# Patient Record
Sex: FEMALE | Race: WHITE | NOT HISPANIC OR LATINO | Employment: UNEMPLOYED | URBAN - METROPOLITAN AREA
[De-identification: names, ages, dates, MRNs, and addresses within clinical notes are randomized per-mention and may not be internally consistent; named-entity substitution may affect disease eponyms.]

---

## 2017-01-05 ENCOUNTER — MYC MEDICAL ADVICE (OUTPATIENT)
Dept: FAMILY MEDICINE | Facility: CLINIC | Age: 57
End: 2017-01-05

## 2017-01-05 DIAGNOSIS — R07.89 CHEST WALL PAIN: ICD-10-CM

## 2017-01-05 DIAGNOSIS — E89.0 S/P THYROIDECTOMY: ICD-10-CM

## 2017-01-05 DIAGNOSIS — Z85.3 PERSONAL HISTORY OF MALIGNANT NEOPLASM OF BREAST: ICD-10-CM

## 2017-01-05 DIAGNOSIS — E89.2 POSTSURGICAL HYPOPARATHYROIDISM (H): ICD-10-CM

## 2017-01-05 DIAGNOSIS — Z98.84 STATUS POST BARIATRIC SURGERY: ICD-10-CM

## 2017-01-05 DIAGNOSIS — Z80.49 FAMILY HISTORY OF UTERINE CANCER: ICD-10-CM

## 2017-01-05 DIAGNOSIS — D89.42 IDIOPATHIC MAST CELL ACTIVATION SYNDROME (H): Primary | Chronic | ICD-10-CM

## 2017-01-06 ENCOUNTER — OFFICE VISIT (OUTPATIENT)
Dept: PALLIATIVE CARE | Facility: CLINIC | Age: 57
End: 2017-01-06
Attending: INTERNAL MEDICINE
Payer: COMMERCIAL

## 2017-01-06 VITALS
HEART RATE: 122 BPM | OXYGEN SATURATION: 100 % | DIASTOLIC BLOOD PRESSURE: 85 MMHG | SYSTOLIC BLOOD PRESSURE: 166 MMHG | RESPIRATION RATE: 18 BRPM | TEMPERATURE: 97 F

## 2017-01-06 DIAGNOSIS — M62.838 MUSCLE SPASM: ICD-10-CM

## 2017-01-06 DIAGNOSIS — R07.89 CHEST WALL PAIN: Primary | ICD-10-CM

## 2017-01-06 DIAGNOSIS — G43.909 MIGRAINE WITHOUT STATUS MIGRAINOSUS, NOT INTRACTABLE, UNSPECIFIED MIGRAINE TYPE: Primary | ICD-10-CM

## 2017-01-06 PROCEDURE — 99214 OFFICE O/P EST MOD 30 MIN: CPT | Mod: ZP | Performed by: INTERNAL MEDICINE

## 2017-01-06 PROCEDURE — 99212 OFFICE O/P EST SF 10 MIN: CPT | Mod: ZF

## 2017-01-06 RX ORDER — MORPHINE SULFATE 15 MG/1
TABLET, FILM COATED, EXTENDED RELEASE ORAL
Qty: 30 TABLET | Refills: 0 | Status: SHIPPED | OUTPATIENT
Start: 2017-01-14 | End: 2017-02-13

## 2017-01-06 RX ORDER — BACLOFEN 10 MG/1
5 TABLET ORAL 3 TIMES DAILY
Qty: 90 TABLET | Refills: 3 | Status: SHIPPED | OUTPATIENT
Start: 2017-01-06 | End: 2017-01-12

## 2017-01-06 RX ORDER — MORPHINE SULFATE 30 MG/1
TABLET, FILM COATED, EXTENDED RELEASE ORAL
Qty: 60 TABLET | Refills: 0 | Status: SHIPPED | OUTPATIENT
Start: 2017-01-14 | End: 2017-02-13

## 2017-01-06 ASSESSMENT — PAIN SCALES - GENERAL: PAINLEVEL: EXTREME PAIN (9)

## 2017-01-06 NOTE — PROGRESS NOTES
"Palliative Staff/Outpatient Clinic    CC:  Patient ID: chronic complex myofascial chest wall pain after breast cancer surgery and reconstruction   Saw Dr England (at my insistence) earlier in 2016 for a 2nd opinion   Mood disturbance in this context   Follows closely also with acupuncture, massage, psychotherapy.   Is diagnosed also with MCAS and follows with Dr Avendaño  Thyroid cancer s/p resection, ablations  Interim Hx:  Ms. Arias says overall her pain continues to be severe.  She may be running out of her colchicine because of insurance changes, but she is still working on that.  She continues to be involved with, as frequent as she can arrange it, acupuncture and myofacial massage for her chest which help, although usually only for a couple of days at the most.  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  She says it feels like there are \"2 meat hooks\" digging into her anterior chest wall.  Dr. England had recommended she talk with aplastic surgeon around this and she has not made that appointment yet and forgot the name of the plastic surgeon so I sent a note to Dr. England to ask who he had in mind because Tisha had thought that there was someone in particular that Dr. England had in mind.       She continues on the 30 mg of morphine in the morning and 45 long acting morphine at night, celecoxib, Robaxin, diazepam for spasms, colchicine, and compounded  gel.   The Robaxin does not do anything as far as she can tell.  She continues to express unhappiness with her pain relief and wondered what other options there are.  I talked about changing to another muscle relaxant and she said the one that actually worked better than anything was baclofen.  Baclofen is on her allergy list and I review the chart with her around that.  Around 2013 she was on baclofen, sertraline and high dose Topamax, she tells " me.  She was seen by Dr. Chao and I review those notes.  She was having some twitching.  She tapered off all those medicines and it got better.  She has typically been very sensitive to serotonergic agents in the past.  It is unclear to me from reading the chart whether the twitching etc was related to the other meds vs to the baclofen was implicated in this - certainly many of the side effects she was describing to me would be more likely from Topamax than baclofen, although it cannot be certain.  She certainly did not have an allergy to baclofen.  She would be willing to try it again.      SOCIAL HISTORY:   She is now working as a  for ???? and is driving more than she thought.  She finds driving very painful because of the use of her anterior chest wall muscles.  Otherwise unchanged.       REVIEW OF SYSTEMS:   Stooling okay.  Comprehensive review of systems is otherwise negative.       PHYSICAL EXAMINATION:   VITAL SIGNS:   Noted.   GENERAL:   Alert, pleasant, healthy-appearing middle-aged woman in no acute distress.    AFFECT:   Full, appropriate, clear sensorium.  Fluent speech.  Normal memory and thought processes.      IMPRESSION/RECOMMENDATIONS:   56-year-old woman with chronic myofacial and neuropathic chest wall pain after mastectomy and reconstruction for breast cancer.  We are going to try baclofen 5 t.i.d., increase to 10 t.i.d.  Talked about side effects and what to watch out for.  Advised her if she ever wanted to stop the baclofen that she cannot do it cold turkey and would need to taper, and she very much understands that.  Continue the other medicines except the Robaxin.  Followup in a couple of months.  She should call me in a couple of weeks with how she is doing on the baclofen.       Edu Benitez MD       Chart data reviewed today:    Family History   Problem Relation Age of Onset     Allergies Mother      Arthritis Mother      CANCER Mother      uterine/uterine      Hypertension Mother      on HCTZ     Hyperlipidemia Mother      CEREBROVASCULAR DISEASE Mother      s/p brain surgery     Breast Cancer Mother      Depression Mother      Anxiety Disorder Mother      Anesthesia Reaction Mother      Asthma Mother      Skin Cancer Mother      HEART DISEASE Father      DIABETES Father      Coronary Artery Disease Father      Hypertension Father      Hyperlipidemia Father      CEREBROVASCULAR DISEASE Father      Multiple strokes     Obesity Father      DIABETES Brother      Depression Brother      Hypertension Brother      CANCER Brother 57     pancreatic cancer     CANCER Maternal Grandfather      pancreatic cancer/stomach cancer     Prostate Cancer Maternal Grandfather      Prostrate and Bladder     Other Cancer Maternal Grandfather      Pancreatic 1988, Bladder 1977     CANCER Maternal Grandmother      uterine     Breast Cancer Maternal Grandmother      Skin Cancer Maternal Grandmother      DIABETES Brother      Breast Cancer Cousin      Grand mothers sister     Other Cancer Brother      Stage 3 Pancreatic 2-2015     Depression Brother      Asthma Brother      Obesity Brother      Anesthesia Reaction Other      H/a, itching, drug interactions     Asthma Other      Thyroid Disease No family hx of      CANCER Brother      Pancreatic      Past Medical History   Diagnosis Date     Seasonal allergic rhinitis 4/12/11 skin tests pos. for: dog/M/T/G/W--NEGATIVE FOOD TESTS FOR: shrimp, crab, lobster, coconut     Seasonal allergic conjunctivitis      Allergic rhinitis due to animal dander      Rhinitis, allergic to other allergen      Food intolerance NOT food allergic--oral allergy syndrome with pollens and raw/fresh fruit/vegetables. No real need for Epipen for this alone.     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      section       Mastectomy, reconstruct breast, combined  2012     Procedure:COMBINED MASTECTOMY, RECONSTRUCT BREAST; Bilateral Mastectomies, Right Axillary [place] Node Biopsy, Bilateral Breast Reconstruction with Tissue Expanders, Reconstruction of inframammary fold, bilateral pain management systems; Surgeon:ANUPAM CAMPBELL; [facility]     Irrigation and debridement breast  3/1/2012     Procedure:IRRIGATION AND DEBRIDEMENT BREAST; Irrigation and Debridement, Wound Closure Right Breast; Surgeon:JUNG GUILLEN; Location[facility]     Reconstruct breast  2012     Procedure: RECONSTRUCT BREAST;  Bilateral 2nd stage breast reconstruction, revision,       Hysterectomy, pap no longer indicated       DeVinci assister lap hyst with BSO     Davinci hysterectomy total, bilateral salpingo-oophorectomy, combined  2012     Procedure: COMBINED DAVINCI HYSTERECTOMY TOTAL, SALPINGO-OOPHORECTOMY;  Davinci Total Laparoscopic Hysterectomy, Bilateral Salpingo Oophorectomy, Pelvic Washings, Cystoscopy;  Surgeon: Evie Sheikh MD;  Location: [facility] OR     Thyroidectomy  7/10/2013     Procedure: THYROIDECTOMY;   Total Thyroidectomy with central neck dissection;  Surgeon: Indiana Sneed MD;  Location: [facility] OR     Cosmetic surgery  8-     Final Stage of Mastectomy     Gi surgery  11-     Rachael-en Y w cholecystectomy     Soft tissue surgery  1-30-12     Mastectomy w severe myofascial pain syndrome     Back surgery  2000,     Bulging disc w nerve root impigment     Allergies   Allergen Reactions     No Clinical Screening - See Comments Shortness Of Breath, Palpitations, Anaphylaxis, Itching, Swelling, Difficulty breathing and Rash     Sukhjinder wipes- oral allergy -  July 2015: throat tightness from a Chinese herbal medicine Wilmer Tran     Amitriptyline Hcl Swelling     Baclofen      Birch Trees      Potatoes, carrots, cherries, celery, apple, pears, plums, peaches, parsnip, kiwi, hazelnuts, and apricots,      Cymbalta Other (See Comments)     Flushing, tremor/muscle twitching and edema     Gabapentin      edema     Grass      Mugwort [Artemisia Vulgaris]      Various spices     Nortriptyline Itching, Visual Disturbance, Swelling and GI Disturbance     Ragweeds      Melons, bananas, cucumbers, zucchini.     Topamax      Serotonin Anxiety and Swelling            Medications:   I have reviewed this patient's medication profile.    MNPMP: reviewed      Data reviewed:  I reviewed recent labs and imaging, comments on pertinent findings:  Labs ok/thyroid    Thank you for involving us in the patient's care. 25' face time over half counseling.   Edu Benitez MD / Palliative Medicine / Pager [phone] / [facility] Inpatient Team Consult Pager [phone] (answered 8am-430pm M-F) - ok to text page via [employer] / After-Hours Answering Service [phone] / Palliative Clinic in the [facility] at the [facility] [place] [facility] - [phone] (scheduling); [phone] (triage).

## 2017-01-06 NOTE — TELEPHONE ENCOUNTER
RN: please call patient. I need a specific diagnosis for each referral I am giving. I need to warn her that just because I am giving a referral, doesn't mean her new insurance will automatically cover her going to those doctors. Can you jimmie up each referral for me and associate a diagnosis to each one? Thank you.   NAS HARMAN M.D.

## 2017-01-06 NOTE — NURSING NOTE
"Tisha Arias is a 56 year old female who presents for:  Chief Complaint   Patient presents with     Oncology Clinic Visit     Return        Initial Vitals:  /85 mmHg  Pulse 122  Temp(Src) 97  F (36.1  C) (Oral)  Resp 18  Wt   SpO2 100% Estimated body mass index is 33.55 kg/(m^2) as calculated from the following:    Height as of 12/20/16: 1.651 m (5' 5\").    Weight as of 7/12/16: 91.445 kg (201 lb 9.6 oz).. There is no height or weight on file to calculate BSA. BP completed using cuff size: regular  Extreme Pain (9) No LMP recorded. Patient is postmenopausal. Allergies and medications reviewed.     Medications: MEDICATION REFILLS NEEDED TODAY.  Pharmacy name entered into [facility]:    [facility] [address] [place] [address] [address] [facility] [address] [place] [address] PHARMACY SERVICES [place], [place] - [facility] [address], [address]    Comments: Diazepam and Morphine and Compound cream.  Pt declined to review meds. She stated she has stopped taking her cromolyn solution, because she said it gave her migraines.  Pt also informed us that she still has colcrys on hand, but her insurance is currently not paying for a refill.    7 minutes for nursing intake (face to face time)   Naima Saavedra LPN          "

## 2017-01-06 NOTE — Clinical Note
"1/6/2017       RE: Tisha Arias  [address] [place] [address]     Dear Colleague,    Thank you for referring your patient, Tisha Arias, to the [facility] at [facility]. Please see a copy of my visit note below.    Palliative Staff/Outpatient Clinic    CC:  Patient ID: chronic complex myofascial chest wall pain after breast cancer surgery and reconstruction   Saw Dr England (at my insistence) earlier in 2016 for a 2nd opinion   Mood disturbance in this context   Follows closely also with acupuncture, massage, psychotherapy.   Is diagnosed also with MCAS and follows with Dr Avendaño  Thyroid cancer s/p resection, ablations  Interim Hx:  Ms. Arias says overall her pain continues to be severe.  She may be running out of her colchicine because of insurance changes, but she is still working on that.  She continues to be involved with, as frequent as she can arrange it, acupuncture and myofacial massage for her chest which help, although usually only for a couple of days at the most.  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  She says it feels like there are \"2 meat hooks\" digging into her anterior chest wall.  Dr. England had recommended she talk with aplastic surgeon around this and she has not made that appointment yet and forgot the name of the plastic surgeon so I sent a note to Dr. England to ask who he had in mind because Tisha had thought that there was someone in particular that Dr. England had in mind.       She continues on the 30 mg of morphine in the morning and 45 long acting morphine at night, celecoxib, Robaxin, diazepam for spasms, colchicine, and compounded  gel.   The Robaxin does not do anything as far as she can tell.  She continues to express unhappiness with her pain relief and wondered what other options " there are.  I talked about changing to another muscle relaxant and she said the one that actually worked better than anything was baclofen.  Baclofen is on her allergy list and I review the chart with her around that.  Around 2013 she was on baclofen, sertraline and high dose Topamax, she tells me.  She was seen by Dr. Chao and I review those notes.  She was having some twitching.  She tapered off all those medicines and it got better.  She has typically been very sensitive to serotonergic agents in the past.  It is unclear to me from reading the chart whether the twitching etc was related to the other meds vs to the baclofen was implicated in this - certainly many of the side effects she was describing to me would be more likely from Topamax than baclofen, although it cannot be certain.  She certainly did not have an allergy to baclofen.  She would be willing to try it again.      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  She finds driving very painful because of the use of her anterior chest wall muscles.  Otherwise unchanged.       REVIEW OF SYSTEMS:   Stooling okay.  Comprehensive review of systems is otherwise negative.       PHYSICAL EXAMINATION:   VITAL SIGNS:   Noted.   GENERAL:   Alert, pleasant, healthy-appearing middle-aged woman in no acute distress.    AFFECT:   Full, appropriate, clear sensorium.  Fluent speech.  Normal memory and thought processes.      IMPRESSION/RECOMMENDATIONS:   56-year-old woman with chronic myofacial and neuropathic chest wall pain after mastectomy and reconstruction for breast cancer.  We are going to try baclofen 5 t.i.d., increase to 10 t.i.d.  Talked about side effects and what to watch out for.  Advised her if she ever wanted to stop the baclofen that she cannot do it cold turkey and would need to taper, and she very much understands that.  Continue the other medicines except the Robaxin.  Followup in  a couple of months.  She should call me in a couple of weeks with how she is doing on the baclofen.       Edu Benitez MD       Chart data reviewed today:    Family History   Problem Relation Age of Onset     Allergies Mother      Arthritis Mother      CANCER Mother      uterine/uterine     Hypertension Mother      on HCTZ     Hyperlipidemia Mother      CEREBROVASCULAR DISEASE Mother      s/p brain surgery     Breast Cancer Mother      Depression Mother      Anxiety Disorder Mother      Anesthesia Reaction Mother      Asthma Mother      Skin Cancer Mother      HEART DISEASE Father      DIABETES Father      Coronary Artery Disease Father      Hypertension Father      Hyperlipidemia Father      CEREBROVASCULAR DISEASE Father      Multiple strokes     Obesity Father      DIABETES Brother      Depression Brother      Hypertension Brother      CANCER Brother 57     pancreatic cancer     CANCER Maternal Grandfather      pancreatic cancer/stomach cancer     Prostate Cancer Maternal Grandfather      Prostrate and Bladder     Other Cancer Maternal Grandfather      Pancreatic 1988, Bladder 1977     CANCER Maternal Grandmother      uterine     Breast Cancer Maternal Grandmother      Skin Cancer Maternal Grandmother      DIABETES Brother      Breast Cancer Cousin      Grand mothers sister     Other Cancer Brother      Stage 3 Pancreatic 2-2015     Depression Brother      Asthma Brother      Obesity Brother      Anesthesia Reaction Other      H/a, itching, drug interactions     Asthma Other      Thyroid Disease No family hx of      CANCER Brother      Pancreatic      Past Medical History   Diagnosis Date     Seasonal allergic rhinitis 4/12/11 skin tests pos. for: dog/M/T/G/W--NEGATIVE FOOD TESTS FOR: shrimp, crab, lobster, coconut     Seasonal allergic conjunctivitis      Allergic rhinitis due to animal dander      Rhinitis, allergic to other allergen      Food intolerance NOT food allergic--oral allergy syndrome with  pollens and raw/fresh fruit/vegetables. No real need for Epipen for this alone.     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      section       Mastectomy, reconstruct breast, combined  2012     Procedure:COMBINED MASTECTOMY, RECONSTRUCT BREAST; Bilateral Mastectomies, Right Axillary [place] Node Biopsy, Bilateral Breast Reconstruction with Tissue Expanders, Reconstruction of inframammary fold, bilateral pain management systems; Surgeon:ANUPAM CAMPBELL; Location[facility]     Irrigation and debridement breast  3/1/2012     Procedure:IRRIGATION AND DEBRIDEMENT BREAST; Irrigation and Debridement, Wound Closure Right Breast; Surgeon:JUNG GUILLEN; Location:[facility]     Reconstruct breast  2012     Procedure: RECONSTRUCT BREAST;  Bilateral 2nd stage breast reconstruction, revision,       Hysterectomy, pap no longer indicated       DeVinci assister lap hyst with BSO     Davinci hysterectomy total, bilateral salpingo-oophorectomy, combined   12/12/2012     Procedure: COMBINED DAVINCI HYSTERECTOMY TOTAL, SALPINGO-OOPHORECTOMY;  Davinci Total Laparoscopic Hysterectomy, Bilateral Salpingo Oophorectomy, Pelvic Washings, Cystoscopy;  Surgeon: Evie Sheikh MD;  Location: [facility]     Thyroidectomy  7/10/2013     Procedure: THYROIDECTOMY;  Total Thyroidectomy with central neck dissection;  Surgeon: Indiana Sneed MD;  Location: [facility] OR     Cosmetic surgery  8-     Final Stage of Mastectomy     Gi surgery  11-     Rachael-en Y w cholecystectomy     Soft tissue surgery  1-30-12     Mastectomy w severe myofascial pain syndrome     Back surgery  2000,     Bulging disc w nerve root impigment     Allergies   Allergen Reactions     No Clinical Screening - See Comments Shortness Of Breath, Palpitations, Anaphylaxis, Itching, Swelling, Difficulty breathing and Rash     Sukhjinder wipes- oral allergy -  July 2015: throat tightness from a Chinese herbal medicine Wilmer Tran     Amitriptyline Hcl Swelling     Baclofen      Birch Trees      Potatoes, carrots, cherries, celery, apple, pears, plums, peaches, parsnip, kiwi, hazelnuts, and apricots,      Cymbalta Other (See Comments)     Flushing, tremor/muscle twitching and edema     Gabapentin      edema     Grass      Mugwort [Artemisia Vulgaris]      Various spices     Nortriptyline Itching, Visual Disturbance, Swelling and GI Disturbance     Ragweeds      Melons, bananas, cucumbers, zucchini.     Topamax      Serotonin Anxiety and Swelling            Medications:   I have reviewed this patient's medication profile.    MNPMP: reviewed      Data reviewed:  I reviewed recent labs and imaging, comments on pertinent findings:  Labs ok/thyroid    Thank you for involving us in the patient's care. 25' face time over half counseling.   Edu Benitez MD / Palliative Medicine / Pager [phone] / [facility] Inpatient Team Consult Pager [phone] (answered 8am-430pm M-F) - ok to text page via [employer] /  After-Hours Answering Service [phone] / Palliative Clinic in the [facility] at the [facility] - [phone] (scheduling); [phone] (triage).

## 2017-01-06 NOTE — TELEPHONE ENCOUNTER
Imitrex      Last Written Prescription Date: 08/21/2015  Last Fill Quantity: 20, # refills: 4  Last Office Visit with [facility], [facility] or [facility] prescribing provider: 01/06/2017       BP Readings from Last 3 Encounters:   01/06/17 166/85   12/20/16 149/85   11/02/16 130/75

## 2017-01-06 NOTE — MR AVS SNAPSHOT
After Visit Summary   1/6/2017    Tisha Arias    MRN: [id number]           Patient Information     Date Of Birth          1960        Visit Information        Provider Department      1/6/2017 8:00 AM Edu Benitez MD [facility]        Today's Diagnoses     Muscle spasm    -  1     Chest wall pain         Chest-wall pain            Follow-ups after your visit        Your next 10 appointments already scheduled     Jan 13, 2017  3:30 PM   (Arrive by 3:15 PM)   Return Visit with Shahla Crawley MD   [facility]    [address] [place] [id number]   [phone]            Mar 27, 2017  9:30 AM   (Arrive by 9:15 AM)   Return Visit with Jian Avendaño MD   [facility]    [address] [place] [address]   [phone]            Apr 07, 2017  8:00 AM   (Arrive by 7:45 AM)   Return Visit with Edu Benitez MD   [facility]    [address] [place] [id number] [phone]              Who to contact     If you have questions or need follow up information about today's clinic visit or your schedule please contact [facility] directly at [phone].  Normal or non-critical lab and imaging results will be communicated to you by MyChart, letter or phone within 4 business days after the clinic has received the results. If you do not hear from us within 7 days, please contact the clinic through MyChart or phone. If you have a critical or abnormal lab result, we will notify you by phone as soon as possible.  Submit refill requests through imedo or call your pharmacy and they will forward the refill request to us. Please allow 3 business days for your refill to be completed.           Additional Information About Your Visit        [employer] Information     [employer] gives you secure access to your electronic health record. If you see a primary care provider, you can also send messages to your care team and make appointments. If you have questions, please call your primary care clinic.  If you do not have a primary care provider, please call [phone] and they will assist you.        Care EveryWhere ID     This is your Care EveryWhere ID. This could be used by other organizations to access your [facility] medical records  [id number]        Your Vitals Were     Pulse Temperature Respirations Pulse Oximetry          122 97  F (36.1  C) (Oral) 18 100%         Blood Pressure from Last 3 Encounters:   01/06/17 166/85   12/20/16 149/85   11/02/16 130/75    Weight from Last 3 Encounters:   07/12/16 91.445 kg (201 lb 9.6 oz)   06/17/16 90.266 kg (199 lb)   06/17/16 90.266 kg (199 lb)              Today, you had the following     No orders found for display         Today's Medication Changes          These changes are accurate as of: 1/6/17  8:52 AM.  If you have any questions, ask your nurse or doctor.               Start taking these medicines.        Dose/Directions    baclofen 10 MG tablet   Commonly known as:  LIORESAL   Used for:  Muscle spasm, Chest wall pain   Started by:  Edu Benitez MD        Dose:  5 mg   Take 0.5 tablets (5 mg) by mouth 3 times daily . After 4 days ok to increase to 10mg 3 times a day   Quantity:  90 tablet   Refills:  3         These medicines have changed or have updated prescriptions.        Dose/Directions    * morphine 15 MG 12 hr tablet   Commonly known as:  MS CONTIN   This may have changed:  Another medication with the same name was removed. Continue taking this medication, and follow the directions you see here.   Used for:  Chest wall pain   Changed by:  Edu Benitez MD        Start taking on:  1/14/2017   1 tab (15mg) at bedtime.   Quantity:  30  tablet   Refills:  0       * morphine 30 MG 12 hr tablet   Commonly known as:  MS CONTIN   This may have changed:  Another medication with the same name was removed. Continue taking this medication, and follow the directions you see here.   Used for:  Chest wall pain   Changed by:  Edu Benitez MD        Start taking on:  1/14/2017   1 tab (30mg) in the morning and in the afternoon + 15mg at bedtime   Quantity:  60 tablet   Refills:  0       * Notice:  This list has 2 medication(s) that are the same as other medications prescribed for you. Read the directions carefully, and ask your doctor or other care provider to review them with you.         Where to get your medicines      These medications were sent to [facility] [address] [place] [address] [phone]  [address] [place] [address] [phone] [address]    Hours:  TRANSPLANT PHONE NUMBER [phone] Phone:  [phone]    - COMPOUND - PHARMACY TO MIX COMPOUNDED MEDICATION      These medications were sent to [facility] [address] [place] [address] [address] [facility] [address]    Hours:  Test fax successful 12/11/02   Phone:  [phone]    - baclofen 10 MG tablet      Some of these will need a paper prescription and others can be bought over the counter.  Ask your nurse if you have questions.     Bring a paper prescription for each of these medications    - morphine 15 MG 12 hr tablet  - morphine 30 MG 12 hr tablet             Primary Care Provider Office Phone # Fax #    Chandni Orona [phone]       [address] [address]        Thank you!     Thank you for choosing [facility]  for your care. Our goal is always to provide you with excellent care. Hearing back from our patients is one way we can continue to improve our services. Please take a few minutes  to complete the written survey that you may receive in the mail after your visit with us. Thank you!             Your Updated Medication List - Protect others around you: Learn how to safely use, store and throw away your medicines at www.disposemymeds.org.          This list is accurate as of: 1/6/17  8:52 AM.  Always use your most recent med list.                   Brand Name Dispense Instructions for use    ACAI PO      Take 1,000 mg by mouth 2 times daily       albuterol 108 (90 BASE) MCG/ACT Inhaler    VENTOLIN HFA    18 g    Inhale 2 puffs into the lungs every 4 hours as needed for shortness of breath / dyspnea or wheezing       aluminum chloride 20 % external solution    DRYSOL    60 mL    Apply topically At Bedtime       AZMACORT IN      Inhale 2 puffs into the lungs as needed       baclofen 10 MG tablet    LIORESAL    90 tablet    Take 0.5 tablets (5 mg) by mouth 3 times daily . After 4 days ok to increase to 10mg 3 times a day       BENADRYL PO      Take 50 mg by mouth as needed       BETAINE HCL PO      Take 2 tablets by mouth as needed Betaine HCL and Pepsin: Take 1-7 tabs by mouth daily, if burning take one tablet less once daily. Betaine HCL 1.04 g Pepsin 40 mg       calcitRIOL 0.25 MCG capsule    ROCALTROL    270 capsule    2 tabs in am and 1 tab qhs       CALCIUM CITRATE +D 315-250 MG-UNIT Tabs per tablet   Generic drug:  calcium citrate-vitamin D     120 tablet    Take 2 tablets by mouth 3 times daily       celecoxib 200 MG capsule    celeBREX    180 capsule    Take 1 capsule (200 mg) by mouth 2 times daily       COLCRYS 0.6 MG tablet   Generic drug:  colchicine     270 tablet    One tab up to three daily for costochondritis (chest) discomfort. Scale back use to prn with loose stools or bloating.       COMPOUND - PHARMACY TO MIX COMPOUNDED MEDICATION    CMPD RX    120 g    Apply small amount to affected area two times daily.Ketamine 6% + ketoprofen 10% + lidocaine 10% in Lipo.       cromolyn 100  MG/5ML (HIGH CONC) solution    GASTROCROM    1440 mL    Take 10 mLs (200 mg) by mouth 4 times daily       cromolyn 4 % ophthalmic solution    OPTICROM    10 mL    INSTILL 1 DROP IN BOTH EYES FOUR TIMES DAILY       cromolyn sodium 5.2 MG/ACT Aers Inhaler    [facility] CROMOLYN SODIUM    1 Bottle    Spray 1 spray (1 mL) in nostril daily       cyclobenzaprine 10 MG tablet    FLEXERIL     Take 10 mg by mouth 3 times daily as needed On hold Dr Monsalve       diazepam 5 MG tablet    VALIUM    90 tablet    Take 1 tablet (5 mg) by mouth 3 times daily as needed For muscle spasms       diclofenac 1 % Gel topical gel    VOLTAREN    100 g    Apply affected area two times daily PRN using enclosed dosing card.       docusate sodium 100 MG tablet    COLACE    60 tablet    Take 100 mg by mouth daily       EPINEPHrine 0.3 MG/0.3ML injection    EPIPEN 2-CARIDAD    2 each    Inject 0.3 mLs (0.3 mg) into the muscle once as needed for anaphylaxis       hydrochlorothiazide 12.5 MG capsule    MICROZIDE    90 capsule    Take 1 capsule (12.5 mg) by mouth daily       levothyroxine 112 MCG tablet    SYNTHROID/LEVOTHROID    189 tablet    Monday-Saturday: (2 tablet/day);  Sunday: (2.5 tablet)       lidocaine 5 % ointment    XYLOCAINE     SANJANA TOPICALLY QID PRN       MAGNESIUM CITRATE PO      Take 400 mg by mouth daily       methocarbamol 750 MG tablet    ROBAXIN    120 tablet    Take 1 tablet (750 mg) by mouth 4 times daily as needed       mometasone 110 MCG/INH inhaler    ASMANEX 30 METERED DOSES    3 Inhaler    Inhale 1 puff into the lungs daily       montelukast 10 MG tablet    SINGULAIR    120 tablet    TAKE 2 TABLETS BY MOUTH TWICE DAILY       * morphine 15 MG 12 hr tablet   Start taking on:  1/14/2017    MS CONTIN    30 tablet    1 tab (15mg) at bedtime.       * morphine 30 MG 12 hr tablet   Start taking on:  1/14/2017    MS CONTIN    60 tablet    1 tab (30mg) in the morning and in the afternoon + 15mg at bedtime       OMEGA 3 PO      Take 5 mLs by  mouth       ondansetron 4 MG ODT tab    ZOFRAN ODT    60 tablet    Take 1-2 tablets (4-8 mg) by mouth every 8 hours as needed for nausea or vomiting       order for [facility]     2 Units    Equipment being ordered: compression stockings. 20-30 mm or 30 - 40 mm as patient can tolerate. Physical therapy to determine if they should be above or below the knee.       polyethylene glycol powder    MIRALAX    510 g    Take 17 g by mouth daily       potassium chloride SA 20 MEQ CR tablet    potassium chloride    90 tablet    Take 1 tablet (20 mEq) by mouth daily       PROBIOTIC DAILY PO      Take 1 capsule by mouth daily Lacto acid bifidobacterium       ranitidine 150 MG tablet    ZANTAC    45 tablet    TAKE 1/2 TABLET BY MOUTH THREE TIMES DAILY       * SUMAtriptan 50 MG tablet    IMITREX    20 tablet    TAKE 1 TABLET BY MOUTH AT ONSET OF HEADACHE. MAY REPEAT IN 2 HOURS IF NEEDED. MAX OF 2 TABLETS DAILY       * SUMAtriptan 50 MG tablet    IMITREX    5 tablet    TAKE ONE TABLET BY MOUTH AT ONSET OF HEADACHE. MAY REPEAT IN TWO HOURS AS NEEDED. MAX OF TWO TABLETS BY MOUTH DAILY       tacrolimus 0.1 % ointment    PROTOPIC    60 g    Apply topically 2 times daily       tamoxifen 20 MG tablet    NOLVADEX    90 tablet    Take 1 tablet (20 mg) by mouth daily       triamcinolone 0.025 % ointment    KENALOG    80 g    Apply topically 2 times daily Mix with 1 lb jar of vaseline or aquaphor       TUMS 500 MG chewable tablet   Generic drug:  calcium carbonate     180 chew tab    Take 2 tablets (1,000 mg) by mouth nightly as needed for other (cramps)       * UNABLE TO FIND      1 tablet 3 times daily MEDICATION NAME: calcium D-Glucarate       * UNABLE TO FIND      2 tablets 3 times daily MEDICATION NAME: Natural D-Hist       * UNABLE TO FIND      MEDICATION NAME: Tumeric 3 capsules daily       * UNABLE TO FIND      1 tablet 3 times daily MEDICATION NAME: Digestzymes       * UNABLE TO FIND      1 tablet daily MEDICATION NAME: Pure  Encapsulations       vitamin D3 2000 UNITS Caps     60 capsule    Take 6,000 Units by mouth daily       ZYRTEC ALLERGY 10 MG tablet   Generic drug:  cetirizine     90 tablet    Take 1 tablet (10 mg) by mouth 3 times daily On hold for lab test.       * Notice:  This list has 9 medication(s) that are the same as other medications prescribed for you. Read the directions carefully, and ask your doctor or other care provider to review them with you.

## 2017-01-06 NOTE — TELEPHONE ENCOUNTER
Voice mail left for patient to call RN hotline at [phone].   See provider message, Is patient going to continue to see all the providers listed below?

## 2017-01-08 NOTE — TELEPHONE ENCOUNTER
Routing refill request to provider for review/approval because:  Patient needs to be seen because it has been more than 1 year since last office visit.

## 2017-01-09 RX ORDER — SUMATRIPTAN 50 MG/1
TABLET, FILM COATED ORAL
Qty: 5 TABLET | Refills: 0 | Status: SHIPPED | OUTPATIENT
Start: 2017-01-09 | End: 2017-01-19

## 2017-01-10 ENCOUNTER — TELEPHONE (OUTPATIENT)
Dept: PALLIATIVE CARE | Facility: CLINIC | Age: 57
End: 2017-01-10

## 2017-01-10 DIAGNOSIS — R07.89 CHEST WALL PAIN: ICD-10-CM

## 2017-01-10 DIAGNOSIS — M62.838 MUSCLE SPASM: Primary | ICD-10-CM

## 2017-01-10 NOTE — TELEPHONE ENCOUNTER
"Received 2 VMs from patient with updates for MD.     -She reports that when she left her appointment last week she had a migraine and had to use 2 out of her 5 Imitrex tabs. She states that she is going through the Imitrex quicker then she thought she would.   -She also reports that it has been week since she ran out of her colcyrs (due to insurance) and she is having severe chest wall pain. She states the pain is in her bones/ribs and it is painful to breath.   -Patient asks if MD would increase her baclofen to 15mg TID instead of 10mg. She states that she is still experiencing \"a lot of tightness\" and thinks an increase in baclofen may help.  -She requests a letter be written for [place] at her new job for frequent MD appointments    Will route updates to MD for review.       "

## 2017-01-10 NOTE — Clinical Note
January 11, 2017       RE: Tisha Arias  [address]  [place] [address]         To whom it may concern    Ms Arias is a patient long under my care. She has several painful, chronic medical conditions which require frequent medical attention, including physician visits, and therapy visits. These need to continue, are necessary for her health, occur more often than weekly, and will continue long-term      Sincerely,      Edu Benitez MD

## 2017-01-11 NOTE — TELEPHONE ENCOUNTER
My Chart message sent to patient.     Harini Ziegler RN   [facility]- Float Nurse/Katie

## 2017-01-12 RX ORDER — BACLOFEN 10 MG/1
15 TABLET ORAL 3 TIMES DAILY
Qty: 90 TABLET | Refills: 3 | COMMUNITY
Start: 2017-01-13 | End: 2017-04-26

## 2017-01-12 NOTE — TELEPHONE ENCOUNTER
Called [facility] to request they run the colcrys script through the new [employer] insurance and send me the denial so I can request new PA (verified in records original denial was from [facility] in 2016 so on the old insurance plan). Jessica unable to locate patient's new insurance information - she will call patient for that insurance information and run script through that and send me new denial information. Advised Jessica of fax # and phone #. Will wait for denial information.

## 2017-01-12 NOTE — TELEPHONE ENCOUNTER
Called pharmacy for update as no new PA information was received yet - was informed they had not reached patient to get the new insurance information. They tell me they will try to reach the patient again. Will wait to hear from pharmacy.

## 2017-01-12 NOTE — TELEPHONE ENCOUNTER
Discussed with MD and called patient back with information.     - Advised patient to let us/her other MD know when she needs a refill of imitrex  - Patient reports that she thinks the colcrys script was run through her old insure and she has new insurace as of 1/2017, she asks if we can confirm which insurance this was run through and if not run through new insurance ([employer]), please try PA/appeal through them. If unable to go through [employer] patient will try to get coverage through a different insurance company. Advised I would look into this and update her.   -MD okay with baclofen increase to 15mg TID on Friday, patient should remain on 10mg TID for one week prior to dose increase, she verbalized understanding with this. Changed script in epic to reflect new orders.   -FMLA letter written and dropped in lock box on first floor [employer] for patient  tomorrow when she is in the [employer] for another appt. Patient informed.

## 2017-01-13 ENCOUNTER — ONCOLOGY VISIT (OUTPATIENT)
Dept: ONCOLOGY | Facility: CLINIC | Age: 57
End: 2017-01-13
Attending: INTERNAL MEDICINE
Payer: COMMERCIAL

## 2017-01-13 ENCOUNTER — CARE COORDINATION (OUTPATIENT)
Dept: ONCOLOGY | Facility: CLINIC | Age: 57
End: 2017-01-13

## 2017-01-13 VITALS
DIASTOLIC BLOOD PRESSURE: 85 MMHG | TEMPERATURE: 98.1 F | HEART RATE: 93 BPM | RESPIRATION RATE: 12 BRPM | SYSTOLIC BLOOD PRESSURE: 149 MMHG | OXYGEN SATURATION: 98 %

## 2017-01-13 DIAGNOSIS — C50.919 MALIGNANT NEOPLASM OF FEMALE BREAST, UNSPECIFIED LATERALITY, UNSPECIFIED SITE OF BREAST: ICD-10-CM

## 2017-01-13 DIAGNOSIS — C77.0 METASTASIS TO CERVICAL LYMPH NODE (H): Primary | ICD-10-CM

## 2017-01-13 DIAGNOSIS — C73 PAPILLARY CARCINOMA, FOLLICULAR VARIANT (H): ICD-10-CM

## 2017-01-13 PROCEDURE — 99214 OFFICE O/P EST MOD 30 MIN: CPT | Mod: ZP | Performed by: INTERNAL MEDICINE

## 2017-01-13 PROCEDURE — 99212 OFFICE O/P EST SF 10 MIN: CPT | Mod: ZF

## 2017-01-13 RX ORDER — LIDOCAINE HCL 100 %
POWDER (GRAM) MISCELLANEOUS
Status: ON HOLD | COMMUNITY
Start: 2017-01-06 | End: 2017-10-27

## 2017-01-13 ASSESSMENT — PAIN SCALES - GENERAL: PAINLEVEL: EXTREME PAIN (8)

## 2017-01-13 NOTE — MR AVS SNAPSHOT
After Visit Summary   1/13/2017    Tisha Arias    MRN: [id number]           Patient Information     Date Of Birth          1960        Visit Information        Provider Department      1/13/2017 3:30 PM Shahla Crawley MD [facility]         Follow-ups after your visit        Your next 10 appointments already scheduled     Mar 28, 2017  4:30 PM   (Arrive by 4:15 PM)   Return Visit with Jian Avendaño MD   [facility] ([facility])    [address] [place] [id number] [phone]            Apr 07, 2017  8:00 AM   (Arrive by 7:45 AM)   Return Visit with Edu Benitez MD   [facility] ([facility])    [address] [place] [id number] [phone]            Jul 14, 2017  3:30 PM   (Arrive by 3:15 PM)   Return Visit with Shahla Crawley MD   [facility])    [address] [place] [id number] [phone]              Who to contact     If you have questions or need follow up information about today's clinic visit or your schedule please contact [facility] directly at [phone].  Normal or non-critical lab and imaging results will be communicated to you by MyChart, letter or phone within 4 business days after the clinic has received the results. If you do not hear from us within 7 days, please contact the clinic through MyChart or phone. If you have a critical or abnormal lab result, we will notify you by phone as soon as possible.  Submit refill requests through [employer] or call your pharmacy and they will forward the refill request to us. Please allow 3 business days for your refill to be completed.          Additional Information About Your Visit        [employer] Information     [employer] gives you secure access to  your electronic health record. If you see a primary care provider, you can also send messages to your care team and make appointments. If you have questions, please call your primary care clinic.  If you do not have a primary care provider, please call [phone] and they will assist you.        Care EveryWhere ID     This is your Care EveryWhere ID. This could be used by other organizations to access your [place] medical records  [id number]        Your Vitals Were     Pulse Temperature Respirations Pulse Oximetry          93 98.1  F (36.7  C) (Oral) 12 98%         Blood Pressure from Last 3 Encounters:   01/13/17 149/85   01/06/17 166/85   12/20/16 149/85    Weight from Last 3 Encounters:   07/12/16 91.445 kg (201 lb 9.6 oz)   06/17/16 90.266 kg (199 lb)   06/17/16 90.266 kg (199 lb)              Today, you had the following     No orders found for display       Primary Care Provider Office Phone # Fax #    Chandni Orona [phone]       [address] [address]        Thank you!     Thank you for choosing [facility]  for your care. Our goal is always to provide you with excellent care. Hearing back from our patients is one way we can continue to improve our services. Please take a few minutes to complete the written survey that you may receive in the mail after your visit with us. Thank you!             Your Updated Medication List - Protect others around you: Learn how to safely use, store and throw away your medicines at www.disposemymeds.org.          This list is accurate as of: 1/13/17  4:30 PM.  Always use your most recent med list.                   Brand Name Dispense Instructions for use    ACAI PO      Take 1,000 mg by mouth 2 times daily       albuterol 108 (90 BASE) MCG/ACT Inhaler    VENTOLIN HFA    18 g    Inhale 2 puffs into the lungs every 4 hours as needed for shortness of breath / dyspnea or wheezing        aluminum chloride 20 % external solution    DRYSOL    60 mL    Apply topically At Bedtime       AZMACORT IN      Inhale 2 puffs into the lungs as needed       baclofen 10 MG tablet    LIORESAL    90 tablet    Take 1.5 tablets (15 mg) by mouth 3 times daily       BENADRYL PO      Take 50 mg by mouth as needed       BETAINE HCL PO      Take 2 tablets by mouth as needed Betaine HCL and Pepsin: Take 1-7 tabs by mouth daily, if burning take one tablet less once daily. Betaine HCL 1.04 g Pepsin 40 mg       calcitRIOL 0.25 MCG capsule    ROCALTROL    270 capsule    2 tabs in am and 1 tab qhs       CALCIUM CITRATE +D 315-250 MG-UNIT Tabs per tablet   Generic drug:  calcium citrate-vitamin D     120 tablet    Take 2 tablets by mouth 3 times daily       celecoxib 200 MG capsule    celeBREX    180 capsule    Take 1 capsule (200 mg) by mouth 2 times daily       COLCRYS 0.6 MG tablet   Generic drug:  colchicine     270 tablet    One tab up to three daily for costochondritis (chest) discomfort. Scale back use to prn with loose stools or bloating.       COMPOUND - PHARMACY TO MIX COMPOUNDED MEDICATION    CMPD RX    120 g    Apply small amount to affected area two times daily.Ketamine 6% + ketoprofen 10% + lidocaine 10% in Lipo.       cromolyn 100 MG/5ML (HIGH CONC) solution    GASTROCROM    1440 mL    Take 10 mLs (200 mg) by mouth 4 times daily       cromolyn 4 % ophthalmic solution    OPTICROM    10 mL    INSTILL 1 DROP IN BOTH EYES FOUR TIMES DAILY       cromolyn sodium 5.2 MG/ACT Aers Inhaler    [facility] CROMOLYN SODIUM    1 Bottle    Spray 1 spray (1 mL) in nostril daily       cyclobenzaprine 10 MG tablet    FLEXERIL     Take 10 mg by mouth 3 times daily as needed On hold Dr Monsalve       diazepam 5 MG tablet    VALIUM    90 tablet    Take 1 tablet (5 mg) by mouth 3 times daily as needed For muscle spasms       diclofenac 1 % Gel topical gel    VOLTAREN    100 g    Apply affected area two times daily PRN using enclosed dosing  card.       docusate sodium 100 MG tablet    COLACE    60 tablet    Take 100 mg by mouth daily       EPINEPHrine 0.3 MG/0.3ML injection    EPIPEN 2-CARIDAD    2 each    Inject 0.3 mLs (0.3 mg) into the muscle once as needed for anaphylaxis       hydrochlorothiazide 12.5 MG capsule    MICROZIDE    90 capsule    Take 1 capsule (12.5 mg) by mouth daily       levothyroxine 112 MCG tablet    SYNTHROID/LEVOTHROID    189 tablet    Monday-Saturday: (2 tablet/day);  Sunday: (2.5 tablet)       lidocaine 5 % ointment    XYLOCAINE     SANJANA TOPICALLY QID PRN       Lidocaine HCl Monohydrate Powd          MAGNESIUM CITRATE PO      Take 400 mg by mouth daily       methocarbamol 750 MG tablet    ROBAXIN    120 tablet    Take 1 tablet (750 mg) by mouth 4 times daily as needed       mometasone 110 MCG/INH inhaler    ASMANEX 30 METERED DOSES    3 Inhaler    Inhale 1 puff into the lungs daily       montelukast 10 MG tablet    SINGULAIR    120 tablet    TAKE 2 TABLETS BY MOUTH TWICE DAILY       * morphine 15 MG 12 hr tablet   Start taking on:  1/14/2017    MS CONTIN    30 tablet    1 tab (15mg) at bedtime.       * morphine 30 MG 12 hr tablet   Start taking on:  1/14/2017    MS CONTIN    60 tablet    1 tab (30mg) in the morning and in the afternoon + 15mg at bedtime       OMEGA 3 PO      Take 5 mLs by mouth       ondansetron 4 MG ODT tab    ZOFRAN ODT    60 tablet    Take 1-2 tablets (4-8 mg) by mouth every 8 hours as needed for nausea or vomiting       order for DME     2 Units    Equipment being ordered: compression stockings. 20-30 mm or 30 - 40 mm as patient can tolerate. Physical therapy to determine if they should be above or below the knee.       polyethylene glycol powder    MIRALAX    510 g    Take 17 g by mouth daily       potassium chloride SA 20 MEQ CR tablet    potassium chloride    90 tablet    Take 1 tablet (20 mEq) by mouth daily       PROBIOTIC DAILY PO      Take 1 capsule by mouth daily Lacto acid bifidobacterium        ranitidine 150 MG tablet    ZANTAC    45 tablet    TAKE 1/2 TABLET BY MOUTH THREE TIMES DAILY       * SUMAtriptan 50 MG tablet    IMITREX    20 tablet    TAKE 1 TABLET BY MOUTH AT ONSET OF HEADACHE. MAY REPEAT IN 2 HOURS IF NEEDED. MAX OF 2 TABLETS DAILY       * SUMAtriptan 50 MG tablet    IMITREX    5 tablet    TAKE ONE TABLET BY MOUTH AT ONSET OF HEADACHE. MAY REPEAT IN TWO HOURS AS NEEDED. MAX OF TWO TABLETS BY MOUTH DAILY       tacrolimus 0.1 % ointment    PROTOPIC    60 g    Apply topically 2 times daily       tamoxifen 20 MG tablet    NOLVADEX    90 tablet    Take 1 tablet (20 mg) by mouth daily       triamcinolone 0.025 % ointment    KENALOG    80 g    Apply topically 2 times daily Mix with 1 lb jar of vaseline or aquaphor       TUMS 500 MG chewable tablet   Generic drug:  calcium carbonate     180 chew tab    Take 2 tablets (1,000 mg) by mouth nightly as needed for other (cramps)       * UNABLE TO FIND      1 tablet 3 times daily MEDICATION NAME: calcium D-Glucarate       * UNABLE TO FIND      2 tablets 3 times daily MEDICATION NAME: Natural D-Hist       * UNABLE TO FIND      MEDICATION NAME: Tumeric 3 capsules daily       * UNABLE TO FIND      1 tablet 3 times daily MEDICATION NAME: Digestzymes       * UNABLE TO FIND      1 tablet daily MEDICATION NAME: Pure Encapsulations       vitamin D3 2000 UNITS Caps     60 capsule    Take 6,000 Units by mouth daily       ZYRTEC ALLERGY 10 MG tablet   Generic drug:  cetirizine     90 tablet    Take 1 tablet (10 mg) by mouth 3 times daily On hold for lab test.       * Notice:  This list has 9 medication(s) that are the same as other medications prescribed for you. Read the directions carefully, and ask your doctor or other care provider to review them with you.

## 2017-01-13 NOTE — PROGRESS NOTES
Met with patient and gave business card and how to reach the [facility]. Gave brochure for [employer] for ongoing support.  Answered all patient's questions and verbalized understanding. Seema Chadwick RN, BSN.

## 2017-01-13 NOTE — Clinical Note
1/13/2017       RE: Tisha Arias  [address] [place] [address]     Dear Colleague,    Thank you for referring your patient, Tisha Arias, to the [facility]. Please see a copy of my visit note below.    January 18, 2017    REASON FOR VISIT:  followup for stage I breast cancer.       HISTORY OF PRESENT ILLNESS:  The patient is a 56-year-old female, postmenopausal, with breast cancer with a history of T1c N0 M0, infiltrating ductal carcinoma of the right breast with a strong family history of breast cancer, BRCA1 and 2 gene testing negative.  She was diagnosed with breast cancer in 12/2011.  She underwent bilateral mastectomy with immediate reconstruction on 01/30/2012 by Dr. Daugherty.  Her final pathology revealed 1.6-cm, infiltrating ductal carcinoma, grade 1/3, no angiolymphatic space invasion, no nipple or skin invasion.  There was associated DCIS, and the margins were free of tumor superiorly, anterior margin by 0.1 cm, and widely free at remaining margins.  This was ER/MS-positive, HER2-negative in the vast majority of cells and non-amplified with a ratio of 1.1.  She had an Oncotype test performed, which revealed a low-risk score of 11.  That put her overall risk of recurrence at about 7% after 5 years of tamoxifen.  She had significant complications postoperatively and eventually in 02/2012, she was started on Arimidex.  Also in 12/2012, she had prophylactic hysterectomy and oophorectomy, the pathology of which was benign.  Her course has been complicated by extreme chest wall pain, myofascial pain, and neuropathic pain.  She has gone through several different clinics and was put on several different medications, which were not doing her any good.  Eventually she was weaned off all the medications and is currently doing only PT with myofascial pain maneuvers with symptomatic improvement.      Incidentally, given that she was having so many symptoms, she underwent  a PET scan in 4/2013, which revealed a thyroid nodule which was biopsied and was consistent with papillary carcinoma.  She has undergone resection as well as radioiodine treatment since and has done relatively well from the surgery.  She follows up with Dr. Castro for the same.  She unfortunately required etoh ablation therapy over the thyroid lymph nodes.   She has not had all of the nodes treated; she remains under the care of Dr Avelina Castro.      Due to severe myofascial pain, arthralgia, she was switched to tamoxifen in 2/2014.    She had a diagnosis of mast cell activation syndrome in 2015; she sees Dr Brian Avendaño for this.    INTERVAL HISTORY:Elvin returns today for followup.     Elvin returns today for followup.  She remains on tamoxifen.  She has occasional hot flashes.  She feels otherwise that she is tolerating the medication reasonably well.  She continues to have difficulty with myofascial pain for which she is seeing Dr Edu Benitez.  SHe remains down about this, but she is able to keep working and is managing.        She has no chest pain or shortness of breath.  She reports occasional nausea and mild emesis.  She uses Zofran as needed.  Her 10-point review of systems is otherwise negative.          PAST MEDICAL HISTORY:     Past Medical History   Diagnosis Date     Seasonal allergic rhinitis 4/12/11 skin tests pos. for: dog/M/T/G/W--NEGATIVE FOOD TESTS FOR: shrimp, crab, lobster, coconut     Seasonal allergic conjunctivitis      Allergic rhinitis due to animal dander      Rhinitis, allergic to other allergen      Food intolerance NOT food allergic--oral allergy syndrome with pollens and raw/fresh fruit/vegetables. No real need for Epipen for this alone.     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      section       Mastectomy, reconstruct breast, combined  2012     Procedure:COMBINED MASTECTOMY, RECONSTRUCT BREAST; Bilateral Mastectomies, Right Axillary Paoli Node Biopsy, Bilateral Breast Reconstruction with Tissue Expanders, Reconstruction of inframammary fold, bilateral pain management systems; Surgeon:ANUPAM CAMPBELL; Location:[facility] OR     Irrigation and debridement breast  3/1/2012     Procedure:IRRIGATION AND DEBRIDEMENT BREAST; Irrigation and Debridement, Wound Closure Right Breast; Surgeon:JUNG GUILLEN; Location:[facility] OR     Reconstruct breast  2012     Procedure: RECONSTRUCT BREAST;  Bilateral 2nd stage breast reconstruction, revision,       Hysterectomy, pap no longer indicated       DeVinci assister lap hyst with BSO     Davinci hysterectomy total, bilateral salpingo-oophorectomy, combined  2012     Procedure: COMBINED DAVINCI HYSTERECTOMY TOTAL, SALPINGO-OOPHORECTOMY;  Davinci Total Laparoscopic Hysterectomy, Bilateral Salpingo Oophorectomy, Pelvic Washings, Cystoscopy;  Surgeon: Evie Sheikh MD;  Location: [facility] OR     Thyroidectomy  7/10/2013     Procedure:  THYROIDECTOMY;  Total Thyroidectomy with central neck dissection;  Surgeon: Indiana Sneed MD;  Location: [facility] OR     Cosmetic surgery  8-     Final Stage of Mastectomy     Gi surgery  11-     Rachael-en Y w cholecystectomy     Soft tissue surgery  1-30-12     Mastectomy w severe myofascial pain syndrome     Back surgery  2000,     Bulging disc w nerve root impigment       MEDICATIONS:        Current Outpatient Prescriptions   Medication Sig Dispense Refill     Lidocaine HCl Monohydrate POWD        baclofen (LIORESAL) 10 MG tablet Take 1.5 tablets (15 mg) by mouth 3 times daily 90 tablet 3     SUMAtriptan (IMITREX) 50 MG tablet TAKE ONE TABLET BY MOUTH AT ONSET OF HEADACHE. MAY REPEAT IN TWO HOURS AS NEEDED. MAX OF TWO TABLETS BY MOUTH DAILY 5 tablet 0     morphine (MS CONTIN) 15 MG 12 hr tablet 1 tab (15mg) at bedtime. 30 tablet 0     morphine (MS CONTIN) 30 MG 12 hr tablet 1 tab (30mg) in the morning and in the afternoon + 15mg at bedtime 60 tablet 0     COMPOUND (CMPD RX) - PHARMACY TO MIX COMPOUNDED MEDICATION Apply small amount to affected area two times daily.Ketamine 6% + ketoprofen 10% + lidocaine 10% in Lipo. 120 g 6     celecoxib (CELEBREX) 200 MG capsule Take 1 capsule (200 mg) by mouth 2 times daily 180 capsule 2     cromolyn (OPTICROM) 4 % ophthalmic solution INSTILL 1 DROP IN BOTH EYES FOUR TIMES DAILY 10 mL 3     methocarbamol (ROBAXIN) 750 MG tablet Take 1 tablet (750 mg) by mouth 4 times daily as needed 120 tablet 2     diazepam (VALIUM) 5 MG tablet Take 1 tablet (5 mg) by mouth 3 times daily as needed For muscle spasms 90 tablet 2     calcitRIOL (ROCALTROL) 0.25 MCG capsule 2 tabs in am and 1 tab qhs 270 capsule 3     hydrochlorothiazide (MICROZIDE) 12.5 MG capsule Take 1 capsule (12.5 mg) by mouth daily 90 capsule 3     levothyroxine (SYNTHROID, LEVOTHROID) 112 MCG tablet Monday-Saturday: (2 tablet/day);   Sunday: (2.5 tablet) 189 tablet 3     Cholecalciferol (VITAMIN D3) 2000  UNITS CAPS Take 6,000 Units by mouth daily 60 capsule 4     aluminum chloride (DRYSOL) 20 % external solution Apply topically At Bedtime 60 mL 3     tacrolimus (PROTOPIC) 0.1 % ointment Apply topically 2 times daily 60 g 3     triamcinolone (KENALOG) 0.025 % ointment Apply topically 2 times daily Mix with 1 lb jar of vaseline or aquaphor 80 g 3     ondansetron (ZOFRAN ODT) 4 MG disintegrating tablet Take 1-2 tablets (4-8 mg) by mouth every 8 hours as needed for nausea or vomiting 60 tablet 6     potassium chloride SA (POTASSIUM CHLORIDE) 20 MEQ tablet Take 1 tablet (20 mEq) by mouth daily 90 tablet 3     lidocaine (XYLOCAINE) 5 % ointment SANJANA TOPICALLY QID PRN  3     EPINEPHrine (EPIPEN 2-CARIDAD) 0.3 MG/0.3ML injection Inject 0.3 mLs (0.3 mg) into the muscle once as needed for anaphylaxis 2 each 4     ranitidine (ZANTAC) 150 MG tablet TAKE 1/2 TABLET BY MOUTH THREE TIMES DAILY 45 tablet 3     Cromolyn Sodium ([facility] CROMOLYN SODIUM) 5.2 MG/ACT AERS Spray 1 spray (1 mL) in nostril daily 1 Bottle 6     tamoxifen (NOLVADEX) 20 MG tablet Take 1 tablet (20 mg) by mouth daily 90 tablet 3     albuterol (VENTOLIN HFA) 108 (90 BASE) MCG/ACT inhaler Inhale 2 puffs into the lungs every 4 hours as needed for shortness of breath / dyspnea or wheezing 18 g 5     UNABLE TO FIND MEDICATION NAME: Tumeric 3 capsules daily       cetirizine (ZYRTEC ALLERGY) 10 MG tablet Take 1 tablet (10 mg) by mouth 3 times daily On hold for lab test. 90 tablet 6     diclofenac (VOLTAREN) 1 % GEL Apply affected area two times daily PRN using enclosed dosing card. 100 g 1     mometasone (ASMANEX 30 METERED DOSES) 110 MCG/INH inhaler Inhale 1 puff into the lungs daily 3 Inhaler 3     SUMAtriptan (IMITREX) 50 MG tablet TAKE 1 TABLET BY MOUTH AT ONSET OF HEADACHE. MAY REPEAT IN 2 HOURS IF NEEDED. MAX OF 2 TABLETS DAILY 20 tablet 4     UNABLE TO FIND 2 tablets 3 times daily MEDICATION NAME: Natural D-Hist       MAGNESIUM CITRATE PO Take 400 mg by mouth daily        DiphenhydrAMINE HCl (BENADRYL PO) Take 50 mg by mouth as needed       calcium citrate-vitamin D (CALCIUM CITRATE +D) 315-250 MG-UNIT TABS Take 2 tablets by mouth 3 times daily 120 tablet      calcium carbonate (TUMS) 500 MG chewable tablet Take 2 tablets (1,000 mg) by mouth nightly as needed for other (cramps) 180 chew tab 3     UNABLE TO FIND 1 tablet 3 times daily MEDICATION NAME: calcium D-Glucarate       Digestive Enzymes (BETAINE HCL PO) Take 2 tablets by mouth as needed Betaine HCL and Pepsin: Take 1-7 tabs by mouth daily, if burning take one tablet less once daily.  Betaine HCL 1.04 g  Pepsin 40 mg       Triamcinolone Acetonide (AZMACORT IN) Inhale 2 puffs into the lungs as needed       UNABLE TO FIND 1 tablet 3 times daily MEDICATION NAME: Digestzymes       UNABLE TO FIND 1 tablet daily MEDICATION NAME: Pure Encapsulations       cyclobenzaprine (FLEXERIL) 10 MG tablet Take 10 mg by mouth 3 times daily as needed On hold Dr Gricel Lin     Omega-3 Fatty Acids (OMEGA 3 PO) Take 5 mLs by mouth       ACAI PO Take 1,000 mg by mouth 2 times daily       Probiotic Product (PROBIOTIC DAILY PO) Take 1 capsule by mouth daily Lacto acid bifidobacterium       polyethylene glycol (MIRALAX) powder Take 17 g by mouth daily 510 g 1     docusate sodium 100 MG tablet Take 100 mg by mouth daily 60 tablet 1     ORDER FOR DME Equipment being ordered: compression stockings. 20-30 mm or 30 - 40 mm as patient can tolerate. Physical therapy to determine if they should be above or below the knee. 2 Units 4     montelukast (SINGULAIR) 10 MG tablet TAKE TWO TABLETS BY MOUTH TWICE DAILY 120 tablet 5     COLCRYS 0.6 MG tablet One tab up to three daily for costochondritis (chest) discomfort. Scale back use to prn with loose stools or bloating. 270 tablet 1     cromolyn (GASTROCROM) 100 MG/5ML solution Take 10 mLs (200 mg) by mouth 4 times daily 1440 mL 3        PHYSICAL EXAMINATION:    VITAL SIGNS:/85 mmHg  Pulse 93  Temp(Src)  98.1  F (36.7  C) (Oral)  Resp 12  Ht   Wt   SpO2 98%    GENERAL:  She is well-appearing, in no apparent distress.    HEENT:  Exam reveals no icterus or pallor.  Oropharynx is clear with no ulcers or lesions.    NECK:  Supple.  No supraclavicular or cervical lymphadenopathy.  She has a scar from thyroid surgery.     HEART:  Regular rate and rhythm.  S1, S2 clear.  No murmur, gallop or rub.    LUNGS:  Clear to auscultation bilaterally.    ABDOMEN:  Soft, nontender, nondistended, no organomegaly.    BREAST EXAM:  Very sensitive to even light touch. She is status post bilateral mastectomy with immediate reconstruction.    EXTREMITIES:  She has no lower extremity edema.    SKIN: no rash.     LABORATORY:  No lab draws were done before the visit for today.          ASSESSMENT AND PLAN:  The patient is a 56-year-old female with a history of stage I, ER/TX-positive, HER2-negative breast cancer with Oncotype recurrence score of 11 with a diagnosis of stage III papillary thyroid cancer which has been resected and has also undergone radioiodine treatment.      1. Breast cancer - She has remained on Arimidex since 02/2012.  In February 2014, switched to tamoxifen given her ongoing arthralgias.    She is overall doing better.   She has minimal side effects from the tamoxifen, and is tolerating it okay.  We will plan on 10 years of endocrine therapy  As per the ATLAS and ATTOM trials.  She should have an annual GYN exam while on tamoxifen.  Discussed I'd like her to see her PCP for this.     We will continue to see her every six months.      2. Papillary thyroid cancer, it is being followed by Dr. Avelina Castro.  She underwent etoh ablation x3 so far.    She follows Dr Castro.    3.  Synchronous thyroid cancer and breast cancer.  She is brca 1,2 negative.  Additional testing is negative.       4. She continues with myofascial PT.  She is seeing the [facility], the [facility], Dr Miracle Morin at [facility]  [place].  She is now also seeing the palliative care here    5.Mast cell activation syndrome - she is seeing Dr Avendaño.         Shahla Crawley M.D.    Hematology and Oncology  [facility]  [phone]

## 2017-01-13 NOTE — NURSING NOTE
"Tisha Arias is a 56 year old female who presents for:  Chief Complaint   Patient presents with     Oncology Clinic Visit     breast ca        Initial Vitals:  /85 mmHg  Pulse 93  Temp(Src) 98.1  F (36.7  C) (Oral)  Resp 12  Ht   Wt   SpO2 98% Estimated body mass index is 33.55 kg/(m^2) as calculated from the following:    Height as of 12/20/16: 1.651 m (5' 5\").    Weight as of 7/12/16: 91.445 kg (201 lb 9.6 oz).. There is no height or weight on file to calculate BSA. BP completed using cuff size: regular  Data Unavailable No LMP recorded. Patient has had a hysterectomy. Allergies and medications reviewed.     Medications: Medication refills not needed today.  Pharmacy name entered into [facility]:    [facility] [address] [place] [address] [address] AT SEC OF [address] [facility] - [place], [place] - [facility] [address], [address]    Comments: pt feeling pain on chert, head at level 8    7 minutes for nursing intake (face to face time)   Britney Espitia CMA          "

## 2017-01-16 DIAGNOSIS — D89.42 IDIOPATHIC MAST CELL ACTIVATION SYNDROME (H): Primary | Chronic | ICD-10-CM

## 2017-01-16 RX ORDER — MONTELUKAST SODIUM 10 MG/1
TABLET ORAL
Qty: 120 TABLET | Refills: 5 | Status: SHIPPED | OUTPATIENT
Start: 2017-01-16 | End: 2017-06-30

## 2017-01-17 NOTE — TELEPHONE ENCOUNTER
Routing to provider, patient has not called back and has not read [employer] message.    Sera Mtz RN

## 2017-01-18 NOTE — PROGRESS NOTES
January 18, 2017    REASON FOR VISIT:  followup for stage I breast cancer.       HISTORY OF PRESENT ILLNESS:  The patient is a 56-year-old female, postmenopausal, with breast cancer with a history of T1c N0 M0, infiltrating ductal carcinoma of the right breast with a strong family history of breast cancer, BRCA1 and 2 gene testing negative.  She was diagnosed with breast cancer in 12/2011.  She underwent bilateral mastectomy with immediate reconstruction on 01/30/2012 by Dr. Daugherty.  Her final pathology revealed 1.6-cm, infiltrating ductal carcinoma, grade 1/3, no angiolymphatic space invasion, no nipple or skin invasion.  There was associated DCIS, and the margins were free of tumor superiorly, anterior margin by 0.1 cm, and widely free at remaining margins.  This was ER/WI-positive, HER2-negative in the vast majority of cells and non-amplified with a ratio of 1.1.  She had an Oncotype test performed, which revealed a low-risk score of 11.  That put her overall risk of recurrence at about 7% after 5 years of tamoxifen.  She had significant complications postoperatively and eventually in 02/2012, she was started on Arimidex.  Also in 12/2012, she had prophylactic hysterectomy and oophorectomy, the pathology of which was benign.  Her course has been complicated by extreme chest wall pain, myofascial pain, and neuropathic pain.  She has gone through several different clinics and was put on several different medications, which were not doing her any good.  Eventually she was weaned off all the medications and is currently doing only PT with myofascial pain maneuvers with symptomatic improvement.      Incidentally, given that she was having so many symptoms, she underwent a PET scan in 4/2013, which revealed a thyroid nodule which was biopsied and was consistent with papillary carcinoma.  She has undergone resection as well as radioiodine treatment since and has done relatively well from the surgery.  She follows up  with Dr. Castro for the same.  She unfortunately required etoh ablation therapy over the thyroid lymph nodes.   She has not had all of the nodes treated; she remains under the care of Dr Avelina Castro.      Due to severe myofascial pain, arthralgia, she was switched to tamoxifen in 2/2014.    She had a diagnosis of mast cell activation syndrome in 2015; she sees Dr Brian Avendaño for this.    INTERVAL HISTORY:Elvin returns today for followup.     Elvin returns today for followup.  She remains on tamoxifen.  She has occasional hot flashes.  She feels otherwise that she is tolerating the medication reasonably well.  She continues to have difficulty with myofascial pain for which she is seeing Dr Edu Benitez.  SHe remains down about this, but she is able to keep working and is managing.        She has no chest pain or shortness of breath.  She reports occasional nausea and mild emesis.  She uses Zofran as needed.  Her 10-point review of systems is otherwise negative.          PAST MEDICAL HISTORY:     Past Medical History   Diagnosis Date     Seasonal allergic rhinitis 4/12/11 skin tests pos. for: dog/M/T/G/W--NEGATIVE FOOD TESTS FOR: shrimp, crab, lobster, coconut     Seasonal allergic conjunctivitis      Allergic rhinitis due to animal dander      Rhinitis, allergic to other allergen      Food intolerance NOT food allergic--oral allergy syndrome with pollens and raw/fresh fruit/vegetables. No real need for Epipen for this alone.     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     present since January 2012     Gestational hypertension      GDM  (gestational diabetes mellitus)      w first pregnancy only     Post-surgical hypothyroidism           PAST SURGICAL HISTORY:        Past Surgical History   Procedure Laterality Date     Gyn surgery  89,1/10/92     2 c-sections     Bypass gastric, cholecystectomy, combined       Biopsy breast       right     Biopsy breast  11     right, core and sterotactic     Colonoscopy       normal     Ent surgery       Tonsillectomy       childhood     C laparoscopic gastric restrictive px, w/gastric bypass/ gregory-en-y, < 150cm       Gregory en Y?      section       Mastectomy, reconstruct breast, combined  2012     Procedure:COMBINED MASTECTOMY, RECONSTRUCT BREAST; Bilateral Mastectomies, Right Axillary Medfield Node Biopsy, Bilateral Breast Reconstruction with Tissue Expanders, Reconstruction of inframammary fold, bilateral pain management systems; Surgeon:ANUPAM CAMPBELL; Location:[facility] OR     Irrigation and debridement breast  3/1/2012     Procedure:IRRIGATION AND DEBRIDEMENT BREAST; Irrigation and Debridement, Wound Closure Right Breast; Surgeon:JUNG GUILLEN; Location:[facility] OR     Reconstruct breast  2012     Procedure: RECONSTRUCT BREAST;  Bilateral 2nd stage breast reconstruction, revision,       Hysterectomy, pap no longer indicated       DeVinci assister lap hyst with BSO     Davinci hysterectomy total, bilateral salpingo-oophorectomy, combined  2012     Procedure: COMBINED DAVINCI HYSTERECTOMY TOTAL, SALPINGO-OOPHORECTOMY;  Davinci Total Laparoscopic Hysterectomy, Bilateral Salpingo Oophorectomy, Pelvic Washings, Cystoscopy;  Surgeon: Evie Sheikh MD;  Location: [facility] OR     Thyroidectomy  7/10/2013     Procedure: THYROIDECTOMY;  Total Thyroidectomy with central neck dissection;  Surgeon: Indiana Sneed MD;  Location: [facility] OR     Cosmetic surgery  2012     Final Stage of Mastectomy     Gi surgery  2007     Gregory-en Y w cholecystectomy     Soft  tissue surgery  1-30-12     Mastectomy w severe myofascial pain syndrome     Back surgery  2000,     Bulging disc w nerve root impigment       MEDICATIONS:        Current Outpatient Prescriptions   Medication Sig Dispense Refill     Lidocaine HCl Monohydrate POWD        baclofen (LIORESAL) 10 MG tablet Take 1.5 tablets (15 mg) by mouth 3 times daily 90 tablet 3     SUMAtriptan (IMITREX) 50 MG tablet TAKE ONE TABLET BY MOUTH AT ONSET OF HEADACHE. MAY REPEAT IN TWO HOURS AS NEEDED. MAX OF TWO TABLETS BY MOUTH DAILY 5 tablet 0     morphine (MS CONTIN) 15 MG 12 hr tablet 1 tab (15mg) at bedtime. 30 tablet 0     morphine (MS CONTIN) 30 MG 12 hr tablet 1 tab (30mg) in the morning and in the afternoon + 15mg at bedtime 60 tablet 0     COMPOUND (CMPD RX) - PHARMACY TO MIX COMPOUNDED MEDICATION Apply small amount to affected area two times daily.Ketamine 6% + ketoprofen 10% + lidocaine 10% in Lipo. 120 g 6     celecoxib (CELEBREX) 200 MG capsule Take 1 capsule (200 mg) by mouth 2 times daily 180 capsule 2     cromolyn (OPTICROM) 4 % ophthalmic solution INSTILL 1 DROP IN BOTH EYES FOUR TIMES DAILY 10 mL 3     methocarbamol (ROBAXIN) 750 MG tablet Take 1 tablet (750 mg) by mouth 4 times daily as needed 120 tablet 2     diazepam (VALIUM) 5 MG tablet Take 1 tablet (5 mg) by mouth 3 times daily as needed For muscle spasms 90 tablet 2     calcitRIOL (ROCALTROL) 0.25 MCG capsule 2 tabs in am and 1 tab qhs 270 capsule 3     hydrochlorothiazide (MICROZIDE) 12.5 MG capsule Take 1 capsule (12.5 mg) by mouth daily 90 capsule 3     levothyroxine (SYNTHROID, LEVOTHROID) 112 MCG tablet Monday-Saturday: (2 tablet/day);   Sunday: (2.5 tablet) 189 tablet 3     Cholecalciferol (VITAMIN D3) 2000 UNITS CAPS Take 6,000 Units by mouth daily 60 capsule 4     aluminum chloride (DRYSOL) 20 % external solution Apply topically At Bedtime 60 mL 3     tacrolimus (PROTOPIC) 0.1 % ointment Apply topically 2 times daily 60 g 3     triamcinolone (KENALOG)  0.025 % ointment Apply topically 2 times daily Mix with 1 lb jar of vaseline or aquaphor 80 g 3     ondansetron (ZOFRAN ODT) 4 MG disintegrating tablet Take 1-2 tablets (4-8 mg) by mouth every 8 hours as needed for nausea or vomiting 60 tablet 6     potassium chloride SA (POTASSIUM CHLORIDE) 20 MEQ tablet Take 1 tablet (20 mEq) by mouth daily 90 tablet 3     lidocaine (XYLOCAINE) 5 % ointment SANJANA TOPICALLY QID PRN  3     EPINEPHrine (EPIPEN 2-CARIDAD) 0.3 MG/0.3ML injection Inject 0.3 mLs (0.3 mg) into the muscle once as needed for anaphylaxis 2 each 4     ranitidine (ZANTAC) 150 MG tablet TAKE 1/2 TABLET BY MOUTH THREE TIMES DAILY 45 tablet 3     Cromolyn Sodium ([employer] CROMOLYN SODIUM) 5.2 MG/ACT AERS Spray 1 spray (1 mL) in nostril daily 1 Bottle 6     tamoxifen (NOLVADEX) 20 MG tablet Take 1 tablet (20 mg) by mouth daily 90 tablet 3     albuterol (VENTOLIN HFA) 108 (90 BASE) MCG/ACT inhaler Inhale 2 puffs into the lungs every 4 hours as needed for shortness of breath / dyspnea or wheezing 18 g 5     UNABLE TO FIND MEDICATION NAME: Tumeric 3 capsules daily       cetirizine (ZYRTEC ALLERGY) 10 MG tablet Take 1 tablet (10 mg) by mouth 3 times daily On hold for lab test. 90 tablet 6     diclofenac (VOLTAREN) 1 % GEL Apply affected area two times daily PRN using enclosed dosing card. 100 g 1     mometasone (ASMANEX 30 METERED DOSES) 110 MCG/INH inhaler Inhale 1 puff into the lungs daily 3 Inhaler 3     SUMAtriptan (IMITREX) 50 MG tablet TAKE 1 TABLET BY MOUTH AT ONSET OF HEADACHE. MAY REPEAT IN 2 HOURS IF NEEDED. MAX OF 2 TABLETS DAILY 20 tablet 4     UNABLE TO FIND 2 tablets 3 times daily MEDICATION NAME: Natural D-Hist       MAGNESIUM CITRATE PO Take 400 mg by mouth daily       DiphenhydrAMINE HCl (BENADRYL PO) Take 50 mg by mouth as needed       calcium citrate-vitamin D (CALCIUM CITRATE +D) 315-250 MG-UNIT TABS Take 2 tablets by mouth 3 times daily 120 tablet      calcium carbonate (TUMS) 500 MG chewable tablet Take 2  tablets (1,000 mg) by mouth nightly as needed for other (cramps) 180 chew tab 3     UNABLE TO FIND 1 tablet 3 times daily MEDICATION NAME: calcium D-Glucarate       Digestive Enzymes (BETAINE HCL PO) Take 2 tablets by mouth as needed Betaine HCL and Pepsin: Take 1-7 tabs by mouth daily, if burning take one tablet less once daily.  Betaine HCL 1.04 g  Pepsin 40 mg       Triamcinolone Acetonide (AZMACORT IN) Inhale 2 puffs into the lungs as needed       UNABLE TO FIND 1 tablet 3 times daily MEDICATION NAME: Digestzymes       UNABLE TO FIND 1 tablet daily MEDICATION NAME: Pure Encapsulations       cyclobenzaprine (FLEXERIL) 10 MG tablet Take 10 mg by mouth 3 times daily as needed On hold Dr Gricel Lin     Omega-3 Fatty Acids (OMEGA 3 PO) Take 5 mLs by mouth       ACAI PO Take 1,000 mg by mouth 2 times daily       Probiotic Product (PROBIOTIC DAILY PO) Take 1 capsule by mouth daily Lacto acid bifidobacterium       polyethylene glycol (MIRALAX) powder Take 17 g by mouth daily 510 g 1     docusate sodium 100 MG tablet Take 100 mg by mouth daily 60 tablet 1     ORDER FOR DME Equipment being ordered: compression stockings. 20-30 mm or 30 - 40 mm as patient can tolerate. Physical therapy to determine if they should be above or below the knee. 2 Units 4     montelukast (SINGULAIR) 10 MG tablet TAKE TWO TABLETS BY MOUTH TWICE DAILY 120 tablet 5     COLCRYS 0.6 MG tablet One tab up to three daily for costochondritis (chest) discomfort. Scale back use to prn with loose stools or bloating. 270 tablet 1     cromolyn (GASTROCROM) 100 MG/5ML solution Take 10 mLs (200 mg) by mouth 4 times daily 1440 mL 3        PHYSICAL EXAMINATION:    VITAL SIGNS:/85 mmHg  Pulse 93  Temp(Src) 98.1  F (36.7  C) (Oral)  Resp 12  Ht   Wt   SpO2 98%    GENERAL:  She is well-appearing, in no apparent distress.    HEENT:  Exam reveals no icterus or pallor.  Oropharynx is clear with no ulcers or lesions.    NECK:  Supple.  No supraclavicular or  cervical lymphadenopathy.  She has a scar from thyroid surgery.     HEART:  Regular rate and rhythm.  S1, S2 clear.  No murmur, gallop or rub.    LUNGS:  Clear to auscultation bilaterally.    ABDOMEN:  Soft, nontender, nondistended, no organomegaly.    BREAST EXAM:  Very sensitive to even light touch. She is status post bilateral mastectomy with immediate reconstruction.    EXTREMITIES:  She has no lower extremity edema.    SKIN: no rash.     LABORATORY:  No lab draws were done before the visit for today.          ASSESSMENT AND PLAN:  The patient is a 56-year-old female with a history of stage I, ER/NV-positive, HER2-negative breast cancer with Oncotype recurrence score of 11 with a diagnosis of stage III papillary thyroid cancer which has been resected and has also undergone radioiodine treatment.      1. Breast cancer - She has remained on Arimidex since 02/2012.  In February 2014, switched to tamoxifen given her ongoing arthralgias.    She is overall doing better.   She has minimal side effects from the tamoxifen, and is tolerating it okay.  We will plan on 10 years of endocrine therapy  As per the ATLAS and ATTOM trials.  She should have an annual GYN exam while on tamoxifen.  Discussed I'd like her to see her PCP for this.     We will continue to see her every six months.      2. Papillary thyroid cancer, it is being followed by Dr. Avelina Castro.  She underwent etoh ablation x3 so far.    She follows Dr Castro.    3.  Synchronous thyroid cancer and breast cancer.  She is brca 1,2 negative.  Additional testing is negative.       4. She continues with myofascial PT.  She is seeing the [facility], the [facility], Dr Miracle Morin at [facility].  She is now also seeing the palliative care here    5.Mast cell activation syndrome - she is seeing Dr Avendaño.         Shahla Crawley M.D.    Hematology and Oncology  [facility]  [phone]

## 2017-01-19 ENCOUNTER — MYC MEDICAL ADVICE (OUTPATIENT)
Dept: PALLIATIVE CARE | Facility: CLINIC | Age: 57
End: 2017-01-19

## 2017-01-19 DIAGNOSIS — G43.909 MIGRAINE WITHOUT STATUS MIGRAINOSUS, NOT INTRACTABLE, UNSPECIFIED MIGRAINE TYPE: Primary | ICD-10-CM

## 2017-01-20 ENCOUNTER — MYC MEDICAL ADVICE (OUTPATIENT)
Dept: PALLIATIVE CARE | Facility: CLINIC | Age: 57
End: 2017-01-20

## 2017-01-20 NOTE — TELEPHONE ENCOUNTER
SUMAtriptan (IMITREX) 50 MG tablet      Last Written Prescription Date: 1/9/2017  Last Fill Quantity: 5, # refills: 0  Last Office Visit with [facility], [facility] or [facility] prescribing provider: 12/29/2016       BP Readings from Last 3 Encounters:   01/13/17 149/85   01/06/17 166/85   12/20/16 149/85

## 2017-01-23 PROBLEM — Z85.3 PERSONAL HISTORY OF MALIGNANT NEOPLASM OF BREAST: Status: ACTIVE | Noted: 2017-01-23

## 2017-01-24 RX ORDER — SUMATRIPTAN 50 MG/1
TABLET, FILM COATED ORAL
Qty: 5 TABLET | Refills: 3 | Status: SHIPPED | OUTPATIENT
Start: 2017-01-24 | End: 2017-03-25

## 2017-01-24 NOTE — TELEPHONE ENCOUNTER
Routing refill request to provider for review/approval because:  Has not been seen by Dr. Orona in over 1 year.  Lucia Stein RN

## 2017-02-01 DIAGNOSIS — M94.0 COSTAL CHONDRITIS: Primary | ICD-10-CM

## 2017-02-01 RX ORDER — COLCHICINE 0.6 MG/1
0.6 CAPSULE ORAL 3 TIMES DAILY
Qty: 270 CAPSULE | Refills: 1 | Status: SHIPPED | OUTPATIENT
Start: 2017-02-01 | End: 2017-03-29

## 2017-02-03 ENCOUNTER — TELEPHONE (OUTPATIENT)
Dept: ONCOLOGY | Facility: CLINIC | Age: 57
End: 2017-02-03

## 2017-02-03 NOTE — TELEPHONE ENCOUNTER
Called Elvin and left her a voicemail re: Cr. Requested her to call me back, but in case she doesn't not have a chance to informed her that a generic form of the medication she is requesting is at her preferred pharmacy. Informed her it is the same dose and instructions, just in capsule form instead of tab, as this is what her insurance is agreeable to. Left call back number.    Emma Aquino RN

## 2017-02-06 ENCOUNTER — TELEPHONE (OUTPATIENT)
Dept: FAMILY MEDICINE | Facility: CLINIC | Age: 57
End: 2017-02-06

## 2017-02-06 NOTE — TELEPHONE ENCOUNTER
Called [facility] let them know Dr. Orona will not be doing the Appeal because she did not prescribe the medication nor do the PA in the first  Place so this would need to go to that provider. Dr. Farrell at the [facility]. They told to shred the papers.  Dionne Powell  Team Layla,

## 2017-02-06 NOTE — TELEPHONE ENCOUNTER
Reason for Call:  Other prescription    Detailed comments: Insurance company calling on behalf of patient. Patient would like PCP to appeal the prior auth  Denial of [employer]. Phillip will be faxing over the paperwork. Please advise    Phone Number Patient can be reached at: Other phone number:  [phone]      Best Time: any time    Can we leave a detailed message on this number? NO    Call taken on 2/6/2017 at 1:08 PM by Dot Ferrer

## 2017-02-09 ENCOUNTER — MYC MEDICAL ADVICE (OUTPATIENT)
Dept: ONCOLOGY | Facility: CLINIC | Age: 57
End: 2017-02-09

## 2017-02-13 DIAGNOSIS — R07.89 CHEST WALL PAIN: ICD-10-CM

## 2017-02-13 RX ORDER — MORPHINE SULFATE 30 MG/1
TABLET, FILM COATED, EXTENDED RELEASE ORAL
Qty: 60 TABLET | Refills: 0 | Status: SHIPPED | OUTPATIENT
Start: 2017-02-13 | End: 2017-03-01

## 2017-02-13 RX ORDER — DIAZEPAM 5 MG
5 TABLET ORAL 3 TIMES DAILY PRN
Qty: 90 TABLET | Refills: 2 | Status: SHIPPED | OUTPATIENT
Start: 2017-02-13 | End: 2017-04-26

## 2017-02-13 RX ORDER — MORPHINE SULFATE 15 MG/1
TABLET, FILM COATED, EXTENDED RELEASE ORAL
Qty: 30 TABLET | Refills: 0 | Status: SHIPPED | OUTPATIENT
Start: 2017-02-13 | End: 2017-03-01

## 2017-02-14 ENCOUNTER — TELEPHONE (OUTPATIENT)
Dept: ONCOLOGY | Facility: CLINIC | Age: 57
End: 2017-02-14

## 2017-02-14 DIAGNOSIS — M79.18 MYOFASCIAL PAIN: ICD-10-CM

## 2017-02-14 DIAGNOSIS — C50.919 MALIGNANT NEOPLASM OF FEMALE BREAST, UNSPECIFIED LATERALITY, UNSPECIFIED SITE OF BREAST: ICD-10-CM

## 2017-02-14 RX ORDER — TAMOXIFEN CITRATE 20 MG/1
20 TABLET ORAL DAILY
Qty: 90 TABLET | Refills: 3 | Status: ON HOLD | OUTPATIENT
Start: 2017-02-14 | End: 2017-10-31

## 2017-02-14 NOTE — TELEPHONE ENCOUNTER
Tamoxifen 20 mg tablet       Last Written Prescription Date: 2/17/16  Last Fill Quantity: 90,  # refills: 3   Last Office Visit with G, P or [facility] prescribing provider: 1/13/17    Refill routed to Dr. Crawley.

## 2017-02-14 NOTE — TELEPHONE ENCOUNTER
Received fax from [facility] requesting refill of methocarbamol.     Last refill: 12/7/2016  Last office visit: 1/6/2017  Scheduled for follow up 4/7/2017     Will route request to MD for review.     Reviewed [place]  Report.

## 2017-02-14 NOTE — TELEPHONE ENCOUNTER
Called Elvin to follow up on a voicemail she had left re: medication refill frustration. Elvin states that she had called two weeks ago to request a refill. Writer apologized for the miscommunication, and agreed with Elvin that MyChart is a great way to communicate.    Elvin stated that she picked her colchicine up, but that it is blue and she might react to it. Requested that she keep us informed on how she is feeling on it and if she is reacting.    Emma Aquino RN

## 2017-02-15 RX ORDER — METHOCARBAMOL 750 MG/1
750 TABLET, FILM COATED ORAL 4 TIMES DAILY PRN
Qty: 120 TABLET | Refills: 2 | Status: SHIPPED | OUTPATIENT
Start: 2017-02-15 | End: 2017-03-01

## 2017-03-01 ENCOUNTER — MYC REFILL (OUTPATIENT)
Dept: PALLIATIVE CARE | Facility: CLINIC | Age: 57
End: 2017-03-01

## 2017-03-01 ENCOUNTER — MYC REFILL (OUTPATIENT)
Dept: ONCOLOGY | Facility: CLINIC | Age: 57
End: 2017-03-01

## 2017-03-01 DIAGNOSIS — R07.89 CHEST WALL PAIN: ICD-10-CM

## 2017-03-01 DIAGNOSIS — M79.18 MYOFASCIAL PAIN: ICD-10-CM

## 2017-03-01 DIAGNOSIS — D47.02 MAST CELL DISEASE, SYSTEMIC: Chronic | ICD-10-CM

## 2017-03-01 DIAGNOSIS — D89.42 IDIOPATHIC MAST CELL ACTIVATION SYNDROME (H): ICD-10-CM

## 2017-03-01 RX ORDER — CROMOLYN SODIUM 40 MG/ML
SOLUTION/ DROPS OPHTHALMIC
Qty: 10 ML | Refills: 0 | Status: SHIPPED | OUTPATIENT
Start: 2017-03-01 | End: 2017-03-27

## 2017-03-01 NOTE — TELEPHONE ENCOUNTER
Cromolyn (Opticrom) 4% ophthalmic solution      Last Written Prescription Date: 12/8/2016  Last Fill Quantity: 10 mL,  # refills: 3   Last Office Visit with G, P or [facility] prescribing provider: 12/20/2016 with Dr. Avendaño  Next Appt: 3/28/2017 with Dr. Avendaño

## 2017-03-01 NOTE — LETTER
3/3/2017      RE: Tisha Arias  [address]  [place] [address]            To whom it may concern     Ms Arias is a patient long under my care. She has several painful, chronic medical conditions which require frequent medical attention, including physician visits, and therapy visits. These need to continue, are necessary for her health, occur more often than weekly, and will continue long-term        Sincerely,        Edu Benitez MD

## 2017-03-02 NOTE — TELEPHONE ENCOUNTER
Message from "[employer]:  Original authorizing provider: FABIO Dacosta TAMIR Arias would like a refill of the following medications:  ranitidine (ZANTAC) 150 MG tablet [Constance Govea PA-C]    Preferred pharmacy: [facility] [address] [place] [address] [address] AT SEC OF [place] [facility] 10    Comment:  Can I get a 3 month supply of the Robaxin? Also I am changing jobs and I need another letter regarding indicating the need for frequent medical appt (same letter that you provided before). Also can you mail me the prescriptions - Dr Crawley wrote the last order February 13th due to some confusions where my prescription wasn't refilled after placing a phone call for refill.    Medication renewals requested in this message routed to other providers:  morphine (MS CONTIN) 15 MG 12 hr tablet [Shahla Crawley MD]  morphine (MS CONTIN) 30 MG 12 hr tablet [Shahla Crawley MD]  methocarbamol (ROBAXIN) 750 MG tablet [Edu Benitez MD]

## 2017-03-02 NOTE — TELEPHONE ENCOUNTER
Message from [employer]:  Original authorizing provider: Edu Benitez MD    Evlin Arias would like a refill of the following medications:  methocarbamol (ROBAXIN) 750 MG tablet [Edu Benitez MD]    Preferred pharmacy: [facility] [address] [place] [address] [address] AT SEC OF [place] & [facility] 10    Comment:  Can I get a 3 month supply of the Robaxin? Also I am changing jobs and I need another letter regarding indicating the need for frequent medical appt (same letter that you provided before). Also can you mail me the prescriptions - Dr Crawley wrote the last order February 13th due to some confusions where my prescription wasn't refilled after placing a phone call for refill.    Medication renewals requested in this message routed to other providers:  morphine (MS CONTIN) 15 MG 12 hr tablet [Shahla Crawley MD]  morphine (MS CONTIN) 30 MG 12 hr tablet [Shahla Crawley MD]  ranitidine (ZANTAC) 150 MG tablet [Constance Govea PA-C]

## 2017-03-03 RX ORDER — MORPHINE SULFATE 30 MG/1
TABLET, FILM COATED, EXTENDED RELEASE ORAL
Qty: 60 TABLET | Refills: 0 | Status: SHIPPED | OUTPATIENT
Start: 2017-03-13 | End: 2017-03-25

## 2017-03-03 RX ORDER — METHOCARBAMOL 750 MG/1
750 TABLET, FILM COATED ORAL 4 TIMES DAILY PRN
Qty: 360 TABLET | Refills: 1 | Status: SHIPPED | OUTPATIENT
Start: 2017-03-03 | End: 2017-04-26

## 2017-03-03 RX ORDER — MORPHINE SULFATE 15 MG/1
TABLET, FILM COATED, EXTENDED RELEASE ORAL
Qty: 30 TABLET | Refills: 0 | Status: SHIPPED | OUTPATIENT
Start: 2017-03-13 | End: 2017-03-25

## 2017-03-03 NOTE — TELEPHONE ENCOUNTER
Message from [employer]:  Sobeida Clark LPN Thu Mar 2, 2017 4:42 PM    I believe you are following All her pain meds, correct?  ----- Message -----   From: Tisha Arias   Sent: 3/1/2017 2:24 PM   To: Onc Adult Triage-  Subject: Medication Renewal Request     Original authorizing provider: MD Elvin Carrillo would like a refill of the following medications:  morphine (MS CONTIN) 15 MG 12 hr tablet [Shahla Crawley MD]  morphine (MS CONTIN) 30 MG 12 hr tablet [Shahla Crawley MD]    Preferred pharmacy: [facility] [address] [place] [address] [address] AT [facility] OF [place] [address]    Comment:  Can I get a 3 month supply of the Robaxin? Also I am changing jobs and I need another letter regarding indicating the need for frequent medical appt (same letter that you provided before).    Also can you mail me the prescriptions - Dr Crawley wrote the last order February 13th due to some confusions where my prescription wasn't refilled after placing a phone call for refill.    Medication renewals requested in this message routed to other providers:  methocarbamol (ROBAXIN) 750 MG tablet [Edu Benitez MD]  ranitidine (ZANTAC) 150 MG tablet [Constance Govea PA-C]

## 2017-03-07 DIAGNOSIS — R21 RASH: ICD-10-CM

## 2017-03-07 DIAGNOSIS — L30.4 INTERTRIGO: ICD-10-CM

## 2017-03-08 RX ORDER — TRIAMCINOLONE ACETONIDE 0.25 MG/G
OINTMENT TOPICAL
Qty: 80 G | Refills: 0 | OUTPATIENT
Start: 2017-03-08

## 2017-03-13 NOTE — TELEPHONE ENCOUNTER
Called Elvin to follow up on message, unable to reach her at this time. Will call her again tomorrow.    Emma Aquino RN

## 2017-03-14 ENCOUNTER — TELEPHONE (OUTPATIENT)
Dept: ONCOLOGY | Facility: CLINIC | Age: 57
End: 2017-03-14

## 2017-03-14 NOTE — TELEPHONE ENCOUNTER
PA Initiation  Insurance Company:  [employer] Filling the Rx:  [facility]  Filling Pharmacy Phone:  [phone]  Filling Pharmacy Fax:  [phone]    Tisha Arias (Huang: [id number])   [id number]  RaNITidine HCl 75MG tablets  Status: Sent to Plan via cmm  Author RONNIE  Sent: March 14th, 2017  Form: [employer] Exception to Coverage Request Form  [phone] phone  [phone]  Original Claim Info:  70 Product/Service Not Covered Plan/Benefit Exclusion OTC drugs not covered        Prior  Auth  DENIED

## 2017-03-15 ENCOUNTER — TELEPHONE (OUTPATIENT)
Dept: OTHER | Facility: CLINIC | Age: 57
End: 2017-03-15

## 2017-03-25 ENCOUNTER — MYC REFILL (OUTPATIENT)
Dept: PALLIATIVE CARE | Facility: CLINIC | Age: 57
End: 2017-03-25

## 2017-03-25 ENCOUNTER — MYC REFILL (OUTPATIENT)
Dept: FAMILY MEDICINE | Facility: CLINIC | Age: 57
End: 2017-03-25

## 2017-03-25 DIAGNOSIS — R07.89 CHEST WALL PAIN: ICD-10-CM

## 2017-03-25 DIAGNOSIS — M94.0 COSTAL CHONDRITIS: ICD-10-CM

## 2017-03-25 DIAGNOSIS — Z85.3 PERSONAL HISTORY OF MALIGNANT NEOPLASM OF BREAST: ICD-10-CM

## 2017-03-25 DIAGNOSIS — G89.29 CHRONIC MYOFASCIAL PAIN: Primary | ICD-10-CM

## 2017-03-25 DIAGNOSIS — J45.20 MILD INTERMITTENT ASTHMA WITHOUT COMPLICATION: Primary | ICD-10-CM

## 2017-03-25 DIAGNOSIS — G43.909 MIGRAINE WITHOUT STATUS MIGRAINOSUS, NOT INTRACTABLE, UNSPECIFIED MIGRAINE TYPE: ICD-10-CM

## 2017-03-25 DIAGNOSIS — I87.8 VENOUS STASIS: ICD-10-CM

## 2017-03-25 DIAGNOSIS — C50.911 MALIGNANT NEOPLASM OF RIGHT FEMALE BREAST, UNSPECIFIED SITE OF BREAST: ICD-10-CM

## 2017-03-25 DIAGNOSIS — M79.18 CHRONIC MYOFASCIAL PAIN: Primary | ICD-10-CM

## 2017-03-27 DIAGNOSIS — D89.42 IDIOPATHIC MAST CELL ACTIVATION SYNDROME (H): ICD-10-CM

## 2017-03-27 RX ORDER — ALBUTEROL SULFATE 90 UG/1
2 AEROSOL, METERED RESPIRATORY (INHALATION) EVERY 4 HOURS PRN
Qty: 18 G | Refills: 5 | Status: SHIPPED | OUTPATIENT
Start: 2017-03-27 | End: 2017-06-21

## 2017-03-27 RX ORDER — SUMATRIPTAN 50 MG/1
50 TABLET, FILM COATED ORAL
Qty: 5 TABLET | Refills: 3 | Status: SHIPPED | OUTPATIENT
Start: 2017-03-27 | End: 2017-04-27

## 2017-03-27 NOTE — TELEPHONE ENCOUNTER
Patient was last seen by PCP on 8/21/15, was last seen in primary care on 4/4/16, orders teed up please advise   Sera Mtz RN

## 2017-03-27 NOTE — TELEPHONE ENCOUNTER
Message from Mine:  Dionne Powell Mon Mar 27, 2017 7:15 AM        ----- Message -----   From: Tisha Arias   Sent: 3/25/2017 6:48 PM   To: Fz Team Purple  Subject: Medication Renewal Request     Original authorizing provider: MD Elvin DOMINGUEZ would like a refill of the following medications:  ORDER FOR DME [NAS HARMAN MD]  albuterol (VENTOLIN HFA) 108 (90 BASE) MCG/ACT inhaler [NAS HARMAN MD]  SUMAtriptan (IMITREX) 50 MG tablet [NAS HARMAN MD]    Preferred pharmacy: [facility] [address] [place] [address] [address] [facility]    Comment:  need an order for Acupuncture sent to [facility] (same order as last one) and send me a copy of the order need an order for the medical massage - mailed to me My 2 weeks in advance request for my MSO4 and Diazapem (Please mail me the scripts) Also left a voice mail message Colchicine 0.6 MG Caps - unable to take the generic because of the blue dye - message left 30 days ago increased itching and rash Need a Renewal order for the DME Compression bras and stockings    Medication renewals requested in this message routed to other providers:  diazepam (VALIUM) 5 MG tablet [Shahla Crawley MD]  COMPOUND (CMPD RX) - PHARMACY TO MIX COMPOUNDED MEDICATION [Edu Benitez MD]  morphine (MS CONTIN) 15 MG 12 hr tablet [Edu Benitez MD]  morphine (MS CONTIN) 30 MG 12 hr tablet [Edu Benitez MD]

## 2017-03-29 RX ORDER — LIDOCAINE 50 MG/G
OINTMENT TOPICAL
Qty: 35.44 G | Refills: 3 | Status: SHIPPED | OUTPATIENT
Start: 2017-03-29 | End: 2017-11-01 | Stop reason: DRUGHIGH

## 2017-03-29 RX ORDER — CROMOLYN SODIUM 40 MG/ML
SOLUTION/ DROPS OPHTHALMIC
Qty: 10 ML | Refills: 0 | Status: SHIPPED | OUTPATIENT
Start: 2017-03-29 | End: 2017-04-19

## 2017-03-29 RX ORDER — COLCHICINE 0.6 MG/1
0.6 CAPSULE ORAL 3 TIMES DAILY
Qty: 270 CAPSULE | Refills: 1 | Status: SHIPPED | OUTPATIENT
Start: 2017-03-29 | End: 2017-07-12

## 2017-03-29 RX ORDER — MORPHINE SULFATE 15 MG/1
TABLET, FILM COATED, EXTENDED RELEASE ORAL
Qty: 30 TABLET | Refills: 0 | Status: SHIPPED | OUTPATIENT
Start: 2017-04-11 | End: 2017-04-26

## 2017-03-29 RX ORDER — MORPHINE SULFATE 30 MG/1
TABLET, FILM COATED, EXTENDED RELEASE ORAL
Qty: 60 TABLET | Refills: 0 | Status: SHIPPED | OUTPATIENT
Start: 2017-04-11 | End: 2017-04-26

## 2017-03-29 NOTE — TELEPHONE ENCOUNTER
Message from [email or web address]:  Original authorizing provider: Edu Benitez MD    Elvin GARNETTJonatan Frazierlizabethjohn would like a refill of the following medications:  COMPOUND (CMPD RX) - PHARMACY TO MIX COMPOUNDED MEDICATION [Edu Benitez MD]  morphine (MS CONTIN) 15 MG 12 hr tablet [Edu Benitez MD]  morphine (MS CONTIN) 30 MG 12 hr tablet [Edu Benitez MD]    Preferred pharmacy: [facility] [address] [place] [address] 10 [address] AT SEC OF [facility] 10    Comment:  need an order for Acupuncture sent to [facility] (same order as last one) and send me a copy of the order need an order for the medical massage - mailed to me My 2 weeks in advance request for my MSO4 and Diazapem (Please mail me the scripts) Also left a voice mail message Colchicine 0.6 MG Caps - unable to take the generic because of the blue dye - message left 30 days ago increased itching and rash Need a Renewal order for the DME Compression bras and stockings    Medication renewals requested in this message routed to other providers:  diazepam (VALIUM) 5 MG tablet [Shahla Crawley MD]  ORDER FOR DME [NAS HARMAN MD]  albuterol (VENTOLIN HFA) 108 (90 BASE) MCG/ACT inhaler [NAS HARMAN MD]  SUMAtriptan (IMITREX) 50 MG tablet [NAS HARMAN MD]

## 2017-03-29 NOTE — TELEPHONE ENCOUNTER
Love      Last Written Prescription Date: 3/1/17  Last Fill Quantity: 10ml,  # refills: 0   Last Office Visit with G, P or [facility] prescribing provider: 12/20/16 with Dr. Avendaño

## 2017-03-29 NOTE — TELEPHONE ENCOUNTER
Called patient LM to call the TC line in regards to DME orders if want them mailed or .  Dionne Rich,

## 2017-03-29 NOTE — TELEPHONE ENCOUNTER
Received My [employer]bo[employer] message & VM from patient requesting refills of MS Contin & her cmpd cream. She requested several other medication refills, orders, and referrals -- diazepam script has refills on file and PCP placed referrals for massage and acupuncture, unclear if she needs additional order from Dr. Benitez. DME orders also placed by PCP.     Last refills MS Contin: 3/13/2017  Last refill cmpd cream: 1/6/2017  Last office visit: 3/1/2017  Patient noted in her VM that she is starting another new job and will not be able to schedule follow up in April due to job training. She will call back to reschedule but will require the earliest AM slot available due to work.     Will route request to MD for review. Patient would like approved scripts/orders mailed to her home address.     Reviewed [place]  Report. NOTE -- pt had small supply of hydrocodone-acetaminophen dispensed 3/24/2017 - this is from a dentist.

## 2017-03-29 NOTE — TELEPHONE ENCOUNTER
Received additional fax request from [facility] for refill of Lidocaine ointment.     Last refill: 6/6/2016

## 2017-04-04 NOTE — TELEPHONE ENCOUNTER
Called patient left a message that we did not receive a response by telephone or [employer] so going to mail DME orders to patients home address.  Dionne Rich,

## 2017-04-07 ENCOUNTER — MYC MEDICAL ADVICE (OUTPATIENT)
Dept: PALLIATIVE CARE | Facility: CLINIC | Age: 57
End: 2017-04-07

## 2017-04-19 DIAGNOSIS — D89.42 IDIOPATHIC MAST CELL ACTIVATION SYNDROME (H): ICD-10-CM

## 2017-04-19 RX ORDER — CROMOLYN SODIUM 40 MG/ML
SOLUTION/ DROPS OPHTHALMIC
Qty: 10 ML | Refills: 0 | Status: SHIPPED | OUTPATIENT
Start: 2017-04-19 | End: 2017-05-08

## 2017-04-19 NOTE — TELEPHONE ENCOUNTER
Cromolyn 100 mg/5 ml      Last Written Prescription Date: 10/25/16  Last Fill Quantity: 1440 ml,  # refills: 3   Last Office Visit with [facility], [facility] or [facility] prescribing provider: 12/20/16

## 2017-04-25 DIAGNOSIS — R11.2 NAUSEA WITH VOMITING: ICD-10-CM

## 2017-04-25 NOTE — TELEPHONE ENCOUNTER
Received fax from pharmacy requesting refill of ondansetron.     Last refill: 8/3/2016  Last office visit: 3/1/2017  Scheduled for follow up 4/26/2017     Will route request to MD for review.

## 2017-04-26 ENCOUNTER — OFFICE VISIT (OUTPATIENT)
Dept: PALLIATIVE CARE | Facility: CLINIC | Age: 57
End: 2017-04-26
Attending: INTERNAL MEDICINE
Payer: COMMERCIAL

## 2017-04-26 VITALS
TEMPERATURE: 97 F | HEART RATE: 103 BPM | RESPIRATION RATE: 18 BRPM | DIASTOLIC BLOOD PRESSURE: 87 MMHG | SYSTOLIC BLOOD PRESSURE: 156 MMHG | OXYGEN SATURATION: 99 % | HEIGHT: 65 IN

## 2017-04-26 DIAGNOSIS — E89.2 POSTSURGICAL HYPOPARATHYROIDISM (H): ICD-10-CM

## 2017-04-26 DIAGNOSIS — G43.719 INTRACTABLE CHRONIC MIGRAINE WITHOUT AURA AND WITHOUT STATUS MIGRAINOSUS: ICD-10-CM

## 2017-04-26 DIAGNOSIS — C73 PAPILLARY CARCINOMA, FOLLICULAR VARIANT (H): ICD-10-CM

## 2017-04-26 DIAGNOSIS — E89.2 POSTSURGICAL HYPOPARATHYROIDISM (H): Primary | ICD-10-CM

## 2017-04-26 DIAGNOSIS — G43.909 MIGRAINE WITHOUT STATUS MIGRAINOSUS, NOT INTRACTABLE, UNSPECIFIED MIGRAINE TYPE: ICD-10-CM

## 2017-04-26 DIAGNOSIS — R07.89 CHEST WALL PAIN: Primary | ICD-10-CM

## 2017-04-26 DIAGNOSIS — M79.18 MYOFASCIAL PAIN: ICD-10-CM

## 2017-04-26 DIAGNOSIS — E89.0 POSTSURGICAL HYPOTHYROIDISM: ICD-10-CM

## 2017-04-26 LAB
CALCIUM SERPL-MCNC: 8.3 MG/DL (ref 8.5–10.1)
CREAT SERPL-MCNC: 0.83 MG/DL (ref 0.52–1.04)
GFR SERPL CREATININE-BSD FRML MDRD: 71 ML/MIN/1.7M2
PHOSPHATE SERPL-MCNC: 4.1 MG/DL (ref 2.5–4.5)
T4 FREE SERPL-MCNC: 1.08 NG/DL (ref 0.76–1.46)
TSH SERPL DL<=0.05 MIU/L-ACNC: 0.5 MU/L (ref 0.4–4)

## 2017-04-26 PROCEDURE — 99212 OFFICE O/P EST SF 10 MIN: CPT | Mod: ZF

## 2017-04-26 PROCEDURE — 99214 OFFICE O/P EST MOD 30 MIN: CPT | Mod: ZP | Performed by: INTERNAL MEDICINE

## 2017-04-26 RX ORDER — ONDANSETRON 4 MG/1
4-8 TABLET, ORALLY DISINTEGRATING ORAL EVERY 8 HOURS PRN
Qty: 60 TABLET | Refills: 6 | Status: SHIPPED | OUTPATIENT
Start: 2017-04-26 | End: 2017-10-04

## 2017-04-26 RX ORDER — DIAZEPAM 5 MG
5 TABLET ORAL 3 TIMES DAILY PRN
Qty: 90 TABLET | Refills: 2 | Status: SHIPPED | OUTPATIENT
Start: 2017-04-26 | End: 2017-07-12

## 2017-04-26 RX ORDER — MORPHINE SULFATE 30 MG/1
TABLET, FILM COATED, EXTENDED RELEASE ORAL
Qty: 60 TABLET | Refills: 0 | Status: SHIPPED | OUTPATIENT
Start: 2017-04-26 | End: 2017-05-18

## 2017-04-26 RX ORDER — METHOCARBAMOL 750 MG/1
750 TABLET, FILM COATED ORAL 4 TIMES DAILY PRN
Qty: 360 TABLET | Refills: 1 | COMMUNITY
Start: 2017-04-26 | End: 2017-07-12

## 2017-04-26 RX ORDER — MORPHINE SULFATE 15 MG/1
TABLET, FILM COATED, EXTENDED RELEASE ORAL
Qty: 30 TABLET | Refills: 0 | Status: SHIPPED | OUTPATIENT
Start: 2017-04-26 | End: 2017-05-18

## 2017-04-26 ASSESSMENT — PAIN SCALES - GENERAL: PAINLEVEL: NO PAIN (0)

## 2017-04-26 NOTE — NURSING NOTE
"Tisha Arias is a 56 year old female who presents for:  Chief Complaint   Patient presents with     Oncology Clinic Visit     routine follow up for palliative care         Initial Vitals:  /87 (BP Location: Left arm, Patient Position: Chair, Cuff Size: Adult Regular)  Pulse 103  Temp 97  F (36.1  C) (Oral)  Resp 18  Ht 1.651 m (5' 5\")  SpO2 99% Estimated body mass index is 33.55 kg/(m^2) as calculated from the following:    Height as of 7/12/16: 1.651 m (5' 5\").    Weight as of 7/12/16: 91.4 kg (201 lb 9.6 oz).. There is no height or weight on file to calculate BSA. BP completed using cuff size: regular  No Pain (0) No LMP recorded. Patient is postmenopausal. Allergies and medications reviewed.     Medications: MEDICATION REFILLS NEEDED TODAY.  Pharmacy name entered into [facility]:    [facility] [address] [place] [address] - [address] [place] [address] [address] [facility] [address] [place] [address] PHARMACY SERVICES - [place], [place] - [facility] [address], [address]    Comments: Zofran, lorazepam,morphine and Imatrex.   Pt declined weight     6 minutes for nursing intake (face to face time)   Nicci Grover CMA        "

## 2017-04-26 NOTE — PROGRESS NOTES
Palliative Staff/Outpatient Clinic    CC:  Patient ID: chronic complex myofascial chest wall pain after breast cancer surgery and reconstruction   Saw Dr England (at my insistence) earlier in 2016 for a 2nd opinion   Mood disturbance in this context   Follows closely also with acupuncture, massage, psychotherapy.   Is diagnosed also with MCAS and follows with Dr Avendaño  Thyroid cancer s/p resection, ablations  Interim Hx:  Overall, things are stable for Ms. Arias.  The last time I saw her we tried baclofen but it gave her very severe headaches and she stopped it.  She continues on Celebrex, diclofenac, MS Contin, Robaxin, colchicine, topical meds, massage, acupuncture and psychotherapy.  She found out the colchicine had blue dye in it and she is concerned that is causing her headaches and in retrospect maybe that is why her headaches have been so bad the last year.  She is taking Imitrex several times a day and then runs out and does not really know what to do.  She had an episode which may have been status migranosis in January and I told her to go to the emergency room and she did not.  She has a new massage therapist and is doing scar release with her breast surgery site.  The worst of her pain is the tightness/constriction feeling she has in her chest, which has been longstanding.  She describes feeling sick of it and wanting it to go away.      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  She feels her performance is excellent there and she is at the moment quite satisfied with how that is going.  Her mood and affect seem brighter today and she relates this to her job.      SOCIAL HISTORY:   Reviewed and unchanged.      REVIEW OF SYSTEMS:   Fatigue and tired.      PHYSICAL EXAMINATION:   VITAL SIGNS:   Noted.   Alert, pleasant woman in no acute distress.  Well groomed.  Clear sensorium,  fluent speech, normal memory and thought processes.  Chest wall is very tender and tight and she guards it.  Neuropsych exam is normal.       IMPRESSION AND RECOMMENDATIONS:   56-year-old woman with complex chronic chest wall pain after mastectomy.  I encouraged her to continue her current regimen.  Continue scar release massage.  She is having intractable headaches and I recommended she see a specialist about that - I certainly do not know what to do.  She is working at [employer] now and I think seeing the [facility] would be a great idea for her and I put in a referral today.  On her really bad days, it is okay for her to take 5 Robaxin.  Talked about medication safety and side effects.  Followup in 3 months.      Edu Benitez MD       Chart data reviewed today:    Family History   Problem Relation Age of Onset     Allergies Mother      Arthritis Mother      CANCER Mother      uterine/uterine     Hypertension Mother      on HCTZ     Hyperlipidemia Mother      CEREBROVASCULAR DISEASE Mother      s/p brain surgery     Breast Cancer Mother      Depression Mother      Anxiety Disorder Mother      Anesthesia Reaction Mother      Asthma Mother      Skin Cancer Mother      HEART DISEASE Father      DIABETES Father      Coronary Artery Disease Father      Hypertension Father      Hyperlipidemia Father      CEREBROVASCULAR DISEASE Father      Multiple strokes     Obesity Father      DIABETES Brother      Depression Brother      Hypertension Brother      CANCER Brother 57     pancreatic cancer     CANCER Maternal Grandfather      pancreatic cancer/stomach cancer     Prostate Cancer Maternal Grandfather      Prostrate and Bladder     Other Cancer Maternal Grandfather      Pancreatic 1988, Bladder 1977     CANCER Maternal Grandmother      uterine     Breast Cancer Maternal Grandmother      Skin Cancer Maternal Grandmother      DIABETES Brother      Breast Cancer Cousin      Grand mothers sister      Other Cancer Brother      Stage 3 Pancreatic 2-     Depression Brother      Asthma Brother      Obesity Brother      Anesthesia Reaction Other      H/a, itching, drug interactions     Asthma Other      Thyroid Disease No family hx of      CANCER Brother      Pancreatic      Past Medical History:   Diagnosis Date     Allergic rhinitis due to animal dander      Asthma      Breast cancer (H) 12    right     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 No real need for Epipen for this alone.     GDM (gestational diabetes mellitus)     w first pregnancy only     Gestational hypertension      Lymphedema     jackson arms, chest     Migraine      Neuropathy associated with cancer (H)      Papillary carcinoma, follicular variant (H) 2013    pT3, N1, Mx, thyroid     Post-surgical hypothyroidism      Renal disease     kidney stones     Rhinitis, allergic to other allergen      Right Breast mass and microcalcifications 2011     Seasonal allergic conjunctivitis      Seasonal allergic rhinitis 11 skin tests pos. for: dog/M/T/G/W--NEGATIVE FOOD TESTS FOR: shrimp, crab, lobster, coconut     Past Surgical History:   Procedure Laterality Date     BACK SURGERY  ,    Bulging disc w nerve root impigment     BIOPSY BREAST      right     BIOPSY BREAST  11    right, core and sterotactic     BYPASS GASTRIC, CHOLECYSTECTOMY, COMBINED       C LAPAROSCOPIC GASTRIC RESTRICTIVE PX, W/GASTRIC BYPASS/ GAMALIEL-EN-Y, < 150CM      Gamaliel en Y?      SECTION       COLONOSCOPY      normal     COSMETIC SURGERY  2012    Final Stage of Mastectomy     DAVINCI HYSTERECTOMY TOTAL, BILATERAL SALPINGO-OOPHORECTOMY, COMBINED  2012    Procedure: COMBINED DAVINCI HYSTERECTOMY TOTAL, SALPINGO-OOPHORECTOMY;  Davinci Total  Laparoscopic Hysterectomy, Bilateral Salpingo Oophorectomy, Pelvic Washings, Cystoscopy;  Surgeon: Evie Sheikh MD;  Location: [facility] OR     ENT SURGERY  1982     GI SURGERY  11-    Rachael-en Y w cholecystectomy     GYN SURGERY  1/6/89,1/10/92    2 c-sections     HYSTERECTOMY, PAP NO LONGER INDICATED  12/12    DeVinci assister lap hyst with BSO     IRRIGATION AND DEBRIDEMENT BREAST  3/1/2012    Procedure:IRRIGATION AND DEBRIDEMENT BREAST; Irrigation and Debridement, Wound Closure Right Breast; Surgeon:JUNG GUILLEN; Location:[facility] OR     MASTECTOMY, RECONSTRUCT BREAST, COMBINED  1/30/2012    Procedure:COMBINED MASTECTOMY, RECONSTRUCT BREAST; Bilateral Mastectomies, Right Axillary [place] Node Biopsy, Bilateral Breast Reconstruction with Tissue Expanders, Reconstruction of inframammary fold, bilateral pain management systems; Surgeon:ANUPAM CAMPBELL; Location:[facility] OR     RECONSTRUCT BREAST  8/31/2012    Procedure: RECONSTRUCT BREAST;  Bilateral 2nd stage breast reconstruction, revision,       SOFT TISSUE SURGERY  1-30-12    Mastectomy w severe myofascial pain syndrome     THYROIDECTOMY  7/10/2013    Procedure: THYROIDECTOMY;  Total Thyroidectomy with central neck dissection;  Surgeon: Indiana Sneed MD;  Location: [facility] OR     TONSILLECTOMY      childhood     Allergies   Allergen Reactions     No Clinical Screening - See Comments Shortness Of Breath, Palpitations, Anaphylaxis, Itching, Swelling, Difficulty breathing and Rash     Sukhjinder wipes- oral allergy -  July 2015: throat tightness from a Chinese herbal medicine Wilmer Tran     Amitriptyline Hcl Swelling     Baclofen      Birch Trees      Potatoes, carrots, cherries, celery, apple, pears, plums, peaches, parsnip, kiwi, hazelnuts, and apricots,      Cymbalta Other (See Comments)     Flushing, tremor/muscle twitching and edema     Gabapentin      edema     Grass      Mugwort [Artemisia Vulgaris]      Various spices     Nortriptyline Itching,  Visual Disturbance, Swelling and GI Disturbance     Ragweeds      Melons, bananas, cucumbers, zucchini.     Topamax      Serotonin Anxiety and Swelling          Medications:   I have reviewed this patient's medication profile.  MNPMP: reviewed      Data reviewed:  I reviewed recent labs and imaging, comments on pertinent findings:  TSH 1.2  Neck u/s recently-no signs of recurrence    Thank you for involving us in the patient's care. 25' face time over half counseling  Edu Benitez MD / Palliative Medicine / Pager [phone] / [facility] Inpatient Team Consult Pager [phone] (answered 8am-430pm M-F) - ok to text page via [employer] / After-Hours Answering Service [phone] / Palliative Clinic in the [facility] at the [facility] - [phone] (scheduling); [phone] (triage).

## 2017-04-26 NOTE — LETTER
4/26/2017       RE: Tisha Arias  [address] [place] [address]     Dear Colleague,    Thank you for referring your patient, Tisha Arias, to the [facility] at [facility]. Please see a copy of my visit note below.    Palliative Staff/Outpatient Clinic    CC:  Patient ID: chronic complex myofascial chest wall pain after breast cancer surgery and reconstruction   Saw Dr England (at my insistence) earlier in 2016 for a 2nd opinion   Mood disturbance in this context   Follows closely also with acupuncture, massage, psychotherapy.   Is diagnosed also with MCAS and follows with Dr Avendaño  Thyroid cancer s/p resection, ablations  Interim Hx:  Overall, things are stable for Ms. Arias.  The last time I saw her we tried baclofen but it gave her very severe headaches and she stopped it.  She continues on Celebrex, diclofenac, MS Contin, Robaxin, colchicine, topical meds, massage, acupuncture and psychotherapy.  She found out the colchicine had blue dye in it and she is concerned that is causing her headaches and in retrospect maybe that is why her headaches have been so bad the last year.  She is taking Imitrex several times a day and then runs out and does not really know what to do.  She had an episode which may have been status migranosis in January and I told her to go to the emergency room and she did not.  She has a new massage therapist and is doing scar release with her breast surgery site.  The worst of her pain is the tightness/constriction feeling she has in her chest, which has been longstanding.  She describes feeling sick of it and wanting it to go away.      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  She feels her performance is excellent there and she is at the moment quite satisfied with how that is going.   Her mood and affect seem brighter today and she relates this to her job.      SOCIAL HISTORY:   Reviewed and unchanged.      REVIEW OF SYSTEMS:   Fatigue and tired.      PHYSICAL EXAMINATION:   VITAL SIGNS:   Noted.   Alert, pleasant woman in no acute distress.  Well groomed.  Clear sensorium, fluent speech, normal memory and thought processes.  Chest wall is very tender and tight and she guards it.  Neuropsych exam is normal.       IMPRESSION AND RECOMMENDATIONS:   56-year-old woman with complex chronic chest wall pain after mastectomy.  I encouraged her to continue her current regimen.  Continue scar release massage.  She is having intractable headaches and I recommended she see a specialist about that - I certainly do not know what to do.  She is working at [employer] now and I think seeing the [facility] would be a great idea for her and I put in a referral today.  On her really bad days, it is okay for her to take 5 Robaxin.  Talked about medication safety and side effects.  Followup in 3 months.      Edu Benitez MD       Chart data reviewed today:    Family History   Problem Relation Age of Onset     Allergies Mother      Arthritis Mother      CANCER Mother      uterine/uterine     Hypertension Mother      on HCTZ     Hyperlipidemia Mother      CEREBROVASCULAR DISEASE Mother      s/p brain surgery     Breast Cancer Mother      Depression Mother      Anxiety Disorder Mother      Anesthesia Reaction Mother      Asthma Mother      Skin Cancer Mother      HEART DISEASE Father      DIABETES Father      Coronary Artery Disease Father      Hypertension Father      Hyperlipidemia Father      CEREBROVASCULAR DISEASE Father      Multiple strokes     Obesity Father      DIABETES Brother      Depression Brother      Hypertension Brother      CANCER Brother 57     pancreatic cancer     CANCER Maternal Grandfather      pancreatic cancer/stomach cancer     Prostate Cancer Maternal Grandfather       Prostrate and Bladder     Other Cancer Maternal Grandfather      Pancreatic 1988, Bladder 1977     CANCER Maternal Grandmother      uterine     Breast Cancer Maternal Grandmother      Skin Cancer Maternal Grandmother      DIABETES Brother      Breast Cancer Cousin      Grand mothers sister     Other Cancer Brother      Stage 3 Pancreatic 2-2015     Depression Brother      Asthma Brother      Obesity Brother      Anesthesia Reaction Other      H/a, itching, drug interactions     Asthma Other      Thyroid Disease No family hx of      CANCER Brother      Pancreatic      Past Medical History:   Diagnosis Date     Allergic rhinitis due to animal dander      Asthma      Breast cancer (H) 1/30/12    right     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 No real need for Epipen for this alone.     GDM (gestational diabetes mellitus)     w first pregnancy only     Gestational hypertension      Lymphedema     jackson arms, chest     Migraine      Neuropathy associated with cancer (H)      Papillary carcinoma, follicular variant (H) 6/28/2013    pT3, N1, Mx, thyroid     Post-surgical hypothyroidism      Renal disease     kidney stones     Rhinitis, allergic to other allergen      Right Breast mass and microcalcifications 12/13/2011     Seasonal allergic conjunctivitis      Seasonal allergic rhinitis 4/12/11 skin tests pos. for: dog/M/T/G/W--NEGATIVE FOOD TESTS FOR: shrimp, crab, lobster, coconut     Past Surgical History:   Procedure Laterality Date     BACK SURGERY  2000,    Bulging disc w nerve root impigment     BIOPSY BREAST  1989    right     BIOPSY BREAST  12/13/11    right, core and sterotactic     BYPASS GASTRIC, CHOLECYSTECTOMY, COMBINED  2007     C LAPAROSCOPIC GASTRIC RESTRICTIVE PX, W/GASTRIC BYPASS/ GAMALIEL-EN-Y, < 150CM  2007    Gamaliel en Y?       SECTION       COLONOSCOPY      normal     COSMETIC SURGERY  2012    Final Stage of Mastectomy     DAVINCI HYSTERECTOMY TOTAL, BILATERAL SALPINGO-OOPHORECTOMY, COMBINED  2012    Procedure: COMBINED DAVINCI HYSTERECTOMY TOTAL, SALPINGO-OOPHORECTOMY;  Davinci Total Laparoscopic Hysterectomy, Bilateral Salpingo Oophorectomy, Pelvic Washings, Cystoscopy;  Surgeon: Evie Sheikh MD;  Location: [facility] OR     ENT SURGERY  1982     GI SURGERY  2007    Rachael-en Y w cholecystectomy     GYN SURGERY  89,1/10/92    2 c-sections     HYSTERECTOMY, PAP NO LONGER INDICATED      DeVinci assister lap hyst with BSO     IRRIGATION AND DEBRIDEMENT BREAST  3/1/2012    Procedure:IRRIGATION AND DEBRIDEMENT BREAST; Irrigation and Debridement, Wound Closure Right Breast; Surgeon:JUNG GUILLEN; Location:[facility] OR     MASTECTOMY, RECONSTRUCT BREAST, COMBINED  2012    Procedure:COMBINED MASTECTOMY, RECONSTRUCT BREAST; Bilateral Mastectomies, Right Axillary [place] Node Biopsy, Bilateral Breast Reconstruction with Tissue Expanders, Reconstruction of inframammary fold, bilateral pain management systems; Surgeon:ANUPAM CAMPBELL; Location:[facility] OR     RECONSTRUCT BREAST  2012    Procedure: RECONSTRUCT BREAST;  Bilateral 2nd stage breast reconstruction, revision,       SOFT TISSUE SURGERY  12    Mastectomy w severe myofascial pain syndrome     THYROIDECTOMY  7/10/2013    Procedure: THYROIDECTOMY;  Total Thyroidectomy with central neck dissection;  Surgeon: Indiana Sneed MD;  Location: [facility] OR     TONSILLECTOMY      childhood     Allergies   Allergen Reactions     No Clinical Screening - See Comments Shortness Of Breath, Palpitations, Anaphylaxis, Itching, Swelling, Difficulty breathing and Rash     Sukhjinder wipes- oral allergy -  2015: throat tightness from a Chinese herbal medicine Wilmer Tran     Amitriptyline Hcl Swelling     Baclofen      Birch Trees      Potatoes, carrots,  cherries, celery, apple, pears, plums, peaches, parsnip, kiwi, hazelnuts, and apricots,      Cymbalta Other (See Comments)     Flushing, tremor/muscle twitching and edema     Gabapentin      edema     Grass      Mugwort [Artemisia Vulgaris]      Various spices     Nortriptyline Itching, Visual Disturbance, Swelling and GI Disturbance     Ragweeds      Melons, bananas, cucumbers, zucchini.     Topamax      Serotonin Anxiety and Swelling          Medications:   I have reviewed this patient's medication profile.  MNPMP: reviewed      Data reviewed:  I reviewed recent labs and imaging, comments on pertinent findings:  TSH 1.2  Neck u/s recently-no signs of recurrence    Thank you for involving us in the patient's care. 25' face time over half counseling  Edu Benitez MD / Palliative Medicine / Pager [phone] / [facility] Inpatient Team Consult Pager [phone] (answered 8am-430pm M-F) - ok to text page via [employer] / After-Hours Answering Service [phone] / Palliative Clinic in the [facility] at the [facility] - [phone] (scheduling); [phone] (triage).

## 2017-04-26 NOTE — MR AVS SNAPSHOT
After Visit Summary   4/26/2017    Tisha Arias    MRN: [id number]           Patient Information     Date Of Birth          1960        Visit Information        Provider Department      4/26/2017 7:00 AM Edu Benitez MD [facility]        Today's Diagnoses     Chest wall pain    -  1    Intractable chronic migraine without aura and without status migrainosus        Myofascial pain           Follow-ups after your visit        Additional Services     NEUROLOGY ADULT REFERRAL       Your provider has referred you to: N: [facility] - Autumn Swenson    Reason for Referral: Consult    Please be aware that coverage of these services is subject to the terms and limitations of your health insurance plan.  Call member services at your health plan with any benefit or coverage questions.      Please bring the following with you to your appointment:    (1) Any X-Rays, CTs or MRIs which have been performed.  Contact the facility where they were done to arrange for  prior to your scheduled appointment.    (2) List of current medications  (3) This referral request   (4) Any documents/labs given to you for this referral                  Follow-up notes from your care team     Return in about 3 months (around 7/26/2017).      Your next 10 appointments already scheduled     May 02, 2017  3:30 PM CDT   LAB with  LAB   [facility] ([facility])    [address] [place] [id number] [phone]           Patient must bring picture ID.  Patient should be prepared to give a urine specimen  Please do not eat 10-12 hours before your appointment if you are coming in fasting for labs on lipids, cholesterol, or glucose (sugar).  Pregnant women should follow their Care Team instructions. Water with medications is okay. Do not drink coffee or other fluids.   If you have concerns about taking  your medications,  please ask at office or if scheduling via [employer], send a message by clicking on Secure Messaging, Message Your Care Team.            May 02, 2017  4:00 PM CDT   (Arrive by 3:45 PM)   RETURN ENDOCRINE with Avelina Castro MD   [facility] ([facility])    [address] [place] [address]  3rd [facility] [address]   [phone]            Jul 12, 2017  7:00 AM CDT   (Arrive by 6:45 AM)   Return Visit with Edu Benitez MD   [facility] ([facility])    [address] [place] [id number]   [phone]            Jul 24, 2017  4:30 PM CDT   (Arrive by 4:15 PM)   Return Visit with Shahla Crawley MD   [facility] ([facility])    [address] [place] [id number] [phone]              Who to contact     If you have questions or need follow up information about today's clinic visit or your schedule please contact [facility] directly at [phone].  Normal or non-critical lab and imaging results will be communicated to you by MyChart, letter or phone within 4 business days after the clinic has received the results. If you do not hear from us within 7 days, please contact the clinic through [employer] or phone. If you have a critical or abnormal lab result, we will notify you by phone as soon as possible.  Submit refill requests through [employer] or call your pharmacy and they will forward the refill request to us. Please allow 3 business days for your refill to be completed.          Additional Information About Your Visit        MediaCore Information     [employer] gives you secure access to your electronic health record. If you see a primary care provider, you can also send messages to your care team and make appointments. If you have questions, please call your primary care clinic.  If you do not have a primary care  "provider, please call [phone] and they will assist you.        Care EveryWhere ID     This is your Care EveryWhere ID. This could be used by other organizations to access your [facility] medical records  [id number]        Your Vitals Were     Pulse Temperature Respirations Height Pulse Oximetry       103 97  F (36.1  C) (Oral) 18 1.651 m (5' 5\") 99%        Blood Pressure from Last 3 Encounters:   04/26/17 156/87   01/13/17 149/85   01/06/17 166/85    Weight from Last 3 Encounters:   07/12/16 91.4 kg (201 lb 9.6 oz)   06/17/16 90.3 kg (199 lb)   06/17/16 90.3 kg (199 lb)              We Performed the Following     NEUROLOGY ADULT REFERRAL          Today's Medication Changes          These changes are accurate as of: 4/26/17  7:51 AM.  If you have any questions, ask your nurse or doctor.               These medicines have changed or have updated prescriptions.        Dose/Directions    methocarbamol 750 MG tablet   Commonly known as:  ROBAXIN   This may have changed:  additional instructions   Used for:  Myofascial pain        Dose:  750 mg   Take 1 tablet (750 mg) by mouth 4 times daily as needed . Ok to take a 5th Robaxin on very severe days.   Quantity:  360 tablet   Refills:  1         Stop taking these medicines if you haven't already. Please contact your care team if you have questions.     baclofen 10 MG tablet   Commonly known as:  LIORESAL           cromolyn 100 MG/5ML (HIGH CONC) solution   Commonly known as:  GASTROCROM                Where to get your medicines      Some of these will need a paper prescription and others can be bought over the counter.  Ask your nurse if you have questions.     Bring a paper prescription for each of these medications     diazepam 5 MG tablet    morphine 15 MG 12 hr tablet    morphine 30 MG 12 hr tablet                Primary Care Provider Office Phone # Fax #    Chandni Orona [phone]       [address] " [place] [address]        Thank you!     Thank you for choosing [facility]  for your care. Our goal is always to provide you with excellent care. Hearing back from our patients is one way we can continue to improve our services. Please take a few minutes to complete the written survey that you may receive in the mail after your visit with us. Thank you!             Your Updated Medication List - Protect others around you: Learn how to safely use, store and throw away your medicines at www.disposemymeds.org.          This list is accurate as of: 4/26/17  7:51 AM.  Always use your most recent med list.                   Brand Name Dispense Instructions for use    ACAI PO      Take 1,000 mg by mouth 2 times daily       albuterol 108 (90 BASE) MCG/ACT Inhaler    VENTOLIN HFA    18 g    Inhale 2 puffs into the lungs every 4 hours as needed for shortness of breath / dyspnea or wheezing       aluminum chloride 20 % external solution    DRYSOL    60 mL    Apply topically At Bedtime       AZMACORT IN      Inhale 2 puffs into the lungs as needed       BENADRYL PO      Take 50 mg by mouth as needed       BETAINE HCL PO      Take 2 tablets by mouth as needed Betaine HCL and Pepsin: Take 1-7 tabs by mouth daily, if burning take one tablet less once daily. Betaine HCL 1.04 g Pepsin 40 mg       calcitRIOL 0.25 MCG capsule    ROCALTROL    270 capsule    2 tabs in am and 1 tab qhs       CALCIUM CITRATE +D 315-250 MG-UNIT Tabs per tablet   Generic drug:  calcium citrate-vitamin D     120 tablet    Take 2 tablets by mouth 3 times daily       celecoxib 200 MG capsule    celeBREX    180 capsule    Take 1 capsule (200 mg) by mouth 2 times daily       Colchicine 0.6 MG Caps     270 capsule    Take 0.6 mg by mouth 3 times daily for costochondritis (chest) discomfort. Scale back use to prn with loose stools or bloating. Dispense Colcrys. Patient cannot use generic due to allergy to dye.       COMPOUND - PHARMACY  TO MIX COMPOUNDED MEDICATION    CMPD RX    120 g    Apply small amount to affected area two times daily.Ketamine 6% + ketoprofen 10% + lidocaine 10% in Lipo.       cromolyn 4 % ophthalmic solution    OPTICROM    10 mL    INSTILL ONE DROP INTO BOTH EYES FOUR TIMES DAILY       cromolyn sodium 5.2 MG/ACT Aers Inhaler    [facility] CROMOLYN SODIUM    1 Bottle    Spray 1 spray (1 mL) in nostril daily       cyclobenzaprine 10 MG tablet    FLEXERIL     Take 10 mg by mouth 3 times daily as needed On hold Dr Monsalve       diazepam 5 MG tablet    VALIUM    90 tablet    Take 1 tablet (5 mg) by mouth 3 times daily as needed For muscle spasms       diclofenac 1 % Gel topical gel    VOLTAREN    100 g    Apply affected area two times daily PRN using enclosed dosing card.       docusate sodium 100 MG tablet    COLACE    60 tablet    Take 100 mg by mouth daily       EPINEPHrine 0.3 MG/0.3ML injection    EPIPEN 2-CARIDAD    2 each    Inject 0.3 mLs (0.3 mg) into the muscle once as needed for anaphylaxis       hydrochlorothiazide 12.5 MG capsule    MICROZIDE    90 capsule    Take 1 capsule (12.5 mg) by mouth daily       levothyroxine 112 MCG tablet    SYNTHROID/LEVOTHROID    189 tablet    Monday-Saturday: (2 tablet/day);  Sunday: (2.5 tablet)       lidocaine 5 % ointment    XYLOCAINE    35.44 g    SANJANA TOPICALLY QID PRN       Lidocaine HCl Monohydrate Powd          MAGNESIUM CITRATE PO      Take 400 mg by mouth daily       methocarbamol 750 MG tablet    ROBAXIN    360 tablet    Take 1 tablet (750 mg) by mouth 4 times daily as needed . Ok to take a 5th Robaxin on very severe days.       mometasone 110 MCG/INH inhaler    ASMANEX 30 METERED DOSES    3 Inhaler    Inhale 1 puff into the lungs daily       montelukast 10 MG tablet    SINGULAIR    120 tablet    TAKE TWO TABLETS BY MOUTH TWICE DAILY       * morphine 15 MG 12 hr tablet    MS CONTIN    30 tablet    1 tab (15mg) at bedtime.       * morphine 30 MG 12 hr tablet    MS CONTIN    60 tablet    1  tab (30mg) in the morning and in the afternoon + 15mg at bedtime       OMEGA 3 PO      Take 5 mLs by mouth       ondansetron 4 MG ODT tab    ZOFRAN ODT    60 tablet    Take 1-2 tablets (4-8 mg) by mouth every 8 hours as needed for nausea or vomiting       * order for DME     2 Units    Equipment being ordered: compression stockings. 20-30 mm or 30 - 40 mm as patient can tolerate. Physical therapy to determine if they should be above or below the knee.       * order for DME     2 Device    Equipment being ordered: compression bra       polyethylene glycol powder    MIRALAX    510 g    Take 17 g by mouth daily       potassium chloride SA 20 MEQ CR tablet    potassium chloride    90 tablet    Take 1 tablet (20 mEq) by mouth daily       PROBIOTIC DAILY PO      Take 1 capsule by mouth daily Lacto acid bifidobacterium       ranitidine 75 MG tablet    ZANTAC    270 tablet    Take 1 tablet (75 mg) by mouth 3 times daily       SUMAtriptan 50 MG tablet    IMITREX    5 tablet    Take 1 tablet (50 mg) by mouth at onset of headache for migraine (may repeat in 2 hours as needed, max 2 tablets daily)       tacrolimus 0.1 % ointment    PROTOPIC    60 g    Apply topically 2 times daily       tamoxifen 20 MG tablet    NOLVADEX    90 tablet    Take 1 tablet (20 mg) by mouth daily       triamcinolone 0.025 % ointment    KENALOG    80 g    Apply topically 2 times daily Mix with 1 lb jar of vaseline or aquaphor       TUMS 500 MG chewable tablet   Generic drug:  calcium carbonate     180 chew tab    Take 2 tablets (1,000 mg) by mouth nightly as needed for other (cramps)       * UNABLE TO FIND      1 tablet 3 times daily MEDICATION NAME: calcium D-Glucarate       * UNABLE TO FIND      2 tablets 3 times daily MEDICATION NAME: Natural D-Hist       * UNABLE TO FIND      MEDICATION NAME: Tumeric 3 capsules daily       * UNABLE TO FIND      1 tablet 3 times daily MEDICATION NAME: Digestzymes       * UNABLE TO FIND      1 tablet daily MEDICATION  NAME: Pure Encapsulations       vitamin D3 2000 UNITS Caps     60 capsule    Take 6,000 Units by mouth daily       ZYRTEC ALLERGY 10 MG tablet   Generic drug:  cetirizine     90 tablet    Take 1 tablet (10 mg) by mouth 3 times daily On hold for lab test.       * Notice:  This list has 9 medication(s) that are the same as other medications prescribed for you. Read the directions carefully, and ask your doctor or other care provider to review them with you.

## 2017-04-26 NOTE — TELEPHONE ENCOUNTER
Reason for Call:  Medication or medication refill:    Do you use a [facility]?  No    Name of the pharmacy and phone number for the current request: [facility] [address] [place] [address] [address] AT SEC OF [facility] [address]     Name of the medication requested: SUMAtriptan    Other request: No    Can we leave a detailed message on this number? YES    Phone number patient can be reached at: Cell number on file:    Telephone Information:   Mobile [phone]       Best Time: anytime    Call taken on 4/26/2017 at 5:19 PM by Mary De Luna

## 2017-04-27 RX ORDER — SUMATRIPTAN 50 MG/1
50 TABLET, FILM COATED ORAL
Qty: 5 TABLET | Refills: 3 | Status: SHIPPED | OUTPATIENT
Start: 2017-04-27 | End: 2017-07-26

## 2017-04-27 NOTE — TELEPHONE ENCOUNTER
Prescription approved per [facility] Refill Protocol.  Mary Wilkerson, RN - BC

## 2017-04-27 NOTE — TELEPHONE ENCOUNTER
SUMAtriptan (IMITREX) 50 MG tablet      Last Written Prescription Date: 03/27/2017  Last Fill Quantity: 5, # refills: 3  Last Office Visit with MARIVEL, AVANI or [facility] prescribing provider: 04/04/2016       BP Readings from Last 3 Encounters:   04/26/17 156/87   01/13/17 149/85   01/06/17 166/85     Daniella Tello MA

## 2017-05-02 ENCOUNTER — OFFICE VISIT (OUTPATIENT)
Dept: ENDOCRINOLOGY | Facility: CLINIC | Age: 57
End: 2017-05-02

## 2017-05-02 VITALS — SYSTOLIC BLOOD PRESSURE: 103 MMHG | HEART RATE: 85 BPM | DIASTOLIC BLOOD PRESSURE: 70 MMHG | HEIGHT: 65 IN

## 2017-05-02 DIAGNOSIS — E89.2 POSTSURGICAL HYPOPARATHYROIDISM (H): ICD-10-CM

## 2017-05-02 DIAGNOSIS — C73 PAPILLARY CARCINOMA, FOLLICULAR VARIANT (H): ICD-10-CM

## 2017-05-02 DIAGNOSIS — E89.0 POSTSURGICAL HYPOTHYROIDISM: ICD-10-CM

## 2017-05-02 DIAGNOSIS — C73 THYROID CANCER (H): ICD-10-CM

## 2017-05-02 LAB — LAB SCANNED RESULT: NORMAL

## 2017-05-02 RX ORDER — ACETAMINOPHEN 160 MG
5000 TABLET,DISINTEGRATING ORAL DAILY
Qty: 60 CAPSULE | Refills: 4 | Status: ON HOLD | COMMUNITY
Start: 2017-05-02 | End: 2018-01-22

## 2017-05-02 RX ORDER — LEVOTHYROXINE SODIUM 112 UG/1
TABLET ORAL
Qty: 189 TABLET | Refills: 3 | Status: SHIPPED | OUTPATIENT
Start: 2017-05-02 | End: 2017-09-07

## 2017-05-02 NOTE — LETTER
5/2/2017       RE: Tisha Arias  [address] [place] [address]     Dear Colleague,    Thank you for referring your patient, Tisha Arias, to the [facility] ENDOCRINOLOGY at [facility] acute [facility]. Please see a copy of my visit note below.    Endocrinology Consult Note    Attending ASSESSMENT/PLAN:     1.  Papillary thyroid carcinoma, follicular variant, + ETE (minimal), bilateral, MF, node +.  MACIS is < 6 but TNM is stage III, ANSELMO recurrence risk Intermediate.  She has been treated with total Tx and CND and RRA. She now s/p ETOH treatment to 2  left neck LNs (left level 6 and  left level 3)..  Left level 6 LN is gone.      Persistent thyroglobulinemia indicates we still have thyroid tissue.  Repeat level again today.    2.  Cervical adenopathy   A) biopsy proven metastatic thyroid cancer  Left level 3  Cervical LN  (0.5 cm, round) positive for papillary thyroid cancer cytology. This was ETOH treated 8/19/14  And 10/24/14.  I believe the treatment has resolved  this node.    Left level 6 has very high Tg consistent with metastatic thyroid cancer. This was treated with ETOh on 8/19/14 and 10/24/14, and I think also on 12/30 and 12/31/14.  (it had benign cytology on repeat FNAB  12/30/14).  This is gone on 11/15  , 5/16 and 11/16 US  Left level 3 medial to left level 6 - benign on FNAB; I do not think this was treated. This persists and appear benign     3.  Post surgical hypothyroidism. Treat to TSH < 0.4 because of # 1.  She is on  LT4  224 x 7.25/week  , divided (232 mcg/day average).  Change the levothyroxine to the following, using the 112 mcg tablets:  Monday-Saturday: (2 tablet/day);   Sunday: (3 tablets)  This averages 240 mcg/day over the course of a week.    4.   Hypocalcemia with normal PTH.  I think the problem is more one of calcium malabsorption (related to past Rachael en Y) than hypoparathyroidisim, though perhaps she also has subclinical  hypoparathyroidism which makes it harder to compensate.     History of gregory en Y gastric bypass is potentially going to affect absorption of the calcium/ vitamin D/ L-T4. I believe this is an important history relative to the hypoparathyoridsim    Avelina Castro MD        Cc/  HISTORY OF PRESENT ILLNESS Tisha presents today for follow up of thyroid cancer and post surgical hypothyroidism. She was lst seen by me 12/15.  She had a neck US just before this appt today.  She is currently on 224 *7.5/week, divided.     Thyroid cancer was discovered in 2013 in the course of getting PET followup for new diagnosis of breast cancer.    Her course can be summarized as follows:  7/10/13  total Thyroidectomy and CND  removing bilateral, multifocal papillary thyroid carcinoma, left 1.3 cm (Follicular variant) with minimal extrathyoridal extension and right 0.25 cm (microcarcinoma variant).  2/3 left level 6 LNs were + for PCT (MACIS 5.55, TNM stage III).    9/5/13: Thyrogen stimulated 105 mCi 131I .  Post treatment TBS showed intense neck uptake.      6/11/14 FNAB of left  level 3 node with ? microcalcifications cytology positive for malignancy - papillary Ca, needle wash Tg 6.4 ng/ml.    left level 6 LN  Cytology  benign butneedle wash Tg 1213 ng/ml.   8/18/14 and on 10/24/14 ETOH injection into these  2 lymph nodes.      12/30/14  repeat FNAB of medial to level 6 left level 3 and left level 6 LN .  FNAB of the left level 3 LN and left level 6 LN on 12/30 was read as benign.  Needle wash Tg was < 0.3 (left level 3) and 0.6 (left level 6).    12/30 and 12/31/14  ETOH ablation of left level 6 LN (per the images), though the report states this is left level 3 LN that was ablated.     We have the following tumor marker data  5/2/13: Tg 45, ANSELMO < 0.4, TSH   SURGERY  9/5/13: Tg 12.6 ng/ml, ANSELMO < 0.4 ,   I131 105 mCi  10/22/13: Tg 3.7, Anselmo < 0.4 U/ml, TSH 0.27  3/7/14: Tg 5.4, ANSELMO < 0.4, TSH   6/3/14: Tg 1.3, ANSELMO <  0.4, TSH  Not done  7/18/14 Tg 13.6, ANSELMO < 0.4, TSH 16.9 Thyrogen stimulated  123I TBS negative. We did not see uptake in the cervical LNs we know are biopsy proven PCT (as is often the case).   ETOH treatments 8/14 and 10/14  11/25/14 Tg 3.5, ANSELMO < 0.4, TSH 4.95  12/30/14 ETOH treatment  2/17/15: Tg 1.4, ANSELMO < 0.4, TSH 0.41  5/18/15: Tg 1.3, ANSELMO < 0.4, TSH 0.42--  12/7/15: Tg 1.3, ANSELMO < 0.4, TSH 0.12  5/2/16 Tg 1.1 ng/ml , ANSELMO  < 0.4 U/ml.   11/2/16: Tg 1.4, ANSELMO < 0.4, TSH 0.45.  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 No real need for Epipen for this alone.     GDM (gestational diabetes mellitus)     w first pregnancy only     Gestational hypertension      Lymphedema     jackson arms, chest     Migraine      Neuropathy associated with cancer (H)      Papillary carcinoma, follicular variant (H) 6/28/2013    pT3, N1, Mx, thyroid     Post-surgical hypothyroidism      Renal disease     kidney stones     Rhinitis, allergic to other allergen      Right Breast mass and microcalcifications 12/13/2011     Seasonal allergic conjunctivitis      Seasonal allergic rhinitis 4/12/11 skin tests pos. for: dog/M/T/G/W--NEGATIVE FOOD TESTS FOR: shrimp, crab, lobster, coconut     Past Surgical History:    Procedure Laterality Date     BACK SURGERY  ,    Bulging disc w nerve root impigment     BIOPSY BREAST      right     BIOPSY BREAST  11    right, core and sterotactic     BYPASS GASTRIC, CHOLECYSTECTOMY, COMBINED       C LAPAROSCOPIC GASTRIC RESTRICTIVE PX, W/GASTRIC BYPASS/ GAMALIEL-EN-Y, < 150CM      Gamaliel en Y?      SECTION       COLONOSCOPY      normal     COSMETIC SURGERY  2012    Final Stage of Mastectomy     DAVINCI HYSTERECTOMY TOTAL, BILATERAL SALPINGO-OOPHORECTOMY, COMBINED  2012    Procedure: COMBINED DAVINCI HYSTERECTOMY TOTAL, SALPINGO-OOPHORECTOMY;  Davinci Total Laparoscopic Hysterectomy, Bilateral Salpingo Oophorectomy, Pelvic Washings, Cystoscopy;  Surgeon: Evie Sheikh MD;  Location: [facility] OR     ENT SURGERY       GI SURGERY  2007    Gamaliel-en Y w cholecystectomy     GYN SURGERY  89,1/10/92    2 c-sections     HYSTERECTOMY, PAP NO LONGER INDICATED      DeVinci assister lap hyst with BSO     IRRIGATION AND DEBRIDEMENT BREAST  3/1/2012    Procedure:IRRIGATION AND DEBRIDEMENT BREAST; Irrigation and Debridement, Wound Closure Right Breast; Surgeon:JUNG GUILLEN; Location:[facility] OR     MASTECTOMY, RECONSTRUCT BREAST, COMBINED  2012    Procedure:COMBINED MASTECTOMY, RECONSTRUCT BREAST; Bilateral Mastectomies, Right Axillary [place] Node Biopsy, Bilateral Breast Reconstruction with Tissue Expanders, Reconstruction of inframammary fold, bilateral pain management systems; Surgeon:ANUPAM CAMPBELL; Location:[facility] OR     RECONSTRUCT BREAST  2012    Procedure: RECONSTRUCT BREAST;  Bilateral 2nd stage breast reconstruction, revision,       SOFT TISSUE SURGERY  12    Mastectomy w severe myofascial pain syndrome     THYROIDECTOMY  7/10/2013    Procedure: THYROIDECTOMY;  Total Thyroidectomy with central neck dissection;  Surgeon: Indiana Sneed MD;  Location: [facility] OR     TONSILLECTOMY      childhood       Medications  Current  Outpatient Prescriptions   Medication Sig Dispense Refill     Cholecalciferol (VITAMIN D3) 2000 UNITS CAPS Take 10,000 Units by mouth daily 60 capsule 4     SUMAtriptan (IMITREX) 50 MG tablet Take 1 tablet (50 mg) by mouth at onset of headache for migraine (may repeat in 2 hours as needed, max 2 tablets daily) 5 tablet 3     ondansetron (ZOFRAN ODT) 4 MG ODT tab Take 1-2 tablets (4-8 mg) by mouth every 8 hours as needed for nausea or vomiting 60 tablet 6     morphine (MS CONTIN) 15 MG 12 hr tablet 1 tab (15mg) at bedtime. 30 tablet 0     morphine (MS CONTIN) 30 MG 12 hr tablet 1 tab (30mg) in the morning and in the afternoon + 15mg at bedtime 60 tablet 0     diazepam (VALIUM) 5 MG tablet Take 1 tablet (5 mg) by mouth 3 times daily as needed For muscle spasms 90 tablet 2     methocarbamol (ROBAXIN) 750 MG tablet Take 1 tablet (750 mg) by mouth 4 times daily as needed . Ok to take a 5th Robaxin on very severe days. 360 tablet 1     cromolyn (OPTICROM) 4 % ophthalmic solution INSTILL ONE DROP INTO BOTH EYES FOUR TIMES DAILY 10 mL 0     COMPOUND (CMPD RX) - PHARMACY TO MIX COMPOUNDED MEDICATION Apply small amount to affected area two times daily.Ketamine 6% + ketoprofen 10% + lidocaine 10% in Lipo. 120 g 6     lidocaine (XYLOCAINE) 5 % ointment SANJANA TOPICALLY QID PRN 35.44 g 3     Colchicine 0.6 MG CAPS Take 0.6 mg by mouth 3 times daily for costochondritis (chest) discomfort. Scale back use to prn with loose stools or bloating. Dispense Colcrys. Patient cannot use generic due to allergy to dye. 270 capsule 1     order for DME Equipment being ordered: compression stockings. 20-30 mm or 30 - 40 mm as patient can tolerate. Physical therapy to determine if they should be above or below the knee. 2 Units 4     albuterol (VENTOLIN HFA) 108 (90 BASE) MCG/ACT Inhaler Inhale 2 puffs into the lungs every 4 hours as needed for shortness of breath / dyspnea or wheezing 18 g 5     order for DME Equipment being ordered: compression  bra 2 Device 1     ranitidine (ZANTAC) 75 MG tablet Take 1 tablet (75 mg) by mouth 3 times daily 270 tablet 3     tamoxifen (NOLVADEX) 20 MG tablet Take 1 tablet (20 mg) by mouth daily 90 tablet 3     montelukast (SINGULAIR) 10 MG tablet TAKE TWO TABLETS BY MOUTH TWICE DAILY 120 tablet 5     Lidocaine HCl Monohydrate POWD        celecoxib (CELEBREX) 200 MG capsule Take 1 capsule (200 mg) by mouth 2 times daily 180 capsule 2     calcitRIOL (ROCALTROL) 0.25 MCG capsule 2 tabs in am and 1 tab qhs 270 capsule 3     hydrochlorothiazide (MICROZIDE) 12.5 MG capsule Take 1 capsule (12.5 mg) by mouth daily 90 capsule 3     levothyroxine (SYNTHROID, LEVOTHROID) 112 MCG tablet Monday-Saturday: (2 tablet/day);   Sunday: (2.5 tablet) 189 tablet 3     aluminum chloride (DRYSOL) 20 % external solution Apply topically At Bedtime 60 mL 3     tacrolimus (PROTOPIC) 0.1 % ointment Apply topically 2 times daily 60 g 3     triamcinolone (KENALOG) 0.025 % ointment Apply topically 2 times daily Mix with 1 lb jar of vaseline or aquaphor 80 g 3     potassium chloride SA (POTASSIUM CHLORIDE) 20 MEQ tablet Take 1 tablet (20 mEq) by mouth daily 90 tablet 3     EPINEPHrine (EPIPEN 2-CARIDAD) 0.3 MG/0.3ML injection Inject 0.3 mLs (0.3 mg) into the muscle once as needed for anaphylaxis 2 each 4     Cromolyn Sodium ([employer] CROMOLYN SODIUM) 5.2 MG/ACT AERS Spray 1 spray (1 mL) in nostril daily 1 Bottle 6     UNABLE TO FIND MEDICATION NAME: Tumeric 3 capsules daily       cetirizine (ZYRTEC ALLERGY) 10 MG tablet Take 1 tablet (10 mg) by mouth 3 times daily On hold for lab test. 90 tablet 6     diclofenac (VOLTAREN) 1 % GEL Apply affected area two times daily PRN using enclosed dosing card. 100 g 1     mometasone (ASMANEX 30 METERED DOSES) 110 MCG/INH inhaler Inhale 1 puff into the lungs daily 3 Inhaler 3     UNABLE TO FIND 2 tablets 3 times daily MEDICATION NAME: Natural D-Hist       MAGNESIUM CITRATE PO Take 400 mg by mouth daily       DiphenhydrAMINE HCl  (BENADRYL PO) Take 50 mg by mouth as needed       calcium citrate-vitamin D (CALCIUM CITRATE +D) 315-250 MG-UNIT TABS Take 2 tablets by mouth 3 times daily 120 tablet      calcium carbonate (TUMS) 500 MG chewable tablet Take 2 tablets (1,000 mg) by mouth nightly as needed for other (cramps) 180 chew tab 3     UNABLE TO FIND 1 tablet 3 times daily MEDICATION NAME: calcium D-Glucarate       Digestive Enzymes (BETAINE HCL PO) Take 2 tablets by mouth as needed Betaine HCL and Pepsin: Take 1-7 tabs by mouth daily, if burning take one tablet less once daily.  Betaine HCL 1.04 g  Pepsin 40 mg       Triamcinolone Acetonide (AZMACORT IN) Inhale 2 puffs into the lungs as needed       UNABLE TO FIND 1 tablet 3 times daily MEDICATION NAME: Digestzymes       UNABLE TO FIND 1 tablet daily MEDICATION NAME: Pure Encapsulations       cyclobenzaprine (FLEXERIL) 10 MG tablet Take 10 mg by mouth 3 times daily as needed On hold Dr Gricel Lin     Omega-3 Fatty Acids (OMEGA 3 PO) Take 5 mLs by mouth       ACAI PO Take 1,000 mg by mouth 2 times daily       Probiotic Product (PROBIOTIC DAILY PO) Take 1 capsule by mouth daily Lacto acid bifidobacterium       polyethylene glycol (MIRALAX) powder Take 17 g by mouth daily 510 g 1     docusate sodium 100 MG tablet Take 100 mg by mouth daily 60 tablet 1       Allergies  Allergies   Allergen Reactions     No Clinical Screening - See Comments Shortness Of Breath, Palpitations, Anaphylaxis, Itching, Swelling, Difficulty breathing and Rash     Sukhjinder wipes- oral allergy -  July 2015: throat tightness from a Chinese herbal medicine Wilmer Tran     Amitriptyline Hcl Swelling     Baclofen      Birch Trees      Potatoes, carrots, cherries, celery, apple, pears, plums, peaches, parsnip, kiwi, hazelnuts, and apricots,      Blue Dyes      Headaches       Cymbalta Other (See Comments)     Flushing, tremor/muscle twitching and edema     Gabapentin      edema     Grass      Mugwort [Artemisia Vulgaris]       Various spices     Nortriptyline Itching, Visual Disturbance, Swelling and GI Disturbance     Ragweeds      Melons, bananas, cucumbers, zucchini.     Topamax      Serotonin Anxiety and Swelling       Family History  No thyroid  Family History   Problem Relation Age of Onset     Allergies Mother      Arthritis Mother      CANCER Mother      uterine/uterine     Hypertension Mother      on HCTZ     Hyperlipidemia Mother      CEREBROVASCULAR DISEASE Mother      s/p brain surgery     Breast Cancer Mother      Depression Mother      Anxiety Disorder Mother      Anesthesia Reaction Mother      Asthma Mother      Skin Cancer Mother      HEART DISEASE Father      DIABETES Father      Coronary Artery Disease Father      Hypertension Father      Hyperlipidemia Father      CEREBROVASCULAR DISEASE Father      Multiple strokes     Obesity Father      DIABETES Brother      Depression Brother      Hypertension Brother      CANCER Brother 57     pancreatic cancer     CANCER Maternal Grandfather      pancreatic cancer/stomach cancer     Prostate Cancer Maternal Grandfather      Prostrate and Bladder     Other Cancer Maternal Grandfather      Pancreatic 1988, Bladder 1977     CANCER Maternal Grandmother      uterine     Breast Cancer Maternal Grandmother      Skin Cancer Maternal Grandmother      DIABETES Brother      Breast Cancer Cousin      Grand mothers sister     Other Cancer Brother      Stage 3 Pancreatic 2-2015     Depression Brother      Asthma Brother      Obesity Brother      Anesthesia Reaction Other      H/a, itching, drug interactions     Asthma Other      Thyroid Disease No family hx of      CANCER Brother      Pancreatic        Social History  Social History   Substance Use Topics     Smoking status: Never Smoker     Smokeless tobacco: Never Used      Comment: no 2nd hand smoke exposure     Alcohol use Yes      Comment: rare     RN.  ; new job    ROS:   Hoarseness is better  GI: constipation  Jair horses  "every day - takes extra TUMS for this.   Last one in calf bruised the leg - she has tried her TENS unit on the muscle cramps x 1 , with success.  SHANNON has had painful arm spasm/ cramp associated with movement of RUE  Pain is the same- hasn't been to 2nd plastic surgeon yet.       Physical Exam  /70 (BP Location: Left arm, Patient Position: Chair, Cuff Size: Adult Large)  Pulse 85  Ht 1.651 m (5' 5\")  There is no height or weight on file to calculate BMI.  GENERAL :   middle aged woman.    SKIN: Normal color,  Pink cheeks  EYES: PER,No scleral icterus  NECK no masses  LUNGS: clear bilaterally  CARDIAC: RRR S1 S2  NEURO: awake, alert, responds appropriately to questions.  Moves all extremities;    DATA REVIEW    Results for IMMANUEL LOVETT (MRN [id number]) as of 5/2/2017 10:56   Ref. Range 4/26/2017 17:14   Creatinine Latest Ref Range: 0.52 - 1.04 mg/dL 0.83   GFR Estimate Latest Ref Range: >60 mL/min/1.7m2 71   GFR Estimate If Black Latest Ref Range: >60 mL/min/1.7m2 86   Calcium Latest Ref Range: 8.5 - 10.1 mg/dL 8.3 (L)   Phosphorus Latest Ref Range: 2.5 - 4.5 mg/dL 4.1   T4 Free Latest Ref Range: 0.76 - 1.46 ng/dL 1.08   Thyroglobulin and Antibodies Unknown Scanned Laborator...   TSH Latest Ref Range: 0.40 - 4.00 mU/L 0.50         EXAMINATION: Ultrasound head neck soft tissue, 4/26/2017 6:10 PM      COMPARISON: 11/2/2016     HISTORY: Postprocedural hypoparathyroidism, malignant neoplasm of  thyroid gland, postprocedural hypothyroidism     FINDINGS:     Lymph nodes are measured bilaterally with measurements given in  craniocaudal, transverse and AP dimensions as follows:     Right:  Level 1: None  Level 2:   #1: 1.3 x 0.6 x 1.8 cm, prior 1.1 x 0.5 x 2.0 cm, fatty hilum.  #2: 0.9 x 0.5 x 1.3 cm, prior 0.9 x 0.6 x 1.1 cm, fatty hilum  Level 3: None  Level 4: 0.7 x 0.5 x 0.6 cm, although not previously documented, on  exam 11/3/2015 cine images right trans neck level 2,3, 4 image 134,  there is a 4 mm " lymph node which is likely the same lymph node and has  not changed.  Level 5: None  Level 6: None     Left:  Level 1: None  Level 2: First lymph node measures 1.1 x 0.5 x 1.3 cm, (prior 1.1 x  0.6 x 1.4 cm), fatty hilum   second lymph node measures 1.0 x 0.4 x 0.9 cm (prior 0.9 x 0.6 x 1.1  cm), fatty hilum  Level 3: 0.5 x 0.6 x 1.2 cm (prior 0.7 x 0.5 x 1.3 cm), fatty hilum  Level 4: None  Level 5: None  Level 6: None         IMPRESSION:  1. Soft tissue neck ultrasound with lymph node measurements as  described above. Overall unchanged or slightly decreased in size.      I have personally reviewed the examination and initial interpretation  and I agree with the findings.     GINNA MARION MD    Again, thank you for allowing me to participate in the care of your patient.      Sincerely,    Avelina Castro MD

## 2017-05-02 NOTE — NURSING NOTE
"Chief Complaint   Patient presents with     RECHECK     POSTSURGICAL HYPOTHYROIDISM F/U       Initial /70 (BP Location: Left arm, Patient Position: Chair, Cuff Size: Adult Large)  Pulse 85  Ht 1.651 m (5' 5\") Estimated body mass index is 33.55 kg/(m^2) as calculated from the following:    Height as of 7/12/16: 1.651 m (5' 5\").    Weight as of 7/12/16: 91.4 kg (201 lb 9.6 oz).  Medication Reconciliation: complete         Suki House, CMA     "

## 2017-05-02 NOTE — PATIENT INSTRUCTIONS
Change the levothyroxine to the following, using the 112 mcg tablets:  Monday-Saturday: (2 tablet/day);   Sunday: (3 tablets)  This averages 240 mcg/day over the course of a week.    You should have labs to follow up on the change in about 2 months.  I am leaving orders for follow up labs on the medical record.  Please call [phone]  to schedule lab follow up testing as directed.  Alternatively, it can be drawn in any [facility] lab.        To expedite your medication refill(s), please contact your pharmacy and have them fax a refill request to: [phone].  *Please allow 3 business days for routine medication refills.  *Please allow 5 business days for controlled substance medication refills.  --------------------  For scheduling appointments (including lab work), please request an appointment through [employer], or call: [phone].    For questions for your provider or the endocrine nurse, please send a [employer] message.  For after-hours urgent issues, please dial [phone], and ask to speak with the Endocrinologist On-Call.  --------------------  Please Note: If you are active on [employer], all future test results will be sent by [employer] message only and will no longer be sent by mail. You may also receive communication directly from your physician.

## 2017-05-02 NOTE — PROGRESS NOTES
Endocrinology Consult Note    Attending ASSESSMENT/PLAN:     1.  Papillary thyroid carcinoma, follicular variant, + ETE (minimal), bilateral, MF, node +.  MACIS is < 6 but TNM is stage III, ANSELMO recurrence risk Intermediate.  She has been treated with total Tx and CND and RRA. She now s/p ETOH treatment to 2  left neck LNs (left level 6 and  left level 3)..  Left level 6 LN is gone.      Persistent thyroglobulinemia indicates we still have thyroid tissue.  Repeat level again today.    2.  Cervical adenopathy   A) biopsy proven metastatic thyroid cancer  Left level 3  Cervical LN  (0.5 cm, round) positive for papillary thyroid cancer cytology. This was ETOH treated 8/19/14  And 10/24/14.  I believe the treatment has resolved  this node.    Left level 6 has very high Tg consistent with metastatic thyroid cancer. This was treated with ETOh on 8/19/14 and 10/24/14, and I think also on 12/30 and 12/31/14.  (it had benign cytology on repeat FNAB  12/30/14).  This is gone on 11/15 US , 5/16 and 11/16 US  Left level 3 medial to left level 6 - benign on FNAB; I do not think this was treated. This persists and appear benign     3.  Post surgical hypothyroidism. Treat to TSH < 0.4 because of # 1.  She is on  LT4  224 x 7.25/week  , divided (232 mcg/day average).  Change the levothyroxine to the following, using the 112 mcg tablets:  Monday-Saturday: (2 tablet/day);   Sunday: (3 tablets)  This averages 240 mcg/day over the course of a week.    4.   Hypocalcemia with normal PTH.  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     History of gregory en Y gastric bypass is potentially going to affect absorption of the calcium/ vitamin D/ L-T4. I believe this is an important history relative to the hypoparathyoridsim    Avelina Castro MD        Cc/  HISTORY OF PRESENT  CLARITA Giles presents today for follow up of thyroid cancer and post surgical hypothyroidism. She was lst seen by me 12/15.  She had a neck US just before this appt today.  She is currently on 224 *7.5/week, divided.     Thyroid cancer was discovered in 2013 in the course of getting PET followup for new diagnosis of breast cancer.    Her course can be summarized as follows:  7/10/13  total Thyroidectomy and CND  removing bilateral, multifocal papillary thyroid carcinoma, left 1.3 cm (Follicular variant) with minimal extrathyoridal extension and right 0.25 cm (microcarcinoma variant).  2/3 left level 6 LNs were + for PCT (MACIS 5.55, TNM stage III).    9/5/13: Thyrogen stimulated 105 mCi 131I .  Post treatment TBS showed intense neck uptake.      6/11/14 FNAB of left  level 3 node with ? microcalcifications cytology positive for malignancy - papillary Ca, needle wash Tg 6.4 ng/ml.    left level 6 LN  Cytology  benign butneedle wash Tg 1213 ng/ml.   8/18/14 and on 10/24/14 ETOH injection into these  2 lymph nodes.      12/30/14  repeat FNAB of medial to level 6 left level 3 and left level 6 LN .  FNAB of the left level 3 LN and left level 6 LN on 12/30 was read as benign.  Needle wash Tg was < 0.3 (left level 3) and 0.6 (left level 6).    12/30 and 12/31/14  ETOH ablation of left level 6 LN (per the images), though the report states this is left level 3 LN that was ablated.     We have the following tumor marker data  5/2/13: Tg 45, ANSELMO < 0.4, TSH   SURGERY  9/5/13: Tg 12.6 ng/ml, ANSELMO < 0.4 ,   I131 105 mCi  10/22/13: Tg 3.7, Anselmo < 0.4 U/ml, TSH 0.27  3/7/14: Tg 5.4, ANSELMO < 0.4, TSH   6/3/14: Tg 1.3, ANSELMO < 0.4, TSH  Not done  7/18/14 Tg 13.6, ANSELMO < 0.4, TSH 16.9 Thyrogen stimulated  123I TBS negative. We did not see uptake in the cervical LNs we know are biopsy proven PCT (as is often the case).   ETOH treatments 8/14 and 10/14  11/25/14 Tg 3.5, ANSELMO < 0.4, TSH 4.95  12/30/14 ETOH treatment  2/17/15: Tg  1.4, ANSELMO < 0.4, TSH 0.41  5/18/15: Tg 1.3, ANSELMO < 0.4, TSH 0.42--  12/7/15: Tg 1.3, ANSELMO < 0.4, TSH 0.12  5/2/16 Tg 1.1 ng/ml , ANSELMO  < 0.4 U/ml.   11/2/16: Tg 1.4, ANSELMO < 0.4, TSH 0.45.  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 No real need for Epipen for this alone.     GDM (gestational diabetes mellitus)     w first pregnancy only     Gestational hypertension      Lymphedema     jackson arms, chest     Migraine      Neuropathy associated with cancer (H)      Papillary carcinoma, follicular variant (H) 6/28/2013    pT3, N1, Mx, thyroid     Post-surgical hypothyroidism      Renal disease     kidney stones     Rhinitis, allergic to other allergen      Right Breast mass and microcalcifications 12/13/2011     Seasonal allergic conjunctivitis      Seasonal allergic rhinitis 4/12/11 skin tests pos. for: dog/M/T/G/W--NEGATIVE FOOD TESTS FOR: shrimp, crab, lobster, coconut     Past Surgical History:   Procedure Laterality Date     BACK SURGERY  2000,    Bulging disc w nerve root impigment     BIOPSY BREAST  1989    right     BIOPSY BREAST  12/13/11    right, core and sterotactic     BYPASS GASTRIC, CHOLECYSTECTOMY, COMBINED  2007     C LAPAROSCOPIC GASTRIC RESTRICTIVE PX, W/GASTRIC BYPASS/  GAMALIEL-EN-Y, < 150CM      Gamaliel en Y?      SECTION       COLONOSCOPY      normal     COSMETIC SURGERY  2012    Final Stage of Mastectomy     DAVINCI HYSTERECTOMY TOTAL, BILATERAL SALPINGO-OOPHORECTOMY, COMBINED  2012    Procedure: COMBINED DAVINCI HYSTERECTOMY TOTAL, SALPINGO-OOPHORECTOMY;  Davinci Total Laparoscopic Hysterectomy, Bilateral Salpingo Oophorectomy, Pelvic Washings, Cystoscopy;  Surgeon: Evie Sheikh MD;  Location: [facility] OR     ENT SURGERY       GI SURGERY  2007    Gamaliel-en Y w cholecystectomy     GYN SURGERY  89,1/10/92    2 c-sections     HYSTERECTOMY, PAP NO LONGER INDICATED      DeVinci assister lap hyst with BSO     IRRIGATION AND DEBRIDEMENT BREAST  3/1/2012    Procedure:IRRIGATION AND DEBRIDEMENT BREAST; Irrigation and Debridement, Wound Closure Right Breast; Surgeon:JUNG GUILLEN; Location:[facility] OR     MASTECTOMY, RECONSTRUCT BREAST, COMBINED  2012    Procedure:COMBINED MASTECTOMY, RECONSTRUCT BREAST; Bilateral Mastectomies, Right Axillary [place] Node Biopsy, Bilateral Breast Reconstruction with Tissue Expanders, Reconstruction of inframammary fold, bilateral pain management systems; Surgeon:ANUPAM CAMPBELL; Location:[facility] OR     RECONSTRUCT BREAST  2012    Procedure: RECONSTRUCT BREAST;  Bilateral 2nd stage breast reconstruction, revision,       SOFT TISSUE SURGERY  12    Mastectomy w severe myofascial pain syndrome     THYROIDECTOMY  7/10/2013    Procedure: THYROIDECTOMY;  Total Thyroidectomy with central neck dissection;  Surgeon: Indiana Sneed MD;  Location: [facility] OR     TONSILLECTOMY      childhood       Medications  Current Outpatient Prescriptions   Medication Sig Dispense Refill     Cholecalciferol (VITAMIN D3) 2000 UNITS CAPS Take 10,000 Units by mouth daily 60 capsule 4     SUMAtriptan (IMITREX) 50 MG tablet Take 1 tablet (50 mg) by mouth at onset of headache for migraine (may repeat in 2 hours as needed, max 2 tablets  daily) 5 tablet 3     ondansetron (ZOFRAN ODT) 4 MG ODT tab Take 1-2 tablets (4-8 mg) by mouth every 8 hours as needed for nausea or vomiting 60 tablet 6     morphine (MS CONTIN) 15 MG 12 hr tablet 1 tab (15mg) at bedtime. 30 tablet 0     morphine (MS CONTIN) 30 MG 12 hr tablet 1 tab (30mg) in the morning and in the afternoon + 15mg at bedtime 60 tablet 0     diazepam (VALIUM) 5 MG tablet Take 1 tablet (5 mg) by mouth 3 times daily as needed For muscle spasms 90 tablet 2     methocarbamol (ROBAXIN) 750 MG tablet Take 1 tablet (750 mg) by mouth 4 times daily as needed . Ok to take a 5th Robaxin on very severe days. 360 tablet 1     cromolyn (OPTICROM) 4 % ophthalmic solution INSTILL ONE DROP INTO BOTH EYES FOUR TIMES DAILY 10 mL 0     COMPOUND (CMPD RX) - PHARMACY TO MIX COMPOUNDED MEDICATION Apply small amount to affected area two times daily.Ketamine 6% + ketoprofen 10% + lidocaine 10% in Lipo. 120 g 6     lidocaine (XYLOCAINE) 5 % ointment SANJANA TOPICALLY QID PRN 35.44 g 3     Colchicine 0.6 MG CAPS Take 0.6 mg by mouth 3 times daily for costochondritis (chest) discomfort. Scale back use to prn with loose stools or bloating. Dispense Colcrys. Patient cannot use generic due to allergy to dye. 270 capsule 1     order for DME Equipment being ordered: compression stockings. 20-30 mm or 30 - 40 mm as patient can tolerate. Physical therapy to determine if they should be above or below the knee. 2 Units 4     albuterol (VENTOLIN HFA) 108 (90 BASE) MCG/ACT Inhaler Inhale 2 puffs into the lungs every 4 hours as needed for shortness of breath / dyspnea or wheezing 18 g 5     order for DME Equipment being ordered: compression bra 2 Device 1     ranitidine (ZANTAC) 75 MG tablet Take 1 tablet (75 mg) by mouth 3 times daily 270 tablet 3     tamoxifen (NOLVADEX) 20 MG tablet Take 1 tablet (20 mg) by mouth daily 90 tablet 3     montelukast (SINGULAIR) 10 MG tablet TAKE TWO TABLETS BY MOUTH TWICE DAILY 120 tablet 5     Lidocaine HCl  Monohydrate POWD        celecoxib (CELEBREX) 200 MG capsule Take 1 capsule (200 mg) by mouth 2 times daily 180 capsule 2     calcitRIOL (ROCALTROL) 0.25 MCG capsule 2 tabs in am and 1 tab qhs 270 capsule 3     hydrochlorothiazide (MICROZIDE) 12.5 MG capsule Take 1 capsule (12.5 mg) by mouth daily 90 capsule 3     levothyroxine (SYNTHROID, LEVOTHROID) 112 MCG tablet Monday-Saturday: (2 tablet/day);   Sunday: (2.5 tablet) 189 tablet 3     aluminum chloride (DRYSOL) 20 % external solution Apply topically At Bedtime 60 mL 3     tacrolimus (PROTOPIC) 0.1 % ointment Apply topically 2 times daily 60 g 3     triamcinolone (KENALOG) 0.025 % ointment Apply topically 2 times daily Mix with 1 lb jar of vaseline or aquaphor 80 g 3     potassium chloride SA (POTASSIUM CHLORIDE) 20 MEQ tablet Take 1 tablet (20 mEq) by mouth daily 90 tablet 3     EPINEPHrine (EPIPEN 2-CARIDAD) 0.3 MG/0.3ML injection Inject 0.3 mLs (0.3 mg) into the muscle once as needed for anaphylaxis 2 each 4     Cromolyn Sodium ([employer] CROMOLYN SODIUM) 5.2 MG/ACT AERS Spray 1 spray (1 mL) in nostril daily 1 Bottle 6     UNABLE TO FIND MEDICATION NAME: Tumeric 3 capsules daily       cetirizine (ZYRTEC ALLERGY) 10 MG tablet Take 1 tablet (10 mg) by mouth 3 times daily On hold for lab test. 90 tablet 6     diclofenac (VOLTAREN) 1 % GEL Apply affected area two times daily PRN using enclosed dosing card. 100 g 1     mometasone (ASMANEX 30 METERED DOSES) 110 MCG/INH inhaler Inhale 1 puff into the lungs daily 3 Inhaler 3     UNABLE TO FIND 2 tablets 3 times daily MEDICATION NAME: Natural D-Hist       MAGNESIUM CITRATE PO Take 400 mg by mouth daily       DiphenhydrAMINE HCl (BENADRYL PO) Take 50 mg by mouth as needed       calcium citrate-vitamin D (CALCIUM CITRATE +D) 315-250 MG-UNIT TABS Take 2 tablets by mouth 3 times daily 120 tablet      calcium carbonate (TUMS) 500 MG chewable tablet Take 2 tablets (1,000 mg) by mouth nightly as needed for other (cramps) 180 chew tab 3      UNABLE TO FIND 1 tablet 3 times daily MEDICATION NAME: calcium D-Glucarate       Digestive Enzymes (BETAINE HCL PO) Take 2 tablets by mouth as needed Betaine HCL and Pepsin: Take 1-7 tabs by mouth daily, if burning take one tablet less once daily.  Betaine HCL 1.04 g  Pepsin 40 mg       Triamcinolone Acetonide (AZMACORT IN) Inhale 2 puffs into the lungs as needed       UNABLE TO FIND 1 tablet 3 times daily MEDICATION NAME: Digestzymes       UNABLE TO FIND 1 tablet daily MEDICATION NAME: Pure Encapsulations       cyclobenzaprine (FLEXERIL) 10 MG tablet Take 10 mg by mouth 3 times daily as needed On hold Dr Gricel Lin     Omega-3 Fatty Acids (OMEGA 3 PO) Take 5 mLs by mouth       ACAI PO Take 1,000 mg by mouth 2 times daily       Probiotic Product (PROBIOTIC DAILY PO) Take 1 capsule by mouth daily Lacto acid bifidobacterium       polyethylene glycol (MIRALAX) powder Take 17 g by mouth daily 510 g 1     docusate sodium 100 MG tablet Take 100 mg by mouth daily 60 tablet 1       Allergies  Allergies   Allergen Reactions     No Clinical Screening - See Comments Shortness Of Breath, Palpitations, Anaphylaxis, Itching, Swelling, Difficulty breathing and Rash     Sukhjinder wipes- oral allergy -  July 2015: throat tightness from a Chinese herbal medicine Wilmer Tran     Amitriptyline Hcl Swelling     Baclofen      Birch Trees      Potatoes, carrots, cherries, celery, apple, pears, plums, peaches, parsnip, kiwi, hazelnuts, and apricots,      Blue Dyes      Headaches       Cymbalta Other (See Comments)     Flushing, tremor/muscle twitching and edema     Gabapentin      edema     Grass      Mugwort [Artemisia Vulgaris]      Various spices     Nortriptyline Itching, Visual Disturbance, Swelling and GI Disturbance     Ragweeds      Melons, bananas, cucumbers, zucchini.     Topamax      Serotonin Anxiety and Swelling       Family History  No thyroid  Family History   Problem Relation Age of Onset     Allergies Mother       Arthritis Mother      CANCER Mother      uterine/uterine     Hypertension Mother      on HCTZ     Hyperlipidemia Mother      CEREBROVASCULAR DISEASE Mother      s/p brain surgery     Breast Cancer Mother      Depression Mother      Anxiety Disorder Mother      Anesthesia Reaction Mother      Asthma Mother      Skin Cancer Mother      HEART DISEASE Father      DIABETES Father      Coronary Artery Disease Father      Hypertension Father      Hyperlipidemia Father      CEREBROVASCULAR DISEASE Father      Multiple strokes     Obesity Father      DIABETES Brother      Depression Brother      Hypertension Brother      CANCER Brother 57     pancreatic cancer     CANCER Maternal Grandfather      pancreatic cancer/stomach cancer     Prostate Cancer Maternal Grandfather      Prostrate and Bladder     Other Cancer Maternal Grandfather      Pancreatic 1988, Bladder 1977     CANCER Maternal Grandmother      uterine     Breast Cancer Maternal Grandmother      Skin Cancer Maternal Grandmother      DIABETES Brother      Breast Cancer Cousin      Grand mothers sister     Other Cancer Brother      Stage 3 Pancreatic 2-2015     Depression Brother      Asthma Brother      Obesity Brother      Anesthesia Reaction Other      H/a, itching, drug interactions     Asthma Other      Thyroid Disease No family hx of      CANCER Brother      Pancreatic        Social History  Social History   Substance Use Topics     Smoking status: Never Smoker     Smokeless tobacco: Never Used      Comment: no 2nd hand smoke exposure     Alcohol use Yes      Comment: rare     RN.  ; new job    ROS:   Hoarseness is better  GI: constipation  Jair horses every day - takes extra TUMS for this.   Last one in calf bruised the leg - she has tried her TENS unit on the muscle cramps x 1 , with success.  RUE has had painful arm spasm/ cramp associated with movement of RUE  Pain is the same- hasn't been to 2nd plastic surgeon yet.       Physical Exam  /70  "(BP Location: Left arm, Patient Position: Chair, Cuff Size: Adult Large)  Pulse 85  Ht 1.651 m (5' 5\")  There is no height or weight on file to calculate BMI.  GENERAL :   middle aged woman.    SKIN: Normal color,  Pink cheeks  EYES: PER,No scleral icterus  NECK no masses  LUNGS: clear bilaterally  CARDIAC: RRR S1 S2  NEURO: awake, alert, responds appropriately to questions.  Moves all extremities;    DATA REVIEW    Results for IMMANUEL LOVETT (MRN [id number]) as of 5/2/2017 10:56   Ref. Range 4/26/2017 17:14   Creatinine Latest Ref Range: 0.52 - 1.04 mg/dL 0.83   GFR Estimate Latest Ref Range: >60 mL/min/1.7m2 71   GFR Estimate If Black Latest Ref Range: >60 mL/min/1.7m2 86   Calcium Latest Ref Range: 8.5 - 10.1 mg/dL 8.3 (L)   Phosphorus Latest Ref Range: 2.5 - 4.5 mg/dL 4.1   T4 Free Latest Ref Range: 0.76 - 1.46 ng/dL 1.08   Thyroglobulin and Antibodies Unknown Scanned Laborator...   TSH Latest Ref Range: 0.40 - 4.00 mU/L 0.50         EXAMINATION: Ultrasound head neck soft tissue, 4/26/2017 6:10 PM      COMPARISON: 11/2/2016     HISTORY: Postprocedural hypoparathyroidism, malignant neoplasm of  thyroid gland, postprocedural hypothyroidism     FINDINGS:     Lymph nodes are measured bilaterally with measurements given in  craniocaudal, transverse and AP dimensions as follows:     Right:  Level 1: None  Level 2:   #1: 1.3 x 0.6 x 1.8 cm, prior 1.1 x 0.5 x 2.0 cm, fatty hilum.  #2: 0.9 x 0.5 x 1.3 cm, prior 0.9 x 0.6 x 1.1 cm, fatty hilum  Level 3: None  Level 4: 0.7 x 0.5 x 0.6 cm, although not previously documented, on  exam 11/3/2015 cine images right trans neck level 2,3, 4 image 134,  there is a 4 mm lymph node which is likely the same lymph node and has  not changed.  Level 5: None  Level 6: None     Left:  Level 1: None  Level 2: First lymph node measures 1.1 x 0.5 x 1.3 cm, (prior 1.1 x  0.6 x 1.4 cm), fatty hilum   second lymph node measures 1.0 x 0.4 x 0.9 cm (prior 0.9 x 0.6 x 1.1  cm), fatty " hilum  Level 3: 0.5 x 0.6 x 1.2 cm (prior 0.7 x 0.5 x 1.3 cm), fatty hilum  Level 4: None  Level 5: None  Level 6: None         IMPRESSION:  1. Soft tissue neck ultrasound with lymph node measurements as  described above. Overall unchanged or slightly decreased in size.      I have personally reviewed the examination and initial interpretation  and I agree with the findings.     GINNA MARION MD

## 2017-05-02 NOTE — MR AVS SNAPSHOT
After Visit Summary   5/2/2017    Tisha Arias    MRN: [id number]           Patient Information     Date Of Birth          1960        Visit Information        Provider Department      5/2/2017 4:00 PM Avelina Castro MD [facility]        Today's Diagnoses     Thyroid cancer (H)        Postsurgical hypothyroidism        Papillary carcinoma, follicular variant (H)        Postsurgical hypoparathyroidism          Care Instructions    Change the levothyroxine to the following, using the 112 mcg tablets:  Monday-Saturday: (2 tablet/day);   Sunday: (3 tablets)  This averages 240 mcg/day over the course of a week.    You should have labs to follow up on the change in about 2 months.  I am leaving orders for follow up labs on the medical record.  Please call [phone]  to schedule lab follow up testing as directed.  Alternatively, it can be drawn in any [facility] lab.        To expedite your medication refill(s), please contact your pharmacy and have them fax a refill request to: [phone].  *Please allow 3 business days for routine medication refills.  *Please allow 5 business days for controlled substance medication refills.  --------------------  For scheduling appointments (including lab work), please request an appointment through Wishdates, or call: [phone].    For questions for your provider or the endocrine nurse, please send a Wishdates message.  For after-hours urgent issues, please dial [phone], and ask to speak with the Endocrinologist On-Call.  --------------------  Please Note: If you are active on Wishdates, all future test results will be sent by Wishdates message only and will no longer be sent by mail. You may also receive communication directly from your physician.          Follow-ups after your visit        Your next 10 appointments already scheduled     Jul 12, 2017  7:00 AM CDT   (Arrive by 6:45 AM)   Return Visit with Edu Benitez MD   [facility]  [facility] ([facility])    [address] [place] [id number]   [phone]            Jul 12, 2017  7:30 AM CDT   LAB with  LAB   [facility] ([facility])    [address] [place] [id number] [phone]           Patient must bring picture ID.  Patient should be prepared to give a urine specimen  Please do not eat 10-12 hours before your appointment if you are coming in fasting for labs on lipids, cholesterol, or glucose (sugar).  Pregnant women should follow their Care Team instructions. Water with medications is okay. Do not drink coffee or other fluids.   If you have concerns about taking  your medications, please ask at office or if scheduling via [employer], send a message by clicking on Secure Messaging, Message Your Care Team.            Jul 24, 2017  4:30 PM CDT   (Arrive by 4:15 PM)   Return Visit with Shahla Crawley MD   [facility] ([facility])    [address] [place] [id number]   [phone]            Nov 02, 2017  5:00 PM CDT   US HEAD NECK SOFT TISSUE with UCUS3   [facility] ([facility])    [address] [place] [id number] [phone]           Please bring a list of your medicines (including vitamins, minerals and over-the-counter drugs). Also, tell your doctor about any allergies you may have. Wear comfortable clothes and leave your valuables at home.  You do not need to do anything special to prepare for your exam.  Please call the Imaging Department at your exam site with any questions.            Nov 02, 2017  6:00 PM CDT   LAB with  LAB   [facility] ([facility])    [address] [place] [id number] [phone]           Patient must bring picture ID.  Patient should be  prepared to give a urine specimen  Please do not eat 10-12 hours before your appointment if you are coming in fasting for labs on lipids, cholesterol, or glucose (sugar).  Pregnant women should follow their Care Team instructions. Water with medications is okay. Do not drink coffee or other fluids.   If you have concerns about taking  your medications, please ask at office or if scheduling via [employer], send a message by clicking on Secure Messaging, Message Your Care Team.            Nov 14, 2017  4:30 PM CST   (Arrive by 4:15 PM)   RETURN ENDOCRINE with Avelina Castro MD   [facility] ([facility])    [address] [address] [phone]              Future tests that were ordered for you today     Open Future Orders        Priority Expected Expires Ordered    US head neck soft tissue Routine 11/2/2017 11/28/2018 5/2/2017    TSH Routine 11/2/2017 5/2/2018 5/2/2017    T4 free Routine 11/2/2017 5/2/2018 5/2/2017    Thyroglobulin (Total, antibody & recovery %) Routine 11/2/2017 5/2/2018 5/2/2017    Calcium Routine 11/2/2017 5/2/2018 5/2/2017    Phosphorus Routine 11/2/2017 5/2/2018 5/2/2017    Creatinine Routine 11/2/2017 5/2/2018 5/2/2017    25 Hydroxyvitamin D2 and D3 Routine 11/2/2017 5/2/2018 5/2/2017    TSH Routine 7/2/2017 5/2/2018 5/2/2017    T4 free Routine 7/2/2017 5/2/2018 5/2/2017            Who to contact     Please call your clinic at [phone] to:    Ask questions about your health    Make or cancel appointments    Discuss your medicines    Learn about your test results    Speak to your doctor   If you have compliments or concerns about an experience at your clinic, or if you wish to file a complaint, please contact [facility] Patient Relations at [phone] or email us at [email or web address]         Additional Information About Your Visit        [employer] Information     [employer] gives  "you secure access to your electronic health record. If you see a primary care provider, you can also send messages to your care team and make appointments. If you have questions, please call your primary care clinic.  If you do not have a primary care provider, please call [phone] and they will assist you.      [employer] is an electronic gateway that provides easy, online access to your medical records. With [employer], you can request a clinic appointment, read your test results, renew a prescription or communicate with your care team.     To access your existing account, please contact your [facility] or call [phone] for assistance.        Care EveryWhere ID     This is your Care EveryWhere ID. This could be used by other organizations to access your [facility] medical records  [id number]        Your Vitals Were     Pulse Height                85 1.651 m (5' 5\")           Blood Pressure from Last 3 Encounters:   05/02/17 103/70   04/26/17 156/87   01/13/17 149/85    Weight from Last 3 Encounters:   07/12/16 91.4 kg (201 lb 9.6 oz)   06/17/16 90.3 kg (199 lb)   06/17/16 90.3 kg (199 lb)                 Today's Medication Changes          These changes are accurate as of: 5/2/17  4:40 PM.  If you have any questions, ask your nurse or doctor.               These medicines have changed or have updated prescriptions.        Dose/Directions    levothyroxine 112 MCG tablet   Commonly known as:  SYNTHROID/LEVOTHROID   This may have changed:  additional instructions   Used for:  Postsurgical hypothyroidism, Papillary carcinoma, follicular variant (H), Postsurgical hypoparathyroidism (H), Thyroid cancer (H)        Monday-Saturday: (2 tablet/day);  Sunday: (3 tablet)   Quantity:  189 tablet   Refills:  3       vitamin D3 2000 UNITS Caps   This may have changed:  how much to take   Used for:  Thyroid cancer (H), Postsurgical hypothyroidism, Papillary carcinoma, follicular variant (H), " Postsurgical hypoparathyroidism (H)        Dose:  49938 Units   Take 10,000 Units by mouth daily   Quantity:  60 capsule   Refills:  4            Where to get your medicines      Some of these will need a paper prescription and others can be bought over the counter.  Ask your nurse if you have questions.     Bring a paper prescription for each of these medications     levothyroxine 112 MCG tablet                Primary Care Provider Office Phone # Fax #    Chandni Orona [phone]       [address] [address]        Thank you!     Thank you for choosing [facility]  for your care. Our goal is always to provide you with excellent care. Hearing back from our patients is one way we can continue to improve our services. Please take a few minutes to complete the written survey that you may receive in the mail after your visit with us. Thank you!             Your Updated Medication List - Protect others around you: Learn how to safely use, store and throw away your medicines at www.disposemymeds.org.          This list is accurate as of: 5/2/17  4:40 PM.  Always use your most recent med list.                   Brand Name Dispense Instructions for use    ACAI PO      Take 1,000 mg by mouth 2 times daily       albuterol 108 (90 BASE) MCG/ACT Inhaler    VENTOLIN HFA    18 g    Inhale 2 puffs into the lungs every 4 hours as needed for shortness of breath / dyspnea or wheezing       aluminum chloride 20 % external solution    DRYSOL    60 mL    Apply topically At Bedtime       AZMACORT IN      Inhale 2 puffs into the lungs as needed       BENADRYL PO      Take 50 mg by mouth as needed       BETAINE HCL PO      Take 2 tablets by mouth as needed Betaine HCL and Pepsin: Take 1-7 tabs by mouth daily, if burning take one tablet less once daily. Betaine HCL 1.04 g Pepsin 40 mg       calcitRIOL 0.25 MCG capsule    ROCALTROL    270 capsule    2 tabs  in am and 1 tab qhs       CALCIUM CITRATE +D 315-250 MG-UNIT Tabs per tablet   Generic drug:  calcium citrate-vitamin D     120 tablet    Take 2 tablets by mouth 3 times daily       celecoxib 200 MG capsule    celeBREX    180 capsule    Take 1 capsule (200 mg) by mouth 2 times daily       Colchicine 0.6 MG Caps     270 capsule    Take 0.6 mg by mouth 3 times daily for costochondritis (chest) discomfort. Scale back use to prn with loose stools or bloating. Dispense Colcrys. Patient cannot use generic due to allergy to dye.       COMPOUND - PHARMACY TO MIX COMPOUNDED MEDICATION    CMPD RX    120 g    Apply small amount to affected area two times daily.Ketamine 6% + ketoprofen 10% + lidocaine 10% in Lipo.       cromolyn 4 % ophthalmic solution    OPTICROM    10 mL    INSTILL ONE DROP INTO BOTH EYES FOUR TIMES DAILY       cromolyn sodium 5.2 MG/ACT Aers Inhaler    [facility] CROMOLYN SODIUM    1 Bottle    Spray 1 spray (1 mL) in nostril daily       cyclobenzaprine 10 MG tablet    FLEXERIL     Take 10 mg by mouth 3 times daily as needed On hold Dr Monsalve       diazepam 5 MG tablet    VALIUM    90 tablet    Take 1 tablet (5 mg) by mouth 3 times daily as needed For muscle spasms       diclofenac 1 % Gel topical gel    VOLTAREN    100 g    Apply affected area two times daily PRN using enclosed dosing card.       docusate sodium 100 MG tablet    COLACE    60 tablet    Take 100 mg by mouth daily       EPINEPHrine 0.3 MG/0.3ML injection    EPIPEN 2-CARIDAD    2 each    Inject 0.3 mLs (0.3 mg) into the muscle once as needed for anaphylaxis       hydrochlorothiazide 12.5 MG capsule    MICROZIDE    90 capsule    Take 1 capsule (12.5 mg) by mouth daily       levothyroxine 112 MCG tablet    SYNTHROID/LEVOTHROID    189 tablet    Monday-Saturday: (2 tablet/day);  Sunday: (3 tablet)       lidocaine 5 % ointment    XYLOCAINE    35.44 g    SANJANA TOPICALLY QID PRN       Lidocaine HCl Monohydrate Powd          MAGNESIUM CITRATE PO      Take 400 mg by  mouth daily       methocarbamol 750 MG tablet    ROBAXIN    360 tablet    Take 1 tablet (750 mg) by mouth 4 times daily as needed . Ok to take a 5th Robaxin on very severe days.       mometasone 110 MCG/INH inhaler    ASMANEX 30 METERED DOSES    3 Inhaler    Inhale 1 puff into the lungs daily       montelukast 10 MG tablet    SINGULAIR    120 tablet    TAKE TWO TABLETS BY MOUTH TWICE DAILY       * morphine 15 MG 12 hr tablet    MS CONTIN    30 tablet    1 tab (15mg) at bedtime.       * morphine 30 MG 12 hr tablet    MS CONTIN    60 tablet    1 tab (30mg) in the morning and in the afternoon + 15mg at bedtime       OMEGA 3 PO      Take 5 mLs by mouth       ondansetron 4 MG ODT tab    ZOFRAN ODT    60 tablet    Take 1-2 tablets (4-8 mg) by mouth every 8 hours as needed for nausea or vomiting       * order for DME     2 Units    Equipment being ordered: compression stockings. 20-30 mm or 30 - 40 mm as patient can tolerate. Physical therapy to determine if they should be above or below the knee.       * order for DME     2 Device    Equipment being ordered: compression bra       polyethylene glycol powder    MIRALAX    510 g    Take 17 g by mouth daily       potassium chloride SA 20 MEQ CR tablet    potassium chloride    90 tablet    Take 1 tablet (20 mEq) by mouth daily       PROBIOTIC DAILY PO      Take 1 capsule by mouth daily Lacto acid bifidobacterium       ranitidine 75 MG tablet    ZANTAC    270 tablet    Take 1 tablet (75 mg) by mouth 3 times daily       SUMAtriptan 50 MG tablet    IMITREX    5 tablet    Take 1 tablet (50 mg) by mouth at onset of headache for migraine (may repeat in 2 hours as needed, max 2 tablets daily)       tacrolimus 0.1 % ointment    PROTOPIC    60 g    Apply topically 2 times daily       tamoxifen 20 MG tablet    NOLVADEX    90 tablet    Take 1 tablet (20 mg) by mouth daily       triamcinolone 0.025 % ointment    KENALOG    80 g    Apply topically 2 times daily Mix with 1 lb jar of  vaseline or aquaphor       TUMS 500 MG chewable tablet   Generic drug:  calcium carbonate     180 chew tab    Take 2 tablets (1,000 mg) by mouth nightly as needed for other (cramps)       * UNABLE TO FIND      1 tablet 3 times daily MEDICATION NAME: calcium D-Glucarate       * UNABLE TO FIND      2 tablets 3 times daily MEDICATION NAME: Natural D-Hist       * UNABLE TO FIND      MEDICATION NAME: Tumeric 3 capsules daily       * UNABLE TO FIND      1 tablet 3 times daily MEDICATION NAME: Digestzymes       * UNABLE TO FIND      1 tablet daily MEDICATION NAME: Pure Encapsulations       vitamin D3 2000 UNITS Caps     60 capsule    Take 10,000 Units by mouth daily       ZYRTEC ALLERGY 10 MG tablet   Generic drug:  cetirizine     90 tablet    Take 1 tablet (10 mg) by mouth 3 times daily On hold for lab test.       * Notice:  This list has 9 medication(s) that are the same as other medications prescribed for you. Read the directions carefully, and ask your doctor or other care provider to review them with you.

## 2017-05-08 DIAGNOSIS — D89.42 IDIOPATHIC MAST CELL ACTIVATION SYNDROME (H): ICD-10-CM

## 2017-05-08 NOTE — TELEPHONE ENCOUNTER
Cromolyn 4% ophthalmic solution      Last Written Prescription Date: 4/19/17  Last Fill Quantity: 10 ml,  # refills: 0   Last Office Visit with [facility], [facility] or [facility] prescribing provider: 12/20/16

## 2017-05-09 ENCOUNTER — TELEPHONE (OUTPATIENT)
Dept: PALLIATIVE CARE | Facility: CLINIC | Age: 57
End: 2017-05-09

## 2017-05-09 RX ORDER — CROMOLYN SODIUM 40 MG/ML
SOLUTION/ DROPS OPHTHALMIC
Qty: 10 ML | Refills: 0 | Status: SHIPPED | OUTPATIENT
Start: 2017-05-09 | End: 2017-05-30

## 2017-05-09 NOTE — TELEPHONE ENCOUNTER
----- Message from Elvira Davis RN sent at 5/5/2017  3:15 PM CDT -----  [facility] in [place] wanted you to be aware pt [facility] a dentist [address]. She got them one time previously as well.

## 2017-05-18 DIAGNOSIS — R07.89 CHEST WALL PAIN: ICD-10-CM

## 2017-05-18 NOTE — TELEPHONE ENCOUNTER
Received VM from patient requesting refills if her medications as she is having to change pharmacies due to an insurance change. Called her back to discuss and reviewed her meds. She is trying to be proactive due to not being sure which pharmacy she will use and only needs MS Contin scripts at this time. We discussed that these scripts will be forward dated, which she verbalized she is okay with and understands.     Last refill: 4/226/2017 per epic -- 5/8/2017 dispense date per [place]   Last office visit: 4/26/207  Scheduled for follow up 7/12/2017     Will route request to MD for review. Approved scripts should be locked in lock box on first floor.     Reviewed [place]  Report.

## 2017-05-19 RX ORDER — MORPHINE SULFATE 30 MG/1
TABLET, FILM COATED, EXTENDED RELEASE ORAL
Qty: 60 TABLET | Refills: 0 | Status: SHIPPED | OUTPATIENT
Start: 2017-06-05 | End: 2017-06-22

## 2017-05-19 RX ORDER — MORPHINE SULFATE 15 MG/1
TABLET, FILM COATED, EXTENDED RELEASE ORAL
Qty: 30 TABLET | Refills: 0 | Status: SHIPPED | OUTPATIENT
Start: 2017-06-05 | End: 2017-06-22

## 2017-05-19 NOTE — TELEPHONE ENCOUNTER
Received VM from patient stating her bottles of MS Contin say they were last filled on the 24th. Reverified with [place]  report and called [facility] to confirm when MS Contin was last picked up. Pablo at pharmacy advised patient last picked up MS Contin scripts at on 5/8/2017 - which matches  report.     Unable to reach patient to discuss after several attempts -- work phone # busy and cell phone not answered. Will try again later.

## 2017-05-23 ENCOUNTER — MYC MEDICAL ADVICE (OUTPATIENT)
Dept: PALLIATIVE CARE | Facility: CLINIC | Age: 57
End: 2017-05-23

## 2017-05-23 DIAGNOSIS — M79.18 MYOFASCIAL PAIN: ICD-10-CM

## 2017-05-23 DIAGNOSIS — R07.89 CHEST WALL PAIN: Primary | ICD-10-CM

## 2017-05-30 DIAGNOSIS — D89.42 IDIOPATHIC MAST CELL ACTIVATION SYNDROME (H): ICD-10-CM

## 2017-05-30 RX ORDER — CROMOLYN SODIUM 40 MG/ML
SOLUTION/ DROPS OPHTHALMIC
Qty: 10 ML | Refills: 0 | Status: SHIPPED | OUTPATIENT
Start: 2017-05-30 | End: 2017-06-22

## 2017-05-30 NOTE — TELEPHONE ENCOUNTER
Cromolyn 4% ophthalmic solution      Last Written Prescription Date: 5/9/17  Last Fill Quantity: 10 ml,  # refills: 0   Last Office Visit with [facility] or [facility] prescribing provider: 12/20/16 with Dr. Avendaño  Next office visit: 7/24/17 with Dr. Crawley

## 2017-06-03 ENCOUNTER — TELEPHONE (OUTPATIENT)
Dept: PALLIATIVE CARE | Facility: CLINIC | Age: 57
End: 2017-06-03

## 2017-06-03 ENCOUNTER — NURSE TRIAGE (OUTPATIENT)
Dept: NURSING | Facility: CLINIC | Age: 57
End: 2017-06-03

## 2017-06-03 NOTE — TELEPHONE ENCOUNTER
"  Reason for Disposition    Pharmacy calling with prescription questions and triager unable to answer question     Pharmacist Natalie from [facility] pharmacy stating pt has presented 2 postdated morphine prescriptions with date of 6/5/2017, calling for permission to fill early.    Additional Information    Negative: Drug overdose and nurse unable to answer question    Negative: Caller requesting information not related to medicine    Negative: Caller requesting a prescription for Strep throat and has a positive culture result    Negative: Rash while taking a medication or within 3 days of stopping it    Negative: Immunization reaction suspected    Negative: [1] Asthma and [2] having symptoms of asthma (cough, wheezing, etc)    Negative: MORE THAN A DOUBLE DOSE of a prescription or over-the-counter (OTC) drug    Negative: [1] DOUBLE DOSE (an extra dose or lesser amount) of over-the-counter (OTC) drug AND [2] any symptoms (e.g., dizziness, nausea, pain, sleepiness)    Negative: [1] DOUBLE DOSE (an extra dose or lesser amount) of prescription drug AND [2] any symptoms (e.g., dizziness, nausea, pain, sleepiness)    Negative: Took another person's prescription drug    Negative: [1] DOUBLE DOSE (an extra dose or lesser amount) of prescription drug AND [2] NO symptoms (Exception: a double dose of antibiotics)    Negative: Diabetes drug error or overdose (e.g., insulin or extra dose)    Negative: [1] Request for URGENT new prescription or refill of \"essential\" medication (i.e., likelihood of harm to patient if not taken) AND [2] triager unable to fill per unit policy    Negative: [1] Prescription not at pharmacy AND [2] was prescribed today by PCP    Protocols used: MEDICATION QUESTION CALL-ADULT-    "

## 2017-06-04 NOTE — TELEPHONE ENCOUNTER
Pharmacy called me  She wanted to fill morphine Sat 6/3  Last filled 4/8  Rx for 6/5     I said No

## 2017-06-16 DIAGNOSIS — D47.02 MAST CELL DISEASE, SYSTEMIC: Chronic | ICD-10-CM

## 2017-06-16 RX ORDER — CROMOLYN SODIUM 5.2 MG
AEROSOL, SPRAY WITH PUMP (ML) NASAL
Qty: 1 BOTTLE | Refills: 6 | Status: SHIPPED | OUTPATIENT
Start: 2017-06-16 | End: 2017-12-13

## 2017-06-16 NOTE — TELEPHONE ENCOUNTER
Love cardenas      Last Written Prescription Date: 4/15/16  Last Fill Quantity: 1 bottle,  # refills: 6   Last Office Visit with G, P or [facility] prescribing provider: 12/20/16 with Dr. Avendaño    Pt cancelled 3/27/17 appointment, no future appts scheduled with provider

## 2017-06-21 DIAGNOSIS — J45.20 MILD INTERMITTENT ASTHMA WITHOUT COMPLICATION: ICD-10-CM

## 2017-06-22 DIAGNOSIS — D89.42 IDIOPATHIC MAST CELL ACTIVATION SYNDROME (H): ICD-10-CM

## 2017-06-22 DIAGNOSIS — R07.89 CHEST WALL PAIN: ICD-10-CM

## 2017-06-22 DIAGNOSIS — E87.6 HYPOKALEMIA: ICD-10-CM

## 2017-06-22 RX ORDER — ALBUTEROL SULFATE 90 UG/1
AEROSOL, METERED RESPIRATORY (INHALATION)
Qty: 18 G | Refills: 3 | Status: SHIPPED | OUTPATIENT
Start: 2017-06-22 | End: 2019-05-14

## 2017-06-22 RX ORDER — CROMOLYN SODIUM 40 MG/ML
1 SOLUTION/ DROPS OPHTHALMIC 4 TIMES DAILY
Qty: 10 ML | Refills: 5 | Status: ON HOLD | OUTPATIENT
Start: 2017-06-22 | End: 2018-01-22

## 2017-06-22 NOTE — TELEPHONE ENCOUNTER
[facility]     Last Written Prescription Date:  7/1/16  Last Fill Quantity: 90,   # refills: 3  Last Office Visit : 5/2/17  Future Office visit:  11/13/17    Routing refill request to provider for review/approval because:  Drug not on the [facility], [facility] or [facility] refill protocol or controlled substance

## 2017-06-22 NOTE — TELEPHONE ENCOUNTER
Cromolyn (Opticrom) 4% ophthalmic solution      Last Written Prescription Date: 5/30/2017  Last Fill Quantity: 1 bottle,  # refills: 0   Last Office Visit with [facility], [facility] or [facility] prescribing provider: 12/20/2016 with Dr. Avendaño

## 2017-06-22 NOTE — TELEPHONE ENCOUNTER
Routing refill request to provider for review/approval because:  Patient needs to be seen because it has been more than 1 year since last office visit.  ACT out of date  AAP out of date      Mary Wilkerson RN - BC

## 2017-06-22 NOTE — TELEPHONE ENCOUNTER
albuterol (VENTOLIN HFA) 108 (90 BASE) MCG/ACT Inhaler       Last Written Prescription Date: 03/27/17  Last Fill Quantity: 18g, # refills: 5    Last Office Visit with G, P or [facility] prescribing provider:  04/04/16   Future Office Visit:       Date of Last Asthma Action Plan Letter:   Asthma Action Plan Q1 Year    Topic Date Due     Asthma Action Plan - yearly  02/26/2014      Asthma Control Test:   ACT Total Scores 8/15/2013   ACT TOTAL SCORE 24   ASTHMA ER VISITS 0 = None   ASTHMA HOSPITALIZATIONS 0 = None       Date of Last Spirometry Test:   No results found for this or any previous visit.        Leilani Duque, CMA

## 2017-06-22 NOTE — PATIENT INSTRUCTIONS
albuterol (VENTOLIN HFA) 108 (90 BASE) MCG/ACT Inhaler       Last Written Prescription Date: 3/27/2017  Last Fill Quantity: 18 g, # refills: 5    Last Office Visit with G, P or [facility] prescribing provider:  4/4/2016   Future Office Visit:       Date of Last Asthma Action Plan Letter:   Asthma Action Plan Q1 Year    Topic Date Due     Asthma Action Plan - yearly  02/26/2014      Asthma Control Test:   ACT Total Scores 8/15/2013   ACT TOTAL SCORE 24   ASTHMA ER VISITS 0 = None   ASTHMA HOSPITALIZATIONS 0 = None       Date of Last Spirometry Test:   No results found for this or any previous visit.

## 2017-06-22 NOTE — TELEPHONE ENCOUNTER
Received call from patient with several requests. She states she called [facility] and was informed her cmpd cream script was out of refills when per her records she did have refills. She also notes she will soon be changing insurance companies to [facility] through [employer] and will have to change pharmacies at that point, she tells me that she was unsure if she would need all new paper scripts for her meds then or not -- she will let me know. Advised her pharmacy should be able to transfer what is on her profile when they are ready.     Called compounding pharmacy and verified refill was on file, they will fill and send to patient.    Per chart review, refill of diazepam on file at pharmacy, confirmed this with pharmacy -- patient should call to  when closer to out.    Last refill morphine: 6/5/2017  Last office visit: 4/26/2017  Scheduled for follow up 7/12/2017    Reviewed [place]  Report.    Patient also notes that her pain has been increased lately -- more spasms in her pec and back muscles. She states it have been this was for the past 1.5-2 months. She wonders if it is weather related and asks if Dr. Benitez has any further recommendations for her.

## 2017-06-23 RX ORDER — MORPHINE SULFATE 15 MG/1
TABLET, FILM COATED, EXTENDED RELEASE ORAL
Qty: 30 TABLET | Refills: 0 | Status: SHIPPED | OUTPATIENT
Start: 2017-07-03 | End: 2017-07-12

## 2017-06-23 RX ORDER — MORPHINE SULFATE 30 MG/1
TABLET, FILM COATED, EXTENDED RELEASE ORAL
Qty: 60 TABLET | Refills: 0 | Status: SHIPPED | OUTPATIENT
Start: 2017-07-03 | End: 2017-07-12

## 2017-06-26 DIAGNOSIS — E89.2 POSTSURGICAL HYPOPARATHYROIDISM (H): Primary | ICD-10-CM

## 2017-06-26 RX ORDER — POTASSIUM CHLORIDE 1500 MG/1
20 TABLET, EXTENDED RELEASE ORAL DAILY
Qty: 90 TABLET | Refills: 3 | Status: SHIPPED | OUTPATIENT
Start: 2017-06-26 | End: 2018-01-09

## 2017-06-30 DIAGNOSIS — D89.42 IDIOPATHIC MAST CELL ACTIVATION SYNDROME (H): Chronic | ICD-10-CM

## 2017-06-30 RX ORDER — MONTELUKAST SODIUM 10 MG/1
TABLET ORAL
Qty: 120 TABLET | Refills: 0 | Status: SHIPPED | OUTPATIENT
Start: 2017-06-30 | End: 2017-07-15

## 2017-06-30 NOTE — TELEPHONE ENCOUNTER
montelukast      Last Written Prescription Date: 1/16/17  Last Fill Quantity: 120,  # refills: 5   Last Office Visit with G, P or [facility] prescribing provider: 12/20/16 with Dr. Avendaño

## 2017-07-12 ENCOUNTER — OFFICE VISIT (OUTPATIENT)
Dept: PALLIATIVE CARE | Facility: CLINIC | Age: 57
End: 2017-07-12
Attending: INTERNAL MEDICINE
Payer: COMMERCIAL

## 2017-07-12 VITALS
BODY MASS INDEX: 31.99 KG/M2 | HEIGHT: 65 IN | HEART RATE: 78 BPM | WEIGHT: 192 LBS | DIASTOLIC BLOOD PRESSURE: 79 MMHG | TEMPERATURE: 97.7 F | RESPIRATION RATE: 16 BRPM | SYSTOLIC BLOOD PRESSURE: 130 MMHG | OXYGEN SATURATION: 100 %

## 2017-07-12 DIAGNOSIS — C73 THYROID CANCER (H): ICD-10-CM

## 2017-07-12 DIAGNOSIS — R07.89 CHEST WALL PAIN: Primary | ICD-10-CM

## 2017-07-12 DIAGNOSIS — C73 PAPILLARY CARCINOMA, FOLLICULAR VARIANT (H): ICD-10-CM

## 2017-07-12 DIAGNOSIS — E89.2 POSTSURGICAL HYPOPARATHYROIDISM (H): ICD-10-CM

## 2017-07-12 DIAGNOSIS — M94.0 COSTAL CHONDRITIS: ICD-10-CM

## 2017-07-12 DIAGNOSIS — M79.18 MYOFASCIAL PAIN: ICD-10-CM

## 2017-07-12 DIAGNOSIS — E89.0 POSTSURGICAL HYPOTHYROIDISM: ICD-10-CM

## 2017-07-12 LAB
T4 FREE SERPL-MCNC: 1.54 NG/DL (ref 0.76–1.46)
TSH SERPL DL<=0.05 MIU/L-ACNC: 0.02 MU/L (ref 0.4–4)

## 2017-07-12 PROCEDURE — 99212 OFFICE O/P EST SF 10 MIN: CPT | Mod: ZF

## 2017-07-12 PROCEDURE — 99214 OFFICE O/P EST MOD 30 MIN: CPT | Mod: ZP | Performed by: INTERNAL MEDICINE

## 2017-07-12 RX ORDER — DIAZEPAM 5 MG
5 TABLET ORAL 3 TIMES DAILY PRN
Qty: 90 TABLET | Refills: 2 | Status: SHIPPED | OUTPATIENT
Start: 2017-07-12 | End: 2017-09-08

## 2017-07-12 RX ORDER — MORPHINE SULFATE 30 MG/1
TABLET, FILM COATED, EXTENDED RELEASE ORAL
Qty: 60 TABLET | Refills: 0 | Status: SHIPPED | OUTPATIENT
Start: 2017-08-01 | End: 2017-08-17

## 2017-07-12 RX ORDER — METHOCARBAMOL 750 MG/1
750 TABLET, FILM COATED ORAL 4 TIMES DAILY PRN
Qty: 360 TABLET | Refills: 1 | Status: ON HOLD | OUTPATIENT
Start: 2017-07-12 | End: 2017-10-11

## 2017-07-12 RX ORDER — EPINEPHRINE 0.3 MG/.3ML
0.3 INJECTION SUBCUTANEOUS
Qty: 0.6 ML | Refills: 3 | Status: SHIPPED | OUTPATIENT
Start: 2017-07-12 | End: 2018-07-10

## 2017-07-12 RX ORDER — MORPHINE SULFATE 15 MG/1
TABLET, FILM COATED, EXTENDED RELEASE ORAL
Qty: 30 TABLET | Refills: 0 | Status: SHIPPED | OUTPATIENT
Start: 2017-07-22 | End: 2017-07-12

## 2017-07-12 RX ORDER — MORPHINE SULFATE 30 MG/1
TABLET, FILM COATED, EXTENDED RELEASE ORAL
Qty: 60 TABLET | Refills: 0 | Status: SHIPPED | OUTPATIENT
Start: 2017-07-12 | End: 2017-07-12

## 2017-07-12 RX ORDER — COLCHICINE 0.6 MG/1
0.6 CAPSULE ORAL 3 TIMES DAILY
Qty: 270 CAPSULE | Refills: 1 | Status: ON HOLD | OUTPATIENT
Start: 2017-07-12 | End: 2017-10-11

## 2017-07-12 RX ORDER — CHLORHEXIDINE GLUCONATE ORAL RINSE 1.2 MG/ML
SOLUTION DENTAL
Refills: 0 | Status: ON HOLD | COMMUNITY
Start: 2017-05-15 | End: 2017-10-30

## 2017-07-12 RX ORDER — HYDROCODONE BITARTRATE AND ACETAMINOPHEN 5; 325 MG/1; MG/1
TABLET ORAL
Refills: 0 | COMMUNITY
Start: 2017-05-17 | End: 2017-09-20

## 2017-07-12 RX ORDER — MORPHINE SULFATE 15 MG/1
TABLET, FILM COATED, EXTENDED RELEASE ORAL
Qty: 30 TABLET | Refills: 0 | Status: SHIPPED | OUTPATIENT
Start: 2017-08-01 | End: 2017-08-17

## 2017-07-12 ASSESSMENT — PAIN SCALES - GENERAL: PAINLEVEL: EXTREME PAIN (8)

## 2017-07-12 NOTE — LETTER
7/12/2017       RE: Tisha Arias  [address] [place] [address]     Dear Colleague,    Thank you for referring your patient, Tisha Arias, to the [facility] at [facility]. Please see a copy of my visit note below.    Palliative Staff/Outpatient Clinic    CC/Patient ID:  Patient ID: chronic complex myofascial chest wall pain after breast cancer surgery and reconstruction   Saw Dr England (at my insistence) earlier in 2016 for a 2nd opinion  - he recommended modest opioid increases which we did and it only seemed to improve her situation temporarily  Mood disturbance in this context   Follows closely also with acupuncture, massage, psychotherapy.   Is diagnosed also with MCAS and follows with Dr Avendaño  Thyroid cancer s/p resection, ablations    History:  Referral about the patient's situation is stable. She needs to report moderate and at times severe tightening chest wall pain that feels like two meat hooks into her chest. She continues on her stable medication regimen of MS Contin, Robaxin, colchicine, celecoxib, ketamine and lidocaine-based chills, Voltaren gel. She continues with her acupuncture and myofascial massage of the right now she is in between massage therapists. She continues with her psychotherapy.    Her pain remained severe but she is managing her life. 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 She comes ome at night and relaxes and then sleeps and that is pretty much her life. She is exhausted on the weekends. She wonders if this is the best her pain is going to be and what other options there are. I reviewed everything we've tried and discussed in the past. I offered another pain opinion. She is going to think about that.    She denies significant side effects and feels like she is cognitively intact. She intimately has bowel problems which she relates to her allergies and on her  "meds-diarrhea.    She's frustrated and demoralized by this but denies being depressed.    SH:   Reviewed and unchanged    ROS:  Comprehensive   review of systems is negative except as above     PE: There were no vitals taken for this visit.  /79 (BP Location: Right arm, Patient Position: Chair, Cuff Size: Adult Large)  Pulse 78  Temp 97.7  F (36.5  C) (Oral)  Resp 16  Ht 1.651 m (5' 5\")  Wt 87.1 kg (192 lb)  SpO2 100%  BMI 31.95 kg/m2  Alert pleasant NAD  Well groomed; affect full, clear sensorium, normal memory and thought processes    Impression & Recommendations:  56-year-old woman with a complex chest wall pain after breast cancer surgery and reconstruction. I did not change her pain medication today. I offered her another second opinion. I reflected with her that basically no one can work 12 hours days 5 days a week and not feel exhausted and achy and I'm not sure that that's pathological and related to her pain that she feels that way. Follow-up in 3 months     -The above was transcribed using voice recognition software. While I review and edit the transcription, I may miss errors. Please let me know of any serious mis-transcriptions and I will addend this note.    Chart data reviewed today:    Family History   Problem Relation Age of Onset     Allergies Mother      Arthritis Mother      CANCER Mother      uterine/uterine     Hypertension Mother      on HCTZ     Hyperlipidemia Mother      CEREBROVASCULAR DISEASE Mother      s/p brain surgery     Breast Cancer Mother      Depression Mother      Anxiety Disorder Mother      Anesthesia Reaction Mother      Asthma Mother      Skin Cancer Mother      HEART DISEASE Father      DIABETES Father      Coronary Artery Disease Father      Hypertension Father      Hyperlipidemia Father      CEREBROVASCULAR DISEASE Father      Multiple strokes     Obesity Father      DIABETES Brother      Depression Brother      Hypertension Brother      CANCER Brother 57     " pancreatic cancer     CANCER Maternal Grandfather      pancreatic cancer/stomach cancer     Prostate Cancer Maternal Grandfather      Prostrate and Bladder     Other Cancer Maternal Grandfather      Pancreatic 1988, Bladder 1977     CANCER Maternal Grandmother      uterine     Breast Cancer Maternal Grandmother      Skin Cancer Maternal Grandmother      DIABETES Brother      Breast Cancer Cousin      Grand mothers sister     Other Cancer Brother      Stage 3 Pancreatic 2-2015     Depression Brother      Asthma Brother      Obesity Brother      Anesthesia Reaction Other      H/a, itching, drug interactions     Asthma Other      Thyroid Disease No family hx of      CANCER Brother      Pancreatic      Past Medical History:   Diagnosis Date     Allergic rhinitis due to animal dander      Asthma      Breast cancer (H) 1/30/12    right     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 No real need for Epipen for this alone.     GDM (gestational diabetes mellitus)     w first pregnancy only     Gestational hypertension      Lymphedema     jackson arms, chest     Migraine      Neuropathy associated with cancer (H)      Papillary carcinoma, follicular variant (H) 6/28/2013    pT3, N1, Mx, thyroid     Post-surgical hypothyroidism      Renal disease     kidney stones     Rhinitis, allergic to other allergen      Right Breast mass and microcalcifications 12/13/2011     Seasonal allergic conjunctivitis      Seasonal allergic rhinitis 4/12/11 skin tests pos. for: dog/M/T/G/W--NEGATIVE FOOD TESTS FOR: shrimp, crab, lobster, coconut     Past Surgical History:   Procedure Laterality Date     BACK SURGERY  2000,    Bulging disc w nerve root impigment     BIOPSY BREAST  1989    right     BIOPSY BREAST  12/13/11    right, core and sterotactic     BYPASS GASTRIC,  CHOLECYSTECTOMY, COMBINED       C LAPAROSCOPIC GASTRIC RESTRICTIVE PX, W/GASTRIC BYPASS/ GAMALIEL-EN-Y, < 150CM      Gamaliel en Y?      SECTION       COLONOSCOPY      normal     COSMETIC SURGERY  2012    Final Stage of Mastectomy     DAVINCI HYSTERECTOMY TOTAL, BILATERAL SALPINGO-OOPHORECTOMY, COMBINED  2012    Procedure: COMBINED DAVINCI HYSTERECTOMY TOTAL, SALPINGO-OOPHORECTOMY;  Davinci Total Laparoscopic Hysterectomy, Bilateral Salpingo Oophorectomy, Pelvic Washings, Cystoscopy;  Surgeon: Evie Sheikh MD;  Location: [facility] OR     ENT SURGERY  1982     GI SURGERY  2007    Gamaliel-en Y w cholecystectomy     GYN SURGERY  89,1/10/92    2 c-sections     HYSTERECTOMY, PAP NO LONGER INDICATED      DeVinci assister lap hyst with BSO     IRRIGATION AND DEBRIDEMENT BREAST  3/1/2012    Procedure:IRRIGATION AND DEBRIDEMENT BREAST; Irrigation and Debridement, Wound Closure Right Breast; Surgeon:JUNG GUILLEN; Location:[facility] OR     MASTECTOMY, RECONSTRUCT BREAST, COMBINED  2012    Procedure:COMBINED MASTECTOMY, RECONSTRUCT BREAST; Bilateral Mastectomies, Right Axillary [place] Node Biopsy, Bilateral Breast Reconstruction with Tissue Expanders, Reconstruction of inframammary fold, bilateral pain management systems; Surgeon:ANUPAM CAMPBELL; Location:[facility] OR     RECONSTRUCT BREAST  2012    Procedure: RECONSTRUCT BREAST;  Bilateral 2nd stage breast reconstruction, revision,       SOFT TISSUE SURGERY  12    Mastectomy w severe myofascial pain syndrome     THYROIDECTOMY  7/10/2013    Procedure: THYROIDECTOMY;  Total Thyroidectomy with central neck dissection;  Surgeon: Indiana Sneed MD;  Location: [facility] OR     TONSILLECTOMY      childhood     Allergies   Allergen Reactions     No Clinical Screening - See Comments Shortness Of Breath, Palpitations, Anaphylaxis, Itching, Swelling, Difficulty breathing and Rash     Sukhjinder wipes- oral allergy -  2015: throat tightness from  a Chinese herbal medicine Wilmer Tran     Amitriptyline Hcl Swelling     Baclofen      Birch Trees      Potatoes, carrots, cherries, celery, apple, pears, plums, peaches, parsnip, kiwi, hazelnuts, and apricots,      Blue Dyes      Headaches       Cymbalta Other (See Comments)     Flushing, tremor/muscle twitching and edema     Gabapentin      edema     Grass      Mugwort [Artemisia Vulgaris]      Various spices     Nortriptyline Itching, Visual Disturbance, Swelling and GI Disturbance     Ragweeds      Melons, bananas, cucumbers, zucchini.     Topamax      Serotonin Anxiety and Swelling          Medications:   I have reviewed this patient's medication profile.  MNPMP: reviewed      Data reviewed:  I reviewed recent labs and imaging, comments on pertinent findings:  Cr 0.8    Thank you for involving us in the patient's care.   Edu Benitez MD / Palliative Medicine / Pager [phone] / [facility] Inpatient Team Consult Pager [phone] (answered 8am-430pm M-F) - ok to text page via [employer] / After-Hours Answering Service [phone] / Palliative Clinic in the [facility] [place] [facility] [place] [phone] (scheduling); [phone] (triage).

## 2017-07-12 NOTE — PROGRESS NOTES
Palliative Staff/Outpatient Clinic    CC/Patient ID:  Patient ID: chronic complex myofascial chest wall pain after breast cancer surgery and reconstruction   Saw Dr England (at my insistence) earlier in 2016 for a 2nd opinion - he recommended modest opioid increases which we did and it only seemed to improve her situation temporarily  Mood disturbance in this context   Follows closely also with acupuncture, massage, psychotherapy.   Is diagnosed also with MCAS and follows with Dr Avendaño  Thyroid cancer s/p resection, ablations    History:  Referral about the patient's situation is stable. She needs to report moderate and at times severe tightening chest wall pain that feels like two meat hooks into her chest. She continues on her stable medication regimen of MS Contin, Robaxin, colchicine, celecoxib, ketamine and lidocaine-based chills, Voltaren gel. She continues with her acupuncture and myofascial massage of the right now she is in between massage therapists. She continues with her psychotherapy.    Her pain remained severe but she is managing her life. 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 She comes ome at night and relaxes and then sleeps and that is pretty much her life. She is exhausted on the weekends. She wonders if this is the best her pain is going to be and what other options there are. I reviewed everything we've tried and discussed in the past. I offered another pain opinion. She is going to think about that.    She denies significant side effects and feels like she is cognitively intact. She intimately has bowel problems which she relates to her allergies and on her meds-diarrhea.    She's frustrated and demoralized by this but denies being depressed.    SH:   Reviewed and unchanged    ROS:  Comprehensive   review of systems is negative except as above     PE: There were no vitals taken for this visit.  /79 (BP Location: Right arm, Patient Position: Chair,  "Cuff Size: Adult Large)  Pulse 78  Temp 97.7  F (36.5  C) (Oral)  Resp 16  Ht 1.651 m (5' 5\")  Wt 87.1 kg (192 lb)  SpO2 100%  BMI 31.95 kg/m2  Alert pleasant NAD  Well groomed; affect full, clear sensorium, normal memory and thought processes    Impression & Recommendations:  56-year-old woman with a complex chest wall pain after breast cancer surgery and reconstruction. I did not change her pain medication today. I offered her another second opinion. I reflected with her that basically no one can work 12 hours days 5 days a week and not feel exhausted and achy and I'm not sure that that's pathological and related to her pain that she feels that way. Follow-up in 3 months     -The above was transcribed using voice recognition software. While I review and edit the transcription, I may miss errors. Please let me know of any serious mis-transcriptions and I will addend this note.    Chart data reviewed today:    Family History   Problem Relation Age of Onset     Allergies Mother      Arthritis Mother      CANCER Mother      uterine/uterine     Hypertension Mother      on HCTZ     Hyperlipidemia Mother      CEREBROVASCULAR DISEASE Mother      s/p brain surgery     Breast Cancer Mother      Depression Mother      Anxiety Disorder Mother      Anesthesia Reaction Mother      Asthma Mother      Skin Cancer Mother      HEART DISEASE Father      DIABETES Father      Coronary Artery Disease Father      Hypertension Father      Hyperlipidemia Father      CEREBROVASCULAR DISEASE Father      Multiple strokes     Obesity Father      DIABETES Brother      Depression Brother      Hypertension Brother      CANCER Brother 57     pancreatic cancer     CANCER Maternal Grandfather      pancreatic cancer/stomach cancer     Prostate Cancer Maternal Grandfather      Prostrate and Bladder     Other Cancer Maternal Grandfather      Pancreatic 1988, Bladder 1977     CANCER Maternal Grandmother      uterine     Breast Cancer Maternal " Grandmother      Skin Cancer Maternal Grandmother      DIABETES Brother      Breast Cancer Cousin      Grand mothers sister     Other Cancer Brother      Stage 3 Pancreatic 2-     Depression Brother      Asthma Brother      Obesity Brother      Anesthesia Reaction Other      H/a, itching, drug interactions     Asthma Other      Thyroid Disease No family hx of      CANCER Brother      Pancreatic      Past Medical History:   Diagnosis Date     Allergic rhinitis due to animal dander      Asthma      Breast cancer (H) 12    right     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 No real need for Epipen for this alone.     GDM (gestational diabetes mellitus)     w first pregnancy only     Gestational hypertension      Lymphedema     jackson arms, chest     Migraine      Neuropathy associated with cancer (H)      Papillary carcinoma, follicular variant (H) 2013    pT3, N1, Mx, thyroid     Post-surgical hypothyroidism      Renal disease     kidney stones     Rhinitis, allergic to other allergen      Right Breast mass and microcalcifications 2011     Seasonal allergic conjunctivitis      Seasonal allergic rhinitis 11 skin tests pos. for: dog/M/T/G/W--NEGATIVE FOOD TESTS FOR: shrimp, crab, lobster, coconut     Past Surgical History:   Procedure Laterality Date     BACK SURGERY  ,    Bulging disc w nerve root impigment     BIOPSY BREAST      right     BIOPSY BREAST  11    right, core and sterotactic     BYPASS GASTRIC, CHOLECYSTECTOMY, COMBINED       C LAPAROSCOPIC GASTRIC RESTRICTIVE PX, W/GASTRIC BYPASS/ GAMALIEL-EN-Y, < 150CM      Gamaliel en Y?      SECTION       COLONOSCOPY      normal     COSMETIC SURGERY  2012    Final Stage of Mastectomy     DAVINCI HYSTERECTOMY TOTAL, BILATERAL  SALPINGO-OOPHORECTOMY, COMBINED  12/12/2012    Procedure: COMBINED DAVINCI HYSTERECTOMY TOTAL, SALPINGO-OOPHORECTOMY;  Davinci Total Laparoscopic Hysterectomy, Bilateral Salpingo Oophorectomy, Pelvic Washings, Cystoscopy;  Surgeon: Evie Sheikh MD;  Location: [facility] OR     ENT SURGERY  1982     GI SURGERY  11-    Rachael-en Y w cholecystectomy     GYN SURGERY  1/6/89,1/10/92    2 c-sections     HYSTERECTOMY, PAP NO LONGER INDICATED  12/12    DeVinci assister lap hyst with BSO     IRRIGATION AND DEBRIDEMENT BREAST  3/1/2012    Procedure:IRRIGATION AND DEBRIDEMENT BREAST; Irrigation and Debridement, Wound Closure Right Breast; Surgeon:JUNG GUILLEN; Location:[facility] OR     MASTECTOMY, RECONSTRUCT BREAST, COMBINED  1/30/2012    Procedure:COMBINED MASTECTOMY, RECONSTRUCT BREAST; Bilateral Mastectomies, Right Axillary [place] Node Biopsy, Bilateral Breast Reconstruction with Tissue Expanders, Reconstruction of inframammary fold, bilateral pain management systems; Surgeon:ANUPAM CAMPBELL; Location:[facility] OR     RECONSTRUCT BREAST  8/31/2012    Procedure: RECONSTRUCT BREAST;  Bilateral 2nd stage breast reconstruction, revision,       SOFT TISSUE SURGERY  1-30-12    Mastectomy w severe myofascial pain syndrome     THYROIDECTOMY  7/10/2013    Procedure: THYROIDECTOMY;  Total Thyroidectomy with central neck dissection;  Surgeon: Indiana Sneed MD;  Location: [facility] OR     TONSILLECTOMY      childhood     Allergies   Allergen Reactions     No Clinical Screening - See Comments Shortness Of Breath, Palpitations, Anaphylaxis, Itching, Swelling, Difficulty breathing and Rash     Sukhjinder wipes- oral allergy -  July 2015: throat tightness from a Chinese herbal medicine Wilmer Tran     Amitriptyline Hcl Swelling     Baclofen      Birch Trees      Potatoes, carrots, cherries, celery, apple, pears, plums, peaches, parsnip, kiwi, hazelnuts, and apricots,      Blue Dyes      Headaches       Cymbalta Other (See Comments)      Flushing, tremor/muscle twitching and edema     Gabapentin      edema     Grass      Mugwort [Artemisia Vulgaris]      Various spices     Nortriptyline Itching, Visual Disturbance, Swelling and GI Disturbance     Ragweeds      Melons, bananas, cucumbers, zucchini.     Topamax      Serotonin Anxiety and Swelling          Medications:   I have reviewed this patient's medication profile.  MNPMP: reviewed      Data reviewed:  I reviewed recent labs and imaging, comments on pertinent findings:  Cr 0.8    Thank you for involving us in the patient's care.   Edu Benitez MD / Palliative Medicine / Pager [phone] / [facility] Inpatient Team Consult Pager [phone] (answered 8am-430pm M-F) - ok to text page via [employer] / After-Hours Answering Service [phone] / Palliative Clinic in the [facility] at the [facility] - [phone] (scheduling); [phone] (triage).

## 2017-07-12 NOTE — NURSING NOTE
"Oncology Rooming Note    July 12, 2017 7:14 AM   Tisha Arias is a 56 year old female who presents for:    Chief Complaint   Patient presents with     Oncology Clinic Visit     Return- 3 month f/u     Initial Vitals: /79 (BP Location: Right arm, Patient Position: Chair, Cuff Size: Adult Large)  Pulse 78  Temp 97.7  F (36.5  C) (Oral)  Resp 16  Ht 1.651 m (5' 5\")  Wt 87.1 kg (192 lb)  SpO2 100%  BMI 31.95 kg/m2 Estimated body mass index is 31.95 kg/(m^2) as calculated from the following:    Height as of this encounter: 1.651 m (5' 5\").    Weight as of this encounter: 87.1 kg (192 lb). Body surface area is 2 meters squared.  Extreme Pain (8) Comment: chest, head, back   No LMP recorded. Patient is postmenopausal.  Allergies reviewed: Yes  Medications reviewed: Yes    Medications: MEDICATION REFILLS NEEDED TODAY. Provider was notified.  Pharmacy name entered into [facility]:    [facility] [address] [place] [address] - [address] [place] [address] [address] AT SEC OF [facility] [address] [place] [address] PHARMACY SERVICES - [place], [place] - [facility] [address], [address]    Clinical concerns: Patient needs refills on Zofran, Nasalcrom, Robaxin, epi pen, singulair    7 minutes for nursing intake (face to face time)     Madeleine Dowling LPN            "

## 2017-07-12 NOTE — MR AVS SNAPSHOT
After Visit Summary   7/12/2017    Tisha Arias    MRN: [id number]           Patient Information     Date Of Birth          1960        Visit Information        Provider Department      7/12/2017 7:00 AM Edu Benitez MD [facility]        Today's Diagnoses     Chest wall pain    -  1    Costal chondritis        Myofascial pain           Follow-ups after your visit        Your next 10 appointments already scheduled     Jul 24, 2017  4:30 PM CDT   (Arrive by 4:15 PM)   Return Visit with Shahla Crawley MD   [facility] ([facility])    [address] [place] [address] [id number] [phone]            Nov 02, 2017  5:00 PM CDT   US HEAD NECK SOFT TISSUE with UCUS3   [facility] ([facility])    [address] [place] [address] [id number] [phone]           Please bring a list of your medicines (including vitamins, minerals and over-the-counter drugs). Also, tell your doctor about any allergies you may have. Wear comfortable clothes and leave your valuables at home.  You do not need to do anything special to prepare for your exam.  Please call the Imaging Department at your exam site with any questions.            Nov 02, 2017  6:00 PM CDT   LAB with  LAB   [facility] ([facility])    [address] [place] [address] [id number] [phone]           Patient must bring picture ID.  Patient should be prepared to give a urine specimen  Please do not eat 10-12 hours before your appointment if you are coming in fasting for labs on lipids, cholesterol, or glucose (sugar).  Pregnant women should follow their Care Team instructions. Water with medications is okay. Do not drink coffee or other fluids.   If you have concerns about taking  your medications, please ask at office or if scheduling via [email or web address],  "send a message by clicking on Secure Messaging, Message Your Care Team.            Nov 13, 2017  4:30 PM CST   (Arrive by 4:15 PM)   RETURN ENDOCRINE with Avelina Castro MD   [facility] ([facility])    [address] [id number] [phone]              Who to contact     If you have questions or need follow up information about today's clinic visit or your schedule please contact [facility] directly at [phone].  Normal or non-critical lab and imaging results will be communicated to you by [employer], letter or phone within 4 business days after the clinic has received the results. If you do not hear from us within 7 days, please contact the clinic through [employer] or phone. If you have a critical or abnormal lab result, we will notify you by phone as soon as possible.  Submit refill requests through [employer] or call your pharmacy and they will forward the refill request to us. Please allow 3 business days for your refill to be completed.          Additional Information About Your Visit        [employer] Information     [employer] gives you secure access to your electronic health record. If you see a primary care provider, you can also send messages to your care team and make appointments. If you have questions, please call your primary care clinic.  If you do not have a primary care provider, please call [phone] and they will assist you.        Care EveryWhere ID     This is your Care EveryWhere ID. This could be used by other organizations to access your [facility] medical records  [id number]        Your Vitals Were     Pulse Temperature Respirations Height Pulse Oximetry BMI (Body Mass Index)    78 97.7  F (36.5  C) (Oral) 16 1.651 m (5' 5\") 100% 31.95 kg/m2       Blood Pressure from Last 3 Encounters:   07/12/17 130/79   05/02/17 103/70   04/26/17 156/87    Weight from Last 3 Encounters:   07/12/17 87.1 kg (192 lb) "   07/12/16 91.4 kg (201 lb 9.6 oz)   06/17/16 90.3 kg (199 lb)              Today, you had the following     No orders found for display         Today's Medication Changes          These changes are accurate as of: 7/12/17  7:52 AM.  If you have any questions, ask your nurse or doctor.               Start taking these medicines.        Dose/Directions    * morphine 30 MG 12 hr tablet   Commonly known as:  MS CONTIN   Used for:  Chest wall pain   Started by:  Edu Benitez MD        Start taking on:  8/1/2017   1 tab (30mg) in the morning and in the afternoon + 15mg at bedtime   Quantity:  60 tablet   Refills:  0       * morphine 15 MG 12 hr tablet   Commonly known as:  MS CONTIN   Used for:  Chest wall pain   Started by:  Edu Benitez MD        Start taking on:  8/1/2017   1 tab (15mg) at bedtime.   Quantity:  30 tablet   Refills:  0       * Notice:  This list has 2 medication(s) that are the same as other medications prescribed for you. Read the directions carefully, and ask your doctor or other care provider to review them with you.      These medicines have changed or have updated prescriptions.        Dose/Directions    Colchicine 0.6 MG Caps   This may have changed:  additional instructions   Used for:  Costal chondritis   Changed by:  Edu Benitez MD        Dose:  0.6 mg   Take 0.6 mg by mouth 3 times daily for costochondritis (chest) discomfort. Scale back use to prn with loose stools or bloating.   Quantity:  270 capsule   Refills:  1            Where to get your medicines      These medications were sent to [id number] - [place] - [address] [facility]   [address], [place] [address]    Hours:  Test fax successful 12/11/02   Phone:  [phone]     Colchicine 0.6 MG Caps    EPINEPHrine 0.3 MG/0.3ML injection    methocarbamol 750 MG tablet         Some of these will need a paper prescription and others can be bought  over the counter.  Ask your nurse if you have questions.     Bring a paper prescription for each of these medications     diazepam 5 MG tablet    morphine 15 MG 12 hr tablet    morphine 30 MG 12 hr tablet                Primary Care Provider Office Phone # Fax #    Chandni Orona [phone]       [address] [address]        Equal Access to Services     RODRIGO ZAMORA : Hadii aad ku hadasho Soomaali, waaxda luqadaha, qaybta kaalmada adeegyada, waxay idiin hayaan adeeg kharash la'aan ah. So [facility] [phone].    ATENCIÓN: Si lance mccollum, tiene a stiles disposición servicios gratuitos de asistencia lingüística. Llame al [phone].    We comply with applicable federal civil rights laws and [place] laws. We do not discriminate on the basis of race, color, national origin, age, disability sex, sexual orientation or gender identity.            Thank you!     Thank you for choosing [facility]  for your care. Our goal is always to provide you with excellent care. Hearing back from our patients is one way we can continue to improve our services. Please take a few minutes to complete the written survey that you may receive in the mail after your visit with us. Thank you!             Your Updated Medication List - Protect others around you: Learn how to safely use, store and throw away your medicines at www.disposemymeds.org.          This list is accurate as of: 7/12/17  7:52 AM.  Always use your most recent med list.                   Brand Name Dispense Instructions for use Diagnosis    ACAI PO      Take 1,000 mg by mouth 2 times daily    Breast cancer, unspecified laterality, Thyroid cancer (H), Chronic arthralgias of knees and hips, unspecified laterality       aluminum chloride 20 % external solution    DRYSOL    60 mL    Apply topically At Bedtime    Rash, Intertrigo       AZMACORT IN      Inhale 2 puffs into the lungs as  needed        BENADRYL PO      Take 50 mg by mouth as needed        BETAINE HCL PO      Take 2 tablets by mouth as needed Betaine HCL and Pepsin: Take 1-7 tabs by mouth daily, if burning take one tablet less once daily. Betaine HCL 1.04 g Pepsin 40 mg        calcitRIOL 0.25 MCG capsule    ROCALTROL    270 capsule    2 tabs in am and 1 tab qhs    Postsurgical hypothyroidism, Papillary carcinoma, follicular variant (H), Metastasis to cervical lymph node (H)       CALCIUM CITRATE +D 315-250 MG-UNIT Tabs per tablet   Generic drug:  calcium citrate-vitamin D     120 tablet    Take 2 tablets by mouth 3 times daily        celecoxib 200 MG capsule    celeBREX    180 capsule    Take 1 capsule (200 mg) by mouth 2 times daily    Chest wall pain       chlorhexidine 0.12 % solution    PERIDEX          Colchicine 0.6 MG Caps     270 capsule    Take 0.6 mg by mouth 3 times daily for costochondritis (chest) discomfort. Scale back use to prn with loose stools or bloating.    Costal chondritis       COMPOUND - PHARMACY TO MIX COMPOUNDED MEDICATION    CMPD RX    120 g    Apply small amount to affected area two times daily.Ketamine 6% + ketoprofen 10% + lidocaine 10% in Lipo.    Chest wall pain       cromolyn 4 % ophthalmic solution    OPTICROM    10 mL    Place 1 drop into both eyes 4 times daily    Idiopathic mast cell activation syndrome (H)       cyclobenzaprine 10 MG tablet    FLEXERIL     Take 10 mg by mouth 3 times daily as needed On hold Dr Monsalve        diazepam 5 MG tablet    VALIUM    90 tablet    Take 1 tablet (5 mg) by mouth 3 times daily as needed For muscle spasms    Chest wall pain       diclofenac 1 % Gel topical gel    VOLTAREN    100 g    Apply affected area two times daily PRN using enclosed dosing card.    Myofascial pain       docusate sodium 100 MG tablet    COLACE    60 tablet    Take 100 mg by mouth daily    Acute constipation       EPINEPHrine 0.3 MG/0.3ML injection    EPIPEN 2-CARIDAD    0.6 mL    Inject 0.3 mLs  (0.3 mg) into the muscle once as needed for anaphylaxis        hydrochlorothiazide 12.5 MG capsule    MICROZIDE    90 capsule    Take 1 capsule (12.5 mg) by mouth daily    Hypocalcemia       HYDROcodone-acetaminophen 5-325 MG per tablet    NORCO     TK 1 T PO Q 4 TO 6 H PRN P        levothyroxine 112 MCG tablet    SYNTHROID/LEVOTHROID    189 tablet    Monday-Saturday: (2 tablet/day);  Sunday: (3 tablet)    Postsurgical hypothyroidism, Papillary carcinoma, follicular variant (H), Postsurgical hypoparathyroidism (H), Thyroid cancer (H)       lidocaine 5 % ointment    XYLOCAINE    35.44 g    SANJANA TOPICALLY QID PRN    Chest wall pain       Lidocaine HCl Monohydrate Powd           MAGNESIUM CITRATE PO      Take 400 mg by mouth daily        methocarbamol 750 MG tablet    ROBAXIN    360 tablet    Take 1 tablet (750 mg) by mouth 4 times daily as needed . Ok to take a 5th Robaxin on very severe days.    Myofascial pain       mometasone 110 MCG/INH inhaler    ASMANEX 30 METERED DOSES    3 Inhaler    Inhale 1 puff into the lungs daily    Intermittent asthma, uncomplicated       montelukast 10 MG tablet    SINGULAIR    120 tablet    TAKE TWO TABLETS BY MOUTH TWICE DAILY    Idiopathic mast cell activation syndrome (H)       * morphine 30 MG 12 hr tablet   Start taking on:  8/1/2017    MS CONTIN    60 tablet    1 tab (30mg) in the morning and in the afternoon + 15mg at bedtime    Chest wall pain       * morphine 15 MG 12 hr tablet   Start taking on:  8/1/2017    MS CONTIN    30 tablet    1 tab (15mg) at bedtime.    Chest wall pain       NASALCROM 5.2 MG/ACT Aers Inhaler   Generic drug:  cromolyn sodium     1 Bottle    SPRAY ONE SPRAY( 1 ML) IN NOSTRIL DAILY    Mast cell disease, systemic (H)       OMEGA 3 PO      Take 5 mLs by mouth        ondansetron 4 MG ODT tab    ZOFRAN ODT    60 tablet    Take 1-2 tablets (4-8 mg) by mouth every 8 hours as needed for nausea or vomiting    Nausea with vomiting       * order for DME     2 Units     Equipment being ordered: compression stockings. 20-30 mm or 30 - 40 mm as patient can tolerate. Physical therapy to determine if they should be above or below the knee.    Venous stasis       * order for DME     2 Device    Equipment being ordered: compression bra    Malignant neoplasm of right female breast, unspecified site of breast       polyethylene glycol powder    MIRALAX    510 g    Take 17 g by mouth daily    Acute constipation       potassium chloride SA 20 MEQ CR tablet    potassium chloride    90 tablet    Take 1 tablet (20 mEq) by mouth daily    Hypokalemia       PROBIOTIC DAILY PO      Take 1 capsule by mouth daily Lacto acid bifidobacterium    Breast cancer, unspecified laterality, Thyroid cancer (H), Chronic arthralgias of knees and hips, unspecified laterality       ranitidine 75 MG tablet    ZANTAC    270 tablet    Take 1 tablet (75 mg) by mouth 3 times daily    Mast cell disease, systemic (H)       SUMAtriptan 50 MG tablet    IMITREX    5 tablet    Take 1 tablet (50 mg) by mouth at onset of headache for migraine (may repeat in 2 hours as needed, max 2 tablets daily)    Migraine without status migrainosus, not intractable, unspecified migraine type       tacrolimus 0.1 % ointment    PROTOPIC    60 g    Apply topically 2 times daily    Rash, Intertrigo       tamoxifen 20 MG tablet    NOLVADEX    90 tablet    Take 1 tablet (20 mg) by mouth daily    Malignant neoplasm of female breast, unspecified laterality, unspecified site of breast       triamcinolone 0.025 % ointment    KENALOG    80 g    Apply topically 2 times daily Mix with 1 lb jar of vaseline or aquaphor    Intertrigo, Rash       TUMS 500 MG chewable tablet   Generic drug:  calcium carbonate     180 chew tab    Take 2 tablets (1,000 mg) by mouth nightly as needed for other (cramps)        * UNABLE TO FIND      1 tablet 3 times daily MEDICATION NAME: calcium D-Glucarate        * UNABLE TO FIND      2 tablets 3 times daily MEDICATION NAME:  Natural D-Hist    Breast cancer, unspecified laterality, Papillary carcinoma, follicular variant (H), Chronic musculoskeletal pain       * UNABLE TO FIND      MEDICATION NAME: Tumeric 3 capsules daily        * UNABLE TO FIND      1 tablet 3 times daily MEDICATION NAME: Digestzymes    Thyroid cancer (H), Postsurgical hypothyroidism, Postsurgical hypoparathyroidism (H)       * UNABLE TO FIND      1 tablet daily MEDICATION NAME: Pure Encapsulations    Thyroid cancer (H), Postsurgical hypothyroidism, Postsurgical hypoparathyroidism (H)       VENTOLIN  (90 BASE) MCG/ACT Inhaler   Generic drug:  albuterol     18 g    INHALE 2 PUFFS INTO THE LUNGS EVERY 4 HOURS AS NEEDED FOR SHORTNESS OF BREATH OR DIFFICULT BREATHING OR WHEEZING    Mild intermittent asthma without complication       vitamin D3 2000 UNITS Caps     60 capsule    Take 10,000 Units by mouth daily    Thyroid cancer (H), Postsurgical hypothyroidism, Papillary carcinoma, follicular variant (H), Postsurgical hypoparathyroidism (H)       ZYRTEC ALLERGY 10 MG tablet   Generic drug:  cetirizine     90 tablet    Take 1 tablet (10 mg) by mouth 3 times daily On hold for lab test.        * Notice:  This list has 9 medication(s) that are the same as other medications prescribed for you. Read the directions carefully, and ask your doctor or other care provider to review them with you.

## 2017-07-15 DIAGNOSIS — D89.42 IDIOPATHIC MAST CELL ACTIVATION SYNDROME (H): Chronic | ICD-10-CM

## 2017-07-17 RX ORDER — MONTELUKAST SODIUM 10 MG/1
TABLET ORAL
Qty: 120 TABLET | Refills: 11 | Status: ON HOLD | OUTPATIENT
Start: 2017-07-17 | End: 2017-11-20

## 2017-07-17 NOTE — TELEPHONE ENCOUNTER
montelukast      Last Written Prescription Date: 6/30/17  Last Fill Quantity: 120,  # refills: 0   Last Office Visit with [facility], [facility] or [facility] prescribing provider: 12/20/16 with Dr. Avendaño

## 2017-07-21 DIAGNOSIS — R07.89 CHEST WALL PAIN: ICD-10-CM

## 2017-07-21 RX ORDER — CELECOXIB 200 MG/1
200 CAPSULE ORAL 2 TIMES DAILY
Qty: 180 CAPSULE | Refills: 0 | Status: SHIPPED | OUTPATIENT
Start: 2017-07-21 | End: 2017-10-17

## 2017-07-24 ENCOUNTER — ONCOLOGY VISIT (OUTPATIENT)
Dept: ONCOLOGY | Facility: CLINIC | Age: 57
End: 2017-07-24
Attending: INTERNAL MEDICINE
Payer: COMMERCIAL

## 2017-07-24 DIAGNOSIS — Z85.3 PERSONAL HISTORY OF MALIGNANT NEOPLASM OF BREAST: Primary | ICD-10-CM

## 2017-07-24 DIAGNOSIS — G89.28 OTHER CHRONIC POSTPROCEDURAL PAIN: ICD-10-CM

## 2017-07-24 DIAGNOSIS — E89.0 S/P THYROIDECTOMY: ICD-10-CM

## 2017-07-24 DIAGNOSIS — C73 PAPILLARY CARCINOMA, FOLLICULAR VARIANT (H): ICD-10-CM

## 2017-07-24 PROCEDURE — 99212 OFFICE O/P EST SF 10 MIN: CPT | Mod: ZF

## 2017-07-24 PROCEDURE — 99214 OFFICE O/P EST MOD 30 MIN: CPT | Mod: ZP | Performed by: INTERNAL MEDICINE

## 2017-07-24 RX ORDER — COLCHICINE 0.6 MG/1
1.6 TABLET, FILM COATED ORAL 3 TIMES DAILY
Refills: 1 | COMMUNITY
Start: 2017-07-14 | End: 2017-10-03

## 2017-07-24 ASSESSMENT — PAIN SCALES - GENERAL: PAINLEVEL: EXTREME PAIN (8)

## 2017-07-24 NOTE — LETTER
7/24/2017       RE: Tisha Arias  [address] [place] [address]     Dear Colleague,    Thank you for referring your patient, Tisha Arias, to the [facility]. Please see a copy of my visit note below.    July 27, 2017    REASON FOR VISIT:  Six month followup for stage I breast cancer.       HISTORY OF PRESENT ILLNESS:  Tisha Arias is a 56-year-old woman, postmenopausal, with a history of T1c N0 M0, infiltrating ductal carcinoma of the right breast with a strong family history of breast cancer. BRCA1 and 2 gene testing negative.  She was diagnosed with breast cancer in 12/2011.  She underwent bilateral mastectomy with immediate reconstruction on 01/30/2012 by Dr. Daugherty.  Her final pathology revealed 1.6-cm, infiltrating ductal carcinoma, grade 1/3, no angiolymphatic space invasion, no nipple or skin invasion.  There was associated DCIS, and the margins were free of tumor superiorly, anterior margin by 0.1 cm, and widely free at remaining margins.  This was ER/OK-positive, HER2-negative in the vast majority of cells and non-amplified with a ratio of 1.1.  She had an Oncotype test performed, which revealed a low-risk score of 11.  That put her overall risk of recurrence at about 7% after 5 years of tamoxifen.  She had significant complications postoperatively and eventually in 02/2012, she was started on Arimidex.  Also in 12/2012, she had prophylactic hysterectomy and oophorectomy, the pathology of which was benign.  Her course has been complicated by extreme chest wall pain, myofascial pain, and neuropathic pain.  She has gone through several different clinics and was put on several different medications, which were not doing her any good.  Eventually she was weaned off all the medications and is currently doing only PT with myofascial pain maneuvers with symptomatic improvement.      Incidentally, given that she was having so many symptoms, she underwent a PET  scan in 4/2013, which revealed a thyroid nodule which was biopsied and was consistent with papillary carcinoma.  She has undergone resection as well as radioiodine treatment since and has done relatively well from the surgery.  She follows up with Dr. Castro for the same.  She unfortunately required etoh ablation therapy over the thyroid lymph nodes.   She has not had all of the nodes treated; she remains under the care of Dr Avelina Castro.      Due to severe myofascial pain, arthralgia, she was switched to tamoxifen in 2/2014.    She had a diagnosis of mast cell activation syndrome in 2015; she sees Dr Brian Avendaño for this.    INTERVAL HISTORY:  In returning today, Elvin is doing better.  She is gathering that she will never be completely pain free; this has been a challenge for her to hear but she is starting to understand it.  She continues to see Dr Benitez in palliative care and Dr Avendaño.      She has no hot flashes, no vaginal discharge, or vaginal bleeding.  She is not currently sexually active.  No shortness of breath.  She is eating and drinking well.        Her 10-point review of systems is otherwise negative.        PAST MEDICAL HISTORY:  reviewed       MEDICATIONS:  reviewed     PHYSICAL EXAMINATION:    VITAL SIGNS:There were no vitals taken for this visit.  Vitals in chart and reviewed  GENERAL:  She is well-appearing, in no apparent distress.    HEENT:  Exam reveals no icterus or pallor.  Oropharynx is clear with no ulcers or lesions.    NECK:  Supple.  No supraclavicular or cervical lymphadenopathy.  She has a scar from thyroid surgery.     HEART:  Regular rate and rhythm.  S1, S2 clear.  No murmur, gallop or rub.    LUNGS:  Clear to auscultation bilaterally.    ABDOMEN:  Soft, nontender, nondistended, no organomegaly.    BREAST EXAM:  Very sensitive to even light touch though improved. She is status post bilateral mastectomy with immediate reconstruction.  No nodules or masses appreciated, no  axillary adenopathy  EXTREMITIES:  She has no lower extremity edema.    SKIN: no rash.     LABORATORY:  No lab draws were done before the visit for today.          ASSESSMENT AND PLAN:  The patient is a 56-year-old female with a history of stage I, ER/MI-positive, HER2-negative breast cancer dx in 2011. Oncotype recurrence score of 11. S/p bilateral mastectomies and BSO. On endocrine therapy with tamoxifen (anastrazole discontinued b/c of arthralgias).     1. Breast cancer - She was on Arimidex from Feb 2012 to Feb 2014, then switched to tamoxifen for ongoing arthralgias.    She is overall doing better.   She has minimal side effects from the tamoxifen, and is tolerating it okay.  We will plan on 10 years of endocrine therapy as per the ATLAS and ATTOM trials.  She should have an annual GYN exam while on tamoxifen.       We will continue to see her every six months.      2. Papillary thyroid cancer, it is being followed by Dr. Avelina Catsro.  She underwent etoh ablation x3 so far.        3.  Synchronous thyroid cancer and breast cancer.  She is brca 1 and 2 negative.  Additional testing is negative.       4. She continues with myofascial PT.  She has seen the [facility], the [facility], Dr Miracle Morin at [facility].  She is now also seeing the palliative care here and has found this most helpful; overall this is much improved from prior visits.    5.Mast cell activation syndrome - she is seeing Dr Avendaño.  We discussed that he is leaving our institution.      Shahla Crawley MD

## 2017-07-24 NOTE — PROGRESS NOTES
July 27, 2017    REASON FOR VISIT:  Six month followup for stage I breast cancer.       HISTORY OF PRESENT ILLNESS:  Tisha Arias is a 56-year-old woman, postmenopausal, with a history of T1c N0 M0, infiltrating ductal carcinoma of the right breast with a strong family history of breast cancer. BRCA1 and 2 gene testing negative.  She was diagnosed with breast cancer in 12/2011.  She underwent bilateral mastectomy with immediate reconstruction on 01/30/2012 by Dr. Daugherty.  Her final pathology revealed 1.6-cm, infiltrating ductal carcinoma, grade 1/3, no angiolymphatic space invasion, no nipple or skin invasion.  There was associated DCIS, and the margins were free of tumor superiorly, anterior margin by 0.1 cm, and widely free at remaining margins.  This was ER/PA-positive, HER2-negative in the vast majority of cells and non-amplified with a ratio of 1.1.  She had an Oncotype test performed, which revealed a low-risk score of 11.  That put her overall risk of recurrence at about 7% after 5 years of tamoxifen.  She had significant complications postoperatively and eventually in 02/2012, she was started on Arimidex.  Also in 12/2012, she had prophylactic hysterectomy and oophorectomy, the pathology of which was benign.  Her course has been complicated by extreme chest wall pain, myofascial pain, and neuropathic pain.  She has gone through several different clinics and was put on several different medications, which were not doing her any good.  Eventually she was weaned off all the medications and is currently doing only PT with myofascial pain maneuvers with symptomatic improvement.      Incidentally, given that she was having so many symptoms, she underwent a PET scan in 4/2013, which revealed a thyroid nodule which was biopsied and was consistent with papillary carcinoma.  She has undergone resection as well as radioiodine treatment since and has done relatively well from the surgery.  She follows up with  Dr. Castro for the same.  She unfortunately required etoh ablation therapy over the thyroid lymph nodes.   She has not had all of the nodes treated; she remains under the care of Dr Avelina Castro.      Due to severe myofascial pain, arthralgia, she was switched to tamoxifen in 2/2014.    She had a diagnosis of mast cell activation syndrome in 2015; she sees Dr Brian Avendaño for this.    INTERVAL HISTORY:  In returning today, Elvin is doing better.  She is gathering that she will never be completely pain free; this has been a challenge for her to hear but she is starting to understand it.  She continues to see Dr Benitez in palliative care and Dr Avendaño.      She has no hot flashes, no vaginal discharge, or vaginal bleeding.  She is not currently sexually active.  No shortness of breath.  She is eating and drinking well.        Her 10-point review of systems is otherwise negative.        PAST MEDICAL HISTORY:  reviewed       MEDICATIONS:  reviewed     PHYSICAL EXAMINATION:    VITAL SIGNS:There were no vitals taken for this visit.  Vitals in chart and reviewed  GENERAL:  She is well-appearing, in no apparent distress.    HEENT:  Exam reveals no icterus or pallor.  Oropharynx is clear with no ulcers or lesions.    NECK:  Supple.  No supraclavicular or cervical lymphadenopathy.  She has a scar from thyroid surgery.     HEART:  Regular rate and rhythm.  S1, S2 clear.  No murmur, gallop or rub.    LUNGS:  Clear to auscultation bilaterally.    ABDOMEN:  Soft, nontender, nondistended, no organomegaly.    BREAST EXAM:  Very sensitive to even light touch though improved. She is status post bilateral mastectomy with immediate reconstruction.  No nodules or masses appreciated, no axillary adenopathy  EXTREMITIES:  She has no lower extremity edema.    SKIN: no rash.     LABORATORY:  No lab draws were done before the visit for today.          ASSESSMENT AND PLAN:  The patient is a 56-year-old female with a history of stage I,  ER/[place]-positive, HER2-negative breast cancer dx in 2011. Oncotype recurrence score of 11. S/p bilateral mastectomies and BSO. On endocrine therapy with tamoxifen (anastrazole discontinued b/c of arthralgias).     1. Breast cancer - She was on Arimidex from Feb 2012 to Feb 2014, then switched to tamoxifen for ongoing arthralgias.    She is overall doing better.   She has minimal side effects from the tamoxifen, and is tolerating it okay.  We will plan on 10 years of endocrine therapy as per the ATLAS and ATTOM trials.  She should have an annual GYN exam while on tamoxifen.       We will continue to see her every six months.      2. Papillary thyroid cancer, it is being followed by Dr. Aveilna Castro.  She underwent etoh ablation x3 so far.        3.  Synchronous thyroid cancer and breast cancer.  She is brca 1 and 2 negative.  Additional testing is negative.       4. She continues with myofascial PT.  She has seen the [facility], the [facility], Dr Miracle Morin at [facility].  She is now also seeing the palliative care here and has found this most helpful; overall this is much improved from prior visits.    5.Mast cell activation syndrome - she is seeing Dr Avendaño.  We discussed that he is leaving our institution.      Shahla Crawley MD

## 2017-07-24 NOTE — MR AVS SNAPSHOT
After Visit Summary   7/24/2017    Tisha Arias    MRN: [id number]           Patient Information     Date Of Birth          1960        Visit Information        Provider Department      7/24/2017 4:30 PM Shahla Crawley MD [facility]        Today's Diagnoses     Personal history of malignant neoplasm of breast    -  1    S/P thyroidectomy        Other chronic postprocedural pain        Papillary carcinoma, follicular variant (H)           Follow-ups after your visit        Your next 10 appointments already scheduled     Nov 02, 2017  5:00 PM CDT   US HEAD NECK SOFT TISSUE with UCUS3   [facility] ([facility])    [address] [place] [id number] [phone]           Please bring a list of your medicines (including vitamins, minerals and over-the-counter drugs). Also, tell your doctor about any allergies you may have. Wear comfortable clothes and leave your valuables at home.  You do not need to do anything special to prepare for your exam.  Please call the Imaging Department at your exam site with any questions.            Nov 02, 2017  6:00 PM CDT   LAB with  LAB   [facility] ([facility])    [address] [place] [address] [id number] [phone]           Patient must bring picture ID. Patient should be prepared to give a urine specimen  Please do not eat 10-12 hours before your appointment if you are coming in fasting for labs on lipids, cholesterol, or glucose (sugar). Pregnant women should follow their Care Team instructions. Water with medications is okay. Do not drink coffee or other fluids. If you have concerns about taking  your medications, please ask at office or if scheduling via [employer], send a message by clicking on Secure Messaging, Message Your Care Team.            Nov 13, 2017  4:30 PM CST   (Arrive by 4:15 PM)   RETURN ENDOCRINE with  Avelina Castro MD   [facility] ([facility])    [address]  3[address]  [facility] [address] [phone]              Who to contact     If you have questions or need follow up information about today's clinic visit or your schedule please contact [facility] directly at [phone].  Normal or non-critical lab and imaging results will be communicated to you by MyChart, letter or phone within 4 business days after the clinic has received the results. If you do not hear from us within 7 days, please contact the clinic through [employer] or phone. If you have a critical or abnormal lab result, we will notify you by phone as soon as possible.  Submit refill requests through [employer] or call your pharmacy and they will forward the refill request to us. Please allow 3 business days for your refill to be completed.          Additional Information About Your Visit        [employer] Information     [employer] gives you secure access to your electronic health record. If you see a primary care provider, you can also send messages to your care team and make appointments. If you have questions, please call your primary care clinic.  If you do not have a primary care provider, please call [phone] and they will assist you.        Care EveryWhere ID     This is your Care EveryWhere ID. This could be used by other organizations to access your [facility] medical records  [id number]         Blood Pressure from Last 3 Encounters:   07/12/17 130/79   05/02/17 103/70   04/26/17 156/87    Weight from Last 3 Encounters:   07/12/17 87.1 kg (192 lb)   07/12/16 91.4 kg (201 lb 9.6 oz)   06/17/16 90.3 kg (199 lb)              Today, you had the following     No orders found for display       Primary Care Provider Office Phone # Fax #    Chandni Orona [phone]       [address] [address]         Equal Access to Services     [facility] : Hadii sacha marley alexjennifer Theo, wacastilloda luqadaha, qaybta kamounaranjan brown. So [facility] [phone].    ATENCIÓN: Si habla veda, tiene a stiles disposición servicios gratuitos de asistencia lingüística. Llame al [phone].    We comply with applicable federal civil rights laws and [place] laws. We do not discriminate on the basis of race, color, national origin, age, disability sex, sexual orientation or gender identity.            Thank you!     Thank you for choosing [facility]  for your care. Our goal is always to provide you with excellent care. Hearing back from our patients is one way we can continue to improve our services. Please take a few minutes to complete the written survey that you may receive in the mail after your visit with us. Thank you!             Your Updated Medication List - Protect others around you: Learn how to safely use, store and throw away your medicines at www.disposemymeds.org.          This list is accurate as of: 7/24/17 11:59 PM.  Always use your most recent med list.                   Brand Name Dispense Instructions for use Diagnosis    ACAI PO      Take 1,000 mg by mouth 2 times daily    Breast cancer, unspecified laterality, Thyroid cancer (H), Chronic arthralgias of knees and hips, unspecified laterality       aluminum chloride 20 % external solution    DRYSOL    60 mL    Apply topically At Bedtime    Rash, Intertrigo       AZMACORT IN      Inhale 2 puffs into the lungs as needed        BENADRYL PO      Take 50 mg by mouth as needed        BETAINE HCL PO      Take 2 tablets by mouth as needed Betaine HCL and Pepsin: Take 1-7 tabs by mouth daily, if burning take one tablet less once daily. Betaine HCL 1.04 g Pepsin 40 mg        calcitRIOL 0.25 MCG capsule    ROCALTROL    270 capsule    2 tabs in am and 1 tab qhs    Postsurgical hypothyroidism, Papillary carcinoma,  follicular variant (H), Metastasis to cervical lymph node (H)       CALCIUM CITRATE +D 315-250 MG-UNIT Tabs per tablet   Generic drug:  calcium citrate-vitamin D     120 tablet    Take 2 tablets by mouth 3 times daily        celecoxib 200 MG capsule    celeBREX    180 capsule    Take 1 capsule (200 mg) by mouth 2 times daily    Chest wall pain       chlorhexidine 0.12 % solution    PERIDEX          * Colchicine 0.6 MG Caps     270 capsule    Take 0.6 mg by mouth 3 times daily for costochondritis (chest) discomfort. Scale back use to prn with loose stools or bloating.    Costal chondritis       * COLCRYS 0.6 MG tablet   Generic drug:  colchicine      Take 1.6 mg by mouth 3 times daily        COMPOUNDED NON-CONTROLLED SUBSTANCE - PHARMACY TO MIX COMPOUNDED MEDICATION    CMPD RX    120 g    Apply small amount to affected area two times daily.Ketamine 6% + ketoprofen 10% + lidocaine 10% in Lipo.    Chest wall pain       cromolyn 4 % ophthalmic solution    OPTICROM    10 mL    Place 1 drop into both eyes 4 times daily    Idiopathic mast cell activation syndrome (H)       cyclobenzaprine 10 MG tablet    FLEXERIL     Take 10 mg by mouth 3 times daily as needed On hold Dr Monsalve        diazepam 5 MG tablet    VALIUM    90 tablet    Take 1 tablet (5 mg) by mouth 3 times daily as needed For muscle spasms    Chest wall pain       diclofenac 1 % Gel topical gel    VOLTAREN    100 g    Apply affected area two times daily PRN using enclosed dosing card.    Myofascial pain       docusate sodium 100 MG tablet    COLACE    60 tablet    Take 100 mg by mouth daily    Acute constipation       EPINEPHrine 0.3 MG/0.3ML injection 2-pack    EPIPEN 2-CARIDAD    0.6 mL    Inject 0.3 mLs (0.3 mg) into the muscle once as needed for anaphylaxis        hydrochlorothiazide 12.5 MG capsule    MICROZIDE    90 capsule    Take 1 capsule (12.5 mg) by mouth daily    Hypocalcemia       HYDROcodone-acetaminophen 5-325 MG per tablet    NORCO     TK 1 T PO Q 4  TO 6 H PRN P        levothyroxine 112 MCG tablet    SYNTHROID/LEVOTHROID    189 tablet    Monday-Saturday: (2 tablet/day);  Sunday: (3 tablet)    Postsurgical hypothyroidism, Papillary carcinoma, follicular variant (H), Postsurgical hypoparathyroidism (H), Thyroid cancer (H)       lidocaine 5 % ointment    XYLOCAINE    35.44 g    SANJANA TOPICALLY QID PRN    Chest wall pain       Lidocaine HCl Monohydrate Powd           MAGNESIUM CITRATE PO      Take 400 mg by mouth daily        methocarbamol 750 MG tablet    ROBAXIN    360 tablet    Take 1 tablet (750 mg) by mouth 4 times daily as needed . Ok to take a 5th Robaxin on very severe days.    Myofascial pain       mometasone 110 MCG/INH inhaler    ASMANEX 30 METERED DOSES    3 Inhaler    Inhale 1 puff into the lungs daily    Intermittent asthma, uncomplicated       montelukast 10 MG tablet    SINGULAIR    120 tablet    TAKE TWO TABLETS BY MOUTH TWICE DAILY    Idiopathic mast cell activation syndrome (H)       * morphine 30 MG 12 hr tablet   Start taking on:  8/1/2017    MS CONTIN    60 tablet    1 tab (30mg) in the morning and in the afternoon + 15mg at bedtime    Chest wall pain       * morphine 15 MG 12 hr tablet   Start taking on:  8/1/2017    MS CONTIN    30 tablet    1 tab (15mg) at bedtime.    Chest wall pain       NASALCROM 5.2 MG/ACT Aers Inhaler   Generic drug:  cromolyn sodium     1 Bottle    SPRAY ONE SPRAY( 1 ML) IN NOSTRIL DAILY    Mast cell disease, systemic (H)       OMEGA 3 PO      Take 5 mLs by mouth        ondansetron 4 MG ODT tab    ZOFRAN ODT    60 tablet    Take 1-2 tablets (4-8 mg) by mouth every 8 hours as needed for nausea or vomiting    Nausea with vomiting       * order for DME     2 Units    Equipment being ordered: compression stockings. 20-30 mm or 30 - 40 mm as patient can tolerate. Physical therapy to determine if they should be above or below the knee.    Venous stasis       * order for DME     2 Device    Equipment being ordered: compression  bra    Malignant neoplasm of right female breast, unspecified site of breast       polyethylene glycol powder    MIRALAX    510 g    Take 17 g by mouth daily    Acute constipation       potassium chloride SA 20 MEQ CR tablet    potassium chloride    90 tablet    Take 1 tablet (20 mEq) by mouth daily    Hypokalemia       PROBIOTIC DAILY PO      Take 1 capsule by mouth daily Lacto acid bifidobacterium    Breast cancer, unspecified laterality, Thyroid cancer (H), Chronic arthralgias of knees and hips, unspecified laterality       ranitidine 75 MG tablet    ZANTAC    270 tablet    Take 1 tablet (75 mg) by mouth 3 times daily    Mast cell disease, systemic (H)       tacrolimus 0.1 % ointment    PROTOPIC    60 g    Apply topically 2 times daily    Rash, Intertrigo       tamoxifen 20 MG tablet    NOLVADEX    90 tablet    Take 1 tablet (20 mg) by mouth daily    Malignant neoplasm of female breast, unspecified laterality, unspecified site of breast       triamcinolone 0.025 % ointment    KENALOG    80 g    Apply topically 2 times daily Mix with 1 lb jar of vaseline or aquaphor    Intertrigo, Rash       TUMS 500 MG chewable tablet   Generic drug:  calcium carbonate     180 chew tab    Take 2 tablets (1,000 mg) by mouth nightly as needed for other (cramps)        * UNABLE TO FIND      1 tablet 3 times daily MEDICATION NAME: calcium D-Glucarate        * UNABLE TO FIND      2 tablets 3 times daily MEDICATION NAME: Natural D-Hist    Breast cancer, unspecified laterality, Papillary carcinoma, follicular variant (H), Chronic musculoskeletal pain       * UNABLE TO FIND      MEDICATION NAME: Tumeric 3 capsules daily        * UNABLE TO FIND      1 tablet 3 times daily MEDICATION NAME: Digestzymes    Thyroid cancer (H), Postsurgical hypothyroidism, Postsurgical hypoparathyroidism (H)       * UNABLE TO FIND      1 tablet daily MEDICATION NAME: Pure Encapsulations    Thyroid cancer (H), Postsurgical hypothyroidism, Postsurgical  hypoparathyroidism (H)       VENTOLIN  (90 BASE) MCG/ACT Inhaler   Generic drug:  albuterol     18 g    INHALE 2 PUFFS INTO THE LUNGS EVERY 4 HOURS AS NEEDED FOR SHORTNESS OF BREATH OR DIFFICULT BREATHING OR WHEEZING    Mild intermittent asthma without complication       vitamin D3 2000 UNITS Caps     60 capsule    Take 10,000 Units by mouth daily    Thyroid cancer (H), Postsurgical hypothyroidism, Papillary carcinoma, follicular variant (H), Postsurgical hypoparathyroidism (H)       ZYRTEC ALLERGY 10 MG tablet   Generic drug:  cetirizine     90 tablet    Take 1 tablet (10 mg) by mouth 3 times daily On hold for lab test.        * Notice:  This list has 11 medication(s) that are the same as other medications prescribed for you. Read the directions carefully, and ask your doctor or other care provider to review them with you.

## 2017-07-24 NOTE — LETTER
7/24/2017      RE: Tisha Arias  [address] [place] [address]       July 27, 2017    REASON FOR VISIT:  Six month followup for stage I breast cancer.       HISTORY OF PRESENT ILLNESS:  Tisha Arias is a 56-year-old woman, postmenopausal, with a history of T1c N0 M0, infiltrating ductal carcinoma of the right breast with a strong family history of breast cancer. BRCA1 and 2 gene testing negative.  She was diagnosed with breast cancer in 12/2011.  She underwent bilateral mastectomy with immediate reconstruction on 01/30/2012 by Dr. Daugherty.  Her final pathology revealed 1.6-cm, infiltrating ductal carcinoma, grade 1/3, no angiolymphatic space invasion, no nipple or skin invasion.  There was associated DCIS, and the margins were free of tumor superiorly, anterior margin by 0.1 cm, and widely free at remaining margins.  This was ER/[place]-positive, HER2-negative in the vast majority of cells and non-amplified with a ratio of 1.1.  She had an Oncotype test performed, which revealed a low-risk score of 11.  That put her overall risk of recurrence at about 7% after 5 years of tamoxifen.  She had significant complications postoperatively and eventually in 02/2012, she was started on Arimidex.  Also in 12/2012, she had prophylactic hysterectomy and oophorectomy, the pathology of which was benign.  Her course has been complicated by extreme chest wall pain, myofascial pain, and neuropathic pain.  She has gone through several different clinics and was put on several different medications, which were not doing her any good.  Eventually she was weaned off all the medications and is currently doing only PT with myofascial pain maneuvers with symptomatic improvement.      Incidentally, given that she was having so many symptoms, she underwent a PET scan in 4/2013, which revealed a thyroid nodule which was biopsied and was consistent with papillary carcinoma.  She has undergone resection as well as  radioiodine treatment since and has done relatively well from the surgery.  She follows up with Dr. Castro for the same.  She unfortunately required etoh ablation therapy over the thyroid lymph nodes.   She has not had all of the nodes treated; she remains under the care of Dr Avelina Castro.      Due to severe myofascial pain, arthralgia, she was switched to tamoxifen in 2/2014.    She had a diagnosis of mast cell activation syndrome in 2015; she sees Dr Brian Avendaño for this.    INTERVAL HISTORY:  In returning today, Elvin is doing better.  She is gathering that she will never be completely pain free; this has been a challenge for her to hear but she is starting to understand it.  She continues to see Dr Benitez in palliative care and Dr Avendaño.      She has no hot flashes, no vaginal discharge, or vaginal bleeding.  She is not currently sexually active.  No shortness of breath.  She is eating and drinking well.        Her 10-point review of systems is otherwise negative.        PAST MEDICAL HISTORY:  reviewed       MEDICATIONS:  reviewed     PHYSICAL EXAMINATION:    VITAL SIGNS:There were no vitals taken for this visit.  Vitals in chart and reviewed  GENERAL:  She is well-appearing, in no apparent distress.    HEENT:  Exam reveals no icterus or pallor.  Oropharynx is clear with no ulcers or lesions.    NECK:  Supple.  No supraclavicular or cervical lymphadenopathy.  She has a scar from thyroid surgery.     HEART:  Regular rate and rhythm.  S1, S2 clear.  No murmur, gallop or rub.    LUNGS:  Clear to auscultation bilaterally.    ABDOMEN:  Soft, nontender, nondistended, no organomegaly.    BREAST EXAM:  Very sensitive to even light touch though improved. She is status post bilateral mastectomy with immediate reconstruction.  No nodules or masses appreciated, no axillary adenopathy  EXTREMITIES:  She has no lower extremity edema.    SKIN: no rash.     LABORATORY:  No lab draws were done before the visit for  today.          ASSESSMENT AND PLAN:  The patient is a 56-year-old female with a history of stage I, ER/[place]-positive, HER2-negative breast cancer dx in 2011. Oncotype recurrence score of 11. S/p bilateral mastectomies and BSO. On endocrine therapy with tamoxifen (anastrazole discontinued b/c of arthralgias).     1. Breast cancer - She was on Arimidex from Feb 2012 to Feb 2014, then switched to tamoxifen for ongoing arthralgias.    She is overall doing better.   She has minimal side effects from the tamoxifen, and is tolerating it okay.  We will plan on 10 years of endocrine therapy as per the ATLAS and ATTOM trials.  She should have an annual GYN exam while on tamoxifen.       We will continue to see her every six months.      2. Papillary thyroid cancer, it is being followed by Dr. Avelina Castro.  She underwent etoh ablation x3 so far.        3.  Synchronous thyroid cancer and breast cancer.  She is brca 1 and 2 negative.  Additional testing is negative.       4. She continues with myofascial PT.  She has seen the [facility], the [facility], Dr Mircale Morin at [facility].  She is now also seeing the palliative care here and has found this most helpful; overall this is much improved from prior visits.    5.Mast cell activation syndrome - she is seeing Dr Avendaño.  We discussed that he is leaving our institution.      Shahla Crawley MD

## 2017-07-24 NOTE — NURSING NOTE
"Oncology Rooming Note    July 24, 2017 5:14 PM   Tisha Arias is a 56 year old female who presents for:    Chief Complaint   Patient presents with     Oncology Clinic Visit     Breast Ca, 6 Mo F/U     Initial Vitals: There were no vitals taken for this visit. Estimated body mass index is 31.95 kg/(m^2) as calculated from the following:    Height as of 7/12/17: 1.651 m (5' 5\").    Weight as of 7/12/17: 87.1 kg (192 lb). There is no height or weight on file to calculate BSA.  Extreme Pain (8) Comment: Chest and Mid to Upper Back   No LMP recorded. Patient is postmenopausal.  Allergies reviewed: Yes  Medications reviewed: Yes    Medications: Medication refills not needed today.  Pharmacy name entered into [facility]: [id number] - [place], [place] - [address] [address] [place]    Clinical concerns: None Dr Crawley was NOT notified.    7 minutes for nursing intake (face to face time)     Naima Saavedra LPN              "

## 2017-07-26 DIAGNOSIS — G43.909 MIGRAINE WITHOUT STATUS MIGRAINOSUS, NOT INTRACTABLE, UNSPECIFIED MIGRAINE TYPE: ICD-10-CM

## 2017-07-26 RX ORDER — SUMATRIPTAN 50 MG/1
50 TABLET, FILM COATED ORAL
Qty: 5 TABLET | Refills: 3 | Status: SHIPPED | OUTPATIENT
Start: 2017-07-26 | End: 2017-10-23

## 2017-07-26 NOTE — TELEPHONE ENCOUNTER
Imitrex       Last Written Prescription Date: 04/27/2017  Last Fill Quantity: 5,  # refills: 3   Last Office Visit with [facility], [facility] or [facility] prescribing provider: 01/06/2017

## 2017-07-26 NOTE — TELEPHONE ENCOUNTER
Prescription approved per [facility] Refill Protocol.  Mary Wilkerson, RN - BC

## 2017-07-28 ENCOUNTER — TELEPHONE (OUTPATIENT)
Dept: FAMILY MEDICINE | Facility: CLINIC | Age: 57
End: 2017-07-28

## 2017-07-28 NOTE — TELEPHONE ENCOUNTER
Reason for Call:  Other prescription    Detailed comments: [facility] called wondering where the authorization for prescription was. Patient has new pharmacy [facility] [id number]. Please resend autho.    Phone Number Patient can be reached at: Other phone number:  [phone]    Best Time: Any time    Can we leave a detailed message on this number? Not Applicable    Call taken on 7/28/2017 at 10:18 AM by Gayla Bae

## 2017-07-31 NOTE — TELEPHONE ENCOUNTER
Called and spoke to pharmacy at [facility]. Informed pharmacy that Imitrex Rx was sent to [facility]. [facility] pharmacy will contact [facility].    Daniella Tello MA

## 2017-08-17 DIAGNOSIS — R07.89 CHEST WALL PAIN: ICD-10-CM

## 2017-08-17 NOTE — TELEPHONE ENCOUNTER
Received VM from patient requesting refill of morphine.     Last refills: 8/1/2017  Last office visit: 1/12/2017  Needs follow up appointment in 10/2017.     Will route request to MD for review. Left VM for patient to call and leave me a VM/call triage and let them know how she would like to get approved scripts.     Reviewed [place]  Report.

## 2017-08-18 RX ORDER — MORPHINE SULFATE 15 MG/1
TABLET, FILM COATED, EXTENDED RELEASE ORAL
Qty: 30 TABLET | Refills: 0 | Status: SHIPPED | OUTPATIENT
Start: 2017-08-27 | End: 2017-09-08

## 2017-08-18 RX ORDER — MORPHINE SULFATE 30 MG/1
TABLET, FILM COATED, EXTENDED RELEASE ORAL
Qty: 60 TABLET | Refills: 0 | Status: SHIPPED | OUTPATIENT
Start: 2017-08-27 | End: 2017-09-08

## 2017-08-31 ENCOUNTER — TELEPHONE (OUTPATIENT)
Dept: ONCOLOGY | Facility: CLINIC | Age: 57
End: 2017-08-31

## 2017-08-31 NOTE — TELEPHONE ENCOUNTER
Received message from after hours triage RN that patient call the triage number at [phone] and reported she was experiencing chest pain when she takes a deep breath. RN called patient to further assess symptoms. Patient reports she started having mild pain with deep breaths on Tuesday but the pain increased yesterday and woke her up at 0300. She describes the pain as bilateral chest pain that goes down the sternum. She says it is constantly there but is worse with deep breaths and feels sharp when she takes a deep breath. She denies shortness of breath, cough, or fever. She took her vitals at home and reports /80, Pulse 68. Patient states she is refusing to go to the ED.     Dr. Crawley notified of symptoms. Dr. Crawley does recommended that patient be seen in the ED related to symptoms of chest pain that is worse with deep breaths.     Patient did say she refused the ER in our first conversation. Patient was called back and a message was left notifying her that Dr. Crawley does recommend that she be seen in the ER and to call if she has any further questions or concerns.     CASSIDY Blunt

## 2017-09-01 ENCOUNTER — HOSPITAL ENCOUNTER (EMERGENCY)
Facility: CLINIC | Age: 57
Discharge: HOME | End: 2017-09-01
Attending: FAMILY MEDICINE | Admitting: FAMILY MEDICINE
Payer: COMMERCIAL

## 2017-09-01 ENCOUNTER — TELEPHONE (OUTPATIENT)
Dept: PALLIATIVE CARE | Facility: CLINIC | Age: 57
End: 2017-09-01

## 2017-09-01 ENCOUNTER — APPOINTMENT (OUTPATIENT)
Dept: GENERAL RADIOLOGY | Facility: CLINIC | Age: 57
End: 2017-09-01
Attending: FAMILY MEDICINE
Payer: COMMERCIAL

## 2017-09-01 ENCOUNTER — APPOINTMENT (OUTPATIENT)
Dept: CT IMAGING | Facility: CLINIC | Age: 57
End: 2017-09-01
Attending: FAMILY MEDICINE
Payer: COMMERCIAL

## 2017-09-01 VITALS
HEIGHT: 65 IN | RESPIRATION RATE: 14 BRPM | BODY MASS INDEX: 32.69 KG/M2 | TEMPERATURE: 98.9 F | SYSTOLIC BLOOD PRESSURE: 133 MMHG | OXYGEN SATURATION: 99 % | HEART RATE: 129 BPM | DIASTOLIC BLOOD PRESSURE: 81 MMHG | WEIGHT: 196.2 LBS

## 2017-09-01 DIAGNOSIS — M89.9 RIB LESION: ICD-10-CM

## 2017-09-01 DIAGNOSIS — R07.89 CHEST WALL PAIN: ICD-10-CM

## 2017-09-01 LAB
ALBUMIN SERPL-MCNC: 3.5 G/DL (ref 3.4–5)
ALP SERPL-CCNC: 74 U/L (ref 40–150)
ALT SERPL W P-5'-P-CCNC: 26 U/L (ref 0–50)
ANION GAP SERPL CALCULATED.3IONS-SCNC: 7 MMOL/L (ref 3–14)
APTT PPP: 26 SEC (ref 22–37)
AST SERPL W P-5'-P-CCNC: 23 U/L (ref 0–45)
BASOPHILS # BLD AUTO: 0 10E9/L (ref 0–0.2)
BASOPHILS NFR BLD AUTO: 0.4 %
BILIRUB SERPL-MCNC: 0.4 MG/DL (ref 0.2–1.3)
BUN SERPL-MCNC: 12 MG/DL (ref 7–30)
CALCIUM SERPL-MCNC: 8.6 MG/DL (ref 8.5–10.1)
CHLORIDE SERPL-SCNC: 108 MMOL/L (ref 94–109)
CO2 SERPL-SCNC: 29 MMOL/L (ref 20–32)
CREAT SERPL-MCNC: 0.82 MG/DL (ref 0.52–1.04)
D DIMER PPP FEU-MCNC: 0.5 UG/ML FEU (ref 0–0.5)
DIFFERENTIAL METHOD BLD: ABNORMAL
EOSINOPHIL # BLD AUTO: 0.1 10E9/L (ref 0–0.7)
EOSINOPHIL NFR BLD AUTO: 1.2 %
ERYTHROCYTE [DISTWIDTH] IN BLOOD BY AUTOMATED COUNT: 15.3 % (ref 10–15)
GFR SERPL CREATININE-BSD FRML MDRD: 72 ML/MIN/1.7M2
GLUCOSE SERPL-MCNC: 117 MG/DL (ref 70–99)
HCT VFR BLD AUTO: 35.9 % (ref 35–47)
HGB BLD-MCNC: 11 G/DL (ref 11.7–15.7)
IMM GRANULOCYTES # BLD: 0 10E9/L (ref 0–0.4)
IMM GRANULOCYTES NFR BLD: 0.2 %
INR PPP: 1 (ref 0.86–1.14)
LYMPHOCYTES # BLD AUTO: 1.4 10E9/L (ref 0.8–5.3)
LYMPHOCYTES NFR BLD AUTO: 26.3 %
MCH RBC QN AUTO: 24.7 PG (ref 26.5–33)
MCHC RBC AUTO-ENTMCNC: 30.6 G/DL (ref 31.5–36.5)
MCV RBC AUTO: 81 FL (ref 78–100)
MONOCYTES # BLD AUTO: 0.4 10E9/L (ref 0–1.3)
MONOCYTES NFR BLD AUTO: 8.3 %
NEUTROPHILS # BLD AUTO: 3.3 10E9/L (ref 1.6–8.3)
NEUTROPHILS NFR BLD AUTO: 63.6 %
NRBC # BLD AUTO: 0 10*3/UL
NRBC BLD AUTO-RTO: 0 /100
NT-PROBNP SERPL-MCNC: 59 PG/ML (ref 0–900)
PLATELET # BLD AUTO: 225 10E9/L (ref 150–450)
POTASSIUM SERPL-SCNC: 3.6 MMOL/L (ref 3.4–5.3)
PROT SERPL-MCNC: 7.6 G/DL (ref 6.8–8.8)
RADIOLOGIST FLAGS: ABNORMAL
RBC # BLD AUTO: 4.45 10E12/L (ref 3.8–5.2)
SODIUM SERPL-SCNC: 143 MMOL/L (ref 133–144)
TROPONIN I SERPL-MCNC: <0.015 UG/L (ref 0–0.04)
WBC # BLD AUTO: 5.2 10E9/L (ref 4–11)

## 2017-09-01 PROCEDURE — 80053 COMPREHEN METABOLIC PANEL: CPT | Performed by: FAMILY MEDICINE

## 2017-09-01 PROCEDURE — 93010 ELECTROCARDIOGRAM REPORT: CPT | Mod: Z6 | Performed by: FAMILY MEDICINE

## 2017-09-01 PROCEDURE — 71260 CT THORAX DX C+: CPT

## 2017-09-01 PROCEDURE — 83880 ASSAY OF NATRIURETIC PEPTIDE: CPT | Performed by: FAMILY MEDICINE

## 2017-09-01 PROCEDURE — 96374 THER/PROPH/DIAG INJ IV PUSH: CPT | Performed by: FAMILY MEDICINE

## 2017-09-01 PROCEDURE — 71010 XR CHEST PORT 1 VW: CPT

## 2017-09-01 PROCEDURE — 84484 ASSAY OF TROPONIN QUANT: CPT | Performed by: FAMILY MEDICINE

## 2017-09-01 PROCEDURE — 96361 HYDRATE IV INFUSION ADD-ON: CPT | Performed by: FAMILY MEDICINE

## 2017-09-01 PROCEDURE — 25000125 ZZHC RX 250: Performed by: FAMILY MEDICINE

## 2017-09-01 PROCEDURE — 85379 FIBRIN DEGRADATION QUANT: CPT | Performed by: FAMILY MEDICINE

## 2017-09-01 PROCEDURE — 85730 THROMBOPLASTIN TIME PARTIAL: CPT | Performed by: FAMILY MEDICINE

## 2017-09-01 PROCEDURE — 99285 EMERGENCY DEPT VISIT HI MDM: CPT | Mod: 25 | Performed by: FAMILY MEDICINE

## 2017-09-01 PROCEDURE — 25000128 H RX IP 250 OP 636: Performed by: RADIOLOGY

## 2017-09-01 PROCEDURE — 25000132 ZZH RX MED GY IP 250 OP 250 PS 637: Performed by: FAMILY MEDICINE

## 2017-09-01 PROCEDURE — 85610 PROTHROMBIN TIME: CPT | Performed by: FAMILY MEDICINE

## 2017-09-01 PROCEDURE — 85025 COMPLETE CBC W/AUTO DIFF WBC: CPT | Performed by: FAMILY MEDICINE

## 2017-09-01 PROCEDURE — 25000128 H RX IP 250 OP 636: Performed by: FAMILY MEDICINE

## 2017-09-01 RX ORDER — OXYCODONE HYDROCHLORIDE 5 MG/1
10 TABLET ORAL ONCE
Status: COMPLETED | OUTPATIENT
Start: 2017-09-01 | End: 2017-09-01

## 2017-09-01 RX ORDER — MORPHINE SULFATE 30 MG/1
30 TABLET, FILM COATED, EXTENDED RELEASE ORAL ONCE
Status: COMPLETED | OUTPATIENT
Start: 2017-09-01 | End: 2017-09-01

## 2017-09-01 RX ORDER — OXYCODONE HYDROCHLORIDE 5 MG/1
5-10 TABLET ORAL EVERY 6 HOURS PRN
Qty: 30 TABLET | Refills: 0 | Status: SHIPPED | OUTPATIENT
Start: 2017-09-01 | End: 2017-09-06

## 2017-09-01 RX ORDER — HYDROMORPHONE HYDROCHLORIDE 1 MG/ML
0.5 INJECTION, SOLUTION INTRAMUSCULAR; INTRAVENOUS; SUBCUTANEOUS ONCE
Status: COMPLETED | OUTPATIENT
Start: 2017-09-01 | End: 2017-09-01

## 2017-09-01 RX ORDER — IOPAMIDOL 755 MG/ML
66 INJECTION, SOLUTION INTRAVASCULAR ONCE
Status: COMPLETED | OUTPATIENT
Start: 2017-09-01 | End: 2017-09-01

## 2017-09-01 RX ORDER — ASPIRIN 81 MG/1
324 TABLET, CHEWABLE ORAL ONCE
Status: COMPLETED | OUTPATIENT
Start: 2017-09-01 | End: 2017-09-01

## 2017-09-01 RX ORDER — LIDOCAINE 40 MG/G
CREAM TOPICAL
Status: DISCONTINUED | OUTPATIENT
Start: 2017-09-01 | End: 2017-09-01 | Stop reason: HOSPADM

## 2017-09-01 RX ADMIN — IOPAMIDOL 66 ML: 755 INJECTION, SOLUTION INTRAVENOUS at 18:35

## 2017-09-01 RX ADMIN — OXYCODONE HYDROCHLORIDE 10 MG: 5 TABLET ORAL at 19:51

## 2017-09-01 RX ADMIN — ASPIRIN 81 MG CHEWABLE TABLET 324 MG: 81 TABLET CHEWABLE at 16:17

## 2017-09-01 RX ADMIN — SODIUM CHLORIDE 1000 ML: 9 INJECTION, SOLUTION INTRAVENOUS at 17:57

## 2017-09-01 RX ADMIN — MORPHINE SULFATE 30 MG: 30 TABLET, EXTENDED RELEASE ORAL at 18:11

## 2017-09-01 RX ADMIN — HYDROMORPHONE HYDROCHLORIDE 0.5 MG: 1 INJECTION, SOLUTION INTRAMUSCULAR; INTRAVENOUS; SUBCUTANEOUS at 17:57

## 2017-09-01 RX ADMIN — LIDOCAINE HYDROCHLORIDE 30 ML: 20 SOLUTION ORAL; TOPICAL at 16:17

## 2017-09-01 ASSESSMENT — ENCOUNTER SYMPTOMS
ABDOMINAL PAIN: 0
CHILLS: 0
NAUSEA: 0
COLOR CHANGE: 0
DIFFICULTY URINATING: 0
DIAPHORESIS: 0
SHORTNESS OF BREATH: 1
PALPITATIONS: 0
COUGH: 0
EYE REDNESS: 0
FEVER: 0
HEADACHES: 0
ARTHRALGIAS: 0
NECK STIFFNESS: 0
VOMITING: 0
CONFUSION: 0

## 2017-09-01 NOTE — TELEPHONE ENCOUNTER
"Called patient to check in at request of Dr. Crawley following a call to nurse triage yesterday (see telephone note). She tells me that it still \"really hurts.\" She states no one called her back, when I advised that a message was left she states she never received it. Advised that Dr. Crawley had recommended she be evaluated in the ED for the new pain, she tells me that she hates the ED and prefers not to.     When I ask about the pain she tells me it is in the center of her chest, in her intercostal muscle area. She describes it as in the base of the intercostal space and states it is \"sharp,\" \"stabbing,\" \"tearing,\" and \"knifelike.\" She tells me this is unlike her \"usual pain\" and has been going on since Tuesday at 3AM (when she states she called the first time) and is not any better. She states that this pain is worse with any breathing, especially deep breathing. She notes that she just walked about a 1/2 block and is \"miserable\" and in a lot of pain.     Reiterated Dr. Crawley' recommendation of evaluation in the ED - she tells me that she will speak to her coworkers and try to get off work to go. Updated Dr. Crawley and Dr. Benitez.    "

## 2017-09-01 NOTE — ED PROVIDER NOTES
History     Chief Complaint   Patient presents with     Chest Pain     HPI  Tisha Arias is a 56 year old female with a history of asthma, breast cancer s/p mastectomy, s/p Davinci hysterectomy, migraines, kidney stones, and gastric bypass w/cholecystectomy who presents for evaluation of chest pain. Patient complains of sudden onset chest pain that began on Tuesday, three days ago, with associated shortness of breath on deep inhalation. Her chest pain worsens with activity and laying flat. She denies fever, chills, diaphoresis, palpitations, leg pain or swelling. No cough or recent URI symptoms. She has not taken any medications for her pain. No history of similar symptoms.   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  Patient does note some pain in the back also wrapping around chest.  No nausea vomiting.  No rash noted no coughing fevers or chills.  No leg pain leg swelling no history of clots.  Patient been trying home medications without relief.    I have reviewed the Medications, Allergies, Past Medical and Surgical History, and Social History in the [facility] system.  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 No real need for Epipen for this alone.     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 for: dog/M/T/G/W--NEGATIVE FOOD TESTS FOR: shrimp, crab, lobster, coconut       Past Surgical History:   Procedure Laterality Date     BACK SURGERY  ,    Bulging disc w nerve root impigment     BIOPSY BREAST      right     BIOPSY BREAST  11    right, core and sterotactic     BYPASS GASTRIC, CHOLECYSTECTOMY, COMBINED       C LAPAROSCOPIC GASTRIC RESTRICTIVE PX, W/GASTRIC BYPASS/ GAMALIEL-EN-Y, < 150CM      Gamaliel en Y?      SECTION       COLONOSCOPY      normal     COSMETIC SURGERY  2012    Final Stage of Mastectomy     DAVINCI HYSTERECTOMY TOTAL, BILATERAL SALPINGO-OOPHORECTOMY, COMBINED  2012    Procedure: COMBINED DAVINCI HYSTERECTOMY TOTAL, SALPINGO-OOPHORECTOMY;  Davinci Total Laparoscopic Hysterectomy, Bilateral Salpingo Oophorectomy, Pelvic Washings, Cystoscopy;  Surgeon: Evie Sheikh MD;  Location: [facility] OR     ENT SURGERY       GI SURGERY  2007    Gamaliel-en Y w cholecystectomy     GYN SURGERY  89,1/10/92    2 c-sections     HYSTERECTOMY, PAP NO LONGER INDICATED      DeVinci assister lap hyst with BSO     IRRIGATION AND DEBRIDEMENT BREAST  3/1/2012    Procedure:IRRIGATION AND DEBRIDEMENT BREAST; Irrigation and Debridement, Wound Closure Right Breast; Surgeon:JUNG GUILLEN; Location:[facility] OR     MASTECTOMY, RECONSTRUCT BREAST, COMBINED  2012    Procedure:COMBINED MASTECTOMY, RECONSTRUCT BREAST; Bilateral Mastectomies, Right Axillary Troy Node Biopsy, Bilateral Breast Reconstruction with Tissue Expanders, Reconstruction of inframammary fold, bilateral pain management systems; Surgeon:ANUPAM CAMPBELL; Location:[facility] OR     RECONSTRUCT BREAST  2012    Procedure: RECONSTRUCT BREAST;  Bilateral 2nd stage breast reconstruction,  revision,       SOFT TISSUE SURGERY  1-30-12    Mastectomy w severe myofascial pain syndrome     THYROIDECTOMY  7/10/2013    Procedure: THYROIDECTOMY;  Total Thyroidectomy with central neck dissection;  Surgeon: Indiana Sneed MD;  Location: [facility] OR     TONSILLECTOMY      childhood       Family History   Problem Relation Age of Onset     Allergies Mother      Arthritis Mother      CANCER Mother      uterine/uterine     Hypertension Mother      on HCTZ     Hyperlipidemia Mother      CEREBROVASCULAR DISEASE Mother      s/p brain surgery     Breast Cancer Mother      Depression Mother      Anxiety Disorder Mother      Anesthesia Reaction Mother      Asthma Mother      Skin Cancer Mother      HEART DISEASE Father      DIABETES Father      Coronary Artery Disease Father      Hypertension Father      Hyperlipidemia Father      CEREBROVASCULAR DISEASE Father      Multiple strokes     Obesity Father      DIABETES Brother      Depression Brother      Hypertension Brother      CANCER Brother 57     pancreatic cancer     CANCER Maternal Grandfather      pancreatic cancer/stomach cancer     Prostate Cancer Maternal Grandfather      Prostrate and Bladder     Other Cancer Maternal Grandfather      Pancreatic 1988, Bladder 1977     CANCER Maternal Grandmother      uterine     Breast Cancer Maternal Grandmother      Skin Cancer Maternal Grandmother      DIABETES Brother      Breast Cancer Cousin      Grand mothers sister     Other Cancer Brother      Stage 3 Pancreatic 2-2015     Depression Brother      Asthma Brother      Obesity Brother      Anesthesia Reaction Other      H/a, itching, drug interactions     Asthma Other      Thyroid Disease No family hx of      CANCER Brother      Pancreatic        Social History   Substance Use Topics     Smoking status: Never Smoker     Smokeless tobacco: Never Used      Comment: no 2nd hand smoke exposure     Alcohol use Yes      Comment: rare       No current facility-administered  medications for this encounter.      Current Outpatient Prescriptions   Medication     oxyCODONE (ROXICODONE) 5 MG IR tablet     morphine (MS CONTIN) 30 MG 12 hr tablet     morphine (MS CONTIN) 15 MG 12 hr tablet     SUMAtriptan (IMITREX) 50 MG tablet     COLCRYS 0.6 MG tablet     celecoxib (CELEBREX) 200 MG capsule     montelukast (SINGULAIR) 10 MG tablet     chlorhexidine (PERIDEX) 0.12 % solution     HYDROcodone-acetaminophen (NORCO) 5-325 MG per tablet     Colchicine 0.6 MG CAPS     EPINEPHrine (EPIPEN 2-CARIDAD) 0.3 MG/0.3ML injection     methocarbamol (ROBAXIN) 750 MG tablet     diazepam (VALIUM) 5 MG tablet     potassium chloride SA (POTASSIUM CHLORIDE) 20 MEQ CR tablet     VENTOLIN  (90 BASE) MCG/ACT Inhaler     cromolyn (OPTICROM) 4 % ophthalmic solution     NASALCROM 5.2 MG/ACT AERS Inhaler     Cholecalciferol (VITAMIN D3) 2000 UNITS CAPS     levothyroxine (SYNTHROID/LEVOTHROID) 112 MCG tablet     ondansetron (ZOFRAN ODT) 4 MG ODT tab     COMPOUND (CMPD RX) - PHARMACY TO MIX COMPOUNDED MEDICATION     lidocaine (XYLOCAINE) 5 % ointment     order for DME     order for DME     ranitidine (ZANTAC) 75 MG tablet     tamoxifen (NOLVADEX) 20 MG tablet     Lidocaine HCl Monohydrate POWD     calcitRIOL (ROCALTROL) 0.25 MCG capsule     hydrochlorothiazide (MICROZIDE) 12.5 MG capsule     aluminum chloride (DRYSOL) 20 % external solution     tacrolimus (PROTOPIC) 0.1 % ointment     triamcinolone (KENALOG) 0.025 % ointment     UNABLE TO FIND     cetirizine (ZYRTEC ALLERGY) 10 MG tablet     diclofenac (VOLTAREN) 1 % GEL     mometasone (ASMANEX 30 METERED DOSES) 110 MCG/INH inhaler     UNABLE TO FIND     MAGNESIUM CITRATE PO     DiphenhydrAMINE HCl (BENADRYL PO)     calcium citrate-vitamin D (CALCIUM CITRATE +D) 315-250 MG-UNIT TABS     calcium carbonate (TUMS) 500 MG chewable tablet     UNABLE TO FIND     Digestive Enzymes (BETAINE HCL PO)     Triamcinolone Acetonide (AZMACORT IN)     UNABLE TO FIND     UNABLE TO FIND      cyclobenzaprine (FLEXERIL) 10 MG tablet     Omega-3 Fatty Acids (OMEGA 3 PO)     ACAI PO     Probiotic Product (PROBIOTIC DAILY PO)     polyethylene glycol (MIRALAX) powder     docusate sodium 100 MG tablet        Allergies   Allergen Reactions     No Clinical Screening - See Comments Shortness Of Breath, Palpitations, Anaphylaxis, Itching, Swelling, Difficulty breathing and Rash     Sukhjinder wipes- oral allergy -  July 2015: throat tightness from a Chinese herbal medicine Wilmer Tran     Amitriptyline Hcl Swelling     Baclofen      Birch Trees      Potatoes, carrots, cherries, celery, apple, pears, plums, peaches, parsnip, kiwi, hazelnuts, and apricots,      Blue Dyes Itching     Headaches       Cymbalta Other (See Comments)     Flushing, tremor/muscle twitching and edema     Gabapentin      edema     Grass      Mugwort [Artemisia Vulgaris]      Various spices     Nortriptyline Itching, Visual Disturbance, Swelling and GI Disturbance     Ragweeds      Melons, bananas, cucumbers, zucchini.     Topamax      Serotonin Anxiety and Swelling       Review of Systems   Constitutional: Positive for activity change (limited with movement precipitating symptoms). Negative for appetite change, chills, diaphoresis and fever.   HENT: Negative for congestion, trouble swallowing and voice change.    Eyes: Negative for redness.   Respiratory: Positive for shortness of breath. Negative for cough.    Cardiovascular: Positive for chest pain (x3 days). Negative for palpitations and leg swelling.   Gastrointestinal: Negative for abdominal pain, nausea and vomiting.   Genitourinary: Negative for difficulty urinating.   Musculoskeletal: Positive for back pain (pain in the back lower chest area also) and myalgias. Negative for arthralgias, joint swelling, neck pain and neck stiffness.   Skin: Negative for color change and rash.   Allergic/Immunologic: Negative for immunocompromised state.   Neurological: Negative for weakness,  "numbness and headaches.   Psychiatric/Behavioral: Positive for decreased concentration, dysphoric mood and sleep disturbance (secondary to pain). Negative for confusion.   All other systems reviewed and are negative.      Physical Exam   BP: (!) 177/93  Pulse: 129  Temp: 98.9  F (37.2  C)  Resp: 18  Height: 165.1 cm (5' 5\")  Weight: 89 kg (196 lb 3.2 oz)  SpO2: 98 %  Physical Exam   Constitutional: She is oriented to person, place, and time. She appears distressed.   Patient is pleasant here in the ER is in moderate distress at this point because of pain   HENT:   Head: Atraumatic.   Mouth/Throat: Oropharynx is clear and moist. No oropharyngeal exudate.   Eyes: Pupils are equal, round, and reactive to light. No scleral icterus.   Cardiovascular: Regular rhythm, normal heart sounds and intact distal pulses.    Pulmonary/Chest: Breath sounds normal. No stridor. No respiratory distress. She exhibits tenderness (patient does have some costochondral tendernes with some posterior lower rib tenderness).   Abdominal: Soft. Bowel sounds are normal. There is no tenderness. There is no rebound and no guarding.   Musculoskeletal: She exhibits no edema, tenderness or deformity.   Negative Homans   Neurological: She is alert and oriented to person, place, and time. She has normal reflexes.   Skin: Skin is warm. No rash noted. She is not diaphoretic.   Psychiatric:   Patient appropriate here just slightly uncomfortable because of pain   Nursing note and vitals reviewed.      ED Course     ED Course     Procedures       2:40 PM  The patient was seen and examined by Dr. Pressley in Room [facility].   Evaluation chest x-rays done revealing low lung volumes with some atelectasis seen no effusions pneumothorax or other abnormality seen.  EKG sinus tachycardia.  No hyperacute ischemic changes.  Labs are evaluated BNP 59 troponin negative d-dimer 0.5 creatinine 0.82 glucose 117 white count 5.2 hemoglobin 11 platelets 225  Patient given 324 mg " chewable aspirin in the ER patient given a GI cocktail subtle changes 1 L normal saline bolus given patient given Dilaudid 0.5 mill grams IV with some improvement.  Patient also given her 30 mg of MS Contin orally which she missed earlier today.    Discussed with patient regarding findings etc. we did do a CT scan which findings reveals no PE but left axillary reactive lymph nodes noted also concerning lesion at the right posterior rib #10.  This is where she is tender.    Discussed with oncology also recommendations for early follow-up next week patient was given oxycodone 10 mg orally in the ER I did write a prescription for the oxycodone 5 mg which she can also take for breakthrough pain she's tried Lidoderm patches without relief in the past.  She does have some other topical agents she can use. Patient will call her oncologist Tuesday they will also contact her.  If any problems she should return.  Patient agrees with plan and comfortable being discharged.             EKG Interpretation:      Interpreted by Sahil Pressley MD  Time reviewed: 13:59  Symptoms at time of EKG: chest pain   Rhythm: sinus tachycardia  Rate: 116 bpm  Axis: Normal  Ectopy: none  Conduction: normal  ST Segments/ T Waves: Non-specific ST abnormality  Q Waves: none  Comparison to prior: Unchanged from 12/07/15    Clinical Impression: Sinus tachycardia      Critical Care time:  none           Labs Ordered and Resulted from Time of ED Arrival Up to the Time of Departure from the ED   CBC WITH PLATELETS DIFFERENTIAL - Abnormal; Notable for the following:        Result Value    Hemoglobin 11.0 (*)     MCH 24.7 (*)     MCHC 30.6 (*)     RDW 15.3 (*)     All other components within normal limits   COMPREHENSIVE METABOLIC PANEL - Abnormal; Notable for the following:     Glucose 117 (*)     All other components within normal limits   INR   PARTIAL THROMBOPLASTIN TIME   D DIMER QUANTITATIVE   TROPONIN I   NT PROBNP INPATIENT   PULSE OXIMETRY NURSING    CARDIAC CONTINUOUS MONITORING   PERIPHERAL IV CATHETER     Results for orders placed or performed during the hospital encounter of 09/01/17   XR Chest Port 1 View    Narrative    XR CHEST PORT 1 VW  9/1/2017 3:10 PM      HISTORY: cp    COMPARISON: CT chest 5/28/2013    FINDINGS: Single portable AP view of the chest upright. The  cardiomediastinal silhouette and pulmonary vasculature are stable and  within normal limits. Slightly low lung volumes. Streaky linear  opacities in the right base and left mid lung field. There is no focal  airspace consolidation, pleural effusion, or pneumothorax. The  visualized upper abdomen, osseous structures, and soft tissues are  unremarkable.      Impression    IMPRESSION: Low lung volumes and streaky linear opacities most  consistent with atelectasis.    I have personally reviewed the examination and initial interpretation  and I agree with the findings.    JORDAN POST MD   Chest CT, IV contrast only - PE protocol   Result Value Ref Range    Radiologist flags New destructive right 10th rib lesion (Urgent)     Narrative    Exam: Chest CT Pulmonary Angiogram, 9/1/2017 6:57 PM..    History: chest pain with hx of cancer  eval for PE.    Comparison: 5/28/2013.    Technique: Axial thin slice images from the lung apices to the  diaphragm were obtained following the injection of intravenous  contrast media in the pulmonary arterial phase. Three-dimensional (3D)  post-processed angiographic images were reconstructed, archived to  PACS and used in the interpretation of this study.    Total DLP: 237 mGy*cm.    Findings: There is adequate contrast bolus in the study with good  opacification of the pulmonary arteries to the segmental level. No  evidence of PE or right heart strain. Normal caliber of the aorta and  pulmonary artery. No aortic dissection.    Prominent subcarinal soft tissue which measures 6 x 2.6 cm, previously  3 x 1.8 cm with extension along the bronchi and pulmonary  veins and  nodular thickening along the aorta, series 4 image 44  Right  pretracheal lymph node measuring 10 mm, previously 7 mm. There is a  slightly increased left pectoral lymph node (series 9, image 64, and  several prominent but not enlarged lymph nodes in the left axilla. No  significant axillary lymphadenopathy on the right. Enlarged left  internal mammary lymph nodes, series 9 image 74. Mild diffuse  esophageal wall thickening.    Lungs: Bilateral anterior linear atelectasis. Diffuse geographic  mosaic attenuation and scattered areas of atelectasis most prominently  in the lingula and right middle lobe. No focal consolidation. No  pneumothorax or pleural effusion.    Upper abdomen: Postoperative changes of gastric bypass. Limited  evaluation of the upper abdomen is otherwise grossly unremarkable.    Bones: There is a fairly prominent destructive soft tissue mass  involving the medial aspect of the right 10th rib, extending to the  neural foramina and likely invading the right lateral aspect of the  T10 vertebral body pedicle and transverse process. This measures 2.2 x  3.2 cm in maximum axial dimension and 2.9 cm craniocaudally. There is  a stable lesion in the lateral aspect of T10 which is stable and most  compatible with a hemangioma.      Impression    Impression:  1. No pulmonary embolism present.  2. New 3.2 x 2.3 destructive lesion in the medial right 10th rib  extending to the right T9/T10 neural foramen with likely adjacent  pedicular and vertebral body invasion.    3. Increased subcarinal soft tissue, likely conglomerate  lymphadenopathy, concerning for malignancy.  4. Several prominent but not technically enlarged lymph nodes in the  left axilla with enlargement of left internal mammary lymph nodes.  5. Diffuse mosaic attenuation as may be seen with small vessel and/or  small airway disease.    [Urgent Result: New destructive right 10th rib lesion]    Finding was identified on 9/1/2017 6:54 PM.      Dr. Pressley was contacted by Dr. Arnold at 9/1/2017 7:15 PM and  verbalized understanding of the urgent finding.     I have personally reviewed the examination and initial interpretation  and I agree with the findings.    WENCESLAO YOO MD   CBC with platelets differential   Result Value Ref Range    WBC 5.2 4.0 - 11.0 10e9/L    RBC Count 4.45 3.8 - 5.2 10e12/L    Hemoglobin 11.0 (L) 11.7 - 15.7 g/dL    Hematocrit 35.9 35.0 - 47.0 %    MCV 81 78 - 100 fl    MCH 24.7 (L) 26.5 - 33.0 pg    MCHC 30.6 (L) 31.5 - 36.5 g/dL    RDW 15.3 (H) 10.0 - 15.0 %    Platelet Count 225 150 - 450 10e9/L    Diff Method Automated Method     % Neutrophils 63.6 %    % Lymphocytes 26.3 %    % Monocytes 8.3 %    % Eosinophils 1.2 %    % Basophils 0.4 %    % Immature Granulocytes 0.2 %    Nucleated RBCs 0 0 /100    Absolute Neutrophil 3.3 1.6 - 8.3 10e9/L    Absolute Lymphocytes 1.4 0.8 - 5.3 10e9/L    Absolute Monocytes 0.4 0.0 - 1.3 10e9/L    Absolute Eosinophils 0.1 0.0 - 0.7 10e9/L    Absolute Basophils 0.0 0.0 - 0.2 10e9/L    Abs Immature Granulocytes 0.0 0 - 0.4 10e9/L    Absolute Nucleated RBC 0.0    INR   Result Value Ref Range    INR 1.00 0.86 - 1.14   Partial thromboplastin time   Result Value Ref Range    PTT 26 22 - 37 sec   D dimer quantitative   Result Value Ref Range    D Dimer 0.5 0.0 - 0.50 ug/ml FEU   Comprehensive metabolic panel   Result Value Ref Range    Sodium 143 133 - 144 mmol/L    Potassium 3.6 3.4 - 5.3 mmol/L    Chloride 108 94 - 109 mmol/L    Carbon Dioxide 29 20 - 32 mmol/L    Anion Gap 7 3 - 14 mmol/L    Glucose 117 (H) 70 - 99 mg/dL    Urea Nitrogen 12 7 - 30 mg/dL    Creatinine 0.82 0.52 - 1.04 mg/dL    GFR Estimate 72 >60 mL/min/1.7m2    GFR Estimate If Black 87 >60 mL/min/1.7m2    Calcium 8.6 8.5 - 10.1 mg/dL    Bilirubin Total 0.4 0.2 - 1.3 mg/dL    Albumin 3.5 3.4 - 5.0 g/dL    Protein Total 7.6 6.8 - 8.8 g/dL    Alkaline Phosphatase 74 40 - 150 U/L    ALT 26 0 - 50 U/L    AST 23 0 - 45 U/L   Troponin  I   Result Value Ref Range    Troponin I ES <0.015 0.000 - 0.045 ug/L   Nt probnp inpatient (BNP)   Result Value Ref Range    N-Terminal Pro BNP Inpatient 59 0 - 900 pg/mL   EKG 12 lead   Result Value Ref Range    Interpretation ECG Click View Image link to view waveform and result      *Note: Due to a large number of results and/or encounters for the requested time period, some results have not been displayed. A complete set of results can be found in Results Review.            Assessments & Plan (with Medical Decision Making)  56-year-old female history of breast and thyroid cancer presents with chest pain for last 4 days with shortness of breath.  She has chest wall tenderness no history of cardiac or PEs.  Patient I believe is 5 years out from any significant cancer treatment.  EKG sinus tachycardia chest x-ray atelectasis labs troponin negative BNP negative d-dimer was 0.5.  No change with GI cocktail patient given some Dilaudid IV IV fluids CT scan of the chest shows no PE but reactive lymph nodes left axilla along with this a lesion posterior rib #10 on the right where she is tender concerning for possible metastatic in etiology patient informed of the findings discussed with oncology patient to follow-up early next week for further evaluation patient was given oxycodone for breakthrough pain and she return if any problems patient agrees plan.  She describes it in her belt like fashion this rib lesion can be certainly causing her symptoms at this point and will need further workup per oncology.           I have reviewed the nursing notes.    I have reviewed the findings, diagnosis, plan and need for follow up with the patient.    Discharge Medication List as of 9/1/2017  7:47 PM      START taking these medications    Details   oxyCODONE (ROXICODONE) 5 MG IR tablet Take 1-2 tablets (5-10 mg) by mouth every 6 hours as needed for pain, Disp-30 tablet, R-0, Local Print             Final diagnoses:   Chest wall  pain   Rib lesion   I, Sarah Rick, am serving as a trained medical scribe to document services personally performed by Sahil Pressley MD, based on the provider's statements to me.   I, Sahil Pressley MD, was physically present and have reviewed and verified the accuracy of this note documented by Sarah Rick.      9/1/2017   [facility], [place], EMERGENCY DEPARTMENT    This note was created at least in part by the use of dragon voice dictation system. Inadvertent typographical errors may still exist.  Sahil Pressley MD.         Sahil Pressley MD  09/03/17 0815

## 2017-09-01 NOTE — ED AVS SNAPSHOT
[facility], Emergency Department    [address] [address]    Phone:  [phone]                                       Tisha Arias   MRN: [id number]    Department:  [facility], Emergency Department   Date of Visit:  9/1/2017           Patient Information     Date Of Birth          1960        Your diagnoses for this visit were:     Chest wall pain     Rib lesion        You were seen by Sahil Pressley MD.      Follow-up Information     Follow up with Sahhla Crawley MD. Call in 3 days.    Specialty:  Hematology    Contact information:    [address]  [facility] [address]  [phone]          Discharge Instructions       Home.  No sign of PE but some nonspecific left axillary nodes noted and also concern for right posterior #10 rib changes needs further evaluation.  Take the oxycodone for pain also.  Call Oncology Clinic for follow up. They should call you also.  Return if any concerns.    Results for orders placed or performed during the hospital encounter of 09/01/17   XR Chest Port 1 View    Narrative    XR CHEST PORT 1 VW  9/1/2017 3:10 PM      HISTORY: cp    COMPARISON: CT chest 5/28/2013    FINDINGS: Single portable AP view of the chest upright. The  cardiomediastinal silhouette and pulmonary vasculature are stable and  within normal limits. Slightly low lung volumes. Streaky linear  opacities in the right base and left mid lung field. There is no focal  airspace consolidation, pleural effusion, or pneumothorax. The  visualized upper abdomen, osseous structures, and soft tissues are  unremarkable.      Impression    IMPRESSION: Low lung volumes and streaky linear opacities most  consistent with atelectasis.    I have personally reviewed the examination and initial interpretation  and I agree with the findings.    JORDAN POST MD              CBC with platelets differential   Result Value Ref Range    WBC 5.2 4.0 - 11.0 10e9/L    RBC Count 4.45  3.8 - 5.2 10e12/L    Hemoglobin 11.0 (L) 11.7 - 15.7 g/dL    Hematocrit 35.9 35.0 - 47.0 %    MCV 81 78 - 100 fl    MCH 24.7 (L) 26.5 - 33.0 pg    MCHC 30.6 (L) 31.5 - 36.5 g/dL    RDW 15.3 (H) 10.0 - 15.0 %    Platelet Count 225 150 - 450 10e9/L    Diff Method Automated Method     % Neutrophils 63.6 %    % Lymphocytes 26.3 %    % Monocytes 8.3 %    % Eosinophils 1.2 %    % Basophils 0.4 %    % Immature Granulocytes 0.2 %    Nucleated RBCs 0 0 /100    Absolute Neutrophil 3.3 1.6 - 8.3 10e9/L    Absolute Lymphocytes 1.4 0.8 - 5.3 10e9/L    Absolute Monocytes 0.4 0.0 - 1.3 10e9/L    Absolute Eosinophils 0.1 0.0 - 0.7 10e9/L    Absolute Basophils 0.0 0.0 - 0.2 10e9/L    Abs Immature Granulocytes 0.0 0 - 0.4 10e9/L    Absolute Nucleated RBC 0.0    INR   Result Value Ref Range    INR 1.00 0.86 - 1.14   Partial thromboplastin time   Result Value Ref Range    PTT 26 22 - 37 sec   D dimer quantitative   Result Value Ref Range    D Dimer 0.5 0.0 - 0.50 ug/ml FEU   Comprehensive metabolic panel   Result Value Ref Range    Sodium 143 133 - 144 mmol/L    Potassium 3.6 3.4 - 5.3 mmol/L    Chloride 108 94 - 109 mmol/L    Carbon Dioxide 29 20 - 32 mmol/L    Anion Gap 7 3 - 14 mmol/L    Glucose 117 (H) 70 - 99 mg/dL    Urea Nitrogen 12 7 - 30 mg/dL    Creatinine 0.82 0.52 - 1.04 mg/dL    GFR Estimate 72 >60 mL/min/1.7m2    GFR Estimate If Black 87 >60 mL/min/1.7m2    Calcium 8.6 8.5 - 10.1 mg/dL    Bilirubin Total 0.4 0.2 - 1.3 mg/dL    Albumin 3.5 3.4 - 5.0 g/dL    Protein Total 7.6 6.8 - 8.8 g/dL    Alkaline Phosphatase 74 40 - 150 U/L    ALT 26 0 - 50 U/L    AST 23 0 - 45 U/L   Troponin I   Result Value Ref Range    Troponin I ES <0.015 0.000 - 0.045 ug/L   Nt probnp inpatient (BNP)   Result Value Ref Range    N-Terminal Pro BNP Inpatient 59 0 - 900 pg/mL   EKG 12 lead   Result Value Ref Range    Interpretation ECG Click View Image link to view waveform and result      *Note: Due to a large number of results and/or encounters  for the requested time period, some results have not been displayed. A complete set of results can be found in Results Review.           Future Appointments        Provider Department Dept Phone Center    11/2/2017 5:00 PM [facility] IMAGING CENTER ULTRASOUND ROOM 3 [facility]  [phone] [facility]    11/2/2017 6:00 PM Lab [facility] [phone] [facility]    11/13/2017 4:30 PM Avelina Castro MD [facility] [phone] [facility]    11/27/2017 4:00 PM Shahla Crawley MD [facility] [phone] [facility]      24 Hour Appointment Hotline       To make an appointment at any [facility], call [phone]). If you don't have a family doctor or clinic, we will help you find one. [facility] clinics are conveniently located to serve the needs of you and your family.             Review of your medicines      START taking        Dose / Directions Last dose taken    oxyCODONE 5 MG IR tablet   Commonly known as:  ROXICODONE   Dose:  5-10 mg   Quantity:  30 tablet        Take 1-2 tablets (5-10 mg) by mouth every 6 hours as needed for pain   Refills:  0          Our records show that you are taking the medicines listed below. If these are incorrect, please call your family doctor or clinic.        Dose / Directions Last dose taken    ACAI PO   Dose:  1000 mg        Take 1,000 mg by mouth 2 times daily   Refills:  0        aluminum chloride 20 % external solution   Commonly known as:  DRYSOL   Quantity:  60 mL        Apply topically At Bedtime   Refills:  3        AZMACORT IN   Dose:  2 puff        Inhale 2 puffs into the lungs as needed   Refills:  0        BENADRYL PO   Dose:  50 mg        Take 50 mg by mouth as needed   Refills:  0        BETAINE HCL PO   Dose:  2 tablet        Take 2 tablets by mouth as needed Betaine HCL and Pepsin: Take 1-7 tabs by mouth daily, if burning take one tablet less once daily. Betaine HCL 1.04 g Pepsin 40 mg   Refills:  0         calcitRIOL 0.25 MCG capsule   Commonly known as:  ROCALTROL   Quantity:  270 capsule        2 tabs in am and 1 tab qhs   Refills:  3        CALCIUM CITRATE +D 315-250 MG-UNIT Tabs per tablet   Dose:  2 tablet   Quantity:  120 tablet   Generic drug:  calcium citrate-vitamin D        Take 2 tablets by mouth 3 times daily   Refills:  0        celecoxib 200 MG capsule   Commonly known as:  celeBREX   Dose:  200 mg   Quantity:  180 capsule        Take 1 capsule (200 mg) by mouth 2 times daily   Refills:  0        chlorhexidine 0.12 % solution   Commonly known as:  PERIDEX        Refills:  0        * Colchicine 0.6 MG Caps   Dose:  0.6 mg   Quantity:  270 capsule        Take 0.6 mg by mouth 3 times daily for costochondritis (chest) discomfort. Scale back use to prn with loose stools or bloating.   Refills:  1        * COLCRYS 0.6 MG tablet   Dose:  1.6 mg   Generic drug:  colchicine        Take 1.6 mg by mouth 3 times daily   Refills:  1        COMPOUNDED NON-CONTROLLED SUBSTANCE - PHARMACY TO MIX COMPOUNDED MEDICATION   Commonly known as:  CMPD RX   Quantity:  120 g        Apply small amount to affected area two times daily.Ketamine 6% + ketoprofen 10% + lidocaine 10% in Lipo.   Refills:  6        cromolyn 4 % ophthalmic solution   Commonly known as:  OPTICROM   Dose:  1 drop   Quantity:  10 mL        Place 1 drop into both eyes 4 times daily   Refills:  5        cyclobenzaprine 10 MG tablet   Commonly known as:  FLEXERIL   Dose:  10 mg        Take 10 mg by mouth 3 times daily as needed On hold Dr Monsalve   Refills:  5        diazepam 5 MG tablet   Commonly known as:  VALIUM   Dose:  5 mg   Quantity:  90 tablet        Take 1 tablet (5 mg) by mouth 3 times daily as needed For muscle spasms   Refills:  2        diclofenac 1 % Gel topical gel   Commonly known as:  VOLTAREN   Quantity:  100 g        Apply affected area two times daily PRN using enclosed dosing card.   Refills:  1        docusate sodium 100 MG tablet    Commonly known as:  COLACE   Dose:  100 mg   Quantity:  60 tablet        Take 100 mg by mouth daily   Refills:  1        EPINEPHrine 0.3 MG/0.3ML injection 2-pack   Commonly known as:  EPIPEN 2-CARIDAD   Dose:  0.3 mg   Quantity:  0.6 mL        Inject 0.3 mLs (0.3 mg) into the muscle once as needed for anaphylaxis   Refills:  3        hydrochlorothiazide 12.5 MG capsule   Commonly known as:  MICROZIDE   Dose:  12.5 mg   Quantity:  90 capsule        Take 1 capsule (12.5 mg) by mouth daily   Refills:  3        HYDROcodone-acetaminophen 5-325 MG per tablet   Commonly known as:  NORCO        TK 1 T PO Q 4 TO 6 H PRN P   Refills:  0        levothyroxine 112 MCG tablet   Commonly known as:  SYNTHROID/LEVOTHROID   Quantity:  189 tablet        Monday-Saturday: (2 tablet/day);  Sunday: (3 tablet)   Refills:  3        lidocaine 5 % ointment   Commonly known as:  XYLOCAINE   Quantity:  35.44 g        SANJANA TOPICALLY QID PRN   Refills:  3        Lidocaine HCl Monohydrate Powd        Refills:  0        MAGNESIUM CITRATE PO   Dose:  400 mg   Indication:  up to 3 times daily        Take 400 mg by mouth daily   Refills:  0        methocarbamol 750 MG tablet   Commonly known as:  ROBAXIN   Dose:  750 mg   Quantity:  360 tablet        Take 1 tablet (750 mg) by mouth 4 times daily as needed . Ok to take a 5th Robaxin on very severe days.   Refills:  1        mometasone 110 MCG/INH inhaler   Commonly known as:  ASMANEX 30 METERED DOSES   Dose:  1 puff   Quantity:  3 Inhaler        Inhale 1 puff into the lungs daily   Refills:  3        montelukast 10 MG tablet   Commonly known as:  SINGULAIR   Quantity:  120 tablet        TAKE TWO TABLETS BY MOUTH TWICE DAILY   Refills:  11        * morphine 30 MG 12 hr tablet   Commonly known as:  MS CONTIN   Quantity:  60 tablet        1 tab (30mg) in the morning and in the afternoon + 15mg at bedtime   Refills:  0        * morphine 15 MG 12 hr tablet   Commonly known as:  MS CONTIN   Quantity:  30  tablet        1 tab (15mg) at bedtime.   Refills:  0        NASALCROM 5.2 MG/ACT Aers Inhaler   Quantity:  1 Bottle   Generic drug:  cromolyn sodium        SPRAY ONE SPRAY( 1 ML) IN NOSTRIL DAILY   Refills:  6        OMEGA 3 PO   Dose:  5 mL        Take 5 mLs by mouth   Refills:  0        ondansetron 4 MG ODT tab   Commonly known as:  ZOFRAN ODT   Dose:  4-8 mg   Quantity:  60 tablet        Take 1-2 tablets (4-8 mg) by mouth every 8 hours as needed for nausea or vomiting   Refills:  6        * order for DME   Quantity:  2 Units        Equipment being ordered: compression stockings. 20-30 mm or 30 - 40 mm as patient can tolerate. Physical therapy to determine if they should be above or below the knee.   Refills:  4        * order for DME   Quantity:  2 Device        Equipment being ordered: compression bra   Refills:  1        polyethylene glycol powder   Commonly known as:  MIRALAX   Dose:  1 capful   Quantity:  510 g        Take 17 g by mouth daily   Refills:  1        potassium chloride SA 20 MEQ CR tablet   Commonly known as:  potassium chloride   Dose:  20 mEq   Quantity:  90 tablet        Take 1 tablet (20 mEq) by mouth daily   Refills:  3        PROBIOTIC DAILY PO   Dose:  1 capsule        Take 1 capsule by mouth daily Lacto acid bifidobacterium   Refills:  0        ranitidine 75 MG tablet   Commonly known as:  ZANTAC   Dose:  75 mg   Quantity:  270 tablet        Take 1 tablet (75 mg) by mouth 3 times daily   Refills:  3        SUMAtriptan 50 MG tablet   Commonly known as:  IMITREX   Dose:  50 mg   Quantity:  5 tablet        Take 1 tablet (50 mg) by mouth at onset of headache for migraine (may repeat in 2 hours as needed, max 2 tablets daily)   Refills:  3        tacrolimus 0.1 % ointment   Commonly known as:  PROTOPIC   Quantity:  60 g        Apply topically 2 times daily   Refills:  3        tamoxifen 20 MG tablet   Commonly known as:  NOLVADEX   Dose:  20 mg   Quantity:  90 tablet        Take 1 tablet (20  mg) by mouth daily   Refills:  3        triamcinolone 0.025 % ointment   Commonly known as:  KENALOG   Quantity:  80 g        Apply topically 2 times daily Mix with 1 lb jar of vaseline or aquaphor   Refills:  3        TUMS 500 MG chewable tablet   Dose:  1.5 chew tab   Quantity:  180 chew tab   Generic drug:  calcium carbonate        Take 2 tablets (1,000 mg) by mouth nightly as needed for other (cramps)   Refills:  3        * UNABLE TO FIND   Dose:  1 tablet        1 tablet 3 times daily MEDICATION NAME: calcium D-Glucarate   Refills:  0        * UNABLE TO FIND   Dose:  2 tablet   Indication:  On hold for drug testing        2 tablets 3 times daily MEDICATION NAME: Natural D-Hist   Refills:  0        * UNABLE TO FIND        MEDICATION NAME: Tumeric 3 capsules daily   Refills:  0        * UNABLE TO FIND   Dose:  1 tablet        1 tablet 3 times daily MEDICATION NAME: Digestzymes   Refills:  0        * UNABLE TO FIND   Dose:  1 tablet   Indication:  P5P50        1 tablet daily MEDICATION NAME: Pure Encapsulations   Refills:  0        VENTOLIN  (90 BASE) MCG/ACT Inhaler   Quantity:  18 g   Generic drug:  albuterol        INHALE 2 PUFFS INTO THE LUNGS EVERY 4 HOURS AS NEEDED FOR SHORTNESS OF BREATH OR DIFFICULT BREATHING OR WHEEZING   Refills:  3        vitamin D3 2000 UNITS Caps   Dose:  82075 Units   Quantity:  60 capsule        Take 10,000 Units by mouth daily   Refills:  4        ZYRTEC ALLERGY 10 MG tablet   Dose:  10 mg   Quantity:  90 tablet   Generic drug:  cetirizine        Take 1 tablet (10 mg) by mouth 3 times daily On hold for lab test.   Refills:  6        * Notice:  This list has 11 medication(s) that are the same as other medications prescribed for you. Read the directions carefully, and ask your doctor or other care provider to review them with you.            Prescriptions were sent or printed at these locations (1 Prescription)                   Other Prescriptions                Printed at  Department/Unit printer (1 of 1)         oxyCODONE (ROXICODONE) 5 MG IR tablet                Procedures and tests performed during your visit     CBC with platelets differential    Cardiac Continuous Monitoring    Chest CT, IV contrast only - PE protocol    Comprehensive metabolic panel    D dimer quantitative    EKG 12 lead    INR    Nt probnp inpatient (BNP)    Partial thromboplastin time    Peripheral IV catheter    Pulse oximetry nursing    Troponin I    XR Chest Port 1 View      Orders Needing Specimen Collection     None      Pending Results     Date and Time Order Name Status Description    9/1/2017 1732 Chest CT, IV contrast only - PE protocol Preliminary     9/1/2017 1354 EKG 12 lead Preliminary             Pending Culture Results     No orders found from 8/30/2017 to 9/2/2017.            Pending Results Instructions     If you had any lab results that were not finalized at the time of your Discharge, you can call the ED Lab Result RN at [phone]. You will be contacted by this team for any positive Lab results or changes in treatment. The nurses are available 7 days a week from 10A to 6:30P.  You can leave a message 24 hours per day and they will return your call.        Thank you for choosing [facility]       Thank you for choosing [facility] for your care. Our goal is always to provide you with excellent care. Hearing back from our patients is one way we can continue to improve our services. Please take a few minutes to complete the written survey that you may receive in the mail after you visit with us. Thank you!        [facility] Information     [facility] gives you secure access to your electronic health record. If you see a primary care provider, you can also send messages to your care team and make appointments. If you have questions, please call your primary care clinic.  If you do not have a primary care provider, please call [phone] and they will assist you.        Care EveryWhere ID     This is  your Care EveryWhere ID. This could be used by other organizations to access your [facility] medical records  [id number]        Equal Access to Services     RODRIGO ZAMORA : Kevin Venegas, girish ge, timmy lama, ranjan martin. So [facility] [phone].    ATENCIÓN: Si habla español, tiene a stiles disposición servicios gratuitos de asistencia lingüística. Llame al [phone].    We comply with applicable federal civil rights laws and [place] laws. We do not discriminate on the basis of race, color, national origin, age, disability sex, sexual orientation or gender identity.            After Visit Summary       This is your record. Keep this with you and show to your community pharmacist(s) and doctor(s) at your next visit.

## 2017-09-01 NOTE — ED AVS SNAPSHOT
[facility], [place], Emergency Department    [address] [place] [address]    Phone:  [phone]                                       Tisha Arias   MRN: [id number]    Department:  [facility], Emergency Department   Date of Visit:  9/1/2017           After Visit Summary Signature Page     I have received my discharge instructions, and my questions have been answered. I have discussed any challenges I see with this plan with the nurse or doctor.    ..........................................................................................................................................  Patient/Patient Representative Signature      ..........................................................................................................................................  Patient Representative Print Name and Relationship to Patient    ..................................................               ................................................  Date                                            Time    ..........................................................................................................................................  Reviewed by Signature/Title    ...................................................              ..............................................  Date                                                            Time

## 2017-09-02 LAB — INTERPRETATION ECG - MUSE: NORMAL

## 2017-09-02 NOTE — DISCHARGE INSTRUCTIONS
Home.  No sign of PE but some nonspecific left axillary nodes noted and also concern for right posterior #10 rib changes needs further evaluation.  Take the oxycodone for pain also.  Call Oncology Clinic for follow up. They should call you also.  Return if any concerns.    Results for orders placed or performed during the hospital encounter of 09/01/17   XR Chest Port 1 View    Narrative    XR CHEST PORT 1 VW  9/1/2017 3:10 PM      HISTORY: cp    COMPARISON: CT chest 5/28/2013    FINDINGS: Single portable AP view of the chest upright. The  cardiomediastinal silhouette and pulmonary vasculature are stable and  within normal limits. Slightly low lung volumes. Streaky linear  opacities in the right base and left mid lung field. There is no focal  airspace consolidation, pleural effusion, or pneumothorax. The  visualized upper abdomen, osseous structures, and soft tissues are  unremarkable.      Impression    IMPRESSION: Low lung volumes and streaky linear opacities most  consistent with atelectasis.    I have personally reviewed the examination and initial interpretation  and I agree with the findings.    JORDAN POST MD              CBC with platelets differential   Result Value Ref Range    WBC 5.2 4.0 - 11.0 10e9/L    RBC Count 4.45 3.8 - 5.2 10e12/L    Hemoglobin 11.0 (L) 11.7 - 15.7 g/dL    Hematocrit 35.9 35.0 - 47.0 %    MCV 81 78 - 100 fl    MCH 24.7 (L) 26.5 - 33.0 pg    MCHC 30.6 (L) 31.5 - 36.5 g/dL    RDW 15.3 (H) 10.0 - 15.0 %    Platelet Count 225 150 - 450 10e9/L    Diff Method Automated Method     % Neutrophils 63.6 %    % Lymphocytes 26.3 %    % Monocytes 8.3 %    % Eosinophils 1.2 %    % Basophils 0.4 %    % Immature Granulocytes 0.2 %    Nucleated RBCs 0 0 /100    Absolute Neutrophil 3.3 1.6 - 8.3 10e9/L    Absolute Lymphocytes 1.4 0.8 - 5.3 10e9/L    Absolute Monocytes 0.4 0.0 - 1.3 10e9/L    Absolute Eosinophils 0.1 0.0 - 0.7 10e9/L    Absolute Basophils 0.0 0.0 - 0.2 10e9/L    Abs Immature  Granulocytes 0.0 0 - 0.4 10e9/L    Absolute Nucleated RBC 0.0    INR   Result Value Ref Range    INR 1.00 0.86 - 1.14   Partial thromboplastin time   Result Value Ref Range    PTT 26 22 - 37 sec   D dimer quantitative   Result Value Ref Range    D Dimer 0.5 0.0 - 0.50 ug/ml FEU   Comprehensive metabolic panel   Result Value Ref Range    Sodium 143 133 - 144 mmol/L    Potassium 3.6 3.4 - 5.3 mmol/L    Chloride 108 94 - 109 mmol/L    Carbon Dioxide 29 20 - 32 mmol/L    Anion Gap 7 3 - 14 mmol/L    Glucose 117 (H) 70 - 99 mg/dL    Urea Nitrogen 12 7 - 30 mg/dL    Creatinine 0.82 0.52 - 1.04 mg/dL    GFR Estimate 72 >60 mL/min/1.7m2    GFR Estimate If Black 87 >60 mL/min/1.7m2    Calcium 8.6 8.5 - 10.1 mg/dL    Bilirubin Total 0.4 0.2 - 1.3 mg/dL    Albumin 3.5 3.4 - 5.0 g/dL    Protein Total 7.6 6.8 - 8.8 g/dL    Alkaline Phosphatase 74 40 - 150 U/L    ALT 26 0 - 50 U/L    AST 23 0 - 45 U/L   Troponin I   Result Value Ref Range    Troponin I ES <0.015 0.000 - 0.045 ug/L   Nt probnp inpatient (BNP)   Result Value Ref Range    N-Terminal Pro BNP Inpatient 59 0 - 900 pg/mL   EKG 12 lead   Result Value Ref Range    Interpretation ECG Click View Image link to view waveform and result      *Note: Due to a large number of results and/or encounters for the requested time period, some results have not been displayed. A complete set of results can be found in Results Review.

## 2017-09-03 ASSESSMENT — ENCOUNTER SYMPTOMS
JOINT SWELLING: 0
ACTIVITY CHANGE: 1
BACK PAIN: 1
VOICE CHANGE: 0
DYSPHORIC MOOD: 1
MYALGIAS: 1
APPETITE CHANGE: 0
DECREASED CONCENTRATION: 1
SLEEP DISTURBANCE: 1
WEAKNESS: 0
NECK PAIN: 0
TROUBLE SWALLOWING: 0
NUMBNESS: 0

## 2017-09-04 ENCOUNTER — NURSE TRIAGE (OUTPATIENT)
Dept: NURSING | Facility: CLINIC | Age: 57
End: 2017-09-04

## 2017-09-04 NOTE — TELEPHONE ENCOUNTER
Calling to schedule an MRI.  Transferred to schedulers.  Paulette Pierce RN BSN  [facility]

## 2017-09-05 ENCOUNTER — TELEPHONE (OUTPATIENT)
Dept: PALLIATIVE CARE | Facility: CLINIC | Age: 57
End: 2017-09-05

## 2017-09-05 ENCOUNTER — TELEPHONE (OUTPATIENT)
Dept: PHARMACY | Facility: CLINIC | Age: 57
End: 2017-09-05

## 2017-09-05 ENCOUNTER — CARE COORDINATION (OUTPATIENT)
Dept: ONCOLOGY | Facility: CLINIC | Age: 57
End: 2017-09-05

## 2017-09-05 DIAGNOSIS — C50.911 MALIGNANT NEOPLASM OF RIGHT BREAST (H): Primary | ICD-10-CM

## 2017-09-05 DIAGNOSIS — E83.51 HYPOCALCEMIA: ICD-10-CM

## 2017-09-05 NOTE — TELEPHONE ENCOUNTER
hydrochlorothiazide (MICROZIDE) 12.5 MG capsule      Last Written Prescription Date:  9/7/2016  Last Fill Quantity: 90,   # refills: 3  Last Office Visit : 5/2/2017  Future Office visit:  11/13/2017    Routing refill request to provider for review/approval because:  Provider request.  Pended Rx for 90 days + 2

## 2017-09-05 NOTE — PROGRESS NOTES
Spoke to patient post ED visit after discussing with Dr Crawley with her recommendations to do a PET CT whole body and will see patient this week after imaging completed. PET CT scheduled September 6, 17 at 2:15 PM. Will discuss follow-up appointment with Dr Crawley and contact patient with the appointment details and have given PET CT scheduling with patient. Seema Chadwick RN, BSN.    Spoke to patient with appointment and patient is able to make the appointment for lab work at 10:00 and see Dr Crawley at 10:30 to discuss PET CT whole body results. Message sent to scheduling to arrange appointments.  Answered all patient's questions and verbalized understanding. Seema Chadwick RN, BSN.

## 2017-09-05 NOTE — TELEPHONE ENCOUNTER
A prior authorization is needed for the following medication prescribed.  Please complete a prior authorization with the information included below.    Medication:FV-Ketamine 6%, Lidocaine 10% ketoprofen 10 % in PLO  Ndc's: [address]-45 (ketamine), [address]-45(lidocaine), [address]-87 (ketoprofen), [address]-84 (salt stable lo)  RX #:[id number]  Reason for Rejection:prior authorization required     Pharmacy Insurance plan: PAID/[employer]  BIN #:[id number]  ID #:[id number]  Phone #:[phone]      Pharmacy NPI:[id number]      Please advise the pharmacy when the prior authorization is approved or denied.     Thank you for your time.    [facility]  [phone]

## 2017-09-05 NOTE — TELEPHONE ENCOUNTER
"Received VM from patient to follow up from ED visit Friday. She reports she was there for a total of 9 hours and had a positive CT scan. She states that she was told she needed to follow up with Dr. Crawley and get a PET scan within 3 days - she tells me she tried to schedule this over the weekend, but schedulers advised her they could not schedule over the weekend. She asks if she can get in today \"the sooner the better\" as there are enough nurses at work to cover her.     She also reports that the ED MD sent her home with PRN pain medication - oxycodone 5-10mg Q6H PRN. She tells me that this is the best pain control she has had in years. She reports using 10mg every 6 hours in addition to the morphine. She notes that since last Tuesday she has been using 30mg MS Contin BID (instead of 30mg AM and 15mg PM) and will be out early.     Advised I would update Dr. Crawley and Dr. Benitez and work on getting her scheduled with Dr. Crawley' team. She repeats again \"the sooner the better.\"     "

## 2017-09-06 ENCOUNTER — HOSPITAL ENCOUNTER (OUTPATIENT)
Dept: PET IMAGING | Facility: CLINIC | Age: 57
Discharge: HOME | End: 2017-09-06
Attending: INTERNAL MEDICINE | Admitting: INTERNAL MEDICINE
Payer: COMMERCIAL

## 2017-09-06 ENCOUNTER — CARE COORDINATION (OUTPATIENT)
Dept: ONCOLOGY | Facility: CLINIC | Age: 57
End: 2017-09-06

## 2017-09-06 ENCOUNTER — TELEPHONE (OUTPATIENT)
Dept: PALLIATIVE CARE | Facility: CLINIC | Age: 57
End: 2017-09-06

## 2017-09-06 DIAGNOSIS — C50.911 MALIGNANT NEOPLASM OF RIGHT BREAST (H): ICD-10-CM

## 2017-09-06 DIAGNOSIS — G89.29 CHRONIC PAIN: Primary | ICD-10-CM

## 2017-09-06 LAB — GLUCOSE BLDC GLUCOMTR-MCNC: 108 MG/DL (ref 70–99)

## 2017-09-06 PROCEDURE — 34300033 ZZH RX 343: Performed by: INTERNAL MEDICINE

## 2017-09-06 PROCEDURE — 25000128 H RX IP 250 OP 636: Performed by: INTERNAL MEDICINE

## 2017-09-06 PROCEDURE — 82962 GLUCOSE BLOOD TEST: CPT

## 2017-09-06 PROCEDURE — 74177 CT ABD & PELVIS W/CONTRAST: CPT

## 2017-09-06 PROCEDURE — A9552 F18 FDG: HCPCS | Performed by: INTERNAL MEDICINE

## 2017-09-06 PROCEDURE — 71260 CT THORAX DX C+: CPT

## 2017-09-06 RX ORDER — IOPAMIDOL 755 MG/ML
45-135 INJECTION, SOLUTION INTRAVASCULAR ONCE
Status: COMPLETED | OUTPATIENT
Start: 2017-09-06 | End: 2017-09-06

## 2017-09-06 RX ORDER — OXYCODONE HYDROCHLORIDE 10 MG/1
5-10 TABLET ORAL EVERY 6 HOURS PRN
Qty: 12 TABLET | Refills: 0 | Status: SHIPPED | OUTPATIENT
Start: 2017-09-06 | End: 2017-09-08

## 2017-09-06 RX ORDER — HYDROCHLOROTHIAZIDE 12.5 MG/1
12.5 CAPSULE ORAL DAILY
Qty: 90 CAPSULE | Refills: 2 | Status: ON HOLD | OUTPATIENT
Start: 2017-09-06 | End: 2018-01-22

## 2017-09-06 RX ADMIN — FLUDEOXYGLUCOSE F-18 12.55 MCI.: 500 INJECTION, SOLUTION INTRAVENOUS at 14:00

## 2017-09-06 RX ADMIN — IOPAMIDOL 120 ML: 755 INJECTION, SOLUTION INTRAVENOUS at 15:12

## 2017-09-06 NOTE — TELEPHONE ENCOUNTER
Spoke with RNCC Seema Chadwick who advised patient asked for increase valium prior to scan today. Spoke with Dr. Benitez who advised okay to take 10-15mg valium prior to scan, no driving for 5 hours after this dosage and patient can use her current supply.     Called patient and advised of this. She verbalized understanding and confirmed no driving for 5 hours following higher valium dose before the scan. Patient was scheduled for RTN appt with Dr. Benitez for this Friday, 9/8/2017 at 4PM. Small supply of oxycodone 10mg Q6H PRN script (3 days) approved and dropped at [facility] for patient  today when here for scan. Patient informed of all of this and in agreement with the plan.    Last refill oxycodone: 9/1/2017 (#30)  Last office visit: 7/12/2017  Scheduled for follow up 9/8/2047    [place]  Report reviewed.

## 2017-09-06 NOTE — PROGRESS NOTES
Patient called requesting to take 10 to 15 mg of Diazepam prior to PET CT for anxiety with imaging. Current dose of Diazepam from [facility] is 5 mg TID and would use the current prescription, but would run out earlier with the increased dose patient is requesting. Instructed patient to contact Radha nurse with Palliative Care to discuss Diazepam dose prior to imaging. Patient verbalized would call and discuss with that department. Seema Chadwick RN, BSN

## 2017-09-07 ENCOUNTER — ONCOLOGY VISIT (OUTPATIENT)
Dept: ONCOLOGY | Facility: CLINIC | Age: 57
End: 2017-09-07
Attending: INTERNAL MEDICINE
Payer: COMMERCIAL

## 2017-09-07 ENCOUNTER — APPOINTMENT (OUTPATIENT)
Dept: LAB | Facility: CLINIC | Age: 57
End: 2017-09-07
Attending: INTERNAL MEDICINE
Payer: COMMERCIAL

## 2017-09-07 VITALS
RESPIRATION RATE: 16 BRPM | DIASTOLIC BLOOD PRESSURE: 82 MMHG | HEIGHT: 65 IN | TEMPERATURE: 98.8 F | BODY MASS INDEX: 32.65 KG/M2 | WEIGHT: 196 LBS | OXYGEN SATURATION: 94 % | SYSTOLIC BLOOD PRESSURE: 148 MMHG | HEART RATE: 94 BPM

## 2017-09-07 DIAGNOSIS — E89.2 POSTSURGICAL HYPOPARATHYROIDISM (H): ICD-10-CM

## 2017-09-07 DIAGNOSIS — C50.911 MALIGNANT NEOPLASM OF RIGHT BREAST (H): ICD-10-CM

## 2017-09-07 DIAGNOSIS — C73 PAPILLARY CARCINOMA, FOLLICULAR VARIANT (H): ICD-10-CM

## 2017-09-07 DIAGNOSIS — C73 THYROID CANCER (H): ICD-10-CM

## 2017-09-07 DIAGNOSIS — E89.0 POSTSURGICAL HYPOTHYROIDISM: ICD-10-CM

## 2017-09-07 DIAGNOSIS — R59.0 HILAR ADENOPATHY: ICD-10-CM

## 2017-09-07 DIAGNOSIS — C79.51 SPINE METASTASIS: Primary | ICD-10-CM

## 2017-09-07 LAB
ALBUMIN SERPL-MCNC: 3.3 G/DL (ref 3.4–5)
ALP SERPL-CCNC: 70 U/L (ref 40–150)
ALT SERPL W P-5'-P-CCNC: 22 U/L (ref 0–50)
ANION GAP SERPL CALCULATED.3IONS-SCNC: 7 MMOL/L (ref 3–14)
AST SERPL W P-5'-P-CCNC: 18 U/L (ref 0–45)
BASOPHILS # BLD AUTO: 0 10E9/L (ref 0–0.2)
BASOPHILS NFR BLD AUTO: 0.5 %
BILIRUB SERPL-MCNC: 0.5 MG/DL (ref 0.2–1.3)
BUN SERPL-MCNC: 13 MG/DL (ref 7–30)
CALCIUM SERPL-MCNC: 8.6 MG/DL (ref 8.5–10.1)
CHLORIDE SERPL-SCNC: 104 MMOL/L (ref 94–109)
CO2 SERPL-SCNC: 28 MMOL/L (ref 20–32)
CREAT SERPL-MCNC: 0.85 MG/DL (ref 0.52–1.04)
DIFFERENTIAL METHOD BLD: ABNORMAL
EOSINOPHIL # BLD AUTO: 0.1 10E9/L (ref 0–0.7)
EOSINOPHIL NFR BLD AUTO: 2.2 %
ERYTHROCYTE [DISTWIDTH] IN BLOOD BY AUTOMATED COUNT: 15.2 % (ref 10–15)
FERRITIN SERPL-MCNC: 7 NG/ML (ref 8–252)
GFR SERPL CREATININE-BSD FRML MDRD: 69 ML/MIN/1.7M2
GLUCOSE SERPL-MCNC: 111 MG/DL (ref 70–99)
HCT VFR BLD AUTO: 34 % (ref 35–47)
HGB BLD-MCNC: 10.5 G/DL (ref 11.7–15.7)
IMM GRANULOCYTES # BLD: 0 10E9/L (ref 0–0.4)
IMM GRANULOCYTES NFR BLD: 0.3 %
IRON SATN MFR SERPL: 4 % (ref 15–46)
IRON SERPL-MCNC: 19 UG/DL (ref 35–180)
LYMPHOCYTES # BLD AUTO: 1.3 10E9/L (ref 0.8–5.3)
LYMPHOCYTES NFR BLD AUTO: 34.5 %
MCH RBC QN AUTO: 24.5 PG (ref 26.5–33)
MCHC RBC AUTO-ENTMCNC: 30.9 G/DL (ref 31.5–36.5)
MCV RBC AUTO: 79 FL (ref 78–100)
MONOCYTES # BLD AUTO: 0.4 10E9/L (ref 0–1.3)
MONOCYTES NFR BLD AUTO: 10.5 %
NEUTROPHILS # BLD AUTO: 1.9 10E9/L (ref 1.6–8.3)
NEUTROPHILS NFR BLD AUTO: 52 %
NRBC # BLD AUTO: 0 10*3/UL
NRBC BLD AUTO-RTO: 0 /100
PLATELET # BLD AUTO: 258 10E9/L (ref 150–450)
POTASSIUM SERPL-SCNC: 3.9 MMOL/L (ref 3.4–5.3)
PROT SERPL-MCNC: 7.3 G/DL (ref 6.8–8.8)
RBC # BLD AUTO: 4.28 10E12/L (ref 3.8–5.2)
SODIUM SERPL-SCNC: 139 MMOL/L (ref 133–144)
TIBC SERPL-MCNC: 506 UG/DL (ref 240–430)
WBC # BLD AUTO: 3.7 10E9/L (ref 4–11)

## 2017-09-07 PROCEDURE — 83540 ASSAY OF IRON: CPT | Performed by: INTERNAL MEDICINE

## 2017-09-07 PROCEDURE — 99213 OFFICE O/P EST LOW 20 MIN: CPT | Mod: ZF

## 2017-09-07 PROCEDURE — 36415 COLL VENOUS BLD VENIPUNCTURE: CPT

## 2017-09-07 PROCEDURE — 80053 COMPREHEN METABOLIC PANEL: CPT | Performed by: INTERNAL MEDICINE

## 2017-09-07 PROCEDURE — 83550 IRON BINDING TEST: CPT | Performed by: INTERNAL MEDICINE

## 2017-09-07 PROCEDURE — 99214 OFFICE O/P EST MOD 30 MIN: CPT | Mod: ZP | Performed by: INTERNAL MEDICINE

## 2017-09-07 PROCEDURE — 82728 ASSAY OF FERRITIN: CPT | Performed by: INTERNAL MEDICINE

## 2017-09-07 PROCEDURE — 85025 COMPLETE CBC W/AUTO DIFF WBC: CPT | Performed by: INTERNAL MEDICINE

## 2017-09-07 RX ORDER — DEXAMETHASONE 4 MG/1
4 TABLET ORAL 2 TIMES DAILY WITH MEALS
Qty: 10 TABLET | Refills: 0 | Status: SHIPPED | OUTPATIENT
Start: 2017-09-07 | End: 2017-09-12

## 2017-09-07 RX ORDER — LEVOTHYROXINE SODIUM 112 UG/1
TABLET ORAL
Qty: 189 TABLET | Refills: 3 | Status: SHIPPED | OUTPATIENT
Start: 2017-09-07 | End: 2018-08-05

## 2017-09-07 ASSESSMENT — PAIN SCALES - GENERAL: PAINLEVEL: EXTREME PAIN (9)

## 2017-09-07 NOTE — PROGRESS NOTES
September 7, 2017    REASON FOR VISIT:  Add on today follow up from ER     HISTORY OF PRESENT ILLNESS:  Tisha Arias is a 56-year-old woman, postmenopausal, with a history of T1c N0 M0, infiltrating ductal carcinoma of the right breast with a strong family history of breast cancer. BRCA1 and 2 gene testing negative.      Cancer Treatment History for her breast cancer:  -She was diagnosed with breast cancer in 12/2011.    -She underwent bilateral mastectomy with immediate reconstruction on 01/30/2012 by Dr. Daugherty.  Her final pathology revealed 1.6-cm, infiltrating ductal carcinoma, grade 1/3, no angiolymphatic space invasion, no nipple or skin invasion.  There was associated DCIS, and the margins were free of tumor superiorly, anterior margin by 0.1 cm, and widely free at remaining margins.  This was ER/[place]-positive, HER2-negative in the vast majority of cells and non-amplified with a ratio of 1.1.  She had an Oncotype test performed, which revealed a low-risk score of 11.  That put her overall risk of recurrence at about 7% after 5 years of tamoxifen.    - in 02/2012, she was started on Arimidex.    - 12/2012, she had prophylactic hysterectomy and oophorectomy, the pathology of which was benign.    - Her course has been complicated by extreme chest wall pain, myofascial pain, and neuropathic pain.  She has gone through several different clinics and was put on several different medications, which were not doing her any good.  Eventually she was weaned off all the medications and is currently doing only PT with myofascial pain maneuvers with symptomatic improvement.   - 2/2014 switched from arimidex to tamoxifen    Thyroid cancer history: Incidentally, given that she was having so many symptoms, she underwent a PET scan in 4/2013, which revealed a thyroid nodule which was biopsied and was consistent with papillary carcinoma.  She has undergone resection as well as radioiodine treatment since and has done  "relatively well from the surgery.  She follows up with Dr. Castro for the same.  She unfortunately required etoh ablation therapy over the thyroid lymph nodes.   She has not had all of the nodes treated; she remains under the care of Dr Avelina Castro.       INTERVAL HISTORY:  Elvin was last seen in July with no evidence of recurrence.  She called into triage this week with chest pain and was advised to go to the ER.  She had a CT chest performed revealing no PE.  It however, revealed a large paraspinal mass in the chest adjacent to the T10 vertebral body.  It also revealed hilar adenopathy.  She returns today to discuss next steps.      She is extremely overwhelmed with concern of a new mass.      She has chronic chest wall pain.  This recently has been more severe.  She was given oxycodone in the ER and felt like this significantly helped her pain.    She has no hot flashes, no vaginal discharge, or vaginal bleeding.  She is not currently sexually active.  No shortness of breath.  She is eating and drinking well.        Her 10-point review of systems is otherwise negative.        PAST MEDICAL HISTORY: unchanged other than what is written in the HPI.    MEDICATIONS:  reviewed     PHYSICAL EXAMINATION:    VITAL SIGNS:/82 (BP Location: Left arm, Patient Position: Sitting, Cuff Size: Adult Regular)  Pulse 94  Temp 98.8  F (37.1  C) (Oral)  Resp 16  Ht 1.651 m (5' 5\")  Wt 88.9 kg (196 lb)  SpO2 94%  BMI 32.62 kg/m2  Vitals in chart and reviewed  GENERAL:  She is well-appearing, in no apparent distress.    HEENT:  Exam reveals no icterus or pallor.  Oropharynx is clear with no ulcers or lesions.    NECK:  Supple.  No supraclavicular or cervical lymphadenopathy.  She has a scar from thyroid surgery.     HEART:  Regular rate and rhythm.  S1, S2 clear.  No murmur, gallop or rub.    LUNGS:  Clear to auscultation bilaterally.    ABDOMEN:  Soft, nontender, nondistended, no organomegaly.    BREAST EXAM:  Not " performed today.  NO supraclavicular or axillary adenopathy appreciated.  MUSC: tenderness in midspine with associated paraspinal tenderness, no other reproducible tenderness  EXTREMITIES:  She has no lower extremity edema. , ambulating without difficulty, normal 5/5 strength distant LE.  1+ DTRs in knees bilaterally   SKIN: no rash.     LABORATORY:    Lab Results   Component Value Date    WBC 5.2 09/01/2017     Lab Results   Component Value Date    RBC 4.45 09/01/2017     Lab Results   Component Value Date    HGB 11.0 09/01/2017     Lab Results   Component Value Date    HCT 35.9 09/01/2017     No components found for: MCT  Lab Results   Component Value Date    MCV 81 09/01/2017     Lab Results   Component Value Date    MCH 24.7 09/01/2017     Lab Results   Component Value Date    MCHC 30.6 09/01/2017     Lab Results   Component Value Date    RDW 15.3 09/01/2017     Lab Results   Component Value Date     09/01/2017       Recent Labs   Lab Test  09/01/17   1623  04/26/17   1714   05/02/16   1557   02/04/14   1645   NA  143   --    --    --    --   142   POTASSIUM  3.6   --    --   3.8   < >  4.1   CHLORIDE  108   --    --    --    --   101   CO2  29   --    --    --    --   29   ANIONGAP  7   --    --    --    --   12   GLC  117*   --    --    --    --   101*   BUN  12   --    --    --    --   21   CR  0.82  0.83   --   1.02   < >  0.89   ELMO  8.6  8.3*   < >  8.0*   < >  9.0    < > = values in this interval not displayed.     Liver Function Studies -   Recent Labs   Lab Test  09/01/17   1623   PROTTOTAL  7.6   ALBUMIN  3.5   BILITOTAL  0.4   ALKPHOS  74   AST  23   ALT  26        ASSESSMENT AND PLAN:  The patient is a 56-year-old female with a history of stage I, ER/[place]-positive, HER2-negative breast cancer dx in 2011. Oncotype recurrence score of 11. S/p bilateral mastectomies and BSO. On endocrine therapy with tamoxifen (anastrazole discontinued b/c of arthralgias). She now was found to have hilar adenopathy  and T10 paraspinal lesion.    1. Breast cancer - She was on Arimidex from Feb 2012 to Feb 2014, then switched to tamoxifen for ongoing arthralgias.        Today, we reviewed her scans.  I am concerned she has new metastatic disease.  With her cancer history of both breast cancer and thyroid cancer, she needs to undergo biopsy.  Will plan for IR biopsy of paraspinal mass.  I would also like her to have the hilar adenopathy biopsied given her h/o of two cancers.    I will see her back with these results.      2. Papillary thyroid cancer, it is being followed by Dr. Avelina Castro.  She underwent etoh ablation x3 so far.        3.  Synchronous thyroid cancer and breast cancer.  She is brca 1 and 2 negative.  Additional testing is negative.       4. Pain - likely exacerbated by compression of T10 nerve root compression.  She is using oxycodone prn in addition to her usual regimen.  She is scheduled to see our palliative specialists tomorrow.  She has enough oxycodone to get to this appt.    I did ask her to do a trial of decadron 8 mg daily x 5 days and see if this helps with pain.    5. Anemia - ? Inflammation.  Will add on iron studies to today's blood work to determine the etiology.      Supportive cares provided.       Shahla Crawley MD

## 2017-09-07 NOTE — NURSING NOTE
Chief Complaint   Patient presents with     Blood Draw     venipuncture left hand, vitals taken      Nicci Grover CMA

## 2017-09-07 NOTE — LETTER
9/7/2017       RE: Tisha Arias  [address] [place] [address]     Dear Colleague,    Thank you for referring your patient, Tisha Arias, to the [facility]. Please see a copy of my visit note below.    September 7, 2017    REASON FOR VISIT:  Add on today follow up from ER     HISTORY OF PRESENT ILLNESS:  Tisha Arias is a 56-year-old woman, postmenopausal, with a history of T1c N0 M0, infiltrating ductal carcinoma of the right breast with a strong family history of breast cancer. BRCA1 and 2 gene testing negative.      Cancer Treatment History for her breast cancer:  -She was diagnosed with breast cancer in 12/2011.    -She underwent bilateral mastectomy with immediate reconstruction on 01/30/2012 by Dr. Daugherty.  Her final pathology revealed 1.6-cm, infiltrating ductal carcinoma, grade 1/3, no angiolymphatic space invasion, no nipple or skin invasion.  There was associated DCIS, and the margins were free of tumor superiorly, anterior margin by 0.1 cm, and widely free at remaining margins.  This was ER/[place]-positive, HER2-negative in the vast majority of cells and non-amplified with a ratio of 1.1.  She had an Oncotype test performed, which revealed a low-risk score of 11.  That put her overall risk of recurrence at about 7% after 5 years of tamoxifen.    - in 02/2012, she was started on Arimidex.    - 12/2012, she had prophylactic hysterectomy and oophorectomy, the pathology of which was benign.    - Her course has been complicated by extreme chest wall pain, myofascial pain, and neuropathic pain.  She has gone through several different clinics and was put on several different medications, which were not doing her any good.  Eventually she was weaned off all the medications and is currently doing only PT with myofascial pain maneuvers with symptomatic improvement.   - 2/2014 switched from arimidex to tamoxifen    Thyroid cancer history: Incidentally, given that  "she was having so many symptoms, she underwent a PET scan in 4/2013, which revealed a thyroid nodule which was biopsied and was consistent with papillary carcinoma.  She has undergone resection as well as radioiodine treatment since and has done relatively well from the surgery.  She follows up with Dr. Castro for the same.  She unfortunately required etoh ablation therapy over the thyroid lymph nodes.   She has not had all of the nodes treated; she remains under the care of Dr Avelina Castro.       INTERVAL HISTORY:  Elvin was last seen in July with no evidence of recurrence.  She called into triage this week with chest pain and was advised to go to the ER.  She had a CT chest performed revealing no PE.  It however, revealed a large paraspinal mass in the chest adjacent to the T10 vertebral body.  It also revealed hilar adenopathy.  She returns today to discuss next steps.      She is extremely overwhelmed with concern of a new mass.      She has chronic chest wall pain.  This recently has been more severe.  She was given oxycodone in the ER and felt like this significantly helped her pain.    She has no hot flashes, no vaginal discharge, or vaginal bleeding.  She is not currently sexually active.  No shortness of breath.  She is eating and drinking well.        Her 10-point review of systems is otherwise negative.        PAST MEDICAL HISTORY: unchanged other than what is written in the HPI.    MEDICATIONS:  reviewed     PHYSICAL EXAMINATION:    VITAL SIGNS:/82 (BP Location: Left arm, Patient Position: Sitting, Cuff Size: Adult Regular)  Pulse 94  Temp 98.8  F (37.1  C) (Oral)  Resp 16  Ht 1.651 m (5' 5\")  Wt 88.9 kg (196 lb)  SpO2 94%  BMI 32.62 kg/m2  Vitals in chart and reviewed  GENERAL:  She is well-appearing, in no apparent distress.    HEENT:  Exam reveals no icterus or pallor.  Oropharynx is clear with no ulcers or lesions.    NECK:  Supple.  No supraclavicular or cervical lymphadenopathy. "  She has a scar from thyroid surgery.     HEART:  Regular rate and rhythm.  S1, S2 clear.  No murmur, gallop or rub.    LUNGS:  Clear to auscultation bilaterally.    ABDOMEN:  Soft, nontender, nondistended, no organomegaly.    BREAST EXAM:  Not performed today.  NO supraclavicular or axillary adenopathy appreciated.  MUSC: tenderness in midspine with associated paraspinal tenderness, no other reproducible tenderness  EXTREMITIES:  She has no lower extremity edema. , ambulating without difficulty, normal 5/5 strength distant LE.  1+ DTRs in knees bilaterally   SKIN: no rash.     LABORATORY:    Lab Results   Component Value Date    WBC 5.2 09/01/2017     Lab Results   Component Value Date    RBC 4.45 09/01/2017     Lab Results   Component Value Date    HGB 11.0 09/01/2017     Lab Results   Component Value Date    HCT 35.9 09/01/2017     No components found for: MCT  Lab Results   Component Value Date    MCV 81 09/01/2017     Lab Results   Component Value Date    MCH 24.7 09/01/2017     Lab Results   Component Value Date    MCHC 30.6 09/01/2017     Lab Results   Component Value Date    RDW 15.3 09/01/2017     Lab Results   Component Value Date     09/01/2017       Recent Labs   Lab Test  09/01/17   1623  04/26/17   1714   05/02/16   1557   02/04/14   1645   NA  143   --    --    --    --   142   POTASSIUM  3.6   --    --   3.8   < >  4.1   CHLORIDE  108   --    --    --    --   101   CO2  29   --    --    --    --   29   ANIONGAP  7   --    --    --    --   12   GLC  117*   --    --    --    --   101*   BUN  12   --    --    --    --   21   CR  0.82  0.83   --   1.02   < >  0.89   ELMO  8.6  8.3*   < >  8.0*   < >  9.0    < > = values in this interval not displayed.     Liver Function Studies -   Recent Labs   Lab Test  09/01/17   1623   PROTTOTAL  7.6   ALBUMIN  3.5   BILITOTAL  0.4   ALKPHOS  74   AST  23   ALT  26        ASSESSMENT AND PLAN:  The patient is a 56-year-old female with a history of stage I,  ER/[place]-positive, HER2-negative breast cancer dx in 2011. Oncotype recurrence score of 11. S/p bilateral mastectomies and BSO. On endocrine therapy with tamoxifen (anastrazole discontinued b/c of arthralgias). She now was found to have hilar adenopathy and T10 paraspinal lesion.    1. Breast cancer - She was on Arimidex from Feb 2012 to Feb 2014, then switched to tamoxifen for ongoing arthralgias.        Today, we reviewed her scans.  I am concerned she has new metastatic disease.  With her cancer history of both breast cancer and thyroid cancer, she needs to undergo biopsy.  Will plan for IR biopsy of paraspinal mass.  I would also like her to have the hilar adenopathy biopsied given her h/o of two cancers.    I will see her back with these results.      2. Papillary thyroid cancer, it is being followed by Dr. Avelina Castro.  She underwent etoh ablation x3 so far.        3.  Synchronous thyroid cancer and breast cancer.  She is brca 1 and 2 negative.  Additional testing is negative.       4. Pain - likely exacerbated by compression of T10 nerve root compression.  She is using oxycodone prn in addition to her usual regimen.  She is scheduled to see our palliative specialists tomorrow.  She has enough oxycodone to get to this appt.    I did ask her to do a trial of decadron 8 mg daily x 5 days and see if this helps with pain.    5. Anemia - ? Inflammation.  Will add on iron studies to today's blood work to determine the etiology.      Supportive cares provided.       Shahla Crawley MD

## 2017-09-07 NOTE — NURSING NOTE
"Oncology Rooming Note    September 7, 2017 10:32 AM   Tisha Arias is a 56 year old female who presents for:    Chief Complaint   Patient presents with     Blood Draw     venipuncture left hand, vitals taken      Oncology Clinic Visit     Return visit related to Breast Cancer     Initial Vitals: /82 (BP Location: Left arm, Patient Position: Sitting, Cuff Size: Adult Regular)  Pulse 94  Temp 98.8  F (37.1  C) (Oral)  Resp 16  Ht 1.651 m (5' 5\")  Wt 88.9 kg (196 lb)  SpO2 94%  BMI 32.62 kg/m2 Estimated body mass index is 32.62 kg/(m^2) as calculated from the following:    Height as of this encounter: 1.651 m (5' 5\").    Weight as of this encounter: 88.9 kg (196 lb). Body surface area is 2.02 meters squared.  Extreme Pain (9) Comment: Back   No LMP recorded. Patient has had a hysterectomy.  Allergies reviewed: Yes  Medications reviewed: Yes    Medications: Medication refills not needed today.  Pharmacy name entered into [facility]: [id number] - [place], [place] - [address] [address] [place]    Clinical concerns: Patient did not want to go though her outside medication list or allergies as she is anxious about today's visit.. Provider was NOT notified.    10 minutes for nursing intake (face to face time)     Shahla Allison LPN            "

## 2017-09-07 NOTE — TELEPHONE ENCOUNTER
"levothyroxine  Last Written Prescription Date:  5/2/17 \"local print\"   Last Office Visit : 5/2/17  Future Office visit:11/13/17    Routing refill request to provider for review/approval because:  Last refill was not rec'd by pharmacy, directions revised to esprescribe      "

## 2017-09-07 NOTE — MR AVS SNAPSHOT
After Visit Summary   9/7/2017    Tisha Arias    MRN: [id number]           Patient Information     Date Of Birth          1960        Visit Information        Provider Department      9/7/2017 10:30 AM Shahla Crawley MD [facility]        Today's Diagnoses     Spine metastasis (H)    -  1    Malignant neoplasm of right breast (H)        Hilar adenopathy           Follow-ups after your visit        Additional Services     PULMONARY MEDICINE REFERRAL       Hilar lymph node - history of thyroid and breast cancer      Your provider has referred you to: [facility]: Lung Disease and Pulmonary Clinic [facility] [phone]   [email or web address]    Please be aware that coverage of these services is subject to the terms and limitations of your health insurance plan.  Call member services at your health plan with any benefit or coverage questions.      Please bring the following with you to your appointment:    (1) Any X-Rays, CTs or MRIs which have been performed.  Contact the facility where they were done to arrange for  prior to your scheduled appointment.    (2) List of current medications   (3) This referral request   (4) Any documents/labs given to you for this referral                  Your next 10 appointments already scheduled     Sep 08, 2017  4:00 PM CDT   (Arrive by 3:45 PM)   Return Visit with Edu Benitez MD   [facility] ([facility])    [address]  2nd Floor  [facility] [address] [phone]            Sep 11, 2017  9:00 AM CDT   (Arrive by 8:45 AM)   New Patient Visit with DENZEL Zuniga [facility] Interventional Radiology ([facility])    [address]  1st Floor  [facility] [address] [phone]            Sep 13, 2017  8:20 AM CDT   (Arrive by 8:05 AM)   NEW LUNG NODULE with  Brady Dobbs MD   [facility] ([facility])    [address] [place] [address]  2nd [facility] [id number] [phone]            Nov 02, 2017  5:00 PM CDT   US HEAD NECK SOFT TISSUE with UCUS3   [facility] US ([facility])    [address] [place] [id number] [phone]           Please bring a list of your medicines (including vitamins, minerals and over-the-counter drugs). Also, tell your doctor about any allergies you may have. Wear comfortable clothes and leave your valuables at home.  You do not need to do anything special to prepare for your exam.  Please call the Imaging Department at your exam site with any questions.            Nov 02, 2017  6:00 PM CDT   LAB with  LAB   [facility] ([facility])    [address] [place] [id number] [phone]           Patient must bring picture ID. Patient should be prepared to give a urine specimen  Please do not eat 10-12 hours before your appointment if you are coming in fasting for labs on lipids, cholesterol, or glucose (sugar). Pregnant women should follow their Care Team instructions. Water with medications is okay. Do not drink coffee or other fluids. If you have concerns about taking  your medications, please ask at office or if scheduling via TrackIFhart, send a message by clicking on Secure Messaging, Message Your Care Team.            Nov 13, 2017  4:30 PM CST   (Arrive by 4:15 PM)   RETURN ENDOCRINE with Avelina Castro MD   [facility] ([facility])    [address] [place] [address]  3rd [facility] [id number] [phone]            Nov 27, 2017  4:00 PM CST   (Arrive by 3:45 PM)   Return Visit with Shahla Crawley MD   [facility] ([facility])    [address] [place] [address]  "[id number] [phone]              Future tests that were ordered for you today     Open Future Orders        Priority Expected Expires Ordered    CT Lung biopsy Routine  9/7/2018 9/7/2017            Who to contact     If you have questions or need follow up information about today's clinic visit or your schedule please contact [facility] directly at [phone].  Normal or non-critical lab and imaging results will be communicated to you by [employer], letter or phone within 4 business days after the clinic has received the results. If you do not hear from us within 7 days, please contact the clinic through ApplyKitt or phone. If you have a critical or abnormal lab result, we will notify you by phone as soon as possible.  Submit refill requests through [employer] or call your pharmacy and they will forward the refill request to us. Please allow 3 business days for your refill to be completed.          Additional Information About Your Visit        [employer] Information     [employer] gives you secure access to your electronic health record. If you see a primary care provider, you can also send messages to your care team and make appointments. If you have questions, please call your primary care clinic.  If you do not have a primary care provider, please call [phone] and they will assist you.        Care EveryWhere ID     This is your Care EveryWhere ID. This could be used by other organizations to access your [place] medical records  [id number]        Your Vitals Were     Pulse Temperature Respirations Height Pulse Oximetry BMI (Body Mass Index)    94 98.8  F (37.1  C) (Oral) 16 1.651 m (5' 5\") 94% 32.62 kg/m2       Blood Pressure from Last 3 Encounters:   09/07/17 148/82   09/01/17 133/81   07/12/17 130/79    Weight from Last 3 Encounters:   09/07/17 88.9 kg (196 lb)   09/01/17 89 kg (196 lb 3.2 oz)   07/12/17 87.1 kg (192 lb)              We Performed the Following     CBC with platelets " differential     Comprehensive metabolic panel     PULMONARY MEDICINE REFERRAL          Today's Medication Changes          These changes are accurate as of: 9/7/17 11:21 AM.  If you have any questions, ask your nurse or doctor.               Start taking these medicines.        Dose/Directions    dexamethasone 4 MG tablet   Commonly known as:  DECADRON   Used for:  Spine metastasis (H), Malignant neoplasm of right breast (H)   Started by:  Shahla Crawley MD        Dose:  4 mg   Take 1 tablet (4 mg) by mouth 2 times daily (with meals)   Quantity:  10 tablet   Refills:  0         These medicines have changed or have updated prescriptions.        Dose/Directions    levothyroxine 112 MCG tablet   Commonly known as:  SYNTHROID/LEVOTHROID   This may have changed:  additional instructions   Used for:  Postsurgical hypothyroidism, Papillary carcinoma, follicular variant (H), Postsurgical hypoparathyroidism (H), Thyroid cancer (H)   Changed by:  Avelina Castro MD        Mon-Sat: (2 tabs daily) Sunday: 3 tabs   Quantity:  189 tablet   Refills:  3            Where to get your medicines      These medications were sent to [id number] - [place] - [address] [address] [place]  [address] [facility] [address]     Phone:  [phone]     levothyroxine 112 MCG tablet         These medications were sent to [facility] [address] [address]  [address] [address]    Hours:  TRANSPLANT PHONE NUMBER [phone] Phone:  [phone]     dexamethasone 4 MG tablet                Primary Care Provider Office Phone # Fax #    Shahla Crawley [phone]       [address] [place] [address]  [facility] [address]        Equal Access to Services     JENAE ZAMORA : Kevin Venegas, girish ge, ranjan south. So [facility] [phone].    ATENCIÓN: Sandy lucas  español, tiene a stiles disposición servicios gratuitos de asistencia lingüística. Oneil prince [phone].    We comply with applicable federal civil rights laws and [place] laws. We do not discriminate on the basis of race, color, national origin, age, disability sex, sexual orientation or gender identity.            Thank you!     Thank you for choosing [facility]  for your care. Our goal is always to provide you with excellent care. Hearing back from our patients is one way we can continue to improve our services. Please take a few minutes to complete the written survey that you may receive in the mail after your visit with us. Thank you!             Your Updated Medication List - Protect others around you: Learn how to safely use, store and throw away your medicines at www.disposemymeds.org.          This list is accurate as of: 9/7/17 11:21 AM.  Always use your most recent med list.                   Brand Name Dispense Instructions for use Diagnosis    ACAI PO      Take 1,000 mg by mouth 2 times daily    Breast cancer, unspecified laterality, Thyroid cancer (H), Chronic arthralgias of knees and hips, unspecified laterality       aluminum chloride 20 % external solution    DRYSOL    60 mL    Apply topically At Bedtime    Rash, Intertrigo       AZMACORT IN      Inhale 2 puffs into the lungs as needed        BENADRYL PO      Take 50 mg by mouth as needed        BETAINE HCL PO      Take 2 tablets by mouth as needed Betaine HCL and Pepsin: Take 1-7 tabs by mouth daily, if burning take one tablet less once daily. Betaine HCL 1.04 g Pepsin 40 mg        calcitRIOL 0.25 MCG capsule    ROCALTROL    270 capsule    2 tabs in am and 1 tab qhs    Postsurgical hypothyroidism, Papillary carcinoma, follicular variant (H), Metastasis to cervical lymph node (H)       CALCIUM CITRATE +D 315-250 MG-UNIT Tabs per tablet   Generic drug:  calcium citrate-vitamin D     120 tablet    Take 2 tablets by mouth 3 times  daily        celecoxib 200 MG capsule    celeBREX    180 capsule    Take 1 capsule (200 mg) by mouth 2 times daily    Chest wall pain       chlorhexidine 0.12 % solution    PERIDEX          * Colchicine 0.6 MG Caps     270 capsule    Take 0.6 mg by mouth 3 times daily for costochondritis (chest) discomfort. Scale back use to prn with loose stools or bloating.    Costal chondritis       * COLCRYS 0.6 MG tablet   Generic drug:  colchicine      Take 1.6 mg by mouth 3 times daily        COMPOUNDED NON-CONTROLLED SUBSTANCE - PHARMACY TO MIX COMPOUNDED MEDICATION    CMPD RX    120 g    Apply small amount to affected area two times daily.Ketamine 6% + ketoprofen 10% + lidocaine 10% in Lipo.    Chest wall pain       cromolyn 4 % ophthalmic solution    OPTICROM    10 mL    Place 1 drop into both eyes 4 times daily    Idiopathic mast cell activation syndrome (H)       cyclobenzaprine 10 MG tablet    FLEXERIL     Take 10 mg by mouth 3 times daily as needed On hold Dr Monsalve        dexamethasone 4 MG tablet    DECADRON    10 tablet    Take 1 tablet (4 mg) by mouth 2 times daily (with meals)    Spine metastasis (H), Malignant neoplasm of right breast (H)       diazepam 5 MG tablet    VALIUM    90 tablet    Take 1 tablet (5 mg) by mouth 3 times daily as needed For muscle spasms    Chest wall pain       diclofenac 1 % Gel topical gel    VOLTAREN    100 g    Apply affected area two times daily PRN using enclosed dosing card.    Myofascial pain       docusate sodium 100 MG tablet    COLACE    60 tablet    Take 100 mg by mouth daily    Acute constipation       EPINEPHrine 0.3 MG/0.3ML injection 2-pack    EPIPEN 2-CARIDAD    0.6 mL    Inject 0.3 mLs (0.3 mg) into the muscle once as needed for anaphylaxis        hydrochlorothiazide 12.5 MG capsule    MICROZIDE    90 capsule    Take 1 capsule (12.5 mg) by mouth daily    Hypocalcemia       HYDROcodone-acetaminophen 5-325 MG per tablet    NORCO     TK 1 T PO Q 4 TO 6 H PRN P         levothyroxine 112 MCG tablet    SYNTHROID/LEVOTHROID    189 tablet    Mon-Sat: (2 tabs daily) Sunday: 3 tabs    Postsurgical hypothyroidism, Papillary carcinoma, follicular variant (H), Postsurgical hypoparathyroidism (H), Thyroid cancer (H)       lidocaine 5 % ointment    XYLOCAINE    35.44 g    SANJANA TOPICALLY QID PRN    Chest wall pain       Lidocaine HCl Monohydrate Powd           MAGNESIUM CITRATE PO      Take 400 mg by mouth daily        methocarbamol 750 MG tablet    ROBAXIN    360 tablet    Take 1 tablet (750 mg) by mouth 4 times daily as needed . Ok to take a 5th Robaxin on very severe days.    Myofascial pain       mometasone 110 MCG/INH inhaler    ASMANEX 30 METERED DOSES    3 Inhaler    Inhale 1 puff into the lungs daily    Intermittent asthma, uncomplicated       montelukast 10 MG tablet    SINGULAIR    120 tablet    TAKE TWO TABLETS BY MOUTH TWICE DAILY    Idiopathic mast cell activation syndrome (H)       * morphine 30 MG 12 hr tablet    MS CONTIN    60 tablet    1 tab (30mg) in the morning and in the afternoon + 15mg at bedtime    Chest wall pain       * morphine 15 MG 12 hr tablet    MS CONTIN    30 tablet    1 tab (15mg) at bedtime.    Chest wall pain       NASALCROM 5.2 MG/ACT Aers Inhaler   Generic drug:  cromolyn sodium     1 Bottle    SPRAY ONE SPRAY( 1 ML) IN NOSTRIL DAILY    Mast cell disease, systemic (H)       OMEGA 3 PO      Take 5 mLs by mouth        ondansetron 4 MG ODT tab    ZOFRAN ODT    60 tablet    Take 1-2 tablets (4-8 mg) by mouth every 8 hours as needed for nausea or vomiting    Nausea with vomiting       * order for DME     2 Units    Equipment being ordered: compression stockings. 20-30 mm or 30 - 40 mm as patient can tolerate. Physical therapy to determine if they should be above or below the knee.    Venous stasis       * order for DME     2 Device    Equipment being ordered: compression bra    Malignant neoplasm of right female breast, unspecified site of breast        oxyCODONE 10 MG IR tablet    ROXICODONE    12 tablet    Take 0.5-1 tablets (5-10 mg) by mouth every 6 hours as needed for pain    Chronic pain       polyethylene glycol powder    MIRALAX    510 g    Take 17 g by mouth daily    Acute constipation       potassium chloride SA 20 MEQ CR tablet    potassium chloride    90 tablet    Take 1 tablet (20 mEq) by mouth daily    Hypokalemia       PROBIOTIC DAILY PO      Take 1 capsule by mouth daily Lacto acid bifidobacterium    Breast cancer, unspecified laterality, Thyroid cancer (H), Chronic arthralgias of knees and hips, unspecified laterality       ranitidine 75 MG tablet    ZANTAC    270 tablet    Take 1 tablet (75 mg) by mouth 3 times daily    Mast cell disease, systemic (H)       SUMAtriptan 50 MG tablet    IMITREX    5 tablet    Take 1 tablet (50 mg) by mouth at onset of headache for migraine (may repeat in 2 hours as needed, max 2 tablets daily)    Migraine without status migrainosus, not intractable, unspecified migraine type       tacrolimus 0.1 % ointment    PROTOPIC    60 g    Apply topically 2 times daily    Rash, Intertrigo       tamoxifen 20 MG tablet    NOLVADEX    90 tablet    Take 1 tablet (20 mg) by mouth daily    Malignant neoplasm of female breast, unspecified laterality, unspecified site of breast       triamcinolone 0.025 % ointment    KENALOG    80 g    Apply topically 2 times daily Mix with 1 lb jar of vaseline or aquaphor    Intertrigo, Rash       TUMS 500 MG chewable tablet   Generic drug:  calcium carbonate     180 chew tab    Take 2 tablets (1,000 mg) by mouth nightly as needed for other (cramps)        * UNABLE TO FIND      1 tablet 3 times daily MEDICATION NAME: calcium D-Glucarate        * UNABLE TO FIND      2 tablets 3 times daily MEDICATION NAME: Natural D-Hist    Breast cancer, unspecified laterality, Papillary carcinoma, follicular variant (H), Chronic musculoskeletal pain       * UNABLE TO FIND      MEDICATION NAME: Tumeric 3 capsules  daily        * UNABLE TO FIND      1 tablet 3 times daily MEDICATION NAME: Digestzymes    Thyroid cancer (H), Postsurgical hypothyroidism, Postsurgical hypoparathyroidism (H)       * UNABLE TO FIND      1 tablet daily MEDICATION NAME: Pure Encapsulations    Thyroid cancer (H), Postsurgical hypothyroidism, Postsurgical hypoparathyroidism (H)       VENTOLIN  (90 BASE) MCG/ACT Inhaler   Generic drug:  albuterol     18 g    INHALE 2 PUFFS INTO THE LUNGS EVERY 4 HOURS AS NEEDED FOR SHORTNESS OF BREATH OR DIFFICULT BREATHING OR WHEEZING    Mild intermittent asthma without complication       vitamin D3 2000 UNITS Caps     60 capsule    Take 10,000 Units by mouth daily    Thyroid cancer (H), Postsurgical hypothyroidism, Papillary carcinoma, follicular variant (H), Postsurgical hypoparathyroidism (H)       ZYRTEC ALLERGY 10 MG tablet   Generic drug:  cetirizine     90 tablet    Take 1 tablet (10 mg) by mouth 3 times daily On hold for lab test.        * Notice:  This list has 11 medication(s) that are the same as other medications prescribed for you. Read the directions carefully, and ask your doctor or other care provider to review them with you.

## 2017-09-08 ENCOUNTER — TELEPHONE (OUTPATIENT)
Dept: ONCOLOGY | Facility: CLINIC | Age: 57
End: 2017-09-08

## 2017-09-08 ENCOUNTER — OFFICE VISIT (OUTPATIENT)
Dept: PALLIATIVE CARE | Facility: CLINIC | Age: 57
End: 2017-09-08
Attending: INTERNAL MEDICINE
Payer: COMMERCIAL

## 2017-09-08 VITALS
RESPIRATION RATE: 16 BRPM | HEIGHT: 65 IN | WEIGHT: 196 LBS | SYSTOLIC BLOOD PRESSURE: 148 MMHG | TEMPERATURE: 98.2 F | DIASTOLIC BLOOD PRESSURE: 80 MMHG | HEART RATE: 88 BPM | BODY MASS INDEX: 32.65 KG/M2 | OXYGEN SATURATION: 97 %

## 2017-09-08 DIAGNOSIS — G89.3 NEOPLASM RELATED PAIN: Primary | ICD-10-CM

## 2017-09-08 DIAGNOSIS — R07.89 CHEST WALL PAIN: ICD-10-CM

## 2017-09-08 DIAGNOSIS — D50.8 OTHER IRON DEFICIENCY ANEMIA: Primary | ICD-10-CM

## 2017-09-08 PROCEDURE — 99212 OFFICE O/P EST SF 10 MIN: CPT

## 2017-09-08 PROCEDURE — 99214 OFFICE O/P EST MOD 30 MIN: CPT | Mod: ZP | Performed by: INTERNAL MEDICINE

## 2017-09-08 RX ORDER — MORPHINE SULFATE 30 MG/1
30 TABLET, FILM COATED, EXTENDED RELEASE ORAL EVERY 8 HOURS
Qty: 90 TABLET | Refills: 0 | Status: SHIPPED | OUTPATIENT
Start: 2017-09-13 | End: 2017-09-27

## 2017-09-08 RX ORDER — DIAZEPAM 5 MG
5 TABLET ORAL 3 TIMES DAILY PRN
Qty: 90 TABLET | Refills: 2 | Status: ON HOLD | OUTPATIENT
Start: 2017-09-08 | End: 2018-01-01

## 2017-09-08 RX ORDER — MORPHINE SULFATE 30 MG/1
30 TABLET, FILM COATED, EXTENDED RELEASE ORAL EVERY 8 HOURS
Qty: 90 TABLET | Refills: 0 | Status: SHIPPED | OUTPATIENT
Start: 2017-09-08 | End: 2017-09-08

## 2017-09-08 RX ORDER — OXYCODONE HYDROCHLORIDE 10 MG/1
15-20 TABLET ORAL EVERY 4 HOURS PRN
Qty: 180 TABLET | Refills: 0 | Status: SHIPPED | OUTPATIENT
Start: 2017-09-08 | End: 2017-09-27

## 2017-09-08 RX ORDER — MORPHINE SULFATE 30 MG/1
30 TABLET, FILM COATED, EXTENDED RELEASE ORAL EVERY 12 HOURS
Qty: 90 TABLET | Refills: 0 | Status: SHIPPED | OUTPATIENT
Start: 2017-09-13 | End: 2017-09-08

## 2017-09-08 ASSESSMENT — PAIN SCALES - GENERAL: PAINLEVEL: NO PAIN (0)

## 2017-09-08 NOTE — TELEPHONE ENCOUNTER
Oncology Nutrition:  Reason for Contact:  Patient was contacted by phone due to requesting to speak with a dietitian on the Oncology Distress Screening tool.   Called Elvin today.  She was with a client when she received this call.  She requested RD contact information and will call back when she is free.     Cindy Ziegler RD, LD  Oncology Dietitian  [phone]  Pager: [phone]

## 2017-09-08 NOTE — NURSING NOTE
"Oncology Rooming Note    September 8, 2017 3:53 PM   Tisha Arias is a 52 year old female who presents for: Oncology Clinic Visit    Initial Vitals: Pulse 88  Temp 98.2  F (36.8  C) (Oral)  Resp 16  Ht 1.651 m (5' 5\")  Wt 88.9 kg (196 lb)  SpO2 97%  BMI 32.62 kg/m2 Estimated body mass index is 32.62 kg/(m^2) as calculated from the following:    Height as of this encounter: 1.651 m (5' 5\").    Weight as of this encounter: 88.9 kg (196 lb). Body surface area is 2.02 meters squared.  No Pain (0) Comment: Data Unavailable   No LMP recorded. Patient has had a hysterectomy.  Allergies reviewed: Yes  Medications reviewed: Yes    Medications: MEDICATION REFILLS NEEDED TODAY. Provider was NOT notified.  Pharmacy name entered into Talent[employer]: [id number] - [place], [place] - [address] [address] [place]    Clinical concerns:morphine, valium refills      6 minutes for nursing intake (face to face time)     Nicci Grover CMA                                                      "

## 2017-09-08 NOTE — PROGRESS NOTES
Palliative Staff/Outpatient Clinic    (This note was transcribed using voice recognition software. While I review and edit the transcription, I may miss errors. Please let me know of any serious mis-transcriptions and I will addend this note.)    CC/Patient ID:  Patient ID: chronic complex myofascial chest wall pain after breast cancer surgery and reconstruction   Saw Dr England (at my insistence) earlier in 2016 for a 2nd opinion - he recommended modest opioid increases which we did and it only seemed to improve her situation temporarily  Mood disturbance in this context   Follows closely also with acupuncture, massage, psychotherapy.   Is diagnosed also with MCAS and follows with Dr Avendaño  Thyroid cancer s/p resection, ablations  Sept 2017 - she developed escalating sternal and back pain and had a CT in ED, then PET showing R 10th rib-->10 mass, mediastinal and hilar lymphadenopathy    History:  Ms Archer is seen today with her therapist Darcy.  We reviewed her recent history. Her chest and back pain escalated and changed in character and intensity a couple weeks ago. Eventually she was seen in the emergency room and had a CT than PET scan and has presented presumably metastatic disease in her bone (10th rib) and mediastinal lymph nodes. She saw Dr. Crawley yesterday and biopsies are planned. Understandably she is very upset about this.    The emergency medicine doctor gave her some oxycodone 5 mg tablets. She told us these were doing a great job for her pain. Today she tells me that they have essentially done minimal for her pain. She only had a few doses of the 5 mg tablets I gave her some 10 mg several days ago pending this visit today. She says she is taking 10 mg of oxycodone about every 4-5 hours during the day. Again she reports minimal relief. She denies any side effects including operative changes and feels like her work performance is not impaired by the pain medicine. In fact is impaired by the pain.    The  "pain is substernal as well as she has a sensation of a knife shooting posterior from her xiphoid into her back. She remains on her other medications. The pain is also ripping and pleuritic. Dr. Crawley give her Decadron-she's only had a couple doses so far but it has not helped so far.    SH:   Reviewed and unchanged. 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     ROS:   stooling and urinating okay. No tingling or numbness down the legs. Swallowing okay.     PE: /80  Pulse 88  Temp 98.2  F (36.8  C) (Oral)  Resp 16  Ht 1.651 m (5' 5\")  Wt 88.9 kg (196 lb)  SpO2 97%  BMI 32.62 kg/m2  Alert distraught F   Eyes anicteric no injection  Neck s/p thyroidectomy, no masses  Lungs unlab, ctab  Chest wall - diffusely very tender to light palpation bilat, post, anterior. No clear masses.  CV tachy regular s1s2  No le edema  Affect flat, sad, tearful, congruent.     Impression & Recommendations:  56-year-old woman with chronic chest wall pain after breast cancer surgery, now with metastatic disease-biopsies pending in the etiology of this is not yet confirmed. Regardless she has clear tumor and this is escalated her pain. I recommended increasing the oxycodone to 15 mg q.4 hours for couple days, if that's not enough then increase it to 20 mg q.4 hours. Increase MS Contin to 30 mg t.i.d. Discussed cognitive effects from the opioids including driving safety and work performance. She is always being very careful and full. As in the past and I'm not particularly worried but we did talk about it. Expressed my sorrow that her cancer has returned. She asks me questions about what the surgeries are like for this and I tell her I don't really know the answer to that. Follow-up in a month.    I discussed her situation and symptoms with Dr Crawley this morning in clinic. "     -The above was transcribed using voice recognition software. While I review and edit the transcription, I may miss errors. Please let me know of any serious mis-transcriptions and I will addend this note.    Chart data reviewed today:    Family History   Problem Relation Age of Onset     Allergies Mother      Arthritis Mother      CANCER Mother      uterine/uterine     Hypertension Mother      on HCTZ     Hyperlipidemia Mother      CEREBROVASCULAR DISEASE Mother      s/p brain surgery     Breast Cancer Mother      Depression Mother      Anxiety Disorder Mother      Anesthesia Reaction Mother      Asthma Mother      Skin Cancer Mother      HEART DISEASE Father      DIABETES Father      Coronary Artery Disease Father      Hypertension Father      Hyperlipidemia Father      CEREBROVASCULAR DISEASE Father      Multiple strokes     Obesity Father      DIABETES Brother      Depression Brother      Hypertension Brother      CANCER Brother 57     pancreatic cancer     CANCER Maternal Grandfather      pancreatic cancer/stomach cancer     Prostate Cancer Maternal Grandfather      Prostrate and Bladder     Other Cancer Maternal Grandfather      Pancreatic 1988, Bladder 1977     CANCER Maternal Grandmother      uterine     Breast Cancer Maternal Grandmother      Skin Cancer Maternal Grandmother      DIABETES Brother      Breast Cancer Cousin      Grand mothers sister     Other Cancer Brother      Stage 3 Pancreatic 2-2015     Depression Brother      Asthma Brother      Obesity Brother      Anesthesia Reaction Other      H/a, itching, drug interactions     Asthma Other      Thyroid Disease No family hx of      CANCER Brother      Pancreatic      Past Medical History:   Diagnosis Date     Allergic rhinitis due to animal dander      Asthma      Breast cancer (H) 1/30/12    right     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 No real need for Epipen for this alone.     GDM (gestational diabetes mellitus)     w first pregnancy only     Gestational hypertension      Lymphedema     jackson arms, chest     Migraine      Neuropathy associated with cancer (H)      Papillary carcinoma, follicular variant (H) 2013    pT3, N1, Mx, thyroid     Post-surgical hypothyroidism      Renal disease     kidney stones     Rhinitis, allergic to other allergen      Right Breast mass and microcalcifications 2011     Seasonal allergic conjunctivitis      Seasonal allergic rhinitis 11 skin tests pos. for: dog/M/T/G/W--NEGATIVE FOOD TESTS FOR: shrimp, crab, lobster, coconut     Past Surgical History:   Procedure Laterality Date     BACK SURGERY  ,    Bulging disc w nerve root impigment     BIOPSY BREAST      right     BIOPSY BREAST  11    right, core and sterotactic     BYPASS GASTRIC, CHOLECYSTECTOMY, COMBINED       C LAPAROSCOPIC GASTRIC RESTRICTIVE PX, W/GASTRIC BYPASS/ GAMALIEL-EN-Y, < 150CM      Gamaliel en Y?      SECTION       COLONOSCOPY      normal     COSMETIC SURGERY  2012    Final Stage of Mastectomy     DAVINCI HYSTERECTOMY TOTAL, BILATERAL SALPINGO-OOPHORECTOMY, COMBINED  2012    Procedure: COMBINED DAVINCI HYSTERECTOMY TOTAL, SALPINGO-OOPHORECTOMY;  Davinci Total Laparoscopic Hysterectomy, Bilateral Salpingo Oophorectomy, Pelvic Washings, Cystoscopy;  Surgeon: Evie Sheikh MD;  Location: [facility] OR     ENT SURGERY       GI SURGERY  2007    Gamaliel-en Y w cholecystectomy     GYN SURGERY  89,1/10/92    2 c-sections     HYSTERECTOMY, PAP NO LONGER INDICATED      DeVinci assister lap hyst with BSO     IRRIGATION AND DEBRIDEMENT BREAST  3/1/2012    Procedure:IRRIGATION AND DEBRIDEMENT BREAST; Irrigation and Debridement, Wound Closure Right Breast; Surgeon:JUNG GUILLEN; Location:[facility] OR     MASTECTOMY,  RECONSTRUCT BREAST, COMBINED  1/30/2012    Procedure:COMBINED MASTECTOMY, RECONSTRUCT BREAST; Bilateral Mastectomies, Right Axillary [place] Node Biopsy, Bilateral Breast Reconstruction with Tissue Expanders, Reconstruction of inframammary fold, bilateral pain management systems; Surgeon:ANUPAM CAMPBELL; Location: OR     RECONSTRUCT BREAST  8/31/2012    Procedure: RECONSTRUCT BREAST;  Bilateral 2nd stage breast reconstruction, revision,       SOFT TISSUE SURGERY  1-30-12    Mastectomy w severe myofascial pain syndrome     THYROIDECTOMY  7/10/2013    Procedure: THYROIDECTOMY;  Total Thyroidectomy with central neck dissection;  Surgeon: Indiana Sneed MD;  Location:  OR     TONSILLECTOMY      childhood     Allergies   Allergen Reactions     No Clinical Screening - See Comments Shortness Of Breath, Palpitations, Anaphylaxis, Itching, Swelling, Difficulty breathing and Rash     Sukhjinder wipes- oral allergy -  July 2015: throat tightness from a Chinese herbal medicine Wilmer Tran     Amitriptyline Hcl Swelling     Baclofen Other (See Comments) and Unknown     Other reaction(s): Edema     Birch Trees      Potatoes, carrots, cherries, celery, apple, pears, plums, peaches, parsnip, kiwi, hazelnuts, and apricots,      Blue Dyes Itching     Headaches       Cymbalta Other (See Comments)     Flushing, tremor/muscle twitching and edema     Gabapentin Other (See Comments)     edema  Systemic edema, weaned off from Feb to March per Dr. Dowd.    edema     Grass      Mugwort [Artemisia Vulgaris]      Various spices     Ragweeds      Melons, bananas, cucumbers, zucchini.     Topamax      Nortriptyline Itching, Visual Disturbance, Swelling, GI Disturbance, Anxiety, Other (See Comments) and Nausea     Other reaction(s): Swelling     Serotonin Anxiety, Swelling and Other (See Comments)          Medications:   I have reviewed this patient's medication profile.  MNPMP: reviewed      Data reviewed:  I reviewed recent  labs and imaging, comments on pertinent findings:  PET  IMPRESSION: In a patient with history of breast cancer and thyroid  cancer:  1. There is a new 3.2 cm hypermetabolic soft tissue mass that involves  the right 10th rib with extension into the neural foramen and minimal  bony erosion of the adjacent T10 vertebral body. Additionally, there  are new enlarged, FDG avid mediastinal and hilar lymph nodes. Findings  are concerning for malignancy.  2. 1 mm, nonobstructing nephrolithiasis in the inferior calyx of the  right kidney.       Thank you for involving us in the patient's care.   Edu Benitez MD / Palliative Medicine / Pager [phone] / [facility] Inpatient Team Consult Pager [phone] (answered 8am-430pm M-F) - ok to text page via [employer] / After-Hours Answering Service [phone] / Palliative Clinic in the [facility] at the [facility] - [phone] (scheduling); [phone] (triage).

## 2017-09-08 NOTE — TELEPHONE ENCOUNTER
Pt has an iron deficiency anemia.  I advised EGD.  Will schedule.    Talked to patient over phone.    Shahla Crawley MD

## 2017-09-08 NOTE — MR AVS SNAPSHOT
After Visit Summary   9/8/2017    Tisha Arias    MRN: [id number]           Patient Information     Date Of Birth          1960        Visit Information        Provider Department      9/8/2017 4:00 PM Edu Benitez MD [facility]        Today's Diagnoses     Neoplasm related pain    -  1    Chest wall pain          Care Instructions    Try [place] socks for your charbetty horse.              Follow-ups after your visit        Your next 10 appointments already scheduled     Sep 11, 2017  9:00 AM CDT   (Arrive by 8:45 AM)   New Patient Visit with DENZEL Zuniga [facility] ([facility])    [address] [place] [address] [id number] [phone]            Sep 13, 2017  8:20 AM CDT   (Arrive by 8:05 AM)   NEW LUNG NODULE with Brady Dobbs MD   [facility] ([facility])    [address] [place] [address] [id number] [phone]            Nov 02, 2017  5:00 PM CDT   US HEAD NECK SOFT TISSUE with UCUS3   [facility] ([facility])    [address] [place] [address] [id number] [phone]           Please bring a list of your medicines (including vitamins, minerals and over-the-counter drugs). Also, tell your doctor about any allergies you may have. Wear comfortable clothes and leave your valuables at home.  You do not need to do anything special to prepare for your exam.  Please call the Imaging Department at your exam site with any questions.            Nov 02, 2017  6:00 PM CDT   LAB with  LAB   [facility] ([facility])    [address] [place] [address] [id number] [phone]           Patient must bring picture ID. Patient should be prepared to give a urine specimen  Please do not eat 10-12 hours before your  appointment if you are coming in fasting for labs on lipids, cholesterol, or glucose (sugar). Pregnant women should follow their Care Team instructions. Water with medications is okay. Do not drink coffee or other fluids. If you have concerns about taking  your medications, please ask at office or if scheduling via [employer], send a message by clicking on Secure Messaging, Message Your Care Team.            Nov 13, 2017  4:30 PM CST   (Arrive by 4:15 PM)   RETURN ENDOCRINE with Avelina Castro MD   [facility] ([facility])    [address] [place] [address]  3rd [facility] [id number] [phone]            Nov 27, 2017  4:00 PM CST   (Arrive by 3:45 PM)   Return Visit with Shahla Crawley MD   [facility] ([facility])    [address] [place] [id number] [phone]              Future tests that were ordered for you today     Open Future Orders        Priority Expected Expires Ordered    CT Lung biopsy Routine  9/7/2018 9/7/2017            Who to contact     If you have questions or need follow up information about today's clinic visit or your schedule please contact [facility] directly at [phone].  Normal or non-critical lab and imaging results will be communicated to you by MyChart, letter or phone within 4 business days after the clinic has received the results. If you do not hear from us within 7 days, please contact the clinic through [employer] or phone. If you have a critical or abnormal lab result, we will notify you by phone as soon as possible.  Submit refill requests through [employer] or call your pharmacy and they will forward the refill request to us. Please allow 3 business days for your refill to be completed.          Additional Information About Your Visit        [employer] Information     [employer] gives you secure access to your electronic health record. If you see a  "primary care provider, you can also send messages to your care team and make appointments. If you have questions, please call your primary care clinic.  If you do not have a primary care provider, please call [phone] and they will assist you.        Care EveryWhere ID     This is your Care EveryWhere ID. This could be used by other organizations to access your [facility] medical records  [id number]        Your Vitals Were     Pulse Temperature Respirations Height Pulse Oximetry BMI (Body Mass Index)    88 98.2  F (36.8  C) (Oral) 16 1.651 m (5' 5\") 97% 32.62 kg/m2       Blood Pressure from Last 3 Encounters:   09/08/17 148/80   09/07/17 148/82   09/01/17 133/81    Weight from Last 3 Encounters:   09/08/17 88.9 kg (196 lb)   09/07/17 88.9 kg (196 lb)   09/01/17 89 kg (196 lb 3.2 oz)              Today, you had the following     No orders found for display         Today's Medication Changes          These changes are accurate as of: 9/8/17  5:49 PM.  If you have any questions, ask your nurse or doctor.               These medicines have changed or have updated prescriptions.        Dose/Directions    morphine 30 MG 12 hr tablet   Commonly known as:  MS CONTIN   This may have changed:    - medication strength  - how much to take  - how to take this  - when to take this  - additional instructions   Used for:  Chest wall pain   Changed by:  Edu Benitez MD        Dose:  30 mg   Start taking on:  9/13/2017   Take 1 tablet (30 mg) by mouth every 8 hours   Quantity:  90 tablet   Refills:  0       oxyCODONE 10 MG IR tablet   Commonly known as:  ROXICODONE   This may have changed:    - how much to take  - when to take this  - reasons to take this   Used for:  Neoplasm related pain   Changed by:  Edu Benitez MD        Dose:  15-20 mg   Take 1.5-2 tablets (15-20 mg) by mouth every 4 hours as needed   Quantity:  180 tablet   Refills:  0            Where to get your medicines      Some of these will need " a paper prescription and others can be bought over the counter.  Ask your nurse if you have questions.     Bring a paper prescription for each of these medications     diazepam 5 MG tablet    morphine 30 MG 12 hr tablet    oxyCODONE 10 MG IR tablet                Primary Care Provider Office Phone # Fax #    Shahla Raul Crawley [phone]       [address] [facility] [address]  [facility] [address]        Equal Access to Services     RODRIGO ZAMORA : Hadii aad ku hadasho Soomaali, waaxda luqadaha, qaybta kaalmada adeegyada, waxay idiin hayaan adeeg kharash la'daljitn ah. So [facility] [phone].    ATENCIÓN: Si lance mccollum, tiene a stiles disposición servicios gratuitos de asistencia lingüística. Llame al [phone].    We comply with applicable federal civil rights laws and [place] laws. We do not discriminate on the basis of race, color, national origin, age, disability sex, sexual orientation or gender identity.            Thank you!     Thank you for choosing [facility]  for your care. Our goal is always to provide you with excellent care. Hearing back from our patients is one way we can continue to improve our services. Please take a few minutes to complete the written survey that you may receive in the mail after your visit with us. Thank you!             Your Updated Medication List - Protect others around you: Learn how to safely use, store and throw away your medicines at www.disposemymeds.org.          This list is accurate as of: 9/8/17  5:49 PM.  Always use your most recent med list.                   Brand Name Dispense Instructions for use Diagnosis    ACAI PO      Take 1,000 mg by mouth 2 times daily    Breast cancer, unspecified laterality, Thyroid cancer (H), Chronic arthralgias of knees and hips, unspecified laterality       aluminum chloride 20 % external solution    DRYSOL    60 mL    Apply topically At Bedtime    Rash, Intertrigo       AZMACORT IN      Inhale 2 puffs into the  lungs as needed        BENADRYL PO      Take 50 mg by mouth as needed        BETAINE HCL PO      Take 2 tablets by mouth as needed Betaine HCL and Pepsin: Take 1-7 tabs by mouth daily, if burning take one tablet less once daily. Betaine HCL 1.04 g Pepsin 40 mg        calcitRIOL 0.25 MCG capsule    ROCALTROL    270 capsule    2 tabs in am and 1 tab qhs    Postsurgical hypothyroidism, Papillary carcinoma, follicular variant (H), Metastasis to cervical lymph node (H)       CALCIUM CITRATE +D 315-250 MG-UNIT Tabs per tablet   Generic drug:  calcium citrate-vitamin D     120 tablet    Take 2 tablets by mouth 3 times daily        celecoxib 200 MG capsule    celeBREX    180 capsule    Take 1 capsule (200 mg) by mouth 2 times daily    Chest wall pain       chlorhexidine 0.12 % solution    PERIDEX          * Colchicine 0.6 MG Caps     270 capsule    Take 0.6 mg by mouth 3 times daily for costochondritis (chest) discomfort. Scale back use to prn with loose stools or bloating.    Costal chondritis       * COLCRYS 0.6 MG tablet   Generic drug:  colchicine      Take 1.6 mg by mouth 3 times daily        COMPOUNDED NON-CONTROLLED SUBSTANCE - PHARMACY TO MIX COMPOUNDED MEDICATION    CMPD RX    120 g    Apply small amount to affected area two times daily.Ketamine 6% + ketoprofen 10% + lidocaine 10% in Lipo.    Chest wall pain       cromolyn 4 % ophthalmic solution    OPTICROM    10 mL    Place 1 drop into both eyes 4 times daily    Idiopathic mast cell activation syndrome (H)       cyclobenzaprine 10 MG tablet    FLEXERIL     Take 10 mg by mouth 3 times daily as needed On hold Dr Monsalve        dexamethasone 4 MG tablet    DECADRON    10 tablet    Take 1 tablet (4 mg) by mouth 2 times daily (with meals)    Spine metastasis (H), Malignant neoplasm of right breast (H), Hilar adenopathy       diazepam 5 MG tablet    VALIUM    90 tablet    Take 1 tablet (5 mg) by mouth 3 times daily as needed For muscle spasms    Chest wall pain        diclofenac 1 % Gel topical gel    VOLTAREN    100 g    Apply affected area two times daily PRN using enclosed dosing card.    Myofascial pain       docusate sodium 100 MG tablet    COLACE    60 tablet    Take 100 mg by mouth daily    Acute constipation       EPINEPHrine 0.3 MG/0.3ML injection 2-pack    EPIPEN 2-CARIDAD    0.6 mL    Inject 0.3 mLs (0.3 mg) into the muscle once as needed for anaphylaxis        hydrochlorothiazide 12.5 MG capsule    MICROZIDE    90 capsule    Take 1 capsule (12.5 mg) by mouth daily    Hypocalcemia       HYDROcodone-acetaminophen 5-325 MG per tablet    NORCO     TK 1 T PO Q 4 TO 6 H PRN P        levothyroxine 112 MCG tablet    SYNTHROID/LEVOTHROID    189 tablet    Mon-Sat: (2 tabs daily) Sunday: 3 tabs    Postsurgical hypothyroidism, Papillary carcinoma, follicular variant (H), Postsurgical hypoparathyroidism (H), Thyroid cancer (H)       lidocaine 5 % ointment    XYLOCAINE    35.44 g    SANJANA TOPICALLY QID PRN    Chest wall pain       Lidocaine HCl Monohydrate Powd           MAGNESIUM CITRATE PO      Take 400 mg by mouth daily        methocarbamol 750 MG tablet    ROBAXIN    360 tablet    Take 1 tablet (750 mg) by mouth 4 times daily as needed . Ok to take a 5th Robaxin on very severe days.    Myofascial pain       mometasone 110 MCG/INH inhaler    ASMANEX 30 METERED DOSES    3 Inhaler    Inhale 1 puff into the lungs daily    Intermittent asthma, uncomplicated       montelukast 10 MG tablet    SINGULAIR    120 tablet    TAKE TWO TABLETS BY MOUTH TWICE DAILY    Idiopathic mast cell activation syndrome (H)       morphine 30 MG 12 hr tablet   Start taking on:  9/13/2017    MS CONTIN    90 tablet    Take 1 tablet (30 mg) by mouth every 8 hours    Chest wall pain       NASALCROM 5.2 MG/ACT Aers Inhaler   Generic drug:  cromolyn sodium     1 Bottle    SPRAY ONE SPRAY( 1 ML) IN NOSTRIL DAILY    Mast cell disease, systemic (H)       OMEGA 3 PO      Take 5 mLs by mouth        ondansetron 4 MG ODT tab     ZOFRAN ODT    60 tablet    Take 1-2 tablets (4-8 mg) by mouth every 8 hours as needed for nausea or vomiting    Nausea with vomiting       * order for DME     2 Units    Equipment being ordered: compression stockings. 20-30 mm or 30 - 40 mm as patient can tolerate. Physical therapy to determine if they should be above or below the knee.    Venous stasis       * order for DME     2 Device    Equipment being ordered: compression bra    Malignant neoplasm of right female breast, unspecified site of breast       oxyCODONE 10 MG IR tablet    ROXICODONE    180 tablet    Take 1.5-2 tablets (15-20 mg) by mouth every 4 hours as needed    Neoplasm related pain       polyethylene glycol powder    MIRALAX    510 g    Take 17 g by mouth daily    Acute constipation       potassium chloride SA 20 MEQ CR tablet    potassium chloride    90 tablet    Take 1 tablet (20 mEq) by mouth daily    Hypokalemia       PROBIOTIC DAILY PO      Take 1 capsule by mouth daily Lacto acid bifidobacterium    Breast cancer, unspecified laterality, Thyroid cancer (H), Chronic arthralgias of knees and hips, unspecified laterality       ranitidine 75 MG tablet    ZANTAC    270 tablet    Take 1 tablet (75 mg) by mouth 3 times daily    Mast cell disease, systemic (H)       SUMAtriptan 50 MG tablet    IMITREX    5 tablet    Take 1 tablet (50 mg) by mouth at onset of headache for migraine (may repeat in 2 hours as needed, max 2 tablets daily)    Migraine without status migrainosus, not intractable, unspecified migraine type       tacrolimus 0.1 % ointment    PROTOPIC    60 g    Apply topically 2 times daily    Rash, Intertrigo       tamoxifen 20 MG tablet    NOLVADEX    90 tablet    Take 1 tablet (20 mg) by mouth daily    Malignant neoplasm of female breast, unspecified laterality, unspecified site of breast       triamcinolone 0.025 % ointment    KENALOG    80 g    Apply topically 2 times daily Mix with 1 lb jar of vaseline or aquaphor    Intertrigo, Rash        TUMS 500 MG chewable tablet   Generic drug:  calcium carbonate     180 chew tab    Take 2 tablets (1,000 mg) by mouth nightly as needed for other (cramps)        * UNABLE TO FIND      1 tablet 3 times daily MEDICATION NAME: calcium D-Glucarate        * UNABLE TO FIND      2 tablets 3 times daily MEDICATION NAME: Natural D-Hist    Breast cancer, unspecified laterality, Papillary carcinoma, follicular variant (H), Chronic musculoskeletal pain       * UNABLE TO FIND      MEDICATION NAME: Tumeric 3 capsules daily        * UNABLE TO FIND      1 tablet 3 times daily MEDICATION NAME: Digestzymes    Thyroid cancer (H), Postsurgical hypothyroidism, Postsurgical hypoparathyroidism (H)       * UNABLE TO FIND      1 tablet daily MEDICATION NAME: Pure Encapsulations    Thyroid cancer (H), Postsurgical hypothyroidism, Postsurgical hypoparathyroidism (H)       VENTOLIN  (90 BASE) MCG/ACT Inhaler   Generic drug:  albuterol     18 g    INHALE 2 PUFFS INTO THE LUNGS EVERY 4 HOURS AS NEEDED FOR SHORTNESS OF BREATH OR DIFFICULT BREATHING OR WHEEZING    Mild intermittent asthma without complication       vitamin D3 2000 UNITS Caps     60 capsule    Take 10,000 Units by mouth daily    Thyroid cancer (H), Postsurgical hypothyroidism, Papillary carcinoma, follicular variant (H), Postsurgical hypoparathyroidism (H)       ZYRTEC ALLERGY 10 MG tablet   Generic drug:  cetirizine     90 tablet    Take 1 tablet (10 mg) by mouth 3 times daily On hold for lab test.        * Notice:  This list has 9 medication(s) that are the same as other medications prescribed for you. Read the directions carefully, and ask your doctor or other care provider to review them with you.

## 2017-09-08 NOTE — LETTER
9/8/2017       RE: Tisha Archer  [address] [place] [address]     Dear Colleague,    Thank you for referring your patient, Tisha Archer, to the [facility] at [facility]. Please see a copy of my visit note below.    Palliative Staff/Outpatient Clinic    (This note was transcribed using voice recognition software. While I review and edit the transcription, I may miss errors. Please let me know of any serious mis-transcriptions and I will addend this note.)    CC/Patient ID:  Patient ID: chronic complex myofascial chest wall pain after breast cancer surgery and reconstruction   Saw Dr England (at my insistence) earlier in 2016 for a 2nd opinion - he recommended modest opioid increases which we did and it only seemed to improve her situation temporarily  Mood disturbance in this context   Follows closely also with acupuncture, massage, psychotherapy.   Is diagnosed also with MCAS and follows with Dr Avendaño  Thyroid cancer s/p resection, ablations  Sept 2017 - she developed escalating sternal and back pain and had a CT in ED, then PET showing R 10th rib-->10 mass, mediastinal and hilar lymphadenopathy    History:  Ms Archer is seen today with her therapist Darcy.  We reviewed her recent history. Her chest and back pain escalated and changed in character and intensity a couple weeks ago. Eventually she was seen in the emergency room and had a CT than PET scan and has presented presumably metastatic disease in her bone (10th rib) and mediastinal lymph nodes. She saw Dr. Crawley yesterday and biopsies are planned. Understandably she is very upset about this.    The emergency medicine doctor gave her some oxycodone 5 mg tablets. She told us these were doing a great job for her pain. Today she tells me that they have essentially done minimal for her pain. She only had a few doses of the 5 mg tablets I gave her some 10 mg several days ago pending this  "visit today. She says she is taking 10 mg of oxycodone about every 4-5 hours during the day. Again she reports minimal relief. She denies any side effects including operative changes and feels like her work performance is not impaired by the pain medicine. In fact is impaired by the pain.    The pain is substernal as well as she has a sensation of a knife shooting posterior from her xiphoid into her back. She remains on her other medications. The pain is also ripping and pleuritic. Dr. Crawley give her Decadron-she's only had a couple doses so far but it has not helped so far.    SH:   Reviewed and unchanged. 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     ROS:   stooling and urinating okay. No tingling or numbness down the legs. Swallowing okay.     PE: /80  Pulse 88  Temp 98.2  F (36.8  C) (Oral)  Resp 16  Ht 1.651 m (5' 5\")  Wt 88.9 kg (196 lb)  SpO2 97%  BMI 32.62 kg/m2  Alert distraught F   Eyes anicteric no injection  Neck s/p thyroidectomy, no masses  Lungs unlab, ctab  Chest wall - diffusely very tender to light palpation bilat, post, anterior. No clear masses.  CV tachy regular s1s2  No le edema  Affect flat, sad, tearful, congruent.     Impression & Recommendations:  56-year-old woman with chronic chest wall pain after breast cancer surgery, now with metastatic disease-biopsies pending in the etiology of this is not yet confirmed. Regardless she has clear tumor and this is escalated her pain. I recommended increasing the oxycodone to 15 mg q.4 hours for couple days, if that's not enough then increase it to 20 mg q.4 hours. Increase MS Contin to 30 mg t.i.d. Discussed cognitive effects from the opioids including driving safety and work performance. She is always being very careful and full. As in the past and I'm not particularly worried but we " did talk about it. Expressed my sorrow that her cancer has returned. She asks me questions about what the surgeries are like for this and I tell her I don't really know the answer to that. Follow-up in a month.    I discussed her situation and symptoms with Dr Crawley this morning in clinic.     -The above was transcribed using voice recognition software. While I review and edit the transcription, I may miss errors. Please let me know of any serious mis-transcriptions and I will addend this note.    Chart data reviewed today:    Family History   Problem Relation Age of Onset     Allergies Mother      Arthritis Mother      CANCER Mother      uterine/uterine     Hypertension Mother      on HCTZ     Hyperlipidemia Mother      CEREBROVASCULAR DISEASE Mother      s/p brain surgery     Breast Cancer Mother      Depression Mother      Anxiety Disorder Mother      Anesthesia Reaction Mother      Asthma Mother      Skin Cancer Mother      HEART DISEASE Father      DIABETES Father      Coronary Artery Disease Father      Hypertension Father      Hyperlipidemia Father      CEREBROVASCULAR DISEASE Father      Multiple strokes     Obesity Father      DIABETES Brother      Depression Brother      Hypertension Brother      CANCER Brother 57     pancreatic cancer     CANCER Maternal Grandfather      pancreatic cancer/stomach cancer     Prostate Cancer Maternal Grandfather      Prostrate and Bladder     Other Cancer Maternal Grandfather      Pancreatic 1988, Bladder 1977     CANCER Maternal Grandmother      uterine     Breast Cancer Maternal Grandmother      Skin Cancer Maternal Grandmother      DIABETES Brother      Breast Cancer Cousin      Grand mothers sister     Other Cancer Brother      Stage 3 Pancreatic 2-2015     Depression Brother      Asthma Brother      Obesity Brother      Anesthesia Reaction Other      H/a, itching, drug interactions     Asthma Other      Thyroid Disease No family hx of      CANCER Brother       Pancreatic      Past Medical History:   Diagnosis Date     Allergic rhinitis due to animal dander      Asthma      Breast cancer (H) 12    right     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 No real need for Epipen for this alone.     GDM (gestational diabetes mellitus)     w first pregnancy only     Gestational hypertension      Lymphedema     jackson arms, chest     Migraine      Neuropathy associated with cancer (H)      Papillary carcinoma, follicular variant (H) 2013    pT3, N1, Mx, thyroid     Post-surgical hypothyroidism      Renal disease     kidney stones     Rhinitis, allergic to other allergen      Right Breast mass and microcalcifications 2011     Seasonal allergic conjunctivitis      Seasonal allergic rhinitis 11 skin tests pos. for: dog/M/T/G/W--NEGATIVE FOOD TESTS FOR: shrimp, crab, lobster, coconut     Past Surgical History:   Procedure Laterality Date     BACK SURGERY  ,    Bulging disc w nerve root impigment     BIOPSY BREAST      right     BIOPSY BREAST  11    right, core and sterotactic     BYPASS GASTRIC, CHOLECYSTECTOMY, COMBINED       C LAPAROSCOPIC GASTRIC RESTRICTIVE PX, W/GASTRIC BYPASS/ GAMALIEL-EN-Y, < 150CM      Gamaliel en Y?      SECTION       COLONOSCOPY      normal     COSMETIC SURGERY  2012    Final Stage of Mastectomy     DAVINCI HYSTERECTOMY TOTAL, BILATERAL SALPINGO-OOPHORECTOMY, COMBINED  2012    Procedure: COMBINED DAVINCI HYSTERECTOMY TOTAL, SALPINGO-OOPHORECTOMY;  Davinci Total Laparoscopic Hysterectomy, Bilateral Salpingo Oophorectomy, Pelvic Washings, Cystoscopy;  Surgeon: Evie Sheikh MD;  Location: [facility] OR     ENT SURGERY       GI SURGERY  2007    Gamaliel-en Y w cholecystectomy     GYN SURGERY  89,1/10/92    2  c-sections     HYSTERECTOMY, PAP NO LONGER INDICATED  12/12    DeVinci assister lap hyst with BSO     IRRIGATION AND DEBRIDEMENT BREAST  3/1/2012    Procedure:IRRIGATION AND DEBRIDEMENT BREAST; Irrigation and Debridement, Wound Closure Right Breast; Surgeon:JUNG GUILLEN; Location:[facility] OR     MASTECTOMY, RECONSTRUCT BREAST, COMBINED  1/30/2012    Procedure:COMBINED MASTECTOMY, RECONSTRUCT BREAST; Bilateral Mastectomies, Right Axillary Colt Node Biopsy, Bilateral Breast Reconstruction with Tissue Expanders, Reconstruction of inframammary fold, bilateral pain management systems; Surgeon:ANUPAM CAMPBELL; Location:[facility] OR     RECONSTRUCT BREAST  8/31/2012    Procedure: RECONSTRUCT BREAST;  Bilateral 2nd stage breast reconstruction, revision,       SOFT TISSUE SURGERY  1-30-12    Mastectomy w severe myofascial pain syndrome     THYROIDECTOMY  7/10/2013    Procedure: THYROIDECTOMY;  Total Thyroidectomy with central neck dissection;  Surgeon: Indiana Sneed MD;  Location: [facility] OR     TONSILLECTOMY      childhood     Allergies   Allergen Reactions     No Clinical Screening - See Comments Shortness Of Breath, Palpitations, Anaphylaxis, Itching, Swelling, Difficulty breathing and Rash     Sukhjinder wipes- oral allergy -  July 2015: throat tightness from a Chinese herbal medicine Wilmer Tran     Amitriptyline Hcl Swelling     Baclofen Other (See Comments) and Unknown     Other reaction(s): Edema     Birch Trees      Potatoes, carrots, cherries, celery, apple, pears, plums, peaches, parsnip, kiwi, hazelnuts, and apricots,      Blue Dyes Itching     Headaches       Cymbalta Other (See Comments)     Flushing, tremor/muscle twitching and edema     Gabapentin Other (See Comments)     edema  Systemic edema, weaned off from Feb to March per Dr. Dowd.    edema     Grass      Mugwort [Artemisia Vulgaris]      Various spices     Ragweeds      Melons, bananas, cucumbers, zucchini.     Topamax      Nortriptyline Itching,  Visual Disturbance, Swelling, GI Disturbance, Anxiety, Other (See Comments) and Nausea     Other reaction(s): Swelling     Serotonin Anxiety, Swelling and Other (See Comments)          Medications:   I have reviewed this patient's medication profile.  MNPMP: reviewed      Data reviewed:  I reviewed recent labs and imaging, comments on pertinent findings:  PET  IMPRESSION: In a patient with history of breast cancer and thyroid  cancer:  1. There is a new 3.2 cm hypermetabolic soft tissue mass that involves  the right 10th rib with extension into the neural foramen and minimal  bony erosion of the adjacent T10 vertebral body. Additionally, there  are new enlarged, FDG avid mediastinal and hilar lymph nodes. Findings  are concerning for malignancy.  2. 1 mm, nonobstructing nephrolithiasis in the inferior calyx of the  right kidney.       Thank you for involving us in the patient's care.   Edu Benitez MD / Palliative Medicine / Pager [phone] / [facility] Inpatient Team Consult Pager [phone] (answered 8am-430pm M-F) - ok to text page via [employer] / After-Hours Answering Service [phone] / Palliative Clinic in the [facility] at the [facility] - [phone] (scheduling); [phone] (triage).

## 2017-09-11 ENCOUNTER — OFFICE VISIT (OUTPATIENT)
Dept: INTERVENTIONAL RADIOLOGY/VASCULAR | Facility: CLINIC | Age: 57
End: 2017-09-11

## 2017-09-11 ENCOUNTER — TELEPHONE (OUTPATIENT)
Dept: GASTROENTEROLOGY | Facility: CLINIC | Age: 57
End: 2017-09-11

## 2017-09-11 VITALS
OXYGEN SATURATION: 98 % | HEART RATE: 87 BPM | BODY MASS INDEX: 32.12 KG/M2 | WEIGHT: 193 LBS | DIASTOLIC BLOOD PRESSURE: 87 MMHG | SYSTOLIC BLOOD PRESSURE: 137 MMHG

## 2017-09-11 DIAGNOSIS — M89.9 BONE LESION: Primary | ICD-10-CM

## 2017-09-11 ASSESSMENT — ENCOUNTER SYMPTOMS
HEARTBURN: 0
HYPERTENSION: 0
SNORES LOUDLY: 0
VOMITING: 1
WEIGHT GAIN: 0
HEMOPTYSIS: 0
COUGH: 0
DYSPNEA ON EXERTION: 1
BRUISES/BLEEDS EASILY: 1
RECTAL BLEEDING: 0
ABDOMINAL PAIN: 1
CHILLS: 0
SYNCOPE: 0
SLEEP DISTURBANCES DUE TO BREATHING: 1
WHEEZING: 1
ARTHRALGIAS: 1
EYE IRRITATION: 1
NERVOUS/ANXIOUS: 1
HALLUCINATIONS: 0
SPEECH CHANGE: 0
EXERCISE INTOLERANCE: 1
TREMORS: 0
NECK MASS: 0
MYALGIAS: 1
DYSURIA: 0
SPUTUM PRODUCTION: 0
TASTE DISTURBANCE: 1
TROUBLE SWALLOWING: 0
WEIGHT LOSS: 0
BREAST PAIN: 1
FATIGUE: 1
RECTAL PAIN: 0
HEMATURIA: 0
FLANK PAIN: 1
BACK PAIN: 1
FEVER: 0
STIFFNESS: 1
WEAKNESS: 0
LEG SWELLING: 1
CONSTIPATION: 1
JOINT SWELLING: 0
HYPOTENSION: 0
ALTERED TEMPERATURE REGULATION: 1
DECREASED CONCENTRATION: 0
BLOATING: 0
MUSCLE CRAMPS: 1
INSOMNIA: 1
SWOLLEN GLANDS: 1
POSTURAL DYSPNEA: 1
SORE THROAT: 0
HOARSE VOICE: 0
NIGHT SWEATS: 1
MEMORY LOSS: 0
POLYPHAGIA: 0
DISTURBANCES IN COORDINATION: 0
SINUS CONGESTION: 0
POOR WOUND HEALING: 0
RESPIRATORY PAIN: 1
SKIN CHANGES: 0
SINUS PAIN: 0
PALPITATIONS: 0
DIFFICULTY URINATING: 1
SEIZURES: 0
BLOOD IN STOOL: 0
ORTHOPNEA: 1
SMELL DISTURBANCE: 0
LOSS OF CONSCIOUSNESS: 0
MUSCLE WEAKNESS: 0
DIZZINESS: 0
EYE WATERING: 0
JAUNDICE: 0
POLYDIPSIA: 0
DEPRESSION: 0
BOWEL INCONTINENCE: 0
NAUSEA: 1
CLAUDICATION: 1
NAIL CHANGES: 1
LEG PAIN: 0
LIGHT-HEADEDNESS: 1
TACHYCARDIA: 0
HEADACHES: 1
DIARRHEA: 0
DOUBLE VISION: 0
INCREASED ENERGY: 1
EYE PAIN: 0
PARALYSIS: 0
PANIC: 0
HOT FLASHES: 1
SHORTNESS OF BREATH: 1
BREAST MASS: 1
COUGH DISTURBING SLEEP: 0
DECREASED LIBIDO: 1
NUMBNESS: 0
DECREASED APPETITE: 1
NECK PAIN: 1
TINGLING: 0
EYE REDNESS: 1

## 2017-09-11 NOTE — MR AVS SNAPSHOT
After Visit Summary   9/11/2017    Tisha Arias    MRN: [id number]           Patient Information     Date Of Birth          1960        Visit Information        Provider Department      9/11/2017 9:00 AM María Adrian APRN [facility] Interventional Radiology        Today's Diagnoses     Bone lesion    -  1      Care Instructions    You are scheduled for your biopsy on Friday, September 15, 2017   Report to the [facility] Waiting room at 8:00 AM  The [facility] Waiting Room is located on the 2nd floor (street level) of the [address]  Your procedure is scheduled to start at approximately 9:30 AM    No solid foods or milk products for 6 hours prior on the day of the procedure.3:30 AM  You may have clear liquids until 2 hours prior on the day of the procedure.(water, apple juice, broth, coffee or jimmie without milk or sugar, jell-o, white grape juice) 7:30 AM    You will need a     If you have any questions you may call the Radiology Nurse Line [phone]          Follow-ups after your visit        Your next 10 appointments already scheduled     Sep 13, 2017  8:20 AM CDT   (Arrive by 8:05 AM)   NEW LUNG NODULE with Brady Dobbs MD   [facility] ([facility])    [address] [place] [id number] [phone]            Nov 02, 2017  5:00 PM CDT   US HEAD NECK SOFT TISSUE with UCUS3   [facility] ([facility])    [address] [place] [id number] [phone]           Please bring a list of your medicines (including vitamins, minerals and over-the-counter drugs). Also, tell your doctor about any allergies you may have. Wear comfortable clothes and leave your valuables at home.  You do not need to do anything special to prepare for your exam.  Please call the Imaging Department at your exam site with any questions.             Nov 02, 2017  6:00 PM CDT   LAB with  LAB   [facility] ([facility])    [address] [place] [id number] [phone]           Patient must bring picture ID. Patient should be prepared to give a urine specimen  Please do not eat 10-12 hours before your appointment if you are coming in fasting for labs on lipids, cholesterol, or glucose (sugar). Pregnant women should follow their Care Team instructions. Water with medications is okay. Do not drink coffee or other fluids. If you have concerns about taking  your medications, please ask at office or if scheduling via [employer], send a message by clicking on Secure Messaging, Message Your Care Team.            Nov 13, 2017  4:30 PM CST   (Arrive by 4:15 PM)   RETURN ENDOCRINE with Avelina Castro MD   [facility] ([facility])    [address] [place] [id number] [phone]            Nov 27, 2017  4:00 PM CST   (Arrive by 3:45 PM)   Return Visit with Shahla Crawley MD   [facility] ([facility])    [address] [place] [id number] [phone]              Future tests that were ordered for you today     Open Future Orders        Priority Expected Expires Ordered    CT Bone Biopsy Deep Routine  9/12/2018 9/11/2017            Who to contact     Please call your clinic at [phone] to:    Ask questions about your health    Make or cancel appointments    Discuss your medicines    Learn about your test results    Speak to your doctor   If you have compliments or concerns about an experience at your clinic, or if you wish to file a complaint, please contact [facility] Patient Relations at [phone] or email us at [email or web address]         Additional Information About Your Visit        [employer] Information     [employer] gives you secure  access to your electronic health record. If you see a primary care provider, you can also send messages to your care team and make appointments. If you have questions, please call your primary care clinic.  If you do not have a primary care provider, please call [phone] and they will assist you.      [employer] is an electronic gateway that provides easy, online access to your medical records. With [employer], you can request a clinic appointment, read your test results, renew a prescription or communicate with your care team.     To access your existing account, please contact your [facility] or call [phone] for assistance.        Care EveryWhere ID     This is your Care EveryWhere ID. This could be used by other organizations to access your [facility] medical records  [id number]        Your Vitals Were     Pulse Pulse Oximetry BMI (Body Mass Index)             87 98% 32.12 kg/m2          Blood Pressure from Last 3 Encounters:   09/11/17 137/87   09/08/17 148/80   09/07/17 148/82    Weight from Last 3 Encounters:   09/11/17 87.5 kg (193 lb)   09/08/17 88.9 kg (196 lb)   09/07/17 88.9 kg (196 lb)               Primary Care Provider Office Phone # Fax #    Shahla Crawley [phone]       [address] [place] [id number]        Equal Access to Services     RODRIGO ZAMORA : Hadii aad ku hadasho Soomaali, waaxda luqadaha, qaybta kaalmada adeegyada, ranjan martin. So [facility] [phone].    ATENCIÓN: Si habla español, tiene a stiles disposición servicios gratuitos de asistencia lingüística. Llnoel al [phone].    We comply with applicable federal civil rights laws and [place] laws. We do not discriminate on the basis of race, color, national origin, age, disability sex, sexual orientation or gender identity.            Thank you!     Thank you for choosing [facility] INTERVENTIONAL RADIOLOGY  for your care. Our goal is always to  provide you with excellent care. Hearing back from our patients is one way we can continue to improve our services. Please take a few minutes to complete the written survey that you may receive in the mail after your visit with us. Thank you!             Your Updated Medication List - Protect others around you: Learn how to safely use, store and throw away your medicines at www.disposemymeds.org.          This list is accurate as of: 9/11/17 10:01 AM.  Always use your most recent med list.                   Brand Name Dispense Instructions for use Diagnosis    ACAI PO      Take 1,000 mg by mouth 2 times daily    Breast cancer, unspecified laterality, Thyroid cancer (H), Chronic arthralgias of knees and hips, unspecified laterality       aluminum chloride 20 % external solution    DRYSOL    60 mL    Apply topically At Bedtime    Rash, Intertrigo       AZMACORT IN      Inhale 2 puffs into the lungs as needed        BENADRYL PO      Take 50 mg by mouth as needed        BETAINE HCL PO      Take 2 tablets by mouth as needed Betaine HCL and Pepsin: Take 1-7 tabs by mouth daily, if burning take one tablet less once daily. Betaine HCL 1.04 g Pepsin 40 mg        calcitRIOL 0.25 MCG capsule    ROCALTROL    270 capsule    2 tabs in am and 1 tab qhs    Postsurgical hypothyroidism, Papillary carcinoma, follicular variant (H), Metastasis to cervical lymph node (H)       CALCIUM CITRATE +D 315-250 MG-UNIT Tabs per tablet   Generic drug:  calcium citrate-vitamin D     120 tablet    Take 2 tablets by mouth 3 times daily        celecoxib 200 MG capsule    celeBREX    180 capsule    Take 1 capsule (200 mg) by mouth 2 times daily    Chest wall pain       chlorhexidine 0.12 % solution    PERIDEX          * Colchicine 0.6 MG Caps     270 capsule    Take 0.6 mg by mouth 3 times daily for costochondritis (chest) discomfort. Scale back use to prn with loose stools or bloating.    Costal chondritis       * COLCRYS 0.6 MG tablet   Generic  drug:  colchicine      Take 1.6 mg by mouth 3 times daily        COMPOUNDED NON-CONTROLLED SUBSTANCE - PHARMACY TO MIX COMPOUNDED MEDICATION    CMPD RX    120 g    Apply small amount to affected area two times daily.Ketamine 6% + ketoprofen 10% + lidocaine 10% in Lipo.    Chest wall pain       cromolyn 4 % ophthalmic solution    OPTICROM    10 mL    Place 1 drop into both eyes 4 times daily    Idiopathic mast cell activation syndrome (H)       cyclobenzaprine 10 MG tablet    FLEXERIL     Take 10 mg by mouth 3 times daily as needed On hold Dr Monsalve        dexamethasone 4 MG tablet    DECADRON    10 tablet    Take 1 tablet (4 mg) by mouth 2 times daily (with meals)    Spine metastasis (H), Malignant neoplasm of right breast (H), Hilar adenopathy       diazepam 5 MG tablet    VALIUM    90 tablet    Take 1 tablet (5 mg) by mouth 3 times daily as needed For muscle spasms    Chest wall pain       diclofenac 1 % Gel topical gel    VOLTAREN    100 g    Apply affected area two times daily PRN using enclosed dosing card.    Myofascial pain       docusate sodium 100 MG tablet    COLACE    60 tablet    Take 100 mg by mouth daily    Acute constipation       EPINEPHrine 0.3 MG/0.3ML injection 2-pack    EPIPEN 2-CARIDAD    0.6 mL    Inject 0.3 mLs (0.3 mg) into the muscle once as needed for anaphylaxis        hydrochlorothiazide 12.5 MG capsule    MICROZIDE    90 capsule    Take 1 capsule (12.5 mg) by mouth daily    Hypocalcemia       HYDROcodone-acetaminophen 5-325 MG per tablet    NORCO     TK 1 T PO Q 4 TO 6 H PRN P        levothyroxine 112 MCG tablet    SYNTHROID/LEVOTHROID    189 tablet    Mon-Sat: (2 tabs daily) Sunday: 3 tabs    Postsurgical hypothyroidism, Papillary carcinoma, follicular variant (H), Postsurgical hypoparathyroidism (H), Thyroid cancer (H)       lidocaine 5 % ointment    XYLOCAINE    35.44 g    SANJANA TOPICALLY QID PRN    Chest wall pain       Lidocaine HCl Monohydrate Powd           MAGNESIUM CITRATE PO       Take 400 mg by mouth daily        methocarbamol 750 MG tablet    ROBAXIN    360 tablet    Take 1 tablet (750 mg) by mouth 4 times daily as needed . Ok to take a 5th Robaxin on very severe days.    Myofascial pain       mometasone 110 MCG/INH inhaler    ASMANEX 30 METERED DOSES    3 Inhaler    Inhale 1 puff into the lungs daily    Intermittent asthma, uncomplicated       montelukast 10 MG tablet    SINGULAIR    120 tablet    TAKE TWO TABLETS BY MOUTH TWICE DAILY    Idiopathic mast cell activation syndrome (H)       morphine 30 MG 12 hr tablet   Start taking on:  9/13/2017    MS CONTIN    90 tablet    Take 1 tablet (30 mg) by mouth every 8 hours    Chest wall pain       NASALCROM 5.2 MG/ACT Aers Inhaler   Generic drug:  cromolyn sodium     1 Bottle    SPRAY ONE SPRAY( 1 ML) IN NOSTRIL DAILY    Mast cell disease, systemic (H)       OMEGA 3 PO      Take 5 mLs by mouth        ondansetron 4 MG ODT tab    ZOFRAN ODT    60 tablet    Take 1-2 tablets (4-8 mg) by mouth every 8 hours as needed for nausea or vomiting    Nausea with vomiting       * order for DME     2 Units    Equipment being ordered: compression stockings. 20-30 mm or 30 - 40 mm as patient can tolerate. Physical therapy to determine if they should be above or below the knee.    Venous stasis       * order for DME     2 Device    Equipment being ordered: compression bra    Malignant neoplasm of right female breast, unspecified site of breast       oxyCODONE 10 MG IR tablet    ROXICODONE    180 tablet    Take 1.5-2 tablets (15-20 mg) by mouth every 4 hours as needed    Neoplasm related pain       polyethylene glycol powder    MIRALAX    510 g    Take 17 g by mouth daily    Acute constipation       potassium chloride SA 20 MEQ CR tablet    potassium chloride    90 tablet    Take 1 tablet (20 mEq) by mouth daily    Hypokalemia       PROBIOTIC DAILY PO      Take 1 capsule by mouth daily Lacto acid bifidobacterium    Breast cancer, unspecified laterality, Thyroid  cancer (H), Chronic arthralgias of knees and hips, unspecified laterality       ranitidine 75 MG tablet    ZANTAC    270 tablet    Take 1 tablet (75 mg) by mouth 3 times daily    Mast cell disease, systemic (H)       SUMAtriptan 50 MG tablet    IMITREX    5 tablet    Take 1 tablet (50 mg) by mouth at onset of headache for migraine (may repeat in 2 hours as needed, max 2 tablets daily)    Migraine without status migrainosus, not intractable, unspecified migraine type       tacrolimus 0.1 % ointment    PROTOPIC    60 g    Apply topically 2 times daily    Rash, Intertrigo       tamoxifen 20 MG tablet    NOLVADEX    90 tablet    Take 1 tablet (20 mg) by mouth daily    Malignant neoplasm of female breast, unspecified laterality, unspecified site of breast       triamcinolone 0.025 % ointment    KENALOG    80 g    Apply topically 2 times daily Mix with 1 lb jar of vaseline or aquaphor    Intertrigo, Rash       TUMS 500 MG chewable tablet   Generic drug:  calcium carbonate     180 chew tab    Take 2 tablets (1,000 mg) by mouth nightly as needed for other (cramps)        * UNABLE TO FIND      1 tablet 3 times daily MEDICATION NAME: calcium D-Glucarate        * UNABLE TO FIND      2 tablets 3 times daily MEDICATION NAME: Natural D-Hist    Breast cancer, unspecified laterality, Papillary carcinoma, follicular variant (H), Chronic musculoskeletal pain       * UNABLE TO FIND      MEDICATION NAME: Tumeric 3 capsules daily        * UNABLE TO FIND      1 tablet 3 times daily MEDICATION NAME: Digestzymes    Thyroid cancer (H), Postsurgical hypothyroidism, Postsurgical hypoparathyroidism (H)       * UNABLE TO FIND      1 tablet daily MEDICATION NAME: Pure Encapsulations    Thyroid cancer (H), Postsurgical hypothyroidism, Postsurgical hypoparathyroidism (H)       VENTOLIN  (90 BASE) MCG/ACT Inhaler   Generic drug:  albuterol     18 g    INHALE 2 PUFFS INTO THE LUNGS EVERY 4 HOURS AS NEEDED FOR SHORTNESS OF BREATH OR DIFFICULT  BREATHING OR WHEEZING    Mild intermittent asthma without complication       vitamin D3 2000 UNITS Caps     60 capsule    Take 10,000 Units by mouth daily    Thyroid cancer (H), Postsurgical hypothyroidism, Papillary carcinoma, follicular variant (H), Postsurgical hypoparathyroidism (H)       ZYRTEC ALLERGY 10 MG tablet   Generic drug:  cetirizine     90 tablet    Take 1 tablet (10 mg) by mouth 3 times daily On hold for lab test.        * Notice:  This list has 9 medication(s) that are the same as other medications prescribed for you. Read the directions carefully, and ask your doctor or other care provider to review them with you.

## 2017-09-11 NOTE — PROGRESS NOTES
First Name: Tisha   Age: 56 year old   Referring Physician: Dr. Crawley   REASON FOR REFERRAL: Consult for soft tissue mass that invades the right 10th rib and abuts the T10 vertebra.    Assessment:  Elvin is a 55 yo with a hx of infiltrating ductal carcinoma, which his ER/AL + and HER2 negative.  She is post surgery and is on tamoxifen.  She also has a hx of papillary carcinoma and is post resection and has had radioiodine tx.    She has mast cell activation syndrome.  PET CT scan shows a 3.2 cm soft tissue mass that invades the 10th rib on the right side and abuts T10 and has an SUV of 52.  Biopsy is requested.    Plan:  Image guided biopsy of soft tissue mass that invades the 10th rib and abuts the T10 vertebra.  In addition to pathology send for HER2.  Patient has up to date blood work.    HPI: This is a pt with a history of T1c N0 M0, infiltrating ductal carcinoma of the right breast with a strong family history of breast cancer. BRCA1 and 2 gene testing negative.  She was diagnosed with breast cancer in 12/2011.  She underwent bilateral mastectomy with immediate reconstruction on 01/30/2012 by Dr. Daugherty.  Her final pathology revealed 1.6-cm, infiltrating ductal carcinoma, grade 1/3, no angiolymphatic space invasion, no nipple or skin invasion.  There was associated DCIS, and the margins were free of tumor superiorly, anterior margin by 0.1 cm, and widely free at remaining margins.  This was ER/AL-positive, HER2-negative in the vast majority of cells and non-amplified with a ratio of 1.1.  She had an Oncotype test performed, which revealed a low-risk score of 11.  That put her overall risk of recurrence at about 7% after 5 years of tamoxifen.  She had significant complications postoperatively and eventually in 02/2012, she was started on Arimidex.  Also in 12/2012, she had prophylactic hysterectomy and oophorectomy, the pathology of which was benign.  Her course has been complicated by extreme chest  "wall pain, myofascial pain, and neuropathic pain.  She has gone through several different clinics and was put on several different medications, which were not doing her any good.  Eventually she was weaned off all the medications and is currently doing only PT with myofascial pain maneuvers with symptomatic improvement.       Incidentally, given that she was having so many symptoms, she underwent a PET scan in 4/2013, which revealed a thyroid nodule which was biopsied and was consistent with papillary carcinoma.  She has undergone resection as well as radioiodine treatment since and has done relatively well from the surgery.  She follows up with Dr. Castro for the same.  She unfortunately required etoh ablation therapy over the thyroid lymph nodes.   She has not had all of the nodes treated; she remains under the care of Dr Avelina Castro.       Due to severe myofascial pain, arthralgia, she was switched to tamoxifen in 2/2014.  She had a diagnosis of mast cell activation syndrome in 2015; she sees Dr Brian Avendaño for this.  September 1 she called into the oncology nurse triage line c/o chest pain and was referred tot he ER.  There she had a CT of the chest and was found to have a right 10th rib mass that abuts the T10 vertebral body.  She says she has c/o of pain in that area for years.  There is an MRI from 2014 which does not show any abnormalities.  She had a PET CT scan performed after the ER visit, the 3.2 cm soft tissue mass has a SUV max of 52.      Elvin is quite discouraged because there are also lymph nodes in her chest that are PET avid.  She is to see pulmonary on Wednesday.  She wants \"it all cut out\".  I told her that, that might not be feasible.  She wanted to make she the caregivers knew that it takes higher amounts of lidocaine to numb her.       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 No real need for Epipen for this alone.     GDM (gestational diabetes mellitus)     w first pregnancy only     Gestational hypertension      Lymphedema     jackson arms, chest     Migraine      Neuropathy associated with cancer (H)      Papillary carcinoma, follicular variant (H) 2013    pT3, N1, Mx, thyroid     Post-surgical hypothyroidism      Renal disease     kidney stones     Rhinitis, allergic to other allergen      Right Breast mass and microcalcifications 2011     Seasonal allergic conjunctivitis      Seasonal allergic rhinitis 11 skin tests pos. for: dog/M/T/G/W--NEGATIVE FOOD TESTS FOR: shrimp, crab, lobster, coconut     PAST SURGICAL HISTORY:   Past Surgical History:   Procedure Laterality Date     BACK SURGERY  ,    Bulging disc w nerve root impigment     BIOPSY BREAST      right     BIOPSY BREAST  11    right, core and sterotactic     BYPASS GASTRIC, CHOLECYSTECTOMY, COMBINED       C LAPAROSCOPIC GASTRIC RESTRICTIVE PX, W/GASTRIC BYPASS/ GAMALIEL-EN-Y, < 150CM      Gamaliel en Y?      SECTION       COLONOSCOPY      normal     COSMETIC SURGERY  2012    Final Stage of Mastectomy     DAVINCI HYSTERECTOMY TOTAL, BILATERAL SALPINGO-OOPHORECTOMY, COMBINED  2012    Procedure: COMBINED DAVINCI HYSTERECTOMY TOTAL, SALPINGO-OOPHORECTOMY;  Davinci Total Laparoscopic Hysterectomy, Bilateral Salpingo Oophorectomy, Pelvic Washings, Cystoscopy;  Surgeon: Evie Sheikh MD;  Location: [facility] OR     ENT SURGERY       GI SURGERY  2007    Gamaliel-en Y w cholecystectomy     GYN SURGERY  89,1/10/92    2 c-sections     HYSTERECTOMY, PAP NO LONGER INDICATED      DeVinci assister lap hyst with BSO     IRRIGATION AND  DEBRIDEMENT BREAST  3/1/2012    Procedure:IRRIGATION AND DEBRIDEMENT BREAST; Irrigation and Debridement, Wound Closure Right Breast; Surgeon:JUNG GUILLEN; Location:[facility] OR     MASTECTOMY, RECONSTRUCT BREAST, COMBINED  1/30/2012    Procedure:COMBINED MASTECTOMY, RECONSTRUCT BREAST; Bilateral Mastectomies, Right Axillary [place] Node Biopsy, Bilateral Breast Reconstruction with Tissue Expanders, Reconstruction of inframammary fold, bilateral pain management systems; Surgeon:ANUPAM CAMPBELL; Location:[facility] OR     RECONSTRUCT BREAST  8/31/2012    Procedure: RECONSTRUCT BREAST;  Bilateral 2nd stage breast reconstruction, revision,       SOFT TISSUE SURGERY  1-30-12    Mastectomy w severe myofascial pain syndrome     THYROIDECTOMY  7/10/2013    Procedure: THYROIDECTOMY;  Total Thyroidectomy with central neck dissection;  Surgeon: Indiana Sneed MD;  Location: [facility] OR     TONSILLECTOMY      childhood     FAMILY HISTORY:   Family History   Problem Relation Age of Onset     Allergies Mother      Arthritis Mother      CANCER Mother      uterine/uterine     Hypertension Mother      on HCTZ     Hyperlipidemia Mother      CEREBROVASCULAR DISEASE Mother      s/p brain surgery     Breast Cancer Mother      Depression Mother      Anxiety Disorder Mother      Anesthesia Reaction Mother      Asthma Mother      Skin Cancer Mother      HEART DISEASE Father      DIABETES Father      Coronary Artery Disease Father      Hypertension Father      Hyperlipidemia Father      CEREBROVASCULAR DISEASE Father      Multiple strokes     Obesity Father      DIABETES Brother      Depression Brother      Hypertension Brother      CANCER Maternal Grandfather      pancreatic cancer/stomach cancer     Prostate Cancer Maternal Grandfather      Prostrate and Bladder     Other Cancer Maternal Grandfather      Pancreatic 1988, Bladder 1977     CANCER Maternal Grandmother      uterine     Breast Cancer Maternal Grandmother      Skin Cancer Maternal  Grandmother      CANCER Brother 57     pancreatic cancer     DIABETES Brother      Breast Cancer Cousin      Grand mothers sister     Other Cancer Brother      Stage 3 Pancreatic 2-2015     Depression Brother      Asthma Brother      Obesity Brother      Anesthesia Reaction Other      H/a, itching, drug interactions     Asthma Other      CANCER Brother      Pancreatic      Thyroid Disease No family hx of      SOCIAL HISTORY:   Social History   Substance Use Topics     Smoking status: Never Smoker     Smokeless tobacco: Never Used      Comment: no 2nd hand smoke exposure     Alcohol use Yes      Comment: rare     PROBLEM LIST:   Patient Active Problem List    Diagnosis Date Noted     Personal history of malignant neoplasm of breast 01/23/2017     Priority: Medium     Status post bariatric surgery 11/02/2016     Priority: Medium     Hypocalcemia 11/02/2016     Priority: Medium     Migraine without status migrainosus, not intractable, unspecified migraine type 10/03/2016     Priority: Medium     Rash 08/07/2016     Priority: Medium     Intertrigo 08/03/2016     Priority: Medium     Chest wall pain 04/20/2016     Priority: Medium     Mild intermittent asthma without complication 12/21/2015     Priority: Medium     Idiopathic mast cell activation syndrome (H) 11/03/2015     Priority: Medium     Vitamin D deficiency 08/21/2015     Priority: Medium     Chronic pain, post bilateral mastectomies and breast reconstruction  08/21/2015     Priority: Medium     Patient is followed by Palliative care for ongoing prescription of pain medication.  All refills should be approved by this provider, or covering partner.    Medication(s): morphine BID  Maximum quantity per month: Per palliative care   Clinic visit frequency required:       Controlled substance agreement on file: No    Pain Clinic evaluation in the past: Yes       Location(s):  [facility] Total Score(s):  No flowsheet data found.    Last [facility] website  verification:  none   [email or web address]         Metastasis to cervical lymph node (H) 09/26/2014     Priority: Medium     S/P breast reconstruction 05/28/2014     Priority: Medium     S/P thyroidectomy 07/10/2013     Priority: Medium     Postsurgical hypoparathyroidism (H) 07/10/2013     Priority: Medium     Postsurgical hypothyroidism 07/10/2013     Priority: Medium     Papillary carcinoma, follicular variant (H) 06/28/2013     Priority: Medium     Medication side effects 06/28/2013     Priority: Medium     Baclofen and topamax       Neuropathic pain of chest 05/30/2013     Priority: Medium     Atrophic vaginitis 05/06/2013     Priority: Medium     Lymphedema of breast 03/06/2013     Priority: Medium     Family history of uterine cancer 07/26/2012     Priority: Medium     Infection 03/06/2012     Priority: Medium     Cellulitis 03/06/2012     Priority: Medium     Breast cancer (H) 02/09/2012     Priority: Medium     DCIS, bilateral mastectomies, 1/2012       DCIS (ductal carcinoma in situ) of right breast 12/29/2011     Priority: Medium     CARDIOVASCULAR SCREENING; LDL GOAL LESS THAN 160 06/09/2011     Priority: Medium     Seasonal allergic rhinitis      Priority: Medium     Allergic rhinitis due to animal dander      Priority: Medium     Food intolerance      Priority: Medium     MEDICATIONS:   Prescription Medications as of 9/11/2017             oxyCODONE (ROXICODONE) 10 MG IR tablet Take 1.5-2 tablets (15-20 mg) by mouth every 4 hours as needed    morphine (MS CONTIN) 30 MG 12 hr tablet Starting on 9/13/2017. Take 1 tablet (30 mg) by mouth every 8 hours    diazepam (VALIUM) 5 MG tablet Take 1 tablet (5 mg) by mouth 3 times daily as needed For muscle spasms    levothyroxine (SYNTHROID/LEVOTHROID) 112 MCG tablet Mon-Sat: (2 tabs daily) Sunday: 3 tabs    dexamethasone (DECADRON) 4 MG tablet Take 1 tablet (4 mg) by mouth 2 times daily (with meals)    hydrochlorothiazide (MICROZIDE) 12.5 MG capsule Take 1  capsule (12.5 mg) by mouth daily    SUMAtriptan (IMITREX) 50 MG tablet Take 1 tablet (50 mg) by mouth at onset of headache for migraine (may repeat in 2 hours as needed, max 2 tablets daily)    COLCRYS 0.6 MG tablet Take 1.6 mg by mouth 3 times daily    celecoxib (CELEBREX) 200 MG capsule Take 1 capsule (200 mg) by mouth 2 times daily    montelukast (SINGULAIR) 10 MG tablet TAKE TWO TABLETS BY MOUTH TWICE DAILY    chlorhexidine (PERIDEX) 0.12 % solution     HYDROcodone-acetaminophen (NORCO) 5-325 MG per tablet TK 1 T PO Q 4 TO 6 H PRN P    Colchicine 0.6 MG CAPS Take 0.6 mg by mouth 3 times daily for costochondritis (chest) discomfort. Scale back use to prn with loose stools or bloating.    EPINEPHrine (EPIPEN 2-CARIDAD) 0.3 MG/0.3ML injection Inject 0.3 mLs (0.3 mg) into the muscle once as needed for anaphylaxis    methocarbamol (ROBAXIN) 750 MG tablet Take 1 tablet (750 mg) by mouth 4 times daily as needed . Ok to take a 5th Robaxin on very severe days.    potassium chloride SA (POTASSIUM CHLORIDE) 20 MEQ CR tablet Take 1 tablet (20 mEq) by mouth daily    VENTOLIN  (90 BASE) MCG/ACT Inhaler INHALE 2 PUFFS INTO THE LUNGS EVERY 4 HOURS AS NEEDED FOR SHORTNESS OF BREATH OR DIFFICULT BREATHING OR WHEEZING    cromolyn (OPTICROM) 4 % ophthalmic solution Place 1 drop into both eyes 4 times daily    NASALCROM 5.2 MG/ACT AERS Inhaler SPRAY ONE SPRAY( 1 ML) IN NOSTRIL DAILY    Cholecalciferol (VITAMIN D3) 2000 UNITS CAPS Take 10,000 Units by mouth daily    ondansetron (ZOFRAN ODT) 4 MG ODT tab Take 1-2 tablets (4-8 mg) by mouth every 8 hours as needed for nausea or vomiting    COMPOUND (CMPD RX) - PHARMACY TO MIX COMPOUNDED MEDICATION Apply small amount to affected area two times daily.Ketamine 6% + ketoprofen 10% + lidocaine 10% in Lipo.    lidocaine (XYLOCAINE) 5 % ointment SANJANA TOPICALLY QID PRN    order for DME Equipment being ordered: compression stockings. 20-30 mm or 30 - 40 mm as patient can tolerate. Physical  therapy to determine if they should be above or below the knee.    order for DME Equipment being ordered: compression bra    ranitidine (ZANTAC) 75 MG tablet Take 1 tablet (75 mg) by mouth 3 times daily    tamoxifen (NOLVADEX) 20 MG tablet Take 1 tablet (20 mg) by mouth daily    Lidocaine HCl Monohydrate POWD     calcitRIOL (ROCALTROL) 0.25 MCG capsule 2 tabs in am and 1 tab qhs    aluminum chloride (DRYSOL) 20 % external solution Apply topically At Bedtime    tacrolimus (PROTOPIC) 0.1 % ointment Apply topically 2 times daily    triamcinolone (KENALOG) 0.025 % ointment Apply topically 2 times daily Mix with 1 lb jar of vaseline or aquaphor    UNABLE TO FIND MEDICATION NAME: Tumeric 3 capsules daily    diclofenac (VOLTAREN) 1 % GEL Apply affected area two times daily PRN using enclosed dosing card.    mometasone (ASMANEX 30 METERED DOSES) 110 MCG/INH inhaler Inhale 1 puff into the lungs daily    UNABLE TO FIND 2 tablets 3 times daily MEDICATION NAME: Natural D-Hist    MAGNESIUM CITRATE PO Take 400 mg by mouth daily    DiphenhydrAMINE HCl (BENADRYL PO) Take 50 mg by mouth as needed    calcium citrate-vitamin D (CALCIUM CITRATE +D) 315-250 MG-UNIT TABS Take 2 tablets by mouth 3 times daily    calcium carbonate (TUMS) 500 MG chewable tablet Take 2 tablets (1,000 mg) by mouth nightly as needed for other (cramps)    UNABLE TO FIND 1 tablet 3 times daily MEDICATION NAME: calcium D-Glucarate    Digestive Enzymes (BETAINE HCL PO) Take 2 tablets by mouth as needed Betaine HCL and Pepsin: Take 1-7 tabs by mouth daily, if burning take one tablet less once daily.  Betaine HCL 1.04 g  Pepsin 40 mg    Triamcinolone Acetonide (AZMACORT IN) Inhale 2 puffs into the lungs as needed    UNABLE TO FIND 1 tablet 3 times daily MEDICATION NAME: Digestzymes    UNABLE TO FIND 1 tablet daily MEDICATION NAME: Pure Encapsulations    cyclobenzaprine (FLEXERIL) 10 MG tablet Take 10 mg by mouth 3 times daily as needed On hold Dr Gricel Juárez  Product (PROBIOTIC DAILY PO) Take 1 capsule by mouth daily Lacto acid bifidobacterium    polyethylene glycol (MIRALAX) powder Take 17 g by mouth daily    docusate sodium 100 MG tablet Take 100 mg by mouth daily    cetirizine (ZYRTEC ALLERGY) 10 MG tablet Take 1 tablet (10 mg) by mouth 3 times daily On hold for lab test.    Omega-3 Fatty Acids (OMEGA 3 PO) Take 5 mLs by mouth    ACAI PO Take 1,000 mg by mouth 2 times daily        ALLERGIES: Baclofen; No clinical screening - see comments; Serotonin; Amitriptyline hcl; Birch trees; Blue dyes; Cymbalta; Gabapentin; Grass; Mugwort [artemisia vulgaris]; Ragweeds; Topamax; and Nortriptyline  VITALS: /87  Pulse 87  Wt 87.5 kg (193 lb)  SpO2 98%  BMI 32.12 kg/m2    ROS:  Answers for HPI/ROS submitted by the patient on 9/11/2017   General Symptoms: Yes  Skin Symptoms: Yes  HENT Symptoms: Yes  EYE SYMPTOMS: Yes  HEART SYMPTOMS: Yes  LUNG SYMPTOMS: Yes  INTESTINAL SYMPTOMS: Yes  URINARY SYMPTOMS: Yes  GYNECOLOGIC SYMPTOMS: Yes  BREAST SYMPTOMS: Yes  SKELETAL SYMPTOMS: Yes  BLOOD SYMPTOMS: Yes  NERVOUS SYSTEM SYMPTOMS: Yes  MENTAL HEALTH SYMPTOMS: Yes  Fever: No  Loss of appetite: Yes  Weight loss: No  Weight gain: No  Fatigue: Yes  Night sweats: Yes  Chills: No  Increased stress: Yes  Excessive hunger: No  Excessive thirst: No  Feeling hot or cold when others believe the temperature is normal: Yes  Loss of height: No  Post-operative complications: Yes  Surgical site pain: Yes  Hallucinations: No  Change in or Loss of Energy: Yes  Hyperactivity: No  Confusion: No  Changes in hair: Yes  Changes in moles/birth marks: No  Itching: Yes  Rashes: No  Changes in nails: Yes  Acne: No  Hair in places you don't want it: No  Change in facial hair: No  Warts: No  Non-healing sores: No  Scarring: No  Flaking of skin: Yes  Color changes of hands/feet in cold : No  Sun sensitivity: No  Skin thickening: No  Ear pain: No  Ear discharge: No  Hearing loss: No  Tinnitus: No  Nosebleeds:  No  Congestion: No  Sinus pain: No  Trouble swallowing: No   Voice hoarseness: No  Mouth sores: No  Sore throat: No  Tooth pain: No  Gum tenderness: No  Bleeding gums: Yes  Change in taste: Yes  Change in sense of smell: No  Dry mouth: Yes  Hearing aid used: No  Neck lump: No  Eye pain: No  Vision loss: No  Dry eyes: Yes  Watery eyes: No  Eye bulging: No  Double vision: No  Flashing of lights: No  Spots: No  Floaters: No  Redness: Yes  Crossed eyes: No  Tunnel Vision: No  Yellowing of eyes: No  Eye irritation: Yes  Cough: No  Sputum or phlegm: No  Coughing up blood: No  Difficulty breating or shortness of breath: Yes  Snoring: No  Wheezing: Yes  Difficulty breathing on exertion: Yes  Respiratory pain: Yes  Nighttime Cough: No  Difficulty breathing when lying flat: Yes  Chest pain or pressure: Yes  Fast or irregular heartbeat: No  Pain in legs with walking: No  Swelling in feet or ankles: Yes  Trouble breathing while lying down: Yes  Fingers or Toes appear blue: No  High blood pressure: No  Low blood pressure: No  Fainting: No  Murmurs: No  Chest pain on exertion: Yes  Chest pain at rest: Yes  Cramping pain in leg during exercise: Yes  Pacemaker: No  Varicose veins: No  Edema or swelling: Yes  Fast heart beat: No  Wake up at night with shortness of breath: Yes  Heart flutters: No  Light-headedness: Yes  Exercise intolerance: Yes  Heart burn or indigestion: No  Nausea: Yes  Vomiting: Yes  Abdominal pain: Yes  Bloating: No  Constipation: Yes  Diarrhea: No  Blood in stool: No  Black stools: No  Rectal or Anal pain: No  Fecal incontinence: No  Rectal bleeding: No  Yellowing of skin or eyes: No  Vomit with blood: No  Change in stools: No  Hemorrhoids: No  Trouble holding urine or incontinence: Yes  Pain or burning: No  Trouble starting or stopping: Yes  Increased frequency of urination: No  Blood in urine: No  Decreased frequency of urination: No  Frequent nighttime urination: Yes  Flank pain: Yes  Difficulty emptying  bladder: Yes  Back pain: Yes  Muscle aches: Yes  Neck pain: Yes  Swollen joints: No  Joint pain: Yes  Bone pain: Yes  Muscle cramps: Yes  Muscle weakness: No  Joint stiffness: Yes  Bone fracture: No  Anemia: Yes  Swollen glands: Yes  Easy bleeding or bruising: Yes  Trouble with coordination: No  Dizziness or trouble with balance: No  Fainting or black-out spells: No  Memory loss: No  Headache: Yes  Seizures: No  Speech problems: No  Tingling: No  Tremor: No  Weakness: No  Difficulty walking: No  Paralysis: No  Numbness: No  Bleeding or spotting between periods: No  Heavy or painful periods: No  Irregular periods: No  Vaginal discharge: No  Hot flashes: Yes  Vaginal dryness: Yes  Genital ulcers: No  Reduced libido: Yes  Painful intercourse: No  Difficulty with sexual arousal: Yes  Post-menopausal bleeding: No  Discharge: No  Lumps: Yes  Pain: Yes  Nipple retraction: No  Nervous or Anxious: Yes  Depression: No  Trouble sleeping: Yes  Trouble thinking or concentrating: No  Mood changes: No  Panic attacks: No    Physical Examination: Vital signs are reviewed and they are stable  Constitutional:  Pleasant, middle aged woman, in no acute physical distress, came alone to her appt  Cardiovascular: negative, RRR  Respiratory: negative findings: normal respiratory rate and rhythm, lungs clear to auscultation  Musculoskeletal: extremities normal- no gross deformities noted, gait normal and normal muscle tone  Skin: no suspicious lesions or rashes  Neurologic: negative  Psychiatric: anxious, crying, worried and mentation appears normal.    BMP RESULTS:  Lab Results   Component Value Date     09/07/2017    POTASSIUM 3.9 09/07/2017    CHLORIDE 104 09/07/2017    CO2 28 09/07/2017    ANIONGAP 7 09/07/2017     (H) 09/07/2017    BUN 13 09/07/2017    CR 0.85 09/07/2017    GFRESTIMATED 69 09/07/2017    GFRESTBLACK 84 09/07/2017    ELMO 8.6 09/07/2017        CBC RESULTS:  Lab Results   Component Value Date    WBC 3.7 (L)  09/07/2017    RBC 4.28 09/07/2017    HGB 10.5 (L) 09/07/2017    HCT 34.0 (L) 09/07/2017    MCV 79 09/07/2017    MCH 24.5 (L) 09/07/2017    MCHC 30.9 (L) 09/07/2017    RDW 15.2 (H) 09/07/2017     09/07/2017       INR/PTT:  Lab Results   Component Value Date    INR 1.00 09/01/2017    PTT 26 09/01/2017       Diagnostic studies:see recent PEt CT scan    PROVIDER NOTE:  I explained the procedure to Elvin.  This included:  Preparing for the procedure, the actual procedure and recovery.  I explained the risk of the bone biopsy:  Bleeding, infection, pain, hitting an unintended organ (blood vessel or nerve).  I explained that usually the results return after four to seven business days.    I explained that he/she would be contacted by Dr. Carrion's  office following this to determine a future plan.  Thank you for involving us in the care of your patient.    45 minutes was spent with Elvin.  35 minutes was spent in counseling.  María Adrian MS, APRN, CNS  Clinical Nurse Specialist  Interventional Radiology  [phone] (voice mail)  [phone] (pager)    CC  Patient Care Team:  Shahla Carrion MD as PCP - General (Hematology)  Shahla Carrion MD as MD (Oncology)  Edu Benitez MD as MD (Palliative)  Dane Delgadillo (Surgery)  Avelina Castro MD as MD (INTERNAL MEDICINE - ENDOCRINOLOGY, DIABETES & METABOLISM)  Harvey England MD as Anesthesiologist (Anesthesiology)  Avinash Jordan MD as MD (Dermapathology)  Josue Stein MD as Resident (Student in organized health care education/training program)  Blaire Araujo (Family Practice)  Radha Gonzalez, RN as Registered Nurse (Palliative)  Seema Chadwick, RN as Nurse Coordinator (Breast Oncology)  SHAHLA CARRION

## 2017-09-11 NOTE — LETTER
9/11/2017       RE: Tisha Arias  [address] [place] [address]     Dear Colleague,    Thank you for referring your patient, Tisha Arias, to the [facility] INTERVENTIONAL RADIOLOGY at [facility]. Please see a copy of my visit note below.    First Name: Tisha   Age: 56 year old   Referring Physician: Dr. Crawley   REASON FOR REFERRAL: Consult for soft tissue mass that invades the right 10th rib and abuts the T10 vertebra.    Assessment:  Elvin is a 57 yo with a hx of infiltrating ductal carcinoma, which his ER/NJ + and HER2 negative.  She is post surgery and is on tamoxifen.  She also has a hx of papillary carcinoma and is post resection and has had radioiodine tx.    She has mast cell activation syndrome.  PET CT scan shows a 3.2 cm soft tissue mass that invades the 10th rib on the right side and abuts T10 and has an SUV of 52.  Biopsy is requested.    Plan:  Image guided biopsy of soft tissue mass that invades the 10th rib and abuts the T10 vertebra.  In addition to pathology send for HER2.  Patient has up to date blood work.    HPI: This is a pt with a history of T1c N0 M0, infiltrating ductal carcinoma of the right breast with a strong family history of breast cancer. BRCA1 and 2 gene testing negative.  She was diagnosed with breast cancer in 12/2011.  She underwent bilateral mastectomy with immediate reconstruction on 01/30/2012 by Dr. Daugherty.  Her final pathology revealed 1.6-cm, infiltrating ductal carcinoma, grade 1/3, no angiolymphatic space invasion, no nipple or skin invasion.  There was associated DCIS, and the margins were free of tumor superiorly, anterior margin by 0.1 cm, and widely free at remaining margins.  This was ER/NJ-positive, HER2-negative in the vast majority of cells and non-amplified with a ratio of 1.1.  She had an Oncotype test performed, which revealed a low-risk score of 11.  That put her overall risk of recurrence at  "about 7% after 5 years of tamoxifen.  She had significant complications postoperatively and eventually in 02/2012, she was started on Arimidex.  Also in 12/2012, she had prophylactic hysterectomy and oophorectomy, the pathology of which was benign.  Her course has been complicated by extreme chest wall pain, myofascial pain, and neuropathic pain.  She has gone through several different clinics and was put on several different medications, which were not doing her any good.  Eventually she was weaned off all the medications and is currently doing only PT with myofascial pain maneuvers with symptomatic improvement.       Incidentally, given that she was having so many symptoms, she underwent a PET scan in 4/2013, which revealed a thyroid nodule which was biopsied and was consistent with papillary carcinoma.  She has undergone resection as well as radioiodine treatment since and has done relatively well from the surgery.  She follows up with Dr. Castro for the same.  She unfortunately required etoh ablation therapy over the thyroid lymph nodes.   She has not had all of the nodes treated; she remains under the care of Dr Avelina Castro.       Due to severe myofascial pain, arthralgia, she was switched to tamoxifen in 2/2014.  She had a diagnosis of mast cell activation syndrome in 2015; she sees Dr Brian Avendaño for this.  September 1 she called into the oncology nurse triage line c/o chest pain and was referred tot he ER.  There she had a CT of the chest and was found to have a right 10th rib mass that abuts the T10 vertebral body.  She says she has c/o of pain in that area for years.  There is an MRI from 2014 which does not show any abnormalities.  She had a PET CT scan performed after the ER visit, the 3.2 cm soft tissue mass has a SUV max of 52.      Elvin is quite discouraged because there are also lymph nodes in her chest that are PET avid.  She is to see pulmonary on Wednesday.  She wants \"it all cut out\".  I " told her that, that might not be feasible.  She wanted to make she the caregivers knew that it takes higher amounts of lidocaine to numb her.       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 No real need for Epipen for this alone.     GDM (gestational diabetes mellitus)     w first pregnancy only     Gestational hypertension      Lymphedema     jackson arms, chest     Migraine      Neuropathy associated with cancer (H)      Papillary carcinoma, follicular variant (H) 2013    pT3, N1, Mx, thyroid     Post-surgical hypothyroidism      Renal disease     kidney stones     Rhinitis, allergic to other allergen      Right Breast mass and microcalcifications 2011     Seasonal allergic conjunctivitis      Seasonal allergic rhinitis 11 skin tests pos. for: dog/M/T/G/W--NEGATIVE FOOD TESTS FOR: shrimp, crab, lobster, coconut     PAST SURGICAL HISTORY:   Past Surgical History:   Procedure Laterality Date     BACK SURGERY  ,    Bulging disc w nerve root impigment     BIOPSY BREAST      right     BIOPSY BREAST  11    right, core and sterotactic     BYPASS GASTRIC, CHOLECYSTECTOMY, COMBINED       C LAPAROSCOPIC GASTRIC RESTRICTIVE PX, W/GASTRIC BYPASS/ GAMALIEL-EN-Y, < 150CM  2007    Gamaliel en Y?      SECTION       COLONOSCOPY      normal     COSMETIC SURGERY  2012    Final Stage of Mastectomy     DAVINCI HYSTERECTOMY TOTAL, BILATERAL SALPINGO-OOPHORECTOMY, COMBINED  2012    Procedure: COMBINED DAVINCI HYSTERECTOMY TOTAL, SALPINGO-OOPHORECTOMY;  Davinci Total Laparoscopic Hysterectomy, Bilateral Salpingo Oophorectomy, Pelvic Washings,  Cystoscopy;  Surgeon: Evie Sheikh MD;  Location: [facility] OR     ENT SURGERY  1982     GI SURGERY  11-    Rachael-en Y w cholecystectomy     GYN SURGERY  1/6/89,1/10/92    2 c-sections     HYSTERECTOMY, PAP NO LONGER INDICATED  12/12    DeVinci assister lap hyst with BSO     IRRIGATION AND DEBRIDEMENT BREAST  3/1/2012    Procedure:IRRIGATION AND DEBRIDEMENT BREAST; Irrigation and Debridement, Wound Closure Right Breast; Surgeon:JUNG GUILLEN; Location:[facility] OR     MASTECTOMY, RECONSTRUCT BREAST, COMBINED  1/30/2012    Procedure:COMBINED MASTECTOMY, RECONSTRUCT BREAST; Bilateral Mastectomies, Right Axillary Raymond Node Biopsy, Bilateral Breast Reconstruction with Tissue Expanders, Reconstruction of inframammary fold, bilateral pain management systems; Surgeon:ANUPAM CAMPBELL; Location:[facility] OR     RECONSTRUCT BREAST  8/31/2012    Procedure: RECONSTRUCT BREAST;  Bilateral 2nd stage breast reconstruction, revision,       SOFT TISSUE SURGERY  1-30-12    Mastectomy w severe myofascial pain syndrome     THYROIDECTOMY  7/10/2013    Procedure: THYROIDECTOMY;  Total Thyroidectomy with central neck dissection;  Surgeon: Indiana Sneed MD;  Location: [facility] OR     TONSILLECTOMY      childhood     FAMILY HISTORY:   Family History   Problem Relation Age of Onset     Allergies Mother      Arthritis Mother      CANCER Mother      uterine/uterine     Hypertension Mother      on HCTZ     Hyperlipidemia Mother      CEREBROVASCULAR DISEASE Mother      s/p brain surgery     Breast Cancer Mother      Depression Mother      Anxiety Disorder Mother      Anesthesia Reaction Mother      Asthma Mother      Skin Cancer Mother      HEART DISEASE Father      DIABETES Father      Coronary Artery Disease Father      Hypertension Father      Hyperlipidemia Father      CEREBROVASCULAR DISEASE Father      Multiple strokes     Obesity Father      DIABETES Brother      Depression Brother      Hypertension Brother      CANCER Maternal  Grandfather      pancreatic cancer/stomach cancer     Prostate Cancer Maternal Grandfather      Prostrate and Bladder     Other Cancer Maternal Grandfather      Pancreatic 1988, Bladder 1977     CANCER Maternal Grandmother      uterine     Breast Cancer Maternal Grandmother      Skin Cancer Maternal Grandmother      CANCER Brother 57     pancreatic cancer     DIABETES Brother      Breast Cancer Cousin      Grand mothers sister     Other Cancer Brother      Stage 3 Pancreatic 2-2015     Depression Brother      Asthma Brother      Obesity Brother      Anesthesia Reaction Other      H/a, itching, drug interactions     Asthma Other      CANCER Brother      Pancreatic      Thyroid Disease No family hx of      SOCIAL HISTORY:   Social History   Substance Use Topics     Smoking status: Never Smoker     Smokeless tobacco: Never Used      Comment: no 2nd hand smoke exposure     Alcohol use Yes      Comment: rare     PROBLEM LIST:   Patient Active Problem List    Diagnosis Date Noted     Personal history of malignant neoplasm of breast 01/23/2017     Priority: Medium     Status post bariatric surgery 11/02/2016     Priority: Medium     Hypocalcemia 11/02/2016     Priority: Medium     Migraine without status migrainosus, not intractable, unspecified migraine type 10/03/2016     Priority: Medium     Rash 08/07/2016     Priority: Medium     Intertrigo 08/03/2016     Priority: Medium     Chest wall pain 04/20/2016     Priority: Medium     Mild intermittent asthma without complication 12/21/2015     Priority: Medium     Idiopathic mast cell activation syndrome (H) 11/03/2015     Priority: Medium     Vitamin D deficiency 08/21/2015     Priority: Medium     Chronic pain, post bilateral mastectomies and breast reconstruction  08/21/2015     Priority: Medium     Patient is followed by Palliative care for ongoing prescription of pain medication.  All refills should be approved by this provider, or covering partner.    Medication(s):  morphine BID  Maximum quantity per month: Per palliative care   Clinic visit frequency required:       Controlled substance agreement on file: No    Pain Clinic evaluation in the past: Yes       Location(s):  [facility] Total Score(s):  No flowsheet data found.    Last [facility] website verification:  none   [email or web address]         Metastasis to cervical lymph node (H) 09/26/2014     Priority: Medium     S/P breast reconstruction 05/28/2014     Priority: Medium     S/P thyroidectomy 07/10/2013     Priority: Medium     Postsurgical hypoparathyroidism (H) 07/10/2013     Priority: Medium     Postsurgical hypothyroidism 07/10/2013     Priority: Medium     Papillary carcinoma, follicular variant (H) 06/28/2013     Priority: Medium     Medication side effects 06/28/2013     Priority: Medium     Baclofen and topamax       Neuropathic pain of chest 05/30/2013     Priority: Medium     Atrophic vaginitis 05/06/2013     Priority: Medium     Lymphedema of breast 03/06/2013     Priority: Medium     Family history of uterine cancer 07/26/2012     Priority: Medium     Infection 03/06/2012     Priority: Medium     Cellulitis 03/06/2012     Priority: Medium     Breast cancer (H) 02/09/2012     Priority: Medium     DCIS, bilateral mastectomies, 1/2012       DCIS (ductal carcinoma in situ) of right breast 12/29/2011     Priority: Medium     CARDIOVASCULAR SCREENING; LDL GOAL LESS THAN 160 06/09/2011     Priority: Medium     Seasonal allergic rhinitis      Priority: Medium     Allergic rhinitis due to animal dander      Priority: Medium     Food intolerance      Priority: Medium     MEDICATIONS:   Prescription Medications as of 9/11/2017             oxyCODONE (ROXICODONE) 10 MG IR tablet Take 1.5-2 tablets (15-20 mg) by mouth every 4 hours as needed    morphine (MS CONTIN) 30 MG 12 hr tablet Starting on 9/13/2017. Take 1 tablet (30 mg) by mouth every 8 hours    diazepam (VALIUM) 5 MG tablet Take 1 tablet (5 mg) by mouth  3 times daily as needed For muscle spasms    levothyroxine (SYNTHROID/LEVOTHROID) 112 MCG tablet Mon-Sat: (2 tabs daily) Sunday: 3 tabs    dexamethasone (DECADRON) 4 MG tablet Take 1 tablet (4 mg) by mouth 2 times daily (with meals)    hydrochlorothiazide (MICROZIDE) 12.5 MG capsule Take 1 capsule (12.5 mg) by mouth daily    SUMAtriptan (IMITREX) 50 MG tablet Take 1 tablet (50 mg) by mouth at onset of headache for migraine (may repeat in 2 hours as needed, max 2 tablets daily)    COLCRYS 0.6 MG tablet Take 1.6 mg by mouth 3 times daily    celecoxib (CELEBREX) 200 MG capsule Take 1 capsule (200 mg) by mouth 2 times daily    montelukast (SINGULAIR) 10 MG tablet TAKE TWO TABLETS BY MOUTH TWICE DAILY    chlorhexidine (PERIDEX) 0.12 % solution     HYDROcodone-acetaminophen (NORCO) 5-325 MG per tablet TK 1 T PO Q 4 TO 6 H PRN P    Colchicine 0.6 MG CAPS Take 0.6 mg by mouth 3 times daily for costochondritis (chest) discomfort. Scale back use to prn with loose stools or bloating.    EPINEPHrine (EPIPEN 2-CARIDAD) 0.3 MG/0.3ML injection Inject 0.3 mLs (0.3 mg) into the muscle once as needed for anaphylaxis    methocarbamol (ROBAXIN) 750 MG tablet Take 1 tablet (750 mg) by mouth 4 times daily as needed . Ok to take a 5th Robaxin on very severe days.    potassium chloride SA (POTASSIUM CHLORIDE) 20 MEQ CR tablet Take 1 tablet (20 mEq) by mouth daily    VENTOLIN  (90 BASE) MCG/ACT Inhaler INHALE 2 PUFFS INTO THE LUNGS EVERY 4 HOURS AS NEEDED FOR SHORTNESS OF BREATH OR DIFFICULT BREATHING OR WHEEZING    cromolyn (OPTICROM) 4 % ophthalmic solution Place 1 drop into both eyes 4 times daily    NASALCROM 5.2 MG/ACT AERS Inhaler SPRAY ONE SPRAY( 1 ML) IN NOSTRIL DAILY    Cholecalciferol (VITAMIN D3) 2000 UNITS CAPS Take 10,000 Units by mouth daily    ondansetron (ZOFRAN ODT) 4 MG ODT tab Take 1-2 tablets (4-8 mg) by mouth every 8 hours as needed for nausea or vomiting    COMPOUND (CMPD RX) - PHARMACY TO MIX COMPOUNDED MEDICATION  Apply small amount to affected area two times daily.Ketamine 6% + ketoprofen 10% + lidocaine 10% in Lipo.    lidocaine (XYLOCAINE) 5 % ointment SANJANA TOPICALLY QID PRN    order for DME Equipment being ordered: compression stockings. 20-30 mm or 30 - 40 mm as patient can tolerate. Physical therapy to determine if they should be above or below the knee.    order for DME Equipment being ordered: compression bra    ranitidine (ZANTAC) 75 MG tablet Take 1 tablet (75 mg) by mouth 3 times daily    tamoxifen (NOLVADEX) 20 MG tablet Take 1 tablet (20 mg) by mouth daily    Lidocaine HCl Monohydrate POWD     calcitRIOL (ROCALTROL) 0.25 MCG capsule 2 tabs in am and 1 tab qhs    aluminum chloride (DRYSOL) 20 % external solution Apply topically At Bedtime    tacrolimus (PROTOPIC) 0.1 % ointment Apply topically 2 times daily    triamcinolone (KENALOG) 0.025 % ointment Apply topically 2 times daily Mix with 1 lb jar of vaseline or aquaphor    UNABLE TO FIND MEDICATION NAME: Tumeric 3 capsules daily    diclofenac (VOLTAREN) 1 % GEL Apply affected area two times daily PRN using enclosed dosing card.    mometasone (ASMANEX 30 METERED DOSES) 110 MCG/INH inhaler Inhale 1 puff into the lungs daily    UNABLE TO FIND 2 tablets 3 times daily MEDICATION NAME: Natural D-Hist    MAGNESIUM CITRATE PO Take 400 mg by mouth daily    DiphenhydrAMINE HCl (BENADRYL PO) Take 50 mg by mouth as needed    calcium citrate-vitamin D (CALCIUM CITRATE +D) 315-250 MG-UNIT TABS Take 2 tablets by mouth 3 times daily    calcium carbonate (TUMS) 500 MG chewable tablet Take 2 tablets (1,000 mg) by mouth nightly as needed for other (cramps)    UNABLE TO FIND 1 tablet 3 times daily MEDICATION NAME: calcium D-Glucarate    Digestive Enzymes (BETAINE HCL PO) Take 2 tablets by mouth as needed Betaine HCL and Pepsin: Take 1-7 tabs by mouth daily, if burning take one tablet less once daily.  Betaine HCL 1.04 g  Pepsin 40 mg    Triamcinolone Acetonide (AZMACORT IN) Inhale 2  puffs into the lungs as needed    UNABLE TO FIND 1 tablet 3 times daily MEDICATION NAME: Digestzymes    UNABLE TO FIND 1 tablet daily MEDICATION NAME: Pure Encapsulations    cyclobenzaprine (FLEXERIL) 10 MG tablet Take 10 mg by mouth 3 times daily as needed On hold Dr Monsalve    Probiotic Product (PROBIOTIC DAILY PO) Take 1 capsule by mouth daily Lacto acid bifidobacterium    polyethylene glycol (MIRALAX) powder Take 17 g by mouth daily    docusate sodium 100 MG tablet Take 100 mg by mouth daily    cetirizine (ZYRTEC ALLERGY) 10 MG tablet Take 1 tablet (10 mg) by mouth 3 times daily On hold for lab test.    Omega-3 Fatty Acids (OMEGA 3 PO) Take 5 mLs by mouth    ACAI PO Take 1,000 mg by mouth 2 times daily        ALLERGIES: Baclofen; No clinical screening - see comments; Serotonin; Amitriptyline hcl; Birch trees; Blue dyes; Cymbalta; Gabapentin; Grass; Mugwort [artemisia vulgaris]; Ragweeds; Topamax; and Nortriptyline  VITALS: /87  Pulse 87  Wt 87.5 kg (193 lb)  SpO2 98%  BMI 32.12 kg/m2    ROS:  Answers for HPI/ROS submitted by the patient on 9/11/2017   General Symptoms: Yes  Skin Symptoms: Yes  HENT Symptoms: Yes  EYE SYMPTOMS: Yes  HEART SYMPTOMS: Yes  LUNG SYMPTOMS: Yes  INTESTINAL SYMPTOMS: Yes  URINARY SYMPTOMS: Yes  GYNECOLOGIC SYMPTOMS: Yes  BREAST SYMPTOMS: Yes  SKELETAL SYMPTOMS: Yes  BLOOD SYMPTOMS: Yes  NERVOUS SYSTEM SYMPTOMS: Yes  MENTAL HEALTH SYMPTOMS: Yes  Fever: No  Loss of appetite: Yes  Weight loss: No  Weight gain: No  Fatigue: Yes  Night sweats: Yes  Chills: No  Increased stress: Yes  Excessive hunger: No  Excessive thirst: No  Feeling hot or cold when others believe the temperature is normal: Yes  Loss of height: No  Post-operative complications: Yes  Surgical site pain: Yes  Hallucinations: No  Change in or Loss of Energy: Yes  Hyperactivity: No  Confusion: No  Changes in hair: Yes  Changes in moles/birth marks: No  Itching: Yes  Rashes: No  Changes in nails: Yes  Acne: No  Hair in  places you don't want it: No  Change in facial hair: No  Warts: No  Non-healing sores: No  Scarring: No  Flaking of skin: Yes  Color changes of hands/feet in cold : No  Sun sensitivity: No  Skin thickening: No  Ear pain: No  Ear discharge: No  Hearing loss: No  Tinnitus: No  Nosebleeds: No  Congestion: No  Sinus pain: No  Trouble swallowing: No   Voice hoarseness: No  Mouth sores: No  Sore throat: No  Tooth pain: No  Gum tenderness: No  Bleeding gums: Yes  Change in taste: Yes  Change in sense of smell: No  Dry mouth: Yes  Hearing aid used: No  Neck lump: No  Eye pain: No  Vision loss: No  Dry eyes: Yes  Watery eyes: No  Eye bulging: No  Double vision: No  Flashing of lights: No  Spots: No  Floaters: No  Redness: Yes  Crossed eyes: No  Tunnel Vision: No  Yellowing of eyes: No  Eye irritation: Yes  Cough: No  Sputum or phlegm: No  Coughing up blood: No  Difficulty breating or shortness of breath: Yes  Snoring: No  Wheezing: Yes  Difficulty breathing on exertion: Yes  Respiratory pain: Yes  Nighttime Cough: No  Difficulty breathing when lying flat: Yes  Chest pain or pressure: Yes  Fast or irregular heartbeat: No  Pain in legs with walking: No  Swelling in feet or ankles: Yes  Trouble breathing while lying down: Yes  Fingers or Toes appear blue: No  High blood pressure: No  Low blood pressure: No  Fainting: No  Murmurs: No  Chest pain on exertion: Yes  Chest pain at rest: Yes  Cramping pain in leg during exercise: Yes  Pacemaker: No  Varicose veins: No  Edema or swelling: Yes  Fast heart beat: No  Wake up at night with shortness of breath: Yes  Heart flutters: No  Light-headedness: Yes  Exercise intolerance: Yes  Heart burn or indigestion: No  Nausea: Yes  Vomiting: Yes  Abdominal pain: Yes  Bloating: No  Constipation: Yes  Diarrhea: No  Blood in stool: No  Black stools: No  Rectal or Anal pain: No  Fecal incontinence: No  Rectal bleeding: No  Yellowing of skin or eyes: No  Vomit with blood: No  Change in stools:  No  Hemorrhoids: No  Trouble holding urine or incontinence: Yes  Pain or burning: No  Trouble starting or stopping: Yes  Increased frequency of urination: No  Blood in urine: No  Decreased frequency of urination: No  Frequent nighttime urination: Yes  Flank pain: Yes  Difficulty emptying bladder: Yes  Back pain: Yes  Muscle aches: Yes  Neck pain: Yes  Swollen joints: No  Joint pain: Yes  Bone pain: Yes  Muscle cramps: Yes  Muscle weakness: No  Joint stiffness: Yes  Bone fracture: No  Anemia: Yes  Swollen glands: Yes  Easy bleeding or bruising: Yes  Trouble with coordination: No  Dizziness or trouble with balance: No  Fainting or black-out spells: No  Memory loss: No  Headache: Yes  Seizures: No  Speech problems: No  Tingling: No  Tremor: No  Weakness: No  Difficulty walking: No  Paralysis: No  Numbness: No  Bleeding or spotting between periods: No  Heavy or painful periods: No  Irregular periods: No  Vaginal discharge: No  Hot flashes: Yes  Vaginal dryness: Yes  Genital ulcers: No  Reduced libido: Yes  Painful intercourse: No  Difficulty with sexual arousal: Yes  Post-menopausal bleeding: No  Discharge: No  Lumps: Yes  Pain: Yes  Nipple retraction: No  Nervous or Anxious: Yes  Depression: No  Trouble sleeping: Yes  Trouble thinking or concentrating: No  Mood changes: No  Panic attacks: No    Physical Examination: Vital signs are reviewed and they are stable  Constitutional:  Pleasant, middle aged woman, in no acute physical distress, came alone to her appt  Cardiovascular: negative, RRR  Respiratory: negative findings: normal respiratory rate and rhythm, lungs clear to auscultation  Musculoskeletal: extremities normal- no gross deformities noted, gait normal and normal muscle tone  Skin: no suspicious lesions or rashes  Neurologic: negative  Psychiatric: anxious, crying, worried and mentation appears normal.    BMP RESULTS:  Lab Results   Component Value Date     09/07/2017    POTASSIUM 3.9 09/07/2017     CHLORIDE 104 09/07/2017    CO2 28 09/07/2017    ANIONGAP 7 09/07/2017     (H) 09/07/2017    BUN 13 09/07/2017    CR 0.85 09/07/2017    GFRESTIMATED 69 09/07/2017    GFRESTBLACK 84 09/07/2017    ELMO 8.6 09/07/2017        CBC RESULTS:  Lab Results   Component Value Date    WBC 3.7 (L) 09/07/2017    RBC 4.28 09/07/2017    HGB 10.5 (L) 09/07/2017    HCT 34.0 (L) 09/07/2017    MCV 79 09/07/2017    MCH 24.5 (L) 09/07/2017    MCHC 30.9 (L) 09/07/2017    RDW 15.2 (H) 09/07/2017     09/07/2017       INR/PTT:  Lab Results   Component Value Date    INR 1.00 09/01/2017    PTT 26 09/01/2017       Diagnostic studies:see recent PEt CT scan    PROVIDER NOTE:  I explained the procedure to Elvin.  This included:  Preparing for the procedure, the actual procedure and recovery.  I explained the risk of the bone biopsy:  Bleeding, infection, pain, hitting an unintended organ (blood vessel or nerve).  I explained that usually the results return after four to seven business days.    I explained that he/she would be contacted by Dr. Crawley's  office following this to determine a future plan.  Thank you for involving us in the care of your patient.    45 minutes was spent with Elvin.  35 minutes was spent in counseling.  María Adrian MS, APRN, CNS  Clinical Nurse Specialist  Interventional Radiology  [phone] (voice mail)  [phone] (pager)    CC  Patient Care Team:  Shahla Crawley MD as PCP - General (Hematology)  Edu Benitez MD as MD (Palliative)  Dane Delgadillo (Surgery)  Avelina Castro MD as MD (INTERNAL MEDICINE - ENDOCRINOLOGY, DIABETES & METABOLISM)  Harvey England MD as Anesthesiologist (Anesthesiology)  Avinash Jordan MD as MD (Dermapathology)  Josue Stein MD as Resident (Student in organized health care education/training program)  Blaire Araujo (Family Practice)  Radha Gonzalez, RN as Registered Nurse (Palliative)  Seema Chadwick RN as Nurse Coordinator (Breast  Oncology)  STEFAN CARRION

## 2017-09-11 NOTE — PATIENT INSTRUCTIONS
You are scheduled for your biopsy on Friday, September 15, 2017   Report to the [facility] Waiting room at 8:00 AM  The [facility] Waiting Room is located on the 2nd floor (street level) of the [facility], [address] [place] [address].  Your procedure is scheduled to start at approximately 9:30 AM    No solid foods or milk products for 6 hours prior on the day of the procedure.3:30 AM  You may have clear liquids until 2 hours prior on the day of the procedure.(water, apple juice, broth, coffee or jimmie without milk or sugar, jell-o, white grape juice) 7:30 AM    You will need a     If you have any questions you may call the Radiology Nurse Line [phone]

## 2017-09-12 ENCOUNTER — TELEPHONE (OUTPATIENT)
Dept: ONCOLOGY | Facility: CLINIC | Age: 57
End: 2017-09-12

## 2017-09-12 ENCOUNTER — CARE COORDINATION (OUTPATIENT)
Dept: ONCOLOGY | Facility: CLINIC | Age: 57
End: 2017-09-12

## 2017-09-12 ENCOUNTER — TELEPHONE (OUTPATIENT)
Dept: PALLIATIVE CARE | Facility: CLINIC | Age: 57
End: 2017-09-12

## 2017-09-12 DIAGNOSIS — R59.0 HILAR ADENOPATHY: ICD-10-CM

## 2017-09-12 DIAGNOSIS — C79.51 SPINE METASTASIS: ICD-10-CM

## 2017-09-12 RX ORDER — DEXAMETHASONE 4 MG/1
4 TABLET ORAL 2 TIMES DAILY WITH MEALS
Qty: 10 TABLET | Refills: 0 | Status: SHIPPED | OUTPATIENT
Start: 2017-09-12 | End: 2017-09-22

## 2017-09-12 NOTE — PROGRESS NOTES
Returned patient's call requesting to continue decadron for back pain as has completed the decadron as Dr Crawley recommended. Will send a message to Dr Crawley if wants to continue decadron and has seen Palliative care and is following their recommendations for pain control. Left message on patient's work phone will return call when Dr Crawley responds to message sent for more decadron. Seema Chadwick RN, BSN  Dr Crawley recommended refilling decadron as currently prescribed. Spoke to patient and requested prescription to be sent to the [facility] in [place]. Prescription sent as requested.  Answered all patient's questions and verbalized understanding. Seema Chadwick RN, BSN.

## 2017-09-12 NOTE — TELEPHONE ENCOUNTER
Date of Contact: 9/12/17 - per oncology distress screen tool - pt request to speak with RD  Type of contact: Phone  Contact with : left message x 2 (9/8 & 9/12).  Follow-up action:  Advised pt to return call to RD if she desires to speak with RD via phone or face-to face.  Contact number provided.

## 2017-09-12 NOTE — TELEPHONE ENCOUNTER
Received call from patient with request for call back to answer a few questions.    1. Patient asked about continuing steroids -- this was directed to ONC team, who answered and refilled steroid.     2. Patient concerned about miscommunication following discussion last we re: how much diazepam she took prior to scan - she clarifies she took 3, 5mg tabs (total 15mg). She states this was discussed with Dr. Benitez at her appt.     3. Patient reports that she is having trouble sleeping due to pain. She reports she is taking oxycodone 1 tab and if no relief she will take another tab in 2 hours. She states she wakes up 1-2 times a night in pain and will take oxycodone at that time.

## 2017-09-13 ENCOUNTER — CARE COORDINATION (OUTPATIENT)
Dept: GASTROENTEROLOGY | Facility: CLINIC | Age: 57
End: 2017-09-13

## 2017-09-13 ENCOUNTER — OFFICE VISIT (OUTPATIENT)
Dept: PULMONOLOGY | Facility: CLINIC | Age: 57
End: 2017-09-13
Attending: INTERNAL MEDICINE
Payer: COMMERCIAL

## 2017-09-13 ENCOUNTER — TELEPHONE (OUTPATIENT)
Dept: GASTROENTEROLOGY | Facility: CLINIC | Age: 57
End: 2017-09-13

## 2017-09-13 ENCOUNTER — ANESTHESIA EVENT (OUTPATIENT)
Dept: SURGERY | Facility: CLINIC | Age: 57
End: 2017-09-13
Payer: COMMERCIAL

## 2017-09-13 VITALS
RESPIRATION RATE: 18 BRPM | SYSTOLIC BLOOD PRESSURE: 151 MMHG | HEART RATE: 92 BPM | OXYGEN SATURATION: 99 % | TEMPERATURE: 97 F | DIASTOLIC BLOOD PRESSURE: 78 MMHG

## 2017-09-13 DIAGNOSIS — C79.51 SPINE METASTASIS: ICD-10-CM

## 2017-09-13 DIAGNOSIS — C50.919 BREAST CANCER (H): Primary | ICD-10-CM

## 2017-09-13 DIAGNOSIS — C50.911 MALIGNANT NEOPLASM OF RIGHT BREAST (H): ICD-10-CM

## 2017-09-13 DIAGNOSIS — R59.0 HILAR ADENOPATHY: ICD-10-CM

## 2017-09-13 PROCEDURE — 99212 OFFICE O/P EST SF 10 MIN: CPT | Mod: ZF

## 2017-09-13 RX ORDER — MORPHINE SULFATE 15 MG/1
30 TABLET, FILM COATED, EXTENDED RELEASE ORAL AT BEDTIME
COMMUNITY
Start: 2017-08-18 | End: 2017-09-20

## 2017-09-13 ASSESSMENT — PAIN SCALES - GENERAL: PAINLEVEL: EXTREME PAIN (8)

## 2017-09-13 NOTE — PROGRESS NOTES
Care Coordination Telephone Call  GI Service and Surgical Oncology    Called patient to discuss plan of care for EUS.  She was very upset that this was not done on Friday but due to sedation and OR time was not able to be done that way. I have confirmed EUS time of arrival and prep.    I have asked the patient to call with any additional questions or concerns and have provided my contact information.    Plan:  EUS tomorrow    Anne PERKINSN, HNBC, STAR-T  RN Care Coordinator  Surgical Oncology and GI service  Ph: [phone]  FAX: [phone]

## 2017-09-13 NOTE — MR AVS SNAPSHOT
After Visit Summary   9/13/2017    Tisha Arias    MRN: [id number]           Patient Information     Date Of Birth          1960        Visit Information        Provider Department      9/13/2017 8:20 AM Brady Dobbs MD [facility]        Today's Diagnoses     Malignant neoplasm of right breast (H)        Spine metastasis (H)        Hilar adenopathy           Follow-ups after your visit        Your next 10 appointments already scheduled     Oct 04, 2017  7:00 AM CDT   (Arrive by 6:45 AM)   Return Visit with Edu Benitez MD   [facility] ([facility])    [address] [place] [id number] [phone]            Nov 02, 2017  5:00 PM CDT   US HEAD NECK SOFT TISSUE with UCUS3   [facility] ([facility])    [address] [place] [id number] [phone]           Please bring a list of your medicines (including vitamins, minerals and over-the-counter drugs). Also, tell your doctor about any allergies you may have. Wear comfortable clothes and leave your valuables at home.  You do not need to do anything special to prepare for your exam.  Please call the Imaging Department at your exam site with any questions.            Nov 02, 2017  6:00 PM CDT   LAB with  LAB   [facility] ([facility])    [address] [place] [id number] [phone]           Patient must bring picture ID. Patient should be prepared to give a urine specimen  Please do not eat 10-12 hours before your appointment if you are coming in fasting for labs on lipids, cholesterol, or glucose (sugar). Pregnant women should follow their Care Team instructions. Water with medications is okay. Do not drink coffee or other fluids. If you have concerns about taking  your medications, please ask at office or if  scheduling via Trunk Archive, send a message by clicking on Secure Messaging, Message Your Care Team.            Nov 13, 2017  4:30 PM CST   (Arrive by 4:15 PM)   RETURN ENDOCRINE with Avelina Castro MD   [facility] ([facility])    [address] [place] [address]  3rd [facility] [id number] [phone]            Nov 27, 2017  4:00 PM CST   (Arrive by 3:45 PM)   Return Visit with Shahla Crawley MD   [facility] ([facility])    [address] [place] [id number] [phone]              Who to contact     If you have questions or need follow up information about today's clinic visit or your schedule please contact [facility] directly at [phone].  Normal or non-critical lab and imaging results will be communicated to you by Dibsiehart, letter or phone within 4 business days after the clinic has received the results. If you do not hear from us within 7 days, please contact the clinic through Dibsiehart or phone. If you have a critical or abnormal lab result, we will notify you by phone as soon as possible.  Submit refill requests through Trunk Archive or call your pharmacy and they will forward the refill request to us. Please allow 3 business days for your refill to be completed.          Additional Information About Your Visit        Dibsiehart Information     Trunk Archive gives you secure access to your electronic health record. If you see a primary care provider, you can also send messages to your care team and make appointments. If you have questions, please call your primary care clinic.  If you do not have a primary care provider, please call [phone] and they will assist you.        Care EveryWhere ID     This is your Care EveryWhere ID. This could be used by other organizations to access your [facility] medical records  [id number]        Your Vitals Were     Pulse Temperature Respirations  Pulse Oximetry          92 97  F (36.1  C) (Oral) 18 99%         Blood Pressure from Last 3 Encounters:   09/15/17 116/66   09/15/17 142/86   09/14/17 115/71    Weight from Last 3 Encounters:   09/15/17 87.9 kg (193 lb 12.6 oz)   09/14/17 87.9 kg (193 lb 12.6 oz)   09/11/17 87.5 kg (193 lb)              Today, you had the following     No orders found for display       Primary Care Provider Office Phone # Fax #    Shahla Crawley [phone]       [address] [facility] [address]  [place] [address]        Equal Access to Services     RODRIGO ZAMORA : Kevin Venegas, girish ge, timmy khanalmada daina, ranjan martin. So [facility] [phone].    ATENCIÓN: Si habla español, tiene a stiles disposición servicios gratuitos de asistencia lingüística. Llame al [phone].    We comply with applicable federal civil rights laws and [place] laws. We do not discriminate on the basis of race, color, national origin, age, disability sex, sexual orientation or gender identity.            Thank you!     Thank you for choosing [facility]  for your care. Our goal is always to provide you with excellent care. Hearing back from our patients is one way we can continue to improve our services. Please take a few minutes to complete the written survey that you may receive in the mail after your visit with us. Thank you!             Your Updated Medication List - Protect others around you: Learn how to safely use, store and throw away your medicines at www.disposemymeds.org.          This list is accurate as of: 9/13/17 11:59 PM.  Always use your most recent med list.                   Brand Name Dispense Instructions for use Diagnosis    ACAI PO      Take 1,000 mg by mouth 2 times daily    Breast cancer, unspecified laterality, Thyroid cancer (H), Chronic arthralgias of knees and hips, unspecified laterality       aluminum chloride 20 % external solution     DRYSOL    60 mL    Apply topically At Bedtime    Rash, Intertrigo       AZMACORT IN      Inhale 2 puffs into the lungs as needed        BENADRYL PO      Take 50 mg by mouth as needed        BETAINE HCL PO      Take 2 tablets by mouth as needed Betaine HCL and Pepsin: Take 1-7 tabs by mouth daily, if burning take one tablet less once daily. Betaine HCL 1.04 g Pepsin 40 mg        calcitRIOL 0.25 MCG capsule    ROCALTROL    270 capsule    2 tabs in am and 1 tab qhs    Postsurgical hypothyroidism, Papillary carcinoma, follicular variant (H), Metastasis to cervical lymph node (H)       CALCIUM CITRATE +D 315-250 MG-UNIT Tabs per tablet   Generic drug:  calcium citrate-vitamin D     120 tablet    Take 2 tablets by mouth 3 times daily        celecoxib 200 MG capsule    celeBREX    180 capsule    Take 1 capsule (200 mg) by mouth 2 times daily    Chest wall pain       chlorhexidine 0.12 % solution    PERIDEX          * Colchicine 0.6 MG Caps     270 capsule    Take 0.6 mg by mouth 3 times daily for costochondritis (chest) discomfort. Scale back use to prn with loose stools or bloating.    Costal chondritis       * COLCRYS 0.6 MG tablet   Generic drug:  colchicine      Take 1.6 mg by mouth 3 times daily        COMPOUNDED NON-CONTROLLED SUBSTANCE - PHARMACY TO MIX COMPOUNDED MEDICATION    CMPD RX    120 g    Apply small amount to affected area two times daily.Ketamine 6% + ketoprofen 10% + lidocaine 10% in Lipo.    Chest wall pain       cromolyn 4 % ophthalmic solution    OPTICROM    10 mL    Place 1 drop into both eyes 4 times daily    Idiopathic mast cell activation syndrome (H)       cyclobenzaprine 10 MG tablet    FLEXERIL     Take 10 mg by mouth 3 times daily as needed On hold Dr Monsalve        dexamethasone 4 MG tablet    DECADRON    10 tablet    Take 1 tablet (4 mg) by mouth 2 times daily (with meals)    Spine metastasis (H), Hilar adenopathy       diazepam 5 MG tablet    VALIUM    90 tablet    Take 1 tablet (5 mg) by  mouth 3 times daily as needed For muscle spasms    Chest wall pain       diclofenac 1 % Gel topical gel    VOLTAREN    100 g    Apply affected area two times daily PRN using enclosed dosing card.    Myofascial pain       docusate sodium 100 MG tablet    COLACE    60 tablet    Take 100 mg by mouth daily    Acute constipation       EPINEPHrine 0.3 MG/0.3ML injection 2-pack    EPIPEN 2-CARIDAD    0.6 mL    Inject 0.3 mLs (0.3 mg) into the muscle once as needed for anaphylaxis        hydrochlorothiazide 12.5 MG capsule    MICROZIDE    90 capsule    Take 1 capsule (12.5 mg) by mouth daily    Hypocalcemia       HYDROcodone-acetaminophen 5-325 MG per tablet    NORCO     TK 1 T PO Q 4 TO 6 H PRN P        levothyroxine 112 MCG tablet    SYNTHROID/LEVOTHROID    189 tablet    Mon-Sat: (2 tabs daily) Sunday: 3 tabs    Postsurgical hypothyroidism, Papillary carcinoma, follicular variant (H), Postsurgical hypoparathyroidism (H), Thyroid cancer (H)       lidocaine 5 % ointment    XYLOCAINE    35.44 g    SANJANA TOPICALLY QID PRN    Chest wall pain       Lidocaine HCl Monohydrate Powd           MAGNESIUM CITRATE PO      Take 400 mg by mouth daily        methocarbamol 750 MG tablet    ROBAXIN    360 tablet    Take 1 tablet (750 mg) by mouth 4 times daily as needed . Ok to take a 5th Robaxin on very severe days.    Myofascial pain       mometasone 110 MCG/INH inhaler    ASMANEX 30 METERED DOSES    3 Inhaler    Inhale 1 puff into the lungs daily    Intermittent asthma, uncomplicated       montelukast 10 MG tablet    SINGULAIR    120 tablet    TAKE TWO TABLETS BY MOUTH TWICE DAILY    Idiopathic mast cell activation syndrome (H)       * morphine 15 MG 12 hr tablet    MS CONTIN     30 mg At Bedtime        * morphine 30 MG 12 hr tablet    MS CONTIN    90 tablet    Take 1 tablet (30 mg) by mouth every 8 hours    Chest wall pain       NASALCROM 5.2 MG/ACT Aers Inhaler   Generic drug:  cromolyn sodium     1 Bottle    SPRAY ONE SPRAY( 1 ML) IN  NOSTRIL DAILY    Mast cell disease, systemic (H)       OMEGA 3 PO      Take 5 mLs by mouth        ondansetron 4 MG ODT tab    ZOFRAN ODT    60 tablet    Take 1-2 tablets (4-8 mg) by mouth every 8 hours as needed for nausea or vomiting    Nausea with vomiting       * order for DME     2 Units    Equipment being ordered: compression stockings. 20-30 mm or 30 - 40 mm as patient can tolerate. Physical therapy to determine if they should be above or below the knee.    Venous stasis       * order for DME     2 Device    Equipment being ordered: compression bra    Malignant neoplasm of right female breast, unspecified site of breast       oxyCODONE 10 MG IR tablet    ROXICODONE    180 tablet    Take 1.5-2 tablets (15-20 mg) by mouth every 4 hours as needed    Neoplasm related pain       polyethylene glycol powder    MIRALAX    510 g    Take 17 g by mouth daily    Acute constipation       potassium chloride SA 20 MEQ CR tablet    potassium chloride    90 tablet    Take 1 tablet (20 mEq) by mouth daily    Hypokalemia       PROBIOTIC DAILY PO      Take 1 capsule by mouth daily Lacto acid bifidobacterium    Breast cancer, unspecified laterality, Thyroid cancer (H), Chronic arthralgias of knees and hips, unspecified laterality       ranitidine 75 MG tablet    ZANTAC    270 tablet    Take 1 tablet (75 mg) by mouth 3 times daily    Mast cell disease, systemic (H)       SUMAtriptan 50 MG tablet    IMITREX    5 tablet    Take 1 tablet (50 mg) by mouth at onset of headache for migraine (may repeat in 2 hours as needed, max 2 tablets daily)    Migraine without status migrainosus, not intractable, unspecified migraine type       tacrolimus 0.1 % ointment    PROTOPIC    60 g    Apply topically 2 times daily    Rash, Intertrigo       tamoxifen 20 MG tablet    NOLVADEX    90 tablet    Take 1 tablet (20 mg) by mouth daily    Malignant neoplasm of female breast, unspecified laterality, unspecified site of breast       triamcinolone 0.025 %  ointment    KENALOG    80 g    Apply topically 2 times daily Mix with 1 lb jar of vaseline or aquaphor    Intertrigo, Rash       TUMS 500 MG chewable tablet   Generic drug:  calcium carbonate     180 chew tab    Take 2 tablets (1,000 mg) by mouth nightly as needed for other (cramps)        * UNABLE TO FIND      1 tablet 3 times daily MEDICATION NAME: calcium D-Glucarate        * UNABLE TO FIND      2 tablets 3 times daily MEDICATION NAME: Natural D-Hist    Breast cancer, unspecified laterality, Papillary carcinoma, follicular variant (H), Chronic musculoskeletal pain       * UNABLE TO FIND      MEDICATION NAME: Tumeric 3 capsules daily        * UNABLE TO FIND      1 tablet 3 times daily MEDICATION NAME: Digestzymes    Thyroid cancer (H), Postsurgical hypothyroidism, Postsurgical hypoparathyroidism (H)       * UNABLE TO FIND      1 tablet daily MEDICATION NAME: Pure Encapsulations    Thyroid cancer (H), Postsurgical hypothyroidism, Postsurgical hypoparathyroidism (H)       VENTOLIN  (90 BASE) MCG/ACT Inhaler   Generic drug:  albuterol     18 g    INHALE 2 PUFFS INTO THE LUNGS EVERY 4 HOURS AS NEEDED FOR SHORTNESS OF BREATH OR DIFFICULT BREATHING OR WHEEZING    Mild intermittent asthma without complication       vitamin D3 2000 UNITS Caps     60 capsule    Take 10,000 Units by mouth daily    Thyroid cancer (H), Postsurgical hypothyroidism, Papillary carcinoma, follicular variant (H), Postsurgical hypoparathyroidism (H)       ZYRTEC ALLERGY 10 MG tablet   Generic drug:  cetirizine     90 tablet    Take 1 tablet (10 mg) by mouth 3 times daily On hold for lab test.        * Notice:  This list has 11 medication(s) that are the same as other medications prescribed for you. Read the directions carefully, and ask your doctor or other care provider to review them with you.

## 2017-09-13 NOTE — TELEPHONE ENCOUNTER
Received additional VM from patient today following a conversation with pharmacy that she is frustrated about her script for MS Contin. She states this was dated to be filled today and the pharmacy will not release it at this time. They will release Friday.    Spoke with [facility] pharmacist earlier this AM regarding this issue. Last refill of MS Contin 60mg tabs was 8/28/2017, #60 tabs. Per chart review, patient started taking 60mg TID on 8/29.   3 tabs/day x 15 days = 45 tabs.   60 total tabs - 45 tabs = 15 tabs left = 5 day supply.   Pharmacy agreed to fill script on Friday based on this math. Patient scheduled for biopsy Friday and based on this she states she cannot  the script that day.

## 2017-09-13 NOTE — TELEPHONE ENCOUNTER
[facility] Prior Authorization Team   Phone: [phone]  Fax: [phone]    PA Initiation    Medication: FV-Ketamine 6%, Lidocaine 10% ketoprofen 10 % in PLO  Insurance Company: [employer] - Phone [phone] Fax [phone]  Pharmacy Filling the Rx: [facility] - [place], [place] - [address]  Filling Pharmacy Phone: [phone]  Filling Pharmacy Fax: [phone]  Start Date: 9/13/2017

## 2017-09-13 NOTE — TELEPHONE ENCOUNTER
Referring provider: Dr. Crawley - [facility]  on[facility]    Clinic Contact: Same   Phone: [facility]    Requesting:   Procedure - EUS     Evaluation for: Biopsy of paraesophageal mass       Is patient aware of referral and ok to be contacted to schedule? Yes     Additional information: Pt also having IR guided biopsy on Friday. EUS needs ASAP.  Both IR and EUS will need done regardless of biopsy results.  Ideally in OR with MAC and option of general due to chronic narcotic and anxiolytic use.    MARQUISE Robles MD  Associate Professor of Medicine  Division of Gastroenterology, Hepatology and Nutrition  [facility]

## 2017-09-13 NOTE — TELEPHONE ENCOUNTER
Spoke with patient and explained issue with pharmacy. She verbalized understanding, but expressed frustration regarding being told several different  times of her medication. I sympathized with her on that and apologized for the confusion. I clarified with her why this happened and explained this can be picked up Friday. She requested this be sent to the [facility] so she can pick it up there following her biopsy. I spoke with both pharmacies ([facility] and [facility]) and relayed this plan and received confirmation the ms contin script is at the [facility], pending until Friday to be filled. Patient has my contact info for further arising questions/concerns.

## 2017-09-13 NOTE — PROGRESS NOTES
Care Coordination Telephone Call  GI Service and Surgical Oncology    Called patient to discuss tomorrow's EUS procedure.  There was no answer at her cell # and I have left message at home number with regard to NPO status for 6 hours prior to EUS and also need for  home and someone to be with her for 24 after the procedure.    I have asked the patient to call with any additional questions or concerns and have provided my contact information.    Plan:  EUS for tomorrow    Anne PERKINSN, HNBC, STAR-T  RN Care Coordinator  Surgical Oncology and GI service  Ph: [phone]  FAX: [phone]

## 2017-09-13 NOTE — TELEPHONE ENCOUNTER
Referring provider: Dr. Crawley - [facility] oncology    Clinic Contact: [facility] oncology clinic   Phone: [facility] oncology    Requesting:   Procedure - EUS    Evaluation for: Periesophageal mass, history of breast and thyroid cancer.       Is patient aware of referral and ok to be contacted to schedule? Yes     Additional information: Will also be having IR biopsy or paraspinous mass this Friday. The periesophageal lesion will require biopsy regardless.  Needs to be done ASAP.    Ideally in OR with MAC and option for general (due to chronic narcotics and anxiolytics. Could do in GI lab with MAC if more readily available.    MARQUISE Robles MD  Associate Professor of Medicine  Division of Gastroenterology, Hepatology and Nutrition  [facility]

## 2017-09-13 NOTE — NURSING NOTE
"Oncology Rooming Note    September 13, 2017 8:30 AM   Tisha Arias is a 56 year old female who presents for:    Chief Complaint   Patient presents with     Oncology Clinic Visit     New for Hilar Adenopathy     Initial Vitals: /78  Pulse 92  Temp 97  F (36.1  C) (Oral)  Resp 18  SpO2 99% Estimated body mass index is 32.12 kg/(m^2) as calculated from the following:    Height as of 9/8/17: 1.651 m (5' 5\").    Weight as of 9/11/17: 87.5 kg (193 lb). There is no height or weight on file to calculate BSA.  Extreme Pain (8) Comment: Data Unavailable   No LMP recorded. Patient has had a hysterectomy.  Allergies reviewed: Yes  Medications reviewed: Yes    Medications: Medication refills not needed today.  Pharmacy name entered into Allihub: [id number] - [place], [place] - [address]    Clinical concerns: nervous about procedure  [facility] was notified.  7  minutes for nursing intake (face to face time)     Sravanthi Strong MA              "

## 2017-09-13 NOTE — PROGRESS NOTES
[facility]  Lung Nodule Clinic Note  September 13, 2017    Chief complaint:  Tisha Arias is a 56 year old female seen in the Pulmonary Clinic  for   Chief Complaint   Patient presents with     Oncology Clinic Visit     New for Hilar Adenopathy       Assessment and Plan:  #1 known breast cancer on tamoxifen since 2014. Recent PET scan revealed 2 lesions one on the right 10th rib close to vertebral body that will be biopsied by interventional radiology this Friday under CT guidance. Also another lesion found in the left infrahilar area with PET positivity. We discussed possible diagnostic approaches such as EUS being the most accurate in diagnostic yield. Her oncologist will talk to GI to arrange EUS at the same time this Friday. The patient is in agreement with the plan.  #2 mosaic attenuation in both lungs based on pet and chest CT scans. This finding can be seen in small airway disease such as asthma or vascular disease such as pulmonary hypertension. Her asthma is well-controlled with rescue inhaler only. A dedicated CT scan of the chest can be Done to determine air-trapping and also an echocardiogram can be done to rule out pulmonary hypertension. I will defer these to her primary care provider after the above mentioned biopsies are done since they are more pressing issues at the moment.    I spent more than 45 minutes face to face and greater than 50% of time was for counseling and coordination of care about abnormal ct    History of Present Illness:   56 years old woman with history of breast cancer has been followed by oncology. Recently found to have 2 pet-positive areas as above. She is here today to discuss the biopsy options of left infrahilar lesion.     Exposure history: Asbestos;  No , TB;  No , Radiation;   No     Allergies   Allergen Reactions     Baclofen Other (See Comments) and Unknown     Other reaction(s): Edema, chest pain, seizures.      No Clinical Screening - See Comments Shortness Of  Breath, Palpitations, Anaphylaxis, Itching, Swelling, Difficulty breathing and Rash     Sukhjinder wipes- oral allergy -  July 2015: throat tightness from a Chinese herbal medicine Guillen Raciel Blank Barry Tran     Serotonin Anxiety, Other (See Comments) and Swelling     Seizures      Amitriptyline Hcl Swelling     Birch Trees      Potatoes, carrots, cherries, celery, apple, pears, plums, peaches, parsnip, kiwi, hazelnuts, and apricots,      Blue Dyes Itching     Headaches       Cymbalta Other (See Comments)     Flushing, tremor/muscle twitching and edema     Gabapentin Other (See Comments)     edema  Systemic edema, weaned off from Feb to March per Dr. Dowd.    edema     Grass      Mugwort [Artemisia Vulgaris]      Various spices     Ragweeds      Melons, bananas, cucumbers, zucchini.     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 No real need for Epipen for this alone.     GDM (gestational diabetes mellitus)     w first pregnancy only     Gestational hypertension      Lymphedema     jackson arms, chest     Migraine      Neuropathy associated with cancer (H)      Papillary carcinoma, follicular variant (H) 6/28/2013    pT3, N1, Mx, thyroid     Post-surgical hypothyroidism      Renal disease     kidney stones     Rhinitis, allergic to other allergen      Right Breast mass and microcalcifications 12/13/2011     Seasonal allergic conjunctivitis      Seasonal allergic rhinitis 4/12/11 skin tests pos. for: dog/M/T/G/W--NEGATIVE FOOD TESTS FOR: shrimp, crab,  lobster, coconut        Past Surgical History:   Procedure Laterality Date     BACK SURGERY  ,    Bulging disc w nerve root impigment     BIOPSY BREAST      right     BIOPSY BREAST  11    right, core and sterotactic     BYPASS GASTRIC, CHOLECYSTECTOMY, COMBINED       C LAPAROSCOPIC GASTRIC RESTRICTIVE PX, W/GASTRIC BYPASS/ GAMALIEL-EN-Y, < 150CM      Gamaliel en Y?      SECTION       COLONOSCOPY      normal     COSMETIC SURGERY  2012    Final Stage of Mastectomy     DAVINCI HYSTERECTOMY TOTAL, BILATERAL SALPINGO-OOPHORECTOMY, COMBINED  2012    Procedure: COMBINED DAVINCI HYSTERECTOMY TOTAL, SALPINGO-OOPHORECTOMY;  Davinci Total Laparoscopic Hysterectomy, Bilateral Salpingo Oophorectomy, Pelvic Washings, Cystoscopy;  Surgeon: Evie Sheikh MD;  Location: [facility] OR     ENT SURGERY       GI SURGERY  2007    Gamaliel-en Y w cholecystectomy     GYN SURGERY  89,1/10/92    2 c-sections     HYSTERECTOMY, PAP NO LONGER INDICATED      DeVinci assister lap hyst with BSO     IRRIGATION AND DEBRIDEMENT BREAST  3/1/2012    Procedure:IRRIGATION AND DEBRIDEMENT BREAST; Irrigation and Debridement, Wound Closure Right Breast; Surgeon:JUNG GUILLEN; Location:[facility] OR     MASTECTOMY, RECONSTRUCT BREAST, COMBINED  2012    Procedure:COMBINED MASTECTOMY, RECONSTRUCT BREAST; Bilateral Mastectomies, Right Axillary [place] Node Biopsy, Bilateral Breast Reconstruction with Tissue Expanders, Reconstruction of inframammary fold, bilateral pain management systems; Surgeon:ANUPAM CAMPBELL; Location:[facility] OR     RECONSTRUCT BREAST  2012    Procedure: RECONSTRUCT BREAST;  Bilateral 2nd stage breast reconstruction, revision,       SOFT TISSUE SURGERY  12    Mastectomy w severe myofascial pain syndrome     THYROIDECTOMY  7/10/2013    Procedure: THYROIDECTOMY;  Total Thyroidectomy with central neck dissection;  Surgeon: Indiana Sneed MD;  Location: [facility] OR     TONSILLECTOMY       childhood        Social History     Social History     Marital status:      Spouse name: N/A     Number of children: N/A     Years of education: N/A     Occupational History     Not on file.     Social History Main Topics     Smoking status: Never Smoker     Smokeless tobacco: Never Used      Comment: no 2nd hand smoke exposure     Alcohol use Yes      Comment: rare     Drug use: No     Sexual activity: Not Currently     Partners: Male     Birth control/ protection: Surgical      Comment: Tubal Ligation then hysterectomy     Other Topics Concern     Parent/Sibling W/ Cabg, Mi Or Angioplasty Before 65f 55m? Yes     Adrian Martinez     Social History Narrative        Family History   Problem Relation Age of Onset     Allergies Mother      Arthritis Mother      CANCER Mother      uterine/uterine     Hypertension Mother      on HCTZ     Hyperlipidemia Mother      CEREBROVASCULAR DISEASE Mother      s/p brain surgery     Breast Cancer Mother      Depression Mother      Anxiety Disorder Mother      Anesthesia Reaction Mother      Asthma Mother      Skin Cancer Mother      HEART DISEASE Father      DIABETES Father      Coronary Artery Disease Father      Hypertension Father      Hyperlipidemia Father      CEREBROVASCULAR DISEASE Father      Multiple strokes     Obesity Father      DIABETES Brother      Depression Brother      Hypertension Brother      CANCER Maternal Grandfather      pancreatic cancer/stomach cancer     Prostate Cancer Maternal Grandfather      Prostrate and Bladder     Other Cancer Maternal Grandfather      Pancreatic 1988, Bladder 1977     CANCER Maternal Grandmother      uterine     Breast Cancer Maternal Grandmother      Skin Cancer Maternal Grandmother      CANCER Brother 57     pancreatic cancer     DIABETES Brother      Breast Cancer Cousin      Grand mothers sister     Other Cancer Brother      Stage 3 Pancreatic 2-2015     Depression Brother      Asthma Brother      Obesity Brother       Anesthesia Reaction Other      H/a, itching, drug interactions     Asthma Other      CANCER Brother      Pancreatic      Thyroid Disease No family hx of         Immunization History   Administered Date(s) Administered     DTAP/HEPB/POLIO, INACTIVATED <7Y (PEDIARIX) 04/08/1997     HepB 03/25/1993, 04/22/1993, 08/26/1993     Influenza (IIV3) 10/20/2011, 10/12/2012, 10/03/2013, 10/16/2014     Influenza Vaccine IM 3yrs+ 4 Valent IIV4 09/12/2016     TD (ADULT, 7+) 04/08/1997     TDAP Vaccine (Adacel) 03/01/2007       Current Outpatient Prescriptions   Medication Sig     dexamethasone (DECADRON) 4 MG tablet Take 1 tablet (4 mg) by mouth 2 times daily (with meals)     oxyCODONE (ROXICODONE) 10 MG IR tablet Take 1.5-2 tablets (15-20 mg) by mouth every 4 hours as needed     morphine (MS CONTIN) 30 MG 12 hr tablet Take 1 tablet (30 mg) by mouth every 8 hours     diazepam (VALIUM) 5 MG tablet Take 1 tablet (5 mg) by mouth 3 times daily as needed For muscle spasms     levothyroxine (SYNTHROID/LEVOTHROID) 112 MCG tablet Mon-Sat: (2 tabs daily) Sunday: 3 tabs     hydrochlorothiazide (MICROZIDE) 12.5 MG capsule Take 1 capsule (12.5 mg) by mouth daily     SUMAtriptan (IMITREX) 50 MG tablet Take 1 tablet (50 mg) by mouth at onset of headache for migraine (may repeat in 2 hours as needed, max 2 tablets daily)     COLCRYS 0.6 MG tablet Take 1.6 mg by mouth 3 times daily     celecoxib (CELEBREX) 200 MG capsule Take 1 capsule (200 mg) by mouth 2 times daily     montelukast (SINGULAIR) 10 MG tablet TAKE TWO TABLETS BY MOUTH TWICE DAILY     chlorhexidine (PERIDEX) 0.12 % solution      HYDROcodone-acetaminophen (NORCO) 5-325 MG per tablet TK 1 T PO Q 4 TO 6 H PRN P     Colchicine 0.6 MG CAPS Take 0.6 mg by mouth 3 times daily for costochondritis (chest) discomfort. Scale back use to prn with loose stools or bloating.     EPINEPHrine (EPIPEN 2-CARIDAD) 0.3 MG/0.3ML injection Inject 0.3 mLs (0.3 mg) into the muscle once as needed for anaphylaxis      methocarbamol (ROBAXIN) 750 MG tablet Take 1 tablet (750 mg) by mouth 4 times daily as needed . Ok to take a 5th Robaxin on very severe days.     potassium chloride SA (POTASSIUM CHLORIDE) 20 MEQ CR tablet Take 1 tablet (20 mEq) by mouth daily     VENTOLIN  (90 BASE) MCG/ACT Inhaler INHALE 2 PUFFS INTO THE LUNGS EVERY 4 HOURS AS NEEDED FOR SHORTNESS OF BREATH OR DIFFICULT BREATHING OR WHEEZING     cromolyn (OPTICROM) 4 % ophthalmic solution Place 1 drop into both eyes 4 times daily     NASALCROM 5.2 MG/ACT AERS Inhaler SPRAY ONE SPRAY( 1 ML) IN NOSTRIL DAILY     Cholecalciferol (VITAMIN D3) 2000 UNITS CAPS Take 10,000 Units by mouth daily     ondansetron (ZOFRAN ODT) 4 MG ODT tab Take 1-2 tablets (4-8 mg) by mouth every 8 hours as needed for nausea or vomiting     COMPOUND (CMPD RX) - PHARMACY TO MIX COMPOUNDED MEDICATION Apply small amount to affected area two times daily.Ketamine 6% + ketoprofen 10% + lidocaine 10% in Lipo.     lidocaine (XYLOCAINE) 5 % ointment SANJANA TOPICALLY QID PRN     order for DME Equipment being ordered: compression stockings. 20-30 mm or 30 - 40 mm as patient can tolerate. Physical therapy to determine if they should be above or below the knee.     order for DME Equipment being ordered: compression bra     ranitidine (ZANTAC) 75 MG tablet Take 1 tablet (75 mg) by mouth 3 times daily     tamoxifen (NOLVADEX) 20 MG tablet Take 1 tablet (20 mg) by mouth daily     Lidocaine HCl Monohydrate POWD      calcitRIOL (ROCALTROL) 0.25 MCG capsule 2 tabs in am and 1 tab qhs     aluminum chloride (DRYSOL) 20 % external solution Apply topically At Bedtime     tacrolimus (PROTOPIC) 0.1 % ointment Apply topically 2 times daily     triamcinolone (KENALOG) 0.025 % ointment Apply topically 2 times daily Mix with 1 lb jar of vaseline or aquaphor     UNABLE TO FIND MEDICATION NAME: Tumeric 3 capsules daily     cetirizine (ZYRTEC ALLERGY) 10 MG tablet Take 1 tablet (10 mg) by mouth 3 times daily On hold  for lab test.     diclofenac (VOLTAREN) 1 % GEL Apply affected area two times daily PRN using enclosed dosing card.     mometasone (ASMANEX 30 METERED DOSES) 110 MCG/INH inhaler Inhale 1 puff into the lungs daily     UNABLE TO FIND 2 tablets 3 times daily MEDICATION NAME: Natural D-Hist     MAGNESIUM CITRATE PO Take 400 mg by mouth daily     DiphenhydrAMINE HCl (BENADRYL PO) Take 50 mg by mouth as needed     calcium citrate-vitamin D (CALCIUM CITRATE +D) 315-250 MG-UNIT TABS Take 2 tablets by mouth 3 times daily     calcium carbonate (TUMS) 500 MG chewable tablet Take 2 tablets (1,000 mg) by mouth nightly as needed for other (cramps)     UNABLE TO FIND 1 tablet 3 times daily MEDICATION NAME: calcium D-Glucarate     Digestive Enzymes (BETAINE HCL PO) Take 2 tablets by mouth as needed Betaine HCL and Pepsin: Take 1-7 tabs by mouth daily, if burning take one tablet less once daily.  Betaine HCL 1.04 g  Pepsin 40 mg     Triamcinolone Acetonide (AZMACORT IN) Inhale 2 puffs into the lungs as needed     UNABLE TO FIND 1 tablet 3 times daily MEDICATION NAME: Digestzymes     UNABLE TO FIND 1 tablet daily MEDICATION NAME: Pure Encapsulations     cyclobenzaprine (FLEXERIL) 10 MG tablet Take 10 mg by mouth 3 times daily as needed On hold Dr Monsalve     Omega-3 Fatty Acids (OMEGA 3 PO) Take 5 mLs by mouth     ACAI PO Take 1,000 mg by mouth 2 times daily     Probiotic Product (PROBIOTIC DAILY PO) Take 1 capsule by mouth daily Lacto acid bifidobacterium     polyethylene glycol (MIRALAX) powder Take 17 g by mouth daily     docusate sodium 100 MG tablet Take 100 mg by mouth daily     morphine (MS CONTIN) 15 MG 12 hr tablet      No current facility-administered medications for this visit.         Review of Systems:  I have done 10 points of review systems and pertinent findings are negative.    Physical examination  Constitutional: Alert, oriented, not in distress  Vitals: B/P: 151/78, T: 97, P: 92, R: 18  Eyes: No icterus, nystagmus,  pupils isocoric  Musculoskeletal: Normal muscle mass, no dephormity  Integumentary:  No rash, normal texture and turgor, no edema  Neurological: Alert, orientedx3, no motor deficits, cranial nerves grossly normal  Psychiatric:  Mood and affect are appropriate with insight into his/her medical condition  Hematologic/Immunologic/Lymphatic: No bruise, no lymph node enargement     Data:  Lab Results   Component Value Date    WBC 3.7 09/07/2017     Lab Results   Component Value Date    RBC 4.28 09/07/2017     Lab Results   Component Value Date    HGB 10.5 09/07/2017     Lab Results   Component Value Date    HCT 34.0 09/07/2017     Lab Results   Component Value Date    MCV 79 09/07/2017     Lab Results   Component Value Date    MCH 24.5 09/07/2017     Lab Results   Component Value Date    MCHC 30.9 09/07/2017     Lab Results   Component Value Date    RDW 15.2 09/07/2017     Lab Results   Component Value Date     09/07/2017       Lab Results   Component Value Date     09/07/2017      Lab Results   Component Value Date    POTASSIUM 3.9 09/07/2017     Lab Results   Component Value Date    CHLORIDE 104 09/07/2017     Lab Results   Component Value Date    ELMO 8.6 09/07/2017     Lab Results   Component Value Date    CO2 28 09/07/2017     Lab Results   Component Value Date    BUN 13 09/07/2017     Lab Results   Component Value Date    CR 0.85 09/07/2017     Lab Results   Component Value Date     09/07/2017       Thank you for allowing me participate in the care of Tisha Arias.    ZEB Dobbs MD

## 2017-09-13 NOTE — PROGRESS NOTES
Care Coordination New Patient Referral  Surgical Oncology and GI    NP referral date: 09/13/17  Advanced endoscopy provider to provider referral note:    Referring provider: Dr. Crawley - [facility]     Clinic Contact: Same                                            Phone: [facility]     Requesting:   Procedure - EUS                        Evaluation for: Biopsy of paraesophageal mass         Is patient aware of referral and ok to be contacted to schedule? Yes      Additional information: Pt also having IR guided biopsy on Friday. EUS needs ASAP.  Both IR and EUS will need done regardless of biopsy results.  Ideally in OR with MAC and option of general due to chronic narcotic and anxiolytic use.     MARQUISE Robles MD  Associate Professor of Medicine  Division of Gastroenterology, Hepatology and Nutrition  [facility]    Information above reviewed.  We will plan for EUS in OR tomorrow at the request of Dr. Robles and our  will contact the patient.  There is history and physical done by Dr. Crawley on 9/7/17.  Orders have been placed for procedure.    Anne PERKINSN, HNBC  RN Care Coordinator  Surgical Oncology  Ph: [phone]  Email: [email or web address]

## 2017-09-13 NOTE — ANESTHESIA PREPROCEDURE EVALUATION
Anesthesia Evaluation     .             ROS/MED HX    ENT/Pulmonary:     (+)asthma , . .    Neurologic:     (+)neuropathy     Cardiovascular:         METS/Exercise Tolerance:     Hematologic:         Musculoskeletal: Comment: Chronic chest pain   Lymphedema           GI/Hepatic: Comment: Gastric Bypass 2007        Renal/Genitourinary:         Endo: Comment: S/p thyroidectomy     (+) thyroid problem hypothyroidism, Chronic steroid usage for .      Psychiatric:         Infectious Disease:         Malignancy:   (+) Malignancy History of Breast  Breast CA Active status post Chemo and Surgery.         Other:    (+) No chance of pregnancy H/O Chronic Pain,H/O chronic opiod use ,                  Procedure: Procedure(s):  Esophagogastroduodenoscopy, Endoscopic Ultrasound  - Wound Class:    - Wound Class:     HPI: Tisha Arias is a 56 year old female who presents for the above procedure with Dr. Robles.    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 No real need for Epipen for this alone.     GDM (gestational diabetes mellitus)     w first pregnancy only     Gestational hypertension      Lymphedema     jackson arms, chest     Migraine      Neuropathy associated with cancer (H)      Papillary carcinoma, follicular variant (H) 6/28/2013    pT3, N1, Mx, thyroid     Post-surgical hypothyroidism      Renal disease     kidney stones     Rhinitis, allergic to other allergen      Right Breast mass and microcalcifications 12/13/2011     Seasonal allergic conjunctivitis      Seasonal allergic rhinitis 4/12/11 skin tests pos. for: dog/M/T/G/W--NEGATIVE FOOD TESTS FOR: shrimp, crab, lobster, coconut        Past Surgical History:   Procedure Laterality Date     BACK SURGERY  ,    Bulging disc w nerve root impigment     BIOPSY BREAST      right     BIOPSY BREAST  11    right, core and sterotactic     BYPASS GASTRIC, CHOLECYSTECTOMY, COMBINED       C LAPAROSCOPIC GASTRIC RESTRICTIVE PX, W/GASTRIC BYPASS/ GAMALIEL-EN-Y, < 150CM      Gamaliel en Y?      SECTION       COLONOSCOPY      normal     COSMETIC SURGERY  2012    Final Stage of Mastectomy     DAVINCI HYSTERECTOMY TOTAL, BILATERAL SALPINGO-OOPHORECTOMY, COMBINED  2012    Procedure: COMBINED DAVINCI HYSTERECTOMY TOTAL, SALPINGO-OOPHORECTOMY;  Davinci Total Laparoscopic Hysterectomy, Bilateral Salpingo Oophorectomy, Pelvic Washings, Cystoscopy;  Surgeon: Evie Sheikh MD;  Location: [facility] OR     ENT SURGERY       GI SURGERY  2007    Gamaliel-en Y w cholecystectomy     GYN SURGERY  89,1/10/92    2 c-sections     HYSTERECTOMY, PAP NO LONGER INDICATED      DeVinci assister lap hyst with BSO     IRRIGATION AND DEBRIDEMENT BREAST  3/1/2012    Procedure:IRRIGATION AND DEBRIDEMENT BREAST; Irrigation and Debridement, Wound Closure Right Breast; Surgeon:JUNG GUILLEN; Location:[facility] OR     MASTECTOMY, RECONSTRUCT BREAST, COMBINED  2012    Procedure:COMBINED MASTECTOMY, RECONSTRUCT BREAST; Bilateral Mastectomies, Right Axillary [place] Node Biopsy, Bilateral Breast Reconstruction with Tissue Expanders, Reconstruction of inframammary fold, bilateral pain management systems; Surgeon:ANUPAM CAMPBELL; Location:[facility] OR     RECONSTRUCT BREAST  2012    Procedure: RECONSTRUCT BREAST;  Bilateral 2nd stage breast reconstruction, revision,       SOFT TISSUE SURGERY  12    Mastectomy w severe myofascial pain syndrome     THYROIDECTOMY  7/10/2013    Procedure: THYROIDECTOMY;  Total Thyroidectomy with central neck dissection;  Surgeon: Indiana Sneed MD;  Location: [facility] OR     TONSILLECTOMY      childhood          No current facility-administered medications on file prior to encounter.   Current Outpatient Prescriptions on File Prior to Encounter:  morphine (MS CONTIN) 15 MG 12 hr tablet    dexamethasone (DECADRON) 4 MG tablet Take 1 tablet (4 mg) by mouth 2 times daily (with meals)   oxyCODONE (ROXICODONE) 10 MG IR tablet Take 1.5-2 tablets (15-20 mg) by mouth every 4 hours as needed   morphine (MS CONTIN) 30 MG 12 hr tablet Take 1 tablet (30 mg) by mouth every 8 hours   diazepam (VALIUM) 5 MG tablet Take 1 tablet (5 mg) by mouth 3 times daily as needed For muscle spasms   levothyroxine (SYNTHROID/LEVOTHROID) 112 MCG tablet Mon-Sat: (2 tabs daily) Sunday: 3 tabs   hydrochlorothiazide (MICROZIDE) 12.5 MG capsule Take 1 capsule (12.5 mg) by mouth daily   SUMAtriptan (IMITREX) 50 MG tablet Take 1 tablet (50 mg) by mouth at onset of headache for migraine (may repeat in 2 hours as needed, max 2 tablets daily)   COLCRYS 0.6 MG tablet Take 1.6 mg by mouth 3 times daily   celecoxib (CELEBREX) 200 MG capsule Take 1 capsule (200 mg) by mouth 2 times daily   montelukast (SINGULAIR) 10 MG tablet TAKE TWO TABLETS BY MOUTH TWICE DAILY   chlorhexidine (PERIDEX) 0.12 % solution    HYDROcodone-acetaminophen (NORCO) 5-325 MG per tablet TK 1 T PO Q 4 TO 6 H PRN P   Colchicine 0.6 MG CAPS Take 0.6 mg by mouth 3 times daily for costochondritis (chest) discomfort. Scale back use to prn with loose stools or bloating.   EPINEPHrine (EPIPEN 2-CARIDAD) 0.3 MG/0.3ML injection Inject 0.3 mLs (0.3 mg) into the muscle once as needed for anaphylaxis   methocarbamol (ROBAXIN) 750 MG tablet Take 1 tablet (750 mg) by mouth 4 times daily as needed . Ok to take a 5th Robaxin on very severe days.   potassium chloride SA (POTASSIUM CHLORIDE) 20 MEQ CR tablet Take 1 tablet (20 mEq) by mouth daily   VENTOLIN  (90 BASE) MCG/ACT Inhaler INHALE 2 PUFFS INTO THE LUNGS EVERY 4 HOURS AS NEEDED FOR SHORTNESS OF BREATH OR DIFFICULT BREATHING OR WHEEZING    cromolyn (OPTICROM) 4 % ophthalmic solution Place 1 drop into both eyes 4 times daily   NASALCROM 5.2 MG/ACT AERS Inhaler SPRAY ONE SPRAY( 1 ML) IN NOSTRIL DAILY   Cholecalciferol (VITAMIN D3) 2000 UNITS CAPS Take 10,000 Units by mouth daily   ondansetron (ZOFRAN ODT) 4 MG ODT tab Take 1-2 tablets (4-8 mg) by mouth every 8 hours as needed for nausea or vomiting   COMPOUND (CMPD RX) - PHARMACY TO MIX COMPOUNDED MEDICATION Apply small amount to affected area two times daily.Ketamine 6% + ketoprofen 10% + lidocaine 10% in Lipo.   lidocaine (XYLOCAINE) 5 % ointment SANJANA TOPICALLY QID PRN   order for DME Equipment being ordered: compression stockings. 20-30 mm or 30 - 40 mm as patient can tolerate. Physical therapy to determine if they should be above or below the knee.   order for DME Equipment being ordered: compression bra   ranitidine (ZANTAC) 75 MG tablet Take 1 tablet (75 mg) by mouth 3 times daily   tamoxifen (NOLVADEX) 20 MG tablet Take 1 tablet (20 mg) by mouth daily   Lidocaine HCl Monohydrate POWD    calcitRIOL (ROCALTROL) 0.25 MCG capsule 2 tabs in am and 1 tab qhs   aluminum chloride (DRYSOL) 20 % external solution Apply topically At Bedtime   tacrolimus (PROTOPIC) 0.1 % ointment Apply topically 2 times daily   triamcinolone (KENALOG) 0.025 % ointment Apply topically 2 times daily Mix with 1 lb jar of vaseline or aquaphor   UNABLE TO FIND MEDICATION NAME: Tumeric 3 capsules daily   cetirizine (ZYRTEC ALLERGY) 10 MG tablet Take 1 tablet (10 mg) by mouth 3 times daily On hold for lab test.   diclofenac (VOLTAREN) 1 % GEL Apply affected area two times daily PRN using enclosed dosing card.   mometasone (ASMANEX 30 METERED DOSES) 110 MCG/INH inhaler Inhale 1 puff into the lungs daily   UNABLE TO FIND 2 tablets 3 times daily MEDICATION NAME: Natural D-Hist   MAGNESIUM CITRATE PO Take 400 mg by mouth daily   DiphenhydrAMINE HCl (BENADRYL PO) Take 50 mg by mouth as needed   calcium citrate-vitamin D (CALCIUM  CITRATE +D) 315-250 MG-UNIT TABS Take 2 tablets by mouth 3 times daily   calcium carbonate (TUMS) 500 MG chewable tablet Take 2 tablets (1,000 mg) by mouth nightly as needed for other (cramps)   UNABLE TO FIND 1 tablet 3 times daily MEDICATION NAME: calcium D-Glucarate   Digestive Enzymes (BETAINE HCL PO) Take 2 tablets by mouth as needed Betaine HCL and Pepsin: Take 1-7 tabs by mouth daily, if burning take one tablet less once daily.Betaine HCL 1.04 gPepsin 40 mg   Triamcinolone Acetonide (AZMACORT IN) Inhale 2 puffs into the lungs as needed   UNABLE TO FIND 1 tablet 3 times daily MEDICATION NAME: Digestzymes   UNABLE TO FIND 1 tablet daily MEDICATION NAME: Pure Encapsulations   cyclobenzaprine (FLEXERIL) 10 MG tablet Take 10 mg by mouth 3 times daily as needed On hold Dr Monsalve   Omega-3 Fatty Acids (OMEGA 3 PO) Take 5 mLs by mouth   ACAI PO Take 1,000 mg by mouth 2 times daily   Probiotic Product (PROBIOTIC DAILY PO) Take 1 capsule by mouth daily Lacto acid bifidobacterium   polyethylene glycol (MIRALAX) powder Take 17 g by mouth daily   docusate sodium 100 MG tablet Take 100 mg by mouth daily       Social Hx:   Social History   Substance Use Topics     Smoking status: Never Smoker     Smokeless tobacco: Never Used      Comment: no 2nd hand smoke exposure     Alcohol use Yes      Comment: rare       Allergies:   Allergies   Allergen Reactions     Baclofen Other (See Comments) and Unknown     Other reaction(s): Edema, chest pain, seizures.      No Clinical Screening - See Comments Shortness Of Breath, Palpitations, Anaphylaxis, Itching, Swelling, Difficulty breathing and Rash     Sukhjinder wipes- oral allergy -  July 2015: throat tightness from a Chinese herbal medicine Wilmer Tran     Serotonin Anxiety, Other (See Comments) and Swelling     Seizures      Amitriptyline Hcl Swelling     Birch Trees      Potatoes, carrots, cherries, celery, apple, pears, plums, peaches, parsnip, kiwi, hazelnuts, and apricots,       Blue Dyes Itching     Headaches       Cymbalta Other (See Comments)     Flushing, tremor/muscle twitching and edema     Gabapentin Other (See Comments)     edema  Systemic edema, weaned off from Feb to March per Dr. Dowd.    edema     Grass      Mugwort [Artemisia Vulgaris]      Various spices     Ragweeds      Melons, bananas, cucumbers, zucchini.     Topamax      Nortriptyline Itching, Visual Disturbance, Swelling, GI Disturbance, Anxiety, Other (See Comments) and Nausea     Other reaction(s): Swelling         NPO Status: Per ASA Guidelines    Labs:    Blood Bank:  Lab Results   Component Value Date    ABO O 12/12/2012    RH  Pos 12/12/2012    AS Neg 12/12/2012     BMP:  Recent Labs   Lab Test  09/07/17   1021   NA  139   POTASSIUM  3.9   CHLORIDE  104   CO2  28   BUN  13   CR  0.85   GLC  111*   ELMO  8.6     CBC:   Recent Labs   Lab Test  09/07/17   1021   WBC  3.7*   RBC  4.28   HGB  10.5*   HCT  34.0*   MCV  79   MCH  24.5*   MCHC  30.9*   RDW  15.2*   PLT  258     Coags:  Recent Labs   Lab Test  09/01/17   1623   INR  1.00   PTT  26         Physical Exam  Normal systems: cardiovascular, pulmonary and dental    Airway   Mallampati: I  TM distance: >3 FB  Neck ROM: full    Dental     Cardiovascular   Rhythm and rate: regular and normal      Pulmonary    breath sounds clear to auscultation                    Anesthesia Plan      History & Physical Review      ASA Status:  3 .        Plan for MAC          Postoperative Care      Consents        - ASA 3  - GETA with standard ASA monitors, IV induction, balanced anesthetic  - PIV  - Antibiotics per surgery  - PONV prophylaxis  - Pain management with Fentanyl/dilaudid boluses    Ame Rocha MD CA-1                      .

## 2017-09-14 ENCOUNTER — SURGERY (OUTPATIENT)
Age: 57
End: 2017-09-14

## 2017-09-14 ENCOUNTER — ANESTHESIA (OUTPATIENT)
Dept: SURGERY | Facility: CLINIC | Age: 57
End: 2017-09-14
Payer: COMMERCIAL

## 2017-09-14 ENCOUNTER — HOSPITAL ENCOUNTER (OUTPATIENT)
Facility: CLINIC | Age: 57
Discharge: HOME | End: 2017-09-14
Attending: INTERNAL MEDICINE | Admitting: INTERNAL MEDICINE
Payer: COMMERCIAL

## 2017-09-14 ENCOUNTER — TELEPHONE (OUTPATIENT)
Dept: ONCOLOGY | Facility: CLINIC | Age: 57
End: 2017-09-14

## 2017-09-14 VITALS
DIASTOLIC BLOOD PRESSURE: 71 MMHG | SYSTOLIC BLOOD PRESSURE: 115 MMHG | HEIGHT: 65 IN | BODY MASS INDEX: 32.29 KG/M2 | RESPIRATION RATE: 16 BRPM | TEMPERATURE: 98.6 F | WEIGHT: 193.78 LBS | OXYGEN SATURATION: 96 %

## 2017-09-14 LAB
BUN SERPL-MCNC: 15 MG/DL (ref 7–30)
ERYTHROCYTE [DISTWIDTH] IN BLOOD BY AUTOMATED COUNT: 15.1 % (ref 10–15)
HCT VFR BLD AUTO: 36.2 % (ref 35–47)
HGB BLD-MCNC: 11 G/DL (ref 11.7–15.7)
INR PPP: 1.02 (ref 0.86–1.14)
MCH RBC QN AUTO: 24.8 PG (ref 26.5–33)
MCHC RBC AUTO-ENTMCNC: 30.4 G/DL (ref 31.5–36.5)
MCV RBC AUTO: 82 FL (ref 78–100)
PLATELET # BLD AUTO: 319 10E9/L (ref 150–450)
RBC # BLD AUTO: 4.44 10E12/L (ref 3.8–5.2)
WBC # BLD AUTO: 7.1 10E9/L (ref 4–11)

## 2017-09-14 PROCEDURE — 85027 COMPLETE CBC AUTOMATED: CPT | Performed by: INTERNAL MEDICINE

## 2017-09-14 PROCEDURE — 84520 ASSAY OF UREA NITROGEN: CPT | Performed by: INTERNAL MEDICINE

## 2017-09-14 PROCEDURE — 36000057 ZZH SURGERY LEVEL 3 1ST 30 MIN - UMMC: Performed by: INTERNAL MEDICINE

## 2017-09-14 PROCEDURE — 37000009 ZZH ANESTHESIA TECHNICAL FEE, EACH ADDTL 15 MIN: Performed by: INTERNAL MEDICINE

## 2017-09-14 PROCEDURE — 36415 COLL VENOUS BLD VENIPUNCTURE: CPT | Performed by: INTERNAL MEDICINE

## 2017-09-14 PROCEDURE — C9399 UNCLASSIFIED DRUGS OR BIOLOG: HCPCS | Performed by: NURSE ANESTHETIST, CERTIFIED REGISTERED

## 2017-09-14 PROCEDURE — 36000059 ZZH SURGERY LEVEL 3 EA 15 ADDTL MIN UMMC: Performed by: INTERNAL MEDICINE

## 2017-09-14 PROCEDURE — 71000027 ZZH RECOVERY PHASE 2 EACH 15 MINS: Performed by: INTERNAL MEDICINE

## 2017-09-14 PROCEDURE — 25000132 ZZH RX MED GY IP 250 OP 250 PS 637: Performed by: ANESTHESIOLOGY

## 2017-09-14 PROCEDURE — 25000128 H RX IP 250 OP 636: Performed by: NURSE ANESTHETIST, CERTIFIED REGISTERED

## 2017-09-14 PROCEDURE — 88172 CYTP DX EVAL FNA 1ST EA SITE: CPT | Performed by: INTERNAL MEDICINE

## 2017-09-14 PROCEDURE — 27211024 ZZHC OR SUPPLY OTHER OPNP: Performed by: INTERNAL MEDICINE

## 2017-09-14 PROCEDURE — 40000170 ZZH STATISTIC PRE-PROCEDURE ASSESSMENT II: Performed by: INTERNAL MEDICINE

## 2017-09-14 PROCEDURE — 25000128 H RX IP 250 OP 636: Performed by: STUDENT IN AN ORGANIZED HEALTH CARE EDUCATION/TRAINING PROGRAM

## 2017-09-14 PROCEDURE — 27210794 ZZH OR GENERAL SUPPLY STERILE: Performed by: INTERNAL MEDICINE

## 2017-09-14 PROCEDURE — 00000155 ZZHCL STATISTIC H-CELL BLOCK W/STAIN: Performed by: INTERNAL MEDICINE

## 2017-09-14 PROCEDURE — 88173 CYTOPATH EVAL FNA REPORT: CPT | Performed by: INTERNAL MEDICINE

## 2017-09-14 PROCEDURE — 37000008 ZZH ANESTHESIA TECHNICAL FEE, 1ST 30 MIN: Performed by: INTERNAL MEDICINE

## 2017-09-14 PROCEDURE — 25000125 ZZHC RX 250: Performed by: INTERNAL MEDICINE

## 2017-09-14 PROCEDURE — 85610 PROTHROMBIN TIME: CPT | Performed by: INTERNAL MEDICINE

## 2017-09-14 PROCEDURE — 88305 TISSUE EXAM BY PATHOLOGIST: CPT | Performed by: INTERNAL MEDICINE

## 2017-09-14 PROCEDURE — 25000565 ZZH ISOFLURANE, EA 15 MIN: Performed by: INTERNAL MEDICINE

## 2017-09-14 PROCEDURE — 25000125 ZZHC RX 250: Performed by: STUDENT IN AN ORGANIZED HEALTH CARE EDUCATION/TRAINING PROGRAM

## 2017-09-14 PROCEDURE — 25000566 ZZH SEVOFLURANE, EA 15 MIN: Performed by: INTERNAL MEDICINE

## 2017-09-14 PROCEDURE — 25000128 H RX IP 250 OP 636: Performed by: ANESTHESIOLOGY

## 2017-09-14 PROCEDURE — 71000016 ZZH RECOVERY PHASE 1 LEVEL 3 FIRST HR: Performed by: INTERNAL MEDICINE

## 2017-09-14 RX ORDER — SODIUM CHLORIDE, SODIUM LACTATE, POTASSIUM CHLORIDE, CALCIUM CHLORIDE 600; 310; 30; 20 MG/100ML; MG/100ML; MG/100ML; MG/100ML
INJECTION, SOLUTION INTRAVENOUS CONTINUOUS PRN
Status: DISCONTINUED | OUTPATIENT
Start: 2017-09-14 | End: 2017-09-14

## 2017-09-14 RX ORDER — ONDANSETRON 2 MG/ML
INJECTION INTRAMUSCULAR; INTRAVENOUS PRN
Status: DISCONTINUED | OUTPATIENT
Start: 2017-09-14 | End: 2017-09-14

## 2017-09-14 RX ORDER — FENTANYL CITRATE 50 UG/ML
INJECTION, SOLUTION INTRAMUSCULAR; INTRAVENOUS PRN
Status: DISCONTINUED | OUTPATIENT
Start: 2017-09-14 | End: 2017-09-14

## 2017-09-14 RX ORDER — METHOCARBAMOL 750 MG/1
750 TABLET, FILM COATED ORAL ONCE
Status: COMPLETED | OUTPATIENT
Start: 2017-09-14 | End: 2017-09-14

## 2017-09-14 RX ORDER — FLUMAZENIL 0.1 MG/ML
0.2 INJECTION, SOLUTION INTRAVENOUS
Status: DISCONTINUED | OUTPATIENT
Start: 2017-09-14 | End: 2017-09-14 | Stop reason: HOSPADM

## 2017-09-14 RX ORDER — NALOXONE HYDROCHLORIDE 0.4 MG/ML
.1-.4 INJECTION, SOLUTION INTRAMUSCULAR; INTRAVENOUS; SUBCUTANEOUS
Status: DISCONTINUED | OUTPATIENT
Start: 2017-09-14 | End: 2017-09-14 | Stop reason: HOSPADM

## 2017-09-14 RX ORDER — DIAZEPAM 5 MG
5 TABLET ORAL ONCE
Status: COMPLETED | OUTPATIENT
Start: 2017-09-14 | End: 2017-09-14

## 2017-09-14 RX ORDER — ONDANSETRON 4 MG/1
4 TABLET, ORALLY DISINTEGRATING ORAL EVERY 30 MIN PRN
Status: DISCONTINUED | OUTPATIENT
Start: 2017-09-14 | End: 2017-09-14 | Stop reason: HOSPADM

## 2017-09-14 RX ORDER — LIDOCAINE HYDROCHLORIDE 20 MG/ML
INJECTION, SOLUTION INFILTRATION; PERINEURAL PRN
Status: DISCONTINUED | OUTPATIENT
Start: 2017-09-14 | End: 2017-09-14

## 2017-09-14 RX ORDER — METHOCARBAMOL 750 MG/1
750 TABLET, FILM COATED ORAL ONCE
Status: DISCONTINUED | OUTPATIENT
Start: 2017-09-14 | End: 2017-09-14 | Stop reason: HOSPADM

## 2017-09-14 RX ORDER — DEXAMETHASONE SODIUM PHOSPHATE 4 MG/ML
INJECTION, SOLUTION INTRA-ARTICULAR; INTRALESIONAL; INTRAMUSCULAR; INTRAVENOUS; SOFT TISSUE PRN
Status: DISCONTINUED | OUTPATIENT
Start: 2017-09-14 | End: 2017-09-14

## 2017-09-14 RX ORDER — FENTANYL CITRATE 50 UG/ML
25-50 INJECTION, SOLUTION INTRAMUSCULAR; INTRAVENOUS EVERY 5 MIN PRN
Status: DISCONTINUED | OUTPATIENT
Start: 2017-09-14 | End: 2017-09-14 | Stop reason: HOSPADM

## 2017-09-14 RX ORDER — HYDROMORPHONE HYDROCHLORIDE 1 MG/ML
.3-.5 INJECTION, SOLUTION INTRAMUSCULAR; INTRAVENOUS; SUBCUTANEOUS
Status: DISCONTINUED | OUTPATIENT
Start: 2017-09-14 | End: 2017-09-14

## 2017-09-14 RX ORDER — HYDROMORPHONE HYDROCHLORIDE 1 MG/ML
.3-.5 INJECTION, SOLUTION INTRAMUSCULAR; INTRAVENOUS; SUBCUTANEOUS EVERY 10 MIN PRN
Status: DISCONTINUED | OUTPATIENT
Start: 2017-09-14 | End: 2017-09-14 | Stop reason: HOSPADM

## 2017-09-14 RX ORDER — ONDANSETRON 2 MG/ML
4 INJECTION INTRAMUSCULAR; INTRAVENOUS ONCE
Status: COMPLETED | OUTPATIENT
Start: 2017-09-14 | End: 2017-09-14

## 2017-09-14 RX ORDER — ONDANSETRON 2 MG/ML
4 INJECTION INTRAMUSCULAR; INTRAVENOUS EVERY 30 MIN PRN
Status: DISCONTINUED | OUTPATIENT
Start: 2017-09-14 | End: 2017-09-14 | Stop reason: HOSPADM

## 2017-09-14 RX ORDER — SODIUM CHLORIDE, SODIUM LACTATE, POTASSIUM CHLORIDE, CALCIUM CHLORIDE 600; 310; 30; 20 MG/100ML; MG/100ML; MG/100ML; MG/100ML
INJECTION, SOLUTION INTRAVENOUS CONTINUOUS
Status: DISCONTINUED | OUTPATIENT
Start: 2017-09-14 | End: 2017-09-14 | Stop reason: HOSPADM

## 2017-09-14 RX ORDER — HYDRALAZINE HYDROCHLORIDE 20 MG/ML
2.5-5 INJECTION INTRAMUSCULAR; INTRAVENOUS EVERY 10 MIN PRN
Status: DISCONTINUED | OUTPATIENT
Start: 2017-09-14 | End: 2017-09-14 | Stop reason: HOSPADM

## 2017-09-14 RX ORDER — DIAZEPAM 5 MG
5 TABLET ORAL ONCE
Status: DISCONTINUED | OUTPATIENT
Start: 2017-09-14 | End: 2017-09-14 | Stop reason: HOSPADM

## 2017-09-14 RX ORDER — PROPOFOL 10 MG/ML
INJECTION, EMULSION INTRAVENOUS PRN
Status: DISCONTINUED | OUTPATIENT
Start: 2017-09-14 | End: 2017-09-14

## 2017-09-14 RX ORDER — LABETALOL HYDROCHLORIDE 5 MG/ML
5 INJECTION, SOLUTION INTRAVENOUS
Status: DISCONTINUED | OUTPATIENT
Start: 2017-09-14 | End: 2017-09-14 | Stop reason: HOSPADM

## 2017-09-14 RX ORDER — LIDOCAINE 40 MG/G
CREAM TOPICAL
Status: DISCONTINUED | OUTPATIENT
Start: 2017-09-14 | End: 2017-09-14 | Stop reason: HOSPADM

## 2017-09-14 RX ADMIN — ONDANSETRON 4 MG: 2 INJECTION INTRAMUSCULAR; INTRAVENOUS at 15:47

## 2017-09-14 RX ADMIN — SIMETHICONE 133 MG: 63.3; 3.7 SOLUTION/ DROPS ORAL at 15:08

## 2017-09-14 RX ADMIN — LIDOCAINE HYDROCHLORIDE 100 MG: 20 INJECTION, SOLUTION INFILTRATION; PERINEURAL at 14:35

## 2017-09-14 RX ADMIN — FENTANYL CITRATE 50 MCG: 50 INJECTION, SOLUTION INTRAMUSCULAR; INTRAVENOUS at 16:15

## 2017-09-14 RX ADMIN — FENTANYL CITRATE 50 MCG: 50 INJECTION, SOLUTION INTRAMUSCULAR; INTRAVENOUS at 15:29

## 2017-09-14 RX ADMIN — HYDROMORPHONE HYDROCHLORIDE 0.5 MG: 1 INJECTION, SOLUTION INTRAMUSCULAR; INTRAVENOUS; SUBCUTANEOUS at 16:30

## 2017-09-14 RX ADMIN — SUGAMMADEX 150 MG: 100 INJECTION, SOLUTION INTRAVENOUS at 15:37

## 2017-09-14 RX ADMIN — FENTANYL CITRATE 100 MCG: 50 INJECTION, SOLUTION INTRAMUSCULAR; INTRAVENOUS at 14:35

## 2017-09-14 RX ADMIN — METHOCARBAMOL 750 MG: 750 TABLET ORAL at 17:38

## 2017-09-14 RX ADMIN — HYDROMORPHONE HYDROCHLORIDE 0.5 MG: 1 INJECTION, SOLUTION INTRAMUSCULAR; INTRAVENOUS; SUBCUTANEOUS at 16:40

## 2017-09-14 RX ADMIN — SODIUM CHLORIDE, POTASSIUM CHLORIDE, SODIUM LACTATE AND CALCIUM CHLORIDE: 600; 310; 30; 20 INJECTION, SOLUTION INTRAVENOUS at 14:25

## 2017-09-14 RX ADMIN — DIAZEPAM 5 MG: 5 TABLET ORAL at 17:39

## 2017-09-14 RX ADMIN — FENTANYL CITRATE 50 MCG: 50 INJECTION, SOLUTION INTRAMUSCULAR; INTRAVENOUS at 16:18

## 2017-09-14 RX ADMIN — DEXAMETHASONE SODIUM PHOSPHATE 4 MG: 4 INJECTION, SOLUTION INTRA-ARTICULAR; INTRALESIONAL; INTRAMUSCULAR; INTRAVENOUS; SOFT TISSUE at 14:57

## 2017-09-14 RX ADMIN — FENTANYL CITRATE 50 MCG: 50 INJECTION, SOLUTION INTRAMUSCULAR; INTRAVENOUS at 16:10

## 2017-09-14 RX ADMIN — ONDANSETRON 4 MG: 2 INJECTION INTRAMUSCULAR; INTRAVENOUS at 17:38

## 2017-09-14 RX ADMIN — FENTANYL CITRATE 50 MCG: 50 INJECTION, SOLUTION INTRAMUSCULAR; INTRAVENOUS at 16:26

## 2017-09-14 RX ADMIN — PROPOFOL 180 MG: 10 INJECTION, EMULSION INTRAVENOUS at 14:35

## 2017-09-14 RX ADMIN — ROCURONIUM BROMIDE 50 MG: 10 INJECTION INTRAVENOUS at 14:35

## 2017-09-14 NOTE — BRIEF OP NOTE
[facility], [place]  Gastroenterology Brief Operative Note    Pre-operative diagnosis: Hilar and paraspinal mass   Post-operative diagnosis Same   Procedure: EGD, EUS   Surgeon: Matt Robles MD   Assistants(s): Yo Goins MD   Anesthesia: General endotracheal anesthesia   Estimated blood loss: Minimal    Total IV fluids: (See anesthesia record)   Blood transfusion: No transfusion was given during surgery   Total urine output: (See anesthesia record)   Drains: None   Specimens: Station 7 lymph node FNB   Implants: None   Findings: EGD: Changes consistent with gastric bypass. GE junction at 39 cm. GJ anastomosis at 45. Endoscopically normal otherwise.  EUS: Enlarged lymph node at station 7 posterior to the left atrium. FNB performed. Second enlarged lymph node at station 8.  Paravertebral mass seen on PET scan could not be visualized during EUS today.   Complications: None   Condition: Stable   Comments:      Recommendations:         See dictated procedure report for full details (found in chart review under 'Procedures')    - Observe in Same Day for possible discharge  - Await pathology results  - Follow up with Dr. Crawley in Oncology     Yo Goins MD  [phone]

## 2017-09-14 NOTE — IP AVS SNAPSHOT
MRN:[id number]                      After Visit Summary   9/14/2017    Tisha Arias    MRN: [id number]           Thank you!     Thank you for choosing [facility] for your care. Our goal is always to provide you with excellent care. Hearing back from our patients is one way we can continue to improve our services. Please take a few minutes to complete the written survey that you may receive in the mail after you visit with us. Thank you!        Patient Information     Date Of Birth          1960        About your hospital stay     You were admitted on:  September 14, 2017 You last received care in the:  Post Anesthesia Care Unit [facility]    You were discharged on:  September 14, 2017       Who to Call     For medical emergencies, please call 911.  For non-urgent questions about your medical care, please call your primary care provider or clinic, [phone]  For questions related to your surgery, please call your surgery clinic        Attending Provider     Provider Patrick Maradiaga MD Gastroenterology       Primary Care Provider Office Phone # Fax #    Shahla Crawley [phone]      After Care Instructions     Discharge Instructions       No driving or operating machinery until the day after procedure.            Discharge Instructions       Recommend that a responsible adult remain with the patient at home for 24 hours post discharge.            Discharge Instructions       Start with clear liquids, sips of water 1 hour after procedure. If no abdominal pain and gag reflex has returned, advance as tolerated to pre-procedure diet.              Discharge Instructions       Restart home medications.            Discharge Instructions       Check with your Provider when to start anticoagulant medication.            Discharge Instructions       No ALCOHOL 24 hours post procedure.                  Your next 10 appointments already scheduled     Sep  15, 2017  8:00 AM CDT   Procedure 4.5 hour with U2A ROOM 3   Unit 2A [facility] [place] ([facility])    [address] [address]               Sep 15, 2017  9:30 AM CDT   CT BONE BIOPSY DEEP with UUCT2   [facility] [place] ([facility])    [address] [address]   [phone]           Plan for an adult to drive you home and stay with you until morning.  Tell your doctor in advance:   If you have any allergies.   If you are breastfeeding or there s any chance you are pregnant.  Please bring any scans or X-rays taken at other hospitals, if they may be helpful. Also bring a list of your medicines, including vitamins, minerals and over-the-counter drugs. It is safest to leave valuables at home.  If you take blood thinners, you may need to stop taking them a few days before treatment. Talk to your doctor before stopping these medicines. You will need a blood test the morning of your exam.   Stop taking Coumadin (warfarin) 5 days before treatment. Restart the day after treatment.   If you take aspirin, you may need to stop taking it 3 days before treatment.   If you take Plavix, Ticlid, Pletal or Persantine, you may need to stop taking them 5 days before your scan.  If you have diabetes:   If you take insulin, call your diabetes care team. Ask if you should adjust your insulin before this test.   If your kidney function is normal, continue taking your metformin (Avandamet, Glucophage, Glucovance, Metaglip) on the day of your exam.   If your kidney function is abnormal, wait 48 hours before restarting this medicine.  If you have any questions, please call the imaging department where you will have your exam.  The day before your exam: Drink extra fluids at least six 8-ounce glasses (unless your doctor tells you to restrict fluids). The day of your exam: No eating or drinking for 4 hours before your  test. You may take medicine with small sips of water.            Oct 04, 2017  7:00 AM CDT   (Arrive by 6:45 AM)   Return Visit with Edu Benitez MD   [facility] ([facility])    [address] [place] [id number] [phone]            Nov 02, 2017  5:00 PM CDT   US HEAD NECK SOFT TISSUE with UCUS3   [facility] ([facility])    [address] [place] [address] [id number] [phone]           Please bring a list of your medicines (including vitamins, minerals and over-the-counter drugs). Also, tell your doctor about any allergies you may have. Wear comfortable clothes and leave your valuables at home.  You do not need to do anything special to prepare for your exam.  Please call the Imaging Department at your exam site with any questions.            Nov 02, 2017  6:00 PM CDT   LAB with  LAB   [facility] ([facility])    [address] [place] [address] [id number] [phone]           Patient must bring picture ID. Patient should be prepared to give a urine specimen  Please do not eat 10-12 hours before your appointment if you are coming in fasting for labs on lipids, cholesterol, or glucose (sugar). Pregnant women should follow their Care Team instructions. Water with medications is okay. Do not drink coffee or other fluids. If you have concerns about taking  your medications, please ask at office or if scheduling via [facility], send a message by clicking on Secure Messaging, Message Your Care Team.            Nov 13, 2017  4:30 PM CST   (Arrive by 4:15 PM)   RETURN ENDOCRINE with Avelina Castro MD   [facility] ([facility])    [address] [place] [address]  3rd [facility] [id number] [phone]            Nov 27, 2017  4:00 PM CST   (Arrive by 3:45 PM)   Return Visit with Shahla Crawley,  MD   [facility] ([facility])    [address]  2nd Floor  [facility] [address] [phone]              Further instructions from your care team       [facility] [place]  Same-Day Surgery   Adult Discharge Orders & Instructions     For 24 hours after surgery    1. Get plenty of rest.  A responsible adult must stay with you for at least 24 hours after you leave the hospital.   2. Do not drive or use heavy equipment.  If you have weakness or tingling, don't drive or use heavy equipment until this feeling goes away.  3. Do not drink alcohol.  4. Avoid strenuous or risky activities.  Ask for help when climbing stairs.   5. You may feel lightheaded.  IF so, sit for a few minutes before standing.  Have someone help you get up.   6. If you have nausea (feel sick to your stomach): Drink only clear liquids such as apple juice, ginger ale, broth or 7-Up.  Rest may also help.  Be sure to drink enough fluids.  Move to a regular diet as you feel able.  7. You may have a slight fever. Call the doctor if your fever is over 100 F (37.7 C) (taken under the tongue) or lasts longer than 24 hours.  8. You may have a dry mouth, a sore throat, muscle aches or trouble sleeping.  These should go away after 24 hours.  9. Do not make important or legal decisions.   Call your doctor for any of the followin.  Signs of infection (fever, growing tenderness at the surgery site, a large amount of drainage or bleeding, severe pain, foul-smelling drainage, redness, swelling).    2. It has been over 8 to 10 hours since surgery and you are still not able to urinate (pass water).    3.  Headache for over 24 hours.    4.  Numbness, tingling or weakness the day after surgery (if you had spinal anesthesia).  To contact a doctor, call ___Dr Robles's office at [phone]--Gastroenterology   or:     [phone] and ask for the resident on call for   GI  "(answered 24 hours a day)      Emergency Department:    [facility]: [phone]       (TTY for hearing impaired: [phone])            Additional Information     If you use hormonal birth control (such as the pill, patch, ring or implants): You'll need a second form of birth control for 7 days (condoms, a diaphragm or contraceptive foam). While in the hospital, you received a medicine called Bridion. Your normal birth control will not work as well for a week after taking this medicine.          Pending Results     No orders found from 9/12/2017 to 9/15/2017.            Admission Information     Date & Time Provider Department Dept. Phone    9/14/2017 Patrick Robles MD Post Anesthesia Care Unit [facility] [phone]      Your Vitals Were     Blood Pressure Temperature Respirations Height Weight Pulse Oximetry    132/97 99.4  F (37.4  C) (Oral) 16 1.651 m (5' 5\") 87.9 kg (193 lb 12.6 oz) 95%    BMI (Body Mass Index)                   32.25 kg/m2           ConferharDittit Information     [employer] gives you secure access to your electronic health record. If you see a primary care provider, you can also send messages to your care team and make appointments. If you have questions, please call your primary care clinic.  If you do not have a primary care provider, please call [phone] and they will assist you.        Care EveryWhere ID     This is your Care EveryWhere ID. This could be used by other organizations to access your [facility] medical records  [id number]        Equal Access to Services     RODRIGO ZAMORA : Hadii sacha johnsono Soesteban, waaxda luqadaha, qaybta kaalmada adeegyada, wax sulma yoder . So [facility] [phone].    ATENCIÓN: Si habla español, tiene a stiles disposición servicios gratuitos de asistencia lingüística. Llame al [phone].    We comply with applicable federal civil rights laws and [place] laws. We do not discriminate on the basis of race, color, " national origin, age, disability sex, sexual orientation or gender identity.               Review of your medicines      CONTINUE these medicines which have NOT CHANGED        Dose / Directions    ACAI PO   Used for:  Breast cancer, unspecified laterality, Thyroid cancer (H), Chronic arthralgias of knees and hips, unspecified laterality        Dose:  1000 mg   Take 1,000 mg by mouth 2 times daily   Refills:  0       aluminum chloride 20 % external solution   Commonly known as:  DRYSOL   Used for:  Rash, Intertrigo        Apply topically At Bedtime   Quantity:  60 mL   Refills:  3       AZMACORT IN        Dose:  2 puff   Inhale 2 puffs into the lungs as needed   Refills:  0       BENADRYL PO        Dose:  50 mg   Take 50 mg by mouth as needed   Refills:  0       BETAINE HCL PO        Dose:  2 tablet   Take 2 tablets by mouth as needed Betaine HCL and Pepsin: Take 1-7 tabs by mouth daily, if burning take one tablet less once daily. Betaine HCL 1.04 g Pepsin 40 mg   Refills:  0       calcitRIOL 0.25 MCG capsule   Commonly known as:  ROCALTROL   Used for:  Postsurgical hypothyroidism, Papillary carcinoma, follicular variant (H), Metastasis to cervical lymph node (H)        2 tabs in am and 1 tab qhs   Quantity:  270 capsule   Refills:  3       CALCIUM CITRATE +D 315-250 MG-UNIT Tabs per tablet   Generic drug:  calcium citrate-vitamin D        Dose:  2 tablet   Take 2 tablets by mouth 3 times daily   Quantity:  120 tablet   Refills:  0       celecoxib 200 MG capsule   Commonly known as:  celeBREX   Used for:  Chest wall pain        Dose:  200 mg   Take 1 capsule (200 mg) by mouth 2 times daily   Quantity:  180 capsule   Refills:  0       chlorhexidine 0.12 % solution   Commonly known as:  PERIDEX        Refills:  0       * Colchicine 0.6 MG Caps   Used for:  Costal chondritis        Dose:  0.6 mg   Take 0.6 mg by mouth 3 times daily for costochondritis (chest) discomfort. Scale back use to prn with loose stools or  bloating.   Quantity:  270 capsule   Refills:  1       * COLCRYS 0.6 MG tablet   Generic drug:  colchicine        Dose:  1.6 mg   Take 1.6 mg by mouth 3 times daily   Refills:  1       COMPOUNDED NON-CONTROLLED SUBSTANCE - PHARMACY TO MIX COMPOUNDED MEDICATION   Commonly known as:  CMPD RX   Used for:  Chest wall pain        Apply small amount to affected area two times daily.Ketamine 6% + ketoprofen 10% + lidocaine 10% in Lipo.   Quantity:  120 g   Refills:  6       cromolyn 4 % ophthalmic solution   Commonly known as:  OPTICROM   Used for:  Idiopathic mast cell activation syndrome (H)        Dose:  1 drop   Place 1 drop into both eyes 4 times daily   Quantity:  10 mL   Refills:  5       cyclobenzaprine 10 MG tablet   Commonly known as:  FLEXERIL        Dose:  10 mg   Take 10 mg by mouth 3 times daily as needed On hold Dr Monsalve   Refills:  5       dexamethasone 4 MG tablet   Commonly known as:  DECADRON   Used for:  Spine metastasis (H), Hilar adenopathy        Dose:  4 mg   Take 1 tablet (4 mg) by mouth 2 times daily (with meals)   Quantity:  10 tablet   Refills:  0       diazepam 5 MG tablet   Commonly known as:  VALIUM   Used for:  Chest wall pain        Dose:  5 mg   Take 1 tablet (5 mg) by mouth 3 times daily as needed For muscle spasms   Quantity:  90 tablet   Refills:  2       diclofenac 1 % Gel topical gel   Commonly known as:  VOLTAREN   Used for:  Myofascial pain        Apply affected area two times daily PRN using enclosed dosing card.   Quantity:  100 g   Refills:  1       docusate sodium 100 MG tablet   Commonly known as:  COLACE   Used for:  Acute constipation        Dose:  100 mg   Take 100 mg by mouth daily   Quantity:  60 tablet   Refills:  1       EPINEPHrine 0.3 MG/0.3ML injection 2-pack   Commonly known as:  EPIPEN 2-CARIDAD        Dose:  0.3 mg   Inject 0.3 mLs (0.3 mg) into the muscle once as needed for anaphylaxis   Quantity:  0.6 mL   Refills:  3       hydrochlorothiazide 12.5 MG capsule    Commonly known as:  MICROZIDE   Used for:  Hypocalcemia        Dose:  12.5 mg   Take 1 capsule (12.5 mg) by mouth daily   Quantity:  90 capsule   Refills:  2       HYDROcodone-acetaminophen 5-325 MG per tablet   Commonly known as:  NORCO        TK 1 T PO Q 4 TO 6 H PRN P   Refills:  0       levothyroxine 112 MCG tablet   Commonly known as:  SYNTHROID/LEVOTHROID   Used for:  Postsurgical hypothyroidism, Papillary carcinoma, follicular variant (H), Postsurgical hypoparathyroidism (H), Thyroid cancer (H)        Mon-Sat: (2 tabs daily) Sunday: 3 tabs   Quantity:  189 tablet   Refills:  3       lidocaine 5 % ointment   Commonly known as:  XYLOCAINE   Used for:  Chest wall pain        SANJANA TOPICALLY QID PRN   Quantity:  35.44 g   Refills:  3       Lidocaine HCl Monohydrate Powd        Refills:  0       MAGNESIUM CITRATE PO   Indication:  up to 3 times daily        Dose:  400 mg   Take 400 mg by mouth daily   Refills:  0       methocarbamol 750 MG tablet   Commonly known as:  ROBAXIN   Used for:  Myofascial pain        Dose:  750 mg   Take 1 tablet (750 mg) by mouth 4 times daily as needed . Ok to take a 5th Robaxin on very severe days.   Quantity:  360 tablet   Refills:  1       mometasone 110 MCG/INH inhaler   Commonly known as:  ASMANEX 30 METERED DOSES   Used for:  Intermittent asthma, uncomplicated        Dose:  1 puff   Inhale 1 puff into the lungs daily   Quantity:  3 Inhaler   Refills:  3       montelukast 10 MG tablet   Commonly known as:  SINGULAIR   Used for:  Idiopathic mast cell activation syndrome (H)        TAKE TWO TABLETS BY MOUTH TWICE DAILY   Quantity:  120 tablet   Refills:  11       * morphine 15 MG 12 hr tablet   Commonly known as:  MS CONTIN        Dose:  30 mg   30 mg At Bedtime   Refills:  0       * morphine 30 MG 12 hr tablet   Commonly known as:  MS CONTIN   Used for:  Chest wall pain        Dose:  30 mg   Take 1 tablet (30 mg) by mouth every 8 hours   Quantity:  90 tablet   Refills:  0        NASALCROM 5.2 MG/ACT Aers Inhaler   Used for:  Mast cell disease, systemic (H)   Generic drug:  cromolyn sodium        SPRAY ONE SPRAY( 1 ML) IN NOSTRIL DAILY   Quantity:  1 Bottle   Refills:  6       OMEGA 3 PO        Dose:  5 mL   Take 5 mLs by mouth   Refills:  0       ondansetron 4 MG ODT tab   Commonly known as:  ZOFRAN ODT   Used for:  Nausea with vomiting        Dose:  4-8 mg   Take 1-2 tablets (4-8 mg) by mouth every 8 hours as needed for nausea or vomiting   Quantity:  60 tablet   Refills:  6       * order for DME   Used for:  Venous stasis        Equipment being ordered: compression stockings. 20-30 mm or 30 - 40 mm as patient can tolerate. Physical therapy to determine if they should be above or below the knee.   Quantity:  2 Units   Refills:  4       * order for DME   Used for:  Malignant neoplasm of right female breast, unspecified site of breast        Equipment being ordered: compression bra   Quantity:  2 Device   Refills:  1       oxyCODONE 10 MG IR tablet   Commonly known as:  ROXICODONE   Used for:  Neoplasm related pain        Dose:  15-20 mg   Take 1.5-2 tablets (15-20 mg) by mouth every 4 hours as needed   Quantity:  180 tablet   Refills:  0       polyethylene glycol powder   Commonly known as:  MIRALAX   Used for:  Acute constipation        Dose:  1 capful   Take 17 g by mouth daily   Quantity:  510 g   Refills:  1       potassium chloride SA 20 MEQ CR tablet   Commonly known as:  potassium chloride   Used for:  Hypokalemia        Dose:  20 mEq   Take 1 tablet (20 mEq) by mouth daily   Quantity:  90 tablet   Refills:  3       PROBIOTIC DAILY PO   Used for:  Breast cancer, unspecified laterality, Thyroid cancer (H), Chronic arthralgias of knees and hips, unspecified laterality        Dose:  1 capsule   Take 1 capsule by mouth daily Lacto acid bifidobacterium   Refills:  0       ranitidine 75 MG tablet   Commonly known as:  ZANTAC   Used for:  Mast cell disease, systemic (H)        Dose:  75 mg    Take 1 tablet (75 mg) by mouth 3 times daily   Quantity:  270 tablet   Refills:  3       SUMAtriptan 50 MG tablet   Commonly known as:  IMITREX   Used for:  Migraine without status migrainosus, not intractable, unspecified migraine type        Dose:  50 mg   Take 1 tablet (50 mg) by mouth at onset of headache for migraine (may repeat in 2 hours as needed, max 2 tablets daily)   Quantity:  5 tablet   Refills:  3       tacrolimus 0.1 % ointment   Commonly known as:  PROTOPIC   Used for:  Rash, Intertrigo        Apply topically 2 times daily   Quantity:  60 g   Refills:  3       tamoxifen 20 MG tablet   Commonly known as:  NOLVADEX   Used for:  Malignant neoplasm of female breast, unspecified laterality, unspecified site of breast        Dose:  20 mg   Take 1 tablet (20 mg) by mouth daily   Quantity:  90 tablet   Refills:  3       triamcinolone 0.025 % ointment   Commonly known as:  KENALOG   Used for:  Intertrigo, Rash        Apply topically 2 times daily Mix with 1 lb jar of vaseline or aquaphor   Quantity:  80 g   Refills:  3       TUMS 500 MG chewable tablet   Generic drug:  calcium carbonate        Dose:  1.5 chew tab   Take 2 tablets (1,000 mg) by mouth nightly as needed for other (cramps)   Quantity:  180 chew tab   Refills:  3       * UNABLE TO FIND        Dose:  1 tablet   1 tablet 3 times daily MEDICATION NAME: calcium D-Glucarate   Refills:  0       * UNABLE TO FIND   Indication:  On hold for drug testing   Used for:  Breast cancer, unspecified laterality, Papillary carcinoma, follicular variant (H), Chronic musculoskeletal pain        Dose:  2 tablet   2 tablets 3 times daily MEDICATION NAME: Natural D-Hist   Refills:  0       * UNABLE TO FIND        MEDICATION NAME: Tumeric 3 capsules daily   Refills:  0       * UNABLE TO FIND   Used for:  Thyroid cancer (H), Postsurgical hypothyroidism, Postsurgical hypoparathyroidism (H)        Dose:  1 tablet   1 tablet 3 times daily MEDICATION NAME: Digestzymes    Refills:  0       * UNABLE TO FIND   Indication:  P5P50   Used for:  Thyroid cancer (H), Postsurgical hypothyroidism, Postsurgical hypoparathyroidism (H)        Dose:  1 tablet   1 tablet daily MEDICATION NAME: Pure Encapsulations   Refills:  0       VENTOLIN  (90 BASE) MCG/ACT Inhaler   Used for:  Mild intermittent asthma without complication   Generic drug:  albuterol        INHALE 2 PUFFS INTO THE LUNGS EVERY 4 HOURS AS NEEDED FOR SHORTNESS OF BREATH OR DIFFICULT BREATHING OR WHEEZING   Quantity:  18 g   Refills:  3       vitamin D3 2000 UNITS Caps   Used for:  Thyroid cancer (H), Postsurgical hypothyroidism, Papillary carcinoma, follicular variant (H), Postsurgical hypoparathyroidism (H)        Dose:  86515 Units   Take 10,000 Units by mouth daily   Quantity:  60 capsule   Refills:  4       ZYRTEC ALLERGY 10 MG tablet   Generic drug:  cetirizine        Dose:  10 mg   Take 1 tablet (10 mg) by mouth 3 times daily On hold for lab test.   Quantity:  90 tablet   Refills:  6       * Notice:  This list has 11 medication(s) that are the same as other medications prescribed for you. Read the directions carefully, and ask your doctor or other care provider to review them with you.             Protect others around you: Learn how to safely use, store and throw away your medicines at www.disposemymeds.org.             Medication List: This is a list of all your medications and when to take them. Check marks below indicate your daily home schedule. Keep this list as a reference.      Medications           Morning Afternoon Evening Bedtime As Needed    ACAI PO   Take 1,000 mg by mouth 2 times daily                                aluminum chloride 20 % external solution   Commonly known as:  DRYSOL   Apply topically At Bedtime                                AZMACORT IN   Inhale 2 puffs into the lungs as needed                                BENADRYL PO   Take 50 mg by mouth as needed                                 BETAINE HCL PO   Take 2 tablets by mouth as needed Betaine HCL and Pepsin: Take 1-7 tabs by mouth daily, if burning take one tablet less once daily. Betaine HCL 1.04 g Pepsin 40 mg                                calcitRIOL 0.25 MCG capsule   Commonly known as:  ROCALTROL   2 tabs in am and 1 tab qhs                                CALCIUM CITRATE +D 315-250 MG-UNIT Tabs per tablet   Take 2 tablets by mouth 3 times daily   Generic drug:  calcium citrate-vitamin D                                celecoxib 200 MG capsule   Commonly known as:  celeBREX   Take 1 capsule (200 mg) by mouth 2 times daily                                chlorhexidine 0.12 % solution   Commonly known as:  PERIDEX                                * Colchicine 0.6 MG Caps   Take 0.6 mg by mouth 3 times daily for costochondritis (chest) discomfort. Scale back use to prn with loose stools or bloating.                                * COLCRYS 0.6 MG tablet   Take 1.6 mg by mouth 3 times daily   Generic drug:  colchicine                                COMPOUNDED NON-CONTROLLED SUBSTANCE - PHARMACY TO MIX COMPOUNDED MEDICATION   Commonly known as:  CMPD RX   Apply small amount to affected area two times daily.Ketamine 6% + ketoprofen 10% + lidocaine 10% in Lipo.                                cromolyn 4 % ophthalmic solution   Commonly known as:  OPTICROM   Place 1 drop into both eyes 4 times daily                                cyclobenzaprine 10 MG tablet   Commonly known as:  FLEXERIL   Take 10 mg by mouth 3 times daily as needed On hold Dr Monsalve                                dexamethasone 4 MG tablet   Commonly known as:  DECADRON   Take 1 tablet (4 mg) by mouth 2 times daily (with meals)                                diazepam 5 MG tablet   Commonly known as:  VALIUM   Take 1 tablet (5 mg) by mouth 3 times daily as needed For muscle spasms                                diclofenac 1 % Gel topical gel   Commonly known as:  VOLTAREN   Apply affected  area two times daily PRN using enclosed dosing card.                                docusate sodium 100 MG tablet   Commonly known as:  COLACE   Take 100 mg by mouth daily                                EPINEPHrine 0.3 MG/0.3ML injection 2-pack   Commonly known as:  EPIPEN 2-CARIDAD   Inject 0.3 mLs (0.3 mg) into the muscle once as needed for anaphylaxis                                hydrochlorothiazide 12.5 MG capsule   Commonly known as:  MICROZIDE   Take 1 capsule (12.5 mg) by mouth daily                                HYDROcodone-acetaminophen 5-325 MG per tablet   Commonly known as:  NORCO   TK 1 T PO Q 4 TO 6 H PRN P                                levothyroxine 112 MCG tablet   Commonly known as:  SYNTHROID/LEVOTHROID   Mon-Sat: (2 tabs daily) Sunday: 3 tabs                                lidocaine 5 % ointment   Commonly known as:  XYLOCAINE   SANJANA TOPICALLY QID PRN                                Lidocaine HCl Monohydrate Powd                                MAGNESIUM CITRATE PO   Take 400 mg by mouth daily                                methocarbamol 750 MG tablet   Commonly known as:  ROBAXIN   Take 1 tablet (750 mg) by mouth 4 times daily as needed . Ok to take a 5th Robaxin on very severe days.                                mometasone 110 MCG/INH inhaler   Commonly known as:  ASMANEX 30 METERED DOSES   Inhale 1 puff into the lungs daily                                montelukast 10 MG tablet   Commonly known as:  SINGULAIR   TAKE TWO TABLETS BY MOUTH TWICE DAILY                                * morphine 15 MG 12 hr tablet   Commonly known as:  MS CONTIN   30 mg At Bedtime                                * morphine 30 MG 12 hr tablet   Commonly known as:  MS CONTIN   Take 1 tablet (30 mg) by mouth every 8 hours                                NASALCROM 5.2 MG/ACT Aers Inhaler   SPRAY ONE SPRAY( 1 ML) IN NOSTRIL DAILY   Generic drug:  cromolyn sodium                                OMEGA 3 PO   Take 5 mLs by mouth                                 ondansetron 4 MG ODT tab   Commonly known as:  ZOFRAN ODT   Take 1-2 tablets (4-8 mg) by mouth every 8 hours as needed for nausea or vomiting                                * order for DME   Equipment being ordered: compression stockings. 20-30 mm or 30 - 40 mm as patient can tolerate. Physical therapy to determine if they should be above or below the knee.                                * order for DME   Equipment being ordered: compression bra                                oxyCODONE 10 MG IR tablet   Commonly known as:  ROXICODONE   Take 1.5-2 tablets (15-20 mg) by mouth every 4 hours as needed                                polyethylene glycol powder   Commonly known as:  MIRALAX   Take 17 g by mouth daily                                potassium chloride SA 20 MEQ CR tablet   Commonly known as:  potassium chloride   Take 1 tablet (20 mEq) by mouth daily                                PROBIOTIC DAILY PO   Take 1 capsule by mouth daily Lacto acid bifidobacterium                                ranitidine 75 MG tablet   Commonly known as:  ZANTAC   Take 1 tablet (75 mg) by mouth 3 times daily                                SUMAtriptan 50 MG tablet   Commonly known as:  IMITREX   Take 1 tablet (50 mg) by mouth at onset of headache for migraine (may repeat in 2 hours as needed, max 2 tablets daily)                                tacrolimus 0.1 % ointment   Commonly known as:  PROTOPIC   Apply topically 2 times daily                                tamoxifen 20 MG tablet   Commonly known as:  NOLVADEX   Take 1 tablet (20 mg) by mouth daily                                triamcinolone 0.025 % ointment   Commonly known as:  KENALOG   Apply topically 2 times daily Mix with 1 lb jar of vaseline or aquaphor                                TUMS 500 MG chewable tablet   Take 2 tablets (1,000 mg) by mouth nightly as needed for other (cramps)   Generic drug:  calcium carbonate                                 * UNABLE TO FIND   1 tablet 3 times daily MEDICATION NAME: calcium D-Glucarate                                * UNABLE TO FIND   2 tablets 3 times daily MEDICATION NAME: Natural D-Hist                                * UNABLE TO FIND   MEDICATION NAME: Tumeric 3 capsules daily                                * UNABLE TO FIND   1 tablet 3 times daily MEDICATION NAME: Digestzymes                                * UNABLE TO FIND   1 tablet daily MEDICATION NAME: Pure Encapsulations                                VENTOLIN  (90 BASE) MCG/ACT Inhaler   INHALE 2 PUFFS INTO THE LUNGS EVERY 4 HOURS AS NEEDED FOR SHORTNESS OF BREATH OR DIFFICULT BREATHING OR WHEEZING   Generic drug:  albuterol                                vitamin D3 2000 UNITS Caps   Take 10,000 Units by mouth daily                                ZYRTEC ALLERGY 10 MG tablet   Take 1 tablet (10 mg) by mouth 3 times daily On hold for lab test.   Generic drug:  cetirizine                                * Notice:  This list has 11 medication(s) that are the same as other medications prescribed for you. Read the directions carefully, and ask your doctor or other care provider to review them with you.

## 2017-09-14 NOTE — OR NURSING
Notified Dr. Mcduffie that pt takes a lot of pain medication at home and asked if IV pain meds could be given more frequently then initially ordered. MD said it is ok to change dilaudid order to q10 min instead of q15.

## 2017-09-14 NOTE — TELEPHONE ENCOUNTER
PRIOR AUTHORIZATION DENIED    Medication: FV-Ketamine 6%, Lidocaine 10% ketoprofen 10 % in PLO- Denied    Denial Date: 9/14/2017    Denial Rational: Ketoprofen Powder is not covered, it is a plan exclusion:         Appeal Information:     [facility] Phone: [phone], fax: [phone]

## 2017-09-14 NOTE — IP AVS SNAPSHOT
Post Anesthesia Care Unit [address] [address]    Phone:  [phone]                                       After Visit Summary   9/14/2017    Tisha Arias    MRN: [id number]           After Visit Summary Signature Page     I have received my discharge instructions, and my questions have been answered. I have discussed any challenges I see with this plan with the nurse or doctor.    ..........................................................................................................................................  Patient/Patient Representative Signature      ..........................................................................................................................................  Patient Representative Print Name and Relationship to Patient    ..................................................               ................................................  Date                                            Time    ..........................................................................................................................................  Reviewed by Signature/Title    ...................................................              ..............................................  Date                                                            Time

## 2017-09-14 NOTE — TELEPHONE ENCOUNTER
"Oncology Distress Screening Follow-up  Clinical Social Work  [facility]    Identified Concern and Score From Distress Screening: Patient triggered oncology distress screening for concerns regarding anxiety (10/10), work and home life issues affected by your cancer (10/10) and knowing what resources are available to help you (10/10)    Date of Distress Screenin2017    Data: Patient is a 56 year old female with breast cancer    Intervention: Social work contacted patient by phone regarding result of oncology distress screening.  When given education about social work services and purpose of call, patient indicated \"Why are you calling me? I said I didn't want to be contacted by a .\" Per review of screening results, patient requested follow up referral from oncology dietitian.  Social work explained automatic outreach of services occurs on questions triggered by a score of 6 or above and offered assistance with needs. Patient stated \"I don't know what you can help me with.\" Social work offered contact information for any other needs, questions or concerns which patient accepted.    Education Provided: Education regarding social work role.    Follow-up Required: Social work is available to assist with any identified needs.      Soo Yeon Han LICSW  2017  Pager: [phone]  Phone Number: [phone]    "

## 2017-09-14 NOTE — ANESTHESIA POSTPROCEDURE EVALUATION
Patient: Tisha Arias    Procedure(s):  Esophagogastroduodenoscopy, Endoscopic Ultrasound, with fine needle biopsy aspirate - Wound Class: II-Clean Contaminated    Diagnosis:Breast Cancer  Diagnosis Additional Information: No value filed.    Anesthesia Type:  MAC    Note:  Anesthesia Post Evaluation    Patient location during evaluation: PACU  Patient participation: Able to fully participate in evaluation  Level of consciousness: awake and alert  Pain management: satisfactory to patient  Airway patency: patent  Cardiovascular status: acceptable and stable  Respiratory status: acceptable and spontaneous ventilation  Hydration status: euvolemic  PONV: controlled     Anesthetic complications: None          Last vitals:  Vitals:    09/14/17 1600 09/14/17 1615 09/14/17 1630   BP: 169/84 137/79 135/77   Resp: 16 16 16   Temp:  36.8  C (98.2  F)    SpO2: 100% 99% 100%         Electronically Signed By: Dane Mcduffie MD  September 14, 2017  4:41 PM

## 2017-09-14 NOTE — DISCHARGE INSTRUCTIONS
[facility]  Same-Day Surgery   Adult Discharge Orders & Instructions     For 24 hours after surgery    1. Get plenty of rest.  A responsible adult must stay with you for at least 24 hours after you leave the hospital.   2. Do not drive or use heavy equipment.  If you have weakness or tingling, don't drive or use heavy equipment until this feeling goes away.  3. Do not drink alcohol.  4. Avoid strenuous or risky activities.  Ask for help when climbing stairs.   5. You may feel lightheaded.  IF so, sit for a few minutes before standing.  Have someone help you get up.   6. If you have nausea (feel sick to your stomach): Drink only clear liquids such as apple juice, ginger ale, broth or 7-Up.  Rest may also help.  Be sure to drink enough fluids.  Move to a regular diet as you feel able.  7. You may have a slight fever. Call the doctor if your fever is over 100 F (37.7 C) (taken under the tongue) or lasts longer than 24 hours.  8. You may have a dry mouth, a sore throat, muscle aches or trouble sleeping.  These should go away after 24 hours.  9. Do not make important or legal decisions.   Call your doctor for any of the followin.  Signs of infection (fever, growing tenderness at the surgery site, a large amount of drainage or bleeding, severe pain, foul-smelling drainage, redness, swelling).    2. It has been over 8 to 10 hours since surgery and you are still not able to urinate (pass water).    3.  Headache for over 24 hours.    4.  Numbness, tingling or weakness the day after surgery (if you had spinal anesthesia).  To contact a doctor, call ___Dr Robles's office at [phone]--Gastroenterology   or:     [phone] and ask for the resident on call for   GI (answered 24 hours a day)      Emergency Department:    [facility]: [phone]       (TTY for hearing impaired: [phone])

## 2017-09-14 NOTE — ANESTHESIA CARE TRANSFER NOTE
Patient: Tisha Arias    Procedure(s):  Esophagogastroduodenoscopy, Endoscopic Ultrasound, with fine needle biopsy aspirate - Wound Class: II-Clean Contaminated    Diagnosis: Breast Cancer  Diagnosis Additional Information: No value filed.    Anesthesia Type:   MAC     Note:  Airway :Nasal Cannula  Patient transferred to:PACU        Vitals: (Last set prior to Anesthesia Care Transfer)    CRNA VITALS  9/14/2017 1514 - 9/14/2017 1558      9/14/2017             Pulse: 112    Ht Rate: 112    SpO2: 96 %    Resp Rate (observed): 14                Electronically Signed By: DENZEL Lange CRNA  September 14, 2017  3:58 PM

## 2017-09-15 ENCOUNTER — TELEPHONE (OUTPATIENT)
Dept: PALLIATIVE CARE | Facility: CLINIC | Age: 57
End: 2017-09-15

## 2017-09-15 ENCOUNTER — HOSPITAL ENCOUNTER (OUTPATIENT)
Facility: CLINIC | Age: 57
Discharge: HOME | End: 2017-09-15
Attending: INTERNAL MEDICINE | Admitting: INTERNAL MEDICINE
Payer: COMMERCIAL

## 2017-09-15 ENCOUNTER — APPOINTMENT (OUTPATIENT)
Dept: MEDSURG UNIT | Facility: CLINIC | Age: 57
End: 2017-09-15
Attending: INTERNAL MEDICINE
Payer: COMMERCIAL

## 2017-09-15 ENCOUNTER — HOSPITAL ENCOUNTER (OUTPATIENT)
Dept: CT IMAGING | Facility: CLINIC | Age: 57
End: 2017-09-15
Attending: CLINICAL NURSE SPECIALIST | Admitting: INTERNAL MEDICINE
Payer: COMMERCIAL

## 2017-09-15 VITALS
OXYGEN SATURATION: 95 % | BODY MASS INDEX: 32.29 KG/M2 | RESPIRATION RATE: 16 BRPM | WEIGHT: 193.78 LBS | DIASTOLIC BLOOD PRESSURE: 70 MMHG | HEART RATE: 70 BPM | SYSTOLIC BLOOD PRESSURE: 107 MMHG | HEIGHT: 65 IN | TEMPERATURE: 98.6 F

## 2017-09-15 VITALS
RESPIRATION RATE: 12 BRPM | OXYGEN SATURATION: 100 % | DIASTOLIC BLOOD PRESSURE: 86 MMHG | SYSTOLIC BLOOD PRESSURE: 142 MMHG

## 2017-09-15 DIAGNOSIS — M89.9 BONE LESION: ICD-10-CM

## 2017-09-15 LAB — UPPER EUS: NORMAL

## 2017-09-15 PROCEDURE — 77012 CT SCAN FOR NEEDLE BIOPSY: CPT

## 2017-09-15 PROCEDURE — 99153 MOD SED SAME PHYS/QHP EA: CPT

## 2017-09-15 PROCEDURE — 25000128 H RX IP 250 OP 636: Performed by: RADIOLOGY

## 2017-09-15 PROCEDURE — 25000128 H RX IP 250 OP 636: Performed by: PHYSICIAN ASSISTANT

## 2017-09-15 PROCEDURE — 88271 CYTOGENETICS DNA PROBE: CPT | Performed by: INTERNAL MEDICINE

## 2017-09-15 PROCEDURE — 40000166 ZZH STATISTIC PP CARE STAGE 1

## 2017-09-15 PROCEDURE — 88275 CYTOGENETICS 100-300: CPT | Mod: 91 | Performed by: INTERNAL MEDICINE

## 2017-09-15 PROCEDURE — 88342 IMHCHEM/IMCYTCHM 1ST ANTB: CPT | Performed by: INTERNAL MEDICINE

## 2017-09-15 PROCEDURE — 27210791

## 2017-09-15 PROCEDURE — 25000128 H RX IP 250 OP 636

## 2017-09-15 PROCEDURE — 88333 PATH CONSLTJ SURG CYTO XM 1: CPT | Performed by: INTERNAL MEDICINE

## 2017-09-15 PROCEDURE — 27210909 ZZH NEEDLE CR5

## 2017-09-15 PROCEDURE — 27210903 CT BONE BIOPSY DEEP

## 2017-09-15 PROCEDURE — 00000159 ZZHCL STATISTIC H-SEND OUTS PREP: Performed by: INTERNAL MEDICINE

## 2017-09-15 PROCEDURE — 88305 TISSUE EXAM BY PATHOLOGIST: CPT | Performed by: INTERNAL MEDICINE

## 2017-09-15 PROCEDURE — 88365 INSITU HYBRIDIZATION (FISH): CPT | Performed by: INTERNAL MEDICINE

## 2017-09-15 PROCEDURE — 99152 MOD SED SAME PHYS/QHP 5/>YRS: CPT

## 2017-09-15 PROCEDURE — 88341 IMHCHEM/IMCYTCHM EA ADD ANTB: CPT | Performed by: INTERNAL MEDICINE

## 2017-09-15 RX ORDER — LIDOCAINE 40 MG/G
CREAM TOPICAL
Status: DISCONTINUED | OUTPATIENT
Start: 2017-09-15 | End: 2017-09-15 | Stop reason: HOSPADM

## 2017-09-15 RX ORDER — FLUMAZENIL 0.1 MG/ML
0.2 INJECTION, SOLUTION INTRAVENOUS
Status: DISCONTINUED | OUTPATIENT
Start: 2017-09-15 | End: 2017-09-15 | Stop reason: HOSPADM

## 2017-09-15 RX ORDER — SODIUM CHLORIDE 9 MG/ML
INJECTION, SOLUTION INTRAVENOUS CONTINUOUS
Status: DISCONTINUED | OUTPATIENT
Start: 2017-09-15 | End: 2017-09-15 | Stop reason: HOSPADM

## 2017-09-15 RX ORDER — NALOXONE HYDROCHLORIDE 0.4 MG/ML
.1-.4 INJECTION, SOLUTION INTRAMUSCULAR; INTRAVENOUS; SUBCUTANEOUS
Status: DISCONTINUED | OUTPATIENT
Start: 2017-09-15 | End: 2017-09-15 | Stop reason: HOSPADM

## 2017-09-15 RX ORDER — ONDANSETRON 2 MG/ML
4 INJECTION INTRAMUSCULAR; INTRAVENOUS ONCE
Status: COMPLETED | OUTPATIENT
Start: 2017-09-15 | End: 2017-09-15

## 2017-09-15 RX ORDER — FENTANYL CITRATE 50 UG/ML
25-50 INJECTION, SOLUTION INTRAMUSCULAR; INTRAVENOUS EVERY 5 MIN PRN
Status: DISCONTINUED | OUTPATIENT
Start: 2017-09-15 | End: 2017-09-15 | Stop reason: HOSPADM

## 2017-09-15 RX ADMIN — SODIUM CHLORIDE: 9 INJECTION, SOLUTION INTRAVENOUS at 08:37

## 2017-09-15 RX ADMIN — FENTANYL CITRATE 200 MCG: 50 INJECTION, SOLUTION INTRAMUSCULAR; INTRAVENOUS at 10:53

## 2017-09-15 RX ADMIN — ONDANSETRON 4 MG: 2 INJECTION INTRAMUSCULAR; INTRAVENOUS at 10:50

## 2017-09-15 RX ADMIN — MIDAZOLAM 4 MG: 1 INJECTION INTRAMUSCULAR; INTRAVENOUS at 10:53

## 2017-09-15 NOTE — PROGRESS NOTES
Pt arrives to 2a, with spouse, for bone biopsy. Pre procedure assessment completed. H&P is up to date. Consent has been signed. PIV placed, labs are up to date.

## 2017-09-15 NOTE — TELEPHONE ENCOUNTER
Received VM from patient yesterday evening late. She was requesting a refill of her methocarbamol. She states she is totally out of it right now and will pay out of pocket if she has to. She requests this be sent to [facility].    Reviewed Epic. Last script was dated 7/22/2017, #360 tabs/90 day supply + 1 refill.    Called [facility]. The pharmacist confirms they filled and she picked up #360 tabs on 7/22/2017. Following this fill patient transferred her script to [facility] (due to insurance reasons).    Called [facility]. Was informed on 7/25/2017 they filled and patient picked up addition #120 tabs for a 28 day supply. They confirm they have the remaining #240 tabs on file at this time. Per pharmacy tech, patient's insurance would pay for a refill in a few days.     Patient received total of #480 tabs end of July - this should have been a 4 month supply based on script instructions.     Patient unreachable today as in hospital for biopsy procedure.     Will send to MD for review.

## 2017-09-15 NOTE — DISCHARGE INSTRUCTIONS
[facility]    Interventional Radiology  Patient Instructions Following Biopsy    AFTER YOU GO HOME  ? If you were given sedation DO NOT drive or operate machinery at home or at work for at least 24 hours  ? DO relax and take it easy for 48 hours, no strenuous activity for 24 hours  ? DO drink plenty of fluids  ? DO resume your regular diet, unless otherwise instructed by your Primary Physician  ? Keep the dressing dry and in place for 24 hours.  ? DO NOT SMOKE FOR AT LEAST 24 HOURS, if you have been given any medications that were to help you relax or sedate you during your procedure  ? DO NOT drink alcoholic beverages the day of your procedure  ? DO NOT do any strenuous exercise or lifting (> 10 lbs) for at least 3 days following your procedure  ? DO NOT take a bath or shower for at least 12 hours following your procedure  ? Remove dressing after shower the next day. Replace with Band aid for 2 days.  Never leave a wet dressing in place.  ? DO NOT make any important or legal decisions for 24 hours following your procedure  ? There should be minimum drainage from the biopsy site    CALL THE PHYSICIAN IF:  ? You start bleeding from the procedure site.  If you do start to bleed from that site, lie down flat and hold pressure on the site for a minimum of 10 minutes.  Your physician will tell you if you need to return to the hospital  ? You develop nausea or vomiting  ? You have excessive swelling, redness, or tenderness at the site  ? You have drainage that looks like it is infected.  ? You experience severe pain  ? You develop hives or a rash or unexplained itching  ? You develop shortness of breath  ? You develop a temperature of 101 degrees F or greater    ADDITIONAL INSTRUCTIONS  If you are taking Coumadin, restart tonight.  Follow up with your Coumadin Clinic or Primary Care MD to have your INR rechecked.    [facility] INTERVENTIONAL RADIOLOGY DEPARTMENT  Procedure Physician: Dr Blevins                                        Date of procedure: September 15, 2017  Telephone Numbers: [phone] Monday-Friday 8:00 am to 4:30 pm  [phone] After 4:30 pm Monday-Friday, Weekends & Holidays. Ask for the Interventional Radiologist on call.  Someone is on call 24 hrs/day  [facility] toll free number: [phone] Monday-Friday 8:00 am to 4:30 pm  [facility] Emergency Dept: [phone]

## 2017-09-15 NOTE — TELEPHONE ENCOUNTER
No early refill. Advised patient of this. Advised patient she is welcome to discuss with pharmacy/insurance but MD will not authorize early fill. She verbalized understanding.

## 2017-09-15 NOTE — PROGRESS NOTES
Pt has returned to unit 2a s/p bone biopsy. Mid back site CDI. Pt reports incision site is sore, pt lying comfortably on her left side. VSS. Pt tolerating PO. Spouse at bedside.

## 2017-09-15 NOTE — IP AVS SNAPSHOT
MRN:[id number]                      After Visit Summary   9/15/2017    Tisha Arias    MRN: [id number]           Visit Information        Department      9/15/2017  7:53 AM [address] [facility] [place]          Review of your medicines      UNREVIEWED medicines. Ask your doctor about these medicines        Dose / Directions    ACAI PO   Used for:  Breast cancer, unspecified laterality, Thyroid cancer (H), Chronic arthralgias of knees and hips, unspecified laterality        Dose:  1000 mg   Take 1,000 mg by mouth 2 times daily   Refills:  0       aluminum chloride 20 % external solution   Commonly known as:  DRYSOL   Used for:  Rash, Intertrigo        Apply topically At Bedtime   Quantity:  60 mL   Refills:  3       AZMACORT IN        Dose:  2 puff   Inhale 2 puffs into the lungs as needed   Refills:  0       BENADRYL PO        Dose:  50 mg   Take 50 mg by mouth as needed   Refills:  0       BETAINE HCL PO        Dose:  2 tablet   Take 2 tablets by mouth as needed Betaine HCL and Pepsin: Take 1-7 tabs by mouth daily, if burning take one tablet less once daily. Betaine HCL 1.04 g Pepsin 40 mg   Refills:  0       calcitRIOL 0.25 MCG capsule   Commonly known as:  ROCALTROL   Used for:  Postsurgical hypothyroidism, Papillary carcinoma, follicular variant (H), Metastasis to cervical lymph node (H)        2 tabs in am and 1 tab qhs   Quantity:  270 capsule   Refills:  3       CALCIUM CITRATE +D 315-250 MG-UNIT Tabs per tablet   Generic drug:  calcium citrate-vitamin D        Dose:  2 tablet   Take 2 tablets by mouth 3 times daily   Quantity:  120 tablet   Refills:  0       celecoxib 200 MG capsule   Commonly known as:  celeBREX   Used for:  Chest wall pain        Dose:  200 mg   Take 1 capsule (200 mg) by mouth 2 times daily   Quantity:  180 capsule   Refills:  0       chlorhexidine 0.12 % solution   Commonly known as:  PERIDEX        Refills:  0       * Colchicine 0.6 MG Caps   Used for:  Costal  chondritis        Dose:  0.6 mg   Take 0.6 mg by mouth 3 times daily for costochondritis (chest) discomfort. Scale back use to prn with loose stools or bloating.   Quantity:  270 capsule   Refills:  1       * COLCRYS 0.6 MG tablet   Generic drug:  colchicine        Dose:  1.6 mg   Take 1.6 mg by mouth 3 times daily   Refills:  1       COMPOUNDED NON-CONTROLLED SUBSTANCE - PHARMACY TO MIX COMPOUNDED MEDICATION   Commonly known as:  CMPD RX   Used for:  Chest wall pain        Apply small amount to affected area two times daily.Ketamine 6% + ketoprofen 10% + lidocaine 10% in Lipo.   Quantity:  120 g   Refills:  6       cromolyn 4 % ophthalmic solution   Commonly known as:  OPTICROM   Used for:  Idiopathic mast cell activation syndrome (H)        Dose:  1 drop   Place 1 drop into both eyes 4 times daily   Quantity:  10 mL   Refills:  5       cyclobenzaprine 10 MG tablet   Commonly known as:  FLEXERIL        Dose:  10 mg   Take 10 mg by mouth 3 times daily as needed On hold Dr Gricel   Refills:  5       dexamethasone 4 MG tablet   Commonly known as:  DECADRON   Used for:  Spine metastasis (H), Hilar adenopathy        Dose:  4 mg   Take 1 tablet (4 mg) by mouth 2 times daily (with meals)   Quantity:  10 tablet   Refills:  0       diazepam 5 MG tablet   Commonly known as:  VALIUM   Used for:  Chest wall pain        Dose:  5 mg   Take 1 tablet (5 mg) by mouth 3 times daily as needed For muscle spasms   Quantity:  90 tablet   Refills:  2       diclofenac 1 % Gel topical gel   Commonly known as:  VOLTAREN   Used for:  Myofascial pain        Apply affected area two times daily PRN using enclosed dosing card.   Quantity:  100 g   Refills:  1       docusate sodium 100 MG tablet   Commonly known as:  COLACE   Used for:  Acute constipation        Dose:  100 mg   Take 100 mg by mouth daily   Quantity:  60 tablet   Refills:  1       EPINEPHrine 0.3 MG/0.3ML injection 2-pack   Commonly known as:  EPIPEN 2-CARIDAD        Dose:  0.3 mg    Inject 0.3 mLs (0.3 mg) into the muscle once as needed for anaphylaxis   Quantity:  0.6 mL   Refills:  3       hydrochlorothiazide 12.5 MG capsule   Commonly known as:  MICROZIDE   Used for:  Hypocalcemia        Dose:  12.5 mg   Take 1 capsule (12.5 mg) by mouth daily   Quantity:  90 capsule   Refills:  2       HYDROcodone-acetaminophen 5-325 MG per tablet   Commonly known as:  NORCO        TK 1 T PO Q 4 TO 6 H PRN P   Refills:  0       levothyroxine 112 MCG tablet   Commonly known as:  SYNTHROID/LEVOTHROID   Used for:  Postsurgical hypothyroidism, Papillary carcinoma, follicular variant (H), Postsurgical hypoparathyroidism (H), Thyroid cancer (H)        Mon-Sat: (2 tabs daily) Sunday: 3 tabs   Quantity:  189 tablet   Refills:  3       lidocaine 5 % ointment   Commonly known as:  XYLOCAINE   Used for:  Chest wall pain        SANJANA TOPICALLY QID PRN   Quantity:  35.44 g   Refills:  3       Lidocaine HCl Monohydrate Powd        Refills:  0       MAGNESIUM CITRATE PO   Indication:  up to 3 times daily        Dose:  400 mg   Take 400 mg by mouth daily   Refills:  0       methocarbamol 750 MG tablet   Commonly known as:  ROBAXIN   Used for:  Myofascial pain        Dose:  750 mg   Take 1 tablet (750 mg) by mouth 4 times daily as needed . Ok to take a 5th Robaxin on very severe days.   Quantity:  360 tablet   Refills:  1       mometasone 110 MCG/INH inhaler   Commonly known as:  ASMANEX 30 METERED DOSES   Used for:  Intermittent asthma, uncomplicated        Dose:  1 puff   Inhale 1 puff into the lungs daily   Quantity:  3 Inhaler   Refills:  3       montelukast 10 MG tablet   Commonly known as:  SINGULAIR   Used for:  Idiopathic mast cell activation syndrome (H)        TAKE TWO TABLETS BY MOUTH TWICE DAILY   Quantity:  120 tablet   Refills:  11       * morphine 15 MG 12 hr tablet   Commonly known as:  MS CONTIN        Dose:  30 mg   30 mg At Bedtime   Refills:  0       * morphine 30 MG 12 hr tablet   Commonly known as:  MS  CONTIN   Used for:  Chest wall pain        Dose:  30 mg   Take 1 tablet (30 mg) by mouth every 8 hours   Quantity:  90 tablet   Refills:  0       NASALCROM 5.2 MG/ACT Aers Inhaler   Used for:  Mast cell disease, systemic (H)   Generic drug:  cromolyn sodium        SPRAY ONE SPRAY( 1 ML) IN NOSTRIL DAILY   Quantity:  1 Bottle   Refills:  6       OMEGA 3 PO        Dose:  5 mL   Take 5 mLs by mouth   Refills:  0       ondansetron 4 MG ODT tab   Commonly known as:  ZOFRAN ODT   Used for:  Nausea with vomiting        Dose:  4-8 mg   Take 1-2 tablets (4-8 mg) by mouth every 8 hours as needed for nausea or vomiting   Quantity:  60 tablet   Refills:  6       oxyCODONE 10 MG IR tablet   Commonly known as:  ROXICODONE   Used for:  Neoplasm related pain        Dose:  15-20 mg   Take 1.5-2 tablets (15-20 mg) by mouth every 4 hours as needed   Quantity:  180 tablet   Refills:  0       polyethylene glycol powder   Commonly known as:  MIRALAX   Used for:  Acute constipation        Dose:  1 capful   Take 17 g by mouth daily   Quantity:  510 g   Refills:  1       potassium chloride SA 20 MEQ CR tablet   Commonly known as:  potassium chloride   Used for:  Hypokalemia        Dose:  20 mEq   Take 1 tablet (20 mEq) by mouth daily   Quantity:  90 tablet   Refills:  3       PROBIOTIC DAILY PO   Used for:  Breast cancer, unspecified laterality, Thyroid cancer (H), Chronic arthralgias of knees and hips, unspecified laterality        Dose:  1 capsule   Take 1 capsule by mouth daily Lacto acid bifidobacterium   Refills:  0       ranitidine 75 MG tablet   Commonly known as:  ZANTAC   Used for:  Mast cell disease, systemic (H)        Dose:  75 mg   Take 1 tablet (75 mg) by mouth 3 times daily   Quantity:  270 tablet   Refills:  3       SUMAtriptan 50 MG tablet   Commonly known as:  IMITREX   Used for:  Migraine without status migrainosus, not intractable, unspecified migraine type        Dose:  50 mg   Take 1 tablet (50 mg) by mouth at onset of  headache for migraine (may repeat in 2 hours as needed, max 2 tablets daily)   Quantity:  5 tablet   Refills:  3       tacrolimus 0.1 % ointment   Commonly known as:  PROTOPIC   Used for:  Rash, Intertrigo        Apply topically 2 times daily   Quantity:  60 g   Refills:  3       tamoxifen 20 MG tablet   Commonly known as:  NOLVADEX   Used for:  Malignant neoplasm of female breast, unspecified laterality, unspecified site of breast        Dose:  20 mg   Take 1 tablet (20 mg) by mouth daily   Quantity:  90 tablet   Refills:  3       triamcinolone 0.025 % ointment   Commonly known as:  KENALOG   Used for:  Intertrigo, Rash        Apply topically 2 times daily Mix with 1 lb jar of vaseline or aquaphor   Quantity:  80 g   Refills:  3       TUMS 500 MG chewable tablet   Generic drug:  calcium carbonate        Dose:  1.5 chew tab   Take 2 tablets (1,000 mg) by mouth nightly as needed for other (cramps)   Quantity:  180 chew tab   Refills:  3       * UNABLE TO FIND        Dose:  1 tablet   1 tablet 3 times daily MEDICATION NAME: calcium D-Glucarate   Refills:  0       * UNABLE TO FIND   Indication:  On hold for drug testing   Used for:  Breast cancer, unspecified laterality, Papillary carcinoma, follicular variant (H), Chronic musculoskeletal pain        Dose:  2 tablet   2 tablets 3 times daily MEDICATION NAME: Natural D-Hist   Refills:  0       * UNABLE TO FIND        MEDICATION NAME: Tumeric 3 capsules daily   Refills:  0       * UNABLE TO FIND   Used for:  Thyroid cancer (H), Postsurgical hypothyroidism, Postsurgical hypoparathyroidism (H)        Dose:  1 tablet   1 tablet 3 times daily MEDICATION NAME: Digestzymes   Refills:  0       * UNABLE TO FIND   Indication:  P5P50   Used for:  Thyroid cancer (H), Postsurgical hypothyroidism, Postsurgical hypoparathyroidism (H)        Dose:  1 tablet   1 tablet daily MEDICATION NAME: Pure Encapsulations   Refills:  0       VENTOLIN  (90 BASE) MCG/ACT Inhaler   Used for:   Mild intermittent asthma without complication   Generic drug:  albuterol        INHALE 2 PUFFS INTO THE LUNGS EVERY 4 HOURS AS NEEDED FOR SHORTNESS OF BREATH OR DIFFICULT BREATHING OR WHEEZING   Quantity:  18 g   Refills:  3       vitamin D3 2000 UNITS Caps   Used for:  Thyroid cancer (H), Postsurgical hypothyroidism, Papillary carcinoma, follicular variant (H), Postsurgical hypoparathyroidism (H)        Dose:  37661 Units   Take 10,000 Units by mouth daily   Quantity:  60 capsule   Refills:  4       ZYRTEC ALLERGY 10 MG tablet   Generic drug:  cetirizine        Dose:  10 mg   Take 1 tablet (10 mg) by mouth 3 times daily On hold for lab test.   Quantity:  90 tablet   Refills:  6       * Notice:  This list has 9 medication(s) that are the same as other medications prescribed for you. Read the directions carefully, and ask your doctor or other care provider to review them with you.      CONTINUE these medicines which have NOT CHANGED        Dose / Directions    * order for DME   Used for:  Venous stasis        Equipment being ordered: compression stockings. 20-30 mm or 30 - 40 mm as patient can tolerate. Physical therapy to determine if they should be above or below the knee.   Quantity:  2 Units   Refills:  4       * order for DME   Used for:  Malignant neoplasm of right female breast, unspecified site of breast        Equipment being ordered: compression bra   Quantity:  2 Device   Refills:  1       * Notice:  This list has 2 medication(s) that are the same as other medications prescribed for you. Read the directions carefully, and ask your doctor or other care provider to review them with you.             Protect others around you: Learn how to safely use, store and throw away your medicines at www.disposemymeds.org.         Follow-ups after your visit        Your next 10 appointments already scheduled     Oct 04, 2017  7:00 AM CDT   (Arrive by 6:45 AM)   Return Visit with Edu Benitez MD   [facility]  [facility] ([facility])    [address] [place] [address]  [address] [facility] [id number] [phone]            Nov 02, 2017  5:00 PM CDT   US HEAD NECK SOFT TISSUE with UCUS3   [facility] ([facility])    [address] [place] [id number] [phone]           Please bring a list of your medicines (including vitamins, minerals and over-the-counter drugs). Also, tell your doctor about any allergies you may have. Wear comfortable clothes and leave your valuables at home.  You do not need to do anything special to prepare for your exam.  Please call the Imaging Department at your exam site with any questions.            Nov 02, 2017  6:00 PM CDT   LAB with  LAB   [facility])    [address] [place] [address] [id number] [phone]           Patient must bring picture ID. Patient should be prepared to give a urine specimen  Please do not eat 10-12 hours before your appointment if you are coming in fasting for labs on lipids, cholesterol, or glucose (sugar). Pregnant women should follow their Care Team instructions. Water with medications is okay. Do not drink coffee or other fluids. If you have concerns about taking  your medications, please ask at office or if scheduling via [employer], send a message by clicking on Secure Messaging, Message Your Care Team.            Nov 13, 2017  4:30 PM CST   (Arrive by 4:15 PM)   RETURN ENDOCRINE with Avelina Castro MD   [facility] ([facility])    [address] [place] [address]  [address] [facility] [id number] [phone]            Nov 27, 2017  4:00 PM CST   (Arrive by 3:45 PM)   Return Visit with Shahla Crawley MD   [facility] ([facility])    [address] [place] [address] [id number] [phone]                Care Instructions        Further instructions from your care team       [facility]    Interventional Radiology  Patient Instructions Following Biopsy    AFTER YOU GO HOME  ? If you were given sedation DO NOT drive or operate machinery at home or at work for at least 24 hours  ? DO relax and take it easy for 48 hours, no strenuous activity for 24 hours  ? DO drink plenty of fluids  ? DO resume your regular diet, unless otherwise instructed by your Primary Physician  ? Keep the dressing dry and in place for 24 hours.  ? DO NOT SMOKE FOR AT LEAST 24 HOURS, if you have been given any medications that were to help you relax or sedate you during your procedure  ? DO NOT drink alcoholic beverages the day of your procedure  ? DO NOT do any strenuous exercise or lifting (> 10 lbs) for at least 3 days following your procedure  ? DO NOT take a bath or shower for at least 12 hours following your procedure  ? Remove dressing after shower the next day. Replace with Band aid for 2 days.  Never leave a wet dressing in place.  ? DO NOT make any important or legal decisions for 24 hours following your procedure  ? There should be minimum drainage from the biopsy site    CALL THE PHYSICIAN IF:  ? You start bleeding from the procedure site.  If you do start to bleed from that site, lie down flat and hold pressure on the site for a minimum of 10 minutes.  Your physician will tell you if you need to return to the hospital  ? You develop nausea or vomiting  ? You have excessive swelling, redness, or tenderness at the site  ? You have drainage that looks like it is infected.  ? You experience severe pain  ? You develop hives or a rash or unexplained itching  ? You develop shortness of breath  ? You develop a temperature of 101 degrees F or greater    ADDITIONAL INSTRUCTIONS  If you are taking Coumadin, restart tonight.  Follow up with your Coumadin Clinic or Primary Care MD to have your INR rechecked.    [facility]  "INTERVENTIONAL RADIOLOGY DEPARTMENT  Procedure Physician: Dr Blevins                                       Date of procedure: September 15, 2017  Telephone Numbers: [phone] Monday-Friday 8:00 am to 4:30 pm  [phone] After 4:30 pm Monday-Friday, Weekends & Holidays. Ask for the Interventional Radiologist on call.  Someone is on call 24 hrs/day  [facility] toll free number: [phone] Monday-Friday 8:00 am to 4:30 pm  [facility] Emergency Dept: [phone]           Additional Information About Your Visit        MyChart Information     [employer] gives you secure access to your electronic health record. If you see a primary care provider, you can also send messages to your care team and make appointments. If you have questions, please call your primary care clinic.  If you do not have a primary care provider, please call [phone] and they will assist you.        Care EveryWhere ID     This is your Care EveryWhere ID. This could be used by other organizations to access your [facility] medical records  [id number]        Your Vitals Were     Blood Pressure Temperature Respirations Height Weight Pulse Oximetry    108/66 (BP Location: Left arm) 98.6  F (37  C) (Oral) 16 1.651 m (5' 5\") 87.9 kg (193 lb 12.6 oz) 96%    BMI (Body Mass Index)                   32.25 kg/m2            Primary Care Provider Office Phone # Fax #    Shahla Crawley [phone]      Equal Access to Services     [facility]: Hadii sacha ku hadasho Soomaali, waaxda luqadaha, qaybta kaalmada sowmyaegtiffani, waxay sulma yoder . So [facility] [phone].    ATENCIÓN: Si habla español, tiene a stiles disposición servicios gratuitos de asistencia lingüística. Llame al [phone].    We comply with applicable federal civil rights laws and [place] laws. We do not discriminate on the basis of race, color, national origin, age, disability sex, sexual orientation or gender identity.            Thank you!     Thank you for " choosing [facility] for your care. Our goal is always to provide you with excellent care. Hearing back from our patients is one way we can continue to improve our services. Please take a few minutes to complete the written survey that you may receive in the mail after you visit with us. Thank you!             Medication List: This is a list of all your medications and when to take them. Check marks below indicate your daily home schedule. Keep this list as a reference.      Medications           Morning Afternoon Evening Bedtime As Needed    ACAI PO   Take 1,000 mg by mouth 2 times daily                                aluminum chloride 20 % external solution   Commonly known as:  DRYSOL   Apply topically At Bedtime                                AZMACORT IN   Inhale 2 puffs into the lungs as needed                                BENADRYL PO   Take 50 mg by mouth as needed                                BETAINE HCL PO   Take 2 tablets by mouth as needed Betaine HCL and Pepsin: Take 1-7 tabs by mouth daily, if burning take one tablet less once daily. Betaine HCL 1.04 g Pepsin 40 mg                                calcitRIOL 0.25 MCG capsule   Commonly known as:  ROCALTROL   2 tabs in am and 1 tab qhs                                CALCIUM CITRATE +D 315-250 MG-UNIT Tabs per tablet   Take 2 tablets by mouth 3 times daily   Generic drug:  calcium citrate-vitamin D                                celecoxib 200 MG capsule   Commonly known as:  celeBREX   Take 1 capsule (200 mg) by mouth 2 times daily                                chlorhexidine 0.12 % solution   Commonly known as:  PERIDEX                                * Colchicine 0.6 MG Caps   Take 0.6 mg by mouth 3 times daily for costochondritis (chest) discomfort. Scale back use to prn with loose stools or bloating.                                * COLCRYS 0.6 MG tablet   Take 1.6 mg by mouth 3 times daily   Generic drug:  colchicine                                 COMPOUNDED NON-CONTROLLED SUBSTANCE - PHARMACY TO MIX COMPOUNDED MEDICATION   Commonly known as:  CMPD RX   Apply small amount to affected area two times daily.Ketamine 6% + ketoprofen 10% + lidocaine 10% in Lipo.                                cromolyn 4 % ophthalmic solution   Commonly known as:  OPTICROM   Place 1 drop into both eyes 4 times daily                                cyclobenzaprine 10 MG tablet   Commonly known as:  FLEXERIL   Take 10 mg by mouth 3 times daily as needed On hold Dr Monsalve                                dexamethasone 4 MG tablet   Commonly known as:  DECADRON   Take 1 tablet (4 mg) by mouth 2 times daily (with meals)                                diazepam 5 MG tablet   Commonly known as:  VALIUM   Take 1 tablet (5 mg) by mouth 3 times daily as needed For muscle spasms                                diclofenac 1 % Gel topical gel   Commonly known as:  VOLTAREN   Apply affected area two times daily PRN using enclosed dosing card.                                docusate sodium 100 MG tablet   Commonly known as:  COLACE   Take 100 mg by mouth daily                                EPINEPHrine 0.3 MG/0.3ML injection 2-pack   Commonly known as:  EPIPEN 2-CARIDAD   Inject 0.3 mLs (0.3 mg) into the muscle once as needed for anaphylaxis                                hydrochlorothiazide 12.5 MG capsule   Commonly known as:  MICROZIDE   Take 1 capsule (12.5 mg) by mouth daily                                HYDROcodone-acetaminophen 5-325 MG per tablet   Commonly known as:  NORCO   TK 1 T PO Q 4 TO 6 H PRN P                                levothyroxine 112 MCG tablet   Commonly known as:  SYNTHROID/LEVOTHROID   Mon-Sat: (2 tabs daily) Sunday: 3 tabs                                lidocaine 5 % ointment   Commonly known as:  XYLOCAINE   SANJANA TOPICALLY QID PRN                                Lidocaine HCl Monohydrate Powd                                MAGNESIUM CITRATE PO   Take 400 mg by mouth daily                                 methocarbamol 750 MG tablet   Commonly known as:  ROBAXIN   Take 1 tablet (750 mg) by mouth 4 times daily as needed . Ok to take a 5th Robaxin on very severe days.                                mometasone 110 MCG/INH inhaler   Commonly known as:  ASMANEX 30 METERED DOSES   Inhale 1 puff into the lungs daily                                montelukast 10 MG tablet   Commonly known as:  SINGULAIR   TAKE TWO TABLETS BY MOUTH TWICE DAILY                                * morphine 15 MG 12 hr tablet   Commonly known as:  MS CONTIN   30 mg At Bedtime                                * morphine 30 MG 12 hr tablet   Commonly known as:  MS CONTIN   Take 1 tablet (30 mg) by mouth every 8 hours                                NASALCROM 5.2 MG/ACT Aers Inhaler   SPRAY ONE SPRAY( 1 ML) IN NOSTRIL DAILY   Generic drug:  cromolyn sodium                                OMEGA 3 PO   Take 5 mLs by mouth                                ondansetron 4 MG ODT tab   Commonly known as:  ZOFRAN ODT   Take 1-2 tablets (4-8 mg) by mouth every 8 hours as needed for nausea or vomiting                                * order for DME   Equipment being ordered: compression stockings. 20-30 mm or 30 - 40 mm as patient can tolerate. Physical therapy to determine if they should be above or below the knee.                                * order for DME   Equipment being ordered: compression bra                                oxyCODONE 10 MG IR tablet   Commonly known as:  ROXICODONE   Take 1.5-2 tablets (15-20 mg) by mouth every 4 hours as needed                                polyethylene glycol powder   Commonly known as:  MIRALAX   Take 17 g by mouth daily                                potassium chloride SA 20 MEQ CR tablet   Commonly known as:  potassium chloride   Take 1 tablet (20 mEq) by mouth daily                                PROBIOTIC DAILY PO   Take 1 capsule by mouth daily Lacto acid bifidobacterium                                 ranitidine 75 MG tablet   Commonly known as:  ZANTAC   Take 1 tablet (75 mg) by mouth 3 times daily                                SUMAtriptan 50 MG tablet   Commonly known as:  IMITREX   Take 1 tablet (50 mg) by mouth at onset of headache for migraine (may repeat in 2 hours as needed, max 2 tablets daily)                                tacrolimus 0.1 % ointment   Commonly known as:  PROTOPIC   Apply topically 2 times daily                                tamoxifen 20 MG tablet   Commonly known as:  NOLVADEX   Take 1 tablet (20 mg) by mouth daily                                triamcinolone 0.025 % ointment   Commonly known as:  KENALOG   Apply topically 2 times daily Mix with 1 lb jar of vaseline or aquaphor                                TUMS 500 MG chewable tablet   Take 2 tablets (1,000 mg) by mouth nightly as needed for other (cramps)   Generic drug:  calcium carbonate                                * UNABLE TO FIND   1 tablet 3 times daily MEDICATION NAME: calcium D-Glucarate                                * UNABLE TO FIND   2 tablets 3 times daily MEDICATION NAME: Natural D-Hist                                * UNABLE TO FIND   MEDICATION NAME: Tumeric 3 capsules daily                                * UNABLE TO FIND   1 tablet 3 times daily MEDICATION NAME: Digestzymes                                * UNABLE TO FIND   1 tablet daily MEDICATION NAME: Pure Encapsulations                                VENTOLIN  (90 BASE) MCG/ACT Inhaler   INHALE 2 PUFFS INTO THE LUNGS EVERY 4 HOURS AS NEEDED FOR SHORTNESS OF BREATH OR DIFFICULT BREATHING OR WHEEZING   Generic drug:  albuterol                                vitamin D3 2000 UNITS Caps   Take 10,000 Units by mouth daily                                ZYRTEC ALLERGY 10 MG tablet   Take 1 tablet (10 mg) by mouth 3 times daily On hold for lab test.   Generic drug:  cetirizine                                * Notice:  This list has 11 medication(s)  that are the same as other medications prescribed for you. Read the directions carefully, and ask your doctor or other care provider to review them with you.

## 2017-09-15 NOTE — PROGRESS NOTES
Interventional Radiology Intra-procedural Nursing Note    Patient Name: Tisha Arias  Medical Record Number: [id number]  Today's Date: 09/15/17    Start Time: 1020  End of procedure time: 1040  Procedure: CT bone biopsy  Report given to: 2A RN  Time pt departs:  1050    Patient arrived in CT 2 from 2A.  Positioned prone and draped.  Biopsies obtained by Dr. Blevins.  Patient tolerated procedure well.  VSS.    Fentanyl given: 200 mcg  Versed Given 4 mg  Sedation time: 20min    Rob Rea RN

## 2017-09-15 NOTE — IP AVS SNAPSHOT
[address] [address]                                       After Visit Summary   9/15/2017    Tisha Arias    MRN: [id number]           After Visit Summary Signature Page     I have received my discharge instructions, and my questions have been answered. I have discussed any challenges I see with this plan with the nurse or doctor.    ..........................................................................................................................................  Patient/Patient Representative Signature      ..........................................................................................................................................  Patient Representative Print Name and Relationship to Patient    ..................................................               ................................................  Date                                            Time    ..........................................................................................................................................  Reviewed by Signature/Title    ...................................................              ..............................................  Date                                                            Time

## 2017-09-15 NOTE — PROGRESS NOTES
Patient tolerated recovery stage well. VSS, mid back site clean/dry/intact, no hematoma.  Has chronic sharp pain (states from tumors) under sternum frontal area, soreness at puncture site mid back.   Patient tolerated PO food and fluids. Teaching was done and discharge instructions were given. Patient ambulated, voided, and PIV was removed. Patient discharged from the hospital via ambulatory accompanied by nursing personnel to home with her family.

## 2017-09-15 NOTE — LETTER
9/15/2017       RE: Tisha Arias  [address]  [place] [address]     To whom it may concern    Mr. Arias has complex chronic pain related to recurrent breast cancer of the chest. She uses a compounded gel which include 6% ketamine, 10% lidocaine, 10% ketoprofen, in PLO. This gel is necessary for her to receive adequate pain control so she can remain active and unemployed. Please provide insurance coverage of this necessary medicine.    Sincerely,        Edu Benitez MD

## 2017-09-15 NOTE — PROGRESS NOTES
Interventional Radiology Pre-Procedure Sedation Assessment   Time of Assessment: 9:32 AM    Expected Level: Moderate Sedation    Indication: Sedation is required for the following type of Procedure: Biopsy    Sedation and procedural consent: Risks, benefits and alternatives were discussed with Patient    PO Intake: Appropriately NPO for procedure    ASA Class: Class 2 - MILD SYSTEMIC DISEASE, NO ACUTE PROBLEMS, NO FUNCTIONAL LIMITATIONS.    Mallampati: Grade 2:  Soft palate, base of uvula, tonsillar pillars, and portion of posterior pharyngeal wall visible    Lungs: Lungs Clear with good breath sounds bilaterally    Heart: Normal heart sounds and rate    History and physical reviewed and no updates needed. I have reviewed the lab findings, diagnostic data, medications, and the plan for sedation. I have determined this patient to be an appropriate candidate for the planned sedation and procedure and have reassessed the patient IMMEDIATELY PRIOR to sedation and procedure.    Marcelo Carmona MD

## 2017-09-18 NOTE — TELEPHONE ENCOUNTER
Medication Appeal Initiation    We have initiated an appeal for the requested medication:  Medication: FV-Ketamine 6%, Lidocaine 10% ketoprofen 10 % in PLO- Denied; Appeal pending  Appeal Start Date:  9/18/2017  Insurance Company: [employer] - Phone [phone] Fax [phone]  Comments:  Manually faxed urgent appeal with letter of medical necessity to [employer]: [phone]

## 2017-09-19 ENCOUNTER — TELEPHONE (OUTPATIENT)
Dept: GASTROENTEROLOGY | Facility: CLINIC | Age: 57
End: 2017-09-19

## 2017-09-19 NOTE — TELEPHONE ENCOUNTER
"Final biopsy results from recent EUS with core biopsy of level 7 node returned and we not definitive. Specimen read a \"adequate\" but unable to definitive determine presence of malignancy or primary site due to marked necrosis. Numerous passes were made into multiple portions of the lesion and I do not believe that repeat EUS would be of high yield.    Unable to reach pt at listed number. VM left for her to call and letter sent.    MARQUISE Robles MD  Associate Professor of Medicine  Division of Gastroenterology, Hepatology and Nutrition  [facility]    "

## 2017-09-20 ENCOUNTER — TELEPHONE (OUTPATIENT)
Dept: GASTROENTEROLOGY | Facility: CLINIC | Age: 57
End: 2017-09-20

## 2017-09-20 ENCOUNTER — CARE COORDINATION (OUTPATIENT)
Dept: ONCOLOGY | Facility: CLINIC | Age: 57
End: 2017-09-20

## 2017-09-20 ENCOUNTER — TELEPHONE (OUTPATIENT)
Dept: PALLIATIVE CARE | Facility: CLINIC | Age: 57
End: 2017-09-20

## 2017-09-20 DIAGNOSIS — G89.3 NEOPLASM RELATED PAIN: Primary | ICD-10-CM

## 2017-09-20 RX ORDER — MORPHINE SULFATE 15 MG/1
15 TABLET, FILM COATED, EXTENDED RELEASE ORAL AT BEDTIME
Qty: 30 TABLET | Refills: 0 | Status: SHIPPED | OUTPATIENT
Start: 2017-09-20 | End: 2017-09-27

## 2017-09-20 NOTE — PROGRESS NOTES
Called patient's work number and left a voice mail to return call with Pathology not completed at this time and will be contacted when results of biopsy finalized per Evelia Johns. Dr Crawley offered an appointment on 9/24/17 at 4PM. Instructed patient to return call to discuss above information. Seema Chadwick RN, BSN

## 2017-09-20 NOTE — TELEPHONE ENCOUNTER
Sternal pain severe  Hard to take breath in  Not clearly worse than 2 weeks ago; not clearly better    Oxycodone: take one, then a couple hours later another    Not sleeping due to pain    Working well, denies cognitive side effects or sedation at all    We will try 45mg MS Contin at night instead of 30mg

## 2017-09-20 NOTE — TELEPHONE ENCOUNTER
Pt returned my call and I called her back.    Discussed EUS-guided biopsy results. Non-diagnostic due to necrosis. I do not recommend repeat needle biopsy. Recommended awaiting biopsy results from bone lesion and regrouping with Dr. Crawley.    MARQUISE Robles MD  Associate Professor of Medicine  Division of Gastroenterology, Hepatology and Nutrition  [facility]

## 2017-09-21 ENCOUNTER — TELEPHONE (OUTPATIENT)
Dept: ONCOLOGY | Facility: CLINIC | Age: 57
End: 2017-09-21

## 2017-09-21 LAB — COPATH REPORT: NORMAL

## 2017-09-21 NOTE — TELEPHONE ENCOUNTER
September 21, 2017    Spoke with Elvin over the phone to discuss biopsy results.    We discussed that the CT-guided biopsy of the soft tissue mass is showing a B-cell lymphoma, and that we are awaiting final repots to determine what type of Lymphoma. We reviewed that there are many types of lymphoma and the treatment course is dictated by the type of lymphoma, so we will need to await that result to know what the next steps will be.    We also reviewed that the EUS lymph node biopsy was inconclusive, but suspicious for malignancy. After speaking with the patient, I received an update that Dr. Crawley has requested that the biopsy be routed to heme path now that we know the mass is lymphoma.    I told Elvin I would call her today or tomorrow with any updates. We will have her see Dr. Sauceda next Wednesday.    Evelia Johns PA-C

## 2017-09-21 NOTE — TELEPHONE ENCOUNTER
Called [employer] and was informed to call [phone] for this appeal and was informed to send it to fax [phone]. Faxed appeal with letter of medical necessity to that number given.

## 2017-09-22 ENCOUNTER — TELEPHONE (OUTPATIENT)
Dept: ONCOLOGY | Facility: CLINIC | Age: 57
End: 2017-09-22

## 2017-09-22 DIAGNOSIS — C79.51 SPINE METASTASIS: ICD-10-CM

## 2017-09-22 DIAGNOSIS — R59.0 HILAR ADENOPATHY: ICD-10-CM

## 2017-09-22 RX ORDER — DEXAMETHASONE 4 MG/1
4 TABLET ORAL 2 TIMES DAILY WITH MEALS
Qty: 10 TABLET | Refills: 0 | Status: SHIPPED | OUTPATIENT
Start: 2017-09-22 | End: 2017-09-28

## 2017-09-22 NOTE — TELEPHONE ENCOUNTER
"September 22, 2017    Spoke to Elvin on the phone regarding the final pathology report reading \"high grade lymphoma\".    We spent some time reviewing how we classify lymphomas, and that \"high grade\" tends to mean rapidly growing lymphomas. We discussed that quite often, patients with high grade lymphoma present with symptoms related to the location of the mass. We reviewed that there are cytogenetic tests being performed on the tissue which can further classify the type of high-grade B-cell lymphoma that she has, and that the results of this testing may help guide treatment. She asked about potential treatment. We discussed that although Dr. Sauceda will review this in detail with her, I would anticipate systemic chemotherapy in the form of R-CHOP or R-EPOCH, but that Dr. Sauceda will be making that decision once she has been fully staged.    I am going to refill the dexamethasone that was prescribed for the swelling in the back. She has been out of this medication and has found that the swelling worsens off of it.    Evelia Johns PA-C      "

## 2017-09-25 ENCOUNTER — ONCOLOGY VISIT (OUTPATIENT)
Dept: ONCOLOGY | Facility: CLINIC | Age: 57
End: 2017-09-25
Attending: INTERNAL MEDICINE
Payer: COMMERCIAL

## 2017-09-25 VITALS
RESPIRATION RATE: 16 BRPM | TEMPERATURE: 99.2 F | DIASTOLIC BLOOD PRESSURE: 90 MMHG | WEIGHT: 193.2 LBS | HEART RATE: 80 BPM | SYSTOLIC BLOOD PRESSURE: 132 MMHG | HEIGHT: 65 IN | OXYGEN SATURATION: 96 % | BODY MASS INDEX: 32.19 KG/M2

## 2017-09-25 DIAGNOSIS — C85.16 B-CELL LYMPHOMA OF INTRAPELVIC LYMPH NODES, UNSPECIFIED B-CELL LYMPHOMA TYPE (H): Primary | ICD-10-CM

## 2017-09-25 PROCEDURE — 99212 OFFICE O/P EST SF 10 MIN: CPT | Mod: ZF

## 2017-09-25 PROCEDURE — 99215 OFFICE O/P EST HI 40 MIN: CPT | Mod: ZP | Performed by: INTERNAL MEDICINE

## 2017-09-25 ASSESSMENT — PAIN SCALES - GENERAL: PAINLEVEL: WORST PAIN (10)

## 2017-09-25 NOTE — TELEPHONE ENCOUNTER
Called [employer] Appeal dept at [phone] and confirmed they have received the appeal and are currently working on this case.

## 2017-09-25 NOTE — PROGRESS NOTES
September 25, 2017    REASON FOR VISIT:  F/u breast cancer and thyroid cancer     HISTORY OF PRESENT ILLNESS:  Tisha Arias is a 57-year-old woman, postmenopausal, with a history of T1c N0 M0, infiltrating ductal carcinoma of the right breast with a strong family history of breast cancer. BRCA1 and 2 gene testing negative who is here for follow up.    Cancer Treatment History for her breast cancer:  -She was diagnosed with breast cancer in 12/2011.    -She underwent bilateral mastectomy with immediate reconstruction on 01/30/2012 by Dr. Daugherty.  Her final pathology revealed 1.6-cm, infiltrating ductal carcinoma, grade 1/3, no angiolymphatic space invasion, no nipple or skin invasion.  There was associated DCIS, and the margins were free of tumor superiorly, anterior margin by 0.1 cm, and widely free at remaining margins.  This was ER/VA-positive, HER2-negative in the vast majority of cells and non-amplified with a ratio of 1.1.  She had an Oncotype test performed, which revealed a low-risk score of 11.  That put her overall risk of recurrence at about 7% after 5 years of tamoxifen.    - in 02/2012, she was started on Arimidex.    - 12/2012, she had prophylactic hysterectomy and oophorectomy, the pathology of which was benign.    - Her course has been complicated by extreme chest wall pain, myofascial pain, and neuropathic pain.  She has gone through several different clinics and was put on several different medications, which were not doing her any good.  Eventually she was weaned off all the medications and is currently doing only PT with myofascial pain maneuvers with symptomatic improvement.   - 2/2014 switched from arimidex to tamoxifen  - presented to the ER on 9/1/17 for chest pain. CT-PA was negative for PE but showed right 3cm paraspinal mass and subcarinal adenopathy. PET on 9/6/17 showed the paraspinal mass to be hypermetabolic along with hilar and mediastinal nodes.   - biopsy of T10 vertebral  "body on 9/15/17 showed high-grade B cell lymphoma, with cytogenetics pending to evaluate for double/triple hit    - [facility] EUS of subcarinal LN on 9/14 showed mostly necrotic tissue with suspicion for malignancy (pathology has been requested to see if this may be high grade B cell lymphoma; this is pending)    Thyroid cancer history: Incidentally, given that she was having so many symptoms, she underwent a PET scan in 4/2013, which revealed a thyroid nodule which was biopsied and was consistent with papillary carcinoma.  She has undergone resection as well as radioiodine treatment since and has done relatively well from the surgery.  She follows up with Dr. Castro for the same.  She unfortunately required etoh ablation therapy over the thyroid lymph nodes.   She remains under the care of Dr Avelina Castro.       INTERVAL HISTORY: Elvin is here to f/u on biopsy results, after being found to have a paravertebral mass on CT-PA earlier this month.     She reports severe pain.  10/10.  She believes her pain is worse if off of steroids.  She remains on decadron 4 mg bid.  She is taking oxycodone up to 20 mg q4 hours and MS contin 45 mg at bedtime and 30 mg in am and afternoon.  Pain is managed under the care of Dr Edu Benitez.    She is anxious about her diagnosis.  She is angry and frustrated that this was not picked up sooner.  She is very concerned that her pain is not being managed well.    She also reports mild nause, and intermittent emesis.       Her 10-point review of systems is otherwise negative.        PAST MEDICAL HISTORY: unchanged other than what is written in the HPI.    MEDICATIONS:  reviewed     PHYSICAL EXAMINATION:    VITAL SIGNS:/90  Pulse 80  Temp 99.2  F (37.3  C) (Oral)  Resp 16  Ht 1.651 m (5' 5\")  Wt 87.6 kg (193 lb 3.2 oz)  SpO2 96%  BMI 32.15 kg/m2  Vitals in chart and reviewed  GENERAL:  She is well-appearing, in no apparent distress.    HEENT:  Exam reveals no icterus or " pallor.  Oropharynx is clear with no ulcers or lesions.    NECK:  Supple.  No supraclavicular or cervical lymphadenopathy.  She has a scar from thyroid surgery.     HEART:  Regular rate and rhythm.  S1, S2 clear.  No murmur, gallop or rub.    LUNGS:  Clear to auscultation bilaterally.    ABDOMEN:  Soft, nontender, nondistended, no organomegaly.    BREAST EXAM:  Not performed today.  NO supraclavicular or axillary adenopathy appreciated.  MUSC: tenderness in midspine with associated paraspinal tenderness, no other reproducible tenderness  EXTREMITIES:  She has no lower extremity edema. , ambulating without difficulty, normal 5/5 strength distant LE.  1+ DTRs in knees bilaterally   SKIN: no rash.     LABORATORY:    Lab Results   Component Value Date    WBC 7.1 09/14/2017     Lab Results   Component Value Date    RBC 4.44 09/14/2017     Lab Results   Component Value Date    HGB 11.0 09/14/2017     Lab Results   Component Value Date    HCT 36.2 09/14/2017     No components found for: MCT  Lab Results   Component Value Date    MCV 82 09/14/2017     Lab Results   Component Value Date    MCH 24.8 09/14/2017     Lab Results   Component Value Date    MCHC 30.4 09/14/2017     Lab Results   Component Value Date    RDW 15.1 09/14/2017     Lab Results   Component Value Date     09/14/2017       Recent Labs   Lab Test  09/14/17   1145  09/07/17   1021  09/01/17   1623   NA   --   139  143   POTASSIUM   --   3.9  3.6   CHLORIDE   --   104  108   CO2   --   28  29   ANIONGAP   --   7  7   GLC   --   111*  117*   BUN  15  13  12   CR   --   0.85  0.82   ELMO   --   8.6  8.6     Liver Function Studies -   Recent Labs   Lab Test  09/07/17   1021   PROTTOTAL  7.3   ALBUMIN  3.3*   BILITOTAL  0.5   ALKPHOS  70   AST  18   ALT  22          ASSESSMENT AND PLAN:      Newly diagnosed high-grade B cell lymphoma: She has a right paraspinal mass invading the T10 vertebral body, necrotic subcarinal nodes, and a hypermetabolic left infra  hilar node on PET scan. This was initially concerning for recurrent breast cancer but biopsy of T10 shows lymphoma.     She needs to complete staging with bone marrow biopsy and likely an LP given the paraspinal location of the primary mass. TTE as anthracyclines will be part of her therapy. Additional labs needed are LDH, uric acid, SPEP, Hep B/C, and HIV.     Cytogenetics are pending to determine if this is double or triple hit, which would change the treatment.    I have discussed with her that I would like her to see my partner Dr Sauceda, a lymphoma expert.  We will arrange for staging evaluation, and to have her see Sohail; we discussed briefly that pt will need echo, port, and bone marrow.  I discussed that she will need systemic chemotherapy; the exact type will depend on the cytogenetics and Dr Sauceda's decision.      Stage I, ER/MS-positive, HER2-negative breast cancer: dx in 2011. Oncotype recurrence score of 11 (7% recurrence risk after 5 years of tamoxifen). S/p bilateral mastectomies and BENJIE/BSO. For endocrine therapy she was on arimidex from 9038-9118, then changed to tamoxifen b/c of arthralgias on Arimidex.  I discussed that as she starts systemic chemotherapy; she should hold her tamoxifen.     Papillary thyroid cancer, it is being followed by Dr. Avelina Castro.  She underwent etoh ablation x3 so far.        Synchronous thyroid cancer and breast cancer.  She is brca 1 and 2 negative.  Additional testing is negative.       Pain - likely exacerbated by compression of T10 nerve root compression.  She is using oxycodone up to 20 mg q 4 hours prn in addition to her usual regimen with ms contin and decadron 4 mg bid.  She is scheduled to see our palliative specialists tomorrow.  She has enough oxycodone to get to this appt.    I did discuss that she could be admitted for pain control and worked up as an inpatient.  She declined admission.    Iron deficiency anemia: she's had a gastric bypass  and has a G-J anastomosis so this may be malabsorption. EGD negative for ulcers;She has had a colonoscopy recently.  We discussed that she may benefit from IV iron.      Discussed emotional support.  Her daughter and a friend were with her today.  She is the primary breadwinner and insurance owner, and likely will need to talk to  at some point.      Shahla Crawley MD

## 2017-09-25 NOTE — NURSING NOTE
Patient left before receiving flu shot, order cancelled.  Celena Xavier CMA ([facility])

## 2017-09-25 NOTE — NURSING NOTE
"Oncology Rooming Note    September 25, 2017 4:02 PM   Tisha Arias is a 57 year old female who presents for:    Chief Complaint   Patient presents with     Oncology Clinic Visit     Breast Ca- F/U     Initial Vitals: Pulse 80  Temp 99.2  F (37.3  C) (Oral)  Resp 16  Ht 1.651 m (5' 5\")  Wt 87.6 kg (193 lb 3.2 oz)  SpO2 96%  BMI 32.15 kg/m2 Estimated body mass index is 32.15 kg/(m^2) as calculated from the following:    Height as of this encounter: 1.651 m (5' 5\").    Weight as of this encounter: 87.6 kg (193 lb 3.2 oz). Body surface area is 2 meters squared.  Worst Pain (10) Comment: back and chest   No LMP recorded. Patient has had a hysterectomy.  Allergies reviewed: Yes  Medications reviewed: Yes    Medications: Medication refills not needed today.  Pharmacy name entered into [employer]: [id number] - [place], [place] - [address] [facility]    Clinical concerns: no clinical concerns  provider was notified.    7 minutes for nursing intake (face to face time)     Carlyn Smiley CMA              "

## 2017-09-25 NOTE — LETTER
9/25/2017       RE: Tisha Arias  [address] [place] [address]     Dear Colleague,    Thank you for referring your patient, Tisha Arias, to the [facility]. Please see a copy of my visit note below.      September 25, 2017    REASON FOR VISIT:  F/u breast cancer and thyroid cancer     HISTORY OF PRESENT ILLNESS:  Tisha Arias is a 57-year-old woman, postmenopausal, with a history of T1c N0 M0, infiltrating ductal carcinoma of the right breast with a strong family history of breast cancer. BRCA1 and 2 gene testing negative who is here for follow up.    Cancer Treatment History for her breast cancer:  -She was diagnosed with breast cancer in 12/2011.    -She underwent bilateral mastectomy with immediate reconstruction on 01/30/2012 by Dr. Daugherty.  Her final pathology revealed 1.6-cm, infiltrating ductal carcinoma, grade 1/3, no angiolymphatic space invasion, no nipple or skin invasion.  There was associated DCIS, and the margins were free of tumor superiorly, anterior margin by 0.1 cm, and widely free at remaining margins.  This was ER/CT-positive, HER2-negative in the vast majority of cells and non-amplified with a ratio of 1.1.  She had an Oncotype test performed, which revealed a low-risk score of 11.  That put her overall risk of recurrence at about 7% after 5 years of tamoxifen.    - in 02/2012, she was started on Arimidex.    - 12/2012, she had prophylactic hysterectomy and oophorectomy, the pathology of which was benign.    - Her course has been complicated by extreme chest wall pain, myofascial pain, and neuropathic pain.  She has gone through several different clinics and was put on several different medications, which were not doing her any good.  Eventually she was weaned off all the medications and is currently doing only PT with myofascial pain maneuvers with symptomatic improvement.   - 2/2014 switched from arimidex to tamoxifen  - presented to the  ER on 9/1/17 for chest pain. CT-PA was negative for PE but showed right 3cm paraspinal mass and subcarinal adenopathy. PET on 9/6/17 showed the paraspinal mass to be hypermetabolic along with hilar and mediastinal nodes.   - biopsy of T10 vertebral body on 9/15/17 showed high-grade B cell lymphoma, with cytogenetics pending to evaluate for double/triple hit    - FNA EUS of subcarinal LN on 9/14 showed mostly necrotic tissue with suspicion for malignancy (pathology has been requested to see if this may be high grade B cell lymphoma; this is pending)    Thyroid cancer history: Incidentally, given that she was having so many symptoms, she underwent a PET scan in 4/2013, which revealed a thyroid nodule which was biopsied and was consistent with papillary carcinoma.  She has undergone resection as well as radioiodine treatment since and has done relatively well from the surgery.  She follows up with Dr. Castro for the same.  She unfortunately required etoh ablation therapy over the thyroid lymph nodes.   She remains under the care of Dr Avelina Castro.       INTERVAL HISTORY: Elvin is here to f/u on biopsy results, after being found to have a paravertebral mass on CT-PA earlier this month.     She reports severe pain.  10/10.  She believes her pain is worse if off of steroids.  She remains on decadron 4 mg bid.  She is taking oxycodone up to 20 mg q4 hours and MS contin 45 mg at bedtime and 30 mg in am and afternoon.  Pain is managed under the care of Dr Edu Benitez.    She is anxious about her diagnosis.  She is angry and frustrated that this was not picked up sooner.  She is very concerned that her pain is not being managed well.    She also reports mild nause, and intermittent emesis.       Her 10-point review of systems is otherwise negative.        PAST MEDICAL HISTORY: unchanged other than what is written in the HPI.    MEDICATIONS:  reviewed     PHYSICAL EXAMINATION:    VITAL SIGNS:/90  Pulse 80   "Temp 99.2  F (37.3  C) (Oral)  Resp 16  Ht 1.651 m (5' 5\")  Wt 87.6 kg (193 lb 3.2 oz)  SpO2 96%  BMI 32.15 kg/m2  Vitals in chart and reviewed  GENERAL:  She is well-appearing, in no apparent distress.    HEENT:  Exam reveals no icterus or pallor.  Oropharynx is clear with no ulcers or lesions.    NECK:  Supple.  No supraclavicular or cervical lymphadenopathy.  She has a scar from thyroid surgery.     HEART:  Regular rate and rhythm.  S1, S2 clear.  No murmur, gallop or rub.    LUNGS:  Clear to auscultation bilaterally.    ABDOMEN:  Soft, nontender, nondistended, no organomegaly.    BREAST EXAM:  Not performed today.  NO supraclavicular or axillary adenopathy appreciated.  MUSC: tenderness in midspine with associated paraspinal tenderness, no other reproducible tenderness  EXTREMITIES:  She has no lower extremity edema. , ambulating without difficulty, normal 5/5 strength distant LE.  1+ DTRs in knees bilaterally   SKIN: no rash.     LABORATORY:    Lab Results   Component Value Date    WBC 7.1 09/14/2017     Lab Results   Component Value Date    RBC 4.44 09/14/2017     Lab Results   Component Value Date    HGB 11.0 09/14/2017     Lab Results   Component Value Date    HCT 36.2 09/14/2017     No components found for: MCT  Lab Results   Component Value Date    MCV 82 09/14/2017     Lab Results   Component Value Date    MCH 24.8 09/14/2017     Lab Results   Component Value Date    MCHC 30.4 09/14/2017     Lab Results   Component Value Date    RDW 15.1 09/14/2017     Lab Results   Component Value Date     09/14/2017       Recent Labs   Lab Test  09/14/17   1145  09/07/17   1021  09/01/17   1623   NA   --   139  143   POTASSIUM   --   3.9  3.6   CHLORIDE   --   104  108   CO2   --   28  29   ANIONGAP   --   7  7   GLC   --   111*  117*   BUN  15  13  12   CR   --   0.85  0.82   ELMO   --   8.6  8.6     Liver Function Studies -   Recent Labs   Lab Test  09/07/17   1021   PROTTOTAL  7.3   ALBUMIN  3.3* "   BILITOTAL  0.5   ALKPHOS  70   AST  18   ALT  22          ASSESSMENT AND PLAN:      Newly diagnosed high-grade B cell lymphoma: She has a right paraspinal mass invading the T10 vertebral body, necrotic subcarinal nodes, and a hypermetabolic left infra hilar node on PET scan. This was initially concerning for recurrent breast cancer but biopsy of T10 shows lymphoma.     She needs to complete staging with bone marrow biopsy and likely an LP given the paraspinal location of the primary mass. TTE as anthracyclines will be part of her therapy. Additional labs needed are LDH, uric acid, SPEP, Hep B/C, and HIV.     Cytogenetics are pending to determine if this is double or triple hit, which would change the treatment.    I have discussed with her that I would like her to see my partner Dr Sauceda, a lymphoma expert.  We will arrange for staging evaluation, and to have her see Sohail; we discussed briefly that pt will need echo, port, and bone marrow.  I discussed that she will need systemic chemotherapy; the exact type will depend on the cytogenetics and Dr Sauceda's decision.      Stage I, ER/OR-positive, HER2-negative breast cancer: dx in 2011. Oncotype recurrence score of 11 (7% recurrence risk after 5 years of tamoxifen). S/p bilateral mastectomies and BENJIE/BSO. For endocrine therapy she was on arimidex from 9807-2532, then changed to tamoxifen b/c of arthralgias on Arimidex.  I discussed that as she starts systemic chemotherapy; she should hold her tamoxifen.     Papillary thyroid cancer, it is being followed by Dr. Avelina Castro.  She underwent etoh ablation x3 so far.        Synchronous thyroid cancer and breast cancer.  She is brca 1 and 2 negative.  Additional testing is negative.       Pain - likely exacerbated by compression of T10 nerve root compression.  She is using oxycodone up to 20 mg q 4 hours prn in addition to her usual regimen with ms contin and decadron 4 mg bid.  She is scheduled to see our  palliative specialists tomorrow.  She has enough oxycodone to get to this appt.    I did discuss that she could be admitted for pain control and worked up as an inpatient.  She declined admission.    Iron deficiency anemia: she's had a gastric bypass and has a G-J anastomosis so this may be malabsorption. EGD negative for ulcers;She has had a colonoscopy recently.  We discussed that she may benefit from IV iron.      Discussed emotional support.  Her daughter and a friend were with her today.  She is the primary breadwinner and insurance owner, and likely will need to talk to  at some point.      Shahla Crawley MD

## 2017-09-25 NOTE — MR AVS SNAPSHOT
After Visit Summary   9/25/2017    Tisha Arias    MRN: [id number]           Patient Information     Date Of Birth          1960        Visit Information        Provider Department      9/25/2017 4:00 PM Shahla Crawley MD [facility]        Today's Diagnoses     B-cell lymphoma of intrapelvic lymph nodes, unspecified B-cell lymphoma type (H)    -  1       Follow-ups after your visit        Your next 10 appointments already scheduled     Sep 27, 2017  8:15 AM CDT   NEW ONC with Garima Sauceda MD   [facility] ([facility])    [address] [place] [id number]   [phone]            Sep 27, 2017  9:45 AM CDT   [facility] Lab Draw with  [employer] LAB DRAW   [facility] Lab Draw ([facility])    [address] [place] [address]   [phone]            Oct 04, 2017  7:00 AM CDT   (Arrive by 6:45 AM)   Return Visit with Edu Benitez MD   [facility] ([facility])    [address] [place] [address]   [phone]            Nov 02, 2017  5:00 PM CDT   US HEAD NECK SOFT TISSUE with UCUS3   [facility] US ([facility])    [address] [place] [address]   [phone]           Please bring a list of your medicines (including vitamins, minerals and over-the-counter drugs). Also, tell your doctor about any allergies you may have. Wear comfortable clothes and leave your valuables at home.  You do not need to do anything special to prepare for your exam.  Please call the Imaging Department at your exam site with any questions.            Nov 02, 2017  6:00 PM CDT   LAB with  LAB   [facility])    [address] [place] [address]    [phone]           Patient must bring picture ID. Patient should be prepared to give a urine specimen  Please do not eat 10-12 hours before your appointment if you are coming in fasting for labs on lipids, cholesterol, or glucose (sugar). Pregnant women should follow their Care Team instructions. Water with medications is okay. Do not drink coffee or other fluids. If you have concerns about taking  your medications, please ask at office or if scheduling via [email or web address], send a message by clicking on Secure Messaging, Message Your Care Team.            Nov 13, 2017  4:30 PM CST   (Arrive by 4:15 PM)   RETURN ENDOCRINE with Avelina Castro MD   [facility] ([facility])    [address] [place] [address]  3rd [facility] [id number] [phone]            Nov 27, 2017  4:00 PM CST   (Arrive by 3:45 PM)   Return Visit with Shahla Crawley MD   [facility] ([facility])    [address] [place] [address]  [address] [facility] [address] [phone]              Future tests that were ordered for you today     Open Future Orders        Priority Expected Expires Ordered    HIV Antigen Antibody Combo Routine 9/29/2017 9/25/2018 9/25/2017    Hepatitis B Surface Antibody Routine 9/29/2017 9/25/2018 9/25/2017    Hepatitis B surface antigen Routine 9/29/2017 9/25/2018 9/25/2017    Hepatitis C antibody Routine 9/29/2017 9/25/2018 9/25/2017    Echocardiogram Complete Routine 9/27/2017 9/25/2018 9/25/2017    IR Chest Port Placement > 5 Yrs of Age Routine 9/27/2017 9/25/2018 9/25/2017    Comprehensive metabolic panel Routine 9/27/2017 9/25/2018 9/25/2017    IgM Routine 9/27/2017 9/25/2018 9/25/2017    IgG Routine 9/27/2017 9/25/2018 9/25/2017    IgA Routine 9/27/2017 9/25/2018 9/25/2017    Lactate Dehydrogenase Routine 9/27/2017 9/25/2018 9/25/2017    Uric acid Routine 9/27/2017 9/25/2018 9/25/2017    Protein electrophoresis Routine 9/27/2017  "9/25/2018 9/25/2017    *CBC with platelets differential Routine 9/27/2017 9/25/2018 9/25/2017    Bone marrow biopsy Routine  3/24/2018 9/25/2017    Leukemia Lymphoma Evaluation (Flow Cytometry) Routine  3/24/2018 9/25/2017    CHROMOSOME BONE MARROW With Professional Interpretation Routine  3/24/2018 9/25/2017    Process and hold DNA - Bone Marrow Routine  3/24/2018 9/25/2017            Who to contact     If you have questions or need follow up information about today's clinic visit or your schedule please contact [facility] directly at [phone].  Normal or non-critical lab and imaging results will be communicated to you by Haileohart, letter or phone within 4 business days after the clinic has received the results. If you do not hear from us within 7 days, please contact the clinic through in2nitet or phone. If you have a critical or abnormal lab result, we will notify you by phone as soon as possible.  Submit refill requests through Syncbak or call your pharmacy and they will forward the refill request to us. Please allow 3 business days for your refill to be completed.          Additional Information About Your Visit        Syncbak Information     Syncbak gives you secure access to your electronic health record. If you see a primary care provider, you can also send messages to your care team and make appointments. If you have questions, please call your primary care clinic.  If you do not have a primary care provider, please call [phone] and they will assist you.        Care EveryWhere ID     This is your Care EveryWhere ID. This could be used by other organizations to access your [place] medical records  [id number]        Your Vitals Were     Pulse Temperature Respirations Height Pulse Oximetry BMI (Body Mass Index)    80 99.2  F (37.3  C) (Oral) 16 1.651 m (5' 5\") 96% 32.15 kg/m2       Blood Pressure from Last 3 Encounters:   09/25/17 132/90   09/15/17 107/70   09/15/17 142/86    " Weight from Last 3 Encounters:   09/25/17 87.6 kg (193 lb 3.2 oz)   09/15/17 87.9 kg (193 lb 12.6 oz)   09/14/17 87.9 kg (193 lb 12.6 oz)               Primary Care Provider Office Phone # Fax #    Shahla Raul Crawley [phone]       [address] [place] [address]        Equal Access to Services     RODRIGO ZAMORA : Hadii aad ku hadasho Soomaali, waaxda luqadaha, qaybta kaalmada adeegyada, waxay idiin hayaan adeeg kharachance latrey ah. So [facility] [place] [phone].    ATENCIÓN: Si lance mccollum, tiene a stiles disposición servicios gratuitos de asistencia lingüística. [facility] [phone].    We comply with applicable federal civil rights laws and [place] laws. We do not discriminate on the basis of race, color, national origin, age, disability sex, sexual orientation or gender identity.            Thank you!     Thank you for choosing [facility]  for your care. Our goal is always to provide you with excellent care. Hearing back from our patients is one way we can continue to improve our services. Please take a few minutes to complete the written survey that you may receive in the mail after your visit with us. Thank you!             Your Updated Medication List - Protect others around you: Learn how to safely use, store and throw away your medicines at www.disposemymeds.org.          This list is accurate as of: 9/25/17 10:18 PM.  Always use your most recent med list.                   Brand Name Dispense Instructions for use Diagnosis    ACAI PO      Take 1,000 mg by mouth 2 times daily    Breast cancer, unspecified laterality, Thyroid cancer (H), Chronic arthralgias of knees and hips, unspecified laterality       aluminum chloride 20 % external solution    DRYSOL    60 mL    Apply topically At Bedtime    Rash, Intertrigo       AZMACORT IN      Inhale 2 puffs into the lungs as needed        BENADRYL PO      Take 50 mg by mouth as needed        BETAINE HCL PO      Take 2  tablets by mouth as needed Betaine HCL and Pepsin: Take 1-7 tabs by mouth daily, if burning take one tablet less once daily. Betaine HCL 1.04 g Pepsin 40 mg        calcitRIOL 0.25 MCG capsule    ROCALTROL    270 capsule    2 tabs in am and 1 tab qhs    Postsurgical hypothyroidism, Papillary carcinoma, follicular variant (H), Metastasis to cervical lymph node (H)       CALCIUM CITRATE +D 315-250 MG-UNIT Tabs per tablet   Generic drug:  calcium citrate-vitamin D     120 tablet    Take 2 tablets by mouth 3 times daily        celecoxib 200 MG capsule    celeBREX    180 capsule    Take 1 capsule (200 mg) by mouth 2 times daily    Chest wall pain       chlorhexidine 0.12 % solution    PERIDEX          * Colchicine 0.6 MG Caps     270 capsule    Take 0.6 mg by mouth 3 times daily for costochondritis (chest) discomfort. Scale back use to prn with loose stools or bloating.    Costal chondritis       * COLCRYS 0.6 MG tablet   Generic drug:  colchicine      Take 1.6 mg by mouth 3 times daily        COMPOUNDED NON-CONTROLLED SUBSTANCE - PHARMACY TO MIX COMPOUNDED MEDICATION    CMPD RX    120 g    Apply small amount to affected area two times daily.Ketamine 6% + ketoprofen 10% + lidocaine 10% in Lipo.    Chest wall pain       cromolyn 4 % ophthalmic solution    OPTICROM    10 mL    Place 1 drop into both eyes 4 times daily    Idiopathic mast cell activation syndrome (H)       cyclobenzaprine 10 MG tablet    FLEXERIL     Take 10 mg by mouth 3 times daily as needed On hold Dr Monsalve        dexamethasone 4 MG tablet    DECADRON    10 tablet    Take 1 tablet (4 mg) by mouth 2 times daily (with meals)    Spine metastasis (H), Hilar adenopathy       diazepam 5 MG tablet    VALIUM    90 tablet    Take 1 tablet (5 mg) by mouth 3 times daily as needed For muscle spasms    Chest wall pain       diclofenac 1 % Gel topical gel    VOLTAREN    100 g    Apply affected area two times daily PRN using enclosed dosing card.    Myofascial pain        docusate sodium 100 MG tablet    COLACE    60 tablet    Take 100 mg by mouth daily    Acute constipation       EPINEPHrine 0.3 MG/0.3ML injection 2-pack    EPIPEN 2-CARIDAD    0.6 mL    Inject 0.3 mLs (0.3 mg) into the muscle once as needed for anaphylaxis        hydrochlorothiazide 12.5 MG capsule    MICROZIDE    90 capsule    Take 1 capsule (12.5 mg) by mouth daily    Hypocalcemia       levothyroxine 112 MCG tablet    SYNTHROID/LEVOTHROID    189 tablet    Mon-Sat: (2 tabs daily) Sunday: 3 tabs    Postsurgical hypothyroidism, Papillary carcinoma, follicular variant (H), Postsurgical hypoparathyroidism (H), Thyroid cancer (H)       lidocaine 5 % ointment    XYLOCAINE    35.44 g    SANJANA TOPICALLY QID PRN    Chest wall pain       Lidocaine HCl Monohydrate Powd           MAGNESIUM CITRATE PO      Take 400 mg by mouth daily        methocarbamol 750 MG tablet    ROBAXIN    360 tablet    Take 1 tablet (750 mg) by mouth 4 times daily as needed . Ok to take a 5th Robaxin on very severe days.    Myofascial pain       mometasone 110 MCG/INH inhaler    ASMANEX 30 METERED DOSES    3 Inhaler    Inhale 1 puff into the lungs daily    Intermittent asthma, uncomplicated       montelukast 10 MG tablet    SINGULAIR    120 tablet    TAKE TWO TABLETS BY MOUTH TWICE DAILY    Idiopathic mast cell activation syndrome (H)       * morphine 30 MG 12 hr tablet    MS CONTIN    90 tablet    Take 1 tablet (30 mg) by mouth every 8 hours    Chest wall pain       * morphine 15 MG 12 hr tablet    MS CONTIN    30 tablet    Take 1 tablet (15 mg) by mouth At Bedtime . Take with 30 mg tab for a total of 45 mg of MS Contin at bedtime.    Neoplasm related pain       NASALCROM 5.2 MG/ACT Aers Inhaler   Generic drug:  cromolyn sodium     1 Bottle    SPRAY ONE SPRAY( 1 ML) IN NOSTRIL DAILY    Mast cell disease, systemic (H)       OMEGA 3 PO      Take 5 mLs by mouth        ondansetron 4 MG ODT tab    ZOFRAN ODT    60 tablet    Take 1-2 tablets (4-8 mg) by mouth  every 8 hours as needed for nausea or vomiting    Nausea with vomiting       * order for DME     2 Units    Equipment being ordered: compression stockings. 20-30 mm or 30 - 40 mm as patient can tolerate. Physical therapy to determine if they should be above or below the knee.    Venous stasis       * order for DME     2 Device    Equipment being ordered: compression bra    Malignant neoplasm of right female breast, unspecified site of breast       oxyCODONE 10 MG IR tablet    ROXICODONE    180 tablet    Take 1.5-2 tablets (15-20 mg) by mouth every 4 hours as needed    Neoplasm related pain       polyethylene glycol powder    MIRALAX    510 g    Take 17 g by mouth daily    Acute constipation       potassium chloride SA 20 MEQ CR tablet    KLOR-CON    90 tablet    Take 1 tablet (20 mEq) by mouth daily    Hypokalemia       PROBIOTIC DAILY PO      Take 1 capsule by mouth daily Lacto acid bifidobacterium    Breast cancer, unspecified laterality, Thyroid cancer (H), Chronic arthralgias of knees and hips, unspecified laterality       ranitidine 75 MG tablet    ZANTAC    270 tablet    Take 1 tablet (75 mg) by mouth 3 times daily    Mast cell disease, systemic (H)       SUMAtriptan 50 MG tablet    IMITREX    5 tablet    Take 1 tablet (50 mg) by mouth at onset of headache for migraine (may repeat in 2 hours as needed, max 2 tablets daily)    Migraine without status migrainosus, not intractable, unspecified migraine type       tacrolimus 0.1 % ointment    PROTOPIC    60 g    Apply topically 2 times daily    Rash, Intertrigo       tamoxifen 20 MG tablet    NOLVADEX    90 tablet    Take 1 tablet (20 mg) by mouth daily    Malignant neoplasm of female breast, unspecified laterality, unspecified site of breast       triamcinolone 0.025 % ointment    KENALOG    80 g    Apply topically 2 times daily Mix with 1 lb jar of vaseline or aquaphor    Intertrigo, Rash       TUMS 500 MG chewable tablet   Generic drug:  calcium carbonate      180 chew tab    Take 2 tablets (1,000 mg) by mouth nightly as needed for other (cramps)        * UNABLE TO FIND      1 tablet 3 times daily MEDICATION NAME: calcium D-Glucarate        * UNABLE TO FIND      2 tablets 3 times daily MEDICATION NAME: Natural D-Hist    Breast cancer, unspecified laterality, Papillary carcinoma, follicular variant (H), Chronic musculoskeletal pain       * UNABLE TO FIND      MEDICATION NAME: Tumeric 3 capsules daily        * UNABLE TO FIND      1 tablet 3 times daily MEDICATION NAME: Digestzymes    Thyroid cancer (H), Postsurgical hypothyroidism, Postsurgical hypoparathyroidism (H)       * UNABLE TO FIND      1 tablet daily MEDICATION NAME: Pure Encapsulations    Thyroid cancer (H), Postsurgical hypothyroidism, Postsurgical hypoparathyroidism (H)       VENTOLIN  (90 BASE) MCG/ACT Inhaler   Generic drug:  albuterol     18 g    INHALE 2 PUFFS INTO THE LUNGS EVERY 4 HOURS AS NEEDED FOR SHORTNESS OF BREATH OR DIFFICULT BREATHING OR WHEEZING    Mild intermittent asthma without complication       vitamin D3 2000 UNITS Caps     60 capsule    Take 10,000 Units by mouth daily    Thyroid cancer (H), Postsurgical hypothyroidism, Papillary carcinoma, follicular variant (H), Postsurgical hypoparathyroidism (H)       ZYRTEC ALLERGY 10 MG tablet   Generic drug:  cetirizine     90 tablet    Take 1 tablet (10 mg) by mouth 3 times daily On hold for lab test.        * Notice:  This list has 11 medication(s) that are the same as other medications prescribed for you. Read the directions carefully, and ask your doctor or other care provider to review them with you.

## 2017-09-26 ENCOUNTER — CARE COORDINATION (OUTPATIENT)
Dept: ONCOLOGY | Facility: CLINIC | Age: 57
End: 2017-09-26

## 2017-09-26 DIAGNOSIS — C85.10 HIGH GRADE B-CELL LYMPHOMA (H): Primary | ICD-10-CM

## 2017-09-26 NOTE — PROGRESS NOTES
Received request from Dr. Crawley to help with ordering of BMBX with sedation for scheduling dept. Pt seeing Dr. Sauceda tomorrow for new dx of lymphoma. Maya-op worksheet order entered.

## 2017-09-27 ENCOUNTER — OFFICE VISIT (OUTPATIENT)
Dept: TRANSPLANT | Facility: CLINIC | Age: 57
End: 2017-09-27
Payer: COMMERCIAL

## 2017-09-27 ENCOUNTER — CARE COORDINATION (OUTPATIENT)
Dept: ONCOLOGY | Facility: CLINIC | Age: 57
End: 2017-09-27

## 2017-09-27 ENCOUNTER — NURSE TRIAGE (OUTPATIENT)
Dept: NURSING | Facility: CLINIC | Age: 57
End: 2017-09-27

## 2017-09-27 ENCOUNTER — TELEPHONE (OUTPATIENT)
Dept: PALLIATIVE CARE | Facility: CLINIC | Age: 57
End: 2017-09-27

## 2017-09-27 ENCOUNTER — OFFICE VISIT (OUTPATIENT)
Dept: PALLIATIVE CARE | Facility: CLINIC | Age: 57
End: 2017-09-27
Attending: INTERNAL MEDICINE
Payer: COMMERCIAL

## 2017-09-27 ENCOUNTER — RADIANT APPOINTMENT (OUTPATIENT)
Dept: CARDIOLOGY | Facility: CLINIC | Age: 57
End: 2017-09-27
Attending: INTERNAL MEDICINE

## 2017-09-27 DIAGNOSIS — R07.89 CHEST WALL PAIN: ICD-10-CM

## 2017-09-27 DIAGNOSIS — C85.16 B-CELL LYMPHOMA OF INTRAPELVIC LYMPH NODES, UNSPECIFIED B-CELL LYMPHOMA TYPE (H): ICD-10-CM

## 2017-09-27 DIAGNOSIS — G89.3 NEOPLASM RELATED PAIN: Primary | ICD-10-CM

## 2017-09-27 DIAGNOSIS — C83.32 DIFFUSE LARGE B-CELL LYMPHOMA OF INTRATHORACIC LYMPH NODES (H): Primary | ICD-10-CM

## 2017-09-27 DIAGNOSIS — C85.10 HIGH GRADE B-CELL LYMPHOMA (H): Primary | ICD-10-CM

## 2017-09-27 LAB
ALBUMIN SERPL-MCNC: 3.7 G/DL (ref 3.4–5)
ALP SERPL-CCNC: 66 U/L (ref 40–150)
ALT SERPL W P-5'-P-CCNC: 26 U/L (ref 0–50)
ANION GAP SERPL CALCULATED.3IONS-SCNC: 6 MMOL/L (ref 3–14)
AST SERPL W P-5'-P-CCNC: 12 U/L (ref 0–45)
BASOPHILS # BLD AUTO: 0 10E9/L (ref 0–0.2)
BASOPHILS NFR BLD AUTO: 0.1 %
BILIRUB SERPL-MCNC: 0.6 MG/DL (ref 0.2–1.3)
BUN SERPL-MCNC: 15 MG/DL (ref 7–30)
CALCIUM SERPL-MCNC: 8.3 MG/DL (ref 8.5–10.1)
CHLORIDE SERPL-SCNC: 104 MMOL/L (ref 94–109)
CO2 SERPL-SCNC: 30 MMOL/L (ref 20–32)
COPATH REPORT: NORMAL
CREAT SERPL-MCNC: 0.86 MG/DL (ref 0.52–1.04)
DIFFERENTIAL METHOD BLD: ABNORMAL
EOSINOPHIL # BLD AUTO: 0 10E9/L (ref 0–0.7)
EOSINOPHIL NFR BLD AUTO: 0.4 %
ERYTHROCYTE [DISTWIDTH] IN BLOOD BY AUTOMATED COUNT: 14.5 % (ref 10–15)
GFR SERPL CREATININE-BSD FRML MDRD: 68 ML/MIN/1.7M2
GLUCOSE SERPL-MCNC: 91 MG/DL (ref 70–99)
HBV SURFACE AB SERPL IA-ACNC: 1.57 M[IU]/ML
HBV SURFACE AG SERPL QL IA: NONREACTIVE
HCT VFR BLD AUTO: 37.9 % (ref 35–47)
HCV AB SERPL QL IA: NONREACTIVE
HGB BLD-MCNC: 11.3 G/DL (ref 11.7–15.7)
HIV 1+2 AB+HIV1 P24 AG SERPL QL IA: NONREACTIVE
HIV 1+2 AB+HIV1 P24 AG SERPL QL IA: NONREACTIVE
IGA SERPL-MCNC: 186 MG/DL (ref 70–380)
IGG SERPL-MCNC: 755 MG/DL (ref 695–1620)
IGM SERPL-MCNC: 35 MG/DL (ref 60–265)
IMM GRANULOCYTES # BLD: 0.1 10E9/L (ref 0–0.4)
IMM GRANULOCYTES NFR BLD: 0.5 %
INR PPP: 1.02 (ref 0.86–1.14)
LDH SERPL L TO P-CCNC: 206 U/L (ref 81–234)
LYMPHOCYTES # BLD AUTO: 2.5 10E9/L (ref 0.8–5.3)
LYMPHOCYTES NFR BLD AUTO: 25.2 %
MCH RBC QN AUTO: 24 PG (ref 26.5–33)
MCHC RBC AUTO-ENTMCNC: 29.8 G/DL (ref 31.5–36.5)
MCV RBC AUTO: 81 FL (ref 78–100)
MONOCYTES # BLD AUTO: 0.6 10E9/L (ref 0–1.3)
MONOCYTES NFR BLD AUTO: 6.4 %
NEUTROPHILS # BLD AUTO: 6.7 10E9/L (ref 1.6–8.3)
NEUTROPHILS NFR BLD AUTO: 67.4 %
NRBC # BLD AUTO: 0 10*3/UL
NRBC BLD AUTO-RTO: 0 /100
PLATELET # BLD AUTO: 232 10E9/L (ref 150–450)
POTASSIUM SERPL-SCNC: 3.4 MMOL/L (ref 3.4–5.3)
PROT SERPL-MCNC: 7.2 G/DL (ref 6.8–8.8)
RBC # BLD AUTO: 4.7 10E12/L (ref 3.8–5.2)
SODIUM SERPL-SCNC: 140 MMOL/L (ref 133–144)
URATE SERPL-MCNC: 5 MG/DL (ref 2.6–6)
WBC # BLD AUTO: 9.9 10E9/L (ref 4–11)

## 2017-09-27 PROCEDURE — 85025 COMPLETE CBC W/AUTO DIFF WBC: CPT | Performed by: INTERNAL MEDICINE

## 2017-09-27 PROCEDURE — 85610 PROTHROMBIN TIME: CPT

## 2017-09-27 PROCEDURE — 99214 OFFICE O/P EST MOD 30 MIN: CPT | Mod: ZP | Performed by: INTERNAL MEDICINE

## 2017-09-27 PROCEDURE — 93005 ELECTROCARDIOGRAM TRACING: CPT

## 2017-09-27 PROCEDURE — 86803 HEPATITIS C AB TEST: CPT | Performed by: INTERNAL MEDICINE

## 2017-09-27 PROCEDURE — 87799 DETECT AGENT NOS DNA QUANT: CPT

## 2017-09-27 PROCEDURE — 84550 ASSAY OF BLOOD/URIC ACID: CPT | Performed by: INTERNAL MEDICINE

## 2017-09-27 PROCEDURE — 86706 HEP B SURFACE ANTIBODY: CPT | Performed by: INTERNAL MEDICINE

## 2017-09-27 PROCEDURE — 82784 ASSAY IGA/IGD/IGG/IGM EACH: CPT | Performed by: INTERNAL MEDICINE

## 2017-09-27 PROCEDURE — 87340 HEPATITIS B SURFACE AG IA: CPT | Performed by: INTERNAL MEDICINE

## 2017-09-27 PROCEDURE — 36415 COLL VENOUS BLD VENIPUNCTURE: CPT

## 2017-09-27 PROCEDURE — 93010 ELECTROCARDIOGRAM REPORT: CPT | Performed by: INTERNAL MEDICINE

## 2017-09-27 PROCEDURE — 80053 COMPREHEN METABOLIC PANEL: CPT | Performed by: INTERNAL MEDICINE

## 2017-09-27 PROCEDURE — 99212 OFFICE O/P EST SF 10 MIN: CPT | Mod: ZF

## 2017-09-27 PROCEDURE — 84165 PROTEIN E-PHORESIS SERUM: CPT | Performed by: INTERNAL MEDICINE

## 2017-09-27 PROCEDURE — 83615 LACTATE (LD) (LDH) ENZYME: CPT | Performed by: INTERNAL MEDICINE

## 2017-09-27 PROCEDURE — 87389 HIV-1 AG W/HIV-1&-2 AB AG IA: CPT

## 2017-09-27 PROCEDURE — 99211 OFF/OP EST MAY X REQ PHY/QHP: CPT | Mod: 27

## 2017-09-27 PROCEDURE — 00000402 ZZHCL STATISTIC TOTAL PROTEIN: Performed by: INTERNAL MEDICINE

## 2017-09-27 PROCEDURE — 87389 HIV-1 AG W/HIV-1&-2 AB AG IA: CPT | Performed by: INTERNAL MEDICINE

## 2017-09-27 RX ORDER — MORPHINE SULFATE 30 MG/1
30 TABLET, FILM COATED, EXTENDED RELEASE ORAL EVERY 8 HOURS
Qty: 120 TABLET | Refills: 0 | Status: ON HOLD | OUTPATIENT
Start: 2017-09-27 | End: 2017-10-26

## 2017-09-27 RX ORDER — LIDOCAINE/PRILOCAINE 2.5 %-2.5%
CREAM (GRAM) TOPICAL PRN
Qty: 30 G | Refills: 1 | Status: ON HOLD | OUTPATIENT
Start: 2017-09-27 | End: 2017-10-27

## 2017-09-27 RX ORDER — OXYCODONE HYDROCHLORIDE 10 MG/1
15-20 TABLET ORAL EVERY 4 HOURS PRN
Qty: 180 TABLET | Refills: 0 | Status: ON HOLD | OUTPATIENT
Start: 2017-09-27 | End: 2017-10-11

## 2017-09-27 RX ADMIN — Medication 3 ML: at 12:30

## 2017-09-27 NOTE — NURSING NOTE
"Oncology Rooming Note    September 27, 2017 7:53 AM   Tisha Arias is a 57 year old female who presents for:    Chief Complaint   Patient presents with     Oncology Clinic Visit     Patient is here relating to Pain     Initial Vitals: There were no vitals taken for this visit. Estimated body mass index is 32.15 kg/(m^2) as calculated from the following:    Height as of 9/25/17: 1.651 m (5' 5\").    Weight as of 9/25/17: 87.6 kg (193 lb 3.2 oz). There is no height or weight on file to calculate BSA.  Data Unavailable Comment: Data Unavailable   No LMP recorded. Patient has had a hysterectomy.  Allergies reviewed: Yes  Medications reviewed: No    Medications: Medication refills not needed today.  Pharmacy name entered into Sign[employer]: [id number] - [place], [place] - [address] [facility] [facility] [place]    Clinical concerns: RN told me not to do vitals for patient. Patient refused rooming process. RN and provider are aware.    3 minutes for nursing intake (face to face time)     Verito Pelletier LPN            "

## 2017-09-27 NOTE — NURSING NOTE
Chief Complaint   Patient presents with     Blood Draw     labs drawn     Labs drawn by venipuncture. Patient tolerated well.     SAL Elliott RN, BSN

## 2017-09-27 NOTE — MR AVS SNAPSHOT
After Visit Summary   9/27/2017    Tisha Arias    MRN: [id number]           Patient Information     Date Of Birth          1960        Visit Information        Provider Department      9/27/2017 8:15 AM Garima Sauceda MD [facility]        Today's Diagnoses     Diffuse large B-cell lymphoma of intrathoracic lymph nodes (H)    -  1          Cl[facility] Center ([facility])  [address] [place] [address]  Phone: [phone]  Clinic Hours:   Monday-Thursday:7am to 7pm   Friday: 7am to 5pm   Weekends and holidays:    8am to noon (in general)  If your fever is 100.5  or greater,   call the clinic.  After hours call the   hospital at [phone] or   [phone]. Ask for the BMT   fellow on-call            Follow-ups after your visit        Your next 10 appointments already scheduled     Oct 04, 2017   Procedure with Jc Goodman PA-C   [facility] ([facility])    [address] [place] [address] [phone]           Located in the [facility] at [address] [place] [address].   parking is very convenient and highly recommended.  is a $6 flat rate fee.  Both  and self parkers should enter the main arrival plaza from [facility]; parking attendants will direct you based on your parking preference.            Oct 04, 2017  8:00 AM CDT   (Arrive by 6:30 AM)   IR CHEST PORT PLACEMENT > 5 YRS OF AGE with [id number]   [facility] ([facility])    [address] [place] [id number]              1. Your doctor will need to do a history and physical within 7 days before this procedure. 2. Your doctor will which medications should not be taken the morning of the exam. 3. Laboratory tests are to be obtained by your doctor prior to the exam (Basic Metabolic Panel,  CBCP, PTT and INR) (No labs needed if you are having a tunneled catheter exchange or removal) 4. If you have allergies to x-ray contrast or iodine, contact your doctor or a Radiology nurse prior to the exam day for instructions. 5. Someone will need to drive you to and from the hospital. 6. If you are or may be pregnant, contact your doctor or a Radiology nurse prior to the day of the exam. 7. If you have diabetes, check with your doctor or a Radiology nurse to see if your insulin needs to be adjusted for the exam. 8. If you are taking a medication called Glucophage or Glucovance; these medications need to be held the day of the exam and for approximately 48 hours following. A blood sample must be drawn so your creatinine level can be checked before resuming this medication. 9. If you are taking Coumadin (to thin you blood) please contact your doctor or a Radiology nurse at least 3 days before the exam for special instructions. 10. You should not have received contrast within 48 hours of this exam. 11. The day before your exam you may eat your regular diet and are encouraged to drink at least 2 quarts of clear liquids. Drink no alcoholic beverages for 24 hours prior to the exam. 12. If you have a colostomy you will need to irrigate it with tap water at 8PM the evening before and again at 6AM the morning of the exam. 13. Do not smoke for 24 hours prior to the procedure. 14. Birth to 4 years: - Breast feeding must be stopped 4 hours prior to exam - Solid food or formula must be stopped 6 hours prior to exam - Tube feedings must be stopped 6 hours prior to exam 15. 4-10 years old: - Nothing to eat or drink 6 hours prior to exam 16. 10+ years old: - Nothing to eat or drink 8 hours prior to exam 17. The morning of the exam you may brush your teeth and take medications as directed with a sip of water. 18. When discharged, you cannot drive until morning, and an adult must be with you until then. You should stay in the [facility] overnight. 19. Bring a list of all drugs you are taking; include supplements and over-the-counter medications. Wear comfortable clothes and leave your valuables at home.            Oct 04, 2017  1:30 PM CDT   MR MYOCARDIUM W CONTRAST with [id number]   [facility] [place] [place] ([facility] [place])    [address] [facility] [address]   [phone]           Take your medicines as usual, unless your doctor tells you not to. Bring a list of your current medicines to your exam (including vitamins, minerals and over-the-counter drugs).  You will be given intravenous contrast for this exam. To prepare:   The day before your exam, drink extra fluids at least six 8-ounce glasses (unless your doctor tells you to restrict your fluids).   Have a blood test (creatinine test) within 30 days of your exam. Go to your clinic or Diagnostic Imaging Department for this test.  The MRI machine uses a strong magnet. Please wear clothes without metal (snaps, zippers). A sweatsuit works well, or we may give you a hospital gown.  Please remove any body piercings and hair extensions before you arrive. You will also remove watches, jewelry, hairpins, wallets, dentures, partial dental plates and hearing aids. You may wear contact lenses, and you may be able to wear your rings. We have a safe place to keep your personal items, but it is safer to leave them at home.   **IMPORTANT** THE INSTRUCTIONS BELOW ARE ONLY FOR THOSE PATIENTS WHO HAVE BEEN TOLD THEY WILL RECEIVE SEDATION OR GENERAL ANESTHESIA DURING THEIR MRI PROCEDURE:  IF YOU WILL RECEIVE SEDATION (take medicine to help you relax during your exam):   You must get the medicine from your doctor before you arrive. Bring the medicine to the exam. Do not take it at home.   Arrive one hour early. Bring someone who can take you home after the test. Your medicine will make you sleepy. After the exam, you may not drive, take a bus or  take a taxi by yourself.   No eating 8 hours before your exam. You may have clear liquids up until 4 hours before your exam. (Clear liquids include water, clear tea, black coffee and fruit juice without pulp.)  IF YOU WILL RECEIVE ANESTHESIA (be asleep for your exam):   Arrive 1 1/2 hours early. Bring someone who can take you home after the test. You may not drive, take a bus or take a taxi by yourself.   No eating 8 hours before your exam. You may have clear liquids up until 4 hours before your exam. (Clear liquids include water, clear tea, black coffee and fruit juice without pulp.)  Please call the Imaging Department at your exam site with any questions.            Nov 02, 2017  5:00 PM CDT   US HEAD NECK SOFT TISSUE with UCUS3   [facility] ([facility])    [address] [place] [id number] [phone]           Please bring a list of your medicines (including vitamins, minerals and over-the-counter drugs). Also, tell your doctor about any allergies you may have. Wear comfortable clothes and leave your valuables at home.  You do not need to do anything special to prepare for your exam.  Please call the Imaging Department at your exam site with any questions.            Nov 02, 2017  6:00 PM CDT   LAB with  LAB   [facility] ([facility])    [address] [place] [id number] [phone]           Patient must bring picture ID. Patient should be prepared to give a urine specimen  Please do not eat 10-12 hours before your appointment if you are coming in fasting for labs on lipids, cholesterol, or glucose (sugar). Pregnant women should follow their Care Team instructions. Water with medications is okay. Do not drink coffee or other fluids. If you have concerns about taking  your medications, please ask at office or if scheduling via [employer], send a message by clicking on Secure Messaging,  Message Your Care Team.            Nov 13, 2017  4:30 PM CST   (Arrive by 4:15 PM)   RETURN ENDOCRINE with Avelina Castro MD   [facility] ([facility])    [address]  3rd Floor  [facility] [address] [phone]            Nov 27, 2017  4:00 PM CST   (Arrive by 3:45 PM)   Return Visit with Shahla Crawley MD   [facility] ([facility])    [address]  2nd Floor  [facility] [address] [phone]              Who to contact     If you have questions or need follow up information about today's clinic visit or your schedule please contact [facility] directly at [phone].  Normal or non-critical lab and imaging results will be communicated to you by [employer], letter or phone within 4 business days after the clinic has received the results. If you do not hear from us within 7 days, please contact the clinic through [employer] or phone. If you have a critical or abnormal lab result, we will notify you by phone as soon as possible.  Submit refill requests through [employer] or call your pharmacy and they will forward the refill request to us. Please allow 3 business days for your refill to be completed.          Additional Information About Your Visit        [employer] Information     [employer] gives you secure access to your electronic health record. If you see a primary care provider, you can also send messages to your care team and make appointments. If you have questions, please call your primary care clinic.  If you do not have a primary care provider, please call [phone] and they will assist you.        Care EveryWhere ID     This is your Care EveryWhere ID. This could be used by other organizations to access your [facility] medical records  [id number]         Blood Pressure from Last 3 Encounters:   10/03/17 125/72   10/02/17 115/77   09/25/17 132/90    Weight from Last 3  Encounters:   10/03/17 87.7 kg (193 lb 4.8 oz)   09/25/17 87.6 kg (193 lb 3.2 oz)   09/15/17 87.9 kg (193 lb 12.6 oz)              We Performed the Following     EBV DNA PCR Quantitative Whole Blood     EKG 12-lead complete w/read - Clinics - NOW     HIV Antigen Antibody Combo     INR     Leukemia Lymphoma Evaluation (Flow Cytometry)          Today's Medication Changes          These changes are accurate as of: 9/27/17 11:59 PM.  If you have any questions, ask your nurse or doctor.               Start taking these medicines.        Dose/Directions    lidocaine-prilocaine cream   Commonly known as:  EMLA   Used for:  Neoplasm related pain, Chest wall pain   Started by:  Edu Benitez MD        Apply topically as needed (port access pain.)   Quantity:  30 g   Refills:  1         These medicines have changed or have updated prescriptions.        Dose/Directions    morphine 30 MG 12 hr tablet   Commonly known as:  MS CONTIN   This may have changed:    - additional instructions  - Another medication with the same name was removed. Continue taking this medication, and follow the directions you see here.   Used for:  Neoplasm related pain   Changed by:  Edu Benitez MD        Dose:  30 mg   Take 1 tablet (30 mg) by mouth every 8 hours . Take an addition pill at night such that your evening dose is 60mg   Quantity:  120 tablet   Refills:  0            Where to get your medicines      These medications were sent to [place], [place] - [address] [place] [id number]    Hours:  TRANSPLANT PHONE NUMBER [phone] Phone:  [phone]     lidocaine-prilocaine cream         Some of these will need a paper prescription and others can be bought over the counter.  Ask your nurse if you have questions.     Bring a paper prescription for each of these medications     COMPOUNDED NON-CONTROLLED SUBSTANCE - PHARMACY TO MIX COMPOUNDED  MEDICATION    morphine 30 MG 12 hr tablet    oxyCODONE 10 MG IR tablet                Recent Review Flowsheet Data     BMT Recent Results Latest Ref Rng & Units 4/26/2017 9/1/2017 9/6/2017 9/7/2017 9/14/2017 9/27/2017 10/2/2017    WBC 4.0 - 11.0 10e9/L - 5.2 - 3.7(L) 7.1 9.9 7.7    Hemoglobin 11.7 - 15.7 g/dL - 11.0(L) - 10.5(L) 11.0(L) 11.3(L) 10.7(L)    Platelet Count 150 - 450 10e9/L - 225 - 258 319 232 214    Neutrophils (Absolute) 1.6 - 8.3 10e9/L - 3.3 - 1.9 - 6.7 -    INR 0.86 - 1.14 - 1.00 - - 1.02 1.02 1.14    Sodium 133 - 144 mmol/L - 143 - 139 - 140 -    Potassium 3.4 - 5.3 mmol/L - 3.6 - 3.9 - 3.4 3.6    Chloride 94 - 109 mmol/L - 108 - 104 - 104 -    Glucose 70 - 99 mg/dL - 117(H) 108(H) 111(H) - 91 -    Urea Nitrogen 7 - 30 mg/dL - 12 - 13 15 15 -    Creatinine 0.52 - 1.04 mg/dL 0.83 0.82 - 0.85 - 0.86 -    Calcium (Total) 8.5 - 10.1 mg/dL 8.3(L) 8.6 - 8.6 - 8.3(L) -    Protein (Total) 6.8 - 8.8 g/dL - 7.6 - 7.3 - 7.2 -    Albumin 3.4 - 5.0 g/dL - 3.5 - 3.3(L) - 3.7 -    Alkaline Phosphatase 40 - 150 U/L - 74 - 70 - 66 -    AST 0 - 45 U/L - 23 - 18 - 12 -    ALT 0 - 50 U/L - 26 - 22 - 26 -    MCV 78 - 100 fl - 81 - 79 82 81 80               Primary Care Provider Office Phone # Fax #    Shahla Crawley [phone]       [address] [place] [address]  [facility] [address]        Equal Access to Services     RODRIGO ZAMORA : Kevin Venegas, girish ge, timmy lama, ranjan martin. So [facility] [phone].    ATENCIÓN: Si habla español, tiene a stiles disposición servicios gratuitos de asistencia lingüística. Llame al [phone].    We comply with applicable federal civil rights laws and [place] laws. We do not discriminate on the basis of race, color, national origin, age, disability, sex, sexual orientation, or gender identity.            Thank you!     Thank you for choosing [facility]  for your care. Our  goal is always to provide you with excellent care. Hearing back from our patients is one way we can continue to improve our services. Please take a few minutes to complete the written survey that you may receive in the mail after your visit with us. Thank you!             Your Updated Medication List - Protect others around you: Learn how to safely use, store and throw away your medicines at www.disposemymeds.org.          This list is accurate as of: 9/27/17 11:59 PM.  Always use your most recent med list.                   Brand Name Dispense Instructions for use Diagnosis    ACAI PO      Take 1,000 mg by mouth 2 times daily    Breast cancer, unspecified laterality, Thyroid cancer (H), Chronic arthralgias of knees and hips, unspecified laterality       aluminum chloride 20 % external solution    DRYSOL    60 mL    Apply topically At Bedtime    Rash, Intertrigo       AZMACORT IN      Inhale 2 puffs into the lungs as needed        BENADRYL PO      Take 50 mg by mouth as needed        BETAINE HCL PO      Take 2 tablets by mouth as needed Betaine HCL and Pepsin: Take 1-7 tabs by mouth daily, if burning take one tablet less once daily. Betaine HCL 1.04 g Pepsin 40 mg        calcitRIOL 0.25 MCG capsule    ROCALTROL    270 capsule    2 tabs in am and 1 tab qhs    Postsurgical hypothyroidism, Papillary carcinoma, follicular variant (H), Metastasis to cervical lymph node (H)       CALCIUM CITRATE +D 315-250 MG-UNIT Tabs per tablet   Generic drug:  calcium citrate-vitamin D     120 tablet    Take 2 tablets by mouth 3 times daily        celecoxib 200 MG capsule    celeBREX    180 capsule    Take 1 capsule (200 mg) by mouth 2 times daily    Chest wall pain       chlorhexidine 0.12 % solution    PERIDEX          Colchicine 0.6 MG Caps     270 capsule    Take 0.6 mg by mouth 3 times daily for costochondritis (chest) discomfort. Scale back use to prn with loose stools or bloating.    Costal chondritis       COMPOUNDED  NON-CONTROLLED SUBSTANCE - PHARMACY TO MIX COMPOUNDED MEDICATION    CMPD RX    120 g    Apply small amount to affected area two times daily.Ketamine 6% + ketoprofen 10% + lidocaine 10% in Lipo.    Neoplasm related pain       cromolyn 4 % ophthalmic solution    OPTICROM    10 mL    Place 1 drop into both eyes 4 times daily    Idiopathic mast cell activation syndrome (H)       cyclobenzaprine 10 MG tablet    FLEXERIL     Take 10 mg by mouth 3 times daily as needed On hold Dr Monsalve        diazepam 5 MG tablet    VALIUM    90 tablet    Take 1 tablet (5 mg) by mouth 3 times daily as needed For muscle spasms    Chest wall pain       diclofenac 1 % Gel topical gel    VOLTAREN    100 g    Apply affected area two times daily PRN using enclosed dosing card.    Myofascial pain       docusate sodium 100 MG tablet    COLACE    60 tablet    Take 100 mg by mouth daily    Acute constipation       EPINEPHrine 0.3 MG/0.3ML injection 2-pack    EPIPEN 2-CARIDAD    0.6 mL    Inject 0.3 mLs (0.3 mg) into the muscle once as needed for anaphylaxis        hydrochlorothiazide 12.5 MG capsule    MICROZIDE    90 capsule    Take 1 capsule (12.5 mg) by mouth daily    Hypocalcemia       levothyroxine 112 MCG tablet    SYNTHROID/LEVOTHROID    189 tablet    Mon-Sat: (2 tabs daily) Sunday: 3 tabs    Postsurgical hypothyroidism, Papillary carcinoma, follicular variant (H), Postsurgical hypoparathyroidism (H), Thyroid cancer (H)       lidocaine 5 % ointment    XYLOCAINE    35.44 g    SANJANA TOPICALLY QID PRN    Chest wall pain       Lidocaine HCl Monohydrate Powd           lidocaine-prilocaine cream    EMLA    30 g    Apply topically as needed (port access pain.)    Neoplasm related pain, Chest wall pain       MAGNESIUM CITRATE PO      Take 400 mg by mouth daily        methocarbamol 750 MG tablet    ROBAXIN    360 tablet    Take 1 tablet (750 mg) by mouth 4 times daily as needed . Ok to take a 5th Robaxin on very severe days.    Myofascial pain        mometasone 110 MCG/INH inhaler    ASMANEX 30 METERED DOSES    3 Inhaler    Inhale 1 puff into the lungs daily    Intermittent asthma, uncomplicated       montelukast 10 MG tablet    SINGULAIR    120 tablet    TAKE TWO TABLETS BY MOUTH TWICE DAILY    Idiopathic mast cell activation syndrome (H)       morphine 30 MG 12 hr tablet    MS CONTIN    120 tablet    Take 1 tablet (30 mg) by mouth every 8 hours . Take an addition pill at night such that your evening dose is 60mg    Neoplasm related pain       NASALCROM 5.2 MG/ACT Aers Inhaler   Generic drug:  cromolyn sodium     1 Bottle    SPRAY ONE SPRAY( 1 ML) IN NOSTRIL DAILY    Mast cell disease, systemic       OMEGA 3 PO      Take 5 mLs by mouth        ondansetron 4 MG ODT tab    ZOFRAN ODT    60 tablet    Take 1-2 tablets (4-8 mg) by mouth every 8 hours as needed for nausea or vomiting    Nausea with vomiting       * order for DME     2 Units    Equipment being ordered: compression stockings. 20-30 mm or 30 - 40 mm as patient can tolerate. Physical therapy to determine if they should be above or below the knee.    Venous stasis       * order for DME     2 Device    Equipment being ordered: compression bra    Malignant neoplasm of right female breast, unspecified site of breast       oxyCODONE 10 MG IR tablet    ROXICODONE    180 tablet    Take 1.5-2 tablets (15-20 mg) by mouth every 4 hours as needed    Neoplasm related pain       polyethylene glycol powder    MIRALAX    510 g    Take 17 g by mouth daily    Acute constipation       potassium chloride SA 20 MEQ CR tablet    KLOR-CON    90 tablet    Take 1 tablet (20 mEq) by mouth daily    Hypokalemia       PROBIOTIC DAILY PO      Take 1 capsule by mouth daily Lacto acid bifidobacterium    Breast cancer, unspecified laterality, Thyroid cancer (H), Chronic arthralgias of knees and hips, unspecified laterality       ranitidine 75 MG tablet    ZANTAC    270 tablet    Take 1 tablet (75 mg) by mouth 3 times daily    Mast cell  disease, systemic       SUMAtriptan 50 MG tablet    IMITREX    5 tablet    Take 1 tablet (50 mg) by mouth at onset of headache for migraine (may repeat in 2 hours as needed, max 2 tablets daily)    Migraine without status migrainosus, not intractable, unspecified migraine type       tacrolimus 0.1 % ointment    PROTOPIC    60 g    Apply topically 2 times daily    Rash, Intertrigo       tamoxifen 20 MG tablet    NOLVADEX    90 tablet    Take 1 tablet (20 mg) by mouth daily    Malignant neoplasm of female breast, unspecified laterality, unspecified site of breast       triamcinolone 0.025 % ointment    KENALOG    80 g    Apply topically 2 times daily Mix with 1 lb jar of vaseline or aquaphor    Intertrigo, Rash       TUMS 500 MG chewable tablet   Generic drug:  calcium carbonate     180 chew tab    Take 2 tablets (1,000 mg) by mouth nightly as needed for other (cramps)        * UNABLE TO FIND      1 tablet 3 times daily MEDICATION NAME: calcium D-Glucarate        * UNABLE TO FIND      2 tablets 3 times daily MEDICATION NAME: Natural D-Hist    Breast cancer, unspecified laterality, Papillary carcinoma, follicular variant (H), Chronic musculoskeletal pain       * UNABLE TO FIND      MEDICATION NAME: Tumeric 3 capsules daily        * UNABLE TO FIND      1 tablet 3 times daily MEDICATION NAME: Digestzymes    Thyroid cancer (H), Postsurgical hypothyroidism, Postsurgical hypoparathyroidism (H)       * UNABLE TO FIND      1 tablet daily MEDICATION NAME: Pure Encapsulations    Thyroid cancer (H), Postsurgical hypothyroidism, Postsurgical hypoparathyroidism (H)       VENTOLIN  (90 BASE) MCG/ACT Inhaler   Generic drug:  albuterol     18 g    INHALE 2 PUFFS INTO THE LUNGS EVERY 4 HOURS AS NEEDED FOR SHORTNESS OF BREATH OR DIFFICULT BREATHING OR WHEEZING    Mild intermittent asthma without complication       vitamin D3 2000 UNITS Caps     60 capsule    Take 10,000 Units by mouth daily    Thyroid cancer (H), Postsurgical  hypothyroidism, Papillary carcinoma, follicular variant (H), Postsurgical hypoparathyroidism (H)       ZYRTEC ALLERGY 10 MG tablet   Generic drug:  cetirizine     90 tablet    Take 1 tablet (10 mg) by mouth 3 times daily On hold for lab test.        * Notice:  This list has 7 medication(s) that are the same as other medications prescribed for you. Read the directions carefully, and ask your doctor or other care provider to review them with you.

## 2017-09-27 NOTE — LETTER
"9/27/2017       RE: Tisha Arias  [address] [place] [address]     Dear Colleague,    Thank you for referring your patient, Tisha Arias, to the [facility] at [facility]. Please see a copy of my visit note below.    Palliative Staff/Outpatient Clinic    (This note was transcribed using voice recognition software. While I review and edit the transcription, I may miss errors. Please let me know of any serious mis-transcriptions and I will addend this note.)    CC/Patient ID:  Patient ID: she has long-standing chronic complex myofascial chest wall pain after breast cancer surgery and reconstruction   Saw Dr England (at my insistence) earlier in 2016 for a 2nd opinion - he recommended modest opioid increases which we did and it only seemed to improve her situation temporarily  Mood disturbance in this context   Follows closely also with acupuncture, massage, & psychotherapy.   Is diagnosed also with MCAS and followed with Dr Avendaño  Thyroid cancer s/p resection, ablations.  Sept 2017 - she developed escalating sternal and back pain and had a CT in ED, then PET showing R 10th rib,T10 mass, mediastinal and hilar lymphadenopathy -->  High grade B cell lymphoma  _____________________  History:  She is with her  and psychotherapist today. I'm seeing her urgently because her pain continues to be poor. Notably she also describes new \"L5, S1\" pain that's been there about a week. Nonradiating. Strength in her legs, ambulation, ADLs are normal.    She's taking MS Contin 30 mg in the morning, 30 in the afternoon, and now 40 5 at night. Increase to 45 at night was not clearly effective. She takes 10-20 mg of oxycodone several times during the day. She takes 10 mg at a time at work, mostly, out of concerns for feeling tired at work and having poor performance. She does not however that she had a severe episode yesterday afternoon at work and took " 20 mg of oxycodone and felt like her performance was enhanced by this, insofar as her pain was much better and she had no side effects from the oxycodone.     However her biggest problem is pain at night with sleeping and she feels like she needs to rash in the oxycodone due to only getting 180 a months and and so she's been limiting the amount she is taking at night. Dr. Humphreys told her to take more. Stopping dexamethasone seemed to worsen her pain but she's been back on it for a couple days. She continues on the celecoxib and colchicine and diclofenac gel and lidocaine gel. She is worried about pain with port accessing especially given her long-standing myofascial and neurologic chest wall pain. She is continuing to have trouble getting insurance to cover her compounded cream-I wrote a letter recently and there is a appeal that is pending.    SH:   Reviewed and unchanged. Working in employee health at [employer]    ROS:  Mildly constipated.    PE: There were no vitals taken for this visit.  Alert NAD  Affect flat, but good eye contact, clear sensorium, memory and thought processes wnl    Impression & Recommendations:  57-year-old woman with long-standing complex myofascial chest wall pain after mastectomy who now has back pain related to a poorly differentiated B-cell lymphoma that's still in the process of being worked up, treatment plan pending.    Increase MS Contin to 60 mg at night. I think she is using the oxycodone safely with minimal side effects, were performance is adequate on them, and I'm okay with her liberalizing her pill number during the day. As long as she is not having sedating side effects she can take up to 20 mg 4 times a day if asked what she needs. She's been using them safely, responsibly. She should continue her other medications as described above. We are watching her closely given her high risk medication plan with opioids and benzodiazepines, and she is doing well from that standpoint. I got  her some EMLA cream for port accessing. Follow-up next week.    -The above was transcribed using voice recognition software. While I review and edit the transcription, I may miss errors. Please let me know of any serious mis-transcriptions and I will addend this note.    Chart data reviewed today:    Family History   Problem Relation Age of Onset     Allergies Mother      Arthritis Mother      CANCER Mother      uterine/uterine     Hypertension Mother      on HCTZ     Hyperlipidemia Mother      CEREBROVASCULAR DISEASE Mother      s/p brain surgery     Breast Cancer Mother      Depression Mother      Anxiety Disorder Mother      Anesthesia Reaction Mother      Asthma Mother      Skin Cancer Mother      HEART DISEASE Father      DIABETES Father      Coronary Artery Disease Father      Hypertension Father      Hyperlipidemia Father      CEREBROVASCULAR DISEASE Father      Multiple strokes     Obesity Father      DIABETES Brother      Depression Brother      Hypertension Brother      CANCER Maternal Grandfather      pancreatic cancer/stomach cancer     Prostate Cancer Maternal Grandfather      Prostrate and Bladder     Other Cancer Maternal Grandfather      Pancreatic 1988, Bladder 1977     CANCER Maternal Grandmother      uterine     Breast Cancer Maternal Grandmother      Skin Cancer Maternal Grandmother      CANCER Brother 57     pancreatic cancer     DIABETES Brother      Breast Cancer Cousin      Grand mothers sister     Other Cancer Brother      Stage 3 Pancreatic 2-2015     Depression Brother      Asthma Brother      Obesity Brother      Anesthesia Reaction Other      H/a, itching, drug interactions     Asthma Other      CANCER Brother      Pancreatic      Thyroid Disease No family hx of      Past Medical History:   Diagnosis Date     Allergic rhinitis due to animal dander      Asthma      Breast cancer (H) 1/30/12    right     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 No real need for Epipen for this alone.     GDM (gestational diabetes mellitus)     w first pregnancy only     Gestational hypertension      Lymphedema     jackson arms, chest     Migraine      Neuropathy associated with cancer (H)      Papillary carcinoma, follicular variant (H) 2013    pT3, N1, Mx, thyroid     Post-surgical hypothyroidism      Renal disease     kidney stones     Rhinitis, allergic to other allergen      Right Breast mass and microcalcifications 2011     Seasonal allergic conjunctivitis      Seasonal allergic rhinitis 11 skin tests pos. for: dog/M/T/G/W--NEGATIVE FOOD TESTS FOR: shrimp, crab, lobster, coconut     Past Surgical History:   Procedure Laterality Date     BACK SURGERY  ,    Bulging disc w nerve root impigment     BIOPSY BREAST      right     BIOPSY BREAST  11    right, core and sterotactic     BYPASS GASTRIC, CHOLECYSTECTOMY, COMBINED       C LAPAROSCOPIC GASTRIC RESTRICTIVE PX, W/GASTRIC BYPASS/ GAMALIEL-EN-Y, < 150CM      Gamaliel en Y?      SECTION       COLONOSCOPY      normal     COSMETIC SURGERY  2012    Final Stage of Mastectomy     DAVINCI HYSTERECTOMY TOTAL, BILATERAL SALPINGO-OOPHORECTOMY, COMBINED  2012    Procedure: COMBINED DAVINCI HYSTERECTOMY TOTAL, SALPINGO-OOPHORECTOMY;  Davinci Total Laparoscopic Hysterectomy, Bilateral Salpingo Oophorectomy, Pelvic Washings, Cystoscopy;  Surgeon: Evie Sheikh MD;  Location: [facility] OR     ENT SURGERY       ESOPHAGOSCOPY, GASTROSCOPY, DUODENOSCOPY (EGD), COMBINED N/A 2017    Procedure: COMBINED ENDOSCOPIC ULTRASOUND, ESOPHAGOSCOPY, GASTROSCOPY, DUODENOSCOPY (EGD), FINE NEEDLE ASPIRATE/BIOPSY;  Esophagogastroduodenoscopy, Endoscopic Ultrasound, with fine needle biopsy aspirate;  Surgeon: Patrick Robles MD;  Location: [facility] OR     GI  SURGERY  11-    Rachael-en Y w cholecystectomy     GYN SURGERY  1/6/89,1/10/92    2 c-sections     HYSTERECTOMY, PAP NO LONGER INDICATED  12/12    DeVinci assister lap hyst with BSO     IRRIGATION AND DEBRIDEMENT BREAST  3/1/2012    Procedure:IRRIGATION AND DEBRIDEMENT BREAST; Irrigation and Debridement, Wound Closure Right Breast; Surgeon:JUNG GUILLEN; Location:[facility] OR     MASTECTOMY, RECONSTRUCT BREAST, COMBINED  1/30/2012    Procedure:COMBINED MASTECTOMY, RECONSTRUCT BREAST; Bilateral Mastectomies, Right Axillary [place] Node Biopsy, Bilateral Breast Reconstruction with Tissue Expanders, Reconstruction of inframammary fold, bilateral pain management systems; Surgeon:ANUPAM CAMPBELL; Location:[facility] OR     RECONSTRUCT BREAST  8/31/2012    Procedure: RECONSTRUCT BREAST;  Bilateral 2nd stage breast reconstruction, revision,       SOFT TISSUE SURGERY  1-30-12    Mastectomy w severe myofascial pain syndrome     THYROIDECTOMY  7/10/2013    Procedure: THYROIDECTOMY;  Total Thyroidectomy with central neck dissection;  Surgeon: Indiana Sneed MD;  Location: [facility] OR     TONSILLECTOMY      childhood     Allergies   Allergen Reactions     Baclofen Other (See Comments) and Unknown     Other reaction(s): Edema, chest pain, seizures.      No Clinical Screening - See Comments Shortness Of Breath, Palpitations, Anaphylaxis, Itching, Swelling, Difficulty breathing and Rash     Sukhjinder wipes- oral allergy -  July 2015: throat tightness from a Chinese herbal medicine Wilmer Tran     Serotonin Anxiety, Other (See Comments) and Swelling     Seizures      Amitriptyline Hcl Swelling     Birch Trees      Potatoes, carrots, cherries, celery, apple, pears, plums, peaches, parsnip, kiwi, hazelnuts, and apricots,      Blue Dyes Itching     Headaches       Cymbalta Other (See Comments)     Flushing, tremor/muscle twitching and edema     Gabapentin Other (See Comments)     edema  Systemic edema, weaned off from Feb to March per  Dr. Dowd.    edema     Grass      Mugwort [Artemisia Vulgaris]      Various spices     Ragweeds      Melons, bananas, cucumbers, zucchini.     Topamax      Nortriptyline Itching, Visual Disturbance, Swelling, GI Disturbance, Anxiety, Other (See Comments) and Nausea     Other reaction(s): Swelling          Medications:   I have reviewed this patient's medication profile.  MNPMP: reviewed      Data reviewed:  I reviewed recent labs and imaging, comments on pertinent findings:  Cr 0.85  QTc 480ms    Thank you for involving us in the patient's care.   Edu Benitez MD / Palliative Medicine / Pager [phone] / [facility] Inpatient Team Consult Pager [phone] (answered 8am-430pm M-F) - ok to text page via [employer] / After-Hours Answering Service [phone] / Palliative Clinic in the [facility] at the [facility] [place] - [phone] (scheduling); [phone] (triage).        Again, thank you for allowing me to participate in the care of your patient.      Sincerely,    Edu Benitez MD

## 2017-09-27 NOTE — NURSING NOTE
"Oncology Rooming Note    September 27, 2017 8:29 AM   Tisha Arias is a 57 year old female who presents for:    Chief Complaint   Patient presents with     Oncology Clinic Visit     Pt is here as New Eval for B-Cell Lymphoma--     Initial Vitals: There were no vitals taken for this visit. Estimated body mass index is 32.15 kg/(m^2) as calculated from the following:    Height as of 9/25/17: 1.651 m (5' 5\").    Weight as of 9/25/17: 87.6 kg (193 lb 3.2 oz). There is no height or weight on file to calculate BSA.  Data Unavailable Comment: Data Unavailable   No LMP recorded. Patient has had a hysterectomy.  Allergies reviewed: No  Medications reviewed: No    Medications: Medication refills not needed today.  Pharmacy name entered into [employer]: [id number] - [place], [place] - [address] [address]. [place]    Clinical concerns: Pt refused vitals and going over her medications     3 minutes for nursing intake (face to face time)     Gloria Negrete MA              "

## 2017-09-27 NOTE — PATIENT INSTRUCTIONS
Vascular Access Port Implantation   Port implantation is surgery to place (implant) a port under the skin. For vascular access, it is placed into a vein. The port allows medicines or nutrition to be sent right into your bloodstream. Blood can also be taken or given through the port. During the procedure, a long, thin tube called a catheter is threaded into one of your large veins. The tube is then attached to the port. This usually sits under the skin of your chest and causes a small bump. To use the port, a special needle is passed through your skin and into the port. The needle can stay in your skin for up to 7 days, if needed. A port can stay in place for weeks or months or longer.    Why is a vascular access port needed?  A vascular access port may allow healthcare providers to give you:    Chemotherapy or other cancer-fighting drugs    IV treatments, such as antibiotics or nutrition    Hemodialysis (for kidney failure)  The port may also be used to draw blood.  Before the procedure  Follow any instructions you are given on how to prepare.  Tell your provider about any medicines you are taking. This includes:    All prescription medicines    Over-the-counter medicines such as aspirin or ibuprofen    Herbs, vitamins, and other supplements  Also be sure your provider knows:    If you are pregnant or think you may be pregnant    If you are allergic to any medicines or substances, especially local anesthetics or iodine    Your full medical history, including why you will need the port    If you plan on doing any contact sports  During the procedure    Before the procedure, an IV may be put into a vein in your arm or hand. This gives you fluids and medicines. You may be given medicine through the IV to help you relax during the procedure. This is called sedation. But some surgeons place ports using general anesthesia.    The chest is used most often for the port. In some cases, your belly (abdomen) or arm will be  used instead.    The skin over the insertion area is numbed with local anesthetic.    Ultrasound or X-rays are used to help the healthcare provider guide the catheter into the proper location during the procedure.    A cut (incision) is made in the skin where the port will be placed. A small pocket for the port is formed under the skin.    A second small incision is made in the skin near the first incision. A tunnel under the skin is created. The catheter is put through the tunnel and into the blood vessel.    The skin is closed over the port. It is held shut with stitches (sutures) or surgical glue or tape. The second small incision is also closed.    A chest X-ray may be done to make sure the port is placed properly.  After the procedure  You may be taken to a recovery room where you ll recover from the sedation. Nurses will check on you as you rest. If you have pain, nurses can give you medicine. If you are not staying in the hospital overnight, you will be sent home a few hours after the procedure is done. A healthcare provider will tell you when you can go home. An adult family member or friend will need to drive you home.  Recovering at home    Take pain medicine as directed by your healthcare provider.    Take it easy for 24 hours after the procedure. Avoid physical activity and heavy lifting until your healthcare provider says it s OK.    Keep the port clean and dry. Ask when you can shower again. You will need to keep the port dry by covering it when you shower.    Care for the insertion site as you are directed.    Don t swim, bathe, or do other activities that cause water to cover the insertion site.    To keep the port from getting blocked with blood clots, flush it as often as directed. You should be shown the proper way to flush the port before you go home. It is important to follow these directions.     Risks and possible complications of implantation    Bleeding    Infection of the insertion site     "Damage to a blood vessel    Nerve injury or irritation    Collapsed lung (for chest port placements)    Skin breakdown over the port  Risks and possible complications of having a port    Blocked  port or catheter    Leakage or breakage of the port or catheter    The port moves out of position    Blood clot    Skin or bloodstream infection    Skin breakdown over the port      When to seek medical care  Call your healthcare provider right away if you have any of the following:    A fever of 100.4 F (38.0 C) or higher    You can't access or use the port properly    You can't flush the port or get a blood return    The skin near the port is red, warm, swollen, or broken    You have shoulder pain on the side where the port is located    You feel a heart flutter or racing heart     Swollen arm, if the port is placed in your arm   Date Last Reviewed: 7/1/2016 2000-2017 [employer]. [address] [place] [address]. All rights reserved. This information is not intended as a substitute for professional medical care. Always follow your healthcare professional's instructions.      Getting Ready for a Lumbar Puncture (Spinal Tap)  What is a spinal tap?  A lumbar puncture, or spinal tap, is a way to collect a sample of spinal fluid for testing.  We will insert a small needle between the vertebrae (bones) in your lower back. The needle allows fluid to drain from the sac that surrounds your spinal cord.  For some patients, chemotherapy is injected through the same needle into the spinal fluid. We will discuss this with you if it is necessary. This is called \"intrathecal chemotherapy.\"     How do I get ready for a spinal tap?  Follow your care team's guidelines for eating and drinking.  You may need to stop taking certain medicines before this exam. If so, we will tell you in advance.  Tell your care team:    If you have any allergies.    If there's a chance you may be pregnant.  What happens during a " spinal tap?  We may ask you to put on a hospital gown. Then, when it's time for the test:    You may lie on your stomach or on your side (with your knees to your chest and head tucked down). Sometimes the test is done while in a seated position.    We will cleanse your lower back and inject medicine to numb the area. You will feel some burning as the area gets numb.    We will then insert a needle into the spinal sac. Spinal fluid will drain through this needle into several tubes. Because the needle is small, it may take about 30 to 45 minutes to collect the fluid.    Once we have enough fluid, we will remove the needle.  What happens afterward?  Try to drink 2 to 3 quarts of fluid throughout the rest of the day.  You should rest for the entire day. You may return to your normal activities tomorrow.  Avoid any heavy lifting for 24 hours.  How will I feel?  The muscles in your lower back may feel sore. A heating pad or hot water bottle may help relieve your pain.  If you have a headache, it should go away over the next couple of days. Headaches are often more intense when you change position. Keep drinking extra fluids, including caffeine, and lie flat to rest. Tylenol may also help. Don't use aspirin unless your care team tells you it is okay to do so.  Please tell us if your headache lasts more than a couple of days. This can happen if the hole in your spine is slow to heal. To treat the problem, we will take a sample of your blood and inject it into your spine to plug the hole.  You may notice some bruising where we inserted the needle. This is not serious and should heal like any other bruise.  When should I call my care team?  Call your care team if you have:    Pain or discomfort that won't go away or that suddenly gets worse.    A fever over 100.5 F (38 C), taken under the tongue.    A stiff neck.    Severe nausea (feeling sick to your stomach) or vomiting (throwing up).  After business hours, call  [phone].    Neurology patients: Ask to page the neurology resident on call.    BMT patients: Ask to page the BMT fellow on call.    Other: __________________________________.  What are the risks?  There is a small chance of bleeding or infection. Your doctor will talk to you about these and other risks in advance.  When will I get my results?  Your care team will give you the results at your next visit. You or your insurer will then receive two bills: one from the hospital and one from the doctor who did your spinal tap.  Who should I call if I have questions?  Please call your doctor or  __________________________________________.  For informational purposes only. Not to replace the advice of your health care provider.  Copyright   1987, 2012 [facility]. All rights reserved. [employer] 576833   REV 01/16.    Bone Marrow Aspiration and Biopsy  Does this test have other names?  Bone marrow exam  What is this test?  This is a two-part test that looks at the blood cells in a sample of bone marrow, the spongy tissue within certain bones. This test may help your healthcare provider diagnose or monitor a blood disease or health condition affecting your marrow.  Your bone marrow has a liquid part and a solid part. Aspiration uses a needle to remove a sample of the liquid part of bone marrow. Biopsy uses a larger needle to remove a small amount of bone with its marrow.  Part of the job of bone marrow is to make blood cells. This test can find out how well your bone marrow is working. This test is also done to find some types of cancer.  Why do I need this test?  You might have this test if your healthcare provider wants to find out the health of your bone marrow or to check on how well your marrow is making blood cells.  You may have an aspiration to check for:    The health of your bone marrow for a transplant    Acute leukemia    Multiple myeloma  In some cases, bone marrow aspiration is used to confirm  chromosome disorders in newborns.  You may have an aspiration followed by a biopsy if you could have:    Bacterial, fungal, or parasitic infection    Unexplained anemia, leucopenia, or thrombocytopenia    Metastatic cancer or many other diseases  What other tests might I have along with this test?  Your healthcare provider may also order these tests:    Complete blood count, or CBC    Reticulocyte count to find out your red blood cell survival rate  What do my test results mean?  Many things may affect your lab test results. These include the method each lab uses to do the test. Even if your test results are different from the normal value, you may not have a problem. To learn what the results mean for you, talk with your healthcare provider.  The lab will look at different aspects of your bone marrow to help find certain diseases or conditions. These aspects include:    Type and number of blood cells    Any abnormalities in the size, shape, or look of cells    Level of iron in the bone marrow    Abnormal amount of young white blood cells, called blasts    Any chromosomal abnormalities  Depending on what is seen, your results may mean you have an infection, a blood disease, leukemia, or cancer that has spread to the bone marrow from another site.  Your healthcare provider will take your results and combine this information with information from your physical exam, health history, and other types of tests to make a diagnosis.   If your results are negative, your provider may order other tests to diagnose your condition.   How is this test done?  These tests require a sample of bone marrow. A number of sites on your body can be used for marrow aspiration, but the hip bone is a common spot. You will likely lie on your side or stomach on an exam table. Your healthcare provider will numb the area of the test. You may feel a slight prick from the needle that the provider uses to give the numbing agent.  Does this test  pose any risks?  It's not possible to numb the bone, so you may feel slight pain during the procedure. But you shouldn't feel any pain afterward. Risks from a bone marrow test are rare, but you could have bleeding or an infection.  What might affect my test results?  Other factors aren't likely to affect your results.  How do I get ready for this test?  Tell your healthcare provider if you take aspirin or have any allergies. Also tell your provider if you are pregnant, take any blood-thinner medicines, or have a history of bleeding problems.  Be sure your provider knows about all other medicines, herbs, vitamins, and supplements you are taking. This includes medicines that don't need a prescription and any illicit drugs you may use.       4150-5739 [employer]. [address] [place] [address], [place] [address]. All rights reserved. This information is not intended as a substitute for professional medical care. Always follow your healthcare professional's instructions.

## 2017-09-27 NOTE — PROGRESS NOTES
"Palliative Staff/Outpatient Clinic    (This note was transcribed using voice recognition software. While I review and edit the transcription, I may miss errors. Please let me know of any serious mis-transcriptions and I will addend this note.)    CC/Patient ID:  Patient ID: she has long-standing chronic complex myofascial chest wall pain after breast cancer surgery and reconstruction   Saw Dr England (at my insistence) earlier in 2016 for a 2nd opinion - he recommended modest opioid increases which we did and it only seemed to improve her situation temporarily  Mood disturbance in this context   Follows closely also with acupuncture, massage, & psychotherapy.   Is diagnosed also with MCAS and followed with Dr Avendaño  Thyroid cancer s/p resection, ablations.  Sept 2017 - she developed escalating sternal and back pain and had a CT in ED, then PET showing R 10th rib,T10 mass, mediastinal and hilar lymphadenopathy -->  High grade B cell lymphoma  _____________________  History:  She is with her  and psychotherapist today. I'm seeing her urgently because her pain continues to be poor. Notably she also describes new \"L5, S1\" pain that's been there about a week. Nonradiating. Strength in her legs, ambulation, ADLs are normal.    She's taking MS Contin 30 mg in the morning, 30 in the afternoon, and now 40 5 at night. Increase to 45 at night was not clearly effective. She takes 10-20 mg of oxycodone several times during the day. She takes 10 mg at a time at work, mostly, out of concerns for feeling tired at work and having poor performance. She does not however that she had a severe episode yesterday afternoon at work and took 20 mg of oxycodone and felt like her performance was enhanced by this, insofar as her pain was much better and she had no side effects from the oxycodone.     However her biggest problem is pain at night with sleeping and she feels like she needs to rash in the oxycodone due to only getting 180 a " months and and so she's been limiting the amount she is taking at night. Dr. Humphreys told her to take more. Stopping dexamethasone seemed to worsen her pain but she's been back on it for a couple days. She continues on the celecoxib and colchicine and diclofenac gel and lidocaine gel. She is worried about pain with port accessing especially given her long-standing myofascial and neurologic chest wall pain. She is continuing to have trouble getting insurance to cover her compounded cream-I wrote a letter recently and there is a appeal that is pending.    SH:   Reviewed and unchanged. Working in [employer]    ROS:  Mildly constipated.    PE: There were no vitals taken for this visit.  Alert NAD  Affect flat, but good eye contact, clear sensorium, memory and thought processes wnl    Impression & Recommendations:  57-year-old woman with long-standing complex myofascial chest wall pain after mastectomy who now has back pain related to a poorly differentiated B-cell lymphoma that's still in the process of being worked up, treatment plan pending.    Increase MS Contin to 60 mg at night. I think she is using the oxycodone safely with minimal side effects, were performance is adequate on them, and I'm okay with her liberalizing her pill number during the day. As long as she is not having sedating side effects she can take up to 20 mg 4 times a day if asked what she needs. She's been using them safely, responsibly. She should continue her other medications as described above. We are watching her closely given her high risk medication plan with opioids and benzodiazepines, and she is doing well from that standpoint. I got her some EMLA cream for port accessing. Follow-up next week.    -The above was transcribed using voice recognition software. While I review and edit the transcription, I may miss errors. Please let me know of any serious mis-transcriptions and I will addend this note.    Chart data reviewed  today:    Family History   Problem Relation Age of Onset     Allergies Mother      Arthritis Mother      CANCER Mother      uterine/uterine     Hypertension Mother      on HCTZ     Hyperlipidemia Mother      CEREBROVASCULAR DISEASE Mother      s/p brain surgery     Breast Cancer Mother      Depression Mother      Anxiety Disorder Mother      Anesthesia Reaction Mother      Asthma Mother      Skin Cancer Mother      HEART DISEASE Father      DIABETES Father      Coronary Artery Disease Father      Hypertension Father      Hyperlipidemia Father      CEREBROVASCULAR DISEASE Father      Multiple strokes     Obesity Father      DIABETES Brother      Depression Brother      Hypertension Brother      CANCER Maternal Grandfather      pancreatic cancer/stomach cancer     Prostate Cancer Maternal Grandfather      Prostrate and Bladder     Other Cancer Maternal Grandfather      Pancreatic 1988, Bladder 1977     CANCER Maternal Grandmother      uterine     Breast Cancer Maternal Grandmother      Skin Cancer Maternal Grandmother      CANCER Brother 57     pancreatic cancer     DIABETES Brother      Breast Cancer Cousin      Grand mothers sister     Other Cancer Brother      Stage 3 Pancreatic 2-2015     Depression Brother      Asthma Brother      Obesity Brother      Anesthesia Reaction Other      H/a, itching, drug interactions     Asthma Other      CANCER Brother      Pancreatic      Thyroid Disease No family hx of      Past Medical History:   Diagnosis Date     Allergic rhinitis due to animal dander      Asthma      Breast cancer (H) 1/30/12    right     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 No real need for Epipen for this alone.     GDM (gestational diabetes mellitus)     w first pregnancy only     Gestational hypertension      Lymphedema      jackson arms, chest     Migraine      Neuropathy associated with cancer (H)      Papillary carcinoma, follicular variant (H) 2013    pT3, N1, Mx, thyroid     Post-surgical hypothyroidism      Renal disease     kidney stones     Rhinitis, allergic to other allergen      Right Breast mass and microcalcifications 2011     Seasonal allergic conjunctivitis      Seasonal allergic rhinitis 11 skin tests pos. for: dog/M/T/G/W--NEGATIVE FOOD TESTS FOR: shrimp, crab, lobster, coconut     Past Surgical History:   Procedure Laterality Date     BACK SURGERY  ,    Bulging disc w nerve root impigment     BIOPSY BREAST      right     BIOPSY BREAST  11    right, core and sterotactic     BYPASS GASTRIC, CHOLECYSTECTOMY, COMBINED       C LAPAROSCOPIC GASTRIC RESTRICTIVE PX, W/GASTRIC BYPASS/ GAMALIEL-EN-Y, < 150CM      Gamaliel en Y?      SECTION       COLONOSCOPY      normal     COSMETIC SURGERY  2012    Final Stage of Mastectomy     DAVINCI HYSTERECTOMY TOTAL, BILATERAL SALPINGO-OOPHORECTOMY, COMBINED  2012    Procedure: COMBINED DAVINCI HYSTERECTOMY TOTAL, SALPINGO-OOPHORECTOMY;  Davinci Total Laparoscopic Hysterectomy, Bilateral Salpingo Oophorectomy, Pelvic Washings, Cystoscopy;  Surgeon: Evie Sheikh MD;  Location: [facility] OR     ENT SURGERY       ESOPHAGOSCOPY, GASTROSCOPY, DUODENOSCOPY (EGD), COMBINED N/A 2017    Procedure: COMBINED ENDOSCOPIC ULTRASOUND, ESOPHAGOSCOPY, GASTROSCOPY, DUODENOSCOPY (EGD), FINE NEEDLE ASPIRATE/BIOPSY;  Esophagogastroduodenoscopy, Endoscopic Ultrasound, with fine needle biopsy aspirate;  Surgeon: Patrick Robles MD;  Location: [facility] OR     GI SURGERY  2007    Gamaliel-en Y w cholecystectomy     GYN SURGERY  89,1/10/92    2 c-sections     HYSTERECTOMY, PAP NO LONGER INDICATED      DeVinci assister lap hyst with BSO     IRRIGATION AND DEBRIDEMENT BREAST  3/1/2012    Procedure:IRRIGATION AND DEBRIDEMENT BREAST; Irrigation  and Debridement, Wound Closure Right Breast; Surgeon:JUNG GUILLEN; Location:[facility] OR     MASTECTOMY, RECONSTRUCT BREAST, COMBINED  1/30/2012    Procedure:COMBINED MASTECTOMY, RECONSTRUCT BREAST; Bilateral Mastectomies, Right Axillary [place] Node Biopsy, Bilateral Breast Reconstruction with Tissue Expanders, Reconstruction of inframammary fold, bilateral pain management systems; Surgeon:ANUPAM CAMPBELL; Location:[facility] OR     RECONSTRUCT BREAST  8/31/2012    Procedure: RECONSTRUCT BREAST;  Bilateral 2nd stage breast reconstruction, revision,       SOFT TISSUE SURGERY  1-30-12    Mastectomy w severe myofascial pain syndrome     THYROIDECTOMY  7/10/2013    Procedure: THYROIDECTOMY;  Total Thyroidectomy with central neck dissection;  Surgeon: Indiana Sneed MD;  Location: [facility] OR     TONSILLECTOMY      childhood     Allergies   Allergen Reactions     Baclofen Other (See Comments) and Unknown     Other reaction(s): Edema, chest pain, seizures.      No Clinical Screening - See Comments Shortness Of Breath, Palpitations, Anaphylaxis, Itching, Swelling, Difficulty breathing and Rash     Sukhjinder wipes- oral allergy -  July 2015: throat tightness from a Chinese herbal medicine Wilmer Barry Tran     Serotonin Anxiety, Other (See Comments) and Swelling     Seizures      Amitriptyline Hcl Swelling     Birch Trees      Potatoes, carrots, cherries, celery, apple, pears, plums, peaches, parsnip, kiwi, hazelnuts, and apricots,      Blue Dyes Itching     Headaches       Cymbalta Other (See Comments)     Flushing, tremor/muscle twitching and edema     Gabapentin Other (See Comments)     edema  Systemic edema, weaned off from Feb to March per Dr. Dowd.    edema     Grass      Mugwort [Artemisia Vulgaris]      Various spices     Ragweeds      Melons, bananas, cucumbers, zucchini.     Topamax      Nortriptyline Itching, Visual Disturbance, Swelling, GI Disturbance, Anxiety, Other (See Comments) and Nausea     Other reaction(s):  Swelling          Medications:   I have reviewed this patient's medication profile.  MNPMP: reviewed      Data reviewed:  I reviewed recent labs and imaging, comments on pertinent findings:  Cr 0.85  QTc 480ms    Thank you for involving us in the patient's care.   Edu Benitez MD / Palliative Medicine / Pager [phone] / [facility] Inpatient Team Consult Pager [phone] (answered 8am-430pm M-F) - ok to text page via [employer] / After-Hours Answering Service [phone] / Palliative Clinic in the [facility] at the [facility] - [phone] (scheduling); [phone] (triage).

## 2017-09-27 NOTE — LETTER
9/27/2017      RE: Tisha Archer  [address] [place] [address]       HEMATOLOGIC CLINIC NOTE      HISTORY OF PRESENT ILLNESS:    Mrs. Archer is a 57-year-old patient with a diagnosis of breast cancer who has been care for by Dr. Shahla Crawley.  The patient was recently diagnosed with non-Hodgkin's lymphoma and is transferring her care to me.      The patient is a pleasant female who had an early-stage breast cancer diagnosed 2011, she underwent bilateral mastectomy with reconstruction and has been on tamoxifen. She did not have chemotherapy or radiation. She has suffered chronically from a pain syndrome involving the chest wall and back and [facility] with Dr. Benitez.  In august 2017, she presented to ER with dramatic worsening chest pain and back pain. Her workup was negative for cardiac and pulmonary issues.  However, the CT scan performed on 09/01/2017 demonstrated a new destructive lesion in the medial right tenth rib extending to the right T9-T10 neural foramen with vertebral body invasion.  There was associated conglomerate of lymphadenopathy around the mid T-spine. On 09/15 she underwent CT-guided biopsy of a T10 paraspinal mass on the right. The pathology report of the T8 vertebral body invading mass showed B-cell high-grade lymphoma.  The morphology shows a population of large cells, diffuse lymphoid infiltrate.  The cells are atypical with abundant mitotic and apoptotic activity and single cell necrosis.  Tumor is CD45 and CD79a positive and focally weakly CD30 positive.  The other stains revealed positivity for CD20, BCL6, BCL2 and MUM1, and CD10 and CD21 negative.  The CD5 and CD3 highlighted background T-cells.  MYC expression was equivocal with approximately 40% nuclei staining.  Ki-67 proliferative index is greater than 90-95%.  The immunohistochemistry is suggestive of non-GCB immunophenotype of diffuse large B-cell lymphoma.  The cytogenetics did not  show rearrangement of the BCL2 or c-MYC.  There is the presence of BCL6 rearrangement in 78% of cells and gains of MYC and BCL2, but no amplifications.       The patient has healed well from the biopsy.  She is continuing to work full-time.  She has ongoing chest wall pain and back pain and some improvement with dexamethasone 4 mg twice a day, which she took for the last week.       REVIEW OF SYSTEMS:  The review of systems revealed she is not well, tired, but no night sweats or fevers.  She had lost about 7 pounds but gained them back recently.  She had no visual changes or headaches.  She had no shortness of breath, dyspnea, orthopnea or palpitations.  She had chronic chest pain syndrome, but no weakness in her arms or legs.  She denies numbness or tingling in her extremities, either. The back-pain in throbbing at times.       PAST MEDICAL HISTORY:  T1c N0 M0 infiltrating ductal carcinoma of the right breast with a strong family history of breast cancer, BRCA1 and BRCA2 negative.  This was in 12/2011.  She is status post bilateral mastectomy with immediate reconstruction in 01/2012.  ER and WI were positive and HER2 was negative.  Other medical history includes papillary thyroid cancer, follicular variant, stage III, ANSELMO recurrence risk intermediate, treated with total thyroidectomy and CND and RRA.  She is currently treated postsurgically hypothyroid, on LP4 and levothyroxine.  She has hypocalcemia with a normal PTH possibly related to calcium malabsorption.  She has history of a past Rachael-en-Y.  She has a history of malabsorption of calcium, vitamin D and L-thyroxine.  The thyroid cancer was discovered in 2013.  The patient had a total thyroidectomy.  She had C-sections and a hysterectomy.  She has costochondritis, a history of gestational diabetes mellitus, and a history of gestational hypertension.  She has a history of lymphedema in both arms and chest.  She has a history of migraine headaches.  She has a  history of neuropathy associated with the cancer.  She has a history of kidney stones.  She has a history of allergic  rhinitis.      MEDICATIONS:   2.  Imitrex.    3.  Zofran.    4.  MS Contin 30 mg twice a day with oxycodone added as needed.    5.  Valium 5 mg 3 times a day.   6.  Robaxin p.r.n.    7.  Cromolyn   8.  Tamoxifen 20 mg a day.   9.  Singulair.   10.  Hydrochlorothiazide 12.5 mg capsule once a day.   11.  Levothyroxine 112 mcg a day.    12.  Digestive enzymes.    13.  Azmacort.    14.  MiraLax.    15.  Probiotics.       ALLERGIES:  She has multiple allergies or intolerances including amitriptyline, baclofen, birch trees, blue dye, Cymbalta, gabapentin causing edema, grass, nortriptyline, Topamax, and sertraline which causes anxiety and swelling.       FAMILY HISTORY:  Negative for blood cancer or lymphoma.  There is a strong family history of breast cancer.       PHYSICAL EXAMINATION:   GENERAL:  The patient is a middle-aged woman who appears her stated age.  She is oriented and appears anxious.    VITAL SIGNS:  Stable with a blood pressure of 132/90, pulse of 80, respirations of 16, and temperature of 98.2.   HEENT:  She has normal hair growth.  Anicteric sclerae.  Oropharynx is clear.  Good dentition.  Tongue is midline and non-coated.  No mucosal lesions or buccal lesions.    NECK:  Supple.  I do not feel any enlarged lymphadenopathy in the neck area.  No supraclavicular or infraclavicular lymph nodes.  No axillary lymph nodes.   CHEST:  Clear to auscultation bilaterally.   HEART:  Heart tones are regular, S1, S2.  No gallops, murmurs or rubs.   ABDOMEN:  Soft and nontender.  There is a scar post-hysterectomy.     CHEST WALL:  The chest wall is tender to palpation.  There is no bruising or ecchymosis.   EXTREMITIES:  Without swelling.  No inguinal lymph nodes.   NEURO exam without focal deficit.      LABORATORY:  I reviewed the laboratory results which showed mild anemia of 11.3 g/dL, white cell  count is normal, and platelet count is 232,000.  She has normal electrolytes.  Her creatinine is also at a normal level of 0.83 and liver function tests are all acceptable.        PATHOLOGY:  I reviewed her pathology report which was performed at [facility].       IMAGING:  PET scan was obtained on 09/06/2017.  This is concluded as a new 3.2-cm, hypermetabolic, soft tissue mass involving the right tenth rib with extension into the neural foramen and minimal bony erosion to the T10 adjacent body.  There are 3 new, enlarged, FDG-avid, mediastinal and hilar lymph nodes and 1-mm non-obstructive nephrolithiasis.  There is no disease present in the neck.  The mass measures 3.2 x 3.4 x 2.4 cm.      ASSESSMENT AND PLAN:  To summarize, Mrs. Archer is a 57-year-old female with a complex medical history, now presenting with a newly diagnosed, high-grade B-cell lymphoma, non-GCB with no evidence of    c-MYC or BCL6 rearrangement, but with overexpression of c-MYC by immunohistochemistry and mitotic index over 95%.  The patient had disease localized above the diaphragm in thoracic and paravertebral soft tissue and mediastinal lymphadenopathy.      Today I spent 60 minutes in consultation reviewing the diagnosis of non-Hodgkin lymphoma with aggressive features and treatment options.  I recommended to proceed with staging including a bone marrow biopsy and spinal tap to assess the CSF involvement.  The patient will also undergo an echocardiogram and EKG today in anticipation of anthracycline chemotherapy.             The treatment includes systemic chemotherapy; currently no indication for radiation therapy. My recommendation is to proceed with dose-adjusted R-EPOCH with rituximab with each cycle, and intrathecal chemotherapy given bone and paraspinal axial skeleton disease. The patient has about 50% probability to be cured with chemotherapy.  We discussed the chemotherapeutic agents including the expected toxicities and side effects  such as low counts, mucositis and fatigue.      I recommend she follow up with the Palliative Care team and the Pain team to continue to control her pain.  In the meantime, she will continue dexamethasone 4 mg b.i.d. for her back pain, as this can have antiedematous property.  The patient is a good candidate for chemotherapy. I am going to see her back with the results of the staging studies and we will start the chemotherapy within the next 7-10 days.  She will follow up with the endocrinologist as well.  The patient is a good candidate for the cardioprotective study and will be receiving an MRI of her myocardium this coming week.       Tisha and her  asked very good questions, and I hope she feels better soon. Thank you for referring this pleasant lady to me.       Garima Sauceda MD  Associate Professor of Medicine  [phone]          Garima Sauceda MD

## 2017-09-27 NOTE — PROGRESS NOTES
"Met with pt, , and friend/counselor after clinic visit/consultation appt with Dr. Sauceda.  Pt  verbalizes understanding of Dr. Sauceda's plan of care. Clarifying questions answered.  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   Introduced self and role of RN Care Coordinator at [facility].  Provided my contact information, [facility] Nurse Line (available 24/7) and [facility] Scheduling Line.    Reviewed sections of [facility] Cancer Care Guidebook, including \"Good to Know Numbers\" and \"When to Contact Your Provider.\"  Provided [facility]'s Easy Reading: If You Have NHL Cancer booklet  Provided overview of supportive services at [facility] including SW, oncology dietitian, oncology psychologist.   Provided our fax # for her employer HR dept to fax us any FMLA/STDisability forms she may need completed by our office.    Auth to Discuss PHI form NOTcompleted by pt - she preferred to bring it back to me at a later date    Learning assessment COMPLETED  Pt voiced understanding and appreciation of above information and denies any further questions, and he/she understands that I will follow and provide coordination as needed.    Discussed R-EPOCH (dose escalated) treatment plan for diagnosis: lymphoma    -Reviewed treatment schedule including:   admission to [facility] for 3-4 days each cycle, lab/SANJANA visits between cycles, Neulasta support (pt wants on-body Neulasta if possible to avoid an extra clinic visit), cycle length, lab monitoring and take home medications for nausea    Written information provided: (NOT REVIEWED in detail TODAY per RN and pt preference)    [facility] Drug Information R-EPOCH  -Provided literature on : rituximab, etoposide, cyclophosphamide, prednisone, vincristine, adriamycin, ARe Used For, How they are Given, Side Effects, When to Contact Your Doctor of Health Care Provider and Self Care Tips sections.     [facility] " literature: Getting Ready for Chemotherapy: What to Expect Before, During and After Your Treatment     Port teaching DONE with [facility] literature and port model book. Also sent in a [email or web address] message with LP and BMBX [facility] literature    Plan: Labs today. Scheduling working on scheduling BMBX and port placement same day in IR if possible. Also LP per today's recommendations from Dr. Sohail Sauceda also would like to refer to oncology dietitian for consultation due to hx of gastric bypass.  -Collaborated with scheduling team to answer questions re: scheduling of ordered tests, to include priority LP and BMBX, then port if appts cannot be combined.  -Message to Dr. Sauceda to clarify if EKG is needed, pt was under the impression she wanted this. Order located and notified  to pls add to appts 10/2.  -I will send message to oncology dietitian Cindy Snowden RD for consult request after above appts are scheduled.    Addendum: Pt returned to [facility] after ECHO appt in different dept and was upset that appts were not yet scheduled and about her cell phone service being off, requests we schedule all tests for this week. Explained to pt that our clinic coordinators are working to schedule the ordered tests as soon as possible based on availability of the various depts, and they will call her spouse (until her phone is working again) updates re: appts per her request. She also verified with me verbally that she will have access to check [employer] messages tonight and tomorrow.

## 2017-09-27 NOTE — TELEPHONE ENCOUNTER
"[facility]ub pharmacist calling requesting refill of dexamethasone.   Pharmacist is not sure if Patient is completely out of the medication, will give the Patient one tablet if the Patient is out of the med.   Per OV note \"She should continue her other medications as described above.\"   Will send a message to the clinic to address this refill tomorrow (9/28)      Reason for Disposition    [1] Prescription not at pharmacy AND [2] was prescribed today by PCP    Protocols used: MEDICATION QUESTION CALL-ADULT-    "

## 2017-09-27 NOTE — TELEPHONE ENCOUNTER
Received call from patient with questions regarding when new MS Contin script can be filled.     Last received #90 tabs of 30mg on 9/15/2017 + #30 tabs of 15mg on 9/21/2017    At 3 tabs daily [of 30mg MS Contin] x 13 days = patient has used 39 tabs   90 total tabs - 39 tabs = 51 tabs of 30mg MS Contin left  51 tabs / 4 tabs daily [dose increase] = 12.75 days left of 30mg tabs at new dosage (NOT including 15mg tabs)     Spoke with pharmacy. They agreed to release medication to patient next Wednesday, October 4th, per my request as patient is planning to be in clinic that day.     Called and discussed this with patient. Explained despite MD writing a new script, the Board of Pharmacy has regulations on when medication can be released - this is especially tightened due to opiate crisis. She verbalized agreement. We discussed that if patient is not going to be here next Wednesday I would be happy to help arrange for meds to be delivered to her if this helps (either via [employer] or ).

## 2017-09-27 NOTE — MR AVS SNAPSHOT
After Visit Summary   9/27/2017    Tisha Arias    MRN: [id number]           Patient Information     Date Of Birth          1960        Visit Information        Provider Department      9/27/2017 7:30 AM Edu Benitez MD [facility]        Today's Diagnoses     Neoplasm related pain    -  1    Chest wall pain           Follow-ups after your visit        Your next 10 appointments already scheduled     Sep 27, 2017  9:45 AM CDT   [facility] Lab Draw with [facility] LAB DRAW   [facility] Lab Draw ([facility])    [address]  [address] [facility] [id number] [phone]            Sep 27, 2017 11:30 AM CDT   Ech Complete with ECHCR1   [facility] Echo ([facility])    [address]  [address] [facility] [id number] [phone]           1. Please bring or wear a comfortable two-piece outfit. 2. You may eat, drink and take your normal medicines. 3. For any questions that cannot be answered, please contact the ordering physician            Oct 04, 2017  7:00 AM CDT   (Arrive by 6:45 AM)   Return Visit with Edu Benitez MD   [facility] ([facility])    [address]  [address] [facility] [id number] [phone]            Nov 02, 2017  5:00 PM CDT   US HEAD NECK SOFT TISSUE with [address] [place] [address] Imaging Center US ([facility])    [address] [place] [address]  [address]st [facility] [id number] [phone]           Please bring a list of your medicines (including vitamins, minerals and over-the-counter drugs). Also, tell your doctor about any allergies you may have. Wear comfortable clothes and leave your valuables at home.  You do not need to do anything special to prepare for your exam.  Please call the Imaging Department at your exam site with any questions.            Nov 02, 2017  6:00 PM CDT   LAB  with UC LAB   [facility] Lab ([facility])    [address] [place] [id number] [phone]           Patient must bring picture ID. Patient should be prepared to give a urine specimen  Please do not eat 10-12 hours before your appointment if you are coming in fasting for labs on lipids, cholesterol, or glucose (sugar). Pregnant women should follow their Care Team instructions. Water with medications is okay. Do not drink coffee or other fluids. If you have concerns about taking  your medications, please ask at office or if scheduling via Jalbum, send a message by clicking on Secure Messaging, Message Your Care Team.            Nov 13, 2017  4:30 PM CST   (Arrive by 4:15 PM)   RETURN ENDOCRINE with Avelina Castro MD   [facility] Endocrinology ([facility])    [address] [place] [id number] [phone]            Nov 27, 2017  4:00 PM CST   (Arrive by 3:45 PM)   Return Visit with Shahla Crawley MD   [facility] ([facility])    [address] [place] [id number] [phone]              Future tests that were ordered for you today     Open Future Orders        Priority Expected Expires Ordered    [facility]-Operative Worksheet Routine  9/26/2018 9/26/2017            Who to contact     If you have questions or need follow up information about today's clinic visit or your schedule please contact [facility] directly at [phone].  Normal or non-critical lab and imaging results will be communicated to you by Stribehart, letter or phone within 4 business days after the clinic has received the results. If you do not hear from us within 7 days, please contact the clinic through Stribehart or phone. If you have a critical or abnormal lab result, we will notify you by phone as soon as possible.  Submit refill requests through [employer] or  call your pharmacy and they will forward the refill request to us. Please allow 3 business days for your refill to be completed.          Additional Information About Your Visit        [employer] Information     [employer] gives you secure access to your electronic health record. If you see a primary care provider, you can also send messages to your care team and make appointments. If you have questions, please call your primary care clinic.  If you do not have a primary care provider, please call [phone] and they will assist you.        Care EveryWhere ID     This is your Care EveryWhere ID. This could be used by other organizations to access your [facility] medical records  [id number]         Blood Pressure from Last 3 Encounters:   09/25/17 132/90   09/15/17 107/70   09/15/17 142/86    Weight from Last 3 Encounters:   09/25/17 87.6 kg (193 lb 3.2 oz)   09/15/17 87.9 kg (193 lb 12.6 oz)   09/14/17 87.9 kg (193 lb 12.6 oz)              Today, you had the following     No orders found for display         Today's Medication Changes          These changes are accurate as of: 9/27/17  9:21 AM.  If you have any questions, ask your nurse or doctor.               Start taking these medicines.        Dose/Directions    lidocaine-prilocaine cream   Commonly known as:  EMLA   Used for:  Neoplasm related pain, Chest wall pain   Started by:  Edu Benitez MD        Apply topically as needed (port access pain.)   Quantity:  30 g   Refills:  1         These medicines have changed or have updated prescriptions.        Dose/Directions    morphine 30 MG 12 hr tablet   Commonly known as:  MS CONTIN   This may have changed:    - additional instructions  - Another medication with the same name was removed. Continue taking this medication, and follow the directions you see here.   Used for:  Neoplasm related pain   Changed by:  Edu Benitez MD        Dose:  30 mg   Take 1 tablet (30 mg) by mouth every 8 hours . Take  an addition pill at night such that your evening dose is 60mg   Quantity:  120 tablet   Refills:  0            Where to get your medicines      These medications were sent to [facility] [place], [place] - [address]  [address], [facility] [address]    Hours:  TRANSPLANT PHONE NUMBER [phone] Phone:  [phone]     lidocaine-prilocaine cream         Some of these will need a paper prescription and others can be bought over the counter.  Ask your nurse if you have questions.     Bring a paper prescription for each of these medications     COMPOUNDED NON-CONTROLLED SUBSTANCE - PHARMACY TO MIX COMPOUNDED MEDICATION    morphine 30 MG 12 hr tablet    oxyCODONE 10 MG IR tablet                Primary Care Provider Office Phone # Fax #    Shahla Crawley [phone]       [address] [place] [address]  [facility] [address]        Equal Access to Services     RODRIGO ZAMORA : Hadii sacha marley hadasho Soomaali, waaxda luqadaha, qaybta kaalmada adeegyada, waxay caridadin haydaljitn chanell yoder . So [facility] [phone].    ATENCIÓN: Si habla español, tiene a stiles disposición servicios gratuitos de asistencia lingüística. Oneil al [phone].    We comply with applicable federal civil rights laws and [place] laws. We do not discriminate on the basis of race, color, national origin, age, disability sex, sexual orientation or gender identity.            Thank you!     Thank you for choosing [facility]  for your care. Our goal is always to provide you with excellent care. Hearing back from our patients is one way we can continue to improve our services. Please take a few minutes to complete the written survey that you may receive in the mail after your visit with us. Thank you!             Your Updated Medication List - Protect others around you: Learn how to safely use, store and throw away your medicines at www.disposemymeds.org.           This list is accurate as of: 9/27/17  9:21 AM.  Always use your most recent med list.                   Brand Name Dispense Instructions for use Diagnosis    ACAI PO      Take 1,000 mg by mouth 2 times daily    Breast cancer, unspecified laterality, Thyroid cancer (H), Chronic arthralgias of knees and hips, unspecified laterality       aluminum chloride 20 % external solution    DRYSOL    60 mL    Apply topically At Bedtime    Rash, Intertrigo       AZMACORT IN      Inhale 2 puffs into the lungs as needed        BENADRYL PO      Take 50 mg by mouth as needed        BETAINE HCL PO      Take 2 tablets by mouth as needed Betaine HCL and Pepsin: Take 1-7 tabs by mouth daily, if burning take one tablet less once daily. Betaine HCL 1.04 g Pepsin 40 mg        calcitRIOL 0.25 MCG capsule    ROCALTROL    270 capsule    2 tabs in am and 1 tab qhs    Postsurgical hypothyroidism, Papillary carcinoma, follicular variant (H), Metastasis to cervical lymph node (H)       CALCIUM CITRATE +D 315-250 MG-UNIT Tabs per tablet   Generic drug:  calcium citrate-vitamin D     120 tablet    Take 2 tablets by mouth 3 times daily        celecoxib 200 MG capsule    celeBREX    180 capsule    Take 1 capsule (200 mg) by mouth 2 times daily    Chest wall pain       chlorhexidine 0.12 % solution    PERIDEX          * Colchicine 0.6 MG Caps     270 capsule    Take 0.6 mg by mouth 3 times daily for costochondritis (chest) discomfort. Scale back use to prn with loose stools or bloating.    Costal chondritis       * COLCRYS 0.6 MG tablet   Generic drug:  colchicine      Take 1.6 mg by mouth 3 times daily        COMPOUNDED NON-CONTROLLED SUBSTANCE - PHARMACY TO MIX COMPOUNDED MEDICATION    CMPD RX    120 g    Apply small amount to affected area two times daily.Ketamine 6% + ketoprofen 10% + lidocaine 10% in Lipo.    Neoplasm related pain       cromolyn 4 % ophthalmic solution    OPTICROM    10 mL    Place 1 drop into both eyes 4 times daily     Idiopathic mast cell activation syndrome (H)       cyclobenzaprine 10 MG tablet    FLEXERIL     Take 10 mg by mouth 3 times daily as needed On hold Dr Monsalve        dexamethasone 4 MG tablet    DECADRON    10 tablet    Take 1 tablet (4 mg) by mouth 2 times daily (with meals)    Spine metastasis (H), Hilar adenopathy       diazepam 5 MG tablet    VALIUM    90 tablet    Take 1 tablet (5 mg) by mouth 3 times daily as needed For muscle spasms    Chest wall pain       diclofenac 1 % Gel topical gel    VOLTAREN    100 g    Apply affected area two times daily PRN using enclosed dosing card.    Myofascial pain       docusate sodium 100 MG tablet    COLACE    60 tablet    Take 100 mg by mouth daily    Acute constipation       EPINEPHrine 0.3 MG/0.3ML injection 2-pack    EPIPEN 2-CARIDAD    0.6 mL    Inject 0.3 mLs (0.3 mg) into the muscle once as needed for anaphylaxis        hydrochlorothiazide 12.5 MG capsule    MICROZIDE    90 capsule    Take 1 capsule (12.5 mg) by mouth daily    Hypocalcemia       levothyroxine 112 MCG tablet    SYNTHROID/LEVOTHROID    189 tablet    Mon-Sat: (2 tabs daily) Sunday: 3 tabs    Postsurgical hypothyroidism, Papillary carcinoma, follicular variant (H), Postsurgical hypoparathyroidism (H), Thyroid cancer (H)       lidocaine 5 % ointment    XYLOCAINE    35.44 g    SANJANA TOPICALLY QID PRN    Chest wall pain       Lidocaine HCl Monohydrate Powd           lidocaine-prilocaine cream    EMLA    30 g    Apply topically as needed (port access pain.)    Neoplasm related pain, Chest wall pain       MAGNESIUM CITRATE PO      Take 400 mg by mouth daily        methocarbamol 750 MG tablet    ROBAXIN    360 tablet    Take 1 tablet (750 mg) by mouth 4 times daily as needed . Ok to take a 5th Robaxin on very severe days.    Myofascial pain       mometasone 110 MCG/INH inhaler    ASMANEX 30 METERED DOSES    3 Inhaler    Inhale 1 puff into the lungs daily    Intermittent asthma, uncomplicated       montelukast 10 MG  tablet    SINGULAIR    120 tablet    TAKE TWO TABLETS BY MOUTH TWICE DAILY    Idiopathic mast cell activation syndrome (H)       morphine 30 MG 12 hr tablet    MS CONTIN    120 tablet    Take 1 tablet (30 mg) by mouth every 8 hours . Take an addition pill at night such that your evening dose is 60mg    Neoplasm related pain       NASALCROM 5.2 MG/ACT Aers Inhaler   Generic drug:  cromolyn sodium     1 Bottle    SPRAY ONE SPRAY( 1 ML) IN NOSTRIL DAILY    Mast cell disease, systemic (H)       OMEGA 3 PO      Take 5 mLs by mouth        ondansetron 4 MG ODT tab    ZOFRAN ODT    60 tablet    Take 1-2 tablets (4-8 mg) by mouth every 8 hours as needed for nausea or vomiting    Nausea with vomiting       * order for DME     2 Units    Equipment being ordered: compression stockings. 20-30 mm or 30 - 40 mm as patient can tolerate. Physical therapy to determine if they should be above or below the knee.    Venous stasis       * order for DME     2 Device    Equipment being ordered: compression bra    Malignant neoplasm of right female breast, unspecified site of breast       oxyCODONE 10 MG IR tablet    ROXICODONE    180 tablet    Take 1.5-2 tablets (15-20 mg) by mouth every 4 hours as needed    Neoplasm related pain       polyethylene glycol powder    MIRALAX    510 g    Take 17 g by mouth daily    Acute constipation       potassium chloride SA 20 MEQ CR tablet    KLOR-CON    90 tablet    Take 1 tablet (20 mEq) by mouth daily    Hypokalemia       PROBIOTIC DAILY PO      Take 1 capsule by mouth daily Lacto acid bifidobacterium    Breast cancer, unspecified laterality, Thyroid cancer (H), Chronic arthralgias of knees and hips, unspecified laterality       ranitidine 75 MG tablet    ZANTAC    270 tablet    Take 1 tablet (75 mg) by mouth 3 times daily    Mast cell disease, systemic (H)       SUMAtriptan 50 MG tablet    IMITREX    5 tablet    Take 1 tablet (50 mg) by mouth at onset of headache for migraine (may repeat in 2 hours  as needed, max 2 tablets daily)    Migraine without status migrainosus, not intractable, unspecified migraine type       tacrolimus 0.1 % ointment    PROTOPIC    60 g    Apply topically 2 times daily    Rash, Intertrigo       tamoxifen 20 MG tablet    NOLVADEX    90 tablet    Take 1 tablet (20 mg) by mouth daily    Malignant neoplasm of female breast, unspecified laterality, unspecified site of breast       triamcinolone 0.025 % ointment    KENALOG    80 g    Apply topically 2 times daily Mix with 1 lb jar of vaseline or aquaphor    Intertrigo, Rash       TUMS 500 MG chewable tablet   Generic drug:  calcium carbonate     180 chew tab    Take 2 tablets (1,000 mg) by mouth nightly as needed for other (cramps)        * UNABLE TO FIND      1 tablet 3 times daily MEDICATION NAME: calcium D-Glucarate        * UNABLE TO FIND      2 tablets 3 times daily MEDICATION NAME: Natural D-Hist    Breast cancer, unspecified laterality, Papillary carcinoma, follicular variant (H), Chronic musculoskeletal pain       * UNABLE TO FIND      MEDICATION NAME: Tumeric 3 capsules daily        * UNABLE TO FIND      1 tablet 3 times daily MEDICATION NAME: Digestzymes    Thyroid cancer (H), Postsurgical hypothyroidism, Postsurgical hypoparathyroidism (H)       * UNABLE TO FIND      1 tablet daily MEDICATION NAME: Pure Encapsulations    Thyroid cancer (H), Postsurgical hypothyroidism, Postsurgical hypoparathyroidism (H)       VENTOLIN  (90 BASE) MCG/ACT Inhaler   Generic drug:  albuterol     18 g    INHALE 2 PUFFS INTO THE LUNGS EVERY 4 HOURS AS NEEDED FOR SHORTNESS OF BREATH OR DIFFICULT BREATHING OR WHEEZING    Mild intermittent asthma without complication       vitamin D3 2000 UNITS Caps     60 capsule    Take 10,000 Units by mouth daily    Thyroid cancer (H), Postsurgical hypothyroidism, Papillary carcinoma, follicular variant (H), Postsurgical hypoparathyroidism (H)       ZYRTEC ALLERGY 10 MG tablet   Generic drug:  cetirizine     90  tablet    Take 1 tablet (10 mg) by mouth 3 times daily On hold for lab test.        * Notice:  This list has 9 medication(s) that are the same as other medications prescribed for you. Read the directions carefully, and ask your doctor or other care provider to review them with you.

## 2017-09-28 ENCOUNTER — CARE COORDINATION (OUTPATIENT)
Dept: ONCOLOGY | Facility: CLINIC | Age: 57
End: 2017-09-28

## 2017-09-28 DIAGNOSIS — C85.10 HIGH GRADE B-CELL LYMPHOMA (H): Primary | ICD-10-CM

## 2017-09-28 LAB
ALBUMIN SERPL ELPH-MCNC: 4 G/DL (ref 3.7–5.1)
ALPHA1 GLOB SERPL ELPH-MCNC: 0.3 G/DL (ref 0.2–0.4)
ALPHA2 GLOB SERPL ELPH-MCNC: 0.7 G/DL (ref 0.5–0.9)
B-GLOBULIN SERPL ELPH-MCNC: 0.9 G/DL (ref 0.6–1)
GAMMA GLOB SERPL ELPH-MCNC: 0.8 G/DL (ref 0.7–1.6)
M PROTEIN SERPL ELPH-MCNC: 0 G/DL
PROT PATTERN SERPL ELPH-IMP: NORMAL

## 2017-09-28 RX ORDER — DEXAMETHASONE 4 MG/1
4 TABLET ORAL 2 TIMES DAILY WITH MEALS
Qty: 60 TABLET | Refills: 0 | Status: SHIPPED | OUTPATIENT
Start: 2017-09-28 | End: 2017-09-28

## 2017-09-28 RX ORDER — DEXAMETHASONE 4 MG/1
4 TABLET ORAL 2 TIMES DAILY WITH MEALS
Qty: 60 TABLET | Refills: 0 | Status: ON HOLD | OUTPATIENT
Start: 2017-09-28 | End: 2017-10-11

## 2017-09-28 NOTE — PROGRESS NOTES
Reason for Outgoing Call:   [facility] reply recd from pt today: (not viewable in general chart view)  Pal Barrett, received the information however I have a couple of questions and clinical symptoms. Who am I meeting on Monday 10-2-17 at 710am in Oncology. They took 10 tubes of blood yesterday -did they miss labs? I see the bone biopsy is at 11am on 10-3-2017 Tuesday. Who is Amelia Watkins PA-C and when am I suppose to be meeting this person on 10/3. Port placement is the next day 10-4-2017 at 945am. (You have me there every day of the week-thought we were going to put these appointments together.) What at about the Dexamethasone - I thought I heard I was staying on that but don't have a RX for it - it does help.   Clinical symptoms - I am having constant chest pressure/pain in the center of the chest entire sternum painful, can't take a deep breath, it goes straight through to the back and into the abdomen with pain/pressure. Squeezing in my meat hook area's( I am not coming into ER unless it really really bad to the point is scarring me)   Would like to have the chemo start on Thursday evening if possible and go through the weekend so I don't miss a lot of work time (like this next week) Elvin my cell phone is working now   Nursing Action/Patient Instruction:  Reviewed med list  -phone call to pt re: sx above: pt endorses same sx now. Scheduling questions answered earlier by our scheduling team  - discussed sx with Dr. Sohail DUQUE:  Garima Sauceda MD / Laila Barrett, RN:  Continue dex 4 mg bid - 30 day supply sent to pt's preferred pharmacy  If sx worsen advise eval in ED  - Notified pt of above recs from Dr. Sauceda  Patient Response/Evaluation:   Pt voiced understanding of above instructions and information and denied further questions

## 2017-09-29 ENCOUNTER — MYC MEDICAL ADVICE (OUTPATIENT)
Dept: ONCOLOGY | Facility: CLINIC | Age: 57
End: 2017-09-29

## 2017-09-29 DIAGNOSIS — C85.10 HIGH GRADE B-CELL LYMPHOMA (H): Primary | ICD-10-CM

## 2017-09-29 DIAGNOSIS — Z00.6 EXAMINATION OF PARTICIPANT IN CLINICAL TRIAL: Primary | ICD-10-CM

## 2017-09-29 LAB
EBV DNA # SPEC NAA+PROBE: NORMAL {COPIES}/ML
EBV DNA SPEC NAA+PROBE-LOG#: NORMAL {LOG_COPIES}/ML

## 2017-09-29 NOTE — PROGRESS NOTES
HEMATOLOGIC CLINIC NOTE      HISTORY OF PRESENT ILLNESS:    Mrs. Archer is a 57-year-old patient with a diagnosis of breast cancer who has been care for by Dr. Shahla Crawley.  The patient was recently diagnosed with non-Hodgkin's lymphoma and is transferring her care to me.      The patient is a pleasant female who had an early-stage breast cancer diagnosed 2011, she underwent bilateral mastectomy with reconstruction and has been on tamoxifen. She did not have chemotherapy or radiation. She has suffered chronically from a pain syndrome involving the chest wall and back and has [facility] with Dr. Benitez.  In august 2017, she presented to ER with dramatic worsening chest pain and back pain. Her workup was negative for cardiac and pulmonary issues.  However, the CT scan performed on 09/01/2017 demonstrated a new destructive lesion in the medial right tenth rib extending to the right T9-T10 neural foramen with vertebral body invasion.  There was associated conglomerate of lymphadenopathy around the mid T-spine. On 09/15 she underwent CT-guided biopsy of a T10 paraspinal mass on the right. The pathology report of the T8 vertebral body invading mass showed B-cell high-grade lymphoma.  The morphology shows a population of large cells, diffuse lymphoid infiltrate.  The cells are atypical with abundant mitotic and apoptotic activity and single cell necrosis.  Tumor is CD45 and CD79a positive and focally weakly CD30 positive.  The other stains revealed positivity for CD20, BCL6, BCL2 and MUM1, and CD10 and CD21 negative.  The CD5 and CD3 highlighted background T-cells.  MYC expression was equivocal with approximately 40% nuclei staining.  Ki-67 proliferative index is greater than 90-95%.  The immunohistochemistry is suggestive of non-GCB immunophenotype of diffuse large B-cell lymphoma.  The cytogenetics did not show rearrangement of the BCL2 or c-MYC.  There is the presence of BCL6 rearrangement in  78% of cells and gains of MYC and BCL2, but no amplifications.       The patient has healed well from the biopsy.  She is continuing to work full-time.  She has ongoing chest wall pain and back pain and some improvement with dexamethasone 4 mg twice a day, which she took for the last week.       REVIEW OF SYSTEMS:  The review of systems revealed she is not well, tired, but no night sweats or fevers.  She had lost about 7 pounds but gained them back recently.  She had no visual changes or headaches.  She had no shortness of breath, dyspnea, orthopnea or palpitations.  She had chronic chest pain syndrome, but no weakness in her arms or legs.  She denies numbness or tingling in her extremities, either. The back-pain in throbbing at times.       PAST MEDICAL HISTORY:  T1c N0 M0 infiltrating ductal carcinoma of the right breast with a strong family history of breast cancer, BRCA1 and BRCA2 negative.  This was in 12/2011.  She is status post bilateral mastectomy with immediate reconstruction in 01/2012.  ER and HI were positive and HER2 was negative.  Other medical history includes papillary thyroid cancer, follicular variant, stage III, ANSELMO recurrence risk intermediate, treated with total thyroidectomy and CND and RRA.  She is currently treated postsurgically hypothyroid, on LP4 and levothyroxine.  She has hypocalcemia with a normal PTH possibly related to calcium malabsorption.  She has history of a past Rachael-en-Y.  She has a history of malabsorption of calcium, vitamin D and L-thyroxine.  The thyroid cancer was discovered in 2013.  The patient had a total thyroidectomy.  She had C-sections and a hysterectomy.  She has costochondritis, a history of gestational diabetes mellitus, and a history of gestational hypertension.  She has a history of lymphedema in both arms and chest.  She has a history of migraine headaches.  She has a history of neuropathy associated with the cancer.  She has a history of kidney stones.  She  has a history of allergic  rhinitis.      MEDICATIONS:   2.  Imitrex.    3.  Zofran.    4.  MS Contin 30 mg twice a day with oxycodone added as needed.    5.  Valium 5 mg 3 times a day.   6.  Robaxin p.r.n.    7.  Cromolyn   8.  Tamoxifen 20 mg a day.   9.  Singulair.   10.  Hydrochlorothiazide 12.5 mg capsule once a day.   11.  Levothyroxine 112 mcg a day.    12.  Digestive enzymes.    13.  Azmacort.    14.  MiraLax.    15.  Probiotics.       ALLERGIES:  She has multiple allergies or intolerances including amitriptyline, baclofen, birch trees, blue dye, Cymbalta, gabapentin causing edema, grass, nortriptyline, Topamax, and sertraline which causes anxiety and swelling.       FAMILY HISTORY:  Negative for blood cancer or lymphoma.  There is a strong family history of breast cancer.       PHYSICAL EXAMINATION:   GENERAL:  The patient is a middle-aged woman who appears her stated age.  She is oriented and appears anxious.    VITAL SIGNS:  Stable with a blood pressure of 132/90, pulse of 80, respirations of 16, and temperature of 98.2.   HEENT:  She has normal hair growth.  Anicteric sclerae.  Oropharynx is clear.  Good dentition.  Tongue is midline and non-coated.  No mucosal lesions or buccal lesions.    NECK:  Supple.  I do not feel any enlarged lymphadenopathy in the neck area.  No supraclavicular or infraclavicular lymph nodes.  No axillary lymph nodes.   CHEST:  Clear to auscultation bilaterally.   HEART:  Heart tones are regular, S1, S2.  No gallops, murmurs or rubs.   ABDOMEN:  Soft and nontender.  There is a scar post-hysterectomy.     CHEST WALL:  The chest wall is tender to palpation.  There is no bruising or ecchymosis.   EXTREMITIES:  Without swelling.  No inguinal lymph nodes.   NEURO exam without focal deficit.      LABORATORY:  I reviewed the laboratory results which showed mild anemia of 11.3 g/dL, white cell count is normal, and platelet count is 232,000.  She has normal electrolytes.  Her creatinine  is also at a normal level of 0.83 and liver function tests are all acceptable.        PATHOLOGY:  I reviewed her pathology report which was performed at [facility].       IMAGING:  PET scan was obtained on 09/06/2017.  This is concluded as a new 3.2-cm, hypermetabolic, soft tissue mass involving the right tenth rib with extension into the neural foramen and minimal bony erosion to the T10 adjacent body.  There are 3 new, enlarged, FDG-avid, mediastinal and hilar lymph nodes and 1-mm non-obstructive nephrolithiasis.  There is no disease present in the neck.  The mass measures 3.2 x 3.4 x 2.4 cm.      ASSESSMENT AND PLAN:  To summarize, Mrs. Archer is a 57-year-old female with a complex medical history, now presenting with a newly diagnosed, high-grade B-cell lymphoma, non-GCB with no evidence of    c-MYC or BCL6 rearrangement, but with overexpression of c-MYC by immunohistochemistry and mitotic index over 95%.  The patient had disease localized above the diaphragm in thoracic and paravertebral soft tissue and mediastinal lymphadenopathy.      Today I spent 60 minutes in consultation reviewing the diagnosis of non-Hodgkin lymphoma with aggressive features and treatment options.  I recommended to proceed with staging including a bone marrow biopsy and spinal tap to assess the CSF involvement.  The patient will also undergo an echocardiogram and EKG today in anticipation of anthracycline chemotherapy.             The treatment includes systemic chemotherapy; currently no indication for radiation therapy. My recommendation is to proceed with dose-adjusted R-EPOCH with rituximab with each cycle, and intrathecal chemotherapy given bone and paraspinal axial skeleton disease. The patient has about 50% probability to be cured with chemotherapy.  We discussed the chemotherapeutic agents including the expected toxicities and side effects such as low counts, mucositis and fatigue.      I recommend she follow up with the Palliative  Care team and the Pain team to continue to control her pain.  In the meantime, she will continue dexamethasone 4 mg b.i.d. for her back pain, as this can have antiedematous property.  The patient is a good candidate for chemotherapy. I am going to see her back with the results of the staging studies and we will start the chemotherapy within the next 7-10 days.  She will follow up with the endocrinologist as well.  The patient is a good candidate for the cardioprotective study and will be receiving an MRI of her myocardium this coming week.       Tisha and her  asked very good questions, and I hope she feels better soon. Thank you for referring this pleasant lady to me.       Garima Sauceda MD  Associate Professor of Medicine  [phone]

## 2017-09-29 NOTE — TELEPHONE ENCOUNTER
"Placed call to Elvin to discuss her concerns around her appointment times.  Discussed that she needs to have her labs drawn early enough to have the results back prior any procedure.   She states \"you people are going to get me fired, I will come at 9:30 and if they are ready for me when I walk in the labs will be back\" Discussed that writer cannot guarantee that she can be brought back immediately upon entering the clinic, she was angry with this answer and demanded that she \"don;t need the labs, they took 11 tubes last time I was there\". Writer explained that she will need to have labs as ordered and if the time did not work for her she could have them drawn the day before when she is at the clinic. Provided her with the scheduling number in order for her to make arrangements that work with her schedule.   She was unhappy that she would not be able to reach her care coordinator later today and wanted to be able to contact writer directly, provided the triage number which she was unhappy with stating, \"of course you give me triawge and not yourself\". Then ended the call.  "

## 2017-10-01 LAB — INTERPRETATION ECG - MUSE: NORMAL

## 2017-10-02 ENCOUNTER — APPOINTMENT (OUTPATIENT)
Dept: LAB | Facility: CLINIC | Age: 57
End: 2017-10-02
Attending: INTERNAL MEDICINE
Payer: COMMERCIAL

## 2017-10-02 ENCOUNTER — MYC MEDICAL ADVICE (OUTPATIENT)
Dept: ONCOLOGY | Facility: CLINIC | Age: 57
End: 2017-10-02

## 2017-10-02 ENCOUNTER — ANESTHESIA EVENT (OUTPATIENT)
Dept: SURGERY | Facility: AMBULATORY SURGERY CENTER | Age: 57
End: 2017-10-02

## 2017-10-02 DIAGNOSIS — E89.2 POSTSURGICAL HYPOPARATHYROIDISM (H): ICD-10-CM

## 2017-10-02 DIAGNOSIS — C83.30 DLBCL (DIFFUSE LARGE B CELL LYMPHOMA) (H): ICD-10-CM

## 2017-10-02 LAB
HBV SURFACE AB SERPL IA-ACNC: 1.37 M[IU]/ML
HBV SURFACE AG SERPL QL IA: NONREACTIVE
HCV AB SERPL QL IA: NONREACTIVE
IGG SERPL-MCNC: 704 MG/DL (ref 695–1620)
POTASSIUM SERPL-SCNC: 3.6 MMOL/L (ref 3.4–5.3)
URATE SERPL-MCNC: 5.4 MG/DL (ref 2.6–6)

## 2017-10-02 RX ORDER — SODIUM CHLORIDE, SODIUM LACTATE, POTASSIUM CHLORIDE, CALCIUM CHLORIDE 600; 310; 30; 20 MG/100ML; MG/100ML; MG/100ML; MG/100ML
INJECTION, SOLUTION INTRAVENOUS CONTINUOUS
Status: CANCELLED | OUTPATIENT
Start: 2017-10-02

## 2017-10-02 RX ORDER — ONDANSETRON 2 MG/ML
4 INJECTION INTRAMUSCULAR; INTRAVENOUS EVERY 30 MIN PRN
Status: CANCELLED | OUTPATIENT
Start: 2017-10-02

## 2017-10-02 RX ORDER — ONDANSETRON 4 MG/1
4 TABLET, ORALLY DISINTEGRATING ORAL EVERY 30 MIN PRN
Status: CANCELLED | OUTPATIENT
Start: 2017-10-02

## 2017-10-02 RX ORDER — MEPERIDINE HYDROCHLORIDE 25 MG/ML
12.5 INJECTION INTRAMUSCULAR; INTRAVENOUS; SUBCUTANEOUS
Status: CANCELLED | OUTPATIENT
Start: 2017-10-02

## 2017-10-02 RX ORDER — NALOXONE HYDROCHLORIDE 0.4 MG/ML
.1-.4 INJECTION, SOLUTION INTRAMUSCULAR; INTRAVENOUS; SUBCUTANEOUS
Status: CANCELLED | OUTPATIENT
Start: 2017-10-02 | End: 2017-10-03

## 2017-10-03 ENCOUNTER — OFFICE VISIT (OUTPATIENT)
Dept: TRANSPLANT | Facility: CLINIC | Age: 57
End: 2017-10-03
Attending: PHYSICIAN ASSISTANT
Payer: COMMERCIAL

## 2017-10-03 ENCOUNTER — ANESTHESIA EVENT (OUTPATIENT)
Dept: SURGERY | Facility: AMBULATORY SURGERY CENTER | Age: 57
End: 2017-10-03

## 2017-10-03 ENCOUNTER — ANESTHESIA (OUTPATIENT)
Dept: SURGERY | Facility: AMBULATORY SURGERY CENTER | Age: 57
End: 2017-10-03

## 2017-10-03 ENCOUNTER — HOSPITAL ENCOUNTER (OUTPATIENT)
Facility: AMBULATORY SURGERY CENTER | Age: 57
End: 2017-10-03
Attending: PHYSICIAN ASSISTANT

## 2017-10-03 ENCOUNTER — SURGERY (OUTPATIENT)
Age: 57
End: 2017-10-03

## 2017-10-03 VITALS
WEIGHT: 193.3 LBS | SYSTOLIC BLOOD PRESSURE: 125 MMHG | DIASTOLIC BLOOD PRESSURE: 72 MMHG | TEMPERATURE: 98 F | RESPIRATION RATE: 16 BRPM | HEIGHT: 65 IN | OXYGEN SATURATION: 97 % | BODY MASS INDEX: 32.21 KG/M2

## 2017-10-03 DIAGNOSIS — C85.16 B-CELL LYMPHOMA OF INTRAPELVIC LYMPH NODES, UNSPECIFIED B-CELL LYMPHOMA TYPE (H): ICD-10-CM

## 2017-10-03 DIAGNOSIS — C85.10 HIGH GRADE B-CELL LYMPHOMA (H): Primary | ICD-10-CM

## 2017-10-03 LAB — COPATH REPORT: NORMAL

## 2017-10-03 PROCEDURE — 40000951 ZZHCL STATISTIC BONE MARROW INTERP TC 85097: Performed by: INTERNAL MEDICINE

## 2017-10-03 PROCEDURE — G0364 BONE MARROW ASPIRATE &BIOPSY: HCPCS

## 2017-10-03 PROCEDURE — 88185 FLOWCYTOMETRY/TC ADD-ON: CPT | Performed by: INTERNAL MEDICINE

## 2017-10-03 PROCEDURE — 00000058 ZZHCL STATISTIC BONE MARROW ASP PERF TC 38220: Performed by: INTERNAL MEDICINE

## 2017-10-03 PROCEDURE — 40000424 ZZHCL STATISTIC BONE MARROW CORE PERF TC 38221: Performed by: INTERNAL MEDICINE

## 2017-10-03 PROCEDURE — 88313 SPECIAL STAINS GROUP 2: CPT | Performed by: INTERNAL MEDICINE

## 2017-10-03 PROCEDURE — 40000611 ZZHCL STATISTIC MORPHOLOGY W/INTERP HEMEPATH TC 85060: Performed by: INTERNAL MEDICINE

## 2017-10-03 PROCEDURE — 88161 CYTOPATH SMEAR OTHER SOURCE: CPT | Performed by: INTERNAL MEDICINE

## 2017-10-03 PROCEDURE — 88184 FLOWCYTOMETRY/ TC 1 MARKER: CPT | Performed by: INTERNAL MEDICINE

## 2017-10-03 PROCEDURE — 88311 DECALCIFY TISSUE: CPT | Performed by: INTERNAL MEDICINE

## 2017-10-03 PROCEDURE — 40000795 ZZHCL STATISTIC DNA PROCESS AND HOLD: Performed by: INTERNAL MEDICINE

## 2017-10-03 PROCEDURE — 88280 CHROMOSOME KARYOTYPE STUDY: CPT | Performed by: INTERNAL MEDICINE

## 2017-10-03 PROCEDURE — 40001004 ZZHCL STATISTIC FLOW INT 9-15 ABY TC 88188: Performed by: INTERNAL MEDICINE

## 2017-10-03 PROCEDURE — 00000161 ZZHCL STATISTIC H-SPHEME PROCESS B/S: Performed by: INTERNAL MEDICINE

## 2017-10-03 PROCEDURE — 38221 DX BONE MARROW BIOPSIES: CPT

## 2017-10-03 PROCEDURE — 88305 TISSUE EXAM BY PATHOLOGIST: CPT | Performed by: INTERNAL MEDICINE

## 2017-10-03 PROCEDURE — 88237 TISSUE CULTURE BONE MARROW: CPT | Performed by: INTERNAL MEDICINE

## 2017-10-03 PROCEDURE — 88264 CHROMOSOME ANALYSIS 20-25: CPT | Performed by: INTERNAL MEDICINE

## 2017-10-03 PROCEDURE — 88275 CYTOGENETICS 100-300: CPT | Performed by: INTERNAL MEDICINE

## 2017-10-03 PROCEDURE — 88271 CYTOGENETICS DNA PROBE: CPT | Performed by: INTERNAL MEDICINE

## 2017-10-03 RX ORDER — FENTANYL CITRATE 50 UG/ML
25-50 INJECTION, SOLUTION INTRAMUSCULAR; INTRAVENOUS
Status: DISCONTINUED | OUTPATIENT
Start: 2017-10-03 | End: 2017-10-04 | Stop reason: HOSPADM

## 2017-10-03 RX ORDER — NALOXONE HYDROCHLORIDE 0.4 MG/ML
.1-.4 INJECTION, SOLUTION INTRAMUSCULAR; INTRAVENOUS; SUBCUTANEOUS
Status: DISCONTINUED | OUTPATIENT
Start: 2017-10-03 | End: 2017-10-04 | Stop reason: HOSPADM

## 2017-10-03 RX ORDER — ONDANSETRON 2 MG/ML
INJECTION INTRAMUSCULAR; INTRAVENOUS PRN
Status: DISCONTINUED | OUTPATIENT
Start: 2017-10-03 | End: 2017-10-03

## 2017-10-03 RX ORDER — MEPERIDINE HYDROCHLORIDE 25 MG/ML
12.5 INJECTION INTRAMUSCULAR; INTRAVENOUS; SUBCUTANEOUS
Status: CANCELLED | OUTPATIENT
Start: 2017-10-03

## 2017-10-03 RX ORDER — KETOROLAC TROMETHAMINE 30 MG/ML
30 INJECTION, SOLUTION INTRAMUSCULAR; INTRAVENOUS EVERY 6 HOURS PRN
Status: CANCELLED | OUTPATIENT
Start: 2017-10-03 | End: 2017-10-08

## 2017-10-03 RX ORDER — SODIUM CHLORIDE, SODIUM LACTATE, POTASSIUM CHLORIDE, CALCIUM CHLORIDE 600; 310; 30; 20 MG/100ML; MG/100ML; MG/100ML; MG/100ML
INJECTION, SOLUTION INTRAVENOUS CONTINUOUS
Status: DISCONTINUED | OUTPATIENT
Start: 2017-10-03 | End: 2017-10-04 | Stop reason: HOSPADM

## 2017-10-03 RX ORDER — LIDOCAINE HYDROCHLORIDE 10 MG/ML
8-10 INJECTION, SOLUTION EPIDURAL; INFILTRATION; INTRACAUDAL; PERINEURAL
Status: DISCONTINUED | OUTPATIENT
Start: 2017-10-03 | End: 2017-10-04 | Stop reason: HOSPADM

## 2017-10-03 RX ORDER — LIDOCAINE HYDROCHLORIDE 20 MG/ML
INJECTION, SOLUTION INFILTRATION; PERINEURAL PRN
Status: DISCONTINUED | OUTPATIENT
Start: 2017-10-03 | End: 2017-10-03

## 2017-10-03 RX ORDER — NALOXONE HYDROCHLORIDE 0.4 MG/ML
.1-.4 INJECTION, SOLUTION INTRAMUSCULAR; INTRAVENOUS; SUBCUTANEOUS
Status: CANCELLED | OUTPATIENT
Start: 2017-10-03 | End: 2017-10-04

## 2017-10-03 RX ORDER — LIDOCAINE 40 MG/G
CREAM TOPICAL
Status: DISCONTINUED | OUTPATIENT
Start: 2017-10-03 | End: 2017-10-04 | Stop reason: HOSPADM

## 2017-10-03 RX ORDER — ONDANSETRON 2 MG/ML
4 INJECTION INTRAMUSCULAR; INTRAVENOUS EVERY 30 MIN PRN
Status: CANCELLED | OUTPATIENT
Start: 2017-10-03

## 2017-10-03 RX ORDER — MEPERIDINE HYDROCHLORIDE 25 MG/ML
12.5 INJECTION INTRAMUSCULAR; INTRAVENOUS; SUBCUTANEOUS
Status: DISCONTINUED | OUTPATIENT
Start: 2017-10-03 | End: 2017-10-04 | Stop reason: HOSPADM

## 2017-10-03 RX ORDER — FENTANYL CITRATE 50 UG/ML
25-50 INJECTION, SOLUTION INTRAMUSCULAR; INTRAVENOUS
Status: CANCELLED | OUTPATIENT
Start: 2017-10-03

## 2017-10-03 RX ORDER — SODIUM CHLORIDE, SODIUM LACTATE, POTASSIUM CHLORIDE, CALCIUM CHLORIDE 600; 310; 30; 20 MG/100ML; MG/100ML; MG/100ML; MG/100ML
INJECTION, SOLUTION INTRAVENOUS CONTINUOUS
Status: CANCELLED | OUTPATIENT
Start: 2017-10-03

## 2017-10-03 RX ORDER — PROPOFOL 10 MG/ML
INJECTION, EMULSION INTRAVENOUS PRN
Status: DISCONTINUED | OUTPATIENT
Start: 2017-10-03 | End: 2017-10-03

## 2017-10-03 RX ORDER — ACETAMINOPHEN 325 MG/1
975 TABLET ORAL ONCE
Status: CANCELLED | OUTPATIENT
Start: 2017-10-03 | End: 2017-10-03

## 2017-10-03 RX ORDER — ONDANSETRON 2 MG/ML
4 INJECTION INTRAMUSCULAR; INTRAVENOUS EVERY 30 MIN PRN
Status: DISCONTINUED | OUTPATIENT
Start: 2017-10-03 | End: 2017-10-04 | Stop reason: HOSPADM

## 2017-10-03 RX ORDER — ACETAMINOPHEN 325 MG/1
975 TABLET ORAL ONCE
Status: COMPLETED | OUTPATIENT
Start: 2017-10-03 | End: 2017-10-03

## 2017-10-03 RX ORDER — ONDANSETRON 4 MG/1
4 TABLET, ORALLY DISINTEGRATING ORAL EVERY 30 MIN PRN
Status: CANCELLED | OUTPATIENT
Start: 2017-10-03

## 2017-10-03 RX ORDER — KETOROLAC TROMETHAMINE 30 MG/ML
30 INJECTION, SOLUTION INTRAMUSCULAR; INTRAVENOUS EVERY 6 HOURS PRN
Status: DISCONTINUED | OUTPATIENT
Start: 2017-10-03 | End: 2017-10-04 | Stop reason: HOSPADM

## 2017-10-03 RX ORDER — ONDANSETRON 4 MG/1
4 TABLET, ORALLY DISINTEGRATING ORAL EVERY 30 MIN PRN
Status: DISCONTINUED | OUTPATIENT
Start: 2017-10-03 | End: 2017-10-04 | Stop reason: HOSPADM

## 2017-10-03 RX ADMIN — PROPOFOL 20 MG: 10 INJECTION, EMULSION INTRAVENOUS at 11:35

## 2017-10-03 RX ADMIN — PROPOFOL 40 MG: 10 INJECTION, EMULSION INTRAVENOUS at 11:25

## 2017-10-03 RX ADMIN — ACETAMINOPHEN 975 MG: 325 TABLET ORAL at 10:10

## 2017-10-03 RX ADMIN — PROPOFOL 20 MG: 10 INJECTION, EMULSION INTRAVENOUS at 11:27

## 2017-10-03 RX ADMIN — PROPOFOL 20 MG: 10 INJECTION, EMULSION INTRAVENOUS at 11:30

## 2017-10-03 RX ADMIN — ONDANSETRON 4 MG: 2 INJECTION INTRAMUSCULAR; INTRAVENOUS at 11:32

## 2017-10-03 RX ADMIN — LIDOCAINE HYDROCHLORIDE 60 MG: 20 INJECTION, SOLUTION INFILTRATION; PERINEURAL at 11:21

## 2017-10-03 RX ADMIN — SODIUM CHLORIDE, SODIUM LACTATE, POTASSIUM CHLORIDE, CALCIUM CHLORIDE: 600; 310; 30; 20 INJECTION, SOLUTION INTRAVENOUS at 10:10

## 2017-10-03 ASSESSMENT — LIFESTYLE VARIABLES: TOBACCO_USE: 0

## 2017-10-03 ASSESSMENT — COPD QUESTIONNAIRES: COPD: 0

## 2017-10-03 NOTE — PROGRESS NOTES
BMT ONC Adult Bone Marrow Biopsy Procedure Note  October 3, 2017  There were no vitals taken for this visit.     Learning needs assessment complete within 12 months? YES    DIAGNOSIS: B cell lymphoma     PROCEDURE: Unilateral Bone Marrow Biopsy    [place] [facility] [place]    Patient s identification was positively verified by verbal identification and invasive procedure safety checklist was completed. Informed consent was obtained. Following the administration of Propofol as pre-medication, patient was placed in the prone position and prepped and draped in a sterile manner. Approximately 20 cc of 1% Lidocaine was used over the left posterior iliac spine. Following this a 3 mm incision was made. Trephine bone marrow core(s) was (were) obtained from the [facility]. Bone marrow aspirates were obtained from the [facility]. Aspirates were sent for morphology, immunophenotyping, cytogenetics and molecular diagnostics (process and hold). A total of approximately 20 ml of marrow was aspirated. Following this procedure a sterile dressing was applied to the bone marrow biopsy site(s). The patient was placed in the supine position to maintain pressure on the biopsy site. Post-procedure wound care instructions were given.     Complications: NO    Interventions: NO    Post procedure pain: 0 out of 10    Length of procedure:20 minutes or less      Procedure performed by: Amelia Watkins

## 2017-10-03 NOTE — ANESTHESIA PREPROCEDURE EVALUATION
Tisha Arias is a 57 year old female with a PMH of  High Grade B Cell Lymph who is scheduled for Procedure(s):  Bone Marrow Biopsy    NPO Status: Adequate.  > 6 hours solids, > 2 hours clear liquids.       Past Surgical History:   Procedure Laterality Date     BACK SURGERY  ,    Bulging disc w nerve root impigment     BIOPSY BREAST      right     BIOPSY BREAST  11    right, core and sterotactic     BYPASS GASTRIC, CHOLECYSTECTOMY, COMBINED       C LAPAROSCOPIC GASTRIC RESTRICTIVE PX, W/GASTRIC BYPASS/ GAMALIEL-EN-Y, < 150CM      Gamaliel en Y?      SECTION       COLONOSCOPY      normal     COSMETIC SURGERY  2012    Final Stage of Mastectomy     DAVINCI HYSTERECTOMY TOTAL, BILATERAL SALPINGO-OOPHORECTOMY, COMBINED  2012    Procedure: COMBINED DAVINCI HYSTERECTOMY TOTAL, SALPINGO-OOPHORECTOMY;  Davinci Total Laparoscopic Hysterectomy, Bilateral Salpingo Oophorectomy, Pelvic Washings, Cystoscopy;  Surgeon: Evie Sheikh MD;  Location: [facility] OR     ENT SURGERY       ESOPHAGOSCOPY, GASTROSCOPY, DUODENOSCOPY (EGD), COMBINED N/A 2017    Procedure: COMBINED ENDOSCOPIC ULTRASOUND, ESOPHAGOSCOPY, GASTROSCOPY, DUODENOSCOPY (EGD), FINE NEEDLE ASPIRATE/BIOPSY;  Esophagogastroduodenoscopy, Endoscopic Ultrasound, with fine needle biopsy aspirate;  Surgeon: Patrick Robles MD;  Location: [facility] OR     GI SURGERY  2007    Gamaliel-en Y w cholecystectomy     GYN SURGERY  89,1/10/92    2 c-sections     HYSTERECTOMY, PAP NO LONGER INDICATED      DeVinci assister lap hyst with BSO     IRRIGATION AND DEBRIDEMENT BREAST  3/1/2012    Procedure:IRRIGATION AND DEBRIDEMENT BREAST; Irrigation and Debridement, Wound Closure Right Breast; Surgeon:JUNG GUILLEN; Location:[facility] OR     MASTECTOMY, RECONSTRUCT BREAST, COMBINED  2012    Procedure:COMBINED MASTECTOMY, RECONSTRUCT BREAST; Bilateral Mastectomies, Right Axillary [place] Node Biopsy, Bilateral Breast  Reconstruction with Tissue Expanders, Reconstruction of inframammary fold, bilateral pain management systems; Surgeon:ANUPAM CAMPBELL; Location:[facility] OR     RECONSTRUCT BREAST  8/31/2012    Procedure: RECONSTRUCT BREAST;  Bilateral 2nd stage breast reconstruction, revision,       SOFT TISSUE SURGERY  1-30-12    Mastectomy w severe myofascial pain syndrome     THYROIDECTOMY  7/10/2013    Procedure: THYROIDECTOMY;  Total Thyroidectomy with central neck dissection;  Surgeon: Indiana Sneed MD;  Location: [facility] OR     TONSILLECTOMY      childhood       Anesthesia Evaluation     . Pt has had prior anesthetic. Type: General    No history of anesthetic complications          ROS/MED HX    ENT/Pulmonary:     (+)Intermittent asthma , . .   (-) tobacco use and COPD   Neurologic:     (+)neuropathy    (-) CVA and TIA   Cardiovascular:        (-) hypertension, CAD, irregular heartbeat/palpitations and stent   METS/Exercise Tolerance:     Hematologic:        (-) anemia   Musculoskeletal:         GI/Hepatic:        (-) GERD and liver disease   Renal/Genitourinary:      (-) renal disease   Endo:     (+) thyroid problem .   (-) Type I DM and Type II DM   Psychiatric:         Infectious Disease:  - neg infectious disease ROS       Malignancy:   (+) Malignancy           Other:    (+) H/O Chronic Pain,                   Physical Exam  Normal systems: cardiovascular, pulmonary and dental    Airway   Mallampati: II  TM distance: >3 FB  Neck ROM: full    Dental     Cardiovascular   Rhythm and rate: regular and normal      Pulmonary    breath sounds clear to auscultation                    Anesthesia Plan      History & Physical Review  History and physical reviewed and following examination; no interval change.    ASA Status:  3 .        Plan for MAC (with GA backup) with Intravenous induction. Maintenance will be TIVA.  Reason for MAC:  Deep or markedly invasive procedure (G8)  PONV prophylaxis:  Ondansetron       Postoperative  Care  Postoperative pain management:  IV analgesics.      Consents  Anesthetic plan, risks, benefits and alternatives discussed with:  Patient..        Malik Vela MD  10:36 AM October 3, 2017                       .

## 2017-10-03 NOTE — OP NOTE
BMT ONC Adult Bone Marrow Biopsy Procedure Note  October 3, 2017  There were no vitals taken for this visit.      Learning needs assessment complete within 12 months? YES     DIAGNOSIS: B cell lymphoma      PROCEDURE: Unilateral Bone Marrow Biopsy     [place] [facility] [place]     Patient s identification was positively verified by verbal identification and invasive procedure safety checklist was completed. Informed consent was obtained. Following the administration of Propofol as pre-medication, patient was placed in the prone position and prepped and draped in a sterile manner. Approximately 20 cc of 1% Lidocaine was used over the left posterior iliac spine. Following this a 3 mm incision was made. Trephine bone marrow core(s) was (were) obtained from the [facility]. Bone marrow aspirates were obtained from the [facility]. Aspirates were sent for morphology, immunophenotyping, cytogenetics and molecular diagnostics (process and hold). A total of approximately 20 ml of marrow was aspirated. Following this procedure a sterile dressing was applied to the bone marrow biopsy site(s). The patient was placed in the supine position to maintain pressure on the biopsy site. Post-procedure wound care instructions were given.      Complications: NO     Interventions: NO     Post procedure pain: 0 out of 10     Length of procedure:20 minutes or less       Procedure performed by: Amelia Watkins

## 2017-10-03 NOTE — ANESTHESIA POSTPROCEDURE EVALUATION
Patient: Tisha Arias    Procedure(s):  Bone Marrow Biopsy with aspirate - Wound Class: I-Clean    Diagnosis:High Grade B Cell Lymph  Diagnosis Additional Information: No value filed.    Anesthesia Type:  MAC    Note:  Anesthesia Post Evaluation    Patient location during evaluation: Phase 2  Patient participation: Able to fully participate in evaluation  Level of consciousness: awake and alert  Pain management: adequate  Airway patency: patent  Cardiovascular status: acceptable  Respiratory status: acceptable  Hydration status: acceptable  PONV: none     Anesthetic complications: None          Last vitals:  Vitals:    10/03/17 1148 10/03/17 1200 10/03/17 1225   BP: 120/71 115/65 125/72   Resp: 16 16 16   Temp: 36.6  C (97.9  F)  36.7  C (98  F)   SpO2: 98% 97% 97%         Electronically Signed By: Malik Vela MD  October 3, 2017  2:27 PM

## 2017-10-03 NOTE — MR AVS SNAPSHOT
After Visit Summary   10/3/2017    Tisha Arias    MRN: [id number]           Patient Information     Date Of Birth          1960        Visit Information        Provider Department      10/3/2017 11:00 AM UU BONE MARROW BIOPSY; [facility] BONE MARROW BIOPSY SANJANA [facility] Blood and Marrow Transplant        Today's Diagnoses     B-cell lymphoma of intrapelvic lymph nodes, unspecified B-cell lymphoma type (H)              [facility] ([facility])  [address] [place] [address]  Phone: [phone]  Clinic Hours:   Monday-Thursday:7am to 7pm   Friday: 7am to 5pm   Weekends and holidays:    8am to noon (in general)  If your fever is 100.5  or greater,   call the clinic.  After hours call the   hospital at [phone] or   [phone]. Ask for the BMT   fellow on-call            Follow-ups after your visit        Your next 10 appointments already scheduled     Oct 04, 2017   Procedure with Jc Goodman PA-C   [facility] Surgery [facility] Procedure [facility] ([facility])    [address] [place] [address] [phone]           Located in the Clinics and Surgery Center at [address] [place] [address].   parking is very convenient and highly recommended.  is a $6 flat rate fee.  Both  and self parkers should enter the main arrival plaza from [facility]; parking attendants will direct you based on your parking preference.            Oct 04, 2017  8:00 AM CDT   (Arrive by 6:30 AM)   IR CHEST PORT PLACEMENT > 5 YRS OF AGE with [id number]   [facility] ASC Imaging ([facility])    [address] [place] [address]  5th [facility] [address]              1. Your doctor will need to do a history and physical within 7 days before this procedure. 2. Your doctor will which medications should not be taken the morning of the exam. 3. Laboratory tests are to be obtained by your doctor  prior to the exam (Basic Metabolic Panel, CBCP, PTT and INR) (No labs needed if you are having a tunneled catheter exchange or removal) 4. If you have allergies to x-ray contrast or iodine, contact your doctor or a Radiology nurse prior to the exam day for instructions. 5. Someone will need to drive you to and from the hospital. 6. If you are or may be pregnant, contact your doctor or a Radiology nurse prior to the day of the exam. 7. If you have diabetes, check with your doctor or a Radiology nurse to see if your insulin needs to be adjusted for the exam. 8. If you are taking a medication called Glucophage or Glucovance; these medications need to be held the day of the exam and for approximately 48 hours following. A blood sample must be drawn so your creatinine level can be checked before resuming this medication. 9. If you are taking Coumadin (to thin you blood) please contact your doctor or a Radiology nurse at least 3 days before the exam for special instructions. 10. You should not have received contrast within 48 hours of this exam. 11. The day before your exam you may eat your regular diet and are encouraged to drink at least 2 quarts of clear liquids. Drink no alcoholic beverages for 24 hours prior to the exam. 12. If you have a colostomy you will need to irrigate it with tap water at 8PM the evening before and again at 6AM the morning of the exam. 13. Do not smoke for 24 hours prior to the procedure. 14. Birth to 4 years: - Breast feeding must be stopped 4 hours prior to exam - Solid food or formula must be stopped 6 hours prior to exam - Tube feedings must be stopped 6 hours prior to exam 15. 4-10 years old: - Nothing to eat or drink 6 hours prior to exam 16. 10+ years old: - Nothing to eat or drink 8 hours prior to exam 17. The morning of the exam you may brush your teeth and take medications as directed with a sip of water. 18. When discharged, you cannot drive until morning, and an adult must be with  you until then. You should stay in the [facility] overnight. 19. Bring a list of all drugs you are taking; include supplements and over-the-counter medications. Wear comfortable clothes and leave your valuables at home.            Oct 04, 2017  1:30 PM CDT   MR MYOCARDIUM W CONTRAST with UUMR4   [facility], MRI ([facility])    [address] [facility] [address] [phone]           Take your medicines as usual, unless your doctor tells you not to. Bring a list of your current medicines to your exam (including vitamins, minerals and over-the-counter drugs).  You will be given intravenous contrast for this exam. To prepare:   The day before your exam, drink extra fluids at least six 8-ounce glasses (unless your doctor tells you to restrict your fluids).   Have a blood test (creatinine test) within 30 days of your exam. Go to your clinic or Diagnostic Imaging Department for this test.  The MRI machine uses a strong magnet. Please wear clothes without metal (snaps, zippers). A sweatsuit works well, or we may give you a hospital gown.  Please remove any body piercings and hair extensions before you arrive. You will also remove watches, jewelry, hairpins, wallets, dentures, partial dental plates and hearing aids. You may wear contact lenses, and you may be able to wear your rings. We have a safe place to keep your personal items, but it is safer to leave them at home.   **IMPORTANT** THE INSTRUCTIONS BELOW ARE ONLY FOR THOSE PATIENTS WHO HAVE BEEN TOLD THEY WILL RECEIVE SEDATION OR GENERAL ANESTHESIA DURING THEIR MRI PROCEDURE:  IF YOU WILL RECEIVE SEDATION (take medicine to help you relax during your exam):   You must get the medicine from your doctor before you arrive. Bring the medicine to the exam. Do not take it at home.   Arrive one hour early. Bring someone who can take you home after the test. Your medicine will make you sleepy. After  the exam, you may not drive, take a bus or take a taxi by yourself.   No eating 8 hours before your exam. You may have clear liquids up until 4 hours before your exam. (Clear liquids include water, clear tea, black coffee and fruit juice without pulp.)  IF YOU WILL RECEIVE ANESTHESIA (be asleep for your exam):   Arrive 1 1/2 hours early. Bring someone who can take you home after the test. You may not drive, take a bus or take a taxi by yourself.   No eating 8 hours before your exam. You may have clear liquids up until 4 hours before your exam. (Clear liquids include water, clear tea, black coffee and fruit juice without pulp.)  Please call the Imaging Department at your exam site with any questions.            Nov 02, 2017  5:00 PM CDT   US HEAD NECK SOFT TISSUE with UCUS3   [facility] ([facility])    [address] [place] [id number] [phone]           Please bring a list of your medicines (including vitamins, minerals and over-the-counter drugs). Also, tell your doctor about any allergies you may have. Wear comfortable clothes and leave your valuables at home.  You do not need to do anything special to prepare for your exam.  Please call the Imaging Department at your exam site with any questions.            Nov 02, 2017  6:00 PM CDT   LAB with  LAB   [facility] ([facility])    [address] [place] [id number] [phone]           Patient must bring picture ID. Patient should be prepared to give a urine specimen  Please do not eat 10-12 hours before your appointment if you are coming in fasting for labs on lipids, cholesterol, or glucose (sugar). Pregnant women should follow their Care Team instructions. Water with medications is okay. Do not drink coffee or other fluids. If you have concerns about taking  your medications, please ask at office or if scheduling via Wireless Toyz, send a  message by clicking on Secure Messaging, Message Your Care Team.            Nov 13, 2017  4:30 PM CST   (Arrive by 4:15 PM)   RETURN ENDOCRINE with Avelina Castro MD   [facility] ([facility])    [address] [place] [address]  3rd [facility] [id number] [phone]            Nov 27, 2017  4:00 PM CST   (Arrive by 3:45 PM)   Return Visit with Shahla Crawley MD   [facility] ([facility])    [address] [place] [address]  2nd [facility] [address] [phone]              Who to contact     If you have questions or need follow up information about today's clinic visit or your schedule please contact [facility]ARROW [facility] directly at [phone].  Normal or non-critical lab and imaging results will be communicated to you by MyChart, letter or phone within 4 business days after the clinic has received the results. If you do not hear from us within 7 days, please contact the clinic through Poikoshart or phone. If you have a critical or abnormal lab result, we will notify you by phone as soon as possible.  Submit refill requests through roundCorner or call your pharmacy and they will forward the refill request to us. Please allow 3 business days for your refill to be completed.          Additional Information About Your Visit        Poikoshart Information     roundCorner gives you secure access to your electronic health record. If you see a primary care provider, you can also send messages to your care team and make appointments. If you have questions, please call your primary care clinic.  If you do not have a primary care provider, please call [phone] and they will assist you.        Care EveryWhere ID     This is your Care EveryWhere ID. This could be used by other organizations to access your [place] medical records  [id number]         Blood Pressure from Last 3 Encounters:   10/03/17 125/72   10/02/17 115/77    09/25/17 132/90    Weight from Last 3 Encounters:   10/03/17 87.7 kg (193 lb 4.8 oz)   09/25/17 87.6 kg (193 lb 3.2 oz)   09/15/17 87.9 kg (193 lb 12.6 oz)              We Performed the Following     Bone marrow biopsy     CHROMOSOME BONE MARROW With Professional Interpretation     Leukemia Lymphoma Evaluation (Flow Cytometry)     Process and hold DNA - Bone Marrow        Recent Review Flowsheet Data     BMT Recent Results Latest Ref Rng & Units 4/26/2017 9/1/2017 9/6/2017 9/7/2017 9/14/2017 9/27/2017 10/2/2017    WBC 4.0 - 11.0 10e9/L - 5.2 - 3.7(L) 7.1 9.9 7.7    Hemoglobin 11.7 - 15.7 g/dL - 11.0(L) - 10.5(L) 11.0(L) 11.3(L) 10.7(L)    Platelet Count 150 - 450 10e9/L - 225 - 258 319 232 214    Neutrophils (Absolute) 1.6 - 8.3 10e9/L - 3.3 - 1.9 - 6.7 -    INR 0.86 - 1.14 - 1.00 - - 1.02 1.02 1.14    Sodium 133 - 144 mmol/L - 143 - 139 - 140 -    Potassium 3.4 - 5.3 mmol/L - 3.6 - 3.9 - 3.4 3.6    Chloride 94 - 109 mmol/L - 108 - 104 - 104 -    Glucose 70 - 99 mg/dL - 117(H) 108(H) 111(H) - 91 -    Urea Nitrogen 7 - 30 mg/dL - 12 - 13 15 15 -    Creatinine 0.52 - 1.04 mg/dL 0.83 0.82 - 0.85 - 0.86 -    Calcium (Total) 8.5 - 10.1 mg/dL 8.3(L) 8.6 - 8.6 - 8.3(L) -    Protein (Total) 6.8 - 8.8 g/dL - 7.6 - 7.3 - 7.2 -    Albumin 3.4 - 5.0 g/dL - 3.5 - 3.3(L) - 3.7 -    Alkaline Phosphatase 40 - 150 U/L - 74 - 70 - 66 -    AST 0 - 45 U/L - 23 - 18 - 12 -    ALT 0 - 50 U/L - 26 - 22 - 26 -    MCV 78 - 100 fl - 81 - 79 82 81 80               Primary Care Provider Office Phone # Fax #    Shahla Raul Crawley [phone]       [address] [place] [address]  [facility] [address]        Equal Access to Services     RODRIGO ZAMORA : Hadii sacha Venegas, girish ge, timmy lama, ranjan martin. So [facility] [phone].    ATENCIÓN: Si habla español, tiene a stiles disposición servicios gratuitos de asistencia lingüística. Llame al [phone].    We comply with  applicable federal civil rights laws and [place] laws. We do not discriminate on the basis of race, color, national origin, age, disability, sex, sexual orientation, or gender identity.            Thank you!     Thank you for choosing [facility]ARR[facility]  for your care. Our goal is always to provide you with excellent care. Hearing back from our patients is one way we can continue to improve our services. Please take a few minutes to complete the written survey that you may receive in the mail after your visit with us. Thank you!             Your Updated Medication List - Protect others around you: Learn how to safely use, store and throw away your medicines at www.disposemymeds.org.          This list is accurate as of: 10/3/17 12:55 PM.  Always use your most recent med list.                   Brand Name Dispense Instructions for use Diagnosis    ACAI PO      Take 1,000 mg by mouth 2 times daily    Breast cancer, unspecified laterality, Thyroid cancer (H), Chronic arthralgias of knees and hips, unspecified laterality       aluminum chloride 20 % external solution    DRYSOL    60 mL    Apply topically At Bedtime    Rash, Intertrigo       AZMACORT IN      Inhale 2 puffs into the lungs as needed        BENADRYL PO      Take 50 mg by mouth as needed        BETAINE HCL PO      Take 2 tablets by mouth as needed Betaine HCL and Pepsin: Take 1-7 tabs by mouth daily, if burning take one tablet less once daily. Betaine HCL 1.04 g Pepsin 40 mg        calcitRIOL 0.25 MCG capsule    ROCALTROL    270 capsule    2 tabs in am and 1 tab qhs    Postsurgical hypothyroidism, Papillary carcinoma, follicular variant (H), Metastasis to cervical lymph node (H)       CALCIUM CITRATE +D 315-250 MG-UNIT Tabs per tablet   Generic drug:  calcium citrate-vitamin D     120 tablet    Take 2 tablets by mouth 3 times daily        celecoxib 200 MG capsule    celeBREX    180 capsule    Take 1 capsule (200 mg) by mouth 2 times daily     Chest wall pain       chlorhexidine 0.12 % solution    PERIDEX          Colchicine 0.6 MG Caps     270 capsule    Take 0.6 mg by mouth 3 times daily for costochondritis (chest) discomfort. Scale back use to prn with loose stools or bloating.    Costal chondritis       COMPOUNDED NON-CONTROLLED SUBSTANCE - PHARMACY TO MIX COMPOUNDED MEDICATION    CMPD RX    120 g    Apply small amount to affected area two times daily.Ketamine 6% + ketoprofen 10% + lidocaine 10% in Lipo.    Neoplasm related pain       cromolyn 4 % ophthalmic solution    OPTICROM    10 mL    Place 1 drop into both eyes 4 times daily    Idiopathic mast cell activation syndrome (H)       cyclobenzaprine 10 MG tablet    FLEXERIL     Take 10 mg by mouth 3 times daily as needed On hold Dr Monsalve        dexamethasone 4 MG tablet    DECADRON    60 tablet    Take 1 tablet (4 mg) by mouth 2 times daily (with meals) Or as directed by MD    High grade B-cell lymphoma (H)       diazepam 5 MG tablet    VALIUM    90 tablet    Take 1 tablet (5 mg) by mouth 3 times daily as needed For muscle spasms    Chest wall pain       diclofenac 1 % Gel topical gel    VOLTAREN    100 g    Apply affected area two times daily PRN using enclosed dosing card.    Myofascial pain       docusate sodium 100 MG tablet    COLACE    60 tablet    Take 100 mg by mouth daily    Acute constipation       EPINEPHrine 0.3 MG/0.3ML injection 2-pack    EPIPEN 2-CARIDAD    0.6 mL    Inject 0.3 mLs (0.3 mg) into the muscle once as needed for anaphylaxis        hydrochlorothiazide 12.5 MG capsule    MICROZIDE    90 capsule    Take 1 capsule (12.5 mg) by mouth daily    Hypocalcemia       levothyroxine 112 MCG tablet    SYNTHROID/LEVOTHROID    189 tablet    Mon-Sat: (2 tabs daily) Sunday: 3 tabs    Postsurgical hypothyroidism, Papillary carcinoma, follicular variant (H), Postsurgical hypoparathyroidism (H), Thyroid cancer (H)       lidocaine 5 % ointment    XYLOCAINE    35.44 g    SANJANA TOPICALLY QID PRN     Chest wall pain       Lidocaine HCl Monohydrate Powd           lidocaine-prilocaine cream    EMLA    30 g    Apply topically as needed (port access pain.)    Neoplasm related pain, Chest wall pain       MAGNESIUM CITRATE PO      Take 400 mg by mouth daily        methocarbamol 750 MG tablet    ROBAXIN    360 tablet    Take 1 tablet (750 mg) by mouth 4 times daily as needed . Ok to take a 5th Robaxin on very severe days.    Myofascial pain       mometasone 110 MCG/INH inhaler    ASMANEX 30 METERED DOSES    3 Inhaler    Inhale 1 puff into the lungs daily    Intermittent asthma, uncomplicated       montelukast 10 MG tablet    SINGULAIR    120 tablet    TAKE TWO TABLETS BY MOUTH TWICE DAILY    Idiopathic mast cell activation syndrome (H)       morphine 30 MG 12 hr tablet    MS CONTIN    120 tablet    Take 1 tablet (30 mg) by mouth every 8 hours . Take an addition pill at night such that your evening dose is 60mg    Neoplasm related pain       NASALCROM 5.2 MG/ACT Aers Inhaler   Generic drug:  cromolyn sodium     1 Bottle    SPRAY ONE SPRAY( 1 ML) IN NOSTRIL DAILY    Mast cell disease, systemic       OMEGA 3 PO      Take 5 mLs by mouth        ondansetron 4 MG ODT tab    ZOFRAN ODT    60 tablet    Take 1-2 tablets (4-8 mg) by mouth every 8 hours as needed for nausea or vomiting    Nausea with vomiting       * order for DME     2 Units    Equipment being ordered: compression stockings. 20-30 mm or 30 - 40 mm as patient can tolerate. Physical therapy to determine if they should be above or below the knee.    Venous stasis       * order for DME     2 Device    Equipment being ordered: compression bra    Malignant neoplasm of right female breast, unspecified site of breast       oxyCODONE 10 MG IR tablet    ROXICODONE    180 tablet    Take 1.5-2 tablets (15-20 mg) by mouth every 4 hours as needed    Neoplasm related pain       polyethylene glycol powder    MIRALAX    510 g    Take 17 g by mouth daily    Acute constipation        potassium chloride SA 20 MEQ CR tablet    KLOR-CON    90 tablet    Take 1 tablet (20 mEq) by mouth daily    Hypokalemia       PROBIOTIC DAILY PO      Take 1 capsule by mouth daily Lacto acid bifidobacterium    Breast cancer, unspecified laterality, Thyroid cancer (H), Chronic arthralgias of knees and hips, unspecified laterality       ranitidine 75 MG tablet    ZANTAC    270 tablet    Take 1 tablet (75 mg) by mouth 3 times daily    Mast cell disease, systemic       SUMAtriptan 50 MG tablet    IMITREX    5 tablet    Take 1 tablet (50 mg) by mouth at onset of headache for migraine (may repeat in 2 hours as needed, max 2 tablets daily)    Migraine without status migrainosus, not intractable, unspecified migraine type       tacrolimus 0.1 % ointment    PROTOPIC    60 g    Apply topically 2 times daily    Rash, Intertrigo       tamoxifen 20 MG tablet    NOLVADEX    90 tablet    Take 1 tablet (20 mg) by mouth daily    Malignant neoplasm of female breast, unspecified laterality, unspecified site of breast       triamcinolone 0.025 % ointment    KENALOG    80 g    Apply topically 2 times daily Mix with 1 lb jar of vaseline or aquaphor    Intertrigo, Rash       TUMS 500 MG chewable tablet   Generic drug:  calcium carbonate     180 chew tab    Take 2 tablets (1,000 mg) by mouth nightly as needed for other (cramps)        * UNABLE TO FIND      1 tablet 3 times daily MEDICATION NAME: calcium D-Glucarate        * UNABLE TO FIND      2 tablets 3 times daily MEDICATION NAME: Natural D-Hist    Breast cancer, unspecified laterality, Papillary carcinoma, follicular variant (H), Chronic musculoskeletal pain       * UNABLE TO FIND      MEDICATION NAME: Tumeric 3 capsules daily        * UNABLE TO FIND      1 tablet 3 times daily MEDICATION NAME: Digestzymes    Thyroid cancer (H), Postsurgical hypothyroidism, Postsurgical hypoparathyroidism (H)       * UNABLE TO FIND      1 tablet daily MEDICATION NAME: Pure Encapsulations    Thyroid  cancer (H), Postsurgical hypothyroidism, Postsurgical hypoparathyroidism (H)       VENTOLIN  (90 BASE) MCG/ACT Inhaler   Generic drug:  albuterol     18 g    INHALE 2 PUFFS INTO THE LUNGS EVERY 4 HOURS AS NEEDED FOR SHORTNESS OF BREATH OR DIFFICULT BREATHING OR WHEEZING    Mild intermittent asthma without complication       vitamin D3 2000 UNITS Caps     60 capsule    Take 10,000 Units by mouth daily    Thyroid cancer (H), Postsurgical hypothyroidism, Papillary carcinoma, follicular variant (H), Postsurgical hypoparathyroidism (H)       ZYRTEC ALLERGY 10 MG tablet   Generic drug:  cetirizine     90 tablet    Take 1 tablet (10 mg) by mouth 3 times daily On hold for lab test.        * Notice:  This list has 7 medication(s) that are the same as other medications prescribed for you. Read the directions carefully, and ask your doctor or other care provider to review them with you.

## 2017-10-03 NOTE — TELEPHONE ENCOUNTER
Called [employer] Appeal dept at [phone] and per representative the appeal has been denied. Letters are only mailed out to the patient and not sent/faxed to prescribers. Representative stated that the plan only covers if all ingrediants are covered and the ketoprofen powder is not, it is a plan exclusion. She stated that a second level of appeal can be done where an outside review board does the review, we would just send the request to the same fax number ([phone]) with a letter of medical necessity ATTN: level 2. And call the same number [phone] for updates.    MEDICATION APPEAL DENIED    Medication: FV-Ketamine 6%, Lidocaine 10% ketoprofen 10 % in PLO- Denied; Appeal initiated    Denial Date: 10/3/2017    Denial Rational: plan only covers if all ingrediants are covered and the ketoprofen powder is not, it is a plan exclusion    Second Level Appeal Information: fax number [phone]) with a letter of medical necessity ATTN: level 2    Second level appeals will be managed by the clinic staff and provider. Please contact the Medicine in Practice Prior Authorization Team if additional information about the denial is needed.

## 2017-10-03 NOTE — IP AVS SNAPSHOT
[facility]    [address] [place] [address]    Phone:  [phone]    Fax:  [phone]                                       After Visit Summary   10/3/2017    Tisha Arias    MRN: [id number]           After Visit Summary Signature Page     I have received my discharge instructions, and my questions have been answered. I have discussed any challenges I see with this plan with the nurse or doctor.    ..........................................................................................................................................  Patient/Patient Representative Signature      ..........................................................................................................................................  Patient Representative Print Name and Relationship to Patient    ..................................................               ................................................  Date                                            Time    ..........................................................................................................................................  Reviewed by Signature/Title    ...................................................              ..............................................  Date                                                            Time

## 2017-10-03 NOTE — DISCHARGE INSTRUCTIONS
How to Care for your Bone Marrow Biopsy    Activity  Relax and take it easy for the next 24 hours.   Resume regular activity after 24 hours.    Diet   Resume pre-procedure diet and drink plenty of fluids.    If you received sedation, you may feel a little nauseated so start with a clear liquid diet until the nausea passes.    Do Not Immerse Bone Marrow Biopsy Puncture Site in Water  Do not take a bath until the puncture site has healed.  Do not sit in a hot tub or spa until the puncture site has healed.  Do not swim until the puncture site has healed.  Wait 24 hours before taking a shower.    Drainage  Drainage should be minimal.  IF bleeding should occur and soaks through the dressing, lie down and put pressure on the puncture site.    IF bleeding persists, apply gentle pressure with your hand over the dressing for 5 minutes.    IF the pressure doesn't stop the bleeding, contact your provider immediately.    Dressing  Keep the dressing dry and in place for 24 hours, unless instructed otherwise.    IF bleeding soaks through the dressing in the first 24 hours do NOT remove the dressing as you may pull off any scab that has formed.  Instead, reinforce the dressing with extra gauze and tape.    No Alcohol  Do not drink alcoholic beverages for the next 24 hours.    No Driving or Operating Machinery  No driving or operating machinery for the next 24 hours.    Notify your provider IF:    Excessive bleeding or drainage at the puncture site    Excessive swelling, redness or tenderness at the puncture site    Fever above 100.5 degrees taken orally    Severe pain    Drainage that is green, yellow, thick white or has a bad odor    Telephone Numbers  Bone Marrow transplant clinic:  [phone] (Monday thru Friday, 8:00 am to 4:00 pm)  After business hours call the [facility]:  [phone] and ask for the Hematology/BMT doctor on call.  Or call the Emergency Room at the [facility]  [facility]:  [phone].      [facility]  Home Care Following Anesthesia  For 24 hours after surgery:  1. Get plenty of rest.  A responsible adult must stay with you for at least 24 hours after you leave the surgery center.  2. Do not drive or use heavy equipment.  If you have weakness or tingling, don't drive or use heavy equipment until this feeling goes away.   3. Do not drink alcohol.   4. Avoid strenuous or risky activities.  Ask for help when climbing stairs.  5. You may feel lightheaded.  IF so, sit for a few minutes before standing.  Have someone help you get up.   6. If you have nausea (feel sick to your stomach): Drink only clear liquids such as apple juice, ginger ale, broth or 7-Up.  Rest may also help.  Be sure to drink enough fluids.  Move to a regular diet as you feel able.   7. You may have a slight fever.  Call the doctor if your fever is over 100 F (37.7 C) (taken under the tongue) or lasts longer than 24 hours.  8. You may have a dry mouth, a sore throat, muscle aches or trouble sleeping. These should go away after 24 hours.  9. Do not make important or legal decisions.               Tips for taking pain medications  To get the best pain relief possible, remember these points:    Take pain medications as directed, before pain becomes severe.    Pain medication can upset your stomach: taking it with food may help.    Constipation is a common side effect of pain medication. Drink plenty of  fluids.    Eat foods high in fiber. Take a stool softener if recommended by your doctor or pharmacist.    Do not drink alcohol, drive or operate machinery while taking pain medications.    Ask about other ways to control pain, such as with heat, ice or relaxation.    Tylenol/Acetaminophen Consumption  To help encourage the safe use of acetaminophen, the makers of TYLENOL  have lowered the maximum daily dose for single-ingredient Extra Strength TYLENOL  (acetaminophen)  products sold in the [place] from 8 pills per day (4,000 mg) to 6 pills per day (3,000 mg). The dosing interval has also changed from 2 pills every 4-6 hours to 2 pills every 6 hours.    If you feel your pain relief is insufficient, you may take Tylenol/Acetaminophen in addition to your narcotic pain medication.     Be careful not to exceed 3,000 mg of Tylenol/Acetaminophen in a 24 hour period from all sources.    If you are taking extra strength Tylenol/acetaminophen (500 mg), the maximum dose is 6 tablets in 24 hours.    If you are taking regular strength acetaminophen (325 mg), the maximum dose is 9 tablets in 24 hours.    Call a doctor for any of the followin. Signs of infection (fever, growing tenderness at the surgery site, a large amount of drainage or bleeding, severe pain, foul-smelling drainage, redness, swelling).  2. It has been over 8 to 10 hours since surgery and you are still not able to urinate (pass water).  3. Headache for over 24 hours.  4. Numbness, tingling or weakness the day after surgery (if you had spinal anesthesia).  Your doctor is:                     Amelia Watkins PA-C    Or dial [phone] and ask for the resident on call for:  BMT  For emergency care, call the:  [facility]:  [phone] (TTY for hearing impaired: [phone])

## 2017-10-03 NOTE — ANESTHESIA CARE TRANSFER NOTE
Patient: Tisha Arias    Procedure(s):  Bone Marrow Biopsy with aspirate - Wound Class: I-Clean    Diagnosis: High Grade B Cell Lymph  Diagnosis Additional Information: No value filed.    Anesthesia Type:   MAC     Note:  Airway :Room Air  Patient transferred to:Phase II  Comments: VSS/WNL. Responds well. C/o nausea      Vitals: (Last set prior to Anesthesia Care Transfer)    CRNA VITALS  10/3/2017 1113 - 10/3/2017 1149      10/3/2017             Resp Rate (set): 10                Electronically Signed By: DENZEL Moore CRNA  October 3, 2017  11:49 AM

## 2017-10-03 NOTE — IP AVS SNAPSHOT
MRN:[id number]                      After Visit Summary   10/3/2017    Tisha Arias    MRN: [id number]           Thank you!     Thank you for choosing [facility] for your care. Our goal is always to provide you with excellent care. Hearing back from our patients is one way we can continue to improve our services. Please take a few minutes to complete the written survey that you may receive in the mail after you visit with us. Thank you!        Patient Information     Date Of Birth          1960        About your hospital stay     You were admitted on:  October 3, 2017 [facility]    You were discharged on:  October 3, 2017       Who to Call     For medical emergencies, please call 911.  For non-urgent questions about your medical care, please call your primary care provider or clinic, [phone]  For questions related to your surgery, please call your surgery clinic        Attending Provider     Provider Specialty    Carrier, Amelia DUPREE PA-C Physician Assistant       Primary Care Provider Office Phone # Fax #    Shahla Crawley [phone]      After Care Instructions     Activity       Relax and take it easy for 24 hours.  Resume regular activity after 24 hours.            Diet Instructions       Resume pre-procedure diet and drink plenty of fluids.  If you received sedation, you may feel a little nauseated, so start with a clear liquid diet until the nausea passes.            Do not  immerse bone marrow biopsy puncture site in water       Do not immerse puncture site in water.  Do not take a bath, sit in a hot tub, spa or swim until the puncture site has healed.            Drainage       Drainage should be minimal.  IF bleeding should occur and soaks through the dressing, lie down and put pressure on the puncture site.  IF bleeding persists, apply gentle pressure with your hand over the dressing for 5  minutes.  IF the pressure doesn't stop the bleeding, contact your Provider immediately.            Dressing       Keep the dressing dry and in place for 24 hours, unless instructed otherwise.  IF bleeding soaks through the dressing in the first 24 hours do NOT remove the dressing as you may pull off any scab that has formed.  Instead, reinforce the dressing with extra gauze and tape.            No Alcohol       For 24 hours post procedure            No driving or operating machinery       No driving or operating machinery until the day after bone marrow biopsy.            Notify Provider       Notify Provider IF:  ~ Excessive bleeding or drainage at the puncture site,  ~ Excessive swelling, redness or tenderness at the puncture site,  ~ Fever above 100.5* orally,  ~ Severe pain,  ~ Drainage that is green, yellow, thick white or has a bad odor,    Telephone Numbers:   ~ [facility]:  [phone] (Monday through Friday, 8:00 am to 4:00 pm),  ~ [facility]:  [phone].  (After normal business hours, weekend, or holiday, ask for the Hematology/BMT doctor on call),  ~ Emergency Room ([facility]): [phone]              Shower       No shower for 24 hours post procedure. May shower Postoperative Day 1.                  Your next 10 appointments already scheduled     Oct 04, 2017   Procedure with Jc Goodman PA-C   [facility] ([facility])    [address] [place] [id number] [phone]           Located in the Clinics and Surgery Center at [address] [place] [address].   parking is very convenient and highly recommended.  is a $6 flat rate fee.  Both  and self parkers should enter the main arrival plaza from [facility]; parking attendants will direct you based on your parking preference.            Oct 04, 2017  8:00 AM  CDT   (Arrive by 6:30 AM)   IR CHEST PORT PLACEMENT > 5 YRS OF AGE with [id number]   [facility] ([facility])    [address]  5th Floor  [facility] [address]              1. Your doctor will need to do a history and physical within 7 days before this procedure. 2. Your doctor will which medications should not be taken the morning of the exam. 3. Laboratory tests are to be obtained by your doctor prior to the exam (Basic Metabolic Panel, CBCP, PTT and INR) (No labs needed if you are having a tunneled catheter exchange or removal) 4. If you have allergies to x-ray contrast or iodine, contact your doctor or a Radiology nurse prior to the exam day for instructions. 5. Someone will need to drive you to and from the hospital. 6. If you are or may be pregnant, contact your doctor or a Radiology nurse prior to the day of the exam. 7. If you have diabetes, check with your doctor or a Radiology nurse to see if your insulin needs to be adjusted for the exam. 8. If you are taking a medication called Glucophage or Glucovance; these medications need to be held the day of the exam and for approximately 48 hours following. A blood sample must be drawn so your creatinine level can be checked before resuming this medication. 9. If you are taking Coumadin (to thin you blood) please contact your doctor or a Radiology nurse at least 3 days before the exam for special instructions. 10. You should not have received contrast within 48 hours of this exam. 11. The day before your exam you may eat your regular diet and are encouraged to drink at least 2 quarts of clear liquids. Drink no alcoholic beverages for 24 hours prior to the exam. 12. If you have a colostomy you will need to irrigate it with tap water at 8PM the evening before and again at 6AM the morning of the exam. 13. Do not smoke for 24 hours prior to the procedure. 14. Birth to 4 years: - Breast feeding must be stopped 4 hours prior  to exam - Solid food or formula must be stopped 6 hours prior to exam - Tube feedings must be stopped 6 hours prior to exam 15. 4-10 years old: - Nothing to eat or drink 6 hours prior to exam 16. 10+ years old: - Nothing to eat or drink 8 hours prior to exam 17. The morning of the exam you may brush your teeth and take medications as directed with a sip of water. 18. When discharged, you cannot drive until morning, and an adult must be with you until then. You should stay in the [facility] overnight. 19. Bring a list of all drugs you are taking; include supplements and over-the-counter medications. Wear comfortable clothes and leave your valuables at home.            Oct 04, 2017  1:30 PM CDT   MR MYOCARDIUM W CONTRAST with [id number]   [facility] [place] ([facility])    [address] [address] [phone]           Take your medicines as usual, unless your doctor tells you not to. Bring a list of your current medicines to your exam (including vitamins, minerals and over-the-counter drugs).  You will be given intravenous contrast for this exam. To prepare:   The day before your exam, drink extra fluids at least six 8-ounce glasses (unless your doctor tells you to restrict your fluids).   Have a blood test (creatinine test) within 30 days of your exam. Go to your clinic or Diagnostic Imaging Department for this test.  The MRI machine uses a strong magnet. Please wear clothes without metal (snaps, zippers). A sweatsuit works well, or we may give you a hospital gown.  Please remove any body piercings and hair extensions before you arrive. You will also remove watches, jewelry, hairpins, wallets, dentures, partial dental plates and hearing aids. You may wear contact lenses, and you may be able to wear your rings. We have a safe place to keep your personal items, but it is safer to leave them at home.   **IMPORTANT** THE INSTRUCTIONS BELOW  ARE ONLY FOR THOSE PATIENTS WHO HAVE BEEN TOLD THEY WILL RECEIVE SEDATION OR GENERAL ANESTHESIA DURING THEIR MRI PROCEDURE:  IF YOU WILL RECEIVE SEDATION (take medicine to help you relax during your exam):   You must get the medicine from your doctor before you arrive. Bring the medicine to the exam. Do not take it at home.   Arrive one hour early. Bring someone who can take you home after the test. Your medicine will make you sleepy. After the exam, you may not drive, take a bus or take a taxi by yourself.   No eating 8 hours before your exam. You may have clear liquids up until 4 hours before your exam. (Clear liquids include water, clear tea, black coffee and fruit juice without pulp.)  IF YOU WILL RECEIVE ANESTHESIA (be asleep for your exam):   Arrive 1 1/2 hours early. Bring someone who can take you home after the test. You may not drive, take a bus or take a taxi by yourself.   No eating 8 hours before your exam. You may have clear liquids up until 4 hours before your exam. (Clear liquids include water, clear tea, black coffee and fruit juice without pulp.)  Please call the Imaging Department at your exam site with any questions.            Nov 02, 2017  5:00 PM CDT   US HEAD NECK SOFT TISSUE with UCUS3   [facility] ([facility])    [address] [place] [address] [phone]           Please bring a list of your medicines (including vitamins, minerals and over-the-counter drugs). Also, tell your doctor about any allergies you may have. Wear comfortable clothes and leave your valuables at home.  You do not need to do anything special to prepare for your exam.  Please call the Imaging Department at your exam site with any questions.            Nov 02, 2017  6:00 PM CDT   LAB with UC LAB   [facility] ([facility])    [address] [place] [address] [phone]           Patient must bring  picture ID. Patient should be prepared to give a urine specimen  Please do not eat 10-12 hours before your appointment if you are coming in fasting for labs on lipids, cholesterol, or glucose (sugar). Pregnant women should follow their Care Team instructions. Water with medications is okay. Do not drink coffee or other fluids. If you have concerns about taking  your medications, please ask at office or if scheduling via [employer], send a message by clicking on Secure Messaging, Message Your Care Team.            Nov 13, 2017  4:30 PM CST   (Arrive by 4:15 PM)   RETURN ENDOCRINE with Avelina Castro MD   [facility] ([facility])    [address] [place] [address]  3rd [facility] [id number] [phone]            Nov 27, 2017  4:00 PM CST   (Arrive by 3:45 PM)   Return Visit with Shahla Crawley MD   [facility] ([facility])    [address] [place] [id number] [phone]              Further instructions from your care team       How to Care for your Bone Marrow Biopsy    Activity  Relax and take it easy for the next 24 hours.   Resume regular activity after 24 hours.    Diet   Resume pre-procedure diet and drink plenty of fluids.    If you received sedation, you may feel a little nauseated so start with a clear liquid diet until the nausea passes.    Do Not Immerse Bone Marrow Biopsy Puncture Site in Water  Do not take a bath until the puncture site has healed.  Do not sit in a hot tub or spa until the puncture site has healed.  Do not swim until the puncture site has healed.  Wait 24 hours before taking a shower.    Drainage  Drainage should be minimal.  IF bleeding should occur and soaks through the dressing, lie down and put pressure on the puncture site.    IF bleeding persists, apply gentle pressure with your hand over the dressing for 5 minutes.    IF the pressure doesn't stop the bleeding,  contact your provider immediately.    Dressing  Keep the dressing dry and in place for 24 hours, unless instructed otherwise.    IF bleeding soaks through the dressing in the first 24 hours do NOT remove the dressing as you may pull off any scab that has formed.  Instead, reinforce the dressing with extra gauze and tape.    No Alcohol  Do not drink alcoholic beverages for the next 24 hours.    No Driving or Operating Machinery  No driving or operating machinery for the next 24 hours.    Notify your provider IF:    Excessive bleeding or drainage at the puncture site    Excessive swelling, redness or tenderness at the puncture site    Fever above 100.5 degrees taken orally    Severe pain    Drainage that is green, yellow, thick white or has a bad odor    Telephone Numbers  Bone Marrow transplant clinic:  [phone] (Monday thru Friday, 8:00 am to 4:00 pm)  After business hours call the [facility]:  [phone] and ask for the Hematology/BMT doctor on call.  Or call the Emergency Room at the [facility]:  [phone].      [facility]  Home Care Following Anesthesia  For 24 hours after surgery:  1. Get plenty of rest.  A responsible adult must stay with you for at least 24 hours after you leave the surgery center.  2. Do not drive or use heavy equipment.  If you have weakness or tingling, don't drive or use heavy equipment until this feeling goes away.   3. Do not drink alcohol.   4. Avoid strenuous or risky activities.  Ask for help when climbing stairs.  5. You may feel lightheaded.  IF so, sit for a few minutes before standing.  Have someone help you get up.   6. If you have nausea (feel sick to your stomach): Drink only clear liquids such as apple juice, ginger ale, broth or 7-Up.  Rest may also help.  Be sure to drink enough fluids.  Move to a regular diet as you feel able.   7. You may have a slight fever.  Call the  doctor if your fever is over 100 F (37.7 C) (taken under the tongue) or lasts longer than 24 hours.  8. You may have a dry mouth, a sore throat, muscle aches or trouble sleeping. These should go away after 24 hours.  9. Do not make important or legal decisions.               Tips for taking pain medications  To get the best pain relief possible, remember these points:    Take pain medications as directed, before pain becomes severe.    Pain medication can upset your stomach: taking it with food may help.    Constipation is a common side effect of pain medication. Drink plenty of  fluids.    Eat foods high in fiber. Take a stool softener if recommended by your doctor or pharmacist.    Do not drink alcohol, drive or operate machinery while taking pain medications.    Ask about other ways to control pain, such as with heat, ice or relaxation.    Tylenol/Acetaminophen Consumption  To help encourage the safe use of acetaminophen, the makers of TYLENOL  have lowered the maximum daily dose for single-ingredient Extra Strength TYLENOL  (acetaminophen) products sold in the [place] from 8 pills per day (4,000 mg) to 6 pills per day (3,000 mg). The dosing interval has also changed from 2 pills every 4-6 hours to 2 pills every 6 hours.    If you feel your pain relief is insufficient, you may take Tylenol/Acetaminophen in addition to your narcotic pain medication.     Be careful not to exceed 3,000 mg of Tylenol/Acetaminophen in a 24 hour period from all sources.    If you are taking extra strength Tylenol/acetaminophen (500 mg), the maximum dose is 6 tablets in 24 hours.    If you are taking regular strength acetaminophen (325 mg), the maximum dose is 9 tablets in 24 hours.    Call a doctor for any of the followin. Signs of infection (fever, growing tenderness at the surgery site, a large amount of drainage or bleeding, severe pain, foul-smelling drainage, redness, swelling).  2. It has been over 8 to 10 hours since surgery  "and you are still not able to urinate (pass water).  3. Headache for over 24 hours.  4. Numbness, tingling or weakness the day after surgery (if you had spinal anesthesia).  Your doctor is:                     Amelia Watkins PA-C    Or dial [phone] and ask for the resident on call for:  BMT  For emergency care, call the:  [facility]:  [phone] (TTY for hearing impaired: [phone])                Pending Results     Date and Time Order Name Status Description    10/2/2017 0835 Cytology non gyn In process             Admission Information     Date & Time Provider Department Dept. Phone    10/3/2017 Amelia Watkins PA-C [facility] [phone]      Your Vitals Were     Blood Pressure Temperature Respirations Height Weight Pulse Oximetry    120/71 98  F (36.7  C) (Oral) 16 1.651 m (5' 5\") 87.7 kg (193 lb 4.8 oz) 98%    BMI (Body Mass Index)                   32.17 kg/m2           [employer] Information     [employer] gives you secure access to your electronic health record. If you see a primary care provider, you can also send messages to your care team and make appointments. If you have questions, please call your primary care clinic.  If you do not have a primary care provider, please call [phone] and they will assist you.      [employer] is an electronic gateway that provides easy, online access to your medical records. With [employer], you can request a clinic appointment, read your test results, renew a prescription or communicate with your care team.     To access your existing account, please contact your [facility] or call [phone] for assistance.        Care EveryWhere ID     This is your Care EveryWhere ID. This could be used by other organizations to access your [facility] medical records  [id number]        Equal Access to Services     RODRIGO ZAMORA : Kevin Venegas, girish ge, timmy lynch " ranjan lamaerrol patino'aan ah. So [facility] [phone].    ATENCIÓN: Si gladysla veda, tiene a stiles disposición servicios gratuitos de asistencia lingüística. Oneil al [phone].    We comply with applicable federal civil rights laws and [place] laws. We do not discriminate on the basis of race, color, national origin, age, disability, sex, sexual orientation, or gender identity.               Review of your medicines      UNREVIEWED medicines. Ask your doctor about these medicines        Dose / Directions    ACAI PO   Used for:  Breast cancer, unspecified laterality, Thyroid cancer (H), Chronic arthralgias of knees and hips, unspecified laterality        Dose:  1000 mg   Take 1,000 mg by mouth 2 times daily   Refills:  0       aluminum chloride 20 % external solution   Commonly known as:  DRYSOL   Used for:  Rash, Intertrigo        Apply topically At Bedtime   Quantity:  60 mL   Refills:  3       AZMACORT IN        Dose:  2 puff   Inhale 2 puffs into the lungs as needed   Refills:  0       BENADRYL PO        Dose:  50 mg   Take 50 mg by mouth as needed   Refills:  0       BETAINE HCL PO        Dose:  2 tablet   Take 2 tablets by mouth as needed Betaine HCL and Pepsin: Take 1-7 tabs by mouth daily, if burning take one tablet less once daily. Betaine HCL 1.04 g Pepsin 40 mg   Refills:  0       calcitRIOL 0.25 MCG capsule   Commonly known as:  ROCALTROL   Used for:  Postsurgical hypothyroidism, Papillary carcinoma, follicular variant (H), Metastasis to cervical lymph node (H)        2 tabs in am and 1 tab qhs   Quantity:  270 capsule   Refills:  3       CALCIUM CITRATE +D 315-250 MG-UNIT Tabs per tablet   Generic drug:  calcium citrate-vitamin D        Dose:  2 tablet   Take 2 tablets by mouth 3 times daily   Quantity:  120 tablet   Refills:  0       celecoxib 200 MG capsule   Commonly known as:  celeBREX   Used for:  Chest wall pain        Dose:  200 mg   Take 1 capsule (200 mg) by mouth 2  times daily   Quantity:  180 capsule   Refills:  0       chlorhexidine 0.12 % solution   Commonly known as:  PERIDEX        Refills:  0       Colchicine 0.6 MG Caps   Used for:  Costal chondritis        Dose:  0.6 mg   Take 0.6 mg by mouth 3 times daily for costochondritis (chest) discomfort. Scale back use to prn with loose stools or bloating.   Quantity:  270 capsule   Refills:  1       COMPOUNDED NON-CONTROLLED SUBSTANCE - PHARMACY TO MIX COMPOUNDED MEDICATION   Commonly known as:  CMPD RX   Used for:  Neoplasm related pain        Apply small amount to affected area two times daily.Ketamine 6% + ketoprofen 10% + lidocaine 10% in Lipo.   Quantity:  120 g   Refills:  6       cromolyn 4 % ophthalmic solution   Commonly known as:  OPTICROM   Used for:  Idiopathic mast cell activation syndrome (H)        Dose:  1 drop   Place 1 drop into both eyes 4 times daily   Quantity:  10 mL   Refills:  5       cyclobenzaprine 10 MG tablet   Commonly known as:  FLEXERIL        Dose:  10 mg   Take 10 mg by mouth 3 times daily as needed On hold Dr Monsalve   Refills:  5       dexamethasone 4 MG tablet   Commonly known as:  DECADRON   Used for:  High grade B-cell lymphoma (H)        Dose:  4 mg   Take 1 tablet (4 mg) by mouth 2 times daily (with meals) Or as directed by MD   Quantity:  60 tablet   Refills:  0       diazepam 5 MG tablet   Commonly known as:  VALIUM   Used for:  Chest wall pain        Dose:  5 mg   Take 1 tablet (5 mg) by mouth 3 times daily as needed For muscle spasms   Quantity:  90 tablet   Refills:  2       diclofenac 1 % Gel topical gel   Commonly known as:  VOLTAREN   Used for:  Myofascial pain        Apply affected area two times daily PRN using enclosed dosing card.   Quantity:  100 g   Refills:  1       docusate sodium 100 MG tablet   Commonly known as:  COLACE   Used for:  Acute constipation        Dose:  100 mg   Take 100 mg by mouth daily   Quantity:  60 tablet   Refills:  1       EPINEPHrine 0.3 MG/0.3ML  injection 2-pack   Commonly known as:  EPIPEN 2-CARIDAD        Dose:  0.3 mg   Inject 0.3 mLs (0.3 mg) into the muscle once as needed for anaphylaxis   Quantity:  0.6 mL   Refills:  3       hydrochlorothiazide 12.5 MG capsule   Commonly known as:  MICROZIDE   Used for:  Hypocalcemia        Dose:  12.5 mg   Take 1 capsule (12.5 mg) by mouth daily   Quantity:  90 capsule   Refills:  2       levothyroxine 112 MCG tablet   Commonly known as:  SYNTHROID/LEVOTHROID   Used for:  Postsurgical hypothyroidism, Papillary carcinoma, follicular variant (H), Postsurgical hypoparathyroidism (H), Thyroid cancer (H)        Mon-Sat: (2 tabs daily) Sunday: 3 tabs   Quantity:  189 tablet   Refills:  3       lidocaine 5 % ointment   Commonly known as:  XYLOCAINE   Used for:  Chest wall pain        SANJANA TOPICALLY QID PRN   Quantity:  35.44 g   Refills:  3       Lidocaine HCl Monohydrate Powd        Refills:  0       lidocaine-prilocaine cream   Commonly known as:  EMLA   Used for:  Neoplasm related pain, Chest wall pain        Apply topically as needed (port access pain.)   Quantity:  30 g   Refills:  1       MAGNESIUM CITRATE PO   Indication:  up to 3 times daily        Dose:  400 mg   Take 400 mg by mouth daily   Refills:  0       methocarbamol 750 MG tablet   Commonly known as:  ROBAXIN   Used for:  Myofascial pain        Dose:  750 mg   Take 1 tablet (750 mg) by mouth 4 times daily as needed . Ok to take a 5th Robaxin on very severe days.   Quantity:  360 tablet   Refills:  1       mometasone 110 MCG/INH inhaler   Commonly known as:  ASMANEX 30 METERED DOSES   Used for:  Intermittent asthma, uncomplicated        Dose:  1 puff   Inhale 1 puff into the lungs daily   Quantity:  3 Inhaler   Refills:  3       montelukast 10 MG tablet   Commonly known as:  SINGULAIR   Used for:  Idiopathic mast cell activation syndrome (H)        TAKE TWO TABLETS BY MOUTH TWICE DAILY   Quantity:  120 tablet   Refills:  11       morphine 30 MG 12 hr tablet    Commonly known as:  MS CONTIN   Used for:  Neoplasm related pain        Dose:  30 mg   Take 1 tablet (30 mg) by mouth every 8 hours . Take an addition pill at night such that your evening dose is 60mg   Quantity:  120 tablet   Refills:  0       NASALCROM 5.2 MG/ACT Aers Inhaler   Used for:  Mast cell disease, systemic   Generic drug:  cromolyn sodium        SPRAY ONE SPRAY( 1 ML) IN NOSTRIL DAILY   Quantity:  1 Bottle   Refills:  6       OMEGA 3 PO        Dose:  5 mL   Take 5 mLs by mouth   Refills:  0       ondansetron 4 MG ODT tab   Commonly known as:  ZOFRAN ODT   Used for:  Nausea with vomiting        Dose:  4-8 mg   Take 1-2 tablets (4-8 mg) by mouth every 8 hours as needed for nausea or vomiting   Quantity:  60 tablet   Refills:  6       oxyCODONE 10 MG IR tablet   Commonly known as:  ROXICODONE   Used for:  Neoplasm related pain        Dose:  15-20 mg   Take 1.5-2 tablets (15-20 mg) by mouth every 4 hours as needed   Quantity:  180 tablet   Refills:  0       polyethylene glycol powder   Commonly known as:  MIRALAX   Used for:  Acute constipation        Dose:  1 capful   Take 17 g by mouth daily   Quantity:  510 g   Refills:  1       potassium chloride SA 20 MEQ CR tablet   Commonly known as:  KLOR-CON   Used for:  Hypokalemia        Dose:  20 mEq   Take 1 tablet (20 mEq) by mouth daily   Quantity:  90 tablet   Refills:  3       PROBIOTIC DAILY PO   Used for:  Breast cancer, unspecified laterality, Thyroid cancer (H), Chronic arthralgias of knees and hips, unspecified laterality        Dose:  1 capsule   Take 1 capsule by mouth daily Lacto acid bifidobacterium   Refills:  0       ranitidine 75 MG tablet   Commonly known as:  ZANTAC   Used for:  Mast cell disease, systemic        Dose:  75 mg   Take 1 tablet (75 mg) by mouth 3 times daily   Quantity:  270 tablet   Refills:  3       SUMAtriptan 50 MG tablet   Commonly known as:  IMITREX   Used for:  Migraine without status migrainosus, not intractable,  unspecified migraine type        Dose:  50 mg   Take 1 tablet (50 mg) by mouth at onset of headache for migraine (may repeat in 2 hours as needed, max 2 tablets daily)   Quantity:  5 tablet   Refills:  3       tacrolimus 0.1 % ointment   Commonly known as:  PROTOPIC   Used for:  Rash, Intertrigo        Apply topically 2 times daily   Quantity:  60 g   Refills:  3       tamoxifen 20 MG tablet   Commonly known as:  NOLVADEX   Used for:  Malignant neoplasm of female breast, unspecified laterality, unspecified site of breast        Dose:  20 mg   Take 1 tablet (20 mg) by mouth daily   Quantity:  90 tablet   Refills:  3       triamcinolone 0.025 % ointment   Commonly known as:  KENALOG   Used for:  Intertrigo, Rash        Apply topically 2 times daily Mix with 1 lb jar of vaseline or aquaphor   Quantity:  80 g   Refills:  3       TUMS 500 MG chewable tablet   Generic drug:  calcium carbonate        Dose:  1.5 chew tab   Take 2 tablets (1,000 mg) by mouth nightly as needed for other (cramps)   Quantity:  180 chew tab   Refills:  3       * UNABLE TO FIND        Dose:  1 tablet   1 tablet 3 times daily MEDICATION NAME: calcium D-Glucarate   Refills:  0       * UNABLE TO FIND   Indication:  On hold for drug testing   Used for:  Breast cancer, unspecified laterality, Papillary carcinoma, follicular variant (H), Chronic musculoskeletal pain        Dose:  2 tablet   2 tablets 3 times daily MEDICATION NAME: Natural D-Hist   Refills:  0       * UNABLE TO FIND        MEDICATION NAME: Tumeric 3 capsules daily   Refills:  0       * UNABLE TO FIND   Used for:  Thyroid cancer (H), Postsurgical hypothyroidism, Postsurgical hypoparathyroidism (H)        Dose:  1 tablet   1 tablet 3 times daily MEDICATION NAME: Digestzymes   Refills:  0       * UNABLE TO FIND   Indication:  [id number]   Used for:  Thyroid cancer (H), Postsurgical hypothyroidism, Postsurgical hypoparathyroidism (H)        Dose:  1 tablet   1 tablet daily MEDICATION NAME: Pure  Encapsulations   Refills:  0       VENTOLIN  (90 BASE) MCG/ACT Inhaler   Used for:  Mild intermittent asthma without complication   Generic drug:  albuterol        INHALE 2 PUFFS INTO THE LUNGS EVERY 4 HOURS AS NEEDED FOR SHORTNESS OF BREATH OR DIFFICULT BREATHING OR WHEEZING   Quantity:  18 g   Refills:  3       vitamin D3 2000 UNITS Caps   Used for:  Thyroid cancer (H), Postsurgical hypothyroidism, Papillary carcinoma, follicular variant (H), Postsurgical hypoparathyroidism (H)        Dose:  99500 Units   Take 10,000 Units by mouth daily   Quantity:  60 capsule   Refills:  4       ZYRTEC ALLERGY 10 MG tablet   Generic drug:  cetirizine        Dose:  10 mg   Take 1 tablet (10 mg) by mouth 3 times daily On hold for lab test.   Quantity:  90 tablet   Refills:  6       * Notice:  This list has 5 medication(s) that are the same as other medications prescribed for you. Read the directions carefully, and ask your doctor or other care provider to review them with you.      CONTINUE these medicines which have NOT CHANGED        Dose / Directions    * order for DME   Used for:  Venous stasis        Equipment being ordered: compression stockings. 20-30 mm or 30 - 40 mm as patient can tolerate. Physical therapy to determine if they should be above or below the knee.   Quantity:  2 Units   Refills:  4       * order for DME   Used for:  Malignant neoplasm of right female breast, unspecified site of breast        Equipment being ordered: compression bra   Quantity:  2 Device   Refills:  1       * Notice:  This list has 2 medication(s) that are the same as other medications prescribed for you. Read the directions carefully, and ask your doctor or other care provider to review them with you.             Protect others around you: Learn how to safely use, store and throw away your medicines at www.disposemymeds.org.             Medication List: This is a list of all your medications and when to take them. Check marks below  indicate your daily home schedule. Keep this list as a reference.      Medications           Morning Afternoon Evening Bedtime As Needed    ACAI PO   Take 1,000 mg by mouth 2 times daily                                aluminum chloride 20 % external solution   Commonly known as:  DRYSOL   Apply topically At Bedtime                                AZMACORT IN   Inhale 2 puffs into the lungs as needed                                BENADRYL PO   Take 50 mg by mouth as needed                                BETAINE HCL PO   Take 2 tablets by mouth as needed Betaine HCL and Pepsin: Take 1-7 tabs by mouth daily, if burning take one tablet less once daily. Betaine HCL 1.04 g Pepsin 40 mg                                calcitRIOL 0.25 MCG capsule   Commonly known as:  ROCALTROL   2 tabs in am and 1 tab qhs                                CALCIUM CITRATE +D 315-250 MG-UNIT Tabs per tablet   Take 2 tablets by mouth 3 times daily   Generic drug:  calcium citrate-vitamin D                                celecoxib 200 MG capsule   Commonly known as:  celeBREX   Take 1 capsule (200 mg) by mouth 2 times daily                                chlorhexidine 0.12 % solution   Commonly known as:  PERIDEX                                Colchicine 0.6 MG Caps   Take 0.6 mg by mouth 3 times daily for costochondritis (chest) discomfort. Scale back use to prn with loose stools or bloating.                                COMPOUNDED NON-CONTROLLED SUBSTANCE - PHARMACY TO MIX COMPOUNDED MEDICATION   Commonly known as:  CMPD RX   Apply small amount to affected area two times daily.Ketamine 6% + ketoprofen 10% + lidocaine 10% in Lipo.                                cromolyn 4 % ophthalmic solution   Commonly known as:  OPTICROM   Place 1 drop into both eyes 4 times daily                                cyclobenzaprine 10 MG tablet   Commonly known as:  FLEXERIL   Take 10 mg by mouth 3 times daily as needed On hold Dr Monsalve                                 dexamethasone 4 MG tablet   Commonly known as:  DECADRON   Take 1 tablet (4 mg) by mouth 2 times daily (with meals) Or as directed by MD                                diazepam 5 MG tablet   Commonly known as:  VALIUM   Take 1 tablet (5 mg) by mouth 3 times daily as needed For muscle spasms                                diclofenac 1 % Gel topical gel   Commonly known as:  VOLTAREN   Apply affected area two times daily PRN using enclosed dosing card.                                docusate sodium 100 MG tablet   Commonly known as:  COLACE   Take 100 mg by mouth daily                                EPINEPHrine 0.3 MG/0.3ML injection 2-pack   Commonly known as:  EPIPEN 2-CARIDAD   Inject 0.3 mLs (0.3 mg) into the muscle once as needed for anaphylaxis                                hydrochlorothiazide 12.5 MG capsule   Commonly known as:  MICROZIDE   Take 1 capsule (12.5 mg) by mouth daily                                levothyroxine 112 MCG tablet   Commonly known as:  SYNTHROID/LEVOTHROID   Mon-Sat: (2 tabs daily) Sunday: 3 tabs                                lidocaine 5 % ointment   Commonly known as:  XYLOCAINE   SANJANA TOPICALLY QID PRN                                Lidocaine HCl Monohydrate Powd                                lidocaine-prilocaine cream   Commonly known as:  EMLA   Apply topically as needed (port access pain.)                                MAGNESIUM CITRATE PO   Take 400 mg by mouth daily                                methocarbamol 750 MG tablet   Commonly known as:  ROBAXIN   Take 1 tablet (750 mg) by mouth 4 times daily as needed . Ok to take a 5th Robaxin on very severe days.                                mometasone 110 MCG/INH inhaler   Commonly known as:  ASMANEX 30 METERED DOSES   Inhale 1 puff into the lungs daily                                montelukast 10 MG tablet   Commonly known as:  SINGULAIR   TAKE TWO TABLETS BY MOUTH TWICE DAILY                                morphine 30 MG 12  hr tablet   Commonly known as:  MS CONTIN   Take 1 tablet (30 mg) by mouth every 8 hours . Take an addition pill at night such that your evening dose is 60mg                                NASALCROM 5.2 MG/ACT Aers Inhaler   SPRAY ONE SPRAY( 1 ML) IN NOSTRIL DAILY   Generic drug:  cromolyn sodium                                OMEGA 3 PO   Take 5 mLs by mouth                                ondansetron 4 MG ODT tab   Commonly known as:  ZOFRAN ODT   Take 1-2 tablets (4-8 mg) by mouth every 8 hours as needed for nausea or vomiting                                * order for DME   Equipment being ordered: compression stockings. 20-30 mm or 30 - 40 mm as patient can tolerate. Physical therapy to determine if they should be above or below the knee.                                * order for DME   Equipment being ordered: compression bra                                oxyCODONE 10 MG IR tablet   Commonly known as:  ROXICODONE   Take 1.5-2 tablets (15-20 mg) by mouth every 4 hours as needed                                polyethylene glycol powder   Commonly known as:  MIRALAX   Take 17 g by mouth daily                                potassium chloride SA 20 MEQ CR tablet   Commonly known as:  KLOR-CON   Take 1 tablet (20 mEq) by mouth daily                                PROBIOTIC DAILY PO   Take 1 capsule by mouth daily Lacto acid bifidobacterium                                ranitidine 75 MG tablet   Commonly known as:  ZANTAC   Take 1 tablet (75 mg) by mouth 3 times daily                                SUMAtriptan 50 MG tablet   Commonly known as:  IMITREX   Take 1 tablet (50 mg) by mouth at onset of headache for migraine (may repeat in 2 hours as needed, max 2 tablets daily)                                tacrolimus 0.1 % ointment   Commonly known as:  PROTOPIC   Apply topically 2 times daily                                tamoxifen 20 MG tablet   Commonly known as:  NOLVADEX   Take 1 tablet (20 mg) by mouth daily                                 triamcinolone 0.025 % ointment   Commonly known as:  KENALOG   Apply topically 2 times daily Mix with 1 lb jar of vaseline or aquaphor                                TUMS 500 MG chewable tablet   Take 2 tablets (1,000 mg) by mouth nightly as needed for other (cramps)   Generic drug:  calcium carbonate                                * UNABLE TO FIND   1 tablet 3 times daily MEDICATION NAME: calcium D-Glucarate                                * UNABLE TO FIND   2 tablets 3 times daily MEDICATION NAME: Natural D-Hist                                * UNABLE TO FIND   MEDICATION NAME: Tumeric 3 capsules daily                                * UNABLE TO FIND   1 tablet 3 times daily MEDICATION NAME: Digestzymes                                * UNABLE TO FIND   1 tablet daily MEDICATION NAME: Pure Encapsulations                                VENTOLIN  (90 BASE) MCG/ACT Inhaler   INHALE 2 PUFFS INTO THE LUNGS EVERY 4 HOURS AS NEEDED FOR SHORTNESS OF BREATH OR DIFFICULT BREATHING OR WHEEZING   Generic drug:  albuterol                                vitamin D3 2000 UNITS Caps   Take 10,000 Units by mouth daily                                ZYRTEC ALLERGY 10 MG tablet   Take 1 tablet (10 mg) by mouth 3 times daily On hold for lab test.   Generic drug:  cetirizine                                * Notice:  This list has 7 medication(s) that are the same as other medications prescribed for you. Read the directions carefully, and ask your doctor or other care provider to review them with you.

## 2017-10-04 ENCOUNTER — TELEPHONE (OUTPATIENT)
Dept: PALLIATIVE CARE | Facility: CLINIC | Age: 57
End: 2017-10-04

## 2017-10-04 ENCOUNTER — SURGERY (OUTPATIENT)
Age: 57
End: 2017-10-04

## 2017-10-04 ENCOUNTER — RESULTS ONLY (OUTPATIENT)
Dept: LAB | Age: 57
End: 2017-10-04

## 2017-10-04 ENCOUNTER — ANESTHESIA (OUTPATIENT)
Dept: SURGERY | Facility: AMBULATORY SURGERY CENTER | Age: 57
End: 2017-10-04

## 2017-10-04 ENCOUNTER — HOSPITAL ENCOUNTER (OUTPATIENT)
Facility: AMBULATORY SURGERY CENTER | Age: 57
End: 2017-10-04
Attending: PHYSICIAN ASSISTANT

## 2017-10-04 ENCOUNTER — HOSPITAL ENCOUNTER (OUTPATIENT)
Dept: MRI IMAGING | Facility: CLINIC | Age: 57
Discharge: HOME | End: 2017-10-04
Attending: INTERNAL MEDICINE | Admitting: INTERNAL MEDICINE
Payer: COMMERCIAL

## 2017-10-04 VITALS
BODY MASS INDEX: 32.22 KG/M2 | OXYGEN SATURATION: 98 % | DIASTOLIC BLOOD PRESSURE: 70 MMHG | HEIGHT: 65 IN | RESPIRATION RATE: 16 BRPM | TEMPERATURE: 97.8 F | SYSTOLIC BLOOD PRESSURE: 120 MMHG | WEIGHT: 193.4 LBS

## 2017-10-04 DIAGNOSIS — C83.38 DIFFUSE LARGE B-CELL LYMPHOMA OF LYMPH NODES OF MULTIPLE REGIONS (H): Primary | ICD-10-CM

## 2017-10-04 DIAGNOSIS — R11.2 NAUSEA WITH VOMITING: ICD-10-CM

## 2017-10-04 DIAGNOSIS — G89.3 NEOPLASM RELATED PAIN: ICD-10-CM

## 2017-10-04 DIAGNOSIS — R11.2 NAUSEA WITH VOMITING: Primary | ICD-10-CM

## 2017-10-04 DIAGNOSIS — Z00.6 EXAMINATION OF PARTICIPANT IN CLINICAL TRIAL: ICD-10-CM

## 2017-10-04 LAB
COPATH REPORT: NORMAL
COPATH REPORT: NORMAL
HCT VFR BLD AUTO: 35 % (ref 35–47)
TROPONIN I SERPL-MCNC: <0.015 UG/L (ref 0–0.04)

## 2017-10-04 PROCEDURE — 75561 CARDIAC MRI FOR MORPH W/DYE: CPT | Mod: 26 | Performed by: INTERNAL MEDICINE

## 2017-10-04 PROCEDURE — A9585 GADOBUTROL INJECTION: HCPCS | Performed by: INTERNAL MEDICINE

## 2017-10-04 PROCEDURE — 25000128 H RX IP 250 OP 636: Performed by: INTERNAL MEDICINE

## 2017-10-04 PROCEDURE — 75561 CARDIAC MRI FOR MORPH W/DYE: CPT

## 2017-10-04 DEVICE — CATH PORT POWERPORT CLEARVUE SLIM 6FR 5616000
Type: IMPLANTABLE DEVICE | Site: CHEST  WALL | Status: NON-FUNCTIONAL
Removed: 2019-11-11

## 2017-10-04 RX ORDER — LIDOCAINE HYDROCHLORIDE 20 MG/ML
INJECTION, SOLUTION INFILTRATION; PERINEURAL PRN
Status: DISCONTINUED | OUTPATIENT
Start: 2017-10-04 | End: 2017-10-04

## 2017-10-04 RX ORDER — ONDANSETRON 2 MG/ML
INJECTION INTRAMUSCULAR; INTRAVENOUS PRN
Status: DISCONTINUED | OUTPATIENT
Start: 2017-10-04 | End: 2017-10-04

## 2017-10-04 RX ORDER — NALOXONE HYDROCHLORIDE 0.4 MG/ML
.1-.4 INJECTION, SOLUTION INTRAMUSCULAR; INTRAVENOUS; SUBCUTANEOUS
Status: DISCONTINUED | OUTPATIENT
Start: 2017-10-04 | End: 2017-10-05 | Stop reason: HOSPADM

## 2017-10-04 RX ORDER — LIDOCAINE 40 MG/G
CREAM TOPICAL
Status: DISCONTINUED | OUTPATIENT
Start: 2017-10-04 | End: 2017-10-05 | Stop reason: HOSPADM

## 2017-10-04 RX ORDER — ONDANSETRON 2 MG/ML
4 INJECTION INTRAMUSCULAR; INTRAVENOUS EVERY 30 MIN PRN
Status: DISCONTINUED | OUTPATIENT
Start: 2017-10-04 | End: 2017-10-05 | Stop reason: HOSPADM

## 2017-10-04 RX ORDER — PROPOFOL 10 MG/ML
INJECTION, EMULSION INTRAVENOUS PRN
Status: DISCONTINUED | OUTPATIENT
Start: 2017-10-04 | End: 2017-10-04

## 2017-10-04 RX ORDER — SODIUM CHLORIDE 9 MG/ML
INJECTION, SOLUTION INTRAVENOUS CONTINUOUS
Status: DISCONTINUED | OUTPATIENT
Start: 2017-10-04 | End: 2017-10-05 | Stop reason: HOSPADM

## 2017-10-04 RX ORDER — SODIUM CHLORIDE, SODIUM LACTATE, POTASSIUM CHLORIDE, CALCIUM CHLORIDE 600; 310; 30; 20 MG/100ML; MG/100ML; MG/100ML; MG/100ML
INJECTION, SOLUTION INTRAVENOUS CONTINUOUS
Status: DISCONTINUED | OUTPATIENT
Start: 2017-10-04 | End: 2017-10-05 | Stop reason: HOSPADM

## 2017-10-04 RX ORDER — HEPARIN SODIUM,PORCINE 10 UNIT/ML
5 VIAL (ML) INTRAVENOUS EVERY 24 HOURS
Status: DISCONTINUED | OUTPATIENT
Start: 2017-10-04 | End: 2017-10-05 | Stop reason: HOSPADM

## 2017-10-04 RX ORDER — KETOROLAC TROMETHAMINE 30 MG/ML
30 INJECTION, SOLUTION INTRAMUSCULAR; INTRAVENOUS EVERY 6 HOURS PRN
Status: DISCONTINUED | OUTPATIENT
Start: 2017-10-04 | End: 2017-10-05 | Stop reason: HOSPADM

## 2017-10-04 RX ORDER — ONDANSETRON 4 MG/1
4 TABLET, ORALLY DISINTEGRATING ORAL EVERY 30 MIN PRN
Status: DISCONTINUED | OUTPATIENT
Start: 2017-10-04 | End: 2017-10-05 | Stop reason: HOSPADM

## 2017-10-04 RX ORDER — ONDANSETRON 8 MG/1
8 TABLET, ORALLY DISINTEGRATING ORAL EVERY 8 HOURS PRN
Qty: 90 TABLET | Refills: 3 | Status: ON HOLD | OUTPATIENT
Start: 2017-10-04 | End: 2017-11-20

## 2017-10-04 RX ORDER — GADOBUTROL 604.72 MG/ML
10 INJECTION INTRAVENOUS ONCE
Status: COMPLETED | OUTPATIENT
Start: 2017-10-04 | End: 2017-10-04

## 2017-10-04 RX ORDER — MEPERIDINE HYDROCHLORIDE 25 MG/ML
12.5 INJECTION INTRAMUSCULAR; INTRAVENOUS; SUBCUTANEOUS
Status: DISCONTINUED | OUTPATIENT
Start: 2017-10-04 | End: 2017-10-05 | Stop reason: HOSPADM

## 2017-10-04 RX ORDER — HEPARIN SODIUM (PORCINE) LOCK FLUSH IV SOLN 100 UNIT/ML 100 UNIT/ML
5 SOLUTION INTRAVENOUS
Status: DISCONTINUED | OUTPATIENT
Start: 2017-10-04 | End: 2017-10-05 | Stop reason: HOSPADM

## 2017-10-04 RX ORDER — FENTANYL CITRATE 50 UG/ML
25-50 INJECTION, SOLUTION INTRAMUSCULAR; INTRAVENOUS EVERY 5 MIN PRN
Status: DISCONTINUED | OUTPATIENT
Start: 2017-10-04 | End: 2017-10-05 | Stop reason: HOSPADM

## 2017-10-04 RX ORDER — SODIUM CHLORIDE, SODIUM LACTATE, POTASSIUM CHLORIDE, CALCIUM CHLORIDE 600; 310; 30; 20 MG/100ML; MG/100ML; MG/100ML; MG/100ML
INJECTION, SOLUTION INTRAVENOUS CONTINUOUS PRN
Status: DISCONTINUED | OUTPATIENT
Start: 2017-10-04 | End: 2017-10-04

## 2017-10-04 RX ORDER — PROPOFOL 10 MG/ML
INJECTION, EMULSION INTRAVENOUS CONTINUOUS PRN
Status: DISCONTINUED | OUTPATIENT
Start: 2017-10-04 | End: 2017-10-04

## 2017-10-04 RX ORDER — ACETAMINOPHEN 325 MG/1
975 TABLET ORAL ONCE
Status: COMPLETED | OUTPATIENT
Start: 2017-10-04 | End: 2017-10-04

## 2017-10-04 RX ORDER — ONDANSETRON 4 MG/1
4-8 TABLET, ORALLY DISINTEGRATING ORAL EVERY 8 HOURS PRN
Qty: 60 TABLET | Refills: 6 | Status: SHIPPED | OUTPATIENT
Start: 2017-10-04 | End: 2017-10-04

## 2017-10-04 RX ADMIN — ONDANSETRON 4 MG: 2 INJECTION INTRAMUSCULAR; INTRAVENOUS at 09:05

## 2017-10-04 RX ADMIN — ONDANSETRON 4 MG: 2 INJECTION INTRAMUSCULAR; INTRAVENOUS at 09:33

## 2017-10-04 RX ADMIN — GADOBUTROL 10 ML: 604.72 INJECTION INTRAVENOUS at 15:07

## 2017-10-04 RX ADMIN — PROPOFOL 200 MCG/KG/MIN: 10 INJECTION, EMULSION INTRAVENOUS at 08:14

## 2017-10-04 RX ADMIN — SODIUM CHLORIDE, SODIUM LACTATE, POTASSIUM CHLORIDE, CALCIUM CHLORIDE: 600; 310; 30; 20 INJECTION, SOLUTION INTRAVENOUS at 08:14

## 2017-10-04 RX ADMIN — SODIUM CHLORIDE: 9 INJECTION, SOLUTION INTRAVENOUS at 07:08

## 2017-10-04 RX ADMIN — LIDOCAINE HYDROCHLORIDE 80 MG: 20 INJECTION, SOLUTION INFILTRATION; PERINEURAL at 08:14

## 2017-10-04 RX ADMIN — Medication 20 ML: at 08:49

## 2017-10-04 RX ADMIN — PROPOFOL 100 MG: 10 INJECTION, EMULSION INTRAVENOUS at 08:14

## 2017-10-04 RX ADMIN — ACETAMINOPHEN 975 MG: 325 TABLET ORAL at 07:07

## 2017-10-04 NOTE — TELEPHONE ENCOUNTER
Patient stopped in clinic and requesting refill of zofran - spoke with MD who okay'd refill.     Last refill: 4/26/2017  Last office visit: 9/27/2017  Needs to schedule follow up -- will try to coordinate with appts already scheduled.    Script sent to [facility].     [place]  Report reviewed.

## 2017-10-04 NOTE — IP AVS SNAPSHOT
[facility]    [address] [place] [id number]    Phone:  [phone]    Fax:  [phone]                                       After Visit Summary   10/4/2017    Tisha Arias    MRN: [id number]           After Visit Summary Signature Page     I have received my discharge instructions, and my questions have been answered. I have discussed any challenges I see with this plan with the nurse or doctor.    ..........................................................................................................................................  Patient/Patient Representative Signature      ..........................................................................................................................................  Patient Representative Print Name and Relationship to Patient    ..................................................               ................................................  Date                                            Time    ..........................................................................................................................................  Reviewed by Signature/Title    ...................................................              ..............................................  Date                                                            Time

## 2017-10-04 NOTE — PROCEDURES
Interventional Radiology Brief Post Procedure Note    Procedure:  IR CHEST PORT PLACEMENT > 5 YRS OF AGE [TAL1015]    Proceduralist: TRACI Pastrana PA-C    Assistant: None    Time Out: Prior to the start of the procedure and with procedural staff participation, I verbally confirmed the patient s identity using two indicators, relevant allergies, that the procedure was appropriate and matched the consent or emergent situation, and that the correct equipment/implants were available. Immediately prior to starting the procedure I conducted the Time Out with the procedural staff and re-confirmed the patient s name, procedure, and site/side. (The Joint Commission universal protocol was followed.)  Yes        Sedation: Monitored Anesthesia Care (MAC) administered and documented by Anesthesia Care Provider    Findings: Completed placement of a 6 Greek, 20.5 cm, Bard ClearVUE Slim brand, single lumen venous chest power-injectable port via RIJV. Tip lying in the right atrium. PORT is ready for immediate use. Dx: Diffuse large B-cell lymphoma of intrathoracic lymph nodes. Julio C MAC 0.4    Estimated Blood Loss: Less than 10 ml    Fluoroscopy Time:  minute(s)    SPECIMENS: Fluid and/or tissue for Gram stain and culture    Complications: 1. None     Condition: Stable    Plan:     Comments: See dictated procedure note for full details.    Jc Goodman PA-C

## 2017-10-04 NOTE — TELEPHONE ENCOUNTER
Cmpd cream script changed to ketamine 6% + lidocaine 10% in lipo due to exclusion of ketoprofen from insurance plan. Patient notified this was done. Second level appeal not pursued.

## 2017-10-04 NOTE — ANESTHESIA CARE TRANSFER NOTE
Patient: Tisha Arias    Procedure(s):  Port Placement - Wound Class: I-Clean    Diagnosis: Diffused Large B Cell Lymphoma  Diagnosis Additional Information: No value filed.    Anesthesia Type:   MAC     Note:  Airway :Room Air  Patient transferred to:Phase II  Comments: Patient awake and breathing spont. VSS. No complaints of pain or nausea. Report to RN      Vitals: (Last set prior to Anesthesia Care Transfer)    CRNA VITALS  10/4/2017 0831 - 10/4/2017 0916      10/4/2017             NIBP: 108/59    Pulse: 59    NIBP Mean: 82    SpO2: 99 %    Resp Rate (observed): (!)  1    Resp Rate (set): 10    EKG: NSR                Electronically Signed By: DENZEL Grande CRNA  October 4, 2017  9:16 AM

## 2017-10-04 NOTE — ANESTHESIA PREPROCEDURE EVALUATION
Anesthesia Evaluation     . Pt has had prior anesthetic. Type: MAC and General    No history of anesthetic complications          ROS/MED HX    ENT/Pulmonary:     (+)asthma , . .    Neurologic: Comment: neuropathy      Cardiovascular:     (+) hypertension----. : . . . :. .       METS/Exercise Tolerance:     Hematologic:         Musculoskeletal:         GI/Hepatic:         Renal/Genitourinary:         Endo:     (+) thyroid problem Obesity, .      Psychiatric:     (+) psychiatric history       Infectious Disease:         Malignancy:   (+) Malignancy           Other:                     Physical Exam  Normal systems: dental    Airway   Mallampati: II  TM distance: >3 FB  Neck ROM: full    Dental     Cardiovascular       Pulmonary                     Anesthesia Plan      History & Physical Review  History and physical reviewed and following examination; no interval change.    ASA Status:  3 .    NPO Status:  > 2 hours and > 6 hours    Plan for MAC Reason for MAC:  Deep or markedly invasive procedure (G8)         Postoperative Care      Consents  Anesthetic plan, risks, benefits and alternatives discussed with:  Patient..                          .

## 2017-10-04 NOTE — DISCHARGE INSTRUCTIONS
A collaboration between [facility] and [facility]  Experts in minimally invasive, targeted treatments performed using imaging guidance    Venous Access Device,  Port Catheter or Tunneled Central Line Placement    Today you had a procedure today to install a venous access device; either a tunneled central vein catheter or a subcutaneous port catheter.    One of our Radiology PAs performed this procedure for you today:  ? Alonzo aBrfield PA-C  ? TRACI Pastrana PA-C  ? Carlos Strong PA-C  ? Ely Lorenzana PA-C   ? Jose Zarate PA-C    After you go home:  - Drink plenty of fluids.  Generally 6-8 (8 ounce) glasses a day is recommended.  - Resume your regular diet unless otherwise ordered by a medical provider.  - Keep any applied tape/gauze dressings clean and dry.  Change tape/gauze dressings if they get wet or soiled.  - You may shower the following day after procedure, however cover and protect from moisture any tape/gauze dressings.  You may let water hit and run over dried skin glue, but do not scrub.  Pat the area dry after showering.  - Port placement incisions are closed with absorbable suture, meaning they do not need to be removed at a later date, and a topical skin adhesive (skin glue).  This glue will wear off in 7-14 days.  Do not remove before this time.  If 14 days have passed and residual glue is present, you may gently remove it.  - Do not apply gels, lotions, or ointments to the glue site for the first 10 days as this may cause the glue to prematurely soften and fail.  - Do not perform strenuous activities or lift greater than 10 pounds for the next three days.  - If there is bleeding or oozing from the procedure site, apply firm pressure to the area for 5-10 minutes.  If the bleeding continues seek medical advice at the numbers below.  - Mild procedure site discomfort can be treated with an ice pack and over-the-counter pain  relievers.        For 24 hours after any sedation used:  - Relax and take it easy.  No strenuous activities.  - Do not drive or operate machines at home or at work.  - No alcohol consumption.  - Do not make any important or legal decisions.    Call our Interventional Radiology (IR) service if:  - If you start bleeding from the procedure site.  If you do start to bleed from the site, lie down and hold some pressure on the site.  Our radiology provider can help you decide if you need to return to the hospital.  - If you have new or worsening pain related to the procedure.  - If you have concerning swelling at the procedure site.  - If you develop persistent nausea or vomiting.  - If you develop hives or a rash or any unexplained itching.  - If you have a fever of greater than 100.5  F and chills in the first 5 days after procedure.  - Any other concerns related to your procedure.      [facility]  Interventional Radiology (IR)  [address] [place] [address]    Contact Number:  [phone]  (IR control desk)  - Monday - Friday 8:00 am - 4:30 pm    After hours for urgent concerns:  [phone]  - After 4:30 pm Monday - Friday, Weekends and Holidays.   - Ask for Interventional Radiology on-call.  Someone is available 24 hours a day.  - [facility] toll free number:  [phone]    [facility]  Home Care Following Anesthesia  For 24 hours after surgery:  1. Get plenty of rest.  A responsible adult must stay with you for at least 24 hours after you leave the surgery center.  2. Do not drive or use heavy equipment.  If you have weakness or tingling, don't drive or use heavy equipment until this feeling goes away.   3. Do not drink alcohol.   4. Avoid strenuous or risky activities.  Ask for help when climbing stairs.  5. You may feel lightheaded.  IF so, sit for a few minutes before standing.  Have someone help you get up.  "  6. If you have nausea (feel sick to your stomach): Drink only clear liquids such as apple juice, ginger ale, broth or 7-Up.  Rest may also help.  Be sure to drink enough fluids.  Move to a regular diet as you feel able.   7. You may have a slight fever.  Call the doctor if your fever is over 100 F (37.7 C) (taken under the tongue) or lasts longer than 24 hours.  8. You may have a dry mouth, a sore throat, muscle aches or trouble sleeping. These should go away after 24 hours.  9. Do not make important or legal decisions.        Today you received a Marcaine or bupivacaine block to numb the nerves near your surgery site.  This is a block using local anesthetic or \"numbing\" medication injected around the nerves to anesthetize or \"numb\" the area supplied by those nerves.  This block is injected into the muscle layer near your surgical site.  The medication may numb the location where you had surgery for 6-18 hours, but may last up to 24 hours.  If your surgical site is an arm or leg you should be careful with your affected limb, since it is possible to injure your limb without being aware of it due to the numbing.  Until full feeling returns, you should guard against bumping or hitting your limb, and avoid extreme hot or cold temperatures on the skin.  As the block wears off, the feeling will return as a tingling or prickly sensation near your surgical site.  You will experience more discomfort from your incision as the feeling returns.  You may want to take a pain pill (a narcotic or Tylenol if this was prescribed by your surgeon) when you start to experience mild pain before the pain beccomes more severe.  If your pain medications do not control your pain you should notifiy your surgeon.    Tips for taking pain medications  To get the best pain relief possible, remember these points:    Take pain medications as directed, before pain becomes severe.    Pain medication can upset your stomach: taking it with food may " help.    Constipation is a common side effect of pain medication. Drink plenty of  fluids.    Eat foods high in fiber. Take a stool softener if recommended by your doctor or pharmacist.    Do not drink alcohol, drive or operate machinery while taking pain medications.    Ask about other ways to control pain, such as with heat, ice or relaxation.    Tylenol/Acetaminophen Consumption  To help encourage the safe use of acetaminophen, the makers of TYLENOL  have lowered the maximum daily dose for single-ingredient Extra Strength TYLENOL  (acetaminophen) products sold in the [place] from 8 pills per day (4,000 mg) to 6 pills per day (3,000 mg). The dosing interval has also changed from 2 pills every 4-6 hours to 2 pills every 6 hours.    If you feel your pain relief is insufficient, you may take Tylenol/Acetaminophen in addition to your narcotic pain medication.     Be careful not to exceed 3,000 mg of Tylenol/Acetaminophen in a 24 hour period from all sources.    If you are taking extra strength Tylenol/acetaminophen (500 mg), the maximum dose is 6 tablets in 24 hours.    If you are taking regular strength acetaminophen (325 mg), the maximum dose is 9 tablets in 24 hours.    Call a doctor for any of the followin. Signs of infection (fever, growing tenderness at the surgery site, a large amount of drainage or bleeding, severe pain, foul-smelling drainage, redness, swelling).  2. It has been over 8 to 10 hours since surgery and you are still not able to urinate (pass water).  3. Headache for over 24 hours.  Numbness, tingling or weakness the day after surgery (if you had spinal anesthesia).

## 2017-10-04 NOTE — IP AVS SNAPSHOT
MRN:[id number]                      After Visit Summary   10/4/2017    Tisha Arias    MRN: [id number]           Thank you!     Thank you for choosing [facility] for your care. Our goal is always to provide you with excellent care. Hearing back from our patients is one way we can continue to improve our services. Please take a few minutes to complete the written survey that you may receive in the mail after you visit with us. Thank you!        Patient Information     Date Of Birth          1960        About your hospital stay     You were admitted on:  October 4, 2017 [facility]    You were discharged on:  October 4, 2017       Who to Call     For medical emergencies, please call 911.  For non-urgent questions about your medical care, please call your primary care provider or clinic, [phone]  For questions related to your surgery, please call your surgery clinic        Attending Provider     Provider Specialty    Jose Zarate PA-C Physician Assistant       Primary Care Provider Office Phone # Fax #    Shahla Crawley [phone]      Your next 10 appointments already scheduled     Oct 04, 2017  1:30 PM CDT   MR MYOCARDIUM W CONTRAST with UUMR4   [facility] ([facility])    [address] [facility] [address] [phone]           Take your medicines as usual, unless your doctor tells you not to. Bring a list of your current medicines to your exam (including vitamins, minerals and over-the-counter drugs).  You will be given intravenous contrast for this exam. To prepare:   The day before your exam, drink extra fluids at least six 8-ounce glasses (unless your doctor tells you to restrict your fluids).   Have a blood test (creatinine test) within 30 days of your exam. Go to your clinic or Diagnostic Imaging Department for this  test.  The MRI machine uses a strong magnet. Please wear clothes without metal (snaps, zippers). A sweatsuit works well, or we may give you a hospital gown.  Please remove any body piercings and hair extensions before you arrive. You will also remove watches, jewelry, hairpins, wallets, dentures, partial dental plates and hearing aids. You may wear contact lenses, and you may be able to wear your rings. We have a safe place to keep your personal items, but it is safer to leave them at home.   **IMPORTANT** THE INSTRUCTIONS BELOW ARE ONLY FOR THOSE PATIENTS WHO HAVE BEEN TOLD THEY WILL RECEIVE SEDATION OR GENERAL ANESTHESIA DURING THEIR MRI PROCEDURE:  IF YOU WILL RECEIVE SEDATION (take medicine to help you relax during your exam):   You must get the medicine from your doctor before you arrive. Bring the medicine to the exam. Do not take it at home.   Arrive one hour early. Bring someone who can take you home after the test. Your medicine will make you sleepy. After the exam, you may not drive, take a bus or take a taxi by yourself.   No eating 8 hours before your exam. You may have clear liquids up until 4 hours before your exam. (Clear liquids include water, clear tea, black coffee and fruit juice without pulp.)  IF YOU WILL RECEIVE ANESTHESIA (be asleep for your exam):   Arrive 1 1/2 hours early. Bring someone who can take you home after the test. You may not drive, take a bus or take a taxi by yourself.   No eating 8 hours before your exam. You may have clear liquids up until 4 hours before your exam. (Clear liquids include water, clear tea, black coffee and fruit juice without pulp.)  Please call the Imaging Department at your exam site with any questions.            Nov 02, 2017  5:00 PM CDT   US HEAD NECK SOFT TISSUE with UCUS3   [facility]  ([facility])    [address]  1st Floor  [facility] [address] [phone]           Please bring a list of your  medicines (including vitamins, minerals and over-the-counter drugs). Also, tell your doctor about any allergies you may have. Wear comfortable clothes and leave your valuables at home.  You do not need to do anything special to prepare for your exam.  Please call the Imaging Department at your exam site with any questions.            Nov 02, 2017  6:00 PM CDT   LAB with  LAB   [facility] ([facility])    [address] [place] [id number] [phone]           Patient must bring picture ID. Patient should be prepared to give a urine specimen  Please do not eat 10-12 hours before your appointment if you are coming in fasting for labs on lipids, cholesterol, or glucose (sugar). Pregnant women should follow their Care Team instructions. Water with medications is okay. Do not drink coffee or other fluids. If you have concerns about taking  your medications, please ask at office or if scheduling via discoapit, send a message by clicking on Secure Messaging, Message Your Care Team.            Nov 13, 2017  4:30 PM CST   (Arrive by 4:15 PM)   RETURN ENDOCRINE with Avelina Castro MD   [facility] ([facility])    [address] [place] [address] [id number] [phone]            Nov 27, 2017  4:00 PM CST   (Arrive by 3:45 PM)   Return Visit with Shahla Crawley MD   [facility] ([facility])    [address] [place] [id number] [phone]              Further instructions from your care team         A collaboration between [facility] and [facility]  Experts in minimally invasive, targeted treatments performed using imaging guidance    Venous Access Device,  Port Catheter or Tunneled Central Line Placement    Today you had a procedure today to install a venous access device; either a tunneled  central vein catheter or a subcutaneous port catheter.    One of our Radiology PAs performed this procedure for you today:  ? Alonzo Barfield PA-C  ? TRACI Pastrana PA-C  ? Carlos Strong PA-C  ? Ely Lorenzana PA-C   ? Jose Zarate PA-C    After you go home:  - Drink plenty of fluids.  Generally 6-8 (8 ounce) glasses a day is recommended.  - Resume your regular diet unless otherwise ordered by a medical provider.  - Keep any applied tape/gauze dressings clean and dry.  Change tape/gauze dressings if they get wet or soiled.  - You may shower the following day after procedure, however cover and protect from moisture any tape/gauze dressings.  You may let water hit and run over dried skin glue, but do not scrub.  Pat the area dry after showering.  - Port placement incisions are closed with absorbable suture, meaning they do not need to be removed at a later date, and a topical skin adhesive (skin glue).  This glue will wear off in 7-14 days.  Do not remove before this time.  If 14 days have passed and residual glue is present, you may gently remove it.  - Do not apply gels, lotions, or ointments to the glue site for the first 10 days as this may cause the glue to prematurely soften and fail.  - Do not perform strenuous activities or lift greater than 10 pounds for the next three days.  - If there is bleeding or oozing from the procedure site, apply firm pressure to the area for 5-10 minutes.  If the bleeding continues seek medical advice at the numbers below.  - Mild procedure site discomfort can be treated with an ice pack and over-the-counter pain relievers.        For 24 hours after any sedation used:  - Relax and take it easy.  No strenuous activities.  - Do not drive or operate machines at home or at work.  - No alcohol consumption.  - Do not make any important or legal decisions.    Call our Interventional Radiology (IR) service if:  - If you start bleeding from the procedure site.  If you do start to  bleed from the site, lie down and hold some pressure on the site.  Our radiology provider can help you decide if you need to return to the hospital.  - If you have new or worsening pain related to the procedure.  - If you have concerning swelling at the procedure site.  - If you develop persistent nausea or vomiting.  - If you develop hives or a rash or any unexplained itching.  - If you have a fever of greater than 100.5  F and chills in the first 5 days after procedure.  - Any other concerns related to your procedure.      [facility]  Interventional Radiology (IR)  [address] [facility] [facility]  2nd Floor, [facility] [place] [address]    Contact Number:  [phone]  (IR control desk)  - Monday - Friday 8:00 am - 4:30 pm    After hours for urgent concerns:  [phone]  - After 4:30 pm Monday - Friday, Weekends and Holidays.   - Ask for Interventional Radiology on-call.  Someone is available 24 hours a day.  - [facility] toll free number:  [phone]    [facility]  Home Care Following Anesthesia  For 24 hours after surgery:  1. Get plenty of rest.  A responsible adult must stay with you for at least 24 hours after you leave the surgery center.  2. Do not drive or use heavy equipment.  If you have weakness or tingling, don't drive or use heavy equipment until this feeling goes away.   3. Do not drink alcohol.   4. Avoid strenuous or risky activities.  Ask for help when climbing stairs.  5. You may feel lightheaded.  IF so, sit for a few minutes before standing.  Have someone help you get up.   6. If you have nausea (feel sick to your stomach): Drink only clear liquids such as apple juice, ginger ale, broth or 7-Up.  Rest may also help.  Be sure to drink enough fluids.  Move to a regular diet as you feel able.   7. You may have a slight fever.  Call the doctor if your fever is over 100 F (37.7 C) (taken under the tongue) or lasts longer than 24  "hours.  8. You may have a dry mouth, a sore throat, muscle aches or trouble sleeping. These should go away after 24 hours.  9. Do not make important or legal decisions.        Today you received a Marcaine or bupivacaine block to numb the nerves near your surgery site.  This is a block using local anesthetic or \"numbing\" medication injected around the nerves to anesthetize or \"numb\" the area supplied by those nerves.  This block is injected into the muscle layer near your surgical site.  The medication may numb the location where you had surgery for 6-18 hours, but may last up to 24 hours.  If your surgical site is an arm or leg you should be careful with your affected limb, since it is possible to injure your limb without being aware of it due to the numbing.  Until full feeling returns, you should guard against bumping or hitting your limb, and avoid extreme hot or cold temperatures on the skin.  As the block wears off, the feeling will return as a tingling or prickly sensation near your surgical site.  You will experience more discomfort from your incision as the feeling returns.  You may want to take a pain pill (a narcotic or Tylenol if this was prescribed by your surgeon) when you start to experience mild pain before the pain beccomes more severe.  If your pain medications do not control your pain you should notifiy your surgeon.    Tips for taking pain medications  To get the best pain relief possible, remember these points:    Take pain medications as directed, before pain becomes severe.    Pain medication can upset your stomach: taking it with food may help.    Constipation is a common side effect of pain medication. Drink plenty of  fluids.    Eat foods high in fiber. Take a stool softener if recommended by your doctor or pharmacist.    Do not drink alcohol, drive or operate machinery while taking pain medications.    Ask about other ways to control pain, such as with heat, ice or " "relaxation.    Tylenol/Acetaminophen Consumption  To help encourage the safe use of acetaminophen, the makers of TYLENOL  have lowered the maximum daily dose for single-ingredient Extra Strength TYLENOL  (acetaminophen) products sold in the [place] from 8 pills per day (4,000 mg) to 6 pills per day (3,000 mg). The dosing interval has also changed from 2 pills every 4-6 hours to 2 pills every 6 hours.    If you feel your pain relief is insufficient, you may take Tylenol/Acetaminophen in addition to your narcotic pain medication.     Be careful not to exceed 3,000 mg of Tylenol/Acetaminophen in a 24 hour period from all sources.    If you are taking extra strength Tylenol/acetaminophen (500 mg), the maximum dose is 6 tablets in 24 hours.    If you are taking regular strength acetaminophen (325 mg), the maximum dose is 9 tablets in 24 hours.    Call a doctor for any of the followin. Signs of infection (fever, growing tenderness at the surgery site, a large amount of drainage or bleeding, severe pain, foul-smelling drainage, redness, swelling).  2. It has been over 8 to 10 hours since surgery and you are still not able to urinate (pass water).  3. Headache for over 24 hours.  Numbness, tingling or weakness the day after surgery (if you had spinal anesthesia).    Pending Results     Date and Time Order Name Status Description    10/4/2017 0727 IR CHEST PORT PLACEMENT > 5 YRS OF AGE In process     10/3/2017 0943 CHROMOSOME BONE MARROW In process     10/3/2017 0943 LEUKEMIA LYMPHOMA EVALUATION (FLOW CYTOMETRY) In process     10/3/2017 0943 BONE MARROW BIOPSY In process             Admission Information     Date & Time Provider Department Dept. Phone    10/4/2017 Jose Zarate PA-C [facility] [phone]      Your Vitals Were     Blood Pressure Temperature Respirations Height Weight Pulse Oximetry    142/101 99  F (37.2  C) (Oral) 16 1.651 m (5' 5\") 87.7 kg (193 lb 6.4 oz) 94%    BMI " (Body Mass Index)                   32.18 kg/m2           [employer] Information     [employer] gives you secure access to your electronic health record. If you see a primary care provider, you can also send messages to your care team and make appointments. If you have questions, please call your primary care clinic.  If you do not have a primary care provider, please call [phone] and they will assist you.      [employer] is an electronic gateway that provides easy, online access to your medical records. With [employer], you can request a clinic appointment, read your test results, renew a prescription or communicate with your care team.     To access your existing account, please contact your [facility] or call [phone] for assistance.        Care EveryWhere ID     This is your Care EveryWhere ID. This could be used by other organizations to access your [facility] medical records  [id number]        Equal Access to Services     RODRIGO ZAMORA : Kevin Venegas, girish ge, timmy lama, ranjan yoder . [facility] [phone].    ATENCIÓN: Si habla español, tiene a stiles disposición servicios gratuitos de asistencia lingüística. Llame al [phone].    We comply with applicable federal civil rights laws and [place] laws. We do not discriminate on the basis of race, color, national origin, age, disability, sex, sexual orientation, or gender identity.               Review of your medicines      UNREVIEWED medicines. Ask your doctor about these medicines        Dose / Directions    ACAI PO   Used for:  Breast cancer, unspecified laterality, Thyroid cancer (H), Chronic arthralgias of knees and hips, unspecified laterality        Dose:  1000 mg   Take 1,000 mg by mouth 2 times daily   Refills:  0       aluminum chloride 20 % external solution   Commonly known as:  DRYSOL   Used for:  Rash, Intertrigo        Apply topically At Bedtime    Quantity:  60 mL   Refills:  3       AZMACORT IN        Dose:  2 puff   Inhale 2 puffs into the lungs as needed   Refills:  0       BENADRYL PO        Dose:  50 mg   Take 50 mg by mouth as needed   Refills:  0       BETAINE HCL PO        Dose:  2 tablet   Take 2 tablets by mouth as needed Betaine HCL and Pepsin: Take 1-7 tabs by mouth daily, if burning take one tablet less once daily. Betaine HCL 1.04 g Pepsin 40 mg   Refills:  0       calcitRIOL 0.25 MCG capsule   Commonly known as:  ROCALTROL   Used for:  Postsurgical hypothyroidism, Papillary carcinoma, follicular variant (H), Metastasis to cervical lymph node (H)        2 tabs in am and 1 tab qhs   Quantity:  270 capsule   Refills:  3       CALCIUM CITRATE +D 315-250 MG-UNIT Tabs per tablet   Generic drug:  calcium citrate-vitamin D        Dose:  2 tablet   Take 2 tablets by mouth 3 times daily   Quantity:  120 tablet   Refills:  0       celecoxib 200 MG capsule   Commonly known as:  celeBREX   Used for:  Chest wall pain        Dose:  200 mg   Take 1 capsule (200 mg) by mouth 2 times daily   Quantity:  180 capsule   Refills:  0       chlorhexidine 0.12 % solution   Commonly known as:  PERIDEX        Refills:  0       Colchicine 0.6 MG Caps   Used for:  Costal chondritis        Dose:  0.6 mg   Take 0.6 mg by mouth 3 times daily for costochondritis (chest) discomfort. Scale back use to prn with loose stools or bloating.   Quantity:  270 capsule   Refills:  1       COMPOUNDED NON-CONTROLLED SUBSTANCE - PHARMACY TO MIX COMPOUNDED MEDICATION   Commonly known as:  CMPD RX   Used for:  Neoplasm related pain        Apply small amount to affected area two times daily.Ketamine 6% + ketoprofen 10% + lidocaine 10% in Lipo.   Quantity:  120 g   Refills:  6       cromolyn 4 % ophthalmic solution   Commonly known as:  OPTICROM   Used for:  Idiopathic mast cell activation syndrome (H)        Dose:  1 drop   Place 1 drop into both eyes 4 times daily   Quantity:  10 mL    Refills:  5       cyclobenzaprine 10 MG tablet   Commonly known as:  FLEXERIL        Dose:  10 mg   Take 10 mg by mouth 3 times daily as needed On hold Dr Monsalve   Refills:  5       dexamethasone 4 MG tablet   Commonly known as:  DECADRON   Used for:  High grade B-cell lymphoma (H)        Dose:  4 mg   Take 1 tablet (4 mg) by mouth 2 times daily (with meals) Or as directed by MD   Quantity:  60 tablet   Refills:  0       diazepam 5 MG tablet   Commonly known as:  VALIUM   Used for:  Chest wall pain        Dose:  5 mg   Take 1 tablet (5 mg) by mouth 3 times daily as needed For muscle spasms   Quantity:  90 tablet   Refills:  2       diclofenac 1 % Gel topical gel   Commonly known as:  VOLTAREN   Used for:  Myofascial pain        Apply affected area two times daily PRN using enclosed dosing card.   Quantity:  100 g   Refills:  1       docusate sodium 100 MG tablet   Commonly known as:  COLACE   Used for:  Acute constipation        Dose:  100 mg   Take 100 mg by mouth daily   Quantity:  60 tablet   Refills:  1       EPINEPHrine 0.3 MG/0.3ML injection 2-pack   Commonly known as:  EPIPEN 2-CARIDAD        Dose:  0.3 mg   Inject 0.3 mLs (0.3 mg) into the muscle once as needed for anaphylaxis   Quantity:  0.6 mL   Refills:  3       hydrochlorothiazide 12.5 MG capsule   Commonly known as:  MICROZIDE   Used for:  Hypocalcemia        Dose:  12.5 mg   Take 1 capsule (12.5 mg) by mouth daily   Quantity:  90 capsule   Refills:  2       levothyroxine 112 MCG tablet   Commonly known as:  SYNTHROID/LEVOTHROID   Used for:  Postsurgical hypothyroidism, Papillary carcinoma, follicular variant (H), Postsurgical hypoparathyroidism (H), Thyroid cancer (H)        Mon-Sat: (2 tabs daily) Sunday: 3 tabs   Quantity:  189 tablet   Refills:  3       lidocaine 5 % ointment   Commonly known as:  XYLOCAINE   Used for:  Chest wall pain        SANJANA TOPICALLY QID PRN   Quantity:  35.44 g   Refills:  3       Lidocaine HCl Monohydrate Powd        Refills:   0       lidocaine-prilocaine cream   Commonly known as:  EMLA   Used for:  Neoplasm related pain, Chest wall pain        Apply topically as needed (port access pain.)   Quantity:  30 g   Refills:  1       MAGNESIUM CITRATE PO   Indication:  up to 3 times daily        Dose:  400 mg   Take 400 mg by mouth daily   Refills:  0       methocarbamol 750 MG tablet   Commonly known as:  ROBAXIN   Used for:  Myofascial pain        Dose:  750 mg   Take 1 tablet (750 mg) by mouth 4 times daily as needed . Ok to take a 5th Robaxin on very severe days.   Quantity:  360 tablet   Refills:  1       mometasone 110 MCG/INH inhaler   Commonly known as:  ASMANEX 30 METERED DOSES   Used for:  Intermittent asthma, uncomplicated        Dose:  1 puff   Inhale 1 puff into the lungs daily   Quantity:  3 Inhaler   Refills:  3       montelukast 10 MG tablet   Commonly known as:  SINGULAIR   Used for:  Idiopathic mast cell activation syndrome (H)        TAKE TWO TABLETS BY MOUTH TWICE DAILY   Quantity:  120 tablet   Refills:  11       morphine 30 MG 12 hr tablet   Commonly known as:  MS CONTIN   Used for:  Neoplasm related pain        Dose:  30 mg   Take 1 tablet (30 mg) by mouth every 8 hours . Take an addition pill at night such that your evening dose is 60mg   Quantity:  120 tablet   Refills:  0       NASALCROM 5.2 MG/ACT Aers Inhaler   Used for:  Mast cell disease, systemic   Generic drug:  cromolyn sodium        SPRAY ONE SPRAY( 1 ML) IN NOSTRIL DAILY   Quantity:  1 Bottle   Refills:  6       OMEGA 3 PO        Dose:  5 mL   Take 5 mLs by mouth   Refills:  0       ondansetron 4 MG ODT tab   Commonly known as:  ZOFRAN ODT   Used for:  Nausea with vomiting        Dose:  4-8 mg   Take 1-2 tablets (4-8 mg) by mouth every 8 hours as needed for nausea or vomiting   Quantity:  60 tablet   Refills:  6       oxyCODONE 10 MG IR tablet   Commonly known as:  ROXICODONE   Used for:  Neoplasm related pain        Dose:  15-20 mg   Take 1.5-2 tablets  (15-20 mg) by mouth every 4 hours as needed   Quantity:  180 tablet   Refills:  0       polyethylene glycol powder   Commonly known as:  MIRALAX   Used for:  Acute constipation        Dose:  1 capful   Take 17 g by mouth daily   Quantity:  510 g   Refills:  1       potassium chloride SA 20 MEQ CR tablet   Commonly known as:  KLOR-CON   Used for:  Hypokalemia        Dose:  20 mEq   Take 1 tablet (20 mEq) by mouth daily   Quantity:  90 tablet   Refills:  3       PROBIOTIC DAILY PO   Used for:  Breast cancer, unspecified laterality, Thyroid cancer (H), Chronic arthralgias of knees and hips, unspecified laterality        Dose:  1 capsule   Take 1 capsule by mouth daily Lacto acid bifidobacterium   Refills:  0       ranitidine 75 MG tablet   Commonly known as:  ZANTAC   Used for:  Mast cell disease, systemic        Dose:  75 mg   Take 1 tablet (75 mg) by mouth 3 times daily   Quantity:  270 tablet   Refills:  3       SUMAtriptan 50 MG tablet   Commonly known as:  IMITREX   Used for:  Migraine without status migrainosus, not intractable, unspecified migraine type        Dose:  50 mg   Take 1 tablet (50 mg) by mouth at onset of headache for migraine (may repeat in 2 hours as needed, max 2 tablets daily)   Quantity:  5 tablet   Refills:  3       tacrolimus 0.1 % ointment   Commonly known as:  PROTOPIC   Used for:  Rash, Intertrigo        Apply topically 2 times daily   Quantity:  60 g   Refills:  3       tamoxifen 20 MG tablet   Commonly known as:  NOLVADEX   Used for:  Malignant neoplasm of female breast, unspecified laterality, unspecified site of breast        Dose:  20 mg   Take 1 tablet (20 mg) by mouth daily   Quantity:  90 tablet   Refills:  3       triamcinolone 0.025 % ointment   Commonly known as:  KENALOG   Used for:  Intertrigo, Rash        Apply topically 2 times daily Mix with 1 lb jar of vaseline or aquaphor   Quantity:  80 g   Refills:  3       TUMS 500 MG chewable tablet   Generic drug:  calcium carbonate         Dose:  1.5 chew tab   Take 2 tablets (1,000 mg) by mouth nightly as needed for other (cramps)   Quantity:  180 chew tab   Refills:  3       * UNABLE TO FIND        Dose:  1 tablet   1 tablet 3 times daily MEDICATION NAME: calcium D-Glucarate   Refills:  0       * UNABLE TO FIND   Indication:  On hold for drug testing   Used for:  Breast cancer, unspecified laterality, Papillary carcinoma, follicular variant (H), Chronic musculoskeletal pain        Dose:  2 tablet   2 tablets 3 times daily MEDICATION NAME: Natural D-Hist   Refills:  0       * UNABLE TO FIND        MEDICATION NAME: Tumeric 3 capsules daily   Refills:  0       * UNABLE TO FIND   Used for:  Thyroid cancer (H), Postsurgical hypothyroidism, Postsurgical hypoparathyroidism (H)        Dose:  1 tablet   1 tablet 3 times daily MEDICATION NAME: Digestzymes   Refills:  0       * UNABLE TO FIND   Indication:  [id number]   Used for:  Thyroid cancer (H), Postsurgical hypothyroidism, Postsurgical hypoparathyroidism (H)        Dose:  1 tablet   1 tablet daily MEDICATION NAME: Pure Encapsulations   Refills:  0       VENTOLIN  (90 BASE) MCG/ACT Inhaler   Used for:  Mild intermittent asthma without complication   Generic drug:  albuterol        INHALE 2 PUFFS INTO THE LUNGS EVERY 4 HOURS AS NEEDED FOR SHORTNESS OF BREATH OR DIFFICULT BREATHING OR WHEEZING   Quantity:  18 g   Refills:  3       vitamin D3 2000 UNITS Caps   Used for:  Thyroid cancer (H), Postsurgical hypothyroidism, Papillary carcinoma, follicular variant (H), Postsurgical hypoparathyroidism (H)        Dose:  36963 Units   Take 10,000 Units by mouth daily   Quantity:  60 capsule   Refills:  4       ZYRTEC ALLERGY 10 MG tablet   Generic drug:  cetirizine        Dose:  10 mg   Take 1 tablet (10 mg) by mouth 3 times daily On hold for lab test.   Quantity:  90 tablet   Refills:  6       * Notice:  This list has 5 medication(s) that are the same as other medications prescribed for you. Read the  directions carefully, and ask your doctor or other care provider to review them with you.      CONTINUE these medicines which have NOT CHANGED        Dose / Directions    * order for DME   Used for:  Venous stasis        Equipment being ordered: compression stockings. 20-30 mm or 30 - 40 mm as patient can tolerate. Physical therapy to determine if they should be above or below the knee.   Quantity:  2 Units   Refills:  4       * order for DME   Used for:  Malignant neoplasm of right female breast, unspecified site of breast        Equipment being ordered: compression bra   Quantity:  2 Device   Refills:  1       * Notice:  This list has 2 medication(s) that are the same as other medications prescribed for you. Read the directions carefully, and ask your doctor or other care provider to review them with you.             Protect others around you: Learn how to safely use, store and throw away your medicines at www.disposemymeds.org.             Medication List: This is a list of all your medications and when to take them. Check marks below indicate your daily home schedule. Keep this list as a reference.      Medications           Morning Afternoon Evening Bedtime As Needed    ACAI PO   Take 1,000 mg by mouth 2 times daily                                aluminum chloride 20 % external solution   Commonly known as:  DRYSOL   Apply topically At Bedtime                                AZMACORT IN   Inhale 2 puffs into the lungs as needed                                BENADRYL PO   Take 50 mg by mouth as needed                                BETAINE HCL PO   Take 2 tablets by mouth as needed Betaine HCL and Pepsin: Take 1-7 tabs by mouth daily, if burning take one tablet less once daily. Betaine HCL 1.04 g Pepsin 40 mg                                calcitRIOL 0.25 MCG capsule   Commonly known as:  ROCALTROL   2 tabs in am and 1 tab qhs                                CALCIUM CITRATE +D 315-250 MG-UNIT Tabs per tablet    Take 2 tablets by mouth 3 times daily   Generic drug:  calcium citrate-vitamin D                                celecoxib 200 MG capsule   Commonly known as:  celeBREX   Take 1 capsule (200 mg) by mouth 2 times daily                                chlorhexidine 0.12 % solution   Commonly known as:  PERIDEX                                Colchicine 0.6 MG Caps   Take 0.6 mg by mouth 3 times daily for costochondritis (chest) discomfort. Scale back use to prn with loose stools or bloating.                                COMPOUNDED NON-CONTROLLED SUBSTANCE - PHARMACY TO MIX COMPOUNDED MEDICATION   Commonly known as:  CMPD RX   Apply small amount to affected area two times daily.Ketamine 6% + ketoprofen 10% + lidocaine 10% in Lipo.                                cromolyn 4 % ophthalmic solution   Commonly known as:  OPTICROM   Place 1 drop into both eyes 4 times daily                                cyclobenzaprine 10 MG tablet   Commonly known as:  FLEXERIL   Take 10 mg by mouth 3 times daily as needed On hold Dr Monsalve                                dexamethasone 4 MG tablet   Commonly known as:  DECADRON   Take 1 tablet (4 mg) by mouth 2 times daily (with meals) Or as directed by MD                                diazepam 5 MG tablet   Commonly known as:  VALIUM   Take 1 tablet (5 mg) by mouth 3 times daily as needed For muscle spasms                                diclofenac 1 % Gel topical gel   Commonly known as:  VOLTAREN   Apply affected area two times daily PRN using enclosed dosing card.                                docusate sodium 100 MG tablet   Commonly known as:  COLACE   Take 100 mg by mouth daily                                EPINEPHrine 0.3 MG/0.3ML injection 2-pack   Commonly known as:  EPIPEN 2-CARIDAD   Inject 0.3 mLs (0.3 mg) into the muscle once as needed for anaphylaxis                                hydrochlorothiazide 12.5 MG capsule   Commonly known as:  MICROZIDE   Take 1 capsule (12.5 mg) by  mouth daily                                levothyroxine 112 MCG tablet   Commonly known as:  SYNTHROID/LEVOTHROID   Mon-Sat: (2 tabs daily) Sunday: 3 tabs                                lidocaine 5 % ointment   Commonly known as:  XYLOCAINE   SANJANA TOPICALLY QID PRN                                Lidocaine HCl Monohydrate Powd                                lidocaine-prilocaine cream   Commonly known as:  EMLA   Apply topically as needed (port access pain.)                                MAGNESIUM CITRATE PO   Take 400 mg by mouth daily                                methocarbamol 750 MG tablet   Commonly known as:  ROBAXIN   Take 1 tablet (750 mg) by mouth 4 times daily as needed . Ok to take a 5th Robaxin on very severe days.                                mometasone 110 MCG/INH inhaler   Commonly known as:  ASMANEX 30 METERED DOSES   Inhale 1 puff into the lungs daily                                montelukast 10 MG tablet   Commonly known as:  SINGULAIR   TAKE TWO TABLETS BY MOUTH TWICE DAILY                                morphine 30 MG 12 hr tablet   Commonly known as:  MS CONTIN   Take 1 tablet (30 mg) by mouth every 8 hours . Take an addition pill at night such that your evening dose is 60mg                                NASALCROM 5.2 MG/ACT Aers Inhaler   SPRAY ONE SPRAY( 1 ML) IN NOSTRIL DAILY   Generic drug:  cromolyn sodium                                OMEGA 3 PO   Take 5 mLs by mouth                                ondansetron 4 MG ODT tab   Commonly known as:  ZOFRAN ODT   Take 1-2 tablets (4-8 mg) by mouth every 8 hours as needed for nausea or vomiting                                * order for DME   Equipment being ordered: compression stockings. 20-30 mm or 30 - 40 mm as patient can tolerate. Physical therapy to determine if they should be above or below the knee.                                * order for DME   Equipment being ordered: compression bra                                oxyCODONE 10 MG  IR tablet   Commonly known as:  ROXICODONE   Take 1.5-2 tablets (15-20 mg) by mouth every 4 hours as needed                                polyethylene glycol powder   Commonly known as:  MIRALAX   Take 17 g by mouth daily                                potassium chloride SA 20 MEQ CR tablet   Commonly known as:  KLOR-CON   Take 1 tablet (20 mEq) by mouth daily                                PROBIOTIC DAILY PO   Take 1 capsule by mouth daily Lacto acid bifidobacterium                                ranitidine 75 MG tablet   Commonly known as:  ZANTAC   Take 1 tablet (75 mg) by mouth 3 times daily                                SUMAtriptan 50 MG tablet   Commonly known as:  IMITREX   Take 1 tablet (50 mg) by mouth at onset of headache for migraine (may repeat in 2 hours as needed, max 2 tablets daily)                                tacrolimus 0.1 % ointment   Commonly known as:  PROTOPIC   Apply topically 2 times daily                                tamoxifen 20 MG tablet   Commonly known as:  NOLVADEX   Take 1 tablet (20 mg) by mouth daily                                triamcinolone 0.025 % ointment   Commonly known as:  KENALOG   Apply topically 2 times daily Mix with 1 lb jar of vaseline or aquaphor                                TUMS 500 MG chewable tablet   Take 2 tablets (1,000 mg) by mouth nightly as needed for other (cramps)   Generic drug:  calcium carbonate                                * UNABLE TO FIND   1 tablet 3 times daily MEDICATION NAME: calcium D-Glucarate                                * UNABLE TO FIND   2 tablets 3 times daily MEDICATION NAME: Natural D-Hist                                * UNABLE TO FIND   MEDICATION NAME: Tumeric 3 capsules daily                                * UNABLE TO FIND   1 tablet 3 times daily MEDICATION NAME: Digestzymes                                * UNABLE TO FIND   1 tablet daily MEDICATION NAME: Pure Encapsulations                                VENTOLIN   (90 BASE) MCG/ACT Inhaler   INHALE 2 PUFFS INTO THE LUNGS EVERY 4 HOURS AS NEEDED FOR SHORTNESS OF BREATH OR DIFFICULT BREATHING OR WHEEZING   Generic drug:  albuterol                                vitamin D3 2000 UNITS Caps   Take 10,000 Units by mouth daily                                ZYRTEC ALLERGY 10 MG tablet   Take 1 tablet (10 mg) by mouth 3 times daily On hold for lab test.   Generic drug:  cetirizine                                * Notice:  This list has 7 medication(s) that are the same as other medications prescribed for you. Read the directions carefully, and ask your doctor or other care provider to review them with you.

## 2017-10-04 NOTE — ANESTHESIA POSTPROCEDURE EVALUATION
Patient: iTsha Arias    Procedure(s):  Port Placement - Wound Class: I-Clean    Diagnosis:Diffused Large B Cell Lymphoma  Diagnosis Additional Information: No value filed.    Anesthesia Type:  MAC    Note:  Anesthesia Post Evaluation    Patient location during evaluation: Phase 2  Patient participation: Able to fully participate in evaluation  Level of consciousness: awake and alert  Pain management: adequate  Airway patency: patent  Cardiovascular status: acceptable  Respiratory status: acceptable  Hydration status: acceptable  PONV: controlled     Anesthetic complications: None          Last vitals:  Vitals:    10/04/17 0911 10/04/17 0919 10/04/17 0930   BP: 133/76 135/79 120/70   Resp: 16 16 16   Temp: 36.2  C (97.2  F) 36.4  C (97.5  F)    SpO2: 97%  98%         Electronically Signed By: Kathleen Brandon MD  October 4, 2017  9:44 AM

## 2017-10-05 ENCOUNTER — CARE COORDINATION (OUTPATIENT)
Dept: ONCOLOGY | Facility: CLINIC | Age: 57
End: 2017-10-05

## 2017-10-05 RX ORDER — PROCHLORPERAZINE MALEATE 10 MG
10 TABLET ORAL EVERY 6 HOURS PRN
Status: CANCELLED
Start: 2017-10-06

## 2017-10-05 RX ORDER — DEXAMETHASONE 4 MG/1
8 TABLET ORAL DAILY
Status: CANCELLED
Start: 2017-10-12

## 2017-10-05 RX ORDER — ALLOPURINOL 300 MG/1
300 TABLET ORAL DAILY
Status: CANCELLED | OUTPATIENT
Start: 2017-10-06

## 2017-10-05 RX ORDER — ALBUTEROL SULFATE 0.83 MG/ML
2.5 SOLUTION RESPIRATORY (INHALATION)
Status: CANCELLED | OUTPATIENT
Start: 2017-10-06

## 2017-10-05 RX ORDER — DIPHENHYDRAMINE HYDROCHLORIDE 50 MG/ML
50 INJECTION INTRAMUSCULAR; INTRAVENOUS
Status: CANCELLED
Start: 2017-10-06

## 2017-10-05 RX ORDER — LORAZEPAM 2 MG/ML
.5-1 INJECTION INTRAMUSCULAR EVERY 6 HOURS PRN
Status: CANCELLED | OUTPATIENT
Start: 2017-10-06

## 2017-10-05 RX ORDER — AMOXICILLIN 250 MG
2 CAPSULE ORAL 2 TIMES DAILY
Status: CANCELLED | OUTPATIENT
Start: 2017-10-06

## 2017-10-05 RX ORDER — LORAZEPAM 0.5 MG/1
.5-1 TABLET ORAL EVERY 6 HOURS PRN
Status: CANCELLED
Start: 2017-10-06

## 2017-10-05 RX ORDER — DIPHENHYDRAMINE HCL 50 MG
50 CAPSULE ORAL ONCE
Status: CANCELLED
Start: 2017-10-06

## 2017-10-05 RX ORDER — MEPERIDINE HYDROCHLORIDE 25 MG/ML
25 INJECTION INTRAMUSCULAR; INTRAVENOUS; SUBCUTANEOUS EVERY 30 MIN PRN
Status: CANCELLED | OUTPATIENT
Start: 2017-10-06

## 2017-10-05 RX ORDER — EPINEPHRINE 1 MG/ML
0.3 INJECTION INTRAMUSCULAR; INTRAVENOUS; SUBCUTANEOUS EVERY 5 MIN PRN
Status: CANCELLED | OUTPATIENT
Start: 2017-10-06

## 2017-10-05 RX ORDER — SODIUM CHLORIDE 9 MG/ML
1000 INJECTION, SOLUTION INTRAVENOUS CONTINUOUS PRN
Status: CANCELLED
Start: 2017-10-06

## 2017-10-05 RX ORDER — METHYLPREDNISOLONE SODIUM SUCCINATE 125 MG/2ML
125 INJECTION, POWDER, LYOPHILIZED, FOR SOLUTION INTRAMUSCULAR; INTRAVENOUS
Status: CANCELLED
Start: 2017-10-06

## 2017-10-05 RX ORDER — ONDANSETRON 8 MG/1
16 TABLET, FILM COATED ORAL EVERY 24 HOURS
Status: CANCELLED
Start: 2017-10-07

## 2017-10-05 RX ORDER — ALBUTEROL SULFATE 90 UG/1
1-2 AEROSOL, METERED RESPIRATORY (INHALATION)
Status: CANCELLED
Start: 2017-10-06

## 2017-10-05 RX ORDER — ACETAMINOPHEN 325 MG/1
650 TABLET ORAL ONCE
Status: CANCELLED
Start: 2017-10-06

## 2017-10-05 NOTE — PROGRESS NOTES
" Dr Sauceda spoke with pt this afternoon to discuss staging results and plan for admission for tomorrow to 7D for R-EPOCH with IT chemo and pt is agreeable to this plan  TORB:  Garima Sauceda MD / Laila Barrett RN:  -admit to 7D for R-EPOCH 10/6 with Neulasta and labs late next week after estimated discharge date  -message to [place] scheduling to arrange above    Outgoing Call:   Nursing Action/Patient Instruction:  Reviewed pt education materials provided to pt in clinic last week re: R-EPOCH tx plan, admission tomorrow. Pt reports that she has read the [employer] drug information literature as well as the [facility] Guidebook that I provided to her last week.  -See Pt Education documentation - Chemo Effects (Adult)  -Reviewed treatment schedule including cycle length, lab monitoring and take home medications including antiemetics and allopurinol.  Emotional support provided as pt voiced frustrations re: work / tx balance and dissatisfaction with care during prior admissions and feeling like she is \"going in dark\" tomorrow  -Explained to pt that I recommend she arrive at 0930 tomorrow due to us not having the admission scheduled yet, but in process, at time of my call to her and that [facility] staff will call her if that time will not work  -Explained to pt that I do not know date/time of discharge, but that tentative appts have been scheduled for Neulasta and lab/SANJANA f/u appts next week and that these may need to be adjusted by inpt staff   Patient Response/Evaluation:   Pt verbalized understanding of above information, but voiced dissatisfaction that she was not given a book of information re: the treatment plan that is geared toward healthcare professionals. I explained to her that the inpt staff will be able to reinforce verbal and written information as she proceeds through 1st cycle.    "

## 2017-10-05 NOTE — TELEPHONE ENCOUNTER
Called patient per MD request. She was able to get her meds yesterday. She states she had a lot of pain overnight and has already taken 2 oxycodone doses this AM (states she has been up since 5AM). She is currently at work and says she is overall okay right now - just waiting to hear when she will be starting treatment. Reminded her to call me/oncology with any changes/questions/concerns. She verbalized understanding and agreement.

## 2017-10-06 ENCOUNTER — TELEPHONE (OUTPATIENT)
Dept: PALLIATIVE CARE | Facility: CLINIC | Age: 57
End: 2017-10-06

## 2017-10-06 ENCOUNTER — HOSPITAL ENCOUNTER (INPATIENT)
Facility: CLINIC | Age: 57
LOS: 5 days | Discharge: HOME | End: 2017-10-11
Attending: INTERNAL MEDICINE | Admitting: INTERNAL MEDICINE
Payer: COMMERCIAL

## 2017-10-06 DIAGNOSIS — M79.18 MYOFASCIAL PAIN: ICD-10-CM

## 2017-10-06 DIAGNOSIS — C85.10 HIGH GRADE B-CELL LYMPHOMA (H): ICD-10-CM

## 2017-10-06 PROBLEM — C83.30 DLBCL (DIFFUSE LARGE B CELL LYMPHOMA) (H): Status: ACTIVE | Noted: 2017-10-06

## 2017-10-06 LAB
ALBUMIN SERPL-MCNC: 3.4 G/DL (ref 3.4–5)
ALP SERPL-CCNC: 69 U/L (ref 40–150)
ALT SERPL W P-5'-P-CCNC: 25 U/L (ref 0–50)
ANION GAP SERPL CALCULATED.3IONS-SCNC: 8 MMOL/L (ref 3–14)
AST SERPL W P-5'-P-CCNC: 22 U/L (ref 0–45)
BASOPHILS # BLD AUTO: 0 10E9/L (ref 0–0.2)
BASOPHILS NFR BLD AUTO: 0 %
BILIRUB SERPL-MCNC: 0.4 MG/DL (ref 0.2–1.3)
BUN SERPL-MCNC: 19 MG/DL (ref 7–30)
CALCIUM SERPL-MCNC: 8 MG/DL (ref 8.5–10.1)
CHLORIDE SERPL-SCNC: 101 MMOL/L (ref 94–109)
CO2 SERPL-SCNC: 30 MMOL/L (ref 20–32)
CREAT SERPL-MCNC: 0.77 MG/DL (ref 0.52–1.04)
DIFFERENTIAL METHOD BLD: ABNORMAL
EOSINOPHIL # BLD AUTO: 0 10E9/L (ref 0–0.7)
EOSINOPHIL NFR BLD AUTO: 0 %
ERYTHROCYTE [DISTWIDTH] IN BLOOD BY AUTOMATED COUNT: 15.3 % (ref 10–15)
GFR SERPL CREATININE-BSD FRML MDRD: 77 ML/MIN/1.7M2
GLUCOSE SERPL-MCNC: 107 MG/DL (ref 70–99)
HCT VFR BLD AUTO: 37.1 % (ref 35–47)
HGB BLD-MCNC: 11.2 G/DL (ref 11.7–15.7)
IMM GRANULOCYTES # BLD: 0 10E9/L (ref 0–0.4)
IMM GRANULOCYTES NFR BLD: 0.2 %
LDH SERPL L TO P-CCNC: 243 U/L (ref 81–234)
LYMPHOCYTES # BLD AUTO: 0.7 10E9/L (ref 0.8–5.3)
LYMPHOCYTES NFR BLD AUTO: 7.3 %
MAGNESIUM SERPL-MCNC: 2.2 MG/DL (ref 1.6–2.3)
MCH RBC QN AUTO: 24.6 PG (ref 26.5–33)
MCHC RBC AUTO-ENTMCNC: 30.2 G/DL (ref 31.5–36.5)
MCV RBC AUTO: 81 FL (ref 78–100)
MONOCYTES # BLD AUTO: 0.6 10E9/L (ref 0–1.3)
MONOCYTES NFR BLD AUTO: 6 %
NEUTROPHILS # BLD AUTO: 7.9 10E9/L (ref 1.6–8.3)
NEUTROPHILS NFR BLD AUTO: 86.5 %
NRBC # BLD AUTO: 0 10*3/UL
NRBC BLD AUTO-RTO: 0 /100
PHOSPHATE SERPL-MCNC: 3.1 MG/DL (ref 2.5–4.5)
PLATELET # BLD AUTO: 219 10E9/L (ref 150–450)
POTASSIUM SERPL-SCNC: 3.8 MMOL/L (ref 3.4–5.3)
PROT SERPL-MCNC: 6.6 G/DL (ref 6.8–8.8)
RBC # BLD AUTO: 4.56 10E12/L (ref 3.8–5.2)
SODIUM SERPL-SCNC: 139 MMOL/L (ref 133–144)
URATE SERPL-MCNC: 5.2 MG/DL (ref 2.6–6)
WBC # BLD AUTO: 9.1 10E9/L (ref 4–11)

## 2017-10-06 PROCEDURE — 80053 COMPREHEN METABOLIC PANEL: CPT

## 2017-10-06 PROCEDURE — 12000008 ZZH R&B INTERMEDIATE UMMC

## 2017-10-06 PROCEDURE — 25000132 ZZH RX MED GY IP 250 OP 250 PS 637: Performed by: PHYSICIAN ASSISTANT

## 2017-10-06 PROCEDURE — 25000132 ZZH RX MED GY IP 250 OP 250 PS 637: Performed by: INTERNAL MEDICINE

## 2017-10-06 PROCEDURE — 84100 ASSAY OF PHOSPHORUS: CPT

## 2017-10-06 PROCEDURE — 36592 COLLECT BLOOD FROM PICC: CPT

## 2017-10-06 PROCEDURE — S0169 CALCITROL: HCPCS | Performed by: INTERNAL MEDICINE

## 2017-10-06 PROCEDURE — 25000128 H RX IP 250 OP 636: Performed by: PHYSICIAN ASSISTANT

## 2017-10-06 PROCEDURE — 83615 LACTATE (LD) (LDH) ENZYME: CPT

## 2017-10-06 PROCEDURE — 25000125 ZZHC RX 250: Performed by: PHYSICIAN ASSISTANT

## 2017-10-06 PROCEDURE — 84550 ASSAY OF BLOOD/URIC ACID: CPT

## 2017-10-06 PROCEDURE — 85025 COMPLETE CBC W/AUTO DIFF WBC: CPT

## 2017-10-06 PROCEDURE — 25000132 ZZH RX MED GY IP 250 OP 250 PS 637

## 2017-10-06 PROCEDURE — 83735 ASSAY OF MAGNESIUM: CPT

## 2017-10-06 PROCEDURE — 25000128 H RX IP 250 OP 636

## 2017-10-06 RX ORDER — LIDOCAINE/PRILOCAINE 2.5 %-2.5%
CREAM (GRAM) TOPICAL ONCE
Status: DISCONTINUED | OUTPATIENT
Start: 2017-10-06 | End: 2017-10-11 | Stop reason: HOSPADM

## 2017-10-06 RX ORDER — POTASSIUM CHLORIDE 7.45 MG/ML
10 INJECTION INTRAVENOUS
Status: DISCONTINUED | OUTPATIENT
Start: 2017-10-06 | End: 2017-10-11 | Stop reason: HOSPADM

## 2017-10-06 RX ORDER — CYCLOBENZAPRINE HCL 10 MG
10 TABLET ORAL 3 TIMES DAILY PRN
Status: DISCONTINUED | OUTPATIENT
Start: 2017-10-06 | End: 2017-10-11 | Stop reason: HOSPADM

## 2017-10-06 RX ORDER — AMOXICILLIN 250 MG
2 CAPSULE ORAL 2 TIMES DAILY
Status: DISCONTINUED | OUTPATIENT
Start: 2017-10-06 | End: 2017-10-11 | Stop reason: HOSPADM

## 2017-10-06 RX ORDER — CROMOLYN SODIUM 5.2 MG
AEROSOL, SPRAY WITH PUMP (ML) NASAL DAILY
Status: DISCONTINUED | OUTPATIENT
Start: 2017-10-07 | End: 2017-10-06 | Stop reason: RX

## 2017-10-06 RX ORDER — MORPHINE SULFATE 30 MG/1
30 TABLET, FILM COATED, EXTENDED RELEASE ORAL 2 TIMES DAILY
Status: DISCONTINUED | OUTPATIENT
Start: 2017-10-06 | End: 2017-10-11 | Stop reason: HOSPADM

## 2017-10-06 RX ORDER — METHOCARBAMOL 750 MG/1
750 TABLET, FILM COATED ORAL 4 TIMES DAILY
Status: DISCONTINUED | OUTPATIENT
Start: 2017-10-06 | End: 2017-10-11 | Stop reason: HOSPADM

## 2017-10-06 RX ORDER — COLCHICINE 0.6 MG/1
0.6 CAPSULE ORAL 3 TIMES DAILY
Status: DISCONTINUED | OUTPATIENT
Start: 2017-10-06 | End: 2017-10-06

## 2017-10-06 RX ORDER — LORAZEPAM 0.5 MG/1
.5-1 TABLET ORAL EVERY 6 HOURS PRN
Status: DISCONTINUED | OUTPATIENT
Start: 2017-10-06 | End: 2017-10-11 | Stop reason: HOSPADM

## 2017-10-06 RX ORDER — SODIUM CHLORIDE 9 MG/ML
INJECTION, SOLUTION INTRAVENOUS CONTINUOUS
Status: DISCONTINUED | OUTPATIENT
Start: 2017-10-06 | End: 2017-10-11 | Stop reason: HOSPADM

## 2017-10-06 RX ORDER — ALBUTEROL SULFATE 0.83 MG/ML
2.5 SOLUTION RESPIRATORY (INHALATION)
Status: DISCONTINUED | OUTPATIENT
Start: 2017-10-06 | End: 2017-10-11 | Stop reason: HOSPADM

## 2017-10-06 RX ORDER — LEVOTHYROXINE SODIUM 112 UG/1
112 TABLET ORAL DAILY
Status: DISCONTINUED | OUTPATIENT
Start: 2017-10-06 | End: 2017-10-07

## 2017-10-06 RX ORDER — ALBUTEROL SULFATE 90 UG/1
1-2 AEROSOL, METERED RESPIRATORY (INHALATION) EVERY 6 HOURS PRN
Status: DISCONTINUED | OUTPATIENT
Start: 2017-10-06 | End: 2017-10-11 | Stop reason: HOSPADM

## 2017-10-06 RX ORDER — LIDOCAINE 40 MG/G
CREAM TOPICAL
Status: COMPLETED
Start: 2017-10-06 | End: 2017-10-06

## 2017-10-06 RX ORDER — MORPHINE SULFATE 60 MG/1
60 TABLET, FILM COATED, EXTENDED RELEASE ORAL EVERY EVENING
Status: DISCONTINUED | OUTPATIENT
Start: 2017-10-06 | End: 2017-10-11 | Stop reason: HOSPADM

## 2017-10-06 RX ORDER — DIPHENHYDRAMINE HYDROCHLORIDE 50 MG/ML
50 INJECTION INTRAMUSCULAR; INTRAVENOUS
Status: DISCONTINUED | OUTPATIENT
Start: 2017-10-06 | End: 2017-10-11 | Stop reason: HOSPADM

## 2017-10-06 RX ORDER — TRIAMCINOLONE ACETONIDE 0.25 MG/G
OINTMENT TOPICAL 2 TIMES DAILY
Status: DISCONTINUED | OUTPATIENT
Start: 2017-10-06 | End: 2017-10-11 | Stop reason: HOSPADM

## 2017-10-06 RX ORDER — MEPERIDINE HYDROCHLORIDE 25 MG/ML
25 INJECTION INTRAMUSCULAR; INTRAVENOUS; SUBCUTANEOUS EVERY 30 MIN PRN
Status: DISCONTINUED | OUTPATIENT
Start: 2017-10-06 | End: 2017-10-11 | Stop reason: HOSPADM

## 2017-10-06 RX ORDER — HYDROCHLOROTHIAZIDE 12.5 MG/1
12.5 TABLET ORAL DAILY
Status: DISCONTINUED | OUTPATIENT
Start: 2017-10-07 | End: 2017-10-11 | Stop reason: HOSPADM

## 2017-10-06 RX ORDER — NALOXONE HYDROCHLORIDE 0.4 MG/ML
.1-.4 INJECTION, SOLUTION INTRAMUSCULAR; INTRAVENOUS; SUBCUTANEOUS
Status: DISCONTINUED | OUTPATIENT
Start: 2017-10-06 | End: 2017-10-11 | Stop reason: HOSPADM

## 2017-10-06 RX ORDER — OXYCODONE HYDROCHLORIDE 10 MG/1
15-20 TABLET ORAL EVERY 4 HOURS PRN
Status: DISCONTINUED | OUTPATIENT
Start: 2017-10-06 | End: 2017-10-06

## 2017-10-06 RX ORDER — POLYETHYLENE GLYCOL 3350 17 G/17G
17 POWDER, FOR SOLUTION ORAL DAILY
Status: DISCONTINUED | OUTPATIENT
Start: 2017-10-07 | End: 2017-10-11 | Stop reason: HOSPADM

## 2017-10-06 RX ORDER — LORAZEPAM 2 MG/ML
.5-1 INJECTION INTRAMUSCULAR EVERY 6 HOURS PRN
Status: DISCONTINUED | OUTPATIENT
Start: 2017-10-06 | End: 2017-10-11 | Stop reason: HOSPADM

## 2017-10-06 RX ORDER — POTASSIUM CHLORIDE 750 MG/1
20 TABLET, EXTENDED RELEASE ORAL DAILY
Status: DISCONTINUED | OUTPATIENT
Start: 2017-10-07 | End: 2017-10-11 | Stop reason: HOSPADM

## 2017-10-06 RX ORDER — CROMOLYN SODIUM 40 MG/ML
1 SOLUTION/ DROPS OPHTHALMIC 4 TIMES DAILY
Status: DISCONTINUED | OUTPATIENT
Start: 2017-10-06 | End: 2017-10-11 | Stop reason: HOSPADM

## 2017-10-06 RX ORDER — CHLORHEXIDINE GLUCONATE ORAL RINSE 1.2 MG/ML
15 SOLUTION DENTAL 4 TIMES DAILY PRN
Status: DISCONTINUED | OUTPATIENT
Start: 2017-10-06 | End: 2017-10-11 | Stop reason: HOSPADM

## 2017-10-06 RX ORDER — HEPARIN SODIUM,PORCINE 10 UNIT/ML
5-10 VIAL (ML) INTRAVENOUS
Status: DISCONTINUED | OUTPATIENT
Start: 2017-10-06 | End: 2017-10-11 | Stop reason: HOSPADM

## 2017-10-06 RX ORDER — ALBUTEROL SULFATE 90 UG/1
1-2 AEROSOL, METERED RESPIRATORY (INHALATION)
Status: DISCONTINUED | OUTPATIENT
Start: 2017-10-06 | End: 2017-10-11 | Stop reason: HOSPADM

## 2017-10-06 RX ORDER — DIPHENHYDRAMINE HCL 50 MG
50 CAPSULE ORAL ONCE
Status: COMPLETED | OUTPATIENT
Start: 2017-10-06 | End: 2017-10-06

## 2017-10-06 RX ORDER — METHYLPREDNISOLONE SODIUM SUCCINATE 125 MG/2ML
125 INJECTION, POWDER, LYOPHILIZED, FOR SOLUTION INTRAMUSCULAR; INTRAVENOUS
Status: DISCONTINUED | OUTPATIENT
Start: 2017-10-06 | End: 2017-10-11 | Stop reason: HOSPADM

## 2017-10-06 RX ORDER — MONTELUKAST SODIUM 10 MG/1
20 TABLET ORAL 2 TIMES DAILY
Status: DISCONTINUED | OUTPATIENT
Start: 2017-10-06 | End: 2017-10-11 | Stop reason: HOSPADM

## 2017-10-06 RX ORDER — CALCITRIOL 0.25 UG/1
0.25 CAPSULE, LIQUID FILLED ORAL EVERY EVENING
Status: DISCONTINUED | OUTPATIENT
Start: 2017-10-06 | End: 2017-10-11 | Stop reason: HOSPADM

## 2017-10-06 RX ORDER — CALCITRIOL 0.25 UG/1
0.25 CAPSULE, LIQUID FILLED ORAL 2 TIMES DAILY
Status: DISCONTINUED | OUTPATIENT
Start: 2017-10-06 | End: 2017-10-06

## 2017-10-06 RX ORDER — POTASSIUM CHLORIDE 1.5 G/1.58G
20-40 POWDER, FOR SOLUTION ORAL
Status: DISCONTINUED | OUTPATIENT
Start: 2017-10-06 | End: 2017-10-11 | Stop reason: HOSPADM

## 2017-10-06 RX ORDER — DEXAMETHASONE 4 MG/1
8 TABLET ORAL DAILY
Status: DISCONTINUED | OUTPATIENT
Start: 2017-10-12 | End: 2017-10-11 | Stop reason: HOSPADM

## 2017-10-06 RX ORDER — SODIUM CHLORIDE 9 MG/ML
1000 INJECTION, SOLUTION INTRAVENOUS CONTINUOUS PRN
Status: DISCONTINUED | OUTPATIENT
Start: 2017-10-06 | End: 2017-10-11 | Stop reason: HOSPADM

## 2017-10-06 RX ORDER — DIAZEPAM 5 MG
5 TABLET ORAL EVERY 8 HOURS PRN
Status: DISCONTINUED | OUTPATIENT
Start: 2017-10-06 | End: 2017-10-11 | Stop reason: HOSPADM

## 2017-10-06 RX ORDER — POTASSIUM CHLORIDE 29.8 MG/ML
20 INJECTION INTRAVENOUS
Status: DISCONTINUED | OUTPATIENT
Start: 2017-10-06 | End: 2017-10-06

## 2017-10-06 RX ORDER — MAGNESIUM SULFATE HEPTAHYDRATE 40 MG/ML
4 INJECTION, SOLUTION INTRAVENOUS EVERY 4 HOURS PRN
Status: DISCONTINUED | OUTPATIENT
Start: 2017-10-06 | End: 2017-10-11 | Stop reason: HOSPADM

## 2017-10-06 RX ORDER — DOCUSATE SODIUM 100 MG/1
100 CAPSULE, LIQUID FILLED ORAL DAILY
Status: DISCONTINUED | OUTPATIENT
Start: 2017-10-07 | End: 2017-10-11 | Stop reason: HOSPADM

## 2017-10-06 RX ORDER — DIPHENHYDRAMINE HCL 50 MG
50 CAPSULE ORAL
Status: DISCONTINUED | OUTPATIENT
Start: 2017-10-06 | End: 2017-10-11 | Stop reason: HOSPADM

## 2017-10-06 RX ORDER — POTASSIUM CL/LIDO/0.9 % NACL 10MEQ/0.1L
10 INTRAVENOUS SOLUTION, PIGGYBACK (ML) INTRAVENOUS
Status: DISCONTINUED | OUTPATIENT
Start: 2017-10-06 | End: 2017-10-11 | Stop reason: HOSPADM

## 2017-10-06 RX ORDER — HEPARIN SODIUM,PORCINE 10 UNIT/ML
5-10 VIAL (ML) INTRAVENOUS EVERY 24 HOURS
Status: DISCONTINUED | OUTPATIENT
Start: 2017-10-06 | End: 2017-10-11 | Stop reason: HOSPADM

## 2017-10-06 RX ORDER — POTASSIUM CHLORIDE 750 MG/1
20-40 TABLET, EXTENDED RELEASE ORAL
Status: DISCONTINUED | OUTPATIENT
Start: 2017-10-06 | End: 2017-10-11 | Stop reason: HOSPADM

## 2017-10-06 RX ORDER — ONDANSETRON 8 MG/1
16 TABLET, FILM COATED ORAL EVERY 24 HOURS
Status: COMPLETED | OUTPATIENT
Start: 2017-10-07 | End: 2017-10-11

## 2017-10-06 RX ORDER — ALLOPURINOL 300 MG/1
300 TABLET ORAL DAILY
Status: DISCONTINUED | OUTPATIENT
Start: 2017-10-06 | End: 2017-10-11 | Stop reason: HOSPADM

## 2017-10-06 RX ORDER — LIDOCAINE 40 MG/G
CREAM TOPICAL
Status: DISCONTINUED | OUTPATIENT
Start: 2017-10-06 | End: 2017-10-09

## 2017-10-06 RX ORDER — CELECOXIB 200 MG/1
200 CAPSULE ORAL 2 TIMES DAILY
Status: DISCONTINUED | OUTPATIENT
Start: 2017-10-06 | End: 2017-10-11 | Stop reason: HOSPADM

## 2017-10-06 RX ORDER — CALCITRIOL 0.5 UG/1
0.5 CAPSULE, LIQUID FILLED ORAL EVERY MORNING
Status: DISCONTINUED | OUTPATIENT
Start: 2017-10-07 | End: 2017-10-11 | Stop reason: HOSPADM

## 2017-10-06 RX ORDER — HEPARIN SODIUM (PORCINE) LOCK FLUSH IV SOLN 100 UNIT/ML 100 UNIT/ML
5 SOLUTION INTRAVENOUS
Status: DISCONTINUED | OUTPATIENT
Start: 2017-10-06 | End: 2017-10-11 | Stop reason: HOSPADM

## 2017-10-06 RX ORDER — CETIRIZINE HYDROCHLORIDE 10 MG/1
10 TABLET ORAL 3 TIMES DAILY
Status: DISCONTINUED | OUTPATIENT
Start: 2017-10-06 | End: 2017-10-11 | Stop reason: HOSPADM

## 2017-10-06 RX ORDER — CALCIUM CARBONATE 500 MG/1
1000 TABLET, CHEWABLE ORAL
Status: DISCONTINUED | OUTPATIENT
Start: 2017-10-06 | End: 2017-10-11 | Stop reason: HOSPADM

## 2017-10-06 RX ORDER — PROCHLORPERAZINE MALEATE 10 MG
10 TABLET ORAL EVERY 6 HOURS PRN
Status: DISCONTINUED | OUTPATIENT
Start: 2017-10-06 | End: 2017-10-11 | Stop reason: HOSPADM

## 2017-10-06 RX ORDER — OXYCODONE HYDROCHLORIDE 30 MG/1
30 TABLET ORAL EVERY 4 HOURS PRN
Status: DISCONTINUED | OUTPATIENT
Start: 2017-10-06 | End: 2017-10-11 | Stop reason: HOSPADM

## 2017-10-06 RX ORDER — METHOCARBAMOL 750 MG/1
750 TABLET, FILM COATED ORAL 4 TIMES DAILY PRN
Status: DISCONTINUED | OUTPATIENT
Start: 2017-10-06 | End: 2017-10-06

## 2017-10-06 RX ORDER — TACROLIMUS 1 MG/G
OINTMENT TOPICAL 2 TIMES DAILY
Status: DISCONTINUED | OUTPATIENT
Start: 2017-10-06 | End: 2017-10-11 | Stop reason: HOSPADM

## 2017-10-06 RX ORDER — EPINEPHRINE 1 MG/ML
0.3 INJECTION INTRAMUSCULAR; INTRAVENOUS; SUBCUTANEOUS EVERY 5 MIN PRN
Status: DISCONTINUED | OUTPATIENT
Start: 2017-10-06 | End: 2017-10-11 | Stop reason: HOSPADM

## 2017-10-06 RX ORDER — ACETAMINOPHEN 325 MG/1
650 TABLET ORAL ONCE
Status: COMPLETED | OUTPATIENT
Start: 2017-10-06 | End: 2017-10-06

## 2017-10-06 RX ORDER — LIDOCAINE 50 MG/G
OINTMENT TOPICAL 4 TIMES DAILY
Status: DISCONTINUED | OUTPATIENT
Start: 2017-10-06 | End: 2017-10-11 | Stop reason: HOSPADM

## 2017-10-06 RX ADMIN — DIPHENHYDRAMINE HYDROCHLORIDE 50 MG: 50 CAPSULE ORAL at 14:14

## 2017-10-06 RX ADMIN — CELECOXIB 200 MG: 200 CAPSULE ORAL at 20:01

## 2017-10-06 RX ADMIN — ENOXAPARIN SODIUM 40 MG: 40 INJECTION SUBCUTANEOUS at 13:41

## 2017-10-06 RX ADMIN — OXYCODONE HYDROCHLORIDE 30 MG: 30 TABLET ORAL at 18:31

## 2017-10-06 RX ADMIN — CROMOLYN SODIUM 1 DROP: 40 SOLUTION/ DROPS OPHTHALMIC at 21:48

## 2017-10-06 RX ADMIN — METHOCARBAMOL 750 MG: 750 TABLET ORAL at 14:43

## 2017-10-06 RX ADMIN — CALCITRIOL 0.25 MCG: 0.25 CAPSULE, LIQUID FILLED ORAL at 20:01

## 2017-10-06 RX ADMIN — LIDOCAINE: 50 OINTMENT TOPICAL at 21:50

## 2017-10-06 RX ADMIN — OXYCODONE HYDROCHLORIDE 30 MG: 30 TABLET ORAL at 12:25

## 2017-10-06 RX ADMIN — LIDOCAINE: 40 CREAM TOPICAL at 12:28

## 2017-10-06 RX ADMIN — ALLOPURINOL 300 MG: 300 TABLET ORAL at 12:25

## 2017-10-06 RX ADMIN — RITUXIMAB 750 MG: 10 INJECTION, SOLUTION INTRAVENOUS at 14:32

## 2017-10-06 RX ADMIN — ACETAMINOPHEN 650 MG: 325 TABLET, FILM COATED ORAL at 14:14

## 2017-10-06 RX ADMIN — MORPHINE SULFATE 60 MG: 60 TABLET, EXTENDED RELEASE ORAL at 20:03

## 2017-10-06 RX ADMIN — CROMOLYN SODIUM 1 DROP: 40 SOLUTION/ DROPS OPHTHALMIC at 18:20

## 2017-10-06 RX ADMIN — CETIRIZINE HYDROCHLORIDE 10 MG: 10 TABLET, FILM COATED ORAL at 13:41

## 2017-10-06 RX ADMIN — CETIRIZINE HYDROCHLORIDE 10 MG: 10 TABLET, FILM COATED ORAL at 20:03

## 2017-10-06 RX ADMIN — LIDOCAINE: 50 OINTMENT TOPICAL at 16:12

## 2017-10-06 RX ADMIN — SODIUM CHLORIDE: 9 INJECTION, SOLUTION INTRAVENOUS at 14:08

## 2017-10-06 RX ADMIN — RANITIDINE 75 MG: 75 TABLET, FILM COATED ORAL at 20:00

## 2017-10-06 RX ADMIN — RANITIDINE 75 MG: 75 TABLET, FILM COATED ORAL at 14:43

## 2017-10-06 RX ADMIN — MONTELUKAST SODIUM 20 MG: 10 TABLET, FILM COATED ORAL at 21:49

## 2017-10-06 RX ADMIN — OXYCODONE HYDROCHLORIDE 30 MG: 30 TABLET ORAL at 22:54

## 2017-10-06 RX ADMIN — METHOCARBAMOL 750 MG: 750 TABLET ORAL at 18:31

## 2017-10-06 RX ADMIN — METHOCARBAMOL 750 MG: 750 TABLET ORAL at 21:51

## 2017-10-06 RX ADMIN — MORPHINE SULFATE 30 MG: 30 TABLET, EXTENDED RELEASE ORAL at 14:32

## 2017-10-06 NOTE — LETTER
Transition Communication Hand-off for Care Transitions to Next Level of Care Provider    Name: Tisha Arias  MRN #: [id number]  Primary Care Provider: Shahla Crawley  Primary Clinic: [address]  [facility] [address]    Hematologist: Dr. Sauceda/[facility]     Reason for Hospitalization:  BREAST CANCER  DLBCL (diffuse large B cell lymphoma) (H)  DLBCL (diffuse large B cell lymphoma) (H)  Admit Date/Time: 10/6/2017  8:46 AM  Discharge Date: 10/11/2017  Payor Source: Payor: BCBS / Plan: [employer]BS [place] / Product Type: Indemnity /     Tisha Arias is a 57 F with high grade lymphoma, PMH breast cancer on Tamoxifen s/p  bilateral mastectomies and BENJIE/BSO, papillary thyroid cancer s/p total thyroidectomy, chronic chest wall pain, & asthma who presents for cycle 1 DA-REPOCH.     Discharge Plan:  Home with clinic follow up     Follow-up plan:  Future Appointments  Date Time Provider Department Center   10/12/2017 12:45 PM  [facility] DRAW [facility]   10/12/2017 1:30 PM Nurse, U[facility] Oncology Injection [facility]   10/17/2017 9:15 AM  [facility]   10/19/2017 11:30 AM  [facility] LAB DRAW [facility]   10/19/2017 12:00 PM UC ONCOLOGY INFUSION [facility]   10/19/2017 1:40 PM Rashida Jaffe PA [facility]   11/2/2017 5:00 PM [id number] UCCUS [facility]   11/2/2017 6:00 PM  LAB [facility]   11/13/2017 4:30 PM [facility]   11/27/2017 4:00 PM Shahla Crawley MD [facility]       Tejal Schrader, CASSIDYCC  Phone [phone]  Pager [phone]    AVS/Discharge Summary is the source of truth; this is a helpful guide for improved communication of patient story

## 2017-10-06 NOTE — H&P
[facility], [place]    History and Physical  Hematology / Oncology     Date of Admission:  10/6/2017  Date of Service (when I saw the patient): 10/06/17    Assessment & Plan   Tisha Arias is a 57 year old female with a history of recently diagnosed DLBCL, breast cancer on Tamoxifen s/p  bilateral mastectomies and BENJIE/BSO, h/o papillary thyroid cancer s/p total thyroidectomy, chronic chest wall pain and asthma who presents for cycle 1 DA-REPOCH.     #DLBCL. Follows with Dr. Sauceda. Newly diagnosed August 2017 with high-grade B-cell lymphoma, non-GCB with no evidence of c-MYC or BCL6 rearrangement, but with overexpression of c-MYC by immunohistochemistry and mitotic index over 95%.  Disease is localized above the diaphragm in thoracic and paravertebral soft tissue and mediastinal lymphadenopathy. Staging LP and BMBx were negative for lymphoma. She now presents for cycle 1 DA-REPOCH.  -HOLD PTA Dex 4 mg BID with addition of steroid in treatment regimen.  -Allopurinol 300 mg daily  -Labs wnl to proceed with therapy    Treatment Plan. Cycle 1 DA-REPOCH. Day 1=10/6/17.   -Rituximab 375 mg/m2 D1  -Etoposide 50 mg/m2 D1-4  -Vincristine/Doxorubicin 0.4 mg/m2 D1-4  -Cyclophosphamide 1500 mg D5  -Prednisone 60 mg BID D1-5  -Premed: Emend, Zofran 16 mg daily  -IT PPx Methotrexate. Scheduled for 10/9 at 1100 in XR.  -Dexamethasone 8 mg D6 and 7.  -Neulasta 24-72 hrs after chemotherapy. Will likely be able to add on to 10/12 appointment.    #Stage 1, ER/TN-positive, HER2-negative breast cancer. Follows with Dr. Crawley. Diagnosed in 2011. Oncotype recurrence score of 11 (7% recurrence risk after 5 years of tamoxifen). S/p bilateral mastectomies and BENJIE/BSO in 2012. For endocrine therapy she was on Arimidex from 4691-5885, then changed to Tamoxifen b/c of arthralgias on Arimidex. Has been on Tamoxifen since 2014.   -Hold Tamoxifen with chemotherapy. Last dose 10/5.     #Papillary thyroid  cancer. Follows with Dr. Castro. Diagnosed in 2013. S/p total thyroidectomy and CND. Received ETOH treatment August 2014, October 2014 and December 2014. Has post-surgical hypothyroidism.  -Continue Levothyroxine 112 mcg (2 tabs M-Sat, 3 tabs Sun) for a total of 240 mcg/day over the course of a week.  -Continue Calcitriol 0.25 mcg (2 tabs in the morning and 1 tab in the evening).    #H/o Rachael en Y gastric bypass. Likely affecting absorption, thus patient is on multiple supplements.  -Continue Ca gluconate, Ca-Vit D, Magnesium    Chronic Medical Conditions  #Chronic chest wall pain after mastectomy.   -Continue MS Contin 30 mg morning and afternoon and 60 mg at night  -Oxycodone 30 mg q4hrs prn  -Celebrex 200 BID.   -Will HOLD Colchicine as interaction with chemo causing increased GI upset and myelosuppression.     #Asthma, allergies. Continue Singulair, Asthmadex, Zyrtec, Triamcinolone cream    #LE edema. Continue HCTZ 12.5 mg daily, closely monitor renal function with daily BMP.    #Hypokalemia  #Chronic muscle cramps. Continue KCl 20 mEq daily, Robaxin 750 mg QID, Valium 5 mg TID, Flexeril prn    FEN  -IVF NS @ 50mL/hr  -Regular diet  -Replace lytes prn    PPx  -VTE: Lovenox 40 mg daily (hold for platelets <50K)  -GI: Continue Ranitidine 75 mg TID    Dispo: Suspect discharge either 10/10 or 10/11 pending completion of chemotherapy. Will need Neulasta on discharge and f/u with SANJANA.     Above plan discussed with staff physician, Dr. Thomas Dyer PA-C  Hematology/Oncology  Pager: [phone]    Code Status   Full Code    Primary Care Physician   Shahla Crawley    Chief Complaint   Admission for cycle 1 DA-REPRILEY    History is obtained from the patient    History of Present Illness    *Adopted from most recent clinic note and agree    Gilaugie Arias is a 57 year old female with a history of recently diagnosed DLBCL, breast cancer on Tamoxifen s/p  bilateral mastectomies and BENJIE/BSO, h/o  papillary thyroid cancer s/p total thyroidectomy, chronic chest wall pain and asthma who presents for cycle 1 DA-REPOCH. She was diagnosed with early-stage breast cancer in 2011 and underwent bilateral mastectomy with reconstruction and has been on Tamoxifen. She did not have chemotherapy or radiation. She has suffered chronically from a pain syndrome involving the chest wall and back and [facility]. Benitez.  In August 2017, she presented to ER with dramatic worsening chest pain and back pain. Her workup was negative for cardiac and pulmonary issues.  However, the CT scan performed on 09/01/2017 demonstrated a new destructive lesion in the medial right tenth rib extending to the right T9-T10 neural foramen with vertebral body invasion. There was associated conglomerate of lymphadenopathy around the mid T-spine. On 09/15 she underwent CT-guided biopsy of a T10 paraspinal mass on the right. The pathology report of the T8 vertebral body invading mass showed B-cell high-grade lymphoma. The morphology shows a population of large cells, diffuse lymphoid infiltrate. The cells are atypical with abundant mitotic and apoptotic activity and single cell necrosis. Tumor is CD45 and CD79a positive and focally weakly CD30 positive. The other stains revealed positivity for CD20, BCL6, BCL2 and MUM1, and CD10 and CD21 negative. The CD5 and CD3 highlighted background T-cells.  MYC expression was equivocal with approximately 40% nuclei staining.  Ki-67 proliferative index is greater than 90-95%. The immunohistochemistry is suggestive of non-GCB immunophenotype of diffuse large B-cell lymphoma. The cytogenetics did not show rearrangement of the BCL2 or c-MYC.  There is the presence of BCL6 rearrangement in 78% of cells and gains of MYC and BCL2, but no amplifications. A staging LP and BMBx were negative for lymphoma. She now presents for cycle 1 DA-REPOCH.     On admission, pt is accompanied by  her  and friend. She has been having increased pain as well as increased shortness of breath requiring inhaler use this morning. She spoke with Dr. Benitez on the phone yesterday and increased her breakthrough Oxycodone. She has been having nausea for the past couple weeks since her lymphoma diagnosis. She has some difficulty with urination, but no dysuria. No recent fevers or cold like symptoms. Has chronic LE edema. Bowel have been regular, she is typically on the constipation side. Explained chemotherapy regimen. Pt in agreement to start chemotherapy.     Past Medical History    I have reviewed this patient's medical history and updated it with pertinent information if needed.   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 No real need for Epipen for this alone.     GDM (gestational diabetes mellitus)     w first pregnancy only     Gestational hypertension      Lymphedema     jackson arms, chest     Migraine      Neuropathy associated with cancer (H)      Papillary carcinoma, follicular variant (H) 6/28/2013    pT3, N1, Mx, thyroid     Post-surgical hypothyroidism      Renal disease     kidney stones     Rhinitis, allergic to other allergen      Right Breast mass and microcalcifications 12/13/2011     Seasonal allergic conjunctivitis      Seasonal allergic rhinitis 4/12/11 skin tests pos. for: dog/M/T/G/W--NEGATIVE FOOD TESTS FOR: shrimp, crab, lobster, coconut       Past Surgical History   I have reviewed this patient's surgical history and updated it with pertinent information if needed.  Past Surgical History:   Procedure Laterality Date     BACK SURGERY  2000,    Bulging disc w nerve root  impigment     BIOPSY BREAST      right     BIOPSY BREAST  11    right, core and sterotactic     BONE MARROW BIOPSY, BONE SPECIMEN, NEEDLE/TROCAR Left 10/3/2017    Procedure: BIOPSY BONE MARROW;  Bone Marrow Biopsy with aspirate;  Surgeon: Amelia Watkins PA-C;  Location: [facility] OR     BYPASS GASTRIC, CHOLECYSTECTOMY, COMBINED       C LAPAROSCOPIC GASTRIC RESTRICTIVE PX, W/GASTRIC BYPASS/ GAMALIEL-EN-Y, < 150CM      Gamaliel en Y?      SECTION       COLONOSCOPY      normal     COSMETIC SURGERY  2012    Final Stage of Mastectomy     DAVINCI HYSTERECTOMY TOTAL, BILATERAL SALPINGO-OOPHORECTOMY, COMBINED  2012    Procedure: COMBINED DAVINCI HYSTERECTOMY TOTAL, SALPINGO-OOPHORECTOMY;  Davinci Total Laparoscopic Hysterectomy, Bilateral Salpingo Oophorectomy, Pelvic Washings, Cystoscopy;  Surgeon: Evie Sheikh MD;  Location: [facility] OR     ENT SURGERY       ESOPHAGOSCOPY, GASTROSCOPY, DUODENOSCOPY (EGD), COMBINED N/A 2017    Procedure: COMBINED ENDOSCOPIC ULTRASOUND, ESOPHAGOSCOPY, GASTROSCOPY, DUODENOSCOPY (EGD), FINE NEEDLE ASPIRATE/BIOPSY;  Esophagogastroduodenoscopy, Endoscopic Ultrasound, with fine needle biopsy aspirate;  Surgeon: Patrick Robles MD;  Location: [facility] OR     GI SURGERY  2007    Gamaliel-en Y w cholecystectomy     GYN SURGERY  89,1/10/92    2 c-sections     HYSTERECTOMY, PAP NO LONGER INDICATED      DeVinci assister lap hyst with BSO     INSERT PORT VASCULAR ACCESS Right 10/4/2017    Procedure: INSERT PORT VASCULAR ACCESS;  Port Placement;  Surgeon: Jc Goodman PA-C;  Location: [facility]     IRRIGATION AND DEBRIDEMENT BREAST  3/1/2012    Procedure:IRRIGATION AND DEBRIDEMENT BREAST; Irrigation and Debridement, Wound Closure Right Breast; Surgeon:JUNG GUILLEN; Location:[facility] OR     MASTECTOMY, RECONSTRUCT BREAST, COMBINED  2012    Procedure:COMBINED MASTECTOMY, RECONSTRUCT BREAST; Bilateral Mastectomies, Right Axillary [place]  Node Biopsy, Bilateral Breast Reconstruction with Tissue Expanders, Reconstruction of inframammary fold, bilateral pain management systems; Surgeon:ANUPAM CAMPBELL; Location:[facility] OR     RECONSTRUCT BREAST  8/31/2012    Procedure: RECONSTRUCT BREAST;  Bilateral 2nd stage breast reconstruction, revision,       SOFT TISSUE SURGERY  1-30-12    Mastectomy w severe myofascial pain syndrome     THYROIDECTOMY  7/10/2013    Procedure: THYROIDECTOMY;  Total Thyroidectomy with central neck dissection;  Surgeon: Indiana Sneed MD;  Location: [facility] OR     TONSILLECTOMY      childhood       Prior to Admission Medications   Prior to Admission Medications   Prescriptions Last Dose Informant Patient Reported? Taking?   ACAI PO  Self Yes No   Sig: Take 1,000 mg by mouth 2 times daily   COMPOUND CONTAINING CONTROLLED SUBSTANCE (CMPD RX) - PHARMACY TO MIX COMPOUNDED MEDICATION   No No   Sig: Apply small amount to affected area two times daily.   Cholecalciferol (VITAMIN D3) 2000 UNITS CAPS  Self Yes No   Sig: Take 10,000 Units by mouth daily   Colchicine 0.6 MG CAPS  Self No No   Sig: Take 0.6 mg by mouth 3 times daily for costochondritis (chest) discomfort. Scale back use to prn with loose stools or bloating.   Digestive Enzymes (BETAINE HCL PO)  Self Yes No   Sig: Take 2 tablets by mouth as needed Betaine HCL and Pepsin: Take 1-7 tabs by mouth daily, if burning take one tablet less once daily.  Betaine HCL 1.04 g  Pepsin 40 mg   DiphenhydrAMINE HCl (BENADRYL PO)  Self Yes No   Sig: Take 50 mg by mouth as needed   EPINEPHrine (EPIPEN 2-CARIDAD) 0.3 MG/0.3ML injection  Self No No   Sig: Inject 0.3 mLs (0.3 mg) into the muscle once as needed for anaphylaxis   Lidocaine HCl Monohydrate POWD  Self Yes No   MAGNESIUM CITRATE PO  Self Yes No   Sig: Take 400 mg by mouth daily   NASALCROM 5.2 MG/ACT AERS Inhaler  Self No No   Sig: SPRAY ONE SPRAY( 1 ML) IN NOSTRIL DAILY   Omega-3 Fatty Acids (OMEGA 3 PO)  Self Yes No   Sig: Take 5 mLs by mouth    Probiotic Product (PROBIOTIC DAILY PO)  Self Yes No   Sig: Take 1 capsule by mouth daily Lacto acid bifidobacterium   SUMAtriptan (IMITREX) 50 MG tablet  Self No No   Sig: Take 1 tablet (50 mg) by mouth at onset of headache for migraine (may repeat in 2 hours as needed, max 2 tablets daily)   Triamcinolone Acetonide (AZMACORT IN)  Self Yes No   Sig: Inhale 2 puffs into the lungs as needed   UNABLE TO FIND  Self Yes No   Si tablet 3 times daily MEDICATION NAME: Digestzymes   UNABLE TO FIND  Self Yes No   Si tablet daily MEDICATION NAME: Pure Encapsulations   UNABLE TO FIND  Self Yes No   Si tablet 3 times daily MEDICATION NAME: calcium D-Glucarate   UNABLE TO FIND  Self Yes No   Si tablets 3 times daily MEDICATION NAME: Natural D-Hist   UNABLE TO FIND  Self Yes No   Sig: MEDICATION NAME: Tumeric 3 capsules daily   VENTOLIN  (90 BASE) MCG/ACT Inhaler  Self No No   Sig: INHALE 2 PUFFS INTO THE LUNGS EVERY 4 HOURS AS NEEDED FOR SHORTNESS OF BREATH OR DIFFICULT BREATHING OR WHEEZING   aluminum chloride (DRYSOL) 20 % external solution  Self No No   Sig: Apply topically At Bedtime   calcitRIOL (ROCALTROL) 0.25 MCG capsule  Self No No   Si tabs in am and 1 tab qhs   calcium carbonate (TUMS) 500 MG chewable tablet  Self Yes No   Sig: Take 2 tablets (1,000 mg) by mouth nightly as needed for other (cramps)   calcium citrate-vitamin D (CALCIUM CITRATE +D) 315-250 MG-UNIT TABS  Self Yes No   Sig: Take 2 tablets by mouth 3 times daily   celecoxib (CELEBREX) 200 MG capsule  Self No No   Sig: Take 1 capsule (200 mg) by mouth 2 times daily   cetirizine (ZYRTEC ALLERGY) 10 MG tablet  Self Yes No   Sig: Take 1 tablet (10 mg) by mouth 3 times daily On hold for lab test.   chlorhexidine (PERIDEX) 0.12 % solution  Self Yes No   cromolyn (OPTICROM) 4 % ophthalmic solution  Self No No   Sig: Place 1 drop into both eyes 4 times daily   cyclobenzaprine (FLEXERIL) 10 MG tablet  Self Yes No   Sig: Take 10 mg by  mouth 3 times daily as needed On hold Dr Monsalve   dexamethasone (DECADRON) 4 MG tablet   No No   Sig: Take 1 tablet (4 mg) by mouth 2 times daily (with meals) Or as directed by MD   diazepam (VALIUM) 5 MG tablet  Self No No   Sig: Take 1 tablet (5 mg) by mouth 3 times daily as needed For muscle spasms   diclofenac (VOLTAREN) 1 % GEL  Self No No   Sig: Apply affected area two times daily PRN using enclosed dosing card.   docusate sodium 100 MG tablet  Self No No   Sig: Take 100 mg by mouth daily   hydrochlorothiazide (MICROZIDE) 12.5 MG capsule  Self No No   Sig: Take 1 capsule (12.5 mg) by mouth daily   levothyroxine (SYNTHROID/LEVOTHROID) 112 MCG tablet  Self No No   Sig: Mon-Sat: (2 tabs daily) Sunday: 3 tabs   lidocaine (XYLOCAINE) 5 % ointment  Self No No   Sig: [facility] TOPICALLY QID PRN   lidocaine-prilocaine (EMLA) cream   No No   Sig: Apply topically as needed (port access pain.)   methocarbamol (ROBAXIN) 750 MG tablet  Self No No   Sig: Take 1 tablet (750 mg) by mouth 4 times daily as needed . Ok to take a 5th Robaxin on very severe days.   mometasone (ASMANEX 30 METERED DOSES) 110 MCG/INH inhaler  Self No No   Sig: Inhale 1 puff into the lungs daily   montelukast (SINGULAIR) 10 MG tablet  Self No No   Sig: TAKE TWO TABLETS BY MOUTH TWICE DAILY   morphine (MS CONTIN) 30 MG 12 hr tablet   No No   Sig: Take 1 tablet (30 mg) by mouth every 8 hours . Take an addition pill at night such that your evening dose is 60mg   ondansetron (ZOFRAN-ODT) 8 MG ODT tab   No No   Sig: Take 1 tablet (8 mg) by mouth every 8 hours as needed for nausea or vomiting   order for DME   No No   Sig: Equipment being ordered: compression stockings. 20-30 mm or 30 - 40 mm as patient can tolerate. Physical therapy to determine if they should be above or below the knee.   order for DME   No No   Sig: Equipment being ordered: compression bra   oxyCODONE (ROXICODONE) 10 MG IR tablet   No No   Sig: Take 1.5-2 tablets (15-20 mg) by mouth every 4  hours as needed   polyethylene glycol (MIRALAX) powder  Self No No   Sig: Take 17 g by mouth daily   potassium chloride SA (POTASSIUM CHLORIDE) 20 MEQ CR tablet  Self No No   Sig: Take 1 tablet (20 mEq) by mouth daily   ranitidine (ZANTAC) 75 MG tablet  Self No No   Sig: Take 1 tablet (75 mg) by mouth 3 times daily   tacrolimus (PROTOPIC) 0.1 % ointment  Self No No   Sig: Apply topically 2 times daily   tamoxifen (NOLVADEX) 20 MG tablet  Self No No   Sig: Take 1 tablet (20 mg) by mouth daily   triamcinolone (KENALOG) 0.025 % ointment  Self No No   Sig: Apply topically 2 times daily Mix with 1 lb jar of vaseline or aquaphor      Facility-Administered Medications: None     Allergies   Allergies   Allergen Reactions     Baclofen Other (See Comments) and Unknown     Other reaction(s): Edema, chest pain, seizures.      No Clinical Screening - See Comments Shortness Of Breath, Palpitations, Anaphylaxis, Itching, Swelling, Difficulty breathing and Rash     Sukhjinder wipes- oral allergy -  July 2015: throat tightness from a Chinese herbal medicine Wilmer Barry Tran     Serotonin Anxiety, Other (See Comments) and Swelling     Seizures      Amitriptyline Hcl Swelling     Birch Trees      Potatoes, carrots, cherries, celery, apple, pears, plums, peaches, parsnip, kiwi, hazelnuts, and apricots,      Blue Dyes Itching     Headaches       Cymbalta Other (See Comments)     Flushing, tremor/muscle twitching and edema     Gabapentin Other (See Comments)     edema  Systemic edema, weaned off from Feb to March per Dr. Dowd.    edema     Grass      Mugwort [Artemisia Vulgaris]      Various spices     Ragweeds      Melons, bananas, cucumbers, zucchini.     Topamax      Nortriptyline Itching, Visual Disturbance, Swelling, GI Disturbance, Anxiety, Other (See Comments) and Nausea     Other reaction(s): Swelling       Social History   I have reviewed this patient's social history and updated it with pertinent information if needed.  Tisha Arias  reports that she has never smoked. She has never used smokeless tobacco. She reports that she drinks alcohol. She reports that she does not use illicit drugs.    Family History   I have reviewed this patient's family history and updated it with pertinent information if needed.   Family History   Problem Relation Age of Onset     Allergies Mother      Arthritis Mother      CANCER Mother      uterine/uterine     Hypertension Mother      on HCTZ     Hyperlipidemia Mother      CEREBROVASCULAR DISEASE Mother      s/p brain surgery     Breast Cancer Mother      Depression Mother      Anxiety Disorder Mother      Anesthesia Reaction Mother      Asthma Mother      Skin Cancer Mother      HEART DISEASE Father      DIABETES Father      Coronary Artery Disease Father      Hypertension Father      Hyperlipidemia Father      CEREBROVASCULAR DISEASE Father      Multiple strokes     Obesity Father      DIABETES Brother      Depression Brother      Hypertension Brother      CANCER Maternal Grandfather      pancreatic cancer/stomach cancer     Prostate Cancer Maternal Grandfather      Prostrate and Bladder     Other Cancer Maternal Grandfather      Pancreatic 1988, Bladder 1977     CANCER Maternal Grandmother      uterine     Breast Cancer Maternal Grandmother      Skin Cancer Maternal Grandmother      CANCER Brother 57     pancreatic cancer     DIABETES Brother      Breast Cancer Cousin      Grand mothers sister     Other Cancer Brother      Stage 3 Pancreatic 2-2015     Depression Brother      Asthma Brother      Obesity Brother      Anesthesia Reaction Other      H/a, itching, drug interactions     Asthma Other      CANCER Brother      Pancreatic      Thyroid Disease No family hx of        Review of Systems   The 10 point Review of Systems is negative other than noted in the HPI or here.     Physical Exam   Temp: 98.7  F (37.1  C) Temp src: Oral BP: 149/74 Pulse: 116   Resp: 18        Vital Signs with  Ranges  Temp:  [98.7  F (37.1  C)] 98.7  F (37.1  C)  Pulse:  [116] 116  Resp:  [18] 18  BP: (149)/(74) 149/74  187 lbs 3.2 oz    Constitutional: Pleasant female seen laying in bed. No apparent distress, and appears stated age.  Eyes: Lids and lashes normal, sclera clear, conjunctiva normal.  ENT: Normocephalic, oral pharynx with moist mucus membranes, tonsils without erythema or exudates, gums normal and good dentition.   Respiratory: No increased work of breathing, good air exchange, clear to auscultation bilaterally, no crackles or wheezing.  Cardiovascular: Regular rate and rhythm, normal S1 and S2, and no murmur noted.  GI: No masses or scars. +BS. Soft. Tenderness on palpation to LUQ.  Skin: BMBx site c/d/i. No bruising or bleeding, no redness, warmth, or swelling, no rashes, no lesions, no jaundice.  Extremities: There is no redness, warmth, or swelling of the joints. No lower extremity edema. No cyanosis.  Neurologic: Awake, alert, oriented to name, place and time.    Vascular access: Port, c/d/i      Data   ROUTINE IP LABS (Last four results)  BMP  Recent Labs  Lab 10/06/17  1050 10/02/17  0919     --    POTASSIUM 3.8 3.6   CHLORIDE 101  --    ELMO 8.0*  --    CO2 30  --    BUN 19  --    CR 0.77  --    *  --      CBC  Recent Labs  Lab 10/06/17  1050 10/04/17  1245 10/02/17  0919   WBC 9.1  --  7.7   RBC 4.56  --  4.38   HGB 11.2*  --  10.7*   HCT 37.1 35.0 35.1   MCV 81  --  80   MCH 24.6*  --  24.4*   MCHC 30.2*  --  30.5*   RDW 15.3*  --  14.5     --  214     INR  Recent Labs  Lab 10/02/17  0919   INR 1.14       Patient has been seen, examined and evaluated by me independently. I have reviewed today's vital signs, medications, labs and imaging results. I have discussed the plan with the patient.  PE  Alert, oriented x3  In no acute distress  Vitals are stable  CVS and Pulmonary systems findings are normal    Labs  CBC and chemistry are not significant  Lab Results   Component Value Date     WBC 9.1 10/06/2017     Lab Results   Component Value Date    RBC 4.56 10/06/2017     Lab Results   Component Value Date    HGB 11.2 10/06/2017     Lab Results   Component Value Date    HCT 37.1 10/06/2017     No components found for: MCT  Lab Results   Component Value Date    MCV 81 10/06/2017     Lab Results   Component Value Date    MCH 24.6 10/06/2017     Lab Results   Component Value Date    MCHC 30.2 10/06/2017     Lab Results   Component Value Date    RDW 15.3 10/06/2017     Lab Results   Component Value Date     10/06/2017     Last Basic Metabolic Panel:  Lab Results   Component Value Date     10/06/2017      Lab Results   Component Value Date    POTASSIUM 3.8 10/06/2017     Lab Results   Component Value Date    CHLORIDE 101 10/06/2017     Lab Results   Component Value Date    ELMO 8.0 10/06/2017     Lab Results   Component Value Date    CO2 30 10/06/2017     Lab Results   Component Value Date    BUN 19 10/06/2017     Lab Results   Component Value Date    CR 0.77 10/06/2017     Lab Results   Component Value Date     10/06/2017     Newly diagnosed DLBCL, Dr. Sauceda opted to treat with R-EPOCH  Admitted for first cycle  I discussed about 30 minutes prognosis, side effects of each drug (cardiac, peripheral neuropathy, cytopenias) and long-term inducing secondary cancers.    She has alos h/o 2 solid tumors , treated and active being treated for breast CA    No active infection  No active issues to prevetn starting chemo, so chemo will be proceeded.  We also dicussed IT chemo need, why, potential side effects, inclusing headaches.    Preeti Guzmna MD

## 2017-10-06 NOTE — IP AVS SNAPSHOT
[address] [address]    Phone:  [phone]                                       After Visit Summary   10/6/2017    Tisha Arias    MRN: [id number]           After Visit Summary Signature Page     I have received my discharge instructions, and my questions have been answered. I have discussed any challenges I see with this plan with the nurse or doctor.    ..........................................................................................................................................  Patient/Patient Representative Signature      ..........................................................................................................................................  Patient Representative Print Name and Relationship to Patient    ..................................................               ................................................  Date                                            Time    ..........................................................................................................................................  Reviewed by Signature/Title    ...................................................              ..............................................  Date                                                            Time

## 2017-10-06 NOTE — IP AVS SNAPSHOT
MRN:[id number]                      After Visit Summary   10/6/2017    Tisha Arias    MRN: [id number]           Thank you!     Thank you for choosing [facility] for your care. Our goal is always to provide you with excellent care. Hearing back from our patients is one way we can continue to improve our services. Please take a few minutes to complete the written survey that you may receive in the mail after you visit with us. Thank you!        Patient Information     Date Of Birth          1960        Designated Caregiver       Most Recent Value    Caregiver    Will someone help with your care after discharge? yes    Name of designated caregiver Yomi    Phone number of caregiver [phone]    Caregiver address same address      About your hospital stay     You were admitted on:  October 6, 2017 You last received care in the:  [address] [facility]    You were discharged on:  October 11, 2017        Reason for your hospital stay       Elvin was admitted for Cycle 1 DA R EPOCH.  On D4 had rigors during chemotherapy.   Required additional decadron & benadryl & re-initiation of chemotherapy at a slower rate.                  Who to Call     For medical emergencies, please call 911.  For non-urgent questions about your medical care, please call your primary care provider or clinic, [phone]          Attending Provider     Provider Specialty    Preeti Guzman MD Internal Medicine       Primary Care Provider Office Phone # Fax #    Shahla Crawley [phone]       When to contact your care team       Please call the [facility] Triage RN Line [phone] ([facility] RN available M-F 8-5, after 5 pm the RN Advisor will page the On-Call Oncology Fellow who will return your call) for temperature greater than 100.4 F, uncontrolled nausea/vomiting/diarrhea or unrelieved constipation, pain not relieved by medications, bleeding not stopped by pressure, dizziness, chest  pain, shortness of breath, changes in level of consciousness, or any other new concerning symptoms.                  After Care Instructions     Activity       Your activity upon discharge: regular activity as tolerated            Diet       Follow this diet upon discharge: regular as tolerated                  Follow-up Appointments     Adult [facility] Follow-up and recommended labs and tests       - 1:15 pm for neulasta (10/12)  - labs 10/17  - labs 10/19 (with SANJANA & possible transfusion)      Appointments on [facility] and/or [facility] (with [facility] or [facility] provider or service). Call [phone] if you haven't heard regarding these appointments within 7 days of discharge.                  Your next 10 appointments already scheduled     Oct 12, 2017  1:30 PM CDT   (Arrive by 1:15 PM)   INJECTION with  Oncology Injection Nurse   [facility] ([facility])    [address] [place] [address]   [phone]            Oct 17, 2017  9:15 AM CDT   [facility] Lab Draw with  [facility] LAB DRAW   [facility] Lab Draw ([facility])    [address] [place] [address]   [phone]            Oct 19, 2017 11:30 AM CDT   [facility] Lab Draw with  [facility] LAB DRAW   [facility])    [address] [place] [address]   [phone]            Oct 19, 2017 12:00 PM CDT   Infusion 180 with UC ONCOLOGY INFUSION, UC 16 ATC   [facility] ([facility])    [address] [place] [address]   [phone]            Oct 19, 2017  1:40 PM CDT   (Arrive by 1:25 PM)   Return Visit with COTY Jessica   [facility] ([facility])    [address] [place] [address]   [phone]            Nov 02,  2017  5:00 PM CDT   US HEAD NECK SOFT TISSUE with UCUS3   [facility] US ([facility])    [address] [place] [address]  1st [facility] [id number] [phone]           Please bring a list of your medicines (including vitamins, minerals and over-the-counter drugs). Also, tell your doctor about any allergies you may have. Wear comfortable clothes and leave your valuables at home.  You do not need to do anything special to prepare for your exam.  Please call the Imaging Department at your exam site with any questions.            Nov 02, 2017  6:00 PM CDT   LAB with  LAB   [facility] ([facility])    [address] [place] [id number] [phone]           Patient must bring picture ID. Patient should be prepared to give a urine specimen  Please do not eat 10-12 hours before your appointment if you are coming in fasting for labs on lipids, cholesterol, or glucose (sugar). Pregnant women should follow their Care Team instructions. Water with medications is okay. Do not drink coffee or other fluids. If you have concerns about taking  your medications, please ask at office or if scheduling via [employer], send a message by clicking on Secure Messaging, Message Your Care Team.            Nov 13, 2017  4:30 PM CST   (Arrive by 4:15 PM)   RETURN ENDOCRINE with Avelina Castro MD   [facility] ([facility])    [address] [place] [address]  [address] [facility] [id number] [phone]            Nov 27, 2017  4:00 PM CST   (Arrive by 3:45 PM)   Return Visit with Shahla Crawley MD   [facility] ([facility])    [address] [place] [address]  [address] [facility] [id number] [phone]              Future tests that were ordered for you     Basic metabolic panel           CBC with platelets differential       Last Lab Result: Hemoglobin (g/dL)       Date    "                  Value                 10/11/2017               10.8 (L)         ----------                  Pending Results     Date and Time Order Name Status Description    10/10/2017 2026 Blood culture Preliminary             Statement of Approval     Ordered          10/11/17 1139  I have reviewed and agree with all the recommendations and orders detailed in this document.  EFFECTIVE NOW     Approved and electronically signed by:  Merlene Green APRN CNP             Admission Information     Date & Time Provider Department Dept. Phone    10/6/2017 Preeti Guzman MD [address] [facility] [phone]      Your Vitals Were     Blood Pressure Pulse Temperature Respirations Height Weight    134/82 74 98.5  F (36.9  C) (Oral) 18 1.651 m (5' 5\") 87.9 kg (193 lb 12.6 oz)    Pulse Oximetry BMI (Body Mass Index)                96% 32.25 kg/m2          MyChart Information     [employer] gives you secure access to your electronic health record. If you see a primary care provider, you can also send messages to your care team and make appointments. If you have questions, please call your primary care clinic.  If you do not have a primary care provider, please call [phone] and they will assist you.        Care EveryWhere ID     This is your Care EveryWhere ID. This could be used by other organizations to access your [facility] medical records  [id number]        Equal Access to Services     JENAE ZAMORA : Hadtimothy Venegas, waaxda luqadaha, qaybta kaalmaranjan brown. So [facility] [phone].    ATENCIÓN: Si habla español, tiene a stiles disposición servicios gratuitos de asistencia lingüística. Llame al [phone].    We comply with applicable federal civil rights laws and [place] laws. We do not discriminate on the basis of race, color, national origin, age, disability, sex, sexual orientation, or gender identity.               Review of your medicines "      START taking        Dose / Directions    acyclovir 400 MG tablet   Commonly known as:  ZOVIRAX   Indication:  Prophylaxis of Herpes Simplex   Used for:  High grade B-cell lymphoma (H)        Dose:  400 mg   Start taking on:  10/12/2017   Take 1 tablet (400 mg) by mouth 2 times daily   Quantity:  30 tablet   Refills:  0       allopurinol 300 MG tablet   Commonly known as:  ZYLOPRIM   Used for:  High grade B-cell lymphoma (H)        Dose:  300 mg   Take 1 tablet (300 mg) by mouth daily   Quantity:  30 tablet   Refills:  0       fluconazole 200 MG tablet   Commonly known as:  DIFLUCAN   Indication:  Fungal Infection Prophylaxis   Used for:  High grade B-cell lymphoma (H)        Dose:  200 mg   Start taking on:  10/12/2017   Take 1 tablet (200 mg) by mouth daily   Quantity:  30 tablet   Refills:  0       levofloxacin 250 MG tablet   Commonly known as:  LEVAQUIN   Indication:  bacterial ppx   Used for:  High grade B-cell lymphoma (H)        Dose:  250 mg   Start taking on:  10/12/2017   Take 1 tablet (250 mg) by mouth daily   Quantity:  14 tablet   Refills:  0       predniSONE 20 MG tablet   Commonly known as:  DELTASONE   Used for:  High grade B-cell lymphoma (H)        Dose:  30 mg/m2   Take 3 tablets (60 mg) by mouth 2 times daily for 10 days   Quantity:  60 tablet   Refills:  0         CONTINUE these medicines which may have CHANGED, or have new prescriptions. If we are uncertain of the size of tablets/capsules you have at home, strength may be listed as something that might have changed.        Dose / Directions    * dexamethasone 4 MG tablet   Commonly known as:  DECADRON   This may have changed:  Another medication with the same name was added. Make sure you understand how and when to take each.   Used for:  High grade B-cell lymphoma (H)        Dose:  4 mg   Take 1 tablet (4 mg) by mouth 2 times daily (with meals) for 3 days Or as directed by MD   Quantity:  6 tablet   Refills:  0       * dexamethasone 4 MG  tablet   Commonly known as:  DECADRON   This may have changed:  You were already taking a medication with the same name, and this prescription was added. Make sure you understand how and when to take each.   Used for:  High grade B-cell lymphoma (H)        Dose:  8 mg   Start taking on:  10/12/2017   Take 2 tablets (8 mg) by mouth daily for 2 days   Quantity:  4 tablet   Refills:  0       diphenhydrAMINE 50 MG capsule   Commonly known as:  BENADRYL   This may have changed:    - medication strength  - when to take this  - reasons to take this   Used for:  High grade B-cell lymphoma (H)        Dose:  50 mg   Take 1 capsule (50 mg) by mouth nightly as needed for itching or sleep   Quantity:  56 capsule   Refills:  0       oxyCODONE 30 MG IR tablet   Commonly known as:  ROXICODONE   This may have changed:    - medication strength  - how much to take  - reasons to take this   Used for:  High grade B-cell lymphoma (H)        Dose:  30 mg   Take 1 tablet (30 mg) by mouth every 4 hours as needed for moderate to severe pain   Quantity:  60 tablet   Refills:  0       * Notice:  This list has 2 medication(s) that are the same as other medications prescribed for you. Read the directions carefully, and ask your doctor or other care provider to review them with you.      CONTINUE these medicines which have NOT CHANGED        Dose / Directions    ACAI PO   Used for:  Breast cancer, unspecified laterality, Thyroid cancer (H), Chronic arthralgias of knees and hips, unspecified laterality        Dose:  1000 mg   Take 1,000 mg by mouth 2 times daily   Refills:  0       aluminum chloride 20 % external solution   Commonly known as:  DRYSOL   Used for:  Rash, Intertrigo        Apply topically At Bedtime   Quantity:  60 mL   Refills:  3       AZMACORT IN        Dose:  2 puff   Inhale 2 puffs into the lungs as needed   Refills:  0       BETAINE HCL PO        Dose:  2 tablet   Take 2 tablets by mouth as needed Betaine HCL and Pepsin: Take  1-7 tabs by mouth daily, if burning take one tablet less once daily. Betaine HCL 1.04 g Pepsin 40 mg   Refills:  0       calcitRIOL 0.25 MCG capsule   Commonly known as:  ROCALTROL   Used for:  Postsurgical hypothyroidism, Papillary carcinoma, follicular variant (H), Metastasis to cervical lymph node (H)        2 tabs in am and 1 tab qhs   Quantity:  270 capsule   Refills:  3       CALCIUM CITRATE +D 315-250 MG-UNIT Tabs per tablet   Generic drug:  calcium citrate-vitamin D        Dose:  2 tablet   Take 2 tablets by mouth 3 times daily   Quantity:  120 tablet   Refills:  0       celecoxib 200 MG capsule   Commonly known as:  celeBREX   Used for:  Chest wall pain        Dose:  200 mg   Take 1 capsule (200 mg) by mouth 2 times daily   Quantity:  180 capsule   Refills:  0       cetirizine 10 MG tablet   Commonly known as:  ZYRTEC ALLERGY   Used for:  High grade B-cell lymphoma (H)        Dose:  10 mg   Take 1 tablet (10 mg) by mouth 3 times daily On hold for lab test.   Quantity:  30 tablet   Refills:  0       chlorhexidine 0.12 % solution   Commonly known as:  PERIDEX        Refills:  0       COMPOUND CONTAINING CONTROLLED SUBSTANCE - PHARMACY TO MIX COMPOUNDED MEDICATION   Commonly known as:  CMPD RX   Used for:  Neoplasm related pain        Apply small amount to affected area two times daily.   Quantity:  120 g   Refills:  6       cromolyn 4 % ophthalmic solution   Commonly known as:  OPTICROM   Used for:  Idiopathic mast cell activation syndrome (H)        Dose:  1 drop   Place 1 drop into both eyes 4 times daily   Quantity:  10 mL   Refills:  5       cyclobenzaprine 10 MG tablet   Commonly known as:  FLEXERIL   Used for:  High grade B-cell lymphoma (H)        Dose:  10 mg   Take 1 tablet (10 mg) by mouth 3 times daily as needed On hold Dr Monsalve   Quantity:  42 tablet   Refills:  5       diazepam 5 MG tablet   Commonly known as:  VALIUM   Used for:  Chest wall pain        Dose:  5 mg   Take 1 tablet (5 mg) by  mouth 3 times daily as needed For muscle spasms   Quantity:  90 tablet   Refills:  2       diclofenac 1 % Gel topical gel   Commonly known as:  VOLTAREN   Used for:  Myofascial pain        Apply affected area two times daily PRN using enclosed dosing card.   Quantity:  100 g   Refills:  1       docusate sodium 100 MG tablet   Commonly known as:  COLACE   Used for:  Acute constipation        Dose:  100 mg   Take 100 mg by mouth daily   Quantity:  60 tablet   Refills:  1       EPINEPHrine 0.3 MG/0.3ML injection 2-pack   Commonly known as:  EPIPEN 2-CARIDAD        Dose:  0.3 mg   Inject 0.3 mLs (0.3 mg) into the muscle once as needed for anaphylaxis   Quantity:  0.6 mL   Refills:  3       hydrochlorothiazide 12.5 MG capsule   Commonly known as:  MICROZIDE   Used for:  Hypocalcemia        Dose:  12.5 mg   Take 1 capsule (12.5 mg) by mouth daily   Quantity:  90 capsule   Refills:  2       levothyroxine 112 MCG tablet   Commonly known as:  SYNTHROID/LEVOTHROID   Used for:  Postsurgical hypothyroidism, Papillary carcinoma, follicular variant (H), Postsurgical hypoparathyroidism (H), Thyroid cancer (H)        Mon-Sat: (2 tabs daily) Sunday: 3 tabs   Quantity:  189 tablet   Refills:  3       lidocaine 5 % ointment   Commonly known as:  XYLOCAINE   Used for:  Chest wall pain        SANJANA TOPICALLY QID PRN   Quantity:  35.44 g   Refills:  3       Lidocaine HCl Monohydrate Powd        Refills:  0       lidocaine-prilocaine cream   Commonly known as:  EMLA   Used for:  Neoplasm related pain, Chest wall pain        Apply topically as needed (port access pain.)   Quantity:  30 g   Refills:  1       MAGNESIUM CITRATE PO   Indication:  up to 3 times daily        Dose:  400 mg   Take 400 mg by mouth daily   Refills:  0       methocarbamol 750 MG tablet   Commonly known as:  ROBAXIN   Used for:  Myofascial pain        Dose:  750 mg   Take 1 tablet (750 mg) by mouth 4 times daily as needed . Ok to take a 5th Robaxin on very severe days.    Quantity:  360 tablet   Refills:  1       mometasone 110 MCG/INH inhaler   Commonly known as:  ASMANEX 30 METERED DOSES   Used for:  Intermittent asthma, uncomplicated        Dose:  1 puff   Inhale 1 puff into the lungs daily   Quantity:  3 Inhaler   Refills:  3       montelukast 10 MG tablet   Commonly known as:  SINGULAIR   Used for:  Idiopathic mast cell activation syndrome (H)        TAKE TWO TABLETS BY MOUTH TWICE DAILY   Quantity:  120 tablet   Refills:  11       morphine 30 MG 12 hr tablet   Commonly known as:  MS CONTIN   Used for:  Neoplasm related pain        Dose:  30 mg   Take 1 tablet (30 mg) by mouth every 8 hours . Take an addition pill at night such that your evening dose is 60mg   Quantity:  120 tablet   Refills:  0       NASALCROM 5.2 MG/ACT Aers Inhaler   Used for:  Mast cell disease, systemic   Generic drug:  cromolyn sodium        SPRAY ONE SPRAY( 1 ML) IN NOSTRIL DAILY   Quantity:  1 Bottle   Refills:  6       OMEGA 3 PO        Dose:  5 mL   Take 5 mLs by mouth   Refills:  0       ondansetron 8 MG ODT tab   Commonly known as:  ZOFRAN ODT   Used for:  Nausea with vomiting        Dose:  8 mg   Take 1 tablet (8 mg) by mouth every 8 hours as needed for nausea or vomiting   Quantity:  90 tablet   Refills:  3       * order for DME   Used for:  Venous stasis        Equipment being ordered: compression stockings. 20-30 mm or 30 - 40 mm as patient can tolerate. Physical therapy to determine if they should be above or below the knee.   Quantity:  2 Units   Refills:  4       * order for DME   Used for:  Malignant neoplasm of right female breast, unspecified site of breast        Equipment being ordered: compression bra   Quantity:  2 Device   Refills:  1       polyethylene glycol powder   Commonly known as:  MIRALAX   Used for:  Acute constipation        Dose:  1 capful   Take 17 g by mouth daily   Quantity:  510 g   Refills:  1       potassium chloride SA 20 MEQ CR tablet   Commonly known as:  KLOR-CON    Used for:  Hypokalemia        Dose:  20 mEq   Take 1 tablet (20 mEq) by mouth daily   Quantity:  90 tablet   Refills:  3       PROBIOTIC DAILY PO   Used for:  Breast cancer, unspecified laterality, Thyroid cancer (H), Chronic arthralgias of knees and hips, unspecified laterality        Dose:  1 capsule   Take 1 capsule by mouth daily Lacto acid bifidobacterium   Refills:  0       ranitidine 75 MG tablet   Commonly known as:  ZANTAC   Used for:  Mast cell disease, systemic        Dose:  75 mg   Take 1 tablet (75 mg) by mouth 3 times daily   Quantity:  270 tablet   Refills:  3       SUMAtriptan 50 MG tablet   Commonly known as:  IMITREX   Used for:  Migraine without status migrainosus, not intractable, unspecified migraine type        Dose:  50 mg   Take 1 tablet (50 mg) by mouth at onset of headache for migraine (may repeat in 2 hours as needed, max 2 tablets daily)   Quantity:  5 tablet   Refills:  3       tacrolimus 0.1 % ointment   Commonly known as:  PROTOPIC   Used for:  Rash, Intertrigo        Apply topically 2 times daily   Quantity:  60 g   Refills:  3       tamoxifen 20 MG tablet   Commonly known as:  NOLVADEX   Used for:  Malignant neoplasm of female breast, unspecified laterality, unspecified site of breast        Dose:  20 mg   Take 1 tablet (20 mg) by mouth daily   Quantity:  90 tablet   Refills:  3       triamcinolone 0.025 % ointment   Commonly known as:  KENALOG   Used for:  Intertrigo, Rash        Apply topically 2 times daily Mix with 1 lb jar of vaseline or aquaphor   Quantity:  80 g   Refills:  3       TUMS 500 MG chewable tablet   Generic drug:  calcium carbonate        Dose:  1.5 chew tab   Take 2 tablets (1,000 mg) by mouth nightly as needed for other (cramps)   Quantity:  180 chew tab   Refills:  3       * UNABLE TO FIND        Dose:  1 tablet   1 tablet 3 times daily MEDICATION NAME: calcium D-Glucarate   Refills:  0       * UNABLE TO FIND   Indication:  On hold for drug testing   Used for:   Breast cancer, unspecified laterality, Papillary carcinoma, follicular variant (H), Chronic musculoskeletal pain        Dose:  2 tablet   2 tablets 3 times daily MEDICATION NAME: Natural D-Hist   Refills:  0       * UNABLE TO FIND        MEDICATION NAME: Tumeric 3 capsules daily   Refills:  0       * UNABLE TO FIND   Used for:  Thyroid cancer (H), Postsurgical hypothyroidism, Postsurgical hypoparathyroidism (H)        Dose:  1 tablet   1 tablet 3 times daily MEDICATION NAME: Digestzymes   Refills:  0       * UNABLE TO FIND   Indication:  P5P50   Used for:  Thyroid cancer (H), Postsurgical hypothyroidism, Postsurgical hypoparathyroidism (H)        Dose:  1 tablet   1 tablet daily MEDICATION NAME: Pure Encapsulations   Refills:  0       VENTOLIN  (90 BASE) MCG/ACT Inhaler   Used for:  Mild intermittent asthma without complication   Generic drug:  albuterol        INHALE 2 PUFFS INTO THE LUNGS EVERY 4 HOURS AS NEEDED FOR SHORTNESS OF BREATH OR DIFFICULT BREATHING OR WHEEZING   Quantity:  18 g   Refills:  3       vitamin D3 2000 UNITS Caps   Used for:  Thyroid cancer (H), Postsurgical hypothyroidism, Papillary carcinoma, follicular variant (H), Postsurgical hypoparathyroidism (H)        Dose:  76887 Units   Take 10,000 Units by mouth daily   Quantity:  60 capsule   Refills:  4       * Notice:  This list has 7 medication(s) that are the same as other medications prescribed for you. Read the directions carefully, and ask your doctor or other care provider to review them with you.      STOP taking     Colchicine 0.6 MG Caps                Where to get your medicines      These medications were sent to [id number] - [place], [place] - [address] [address] [place]  [address] [address] [place] [address]     Phone:  [phone]     acyclovir 400 MG tablet    allopurinol 300 MG tablet    cetirizine 10 MG tablet    cyclobenzaprine 10 MG tablet    dexamethasone 4 MG tablet    fluconazole 200 MG tablet    levofloxacin  250 MG tablet    methocarbamol 750 MG tablet    predniSONE 20 MG tablet         Some of these will need a paper prescription and others can be bought over the counter. Ask your nurse if you have questions.     Bring a paper prescription for each of these medications     oxyCODONE 30 MG IR tablet       You don't need a prescription for these medications     dexamethasone 4 MG tablet    diphenhydrAMINE 50 MG capsule               ANTIBIOTIC INSTRUCTION     You've Been Prescribed an Antibiotic - Now What?  Your healthcare team thinks that you or your loved one might have an infection. Some infections can be treated with antibiotics, which are powerful, life-saving drugs. Like all medications, antibiotics have side effects and should only be used when necessary. There are some important things you should know about your antibiotic treatment.      Your healthcare team may run tests before you start taking an antibiotic.    Your team may take samples (e.g., from your blood, urine or other areas) to run tests to look for bacteria. These test can be important to determine if you need an antibiotic at all and, if you do, which antibiotic will work best.      Within a few days, your healthcare team might change or even stop your antibiotic.    Your team may start you on an antibiotic while they are working to find out what is making you sick.    Your team might change your antibiotic because test results show that a different antibiotic would be better to treat your infection.    In some cases, once your team has more information, they learn that you do not need an antibiotic at all. They may find out that you don't have an infection, or that the antibiotic you're taking won't work against your infection. For example, an infection caused by a virus can't be treated with antibiotics. Staying on an antibiotic when you don't need it is more likely to be harmful than helpful.      You may experience side effects from your  antibiotic.    Like all medications, antibiotics have side effects. Some of these can be serious.    Let you healthcare team know if you have any known allergies when you are admitted to the hospital.    One significant side effect of nearly all antibiotics is the risk of severe and sometimes deadly diarrhea caused by Clostridium difficile (C. Difficile). This occurs when a person takes antibiotics because some good germs are destroyed. Antibiotic use allows C. diificile to take over, putting patients at high risk for this serious infection.    As a patient or caregiver, it is important to understand your or your loved one's antibiotic treatment. It is especially important for caregivers to speak up when patients can't speak for themselves. Here are some important questions to ask your healthcare team.    What infection is this antibiotic treating and how do you know I have that infection?    What side effects might occur from this antibiotic?    How long will I need to take this antibiotic?    Is it safe to take this antibiotic with other medications or supplements (e.g., vitamins) that I am taking?     Are there any special directions I need to know about taking this antibiotic? For example, should I take it with food?    How will I be monitored to know whether my infection is responding to the antibiotic?    What tests may help to make sure the right antibiotic is prescribed for me?      Information provided by:  www.cdc.gov/getsmart  U.S. Department of Health and Human Services  Centers for disease Control and Prevention  National Center for Emerging and Zoonotic Infectious Diseases  Division of Healthcare Quality Promotion         Protect others around you: Learn how to safely use, store and throw away your medicines at www.disposemymeds.org.             Medication List: This is a list of all your medications and when to take them. Check marks below indicate your daily home schedule. Keep this list as a  reference.      Medications           Morning Afternoon Evening Bedtime As Needed    ACAI PO   Take 1,000 mg by mouth 2 times daily                                acyclovir 400 MG tablet   Commonly known as:  ZOVIRAX   Take 1 tablet (400 mg) by mouth 2 times daily   Start taking on:  10/12/2017                                allopurinol 300 MG tablet   Commonly known as:  ZYLOPRIM   Take 1 tablet (300 mg) by mouth daily   Last time this was given:  300 mg on 10/11/2017  8:39 AM                                aluminum chloride 20 % external solution   Commonly known as:  DRYSOL   Apply topically At Bedtime                                AZMACORT IN   Inhale 2 puffs into the lungs as needed                                BETAINE HCL PO   Take 2 tablets by mouth as needed Betaine HCL and Pepsin: Take 1-7 tabs by mouth daily, if burning take one tablet less once daily. Betaine HCL 1.04 g Pepsin 40 mg                                calcitRIOL 0.25 MCG capsule   Commonly known as:  ROCALTROL   2 tabs in am and 1 tab qhs   Last time this was given:  0.5 mcg on 10/11/2017  8:39 AM                                CALCIUM CITRATE +D 315-250 MG-UNIT Tabs per tablet   Take 2 tablets by mouth 3 times daily   Generic drug:  calcium citrate-vitamin D                                celecoxib 200 MG capsule   Commonly known as:  celeBREX   Take 1 capsule (200 mg) by mouth 2 times daily   Last time this was given:  200 mg on 10/11/2017  8:40 AM                                cetirizine 10 MG tablet   Commonly known as:  ZYRTEC ALLERGY   Take 1 tablet (10 mg) by mouth 3 times daily On hold for lab test.   Last time this was given:  10 mg on 10/11/2017  2:43 PM                                chlorhexidine 0.12 % solution   Commonly known as:  PERIDEX                                COMPOUND CONTAINING CONTROLLED SUBSTANCE - PHARMACY TO MIX COMPOUNDED MEDICATION   Commonly known as:  CMPD RX   Apply small amount to affected area two times  daily.                                cromolyn 4 % ophthalmic solution   Commonly known as:  OPTICROM   Place 1 drop into both eyes 4 times daily   Last time this was given:  1 drop on 10/11/2017  8:42 AM                                cyclobenzaprine 10 MG tablet   Commonly known as:  FLEXERIL   Take 1 tablet (10 mg) by mouth 3 times daily as needed On hold Dr Monsalve   Last time this was given:  10 mg on 10/10/2017  8:50 AM                                * dexamethasone 4 MG tablet   Commonly known as:  DECADRON   Take 1 tablet (4 mg) by mouth 2 times daily (with meals) for 3 days Or as directed by MD                                * dexamethasone 4 MG tablet   Commonly known as:  DECADRON   Take 2 tablets (8 mg) by mouth daily for 2 days   Start taking on:  10/12/2017                                diazepam 5 MG tablet   Commonly known as:  VALIUM   Take 1 tablet (5 mg) by mouth 3 times daily as needed For muscle spasms   Last time this was given:  5 mg on 10/10/2017  5:41 AM                                diclofenac 1 % Gel topical gel   Commonly known as:  VOLTAREN   Apply affected area two times daily PRN using enclosed dosing card.                                diphenhydrAMINE 50 MG capsule   Commonly known as:  BENADRYL   Take 1 capsule (50 mg) by mouth nightly as needed for itching or sleep   Last time this was given:  50 mg on 10/9/2017  9:29 PM                                docusate sodium 100 MG tablet   Commonly known as:  COLACE   Take 100 mg by mouth daily                                EPINEPHrine 0.3 MG/0.3ML injection 2-pack   Commonly known as:  EPIPEN 2-CARIDAD   Inject 0.3 mLs (0.3 mg) into the muscle once as needed for anaphylaxis                                fluconazole 200 MG tablet   Commonly known as:  DIFLUCAN   Take 1 tablet (200 mg) by mouth daily   Start taking on:  10/12/2017                                hydrochlorothiazide 12.5 MG capsule   Commonly known as:  MICROZIDE   Take 1  capsule (12.5 mg) by mouth daily                                levofloxacin 250 MG tablet   Commonly known as:  LEVAQUIN   Take 1 tablet (250 mg) by mouth daily   Start taking on:  10/12/2017                                levothyroxine 112 MCG tablet   Commonly known as:  SYNTHROID/LEVOTHROID   Mon-Sat: (2 tabs daily) Sunday: 3 tabs   Last time this was given:  224 mcg on 10/11/2017  8:38 AM                                lidocaine 5 % ointment   Commonly known as:  XYLOCAINE   SANJANA TOPICALLY QID PRN   Last time this was given:  10/11/2017  9:13 AM                                Lidocaine HCl Monohydrate Powd                                lidocaine-prilocaine cream   Commonly known as:  EMLA   Apply topically as needed (port access pain.)                                MAGNESIUM CITRATE PO   Take 400 mg by mouth daily                                methocarbamol 750 MG tablet   Commonly known as:  ROBAXIN   Take 1 tablet (750 mg) by mouth 4 times daily as needed . Ok to take a 5th Robaxin on very severe days.   Last time this was given:  750 mg on 10/11/2017 12:35 PM                                mometasone 110 MCG/INH inhaler   Commonly known as:  ASMANEX 30 METERED DOSES   Inhale 1 puff into the lungs daily                                montelukast 10 MG tablet   Commonly known as:  SINGULAIR   TAKE TWO TABLETS BY MOUTH TWICE DAILY   Last time this was given:  20 mg on 10/11/2017  8:38 AM                                morphine 30 MG 12 hr tablet   Commonly known as:  MS CONTIN   Take 1 tablet (30 mg) by mouth every 8 hours . Take an addition pill at night such that your evening dose is 60mg   Last time this was given:  30 mg on 10/11/2017  2:43 PM                                NASALCROM 5.2 MG/ACT Aers Inhaler   SPRAY ONE SPRAY( 1 ML) IN NOSTRIL DAILY   Generic drug:  cromolyn sodium                                OMEGA 3 PO   Take 5 mLs by mouth                                ondansetron 8 MG ODT tab    Commonly known as:  ZOFRAN ODT   Take 1 tablet (8 mg) by mouth every 8 hours as needed for nausea or vomiting                                * order for DME   Equipment being ordered: compression stockings. 20-30 mm or 30 - 40 mm as patient can tolerate. Physical therapy to determine if they should be above or below the knee.                                * order for DME   Equipment being ordered: compression bra                                oxyCODONE 30 MG IR tablet   Commonly known as:  ROXICODONE   Take 1 tablet (30 mg) by mouth every 4 hours as needed for moderate to severe pain   Last time this was given:  30 mg on 10/11/2017  1:16 PM                                polyethylene glycol powder   Commonly known as:  MIRALAX   Take 17 g by mouth daily                                potassium chloride SA 20 MEQ CR tablet   Commonly known as:  KLOR-CON   Take 1 tablet (20 mEq) by mouth daily   Last time this was given:  20 mEq on 10/11/2017  8:39 AM                                predniSONE 20 MG tablet   Commonly known as:  DELTASONE   Take 3 tablets (60 mg) by mouth 2 times daily for 10 days   Last time this was given:  60 mg on 10/11/2017  8:39 AM                                PROBIOTIC DAILY PO   Take 1 capsule by mouth daily Lacto acid bifidobacterium                                ranitidine 75 MG tablet   Commonly known as:  ZANTAC   Take 1 tablet (75 mg) by mouth 3 times daily   Last time this was given:  75 mg on 10/11/2017  2:45 PM                                SUMAtriptan 50 MG tablet   Commonly known as:  IMITREX   Take 1 tablet (50 mg) by mouth at onset of headache for migraine (may repeat in 2 hours as needed, max 2 tablets daily)                                tacrolimus 0.1 % ointment   Commonly known as:  PROTOPIC   Apply topically 2 times daily   Last time this was given:  10/11/2017  9:13 AM                                tamoxifen 20 MG tablet   Commonly known as:  NOLVADEX   Take 1 tablet  (20 mg) by mouth daily                                triamcinolone 0.025 % ointment   Commonly known as:  KENALOG   Apply topically 2 times daily Mix with 1 lb jar of vaseline or aquaphor   Last time this was given:  10/11/2017  9:13 AM                                TUMS 500 MG chewable tablet   Take 2 tablets (1,000 mg) by mouth nightly as needed for other (cramps)   Generic drug:  calcium carbonate                                * UNABLE TO FIND   1 tablet 3 times daily MEDICATION NAME: calcium D-Glucarate   Last time this was given:  10/11/2017 12:39 AM                                * UNABLE TO FIND   2 tablets 3 times daily MEDICATION NAME: Natural D-Hist   Last time this was given:  10/11/2017 12:39 AM                                * UNABLE TO FIND   MEDICATION NAME: Tumeric 3 capsules daily   Last time this was given:  10/11/2017 12:39 AM                                * UNABLE TO FIND   1 tablet 3 times daily MEDICATION NAME: Digestzymes   Last time this was given:  10/11/2017 12:39 AM                                * UNABLE TO FIND   1 tablet daily MEDICATION NAME: Pure Encapsulations   Last time this was given:  10/11/2017 12:39 AM                                VENTOLIN  (90 BASE) MCG/ACT Inhaler   INHALE 2 PUFFS INTO THE LUNGS EVERY 4 HOURS AS NEEDED FOR SHORTNESS OF BREATH OR DIFFICULT BREATHING OR WHEEZING   Generic drug:  albuterol                                vitamin D3 2000 UNITS Caps   Take 10,000 Units by mouth daily                                * Notice:  This list has 9 medication(s) that are the same as other medications prescribed for you. Read the directions carefully, and ask your doctor or other care provider to review them with you.

## 2017-10-06 NOTE — PROGRESS NOTES
DATE/TIME  (DOT-TD, DOT-NOW) CHEMO CHECK ACTIVITY (REGIMEN & DOSE CHECK, DAY, DOSE #, NAME OF CHEMO #1)  CHEMO DRUG #2  CHEMO DRUG #3 NAME OF RN #1 (USE DOT-ME HERE) NAME OF RN#2 (2ND RN TO LOG IN SEPARATELY)   10/6/2017  11:33 AM   Protocol double check   Kris Archibald     10/7/2017  9:36 AM   Etoposide Day #1 Vincristine/Doxorubucin Dose #1  Brando Discala   Claudettegardenia García     10/8/2017  10:22 AM   Etoposide Vincristine/Doxorubicin  Brando Discala   (Ludy)   10/9/2017  10:07 AM   Day 3 etoposide Day 3 dox/vinc  Kris Whelan   Viraphet Xaphakdy Chanthavixay     10/9/2017  10:43 AM   IT MTX   Kris Whelan   (Jessica B)   10/10/2017  8:16 AM     Day 4 CIVI etoposide Day 4 CIVI dox/vinc  Kris Whelan   (Elvin)   10/11/2017  1:23 PM   day5 cytoxan dose double check   Gianfranco chang

## 2017-10-06 NOTE — TELEPHONE ENCOUNTER
Spoke Thursday afternoon 10/5    Pain severe    Tolerating meds well    Told her to try up to 30mg of oxycodone at a time

## 2017-10-07 LAB
ALBUMIN SERPL-MCNC: 2.8 G/DL (ref 3.4–5)
ALP SERPL-CCNC: 55 U/L (ref 40–150)
ALT SERPL W P-5'-P-CCNC: 23 U/L (ref 0–50)
ANION GAP SERPL CALCULATED.3IONS-SCNC: 7 MMOL/L (ref 3–14)
AST SERPL W P-5'-P-CCNC: 16 U/L (ref 0–45)
BASOPHILS # BLD AUTO: 0 10E9/L (ref 0–0.2)
BASOPHILS NFR BLD AUTO: 0.2 %
BILIRUB SERPL-MCNC: 0.4 MG/DL (ref 0.2–1.3)
BUN SERPL-MCNC: 17 MG/DL (ref 7–30)
CALCIUM SERPL-MCNC: 7.8 MG/DL (ref 8.5–10.1)
CHLORIDE SERPL-SCNC: 105 MMOL/L (ref 94–109)
CO2 SERPL-SCNC: 29 MMOL/L (ref 20–32)
CREAT SERPL-MCNC: 0.77 MG/DL (ref 0.52–1.04)
DIFFERENTIAL METHOD BLD: ABNORMAL
EOSINOPHIL # BLD AUTO: 0.1 10E9/L (ref 0–0.7)
EOSINOPHIL NFR BLD AUTO: 1.6 %
ERYTHROCYTE [DISTWIDTH] IN BLOOD BY AUTOMATED COUNT: 15.6 % (ref 10–15)
GFR SERPL CREATININE-BSD FRML MDRD: 78 ML/MIN/1.7M2
GLUCOSE SERPL-MCNC: 100 MG/DL (ref 70–99)
HCT VFR BLD AUTO: 32 % (ref 35–47)
HGB BLD-MCNC: 9.4 G/DL (ref 11.7–15.7)
IMM GRANULOCYTES # BLD: 0 10E9/L (ref 0–0.4)
IMM GRANULOCYTES NFR BLD: 0.2 %
LYMPHOCYTES # BLD AUTO: 1.2 10E9/L (ref 0.8–5.3)
LYMPHOCYTES NFR BLD AUTO: 23.3 %
MCH RBC QN AUTO: 24.1 PG (ref 26.5–33)
MCHC RBC AUTO-ENTMCNC: 29.4 G/DL (ref 31.5–36.5)
MCV RBC AUTO: 82 FL (ref 78–100)
MONOCYTES # BLD AUTO: 0.3 10E9/L (ref 0–1.3)
MONOCYTES NFR BLD AUTO: 6 %
NEUTROPHILS # BLD AUTO: 3.4 10E9/L (ref 1.6–8.3)
NEUTROPHILS NFR BLD AUTO: 68.7 %
NRBC # BLD AUTO: 0 10*3/UL
NRBC BLD AUTO-RTO: 0 /100
PLATELET # BLD AUTO: 162 10E9/L (ref 150–450)
POTASSIUM SERPL-SCNC: 3.6 MMOL/L (ref 3.4–5.3)
PROT SERPL-MCNC: 5.5 G/DL (ref 6.8–8.8)
RBC # BLD AUTO: 3.9 10E12/L (ref 3.8–5.2)
SODIUM SERPL-SCNC: 142 MMOL/L (ref 133–144)
WBC # BLD AUTO: 5 10E9/L (ref 4–11)

## 2017-10-07 PROCEDURE — 80053 COMPREHEN METABOLIC PANEL: CPT | Performed by: PHYSICIAN ASSISTANT

## 2017-10-07 PROCEDURE — S0169 CALCITROL: HCPCS | Performed by: INTERNAL MEDICINE

## 2017-10-07 PROCEDURE — 25000131 ZZH RX MED GY IP 250 OP 636 PS 637

## 2017-10-07 PROCEDURE — 25000128 H RX IP 250 OP 636: Performed by: PHYSICIAN ASSISTANT

## 2017-10-07 PROCEDURE — 25000125 ZZHC RX 250

## 2017-10-07 PROCEDURE — 25000132 ZZH RX MED GY IP 250 OP 250 PS 637: Performed by: INTERNAL MEDICINE

## 2017-10-07 PROCEDURE — 25000132 ZZH RX MED GY IP 250 OP 250 PS 637: Performed by: PHYSICIAN ASSISTANT

## 2017-10-07 PROCEDURE — 25000132 ZZH RX MED GY IP 250 OP 250 PS 637: Performed by: DERMATOLOGY

## 2017-10-07 PROCEDURE — 3E04305 INTRODUCTION OF OTHER ANTINEOPLASTIC INTO CENTRAL VEIN, PERCUTANEOUS APPROACH: ICD-10-PCS

## 2017-10-07 PROCEDURE — 3E0430M INTRODUCTION OF MONOCLONAL ANTIBODY INTO CENTRAL VEIN, PERCUTANEOUS APPROACH: ICD-10-PCS

## 2017-10-07 PROCEDURE — 12000008 ZZH R&B INTERMEDIATE UMMC

## 2017-10-07 PROCEDURE — 85025 COMPLETE CBC W/AUTO DIFF WBC: CPT | Performed by: PHYSICIAN ASSISTANT

## 2017-10-07 PROCEDURE — 25000128 H RX IP 250 OP 636

## 2017-10-07 PROCEDURE — 25000132 ZZH RX MED GY IP 250 OP 250 PS 637

## 2017-10-07 RX ORDER — LEVOTHYROXINE SODIUM 112 UG/1
224 TABLET ORAL DAILY
Status: DISCONTINUED | OUTPATIENT
Start: 2017-10-08 | End: 2017-10-11 | Stop reason: HOSPADM

## 2017-10-07 RX ADMIN — METHOCARBAMOL 750 MG: 750 TABLET ORAL at 16:17

## 2017-10-07 RX ADMIN — METHOCARBAMOL 750 MG: 750 TABLET ORAL at 20:26

## 2017-10-07 RX ADMIN — OXYCODONE HYDROCHLORIDE 30 MG: 30 TABLET ORAL at 10:34

## 2017-10-07 RX ADMIN — DIAZEPAM 5 MG: 5 TABLET ORAL at 12:14

## 2017-10-07 RX ADMIN — LIDOCAINE: 50 OINTMENT TOPICAL at 09:41

## 2017-10-07 RX ADMIN — CROMOLYN SODIUM 1 DROP: 40 SOLUTION/ DROPS OPHTHALMIC at 20:27

## 2017-10-07 RX ADMIN — MORPHINE SULFATE 60 MG: 60 TABLET, EXTENDED RELEASE ORAL at 20:25

## 2017-10-07 RX ADMIN — POTASSIUM CHLORIDE 20 MEQ: 750 TABLET, EXTENDED RELEASE ORAL at 08:54

## 2017-10-07 RX ADMIN — ALLOPURINOL 300 MG: 300 TABLET ORAL at 08:54

## 2017-10-07 RX ADMIN — CETIRIZINE HYDROCHLORIDE 10 MG: 10 TABLET, FILM COATED ORAL at 14:45

## 2017-10-07 RX ADMIN — RANITIDINE 75 MG: 75 TABLET, FILM COATED ORAL at 20:26

## 2017-10-07 RX ADMIN — CALCITRIOL 0.5 MCG: 0.5 CAPSULE, LIQUID FILLED ORAL at 08:52

## 2017-10-07 RX ADMIN — RANITIDINE 75 MG: 75 TABLET, FILM COATED ORAL at 16:21

## 2017-10-07 RX ADMIN — CETIRIZINE HYDROCHLORIDE 10 MG: 10 TABLET, FILM COATED ORAL at 20:26

## 2017-10-07 RX ADMIN — CALCITRIOL 0.25 MCG: 0.25 CAPSULE, LIQUID FILLED ORAL at 20:26

## 2017-10-07 RX ADMIN — DIPHENHYDRAMINE HYDROCHLORIDE 50 MG: 50 CAPSULE ORAL at 00:30

## 2017-10-07 RX ADMIN — OXYCODONE HYDROCHLORIDE 30 MG: 30 TABLET ORAL at 14:45

## 2017-10-07 RX ADMIN — CROMOLYN SODIUM 1 DROP: 40 SOLUTION/ DROPS OPHTHALMIC at 16:16

## 2017-10-07 RX ADMIN — ENOXAPARIN SODIUM 40 MG: 40 INJECTION SUBCUTANEOUS at 14:45

## 2017-10-07 RX ADMIN — SENNOSIDES AND DOCUSATE SODIUM 2 TABLET: 8.6; 5 TABLET ORAL at 20:26

## 2017-10-07 RX ADMIN — MORPHINE SULFATE 30 MG: 30 TABLET, EXTENDED RELEASE ORAL at 14:45

## 2017-10-07 RX ADMIN — METHOCARBAMOL 750 MG: 750 TABLET ORAL at 08:52

## 2017-10-07 RX ADMIN — DIAZEPAM 5 MG: 5 TABLET ORAL at 20:26

## 2017-10-07 RX ADMIN — PREDNISONE 60 MG: 50 TABLET ORAL at 20:26

## 2017-10-07 RX ADMIN — HYDROCHLOROTHIAZIDE 12.5 MG: 12.5 TABLET ORAL at 08:53

## 2017-10-07 RX ADMIN — OXYCODONE HYDROCHLORIDE 30 MG: 30 TABLET ORAL at 18:44

## 2017-10-07 RX ADMIN — MONTELUKAST SODIUM 20 MG: 10 TABLET, FILM COATED ORAL at 20:26

## 2017-10-07 RX ADMIN — PREDNISONE 60 MG: 50 TABLET ORAL at 08:58

## 2017-10-07 RX ADMIN — METHOCARBAMOL 750 MG: 750 TABLET ORAL at 12:14

## 2017-10-07 RX ADMIN — CETIRIZINE HYDROCHLORIDE 10 MG: 10 TABLET, FILM COATED ORAL at 08:58

## 2017-10-07 RX ADMIN — SODIUM CHLORIDE: 9 INJECTION, SOLUTION INTRAVENOUS at 10:38

## 2017-10-07 RX ADMIN — ETOPOSIDE 100 MG: 20 INJECTION, SOLUTION, CONCENTRATE INTRAVENOUS at 09:45

## 2017-10-07 RX ADMIN — CROMOLYN SODIUM 1 DROP: 40 SOLUTION/ DROPS OPHTHALMIC at 12:14

## 2017-10-07 RX ADMIN — CROMOLYN SODIUM 1 DROP: 40 SOLUTION/ DROPS OPHTHALMIC at 08:54

## 2017-10-07 RX ADMIN — LEVOTHYROXINE SODIUM 112 MCG: 112 TABLET ORAL at 08:55

## 2017-10-07 RX ADMIN — DOXORUBICIN HYDROCHLORIDE 0.8 MG: 2 INJECTION, SOLUTION INTRAVENOUS at 09:44

## 2017-10-07 RX ADMIN — OXYCODONE HYDROCHLORIDE 30 MG: 30 TABLET ORAL at 06:34

## 2017-10-07 RX ADMIN — ONDANSETRON HYDROCHLORIDE 16 MG: 8 TABLET, FILM COATED ORAL at 08:55

## 2017-10-07 RX ADMIN — MONTELUKAST SODIUM 20 MG: 10 TABLET, FILM COATED ORAL at 08:56

## 2017-10-07 RX ADMIN — OXYCODONE HYDROCHLORIDE 30 MG: 30 TABLET ORAL at 22:47

## 2017-10-07 RX ADMIN — RANITIDINE 75 MG: 75 TABLET, FILM COATED ORAL at 08:51

## 2017-10-07 RX ADMIN — DIPHENHYDRAMINE HYDROCHLORIDE 50 MG: 50 CAPSULE ORAL at 22:47

## 2017-10-07 RX ADMIN — CELECOXIB 200 MG: 200 CAPSULE ORAL at 20:26

## 2017-10-07 RX ADMIN — SENNOSIDES AND DOCUSATE SODIUM 2 TABLET: 8.6; 5 TABLET ORAL at 08:57

## 2017-10-07 RX ADMIN — MORPHINE SULFATE 30 MG: 30 TABLET, EXTENDED RELEASE ORAL at 08:46

## 2017-10-07 RX ADMIN — CELECOXIB 200 MG: 200 CAPSULE ORAL at 08:58

## 2017-10-07 ASSESSMENT — PAIN DESCRIPTION - DESCRIPTORS
DESCRIPTORS: TIGHTNESS
DESCRIPTORS: BURNING
DESCRIPTORS: TIGHTNESS
DESCRIPTORS: TIGHTNESS

## 2017-10-07 NOTE — PLAN OF CARE
Problem: Chemotherapy Effects (Adult)  Goal: Signs and Symptoms of Listed Potential Problems Will be Absent, Minimized or Managed (Chemotherapy Effects)  Signs and symptoms of listed potential problems will be absent, minimized or managed by discharge/transition of care (reference Chemotherapy Effects (Adult) CPG).   Outcome: No Change  Day 1 cycle 1 R-EPOCH. VSS. R port patent with CIVI chemo and NS at 50 cc/hr running. Brisk blood return noted q4h. Contact precautions maintained for MRSA. Oxycodone given when available for mid back and sternal pain.

## 2017-10-07 NOTE — PLAN OF CARE
Problem: Patient Care Overview  Goal: Plan of Care/Patient Progress Review  Outcome: No Change     AVSS, afebrile. C/o back pain, oxycodone given x 1. Benadryl given for sleep. Plan to start chemo this am. Cont POC.

## 2017-10-07 NOTE — H&P
[facility], [place]    History and Physical  Hematology / Oncology     Date of Admission:  10/6/2017  Date of Service (when I saw the patient): 10/06/17    Assessment & Plan   Tisha Arias is a 57 year old female with a history of recently diagnosed DLBCL, breast cancer on Tamoxifen s/p  bilateral mastectomies and BENJIE/BSO, h/o papillary thyroid cancer s/p total thyroidectomy, chronic chest wall pain and asthma who presents for cycle 1 DA-REPOCH.     Day 2    #DLBCL. Follows with Dr. Sauceda. Newly diagnosed August 2017 with high-grade B-cell lymphoma, non-GCB with no evidence of c-MYC or BCL6 rearrangement, but with overexpression of c-MYC by immunohistochemistry and mitotic index over 95%.  Disease is localized above the diaphragm in thoracic and paravertebral soft tissue and mediastinal lymphadenopathy. Staging LP and BMBx were negative for lymphoma. She now presents for cycle 1 DA-REPOCH.  -HOLD PTA Dex 4 mg BID with addition of steroid in treatment regimen.  -Allopurinol 300 mg daily  -Labs wnl to proceed with therapy    Treatment Plan. Cycle 1 DA-REPOCH. Day 1=10/6/17.   -Rituximab 375 mg/m2 D1  -Etoposide 50 mg/m2 D1-4  -Vincristine/Doxorubicin 0.4 mg/m2 D1-4  -Cyclophosphamide 1500 mg D5  -Prednisone 60 mg BID D1-5  -Premed: Emend, Zofran 16 mg daily  -IT PPx Methotrexate. Scheduled for 10/9 at 1100 in XR.  -Dexamethasone 8 mg D6 and 7.  -Neulasta 24-72 hrs after chemotherapy. Will likely be able to add on to 10/12 appointment.    #Stage 1, ER/OH-positive, HER2-negative breast cancer. Follows with Dr. Crawley. Diagnosed in 2011. Oncotype recurrence score of 11 (7% recurrence risk after 5 years of tamoxifen). S/p bilateral mastectomies and BENJIE/BSO in 2012. For endocrine therapy she was on Arimidex from 4214-2202, then changed to Tamoxifen b/c of arthralgias on Arimidex. Has been on Tamoxifen since 2014.   -Hold Tamoxifen with chemotherapy. Last dose 10/5.     #Papillary  thyroid cancer. Follows with Dr. Castro. Diagnosed in 2013. S/p total thyroidectomy and CND. Received ETOH treatment August 2014, October 2014 and December 2014. Has post-surgical hypothyroidism.  -Continue Levothyroxine 112 mcg (2 tabs M-Sat, 3 tabs Sun) for a total of 240 mcg/day over the course of a week.  -Continue Calcitriol 0.25 mcg (2 tabs in the morning and 1 tab in the evening).    #H/o Rachael en Y gastric bypass. Likely affecting absorption, thus patient is on multiple supplements.  -Continue Ca gluconate, Ca-Vit D, Magnesium    Chronic Medical Conditions  #Chronic chest wall pain after mastectomy.   -Continue MS Contin 30 mg morning and afternoon and 60 mg at night  -Oxycodone 30 mg q4hrs prn  -Celebrex 200 BID.   -Will HOLD Colchicine as interaction with chemo causing increased GI upset and myelosuppression.     #Asthma, allergies. Continue Singulair, Asthmadex, Zyrtec, Triamcinolone cream    #LE edema. Continue HCTZ 12.5 mg daily, closely monitor renal function with daily BMP.    #Hypokalemia  #Chronic muscle cramps. Continue KCl 20 mEq daily, Robaxin 750 mg QID, Valium 5 mg TID, Flexeril prn    HEM: Anemia, no need for transfusion today    FEN  -IVF NS @ 50mL/hr  -Regular diet  -Replace lytes prn    PPx  -VTE: Lovenox 40 mg daily (hold for platelets <50K)  -GI: Continue Ranitidine 75 mg TID    Dispo: Suspect discharge either 10/10 or 10/11 pending completion of chemotherapy. Will need Neulasta on discharge and f/u with SANJANA.       Code Status   Full Code    Primary Care Physician   Shahla Crawley    Chief Complaint   Admission for cycle 1 DARWIN-JAMISON    History is obtained from the patient    History of Present Illness    *Adopted from most recent clinic note and agree    Tisha GARNETT Hugo is a 57 year old female with a history of recently diagnosed DLBCL, breast cancer on Tamoxifen s/p  bilateral mastectomies and BENJIE/BSO, h/o papillary thyroid cancer s/p total thyroidectomy, chronic chest wall  pain and asthma who presents for cycle 1 DA-REPOCH. She was diagnosed with early-stage breast cancer in 2011 and underwent bilateral mastectomy with reconstruction and has been on Tamoxifen. She did not have chemotherapy or radiation. She has suffered chronically from a pain syndrome involving the chest wall and back and [facility]. Benitez.  In August 2017, she presented to ER with dramatic worsening chest pain and back pain. Her workup was negative for cardiac and pulmonary issues.  However, the CT scan performed on 09/01/2017 demonstrated a new destructive lesion in the medial right tenth rib extending to the right T9-T10 neural foramen with vertebral body invasion. There was associated conglomerate of lymphadenopathy around the mid T-spine. On 09/15 she underwent CT-guided biopsy of a T10 paraspinal mass on the right. The pathology report of the T8 vertebral body invading mass showed B-cell high-grade lymphoma. The morphology shows a population of large cells, diffuse lymphoid infiltrate. The cells are atypical with abundant mitotic and apoptotic activity and single cell necrosis. Tumor is CD45 and CD79a positive and focally weakly CD30 positive. The other stains revealed positivity for CD20, BCL6, BCL2 and MUM1, and CD10 and CD21 negative. The CD5 and CD3 highlighted background T-cells.  MYC expression was equivocal with approximately 40% nuclei staining.  Ki-67 proliferative index is greater than 90-95%. The immunohistochemistry is suggestive of non-GCB immunophenotype of diffuse large B-cell lymphoma. The cytogenetics did not show rearrangement of the BCL2 or c-MYC.  There is the presence of BCL6 rearrangement in 78% of cells and gains of MYC and BCL2, but no amplifications. A staging LP and BMBx were negative for lymphoma. She now presents for cycle 1 DA-REPOCH.     On admission, pt is accompanied by her  and friend. She has been having increased pain as well as  increased shortness of breath requiring inhaler use this morning. She spoke with Dr. Benitez on the phone yesterday and increased her breakthrough Oxycodone. She has been having nausea for the past couple weeks since her lymphoma diagnosis. She has some difficulty with urination, but no dysuria. No recent fevers or cold like symptoms. Has chronic LE edema. Bowel have been regular, she is typically on the constipation side. Explained chemotherapy regimen. Pt in agreement to start chemotherapy.     Interval History: Over the last 24h she has done well with chemo, pian in the chest back, port side, improved with pain meds.No fevers, no N/V/D, no SOB. Overall in good mood today      Review of Systems   The 10 point Review of Systems is negative other than noted in the HPI or here.     Physical Exam   Temp: 99  F (37.2  C) Temp src: Oral BP: 118/61 Pulse: 63   Resp: 18 SpO2: 96 % O2 Device: None (Room air)    Vital Signs with Ranges  Temp:  [97.8  F (36.6  C)-99  F (37.2  C)] 99  F (37.2  C)  Pulse:  [63-98] 63  Resp:  [12-22] 18  BP: (102-142)/(55-76) 118/61  SpO2:  [95 %-97 %] 96 %  189 lbs 14.4 oz    Constitutional: Pleasant female seen laying in bed. No apparent distress, and appears stated age.  Eyes: Lids and lashes normal, sclera clear, conjunctiva normal.  ENT: Normocephalic, oral pharynx with moist mucus membranes, tonsils without erythema or exudates, gums normal and good dentition.   Respiratory: No increased work of breathing, good air exchange, clear to auscultation bilaterally, no crackles or wheezing.  Cardiovascular: Regular rate and rhythm, normal S1 and S2, and no murmur noted.  GI: No masses or scars. +BS. Soft. Tenderness on palpation to LUQ.  Skin: BMBx site c/d/i. No bruising or bleeding, no redness, warmth, or swelling, no rashes, no lesions, no jaundice.  Extremities: There is no redness, warmth, or swelling of the joints. No lower extremity edema. No cyanosis.  Neurologic: Awake, alert, oriented  to name, place and time.    Vascular access: Port, c/d/i      Data   ROUTINE IP LABS (Last four results)  BMP    Recent Labs  Lab 10/07/17  0601 10/06/17  1050 10/02/17  0919    139  --    POTASSIUM 3.6 3.8 3.6   CHLORIDE 105 101  --    ELMO 7.8* 8.0*  --    CO2 29 30  --    BUN 17 19  --    CR 0.77 0.77  --    * 107*  --      CBC    Recent Labs  Lab 10/07/17  0601 10/06/17  1050 10/04/17  1245 10/02/17  0919   WBC 5.0 9.1  --  7.7   RBC 3.90 4.56  --  4.38   HGB 9.4* 11.2*  --  10.7*   HCT 32.0* 37.1 35.0 35.1   MCV 82 81  --  80   MCH 24.1* 24.6*  --  24.4*   MCHC 29.4* 30.2*  --  30.5*   RDW 15.6* 15.3*  --  14.5    219  --  214     INR    Recent Labs  Lab 10/02/17  0919   INR 1.14       Preeti Guzman MD

## 2017-10-07 NOTE — PROGRESS NOTES
Chemotherapy  D: Brisk Blood return is per R port catheter. Plentiful Urine output as recorded in intake and output flowsheet.     I: Premedication-zofran given (see electronic medical administration record). Dose #1 of Etoposide and Vincristine/Doxorubicin started to infuse over 24 hours.     A: Tolerated well.     P: Continue to monitor urine output and symptoms of nausea. Screen for symptoms of toxicity.

## 2017-10-07 NOTE — PLAN OF CARE
Problem: Patient Care Overview  Goal: Plan of Care/Patient Progress Review  Outcome: Improving  Rituximab  D: Baseline vital signs and temperature were normal. Blood return was positive per single lumen implanted port central catheter.    I: Rituximab infusion started at 50mg/h. Rate adjusted by very slow ramp-up according to protocol and patient tolerance. Pre-medications included diphenhydramine and acetaminophen. Oxycodone immediate release x2 doses for breakthrough pain.  A: Maximum rate of rituximab infusion tolerated was 200mg/h. Complaints of mild chest tightness and chilling resolved spontaneously, but precluded more aggressive ramp-up.  P: Vincristine, doxorubicin and etoposide continuous intravenous infusions scheduled to begin in am.

## 2017-10-07 NOTE — PROGRESS NOTES
"CLINICAL NUTRITION SERVICES - ASSESSMENT NOTE     Nutrition Prescription    RECOMMENDATIONS FOR MDs/PROVIDERS TO ORDER:  1) Recommend check B12, vit D as last results form >1 year ago.  2) Recommend resume iron supplementation as was previously low when checked ~1 month ago.    Malnutrition Status:    Non-severe malnutrition in the context of chronic illness    Recommendations already ordered by Registered Dietitian (RD):  Modify snacks/supplements between meals, allow pt to order any prn.    Future/Additional Recommendations:  Continue to monitor po intake/appetite. If suspect inadequate, recommend start calorie counts and scheduled ONS.     REASON FOR ASSESSMENT  Tisha Arias is a/an 57 year old female assessed by the dietitian for Provider Order - \"h/o gastric bypass/concerns for nutrient absorption.\"    NUTRITION HISTORY  Per H&P: 57 year old female with a history of recently diagnosed DLBCL, breast cancer on Tamoxifen s/p  bilateral mastectomies and BENJIE/BSO, h/o papillary thyroid cancer s/p total thyroidectomy, chronic chest wall pain, asthma, and [facility]     Pt reports she typically eats 3 meals/day + some snacks between, not always. Appears to eat a good variety of foods, although she does mention that in the past she's had a reaction to some fruits/vegetables. She declined wanting to yrn any foods as \"allergies.\" She does report that blue dye is an allergen, and that is noted in her chart.    She also states that she's been struggling with N/V from pain since ~August. Feels that she's been eating less since, thinks due in part to gastric pain with eating. She states she takes Ca citrate, vit D, and iron with hx of RYGB. Notes that her sublingual B12 was stopped in ~2015?    CURRENT NUTRITION ORDERS  Diet: Regular + snacks/ONS with meals prn  Intake/Tolerance: See above    LABS  Labs reviewed  K+ - WNL  Mg++, Phos WNL yesterday  Iron - 19 (L) " "9/7/17    MEDICATIONS  Medications reviewed  Calcitriol  Calcium D-Glucarate, 1.5g TID  Digestzymes, 1 daily  KCl, 20 mEq daily  Prednisone  NS @ 50 ml/hr    ANTHROPOMETRICS  Height: 165.1 cm (5' 5\")  Most Recent Weight: 86.1 kg (189 lb 14.4 oz)    IBW: 59 kg  BMI: Obesity Grade I BMI 30-34.9  Weight History: Lowest wt obtained yesterday, 84.9 kg (10/6). This is a 4% wt loss in the past 1 month per hx below.  Wt Readings from Last 10 Encounters:   10/07/17 86.1 kg (189 lb 14.4 oz)   10/04/17 87.7 kg (193 lb 6.4 oz)   10/03/17 87.7 kg (193 lb 4.8 oz)   09/25/17 87.6 kg (193 lb 3.2 oz)   09/15/17 87.9 kg (193 lb 12.6 oz)   09/14/17 87.9 kg (193 lb 12.6 oz)   09/11/17 87.5 kg (193 lb)   09/08/17 88.9 kg (196 lb)   09/07/17 88.9 kg (196 lb)   09/01/17 89 kg (196 lb 3.2 oz)     Dosing Weight: 66 kg (adj wt using IBW 59 kg and lowest actual wt 84.9 kg on 10/6)    ASSESSED NUTRITION NEEDS  Estimated Energy Needs: 6985-5950 kcals/day (25 - 30 kcals/kg)  Justification: Maintenance and Obese  Estimated Protein Needs: 79-99 grams protein/day (1.2 - 1.5 grams of pro/kg)  Justification: Increased needs  Estimated Fluid Needs: (1 mL/kcal)   Justification: Maintenance or Per provider pending fluid status    PHYSICAL FINDINGS  See malnutrition section below.    MALNUTRITION  % Intake: < 75% for >/= 1 month (non-severe)  % Weight Loss: Up to 5% in 1 month (non-severe)  Subcutaneous Fat Loss: Unable to assess  Muscle Loss: Unable to assess  Fluid Accumulation/Edema: None noted per chart review  Malnutrition Diagnosis: Non-severe malnutrition in the context of chronic illness    NUTRITION DIAGNOSIS  Inadequate protein-energy intake related to N/V, gastric pain with eating as evidenced by up to 5% wt loss in 1 month, pt reports <75% intake for >1 month.       INTERVENTIONS  Implementation  Nutrition Education: Provided education on small, frequent meals, ONS, including protein source at each meal/snack.   Medical food supplement " therapy     Goals  Patient to consume % of nutritionally adequate meal trays TID, or the equivalent with supplements/snacks.     Monitoring/Evaluation  Progress toward goals will be monitored and evaluated per protocol.    Nicolette Campos RD, LD  W/e pager: [phone]

## 2017-10-08 LAB
ALBUMIN SERPL-MCNC: 2.6 G/DL (ref 3.4–5)
ALP SERPL-CCNC: 54 U/L (ref 40–150)
ALT SERPL W P-5'-P-CCNC: 25 U/L (ref 0–50)
ANION GAP SERPL CALCULATED.3IONS-SCNC: 8 MMOL/L (ref 3–14)
AST SERPL W P-5'-P-CCNC: 18 U/L (ref 0–45)
BASOPHILS # BLD AUTO: 0 10E9/L (ref 0–0.2)
BASOPHILS NFR BLD AUTO: 0 %
BILIRUB SERPL-MCNC: 0.3 MG/DL (ref 0.2–1.3)
BUN SERPL-MCNC: 14 MG/DL (ref 7–30)
CALCIUM SERPL-MCNC: 7.5 MG/DL (ref 8.5–10.1)
CHLORIDE SERPL-SCNC: 107 MMOL/L (ref 94–109)
CO2 SERPL-SCNC: 27 MMOL/L (ref 20–32)
CREAT SERPL-MCNC: 0.74 MG/DL (ref 0.52–1.04)
DIFFERENTIAL METHOD BLD: ABNORMAL
EOSINOPHIL # BLD AUTO: 0 10E9/L (ref 0–0.7)
EOSINOPHIL NFR BLD AUTO: 0.2 %
ERYTHROCYTE [DISTWIDTH] IN BLOOD BY AUTOMATED COUNT: 15.7 % (ref 10–15)
GFR SERPL CREATININE-BSD FRML MDRD: 81 ML/MIN/1.7M2
GLUCOSE SERPL-MCNC: 119 MG/DL (ref 70–99)
HCT VFR BLD AUTO: 31.7 % (ref 35–47)
HGB BLD-MCNC: 9.4 G/DL (ref 11.7–15.7)
IMM GRANULOCYTES # BLD: 0 10E9/L (ref 0–0.4)
IMM GRANULOCYTES NFR BLD: 0.3 %
LYMPHOCYTES # BLD AUTO: 0.6 10E9/L (ref 0.8–5.3)
LYMPHOCYTES NFR BLD AUTO: 10.2 %
MCH RBC QN AUTO: 24.7 PG (ref 26.5–33)
MCHC RBC AUTO-ENTMCNC: 29.7 G/DL (ref 31.5–36.5)
MCV RBC AUTO: 83 FL (ref 78–100)
MONOCYTES # BLD AUTO: 0.2 10E9/L (ref 0–1.3)
MONOCYTES NFR BLD AUTO: 3.5 %
NEUTROPHILS # BLD AUTO: 5.4 10E9/L (ref 1.6–8.3)
NEUTROPHILS NFR BLD AUTO: 85.8 %
NRBC # BLD AUTO: 0 10*3/UL
NRBC BLD AUTO-RTO: 0 /100
PLATELET # BLD AUTO: 168 10E9/L (ref 150–450)
POTASSIUM SERPL-SCNC: 4 MMOL/L (ref 3.4–5.3)
PROT SERPL-MCNC: 5.4 G/DL (ref 6.8–8.8)
RBC # BLD AUTO: 3.81 10E12/L (ref 3.8–5.2)
SODIUM SERPL-SCNC: 142 MMOL/L (ref 133–144)
WBC # BLD AUTO: 6.3 10E9/L (ref 4–11)

## 2017-10-08 PROCEDURE — 25000132 ZZH RX MED GY IP 250 OP 250 PS 637: Performed by: INTERNAL MEDICINE

## 2017-10-08 PROCEDURE — 12000008 ZZH R&B INTERMEDIATE UMMC

## 2017-10-08 PROCEDURE — 25000131 ZZH RX MED GY IP 250 OP 636 PS 637

## 2017-10-08 PROCEDURE — 25000128 H RX IP 250 OP 636

## 2017-10-08 PROCEDURE — 25000132 ZZH RX MED GY IP 250 OP 250 PS 637: Performed by: DERMATOLOGY

## 2017-10-08 PROCEDURE — 80053 COMPREHEN METABOLIC PANEL: CPT | Performed by: PHYSICIAN ASSISTANT

## 2017-10-08 PROCEDURE — 85025 COMPLETE CBC W/AUTO DIFF WBC: CPT | Performed by: PHYSICIAN ASSISTANT

## 2017-10-08 PROCEDURE — 25000125 ZZHC RX 250

## 2017-10-08 PROCEDURE — 25000128 H RX IP 250 OP 636: Performed by: PHYSICIAN ASSISTANT

## 2017-10-08 PROCEDURE — 25000132 ZZH RX MED GY IP 250 OP 250 PS 637: Performed by: PHYSICIAN ASSISTANT

## 2017-10-08 PROCEDURE — 25000132 ZZH RX MED GY IP 250 OP 250 PS 637

## 2017-10-08 PROCEDURE — S0169 CALCITROL: HCPCS | Performed by: INTERNAL MEDICINE

## 2017-10-08 RX ADMIN — ETOPOSIDE 100 MG: 20 INJECTION, SOLUTION, CONCENTRATE INTRAVENOUS at 10:25

## 2017-10-08 RX ADMIN — OXYCODONE HYDROCHLORIDE 30 MG: 30 TABLET ORAL at 14:53

## 2017-10-08 RX ADMIN — HYDROCHLOROTHIAZIDE 12.5 MG: 12.5 TABLET ORAL at 08:46

## 2017-10-08 RX ADMIN — CYCLOBENZAPRINE HYDROCHLORIDE 10 MG: 10 TABLET, FILM COATED ORAL at 03:05

## 2017-10-08 RX ADMIN — SODIUM CHLORIDE: 9 INJECTION, SOLUTION INTRAVENOUS at 04:34

## 2017-10-08 RX ADMIN — ONDANSETRON HYDROCHLORIDE 16 MG: 8 TABLET, FILM COATED ORAL at 08:48

## 2017-10-08 RX ADMIN — CETIRIZINE HYDROCHLORIDE 10 MG: 10 TABLET, FILM COATED ORAL at 08:50

## 2017-10-08 RX ADMIN — MORPHINE SULFATE 30 MG: 30 TABLET, EXTENDED RELEASE ORAL at 14:53

## 2017-10-08 RX ADMIN — CELECOXIB 200 MG: 200 CAPSULE ORAL at 08:46

## 2017-10-08 RX ADMIN — DIAZEPAM 5 MG: 5 TABLET ORAL at 04:29

## 2017-10-08 RX ADMIN — CROMOLYN SODIUM 1 DROP: 40 SOLUTION/ DROPS OPHTHALMIC at 08:47

## 2017-10-08 RX ADMIN — CROMOLYN SODIUM 1 DROP: 40 SOLUTION/ DROPS OPHTHALMIC at 16:07

## 2017-10-08 RX ADMIN — POTASSIUM CHLORIDE 20 MEQ: 750 TABLET, EXTENDED RELEASE ORAL at 08:45

## 2017-10-08 RX ADMIN — OXYCODONE HYDROCHLORIDE 30 MG: 30 TABLET ORAL at 19:02

## 2017-10-08 RX ADMIN — OXYCODONE HYDROCHLORIDE 30 MG: 30 TABLET ORAL at 11:31

## 2017-10-08 RX ADMIN — MORPHINE SULFATE 30 MG: 30 TABLET, EXTENDED RELEASE ORAL at 06:07

## 2017-10-08 RX ADMIN — RANITIDINE 75 MG: 75 TABLET, FILM COATED ORAL at 08:48

## 2017-10-08 RX ADMIN — CELECOXIB 200 MG: 200 CAPSULE ORAL at 20:03

## 2017-10-08 RX ADMIN — OXYCODONE HYDROCHLORIDE 30 MG: 30 TABLET ORAL at 23:03

## 2017-10-08 RX ADMIN — METHOCARBAMOL 750 MG: 750 TABLET ORAL at 16:07

## 2017-10-08 RX ADMIN — DIAZEPAM 5 MG: 5 TABLET ORAL at 20:34

## 2017-10-08 RX ADMIN — METHOCARBAMOL 750 MG: 750 TABLET ORAL at 06:07

## 2017-10-08 RX ADMIN — SENNOSIDES AND DOCUSATE SODIUM 2 TABLET: 8.6; 5 TABLET ORAL at 20:04

## 2017-10-08 RX ADMIN — LIDOCAINE: 50 OINTMENT TOPICAL at 16:08

## 2017-10-08 RX ADMIN — DIAZEPAM 5 MG: 5 TABLET ORAL at 12:31

## 2017-10-08 RX ADMIN — MORPHINE SULFATE 60 MG: 60 TABLET, EXTENDED RELEASE ORAL at 20:04

## 2017-10-08 RX ADMIN — DOXORUBICIN HYDROCHLORIDE 0.8 MG: 2 INJECTION, SOLUTION INTRAVENOUS at 10:26

## 2017-10-08 RX ADMIN — CALCITRIOL 0.25 MCG: 0.25 CAPSULE, LIQUID FILLED ORAL at 20:03

## 2017-10-08 RX ADMIN — LIDOCAINE: 50 OINTMENT TOPICAL at 11:32

## 2017-10-08 RX ADMIN — RANITIDINE 75 MG: 75 TABLET, FILM COATED ORAL at 20:03

## 2017-10-08 RX ADMIN — CETIRIZINE HYDROCHLORIDE 10 MG: 10 TABLET, FILM COATED ORAL at 14:54

## 2017-10-08 RX ADMIN — CROMOLYN SODIUM 1 DROP: 40 SOLUTION/ DROPS OPHTHALMIC at 20:04

## 2017-10-08 RX ADMIN — LEVOTHYROXINE SODIUM 224 MCG: 112 TABLET ORAL at 08:47

## 2017-10-08 RX ADMIN — OXYCODONE HYDROCHLORIDE 30 MG: 30 TABLET ORAL at 03:02

## 2017-10-08 RX ADMIN — METHOCARBAMOL 750 MG: 750 TABLET ORAL at 20:03

## 2017-10-08 RX ADMIN — MONTELUKAST SODIUM 20 MG: 10 TABLET, FILM COATED ORAL at 20:03

## 2017-10-08 RX ADMIN — METHOCARBAMOL 750 MG: 750 TABLET ORAL at 11:31

## 2017-10-08 RX ADMIN — CALCITRIOL 0.5 MCG: 0.5 CAPSULE, LIQUID FILLED ORAL at 08:49

## 2017-10-08 RX ADMIN — ENOXAPARIN SODIUM 40 MG: 40 INJECTION SUBCUTANEOUS at 14:52

## 2017-10-08 RX ADMIN — MONTELUKAST SODIUM 20 MG: 10 TABLET, FILM COATED ORAL at 08:44

## 2017-10-08 RX ADMIN — OXYCODONE HYDROCHLORIDE 30 MG: 30 TABLET ORAL at 07:03

## 2017-10-08 RX ADMIN — PREDNISONE 60 MG: 50 TABLET ORAL at 08:50

## 2017-10-08 RX ADMIN — ALLOPURINOL 300 MG: 300 TABLET ORAL at 08:45

## 2017-10-08 RX ADMIN — SENNOSIDES AND DOCUSATE SODIUM 2 TABLET: 8.6; 5 TABLET ORAL at 08:49

## 2017-10-08 RX ADMIN — RANITIDINE 75 MG: 75 TABLET, FILM COATED ORAL at 14:53

## 2017-10-08 RX ADMIN — CROMOLYN SODIUM 1 DROP: 40 SOLUTION/ DROPS OPHTHALMIC at 11:30

## 2017-10-08 RX ADMIN — DIPHENHYDRAMINE HYDROCHLORIDE 50 MG: 50 CAPSULE ORAL at 23:03

## 2017-10-08 RX ADMIN — PREDNISONE 60 MG: 50 TABLET ORAL at 20:03

## 2017-10-08 RX ADMIN — CETIRIZINE HYDROCHLORIDE 10 MG: 10 TABLET, FILM COATED ORAL at 20:03

## 2017-10-08 ASSESSMENT — PAIN DESCRIPTION - DESCRIPTORS
DESCRIPTORS: SQUEEZING;TIGHTNESS
DESCRIPTORS: TIGHTNESS;SPASM
DESCRIPTORS: TIGHTNESS;SPASM
DESCRIPTORS: SQUEEZING
DESCRIPTORS: SORE
DESCRIPTORS: TIGHTNESS
DESCRIPTORS: SQUEEZING;TIGHTNESS

## 2017-10-08 NOTE — PLAN OF CARE
Problem: Patient Care Overview  Goal: Plan of Care/Patient Progress Review  Outcome: No Change  Assumed care at 1930. AVSS on RA. Cycle 1, Day 2 DA-R-EPOCH currently infusing, tolerating well; good blood return noted. Complaints of tightness and spasms in sternum/ribs and back r/t mastectomy; managed with 30 mg oxy x2, 10 mg flexeril x1, 5 mg valium x1, and scheduled MS Contin, robaxin, and ketamine ointment. Denies nausea. Gave benadryl for sleep, pt still had difficulty falling asleep. Up independently. Voiding adequately. Continue with POC.

## 2017-10-08 NOTE — PROGRESS NOTES
[facility] Progress Note    Patient name: Tisha Arias    MRN: [id number]    Admission date: 10/6/2017          Assessment and plan:      Tisha Arias is a 57 year old female with a history of recently diagnosed DLBCL, breast cancer on Tamoxifen s/p  bilateral mastectomies and BENJIE/BSO, h/o papillary thyroid cancer s/p total thyroidectomy, chronic chest wall pain and asthma who presents for cycle 1 DA-REPOCH.      Day 3  #DLBCL. Follows with Dr. Sauceda. Newly diagnosed August 2017 with high-grade B-cell lymphoma, non-GCB with no evidence of c-MYC or BCL6 rearrangement, but with overexpression of c-MYC by immunohistochemistry and mitotic index over 95%.  Disease is localized above the diaphragm in thoracic and paravertebral soft tissue and mediastinal lymphadenopathy. Staging LP and BMBx were negative for lymphoma. She now presents for cycle 1 DA-REPOCH.  -HOLD PTA Dex 4 mg BID with addition of steroid in treatment regimen.  -Allopurinol 300 mg daily  -Labs wnl to proceed with therapy     Treatment Plan. Cycle 1 DA-REPOCH. Day 1=10/6/17.   -Rituximab 375 mg/m2 D1  -Etoposide 50 mg/m2 D1-4  -Vincristine/Doxorubicin 0.4 mg/m2 D1-4  -Cyclophosphamide 1500 mg D5  -Prednisone 60 mg BID D1-5  -Premed: Emend, Zofran 16 mg daily  -IT PPx Methotrexate. Scheduled for 10/9 at 1100 in XR.  -Dexamethasone 8 mg D6 and 7.  -Neulasta 24-72 hrs after chemotherapy. Will likely be able to add on to 10/12 appointment.     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 Follows with Dr. Crawley. Diagnosed in 2011. Oncotype recurrence score of 11 (7% recurrence risk after 5 years of tamoxifen). S/p bilateral mastectomies and BENJIE/BSO in 2012. For endocrine therapy she was on Arimidex from 3106-1433, then changed to Tamoxifen b/c of arthralgias on Arimidex. Has been on Tamoxifen since 2014.   -Hold Tamoxifen with chemotherapy. Last dose 10/5.      #Papillary thyroid cancer. Follows with Dr. Castro. Diagnosed in  2013. S/p total thyroidectomy and CND. Received ETOH treatment August 2014, October 2014 and December 2014. Has post-surgical hypothyroidism.  -Continue PTA Levothyroxine  -Continue PTA calcitriol     #H/o Rachael en Y gastric bypass. Likely affecting absorption, thus patient is on multiple supplements.  -Continue Ca gluconate, Ca-Vit D, Magnesium     Chronic Medical Conditions  #Chronic chest wall pain after mastectomy.   -Continue MS Contin 30 mg morning and afternoon and 60 mg at night  -Oxycodone 30 mg q4hrs prn  -Celebrex 200 BID.   -Will HOLD Colchicine as interaction with chemo causing increased GI upset and myelosuppression.      #Asthma, allergies. Continue Singulair, Asthmadex, Zyrtec, Triamcinolone cream     #LE edema. Continue HCTZ 12.5 mg daily  -closely monitor renal function with daily BMP.     #Hypokalemia  #Chronic muscle cramps.   -Continue KCl 20 mEq daily, Robaxin 750 mg QID, Valium 5 mg TID, Flexeril prn     # Anemia:  -continue to monitor     FEN  -IVF NS  -Regular diet  -Replace lytes prn     PPx  -VTE: Lovenox 40 mg daily (hold for platelets <50K)  -GI: Continue Ranitidine 75 mg TID     Dispo: Suspect discharge either 10/10 or 10/11 pending completion of chemotherapy. Will need Neulasta on discharge and f/u with [facility].     Patient discussed with Dr. Guzman who agrees with assessment and plan.   Pablo Martinez MD   PGY5  [facility] Fellow        Subjective:   Nursing notes reviewed. Overnight no significant events. No fevers, chills, nausea, vomiting. Very happy with how well controlled nausea is. Occasional pain over the port site.     ROS: 4 point ROS including Respiratory, CV, GI and , other than that noted in the HPI, is negative           Objective:   Temp:  [95.7  F (35.4  C)-99  F (37.2  C)] 95.7  F (35.4  C)  Pulse:  [63-76] 75  Heart Rate:  [73-80] 80  Resp:  [18-20] 18  BP: (116-135)/(53-65) 118/53  SpO2:  [96 %-99 %] 99 %  Weights  Vitals:    10/06/17 0916 10/07/17 0900 10/08/17 0837    Weight: 84.9 kg (187 lb 3.2 oz) 86.1 kg (189 lb 14.4 oz) 87.7 kg (193 lb 5.5 oz)       I/O  I/O last 3 completed shifts:  In: 2516.5 [P.O.:980; I.V.:1200; IV Piggyback:336.5]  Out: 3500 [Urine:3500]  General: NAD, appears comfortable  HEENT: MMM. No oral lesions  CV: RRR, normal S1 and S2, no m/r/g  Resp: CTA bilaterally, normal vesicular breath sounds    GI: soft, non-tender, non-distended. Normoactive bs    Skin: no rash on exposed areas skin. Mild pain over palpation of the port site.     Extremities: no edema    Neuro: alert, conversing appropriately. Grossly w/o focal deficits.          Medications:       Calcium D-Glucarate 1.5g capsules  3 capsule Oral TID     levothyroxine  224 mcg Oral Daily     ondansetron  16 mg Oral Q24H     [START ON 10/11/2017] fosaprepitant (EMEND) 150 mg intermittent infusion  150 mg Intravenous Once     [START ON 10/12/2017] dexamethasone  8 mg Oral Daily     [START ON 10/9/2017] INTRATHECAL chemo - Cytarabine and/or methotrexate and/or Hydrocortisone   Intrathecal Once     predniSONE  30 mg/m2 (Treatment Plan Recorded) Oral BID     Chemotherapy Infusing-Continuous Infusion   Does not apply Q8H     etoposide (TOPOSAR) chemo infusion  50 mg/m2 (Treatment Plan Recorded) Intravenous Q24H     vinCRIStine/DOXOrubicin (ONCOVIN/ADRIAMYCIN) chemo infusion  0.4 mg/m2 (Treatment Plan Recorded) Intravenous Q24H     [START ON 10/11/2017] cyclophosphamide (CYTOXAN) chemo infusion  1,500 mg Intravenous Once     [START ON 10/12/2017] filgrastim (NEUPOGEN/GRANIX) intravenous  5 mcg/kg (Treatment Plan Recorded) Intravenous Daily at 8 pm     senna-docusate  2 tablet Oral BID     allopurinol  300 mg Oral Daily     celecoxib  200 mg Oral BID     cetirizine  10 mg Oral TID     Ketamine 6%/Lidocaine 10%/Ketoprofen 10% in PLO cream/gel   Topical BID     cromolyn  1 drop Both Eyes 4x Daily     docusate sodium  100 mg Oral Daily     hydrochlorothiazide  12.5 mg Oral Daily     lidocaine   Topical 4x Daily      lidocaine-prilocaine   Topical Once     montelukast  20 mg Oral BID     morphine  30 mg Oral BID     polyethylene glycol  17 g Oral Daily     potassium chloride SA  20 mEq Oral Daily     ranitidine  75 mg Oral TID     tacrolimus   Topical BID     triamcinolone   Topical BID     sodium chloride (PF)  3 mL Intracatheter Q8H     methocarbamol  750 mg Oral 4x Daily     Muscle Mag capsules  2 tablet Oral TID     enoxaparin  40 mg Subcutaneous Q24H     Digestzymes capsules  1 capsule Oral Daily     calcitRIOL  0.5 mcg Oral QAM     calcitRIOL  0.25 mcg Oral QPM     heparin lock flush  5-10 mL Intracatheter Q24H     heparin  5 mL Intracatheter Q28 Days     morphine  60 mg Oral QPM            Labs:   The following labs were reviewed  CMP:  Recent Labs  Lab 10/08/17  0536 10/07/17  0601 10/06/17  1050 10/02/17  0919    142 139  --    POTASSIUM 4.0 3.6 3.8 3.6   CHLORIDE 107 105 101  --    CO2 27 29 30  --    ANIONGAP 8 7 8  --    * 100* 107*  --    BUN 14 17 19  --    CR 0.74 0.77 0.77  --    GFRESTIMATED 81 78 77  --    GFRESTBLACK >90 >90 >90  --    ELMO 7.5* 7.8* 8.0*  --    MAG  --   --  2.2  --    PHOS  --   --  3.1  --    PROTTOTAL 5.4* 5.5* 6.6*  --    ALBUMIN 2.6* 2.8* 3.4  --    BILITOTAL 0.3 0.4 0.4  --    ALKPHOS 54 55 69  --    AST 18 16 22  --    ALT 25 23 25  --      CBC:  Recent Labs  Lab 10/08/17  0536 10/07/17  0601 10/06/17  1050 10/04/17  1245 10/02/17  0919   WBC 6.3 5.0 9.1  --  7.7   RBC 3.81 3.90 4.56  --  4.38   HGB 9.4* 9.4* 11.2*  --  10.7*   HCT 31.7* 32.0* 37.1 35.0 35.1   MCV 83 82 81  --  80   MCH 24.7* 24.1* 24.6*  --  24.4*   MCHC 29.7* 29.4* 30.2*  --  30.5*   RDW 15.7* 15.6* 15.3*  --  14.5    162 219  --  214           Studies:   reviewed    Patient has been seen, examined and evaluated by me independently. I have reviewed today's vital signs, medications, labs and imaging results. I have discussed the plan with the patient and her   PE  Alert, oriented  In no  acute distress  Vitals are stable  CVS and Pulmonary systems findings are normal  Labs  CBC and chemistry are not significant  Lab Results   Component Value Date    WBC 6.3 10/08/2017     Lab Results   Component Value Date    RBC 3.81 10/08/2017     Lab Results   Component Value Date    HGB 9.4 10/08/2017     Lab Results   Component Value Date    HCT 31.7 10/08/2017     No components found for: MCT  Lab Results   Component Value Date    MCV 83 10/08/2017     Lab Results   Component Value Date    MCH 24.7 10/08/2017     Lab Results   Component Value Date    MCHC 29.7 10/08/2017     Lab Results   Component Value Date    RDW 15.7 10/08/2017     Lab Results   Component Value Date     10/08/2017     Last Basic Metabolic Panel:  Lab Results   Component Value Date     10/08/2017      Lab Results   Component Value Date    POTASSIUM 4.0 10/08/2017     Lab Results   Component Value Date    CHLORIDE 107 10/08/2017     Lab Results   Component Value Date    ELMO 7.5 10/08/2017     Lab Results   Component Value Date    CO2 27 10/08/2017     Lab Results   Component Value Date    BUN 14 10/08/2017     Lab Results   Component Value Date    CR 0.74 10/08/2017     Lab Results   Component Value Date     10/08/2017       We went over why this regimen is chosen, the goal therapy (cure), what is her stage (noted her BM biopsy neg result), expected course in coming 2-3 weeks    Tomorrow IT chemo, she has h/o migraine, understandable worried    Afebrile    Oral intake is fine, mobile/active  Preeti Guzman MD

## 2017-10-08 NOTE — PLAN OF CARE
Problem: Chemotherapy Effects (Adult)  Goal: Signs and Symptoms of Listed Potential Problems Will be Absent, Minimized or Managed (Chemotherapy Effects)  Signs and symptoms of listed potential problems will be absent, minimized or managed by discharge/transition of care (reference Chemotherapy Effects (Adult) CPG).   Outcome: No Change  Day 2 cycle 1 DA-R-EPOCH. R port patent with brisk blood return noted q4h. NS 50 cc/hr. Pt up 4 lbs since yesterday and feeling swelling in face and toes. VSS. Chronic pain in mid back and sternal area-wants oxycodone and valium when available PRN. Contact precautions maintained for MRSA. Visit from  and daughter, pt ambulating in halls. Good appetite. Small BM today though bowels hypoactive. Plan for IT chemo tomorrow, not yet scheduled and DC with Neulasta 10/10 or 10/11.

## 2017-10-09 ENCOUNTER — APPOINTMENT (OUTPATIENT)
Dept: GENERAL RADIOLOGY | Facility: CLINIC | Age: 57
End: 2017-10-09
Attending: NURSE PRACTITIONER
Payer: COMMERCIAL

## 2017-10-09 ENCOUNTER — TELEPHONE (OUTPATIENT)
Dept: ONCOLOGY | Facility: CLINIC | Age: 57
End: 2017-10-09

## 2017-10-09 LAB
ALBUMIN SERPL-MCNC: 2.9 G/DL (ref 3.4–5)
ALP SERPL-CCNC: 57 U/L (ref 40–150)
ALT SERPL W P-5'-P-CCNC: 25 U/L (ref 0–50)
ANION GAP SERPL CALCULATED.3IONS-SCNC: 9 MMOL/L (ref 3–14)
AST SERPL W P-5'-P-CCNC: 18 U/L (ref 0–45)
BASOPHILS # BLD AUTO: 0 10E9/L (ref 0–0.2)
BASOPHILS NFR BLD AUTO: 0 %
BILIRUB SERPL-MCNC: 0.3 MG/DL (ref 0.2–1.3)
BUN SERPL-MCNC: 17 MG/DL (ref 7–30)
CALCIUM SERPL-MCNC: 8 MG/DL (ref 8.5–10.1)
CHLORIDE SERPL-SCNC: 106 MMOL/L (ref 94–109)
CO2 SERPL-SCNC: 27 MMOL/L (ref 20–32)
CREAT SERPL-MCNC: 0.7 MG/DL (ref 0.52–1.04)
DIFFERENTIAL METHOD BLD: ABNORMAL
EOSINOPHIL # BLD AUTO: 0 10E9/L (ref 0–0.7)
EOSINOPHIL NFR BLD AUTO: 0 %
ERYTHROCYTE [DISTWIDTH] IN BLOOD BY AUTOMATED COUNT: 15.4 % (ref 10–15)
GFR SERPL CREATININE-BSD FRML MDRD: 87 ML/MIN/1.7M2
GLUCOSE CSF-MCNC: 76 MG/DL (ref 40–70)
GLUCOSE SERPL-MCNC: 156 MG/DL (ref 70–99)
HCT VFR BLD AUTO: 32.5 % (ref 35–47)
HGB BLD-MCNC: 9.6 G/DL (ref 11.7–15.7)
IMM GRANULOCYTES # BLD: 0 10E9/L (ref 0–0.4)
IMM GRANULOCYTES NFR BLD: 0.1 %
LYMPHOCYTES # BLD AUTO: 0.5 10E9/L (ref 0.8–5.3)
LYMPHOCYTES NFR BLD AUTO: 6.4 %
MAGNESIUM SERPL-MCNC: 2 MG/DL (ref 1.6–2.3)
MCH RBC QN AUTO: 24.2 PG (ref 26.5–33)
MCHC RBC AUTO-ENTMCNC: 29.5 G/DL (ref 31.5–36.5)
MCV RBC AUTO: 82 FL (ref 78–100)
MONOCYTES # BLD AUTO: 0.4 10E9/L (ref 0–1.3)
MONOCYTES NFR BLD AUTO: 5.2 %
NEUTROPHILS # BLD AUTO: 6.5 10E9/L (ref 1.6–8.3)
NEUTROPHILS NFR BLD AUTO: 88.3 %
NRBC # BLD AUTO: 0 10*3/UL
NRBC BLD AUTO-RTO: 0 /100
PHOSPHATE SERPL-MCNC: 4 MG/DL (ref 2.5–4.5)
PLATELET # BLD AUTO: 191 10E9/L (ref 150–450)
POTASSIUM SERPL-SCNC: 3.7 MMOL/L (ref 3.4–5.3)
PROT CSF-MCNC: 47 MG/DL (ref 15–60)
PROT SERPL-MCNC: 5.7 G/DL (ref 6.8–8.8)
RBC # BLD AUTO: 3.97 10E12/L (ref 3.8–5.2)
RBC # CSF MANUAL: NORMAL /UL (ref 0–2)
SODIUM SERPL-SCNC: 143 MMOL/L (ref 133–144)
WBC # BLD AUTO: 7.3 10E9/L (ref 4–11)
WBC # CSF MANUAL: NORMAL /UL (ref 0–5)

## 2017-10-09 PROCEDURE — 85025 COMPLETE CBC W/AUTO DIFF WBC: CPT | Performed by: PHYSICIAN ASSISTANT

## 2017-10-09 PROCEDURE — 25000131 ZZH RX MED GY IP 250 OP 636 PS 637

## 2017-10-09 PROCEDURE — 84157 ASSAY OF PROTEIN OTHER: CPT | Performed by: NURSE PRACTITIONER

## 2017-10-09 PROCEDURE — 25000128 H RX IP 250 OP 636: Performed by: STUDENT IN AN ORGANIZED HEALTH CARE EDUCATION/TRAINING PROGRAM

## 2017-10-09 PROCEDURE — 40001004 ZZHCL STATISTIC FLOW INT 9-15 ABY TC 88188: Performed by: NURSE PRACTITIONER

## 2017-10-09 PROCEDURE — 3E0R305 INTRODUCTION OF OTHER ANTINEOPLASTIC INTO SPINAL CANAL, PERCUTANEOUS APPROACH: ICD-10-PCS | Performed by: RADIOLOGY

## 2017-10-09 PROCEDURE — 25000132 ZZH RX MED GY IP 250 OP 250 PS 637: Performed by: INTERNAL MEDICINE

## 2017-10-09 PROCEDURE — 25000132 ZZH RX MED GY IP 250 OP 250 PS 637: Performed by: DERMATOLOGY

## 2017-10-09 PROCEDURE — 82945 GLUCOSE OTHER FLUID: CPT | Performed by: NURSE PRACTITIONER

## 2017-10-09 PROCEDURE — 25000132 ZZH RX MED GY IP 250 OP 250 PS 637

## 2017-10-09 PROCEDURE — 88185 FLOWCYTOMETRY/TC ADD-ON: CPT | Performed by: NURSE PRACTITIONER

## 2017-10-09 PROCEDURE — S0169 CALCITROL: HCPCS | Performed by: INTERNAL MEDICINE

## 2017-10-09 PROCEDURE — 25000125 ZZHC RX 250: Performed by: RADIOLOGY

## 2017-10-09 PROCEDURE — 25000132 ZZH RX MED GY IP 250 OP 250 PS 637: Performed by: PHYSICIAN ASSISTANT

## 2017-10-09 PROCEDURE — 96450 CHEMOTHERAPY INTO CNS: CPT

## 2017-10-09 PROCEDURE — 25000125 ZZHC RX 250

## 2017-10-09 PROCEDURE — 25000128 H RX IP 250 OP 636: Performed by: NURSE PRACTITIONER

## 2017-10-09 PROCEDURE — 88184 FLOWCYTOMETRY/ TC 1 MARKER: CPT | Performed by: NURSE PRACTITIONER

## 2017-10-09 PROCEDURE — 36592 COLLECT BLOOD FROM PICC: CPT | Performed by: PHYSICIAN ASSISTANT

## 2017-10-09 PROCEDURE — 83735 ASSAY OF MAGNESIUM: CPT | Performed by: PHYSICIAN ASSISTANT

## 2017-10-09 PROCEDURE — 84100 ASSAY OF PHOSPHORUS: CPT | Performed by: PHYSICIAN ASSISTANT

## 2017-10-09 PROCEDURE — 12000008 ZZH R&B INTERMEDIATE UMMC

## 2017-10-09 PROCEDURE — 80053 COMPREHEN METABOLIC PANEL: CPT | Performed by: PHYSICIAN ASSISTANT

## 2017-10-09 PROCEDURE — 25000128 H RX IP 250 OP 636: Performed by: PHYSICIAN ASSISTANT

## 2017-10-09 PROCEDURE — 25000128 H RX IP 250 OP 636

## 2017-10-09 PROCEDURE — 89050 BODY FLUID CELL COUNT: CPT | Performed by: NURSE PRACTITIONER

## 2017-10-09 RX ORDER — LIDOCAINE HYDROCHLORIDE 10 MG/ML
30 INJECTION, SOLUTION EPIDURAL; INFILTRATION; INTRACAUDAL; PERINEURAL ONCE
Status: COMPLETED | OUTPATIENT
Start: 2017-10-09 | End: 2017-10-09

## 2017-10-09 RX ORDER — LIDOCAINE 40 MG/G
CREAM TOPICAL 3 TIMES DAILY PRN
Status: DISCONTINUED | OUTPATIENT
Start: 2017-10-09 | End: 2017-10-09

## 2017-10-09 RX ORDER — HYDROMORPHONE HYDROCHLORIDE 1 MG/ML
1 INJECTION, SOLUTION INTRAMUSCULAR; INTRAVENOUS; SUBCUTANEOUS ONCE
Status: DISCONTINUED | OUTPATIENT
Start: 2017-10-09 | End: 2017-10-11 | Stop reason: HOSPADM

## 2017-10-09 RX ORDER — FUROSEMIDE 10 MG/ML
20 INJECTION INTRAMUSCULAR; INTRAVENOUS ONCE
Status: COMPLETED | OUTPATIENT
Start: 2017-10-09 | End: 2017-10-09

## 2017-10-09 RX ORDER — HYDROMORPHONE HYDROCHLORIDE 1 MG/ML
1 INJECTION, SOLUTION INTRAMUSCULAR; INTRAVENOUS; SUBCUTANEOUS ONCE
Status: COMPLETED | OUTPATIENT
Start: 2017-10-09 | End: 2017-10-09

## 2017-10-09 RX ADMIN — HYDROMORPHONE HYDROCHLORIDE 1 MG: 1 INJECTION, SOLUTION INTRAMUSCULAR; INTRAVENOUS; SUBCUTANEOUS at 01:02

## 2017-10-09 RX ADMIN — METHOCARBAMOL 750 MG: 750 TABLET ORAL at 16:48

## 2017-10-09 RX ADMIN — TRIAMCINOLONE ACETONIDE: 0.25 OINTMENT TOPICAL at 17:07

## 2017-10-09 RX ADMIN — OXYCODONE HYDROCHLORIDE 30 MG: 30 TABLET ORAL at 17:06

## 2017-10-09 RX ADMIN — METHOCARBAMOL 750 MG: 750 TABLET ORAL at 19:38

## 2017-10-09 RX ADMIN — ONDANSETRON HYDROCHLORIDE 16 MG: 8 TABLET, FILM COATED ORAL at 08:15

## 2017-10-09 RX ADMIN — CALCITRIOL 0.5 MCG: 0.5 CAPSULE, LIQUID FILLED ORAL at 08:16

## 2017-10-09 RX ADMIN — CROMOLYN SODIUM 1 DROP: 40 SOLUTION/ DROPS OPHTHALMIC at 19:47

## 2017-10-09 RX ADMIN — MORPHINE SULFATE 60 MG: 60 TABLET, EXTENDED RELEASE ORAL at 19:45

## 2017-10-09 RX ADMIN — MORPHINE SULFATE 30 MG: 30 TABLET, EXTENDED RELEASE ORAL at 16:49

## 2017-10-09 RX ADMIN — LEVOTHYROXINE SODIUM 224 MCG: 112 TABLET ORAL at 08:13

## 2017-10-09 RX ADMIN — METHOCARBAMOL 750 MG: 750 TABLET ORAL at 08:14

## 2017-10-09 RX ADMIN — LIDOCAINE: 50 OINTMENT TOPICAL at 19:44

## 2017-10-09 RX ADMIN — OXYCODONE HYDROCHLORIDE 30 MG: 30 TABLET ORAL at 13:07

## 2017-10-09 RX ADMIN — ALLOPURINOL 300 MG: 300 TABLET ORAL at 08:15

## 2017-10-09 RX ADMIN — SODIUM CHLORIDE: 9 INJECTION, SOLUTION INTRAVENOUS at 21:29

## 2017-10-09 RX ADMIN — CELECOXIB 200 MG: 200 CAPSULE ORAL at 08:16

## 2017-10-09 RX ADMIN — SENNOSIDES AND DOCUSATE SODIUM 2 TABLET: 8.6; 5 TABLET ORAL at 19:45

## 2017-10-09 RX ADMIN — DOXORUBICIN HYDROCHLORIDE 0.8 MG: 2 INJECTION, SOLUTION INTRAVENOUS at 10:34

## 2017-10-09 RX ADMIN — CETIRIZINE HYDROCHLORIDE 10 MG: 10 TABLET, FILM COATED ORAL at 16:49

## 2017-10-09 RX ADMIN — METHOTREXATE: 25 INJECTION, SOLUTION INTRA-ARTERIAL; INTRAMUSCULAR; INTRATHECAL; INTRAVENOUS at 11:35

## 2017-10-09 RX ADMIN — SODIUM CHLORIDE: 9 INJECTION, SOLUTION INTRAVENOUS at 01:09

## 2017-10-09 RX ADMIN — MONTELUKAST SODIUM 20 MG: 10 TABLET, FILM COATED ORAL at 19:46

## 2017-10-09 RX ADMIN — RANITIDINE 75 MG: 75 TABLET, FILM COATED ORAL at 19:46

## 2017-10-09 RX ADMIN — PREDNISONE 60 MG: 50 TABLET ORAL at 19:46

## 2017-10-09 RX ADMIN — OXYCODONE HYDROCHLORIDE 30 MG: 30 TABLET ORAL at 03:14

## 2017-10-09 RX ADMIN — OXYCODONE HYDROCHLORIDE 30 MG: 30 TABLET ORAL at 08:13

## 2017-10-09 RX ADMIN — POTASSIUM CHLORIDE 20 MEQ: 750 TABLET, EXTENDED RELEASE ORAL at 08:13

## 2017-10-09 RX ADMIN — ETOPOSIDE 100 MG: 20 INJECTION, SOLUTION, CONCENTRATE INTRAVENOUS at 10:33

## 2017-10-09 RX ADMIN — DIAZEPAM 5 MG: 5 TABLET ORAL at 21:10

## 2017-10-09 RX ADMIN — RANITIDINE 75 MG: 75 TABLET, FILM COATED ORAL at 16:49

## 2017-10-09 RX ADMIN — LORAZEPAM 0.5 MG: 0.5 TABLET ORAL at 12:33

## 2017-10-09 RX ADMIN — CALCITRIOL 0.25 MCG: 0.25 CAPSULE, LIQUID FILLED ORAL at 19:38

## 2017-10-09 RX ADMIN — FUROSEMIDE 20 MG: 10 INJECTION, SOLUTION INTRAVENOUS at 16:48

## 2017-10-09 RX ADMIN — LIDOCAINE: 50 OINTMENT TOPICAL at 16:59

## 2017-10-09 RX ADMIN — CROMOLYN SODIUM 1 DROP: 40 SOLUTION/ DROPS OPHTHALMIC at 16:48

## 2017-10-09 RX ADMIN — CETIRIZINE HYDROCHLORIDE 10 MG: 10 TABLET, FILM COATED ORAL at 19:46

## 2017-10-09 RX ADMIN — DIAZEPAM 5 MG: 5 TABLET ORAL at 04:43

## 2017-10-09 RX ADMIN — TACROLIMUS: 1 OINTMENT TOPICAL at 17:06

## 2017-10-09 RX ADMIN — LIDOCAINE HYDROCHLORIDE 5 ML: 10 INJECTION, SOLUTION EPIDURAL; INFILTRATION; INTRACAUDAL; PERINEURAL at 11:35

## 2017-10-09 RX ADMIN — RANITIDINE 75 MG: 75 TABLET, FILM COATED ORAL at 08:14

## 2017-10-09 RX ADMIN — SENNOSIDES AND DOCUSATE SODIUM 2 TABLET: 8.6; 5 TABLET ORAL at 08:15

## 2017-10-09 RX ADMIN — MORPHINE SULFATE 30 MG: 30 TABLET, EXTENDED RELEASE ORAL at 06:06

## 2017-10-09 RX ADMIN — HYDROCHLOROTHIAZIDE 12.5 MG: 12.5 TABLET ORAL at 08:16

## 2017-10-09 RX ADMIN — MONTELUKAST SODIUM 20 MG: 10 TABLET, FILM COATED ORAL at 08:13

## 2017-10-09 RX ADMIN — CROMOLYN SODIUM 1 DROP: 40 SOLUTION/ DROPS OPHTHALMIC at 19:38

## 2017-10-09 RX ADMIN — CETIRIZINE HYDROCHLORIDE 10 MG: 10 TABLET, FILM COATED ORAL at 08:15

## 2017-10-09 RX ADMIN — OXYCODONE HYDROCHLORIDE 30 MG: 30 TABLET ORAL at 21:10

## 2017-10-09 RX ADMIN — DIPHENHYDRAMINE HYDROCHLORIDE 50 MG: 50 CAPSULE ORAL at 21:29

## 2017-10-09 RX ADMIN — CROMOLYN SODIUM 1 DROP: 40 SOLUTION/ DROPS OPHTHALMIC at 08:17

## 2017-10-09 RX ADMIN — CYCLOBENZAPRINE HYDROCHLORIDE 10 MG: 10 TABLET, FILM COATED ORAL at 23:08

## 2017-10-09 RX ADMIN — CELECOXIB 200 MG: 200 CAPSULE ORAL at 19:44

## 2017-10-09 RX ADMIN — METHOCARBAMOL 750 MG: 750 TABLET ORAL at 12:33

## 2017-10-09 RX ADMIN — PREDNISONE 60 MG: 50 TABLET ORAL at 08:12

## 2017-10-09 ASSESSMENT — PAIN DESCRIPTION - DESCRIPTORS
DESCRIPTORS: SQUEEZING;TIGHTNESS
DESCRIPTORS: ACHING
DESCRIPTORS: SQUEEZING
DESCRIPTORS: SQUEEZING

## 2017-10-09 NOTE — TELEPHONE ENCOUNTER
Currently inpatient on 7C, and requesting compounded ketamine-lidocaine cream. Tisha states she has not been able to get this medication as inpatient cannot compound and Dr. Benitez was prescribing this medication on an outpatient basis.     Tisha handed the phone to nurse Kris on the unit who also confirmed they would not be able to get this medication for her.     Contacted [facility] regarding her palliative care request.

## 2017-10-09 NOTE — PLAN OF CARE
Problem: Patient Care Overview  Goal: Plan of Care/Patient Progress Review  Outcome: No Change  Assumed care at 1930. Tmax 99.0, asymptomatic. Day 2 DA-R-EPOCH infusing, tolerating well. Complained of a change in her pain from tightness to hot, burning pressure behind the sternum, MD notified. Pain is managed adequately with 30 mg PO oxy x1, scheduled MS Contin, ketamine ointment, and robaxin. Benadryl given for sleep. Denies nausea. Voiding adequately, medium BM this shift. Up independently. Plan is for IT chemo tomorrow, unknown at what time. Continue with POC.

## 2017-10-09 NOTE — PROGRESS NOTES
[facility], [place] -- Hematology / Oncology Progress Note  Date of Service (when I saw the patient): -- 10/09/2017     Assessment & Plan   Tisha Arias is a 57 F with high grade lymphoma, PMH breast cancer on Tamoxifen s/p  bilateral mastectomies and BENJIE/BSO, papillary thyroid cancer s/p total thyroidectomy, chronic chest wall pain, & asthma who presents for cycle 1 DA-REPOCH.     # High grade B cell lymphoma. Sohail BLEVINS patient diagnosed August 2017, non-GCB with no evidence of c-MYC or BCL6 rearrangement, but with overexpression of c-MYC by immunohistochemistry and mitotic index over 95%.  Disease is localized above the diaphragm in thoracic and paravertebral soft tissue and mediastinal lymphadenopathy. Staging LP and BMBx were negative for lymphoma. She now presents for cycle 1 DA-REPOCH.  -HOLD PTA Dex 4 mg BID with addition of steroid in treatment regimen.  -Allopurinol 300 mg daily.    Treatment Plan. Cycle 1 DA-REPOCH. Day 1=10/6/17.   -Rituximab 375 mg/m2 D1. Complete.  -Etoposide 50 mg/m2 D1-4.  -Vincristine/Doxorubicin 0.4 mg/m2 D1-4.  -Cyclophosphamide 1500 mg D5.  -Prednisone 60 mg BID D1-5.  -Premed: Emend, Zofran 16 mg daily.  -IT PPx Methotrexate. Scheduled for 10/9 at 1100 in XR. Complete.  -Dexamethasone 8 mg D6 and 7.  -Neulasta 24-72 hrs after chemotherapy with twice weekly labs scheduled. Asked Mahi GOVEA to help get this scheduled.  -Prophylactic antibiotics at d/c.     # Stage 1, ER/[place]-positive, HER2-negative breast cancer. Follows with Dr. Crawley. Diagnosed in 2011. Oncotype recurrence score of 11 (7% recurrence risk after 5 years of tamoxifen). S/p bilateral mastectomies and BENJIE/BSO in 2012. For endocrine therapy she was on Arimidex from 3022-2574, then changed to Tamoxifen b/c of arthralgias on Arimidex. Has been on Tamoxifen since 2014.   -Hold Tamoxifen with chemotherapy. Last dose 10/5.      # Papillary thyroid cancer. Follows with Dr. Castro.  Diagnosed in 2013. S/p total thyroidectomy and CND. Received ETOH treatment August 2014, October 2014 and December 2014. Has post-surgical hypothyroidism.  -Continue PTA Levothyroxine.  -Continue PTA calcitriol.     # H/o Rachael en Y gastric bypass. Likely affecting absorption, thus patient is on multiple supplements.  -Continue Ca gluconate, Ca-Vit D, Magnesium.     Chronic Medical Conditions  # Chronic chest wall pain after mastectomy.   -Continue MS Contin 30 mg morning and afternoon and 60 mg at night.  -Oxycodone 30 mg q4hrs prn.  -Celebrex 200 BID.   -Will HOLD Colchicine as interaction with chemo causing increased GI upset and myelosuppression.      # Asthma, allergies. Continue Singulair, Asthmadex, Zyrtec, Triamcinolone cream.    # Anemia:  -continue to monitor, HgB 9.6. Today.     FEN  -IVF NS.  -Regular diet.  -Replace lytes prn.    # Hypokalemia. K stable today at 3.7. Has K+ protocol, & Mg protocol.   # Chronic muscle cramps.   -Continue KCl 20 mEq daily, Robaxin 750 mg QID, Valium 5 mg TID, Flexeril prn.    # Volume overload. Weight is elevated, 88.4 (up from 84.9 on admission, 2+ edema on exam).   # LE edema. Continue HCTZ 12.5 mg daily.  - closely monitor renal function with daily BMP, Cr stable at 0.70.     - 20 lasix, IVF continues r/t concern for TLS.      PPx  -VTE: Lovenox 40 mg daily (hold for platelets <50K), restart tomorrow at 0800 s/p LP.  -GI: Continue Ranitidine 75 mg TID.     Dispo: d/c 10/11/17.    Patient discussed with Dr. Guzman who agrees with assessment and plan.   Mariza Green NP [id number].    Merlene Green  [facility]  [phone]  Hematology/Oncology  October 9, 2017    Interval History  Elvin reports nausea continues to be well controlled.  No constipation/diarrhea.  Continues to eat, no mouth sores.  Up ambulating in room, no dizziness, SOB, CP/pressure.  Agreeable to IT chemo today, last LP went well in neuro XR.  No headache following, reports she actually went to work after.   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  She notes she has been very pleased with RN care here on [facility].  She has questions regarding her treatment plan calendar (so she can make plans with work).  She would also like more information on the markers her tumor expressed.    Physical Exam   Vitals:    10/06/17 0916 10/07/17 0900 10/08/17 0837 10/09/17 0843   Weight: 84.9 kg (187 lb 3.2 oz) 86.1 kg (189 lb 14.4 oz) 87.7 kg (193 lb 5.5 oz) 88.4 kg (194 lb 14.4 oz)     Vital Signs with Ranges  Temp:  [97.1  F (36.2  C)-99  F (37.2  C)] 97.7  F (36.5  C)  Pulse:  [68-91] 68  Heart Rate:  [61-98] 62  Resp:  [16-18] 16  BP: (119-144)/(66-78) 137/73  SpO2:  [95 %-98 %] 97 %  I/O last 3 completed shifts:  In: 542.4 [IV Piggyback:542.4]  Out: 5850 [Urine:5850]    Constitutional: Awake, alert, cooperative, in NAD.  Eyes: PERRL, EOMI, sclera clear, conjunctiva normal.  ENT: Normocephalic, without obvious abnormality, moist mucus membranes, no lesions or thrush.   Respiratory: Non-labored breathing, good air exchange, CTAB, no crackles or wheezing.  Cardiovascular: RRR, no murmur noted.  GI: +BS, soft, non-distended, non-tender, no masses palpated, no hepatosplenomegaly.  Skin: No concerning lesions or rash on exposed areas. Port c/d/i.  Musculoskeletal: 1-2+ ankle/LE edema.  Neurologic: Awake, A&O x 3. Cranial nerves II-XII are grossly intact.   Neuropsychiatric: Calm, normal affect.     MEDS  Medications     - MEDICATION INSTRUCTIONS -       NaCl       - MEDICATION INSTRUCTIONS -       NaCl 50 mL/hr at 10/09/17 0109       HYDROmorphone  1 mg Intravenous Once     Calcium D-Glucarate 1.5g capsules  3 capsule Oral TID     levothyroxine  224 mcg Oral Daily     ondansetron  16 mg Oral Q24H     [START ON 10/11/2017] fosaprepitant (EMEND) 150 mg intermittent infusion  150 mg Intravenous Once     [START ON 10/12/2017] dexamethasone  8 mg Oral Daily     predniSONE  30 mg/m2 (Treatment Plan Recorded) Oral BID     Chemotherapy  Infusing-Continuous Infusion   Does not apply Q8H     etoposide (TOPOSAR) chemo infusion  50 mg/m2 (Treatment Plan Recorded) Intravenous Q24H     vinCRIStine/DOXOrubicin (ONCOVIN/ADRIAMYCIN) chemo infusion  0.4 mg/m2 (Treatment Plan Recorded) Intravenous Q24H     [START ON 10/11/2017] cyclophosphamide (CYTOXAN) chemo infusion  1,500 mg Intravenous Once     [START ON 10/12/2017] filgrastim (NEUPOGEN/GRANIX) intravenous  5 mcg/kg (Treatment Plan Recorded) Intravenous Daily at 8 pm     senna-docusate  2 tablet Oral BID     allopurinol  300 mg Oral Daily     celecoxib  200 mg Oral BID     cetirizine  10 mg Oral TID     Ketamine 6%/Lidocaine 10%/Ketoprofen 10% in PLO cream/gel   Topical BID     cromolyn  1 drop Both Eyes 4x Daily     docusate sodium  100 mg Oral Daily     hydrochlorothiazide  12.5 mg Oral Daily     lidocaine   Topical 4x Daily     lidocaine-prilocaine   Topical Once     montelukast  20 mg Oral BID     morphine  30 mg Oral BID     polyethylene glycol  17 g Oral Daily     potassium chloride SA  20 mEq Oral Daily     ranitidine  75 mg Oral TID     tacrolimus   Topical BID     triamcinolone   Topical BID     sodium chloride (PF)  3 mL Intracatheter Q8H     methocarbamol  750 mg Oral 4x Daily     Muscle Mag capsules  2 tablet Oral TID     Digestzymes capsules  1 capsule Oral Daily     calcitRIOL  0.5 mcg Oral QAM     calcitRIOL  0.25 mcg Oral QPM     heparin lock flush  5-10 mL Intracatheter Q24H     heparin  5 mL Intracatheter Q28 Days     morphine  60 mg Oral QPM       LABS  Recent Labs   Lab Test  10/09/17   0536  10/08/17   0536  10/07/17   0601  10/06/17   1050  10/02/17   0919  09/27/17   1108   WBC  7.3  6.3  5.0  9.1  7.7  9.9   NEUTROPHIL  88.3  85.8  68.7  86.5   --   67.4   HGB  9.6*  9.4*  9.4*  11.2*  10.7*  11.3*   PLT  191  168  162  219  214  232     Recent Labs   Lab Test  10/09/17   0536  10/08/17   0536  10/07/17   0601  10/06/17   1050  10/02/17   0919 09/27/17   1108   NA  143  142  142   139   --   140   POTASSIUM  3.7  4.0  3.6  3.8  3.6  3.4   CHLORIDE  106  107  105  101   --   104   CO2  27  27  29  30   --   30   ANIONGAP  9  8  7  8   --   6   BUN  17  14  17  19   --   15   CR  0.70  0.74  0.77  0.77   --   0.86   ELMO  8.0*  7.5*  7.8*  8.0*   --   8.3*     Recent Labs   Lab Test  10/09/17   0536  10/06/17   1050  10/02/17   0919  09/27/17   1108  04/26/17   1714   05/02/16   1557   MAG  2.0  2.2   --    --    --    --   1.8   PHOS  4.0  3.1   --    --   4.1   < >  3.5   LDH   --   243*   --   206   --    --    --    URIC   --   5.2  5.4  5.0   --    --    --     < > = values in this interval not displayed.     Recent Labs   Lab Test  10/09/17   0536  10/08/17   0536  10/07/17   0601  10/06/17   1050  09/27/17   1108   BILITOTAL  0.3  0.3  0.4  0.4  0.6   ALKPHOS  57  54  55  69  66   ALT  25  25  23  25  26   AST  18  18  16  22  12   ALBUMIN  2.9*  2.6*  2.8*  3.4  3.7   LDH   --    --    --   243*  206     CULTURES    Recent Labs   Lab Test  07/12/16   0712  05/06/13   1038  03/01/12   1303   CULT  Culture negative after 4 weeks  Moderate growth Normal skin jacky  <10,000 colonies/mL Gram negative rods No further identification 50 to 100,000 colonies/mL Mixed gram positive jacky No Beta hemolytic Streptococcus  No anaerobes isolated  Light growth Coagulase negative Staphylococcus   SDES  Skin Chest  Skin Chest  Skin Chest  Midstream Urine  Fluid SEROMA OF RIGHT BREAST  Fluid SEROMA OF RIGHT BREAST  Fluid SEROMA OF RIGHT BREAST       IMAGING  No results found for this or any previous visit (from the past 24 hour(s)).    Patient has been seen, examined and evaluated by me independently. I have reviewed today's vital signs, medications, labs and imaging results. I have discussed the plan with the patient and her   PE  Alert, oriented x3  In no acute distress  Vitals are stable  CVS and Pulmonary systems findings are normal  Labs  CBC and chemistry are not significant  Lab Results    Component Value Date    WBC 7.3 10/09/2017     Lab Results   Component Value Date    RBC 3.97 10/09/2017     Lab Results   Component Value Date    HGB 9.6 10/09/2017     Lab Results   Component Value Date    HCT 32.5 10/09/2017     No components found for: MCT  Lab Results   Component Value Date    MCV 82 10/09/2017     Lab Results   Component Value Date    MCH 24.2 10/09/2017     Lab Results   Component Value Date    MCHC 29.5 10/09/2017     Lab Results   Component Value Date    RDW 15.4 10/09/2017     Lab Results   Component Value Date     10/09/2017       Last Basic Metabolic Panel:  Lab Results   Component Value Date     10/09/2017      Lab Results   Component Value Date    POTASSIUM 3.7 10/09/2017     Lab Results   Component Value Date    CHLORIDE 106 10/09/2017     Lab Results   Component Value Date    ELMO 8.0 10/09/2017     Lab Results   Component Value Date    CO2 27 10/09/2017     Lab Results   Component Value Date    BUN 17 10/09/2017     Lab Results   Component Value Date    CR 0.70 10/09/2017     Lab Results   Component Value Date     10/09/2017         We went over why this regimen is chosen, the goal therapy (cure), what is her stage (noted her BM biopsy neg result), expected course in coming 2-3 weeks    Today IT chemo (dose 1), she has h/o migraine, understandable worried    Afebrile    Oral intake is fine, mobile/active    Preeti Guzman MD

## 2017-10-09 NOTE — PLAN OF CARE
VSS. LP done downstairs, IT chemo given. Site CDI. Pain continues, unchanged from baseline.  Dose # 3 Etoposide, Dox, vincristine infusing. Eating and voiding adequate amounts. Continue with POC.

## 2017-10-09 NOTE — TELEPHONE ENCOUNTER
Conversation with patients bedside RN who states that patient was almost out of her  compounded Ketamine cream that she had bought into hospital, and asked whether we could assist with procuring more for her. I called the compounding pharmacy however they report that patients insurance had denied the medication when they tried to fill the script. This was reported to Kris, I will inform Radha Gonzalez RN palliative care to assist with insurance authorization. I recommended that Kris asked for an inpatient palliative care consult to assist with pain control.

## 2017-10-09 NOTE — PLAN OF CARE
"Problem: Patient Care Overview  Goal: Plan of Care/Patient Progress Review  /70 (BP Location: Left arm)  Pulse 68  Temp 97.7  F (36.5  C) (Oral)  Resp 18  Ht 1.651 m (5' 5\")  Wt 87.7 kg (193 lb 5.5 oz)  SpO2 95%  BMI 32.17 kg/m2      A&Ox4, Afebrile, AVSS on RA. Up independently in room. Denies nausea. NS infusing via port a cath @ 50mL/hr; chemo also infusing via port, see MAR. Good fluid intake, voiding in large amounts, no BM this shift. Pt c/o squeezing, tight pain across chest, radiating to back; pain managed w/ PRN oxycodone, scheduled MSContin, and 1-time dose of IV Dilaudid 1mg. Declined last dose of compounded ketamine ointment, wanted to save it. Pt was very anxious this shift, concerned about running out of her compounded ketamine ointment, as inpatient pharmacy reports they are unable to compound this formula. Pharmacy consulted, MDs aware. Plan is for IT chemo tomorrow, time not scheduled yet. Continue to monitor and follow POC.          "

## 2017-10-10 LAB
ALBUMIN SERPL-MCNC: 2.9 G/DL (ref 3.4–5)
ALP SERPL-CCNC: 51 U/L (ref 40–150)
ALT SERPL W P-5'-P-CCNC: 25 U/L (ref 0–50)
ANION GAP SERPL CALCULATED.3IONS-SCNC: 9 MMOL/L (ref 3–14)
AST SERPL W P-5'-P-CCNC: 14 U/L (ref 0–45)
BASOPHILS # BLD AUTO: 0 10E9/L (ref 0–0.2)
BASOPHILS NFR BLD AUTO: 0 %
BILIRUB SERPL-MCNC: 0.6 MG/DL (ref 0.2–1.3)
BUN SERPL-MCNC: 20 MG/DL (ref 7–30)
CALCIUM SERPL-MCNC: 8.1 MG/DL (ref 8.5–10.1)
CHLORIDE SERPL-SCNC: 104 MMOL/L (ref 94–109)
CO2 SERPL-SCNC: 30 MMOL/L (ref 20–32)
COPATH REPORT: NORMAL
CREAT SERPL-MCNC: 0.73 MG/DL (ref 0.52–1.04)
DIFFERENTIAL METHOD BLD: ABNORMAL
EOSINOPHIL # BLD AUTO: 0 10E9/L (ref 0–0.7)
EOSINOPHIL NFR BLD AUTO: 0 %
ERYTHROCYTE [DISTWIDTH] IN BLOOD BY AUTOMATED COUNT: 15.4 % (ref 10–15)
GFR SERPL CREATININE-BSD FRML MDRD: 82 ML/MIN/1.7M2
GLUCOSE SERPL-MCNC: 120 MG/DL (ref 70–99)
HCT VFR BLD AUTO: 33.1 % (ref 35–47)
HGB BLD-MCNC: 9.8 G/DL (ref 11.7–15.7)
IMM GRANULOCYTES # BLD: 0 10E9/L (ref 0–0.4)
IMM GRANULOCYTES NFR BLD: 0.2 %
LYMPHOCYTES # BLD AUTO: 0.9 10E9/L (ref 0.8–5.3)
LYMPHOCYTES NFR BLD AUTO: 15.2 %
MCH RBC QN AUTO: 24.4 PG (ref 26.5–33)
MCHC RBC AUTO-ENTMCNC: 29.6 G/DL (ref 31.5–36.5)
MCV RBC AUTO: 82 FL (ref 78–100)
MONOCYTES # BLD AUTO: 0.2 10E9/L (ref 0–1.3)
MONOCYTES NFR BLD AUTO: 2.8 %
NEUTROPHILS # BLD AUTO: 4.9 10E9/L (ref 1.6–8.3)
NEUTROPHILS NFR BLD AUTO: 81.8 %
NRBC # BLD AUTO: 0 10*3/UL
NRBC BLD AUTO-RTO: 0 /100
PLATELET # BLD AUTO: 186 10E9/L (ref 150–450)
POTASSIUM SERPL-SCNC: 3.6 MMOL/L (ref 3.4–5.3)
PROT SERPL-MCNC: 5.7 G/DL (ref 6.8–8.8)
RBC # BLD AUTO: 4.02 10E12/L (ref 3.8–5.2)
SODIUM SERPL-SCNC: 142 MMOL/L (ref 133–144)
WBC # BLD AUTO: 6 10E9/L (ref 4–11)

## 2017-10-10 PROCEDURE — 87040 BLOOD CULTURE FOR BACTERIA: CPT | Performed by: PHYSICIAN ASSISTANT

## 2017-10-10 PROCEDURE — 93010 ELECTROCARDIOGRAM REPORT: CPT | Performed by: INTERNAL MEDICINE

## 2017-10-10 PROCEDURE — 25000128 H RX IP 250 OP 636: Performed by: NURSE PRACTITIONER

## 2017-10-10 PROCEDURE — 25000128 H RX IP 250 OP 636

## 2017-10-10 PROCEDURE — 36592 COLLECT BLOOD FROM PICC: CPT | Performed by: PHYSICIAN ASSISTANT

## 2017-10-10 PROCEDURE — 25000132 ZZH RX MED GY IP 250 OP 250 PS 637

## 2017-10-10 PROCEDURE — 12000008 ZZH R&B INTERMEDIATE UMMC

## 2017-10-10 PROCEDURE — 93005 ELECTROCARDIOGRAM TRACING: CPT

## 2017-10-10 PROCEDURE — 25000128 H RX IP 250 OP 636: Performed by: PHYSICIAN ASSISTANT

## 2017-10-10 PROCEDURE — 85025 COMPLETE CBC W/AUTO DIFF WBC: CPT | Performed by: PHYSICIAN ASSISTANT

## 2017-10-10 PROCEDURE — 80053 COMPREHEN METABOLIC PANEL: CPT | Performed by: PHYSICIAN ASSISTANT

## 2017-10-10 PROCEDURE — 25000132 ZZH RX MED GY IP 250 OP 250 PS 637: Performed by: INTERNAL MEDICINE

## 2017-10-10 PROCEDURE — 25000125 ZZHC RX 250

## 2017-10-10 PROCEDURE — 25000131 ZZH RX MED GY IP 250 OP 636 PS 637

## 2017-10-10 PROCEDURE — S0169 CALCITROL: HCPCS | Performed by: INTERNAL MEDICINE

## 2017-10-10 PROCEDURE — 25000132 ZZH RX MED GY IP 250 OP 250 PS 637: Performed by: PHYSICIAN ASSISTANT

## 2017-10-10 RX ORDER — DIPHENHYDRAMINE HYDROCHLORIDE 50 MG/ML
25 INJECTION INTRAMUSCULAR; INTRAVENOUS ONCE
Status: COMPLETED | OUTPATIENT
Start: 2017-10-10 | End: 2017-10-10

## 2017-10-10 RX ORDER — MEPERIDINE HYDROCHLORIDE 25 MG/ML
25 INJECTION INTRAMUSCULAR; INTRAVENOUS; SUBCUTANEOUS ONCE
Status: COMPLETED | OUTPATIENT
Start: 2017-10-10 | End: 2017-10-10

## 2017-10-10 RX ORDER — DEXAMETHASONE SODIUM PHOSPHATE 4 MG/ML
8 INJECTION, SOLUTION INTRA-ARTICULAR; INTRALESIONAL; INTRAMUSCULAR; INTRAVENOUS; SOFT TISSUE ONCE
Status: COMPLETED | OUTPATIENT
Start: 2017-10-10 | End: 2017-10-10

## 2017-10-10 RX ADMIN — CROMOLYN SODIUM 1 DROP: 40 SOLUTION/ DROPS OPHTHALMIC at 19:28

## 2017-10-10 RX ADMIN — SENNOSIDES AND DOCUSATE SODIUM 2 TABLET: 8.6; 5 TABLET ORAL at 07:45

## 2017-10-10 RX ADMIN — OXYCODONE HYDROCHLORIDE 30 MG: 30 TABLET ORAL at 05:41

## 2017-10-10 RX ADMIN — CETIRIZINE HYDROCHLORIDE 10 MG: 10 TABLET, FILM COATED ORAL at 07:45

## 2017-10-10 RX ADMIN — DOCUSATE SODIUM 100 MG: 100 CAPSULE, LIQUID FILLED ORAL at 07:47

## 2017-10-10 RX ADMIN — TRIAMCINOLONE ACETONIDE: 0.25 OINTMENT TOPICAL at 19:31

## 2017-10-10 RX ADMIN — RANITIDINE 75 MG: 75 TABLET, FILM COATED ORAL at 07:44

## 2017-10-10 RX ADMIN — POTASSIUM CHLORIDE 20 MEQ: 750 TABLET, EXTENDED RELEASE ORAL at 07:48

## 2017-10-10 RX ADMIN — METHOCARBAMOL 750 MG: 750 TABLET ORAL at 05:42

## 2017-10-10 RX ADMIN — OXYCODONE HYDROCHLORIDE 30 MG: 30 TABLET ORAL at 21:09

## 2017-10-10 RX ADMIN — METHOCARBAMOL 750 MG: 750 TABLET ORAL at 16:00

## 2017-10-10 RX ADMIN — CYCLOBENZAPRINE HYDROCHLORIDE 10 MG: 10 TABLET, FILM COATED ORAL at 08:50

## 2017-10-10 RX ADMIN — TRIAMCINOLONE ACETONIDE: 0.25 OINTMENT TOPICAL at 07:49

## 2017-10-10 RX ADMIN — DIPHENHYDRAMINE HYDROCHLORIDE 25 MG: 50 INJECTION, SOLUTION INTRAMUSCULAR; INTRAVENOUS at 20:27

## 2017-10-10 RX ADMIN — MONTELUKAST SODIUM 20 MG: 10 TABLET, FILM COATED ORAL at 19:25

## 2017-10-10 RX ADMIN — CALCITRIOL 0.5 MCG: 0.5 CAPSULE, LIQUID FILLED ORAL at 07:45

## 2017-10-10 RX ADMIN — METHOCARBAMOL 750 MG: 750 TABLET ORAL at 19:26

## 2017-10-10 RX ADMIN — OXYCODONE HYDROCHLORIDE 30 MG: 30 TABLET ORAL at 09:35

## 2017-10-10 RX ADMIN — OXYCODONE HYDROCHLORIDE 30 MG: 30 TABLET ORAL at 01:08

## 2017-10-10 RX ADMIN — SODIUM CHLORIDE: 9 INJECTION, SOLUTION INTRAVENOUS at 16:37

## 2017-10-10 RX ADMIN — METHOCARBAMOL 750 MG: 750 TABLET ORAL at 12:16

## 2017-10-10 RX ADMIN — CETIRIZINE HYDROCHLORIDE 10 MG: 10 TABLET, FILM COATED ORAL at 13:09

## 2017-10-10 RX ADMIN — CROMOLYN SODIUM 1 DROP: 40 SOLUTION/ DROPS OPHTHALMIC at 16:00

## 2017-10-10 RX ADMIN — MONTELUKAST SODIUM 20 MG: 10 TABLET, FILM COATED ORAL at 07:45

## 2017-10-10 RX ADMIN — ENOXAPARIN SODIUM 40 MG: 40 INJECTION SUBCUTANEOUS at 07:50

## 2017-10-10 RX ADMIN — CALCITRIOL 0.25 MCG: 0.25 CAPSULE, LIQUID FILLED ORAL at 19:25

## 2017-10-10 RX ADMIN — POLYETHYLENE GLYCOL 3350 17 G: 17 POWDER, FOR SOLUTION ORAL at 16:38

## 2017-10-10 RX ADMIN — TACROLIMUS: 1 OINTMENT TOPICAL at 07:50

## 2017-10-10 RX ADMIN — DOXORUBICIN HYDROCHLORIDE 0.8 MG: 2 INJECTION, SOLUTION INTRAVENOUS at 09:38

## 2017-10-10 RX ADMIN — MORPHINE SULFATE 30 MG: 30 TABLET, EXTENDED RELEASE ORAL at 05:42

## 2017-10-10 RX ADMIN — TACROLIMUS: 1 OINTMENT TOPICAL at 19:31

## 2017-10-10 RX ADMIN — LEVOTHYROXINE SODIUM 224 MCG: 112 TABLET ORAL at 07:46

## 2017-10-10 RX ADMIN — CROMOLYN SODIUM 1 DROP: 40 SOLUTION/ DROPS OPHTHALMIC at 07:49

## 2017-10-10 RX ADMIN — OXYCODONE HYDROCHLORIDE 30 MG: 30 TABLET ORAL at 16:00

## 2017-10-10 RX ADMIN — SENNOSIDES AND DOCUSATE SODIUM 2 TABLET: 8.6; 5 TABLET ORAL at 19:25

## 2017-10-10 RX ADMIN — PREDNISONE 60 MG: 50 TABLET ORAL at 19:26

## 2017-10-10 RX ADMIN — DEXAMETHASONE SODIUM PHOSPHATE 8 MG: 4 INJECTION, SOLUTION INTRAMUSCULAR; INTRAVENOUS at 20:27

## 2017-10-10 RX ADMIN — RANITIDINE 75 MG: 75 TABLET, FILM COATED ORAL at 19:25

## 2017-10-10 RX ADMIN — CELECOXIB 200 MG: 200 CAPSULE ORAL at 07:46

## 2017-10-10 RX ADMIN — ETOPOSIDE 100 MG: 20 INJECTION, SOLUTION, CONCENTRATE INTRAVENOUS at 08:41

## 2017-10-10 RX ADMIN — ALLOPURINOL 300 MG: 300 TABLET ORAL at 07:47

## 2017-10-10 RX ADMIN — LIDOCAINE: 50 OINTMENT TOPICAL at 19:30

## 2017-10-10 RX ADMIN — DIAZEPAM 5 MG: 5 TABLET ORAL at 05:41

## 2017-10-10 RX ADMIN — MORPHINE SULFATE 60 MG: 60 TABLET, EXTENDED RELEASE ORAL at 19:27

## 2017-10-10 RX ADMIN — ONDANSETRON HYDROCHLORIDE 16 MG: 8 TABLET, FILM COATED ORAL at 07:46

## 2017-10-10 RX ADMIN — MORPHINE SULFATE 30 MG: 30 TABLET, EXTENDED RELEASE ORAL at 13:09

## 2017-10-10 RX ADMIN — LIDOCAINE: 50 OINTMENT TOPICAL at 16:04

## 2017-10-10 RX ADMIN — CELECOXIB 200 MG: 200 CAPSULE ORAL at 19:26

## 2017-10-10 RX ADMIN — RANITIDINE 75 MG: 75 TABLET, FILM COATED ORAL at 13:09

## 2017-10-10 RX ADMIN — HYDROCHLOROTHIAZIDE 12.5 MG: 12.5 TABLET ORAL at 07:47

## 2017-10-10 RX ADMIN — MEPERIDINE HYDROCHLORIDE 25 MG: 25 INJECTION, SOLUTION INTRAMUSCULAR; INTRAVENOUS; SUBCUTANEOUS at 19:28

## 2017-10-10 RX ADMIN — CETIRIZINE HYDROCHLORIDE 10 MG: 10 TABLET, FILM COATED ORAL at 19:34

## 2017-10-10 RX ADMIN — PREDNISONE 60 MG: 50 TABLET ORAL at 07:46

## 2017-10-10 ASSESSMENT — PAIN DESCRIPTION - DESCRIPTORS
DESCRIPTORS: ACHING
DESCRIPTORS: ACHING
DESCRIPTORS: POUNDING;SQUEEZING
DESCRIPTORS: SQUEEZING

## 2017-10-10 NOTE — PLAN OF CARE
Problem: Chemotherapy Effects (Adult)  Goal: Signs and Symptoms of Listed Potential Problems Will be Absent, Minimized or Managed (Chemotherapy Effects)  Signs and symptoms of listed potential problems will be absent, minimized or managed by discharge/transition of care (reference Chemotherapy Effects (Adult) CPG).   Outcome: No Change  A&O. VSS. Oxy & Valium for chronic back pain. Etoposide, Dox, vincristine infusing. NS @ 50mL/hr. Independent. Will continue w/POC.

## 2017-10-10 NOTE — PROGRESS NOTES
[facility], [place] -- Hematology / Oncology Progress Note  Date of Service (when I saw the patient): -- 10/10/2017     Assessment & Plan   Tisha Arias is a 57 F with high grade lymphoma, PMH breast cancer on Tamoxifen s/p  bilateral mastectomies and BENJIE/BSO, papillary thyroid cancer s/p total thyroidectomy, chronic chest wall pain, & asthma who presents for cycle 1 DA-REPOCH.     # High grade B cell lymphoma. Sohail BLEVINS patient diagnosed August 2017, non-GCB with no evidence of c-MYC or BCL6 rearrangement, but with overexpression of c-MYC by immunohistochemistry and mitotic index over 95%.  Disease is localized above the diaphragm in thoracic and paravertebral soft tissue and mediastinal lymphadenopathy. Staging LP and BMBx were negative for lymphoma. She now presents for cycle 1 DA-REPOCH.  -HOLD PTA Dex 4 mg BID with addition of steroid in treatment regimen.  -Allopurinol 300 mg daily.    Treatment Plan. Cycle 1 DA-REPOCH. Day 1=10/6/17. Today is D4.  -Rituximab 375 mg/m2 D1. Complete.  -Etoposide 50 mg/m2 D1-4.  -Vincristine/Doxorubicin 0.4 mg/m2 D1-4.  -Cyclophosphamide 1500 mg D5.  -Prednisone 60 mg BID D1-5.  -Premed: Emend, Zofran 16 mg daily.  -IT PPx Methotrexate. Scheduled for 10/9 at 1100 in XR. Complete (Flow negative for definitive B cell population, 1 RBC, 0 WBC, glucose 76, protein 47).  -Dexamethasone 8 mg D6 and 7.  -Neulasta & labs (10/12), labs (10/17), labs, SANJANA & possible transfusion (10/19).   -Prophylactic antibiotics at d/c (ACY, FLUC, LEVAQUIN).     # Stage 1, ER/[place]-positive, HER2-negative breast cancer. Follows with Dr. Crawley. Diagnosed in 2011. Oncotype recurrence score of 11 (7% recurrence risk after 5 years of tamoxifen). S/p bilateral mastectomies and BENJIE/BSO in 2012. For endocrine therapy she was on Arimidex from 6872-2399, then changed to Tamoxifen b/c of arthralgias on Arimidex. Has been on Tamoxifen since 2014.   -Hold Tamoxifen with  chemotherapy. Last dose 10/5.   -F/U with Misbah BLEVINS if 11/27/17.     # Papillary thyroid cancer. Follows with Dr. Castro. Diagnosed in 2013. S/p total thyroidectomy and CND. Received ETOH treatment August 2014, October 2014 and December 2014. Has post-surgical hypothyroidism.  -Continue PTA Levothyroxine.  -Continue PTA calcitriol.     # H/o Rachael en Y gastric bypass. Likely affecting absorption, thus patient is on multiple supplements.  -Continue Ca gluconate, Ca-Vit D, Magnesium.     Chronic Medical Conditions  # Chronic chest wall pain after mastectomy.   -Continue MS Contin 30 mg morning and afternoon and 60 mg at night.  -Oxycodone 30 mg q4hrs prn.  -Celebrex 200 BID.   -Will HOLD Colchicine as interaction with chemo causing increased GI upset and myelosuppression.      # Asthma, allergies. Continue Singulair, Asthmadex, Zyrtec, Triamcinolone cream.    # Anemia:  -continue to monitor, HgB 9.8. Today.     FEN  -IVF NS 50 ml/hr.   -Regular diet.  -Replace lytes prn.    # Hypokalemia. K stable today at 3.6. Has K+ protocol, & Mg protocol.   # Chronic muscle cramps.   -Continue KCl 20 mEq daily, Robaxin 750 mg QID, Valium 5 mg TID, Flexeril prn.    # Volume overload.   # LE edema. Edema is stable on exam. Continue HCTZ 12.5 mg daily.  - closely monitor renal function with daily BMP, Cr stable at 0.73.     - 20 lasix on 10/9 with good response, weight down slightly today 87.5 from 88.4 patient comfortable, IVF continues r/t concern for TLS.   - Per I&O net negative 5.3 L yesterday.     PPx  -VTE: Lovenox 40 mg daily (hold for platelets <50K), restart tomorrow at 0800 s/p LP.  -GI: Continue Ranitidine 75 mg TID.     Dispo: d/c 10/11/17.    Patient discussed with Dr. Lama who agrees with assessment and plan.   Mariza Green NP [id number].    Merlene Green  [facility]  [phone]  Hematology/Oncology  October 10, 2017    Interval History  Elvin reports she had a great morning and excellent night sleep. She may have  overdone it this AM, now having flare of chronic pain.  Somewhat better with Flexeril but waiting on PRN oxycodone.  She reports no BM today but took Senna. No fever, chills, night sweats.  Good appetite.  Lungs clear, no cough/SOB.  No dizziness with ambulation. No issues with port.    Physical Exam   Vitals:    10/06/17 0916 10/07/17 0900 10/08/17 0837 10/09/17 0843   Weight: 84.9 kg (187 lb 3.2 oz) 86.1 kg (189 lb 14.4 oz) 87.7 kg (193 lb 5.5 oz) 88.4 kg (194 lb 14.4 oz)    10/10/17 0740   Weight: 87.5 kg (192 lb 14.4 oz)     Vital Signs with Ranges  Temp:  [97.1  F (36.2  C)-98.7  F (37.1  C)] 98.7  F (37.1  C)  Pulse:  [61-81] 67  Heart Rate:  [73-85] 81  Resp:  [16-18] 18  BP: (112-155)/(64-82) 155/76  SpO2:  [91 %-98 %] 98 %  I/O last 3 completed shifts:  In: 2180 [P.O.:240; I.V.:820; IV Piggyback:1120]  Out: 4750 [Urine:4750]    Constitutional: Awake, alert, cooperative, in NAD. Endorses chronic pain 2/2 to too much activity this AM.  Able to sit up at bedside for exam.  Eyes: PERRL, EOMI, sclera clear, conjunctiva normal.  ENT: Normocephalic, without obvious abnormality, moist mucus membranes, no lesions or thrush.   Respiratory: Non-labored breathing, good air exchange, CTAB, no crackles or wheezing.  Cardiovascular: RRR, no murmur noted.  GI: +BS, soft, non-distended, non-tender, no masses palpated, no hepatosplenomegaly.  Skin: No concerning lesions or rash on exposed areas. Port c/d/i.  Musculoskeletal: 2+ ankle/LE edema.  Neurologic: Awake, A&O x 3. Cranial nerves II-XII are grossly intact.   Neuropsychiatric: Calm, normal affect.     MEDS  Medications     - MEDICATION INSTRUCTIONS -       NaCl       - MEDICATION INSTRUCTIONS -       NaCl 50 mL/hr at 10/09/17 2129       HYDROmorphone  1 mg Intravenous Once     enoxaparin  40 mg Subcutaneous Q24H     Calcium D-Glucarate 1.5g capsules  3 capsule Oral TID     levothyroxine  224 mcg Oral Daily     ondansetron  16 mg Oral Q24H     [START ON 10/11/2017]  fosaprepitant (EMEND) 150 mg intermittent infusion  150 mg Intravenous Once     [START ON 10/12/2017] dexamethasone  8 mg Oral Daily     predniSONE  30 mg/m2 (Treatment Plan Recorded) Oral BID     Chemotherapy Infusing-Continuous Infusion   Does not apply Q8H     etoposide (TOPOSAR) chemo infusion  50 mg/m2 (Treatment Plan Recorded) Intravenous Q24H     vinCRIStine/DOXOrubicin (ONCOVIN/ADRIAMYCIN) chemo infusion  0.4 mg/m2 (Treatment Plan Recorded) Intravenous Q24H     [START ON 10/11/2017] cyclophosphamide (CYTOXAN) chemo infusion  1,500 mg Intravenous Once     [START ON 10/12/2017] filgrastim (NEUPOGEN/GRANIX) intravenous  5 mcg/kg (Treatment Plan Recorded) Intravenous Daily at 8 pm     senna-docusate  2 tablet Oral BID     allopurinol  300 mg Oral Daily     celecoxib  200 mg Oral BID     cetirizine  10 mg Oral TID     Ketamine 6%/Lidocaine 10%/Ketoprofen 10% in PLO cream/gel   Topical BID     cromolyn  1 drop Both Eyes 4x Daily     docusate sodium  100 mg Oral Daily     hydrochlorothiazide  12.5 mg Oral Daily     lidocaine   Topical 4x Daily     lidocaine-prilocaine   Topical Once     montelukast  20 mg Oral BID     morphine  30 mg Oral BID     polyethylene glycol  17 g Oral Daily     potassium chloride SA  20 mEq Oral Daily     ranitidine  75 mg Oral TID     tacrolimus   Topical BID     triamcinolone   Topical BID     sodium chloride (PF)  3 mL Intracatheter Q8H     methocarbamol  750 mg Oral 4x Daily     Muscle Mag capsules  2 tablet Oral TID     Digestzymes capsules  1 capsule Oral Daily     calcitRIOL  0.5 mcg Oral QAM     calcitRIOL  0.25 mcg Oral QPM     heparin lock flush  5-10 mL Intracatheter Q24H     heparin  5 mL Intracatheter Q28 Days     morphine  60 mg Oral QPM     LABS  Recent Labs   Lab Test  10/09/17   0536  10/08/17   0536  10/07/17   0601  10/06/17   1050  10/02/17   0919  09/27/17   1108   WBC  7.3  6.3  5.0  9.1  7.7  9.9   NEUTROPHIL  88.3  85.8  68.7  86.5   --   67.4   HGB  9.6*  9.4*   9.4*  11.2*  10.7*  11.3*   PLT  191  168  162  219  214  232     Recent Labs   Lab Test  10/09/17   0536  10/08/17   0536  10/07/17   0601  10/06/17   1050  10/02/17   0919  09/27/17   1108   NA  143  142  142  139   --   140   POTASSIUM  3.7  4.0  3.6  3.8  3.6  3.4   CHLORIDE  106  107  105  101   --   104   CO2  27  27  29  30   --   30   ANIONGAP  9  8  7  8   --   6   BUN  17  14  17  19   --   15   CR  0.70  0.74  0.77  0.77   --   0.86   ELMO  8.0*  7.5*  7.8*  8.0*   --   8.3*     Recent Labs   Lab Test  10/09/17   0536  10/06/17   1050  10/02/17   0919  09/27/17   1108  04/26/17   1714   05/02/16   1557   MAG  2.0  2.2   --    --    --    --   1.8   PHOS  4.0  3.1   --    --   4.1   < >  3.5   LDH   --   243*   --   206   --    --    --    URIC   --   5.2  5.4  5.0   --    --    --     < > = values in this interval not displayed.     Recent Labs   Lab Test  10/09/17   0536  10/08/17   0536  10/07/17   0601  10/06/17   1050  09/27/17   1108   BILITOTAL  0.3  0.3  0.4  0.4  0.6   ALKPHOS  57  54  55  69  66   ALT  25  25  23  25  26   AST  18  18  16  22  12   ALBUMIN  2.9*  2.6*  2.8*  3.4  3.7   LDH   --    --    --   243*  206     CULTURES    Recent Labs   Lab Test  07/12/16   0712  05/06/13   1038  03/01/12   1303   CULT  Culture negative after 4 weeks  Moderate growth Normal skin jacky  <10,000 colonies/mL Gram negative rods No further identification 50 to 100,000 colonies/mL Mixed gram positive jacky No Beta hemolytic Streptococcus  No anaerobes isolated  Light growth Coagulase negative Staphylococcus   SDES  Skin Chest  Skin Chest  Skin Chest  Midstream Urine  Fluid SEROMA OF RIGHT BREAST  Fluid SEROMA OF RIGHT BREAST  Fluid SEROMA OF RIGHT BREAST     IMAGING  No results found for this or any previous visit (from the past 24 hour(s)).

## 2017-10-10 NOTE — PLAN OF CARE
Nursing Focus: Chemotherapy  D: Positive blood return via Port. Insertion site is clean/dry/intact, dressing intact with no complaints of pain.  Urine output is recorded in intake in Doc Flowsheet.    I: Premedications given per order (see electronic medical administration record). CIVI dose 4 Etoposide and  Dox/vincristine started to infuse over 24 hours. Reviewed pt teaching on chemotherapy side effects.  Pt denies need for further teaching. Chemotherapy double checked per protocol by two chemotherapy competent RN's.   A: Tolerating procedure well. Denies nausea and or pain.   P: Continue to monitor urine output and symptoms of nausea. Screen for symptoms of toxicity.    Pain well controlled with current medication regimen. Up walking the halls. Voiding well. Eager to discharge tomorrow. Continue with POC.

## 2017-10-10 NOTE — PLAN OF CARE
Problem: Chemotherapy Effects (Adult)  Goal: Signs and Symptoms of Listed Potential Problems Will be Absent, Minimized or Managed (Chemotherapy Effects)  Signs and symptoms of listed potential problems will be absent, minimized or managed by discharge/transition of care (reference Chemotherapy Effects (Adult) CPG).   Outcome: No Change  Day 3 DA-R-EPOCH. VSS, R port patent with CIVI chemo and NS at 50 cc/hr. Oxy q4h and valium q8h for chronic sternal and mid back pain. LP site c,d&i. Slight BLE edema. IV lasix given X1. Pt would like to know if her home OTC leg cramp med will interfere with chemo-box in her med drawer for day team to assess.

## 2017-10-11 VITALS
HEART RATE: 74 BPM | OXYGEN SATURATION: 96 % | SYSTOLIC BLOOD PRESSURE: 134 MMHG | WEIGHT: 193.78 LBS | RESPIRATION RATE: 18 BRPM | DIASTOLIC BLOOD PRESSURE: 82 MMHG | TEMPERATURE: 98.5 F | HEIGHT: 65 IN | BODY MASS INDEX: 32.29 KG/M2

## 2017-10-11 LAB
ALBUMIN SERPL-MCNC: 3.1 G/DL (ref 3.4–5)
ALP SERPL-CCNC: 57 U/L (ref 40–150)
ALT SERPL W P-5'-P-CCNC: 29 U/L (ref 0–50)
ANION GAP SERPL CALCULATED.3IONS-SCNC: 10 MMOL/L (ref 3–14)
AST SERPL W P-5'-P-CCNC: 12 U/L (ref 0–45)
BASOPHILS # BLD AUTO: 0 10E9/L (ref 0–0.2)
BASOPHILS NFR BLD AUTO: 0 %
BILIRUB SERPL-MCNC: 0.5 MG/DL (ref 0.2–1.3)
BUN SERPL-MCNC: 17 MG/DL (ref 7–30)
CALCIUM SERPL-MCNC: 8.2 MG/DL (ref 8.5–10.1)
CHLORIDE SERPL-SCNC: 103 MMOL/L (ref 94–109)
CO2 SERPL-SCNC: 27 MMOL/L (ref 20–32)
CREAT SERPL-MCNC: 0.72 MG/DL (ref 0.52–1.04)
DIFFERENTIAL METHOD BLD: ABNORMAL
EOSINOPHIL # BLD AUTO: 0 10E9/L (ref 0–0.7)
EOSINOPHIL NFR BLD AUTO: 0 %
ERYTHROCYTE [DISTWIDTH] IN BLOOD BY AUTOMATED COUNT: 15.1 % (ref 10–15)
GFR SERPL CREATININE-BSD FRML MDRD: 84 ML/MIN/1.7M2
GLUCOSE SERPL-MCNC: 234 MG/DL (ref 70–99)
HCT VFR BLD AUTO: 35.3 % (ref 35–47)
HGB BLD-MCNC: 10.8 G/DL (ref 11.7–15.7)
IMM GRANULOCYTES # BLD: 0 10E9/L (ref 0–0.4)
IMM GRANULOCYTES NFR BLD: 0 %
INTERPRETATION ECG - MUSE: NORMAL
LYMPHOCYTES # BLD AUTO: 0.4 10E9/L (ref 0.8–5.3)
LYMPHOCYTES NFR BLD AUTO: 7.4 %
MAGNESIUM SERPL-MCNC: 2.2 MG/DL (ref 1.6–2.3)
MCH RBC QN AUTO: 24.5 PG (ref 26.5–33)
MCHC RBC AUTO-ENTMCNC: 30.6 G/DL (ref 31.5–36.5)
MCV RBC AUTO: 80 FL (ref 78–100)
MONOCYTES # BLD AUTO: 0 10E9/L (ref 0–1.3)
MONOCYTES NFR BLD AUTO: 0.4 %
NEUTROPHILS # BLD AUTO: 5.1 10E9/L (ref 1.6–8.3)
NEUTROPHILS NFR BLD AUTO: 92.2 %
NRBC # BLD AUTO: 0 10*3/UL
NRBC BLD AUTO-RTO: 0 /100
PHOSPHATE SERPL-MCNC: 3.9 MG/DL (ref 2.5–4.5)
PLATELET # BLD AUTO: 230 10E9/L (ref 150–450)
POTASSIUM SERPL-SCNC: 3.6 MMOL/L (ref 3.4–5.3)
PROT SERPL-MCNC: 6.3 G/DL (ref 6.8–8.8)
RBC # BLD AUTO: 4.41 10E12/L (ref 3.8–5.2)
SODIUM SERPL-SCNC: 141 MMOL/L (ref 133–144)
WBC # BLD AUTO: 5.6 10E9/L (ref 4–11)

## 2017-10-11 PROCEDURE — 25000132 ZZH RX MED GY IP 250 OP 250 PS 637: Performed by: PHYSICIAN ASSISTANT

## 2017-10-11 PROCEDURE — 25000128 H RX IP 250 OP 636

## 2017-10-11 PROCEDURE — 80053 COMPREHEN METABOLIC PANEL: CPT | Performed by: PHYSICIAN ASSISTANT

## 2017-10-11 PROCEDURE — 25000128 H RX IP 250 OP 636: Performed by: PHYSICIAN ASSISTANT

## 2017-10-11 PROCEDURE — S0169 CALCITROL: HCPCS | Performed by: INTERNAL MEDICINE

## 2017-10-11 PROCEDURE — 83735 ASSAY OF MAGNESIUM: CPT | Performed by: PHYSICIAN ASSISTANT

## 2017-10-11 PROCEDURE — 25000131 ZZH RX MED GY IP 250 OP 636 PS 637

## 2017-10-11 PROCEDURE — 25000132 ZZH RX MED GY IP 250 OP 250 PS 637: Performed by: INTERNAL MEDICINE

## 2017-10-11 PROCEDURE — 85025 COMPLETE CBC W/AUTO DIFF WBC: CPT | Performed by: PHYSICIAN ASSISTANT

## 2017-10-11 PROCEDURE — 25000128 H RX IP 250 OP 636: Performed by: NURSE PRACTITIONER

## 2017-10-11 PROCEDURE — 25000132 ZZH RX MED GY IP 250 OP 250 PS 637

## 2017-10-11 PROCEDURE — 25000125 ZZHC RX 250

## 2017-10-11 PROCEDURE — 36592 COLLECT BLOOD FROM PICC: CPT | Performed by: PHYSICIAN ASSISTANT

## 2017-10-11 PROCEDURE — 84100 ASSAY OF PHOSPHORUS: CPT | Performed by: PHYSICIAN ASSISTANT

## 2017-10-11 RX ORDER — OXYCODONE HYDROCHLORIDE 30 MG/1
30 TABLET ORAL EVERY 4 HOURS PRN
Qty: 60 TABLET | Refills: 0 | Status: ON HOLD | OUTPATIENT
Start: 2017-10-11 | End: 2017-10-26

## 2017-10-11 RX ORDER — CYCLOBENZAPRINE HCL 10 MG
10 TABLET ORAL 3 TIMES DAILY PRN
Qty: 42 TABLET | Refills: 5 | Status: ON HOLD | OUTPATIENT
Start: 2017-10-11 | End: 2017-10-31

## 2017-10-11 RX ORDER — PREDNISONE 20 MG/1
30 TABLET ORAL 2 TIMES DAILY
Qty: 60 TABLET | Refills: 0 | Status: SHIPPED | OUTPATIENT
Start: 2017-10-07 | End: 2017-10-17

## 2017-10-11 RX ORDER — ACYCLOVIR 400 MG/1
400 TABLET ORAL 2 TIMES DAILY
Status: DISCONTINUED | OUTPATIENT
Start: 2017-10-12 | End: 2017-10-11 | Stop reason: HOSPADM

## 2017-10-11 RX ORDER — DIPHENHYDRAMINE HCL 50 MG
50 CAPSULE ORAL
Qty: 56 CAPSULE | Status: ON HOLD | COMMUNITY
Start: 2017-10-11 | End: 2017-12-11

## 2017-10-11 RX ORDER — CETIRIZINE HYDROCHLORIDE 10 MG/1
10 TABLET ORAL 3 TIMES DAILY
Qty: 30 TABLET | Refills: 0 | Status: ON HOLD | OUTPATIENT
Start: 2017-10-11 | End: 2017-10-31

## 2017-10-11 RX ORDER — FLUCONAZOLE 200 MG/1
200 TABLET ORAL DAILY
Qty: 30 TABLET | Refills: 0 | Status: SHIPPED | OUTPATIENT
Start: 2017-10-12 | End: 2017-10-16

## 2017-10-11 RX ORDER — LEVOFLOXACIN 250 MG/1
250 TABLET, FILM COATED ORAL DAILY
Qty: 14 TABLET | Refills: 0 | Status: SHIPPED | OUTPATIENT
Start: 2017-10-12 | End: 2017-10-16

## 2017-10-11 RX ORDER — METHOCARBAMOL 750 MG/1
750 TABLET, FILM COATED ORAL 4 TIMES DAILY PRN
Qty: 360 TABLET | Refills: 1 | Status: ON HOLD | OUTPATIENT
Start: 2017-10-11 | End: 2018-01-22

## 2017-10-11 RX ORDER — DEXAMETHASONE 4 MG/1
8 TABLET ORAL DAILY
Qty: 4 TABLET | Refills: 0 | Status: SHIPPED | OUTPATIENT
Start: 2017-10-12 | End: 2017-10-14

## 2017-10-11 RX ORDER — LEVOFLOXACIN 250 MG/1
250 TABLET, FILM COATED ORAL DAILY
Status: DISCONTINUED | OUTPATIENT
Start: 2017-10-12 | End: 2017-10-11 | Stop reason: HOSPADM

## 2017-10-11 RX ORDER — DEXAMETHASONE 4 MG/1
4 TABLET ORAL 2 TIMES DAILY WITH MEALS
Qty: 6 TABLET | Refills: 0
Start: 2017-10-11 | End: 2017-10-14

## 2017-10-11 RX ORDER — ACYCLOVIR 400 MG/1
400 TABLET ORAL 2 TIMES DAILY
Qty: 30 TABLET | Refills: 0 | Status: SHIPPED | OUTPATIENT
Start: 2017-10-12 | End: 2017-10-16

## 2017-10-11 RX ORDER — FLUCONAZOLE 200 MG/1
200 TABLET ORAL DAILY
Status: DISCONTINUED | OUTPATIENT
Start: 2017-10-12 | End: 2017-10-11 | Stop reason: HOSPADM

## 2017-10-11 RX ORDER — ALLOPURINOL 300 MG/1
300 TABLET ORAL DAILY
Qty: 30 TABLET | Refills: 0 | Status: SHIPPED | OUTPATIENT
Start: 2017-10-11 | End: 2017-10-16

## 2017-10-11 RX ADMIN — OXYCODONE HYDROCHLORIDE 30 MG: 30 TABLET ORAL at 13:16

## 2017-10-11 RX ADMIN — CROMOLYN SODIUM 1 DROP: 40 SOLUTION/ DROPS OPHTHALMIC at 08:42

## 2017-10-11 RX ADMIN — CYCLOPHOSPHAMIDE 1500 MG: 2 INJECTION, POWDER, FOR SOLUTION INTRAVENOUS; ORAL at 13:51

## 2017-10-11 RX ADMIN — RANITIDINE 75 MG: 75 TABLET, FILM COATED ORAL at 14:45

## 2017-10-11 RX ADMIN — MORPHINE SULFATE 30 MG: 30 TABLET, EXTENDED RELEASE ORAL at 14:43

## 2017-10-11 RX ADMIN — LEVOTHYROXINE SODIUM 224 MCG: 112 TABLET ORAL at 08:38

## 2017-10-11 RX ADMIN — TRIAMCINOLONE ACETONIDE: 0.25 OINTMENT TOPICAL at 09:13

## 2017-10-11 RX ADMIN — ENOXAPARIN SODIUM 40 MG: 40 INJECTION SUBCUTANEOUS at 08:40

## 2017-10-11 RX ADMIN — PREDNISONE 60 MG: 50 TABLET ORAL at 08:39

## 2017-10-11 RX ADMIN — METHOCARBAMOL 750 MG: 750 TABLET ORAL at 06:24

## 2017-10-11 RX ADMIN — MONTELUKAST SODIUM 20 MG: 10 TABLET, FILM COATED ORAL at 08:38

## 2017-10-11 RX ADMIN — POTASSIUM CHLORIDE 20 MEQ: 750 TABLET, EXTENDED RELEASE ORAL at 08:39

## 2017-10-11 RX ADMIN — MORPHINE SULFATE 30 MG: 30 TABLET, EXTENDED RELEASE ORAL at 06:24

## 2017-10-11 RX ADMIN — RANITIDINE 75 MG: 75 TABLET, FILM COATED ORAL at 08:39

## 2017-10-11 RX ADMIN — PROCHLORPERAZINE EDISYLATE 10 MG: 5 INJECTION INTRAMUSCULAR; INTRAVENOUS at 06:18

## 2017-10-11 RX ADMIN — CETIRIZINE HYDROCHLORIDE 10 MG: 10 TABLET, FILM COATED ORAL at 14:43

## 2017-10-11 RX ADMIN — SODIUM CHLORIDE, PRESERVATIVE FREE 5 ML: 5 INJECTION INTRAVENOUS at 15:35

## 2017-10-11 RX ADMIN — SODIUM CHLORIDE 150 MG: 9 INJECTION, SOLUTION INTRAVENOUS at 12:41

## 2017-10-11 RX ADMIN — CELECOXIB 200 MG: 200 CAPSULE ORAL at 08:40

## 2017-10-11 RX ADMIN — LIDOCAINE: 50 OINTMENT TOPICAL at 09:13

## 2017-10-11 RX ADMIN — OXYCODONE HYDROCHLORIDE 30 MG: 30 TABLET ORAL at 03:09

## 2017-10-11 RX ADMIN — CALCITRIOL 0.5 MCG: 0.5 CAPSULE, LIQUID FILLED ORAL at 08:39

## 2017-10-11 RX ADMIN — POLYETHYLENE GLYCOL 3350 17 G: 17 POWDER, FOR SOLUTION ORAL at 08:40

## 2017-10-11 RX ADMIN — ALLOPURINOL 300 MG: 300 TABLET ORAL at 08:39

## 2017-10-11 RX ADMIN — CETIRIZINE HYDROCHLORIDE 10 MG: 10 TABLET, FILM COATED ORAL at 08:39

## 2017-10-11 RX ADMIN — DOCUSATE SODIUM 100 MG: 100 CAPSULE, LIQUID FILLED ORAL at 08:38

## 2017-10-11 RX ADMIN — HYDROCHLOROTHIAZIDE 12.5 MG: 12.5 TABLET ORAL at 08:41

## 2017-10-11 RX ADMIN — TACROLIMUS: 1 OINTMENT TOPICAL at 09:13

## 2017-10-11 RX ADMIN — METHOCARBAMOL 750 MG: 750 TABLET ORAL at 12:35

## 2017-10-11 RX ADMIN — SENNOSIDES AND DOCUSATE SODIUM 2 TABLET: 8.6; 5 TABLET ORAL at 08:38

## 2017-10-11 RX ADMIN — ONDANSETRON HYDROCHLORIDE 16 MG: 8 TABLET, FILM COATED ORAL at 08:40

## 2017-10-11 RX ADMIN — OXYCODONE HYDROCHLORIDE 30 MG: 30 TABLET ORAL at 09:12

## 2017-10-11 ASSESSMENT — PAIN DESCRIPTION - DESCRIPTORS
DESCRIPTORS: ACHING
DESCRIPTORS: ACHING

## 2017-10-11 NOTE — DISCHARGE SUMMARY
[facility], [place] -- Discharge Summary -- Hematology / Oncology  Date of Admission:  10/6/2017  Date of Discharge:  10/11/2017  Discharging Provider: Merlene Green  Date of Service (when I saw the patient): 10/11/17    Discharge Diagnoses   Active Problems:    DLBCL (diffuse large B cell lymphoma) (H)    History of Present Illness   **Adopted from H&P Tisha Arias is a 57 year old female with a history of recently diagnosed DLBCL, breast cancer on Tamoxifen s/p  bilateral mastectomies and BENJIE/BSO, h/o papillary thyroid cancer s/p total thyroidectomy, chronic chest wall pain and asthma who presents for cycle 1 DA-REPOCH. She was diagnosed with early-stage breast cancer in 2011 and underwent bilateral mastectomy with reconstruction and has been on Tamoxifen. She did not have chemotherapy or radiation. She has suffered chronically from a pain syndrome involving the chest wall and back and [facility] with [facility]. Benitez.  In August 2017, she presented to ER with dramatic worsening chest pain and back pain. Her workup was negative for cardiac and pulmonary issues.  However, the CT scan performed on 09/01/2017 demonstrated a new destructive lesion in the medial right tenth rib extending to the right T9-T10 neural foramen with vertebral body invasion. There was associated conglomerate of lymphadenopathy around the mid T-spine. On 09/15 she underwent CT-guided biopsy of a T10 paraspinal mass on the right. The pathology report of the T8 vertebral body invading mass showed B-cell high-grade lymphoma. The morphology shows a population of large cells, diffuse lymphoid infiltrate. The cells are atypical with abundant mitotic and apoptotic activity and single cell necrosis. Tumor is CD45 and CD79a positive and focally weakly CD30 positive. The other stains revealed positivity for CD20, BCL6, BCL2 and MUM1, and CD10 and CD21 negative. The CD5 and CD3  highlighted background T-cells.  MYC expression was equivocal with approximately 40% nuclei staining.  Ki-67 proliferative index is greater than 90-95%. The immunohistochemistry is suggestive of non-GCB immunophenotype of diffuse large B-cell lymphoma. The cytogenetics did not show rearrangement of the BCL2 or c-MYC.  There is the presence of BCL6 rearrangement in 78% of cells and gains of MYC and BCL2, but no amplifications. A staging LP and BMBx were negative for lymphoma. She now presents for cycle 1 DA-REPOCH.      On admission, pt is accompanied by her  and friend. She has been having increased pain as well as increased shortness of breath requiring inhaler use this morning. She spoke with Dr. Benitez on the phone yesterday and increased her breakthrough Oxycodone. She has been having nausea for the past couple weeks since her lymphoma diagnosis. She has some difficulty with urination, but no dysuria. No recent fevers or cold like symptoms. Has chronic LE edema. Bowel have been regular, she is typically on the constipation side. Explained chemotherapy regimen. Pt in agreement to start chemotherapy.     Hospital Course  Tisha Arias is a 57 F with high grade lymphoma, PMH breast cancer on Tamoxifen s/p  bilateral mastectomies and BENJIE/BSO, papillary thyroid cancer s/p total thyroidectomy, chronic chest wall pain, & asthma who presents for cycle 1 DA-REPOCH.     In Summary Elvin did well with chemotherapy other than nasreen of 10/10 she has some rigors during CIVI.  She was given demerol, benadryl & decadron & chemotherapy was held for a few hours.  Rigors resolved and chemotherapy was restarted at half the rate per Bebeth.  She didn't have any issues with nausea, which she was very happy about.  Good appetite & activity level.  She did have one exacerbation of her chronic chest wall pain after she was overally active on the AM of 10/10.  She tolerated her LP well in neuro XR, flow was negative for  lymphoma. In good spirits at time of d/c.  Sent home with PPX antibiotics & scheduled neulasta appointment.  Have her set up for SANJANA f/u.    PROBLEM LIST BELOW   # High grade B cell lymphoma. Sohail BLEVINS patient diagnosed August 2017, non-GCB with no evidence of c-MYC or BCL6 rearrangement, but with overexpression of c-MYC by immunohistochemistry and mitotic index over 95%.  Disease is localized above the diaphragm in thoracic and paravertebral soft tissue and mediastinal lymphadenopathy. Staging LP and BMBx were negative for lymphoma. She now presents for cycle 1 DA-REPOCH.  -HOLD PTA Dex 4 mg BID with addition of steroid in treatment regimen.  -Allopurinol 300 mg daily.     Treatment Plan. Cycle 1 DA-REPOCH. Day 1=10/6/17. Today is D5.  -Rituximab 375 mg/m2 D1. Complete.  -Etoposide 50 mg/m2 D1-4.  -Vincristine/Doxorubicin 0.4 mg/m2 D1-4.  -Cyclophosphamide 1500 mg D5.  -Prednisone 60 mg BID D1-5.  -Premed: Emend, Zofran 16 mg daily.  -IT PPx Methotrexate. Scheduled for 10/9 at 1100 in XR. Complete (Flow negative for definitive B cell population, 1 RBC, 0 WBC, glucose 76, protein 47).  -Dexamethasone 8 mg D6 and 7 (sent at d/c then resume home regimen).  -Neulasta & labs (10/12), labs (10/17), labs, SANJANA & possible transfusion (10/19).   -Prophylactic antibiotics at d/c (ACY, FLUC, LEVAQUIN).      # Stage 1, ER/UT-positive, HER2-negative breast cancer. Follows with Dr. Crawley. Diagnosed in 2011. Oncotype recurrence score of 11 (7% recurrence risk after 5 years of tamoxifen). S/p bilateral mastectomies and BENJIE/BSO in 2012. For endocrine therapy she was on Arimidex from 9892-1651, then changed to Tamoxifen b/c of arthralgias on Arimidex. Has been on Tamoxifen since 2014.   -Hold Tamoxifen with chemotherapy. Last dose 10/5.   -F/U with Misbah BLEVINS if 11/27/17.      # Papillary thyroid cancer. Follows with Dr. Castro. Diagnosed in 2013. S/p total thyroidectomy and CND. Received ETOH treatment August 2014, October 2014  and December 2014. Has post-surgical hypothyroidism.  -Continue PTA Levothyroxine.  -Continue PTA calcitriol.      # H/o Rachael en Y gastric bypass. Likely affecting absorption, thus patient is on multiple supplements.  -Continue Ca gluconate, Ca-Vit D, Magnesium.      Chronic Medical Conditions  # Chronic chest wall pain after mastectomy.   -Continue MS Contin 30 mg morning and afternoon and 60 mg at night.  -Oxycodone 30 mg q4hrs prn.  -Celebrex 200 BID.   -Will HOLD Colchicine as interaction with chemo causing increased GI upset and myelosuppression.       # Asthma, allergies. Continue Singulair, Asthmadex, Zyrtec, Triamcinolone cream.     # Anemia:  -continue to monitor.    # Chronic muscle cramps.   -Continue KCl 20 mEq daily, Robaxin 750 mg QID, Valium 5 mg TID, Flexeril prn.     # Volume overload.   # LE edema. Edema is stable on exam. Continue HCTZ 12.5 mg daily.  - closely monitor renal function with daily BMP, Cr stable.     PPx  -GI: Continue Ranitidine 75 mg TID.      Dispo: d/c 10/11/17.    Merlene Green  [facility]  [phone]  Hematology/Oncology  October 11, 2017    Pending Results   These results will be followed up by Sohail & Rashida MCCARTHY.  Unresulted Labs Ordered in the Past 30 Days of this Admission     Date and Time Order Name Status Description    10/10/2017 2026 Blood culture Preliminary     10/4/2017 1115 FISH In process     10/3/2017 0943 CHROMOSOME BONE MARROW In process     9/27/2017 1016 LEUKEMIA LYMPHOMA EVALUATION (FLOW CYTOMETRY) In process     9/14/2017 1520 Fine needle aspiration In process         Code Status  FULL    Primary Care Physician   Shahla Crawley    Physical Exam   Vital Signs with Ranges  Temp:  [97.5  F (36.4  C)-98.5  F (36.9  C)] 98.5  F (36.9  C)  Pulse:  [74] 74  Heart Rate:  [60-75] 71  Resp:  [18] 18  BP: (112-151)/(65-82) 134/82  SpO2:  [94 %-98 %] 96 %  Constitutional: Awake, alert, cooperative, in NAD. Endorses chronic pain 2/2 to too much activity  this AM.  Able to sit up at bedside for exam.  Eyes: PERRL, EOMI, sclera clear, conjunctiva normal.  ENT: Normocephalic, without obvious abnormality, moist mucus membranes, no lesions or thrush.   Respiratory: Non-labored breathing, good air exchange, CTAB, no crackles or wheezing.  Cardiovascular: RRR, no murmur noted.  GI: +BS, soft, non-distended, non-tender, no masses palpated, no hepatosplenomegaly.  Skin: No concerning lesions or rash on exposed areas. Port c/d/i.  Musculoskeletal: 2+ ankle/LE edema.  Neurologic: Awake, A&O x 3. Cranial nerves II-XII are grossly intact.   Neuropsychiatric: Calm, normal affect.     Discharge Disposition  Discharged home & in stable condition.    Discharge Orders     CBC with platelets differential   Last Lab Result: Hemoglobin (g/dL)      Date                     Value                10/11/2017               10.8 (L)         ----------     Basic metabolic panel     Reason for your hospital stay   Elvin was admitted for Cycle 1 DA R EPOCH.  On D4 had rigors during chemotherapy.   Required additional decadron & benadryl & re-initiation of chemotherapy at a slower rate.     Adult [facility] Follow-up and recommended labs and tests   - 1:15 pm for neulasta (10/12)  - labs 10/17  - labs 10/19 (with SANJANA & possible transfusion)      Appointments on [place] and/or [facility] (with [facility] or [facility] provider or service). Call [phone] if you haven't heard regarding these appointments within 7 days of discharge.     Activity   Your activity upon discharge: regular activity as tolerated     When to contact your care team   Please call the [facility] Triage RN Line [phone] (Sirisha RN available M-F 8-5, after 5 pm the RN Advisor will page the On-Call Oncology Fellow who will return your call) for temperature greater than 100.4 F, uncontrolled nausea/vomiting/diarrhea or unrelieved constipation, pain not relieved by medications, bleeding not stopped by pressure, dizziness,  chest pain, shortness of breath, changes in level of consciousness, or any other new concerning symptoms.     Full Code     Diet   Follow this diet upon discharge: regular as tolerated       Discharge Medications   Current Discharge Medication List      START taking these medications    Details   predniSONE (DELTASONE) 20 MG tablet Take 3 tablets (60 mg) by mouth 2 times daily for 10 days  Qty: 60 tablet, Refills: 0    Associated Diagnoses: High grade B-cell lymphoma (H)      allopurinol (ZYLOPRIM) 300 MG tablet Take 1 tablet (300 mg) by mouth daily  Qty: 30 tablet, Refills: 0    Associated Diagnoses: High grade B-cell lymphoma (H)      fluconazole (DIFLUCAN) 200 MG tablet Take 1 tablet (200 mg) by mouth daily  Qty: 30 tablet, Refills: 0    Associated Diagnoses: High grade B-cell lymphoma (H)      acyclovir (ZOVIRAX) 400 MG tablet Take 1 tablet (400 mg) by mouth 2 times daily  Qty: 30 tablet, Refills: 0    Associated Diagnoses: High grade B-cell lymphoma (H)      levofloxacin (LEVAQUIN) 250 MG tablet Take 1 tablet (250 mg) by mouth daily  Qty: 14 tablet, Refills: 0    Associated Diagnoses: High grade B-cell lymphoma (H)         CONTINUE these medications which have CHANGED    Details   oxyCODONE (ROXICODONE) 30 MG IR tablet Take 1 tablet (30 mg) by mouth every 4 hours as needed for moderate to severe pain  Qty: 60 tablet, Refills: 0    Associated Diagnoses: High grade B-cell lymphoma (H)      cetirizine (ZYRTEC ALLERGY) 10 MG tablet Take 1 tablet (10 mg) by mouth 3 times daily On hold for lab test.  Qty: 30 tablet, Refills: 0    Associated Diagnoses: High grade B-cell lymphoma (H)      diphenhydrAMINE (BENADRYL) 50 MG capsule Take 1 capsule (50 mg) by mouth nightly as needed for itching or sleep  Qty: 56 capsule    Associated Diagnoses: High grade B-cell lymphoma (H)      !! dexamethasone (DECADRON) 4 MG tablet Take 1 tablet (4 mg) by mouth 2 times daily (with meals) for 3 days Or as directed by MD  Qty: 6 tablet,  Refills: 0    Associated Diagnoses: High grade B-cell lymphoma (H)      !! dexamethasone (DECADRON) 4 MG tablet Take 2 tablets (8 mg) by mouth daily for 2 days  Qty: 4 tablet, Refills: 0    Associated Diagnoses: High grade B-cell lymphoma (H)      cyclobenzaprine (FLEXERIL) 10 MG tablet Take 1 tablet (10 mg) by mouth 3 times daily as needed On hold Dr Monsalve  Qty: 42 tablet, Refills: 5    Associated Diagnoses: High grade B-cell lymphoma (H)      methocarbamol (ROBAXIN) 750 MG tablet Take 1 tablet (750 mg) by mouth 4 times daily as needed . Ok to take a 5th Robaxin on very severe days.  Qty: 360 tablet, Refills: 1    Associated Diagnoses: Myofascial pain       !! - Potential duplicate medications found. Please discuss with provider.      CONTINUE these medications which have NOT CHANGED    Details   ondansetron (ZOFRAN-ODT) 8 MG ODT tab Take 1 tablet (8 mg) by mouth every 8 hours as needed for nausea or vomiting  Qty: 90 tablet, Refills: 3    Comments: PA Case #[id number], Approved 9/4/2017-4/2/2018  Associated Diagnoses: Nausea with vomiting      COMPOUND CONTAINING CONTROLLED SUBSTANCE (CMPD RX) - PHARMACY TO MIX COMPOUNDED MEDICATION Apply small amount to affected area two times daily.  Qty: 120 g, Refills: 6    Comments: Ketamine 6% + lidocaine 10% in Lipo.  Associated Diagnoses: Neoplasm related pain      morphine (MS CONTIN) 30 MG 12 hr tablet Take 1 tablet (30 mg) by mouth every 8 hours . Take an addition pill at night such that your evening dose is 60mg  Qty: 120 tablet, Refills: 0    Associated Diagnoses: Neoplasm related pain      lidocaine-prilocaine (EMLA) cream Apply topically as needed (port access pain.)  Qty: 30 g, Refills: 1    Associated Diagnoses: Neoplasm related pain; Chest wall pain      diazepam (VALIUM) 5 MG tablet Take 1 tablet (5 mg) by mouth 3 times daily as needed For muscle spasms  Qty: 90 tablet, Refills: 2    Associated Diagnoses: Chest wall pain      levothyroxine (SYNTHROID/LEVOTHROID)  112 MCG tablet Mon-Sat: (2 tabs daily) Sunday: 3 tabs  Qty: 189 tablet, Refills: 3    Associated Diagnoses: Postsurgical hypothyroidism; Papillary carcinoma, follicular variant (H); Postsurgical hypoparathyroidism (H); Thyroid cancer (H)      hydrochlorothiazide (MICROZIDE) 12.5 MG capsule Take 1 capsule (12.5 mg) by mouth daily  Qty: 90 capsule, Refills: 2    Associated Diagnoses: Hypocalcemia      SUMAtriptan (IMITREX) 50 MG tablet Take 1 tablet (50 mg) by mouth at onset of headache for migraine (may repeat in 2 hours as needed, max 2 tablets daily)  Qty: 5 tablet, Refills: 3    Associated Diagnoses: Migraine without status migrainosus, not intractable, unspecified migraine type      celecoxib (CELEBREX) 200 MG capsule Take 1 capsule (200 mg) by mouth 2 times daily  Qty: 180 capsule, Refills: 0    Associated Diagnoses: Chest wall pain      montelukast (SINGULAIR) 10 MG tablet TAKE TWO TABLETS BY MOUTH TWICE DAILY  Qty: 120 tablet, Refills: 11    Associated Diagnoses: Idiopathic mast cell activation syndrome (H)      chlorhexidine (PERIDEX) 0.12 % solution Refills: 0      EPINEPHrine (EPIPEN 2-CARIDAD) 0.3 MG/0.3ML injection Inject 0.3 mLs (0.3 mg) into the muscle once as needed for anaphylaxis  Qty: 0.6 mL, Refills: 3      potassium chloride SA (POTASSIUM CHLORIDE) 20 MEQ CR tablet Take 1 tablet (20 mEq) by mouth daily  Qty: 90 tablet, Refills: 3    Associated Diagnoses: Hypokalemia      VENTOLIN  (90 BASE) MCG/ACT Inhaler INHALE 2 PUFFS INTO THE LUNGS EVERY 4 HOURS AS NEEDED FOR SHORTNESS OF BREATH OR DIFFICULT BREATHING OR WHEEZING  Qty: 18 g, Refills: 3    Associated Diagnoses: Mild intermittent asthma without complication      cromolyn (OPTICROM) 4 % ophthalmic solution Place 1 drop into both eyes 4 times daily  Qty: 10 mL, Refills: 5    Associated Diagnoses: Idiopathic mast cell activation syndrome (H)      NASALCROM 5.2 MG/ACT AERS Inhaler SPRAY ONE SPRAY( 1 ML) IN NOSTRIL DAILY  Qty: 1 Bottle, Refills: 6     Associated Diagnoses: Mast cell disease, systemic      Cholecalciferol (VITAMIN D3) 2000 UNITS CAPS Take 10,000 Units by mouth daily  Qty: 60 capsule, Refills: 4    Associated Diagnoses: Thyroid cancer (H); Postsurgical hypothyroidism; Papillary carcinoma, follicular variant (H); Postsurgical hypoparathyroidism (H)      lidocaine (XYLOCAINE) 5 % ointment SANJANA TOPICALLY QID PRN  Qty: 35.44 g, Refills: 3    Associated Diagnoses: Chest wall pain      !! order for DME Equipment being ordered: compression stockings. 20-30 mm or 30 - 40 mm as patient can tolerate. Physical therapy to determine if they should be above or below the knee.  Qty: 2 Units, Refills: 4    Associated Diagnoses: Venous stasis      !! order for DME Equipment being ordered: compression bra  Qty: 2 Device, Refills: 1    Associated Diagnoses: Malignant neoplasm of right female breast, unspecified site of breast      ranitidine (ZANTAC) 75 MG tablet Take 1 tablet (75 mg) by mouth 3 times daily  Qty: 270 tablet, Refills: 3    Associated Diagnoses: Mast cell disease, systemic      tamoxifen (NOLVADEX) 20 MG tablet Take 1 tablet (20 mg) by mouth daily  Qty: 90 tablet, Refills: 3    Associated Diagnoses: Malignant neoplasm of female breast, unspecified laterality, unspecified site of breast      Lidocaine HCl Monohydrate POWD       calcitRIOL (ROCALTROL) 0.25 MCG capsule 2 tabs in am and 1 tab qhs  Qty: 270 capsule, Refills: 3    Associated Diagnoses: Postsurgical hypothyroidism; Papillary carcinoma, follicular variant (H); Metastasis to cervical lymph node (H)      aluminum chloride (DRYSOL) 20 % external solution Apply topically At Bedtime  Qty: 60 mL, Refills: 3    Associated Diagnoses: Rash; Intertrigo      tacrolimus (PROTOPIC) 0.1 % ointment Apply topically 2 times daily  Qty: 60 g, Refills: 3    Associated Diagnoses: Rash; Intertrigo      triamcinolone (KENALOG) 0.025 % ointment Apply topically 2 times daily Mix with 1 lb jar of vaseline or  aquaphor  Qty: 80 g, Refills: 3    Associated Diagnoses: Intertrigo; Rash      !! UNABLE TO FIND MEDICATION NAME: Tumeric 3 capsules daily      diclofenac (VOLTAREN) 1 % GEL Apply affected area two times daily PRN using enclosed dosing card.  Qty: 100 g, Refills: 1    Associated Diagnoses: Myofascial pain      mometasone (ASMANEX 30 METERED DOSES) 110 MCG/INH inhaler Inhale 1 puff into the lungs daily  Qty: 3 Inhaler, Refills: 3    Associated Diagnoses: Intermittent asthma, uncomplicated      !! UNABLE TO FIND 2 tablets 3 times daily MEDICATION NAME: Natural D-Hist    Associated Diagnoses: Breast cancer, unspecified laterality; Papillary carcinoma, follicular variant (H); Chronic musculoskeletal pain      MAGNESIUM CITRATE PO Take 400 mg by mouth daily      calcium citrate-vitamin D (CALCIUM CITRATE +D) 315-250 MG-UNIT TABS Take 2 tablets by mouth 3 times daily  Qty: 120 tablet      calcium carbonate (TUMS) 500 MG chewable tablet Take 2 tablets (1,000 mg) by mouth nightly as needed for other (cramps)  Qty: 180 chew tab, Refills: 3      !! UNABLE TO FIND 1 tablet 3 times daily MEDICATION NAME: calcium D-Glucarate      Digestive Enzymes (BETAINE HCL PO) Take 2 tablets by mouth as needed Betaine HCL and Pepsin: Take 1-7 tabs by mouth daily, if burning take one tablet less once daily.  Betaine HCL 1.04 g  Pepsin 40 mg      Triamcinolone Acetonide (AZMACORT IN) Inhale 2 puffs into the lungs as needed      !! UNABLE TO FIND 1 tablet 3 times daily MEDICATION NAME: Digestzymes    Associated Diagnoses: Thyroid cancer (H); Postsurgical hypothyroidism; Postsurgical hypoparathyroidism (H)      !! UNABLE TO FIND 1 tablet daily MEDICATION NAME: Pure Encapsulations    Associated Diagnoses: Thyroid cancer (H); Postsurgical hypothyroidism; Postsurgical hypoparathyroidism (H)      Omega-3 Fatty Acids (OMEGA 3 PO) Take 5 mLs by mouth      ACAI PO Take 1,000 mg by mouth 2 times daily    Associated Diagnoses: Breast cancer, unspecified  laterality; Thyroid cancer (H); Chronic arthralgias of knees and hips, unspecified laterality      Probiotic Product (PROBIOTIC DAILY PO) Take 1 capsule by mouth daily Lacto acid bifidobacterium    Associated Diagnoses: Breast cancer, unspecified laterality; Thyroid cancer (H); Chronic arthralgias of knees and hips, unspecified laterality      polyethylene glycol (MIRALAX) powder Take 17 g by mouth daily  Qty: 510 g, Refills: 1    Associated Diagnoses: Acute constipation      docusate sodium 100 MG tablet Take 100 mg by mouth daily  Qty: 60 tablet, Refills: 1    Associated Diagnoses: Acute constipation       !! - Potential duplicate medications found. Please discuss with provider.      STOP taking these medications       Colchicine 0.6 MG CAPS Comments:   Reason for Stopping:             Allergies   Allergies   Allergen Reactions     Baclofen Other (See Comments) and Unknown     Other reaction(s): Edema, chest pain, seizures.      No Clinical Screening - See Comments Shortness Of Breath, Palpitations, Anaphylaxis, Itching, Swelling, Difficulty breathing and Rash     Sukhjinder wipes- oral allergy -  July 2015: throat tightness from a Chinese herbal medicine Wilmer Barry Tran     Serotonin Anxiety, Other (See Comments) and Swelling     Seizures      Amitriptyline Hcl Swelling     Birch Trees      Potatoes, carrots, cherries, celery, apple, pears, plums, peaches, parsnip, kiwi, hazelnuts, and apricots,      Blue Dyes Itching     Headaches       Cymbalta Other (See Comments)     Flushing, tremor/muscle twitching and edema     Gabapentin Other (See Comments)     edema  Systemic edema, weaned off from Feb to March per Dr. Dowd.    edema     Grass      Mugwort [Artemisia Vulgaris]      Various spices     Ragweeds      Melons, bananas, cucumbers, zucchini.     Topamax      Nortriptyline Itching, Visual Disturbance, Swelling, GI Disturbance, Anxiety, Other (See Comments) and Nausea     Other reaction(s): Swelling     Data    Most Recent 3 CBC's:  Recent Labs   Lab Test  10/11/17   0509  10/10/17   0604  10/09/17   0536   WBC  5.6  6.0  7.3   HGB  10.8*  9.8*  9.6*   MCV  80  82  82   PLT  230  186  191      Most Recent 3 BMP's:  Recent Labs   Lab Test  10/11/17   0509  10/10/17   0604  10/09/17   0536   NA  141  142  143   POTASSIUM  3.6  3.6  3.7   CHLORIDE  103  104  106   CO2  27  30  27   BUN  17  20  17   CR  0.72  0.73  0.70   ANIONGAP  10  9  9   ELMO  8.2*  8.1*  8.0*   GLC  234*  120*  156*     Most Recent 2 LFT's:  Recent Labs   Lab Test  10/11/17   0509  10/10/17   0604   AST  12  14   ALT  29  25   ALKPHOS  57  51   BILITOTAL  0.5  0.6     Most Recent INR's and Anticoagulation Dosing History:  Anticoagulation Dose History     Recent Dosing and Labs Latest Ref Rng & Units 10/24/2014 12/30/2014 4/4/2016 9/1/2017 9/14/2017 9/27/2017 10/2/2017    INR 0.86 - 1.14 - - 0.94 1.00 1.02 1.02 1.14    INR Point of Care 0.86 - 1.14 1.0 1.0 - - - - -        Most Recent 3 Troponin's:  Recent Labs   Lab Test  10/04/17   1245  09/01/17   1623  05/28/13   1441  05/28/13   1439   TROPI  <0.015  <0.015  <0.012   --    TROPONIN   --    --    --   0.13*     Most Recent Cholesterol Panel:  Recent Labs   Lab Test  05/21/14   1040   CHOL  168   LDL  81   HDL  62   TRIG  124     Most Recent 6 Bacteria Isolates From Any Culture (See EPIC Reports for Culture Details):  Recent Labs   Lab Test  10/10/17   2247  07/12/16   0712  05/06/13   1038  03/01/12   1303   CULT  No growth after 6 hours  Culture negative after 4 weeks  Moderate growth Normal skin jacky  <10,000 colonies/mL Gram negative rods No further identification 50 to 100,000 colonies/mL Mixed gram positive jacky No Beta hemolytic Streptococcus  No anaerobes isolated  Light growth Coagulase negative Staphylococcus     Most Recent TSH, T4 and A1c Labs:  Recent Labs   Lab Test  07/12/17   0759   01/21/13   0936   TSH  0.02*   < >   --    T4  1.54*   < >   --    A1C   --    --   5.5    < > =  values in this interval not displayed.

## 2017-10-11 NOTE — PLAN OF CARE
Problem: Chemotherapy Effects (Adult)  Goal: Signs and Symptoms of Listed Potential Problems Will be Absent, Minimized or Managed (Chemotherapy Effects)  Signs and symptoms of listed potential problems will be absent, minimized or managed by discharge/transition of care (reference Chemotherapy Effects (Adult) CPG).   Outcome: No Change  Etoposide @ 23.1mL/hr; Vincristine/Dox @ 44.2mL/hr. No sign of reaction noted. Good blood return to port. Compazine x 1 for nausea. Oxy x 1 for pain. Adequate UOP. NS @ 50mL/hr. Independent. Will continue w/POC.

## 2017-10-11 NOTE — PLAN OF CARE
Problem: Patient Care Overview  Goal: Plan of Care/Patient Progress Review  Outcome: No Change     VSS. Oxy given for back pain x2. Dose 4 civi vincristine/doxorubicin/etoposide infusing. Had been tolerating well until pt started having chills/rigors this evening. MD notified and believes it may have been a delayed reaction to chemo given pt's allergy history. Demerol, dexamethasone, and benadryl given. Reports chest pressure; EKG normal. Blood culture also ordered. Nursing care order to restart chemo at half rate when symptoms resolve and then slowly titrate up (see MAR for latest rate). Pt frustrated that discharge will be delayed but reports feeling better tonight. Last BM was 10/9 but feeling constipated; miralax given. Discharge tomorrow when chemo finished infusing. Continue with plan of care.

## 2017-10-11 NOTE — PROGRESS NOTES
Discharge    D: Orders for discharge and outpatient medications written.    I: Home medications and return to clinic schedule reviewed with patient. Discharge instructions and parameters for calling Health Care Provider reviewed. Patient left at 1545 accompanied by . No longer showing signs of chemo reaction.     A: Patient/family verbalized understanding and was ready for discharge. Port heparin locked and de-accessed. Discussed dexamethasone tapor. Discharge instructions fully discussed with previous nurse.     P: Patient instructed to  oxycodone in Pharmacy. Will be picking up other medications at home pharmacy. Follow up as scheduled for 10/12 for injection. Follow up 10/17 for labs. Follow up 10/19 for labs, infusion, and appointment with Rashida ANSARI. Will have US of head and neck on 11/2. Discussed with care coordinator that appointment times may change and that she should check on her MyChart.

## 2017-10-11 NOTE — PLAN OF CARE
Problem: Patient Care Overview  Goal: Plan of Care/Patient Progress Review  Outcome: No Change  Pt received cytoxan infusion without problem. Good blood returned via port a cath.good po intake. Afeb.vss. Dc paper given to pt.

## 2017-10-11 NOTE — PROGRESS NOTES
Care Coordinator- Discharge Planning     Admission Date/Time:  10/6/2017  Attending MD:  Preeti Guzman MD  Hematologist: Dr. Sauceda     Data  Date of initial CC assessment:  10/11/2017  Chart reviewed, discussed with interdisciplinary team.   Patient was admitted for:   1. High grade B-cell lymphoma (H)         Assessment  Concerns with insurance coverage for discharge needs: None.   Current Living Situation: Patient lives with family.  Support System: Supportive  Services Involved: Outpatient Infusion Services  Transportation: Family or Friend will provide  Barriers to Discharge: None    Coordination of Care and Referrals: Provided patient/family with options for Outpatient Infusion Services.    Per Dr. Lama, patient stable to discharge today with clinic follow up; follow up scheduled with [facility]. 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 She expressed concern that she may lose her job if she continues to need time off for appointments. Writer encouraged patient to call [facility] directly to adjust appointments as needed. Patient shared that she has only been in her current position for 6 months and does not qualify for medical leave.      Plan  Anticipated Discharge Date:  10/11/2017  Anticipated Discharge Plan:  Home with clinic follow up    CTS Handoff completed:  YES (DANIEL Sesay)    DANIEL Quinteros  Phone [phone]  Pager [phone]

## 2017-10-12 ENCOUNTER — CARE COORDINATION (OUTPATIENT)
Dept: ONCOLOGY | Facility: CLINIC | Age: 57
End: 2017-10-12

## 2017-10-12 ENCOUNTER — ALLIED HEALTH/NURSE VISIT (OUTPATIENT)
Dept: ONCOLOGY | Facility: CLINIC | Age: 57
End: 2017-10-12
Attending: INTERNAL MEDICINE
Payer: COMMERCIAL

## 2017-10-12 ENCOUNTER — CARE COORDINATION (OUTPATIENT)
Dept: CARE COORDINATION | Facility: CLINIC | Age: 57
End: 2017-10-12

## 2017-10-12 ENCOUNTER — TELEPHONE (OUTPATIENT)
Dept: FAMILY MEDICINE | Facility: CLINIC | Age: 57
End: 2017-10-12

## 2017-10-12 DIAGNOSIS — C85.10 HIGH GRADE B-CELL LYMPHOMA (H): Primary | ICD-10-CM

## 2017-10-12 PROCEDURE — 25000128 H RX IP 250 OP 636: Mod: ZF

## 2017-10-12 PROCEDURE — 96372 THER/PROPH/DIAG INJ SC/IM: CPT

## 2017-10-12 RX ADMIN — PEGFILGRASTIM 6 MG: 6 INJECTION SUBCUTANEOUS at 16:28

## 2017-10-12 NOTE — MR AVS SNAPSHOT
After Visit Summary   10/12/2017    Tisha Arias    MRN: [id number]           Patient Information     Date Of Birth          1960        Visit Information        Provider Department      10/12/2017 4:30 PM Nurse,  Oncology [facility]        Today's Diagnoses     High grade B-cell lymphoma (H)    -  1      Care Instructions      Oncology: Controlling Nausea and Vomiting     Taken before meals, medicines can help ease nausea.    Nausea and vomiting are common side effects of chemotherapy and radiation therapy. Side effects happen when treatment changes some normal cells as well as cancer cells. In this case, the cells lining your stomach and the part of your brain that controls vomiting are affected.  Nausea is feeling that you need to throw up. Vomiting is when you actually do throw up. This is when your body forces food that is in your stomach out through your mouth.  Nausea and vomiting are common. They can be caused by many things. These include:    Stomach flu (gastroenteritis)    Food poisoning    Stomach pain (gastritis)    Blockages in the digestive system    Constipation    Infection    Anxiety and stress  They can also be caused by a head injury, an infection in the brain or inside the ear, or migraines. Other common causes of nausea and vomiting include:    Brain tumor    Brain bruise or injury    Motion sickness    Alcohol, pain medicines such as morphine, and cancer medicines (chemotherapy)    Certain medical treatments, such as radiation therapy    Poisonous things (toxins) such as plants or liquids that are swallowed by accident    Advanced types of cancer    Movement problems (psychogenic problems)  Extra pressure in the fluid that surrounds the brain and spinal cord (elevated intracranial pressure)  Sometimes belly (abdominal) pain and cramps are experienced along with nausea and vomiting. The symptoms can be mild and go away by themselves.  Other symptoms can be serious and must be treated.  When to seek medical advice  Nausea and vomiting can happen before, during, or after cancer treatments. But it can be controlled. Don t consider it a normal part of cancer and cancer treatment. If not managed, it can become serious. It can change the fluid and chemical balances in your body, and could even keep you from getting cancer treatment. Call your healthcare provider right away if any of the following occur:    You have nausea or vomiting that lasts 24 hours or more    You can t take your antiemetics, or they are not working    You have trouble keeping fluids down    You become dizzy, lightheaded, or confused    You have very dark urine or you stop urinating  Talk to your healthcare provider about your treatment and the nausea and vomiting management plan that's best for you. Be sure you know how and when to use antiemetics and when to call your provider.   Medicines can help  Nausea or vomiting can often be prevented or controlled with medicines called antiemetics. Your provider may give you antiemetics before or after treatment if you are getting chemotherapy or other treatments that cause nausea or vomiting. You may have to try different medicines or different combinations of medicines to get relief. But in nearly all cases, nausea and vomiting can be relieved.  Eating tips    If you have medicines to control nausea, take them before meals as directed.    Avoid fatty or greasy foods while nauseated.    Eat small meals slowly throughout the day.    Ask someone to sit with you while you eat to keep you from thinking about feeling nauseated.    Eat foods at room temperature or colder to avoid strong smells.    Eat dry foods, such as toast, crackers, or pretzels. Also eat cool, light foods, such as applesauce, and bland foods, such as oatmeal or skinned chicken.    Try to keep taking in clear fluids in small sips, or as ice chips, gelatin, or ice  pops.  Other ways to feel better    Get a little fresh air. Take a short walk.    Talk to a friend, listen to music, or watch TV.    Take a few deep, slow breaths.    Eat by candlelight or in surroundings that you find relaxing.    Use a method to help you relax, such as guided imagery. Imagine yourself in a beautiful, restful scene. Or daydream about the place you d most like to be.  Date Last Reviewed: 12/1/2016 2000-2017 [employer]. [address] [place] [address]. All rights reserved. This information is not intended as a substitute for professional medical care. Always follow your healthcare professional's instructions.        Neutropenia  White blood cells (WBCs) help protect the body from infection. Neutrophils are a type of white blood cell. Their main job is to help the body fight bacterial and fungal infections. Neutropenia occurs when there are fewer neutrophils in the blood than normal. It can range from mild to severe. This depends on the number of neutrophils in the blood. Severe neutropenia puts a person at higher risk for having more infections. Bacterial and fungal infections are most common. Your doctor can tell you more about your condition and whether it needs to be treated.    What causes neutropenia?  There are 2 main types of neutropenia: congenital and acquired. Each type has many causes:    Congenital neutropenia. These are the types that are present at birth. They are caused by certain rare genetic conditions, such as Kostmann s syndrome. Most often the neurtropenia is mild and normal for certain ethnic groups.    Acquired neutropenia. This type is not present at birth. Causes include:    Certain medicines, such as antibiotics and chemotherapy drugs    Certain autoimmune conditions    Certain viral, bacterial, or parasitic infections    Too little folate or vitamin B12 in the diet    Underlying bone marrow problem, such as leukemia or myelodysplastic syndrome  (MDS)    Other causes  How is neutropenia diagnosed?  Your healthcare provider may check for neutropenia if you have frequent infections. Your provider may also check for neutropenia if you re having certain treatments, such as chemotherapy, which is known to cause a lower neutrophil count. Tests will be done to confirm the problem. These may include:    A complete blood cell count (CBC). This test measures the amounts of the different types of cells in your blood. This includes the WBCs. The WBC count can be broken down further to find the number of neutrophils and immature neutrophils (bands) in your blood. This is called an absolute neutrophil count (ANC).    A blood smear. This test checks for the different types of blood cells in your blood and how they appear. A sample of your blood is spread on a glass slide and viewed under a microscope. A stain is used so the blood cells can be seen.    A bone marrow aspiration and biopsy. This test checks for problems with how your bone marrow makes blood cells. A needle is used to remove a sample of the bone marrow in your hipbone. The sample is then sent to a lab to be tested for problems.  How is neutropenia treated?    If there is a clear cause of neutropenia, it is addressed. For instance, if a medicine is the cause, it may be stopped or changed.    For mild cases, often no treatment is needed.    For moderate to severe cases, treatment is likely needed. This may include:    G-CSF (granulocyte-colony stimulating factor). This is a special type of protein. It helps promote the growth and activity of neutrophils. G-CSF is given by injection.    Bone marrow transplant. This treatment replaces diseased bone marrow cells with healthy cells from a matched donor. This treatment is only done in specific severe cases.  What is the long-term outcome of neutropenia?  The outcome of neutropenia varies for each person. For some people, neutropenia may resolve after a few weeks or  months. For other people, it may be long-lasting. In these cases, ongoing care and treatment is needed. Your healthcare provider will talk to you more about what to expect from your condition.  When to call your healthcare provider  Call your healthcare provider right away if you have any of the following:    Fever of 100.4 F (38 C) or higher (call 911 or 24-hour urgent care -- this is especially important if you have severe neutropenia, which puts you at higher risk for life-threatening infection)    Cold sweat or chills    Chest pain or trouble breathing    Sore throat    Extreme tiredness or fatigue    Nausea and vomiting    Redness, warmth, or drainage from any open cuts or wounds    Pain or burning with urination; frequent urination    Pain, burning, or bleeding in the rectum    Severe constipation or diarrhea    Bloody stool or urine    How can I prevent infections?  With neutropenia, you need to take extra care to protect yourself from infection. Be sure to:    Wash your hands often, especially before eating and after using the bathroom. Use warm water and soap. Or use a hand gel that contains at least 60% alcohol.    Avoid close contact with others who may be ill.    Clean items you use often with disinfectant wipes. This includes phones and computer keyboards.    Avoid touching your eyes, nose, and mouth, especially if your hands are not clean.    Practice good oral hygiene. Use a soft toothbrush. Also, brush and floss your teeth gently.    Always wipe from front to back after a bowel movement.    Keep cuts and scrapes clean and covered until they heal.    Avoid sharing items such as towels, toothbrushes, razors, clothing, and sports equipment.    Store and handle foods safely to prevent food-borne illness.    Ask your healthcare provider if you need to take antibiotics before and after having any dental or medical procedures.    Ask your healthcare provider if you need to wear a special mask near  construction sites or farm areas.  Date Last Reviewed: 12/1/2016 2000-2017 [employer]. [address] [place] [address], [place] [address]. All rights reserved. This information is not intended as a substitute for professional medical care. Always follow your healthcare professional's instructions.        Nutrition During Chemotherapy     Drink plenty of liquids, such as water.     During chemotherapy, the energy provided by a healthy diet can help you rebuild normal cells. It can also help you keep up your strength and fight infection. As a result, you may feel better and be more able to cope with side effects. Ask your doctor about your nutrition needs.  Drink plenty of fluids    Fluids help the body produce urine and decrease constipation. They help prevent kidney and bladder problems. They also help replace fluids lost from vomiting and diarrhea.    Try water, unsweetened juices, and other flavored drinks without caffeine. They flush toxins from the body.  Get enough calories    Calories are fuel. The body uses this fuel to perform all of its functions, including healing.    It s OK to be lean, but be sure you are not underweight. If you are, try eating more calories.    Eat calorie-dense foods such as avocados, peanut butter, eggs, and ice cream.    If you need extra calories, add butter, gravy, and sauces to foods (if tolerated).    If you don't need the extra calories, try to limit foods that are fried, greasy, or high in fat or added sugar.  Eat protein, fruits, and vegetables    Protein builds muscle, bone, skin, and blood. It helps your body heal and fight infection. It also helps boost your energy level.    Good protein choices include yogurt, eggs, chicken, lean meats, beans, and peanut butter.    Fruits and vegetables are full of important vitamins, minerals, and fiber to help your body function properly.    Try to eat a variety of vegetables, fruits, whole grains, and beans.    Ask your doctor  about instant protein powder or other supplements.  Eating right during treatment  Side effects may make it a little harder to eat well on some days. The following tips will help you continue to get the nutrition you need:    Be open to new foods and recipes.    Eat small portions often and slowly.    Have a healthy snack instead of a meal if you are not very hungry.    Try eating in a new setting.    Physical activity, such as walking, can help increase your appetite. Try to be active for at least 30 minutes each day.    Boost your diet by getting the vitamins and minerals you need from fruits, vegetables, and whole grains.    If you live alone and are not up to cooking, ask your healthcare provider about Meals on Wheels or other outreach programs.    Sometimes, it is best to follow your appetitie. Eat when you are hungry, but when you ar enot, forcing yourself to eat can make you feel bad, nauseated, or even cause you to vomit.   Date Last Reviewed: 1/6/2016 2000-2017 [employer]. [address] [place] [address]. All rights reserved. This information is not intended as a substitute for professional medical care. Always follow your healthcare professional's instructions.                Follow-ups after your visit        Your next 10 appointments already scheduled     Oct 16, 2017  4:45 PM CDT   [facility] Lab Draw with  S[facility] LAB DRAW   [facility] ([facility])    [address] [place] [id number] [phone]            Oct 16, 2017  5:20 PM CDT   (Arrive by 5:05 PM)   Return Visit with COTY Jessica   [facility] ([facility])    [address] [place] [id number] [phone]            Oct 19, 2017 11:30 AM CDT   M[facility]ic Lab Draw with  Sazze LAB DRAW   [facility] ([facility])    [address] [place] [address]    [address] [facility] [address]   [phone]            Oct 19, 2017 12:00 PM CDT   Infusion 180 with  ONCOLOGY INFUSION, UC 16 ATC   [facility] ([facility])    [address] [place] [address]   [phone]            Nov 02, 2017  5:00 PM CDT   US HEAD NECK SOFT TISSUE with UCUS3   [facility] US ([facility])    [address] [place] [id number] [phone]           Please bring a list of your medicines (including vitamins, minerals and over-the-counter drugs). Also, tell your doctor about any allergies you may have. Wear comfortable clothes and leave your valuables at home.  You do not need to do anything special to prepare for your exam.  Please call the Imaging Department at your exam site with any questions.            Nov 02, 2017  6:00 PM CDT   LAB with  LAB   [facility] ([facility])    [address] [place] [id number] [phone]           Patient must bring picture ID. Patient should be prepared to give a urine specimen  Please do not eat 10-12 hours before your appointment if you are coming in fasting for labs on lipids, cholesterol, or glucose (sugar). Pregnant women should follow their Care Team instructions. Water with medications is okay. Do not drink coffee or other fluids. If you have concerns about taking  your medications, please ask at office or if scheduling via [facility], send a message by clicking on Secure Messaging, Message Your Care Team.            Nov 13, 2017  4:30 PM CST   (Arrive by 4:15 PM)   RETURN ENDOCRINE with Avelina Castro MD   [facility] ([facility])    [address] [place] [address]  [address] [facility] [id number] [phone]            Nov 27, 2017  4:00 PM CST   (Arrive by 3:45 PM)   Return Visit with Shahla Crawley MD     [facility] ([facility])    [address]  [address] Floor  [facility] [address] [phone]              Who to contact     If you have questions or need follow up information about today's clinic visit or your schedule please contact [facility] directly at [phone].  Normal or non-critical lab and imaging results will be communicated to you by MyChart, letter or phone within 4 business days after the clinic has received the results. If you do not hear from us within 7 days, please contact the clinic through ZEFRhart or phone. If you have a critical or abnormal lab result, we will notify you by phone as soon as possible.  Submit refill requests through [employer] or call your pharmacy and they will forward the refill request to us. Please allow 3 business days for your refill to be completed.          Additional Information About Your Visit        [id number] Information     [employer] gives you secure access to your electronic health record. If you see a primary care provider, you can also send messages to your care team and make appointments. If you have questions, please call your primary care clinic.  If you do not have a primary care provider, please call [phone] and they will assist you.        Care EveryWhere ID     This is your Care EveryWhere ID. This could be used by other organizations to access your [facility] medical records  [id number]         Blood Pressure from Last 3 Encounters:   10/11/17 134/82   10/04/17 120/70   10/03/17 125/72    Weight from Last 3 Encounters:   10/11/17 87.9 kg (193 lb 12.6 oz)   10/04/17 87.7 kg (193 lb 6.4 oz)   10/03/17 87.7 kg (193 lb 4.8 oz)              Today, you had the following     No orders found for display       Primary Care Provider Office Phone # Fax #    Shahla Crawley [phone]       [address] [place] [address]  [facility] [address]        Equal Access to Services      [facility]ODRIGO [facility] : Hadii aad ku daria Venegas, waaxda luqadaha, qaybta kaalmada adechloé, ranjan sulma pamumm lua fishchance yoder . So [facility] [phone].    ATENCIÓN: Si habla veda, tiene a stiles disposición servicios gratuitos de asistencia lingüística. Llame al [phone].    We comply with applicable federal civil rights laws and [place] laws. We do not discriminate on the basis of race, color, national origin, age, disability, sex, sexual orientation, or gender identity.            Thank you!     Thank you for choosing [facility]  for your care. Our goal is always to provide you with excellent care. Hearing back from our patients is one way we can continue to improve our services. Please take a few minutes to complete the written survey that you may receive in the mail after your visit with us. Thank you!             Your Updated Medication List - Protect others around you: Learn how to safely use, store and throw away your medicines at www.disposemymeds.org.          This list is accurate as of: 10/12/17  5:15 PM.  Always use your most recent med list.                   Brand Name Dispense Instructions for use Diagnosis    ACAI PO      Take 1,000 mg by mouth 2 times daily    Breast cancer, unspecified laterality, Thyroid cancer (H), Chronic arthralgias of knees and hips, unspecified laterality       acyclovir 400 MG tablet    ZOVIRAX    30 tablet    Take 1 tablet (400 mg) by mouth 2 times daily    High grade B-cell lymphoma (H)       allopurinol 300 MG tablet    ZYLOPRIM    30 tablet    Take 1 tablet (300 mg) by mouth daily    High grade B-cell lymphoma (H)       aluminum chloride 20 % external solution    DRYSOL    60 mL    Apply topically At Bedtime    Rash, Intertrigo       AZMACORT IN      Inhale 2 puffs into the lungs as needed        BETAINE HCL PO      Take 2 tablets by mouth as needed Betaine HCL and Pepsin: Take 1-7 tabs by mouth daily, if burning take one tablet less once  daily. Betaine HCL 1.04 g Pepsin 40 mg        calcitRIOL 0.25 MCG capsule    ROCALTROL    270 capsule    2 tabs in am and 1 tab qhs    Postsurgical hypothyroidism, Papillary carcinoma, follicular variant (H), Metastasis to cervical lymph node (H)       CALCIUM CITRATE +D 315-250 MG-UNIT Tabs per tablet   Generic drug:  calcium citrate-vitamin D     120 tablet    Take 2 tablets by mouth 3 times daily        celecoxib 200 MG capsule    celeBREX    180 capsule    Take 1 capsule (200 mg) by mouth 2 times daily    Chest wall pain       cetirizine 10 MG tablet    ZYRTEC ALLERGY    30 tablet    Take 1 tablet (10 mg) by mouth 3 times daily On hold for lab test.    High grade B-cell lymphoma (H)       chlorhexidine 0.12 % solution    PERIDEX          COMPOUND CONTAINING CONTROLLED SUBSTANCE - PHARMACY TO MIX COMPOUNDED MEDICATION    CMPD RX    120 g    Apply small amount to affected area two times daily.    Neoplasm related pain       cromolyn 4 % ophthalmic solution    OPTICROM    10 mL    Place 1 drop into both eyes 4 times daily    Idiopathic mast cell activation syndrome (H)       cyclobenzaprine 10 MG tablet    FLEXERIL    42 tablet    Take 1 tablet (10 mg) by mouth 3 times daily as needed On hold Dr Monsalve    High grade B-cell lymphoma (H)       * dexamethasone 4 MG tablet    DECADRON    6 tablet    Take 1 tablet (4 mg) by mouth 2 times daily (with meals) for 3 days Or as directed by MD    High grade B-cell lymphoma (H)       * dexamethasone 4 MG tablet    DECADRON    4 tablet    Take 2 tablets (8 mg) by mouth daily for 2 days    High grade B-cell lymphoma (H)       diazepam 5 MG tablet    VALIUM    90 tablet    Take 1 tablet (5 mg) by mouth 3 times daily as needed For muscle spasms    Chest wall pain       diclofenac 1 % Gel topical gel    VOLTAREN    100 g    Apply affected area two times daily PRN using enclosed dosing card.    Myofascial pain       diphenhydrAMINE 50 MG capsule    BENADRYL    56 capsule    Take 1  capsule (50 mg) by mouth nightly as needed for itching or sleep    High grade B-cell lymphoma (H)       docusate sodium 100 MG tablet    COLACE    60 tablet    Take 100 mg by mouth daily    Acute constipation       EPINEPHrine 0.3 MG/0.3ML injection 2-pack    EPIPEN 2-CARIDAD    0.6 mL    Inject 0.3 mLs (0.3 mg) into the muscle once as needed for anaphylaxis        fluconazole 200 MG tablet    DIFLUCAN    30 tablet    Take 1 tablet (200 mg) by mouth daily    High grade B-cell lymphoma (H)       hydrochlorothiazide 12.5 MG capsule    MICROZIDE    90 capsule    Take 1 capsule (12.5 mg) by mouth daily    Hypocalcemia       levofloxacin 250 MG tablet    LEVAQUIN    14 tablet    Take 1 tablet (250 mg) by mouth daily    High grade B-cell lymphoma (H)       levothyroxine 112 MCG tablet    SYNTHROID/LEVOTHROID    189 tablet    Mon-Sat: (2 tabs daily) Sunday: 3 tabs    Postsurgical hypothyroidism, Papillary carcinoma, follicular variant (H), Postsurgical hypoparathyroidism (H), Thyroid cancer (H)       lidocaine 5 % ointment    XYLOCAINE    35.44 g    SANJANA TOPICALLY QID PRN    Chest wall pain       Lidocaine HCl Monohydrate Powd           lidocaine-prilocaine cream    EMLA    30 g    Apply topically as needed (port access pain.)    Neoplasm related pain, Chest wall pain       MAGNESIUM CITRATE PO      Take 400 mg by mouth daily        methocarbamol 750 MG tablet    ROBAXIN    360 tablet    Take 1 tablet (750 mg) by mouth 4 times daily as needed . Ok to take a 5th Robaxin on very severe days.    Myofascial pain       mometasone 110 MCG/INH inhaler    ASMANEX 30 METERED DOSES    3 Inhaler    Inhale 1 puff into the lungs daily    Intermittent asthma, uncomplicated       montelukast 10 MG tablet    SINGULAIR    120 tablet    TAKE TWO TABLETS BY MOUTH TWICE DAILY    Idiopathic mast cell activation syndrome (H)       morphine 30 MG 12 hr tablet    MS CONTIN    120 tablet    Take 1 tablet (30 mg) by mouth every 8 hours . Take an  addition pill at night such that your evening dose is 60mg    Neoplasm related pain       NASALCROM 5.2 MG/ACT Aers Inhaler   Generic drug:  cromolyn sodium     1 Bottle    SPRAY ONE SPRAY( 1 ML) IN NOSTRIL DAILY    Mast cell disease, systemic       OMEGA 3 PO      Take 5 mLs by mouth        ondansetron 8 MG ODT tab    ZOFRAN ODT    90 tablet    Take 1 tablet (8 mg) by mouth every 8 hours as needed for nausea or vomiting    Nausea with vomiting       * order for DME     2 Units    Equipment being ordered: compression stockings. 20-30 mm or 30 - 40 mm as patient can tolerate. Physical therapy to determine if they should be above or below the knee.    Venous stasis       * order for DME     2 Device    Equipment being ordered: compression bra    Malignant neoplasm of right female breast, unspecified site of breast       oxyCODONE 30 MG IR tablet    ROXICODONE    60 tablet    Take 1 tablet (30 mg) by mouth every 4 hours as needed for moderate to severe pain    High grade B-cell lymphoma (H)       polyethylene glycol powder    MIRALAX    510 g    Take 17 g by mouth daily    Acute constipation       potassium chloride SA 20 MEQ CR tablet    KLOR-CON    90 tablet    Take 1 tablet (20 mEq) by mouth daily    Hypokalemia       predniSONE 20 MG tablet    DELTASONE    60 tablet    Take 3 tablets (60 mg) by mouth 2 times daily for 10 days    High grade B-cell lymphoma (H)       PROBIOTIC DAILY PO      Take 1 capsule by mouth daily Lacto acid bifidobacterium    Breast cancer, unspecified laterality, Thyroid cancer (H), Chronic arthralgias of knees and hips, unspecified laterality       ranitidine 75 MG tablet    ZANTAC    270 tablet    Take 1 tablet (75 mg) by mouth 3 times daily    Mast cell disease, systemic       SUMAtriptan 50 MG tablet    IMITREX    5 tablet    Take 1 tablet (50 mg) by mouth at onset of headache for migraine (may repeat in 2 hours as needed, max 2 tablets daily)    Migraine without status migrainosus, not  intractable, unspecified migraine type       tacrolimus 0.1 % ointment    PROTOPIC    60 g    Apply topically 2 times daily    Rash, Intertrigo       tamoxifen 20 MG tablet    NOLVADEX    90 tablet    Take 1 tablet (20 mg) by mouth daily    Malignant neoplasm of female breast, unspecified laterality, unspecified site of breast       triamcinolone 0.025 % ointment    KENALOG    80 g    Apply topically 2 times daily Mix with 1 lb jar of vaseline or aquaphor    Intertrigo, Rash       TUMS 500 MG chewable tablet   Generic drug:  calcium carbonate     180 chew tab    Take 2 tablets (1,000 mg) by mouth nightly as needed for other (cramps)        * UNABLE TO FIND      1 tablet 3 times daily MEDICATION NAME: calcium D-Glucarate        * UNABLE TO FIND      2 tablets 3 times daily MEDICATION NAME: Natural D-Hist    Breast cancer, unspecified laterality, Papillary carcinoma, follicular variant (H), Chronic musculoskeletal pain       * UNABLE TO FIND      MEDICATION NAME: Tumeric 3 capsules daily        * UNABLE TO FIND      1 tablet 3 times daily MEDICATION NAME: Digestzymes    Thyroid cancer (H), Postsurgical hypothyroidism, Postsurgical hypoparathyroidism (H)       * UNABLE TO FIND      1 tablet daily MEDICATION NAME: Pure Encapsulations    Thyroid cancer (H), Postsurgical hypothyroidism, Postsurgical hypoparathyroidism (H)       VENTOLIN  (90 BASE) MCG/ACT Inhaler   Generic drug:  albuterol     18 g    INHALE 2 PUFFS INTO THE LUNGS EVERY 4 HOURS AS NEEDED FOR SHORTNESS OF BREATH OR DIFFICULT BREATHING OR WHEEZING    Mild intermittent asthma without complication       vitamin D3 2000 UNITS Caps     60 capsule    Take 10,000 Units by mouth daily    Thyroid cancer (H), Postsurgical hypothyroidism, Papillary carcinoma, follicular variant (H), Postsurgical hypoparathyroidism (H)       * Notice:  This list has 9 medication(s) that are the same as other medications prescribed for you. Read the directions carefully, and ask  your doctor or other care provider to review them with you.

## 2017-10-12 NOTE — PATIENT INSTRUCTIONS
Oncology: Controlling Nausea and Vomiting     Taken before meals, medicines can help ease nausea.    Nausea and vomiting are common side effects of chemotherapy and radiation therapy. Side effects happen when treatment changes some normal cells as well as cancer cells. In this case, the cells lining your stomach and the part of your brain that controls vomiting are affected.  Nausea is feeling that you need to throw up. Vomiting is when you actually do throw up. This is when your body forces food that is in your stomach out through your mouth.  Nausea and vomiting are common. They can be caused by many things. These include:    Stomach flu (gastroenteritis)    Food poisoning    Stomach pain (gastritis)    Blockages in the digestive system    Constipation    Infection    Anxiety and stress  They can also be caused by a head injury, an infection in the brain or inside the ear, or migraines. Other common causes of nausea and vomiting include:    Brain tumor    Brain bruise or injury    Motion sickness    Alcohol, pain medicines such as morphine, and cancer medicines (chemotherapy)    Certain medical treatments, such as radiation therapy    Poisonous things (toxins) such as plants or liquids that are swallowed by accident    Advanced types of cancer    Movement problems (psychogenic problems)  Extra pressure in the fluid that surrounds the brain and spinal cord (elevated intracranial pressure)  Sometimes belly (abdominal) pain and cramps are experienced along with nausea and vomiting. The symptoms can be mild and go away by themselves. Other symptoms can be serious and must be treated.  When to seek medical advice  Nausea and vomiting can happen before, during, or after cancer treatments. But it can be controlled. Don t consider it a normal part of cancer and cancer treatment. If not managed, it can become serious. It can change the fluid and chemical balances in your body, and could even keep you from getting cancer  treatment. Call your healthcare provider right away if any of the following occur:    You have nausea or vomiting that lasts 24 hours or more    You can t take your antiemetics, or they are not working    You have trouble keeping fluids down    You become dizzy, lightheaded, or confused    You have very dark urine or you stop urinating  Talk to your healthcare provider about your treatment and the nausea and vomiting management plan that's best for you. Be sure you know how and when to use antiemetics and when to call your provider.   Medicines can help  Nausea or vomiting can often be prevented or controlled with medicines called antiemetics. Your provider may give you antiemetics before or after treatment if you are getting chemotherapy or other treatments that cause nausea or vomiting. You may have to try different medicines or different combinations of medicines to get relief. But in nearly all cases, nausea and vomiting can be relieved.  Eating tips    If you have medicines to control nausea, take them before meals as directed.    Avoid fatty or greasy foods while nauseated.    Eat small meals slowly throughout the day.    Ask someone to sit with you while you eat to keep you from thinking about feeling nauseated.    Eat foods at room temperature or colder to avoid strong smells.    Eat dry foods, such as toast, crackers, or pretzels. Also eat cool, light foods, such as applesauce, and bland foods, such as oatmeal or skinned chicken.    Try to keep taking in clear fluids in small sips, or as ice chips, gelatin, or ice pops.  Other ways to feel better    Get a little fresh air. Take a short walk.    Talk to a friend, listen to music, or watch TV.    Take a few deep, slow breaths.    Eat by candlelight or in surroundings that you find relaxing.    Use a method to help you relax, such as guided imagery. Imagine yourself in a beautiful, restful scene. Or daydream about the place you d most like to be.  Date Last  Reviewed: 12/1/2016 2000-2017 [employer]. [address] [place] [address], [place] [address]. All rights reserved. This information is not intended as a substitute for professional medical care. Always follow your healthcare professional's instructions.        Neutropenia  White blood cells (WBCs) help protect the body from infection. Neutrophils are a type of white blood cell. Their main job is to help the body fight bacterial and fungal infections. Neutropenia occurs when there are fewer neutrophils in the blood than normal. It can range from mild to severe. This depends on the number of neutrophils in the blood. Severe neutropenia puts a person at higher risk for having more infections. Bacterial and fungal infections are most common. Your doctor can tell you more about your condition and whether it needs to be treated.    What causes neutropenia?  There are 2 main types of neutropenia: congenital and acquired. Each type has many causes:    Congenital neutropenia. These are the types that are present at birth. They are caused by certain rare genetic conditions, such as Kostmann s syndrome. Most often the neurtropenia is mild and normal for certain ethnic groups.    Acquired neutropenia. This type is not present at birth. Causes include:    Certain medicines, such as antibiotics and chemotherapy drugs    Certain autoimmune conditions    Certain viral, bacterial, or parasitic infections    Too little folate or vitamin B12 in the diet    Underlying bone marrow problem, such as leukemia or myelodysplastic syndrome (MDS)    Other causes  How is neutropenia diagnosed?  Your healthcare provider may check for neutropenia if you have frequent infections. Your provider may also check for neutropenia if you re having certain treatments, such as chemotherapy, which is known to cause a lower neutrophil count. Tests will be done to confirm the problem. These may include:    A complete blood cell count (CBC). This test  measures the amounts of the different types of cells in your blood. This includes the WBCs. The WBC count can be broken down further to find the number of neutrophils and immature neutrophils (bands) in your blood. This is called an absolute neutrophil count (ANC).    A blood smear. This test checks for the different types of blood cells in your blood and how they appear. A sample of your blood is spread on a glass slide and viewed under a microscope. A stain is used so the blood cells can be seen.    A bone marrow aspiration and biopsy. This test checks for problems with how your bone marrow makes blood cells. A needle is used to remove a sample of the bone marrow in your hipbone. The sample is then sent to a lab to be tested for problems.  How is neutropenia treated?    If there is a clear cause of neutropenia, it is addressed. For instance, if a medicine is the cause, it may be stopped or changed.    For mild cases, often no treatment is needed.    For moderate to severe cases, treatment is likely needed. This may include:    G-CSF (granulocyte-colony stimulating factor). This is a special type of protein. It helps promote the growth and activity of neutrophils. G-CSF is given by injection.    Bone marrow transplant. This treatment replaces diseased bone marrow cells with healthy cells from a matched donor. This treatment is only done in specific severe cases.  What is the long-term outcome of neutropenia?  The outcome of neutropenia varies for each person. For some people, neutropenia may resolve after a few weeks or months. For other people, it may be long-lasting. In these cases, ongoing care and treatment is needed. Your healthcare provider will talk to you more about what to expect from your condition.  When to call your healthcare provider  Call your healthcare provider right away if you have any of the following:    Fever of 100.4 F (38 C) or higher (call 911 or 24-hour urgent care   this is especially  important if you have severe neutropenia, which puts you at higher risk for life-threatening infection)    Cold sweat or chills    Chest pain or trouble breathing    Sore throat    Extreme tiredness or fatigue    Nausea and vomiting    Redness, warmth, or drainage from any open cuts or wounds    Pain or burning with urination; frequent urination    Pain, burning, or bleeding in the rectum    Severe constipation or diarrhea    Bloody stool or urine    How can I prevent infections?  With neutropenia, you need to take extra care to protect yourself from infection. Be sure to:    Wash your hands often, especially before eating and after using the bathroom. Use warm water and soap. Or use a hand gel that contains at least 60% alcohol.    Avoid close contact with others who may be ill.    Clean items you use often with disinfectant wipes. This includes phones and computer keyboards.    Avoid touching your eyes, nose, and mouth, especially if your hands are not clean.    Practice good oral hygiene. Use a soft toothbrush. Also, brush and floss your teeth gently.    Always wipe from front to back after a bowel movement.    Keep cuts and scrapes clean and covered until they heal.    Avoid sharing items such as towels, toothbrushes, razors, clothing, and sports equipment.    Store and handle foods safely to prevent food-borne illness.    Ask your healthcare provider if you need to take antibiotics before and after having any dental or medical procedures.    Ask your healthcare provider if you need to wear a special mask near construction sites or farm areas.  Date Last Reviewed: 12/1/2016 2000-2017 [employer]. [address] [place] [address], [place] [address]. All rights reserved. This information is not intended as a substitute for professional medical care. Always follow your healthcare professional's instructions.        Nutrition During Chemotherapy     Drink plenty of liquids, such as water.     During  chemotherapy, the energy provided by a healthy diet can help you rebuild normal cells. It can also help you keep up your strength and fight infection. As a result, you may feel better and be more able to cope with side effects. Ask your doctor about your nutrition needs.  Drink plenty of fluids    Fluids help the body produce urine and decrease constipation. They help prevent kidney and bladder problems. They also help replace fluids lost from vomiting and diarrhea.    Try water, unsweetened juices, and other flavored drinks without caffeine. They flush toxins from the body.  Get enough calories    Calories are fuel. The body uses this fuel to perform all of its functions, including healing.    It s OK to be lean, but be sure you are not underweight. If you are, try eating more calories.    Eat calorie-dense foods such as avocados, peanut butter, eggs, and ice cream.    If you need extra calories, add butter, gravy, and sauces to foods (if tolerated).    If you don't need the extra calories, try to limit foods that are fried, greasy, or high in fat or added sugar.  Eat protein, fruits, and vegetables    Protein builds muscle, bone, skin, and blood. It helps your body heal and fight infection. It also helps boost your energy level.    Good protein choices include yogurt, eggs, chicken, lean meats, beans, and peanut butter.    Fruits and vegetables are full of important vitamins, minerals, and fiber to help your body function properly.    Try to eat a variety of vegetables, fruits, whole grains, and beans.    Ask your doctor about instant protein powder or other supplements.  Eating right during treatment  Side effects may make it a little harder to eat well on some days. The following tips will help you continue to get the nutrition you need:    Be open to new foods and recipes.    Eat small portions often and slowly.    Have a healthy snack instead of a meal if you are not very hungry.    Try eating in a new  setting.    Physical activity, such as walking, can help increase your appetite. Try to be active for at least 30 minutes each day.    Boost your diet by getting the vitamins and minerals you need from fruits, vegetables, and whole grains.    If you live alone and are not up to cooking, ask your healthcare provider about Meals on Wheels or other outreach programs.    Sometimes, it is best to follow your appetitie. Eat when you are hungry, but when you ar enot, forcing yourself to eat can make you feel bad, nauseated, or even cause you to vomit.   Date Last Reviewed: 1/6/2016 2000-2017 [employer]. [address] [place] [address], [place] [address]. All rights reserved. This information is not intended as a substitute for professional medical care. Always follow your healthcare professional's instructions.

## 2017-10-12 NOTE — PROGRESS NOTES
"RN Care Coordination Note    Incoming Call:   Pt left writer voicemail this morning requesting call-back re: clarification of outpt appts, want to move Neulasta to after 4:15 today, wants to know what infusion is on 10/19  Nursing Action/Patient Instruction:  - message to [facility] scheduling to move Neulasta to 4:30 or today per pt's request    Outgoing Call:   [facility] RNCC post-hospitalization follow-up call  Assessment/Treatment Plan:   Pt discharged from [facility] on 10/11/2017 , admitted for: cycle 1 DA-REPOCH.  Per discharge summary, pt scheduled for:   [facility] 10/12/17 4:30 PM [facility]MP INJECTION UC ONCOLOGY INJECTION NURSE                 [facility] 10/16/17 4:45 PM UMP [facility] LAB DRAW UC [facility] LAB DRAW                 [facility] 10/16/17 5:20 PM UMP RETURN AKRITESHSUKI CAMELIA SHARON                 [facility] 10/19/17 11:30 AM UMP [facility]ON[facility] LAB DRAW UC MASONIC LAB DRAW                 [facility] 10/19/17 12:00 PM  12:00 PM UMP ONC INFUSION 180 UC ONCOLOGY INFUSION  UC 16 ATC     - Reviewed Discharge summary and Transition Communication Hand-off for Care Transitions to Next Level of Care Provider from Tejal Schrader RN at [facility]    Problem:/Patient concerns:  When asked how pt feels he/she is doing since leaving the hospital, he/she states: I feel tired  Taking medications as prescribed:  YES, waiting on a refill on one non-oncology med \"I'm fine\"  Fever/chills: NO  Nausea/vomiting: NO  Diarrhea/constipation: NO, taking senna plus and MiraLax or Colace to achieve daily soft bowel movements.  Abnormal signs of bleeding/bruising: NO, \"had an episode of BRB on toilet paper right before discharge yesterday\" has not happened again, urinating without difficulty  Other:  Pt voiced questions re: appts clarification, wants to adjust appts to accomodate her work schedule as much as possible    Intervention:  -Reviewed medications and upcoming appts, including those to be added on 10/17 (possible transfusion) and 10/23 for lab/SANJANA/possible transfusion per " Rashida's recommendations    -Reminded pt to call the [facility] Nurse Line [phone] if he/she experiences a temperature greater than 100.4, shaking chills,  uncontrolled nausea, vomiting and/or diarrhea, dizziness, shortness of breath, chest pain, bleeding, unexplained bruising, or if for any other new/concerning symptoms, questions or concerns.     -Reviewed infection exposure precautions.    - message sent to [facility] scheduling with the following appt changes/additions:  - possible PRBCs on Tuesday 10/17 (pt prefers afternoon if possible)   - lab/Rashida/possible PRBCs 10/23 - 2:50 Rashida slot is on hold, and she could see pt in infusion if needed   Also- asked if it is possible to get the 10/19 lab/possible 1unit PRBC appt to start at 0630 or 0645 instead of mid-day as currently scheduled per pt request    Evaluation:  Pt voiced understanding of above instructions and information and denied further questions for writer today      Laila Barrett, RN, BSN, OCN  Care Coordinator  [facility]

## 2017-10-12 NOTE — PROGRESS NOTES
Patient has nurse visit within 24-48 hours of Discharge so no post DC follow up call is needed          Oct 12, 2017  1:30 PM CDT   (Arrive by 1:15 PM)   INJECTION with  Oncology Injection Nurse   [facility]     [address] [address] [phone]

## 2017-10-12 NOTE — TELEPHONE ENCOUNTER
This patient was discharged from [facility] on 10/11/2017.    Discharge Diagnosis: High Grade B-Cell Lymphoma    A follow-up visit has not been scheduled.      Number of ED/ER visits in the last 12 months:  1     Please follow-up with patient.    Dionne Rich,

## 2017-10-13 DIAGNOSIS — C50.919 BREAST CANCER (H): Primary | ICD-10-CM

## 2017-10-13 NOTE — PROGRESS NOTES
Notified pt that [facility] scheduling team has not found an available infusion time for possible PRBCs on 10/17 as recommended by COTY Kuhn but that on 10/16 efforts will resume and she will be notified. She prefers early am now on Tuesday if possible.  Notified pt that Dr. Sauceda placed referral at consult to oncology dietitian and we have been unable to coordinate an appt for this yet. Pt states that she was seen inpt and would like to contact RD in future if needed.

## 2017-10-13 NOTE — NURSING NOTE
Patient presents to the [facility] infusion for Neulasta.  Order written by Dr. Sauceda was completed today. Name and  verified with patient. See MAR for medication details. Medication was given in the right arm. Patient tolerated injection well and was discharged to home.  Suzie Orellana CMA

## 2017-10-14 LAB
APPEARANCE CSF: CLEAR
COLOR CSF: COLORLESS
COPATH REPORT: NORMAL
RBC # CSF MANUAL: 1 /UL (ref 0–2)
TUBE # CSF: 3 #
WBC # CSF MANUAL: 0 /UL (ref 0–5)

## 2017-10-16 ENCOUNTER — ONCOLOGY VISIT (OUTPATIENT)
Dept: ONCOLOGY | Facility: CLINIC | Age: 57
End: 2017-10-16
Attending: PHYSICIAN ASSISTANT
Payer: COMMERCIAL

## 2017-10-16 ENCOUNTER — APPOINTMENT (OUTPATIENT)
Dept: LAB | Facility: CLINIC | Age: 57
End: 2017-10-16
Payer: COMMERCIAL

## 2017-10-16 VITALS
HEIGHT: 65 IN | BODY MASS INDEX: 32.4 KG/M2 | WEIGHT: 194.5 LBS | TEMPERATURE: 98.3 F | RESPIRATION RATE: 16 BRPM | SYSTOLIC BLOOD PRESSURE: 121 MMHG | DIASTOLIC BLOOD PRESSURE: 79 MMHG | HEART RATE: 115 BPM | OXYGEN SATURATION: 97 %

## 2017-10-16 DIAGNOSIS — C85.10 HIGH GRADE B-CELL LYMPHOMA (H): ICD-10-CM

## 2017-10-16 DIAGNOSIS — C83.33 DIFFUSE LARGE B-CELL LYMPHOMA OF INTRA-ABDOMINAL LYMPH NODES (H): Primary | ICD-10-CM

## 2017-10-16 DIAGNOSIS — R60.0 PERIPHERAL EDEMA: ICD-10-CM

## 2017-10-16 LAB
ABO + RH BLD: NORMAL
ABO + RH BLD: NORMAL
ALBUMIN SERPL-MCNC: 2.9 G/DL (ref 3.4–5)
ALP SERPL-CCNC: 45 U/L (ref 40–150)
ALT SERPL W P-5'-P-CCNC: 29 U/L (ref 0–50)
ANION GAP SERPL CALCULATED.3IONS-SCNC: 8 MMOL/L (ref 3–14)
AST SERPL W P-5'-P-CCNC: 7 U/L (ref 0–45)
BACTERIA SPEC CULT: NO GROWTH
BASOPHILS # BLD AUTO: 0 10E9/L (ref 0–0.2)
BASOPHILS NFR BLD AUTO: 0 %
BILIRUB SERPL-MCNC: 0.6 MG/DL (ref 0.2–1.3)
BLD GP AB SCN SERPL QL: NORMAL
BLD PROD TYP BPU: NORMAL
BLOOD BANK CMNT PATIENT-IMP: NORMAL
BUN SERPL-MCNC: 23 MG/DL (ref 7–30)
CALCIUM SERPL-MCNC: 7.2 MG/DL (ref 8.5–10.1)
CHLORIDE SERPL-SCNC: 99 MMOL/L (ref 94–109)
CO2 SERPL-SCNC: 31 MMOL/L (ref 20–32)
COPATH REPORT: NORMAL
CREAT SERPL-MCNC: 0.84 MG/DL (ref 0.52–1.04)
DIFFERENTIAL METHOD BLD: ABNORMAL
EOSINOPHIL # BLD AUTO: 0 10E9/L (ref 0–0.7)
EOSINOPHIL NFR BLD AUTO: 5 %
ERYTHROCYTE [DISTWIDTH] IN BLOOD BY AUTOMATED COUNT: 14.9 % (ref 10–15)
GFR SERPL CREATININE-BSD FRML MDRD: 70 ML/MIN/1.7M2
GLUCOSE SERPL-MCNC: 119 MG/DL (ref 70–99)
HCT VFR BLD AUTO: 34.8 % (ref 35–47)
HGB BLD-MCNC: 10.5 G/DL (ref 11.7–15.7)
LDH SERPL L TO P-CCNC: 194 U/L (ref 81–234)
LYMPHOCYTES # BLD AUTO: 0.3 10E9/L (ref 0.8–5.3)
LYMPHOCYTES NFR BLD AUTO: 69.4 %
MCH RBC QN AUTO: 24.5 PG (ref 26.5–33)
MCHC RBC AUTO-ENTMCNC: 30.2 G/DL (ref 31.5–36.5)
MCV RBC AUTO: 81 FL (ref 78–100)
MONOCYTES # BLD AUTO: 0 10E9/L (ref 0–1.3)
MONOCYTES NFR BLD AUTO: 1.7 %
NEUTROPHILS # BLD AUTO: 0.1 10E9/L (ref 1.6–8.3)
NEUTROPHILS NFR BLD AUTO: 23.9 %
NUM BPU REQUESTED: 2
PHOSPHATE SERPL-MCNC: 2.7 MG/DL (ref 2.5–4.5)
PLATELET # BLD AUTO: 133 10E9/L (ref 150–450)
POIKILOCYTOSIS BLD QL SMEAR: SLIGHT
POTASSIUM SERPL-SCNC: 3.6 MMOL/L (ref 3.4–5.3)
PROT SERPL-MCNC: 5.8 G/DL (ref 6.8–8.8)
RBC # BLD AUTO: 4.29 10E12/L (ref 3.8–5.2)
SODIUM SERPL-SCNC: 138 MMOL/L (ref 133–144)
SPECIMEN EXP DATE BLD: NORMAL
SPECIMEN SOURCE: NORMAL
URATE SERPL-MCNC: 3.3 MG/DL (ref 2.6–6)
WBC # BLD AUTO: 0.5 10E9/L (ref 4–11)

## 2017-10-16 PROCEDURE — 99212 OFFICE O/P EST SF 10 MIN: CPT | Mod: ZF

## 2017-10-16 PROCEDURE — 86850 RBC ANTIBODY SCREEN: CPT | Performed by: PHYSICIAN ASSISTANT

## 2017-10-16 PROCEDURE — 84100 ASSAY OF PHOSPHORUS: CPT | Performed by: PHYSICIAN ASSISTANT

## 2017-10-16 PROCEDURE — 85025 COMPLETE CBC W/AUTO DIFF WBC: CPT | Performed by: PHYSICIAN ASSISTANT

## 2017-10-16 PROCEDURE — 84550 ASSAY OF BLOOD/URIC ACID: CPT | Performed by: PHYSICIAN ASSISTANT

## 2017-10-16 PROCEDURE — 83615 LACTATE (LD) (LDH) ENZYME: CPT | Performed by: PHYSICIAN ASSISTANT

## 2017-10-16 PROCEDURE — 80053 COMPREHEN METABOLIC PANEL: CPT | Performed by: PHYSICIAN ASSISTANT

## 2017-10-16 PROCEDURE — 99215 OFFICE O/P EST HI 40 MIN: CPT | Mod: ZP | Performed by: PHYSICIAN ASSISTANT

## 2017-10-16 PROCEDURE — 25000128 H RX IP 250 OP 636: Mod: ZF | Performed by: PHYSICIAN ASSISTANT

## 2017-10-16 PROCEDURE — 86923 COMPATIBILITY TEST ELECTRIC: CPT | Performed by: PHYSICIAN ASSISTANT

## 2017-10-16 PROCEDURE — 36591 DRAW BLOOD OFF VENOUS DEVICE: CPT

## 2017-10-16 PROCEDURE — 86900 BLOOD TYPING SEROLOGIC ABO: CPT | Performed by: PHYSICIAN ASSISTANT

## 2017-10-16 PROCEDURE — 86901 BLOOD TYPING SEROLOGIC RH(D): CPT | Performed by: PHYSICIAN ASSISTANT

## 2017-10-16 RX ORDER — FLUCONAZOLE 200 MG/1
200 TABLET ORAL DAILY
Qty: 30 TABLET | Refills: 0 | Status: ON HOLD | OUTPATIENT
Start: 2017-10-16 | End: 2017-10-31

## 2017-10-16 RX ORDER — ALLOPURINOL 300 MG/1
300 TABLET ORAL DAILY
Qty: 30 TABLET | Refills: 0 | Status: ON HOLD | OUTPATIENT
Start: 2017-10-16 | End: 2017-10-31

## 2017-10-16 RX ORDER — DEXAMETHASONE 4 MG/1
4 TABLET ORAL 2 TIMES DAILY WITH MEALS
Qty: 60 TABLET | Refills: 0 | Status: ON HOLD | OUTPATIENT
Start: 2017-10-16 | End: 2017-10-31

## 2017-10-16 RX ORDER — LEVOFLOXACIN 250 MG/1
250 TABLET, FILM COATED ORAL DAILY
Qty: 30 TABLET | Refills: 0 | Status: ON HOLD | OUTPATIENT
Start: 2017-10-16 | End: 2017-10-31

## 2017-10-16 RX ORDER — HEPARIN SODIUM (PORCINE) LOCK FLUSH IV SOLN 100 UNIT/ML 100 UNIT/ML
5 SOLUTION INTRAVENOUS ONCE
Status: COMPLETED | OUTPATIENT
Start: 2017-10-16 | End: 2017-10-16

## 2017-10-16 RX ORDER — ACYCLOVIR 400 MG/1
400 TABLET ORAL 2 TIMES DAILY
Qty: 30 TABLET | Refills: 0 | Status: ON HOLD | OUTPATIENT
Start: 2017-10-16 | End: 2017-10-31

## 2017-10-16 RX ADMIN — SODIUM CHLORIDE, PRESERVATIVE FREE 5 ML: 5 INJECTION INTRAVENOUS at 16:48

## 2017-10-16 ASSESSMENT — PAIN SCALES - GENERAL: PAINLEVEL: EXTREME PAIN (8)

## 2017-10-16 NOTE — NURSING NOTE
"Chief Complaint   Patient presents with     Port Draw     Labs drawn from port by RN. Line flushed with saline and heparin. Vitals taken and pt checked in for appt     Port accessed with 20g 3/4\" gripper needle by RN, labs collected, line flushed with saline and heparin.  Vitals taken. Pt checked in for appointment(s).    Daly Hooper RN  "

## 2017-10-16 NOTE — NURSING NOTE
"Oncology Rooming Note    October 16, 2017 5:25 PM   Tisha Arias is a 57 year old female who presents for:    Chief Complaint   Patient presents with     Port Draw     Labs drawn from port by RN. Line flushed with saline and heparin. Vitals taken and pt checked in for appt     RECHECK     DLBCL (diffuse large B cell lymphoma)      Initial Vitals: /79 (BP Location: Left arm, Cuff Size: Adult Regular)  Pulse 115  Temp 98.3  F (36.8  C) (Oral)  Resp 16  Ht 1.651 m (5' 5\")  Wt 88.2 kg (194 lb 8 oz)  SpO2 97%  BMI 32.37 kg/m2 Estimated body mass index is 32.37 kg/(m^2) as calculated from the following:    Height as of this encounter: 1.651 m (5' 5\").    Weight as of this encounter: 88.2 kg (194 lb 8 oz). Body surface area is 2.01 meters squared.  Extreme Pain (8) Comment: Data Unavailable   No LMP recorded. Patient has had a hysterectomy.  Allergies reviewed: Yes  Medications reviewed: Yes    Medications: Medication refills not needed today.  Pharmacy name entered into [employer]: [id number] - [place], [place] - [address] [facility]    Clinical concerns: no clinical concerns  provider was notified.    7 minutes for nursing intake (face to face time)     Carlyn Smiley CMA              "

## 2017-10-16 NOTE — LETTER
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 She presented in 8/2017 with dramatically worse chest and back pain. CT 9/1/17 showed lytic lesion right 10th rib extending to right T9-10 neural foramen with vertebral body invasion. There was associated conglomerate of lymphadenopathy around the mid T-spine. She had a CT guided biopsy showing B-cell high grade lymphoma. Pathology showed \" The morphology shows a population of large cells, diffuse lymphoid infiltrate. The cells are atypical with abundant mitotic and apoptotic activity and single cell necrosis. Tumor is CD45 and CD79a positive and focally weakly CD30 positive. The other stains revealed positivity for CD20, BCL6, BCL2 and MUM1, and CD10 and CD21 negative. The CD5 and CD3 highlighted background T-cells.  MYC expression was equivocal with approximately 40% nuclei staining.  Ki-67 proliferative index is greater than 90-95%. The immunohistochemistry is suggestive of non-GCB immunophenotype of diffuse large B-cell lymphoma. The cytogenetics did not show rearrangement of the BCL2 or c-MYC.  There is the presence of BCL6 rearrangement in 78% of cells and gains of MYC and BCL2, but no amplifications. \"    Staging LP and BMBx were negative for lymphoma. She started DA-REPOCH 10/6/17. She did well with chemo other than rigors during CIVI. D/c 10/11/17. Given Neulasta 10/12/17. Presents today in hospital follow-up.     Interval History: Elvin presents alone today and is overall feeling well. She had some mucositis end of last " week which she sent a [employer] message about and was sent MMW. She has been using this with some relief. Has not been using salt and soda yet. Had some mild nausea but finding relief with antiemetics. Bowel movements have been overall regular. Her family set up her take home meds and she is somehow all out of prednisone, dex, allopurinol, ppx meds. She is not clear how much steroids she was on but high doses. She had difficulty sleeping due to this. Now has been off steroids since yesterday or day prior and feels chest tightness and sensation of SOB like she did upon presentation. No cough, fevers/chills, GERD, difficulties with bladder. She notes increase in peripheral edema since returning home. No pain or redness in legs. Needs new rx for compression stockings. Has questions regarding treatment and overall plan. She has been working. ROS otherwise negative.     Current Outpatient Prescriptions   Medication     dexamethasone (DECADRON) 4 MG tablet     allopurinol (ZYLOPRIM) 300 MG tablet     fluconazole (DIFLUCAN) 200 MG tablet     acyclovir (ZOVIRAX) 400 MG tablet     levofloxacin (LEVAQUIN) 250 MG tablet     order for DME     magic mouthwash suspension (diphenhydrAMINE, lidocaine, aluminum-magnesium & simethicone)     oxyCODONE (ROXICODONE) 30 MG IR tablet     cetirizine (ZYRTEC ALLERGY) 10 MG tablet     diphenhydrAMINE (BENADRYL) 50 MG capsule     cyclobenzaprine (FLEXERIL) 10 MG tablet     methocarbamol (ROBAXIN) 750 MG tablet     ondansetron (ZOFRAN-ODT) 8 MG ODT tab     COMPOUND CONTAINING CONTROLLED SUBSTANCE (CMPD RX) - PHARMACY TO MIX COMPOUNDED MEDICATION     morphine (MS CONTIN) 30 MG 12 hr tablet     lidocaine-prilocaine (EMLA) cream     diazepam (VALIUM) 5 MG tablet     levothyroxine (SYNTHROID/LEVOTHROID) 112 MCG tablet     hydrochlorothiazide (MICROZIDE) 12.5 MG capsule     SUMAtriptan (IMITREX) 50 MG tablet     celecoxib (CELEBREX) 200 MG capsule     montelukast (SINGULAIR) 10 MG tablet      "chlorhexidine (PERIDEX) 0.12 % solution     EPINEPHrine (EPIPEN 2-CARIDAD) 0.3 MG/0.3ML injection     potassium chloride SA (POTASSIUM CHLORIDE) 20 MEQ CR tablet     VENTOLIN  (90 BASE) MCG/ACT Inhaler     cromolyn (OPTICROM) 4 % ophthalmic solution     NASALCROM 5.2 MG/ACT AERS Inhaler     Cholecalciferol (VITAMIN D3) 2000 UNITS CAPS     lidocaine (XYLOCAINE) 5 % ointment     ranitidine (ZANTAC) 75 MG tablet     Lidocaine HCl Monohydrate POWD     calcitRIOL (ROCALTROL) 0.25 MCG capsule     aluminum chloride (DRYSOL) 20 % external solution     tacrolimus (PROTOPIC) 0.1 % ointment     triamcinolone (KENALOG) 0.025 % ointment     diclofenac (VOLTAREN) 1 % GEL     mometasone (ASMANEX 30 METERED DOSES) 110 MCG/INH inhaler     MAGNESIUM CITRATE PO     calcium citrate-vitamin D (CALCIUM CITRATE +D) 315-250 MG-UNIT TABS     UNABLE TO FIND     Triamcinolone Acetonide (AZMACORT IN)     UNABLE TO FIND     polyethylene glycol (MIRALAX) powder     docusate sodium 100 MG tablet     order for DME     order for DME     tamoxifen (NOLVADEX) 20 MG tablet     UNABLE TO FIND     UNABLE TO FIND     calcium carbonate (TUMS) 500 MG chewable tablet     UNABLE TO FIND     Omega-3 Fatty Acids (OMEGA 3 PO)     Probiotic Product (PROBIOTIC DAILY PO)     No current facility-administered medications for this visit.        PHYSICAL EXAM:  /79 (BP Location: Left arm, Cuff Size: Adult Regular)  Pulse 115  Temp 98.3  F (36.8  C) (Oral)  Resp 16  Ht 1.651 m (5' 5\")  Wt 88.2 kg (194 lb 8 oz)  SpO2 97%  BMI 32.37 kg/m2  General: Alert, oriented, pleasant, NAD  HEENT: Normocephalic, atraumatic, PERRLA, EOMI. Moist mucus membranes, no lesions or thrush  Neck: No cervical or supraclavicular LAD.  Axillary: No LAD  Lungs: CTA bilaterally, normal work of breathing  Cardiac: RRR, S1, S2, no murmurs  Abdomen: Soft, nontender, nondistended. Normoactive bowel sounds. No hepatosplenomegaly, masses  Neuro: CNII-XII grossly intact  Extremities: 2+ " "pedal edema up to mid-thighs b/l, equal     Labs:    10/16/2017 16:54   Sodium 138   Potassium 3.6   Chloride 99   Carbon Dioxide 31   Urea Nitrogen 23   Creatinine 0.84   GFR Estimate 70   GFR Estimate If Black 85   Calcium 7.2 (L)   Anion Gap 8   Phosphorus 2.7   Albumin 2.9 (L)   Protein Total 5.8 (L)   Bilirubin Total 0.6   Alkaline Phosphatase 45   ALT 29   AST 7   Lactate Dehydrogenase 194   Uric Acid 3.3   Glucose 119 (H)   WBC 0.5 (LL)   Hemoglobin 10.5 (L)   Hematocrit 34.8 (L)   Platelet Count 133 (L)   RBC Count 4.29   MCV 81   MCH 24.5 (L)   MCHC 30.2 (L)   RDW 14.9   Diff Method Manual Differential   % Neutrophils 23.9   % Lymphocytes 69.4   % Monocytes 1.7   % Eosinophils 5.0   % Basophils 0.0   Absolute Neutrophil 0.1 (LL)   Absolute Lymphocytes 0.3 (L)   Absolute Monocytes 0.0   Absolute Eosinophils 0.0   Absolute Basophils 0.0   Poikilocytosis Slight   ABO O   RH(D) Pos   Antibody Screen Neg   Test Valid Only At [facility].   Specimen Expires 10/19/2017   Blood Component Type Red Blood Cells L...   Unit Number [id number]   Division Number [id number]   Status of Unit No longer availab...   Crossmatch Red Blood Cells   Unit Status RET       Assessment & Plan:     1. DLBCL, \"double-hit\"-like: S/p 1 cycle of DA-R-EPOCH. She tolerated chemotherapy overall fairly well with some mucositis last week and mild infusion reaction to continuous infusion of chemotherapy. This responded well to e-meds and was able to be resumed without difficulty. No evidence of TLS on labs, remain on allopurinol for 1 month. She is at her toro today and ANC is 100 so will likely not dose escalate with cycle 2 per dosing parameters. She will be due for admission to cycle 2 on 10/27. Plan for restaging following 3 cycles per Dr. Sauceda. She did have some questions regarding prognosis for cure, I discussed with Dr. Sauceda who will clarify with her. Weekly follow-up for now.     2. HEME: Hgb 10.5, platelets 133K, WBC " 0.5/ANC 0.1. Transfuse if Hgb <8 and platelets <10K. Needs twice weekly count checks and tentative transfusions. No transfusion needs today however I did sign blood consent with her.     3. ID: No active concerns. Reviewed neutropenic precautions and stressed importance of taking  mg BID, levaquin 250 mg daily, and fluconazole 200 mg daily.     4. Chest tightness/sensation of SOB: From lymphoma. Okay to resume dex 4 mg BID now that prednisone is completed. Will hopefully be able to taper soon.     5. Mucositis: Start salt and soda rinses 2-3x daily. MMW prn.     6. Peripheral edema: Likely secondary to high doses of steroids from several days and some third spacing of IVFs. New Rx for compression stockings. Elevate legs as able.     7. Chronic pain: Palliative care will still prescribe chronic pain meds.     Greater than 40 minutes was spent with this patient with greater than 20 minutes spent in counseling and coordination of care.    Rashida Jaffe PA-C    [facility]  [address] [place] [id number] [phone]

## 2017-10-16 NOTE — MR AVS SNAPSHOT
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  [address] [facility] [address]   [phone]            Oct 19, 2017  7:00 AM CDT   Infusion 180 with  ONCOLOGY INFUSION, UC 12 ATC   [facility] ([facility])    [address]  2nd Floor  [facility] [address]   [phone]            Oct 23, 2017  2:50 PM CDT   (Arrive by 2:35 PM)   Return Visit with COTY Jessica   [facility] ([facility])    [address]  [address] [facility] [address]   [phone]            Oct 27, 2017  6:30 AM CDT   M[facility]ic Lab Draw with  MASONIC LAB DRAW   [facility] Lab Draw ([facility])    [address]  [address] [facility] [address]   [phone]            Oct 27, 2017  7:00 AM CDT   (Arrive by 6:45 AM)   Return Visit with Jessica Cox PA-C   [facility] ([facility])    [address]  2nd Floor  [facility] [address]   [phone]            Nov 02, 2017  5:00 PM CDT   US HEAD NECK SOFT TISSUE with UCUS3   [facility] ([facility])    [address]  1st Floor  [facility] [address] [phone]            Please bring a list of your medicines (including vitamins, minerals and over-the-counter drugs). Also, tell your doctor about any allergies you may have. Wear comfortable clothes and leave your valuables at home.  You do not need to do anything special to prepare for your exam.  Please call the Imaging Department at your exam site with any questions.            Nov 02, 2017  6:00 PM CDT   LAB with  LAB   [facility] ([facility])    [address] [place] [address] [phone]           Patient must bring picture ID. Patient should be prepared to give a urine specimen  Please do not eat 10-12 hours before your appointment if you are coming in fasting for labs on lipids, cholesterol, or glucose (sugar). Pregnant women should follow their Care Team instructions. Water with medications is okay. Do not drink coffee or other fluids. If you have concerns about taking  your medications, please ask at office or if scheduling via [facility], send a message by clicking on Secure Messaging, Message Your Care Team.            Nov 13, 2017  4:30 PM CST   (Arrive by 4:15 PM)   RETURN ENDOCRINE with Avelina Castro MD   [facility] ([facility])    [address] [place] [id number] [phone]            Nov 27, 2017  4:00 PM CST   (Arrive by 3:45 PM)   Return Visit with Shahla Crawley MD   [facility] ([facility])    [address] [place] [id number] [phone]              Future tests that were ordered for you today     Open Standing Orders        Priority Remaining Interval Expires Ordered    Red blood cell prepare order unit Routine 99/100 CONDITIONAL (SPECIFY) BLOOD  10/16/2017            Who to contact     If you have questions or need follow up information about today's clinic visit or your schedule please contact [facility]  [facility] directly at [phone].  Normal or non-critical lab and imaging results will be communicated to you by MyChart, letter or phone within 4 business days after the clinic has received the results. If you do not hear from us within 7 days, please contact the clinic through joizhart or phone. If you have a critical or abnormal lab result, we will notify you by phone as soon as possible.  Submit refill requests through [employer] or call your pharmacy and they will forward the refill request to us. Please allow 3 business days for your refill to be completed.          Additional Information About Your Visit        [employer] Information     [employer] gives you secure access to your electronic health record. If you see a primary care provider, you can also send messages to your care team and make appointments. If you have questions, please call your primary care clinic.  If you do not have a primary care provider, please call [phone] and they will assist you.        Care EveryWhere ID     This is your Care EveryWhere ID. This could be used by other organizations to access your [place] medical records  [id number]        Your Vitals Were     Pulse Temperature Respirations Height Pulse Oximetry BMI (Body Mass Index)    115 98.3  F (36.8  C) (Oral) 16 1.651 m (5' 5\") 97% 32.37 kg/m2       Blood Pressure from Last 3 Encounters:   10/16/17 121/79   10/11/17 134/82   10/04/17 120/70    Weight from Last 3 Encounters:   10/16/17 88.2 kg (194 lb 8 oz)   10/11/17 87.9 kg (193 lb 12.6 oz)   10/04/17 87.7 kg (193 lb 6.4 oz)              We Performed the Following     ABO/Rh type and screen     Blood component     Blood component     CBC with platelets differential     Comprehensive metabolic panel     Lactate Dehydrogenase     Phosphorus     Uric acid          Today's Medication Changes          These changes are accurate as of: 10/16/17 11:59 PM.  If you have any questions, ask your nurse or doctor.               These " medicines have changed or have updated prescriptions.        Dose/Directions    * dexamethasone 4 MG tablet   Commonly known as:  DECADRON   This may have changed:  Another medication with the same name was added. Make sure you understand how and when to take each.   Used for:  High grade B-cell lymphoma (H)        Dose:  4 mg   Take 1 tablet (4 mg) by mouth 2 times daily (with meals) for 3 days Or as directed by MD   Quantity:  6 tablet   Refills:  0       * dexamethasone 4 MG tablet   Commonly known as:  DECADRON   This may have changed:  Another medication with the same name was added. Make sure you understand how and when to take each.   Used for:  High grade B-cell lymphoma (H)        Dose:  8 mg   Take 2 tablets (8 mg) by mouth daily for 2 days   Quantity:  4 tablet   Refills:  0       * dexamethasone 4 MG tablet   Commonly known as:  DECADRON   This may have changed:  You were already taking a medication with the same name, and this prescription was added. Make sure you understand how and when to take each.   Used for:  High grade B-cell lymphoma (H)   Changed by:  Rashida Jaffe PA        Dose:  4 mg   Take 1 tablet (4 mg) by mouth 2 times daily (with meals) Or as directed by MD   Quantity:  60 tablet   Refills:  0       diazepam 5 MG tablet   Commonly known as:  VALIUM   This may have changed:    - when to take this  - additional instructions   Used for:  Chest wall pain        Dose:  5 mg   Take 1 tablet (5 mg) by mouth 3 times daily as needed For muscle spasms   Quantity:  90 tablet   Refills:  2       * order for DME   This may have changed:  Another medication with the same name was added. Make sure you understand how and when to take each.   Used for:  Venous stasis   Changed by:  Chandni Orona MD        Equipment being ordered: compression stockings. 20-30 mm or 30 - 40 mm as patient can tolerate. Physical therapy to determine if they should be above or below the knee.   Quantity:  2  Units   Refills:  4       * order for DME   This may have changed:  Another medication with the same name was added. Make sure you understand how and when to take each.   Used for:  Malignant neoplasm of right female breast, unspecified site of breast   Changed by:  Chandni Orona MD        Equipment being ordered: compression bra   Quantity:  2 Device   Refills:  1       * order for DME   This may have changed:  You were already taking a medication with the same name, and this prescription was added. Make sure you understand how and when to take each.   Used for:  Peripheral edema   Changed by:  Rashida Jaffe PA        Compression stockings, medium grade, please measure and fit. Please dispense a pair.   Quantity:  1 Units   Refills:  0       * Notice:  This list has 6 medication(s) that are the same as other medications prescribed for you. Read the directions carefully, and ask your doctor or other care provider to review them with you.      Stop taking these medicines if you haven't already. Please contact your care team if you have questions.     ACAI PO   Stopped by:  Rashida Jaffe PA           BETAINE HCL PO   Stopped by:  Rashida Jaffe PA                Where to get your medicines      These medications were sent to [id number] - [place], [place] - [address] [address] [place]  [address] [address] [place] [address]     Phone:  [phone]     acyclovir 400 MG tablet    allopurinol 300 MG tablet    dexamethasone 4 MG tablet    fluconazole 200 MG tablet    levofloxacin 250 MG tablet         Some of these will need a paper prescription and others can be bought over the counter.  Ask your nurse if you have questions.     Bring a paper prescription for each of these medications     order for DME                Primary Care Provider Office Phone # Fax #    Shahla Crawley [phone]       [address]  [facility] [address]        Equal Access to  Services     [facility]: Hadii sacha marley daria Jeffreyali, waaxda luqadaha, qaybta kaalmada daina, ranjan yoder . So [facility] [phone].    ATENCIÓN: Si gladysla veda, tiene a stiles disposición servicios gratuitos de asistencia lingüística. Llame al [phone].    We comply with applicable federal civil rights laws and [place] laws. We do not discriminate on the basis of race, color, national origin, age, disability, sex, sexual orientation, or gender identity.            Thank you!     Thank you for choosing [facility]  for your care. Our goal is always to provide you with excellent care. Hearing back from our patients is one way we can continue to improve our services. Please take a few minutes to complete the written survey that you may receive in the mail after your visit with us. Thank you!             Your Updated Medication List - Protect others around you: Learn how to safely use, store and throw away your medicines at www.disposemymeds.org.          This list is accurate as of: 10/16/17 11:59 PM.  Always use your most recent med list.                   Brand Name Dispense Instructions for use Diagnosis    acyclovir 400 MG tablet    ZOVIRAX    30 tablet    Take 1 tablet (400 mg) by mouth 2 times daily    High grade B-cell lymphoma (H)       allopurinol 300 MG tablet    ZYLOPRIM    30 tablet    Take 1 tablet (300 mg) by mouth daily    High grade B-cell lymphoma (H)       aluminum chloride 20 % external solution    DRYSOL    60 mL    Apply topically At Bedtime    Rash, Intertrigo       AZMACORT IN      Inhale 2 puffs into the lungs as needed        calcitRIOL 0.25 MCG capsule    ROCALTROL    270 capsule    2 tabs in am and 1 tab qhs    Postsurgical hypothyroidism, Papillary carcinoma, follicular variant (H), Metastasis to cervical lymph node (H)       CALCIUM CITRATE +D 315-250 MG-UNIT Tabs per tablet   Generic drug:  calcium citrate-vitamin D     120 tablet     Take 2 tablets by mouth 3 times daily        celecoxib 200 MG capsule    celeBREX    180 capsule    Take 1 capsule (200 mg) by mouth 2 times daily    Chest wall pain       cetirizine 10 MG tablet    ZYRTEC ALLERGY    30 tablet    Take 1 tablet (10 mg) by mouth 3 times daily On hold for lab test.    High grade B-cell lymphoma (H)       chlorhexidine 0.12 % solution    PERIDEX          COMPOUND CONTAINING CONTROLLED SUBSTANCE - PHARMACY TO MIX COMPOUNDED MEDICATION    CMPD RX    120 g    Apply small amount to affected area two times daily.    Neoplasm related pain       cromolyn 4 % ophthalmic solution    OPTICROM    10 mL    Place 1 drop into both eyes 4 times daily    Idiopathic mast cell activation syndrome (H)       cyclobenzaprine 10 MG tablet    FLEXERIL    42 tablet    Take 1 tablet (10 mg) by mouth 3 times daily as needed On hold Dr Monsalve    High grade B-cell lymphoma (H)       * dexamethasone 4 MG tablet    DECADRON    6 tablet    Take 1 tablet (4 mg) by mouth 2 times daily (with meals) for 3 days Or as directed by MD    High grade B-cell lymphoma (H)       * dexamethasone 4 MG tablet    DECADRON    4 tablet    Take 2 tablets (8 mg) by mouth daily for 2 days    High grade B-cell lymphoma (H)       * dexamethasone 4 MG tablet    DECADRON    60 tablet    Take 1 tablet (4 mg) by mouth 2 times daily (with meals) Or as directed by MD    High grade B-cell lymphoma (H)       diazepam 5 MG tablet    VALIUM    90 tablet    Take 1 tablet (5 mg) by mouth 3 times daily as needed For muscle spasms    Chest wall pain       diclofenac 1 % Gel topical gel    VOLTAREN    100 g    Apply affected area two times daily PRN using enclosed dosing card.    Myofascial pain       diphenhydrAMINE 50 MG capsule    BENADRYL    56 capsule    Take 1 capsule (50 mg) by mouth nightly as needed for itching or sleep    High grade B-cell lymphoma (H)       docusate sodium 100 MG tablet    COLACE    60 tablet    Take 100 mg by mouth daily     Acute constipation       EPINEPHrine 0.3 MG/0.3ML injection 2-pack    EPIPEN 2-CARIDAD    0.6 mL    Inject 0.3 mLs (0.3 mg) into the muscle once as needed for anaphylaxis        fluconazole 200 MG tablet    DIFLUCAN    30 tablet    Take 1 tablet (200 mg) by mouth daily    High grade B-cell lymphoma (H)       hydrochlorothiazide 12.5 MG capsule    MICROZIDE    90 capsule    Take 1 capsule (12.5 mg) by mouth daily    Hypocalcemia       levofloxacin 250 MG tablet    LEVAQUIN    30 tablet    Take 1 tablet (250 mg) by mouth daily    High grade B-cell lymphoma (H)       levothyroxine 112 MCG tablet    SYNTHROID/LEVOTHROID    189 tablet    Mon-Sat: (2 tabs daily) Sunday: 3 tabs    Postsurgical hypothyroidism, Papillary carcinoma, follicular variant (H), Postsurgical hypoparathyroidism (H), Thyroid cancer (H)       lidocaine 5 % ointment    XYLOCAINE    35.44 g    SANJANA TOPICALLY QID PRN    Chest wall pain       Lidocaine HCl Monohydrate Powd           lidocaine visc 2% 2.5mL/5mL & maalox/mylanta w/ simeth 2.5mL/5mL & diphenhydrAMINE 5mg/5mL Susp suspension    [facility]    360 mL    Swish and spit 10 mLs in mouth every 6 hours as needed for mouth sores    DLBCL (diffuse large B cell lymphoma) (H), Stomatitis and mucositis       lidocaine-prilocaine cream    EMLA    30 g    Apply topically as needed (port access pain.)    Neoplasm related pain, Chest wall pain       MAGNESIUM CITRATE PO      Take 400 mg by mouth daily        methocarbamol 750 MG tablet    ROBAXIN    360 tablet    Take 1 tablet (750 mg) by mouth 4 times daily as needed . Ok to take a 5th Robaxin on very severe days.    Myofascial pain       mometasone 110 MCG/INH inhaler    ASMANEX 30 METERED DOSES    3 Inhaler    Inhale 1 puff into the lungs daily    Intermittent asthma, uncomplicated       montelukast 10 MG tablet    SINGULAIR    120 tablet    TAKE TWO TABLETS BY MOUTH TWICE DAILY    Idiopathic mast cell activation syndrome (H)       morphine 30 MG  12 hr tablet    MS CONTIN    120 tablet    Take 1 tablet (30 mg) by mouth every 8 hours . Take an addition pill at night such that your evening dose is 60mg    Neoplasm related pain       NASALCROM 5.2 MG/ACT Aers Inhaler   Generic drug:  cromolyn sodium     1 Bottle    SPRAY ONE SPRAY( 1 ML) IN NOSTRIL DAILY    Mast cell disease, systemic       OMEGA 3 PO      Take 5 mLs by mouth        ondansetron 8 MG ODT tab    ZOFRAN ODT    90 tablet    Take 1 tablet (8 mg) by mouth every 8 hours as needed for nausea or vomiting    Nausea with vomiting       * order for DME     2 Units    Equipment being ordered: compression stockings. 20-30 mm or 30 - 40 mm as patient can tolerate. Physical therapy to determine if they should be above or below the knee.    Venous stasis       * order for DME     2 Device    Equipment being ordered: compression bra    Malignant neoplasm of right female breast, unspecified site of breast       * order for DME     1 Units    Compression stockings, medium grade, please measure and fit. Please dispense a pair.    Peripheral edema       oxyCODONE 30 MG IR tablet    ROXICODONE    60 tablet    Take 1 tablet (30 mg) by mouth every 4 hours as needed for moderate to severe pain    High grade B-cell lymphoma (H)       polyethylene glycol powder    MIRALAX    510 g    Take 17 g by mouth daily    Acute constipation       potassium chloride SA 20 MEQ CR tablet    KLOR-CON    90 tablet    Take 1 tablet (20 mEq) by mouth daily    Hypokalemia       predniSONE 20 MG tablet    DELTASONE    60 tablet    Take 3 tablets (60 mg) by mouth 2 times daily for 10 days    High grade B-cell lymphoma (H)       PROBIOTIC DAILY PO      Take 1 capsule by mouth daily Lacto acid bifidobacterium    Breast cancer, unspecified laterality, Thyroid cancer (H), Chronic arthralgias of knees and hips, unspecified laterality       ranitidine 75 MG tablet    ZANTAC    270 tablet    Take 1 tablet (75 mg) by mouth 3 times daily    Mast cell  disease, systemic       SUMAtriptan 50 MG tablet    IMITREX    5 tablet    Take 1 tablet (50 mg) by mouth at onset of headache for migraine (may repeat in 2 hours as needed, max 2 tablets daily)    Migraine without status migrainosus, not intractable, unspecified migraine type       tacrolimus 0.1 % ointment    PROTOPIC    60 g    Apply topically 2 times daily    Rash, Intertrigo       tamoxifen 20 MG tablet    NOLVADEX    90 tablet    Take 1 tablet (20 mg) by mouth daily    Malignant neoplasm of female breast, unspecified laterality, unspecified site of breast       triamcinolone 0.025 % ointment    KENALOG    80 g    Apply topically 2 times daily Mix with 1 lb jar of vaseline or aquaphor    Intertrigo, Rash       TUMS 500 MG chewable tablet   Generic drug:  calcium carbonate     180 chew tab    Take 2 tablets (1,000 mg) by mouth nightly as needed for other (cramps)        * UNABLE TO FIND      1 tablet 3 times daily MEDICATION NAME: calcium D-Glucarate        * UNABLE TO FIND      2 tablets 3 times daily MEDICATION NAME: Natural D-Hist    Breast cancer, unspecified laterality, Papillary carcinoma, follicular variant (H), Chronic musculoskeletal pain       * UNABLE TO FIND      MEDICATION NAME: Tumeric 3 capsules daily        * UNABLE TO FIND      1 tablet 3 times daily MEDICATION NAME: Digestzymes    Thyroid cancer (H), Postsurgical hypothyroidism, Postsurgical hypoparathyroidism (H)       * UNABLE TO FIND      1 tablet daily MEDICATION NAME: Pure Encapsulations    Thyroid cancer (H), Postsurgical hypothyroidism, Postsurgical hypoparathyroidism (H)       VENTOLIN  (90 BASE) MCG/ACT Inhaler   Generic drug:  albuterol     18 g    INHALE 2 PUFFS INTO THE LUNGS EVERY 4 HOURS AS NEEDED FOR SHORTNESS OF BREATH OR DIFFICULT BREATHING OR WHEEZING    Mild intermittent asthma without complication       vitamin D3 2000 UNITS Caps     60 capsule    Take 10,000 Units by mouth daily    Thyroid cancer (H), Postsurgical  hypothyroidism, Papillary carcinoma, follicular variant (H), Postsurgical hypoparathyroidism (H)       * Notice:  This list has 11 medication(s) that are the same as other medications prescribed for you. Read the directions carefully, and ask your doctor or other care provider to review them with you.

## 2017-10-16 NOTE — LETTER
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 She presented in 8/2017 with dramatically worse chest and back pain. CT 9/1/17 showed lytic lesion right 10th rib extending to right T9-10 neural foramen with vertebral body invasion. There was associated conglomerate of lymphadenopathy around the mid T-spine. She had a CT guided biopsy showing B-cell high grade lymphoma. Pathology showed \" The morphology shows a population of large cells, diffuse lymphoid infiltrate. The cells are atypical with abundant mitotic and apoptotic activity and single cell necrosis. Tumor is CD45 and CD79a positive and focally weakly CD30 positive. The other stains revealed positivity for CD20, BCL6, BCL2 and MUM1, and CD10 and CD21 negative. The CD5 and CD3 highlighted background T-cells.  MYC expression was equivocal with approximately 40% nuclei staining.  Ki-67 proliferative index is greater than 90-95%. The immunohistochemistry is suggestive of non-GCB immunophenotype of diffuse large B-cell lymphoma. The cytogenetics did not show rearrangement of the BCL2 or c-MYC.  There is the presence of BCL6 rearrangement in 78% of cells and gains of MYC and BCL2, but no amplifications. \"    Staging LP and BMBx were negative for lymphoma. She started DA-REPOCH 10/6/17. She did well with chemo other than rigors during CIVI. D/c 10/11/17. Given Neulasta 10/12/17. Presents today in hospital follow-up.     Interval History: Elvin presents alone today and is overall feeling well. She had some mucositis end of last " week which she sent a [employer] message about and was sent MMW. She has been using this with some relief. Has not been using salt and soda yet. Had some mild nausea but finding relief with antiemetics. Bowel movements have been overall regular. Her family set up her take home meds and she is somehow all out of prednisone, dex, allopurinol, ppx meds. She is not clear how much steroids she was on but high doses. She had difficulty sleeping due to this. Now has been off steroids since yesterday or day prior and feels chest tightness and sensation of SOB like she did upon presentation. No cough, fevers/chills, GERD, difficulties with bladder. She notes increase in peripheral edema since returning home. No pain or redness in legs. Needs new rx for compression stockings. Has questions regarding treatment and overall plan. She has been working. ROS otherwise negative.     Current Outpatient Prescriptions   Medication     dexamethasone (DECADRON) 4 MG tablet     allopurinol (ZYLOPRIM) 300 MG tablet     fluconazole (DIFLUCAN) 200 MG tablet     acyclovir (ZOVIRAX) 400 MG tablet     levofloxacin (LEVAQUIN) 250 MG tablet     order for DME     magic mouthwash suspension (diphenhydrAMINE, lidocaine, aluminum-magnesium & simethicone)     oxyCODONE (ROXICODONE) 30 MG IR tablet     cetirizine (ZYRTEC ALLERGY) 10 MG tablet     diphenhydrAMINE (BENADRYL) 50 MG capsule     cyclobenzaprine (FLEXERIL) 10 MG tablet     methocarbamol (ROBAXIN) 750 MG tablet     ondansetron (ZOFRAN-ODT) 8 MG ODT tab     COMPOUND CONTAINING CONTROLLED SUBSTANCE (CMPD RX) - PHARMACY TO MIX COMPOUNDED MEDICATION     morphine (MS CONTIN) 30 MG 12 hr tablet     lidocaine-prilocaine (EMLA) cream     diazepam (VALIUM) 5 MG tablet     levothyroxine (SYNTHROID/LEVOTHROID) 112 MCG tablet     hydrochlorothiazide (MICROZIDE) 12.5 MG capsule     SUMAtriptan (IMITREX) 50 MG tablet     celecoxib (CELEBREX) 200 MG capsule     montelukast (SINGULAIR) 10 MG tablet      "chlorhexidine (PERIDEX) 0.12 % solution     EPINEPHrine (EPIPEN 2-CARIDAD) 0.3 MG/0.3ML injection     potassium chloride SA (POTASSIUM CHLORIDE) 20 MEQ CR tablet     VENTOLIN  (90 BASE) MCG/ACT Inhaler     cromolyn (OPTICROM) 4 % ophthalmic solution     NASALCROM 5.2 MG/ACT AERS Inhaler     Cholecalciferol (VITAMIN D3) 2000 UNITS CAPS     lidocaine (XYLOCAINE) 5 % ointment     ranitidine (ZANTAC) 75 MG tablet     Lidocaine HCl Monohydrate POWD     calcitRIOL (ROCALTROL) 0.25 MCG capsule     aluminum chloride (DRYSOL) 20 % external solution     tacrolimus (PROTOPIC) 0.1 % ointment     triamcinolone (KENALOG) 0.025 % ointment     diclofenac (VOLTAREN) 1 % GEL     mometasone (ASMANEX 30 METERED DOSES) 110 MCG/INH inhaler     MAGNESIUM CITRATE PO     calcium citrate-vitamin D (CALCIUM CITRATE +D) 315-250 MG-UNIT TABS     UNABLE TO FIND     Triamcinolone Acetonide (AZMACORT IN)     UNABLE TO FIND     polyethylene glycol (MIRALAX) powder     docusate sodium 100 MG tablet     order for DME     order for DME     tamoxifen (NOLVADEX) 20 MG tablet     UNABLE TO FIND     UNABLE TO FIND     calcium carbonate (TUMS) 500 MG chewable tablet     UNABLE TO FIND     Omega-3 Fatty Acids (OMEGA 3 PO)     Probiotic Product (PROBIOTIC DAILY PO)     No current facility-administered medications for this visit.        PHYSICAL EXAM:  /79 (BP Location: Left arm, Cuff Size: Adult Regular)  Pulse 115  Temp 98.3  F (36.8  C) (Oral)  Resp 16  Ht 1.651 m (5' 5\")  Wt 88.2 kg (194 lb 8 oz)  SpO2 97%  BMI 32.37 kg/m2  General: Alert, oriented, pleasant, NAD  HEENT: Normocephalic, atraumatic, PERRLA, EOMI. Moist mucus membranes, no lesions or thrush  Neck: No cervical or supraclavicular LAD.  Axillary: No LAD  Lungs: CTA bilaterally, normal work of breathing  Cardiac: RRR, S1, S2, no murmurs  Abdomen: Soft, nontender, nondistended. Normoactive bowel sounds. No hepatosplenomegaly, masses  Neuro: CNII-XII grossly intact  Extremities: 2+ " "pedal edema up to mid-thighs b/l, equal     Labs:    10/16/2017 16:54   Sodium 138   Potassium 3.6   Chloride 99   Carbon Dioxide 31   Urea Nitrogen 23   Creatinine 0.84   GFR Estimate 70   GFR Estimate If Black 85   Calcium 7.2 (L)   Anion Gap 8   Phosphorus 2.7   Albumin 2.9 (L)   Protein Total 5.8 (L)   Bilirubin Total 0.6   Alkaline Phosphatase 45   ALT 29   AST 7   Lactate Dehydrogenase 194   Uric Acid 3.3   Glucose 119 (H)   WBC 0.5 (LL)   Hemoglobin 10.5 (L)   Hematocrit 34.8 (L)   Platelet Count 133 (L)   RBC Count 4.29   MCV 81   MCH 24.5 (L)   MCHC 30.2 (L)   RDW 14.9   Diff Method Manual Differential   % Neutrophils 23.9   % Lymphocytes 69.4   % Monocytes 1.7   % Eosinophils 5.0   % Basophils 0.0   Absolute Neutrophil 0.1 (LL)   Absolute Lymphocytes 0.3 (L)   Absolute Monocytes 0.0   Absolute Eosinophils 0.0   Absolute Basophils 0.0   Poikilocytosis Slight   ABO O   RH(D) Pos   Antibody Screen Neg   Test Valid Only At [facility].   Specimen Expires 10/19/2017   Blood Component Type Red Blood Cells L...   Unit Number [id number]   Division Number [id number]   Status of Unit No longer availab...   Crossmatch Red Blood Cells   Unit Status RET       Assessment & Plan:     1. DLBCL, \"double-hit\"-like: S/p 1 cycle of DA-R-EPOCH. She tolerated chemotherapy overall fairly well with some mucositis last week and mild infusion reaction to continuous infusion of chemotherapy. This responded well to e-meds and was able to be resumed without difficulty. No evidence of TLS on labs, remain on allopurinol for 1 month. She is at her toro today and ANC is 100 so will likely not dose escalate with cycle 2 per dosing parameters. She will be due for admission to cycle 2 on 10/27. Plan for restaging following 3 cycles per Dr. Sauceda. She did have some questions regarding prognosis for cure, I discussed with Dr. Sauceda who will clarify with her. Weekly follow-up for now.     2. HEME: Hgb 10.5, platelets 133K, WBC " 0.5/ANC 0.1. Transfuse if Hgb <8 and platelets <10K. Needs twice weekly count checks and tentative transfusions. No transfusion needs today however I did sign blood consent with her.     3. ID: No active concerns. Reviewed neutropenic precautions and stressed importance of taking  mg BID, levaquin 250 mg daily, and fluconazole 200 mg daily.     4. Chest tightness/sensation of SOB: From lymphoma. Okay to resume dex 4 mg BID now that prednisone is completed. Will hopefully be able to taper soon.     5. Mucositis: Start salt and soda rinses 2-3x daily. MMW prn.     6. Peripheral edema: Likely secondary to high doses of steroids from several days and some third spacing of IVFs. New Rx for compression stockings. Elevate legs as able.     7. Chronic pain: Palliative care will still prescribe chronic pain meds.     Greater than 40 minutes was spent with this patient with greater than 20 minutes spent in counseling and coordination of care.    Rashida Jaffe PA-C    [facility]  [address] [place] [address] [id number] [phone]                              COTY Jessica

## 2017-10-17 DIAGNOSIS — R07.89 CHEST WALL PAIN: ICD-10-CM

## 2017-10-17 LAB
BLD PROD TYP BPU: NORMAL
BLD PROD TYP BPU: NORMAL
BLD UNIT ID BPU: 0
BLD UNIT ID BPU: 0
BLOOD PRODUCT CODE: NORMAL
BLOOD PRODUCT CODE: NORMAL
BPU ID: NORMAL
BPU ID: NORMAL
TRANSFUSION STATUS PATIENT QL: NORMAL

## 2017-10-18 RX ORDER — CELECOXIB 200 MG/1
CAPSULE ORAL
Qty: 56 CAPSULE | Refills: 0 | Status: ON HOLD | OUTPATIENT
Start: 2017-10-18 | End: 2018-01-22

## 2017-10-18 NOTE — PROGRESS NOTES
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 She presented in 8/2017 with dramatically worse chest and back pain. CT 9/1/17 showed lytic lesion right 10th rib extending to right T9-10 neural foramen with vertebral body invasion. There was associated conglomerate of lymphadenopathy around the mid T-spine. She had a CT guided biopsy showing B-cell high grade lymphoma. Pathology showed \" The morphology shows a population of large cells, diffuse lymphoid infiltrate. The cells are atypical with abundant mitotic and apoptotic activity and single cell necrosis. Tumor is CD45 and CD79a positive and focally weakly CD30 positive. The other stains revealed positivity for CD20, BCL6, BCL2 and MUM1, and CD10 and CD21 negative. The CD5 and CD3 highlighted background T-cells.  MYC expression was equivocal with approximately 40% nuclei staining.  Ki-67 proliferative index is greater than 90-95%. The immunohistochemistry is suggestive of non-GCB immunophenotype of diffuse large B-cell lymphoma. The cytogenetics did not show rearrangement of the BCL2 or c-MYC.  There is the presence of BCL6 rearrangement in 78% of cells and gains of MYC and BCL2, but no amplifications. \"    Staging LP and BMBx were negative for lymphoma. She started DA-REPOCH 10/6/17. She did well with chemo other than rigors during CIVI. D/c 10/11/17. Given Neulasta 10/12/17. Presents today in hospital follow-up.     Interval History: Elvin presents alone today and is overall feeling well. She had some mucositis end of last week which she sent a [employer] message about and was sent MMW. She has been using this " with some relief. Has not been using salt and soda yet. Had some mild nausea but finding relief with antiemetics. Bowel movements have been overall regular. Her family set up her take home meds and she is somehow all out of prednisone, dex, allopurinol, ppx meds. She is not clear how much steroids she was on but high doses. She had difficulty sleeping due to this. Now has been off steroids since yesterday or day prior and feels chest tightness and sensation of SOB like she did upon presentation. No cough, fevers/chills, GERD, difficulties with bladder. She notes increase in peripheral edema since returning home. No pain or redness in legs. Needs new rx for compression stockings. Has questions regarding treatment and overall plan. She has been working. ROS otherwise negative.     Current Outpatient Prescriptions   Medication     dexamethasone (DECADRON) 4 MG tablet     allopurinol (ZYLOPRIM) 300 MG tablet     fluconazole (DIFLUCAN) 200 MG tablet     acyclovir (ZOVIRAX) 400 MG tablet     levofloxacin (LEVAQUIN) 250 MG tablet     order for DME     magic mouthwash suspension (diphenhydrAMINE, lidocaine, aluminum-magnesium & simethicone)     oxyCODONE (ROXICODONE) 30 MG IR tablet     cetirizine (ZYRTEC ALLERGY) 10 MG tablet     diphenhydrAMINE (BENADRYL) 50 MG capsule     cyclobenzaprine (FLEXERIL) 10 MG tablet     methocarbamol (ROBAXIN) 750 MG tablet     ondansetron (ZOFRAN-ODT) 8 MG ODT tab     COMPOUND CONTAINING CONTROLLED SUBSTANCE (CMPD RX) - PHARMACY TO MIX COMPOUNDED MEDICATION     morphine (MS CONTIN) 30 MG 12 hr tablet     lidocaine-prilocaine (EMLA) cream     diazepam (VALIUM) 5 MG tablet     levothyroxine (SYNTHROID/LEVOTHROID) 112 MCG tablet     hydrochlorothiazide (MICROZIDE) 12.5 MG capsule     SUMAtriptan (IMITREX) 50 MG tablet     celecoxib (CELEBREX) 200 MG capsule     montelukast (SINGULAIR) 10 MG tablet     chlorhexidine (PERIDEX) 0.12 % solution     EPINEPHrine (EPIPEN 2-CARIDAD) 0.3 MG/0.3ML injection  "    potassium chloride SA (POTASSIUM CHLORIDE) 20 MEQ CR tablet     VENTOLIN  (90 BASE) MCG/ACT Inhaler     cromolyn (OPTICROM) 4 % ophthalmic solution     NASALCROM 5.2 MG/ACT AERS Inhaler     Cholecalciferol (VITAMIN D3) 2000 UNITS CAPS     lidocaine (XYLOCAINE) 5 % ointment     ranitidine (ZANTAC) 75 MG tablet     Lidocaine HCl Monohydrate POWD     calcitRIOL (ROCALTROL) 0.25 MCG capsule     aluminum chloride (DRYSOL) 20 % external solution     tacrolimus (PROTOPIC) 0.1 % ointment     triamcinolone (KENALOG) 0.025 % ointment     diclofenac (VOLTAREN) 1 % GEL     mometasone (ASMANEX 30 METERED DOSES) 110 MCG/INH inhaler     MAGNESIUM CITRATE PO     calcium citrate-vitamin D (CALCIUM CITRATE +D) 315-250 MG-UNIT TABS     UNABLE TO FIND     Triamcinolone Acetonide (AZMACORT IN)     UNABLE TO FIND     polyethylene glycol (MIRALAX) powder     docusate sodium 100 MG tablet     order for DME     order for DME     tamoxifen (NOLVADEX) 20 MG tablet     UNABLE TO FIND     UNABLE TO FIND     calcium carbonate (TUMS) 500 MG chewable tablet     UNABLE TO FIND     Omega-3 Fatty Acids (OMEGA 3 PO)     Probiotic Product (PROBIOTIC DAILY PO)     No current facility-administered medications for this visit.        PHYSICAL EXAM:  /79 (BP Location: Left arm, Cuff Size: Adult Regular)  Pulse 115  Temp 98.3  F (36.8  C) (Oral)  Resp 16  Ht 1.651 m (5' 5\")  Wt 88.2 kg (194 lb 8 oz)  SpO2 97%  BMI 32.37 kg/m2  General: Alert, oriented, pleasant, NAD  HEENT: Normocephalic, atraumatic, PERRLA, EOMI. Moist mucus membranes, no lesions or thrush  Neck: No cervical or supraclavicular LAD.  Axillary: No LAD  Lungs: CTA bilaterally, normal work of breathing  Cardiac: RRR, S1, S2, no murmurs  Abdomen: Soft, nontender, nondistended. Normoactive bowel sounds. No hepatosplenomegaly, masses  Neuro: CNII-XII grossly intact  Extremities: 2+ pedal edema up to mid-thighs b/l, equal     Labs:    10/16/2017 16:54   Sodium 138 " "  Potassium 3.6   Chloride 99   Carbon Dioxide 31   Urea Nitrogen 23   Creatinine 0.84   GFR Estimate 70   GFR Estimate If Black 85   Calcium 7.2 (L)   Anion Gap 8   Phosphorus 2.7   Albumin 2.9 (L)   Protein Total 5.8 (L)   Bilirubin Total 0.6   Alkaline Phosphatase 45   ALT 29   AST 7   Lactate Dehydrogenase 194   Uric Acid 3.3   Glucose 119 (H)   WBC 0.5 (LL)   Hemoglobin 10.5 (L)   Hematocrit 34.8 (L)   Platelet Count 133 (L)   RBC Count 4.29   MCV 81   MCH 24.5 (L)   MCHC 30.2 (L)   RDW 14.9   Diff Method Manual Differential   % Neutrophils 23.9   % Lymphocytes 69.4   % Monocytes 1.7   % Eosinophils 5.0   % Basophils 0.0   Absolute Neutrophil 0.1 (LL)   Absolute Lymphocytes 0.3 (L)   Absolute Monocytes 0.0   Absolute Eosinophils 0.0   Absolute Basophils 0.0   Poikilocytosis Slight   ABO O   RH(D) Pos   Antibody Screen Neg   Test Valid Only At [facility]   Specimen Expires 10/19/2017   Blood Component Type Red Blood Cells L...   Unit Number [id number]   Division Number [id number]   Status of Unit No longer availab...   Crossmatch Red Blood Cells   Unit Status RET       Assessment & Plan:     1. DLBCL, \"double-hit\"-like: S/p 1 cycle of DA-R-EPOCH. She tolerated chemotherapy overall fairly well with some mucositis last week and mild infusion reaction to continuous infusion of chemotherapy. This responded well to e-meds and was able to be resumed without difficulty. No evidence of TLS on labs, remain on allopurinol for 1 month. She is at her toro today and ANC is 100 so will likely not dose escalate with cycle 2 per dosing parameters. She will be due for admission to cycle 2 on 10/27. Plan for restaging following 3 cycles per Dr. Sauceda. She did have some questions regarding prognosis for cure, I discussed with Dr. Sauceda who will clarify with her. Weekly follow-up for now.     2. HEME: Hgb 10.5, platelets 133K, WBC 0.5/ANC 0.1. Transfuse if Hgb <8 and platelets <10K. Needs twice weekly count checks and " tentative transfusions. No transfusion needs today however I did sign blood consent with her.     3. ID: No active concerns. Reviewed neutropenic precautions and stressed importance of taking  mg BID, levaquin 250 mg daily, and fluconazole 200 mg daily.     4. Chest tightness/sensation of SOB: From lymphoma. Okay to resume dex 4 mg BID now that prednisone is completed. Will hopefully be able to taper soon.     5. Mucositis: Start salt and soda rinses 2-3x daily. MMW prn.     6. Peripheral edema: Likely secondary to high doses of steroids from several days and some third spacing of IVFs. New Rx for compression stockings. Elevate legs as able.     7. Chronic pain: Palliative care will still prescribe chronic pain meds.     Greater than 40 minutes was spent with this patient with greater than 20 minutes spent in counseling and coordination of care.    Rashida Jaffe PA-C    [facility]  [address] [place] [id number]  [phone]

## 2017-10-19 ENCOUNTER — INFUSION THERAPY VISIT (OUTPATIENT)
Dept: ONCOLOGY | Facility: CLINIC | Age: 57
End: 2017-10-19
Attending: INTERNAL MEDICINE
Payer: COMMERCIAL

## 2017-10-19 ENCOUNTER — MYC MEDICAL ADVICE (OUTPATIENT)
Dept: ONCOLOGY | Facility: CLINIC | Age: 57
End: 2017-10-19

## 2017-10-19 ENCOUNTER — APPOINTMENT (OUTPATIENT)
Dept: LAB | Facility: CLINIC | Age: 57
End: 2017-10-19
Attending: INTERNAL MEDICINE
Payer: COMMERCIAL

## 2017-10-19 VITALS
RESPIRATION RATE: 16 BRPM | OXYGEN SATURATION: 97 % | DIASTOLIC BLOOD PRESSURE: 85 MMHG | BODY MASS INDEX: 31.95 KG/M2 | HEART RATE: 123 BPM | TEMPERATURE: 97.6 F | SYSTOLIC BLOOD PRESSURE: 132 MMHG | WEIGHT: 192 LBS

## 2017-10-19 DIAGNOSIS — C85.10 HIGH GRADE B-CELL LYMPHOMA (H): Primary | ICD-10-CM

## 2017-10-19 DIAGNOSIS — C83.33 DIFFUSE LARGE B-CELL LYMPHOMA OF INTRA-ABDOMINAL LYMPH NODES (H): ICD-10-CM

## 2017-10-19 LAB
ANION GAP SERPL CALCULATED.3IONS-SCNC: 12 MMOL/L (ref 3–14)
BASOPHILS # BLD AUTO: 0 10E9/L (ref 0–0.2)
BASOPHILS NFR BLD AUTO: 0 %
BLD PROD TYP BPU: NORMAL
BLD UNIT ID BPU: 0
BLOOD PRODUCT CODE: NORMAL
BPU ID: NORMAL
BUN SERPL-MCNC: 33 MG/DL (ref 7–30)
CALCIUM SERPL-MCNC: 7.4 MG/DL (ref 8.5–10.1)
CHLORIDE SERPL-SCNC: 96 MMOL/L (ref 94–109)
CO2 SERPL-SCNC: 27 MMOL/L (ref 20–32)
CREAT SERPL-MCNC: 0.87 MG/DL (ref 0.52–1.04)
DIFFERENTIAL METHOD BLD: ABNORMAL
EOSINOPHIL # BLD AUTO: 0 10E9/L (ref 0–0.7)
EOSINOPHIL NFR BLD AUTO: 0 %
ERYTHROCYTE [DISTWIDTH] IN BLOOD BY AUTOMATED COUNT: 14.9 % (ref 10–15)
GFR SERPL CREATININE-BSD FRML MDRD: 67 ML/MIN/1.7M2
GLUCOSE SERPL-MCNC: 130 MG/DL (ref 70–99)
HCT VFR BLD AUTO: 34.3 % (ref 35–47)
HGB BLD-MCNC: 10.6 G/DL (ref 11.7–15.7)
LYMPHOCYTES # BLD AUTO: 1 10E9/L (ref 0.8–5.3)
LYMPHOCYTES NFR BLD AUTO: 19.5 %
MCH RBC QN AUTO: 24.6 PG (ref 26.5–33)
MCHC RBC AUTO-ENTMCNC: 30.9 G/DL (ref 31.5–36.5)
MCV RBC AUTO: 80 FL (ref 78–100)
METAMYELOCYTES # BLD: 0 10E9/L
METAMYELOCYTES NFR BLD MANUAL: 0.9 %
MONOCYTES # BLD AUTO: 0.4 10E9/L (ref 0–1.3)
MONOCYTES NFR BLD AUTO: 8.3 %
NEUTROPHILS # BLD AUTO: 3.7 10E9/L (ref 1.6–8.3)
NEUTROPHILS NFR BLD AUTO: 71.3 %
NRBC # BLD AUTO: 0.4 10*3/UL
NRBC BLD AUTO-RTO: 8 /100
OVALOCYTES BLD QL SMEAR: SLIGHT
PLATELET # BLD AUTO: 81 10E9/L (ref 150–450)
POTASSIUM SERPL-SCNC: 3.7 MMOL/L (ref 3.4–5.3)
RBC # BLD AUTO: 4.31 10E12/L (ref 3.8–5.2)
SODIUM SERPL-SCNC: 135 MMOL/L (ref 133–144)
TRANSFUSION STATUS PATIENT QL: NORMAL
WBC # BLD AUTO: 5.2 10E9/L (ref 4–11)

## 2017-10-19 PROCEDURE — 99212 OFFICE O/P EST SF 10 MIN: CPT

## 2017-10-19 PROCEDURE — 80048 BASIC METABOLIC PNL TOTAL CA: CPT | Performed by: NURSE PRACTITIONER

## 2017-10-19 PROCEDURE — 36591 DRAW BLOOD OFF VENOUS DEVICE: CPT

## 2017-10-19 PROCEDURE — 25000128 H RX IP 250 OP 636: Mod: ZF | Performed by: INTERNAL MEDICINE

## 2017-10-19 PROCEDURE — 85025 COMPLETE CBC W/AUTO DIFF WBC: CPT | Performed by: NURSE PRACTITIONER

## 2017-10-19 RX ORDER — HEPARIN SODIUM (PORCINE) LOCK FLUSH IV SOLN 100 UNIT/ML 100 UNIT/ML
5 SOLUTION INTRAVENOUS
Status: COMPLETED | OUTPATIENT
Start: 2017-10-19 | End: 2017-10-19

## 2017-10-19 RX ADMIN — SODIUM CHLORIDE, PRESERVATIVE FREE 5 ML: 5 INJECTION INTRAVENOUS at 06:38

## 2017-10-19 ASSESSMENT — PAIN SCALES - GENERAL: PAINLEVEL: EXTREME PAIN (9)

## 2017-10-19 NOTE — PATIENT INSTRUCTIONS
[facility] [place] Main Line: [phone]    Call triage nurse with chills and/or temperature greater than or equal to 100.4, uncontrolled nausea/vomiting, diarrhea, constipation, dizziness, shortness of breath, chest pain, bleeding, unexplained bruising, or any new/concerning symptoms, questions/concerns.   If you are having any concerning symptoms or wish to speak to a provider before your next infusion visit, please call your care coordinator or triage to notify them so we can adequately serve you.   Triage Nurse Line: [phone]    If after hours, weekends, or holidays, call main hospital  and ask for Oncology doctor on call @ [phone]               October 2017 Sunday Monday Tuesday Wednesday Thursday Friday Saturday   1     2     [facility] LAB DRAW    7:30 AM   (15 min.)    [facility] LAB DRAW   [facility] Lab Draw     XR LP SPINAL TAP DIAGNOSTIC    7:45 AM   (30 min.)   [id number]   [facility] Xray     LAB    9:15 AM   (15 min.)    LAB   [facility] Lab 3     Outpatient Visit    9:26 AM   [facility] [place]     UMP BMT BONE MARROW BIOPSY   11:00 AM   (90 min.)    ASC BONE MARROW BIOPSY SANJANA   [facility] Blood and Marrow Transplant     BIOPSY BONE MARROW   11:00 AM   Amelia Watkins PA-C    OR 4     Outpatient Visit    6:19 AM   [facility] [place]     IR CHEST PORT PLACEMENT >5 YRS    6:30 AM   (75 min.)   [id number]   [facility] ASC Imaging     INSERT PORT VASCULAR ACCESS    8:00 AM   Jc Goodman PA-C   [facility] OR     MR MYOCARDIUM W    1:30 PM   (90 min.)   [id number]   [facility] [place], MRI 5     6     Admission    8:46 AM   Preeti Guzman MD   Unit 7D [facility]   (Discharge: 10/11/2017) 7       8     9     XR LP INTRATHECAL CHEMO ADMIN   10:35 AM   (60 min.)   [id number]   [facility],  Radiology 10     11     12     [facility] INJECTION    4:15 PM   (30 min.)   Nurse, [facility] Injection   [facility]  13     14       15     16     [facility] DRAW    4:45 PM   (15 min.)    [facility] LAB DRAW   [facility] Lab Draw     UMP RETURN    5:05 PM   (50 min.)   Rashida Jaffe PA   [facility] 17     18     19     [facility] LAB DRAW    6:30 AM   (15 min.)    [facility] LAB DRAW   [facility] Lab Draw     UMP ONC INFUSION 180    7:00 AM   (180 min.)   [facility] ONCOLOGY INFUSION   [facility] 20     21       22     23     UMP RETURN    2:35 PM   (50 min.)   Rashida Jaffe PA   [facility] 24     25     26     27     [facility] DRAW    6:30 AM   (15 min.)    [facility] LAB DRAW   [facility] Lab Draw     UMP RETURN    6:45 AM   (50 min.)   Jessica Cox PA-C   [facility] 28       29     30     31 November 2017 Sunday Monday Tuesday Wednesday Thursday Friday Saturday                  1     2     US HEAD NECK SOFT TISSUE    5:00 PM   (60 min.)   [id number]   [facility] US     LAB    6:00 PM   (15 min.)    LAB   [facility] Lab 3     4       5     6     7     8     9     10     11       12     13     UMP RETURN ENDOCRINE    4:15 PM   (30 min.)   Avelina Castro MD   [facility] Endocrinology 14     15     16     17     18       19     20     21     22     23     24     25       26     27     UMP RETURN    3:45 PM   (30 min.)   Shahla Crawley MD   [facility] 28     29     30                            Lab Results:  Recent Results (from the past 12 hour(s))   CBC with platelets differential    Collection Time: 10/19/17  6:47 AM   Result Value Ref Range    WBC 5.2 4.0 - 11.0 10e9/L    RBC Count 4.31 3.8 - 5.2 10e12/L    Hemoglobin 10.6 (L) 11.7 - 15.7 g/dL    Hematocrit 34.3 (L) 35.0 - 47.0 %    MCV 80 78 - 100 fl    MCH 24.6 (L) 26.5 - 33.0 pg    MCHC 30.9 (L) 31.5 - 36.5 g/dL    RDW 14.9 10.0 - 15.0 %    Platelet Count 81 (L) 150 - 450 10e9/L     Diff Method Manual Differential     % Neutrophils 71.3 %    % Lymphocytes 19.5 %    % Monocytes 8.3 %    % Eosinophils 0.0 %    % Basophils 0.0 %    % Metamyelocytes 0.9 %    Nucleated RBCs 8 (H) 0 /100    Absolute Neutrophil 3.7 1.6 - 8.3 10e9/L    Absolute Lymphocytes 1.0 0.8 - 5.3 10e9/L    Absolute Monocytes 0.4 0.0 - 1.3 10e9/L    Absolute Eosinophils 0.0 0.0 - 0.7 10e9/L    Absolute Basophils 0.0 0.0 - 0.2 10e9/L    Absolute Metamyelocytes 0.0 0 10e9/L    Absolute Nucleated RBC 0.4     Ovalocytes Slight    Basic metabolic panel    Collection Time: 10/19/17  6:47 AM   Result Value Ref Range    Sodium 135 133 - 144 mmol/L    Potassium 3.7 3.4 - 5.3 mmol/L    Chloride 96 94 - 109 mmol/L    Carbon Dioxide 27 20 - 32 mmol/L    Anion Gap 12 3 - 14 mmol/L    Glucose 130 (H) 70 - 99 mg/dL    Urea Nitrogen 33 (H) 7 - 30 mg/dL    Creatinine 0.87 0.52 - 1.04 mg/dL    GFR Estimate 67 >60 mL/min/1.7m2    GFR Estimate If Black 81 >60 mL/min/1.7m2    Calcium 7.4 (L) 8.5 - 10.1 mg/dL

## 2017-10-19 NOTE — MR AVS SNAPSHOT
After Visit Summary   10/19/2017    Tisha Arias    MRN: [id number]           Patient Information     Date Of Birth          1960        Visit Information        Provider Department      10/19/2017 7:00 AM  12 ATC;  ONCOLOGY INFUSION [facility]        Today's Diagnoses     High grade B-cell lymphoma (H)    -  1    Diffuse large B-cell lymphoma of intra-abdominal lymph nodes (H)          Care Instructions    [facility] Main Line: [phone]    Call triage nurse with chills and/or temperature greater than or equal to 100.4, uncontrolled nausea/vomiting, diarrhea, constipation, dizziness, shortness of breath, chest pain, bleeding, unexplained bruising, or any new/concerning symptoms, questions/concerns.   If you are having any concerning symptoms or wish to speak to a provider before your next infusion visit, please call your care coordinator or triage to notify them so we can adequately serve you.   Triage Nurse Line: [phone]    If after hours, weekends, or holidays, call main hospital  and ask for Oncology doctor on call @ [phone]               October 2017 Sunday Monday Tuesday Wednesday Thursday Friday Saturday   1     2     [facility] LAB DRAW    7:30 AM   (15 min.)    [facility]   [facility] Lab Draw     XR LP SPINAL TAP DIAGNOSTIC    7:45 AM   (30 min.)   [id number]   [facility] Center Xray     LAB    9:15 AM   (15 min.)    LAB   [facility] Lab 3     Outpatient Visit    9:26 AM   [facility] Procedure [place]     [facility] BMT BONE MARROW BIOPSY   11:00 AM   (90 min.)    ASC BONE MARROW BIOPSY SANJANA   [facility] Blood and Marrow Transplant     BIOPSY BONE MARROW   11:00 AM   Amelia Watkins PA-C    OR 4     Outpatient Visit    6:19 AM   [facility] [place]     IR CHEST PORT PLACEMENT >5 YRS    6:30 AM   (75 min.)   [id number]   [facility] ASC Imaging     INSERT PORT VASCULAR ACCESS    8:00  AM   Jc Goodman PA-C   [facility] OR     MR MYOCARDIUM W    1:30 PM   (90 min.)   [id number]   [facility] [place], MRI 5     6     Admission    8:46 AM   Preeti Guzman MD   [address] [facility]   (Discharge: 10/11/2017) 7       8     9     XR LP INTRATHECAL CHEMO ADMIN   10:35 AM   (60 min.)   [id number]   [facility] [place],  Radiology 10     11     12     [facility] INJECTION    4:15 PM   (30 min.)   Nurse,  Oncology Injection   [facility] 13     14       15     16     [facility] LAB DRAW    4:45 PM   (15 min.)    [facility] LAB DRAW   [facility] Lab Draw     UMP RETURN    5:05 PM   (50 min.)   Rashida Jaffe PA   [facility] 17     18     19     [facility] LAB DRAW    6:30 AM   (15 min.)   [facility] LAB DRAW   [facility] Lab Draw     UMP ONC INFUSION 180    7:00 AM   (180 min.)    ONCOLOGY INFUSION   [facility] 20     21       22     23     UMP RETURN    2:35 PM   (50 min.)   Rashida Jaffe PA   [facility] 24     25     26     27     [facility] LAB DRAW    6:30 AM   (15 min.)    [facility] LAB DRAW   [facility] Lab Draw     UMP RETURN    6:45 AM   (50 min.)   Jessica Cox PA-C   [facility] 28 29     30     31 November 2017 Sunday Monday Tuesday Wednesday Thursday Friday Saturday                  1     2     US HEAD NECK SOFT TISSUE    5:00 PM   (60 min.)   [email or web address]   [facility] US     LAB    6:00 PM   (15 min.)    LAB   [facility] Lab 3     4       5     6     7     8     9     10     11       12     13     UMP RETURN ENDOCRINE    4:15 PM   (30 min.)   Avelina Castro MD   [facility] Endocrinology 14     15     16     17     18       19     20     21     22     23     24     25       26     27     UMP RETURN    3:45 PM   (30 min.)   Shahla Crawley MD   [facility] 28     29     30                             Lab Results:  Recent Results (from the past 12 hour(s))   CBC with platelets differential    Collection Time: 10/19/17  6:47 AM   Result Value Ref Range    WBC 5.2 4.0 - 11.0 10e9/L    RBC Count 4.31 3.8 - 5.2 10e12/L    Hemoglobin 10.6 (L) 11.7 - 15.7 g/dL    Hematocrit 34.3 (L) 35.0 - 47.0 %    MCV 80 78 - 100 fl    MCH 24.6 (L) 26.5 - 33.0 pg    MCHC 30.9 (L) 31.5 - 36.5 g/dL    RDW 14.9 10.0 - 15.0 %    Platelet Count 81 (L) 150 - 450 10e9/L    Diff Method Manual Differential     % Neutrophils 71.3 %    % Lymphocytes 19.5 %    % Monocytes 8.3 %    % Eosinophils 0.0 %    % Basophils 0.0 %    % Metamyelocytes 0.9 %    Nucleated RBCs 8 (H) 0 /100    Absolute Neutrophil 3.7 1.6 - 8.3 10e9/L    Absolute Lymphocytes 1.0 0.8 - 5.3 10e9/L    Absolute Monocytes 0.4 0.0 - 1.3 10e9/L    Absolute Eosinophils 0.0 0.0 - 0.7 10e9/L    Absolute Basophils 0.0 0.0 - 0.2 10e9/L    Absolute Metamyelocytes 0.0 0 10e9/L    Absolute Nucleated RBC 0.4     Ovalocytes Slight    Basic metabolic panel    Collection Time: 10/19/17  6:47 AM   Result Value Ref Range    Sodium 135 133 - 144 mmol/L    Potassium 3.7 3.4 - 5.3 mmol/L    Chloride 96 94 - 109 mmol/L    Carbon Dioxide 27 20 - 32 mmol/L    Anion Gap 12 3 - 14 mmol/L    Glucose 130 (H) 70 - 99 mg/dL    Urea Nitrogen 33 (H) 7 - 30 mg/dL    Creatinine 0.87 0.52 - 1.04 mg/dL    GFR Estimate 67 >60 mL/min/1.7m2    GFR Estimate If Black 81 >60 mL/min/1.7m2    Calcium 7.4 (L) 8.5 - 10.1 mg/dL             Follow-ups after your visit        Your next 10 appointments already scheduled     Oct 23, 2017  2:50 PM CDT   (Arrive by 2:35 PM)   Return Visit with COTY Jessica   [facility] ([facility])    [address] [place] [address]  2nd Floor  [facility] [id number] [phone]            Oct 27, 2017  6:30 AM CDT   [facility] Lab Draw with  [facility] LAB DRAW   [facility] ([facility] [place])    [address] [place] [address]  [address] [address]   [phone]            Oct 27, 2017  7:00 AM CDT   (Arrive by 6:45 AM)   Return Visit with Jessica Cox PA-C   [facility] ([facility])    [address] [place] [id number]   [phone]            Nov 02, 2017  5:00 PM CDT   US HEAD NECK SOFT TISSUE with UCUS3   [facility] US ([facility])    [address] [place] [id number] [phone]           Please bring a list of your medicines (including vitamins, minerals and over-the-counter drugs). Also, tell your doctor about any allergies you may have. Wear comfortable clothes and leave your valuables at home.  You do not need to do anything special to prepare for your exam.  Please call the Imaging Department at your exam site with any questions.            Nov 02, 2017  6:00 PM CDT   LAB with  LAB   [facility] Lab ([facility])    [address] [place] [id number] [phone]           Patient must bring picture ID. Patient should be prepared to give a urine specimen  Please do not eat 10-12 hours before your appointment if you are coming in fasting for labs on lipids, cholesterol, or glucose (sugar). Pregnant women should follow their Care Team instructions. Water with medications is okay. Do not drink coffee or other fluids. If you have concerns about taking  your medications, please ask at office or if scheduling via [employer], send a message by clicking on Secure Messaging, Message Your Care Team.            Nov 13, 2017  4:30 PM CST   (Arrive by 4:15 PM)   RETURN ENDOCRINE with Avelina Castro MD   [facility] Endocrinology ([facility])    [address] [place] [address]  3rd [facility] [id number] [phone]            Nov 27, 2017  4:00 PM CST   (Arrive by 3:45 PM)   Return Visit with Shahla  Raul Crawley MD   [facility] ([facility] [place])    [address] [facility]  2nd Floor  [facility] [address] [phone]              Future tests that were ordered for you today     Open Standing Orders        Priority Remaining Interval Expires Ordered    Red blood cell prepare order unit Routine 99/100 CONDITIONAL (SPECIFY) BLOOD  10/18/2017    Platelets prepare order unit Routine 99/100 CONDITIONAL (SPECIFY) BLOOD  10/18/2017            Who to contact     If you have questions or need follow up information about today's clinic visit or your schedule please contact [facility] directly at [phone].  Normal or non-critical lab and imaging results will be communicated to you by [employer], letter or phone within 4 business days after the clinic has received the results. If you do not hear from us within 7 days, please contact the clinic through [employer] or phone. If you have a critical or abnormal lab result, we will notify you by phone as soon as possible.  Submit refill requests through [employer] or call your pharmacy and they will forward the refill request to us. Please allow 3 business days for your refill to be completed.          Additional Information About Your Visit        [employer] Information     [employer] gives you secure access to your electronic health record. If you see a primary care provider, you can also send messages to your care team and make appointments. If you have questions, please call your primary care clinic.  If you do not have a primary care provider, please call [phone] and they will assist you.        Care EveryWhere ID     This is your Care EveryWhere ID. This could be used by other organizations to access your [facility] medical records  [id number]        Your Vitals Were     Pulse Temperature Respirations Pulse Oximetry BMI (Body Mass Index)       123 97.6  F (36.4  C) 16 97% 31.95 kg/m2        Blood Pressure from Last  3 Encounters:   10/19/17 132/85   10/16/17 121/79   10/11/17 134/82    Weight from Last 3 Encounters:   10/19/17 87.1 kg (192 lb)   10/16/17 88.2 kg (194 lb 8 oz)   10/11/17 87.9 kg (193 lb 12.6 oz)              We Performed the Following     Basic metabolic panel     Blood component     CBC with platelets differential     Platelets prepare order unit          Today's Medication Changes          These changes are accurate as of: 10/19/17  7:35 AM.  If you have any questions, ask your nurse or doctor.               These medicines have changed or have updated prescriptions.        Dose/Directions    diazepam 5 MG tablet   Commonly known as:  VALIUM   This may have changed:    - when to take this  - additional instructions   Used for:  Chest wall pain        Dose:  5 mg   Take 1 tablet (5 mg) by mouth 3 times daily as needed For muscle spasms   Quantity:  90 tablet   Refills:  2                Primary Care Provider Office Phone # Fax #    Shahla Raul Crawley [phone]       [address] [place] [address]        Equal Access to Services     [facility]: Hadii sacha marley hadasho Soesteban, waaxda luqadaha, qaybta kaalmada adechloé, ranjan yoder . So [facility] [phone].    ATENCIÓN: Si habla español, tiene a stiles disposición servicios gratuitos de asistencia lingüística. Llame al [phone].    We comply with applicable federal civil rights laws and [place] laws. We do not discriminate on the basis of race, color, national origin, age, disability, sex, sexual orientation, or gender identity.            Thank you!     Thank you for choosing [facility]  for your care. Our goal is always to provide you with excellent care. Hearing back from our patients is one way we can continue to improve our services. Please take a few minutes to complete the written survey that you may receive in the mail after your visit with us. Thank you!             Your  Updated Medication List - Protect others around you: Learn how to safely use, store and throw away your medicines at www.disposemymeds.org.          This list is accurate as of: 10/19/17  7:35 AM.  Always use your most recent med list.                   Brand Name Dispense Instructions for use Diagnosis    acyclovir 400 MG tablet    ZOVIRAX    30 tablet    Take 1 tablet (400 mg) by mouth 2 times daily    High grade B-cell lymphoma (H)       allopurinol 300 MG tablet    ZYLOPRIM    30 tablet    Take 1 tablet (300 mg) by mouth daily    High grade B-cell lymphoma (H)       aluminum chloride 20 % external solution    DRYSOL    60 mL    Apply topically At Bedtime    Rash, Intertrigo       AZMACORT IN      Inhale 2 puffs into the lungs as needed        calcitRIOL 0.25 MCG capsule    ROCALTROL    270 capsule    2 tabs in am and 1 tab qhs    Postsurgical hypothyroidism, Papillary carcinoma, follicular variant (H), Metastasis to cervical lymph node (H)       CALCIUM CITRATE +D 315-250 MG-UNIT Tabs per tablet   Generic drug:  calcium citrate-vitamin D     120 tablet    Take 2 tablets by mouth 3 times daily        celecoxib 200 MG capsule    celeBREX    56 capsule    TAKE ONE CAPSULE BY MOUTH TWICE DAILY    Chest wall pain       cetirizine 10 MG tablet    ZYRTEC ALLERGY    30 tablet    Take 1 tablet (10 mg) by mouth 3 times daily On hold for lab test.    High grade B-cell lymphoma (H)       chlorhexidine 0.12 % solution    PERIDEX          COMPOUND CONTAINING CONTROLLED SUBSTANCE - PHARMACY TO MIX COMPOUNDED MEDICATION    CMPD RX    120 g    Apply small amount to affected area two times daily.    Neoplasm related pain       cromolyn 4 % ophthalmic solution    OPTICROM    10 mL    Place 1 drop into both eyes 4 times daily    Idiopathic mast cell activation syndrome (H)       cyclobenzaprine 10 MG tablet    FLEXERIL    42 tablet    Take 1 tablet (10 mg) by mouth 3 times daily as needed On hold Dr Monsalve    High grade B-cell  lymphoma (H)       * dexamethasone 4 MG tablet    DECADRON    6 tablet    Take 1 tablet (4 mg) by mouth 2 times daily (with meals) for 3 days Or as directed by MD    High grade B-cell lymphoma (H)       * dexamethasone 4 MG tablet    DECADRON    4 tablet    Take 2 tablets (8 mg) by mouth daily for 2 days    High grade B-cell lymphoma (H)       * dexamethasone 4 MG tablet    DECADRON    60 tablet    Take 1 tablet (4 mg) by mouth 2 times daily (with meals) Or as directed by MD    High grade B-cell lymphoma (H)       diazepam 5 MG tablet    VALIUM    90 tablet    Take 1 tablet (5 mg) by mouth 3 times daily as needed For muscle spasms    Chest wall pain       diclofenac 1 % Gel topical gel    VOLTAREN    100 g    Apply affected area two times daily PRN using enclosed dosing card.    Myofascial pain       diphenhydrAMINE 50 MG capsule    BENADRYL    56 capsule    Take 1 capsule (50 mg) by mouth nightly as needed for itching or sleep    High grade B-cell lymphoma (H)       docusate sodium 100 MG tablet    COLACE    60 tablet    Take 100 mg by mouth daily    Acute constipation       EPINEPHrine 0.3 MG/0.3ML injection 2-pack    EPIPEN 2-CARIDAD    0.6 mL    Inject 0.3 mLs (0.3 mg) into the muscle once as needed for anaphylaxis        fluconazole 200 MG tablet    DIFLUCAN    30 tablet    Take 1 tablet (200 mg) by mouth daily    High grade B-cell lymphoma (H)       hydrochlorothiazide 12.5 MG capsule    MICROZIDE    90 capsule    Take 1 capsule (12.5 mg) by mouth daily    Hypocalcemia       levofloxacin 250 MG tablet    LEVAQUIN    30 tablet    Take 1 tablet (250 mg) by mouth daily    High grade B-cell lymphoma (H)       levothyroxine 112 MCG tablet    SYNTHROID/LEVOTHROID    189 tablet    Mon-Sat: (2 tabs daily) Sunday: 3 tabs    Postsurgical hypothyroidism, Papillary carcinoma, follicular variant (H), Postsurgical hypoparathyroidism (H), Thyroid cancer (H)       lidocaine 5 % ointment    XYLOCAINE    35.44 g    SANJANA TOPICALLY  QID PRN    Chest wall pain       Lidocaine HCl Monohydrate Powd           lidocaine visc 2% 2.5mL/5mL & maalox/mylanta w/ simeth 2.5mL/5mL & diphenhydrAMINE 5mg/5mL Susp suspension    [facility]    360 mL    Swish and spit 10 mLs in mouth every 6 hours as needed for mouth sores    DLBCL (diffuse large B cell lymphoma) (H), Stomatitis and mucositis       lidocaine-prilocaine cream    EMLA    30 g    Apply topically as needed (port access pain.)    Neoplasm related pain, Chest wall pain       MAGNESIUM CITRATE PO      Take 400 mg by mouth daily        methocarbamol 750 MG tablet    ROBAXIN    360 tablet    Take 1 tablet (750 mg) by mouth 4 times daily as needed . Ok to take a 5th Robaxin on very severe days.    Myofascial pain       mometasone 110 MCG/INH inhaler    ASMANEX 30 METERED DOSES    3 Inhaler    Inhale 1 puff into the lungs daily    Intermittent asthma, uncomplicated       montelukast 10 MG tablet    SINGULAIR    120 tablet    TAKE TWO TABLETS BY MOUTH TWICE DAILY    Idiopathic mast cell activation syndrome (H)       morphine 30 MG 12 hr tablet    MS CONTIN    120 tablet    Take 1 tablet (30 mg) by mouth every 8 hours . Take an addition pill at night such that your evening dose is 60mg    Neoplasm related pain       NASALCROM 5.2 MG/ACT Aers Inhaler   Generic drug:  cromolyn sodium     1 Bottle    SPRAY ONE SPRAY( 1 ML) IN NOSTRIL DAILY    Mast cell disease, systemic       OMEGA 3 PO      Take 5 mLs by mouth        ondansetron 8 MG ODT tab    ZOFRAN ODT    90 tablet    Take 1 tablet (8 mg) by mouth every 8 hours as needed for nausea or vomiting    Nausea with vomiting       * order for DME     2 Units    Equipment being ordered: compression stockings. 20-30 mm or 30 - 40 mm as patient can tolerate. Physical therapy to determine if they should be above or below the knee.    Venous stasis       * order for DME     2 Device    Equipment being ordered: compression bra    Malignant neoplasm of  right female breast, unspecified site of breast       * order for DME     1 Units    Compression stockings, medium grade, please measure and fit. Please dispense a pair.    Peripheral edema       oxyCODONE 30 MG IR tablet    ROXICODONE    60 tablet    Take 1 tablet (30 mg) by mouth every 4 hours as needed for moderate to severe pain    High grade B-cell lymphoma (H)       polyethylene glycol powder    MIRALAX    510 g    Take 17 g by mouth daily    Acute constipation       potassium chloride SA 20 MEQ CR tablet    KLOR-CON    90 tablet    Take 1 tablet (20 mEq) by mouth daily    Hypokalemia       PROBIOTIC DAILY PO      Take 1 capsule by mouth daily Lacto acid bifidobacterium    Breast cancer, unspecified laterality, Thyroid cancer (H), Chronic arthralgias of knees and hips, unspecified laterality       ranitidine 75 MG tablet    ZANTAC    270 tablet    Take 1 tablet (75 mg) by mouth 3 times daily    Mast cell disease, systemic       SUMAtriptan 50 MG tablet    IMITREX    5 tablet    Take 1 tablet (50 mg) by mouth at onset of headache for migraine (may repeat in 2 hours as needed, max 2 tablets daily)    Migraine without status migrainosus, not intractable, unspecified migraine type       tacrolimus 0.1 % ointment    PROTOPIC    60 g    Apply topically 2 times daily    Rash, Intertrigo       tamoxifen 20 MG tablet    NOLVADEX    90 tablet    Take 1 tablet (20 mg) by mouth daily    Malignant neoplasm of female breast, unspecified laterality, unspecified site of breast       triamcinolone 0.025 % ointment    KENALOG    80 g    Apply topically 2 times daily Mix with 1 lb jar of vaseline or aquaphor    Intertrigo, Rash       TUMS 500 MG chewable tablet   Generic drug:  calcium carbonate     180 chew tab    Take 2 tablets (1,000 mg) by mouth nightly as needed for other (cramps)        * UNABLE TO FIND      1 tablet 3 times daily MEDICATION NAME: calcium D-Glucarate        * UNABLE TO FIND      2 tablets 3 times daily  MEDICATION NAME: Natural D-Hist (on hold during chemo)    Breast cancer, unspecified laterality, Papillary carcinoma, follicular variant (H), Chronic musculoskeletal pain       * UNABLE TO FIND      MEDICATION NAME: Tumeric 3 capsules daily (on hold during chemo)        * UNABLE TO FIND      1 tablet 3 times daily MEDICATION NAME: Digestzymes    Thyroid cancer (H), Postsurgical hypothyroidism, Postsurgical hypoparathyroidism (H)       * UNABLE TO FIND      1 tablet daily MEDICATION NAME: Pure Encapsulations    Thyroid cancer (H), Postsurgical hypothyroidism, Postsurgical hypoparathyroidism (H)       VENTOLIN  (90 BASE) MCG/ACT Inhaler   Generic drug:  albuterol     18 g    INHALE 2 PUFFS INTO THE LUNGS EVERY 4 HOURS AS NEEDED FOR SHORTNESS OF BREATH OR DIFFICULT BREATHING OR WHEEZING    Mild intermittent asthma without complication       vitamin D3 2000 UNITS Caps     60 capsule    Take 10,000 Units by mouth daily    Thyroid cancer (H), Postsurgical hypothyroidism, Papillary carcinoma, follicular variant (H), Postsurgical hypoparathyroidism (H)       * Notice:  This list has 11 medication(s) that are the same as other medications prescribed for you. Read the directions carefully, and ask your doctor or other care provider to review them with you.

## 2017-10-19 NOTE — TELEPHONE ENCOUNTER
RN Care Coordination Note  Reason for Outgoing Call:   See [facility] [place] Message  Additional Patient Questions/Concerns:   Pt wants clarification from Dr. Sauceda re: how much iron she is supposed to be taking stating that she thinks she remembers Dr. Sauceda advising iron 400 mg three times a day , but that she is not sure and is not currently taking it.  Continued LE edema, not improving. Has not gotten compression stockings as prescribed yet and it trying to keep her feet up on a stool at work  Pt voiced desire to move her 10/23 lab/SANJANA appt to 0630 or 0700  Pt voiced dissatisfaction that she has not received the progress notes in the mail that she asked me for  Nursing Action/Patient Instruction:  Notified pt that the planned admission on 10/27 is scheduled for 9 am and that these hospital admissions to not show up on her appt list in [facility], unfortunately.  Reiterated that [facility] tx plan is for 5 days inpatient as with prior cycle, though she was there on a 6th day due to a reaction with 1st cycle  Reiterated importance of elevating LE above level of heart as much as possible, and she will not be at work on Sat/Sunday. Encouraged her to obtain compression stockings as prescribed  Explained to pt that there are currently no SANJANA openings on 10/23 earlier than what she currently has scheduled at 2:50 with Rashida, but that she can certainly call scheduling tomorrow to see if there have been any cancellations if she would like.Explained to pt that the possible blood transfusion appt that we wanted scheduled for her on 10/23 has not been scheduled as of yet due to [facility], [facility], and [place] infusion rooms being booked but that our [facility]  will check again tomorrow and let her know if something can be scheduled.   Message to Dr. Sauceda requesting clarification on iron as per above   Explained to pt that I did mail her the 1 clinic note she requested I mail to her and that I have also replied to her  [employer] message today re: same, which is how I saw her Voice123t message to Jessica Cox questioning the admission plan that I am calling her about now.   Patient Response/Evaluation:   Pt voiced understanding of above instructions and information and denied further questions at the end of our call and thanked me.   Elvin voiced understanding that she will be notified of Dr. Sauceda's answer re: the iron.   Elvin also states that she is ok with receiving the provider notes via [employer] instead of by mail.     Laila Barrett, RN, BSN, OCN  Care Coordinator  [facility]

## 2017-10-19 NOTE — PROGRESS NOTES
Infusion Nursing Note:  Tisha Arias presents today for possible blood products.    Patient seen by provider today: No   present during visit today: Not Applicable.    Note: Reports sweats, said she told provider this on 10/16, no change since then.    She would like referal to genetic counselor for her multiple cancers.  Message sent to Rashida ANSARI for referral as she saw patient on 10/16 and will again on 10/23.    Intravenous Access:  Implanted Port.    Treatment Conditions:  Lab Results   Component Value Date    HGB 10.6 10/19/2017     Lab Results   Component Value Date    WBC 5.2 10/19/2017      Lab Results   Component Value Date    ANEU 3.7 10/19/2017     Lab Results   Component Value Date    PLT 81 10/19/2017      Last Basic Metabolic Panel:  Lab Results   Component Value Date     10/19/2017      Lab Results   Component Value Date    POTASSIUM 3.7 10/19/2017     Lab Results   Component Value Date    CHLORIDE 96 10/19/2017     Lab Results   Component Value Date    ELMO 7.4 10/19/2017     Lab Results   Component Value Date    CO2 27 10/19/2017     Lab Results   Component Value Date    BUN 33 10/19/2017     Lab Results   Component Value Date    CR 0.87 10/19/2017     Lab Results   Component Value Date     10/19/2017       Results reviewed, labs did NOT meet treatment parameters: Does not meet transfusion parameters.        Post Infusion Assessment:  Access discontinued per protocol.    Discharge Plan:   Requested refills of Epi pen, Ventolin and Nasachrom inhalers.   Per her, these are ordered by Dr. Avendaño who is no longer here.  I did look and she has refills available so she will call for these on her own.  She also said she hasn't heard about follow up with someone else as Dr. Avendaño is gone.  I did find a Discourse message sent to her previously regarding this with recommendations for MD's out side this facility.  I have copied and sent that message to her again.  Copy of  AVS reviewed with patient and/or family.  Patient will return Monday for next appointment.  Patient discharged in stable condition accompanied by: self.  Departure Mode: Ambulatory.  Face to Face time: 5 minutes.    Daylin Arizmendi RN

## 2017-10-19 NOTE — NURSING NOTE
"Chief Complaint   Patient presents with     Port Draw     labs drawn from port by rn.  vs taken     Port accessed with 20 gauge 3/4\" gripper needle and labs drawn by rn.  Port flushed with NS and heparin.  Pt tolerated well.  VS taken.  Pt checked in for next appt.  Basilia Schulz RN      "

## 2017-10-21 ENCOUNTER — HOSPITAL ENCOUNTER (EMERGENCY)
Facility: CLINIC | Age: 57
Discharge: HOME | End: 2017-10-21
Attending: EMERGENCY MEDICINE | Admitting: EMERGENCY MEDICINE
Payer: COMMERCIAL

## 2017-10-21 ENCOUNTER — NURSE TRIAGE (OUTPATIENT)
Dept: NURSING | Facility: CLINIC | Age: 57
End: 2017-10-21

## 2017-10-21 ENCOUNTER — APPOINTMENT (OUTPATIENT)
Dept: ULTRASOUND IMAGING | Facility: CLINIC | Age: 57
End: 2017-10-21
Attending: EMERGENCY MEDICINE
Payer: COMMERCIAL

## 2017-10-21 VITALS
HEIGHT: 65 IN | TEMPERATURE: 97.5 F | SYSTOLIC BLOOD PRESSURE: 129 MMHG | BODY MASS INDEX: 31.99 KG/M2 | OXYGEN SATURATION: 97 % | DIASTOLIC BLOOD PRESSURE: 94 MMHG | HEART RATE: 114 BPM | WEIGHT: 192 LBS | RESPIRATION RATE: 20 BRPM

## 2017-10-21 DIAGNOSIS — M79.89 LEG SWELLING: ICD-10-CM

## 2017-10-21 LAB
ANION GAP SERPL CALCULATED.3IONS-SCNC: 10 MMOL/L (ref 3–14)
ANISOCYTOSIS BLD QL SMEAR: SLIGHT
BASOPHILS # BLD AUTO: 0 10E9/L (ref 0–0.2)
BASOPHILS NFR BLD AUTO: 0 %
BUN SERPL-MCNC: 26 MG/DL (ref 7–30)
CALCIUM SERPL-MCNC: 7.4 MG/DL (ref 8.5–10.1)
CHLORIDE SERPL-SCNC: 99 MMOL/L (ref 94–109)
CO2 SERPL-SCNC: 29 MMOL/L (ref 20–32)
CREAT SERPL-MCNC: 0.86 MG/DL (ref 0.52–1.04)
D DIMER PPP FEU-MCNC: 0.4 UG/ML FEU (ref 0–0.5)
DIFFERENTIAL METHOD BLD: ABNORMAL
EOSINOPHIL # BLD AUTO: 0 10E9/L (ref 0–0.7)
EOSINOPHIL NFR BLD AUTO: 0 %
ERYTHROCYTE [DISTWIDTH] IN BLOOD BY AUTOMATED COUNT: 16.9 % (ref 10–15)
GFR SERPL CREATININE-BSD FRML MDRD: 68 ML/MIN/1.7M2
GLUCOSE SERPL-MCNC: 157 MG/DL (ref 70–99)
HCT VFR BLD AUTO: 34.3 % (ref 35–47)
HGB BLD-MCNC: 10.4 G/DL (ref 11.7–15.7)
LYMPHOCYTES # BLD AUTO: 0.3 10E9/L (ref 0.8–5.3)
LYMPHOCYTES NFR BLD AUTO: 1.8 %
MCH RBC QN AUTO: 25 PG (ref 26.5–33)
MCHC RBC AUTO-ENTMCNC: 30.3 G/DL (ref 31.5–36.5)
MCV RBC AUTO: 83 FL (ref 78–100)
METAMYELOCYTES # BLD: 0.5 10E9/L
METAMYELOCYTES NFR BLD MANUAL: 2.7 %
MONOCYTES # BLD AUTO: 0.7 10E9/L (ref 0–1.3)
MONOCYTES NFR BLD AUTO: 3.6 %
MYELOCYTES # BLD: 0.2 10E9/L
MYELOCYTES NFR BLD MANUAL: 0.9 %
NEUTROPHILS # BLD AUTO: 16.7 10E9/L (ref 1.6–8.3)
NEUTROPHILS NFR BLD AUTO: 87.4 %
NRBC # BLD AUTO: 0.3 10*3/UL
NRBC BLD AUTO-RTO: 2 /100
NT-PROBNP SERPL-MCNC: 75 PG/ML (ref 0–900)
PLATELET # BLD AUTO: 158 10E9/L (ref 150–450)
PLATELET # BLD EST: ABNORMAL 10*3/UL
POIKILOCYTOSIS BLD QL SMEAR: SLIGHT
POLYCHROMASIA BLD QL SMEAR: SLIGHT
POTASSIUM SERPL-SCNC: 4.2 MMOL/L (ref 3.4–5.3)
PROMYELOCYTES # BLD MANUAL: 0.7 10E9/L
PROMYELOCYTES NFR BLD MANUAL: 3.6 %
RBC # BLD AUTO: 4.16 10E12/L (ref 3.8–5.2)
SODIUM SERPL-SCNC: 138 MMOL/L (ref 133–144)
TROPONIN I SERPL-MCNC: <0.015 UG/L (ref 0–0.04)
WBC # BLD AUTO: 19.1 10E9/L (ref 4–11)

## 2017-10-21 PROCEDURE — 83880 ASSAY OF NATRIURETIC PEPTIDE: CPT | Performed by: EMERGENCY MEDICINE

## 2017-10-21 PROCEDURE — 99284 EMERGENCY DEPT VISIT MOD MDM: CPT | Mod: 25

## 2017-10-21 PROCEDURE — 85025 COMPLETE CBC W/AUTO DIFF WBC: CPT | Performed by: EMERGENCY MEDICINE

## 2017-10-21 PROCEDURE — 84484 ASSAY OF TROPONIN QUANT: CPT | Performed by: EMERGENCY MEDICINE

## 2017-10-21 PROCEDURE — 86900 BLOOD TYPING SEROLOGIC ABO: CPT

## 2017-10-21 PROCEDURE — 99284 EMERGENCY DEPT VISIT MOD MDM: CPT | Mod: Z6 | Performed by: EMERGENCY MEDICINE

## 2017-10-21 PROCEDURE — 80048 BASIC METABOLIC PNL TOTAL CA: CPT | Performed by: EMERGENCY MEDICINE

## 2017-10-21 PROCEDURE — 85379 FIBRIN DEGRADATION QUANT: CPT | Performed by: EMERGENCY MEDICINE

## 2017-10-21 PROCEDURE — 86850 RBC ANTIBODY SCREEN: CPT

## 2017-10-21 PROCEDURE — 86901 BLOOD TYPING SEROLOGIC RH(D): CPT

## 2017-10-21 PROCEDURE — 25000132 ZZH RX MED GY IP 250 OP 250 PS 637: Performed by: EMERGENCY MEDICINE

## 2017-10-21 PROCEDURE — 86923 COMPATIBILITY TEST ELECTRIC: CPT

## 2017-10-21 PROCEDURE — 93970 EXTREMITY STUDY: CPT

## 2017-10-21 RX ORDER — FUROSEMIDE 20 MG
20 TABLET ORAL DAILY
Qty: 14 TABLET | Refills: 3 | Status: ON HOLD | OUTPATIENT
Start: 2017-10-21 | End: 2017-10-31

## 2017-10-21 RX ORDER — OXYCODONE HYDROCHLORIDE 5 MG/1
10 TABLET ORAL ONCE
Status: COMPLETED | OUTPATIENT
Start: 2017-10-21 | End: 2017-10-21

## 2017-10-21 RX ADMIN — OXYCODONE HYDROCHLORIDE 10 MG: 5 TABLET ORAL at 19:39

## 2017-10-21 NOTE — ED NOTES
Pt with bilateral LE pain and edema, concerned for DVT. She has 2-3+ pitting edema up to the knees. Having difficulty with walking and weight bearing. Pt is currently getting chemo for large cell aggressive non-Hodgkins Lymphoma. Received 10/6, receiving every 21 days and will get next round 10/23.

## 2017-10-21 NOTE — ED AVS SNAPSHOT
[facility], Emergency Department    [address] [place] [id number]    Phone:  [phone]                                       Tisha Arias   MRN: [id number]    Department:  [facility], Emergency Department   Date of Visit:  10/21/2017           Patient Information     Date Of Birth          1960        Your diagnoses for this visit were:     Leg swelling        You were seen by Daly Contreras MD.        Discharge Instructions       Follow up with Heme Onc on Monday.    Future Appointments        Provider Department Dept Phone Center    10/23/2017 1:00 PM [facility] Lab Draw [phone] [facility]    10/23/2017 1:30 PM [facility] Infusion [facility] Blood and Marrow Transplant [phone] [facility]    10/23/2017 2:50 PM COTY Jessica [facility] Cancer [place] [address] [phone] [facility]    10/27/2017 6:30 AM [facility] Lab Draw [phone] [facility]    10/27/2017 7:00 AM Jessica Cox PA-C [facility] Cancer [facility] [phone] [facility]    11/2/2017 5:00 PM [facility] IMAGING [place] ULTRASOUND ROOM 3 [facility] [phone] [facility]    11/2/2017 6:00 PM Lab [facility] Lab [phone] [facility]    11/13/2017 4:30 PM Avelina Castro MD [facility] Endocrinology [phone] [facility]    11/27/2017 4:00 PM Shahla Crawley MD [facility] Cancer Clinic [phone] [facility]      24 Hour Appointment Hotline       To make an appointment at any [facility], call [phone] ([phone]). If you don't have a family doctor or clinic, we will help you find one. [facility] are conveniently located to serve the needs of you and your family.             Review of your medicines      Our records show that you are taking the medicines listed below. If these are incorrect, please call your family doctor or clinic.        Dose / Directions Last dose taken    acyclovir 400 MG tablet   Commonly known  as:  ZOVIRAX   Dose:  400 mg   Indication:  Prophylaxis of Herpes Simplex   Quantity:  30 tablet        Take 1 tablet (400 mg) by mouth 2 times daily   Refills:  0        allopurinol 300 MG tablet   Commonly known as:  ZYLOPRIM   Dose:  300 mg   Quantity:  30 tablet        Take 1 tablet (300 mg) by mouth daily   Refills:  0        aluminum chloride 20 % external solution   Commonly known as:  DRYSOL   Quantity:  60 mL        Apply topically At Bedtime   Refills:  3        AZMACORT IN   Dose:  2 puff        Inhale 2 puffs into the lungs as needed   Refills:  0        calcitRIOL 0.25 MCG capsule   Commonly known as:  ROCALTROL   Quantity:  270 capsule        2 tabs in am and 1 tab qhs   Refills:  3        CALCIUM CITRATE +D 315-250 MG-UNIT Tabs per tablet   Dose:  2 tablet   Quantity:  120 tablet   Generic drug:  calcium citrate-vitamin D        Take 2 tablets by mouth 3 times daily   Refills:  0        celecoxib 200 MG capsule   Commonly known as:  celeBREX   Quantity:  56 capsule        TAKE ONE CAPSULE BY MOUTH TWICE DAILY   Refills:  0        cetirizine 10 MG tablet   Commonly known as:  ZYRTEC ALLERGY   Dose:  10 mg   Quantity:  30 tablet        Take 1 tablet (10 mg) by mouth 3 times daily On hold for lab test.   Refills:  0        chlorhexidine 0.12 % solution   Commonly known as:  PERIDEX        Refills:  0        COMPOUND CONTAINING CONTROLLED SUBSTANCE - PHARMACY TO MIX COMPOUNDED MEDICATION   Commonly known as:  CMPD RX   Quantity:  120 g        Apply small amount to affected area two times daily.   Refills:  6        cromolyn 4 % ophthalmic solution   Commonly known as:  OPTICROM   Dose:  1 drop   Quantity:  10 mL        Place 1 drop into both eyes 4 times daily   Refills:  5        cyclobenzaprine 10 MG tablet   Commonly known as:  FLEXERIL   Dose:  10 mg   Quantity:  42 tablet        Take 1 tablet (10 mg) by mouth 3 times daily as needed On hold Dr Monsalve   Refills:  5        dexamethasone 4 MG tablet    Commonly known as:  DECADRON   Dose:  4 mg   Quantity:  60 tablet        Take 1 tablet (4 mg) by mouth 2 times daily (with meals) Or as directed by MD   Refills:  0        diazepam 5 MG tablet   Commonly known as:  VALIUM   Dose:  5 mg   Quantity:  90 tablet        Take 1 tablet (5 mg) by mouth 3 times daily as needed For muscle spasms   Refills:  2        diclofenac 1 % Gel topical gel   Commonly known as:  VOLTAREN   Quantity:  100 g        Apply affected area two times daily PRN using enclosed dosing card.   Refills:  1        diphenhydrAMINE 50 MG capsule   Commonly known as:  BENADRYL   Dose:  50 mg   Quantity:  56 capsule        Take 1 capsule (50 mg) by mouth nightly as needed for itching or sleep   Refills:  0        docusate sodium 100 MG tablet   Commonly known as:  COLACE   Dose:  100 mg   Quantity:  60 tablet        Take 100 mg by mouth daily   Refills:  1        EPINEPHrine 0.3 MG/0.3ML injection 2-pack   Commonly known as:  EPIPEN 2-CARIDAD   Dose:  0.3 mg   Quantity:  0.6 mL        Inject 0.3 mLs (0.3 mg) into the muscle once as needed for anaphylaxis   Refills:  3        fluconazole 200 MG tablet   Commonly known as:  DIFLUCAN   Dose:  200 mg   Indication:  Fungal Infection Prophylaxis   Quantity:  30 tablet        Take 1 tablet (200 mg) by mouth daily   Refills:  0        hydrochlorothiazide 12.5 MG capsule   Commonly known as:  MICROZIDE   Dose:  12.5 mg   Quantity:  90 capsule        Take 1 capsule (12.5 mg) by mouth daily   Refills:  2        levofloxacin 250 MG tablet   Commonly known as:  LEVAQUIN   Dose:  250 mg   Indication:  bacterial ppx   Quantity:  30 tablet        Take 1 tablet (250 mg) by mouth daily   Refills:  0        levothyroxine 112 MCG tablet   Commonly known as:  SYNTHROID/LEVOTHROID   Quantity:  189 tablet        Mon-Sat: (2 tabs daily) Sunday: 3 tabs   Refills:  3        lidocaine 5 % ointment   Commonly known as:  XYLOCAINE   Quantity:  35.44 g        SANJANA TOPICALLY QID PRN    Refills:  3        Lidocaine HCl Monohydrate Powd        Refills:  0        lidocaine visc 2% 2.5mL/5mL & maalox/mylanta w/ simeth 2.5mL/5mL & diphenhydrAMINE 5mg/5mL Susp suspension   Commonly known as:  MAGIC Mouthwash [facility]   Dose:  10 mL   Quantity:  360 mL        Swish and spit 10 mLs in mouth every 6 hours as needed for mouth sores   Refills:  1        lidocaine-prilocaine cream   Commonly known as:  EMLA   Quantity:  30 g        Apply topically as needed (port access pain.)   Refills:  1        MAGNESIUM CITRATE PO   Dose:  400 mg   Indication:  up to 3 times daily        Take 400 mg by mouth daily   Refills:  0        methocarbamol 750 MG tablet   Commonly known as:  ROBAXIN   Dose:  750 mg   Quantity:  360 tablet        Take 1 tablet (750 mg) by mouth 4 times daily as needed . Ok to take a 5th Robaxin on very severe days.   Refills:  1        mometasone 110 MCG/INH inhaler   Commonly known as:  ASMANEX 30 METERED DOSES   Dose:  1 puff   Quantity:  3 Inhaler        Inhale 1 puff into the lungs daily   Refills:  3        montelukast 10 MG tablet   Commonly known as:  SINGULAIR   Quantity:  120 tablet        TAKE TWO TABLETS BY MOUTH TWICE DAILY   Refills:  11        morphine 30 MG 12 hr tablet   Commonly known as:  MS CONTIN   Dose:  30 mg   Quantity:  120 tablet        Take 1 tablet (30 mg) by mouth every 8 hours . Take an addition pill at night such that your evening dose is 60mg   Refills:  0        NASALCROM 5.2 MG/ACT Aers Inhaler   Quantity:  1 Bottle   Generic drug:  cromolyn sodium        SPRAY ONE SPRAY( 1 ML) IN NOSTRIL DAILY   Refills:  6        OMEGA 3 PO   Dose:  5 mL        Take 5 mLs by mouth   Refills:  0        ondansetron 8 MG ODT tab   Commonly known as:  ZOFRAN ODT   Dose:  8 mg   Quantity:  90 tablet        Take 1 tablet (8 mg) by mouth every 8 hours as needed for nausea or vomiting   Refills:  3        * order for DME   Quantity:  2 Units        Equipment being ordered: compression  stockings. 20-30 mm or 30 - 40 mm as patient can tolerate. Physical therapy to determine if they should be above or below the knee.   Refills:  4        * order for DME   Quantity:  2 Device        Equipment being ordered: compression bra   Refills:  1        * order for DME   Quantity:  1 Units        Compression stockings, medium grade, please measure and fit. Please dispense a pair.   Refills:  0        oxyCODONE 30 MG IR tablet   Commonly known as:  ROXICODONE   Dose:  30 mg   Quantity:  60 tablet        Take 1 tablet (30 mg) by mouth every 4 hours as needed for moderate to severe pain   Refills:  0        polyethylene glycol powder   Commonly known as:  MIRALAX   Dose:  1 capful   Quantity:  510 g        Take 17 g by mouth daily   Refills:  1        potassium chloride SA 20 MEQ CR tablet   Commonly known as:  KLOR-CON   Dose:  20 mEq   Quantity:  90 tablet        Take 1 tablet (20 mEq) by mouth daily   Refills:  3        PROBIOTIC DAILY PO   Dose:  1 capsule        Take 1 capsule by mouth daily Lacto acid bifidobacterium   Refills:  0        ranitidine 75 MG tablet   Commonly known as:  ZANTAC   Dose:  75 mg   Quantity:  270 tablet        Take 1 tablet (75 mg) by mouth 3 times daily   Refills:  3        SUMAtriptan 50 MG tablet   Commonly known as:  IMITREX   Dose:  50 mg   Quantity:  5 tablet        Take 1 tablet (50 mg) by mouth at onset of headache for migraine (may repeat in 2 hours as needed, max 2 tablets daily)   Refills:  3        tacrolimus 0.1 % ointment   Commonly known as:  PROTOPIC   Quantity:  60 g        Apply topically 2 times daily   Refills:  3        tamoxifen 20 MG tablet   Commonly known as:  NOLVADEX   Dose:  20 mg   Quantity:  90 tablet        Take 1 tablet (20 mg) by mouth daily   Refills:  3        triamcinolone 0.025 % ointment   Commonly known as:  KENALOG   Quantity:  80 g        Apply topically 2 times daily Mix with 1 lb jar of vaseline or aquaphor   Refills:  3        TUMS 500 MG  chewable tablet   Dose:  1.5 chew tab   Quantity:  180 chew tab   Generic drug:  calcium carbonate        Take 2 tablets (1,000 mg) by mouth nightly as needed for other (cramps)   Refills:  3        * UNABLE TO FIND   Dose:  1 tablet        1 tablet 3 times daily MEDICATION NAME: calcium D-Glucarate   Refills:  0        * UNABLE TO FIND   Dose:  2 tablet   Indication:  On hold for drug testing        2 tablets 3 times daily MEDICATION NAME: Natural D-Hist (on hold during chemo)   Refills:  0        * UNABLE TO FIND        MEDICATION NAME: Tumeric 3 capsules daily (on hold during chemo)   Refills:  0        * UNABLE TO FIND   Dose:  1 tablet        1 tablet 3 times daily MEDICATION NAME: Digestzymes   Refills:  0        * UNABLE TO FIND   Dose:  1 tablet   Indication:  P5P50        1 tablet daily MEDICATION NAME: Pure Encapsulations   Refills:  0        VENTOLIN  (90 BASE) MCG/ACT Inhaler   Quantity:  18 g   Generic drug:  albuterol        INHALE 2 PUFFS INTO THE LUNGS EVERY 4 HOURS AS NEEDED FOR SHORTNESS OF BREATH OR DIFFICULT BREATHING OR WHEEZING   Refills:  3        vitamin D3 2000 UNITS Caps   Dose:  47562 Units   Quantity:  60 capsule        Take 10,000 Units by mouth daily   Refills:  4        * Notice:  This list has 8 medication(s) that are the same as other medications prescribed for you. Read the directions carefully, and ask your doctor or other care provider to review them with you.            Procedures and tests performed during your visit     Basic metabolic panel    CBC with platelets differential    D dimer quantitative    Nt probnp inpatient (BNP)    Troponin I    US Lower Extremity Venous Duplex Bilateral      Orders Needing Specimen Collection     Ordered          10/21/17 1920  UA with Microscopic - STAT, [facility]: STAT, Needs to be Collected     Scheduled Task Status   10/21/17 1921 Collect UA with Microscopic Open   Order Class:  PCU Collect                  Pending Results     Date and  Time Order Name Status Description    10/18/2017 1324 Platelets prepare order unit In process             Pending Culture Results     No orders found from 10/19/2017 to 10/22/2017.            Pending Results Instructions     If you had any lab results that were not finalized at the time of your Discharge, you can call the ED Lab Result RN at [phone]. You will be contacted by this team for any positive Lab results or changes in treatment. The nurses are available 7 days a week from 10A to 6:30P.  You can leave a message 24 hours per day and they will return your call.        Thank you for choosing [facility]       Thank you for choosing [facility] for your care. Our goal is always to provide you with excellent care. Hearing back from our patients is one way we can continue to improve our services. Please take a few minutes to complete the written survey that you may receive in the mail after you visit with us. Thank you!        [facility] Information     [employer] gives you secure access to your electronic health record. If you see a primary care provider, you can also send messages to your care team and make appointments. If you have questions, please call your primary care clinic.  If you do not have a primary care provider, please call [phone] and they will assist you.        Care EveryWhere ID     This is your Care EveryWhere ID. This could be used by other organizations to access your [facility] medical records  [id number]        Equal Access to Services     RODRIGO ZAMORA : Hadii sacha Venegas, waaxda luqadaha, qaybta kaalmajosseline lama, ranjan martin. So [facility] [phone].    ATENCIÓN: Si habla español, tiene a stiles disposición servicios gratuitos de asistencia lingüística. Llame al [phone].    We comply with applicable federal civil rights laws and [place] laws. We do not discriminate on the basis of race, color, national origin, age, disability, sex, sexual  orientation, or gender identity.            After Visit Summary       This is your record. Keep this with you and show to your community pharmacist(s) and doctor(s) at your next visit.

## 2017-10-21 NOTE — ED AVS SNAPSHOT
[facility], [place], Emergency Department    [address] [place] [id number]    Phone:  [phone]                                       Tisha Arias   MRN: [id number]    Department:  [facility], Emergency Department   Date of Visit:  10/21/2017           After Visit Summary Signature Page     I have received my discharge instructions, and my questions have been answered. I have discussed any challenges I see with this plan with the nurse or doctor.    ..........................................................................................................................................  Patient/Patient Representative Signature      ..........................................................................................................................................  Patient Representative Print Name and Relationship to Patient    ..................................................               ................................................  Date                                            Time    ..........................................................................................................................................  Reviewed by Signature/Title    ...................................................              ..............................................  Date                                                            Time

## 2017-10-21 NOTE — TELEPHONE ENCOUNTER
"  Reason for Disposition    Patient sounds very sick or weak to the triager    Additional Information    Negative: Followed a leg injury    Negative: Leg swelling is main symptom    Negative: Back pain radiating (shooting) into leg(s)    Negative: Knee pain is main symptom    Negative: Ankle pain is main symptom    Negative: Pregnant    Negative: Chest pain    Negative: Difficulty breathing    Negative: Entire foot is cool or blue in comparison to other side    Negative: Unable to walk    Negative: [1] Red area or streak AND [2] fever    Negative: [1] Swollen joint AND [2] fever    Negative: [1] Cast on leg or ankle AND [2] now increased pain    Answer Assessment - Initial Assessment Questions  1. ONSET: \"When did the pain start?\"       today  2. LOCATION: \"Where is the pain located?\"       Both legs  3. PAIN: \"How bad is the pain?\"    (Scale 1-10; or mild, moderate, severe)    -  MILD (1-3): doesn't interfere with normal activities     -  MODERATE (4-7): interferes with normal activities (e.g., work or school) or awakens from sleep, limping     -  SEVERE (8-10): excruciating pain, unable to do any normal activities, unable to walk      severe  4. WORK OR EXERCISE: \"Has there been any recent work or exercise that involved this part of the body?\"       She currently is in treatment for cancer and she feels that the thyroid medication they just started her on is causing this pain  5. CAUSE: \"What do you think is causing the leg pain?\"      New medication  6. OTHER SYMPTOMS: \"Do you have any other symptoms?\" (e.g., chest pain, back pain, breathing difficulty, swelling, rash, fever, numbness, weakness)      Bilateral lympthedema  7. PREGNANCY: \"Is there any chance you are pregnant?\" \"When was your last menstrual period?\"      no    Protocols used: LEG PAIN-ADULT-AH    "

## 2017-10-22 DIAGNOSIS — C73 PAPILLARY CARCINOMA, FOLLICULAR VARIANT (H): ICD-10-CM

## 2017-10-22 DIAGNOSIS — C77.0 METASTASIS TO CERVICAL LYMPH NODE (H): ICD-10-CM

## 2017-10-22 DIAGNOSIS — E89.0 POSTSURGICAL HYPOTHYROIDISM: ICD-10-CM

## 2017-10-22 LAB
ABO + RH BLD: NORMAL
ABO + RH BLD: NORMAL
BLD GP AB SCN SERPL QL: NORMAL
BLD PROD TYP BPU: NORMAL
BLOOD BANK CMNT PATIENT-IMP: NORMAL
NUM BPU REQUESTED: 2
SPECIMEN EXP DATE BLD: NORMAL

## 2017-10-22 NOTE — ED PROVIDER NOTES
"  History     Chief Complaint   Patient presents with     Leg Pain     HPI  Tisha Arias is a 57 year old female with a history of recently diagnosed DLBCL, currently on DA-REPOCH (started on 10/6/17, next on 10/23/17), breast cancer, on Tamoxifen, status-post bilateral mastectomies and BENJIE/BSO, papillary thyroid cancer, status-post total thyroidectomy (2013), who presents with leg pain. The patient reports that she has had intermittent cramping in her bilateral lower extremities since she had her thyroid removed in 2013, however, over the past couple weeks the cramping has significantly worsened becoming more painful and frequent. She generally experiences muscle cramping and spasms from the ankle to the mid-calf bilaterally lasting several hours at a time. She also reports that she has had significant bilateral lower extremity swelling, reports \"2-3+ pitting edema,\" since she started chemotherapy a couple weeks ago. She notes that she is on 12.5 mg of hydrochlorothiazide, though denies being on any other diuretics. She denies any weeping from her lower extremities. She took morphine 30 mg around 5:00 PM for her pain.     I have reviewed the Medications, Allergies, Past Medical and Surgical History, and Social History in the Epic system.    Review of Systems   Cardiovascular: Positive for leg swelling (bilateral).   Musculoskeletal:        Positive for bilateral cramping and spasms in the bilateral lower extremities, from the ankle up to the mid-calf.   All other systems reviewed and are negative.      Physical Exam   BP: 155/89  Pulse: 114  Temp: 97.5  F (36.4  C)  Resp: 20  Height: 165.1 cm (5' 5\")  Weight: 87.1 kg (192 lb)  SpO2: 98 %      Physical Exam   Constitutional: She is oriented to person, place, and time. She appears well-developed and well-nourished.   HENT:   Head: Atraumatic.   Right Ear: External ear normal.   Left Ear: External ear normal.   Eyes: Conjunctivae and EOM are normal. No scleral " icterus.   Neck: Normal range of motion. Neck supple.   Cardiovascular: Normal rate and regular rhythm.    Pulmonary/Chest: Effort normal. No respiratory distress.   Abdominal: Soft. There is no tenderness. There is no rebound and no guarding.   Musculoskeletal: Normal range of motion. She exhibits edema. She exhibits no tenderness.   2+ pitting edema   Neurological: She is alert and oriented to person, place, and time.   Skin: Skin is warm and dry. No rash noted.   Psychiatric: She has a normal mood and affect. Her behavior is normal.   Nursing note and vitals reviewed.      ED Course     ED Course     Procedures     7:05 PM  The patient was seen and examined by Dr. Contreras in Room 8.    Labs Ordered and Resulted from Time of ED Arrival Up to the Time of Departure from the ED   CBC WITH PLATELETS DIFFERENTIAL - Abnormal; Notable for the following:        Result Value    WBC 19.1 (*)     Hemoglobin 10.4 (*)     Hematocrit 34.3 (*)     MCH 25.0 (*)     MCHC 30.3 (*)     RDW 16.9 (*)     Nucleated RBCs 2 (*)     Absolute Neutrophil 16.7 (*)     Absolute Lymphocytes 0.3 (*)     Absolute Metamyelocytes 0.5 (*)     Absolute Myelocytes 0.2 (*)     Absolute Promyeloctyes 0.7 (*)     All other components within normal limits   BASIC METABOLIC PANEL - Abnormal; Notable for the following:     Glucose 157 (*)     Calcium 7.4 (*)     All other components within normal limits   NT PROBNP INPATIENT   TROPONIN I   D DIMER QUANTITATIVE   ROUTINE UA WITH MICROSCOPIC   STRICT INTAKE AND OUTPUT            Assessments & Plan (with Medical Decision Making)   57-year-old female who is currently on chemotherapy who is here for bilateral leg pain.  Patient is stable Emergency Department with vital signs with the only abnormality noted of tachycardia to 110.  She is not hypotensive, afebrile.  Physical exam demonstrates 2-3+ edema of the bilateral lower extremities however there is no erythema, no drainage, no crepitus, or any evidence of  infection at this time.    She was evaluated today with a CBC, BMP, d-dimer, as well as DVT ultrasounds.  Her labs were only notable for a white blood cell count of 19.  The rest of her workup today was unremarkable.  Her DVT ultrasounds were negative.  At this time I spoke with Heme/Oncology on-call.  Her WBC is most likely elevated due to her recent Neulasta injection.  Again, she has no signs or symptoms of infection at this time, and her edema is symmetric , so I have a really low suspicion for this being infectious in nature.  She also was feeling symptomatically much improved.  Heme/Oncology was comfortable with her being discharged home at this time and the patient was much prefered to be discharged home.  I did offer her the option of admission for observation if she was uncomfortable but again she really did not want to be in the hospital at all costs.  I gave her return precautions including any redness, fevers or any other concern at this time.  She verbalizes understanding of this plan and was discharged home with a short course of Lasix to help with the lower extremity edema and she has pain medications at home already.    This part of the medical record was transcribed by Stiven Chappell, Medical Scribe, from a dictation done by Daly Contreras MD.       I have reviewed the nursing notes.    I have reviewed the findings, diagnosis, plan and need for follow up with the patient.    Discharge Medication List as of 10/21/2017 10:50 PM          Final diagnoses:   Leg swelling     Stefania CHRISTIE, am serving as a trained medical scribe to document services personally performed by Daly Contreras MD, based on the provider's statements to me.   Daly CHRISTIE MD, was physically present and have reviewed and verified the accuracy of this note documented by Stefania Rivera.     10/21/2017   [facility], [place], EMERGENCY DEPARTMENT     Daly Contreras MD  10/22/17 0033

## 2017-10-23 ENCOUNTER — APPOINTMENT (OUTPATIENT)
Dept: LAB | Facility: CLINIC | Age: 57
End: 2017-10-23
Payer: COMMERCIAL

## 2017-10-23 ENCOUNTER — INFUSION THERAPY VISIT (OUTPATIENT)
Dept: TRANSPLANT | Facility: CLINIC | Age: 57
End: 2017-10-23
Payer: COMMERCIAL

## 2017-10-23 ENCOUNTER — ONCOLOGY VISIT (OUTPATIENT)
Dept: ONCOLOGY | Facility: CLINIC | Age: 57
End: 2017-10-23
Attending: PHYSICIAN ASSISTANT
Payer: COMMERCIAL

## 2017-10-23 VITALS
WEIGHT: 189.3 LBS | TEMPERATURE: 98.9 F | OXYGEN SATURATION: 98 % | BODY MASS INDEX: 31.5 KG/M2 | DIASTOLIC BLOOD PRESSURE: 83 MMHG | HEART RATE: 110 BPM | SYSTOLIC BLOOD PRESSURE: 137 MMHG | RESPIRATION RATE: 16 BRPM

## 2017-10-23 VITALS
HEIGHT: 65 IN | TEMPERATURE: 98.9 F | WEIGHT: 189.3 LBS | HEART RATE: 110 BPM | BODY MASS INDEX: 31.54 KG/M2 | OXYGEN SATURATION: 98 % | SYSTOLIC BLOOD PRESSURE: 137 MMHG | DIASTOLIC BLOOD PRESSURE: 83 MMHG | RESPIRATION RATE: 16 BRPM

## 2017-10-23 DIAGNOSIS — C85.10 HIGH GRADE B-CELL LYMPHOMA (H): Primary | ICD-10-CM

## 2017-10-23 DIAGNOSIS — C83.33 DIFFUSE LARGE B-CELL LYMPHOMA OF INTRA-ABDOMINAL LYMPH NODES (H): Primary | ICD-10-CM

## 2017-10-23 DIAGNOSIS — G43.909 MIGRAINE WITHOUT STATUS MIGRAINOSUS, NOT INTRACTABLE, UNSPECIFIED MIGRAINE TYPE: ICD-10-CM

## 2017-10-23 DIAGNOSIS — C85.10 HIGH GRADE B-CELL LYMPHOMA (H): ICD-10-CM

## 2017-10-23 LAB
ABO + RH BLD: NORMAL
ABO + RH BLD: NORMAL
ALBUMIN SERPL-MCNC: 2.9 G/DL (ref 3.4–5)
ALP SERPL-CCNC: 97 U/L (ref 40–150)
ALT SERPL W P-5'-P-CCNC: 44 U/L (ref 0–50)
ANION GAP SERPL CALCULATED.3IONS-SCNC: 12 MMOL/L (ref 3–14)
ANISOCYTOSIS BLD QL SMEAR: ABNORMAL
AST SERPL W P-5'-P-CCNC: 26 U/L (ref 0–45)
BASOPHILS # BLD AUTO: 0 10E9/L (ref 0–0.2)
BASOPHILS NFR BLD AUTO: 0 %
BILIRUB SERPL-MCNC: 0.3 MG/DL (ref 0.2–1.3)
BLD GP AB SCN SERPL QL: NORMAL
BLD PROD TYP BPU: NORMAL
BLD PROD TYP BPU: NORMAL
BLD UNIT ID BPU: 0
BLD UNIT ID BPU: 0
BLOOD BANK CMNT PATIENT-IMP: NORMAL
BLOOD PRODUCT CODE: NORMAL
BLOOD PRODUCT CODE: NORMAL
BPU ID: NORMAL
BPU ID: NORMAL
BUN SERPL-MCNC: 22 MG/DL (ref 7–30)
CALCIUM SERPL-MCNC: 7.4 MG/DL (ref 8.5–10.1)
CHLORIDE SERPL-SCNC: 94 MMOL/L (ref 94–109)
CO2 SERPL-SCNC: 31 MMOL/L (ref 20–32)
CREAT SERPL-MCNC: 1.01 MG/DL (ref 0.52–1.04)
DIFFERENTIAL METHOD BLD: ABNORMAL
EOSINOPHIL # BLD AUTO: 0 10E9/L (ref 0–0.7)
EOSINOPHIL NFR BLD AUTO: 0 %
ERYTHROCYTE [DISTWIDTH] IN BLOOD BY AUTOMATED COUNT: 18.5 % (ref 10–15)
GFR SERPL CREATININE-BSD FRML MDRD: 56 ML/MIN/1.7M2
GLUCOSE SERPL-MCNC: 233 MG/DL (ref 70–99)
HCT VFR BLD AUTO: 33.7 % (ref 35–47)
HGB BLD-MCNC: 10.3 G/DL (ref 11.7–15.7)
LYMPHOCYTES # BLD AUTO: 0.1 10E9/L (ref 0.8–5.3)
LYMPHOCYTES NFR BLD AUTO: 0.9 %
MCH RBC QN AUTO: 25.3 PG (ref 26.5–33)
MCHC RBC AUTO-ENTMCNC: 30.6 G/DL (ref 31.5–36.5)
MCV RBC AUTO: 83 FL (ref 78–100)
METAMYELOCYTES # BLD: 0.1 10E9/L
METAMYELOCYTES NFR BLD MANUAL: 0.9 %
MONOCYTES # BLD AUTO: 0.1 10E9/L (ref 0–1.3)
MONOCYTES NFR BLD AUTO: 0.9 %
NEUTROPHILS # BLD AUTO: 13.4 10E9/L (ref 1.6–8.3)
NEUTROPHILS NFR BLD AUTO: 97.3 %
NRBC # BLD AUTO: 0.1 10*3/UL
NRBC BLD AUTO-RTO: 1 /100
PLATELET # BLD AUTO: 188 10E9/L (ref 150–450)
POLYCHROMASIA BLD QL SMEAR: SLIGHT
POTASSIUM SERPL-SCNC: 3.6 MMOL/L (ref 3.4–5.3)
PROT SERPL-MCNC: 5.6 G/DL (ref 6.8–8.8)
RBC # BLD AUTO: 4.07 10E12/L (ref 3.8–5.2)
SODIUM SERPL-SCNC: 136 MMOL/L (ref 133–144)
SPECIMEN EXP DATE BLD: NORMAL
TRANSFUSION STATUS PATIENT QL: NORMAL
WBC # BLD AUTO: 13.8 10E9/L (ref 4–11)

## 2017-10-23 PROCEDURE — 86901 BLOOD TYPING SEROLOGIC RH(D): CPT

## 2017-10-23 PROCEDURE — 85025 COMPLETE CBC W/AUTO DIFF WBC: CPT | Performed by: PHYSICIAN ASSISTANT

## 2017-10-23 PROCEDURE — 99212 OFFICE O/P EST SF 10 MIN: CPT | Mod: ZF

## 2017-10-23 PROCEDURE — 36591 DRAW BLOOD OFF VENOUS DEVICE: CPT

## 2017-10-23 PROCEDURE — 25000128 H RX IP 250 OP 636: Mod: ZF

## 2017-10-23 PROCEDURE — 40000268 ZZH STATISTIC NO CHARGES: Mod: ZF

## 2017-10-23 PROCEDURE — 86900 BLOOD TYPING SEROLOGIC ABO: CPT

## 2017-10-23 PROCEDURE — 99214 OFFICE O/P EST MOD 30 MIN: CPT | Mod: ZP | Performed by: PHYSICIAN ASSISTANT

## 2017-10-23 PROCEDURE — 80053 COMPREHEN METABOLIC PANEL: CPT | Performed by: PHYSICIAN ASSISTANT

## 2017-10-23 PROCEDURE — 86850 RBC ANTIBODY SCREEN: CPT

## 2017-10-23 RX ORDER — HEPARIN SODIUM (PORCINE) LOCK FLUSH IV SOLN 100 UNIT/ML 100 UNIT/ML
5 SOLUTION INTRAVENOUS
Status: COMPLETED | OUTPATIENT
Start: 2017-10-23 | End: 2017-10-23

## 2017-10-23 RX ADMIN — SODIUM CHLORIDE, PRESERVATIVE FREE 5 ML: 5 INJECTION INTRAVENOUS at 13:30

## 2017-10-23 ASSESSMENT — PAIN SCALES - GENERAL
PAINLEVEL: EXTREME PAIN (8)
PAINLEVEL: EXTREME PAIN (8)

## 2017-10-23 NOTE — NURSING NOTE
"Oncology Rooming Note    October 23, 2017 2:39 PM   Tisha Arias is a 57 year old female who presents for:    Chief Complaint   Patient presents with     Oncology Clinic Visit     Lymphoma F/U     Initial Vitals: /83 (BP Location: Left arm)  Pulse 110  Temp 98.9  F (37.2  C) (Oral)  Resp 16  Ht 1.651 m (5' 5\")  Wt 85.9 kg (189 lb 4.8 oz)  SpO2 98%  BMI 31.5 kg/m2 Estimated body mass index is 31.5 kg/(m^2) as calculated from the following:    Height as of this encounter: 1.651 m (5' 5\").    Weight as of this encounter: 85.9 kg (189 lb 4.8 oz). Body surface area is 1.98 meters squared.  Extreme Pain (8) Comment: Chest, Upper ABD, Shoulders and Tumor site   No LMP recorded. Patient has had a hysterectomy.  Allergies reviewed: Yes  Medications reviewed: Yes    Medications: Medication refills not needed today.  Pharmacy name entered into [facility]: [id number] - [place], [place] - [address] [address] [place]    Clinical concerns: None Rashida Jaffe was NOT notified.    7 minutes for nursing intake (face to face time)     Naima Saavedra LPN              "

## 2017-10-23 NOTE — LETTER
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 She presented in 8/2017 with dramatically worse chest and back pain. CT 9/1/17 showed lytic lesion right 10th rib extending to right T9-10 neural foramen with vertebral body invasion. There was associated conglomerate of lymphadenopathy around the mid T-spine. She had a CT guided biopsy showing B-cell high grade lymphoma. Pathology showed \" The morphology shows a population of large cells, diffuse lymphoid infiltrate. The cells are atypical with abundant mitotic and apoptotic activity and single cell necrosis. Tumor is CD45 and CD79a positive and focally weakly CD30 positive. The other stains revealed positivity for CD20, BCL6, BCL2 and MUM1, and CD10 and CD21 negative. The CD5 and CD3 highlighted background T-cells.  MYC expression was equivocal with approximately 40% nuclei staining.  Ki-67 proliferative index is greater than 90-95%. The immunohistochemistry is suggestive of non-GCB immunophenotype of diffuse large B-cell lymphoma. The cytogenetics did not show rearrangement of the BCL2 or c-MYC.  There is the presence of BCL6 rearrangement in 78% of cells and gains of MYC and BCL2, but no amplifications. \"    Staging LP and BMBx were negative for lymphoma. She started DA-REPOCH 10/6/17. She did well with chemo other than rigors during CIVI. D/c 10/11/17. Given Neulasta 10/12/17. Presents today in routine follow-up.     Interval History: Elvin presents alone today. Unfortunately, the end of last week her peripheral edema worsened and she " "went to ED on 10/21 because she could not stand the pain. She was given Lasix and this has been helping. DVT was also r/o. Her leg circumference went down several inches. She has been wearing compression stockings and elevate their legs. Her mucositis is slowly getting better. She is fatigued but has been going to work. Has not noticed a change yet in chest tightness or \"shallow\" breathing. The dexamethasone does help. She denies any nausea. Bowels are regular with meds. No issues with urinating. No fevers/chills. ROS otherwise negative.     Current Outpatient Prescriptions   Medication     furosemide (LASIX) 20 MG tablet     celecoxib (CELEBREX) 200 MG capsule     dexamethasone (DECADRON) 4 MG tablet     allopurinol (ZYLOPRIM) 300 MG tablet     fluconazole (DIFLUCAN) 200 MG tablet     acyclovir (ZOVIRAX) 400 MG tablet     levofloxacin (LEVAQUIN) 250 MG tablet     order for DME     magic mouthwash suspension (diphenhydrAMINE, lidocaine, aluminum-magnesium & simethicone)     oxyCODONE (ROXICODONE) 30 MG IR tablet     cetirizine (ZYRTEC ALLERGY) 10 MG tablet     diphenhydrAMINE (BENADRYL) 50 MG capsule     cyclobenzaprine (FLEXERIL) 10 MG tablet     methocarbamol (ROBAXIN) 750 MG tablet     ondansetron (ZOFRAN-ODT) 8 MG ODT tab     morphine (MS CONTIN) 30 MG 12 hr tablet     lidocaine-prilocaine (EMLA) cream     diazepam (VALIUM) 5 MG tablet     levothyroxine (SYNTHROID/LEVOTHROID) 112 MCG tablet     hydrochlorothiazide (MICROZIDE) 12.5 MG capsule     SUMAtriptan (IMITREX) 50 MG tablet     montelukast (SINGULAIR) 10 MG tablet     potassium chloride SA (POTASSIUM CHLORIDE) 20 MEQ CR tablet     cromolyn (OPTICROM) 4 % ophthalmic solution     Cholecalciferol (VITAMIN D3) 2000 UNITS CAPS     lidocaine (XYLOCAINE) 5 % ointment     order for DME     ranitidine (ZANTAC) 75 MG tablet     calcitRIOL (ROCALTROL) 0.25 MCG capsule     diclofenac (VOLTAREN) 1 % GEL     MAGNESIUM CITRATE PO     calcium citrate-vitamin D (CALCIUM " "CITRATE +D) 315-250 MG-UNIT TABS     UNABLE TO FIND     UNABLE TO FIND     UNABLE TO FIND     Omega-3 Fatty Acids (OMEGA 3 PO)     Probiotic Product (PROBIOTIC DAILY PO)     docusate sodium 100 MG tablet     COMPOUND CONTAINING CONTROLLED SUBSTANCE (CMPD RX) - PHARMACY TO MIX COMPOUNDED MEDICATION     chlorhexidine (PERIDEX) 0.12 % solution     EPINEPHrine (EPIPEN 2-CARIDAD) 0.3 MG/0.3ML injection     VENTOLIN  (90 BASE) MCG/ACT Inhaler     NASALCROM 5.2 MG/ACT AERS Inhaler     order for DME     tamoxifen (NOLVADEX) 20 MG tablet     Lidocaine HCl Monohydrate POWD     aluminum chloride (DRYSOL) 20 % external solution     tacrolimus (PROTOPIC) 0.1 % ointment     triamcinolone (KENALOG) 0.025 % ointment     UNABLE TO FIND     mometasone (ASMANEX 30 METERED DOSES) 110 MCG/INH inhaler     UNABLE TO FIND     calcium carbonate (TUMS) 500 MG chewable tablet     Triamcinolone Acetonide (AZMACORT IN)     polyethylene glycol (MIRALAX) powder     No current facility-administered medications for this visit.        PHYSICAL EXAM:  /83 (BP Location: Left arm)  Pulse 110  Temp 98.9  F (37.2  C) (Oral)  Resp 16  Ht 1.651 m (5' 5\")  Wt 85.9 kg (189 lb 4.8 oz)  SpO2 98%  BMI 31.5 kg/m2  General: Alert, oriented, pleasant, NAD  HEENT: Normocephalic, atraumatic, PERRLA, EOMI. Moist mucus membranes, no lesions or thrush  Neck: No cervical or supraclavicular LAD.  Axillary: No LAD  Lungs: CTA bilaterally, normal work of breathing  Cardiac: RRR, S1, S2, no murmurs  Abdomen: Soft, nontender, nondistended. Normoactive bowel sounds. No hepatosplenomegaly, masses  Neuro: CNII-XII grossly intact  Extremities: 2+ pedal edema up to mid-thighs b/l, equal     Labs:    10/23/2017 13:34 10/23/2017 13:35   Sodium  136   Potassium  3.6   Chloride  94   Carbon Dioxide  31   Urea Nitrogen  22   Creatinine  1.01   GFR Estimate  56 (L)   GFR Estimate If Black  68   Calcium  7.4 (L)   Anion Gap  12   Albumin  2.9 (L)   Protein Total  5.6 (L) " "  Bilirubin Total  0.3   Alkaline Phosphatase  97   ALT  44   AST  26   Glucose  233 (H)   WBC 13.8 (H)    Hemoglobin 10.3 (L)    Hematocrit 33.7 (L)    Platelet Count 188    RBC Count 4.07    MCV 83    MCH 25.3 (L)    MCHC 30.6 (L)    RDW 18.5 (H)    Diff Method Manual Differential    % Neutrophils 97.3    % Lymphocytes 0.9    % Monocytes 0.9    % Eosinophils 0.0    % Basophils 0.0    % Metamyelocytes 0.9    Nucleated RBCs 1 (H)    Absolute Neutrophil 13.4 (H)    Absolute Lymphocytes 0.1 (L)    Absolute Monocytes 0.1    Absolute Eosinophils 0.0    Absolute Basophils 0.0    Absolute Metamyelocytes 0.1 (H)    Absolute Nucleated RBC 0.1    Anisocytosis Moderate    Polychromasia Slight          Assessment & Plan:     1. DLBCL, \"double-hit\"-like: S/p 1 cycle of DA-R-EPOCH. She tolerated chemotherapy overall fairly well with some mucositis last week and mild infusion reaction to continuous infusion of chemotherapy. This responded well to e-meds and was able to be resumed without difficulty. No evidence of TLS on labs, remain on allopurinol for 1 month. She is recovering well out of toro. However, ANC nadired 100 so will likely not dose escalate with cycle 2 per dosing parameters. She will be due for admission to cycle 2 on 10/27. Plan for restaging following 3 cycles per Dr. Sauceda.    2. HEME: Now recovered. Hgb 10.3, platelets 188K, WBC 13.8/ANC 13.4. Transfuse if Hgb <8 and platelets <10K.     3. ID: No active concerns. ANC has now recovered. Continue taking  mg BID, levaquin 250 mg daily, and fluconazole 200 mg daily.     4. Chest tightness/sensation of SOB: From lymphoma. Start taper dex to 4 mg in AM and 2 pm in PM x 3 days and then 2 mg in AM and 2 mg in PM x 3 days and then 2 mg in AM. We will reassess at that time.     5. Mucositis: Improving. Continue salt and soda rinses 2-3x daily. MMW prn.     6. Peripheral edema: Likely secondary to high doses of steroids from several days and some third spacing " of IVFs. Increased end of last week/weekend so b/l US was done with no evidence of DVT. Continue on Lasix and start to taper steroids as above.     7. Chronic pain: Palliative care will still prescribe chronic pain meds.     Rashida Jaffe PA-C    [facility]  [address] [place] [address] [id number] [phone]

## 2017-10-23 NOTE — MR AVS SNAPSHOT
After Visit Summary   10/23/2017    Tisha Arias    MRN: [id number]           Patient Information     Date Of Birth          1960        Visit Information        Provider Department      10/23/2017 1:30 PM UC 5 ATC; UC BMT INFUSION [facility]        Today's Diagnoses     Diffuse large B-cell lymphoma of intra-abdominal lymph nodes (H)    -  1    High grade B-cell lymphoma (H)              [facility] [place] [place]  [address] [place] [address]  Phone: [phone]  Clinic Hours:   Monday-Thursday:7am to 7pm   Friday: 7am to 5pm   Weekends and holidays:    8am to noon (in general)  If your fever is 100.5  or greater,   call the clinic.  After hours call the   hospital at [phone] or   [phone]. Ask for the BMT   fellow on-call            Follow-ups after your visit        Your next 10 appointments already scheduled     Oct 23, 2017  2:50 PM CDT   (Arrive by 2:35 PM)   Return Visit with COTY Jessica   [facility] ([facility])    [address] [place] [id number] [phone]            Oct 27, 2017  6:30 AM CDT   [facility] with [facility] LAB DRAW   [facility] ([facility])    [address] [place] [id number]   [phone]            Oct 27, 2017  7:00 AM CDT   (Arrive by 6:45 AM)   Return Visit with Jessica Cox PA-C   [facility] ([facility])    [address] [place] [id number]   [phone]            Nov 02, 2017  5:00 PM CDT   US HEAD NECK SOFT TISSUE with US3   [facility] US ([facility])    [address] [place] [id number] [phone]           Please bring a list of your medicines (including vitamins, minerals and over-the-counter  drugs). Also, tell your doctor about any allergies you may have. Wear comfortable clothes and leave your valuables at home.  You do not need to do anything special to prepare for your exam.  Please call the Imaging Department at your exam site with any questions.            Nov 02, 2017  6:00 PM CDT   LAB with  LAB   [facility] ([facility])    [address] [place] [id number] [phone]           Patient must bring picture ID. Patient should be prepared to give a urine specimen  Please do not eat 10-12 hours before your appointment if you are coming in fasting for labs on lipids, cholesterol, or glucose (sugar). Pregnant women should follow their Care Team instructions. Water with medications is okay. Do not drink coffee or other fluids. If you have concerns about taking  your medications, please ask at office or if scheduling via [employer], send a message by clicking on Secure Messaging, Message Your Care Team.            Nov 13, 2017  4:30 PM CST   (Arrive by 4:15 PM)   RETURN ENDOCRINE with Avelina Castro MD   [facility] ([facility])    [address] [place] [id number] [phone]            Nov 27, 2017  4:00 PM CST   (Arrive by 3:45 PM)   Return Visit with Shahla Crawley MD   [facility] ([facility])    [address] [place] [id number] [phone]              Future tests that were ordered for you today     Open Standing Orders        Priority Remaining Interval Expires Ordered    Red blood cell prepare order unit Routine 99/100 CONDITIONAL (SPECIFY) BLOOD  10/22/2017            Who to contact     If you have questions or need follow up information about today's clinic visit or your schedule please contact [facility] directly at [phone].  Normal or non-critical lab and imaging  results will be communicated to you by [email or web address], letter or phone within 4 business days after the clinic has received the results. If you do not hear from us within 7 days, please contact the clinic through [employer] or phone. If you have a critical or abnormal lab result, we will notify you by phone as soon as possible.  Submit refill requests through [employer] or call your pharmacy and they will forward the refill request to us. Please allow 3 business days for your refill to be completed.          Additional Information About Your Visit        [employer] Information     [employer] gives you secure access to your electronic health record. If you see a primary care provider, you can also send messages to your care team and make appointments. If you have questions, please call your primary care clinic.  If you do not have a primary care provider, please call [phone] and they will assist you.        Care EveryWhere ID     This is your Care EveryWhere ID. This could be used by other organizations to access your [facility] medical records  [id number]        Your Vitals Were     Pulse Temperature Respirations Pulse Oximetry BMI (Body Mass Index)       110 98.9  F (37.2  C) (Oral) 16 98% 31.5 kg/m2        Blood Pressure from Last 3 Encounters:   10/23/17 137/83   10/21/17 (!) 129/94   10/19/17 132/85    Weight from Last 3 Encounters:   10/23/17 85.9 kg (189 lb 4.8 oz)   10/21/17 87.1 kg (192 lb)   10/19/17 87.1 kg (192 lb)              We Performed the Following     ABO/Rh type and screen     ABO/Rh type and screen     Blood component     Blood component     CBC with platelets differential     Comprehensive metabolic panel          Today's Medication Changes          These changes are accurate as of: 10/23/17  2:33 PM.  If you have any questions, ask your nurse or doctor.               These medicines have changed or have updated prescriptions.        Dose/Directions    diazepam 5 MG tablet   Commonly known as:  VALIUM   This  may have changed:    - when to take this  - additional instructions   Used for:  Chest wall pain        Dose:  5 mg   Take 1 tablet (5 mg) by mouth 3 times daily as needed For muscle spasms   Quantity:  90 tablet   Refills:  2                Recent Review Flowsheet Data     BMT Recent Results Latest Ref Rng & Units 10/9/2017 10/10/2017 10/11/2017 10/16/2017 10/19/2017 10/21/2017 10/23/2017    WBC 4.0 - 11.0 10e9/L 7.3 6.0 5.6 0.5(LL) 5.2 19.1(H) 13.8(H)    Hemoglobin 11.7 - 15.7 g/dL 9.6(L) 9.8(L) 10.8(L) 10.5(L) 10.6(L) 10.4(L) 10.3(L)    Platelet Count 150 - 450 10e9/L 191 186 230 133(L) 81(L) 158 188    Neutrophils (Absolute) 1.6 - 8.3 10e9/L 6.5 4.9 5.1 0.1(LL) 3.7 16.7(H) -    INR 0.86 - 1.14 - - - - - - -    Sodium 133 - 144 mmol/L 143 142 141 138 135 138 136    Potassium 3.4 - 5.3 mmol/L 3.7 3.6 3.6 3.6 3.7 4.2 3.6    Chloride 94 - 109 mmol/L 106 104 103 99 96 99 94    Glucose 70 - 99 mg/dL 156(H) 120(H) 234(H) 119(H) 130(H) 157(H) 233(H)    Urea Nitrogen 7 - 30 mg/dL 17 20 17 23 33(H) 26 22    Creatinine 0.52 - 1.04 mg/dL 0.70 0.73 0.72 0.84 0.87 0.86 1.01    Calcium (Total) 8.5 - 10.1 mg/dL 8.0(L) 8.1(L) 8.2(L) 7.2(L) 7.4(L) 7.4(L) 7.4(L)    Protein (Total) 6.8 - 8.8 g/dL 5.7(L) 5.7(L) 6.3(L) 5.8(L) - - 5.6(L)    Albumin 3.4 - 5.0 g/dL 2.9(L) 2.9(L) 3.1(L) 2.9(L) - - 2.9(L)    Alkaline Phosphatase 40 - 150 U/L 57 51 57 45 - - 97    AST 0 - 45 U/L 18 14 12 7 - - 26    ALT 0 - 50 U/L 25 25 29 29 - - 44    MCV 78 - 100 fl 82 82 80 81 80 83 83               Primary Care Provider Office Phone # Fax #    Shahla Raul Crawley [phone]       [address] [place] [id number]        Equal Access to Services     RODRIGO ZAMORA : Kevin Venegas, girish ge, timmy lama, ranjan martin. [facility] [phone].    ATENCIÓN: Si habla español, tiene a stiles disposición servicios gratuitos de asistencia lingüística. Llame al  [phone].    We comply with applicable federal civil rights laws and [place] laws. We do not discriminate on the basis of race, color, national origin, age, disability, sex, sexual orientation, or gender identity.            Thank you!     Thank you for choosing [facility]  for your care. Our goal is always to provide you with excellent care. Hearing back from our patients is one way we can continue to improve our services. Please take a few minutes to complete the written survey that you may receive in the mail after your visit with us. Thank you!             Your Updated Medication List - Protect others around you: Learn how to safely use, store and throw away your medicines at www.disposemymeds.org.          This list is accurate as of: 10/23/17  2:33 PM.  Always use your most recent med list.                   Brand Name Dispense Instructions for use Diagnosis    acyclovir 400 MG tablet    ZOVIRAX    30 tablet    Take 1 tablet (400 mg) by mouth 2 times daily    High grade B-cell lymphoma (H)       allopurinol 300 MG tablet    ZYLOPRIM    30 tablet    Take 1 tablet (300 mg) by mouth daily    High grade B-cell lymphoma (H)       aluminum chloride 20 % external solution    DRYSOL    60 mL    Apply topically At Bedtime    Rash, Intertrigo       AZMACORT IN      Inhale 2 puffs into the lungs as needed        calcitRIOL 0.25 MCG capsule    ROCALTROL    270 capsule    2 tabs in am and 1 tab qhs    Postsurgical hypothyroidism, Papillary carcinoma, follicular variant (H), Metastasis to cervical lymph node (H)       CALCIUM CITRATE +D 315-250 MG-UNIT Tabs per tablet   Generic drug:  calcium citrate-vitamin D     120 tablet    Take 2 tablets by mouth 3 times daily        celecoxib 200 MG capsule    celeBREX    56 capsule    TAKE ONE CAPSULE BY MOUTH TWICE DAILY    Chest wall pain       cetirizine 10 MG tablet    ZYRTEC ALLERGY    30 tablet    Take 1 tablet (10 mg) by mouth 3 times daily On  hold for lab test.    High grade B-cell lymphoma (H)       chlorhexidine 0.12 % solution    PERIDEX          COMPOUND CONTAINING CONTROLLED SUBSTANCE - PHARMACY TO MIX COMPOUNDED MEDICATION    CMPD RX    120 g    Apply small amount to affected area two times daily.    Neoplasm related pain       cromolyn 4 % ophthalmic solution    OPTICROM    10 mL    Place 1 drop into both eyes 4 times daily    Idiopathic mast cell activation syndrome (H)       cyclobenzaprine 10 MG tablet    FLEXERIL    42 tablet    Take 1 tablet (10 mg) by mouth 3 times daily as needed On hold Dr Monsalve    High grade B-cell lymphoma (H)       dexamethasone 4 MG tablet    DECADRON    60 tablet    Take 1 tablet (4 mg) by mouth 2 times daily (with meals) Or as directed by MD    High grade B-cell lymphoma (H)       diazepam 5 MG tablet    VALIUM    90 tablet    Take 1 tablet (5 mg) by mouth 3 times daily as needed For muscle spasms    Chest wall pain       diclofenac 1 % Gel topical gel    VOLTAREN    100 g    Apply affected area two times daily PRN using enclosed dosing card.    Myofascial pain       diphenhydrAMINE 50 MG capsule    BENADRYL    56 capsule    Take 1 capsule (50 mg) by mouth nightly as needed for itching or sleep    High grade B-cell lymphoma (H)       docusate sodium 100 MG tablet    COLACE    60 tablet    Take 100 mg by mouth daily    Acute constipation       EPINEPHrine 0.3 MG/0.3ML injection 2-pack    EPIPEN 2-CARIDAD    0.6 mL    Inject 0.3 mLs (0.3 mg) into the muscle once as needed for anaphylaxis        fluconazole 200 MG tablet    DIFLUCAN    30 tablet    Take 1 tablet (200 mg) by mouth daily    High grade B-cell lymphoma (H)       furosemide 20 MG tablet    LASIX    14 tablet    Take 1 tablet (20 mg) by mouth daily        hydrochlorothiazide 12.5 MG capsule    MICROZIDE    90 capsule    Take 1 capsule (12.5 mg) by mouth daily    Hypocalcemia       levofloxacin 250 MG tablet    LEVAQUIN    30 tablet    Take 1 tablet (250 mg) by  mouth daily    High grade B-cell lymphoma (H)       levothyroxine 112 MCG tablet    SYNTHROID/LEVOTHROID    189 tablet    Mon-Sat: (2 tabs daily) Sunday: 3 tabs    Postsurgical hypothyroidism, Papillary carcinoma, follicular variant (H), Postsurgical hypoparathyroidism (H), Thyroid cancer (H)       lidocaine 5 % ointment    XYLOCAINE    35.44 g    SANJANA TOPICALLY QID PRN    Chest wall pain       Lidocaine HCl Monohydrate Powd           lidocaine visc 2% 2.5mL/5mL & maalox/mylanta w/ simeth 2.5mL/5mL & diphenhydrAMINE 5mg/5mL Susp suspension    [facility]    360 mL    Swish and spit 10 mLs in mouth every 6 hours as needed for mouth sores    DLBCL (diffuse large B cell lymphoma) (H), Stomatitis and mucositis       lidocaine-prilocaine cream    EMLA    30 g    Apply topically as needed (port access pain.)    Neoplasm related pain, Chest wall pain       MAGNESIUM CITRATE PO      Take 400 mg by mouth daily        methocarbamol 750 MG tablet    ROBAXIN    360 tablet    Take 1 tablet (750 mg) by mouth 4 times daily as needed . Ok to take a 5th Robaxin on very severe days.    Myofascial pain       mometasone 110 MCG/INH inhaler    ASMANEX 30 METERED DOSES    3 Inhaler    Inhale 1 puff into the lungs daily    Intermittent asthma, uncomplicated       montelukast 10 MG tablet    SINGULAIR    120 tablet    TAKE TWO TABLETS BY MOUTH TWICE DAILY    Idiopathic mast cell activation syndrome (H)       morphine 30 MG 12 hr tablet    MS CONTIN    120 tablet    Take 1 tablet (30 mg) by mouth every 8 hours . Take an addition pill at night such that your evening dose is 60mg    Neoplasm related pain       NASALCROM 5.2 MG/ACT Aers Inhaler   Generic drug:  cromolyn sodium     1 Bottle    SPRAY ONE SPRAY( 1 ML) IN NOSTRIL DAILY    Mast cell disease, systemic       OMEGA 3 PO      Take 5 mLs by mouth        ondansetron 8 MG ODT tab    ZOFRAN ODT    90 tablet    Take 1 tablet (8 mg) by mouth every 8 hours as needed for nausea or  vomiting    Nausea with vomiting       * order for DME     2 Units    Equipment being ordered: compression stockings. 20-30 mm or 30 - 40 mm as patient can tolerate. Physical therapy to determine if they should be above or below the knee.    Venous stasis       * order for DME     2 Device    Equipment being ordered: compression bra    Malignant neoplasm of right female breast, unspecified site of breast       * order for DME     1 Units    Compression stockings, medium grade, please measure and fit. Please dispense a pair.    Peripheral edema       oxyCODONE 30 MG IR tablet    ROXICODONE    60 tablet    Take 1 tablet (30 mg) by mouth every 4 hours as needed for moderate to severe pain    High grade B-cell lymphoma (H)       polyethylene glycol powder    MIRALAX    510 g    Take 17 g by mouth daily    Acute constipation       potassium chloride SA 20 MEQ CR tablet    KLOR-CON    90 tablet    Take 1 tablet (20 mEq) by mouth daily    Hypokalemia       PROBIOTIC DAILY PO      Take 1 capsule by mouth daily Lacto acid bifidobacterium    Breast cancer, unspecified laterality, Thyroid cancer (H), Chronic arthralgias of knees and hips, unspecified laterality       ranitidine 75 MG tablet    ZANTAC    270 tablet    Take 1 tablet (75 mg) by mouth 3 times daily    Mast cell disease, systemic       SUMAtriptan 50 MG tablet    IMITREX    5 tablet    Take 1 tablet (50 mg) by mouth at onset of headache for migraine (may repeat in 2 hours as needed, max 2 tablets daily)    Migraine without status migrainosus, not intractable, unspecified migraine type       tacrolimus 0.1 % ointment    PROTOPIC    60 g    Apply topically 2 times daily    Rash, Intertrigo       tamoxifen 20 MG tablet    NOLVADEX    90 tablet    Take 1 tablet (20 mg) by mouth daily    Malignant neoplasm of female breast, unspecified laterality, unspecified site of breast       triamcinolone 0.025 % ointment    KENALOG    80 g    Apply topically 2 times daily Mix with  1 lb jar of vaseline or aquaphor    Intertrigo, Rash       TUMS 500 MG chewable tablet   Generic drug:  calcium carbonate     180 chew tab    Take 2 tablets (1,000 mg) by mouth nightly as needed for other (cramps)        * UNABLE TO FIND      1 tablet 3 times daily MEDICATION NAME: calcium D-Glucarate        * UNABLE TO FIND      2 tablets 3 times daily MEDICATION NAME: Natural D-Hist (on hold during chemo)    Breast cancer, unspecified laterality, Papillary carcinoma, follicular variant (H), Chronic musculoskeletal pain       * UNABLE TO FIND      MEDICATION NAME: Tumeric 3 capsules daily (on hold during chemo)        * UNABLE TO FIND      1 tablet 3 times daily MEDICATION NAME: Digestzymes    Thyroid cancer (H), Postsurgical hypothyroidism, Postsurgical hypoparathyroidism (H)       * UNABLE TO FIND      1 tablet daily MEDICATION NAME: Pure Encapsulations    Thyroid cancer (H), Postsurgical hypothyroidism, Postsurgical hypoparathyroidism (H)       VENTOLIN  (90 BASE) MCG/ACT Inhaler   Generic drug:  albuterol     18 g    INHALE 2 PUFFS INTO THE LUNGS EVERY 4 HOURS AS NEEDED FOR SHORTNESS OF BREATH OR DIFFICULT BREATHING OR WHEEZING    Mild intermittent asthma without complication       vitamin D3 2000 UNITS Caps     60 capsule    Take 10,000 Units by mouth daily    Thyroid cancer (H), Postsurgical hypothyroidism, Papillary carcinoma, follicular variant (H), Postsurgical hypoparathyroidism (H)       * Notice:  This list has 8 medication(s) that are the same as other medications prescribed for you. Read the directions carefully, and ask your doctor or other care provider to review them with you.

## 2017-10-23 NOTE — NURSING NOTE
Chief Complaint   Patient presents with     Port Draw     labs drawn from port by rn (previously accessed).  vs taken.     Labs drawn from port (previously accessed) by rn.  port flushed with saline and heparin, vitals checked, pt checked in for next appointment.  Ioana Bragg RN

## 2017-10-23 NOTE — MR AVS SNAPSHOT
After Visit Summary   10/23/2017    Tisha Arias    MRN: [id number]           Patient Information     Date Of Birth          1960        Visit Information        Provider Department      10/23/2017 2:50 PM Rashida Jaffe PA [facility]        Today's Diagnoses     High grade B-cell lymphoma (H)    -  1       Follow-ups after your visit        Your next 10 appointments already scheduled     Oct 27, 2017  6:30 AM CDT   [facility] Draw with [facility] LAB DRAW   [facility] Lab Draw ([facility])    [address] [place] [id number]   [phone]            Oct 27, 2017  7:00 AM CDT   (Arrive by 6:45 AM)   Return Visit with Jessica Cox PA-C   [facility] ([facility])    [address] [place] [id number]   [phone]            Nov 02, 2017  5:00 PM CDT   US HEAD NECK SOFT TISSUE with UCUS3   [facility] US ([facility])    [address] [place] [id number] [phone]           Please bring a list of your medicines (including vitamins, minerals and over-the-counter drugs). Also, tell your doctor about any allergies you may have. Wear comfortable clothes and leave your valuables at home.  You do not need to do anything special to prepare for your exam.  Please call the Imaging Department at your exam site with any questions.            Nov 02, 2017  6:00 PM CDT   LAB with  LAB   [facility] ([facility])    [address] [place] [id number]   [phone]           Patient must bring picture ID. Patient should be prepared to give a urine specimen  Please do not eat 10-12 hours before your appointment if you are coming in fasting for labs on lipids, cholesterol, or glucose (sugar). Pregnant women should follow their Care  Team instructions. Water with medications is okay. Do not drink coffee or other fluids. If you have concerns about taking  your medications, please ask at office or if scheduling via [employer], send a message by clicking on Secure Messaging, Message Your Care Team.            Nov 13, 2017  4:30 PM CST   (Arrive by 4:15 PM)   RETURN ENDOCRINE with Avelina Castro MD   [facility] ([facility])    [address]  3rd Floor  [facility] [address] [phone]            Nov 27, 2017  4:00 PM CST   (Arrive by 3:45 PM)   Return Visit with Shahla Crawley MD   [facility] ([facility])    [address]  [address]  [facility] [address] [phone]              Who to contact     If you have questions or need follow up information about today's clinic visit or your schedule please contact [facility] directly at [phone].  Normal or non-critical lab and imaging results will be communicated to you by [employer], letter or phone within 4 business days after the clinic has received the results. If you do not hear from us within 7 days, please contact the clinic through [employer] or phone. If you have a critical or abnormal lab result, we will notify you by phone as soon as possible.  Submit refill requests through [employer] or call your pharmacy and they will forward the refill request to us. Please allow 3 business days for your refill to be completed.          Additional Information About Your Visit        [employer] Information     [employer] gives you secure access to your electronic health record. If you see a primary care provider, you can also send messages to your care team and make appointments. If you have questions, please call your primary care clinic.  If you do not have a primary care provider, please call [phone] and they will assist you.        Care EveryWhere ID     This is your Care  "EveryWhere ID. This could be used by other organizations to access your [place] medical records  [id number]        Your Vitals Were     Pulse Temperature Respirations Height Pulse Oximetry BMI (Body Mass Index)    110 98.9  F (37.2  C) (Oral) 16 1.651 m (5' 5\") 98% 31.5 kg/m2       Blood Pressure from Last 3 Encounters:   10/23/17 137/83   10/23/17 137/83   10/21/17 (!) 129/94    Weight from Last 3 Encounters:   10/23/17 85.9 kg (189 lb 4.8 oz)   10/23/17 85.9 kg (189 lb 4.8 oz)   10/21/17 87.1 kg (192 lb)              Today, you had the following     No orders found for display         Today's Medication Changes          These changes are accurate as of: 10/23/17  6:14 PM.  If you have any questions, ask your nurse or doctor.               These medicines have changed or have updated prescriptions.        Dose/Directions    diazepam 5 MG tablet   Commonly known as:  VALIUM   This may have changed:    - when to take this  - additional instructions   Used for:  Chest wall pain        Dose:  5 mg   Take 1 tablet (5 mg) by mouth 3 times daily as needed For muscle spasms   Quantity:  90 tablet   Refills:  2                Primary Care Provider Office Phone # Fax #    Shahla Raul Crawley [phone]       [address] [place] [id number]        Equal Access to Services     RODRIGO ZAMORA AH: Hadii sacha huff Soesteban, waaxda luqadaha, qaybta kaalmada daina, ranjan martin. So [facility] [phone].    ATENCIÓN: Si habla español, tiene a stiles disposición servicios gratuitos de asistencia lingüística. Llame al [phone].    We comply with applicable federal civil rights laws and [place] laws. We do not discriminate on the basis of race, color, national origin, age, disability, sex, sexual orientation, or gender identity.            Thank you!     Thank you for choosing [facility]  for your care. Our goal is always to provide you with " excellent care. Hearing back from our patients is one way we can continue to improve our services. Please take a few minutes to complete the written survey that you may receive in the mail after your visit with us. Thank you!             Your Updated Medication List - Protect others around you: Learn how to safely use, store and throw away your medicines at www.disposemymeds.org.          This list is accurate as of: 10/23/17  6:14 PM.  Always use your most recent med list.                   Brand Name Dispense Instructions for use Diagnosis    acyclovir 400 MG tablet    ZOVIRAX    30 tablet    Take 1 tablet (400 mg) by mouth 2 times daily    High grade B-cell lymphoma (H)       allopurinol 300 MG tablet    ZYLOPRIM    30 tablet    Take 1 tablet (300 mg) by mouth daily    High grade B-cell lymphoma (H)       aluminum chloride 20 % external solution    DRYSOL    60 mL    Apply topically At Bedtime    Rash, Intertrigo       AZMACORT IN      Inhale 2 puffs into the lungs as needed        calcitRIOL 0.25 MCG capsule    ROCALTROL    270 capsule    2 tabs in am and 1 tab qhs    Postsurgical hypothyroidism, Papillary carcinoma, follicular variant (H), Metastasis to cervical lymph node (H)       CALCIUM CITRATE +D 315-250 MG-UNIT Tabs per tablet   Generic drug:  calcium citrate-vitamin D     120 tablet    Take 2 tablets by mouth 3 times daily        celecoxib 200 MG capsule    celeBREX    56 capsule    TAKE ONE CAPSULE BY MOUTH TWICE DAILY    Chest wall pain       cetirizine 10 MG tablet    ZYRTEC ALLERGY    30 tablet    Take 1 tablet (10 mg) by mouth 3 times daily On hold for lab test.    High grade B-cell lymphoma (H)       chlorhexidine 0.12 % solution    PERIDEX          COMPOUND CONTAINING CONTROLLED SUBSTANCE - PHARMACY TO MIX COMPOUNDED MEDICATION    CMPD RX    120 g    Apply small amount to affected area two times daily.    Neoplasm related pain       cromolyn 4 % ophthalmic solution    OPTICROM    10 mL    Place 1  drop into both eyes 4 times daily    Idiopathic mast cell activation syndrome (H)       cyclobenzaprine 10 MG tablet    FLEXERIL    42 tablet    Take 1 tablet (10 mg) by mouth 3 times daily as needed On hold Dr Monsalve    High grade B-cell lymphoma (H)       dexamethasone 4 MG tablet    DECADRON    60 tablet    Take 1 tablet (4 mg) by mouth 2 times daily (with meals) Or as directed by MD    High grade B-cell lymphoma (H)       diazepam 5 MG tablet    VALIUM    90 tablet    Take 1 tablet (5 mg) by mouth 3 times daily as needed For muscle spasms    Chest wall pain       diclofenac 1 % Gel topical gel    VOLTAREN    100 g    Apply affected area two times daily PRN using enclosed dosing card.    Myofascial pain       diphenhydrAMINE 50 MG capsule    BENADRYL    56 capsule    Take 1 capsule (50 mg) by mouth nightly as needed for itching or sleep    High grade B-cell lymphoma (H)       docusate sodium 100 MG tablet    COLACE    60 tablet    Take 100 mg by mouth daily    Acute constipation       EPINEPHrine 0.3 MG/0.3ML injection 2-pack    EPIPEN 2-CARIDAD    0.6 mL    Inject 0.3 mLs (0.3 mg) into the muscle once as needed for anaphylaxis        fluconazole 200 MG tablet    DIFLUCAN    30 tablet    Take 1 tablet (200 mg) by mouth daily    High grade B-cell lymphoma (H)       furosemide 20 MG tablet    LASIX    14 tablet    Take 1 tablet (20 mg) by mouth daily        hydrochlorothiazide 12.5 MG capsule    MICROZIDE    90 capsule    Take 1 capsule (12.5 mg) by mouth daily    Hypocalcemia       levofloxacin 250 MG tablet    LEVAQUIN    30 tablet    Take 1 tablet (250 mg) by mouth daily    High grade B-cell lymphoma (H)       levothyroxine 112 MCG tablet    SYNTHROID/LEVOTHROID    189 tablet    Mon-Sat: (2 tabs daily) Sunday: 3 tabs    Postsurgical hypothyroidism, Papillary carcinoma, follicular variant (H), Postsurgical hypoparathyroidism (H), Thyroid cancer (H)       lidocaine 5 % ointment    XYLOCAINE    35.44 g    SANJANA TOPICALLY  QID PRN    Chest wall pain       Lidocaine HCl Monohydrate Powd           lidocaine visc 2% 2.5mL/5mL & maalox/mylanta w/ simeth 2.5mL/5mL & diphenhydrAMINE 5mg/5mL Susp suspension    [facility]    360 mL    Swish and spit 10 mLs in mouth every 6 hours as needed for mouth sores    DLBCL (diffuse large B cell lymphoma) (H), Stomatitis and mucositis       lidocaine-prilocaine cream    EMLA    30 g    Apply topically as needed (port access pain.)    Neoplasm related pain, Chest wall pain       MAGNESIUM CITRATE PO      Take 400 mg by mouth daily        methocarbamol 750 MG tablet    ROBAXIN    360 tablet    Take 1 tablet (750 mg) by mouth 4 times daily as needed . Ok to take a 5th Robaxin on very severe days.    Myofascial pain       mometasone 110 MCG/INH inhaler    ASMANEX 30 METERED DOSES    3 Inhaler    Inhale 1 puff into the lungs daily    Intermittent asthma, uncomplicated       montelukast 10 MG tablet    SINGULAIR    120 tablet    TAKE TWO TABLETS BY MOUTH TWICE DAILY    Idiopathic mast cell activation syndrome (H)       morphine 30 MG 12 hr tablet    MS CONTIN    120 tablet    Take 1 tablet (30 mg) by mouth every 8 hours . Take an addition pill at night such that your evening dose is 60mg    Neoplasm related pain       NASALCROM 5.2 MG/ACT Aers Inhaler   Generic drug:  cromolyn sodium     1 Bottle    SPRAY ONE SPRAY( 1 ML) IN NOSTRIL DAILY    Mast cell disease, systemic       OMEGA 3 PO      Take 5 mLs by mouth        ondansetron 8 MG ODT tab    ZOFRAN ODT    90 tablet    Take 1 tablet (8 mg) by mouth every 8 hours as needed for nausea or vomiting    Nausea with vomiting       * order for DME     2 Units    Equipment being ordered: compression stockings. 20-30 mm or 30 - 40 mm as patient can tolerate. Physical therapy to determine if they should be above or below the knee.    Venous stasis       * order for DME     2 Device    Equipment being ordered: compression bra    Malignant neoplasm of  right female breast, unspecified site of breast       * order for DME     1 Units    Compression stockings, medium grade, please measure and fit. Please dispense a pair.    Peripheral edema       oxyCODONE 30 MG IR tablet    ROXICODONE    60 tablet    Take 1 tablet (30 mg) by mouth every 4 hours as needed for moderate to severe pain    High grade B-cell lymphoma (H)       polyethylene glycol powder    MIRALAX    510 g    Take 17 g by mouth daily    Acute constipation       potassium chloride SA 20 MEQ CR tablet    KLOR-CON    90 tablet    Take 1 tablet (20 mEq) by mouth daily    Hypokalemia       PROBIOTIC DAILY PO      Take 1 capsule by mouth daily Lacto acid bifidobacterium    Breast cancer, unspecified laterality, Thyroid cancer (H), Chronic arthralgias of knees and hips, unspecified laterality       ranitidine 75 MG tablet    ZANTAC    270 tablet    Take 1 tablet (75 mg) by mouth 3 times daily    Mast cell disease, systemic       SUMAtriptan 50 MG tablet    IMITREX    5 tablet    Take 1 tablet (50 mg) by mouth at onset of headache for migraine (may repeat in 2 hours as needed, max 2 tablets daily)    Migraine without status migrainosus, not intractable, unspecified migraine type       tacrolimus 0.1 % ointment    PROTOPIC    60 g    Apply topically 2 times daily    Rash, Intertrigo       tamoxifen 20 MG tablet    NOLVADEX    90 tablet    Take 1 tablet (20 mg) by mouth daily    Malignant neoplasm of female breast, unspecified laterality, unspecified site of breast       triamcinolone 0.025 % ointment    KENALOG    80 g    Apply topically 2 times daily Mix with 1 lb jar of vaseline or aquaphor    Intertrigo, Rash       TUMS 500 MG chewable tablet   Generic drug:  calcium carbonate     180 chew tab    Take 2 tablets (1,000 mg) by mouth nightly as needed for other (cramps)        * UNABLE TO FIND      1 tablet 3 times daily MEDICATION NAME: calcium D-Glucarate        * UNABLE TO FIND      2 tablets 3 times daily  MEDICATION NAME: Natural D-Hist (on hold during chemo)    Breast cancer, unspecified laterality, Papillary carcinoma, follicular variant (H), Chronic musculoskeletal pain       * UNABLE TO FIND      MEDICATION NAME: Tumeric 3 capsules daily (on hold during chemo)        * UNABLE TO FIND      1 tablet 3 times daily MEDICATION NAME: Digestzymes    Thyroid cancer (H), Postsurgical hypothyroidism, Postsurgical hypoparathyroidism (H)       * UNABLE TO FIND      1 tablet daily MEDICATION NAME: Pure Encapsulations    Thyroid cancer (H), Postsurgical hypothyroidism, Postsurgical hypoparathyroidism (H)       VENTOLIN  (90 BASE) MCG/ACT Inhaler   Generic drug:  albuterol     18 g    INHALE 2 PUFFS INTO THE LUNGS EVERY 4 HOURS AS NEEDED FOR SHORTNESS OF BREATH OR DIFFICULT BREATHING OR WHEEZING    Mild intermittent asthma without complication       vitamin D3 2000 UNITS Caps     60 capsule    Take 10,000 Units by mouth daily    Thyroid cancer (H), Postsurgical hypothyroidism, Papillary carcinoma, follicular variant (H), Postsurgical hypoparathyroidism (H)       * Notice:  This list has 8 medication(s) that are the same as other medications prescribed for you. Read the directions carefully, and ask your doctor or other care provider to review them with you.

## 2017-10-23 NOTE — PROGRESS NOTES
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 She presented in 8/2017 with dramatically worse chest and back pain. CT 9/1/17 showed lytic lesion right 10th rib extending to right T9-10 neural foramen with vertebral body invasion. There was associated conglomerate of lymphadenopathy around the mid T-spine. She had a CT guided biopsy showing B-cell high grade lymphoma. Pathology showed \" The morphology shows a population of large cells, diffuse lymphoid infiltrate. The cells are atypical with abundant mitotic and apoptotic activity and single cell necrosis. Tumor is CD45 and CD79a positive and focally weakly CD30 positive. The other stains revealed positivity for CD20, BCL6, BCL2 and MUM1, and CD10 and CD21 negative. The CD5 and CD3 highlighted background T-cells.  MYC expression was equivocal with approximately 40% nuclei staining.  Ki-67 proliferative index is greater than 90-95%. The immunohistochemistry is suggestive of non-GCB immunophenotype of diffuse large B-cell lymphoma. The cytogenetics did not show rearrangement of the BCL2 or c-MYC.  There is the presence of BCL6 rearrangement in 78% of cells and gains of MYC and BCL2, but no amplifications. \"    Staging LP and BMBx were negative for lymphoma. She started DA-REPOCH 10/6/17. She did well with chemo other than rigors during CIVI. D/c 10/11/17. Given Neulasta 10/12/17. Presents today in routine follow-up.     Interval History: Elvin presents alone today. Unfortunately, the end of last week her peripheral edema worsened and she went to ED on 10/21 because she could not stand the pain. She was given Lasix and this " "has been helping. DVT was also r/o. Her leg circumference went down several inches. She has been wearing compression stockings and elevate their legs. Her mucositis is slowly getting better. She is fatigued but has been going to work. Has not noticed a change yet in chest tightness or \"shallow\" breathing. The dexamethasone does help. She denies any nausea. Bowels are regular with meds. No issues with urinating. No fevers/chills. ROS otherwise negative.     Current Outpatient Prescriptions   Medication     furosemide (LASIX) 20 MG tablet     celecoxib (CELEBREX) 200 MG capsule     dexamethasone (DECADRON) 4 MG tablet     allopurinol (ZYLOPRIM) 300 MG tablet     fluconazole (DIFLUCAN) 200 MG tablet     acyclovir (ZOVIRAX) 400 MG tablet     levofloxacin (LEVAQUIN) 250 MG tablet     order for DME     magic mouthwash suspension (diphenhydrAMINE, lidocaine, aluminum-magnesium & simethicone)     oxyCODONE (ROXICODONE) 30 MG IR tablet     cetirizine (ZYRTEC ALLERGY) 10 MG tablet     diphenhydrAMINE (BENADRYL) 50 MG capsule     cyclobenzaprine (FLEXERIL) 10 MG tablet     methocarbamol (ROBAXIN) 750 MG tablet     ondansetron (ZOFRAN-ODT) 8 MG ODT tab     morphine (MS CONTIN) 30 MG 12 hr tablet     lidocaine-prilocaine (EMLA) cream     diazepam (VALIUM) 5 MG tablet     levothyroxine (SYNTHROID/LEVOTHROID) 112 MCG tablet     hydrochlorothiazide (MICROZIDE) 12.5 MG capsule     SUMAtriptan (IMITREX) 50 MG tablet     montelukast (SINGULAIR) 10 MG tablet     potassium chloride SA (POTASSIUM CHLORIDE) 20 MEQ CR tablet     cromolyn (OPTICROM) 4 % ophthalmic solution     Cholecalciferol (VITAMIN D3) 2000 UNITS CAPS     lidocaine (XYLOCAINE) 5 % ointment     order for DME     ranitidine (ZANTAC) 75 MG tablet     calcitRIOL (ROCALTROL) 0.25 MCG capsule     diclofenac (VOLTAREN) 1 % GEL     MAGNESIUM CITRATE PO     calcium citrate-vitamin D (CALCIUM CITRATE +D) 315-250 MG-UNIT TABS     UNABLE TO FIND     UNABLE TO FIND     UNABLE TO FIND " "    Omega-3 Fatty Acids (OMEGA 3 PO)     Probiotic Product (PROBIOTIC DAILY PO)     docusate sodium 100 MG tablet     COMPOUND CONTAINING CONTROLLED SUBSTANCE (CMPD RX) - PHARMACY TO MIX COMPOUNDED MEDICATION     chlorhexidine (PERIDEX) 0.12 % solution     EPINEPHrine (EPIPEN 2-CARIDAD) 0.3 MG/0.3ML injection     VENTOLIN  (90 BASE) MCG/ACT Inhaler     NASALCROM 5.2 MG/ACT AERS Inhaler     order for DME     tamoxifen (NOLVADEX) 20 MG tablet     Lidocaine HCl Monohydrate POWD     aluminum chloride (DRYSOL) 20 % external solution     tacrolimus (PROTOPIC) 0.1 % ointment     triamcinolone (KENALOG) 0.025 % ointment     UNABLE TO FIND     mometasone (ASMANEX 30 METERED DOSES) 110 MCG/INH inhaler     UNABLE TO FIND     calcium carbonate (TUMS) 500 MG chewable tablet     Triamcinolone Acetonide (AZMACORT IN)     polyethylene glycol (MIRALAX) powder     No current facility-administered medications for this visit.        PHYSICAL EXAM:  /83 (BP Location: Left arm)  Pulse 110  Temp 98.9  F (37.2  C) (Oral)  Resp 16  Ht 1.651 m (5' 5\")  Wt 85.9 kg (189 lb 4.8 oz)  SpO2 98%  BMI 31.5 kg/m2  General: Alert, oriented, pleasant, NAD  HEENT: Normocephalic, atraumatic, PERRLA, EOMI. Moist mucus membranes, no lesions or thrush  Neck: No cervical or supraclavicular LAD.  Axillary: No LAD  Lungs: CTA bilaterally, normal work of breathing  Cardiac: RRR, S1, S2, no murmurs  Abdomen: Soft, nontender, nondistended. Normoactive bowel sounds. No hepatosplenomegaly, masses  Neuro: CNII-XII grossly intact  Extremities: 2+ pedal edema up to mid-thighs b/l, equal     Labs:    10/23/2017 13:34 10/23/2017 13:35   Sodium  136   Potassium  3.6   Chloride  94   Carbon Dioxide  31   Urea Nitrogen  22   Creatinine  1.01   GFR Estimate  56 (L)   GFR Estimate If Black  68   Calcium  7.4 (L)   Anion Gap  12   Albumin  2.9 (L)   Protein Total  5.6 (L)   Bilirubin Total  0.3   Alkaline Phosphatase  97   ALT  44   AST  26   Glucose  233 (H) " "  WBC 13.8 (H)    Hemoglobin 10.3 (L)    Hematocrit 33.7 (L)    Platelet Count 188    RBC Count 4.07    MCV 83    MCH 25.3 (L)    MCHC 30.6 (L)    RDW 18.5 (H)    Diff Method Manual Differential    % Neutrophils 97.3    % Lymphocytes 0.9    % Monocytes 0.9    % Eosinophils 0.0    % Basophils 0.0    % Metamyelocytes 0.9    Nucleated RBCs 1 (H)    Absolute Neutrophil 13.4 (H)    Absolute Lymphocytes 0.1 (L)    Absolute Monocytes 0.1    Absolute Eosinophils 0.0    Absolute Basophils 0.0    Absolute Metamyelocytes 0.1 (H)    Absolute Nucleated RBC 0.1    Anisocytosis Moderate    Polychromasia Slight          Assessment & Plan:     1. DLBCL, \"double-hit\"-like: S/p 1 cycle of DA-R-EPOCH. She tolerated chemotherapy overall fairly well with some mucositis last week and mild infusion reaction to continuous infusion of chemotherapy. This responded well to e-meds and was able to be resumed without difficulty. No evidence of TLS on labs, remain on allopurinol for 1 month. She is recovering well out of toro. However, ANC nadired 100 so will likely not dose escalate with cycle 2 per dosing parameters. She will be due for admission to cycle 2 on 10/27. Plan for restaging following 3 cycles per Dr. Sauceda.    2. HEME: Now recovered. Hgb 10.3, platelets 188K, WBC 13.8/ANC 13.4. Transfuse if Hgb <8 and platelets <10K.     3. ID: No active concerns. ANC has now recovered. Continue taking  mg BID, levaquin 250 mg daily, and fluconazole 200 mg daily.     4. Chest tightness/sensation of SOB: From lymphoma. Start taper dex to 4 mg in AM and 2 pm in PM x 3 days and then 2 mg in AM and 2 mg in PM x 3 days and then 2 mg in AM. We will reassess at that time.     5. Mucositis: Improving. Continue salt and soda rinses 2-3x daily. MMW prn.     6. Peripheral edema: Likely secondary to high doses of steroids from several days and some third spacing of IVFs. Increased end of last week/weekend so b/l US was done with no evidence of DVT. " Continue on Lasix and start to taper steroids as above.     7. Chronic pain: Palliative care will still prescribe chronic pain meds.     Rashida Jaffe PA-C    [facility]  [address] [place] [address] [phone]

## 2017-10-24 ENCOUNTER — TELEPHONE (OUTPATIENT)
Dept: FAMILY MEDICINE | Facility: CLINIC | Age: 57
End: 2017-10-24

## 2017-10-24 ENCOUNTER — MYC MEDICAL ADVICE (OUTPATIENT)
Dept: PALLIATIVE CARE | Facility: CLINIC | Age: 57
End: 2017-10-24

## 2017-10-24 DIAGNOSIS — C85.10 HIGH GRADE B-CELL LYMPHOMA (H): ICD-10-CM

## 2017-10-24 DIAGNOSIS — G89.3 NEOPLASM RELATED PAIN: ICD-10-CM

## 2017-10-24 RX ORDER — CALCITRIOL 0.25 UG/1
CAPSULE, LIQUID FILLED ORAL
Qty: 270 CAPSULE | Refills: 3 | Status: ON HOLD | OUTPATIENT
Start: 2017-10-24 | End: 2018-01-22

## 2017-10-24 NOTE — TELEPHONE ENCOUNTER
calcitRIOL (ROCALTROL) 0.25 MCG capsule  Last Written Prescription Date:  11/2/16  Last Fill Quantity: 270,   # refills: 3  Last Office Visit : 5/2/11  Future Office visit:  11/3/17    Routing refill request to provider for review/approval because:   calcitRIOL (ROCALTROL) 0.25 MCG capsule . Provider preference

## 2017-10-24 NOTE — TELEPHONE ENCOUNTER
Pending Prescriptions:                       Disp   Refills    SUMAtriptan (IMITREX) 50 MG tablet         5 tabl*3        Sig: Take 1 tablet (50 mg) by mouth at onset of headache           for migraine (may repeat in 2 hours as needed,           max 2 tablets daily)    Routing refill request to provider for review/approval because:  Patient needs to be seen because it has been more than 1 year since last office visit.    Lorenza Connor RN

## 2017-10-24 NOTE — TELEPHONE ENCOUNTER
Reason for Call:  Other prescription    Detailed comments: Harini from [facility] calling stating she is calling again to get a script filled for Imatrex. Pharmacy stating they need a quantity of 9 because they can not break the bottle. Please call with information, thank you    Phone Number Patient can be reached at: Other phone number:  [phone]    Best Time: any    Can we leave a detailed message on this number? Not Applicable    Call taken on 10/24/2017 at 5:19 PM by GRETA CRAVEN

## 2017-10-25 RX ORDER — SUMATRIPTAN 50 MG/1
50 TABLET, FILM COATED ORAL
Qty: 9 TABLET | Refills: 3 | Status: ON HOLD | OUTPATIENT
Start: 2017-10-25 | End: 2017-12-11

## 2017-10-25 NOTE — TELEPHONE ENCOUNTER
See refill encounter from 10/23/17. Awaiting authorization from Dr.Hassumani Claudette Valles RN

## 2017-10-26 NOTE — TELEPHONE ENCOUNTER
Received [employer] message below from patient requesting refills of her pain medications.     Last refill ms contin: 9/27/2017 --  indicated 10/4/2017  Last refill oxycodone: 10/11/2017 (30mg tabs, #60 tabs) -- prior to this last refill was 9/27/2017, 10mg tabs, #180 tabs  Last office visit: 9/27/2017      Will route request to MD for review. Patient would like get medications tomorrow when she is in prior to admission for chemotherapy.     Reviewed [place]  Report.

## 2017-10-27 ENCOUNTER — HOSPITAL ENCOUNTER (INPATIENT)
Facility: CLINIC | Age: 57
LOS: 4 days | Discharge: HOME | End: 2017-10-31
Attending: INTERNAL MEDICINE | Admitting: INTERNAL MEDICINE
Payer: COMMERCIAL

## 2017-10-27 ENCOUNTER — ONCOLOGY VISIT (OUTPATIENT)
Dept: ONCOLOGY | Facility: CLINIC | Age: 57
End: 2017-10-27
Attending: PHYSICIAN ASSISTANT
Payer: COMMERCIAL

## 2017-10-27 ENCOUNTER — APPOINTMENT (OUTPATIENT)
Dept: LAB | Facility: CLINIC | Age: 57
End: 2017-10-27
Payer: COMMERCIAL

## 2017-10-27 ENCOUNTER — APPOINTMENT (OUTPATIENT)
Dept: CT IMAGING | Facility: CLINIC | Age: 57
End: 2017-10-27
Attending: PHYSICIAN ASSISTANT
Payer: COMMERCIAL

## 2017-10-27 VITALS
BODY MASS INDEX: 31.26 KG/M2 | HEIGHT: 65 IN | HEART RATE: 131 BPM | SYSTOLIC BLOOD PRESSURE: 136 MMHG | TEMPERATURE: 98.5 F | RESPIRATION RATE: 16 BRPM | DIASTOLIC BLOOD PRESSURE: 81 MMHG | WEIGHT: 187.61 LBS | OXYGEN SATURATION: 94 %

## 2017-10-27 DIAGNOSIS — M79.18 CHRONIC MUSCULOSKELETAL PAIN: ICD-10-CM

## 2017-10-27 DIAGNOSIS — G89.29 CHRONIC MUSCULOSKELETAL PAIN: ICD-10-CM

## 2017-10-27 DIAGNOSIS — C85.10 HIGH GRADE B-CELL LYMPHOMA (H): ICD-10-CM

## 2017-10-27 DIAGNOSIS — G89.3 NEOPLASM RELATED PAIN: ICD-10-CM

## 2017-10-27 DIAGNOSIS — Z98.84 STATUS POST BARIATRIC SURGERY: Primary | ICD-10-CM

## 2017-10-27 DIAGNOSIS — M79.18 MYOFASCIAL PAIN: ICD-10-CM

## 2017-10-27 DIAGNOSIS — K59.00 ACUTE CONSTIPATION: ICD-10-CM

## 2017-10-27 DIAGNOSIS — K12.1 STOMATITIS AND MUCOSITIS: ICD-10-CM

## 2017-10-27 DIAGNOSIS — R07.89 CHEST WALL PAIN: ICD-10-CM

## 2017-10-27 DIAGNOSIS — C83.30 DLBCL (DIFFUSE LARGE B CELL LYMPHOMA) (H): ICD-10-CM

## 2017-10-27 DIAGNOSIS — C73 PAPILLARY CARCINOMA, FOLLICULAR VARIANT (H): ICD-10-CM

## 2017-10-27 DIAGNOSIS — C85.10 HIGH GRADE B-CELL LYMPHOMA (H): Primary | ICD-10-CM

## 2017-10-27 DIAGNOSIS — K12.30 STOMATITIS AND MUCOSITIS: ICD-10-CM

## 2017-10-27 DIAGNOSIS — K59.00 CONSTIPATION, UNSPECIFIED CONSTIPATION TYPE: ICD-10-CM

## 2017-10-27 LAB
ALBUMIN SERPL-MCNC: 3 G/DL (ref 3.4–5)
ALP SERPL-CCNC: 77 U/L (ref 40–150)
ALT SERPL W P-5'-P-CCNC: 53 U/L (ref 0–50)
ANION GAP SERPL CALCULATED.3IONS-SCNC: 6 MMOL/L (ref 3–14)
ANISOCYTOSIS BLD QL SMEAR: SLIGHT
AST SERPL W P-5'-P-CCNC: 29 U/L (ref 0–45)
BASO STIPL BLD QL SMEAR: PRESENT
BASOPHILS # BLD AUTO: 0.1 10E9/L (ref 0–0.2)
BASOPHILS NFR BLD AUTO: 0.9 %
BILIRUB SERPL-MCNC: 0.3 MG/DL (ref 0.2–1.3)
BUN SERPL-MCNC: 20 MG/DL (ref 7–30)
CALCIUM SERPL-MCNC: 8 MG/DL (ref 8.5–10.1)
CHLORIDE SERPL-SCNC: 102 MMOL/L (ref 94–109)
CO2 SERPL-SCNC: 32 MMOL/L (ref 20–32)
CREAT SERPL-MCNC: 0.93 MG/DL (ref 0.52–1.04)
DACRYOCYTES BLD QL SMEAR: SLIGHT
DIFFERENTIAL METHOD BLD: ABNORMAL
EOSINOPHIL # BLD AUTO: 0 10E9/L (ref 0–0.7)
EOSINOPHIL NFR BLD AUTO: 0 %
ERYTHROCYTE [DISTWIDTH] IN BLOOD BY AUTOMATED COUNT: 19.3 % (ref 10–15)
GFR SERPL CREATININE-BSD FRML MDRD: 62 ML/MIN/1.7M2
GLUCOSE SERPL-MCNC: 112 MG/DL (ref 70–99)
HCT VFR BLD AUTO: 33.6 % (ref 35–47)
HGB BLD-MCNC: 10 G/DL (ref 11.7–15.7)
LDH SERPL L TO P-CCNC: 427 U/L (ref 81–234)
LYMPHOCYTES # BLD AUTO: 0.6 10E9/L (ref 0.8–5.3)
LYMPHOCYTES NFR BLD AUTO: 6.1 %
MCH RBC QN AUTO: 25.6 PG (ref 26.5–33)
MCHC RBC AUTO-ENTMCNC: 29.8 G/DL (ref 31.5–36.5)
MCV RBC AUTO: 86 FL (ref 78–100)
MICROCYTES BLD QL SMEAR: PRESENT
MONOCYTES # BLD AUTO: 0.3 10E9/L (ref 0–1.3)
MONOCYTES NFR BLD AUTO: 2.6 %
MYELOCYTES # BLD: 0.1 10E9/L
MYELOCYTES NFR BLD MANUAL: 0.9 %
NEUTROPHILS # BLD AUTO: 9.3 10E9/L (ref 1.6–8.3)
NEUTROPHILS NFR BLD AUTO: 89.5 %
NRBC # BLD AUTO: 0.1 10*3/UL
NRBC BLD AUTO-RTO: 1 /100
OVALOCYTES BLD QL SMEAR: SLIGHT
PHOSPHATE SERPL-MCNC: 3 MG/DL (ref 2.5–4.5)
PLATELET # BLD AUTO: 229 10E9/L (ref 150–450)
POIKILOCYTOSIS BLD QL SMEAR: ABNORMAL
POLYCHROMASIA BLD QL SMEAR: SLIGHT
POTASSIUM SERPL-SCNC: 3.8 MMOL/L (ref 3.4–5.3)
PROT SERPL-MCNC: 5.7 G/DL (ref 6.8–8.8)
RBC # BLD AUTO: 3.9 10E12/L (ref 3.8–5.2)
RETICS # AUTO: 148.5 10E9/L (ref 25–95)
RETICS/RBC NFR AUTO: 3.8 % (ref 0.5–2)
SODIUM SERPL-SCNC: 140 MMOL/L (ref 133–144)
URATE SERPL-MCNC: 6.3 MG/DL (ref 2.6–6)
WBC # BLD AUTO: 10.4 10E9/L (ref 4–11)

## 2017-10-27 PROCEDURE — 99213 OFFICE O/P EST LOW 20 MIN: CPT | Mod: ZF

## 2017-10-27 PROCEDURE — 25000132 ZZH RX MED GY IP 250 OP 250 PS 637: Performed by: PHYSICIAN ASSISTANT

## 2017-10-27 PROCEDURE — 25000125 ZZHC RX 250: Performed by: PHYSICIAN ASSISTANT

## 2017-10-27 PROCEDURE — 80053 COMPREHEN METABOLIC PANEL: CPT | Performed by: PHYSICIAN ASSISTANT

## 2017-10-27 PROCEDURE — 25000131 ZZH RX MED GY IP 250 OP 636 PS 637: Performed by: PHYSICIAN ASSISTANT

## 2017-10-27 PROCEDURE — 12000008 ZZH R&B INTERMEDIATE UMMC

## 2017-10-27 PROCEDURE — 25000128 H RX IP 250 OP 636: Performed by: PHYSICIAN ASSISTANT

## 2017-10-27 PROCEDURE — 009U3ZX DRAINAGE OF SPINAL CANAL, PERCUTANEOUS APPROACH, DIAGNOSTIC: ICD-10-PCS | Performed by: RADIOLOGY

## 2017-10-27 PROCEDURE — 85045 AUTOMATED RETICULOCYTE COUNT: CPT | Performed by: PHYSICIAN ASSISTANT

## 2017-10-27 PROCEDURE — 71260 CT THORAX DX C+: CPT

## 2017-10-27 PROCEDURE — 25000128 H RX IP 250 OP 636: Mod: ZF | Performed by: PHYSICIAN ASSISTANT

## 2017-10-27 PROCEDURE — 84550 ASSAY OF BLOOD/URIC ACID: CPT | Performed by: PHYSICIAN ASSISTANT

## 2017-10-27 PROCEDURE — 3E0R305 INTRODUCTION OF OTHER ANTINEOPLASTIC INTO SPINAL CANAL, PERCUTANEOUS APPROACH: ICD-10-PCS | Performed by: INTERNAL MEDICINE

## 2017-10-27 PROCEDURE — 25000128 H RX IP 250 OP 636: Performed by: GENERAL PRACTICE

## 2017-10-27 PROCEDURE — 93005 ELECTROCARDIOGRAM TRACING: CPT

## 2017-10-27 PROCEDURE — 84100 ASSAY OF PHOSPHORUS: CPT | Performed by: PHYSICIAN ASSISTANT

## 2017-10-27 PROCEDURE — 85025 COMPLETE CBC W/AUTO DIFF WBC: CPT | Performed by: PHYSICIAN ASSISTANT

## 2017-10-27 PROCEDURE — 83615 LACTATE (LD) (LDH) ENZYME: CPT | Performed by: PHYSICIAN ASSISTANT

## 2017-10-27 PROCEDURE — 74177 CT ABD & PELVIS W/CONTRAST: CPT

## 2017-10-27 PROCEDURE — 99214 OFFICE O/P EST MOD 30 MIN: CPT | Mod: ZP | Performed by: PHYSICIAN ASSISTANT

## 2017-10-27 PROCEDURE — 99223 1ST HOSP IP/OBS HIGH 75: CPT | Performed by: INTERNAL MEDICINE

## 2017-10-27 PROCEDURE — 93010 ELECTROCARDIOGRAM REPORT: CPT | Performed by: INTERNAL MEDICINE

## 2017-10-27 RX ORDER — OXYCODONE HYDROCHLORIDE 30 MG/1
30 TABLET ORAL EVERY 4 HOURS PRN
Status: DISCONTINUED | OUTPATIENT
Start: 2017-10-27 | End: 2017-10-31 | Stop reason: HOSPADM

## 2017-10-27 RX ORDER — LORAZEPAM 0.5 MG/1
.5-1 TABLET ORAL EVERY 6 HOURS PRN
Status: DISCONTINUED | OUTPATIENT
Start: 2017-10-27 | End: 2017-10-31 | Stop reason: HOSPADM

## 2017-10-27 RX ORDER — POTASSIUM CHLORIDE 750 MG/1
20 TABLET, EXTENDED RELEASE ORAL DAILY
Status: DISCONTINUED | OUTPATIENT
Start: 2017-10-27 | End: 2017-10-31 | Stop reason: HOSPADM

## 2017-10-27 RX ORDER — ALBUTEROL SULFATE 90 UG/1
2 AEROSOL, METERED RESPIRATORY (INHALATION) EVERY 4 HOURS PRN
Status: DISCONTINUED | OUTPATIENT
Start: 2017-10-27 | End: 2017-10-31 | Stop reason: HOSPADM

## 2017-10-27 RX ORDER — SUMATRIPTAN 50 MG/1
50 TABLET, FILM COATED ORAL
Status: DISCONTINUED | OUTPATIENT
Start: 2017-10-27 | End: 2017-10-31 | Stop reason: HOSPADM

## 2017-10-27 RX ORDER — DIPHENHYDRAMINE HCL 50 MG
50 CAPSULE ORAL
Status: DISCONTINUED | OUTPATIENT
Start: 2017-10-27 | End: 2017-10-31 | Stop reason: HOSPADM

## 2017-10-27 RX ORDER — DEXAMETHASONE 4 MG/1
8 TABLET ORAL DAILY
Status: DISCONTINUED | OUTPATIENT
Start: 2017-11-01 | End: 2017-10-31 | Stop reason: HOSPADM

## 2017-10-27 RX ORDER — CALCITRIOL 0.25 UG/1
0.25 CAPSULE, LIQUID FILLED ORAL DAILY
Status: DISCONTINUED | OUTPATIENT
Start: 2017-10-27 | End: 2017-10-27

## 2017-10-27 RX ORDER — IOPAMIDOL 755 MG/ML
115 INJECTION, SOLUTION INTRAVASCULAR ONCE
Status: COMPLETED | OUTPATIENT
Start: 2017-10-27 | End: 2017-10-27

## 2017-10-27 RX ORDER — ACETAMINOPHEN 325 MG/1
650 TABLET ORAL ONCE
Status: CANCELLED
Start: 2017-10-27

## 2017-10-27 RX ORDER — CALCIUM CARBONATE 500 MG/1
1-2 TABLET, CHEWABLE ORAL
Status: DISCONTINUED | OUTPATIENT
Start: 2017-10-27 | End: 2017-10-31 | Stop reason: HOSPADM

## 2017-10-27 RX ORDER — ALBUTEROL SULFATE 90 UG/1
1-2 AEROSOL, METERED RESPIRATORY (INHALATION)
Status: DISCONTINUED | OUTPATIENT
Start: 2017-10-27 | End: 2017-10-31 | Stop reason: HOSPADM

## 2017-10-27 RX ORDER — LEVOTHYROXINE SODIUM 112 UG/1
224 TABLET ORAL
Status: DISCONTINUED | OUTPATIENT
Start: 2017-10-28 | End: 2017-10-31 | Stop reason: HOSPADM

## 2017-10-27 RX ORDER — ALBUTEROL SULFATE 0.83 MG/ML
2.5 SOLUTION RESPIRATORY (INHALATION)
Status: CANCELLED | OUTPATIENT
Start: 2017-10-27

## 2017-10-27 RX ORDER — LORAZEPAM 2 MG/ML
.5-1 INJECTION INTRAMUSCULAR EVERY 6 HOURS PRN
Status: CANCELLED | OUTPATIENT
Start: 2017-10-27

## 2017-10-27 RX ORDER — LIDOCAINE 50 MG/G
OINTMENT TOPICAL 4 TIMES DAILY PRN
Status: DISCONTINUED | OUTPATIENT
Start: 2017-10-27 | End: 2017-10-31 | Stop reason: HOSPADM

## 2017-10-27 RX ORDER — FERROUS SULFATE 325(65) MG
325 TABLET ORAL
Status: DISCONTINUED | OUTPATIENT
Start: 2017-10-27 | End: 2017-10-31 | Stop reason: HOSPADM

## 2017-10-27 RX ORDER — POLYETHYLENE GLYCOL 3350 17 G/17G
17 POWDER, FOR SOLUTION ORAL AT BEDTIME
Status: DISCONTINUED | OUTPATIENT
Start: 2017-10-27 | End: 2017-10-31 | Stop reason: HOSPADM

## 2017-10-27 RX ORDER — MEPERIDINE HYDROCHLORIDE 50 MG/ML
25 INJECTION INTRAMUSCULAR; INTRAVENOUS; SUBCUTANEOUS EVERY 30 MIN PRN
Status: DISCONTINUED | OUTPATIENT
Start: 2017-10-27 | End: 2017-10-31 | Stop reason: HOSPADM

## 2017-10-27 RX ORDER — ACETAMINOPHEN 325 MG/1
650 TABLET ORAL ONCE
Status: COMPLETED | OUTPATIENT
Start: 2017-10-27 | End: 2017-10-27

## 2017-10-27 RX ORDER — MEPERIDINE HYDROCHLORIDE 25 MG/ML
25 INJECTION INTRAMUSCULAR; INTRAVENOUS; SUBCUTANEOUS EVERY 30 MIN PRN
Status: CANCELLED | OUTPATIENT
Start: 2017-10-27

## 2017-10-27 RX ORDER — DOCUSATE SODIUM 100 MG/1
100 CAPSULE, LIQUID FILLED ORAL AT BEDTIME
Status: DISCONTINUED | OUTPATIENT
Start: 2017-10-27 | End: 2017-10-31 | Stop reason: HOSPADM

## 2017-10-27 RX ORDER — MONTELUKAST SODIUM 10 MG/1
20 TABLET ORAL 2 TIMES DAILY
Status: DISCONTINUED | OUTPATIENT
Start: 2017-10-27 | End: 2017-10-31 | Stop reason: HOSPADM

## 2017-10-27 RX ORDER — AMOXICILLIN 250 MG
2 CAPSULE ORAL 2 TIMES DAILY
Status: DISCONTINUED | OUTPATIENT
Start: 2017-10-27 | End: 2017-10-27

## 2017-10-27 RX ORDER — ACYCLOVIR 400 MG/1
400 TABLET ORAL 2 TIMES DAILY
Status: DISCONTINUED | OUTPATIENT
Start: 2017-10-27 | End: 2017-10-31 | Stop reason: HOSPADM

## 2017-10-27 RX ORDER — POLYETHYLENE GLYCOL 3350 17 G/17G
17 POWDER, FOR SOLUTION ORAL DAILY PRN
Status: DISCONTINUED | OUTPATIENT
Start: 2017-10-27 | End: 2017-10-31 | Stop reason: HOSPADM

## 2017-10-27 RX ORDER — PROCHLORPERAZINE MALEATE 10 MG
10 TABLET ORAL EVERY 6 HOURS PRN
Status: DISCONTINUED | OUTPATIENT
Start: 2017-10-27 | End: 2017-10-31 | Stop reason: HOSPADM

## 2017-10-27 RX ORDER — PROCHLORPERAZINE MALEATE 10 MG
10 TABLET ORAL EVERY 6 HOURS PRN
Status: CANCELLED
Start: 2017-10-27

## 2017-10-27 RX ORDER — CROMOLYN SODIUM 40 MG/ML
1 SOLUTION/ DROPS OPHTHALMIC 4 TIMES DAILY
Status: DISCONTINUED | OUTPATIENT
Start: 2017-10-27 | End: 2017-10-31 | Stop reason: HOSPADM

## 2017-10-27 RX ORDER — METHYLPREDNISOLONE SODIUM SUCCINATE 125 MG/2ML
125 INJECTION, POWDER, LYOPHILIZED, FOR SOLUTION INTRAMUSCULAR; INTRAVENOUS
Status: DISCONTINUED | OUTPATIENT
Start: 2017-10-27 | End: 2017-10-31 | Stop reason: HOSPADM

## 2017-10-27 RX ORDER — ALLOPURINOL 300 MG/1
300 TABLET ORAL DAILY
Status: CANCELLED | OUTPATIENT
Start: 2017-10-27

## 2017-10-27 RX ORDER — FERROUS SULFATE 325(65) MG
TABLET ORAL
Status: ON HOLD | COMMUNITY
End: 2017-10-31

## 2017-10-27 RX ORDER — CETIRIZINE HYDROCHLORIDE 10 MG/1
10 TABLET ORAL 3 TIMES DAILY
Status: DISCONTINUED | OUTPATIENT
Start: 2017-10-27 | End: 2017-10-31 | Stop reason: HOSPADM

## 2017-10-27 RX ORDER — HEPARIN SODIUM (PORCINE) LOCK FLUSH IV SOLN 100 UNIT/ML 100 UNIT/ML
5 SOLUTION INTRAVENOUS ONCE
Status: COMPLETED | OUTPATIENT
Start: 2017-10-27 | End: 2017-10-27

## 2017-10-27 RX ORDER — CELECOXIB 200 MG/1
200 CAPSULE ORAL 2 TIMES DAILY
Status: DISCONTINUED | OUTPATIENT
Start: 2017-10-27 | End: 2017-10-31 | Stop reason: HOSPADM

## 2017-10-27 RX ORDER — DIAZEPAM 5 MG
5 TABLET ORAL 3 TIMES DAILY PRN
Status: DISCONTINUED | OUTPATIENT
Start: 2017-10-27 | End: 2017-10-29

## 2017-10-27 RX ORDER — TRIAMCINOLONE ACETONIDE 0.25 MG/G
OINTMENT TOPICAL 2 TIMES DAILY
Status: DISCONTINUED | OUTPATIENT
Start: 2017-10-27 | End: 2017-10-31 | Stop reason: HOSPADM

## 2017-10-27 RX ORDER — CALCITRIOL 0.5 UG/1
0.5 CAPSULE, LIQUID FILLED ORAL DAILY
Status: DISCONTINUED | OUTPATIENT
Start: 2017-10-28 | End: 2017-10-31 | Stop reason: HOSPADM

## 2017-10-27 RX ORDER — ALBUTEROL SULFATE 90 UG/1
1-2 AEROSOL, METERED RESPIRATORY (INHALATION)
Status: CANCELLED
Start: 2017-10-27

## 2017-10-27 RX ORDER — CYCLOBENZAPRINE HCL 10 MG
10 TABLET ORAL 3 TIMES DAILY PRN
Status: DISCONTINUED | OUTPATIENT
Start: 2017-10-27 | End: 2017-10-31 | Stop reason: HOSPADM

## 2017-10-27 RX ORDER — SODIUM CHLORIDE 9 MG/ML
1000 INJECTION, SOLUTION INTRAVENOUS CONTINUOUS PRN
Status: DISCONTINUED | OUTPATIENT
Start: 2017-10-27 | End: 2017-10-31 | Stop reason: HOSPADM

## 2017-10-27 RX ORDER — AMOXICILLIN 250 MG
1-2 CAPSULE ORAL 2 TIMES DAILY
Status: DISCONTINUED | OUTPATIENT
Start: 2017-10-27 | End: 2017-10-27

## 2017-10-27 RX ORDER — MORPHINE SULFATE 60 MG/1
60 TABLET, FILM COATED, EXTENDED RELEASE ORAL DAILY
Status: DISCONTINUED | OUTPATIENT
Start: 2017-10-27 | End: 2017-10-31 | Stop reason: HOSPADM

## 2017-10-27 RX ORDER — NALOXONE HYDROCHLORIDE 0.4 MG/ML
.1-.4 INJECTION, SOLUTION INTRAMUSCULAR; INTRAVENOUS; SUBCUTANEOUS
Status: DISCONTINUED | OUTPATIENT
Start: 2017-10-27 | End: 2017-10-31 | Stop reason: HOSPADM

## 2017-10-27 RX ORDER — HYDROCHLOROTHIAZIDE 12.5 MG/1
12.5 CAPSULE ORAL DAILY
Status: DISCONTINUED | OUTPATIENT
Start: 2017-10-27 | End: 2017-10-31 | Stop reason: HOSPADM

## 2017-10-27 RX ORDER — ALBUTEROL SULFATE 0.83 MG/ML
2.5 SOLUTION RESPIRATORY (INHALATION)
Status: DISCONTINUED | OUTPATIENT
Start: 2017-10-27 | End: 2017-10-31 | Stop reason: HOSPADM

## 2017-10-27 RX ORDER — DIPHENHYDRAMINE HYDROCHLORIDE 50 MG/ML
50 INJECTION INTRAMUSCULAR; INTRAVENOUS
Status: CANCELLED
Start: 2017-10-27

## 2017-10-27 RX ORDER — AMOXICILLIN 250 MG
2 CAPSULE ORAL 2 TIMES DAILY
Status: CANCELLED | OUTPATIENT
Start: 2017-10-27

## 2017-10-27 RX ORDER — EPINEPHRINE 1 MG/ML
0.3 INJECTION, SOLUTION, CONCENTRATE INTRAVENOUS EVERY 5 MIN PRN
Status: CANCELLED | OUTPATIENT
Start: 2017-10-27

## 2017-10-27 RX ORDER — LORAZEPAM 0.5 MG/1
.5-1 TABLET ORAL EVERY 6 HOURS PRN
Status: CANCELLED
Start: 2017-10-27

## 2017-10-27 RX ORDER — EPINEPHRINE 1 MG/ML
0.3 INJECTION, SOLUTION, CONCENTRATE INTRAVENOUS EVERY 5 MIN PRN
Status: DISCONTINUED | OUTPATIENT
Start: 2017-10-27 | End: 2017-10-31 | Stop reason: HOSPADM

## 2017-10-27 RX ORDER — ALLOPURINOL 300 MG/1
300 TABLET ORAL DAILY
Status: DISCONTINUED | OUTPATIENT
Start: 2017-10-27 | End: 2017-10-27

## 2017-10-27 RX ORDER — DIPHENHYDRAMINE HYDROCHLORIDE AND LIDOCAINE HYDROCHLORIDE AND ALUMINUM HYDROXIDE AND MAGNESIUM HYDRO
10 KIT EVERY 6 HOURS PRN
Status: DISCONTINUED | OUTPATIENT
Start: 2017-10-27 | End: 2017-10-31 | Stop reason: HOSPADM

## 2017-10-27 RX ORDER — ONDANSETRON 8 MG/1
16 TABLET, FILM COATED ORAL EVERY 24 HOURS
Status: CANCELLED
Start: 2017-10-28

## 2017-10-27 RX ORDER — METHOCARBAMOL 750 MG/1
750 TABLET, FILM COATED ORAL 4 TIMES DAILY PRN
Status: DISCONTINUED | OUTPATIENT
Start: 2017-10-27 | End: 2017-10-27

## 2017-10-27 RX ORDER — LIDOCAINE/PRILOCAINE 2.5 %-2.5%
CREAM (GRAM) TOPICAL PRN
Qty: 30 G | Refills: 1 | Status: SHIPPED | OUTPATIENT
Start: 2017-10-27 | End: 2018-01-03

## 2017-10-27 RX ORDER — LEVOFLOXACIN 250 MG/1
250 TABLET, FILM COATED ORAL DAILY
Status: DISCONTINUED | OUTPATIENT
Start: 2017-10-27 | End: 2017-10-27

## 2017-10-27 RX ORDER — METHYLPREDNISOLONE SODIUM SUCCINATE 125 MG/2ML
125 INJECTION, POWDER, LYOPHILIZED, FOR SOLUTION INTRAMUSCULAR; INTRAVENOUS
Status: CANCELLED
Start: 2017-10-27

## 2017-10-27 RX ORDER — DIPHENHYDRAMINE HCL 25 MG
50 CAPSULE ORAL ONCE
Status: CANCELLED
Start: 2017-10-27

## 2017-10-27 RX ORDER — SODIUM CHLORIDE 9 MG/ML
1000 INJECTION, SOLUTION INTRAVENOUS CONTINUOUS PRN
Status: CANCELLED
Start: 2017-10-27

## 2017-10-27 RX ORDER — DIPHENHYDRAMINE HCL 50 MG
50 CAPSULE ORAL ONCE
Status: COMPLETED | OUTPATIENT
Start: 2017-10-27 | End: 2017-10-27

## 2017-10-27 RX ORDER — MORPHINE SULFATE 30 MG/1
30 TABLET, FILM COATED, EXTENDED RELEASE ORAL 2 TIMES DAILY
Status: DISCONTINUED | OUTPATIENT
Start: 2017-10-27 | End: 2017-10-31 | Stop reason: HOSPADM

## 2017-10-27 RX ORDER — ALLOPURINOL 300 MG/1
300 TABLET ORAL DAILY
Status: DISCONTINUED | OUTPATIENT
Start: 2017-10-28 | End: 2017-10-31 | Stop reason: HOSPADM

## 2017-10-27 RX ORDER — DEXAMETHASONE 4 MG/1
8 TABLET ORAL DAILY
Status: CANCELLED
Start: 2017-11-02

## 2017-10-27 RX ORDER — MORPHINE SULFATE 30 MG/1
30 TABLET, FILM COATED, EXTENDED RELEASE ORAL EVERY 8 HOURS
Qty: 120 TABLET | Refills: 0 | Status: SHIPPED | OUTPATIENT
Start: 2017-11-02 | End: 2017-11-24

## 2017-10-27 RX ORDER — FLUCONAZOLE 200 MG/1
200 TABLET ORAL DAILY
Status: DISCONTINUED | OUTPATIENT
Start: 2017-10-27 | End: 2017-10-31 | Stop reason: HOSPADM

## 2017-10-27 RX ORDER — FUROSEMIDE 20 MG
20 TABLET ORAL DAILY
Status: DISCONTINUED | OUTPATIENT
Start: 2017-10-28 | End: 2017-10-28

## 2017-10-27 RX ORDER — LORAZEPAM 2 MG/ML
.5-1 INJECTION INTRAMUSCULAR EVERY 6 HOURS PRN
Status: DISCONTINUED | OUTPATIENT
Start: 2017-10-27 | End: 2017-10-31 | Stop reason: HOSPADM

## 2017-10-27 RX ORDER — OXYCODONE HYDROCHLORIDE 30 MG/1
30 TABLET ORAL EVERY 4 HOURS PRN
Qty: 180 TABLET | Refills: 0 | Status: SHIPPED | OUTPATIENT
Start: 2017-10-27 | End: 2017-11-24

## 2017-10-27 RX ORDER — DIPHENHYDRAMINE HYDROCHLORIDE 50 MG/ML
50 INJECTION INTRAMUSCULAR; INTRAVENOUS
Status: DISCONTINUED | OUTPATIENT
Start: 2017-10-27 | End: 2017-10-31 | Stop reason: HOSPADM

## 2017-10-27 RX ORDER — LEVOTHYROXINE SODIUM 112 UG/1
112 TABLET ORAL WEEKLY
Status: DISCONTINUED | OUTPATIENT
Start: 2017-10-29 | End: 2017-10-31 | Stop reason: HOSPADM

## 2017-10-27 RX ORDER — ONDANSETRON 8 MG/1
16 TABLET, FILM COATED ORAL EVERY 24 HOURS
Status: DISCONTINUED | OUTPATIENT
Start: 2017-10-27 | End: 2017-10-31 | Stop reason: HOSPADM

## 2017-10-27 RX ORDER — METHOCARBAMOL 750 MG/1
750 TABLET, FILM COATED ORAL 4 TIMES DAILY
Status: DISCONTINUED | OUTPATIENT
Start: 2017-10-27 | End: 2017-10-31 | Stop reason: HOSPADM

## 2017-10-27 RX ORDER — FERROUS SULFATE 325(65) MG
325 TABLET ORAL
Qty: 100 TABLET | Refills: 0 | COMMUNITY
Start: 2017-10-27 | End: 2017-10-30

## 2017-10-27 RX ADMIN — POTASSIUM CHLORIDE 20 MEQ: 750 TABLET, EXTENDED RELEASE ORAL at 14:10

## 2017-10-27 RX ADMIN — FERROUS SULFATE TAB 325 MG (65 MG ELEMENTAL FE) 325 MG: 325 (65 FE) TAB at 19:28

## 2017-10-27 RX ADMIN — PREDNISONE 60 MG: 50 TABLET ORAL at 20:05

## 2017-10-27 RX ADMIN — CROMOLYN SODIUM 1 DROP: 40 SOLUTION/ DROPS OPHTHALMIC at 19:30

## 2017-10-27 RX ADMIN — RITUXIMAB 750 MG: 10 INJECTION, SOLUTION INTRAVENOUS at 14:01

## 2017-10-27 RX ADMIN — HYDROCHLOROTHIAZIDE 12.5 MG: 12.5 CAPSULE ORAL at 14:11

## 2017-10-27 RX ADMIN — CELECOXIB 200 MG: 200 CAPSULE ORAL at 19:28

## 2017-10-27 RX ADMIN — ENOXAPARIN SODIUM 40 MG: 40 INJECTION SUBCUTANEOUS at 19:30

## 2017-10-27 RX ADMIN — ACYCLOVIR 400 MG: 400 TABLET ORAL at 19:29

## 2017-10-27 RX ADMIN — CROMOLYN SODIUM 1 DROP: 40 SOLUTION/ DROPS OPHTHALMIC at 16:16

## 2017-10-27 RX ADMIN — LIDOCAINE: 50 OINTMENT TOPICAL at 21:45

## 2017-10-27 RX ADMIN — RANITIDINE 75 MG: 75 TABLET, FILM COATED ORAL at 19:27

## 2017-10-27 RX ADMIN — DIAZEPAM 5 MG: 5 TABLET ORAL at 16:35

## 2017-10-27 RX ADMIN — POLYETHYLENE GLYCOL 3350 17 G: 17 POWDER, FOR SOLUTION ORAL at 21:44

## 2017-10-27 RX ADMIN — RANITIDINE 75 MG: 75 TABLET, FILM COATED ORAL at 14:10

## 2017-10-27 RX ADMIN — DOXORUBICIN HYDROCHLORIDE 0.8 MG: 2 INJECTION, SOLUTION INTRAVENOUS at 20:55

## 2017-10-27 RX ADMIN — CETIRIZINE HYDROCHLORIDE 10 MG: 10 TABLET, FILM COATED ORAL at 19:29

## 2017-10-27 RX ADMIN — ONDANSETRON HYDROCHLORIDE 16 MG: 8 TABLET, FILM COATED ORAL at 20:05

## 2017-10-27 RX ADMIN — Medication 2 TABLET: at 14:10

## 2017-10-27 RX ADMIN — SODIUM CHLORIDE, PRESERVATIVE FREE 5 ML: 5 INJECTION INTRAVENOUS at 06:52

## 2017-10-27 RX ADMIN — METHOCARBAMOL 750 MG: 750 TABLET ORAL at 17:29

## 2017-10-27 RX ADMIN — DIPHENHYDRAMINE HYDROCHLORIDE 50 MG: 50 CAPSULE ORAL at 12:14

## 2017-10-27 RX ADMIN — MORPHINE SULFATE 60 MG: 60 TABLET, EXTENDED RELEASE ORAL at 19:57

## 2017-10-27 RX ADMIN — METHOCARBAMOL 750 MG: 750 TABLET ORAL at 19:27

## 2017-10-27 RX ADMIN — FLUCONAZOLE 200 MG: 200 TABLET ORAL at 19:28

## 2017-10-27 RX ADMIN — MORPHINE SULFATE 30 MG: 30 TABLET, EXTENDED RELEASE ORAL at 14:13

## 2017-10-27 RX ADMIN — DOCUSATE SODIUM 100 MG: 100 CAPSULE, LIQUID FILLED ORAL at 19:28

## 2017-10-27 RX ADMIN — OXYCODONE HYDROCHLORIDE 30 MG: 30 TABLET ORAL at 16:18

## 2017-10-27 RX ADMIN — MONTELUKAST SODIUM 20 MG: 10 TABLET, FILM COATED ORAL at 19:28

## 2017-10-27 RX ADMIN — OXYCODONE HYDROCHLORIDE 30 MG: 30 TABLET ORAL at 20:26

## 2017-10-27 RX ADMIN — ETOPOSIDE 100 MG: 20 INJECTION, SOLUTION, CONCENTRATE INTRAVENOUS at 20:52

## 2017-10-27 RX ADMIN — ACETAMINOPHEN 650 MG: 325 TABLET, FILM COATED ORAL at 12:14

## 2017-10-27 RX ADMIN — IOPAMIDOL 115 ML: 755 INJECTION, SOLUTION INTRAVENOUS at 18:21

## 2017-10-27 ASSESSMENT — PAIN SCALES - GENERAL
PAINLEVEL: EXTREME PAIN (8)
PAINLEVEL: EXTREME PAIN (8)

## 2017-10-27 ASSESSMENT — PAIN DESCRIPTION - DESCRIPTORS: DESCRIPTORS: ACHING;JABBING

## 2017-10-27 NOTE — PROGRESS NOTES
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 She is currently undergoing treatment with DA-R-EPOCH. Her past medical history is significant for breast cancer in 2011 s/p bilateral mastectomies and BENJIE/BSO up until recently on Tamoxifen, papillary thyroid cancer s/p total thyroidectomy, chronic chest wall pain, and asthma. Briefly, her oncologic history is as follows:    She presented in 8/2017 with dramatically worse chest and back pain. CT 9/1/17 showed lytic lesion right 10th rib extending to right T9-10 neural foramen with vertebral body invasion. There was associated conglomerate of lymphadenopathy around the mid T-spine. She had a CT guided biopsy showing B-cell high grade lymphoma. Pathology showed \"The morphology shows a population of large cells, diffuse lymphoid infiltrate. The cells are atypical with abundant mitotic and apoptotic activity and single cell necrosis. Tumor is CD45 and CD79a positive and focally weakly CD30 positive. The other stains revealed positivity for CD20, BCL6, BCL2 and MUM1, and CD10 and CD21 negative. The CD5 and CD3 highlighted background T-cells.  MYC expression was equivocal with approximately 40% nuclei staining.  Ki-67 proliferative index is greater than 90-95%. The immunohistochemistry is suggestive of non-GCB immunophenotype of diffuse large B-cell lymphoma. The cytogenetics did not show rearrangement of the BCL2 or c-MYC. There is the presence of BCL6 rearrangement in 78% of cells and gains of MYC and BCL2, but no amplifications.\"     Staging LP and BMBx were negative for lymphoma. She started DA-REPOCH 10/6/17. She did well with chemo other than rigors during CIVI. D/c 10/11/17. Given Neulasta 10/12/17. Post cycle 1 complicated by mucositis and worsening of chronic LE peripheral edema. Presents today in routine " "follow-up prior to cycle 2.    Interval History:  Mrs. Arias returns today with her dad. She states that overall things have gone OK since last week. She states the leg swelling has improved with the 20mg daily of Lasix, though it is still an issue. The pain has been more manageable with the decreased swelling. She is wondering if we could increase her Lasix after this hospital stay.    She also has a new complaint of full body tremor/shaking that she decribes as an internal \"spinning top.\" She states she feels like her body is constantly shaking and she notes a tremor when she extends her arms. She states this started during the last chemo infusions and has persisted. She denies any headaches, though does feel her vision is more blurry than before. No issues with speech, walking, or balance. She denies peripheral neuropathy.     She continues to have her chronic chest wall and back pain, for which she follows with Dr. Benitez. She states her chest tightness and SOB sensation improved with the dexamethasone and she is off of the steroids now. Her mucositis has greatly improved and she continues to use salt and soda swishes.     During our visit patient had questions regarding her prognosis, specifically whether or not her lymphoma was curable. She also notes significant pain with her last LP, even thought the first was tolerated well. She continues to take all of her ppx medications.       Review of Systems:  Patient denies fevers, chills, night sweats, unexplained weight changes, headaches, new lumps or bumps, shortness of breath, cough, abdominal pain, nausea, vomiting, changes to bowel or bladder, bleeding issues, or rash.    Current Outpatient Prescriptions   Medication Sig Dispense Refill     SUMAtriptan (IMITREX) 50 MG tablet Take 1 tablet (50 mg) by mouth at onset of headache for migraine (may repeat in 2 hours as needed, max 2 tablets daily) 9 tablet 3     calcitRIOL (ROCALTROL) 0.25 MCG capsule TAKE 2 " CAPSULES BY MOUTH EVERY MORNING AND TAKE 1 CAPSULE BY MOUTH EVERY NIGHT AT BEDTIME 270 capsule 3     furosemide (LASIX) 20 MG tablet Take 1 tablet (20 mg) by mouth daily 14 tablet 3     celecoxib (CELEBREX) 200 MG capsule TAKE ONE CAPSULE BY MOUTH TWICE DAILY  56 capsule 0     dexamethasone (DECADRON) 4 MG tablet Take 1 tablet (4 mg) by mouth 2 times daily (with meals) Or as directed by MD 60 tablet 0     allopurinol (ZYLOPRIM) 300 MG tablet Take 1 tablet (300 mg) by mouth daily 30 tablet 0     fluconazole (DIFLUCAN) 200 MG tablet Take 1 tablet (200 mg) by mouth daily 30 tablet 0     acyclovir (ZOVIRAX) 400 MG tablet Take 1 tablet (400 mg) by mouth 2 times daily 30 tablet 0     levofloxacin (LEVAQUIN) 250 MG tablet Take 1 tablet (250 mg) by mouth daily 30 tablet 0     order for DME Compression stockings, medium grade, please measure and fit. Please dispense a pair. 1 Units 0     magic mouthwash suspension (diphenhydrAMINE, lidocaine, aluminum-magnesium & simethicone) Swish and spit 10 mLs in mouth every 6 hours as needed for mouth sores 360 mL 1     oxyCODONE (ROXICODONE) 30 MG IR tablet Take 1 tablet (30 mg) by mouth every 4 hours as needed for moderate to severe pain 60 tablet 0     cetirizine (ZYRTEC ALLERGY) 10 MG tablet Take 1 tablet (10 mg) by mouth 3 times daily On hold for lab test. 30 tablet 0     diphenhydrAMINE (BENADRYL) 50 MG capsule Take 1 capsule (50 mg) by mouth nightly as needed for itching or sleep 56 capsule      cyclobenzaprine (FLEXERIL) 10 MG tablet Take 1 tablet (10 mg) by mouth 3 times daily as needed On hold Dr Monsalve 42 tablet 5     methocarbamol (ROBAXIN) 750 MG tablet Take 1 tablet (750 mg) by mouth 4 times daily as needed . Ok to take a 5th Robaxin on very severe days. 360 tablet 1     ondansetron (ZOFRAN-ODT) 8 MG ODT tab Take 1 tablet (8 mg) by mouth every 8 hours as needed for nausea or vomiting 90 tablet 3     COMPOUND CONTAINING CONTROLLED SUBSTANCE (CMPD RX) - PHARMACY TO MIX  COMPOUNDED MEDICATION Apply small amount to affected area two times daily. (Patient not taking: Reported on 10/23/2017) 120 g 6     morphine (MS CONTIN) 30 MG 12 hr tablet Take 1 tablet (30 mg) by mouth every 8 hours . Take an addition pill at night such that your evening dose is 60mg 120 tablet 0     lidocaine-prilocaine (EMLA) cream Apply topically as needed (port access pain.) 30 g 1     diazepam (VALIUM) 5 MG tablet Take 1 tablet (5 mg) by mouth 3 times daily as needed For muscle spasms (Patient taking differently: Take 5 mg by mouth 3 times daily For muscle spasms) 90 tablet 2     levothyroxine (SYNTHROID/LEVOTHROID) 112 MCG tablet Mon-Sat: (2 tabs daily) Sunday: 3 tabs 189 tablet 3     hydrochlorothiazide (MICROZIDE) 12.5 MG capsule Take 1 capsule (12.5 mg) by mouth daily 90 capsule 2     montelukast (SINGULAIR) 10 MG tablet TAKE TWO TABLETS BY MOUTH TWICE DAILY 120 tablet 11     chlorhexidine (PERIDEX) 0.12 % solution   0     EPINEPHrine (EPIPEN 2-CARIDAD) 0.3 MG/0.3ML injection Inject 0.3 mLs (0.3 mg) into the muscle once as needed for anaphylaxis (Patient not taking: Reported on 10/23/2017) 0.6 mL 3     potassium chloride SA (POTASSIUM CHLORIDE) 20 MEQ CR tablet Take 1 tablet (20 mEq) by mouth daily 90 tablet 3     VENTOLIN  (90 BASE) MCG/ACT Inhaler INHALE 2 PUFFS INTO THE LUNGS EVERY 4 HOURS AS NEEDED FOR SHORTNESS OF BREATH OR DIFFICULT BREATHING OR WHEEZING (Patient not taking: Reported on 10/23/2017) 18 g 3     cromolyn (OPTICROM) 4 % ophthalmic solution Place 1 drop into both eyes 4 times daily 10 mL 5     NASALCROM 5.2 MG/ACT AERS Inhaler SPRAY ONE SPRAY( 1 ML) IN NOSTRIL DAILY (Patient not taking: Reported on 10/23/2017) 1 Bottle 6     Cholecalciferol (VITAMIN D3) 2000 UNITS CAPS Take 10,000 Units by mouth daily 60 capsule 4     lidocaine (XYLOCAINE) 5 % ointment SANJANA TOPICALLY QID PRN 35.44 g 3     order for DME Equipment being ordered: compression stockings. 20-30 mm or 30 - 40 mm as patient can  tolerate. Physical therapy to determine if they should be above or below the knee. 2 Units 4     order for DME Equipment being ordered: compression bra (Patient not taking: Reported on 10/23/2017) 2 Device 1     ranitidine (ZANTAC) 75 MG tablet Take 1 tablet (75 mg) by mouth 3 times daily 270 tablet 3     tamoxifen (NOLVADEX) 20 MG tablet Take 1 tablet (20 mg) by mouth daily (Patient not taking: Reported on 10/16/2017) 90 tablet 3     Lidocaine HCl Monohydrate POWD        aluminum chloride (DRYSOL) 20 % external solution Apply topically At Bedtime (Patient not taking: Reported on 10/23/2017) 60 mL 3     tacrolimus (PROTOPIC) 0.1 % ointment Apply topically 2 times daily (Patient not taking: Reported on 10/23/2017) 60 g 3     triamcinolone (KENALOG) 0.025 % ointment Apply topically 2 times daily Mix with 1 lb jar of vaseline or aquaphor (Patient not taking: Reported on 10/23/2017) 80 g 3     UNABLE TO FIND MEDICATION NAME: Tumeric 3 capsules daily (on hold during chemo)       diclofenac (VOLTAREN) 1 % GEL Apply affected area two times daily PRN using enclosed dosing card. 100 g 1     mometasone (ASMANEX 30 METERED DOSES) 110 MCG/INH inhaler Inhale 1 puff into the lungs daily (Patient not taking: Reported on 10/23/2017) 3 Inhaler 3     UNABLE TO FIND 2 tablets 3 times daily MEDICATION NAME: Natural D-Hist (on hold during chemo)       MAGNESIUM CITRATE PO Take 400 mg by mouth daily       calcium citrate-vitamin D (CALCIUM CITRATE +D) 315-250 MG-UNIT TABS Take 2 tablets by mouth 3 times daily 120 tablet      calcium carbonate (TUMS) 500 MG chewable tablet Take 2 tablets (1,000 mg) by mouth nightly as needed for other (cramps) 180 chew tab 3     UNABLE TO FIND 1 tablet 3 times daily MEDICATION NAME: calcium D-Glucarate       Triamcinolone Acetonide (AZMACORT IN) Inhale 2 puffs into the lungs as needed       UNABLE TO FIND 1 tablet 3 times daily MEDICATION NAME: Digestzymes       UNABLE TO FIND 1 tablet daily MEDICATION  "NAME: Pure Encapsulations       Omega-3 Fatty Acids (OMEGA 3 PO) Take 5 mLs by mouth       Probiotic Product (PROBIOTIC DAILY PO) Take 1 capsule by mouth daily Lacto acid bifidobacterium       polyethylene glycol (MIRALAX) powder Take 17 g by mouth daily (Patient not taking: Reported on 10/23/2017) 510 g 1     docusate sodium 100 MG tablet Take 100 mg by mouth daily 60 tablet 1       Physical Examination:  /81  Pulse 131  Temp 98.5  F (36.9  C) (Oral)  Resp 16  Ht 1.651 m (5' 5\")  Wt 85.1 kg (187 lb 9.8 oz)  SpO2 94%  BMI 31.22 kg/m2  Wt Readings from Last 10 Encounters:   10/23/17 85.9 kg (189 lb 4.8 oz)   10/23/17 85.9 kg (189 lb 4.8 oz)   10/21/17 87.1 kg (192 lb)   10/19/17 87.1 kg (192 lb)   10/16/17 88.2 kg (194 lb 8 oz)   10/11/17 87.9 kg (193 lb 12.6 oz)   10/04/17 87.7 kg (193 lb 6.4 oz)   10/03/17 87.7 kg (193 lb 4.8 oz)   09/25/17 87.6 kg (193 lb 3.2 oz)   09/15/17 87.9 kg (193 lb 12.6 oz)     Constitutional: Well-appearing female in no acute distress.  Eyes: EOMI, PERRL. No scleral icterus.  ENT: Oral mucosa is moist without lesions or thrush.   Lymphatic: Neck is supple without cervical or supraclavicular lymphadenopathy.   Cardiovascular: Regular rate and rhythm. Recheck pulse 104. No murmurs, gallops, or rubs. +1 bilateral pitting peripheral edema.  Respiratory: Clear to auscultation bilaterally. No wheezes or crackles.  Gastrointestinal: Bowel sounds present. Abdomen soft,mildly tender in the RUQ. No palpable hepatosplenomegaly or masses.   Neurologic: Cranial nerves II through XII are intact. Full 5/5 strength throughout upper and lower extremities. Patellar reflexes diminished bilaterally. Able to do finger to nose without difficulty or tremor. Able to do alternating hand movement with clear instructions. Normal gait. Normal speech. Mild intention tremor noted when patient extends arms.  Skin: No rashes, petechiae, or bruising noted on exposed skin.    Laboratory Data:   10/27/2017 " "06:59   Sodium 140   Potassium 3.8   Chloride 102   Carbon Dioxide 32   Urea Nitrogen 20   Creatinine 0.93   GFR Estimate 62   GFR Estimate If Black 75   Calcium 8.0 (L)   Anion Gap 6   Albumin 3.0 (L)   Protein Total 5.7 (L)   Bilirubin Total 0.3   Alkaline Phosphatase 77   ALT 53 (H)   AST 29   Lactate Dehydrogenase 427 (H)   Uric Acid 6.3 (H)   Glucose 112 (H)   WBC 10.4   Hemoglobin 10.0 (L)   Hematocrit 33.6 (L)   Platelet Count 229   RBC Count 3.90   MCV 86   MCH 25.6 (L)   MCHC 29.8 (L)   RDW 19.3 (H)   Diff Method Manual Differential   % Neutrophils 89.5   % Lymphocytes 6.1   % Monocytes 2.6   % Eosinophils 0.0   % Basophils 0.9   % Myelocytes 0.9   Nucleated RBCs 1 (H)   Absolute Neutrophil 9.3 (H)   Absolute Lymphocytes 0.6 (L)   Absolute Monocytes 0.3   Absolute Eosinophils 0.0   Absolute Basophils 0.1   Absolute Myelocytes 0.1 (H)   Absolute Nucleated RBC 0.1   Anisocytosis Slight   Poikilocytosis Moderate   Polychromasia Slight   Teardrop Cells Slight   Ovalocytes Slight   Microcytes Present   Basophilic Stipling Present       Assessment and Plan:    1. DLBCL, \"double-hit\"-like, currently on treatment with DA-R-EPOCH  Cycle 1 overall tolerated well other than mucositis and mild infusion reaction (rigors) to continuous infusion of chemotherapy. This responded well to e-meds and was able to be resumed without difficulty. Due for cycle 2 DA-R-EPOCH today. ANC was <0.5 on one occasion (0.1 on 10/16, day 10)  and platelets >25k throughout. Per treatment guidelines, will do same dose with cycle 2. Plan for restaging following cycle 3 per Dr. Sauceda.   Patient also has multiple questions regarding her prognosis and treatment. Did print her treatment plan for her and will talk to Sohail about her concerns and clarification. Patient had significant pain last LP. Consider pretreating with her ativan or oral pain meds prior to her next LP.     Patient's TLS labs were noted to be elevated after patient was " admitted to the hospital (, Uric Acid 6.3) Calcium and potassium are normal. Called Dr. Sauceda to discuss. She is going to call the inpatient team. Likely need a CT scan to assess for progression.     2. Sensation of body shaking, new  Patient admits to felling as if her whole body is constantly shaking internally and did have a mild intention tremor, though this was not always present throughout the exam. Her neuro exam was normal other than initial difficulty with alternating hand movements that resolved with further instructions. Will discuss with Dr. Sauceda, though it is possible this is anxiety driven given no clear rigors on exam and no other neurologic findings.     3. Heme  Did not have transfusion requirements this cycle. Today, Hgb 10.0, Plt 229, WBC 10.4, ANC 9.3. Continue to monitor and transfuse if Hgb<8 and Plt<10k.    4. ID  ANC today is 9.3. Continue ppx ACV 400mg BID, levaquin 250mg daily, and fluconazole 200mg daily.    5. Chest tightness and sensation of SOB  Thought to be secondary to lymphoma. Dex had improved these symptoms. Per Rashida last note, she was started on a dex taper on 10/23, done now. Continue to monitor for worsening symptoms.    6. Mucositis, resolving  Continue salt/soda and MMW.     7. Lower extremity edema with pain  Chronic condition likely exacerbated by steroids and excessive fluids. US on 10/21 negative for DVT bilaterally. Continue Lasix. Consider increasing after this cycle of chemo given significant swelling last time. Continue pain medications as prescribed by palliative care.    8. History of stage 1, ER/RI+, HER2- breast cancer s/p bilateral mastectomies and BENJIE/BSO  Follows with Dr. Crawley. Oncotype recurrence score 11%. Was initially on Arimidex ([id number]) but changed to Tamoxifen due to arthralgias. Tamoxifen held since 10/5 and continue to hold with chemotherapy. F/u Dr. Crawley 11/27/17    9. History of papillary thyroid cancer, s/p total  thyroidectomy  Follows with Dr. Castro. Has post surgical hypothyroidism. Continue levothyroxine and calcitriol as prescribed.    10. History of Rachael en Y gastric bypass  Continue supplements (calcium citrate-vitamin D, vitamin D3, magnesium citrate). Also has iron deficiency anemia likely from poor absorption and per Dr. Sauceda, she should continue 325mg PO ferrous sulfate TID. Updated her med list to reflect this. She should take stool softeners if issues with iron induced constipation.    11. Chronic chest wall pain s/p mastectomies  Follows with Dr. Benitez. Palliative care to continue to prescribe pain medications. Taking MS Contin, Oxycodone for breakthrough pain, and celebrex.     12. Follow-Up  -11/1: Neulasta and Labs  -11/3: Labs and Jessica  -11/6: Labs  -11/10: Labs and Jessica  -11/13: Labs  -11/17: Labs, Jessica, and Hospital Admission for Cycle 3      Roly Desouza PA-C  [facility]  [address] [place] [address] [phone]    The patient was seen in conjunction with Roly Desouza PA-C who served as a scribe for today's visit. I have reviewed and edited the note and agree with the above findings and plan.  Jessica Cox PA-C

## 2017-10-27 NOTE — Clinical Note
"10/27/2017       RE: Tisha Arias  [address] [place] [address]     Dear Colleague,    Thank you for referring your patient, Tisha Arias, to the [facility]. Please see a copy of my visit note below.    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 She is currently undergoing treatment with DA-R-EPOCH. Her past medical history is significant for breast cancer in 2011 s/p bilateral mastectomies and BENJIE/BSO up until recently on Tamoxifen, papillary thyroid cancer s/p total thyroidectomy, chronic chest wall pain, and asthma. Briefly, her oncologic history is as follows:    She presented in 8/2017 with dramatically worse chest and back pain. CT 9/1/17 showed lytic lesion right 10th rib extending to right T9-10 neural foramen with vertebral body invasion. There was associated conglomerate of lymphadenopathy around the mid T-spine. She had a CT guided biopsy showing B-cell high grade lymphoma. Pathology showed \"The morphology shows a population of large cells, diffuse lymphoid infiltrate. The cells are atypical with abundant mitotic and apoptotic activity and single cell necrosis. Tumor is CD45 and CD79a positive and focally weakly CD30 positive. The other stains revealed positivity for CD20, BCL6, BCL2 and MUM1, and CD10 and CD21 negative. The CD5 and CD3 highlighted background T-cells.  MYC expression was equivocal with approximately 40% nuclei staining.  Ki-67 proliferative index is greater than 90-95%. The immunohistochemistry is suggestive of non-GCB immunophenotype of diffuse large B-cell lymphoma. The cytogenetics did not show rearrangement of the BCL2 or c-MYC. There is the presence of BCL6 rearrangement in 78% of cells and gains of MYC and BCL2, but no amplifications.\"     Staging LP and BMBx were negative " "for lymphoma. She started DA-REPOCH 10/6/17. She did well with chemo other than rigors during CIVI. D/c 10/11/17. Given Neulasta 10/12/17. Post cycle 1 complicated by mucositis and worsening of chronic LE peripheral edema. Presents today in routine follow-up prior to cycle 2.    Interval History:  Mrs. Arias returns today with her dad. She states that overall things have gone OK since last week. She states the leg swelling has improved with the 20mg daily of Lasix, though it is still an issue. The pain has been more manageable with the decreased swelling. She is wondering if we could increase her Lasix after this hospital stay.    She also has a new complaint of full body tremor/shaking that she decribes as an internal \"spinning top.\" She states she feels like her body is constantly shaking and she notes a tremor when she extends her arms. She states this started during the last chemo infusions and has persisted. She denies any headaches, though does feel her vision is more blurry than before. No issues with speech, walking, or balance. She denies peripheral neuropathy.     She continues to have her chronic chest wall and back pain, for which she follows with Dr. Benitez. She states her chest tightness and SOB sensation improved with the dexamethasone and she is off of the steroids now. Her mucositis has greatly improved and she continues to use salt and soda swishes.     During our visit patient had questions regarding her prognosis, specifically whether or not her lymphoma was curable. She also notes significant pain with her last LP, even thought the first was tolerated well. She continues to take all of her ppx medications.       Review of Systems:  Patient denies fevers, chills, night sweats, unexplained weight changes, headaches, new lumps or bumps, shortness of breath, cough, abdominal pain, nausea, vomiting, changes to bowel or bladder, bleeding issues, or rash.    Current Outpatient Prescriptions "   Medication Sig Dispense Refill     SUMAtriptan (IMITREX) 50 MG tablet Take 1 tablet (50 mg) by mouth at onset of headache for migraine (may repeat in 2 hours as needed, max 2 tablets daily) 9 tablet 3     calcitRIOL (ROCALTROL) 0.25 MCG capsule TAKE 2 CAPSULES BY MOUTH EVERY MORNING AND TAKE 1 CAPSULE BY MOUTH EVERY NIGHT AT BEDTIME 270 capsule 3     furosemide (LASIX) 20 MG tablet Take 1 tablet (20 mg) by mouth daily 14 tablet 3     celecoxib (CELEBREX) 200 MG capsule TAKE ONE CAPSULE BY MOUTH TWICE DAILY  56 capsule 0     dexamethasone (DECADRON) 4 MG tablet Take 1 tablet (4 mg) by mouth 2 times daily (with meals) Or as directed by MD 60 tablet 0     allopurinol (ZYLOPRIM) 300 MG tablet Take 1 tablet (300 mg) by mouth daily 30 tablet 0     fluconazole (DIFLUCAN) 200 MG tablet Take 1 tablet (200 mg) by mouth daily 30 tablet 0     acyclovir (ZOVIRAX) 400 MG tablet Take 1 tablet (400 mg) by mouth 2 times daily 30 tablet 0     levofloxacin (LEVAQUIN) 250 MG tablet Take 1 tablet (250 mg) by mouth daily 30 tablet 0     order for DME Compression stockings, medium grade, please measure and fit. Please dispense a pair. 1 Units 0     magic mouthwash suspension (diphenhydrAMINE, lidocaine, aluminum-magnesium & simethicone) Swish and spit 10 mLs in mouth every 6 hours as needed for mouth sores 360 mL 1     oxyCODONE (ROXICODONE) 30 MG IR tablet Take 1 tablet (30 mg) by mouth every 4 hours as needed for moderate to severe pain 60 tablet 0     cetirizine (ZYRTEC ALLERGY) 10 MG tablet Take 1 tablet (10 mg) by mouth 3 times daily On hold for lab test. 30 tablet 0     diphenhydrAMINE (BENADRYL) 50 MG capsule Take 1 capsule (50 mg) by mouth nightly as needed for itching or sleep 56 capsule      cyclobenzaprine (FLEXERIL) 10 MG tablet Take 1 tablet (10 mg) by mouth 3 times daily as needed On hold Dr Monsalve 42 tablet 5     methocarbamol (ROBAXIN) 750 MG tablet Take 1 tablet (750 mg) by mouth 4 times daily as needed . Ok to take a  5th Robaxin on very severe days. 360 tablet 1     ondansetron (ZOFRAN-ODT) 8 MG ODT tab Take 1 tablet (8 mg) by mouth every 8 hours as needed for nausea or vomiting 90 tablet 3     COMPOUND CONTAINING CONTROLLED SUBSTANCE (CMPD RX) - PHARMACY TO MIX COMPOUNDED MEDICATION Apply small amount to affected area two times daily. (Patient not taking: Reported on 10/23/2017) 120 g 6     morphine (MS CONTIN) 30 MG 12 hr tablet Take 1 tablet (30 mg) by mouth every 8 hours . Take an addition pill at night such that your evening dose is 60mg 120 tablet 0     lidocaine-prilocaine (EMLA) cream Apply topically as needed (port access pain.) 30 g 1     diazepam (VALIUM) 5 MG tablet Take 1 tablet (5 mg) by mouth 3 times daily as needed For muscle spasms (Patient taking differently: Take 5 mg by mouth 3 times daily For muscle spasms) 90 tablet 2     levothyroxine (SYNTHROID/LEVOTHROID) 112 MCG tablet Mon-Sat: (2 tabs daily) Sunday: 3 tabs 189 tablet 3     hydrochlorothiazide (MICROZIDE) 12.5 MG capsule Take 1 capsule (12.5 mg) by mouth daily 90 capsule 2     montelukast (SINGULAIR) 10 MG tablet TAKE TWO TABLETS BY MOUTH TWICE DAILY 120 tablet 11     chlorhexidine (PERIDEX) 0.12 % solution   0     EPINEPHrine (EPIPEN 2-CARIDAD) 0.3 MG/0.3ML injection Inject 0.3 mLs (0.3 mg) into the muscle once as needed for anaphylaxis (Patient not taking: Reported on 10/23/2017) 0.6 mL 3     potassium chloride SA (POTASSIUM CHLORIDE) 20 MEQ CR tablet Take 1 tablet (20 mEq) by mouth daily 90 tablet 3     VENTOLIN  (90 BASE) MCG/ACT Inhaler INHALE 2 PUFFS INTO THE LUNGS EVERY 4 HOURS AS NEEDED FOR SHORTNESS OF BREATH OR DIFFICULT BREATHING OR WHEEZING (Patient not taking: Reported on 10/23/2017) 18 g 3     cromolyn (OPTICROM) 4 % ophthalmic solution Place 1 drop into both eyes 4 times daily 10 mL 5     NASALCROM 5.2 MG/ACT AERS Inhaler SPRAY ONE SPRAY( 1 ML) IN NOSTRIL DAILY (Patient not taking: Reported on 10/23/2017) 1 Bottle 6     Cholecalciferol  (VITAMIN D3) 2000 UNITS CAPS Take 10,000 Units by mouth daily 60 capsule 4     lidocaine (XYLOCAINE) 5 % ointment SANJANA TOPICALLY QID PRN 35.44 g 3     order for DME Equipment being ordered: compression stockings. 20-30 mm or 30 - 40 mm as patient can tolerate. Physical therapy to determine if they should be above or below the knee. 2 Units 4     order for DME Equipment being ordered: compression bra (Patient not taking: Reported on 10/23/2017) 2 Device 1     ranitidine (ZANTAC) 75 MG tablet Take 1 tablet (75 mg) by mouth 3 times daily 270 tablet 3     tamoxifen (NOLVADEX) 20 MG tablet Take 1 tablet (20 mg) by mouth daily (Patient not taking: Reported on 10/16/2017) 90 tablet 3     Lidocaine HCl Monohydrate POWD        aluminum chloride (DRYSOL) 20 % external solution Apply topically At Bedtime (Patient not taking: Reported on 10/23/2017) 60 mL 3     tacrolimus (PROTOPIC) 0.1 % ointment Apply topically 2 times daily (Patient not taking: Reported on 10/23/2017) 60 g 3     triamcinolone (KENALOG) 0.025 % ointment Apply topically 2 times daily Mix with 1 lb jar of vaseline or aquaphor (Patient not taking: Reported on 10/23/2017) 80 g 3     UNABLE TO FIND MEDICATION NAME: Tumeric 3 capsules daily (on hold during chemo)       diclofenac (VOLTAREN) 1 % GEL Apply affected area two times daily PRN using enclosed dosing card. 100 g 1     mometasone (ASMANEX 30 METERED DOSES) 110 MCG/INH inhaler Inhale 1 puff into the lungs daily (Patient not taking: Reported on 10/23/2017) 3 Inhaler 3     UNABLE TO FIND 2 tablets 3 times daily MEDICATION NAME: Natural D-Hist (on hold during chemo)       MAGNESIUM CITRATE PO Take 400 mg by mouth daily       calcium citrate-vitamin D (CALCIUM CITRATE +D) 315-250 MG-UNIT TABS Take 2 tablets by mouth 3 times daily 120 tablet      calcium carbonate (TUMS) 500 MG chewable tablet Take 2 tablets (1,000 mg) by mouth nightly as needed for other (cramps) 180 chew tab 3     UNABLE TO FIND 1 tablet 3 times  "daily MEDICATION NAME: calcium D-Glucarate       Triamcinolone Acetonide (AZMACORT IN) Inhale 2 puffs into the lungs as needed       UNABLE TO FIND 1 tablet 3 times daily MEDICATION NAME: Digestzymes       UNABLE TO FIND 1 tablet daily MEDICATION NAME: Pure Encapsulations       Omega-3 Fatty Acids (OMEGA 3 PO) Take 5 mLs by mouth       Probiotic Product (PROBIOTIC DAILY PO) Take 1 capsule by mouth daily Lacto acid bifidobacterium       polyethylene glycol (MIRALAX) powder Take 17 g by mouth daily (Patient not taking: Reported on 10/23/2017) 510 g 1     docusate sodium 100 MG tablet Take 100 mg by mouth daily 60 tablet 1       Physical Examination:  /81  Pulse 131  Temp 98.5  F (36.9  C) (Oral)  Resp 16  Ht 1.651 m (5' 5\")  Wt 85.1 kg (187 lb 9.8 oz)  SpO2 94%  BMI 31.22 kg/m2  Wt Readings from Last 10 Encounters:   10/23/17 85.9 kg (189 lb 4.8 oz)   10/23/17 85.9 kg (189 lb 4.8 oz)   10/21/17 87.1 kg (192 lb)   10/19/17 87.1 kg (192 lb)   10/16/17 88.2 kg (194 lb 8 oz)   10/11/17 87.9 kg (193 lb 12.6 oz)   10/04/17 87.7 kg (193 lb 6.4 oz)   10/03/17 87.7 kg (193 lb 4.8 oz)   09/25/17 87.6 kg (193 lb 3.2 oz)   09/15/17 87.9 kg (193 lb 12.6 oz)     Constitutional: Well-appearing female in no acute distress.  Eyes: EOMI, PERRL. No scleral icterus.  ENT: Oral mucosa is moist without lesions or thrush.   Lymphatic: Neck is supple without cervical or supraclavicular lymphadenopathy.   Cardiovascular: Regular rate and rhythm. Recheck pulse 104. No murmurs, gallops, or rubs. +1 bilateral pitting peripheral edema.  Respiratory: Clear to auscultation bilaterally. No wheezes or crackles.  Gastrointestinal: Bowel sounds present. Abdomen soft,mildly tender in the RUQ. No palpable hepatosplenomegaly or masses.   Neurologic: Cranial nerves II through XII are intact. Full 5/5 strength throughout upper and lower extremities. Patellar reflexes diminished bilaterally. Able to do finger to nose without difficulty or " "tremor. Able to do alternating hand movement with clear instructions. Normal gait. Normal speech. Mild intention tremor noted when patient extends arms.  Skin: No rashes, petechiae, or bruising noted on exposed skin.    Laboratory Data:  Results for IMMANUEL LOVETT (MRN [id number]) as of 10/27/2017 13:53   10/27/2017 06:59   Sodium 140   Potassium 3.8   Chloride 102   Carbon Dioxide 32   Urea Nitrogen 20   Creatinine 0.93   GFR Estimate 62   GFR Estimate If Black 75   Calcium 8.0 (L)   Anion Gap 6   Albumin 3.0 (L)   Protein Total 5.7 (L)   Bilirubin Total 0.3   Alkaline Phosphatase 77   ALT 53 (H)   AST 29   Lactate Dehydrogenase 427 (H)   Uric Acid 6.3 (H)   Glucose 112 (H)   WBC 10.4   Hemoglobin 10.0 (L)   Hematocrit 33.6 (L)   Platelet Count 229   RBC Count 3.90   MCV 86   MCH 25.6 (L)   MCHC 29.8 (L)   RDW 19.3 (H)   Diff Method Manual Differential   % Neutrophils 89.5   % Lymphocytes 6.1   % Monocytes 2.6   % Eosinophils 0.0   % Basophils 0.9   % Myelocytes 0.9   Nucleated RBCs 1 (H)   Absolute Neutrophil 9.3 (H)   Absolute Lymphocytes 0.6 (L)   Absolute Monocytes 0.3   Absolute Eosinophils 0.0   Absolute Basophils 0.1   Absolute Myelocytes 0.1 (H)   Absolute Nucleated RBC 0.1   Anisocytosis Slight   Poikilocytosis Moderate   Polychromasia Slight   Teardrop Cells Slight   Ovalocytes Slight   Microcytes Present   Basophilic Stipling Present       Assessment and Plan:    1. DLBCL, \"double-hit\"-like, currently on treatment with DA-R-EPOCH  Cycle 1 overall tolerated well other than mucositis and mild infusion reaction (rigors) to continuous infusion of chemotherapy. This responded well to e-meds and was able to be resumed without difficulty. Due for cycle 2 DA-R-EPOCH today. ANC was <0.5 on one occasion (0.1 on 10/16, day 10)  and platelets >25k throughout. Per treatment guidelines, will do same dose with cycle 2. Plan for restaging following cycle 3 per Dr. Sauceda.   Patient also has multiple questions " regarding her prognosis and treatment. Did print her treatment plan for her and will talk to Sohail about her concerns and clarification. Patient had significant pain last LP. Consider pretreating with her ativan or oral pain meds prior to her next LP.     Patient's TLS labs were noted to be elevated after patient was admitted to the hospital (, Uric Acid 6.3) Calcium and potassium are normal. Called Dr. Sauceda to discuss. She is going to call the inpatient team. Likely need a CT scan to assess for progression.     2. Sensation of body shaking, new  Patient admits to felling as if her whole body is constantly shaking internally and did have a mild intention tremor, though this was not always present throughout the exam. Her neuro exam was normal other than initial difficulty with alternating hand movements that resolved with further instructions. Will discuss with Dr. Sauceda, though it is possible this is anxiety driven given no clear rigors on exam and no other neurologic findings.     3. Heme  Did not have transfusion requirements this cycle. Today, Hgb 10.0, Plt 229, WBC 10.4, ANC 9.3. Continue to monitor and transfuse if Hgb<8 and Plt<10k.    4. ID  ANC today is 9.3. Continue ppx ACV 400mg BID, levaquin 250mg daily, and fluconazole 200mg daily.    5. Chest tightness and sensation of SOB  Thought to be secondary to lymphoma. Dex had improved these symptoms. Per Rashida last note, she was started on a dex taper on 10/23, done now. Continue to monitor for worsening symptoms.    6. Mucositis, resolving  Continue salt/soda and MMW.     7. Lower extremity edema with pain  Chronic condition likely exacerbated by steroids and excessive fluids. US on 10/21 negative for DVT bilaterally. Continue Lasix. Consider increasing after this cycle of chemo given significant swelling last time. Continue pain medications as prescribed by palliative care.    8. History of stage 1, ER/OK+, HER2- breast cancer s/p  bilateral mastectomies and BENJIE/BSO  Follows with Dr. Crawley. Oncotype recurrence score 11%. Was initially on Arimidex ([id number]) but changed to Tamoxifen due to arthralgias. Tamoxifen held since 10/5 and continue to hold with chemotherapy. F/u Dr. Crawley 11/27/17    9. History of papillary thyroid cancer, s/p total thyroidectomy  Follows with Dr. Csatro. Has post surgical hypothyroidism. Continue levothyroxine and calcitriol as prescribed.    10. History of Rachael en Y gastric bypass  Continue supplements (calcium citrate-vitamin D, vitamin D3, magnesium citrate). Also has iron deficiency anemia likely from poor absorption and per Dr. Sauceda, she should continue 325mg PO ferrous sulfate TID. Updated her med list to reflect this. She should take stool softeners if issues with iron induced constipation.    11. Chronic chest wall pain s/p mastectomies  Follows with Dr. Benitez. Palliative care to continue to prescribe pain medications. Taking MS Contin, Oxycodone for breakthrough pain, and celebrex.     12. Follow-Up  -11/1: Neulasta and Labs  -11/3: Labs and Jessica  -11/6: Labs  -11/10: Labs and Jessica  -11/13: Labs  -11/17: Labs, Jessica, and Hospital Admission for Cycle 3      Roly Desouza PA-C  [facility]  [address] [place] [id number]  [phone]      Again, thank you for allowing me to participate in the care of your patient.      Sincerely,    Jessica Cox PA-C

## 2017-10-27 NOTE — LETTER
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 She is currently undergoing treatment with DA-R-EPOCH. Her past medical history is significant for breast cancer in 2011 s/p bilateral mastectomies and BENJIE/BSO up until recently on Tamoxifen, papillary thyroid cancer s/p total thyroidectomy, chronic chest wall pain, and asthma. Briefly, her oncologic history is as follows:    She presented in 8/2017 with dramatically worse chest and back pain. CT 9/1/17 showed lytic lesion right 10th rib extending to right T9-10 neural foramen with vertebral body invasion. There was associated conglomerate of lymphadenopathy around the mid T-spine. She had a CT guided biopsy showing B-cell high grade lymphoma. Pathology showed \"The morphology shows a population of large cells, diffuse lymphoid infiltrate. The cells are atypical with abundant mitotic and apoptotic activity and single cell necrosis. Tumor is CD45 and CD79a positive and focally weakly CD30 positive. The other stains revealed positivity for CD20, BCL6, BCL2 and MUM1, and CD10 and CD21 negative. The CD5 and CD3 highlighted background T-cells.  MYC expression was equivocal with approximately 40% nuclei staining.  Ki-67 proliferative index is greater than 90-95%. The immunohistochemistry is suggestive of non-GCB immunophenotype of diffuse large B-cell lymphoma. The cytogenetics did not show rearrangement of the BCL2 or c-MYC. There is the presence of BCL6 rearrangement in 78% of cells and gains of MYC and BCL2, but no amplifications.\"     Staging LP and BMBx were negative for lymphoma. She started DA-REPOCH 10/6/17. She did well with chemo other than rigors during CIVI. D/c 10/11/17. Given Neulasta 10/12/17. Post cycle 1 " "complicated by mucositis and worsening of chronic LE peripheral edema. Presents today in routine follow-up prior to cycle 2.    Interval History:  Mrs. Arias returns today with her dad. She states that overall things have gone OK since last week. She states the leg swelling has improved with the 20mg daily of Lasix, though it is still an issue. The pain has been more manageable with the decreased swelling. She is wondering if we could increase her Lasix after this hospital stay.    She also has a new complaint of full body tremor/shaking that she decribes as an internal \"spinning top.\" She states she feels like her body is constantly shaking and she notes a tremor when she extends her arms. She states this started during the last chemo infusions and has persisted. She denies any headaches, though does feel her vision is more blurry than before. No issues with speech, walking, or balance. She denies peripheral neuropathy.     She continues to have her chronic chest wall and back pain, for which she follows with Dr. Benitez. She states her chest tightness and SOB sensation improved with the dexamethasone and she is off of the steroids now. Her mucositis has greatly improved and she continues to use salt and soda swishes.     During our visit patient had questions regarding her prognosis, specifically whether or not her lymphoma was curable. She also notes significant pain with her last LP, even thought the first was tolerated well. She continues to take all of her ppx medications.       Review of Systems:  Patient denies fevers, chills, night sweats, unexplained weight changes, headaches, new lumps or bumps, shortness of breath, cough, abdominal pain, nausea, vomiting, changes to bowel or bladder, bleeding issues, or rash.    Current Outpatient Prescriptions   Medication Sig Dispense Refill     SUMAtriptan (IMITREX) 50 MG tablet Take 1 tablet (50 mg) by mouth at onset of headache for migraine (may repeat in 2 hours " as needed, max 2 tablets daily) 9 tablet 3     calcitRIOL (ROCALTROL) 0.25 MCG capsule TAKE 2 CAPSULES BY MOUTH EVERY MORNING AND TAKE 1 CAPSULE BY MOUTH EVERY NIGHT AT BEDTIME 270 capsule 3     furosemide (LASIX) 20 MG tablet Take 1 tablet (20 mg) by mouth daily 14 tablet 3     celecoxib (CELEBREX) 200 MG capsule TAKE ONE CAPSULE BY MOUTH TWICE DAILY  56 capsule 0     dexamethasone (DECADRON) 4 MG tablet Take 1 tablet (4 mg) by mouth 2 times daily (with meals) Or as directed by MD 60 tablet 0     allopurinol (ZYLOPRIM) 300 MG tablet Take 1 tablet (300 mg) by mouth daily 30 tablet 0     fluconazole (DIFLUCAN) 200 MG tablet Take 1 tablet (200 mg) by mouth daily 30 tablet 0     acyclovir (ZOVIRAX) 400 MG tablet Take 1 tablet (400 mg) by mouth 2 times daily 30 tablet 0     levofloxacin (LEVAQUIN) 250 MG tablet Take 1 tablet (250 mg) by mouth daily 30 tablet 0     order for DME Compression stockings, medium grade, please measure and fit. Please dispense a pair. 1 Units 0     magic mouthwash suspension (diphenhydrAMINE, lidocaine, aluminum-magnesium & simethicone) Swish and spit 10 mLs in mouth every 6 hours as needed for mouth sores 360 mL 1     oxyCODONE (ROXICODONE) 30 MG IR tablet Take 1 tablet (30 mg) by mouth every 4 hours as needed for moderate to severe pain 60 tablet 0     cetirizine (ZYRTEC ALLERGY) 10 MG tablet Take 1 tablet (10 mg) by mouth 3 times daily On hold for lab test. 30 tablet 0     diphenhydrAMINE (BENADRYL) 50 MG capsule Take 1 capsule (50 mg) by mouth nightly as needed for itching or sleep 56 capsule      cyclobenzaprine (FLEXERIL) 10 MG tablet Take 1 tablet (10 mg) by mouth 3 times daily as needed On hold Dr Monsalve 42 tablet 5     methocarbamol (ROBAXIN) 750 MG tablet Take 1 tablet (750 mg) by mouth 4 times daily as needed . Ok to take a 5th Robaxin on very severe days. 360 tablet 1     ondansetron (ZOFRAN-ODT) 8 MG ODT tab Take 1 tablet (8 mg) by mouth every 8 hours as needed for nausea or  vomiting 90 tablet 3     COMPOUND CONTAINING CONTROLLED SUBSTANCE (CMPD RX) - PHARMACY TO MIX COMPOUNDED MEDICATION Apply small amount to affected area two times daily. (Patient not taking: Reported on 10/23/2017) 120 g 6     morphine (MS CONTIN) 30 MG 12 hr tablet Take 1 tablet (30 mg) by mouth every 8 hours . Take an addition pill at night such that your evening dose is 60mg 120 tablet 0     lidocaine-prilocaine (EMLA) cream Apply topically as needed (port access pain.) 30 g 1     diazepam (VALIUM) 5 MG tablet Take 1 tablet (5 mg) by mouth 3 times daily as needed For muscle spasms (Patient taking differently: Take 5 mg by mouth 3 times daily For muscle spasms) 90 tablet 2     levothyroxine (SYNTHROID/LEVOTHROID) 112 MCG tablet Mon-Sat: (2 tabs daily) Sunday: 3 tabs 189 tablet 3     hydrochlorothiazide (MICROZIDE) 12.5 MG capsule Take 1 capsule (12.5 mg) by mouth daily 90 capsule 2     montelukast (SINGULAIR) 10 MG tablet TAKE TWO TABLETS BY MOUTH TWICE DAILY 120 tablet 11     chlorhexidine (PERIDEX) 0.12 % solution   0     EPINEPHrine (EPIPEN 2-CARIDAD) 0.3 MG/0.3ML injection Inject 0.3 mLs (0.3 mg) into the muscle once as needed for anaphylaxis (Patient not taking: Reported on 10/23/2017) 0.6 mL 3     potassium chloride SA (POTASSIUM CHLORIDE) 20 MEQ CR tablet Take 1 tablet (20 mEq) by mouth daily 90 tablet 3     VENTOLIN  (90 BASE) MCG/ACT Inhaler INHALE 2 PUFFS INTO THE LUNGS EVERY 4 HOURS AS NEEDED FOR SHORTNESS OF BREATH OR DIFFICULT BREATHING OR WHEEZING (Patient not taking: Reported on 10/23/2017) 18 g 3     cromolyn (OPTICROM) 4 % ophthalmic solution Place 1 drop into both eyes 4 times daily 10 mL 5     NASALCROM 5.2 MG/ACT AERS Inhaler SPRAY ONE SPRAY( 1 ML) IN NOSTRIL DAILY (Patient not taking: Reported on 10/23/2017) 1 Bottle 6     Cholecalciferol (VITAMIN D3) 2000 UNITS CAPS Take 10,000 Units by mouth daily 60 capsule 4     lidocaine (XYLOCAINE) 5 % ointment SANJANA TOPICALLY QID PRN 35.44 g 3     order  for DME Equipment being ordered: compression stockings. 20-30 mm or 30 - 40 mm as patient can tolerate. Physical therapy to determine if they should be above or below the knee. 2 Units 4     order for DME Equipment being ordered: compression bra (Patient not taking: Reported on 10/23/2017) 2 Device 1     ranitidine (ZANTAC) 75 MG tablet Take 1 tablet (75 mg) by mouth 3 times daily 270 tablet 3     tamoxifen (NOLVADEX) 20 MG tablet Take 1 tablet (20 mg) by mouth daily (Patient not taking: Reported on 10/16/2017) 90 tablet 3     Lidocaine HCl Monohydrate POWD        aluminum chloride (DRYSOL) 20 % external solution Apply topically At Bedtime (Patient not taking: Reported on 10/23/2017) 60 mL 3     tacrolimus (PROTOPIC) 0.1 % ointment Apply topically 2 times daily (Patient not taking: Reported on 10/23/2017) 60 g 3     triamcinolone (KENALOG) 0.025 % ointment Apply topically 2 times daily Mix with 1 lb jar of vaseline or aquaphor (Patient not taking: Reported on 10/23/2017) 80 g 3     UNABLE TO FIND MEDICATION NAME: Tumeric 3 capsules daily (on hold during chemo)       diclofenac (VOLTAREN) 1 % GEL Apply affected area two times daily PRN using enclosed dosing card. 100 g 1     mometasone (ASMANEX 30 METERED DOSES) 110 MCG/INH inhaler Inhale 1 puff into the lungs daily (Patient not taking: Reported on 10/23/2017) 3 Inhaler 3     UNABLE TO FIND 2 tablets 3 times daily MEDICATION NAME: Natural D-Hist (on hold during chemo)       MAGNESIUM CITRATE PO Take 400 mg by mouth daily       calcium citrate-vitamin D (CALCIUM CITRATE +D) 315-250 MG-UNIT TABS Take 2 tablets by mouth 3 times daily 120 tablet      calcium carbonate (TUMS) 500 MG chewable tablet Take 2 tablets (1,000 mg) by mouth nightly as needed for other (cramps) 180 chew tab 3     UNABLE TO FIND 1 tablet 3 times daily MEDICATION NAME: calcium D-Glucarate       Triamcinolone Acetonide (AZMACORT IN) Inhale 2 puffs into the lungs as needed       UNABLE TO FIND 1  "tablet 3 times daily MEDICATION NAME: Digestzymes       UNABLE TO FIND 1 tablet daily MEDICATION NAME: Pure Encapsulations       Omega-3 Fatty Acids (OMEGA 3 PO) Take 5 mLs by mouth       Probiotic Product (PROBIOTIC DAILY PO) Take 1 capsule by mouth daily Lacto acid bifidobacterium       polyethylene glycol (MIRALAX) powder Take 17 g by mouth daily (Patient not taking: Reported on 10/23/2017) 510 g 1     docusate sodium 100 MG tablet Take 100 mg by mouth daily 60 tablet 1       Physical Examination:  /81  Pulse 131  Temp 98.5  F (36.9  C) (Oral)  Resp 16  Ht 1.651 m (5' 5\")  Wt 85.1 kg (187 lb 9.8 oz)  SpO2 94%  BMI 31.22 kg/m2  Wt Readings from Last 10 Encounters:   10/23/17 85.9 kg (189 lb 4.8 oz)   10/23/17 85.9 kg (189 lb 4.8 oz)   10/21/17 87.1 kg (192 lb)   10/19/17 87.1 kg (192 lb)   10/16/17 88.2 kg (194 lb 8 oz)   10/11/17 87.9 kg (193 lb 12.6 oz)   10/04/17 87.7 kg (193 lb 6.4 oz)   10/03/17 87.7 kg (193 lb 4.8 oz)   09/25/17 87.6 kg (193 lb 3.2 oz)   09/15/17 87.9 kg (193 lb 12.6 oz)     Constitutional: Well-appearing female in no acute distress.  Eyes: EOMI, PERRL. No scleral icterus.  ENT: Oral mucosa is moist without lesions or thrush.   Lymphatic: Neck is supple without cervical or supraclavicular lymphadenopathy.   Cardiovascular: Regular rate and rhythm. Recheck pulse 104. No murmurs, gallops, or rubs. +1 bilateral pitting peripheral edema.  Respiratory: Clear to auscultation bilaterally. No wheezes or crackles.  Gastrointestinal: Bowel sounds present. Abdomen soft,mildly tender in the RUQ. No palpable hepatosplenomegaly or masses.   Neurologic: Cranial nerves II through XII are intact. Full 5/5 strength throughout upper and lower extremities. Patellar reflexes diminished bilaterally. Able to do finger to nose without difficulty or tremor. Able to do alternating hand movement with clear instructions. Normal gait. Normal speech. Mild intention tremor noted when patient extends " "arms.  Skin: No rashes, petechiae, or bruising noted on exposed skin.    Laboratory Data:   10/27/2017 06:59   Sodium 140   Potassium 3.8   Chloride 102   Carbon Dioxide 32   Urea Nitrogen 20   Creatinine 0.93   GFR Estimate 62   GFR Estimate If Black 75   Calcium 8.0 (L)   Anion Gap 6   Albumin 3.0 (L)   Protein Total 5.7 (L)   Bilirubin Total 0.3   Alkaline Phosphatase 77   ALT 53 (H)   AST 29   Lactate Dehydrogenase 427 (H)   Uric Acid 6.3 (H)   Glucose 112 (H)   WBC 10.4   Hemoglobin 10.0 (L)   Hematocrit 33.6 (L)   Platelet Count 229   RBC Count 3.90   MCV 86   MCH 25.6 (L)   MCHC 29.8 (L)   RDW 19.3 (H)   Diff Method Manual Differential   % Neutrophils 89.5   % Lymphocytes 6.1   % Monocytes 2.6   % Eosinophils 0.0   % Basophils 0.9   % Myelocytes 0.9   Nucleated RBCs 1 (H)   Absolute Neutrophil 9.3 (H)   Absolute Lymphocytes 0.6 (L)   Absolute Monocytes 0.3   Absolute Eosinophils 0.0   Absolute Basophils 0.1   Absolute Myelocytes 0.1 (H)   Absolute Nucleated RBC 0.1   Anisocytosis Slight   Poikilocytosis Moderate   Polychromasia Slight   Teardrop Cells Slight   Ovalocytes Slight   Microcytes Present   Basophilic Stipling Present       Assessment and Plan:    1. DLBCL, \"double-hit\"-like, currently on treatment with DA-R-EPOCH  Cycle 1 overall tolerated well other than mucositis and mild infusion reaction (rigors) to continuous infusion of chemotherapy. This responded well to e-meds and was able to be resumed without difficulty. Due for cycle 2 DA-R-EPOCH today. ANC was <0.5 on one occasion (0.1 on 10/16, day 10)  and platelets >25k throughout. Per treatment guidelines, will do same dose with cycle 2. Plan for restaging following cycle 3 per Dr. Sauceda.   Patient also has multiple questions regarding her prognosis and treatment. Did print her treatment plan for her and will talk to Sohail about her concerns and clarification. Patient had significant pain last LP. Consider pretreating with her ativan or " oral pain meds prior to her next LP.     Patient's TLS labs were noted to be elevated after patient was admitted to the hospital (, Uric Acid 6.3) Calcium and potassium are normal. Called Dr. Sauceda to discuss. She is going to call the inpatient team. Likely need a CT scan to assess for progression.     2. Sensation of body shaking, new  Patient admits to felling as if her whole body is constantly shaking internally and did have a mild intention tremor, though this was not always present throughout the exam. Her neuro exam was normal other than initial difficulty with alternating hand movements that resolved with further instructions. Will discuss with Dr. Sauceda, though it is possible this is anxiety driven given no clear rigors on exam and no other neurologic findings.     3. Heme  Did not have transfusion requirements this cycle. Today, Hgb 10.0, Plt 229, WBC 10.4, ANC 9.3. Continue to monitor and transfuse if Hgb<8 and Plt<10k.    4. ID  ANC today is 9.3. Continue ppx ACV 400mg BID, levaquin 250mg daily, and fluconazole 200mg daily.    5. Chest tightness and sensation of SOB  Thought to be secondary to lymphoma. Dex had improved these symptoms. Per Rashida last note, she was started on a dex taper on 10/23, done now. Continue to monitor for worsening symptoms.    6. Mucositis, resolving  Continue salt/soda and MMW.     7. Lower extremity edema with pain  Chronic condition likely exacerbated by steroids and excessive fluids. US on 10/21 negative for DVT bilaterally. Continue Lasix. Consider increasing after this cycle of chemo given significant swelling last time. Continue pain medications as prescribed by palliative care.    8. History of stage 1, ER/VT+, HER2- breast cancer s/p bilateral mastectomies and BENJIE/BSO  Follows with Dr. Crawley. Oncotype recurrence score 11%. Was initially on Arimidex ([id number]) but changed to Tamoxifen due to arthralgias. Tamoxifen held since 10/5 and continue to hold  with chemotherapy. F/u Dr. Crawley 11/27/17    9. History of papillary thyroid cancer, s/p total thyroidectomy  Follows with Dr. Castro. Has post surgical hypothyroidism. Continue levothyroxine and calcitriol as prescribed.    10. History of Rachael en Y gastric bypass  Continue supplements (calcium citrate-vitamin D, vitamin D3, magnesium citrate). Also has iron deficiency anemia likely from poor absorption and per Dr. Sauceda, she should continue 325mg PO ferrous sulfate TID. Updated her med list to reflect this. She should take stool softeners if issues with iron induced constipation.    11. Chronic chest wall pain s/p mastectomies  Follows with Dr. Benitez. Palliative care to continue to prescribe pain medications. Taking MS Contin, Oxycodone for breakthrough pain, and celebrex.     12. Follow-Up  -11/1: Neulasta and Labs  -11/3: Labs and Jessica  -11/6: Labs  -11/10: Labs and Jessica  -11/13: Labs  -11/17: Labs, Jessica, and Hospital Admission for Cycle 3      Roly Desouza PA-C  [facility]  [address] [place] [address]  [phone]    The patient was seen in conjunction with Roly Desouza PA-C who served as a scribe for today's visit. I have reviewed and edited the note and agree with the above findings and plan.  Jessica Cox PA-C

## 2017-10-27 NOTE — IP AVS SNAPSHOT
MRN:[id number]                      After Visit Summary   10/27/2017    Tisha Arias    MRN: [id number]           Thank you!     Thank you for choosing [facility] for your care. Our goal is always to provide you with excellent care. Hearing back from our patients is one way we can continue to improve our services. Please take a few minutes to complete the written survey that you may receive in the mail after you visit with us. Thank you!        Patient Information     Date Of Birth          1960        Designated Caregiver       Most Recent Value    Caregiver    Will someone help with your care after discharge? yes    Name of designated caregiver Yomi Arias    Phone number of caregiver [phone] w [phone]     Caregiver address [place]      About your hospital stay     You were admitted on:  October 27, 2017 You last received care in the:  [address] [facility]    You were discharged on:  October 31, 2017        Reason for your hospital stay       Admitted for scheduled chemotherapy.                  Who to Call     For medical emergencies, please call 911.  For non-urgent questions about your medical care, please call your primary care provider or clinic, [phone]          Attending Provider     Provider Specialty    Dom Christianson MD Internal Medicine - Hematology       Primary Care Provider Office Phone # Fax #    Shahla Crawley [phone]       When to contact your care team       [facility] triage line at [phone] for temp >100.4, uncontrolled nausea/vomiting/diarrhea/constipation, unrelieved pain, bleeding not relieved with pressure, dizziness, chest pain, shortness of breath, loss of consciousness, and any new or concerning symptoms.                  After Care Instructions     Activity       Your activity upon discharge: activity as tolerated            Diet       Follow this diet upon discharge: Regular                   Follow-up Appointments     Follow Up and recommended labs and tests       Follow up as recommended below:                  Your next 10 appointments already scheduled     Nov 02, 2017  4:00 PM CDT   [facility] Lab Draw with  [facility] LAB DRAW   [facility] ([facility])    [address] [place] [id number] [phone]            Nov 02, 2017  4:30 PM CDT   (Arrive by 4:15 PM)   INJECTION with  Oncology Injection Nurse   [facility] ([facility])    [address] [place] [id number] [phone]            Nov 02, 2017  5:00 PM CDT   US HEAD NECK SOFT TISSUE with UCUS3   [facility] ([facility])    [address] [place] [id number] [phone]           Please bring a list of your medicines (including vitamins, minerals and over-the-counter drugs). Also, tell your doctor about any allergies you may have. Wear comfortable clothes and leave your valuables at home.  You do not need to do anything special to prepare for your exam.  Please call the Imaging Department at your exam site with any questions.            Nov 02, 2017  6:00 PM CDT   LAB with  LAB   [facility])    [address] [place] [id number] [phone]           Please do not eat 10-12 hours before your appointment if you are coming in fasting for labs on lipids, cholesterol, or glucose (sugar). This does not apply to pregnant women. Water, hot tea and black coffee (with nothing added) are okay. Do not drink other fluids, diet soda or chew gum.            Nov 03, 2017  4:00 PM CDT   [facility] Lab Draw with  MASONIC LAB DRAW   [facility] Draw ([facility])    [address] [place] [id number] [phone]            Nov 03,  2017  4:40 PM CDT   (Arrive by 4:25 PM)   Return Visit with FABIO Levi [facility] ([facility])    [address] [place] [id number]   [phone]            Nov 06, 2017  6:30 AM CST   Masonic Lab Draw with  MASONIC LAB DRAW   [facility] Lab Draw ([facility])    [address] [place] [phone]   [phone]            Nov 10, 2017 12:45 PM CST   Masonic Lab Draw with  MASONIC LAB DRAW   [facility] Lab Draw ([facility])    [address] [place] [address]   [phone]            Nov 10, 2017  1:20 PM CST   (Arrive by 1:05 PM)   Return Visit with Jessica Cox PA-C   [facility] ([facility])    [address] [place] [address]   [phone]            Nov 13, 2017  4:30 PM CST   (Arrive by 4:15 PM)   RETURN ENDOCRINE with Avelina Castro MD   [facility] ([facility])    [address] [place] [id number] [phone]              Future tests that were ordered for you     Basic metabolic panel           Comprehensive metabolic panel           CBC with platelets differential       Last Lab Result: Hemoglobin (g/dL)       Date                     Value                 10/30/2017               8.1 (L)          ----------                  Pending Results     Date and Time Order Name Status Description    10/29/2017 1800 CSF Culture Aerobic Bacterial Preliminary             Statement of Approval     Ordered          10/31/17 1332  I have reviewed and agree with all the recommendations and orders detailed in this document.  EFFECTIVE NOW     Approved and electronically signed by:  Tiesha Bernard PA-C             Admission Information     Date & Time Provider Department Dept.  "Phone    10/27/2017 Dom Christianson MD [address] [facility] [phone]      Your Vitals Were     Blood Pressure Pulse Temperature Respirations Height Weight    129/74 (BP Location: Left arm) 81 96.9  F (36.1  C) (Oral) 18 1.651 m (5' 5\") 85 kg (187 lb 8 oz)    Pulse Oximetry BMI (Body Mass Index)                99% 31.2 kg/m2          ZA[employer] Information     [employer] gives you secure access to your electronic health record. If you see a primary care provider, you can also send messages to your care team and make appointments. If you have questions, please call your primary care clinic.  If you do not have a primary care provider, please call [phone] and they will assist you.        Care EveryWhere ID     This is your Care EveryWhere ID. This could be used by other organizations to access your [facility] medical records  [id number]        Equal Access to Services     RODRIGO ZAMORA AH: Kevin Venegas, wajony ge, qarehana kaalmajosseline lama, ranjan martin. So [facility] [phone].    ATENCIÓN: Si habla español, tiene a stiles disposición servicios gratuitos de asistencia lingüística. Llame al [phone].    We comply with applicable federal civil rights laws and [place] laws. We do not discriminate on the basis of race, color, national origin, age, disability, sex, sexual orientation, or gender identity.               Review of your medicines      START taking        Dose / Directions    bisacodyl 5 MG EC tablet   Used for:  Constipation, unspecified constipation type        Dose:  15 mg   Take 3 tablets (15 mg) by mouth daily as needed for constipation   Quantity:  30 tablet   Refills:  0       senna-docusate 8.6-50 MG per tablet   Commonly known as:  SENOKOT-S;PERICOLACE   Used for:  Acute constipation        Dose:  1-2 tablet   Take 1-2 tablets by mouth 2 times daily as needed for constipation   Quantity:  60 tablet   Refills:  0         CONTINUE these medicines " which may have CHANGED, or have new prescriptions. If we are uncertain of the size of tablets/capsules you have at home, strength may be listed as something that might have changed.        Dose / Directions    calcitRIOL 0.25 MCG capsule   Commonly known as:  ROCALTROL   This may have changed:  additional instructions   Used for:  Postsurgical hypothyroidism, Papillary carcinoma, follicular variant (H), Metastasis to cervical lymph node (H)        TAKE 2 CAPSULES BY MOUTH EVERY MORNING AND TAKE 1 CAPSULE BY MOUTH EVERY NIGHT AT BEDTIME   Quantity:  270 capsule   Refills:  3       cyclobenzaprine 10 MG tablet   Commonly known as:  FLEXERIL   This may have changed:  additional instructions   Used for:  High grade B-cell lymphoma (H)        Dose:  10 mg   Take 1 tablet (10 mg) by mouth 3 times daily as needed   Quantity:  90 tablet   Refills:  0       dexamethasone 4 MG tablet   Commonly known as:  DECADRON   This may have changed:    - how much to take  - how to take this  - when to take this  - additional instructions   Used for:  High grade B-cell lymphoma (H)        Take 8 mg (2 tab) by mouth 11/1 and 11/2, then 4 mg (1 tab) by mouth 11/3.   Quantity:  5 tablet   Refills:  0       diazepam 5 MG tablet   Commonly known as:  VALIUM   This may have changed:    - when to take this  - additional instructions   Used for:  Chest wall pain        Dose:  5 mg   Take 1 tablet (5 mg) by mouth 3 times daily as needed For muscle spasms   Quantity:  90 tablet   Refills:  2       ferrous sulfate 325 (65 FE) MG tablet   Commonly known as:  IRON   Indication:  Anemia From Inadequate Iron in the Body   This may have changed:    - how much to take  - Another medication with the same name was removed. Continue taking this medication, and follow the directions you see here.   Used for:  Status post bariatric surgery        Dose:  325 mg   Take 1 tablet (325 mg) by mouth 3 times daily (with meals)   Quantity:  90 tablet   Refills:  0        furosemide 20 MG tablet   Commonly known as:  LASIX   This may have changed:  how much to take   Used for:  High grade B-cell lymphoma (H)        Dose:  40 mg   Start taking on:  11/1/2017   Take 2 tablets (40 mg) by mouth daily for 2 days   Quantity:  4 tablet   Refills:  0       levothyroxine 112 MCG tablet   Commonly known as:  SYNTHROID/LEVOTHROID   This may have changed:    - how much to take  - additional instructions   Used for:  Postsurgical hypothyroidism, Papillary carcinoma, follicular variant (H), Postsurgical hypoparathyroidism (H), Thyroid cancer (H)        Mon-Sat: (2 tabs daily) Sunday: 3 tabs   Quantity:  189 tablet   Refills:  3       methocarbamol 750 MG tablet   Commonly known as:  ROBAXIN   This may have changed:    - when to take this  - additional instructions   Used for:  Myofascial pain        Dose:  750 mg   Take 1 tablet (750 mg) by mouth 4 times daily as needed . Ok to take a 5th Robaxin on very severe days.   Quantity:  360 tablet   Refills:  1       triamcinolone 0.025 % ointment   Commonly known as:  KENALOG   This may have changed:  additional instructions   Used for:  Intertrigo, Rash        Apply topically 2 times daily Mix with 1 lb jar of vaseline or aquaphor   Quantity:  80 g   Refills:  3         CONTINUE these medicines which have NOT CHANGED        Dose / Directions    acyclovir 400 MG tablet   Commonly known as:  ZOVIRAX   Indication:  Prophylaxis of Herpes Simplex   Used for:  High grade B-cell lymphoma (H)        Dose:  400 mg   Take 1 tablet (400 mg) by mouth 2 times daily   Quantity:  60 tablet   Refills:  0       allopurinol 300 MG tablet   Commonly known as:  ZYLOPRIM   Used for:  High grade B-cell lymphoma (H)        Dose:  300 mg   Take 1 tablet (300 mg) by mouth daily   Quantity:  30 tablet   Refills:  0       aluminum chloride 20 % external solution   Commonly known as:  DRYSOL   Used for:  Rash, Intertrigo        Apply topically At Bedtime   Quantity:  60 mL    Refills:  3       AZMACORT IN        Dose:  2 puff   Inhale 2 puffs into the lungs as needed   Refills:  0       CALCIUM CITRATE +D 315-250 MG-UNIT Tabs per tablet   Generic drug:  calcium citrate-vitamin D        Dose:  2 tablet   Take 2 tablets by mouth 3 times daily   Quantity:  120 tablet   Refills:  0       celecoxib 200 MG capsule   Commonly known as:  celeBREX   Used for:  Chest wall pain        TAKE ONE CAPSULE BY MOUTH TWICE DAILY   Quantity:  56 capsule   Refills:  0       cetirizine 10 MG tablet   Commonly known as:  ZYRTEC ALLERGY   Used for:  High grade B-cell lymphoma (H)        Dose:  10 mg   Take 1 tablet (10 mg) by mouth 3 times daily On hold for lab test.   Quantity:  30 tablet   Refills:  0       COMPOUND CONTAINING CONTROLLED SUBSTANCE - PHARMACY TO MIX COMPOUNDED MEDICATION   Commonly known as:  CMPD RX   Used for:  Neoplasm related pain        Apply small amount to affected area two times daily.   Quantity:  120 g   Refills:  6       cromolyn 4 % ophthalmic solution   Commonly known as:  OPTICROM   Used for:  Idiopathic mast cell activation syndrome (H)        Dose:  1 drop   Place 1 drop into both eyes 4 times daily   Quantity:  10 mL   Refills:  5       diclofenac 1 % Gel topical gel   Commonly known as:  VOLTAREN   Used for:  Myofascial pain        Apply affected area two times daily PRN using enclosed dosing card.   Quantity:  100 g   Refills:  0       diphenhydrAMINE 50 MG capsule   Commonly known as:  BENADRYL   Used for:  High grade B-cell lymphoma (H)        Dose:  50 mg   Take 1 capsule (50 mg) by mouth nightly as needed for itching or sleep   Quantity:  56 capsule   Refills:  0       docusate sodium 100 MG tablet   Commonly known as:  COLACE   Used for:  Acute constipation        Dose:  100 mg   Take 100 mg by mouth daily   Quantity:  30 tablet   Refills:  0       EPINEPHrine 0.3 MG/0.3ML injection 2-pack   Commonly known as:  EPIPEN 2-CARIDAD        Dose:  0.3 mg   Inject 0.3 mLs (0.3  mg) into the muscle once as needed for anaphylaxis   Quantity:  0.6 mL   Refills:  3       fluconazole 200 MG tablet   Commonly known as:  DIFLUCAN   Indication:  Fungal Infection Prophylaxis   Used for:  High grade B-cell lymphoma (H)        Dose:  200 mg   Take 1 tablet (200 mg) by mouth daily   Quantity:  30 tablet   Refills:  0       hydrochlorothiazide 12.5 MG capsule   Commonly known as:  MICROZIDE   Used for:  Hypocalcemia        Dose:  12.5 mg   Take 1 capsule (12.5 mg) by mouth daily   Quantity:  90 capsule   Refills:  2       levofloxacin 250 MG tablet   Commonly known as:  LEVAQUIN   Indication:  bacterial ppx   Used for:  High grade B-cell lymphoma (H)        Dose:  250 mg   Take 1 tablet (250 mg) by mouth daily   Quantity:  30 tablet   Refills:  0       lidocaine 5 % ointment   Commonly known as:  XYLOCAINE   Used for:  Chest wall pain        SANJANA TOPICALLY QID PRN   Quantity:  35.44 g   Refills:  3       lidocaine visc 2% 2.5mL/5mL & maalox/mylanta w/ simeth 2.5mL/5mL & diphenhydrAMINE 5mg/5mL Susp suspension   Commonly known as:  [facility]IC Mouthwash [facility]   Used for:  Stomatitis and mucositis, High grade B-cell lymphoma (H)        Dose:  10 mL   Swish and spit 10 mLs in mouth every 6 hours as needed for mouth sores   Quantity:  360 mL   Refills:  1       lidocaine-prilocaine cream   Commonly known as:  EMLA   Used for:  Neoplasm related pain, Chest wall pain        Apply topically as needed (port access pain.)   Quantity:  30 g   Refills:  1       mometasone 110 MCG/INH inhaler   Commonly known as:  ASMANEX 30 METERED DOSES   Used for:  Intermittent asthma, uncomplicated        Dose:  1 puff   Inhale 1 puff into the lungs daily   Quantity:  3 Inhaler   Refills:  3       montelukast 10 MG tablet   Commonly known as:  SINGULAIR   Used for:  Idiopathic mast cell activation syndrome (H)        TAKE TWO TABLETS BY MOUTH TWICE DAILY   Quantity:  120 tablet   Refills:  11       * MS CONTIN PO        Dose:  60  mg   Take 60 mg by mouth daily Takes at 8pm   Refills:  0       * morphine 30 MG 12 hr tablet   Commonly known as:  MS CONTIN   Used for:  Neoplasm related pain        Dose:  30 mg   Start taking on:  11/2/2017   Take 1 tablet (30 mg) by mouth every 8 hours . Take an addition pill at night such that your evening dose is 60mg   Quantity:  120 tablet   Refills:  0       NASALCROM 5.2 MG/ACT Aers Inhaler   Used for:  Mast cell disease, systemic   Generic drug:  cromolyn sodium        SPRAY ONE SPRAY( 1 ML) IN NOSTRIL DAILY   Quantity:  1 Bottle   Refills:  6       OMEGA 3 PO        Dose:  5 mL   Take 5 mLs by mouth   Refills:  0       ondansetron 8 MG ODT tab   Commonly known as:  ZOFRAN ODT   Used for:  Nausea with vomiting        Dose:  8 mg   Take 1 tablet (8 mg) by mouth every 8 hours as needed for nausea or vomiting   Quantity:  90 tablet   Refills:  3       * order for DME   Used for:  Venous stasis        Equipment being ordered: compression stockings. 20-30 mm or 30 - 40 mm as patient can tolerate. Physical therapy to determine if they should be above or below the knee.   Quantity:  2 Units   Refills:  4       * order for DME   Used for:  Malignant neoplasm of right female breast, unspecified site of breast        Equipment being ordered: compression bra   Quantity:  2 Device   Refills:  1       * order for DME   Used for:  Peripheral edema        Compression stockings, medium grade, please measure and fit. Please dispense a pair.   Quantity:  1 Units   Refills:  0       oxyCODONE 30 MG IR tablet   Commonly known as:  ROXICODONE   Used for:  High grade B-cell lymphoma (H)        Dose:  30 mg   Take 1 tablet (30 mg) by mouth every 4 hours as needed for moderate to severe pain   Quantity:  180 tablet   Refills:  0       polyethylene glycol powder   Commonly known as:  MIRALAX   Used for:  Acute constipation        Dose:  1 capful   Take 17 g (1 capful) by mouth daily   Quantity:  510 g   Refills:  0        potassium chloride SA 20 MEQ CR tablet   Commonly known as:  KLOR-CON   Used for:  Hypokalemia        Dose:  20 mEq   Take 1 tablet (20 mEq) by mouth daily   Quantity:  90 tablet   Refills:  3       PROBIOTIC DAILY PO   Used for:  Breast cancer, unspecified laterality, Thyroid cancer (H), Chronic arthralgias of knees and hips, unspecified laterality        Dose:  1 capsule   Take 1 capsule by mouth daily Lacto acid bifidobacterium   Refills:  0       ranitidine 75 MG tablet   Commonly known as:  ZANTAC   Used for:  Mast cell disease, systemic        Dose:  75 mg   Take 1 tablet (75 mg) by mouth 3 times daily   Quantity:  270 tablet   Refills:  3       SUMAtriptan 50 MG tablet   Commonly known as:  IMITREX   Used for:  Migraine without status migrainosus, not intractable, unspecified migraine type        Dose:  50 mg   Take 1 tablet (50 mg) by mouth at onset of headache for migraine (may repeat in 2 hours as needed, max 2 tablets daily)   Quantity:  9 tablet   Refills:  3       tacrolimus 0.1 % ointment   Commonly known as:  PROTOPIC   Used for:  Rash, Intertrigo        Apply topically 2 times daily   Quantity:  60 g   Refills:  3       TUMS 500 MG chewable tablet   Generic drug:  calcium carbonate        Dose:  1.5 chew tab   Take 2 tablets (1,000 mg) by mouth nightly as needed for other (cramps)   Quantity:  180 chew tab   Refills:  3       * UNABLE TO FIND        Dose:  3 tablet   3 tablets 3 times daily MEDICATION NAME: calcium D-Glucarate  3 caps contain 180mg of elemental calcium.   Refills:  0       * UNABLE TO FIND   Indication:  On hold for drug testing   Used for:  Breast cancer, unspecified laterality, Papillary carcinoma, follicular variant (H), Chronic musculoskeletal pain        Dose:  2 tablet   2 tablets 3 times daily MEDICATION NAME: Natural D-Hist (on hold during chemo)   Refills:  0       * UNABLE TO FIND        MEDICATION NAME: Tumeric 3 capsules daily (on hold during chemo)   Refills:  0       *  UNABLE TO FIND        Dose:  2 capsule   Take 2 capsules by mouth 3 times daily Muscle Mag. 2 caps contain B1 20mg, B2 20mg, B6 10mg, magesium 20mg, manganese 2mg.   Refills:  0       * UNABLE TO FIND   Used for:  Thyroid cancer (H), Postsurgical hypothyroidism, Postsurgical hypoparathyroidism (H)        Dose:  2 tablet   2 tablets 3 times daily MEDICATION NAME: Digestzymes   Refills:  0       * UNABLE TO FIND   Indication:  P5P50   Used for:  Thyroid cancer (H), Postsurgical hypothyroidism, Postsurgical hypoparathyroidism (H)        Dose:  1 tablet   1 tablet daily MEDICATION NAME: Pure Encapsulations   Refills:  0       VENTOLIN  (90 BASE) MCG/ACT Inhaler   Used for:  Mild intermittent asthma without complication   Generic drug:  albuterol        INHALE 2 PUFFS INTO THE LUNGS EVERY 4 HOURS AS NEEDED FOR SHORTNESS OF BREATH OR DIFFICULT BREATHING OR WHEEZING   Quantity:  18 g   Refills:  3       vitamin D3 2000 UNITS Caps   Used for:  Thyroid cancer (H), Postsurgical hypothyroidism, Papillary carcinoma, follicular variant (H), Postsurgical hypoparathyroidism (H)        Dose:  5000 Units   Take 5,000 Units by mouth daily Takes 2 tabs daily   Quantity:  60 capsule   Refills:  4       * Notice:  This list has 11 medication(s) that are the same as other medications prescribed for you. Read the directions carefully, and ask your doctor or other care provider to review them with you.      STOP taking     chlorhexidine 0.12 % solution   Commonly known as:  PERIDEX           MAGNESIUM CITRATE PO           tamoxifen 20 MG tablet   Commonly known as:  NOLVADEX                Where to get your medicines      These medications were sent to [id number] - [place], [place] - [address] [address] [place]  [address] [address] [place] [address]     Phone:  [phone]     acyclovir 400 MG tablet    allopurinol 300 MG tablet    bisacodyl 5 MG EC tablet    cetirizine 10 MG tablet    cyclobenzaprine 10 MG tablet     dexamethasone 4 MG tablet    diclofenac 1 % Gel topical gel    docusate sodium 100 MG tablet    ferrous sulfate 325 (65 FE) MG tablet    fluconazole 200 MG tablet    furosemide 20 MG tablet    levofloxacin 250 MG tablet    polyethylene glycol powder    senna-docusate 8.6-50 MG per tablet         These medications were sent to [facility] - [place], [place] - [address]  [address] [address]     Phone:  [phone]     lidocaine-prilocaine cream         Some of these will need a paper prescription and others can be bought over the counter. Ask your nurse if you have questions.     Bring a paper prescription for each of these medications     lidocaine visc 2% 2.5mL/5mL & maalox/mylanta w/ simeth 2.5mL/5mL & diphenhydrAMINE 5mg/5mL Susp suspension    morphine 30 MG 12 hr tablet    oxyCODONE 30 MG IR tablet               ANTIBIOTIC INSTRUCTION     You've Been Prescribed an Antibiotic - Now What?  Your healthcare team thinks that you or your loved one might have an infection. Some infections can be treated with antibiotics, which are powerful, life-saving drugs. Like all medications, antibiotics have side effects and should only be used when necessary. There are some important things you should know about your antibiotic treatment.      Your healthcare team may run tests before you start taking an antibiotic.    Your team may take samples (e.g., from your blood, urine or other areas) to run tests to look for bacteria. These test can be important to determine if you need an antibiotic at all and, if you do, which antibiotic will work best.      Within a few days, your healthcare team might change or even stop your antibiotic.    Your team may start you on an antibiotic while they are working to find out what is making you sick.    Your team might change your antibiotic because test results show that a different antibiotic would be better to treat your infection.    In some  cases, once your team has more information, they learn that you do not need an antibiotic at all. They may find out that you don't have an infection, or that the antibiotic you're taking won't work against your infection. For example, an infection caused by a virus can't be treated with antibiotics. Staying on an antibiotic when you don't need it is more likely to be harmful than helpful.      You may experience side effects from your antibiotic.    Like all medications, antibiotics have side effects. Some of these can be serious.    Let you healthcare team know if you have any known allergies when you are admitted to the hospital.    One significant side effect of nearly all antibiotics is the risk of severe and sometimes deadly diarrhea caused by Clostridium difficile (C. Difficile). This occurs when a person takes antibiotics because some good germs are destroyed. Antibiotic use allows C. diificile to take over, putting patients at high risk for this serious infection.    As a patient or caregiver, it is important to understand your or your loved one's antibiotic treatment. It is especially important for caregivers to speak up when patients can't speak for themselves. Here are some important questions to ask your healthcare team.    What infection is this antibiotic treating and how do you know I have that infection?    What side effects might occur from this antibiotic?    How long will I need to take this antibiotic?    Is it safe to take this antibiotic with other medications or supplements (e.g., vitamins) that I am taking?     Are there any special directions I need to know about taking this antibiotic? For example, should I take it with food?    How will I be monitored to know whether my infection is responding to the antibiotic?    What tests may help to make sure the right antibiotic is prescribed for me?      Information provided by:  www.cdc.gov/getsmart  [place] Department of Health and Human  Services  Centers for disease Control and Prevention  National Center for Emerging and Zoonotic Infectious Diseases  Division of Healthcare Quality Promotion         Protect others around you: Learn how to safely use, store and throw away your medicines at www.disposemymeds.org.             Medication List: This is a list of all your medications and when to take them. Check marks below indicate your daily home schedule. Keep this list as a reference.      Medications           Morning Afternoon Evening Bedtime As Needed    acyclovir 400 MG tablet   Commonly known as:  ZOVIRAX   Take 1 tablet (400 mg) by mouth 2 times daily   Last time this was given:  400 mg on 10/31/2017  5:51 AM                                      allopurinol 300 MG tablet   Commonly known as:  ZYLOPRIM   Take 1 tablet (300 mg) by mouth daily   Last time this was given:  300 mg on 10/31/2017  5:51 AM                                   aluminum chloride 20 % external solution   Commonly known as:  DRYSOL   Apply topically At Bedtime                                   AZMACORT IN   Inhale 2 puffs into the lungs as needed                                   bisacodyl 5 MG EC tablet   Take 3 tablets (15 mg) by mouth daily as needed for constipation   Last time this was given:  15 mg on 10/29/2017 10:42 PM                                   calcitRIOL 0.25 MCG capsule   Commonly known as:  ROCALTROL   TAKE 2 CAPSULES BY MOUTH EVERY MORNING AND TAKE 1 CAPSULE BY MOUTH EVERY NIGHT AT BEDTIME   Last time this was given:  0.5 mcg on 10/31/2017 12:05 PM                                      CALCIUM CITRATE +D 315-250 MG-UNIT Tabs per tablet   Take 2 tablets by mouth 3 times daily   Last time this was given:  2 tablets on 10/27/2017  2:10 PM   Generic drug:  calcium citrate-vitamin D                                         celecoxib 200 MG capsule   Commonly known as:  celeBREX   TAKE ONE CAPSULE BY MOUTH TWICE DAILY   Last time this was given:  200 mg on  10/31/2017  8:10 AM                                      cetirizine 10 MG tablet   Commonly known as:  ZYRTEC ALLERGY   Take 1 tablet (10 mg) by mouth 3 times daily On hold for lab test.   Last time this was given:  10 mg on 10/31/2017 11:57 AM                                         COMPOUND CONTAINING CONTROLLED SUBSTANCE - PHARMACY TO MIX COMPOUNDED MEDICATION   Commonly known as:  CMPD RX   Apply small amount to affected area two times daily.                                cromolyn 4 % ophthalmic solution   Commonly known as:  OPTICROM   Place 1 drop into both eyes 4 times daily   Last time this was given:  1 drop on 10/31/2017 11:56 AM                                            cyclobenzaprine 10 MG tablet   Commonly known as:  FLEXERIL   Take 1 tablet (10 mg) by mouth 3 times daily as needed   Last time this was given:  10 mg on 10/28/2017  6:17 PM                                   dexamethasone 4 MG tablet   Commonly known as:  DECADRON   Take 8 mg (2 tab) by mouth 11/1 and 11/2, then 4 mg (1 tab) by mouth 11/3.                                   diazepam 5 MG tablet   Commonly known as:  VALIUM   Take 1 tablet (5 mg) by mouth 3 times daily as needed For muscle spasms   Last time this was given:  5 mg on 10/31/2017  2:24 PM                                   diclofenac 1 % Gel topical gel   Commonly known as:  VOLTAREN   Apply affected area two times daily PRN using enclosed dosing card.   Last time this was given:  2 g on 10/29/2017  7:44 AM                                   diphenhydrAMINE 50 MG capsule   Commonly known as:  BENADRYL   Take 1 capsule (50 mg) by mouth nightly as needed for itching or sleep   Last time this was given:  50 mg on 10/27/2017 12:14 PM                                   docusate sodium 100 MG tablet   Commonly known as:  COLACE   Take 100 mg by mouth daily                                   EPINEPHrine 0.3 MG/0.3ML injection 2-pack   Commonly known as:  EPIPEN 2-CARIDAD   Inject 0.3 mLs  (0.3 mg) into the muscle once as needed for anaphylaxis                                   ferrous sulfate 325 (65 FE) MG tablet   Commonly known as:  IRON   Take 1 tablet (325 mg) by mouth 3 times daily (with meals)   Last time this was given:  325 mg on 10/31/2017 11:57 AM                                         fluconazole 200 MG tablet   Commonly known as:  DIFLUCAN   Take 1 tablet (200 mg) by mouth daily   Last time this was given:  200 mg on 10/30/2017  7:54 PM                                furosemide 20 MG tablet   Commonly known as:  LASIX   Take 2 tablets (40 mg) by mouth daily for 2 days   Start taking on:  11/1/2017   Last time this was given:  40 mg on 10/31/2017  2:24 PM                                   hydrochlorothiazide 12.5 MG capsule   Commonly known as:  MICROZIDE   Take 1 capsule (12.5 mg) by mouth daily   Last time this was given:  12.5 mg on 10/31/2017 11:58 AM                                   levofloxacin 250 MG tablet   Commonly known as:  LEVAQUIN   Take 1 tablet (250 mg) by mouth daily                                   levothyroxine 112 MCG tablet   Commonly known as:  SYNTHROID/LEVOTHROID   Mon-Sat: (2 tabs daily) Sunday: 3 tabs   Last time this was given:  224 mcg on 10/31/2017  5:50 AM                                   lidocaine 5 % ointment   Commonly known as:  XYLOCAINE   SANJANA TOPICALLY QID PRN   Last time this was given:  10/29/2017  7:44 AM                                   lidocaine visc 2% 2.5mL/5mL & maalox/mylanta w/ simeth 2.5mL/5mL & diphenhydrAMINE 5mg/5mL Susp suspension   Commonly known as:  MAGIC Mouthwash [facility]   Swish and spit 10 mLs in mouth every 6 hours as needed for mouth sores                                   lidocaine-prilocaine cream   Commonly known as:  EMLA   Apply topically as needed (port access pain.)                                methocarbamol 750 MG tablet   Commonly known as:  ROBAXIN   Take 1 tablet (750 mg) by mouth 4 times daily as needed . Ok to  take a 5th Robaxin on very severe days.   Last time this was given:  750 mg on 10/31/2017  3:36 PM                                   mometasone 110 MCG/INH inhaler   Commonly known as:  ASMANEX 30 METERED DOSES   Inhale 1 puff into the lungs daily                                   montelukast 10 MG tablet   Commonly known as:  SINGULAIR   TAKE TWO TABLETS BY MOUTH TWICE DAILY   Last time this was given:  20 mg on 10/31/2017  5:51 AM                                      * MS CONTIN PO   Take 60 mg by mouth daily Takes at 8pm   Last time this was given:  30 mg on 10/31/2017 11:57 AM                                   * morphine 30 MG 12 hr tablet   Commonly known as:  MS CONTIN   Take 1 tablet (30 mg) by mouth every 8 hours . Take an addition pill at night such that your evening dose is 60mg   Start taking on:  11/2/2017   Last time this was given:  30 mg on 10/31/2017 11:57 AM                                NASALCROM 5.2 MG/ACT Aers Inhaler   SPRAY ONE SPRAY( 1 ML) IN NOSTRIL DAILY   Generic drug:  cromolyn sodium                                   OMEGA 3 PO   Take 5 mLs by mouth                                   ondansetron 8 MG ODT tab   Commonly known as:  ZOFRAN ODT   Take 1 tablet (8 mg) by mouth every 8 hours as needed for nausea or vomiting                                   * order for DME   Equipment being ordered: compression stockings. 20-30 mm or 30 - 40 mm as patient can tolerate. Physical therapy to determine if they should be above or below the knee.                                * order for DME   Equipment being ordered: compression bra                                * order for DME   Compression stockings, medium grade, please measure and fit. Please dispense a pair.                                oxyCODONE 30 MG IR tablet   Commonly known as:  ROXICODONE   Take 1 tablet (30 mg) by mouth every 4 hours as needed for moderate to severe pain   Last time this was given:  30 mg on 10/31/2017  4:06 PM                                    polyethylene glycol powder   Commonly known as:  MIRALAX   Take 17 g (1 capful) by mouth daily                                   potassium chloride SA 20 MEQ CR tablet   Commonly known as:  KLOR-CON   Take 1 tablet (20 mEq) by mouth daily   Last time this was given:  20 mEq on 10/31/2017 11:57 AM                                   PROBIOTIC DAILY PO   Take 1 capsule by mouth daily Lacto acid bifidobacterium                                   ranitidine 75 MG tablet   Commonly known as:  ZANTAC   Take 1 tablet (75 mg) by mouth 3 times daily   Last time this was given:  75 mg on 10/31/2017 11:57 AM                                         senna-docusate 8.6-50 MG per tablet   Commonly known as:  SENOKOT-S;PERICOLACE   Take 1-2 tablets by mouth 2 times daily as needed for constipation   Last time this was given:  2 tablets on 10/31/2017  8:10 AM                                   SUMAtriptan 50 MG tablet   Commonly known as:  IMITREX   Take 1 tablet (50 mg) by mouth at onset of headache for migraine (may repeat in 2 hours as needed, max 2 tablets daily)                                   tacrolimus 0.1 % ointment   Commonly known as:  PROTOPIC   Apply topically 2 times daily                                      triamcinolone 0.025 % ointment   Commonly known as:  KENALOG   Apply topically 2 times daily Mix with 1 lb jar of vaseline or aquaphor                                      TUMS 500 MG chewable tablet   Take 2 tablets (1,000 mg) by mouth nightly as needed for other (cramps)   Generic drug:  calcium carbonate                                   * UNABLE TO FIND   3 tablets 3 times daily MEDICATION NAME: calcium D-Glucarate  3 caps contain 180mg of elemental calcium.   Last time this was given:  10/31/2017  8:15 AM                                * UNABLE TO FIND   2 tablets 3 times daily MEDICATION NAME: Natural D-Hist (on hold during chemo)   Last time this was given:  10/31/2017  8:15 AM                                 * UNABLE TO FIND   MEDICATION NAME: Tumeric 3 capsules daily (on hold during chemo)   Last time this was given:  10/31/2017  8:15 AM                                * UNABLE TO FIND   Take 2 capsules by mouth 3 times daily Muscle Mag. 2 caps contain B1 20mg, B2 20mg, B6 10mg, magesium 20mg, manganese 2mg.   Last time this was given:  10/31/2017  8:15 AM                                * UNABLE TO FIND   2 tablets 3 times daily MEDICATION NAME: Digestzymes   Last time this was given:  10/31/2017  8:15 AM                                * UNABLE TO FIND   1 tablet daily MEDICATION NAME: Pure Encapsulations   Last time this was given:  10/31/2017  8:15 AM                                VENTOLIN  (90 BASE) MCG/ACT Inhaler   INHALE 2 PUFFS INTO THE LUNGS EVERY 4 HOURS AS NEEDED FOR SHORTNESS OF BREATH OR DIFFICULT BREATHING OR WHEEZING   Generic drug:  albuterol                                   vitamin D3 2000 UNITS Caps   Take 5,000 Units by mouth daily Takes 2 tabs daily                                   * Notice:  This list has 11 medication(s) that are the same as other medications prescribed for you. Read the directions carefully, and ask your doctor or other care provider to review them with you.

## 2017-10-27 NOTE — MR AVS SNAPSHOT
After Visit Summary   10/27/2017    Tisha Arias    MRN: [id number]           Patient Information     Date Of Birth          1960        Visit Information        Provider Department      10/27/2017 7:00 AM Jessica Cox PA-C [facility]        Today's Diagnoses     High grade B-cell lymphoma (H)    -  1    Neoplasm related pain        Chest wall pain           Follow-ups after your visit        Your next 10 appointments already scheduled     Nov 02, 2017  4:00 PM CDT   [facility]  [facility] LAB DRAW   [facility] Lab Draw ([facility])    [address] [place] [id number]   [phone]            Nov 02, 2017  4:30 PM CDT   (Arrive by 4:15 PM)   INJECTION with  Oncology Injection Nurse   [facility] ([facility])    [address] [place] [id number]   [phone]            Nov 02, 2017  5:00 PM CDT   US HEAD NECK SOFT TISSUE with UCUS3   [facility] US ([facility])    [address] [place] [id number] [phone]           Please bring a list of your medicines (including vitamins, minerals and over-the-counter drugs). Also, tell your doctor about any allergies you may have. Wear comfortable clothes and leave your valuables at home.  You do not need to do anything special to prepare for your exam.  Please call the Imaging Department at your exam site with any questions.            Nov 02, 2017  6:00 PM CDT   LAB with  LAB   [facility])    [address] [place] [id number] [phone]           Please do not eat 10-12 hours before your appointment if you are coming in fasting for labs on lipids, cholesterol, or glucose (sugar). This does not apply to pregnant women. Water, hot tea and black coffee  (with nothing added) are okay. Do not drink other fluids, diet soda or chew gum.            Nov 03, 2017  4:00 PM CDT   [facility] Lab Draw with  MASONIC LAB DRAW   [facility] Draw ([facility])    [address] [place] [phone]   [phone]            Nov 03, 2017  4:40 PM CDT   (Arrive by 4:25 PM)   Return Visit with Jessica Cox PA-C   [facility] ([facility])    [address] [place] [phone]   [phone]            Nov 06, 2017  6:30 AM CST   [facility] Lab Draw with  MASONIC LAB DRAW   [facility] Draw ([facility])    [address] [place] [phone]   [phone]            Nov 06, 2017  7:00 AM CST   Infusion 360 with  ONCOLOGY INFUSION, UC 10 ATC   [facility] ([facility])    [address] [place] [phone]   [phone]            Nov 10, 2017  4:00 PM CST   [facility] Lab Draw with  MASONIC LAB DRAW   [facility] Draw ([facility])    [address] [place] [phone]   [phone]              Who to contact     If you have questions or need follow up information about today's clinic visit or your schedule please contact [facility] directly at [phone].  Normal or non-critical lab and imaging results will be communicated to you by MyChart, letter or phone within 4 business days after the clinic has received the results. If you do not hear from us within 7 days, please contact the clinic through MyChart or phone. If you have a critical or abnormal lab result, we will notify you by phone as soon as possible.  Submit refill requests through exsulin or call your pharmacy and they will forward the refill request to us. Please allow 3 business days for your  "refill to be completed.          Additional Information About Your Visit        mphoriahart Information     "[employer] gives you secure access to your electronic health record. If you see a primary care provider, you can also send messages to your care team and make appointments. If you have questions, please call your primary care clinic.  If you do not have a primary care provider, please call [phone] and they will assist you.        Care EveryWhere ID     This is your Care EveryWhere ID. This could be used by other organizations to access your [place] medical records  [id number]        Your Vitals Were     Pulse Temperature Respirations Height Pulse Oximetry BMI (Body Mass Index)    131 98.5  F (36.9  C) (Oral) 16 1.651 m (5' 5\") 94% 31.22 kg/m2       Blood Pressure from Last 3 Encounters:   10/31/17 129/74   10/27/17 136/81   10/23/17 137/83    Weight from Last 3 Encounters:   10/31/17 85 kg (187 lb 8 oz)   10/27/17 85.1 kg (187 lb 9.8 oz)   10/23/17 85.9 kg (189 lb 4.8 oz)              We Performed the Following     CBC with platelets differential     Comprehensive metabolic panel     Lactate Dehydrogenase     Phosphorus     Reticulocyte count     Uric acid          Today's Medication Changes          These changes are accurate as of: 10/27/17  8:33 AM.  If you have any questions, ask your nurse or doctor.               These medicines have changed or have updated prescriptions.        Dose/Directions    diazepam 5 MG tablet   Commonly known as:  VALIUM   This may have changed:    - when to take this  - additional instructions   Used for:  Chest wall pain        Dose:  5 mg   Take 1 tablet (5 mg) by mouth 3 times daily as needed For muscle spasms   Quantity:  90 tablet   Refills:  2            Where to get your medicines      These medications were sent to [facility] - [place], [place] - [address]  [address], [facility] [address]     Phone:  [phone]     " lidocaine-prilocaine cream                Primary Care Provider Office Phone # Fax #    Shahla Raul Crawley [phone]       [address] [place] [id number]        Equal Access to Services     RODRIGO ZAMORA : Kevin sacha marley alexo Soesteban, waaxda luqadaha, qaybta kaalmada adeegyada, ranjan padron paresh martin. So [facility] [phone].    ATENCIÓN: Si habla español, tiene a stiles disposición servicios gratuitos de asistencia lingüística. Llame al [phone].    We comply with applicable federal civil rights laws and [place] laws. We do not discriminate on the basis of race, color, national origin, age, disability, sex, sexual orientation, or gender identity.            Thank you!     Thank you for choosing [facility]  for your care. Our goal is always to provide you with excellent care. Hearing back from our patients is one way we can continue to improve our services. Please take a few minutes to complete the written survey that you may receive in the mail after your visit with us. Thank you!             Your Updated Medication List - Protect others around you: Learn how to safely use, store and throw away your medicines at www.disposemymeds.org.          This list is accurate as of: 10/27/17  8:33 AM.  Always use your most recent med list.                   Brand Name Dispense Instructions for use Diagnosis    acyclovir 400 MG tablet    ZOVIRAX    60 tablet    Take 1 tablet (400 mg) by mouth 2 times daily    High grade B-cell lymphoma (H)       allopurinol 300 MG tablet    ZYLOPRIM    30 tablet    Take 1 tablet (300 mg) by mouth daily    High grade B-cell lymphoma (H)       aluminum chloride 20 % external solution    DRYSOL    60 mL    Apply topically At Bedtime    Rash, Intertrigo       AZMACORT IN      Inhale 2 puffs into the lungs as needed        bisacodyl 5 MG EC tablet     30 tablet    Take 3 tablets (15 mg) by mouth daily as needed for  constipation    Constipation, unspecified constipation type       calcitRIOL 0.25 MCG capsule    ROCALTROL    270 capsule    TAKE 2 CAPSULES BY MOUTH EVERY MORNING AND TAKE 1 CAPSULE BY MOUTH EVERY NIGHT AT BEDTIME    Postsurgical hypothyroidism, Papillary carcinoma, follicular variant (H), Metastasis to cervical lymph node (H)       CALCIUM CITRATE +D 315-250 MG-UNIT Tabs per tablet   Generic drug:  calcium citrate-vitamin D     120 tablet    Take 2 tablets by mouth 3 times daily        celecoxib 200 MG capsule    celeBREX    56 capsule    TAKE ONE CAPSULE BY MOUTH TWICE DAILY    Chest wall pain       cetirizine 10 MG tablet    ZYRTEC ALLERGY    30 tablet    Take 1 tablet (10 mg) by mouth 3 times daily On hold for lab test.    High grade B-cell lymphoma (H)       COMPOUND CONTAINING CONTROLLED SUBSTANCE - PHARMACY TO MIX COMPOUNDED MEDICATION    CMPD RX    120 g    Apply small amount to affected area two times daily.    Neoplasm related pain       cromolyn 4 % ophthalmic solution    OPTICROM    10 mL    Place 1 drop into both eyes 4 times daily    Idiopathic mast cell activation syndrome (H)       cyclobenzaprine 10 MG tablet    FLEXERIL    90 tablet    Take 1 tablet (10 mg) by mouth 3 times daily as needed    High grade B-cell lymphoma (H)       dexamethasone 4 MG tablet    DECADRON    5 tablet    Take 8 mg (2 tab) by mouth 11/1 and 11/2, then 4 mg (1 tab) by mouth 11/3.    High grade B-cell lymphoma (H)       diazepam 5 MG tablet    VALIUM    90 tablet    Take 1 tablet (5 mg) by mouth 3 times daily as needed For muscle spasms    Chest wall pain       diclofenac 1 % Gel topical gel    VOLTAREN    100 g    Apply affected area two times daily PRN using enclosed dosing card.    Myofascial pain       diphenhydrAMINE 50 MG capsule    BENADRYL    56 capsule    Take 1 capsule (50 mg) by mouth nightly as needed for itching or sleep    High grade B-cell lymphoma (H)       docusate sodium 100 MG tablet    COLACE    30  tablet    Take 100 mg by mouth daily    Acute constipation       EPINEPHrine 0.3 MG/0.3ML injection 2-pack    EPIPEN 2-CARIDAD    0.6 mL    Inject 0.3 mLs (0.3 mg) into the muscle once as needed for anaphylaxis        ferrous sulfate 325 (65 FE) MG tablet    IRON    90 tablet    Take 1 tablet (325 mg) by mouth 3 times daily (with meals)    Status post bariatric surgery       fluconazole 200 MG tablet    DIFLUCAN    30 tablet    Take 1 tablet (200 mg) by mouth daily    High grade B-cell lymphoma (H)       furosemide 20 MG tablet    LASIX    4 tablet    Take 2 tablets (40 mg) by mouth daily for 2 days    High grade B-cell lymphoma (H)       hydrochlorothiazide 12.5 MG capsule    MICROZIDE    90 capsule    Take 1 capsule (12.5 mg) by mouth daily    Hypocalcemia       levofloxacin 250 MG tablet    LEVAQUIN    30 tablet    Take 1 tablet (250 mg) by mouth daily    High grade B-cell lymphoma (H)       levothyroxine 112 MCG tablet    SYNTHROID/LEVOTHROID    189 tablet    Mon-Sat: (2 tabs daily) Sunday: 3 tabs    Postsurgical hypothyroidism, Papillary carcinoma, follicular variant (H), Postsurgical hypoparathyroidism (H), Thyroid cancer (H)       lidocaine 5 % ointment    XYLOCAINE    35.44 g    SANJANA TOPICALLY QID PRN    Chest wall pain       lidocaine visc 2% 2.5mL/5mL & maalox/mylanta w/ simeth 2.5mL/5mL & diphenhydrAMINE 5mg/5mL Susp suspension    [facility]    360 mL    Swish and spit 10 mLs in mouth every 6 hours as needed for mouth sores    Stomatitis and mucositis, High grade B-cell lymphoma (H)       lidocaine-prilocaine cream    EMLA    30 g    Apply topically as needed (port access pain.)    Neoplasm related pain, Chest wall pain       methocarbamol 750 MG tablet    ROBAXIN    360 tablet    Take 1 tablet (750 mg) by mouth 4 times daily as needed . Ok to take a 5th Robaxin on very severe days.    Myofascial pain       mometasone 110 MCG/INH inhaler    ASMANEX 30 METERED DOSES    3 Inhaler    Inhale 1 puff  into the lungs daily    Intermittent asthma, uncomplicated       montelukast 10 MG tablet    SINGULAIR    120 tablet    TAKE TWO TABLETS BY MOUTH TWICE DAILY    Idiopathic mast cell activation syndrome (H)       * MS CONTIN PO      Take 60 mg by mouth daily Takes at 8pm        * morphine 30 MG 12 hr tablet    MS CONTIN    120 tablet    Take 1 tablet (30 mg) by mouth every 8 hours . Take an addition pill at night such that your evening dose is 60mg    Neoplasm related pain       NASALCROM 5.2 MG/ACT Aers Inhaler   Generic drug:  cromolyn sodium     1 Bottle    SPRAY ONE SPRAY( 1 ML) IN NOSTRIL DAILY    Mast cell disease, systemic       OMEGA 3 PO      Take 5 mLs by mouth        ondansetron 8 MG ODT tab    ZOFRAN ODT    90 tablet    Take 1 tablet (8 mg) by mouth every 8 hours as needed for nausea or vomiting    Nausea with vomiting       * order for DME     2 Units    Equipment being ordered: compression stockings. 20-30 mm or 30 - 40 mm as patient can tolerate. Physical therapy to determine if they should be above or below the knee.    Venous stasis       * order for DME     2 Device    Equipment being ordered: compression bra    Malignant neoplasm of right female breast, unspecified site of breast       * order for DME     1 Units    Compression stockings, medium grade, please measure and fit. Please dispense a pair.    Peripheral edema       oxyCODONE 30 MG IR tablet    ROXICODONE    180 tablet    Take 1 tablet (30 mg) by mouth every 4 hours as needed for moderate to severe pain    High grade B-cell lymphoma (H)       polyethylene glycol powder    MIRALAX    510 g    Take 17 g (1 capful) by mouth daily    Acute constipation       potassium chloride SA 20 MEQ CR tablet    KLOR-CON    90 tablet    Take 1 tablet (20 mEq) by mouth daily    Hypokalemia       PROBIOTIC DAILY PO      Take 1 capsule by mouth daily Lacto acid bifidobacterium    Breast cancer, unspecified laterality, Thyroid cancer (H), Chronic arthralgias  of knees and hips, unspecified laterality       ranitidine 75 MG tablet    ZANTAC    270 tablet    Take 1 tablet (75 mg) by mouth 3 times daily    Mast cell disease, systemic       senna-docusate 8.6-50 MG per tablet    SENOKOT-S;PERICOLACE    60 tablet    Take 1-2 tablets by mouth 2 times daily as needed for constipation    Acute constipation       SUMAtriptan 50 MG tablet    IMITREX    9 tablet    Take 1 tablet (50 mg) by mouth at onset of headache for migraine (may repeat in 2 hours as needed, max 2 tablets daily)    Migraine without status migrainosus, not intractable, unspecified migraine type       tacrolimus 0.1 % ointment    PROTOPIC    60 g    Apply topically 2 times daily    Rash, Intertrigo       triamcinolone 0.025 % ointment    KENALOG    80 g    Apply topically 2 times daily Mix with 1 lb jar of vaseline or aquaphor    Intertrigo, Rash       TUMS 500 MG chewable tablet   Generic drug:  calcium carbonate     180 chew tab    Take 2 tablets (1,000 mg) by mouth nightly as needed for other (cramps)        * UNABLE TO FIND      3 tablets 3 times daily MEDICATION NAME: calcium D-Glucarate  3 caps contain 180mg of elemental calcium.        * UNABLE TO FIND      2 tablets 3 times daily MEDICATION NAME: Natural D-Hist (on hold during chemo)    Breast cancer, unspecified laterality, Papillary carcinoma, follicular variant (H), Chronic musculoskeletal pain       * UNABLE TO FIND      MEDICATION NAME: Tumeric 3 capsules daily (on hold during chemo)        * UNABLE TO FIND      Take 2 capsules by mouth 3 times daily Muscle Mag. 2 caps contain B1 20mg, B2 20mg, B6 10mg, magesium 20mg, manganese 2mg.        * UNABLE TO FIND      2 tablets 3 times daily MEDICATION NAME: Digestzymes    Thyroid cancer (H), Postsurgical hypothyroidism, Postsurgical hypoparathyroidism (H)       * UNABLE TO FIND      1 tablet daily MEDICATION NAME: Pure Encapsulations    Thyroid cancer (H), Postsurgical hypothyroidism, Postsurgical  hypoparathyroidism (H)       VENTOLIN  (90 BASE) MCG/ACT Inhaler   Generic drug:  albuterol     18 g    INHALE 2 PUFFS INTO THE LUNGS EVERY 4 HOURS AS NEEDED FOR SHORTNESS OF BREATH OR DIFFICULT BREATHING OR WHEEZING    Mild intermittent asthma without complication       vitamin D3 2000 UNITS Caps     60 capsule    Take 5,000 Units by mouth daily Takes 2 tabs daily    Thyroid cancer (H), Postsurgical hypothyroidism, Papillary carcinoma, follicular variant (H), Postsurgical hypoparathyroidism (H)       * Notice:  This list has 11 medication(s) that are the same as other medications prescribed for you. Read the directions carefully, and ask your doctor or other care provider to review them with you.

## 2017-10-27 NOTE — NURSING NOTE
"Chief Complaint   Patient presents with     Port Draw     Labs drawn from port by RN. Line flushed with saline and heparin. Vs taken - paged Kenny about HR. Checked pt in for next appt     Port accessed with 20g 3/4\" flat needle by RN, labs collected, line flushed with saline and heparin.  Vitals taken. Pt checked in for appointment(s).    Daly Hooper RN  "

## 2017-10-27 NOTE — PLAN OF CARE
Problem: Patient Care Overview  Goal: Plan of Care/Patient Progress Review  PT-7D: PT eval & treat orders received. Attempted PT eval this pm; patient unavailable due to infusion therapy. Have rescheduled PT eval to 10/28/17.

## 2017-10-27 NOTE — NURSING NOTE
"Oncology Rooming Note    October 27, 2017 7:10 AM   Tisha Arias is a 57 year old female who presents for:    Chief Complaint   Patient presents with     Port Draw     Labs drawn from port by RN. Line flushed with saline and heparin. Vs taken - paged Kenny about HR. Checked pt in for next appt     Oncology Clinic Visit     Return visit related to Lymphoma     Initial Vitals: /81  Pulse 131  Temp 98.5  F (36.9  C) (Oral)  Resp 16  Ht 1.651 m (5' 5\")  Wt 85.1 kg (187 lb 9.8 oz)  SpO2 94%  BMI 31.22 kg/m2 Estimated body mass index is 31.22 kg/(m^2) as calculated from the following:    Height as of this encounter: 1.651 m (5' 5\").    Weight as of this encounter: 85.1 kg (187 lb 9.8 oz). Body surface area is 1.98 meters squared.  Extreme Pain (8) Comment: Data Unavailable   No LMP recorded. Patient has had a hysterectomy.  Allergies reviewed: Yes  Medications reviewed: Yes    Medications: Medication refills not needed today.  Pharmacy name entered into [employer]: [id number] - [place], [place] - [address] [facility]    Clinical concerns: Patient would like to discuss refills. Provider was notified.    10 minutes for nursing intake (face to face time)     Shahla Allison LPN            "

## 2017-10-27 NOTE — H&P
"[facility], [place]    History and Physical  Hematology / Oncology     Date of Admission:  10/27/2017  Date of Service (when I saw the patient): 10/27/17    Assessment & Plan   Tisha Arias is a 57 year old female with high grade DLBCL and PMHx significant for breast cancer s/p bilateral mastectomies & BENJIE/BSO, papillary thyroid cancer s/p total thyroidectomy, chronic chest wall pain, muscle spasms, asthma and h/o Rachael en Y gastric bypass, who presents for Cycle 2 of DA-R-EPOCH.    HEME  # High grade B cell lymphoma. \"Double hit-like\". Primary is Dr. Sauceda. Patient diagnosed August 2017, when she presented with worsening chest and back pain. Found to have a lytic lesion of right 10th rib with extension. Disease localized above the diaphragm in thoracic and paravertebral soft tissue and mediastinal lymphadenopathy. 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 Staging LP and BMBx were negative for lymphoma. S/p Cycle 1 DA-R-EPOCH c/b mucositis and chronic LE peripheral edema. She presents for admission for Cycle 2 DA-R-EPOCH.  -Port-a-cath in place  -CBC and CMP acceptable to proceed, however LDH and uric acid are elevated. Per Dr. Sauceda's request, will order a CT CAP to evaluate for progression. Rituxan has already begun. Will HOLD remainder of chemo until CT CAP performed and evaluated.   -Ordered retic count to evaluate for hemolysis.  -Patient requesting adjustments in her subsequent admissions (i.e admit Thursday afternoon/nasreen) in efforts to miss as little work as possible.    Treatment plan: Cycle 2 DA-R-EPOCH (Day 1-10/27/17).  Today is day 0.  -Rituxan 750 mg (Day 0)  -Prednisone 60 mg  (Day 1-5)  -Etoposide 100 mg CIVI (Day 1-4)  -Vincristine 0.8 mg CIVI (Day 1-4)  -Cyclophosphamide 1500 mg (Day 5)  -LP with IT chemotherapy scheduled Monday " 10/30 @0830. Orders entered.  Premeds: Tylenol and benadryl prior to Rituxan, Zofran (D1-5), Emend (d5), Dex (D6-7).  -Neulasta on discharge.  -Discharge with D6-7 Dexamethasone    #Anemia. 2/2 disease and chemotherapy.  -Transfuse for Hgb <8 and plts <10K.    #Stage 1, ER/CO-positive, HER2-negative breast cancer. Follows with Dr. Crawley. Diagnosed in 2011. Oncotype recurrence score of 11 (7% recurrence risk after 5 years of tamoxifen). S/p bilateral mastectomies and BENJIE/BSO in 2012. For endocrine therapy she was on Arimidex from 8638-2110, then changed to Tamoxifen b/c of arthralgias on Arimidex. Has been on Tamoxifen since 2014 now held while being treated for lymphoma. Last dose 10/5.   -F/U with Dr. Crawley 11/27/17.    # Papillary thyroid cancer. Follows with Dr. Castro. Diagnosed in 2013. S/p total thyroidectomy and CND. Received ETOH treatment August 2014, October 2014 and December 2014. Has post-surgical hypothyroidism.  -Continue PTA Levothyroxine.  -Continue PTA calcitriol.    GI  # H/o Rachael en Y gastric bypass. Likely affecting absorption, thus patient is on multiple supplements.  -Continue Calcium and Magnesium replacements.    Pain  # Chronic chest wall pain after mastectomy.   -Continue MS Contin 30 mg morning and afternoon and 60 mg at night.  -Oxycodone 30 mg q4hrs prn.  -Celebrex 200 BID.   -Lidocaine and Ketoralac creams prn.    #Chronic muscle cramps.   -Continue KCl 20 mEq daily, Robaxin 750 mg QID, Valium 5 mg TID prn, Flexeril prn.    ID  #Anti-infectives.  -Continue PTA acyclovir and fluconazole.  -Will hold Levaquin ppx as patient is not neutropenic. To resume on discharge or when ANC <1000.    MISC  #Lymphedema. Of LE. Improved per patient. Wearing compression stockings.   -Continue PTA HCTZ and Lasix.  -Monitor BMP daily    #Asthma, allergies.   -Continue PTA singular, zyrtec.  -Patient states she is not taking her inhalers. Requests only albuterol prn rescue.    FEN  -IVF per chemo  regimen  -PTA electrolyte replacements, can consider sliding scale if necessary.  -RADT    Prophys  -VTE: Lovenox ppx  -PUD: PTA ranitidine  -Bowels: PTA docusate and miralax     Dispo: Pending completion of chemotherapy and resolution of any acute toxicities, likely 4-5 day stay (~10/ 30-31).    Tiesha Bernard PA-C  Hematology/Oncology  [phone]    Code Status   Full Code    Primary Care Physician   Shahla Crawley    Chief Complaint   Admitted for scheduled chemotherapy with Cycle 2 of DA-R-EPOCH.    History is obtained from the patient and electronic health record    History of Present Illness   Tisha Arias is a 57 year old female with high grade DLBCL and PMHx significant for breast cancer s/p bilateral mastectomies & BENJIE/BSO, papillary thyroid cancer s/p total thyroidectomy, chronic chest wall pain, muscle spasms, asthma and h/o Rachael en Y gastric bypass, who presents for Cycle 2 of DA-R-EPOCH.    Elvin offers minimal complaints. Has felt well outpatient. Denies infectious symptoms. No increase in chronic pain and pain has been well managed on current regimen developed by palliative care team. Bowels are more constipated although this is not new. She was recently treated for significant LE pitting edema (per patient), which has now improved. Otherwise states she feels well and is anxious to begin chemotherapy.    Past Medical History    I have reviewed this patient's medical history and updated it with pertinent information if needed.   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 No real need for Epipen for this alone.     GDM (gestational diabetes mellitus)     w first pregnancy  only     Gestational hypertension      Lymphedema     jackson arms, chest     Migraine      Neuropathy associated with cancer (H)      Papillary carcinoma, follicular variant (H) 2013    pT3, N1, Mx, thyroid     Post-surgical hypothyroidism      Renal disease     kidney stones     Rhinitis, allergic to other allergen      Right Breast mass and microcalcifications 2011     Seasonal allergic conjunctivitis      Seasonal allergic rhinitis 11 skin tests pos. for: dog/M/T/G/W--NEGATIVE FOOD TESTS FOR: shrimp, crab, lobster, coconut       Past Surgical History   I have reviewed this patient's surgical history and updated it with pertinent information if needed.  Past Surgical History:   Procedure Laterality Date     BACK SURGERY  ,    Bulging disc w nerve root impigment     BIOPSY BREAST      right     BIOPSY BREAST  11    right, core and sterotactic     BONE MARROW BIOPSY, BONE SPECIMEN, NEEDLE/TROCAR Left 10/3/2017    Procedure: BIOPSY BONE MARROW;  Bone Marrow Biopsy with aspirate;  Surgeon: Amelia Watkins PA-C;  Location: [facility] OR     BYPASS GASTRIC, CHOLECYSTECTOMY, COMBINED  2007 LAPAROSCOPIC GASTRIC RESTRICTIVE PX, W/GASTRIC BYPASS/ GAMALIEL-EN-Y, < 150CM      Gamaliel en Y?      SECTION       COLONOSCOPY      normal     COSMETIC SURGERY  2012    Final Stage of Mastectomy     DAVINCI HYSTERECTOMY TOTAL, BILATERAL SALPINGO-OOPHORECTOMY, COMBINED  2012    Procedure: COMBINED DAVINCI HYSTERECTOMY TOTAL, SALPINGO-OOPHORECTOMY;  Davinci Total Laparoscopic Hysterectomy, Bilateral Salpingo Oophorectomy, Pelvic Washings, Cystoscopy;  Surgeon: Evie Sheikh MD;  Location: [facility] OR     ENT SURGERY       ESOPHAGOSCOPY, GASTROSCOPY, DUODENOSCOPY (EGD), COMBINED N/A 2017    Procedure: COMBINED ENDOSCOPIC ULTRASOUND, ESOPHAGOSCOPY, GASTROSCOPY, DUODENOSCOPY (EGD), FINE NEEDLE ASPIRATE/BIOPSY;  Esophagogastroduodenoscopy, Endoscopic Ultrasound, with fine needle  biopsy aspirate;  Surgeon: Patrick Robles MD;  Location: [facility] OR     GI SURGERY  11-    Rachael-en Y w cholecystectomy     GYN SURGERY  1/6/89,1/10/92    2 c-sections     HYSTERECTOMY, PAP NO LONGER INDICATED  12/12    DeVinci assister lap hyst with BSO     INSERT PORT VASCULAR ACCESS Right 10/4/2017    Procedure: INSERT PORT VASCULAR ACCESS;  Port Placement;  Surgeon: Jc Goodman PA-C;  Location: [facility] OR     IRRIGATION AND DEBRIDEMENT BREAST  3/1/2012    Procedure:IRRIGATION AND DEBRIDEMENT BREAST; Irrigation and Debridement, Wound Closure Right Breast; Surgeon:JUNG GUILLEN; Location:[facility] OR     MASTECTOMY, RECONSTRUCT BREAST, COMBINED  1/30/2012    Procedure:COMBINED MASTECTOMY, RECONSTRUCT BREAST; Bilateral Mastectomies, Right Axillary Klemme Node Biopsy, Bilateral Breast Reconstruction with Tissue Expanders, Reconstruction of inframammary fold, bilateral pain management systems; Surgeon:ANUPAM CAMPBELL; Location:[facility] OR     RECONSTRUCT BREAST  8/31/2012    Procedure: RECONSTRUCT BREAST;  Bilateral 2nd stage breast reconstruction, revision,       SOFT TISSUE SURGERY  1-30-12    Mastectomy w severe myofascial pain syndrome     THYROIDECTOMY  7/10/2013    Procedure: THYROIDECTOMY;  Total Thyroidectomy with central neck dissection;  Surgeon: Indiana Sneed MD;  Location: [facility] OR     TONSILLECTOMY      childhood       Prior to Admission Medications   Prior to Admission Medications   Prescriptions Last Dose Informant Patient Reported? Taking?   COMPOUND CONTAINING CONTROLLED SUBSTANCE (CMPD RX) - PHARMACY TO MIX COMPOUNDED MEDICATION   No No   Sig: Apply small amount to affected area two times daily.   Cholecalciferol (VITAMIN D3) 2000 UNITS CAPS 10/26/2017 at 2000 Self Yes Yes   Sig: Take 5,000 Units by mouth daily Takes 2 tabs daily   EPINEPHrine (EPIPEN 2-CARIDAD) 0.3 MG/0.3ML injection  Self No No   Sig: Inject 0.3 mLs (0.3 mg) into the muscle once as needed for anaphylaxis   MAGNESIUM  CITRATE PO 10/26/2017 at 1200 Self Yes Yes   Sig: Take 400 mg by mouth daily   Morphine Sulfate (MS CONTIN PO) 10/26/2017 at 2000  Yes Yes   Sig: Take 60 mg by mouth daily Takes at 8pm   NASALCROM 5.2 MG/ACT AERS Inhaler  Self No No   Sig: SPRAY ONE SPRAY( 1 ML) IN NOSTRIL DAILY   Omega-3 Fatty Acids (OMEGA 3 PO)  Self Yes No   Sig: Take 5 mLs by mouth   Probiotic Product (PROBIOTIC DAILY PO)  Self Yes No   Sig: Take 1 capsule by mouth daily Lacto acid bifidobacterium   SUMAtriptan (IMITREX) 50 MG tablet Past Week at Unknown time  No Yes   Sig: Take 1 tablet (50 mg) by mouth at onset of headache for migraine (may repeat in 2 hours as needed, max 2 tablets daily)   Triamcinolone Acetonide (AZMACORT IN) Past Month at Unknown time Self Yes Yes   Sig: Inhale 2 puffs into the lungs as needed   UNABLE TO FIND 10/27/2017 at 0530 Self Yes Yes   Si tablets 3 times daily MEDICATION NAME: Digestzymes    UNABLE TO FIND 10/26/2017 at 2000 Self Yes Yes   Si tablet daily MEDICATION NAME: Pure Encapsulations   UNABLE TO FIND 10/27/2017 at 0530 Self Yes Yes   Sig: 3 tablets 3 times daily MEDICATION NAME: calcium D-Glucarate    UNABLE TO FIND  Self Yes No   Si tablets 3 times daily MEDICATION NAME: Natural D-Hist (on hold during chemo)   UNABLE TO FIND  Self Yes No   Sig: MEDICATION NAME: Tumeric 3 capsules daily (on hold during chemo)   UNABLE TO FIND 10/27/2017 at 0530  Yes Yes   Sig: Take 3 tablets by mouth 3 times daily Muscle Mag   VENTOLIN  (90 BASE) MCG/ACT Inhaler Past Month at Unknown time Self No Yes   Sig: INHALE 2 PUFFS INTO THE LUNGS EVERY 4 HOURS AS NEEDED FOR SHORTNESS OF BREATH OR DIFFICULT BREATHING OR WHEEZING   acyclovir (ZOVIRAX) 400 MG tablet 10/27/2017 at 0530  No Yes   Sig: Take 1 tablet (400 mg) by mouth 2 times daily   allopurinol (ZYLOPRIM) 300 MG tablet 10/27/2017 at 0530  No Yes   Sig: Take 1 tablet (300 mg) by mouth daily   aluminum chloride (DRYSOL) 20 % external solution  Self No No    Sig: Apply topically At Bedtime   calcitRIOL (ROCALTROL) 0.25 MCG capsule 10/27/2017 at 0530  No Yes   Sig: TAKE 2 CAPSULES BY MOUTH EVERY MORNING AND TAKE 1 CAPSULE BY MOUTH EVERY NIGHT AT BEDTIME   Patient taking differently: TAKE 2 CAPSULES BY MOUTH EVERY MORNING AND TAKE 1 CAPSULE BY MOUTH at noon   calcium carbonate (TUMS) 500 MG chewable tablet  Self Yes No   Sig: Take 2 tablets (1,000 mg) by mouth nightly as needed for other (cramps)   calcium citrate-vitamin D (CALCIUM CITRATE +D) 315-250 MG-UNIT TABS 10/27/2017 at 0530 Self Yes Yes   Sig: Take 2 tablets by mouth 3 times daily   celecoxib (CELEBREX) 200 MG capsule 10/27/2017 at 0530  No Yes   Sig: TAKE ONE CAPSULE BY MOUTH TWICE DAILY    cetirizine (ZYRTEC ALLERGY) 10 MG tablet 10/27/2017 at 0530  No Yes   Sig: Take 1 tablet (10 mg) by mouth 3 times daily On hold for lab test.   chlorhexidine (PERIDEX) 0.12 % solution  Self Yes No   cromolyn (OPTICROM) 4 % ophthalmic solution 10/27/2017 at 0530 Self No Yes   Sig: Place 1 drop into both eyes 4 times daily   cyclobenzaprine (FLEXERIL) 10 MG tablet 10/26/2017 at 1200  No Yes   Sig: Take 1 tablet (10 mg) by mouth 3 times daily as needed On hold Dr Monsalve   dexamethasone (DECADRON) 4 MG tablet   No No   Sig: Take 1 tablet (4 mg) by mouth 2 times daily (with meals) Or as directed by MD   diazepam (VALIUM) 5 MG tablet 10/27/2017 at 0530 Self No Yes   Sig: Take 1 tablet (5 mg) by mouth 3 times daily as needed For muscle spasms   Patient taking differently: Take 5 mg by mouth 3 times daily For muscle spasms   diclofenac (VOLTAREN) 1 % GEL 10/26/2017 at 2000 Self No Yes   Sig: Apply affected area two times daily PRN using enclosed dosing card.   diphenhydrAMINE (BENADRYL) 50 MG capsule 10/26/2017 at 2000  No Yes   Sig: Take 1 capsule (50 mg) by mouth nightly as needed for itching or sleep   docusate sodium 100 MG tablet 10/26/2017 at 2000 Self No Yes   Sig: Take 100 mg by mouth daily   ferrous sulfate (IRON) 325 (65  FE) MG tablet 10/27/2017 at 0530  Yes Yes   Sig: Take by mouth 3 times daily (with meals)   ferrous sulfate (IRON) 325 (65 FE) MG tablet   Yes No   Sig: Take 1 tablet (325 mg) by mouth 3 times daily (with meals)   fluconazole (DIFLUCAN) 200 MG tablet 10/26/2017 at 2000  No Yes   Sig: Take 1 tablet (200 mg) by mouth daily   furosemide (LASIX) 20 MG tablet 10/27/2017 at 0800  No Yes   Sig: Take 1 tablet (20 mg) by mouth daily   hydrochlorothiazide (MICROZIDE) 12.5 MG capsule 10/26/2017 at 1200 Self No Yes   Sig: Take 1 capsule (12.5 mg) by mouth daily   levofloxacin (LEVAQUIN) 250 MG tablet 10/26/2017 at 2000  No Yes   Sig: Take 1 tablet (250 mg) by mouth daily   levothyroxine (SYNTHROID/LEVOTHROID) 112 MCG tablet 10/27/2017 at 0530 Self No Yes   Sig: Mon-Sat: (2 tabs daily) Sunday: 3 tabs   Patient taking differently: 112 mcg Mon-Sat: (2 tabs daily) Sunday: 3 tabs   lidocaine (XYLOCAINE) 5 % ointment  Self No No   Sig: SANJANA TOPICALLY QID PRN   lidocaine-prilocaine (EMLA) cream   No No   Sig: Apply topically as needed (port access pain.)   magic mouthwash suspension (diphenhydrAMINE, lidocaine, aluminum-magnesium & simethicone)   No No   Sig: Swish and spit 10 mLs in mouth every 6 hours as needed for mouth sores   methocarbamol (ROBAXIN) 750 MG tablet 10/27/2017 at 0530  No Yes   Sig: Take 1 tablet (750 mg) by mouth 4 times daily as needed . Ok to take a 5th Robaxin on very severe days.   Patient taking differently: Take 750 mg by mouth 4 times daily . Ok to take a 5th Robaxin on very severe days.   mometasone (ASMANEX 30 METERED DOSES) 110 MCG/INH inhaler  Self No No   Sig: Inhale 1 puff into the lungs daily   montelukast (SINGULAIR) 10 MG tablet 10/27/2017 at 0530 Self No Yes   Sig: TAKE TWO TABLETS BY MOUTH TWICE DAILY   morphine (MS CONTIN) 30 MG 12 hr tablet 10/27/2017 at 0530  No Yes   Sig: Take 1 tablet (30 mg) by mouth every 8 hours . Take an addition pill at night such that your evening dose is 60mg    ondansetron (ZOFRAN-ODT) 8 MG ODT tab 10/26/2017 at 2000  No Yes   Sig: Take 1 tablet (8 mg) by mouth every 8 hours as needed for nausea or vomiting   order for DME   No No   Sig: Equipment being ordered: compression stockings. 20-30 mm or 30 - 40 mm as patient can tolerate. Physical therapy to determine if they should be above or below the knee.   order for DME   No No   Sig: Equipment being ordered: compression bra   order for DME   No No   Sig: Compression stockings, medium grade, please measure and fit. Please dispense a pair.   oxyCODONE (ROXICODONE) 30 MG IR tablet 10/26/2017 at 2000  No Yes   Sig: Take 1 tablet (30 mg) by mouth every 4 hours as needed for moderate to severe pain   polyethylene glycol (MIRALAX) powder  Self No No   Sig: Take 17 g by mouth daily   potassium chloride SA (POTASSIUM CHLORIDE) 20 MEQ CR tablet 10/26/2017 at 1200 Self No Yes   Sig: Take 1 tablet (20 mEq) by mouth daily   ranitidine (ZANTAC) 75 MG tablet 10/27/2017 at 0530 Self No Yes   Sig: Take 1 tablet (75 mg) by mouth 3 times daily   tacrolimus (PROTOPIC) 0.1 % ointment 10/26/2017 at 2000 Self No Yes   Sig: Apply topically 2 times daily   tamoxifen (NOLVADEX) 20 MG tablet  Self No No   Sig: Take 1 tablet (20 mg) by mouth daily   triamcinolone (KENALOG) 0.025 % ointment 10/26/2017 at 2000 Self No Yes   Sig: Apply topically 2 times daily Mix with 1 lb jar of vaseline or aquaphor   Patient taking differently: Apply topically 2 times daily Mix with 1 lb jar of vaseline or aquaphor with Tacrolimus ointment      Facility-Administered Medications: None     Allergies   Allergies   Allergen Reactions     Baclofen Other (See Comments) and Unknown     Other reaction(s): Edema, chest pain, seizures.      No Clinical Screening - See Comments Shortness Of Breath, Palpitations, Anaphylaxis, Itching, Swelling, Difficulty breathing and Rash     Sukhjinder wipes- oral allergy -  July 2015: throat tightness from a Chinese herbal medicine Wilmer Blank  Barry Tran     Serotonin Anxiety, Other (See Comments) and Swelling     Seizures      Amitriptyline Hcl Swelling     Birch Trees      Potatoes, carrots, cherries, celery, apple, pears, plums, peaches, parsnip, kiwi, hazelnuts, and apricots,      Blue Dyes Itching     Headaches       Cymbalta Other (See Comments)     Flushing, tremor/muscle twitching and edema     Gabapentin Other (See Comments)     edema  Systemic edema, weaned off from Feb to March per Dr. Dowd.    edema     Grass      Mugwort [Artemisia Vulgaris]      Various spices     Ragweeds      Melons, bananas, cucumbers, zucchini.     Topamax      Nortriptyline Itching, Visual Disturbance, Swelling, GI Disturbance, Anxiety, Other (See Comments) and Nausea     Other reaction(s): Swelling       Social History   I have reviewed this patient's social history and updated it with pertinent information if needed. Tisha Arias  reports that she has never smoked. She has never used smokeless tobacco. She reports that she does not drink alcohol or use illicit drugs.    Family History   I have reviewed this patient's family history and updated it with pertinent information if needed.   Family History   Problem Relation Age of Onset     Allergies Mother      Arthritis Mother      CANCER Mother      uterine/uterine     Hypertension Mother      on HCTZ     Hyperlipidemia Mother      CEREBROVASCULAR DISEASE Mother      s/p brain surgery     Breast Cancer Mother      Depression Mother      Anxiety Disorder Mother      Anesthesia Reaction Mother      Asthma Mother      Skin Cancer Mother      HEART DISEASE Father      DIABETES Father      Coronary Artery Disease Father      Hypertension Father      Hyperlipidemia Father      CEREBROVASCULAR DISEASE Father      Multiple strokes     Obesity Father      DIABETES Brother      Depression Brother      Hypertension Brother      CANCER Maternal Grandfather      pancreatic cancer/stomach cancer     Prostate Cancer Maternal  Grandfather      Prostrate and Bladder     Other Cancer Maternal Grandfather      Pancreatic 1988, Bladder 1977     CANCER Maternal Grandmother      uterine     Breast Cancer Maternal Grandmother      Skin Cancer Maternal Grandmother      CANCER Brother 57     pancreatic cancer     DIABETES Brother      Breast Cancer Cousin      Grand mothers sister     Other Cancer Brother      Stage 3 Pancreatic 2-2015     Depression Brother      Asthma Brother      Obesity Brother      Anesthesia Reaction Other      H/a, itching, drug interactions     Asthma Other      CANCER Brother      Pancreatic      Thyroid Disease No family hx of        Review of Systems   The 10 point Review of Systems is negative other than noted in the HPI or here.     Physical Exam   Temp: 97.1  F (36.2  C) Temp src: Oral BP: 116/81   Heart Rate: 125 Resp: 18 SpO2: 96 % O2 Device: None (Room air)    Vital Signs with Ranges  Temp:  [96.2  F (35.7  C)-97.1  F (36.2  C)] 97.1  F (36.2  C)  Heart Rate:  [125-131] 125  Resp:  [16-18] 18  BP: (116-150)/(81-86) 116/81  SpO2:  [96 %-97 %] 96 %  187 lbs 9.78 oz    Constitutional: Pleasant female seen sitting up in bed, in NAD. Alert and interactive.   HEENT: NCAT, anicteric sclerae, conjunctiva clear. Moist mucous membranes. Dentition intact/well cared for.  Respiratory: Non-labored breathing, good air exchange. Lungs are clear to auscultation bilaterally, without wheezing, crackles or rhonchi. No cough noted.   Cardiovascular: Tachycardic with regular rhythm, no murmur, rub or gallop.  GI: Normoactive BS. Abdomen is soft and non-distended. Mild tenderness to palpation of LUQ and LLQ. No rigidity or guarding.  Skin: Warm and dry. No rashes or lesions on exposed surfaces.  Musculoskeletal: Extremities grossly normal. Bilateral 1+ pitting edema to the knees with tenderness on palpation.  Neurologic: A &O x3, speech normal, answering questions appropriately. Moves all extremities spontaneously. Grossly  non-focal.  Neuropsychiatric: Mentation and affect normal/appropriate.    Data   Results for orders placed or performed in visit on 10/27/17 (from the past 24 hour(s))   CBC with platelets differential   Result Value Ref Range    WBC 10.4 4.0 - 11.0 10e9/L    RBC Count 3.90 3.8 - 5.2 10e12/L    Hemoglobin 10.0 (L) 11.7 - 15.7 g/dL    Hematocrit 33.6 (L) 35.0 - 47.0 %    MCV 86 78 - 100 fl    MCH 25.6 (L) 26.5 - 33.0 pg    MCHC 29.8 (L) 31.5 - 36.5 g/dL    RDW 19.3 (H) 10.0 - 15.0 %    Platelet Count 229 150 - 450 10e9/L    Diff Method Manual Differential     % Neutrophils 89.5 %    % Lymphocytes 6.1 %    % Monocytes 2.6 %    % Eosinophils 0.0 %    % Basophils 0.9 %    % Myelocytes 0.9 %    Nucleated RBCs 1 (H) 0 /100    Absolute Neutrophil 9.3 (H) 1.6 - 8.3 10e9/L    Absolute Lymphocytes 0.6 (L) 0.8 - 5.3 10e9/L    Absolute Monocytes 0.3 0.0 - 1.3 10e9/L    Absolute Eosinophils 0.0 0.0 - 0.7 10e9/L    Absolute Basophils 0.1 0.0 - 0.2 10e9/L    Absolute Myelocytes 0.1 (H) 0 10e9/L    Absolute Nucleated RBC 0.1     Anisocytosis Slight     Poikilocytosis Moderate     Polychromasia Slight     Teardrop Cells Slight     Ovalocytes Slight     Microcytes Present     Basophilic Stipling Present    Comprehensive metabolic panel   Result Value Ref Range    Sodium 140 133 - 144 mmol/L    Potassium 3.8 3.4 - 5.3 mmol/L    Chloride 102 94 - 109 mmol/L    Carbon Dioxide 32 20 - 32 mmol/L    Anion Gap 6 3 - 14 mmol/L    Glucose 112 (H) 70 - 99 mg/dL    Urea Nitrogen 20 7 - 30 mg/dL    Creatinine 0.93 0.52 - 1.04 mg/dL    GFR Estimate 62 >60 mL/min/1.7m2    GFR Estimate If Black 75 >60 mL/min/1.7m2    Calcium 8.0 (L) 8.5 - 10.1 mg/dL    Bilirubin Total 0.3 0.2 - 1.3 mg/dL    Albumin 3.0 (L) 3.4 - 5.0 g/dL    Protein Total 5.7 (L) 6.8 - 8.8 g/dL    Alkaline Phosphatase 77 40 - 150 U/L    ALT 53 (H) 0 - 50 U/L    AST 29 0 - 45 U/L   Lactate Dehydrogenase   Result Value Ref Range    Lactate Dehydrogenase 427 (H) 81 - 234 U/L   Uric  acid   Result Value Ref Range    Uric Acid 6.3 (H) 2.6 - 6.0 mg/dL   Phosphorus   Result Value Ref Range    Phosphorus 3.0 2.5 - 4.5 mg/dL     *Note: Due to a large number of results and/or encounters for the requested time period, some results have not been displayed. A complete set of results can be found in Results Review.

## 2017-10-27 NOTE — PROGRESS NOTES
DATE/TIME  (DOT-TD, DOT-NOW) CHEMO CHECK ACTIVITY (REGIMEN & DOSE CHECK, DAY, DOSE #, NAME OF CHEMO #1)  CHEMO DRUG #2  CHEMO DRUG #3 NAME OF RN #1 (USE DOT-ME HERE) NAME OF RN#2 (2ND RN TO LOG IN SEPARATELY)   10/27/2017  11:23 AM   Chemo Protocol & labs check   Jyoti DUPREEJonatan Aye   Viraphet Xaphakdy Chanthavixay         10/27/2017  8:22 PM Etoposide Dose #1 Vincristine/Doxorubicin Dose #1  William Simon     10/28/2017 Etoposide Dose #2 Vincristine/Doxorubicin Dose #2  William Archibald     10/29/2017  6:28 PM Etoposide Dose #3 Vincristine/Doxorubicin Dose #3  William Jordan (Lyndsey)   10/30/2017  6:02 PM   Etoposide #3 vinristine/dox #3  Veronica Hernandez     10/31/17  3:52 PM   Cytoxan #1   Delbert Farias

## 2017-10-27 NOTE — LETTER
Transition Communication Hand-off for Care Transitions to Next Level of Care Provider    Name: Tisha Arias  MRN #: [id number]  Primary Care Provider: Shahla Crawley  Primary Clinic: [address] [facility] [address]  [facility] [address]    Hematologist: Dr. Sauceda/[facility]     Reason for Hospitalization:  LYMPHOMA  DLBCL (diffuse large B cell lymphoma) (H)  Admit Date/Time: 10/27/2017  8:33 AM  Discharge Date: 10/31/2017  Payor Source: Payor: BCBS / Plan: [place]BS [place] / Product Type: Indemnity /     Tisha Arias is a 57 year old female with high grade DLBCL and PMHx significant for breast cancer s/p bilateral mastectomies & BENJIE/BSO, papillary thyroid cancer s/p total thyroidectomy, chronic chest wall pain, muscle spasms, asthma and h/o Rachael en Y gastric bypass, who presents for Cycle 2 of DA-R-EPOCH.    Discharge Plan:  Home with clinic follow up     Discharge Needs Assessment:  Needs       Most Recent Value    Equipment Currently Used at Home none    Transportation Available car, family or friend will provide        Follow-up plan:  Future Appointments  Date Time Provider Department Center   11/1/2017 6:00 AM [facility]   11/2/2017 4:00 PM  MASONIC LAB DRAW [facility]   11/2/2017 4:30 PM Nurse,  Oncology Injection [facility]   11/2/2017 5:00 PM [facility]   11/2/2017 6:00 PM  LAB UCLAB [facility]   11/3/2017 4:00 PM  MASONIC LAB DRAW [facility]   11/3/2017 4:40 PM Jessica Cox PA-C [facility]   11/6/2017 6:30 AM  MASONIC LAB DRAW [facility]   11/10/2017 12:45 PM  MASONIC LAB DRAW [facility]   11/10/2017 1:20 PM Jessica Cox PA-C [facility]   11/13/2017 4:00 PM  MASONIC LAB DRAW [facility]   11/13/2017 4:30 PM Avelina Castro MD [facility]   11/17/2017 9:00 AM  MASONIC LAB DRAW ONL [facility]   11/17/2017 9:30 AM Jessica Cox PA-C [facility]   11/27/2017 4:00 PM Shahla Crawley MD [facility]       Tejal  Sunita Schrader, RNCC  Phone [phone]  Pager [phone]    AVS/Discharge Summary is the source of truth; this is a helpful guide for improved communication of patient story

## 2017-10-27 NOTE — IP AVS SNAPSHOT
[address] [address]    Phone:  [phone]                                       After Visit Summary   10/27/2017    Tisha Arias    MRN: [id number]           After Visit Summary Signature Page     I have received my discharge instructions, and my questions have been answered. I have discussed any challenges I see with this plan with the nurse or doctor.    ..........................................................................................................................................  Patient/Patient Representative Signature      ..........................................................................................................................................  Patient Representative Print Name and Relationship to Patient    ..................................................               ................................................  Date                                            Time    ..........................................................................................................................................  Reviewed by Signature/Title    ...................................................              ..............................................  Date                                                            Time

## 2017-10-28 ENCOUNTER — APPOINTMENT (OUTPATIENT)
Dept: PHYSICAL THERAPY | Facility: CLINIC | Age: 57
End: 2017-10-28
Attending: INTERNAL MEDICINE
Payer: COMMERCIAL

## 2017-10-28 LAB
ANION GAP SERPL CALCULATED.3IONS-SCNC: 10 MMOL/L (ref 3–14)
BASOPHILS # BLD AUTO: 0 10E9/L (ref 0–0.2)
BASOPHILS NFR BLD AUTO: 0.3 %
BUN SERPL-MCNC: 14 MG/DL (ref 7–30)
CALCIUM SERPL-MCNC: 7.6 MG/DL (ref 8.5–10.1)
CHLORIDE SERPL-SCNC: 103 MMOL/L (ref 94–109)
CO2 SERPL-SCNC: 28 MMOL/L (ref 20–32)
CREAT SERPL-MCNC: 0.9 MG/DL (ref 0.52–1.04)
DIFFERENTIAL METHOD BLD: ABNORMAL
EOSINOPHIL # BLD AUTO: 0 10E9/L (ref 0–0.7)
EOSINOPHIL NFR BLD AUTO: 0 %
ERYTHROCYTE [DISTWIDTH] IN BLOOD BY AUTOMATED COUNT: 19.7 % (ref 10–15)
GFR SERPL CREATININE-BSD FRML MDRD: 65 ML/MIN/1.7M2
GLUCOSE SERPL-MCNC: 162 MG/DL (ref 70–99)
HCT VFR BLD AUTO: 31.2 % (ref 35–47)
HGB BLD-MCNC: 9.2 G/DL (ref 11.7–15.7)
IMM GRANULOCYTES # BLD: 0.2 10E9/L (ref 0–0.4)
IMM GRANULOCYTES NFR BLD: 2.4 %
INR PPP: 0.94 (ref 0.86–1.14)
LDH SERPL L TO P-CCNC: 337 U/L (ref 81–234)
LYMPHOCYTES # BLD AUTO: 0.3 10E9/L (ref 0.8–5.3)
LYMPHOCYTES NFR BLD AUTO: 3.1 %
MCH RBC QN AUTO: 25.6 PG (ref 26.5–33)
MCHC RBC AUTO-ENTMCNC: 29.5 G/DL (ref 31.5–36.5)
MCV RBC AUTO: 87 FL (ref 78–100)
MONOCYTES # BLD AUTO: 0.1 10E9/L (ref 0–1.3)
MONOCYTES NFR BLD AUTO: 1 %
NEUTROPHILS # BLD AUTO: 9.3 10E9/L (ref 1.6–8.3)
NEUTROPHILS NFR BLD AUTO: 93.2 %
NRBC # BLD AUTO: 0 10*3/UL
NRBC BLD AUTO-RTO: 0 /100
PHOSPHATE SERPL-MCNC: 4.6 MG/DL (ref 2.5–4.5)
PLATELET # BLD AUTO: 234 10E9/L (ref 150–450)
POTASSIUM SERPL-SCNC: 3.6 MMOL/L (ref 3.4–5.3)
RBC # BLD AUTO: 3.6 10E12/L (ref 3.8–5.2)
SODIUM SERPL-SCNC: 141 MMOL/L (ref 133–144)
URATE SERPL-MCNC: 5.8 MG/DL (ref 2.6–6)
WBC # BLD AUTO: 9.9 10E9/L (ref 4–11)

## 2017-10-28 PROCEDURE — 80048 BASIC METABOLIC PNL TOTAL CA: CPT | Performed by: PHYSICIAN ASSISTANT

## 2017-10-28 PROCEDURE — 97530 THERAPEUTIC ACTIVITIES: CPT | Mod: GP

## 2017-10-28 PROCEDURE — 85025 COMPLETE CBC W/AUTO DIFF WBC: CPT | Performed by: PHYSICIAN ASSISTANT

## 2017-10-28 PROCEDURE — S0169 CALCITROL: HCPCS | Performed by: INTERNAL MEDICINE

## 2017-10-28 PROCEDURE — S0169 CALCITROL: HCPCS | Performed by: PHYSICIAN ASSISTANT

## 2017-10-28 PROCEDURE — 36592 COLLECT BLOOD FROM PICC: CPT | Performed by: PHYSICIAN ASSISTANT

## 2017-10-28 PROCEDURE — 97161 PT EVAL LOW COMPLEX 20 MIN: CPT | Mod: GP

## 2017-10-28 PROCEDURE — 40000193 ZZH STATISTIC PT WARD VISIT

## 2017-10-28 PROCEDURE — 25000128 H RX IP 250 OP 636: Performed by: PHYSICIAN ASSISTANT

## 2017-10-28 PROCEDURE — 25000131 ZZH RX MED GY IP 250 OP 636 PS 637: Performed by: PHYSICIAN ASSISTANT

## 2017-10-28 PROCEDURE — 99232 SBSQ HOSP IP/OBS MODERATE 35: CPT | Mod: GC | Performed by: INTERNAL MEDICINE

## 2017-10-28 PROCEDURE — 84100 ASSAY OF PHOSPHORUS: CPT | Performed by: PHYSICIAN ASSISTANT

## 2017-10-28 PROCEDURE — 12000008 ZZH R&B INTERMEDIATE UMMC

## 2017-10-28 PROCEDURE — 25000132 ZZH RX MED GY IP 250 OP 250 PS 637: Performed by: INTERNAL MEDICINE

## 2017-10-28 PROCEDURE — 85610 PROTHROMBIN TIME: CPT | Performed by: PHYSICIAN ASSISTANT

## 2017-10-28 PROCEDURE — 83615 LACTATE (LD) (LDH) ENZYME: CPT | Performed by: PHYSICIAN ASSISTANT

## 2017-10-28 PROCEDURE — 25000132 ZZH RX MED GY IP 250 OP 250 PS 637: Performed by: STUDENT IN AN ORGANIZED HEALTH CARE EDUCATION/TRAINING PROGRAM

## 2017-10-28 PROCEDURE — 25000125 ZZHC RX 250: Performed by: PHYSICIAN ASSISTANT

## 2017-10-28 PROCEDURE — 25000132 ZZH RX MED GY IP 250 OP 250 PS 637: Performed by: PHYSICIAN ASSISTANT

## 2017-10-28 PROCEDURE — 97110 THERAPEUTIC EXERCISES: CPT | Mod: GP

## 2017-10-28 PROCEDURE — 84550 ASSAY OF BLOOD/URIC ACID: CPT | Performed by: PHYSICIAN ASSISTANT

## 2017-10-28 RX ORDER — SENNOSIDES 8.6 MG
8.6 TABLET ORAL 2 TIMES DAILY
Status: DISCONTINUED | OUTPATIENT
Start: 2017-10-28 | End: 2017-10-29

## 2017-10-28 RX ORDER — FUROSEMIDE 20 MG
20 TABLET ORAL 2 TIMES DAILY
Status: DISCONTINUED | OUTPATIENT
Start: 2017-10-28 | End: 2017-10-29

## 2017-10-28 RX ADMIN — FLUCONAZOLE 200 MG: 200 TABLET ORAL at 19:28

## 2017-10-28 RX ADMIN — RANITIDINE 75 MG: 75 TABLET, FILM COATED ORAL at 05:30

## 2017-10-28 RX ADMIN — CALCITRIOL 0.5 MCG: 0.5 CAPSULE, LIQUID FILLED ORAL at 05:30

## 2017-10-28 RX ADMIN — LIDOCAINE: 50 OINTMENT TOPICAL at 09:22

## 2017-10-28 RX ADMIN — METHOCARBAMOL 750 MG: 750 TABLET ORAL at 19:30

## 2017-10-28 RX ADMIN — RANITIDINE 75 MG: 75 TABLET, FILM COATED ORAL at 19:29

## 2017-10-28 RX ADMIN — CROMOLYN SODIUM 1 DROP: 40 SOLUTION/ DROPS OPHTHALMIC at 05:31

## 2017-10-28 RX ADMIN — CROMOLYN SODIUM 1 DROP: 40 SOLUTION/ DROPS OPHTHALMIC at 19:36

## 2017-10-28 RX ADMIN — ONDANSETRON HYDROCHLORIDE 16 MG: 8 TABLET, FILM COATED ORAL at 19:30

## 2017-10-28 RX ADMIN — LEVOTHYROXINE SODIUM 224 MCG: 112 TABLET ORAL at 05:30

## 2017-10-28 RX ADMIN — ALLOPURINOL 300 MG: 300 TABLET ORAL at 05:30

## 2017-10-28 RX ADMIN — FUROSEMIDE 20 MG: 20 TABLET ORAL at 05:30

## 2017-10-28 RX ADMIN — ACYCLOVIR 400 MG: 400 TABLET ORAL at 05:30

## 2017-10-28 RX ADMIN — CROMOLYN SODIUM 1 DROP: 40 SOLUTION/ DROPS OPHTHALMIC at 16:21

## 2017-10-28 RX ADMIN — OXYCODONE HYDROCHLORIDE 30 MG: 30 TABLET ORAL at 19:30

## 2017-10-28 RX ADMIN — CELECOXIB 200 MG: 200 CAPSULE ORAL at 07:30

## 2017-10-28 RX ADMIN — SENNOSIDES 8.6 MG: 8.6 TABLET, FILM COATED ORAL at 19:29

## 2017-10-28 RX ADMIN — CROMOLYN SODIUM 1 DROP: 40 SOLUTION/ DROPS OPHTHALMIC at 12:28

## 2017-10-28 RX ADMIN — SENNOSIDES 8.6 MG: 8.6 TABLET, FILM COATED ORAL at 14:03

## 2017-10-28 RX ADMIN — DIAZEPAM 5 MG: 5 TABLET ORAL at 05:30

## 2017-10-28 RX ADMIN — FERROUS SULFATE TAB 325 MG (65 MG ELEMENTAL FE) 325 MG: 325 (65 FE) TAB at 12:25

## 2017-10-28 RX ADMIN — CETIRIZINE HYDROCHLORIDE 10 MG: 10 TABLET, FILM COATED ORAL at 05:30

## 2017-10-28 RX ADMIN — OXYCODONE HYDROCHLORIDE 30 MG: 30 TABLET ORAL at 23:24

## 2017-10-28 RX ADMIN — OXYCODONE HYDROCHLORIDE 30 MG: 30 TABLET ORAL at 09:22

## 2017-10-28 RX ADMIN — DIAZEPAM 5 MG: 5 TABLET ORAL at 18:17

## 2017-10-28 RX ADMIN — POTASSIUM CHLORIDE 20 MEQ: 750 TABLET, EXTENDED RELEASE ORAL at 12:25

## 2017-10-28 RX ADMIN — OXYCODONE HYDROCHLORIDE 30 MG: 30 TABLET ORAL at 15:14

## 2017-10-28 RX ADMIN — ENOXAPARIN SODIUM 40 MG: 40 INJECTION SUBCUTANEOUS at 19:28

## 2017-10-28 RX ADMIN — OXYCODONE HYDROCHLORIDE 30 MG: 30 TABLET ORAL at 00:26

## 2017-10-28 RX ADMIN — CETIRIZINE HYDROCHLORIDE 10 MG: 10 TABLET, FILM COATED ORAL at 19:29

## 2017-10-28 RX ADMIN — CYCLOBENZAPRINE HYDROCHLORIDE 10 MG: 10 TABLET, FILM COATED ORAL at 18:17

## 2017-10-28 RX ADMIN — MONTELUKAST SODIUM 20 MG: 10 TABLET, FILM COATED ORAL at 05:30

## 2017-10-28 RX ADMIN — MORPHINE SULFATE 60 MG: 60 TABLET, EXTENDED RELEASE ORAL at 19:30

## 2017-10-28 RX ADMIN — POLYETHYLENE GLYCOL 3350 17 G: 17 POWDER, FOR SOLUTION ORAL at 21:01

## 2017-10-28 RX ADMIN — METHOCARBAMOL 750 MG: 750 TABLET ORAL at 12:25

## 2017-10-28 RX ADMIN — MONTELUKAST SODIUM 20 MG: 10 TABLET, FILM COATED ORAL at 19:28

## 2017-10-28 RX ADMIN — FERROUS SULFATE TAB 325 MG (65 MG ELEMENTAL FE) 325 MG: 325 (65 FE) TAB at 05:30

## 2017-10-28 RX ADMIN — DOCUSATE SODIUM 100 MG: 100 CAPSULE, LIQUID FILLED ORAL at 19:28

## 2017-10-28 RX ADMIN — DICLOFENAC SODIUM 2 G: 10 GEL TOPICAL at 09:23

## 2017-10-28 RX ADMIN — CELECOXIB 200 MG: 200 CAPSULE ORAL at 19:28

## 2017-10-28 RX ADMIN — METHOCARBAMOL 750 MG: 750 TABLET ORAL at 16:21

## 2017-10-28 RX ADMIN — PREDNISONE 60 MG: 50 TABLET ORAL at 19:30

## 2017-10-28 RX ADMIN — PREDNISONE 60 MG: 50 TABLET ORAL at 07:30

## 2017-10-28 RX ADMIN — LIDOCAINE: 50 OINTMENT TOPICAL at 18:17

## 2017-10-28 RX ADMIN — CALCITRIOL 0.5 MCG: 0.5 CAPSULE, LIQUID FILLED ORAL at 12:25

## 2017-10-28 RX ADMIN — CETIRIZINE HYDROCHLORIDE 10 MG: 10 TABLET, FILM COATED ORAL at 12:35

## 2017-10-28 RX ADMIN — MORPHINE SULFATE 30 MG: 30 TABLET, EXTENDED RELEASE ORAL at 12:35

## 2017-10-28 RX ADMIN — OXYCODONE HYDROCHLORIDE 30 MG: 30 TABLET ORAL at 04:24

## 2017-10-28 RX ADMIN — FUROSEMIDE 20 MG: 20 TABLET ORAL at 14:03

## 2017-10-28 RX ADMIN — MORPHINE SULFATE 30 MG: 30 TABLET, EXTENDED RELEASE ORAL at 05:30

## 2017-10-28 RX ADMIN — DOXORUBICIN HYDROCHLORIDE 0.8 MG: 2 INJECTION, SOLUTION INTRAVENOUS at 22:22

## 2017-10-28 RX ADMIN — METHOCARBAMOL 750 MG: 750 TABLET ORAL at 05:36

## 2017-10-28 RX ADMIN — ACYCLOVIR 400 MG: 400 TABLET ORAL at 19:28

## 2017-10-28 RX ADMIN — FERROUS SULFATE TAB 325 MG (65 MG ELEMENTAL FE) 325 MG: 325 (65 FE) TAB at 19:29

## 2017-10-28 RX ADMIN — RANITIDINE 75 MG: 75 TABLET, FILM COATED ORAL at 12:35

## 2017-10-28 RX ADMIN — ETOPOSIDE 100 MG: 20 INJECTION, SOLUTION, CONCENTRATE INTRAVENOUS at 22:11

## 2017-10-28 RX ADMIN — HYDROCHLOROTHIAZIDE 12.5 MG: 12.5 CAPSULE ORAL at 12:25

## 2017-10-28 ASSESSMENT — PAIN DESCRIPTION - DESCRIPTORS
DESCRIPTORS: ACHING
DESCRIPTORS: ACHING
DESCRIPTORS: HEADACHE
DESCRIPTORS: ACHING
DESCRIPTORS: HEADACHE

## 2017-10-28 NOTE — PLAN OF CARE
Problem: Patient Care Overview  Goal: Plan of Care/Patient Progress Review  Outcome: No Change  [id number]: AVSS, afebrile. Reports HA and back/shoulder pain, Oxycodone x2 given, MS Contin given, Valium given, some relief. Denies nausea. CIVI chemo infusing, good blood return. Chest CT shows moderate stool amount, pt had smear of BM overnight, passing gas, continue with stool softeners. Voiding approrptiately, urine color and texture consistent with orange juice, continue to watch, encourage PO intake, pt on lasix daily. Had some sleep in between cares. Independent. Continue with POC.

## 2017-10-28 NOTE — PLAN OF CARE
Problem: Patient Care Overview  Goal: Plan of Care/Patient Progress Review  Outcome: No Change  AVSS, pt c/o ongoing pain in chest, abdomen, partially controlled w scheduled MS Contin and PRN q4 oxycodone. Pt wishes to be woken up to get PRN pain meds when they are available through the night. Rituximab infused w/o incident, EPOCH held pending CT; pt got CT; scan reviwed by Dr. Sheth who ok'd to start EPOCH: dose #1 etoposide, vincristine/doxorubicin infusing now to Port (see chemo note). Pt would like acetaminophen added to med list (passing on to nights). Pt amulated in barr; up independent. Continue POC.

## 2017-10-28 NOTE — PLAN OF CARE
Problem: Chemotherapy Effects (Adult)  Goal: Signs and Symptoms of Listed Potential Problems Will be Absent, Minimized or Managed (Chemotherapy Effects)  Signs and symptoms of listed potential problems will be absent, minimized or managed by discharge/transition of care (reference Chemotherapy Effects (Adult) CPG).  Outcome: No Change  Chemotherapy infusing without problems. Good blood return from PICC  Line. Oxycodone for generalized pain with some relief. Denies nausea. Up independently. Feeling upset about chemotherapy getting started later than she thought it was suppose to yesterday. Given emotional support.

## 2017-10-28 NOTE — PROGRESS NOTES
Nursing Focus: Chemotherapy  D: Positive blood return via Port. Insertion site is clean/dry/intact, dressing intact with no complaints of pain.  Urine output is recorded in intake in Doc Flowsheet.    I: Premedications given per order (see electronic medical administration record). Dose #1 of rituximab infused w/ max rate of 350ml; no reaction. After delay d/t CT scan, Dose #1 Etoposide and dose #1 vincristine/doxorubicin started to infuse over 23 hours instead of 24 hours as per note in springboard. . Reviewed pt teaching on chemotherapy side effects.  Pt denies need for further teaching. Chemotherapy double checked per protocol by two chemotherapy competent RN's.   A: Tolerating procedure well. Denies nausea and or pain.   P: Continue to monitor urine output and symptoms of nausea. Screen for symptoms of toxicity.

## 2017-10-28 NOTE — PROGRESS NOTES
" 10/28/17 0800   Quick Adds   Type of Visit Initial PT Evaluation   Living Environment   Lives With spouse;child(janice), adult   Living Arrangements house   Home Accessibility bed and bath are not on the first floor;stairs to enter home;stairs within home;tub/shower is not walk in   Number of Stairs to Enter Home 2   Number of Stairs Within Home 8  (4 level split with 3 sets of 8 stairs)   Stair Railings at Home inside, present at both sides   Transportation Available car;family or friend will provide   Living Environment Comment PT lives in a 4 level rambler home with her spouse and 2 adult children will visit. States there are 3 sets of 8 stairs in the home and pt needs to use all levels of the home to complete daily cares.   Self-Care   Dominant Hand right   Usual Activity Tolerance fair   Current Activity Tolerance fair   Regular Exercise no   Equipment Currently Used at Home none   Activity/Exercise/Self-Care Comment Pt was not exercising regularly but states she was preparing to begin using her treadmill at home   Functional Level Prior   Ambulation 0-->independent   Transferring 0-->independent   Toileting 0-->independent   Bathing 0-->independent   Dressing 0-->independent   Eating 0-->independent   Communication 0-->understands/communicates without difficulty   Swallowing 0-->swallows foods/liquids without difficulty   Cognition 0 - no cognition issues reported   Fall history within last six months no   Which of the above functional risks had a recent onset or change? (fatigue)   Prior Functional Level Comment Pt was independent with all ADLs and mobility; endorses occasional difficulty with word finding.   General Information   Onset of Illness/Injury or Date of Surgery - Date 10/27/17   Referring Physician Tiesha Bernard, FABIO   Patient/Family Goals Statement Pt states \"I want to stay functioning at my current or higher rate'   Pertinent History of Current Problem (include personal factors and/or " comorbidities that impact the POC) Tisha Arias is a 57 year old female with high grade DLBCL and PMHx significant for breast cancer s/p bilateral mastectomies & BENJIE/BSO, papillary thyroid cancer s/p total thyroidectomy, chronic chest wall pain, muscle spasms, asthma and h/o Rachael en Y gastric bypass, who presents for Cycle 2 of DA-R-EPOCH.   Precautions/Limitations no known precautions/limitations   Weight-Bearing Status - LUE full weight-bearing   Weight-Bearing Status - RUE full weight-bearing   Weight-Bearing Status - LLE full weight-bearing   Weight-Bearing Status - RLE full weight-bearing   General Observations port present, on RA.   General Info Comments Activity: Up ad inocencio   Cognitive Status Examination   Orientation orientation to person, place and time   Level of Consciousness alert   Follows Commands and Answers Questions 100% of the time   Personal Safety and Judgment intact   Memory impaired  (occasional word finding difficulty)   Pain Assessment   Patient Currently in Pain Yes, see Vital Sign flowsheet  (pain medication helps)   Integumentary/Edema   Integumentary/Edema no deficits were identifed   Posture    Posture Protracted shoulders   Range of Motion (ROM)   ROM Comment wfl per observation - BLE/BUE   Strength   Strength Comments >3+/5 BUE/BLE per observation   Bed Mobility   Bed Mobility Comments independent   Transfer Skills   Transfer Comments independent   Gait   Gait Comments independent   Balance   Balance Comments independent up in room   Sensory Examination   Sensory Perception Comments denies sensation changes   General Therapy Interventions   Planned Therapy Interventions balance training;strengthening;home program guidelines;progressive activity/exercise   Clinical Impression   Criteria for Skilled Therapeutic Intervention yes, treatment indicated   PT Diagnosis reduced functional activity tolerance   Influenced by the following impairments medical condition, chemotherapy,  "decreased activity tolerance   Functional limitations due to impairments limited activity and fatigue   Clinical Presentation Evolving/Changing   Clinical Presentation Rationale pt presentation, clinical reasoning   Clinical Decision Making (Complexity) Low complexity   Therapy Frequency` 3 times/week   Predicted Duration of Therapy Intervention (days/wks) 1 week   Anticipated Equipment Needs at Discharge (none)   Anticipated Discharge Disposition Home with Assist;Home;Home with Outpatient Therapy   Risk & Benefits of therapy have been explained Yes   Patient, Family & other staff in agreement with plan of care Yes   Clinical Impression Comments Eval complete, treatment initiated   [facility] TM \"6 Clicks\"   2016, Trustees of [facility], under license to [employer].  All rights reserved.   6 Clicks Short Forms Basic Mobility Inpatient Short Form   [facility]  \"6 Clicks\" V.2 Basic Mobility Inpatient Short Form   1. Turning from your back to your side while in a flat bed without using bedrails? 4 - None   2. Moving from lying on your back to sitting on the side of a flat bed without using bedrails? 4 - None   3. Moving to and from a bed to a chair (including a wheelchair)? 4 - None   4. Standing up from a chair using your arms (e.g., wheelchair, or bedside chair)? 4 - None   5. To walk in hospital room? 4 - None   6. Climbing 3-5 steps with a railing? 4 - None   Basic Mobility Raw Score (Score out of 24.Lower scores equate to lower levels of function) 24   Total Evaluation Time   Total Evaluation Time (Minutes) 10     "

## 2017-10-28 NOTE — PLAN OF CARE
Problem: Patient Care Overview  Goal: Plan of Care/Patient Progress Review  Discharge Planner PT 7D evaluation   Patient plan for discharge: home   Current status: pt is independent with all mobility; endorses fatigue however. Tolerates 15 minutes treadmill activity at level 2.0 MPH.  Barriers to return to prior living situation: fatigue  Recommendations for discharge: home with assist as needed; potentially OP Cancer Rehab to progress activity tolerance  Rationale for recommendations: pt is independent and endorses fatigue with activity.       Entered by: Avinash Krueger 10/28/2017 9:09 AM

## 2017-10-29 ENCOUNTER — APPOINTMENT (OUTPATIENT)
Dept: PHYSICAL THERAPY | Facility: CLINIC | Age: 57
End: 2017-10-29
Attending: INTERNAL MEDICINE
Payer: COMMERCIAL

## 2017-10-29 LAB
ANION GAP SERPL CALCULATED.3IONS-SCNC: 8 MMOL/L (ref 3–14)
BASOPHILS # BLD AUTO: 0 10E9/L (ref 0–0.2)
BASOPHILS NFR BLD AUTO: 0.1 %
BUN SERPL-MCNC: 14 MG/DL (ref 7–30)
CALCIUM SERPL-MCNC: 7.7 MG/DL (ref 8.5–10.1)
CHLORIDE SERPL-SCNC: 100 MMOL/L (ref 94–109)
CO2 SERPL-SCNC: 29 MMOL/L (ref 20–32)
CREAT SERPL-MCNC: 0.82 MG/DL (ref 0.52–1.04)
DIFFERENTIAL METHOD BLD: ABNORMAL
EOSINOPHIL # BLD AUTO: 0 10E9/L (ref 0–0.7)
EOSINOPHIL NFR BLD AUTO: 0 %
ERYTHROCYTE [DISTWIDTH] IN BLOOD BY AUTOMATED COUNT: 19.9 % (ref 10–15)
GFR SERPL CREATININE-BSD FRML MDRD: 72 ML/MIN/1.7M2
GLUCOSE SERPL-MCNC: 155 MG/DL (ref 70–99)
HCT VFR BLD AUTO: 27.4 % (ref 35–47)
HGB BLD-MCNC: 8.2 G/DL (ref 11.7–15.7)
IMM GRANULOCYTES # BLD: 0.1 10E9/L (ref 0–0.4)
IMM GRANULOCYTES NFR BLD: 0.9 %
INR PPP: 1.02 (ref 0.86–1.14)
LDH SERPL L TO P-CCNC: 320 U/L (ref 81–234)
LYMPHOCYTES # BLD AUTO: 0.4 10E9/L (ref 0.8–5.3)
LYMPHOCYTES NFR BLD AUTO: 3.9 %
MCH RBC QN AUTO: 25.9 PG (ref 26.5–33)
MCHC RBC AUTO-ENTMCNC: 29.9 G/DL (ref 31.5–36.5)
MCV RBC AUTO: 87 FL (ref 78–100)
MONOCYTES # BLD AUTO: 0.3 10E9/L (ref 0–1.3)
MONOCYTES NFR BLD AUTO: 2.7 %
NEUTROPHILS # BLD AUTO: 8.8 10E9/L (ref 1.6–8.3)
NEUTROPHILS NFR BLD AUTO: 92.4 %
NRBC # BLD AUTO: 0 10*3/UL
NRBC BLD AUTO-RTO: 0 /100
PHOSPHATE SERPL-MCNC: 4.1 MG/DL (ref 2.5–4.5)
PLATELET # BLD AUTO: 201 10E9/L (ref 150–450)
POTASSIUM SERPL-SCNC: 3.4 MMOL/L (ref 3.4–5.3)
RBC # BLD AUTO: 3.16 10E12/L (ref 3.8–5.2)
SODIUM SERPL-SCNC: 137 MMOL/L (ref 133–144)
URATE SERPL-MCNC: 5.2 MG/DL (ref 2.6–6)
WBC # BLD AUTO: 9.5 10E9/L (ref 4–11)

## 2017-10-29 PROCEDURE — 80048 BASIC METABOLIC PNL TOTAL CA: CPT | Performed by: PHYSICIAN ASSISTANT

## 2017-10-29 PROCEDURE — 97530 THERAPEUTIC ACTIVITIES: CPT | Mod: GP

## 2017-10-29 PROCEDURE — 40000193 ZZH STATISTIC PT WARD VISIT

## 2017-10-29 PROCEDURE — 25000132 ZZH RX MED GY IP 250 OP 250 PS 637: Performed by: STUDENT IN AN ORGANIZED HEALTH CARE EDUCATION/TRAINING PROGRAM

## 2017-10-29 PROCEDURE — 25000128 H RX IP 250 OP 636: Performed by: PHYSICIAN ASSISTANT

## 2017-10-29 PROCEDURE — 85025 COMPLETE CBC W/AUTO DIFF WBC: CPT | Performed by: PHYSICIAN ASSISTANT

## 2017-10-29 PROCEDURE — 84100 ASSAY OF PHOSPHORUS: CPT | Performed by: PHYSICIAN ASSISTANT

## 2017-10-29 PROCEDURE — 12000008 ZZH R&B INTERMEDIATE UMMC

## 2017-10-29 PROCEDURE — 25000128 H RX IP 250 OP 636: Performed by: INTERNAL MEDICINE

## 2017-10-29 PROCEDURE — 85610 PROTHROMBIN TIME: CPT | Performed by: PHYSICIAN ASSISTANT

## 2017-10-29 PROCEDURE — 99232 SBSQ HOSP IP/OBS MODERATE 35: CPT | Performed by: INTERNAL MEDICINE

## 2017-10-29 PROCEDURE — 25000132 ZZH RX MED GY IP 250 OP 250 PS 637: Performed by: PHYSICIAN ASSISTANT

## 2017-10-29 PROCEDURE — 25000132 ZZH RX MED GY IP 250 OP 250 PS 637: Performed by: INTERNAL MEDICINE

## 2017-10-29 PROCEDURE — 97110 THERAPEUTIC EXERCISES: CPT | Mod: GP

## 2017-10-29 PROCEDURE — S0169 CALCITROL: HCPCS | Performed by: PHYSICIAN ASSISTANT

## 2017-10-29 PROCEDURE — 25000125 ZZHC RX 250: Performed by: PHYSICIAN ASSISTANT

## 2017-10-29 PROCEDURE — S0169 CALCITROL: HCPCS | Performed by: INTERNAL MEDICINE

## 2017-10-29 PROCEDURE — 84550 ASSAY OF BLOOD/URIC ACID: CPT | Performed by: PHYSICIAN ASSISTANT

## 2017-10-29 PROCEDURE — 25000131 ZZH RX MED GY IP 250 OP 636 PS 637: Performed by: PHYSICIAN ASSISTANT

## 2017-10-29 PROCEDURE — 83615 LACTATE (LD) (LDH) ENZYME: CPT | Performed by: PHYSICIAN ASSISTANT

## 2017-10-29 PROCEDURE — 36592 COLLECT BLOOD FROM PICC: CPT | Performed by: PHYSICIAN ASSISTANT

## 2017-10-29 RX ORDER — AMOXICILLIN 250 MG
2 CAPSULE ORAL 2 TIMES DAILY
Status: DISCONTINUED | OUTPATIENT
Start: 2017-10-29 | End: 2017-10-31 | Stop reason: HOSPADM

## 2017-10-29 RX ORDER — FUROSEMIDE 10 MG/ML
20 INJECTION INTRAMUSCULAR; INTRAVENOUS ONCE
Status: COMPLETED | OUTPATIENT
Start: 2017-10-29 | End: 2017-10-29

## 2017-10-29 RX ORDER — DIAZEPAM 5 MG
5 TABLET ORAL 3 TIMES DAILY
Status: DISCONTINUED | OUTPATIENT
Start: 2017-10-29 | End: 2017-10-31 | Stop reason: HOSPADM

## 2017-10-29 RX ORDER — BISACODYL 5 MG
15 TABLET, DELAYED RELEASE (ENTERIC COATED) ORAL DAILY PRN
Status: DISCONTINUED | OUTPATIENT
Start: 2017-10-29 | End: 2017-10-31 | Stop reason: HOSPADM

## 2017-10-29 RX ORDER — FUROSEMIDE 40 MG
40 TABLET ORAL 2 TIMES DAILY
Status: DISCONTINUED | OUTPATIENT
Start: 2017-10-30 | End: 2017-10-31 | Stop reason: HOSPADM

## 2017-10-29 RX ADMIN — FERROUS SULFATE TAB 325 MG (65 MG ELEMENTAL FE) 325 MG: 325 (65 FE) TAB at 20:03

## 2017-10-29 RX ADMIN — DOXORUBICIN HYDROCHLORIDE 0.8 MG: 2 INJECTION, SOLUTION INTRAVENOUS at 20:17

## 2017-10-29 RX ADMIN — MONTELUKAST SODIUM 20 MG: 10 TABLET, FILM COATED ORAL at 20:03

## 2017-10-29 RX ADMIN — MORPHINE SULFATE 30 MG: 30 TABLET, EXTENDED RELEASE ORAL at 12:24

## 2017-10-29 RX ADMIN — CALCITRIOL 0.5 MCG: 0.5 CAPSULE, LIQUID FILLED ORAL at 12:24

## 2017-10-29 RX ADMIN — CELECOXIB 200 MG: 200 CAPSULE ORAL at 20:04

## 2017-10-29 RX ADMIN — DOCUSATE SODIUM 100 MG: 100 CAPSULE, LIQUID FILLED ORAL at 20:03

## 2017-10-29 RX ADMIN — METHOCARBAMOL 750 MG: 750 TABLET ORAL at 12:24

## 2017-10-29 RX ADMIN — DIAZEPAM 5 MG: 5 TABLET ORAL at 14:02

## 2017-10-29 RX ADMIN — CROMOLYN SODIUM 1 DROP: 40 SOLUTION/ DROPS OPHTHALMIC at 12:24

## 2017-10-29 RX ADMIN — MORPHINE SULFATE 60 MG: 60 TABLET, EXTENDED RELEASE ORAL at 19:32

## 2017-10-29 RX ADMIN — RANITIDINE 75 MG: 75 TABLET, FILM COATED ORAL at 12:24

## 2017-10-29 RX ADMIN — CALCITRIOL 0.5 MCG: 0.5 CAPSULE, LIQUID FILLED ORAL at 05:50

## 2017-10-29 RX ADMIN — DIAZEPAM 5 MG: 5 TABLET ORAL at 19:33

## 2017-10-29 RX ADMIN — CROMOLYN SODIUM 1 DROP: 40 SOLUTION/ DROPS OPHTHALMIC at 19:51

## 2017-10-29 RX ADMIN — METHOCARBAMOL 750 MG: 750 TABLET ORAL at 05:50

## 2017-10-29 RX ADMIN — LEVOTHYROXINE SODIUM 224 MCG: 112 TABLET ORAL at 05:50

## 2017-10-29 RX ADMIN — CROMOLYN SODIUM 1 DROP: 40 SOLUTION/ DROPS OPHTHALMIC at 05:51

## 2017-10-29 RX ADMIN — ACYCLOVIR 400 MG: 400 TABLET ORAL at 05:50

## 2017-10-29 RX ADMIN — FLUCONAZOLE 200 MG: 200 TABLET ORAL at 20:03

## 2017-10-29 RX ADMIN — SENNOSIDES AND DOCUSATE SODIUM 2 TABLET: 8.6; 5 TABLET ORAL at 20:04

## 2017-10-29 RX ADMIN — PREDNISONE 60 MG: 50 TABLET ORAL at 07:42

## 2017-10-29 RX ADMIN — ETOPOSIDE 100 MG: 20 INJECTION, SOLUTION, CONCENTRATE INTRAVENOUS at 20:16

## 2017-10-29 RX ADMIN — POLYETHYLENE GLYCOL 3350 17 G: 17 POWDER, FOR SOLUTION ORAL at 22:42

## 2017-10-29 RX ADMIN — SENNOSIDES AND DOCUSATE SODIUM 2 TABLET: 8.6; 5 TABLET ORAL at 12:58

## 2017-10-29 RX ADMIN — PREDNISONE 60 MG: 50 TABLET ORAL at 20:03

## 2017-10-29 RX ADMIN — FUROSEMIDE 20 MG: 10 INJECTION, SOLUTION INTRAVENOUS at 12:27

## 2017-10-29 RX ADMIN — POTASSIUM CHLORIDE 20 MEQ: 750 TABLET, EXTENDED RELEASE ORAL at 12:23

## 2017-10-29 RX ADMIN — FERROUS SULFATE TAB 325 MG (65 MG ELEMENTAL FE) 325 MG: 325 (65 FE) TAB at 12:24

## 2017-10-29 RX ADMIN — CETIRIZINE HYDROCHLORIDE 10 MG: 10 TABLET, FILM COATED ORAL at 05:50

## 2017-10-29 RX ADMIN — OXYCODONE HYDROCHLORIDE 30 MG: 30 TABLET ORAL at 12:21

## 2017-10-29 RX ADMIN — CELECOXIB 200 MG: 200 CAPSULE ORAL at 07:42

## 2017-10-29 RX ADMIN — CETIRIZINE HYDROCHLORIDE 10 MG: 10 TABLET, FILM COATED ORAL at 12:22

## 2017-10-29 RX ADMIN — OXYCODONE HYDROCHLORIDE 30 MG: 30 TABLET ORAL at 16:37

## 2017-10-29 RX ADMIN — LIDOCAINE: 50 OINTMENT TOPICAL at 07:44

## 2017-10-29 RX ADMIN — BISACODYL 15 MG: 5 TABLET, COATED ORAL at 22:42

## 2017-10-29 RX ADMIN — OXYCODONE HYDROCHLORIDE 30 MG: 30 TABLET ORAL at 03:28

## 2017-10-29 RX ADMIN — OXYCODONE HYDROCHLORIDE 30 MG: 30 TABLET ORAL at 20:14

## 2017-10-29 RX ADMIN — CROMOLYN SODIUM 1 DROP: 40 SOLUTION/ DROPS OPHTHALMIC at 16:36

## 2017-10-29 RX ADMIN — DICLOFENAC SODIUM 2 G: 10 GEL TOPICAL at 07:44

## 2017-10-29 RX ADMIN — LEVOTHYROXINE SODIUM 112 MCG: 112 TABLET ORAL at 05:50

## 2017-10-29 RX ADMIN — CETIRIZINE HYDROCHLORIDE 10 MG: 10 TABLET, FILM COATED ORAL at 20:04

## 2017-10-29 RX ADMIN — MONTELUKAST SODIUM 20 MG: 10 TABLET, FILM COATED ORAL at 05:50

## 2017-10-29 RX ADMIN — METHOCARBAMOL 750 MG: 750 TABLET ORAL at 16:37

## 2017-10-29 RX ADMIN — FUROSEMIDE 20 MG: 20 TABLET ORAL at 07:41

## 2017-10-29 RX ADMIN — ONDANSETRON HYDROCHLORIDE 16 MG: 8 TABLET, FILM COATED ORAL at 20:03

## 2017-10-29 RX ADMIN — METHOCARBAMOL 750 MG: 750 TABLET ORAL at 20:03

## 2017-10-29 RX ADMIN — OXYCODONE HYDROCHLORIDE 30 MG: 30 TABLET ORAL at 07:55

## 2017-10-29 RX ADMIN — RANITIDINE 75 MG: 75 TABLET, FILM COATED ORAL at 05:50

## 2017-10-29 RX ADMIN — ACYCLOVIR 400 MG: 400 TABLET ORAL at 20:03

## 2017-10-29 RX ADMIN — DIAZEPAM 5 MG: 5 TABLET ORAL at 05:50

## 2017-10-29 RX ADMIN — RANITIDINE 75 MG: 75 TABLET, FILM COATED ORAL at 20:03

## 2017-10-29 RX ADMIN — ALLOPURINOL 300 MG: 300 TABLET ORAL at 05:50

## 2017-10-29 RX ADMIN — HYDROCHLOROTHIAZIDE 12.5 MG: 12.5 CAPSULE ORAL at 12:22

## 2017-10-29 RX ADMIN — MORPHINE SULFATE 30 MG: 30 TABLET, EXTENDED RELEASE ORAL at 05:50

## 2017-10-29 ASSESSMENT — PAIN DESCRIPTION - DESCRIPTORS
DESCRIPTORS: ACHING;CONSTANT
DESCRIPTORS: ACHING

## 2017-10-29 NOTE — PLAN OF CARE
Problem: Patient Care Overview  Goal: Plan of Care/Patient Progress Review  AVSS. C/o chest pain; got oxycodone x2; on scheduled MS Contin; partial pain control. Chemotherapy infusing without problems see Chemo note. Good blood return from Port.Up independently. Needs a dose of prednisone added at end of protocol d/t late day one chemo start; please pass on to days. Gave pt new compression stockings to help control LE edema; pt is wearing.

## 2017-10-29 NOTE — PLAN OF CARE
Problem: Chemotherapy Effects (Adult)  Goal: Signs and Symptoms of Listed Potential Problems Will be Absent, Minimized or Managed (Chemotherapy Effects)  Signs and symptoms of listed potential problems will be absent, minimized or managed by discharge/transition of care (reference Chemotherapy Effects (Adult) CPG).   Outcome: No Change  Chemotherapy infusing without problems. Good blood return from Port. Oxycodone for generalized pain with some relief. Denies nausea. Up independently. Had small hard stool so laxative dose adjusted. She has PRN Dulcolax tablets available if needed.

## 2017-10-29 NOTE — PLAN OF CARE
"Problem: Patient Care Overview  Goal: Plan of Care/Patient Progress Review  Outcome: No Change  [id number]: AVSS, afebrile. Reports bilateral shoulder, back and chest pain, Oxycodone PO x2 given, pt reports pain remains the same, able to perform ADLs and sleep in between cares.  L ankle dressing, CDI.  CIVI Vincristine/Dox/Etop infusing, good blood returns. Anxiety, Valium given, pt reports Valium taken TID, currently PRN. Pt reports had some \"best sleep\" tonight. Independent. Voided independently, no BM overnight (no BM since 10/26). Continue with POC.      "

## 2017-10-29 NOTE — PLAN OF CARE
Problem: Patient Care Overview  Goal: Plan of Care/Patient Progress Review  Discharge Planner PT 7D  Patient plan for discharge: home with assist  Current status: pt is independent with mobility. Tolerates LE strengthening program prior to completing 15 minutes treadmill activity to improve endurance. Tolerates up to 2.0 MPH on treadmill with no incline. Endorses 4/10 OMNI scale fatigue  Barriers to return to prior living situation: fatigue  Recommendations for discharge: home with assist, OP cancer rehab  Rationale for recommendations: pt demonstrates increased level of fatigue with session today, will benefit from continued therapy following discharge to progress functional activity tolerance.       Entered by: Avinash Krueger 10/29/2017 9:59 AM

## 2017-10-29 NOTE — PROGRESS NOTES
Nursing Focus: Chemotherapy  D: Positive blood return via Port. Insertion site is clean/dry/intact, dressing intact with no complaints of pain.  Urine output is recorded in intake in Doc Flowsheet.    I: Premedications given per order (see electronic medical administration record).  Dose #2 Etoposide and dose #2 vincristine/doxorubicin started to infuse over 23 hours instead of 24 hours as per note in springboard. Vincristine/doxorubicin rate increased to 48ml/hr as dose 1 took over 24 hours to infuse instead of 23. Reviewed pt teaching on chemotherapy side effects.  Pt denies need for further teaching. Chemotherapy double checked per protocol by two chemotherapy competent RN's.   A: Tolerating procedure well. Denies nausea and or pain.   P: Continue to monitor urine output and symptoms of nausea. Screen for symptoms of toxicity.

## 2017-10-30 ENCOUNTER — CARE COORDINATION (OUTPATIENT)
Dept: ONCOLOGY | Facility: CLINIC | Age: 57
End: 2017-10-30

## 2017-10-30 ENCOUNTER — APPOINTMENT (OUTPATIENT)
Dept: PHYSICAL THERAPY | Facility: CLINIC | Age: 57
End: 2017-10-30
Attending: INTERNAL MEDICINE
Payer: COMMERCIAL

## 2017-10-30 ENCOUNTER — APPOINTMENT (OUTPATIENT)
Dept: GENERAL RADIOLOGY | Facility: CLINIC | Age: 57
End: 2017-10-30
Attending: PHYSICIAN ASSISTANT
Payer: COMMERCIAL

## 2017-10-30 LAB
ANION GAP SERPL CALCULATED.3IONS-SCNC: 8 MMOL/L (ref 3–14)
BASOPHILS # BLD AUTO: 0 10E9/L (ref 0–0.2)
BASOPHILS NFR BLD AUTO: 0.1 %
BUN SERPL-MCNC: 15 MG/DL (ref 7–30)
CALCIUM SERPL-MCNC: 7.2 MG/DL (ref 8.5–10.1)
CHLORIDE SERPL-SCNC: 105 MMOL/L (ref 94–109)
CO2 SERPL-SCNC: 30 MMOL/L (ref 20–32)
CREAT SERPL-MCNC: 0.7 MG/DL (ref 0.52–1.04)
DIFFERENTIAL METHOD BLD: ABNORMAL
EOSINOPHIL # BLD AUTO: 0 10E9/L (ref 0–0.7)
EOSINOPHIL NFR BLD AUTO: 0 %
ERYTHROCYTE [DISTWIDTH] IN BLOOD BY AUTOMATED COUNT: 20.2 % (ref 10–15)
GFR SERPL CREATININE-BSD FRML MDRD: 86 ML/MIN/1.7M2
GLUCOSE CSF-MCNC: 88 MG/DL (ref 40–70)
GLUCOSE SERPL-MCNC: 139 MG/DL (ref 70–99)
GRAM STN SPEC: NORMAL
GRAM STN SPEC: NORMAL
HCT VFR BLD AUTO: 27.2 % (ref 35–47)
HGB BLD-MCNC: 8.1 G/DL (ref 11.7–15.7)
IMM GRANULOCYTES # BLD: 0.1 10E9/L (ref 0–0.4)
IMM GRANULOCYTES NFR BLD: 0.7 %
INR PPP: 0.96 (ref 0.86–1.14)
INTERPRETATION ECG - MUSE: NORMAL
LDH SERPL L TO P-CCNC: 327 U/L (ref 81–234)
LYMPHOCYTES # BLD AUTO: 0.3 10E9/L (ref 0.8–5.3)
LYMPHOCYTES NFR BLD AUTO: 3.3 %
MAGNESIUM SERPL-MCNC: 2.2 MG/DL (ref 1.6–2.3)
MCH RBC QN AUTO: 25.9 PG (ref 26.5–33)
MCHC RBC AUTO-ENTMCNC: 29.8 G/DL (ref 31.5–36.5)
MCV RBC AUTO: 87 FL (ref 78–100)
MONOCYTES # BLD AUTO: 0.4 10E9/L (ref 0–1.3)
MONOCYTES NFR BLD AUTO: 4.8 %
NEUTROPHILS # BLD AUTO: 7.7 10E9/L (ref 1.6–8.3)
NEUTROPHILS NFR BLD AUTO: 91.1 %
NRBC # BLD AUTO: 0 10*3/UL
NRBC BLD AUTO-RTO: 0 /100
PHOSPHATE SERPL-MCNC: 3.4 MG/DL (ref 2.5–4.5)
PLATELET # BLD AUTO: 205 10E9/L (ref 150–450)
POTASSIUM SERPL-SCNC: 3.4 MMOL/L (ref 3.4–5.3)
PROT CSF-MCNC: 45 MG/DL (ref 15–60)
RBC # BLD AUTO: 3.13 10E12/L (ref 3.8–5.2)
SODIUM SERPL-SCNC: 144 MMOL/L (ref 133–144)
SPECIMEN SOURCE: NORMAL
URATE SERPL-MCNC: 5 MG/DL (ref 2.6–6)
WBC # BLD AUTO: 8.5 10E9/L (ref 4–11)

## 2017-10-30 PROCEDURE — 36592 COLLECT BLOOD FROM PICC: CPT | Performed by: PHYSICIAN ASSISTANT

## 2017-10-30 PROCEDURE — 25000125 ZZHC RX 250: Performed by: RADIOLOGY

## 2017-10-30 PROCEDURE — 82945 GLUCOSE OTHER FLUID: CPT | Performed by: PHYSICIAN ASSISTANT

## 2017-10-30 PROCEDURE — 97116 GAIT TRAINING THERAPY: CPT | Mod: GP | Performed by: REHABILITATION PRACTITIONER

## 2017-10-30 PROCEDURE — 83735 ASSAY OF MAGNESIUM: CPT | Performed by: PHYSICIAN ASSISTANT

## 2017-10-30 PROCEDURE — 99233 SBSQ HOSP IP/OBS HIGH 50: CPT | Mod: 25 | Performed by: INTERNAL MEDICINE

## 2017-10-30 PROCEDURE — 25000125 ZZHC RX 250: Performed by: PHYSICIAN ASSISTANT

## 2017-10-30 PROCEDURE — 83615 LACTATE (LD) (LDH) ENZYME: CPT | Performed by: PHYSICIAN ASSISTANT

## 2017-10-30 PROCEDURE — S0169 CALCITROL: HCPCS | Performed by: INTERNAL MEDICINE

## 2017-10-30 PROCEDURE — 25000132 ZZH RX MED GY IP 250 OP 250 PS 637: Performed by: INTERNAL MEDICINE

## 2017-10-30 PROCEDURE — 40000193 ZZH STATISTIC PT WARD VISIT: Performed by: REHABILITATION PRACTITIONER

## 2017-10-30 PROCEDURE — 96450 CHEMOTHERAPY INTO CNS: CPT

## 2017-10-30 PROCEDURE — 40001004 ZZHCL STATISTIC FLOW INT 9-15 ABY TC 88188: Performed by: PHYSICIAN ASSISTANT

## 2017-10-30 PROCEDURE — 25000132 ZZH RX MED GY IP 250 OP 250 PS 637: Performed by: PHYSICIAN ASSISTANT

## 2017-10-30 PROCEDURE — 25000128 H RX IP 250 OP 636: Performed by: PHYSICIAN ASSISTANT

## 2017-10-30 PROCEDURE — 80048 BASIC METABOLIC PNL TOTAL CA: CPT | Performed by: PHYSICIAN ASSISTANT

## 2017-10-30 PROCEDURE — 85025 COMPLETE CBC W/AUTO DIFF WBC: CPT | Performed by: PHYSICIAN ASSISTANT

## 2017-10-30 PROCEDURE — 84157 ASSAY OF PROTEIN OTHER: CPT | Performed by: PHYSICIAN ASSISTANT

## 2017-10-30 PROCEDURE — 85610 PROTHROMBIN TIME: CPT | Performed by: PHYSICIAN ASSISTANT

## 2017-10-30 PROCEDURE — 84550 ASSAY OF BLOOD/URIC ACID: CPT | Performed by: PHYSICIAN ASSISTANT

## 2017-10-30 PROCEDURE — 87070 CULTURE OTHR SPECIMN AEROBIC: CPT | Performed by: PHYSICIAN ASSISTANT

## 2017-10-30 PROCEDURE — 89050 BODY FLUID CELL COUNT: CPT | Performed by: PHYSICIAN ASSISTANT

## 2017-10-30 PROCEDURE — 25000131 ZZH RX MED GY IP 250 OP 636 PS 637: Performed by: PHYSICIAN ASSISTANT

## 2017-10-30 PROCEDURE — 87205 SMEAR GRAM STAIN: CPT | Performed by: PHYSICIAN ASSISTANT

## 2017-10-30 PROCEDURE — 88184 FLOWCYTOMETRY/ TC 1 MARKER: CPT | Performed by: PHYSICIAN ASSISTANT

## 2017-10-30 PROCEDURE — S0169 CALCITROL: HCPCS | Performed by: PHYSICIAN ASSISTANT

## 2017-10-30 PROCEDURE — 88185 FLOWCYTOMETRY/TC ADD-ON: CPT | Performed by: PHYSICIAN ASSISTANT

## 2017-10-30 PROCEDURE — 84100 ASSAY OF PHOSPHORUS: CPT | Performed by: PHYSICIAN ASSISTANT

## 2017-10-30 PROCEDURE — 12000008 ZZH R&B INTERMEDIATE UMMC

## 2017-10-30 PROCEDURE — 97110 THERAPEUTIC EXERCISES: CPT | Mod: GP | Performed by: REHABILITATION PRACTITIONER

## 2017-10-30 RX ORDER — LIDOCAINE HYDROCHLORIDE 10 MG/ML
5 INJECTION, SOLUTION EPIDURAL; INFILTRATION; INTRACAUDAL; PERINEURAL ONCE
Status: COMPLETED | OUTPATIENT
Start: 2017-10-30 | End: 2017-10-30

## 2017-10-30 RX ADMIN — CROMOLYN SODIUM 1 DROP: 40 SOLUTION/ DROPS OPHTHALMIC at 12:42

## 2017-10-30 RX ADMIN — FERROUS SULFATE TAB 325 MG (65 MG ELEMENTAL FE) 325 MG: 325 (65 FE) TAB at 12:40

## 2017-10-30 RX ADMIN — DIAZEPAM 5 MG: 5 TABLET ORAL at 08:28

## 2017-10-30 RX ADMIN — LACTULOSE 20 G: 20 POWDER, FOR SOLUTION ORAL at 12:26

## 2017-10-30 RX ADMIN — OXYCODONE HYDROCHLORIDE 30 MG: 30 TABLET ORAL at 16:20

## 2017-10-30 RX ADMIN — CROMOLYN SODIUM 1 DROP: 40 SOLUTION/ DROPS OPHTHALMIC at 05:40

## 2017-10-30 RX ADMIN — SENNOSIDES AND DOCUSATE SODIUM 2 TABLET: 8.6; 5 TABLET ORAL at 12:39

## 2017-10-30 RX ADMIN — CETIRIZINE HYDROCHLORIDE 10 MG: 10 TABLET, FILM COATED ORAL at 12:28

## 2017-10-30 RX ADMIN — CELECOXIB 200 MG: 200 CAPSULE ORAL at 19:54

## 2017-10-30 RX ADMIN — METHOCARBAMOL 750 MG: 750 TABLET ORAL at 16:12

## 2017-10-30 RX ADMIN — ETOPOSIDE 100 MG: 20 INJECTION, SOLUTION, CONCENTRATE INTRAVENOUS at 18:30

## 2017-10-30 RX ADMIN — FERROUS SULFATE TAB 325 MG (65 MG ELEMENTAL FE) 325 MG: 325 (65 FE) TAB at 19:54

## 2017-10-30 RX ADMIN — CALCITRIOL 0.5 MCG: 0.5 CAPSULE, LIQUID FILLED ORAL at 12:27

## 2017-10-30 RX ADMIN — RANITIDINE 75 MG: 75 TABLET, FILM COATED ORAL at 12:39

## 2017-10-30 RX ADMIN — DOXORUBICIN HYDROCHLORIDE 0.8 MG: 2 INJECTION, SOLUTION INTRAVENOUS at 18:31

## 2017-10-30 RX ADMIN — METHOCARBAMOL 750 MG: 750 TABLET ORAL at 12:28

## 2017-10-30 RX ADMIN — CETIRIZINE HYDROCHLORIDE 10 MG: 10 TABLET, FILM COATED ORAL at 19:54

## 2017-10-30 RX ADMIN — FLUCONAZOLE 200 MG: 200 TABLET ORAL at 19:54

## 2017-10-30 RX ADMIN — OXYCODONE HYDROCHLORIDE 30 MG: 30 TABLET ORAL at 08:28

## 2017-10-30 RX ADMIN — CELECOXIB 200 MG: 200 CAPSULE ORAL at 12:40

## 2017-10-30 RX ADMIN — ACYCLOVIR 400 MG: 400 TABLET ORAL at 19:54

## 2017-10-30 RX ADMIN — METHOTREXATE: 25 INJECTION, SOLUTION INTRA-ARTERIAL; INTRAMUSCULAR; INTRATHECAL; INTRAVENOUS at 10:15

## 2017-10-30 RX ADMIN — MORPHINE SULFATE 60 MG: 60 TABLET, EXTENDED RELEASE ORAL at 19:54

## 2017-10-30 RX ADMIN — RANITIDINE 75 MG: 75 TABLET, FILM COATED ORAL at 05:39

## 2017-10-30 RX ADMIN — ACYCLOVIR 400 MG: 400 TABLET ORAL at 05:40

## 2017-10-30 RX ADMIN — ALLOPURINOL 300 MG: 300 TABLET ORAL at 05:39

## 2017-10-30 RX ADMIN — OXYCODONE HYDROCHLORIDE 30 MG: 30 TABLET ORAL at 04:29

## 2017-10-30 RX ADMIN — FUROSEMIDE 40 MG: 40 TABLET ORAL at 14:48

## 2017-10-30 RX ADMIN — MONTELUKAST SODIUM 20 MG: 10 TABLET, FILM COATED ORAL at 05:39

## 2017-10-30 RX ADMIN — DIAZEPAM 5 MG: 5 TABLET ORAL at 14:48

## 2017-10-30 RX ADMIN — CROMOLYN SODIUM 1 DROP: 40 SOLUTION/ DROPS OPHTHALMIC at 19:55

## 2017-10-30 RX ADMIN — PREDNISONE 60 MG: 50 TABLET ORAL at 19:54

## 2017-10-30 RX ADMIN — LEVOTHYROXINE SODIUM 224 MCG: 112 TABLET ORAL at 05:39

## 2017-10-30 RX ADMIN — MORPHINE SULFATE 30 MG: 30 TABLET, EXTENDED RELEASE ORAL at 12:39

## 2017-10-30 RX ADMIN — LIDOCAINE HYDROCHLORIDE 100 MG: 10 INJECTION, SOLUTION EPIDURAL; INFILTRATION; INTRACAUDAL; PERINEURAL at 09:28

## 2017-10-30 RX ADMIN — METHOCARBAMOL 750 MG: 750 TABLET ORAL at 05:39

## 2017-10-30 RX ADMIN — ENOXAPARIN SODIUM 40 MG: 40 INJECTION SUBCUTANEOUS at 19:55

## 2017-10-30 RX ADMIN — HYDROCHLOROTHIAZIDE 12.5 MG: 12.5 CAPSULE ORAL at 12:28

## 2017-10-30 RX ADMIN — DIAZEPAM 5 MG: 5 TABLET ORAL at 19:54

## 2017-10-30 RX ADMIN — PREDNISONE 60 MG: 50 TABLET ORAL at 08:28

## 2017-10-30 RX ADMIN — CALCITRIOL 0.5 MCG: 0.5 CAPSULE, LIQUID FILLED ORAL at 05:39

## 2017-10-30 RX ADMIN — OXYCODONE HYDROCHLORIDE 30 MG: 30 TABLET ORAL at 20:04

## 2017-10-30 RX ADMIN — RANITIDINE 75 MG: 75 TABLET, FILM COATED ORAL at 19:54

## 2017-10-30 RX ADMIN — CETIRIZINE HYDROCHLORIDE 10 MG: 10 TABLET, FILM COATED ORAL at 05:39

## 2017-10-30 RX ADMIN — OXYCODONE HYDROCHLORIDE 30 MG: 30 TABLET ORAL at 12:26

## 2017-10-30 RX ADMIN — FUROSEMIDE 40 MG: 40 TABLET ORAL at 12:27

## 2017-10-30 RX ADMIN — ONDANSETRON HYDROCHLORIDE 16 MG: 8 TABLET, FILM COATED ORAL at 19:54

## 2017-10-30 RX ADMIN — CROMOLYN SODIUM 1 DROP: 40 SOLUTION/ DROPS OPHTHALMIC at 16:12

## 2017-10-30 RX ADMIN — MONTELUKAST SODIUM 20 MG: 10 TABLET, FILM COATED ORAL at 19:54

## 2017-10-30 RX ADMIN — OXYCODONE HYDROCHLORIDE 30 MG: 30 TABLET ORAL at 00:33

## 2017-10-30 RX ADMIN — METHOCARBAMOL 750 MG: 750 TABLET ORAL at 19:54

## 2017-10-30 RX ADMIN — POTASSIUM CHLORIDE 20 MEQ: 750 TABLET, EXTENDED RELEASE ORAL at 12:28

## 2017-10-30 RX ADMIN — MORPHINE SULFATE 30 MG: 30 TABLET, EXTENDED RELEASE ORAL at 05:39

## 2017-10-30 ASSESSMENT — PAIN DESCRIPTION - DESCRIPTORS
DESCRIPTORS: ACHING

## 2017-10-30 NOTE — PROGRESS NOTES
Nursing Focus: Chemotherapy  D: Positive blood return via Port. Insertion site is clean/dry/intact, dressing changed with no complaints of pain.  Urine output is recorded in intake in Doc Flowsheet.    I: Premedications given per order (see electronic medical administration record).  Dose #3 Etoposide and dose #3 vincristine/doxorubicin started to infuse over 23 hours instead of 24 hours as per note in springboard. Vincristine/doxorubicin rate at 48 ml/hr and etoposide at 25 ml/hr to try and catch up . Reviewed pt teaching on chemotherapy side effects.  Pt denies need for further teaching. Chemotherapy double checked per protocol by two chemotherapy competent RN's.   A: Tolerating procedure well. Denies nausea and or pain.   P: Continue to monitor urine output and symptoms of nausea. Screen for symptoms of toxicity.

## 2017-10-30 NOTE — PLAN OF CARE
"Problem: Patient Care Overview  Goal: Plan of Care/Patient Progress Review  Outcome: No Change  AVSS, afebrile. Reports ongoing back and chest pain, Oxy PO x2 given, pt requiring pain meds q4hr; MS Contin given. IT chemo scheduled for 0830. Pt requesting pain meds before IT chemo. CIVI chemo infusing, good blood return. Voided appropriately, pt reports no BM tonight. Reports frustration with \"how slow\" chemo is infusing, educated patient that chemo has been increased and will finish early per pharm recommendations and priority is safety. Independent. Continue with POC.       "

## 2017-10-30 NOTE — PLAN OF CARE
Problem: Patient Care Overview  Goal: Plan of Care/Patient Progress Review  Outcome: No Change  Pt was very upset about job situation.IT chemo done this am without problem.c/o constipation. Lactulose given. day3 chemo is civi without problem. Good blood returned via port a cath. Good po intake without n/v.fair pain control with current pain meds.afeb.vss. Up ad inocencio.

## 2017-10-30 NOTE — PROGRESS NOTES
"RN Care Coordination Note  Reason for Outgoing Call:   Received request from inpt attending to contact pt re: care coordination concerns  Nursing Action/Patient Instruction:  Phone call to pt who verbalized the following concerns. I read them back to her at the conclusion of our call to summarize and she verbalized that this is accurate:  1. She wants to be seen for each cycle on a Thursday afternoon, with admission afterwards like 2 or 3pm so that chemotherapy can start on Thursdays instead of Fridays due to delays in start of chemo after Friday am arrival with last 2 cycles. She wants this in order to help her keep her job.   2. She wants a copy of her treatment plan (\"the one you guys look at\") and states that she was already given one but that it was blank.   3. She wants the lab at [facility] to draw her labs at her appointment time, not 30 minutes late as it has been done repeatedly.  4. She wants a copy of each provider clinic visit.    I informed Elvin that I would communicate the above requests to Dr. Sauceda and the SANJANA who sees her outpt so that they can plan next admissions accordingly. We will need to change the 11/17 lab/SANJANA/admission to 11/16 when ok'ed by Dr. Sauceda.  I will work with inpt team to get her a printout of her treatment plan from [facility].   I will notify [facility] mgmt re: lab appt concerns, will encourage pt to contact pt relations as well.  I informed Elvin that I will continue to release her provider notes to [facility] when she requests them, and that I will not be able to track each visit and release them unless she requests, and that she will need to ask breast and palliative clinic staff for their notes.    Patient Response/Evaluation:   Pt thanked me and denied further questions or concerns today.    Laila Barrett, RN, BSN, OCN  Care Coordinator  [facility]    "

## 2017-10-30 NOTE — PLAN OF CARE
Problem: Patient Care Overview  Goal: Plan of Care/Patient Progress Review  Outcome: No Change  AVSS, ongoing c/o chest pain, prn oxycodone given x2, on scheduled MS Contin. Constipation continues; got senna-s, colace, senna-docusate and miralax. Dose #3 chemo hung; see chemo note. Port dressing changed. Abdominal massage given to promote bm. IT chemo scheduled for 0830 tomorrow 10/30. Continue POC.

## 2017-10-30 NOTE — PROGRESS NOTES
Intrathecal Chemo Administration Note   Tisha Arias   October 30, 2017    DIAGNOSIS: High grade DLBCL   PROCEDURE: Intrathecal chemo administration   LOCATION: Radiology   INDICATION: CNS prophylaxis  Lumbar puncture performed and CSF samples collected by the General Radiology team under fluoro.   This writer then administered methotrexate 12mg in 6ml preservative free NS without apparent complication. Chemotherapy previously double checked by two RNs and also verified by this writer and Radiology RN.   Complications: None immediately.   Chemo instillation performed by: Annita Chi DNP, APRN, CNP  Hematology/Oncology  Pager: [phone]

## 2017-10-30 NOTE — PROGRESS NOTES
Care Coordinator- Discharge Planning     Admission Date/Time:  10/27/2017  Attending MD:  Dom Christianson MD  Hematologist: Dr. Sauceda/[facility]     Data  Date of initial CC assessment:  10/30/2017  Chart reviewed, discussed with interdisciplinary team.   Patient was admitted for:   1. High grade B-cell lymphoma (H)         Assessment  Concerns with insurance coverage for discharge needs: None.  Current Living Situation: Patient lives with spouse.  Support System: Supportive  Services Involved: Outpatient Infusion Services  Transportation: Family or Friend will provide  Barriers to Discharge: Completion of chemotherapy    Coordination of Care and Referrals: Provided patient/family with options for Outpatient Infusion Services.    Patient will discharge tomorrow pending completion of chemotherapy. Writer met with patient to review outpatient follow up; message sent to schedulers to adjust appointment times per patient's request.     Patient requested copy of chemotherapy treatment plan from outpatient care coordinator; writer provided patient with plan and briefly reviewed.      Plan  Anticipated Discharge Date:  10/31/31/2017  Anticipated Discharge Plan:  Home with clinic follow up    CTS Handoff completed:  NO (will send to DANIEL Sesay, upon discharge)    DANIEL Quinteros  Phone [phone]  Pager [phone]

## 2017-10-30 NOTE — PLAN OF CARE
Problem: Patient Care Overview  Goal: Plan of Care/Patient Progress Review  PT: cx PT session at this time due to pt in shower.

## 2017-10-30 NOTE — PROGRESS NOTES
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 Received cycle 1 of R-DA-EPOCH and here for cycle 2. Developed significant edema with cycle 1 and chronic constipation. Upset about wait for chemo and other coordination issues, but otherwise has been feeling OK and tolerated first cycle OK. Slightly constipated. No other new issues today.     10 pt ROS otherwise negative   Meds reviewed.    VS reviewed. No distress. No OP lesions. Heart regular. Lungs clear. Abd soft, no TTP. Considerable symmetric LE edema. Port clean/dry/intact.  Labs and imaging reviewed.     Labs on admit revealed slightly elevated LDH and uric acid, CT CAP showed positive response to cycle 1 R-DA-EPOCH and no new lesions. Started R-DA-EPOCH cycle 2 10/27 and plan for Neulasta support. IT chemo in radiology at 0830 tomorrow. Allopurinol and monitor for TLS. Monitor lymphedema and watch volume with scheduled Lasix. Increase bowel regimen. Continue acyclovir and fluconazole, start levofloxacin on discharge. No transfusion requirements. Tamoxifen on hold during lymphoma treatment. Enoxaparin for VTE prophylaxis, hold for IT chemo. Pain control for chronic chest wall pain. Again discussed concerns about timing of chemo and coordination of care, have asked primary coordinator and provider to contact patient to discuss tomorrow. Anticipate discharge when chemo complete Tuesday afternoon if no new issues with clinic follow up.    Dom Christianson MD, PhD  [facility] Attending

## 2017-10-31 VITALS
WEIGHT: 187.5 LBS | RESPIRATION RATE: 18 BRPM | SYSTOLIC BLOOD PRESSURE: 129 MMHG | BODY MASS INDEX: 31.24 KG/M2 | HEART RATE: 81 BPM | HEIGHT: 65 IN | OXYGEN SATURATION: 99 % | DIASTOLIC BLOOD PRESSURE: 74 MMHG | TEMPERATURE: 96.9 F

## 2017-10-31 LAB
ANION GAP SERPL CALCULATED.3IONS-SCNC: 6 MMOL/L (ref 3–14)
APPEARANCE CSF: CLEAR
BASOPHILS # BLD AUTO: 0 10E9/L (ref 0–0.2)
BASOPHILS NFR BLD AUTO: 0 %
BUN SERPL-MCNC: 19 MG/DL (ref 7–30)
CALCIUM SERPL-MCNC: 7.5 MG/DL (ref 8.5–10.1)
CHLORIDE SERPL-SCNC: 105 MMOL/L (ref 94–109)
CO2 SERPL-SCNC: 33 MMOL/L (ref 20–32)
COLOR CSF: COLORLESS
COPATH REPORT: NORMAL
CREAT SERPL-MCNC: 0.74 MG/DL (ref 0.52–1.04)
DIFFERENTIAL METHOD BLD: ABNORMAL
EOSINOPHIL # BLD AUTO: 0 10E9/L (ref 0–0.7)
EOSINOPHIL NFR BLD AUTO: 0 %
ERYTHROCYTE [DISTWIDTH] IN BLOOD BY AUTOMATED COUNT: 19.8 % (ref 10–15)
GFR SERPL CREATININE-BSD FRML MDRD: 81 ML/MIN/1.7M2
GLUCOSE SERPL-MCNC: 119 MG/DL (ref 70–99)
HCT VFR BLD AUTO: 27.8 % (ref 35–47)
HGB BLD-MCNC: 8.3 G/DL (ref 11.7–15.7)
IMM GRANULOCYTES # BLD: 0 10E9/L (ref 0–0.4)
IMM GRANULOCYTES NFR BLD: 0.2 %
INR PPP: 1.03 (ref 0.86–1.14)
LDH SERPL L TO P-CCNC: 338 U/L (ref 81–234)
LYMPHOCYTES # BLD AUTO: 0.3 10E9/L (ref 0.8–5.3)
LYMPHOCYTES NFR BLD AUTO: 6.1 %
MCH RBC QN AUTO: 25.4 PG (ref 26.5–33)
MCHC RBC AUTO-ENTMCNC: 29.9 G/DL (ref 31.5–36.5)
MCV RBC AUTO: 85 FL (ref 78–100)
MONOCYTES # BLD AUTO: 0.1 10E9/L (ref 0–1.3)
MONOCYTES NFR BLD AUTO: 1.9 %
NEUTROPHILS # BLD AUTO: 4.8 10E9/L (ref 1.6–8.3)
NEUTROPHILS NFR BLD AUTO: 91.8 %
NRBC # BLD AUTO: 0 10*3/UL
NRBC BLD AUTO-RTO: 0 /100
PHOSPHATE SERPL-MCNC: 4 MG/DL (ref 2.5–4.5)
PLATELET # BLD AUTO: 223 10E9/L (ref 150–450)
POTASSIUM SERPL-SCNC: 3.5 MMOL/L (ref 3.4–5.3)
RBC # BLD AUTO: 3.27 10E12/L (ref 3.8–5.2)
RBC # CSF MANUAL: 0 /UL (ref 0–2)
SODIUM SERPL-SCNC: 144 MMOL/L (ref 133–144)
TUBE # CSF: 4 #
URATE SERPL-MCNC: 5.7 MG/DL (ref 2.6–6)
WBC # BLD AUTO: 5.3 10E9/L (ref 4–11)
WBC # CSF MANUAL: 0 /UL (ref 0–5)

## 2017-10-31 PROCEDURE — 80048 BASIC METABOLIC PNL TOTAL CA: CPT | Performed by: PHYSICIAN ASSISTANT

## 2017-10-31 PROCEDURE — 25000128 H RX IP 250 OP 636: Performed by: INTERNAL MEDICINE

## 2017-10-31 PROCEDURE — 25000125 ZZHC RX 250: Performed by: PHYSICIAN ASSISTANT

## 2017-10-31 PROCEDURE — 83615 LACTATE (LD) (LDH) ENZYME: CPT | Performed by: PHYSICIAN ASSISTANT

## 2017-10-31 PROCEDURE — 25000131 ZZH RX MED GY IP 250 OP 636 PS 637: Performed by: PHYSICIAN ASSISTANT

## 2017-10-31 PROCEDURE — 36592 COLLECT BLOOD FROM PICC: CPT | Performed by: PHYSICIAN ASSISTANT

## 2017-10-31 PROCEDURE — 84550 ASSAY OF BLOOD/URIC ACID: CPT | Performed by: PHYSICIAN ASSISTANT

## 2017-10-31 PROCEDURE — 85610 PROTHROMBIN TIME: CPT | Performed by: PHYSICIAN ASSISTANT

## 2017-10-31 PROCEDURE — 25000132 ZZH RX MED GY IP 250 OP 250 PS 637: Performed by: INTERNAL MEDICINE

## 2017-10-31 PROCEDURE — 25000128 H RX IP 250 OP 636: Performed by: PHYSICIAN ASSISTANT

## 2017-10-31 PROCEDURE — 85025 COMPLETE CBC W/AUTO DIFF WBC: CPT | Performed by: PHYSICIAN ASSISTANT

## 2017-10-31 PROCEDURE — 99239 HOSP IP/OBS DSCHRG MGMT >30: CPT | Performed by: INTERNAL MEDICINE

## 2017-10-31 PROCEDURE — S0169 CALCITROL: HCPCS | Performed by: INTERNAL MEDICINE

## 2017-10-31 PROCEDURE — 84100 ASSAY OF PHOSPHORUS: CPT | Performed by: PHYSICIAN ASSISTANT

## 2017-10-31 PROCEDURE — S0169 CALCITROL: HCPCS | Performed by: PHYSICIAN ASSISTANT

## 2017-10-31 PROCEDURE — 25000132 ZZH RX MED GY IP 250 OP 250 PS 637: Performed by: PHYSICIAN ASSISTANT

## 2017-10-31 RX ORDER — ALLOPURINOL 300 MG/1
300 TABLET ORAL DAILY
Qty: 30 TABLET | Refills: 0 | Status: ON HOLD | OUTPATIENT
Start: 2017-10-31 | End: 2017-11-20

## 2017-10-31 RX ORDER — DEXAMETHASONE 4 MG/1
TABLET ORAL
Qty: 5 TABLET | Refills: 0 | Status: ON HOLD | OUTPATIENT
Start: 2017-10-31 | End: 2017-11-20

## 2017-10-31 RX ORDER — FERROUS SULFATE 325(65) MG
325 TABLET ORAL
Qty: 90 TABLET | Refills: 0 | Status: ON HOLD | OUTPATIENT
Start: 2017-10-31 | End: 2017-12-11

## 2017-10-31 RX ORDER — BISACODYL 5 MG
15 TABLET, DELAYED RELEASE (ENTERIC COATED) ORAL DAILY PRN
Qty: 30 TABLET | Refills: 0 | Status: SHIPPED | OUTPATIENT
Start: 2017-10-31 | End: 2021-08-11

## 2017-10-31 RX ORDER — AMOXICILLIN 250 MG
1-2 CAPSULE ORAL 2 TIMES DAILY PRN
Qty: 60 TABLET | Refills: 0 | Status: SHIPPED | OUTPATIENT
Start: 2017-10-31 | End: 2021-08-11

## 2017-10-31 RX ORDER — ASPIRIN 81 MG
100 TABLET, DELAYED RELEASE (ENTERIC COATED) ORAL DAILY
Qty: 30 TABLET | Refills: 0 | Status: ON HOLD | OUTPATIENT
Start: 2017-10-31 | End: 2017-12-11

## 2017-10-31 RX ORDER — FLUCONAZOLE 200 MG/1
200 TABLET ORAL DAILY
Qty: 30 TABLET | Refills: 0 | Status: ON HOLD | OUTPATIENT
Start: 2017-10-31 | End: 2017-11-20

## 2017-10-31 RX ORDER — ACYCLOVIR 400 MG/1
400 TABLET ORAL 2 TIMES DAILY
Qty: 60 TABLET | Refills: 0 | Status: ON HOLD | OUTPATIENT
Start: 2017-10-31 | End: 2017-11-20

## 2017-10-31 RX ORDER — LEVOFLOXACIN 250 MG/1
250 TABLET, FILM COATED ORAL DAILY
Qty: 30 TABLET | Refills: 0 | Status: ON HOLD | OUTPATIENT
Start: 2017-10-31 | End: 2017-11-20

## 2017-10-31 RX ORDER — POLYETHYLENE GLYCOL 3350 17 G/17G
1 POWDER, FOR SOLUTION ORAL DAILY
Qty: 510 G | Refills: 0 | Status: SHIPPED | OUTPATIENT
Start: 2017-10-31 | End: 2021-08-11

## 2017-10-31 RX ORDER — FUROSEMIDE 20 MG
40 TABLET ORAL DAILY
Qty: 4 TABLET | Refills: 0 | Status: ON HOLD | OUTPATIENT
Start: 2017-11-01 | End: 2017-11-20

## 2017-10-31 RX ORDER — CYCLOBENZAPRINE HCL 10 MG
10 TABLET ORAL 3 TIMES DAILY PRN
Qty: 90 TABLET | Refills: 0 | Status: ON HOLD | OUTPATIENT
Start: 2017-10-31 | End: 2017-12-11

## 2017-10-31 RX ORDER — CETIRIZINE HYDROCHLORIDE 10 MG/1
10 TABLET ORAL 3 TIMES DAILY
Qty: 30 TABLET | Refills: 0 | Status: ON HOLD | OUTPATIENT
Start: 2017-10-31 | End: 2017-12-11

## 2017-10-31 RX ORDER — HEPARIN SODIUM (PORCINE) LOCK FLUSH IV SOLN 100 UNIT/ML 100 UNIT/ML
5 SOLUTION INTRAVENOUS EVERY 8 HOURS
Status: DISCONTINUED | OUTPATIENT
Start: 2017-10-31 | End: 2017-10-31 | Stop reason: HOSPADM

## 2017-10-31 RX ADMIN — CYCLOPHOSPHAMIDE 1500 MG: 2 INJECTION, POWDER, FOR SOLUTION INTRAVENOUS; ORAL at 16:12

## 2017-10-31 RX ADMIN — SENNOSIDES AND DOCUSATE SODIUM 2 TABLET: 8.6; 5 TABLET ORAL at 08:10

## 2017-10-31 RX ADMIN — RANITIDINE 75 MG: 75 TABLET, FILM COATED ORAL at 11:57

## 2017-10-31 RX ADMIN — METHOCARBAMOL 750 MG: 750 TABLET ORAL at 11:57

## 2017-10-31 RX ADMIN — DIAZEPAM 5 MG: 5 TABLET ORAL at 14:24

## 2017-10-31 RX ADMIN — OXYCODONE HYDROCHLORIDE 30 MG: 30 TABLET ORAL at 16:06

## 2017-10-31 RX ADMIN — MORPHINE SULFATE 30 MG: 30 TABLET, EXTENDED RELEASE ORAL at 11:57

## 2017-10-31 RX ADMIN — CALCITRIOL 0.5 MCG: 0.5 CAPSULE, LIQUID FILLED ORAL at 05:51

## 2017-10-31 RX ADMIN — CETIRIZINE HYDROCHLORIDE 10 MG: 10 TABLET, FILM COATED ORAL at 11:57

## 2017-10-31 RX ADMIN — OXYCODONE HYDROCHLORIDE 30 MG: 30 TABLET ORAL at 05:52

## 2017-10-31 RX ADMIN — PREDNISONE 60 MG: 50 TABLET ORAL at 08:10

## 2017-10-31 RX ADMIN — SODIUM CHLORIDE, PRESERVATIVE FREE 5 ML: 5 INJECTION INTRAVENOUS at 17:47

## 2017-10-31 RX ADMIN — FUROSEMIDE 40 MG: 40 TABLET ORAL at 14:24

## 2017-10-31 RX ADMIN — METHOCARBAMOL 750 MG: 750 TABLET ORAL at 05:51

## 2017-10-31 RX ADMIN — CROMOLYN SODIUM 1 DROP: 40 SOLUTION/ DROPS OPHTHALMIC at 05:58

## 2017-10-31 RX ADMIN — CROMOLYN SODIUM 1 DROP: 40 SOLUTION/ DROPS OPHTHALMIC at 11:56

## 2017-10-31 RX ADMIN — LEVOTHYROXINE SODIUM 224 MCG: 112 TABLET ORAL at 05:50

## 2017-10-31 RX ADMIN — MORPHINE SULFATE 30 MG: 30 TABLET, EXTENDED RELEASE ORAL at 05:51

## 2017-10-31 RX ADMIN — ACYCLOVIR 400 MG: 400 TABLET ORAL at 05:51

## 2017-10-31 RX ADMIN — ALLOPURINOL 300 MG: 300 TABLET ORAL at 05:51

## 2017-10-31 RX ADMIN — MONTELUKAST SODIUM 20 MG: 10 TABLET, FILM COATED ORAL at 05:51

## 2017-10-31 RX ADMIN — CETIRIZINE HYDROCHLORIDE 10 MG: 10 TABLET, FILM COATED ORAL at 05:51

## 2017-10-31 RX ADMIN — FUROSEMIDE 40 MG: 40 TABLET ORAL at 08:09

## 2017-10-31 RX ADMIN — CALCITRIOL 0.5 MCG: 0.5 CAPSULE, LIQUID FILLED ORAL at 12:05

## 2017-10-31 RX ADMIN — OXYCODONE HYDROCHLORIDE 30 MG: 30 TABLET ORAL at 00:14

## 2017-10-31 RX ADMIN — SODIUM CHLORIDE 150 MG: 9 INJECTION, SOLUTION INTRAVENOUS at 15:36

## 2017-10-31 RX ADMIN — CELECOXIB 200 MG: 200 CAPSULE ORAL at 08:10

## 2017-10-31 RX ADMIN — POLYETHYLENE GLYCOL 3350 17 G: 17 POWDER, FOR SOLUTION ORAL at 06:00

## 2017-10-31 RX ADMIN — HYDROCHLOROTHIAZIDE 12.5 MG: 12.5 CAPSULE ORAL at 11:58

## 2017-10-31 RX ADMIN — LACTULOSE 20 G: 20 POWDER, FOR SOLUTION ORAL at 08:10

## 2017-10-31 RX ADMIN — DIAZEPAM 5 MG: 5 TABLET ORAL at 08:10

## 2017-10-31 RX ADMIN — RANITIDINE 75 MG: 75 TABLET, FILM COATED ORAL at 05:50

## 2017-10-31 RX ADMIN — OXYCODONE HYDROCHLORIDE 30 MG: 30 TABLET ORAL at 11:57

## 2017-10-31 RX ADMIN — METHOCARBAMOL 750 MG: 750 TABLET ORAL at 15:36

## 2017-10-31 RX ADMIN — POTASSIUM CHLORIDE 20 MEQ: 750 TABLET, EXTENDED RELEASE ORAL at 11:57

## 2017-10-31 RX ADMIN — FERROUS SULFATE TAB 325 MG (65 MG ELEMENTAL FE) 325 MG: 325 (65 FE) TAB at 11:57

## 2017-10-31 ASSESSMENT — PAIN DESCRIPTION - DESCRIPTORS
DESCRIPTORS: ACHING
DESCRIPTORS: ACHING;CONSTANT
DESCRIPTORS: ACHING
DESCRIPTORS: ACHING
DESCRIPTORS: ACHING;CONSTANT
DESCRIPTORS: ACHING;CONSTANT

## 2017-10-31 NOTE — PROGRESS NOTES
Chemotherapy  D: Blood return is postive per PICC catheter. Chemo double checked by 2 chemo competent RNs.   I: Premedications given (see electronic medical administration record). Day 4 of CIVI vinc/dox and etoposide started to infuse over 24 hours.   A: Tolerating well thus far.   P: Continue to monitor urine output and symptoms of nausea. Screen for symptoms of toxicity.

## 2017-10-31 NOTE — PROGRESS NOTES
Care Coordinator- Discharge Planning     Admission Date/Time:  10/27/2017  Attending MD:  Dom Christianson MD  Hematologist: Dr. Sauceda/[facility]     Data  Date of initial CC assessment:  10/30/2017  Chart reviewed, discussed with interdisciplinary team.   Patient was admitted for:   1. Status post bariatric surgery    2. High grade B-cell lymphoma (H)    3. Myofascial pain    4. Constipation, unspecified constipation type    5. Acute constipation    6. DLBCL (diffuse large B cell lymphoma) (H)    7. Stomatitis and mucositis         Assessment  Concerns with insurance coverage for discharge needs: None.  Current Living Situation: Patient lives with spouse.  Support System: Supportive  Services Involved: Outpatient Infusion Services  Transportation: Family or Friend will provide  Barriers to Discharge: Completion of chemotherapy    Coordination of Care and Referrals: Provided patient/family with options for Outpatient Infusion Services.    10/30  Patient will discharge tomorrow pending completion of chemotherapy. Writer met with patient to review outpatient follow up; message sent to schedulers to adjust appointment times per patient's request.     Patient requested copy of chemotherapy treatment plan from outpatient care coordinator; writer provided patient with plan and briefly reviewed.    10/31 Patient will discharge after completion of chemotherapy. Writer reviewed follow up appointments with patient; patient in agreement with discharge plan.       Plan  Anticipated Discharge Date:  10/31/31/2017  Anticipated Discharge Plan:  Home with clinic follow up    CTS Handoff completed:  Yes (Laila Barrett, DANIEL)    DANIEL Quinteros  Phone [phone]  Pager [phone]

## 2017-10-31 NOTE — DISCHARGE SUMMARY
"[facility], [place]    Discharge Summary  Hematology / Oncology    Date of Admission:  10/27/2017  Date of Discharge:  10/31/2017  Discharging Provider: Tiesha Bernard  Date of Service (when I saw the patient): 10/31/17    Discharge Diagnoses   High grade B-cell lymphoma  Anemia  H/o breast cancer  H/o papillary thyroid cancer  H/o Rachael en Y gastric bypass  Constipation  Chronic chest wall pain  Chronic muscle cramps  Lymphedema  Asthma  Allergies    History of Present Illness   Tisha Arias is a 57 year old female with high grade DLBCL and PMHx significant for breast cancer s/p bilateral mastectomies & BENJIE/BSO, papillary thyroid cancer s/p total thyroidectomy, chronic chest wall pain, muscle spasms, asthma and h/o Rachael en Y gastric bypass, who presents for Cycle 2 of DA-R-EPOCH.    Tolerated chemotherapy well, without significant issues. Patient frustrated with the time lapse between admission and chemotherapy administration. This was discussed with patient's primary hematologist and care coordinator as well as extensively discussed with patient. IT chemotherapy performed 10/30 with CSF collection. Addressed volume status during admission with scheduled Lasix, patient at admission weight at discharge. Addressed complaints of constipation with scheduled +prn bowel regimen. Scheduled close follow up for 11/3 with SANJANA and labs.      Hospital Course   Tisha Arias was admitted on 10/27/2017.  The following problems were addressed during her hospitalization:    HEME  # High grade B cell lymphoma. \"Double hit-like\". Primary is Dr. Sauceda. Patient diagnosed August 2017, when she presented with worsening chest and back pain. Found to have a lytic lesion of right 10th rib with extension. Disease localized above the diaphragm in thoracic and paravertebral soft tissue and mediastinal lymphadenopathy. Biopsy results show non-GCB phenotype with no evidence of c-MYC or BCL6 " rearrangement, but with overexpression of c-MYC by immunohistochemistry and mitotic index over 95%. Staging LP and BMBx were negative for lymphoma. S/p Cycle 1 DA-R-EPOCH c/b mucositis and chronic LE peripheral edema. She presents for admission for Cycle 2 DA-R-EPOCH.  -Port-a-cath in place  -CT C/A/P 10/27/17 (to evaluate disease status w/ abn lab findings) showing significant interval improvement in mediastinal and thoracic masses.   -Notified Dr. Sauceda, care coordinator and patient relations regarding Elvin's frustration with care coordination and wait time until chemo started. Accelerated chemo schedule as able to compensate.       Treatment plan: Cycle 2 DA-R-EPOCH (Day 1-10/27/17).  Today is day 4/5.   -Rituxan 750 mg (Day 0)  -Prednisone 60 mg  (Day 1-5)  -Etoposide 100 mg CIVI (Day 1-4)  -Vincristine 0.8 mg CIVI (Day 1-4)  -Cyclophosphamide 1500 mg (Day 5)  -LP with IT chemotherapy scheduled Monday 10/30 @0830 (recieved her scheduled diazepam and oxycodone prn prior). CSF with no immunophenotypic evidence of B-cell lymphoma.  Premeds: Tylenol and benadryl prior to Rituxan, Zofran (D1-5), Emend (d5), Dex (D6-7).  -Neulasta  (11/2)  -Discharge with dex 8 mg x 2 days then 4 mg x1 day. To be reevaluated by outpatient SANJANA for further dosing.      #Anemia. 2/2 disease and chemotherapy.  -Transfuse for Hgb <8 and plts <10K.      #Stage 1, ER/NY-positive, HER2-negative breast cancer. Follows with Dr. Crawley. Diagnosed in 2011. Oncotype recurrence score of 11 (7% recurrence risk after 5 years of tamoxifen). S/p bilateral mastectomies and BENJIE/BSO in 2012. For endocrine therapy she was on Arimidex from 0671-8842, then changed to Tamoxifen b/c of arthralgias on Arimidex. Has been on Tamoxifen since 2014 now held while being treated for lymphoma. Last dose 10/5.   -F/U with Dr. Crawley 11/27/17.      # Papillary thyroid cancer. Follows with Dr. Castro. Diagnosed in 2013. S/p total thyroidectomy and CND. Received  ETOH treatment August 2014, October 2014 and December 2014. Has post-surgical hypothyroidism.  -Continued PTA Levothyroxine.  -Continued PTA calcitriol.      GI  # H/o Rachael en Y gastric bypass. Likely affecting absorption, thus patient is on multiple supplements.  -Continued Iron, Calcium and Magnesium replacements.     #Constipation, chronic.  -PTA miralax and docusate scheduled.  -Tried PTA meds + dulcolax and senna-S and lactulose with good results.  -Discharged on scheduled miralax daily and docusate. Senna 1-2 tabs bid prn and dulcolax prn available.       Pain  # Chronic chest wall pain after mastectomy.   -Continued MS Contin 30 mg morning and afternoon and 60 mg at night.  -PTA Oxycodone 30 mg q4hrs prn.  -PTA Celebrex 200 BID.   -PTA Lidocaine and Ketoralac creams prn.      #Chronic muscle cramps.   -Continue PTA KCl 20 mEq daily, Robaxin 750 mg QID, Valium 5 mg TID prn, Flexeril prn.      ID  #Anti-infectives.  -Continue PTA acyclovir and fluconazole.  -Will hold Levaquin ppx as patient is not neutropenic. Resumed at discharge.      MISC  #Lymphedema. Of LE. B/l. Wearing compression stockings.   -Continue PTA HCTZ  -Weight mildly up since admission, 85.1-->87.3 kg then down to 85 kg at discharge after 40 mg PO Lasix bid. Continues to have 1-2+ pitting edema bilaterally to knees.  -Discharged with 40 mg daily Lasix x 2 days. Reevaluate at outpatient appointment and further dose as needed.      #Asthma, allergies.   -Continue PTA singular, zyrtec.  -Patient states she is not taking her inhalers. Requests only albuterol prn rescue.      FEN  -IVF per chemo regimen  -PTA electrolyte replacements, can consider sliding scale if necessary.  -RADT      Prophys  -VTE: Lovenox ppx (held for LP with IT chemo)  -PUD: PTA ranitidine  -Bowels: PTA docusate and miralax scheduled, senokot-s scheduled, added dulocolax prn. Lactulose x1.    Tiesha Bernard PA-C  Hematology/Oncology  [phone]    Significant Results and  Procedures   CT CAP- interval improvement of disease (see above)    Pending Results   These results will be followed up by COTY Jasso.  Unresulted Labs Ordered in the Past 30 Days of this Admission     Date and Time Order Name Status Description    10/29/2017 1800 CSF Culture Aerobic Bacterial Preliminary     10/18/2017 1324 Platelets prepare order unit In process     9/27/2017 1016 LEUKEMIA LYMPHOMA EVALUATION (FLOW CYTOMETRY) In process     9/14/2017 1520 Fine needle aspiration In process         Code Status   Full Code    Primary Care Physician   Shahla Crawley    Physical Exam   Temp: 96.4  F (35.8  C) Temp src: Oral BP: 130/73   Heart Rate: 91 Resp: 18 SpO2: 98 % O2 Device: None (Room air)    Vitals:    10/29/17 0735 10/30/17 0700 10/31/17 0600   Weight: 86.4 kg (190 lb 8 oz) 87.3 kg (192 lb 6.4 oz) 85 kg (187 lb 8 oz)     Vital Signs with Ranges  Temp:  [95.4  F (35.2  C)-98.7  F (37.1  C)] 96.4  F (35.8  C)  Heart Rate:  [] 91  Resp:  [16-18] 18  BP: (109-146)/(69-82) 130/73  SpO2:  [95 %-98 %] 98 %  I/O last 3 completed shifts:  In: -   Out: 1050 [Urine:1050]    Constitutional: Alert and interactive female seen ambulating throughout room independantly, in NAD.   HEENT: NCAT, anicteric sclerae, conjunctiva clear. Moist mucous membranes without lesions or thrush.  Respiratory: Non-labored breathing, good air exchange. Lungs are clear to auscultation bilaterally, without wheezing, crackles or rhonchi. No cough noted.   Cardiovascular: Regular rate and rhythm with no murmur, rub or gallop.  GI: Normoactive BS. Abdomen is soft, non-distended, and minimally tender to palpation of LLQ. No rigidity or guarding.  Skin: Warm and dry. No rashes or lesions on exposed surfaces.  Musculoskeletal: Extremities grossly normal. 1-2+ pitting edema of b/l lower extremities. No weeping, erythema, or warmth.  Neurologic: A &O x3, speech normal, answering questions appropriately. Moves all extremities spontaneously.  Grossly non-focal.  Neuropsychiatric: Mentation and affect normal/appropriate.  VAD: Port is c/d/i with no erythema, drainage, or tenderness.    Discharge Disposition   Discharged to home  Condition at discharge: Stable    Consultations This Hospital Stay   PHYSICAL THERAPY ADULT IP CONSULT    Discharge Orders     CBC with platelets differential   Last Lab Result: Hemoglobin (g/dL)      Date                     Value                10/30/2017               8.1 (L)          ----------     Comprehensive metabolic panel     Reason for your hospital stay   Admitted for scheduled chemotherapy.     Follow Up and recommended labs and tests   Follow up as recommended below:     Activity   Your activity upon discharge: activity as tolerated     When to contact your care team   [facility] [place] cancer clinic triage line at [phone] for temp >100.4, uncontrolled nausea/vomiting/diarrhea/constipation, unrelieved pain, bleeding not relieved with pressure, dizziness, chest pain, shortness of breath, loss of consciousness, and any new or concerning symptoms.     Full Code     Diet   Follow this diet upon discharge: Regular       Discharge Medications   Current Discharge Medication List      START taking these medications    Details   bisacodyl (DULCOLAX) 5 MG EC tablet Take 3 tablets (15 mg) by mouth daily as needed for constipation  Qty: 30 tablet, Refills: 0    Associated Diagnoses: Constipation, unspecified constipation type      senna-docusate (SENOKOT-S;PERICOLACE) 8.6-50 MG per tablet Take 1-2 tablets by mouth 2 times daily as needed for constipation  Qty: 60 tablet, Refills: 0    Associated Diagnoses: Acute constipation         CONTINUE these medications which have CHANGED    Details   fluconazole (DIFLUCAN) 200 MG tablet Take 1 tablet (200 mg) by mouth daily  Qty: 30 tablet, Refills: 0    Associated Diagnoses: High grade B-cell lymphoma (H)      cetirizine (ZYRTEC ALLERGY) 10 MG tablet Take 1 tablet (10 mg) by mouth 3 times  daily On hold for lab test.  Qty: 30 tablet, Refills: 0    Associated Diagnoses: High grade B-cell lymphoma (H)      acyclovir (ZOVIRAX) 400 MG tablet Take 1 tablet (400 mg) by mouth 2 times daily  Qty: 60 tablet, Refills: 0    Associated Diagnoses: High grade B-cell lymphoma (H)      dexamethasone (DECADRON) 4 MG tablet Take 8 mg (2 tab) by mouth 11/1 and 11/2, then 4 mg (1 tab) by mouth 11/3.  Qty: 5 tablet, Refills: 0    Associated Diagnoses: High grade B-cell lymphoma (H)      diclofenac (VOLTAREN) 1 % GEL topical gel Apply affected area two times daily PRN using enclosed dosing card.  Qty: 100 g, Refills: 0    Associated Diagnoses: Myofascial pain      furosemide (LASIX) 20 MG tablet Take 2 tablets (40 mg) by mouth daily for 2 days  Qty: 4 tablet, Refills: 0    Associated Diagnoses: High grade B-cell lymphoma (H)      levofloxacin (LEVAQUIN) 250 MG tablet Take 1 tablet (250 mg) by mouth daily  Qty: 30 tablet, Refills: 0    Associated Diagnoses: High grade B-cell lymphoma (H)      allopurinol (ZYLOPRIM) 300 MG tablet Take 1 tablet (300 mg) by mouth daily  Qty: 30 tablet, Refills: 0    Associated Diagnoses: High grade B-cell lymphoma (H)      ferrous sulfate (IRON) 325 (65 FE) MG tablet Take 1 tablet (325 mg) by mouth 3 times daily (with meals)  Qty: 90 tablet, Refills: 0    Associated Diagnoses: Status post bariatric surgery      docusate sodium (COLACE) 100 MG tablet Take 100 mg by mouth daily  Qty: 30 tablet, Refills: 0    Associated Diagnoses: Acute constipation      polyethylene glycol (MIRALAX) powder Take 17 g (1 capful) by mouth daily  Qty: 510 g, Refills: 0    Associated Diagnoses: Acute constipation      magic mouthwash suspension (diphenhydrAMINE, lidocaine, aluminum-magnesium & simethicone) Swish and spit 10 mLs in mouth every 6 hours as needed for mouth sores  Qty: 360 mL, Refills: 1    Associated Diagnoses: Stomatitis and mucositis; High grade B-cell lymphoma (H)      cyclobenzaprine (FLEXERIL) 10  MG tablet Take 1 tablet (10 mg) by mouth 3 times daily as needed  Qty: 90 tablet, Refills: 0    Associated Diagnoses: High grade B-cell lymphoma (H)         CONTINUE these medications which have NOT CHANGED    Details   oxyCODONE (ROXICODONE) 30 MG IR tablet Take 1 tablet (30 mg) by mouth every 4 hours as needed for moderate to severe pain  Qty: 180 tablet, Refills: 0    Associated Diagnoses: High grade B-cell lymphoma (H)      !! morphine (MS CONTIN) 30 MG 12 hr tablet Take 1 tablet (30 mg) by mouth every 8 hours . Take an addition pill at night such that your evening dose is 60mg  Qty: 120 tablet, Refills: 0    Associated Diagnoses: Neoplasm related pain      !! UNABLE TO FIND Take 2 capsules by mouth 3 times daily Muscle Mag. 2 caps contain B1 20mg, B2 20mg, B6 10mg, magesium 20mg, manganese 2mg.      !! Morphine Sulfate (MS CONTIN PO) Take 60 mg by mouth daily Takes at 8pm      SUMAtriptan (IMITREX) 50 MG tablet Take 1 tablet (50 mg) by mouth at onset of headache for migraine (may repeat in 2 hours as needed, max 2 tablets daily)  Qty: 9 tablet, Refills: 3    Associated Diagnoses: Migraine without status migrainosus, not intractable, unspecified migraine type      calcitRIOL (ROCALTROL) 0.25 MCG capsule TAKE 2 CAPSULES BY MOUTH EVERY MORNING AND TAKE 1 CAPSULE BY MOUTH EVERY NIGHT AT BEDTIME  Qty: 270 capsule, Refills: 3    Associated Diagnoses: Postsurgical hypothyroidism; Papillary carcinoma, follicular variant (H); Metastasis to cervical lymph node (H)      celecoxib (CELEBREX) 200 MG capsule TAKE ONE CAPSULE BY MOUTH TWICE DAILY   Qty: 56 capsule, Refills: 0    Associated Diagnoses: Chest wall pain      diphenhydrAMINE (BENADRYL) 50 MG capsule Take 1 capsule (50 mg) by mouth nightly as needed for itching or sleep  Qty: 56 capsule    Associated Diagnoses: High grade B-cell lymphoma (H)      methocarbamol (ROBAXIN) 750 MG tablet Take 1 tablet (750 mg) by mouth 4 times daily as needed . Ok to take a 5th Robaxin  on very severe days.  Qty: 360 tablet, Refills: 1    Associated Diagnoses: Myofascial pain      ondansetron (ZOFRAN-ODT) 8 MG ODT tab Take 1 tablet (8 mg) by mouth every 8 hours as needed for nausea or vomiting  Qty: 90 tablet, Refills: 3    Comments: PA Case #[id number], Approved 9/4/2017-4/2/2018  Associated Diagnoses: Nausea with vomiting      diazepam (VALIUM) 5 MG tablet Take 1 tablet (5 mg) by mouth 3 times daily as needed For muscle spasms  Qty: 90 tablet, Refills: 2    Associated Diagnoses: Chest wall pain      levothyroxine (SYNTHROID/LEVOTHROID) 112 MCG tablet Mon-Sat: (2 tabs daily) Sunday: 3 tabs  Qty: 189 tablet, Refills: 3    Associated Diagnoses: Postsurgical hypothyroidism; Papillary carcinoma, follicular variant (H); Postsurgical hypoparathyroidism (H); Thyroid cancer (H)      hydrochlorothiazide (MICROZIDE) 12.5 MG capsule Take 1 capsule (12.5 mg) by mouth daily  Qty: 90 capsule, Refills: 2    Associated Diagnoses: Hypocalcemia      montelukast (SINGULAIR) 10 MG tablet TAKE TWO TABLETS BY MOUTH TWICE DAILY  Qty: 120 tablet, Refills: 11    Associated Diagnoses: Idiopathic mast cell activation syndrome (H)      potassium chloride SA (POTASSIUM CHLORIDE) 20 MEQ CR tablet Take 1 tablet (20 mEq) by mouth daily  Qty: 90 tablet, Refills: 3    Associated Diagnoses: Hypokalemia      VENTOLIN  (90 BASE) MCG/ACT Inhaler INHALE 2 PUFFS INTO THE LUNGS EVERY 4 HOURS AS NEEDED FOR SHORTNESS OF BREATH OR DIFFICULT BREATHING OR WHEEZING  Qty: 18 g, Refills: 3    Associated Diagnoses: Mild intermittent asthma without complication      cromolyn (OPTICROM) 4 % ophthalmic solution Place 1 drop into both eyes 4 times daily  Qty: 10 mL, Refills: 5    Associated Diagnoses: Idiopathic mast cell activation syndrome (H)      Cholecalciferol (VITAMIN D3) 2000 UNITS CAPS Take 5,000 Units by mouth daily Takes 2 tabs daily  Qty: 60 capsule, Refills: 4    Associated Diagnoses: Thyroid cancer (H); Postsurgical hypothyroidism;  Papillary carcinoma, follicular variant (H); Postsurgical hypoparathyroidism (H)      ranitidine (ZANTAC) 75 MG tablet Take 1 tablet (75 mg) by mouth 3 times daily  Qty: 270 tablet, Refills: 3    Associated Diagnoses: Mast cell disease, systemic      tacrolimus (PROTOPIC) 0.1 % ointment Apply topically 2 times daily  Qty: 60 g, Refills: 3    Associated Diagnoses: Rash; Intertrigo      triamcinolone (KENALOG) 0.025 % ointment Apply topically 2 times daily Mix with 1 lb jar of vaseline or aquaphor  Qty: 80 g, Refills: 3    Associated Diagnoses: Intertrigo; Rash      calcium citrate-vitamin D (CALCIUM CITRATE +D) 315-250 MG-UNIT TABS Take 2 tablets by mouth 3 times daily  Qty: 120 tablet      !! UNABLE TO FIND 3 tablets 3 times daily MEDICATION NAME: calcium D-Glucarate   3 caps contain 180mg of elemental calcium.      Triamcinolone Acetonide (AZMACORT IN) Inhale 2 puffs into the lungs as needed      !! UNABLE TO FIND 2 tablets 3 times daily MEDICATION NAME: Digestzymes     Associated Diagnoses: Thyroid cancer (H); Postsurgical hypothyroidism; Postsurgical hypoparathyroidism (H)      !! UNABLE TO FIND 1 tablet daily MEDICATION NAME: Pure Encapsulations    Associated Diagnoses: Thyroid cancer (H); Postsurgical hypothyroidism; Postsurgical hypoparathyroidism (H)      lidocaine-prilocaine (EMLA) cream Apply topically as needed (port access pain.)  Qty: 30 g, Refills: 1    Associated Diagnoses: Neoplasm related pain; Chest wall pain      !! order for DME Compression stockings, medium grade, please measure and fit. Please dispense a pair.  Qty: 1 Units, Refills: 0    Associated Diagnoses: Peripheral edema      COMPOUND CONTAINING CONTROLLED SUBSTANCE (CMPD RX) - PHARMACY TO MIX COMPOUNDED MEDICATION Apply small amount to affected area two times daily.  Qty: 120 g, Refills: 6    Comments: Ketamine 6% + lidocaine 10% in Lipo.  Associated Diagnoses: Neoplasm related pain      EPINEPHrine (EPIPEN 2-CARIDAD) 0.3 MG/0.3ML injection  Inject 0.3 mLs (0.3 mg) into the muscle once as needed for anaphylaxis  Qty: 0.6 mL, Refills: 3      NASALCROM 5.2 MG/ACT AERS Inhaler SPRAY ONE SPRAY( 1 ML) IN NOSTRIL DAILY  Qty: 1 Bottle, Refills: 6    Associated Diagnoses: Mast cell disease, systemic      lidocaine (XYLOCAINE) 5 % ointment SANJANA TOPICALLY QID PRN  Qty: 35.44 g, Refills: 3    Associated Diagnoses: Chest wall pain      !! order for DME Equipment being ordered: compression stockings. 20-30 mm or 30 - 40 mm as patient can tolerate. Physical therapy to determine if they should be above or below the knee.  Qty: 2 Units, Refills: 4    Associated Diagnoses: Venous stasis      !! order for DME Equipment being ordered: compression bra  Qty: 2 Device, Refills: 1    Associated Diagnoses: Malignant neoplasm of right female breast, unspecified site of breast      aluminum chloride (DRYSOL) 20 % external solution Apply topically At Bedtime  Qty: 60 mL, Refills: 3    Associated Diagnoses: Rash; Intertrigo      !! UNABLE TO FIND MEDICATION NAME: Tumeric 3 capsules daily (on hold during chemo)      mometasone (ASMANEX 30 METERED DOSES) 110 MCG/INH inhaler Inhale 1 puff into the lungs daily  Qty: 3 Inhaler, Refills: 3    Associated Diagnoses: Intermittent asthma, uncomplicated      !! UNABLE TO FIND 2 tablets 3 times daily MEDICATION NAME: Natural D-Hist (on hold during chemo)    Associated Diagnoses: Breast cancer, unspecified laterality; Papillary carcinoma, follicular variant (H); Chronic musculoskeletal pain      calcium carbonate (TUMS) 500 MG chewable tablet Take 2 tablets (1,000 mg) by mouth nightly as needed for other (cramps)  Qty: 180 chew tab, Refills: 3      Omega-3 Fatty Acids (OMEGA 3 PO) Take 5 mLs by mouth      Probiotic Product (PROBIOTIC DAILY PO) Take 1 capsule by mouth daily Lacto acid bifidobacterium    Associated Diagnoses: Breast cancer, unspecified laterality; Thyroid cancer (H); Chronic arthralgias of knees and hips, unspecified laterality        !! - Potential duplicate medications found. Please discuss with provider.      STOP taking these medications       chlorhexidine (PERIDEX) 0.12 % solution Comments:   Reason for Stopping:         tamoxifen (NOLVADEX) 20 MG tablet Comments:   Reason for Stopping:         MAGNESIUM CITRATE PO Comments:   Reason for Stopping:             Allergies   Allergies   Allergen Reactions     Baclofen Other (See Comments) and Unknown     Other reaction(s): Edema, chest pain, seizures.      No Clinical Screening - See Comments Shortness Of Breath, Palpitations, Anaphylaxis, Itching, Swelling, Difficulty breathing and Rash     Sukhjinder wipes- oral allergy -  July 2015: throat tightness from a Chinese herbal medicine Wilmer Barry Tran     Serotonin Anxiety, Other (See Comments) and Swelling     Seizures      Amitriptyline Hcl Swelling     Birch Trees      Potatoes, carrots, cherries, celery, apple, pears, plums, peaches, parsnip, kiwi, hazelnuts, and apricots,      Blue Dyes Itching     Headaches       Cymbalta Other (See Comments)     Flushing, tremor/muscle twitching and edema     Gabapentin Other (See Comments)     edema  Systemic edema, weaned off from Feb to March per Dr. Dowd.    edema     Grass      Mugwort [Artemisia Vulgaris]      Various spices     Ragweeds      Melons, bananas, cucumbers, zucchini.     Topamax      Nortriptyline Itching, Visual Disturbance, Swelling, GI Disturbance, Anxiety, Other (See Comments) and Nausea     Other reaction(s): Swelling     Data   Most Recent 3 CBC's:  Recent Labs   Lab Test  10/31/17   0540  10/30/17   0535  10/29/17   0607   WBC  5.3  8.5  9.5   HGB  8.3*  8.1*  8.2*   MCV  85  87  87   PLT  223  205  201      Most Recent 3 BMP's:  Recent Labs   Lab Test  10/31/17   0540  10/30/17   0535  10/29/17   0607   NA  144  144  137   POTASSIUM  3.5  3.4  3.4   CHLORIDE  105  105  100   CO2  33*  30  29   BUN  19  15  14   CR  0.74  0.70  0.82   ANIONGAP  6  8  8   ELMO  7.5*  7.2*   7.7*   GLC  119*  139*  155*     Most Recent 2 LFT's:  Recent Labs   Lab Test  10/27/17   0659  10/23/17   1335   AST  29  26   ALT  53*  44   ALKPHOS  77  97   BILITOTAL  0.3  0.3     Most Recent INR's and Anticoagulation Dosing History:  Anticoagulation Dose History     Recent Dosing and Labs Latest Ref Rng & Units 9/14/2017 9/27/2017 10/2/2017 10/28/2017 10/29/2017 10/30/2017 10/31/2017    INR 0.86 - 1.14 1.02 1.02 1.14 0.94 1.02 0.96 1.03    INR Point of Care 0.86 - 1.14 - - - - - - -        Most Recent 3 Troponin's:  Recent Labs   Lab Test  10/21/17   1935  10/04/17   1245  09/01/17   1623   05/28/13   1439   TROPI  <0.015  <0.015  <0.015   < >   --    TROPONIN   --    --    --    --   0.13*    < > = values in this interval not displayed.     Most Recent Cholesterol Panel:  Recent Labs   Lab Test  05/21/14   1040   CHOL  168   LDL  81   HDL  62   TRIG  124     Most Recent 6 Bacteria Isolates From Any Culture (See EPIC Reports for Culture Details):  Recent Labs   Lab Test  10/30/17   0940  10/10/17   2247  07/12/16   0712  05/06/13   1038  03/01/12   1303   CULT  Culture negative monitoring continues  No growth  Culture negative after 4 weeks  Moderate growth Normal skin jacky  <10,000 colonies/mL Gram negative rods No further identification 50 to 100,000 colonies/mL Mixed gram positive jacky No Beta hemolytic Streptococcus  No anaerobes isolated  Light growth Coagulase negative Staphylococcus     Most Recent TSH, T4 and A1c Labs:  Recent Labs   Lab Test  07/12/17   0759   01/21/13   0936   TSH  0.02*   < >   --    T4  1.54*   < >   --    A1C   --    --   5.5    < > = values in this interval not displayed.     Results for orders placed or performed during the hospital encounter of 10/27/17   X-ray Lumbar punc for intrathecal chemo admin    Narrative    Lumbar Puncture using Fluoroscopy    History:  LP with IT chemotherapy, DLBCL.    Procedure note: Verbal and written consent for lumbar puncture was  obtained  from the patient, and benefits and risk of the procedure were  explained, including but not limited to worsening headache,  hemorrhage, infection, lower extremity pain, or nerve root injury. The  patient was sterilely prepped and draped with the patient in the prone  position, over the lower back. Under fluoroscopic guidance, the  interlaminar spaces were noted. 1% lidocaine was administered for  local anesthetic over the L2-3 interlaminar space, and a 22 gauge 3.5  inch needle was advanced into the thecal sac under fluoroscopic  guidance.  There was initial show of clear CSF. Approximately 8 cc of  CSF were collected. Following which, intrathecal chemotherapy  administered by the oncology provider.    The needle was removed with the stylet in place. There was no  immediate complication associated with the procedure. Samples were  sent for the requested laboratory testing.      Estimated blood loss: Less than 1 cc.    Fluoroscopic time: 19 seconds      Impression    Impression: Successful lumbar puncture without immediate complication.      I, SEA LACKEY MD, attest that I was present for all critical  portions of the procedure and was immediately available to provide  guidance and assistance during the remainder of the procedure.     I have personally reviewed the examination and initial interpretation  and I agree with the findings.    SEA LACKEY MD   CT Chest/Abdomen/Pelvis w Contrast    Narrative    EXAMINATION: CT CHEST/ABDOMEN/PELVIS W CONTRAST, 10/27/2017 6:36 PM    TECHNIQUE:  Helical CT images from the thoracic inlet through the  symphysis pubis were obtained  with contrast. Contrast dose: iopamidol  ([id number]) solution 115 mL    COMPARISON: PET/CT on 9/6/2017    HISTORY: evaluate for progression of disease    FINDINGS:    Chest: Right IJ Port-A-Cath tip is in the high right atrium. Bilateral  breast implants. Heart size is within normal limits. No pericardial  effusion. No significant hilar or  axillary lymphadenopathy. The  previously noted hypermetabolic soft tissue focus anterolateral to the  descending thoracic aorta noted on 9/6/2017 PET/CT is smaller, now  measuring 1.0 cm short axis, previously 1.9 cm short axis. Soft tissue  mass, previously involving the right 10th rib with extension into the  neural foramen has decreased in size, measuring 2.3 cm, previously 3.4  cm. No evidence of pleural effusion or pneumothorax.    Abdomen and pelvis: Postsurgical changes of cholecystectomy with mild  intra and extrahepatic biliary dilatation. The common bile duct  measures approximately 15 mm in diameter. Otherwise the liver is  unremarkable Postsurgical changes of Rachael-en-Y gastric surgery. The  spleen, pancreas, adrenal glands. Small nonobstructing right kidney  stone. Otherwise both kidneys are unremarkable. No abnormally dilated  bowel loops. No significant free fluid in the abdomen pelvis. No free  peritoneal portal venous gas. Moderate amount of stool in the colon.  No significant retroperitoneal or mesenteric lymphadenopathy. The  previously noted soft tissue focus of increased radiotracer uptake  noted on 9/6/2017 exam is no longer seen.    Bones and soft tissues: Vertebral hemangioma at the level of T9 and  L4, unchanged. Destructive lesion involving the medial right 10th rib  as described above.      Impression    IMPRESSION:   1. Interval response to therapy is indicated by decreased size of  mediastinal lymphadenopathy and right chest wall mass eroding the  right 10th rib.  2. Moderate amount of stool throughout the colon, nonspecific, can be  seen with constipation.  3. Tiny nonobstructing right kidney stone.    I have personally reviewed the examination and initial interpretation  and I agree with the findings.    DAVID AVALOS MD     *Note: Due to a large number of results and/or encounters for the requested time period, some results have not been displayed. A complete set of results  can be found in Results Review.

## 2017-10-31 NOTE — PROGRESS NOTES
A&O. VSS. Oxy x 2 for pain. Vincristine/Doxorubicin & Etoposide infusing, good blood return. Plan for possible discharge today. Will continue w/POC.

## 2017-10-31 NOTE — PLAN OF CARE
Problem: Chemotherapy Effects (Adult)  Goal: Signs and Symptoms of Listed Potential Problems Will be Absent, Minimized or Managed (Chemotherapy Effects)  Signs and symptoms of listed potential problems will be absent, minimized or managed by discharge/transition of care (reference Chemotherapy Effects (Adult) CPG).   Outcome: No Change  Denies nausea. Oxycodone for generalized pain with relief. Chemotherapy infusing without problems. Good blood return from port. Elvin will discharge tonight when chemotherapy is done.

## 2017-11-01 ENCOUNTER — TELEPHONE (OUTPATIENT)
Dept: PALLIATIVE CARE | Facility: CLINIC | Age: 57
End: 2017-11-01

## 2017-11-01 ENCOUNTER — CARE COORDINATION (OUTPATIENT)
Dept: ONCOLOGY | Facility: CLINIC | Age: 57
End: 2017-11-01

## 2017-11-01 DIAGNOSIS — R07.89 CHEST WALL PAIN: ICD-10-CM

## 2017-11-01 NOTE — TELEPHONE ENCOUNTER
Received call from patient wanting to confirm refill dates for her pain medications. Reviewed dates of MS Contin and oxycodone - and confirmed with pharmacy script should be ready for  tomorrow evening when she is here for labs.     She also tells me that while IP she had been using voltaren gel and lidocaine gel in place of the compound cream she has been unable to get. She tells me the voltaren helped a little, but the lidocaine was only 5% and didn't help at all. She asks if MD would be willing to write a script for a higher strength lidocaine gel like she has had in the past. Reviewed voltaren gel was sent to her local pharmacy -- lidocaine gel script can be sent to [facility].

## 2017-11-01 NOTE — PLAN OF CARE
Problem: Patient Care Overview  Goal: Plan of Care/Patient Progress Review  Physical Therapy Discharge Summary     Reason for therapy discharge:    Discharged to home.     Progress towards therapy goal(s). See goals on Care Plan in [facility] electronic health record for goal details.  Goals partially met.  Barriers to achieving goals:   discharge from facility.     Therapy recommendation(s):    Continue home exercise program.

## 2017-11-01 NOTE — PROGRESS NOTES
Patient discharged from the hospital on 10/31. She will be seen in infusion on 11/2 for an injection, no post hospital call needed.    Emma Aquino RN

## 2017-11-01 NOTE — PLAN OF CARE
Problem: Chemotherapy Effects (Adult)  Goal: Signs and Symptoms of Listed Potential Problems Will be Absent, Minimized or Managed (Chemotherapy Effects)  Signs and symptoms of listed potential problems will be absent, minimized or managed by discharge/transition of care (reference Chemotherapy Effects (Adult) CPG).      Chemotherapy  D: Blood return is postive per Port. Chemo double checked by 2 chemo competent RNs.   I: Premedications given (see electronic medical administration record). Day 5 Dose 1 of 1 cytoxan infused over 1 hour.   A: Some fatigue, but tolerated well.  P: Continue to monitor urine output and symptoms of nausea. Screen for symptoms of toxicity.     Discharge  D: Orders for discharge and outpatient medications written, but pt didn't receive hard scripts for oxycodone or morphine & was not happy about that; there was a lot of confusion/frustration for the patient.   I: Home medications and return to clinic schedule reviewed with patient. Discharge instructions and parameters for calling Health Care Provider reviewed. Patient left around 1820 and was meeting family downstairs.   A: Patient/family verbalized understanding and was ready for discharge.   P: Patient instructed to  medications in Pharmacy.     Oxycodone given x1 for generalized pain. Port was heparin locked & de-accessed. Pt took all belongings w/. Pt wasn't happy w/ the discharge and confusion around hard script medications. Discharge paperwork was reviewed. All questions and concerns were addressed.

## 2017-11-02 ENCOUNTER — APPOINTMENT (OUTPATIENT)
Dept: LAB | Facility: CLINIC | Age: 57
End: 2017-11-02
Payer: COMMERCIAL

## 2017-11-02 ENCOUNTER — ALLIED HEALTH/NURSE VISIT (OUTPATIENT)
Dept: ONCOLOGY | Facility: CLINIC | Age: 57
End: 2017-11-02
Payer: COMMERCIAL

## 2017-11-02 VITALS
DIASTOLIC BLOOD PRESSURE: 81 MMHG | OXYGEN SATURATION: 98 % | HEART RATE: 122 BPM | RESPIRATION RATE: 17 BRPM | SYSTOLIC BLOOD PRESSURE: 113 MMHG | TEMPERATURE: 98.4 F

## 2017-11-02 DIAGNOSIS — E89.0 POSTSURGICAL HYPOTHYROIDISM: ICD-10-CM

## 2017-11-02 DIAGNOSIS — C73 THYROID CANCER (H): Primary | ICD-10-CM

## 2017-11-02 DIAGNOSIS — C73 PAPILLARY CARCINOMA, FOLLICULAR VARIANT (H): ICD-10-CM

## 2017-11-02 DIAGNOSIS — E89.2 POSTSURGICAL HYPOPARATHYROIDISM (H): ICD-10-CM

## 2017-11-02 DIAGNOSIS — C85.10 HIGH GRADE B-CELL LYMPHOMA (H): ICD-10-CM

## 2017-11-02 LAB
ALBUMIN SERPL-MCNC: 3.6 G/DL (ref 3.4–5)
ALP SERPL-CCNC: 61 U/L (ref 40–150)
ALT SERPL W P-5'-P-CCNC: 40 U/L (ref 0–50)
ANION GAP SERPL CALCULATED.3IONS-SCNC: 9 MMOL/L (ref 3–14)
AST SERPL W P-5'-P-CCNC: 18 U/L (ref 0–45)
BASOPHILS # BLD AUTO: 0 10E9/L (ref 0–0.2)
BASOPHILS NFR BLD AUTO: 0.3 %
BILIRUB SERPL-MCNC: 0.7 MG/DL (ref 0.2–1.3)
BUN SERPL-MCNC: 23 MG/DL (ref 7–30)
CALCIUM SERPL-MCNC: 8.8 MG/DL (ref 8.5–10.1)
CHLORIDE SERPL-SCNC: 92 MMOL/L (ref 94–109)
CO2 SERPL-SCNC: 36 MMOL/L (ref 20–32)
CREAT SERPL-MCNC: 0.88 MG/DL (ref 0.52–1.04)
DIFFERENTIAL METHOD BLD: ABNORMAL
EOSINOPHIL # BLD AUTO: 0 10E9/L (ref 0–0.7)
EOSINOPHIL NFR BLD AUTO: 0.3 %
ERYTHROCYTE [DISTWIDTH] IN BLOOD BY AUTOMATED COUNT: 19.2 % (ref 10–15)
GFR SERPL CREATININE-BSD FRML MDRD: 66 ML/MIN/1.7M2
GLUCOSE SERPL-MCNC: 109 MG/DL (ref 70–99)
HCT VFR BLD AUTO: 32.4 % (ref 35–47)
HGB BLD-MCNC: 10.1 G/DL (ref 11.7–15.7)
IMM GRANULOCYTES # BLD: 0.1 10E9/L (ref 0–0.4)
IMM GRANULOCYTES NFR BLD: 0.7 %
LYMPHOCYTES # BLD AUTO: 0.6 10E9/L (ref 0.8–5.3)
LYMPHOCYTES NFR BLD AUTO: 8.8 %
MCH RBC QN AUTO: 26.2 PG (ref 26.5–33)
MCHC RBC AUTO-ENTMCNC: 31.2 G/DL (ref 31.5–36.5)
MCV RBC AUTO: 84 FL (ref 78–100)
MONOCYTES # BLD AUTO: 0 10E9/L (ref 0–1.3)
MONOCYTES NFR BLD AUTO: 0.1 %
NEUTROPHILS # BLD AUTO: 6.4 10E9/L (ref 1.6–8.3)
NEUTROPHILS NFR BLD AUTO: 89.8 %
NRBC # BLD AUTO: 0 10*3/UL
NRBC BLD AUTO-RTO: 0 /100
PHOSPHATE SERPL-MCNC: 3.6 MG/DL (ref 2.5–4.5)
PLATELET # BLD AUTO: 302 10E9/L (ref 150–450)
POTASSIUM SERPL-SCNC: 3 MMOL/L (ref 3.4–5.3)
PROT SERPL-MCNC: 6.6 G/DL (ref 6.8–8.8)
RBC # BLD AUTO: 3.86 10E12/L (ref 3.8–5.2)
SODIUM SERPL-SCNC: 137 MMOL/L (ref 133–144)
T4 FREE SERPL-MCNC: 1.3 NG/DL (ref 0.76–1.46)
TSH SERPL DL<=0.005 MIU/L-ACNC: 0.22 MU/L (ref 0.4–4)
WBC # BLD AUTO: 7.2 10E9/L (ref 4–11)

## 2017-11-02 PROCEDURE — 82306 VITAMIN D 25 HYDROXY: CPT | Performed by: PHYSICIAN ASSISTANT

## 2017-11-02 PROCEDURE — 85025 COMPLETE CBC W/AUTO DIFF WBC: CPT | Performed by: PHYSICIAN ASSISTANT

## 2017-11-02 PROCEDURE — 84100 ASSAY OF PHOSPHORUS: CPT | Performed by: PHYSICIAN ASSISTANT

## 2017-11-02 PROCEDURE — 25000128 H RX IP 250 OP 636: Mod: ZF | Performed by: PHYSICIAN ASSISTANT

## 2017-11-02 PROCEDURE — 80053 COMPREHEN METABOLIC PANEL: CPT | Performed by: PHYSICIAN ASSISTANT

## 2017-11-02 PROCEDURE — 84439 ASSAY OF FREE THYROXINE: CPT | Performed by: PHYSICIAN ASSISTANT

## 2017-11-02 PROCEDURE — 86800 THYROGLOBULIN ANTIBODY: CPT | Performed by: PHYSICIAN ASSISTANT

## 2017-11-02 PROCEDURE — 25000128 H RX IP 250 OP 636: Mod: ZF

## 2017-11-02 PROCEDURE — 84443 ASSAY THYROID STIM HORMONE: CPT | Performed by: PHYSICIAN ASSISTANT

## 2017-11-02 PROCEDURE — 84432 ASSAY OF THYROGLOBULIN: CPT | Performed by: PHYSICIAN ASSISTANT

## 2017-11-02 PROCEDURE — 82565 ASSAY OF CREATININE: CPT | Performed by: PHYSICIAN ASSISTANT

## 2017-11-02 PROCEDURE — 00000344 ZZHCL STATISTIC REMEASURE THYROGLOBULIN: Performed by: PHYSICIAN ASSISTANT

## 2017-11-02 RX ORDER — HEPARIN SODIUM (PORCINE) LOCK FLUSH IV SOLN 100 UNIT/ML 100 UNIT/ML
5 SOLUTION INTRAVENOUS
Status: COMPLETED | OUTPATIENT
Start: 2017-11-02 | End: 2017-11-02

## 2017-11-02 RX ADMIN — SODIUM CHLORIDE, PRESERVATIVE FREE 5 ML: 5 INJECTION INTRAVENOUS at 16:38

## 2017-11-02 RX ADMIN — PEGFILGRASTIM 6 MG: 6 INJECTION SUBCUTANEOUS at 17:02

## 2017-11-02 ASSESSMENT — PAIN SCALES - GENERAL: PAINLEVEL: EXTREME PAIN (9)

## 2017-11-02 NOTE — TELEPHONE ENCOUNTER
"Spoke with pharmacist regarding 10% lidocaine. Was informed that product is not commercially available anymore. In order to get this medication it would need to be compounding -- however to compound it would require using the \"base\" that is not covered by insurance. Called and advised patient of this. She tells me she is disappointed but understands, she asks if it can be changed to 5% lidocaine gel and she feels that would work better then nothing. Verbally changed script to 5% with pharmacy.   "

## 2017-11-02 NOTE — MR AVS SNAPSHOT
After Visit Summary   11/2/2017    Tisha Arias    MRN: [id number]           Patient Information     Date Of Birth          1960        Visit Information        Provider Department      11/2/2017 4:30 PM Nurse,  Oncology Injection [facility]        Today's Diagnoses     Thyroid cancer (H)    -  1    Postsurgical hypothyroidism        Papillary carcinoma, follicular variant (H)        Postsurgical hypoparathyroidism        High grade B-cell lymphoma (H)           Follow-ups after your visit        Your next 10 appointments already scheduled     Nov 10, 2017  9:30 AM CST   [facility] Lab Draw with  [facility] LAB DRAW   [facility] ([facility]    [address]  2nd Floor  [facility] [address]   [phone]            Nov 10, 2017 10:00 AM CST   Infusion 360 with UC ONCOLOGY INFUSION, UC 17 ATC   [facility] ([facility])    [address] [place] [address]  2nd Floor  [facility] [address]   [phone]            Nov 10, 2017  4:40 PM CST   (Arrive by 4:25 PM)   Return Visit with Jessica Cox PA-C   [facility] ([facility])    [address] [place] [address]  2nd Floor  [facility] [address]   [phone]            Nov 13, 2017  4:00 PM CST   Masonic Lab Draw with  MASONIC LAB DRAW   [facility]    [address]  2nd Floor  [facility] [address]   [phone]            Nov 13, 2017  4:30 PM CST   (Arrive by 4:15 PM)   RETURN ENDOCRINE with Avelina Castro MD   [facility] ([facility])    [address] [place] [address]  3rd Floor  [facility] [address]   [phone]            Nov 16, 2017  6:30 AM CST   Masonic Lab Draw with  MASONIC LAB DRAW   [facility] ([facility])    [address] [place] [address]  Floor  [facility] [address]   [phone]            Nov 16, 2017 12:30 PM CST   (Arrive by 12:15 PM)   Return Visit with Jessica Cox PA-C   [facility] ([facility])    [address] [place] [address]  2nd [facility] [address]   [phone]            Nov 27, 2017  4:00 PM CST   (Arrive by 3:45 PM)   Return Visit with Shahla Crawley MD   [facility] ([facility])    [address] [place] [address] [phone]              Future tests that were ordered for you today     Open Standing Orders        Priority Remaining Interval Expires Ordered    Red blood cell prepare order unit Routine 99/100 CONDITIONAL (SPECIFY) BLOOD  11/9/2017    Platelets prepare order unit Routine 99/100 CONDITIONAL (SPECIFY) BLOOD  11/9/2017            Who to contact     If you have questions or need follow up information about today's clinic visit or your schedule please contact [facility] directly at [phone].  Normal or non-critical lab and imaging results will be communicated to you by FuelFilmhart, letter or phone within 4 business days after the clinic has received the results. If you do not hear from us within 7 days, please contact the clinic through FIEL[employer] CHIN[employer] or phone. If you have a critical or abnormal lab result, we will notify you by phone as soon as possible.  Submit refill requests through T[employer]ored or call your pharmacy and they will forward the refill request to us. Please allow 3 business days for your refill to be completed.          Additional Information About Your Visit        MyChart Information     Tailored gives you secure access to your electronic health record. If you see a primary care provider, you can also send messages to your care team and make appointments. If you have questions, please call your primary care clinic.  If you do not have a primary care provider,  please call [phone] and they will assist you.        Care EveryWhere ID     This is your Care EveryWhere ID. This could be used by other organizations to access your [facility] medical records  [id number]        Your Vitals Were     Pulse Temperature Respirations Pulse Oximetry          122 98.4  F (36.9  C) (Oral) 17 98%         Blood Pressure from Last 3 Encounters:   No data found for BP    Weight from Last 3 Encounters:   No data found for Wt              Today, you had the following     No orders found for display         Today's Medication Changes          These changes are accurate as of: 11/2/17 11:59 PM.  If you have any questions, ask your nurse or doctor.               These medicines have changed or have updated prescriptions.        Dose/Directions    calcitRIOL 0.25 MCG capsule   Commonly known as:  ROCALTROL   This may have changed:  additional instructions   Used for:  Postsurgical hypothyroidism, Papillary carcinoma, follicular variant (H), Metastasis to cervical lymph node (H)        TAKE 2 CAPSULES BY MOUTH EVERY MORNING AND TAKE 1 CAPSULE BY MOUTH EVERY NIGHT AT BEDTIME   Quantity:  270 capsule   Refills:  3       diazepam 5 MG tablet   Commonly known as:  VALIUM   This may have changed:    - when to take this  - additional instructions   Used for:  Chest wall pain        Dose:  5 mg   Take 1 tablet (5 mg) by mouth 3 times daily as needed For muscle spasms   Quantity:  90 tablet   Refills:  2       levothyroxine 112 MCG tablet   Commonly known as:  SYNTHROID/LEVOTHROID   This may have changed:    - how much to take  - additional instructions   Used for:  Postsurgical hypothyroidism, Papillary carcinoma, follicular variant (H), Postsurgical hypoparathyroidism (H), Thyroid cancer (H)        Mon-Sat: (2 tabs daily) Sunday: 3 tabs   Quantity:  189 tablet   Refills:  3       methocarbamol 750 MG tablet   Commonly known as:  ROBAXIN   This may have changed:    - when to take this  - additional  instructions   Used for:  Myofascial pain        Dose:  750 mg   Take 1 tablet (750 mg) by mouth 4 times daily as needed . Ok to take a 5th Robaxin on very severe days.   Quantity:  360 tablet   Refills:  1       triamcinolone 0.025 % ointment   Commonly known as:  KENALOG   This may have changed:  additional instructions   Used for:  Intertrigo, Rash        Apply topically 2 times daily Mix with 1 lb jar of vaseline or aquaphor   Quantity:  80 g   Refills:  3                Primary Care Provider Office Phone # Fax #    Shahla Raul Crawley [phone]       [address]  [facility] [address]        Equal Access to Services     JENAE ZAMORA : Hadii sacha Venegas, waaxda luqadaha, qaybta kaalmada daina, ranjan martin. So [facility] [phone].    ATENCIÓN: Si habla español, tiene a stiles disposición servicios gratuitos de asistencia lingüística. [facility] [phone].    We comply with applicable federal civil rights laws and [place] laws. We do not discriminate on the basis of race, color, national origin, age, disability, sex, sexual orientation, or gender identity.            Thank you!     Thank you for choosing [facility]  for your care. Our goal is always to provide you with excellent care. Hearing back from our patients is one way we can continue to improve our services. Please take a few minutes to complete the written survey that you may receive in the mail after your visit with us. Thank you!             Your Updated Medication List - Protect others around you: Learn how to safely use, store and throw away your medicines at www.disposemymeds.org.          This list is accurate as of: 11/2/17 11:59 PM.  Always use your most recent med list.                   Brand Name Dispense Instructions for use Diagnosis    acyclovir 400 MG tablet    ZOVIRAX    60 tablet    Take 1 tablet (400 mg) by mouth 2 times daily    High grade B-cell  lymphoma (H)       allopurinol 300 MG tablet    ZYLOPRIM    30 tablet    Take 1 tablet (300 mg) by mouth daily    High grade B-cell lymphoma (H)       aluminum chloride 20 % external solution    DRYSOL    60 mL    Apply topically At Bedtime    Rash, Intertrigo       AZMACORT IN      Inhale 2 puffs into the lungs as needed        bisacodyl 5 MG EC tablet     30 tablet    Take 3 tablets (15 mg) by mouth daily as needed for constipation    Constipation, unspecified constipation type       calcitRIOL 0.25 MCG capsule    ROCALTROL    270 capsule    TAKE 2 CAPSULES BY MOUTH EVERY MORNING AND TAKE 1 CAPSULE BY MOUTH EVERY NIGHT AT BEDTIME    Postsurgical hypothyroidism, Papillary carcinoma, follicular variant (H), Metastasis to cervical lymph node (H)       CALCIUM CITRATE +D 315-250 MG-UNIT Tabs per tablet   Generic drug:  calcium citrate-vitamin D     120 tablet    Take 2 tablets by mouth 3 times daily        celecoxib 200 MG capsule    celeBREX    56 capsule    TAKE ONE CAPSULE BY MOUTH TWICE DAILY    Chest wall pain       cetirizine 10 MG tablet    ZYRTEC ALLERGY    30 tablet    Take 1 tablet (10 mg) by mouth 3 times daily On hold for lab test.    High grade B-cell lymphoma (H)       COMPOUND CONTAINING CONTROLLED SUBSTANCE - PHARMACY TO MIX COMPOUNDED MEDICATION    CMPD RX    120 g    Apply small amount to affected area two times daily.    Neoplasm related pain       cromolyn 4 % ophthalmic solution    OPTICROM    10 mL    Place 1 drop into both eyes 4 times daily    Idiopathic mast cell activation syndrome (H)       cyclobenzaprine 10 MG tablet    FLEXERIL    90 tablet    Take 1 tablet (10 mg) by mouth 3 times daily as needed    High grade B-cell lymphoma (H)       dexamethasone 4 MG tablet    DECADRON    5 tablet    Take 8 mg (2 tab) by mouth 11/1 and 11/2, then 4 mg (1 tab) by mouth 11/3.    High grade B-cell lymphoma (H)       diazepam 5 MG tablet    VALIUM    90 tablet    Take 1 tablet (5 mg) by mouth 3 times  daily as needed For muscle spasms    Chest wall pain       diclofenac 1 % Gel topical gel    VOLTAREN    100 g    Apply affected area two times daily PRN using enclosed dosing card.    Myofascial pain       diphenhydrAMINE 50 MG capsule    BENADRYL    56 capsule    Take 1 capsule (50 mg) by mouth nightly as needed for itching or sleep    High grade B-cell lymphoma (H)       docusate sodium 100 MG tablet    COLACE    30 tablet    Take 100 mg by mouth daily    Acute constipation       ENOVARX-LIDOCAINE HCL 10 % Crea     60 g    Externally apply 1 g topically 3 times daily as needed    Chest wall pain       EPINEPHrine 0.3 MG/0.3ML injection 2-pack    EPIPEN 2-CARIDAD    0.6 mL    Inject 0.3 mLs (0.3 mg) into the muscle once as needed for anaphylaxis        ferrous sulfate 325 (65 FE) MG tablet    IRON    90 tablet    Take 1 tablet (325 mg) by mouth 3 times daily (with meals)    Status post bariatric surgery       fluconazole 200 MG tablet    DIFLUCAN    30 tablet    Take 1 tablet (200 mg) by mouth daily    High grade B-cell lymphoma (H)       furosemide 20 MG tablet    LASIX    4 tablet    Take 2 tablets (40 mg) by mouth daily for 2 days    High grade B-cell lymphoma (H)       hydrochlorothiazide 12.5 MG capsule    MICROZIDE    90 capsule    Take 1 capsule (12.5 mg) by mouth daily    Hypocalcemia       levofloxacin 250 MG tablet    LEVAQUIN    30 tablet    Take 1 tablet (250 mg) by mouth daily    High grade B-cell lymphoma (H)       levothyroxine 112 MCG tablet    SYNTHROID/LEVOTHROID    189 tablet    Mon-Sat: (2 tabs daily) Sunday: 3 tabs    Postsurgical hypothyroidism, Papillary carcinoma, follicular variant (H), Postsurgical hypoparathyroidism (H), Thyroid cancer (H)       lidocaine visc 2% 2.5mL/5mL & maalox/mylanta w/ simeth 2.5mL/5mL & diphenhydrAMINE 5mg/5mL Susp suspension    [facility]    360 mL    Swish and spit 10 mLs in mouth every 6 hours as needed for mouth sores    Stomatitis and mucositis,  High grade B-cell lymphoma (H)       lidocaine-prilocaine cream    EMLA    30 g    Apply topically as needed (port access pain.)    Neoplasm related pain, Chest wall pain       methocarbamol 750 MG tablet    ROBAXIN    360 tablet    Take 1 tablet (750 mg) by mouth 4 times daily as needed . Ok to take a 5th Robaxin on very severe days.    Myofascial pain       mometasone 110 MCG/INH inhaler    ASMANEX 30 METERED DOSES    3 Inhaler    Inhale 1 puff into the lungs daily    Intermittent asthma, uncomplicated       montelukast 10 MG tablet    SINGULAIR    120 tablet    TAKE TWO TABLETS BY MOUTH TWICE DAILY    Idiopathic mast cell activation syndrome (H)       * MS CONTIN PO      Take 60 mg by mouth daily Takes at 8pm        * morphine 30 MG 12 hr tablet    MS CONTIN    120 tablet    Take 1 tablet (30 mg) by mouth every 8 hours . Take an addition pill at night such that your evening dose is 60mg    Neoplasm related pain       NASALCROM 5.2 MG/ACT Aers Inhaler   Generic drug:  cromolyn sodium     1 Bottle    SPRAY ONE SPRAY( 1 ML) IN NOSTRIL DAILY    Mast cell disease, systemic       OMEGA 3 PO      Take 5 mLs by mouth        ondansetron 8 MG ODT tab    ZOFRAN ODT    90 tablet    Take 1 tablet (8 mg) by mouth every 8 hours as needed for nausea or vomiting    Nausea with vomiting       * order for DME     2 Units    Equipment being ordered: compression stockings. 20-30 mm or 30 - 40 mm as patient can tolerate. Physical therapy to determine if they should be above or below the knee.    Venous stasis       * order for DME     2 Device    Equipment being ordered: compression bra    Malignant neoplasm of right female breast, unspecified site of breast       * order for DME     1 Units    Compression stockings, medium grade, please measure and fit. Please dispense a pair.    Peripheral edema       oxyCODONE IR 30 MG tablet    ROXICODONE    180 tablet    Take 1 tablet (30 mg) by mouth every 4 hours as needed for moderate to  severe pain    High grade B-cell lymphoma (H)       polyethylene glycol powder    MIRALAX    510 g    Take 17 g (1 capful) by mouth daily    Acute constipation       potassium chloride SA 20 MEQ CR tablet    KLOR-CON    90 tablet    Take 1 tablet (20 mEq) by mouth daily    Hypokalemia       PROBIOTIC DAILY PO      Take 1 capsule by mouth daily Lacto acid bifidobacterium    Breast cancer, unspecified laterality, Thyroid cancer (H), Chronic arthralgias of knees and hips, unspecified laterality       ranitidine 75 MG tablet    ZANTAC    270 tablet    Take 1 tablet (75 mg) by mouth 3 times daily    Mast cell disease, systemic       senna-docusate 8.6-50 MG per tablet    SENOKOT-S;PERICOLACE    60 tablet    Take 1-2 tablets by mouth 2 times daily as needed for constipation    Acute constipation       SUMAtriptan 50 MG tablet    IMITREX    9 tablet    Take 1 tablet (50 mg) by mouth at onset of headache for migraine (may repeat in 2 hours as needed, max 2 tablets daily)    Migraine without status migrainosus, not intractable, unspecified migraine type       tacrolimus 0.1 % ointment    PROTOPIC    60 g    Apply topically 2 times daily    Rash, Intertrigo       triamcinolone 0.025 % ointment    KENALOG    80 g    Apply topically 2 times daily Mix with 1 lb jar of vaseline or aquaphor    Intertrigo, Rash       TUMS 500 MG chewable tablet   Generic drug:  calcium carbonate     180 chew tab    Take 2 tablets (1,000 mg) by mouth nightly as needed for other (cramps)        * UNABLE TO FIND      3 tablets 3 times daily MEDICATION NAME: calcium D-Glucarate  3 caps contain 180mg of elemental calcium.        * UNABLE TO FIND      2 tablets 3 times daily MEDICATION NAME: Natural D-Hist (on hold during chemo)    Breast cancer, unspecified laterality, Papillary carcinoma, follicular variant (H), Chronic musculoskeletal pain       * UNABLE TO FIND      MEDICATION NAME: Tumeric 3 capsules daily (on hold during chemo)        * UNABLE TO  FIND      Take 2 capsules by mouth 3 times daily Muscle Mag. 2 caps contain B1 20mg, B2 20mg, B6 10mg, magesium 20mg, manganese 2mg.        * UNABLE TO FIND      2 tablets 3 times daily MEDICATION NAME: Digestzymes    Thyroid cancer (H), Postsurgical hypothyroidism, Postsurgical hypoparathyroidism (H)       * UNABLE TO FIND      1 tablet daily MEDICATION NAME: Pure Encapsulations    Thyroid cancer (H), Postsurgical hypothyroidism, Postsurgical hypoparathyroidism (H)       VENTOLIN  (90 BASE) MCG/ACT Inhaler   Generic drug:  albuterol     18 g    INHALE 2 PUFFS INTO THE LUNGS EVERY 4 HOURS AS NEEDED FOR SHORTNESS OF BREATH OR DIFFICULT BREATHING OR WHEEZING    Mild intermittent asthma without complication       vitamin D3 2000 UNITS Caps     60 capsule    Take 5,000 Units by mouth daily Takes 2 tabs daily    Thyroid cancer (H), Postsurgical hypothyroidism, Papillary carcinoma, follicular variant (H), Postsurgical hypoparathyroidism (H)       * Notice:  This list has 11 medication(s) that are the same as other medications prescribed for you. Read the directions carefully, and ask your doctor or other care provider to review them with you.

## 2017-11-02 NOTE — PROGRESS NOTES
Patient arrived to clinic for a Neulasta injection today.  Patient declined to speak with an RN today.  Neulasta injection given to left arm without incident.  Patient will return tomorrow for next appointment.

## 2017-11-03 ENCOUNTER — ONCOLOGY VISIT (OUTPATIENT)
Dept: ONCOLOGY | Facility: CLINIC | Age: 57
End: 2017-11-03
Attending: PHYSICIAN ASSISTANT
Payer: COMMERCIAL

## 2017-11-03 VITALS
RESPIRATION RATE: 16 BRPM | OXYGEN SATURATION: 99 % | WEIGHT: 182 LBS | HEART RATE: 129 BPM | HEIGHT: 65 IN | DIASTOLIC BLOOD PRESSURE: 81 MMHG | SYSTOLIC BLOOD PRESSURE: 124 MMHG | BODY MASS INDEX: 30.32 KG/M2 | TEMPERATURE: 98.1 F

## 2017-11-03 DIAGNOSIS — D05.10 DCIS (DUCTAL CARCINOMA IN SITU) OF BREAST: ICD-10-CM

## 2017-11-03 DIAGNOSIS — R11.0 NAUSEA: ICD-10-CM

## 2017-11-03 DIAGNOSIS — C85.10 HIGH GRADE B-CELL LYMPHOMA (H): Primary | ICD-10-CM

## 2017-11-03 DIAGNOSIS — D05.10 DCIS (DUCTAL CARCINOMA IN SITU) OF BREAST: Primary | ICD-10-CM

## 2017-11-03 LAB
ALBUMIN SERPL-MCNC: 3.3 G/DL (ref 3.4–5)
ALP SERPL-CCNC: 75 U/L (ref 40–150)
ALT SERPL W P-5'-P-CCNC: 37 U/L (ref 0–50)
ANION GAP SERPL CALCULATED.3IONS-SCNC: 8 MMOL/L (ref 3–14)
ANISOCYTOSIS BLD QL SMEAR: ABNORMAL
AST SERPL W P-5'-P-CCNC: 14 U/L (ref 0–45)
BASOPHILS # BLD AUTO: 0 10E9/L (ref 0–0.2)
BASOPHILS NFR BLD AUTO: 0 %
BILIRUB SERPL-MCNC: 0.4 MG/DL (ref 0.2–1.3)
BUN SERPL-MCNC: 26 MG/DL (ref 7–30)
CALCIUM SERPL-MCNC: 8.6 MG/DL (ref 8.5–10.1)
CHLORIDE SERPL-SCNC: 98 MMOL/L (ref 94–109)
CO2 SERPL-SCNC: 33 MMOL/L (ref 20–32)
COPATH REPORT: NORMAL
CREAT SERPL-MCNC: 0.82 MG/DL (ref 0.52–1.04)
DIFFERENTIAL METHOD BLD: ABNORMAL
EOSINOPHIL # BLD AUTO: 0 10E9/L (ref 0–0.7)
EOSINOPHIL NFR BLD AUTO: 0 %
ERYTHROCYTE [DISTWIDTH] IN BLOOD BY AUTOMATED COUNT: 19.6 % (ref 10–15)
GFR SERPL CREATININE-BSD FRML MDRD: 71 ML/MIN/1.7M2
GLUCOSE SERPL-MCNC: 129 MG/DL (ref 70–99)
HCT VFR BLD AUTO: 31.9 % (ref 35–47)
HGB BLD-MCNC: 9.7 G/DL (ref 11.7–15.7)
LYMPHOCYTES # BLD AUTO: 0.5 10E9/L (ref 0.8–5.3)
LYMPHOCYTES NFR BLD AUTO: 2.6 %
MCH RBC QN AUTO: 26.1 PG (ref 26.5–33)
MCHC RBC AUTO-ENTMCNC: 30.4 G/DL (ref 31.5–36.5)
MCV RBC AUTO: 86 FL (ref 78–100)
MONOCYTES # BLD AUTO: 0 10E9/L (ref 0–1.3)
MONOCYTES NFR BLD AUTO: 0 %
NEUTROPHILS # BLD AUTO: 18.5 10E9/L (ref 1.6–8.3)
NEUTROPHILS NFR BLD AUTO: 97.4 %
NEUTS HYPERSEG BLD QL SMEAR: PRESENT
PLATELET # BLD AUTO: 259 10E9/L (ref 150–450)
POTASSIUM SERPL-SCNC: 3.7 MMOL/L (ref 3.4–5.3)
PROT SERPL-MCNC: 6.2 G/DL (ref 6.8–8.8)
RBC # BLD AUTO: 3.72 10E12/L (ref 3.8–5.2)
SODIUM SERPL-SCNC: 139 MMOL/L (ref 133–144)
WBC # BLD AUTO: 19 10E9/L (ref 4–11)

## 2017-11-03 PROCEDURE — 99214 OFFICE O/P EST MOD 30 MIN: CPT | Mod: ZP | Performed by: PHYSICIAN ASSISTANT

## 2017-11-03 PROCEDURE — 86923 COMPATIBILITY TEST ELECTRIC: CPT | Performed by: PHYSICIAN ASSISTANT

## 2017-11-03 PROCEDURE — 80053 COMPREHEN METABOLIC PANEL: CPT | Performed by: PHYSICIAN ASSISTANT

## 2017-11-03 PROCEDURE — 25000128 H RX IP 250 OP 636: Mod: ZF | Performed by: PHYSICIAN ASSISTANT

## 2017-11-03 PROCEDURE — 86850 RBC ANTIBODY SCREEN: CPT | Performed by: PHYSICIAN ASSISTANT

## 2017-11-03 PROCEDURE — 36591 DRAW BLOOD OFF VENOUS DEVICE: CPT

## 2017-11-03 PROCEDURE — 99212 OFFICE O/P EST SF 10 MIN: CPT | Mod: ZF

## 2017-11-03 PROCEDURE — 86901 BLOOD TYPING SEROLOGIC RH(D): CPT | Performed by: PHYSICIAN ASSISTANT

## 2017-11-03 PROCEDURE — 85025 COMPLETE CBC W/AUTO DIFF WBC: CPT | Performed by: PHYSICIAN ASSISTANT

## 2017-11-03 PROCEDURE — 86900 BLOOD TYPING SEROLOGIC ABO: CPT | Performed by: PHYSICIAN ASSISTANT

## 2017-11-03 RX ORDER — HEPARIN SODIUM (PORCINE) LOCK FLUSH IV SOLN 100 UNIT/ML 100 UNIT/ML
5 SOLUTION INTRAVENOUS
Status: COMPLETED | OUTPATIENT
Start: 2017-11-03 | End: 2017-11-03

## 2017-11-03 RX ORDER — PROCHLORPERAZINE MALEATE 10 MG
10 TABLET ORAL EVERY 6 HOURS PRN
Qty: 60 TABLET | Refills: 3 | Status: ON HOLD | OUTPATIENT
Start: 2017-11-03 | End: 2017-12-11

## 2017-11-03 RX ADMIN — SODIUM CHLORIDE, PRESERVATIVE FREE 5 ML: 5 INJECTION INTRAVENOUS at 16:18

## 2017-11-03 ASSESSMENT — PAIN SCALES - GENERAL: PAINLEVEL: EXTREME PAIN (9)

## 2017-11-03 NOTE — NURSING NOTE
"Oncology Rooming Note    November 3, 2017 4:43 PM   Tisha Arias is a 57 year old female who presents for:    Chief Complaint   Patient presents with     Port Draw     port accessed and labs drawn by rn.  vs taken.     Oncology Clinic Visit     Lymphoma, [facility] F/U.     Initial Vitals: /81 (BP Location: Left arm, Patient Position: Sitting, Cuff Size: Adult Regular)  Pulse 129  Temp 98.1  F (36.7  C) (Oral)  Resp 16  Ht 1.651 m (5' 5\")  Wt 82.6 kg (182 lb)  SpO2 99%  BMI 30.29 kg/m2 Estimated body mass index is 30.29 kg/(m^2) as calculated from the following:    Height as of this encounter: 1.651 m (5' 5\").    Weight as of this encounter: 82.6 kg (182 lb). Body surface area is 1.95 meters squared.  Extreme Pain (9) Comment: Chest and Back (Mass site), Upper ABD, Left Hip (Since Bx)   No LMP recorded. Patient has had a hysterectomy.  Allergies reviewed: Yes  Medications reviewed: Yes    Medications: Medication refills not needed today.  Pharmacy name entered into [facility]: [id number] - [place], [place] - [address] [address]    Clinical concerns: None Jessica Cox was NOT notified.    7 minutes for nursing intake (face to face time)     Naima Saavedra LPN              "

## 2017-11-03 NOTE — MR AVS SNAPSHOT
After Visit Summary   11/3/2017    Tisha Arias    MRN: [id number]           Patient Information     Date Of Birth          1960        Visit Information        Provider Department      11/3/2017 4:40 PM Jessica Cox PA-C [facility]        Today's Diagnoses     High grade B-cell lymphoma (H)    -  1    DCIS (ductal carcinoma in situ) of breast        Nausea           Follow-ups after your visit        Your next 10 appointments already scheduled     Nov 10, 2017  9:30 AM CST   [facility] Draw with  [facility] DRAW   [facility] ([facility])    [address] [place] [address]  2nd [facility] [address]   [phone]            Nov 10, 2017 10:00 AM CST   Infusion 360 with  ONCOLOGY INFUSION, UC 17 ATC   [facility] ([facility])    [address] [place] [address]   [phone]            Nov 10, 2017  4:40 PM CST   (Arrive by 4:25 PM)   Return Visit with Jessica Cox PA-C   [facility] ([facility])    [address] [place] [address]   [phone]            Nov 13, 2017  4:00 PM CST   [facility] Lab Draw with UC [facility] LAB DRAW   [facility] Mason[facility] Lab Draw ([facility])    [address] [place] [address]   [phone]            Nov 13, 2017  4:30 PM CST   (Arrive by 4:15 PM)   RETURN ENDOCRINE with Avelina Castro MD   [facility] Endocrinology ([facility])    [address] [place] [address]  3rd [facility] [address]   [phone]            Nov 16, 2017  6:30 AM CST   [facility] Lab Draw with  [facility] DRAW   [facility]    [address] [place] [address]   [phone]            Nov 16, 2017 12:30 PM CST   (Arrive  "by 12:15 PM)   Return Visit with Jessica Cox PA-C   [facility] ([facility])    [address] [address] [phone]            Nov 27, 2017  4:00 PM CST   (Arrive by 3:45 PM)   Return Visit with Shahla Crawley MD   [facility] ([facility])    [address] [address] [phone]              Who to contact     If you have questions or need follow up information about today's clinic visit or your schedule please contact [facility] directly at [phone].  Normal or non-critical lab and imaging results will be communicated to you by MyChart, letter or phone within 4 business days after the clinic has received the results. If you do not hear from us within 7 days, please contact the clinic through MyChart or phone. If you have a critical or abnormal lab result, we will notify you by phone as soon as possible.  Submit refill requests through [employer] or call your pharmacy and they will forward the refill request to us. Please allow 3 business days for your refill to be completed.          Additional Information About Your Visit        [email or web address] Information     [employer] gives you secure access to your electronic health record. If you see a primary care provider, you can also send messages to your care team and make appointments. If you have questions, please call your primary care clinic.  If you do not have a primary care provider, please call [phone] and they will assist you.        Care EveryWhere ID     This is your Care EveryWhere ID. This could be used by other organizations to access your [place] medical records  [id number]        Your Vitals Were     Pulse Temperature Respirations Height Pulse Oximetry BMI (Body Mass Index)    129 98.1  F (36.7  C) (Oral) 16 1.651 m (5' 5\") 99% 30.29 kg/m2       Blood Pressure from " Last 3 Encounters:   11/06/17 107/69   11/03/17 124/81   11/02/17 113/81    Weight from Last 3 Encounters:   11/06/17 83.3 kg (183 lb 9.6 oz)   11/03/17 82.6 kg (182 lb)   10/31/17 85 kg (187 lb 8 oz)              We Performed the Following     ABO/Rh type and screen     Blood component     Blood component     CBC with platelets differential     Comprehensive metabolic panel          Today's Medication Changes          These changes are accurate as of: 11/3/17 11:59 PM.  If you have any questions, ask your nurse or doctor.               Start taking these medicines.        Dose/Directions    prochlorperazine 10 MG tablet   Commonly known as:  COMPAZINE   Used for:  High grade B-cell lymphoma (H), Nausea   Started by:  Jessica Cox PA-C        Dose:  10 mg   Take 1 tablet (10 mg) by mouth every 6 hours as needed for nausea or vomiting   Quantity:  60 tablet   Refills:  3         These medicines have changed or have updated prescriptions.        Dose/Directions    calcitRIOL 0.25 MCG capsule   Commonly known as:  ROCALTROL   This may have changed:  additional instructions   Used for:  Postsurgical hypothyroidism, Papillary carcinoma, follicular variant (H), Metastasis to cervical lymph node (H)        TAKE 2 CAPSULES BY MOUTH EVERY MORNING AND TAKE 1 CAPSULE BY MOUTH EVERY NIGHT AT BEDTIME   Quantity:  270 capsule   Refills:  3       diazepam 5 MG tablet   Commonly known as:  VALIUM   This may have changed:    - when to take this  - additional instructions   Used for:  Chest wall pain        Dose:  5 mg   Take 1 tablet (5 mg) by mouth 3 times daily as needed For muscle spasms   Quantity:  90 tablet   Refills:  2       levothyroxine 112 MCG tablet   Commonly known as:  SYNTHROID/LEVOTHROID   This may have changed:    - how much to take  - additional instructions   Used for:  Postsurgical hypothyroidism, Papillary carcinoma, follicular variant (H), Postsurgical hypoparathyroidism (H), Thyroid cancer (H)         Mon-Sat: (2 tabs daily) Sunday: 3 tabs   Quantity:  189 tablet   Refills:  3       methocarbamol 750 MG tablet   Commonly known as:  ROBAXIN   This may have changed:    - when to take this  - additional instructions   Used for:  Myofascial pain        Dose:  750 mg   Take 1 tablet (750 mg) by mouth 4 times daily as needed . Ok to take a 5th Robaxin on very severe days.   Quantity:  360 tablet   Refills:  1            Where to get your medicines      These medications were sent to [id number] - [place], [place] - [address] [address] [place]  [address] [address] [place] [address]     Phone:  [phone]     prochlorperazine 10 MG tablet                Primary Care Provider Office Phone # Fax #    Shahla Crawley [phone]       [address] [place] [address]        Equal Access to Services     [facility]: Hadii sacha ku hadasho Soomaali, waaxda luqadaha, qaybta kaalmada adeegyada, waxay caridadin hayaan chanell yoder . So [facility] [phone].    ATENCIÓN: Si habla español, tiene a stiles disposición servicios gratuitos de asistencia lingüística. Oneil al [phone].    We comply with applicable federal civil rights laws and [place] laws. We do not discriminate on the basis of race, color, national origin, age, disability, sex, sexual orientation, or gender identity.            Thank you!     Thank you for choosing [facility]  for your care. Our goal is always to provide you with excellent care. Hearing back from our patients is one way we can continue to improve our services. Please take a few minutes to complete the written survey that you may receive in the mail after your visit with us. Thank you!             Your Updated Medication List - Protect others around you: Learn how to safely use, store and throw away your medicines at www.disposemymeds.org.          This list is accurate as of: 11/3/17 11:59 PM.  Always use your most recent med list.                    Brand Name Dispense Instructions for use Diagnosis    acyclovir 400 MG tablet    ZOVIRAX    60 tablet    Take 1 tablet (400 mg) by mouth 2 times daily    High grade B-cell lymphoma (H)       allopurinol 300 MG tablet    ZYLOPRIM    30 tablet    Take 1 tablet (300 mg) by mouth daily    High grade B-cell lymphoma (H)       aluminum chloride 20 % external solution    DRYSOL    60 mL    Apply topically At Bedtime    Rash, Intertrigo       AZMACORT IN      Inhale 2 puffs into the lungs as needed        bisacodyl 5 MG EC tablet     30 tablet    Take 3 tablets (15 mg) by mouth daily as needed for constipation    Constipation, unspecified constipation type       calcitRIOL 0.25 MCG capsule    ROCALTROL    270 capsule    TAKE 2 CAPSULES BY MOUTH EVERY MORNING AND TAKE 1 CAPSULE BY MOUTH EVERY NIGHT AT BEDTIME    Postsurgical hypothyroidism, Papillary carcinoma, follicular variant (H), Metastasis to cervical lymph node (H)       CALCIUM CITRATE +D 315-250 MG-UNIT Tabs per tablet   Generic drug:  calcium citrate-vitamin D     120 tablet    Take 2 tablets by mouth 3 times daily        celecoxib 200 MG capsule    celeBREX    56 capsule    TAKE ONE CAPSULE BY MOUTH TWICE DAILY    Chest wall pain       cetirizine 10 MG tablet    ZYRTEC ALLERGY    30 tablet    Take 1 tablet (10 mg) by mouth 3 times daily On hold for lab test.    High grade B-cell lymphoma (H)       COMPOUND CONTAINING CONTROLLED SUBSTANCE - PHARMACY TO MIX COMPOUNDED MEDICATION    CMPD RX    120 g    Apply small amount to affected area two times daily.    Neoplasm related pain       cromolyn 4 % ophthalmic solution    OPTICROM    10 mL    Place 1 drop into both eyes 4 times daily    Idiopathic mast cell activation syndrome (H)       cyclobenzaprine 10 MG tablet    FLEXERIL    90 tablet    Take 1 tablet (10 mg) by mouth 3 times daily as needed    High grade B-cell lymphoma (H)       dexamethasone 4 MG tablet    DECADRON    5 tablet    Take 8 mg (2 tab) by  mouth 11/1 and 11/2, then 4 mg (1 tab) by mouth 11/3.    High grade B-cell lymphoma (H)       diazepam 5 MG tablet    VALIUM    90 tablet    Take 1 tablet (5 mg) by mouth 3 times daily as needed For muscle spasms    Chest wall pain       diclofenac 1 % Gel topical gel    VOLTAREN    100 g    Apply affected area two times daily PRN using enclosed dosing card.    Myofascial pain       diphenhydrAMINE 50 MG capsule    BENADRYL    56 capsule    Take 1 capsule (50 mg) by mouth nightly as needed for itching or sleep    High grade B-cell lymphoma (H)       docusate sodium 100 MG tablet    COLACE    30 tablet    Take 100 mg by mouth daily    Acute constipation       ENOVARX-LIDOCAINE HCL 10 % Crea     60 g    Externally apply 1 g topically 3 times daily as needed    Chest wall pain       EPINEPHrine 0.3 MG/0.3ML injection 2-pack    EPIPEN 2-CARIDAD    0.6 mL    Inject 0.3 mLs (0.3 mg) into the muscle once as needed for anaphylaxis        ferrous sulfate 325 (65 FE) MG tablet    IRON    90 tablet    Take 1 tablet (325 mg) by mouth 3 times daily (with meals)    Status post bariatric surgery       fluconazole 200 MG tablet    DIFLUCAN    30 tablet    Take 1 tablet (200 mg) by mouth daily    High grade B-cell lymphoma (H)       furosemide 20 MG tablet    LASIX    4 tablet    Take 2 tablets (40 mg) by mouth daily for 2 days    High grade B-cell lymphoma (H)       hydrochlorothiazide 12.5 MG capsule    MICROZIDE    90 capsule    Take 1 capsule (12.5 mg) by mouth daily    Hypocalcemia       levofloxacin 250 MG tablet    LEVAQUIN    30 tablet    Take 1 tablet (250 mg) by mouth daily    High grade B-cell lymphoma (H)       levothyroxine 112 MCG tablet    SYNTHROID/LEVOTHROID    189 tablet    Mon-Sat: (2 tabs daily) Sunday: 3 tabs    Postsurgical hypothyroidism, Papillary carcinoma, follicular variant (H), Postsurgical hypoparathyroidism (H), Thyroid cancer (H)       lidocaine visc 2% 2.5mL/5mL & maalox/mylanta w/ simeth 2.5mL/5mL &  diphenhydrAMINE 5mg/5mL Susp suspension    [facility]    360 mL    Swish and spit 10 mLs in mouth every 6 hours as needed for mouth sores    Stomatitis and mucositis, High grade B-cell lymphoma (H)       lidocaine-prilocaine cream    EMLA    30 g    Apply topically as needed (port access pain.)    Neoplasm related pain, Chest wall pain       methocarbamol 750 MG tablet    ROBAXIN    360 tablet    Take 1 tablet (750 mg) by mouth 4 times daily as needed . Ok to take a 5th Robaxin on very severe days.    Myofascial pain       mometasone 110 MCG/INH inhaler    ASMANEX 30 METERED DOSES    3 Inhaler    Inhale 1 puff into the lungs daily    Intermittent asthma, uncomplicated       montelukast 10 MG tablet    SINGULAIR    120 tablet    TAKE TWO TABLETS BY MOUTH TWICE DAILY    Idiopathic mast cell activation syndrome (H)       * MS CONTIN PO      Take 60 mg by mouth daily Takes at 8pm        * morphine 30 MG 12 hr tablet    MS CONTIN    120 tablet    Take 1 tablet (30 mg) by mouth every 8 hours . Take an addition pill at night such that your evening dose is 60mg    Neoplasm related pain       NASALCROM 5.2 MG/ACT Aers Inhaler   Generic drug:  cromolyn sodium     1 Bottle    SPRAY ONE SPRAY( 1 ML) IN NOSTRIL DAILY    Mast cell disease, systemic       OMEGA 3 PO      Take 5 mLs by mouth        ondansetron 8 MG ODT tab    ZOFRAN ODT    90 tablet    Take 1 tablet (8 mg) by mouth every 8 hours as needed for nausea or vomiting    Nausea with vomiting       * order for DME     2 Units    Equipment being ordered: compression stockings. 20-30 mm or 30 - 40 mm as patient can tolerate. Physical therapy to determine if they should be above or below the knee.    Venous stasis       * order for DME     2 Device    Equipment being ordered: compression bra    Malignant neoplasm of right female breast, unspecified site of breast       * order for DME     1 Units    Compression stockings, medium grade, please measure and fit.  Please dispense a pair.    Peripheral edema       oxyCODONE IR 30 MG tablet    ROXICODONE    180 tablet    Take 1 tablet (30 mg) by mouth every 4 hours as needed for moderate to severe pain    High grade B-cell lymphoma (H)       polyethylene glycol powder    MIRALAX    510 g    Take 17 g (1 capful) by mouth daily    Acute constipation       potassium chloride SA 20 MEQ CR tablet    KLOR-CON    90 tablet    Take 1 tablet (20 mEq) by mouth daily    Hypokalemia       PROBIOTIC DAILY PO      Take 1 capsule by mouth daily Lacto acid bifidobacterium    Breast cancer, unspecified laterality, Thyroid cancer (H), Chronic arthralgias of knees and hips, unspecified laterality       prochlorperazine 10 MG tablet    COMPAZINE    60 tablet    Take 1 tablet (10 mg) by mouth every 6 hours as needed for nausea or vomiting    High grade B-cell lymphoma (H), Nausea       ranitidine 75 MG tablet    ZANTAC    270 tablet    Take 1 tablet (75 mg) by mouth 3 times daily    Mast cell disease, systemic       senna-docusate 8.6-50 MG per tablet    SENOKOT-S;PERICOLACE    60 tablet    Take 1-2 tablets by mouth 2 times daily as needed for constipation    Acute constipation       SUMAtriptan 50 MG tablet    IMITREX    9 tablet    Take 1 tablet (50 mg) by mouth at onset of headache for migraine (may repeat in 2 hours as needed, max 2 tablets daily)    Migraine without status migrainosus, not intractable, unspecified migraine type       tacrolimus 0.1 % ointment    PROTOPIC    60 g    Apply topically 2 times daily    Rash, Intertrigo       triamcinolone 0.025 % ointment    KENALOG    80 g    Apply topically 2 times daily Mix with 1 lb jar of vaseline or aquaphor    Intertrigo, Rash       TUMS 500 MG chewable tablet   Generic drug:  calcium carbonate     180 chew tab    Take 2 tablets (1,000 mg) by mouth nightly as needed for other (cramps)        * UNABLE TO FIND      3 tablets 3 times daily MEDICATION NAME: calcium D-Glucarate  3 caps contain  180mg of elemental calcium.        * UNABLE TO FIND      2 tablets 3 times daily MEDICATION NAME: Natural D-Hist (on hold during chemo)    Breast cancer, unspecified laterality, Papillary carcinoma, follicular variant (H), Chronic musculoskeletal pain       * UNABLE TO FIND      MEDICATION NAME: Tumeric 3 capsules daily (on hold during chemo)        * UNABLE TO FIND      Take 2 capsules by mouth 3 times daily Muscle Mag. 2 caps contain B1 20mg, B2 20mg, B6 10mg, magesium 20mg, manganese 2mg.        * UNABLE TO FIND      2 tablets 3 times daily MEDICATION NAME: Digestzymes    Thyroid cancer (H), Postsurgical hypothyroidism, Postsurgical hypoparathyroidism (H)       * UNABLE TO FIND      1 tablet daily MEDICATION NAME: Pure Encapsulations    Thyroid cancer (H), Postsurgical hypothyroidism, Postsurgical hypoparathyroidism (H)       VENTOLIN  (90 BASE) MCG/ACT Inhaler   Generic drug:  albuterol     18 g    INHALE 2 PUFFS INTO THE LUNGS EVERY 4 HOURS AS NEEDED FOR SHORTNESS OF BREATH OR DIFFICULT BREATHING OR WHEEZING    Mild intermittent asthma without complication       vitamin D3 2000 UNITS Caps     60 capsule    Take 5,000 Units by mouth daily Takes 2 tabs daily    Thyroid cancer (H), Postsurgical hypothyroidism, Papillary carcinoma, follicular variant (H), Postsurgical hypoparathyroidism (H)       * Notice:  This list has 11 medication(s) that are the same as other medications prescribed for you. Read the directions carefully, and ask your doctor or other care provider to review them with you.

## 2017-11-03 NOTE — NURSING NOTE
"Chief Complaint   Patient presents with     Port Draw     port accessed and labs drawn by rn.  vs taken.     Port accessed with 20g 3/4\" gripper needle, labs drawn, port flushed with saline and heparin, port de-accessed.vitals checked, pt checked in for next appointment.  Ioana Bragg RN    "

## 2017-11-03 NOTE — Clinical Note
11/3/2017       RE: Tisha Arias  [address]  [place] [address]     Dear Colleague,    Thank you for referring your patient, Tisha Arias, to the [facility]. Please see a copy of my visit note below.    No notes on file    Again, thank you for allowing me to participate in the care of your patient.      Sincerely,    Jessica Cox PA-C

## 2017-11-03 NOTE — LETTER
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 She is currently undergoing treatment with DA-R-EPOCH. Her past medical history is significant for breast cancer in 2011 s/p bilateral mastectomies and BENJIE/BSO up until recently on Tamoxifen, papillary thyroid cancer s/p total thyroidectomy, chronic chest wall pain, and asthma. Briefly, her oncologic history is as follows:    She presented in 8/2017 with dramatically worse chest and back pain. CT 9/1/17 showed lytic lesion right 10th rib extending to right T9-10 neural foramen with vertebral body invasion. There was associated conglomerate of lymphadenopathy around the mid T-spine. She had a CT guided biopsy showing B-cell high grade lymphoma. Pathology showed \"The morphology shows a population of large cells, diffuse lymphoid infiltrate. The cells are atypical with abundant mitotic and apoptotic activity and single cell necrosis. Tumor is CD45 and CD79a positive and focally weakly CD30 positive. The other stains revealed positivity for CD20, BCL6, BCL2 and MUM1, and CD10 and CD21 negative. The CD5 and CD3 highlighted background T-cells.  MYC expression was equivocal with approximately 40% nuclei staining.  Ki-67 proliferative index is greater than 90-95%. The immunohistochemistry is suggestive of non-GCB immunophenotype of diffuse large B-cell lymphoma. The cytogenetics did not show rearrangement of the BCL2 or c-MYC. There is the presence of BCL6 rearrangement in 78% of cells and gains of MYC and BCL2, but no amplifications.\"     Staging LP and BMBx were negative for lymphoma. She started DA-REPOCH 10/6/17. She did well with chemo other than rigors during CIVI. D/c 10/11/17. Given Neulasta 10/12/17. Post cycle 1 complicated " by mucositis and worsening of chronic LE peripheral edema. She began cycle 2 on 10/27/17. Due to a rise in her LD and uric acid levels on that admission day, she underwent a CT scan which showed response to therapy with improvement in her mediastinal lymphadenopathy and right chest wall mass. She comes in today for routine hospital follow up.     Interval History:  She reports that overall since hospital discharge she has been doing okay. She reports feeling tired. She did have nausea and dry heaves last night and has been taking Zofran. She continues to have the feeling that her whole body is shaky which has been present since starting with chemotherapy. She is not interested in seeing a neurologist at this time. She reports headaches that start in the back of her head and go to the front of her head. She did have an episode of blurred vision with driving recently but this has since improved. She does feel safe to continue to drive. She has been having difficulty with constipation and is taking MiraLAX once a day, 2 Senna-S twice a day, and 2 Dulcolax a day. Her last bowel movement was 3 days ago. Her leg swelling is managed with compression stockings, though still present. She otherwise denies concerns.    Current Outpatient Prescriptions   Medication Sig Dispense Refill     prochlorperazine (COMPAZINE) 10 MG tablet Take 1 tablet (10 mg) by mouth every 6 hours as needed for nausea or vomiting 60 tablet 3     ENOVARX-LIDOCAINE HCL 10 % CREA Externally apply 1 g topically 3 times daily as needed 60 g 3     fluconazole (DIFLUCAN) 200 MG tablet Take 1 tablet (200 mg) by mouth daily 30 tablet 0     cetirizine (ZYRTEC ALLERGY) 10 MG tablet Take 1 tablet (10 mg) by mouth 3 times daily On hold for lab test. 30 tablet 0     acyclovir (ZOVIRAX) 400 MG tablet Take 1 tablet (400 mg) by mouth 2 times daily 60 tablet 0     dexamethasone (DECADRON) 4 MG tablet Take 8 mg (2 tab) by mouth 11/1 and 11/2, then 4 mg (1 tab) by mouth  11/3. 5 tablet 0     diclofenac (VOLTAREN) 1 % GEL topical gel Apply affected area two times daily PRN using enclosed dosing card. 100 g 0     furosemide (LASIX) 20 MG tablet Take 2 tablets (40 mg) by mouth daily for 2 days 4 tablet 0     levofloxacin (LEVAQUIN) 250 MG tablet Take 1 tablet (250 mg) by mouth daily 30 tablet 0     allopurinol (ZYLOPRIM) 300 MG tablet Take 1 tablet (300 mg) by mouth daily 30 tablet 0     ferrous sulfate (IRON) 325 (65 FE) MG tablet Take 1 tablet (325 mg) by mouth 3 times daily (with meals) 90 tablet 0     bisacodyl (DULCOLAX) 5 MG EC tablet Take 3 tablets (15 mg) by mouth daily as needed for constipation 30 tablet 0     docusate sodium (COLACE) 100 MG tablet Take 100 mg by mouth daily 30 tablet 0     polyethylene glycol (MIRALAX) powder Take 17 g (1 capful) by mouth daily 510 g 0     magic mouthwash suspension (diphenhydrAMINE, lidocaine, aluminum-magnesium & simethicone) Swish and spit 10 mLs in mouth every 6 hours as needed for mouth sores 360 mL 1     cyclobenzaprine (FLEXERIL) 10 MG tablet Take 1 tablet (10 mg) by mouth 3 times daily as needed 90 tablet 0     senna-docusate (SENOKOT-S;PERICOLACE) 8.6-50 MG per tablet Take 1-2 tablets by mouth 2 times daily as needed for constipation 60 tablet 0     oxyCODONE (ROXICODONE) 30 MG IR tablet Take 1 tablet (30 mg) by mouth every 4 hours as needed for moderate to severe pain 180 tablet 0     morphine (MS CONTIN) 30 MG 12 hr tablet Take 1 tablet (30 mg) by mouth every 8 hours . Take an addition pill at night such that your evening dose is 60mg 120 tablet 0     UNABLE TO FIND Take 2 capsules by mouth 3 times daily Muscle Mag. 2 caps contain B1 20mg, B2 20mg, B6 10mg, magesium 20mg, manganese 2mg.       Morphine Sulfate (MS CONTIN PO) Take 60 mg by mouth daily Takes at 8pm       lidocaine-prilocaine (EMLA) cream Apply topically as needed (port access pain.) 30 g 1     SUMAtriptan (IMITREX) 50 MG tablet Take 1 tablet (50 mg) by mouth at onset  of headache for migraine (may repeat in 2 hours as needed, max 2 tablets daily) 9 tablet 3     calcitRIOL (ROCALTROL) 0.25 MCG capsule TAKE 2 CAPSULES BY MOUTH EVERY MORNING AND TAKE 1 CAPSULE BY MOUTH EVERY NIGHT AT BEDTIME (Patient taking differently: TAKE 2 CAPSULES BY MOUTH EVERY MORNING AND TAKE 1 CAPSULE BY MOUTH at noon) 270 capsule 3     celecoxib (CELEBREX) 200 MG capsule TAKE ONE CAPSULE BY MOUTH TWICE DAILY  56 capsule 0     order for DME Compression stockings, medium grade, please measure and fit. Please dispense a pair. 1 Units 0     methocarbamol (ROBAXIN) 750 MG tablet Take 1 tablet (750 mg) by mouth 4 times daily as needed . Ok to take a 5th Robaxin on very severe days. (Patient taking differently: Take 750 mg by mouth 4 times daily . Ok to take a 5th Robaxin on very severe days.) 360 tablet 1     ondansetron (ZOFRAN-ODT) 8 MG ODT tab Take 1 tablet (8 mg) by mouth every 8 hours as needed for nausea or vomiting 90 tablet 3     diazepam (VALIUM) 5 MG tablet Take 1 tablet (5 mg) by mouth 3 times daily as needed For muscle spasms (Patient taking differently: Take 5 mg by mouth 3 times daily For muscle spasms) 90 tablet 2     levothyroxine (SYNTHROID/LEVOTHROID) 112 MCG tablet Mon-Sat: (2 tabs daily) Sunday: 3 tabs (Patient taking differently: 112 mcg Mon-Sat: (2 tabs daily) Sunday: 3 tabs) 189 tablet 3     hydrochlorothiazide (MICROZIDE) 12.5 MG capsule Take 1 capsule (12.5 mg) by mouth daily 90 capsule 2     montelukast (SINGULAIR) 10 MG tablet TAKE TWO TABLETS BY MOUTH TWICE DAILY 120 tablet 11     EPINEPHrine (EPIPEN 2-CARIDAD) 0.3 MG/0.3ML injection Inject 0.3 mLs (0.3 mg) into the muscle once as needed for anaphylaxis 0.6 mL 3     potassium chloride SA (POTASSIUM CHLORIDE) 20 MEQ CR tablet Take 1 tablet (20 mEq) by mouth daily 90 tablet 3     cromolyn (OPTICROM) 4 % ophthalmic solution Place 1 drop into both eyes 4 times daily 10 mL 5     NASALCROM 5.2 MG/ACT AERS Inhaler SPRAY ONE SPRAY( 1 ML) IN  NOSTRIL DAILY 1 Bottle 6     Cholecalciferol (VITAMIN D3) 2000 UNITS CAPS Take 5,000 Units by mouth daily Takes 2 tabs daily 60 capsule 4     order for DME Equipment being ordered: compression stockings. 20-30 mm or 30 - 40 mm as patient can tolerate. Physical therapy to determine if they should be above or below the knee. 2 Units 4     ranitidine (ZANTAC) 75 MG tablet Take 1 tablet (75 mg) by mouth 3 times daily 270 tablet 3     calcium citrate-vitamin D (CALCIUM CITRATE +D) 315-250 MG-UNIT TABS Take 2 tablets by mouth 3 times daily 120 tablet      UNABLE TO FIND 3 tablets 3 times daily MEDICATION NAME: calcium D-Glucarate   3 caps contain 180mg of elemental calcium.       UNABLE TO FIND 2 tablets 3 times daily MEDICATION NAME: Digestzymes        UNABLE TO FIND 1 tablet daily MEDICATION NAME: Pure Encapsulations       Probiotic Product (PROBIOTIC DAILY PO) Take 1 capsule by mouth daily Lacto acid bifidobacterium       diphenhydrAMINE (BENADRYL) 50 MG capsule Take 1 capsule (50 mg) by mouth nightly as needed for itching or sleep (Patient not taking: Reported on 11/3/2017) 56 capsule      COMPOUND CONTAINING CONTROLLED SUBSTANCE (CMPD RX) - PHARMACY TO MIX COMPOUNDED MEDICATION Apply small amount to affected area two times daily. (Patient not taking: Reported on 11/3/2017) 120 g 6     VENTOLIN  (90 BASE) MCG/ACT Inhaler INHALE 2 PUFFS INTO THE LUNGS EVERY 4 HOURS AS NEEDED FOR SHORTNESS OF BREATH OR DIFFICULT BREATHING OR WHEEZING (Patient not taking: Reported on 11/3/2017) 18 g 3     order for DME Equipment being ordered: compression bra (Patient not taking: Reported on 11/3/2017) 2 Device 1     aluminum chloride (DRYSOL) 20 % external solution Apply topically At Bedtime (Patient not taking: Reported on 11/3/2017) 60 mL 3     tacrolimus (PROTOPIC) 0.1 % ointment Apply topically 2 times daily (Patient not taking: Reported on 11/3/2017) 60 g 3     triamcinolone (KENALOG) 0.025 % ointment Apply topically 2 times  "daily Mix with 1 lb jar of vaseline or aquaphor (Patient not taking: Reported on 11/3/2017) 80 g 3     UNABLE TO FIND MEDICATION NAME: Tumeric 3 capsules daily (on hold during chemo)       mometasone (ASMANEX 30 METERED DOSES) 110 MCG/INH inhaler Inhale 1 puff into the lungs daily (Patient not taking: Reported on 11/3/2017) 3 Inhaler 3     UNABLE TO FIND 2 tablets 3 times daily MEDICATION NAME: Natural D-Hist (on hold during chemo)       calcium carbonate (TUMS) 500 MG chewable tablet Take 2 tablets (1,000 mg) by mouth nightly as needed for other (cramps) 180 chew tab 3     Triamcinolone Acetonide (AZMACORT IN) Inhale 2 puffs into the lungs as needed       Omega-3 Fatty Acids (OMEGA 3 PO) Take 5 mLs by mouth       Physical Examination:  /81 (BP Location: Left arm, Patient Position: Sitting, Cuff Size: Adult Regular)  Pulse 129  Temp 98.1  F (36.7  C) (Oral)  Resp 16  Ht 1.651 m (5' 5\")  Wt 82.6 kg (182 lb)  SpO2 99%  BMI 30.29 kg/m2  Wt Readings from Last 10 Encounters:   11/06/17 83.3 kg (183 lb 9.6 oz)   11/03/17 82.6 kg (182 lb)   10/31/17 85 kg (187 lb 8 oz)   10/27/17 85.1 kg (187 lb 9.8 oz)   10/23/17 85.9 kg (189 lb 4.8 oz)   10/23/17 85.9 kg (189 lb 4.8 oz)   10/21/17 87.1 kg (192 lb)   10/19/17 87.1 kg (192 lb)   10/16/17 88.2 kg (194 lb 8 oz)   10/11/17 87.9 kg (193 lb 12.6 oz)   Constitutional: Well-appearing female in no acute distress.  Eyes: EOMI, PERRL. No scleral icterus.  ENT: Oral mucosa is moist without lesions or thrush.   Lymphatic: Neck is supple without cervical or supraclavicular lymphadenopathy.   Cardiovascular: Regular rate and rhythm.  Respiratory: Clear to auscultation bilaterally. No wheezes or crackles.  Gastrointestinal: Bowel sounds present. Abdomen soft,mildly tender in the RUQ, remainder nontender. No palpable hepatosplenomegaly or masses.   Neurologic: Cranial nerves II through XII are intact.   Skin: No rashes, petechiae, or bruising noted on exposed " "skin.  Extremities: 1+ lower extremity edema bilaterally, compression stockings in place.    Laboratory Data:   11/3/2017 16:28   Sodium 139   Potassium 3.7   Chloride 98   Carbon Dioxide 33 (H)   Urea Nitrogen 26   Creatinine 0.82   GFR Estimate 71   GFR Estimate If Black 86   Calcium 8.6   Anion Gap 8   Albumin 3.3 (L)   Protein Total 6.2 (L)   Bilirubin Total 0.4   Alkaline Phosphatase 75   ALT 37   AST 14   Glucose 129 (H)   WBC 19.0 (H)   Hemoglobin 9.7 (L)   Hematocrit 31.9 (L)   Platelet Count 259   RBC Count 3.72 (L)   MCV 86   MCH 26.1 (L)   MCHC 30.4 (L)   RDW 19.6 (H)   Diff Method Manual Differential   % Neutrophils 97.4   % Lymphocytes 2.6   % Monocytes 0.0   % Eosinophils 0.0   % Basophils 0.0   Absolute Neutrophil 18.5 (H)   Absolute Lymphocytes 0.5 (L)   Absolute Monocytes 0.0   Absolute Eosinophils 0.0   Absolute Basophils 0.0   Anisocytosis Moderate   Hyper Segmented PMNs Present     Assessment and Plan:    1. DLBCL, \"double-hit\"-like, currently on treatment with DA-R-EPOCH  Cycle 1 overall tolerated well other than mucositis and mild infusion reaction (rigors) to continuous infusion of chemotherapy. Interval CT CAP after 1 cycle due to elevated LD and uric acid showed response to treatment. She is thus far tolerating cycle 2 fairly well with some fatigue, nausea, and constipation. We discussed Compazine for nausea. She will return twice weekly for labs with possible transfusions. She will see me again in 1 week. She will be admitted for cycle 3 on 11/16. I will check with Dr. Sauceda regarding the timing of her next PET/CT, after 3 or 4 cycles of chemotherapy.     2. Sensation of body shaking.  Present since chemotherapy. Possibly a side effect. She declines a referral to neurology at this time. If worsens, will need to reconsider referral.     3. Heme  No transfusion needs today. Continue to monitor with twice weekly labs and transfuse if Hgb<8 and Plt<10k. Neutrophilia secondary to Neulasta, " given on 11/2 with no evidence of infection on H&P today.    4. ID  Continue ppx ACV 400mg BID, levaquin 250mg daily, and fluconazole 200mg daily.    5. Lower extremity edema with pain  Chronic condition likely exacerbated by steroids and excessive fluids. US on 10/21 negative for DVT bilaterally. Continue Lasix 20 mg x 3 more days. Continue pain medications as prescribed by palliative care.    6. History of stage 1, ER/AK+, HER2- breast cancer s/p bilateral mastectomies and BENJIE/BSO  Follows with Dr. Crawley. Oncotype recurrence score 11%. Was initially on Arimidex ([id number]) but changed to Tamoxifen due to arthralgias. Tamoxifen held since 10/5 and continue to hold with chemotherapy. F/u Dr. Crawley 11/27/17    7. History of papillary thyroid cancer, s/p total thyroidectomy  Follows with Dr. Castro. Has post surgical hypothyroidism. Continue levothyroxine and calcitriol as prescribed. Neck u/s on 11/2 shows no evidence of disease.    8. History of Rachael en Y gastric bypass  Continue supplements (calcium citrate-vitamin D, vitamin D3, magnesium citrate). Also has iron deficiency anemia likely from poor absorption and per Dr. Sauceda, she should continue 325mg PO ferrous sulfate TID. She should take stool softeners if issues with iron induced constipation.    9. Chronic chest wall pain s/p mastectomies  Follows with Dr. Benitez. Palliative care to continue to prescribe pain medications. Taking MS Contin, Oxycodone for breakthrough pain, and celebrex.     10. Constipation.  Recommend increasing Dulcolax temporarily until resumes regular bowel movements. Continue with MiraLax and Senna-S. If no results in 1-2 days, recommend considering a dose of magnesium citrate.        Jessica Cox PA-C  [facility]  [address] [place] [address] [phone]

## 2017-11-04 LAB
BACTERIA SPEC CULT: NO GROWTH
SPECIMEN SOURCE: NORMAL

## 2017-11-06 ENCOUNTER — INFUSION THERAPY VISIT (OUTPATIENT)
Dept: ONCOLOGY | Facility: CLINIC | Age: 57
End: 2017-11-06
Payer: COMMERCIAL

## 2017-11-06 ENCOUNTER — APPOINTMENT (OUTPATIENT)
Dept: LAB | Facility: CLINIC | Age: 57
End: 2017-11-06
Payer: COMMERCIAL

## 2017-11-06 VITALS
BODY MASS INDEX: 30.55 KG/M2 | DIASTOLIC BLOOD PRESSURE: 69 MMHG | SYSTOLIC BLOOD PRESSURE: 107 MMHG | OXYGEN SATURATION: 100 % | WEIGHT: 183.6 LBS | TEMPERATURE: 97.3 F | HEART RATE: 82 BPM

## 2017-11-06 DIAGNOSIS — C85.10 HIGH GRADE B-CELL LYMPHOMA (H): Primary | ICD-10-CM

## 2017-11-06 DIAGNOSIS — C83.33 DIFFUSE LARGE B-CELL LYMPHOMA OF INTRA-ABDOMINAL LYMPH NODES (H): ICD-10-CM

## 2017-11-06 LAB
ALBUMIN SERPL-MCNC: 2.7 G/DL (ref 3.4–5)
ALP SERPL-CCNC: 52 U/L (ref 40–150)
ALT SERPL W P-5'-P-CCNC: 26 U/L (ref 0–50)
ANION GAP SERPL CALCULATED.3IONS-SCNC: 6 MMOL/L (ref 3–14)
AST SERPL W P-5'-P-CCNC: 10 U/L (ref 0–45)
BILIRUB SERPL-MCNC: 0.3 MG/DL (ref 0.2–1.3)
BLD PROD TYP BPU: NORMAL
BLD PROD TYP BPU: NORMAL
BLD UNIT ID BPU: 0
BLD UNIT ID BPU: 0
BLOOD PRODUCT CODE: NORMAL
BLOOD PRODUCT CODE: NORMAL
BPU ID: NORMAL
BPU ID: NORMAL
BUN SERPL-MCNC: 17 MG/DL (ref 7–30)
CALCIUM SERPL-MCNC: 7.9 MG/DL (ref 8.5–10.1)
CHLORIDE SERPL-SCNC: 105 MMOL/L (ref 94–109)
CO2 SERPL-SCNC: 32 MMOL/L (ref 20–32)
CREAT SERPL-MCNC: 0.73 MG/DL (ref 0.52–1.04)
DIFFERENTIAL METHOD BLD: ABNORMAL
ERYTHROCYTE [DISTWIDTH] IN BLOOD BY AUTOMATED COUNT: 18.8 % (ref 10–15)
GFR SERPL CREATININE-BSD FRML MDRD: 82 ML/MIN/1.7M2
GLUCOSE SERPL-MCNC: 108 MG/DL (ref 70–99)
HCT VFR BLD AUTO: 25 % (ref 35–47)
HGB BLD-MCNC: 7.5 G/DL (ref 11.7–15.7)
MCH RBC QN AUTO: 26 PG (ref 26.5–33)
MCHC RBC AUTO-ENTMCNC: 30 G/DL (ref 31.5–36.5)
MCV RBC AUTO: 87 FL (ref 78–100)
PLATELET # BLD AUTO: 111 10E9/L (ref 150–450)
POTASSIUM SERPL-SCNC: 3.9 MMOL/L (ref 3.4–5.3)
PROT SERPL-MCNC: 5.2 G/DL (ref 6.8–8.8)
RBC # BLD AUTO: 2.89 10E12/L (ref 3.8–5.2)
SODIUM SERPL-SCNC: 143 MMOL/L (ref 133–144)
TRANSFUSION STATUS PATIENT QL: NORMAL
WBC # BLD AUTO: 0.1 10E9/L (ref 4–11)

## 2017-11-06 PROCEDURE — 85027 COMPLETE CBC AUTOMATED: CPT

## 2017-11-06 PROCEDURE — 25000128 H RX IP 250 OP 636: Mod: ZF

## 2017-11-06 PROCEDURE — P9016 RBC LEUKOCYTES REDUCED: HCPCS | Performed by: PHYSICIAN ASSISTANT

## 2017-11-06 PROCEDURE — 80053 COMPREHEN METABOLIC PANEL: CPT | Performed by: PHYSICIAN ASSISTANT

## 2017-11-06 PROCEDURE — 36430 TRANSFUSION BLD/BLD COMPNT: CPT

## 2017-11-06 RX ORDER — HEPARIN SODIUM (PORCINE) LOCK FLUSH IV SOLN 100 UNIT/ML 100 UNIT/ML
5 SOLUTION INTRAVENOUS EVERY 8 HOURS
Status: DISCONTINUED | OUTPATIENT
Start: 2017-11-06 | End: 2017-11-06 | Stop reason: HOSPADM

## 2017-11-06 RX ORDER — EPINEPHRINE 0.3 MG/.3ML
INJECTION SUBCUTANEOUS
Status: DISCONTINUED
Start: 2017-11-06 | End: 2017-11-06 | Stop reason: WASHOUT

## 2017-11-06 RX ADMIN — SODIUM CHLORIDE, PRESERVATIVE FREE 5 ML: 5 INJECTION INTRAVENOUS at 09:52

## 2017-11-06 RX ADMIN — SODIUM CHLORIDE, PRESERVATIVE FREE 5 ML: 5 INJECTION INTRAVENOUS at 06:45

## 2017-11-06 ASSESSMENT — PAIN SCALES - GENERAL: PAINLEVEL: EXTREME PAIN (8)

## 2017-11-06 NOTE — PROGRESS NOTES
Infusion Nursing Note:  Tisha Arias presents today for 2 units RBCs.    Patient seen by provider today: No    Treatment Conditions:  Lab Results   Component Value Date    HGB 7.5 11/06/2017     Lab Results   Component Value Date    WBC 0.1 11/06/2017      Lab Results   Component Value Date    ANEU 18.5 11/03/2017     Lab Results   Component Value Date     11/06/2017      Lab Results   Component Value Date     11/06/2017                   Lab Results   Component Value Date    POTASSIUM 3.9 11/06/2017           Lab Results   Component Value Date    MAG 2.2 10/30/2017            Lab Results   Component Value Date    CR 0.73 11/06/2017                   Lab Results   Component Value Date    EMLO 7.9 11/06/2017                Lab Results   Component Value Date    BILITOTAL 0.3 11/06/2017           Lab Results   Component Value Date    ALBUMIN 2.7 11/06/2017                    Lab Results   Component Value Date    ALT 26 11/06/2017           Lab Results   Component Value Date    AST 10 11/06/2017           Intravenous Access:  Implanted Port.  Access dc'd at time of discharge.      Note:   Results reviewed, copy given to patient.  Proceed with treatment.    Copy of AVS given to patient. + Blood return from PIV pre and post infusion.  Tolerated infusion without incident. No Prescriptions filled today.   D/C in care of self.  Pt will return 11/10 to see COTY Jasso for next appointment. IB sent to COTY Jasso and Dr Sauceda, about decreased WBC.  Pt is on prophylactic ABX and Neutropenic precautions discussed.  Pt verbalizes she is taking Fluconazole, Levaquin, and Acyclovir.  COTY Jasso requesting RBC/PLT appt for 11/10 and provider will see pt in the infusion room.        Janee Mitchell, RN

## 2017-11-06 NOTE — MR AVS SNAPSHOT
After Visit Summary   11/6/2017    Tisha Arias    MRN: [id number]           Patient Information     Date Of Birth          1960        Visit Information        Provider Department      11/6/2017 7:00 AM KLARISSA 10 ATC;  ONCOLOGY INFUSION 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  Neutropenia means that you have fewer white blood cells than normal.   Your white blood cells protect you from infection. If your white blood cells become too low, your risk of infection increases.   Signs of an infection include:    Any temperature more than 100.4 F (38 C) (taken under the tongue)    Shaking chills    Pain when urinating    Pain when breathing    Cough or sore throat    A red, swollen or painful cut or wound, or fluid coming from a cut or wound.  How is neutropenia treated?  If you already have an infection, your care team may give you medicine to help fight it. If you do not have an infection, you may receive:     Neulasta (pegfilgrastim)    Neupogen (filgrastim)    Other: ________________________  These medicines can help your body make more white blood cells. Your care team will tell you if medicine is right for you. You will need a number of blood tests to see how well your treatment is working.  What else can I do to prevent infection?    Wash your hands often, including before meals and after using the toilet. Hand washing is the best way to prevent infections. Remind visitors to wash their hands as well.    Take a warm shower each day and pat your skin dry.    Rinse your mouth with mild salt water four times a day.    Brush your teeth with a soft toothbrush after meals. Floss gently.    Take steps to prevent cuts or scrapes, which can lead to infection. For example: use an electric razor, be  careful with sharp objects, wear gloves when possible and don't go barefoot.    If you have a cut or scrape, wash the area with soap and water, then cover it with a clean bandage until it heals.    Use lotion to prevent cracks in your skin.    Do not cut your cuticles. Use cream removers instead. Do not wear fake fingernails.    Try to prevent constipation (hard stools). Drink plenty of fluids. If you can, eat whole grains and fresh fruits and vegetables. Ask your care team if you should take a stool softener (such as Colace).    Wipe yourself from front to back after using the toilet.    Avoid:    People who have a cold or the flu    Young children who have recently received a live vaccine    Large crowds    Fresh plants, flowers, dried rodriguez and dirt.    Do not have surgery or dental work.    Do not have sex until your white blood cells are back to normal. Do not use enemas, suppositories, douches or tampons.    Ask others to clean up after pets.    Always talk to your cancer doctor before receiving shots (immunization) .  When should I call my care team?  Call your care team if:    Your temperature is more than 100.4 F (38 C) (taken under the tongue).    You have chills and are shaking.    You have pain when urinating (using the toilet), or you need to pee more often than normal.    You have itching or unusual drainage from your vagina.    You have pain when breathing, or you are having a hard time breathing.    You have a cough or sore throat.    You have soft white patches on your tongue or the sides of your mouth.    You see redness, swelling or fluid draining from a cut or wound.    You suddenly feel very weak and tired.  Comments:  ____________________________________  _______________________________________  _______________________________________  _______________________________________  _______________________________________  _______________________________________  Food Safety Guidelines  Buying  "food    Buy foods before the expiration date listed on food labels. (also called the \"use by\" date.)    Check that milk, apple cider, egg and cheese products have been pasteurized. This should be printed on their labels.    Buy fruits and vegetables with unbroken skins.  Preparing food    Check the labels on your foods. Throw away any foods that are past their \"use-by\" dates.    Wash your hands before making food.    Use soap and water to wash the surfaces you use to prepare food. Dry the surfaces well. Use only clean utensils.    Wash all raw fruits and vegetables.    Keep uncooked foods separate from cooked foods.    Use one cutting board (non-wood) for raw meats, another for fruits and vegetables, and a third for prepared foods. Wash cutting boards often. This will help prevent cross-contamination.    Thaw frozen food in the refrigerator or in cold water. Do not thaw at room temperature.    Cook raw meat well. The temperature inside the meat should stay at the following for at least 15 seconds:    165 F (74 C) or higher for poultry (whole or ground chicken or turkey)    155 F (68 C) or higher for ground meat (beef, pork) or ground fish    145 F (63 C) for whole beef, pork or fish  Meals and snacks    Wash your hands before eating. Always use clean plates, glasses and utensils.    Avoid raw fish, raw meat, uncooked eggs, raw cookie dough and natural cheese. (This includes moldy cheese, such as blue cheese, as well as cheese made with unpasteurized milk).    Do not drink from a can or bottle. Wash the can or bottle before opening it, and then pour the drink into a cup.  Storing leftovers    Throw out leftovers that have been at room temperature longer than two hours.    Throw out leftovers older than two days.    When reheating leftovers, the temperature inside should be at 165 F (74 C) for at least 15 seconds.    Refrigerated foods should be stored at of 40 F (4.5 C) or less.  Dining out    Ask staff to make sure " there are no raw ingredients (such as raw eggs or meat) in your food.    Ask that egg dishes be fully cooked. Meat should be medium-well or well-done.    Avoid buffets and salad bars.  For informational purposes only. Not to replace the advice of your health care provider.   Copyright   2006 [facility]. All rights reserved. [employer] 255103 - REV 10/15..  Post Blood Products Discharge Instructions    You have just received a blood product.  During the next 48 hours, please be aware of the following symptoms of a blood product reaction.  If you should experience any of these symptoms call your physician.    -Temperature 100.0 or greater  -Shaking or chills  -Shortness of breath or wheezing  -Headache  -Persistent non-productive cough  -Hives  -Itching  -Extreme weakness  -Facial swelling or flushing  -Red urine    If you experience any of these symptoms, please call triage at  [phone] (Monday through  Friday 8:00 AM-4:30 PM):     If after hours, weekends or holidays, please call:  [phone] and ask for the doctor on call for Oncology/Hematology or Women's Health service           November 2017 Sunday Monday Tuesday Wednesday Thursday Friday Saturday                  1     2     [facility]    4:00 PM   (15 min.)   [facility]   [facility] Lab Draw     [facility] INJECTION    4:15 PM   (30 min.)   Nurse,  Oncology Injection   [facility] HEAD NECK SOFT TISSUE    5:00 PM   (60 min.)   US3   [facility] US     LAB    6:00 PM   (15 min.)    LAB   [facility] Lab 3     [facility]    4:00 PM   (15 min.)    [facility] DRAW   [facility] Lab Draw     [facility] RETURN    4:25 PM   (50 min.)   Jessica Cox PA-C   [facility] 4       5     6     [facility]    6:30 AM   (15 min.)    [facility] DRAW   [facility] Lab Draw     [facility] ONC INFUSION 360    7:00 AM   (360 min.)    ONCOLOGY  INFUSION   [facility] 7     8     9     10     [facility]    4:00 PM   (15 min.)    [facility]     [facility] RETURN    4:25 PM   (50 min.)   Jessica Cox PA-C   [facility] 11       12     13     [facility]    4:00 PM   (15 min.)    [facility]     [facility] RETURN ENDOCRINE    4:15 PM   (30 min.)   Avelina Castro MD   [facility] Endocrinology 14     15     16     [facility]    6:30 AM   (15 min.)    [facility] DRAW   [facility] Lab Draw     [facility] RETURN   12:15 PM   (50 min.)   Jessica Cox PA-C   [facility] 17     18       19     20     21     22     23     24     25       26     27     [facility] RETURN    3:45 PM   (30 min.)   Shahla Crawley MD   [facility] 28 29 30 December 2017 Sunday Monday Tuesday Wednesday Thursday Friday Saturday                            1     2       3     4     5     6     7     8     9       10     11     12     13     14     15     16       17     18     19     20     21     22     23       24     25     26     27     28     29     30       31                                               Recent Results (from the past 24 hour(s))   CBC with platelets differential    Collection Time: 11/06/17  6:46 AM   Result Value Ref Range    WBC 0.1 (LL) 4.0 - 11.0 10e9/L    RBC Count 2.89 (L) 3.8 - 5.2 10e12/L    Hemoglobin 7.5 (L) 11.7 - 15.7 g/dL    Hematocrit 25.0 (L) 35.0 - 47.0 %    MCV 87 78 - 100 fl    MCH 26.0 (L) 26.5 - 33.0 pg    MCHC 30.0 (L) 31.5 - 36.5 g/dL    RDW 18.8 (H) 10.0 - 15.0 %    Platelet Count 111 (L) 150 - 450 10e9/L    Diff Method WBC <0.5, Diff not done    Comprehensive metabolic panel    Collection Time: 11/06/17  6:46 AM   Result Value Ref Range    Sodium 143 133 - 144 mmol/L    Potassium 3.9 3.4 - 5.3 mmol/L    Chloride 105 94 - 109 mmol/L     Carbon Dioxide 32 20 - 32 mmol/L    Anion Gap 6 3 - 14 mmol/L    Glucose 108 (H) 70 - 99 mg/dL    Urea Nitrogen 17 7 - 30 mg/dL    Creatinine 0.73 0.52 - 1.04 mg/dL    GFR Estimate 82 >60 mL/min/1.7m2    GFR Estimate If Black >90 >60 mL/min/1.7m2    Calcium 7.9 (L) 8.5 - 10.1 mg/dL    Bilirubin Total 0.3 0.2 - 1.3 mg/dL    Albumin 2.7 (L) 3.4 - 5.0 g/dL    Protein Total 5.2 (L) 6.8 - 8.8 g/dL    Alkaline Phosphatase 52 40 - 150 U/L    ALT 26 0 - 50 U/L    AST 10 0 - 45 U/L                 Follow-ups after your visit        Your next 10 appointments already scheduled     Nov 10, 2017  4:00 PM CST   [facility] with  [facility] D[facility]   [facility] ([facility])    [address] [place] [id number] [phone]            Nov 10, 2017  4:40 PM CST   (Arrive by 4:25 PM)   Return Visit with Jessica Cox PA-C   [facility] ([facility])    [address] [place] [address]   [phone]            Nov 13, 2017  4:00 PM CST   [facility] Draw with  [facility] DRAW   [facility] ([facility])    [address] [place] [id number] [phone]            Nov 13, 2017  4:30 PM CST   (Arrive by 4:15 PM)   RETURN ENDOCRINE with Avelina Castro MD   [facility] Endocrinology ([facility])    [address] [place] [address]  3rd [facility] [id number] [phone]            Nov 16, 2017  6:30 AM CST   [facility] Draw with  [facility] DRAW   [facility] ([facility])    [address] [place] [id number]   [phone]            Nov 16, 2017 12:30 PM CST   (Arrive by 12:15 PM)   Return Visit with Jessica Cox PA-C   [facility] ([facility])    [address] [place] [address]  Floor  [facility] [id number] [phone]            Nov 27, 2017  4:00 PM CST   (Arrive by 3:45 PM)   Return Visit with Shahla Crawley MD   [facility] ([facility] [place])    [address]  2[address] Floor  [facility] [id number] [phone]              Future tests that were ordered for you today     Open Standing Orders        Priority Remaining Interval Expires Ordered    Red blood cell prepare order unit Routine 99/100 CONDITIONAL (SPECIFY) BLOOD  11/3/2017            Who to contact     If you have questions or need follow up information about today's clinic visit or your schedule please contact [facility] directly at [phone].  Normal or non-critical lab and imaging results will be communicated to you by Jareehart, letter or phone within 4 business days after the clinic has received the results. If you do not hear from us within 7 days, please contact the clinic through Jareehart or phone. If you have a critical or abnormal lab result, we will notify you by phone as soon as possible.  Submit refill requests through [employer]on[employer] or call your pharmacy and they will forward the refill request to us. Please allow 3 business days for your refill to be completed.          Additional Information About Your Visit        [employer] Information     [employer] gives you secure access to your electronic health record. If you see a primary care provider, you can also send messages to your care team and make appointments. If you have questions, please call your primary care clinic.  If you do not have a primary care provider, please call [phone] and they will assist you.        Care EveryWhere ID     This is your Care EveryWhere ID. This could be used by other organizations to access your [facility] medical records  [id number]        Your Vitals Were     Pulse Temperature Pulse Oximetry BMI (Body Mass Index)          91 97  F (36.1  C) (Tympanic) 96% 30.55  kg/m2         Blood Pressure from Last 3 Encounters:   11/06/17 91/50   11/03/17 124/81   11/02/17 113/81    Weight from Last 3 Encounters:   11/06/17 83.3 kg (183 lb 9.6 oz)   11/03/17 82.6 kg (182 lb)   10/31/17 85 kg (187 lb 8 oz)              We Performed the Following     CBC with platelets differential     Comprehensive metabolic panel     Transfuse red blood cell unit          Today's Medication Changes          These changes are accurate as of: 11/6/17  8:24 AM.  If you have any questions, ask your nurse or doctor.               These medicines have changed or have updated prescriptions.        Dose/Directions    calcitRIOL 0.25 MCG capsule   Commonly known as:  ROCALTROL   This may have changed:  additional instructions   Used for:  Postsurgical hypothyroidism, Papillary carcinoma, follicular variant (H), Metastasis to cervical lymph node (H)        TAKE 2 CAPSULES BY MOUTH EVERY MORNING AND TAKE 1 CAPSULE BY MOUTH EVERY NIGHT AT BEDTIME   Quantity:  270 capsule   Refills:  3       diazepam 5 MG tablet   Commonly known as:  VALIUM   This may have changed:    - when to take this  - additional instructions   Used for:  Chest wall pain        Dose:  5 mg   Take 1 tablet (5 mg) by mouth 3 times daily as needed For muscle spasms   Quantity:  90 tablet   Refills:  2       levothyroxine 112 MCG tablet   Commonly known as:  SYNTHROID/LEVOTHROID   This may have changed:    - how much to take  - additional instructions   Used for:  Postsurgical hypothyroidism, Papillary carcinoma, follicular variant (H), Postsurgical hypoparathyroidism (H), Thyroid cancer (H)        Mon-Sat: (2 tabs daily) Sunday: 3 tabs   Quantity:  189 tablet   Refills:  3       methocarbamol 750 MG tablet   Commonly known as:  ROBAXIN   This may have changed:    - when to take this  - additional instructions   Used for:  Myofascial pain        Dose:  750 mg   Take 1 tablet (750 mg) by mouth 4 times daily as needed . Ok to take a 5th Robaxin on  very severe days.   Quantity:  360 tablet   Refills:  1                Primary Care Provider Office Phone # Fax #    Shahla Raul Crawley [phone]       [address] [place] [id number]        Equal Access to Services     ABDIAZIZJENAE SERA : Hadii sacha ku hadmoraimao Soomaali, waaxda luqadaha, qaybta kaalmada adeegyada, waxjoey idiin haydaljitn adeerrol padron laEmilirajiv martin. So [facility] [phone].    ATENCIÓN: Si habla español, tiene a stiles disposición servicios gratuitos de asistencia lingüística. Llame al [phone].    We comply with applicable federal civil rights laws and [place] laws. We do not discriminate on the basis of race, color, national origin, age, disability, sex, sexual orientation, or gender identity.            Thank you!     Thank you for choosing [facility]  for your care. Our goal is always to provide you with excellent care. Hearing back from our patients is one way we can continue to improve our services. Please take a few minutes to complete the written survey that you may receive in the mail after your visit with us. Thank you!             Your Updated Medication List - Protect others around you: Learn how to safely use, store and throw away your medicines at www.disposemymeds.org.          This list is accurate as of: 11/6/17  8:24 AM.  Always use your most recent med list.                   Brand Name Dispense Instructions for use Diagnosis    acyclovir 400 MG tablet    ZOVIRAX    60 tablet    Take 1 tablet (400 mg) by mouth 2 times daily    High grade B-cell lymphoma (H)       allopurinol 300 MG tablet    ZYLOPRIM    30 tablet    Take 1 tablet (300 mg) by mouth daily    High grade B-cell lymphoma (H)       aluminum chloride 20 % external solution    DRYSOL    60 mL    Apply topically At Bedtime    Rash, Intertrigo       AZMACORT IN      Inhale 2 puffs into the lungs as needed        bisacodyl 5 MG EC tablet     30 tablet    Take 3 tablets (15 mg) by mouth  daily as needed for constipation    Constipation, unspecified constipation type       calcitRIOL 0.25 MCG capsule    ROCALTROL    270 capsule    TAKE 2 CAPSULES BY MOUTH EVERY MORNING AND TAKE 1 CAPSULE BY MOUTH EVERY NIGHT AT BEDTIME    Postsurgical hypothyroidism, Papillary carcinoma, follicular variant (H), Metastasis to cervical lymph node (H)       CALCIUM CITRATE +D 315-250 MG-UNIT Tabs per tablet   Generic drug:  calcium citrate-vitamin D     120 tablet    Take 2 tablets by mouth 3 times daily        celecoxib 200 MG capsule    celeBREX    56 capsule    TAKE ONE CAPSULE BY MOUTH TWICE DAILY    Chest wall pain       cetirizine 10 MG tablet    ZYRTEC ALLERGY    30 tablet    Take 1 tablet (10 mg) by mouth 3 times daily On hold for lab test.    High grade B-cell lymphoma (H)       COMPOUND CONTAINING CONTROLLED SUBSTANCE - PHARMACY TO MIX COMPOUNDED MEDICATION    CMPD RX    120 g    Apply small amount to affected area two times daily.    Neoplasm related pain       cromolyn 4 % ophthalmic solution    OPTICROM    10 mL    Place 1 drop into both eyes 4 times daily    Idiopathic mast cell activation syndrome (H)       cyclobenzaprine 10 MG tablet    FLEXERIL    90 tablet    Take 1 tablet (10 mg) by mouth 3 times daily as needed    High grade B-cell lymphoma (H)       dexamethasone 4 MG tablet    DECADRON    5 tablet    Take 8 mg (2 tab) by mouth 11/1 and 11/2, then 4 mg (1 tab) by mouth 11/3.    High grade B-cell lymphoma (H)       diazepam 5 MG tablet    VALIUM    90 tablet    Take 1 tablet (5 mg) by mouth 3 times daily as needed For muscle spasms    Chest wall pain       diclofenac 1 % Gel topical gel    VOLTAREN    100 g    Apply affected area two times daily PRN using enclosed dosing card.    Myofascial pain       diphenhydrAMINE 50 MG capsule    BENADRYL    56 capsule    Take 1 capsule (50 mg) by mouth nightly as needed for itching or sleep    High grade B-cell lymphoma (H)       docusate sodium 100 MG tablet     COLACE    30 tablet    Take 100 mg by mouth daily    Acute constipation       ENOVARX-LIDOCAINE HCL 10 % Crea     60 g    Externally apply 1 g topically 3 times daily as needed    Chest wall pain       EPINEPHrine 0.3 MG/0.3ML injection 2-pack    EPIPEN 2-CARIDAD    0.6 mL    Inject 0.3 mLs (0.3 mg) into the muscle once as needed for anaphylaxis        ferrous sulfate 325 (65 FE) MG tablet    IRON    90 tablet    Take 1 tablet (325 mg) by mouth 3 times daily (with meals)    Status post bariatric surgery       fluconazole 200 MG tablet    DIFLUCAN    30 tablet    Take 1 tablet (200 mg) by mouth daily    High grade B-cell lymphoma (H)       furosemide 20 MG tablet    LASIX    4 tablet    Take 2 tablets (40 mg) by mouth daily for 2 days    High grade B-cell lymphoma (H)       hydrochlorothiazide 12.5 MG capsule    MICROZIDE    90 capsule    Take 1 capsule (12.5 mg) by mouth daily    Hypocalcemia       levofloxacin 250 MG tablet    LEVAQUIN    30 tablet    Take 1 tablet (250 mg) by mouth daily    High grade B-cell lymphoma (H)       levothyroxine 112 MCG tablet    SYNTHROID/LEVOTHROID    189 tablet    Mon-Sat: (2 tabs daily) Sunday: 3 tabs    Postsurgical hypothyroidism, Papillary carcinoma, follicular variant (H), Postsurgical hypoparathyroidism (H), Thyroid cancer (H)       lidocaine visc 2% 2.5mL/5mL & maalox/mylanta w/ simeth 2.5mL/5mL & diphenhydrAMINE 5mg/5mL Susp suspension    [facility]    360 mL    Swish and spit 10 mLs in mouth every 6 hours as needed for mouth sores    Stomatitis and mucositis, High grade B-cell lymphoma (H)       lidocaine-prilocaine cream    EMLA    30 g    Apply topically as needed (port access pain.)    Neoplasm related pain, Chest wall pain       methocarbamol 750 MG tablet    ROBAXIN    360 tablet    Take 1 tablet (750 mg) by mouth 4 times daily as needed . Ok to take a 5th Robaxin on very severe days.    Myofascial pain       mometasone 110 MCG/INH inhaler    ASMANEX 30  METERED DOSES    3 Inhaler    Inhale 1 puff into the lungs daily    Intermittent asthma, uncomplicated       montelukast 10 MG tablet    SINGULAIR    120 tablet    TAKE TWO TABLETS BY MOUTH TWICE DAILY    Idiopathic mast cell activation syndrome (H)       * MS CONTIN PO      Take 60 mg by mouth daily Takes at 8pm        * morphine 30 MG 12 hr tablet    MS CONTIN    120 tablet    Take 1 tablet (30 mg) by mouth every 8 hours . Take an addition pill at night such that your evening dose is 60mg    Neoplasm related pain       NASALCROM 5.2 MG/ACT Aers Inhaler   Generic drug:  cromolyn sodium     1 Bottle    SPRAY ONE SPRAY( 1 ML) IN NOSTRIL DAILY    Mast cell disease, systemic       OMEGA 3 PO      Take 5 mLs by mouth        ondansetron 8 MG ODT tab    ZOFRAN ODT    90 tablet    Take 1 tablet (8 mg) by mouth every 8 hours as needed for nausea or vomiting    Nausea with vomiting       * order for DME     2 Units    Equipment being ordered: compression stockings. 20-30 mm or 30 - 40 mm as patient can tolerate. Physical therapy to determine if they should be above or below the knee.    Venous stasis       * order for DME     2 Device    Equipment being ordered: compression bra    Malignant neoplasm of right female breast, unspecified site of breast       * order for DME     1 Units    Compression stockings, medium grade, please measure and fit. Please dispense a pair.    Peripheral edema       oxyCODONE IR 30 MG tablet    ROXICODONE    180 tablet    Take 1 tablet (30 mg) by mouth every 4 hours as needed for moderate to severe pain    High grade B-cell lymphoma (H)       polyethylene glycol powder    MIRALAX    510 g    Take 17 g (1 capful) by mouth daily    Acute constipation       potassium chloride SA 20 MEQ CR tablet    KLOR-CON    90 tablet    Take 1 tablet (20 mEq) by mouth daily    Hypokalemia       PROBIOTIC DAILY PO      Take 1 capsule by mouth daily Lacto acid bifidobacterium    Breast cancer, unspecified  laterality, Thyroid cancer (H), Chronic arthralgias of knees and hips, unspecified laterality       prochlorperazine 10 MG tablet    COMPAZINE    60 tablet    Take 1 tablet (10 mg) by mouth every 6 hours as needed for nausea or vomiting    High grade B-cell lymphoma (H), Nausea       ranitidine 75 MG tablet    ZANTAC    270 tablet    Take 1 tablet (75 mg) by mouth 3 times daily    Mast cell disease, systemic       senna-docusate 8.6-50 MG per tablet    SENOKOT-S;PERICOLACE    60 tablet    Take 1-2 tablets by mouth 2 times daily as needed for constipation    Acute constipation       SUMAtriptan 50 MG tablet    IMITREX    9 tablet    Take 1 tablet (50 mg) by mouth at onset of headache for migraine (may repeat in 2 hours as needed, max 2 tablets daily)    Migraine without status migrainosus, not intractable, unspecified migraine type       tacrolimus 0.1 % ointment    PROTOPIC    60 g    Apply topically 2 times daily    Rash, Intertrigo       triamcinolone 0.025 % ointment    KENALOG    80 g    Apply topically 2 times daily Mix with 1 lb jar of vaseline or aquaphor    Intertrigo, Rash       TUMS 500 MG chewable tablet   Generic drug:  calcium carbonate     180 chew tab    Take 2 tablets (1,000 mg) by mouth nightly as needed for other (cramps)        * UNABLE TO FIND      3 tablets 3 times daily MEDICATION NAME: calcium D-Glucarate  3 caps contain 180mg of elemental calcium.        * UNABLE TO FIND      2 tablets 3 times daily MEDICATION NAME: Natural D-Hist (on hold during chemo)    Breast cancer, unspecified laterality, Papillary carcinoma, follicular variant (H), Chronic musculoskeletal pain       * UNABLE TO FIND      MEDICATION NAME: Tumeric 3 capsules daily (on hold during chemo)        * UNABLE TO FIND      Take 2 capsules by mouth 3 times daily Muscle Mag. 2 caps contain B1 20mg, B2 20mg, B6 10mg, magesium 20mg, manganese 2mg.        * UNABLE TO FIND      2 tablets 3 times daily MEDICATION NAME: Digestzymes     Thyroid cancer (H), Postsurgical hypothyroidism, Postsurgical hypoparathyroidism (H)       * UNABLE TO FIND      1 tablet daily MEDICATION NAME: Pure Encapsulations    Thyroid cancer (H), Postsurgical hypothyroidism, Postsurgical hypoparathyroidism (H)       VENTOLIN  (90 BASE) MCG/ACT Inhaler   Generic drug:  albuterol     18 g    INHALE 2 PUFFS INTO THE LUNGS EVERY 4 HOURS AS NEEDED FOR SHORTNESS OF BREATH OR DIFFICULT BREATHING OR WHEEZING    Mild intermittent asthma without complication       vitamin D3 2000 UNITS Caps     60 capsule    Take 5,000 Units by mouth daily Takes 2 tabs daily    Thyroid cancer (H), Postsurgical hypothyroidism, Papillary carcinoma, follicular variant (H), Postsurgical hypoparathyroidism (H)       * Notice:  This list has 11 medication(s) that are the same as other medications prescribed for you. Read the directions carefully, and ask your doctor or other care provider to review them with you.

## 2017-11-06 NOTE — PATIENT INSTRUCTIONS
Coping with Neutropenia   What is neutropenia?  Neutropenia means that you have fewer white blood cells than normal.   Your white blood cells protect you from infection. If your white blood cells become too low, your risk of infection increases.   Signs of an infection include:    Any temperature more than 100.4 F (38 C) (taken under the tongue)    Shaking chills    Pain when urinating    Pain when breathing    Cough or sore throat    A red, swollen or painful cut or wound, or fluid coming from a cut or wound.  How is neutropenia treated?  If you already have an infection, your care team may give you medicine to help fight it. If you do not have an infection, you may receive:     Neulasta (pegfilgrastim)    Neupogen (filgrastim)    Other: ________________________  These medicines can help your body make more white blood cells. Your care team will tell you if medicine is right for you. You will need a number of blood tests to see how well your treatment is working.  What else can I do to prevent infection?    Wash your hands often, including before meals and after using the toilet. Hand washing is the best way to prevent infections. Remind visitors to wash their hands as well.    Take a warm shower each day and pat your skin dry.    Rinse your mouth with mild salt water four times a day.    Brush your teeth with a soft toothbrush after meals. Floss gently.    Take steps to prevent cuts or scrapes, which can lead to infection. For example: use an electric razor, be careful with sharp objects, wear gloves when possible and don't go barefoot.    If you have a cut or scrape, wash the area with soap and water, then cover it with a clean bandage until it heals.    Use lotion to prevent cracks in your skin.    Do not cut your cuticles. Use cream removers instead. Do not wear fake fingernails.    Try to prevent constipation (hard stools). Drink plenty of fluids. If you can, eat whole grains and fresh fruits and vegetables.  "Ask your care team if you should take a stool softener (such as Colace).    Wipe yourself from front to back after using the toilet.    Avoid:    People who have a cold or the flu    Young children who have recently received a live vaccine    Large crowds    Fresh plants, flowers, dried rodriguez and dirt.    Do not have surgery or dental work.    Do not have sex until your white blood cells are back to normal. Do not use enemas, suppositories, douches or tampons.    Ask others to clean up after pets.    Always talk to your cancer doctor before receiving shots (immunization) .  When should I call my care team?  Call your care team if:    Your temperature is more than 100.4 F (38 C) (taken under the tongue).    You have chills and are shaking.    You have pain when urinating (using the toilet), or you need to pee more often than normal.    You have itching or unusual drainage from your vagina.    You have pain when breathing, or you are having a hard time breathing.    You have a cough or sore throat.    You have soft white patches on your tongue or the sides of your mouth.    You see redness, swelling or fluid draining from a cut or wound.    You suddenly feel very weak and tired.  Comments:  ____________________________________  _______________________________________  _______________________________________  _______________________________________  _______________________________________  _______________________________________  Food Safety Guidelines  Buying food    Buy foods before the expiration date listed on food labels. (also called the \"use by\" date.)    Check that milk, apple cider, egg and cheese products have been pasteurized. This should be printed on their labels.    Buy fruits and vegetables with unbroken skins.  Preparing food    Check the labels on your foods. Throw away any foods that are past their \"use-by\" dates.    Wash your hands before making food.    Use soap and water to wash the surfaces you use " to prepare food. Dry the surfaces well. Use only clean utensils.    Wash all raw fruits and vegetables.    Keep uncooked foods separate from cooked foods.    Use one cutting board (non-wood) for raw meats, another for fruits and vegetables, and a third for prepared foods. Wash cutting boards often. This will help prevent cross-contamination.    Thaw frozen food in the refrigerator or in cold water. Do not thaw at room temperature.    Cook raw meat well. The temperature inside the meat should stay at the following for at least 15 seconds:    165 F (74 C) or higher for poultry (whole or ground chicken or turkey)    155 F (68 C) or higher for ground meat (beef, pork) or ground fish    145 F (63 C) for whole beef, pork or fish  Meals and snacks    Wash your hands before eating. Always use clean plates, glasses and utensils.    Avoid raw fish, raw meat, uncooked eggs, raw cookie dough and natural cheese. (This includes moldy cheese, such as blue cheese, as well as cheese made with unpasteurized milk).    Do not drink from a can or bottle. Wash the can or bottle before opening it, and then pour the drink into a cup.  Storing leftovers    Throw out leftovers that have been at room temperature longer than two hours.    Throw out leftovers older than two days.    When reheating leftovers, the temperature inside should be at 165 F (74 C) for at least 15 seconds.    Refrigerated foods should be stored at of 40 F (4.5 C) or less.  Dining out    Ask staff to make sure there are no raw ingredients (such as raw eggs or meat) in your food.    Ask that egg dishes be fully cooked. Meat should be medium-well or well-done.    Avoid buffets and salad bars.  For informational purposes only. Not to replace the advice of your health care provider.   Copyright   2006 [place] [employer] [facility]. All rights reserved. [employer] 925201   REV 10/15..  Post Blood Products Discharge Instructions    You have just received a blood product.  During  the next 48 hours, please be aware of the following symptoms of a blood product reaction.  If you should experience any of these symptoms call your physician.    -Temperature 100.0 or greater  -Shaking or chills  -Shortness of breath or wheezing  -Headache  -Persistent non-productive cough  -Hives  -Itching  -Extreme weakness  -Facial swelling or flushing  -Red urine    If you experience any of these symptoms, please call triage at  [phone] (Monday through  Friday 8:00 AM-4:30 PM):     If after hours, weekends or holidays, please call:  [phone] and ask for the doctor on call for Oncology/Hematology or Women's Health service           November 2017 Sunday Monday Tuesday Wednesday Thursday Friday Saturday                  1     2     [facility]    4:00 PM   (15 min.)    [facility]     [facility] INJECTION    4:15 PM   (30 min.)   Nurse,  Oncology Injection   [facility]     US HEAD NECK SOFT TISSUE    5:00 PM   (60 min.)   UCUS3   [facility] US     LAB    6:00 PM   (15 min.)    LAB   [facility] 3     [facility]    4:00 PM   (15 min.)    [facility]   [facility] Draw     [facility] RETURN    4:25 PM   (50 min.)   Jessica Cox PA-C   [facility] 4       5     6     [facility]    6:30 AM   (15 min.)    [facility]     [facility] ONC INFUSION 360    7:00 AM   (360 min.)    ONCOLOGY INFUSION   [facility] 7     8     9     10     [facility]    4:00 PM   (15 min.)    [facility]     [facility] RETURN    4:25 PM   (50 min.)   Jessica Cox PA-C   [facility] 11       12     13     [facility]    4:00 PM   (15 min.)    [facility]     [facility] RETURN ENDOCRINE    4:15 PM   (30 min.)   Avelina Castro MD   [facility]  14     15     16     [facility]    6:30 AM   (15 min.)    [facility] D[facility]   [facility] Lab Draw     [facility] RETURN   12:15 PM   (50 min.)   Jessica Cox PA-C   [facility] 17     18       19     20     21     22     23     24     25       26     27     [facility] RETURN    3:45 PM   (30 min.)   Shahla Crawley MD   [facility] 28 29 30 December 2017 Sunday Monday Tuesday Wednesday Thursday Friday Saturday                            1     2       3     4     5     6     7     8     9       10     11     12     13     14     15     16       17     18     19     20     21     22     23       24     25     26     27     28     29     30       31                                               Recent Results (from the past 24 hour(s))   CBC with platelets differential    Collection Time: 11/06/17  6:46 AM   Result Value Ref Range    WBC 0.1 (LL) 4.0 - 11.0 10e9/L    RBC Count 2.89 (L) 3.8 - 5.2 10e12/L    Hemoglobin 7.5 (L) 11.7 - 15.7 g/dL    Hematocrit 25.0 (L) 35.0 - 47.0 %    MCV 87 78 - 100 fl    MCH 26.0 (L) 26.5 - 33.0 pg    MCHC 30.0 (L) 31.5 - 36.5 g/dL    RDW 18.8 (H) 10.0 - 15.0 %    Platelet Count 111 (L) 150 - 450 10e9/L    Diff Method WBC <0.5, Diff not done    Comprehensive metabolic panel    Collection Time: 11/06/17  6:46 AM   Result Value Ref Range    Sodium 143 133 - 144 mmol/L    Potassium 3.9 3.4 - 5.3 mmol/L    Chloride 105 94 - 109 mmol/L    Carbon Dioxide 32 20 - 32 mmol/L    Anion Gap 6 3 - 14 mmol/L    Glucose 108 (H) 70 - 99 mg/dL    Urea Nitrogen 17 7 - 30 mg/dL    Creatinine 0.73 0.52 - 1.04 mg/dL    GFR Estimate 82 >60 mL/min/1.7m2    GFR Estimate If Black >90 >60 mL/min/1.7m2    Calcium 7.9 (L) 8.5 - 10.1 mg/dL    Bilirubin Total 0.3 0.2 - 1.3 mg/dL    Albumin 2.7 (L) 3.4 - 5.0 g/dL    Protein Total 5.2 (L) 6.8 - 8.8 g/dL    Alkaline Phosphatase 52 40 - 150 U/L    ALT 26 0 - 50 U/L    AST 10 0 - 45  U/L

## 2017-11-07 ENCOUNTER — TELEPHONE (OUTPATIENT)
Dept: ONCOLOGY | Facility: CLINIC | Age: 57
End: 2017-11-07

## 2017-11-07 LAB
DEPRECATED CALCIDIOL+CALCIFEROL SERPL-MC: <53 UG/L (ref 20–75)
VITAMIN D2 SERPL-MCNC: <5 UG/L
VITAMIN D3 SERPL-MCNC: 48 UG/L

## 2017-11-07 NOTE — TELEPHONE ENCOUNTER
Per Dr. Sauceda: increase fluids, thinking a transfusion reaction is unlikely at this point, OK to increase oxycodone to 1 Q 3 hrs, needs to take imitrex for the headache. If her headache worsens or symptoms do not improve she will need to go to the ED.     Called Tisha back with Dr. Sauceda's instructions. She verbalized understanding.

## 2017-11-08 ENCOUNTER — TELEPHONE (OUTPATIENT)
Dept: ONCOLOGY | Facility: CLINIC | Age: 57
End: 2017-11-08

## 2017-11-08 NOTE — PROGRESS NOTES
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 She is currently undergoing treatment with DA-R-EPOCH. Her past medical history is significant for breast cancer in 2011 s/p bilateral mastectomies and BENJIE/BSO up until recently on Tamoxifen, papillary thyroid cancer s/p total thyroidectomy, chronic chest wall pain, and asthma. Briefly, her oncologic history is as follows:    She presented in 8/2017 with dramatically worse chest and back pain. CT 9/1/17 showed lytic lesion right 10th rib extending to right T9-10 neural foramen with vertebral body invasion. There was associated conglomerate of lymphadenopathy around the mid T-spine. She had a CT guided biopsy showing B-cell high grade lymphoma. Pathology showed \"The morphology shows a population of large cells, diffuse lymphoid infiltrate. The cells are atypical with abundant mitotic and apoptotic activity and single cell necrosis. Tumor is CD45 and CD79a positive and focally weakly CD30 positive. The other stains revealed positivity for CD20, BCL6, BCL2 and MUM1, and CD10 and CD21 negative. The CD5 and CD3 highlighted background T-cells.  MYC expression was equivocal with approximately 40% nuclei staining.  Ki-67 proliferative index is greater than 90-95%. The immunohistochemistry is suggestive of non-GCB immunophenotype of diffuse large B-cell lymphoma. The cytogenetics did not show rearrangement of the BCL2 or c-MYC. There is the presence of BCL6 rearrangement in 78% of cells and gains of MYC and BCL2, but no amplifications.\"     Staging LP and BMBx were negative for lymphoma. She started DA-REPOCH 10/6/17. She did well with chemo other than rigors during CIVI. D/c 10/11/17. Given Neulasta 10/12/17. Post cycle 1 complicated by mucositis and worsening of chronic LE peripheral edema. She began cycle 2 on " 10/27/17. Due to a rise in her LD and uric acid levels on that admission day, she underwent a CT scan which showed response to therapy with improvement in her mediastinal lymphadenopathy and right chest wall mass. She comes in today for routine hospital follow up.     Interval History:  She reports that overall since hospital discharge she has been doing okay. She reports feeling tired. She did have nausea and dry heaves last night and has been taking Zofran. She continues to have the feeling that her whole body is shaky which has been present since starting with chemotherapy. She is not interested in seeing a neurologist at this time. She reports headaches that start in the back of her head and go to the front of her head. She did have an episode of blurred vision with driving recently but this has since improved. She does feel safe to continue to drive. She has been having difficulty with constipation and is taking MiraLAX once a day, 2 Senna-S twice a day, and 2 Dulcolax a day. Her last bowel movement was 3 days ago. Her leg swelling is managed with compression stockings, though still present. She otherwise denies concerns.    Current Outpatient Prescriptions   Medication Sig Dispense Refill     prochlorperazine (COMPAZINE) 10 MG tablet Take 1 tablet (10 mg) by mouth every 6 hours as needed for nausea or vomiting 60 tablet 3     ENOVARX-LIDOCAINE HCL 10 % CREA Externally apply 1 g topically 3 times daily as needed 60 g 3     fluconazole (DIFLUCAN) 200 MG tablet Take 1 tablet (200 mg) by mouth daily 30 tablet 0     cetirizine (ZYRTEC ALLERGY) 10 MG tablet Take 1 tablet (10 mg) by mouth 3 times daily On hold for lab test. 30 tablet 0     acyclovir (ZOVIRAX) 400 MG tablet Take 1 tablet (400 mg) by mouth 2 times daily 60 tablet 0     dexamethasone (DECADRON) 4 MG tablet Take 8 mg (2 tab) by mouth 11/1 and 11/2, then 4 mg (1 tab) by mouth 11/3. 5 tablet 0     diclofenac (VOLTAREN) 1 % GEL topical gel Apply affected  area two times daily PRN using enclosed dosing card. 100 g 0     furosemide (LASIX) 20 MG tablet Take 2 tablets (40 mg) by mouth daily for 2 days 4 tablet 0     levofloxacin (LEVAQUIN) 250 MG tablet Take 1 tablet (250 mg) by mouth daily 30 tablet 0     allopurinol (ZYLOPRIM) 300 MG tablet Take 1 tablet (300 mg) by mouth daily 30 tablet 0     ferrous sulfate (IRON) 325 (65 FE) MG tablet Take 1 tablet (325 mg) by mouth 3 times daily (with meals) 90 tablet 0     bisacodyl (DULCOLAX) 5 MG EC tablet Take 3 tablets (15 mg) by mouth daily as needed for constipation 30 tablet 0     docusate sodium (COLACE) 100 MG tablet Take 100 mg by mouth daily 30 tablet 0     polyethylene glycol (MIRALAX) powder Take 17 g (1 capful) by mouth daily 510 g 0     magic mouthwash suspension (diphenhydrAMINE, lidocaine, aluminum-magnesium & simethicone) Swish and spit 10 mLs in mouth every 6 hours as needed for mouth sores 360 mL 1     cyclobenzaprine (FLEXERIL) 10 MG tablet Take 1 tablet (10 mg) by mouth 3 times daily as needed 90 tablet 0     senna-docusate (SENOKOT-S;PERICOLACE) 8.6-50 MG per tablet Take 1-2 tablets by mouth 2 times daily as needed for constipation 60 tablet 0     oxyCODONE (ROXICODONE) 30 MG IR tablet Take 1 tablet (30 mg) by mouth every 4 hours as needed for moderate to severe pain 180 tablet 0     morphine (MS CONTIN) 30 MG 12 hr tablet Take 1 tablet (30 mg) by mouth every 8 hours . Take an addition pill at night such that your evening dose is 60mg 120 tablet 0     UNABLE TO FIND Take 2 capsules by mouth 3 times daily Muscle Mag. 2 caps contain B1 20mg, B2 20mg, B6 10mg, magesium 20mg, manganese 2mg.       Morphine Sulfate (MS CONTIN PO) Take 60 mg by mouth daily Takes at 8pm       lidocaine-prilocaine (EMLA) cream Apply topically as needed (port access pain.) 30 g 1     SUMAtriptan (IMITREX) 50 MG tablet Take 1 tablet (50 mg) by mouth at onset of headache for migraine (may repeat in 2 hours as needed, max 2 tablets  daily) 9 tablet 3     calcitRIOL (ROCALTROL) 0.25 MCG capsule TAKE 2 CAPSULES BY MOUTH EVERY MORNING AND TAKE 1 CAPSULE BY MOUTH EVERY NIGHT AT BEDTIME (Patient taking differently: TAKE 2 CAPSULES BY MOUTH EVERY MORNING AND TAKE 1 CAPSULE BY MOUTH at noon) 270 capsule 3     celecoxib (CELEBREX) 200 MG capsule TAKE ONE CAPSULE BY MOUTH TWICE DAILY  56 capsule 0     order for DME Compression stockings, medium grade, please measure and fit. Please dispense a pair. 1 Units 0     methocarbamol (ROBAXIN) 750 MG tablet Take 1 tablet (750 mg) by mouth 4 times daily as needed . Ok to take a 5th Robaxin on very severe days. (Patient taking differently: Take 750 mg by mouth 4 times daily . Ok to take a 5th Robaxin on very severe days.) 360 tablet 1     ondansetron (ZOFRAN-ODT) 8 MG ODT tab Take 1 tablet (8 mg) by mouth every 8 hours as needed for nausea or vomiting 90 tablet 3     diazepam (VALIUM) 5 MG tablet Take 1 tablet (5 mg) by mouth 3 times daily as needed For muscle spasms (Patient taking differently: Take 5 mg by mouth 3 times daily For muscle spasms) 90 tablet 2     levothyroxine (SYNTHROID/LEVOTHROID) 112 MCG tablet Mon-Sat: (2 tabs daily) Sunday: 3 tabs (Patient taking differently: 112 mcg Mon-Sat: (2 tabs daily) Sunday: 3 tabs) 189 tablet 3     hydrochlorothiazide (MICROZIDE) 12.5 MG capsule Take 1 capsule (12.5 mg) by mouth daily 90 capsule 2     montelukast (SINGULAIR) 10 MG tablet TAKE TWO TABLETS BY MOUTH TWICE DAILY 120 tablet 11     EPINEPHrine (EPIPEN 2-CARIDAD) 0.3 MG/0.3ML injection Inject 0.3 mLs (0.3 mg) into the muscle once as needed for anaphylaxis 0.6 mL 3     potassium chloride SA (POTASSIUM CHLORIDE) 20 MEQ CR tablet Take 1 tablet (20 mEq) by mouth daily 90 tablet 3     cromolyn (OPTICROM) 4 % ophthalmic solution Place 1 drop into both eyes 4 times daily 10 mL 5     NASALCROM 5.2 MG/ACT AERS Inhaler SPRAY ONE SPRAY( 1 ML) IN NOSTRIL DAILY 1 Bottle 6     Cholecalciferol (VITAMIN D3) 2000 UNITS CAPS Take  5,000 Units by mouth daily Takes 2 tabs daily 60 capsule 4     order for DME Equipment being ordered: compression stockings. 20-30 mm or 30 - 40 mm as patient can tolerate. Physical therapy to determine if they should be above or below the knee. 2 Units 4     ranitidine (ZANTAC) 75 MG tablet Take 1 tablet (75 mg) by mouth 3 times daily 270 tablet 3     calcium citrate-vitamin D (CALCIUM CITRATE +D) 315-250 MG-UNIT TABS Take 2 tablets by mouth 3 times daily 120 tablet      UNABLE TO FIND 3 tablets 3 times daily MEDICATION NAME: calcium D-Glucarate   3 caps contain 180mg of elemental calcium.       UNABLE TO FIND 2 tablets 3 times daily MEDICATION NAME: Digestzymes        UNABLE TO FIND 1 tablet daily MEDICATION NAME: Pure Encapsulations       Probiotic Product (PROBIOTIC DAILY PO) Take 1 capsule by mouth daily Lacto acid bifidobacterium       diphenhydrAMINE (BENADRYL) 50 MG capsule Take 1 capsule (50 mg) by mouth nightly as needed for itching or sleep (Patient not taking: Reported on 11/3/2017) 56 capsule      COMPOUND CONTAINING CONTROLLED SUBSTANCE (CMPD RX) - PHARMACY TO MIX COMPOUNDED MEDICATION Apply small amount to affected area two times daily. (Patient not taking: Reported on 11/3/2017) 120 g 6     VENTOLIN  (90 BASE) MCG/ACT Inhaler INHALE 2 PUFFS INTO THE LUNGS EVERY 4 HOURS AS NEEDED FOR SHORTNESS OF BREATH OR DIFFICULT BREATHING OR WHEEZING (Patient not taking: Reported on 11/3/2017) 18 g 3     order for DME Equipment being ordered: compression bra (Patient not taking: Reported on 11/3/2017) 2 Device 1     aluminum chloride (DRYSOL) 20 % external solution Apply topically At Bedtime (Patient not taking: Reported on 11/3/2017) 60 mL 3     tacrolimus (PROTOPIC) 0.1 % ointment Apply topically 2 times daily (Patient not taking: Reported on 11/3/2017) 60 g 3     triamcinolone (KENALOG) 0.025 % ointment Apply topically 2 times daily Mix with 1 lb jar of vaseline or aquaphor (Patient not taking: Reported  "on 11/3/2017) 80 g 3     UNABLE TO FIND MEDICATION NAME: Tumeric 3 capsules daily (on hold during chemo)       mometasone (ASMANEX 30 METERED DOSES) 110 MCG/INH inhaler Inhale 1 puff into the lungs daily (Patient not taking: Reported on 11/3/2017) 3 Inhaler 3     UNABLE TO FIND 2 tablets 3 times daily MEDICATION NAME: Natural D-Hist (on hold during chemo)       calcium carbonate (TUMS) 500 MG chewable tablet Take 2 tablets (1,000 mg) by mouth nightly as needed for other (cramps) 180 chew tab 3     Triamcinolone Acetonide (AZMACORT IN) Inhale 2 puffs into the lungs as needed       Omega-3 Fatty Acids (OMEGA 3 PO) Take 5 mLs by mouth       Physical Examination:  /81 (BP Location: Left arm, Patient Position: Sitting, Cuff Size: Adult Regular)  Pulse 129  Temp 98.1  F (36.7  C) (Oral)  Resp 16  Ht 1.651 m (5' 5\")  Wt 82.6 kg (182 lb)  SpO2 99%  BMI 30.29 kg/m2  Wt Readings from Last 10 Encounters:   11/06/17 83.3 kg (183 lb 9.6 oz)   11/03/17 82.6 kg (182 lb)   10/31/17 85 kg (187 lb 8 oz)   10/27/17 85.1 kg (187 lb 9.8 oz)   10/23/17 85.9 kg (189 lb 4.8 oz)   10/23/17 85.9 kg (189 lb 4.8 oz)   10/21/17 87.1 kg (192 lb)   10/19/17 87.1 kg (192 lb)   10/16/17 88.2 kg (194 lb 8 oz)   10/11/17 87.9 kg (193 lb 12.6 oz)   Constitutional: Well-appearing female in no acute distress.  Eyes: EOMI, PERRL. No scleral icterus.  ENT: Oral mucosa is moist without lesions or thrush.   Lymphatic: Neck is supple without cervical or supraclavicular lymphadenopathy.   Cardiovascular: Regular rate and rhythm.  Respiratory: Clear to auscultation bilaterally. No wheezes or crackles.  Gastrointestinal: Bowel sounds present. Abdomen soft,mildly tender in the RUQ, remainder nontender. No palpable hepatosplenomegaly or masses.   Neurologic: Cranial nerves II through XII are intact.   Skin: No rashes, petechiae, or bruising noted on exposed skin.  Extremities: 1+ lower extremity edema bilaterally, compression stockings in " "place.    Laboratory Data:   11/3/2017 16:28   Sodium 139   Potassium 3.7   Chloride 98   Carbon Dioxide 33 (H)   Urea Nitrogen 26   Creatinine 0.82   GFR Estimate 71   GFR Estimate If Black 86   Calcium 8.6   Anion Gap 8   Albumin 3.3 (L)   Protein Total 6.2 (L)   Bilirubin Total 0.4   Alkaline Phosphatase 75   ALT 37   AST 14   Glucose 129 (H)   WBC 19.0 (H)   Hemoglobin 9.7 (L)   Hematocrit 31.9 (L)   Platelet Count 259   RBC Count 3.72 (L)   MCV 86   MCH 26.1 (L)   MCHC 30.4 (L)   RDW 19.6 (H)   Diff Method Manual Differential   % Neutrophils 97.4   % Lymphocytes 2.6   % Monocytes 0.0   % Eosinophils 0.0   % Basophils 0.0   Absolute Neutrophil 18.5 (H)   Absolute Lymphocytes 0.5 (L)   Absolute Monocytes 0.0   Absolute Eosinophils 0.0   Absolute Basophils 0.0   Anisocytosis Moderate   Hyper Segmented PMNs Present     Assessment and Plan:    1. DLBCL, \"double-hit\"-like, currently on treatment with DA-R-EPOCH  Cycle 1 overall tolerated well other than mucositis and mild infusion reaction (rigors) to continuous infusion of chemotherapy. Interval CT CAP after 1 cycle due to elevated LD and uric acid showed response to treatment. She is thus far tolerating cycle 2 fairly well with some fatigue, nausea, and constipation. We discussed Compazine for nausea. She will return twice weekly for labs with possible transfusions. She will see me again in 1 week. She will be admitted for cycle 3 on 11/16. I will check with Dr. Sauceda regarding the timing of her next PET/CT, after 3 or 4 cycles of chemotherapy.     2. Sensation of body shaking.  Present since chemotherapy. Possibly a side effect. She declines a referral to neurology at this time. If worsens, will need to reconsider referral.     3. Heme  No transfusion needs today. Continue to monitor with twice weekly labs and transfuse if Hgb<8 and Plt<10k. Neutrophilia secondary to Neulasta, given on 11/2 with no evidence of infection on H&P today.    4. ID  Continue ppx ACV " 400mg BID, levaquin 250mg daily, and fluconazole 200mg daily.    5. Lower extremity edema with pain  Chronic condition likely exacerbated by steroids and excessive fluids. US on 10/21 negative for DVT bilaterally. Continue Lasix 20 mg x 3 more days. Continue pain medications as prescribed by palliative care.    6. History of stage 1, ER/AK+, HER2- breast cancer s/p bilateral mastectomies and BENJIE/BSO  Follows with Dr. Crawley. Oncotype recurrence score 11%. Was initially on Arimidex ([id number]) but changed to Tamoxifen due to arthralgias. Tamoxifen held since 10/5 and continue to hold with chemotherapy. F/u Dr. Crawley 11/27/17    7. History of papillary thyroid cancer, s/p total thyroidectomy  Follows with Dr. Castro. Has post surgical hypothyroidism. Continue levothyroxine and calcitriol as prescribed. Neck u/s on 11/2 shows no evidence of disease.    8. History of Archael en Y gastric bypass  Continue supplements (calcium citrate-vitamin D, vitamin D3, magnesium citrate). Also has iron deficiency anemia likely from poor absorption and per Dr. Sauceda, she should continue 325mg PO ferrous sulfate TID. She should take stool softeners if issues with iron induced constipation.    9. Chronic chest wall pain s/p mastectomies  Follows with Dr. Benitez. Palliative care to continue to prescribe pain medications. Taking MS Contin, Oxycodone for breakthrough pain, and celebrex.     10. Constipation.  Recommend increasing Dulcolax temporarily until resumes regular bowel movements. Continue with MiraLax and Senna-S. If no results in 1-2 days, recommend considering a dose of magnesium citrate.        Jessica Cox PA-C  [facility]  [address] [place] [address] [phone]

## 2017-11-08 NOTE — TELEPHONE ENCOUNTER
"Oncology Distress Screening Follow-up  Clinical Social Work  [facility]    Identified Concern and Score From Distress Screening: How concerned are you about work and home life issues that may be affected by your cancer? 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    Intervention: Call was placed to the pt regarding her questions.  The pt shared that she has been having a hard time managing the stress of work along with her . The pt shared that her  did not believe that she had cancer and that was hard for her, but he is now understanding of her diagnosis and supportive. The pt also discussed that she is finding it hard to manage work and her chemotherapy. The pt does not have long or short term disability options at this time because she has not been at her job for 1 year, benefits will start in April of 2018. 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 We discussed working with her employer regarding accomodation that she could have. The pt shared that her employer has been very supportive to her. The pt is interested in more information about support groups and financial support, she would prefer to speak in clinic and will be in next on 11/10/17. Message sent to Yasmin ADKINSto schedule a meeting with the pt.     Education Provided: support resources, coping skills    Follow-up Required: AN will meet with the pt on 11/10/17      JW William, [facility]  Phone [phone]  Pager [phone]      "

## 2017-11-10 ENCOUNTER — INFUSION THERAPY VISIT (OUTPATIENT)
Dept: ONCOLOGY | Facility: CLINIC | Age: 57
End: 2017-11-10
Attending: INTERNAL MEDICINE
Payer: COMMERCIAL

## 2017-11-10 ENCOUNTER — ONCOLOGY VISIT (OUTPATIENT)
Dept: ONCOLOGY | Facility: CLINIC | Age: 57
End: 2017-11-10
Attending: PHYSICIAN ASSISTANT
Payer: COMMERCIAL

## 2017-11-10 ENCOUNTER — DOCUMENTATION ONLY (OUTPATIENT)
Dept: ONCOLOGY | Facility: CLINIC | Age: 57
End: 2017-11-10

## 2017-11-10 ENCOUNTER — APPOINTMENT (OUTPATIENT)
Dept: LAB | Facility: CLINIC | Age: 57
End: 2017-11-10
Attending: INTERNAL MEDICINE
Payer: COMMERCIAL

## 2017-11-10 ENCOUNTER — TELEPHONE (OUTPATIENT)
Dept: ONCOLOGY | Facility: CLINIC | Age: 57
End: 2017-11-10

## 2017-11-10 VITALS
BODY MASS INDEX: 31.12 KG/M2 | SYSTOLIC BLOOD PRESSURE: 102 MMHG | TEMPERATURE: 98 F | RESPIRATION RATE: 16 BRPM | WEIGHT: 187 LBS | DIASTOLIC BLOOD PRESSURE: 62 MMHG | OXYGEN SATURATION: 99 % | HEART RATE: 111 BPM

## 2017-11-10 DIAGNOSIS — C83.33 DIFFUSE LARGE B-CELL LYMPHOMA OF INTRA-ABDOMINAL LYMPH NODES (H): ICD-10-CM

## 2017-11-10 DIAGNOSIS — C85.10 HIGH GRADE B-CELL LYMPHOMA (H): Primary | ICD-10-CM

## 2017-11-10 DIAGNOSIS — C50.919 BREAST CANCER (H): ICD-10-CM

## 2017-11-10 LAB
ABO + RH BLD: NORMAL
ABO + RH BLD: NORMAL
ALBUMIN SERPL-MCNC: 2.6 G/DL (ref 3.4–5)
ALP SERPL-CCNC: 82 U/L (ref 40–150)
ALT SERPL W P-5'-P-CCNC: 30 U/L (ref 0–50)
ANION GAP SERPL CALCULATED.3IONS-SCNC: 7 MMOL/L (ref 3–14)
AST SERPL W P-5'-P-CCNC: 26 U/L (ref 0–45)
BASOPHILS # BLD AUTO: 0.2 10E9/L (ref 0–0.2)
BASOPHILS NFR BLD AUTO: 1.8 %
BILIRUB SERPL-MCNC: 0.3 MG/DL (ref 0.2–1.3)
BLD GP AB SCN SERPL QL: NORMAL
BLD PROD TYP BPU: NORMAL
BLD UNIT ID BPU: 0
BLOOD BANK CMNT PATIENT-IMP: NORMAL
BLOOD PRODUCT CODE: NORMAL
BPU ID: NORMAL
BUN SERPL-MCNC: 16 MG/DL (ref 7–30)
CALCIUM SERPL-MCNC: 8 MG/DL (ref 8.5–10.1)
CHLORIDE SERPL-SCNC: 108 MMOL/L (ref 94–109)
CO2 SERPL-SCNC: 28 MMOL/L (ref 20–32)
CREAT SERPL-MCNC: 0.9 MG/DL (ref 0.52–1.04)
DIFFERENTIAL METHOD BLD: ABNORMAL
EOSINOPHIL # BLD AUTO: 0 10E9/L (ref 0–0.7)
EOSINOPHIL NFR BLD AUTO: 0 %
ERYTHROCYTE [DISTWIDTH] IN BLOOD BY AUTOMATED COUNT: 20 % (ref 10–15)
GFR SERPL CREATININE-BSD FRML MDRD: 64 ML/MIN/1.7M2
GLUCOSE SERPL-MCNC: 107 MG/DL (ref 70–99)
HCT VFR BLD AUTO: 29.9 % (ref 35–47)
HGB BLD-MCNC: 9 G/DL (ref 11.7–15.7)
LYMPHOCYTES # BLD AUTO: 0.4 10E9/L (ref 0.8–5.3)
LYMPHOCYTES NFR BLD AUTO: 4.4 %
MCH RBC QN AUTO: 26.3 PG (ref 26.5–33)
MCHC RBC AUTO-ENTMCNC: 30.1 G/DL (ref 31.5–36.5)
MCV RBC AUTO: 87 FL (ref 78–100)
MICROCYTES BLD QL SMEAR: PRESENT
MONOCYTES # BLD AUTO: 0.5 10E9/L (ref 0–1.3)
MONOCYTES NFR BLD AUTO: 5.3 %
MYELOCYTES # BLD: 0.2 10E9/L
MYELOCYTES NFR BLD MANUAL: 1.8 %
NEUTROPHILS # BLD AUTO: 7.2 10E9/L (ref 1.6–8.3)
NEUTROPHILS NFR BLD AUTO: 81.4 %
NRBC # BLD AUTO: 0.2 10*3/UL
NRBC BLD AUTO-RTO: 3 /100
NUM BPU REQUESTED: 2
OVALOCYTES BLD QL SMEAR: SLIGHT
PLATELET # BLD AUTO: 215 10E9/L (ref 150–450)
POIKILOCYTOSIS BLD QL SMEAR: SLIGHT
POLYCHROMASIA BLD QL SMEAR: ABNORMAL
POTASSIUM SERPL-SCNC: 3.9 MMOL/L (ref 3.4–5.3)
PROMYELOCYTES # BLD MANUAL: 0.5 10E9/L
PROMYELOCYTES NFR BLD MANUAL: 5.3 %
PROT SERPL-MCNC: 6 G/DL (ref 6.8–8.8)
RBC # BLD AUTO: 3.42 10E12/L (ref 3.8–5.2)
SODIUM SERPL-SCNC: 143 MMOL/L (ref 133–144)
SPECIMEN EXP DATE BLD: NORMAL
TRANSFUSION STATUS PATIENT QL: NORMAL
WBC # BLD AUTO: 8.9 10E9/L (ref 4–11)

## 2017-11-10 PROCEDURE — 25000128 H RX IP 250 OP 636: Mod: ZF | Performed by: PHYSICIAN ASSISTANT

## 2017-11-10 PROCEDURE — 86901 BLOOD TYPING SEROLOGIC RH(D): CPT

## 2017-11-10 PROCEDURE — 85025 COMPLETE CBC W/AUTO DIFF WBC: CPT | Performed by: PHYSICIAN ASSISTANT

## 2017-11-10 PROCEDURE — 25000128 H RX IP 250 OP 636: Mod: ZF | Performed by: INTERNAL MEDICINE

## 2017-11-10 PROCEDURE — 86923 COMPATIBILITY TEST ELECTRIC: CPT

## 2017-11-10 PROCEDURE — 99215 OFFICE O/P EST HI 40 MIN: CPT | Mod: ZP | Performed by: PHYSICIAN ASSISTANT

## 2017-11-10 PROCEDURE — 96360 HYDRATION IV INFUSION INIT: CPT

## 2017-11-10 PROCEDURE — 80053 COMPREHEN METABOLIC PANEL: CPT | Performed by: PHYSICIAN ASSISTANT

## 2017-11-10 PROCEDURE — 86900 BLOOD TYPING SEROLOGIC ABO: CPT

## 2017-11-10 PROCEDURE — 86850 RBC ANTIBODY SCREEN: CPT

## 2017-11-10 RX ORDER — HEPARIN SODIUM (PORCINE) LOCK FLUSH IV SOLN 100 UNIT/ML 100 UNIT/ML
5 SOLUTION INTRAVENOUS ONCE
Status: COMPLETED | OUTPATIENT
Start: 2017-11-10 | End: 2017-11-10

## 2017-11-10 RX ORDER — HEPARIN SODIUM (PORCINE) LOCK FLUSH IV SOLN 100 UNIT/ML 100 UNIT/ML
500 SOLUTION INTRAVENOUS EVERY 8 HOURS
Status: DISCONTINUED | OUTPATIENT
Start: 2017-11-10 | End: 2017-11-10 | Stop reason: HOSPADM

## 2017-11-10 RX ADMIN — SODIUM CHLORIDE, PRESERVATIVE FREE 500 UNITS: 5 INJECTION INTRAVENOUS at 12:13

## 2017-11-10 RX ADMIN — SODIUM CHLORIDE, PRESERVATIVE FREE 5 ML: 5 INJECTION INTRAVENOUS at 09:46

## 2017-11-10 RX ADMIN — SODIUM CHLORIDE 1000 ML: 9 INJECTION, SOLUTION INTRAVENOUS at 10:52

## 2017-11-10 NOTE — TELEPHONE ENCOUNTER
Social Work Note: Telephone Call  Oncology Clinic    Data/Intervention:  Patient Name:  Tisha Arias  /Age:  1960 (57 year old)    Call From:  JW Hagan  Reason for Call:  Distress screening f/u     Was unable to meet with pt in clinic today, so placed call this afternoon to follow up on distress screening. Reached voicemail, so left message with call back number.     Assessment:  NA     Plan:  SW available to support and assist with ongoing social service needs.     JW Will, SW  [facility]  -  Coverage for  Mari Ashraf  Pager: [phone]  [email or web address]     NO LETTER

## 2017-11-10 NOTE — PROGRESS NOTES
Stopped by to see pt during infusion appt today, as requested by pt during  [place] distress screening follow up call, but she was asleep and did not awaken to  arrival. Was unable to connect with her again before she left, so will follow up by phone. It appears she is in clinic again next week, as well.     JW Will, [facility]  -  Coverage for  Mari Ashraf  Pager: [phone]  [email or web address]     NO LETTER

## 2017-11-10 NOTE — Clinical Note
"11/10/2017       RE: Tisha Arias  [address] [place] [address]     Dear Colleague,    Thank you for referring your patient, Tisha Arias, to the [facility]. Please see a copy of my visit note below.    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 She is currently undergoing treatment with DA-R-EPOCH. Her past medical history is significant for breast cancer in 2011 s/p bilateral mastectomies and BENJIE/BSO up until recently on Tamoxifen, papillary thyroid cancer s/p total thyroidectomy, chronic chest wall pain, and asthma. Briefly, her oncologic history is as follows:    She presented in 8/2017 with dramatically worse chest and back pain. CT 9/1/17 showed lytic lesion right 10th rib extending to right T9-10 neural foramen with vertebral body invasion. There was associated conglomerate of lymphadenopathy around the mid T-spine. She had a CT guided biopsy showing B-cell high grade lymphoma. Pathology showed \"The morphology shows a population of large cells, diffuse lymphoid infiltrate. The cells are atypical with abundant mitotic and apoptotic activity and single cell necrosis. Tumor is CD45 and CD79a positive and focally weakly CD30 positive. The other stains revealed positivity for CD20, BCL6, BCL2 and MUM1, and CD10 and CD21 negative. The CD5 and CD3 highlighted background T-cells.  MYC expression was equivocal with approximately 40% nuclei staining.  Ki-67 proliferative index is greater than 90-95%. The immunohistochemistry is suggestive of non-GCB immunophenotype of diffuse large B-cell lymphoma. The cytogenetics did not show rearrangement of the BCL2 or c-MYC. There is the presence of BCL6 rearrangement in 78% of cells and gains of MYC and BCL2, but no amplifications.\"     Staging LP and BMBx were negative " for lymphoma. She started DA-REPOCH 10/6/17. She did well with chemo other than rigors during CIVI. D/c 10/11/17. Given Neulasta 10/12/17. Post cycle 1 complicated by mucositis and worsening of chronic LE peripheral edema. She began cycle 2 on 10/27/17. Due to a rise in her LD and uric acid levels on that admission day, she underwent a CT scan which showed response to therapy with improvement in her mediastinal lymphadenopathy and right chest wall mass. She comes in today for routine follow up.     Interval History:  Patient reports that she has felt more tired this week. She has also noticed some dizziness with standing. She is drinking at least 32 ounces of fluid a day. She does have some abdominal pain with eating. She reports alternating with constipation and diarrhea. She is currently more constipated. She is taking 2 Colace a day and MiraLAX once a day. She has had headaches this week. She denies any fevers. She reports one day with more significant central chest pain that has since improved. For her headaches she found relief from Imitrex and Tylenol. She continues to feel general shakiness of her body but seems worse when she is active. She feels this is chemotherapy related. She continues on 20 mg of Lasix a day and her leg swelling remains unchanged. She continues to use compression stockings. She has noticed an area of dark her skin on her right leg and wonders about this. She denies other concerns.     Current Outpatient Prescriptions   Medication Sig Dispense Refill     prochlorperazine (COMPAZINE) 10 MG tablet Take 1 tablet (10 mg) by mouth every 6 hours as needed for nausea or vomiting 60 tablet 3     ENOVARX-LIDOCAINE HCL 10 % CREA Externally apply 1 g topically 3 times daily as needed 60 g 3     fluconazole (DIFLUCAN) 200 MG tablet Take 1 tablet (200 mg) by mouth daily 30 tablet 0     cetirizine (ZYRTEC ALLERGY) 10 MG tablet Take 1 tablet (10 mg) by mouth 3 times daily On hold for lab test. 30  tablet 0     acyclovir (ZOVIRAX) 400 MG tablet Take 1 tablet (400 mg) by mouth 2 times daily 60 tablet 0     dexamethasone (DECADRON) 4 MG tablet Take 8 mg (2 tab) by mouth 11/1 and 11/2, then 4 mg (1 tab) by mouth 11/3. 5 tablet 0     diclofenac (VOLTAREN) 1 % GEL topical gel Apply affected area two times daily PRN using enclosed dosing card. 100 g 0     furosemide (LASIX) 20 MG tablet Take 2 tablets (40 mg) by mouth daily for 2 days 4 tablet 0     levofloxacin (LEVAQUIN) 250 MG tablet Take 1 tablet (250 mg) by mouth daily 30 tablet 0     allopurinol (ZYLOPRIM) 300 MG tablet Take 1 tablet (300 mg) by mouth daily 30 tablet 0     ferrous sulfate (IRON) 325 (65 FE) MG tablet Take 1 tablet (325 mg) by mouth 3 times daily (with meals) 90 tablet 0     bisacodyl (DULCOLAX) 5 MG EC tablet Take 3 tablets (15 mg) by mouth daily as needed for constipation 30 tablet 0     docusate sodium (COLACE) 100 MG tablet Take 100 mg by mouth daily 30 tablet 0     polyethylene glycol (MIRALAX) powder Take 17 g (1 capful) by mouth daily 510 g 0     magic mouthwash suspension (diphenhydrAMINE, lidocaine, aluminum-magnesium & simethicone) Swish and spit 10 mLs in mouth every 6 hours as needed for mouth sores 360 mL 1     cyclobenzaprine (FLEXERIL) 10 MG tablet Take 1 tablet (10 mg) by mouth 3 times daily as needed 90 tablet 0     senna-docusate (SENOKOT-S;PERICOLACE) 8.6-50 MG per tablet Take 1-2 tablets by mouth 2 times daily as needed for constipation 60 tablet 0     oxyCODONE (ROXICODONE) 30 MG IR tablet Take 1 tablet (30 mg) by mouth every 4 hours as needed for moderate to severe pain 180 tablet 0     morphine (MS CONTIN) 30 MG 12 hr tablet Take 1 tablet (30 mg) by mouth every 8 hours . Take an addition pill at night such that your evening dose is 60mg 120 tablet 0     UNABLE TO FIND Take 2 capsules by mouth 3 times daily Muscle Mag. 2 caps contain B1 20mg, B2 20mg, B6 10mg, magesium 20mg, manganese 2mg.       Morphine Sulfate (MS CONTIN  PO) Take 60 mg by mouth daily Takes at 8pm       lidocaine-prilocaine (EMLA) cream Apply topically as needed (port access pain.) 30 g 1     SUMAtriptan (IMITREX) 50 MG tablet Take 1 tablet (50 mg) by mouth at onset of headache for migraine (may repeat in 2 hours as needed, max 2 tablets daily) 9 tablet 3     calcitRIOL (ROCALTROL) 0.25 MCG capsule TAKE 2 CAPSULES BY MOUTH EVERY MORNING AND TAKE 1 CAPSULE BY MOUTH EVERY NIGHT AT BEDTIME (Patient taking differently: TAKE 2 CAPSULES BY MOUTH EVERY MORNING AND TAKE 1 CAPSULE BY MOUTH at noon) 270 capsule 3     celecoxib (CELEBREX) 200 MG capsule TAKE ONE CAPSULE BY MOUTH TWICE DAILY  56 capsule 0     order for DME Compression stockings, medium grade, please measure and fit. Please dispense a pair. 1 Units 0     diphenhydrAMINE (BENADRYL) 50 MG capsule Take 1 capsule (50 mg) by mouth nightly as needed for itching or sleep (Patient not taking: Reported on 11/3/2017) 56 capsule      methocarbamol (ROBAXIN) 750 MG tablet Take 1 tablet (750 mg) by mouth 4 times daily as needed . Ok to take a 5th Robaxin on very severe days. (Patient taking differently: Take 750 mg by mouth 4 times daily . Ok to take a 5th Robaxin on very severe days.) 360 tablet 1     ondansetron (ZOFRAN-ODT) 8 MG ODT tab Take 1 tablet (8 mg) by mouth every 8 hours as needed for nausea or vomiting 90 tablet 3     COMPOUND CONTAINING CONTROLLED SUBSTANCE (CMPD RX) - PHARMACY TO MIX COMPOUNDED MEDICATION Apply small amount to affected area two times daily. (Patient not taking: Reported on 11/3/2017) 120 g 6     diazepam (VALIUM) 5 MG tablet Take 1 tablet (5 mg) by mouth 3 times daily as needed For muscle spasms (Patient taking differently: Take 5 mg by mouth 3 times daily For muscle spasms) 90 tablet 2     levothyroxine (SYNTHROID/LEVOTHROID) 112 MCG tablet Mon-Sat: (2 tabs daily) Sunday: 3 tabs (Patient taking differently: 112 mcg Mon-Sat: (2 tabs daily) Sunday: 3 tabs) 189 tablet 3     hydrochlorothiazide  (MICROZIDE) 12.5 MG capsule Take 1 capsule (12.5 mg) by mouth daily 90 capsule 2     montelukast (SINGULAIR) 10 MG tablet TAKE TWO TABLETS BY MOUTH TWICE DAILY 120 tablet 11     EPINEPHrine (EPIPEN 2-CARIDAD) 0.3 MG/0.3ML injection Inject 0.3 mLs (0.3 mg) into the muscle once as needed for anaphylaxis 0.6 mL 3     potassium chloride SA (POTASSIUM CHLORIDE) 20 MEQ CR tablet Take 1 tablet (20 mEq) by mouth daily 90 tablet 3     VENTOLIN  (90 BASE) MCG/ACT Inhaler INHALE 2 PUFFS INTO THE LUNGS EVERY 4 HOURS AS NEEDED FOR SHORTNESS OF BREATH OR DIFFICULT BREATHING OR WHEEZING (Patient not taking: Reported on 11/3/2017) 18 g 3     cromolyn (OPTICROM) 4 % ophthalmic solution Place 1 drop into both eyes 4 times daily 10 mL 5     NASALCROM 5.2 MG/ACT AERS Inhaler SPRAY ONE SPRAY( 1 ML) IN NOSTRIL DAILY 1 Bottle 6     Cholecalciferol (VITAMIN D3) 2000 UNITS CAPS Take 5,000 Units by mouth daily Takes 2 tabs daily 60 capsule 4     order for DME Equipment being ordered: compression stockings. 20-30 mm or 30 - 40 mm as patient can tolerate. Physical therapy to determine if they should be above or below the knee. 2 Units 4     order for DME Equipment being ordered: compression bra (Patient not taking: Reported on 11/3/2017) 2 Device 1     ranitidine (ZANTAC) 75 MG tablet Take 1 tablet (75 mg) by mouth 3 times daily 270 tablet 3     aluminum chloride (DRYSOL) 20 % external solution Apply topically At Bedtime (Patient not taking: Reported on 11/3/2017) 60 mL 3     tacrolimus (PROTOPIC) 0.1 % ointment Apply topically 2 times daily (Patient not taking: Reported on 11/3/2017) 60 g 3     triamcinolone (KENALOG) 0.025 % ointment Apply topically 2 times daily Mix with 1 lb jar of vaseline or aquaphor (Patient not taking: Reported on 11/3/2017) 80 g 3     UNABLE TO FIND MEDICATION NAME: Tumeric 3 capsules daily (on hold during chemo)       mometasone (ASMANEX 30 METERED DOSES) 110 MCG/INH inhaler Inhale 1 puff into the lungs daily  (Patient not taking: Reported on 11/3/2017) 3 Inhaler 3     UNABLE TO FIND 2 tablets 3 times daily MEDICATION NAME: Natural D-Hist (on hold during chemo)       calcium citrate-vitamin D (CALCIUM CITRATE +D) 315-250 MG-UNIT TABS Take 2 tablets by mouth 3 times daily 120 tablet      calcium carbonate (TUMS) 500 MG chewable tablet Take 2 tablets (1,000 mg) by mouth nightly as needed for other (cramps) 180 chew tab 3     UNABLE TO FIND 3 tablets 3 times daily MEDICATION NAME: calcium D-Glucarate   3 caps contain 180mg of elemental calcium.       Triamcinolone Acetonide (AZMACORT IN) Inhale 2 puffs into the lungs as needed       UNABLE TO FIND 2 tablets 3 times daily MEDICATION NAME: Digestzymes        UNABLE TO FIND 1 tablet daily MEDICATION NAME: Pure Encapsulations       Omega-3 Fatty Acids (OMEGA 3 PO) Take 5 mLs by mouth       Probiotic Product (PROBIOTIC DAILY PO) Take 1 capsule by mouth daily Lacto acid bifidobacterium       Physical Examination:  Vital Signs 11/10/2017   Systolic 102   Diastolic 62   Pulse 111   Temperature 98   Respirations 16   Weight (LB) 187 lb   O2 99     Wt Readings from Last 10 Encounters:   11/10/17 84.8 kg (187 lb)   11/06/17 83.3 kg (183 lb 9.6 oz)   11/03/17 82.6 kg (182 lb)   10/31/17 85 kg (187 lb 8 oz)   10/27/17 85.1 kg (187 lb 9.8 oz)   10/23/17 85.9 kg (189 lb 4.8 oz)   10/23/17 85.9 kg (189 lb 4.8 oz)   10/21/17 87.1 kg (192 lb)   10/19/17 87.1 kg (192 lb)   10/16/17 88.2 kg (194 lb 8 oz)   Constitutional: Well-appearing female in no acute distress.  Eyes: EOMI, PERRL. No scleral icterus.  ENT: Oral mucosa is moist without lesions or thrush.   Lymphatic: Neck is supple without cervical or supraclavicular lymphadenopathy.   Cardiovascular: Regular rate and rhythm.  Respiratory: Clear to auscultation bilaterally. No wheezes or crackles.  Gastrointestinal: Bowel sounds present. Abdomen soft, nontender.    Neurologic: Cranial nerves II through XII are intact.   Skin: No rashes,  "petechiae, or bruising noted on exposed skin. Two dry mildly hyperpigmented patches noted on the right posterior lower leg.   Extremities: 1+ lower extremity edema bilaterally, compression stockings in place.    Laboratory Data:   11/10/2017 10:00   Sodium 143   Potassium 3.9   Chloride 108   Carbon Dioxide 28   Urea Nitrogen 16   Creatinine 0.90   GFR Estimate 64   GFR Estimate If Black 78   Calcium 8.0 (L)   Anion Gap 7   Albumin 2.6 (L)   Protein Total 6.0 (L)   Bilirubin Total 0.3   Alkaline Phosphatase 82   ALT 30   AST 26   Glucose 107 (H)   WBC 8.9   Hemoglobin 9.0 (L)   Hematocrit 29.9 (L)   Platelet Count 215   RBC Count 3.42 (L)   MCV 87   MCH 26.3 (L)   MCHC 30.1 (L)   RDW 20.0 (H)   Diff Method Manual Differential   % Neutrophils 81.4   % Lymphocytes 4.4   % Monocytes 5.3   % Eosinophils 0.0   % Basophils 1.8   % Myelocytes 1.8   % Promyelocytes 5.3   Nucleated RBCs 3 (H)   Absolute Neutrophil 7.2   Absolute Lymphocytes 0.4 (L)   Absolute Monocytes 0.5   Absolute Eosinophils 0.0   Absolute Basophils 0.2   Absolute Myelocytes 0.2 (H)   Absolute Promyelocytes 0.5 (H)   Absolute Nucleated RBC 0.2   Poikilocytosis Slight   Polychromasia Moderate   Ovalocytes Slight   Microcytes Present     Assessment and Plan:    1. DLBCL, \"double-hit\"-like, currently on treatment with DA-R-EPOCH  Cycle 1 overall tolerated well other than mucositis and mild infusion reaction (rigors) to continuous infusion of chemotherapy. Interval CT CAP after 1 cycle due to elevated LD and uric acid showed response to treatment. She is thus far tolerating cycle 2 fairly well with some fatigue, nausea, and constipation. Her counts are improving. She will return twice weekly for lab checks. She will be admitted for cycle 3 on 11/16. She will have a PET/CT after cycle 4.     2. Sensation of body shaking.  Stable. Present since starting chemotherapy. Possibly a side effect. She declines a referral to neurology at this time. If worsens, will " need to reconsider referral.     3. Heme  No transfusion needs today. Continue to monitor with twice weekly labs and transfuse if Hgb<8 and Plt<10k. Neutrophilia secondary to Neulasta, given on 11/2 with no evidence of infection on H&P today.    4. ID  Continue ppx ACV 400mg BID, levaquin 250mg daily, and fluconazole 200mg daily.    5. Lower extremity edema with pain  Chronic condition likely exacerbated by steroids and excessive fluids. US on 10/21 negative for DVT bilaterally. Continue Lasix 20 mg x 3 more days. Continue pain medications as prescribed by palliative care.    6. History of stage 1, ER/MO+, HER2- breast cancer s/p bilateral mastectomies and BENJIE/BSO  Follows with Dr. Crawley. Oncotype recurrence score 11%. Was initially on Arimidex ([id number]) but changed to Tamoxifen due to arthralgias. Tamoxifen held since 10/5 and continue to hold with chemotherapy. F/u Dr. Crawley 11/27/17    7. History of papillary thyroid cancer, s/p total thyroidectomy  Follows with Dr. Castro. Has post surgical hypothyroidism. Continue levothyroxine and calcitriol as prescribed. Neck u/s on 11/2 shows no evidence of disease.    8. History of Rachael en Y gastric bypass  Continue supplements (calcium citrate-vitamin D, vitamin D3, magnesium citrate). Also has iron deficiency anemia likely from poor absorption and per Dr. Sauceda, she should continue 325mg PO ferrous sulfate TID. She should take stool softeners if issues with iron induced constipation.    9. Chronic chest wall pain s/p mastectomies  Follows with Dr. Benitez. Palliative care to continue to prescribe pain medications. Taking MS Contin, Oxycodone for breakthrough pain, and celebrex.     10. Constipation.  Recommend increasing Dulcolax temporarily until resumes regular bowel movements. Continue with MiraLax and Senna-S. If no results in 1-2 days, recommend considering a dose of magnesium citrate.        Jessica Cox PA-C  [facility]  [address] [place] [address]  [place] [address]  [phone]      Again, thank you for allowing me to participate in the care of your patient.      Sincerely,    Jessica Cox PA-C

## 2017-11-10 NOTE — LETTER
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 She is currently undergoing treatment with DA-R-EPOCH. Her past medical history is significant for breast cancer in 2011 s/p bilateral mastectomies and BENJIE/BSO up until recently on Tamoxifen, papillary thyroid cancer s/p total thyroidectomy, chronic chest wall pain, and asthma. Briefly, her oncologic history is as follows:    She presented in 8/2017 with dramatically worse chest and back pain. CT 9/1/17 showed lytic lesion right 10th rib extending to right T9-10 neural foramen with vertebral body invasion. There was associated conglomerate of lymphadenopathy around the mid T-spine. She had a CT guided biopsy showing B-cell high grade lymphoma. Pathology showed \"The morphology shows a population of large cells, diffuse lymphoid infiltrate. The cells are atypical with abundant mitotic and apoptotic activity and single cell necrosis. Tumor is CD45 and CD79a positive and focally weakly CD30 positive. The other stains revealed positivity for CD20, BCL6, BCL2 and MUM1, and CD10 and CD21 negative. The CD5 and CD3 highlighted background T-cells.  MYC expression was equivocal with approximately 40% nuclei staining.  Ki-67 proliferative index is greater than 90-95%. The immunohistochemistry is suggestive of non-GCB immunophenotype of diffuse large B-cell lymphoma. The cytogenetics did not show rearrangement of the BCL2 or c-MYC. There is the presence of BCL6 rearrangement in 78% of cells and gains of MYC and BCL2, but no amplifications.\"     Staging LP and BMBx were negative for lymphoma. She started DA-REPOCH 10/6/17. She did well with chemo other than rigors during CIVI. D/c 10/11/17. Given Neulasta 10/12/17. Post cycle 1 " complicated by mucositis and worsening of chronic LE peripheral edema. She began cycle 2 on 10/27/17. Due to a rise in her LD and uric acid levels on that admission day, she underwent a CT scan which showed response to therapy with improvement in her mediastinal lymphadenopathy and right chest wall mass. She comes in today for routine follow up.     Interval History:  Patient reports that she has felt more tired this week. She has also noticed some dizziness with standing. She is drinking at least 32 ounces of fluid a day. She does have some abdominal pain with eating. She reports alternating with constipation and diarrhea. She is currently more constipated. She is taking 2 Colace a day and MiraLAX once a day. She has had headaches this week. She denies any fevers. She reports one day with more significant central chest pain that has since improved. For her headaches she found relief from Imitrex and Tylenol. She continues to feel general shakiness of her body but seems worse when she is active. She feels this is chemotherapy related. She continues on 20 mg of Lasix a day and her leg swelling remains unchanged. She continues to use compression stockings. She has noticed an area of dark her skin on her right leg and wonders about this. She denies other concerns.     Current Outpatient Prescriptions   Medication Sig Dispense Refill     prochlorperazine (COMPAZINE) 10 MG tablet Take 1 tablet (10 mg) by mouth every 6 hours as needed for nausea or vomiting 60 tablet 3     ENOVARX-LIDOCAINE HCL 10 % CREA Externally apply 1 g topically 3 times daily as needed 60 g 3     fluconazole (DIFLUCAN) 200 MG tablet Take 1 tablet (200 mg) by mouth daily 30 tablet 0     cetirizine (ZYRTEC ALLERGY) 10 MG tablet Take 1 tablet (10 mg) by mouth 3 times daily On hold for lab test. 30 tablet 0     acyclovir (ZOVIRAX) 400 MG tablet Take 1 tablet (400 mg) by mouth 2 times daily 60 tablet 0     dexamethasone (DECADRON) 4 MG tablet Take 8 mg  (2 tab) by mouth 11/1 and 11/2, then 4 mg (1 tab) by mouth 11/3. 5 tablet 0     diclofenac (VOLTAREN) 1 % GEL topical gel Apply affected area two times daily PRN using enclosed dosing card. 100 g 0     furosemide (LASIX) 20 MG tablet Take 2 tablets (40 mg) by mouth daily for 2 days 4 tablet 0     levofloxacin (LEVAQUIN) 250 MG tablet Take 1 tablet (250 mg) by mouth daily 30 tablet 0     allopurinol (ZYLOPRIM) 300 MG tablet Take 1 tablet (300 mg) by mouth daily 30 tablet 0     ferrous sulfate (IRON) 325 (65 FE) MG tablet Take 1 tablet (325 mg) by mouth 3 times daily (with meals) 90 tablet 0     bisacodyl (DULCOLAX) 5 MG EC tablet Take 3 tablets (15 mg) by mouth daily as needed for constipation 30 tablet 0     docusate sodium (COLACE) 100 MG tablet Take 100 mg by mouth daily 30 tablet 0     polyethylene glycol (MIRALAX) powder Take 17 g (1 capful) by mouth daily 510 g 0     magic mouthwash suspension (diphenhydrAMINE, lidocaine, aluminum-magnesium & simethicone) Swish and spit 10 mLs in mouth every 6 hours as needed for mouth sores 360 mL 1     cyclobenzaprine (FLEXERIL) 10 MG tablet Take 1 tablet (10 mg) by mouth 3 times daily as needed 90 tablet 0     senna-docusate (SENOKOT-S;PERICOLACE) 8.6-50 MG per tablet Take 1-2 tablets by mouth 2 times daily as needed for constipation 60 tablet 0     oxyCODONE (ROXICODONE) 30 MG IR tablet Take 1 tablet (30 mg) by mouth every 4 hours as needed for moderate to severe pain 180 tablet 0     morphine (MS CONTIN) 30 MG 12 hr tablet Take 1 tablet (30 mg) by mouth every 8 hours . Take an addition pill at night such that your evening dose is 60mg 120 tablet 0     UNABLE TO FIND Take 2 capsules by mouth 3 times daily Muscle Mag. 2 caps contain B1 20mg, B2 20mg, B6 10mg, magesium 20mg, manganese 2mg.       Morphine Sulfate (MS CONTIN PO) Take 60 mg by mouth daily Takes at 8pm       lidocaine-prilocaine (EMLA) cream Apply topically as needed (port access pain.) 30 g 1     SUMAtriptan  (IMITREX) 50 MG tablet Take 1 tablet (50 mg) by mouth at onset of headache for migraine (may repeat in 2 hours as needed, max 2 tablets daily) 9 tablet 3     calcitRIOL (ROCALTROL) 0.25 MCG capsule TAKE 2 CAPSULES BY MOUTH EVERY MORNING AND TAKE 1 CAPSULE BY MOUTH EVERY NIGHT AT BEDTIME (Patient taking differently: TAKE 2 CAPSULES BY MOUTH EVERY MORNING AND TAKE 1 CAPSULE BY MOUTH at noon) 270 capsule 3     celecoxib (CELEBREX) 200 MG capsule TAKE ONE CAPSULE BY MOUTH TWICE DAILY  56 capsule 0     order for DME Compression stockings, medium grade, please measure and fit. Please dispense a pair. 1 Units 0     diphenhydrAMINE (BENADRYL) 50 MG capsule Take 1 capsule (50 mg) by mouth nightly as needed for itching or sleep (Patient not taking: Reported on 11/3/2017) 56 capsule      methocarbamol (ROBAXIN) 750 MG tablet Take 1 tablet (750 mg) by mouth 4 times daily as needed . Ok to take a 5th Robaxin on very severe days. (Patient taking differently: Take 750 mg by mouth 4 times daily . Ok to take a 5th Robaxin on very severe days.) 360 tablet 1     ondansetron (ZOFRAN-ODT) 8 MG ODT tab Take 1 tablet (8 mg) by mouth every 8 hours as needed for nausea or vomiting 90 tablet 3     COMPOUND CONTAINING CONTROLLED SUBSTANCE (CMPD RX) - PHARMACY TO MIX COMPOUNDED MEDICATION Apply small amount to affected area two times daily. (Patient not taking: Reported on 11/3/2017) 120 g 6     diazepam (VALIUM) 5 MG tablet Take 1 tablet (5 mg) by mouth 3 times daily as needed For muscle spasms (Patient taking differently: Take 5 mg by mouth 3 times daily For muscle spasms) 90 tablet 2     levothyroxine (SYNTHROID/LEVOTHROID) 112 MCG tablet Mon-Sat: (2 tabs daily) Sunday: 3 tabs (Patient taking differently: 112 mcg Mon-Sat: (2 tabs daily) Sunday: 3 tabs) 189 tablet 3     hydrochlorothiazide (MICROZIDE) 12.5 MG capsule Take 1 capsule (12.5 mg) by mouth daily 90 capsule 2     montelukast (SINGULAIR) 10 MG tablet TAKE TWO TABLETS BY MOUTH TWICE  DAILY 120 tablet 11     EPINEPHrine (EPIPEN 2-CARIDAD) 0.3 MG/0.3ML injection Inject 0.3 mLs (0.3 mg) into the muscle once as needed for anaphylaxis 0.6 mL 3     potassium chloride SA (POTASSIUM CHLORIDE) 20 MEQ CR tablet Take 1 tablet (20 mEq) by mouth daily 90 tablet 3     VENTOLIN  (90 BASE) MCG/ACT Inhaler INHALE 2 PUFFS INTO THE LUNGS EVERY 4 HOURS AS NEEDED FOR SHORTNESS OF BREATH OR DIFFICULT BREATHING OR WHEEZING (Patient not taking: Reported on 11/3/2017) 18 g 3     cromolyn (OPTICROM) 4 % ophthalmic solution Place 1 drop into both eyes 4 times daily 10 mL 5     NASALCROM 5.2 MG/ACT AERS Inhaler SPRAY ONE SPRAY( 1 ML) IN NOSTRIL DAILY 1 Bottle 6     Cholecalciferol (VITAMIN D3) 2000 UNITS CAPS Take 5,000 Units by mouth daily Takes 2 tabs daily 60 capsule 4     order for DME Equipment being ordered: compression stockings. 20-30 mm or 30 - 40 mm as patient can tolerate. Physical therapy to determine if they should be above or below the knee. 2 Units 4     order for DME Equipment being ordered: compression bra (Patient not taking: Reported on 11/3/2017) 2 Device 1     ranitidine (ZANTAC) 75 MG tablet Take 1 tablet (75 mg) by mouth 3 times daily 270 tablet 3     aluminum chloride (DRYSOL) 20 % external solution Apply topically At Bedtime (Patient not taking: Reported on 11/3/2017) 60 mL 3     tacrolimus (PROTOPIC) 0.1 % ointment Apply topically 2 times daily (Patient not taking: Reported on 11/3/2017) 60 g 3     triamcinolone (KENALOG) 0.025 % ointment Apply topically 2 times daily Mix with 1 lb jar of vaseline or aquaphor (Patient not taking: Reported on 11/3/2017) 80 g 3     UNABLE TO FIND MEDICATION NAME: Tumeric 3 capsules daily (on hold during chemo)       mometasone (ASMANEX 30 METERED DOSES) 110 MCG/INH inhaler Inhale 1 puff into the lungs daily (Patient not taking: Reported on 11/3/2017) 3 Inhaler 3     UNABLE TO FIND 2 tablets 3 times daily MEDICATION NAME: Natural D-Hist (on hold during chemo)        calcium citrate-vitamin D (CALCIUM CITRATE +D) 315-250 MG-UNIT TABS Take 2 tablets by mouth 3 times daily 120 tablet      calcium carbonate (TUMS) 500 MG chewable tablet Take 2 tablets (1,000 mg) by mouth nightly as needed for other (cramps) 180 chew tab 3     UNABLE TO FIND 3 tablets 3 times daily MEDICATION NAME: calcium D-Glucarate   3 caps contain 180mg of elemental calcium.       Triamcinolone Acetonide (AZMACORT IN) Inhale 2 puffs into the lungs as needed       UNABLE TO FIND 2 tablets 3 times daily MEDICATION NAME: Digestzymes        UNABLE TO FIND 1 tablet daily MEDICATION NAME: Pure Encapsulations       Omega-3 Fatty Acids (OMEGA 3 PO) Take 5 mLs by mouth       Probiotic Product (PROBIOTIC DAILY PO) Take 1 capsule by mouth daily Lacto acid bifidobacterium       Physical Examination:  Vital Signs 11/10/2017   Systolic 102   Diastolic 62   Pulse 111   Temperature 98   Respirations 16   Weight (LB) 187 lb   O2 99     Wt Readings from Last 10 Encounters:   11/10/17 84.8 kg (187 lb)   11/06/17 83.3 kg (183 lb 9.6 oz)   11/03/17 82.6 kg (182 lb)   10/31/17 85 kg (187 lb 8 oz)   10/27/17 85.1 kg (187 lb 9.8 oz)   10/23/17 85.9 kg (189 lb 4.8 oz)   10/23/17 85.9 kg (189 lb 4.8 oz)   10/21/17 87.1 kg (192 lb)   10/19/17 87.1 kg (192 lb)   10/16/17 88.2 kg (194 lb 8 oz)   Constitutional: Well-appearing female in no acute distress.  Eyes: EOMI, PERRL. No scleral icterus.  ENT: Oral mucosa is moist without lesions or thrush.   Lymphatic: Neck is supple without cervical or supraclavicular lymphadenopathy.   Cardiovascular: Regular rate and rhythm.  Respiratory: Clear to auscultation bilaterally. No wheezes or crackles.  Gastrointestinal: Bowel sounds present. Abdomen soft, nontender.    Neurologic: Cranial nerves II through XII are intact.   Skin: No rashes, petechiae, or bruising noted on exposed skin. Two dry mildly hyperpigmented patches noted on the right posterior lower leg.   Extremities: 1+ lower extremity  "edema bilaterally, compression stockings in place.    Laboratory Data:   11/10/2017 10:00   Sodium 143   Potassium 3.9   Chloride 108   Carbon Dioxide 28   Urea Nitrogen 16   Creatinine 0.90   GFR Estimate 64   GFR Estimate If Black 78   Calcium 8.0 (L)   Anion Gap 7   Albumin 2.6 (L)   Protein Total 6.0 (L)   Bilirubin Total 0.3   Alkaline Phosphatase 82   ALT 30   AST 26   Glucose 107 (H)   WBC 8.9   Hemoglobin 9.0 (L)   Hematocrit 29.9 (L)   Platelet Count 215   RBC Count 3.42 (L)   MCV 87   MCH 26.3 (L)   MCHC 30.1 (L)   RDW 20.0 (H)   Diff Method Manual Differential   % Neutrophils 81.4   % Lymphocytes 4.4   % Monocytes 5.3   % Eosinophils 0.0   % Basophils 1.8   % Myelocytes 1.8   % Promyelocytes 5.3   Nucleated RBCs 3 (H)   Absolute Neutrophil 7.2   Absolute Lymphocytes 0.4 (L)   Absolute Monocytes 0.5   Absolute Eosinophils 0.0   Absolute Basophils 0.2   Absolute Myelocytes 0.2 (H)   Absolute Promyelocytes 0.5 (H)   Absolute Nucleated RBC 0.2   Poikilocytosis Slight   Polychromasia Moderate   Ovalocytes Slight   Microcytes Present     Assessment and Plan:    1. DLBCL, \"double-hit\"-like, currently on treatment with DA-R-EPOCH  Cycle 1 overall tolerated well other than mucositis and mild infusion reaction (rigors) to continuous infusion of chemotherapy. Interval CT CAP after 1 cycle due to elevated LD and uric acid showed response to treatment. She is thus far tolerating cycle 2 fairly well with some fatigue, nausea, and constipation. Her counts are improving. She will return twice weekly for lab checks. She will be admitted for cycle 3 on 11/16. She will have a PET/CT after cycle 4.     2. Sensation of body shaking.  Stable. Present since starting chemotherapy. Possibly a side effect. She declines a referral to neurology at this time. If worsens, will need to reconsider referral.     3. Heme  No transfusion needs today. Continue to monitor with twice weekly labs and transfuse if Hgb<8 and Plt<10k.     4. " ID  Continue ppx ACV 400mg BID. Hold levaquin 250mg daily and fluconazole 200mg daily, as no longer neutropenic.     5. Lower extremity edema with pain  Chronic condition likely exacerbated by steroids and excessive fluids. US on 10/21 negative for DVT bilaterally. Continue Lasix 20 mg daily. Continue pain medications as prescribed by palliative care. Given tip sheet on increasing protein in diet due to hypoalbuminemia, which may contribute to her leg swelling. Recommend pushing fluids with a goal of 64 oz/day.     6. History of stage 1, ER/IA+, HER2- breast cancer s/p bilateral mastectomies and BENJIE/BSO  Follows with Dr. Crawley. Oncotype recurrence score 11%. Was initially on Arimidex (5992-1113) but changed to Tamoxifen due to arthralgias. Tamoxifen held since 10/5 and continue to hold with chemotherapy. F/u Dr. Crawley 11/27/17    7. History of papillary thyroid cancer, s/p total thyroidectomy  Follows with Dr. Castro. Has post surgical hypothyroidism. Continue levothyroxine and calcitriol as prescribed. Neck u/s on 11/2 shows no evidence of disease.    8. History of Rachael en Y gastric bypass  Continue supplements (calcium citrate-vitamin D, vitamin D3, magnesium citrate). Also has iron deficiency anemia likely from poor absorption and per Dr. Sauceda, she should continue 325mg PO ferrous sulfate TID.     9. Chronic chest wall pain s/p mastectomies  Follows with Dr. Benitez. Palliative care to continue to prescribe pain medications. Taking MS Contin, Oxycodone for breakthrough pain, and celebrex.     10. Constipation.  Under control with Colace and MiraLax, which she will continue. Recommend adding a fiber supplement to keep bowel regular.     11. Dry skin.  Recommend Eucerin cream to dry skin on legs.     Jessica Cxo PA-C  [facility]  [address] [place] [address]  [phone]

## 2017-11-10 NOTE — NURSING NOTE
Chief Complaint   Patient presents with     Port Draw     labs drawn from port by RN       Port accessed, labs drawn, flushed with NS & heparin.  Patient checked in for infusion visit.  Ginette Jordan RN

## 2017-11-10 NOTE — PROGRESS NOTES
Infusion Nursing Note:  Tisha Arias presents today for IVF only, no blood products needed.    Patient seen by provider today: Yes: Kenny    Treatment Conditions:  Hgb: 9  Plt: 251 today--does not meet transfusion parameters    Intravenous Access:  Implanted Port.  Access dc at time of discharge.      Note: Reports feeling fatigued and dizzy when standing at times, otherwise no issues, no fevers or chills.        Post Infusion Assessment:  Patient tolerated infusion without incident.  Blood return noted pre and post infusion.    Discharge Plan:   Prescription refills given for emla.  Discharge instructions reviewed with: Patient.  Patient and/or family verbalized understanding of discharge instructions and all questions answered.  Copy of AVS reviewed with patient and/or family.  Patient will return 11/16 for next appointment.  Patient discharged in stable condition accompanied by: self.  Departure Mode: Ambulatory.    Rahel Wahl RN

## 2017-11-10 NOTE — PATIENT INSTRUCTIONS
Contact numbers:  Clinic Main Line: [phone]  Nursing Triage:  [phone]  After hours: [phone]    Call triage with chills and/or temperature greater than or equal to 100.4, uncontrolled nausea, vomiting, diarrhea, constipation, dizziness, light headedness, shortness of breath, chest pain, unexplained bruising, bleeding that doesn't stop with 10 minutes of pressure, or any new/concerning symptoms, and questions or concerns at [phone]  If after hours, weekends, or holidays, call main hospital  at  [phone] and ask for Oncology doctor on call.        November 2017 Sunday Monday Tuesday Wednesday Thursday Friday Saturday                  1     2     [facility] D[facility]    4:00 PM   (15 min.)    [facility]     [facility] INJECTION    4:15 PM   (30 min.)   Nurse,  Oncology Injection   [facility]     US HEAD NECK SOFT TISSUE    5:00 PM   (60 min.)   [id number]   [facility] US     LAB    6:00 PM   (15 min.)    LAB   [facility] 3     [facility] DRAW    4:00 PM   (15 min.)    [facility] DRAW   [facility] Lab Draw     [facility] RETURN    4:25 PM   (50 min.)   Jessica Cox PA-C   [facility] 4       5     6     [facility] DRAW    6:30 AM   (15 min.)    [facility] DRAW   [facility]     [facility] ONC INFUSION 360    7:00 AM   (360 min.)   [facility] INFUSION   [facility] 7     8     9     10     [facility] DRAW    9:30 AM   (15 min.)    [facility] DRAW   [facility] Lab Draw     [facility] ONC INFUSION 360   10:00 AM   (360 min.)   [facility] ONCOLOGY INFUSION   [facility]     [facility] RETURN   11:45 AM   (50 min.)   Jessica Cox PA-C   [facility] 11       12     13     [facility] DRAW    4:00 PM   (15 min.)    [facility] DRAW   [facility] Lab Draw     [facility] RETURN ENDOCRINE    4:15 PM   (30 min.)   Gloria  Avelina GARNETT MD   [facility] Endocrinology 14     15     16     [facility] DRAW    6:30 AM   (15 min.)    [facility] DRAW   [facility] Lab Draw     [facility] RETURN   12:15 PM   (50 min.)   Jessica Cox PA-C   [facility] 17     18       19     20     21     22     23     24     25       26     27     [facility] RETURN    3:45 PM   (30 min.)   Shahla Crawley MD   [facility] 28 29 30 December 2017 Sunday Monday Tuesday Wednesday Thursday Friday Saturday                            1     2       3     4     5     6     7     8     9       10     11     12     13     14     15     16       17     18     19     20     21     22     23       24     25     26     27     28     29     30       31                                               Recent Results (from the past 24 hour(s))   Blood component    Collection Time: 11/10/17  1:00 AM   Result Value Ref Range    Unit Number [id number]     Blood Component Type PlateletPheresis,LeukoRed Irrad (Part 3)     Division Number 00     Status of Unit Ready for patient 11/10/2017 0740     Blood Product Code [id number]     Unit Status KATHLEEN    ABO/Rh type and screen    Collection Time: 11/10/17  9:59 AM   Result Value Ref Range    Units Ordered 2     ABO O     RH(D) Pos     Antibody Screen Neg     Test Valid Only At          [facility]    Specimen Expires 11/13/2017     Crossmatch Red Blood Cells    Blood component    Collection Time: 11/10/17  9:59 AM   Result Value Ref Range    Unit Number [id number]     Blood Component Type Red Blood Cells Leukocyte Reduced     Division Number 00     Status of Unit IN-TRANSIT     Blood Product Code [id number]     Unit Status     Blood component    Collection Time: 11/10/17  9:59 AM   Result Value Ref Range    Unit Number [id number]     Blood Component Type Red Blood Cells Leukocyte Reduced     Division Number 00      Status of Unit IN-TRANSIT     Blood Product Code [id number]     Unit Status     Comprehensive metabolic panel    Collection Time: 11/10/17 10:00 AM   Result Value Ref Range    Sodium 143 133 - 144 mmol/L    Potassium 3.9 3.4 - 5.3 mmol/L    Chloride 108 94 - 109 mmol/L    Carbon Dioxide 28 20 - 32 mmol/L    Anion Gap 7 3 - 14 mmol/L    Glucose 107 (H) 70 - 99 mg/dL    Urea Nitrogen 16 7 - 30 mg/dL    Creatinine 0.90 0.52 - 1.04 mg/dL    GFR Estimate 64 >60 mL/min/1.7m2    GFR Estimate If Black 78 >60 mL/min/1.7m2    Calcium 8.0 (L) 8.5 - 10.1 mg/dL    Bilirubin Total 0.3 0.2 - 1.3 mg/dL    Albumin 2.6 (L) 3.4 - 5.0 g/dL    Protein Total 6.0 (L) 6.8 - 8.8 g/dL    Alkaline Phosphatase 82 40 - 150 U/L    ALT 30 0 - 50 U/L    AST 26 0 - 45 U/L   CBC with platelets differential    Collection Time: 11/10/17 10:00 AM   Result Value Ref Range    WBC 8.9 4.0 - 11.0 10e9/L    RBC Count 3.42 (L) 3.8 - 5.2 10e12/L    Hemoglobin 9.0 (L) 11.7 - 15.7 g/dL    Hematocrit 29.9 (L) 35.0 - 47.0 %    MCV 87 78 - 100 fl    MCH 26.3 (L) 26.5 - 33.0 pg    MCHC 30.1 (L) 31.5 - 36.5 g/dL    RDW 20.0 (H) 10.0 - 15.0 %    Platelet Count 215 150 - 450 10e9/L    Diff Method Manual Differential     % Neutrophils 81.4 %    % Lymphocytes 4.4 %    % Monocytes 5.3 %    % Eosinophils 0.0 %    % Basophils 1.8 %    % Myelocytes 1.8 %    % Promyelocytes 5.3 %    Nucleated RBCs 3 (H) 0 /100    Absolute Neutrophil 7.2 1.6 - 8.3 10e9/L    Absolute Lymphocytes 0.4 (L) 0.8 - 5.3 10e9/L    Absolute Monocytes 0.5 0.0 - 1.3 10e9/L    Absolute Eosinophils 0.0 0.0 - 0.7 10e9/L    Absolute Basophils 0.2 0.0 - 0.2 10e9/L    Absolute Myelocytes 0.2 (H) 0 10e9/L    Absolute Promyeloctyes 0.5 (H) 0 10e9/L    Absolute Nucleated RBC 0.2     Poikilocytosis Slight     Polychromasia Moderate     Ovalocytes Slight     Microcytes Present

## 2017-11-10 NOTE — MR AVS SNAPSHOT
After Visit Summary   11/10/2017    Tisha Arias    MRN: [id number]           Patient Information     Date Of Birth          1960        Visit Information        Provider Department      11/10/2017 12:00 PM Jessica Cox PA-C [facility]        Today's Diagnoses     High grade B-cell lymphoma (H)    -  1      Care Instructions    Increase fluid intake to 64 oz/day.   Start a fiber supplement daily (Metamucil, Fibercon or fiber gummies).  Increase protein in diet to help with leg swelling.   Hold fluconazole and levofloxacin for now.   Apply Eucerin cream to dry skin on legs.     Tips to Increase the Protein in Your Diet  You may need more protein in your diet to help you heal from an illness, surgery or wound. Extra protein can also help you gain weight. Here are some ideas for adding high-protein foods to your meals.  Meat and fish    Add chopped cooked meat to vegetables, salads, casseroles, soups, sauces and biscuit dough.    Use in omelets, soufflés, quiches, sandwich fillings and chicken or turkey stuffing.    Wrap in pie crust or biscuit dough to make a turnover.    Add to stuffed baked potatoes.    Make a dip with diced meat or flaked fish mixed with sour cream and spices.  Chopped, hard-cooked eggs    Add to salads.    Use for snacks and sandwich filling.  High-protein milk  To make high-protein milk, mix 1 quart whole milk with 1 cup powdered milk.    Add to cream soups, mashed potatoes, scrambled eggs, cereals and dried eggnog mix.    Use as an ingredient in puddings, custards, hot chocolate, milk shakes and pancakes.  Powdered milk    If you don't have any high-protein milk on hand, you can use powdered milk. Add 3 tablespoons to:    gravies, sauces, cream soups, mayonnaise    casseroles, meat patties, meatloaf, tuna salad, deviled ham    scalloped or mashed potatoes, creamed spinach    scrambled eggs, egg salad    cereals    yogurt, milk drinks, ice  cream, frozen desserts, puddings, custards.    Add 4 to 6 tablespoons powdered milk to make:    cream sauces    breads, muffins, pancakes, waffles, cookies, cakes    cream pies, frostings, cake fillings    fruit cobblers, bread or rice pudding, gelatin desserts.    For high-protein eggnog, add 3 to 6 tablespoons powdered milk to prepared eggnog.  Hard or soft cheese    Melt on sandwiches, breads, tortillas, hamburgers, hot dogs, other meats, vegetables, eggs and pies.    Grate into soups, chili, sauces, casseroles, vegetables, potatoes, rice, noodles or meatloaf.    Eat with toast or crackers, or melt for torres dip.  Cottage cheese or ricotta cheese    Mix with or scoop on top of fruits and vegetables.    Add to casseroles, lasagna, spaghetti, noodles and egg dishes (omelets, scrambled eggs, soufflés).    Use in gelatin, pudding-type desserts, cheesecake and pancake batter.    Use to stuff crepes, pasta shells or manicotti.  Fruit yogurt    Blend with fruits for a fruit smoothie.    Use as a dip for fruits and vegetables.    Scoop on top of pancakes or waffles.  Tofu    Blend silken tofu with fruits and juices for a smoothie.    Add chunks of firm tofu to soups and stews, or crumble into meatloaf.    Blend dried onion soup mix into soft or silken tofu for dip.    Use pureed silken tofu for part of the mayonnaise, sour cream, cream cheese or ricotta cheese called for in recipes.  Beans    Use cooked beans or peas in soups, casseroles, pasta, tacos and burritos.  Nuts and seeds  Note: Avoid in children under age 3.    Use in casseroles, breads, muffins, pancakes, cookies and waffles.    Sprinkle on fruits, cereals, ice cream, yogurt, vegetables and salads.    Mix with raisins, dried fruits and chocolate chips for a snack.  Nut butters  Note: Avoid in children under age 3.    Spread on sandwiches, toast, muffins, crackers, waffles, pancakes and fruit slices.    Use as a dip for raw vegetables.    Blend with milk  drinks, or swirl through ice cream, yogurt or hot cereal.  Nutrition supplements (nutrition bars, drinks and powders)    Add powders to milk drinks and desserts.    Mix with ice cream, milk and fruit for a high-protein milk shake.  Children under age 3 should avoid seeds, nuts, nut butters and hard pieces of fruit.  For informational purposes only. Not to replace the advice of your health care provider.  Copyright   2005 [facility]. All rights reserved. [employer] 472181 - REV 02/16.            Follow-ups after your visit        Your next 10 appointments already scheduled     Nov 22, 2017  3:30 PM CST   (Arrive by 3:15 PM)   INJECTION with  Oncology Injection Nurse   [facility] ([facility])    [address] [place] [id number]   [phone]            Nov 27, 2017  4:00 PM CST   (Arrive by 3:45 PM)   Return Visit with Shahla Crawley MD   [facility] ([facility])    [address] [place] [id number]   [phone]            Nov 28, 2017  4:30 PM CST   [facility] Draw with  [facility] D[facility]   [facility] ([facility])    [address] [place] [id number]   [phone]            Nov 28, 2017  5:00 PM CST   RETURN ONC with Garima Sauceda MD   [facility] ([facility])    [address] [place] [id number]   [phone]            May 21, 2018  4:20 PM CDT   (Arrive by 4:05 PM)   RETURN ENDOCRINE with Avelina Castro MD   [facility] Endocrinology ([facility])    [address] [place] [address]  3rd [facility] [id number] [phone]              Who to contact     If you have questions or need follow up information about today's clinic visit or your schedule please contact [facility] directly at [phone].  Normal or non-critical lab and imaging results will be communicated to you by [employer], letter or phone within 4 business days after the clinic has received the results. If you do not hear from us within 7 days, please contact the clinic through [employer] or phone. If you have a critical or abnormal lab result, we will notify you by phone as soon as possible.  Submit refill requests through [employer] or call your pharmacy and they will forward the refill request to us. Please allow 3 business days for your refill to be completed.          Additional Information About Your Visit        [employer] Information     [employer] gives you secure access to your electronic health record. If you see a primary care provider, you can also send messages to your care team and make appointments. If you have questions, please call your primary care clinic.  If you do not have a primary care provider, please call [phone] and they will assist you.        Care EveryWhere ID     This is your Care EveryWhere ID. This could be used by other organizations to access your [facility] medical records  [id number]         Blood Pressure from Last 3 Encounters:   11/20/17 129/74   11/16/17 125/73   11/13/17 125/74    Weight from Last 3 Encounters:   11/18/17 85.8 kg (189 lb 1.6 oz)   11/16/17 85.1 kg (187 lb 11.2 oz)   11/13/17 85.3 kg (188 lb)              Today, you had the following     No orders found for display         Today's Medication Changes          These changes are accurate as of: 11/10/17 11:59 PM.  If you have any questions, ask your nurse or doctor.               These medicines have changed or have updated prescriptions.        Dose/Directions    calcitRIOL 0.25 MCG capsule   Commonly known as:  ROCALTROL   This may have changed:  additional instructions   Used for:  Postsurgical hypothyroidism, Papillary carcinoma, follicular variant (H), Metastasis to cervical lymph node (H)        TAKE 2  CAPSULES BY MOUTH EVERY MORNING AND TAKE 1 CAPSULE BY MOUTH EVERY NIGHT AT BEDTIME   Quantity:  270 capsule   Refills:  3       diazepam 5 MG tablet   Commonly known as:  VALIUM   This may have changed:    - when to take this  - additional instructions   Used for:  Chest wall pain        Dose:  5 mg   Take 1 tablet (5 mg) by mouth 3 times daily as needed For muscle spasms   Quantity:  90 tablet   Refills:  2       levothyroxine 112 MCG tablet   Commonly known as:  SYNTHROID/LEVOTHROID   This may have changed:    - how much to take  - additional instructions   Used for:  Postsurgical hypothyroidism, Papillary carcinoma, follicular variant (H), Postsurgical hypoparathyroidism (H), Thyroid cancer (H)        Mon-Sat: (2 tabs daily) Sunday: 3 tabs   Quantity:  189 tablet   Refills:  3       methocarbamol 750 MG tablet   Commonly known as:  ROBAXIN   This may have changed:    - when to take this  - additional instructions   Used for:  Myofascial pain        Dose:  750 mg   Take 1 tablet (750 mg) by mouth 4 times daily as needed . Ok to take a 5th Robaxin on very severe days.   Quantity:  360 tablet   Refills:  1                Primary Care Provider Office Phone # Fax #    Shahla Raul Crawley [phone]       [address] [facility] [address]  [place] [id number]        Equal Access to Services     RODRIGO ZAMORA AH: Kevin johnsono Soesteban, waaxda luqadaha, qaybta kaalmada adeegyada, ranjan martin. So [facility] [phone].    ATENCIÓN: Si habla español, tiene a stiles disposición servicios gratuitos de asistencia lingüística. Llame al [phone].    We comply with applicable federal civil rights laws and [place] laws. We do not discriminate on the basis of race, color, national origin, age, disability, sex, sexual orientation, or gender identity.            Thank you!     Thank you for choosing [facility]  for your care. Our goal is always to provide you with  excellent care. Hearing back from our patients is one way we can continue to improve our services. Please take a few minutes to complete the written survey that you may receive in the mail after your visit with us. Thank you!             Your Updated Medication List - Protect others around you: Learn how to safely use, store and throw away your medicines at www.disposemymeds.org.          This list is accurate as of: 11/10/17 11:59 PM.  Always use your most recent med list.                   Brand Name Dispense Instructions for use Diagnosis    acyclovir 400 MG tablet    ZOVIRAX    60 tablet    Take 1 tablet (400 mg) by mouth 2 times daily    High grade B-cell lymphoma (H)       allopurinol 300 MG tablet    ZYLOPRIM    30 tablet    Take 1 tablet (300 mg) by mouth daily    High grade B-cell lymphoma (H)       aluminum chloride 20 % external solution    DRYSOL    60 mL    Apply topically At Bedtime    Rash, Intertrigo       AZMACORT IN      Inhale 2 puffs into the lungs as needed        bisacodyl 5 MG EC tablet     30 tablet    Take 3 tablets (15 mg) by mouth daily as needed for constipation    Constipation, unspecified constipation type       calcitRIOL 0.25 MCG capsule    ROCALTROL    270 capsule    TAKE 2 CAPSULES BY MOUTH EVERY MORNING AND TAKE 1 CAPSULE BY MOUTH EVERY NIGHT AT BEDTIME    Postsurgical hypothyroidism, Papillary carcinoma, follicular variant (H), Metastasis to cervical lymph node (H)       CALCIUM CITRATE +D 315-250 MG-UNIT Tabs per tablet   Generic drug:  calcium citrate-vitamin D     120 tablet    Take 2 tablets by mouth 3 times daily        celecoxib 200 MG capsule    celeBREX    56 capsule    TAKE ONE CAPSULE BY MOUTH TWICE DAILY    Chest wall pain       cetirizine 10 MG tablet    ZYRTEC ALLERGY    30 tablet    Take 1 tablet (10 mg) by mouth 3 times daily On hold for lab test.    High grade B-cell lymphoma (H)       COMPOUND CONTAINING CONTROLLED SUBSTANCE - PHARMACY TO MIX COMPOUNDED MEDICATION     CMPD RX    120 g    Apply small amount to affected area two times daily.    Neoplasm related pain       cromolyn 4 % ophthalmic solution    OPTICROM    10 mL    Place 1 drop into both eyes 4 times daily    Idiopathic mast cell activation syndrome (H)       cyclobenzaprine 10 MG tablet    FLEXERIL    90 tablet    Take 1 tablet (10 mg) by mouth 3 times daily as needed    High grade B-cell lymphoma (H)       dexamethasone 4 MG tablet    DECADRON    5 tablet    Take 8 mg (2 tab) by mouth 11/1 and 11/2, then 4 mg (1 tab) by mouth 11/3.    High grade B-cell lymphoma (H)       diazepam 5 MG tablet    VALIUM    90 tablet    Take 1 tablet (5 mg) by mouth 3 times daily as needed For muscle spasms    Chest wall pain       diclofenac 1 % Gel topical gel    VOLTAREN    100 g    Apply affected area two times daily PRN using enclosed dosing card.    Myofascial pain       diphenhydrAMINE 50 MG capsule    BENADRYL    56 capsule    Take 1 capsule (50 mg) by mouth nightly as needed for itching or sleep    High grade B-cell lymphoma (H)       docusate sodium 100 MG tablet    COLACE    30 tablet    Take 100 mg by mouth daily    Acute constipation       ENOVARX-LIDOCAINE HCL 10 % Crea     60 g    Externally apply 1 g topically 3 times daily as needed    Chest wall pain       EPINEPHrine 0.3 MG/0.3ML injection 2-pack    EPIPEN 2-CARIDAD    0.6 mL    Inject 0.3 mLs (0.3 mg) into the muscle once as needed for anaphylaxis        ferrous sulfate 325 (65 FE) MG tablet    IRON    90 tablet    Take 1 tablet (325 mg) by mouth 3 times daily (with meals)    Status post bariatric surgery       fluconazole 200 MG tablet    DIFLUCAN    30 tablet    Take 1 tablet (200 mg) by mouth daily    High grade B-cell lymphoma (H)       furosemide 20 MG tablet    LASIX    4 tablet    Take 2 tablets (40 mg) by mouth daily for 2 days    High grade B-cell lymphoma (H)       hydrochlorothiazide 12.5 MG capsule    MICROZIDE    90 capsule    Take 1 capsule (12.5 mg) by  mouth daily    Hypocalcemia       levofloxacin 250 MG tablet    LEVAQUIN    30 tablet    Take 1 tablet (250 mg) by mouth daily    High grade B-cell lymphoma (H)       levothyroxine 112 MCG tablet    SYNTHROID/LEVOTHROID    189 tablet    Mon-Sat: (2 tabs daily) Sunday: 3 tabs    Postsurgical hypothyroidism, Papillary carcinoma, follicular variant (H), Postsurgical hypoparathyroidism (H), Thyroid cancer (H)       lidocaine visc 2% 2.5mL/5mL & maalox/mylanta w/ simeth 2.5mL/5mL & diphenhydrAMINE 5mg/5mL Susp suspension    [facility]    360 mL    Swish and spit 10 mLs in mouth every 6 hours as needed for mouth sores    Stomatitis and mucositis, High grade B-cell lymphoma (H)       lidocaine-prilocaine cream    EMLA    30 g    Apply topically as needed (port access pain.)    Neoplasm related pain, Chest wall pain       methocarbamol 750 MG tablet    ROBAXIN    360 tablet    Take 1 tablet (750 mg) by mouth 4 times daily as needed . Ok to take a 5th Robaxin on very severe days.    Myofascial pain       mometasone 110 MCG/INH inhaler    ASMANEX 30 METERED DOSES    3 Inhaler    Inhale 1 puff into the lungs daily    Intermittent asthma, uncomplicated       montelukast 10 MG tablet    SINGULAIR    120 tablet    TAKE TWO TABLETS BY MOUTH TWICE DAILY    Idiopathic mast cell activation syndrome (H)       * MS CONTIN PO      Take 60 mg by mouth daily Takes at 8pm        * morphine 30 MG 12 hr tablet    MS CONTIN    120 tablet    Take 1 tablet (30 mg) by mouth every 8 hours . Take an addition pill at night such that your evening dose is 60mg    Neoplasm related pain       NASALCROM 5.2 MG/ACT Aers Inhaler   Generic drug:  cromolyn sodium     1 Bottle    SPRAY ONE SPRAY( 1 ML) IN NOSTRIL DAILY    Mast cell disease, systemic       ondansetron 8 MG ODT tab    ZOFRAN ODT    90 tablet    Take 1 tablet (8 mg) by mouth every 8 hours as needed for nausea or vomiting    Nausea with vomiting       * order for DME     2 Units     Equipment being ordered: compression stockings. 20-30 mm or 30 - 40 mm as patient can tolerate. Physical therapy to determine if they should be above or below the knee.    Venous stasis       * order for DME     2 Device    Equipment being ordered: compression bra    Malignant neoplasm of right female breast, unspecified site of breast       * order for DME     1 Units    Compression stockings, medium grade, please measure and fit. Please dispense a pair.    Peripheral edema       oxyCODONE IR 30 MG tablet    ROXICODONE    180 tablet    Take 1 tablet (30 mg) by mouth every 4 hours as needed for moderate to severe pain    High grade B-cell lymphoma (H)       polyethylene glycol powder    MIRALAX    510 g    Take 17 g (1 capful) by mouth daily    Acute constipation       potassium chloride SA 20 MEQ CR tablet    KLOR-CON    90 tablet    Take 1 tablet (20 mEq) by mouth daily    Hypokalemia       PROBIOTIC DAILY PO      Take 1 capsule by mouth daily Lacto acid bifidobacterium    Breast cancer, unspecified laterality, Thyroid cancer (H), Chronic arthralgias of knees and hips, unspecified laterality       prochlorperazine 10 MG tablet    COMPAZINE    60 tablet    Take 1 tablet (10 mg) by mouth every 6 hours as needed for nausea or vomiting    High grade B-cell lymphoma (H), Nausea       ranitidine 75 MG tablet    ZANTAC    270 tablet    Take 1 tablet (75 mg) by mouth 3 times daily    Mast cell disease, systemic       senna-docusate 8.6-50 MG per tablet    SENOKOT-S;PERICOLACE    60 tablet    Take 1-2 tablets by mouth 2 times daily as needed for constipation    Acute constipation       SUMAtriptan 50 MG tablet    IMITREX    9 tablet    Take 1 tablet (50 mg) by mouth at onset of headache for migraine (may repeat in 2 hours as needed, max 2 tablets daily)    Migraine without status migrainosus, not intractable, unspecified migraine type       tacrolimus 0.1 % ointment    PROTOPIC    60 g    Apply topically 2 times daily     Rash, Intertrigo       triamcinolone 0.025 % ointment    KENALOG    80 g    Apply topically 2 times daily Mix with 1 lb jar of vaseline or aquaphor    Intertrigo, Rash       TUMS 500 MG chewable tablet   Generic drug:  calcium carbonate     180 chew tab    Take 2 tablets (1,000 mg) by mouth nightly as needed for other (cramps)        * UNABLE TO FIND      3 tablets 3 times daily MEDICATION NAME: calcium D-Glucarate  3 caps contain 180mg of elemental calcium.        * UNABLE TO FIND      2 tablets 3 times daily MEDICATION NAME: Natural D-Hist (on hold during chemo)    Breast cancer, unspecified laterality, Papillary carcinoma, follicular variant (H), Chronic musculoskeletal pain       * UNABLE TO FIND      MEDICATION NAME: Tumeric 3 capsules daily (on hold during chemo)        * UNABLE TO FIND      Take 2 capsules by mouth 3 times daily Muscle Mag. 2 caps contain B1 20mg, B2 20mg, B6 10mg, magesium 20mg, manganese 2mg.        * UNABLE TO FIND      2 tablets 3 times daily MEDICATION NAME: Digestzymes    Thyroid cancer (H), Postsurgical hypothyroidism, Postsurgical hypoparathyroidism (H)       * UNABLE TO FIND      1 tablet daily MEDICATION NAME: Pure Encapsulations    Thyroid cancer (H), Postsurgical hypothyroidism, Postsurgical hypoparathyroidism (H)       VENTOLIN  (90 BASE) MCG/ACT Inhaler   Generic drug:  albuterol     18 g    INHALE 2 PUFFS INTO THE LUNGS EVERY 4 HOURS AS NEEDED FOR SHORTNESS OF BREATH OR DIFFICULT BREATHING OR WHEEZING    Mild intermittent asthma without complication       vitamin D3 2000 UNITS Caps     60 capsule    Take 5,000 Units by mouth daily Takes 2 tabs daily    Thyroid cancer (H), Postsurgical hypothyroidism, Papillary carcinoma, follicular variant (H), Postsurgical hypoparathyroidism (H)       * Notice:  This list has 11 medication(s) that are the same as other medications prescribed for you. Read the directions carefully, and ask your doctor or other care provider to review  them with you.

## 2017-11-10 NOTE — PROGRESS NOTES
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 She is currently undergoing treatment with DA-R-EPOCH. Her past medical history is significant for breast cancer in 2011 s/p bilateral mastectomies and BENJIE/BSO up until recently on Tamoxifen, papillary thyroid cancer s/p total thyroidectomy, chronic chest wall pain, and asthma. Briefly, her oncologic history is as follows:    She presented in 8/2017 with dramatically worse chest and back pain. CT 9/1/17 showed lytic lesion right 10th rib extending to right T9-10 neural foramen with vertebral body invasion. There was associated conglomerate of lymphadenopathy around the mid T-spine. She had a CT guided biopsy showing B-cell high grade lymphoma. Pathology showed \"The morphology shows a population of large cells, diffuse lymphoid infiltrate. The cells are atypical with abundant mitotic and apoptotic activity and single cell necrosis. Tumor is CD45 and CD79a positive and focally weakly CD30 positive. The other stains revealed positivity for CD20, BCL6, BCL2 and MUM1, and CD10 and CD21 negative. The CD5 and CD3 highlighted background T-cells.  MYC expression was equivocal with approximately 40% nuclei staining.  Ki-67 proliferative index is greater than 90-95%. The immunohistochemistry is suggestive of non-GCB immunophenotype of diffuse large B-cell lymphoma. The cytogenetics did not show rearrangement of the BCL2 or c-MYC. There is the presence of BCL6 rearrangement in 78% of cells and gains of MYC and BCL2, but no amplifications.\"     Staging LP and BMBx were negative for lymphoma. She started DA-REPOCH 10/6/17. She did well with chemo other than rigors during CIVI. D/c 10/11/17. Given Neulasta 10/12/17. Post cycle 1 complicated by mucositis and worsening of chronic LE peripheral edema. She began cycle 2 on " 10/27/17. Due to a rise in her LD and uric acid levels on that admission day, she underwent a CT scan which showed response to therapy with improvement in her mediastinal lymphadenopathy and right chest wall mass. She comes in today for routine follow up.     Interval History:  Patient reports that she has felt more tired this week. She has also noticed some dizziness with standing. She is drinking at least 32 ounces of fluid a day. She does have some abdominal pain with eating. She reports alternating with constipation and diarrhea. She is currently more constipated. She is taking 2 Colace a day and MiraLAX once a day. She has had headaches this week. She denies any fevers. She reports one day with more significant central chest pain that has since improved. For her headaches she found relief from Imitrex and Tylenol. She continues to feel general shakiness of her body but seems worse when she is active. She feels this is chemotherapy related. She continues on 20 mg of Lasix a day and her leg swelling remains unchanged. She continues to use compression stockings. She has noticed an area of dark her skin on her right leg and wonders about this. She denies other concerns.     Current Outpatient Prescriptions   Medication Sig Dispense Refill     prochlorperazine (COMPAZINE) 10 MG tablet Take 1 tablet (10 mg) by mouth every 6 hours as needed for nausea or vomiting 60 tablet 3     ENOVARX-LIDOCAINE HCL 10 % CREA Externally apply 1 g topically 3 times daily as needed 60 g 3     fluconazole (DIFLUCAN) 200 MG tablet Take 1 tablet (200 mg) by mouth daily 30 tablet 0     cetirizine (ZYRTEC ALLERGY) 10 MG tablet Take 1 tablet (10 mg) by mouth 3 times daily On hold for lab test. 30 tablet 0     acyclovir (ZOVIRAX) 400 MG tablet Take 1 tablet (400 mg) by mouth 2 times daily 60 tablet 0     dexamethasone (DECADRON) 4 MG tablet Take 8 mg (2 tab) by mouth 11/1 and 11/2, then 4 mg (1 tab) by mouth 11/3. 5 tablet 0     diclofenac  (VOLTAREN) 1 % GEL topical gel Apply affected area two times daily PRN using enclosed dosing card. 100 g 0     furosemide (LASIX) 20 MG tablet Take 2 tablets (40 mg) by mouth daily for 2 days 4 tablet 0     levofloxacin (LEVAQUIN) 250 MG tablet Take 1 tablet (250 mg) by mouth daily 30 tablet 0     allopurinol (ZYLOPRIM) 300 MG tablet Take 1 tablet (300 mg) by mouth daily 30 tablet 0     ferrous sulfate (IRON) 325 (65 FE) MG tablet Take 1 tablet (325 mg) by mouth 3 times daily (with meals) 90 tablet 0     bisacodyl (DULCOLAX) 5 MG EC tablet Take 3 tablets (15 mg) by mouth daily as needed for constipation 30 tablet 0     docusate sodium (COLACE) 100 MG tablet Take 100 mg by mouth daily 30 tablet 0     polyethylene glycol (MIRALAX) powder Take 17 g (1 capful) by mouth daily 510 g 0     magic mouthwash suspension (diphenhydrAMINE, lidocaine, aluminum-magnesium & simethicone) Swish and spit 10 mLs in mouth every 6 hours as needed for mouth sores 360 mL 1     cyclobenzaprine (FLEXERIL) 10 MG tablet Take 1 tablet (10 mg) by mouth 3 times daily as needed 90 tablet 0     senna-docusate (SENOKOT-S;PERICOLACE) 8.6-50 MG per tablet Take 1-2 tablets by mouth 2 times daily as needed for constipation 60 tablet 0     oxyCODONE (ROXICODONE) 30 MG IR tablet Take 1 tablet (30 mg) by mouth every 4 hours as needed for moderate to severe pain 180 tablet 0     morphine (MS CONTIN) 30 MG 12 hr tablet Take 1 tablet (30 mg) by mouth every 8 hours . Take an addition pill at night such that your evening dose is 60mg 120 tablet 0     UNABLE TO FIND Take 2 capsules by mouth 3 times daily Muscle Mag. 2 caps contain B1 20mg, B2 20mg, B6 10mg, magesium 20mg, manganese 2mg.       Morphine Sulfate (MS CONTIN PO) Take 60 mg by mouth daily Takes at 8pm       lidocaine-prilocaine (EMLA) cream Apply topically as needed (port access pain.) 30 g 1     SUMAtriptan (IMITREX) 50 MG tablet Take 1 tablet (50 mg) by mouth at onset of headache for migraine (may  repeat in 2 hours as needed, max 2 tablets daily) 9 tablet 3     calcitRIOL (ROCALTROL) 0.25 MCG capsule TAKE 2 CAPSULES BY MOUTH EVERY MORNING AND TAKE 1 CAPSULE BY MOUTH EVERY NIGHT AT BEDTIME (Patient taking differently: TAKE 2 CAPSULES BY MOUTH EVERY MORNING AND TAKE 1 CAPSULE BY MOUTH at noon) 270 capsule 3     celecoxib (CELEBREX) 200 MG capsule TAKE ONE CAPSULE BY MOUTH TWICE DAILY  56 capsule 0     order for DME Compression stockings, medium grade, please measure and fit. Please dispense a pair. 1 Units 0     diphenhydrAMINE (BENADRYL) 50 MG capsule Take 1 capsule (50 mg) by mouth nightly as needed for itching or sleep (Patient not taking: Reported on 11/3/2017) 56 capsule      methocarbamol (ROBAXIN) 750 MG tablet Take 1 tablet (750 mg) by mouth 4 times daily as needed . Ok to take a 5th Robaxin on very severe days. (Patient taking differently: Take 750 mg by mouth 4 times daily . Ok to take a 5th Robaxin on very severe days.) 360 tablet 1     ondansetron (ZOFRAN-ODT) 8 MG ODT tab Take 1 tablet (8 mg) by mouth every 8 hours as needed for nausea or vomiting 90 tablet 3     COMPOUND CONTAINING CONTROLLED SUBSTANCE (CMPD RX) - PHARMACY TO MIX COMPOUNDED MEDICATION Apply small amount to affected area two times daily. (Patient not taking: Reported on 11/3/2017) 120 g 6     diazepam (VALIUM) 5 MG tablet Take 1 tablet (5 mg) by mouth 3 times daily as needed For muscle spasms (Patient taking differently: Take 5 mg by mouth 3 times daily For muscle spasms) 90 tablet 2     levothyroxine (SYNTHROID/LEVOTHROID) 112 MCG tablet Mon-Sat: (2 tabs daily) Sunday: 3 tabs (Patient taking differently: 112 mcg Mon-Sat: (2 tabs daily) Sunday: 3 tabs) 189 tablet 3     hydrochlorothiazide (MICROZIDE) 12.5 MG capsule Take 1 capsule (12.5 mg) by mouth daily 90 capsule 2     montelukast (SINGULAIR) 10 MG tablet TAKE TWO TABLETS BY MOUTH TWICE DAILY 120 tablet 11     EPINEPHrine (EPIPEN 2-CARIDAD) 0.3 MG/0.3ML injection Inject 0.3 mLs (0.3  mg) into the muscle once as needed for anaphylaxis 0.6 mL 3     potassium chloride SA (POTASSIUM CHLORIDE) 20 MEQ CR tablet Take 1 tablet (20 mEq) by mouth daily 90 tablet 3     VENTOLIN  (90 BASE) MCG/ACT Inhaler INHALE 2 PUFFS INTO THE LUNGS EVERY 4 HOURS AS NEEDED FOR SHORTNESS OF BREATH OR DIFFICULT BREATHING OR WHEEZING (Patient not taking: Reported on 11/3/2017) 18 g 3     cromolyn (OPTICROM) 4 % ophthalmic solution Place 1 drop into both eyes 4 times daily 10 mL 5     NASALCROM 5.2 MG/ACT AERS Inhaler SPRAY ONE SPRAY( 1 ML) IN NOSTRIL DAILY 1 Bottle 6     Cholecalciferol (VITAMIN D3) 2000 UNITS CAPS Take 5,000 Units by mouth daily Takes 2 tabs daily 60 capsule 4     order for DME Equipment being ordered: compression stockings. 20-30 mm or 30 - 40 mm as patient can tolerate. Physical therapy to determine if they should be above or below the knee. 2 Units 4     order for DME Equipment being ordered: compression bra (Patient not taking: Reported on 11/3/2017) 2 Device 1     ranitidine (ZANTAC) 75 MG tablet Take 1 tablet (75 mg) by mouth 3 times daily 270 tablet 3     aluminum chloride (DRYSOL) 20 % external solution Apply topically At Bedtime (Patient not taking: Reported on 11/3/2017) 60 mL 3     tacrolimus (PROTOPIC) 0.1 % ointment Apply topically 2 times daily (Patient not taking: Reported on 11/3/2017) 60 g 3     triamcinolone (KENALOG) 0.025 % ointment Apply topically 2 times daily Mix with 1 lb jar of vaseline or aquaphor (Patient not taking: Reported on 11/3/2017) 80 g 3     UNABLE TO FIND MEDICATION NAME: Tumeric 3 capsules daily (on hold during chemo)       mometasone (ASMANEX 30 METERED DOSES) 110 MCG/INH inhaler Inhale 1 puff into the lungs daily (Patient not taking: Reported on 11/3/2017) 3 Inhaler 3     UNABLE TO FIND 2 tablets 3 times daily MEDICATION NAME: Natural D-Hist (on hold during chemo)       calcium citrate-vitamin D (CALCIUM CITRATE +D) 315-250 MG-UNIT TABS Take 2 tablets by mouth  3 times daily 120 tablet      calcium carbonate (TUMS) 500 MG chewable tablet Take 2 tablets (1,000 mg) by mouth nightly as needed for other (cramps) 180 chew tab 3     UNABLE TO FIND 3 tablets 3 times daily MEDICATION NAME: calcium D-Glucarate   3 caps contain 180mg of elemental calcium.       Triamcinolone Acetonide (AZMACORT IN) Inhale 2 puffs into the lungs as needed       UNABLE TO FIND 2 tablets 3 times daily MEDICATION NAME: Digestzymes        UNABLE TO FIND 1 tablet daily MEDICATION NAME: Pure Encapsulations       Omega-3 Fatty Acids (OMEGA 3 PO) Take 5 mLs by mouth       Probiotic Product (PROBIOTIC DAILY PO) Take 1 capsule by mouth daily Lacto acid bifidobacterium       Physical Examination:  Vital Signs 11/10/2017   Systolic 102   Diastolic 62   Pulse 111   Temperature 98   Respirations 16   Weight (LB) 187 lb   O2 99     Wt Readings from Last 10 Encounters:   11/10/17 84.8 kg (187 lb)   11/06/17 83.3 kg (183 lb 9.6 oz)   11/03/17 82.6 kg (182 lb)   10/31/17 85 kg (187 lb 8 oz)   10/27/17 85.1 kg (187 lb 9.8 oz)   10/23/17 85.9 kg (189 lb 4.8 oz)   10/23/17 85.9 kg (189 lb 4.8 oz)   10/21/17 87.1 kg (192 lb)   10/19/17 87.1 kg (192 lb)   10/16/17 88.2 kg (194 lb 8 oz)   Constitutional: Well-appearing female in no acute distress.  Eyes: EOMI, PERRL. No scleral icterus.  ENT: Oral mucosa is moist without lesions or thrush.   Lymphatic: Neck is supple without cervical or supraclavicular lymphadenopathy.   Cardiovascular: Regular rate and rhythm.  Respiratory: Clear to auscultation bilaterally. No wheezes or crackles.  Gastrointestinal: Bowel sounds present. Abdomen soft, nontender.    Neurologic: Cranial nerves II through XII are intact.   Skin: No rashes, petechiae, or bruising noted on exposed skin. Two dry mildly hyperpigmented patches noted on the right posterior lower leg.   Extremities: 1+ lower extremity edema bilaterally, compression stockings in place.    Laboratory Data:   11/10/2017 10:00   Sodium  "143   Potassium 3.9   Chloride 108   Carbon Dioxide 28   Urea Nitrogen 16   Creatinine 0.90   GFR Estimate 64   GFR Estimate If Black 78   Calcium 8.0 (L)   Anion Gap 7   Albumin 2.6 (L)   Protein Total 6.0 (L)   Bilirubin Total 0.3   Alkaline Phosphatase 82   ALT 30   AST 26   Glucose 107 (H)   WBC 8.9   Hemoglobin 9.0 (L)   Hematocrit 29.9 (L)   Platelet Count 215   RBC Count 3.42 (L)   MCV 87   MCH 26.3 (L)   MCHC 30.1 (L)   RDW 20.0 (H)   Diff Method Manual Differential   % Neutrophils 81.4   % Lymphocytes 4.4   % Monocytes 5.3   % Eosinophils 0.0   % Basophils 1.8   % Myelocytes 1.8   % Promyelocytes 5.3   Nucleated RBCs 3 (H)   Absolute Neutrophil 7.2   Absolute Lymphocytes 0.4 (L)   Absolute Monocytes 0.5   Absolute Eosinophils 0.0   Absolute Basophils 0.2   Absolute Myelocytes 0.2 (H)   Absolute Promyelocytes 0.5 (H)   Absolute Nucleated RBC 0.2   Poikilocytosis Slight   Polychromasia Moderate   Ovalocytes Slight   Microcytes Present     Assessment and Plan:    1. DLBCL, \"double-hit\"-like, currently on treatment with DA-R-EPOCH  Cycle 1 overall tolerated well other than mucositis and mild infusion reaction (rigors) to continuous infusion of chemotherapy. Interval CT CAP after 1 cycle due to elevated LD and uric acid showed response to treatment. She is thus far tolerating cycle 2 fairly well with some fatigue, nausea, and constipation. Her counts are improving. She will return twice weekly for lab checks. She will be admitted for cycle 3 on 11/16. She will have a PET/CT after cycle 4.     2. Sensation of body shaking.  Stable. Present since starting chemotherapy. Possibly a side effect. She declines a referral to neurology at this time. If worsens, will need to reconsider referral.     3. Heme  No transfusion needs today. Continue to monitor with twice weekly labs and transfuse if Hgb<8 and Plt<10k.     4. ID  Continue ppx ACV 400mg BID. Hold levaquin 250mg daily and fluconazole 200mg daily, as no longer " neutropenic.     5. Lower extremity edema with pain  Chronic condition likely exacerbated by steroids and excessive fluids. US on 10/21 negative for DVT bilaterally. Continue Lasix 20 mg daily. Continue pain medications as prescribed by palliative care. Given tip sheet on increasing protein in diet due to hypoalbuminemia, which may contribute to her leg swelling. Recommend pushing fluids with a goal of 64 oz/day.     6. History of stage 1, ER/ME+, HER2- breast cancer s/p bilateral mastectomies and BENJIE/BSO  Follows with Dr. Crawley. Oncotype recurrence score 11%. Was initially on Arimidex ([id number]) but changed to Tamoxifen due to arthralgias. Tamoxifen held since 10/5 and continue to hold with chemotherapy. F/u Dr. Crawley 11/27/17    7. History of papillary thyroid cancer, s/p total thyroidectomy  Follows with Dr. Castro. Has post surgical hypothyroidism. Continue levothyroxine and calcitriol as prescribed. Neck u/s on 11/2 shows no evidence of disease.    8. History of Rachael en Y gastric bypass  Continue supplements (calcium citrate-vitamin D, vitamin D3, magnesium citrate). Also has iron deficiency anemia likely from poor absorption and per Dr. Sauceda, she should continue 325mg PO ferrous sulfate TID.     9. Chronic chest wall pain s/p mastectomies  Follows with Dr. Benitez. Palliative care to continue to prescribe pain medications. Taking MS Contin, Oxycodone for breakthrough pain, and celebrex.     10. Constipation.  Under control with Colace and MiraLax, which she will continue. Recommend adding a fiber supplement to keep bowel regular.     11. Dry skin.  Recommend Eucerin cream to dry skin on legs.     Jessica Cox PA-C  [facility]  [address] [place] [address] [phone]

## 2017-11-10 NOTE — LETTER
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 She is currently undergoing treatment with DA-R-EPOCH. Her past medical history is significant for breast cancer in 2011 s/p bilateral mastectomies and BENJIE/BSO up until recently on Tamoxifen, papillary thyroid cancer s/p total thyroidectomy, chronic chest wall pain, and asthma. Briefly, her oncologic history is as follows:    She presented in 8/2017 with dramatically worse chest and back pain. CT 9/1/17 showed lytic lesion right 10th rib extending to right T9-10 neural foramen with vertebral body invasion. There was associated conglomerate of lymphadenopathy around the mid T-spine. She had a CT guided biopsy showing B-cell high grade lymphoma. Pathology showed \"The morphology shows a population of large cells, diffuse lymphoid infiltrate. The cells are atypical with abundant mitotic and apoptotic activity and single cell necrosis. Tumor is CD45 and CD79a positive and focally weakly CD30 positive. The other stains revealed positivity for CD20, BCL6, BCL2 and MUM1, and CD10 and CD21 negative. The CD5 and CD3 highlighted background T-cells.  MYC expression was equivocal with approximately 40% nuclei staining.  Ki-67 proliferative index is greater than 90-95%. The immunohistochemistry is suggestive of non-GCB immunophenotype of diffuse large B-cell lymphoma. The cytogenetics did not show rearrangement of the BCL2 or c-MYC. There is the presence of BCL6 rearrangement in 78% of cells and gains of MYC and BCL2, but no amplifications.\"     Staging LP and BMBx were negative for lymphoma. She started DA-REPOCH 10/6/17. She did well with chemo other than rigors during CIVI. D/c 10/11/17. Given Neulasta 10/12/17. Post cycle 1 " complicated by mucositis and worsening of chronic LE peripheral edema. She began cycle 2 on 10/27/17. Due to a rise in her LD and uric acid levels on that admission day, she underwent a CT scan which showed response to therapy with improvement in her mediastinal lymphadenopathy and right chest wall mass. She comes in today for routine follow up.     Interval History:  Patient reports that she has felt more tired this week. She has also noticed some dizziness with standing. She is drinking at least 32 ounces of fluid a day. She does have some abdominal pain with eating. She reports alternating with constipation and diarrhea. She is currently more constipated. She is taking 2 Colace a day and MiraLAX once a day. She has had headaches this week. She denies any fevers. She reports one day with more significant central chest pain that has since improved. For her headaches she found relief from Imitrex and Tylenol. She continues to feel general shakiness of her body but seems worse when she is active. She feels this is chemotherapy related. She continues on 20 mg of Lasix a day and her leg swelling remains unchanged. She continues to use compression stockings. She has noticed an area of dark her skin on her right leg and wonders about this. She denies other concerns.     Current Outpatient Prescriptions   Medication Sig Dispense Refill     prochlorperazine (COMPAZINE) 10 MG tablet Take 1 tablet (10 mg) by mouth every 6 hours as needed for nausea or vomiting 60 tablet 3     ENOVARX-LIDOCAINE HCL 10 % CREA Externally apply 1 g topically 3 times daily as needed 60 g 3     fluconazole (DIFLUCAN) 200 MG tablet Take 1 tablet (200 mg) by mouth daily 30 tablet 0     cetirizine (ZYRTEC ALLERGY) 10 MG tablet Take 1 tablet (10 mg) by mouth 3 times daily On hold for lab test. 30 tablet 0     acyclovir (ZOVIRAX) 400 MG tablet Take 1 tablet (400 mg) by mouth 2 times daily 60 tablet 0     dexamethasone (DECADRON) 4 MG tablet Take 8 mg  (2 tab) by mouth 11/1 and 11/2, then 4 mg (1 tab) by mouth 11/3. 5 tablet 0     diclofenac (VOLTAREN) 1 % GEL topical gel Apply affected area two times daily PRN using enclosed dosing card. 100 g 0     furosemide (LASIX) 20 MG tablet Take 2 tablets (40 mg) by mouth daily for 2 days 4 tablet 0     levofloxacin (LEVAQUIN) 250 MG tablet Take 1 tablet (250 mg) by mouth daily 30 tablet 0     allopurinol (ZYLOPRIM) 300 MG tablet Take 1 tablet (300 mg) by mouth daily 30 tablet 0     ferrous sulfate (IRON) 325 (65 FE) MG tablet Take 1 tablet (325 mg) by mouth 3 times daily (with meals) 90 tablet 0     bisacodyl (DULCOLAX) 5 MG EC tablet Take 3 tablets (15 mg) by mouth daily as needed for constipation 30 tablet 0     docusate sodium (COLACE) 100 MG tablet Take 100 mg by mouth daily 30 tablet 0     polyethylene glycol (MIRALAX) powder Take 17 g (1 capful) by mouth daily 510 g 0     magic mouthwash suspension (diphenhydrAMINE, lidocaine, aluminum-magnesium & simethicone) Swish and spit 10 mLs in mouth every 6 hours as needed for mouth sores 360 mL 1     cyclobenzaprine (FLEXERIL) 10 MG tablet Take 1 tablet (10 mg) by mouth 3 times daily as needed 90 tablet 0     senna-docusate (SENOKOT-S;PERICOLACE) 8.6-50 MG per tablet Take 1-2 tablets by mouth 2 times daily as needed for constipation 60 tablet 0     oxyCODONE (ROXICODONE) 30 MG IR tablet Take 1 tablet (30 mg) by mouth every 4 hours as needed for moderate to severe pain 180 tablet 0     morphine (MS CONTIN) 30 MG 12 hr tablet Take 1 tablet (30 mg) by mouth every 8 hours . Take an addition pill at night such that your evening dose is 60mg 120 tablet 0     UNABLE TO FIND Take 2 capsules by mouth 3 times daily Muscle Mag. 2 caps contain B1 20mg, B2 20mg, B6 10mg, magesium 20mg, manganese 2mg.       Morphine Sulfate (MS CONTIN PO) Take 60 mg by mouth daily Takes at 8pm       lidocaine-prilocaine (EMLA) cream Apply topically as needed (port access pain.) 30 g 1     SUMAtriptan  (IMITREX) 50 MG tablet Take 1 tablet (50 mg) by mouth at onset of headache for migraine (may repeat in 2 hours as needed, max 2 tablets daily) 9 tablet 3     calcitRIOL (ROCALTROL) 0.25 MCG capsule TAKE 2 CAPSULES BY MOUTH EVERY MORNING AND TAKE 1 CAPSULE BY MOUTH EVERY NIGHT AT BEDTIME (Patient taking differently: TAKE 2 CAPSULES BY MOUTH EVERY MORNING AND TAKE 1 CAPSULE BY MOUTH at noon) 270 capsule 3     celecoxib (CELEBREX) 200 MG capsule TAKE ONE CAPSULE BY MOUTH TWICE DAILY  56 capsule 0     order for DME Compression stockings, medium grade, please measure and fit. Please dispense a pair. 1 Units 0     diphenhydrAMINE (BENADRYL) 50 MG capsule Take 1 capsule (50 mg) by mouth nightly as needed for itching or sleep (Patient not taking: Reported on 11/3/2017) 56 capsule      methocarbamol (ROBAXIN) 750 MG tablet Take 1 tablet (750 mg) by mouth 4 times daily as needed . Ok to take a 5th Robaxin on very severe days. (Patient taking differently: Take 750 mg by mouth 4 times daily . Ok to take a 5th Robaxin on very severe days.) 360 tablet 1     ondansetron (ZOFRAN-ODT) 8 MG ODT tab Take 1 tablet (8 mg) by mouth every 8 hours as needed for nausea or vomiting 90 tablet 3     COMPOUND CONTAINING CONTROLLED SUBSTANCE (CMPD RX) - PHARMACY TO MIX COMPOUNDED MEDICATION Apply small amount to affected area two times daily. (Patient not taking: Reported on 11/3/2017) 120 g 6     diazepam (VALIUM) 5 MG tablet Take 1 tablet (5 mg) by mouth 3 times daily as needed For muscle spasms (Patient taking differently: Take 5 mg by mouth 3 times daily For muscle spasms) 90 tablet 2     levothyroxine (SYNTHROID/LEVOTHROID) 112 MCG tablet Mon-Sat: (2 tabs daily) Sunday: 3 tabs (Patient taking differently: 112 mcg Mon-Sat: (2 tabs daily) Sunday: 3 tabs) 189 tablet 3     hydrochlorothiazide (MICROZIDE) 12.5 MG capsule Take 1 capsule (12.5 mg) by mouth daily 90 capsule 2     montelukast (SINGULAIR) 10 MG tablet TAKE TWO TABLETS BY MOUTH TWICE  DAILY 120 tablet 11     EPINEPHrine (EPIPEN 2-CARIDAD) 0.3 MG/0.3ML injection Inject 0.3 mLs (0.3 mg) into the muscle once as needed for anaphylaxis 0.6 mL 3     potassium chloride SA (POTASSIUM CHLORIDE) 20 MEQ CR tablet Take 1 tablet (20 mEq) by mouth daily 90 tablet 3     VENTOLIN  (90 BASE) MCG/ACT Inhaler INHALE 2 PUFFS INTO THE LUNGS EVERY 4 HOURS AS NEEDED FOR SHORTNESS OF BREATH OR DIFFICULT BREATHING OR WHEEZING (Patient not taking: Reported on 11/3/2017) 18 g 3     cromolyn (OPTICROM) 4 % ophthalmic solution Place 1 drop into both eyes 4 times daily 10 mL 5     NASALCROM 5.2 MG/ACT AERS Inhaler SPRAY ONE SPRAY( 1 ML) IN NOSTRIL DAILY 1 Bottle 6     Cholecalciferol (VITAMIN D3) 2000 UNITS CAPS Take 5,000 Units by mouth daily Takes 2 tabs daily 60 capsule 4     order for DME Equipment being ordered: compression stockings. 20-30 mm or 30 - 40 mm as patient can tolerate. Physical therapy to determine if they should be above or below the knee. 2 Units 4     order for DME Equipment being ordered: compression bra (Patient not taking: Reported on 11/3/2017) 2 Device 1     ranitidine (ZANTAC) 75 MG tablet Take 1 tablet (75 mg) by mouth 3 times daily 270 tablet 3     aluminum chloride (DRYSOL) 20 % external solution Apply topically At Bedtime (Patient not taking: Reported on 11/3/2017) 60 mL 3     tacrolimus (PROTOPIC) 0.1 % ointment Apply topically 2 times daily (Patient not taking: Reported on 11/3/2017) 60 g 3     triamcinolone (KENALOG) 0.025 % ointment Apply topically 2 times daily Mix with 1 lb jar of vaseline or aquaphor (Patient not taking: Reported on 11/3/2017) 80 g 3     UNABLE TO FIND MEDICATION NAME: Tumeric 3 capsules daily (on hold during chemo)       mometasone (ASMANEX 30 METERED DOSES) 110 MCG/INH inhaler Inhale 1 puff into the lungs daily (Patient not taking: Reported on 11/3/2017) 3 Inhaler 3     UNABLE TO FIND 2 tablets 3 times daily MEDICATION NAME: Natural D-Hist (on hold during chemo)        calcium citrate-vitamin D (CALCIUM CITRATE +D) 315-250 MG-UNIT TABS Take 2 tablets by mouth 3 times daily 120 tablet      calcium carbonate (TUMS) 500 MG chewable tablet Take 2 tablets (1,000 mg) by mouth nightly as needed for other (cramps) 180 chew tab 3     UNABLE TO FIND 3 tablets 3 times daily MEDICATION NAME: calcium D-Glucarate   3 caps contain 180mg of elemental calcium.       Triamcinolone Acetonide (AZMACORT IN) Inhale 2 puffs into the lungs as needed       UNABLE TO FIND 2 tablets 3 times daily MEDICATION NAME: Digestzymes        UNABLE TO FIND 1 tablet daily MEDICATION NAME: Pure Encapsulations       Omega-3 Fatty Acids (OMEGA 3 PO) Take 5 mLs by mouth       Probiotic Product (PROBIOTIC DAILY PO) Take 1 capsule by mouth daily Lacto acid bifidobacterium       Physical Examination:  Vital Signs 11/10/2017   Systolic 102   Diastolic 62   Pulse 111   Temperature 98   Respirations 16   Weight (LB) 187 lb   O2 99     Wt Readings from Last 10 Encounters:   11/10/17 84.8 kg (187 lb)   11/06/17 83.3 kg (183 lb 9.6 oz)   11/03/17 82.6 kg (182 lb)   10/31/17 85 kg (187 lb 8 oz)   10/27/17 85.1 kg (187 lb 9.8 oz)   10/23/17 85.9 kg (189 lb 4.8 oz)   10/23/17 85.9 kg (189 lb 4.8 oz)   10/21/17 87.1 kg (192 lb)   10/19/17 87.1 kg (192 lb)   10/16/17 88.2 kg (194 lb 8 oz)   Constitutional: Well-appearing female in no acute distress.  Eyes: EOMI, PERRL. No scleral icterus.  ENT: Oral mucosa is moist without lesions or thrush.   Lymphatic: Neck is supple without cervical or supraclavicular lymphadenopathy.   Cardiovascular: Regular rate and rhythm.  Respiratory: Clear to auscultation bilaterally. No wheezes or crackles.  Gastrointestinal: Bowel sounds present. Abdomen soft, nontender.    Neurologic: Cranial nerves II through XII are intact.   Skin: No rashes, petechiae, or bruising noted on exposed skin. Two dry mildly hyperpigmented patches noted on the right posterior lower leg.   Extremities: 1+ lower extremity  "edema bilaterally, compression stockings in place.    Laboratory Data:   11/10/2017 10:00   Sodium 143   Potassium 3.9   Chloride 108   Carbon Dioxide 28   Urea Nitrogen 16   Creatinine 0.90   GFR Estimate 64   GFR Estimate If Black 78   Calcium 8.0 (L)   Anion Gap 7   Albumin 2.6 (L)   Protein Total 6.0 (L)   Bilirubin Total 0.3   Alkaline Phosphatase 82   ALT 30   AST 26   Glucose 107 (H)   WBC 8.9   Hemoglobin 9.0 (L)   Hematocrit 29.9 (L)   Platelet Count 215   RBC Count 3.42 (L)   MCV 87   MCH 26.3 (L)   MCHC 30.1 (L)   RDW 20.0 (H)   Diff Method Manual Differential   % Neutrophils 81.4   % Lymphocytes 4.4   % Monocytes 5.3   % Eosinophils 0.0   % Basophils 1.8   % Myelocytes 1.8   % Promyelocytes 5.3   Nucleated RBCs 3 (H)   Absolute Neutrophil 7.2   Absolute Lymphocytes 0.4 (L)   Absolute Monocytes 0.5   Absolute Eosinophils 0.0   Absolute Basophils 0.2   Absolute Myelocytes 0.2 (H)   Absolute Promyelocytes 0.5 (H)   Absolute Nucleated RBC 0.2   Poikilocytosis Slight   Polychromasia Moderate   Ovalocytes Slight   Microcytes Present     Assessment and Plan:    1. DLBCL, \"double-hit\"-like, currently on treatment with DA-R-EPOCH  Cycle 1 overall tolerated well other than mucositis and mild infusion reaction (rigors) to continuous infusion of chemotherapy. Interval CT CAP after 1 cycle due to elevated LD and uric acid showed response to treatment. She is thus far tolerating cycle 2 fairly well with some fatigue, nausea, and constipation. Her counts are improving. She will return twice weekly for lab checks. She will be admitted for cycle 3 on 11/16. She will have a PET/CT after cycle 4.     2. Sensation of body shaking.  Stable. Present since starting chemotherapy. Possibly a side effect. She declines a referral to neurology at this time. If worsens, will need to reconsider referral.     3. Heme  No transfusion needs today. Continue to monitor with twice weekly labs and transfuse if Hgb<8 and Plt<10k.     4. " ID  Continue ppx ACV 400mg BID. Hold levaquin 250mg daily and fluconazole 200mg daily, as no longer neutropenic.     5. Lower extremity edema with pain  Chronic condition likely exacerbated by steroids and excessive fluids. US on 10/21 negative for DVT bilaterally. Continue Lasix 20 mg daily. Continue pain medications as prescribed by palliative care. Given tip sheet on increasing protein in diet due to hypoalbuminemia, which may contribute to her leg swelling. Recommend pushing fluids with a goal of 64 oz/day.     6. History of stage 1, ER/TN+, HER2- breast cancer s/p bilateral mastectomies and BENJIE/BSO  Follows with Dr. Crawley. Oncotype recurrence score 11%. Was initially on Arimidex ([id number]) but changed to Tamoxifen due to arthralgias. Tamoxifen held since 10/5 and continue to hold with chemotherapy. F/u Dr. Crawley 11/27/17    7. History of papillary thyroid cancer, s/p total thyroidectomy  Follows with Dr. Castro. Has post surgical hypothyroidism. Continue levothyroxine and calcitriol as prescribed. Neck u/s on 11/2 shows no evidence of disease.    8. History of Rachael en Y gastric bypass  Continue supplements (calcium citrate-vitamin D, vitamin D3, magnesium citrate). Also has iron deficiency anemia likely from poor absorption and per Dr. Sauceda, she should continue 325mg PO ferrous sulfate TID.     9. Chronic chest wall pain s/p mastectomies  Follows with Dr. Benitez. Palliative care to continue to prescribe pain medications. Taking MS Contin, Oxycodone for breakthrough pain, and celebrex.     10. Constipation.  Under control with Colace and MiraLax, which she will continue. Recommend adding a fiber supplement to keep bowel regular.     11. Dry skin.  Recommend Eucerin cream to dry skin on legs.     Jessica Cox PA-C  [facility]  [address] [place] [address]  [phone]      Jessica Cox PA-C

## 2017-11-10 NOTE — MR AVS SNAPSHOT
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        November 2017 Sunday Monday Tuesday Wednesday Thursday Friday Saturday                  1     2     [facility]    4:00 PM   (15 min.)   [facility]   [facility] Lab Draw     [facility] INJECTION    4:15 PM   (30 min.)   Nurse,  Oncology Injection   [facility]     US HEAD NECK SOFT TISSUE    5:00 PM   (60 min.)   UCUS3   [facility] US     LAB    6:00 PM   (15 min.)    LAB   [facility] 3     [facility]    4:00 PM   (15 min.)   [facility]   [facility] Lab Draw     [facility] RETURN    4:25 PM   (50 min.)   Jessica Cox PA-C   [facility] 4       5     6     [facility]    6:30 AM   (15 min.)    [facility]   [facility] Lab Draw     [facility] ONC INFUSION 360    7:00 AM   (360 min.)    ONCOLOGY INFUSION   [facility] 7     8      9     10     [facility]    9:30 AM   (15 min.)    [facility]   [facility] Lab Draw     [facility] ONC INFUSION 360   10:00 AM   (360 min.)   [facility] ONCOLOGY INFUSION   [facility]     [facility] RETURN   11:45 AM   (50 min.)   Jessica Cox PA-C   [facility] 11       12     13     [facility]    4:00 PM   (15 min.)    [facility]   [facility] Lab Draw     [facility] RETURN ENDOCRINE    4:15 PM   (30 min.)   Avelina Castro MD   [facility] 14     15     16     [facility]    6:30 AM   (15 min.)    [facility]   [facility] Lab Draw     [facility] RETURN   12:15 PM   (50 min.)   Jessica Cox PA-C   [facility] 17     18       19     20     21     22     23     24     25       26     27     [facility] RETURN    3:45 PM   (30 min.)   Shahla Crawley MD   [facility] 28 29 30 December 2017 Sunday Monday Tuesday Wednesday Thursday Friday Saturday                            1     2       3     4     5     6     7     8     9       10     11     12     13     14     15     16       17     18     19     20     21     22     23       24     25     26     27     28     29     30       31                                               Recent Results (from the past 24 hour(s))   Blood component    Collection Time: 11/10/17  1:00 AM   Result Value Ref Range    Unit Number [id number]     Blood Component Type PlateletPheresis,LeukoRed Irrad (Part 3)     Division Number 00     Status of Unit Ready for patient 11/10/2017 0740     Blood Product Code [id number]     Unit Status KATHLEEN    ABO/Rh type and screen    Collection Time: 11/10/17  9:59 AM   Result Value Ref Range    Units Ordered 2     ABO O     RH(D) Pos     Antibody Screen Neg     Test Valid Only At          [facility]    Specimen Expires 11/13/2017      Crossmatch Red Blood Cells    Blood component    Collection Time: 11/10/17  9:59 AM   Result Value Ref Range    Unit Number [id number]     Blood Component Type Red Blood Cells Leukocyte Reduced     Division Number 00     Status of Unit IN-TRANSIT     Blood Product Code [id number]     Unit Status     Blood component    Collection Time: 11/10/17  9:59 AM   Result Value Ref Range    Unit Number [id number]     Blood Component Type Red Blood Cells Leukocyte Reduced     Division Number 00     Status of Unit IN-TRANSIT     Blood Product Code Y4637J96     Unit Status     Comprehensive metabolic panel    Collection Time: 11/10/17 10:00 AM   Result Value Ref Range    Sodium 143 133 - 144 mmol/L    Potassium 3.9 3.4 - 5.3 mmol/L    Chloride 108 94 - 109 mmol/L    Carbon Dioxide 28 20 - 32 mmol/L    Anion Gap 7 3 - 14 mmol/L    Glucose 107 (H) 70 - 99 mg/dL    Urea Nitrogen 16 7 - 30 mg/dL    Creatinine 0.90 0.52 - 1.04 mg/dL    GFR Estimate 64 >60 mL/min/1.7m2    GFR Estimate If Black 78 >60 mL/min/1.7m2    Calcium 8.0 (L) 8.5 - 10.1 mg/dL    Bilirubin Total 0.3 0.2 - 1.3 mg/dL    Albumin 2.6 (L) 3.4 - 5.0 g/dL    Protein Total 6.0 (L) 6.8 - 8.8 g/dL    Alkaline Phosphatase 82 40 - 150 U/L    ALT 30 0 - 50 U/L    AST 26 0 - 45 U/L   CBC with platelets differential    Collection Time: 11/10/17 10:00 AM   Result Value Ref Range    WBC 8.9 4.0 - 11.0 10e9/L    RBC Count 3.42 (L) 3.8 - 5.2 10e12/L    Hemoglobin 9.0 (L) 11.7 - 15.7 g/dL    Hematocrit 29.9 (L) 35.0 - 47.0 %    MCV 87 78 - 100 fl    MCH 26.3 (L) 26.5 - 33.0 pg    MCHC 30.1 (L) 31.5 - 36.5 g/dL    RDW 20.0 (H) 10.0 - 15.0 %    Platelet Count 215 150 - 450 10e9/L    Diff Method Manual Differential     % Neutrophils 81.4 %    % Lymphocytes 4.4 %    % Monocytes 5.3 %    % Eosinophils 0.0 %    % Basophils 1.8 %    % Myelocytes 1.8 %    % Promyelocytes 5.3 %    Nucleated RBCs 3 (H) 0 /100    Absolute Neutrophil 7.2 1.6 - 8.3 10e9/L    Absolute Lymphocytes 0.4 (L) 0.8 -  5.3 10e9/L    Absolute Monocytes 0.5 0.0 - 1.3 10e9/L    Absolute Eosinophils 0.0 0.0 - 0.7 10e9/L    Absolute Basophils 0.2 0.0 - 0.2 10e9/L    Absolute Myelocytes 0.2 (H) 0 10e9/L    Absolute Promyeloctyes 0.5 (H) 0 10e9/L    Absolute Nucleated RBC 0.2     Poikilocytosis Slight     Polychromasia Moderate     Ovalocytes Slight     Microcytes Present                Follow-ups after your visit        Your next 10 appointments already scheduled     Nov 13, 2017  4:00 PM CST   [facility] with  [facility]   [facility])    [address] [place] [address]  [address] [facility] [address]   [phone]            Nov 13, 2017  4:30 PM CST   (Arrive by 4:15 PM)   RETURN ENDOCRINE with Avelina Castro MD   [facility] ([facility])    [address] [place] [address]  [address] [facility] [address]   [phone]            Nov 16, 2017  6:30 AM CST   [facility] with  [facility]   [facility]    [address] [place] [address]   [phone]            Nov 16, 2017 12:30 PM CST   (Arrive by 12:15 PM)   Return Visit with Jessica Cox PA-C   [facility]    [address] [place] [address]   [phone]            Nov 27, 2017  4:00 PM CST   (Arrive by 3:45 PM)   Return Visit with Shahla Crawley MD   [facility]    [address] [place] [id number] [phone]              Future tests that were ordered for you today     Open Standing Orders        Priority Remaining Interval Expires Ordered    Red blood cell prepare order unit Routine 99/100 CONDITIONAL (SPECIFY) BLOOD  11/9/2017    Platelets prepare order unit Routine 99/100 CONDITIONAL (SPECIFY) BLOOD  11/9/2017             Who to contact     If you have questions or need follow up information about today's clinic visit or your schedule please contact [facility] directly at [phone].  Normal or non-critical lab and imaging results will be communicated to you by [employer], letter or phone within 4 business days after the clinic has received the results. If you do not hear from us within 7 days, please contact the clinic through [employer] or phone. If you have a critical or abnormal lab result, we will notify you by phone as soon as possible.  Submit refill requests through [employer] or call your pharmacy and they will forward the refill request to us. Please allow 3 business days for your refill to be completed.          Additional Information About Your Visit        [employer] Information     [employer] gives you secure access to your electronic health record. If you see a primary care provider, you can also send messages to your care team and make appointments. If you have questions, please call your primary care clinic.  If you do not have a primary care provider, please call [phone] and they will assist you.        Care EveryWhere ID     This is your Care EveryWhere ID. This could be used by other organizations to access your [facility] medical records  [id number]        Your Vitals Were     Pulse Temperature Respirations Pulse Oximetry BMI (Body Mass Index)       111 98  F (36.7  C) 16 99% 31.12 kg/m2        Blood Pressure from Last 3 Encounters:   11/10/17 102/62   11/06/17 107/69   11/03/17 124/81    Weight from Last 3 Encounters:   11/10/17 84.8 kg (187 lb)   11/06/17 83.3 kg (183 lb 9.6 oz)   11/03/17 82.6 kg (182 lb)              We Performed the Following     ABO/Rh type and screen     Blood component     Blood component     Blood component     CBC with platelets differential     Comprehensive metabolic panel     Platelets prepare order unit          Today's Medication Changes          These changes  are accurate as of: 11/10/17 12:13 PM.  If you have any questions, ask your nurse or doctor.               These medicines have changed or have updated prescriptions.        Dose/Directions    calcitRIOL 0.25 MCG capsule   Commonly known as:  ROCALTROL   This may have changed:  additional instructions   Used for:  Postsurgical hypothyroidism, Papillary carcinoma, follicular variant (H), Metastasis to cervical lymph node (H)        TAKE 2 CAPSULES BY MOUTH EVERY MORNING AND TAKE 1 CAPSULE BY MOUTH EVERY NIGHT AT BEDTIME   Quantity:  270 capsule   Refills:  3       diazepam 5 MG tablet   Commonly known as:  VALIUM   This may have changed:    - when to take this  - additional instructions   Used for:  Chest wall pain        Dose:  5 mg   Take 1 tablet (5 mg) by mouth 3 times daily as needed For muscle spasms   Quantity:  90 tablet   Refills:  2       levothyroxine 112 MCG tablet   Commonly known as:  SYNTHROID/LEVOTHROID   This may have changed:    - how much to take  - additional instructions   Used for:  Postsurgical hypothyroidism, Papillary carcinoma, follicular variant (H), Postsurgical hypoparathyroidism (H), Thyroid cancer (H)        Mon-Sat: (2 tabs daily) Sunday: 3 tabs   Quantity:  189 tablet   Refills:  3       methocarbamol 750 MG tablet   Commonly known as:  ROBAXIN   This may have changed:    - when to take this  - additional instructions   Used for:  Myofascial pain        Dose:  750 mg   Take 1 tablet (750 mg) by mouth 4 times daily as needed . Ok to take a 5th Robaxin on very severe days.   Quantity:  360 tablet   Refills:  1                Primary Care Provider Office Phone # Fax #    Shahla Raul Crawley [phone]       [address] [place] [address]        Equal Access to Services     [facility]: Kevin Venegas, girish ge, qaybranjan meier. [facility] [phone].    ATENCIÓN: Sandy mccollum,  tiene a stiles disposición servicios gratuitos de asistencia lingüística. Oneil prince [phone].    We comply with applicable federal civil rights laws and [place] laws. We do not discriminate on the basis of race, color, national origin, age, disability, sex, sexual orientation, or gender identity.            Thank you!     Thank you for choosing [facility]  for your care. Our goal is always to provide you with excellent care. Hearing back from our patients is one way we can continue to improve our services. Please take a few minutes to complete the written survey that you may receive in the mail after your visit with us. Thank you!             Your Updated Medication List - Protect others around you: Learn how to safely use, store and throw away your medicines at www.disposemymeds.org.          This list is accurate as of: 11/10/17 12:13 PM.  Always use your most recent med list.                   Brand Name Dispense Instructions for use Diagnosis    acyclovir 400 MG tablet    ZOVIRAX    60 tablet    Take 1 tablet (400 mg) by mouth 2 times daily    High grade B-cell lymphoma (H)       allopurinol 300 MG tablet    ZYLOPRIM    30 tablet    Take 1 tablet (300 mg) by mouth daily    High grade B-cell lymphoma (H)       aluminum chloride 20 % external solution    DRYSOL    60 mL    Apply topically At Bedtime    Rash, Intertrigo       AZMACORT IN      Inhale 2 puffs into the lungs as needed        bisacodyl 5 MG EC tablet     30 tablet    Take 3 tablets (15 mg) by mouth daily as needed for constipation    Constipation, unspecified constipation type       calcitRIOL 0.25 MCG capsule    ROCALTROL    270 capsule    TAKE 2 CAPSULES BY MOUTH EVERY MORNING AND TAKE 1 CAPSULE BY MOUTH EVERY NIGHT AT BEDTIME    Postsurgical hypothyroidism, Papillary carcinoma, follicular variant (H), Metastasis to cervical lymph node (H)       CALCIUM CITRATE +D 315-250 MG-UNIT Tabs per tablet   Generic drug:  calcium  citrate-vitamin D     120 tablet    Take 2 tablets by mouth 3 times daily        celecoxib 200 MG capsule    celeBREX    56 capsule    TAKE ONE CAPSULE BY MOUTH TWICE DAILY    Chest wall pain       cetirizine 10 MG tablet    ZYRTEC ALLERGY    30 tablet    Take 1 tablet (10 mg) by mouth 3 times daily On hold for lab test.    High grade B-cell lymphoma (H)       COMPOUND CONTAINING CONTROLLED SUBSTANCE - PHARMACY TO MIX COMPOUNDED MEDICATION    CMPD RX    120 g    Apply small amount to affected area two times daily.    Neoplasm related pain       cromolyn 4 % ophthalmic solution    OPTICROM    10 mL    Place 1 drop into both eyes 4 times daily    Idiopathic mast cell activation syndrome (H)       cyclobenzaprine 10 MG tablet    FLEXERIL    90 tablet    Take 1 tablet (10 mg) by mouth 3 times daily as needed    High grade B-cell lymphoma (H)       dexamethasone 4 MG tablet    DECADRON    5 tablet    Take 8 mg (2 tab) by mouth 11/1 and 11/2, then 4 mg (1 tab) by mouth 11/3.    High grade B-cell lymphoma (H)       diazepam 5 MG tablet    VALIUM    90 tablet    Take 1 tablet (5 mg) by mouth 3 times daily as needed For muscle spasms    Chest wall pain       diclofenac 1 % Gel topical gel    VOLTAREN    100 g    Apply affected area two times daily PRN using enclosed dosing card.    Myofascial pain       diphenhydrAMINE 50 MG capsule    BENADRYL    56 capsule    Take 1 capsule (50 mg) by mouth nightly as needed for itching or sleep    High grade B-cell lymphoma (H)       docusate sodium 100 MG tablet    COLACE    30 tablet    Take 100 mg by mouth daily    Acute constipation       ENOVARX-LIDOCAINE HCL 10 % Crea     60 g    Externally apply 1 g topically 3 times daily as needed    Chest wall pain       EPINEPHrine 0.3 MG/0.3ML injection 2-pack    EPIPEN 2-CARIDAD    0.6 mL    Inject 0.3 mLs (0.3 mg) into the muscle once as needed for anaphylaxis        ferrous sulfate 325 (65 FE) MG tablet    IRON    90 tablet    Take 1 tablet  (325 mg) by mouth 3 times daily (with meals)    Status post bariatric surgery       fluconazole 200 MG tablet    DIFLUCAN    30 tablet    Take 1 tablet (200 mg) by mouth daily    High grade B-cell lymphoma (H)       furosemide 20 MG tablet    LASIX    4 tablet    Take 2 tablets (40 mg) by mouth daily for 2 days    High grade B-cell lymphoma (H)       hydrochlorothiazide 12.5 MG capsule    MICROZIDE    90 capsule    Take 1 capsule (12.5 mg) by mouth daily    Hypocalcemia       levofloxacin 250 MG tablet    LEVAQUIN    30 tablet    Take 1 tablet (250 mg) by mouth daily    High grade B-cell lymphoma (H)       levothyroxine 112 MCG tablet    SYNTHROID/LEVOTHROID    189 tablet    Mon-Sat: (2 tabs daily) Sunday: 3 tabs    Postsurgical hypothyroidism, Papillary carcinoma, follicular variant (H), Postsurgical hypoparathyroidism (H), Thyroid cancer (H)       lidocaine visc 2% 2.5mL/5mL & maalox/mylanta w/ simeth 2.5mL/5mL & diphenhydrAMINE 5mg/5mL Susp suspension    [facility]    360 mL    Swish and spit 10 mLs in mouth every 6 hours as needed for mouth sores    Stomatitis and mucositis, High grade B-cell lymphoma (H)       lidocaine-prilocaine cream    EMLA    30 g    Apply topically as needed (port access pain.)    Neoplasm related pain, Chest wall pain       methocarbamol 750 MG tablet    ROBAXIN    360 tablet    Take 1 tablet (750 mg) by mouth 4 times daily as needed . Ok to take a 5th Robaxin on very severe days.    Myofascial pain       mometasone 110 MCG/INH inhaler    ASMANEX 30 METERED DOSES    3 Inhaler    Inhale 1 puff into the lungs daily    Intermittent asthma, uncomplicated       montelukast 10 MG tablet    SINGULAIR    120 tablet    TAKE TWO TABLETS BY MOUTH TWICE DAILY    Idiopathic mast cell activation syndrome (H)       * MS CONTIN PO      Take 60 mg by mouth daily Takes at 8pm        * morphine 30 MG 12 hr tablet    MS CONTIN    120 tablet    Take 1 tablet (30 mg) by mouth every 8 hours .  Take an addition pill at night such that your evening dose is 60mg    Neoplasm related pain       NASALCROM 5.2 MG/ACT Aers Inhaler   Generic drug:  cromolyn sodium     1 Bottle    SPRAY ONE SPRAY( 1 ML) IN NOSTRIL DAILY    Mast cell disease, systemic       OMEGA 3 PO      Take 5 mLs by mouth        ondansetron 8 MG ODT tab    ZOFRAN ODT    90 tablet    Take 1 tablet (8 mg) by mouth every 8 hours as needed for nausea or vomiting    Nausea with vomiting       * order for DME     2 Units    Equipment being ordered: compression stockings. 20-30 mm or 30 - 40 mm as patient can tolerate. Physical therapy to determine if they should be above or below the knee.    Venous stasis       * order for DME     2 Device    Equipment being ordered: compression bra    Malignant neoplasm of right female breast, unspecified site of breast       * order for DME     1 Units    Compression stockings, medium grade, please measure and fit. Please dispense a pair.    Peripheral edema       oxyCODONE IR 30 MG tablet    ROXICODONE    180 tablet    Take 1 tablet (30 mg) by mouth every 4 hours as needed for moderate to severe pain    High grade B-cell lymphoma (H)       polyethylene glycol powder    MIRALAX    510 g    Take 17 g (1 capful) by mouth daily    Acute constipation       potassium chloride SA 20 MEQ CR tablet    KLOR-CON    90 tablet    Take 1 tablet (20 mEq) by mouth daily    Hypokalemia       PROBIOTIC DAILY PO      Take 1 capsule by mouth daily Lacto acid bifidobacterium    Breast cancer, unspecified laterality, Thyroid cancer (H), Chronic arthralgias of knees and hips, unspecified laterality       prochlorperazine 10 MG tablet    COMPAZINE    60 tablet    Take 1 tablet (10 mg) by mouth every 6 hours as needed for nausea or vomiting    High grade B-cell lymphoma (H), Nausea       ranitidine 75 MG tablet    ZANTAC    270 tablet    Take 1 tablet (75 mg) by mouth 3 times daily    Mast cell disease, systemic       senna-docusate  8.6-50 MG per tablet    SENOKOT-S;PERICOLACE    60 tablet    Take 1-2 tablets by mouth 2 times daily as needed for constipation    Acute constipation       SUMAtriptan 50 MG tablet    IMITREX    9 tablet    Take 1 tablet (50 mg) by mouth at onset of headache for migraine (may repeat in 2 hours as needed, max 2 tablets daily)    Migraine without status migrainosus, not intractable, unspecified migraine type       tacrolimus 0.1 % ointment    PROTOPIC    60 g    Apply topically 2 times daily    Rash, Intertrigo       triamcinolone 0.025 % ointment    KENALOG    80 g    Apply topically 2 times daily Mix with 1 lb jar of vaseline or aquaphor    Intertrigo, Rash       TUMS 500 MG chewable tablet   Generic drug:  calcium carbonate     180 chew tab    Take 2 tablets (1,000 mg) by mouth nightly as needed for other (cramps)        * UNABLE TO FIND      3 tablets 3 times daily MEDICATION NAME: calcium D-Glucarate  3 caps contain 180mg of elemental calcium.        * UNABLE TO FIND      2 tablets 3 times daily MEDICATION NAME: Natural D-Hist (on hold during chemo)    Breast cancer, unspecified laterality, Papillary carcinoma, follicular variant (H), Chronic musculoskeletal pain       * UNABLE TO FIND      MEDICATION NAME: Tumeric 3 capsules daily (on hold during chemo)        * UNABLE TO FIND      Take 2 capsules by mouth 3 times daily Muscle Mag. 2 caps contain B1 20mg, B2 20mg, B6 10mg, magesium 20mg, manganese 2mg.        * UNABLE TO FIND      2 tablets 3 times daily MEDICATION NAME: Digestzymes    Thyroid cancer (H), Postsurgical hypothyroidism, Postsurgical hypoparathyroidism (H)       * UNABLE TO FIND      1 tablet daily MEDICATION NAME: Pure Encapsulations    Thyroid cancer (H), Postsurgical hypothyroidism, Postsurgical hypoparathyroidism (H)       VENTOLIN  (90 BASE) MCG/ACT Inhaler   Generic drug:  albuterol     18 g    INHALE 2 PUFFS INTO THE LUNGS EVERY 4 HOURS AS NEEDED FOR SHORTNESS OF BREATH OR DIFFICULT  BREATHING OR WHEEZING    Mild intermittent asthma without complication       vitamin D3 2000 UNITS Caps     60 capsule    Take 5,000 Units by mouth daily Takes 2 tabs daily    Thyroid cancer (H), Postsurgical hypothyroidism, Papillary carcinoma, follicular variant (H), Postsurgical hypoparathyroidism (H)       * Notice:  This list has 11 medication(s) that are the same as other medications prescribed for you. Read the directions carefully, and ask your doctor or other care provider to review them with you.

## 2017-11-10 NOTE — PATIENT INSTRUCTIONS
Increase fluid intake to 64 oz/day.   Start a fiber supplement daily (Metamucil, Fibercon or fiber gummies).  Increase protein in diet to help with leg swelling.   Hold fluconazole and levofloxacin for now.   Apply Eucerin cream to dry skin on legs.     Tips to Increase the Protein in Your Diet  You may need more protein in your diet to help you heal from an illness, surgery or wound. Extra protein can also help you gain weight. Here are some ideas for adding high-protein foods to your meals.  Meat and fish    Add chopped cooked meat to vegetables, salads, casseroles, soups, sauces and biscuit dough.    Use in omelets, soufflés, quiches, sandwich fillings and chicken or turkey stuffing.    Wrap in pie crust or biscuit dough to make a turnover.    Add to stuffed baked potatoes.    Make a dip with diced meat or flaked fish mixed with sour cream and spices.  Chopped, hard-cooked eggs    Add to salads.    Use for snacks and sandwich filling.  High-protein milk  To make high-protein milk, mix 1 quart whole milk with 1 cup powdered milk.    Add to cream soups, mashed potatoes, scrambled eggs, cereals and dried eggnog mix.    Use as an ingredient in puddings, custards, hot chocolate, milk shakes and pancakes.  Powdered milk    If you don't have any high-protein milk on hand, you can use powdered milk. Add 3 tablespoons to:    gravies, sauces, cream soups, mayonnaise    casseroles, meat patties, meatloaf, tuna salad, deviled ham    scalloped or mashed potatoes, creamed spinach    scrambled eggs, egg salad    cereals    yogurt, milk drinks, ice cream, frozen desserts, puddings, custards.    Add 4 to 6 tablespoons powdered milk to make:    cream sauces    breads, muffins, pancakes, waffles, cookies, cakes    cream pies, frostings, cake fillings    fruit cobblers, bread or rice pudding, gelatin desserts.    For high-protein eggnog, add 3 to 6 tablespoons powdered milk to prepared eggnog.  Hard or soft cheese    Melt on  sandwiches, breads, tortillas, hamburgers, hot dogs, other meats, vegetables, eggs and pies.    Grate into soups, chili, sauces, casseroles, vegetables, potatoes, rice, noodles or meatloaf.    Eat with toast or crackers, or melt for torres dip.  Cottage cheese or ricotta cheese    Mix with or scoop on top of fruits and vegetables.    Add to casseroles, lasagna, spaghetti, noodles and egg dishes (omelets, scrambled eggs, soufflés).    Use in gelatin, pudding-type desserts, cheesecake and pancake batter.    Use to stuff crepes, pasta shells or manicotti.  Fruit yogurt    Blend with fruits for a fruit smoothie.    Use as a dip for fruits and vegetables.    Scoop on top of pancakes or waffles.  Tofu    Blend silken tofu with fruits and juices for a smoothie.    Add chunks of firm tofu to soups and stews, or crumble into meatloaf.    Blend dried onion soup mix into soft or silken tofu for dip.    Use pureed silken tofu for part of the mayonnaise, sour cream, cream cheese or ricotta cheese called for in recipes.  Beans    Use cooked beans or peas in soups, casseroles, pasta, tacos and burritos.  Nuts and seeds  Note: Avoid in children under age 3.    Use in casseroles, breads, muffins, pancakes, cookies and waffles.    Sprinkle on fruits, cereals, ice cream, yogurt, vegetables and salads.    Mix with raisins, dried fruits and chocolate chips for a snack.  Nut butters  Note: Avoid in children under age 3.    Spread on sandwiches, toast, muffins, crackers, waffles, pancakes and fruit slices.    Use as a dip for raw vegetables.    Blend with milk drinks, or swirl through ice cream, yogurt or hot cereal.  Nutrition supplements (nutrition bars, drinks and powders)    Add powders to milk drinks and desserts.    Mix with ice cream, milk and fruit for a high-protein milk shake.  Children under age 3 should avoid seeds, nuts, nut butters and hard pieces of fruit.  For informational purposes only. Not to replace the advice of your  health care provider.  Copyright   2005 [facility]. All rights reserved. [employer] 509238 - REV 02/16.

## 2017-11-13 ENCOUNTER — OFFICE VISIT (OUTPATIENT)
Dept: ENDOCRINOLOGY | Facility: CLINIC | Age: 57
End: 2017-11-13

## 2017-11-13 VITALS
WEIGHT: 188 LBS | SYSTOLIC BLOOD PRESSURE: 125 MMHG | BODY MASS INDEX: 31.28 KG/M2 | HEART RATE: 69 BPM | DIASTOLIC BLOOD PRESSURE: 74 MMHG

## 2017-11-13 DIAGNOSIS — C85.10 HIGH GRADE B-CELL LYMPHOMA (H): ICD-10-CM

## 2017-11-13 DIAGNOSIS — E89.0 POSTSURGICAL HYPOTHYROIDISM: ICD-10-CM

## 2017-11-13 DIAGNOSIS — C73 PAPILLARY CARCINOMA, FOLLICULAR VARIANT (H): Primary | ICD-10-CM

## 2017-11-13 DIAGNOSIS — E83.51 HYPOCALCEMIA: ICD-10-CM

## 2017-11-13 LAB
ALBUMIN SERPL-MCNC: 2.7 G/DL (ref 3.4–5)
ALP SERPL-CCNC: 102 U/L (ref 40–150)
ALT SERPL W P-5'-P-CCNC: 29 U/L (ref 0–50)
ANION GAP SERPL CALCULATED.3IONS-SCNC: 9 MMOL/L (ref 3–14)
ANISOCYTOSIS BLD QL SMEAR: ABNORMAL
AST SERPL W P-5'-P-CCNC: 27 U/L (ref 0–45)
BASOPHILS # BLD AUTO: 0.1 10E9/L (ref 0–0.2)
BASOPHILS NFR BLD AUTO: 0.9 %
BILIRUB SERPL-MCNC: 0.2 MG/DL (ref 0.2–1.3)
BUN SERPL-MCNC: 13 MG/DL (ref 7–30)
CALCIUM SERPL-MCNC: 8.1 MG/DL (ref 8.5–10.1)
CHLORIDE SERPL-SCNC: 107 MMOL/L (ref 94–109)
CO2 SERPL-SCNC: 28 MMOL/L (ref 20–32)
CREAT SERPL-MCNC: 0.99 MG/DL (ref 0.52–1.04)
DIFFERENTIAL METHOD BLD: ABNORMAL
EOSINOPHIL # BLD AUTO: 0 10E9/L (ref 0–0.7)
EOSINOPHIL NFR BLD AUTO: 0 %
ERYTHROCYTE [DISTWIDTH] IN BLOOD BY AUTOMATED COUNT: 21.3 % (ref 10–15)
GFR SERPL CREATININE-BSD FRML MDRD: 58 ML/MIN/1.7M2
GLUCOSE SERPL-MCNC: 111 MG/DL (ref 70–99)
HCT VFR BLD AUTO: 32.2 % (ref 35–47)
HGB BLD-MCNC: 9.9 G/DL (ref 11.7–15.7)
LYMPHOCYTES # BLD AUTO: 0.5 10E9/L (ref 0.8–5.3)
LYMPHOCYTES NFR BLD AUTO: 6.1 %
MCH RBC QN AUTO: 27.2 PG (ref 26.5–33)
MCHC RBC AUTO-ENTMCNC: 30.7 G/DL (ref 31.5–36.5)
MCV RBC AUTO: 89 FL (ref 78–100)
METAMYELOCYTES # BLD: 0.2 10E9/L
METAMYELOCYTES NFR BLD MANUAL: 2.6 %
MONOCYTES # BLD AUTO: 0.4 10E9/L (ref 0–1.3)
MONOCYTES NFR BLD AUTO: 5.2 %
MYELOCYTES # BLD: 0.1 10E9/L
MYELOCYTES NFR BLD MANUAL: 1.7 %
NEUTROPHILS # BLD AUTO: 7 10E9/L (ref 1.6–8.3)
NEUTROPHILS NFR BLD AUTO: 81.8 %
OVALOCYTES BLD QL SMEAR: SLIGHT
PLATELET # BLD AUTO: 314 10E9/L (ref 150–450)
POIKILOCYTOSIS BLD QL SMEAR: SLIGHT
POLYCHROMASIA BLD QL SMEAR: SLIGHT
POTASSIUM SERPL-SCNC: 3.8 MMOL/L (ref 3.4–5.3)
PROMYELOCYTES # BLD MANUAL: 0.1 10E9/L
PROMYELOCYTES NFR BLD MANUAL: 1.7 %
PROT SERPL-MCNC: 6.2 G/DL (ref 6.8–8.8)
RBC # BLD AUTO: 3.64 10E12/L (ref 3.8–5.2)
SODIUM SERPL-SCNC: 143 MMOL/L (ref 133–144)
WBC # BLD AUTO: 8.5 10E9/L (ref 4–11)

## 2017-11-13 PROCEDURE — 85025 COMPLETE CBC W/AUTO DIFF WBC: CPT | Performed by: PHYSICIAN ASSISTANT

## 2017-11-13 PROCEDURE — 25000128 H RX IP 250 OP 636

## 2017-11-13 PROCEDURE — 80053 COMPREHEN METABOLIC PANEL: CPT | Performed by: PHYSICIAN ASSISTANT

## 2017-11-13 RX ORDER — HEPARIN SODIUM (PORCINE) LOCK FLUSH IV SOLN 100 UNIT/ML 100 UNIT/ML
5 SOLUTION INTRAVENOUS EVERY 8 HOURS PRN
Status: DISCONTINUED | OUTPATIENT
Start: 2017-11-13 | End: 2017-11-21 | Stop reason: HOSPADM

## 2017-11-13 RX ADMIN — SODIUM CHLORIDE, PRESERVATIVE FREE 5 ML: 5 INJECTION INTRAVENOUS at 16:42

## 2017-11-13 ASSESSMENT — PAIN SCALES - GENERAL: PAINLEVEL: NO PAIN (0)

## 2017-11-13 NOTE — MR AVS SNAPSHOT
After Visit Summary   11/13/2017    Tisha Arias    MRN: [id number]           Patient Information     Date Of Birth          1960        Visit Information        Provider Department      11/13/2017 4:30 PM Avelina Castro MD [facility]        Today's Diagnoses     Papillary carcinoma, follicular variant (H)    -  1    Postsurgical hypothyroidism        Hypocalcemia           Follow-ups after your visit        Follow-up notes from your care team     Return in about 6 months (around 5/13/2018).      Your next 10 appointments already scheduled     Nov 16, 2017  6:30 AM CST   [facility] with  [facility]   [facility] Lab Draw ([facility])    [address] [place] [address]  [address] [facility] [address]   [phone]            Nov 16, 2017 12:30 PM CST   (Arrive by 12:15 PM)   Return Visit with Jessica Cox PA-C   [facility] Cancer [facility] ([facility])    [address] [place] [address]  [address] [facility] [address]   [phone]            Nov 27, 2017  4:00 PM CST   (Arrive by 3:45 PM)   Return Visit with Shahla Crawley MD   [facility] Cancer [facility] ([facility])    [address] [place] [address]  [address] [facility] [id number] [phone]            May 21, 2018  4:20 PM CDT   (Arrive by 4:05 PM)   RETURN ENDOCRINE with Avelina Castro MD   [facility] Endocrinology ([facility])    [address] [place] [address]  3rd Floor  [facility] [id number] [phone]              Who to contact     Please call your clinic at [phone] to:    Ask questions about your health    Make or cancel appointments    Discuss your medicines    Learn about your test results    Speak to your doctor   If you have compliments or concerns about an experience at your clinic, or if you wish to file a complaint, please contact [facility] Patient  Relations at [phone] or email us at [email or web address]         Additional Information About Your Visit        [employer] Information     [employer] gives you secure access to your electronic health record. If you see a primary care provider, you can also send messages to your care team and make appointments. If you have questions, please call your primary care clinic.  If you do not have a primary care provider, please call [phone] and they will assist you.      [employer] is an electronic gateway that provides easy, online access to your medical records. With [employer], you can request a clinic appointment, read your test results, renew a prescription or communicate with your care team.     To access your existing account, please contact your [facility] or call [phone] for assistance.        Care EveryWhere ID     This is your Care EveryWhere ID. This could be used by other organizations to access your [facility] medical records  [id number]        Your Vitals Were     Pulse BMI (Body Mass Index)                69 31.28 kg/m2           Blood Pressure from Last 3 Encounters:   11/13/17 125/74   11/10/17 102/62   11/06/17 107/69    Weight from Last 3 Encounters:   11/13/17 85.3 kg (188 lb)   11/10/17 84.8 kg (187 lb)   11/06/17 83.3 kg (183 lb 9.6 oz)              Today, you had the following     No orders found for display         Today's Medication Changes          These changes are accurate as of: 11/13/17  5:46 PM.  If you have any questions, ask your nurse or doctor.               These medicines have changed or have updated prescriptions.        Dose/Directions    calcitRIOL 0.25 MCG capsule   Commonly known as:  ROCALTROL   This may have changed:  additional instructions   Used for:  Postsurgical hypothyroidism, Papillary carcinoma, follicular variant (H), Metastasis to cervical lymph node (H)        TAKE 2 CAPSULES BY MOUTH EVERY MORNING AND TAKE 1 CAPSULE BY  MOUTH EVERY NIGHT AT BEDTIME   Quantity:  270 capsule   Refills:  3       diazepam 5 MG tablet   Commonly known as:  VALIUM   This may have changed:    - when to take this  - additional instructions   Used for:  Chest wall pain        Dose:  5 mg   Take 1 tablet (5 mg) by mouth 3 times daily as needed For muscle spasms   Quantity:  90 tablet   Refills:  2       levothyroxine 112 MCG tablet   Commonly known as:  SYNTHROID/LEVOTHROID   This may have changed:    - how much to take  - additional instructions   Used for:  Postsurgical hypothyroidism, Papillary carcinoma, follicular variant (H), Postsurgical hypoparathyroidism (H), Thyroid cancer (H)        Mon-Sat: (2 tabs daily) Sunday: 3 tabs   Quantity:  189 tablet   Refills:  3       methocarbamol 750 MG tablet   Commonly known as:  ROBAXIN   This may have changed:    - when to take this  - additional instructions   Used for:  Myofascial pain        Dose:  750 mg   Take 1 tablet (750 mg) by mouth 4 times daily as needed . Ok to take a 5th Robaxin on very severe days.   Quantity:  360 tablet   Refills:  1                Primary Care Provider Office Phone # Fax #    Shahla Raul Crawley [phone]       [address] [facility] [address]  [place] [address]        Equal Access to Services     JENAE ZAMORA AH: Kevin johnsono Soesteban, waaxda luqadaha, qaybta kaalmada adeegyada, ranjan martin. So [facility] [phone].    ATENCIÓN: Si habla español, tiene a stiles disposición servicios gratuitos de asistencia lingüística. Oneil al [phone].    We comply with applicable federal civil rights laws and [place] laws. We do not discriminate on the basis of race, color, national origin, age, disability, sex, sexual orientation, or gender identity.            Thank you!     Thank you for choosing [facility] ENDOCRINOLOGY  for your care. Our goal is always to provide you with excellent care. Hearing back from our patients is one way we can  continue to improve our services. Please take a few minutes to complete the written survey that you may receive in the mail after your visit with us. Thank you!             Your Updated Medication List - Protect others around you: Learn how to safely use, store and throw away your medicines at www.disposemymeds.org.          This list is accurate as of: 11/13/17  5:46 PM.  Always use your most recent med list.                   Brand Name Dispense Instructions for use Diagnosis    acyclovir 400 MG tablet    ZOVIRAX    60 tablet    Take 1 tablet (400 mg) by mouth 2 times daily    High grade B-cell lymphoma (H)       allopurinol 300 MG tablet    ZYLOPRIM    30 tablet    Take 1 tablet (300 mg) by mouth daily    High grade B-cell lymphoma (H)       aluminum chloride 20 % external solution    DRYSOL    60 mL    Apply topically At Bedtime    Rash, Intertrigo       AZMACORT IN      Inhale 2 puffs into the lungs as needed        bisacodyl 5 MG EC tablet     30 tablet    Take 3 tablets (15 mg) by mouth daily as needed for constipation    Constipation, unspecified constipation type       calcitRIOL 0.25 MCG capsule    ROCALTROL    270 capsule    TAKE 2 CAPSULES BY MOUTH EVERY MORNING AND TAKE 1 CAPSULE BY MOUTH EVERY NIGHT AT BEDTIME    Postsurgical hypothyroidism, Papillary carcinoma, follicular variant (H), Metastasis to cervical lymph node (H)       CALCIUM CITRATE +D 315-250 MG-UNIT Tabs per tablet   Generic drug:  calcium citrate-vitamin D     120 tablet    Take 2 tablets by mouth 3 times daily        celecoxib 200 MG capsule    celeBREX    56 capsule    TAKE ONE CAPSULE BY MOUTH TWICE DAILY    Chest wall pain       cetirizine 10 MG tablet    ZYRTEC ALLERGY    30 tablet    Take 1 tablet (10 mg) by mouth 3 times daily On hold for lab test.    High grade B-cell lymphoma (H)       COMPOUND CONTAINING CONTROLLED SUBSTANCE - PHARMACY TO MIX COMPOUNDED MEDICATION    CMPD RX    120 g    Apply small amount to affected area two  times daily.    Neoplasm related pain       cromolyn 4 % ophthalmic solution    OPTICROM    10 mL    Place 1 drop into both eyes 4 times daily    Idiopathic mast cell activation syndrome (H)       cyclobenzaprine 10 MG tablet    FLEXERIL    90 tablet    Take 1 tablet (10 mg) by mouth 3 times daily as needed    High grade B-cell lymphoma (H)       dexamethasone 4 MG tablet    DECADRON    5 tablet    Take 8 mg (2 tab) by mouth 11/1 and 11/2, then 4 mg (1 tab) by mouth 11/3.    High grade B-cell lymphoma (H)       diazepam 5 MG tablet    VALIUM    90 tablet    Take 1 tablet (5 mg) by mouth 3 times daily as needed For muscle spasms    Chest wall pain       diclofenac 1 % Gel topical gel    VOLTAREN    100 g    Apply affected area two times daily PRN using enclosed dosing card.    Myofascial pain       diphenhydrAMINE 50 MG capsule    BENADRYL    56 capsule    Take 1 capsule (50 mg) by mouth nightly as needed for itching or sleep    High grade B-cell lymphoma (H)       docusate sodium 100 MG tablet    COLACE    30 tablet    Take 100 mg by mouth daily    Acute constipation       ENOVARX-LIDOCAINE HCL 10 % Crea     60 g    Externally apply 1 g topically 3 times daily as needed    Chest wall pain       EPINEPHrine 0.3 MG/0.3ML injection 2-pack    EPIPEN 2-CARIDAD    0.6 mL    Inject 0.3 mLs (0.3 mg) into the muscle once as needed for anaphylaxis        ferrous sulfate 325 (65 FE) MG tablet    IRON    90 tablet    Take 1 tablet (325 mg) by mouth 3 times daily (with meals)    Status post bariatric surgery       fluconazole 200 MG tablet    DIFLUCAN    30 tablet    Take 1 tablet (200 mg) by mouth daily    High grade B-cell lymphoma (H)       furosemide 20 MG tablet    LASIX    4 tablet    Take 2 tablets (40 mg) by mouth daily for 2 days    High grade B-cell lymphoma (H)       hydrochlorothiazide 12.5 MG capsule    MICROZIDE    90 capsule    Take 1 capsule (12.5 mg) by mouth daily    Hypocalcemia       levofloxacin 250 MG tablet     LEVAQUIN    30 tablet    Take 1 tablet (250 mg) by mouth daily    High grade B-cell lymphoma (H)       levothyroxine 112 MCG tablet    SYNTHROID/LEVOTHROID    189 tablet    Mon-Sat: (2 tabs daily) Sunday: 3 tabs    Postsurgical hypothyroidism, Papillary carcinoma, follicular variant (H), Postsurgical hypoparathyroidism (H), Thyroid cancer (H)       lidocaine visc 2% 2.5mL/5mL & maalox/mylanta w/ simeth 2.5mL/5mL & diphenhydrAMINE 5mg/5mL Susp suspension    [facility]    360 mL    Swish and spit 10 mLs in mouth every 6 hours as needed for mouth sores    Stomatitis and mucositis, High grade B-cell lymphoma (H)       lidocaine-prilocaine cream    EMLA    30 g    Apply topically as needed (port access pain.)    Neoplasm related pain, Chest wall pain       methocarbamol 750 MG tablet    ROBAXIN    360 tablet    Take 1 tablet (750 mg) by mouth 4 times daily as needed . Ok to take a 5th Robaxin on very severe days.    Myofascial pain       mometasone 110 MCG/INH inhaler    ASMANEX 30 METERED DOSES    3 Inhaler    Inhale 1 puff into the lungs daily    Intermittent asthma, uncomplicated       montelukast 10 MG tablet    SINGULAIR    120 tablet    TAKE TWO TABLETS BY MOUTH TWICE DAILY    Idiopathic mast cell activation syndrome (H)       * MS CONTIN PO      Take 60 mg by mouth daily Takes at 8pm        * morphine 30 MG 12 hr tablet    MS CONTIN    120 tablet    Take 1 tablet (30 mg) by mouth every 8 hours . Take an addition pill at night such that your evening dose is 60mg    Neoplasm related pain       NASALCROM 5.2 MG/ACT Aers Inhaler   Generic drug:  cromolyn sodium     1 Bottle    SPRAY ONE SPRAY( 1 ML) IN NOSTRIL DAILY    Mast cell disease, systemic       OMEGA 3 PO      Take 5 mLs by mouth        ondansetron 8 MG ODT tab    ZOFRAN ODT    90 tablet    Take 1 tablet (8 mg) by mouth every 8 hours as needed for nausea or vomiting    Nausea with vomiting       * order for DME     2 Units    Equipment being  ordered: compression stockings. 20-30 mm or 30 - 40 mm as patient can tolerate. Physical therapy to determine if they should be above or below the knee.    Venous stasis       * order for DME     2 Device    Equipment being ordered: compression bra    Malignant neoplasm of right female breast, unspecified site of breast       * order for DME     1 Units    Compression stockings, medium grade, please measure and fit. Please dispense a pair.    Peripheral edema       oxyCODONE IR 30 MG tablet    ROXICODONE    180 tablet    Take 1 tablet (30 mg) by mouth every 4 hours as needed for moderate to severe pain    High grade B-cell lymphoma (H)       polyethylene glycol powder    MIRALAX    510 g    Take 17 g (1 capful) by mouth daily    Acute constipation       potassium chloride SA 20 MEQ CR tablet    KLOR-CON    90 tablet    Take 1 tablet (20 mEq) by mouth daily    Hypokalemia       PROBIOTIC DAILY PO      Take 1 capsule by mouth daily Lacto acid bifidobacterium    Breast cancer, unspecified laterality, Thyroid cancer (H), Chronic arthralgias of knees and hips, unspecified laterality       prochlorperazine 10 MG tablet    COMPAZINE    60 tablet    Take 1 tablet (10 mg) by mouth every 6 hours as needed for nausea or vomiting    High grade B-cell lymphoma (H), Nausea       ranitidine 75 MG tablet    ZANTAC    270 tablet    Take 1 tablet (75 mg) by mouth 3 times daily    Mast cell disease, systemic       senna-docusate 8.6-50 MG per tablet    SENOKOT-S;PERICOLACE    60 tablet    Take 1-2 tablets by mouth 2 times daily as needed for constipation    Acute constipation       SUMAtriptan 50 MG tablet    IMITREX    9 tablet    Take 1 tablet (50 mg) by mouth at onset of headache for migraine (may repeat in 2 hours as needed, max 2 tablets daily)    Migraine without status migrainosus, not intractable, unspecified migraine type       tacrolimus 0.1 % ointment    PROTOPIC    60 g    Apply topically 2 times daily    Rash, Intertrigo        triamcinolone 0.025 % ointment    KENALOG    80 g    Apply topically 2 times daily Mix with 1 lb jar of vaseline or aquaphor    Intertrigo, Rash       TUMS 500 MG chewable tablet   Generic drug:  calcium carbonate     180 chew tab    Take 2 tablets (1,000 mg) by mouth nightly as needed for other (cramps)        * UNABLE TO FIND      3 tablets 3 times daily MEDICATION NAME: calcium D-Glucarate  3 caps contain 180mg of elemental calcium.        * UNABLE TO FIND      2 tablets 3 times daily MEDICATION NAME: Natural D-Hist (on hold during chemo)    Breast cancer, unspecified laterality, Papillary carcinoma, follicular variant (H), Chronic musculoskeletal pain       * UNABLE TO FIND      MEDICATION NAME: Tumeric 3 capsules daily (on hold during chemo)        * UNABLE TO FIND      Take 2 capsules by mouth 3 times daily Muscle Mag. 2 caps contain B1 20mg, B2 20mg, B6 10mg, magesium 20mg, manganese 2mg.        * UNABLE TO FIND      2 tablets 3 times daily MEDICATION NAME: Digestzymes    Thyroid cancer (H), Postsurgical hypothyroidism, Postsurgical hypoparathyroidism (H)       * UNABLE TO FIND      1 tablet daily MEDICATION NAME: Pure Encapsulations    Thyroid cancer (H), Postsurgical hypothyroidism, Postsurgical hypoparathyroidism (H)       VENTOLIN  (90 BASE) MCG/ACT Inhaler   Generic drug:  albuterol     18 g    INHALE 2 PUFFS INTO THE LUNGS EVERY 4 HOURS AS NEEDED FOR SHORTNESS OF BREATH OR DIFFICULT BREATHING OR WHEEZING    Mild intermittent asthma without complication       vitamin D3 2000 UNITS Caps     60 capsule    Take 5,000 Units by mouth daily Takes 2 tabs daily    Thyroid cancer (H), Postsurgical hypothyroidism, Papillary carcinoma, follicular variant (H), Postsurgical hypoparathyroidism (H)       * Notice:  This list has 11 medication(s) that are the same as other medications prescribed for you. Read the directions carefully, and ask your doctor or other care provider to review them with you.

## 2017-11-13 NOTE — NURSING NOTE
Chief Complaint   Patient presents with     Port Draw     Labs drawn via port by RN. Line flusehd and hep locked, then deaccessed.      Ila Navarro RN

## 2017-11-13 NOTE — PROGRESS NOTES
Endocrinology Consult Note    Attending ASSESSMENT/PLAN:     1.  Papillary thyroid carcinoma, follicular variant, + ETE (minimal), bilateral, MF, node +.  MACIS is < 6 but TNM is stage III, ANSELMO recurrence risk Intermediate.  She has been treated with total Tx and CND and RRA. She now s/p ETOH treatment to 2  left neck LNs (left level 6 and  left level 3).  Left level 6 LN is gone.      Persistent thyroglobulinemia indicates we still have thyroid tissue.  Repeat level is pending.   2.  Cervical adenopathy   A) biopsy proven metastatic thyroid cancer  Left level 3  Cervical LN  (0.5 cm, round) positive for papillary thyroid cancer cytology. This was ETOH treated 8/19/14  And 10/24/14.  I believe the treatment has resolved  this node.    Left level 6 has very high Tg consistent with metastatic thyroid cancer. This was treated with ETOh on 8/19/14 and 10/24/14, and I think also on 12/30 and 12/31/14.  (it had benign cytology on repeat FNAB  12/30/14).  This is gone on 11/15 US , 5/16 and 11/16 US  Left level 3 medial to left level 6 - benign on FNAB; I do not think this was treated. This persists and appear benign     3.  Post surgical hypothyroidism. Treat to TSH < 0.4 because of # 1.  She is on  LT4  112*15/week, divided (mean 240 mcg/day) with TSH at target.     4.   Hypocalcemia with normal PTH. She has low albumin.   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   She is having a hard time getting the calcium in, possibly having some low calcium symtpoms.     History of gregory en Y gastric bypass is potentially going to affect absorption of the calcium/ vitamin D/ L-T4. I believe this is an important history relative to the hypoparathyoridsim    Avelina Castro MD        Cc/  HISTORY OF PRESENT ILLNESS Tisha presents today for follow up of thyroid  cancer and post surgical hypothyroidism. She was lst seen by me 5/17.  She had a recent neck US which I have reviewed on PACs today.  At that time she was on   LT4  224 x 7.25/week  , divided (232 mcg/day average).  I recommended to Change the levothyroxine to the following, using the 112 mcg tablets:  Monday-Saturday: (2 tablet/day);   Sunday: (3 tablets)  This averages 240 mcg/day over the course of a week.  Today she confirms she is taking the dose as indicated above.     Since our last visit, Elvin has been diagnosed with B cell high grade lymphoma, presenting with back pain and lytic lesion right 10th rib. She is currently undergoing DA-R-EPOCH treatment every 3 weeks.  The treatment includes dexamethasone.     Thyroid cancer was discovered in 2013 in the course of getting PET followup for new diagnosis of breast cancer.    Her course can be summarized as follows:  7/10/13  total Thyroidectomy and CND  removing bilateral, multifocal papillary thyroid carcinoma, left 1.3 cm (Follicular variant) with minimal extrathyoridal extension and right 0.25 cm (microcarcinoma variant).  2/3 left level 6 LNs were + for PCT (MACIS 5.55, TNM stage III).    9/5/13: Thyrogen stimulated 105 mCi 131I .  Post treatment TBS showed intense neck uptake.      6/11/14 FNAB of left  level 3 node with ? microcalcifications cytology positive for malignancy - papillary Ca, needle wash Tg 6.4 ng/ml.    left level 6 LN  Cytology  benign butneedle wash Tg 1213 ng/ml.   8/18/14 and on 10/24/14 ETOH injection into these  2 lymph nodes.      12/30/14  repeat FNAB of medial to level 6 left level 3 and left level 6 LN .  FNAB of the left level 3 LN and left level 6 LN on 12/30 was read as benign.  Needle wash Tg was < 0.3 (left level 3) and 0.6 (left level 6).    12/30 and 12/31/14  ETOH ablation of left level 6 LN (per the images), though the report states this is left level 3 LN that was ablated.     We have the following tumor marker  data  5/2/13: Tg 45, ANSELMO < 0.4, TSH   SURGERY  9/5/13: Tg 12.6 ng/ml, ANSELMO < 0.4 ,   I131 105 mCi  10/22/13: Tg 3.7, Anselmo < 0.4 U/ml, TSH 0.27  3/7/14: Tg 5.4, ANSELMO < 0.4, TSH   6/3/14: Tg 1.3, ANSELMO < 0.4, TSH  Not done  7/18/14 Tg 13.6, ANSELMO < 0.4, TSH 16.9 Thyrogen stimulated  123I TBS negative. We did not see uptake in the cervical LNs we know are biopsy proven PCT (as is often the case).   ETOH treatments 8/14 and 10/14  11/25/14 Tg 3.5, ANSELMO < 0.4, TSH 4.95  12/30/14 ETOH treatment  2/17/15: Tg 1.4, ANSELMO < 0.4, TSH 0.41  5/18/15: Tg 1.3, ANSELMO < 0.4, TSH 0.42--  12/7/15: Tg 1.3, ANSELMO < 0.4, TSH 0.12  5/2/16 Tg 1.1 ng/ml , ANSELMO  < 0.4 U/ml.   11/2/16: Tg 1.4, ANSELMO < 0.4, TSH 0.45.  -   4/26/17: Tg 1.1, ANSELMO < 0.4, TSH 0.5  11/2/17 Tg in process ; Tg 1.3 , ANSELMO < 0.4 - result followed appt - concurrent remeasure 4/16 Tg 0.84.  TSH 0.22    Neck US 11/2/17 (comapred with 4/26/17 ):    Right level 4 # 1 not seen  (was 0.7 x 0.5 x 0.6 cm  Left level 3 # 1 (medial to IJ): not seen on stills - I think I see it on cine -  (was  0.6 x 0.5 x 1.2  (was 0.7  0.5 x 1.3 ( was  0.6 x 0.5 x 1.4 cm prominent hilum     ROS  Weight loss  Hard time eating  Cardiac: negative  Respiratory: HEIN on long distance or climbing stairs;   GI: constipation  Hands will cramp and twist; this last occurred last night  Leg cramps have been relieved by compression stockings. 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 No real need for Epipen for this alone.     GDM (gestational diabetes mellitus)     w first pregnancy only     Gestational hypertension       Lymphedema     jackson arms, chest     Migraine      Neuropathy associated with cancer (H)      Papillary carcinoma, follicular variant (H) 2013    pT3, N1, Mx, thyroid     Post-surgical hypothyroidism      Renal disease     kidney stones     Rhinitis, allergic to other allergen      Right Breast mass and microcalcifications 2011     Seasonal allergic conjunctivitis      Seasonal allergic rhinitis 11 skin tests pos. for: dog/M/T/G/W--NEGATIVE FOOD TESTS FOR: shrimp, crab, lobster, coconut     Past Surgical History:   Procedure Laterality Date     BACK SURGERY  ,    Bulging disc w nerve root impigment     BIOPSY BREAST      right     BIOPSY BREAST  11    right, core and sterotactic     BONE MARROW BIOPSY, BONE SPECIMEN, NEEDLE/TROCAR Left 10/3/2017    Procedure: BIOPSY BONE MARROW;  Bone Marrow Biopsy with aspirate;  Surgeon: Amelia Watkins PA-C;  Location: [facility] OR     BYPASS GASTRIC, CHOLECYSTECTOMY, COMBINED       C LAPAROSCOPIC GASTRIC RESTRICTIVE PX, W/GASTRIC BYPASS/ GAMALIEL-EN-Y, < 150CM      Gamaliel en Y?      SECTION       COLONOSCOPY      normal     COSMETIC SURGERY  2012    Final Stage of Mastectomy     DAVINCI HYSTERECTOMY TOTAL, BILATERAL SALPINGO-OOPHORECTOMY, COMBINED  2012    Procedure: COMBINED DAVINCI HYSTERECTOMY TOTAL, SALPINGO-OOPHORECTOMY;  Davinci Total Laparoscopic Hysterectomy, Bilateral Salpingo Oophorectomy, Pelvic Washings, Cystoscopy;  Surgeon: Evie Sheikh MD;  Location: [facility] OR     ENT SURGERY       ESOPHAGOSCOPY, GASTROSCOPY, DUODENOSCOPY (EGD), COMBINED N/A 2017    Procedure: COMBINED ENDOSCOPIC ULTRASOUND, ESOPHAGOSCOPY, GASTROSCOPY, DUODENOSCOPY (EGD), FINE NEEDLE ASPIRATE/BIOPSY;  Esophagogastroduodenoscopy, Endoscopic Ultrasound, with fine needle biopsy aspirate;  Surgeon: Patrick Robles MD;  Location: [facility] OR     GI SURGERY  2007    Gamaliel-en Y w cholecystectomy     GYN SURGERY  89,1/10/92    2  c-sections     HYSTERECTOMY, PAP NO LONGER INDICATED  12/12    DeVinci assister lap hyst with BSO     INSERT PORT VASCULAR ACCESS Right 10/4/2017    Procedure: INSERT PORT VASCULAR ACCESS;  Port Placement;  Surgeon: Jc Goodman PA-C;  Location: [facility]     IRRIGATION AND DEBRIDEMENT BREAST  3/1/2012    Procedure:IRRIGATION AND DEBRIDEMENT BREAST; Irrigation and Debridement, Wound Closure Right Breast; Surgeon:JUNG GUILLEN; Location:[facility] OR     MASTECTOMY, RECONSTRUCT BREAST, COMBINED  1/30/2012    Procedure:COMBINED MASTECTOMY, RECONSTRUCT BREAST; Bilateral Mastectomies, Right Axillary [place] Node Biopsy, Bilateral Breast Reconstruction with Tissue Expanders, Reconstruction of inframammary fold, bilateral pain management systems; Surgeon:ANUPAM CAMPBELL; Location:[facility]     RECONSTRUCT BREAST  8/31/2012    Procedure: RECONSTRUCT BREAST;  Bilateral 2nd stage breast reconstruction, revision,       SOFT TISSUE SURGERY  1-30-12    Mastectomy w severe myofascial pain syndrome     THYROIDECTOMY  7/10/2013    Procedure: THYROIDECTOMY;  Total Thyroidectomy with central neck dissection;  Surgeon: Indiana Sneed MD;  Location: [facility] OR     TONSILLECTOMY      childhood       Medications  Current Outpatient Prescriptions   Medication Sig Dispense Refill     prochlorperazine (COMPAZINE) 10 MG tablet Take 1 tablet (10 mg) by mouth every 6 hours as needed for nausea or vomiting 60 tablet 3     ENOVARX-LIDOCAINE HCL 10 % CREA Externally apply 1 g topically 3 times daily as needed 60 g 3     fluconazole (DIFLUCAN) 200 MG tablet Take 1 tablet (200 mg) by mouth daily 30 tablet 0     cetirizine (ZYRTEC ALLERGY) 10 MG tablet Take 1 tablet (10 mg) by mouth 3 times daily On hold for lab test. 30 tablet 0     acyclovir (ZOVIRAX) 400 MG tablet Take 1 tablet (400 mg) by mouth 2 times daily 60 tablet 0     dexamethasone (DECADRON) 4 MG tablet Take 8 mg (2 tab) by mouth 11/1 and 11/2, then 4 mg (1 tab) by mouth 11/3. 5  tablet 0     diclofenac (VOLTAREN) 1 % GEL topical gel Apply affected area two times daily PRN using enclosed dosing card. 100 g 0     furosemide (LASIX) 20 MG tablet Take 2 tablets (40 mg) by mouth daily for 2 days 4 tablet 0     levofloxacin (LEVAQUIN) 250 MG tablet Take 1 tablet (250 mg) by mouth daily 30 tablet 0     allopurinol (ZYLOPRIM) 300 MG tablet Take 1 tablet (300 mg) by mouth daily 30 tablet 0     ferrous sulfate (IRON) 325 (65 FE) MG tablet Take 1 tablet (325 mg) by mouth 3 times daily (with meals) 90 tablet 0     bisacodyl (DULCOLAX) 5 MG EC tablet Take 3 tablets (15 mg) by mouth daily as needed for constipation 30 tablet 0     docusate sodium (COLACE) 100 MG tablet Take 100 mg by mouth daily 30 tablet 0     polyethylene glycol (MIRALAX) powder Take 17 g (1 capful) by mouth daily 510 g 0     magic mouthwash suspension (diphenhydrAMINE, lidocaine, aluminum-magnesium & simethicone) Swish and spit 10 mLs in mouth every 6 hours as needed for mouth sores 360 mL 1     cyclobenzaprine (FLEXERIL) 10 MG tablet Take 1 tablet (10 mg) by mouth 3 times daily as needed 90 tablet 0     senna-docusate (SENOKOT-S;PERICOLACE) 8.6-50 MG per tablet Take 1-2 tablets by mouth 2 times daily as needed for constipation 60 tablet 0     oxyCODONE (ROXICODONE) 30 MG IR tablet Take 1 tablet (30 mg) by mouth every 4 hours as needed for moderate to severe pain 180 tablet 0     morphine (MS CONTIN) 30 MG 12 hr tablet Take 1 tablet (30 mg) by mouth every 8 hours . Take an addition pill at night such that your evening dose is 60mg 120 tablet 0     UNABLE TO FIND Take 2 capsules by mouth 3 times daily Muscle Mag. 2 caps contain B1 20mg, B2 20mg, B6 10mg, magesium 20mg, manganese 2mg.       Morphine Sulfate (MS CONTIN PO) Take 60 mg by mouth daily Takes at 8pm       lidocaine-prilocaine (EMLA) cream Apply topically as needed (port access pain.) 30 g 1     SUMAtriptan (IMITREX) 50 MG tablet Take 1 tablet (50 mg) by mouth at onset of  headache for migraine (may repeat in 2 hours as needed, max 2 tablets daily) 9 tablet 3     calcitRIOL (ROCALTROL) 0.25 MCG capsule TAKE 2 CAPSULES BY MOUTH EVERY MORNING AND TAKE 1 CAPSULE BY MOUTH EVERY NIGHT AT BEDTIME (Patient taking differently: TAKE 2 CAPSULES BY MOUTH EVERY MORNING AND TAKE 1 CAPSULE BY MOUTH at noon) 270 capsule 3     celecoxib (CELEBREX) 200 MG capsule TAKE ONE CAPSULE BY MOUTH TWICE DAILY  56 capsule 0     order for DME Compression stockings, medium grade, please measure and fit. Please dispense a pair. 1 Units 0     diphenhydrAMINE (BENADRYL) 50 MG capsule Take 1 capsule (50 mg) by mouth nightly as needed for itching or sleep (Patient not taking: Reported on 11/3/2017) 56 capsule      methocarbamol (ROBAXIN) 750 MG tablet Take 1 tablet (750 mg) by mouth 4 times daily as needed . Ok to take a 5th Robaxin on very severe days. (Patient taking differently: Take 750 mg by mouth 4 times daily . Ok to take a 5th Robaxin on very severe days.) 360 tablet 1     ondansetron (ZOFRAN-ODT) 8 MG ODT tab Take 1 tablet (8 mg) by mouth every 8 hours as needed for nausea or vomiting 90 tablet 3     COMPOUND CONTAINING CONTROLLED SUBSTANCE (CMPD RX) - PHARMACY TO MIX COMPOUNDED MEDICATION Apply small amount to affected area two times daily. (Patient not taking: Reported on 11/3/2017) 120 g 6     diazepam (VALIUM) 5 MG tablet Take 1 tablet (5 mg) by mouth 3 times daily as needed For muscle spasms (Patient taking differently: Take 5 mg by mouth 3 times daily For muscle spasms) 90 tablet 2     levothyroxine (SYNTHROID/LEVOTHROID) 112 MCG tablet Mon-Sat: (2 tabs daily) Sunday: 3 tabs (Patient taking differently: 112 mcg Mon-Sat: (2 tabs daily) Sunday: 3 tabs) 189 tablet 3     hydrochlorothiazide (MICROZIDE) 12.5 MG capsule Take 1 capsule (12.5 mg) by mouth daily 90 capsule 2     montelukast (SINGULAIR) 10 MG tablet TAKE TWO TABLETS BY MOUTH TWICE DAILY 120 tablet 11     EPINEPHrine (EPIPEN 2-CARIDAD) 0.3 MG/0.3ML  injection Inject 0.3 mLs (0.3 mg) into the muscle once as needed for anaphylaxis 0.6 mL 3     potassium chloride SA (POTASSIUM CHLORIDE) 20 MEQ CR tablet Take 1 tablet (20 mEq) by mouth daily 90 tablet 3     VENTOLIN  (90 BASE) MCG/ACT Inhaler INHALE 2 PUFFS INTO THE LUNGS EVERY 4 HOURS AS NEEDED FOR SHORTNESS OF BREATH OR DIFFICULT BREATHING OR WHEEZING (Patient not taking: Reported on 11/3/2017) 18 g 3     cromolyn (OPTICROM) 4 % ophthalmic solution Place 1 drop into both eyes 4 times daily 10 mL 5     NASALCROM 5.2 MG/ACT AERS Inhaler SPRAY ONE SPRAY( 1 ML) IN NOSTRIL DAILY 1 Bottle 6     Cholecalciferol (VITAMIN D3) 2000 UNITS CAPS Take 5,000 Units by mouth daily Takes 2 tabs daily 60 capsule 4     order for DME Equipment being ordered: compression stockings. 20-30 mm or 30 - 40 mm as patient can tolerate. Physical therapy to determine if they should be above or below the knee. 2 Units 4     order for DME Equipment being ordered: compression bra (Patient not taking: Reported on 11/3/2017) 2 Device 1     ranitidine (ZANTAC) 75 MG tablet Take 1 tablet (75 mg) by mouth 3 times daily 270 tablet 3     aluminum chloride (DRYSOL) 20 % external solution Apply topically At Bedtime (Patient not taking: Reported on 11/3/2017) 60 mL 3     tacrolimus (PROTOPIC) 0.1 % ointment Apply topically 2 times daily (Patient not taking: Reported on 11/3/2017) 60 g 3     triamcinolone (KENALOG) 0.025 % ointment Apply topically 2 times daily Mix with 1 lb jar of vaseline or aquaphor (Patient not taking: Reported on 11/3/2017) 80 g 3     UNABLE TO FIND MEDICATION NAME: Tumeric 3 capsules daily (on hold during chemo)       mometasone (ASMANEX 30 METERED DOSES) 110 MCG/INH inhaler Inhale 1 puff into the lungs daily (Patient not taking: Reported on 11/3/2017) 3 Inhaler 3     UNABLE TO FIND 2 tablets 3 times daily MEDICATION NAME: Natural D-Hist (on hold during chemo)       calcium citrate-vitamin D (CALCIUM CITRATE +D) 315-250 MG-UNIT  TABS Take 2 tablets by mouth 3 times daily 120 tablet      calcium carbonate (TUMS) 500 MG chewable tablet Take 2 tablets (1,000 mg) by mouth nightly as needed for other (cramps) 180 chew tab 3     UNABLE TO FIND 3 tablets 3 times daily MEDICATION NAME: calcium D-Glucarate   3 caps contain 180mg of elemental calcium.       Triamcinolone Acetonide (AZMACORT IN) Inhale 2 puffs into the lungs as needed       UNABLE TO FIND 2 tablets 3 times daily MEDICATION NAME: Digestzymes        UNABLE TO FIND 1 tablet daily MEDICATION NAME: Pure Encapsulations       Omega-3 Fatty Acids (OMEGA 3 PO) Take 5 mLs by mouth       Probiotic Product (PROBIOTIC DAILY PO) Take 1 capsule by mouth daily Lacto acid bifidobacterium       She is back up to lasix 40 mg  Currently having a hard time getting the calcium in      Allergies  Allergies   Allergen Reactions     Baclofen Other (See Comments) and Unknown     Other reaction(s): Edema, chest pain, seizures.      No Clinical Screening - See Comments Shortness Of Breath, Palpitations, Anaphylaxis, Itching, Swelling, Difficulty breathing and Rash     Sukhjinder wipes- oral allergy -  July 2015: throat tightness from a Chinese herbal medicine Wilmer Barry Tran     Serotonin Anxiety, Other (See Comments) and Swelling     Seizures      Amitriptyline Hcl Swelling     Birch Trees      Potatoes, carrots, cherries, celery, apple, pears, plums, peaches, parsnip, kiwi, hazelnuts, and apricots,      Blue Dyes Itching     Headaches       Cymbalta Other (See Comments)     Flushing, tremor/muscle twitching and edema     Gabapentin Other (See Comments)     edema  Systemic edema, weaned off from Feb to March per Dr. Dowd.    edema     Grass      Mugwort [Artemisia Vulgaris]      Various spices     Ragweeds      Melons, bananas, cucumbers, zucchini.     Topamax      Nortriptyline Itching, Visual Disturbance, Swelling, GI Disturbance, Anxiety, Other (See Comments) and Nausea     Other reaction(s): Swelling        Family History  No thyroid  Family History   Problem Relation Age of Onset     Allergies Mother      Arthritis Mother      CANCER Mother      uterine/uterine     Hypertension Mother      on HCTZ     Hyperlipidemia Mother      CEREBROVASCULAR DISEASE Mother      s/p brain surgery     Breast Cancer Mother      Depression Mother      Anxiety Disorder Mother      Anesthesia Reaction Mother      Asthma Mother      Skin Cancer Mother      HEART DISEASE Father      DIABETES Father      Coronary Artery Disease Father      Hypertension Father      Hyperlipidemia Father      CEREBROVASCULAR DISEASE Father      Multiple strokes     Obesity Father      DIABETES Brother      Depression Brother      Hypertension Brother      CANCER Maternal Grandfather      pancreatic cancer/stomach cancer     Prostate Cancer Maternal Grandfather      Prostrate and Bladder     Other Cancer Maternal Grandfather      Pancreatic 1988, Bladder 1977     CANCER Maternal Grandmother      uterine     Breast Cancer Maternal Grandmother      Skin Cancer Maternal Grandmother      CANCER Brother 57     pancreatic cancer     DIABETES Brother      Breast Cancer Cousin      Grand mothers sister     Other Cancer Brother      Stage 3 Pancreatic 2-2015     Depression Brother      Asthma Brother      Obesity Brother      Anesthesia Reaction Other      H/a, itching, drug interactions     Asthma Other      CANCER Brother      Pancreatic      Thyroid Disease No family hx of        Social History  Social History   Substance Use Topics     Smoking status: Never Smoker     Smokeless tobacco: Never Used      Comment: no 2nd hand smoke exposure     Alcohol use No      Comment: rare     RN.  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     Physical Exam  There were no vitals taken for this visit.  There is no height or weight on file to calculate BMI.  GENERAL :   middle aged woman.  She looks tired  SKIN: Normal color; Alopecia/ scarf on head  EYES:  PER,No scleral icterus  NECK no masses  LUNGS: clear bilaterally  CARDIAC: RRR S1 S2  NEURO: awake, alert, responds appropriately to questions.  Moves all extremities;    DATA REVIEW    ENDO THYROID LABS-[facility] Latest Ref Rng & Units 11/2/2017 7/12/2017   TSH 0.40 - 4.00 mU/L 0.22 (L) 0.02 (L)   T4 FREE 0.76 - 1.46 ng/dL 1.30 1.54 (H)     ENDO THYROID LABS-[facility] Latest Ref Rng & Units 4/26/2017   TSH 0.40 - 4.00 mU/L 0.50   T4 FREE 0.76 - 1.46 ng/dL 1.08       EXAMINATION: Ultrasound soft tissue neck on, 11/2/2017 5:37 PM      COMPARISON: Soft tissue neck on 4/26/2017     HISTORY:  Thyroid cancer (H) [C73 (ICD-10-CM)]; Postsurgical  hypothyroidism [E89.0 (ICD-10-CM)]; Papillary carcinoma, follicular  variant (H) [C73 (ICD-10-CM)]; Postsurgical hypoparathyroidism [E89.2  (ICD-10-CM)]       FINDINGS:     Lymph nodes are measured bilaterally with measurements given in  craniocaudal, transverse and AP dimensions as follows:     Right:  Level 1: No evidence of lymphadenopathy  Level 2: No evidence of lymphadenopathy  Level 3: No evidence of lymphadenopathy  Level 4: No evidence of lymphadenopathy  Level 5: No evidence of lymphadenopathy  Level 6: No evidence of lymphadenopathy     Left:  Level 1: No evidence of lymphadenopathy  Level 2: No evidence of lymphadenopathy  Level 3: No evidence of lymphadenopathy  Level 4: No evidence of lymphadenopathy  Level 5: No evidence of lymphadenopathy  Level 6: No evidence of lymphadenopathy         IMPRESSION:No sonographic evidence of significantly enlarged lymph  nodes in the neck.     I have personally reviewed the examination and initial interpretation  and I agree with the findings.     HAJA ELDER MD    ENDO CALCIUM LABS-[facility] Latest Ref Rng & Units 11/10/2017   CALCIUM 8.5 - 10.1 mg/dL 8.0 (L)   CALCIUM IONIZED 4.4 - 5.2 mg/dL    CALCIUM IONIZED WHOLE BLOOD 4.4 - 5.2 mg/dL    PHOSPHOROUS 2.5 - 4.5 mg/dL    MAGNESIUM 1.6 - 2.3 mg/dL    ALBUMIN 3.4 - 5.0 g/dL 2.6 (L)   BUN 7 - 30  mg/dL 16   CREATININE 0.52 - 1.04 mg/dL 0.90   PARATHYROID HORMONE INTACT 12 - 72 pg/mL    ALKPHOS 40 - 150 U/L 82   25 OH VIT D2 ug/L    25 OH VIT D3 ug/L    25 OH VIT D TOTAL 20 - 75 ug/L    VITAMIN D DEFICIENCY SCREENING 20 - 75 ug/L    PROTEIN, TOTAL 6.8 - 8.8 g/dL 6.0 (L)     ENDO CALCIUM LABS-[facility] Latest Ref Rng & Units 11/6/2017   CALCIUM 8.5 - 10.1 mg/dL 7.9 (L)   CALCIUM IONIZED 4.4 - 5.2 mg/dL    CALCIUM IONIZED WHOLE BLOOD 4.4 - 5.2 mg/dL    PHOSPHOROUS 2.5 - 4.5 mg/dL    MAGNESIUM 1.6 - 2.3 mg/dL    ALBUMIN 3.4 - 5.0 g/dL 2.7 (L)   BUN 7 - 30 mg/dL 17   CREATININE 0.52 - 1.04 mg/dL 0.73   PARATHYROID HORMONE INTACT 12 - 72 pg/mL    ALKPHOS 40 - 150 U/L 52   25 OH VIT D2 ug/L    25 OH VIT D3 ug/L    25 OH VIT D TOTAL 20 - 75 ug/L    VITAMIN D DEFICIENCY SCREENING 20 - 75 ug/L    PROTEIN, TOTAL 6.8 - 8.8 g/dL 5.2 (L)     ENDO CALCIUM LABS-[facility] Latest Ref Rng & Units 11/3/2017   CALCIUM 8.5 - 10.1 mg/dL 8.6   CALCIUM IONIZED 4.4 - 5.2 mg/dL    CALCIUM IONIZED WHOLE BLOOD 4.4 - 5.2 mg/dL    PHOSPHOROUS 2.5 - 4.5 mg/dL    MAGNESIUM 1.6 - 2.3 mg/dL    ALBUMIN 3.4 - 5.0 g/dL 3.3 (L)   BUN 7 - 30 mg/dL 26   CREATININE 0.52 - 1.04 mg/dL 0.82   PARATHYROID HORMONE INTACT 12 - 72 pg/mL    ALKPHOS 40 - 150 U/L 75   25 OH VIT D2 ug/L    25 OH VIT D3 ug/L    25 OH VIT D TOTAL 20 - 75 ug/L    VITAMIN D DEFICIENCY SCREENING 20 - 75 ug/L    PROTEIN, TOTAL 6.8 - 8.8 g/dL 6.2 (L)     ENDO CALCIUM LABS-[facility] Latest Ref Rng & Units 11/2/2017   CALCIUM 8.5 - 10.1 mg/dL 8.8   CALCIUM IONIZED 4.4 - 5.2 mg/dL    CALCIUM IONIZED WHOLE BLOOD 4.4 - 5.2 mg/dL    PHOSPHOROUS 2.5 - 4.5 mg/dL 3.6   MAGNESIUM 1.6 - 2.3 mg/dL    ALBUMIN 3.4 - 5.0 g/dL 3.6   BUN 7 - 30 mg/dL 23   CREATININE 0.52 - 1.04 mg/dL 0.88   PARATHYROID HORMONE INTACT 12 - 72 pg/mL    ALKPHOS 40 - 150 U/L 61   25 OH VIT D2 ug/L <5   25 OH VIT D3 ug/L 48   25 OH VIT D TOTAL 20 - 75 ug/L <53   VITAMIN D DEFICIENCY SCREENING 20 - 75 ug/L    PROTEIN, TOTAL 6.8 - 8.8 g/dL  6.6 (L)

## 2017-11-13 NOTE — LETTER
11/13/2017       RE: Tisha Arias  [address] [place] [address]     Dear Colleague,    Thank you for referring your patient, Tisha Arias, to the [facility] ENDOCRINOLOGY at [facility]. Please see a copy of my visit note below.    Endocrinology Consult Note    Attending ASSESSMENT/PLAN:     1.  Papillary thyroid carcinoma, follicular variant, + ETE (minimal), bilateral, MF, node +.  MACIS is < 6 but TNM is stage III, ANSELMO recurrence risk Intermediate.  She has been treated with total Tx and CND and RRA. She now s/p ETOH treatment to 2  left neck LNs (left level 6 and  left level 3).  Left level 6 LN is gone.      Persistent thyroglobulinemia indicates we still have thyroid tissue.  Repeat level is pending.   2.  Cervical adenopathy   A) biopsy proven metastatic thyroid cancer  Left level 3  Cervical LN  (0.5 cm, round) positive for papillary thyroid cancer cytology. This was ETOH treated 8/19/14  And 10/24/14.  I believe the treatment has resolved  this node.    Left level 6 has very high Tg consistent with metastatic thyroid cancer. This was treated with ETOh on 8/19/14 and 10/24/14, and I think also on 12/30 and 12/31/14.  (it had benign cytology on repeat FNAB  12/30/14).  This is gone on 11/15 US , 5/16 and 11/16 US  Left level 3 medial to left level 6 - benign on FNAB; I do not think this was treated. This persists and appear benign     3.  Post surgical hypothyroidism. Treat to TSH < 0.4 because of # 1.  She is on  LT4  112*15/week, divided (mean 240 mcg/day) with TSH at target.     4.   Hypocalcemia with normal PTH. She has low albumin.   I think the problem is more one of calcium malabsorption (related to past Rachael en Y) than hypoparathyroidisim, though perhaps she also has subclinical hypoparathyroidism which makes it harder to compensate.   She is having a hard time getting the calcium in, possibly having some low calcium symtpoms.     History of  gregory en Y gastric bypass is potentially going to affect absorption of the calcium/ vitamin D/ L-T4. I believe this is an important history relative to the hypoparathyoridsim    Avelina Castro MD        Cc/  HISTORY OF PRESENT ILLNESS Tisha presents today for follow up of thyroid cancer and post surgical hypothyroidism. She was lst seen by me 5/17.  She had a recent neck US which I have reviewed on PACs today.  At that time she was on   LT4  224 x 7.25/week  , divided (232 mcg/day average).  I recommended to Change the levothyroxine to the following, using the 112 mcg tablets:  Monday-Saturday: (2 tablet/day);   Sunday: (3 tablets)  This averages 240 mcg/day over the course of a week.  Today she confirms she is taking the dose as indicated above.     Since our last visit, Elvin has been diagnosed with B cell high grade lymphoma, presenting with back pain and lytic lesion right 10th rib. She is currently undergoing DA-R-EPOCH treatment every 3 weeks.  The treatment includes dexamethasone.     Thyroid cancer was discovered in 2013 in the course of getting PET followup for new diagnosis of breast cancer.    Her course can be summarized as follows:  7/10/13  total Thyroidectomy and CND  removing bilateral, multifocal papillary thyroid carcinoma, left 1.3 cm (Follicular variant) with minimal extrathyoridal extension and right 0.25 cm (microcarcinoma variant).  2/3 left level 6 LNs were + for PCT (MACIS 5.55, TNM stage III).    9/5/13: Thyrogen stimulated 105 mCi 131I .  Post treatment TBS showed intense neck uptake.      6/11/14 FNAB of left  level 3 node with ? microcalcifications cytology positive for malignancy - papillary Ca, needle wash Tg 6.4 ng/ml.    left level 6 LN  Cytology  benign butneedle wash Tg 1213 ng/ml.   8/18/14 and on 10/24/14 ETOH injection into these  2 lymph nodes.      12/30/14  repeat FNAB of medial to level 6 left level 3 and left level 6 LN .  FNAB of the left level 3 LN and left level  6 LN on 12/30 was read as benign.  Needle wash Tg was < 0.3 (left level 3) and 0.6 (left level 6).    12/30 and 12/31/14  ETOH ablation of left level 6 LN (per the images), though the report states this is left level 3 LN that was ablated.     We have the following tumor marker data  5/2/13: Tg 45, ANSELMO < 0.4, TSH   SURGERY  9/5/13: Tg 12.6 ng/ml, ANSELMO < 0.4 ,   I131 105 mCi  10/22/13: Tg 3.7, Anselmo < 0.4 U/ml, TSH 0.27  3/7/14: Tg 5.4, ANSELMO < 0.4, TSH   6/3/14: Tg 1.3, ANSELMO < 0.4, TSH  Not done  7/18/14 Tg 13.6, ANSELMO < 0.4, TSH 16.9 Thyrogen stimulated  123I TBS negative. We did not see uptake in the cervical LNs we know are biopsy proven PCT (as is often the case).   ETOH treatments 8/14 and 10/14  11/25/14 Tg 3.5, ANSELMO < 0.4, TSH 4.95  12/30/14 ETOH treatment  2/17/15: Tg 1.4, ANSELMO < 0.4, TSH 0.41  5/18/15: Tg 1.3, ANSELMO < 0.4, TSH 0.42--  12/7/15: Tg 1.3, ANSELMO < 0.4, TSH 0.12  5/2/16 Tg 1.1 ng/ml , ANSELMO  < 0.4 U/ml.   11/2/16: Tg 1.4, ANSELMO < 0.4, TSH 0.45.  -   4/26/17: Tg 1.1, ANSELMO < 0.4, TSH 0.5  11/2/17 Tg in process     Neck US 11/2/17 (comapred with 4/26/17 ):    Right level 4 # 1 not seen  (was 0.7 x 0.5 x 0.6 cm  Left level 3 # 1 (medial to IJ): not seen on stills - I think I see it on cine -  (was  0.6 x 0.5 x 1.2  (was 0.7  0.5 x 1.3 ( was  0.6 x 0.5 x 1.4 cm prominent hilum     ROS  Weight loss  Hard time eating  Cardiac: negative  Respiratory: HEIN on long distance or climbing stairs;   GI: constipation  Hands will cramp and twist; this last occurred last night  Leg cramps have been relieved by compression stockings. 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 No real need for Epipen for this alone.     GDM (gestational diabetes mellitus)     w first pregnancy only     Gestational hypertension      Lymphedema     jackson arms, chest     Migraine      Neuropathy associated with cancer (H)      Papillary carcinoma, follicular variant (H) 2013    pT3, N1, Mx, thyroid     Post-surgical hypothyroidism      Renal disease     kidney stones     Rhinitis, allergic to other allergen      Right Breast mass and microcalcifications 2011     Seasonal allergic conjunctivitis      Seasonal allergic rhinitis 11 skin tests pos. for: dog/M/T/G/W--NEGATIVE FOOD TESTS FOR: shrimp, crab, lobster, coconut     Past Surgical History:   Procedure Laterality Date     BACK SURGERY  ,    Bulging disc w nerve root impigment     BIOPSY BREAST      right     BIOPSY BREAST  11    right, core and sterotactic     BONE MARROW BIOPSY, BONE SPECIMEN, NEEDLE/TROCAR Left 10/3/2017    Procedure: BIOPSY BONE MARROW;  Bone Marrow Biopsy with aspirate;  Surgeon: Amelia Watkins PA-C;  Location: [facility]     BYPASS GASTRIC, CHOLECYSTECTOMY, COMBINED  2007 LAPAROSCOPIC GASTRIC RESTRICTIVE PX, W/GASTRIC BYPASS/ GAMALIEL-EN-Y, < 150CM      Gamaliel en Y?      SECTION       COLONOSCOPY      normal     COSMETIC SURGERY  2012    Final Stage of Mastectomy     DAVINCI HYSTERECTOMY TOTAL, BILATERAL SALPINGO-OOPHORECTOMY, COMBINED  2012    Procedure: COMBINED DAVINCI HYSTERECTOMY TOTAL, SALPINGO-OOPHORECTOMY;  Davinci Total Laparoscopic Hysterectomy, Bilateral Salpingo Oophorectomy, Pelvic Washings, Cystoscopy;  Surgeon: Evie Sheikh MD;  Location: [facility] OR     ENT SURGERY       ESOPHAGOSCOPY, GASTROSCOPY, DUODENOSCOPY (EGD), COMBINED N/A 2017    Procedure: COMBINED ENDOSCOPIC ULTRASOUND, ESOPHAGOSCOPY, GASTROSCOPY, DUODENOSCOPY (EGD), FINE NEEDLE ASPIRATE/BIOPSY;  Esophagogastroduodenoscopy, Endoscopic  Ultrasound, with fine needle biopsy aspirate;  Surgeon: Patrick Robles MD;  Location: [facility] OR     GI SURGERY  11-    Rachael-en Y w cholecystectomy     GYN SURGERY  1/6/89,1/10/92    2 c-sections     HYSTERECTOMY, PAP NO LONGER INDICATED  12/12    DeVinci assister lap hyst with BSO     INSERT PORT VASCULAR ACCESS Right 10/4/2017    Procedure: INSERT PORT VASCULAR ACCESS;  Port Placement;  Surgeon: Jc Goodman PA-C;  Location: [facility] OR     IRRIGATION AND DEBRIDEMENT BREAST  3/1/2012    Procedure:IRRIGATION AND DEBRIDEMENT BREAST; Irrigation and Debridement, Wound Closure Right Breast; Surgeon:JUNG GUILLEN; Location:[facility] OR     MASTECTOMY, RECONSTRUCT BREAST, COMBINED  1/30/2012    Procedure:COMBINED MASTECTOMY, RECONSTRUCT BREAST; Bilateral Mastectomies, Right Axillary Malcolm Node Biopsy, Bilateral Breast Reconstruction with Tissue Expanders, Reconstruction of inframammary fold, bilateral pain management systems; Surgeon:ANUPAM CAMPBELL; Location:[facility] OR     RECONSTRUCT BREAST  8/31/2012    Procedure: RECONSTRUCT BREAST;  Bilateral 2nd stage breast reconstruction, revision,       SOFT TISSUE SURGERY  1-30-12    Mastectomy w severe myofascial pain syndrome     THYROIDECTOMY  7/10/2013    Procedure: THYROIDECTOMY;  Total Thyroidectomy with central neck dissection;  Surgeon: Indiana Sneed MD;  Location: [facility] OR     TONSILLECTOMY      childhood       Medications  Current Outpatient Prescriptions   Medication Sig Dispense Refill     prochlorperazine (COMPAZINE) 10 MG tablet Take 1 tablet (10 mg) by mouth every 6 hours as needed for nausea or vomiting 60 tablet 3     ENOVARX-LIDOCAINE HCL 10 % CREA Externally apply 1 g topically 3 times daily as needed 60 g 3     fluconazole (DIFLUCAN) 200 MG tablet Take 1 tablet (200 mg) by mouth daily 30 tablet 0     cetirizine (ZYRTEC ALLERGY) 10 MG tablet Take 1 tablet (10 mg) by mouth 3 times daily On hold for lab test. 30 tablet 0     acyclovir (ZOVIRAX)  400 MG tablet Take 1 tablet (400 mg) by mouth 2 times daily 60 tablet 0     dexamethasone (DECADRON) 4 MG tablet Take 8 mg (2 tab) by mouth 11/1 and 11/2, then 4 mg (1 tab) by mouth 11/3. 5 tablet 0     diclofenac (VOLTAREN) 1 % GEL topical gel Apply affected area two times daily PRN using enclosed dosing card. 100 g 0     furosemide (LASIX) 20 MG tablet Take 2 tablets (40 mg) by mouth daily for 2 days 4 tablet 0     levofloxacin (LEVAQUIN) 250 MG tablet Take 1 tablet (250 mg) by mouth daily 30 tablet 0     allopurinol (ZYLOPRIM) 300 MG tablet Take 1 tablet (300 mg) by mouth daily 30 tablet 0     ferrous sulfate (IRON) 325 (65 FE) MG tablet Take 1 tablet (325 mg) by mouth 3 times daily (with meals) 90 tablet 0     bisacodyl (DULCOLAX) 5 MG EC tablet Take 3 tablets (15 mg) by mouth daily as needed for constipation 30 tablet 0     docusate sodium (COLACE) 100 MG tablet Take 100 mg by mouth daily 30 tablet 0     polyethylene glycol (MIRALAX) powder Take 17 g (1 capful) by mouth daily 510 g 0     magic mouthwash suspension (diphenhydrAMINE, lidocaine, aluminum-magnesium & simethicone) Swish and spit 10 mLs in mouth every 6 hours as needed for mouth sores 360 mL 1     cyclobenzaprine (FLEXERIL) 10 MG tablet Take 1 tablet (10 mg) by mouth 3 times daily as needed 90 tablet 0     senna-docusate (SENOKOT-S;PERICOLACE) 8.6-50 MG per tablet Take 1-2 tablets by mouth 2 times daily as needed for constipation 60 tablet 0     oxyCODONE (ROXICODONE) 30 MG IR tablet Take 1 tablet (30 mg) by mouth every 4 hours as needed for moderate to severe pain 180 tablet 0     morphine (MS CONTIN) 30 MG 12 hr tablet Take 1 tablet (30 mg) by mouth every 8 hours . Take an addition pill at night such that your evening dose is 60mg 120 tablet 0     UNABLE TO FIND Take 2 capsules by mouth 3 times daily Muscle Mag. 2 caps contain B1 20mg, B2 20mg, B6 10mg, magesium 20mg, manganese 2mg.       Morphine Sulfate (MS CONTIN PO) Take 60 mg by mouth daily  Takes at 8pm       lidocaine-prilocaine (EMLA) cream Apply topically as needed (port access pain.) 30 g 1     SUMAtriptan (IMITREX) 50 MG tablet Take 1 tablet (50 mg) by mouth at onset of headache for migraine (may repeat in 2 hours as needed, max 2 tablets daily) 9 tablet 3     calcitRIOL (ROCALTROL) 0.25 MCG capsule TAKE 2 CAPSULES BY MOUTH EVERY MORNING AND TAKE 1 CAPSULE BY MOUTH EVERY NIGHT AT BEDTIME (Patient taking differently: TAKE 2 CAPSULES BY MOUTH EVERY MORNING AND TAKE 1 CAPSULE BY MOUTH at noon) 270 capsule 3     celecoxib (CELEBREX) 200 MG capsule TAKE ONE CAPSULE BY MOUTH TWICE DAILY  56 capsule 0     order for DME Compression stockings, medium grade, please measure and fit. Please dispense a pair. 1 Units 0     diphenhydrAMINE (BENADRYL) 50 MG capsule Take 1 capsule (50 mg) by mouth nightly as needed for itching or sleep (Patient not taking: Reported on 11/3/2017) 56 capsule      methocarbamol (ROBAXIN) 750 MG tablet Take 1 tablet (750 mg) by mouth 4 times daily as needed . Ok to take a 5th Robaxin on very severe days. (Patient taking differently: Take 750 mg by mouth 4 times daily . Ok to take a 5th Robaxin on very severe days.) 360 tablet 1     ondansetron (ZOFRAN-ODT) 8 MG ODT tab Take 1 tablet (8 mg) by mouth every 8 hours as needed for nausea or vomiting 90 tablet 3     COMPOUND CONTAINING CONTROLLED SUBSTANCE (CMPD RX) - PHARMACY TO MIX COMPOUNDED MEDICATION Apply small amount to affected area two times daily. (Patient not taking: Reported on 11/3/2017) 120 g 6     diazepam (VALIUM) 5 MG tablet Take 1 tablet (5 mg) by mouth 3 times daily as needed For muscle spasms (Patient taking differently: Take 5 mg by mouth 3 times daily For muscle spasms) 90 tablet 2     levothyroxine (SYNTHROID/LEVOTHROID) 112 MCG tablet Mon-Sat: (2 tabs daily) Sunday: 3 tabs (Patient taking differently: 112 mcg Mon-Sat: (2 tabs daily) Sunday: 3 tabs) 189 tablet 3     hydrochlorothiazide (MICROZIDE) 12.5 MG capsule Take  1 capsule (12.5 mg) by mouth daily 90 capsule 2     montelukast (SINGULAIR) 10 MG tablet TAKE TWO TABLETS BY MOUTH TWICE DAILY 120 tablet 11     EPINEPHrine (EPIPEN 2-CARIDAD) 0.3 MG/0.3ML injection Inject 0.3 mLs (0.3 mg) into the muscle once as needed for anaphylaxis 0.6 mL 3     potassium chloride SA (POTASSIUM CHLORIDE) 20 MEQ CR tablet Take 1 tablet (20 mEq) by mouth daily 90 tablet 3     VENTOLIN  (90 BASE) MCG/ACT Inhaler INHALE 2 PUFFS INTO THE LUNGS EVERY 4 HOURS AS NEEDED FOR SHORTNESS OF BREATH OR DIFFICULT BREATHING OR WHEEZING (Patient not taking: Reported on 11/3/2017) 18 g 3     cromolyn (OPTICROM) 4 % ophthalmic solution Place 1 drop into both eyes 4 times daily 10 mL 5     NASALCROM 5.2 MG/ACT AERS Inhaler SPRAY ONE SPRAY( 1 ML) IN NOSTRIL DAILY 1 Bottle 6     Cholecalciferol (VITAMIN D3) 2000 UNITS CAPS Take 5,000 Units by mouth daily Takes 2 tabs daily 60 capsule 4     order for DME Equipment being ordered: compression stockings. 20-30 mm or 30 - 40 mm as patient can tolerate. Physical therapy to determine if they should be above or below the knee. 2 Units 4     order for DME Equipment being ordered: compression bra (Patient not taking: Reported on 11/3/2017) 2 Device 1     ranitidine (ZANTAC) 75 MG tablet Take 1 tablet (75 mg) by mouth 3 times daily 270 tablet 3     aluminum chloride (DRYSOL) 20 % external solution Apply topically At Bedtime (Patient not taking: Reported on 11/3/2017) 60 mL 3     tacrolimus (PROTOPIC) 0.1 % ointment Apply topically 2 times daily (Patient not taking: Reported on 11/3/2017) 60 g 3     triamcinolone (KENALOG) 0.025 % ointment Apply topically 2 times daily Mix with 1 lb jar of vaseline or aquaphor (Patient not taking: Reported on 11/3/2017) 80 g 3     UNABLE TO FIND MEDICATION NAME: Tumeric 3 capsules daily (on hold during chemo)       mometasone (ASMANEX 30 METERED DOSES) 110 MCG/INH inhaler Inhale 1 puff into the lungs daily (Patient not taking: Reported on  11/3/2017) 3 Inhaler 3     UNABLE TO FIND 2 tablets 3 times daily MEDICATION NAME: Natural D-Hist (on hold during chemo)       calcium citrate-vitamin D (CALCIUM CITRATE +D) 315-250 MG-UNIT TABS Take 2 tablets by mouth 3 times daily 120 tablet      calcium carbonate (TUMS) 500 MG chewable tablet Take 2 tablets (1,000 mg) by mouth nightly as needed for other (cramps) 180 chew tab 3     UNABLE TO FIND 3 tablets 3 times daily MEDICATION NAME: calcium D-Glucarate   3 caps contain 180mg of elemental calcium.       Triamcinolone Acetonide (AZMACORT IN) Inhale 2 puffs into the lungs as needed       UNABLE TO FIND 2 tablets 3 times daily MEDICATION NAME: Digestzymes        UNABLE TO FIND 1 tablet daily MEDICATION NAME: Pure Encapsulations       Omega-3 Fatty Acids (OMEGA 3 PO) Take 5 mLs by mouth       Probiotic Product (PROBIOTIC DAILY PO) Take 1 capsule by mouth daily Lacto acid bifidobacterium       She is back up to lasix 40 mg  Currently having a hard time getting the calcium in      Allergies  Allergies   Allergen Reactions     Baclofen Other (See Comments) and Unknown     Other reaction(s): Edema, chest pain, seizures.      No Clinical Screening - See Comments Shortness Of Breath, Palpitations, Anaphylaxis, Itching, Swelling, Difficulty breathing and Rash     Sukhjinder wipes- oral allergy -  July 2015: throat tightness from a Chinese herbal medicine Wilmer Tran     Serotonin Anxiety, Other (See Comments) and Swelling     Seizures      Amitriptyline Hcl Swelling     Birch Trees      Potatoes, carrots, cherries, celery, apple, pears, plums, peaches, parsnip, kiwi, hazelnuts, and apricots,      Blue Dyes Itching     Headaches       Cymbalta Other (See Comments)     Flushing, tremor/muscle twitching and edema     Gabapentin Other (See Comments)     edema  Systemic edema, weaned off from Feb to March per Dr. Dowd.    edema     Grass      Mugwort [Artemisia Vulgaris]      Various spices     Ragweeds      Melons,  bananas, cucumbers, zucchini.     Topamax      Nortriptyline Itching, Visual Disturbance, Swelling, GI Disturbance, Anxiety, Other (See Comments) and Nausea     Other reaction(s): Swelling       Family History  No thyroid  Family History   Problem Relation Age of Onset     Allergies Mother      Arthritis Mother      CANCER Mother      uterine/uterine     Hypertension Mother      on HCTZ     Hyperlipidemia Mother      CEREBROVASCULAR DISEASE Mother      s/p brain surgery     Breast Cancer Mother      Depression Mother      Anxiety Disorder Mother      Anesthesia Reaction Mother      Asthma Mother      Skin Cancer Mother      HEART DISEASE Father      DIABETES Father      Coronary Artery Disease Father      Hypertension Father      Hyperlipidemia Father      CEREBROVASCULAR DISEASE Father      Multiple strokes     Obesity Father      DIABETES Brother      Depression Brother      Hypertension Brother      CANCER Maternal Grandfather      pancreatic cancer/stomach cancer     Prostate Cancer Maternal Grandfather      Prostrate and Bladder     Other Cancer Maternal Grandfather      Pancreatic 1988, Bladder 1977     CANCER Maternal Grandmother      uterine     Breast Cancer Maternal Grandmother      Skin Cancer Maternal Grandmother      CANCER Brother 57     pancreatic cancer     DIABETES Brother      Breast Cancer Cousin      Grand mothers sister     Other Cancer Brother      Stage 3 Pancreatic 2-2015     Depression Brother      Asthma Brother      Obesity Brother      Anesthesia Reaction Other      H/a, itching, drug interactions     Asthma Other      CANCER Brother      Pancreatic      Thyroid Disease No family hx of        Social History  Social History   Substance Use Topics     Smoking status: Never Smoker     Smokeless tobacco: Never Used      Comment: no 2nd hand smoke exposure     Alcohol use No      Comment: rare     RN.  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     Physical Exam  There were no  vitals taken for this visit.  There is no height or weight on file to calculate BMI.  GENERAL :   middle aged woman.  She looks tired  SKIN: Normal color; Alopecia/ scarf on head  EYES: PER,No scleral icterus  NECK no masses  LUNGS: clear bilaterally  CARDIAC: RRR S1 S2  NEURO: awake, alert, responds appropriately to questions.  Moves all extremities;    DATA REVIEW    [facility]O [facility]YROID LABS[facility] Latest Ref Rng & Units 11/2/2017 7/12/2017   TSH 0.40 - 4.00 mU/L 0.22 (L) 0.02 (L)   T4 FREE 0.76 - 1.46 ng/dL 1.30 1.54 (H)     END[facility]YROID [facility] Latest Ref Rng & Units 4/26/2017   TSH 0.40 - 4.00 mU/L 0.50   T4 FREE 0.76 - 1.46 ng/dL 1.08       EXAMINATION: Ultrasound soft tissue neck on, 11/2/2017 5:37 PM      COMPARISON: Soft tissue neck on 4/26/2017     HISTORY:  Thyroid cancer (H) [C73 (ICD-10-CM)]; Postsurgical  hypothyroidism [E89.0 (ICD-10-CM)]; Papillary carcinoma, follicular  variant (H) [C73 (ICD-10-CM)]; Postsurgical hypoparathyroidism [E89.2  (ICD-10-CM)]       FINDINGS:     Lymph nodes are measured bilaterally with measurements given in  craniocaudal, transverse and AP dimensions as follows:     Right:  Level 1: No evidence of lymphadenopathy  Level 2: No evidence of lymphadenopathy  Level 3: No evidence of lymphadenopathy  Level 4: No evidence of lymphadenopathy  Level 5: No evidence of lymphadenopathy  Level 6: No evidence of lymphadenopathy     Left:  Level 1: No evidence of lymphadenopathy  Level 2: No evidence of lymphadenopathy  Level 3: No evidence of lymphadenopathy  Level 4: No evidence of lymphadenopathy  Level 5: No evidence of lymphadenopathy  Level 6: No evidence of lymphadenopathy         IMPRESSION:No sonographic evidence of significantly enlarged lymph  nodes in the neck.     I have personally reviewed the examination and initial interpretation  and I agree with the findings.     HAJA ELDER MD    ENDO [facility]UM [facility] Latest Ref Rng & Units 11/10/2017   CALCIUM 8.5 - 10.1 mg/dL 8.0 (L)    CALCIUM IONIZED 4.4 - 5.2 mg/dL    CALCIUM IONIZED WHOLE BLOOD 4.4 - 5.2 mg/dL    PHOSPHOROUS 2.5 - 4.5 mg/dL    MAGNESIUM 1.6 - 2.3 mg/dL    ALBUMIN 3.4 - 5.0 g/dL 2.6 (L)   BUN 7 - 30 mg/dL 16   CREATININE 0.52 - 1.04 mg/dL 0.90   PARATHYROID HORMONE INTACT 12 - 72 pg/mL    ALKPHOS 40 - 150 U/L 82   25 OH VIT D2 ug/L    25 OH VIT D3 ug/L    25 OH VIT D TOTAL 20 - 75 ug/L    VITAMIN D DEFICIENCY SCREENING 20 - 75 ug/L    PROTEIN, TOTAL 6.8 - 8.8 g/dL 6.0 (L)     [facility] Latest Ref Rng & Units 11/6/2017   CALCIUM 8.5 - 10.1 mg/dL 7.9 (L)   CALCIUM IONIZED 4.4 - 5.2 mg/dL    CALCIUM IONIZED WHOLE BLOOD 4.4 - 5.2 mg/dL    PHOSPHOROUS 2.5 - 4.5 mg/dL    MAGNESIUM 1.6 - 2.3 mg/dL    ALBUMIN 3.4 - 5.0 g/dL 2.7 (L)   BUN 7 - 30 mg/dL 17   CREATININE 0.52 - 1.04 mg/dL 0.73   PARATHYROID HORMONE INTACT 12 - 72 pg/mL    ALKPHOS 40 - 150 U/L 52   25 OH VIT D2 ug/L    25 OH VIT D3 ug/L    25 OH VIT D TOTAL 20 - 75 ug/L    VITAMIN D DEFICIENCY SCREENING 20 - 75 ug/L    PROTEIN, TOTAL 6.8 - 8.8 g/dL 5.2 (L)     END[facility]UM [facility] Latest Ref Rng & Units 11/3/2017   CALCIUM 8.5 - 10.1 mg/dL 8.6   CALCIUM IONIZED 4.4 - 5.2 mg/dL    CALCIUM IONIZED WHOLE BLOOD 4.4 - 5.2 mg/dL    PHOSPHOROUS 2.5 - 4.5 mg/dL    MAGNESIUM 1.6 - 2.3 mg/dL    ALBUMIN 3.4 - 5.0 g/dL 3.3 (L)   BUN 7 - 30 mg/dL 26   CREATININE 0.52 - 1.04 mg/dL 0.82   PARATHYROID HORMONE INTACT 12 - 72 pg/mL    ALKPHOS 40 - 150 U/L 75   25 OH VIT D2 ug/L    25 OH VIT D3 ug/L    25 OH VIT D TOTAL 20 - 75 ug/L    VITAMIN D DEFICIENCY SCREENING 20 - 75 ug/L    PROTEIN, TOTAL 6.8 - 8.8 g/dL 6.2 (L)     ENDO CAL[facility]UM LAB[facility] Latest Ref Rng & Units 11/2/2017   CALCIUM 8.5 - 10.1 mg/dL 8.8   CALCIUM IONIZED 4.4 - 5.2 mg/dL    CALCIUM IONIZED WHOLE BLOOD 4.4 - 5.2 mg/dL    PHOSPHOROUS 2.5 - 4.5 mg/dL 3.6   MAGNESIUM 1.6 - 2.3 mg/dL    ALBUMIN 3.4 - 5.0 g/dL 3.6   BUN 7 - 30 mg/dL 23   CREATININE 0.52 - 1.04 mg/dL 0.88   PARATHYROID HORMONE INTACT 12 - 72 pg/mL     ALKPHOS 40 - 150 U/L 61   25 OH VIT D2 ug/L <5   25 OH VIT D3 ug/L 48   25 OH VIT D TOTAL 20 - 75 ug/L <53   VITAMIN D DEFICIENCY SCREENING 20 - 75 ug/L    PROTEIN, TOTAL 6.8 - 8.8 g/dL 6.6 (L)

## 2017-11-14 LAB — LAB SCANNED RESULT: NORMAL

## 2017-11-15 DIAGNOSIS — C73 PAPILLARY CARCINOMA, FOLLICULAR VARIANT (H): Primary | ICD-10-CM

## 2017-11-15 DIAGNOSIS — E89.0 POSTSURGICAL HYPOTHYROIDISM: ICD-10-CM

## 2017-11-16 ENCOUNTER — ONCOLOGY VISIT (OUTPATIENT)
Dept: ONCOLOGY | Facility: CLINIC | Age: 57
End: 2017-11-16
Payer: COMMERCIAL

## 2017-11-16 ENCOUNTER — APPOINTMENT (OUTPATIENT)
Dept: LAB | Facility: CLINIC | Age: 57
End: 2017-11-16
Payer: COMMERCIAL

## 2017-11-16 ENCOUNTER — HOSPITAL ENCOUNTER (INPATIENT)
Facility: CLINIC | Age: 57
LOS: 5 days | Discharge: HOME | End: 2017-11-21
Attending: INTERNAL MEDICINE | Admitting: INTERNAL MEDICINE
Payer: COMMERCIAL

## 2017-11-16 VITALS
OXYGEN SATURATION: 98 % | TEMPERATURE: 98.1 F | BODY MASS INDEX: 31.27 KG/M2 | WEIGHT: 187.7 LBS | RESPIRATION RATE: 16 BRPM | SYSTOLIC BLOOD PRESSURE: 125 MMHG | HEART RATE: 121 BPM | HEIGHT: 65 IN | DIASTOLIC BLOOD PRESSURE: 73 MMHG

## 2017-11-16 DIAGNOSIS — R60.0 LOCALIZED EDEMA: Primary | ICD-10-CM

## 2017-11-16 DIAGNOSIS — D89.42 IDIOPATHIC MAST CELL ACTIVATION SYNDROME (H): Chronic | ICD-10-CM

## 2017-11-16 DIAGNOSIS — R11.2 NAUSEA AND VOMITING, INTRACTABILITY OF VOMITING NOT SPECIFIED, UNSPECIFIED VOMITING TYPE: ICD-10-CM

## 2017-11-16 DIAGNOSIS — R07.89 CHEST WALL PAIN: ICD-10-CM

## 2017-11-16 DIAGNOSIS — C85.10 HIGH GRADE B-CELL LYMPHOMA (H): ICD-10-CM

## 2017-11-16 DIAGNOSIS — G89.3 NEOPLASM RELATED PAIN: ICD-10-CM

## 2017-11-16 DIAGNOSIS — K12.30 STOMATITIS AND MUCOSITIS: ICD-10-CM

## 2017-11-16 DIAGNOSIS — M79.18 MYOFASCIAL PAIN: ICD-10-CM

## 2017-11-16 DIAGNOSIS — R11.2 NAUSEA WITH VOMITING: ICD-10-CM

## 2017-11-16 DIAGNOSIS — K12.1 STOMATITIS AND MUCOSITIS: ICD-10-CM

## 2017-11-16 LAB
ALBUMIN SERPL-MCNC: 2.7 G/DL (ref 3.4–5)
ALP SERPL-CCNC: 85 U/L (ref 40–150)
ALT SERPL W P-5'-P-CCNC: 26 U/L (ref 0–50)
ANION GAP SERPL CALCULATED.3IONS-SCNC: 8 MMOL/L (ref 3–14)
ANISOCYTOSIS BLD QL SMEAR: ABNORMAL
AST SERPL W P-5'-P-CCNC: 21 U/L (ref 0–45)
BASOPHILS # BLD AUTO: 0.2 10E9/L (ref 0–0.2)
BASOPHILS NFR BLD AUTO: 2.6 %
BILIRUB SERPL-MCNC: 0.2 MG/DL (ref 0.2–1.3)
BUN SERPL-MCNC: 14 MG/DL (ref 7–30)
CALCIUM SERPL-MCNC: 8.4 MG/DL (ref 8.5–10.1)
CHLORIDE SERPL-SCNC: 104 MMOL/L (ref 94–109)
CO2 SERPL-SCNC: 28 MMOL/L (ref 20–32)
CREAT SERPL-MCNC: 0.8 MG/DL (ref 0.52–1.04)
DACRYOCYTES BLD QL SMEAR: SLIGHT
DIFFERENTIAL METHOD BLD: ABNORMAL
EOSINOPHIL # BLD AUTO: 0 10E9/L (ref 0–0.7)
EOSINOPHIL NFR BLD AUTO: 0 %
ERYTHROCYTE [DISTWIDTH] IN BLOOD BY AUTOMATED COUNT: 21.5 % (ref 10–15)
GFR SERPL CREATININE-BSD FRML MDRD: 74 ML/MIN/1.7M2
GLUCOSE SERPL-MCNC: 143 MG/DL (ref 70–99)
HCT VFR BLD AUTO: 31.7 % (ref 35–47)
HGB BLD-MCNC: 9.9 G/DL (ref 11.7–15.7)
LDH SERPL L TO P-CCNC: 376 U/L (ref 81–234)
LYMPHOCYTES # BLD AUTO: 0.8 10E9/L (ref 0.8–5.3)
LYMPHOCYTES NFR BLD AUTO: 13.9 %
MCH RBC QN AUTO: 27.3 PG (ref 26.5–33)
MCHC RBC AUTO-ENTMCNC: 31.2 G/DL (ref 31.5–36.5)
MCV RBC AUTO: 88 FL (ref 78–100)
METAMYELOCYTES # BLD: 0.1 10E9/L
METAMYELOCYTES NFR BLD MANUAL: 0.9 %
MONOCYTES # BLD AUTO: 0.6 10E9/L (ref 0–1.3)
MONOCYTES NFR BLD AUTO: 10.4 %
MYELOCYTES # BLD: 0.1 10E9/L
MYELOCYTES NFR BLD MANUAL: 0.9 %
NEUTROPHILS # BLD AUTO: 4.3 10E9/L (ref 1.6–8.3)
NEUTROPHILS NFR BLD AUTO: 71.3 %
NRBC # BLD AUTO: 0.1 10*3/UL
NRBC BLD AUTO-RTO: 2 /100
OVALOCYTES BLD QL SMEAR: SLIGHT
PLATELET # BLD AUTO: 345 10E9/L (ref 150–450)
PLATELET # BLD EST: ABNORMAL 10*3/UL
POIKILOCYTOSIS BLD QL SMEAR: SLIGHT
POLYCHROMASIA BLD QL SMEAR: SLIGHT
POTASSIUM SERPL-SCNC: 3.8 MMOL/L (ref 3.4–5.3)
PROT SERPL-MCNC: 5.8 G/DL (ref 6.8–8.8)
RBC # BLD AUTO: 3.62 10E12/L (ref 3.8–5.2)
SODIUM SERPL-SCNC: 141 MMOL/L (ref 133–144)
URATE SERPL-MCNC: 6.1 MG/DL (ref 2.6–6)
WBC # BLD AUTO: 6.1 10E9/L (ref 4–11)

## 2017-11-16 PROCEDURE — 25000132 ZZH RX MED GY IP 250 OP 250 PS 637: Performed by: PHYSICIAN ASSISTANT

## 2017-11-16 PROCEDURE — 25000131 ZZH RX MED GY IP 250 OP 636 PS 637: Performed by: PHYSICIAN ASSISTANT

## 2017-11-16 PROCEDURE — 20000002 ZZH R&B BMT INTERMEDIATE

## 2017-11-16 PROCEDURE — 99214 OFFICE O/P EST MOD 30 MIN: CPT | Mod: ZP | Performed by: PHYSICIAN ASSISTANT

## 2017-11-16 PROCEDURE — 3E04305 INTRODUCTION OF OTHER ANTINEOPLASTIC INTO CENTRAL VEIN, PERCUTANEOUS APPROACH: ICD-10-PCS | Performed by: INTERNAL MEDICINE

## 2017-11-16 PROCEDURE — 25000125 ZZHC RX 250: Performed by: PHYSICIAN ASSISTANT

## 2017-11-16 PROCEDURE — 25000128 H RX IP 250 OP 636: Performed by: PHYSICIAN ASSISTANT

## 2017-11-16 RX ORDER — LEVOTHYROXINE SODIUM 112 UG/1
112 TABLET ORAL
Status: DISCONTINUED | OUTPATIENT
Start: 2017-11-17 | End: 2017-11-16

## 2017-11-16 RX ORDER — ALBUTEROL SULFATE 90 UG/1
1 AEROSOL, METERED RESPIRATORY (INHALATION) EVERY 4 HOURS PRN
Status: DISCONTINUED | OUTPATIENT
Start: 2017-11-16 | End: 2017-11-21 | Stop reason: HOSPADM

## 2017-11-16 RX ORDER — POTASSIUM CHLORIDE 750 MG/1
20 TABLET, EXTENDED RELEASE ORAL DAILY
Status: DISCONTINUED | OUTPATIENT
Start: 2017-11-17 | End: 2017-11-16

## 2017-11-16 RX ORDER — ZINC OXIDE 13 %
1 CREAM (GRAM) TOPICAL EVERY EVENING
Status: DISCONTINUED | OUTPATIENT
Start: 2017-11-16 | End: 2017-11-21 | Stop reason: HOSPADM

## 2017-11-16 RX ORDER — ALLOPURINOL 300 MG/1
300 TABLET ORAL DAILY
Status: CANCELLED | OUTPATIENT
Start: 2017-11-16

## 2017-11-16 RX ORDER — POTASSIUM CHLORIDE 750 MG/1
20-40 TABLET, EXTENDED RELEASE ORAL
Status: DISCONTINUED | OUTPATIENT
Start: 2017-11-16 | End: 2017-11-21 | Stop reason: HOSPADM

## 2017-11-16 RX ORDER — HEPARIN SODIUM (PORCINE) LOCK FLUSH IV SOLN 100 UNIT/ML 100 UNIT/ML
5 SOLUTION INTRAVENOUS
Status: DISCONTINUED | OUTPATIENT
Start: 2017-11-16 | End: 2017-11-21 | Stop reason: HOSPADM

## 2017-11-16 RX ORDER — POTASSIUM CHLORIDE 1.5 G/1.58G
20-40 POWDER, FOR SOLUTION ORAL
Status: DISCONTINUED | OUTPATIENT
Start: 2017-11-16 | End: 2017-11-21 | Stop reason: HOSPADM

## 2017-11-16 RX ORDER — MORPHINE SULFATE 60 MG/1
60 TABLET, FILM COATED, EXTENDED RELEASE ORAL DAILY
Status: DISCONTINUED | OUTPATIENT
Start: 2017-11-16 | End: 2017-11-16

## 2017-11-16 RX ORDER — LORAZEPAM 2 MG/ML
.5-1 INJECTION INTRAMUSCULAR EVERY 6 HOURS PRN
Status: DISCONTINUED | OUTPATIENT
Start: 2017-11-16 | End: 2017-11-21 | Stop reason: HOSPADM

## 2017-11-16 RX ORDER — HYDROCHLOROTHIAZIDE 12.5 MG/1
12.5 CAPSULE ORAL DAILY
Status: DISCONTINUED | OUTPATIENT
Start: 2017-11-17 | End: 2017-11-16

## 2017-11-16 RX ORDER — PROCHLORPERAZINE MALEATE 5 MG
5-10 TABLET ORAL EVERY 6 HOURS PRN
Status: DISCONTINUED | OUTPATIENT
Start: 2017-11-16 | End: 2017-11-21 | Stop reason: HOSPADM

## 2017-11-16 RX ORDER — ACYCLOVIR 400 MG/1
400 TABLET ORAL 2 TIMES DAILY
Status: DISCONTINUED | OUTPATIENT
Start: 2017-11-16 | End: 2017-11-16

## 2017-11-16 RX ORDER — BISACODYL 5 MG
15 TABLET, DELAYED RELEASE (ENTERIC COATED) ORAL DAILY PRN
Status: DISCONTINUED | OUTPATIENT
Start: 2017-11-16 | End: 2017-11-21 | Stop reason: HOSPADM

## 2017-11-16 RX ORDER — POTASSIUM CHLORIDE 7.45 MG/ML
10 INJECTION INTRAVENOUS
Status: DISCONTINUED | OUTPATIENT
Start: 2017-11-16 | End: 2017-11-21 | Stop reason: HOSPADM

## 2017-11-16 RX ORDER — LEVOTHYROXINE SODIUM 112 UG/1
224 TABLET ORAL
Status: DISCONTINUED | OUTPATIENT
Start: 2017-11-17 | End: 2017-11-21 | Stop reason: HOSPADM

## 2017-11-16 RX ORDER — DIAZEPAM 5 MG
5 TABLET ORAL 3 TIMES DAILY
Status: DISCONTINUED | OUTPATIENT
Start: 2017-11-16 | End: 2017-11-21 | Stop reason: HOSPADM

## 2017-11-16 RX ORDER — LORAZEPAM 0.5 MG/1
.5-1 TABLET ORAL EVERY 6 HOURS PRN
Status: DISCONTINUED | OUTPATIENT
Start: 2017-11-16 | End: 2017-11-16

## 2017-11-16 RX ORDER — CALCIUM CARBONATE 500 MG/1
750 TABLET, CHEWABLE ORAL
Status: DISCONTINUED | OUTPATIENT
Start: 2017-11-16 | End: 2017-11-21 | Stop reason: HOSPADM

## 2017-11-16 RX ORDER — DIPHENHYDRAMINE HCL 50 MG
50 CAPSULE ORAL ONCE
Status: COMPLETED | OUTPATIENT
Start: 2017-11-16 | End: 2017-11-16

## 2017-11-16 RX ORDER — LORAZEPAM 0.5 MG/1
.5-1 TABLET ORAL EVERY 6 HOURS PRN
Status: DISCONTINUED | OUTPATIENT
Start: 2017-11-16 | End: 2017-11-21 | Stop reason: HOSPADM

## 2017-11-16 RX ORDER — MEPERIDINE HYDROCHLORIDE 25 MG/ML
25 INJECTION INTRAMUSCULAR; INTRAVENOUS; SUBCUTANEOUS EVERY 30 MIN PRN
Status: CANCELLED | OUTPATIENT
Start: 2017-11-16

## 2017-11-16 RX ORDER — POTASSIUM CHLORIDE 750 MG/1
20 TABLET, EXTENDED RELEASE ORAL DAILY
Status: DISCONTINUED | OUTPATIENT
Start: 2017-11-17 | End: 2017-11-21 | Stop reason: HOSPADM

## 2017-11-16 RX ORDER — CALCITRIOL 0.25 UG/1
0.25 CAPSULE, LIQUID FILLED ORAL 2 TIMES DAILY
Status: DISCONTINUED | OUTPATIENT
Start: 2017-11-17 | End: 2017-11-16

## 2017-11-16 RX ORDER — ONDANSETRON 8 MG/1
8 TABLET, FILM COATED ORAL EVERY 8 HOURS PRN
Status: DISCONTINUED | OUTPATIENT
Start: 2017-11-16 | End: 2017-11-21 | Stop reason: HOSPADM

## 2017-11-16 RX ORDER — LIDOCAINE 40 MG/G
CREAM TOPICAL
Status: DISCONTINUED | OUTPATIENT
Start: 2017-11-16 | End: 2017-11-21 | Stop reason: HOSPADM

## 2017-11-16 RX ORDER — SODIUM CHLORIDE 9 MG/ML
1000 INJECTION, SOLUTION INTRAVENOUS CONTINUOUS PRN
Status: CANCELLED
Start: 2017-11-16

## 2017-11-16 RX ORDER — FERROUS SULFATE 325(65) MG
325 TABLET ORAL
Status: DISCONTINUED | OUTPATIENT
Start: 2017-11-16 | End: 2017-11-16

## 2017-11-16 RX ORDER — FUROSEMIDE 20 MG
20 TABLET ORAL DAILY
Status: DISCONTINUED | OUTPATIENT
Start: 2017-11-17 | End: 2017-11-17

## 2017-11-16 RX ORDER — CYCLOBENZAPRINE HCL 5 MG
5-10 TABLET ORAL 3 TIMES DAILY PRN
Status: DISCONTINUED | OUTPATIENT
Start: 2017-11-16 | End: 2017-11-21 | Stop reason: HOSPADM

## 2017-11-16 RX ORDER — METHYLPREDNISOLONE SODIUM SUCCINATE 125 MG/2ML
125 INJECTION, POWDER, LYOPHILIZED, FOR SOLUTION INTRAMUSCULAR; INTRAVENOUS
Status: CANCELLED
Start: 2017-11-16

## 2017-11-16 RX ORDER — DIPHENHYDRAMINE HYDROCHLORIDE AND LIDOCAINE HYDROCHLORIDE AND ALUMINUM HYDROXIDE AND MAGNESIUM HYDRO
10 KIT EVERY 6 HOURS PRN
Status: DISCONTINUED | OUTPATIENT
Start: 2017-11-16 | End: 2017-11-21 | Stop reason: HOSPADM

## 2017-11-16 RX ORDER — POLYETHYLENE GLYCOL 3350 17 G/17G
17 POWDER, FOR SOLUTION ORAL DAILY
Status: DISCONTINUED | OUTPATIENT
Start: 2017-11-16 | End: 2017-11-16

## 2017-11-16 RX ORDER — POLYETHYLENE GLYCOL 3350 17 G/17G
17 POWDER, FOR SOLUTION ORAL DAILY
Status: DISCONTINUED | OUTPATIENT
Start: 2017-11-17 | End: 2017-11-16

## 2017-11-16 RX ORDER — METHOCARBAMOL 750 MG/1
750 TABLET, FILM COATED ORAL 4 TIMES DAILY
Status: DISCONTINUED | OUTPATIENT
Start: 2017-11-16 | End: 2017-11-21 | Stop reason: HOSPADM

## 2017-11-16 RX ORDER — ALBUTEROL SULFATE 0.83 MG/ML
2.5 SOLUTION RESPIRATORY (INHALATION)
Status: DISCONTINUED | OUTPATIENT
Start: 2017-11-16 | End: 2017-11-21 | Stop reason: HOSPADM

## 2017-11-16 RX ORDER — ACETAMINOPHEN 325 MG/1
650 TABLET ORAL ONCE
Status: COMPLETED | OUTPATIENT
Start: 2017-11-16 | End: 2017-11-16

## 2017-11-16 RX ORDER — CELECOXIB 100 MG/1
200 CAPSULE ORAL 2 TIMES DAILY
Status: DISCONTINUED | OUTPATIENT
Start: 2017-11-16 | End: 2017-11-16

## 2017-11-16 RX ORDER — ALLOPURINOL 300 MG/1
300 TABLET ORAL DAILY
Status: DISCONTINUED | OUTPATIENT
Start: 2017-11-17 | End: 2017-11-16

## 2017-11-16 RX ORDER — HEPARIN SODIUM (PORCINE) LOCK FLUSH IV SOLN 100 UNIT/ML 100 UNIT/ML
5 SOLUTION INTRAVENOUS ONCE
Status: COMPLETED | OUTPATIENT
Start: 2017-11-16 | End: 2017-11-16

## 2017-11-16 RX ORDER — ACETAMINOPHEN 325 MG/1
650 TABLET ORAL ONCE
Status: CANCELLED
Start: 2017-11-16

## 2017-11-16 RX ORDER — LEVOTHYROXINE SODIUM 112 UG/1
336 TABLET ORAL WEEKLY
Status: DISCONTINUED | OUTPATIENT
Start: 2017-11-19 | End: 2017-11-21 | Stop reason: HOSPADM

## 2017-11-16 RX ORDER — ONDANSETRON 2 MG/ML
8 INJECTION INTRAMUSCULAR; INTRAVENOUS EVERY 8 HOURS PRN
Status: DISCONTINUED | OUTPATIENT
Start: 2017-11-16 | End: 2017-11-21 | Stop reason: HOSPADM

## 2017-11-16 RX ORDER — ONDANSETRON 8 MG/1
16 TABLET, FILM COATED ORAL EVERY 24 HOURS
Status: COMPLETED | OUTPATIENT
Start: 2017-11-16 | End: 2017-11-20

## 2017-11-16 RX ORDER — ALBUTEROL SULFATE 90 UG/1
1-2 AEROSOL, METERED RESPIRATORY (INHALATION)
Status: CANCELLED
Start: 2017-11-16

## 2017-11-16 RX ORDER — LORAZEPAM 2 MG/ML
.5-1 INJECTION INTRAMUSCULAR EVERY 6 HOURS PRN
Status: CANCELLED | OUTPATIENT
Start: 2017-11-16

## 2017-11-16 RX ORDER — MONTELUKAST SODIUM 10 MG/1
20 TABLET ORAL 2 TIMES DAILY
Status: DISCONTINUED | OUTPATIENT
Start: 2017-11-16 | End: 2017-11-16

## 2017-11-16 RX ORDER — CALCITRIOL 0.5 UG/1
0.5 CAPSULE, LIQUID FILLED ORAL
Status: DISCONTINUED | OUTPATIENT
Start: 2017-11-17 | End: 2017-11-21 | Stop reason: HOSPADM

## 2017-11-16 RX ORDER — POTASSIUM CHLORIDE 29.8 MG/ML
20 INJECTION INTRAVENOUS
Status: DISCONTINUED | OUTPATIENT
Start: 2017-11-16 | End: 2017-11-16

## 2017-11-16 RX ORDER — ONDANSETRON 8 MG/1
16 TABLET, FILM COATED ORAL EVERY 24 HOURS
Status: CANCELLED
Start: 2017-11-17

## 2017-11-16 RX ORDER — DIPHENHYDRAMINE HCL 50 MG
50 CAPSULE ORAL
Status: DISCONTINUED | OUTPATIENT
Start: 2017-11-16 | End: 2017-11-21 | Stop reason: HOSPADM

## 2017-11-16 RX ORDER — DEXAMETHASONE 4 MG/1
8 TABLET ORAL DAILY
Status: DISCONTINUED | OUTPATIENT
Start: 2017-11-22 | End: 2017-11-21 | Stop reason: HOSPADM

## 2017-11-16 RX ORDER — CALCITRIOL 0.25 UG/1
0.25 CAPSULE, LIQUID FILLED ORAL EVERY MORNING
Status: DISCONTINUED | OUTPATIENT
Start: 2017-11-17 | End: 2017-11-21 | Stop reason: HOSPADM

## 2017-11-16 RX ORDER — ALBUTEROL SULFATE 0.83 MG/ML
2.5 SOLUTION RESPIRATORY (INHALATION)
Status: CANCELLED | OUTPATIENT
Start: 2017-11-16

## 2017-11-16 RX ORDER — MORPHINE SULFATE 15 MG/1
30 TABLET, FILM COATED, EXTENDED RELEASE ORAL 2 TIMES DAILY
Status: DISCONTINUED | OUTPATIENT
Start: 2017-11-17 | End: 2017-11-21 | Stop reason: HOSPADM

## 2017-11-16 RX ORDER — DIPHENHYDRAMINE HCL 25 MG
50 CAPSULE ORAL ONCE
Status: CANCELLED
Start: 2017-11-16

## 2017-11-16 RX ORDER — MEPERIDINE HYDROCHLORIDE 50 MG/ML
25 INJECTION INTRAMUSCULAR; INTRAVENOUS; SUBCUTANEOUS EVERY 30 MIN PRN
Status: DISCONTINUED | OUTPATIENT
Start: 2017-11-16 | End: 2017-11-21 | Stop reason: HOSPADM

## 2017-11-16 RX ORDER — ACYCLOVIR 400 MG/1
400 TABLET ORAL 2 TIMES DAILY
Status: DISCONTINUED | OUTPATIENT
Start: 2017-11-16 | End: 2017-11-21 | Stop reason: HOSPADM

## 2017-11-16 RX ORDER — DEXAMETHASONE 4 MG/1
8 TABLET ORAL DAILY
Status: CANCELLED
Start: 2017-11-22

## 2017-11-16 RX ORDER — CYCLOBENZAPRINE HCL 5 MG
10 TABLET ORAL 3 TIMES DAILY PRN
Status: DISCONTINUED | OUTPATIENT
Start: 2017-11-16 | End: 2017-11-16

## 2017-11-16 RX ORDER — DIAZEPAM 5 MG
5 TABLET ORAL 3 TIMES DAILY PRN
Status: DISCONTINUED | OUTPATIENT
Start: 2017-11-16 | End: 2017-11-16

## 2017-11-16 RX ORDER — ACETAMINOPHEN 325 MG/1
650 TABLET ORAL EVERY 4 HOURS PRN
Status: DISCONTINUED | OUTPATIENT
Start: 2017-11-16 | End: 2017-11-21 | Stop reason: HOSPADM

## 2017-11-16 RX ORDER — LORAZEPAM 0.5 MG/1
.5-1 TABLET ORAL EVERY 6 HOURS PRN
Status: CANCELLED
Start: 2017-11-16

## 2017-11-16 RX ORDER — EPINEPHRINE 1 MG/ML
0.3 INJECTION, SOLUTION, CONCENTRATE INTRAVENOUS EVERY 5 MIN PRN
Status: CANCELLED | OUTPATIENT
Start: 2017-11-16

## 2017-11-16 RX ORDER — AMOXICILLIN 250 MG
2 CAPSULE ORAL 2 TIMES DAILY
Status: DISCONTINUED | OUTPATIENT
Start: 2017-11-16 | End: 2017-11-16

## 2017-11-16 RX ORDER — BISACODYL 5 MG
5 TABLET, DELAYED RELEASE (ENTERIC COATED) ORAL 2 TIMES DAILY
Status: DISCONTINUED | OUTPATIENT
Start: 2017-11-16 | End: 2017-11-21 | Stop reason: HOSPADM

## 2017-11-16 RX ORDER — DIPHENHYDRAMINE HYDROCHLORIDE 50 MG/ML
50 INJECTION INTRAMUSCULAR; INTRAVENOUS
Status: CANCELLED
Start: 2017-11-16

## 2017-11-16 RX ORDER — FERROUS SULFATE 325(65) MG
325 TABLET ORAL
Status: DISCONTINUED | OUTPATIENT
Start: 2017-11-16 | End: 2017-11-21 | Stop reason: HOSPADM

## 2017-11-16 RX ORDER — MORPHINE SULFATE 60 MG/1
60 TABLET, FILM COATED, EXTENDED RELEASE ORAL DAILY
Status: DISCONTINUED | OUTPATIENT
Start: 2017-11-16 | End: 2017-11-21 | Stop reason: HOSPADM

## 2017-11-16 RX ORDER — OXYCODONE HYDROCHLORIDE 10 MG/1
30 TABLET ORAL EVERY 4 HOURS PRN
Status: DISCONTINUED | OUTPATIENT
Start: 2017-11-16 | End: 2017-11-21 | Stop reason: HOSPADM

## 2017-11-16 RX ORDER — CETIRIZINE HYDROCHLORIDE 10 MG/1
10 TABLET ORAL 3 TIMES DAILY
Status: DISCONTINUED | OUTPATIENT
Start: 2017-11-16 | End: 2017-11-21 | Stop reason: HOSPADM

## 2017-11-16 RX ORDER — FLUCONAZOLE 200 MG/1
200 TABLET ORAL DAILY
Status: DISCONTINUED | OUTPATIENT
Start: 2017-11-17 | End: 2017-11-21 | Stop reason: HOSPADM

## 2017-11-16 RX ORDER — DOCUSATE SODIUM 100 MG/1
100 CAPSULE, LIQUID FILLED ORAL 3 TIMES DAILY
Status: DISCONTINUED | OUTPATIENT
Start: 2017-11-16 | End: 2017-11-21 | Stop reason: HOSPADM

## 2017-11-16 RX ORDER — HYDROCHLOROTHIAZIDE 12.5 MG/1
12.5 CAPSULE ORAL DAILY
Status: DISCONTINUED | OUTPATIENT
Start: 2017-11-17 | End: 2017-11-21 | Stop reason: HOSPADM

## 2017-11-16 RX ORDER — EPINEPHRINE 1 MG/ML
0.3 INJECTION, SOLUTION, CONCENTRATE INTRAVENOUS EVERY 5 MIN PRN
Status: DISCONTINUED | OUTPATIENT
Start: 2017-11-16 | End: 2017-11-21 | Stop reason: HOSPADM

## 2017-11-16 RX ORDER — POTASSIUM CL/LIDO/0.9 % NACL 10MEQ/0.1L
10 INTRAVENOUS SOLUTION, PIGGYBACK (ML) INTRAVENOUS
Status: DISCONTINUED | OUTPATIENT
Start: 2017-11-16 | End: 2017-11-21 | Stop reason: HOSPADM

## 2017-11-16 RX ORDER — METHYLPREDNISOLONE SODIUM SUCCINATE 125 MG/2ML
125 INJECTION, POWDER, LYOPHILIZED, FOR SOLUTION INTRAMUSCULAR; INTRAVENOUS
Status: DISCONTINUED | OUTPATIENT
Start: 2017-11-16 | End: 2017-11-21 | Stop reason: HOSPADM

## 2017-11-16 RX ORDER — NALOXONE HYDROCHLORIDE 0.4 MG/ML
.1-.4 INJECTION, SOLUTION INTRAMUSCULAR; INTRAVENOUS; SUBCUTANEOUS
Status: DISCONTINUED | OUTPATIENT
Start: 2017-11-16 | End: 2017-11-21 | Stop reason: HOSPADM

## 2017-11-16 RX ORDER — HEPARIN SODIUM,PORCINE 10 UNIT/ML
5-10 VIAL (ML) INTRAVENOUS EVERY 24 HOURS
Status: DISCONTINUED | OUTPATIENT
Start: 2017-11-16 | End: 2017-11-21 | Stop reason: HOSPADM

## 2017-11-16 RX ORDER — CROMOLYN SODIUM 5.2 MG
1 AEROSOL, SPRAY WITH PUMP (ML) NASAL DAILY
Status: DISCONTINUED | OUTPATIENT
Start: 2017-11-17 | End: 2017-11-16

## 2017-11-16 RX ORDER — MAGNESIUM SULFATE HEPTAHYDRATE 40 MG/ML
4 INJECTION, SOLUTION INTRAVENOUS EVERY 4 HOURS PRN
Status: DISCONTINUED | OUTPATIENT
Start: 2017-11-16 | End: 2017-11-21 | Stop reason: HOSPADM

## 2017-11-16 RX ORDER — AMOXICILLIN 250 MG
1-2 CAPSULE ORAL 2 TIMES DAILY PRN
Status: DISCONTINUED | OUTPATIENT
Start: 2017-11-16 | End: 2017-11-21 | Stop reason: HOSPADM

## 2017-11-16 RX ORDER — FLUCONAZOLE 200 MG/1
200 TABLET ORAL DAILY
Status: DISCONTINUED | OUTPATIENT
Start: 2017-11-17 | End: 2017-11-16

## 2017-11-16 RX ORDER — ONDANSETRON 8 MG/1
8 TABLET, ORALLY DISINTEGRATING ORAL EVERY 8 HOURS PRN
Status: DISCONTINUED | OUTPATIENT
Start: 2017-11-16 | End: 2017-11-21 | Stop reason: HOSPADM

## 2017-11-16 RX ORDER — POLYETHYLENE GLYCOL 3350 17 G/17G
17 POWDER, FOR SOLUTION ORAL 3 TIMES DAILY
Status: DISCONTINUED | OUTPATIENT
Start: 2017-11-16 | End: 2017-11-21 | Stop reason: HOSPADM

## 2017-11-16 RX ORDER — LORAZEPAM 2 MG/ML
.5-1 INJECTION INTRAMUSCULAR EVERY 6 HOURS PRN
Status: DISCONTINUED | OUTPATIENT
Start: 2017-11-16 | End: 2017-11-16

## 2017-11-16 RX ORDER — HEPARIN SODIUM,PORCINE 10 UNIT/ML
5-10 VIAL (ML) INTRAVENOUS
Status: DISCONTINUED | OUTPATIENT
Start: 2017-11-16 | End: 2017-11-21 | Stop reason: HOSPADM

## 2017-11-16 RX ORDER — PROCHLORPERAZINE MALEATE 5 MG
10 TABLET ORAL EVERY 6 HOURS PRN
Status: DISCONTINUED | OUTPATIENT
Start: 2017-11-16 | End: 2017-11-16

## 2017-11-16 RX ORDER — ALLOPURINOL 300 MG/1
300 TABLET ORAL DAILY
Status: DISCONTINUED | OUTPATIENT
Start: 2017-11-16 | End: 2017-11-16

## 2017-11-16 RX ORDER — PROCHLORPERAZINE MALEATE 10 MG
10 TABLET ORAL EVERY 6 HOURS PRN
Status: CANCELLED
Start: 2017-11-16

## 2017-11-16 RX ORDER — ALBUTEROL SULFATE 90 UG/1
1-2 AEROSOL, METERED RESPIRATORY (INHALATION)
Status: DISCONTINUED | OUTPATIENT
Start: 2017-11-16 | End: 2017-11-21 | Stop reason: HOSPADM

## 2017-11-16 RX ORDER — DOCUSATE SODIUM 100 MG/1
100 CAPSULE, LIQUID FILLED ORAL DAILY
Status: DISCONTINUED | OUTPATIENT
Start: 2017-11-17 | End: 2017-11-16

## 2017-11-16 RX ORDER — METHOCARBAMOL 750 MG/1
750 TABLET, FILM COATED ORAL 4 TIMES DAILY PRN
Status: DISCONTINUED | OUTPATIENT
Start: 2017-11-16 | End: 2017-11-16

## 2017-11-16 RX ORDER — CETIRIZINE HYDROCHLORIDE 10 MG/1
10 TABLET ORAL 3 TIMES DAILY
Status: DISCONTINUED | OUTPATIENT
Start: 2017-11-16 | End: 2017-11-16

## 2017-11-16 RX ORDER — MONTELUKAST SODIUM 10 MG/1
20 TABLET ORAL 2 TIMES DAILY
Status: DISCONTINUED | OUTPATIENT
Start: 2017-11-16 | End: 2017-11-21 | Stop reason: HOSPADM

## 2017-11-16 RX ORDER — SODIUM CHLORIDE 9 MG/ML
1000 INJECTION, SOLUTION INTRAVENOUS CONTINUOUS PRN
Status: DISCONTINUED | OUTPATIENT
Start: 2017-11-16 | End: 2017-11-21 | Stop reason: HOSPADM

## 2017-11-16 RX ORDER — AMOXICILLIN 250 MG
2 CAPSULE ORAL 2 TIMES DAILY
Status: CANCELLED | OUTPATIENT
Start: 2017-11-16

## 2017-11-16 RX ORDER — MORPHINE SULFATE 15 MG/1
30 TABLET, FILM COATED, EXTENDED RELEASE ORAL EVERY 8 HOURS
Status: DISCONTINUED | OUTPATIENT
Start: 2017-11-16 | End: 2017-11-16

## 2017-11-16 RX ORDER — CROMOLYN SODIUM 40 MG/ML
1 SOLUTION/ DROPS OPHTHALMIC 4 TIMES DAILY
Status: DISCONTINUED | OUTPATIENT
Start: 2017-11-16 | End: 2017-11-16

## 2017-11-16 RX ORDER — CROMOLYN SODIUM 40 MG/ML
1 SOLUTION/ DROPS OPHTHALMIC 4 TIMES DAILY
Status: DISCONTINUED | OUTPATIENT
Start: 2017-11-17 | End: 2017-11-21 | Stop reason: HOSPADM

## 2017-11-16 RX ORDER — ALLOPURINOL 300 MG/1
300 TABLET ORAL DAILY
Status: DISCONTINUED | OUTPATIENT
Start: 2017-11-17 | End: 2017-11-21 | Stop reason: HOSPADM

## 2017-11-16 RX ORDER — DIPHENHYDRAMINE HYDROCHLORIDE 50 MG/ML
50 INJECTION INTRAMUSCULAR; INTRAVENOUS
Status: DISCONTINUED | OUTPATIENT
Start: 2017-11-16 | End: 2017-11-21 | Stop reason: HOSPADM

## 2017-11-16 RX ORDER — AMOXICILLIN 250 MG
2 CAPSULE ORAL 3 TIMES DAILY
Status: DISCONTINUED | OUTPATIENT
Start: 2017-11-16 | End: 2017-11-21 | Stop reason: HOSPADM

## 2017-11-16 RX ORDER — CELECOXIB 100 MG/1
200 CAPSULE ORAL 2 TIMES DAILY
Status: DISCONTINUED | OUTPATIENT
Start: 2017-11-16 | End: 2017-11-21 | Stop reason: HOSPADM

## 2017-11-16 RX ADMIN — MORPHINE SULFATE 60 MG: 60 TABLET, EXTENDED RELEASE ORAL at 21:04

## 2017-11-16 RX ADMIN — DOXORUBICIN HYDROCHLORIDE 0.8 MG: 2 INJECTION, SOLUTION INTRAVENOUS at 19:43

## 2017-11-16 RX ADMIN — POLYETHYLENE GLYCOL 3350 17 G: 17 POWDER, FOR SOLUTION ORAL at 17:50

## 2017-11-16 RX ADMIN — PROCHLORPERAZINE MALEATE 5 MG: 5 TABLET, FILM COATED ORAL at 20:04

## 2017-11-16 RX ADMIN — FERROUS SULFATE TAB 325 MG (65 MG ELEMENTAL FE) 325 MG: 325 (65 FE) TAB at 17:48

## 2017-11-16 RX ADMIN — DIPHENHYDRAMINE HYDROCHLORIDE 50 MG: 50 CAPSULE ORAL at 17:16

## 2017-11-16 RX ADMIN — Medication 1 CAPSULE: at 17:52

## 2017-11-16 RX ADMIN — ACETAMINOPHEN 650 MG: 325 TABLET, FILM COATED ORAL at 17:17

## 2017-11-16 RX ADMIN — RITUXIMAB 750 MG: 10 INJECTION, SOLUTION INTRAVENOUS at 17:41

## 2017-11-16 RX ADMIN — Medication 5000 UNITS: at 17:54

## 2017-11-16 RX ADMIN — SENNOSIDES AND DOCUSATE SODIUM 2 TABLET: 8.6; 5 TABLET ORAL at 17:49

## 2017-11-16 RX ADMIN — ETOPOSIDE 100 MG: 20 INJECTION, SOLUTION, CONCENTRATE INTRAVENOUS at 19:42

## 2017-11-16 RX ADMIN — ONDANSETRON HYDROCHLORIDE 16 MG: 8 TABLET, FILM COATED ORAL at 18:42

## 2017-11-16 RX ADMIN — PREDNISONE 60 MG: 50 TABLET ORAL at 18:43

## 2017-11-16 RX ADMIN — BISACODYL 5 MG: 5 TABLET, COATED ORAL at 17:50

## 2017-11-16 RX ADMIN — CELECOXIB 200 MG: 100 CAPSULE ORAL at 17:49

## 2017-11-16 RX ADMIN — SODIUM CHLORIDE, PRESERVATIVE FREE 5 ML: 5 INJECTION INTRAVENOUS at 06:39

## 2017-11-16 RX ADMIN — Medication 2 TABLET: at 17:56

## 2017-11-16 RX ADMIN — OXYCODONE HYDROCHLORIDE 30 MG: 10 TABLET ORAL at 20:01

## 2017-11-16 RX ADMIN — ACYCLOVIR 400 MG: 400 TABLET ORAL at 17:49

## 2017-11-16 RX ADMIN — DOCUSATE SODIUM 100 MG: 100 CAPSULE, LIQUID FILLED ORAL at 17:48

## 2017-11-16 RX ADMIN — METHOCARBAMOL 750 MG: 750 TABLET ORAL at 17:49

## 2017-11-16 RX ADMIN — DIAZEPAM 5 MG: 5 TABLET ORAL at 17:48

## 2017-11-16 RX ADMIN — RANITIDINE 75 MG: 75 TABLET, FILM COATED ORAL at 17:49

## 2017-11-16 RX ADMIN — MONTELUKAST SODIUM 20 MG: 10 TABLET, FILM COATED ORAL at 17:49

## 2017-11-16 RX ADMIN — CETIRIZINE HYDROCHLORIDE 10 MG: 10 TABLET, FILM COATED ORAL at 17:49

## 2017-11-16 ASSESSMENT — PAIN DESCRIPTION - DESCRIPTORS: DESCRIPTORS: ACHING

## 2017-11-16 ASSESSMENT — PAIN SCALES - GENERAL: PAINLEVEL: EXTREME PAIN (8)

## 2017-11-16 NOTE — IP AVS SNAPSHOT
MRN:[id number]                      After Visit Summary   11/16/2017    Tisha Arias    MRN: [id number]           Thank you!     Thank you for choosing [facility] for your care. Our goal is always to provide you with excellent care. Hearing back from our patients is one way we can continue to improve our services. Please take a few minutes to complete the written survey that you may receive in the mail after you visit with us. Thank you!        Patient Information     Date Of Birth          1960        Designated Caregiver       Most Recent Value    Caregiver    Will someone help with your care after discharge? yes    Name of designated caregiver Yomi    Phone number of caregiver [phone]    Caregiver address same address      About your hospital stay     You were admitted on:  November 16, 2017 You last received care in the:  [address] [facility]    You were discharged on:  November 21, 2017        Reason for your hospital stay       Chemotherapy with cycle #3 of R-EPOCH for lymphoma                  Who to Call     For medical emergencies, please call 911.  For non-urgent questions about your medical care, please call your primary care provider or clinic, [phone]          Attending Provider     Provider Specialty    Selene Price MD Internal Medicine-Hematology & Oncology       Primary Care Provider Office Phone # Fax #    Shahla Crawley [phone]      After Care Instructions     Activity       Your activity upon discharge: activity as tolerated            Diet       Follow this diet upon discharge: Regular            Discharge Instructions       For your dry skin you can try emollients such as aquaphor, eucerin. Aveeno tends to be less greasy.   Take dexamethasone 8 mg (2 tabs daily) on Wed 11/22 and Th 11/23. Can restart levaquin daily, fluconazole daily, acyclovir twice per day. For nausea can take ondansetron every 8 hours as needed  and/or compazine (prochlorperazine) every 6 hours as needed    [facility] ([phone]) for temperature > 100.4, shortness of breath, chest pain, headaches, vision changes, bleeding, uncontrolled nausea, vomiting, diarrhea, or pain.                  Follow-up Appointments     Follow Up and recommended labs and tests       Follow up as scheduled in clinic (Neulasta injection on 11/22, labs and providers next week)                  Your next 10 appointments already scheduled     Nov 22, 2017  4:00 PM CST   (Arrive by 3:45 PM)   INJECTION with  Oncology Injection Nurse   [facility] ([facility])    [address] [place] [address]   [phone]            Nov 24, 2017  6:30 AM CST   [facility] Draw with  [facility] DRAW   [facility] Draw ([facility])    [address] [place] [address]   [phone]            Nov 27, 2017  4:00 PM CST   (Arrive by 3:45 PM)   Return Visit with Shahla Crawlye MD   [facility] ([facility])    [address] [place] [address]   [phone]            Nov 28, 2017 11:30 AM CST   [facility] with  [facility]   [facility]    [address] [place] [address]   [phone]            Nov 28, 2017 12:00 PM CST   Infusion 360 with UC ONCOLOGY INFUSION, UC 17 ATC   [facility] ([facility])    [address] [place] [address]   [phone]            Nov 28, 2017  5:00 PM CST   RETURN ONC with Garima Sauceda MD   [facility] ([facility])    [address] [place] [address]   [phone]            Dec 01, 2017  6:30  "AM CST   [facility] Draw with  [facility] DRAW   [facility] ([facility])    [address] [address]   [phone]            Dec 05, 2017  6:30 AM CST   [facility] Draw with  [facility] DRAW   [facility] ([facility])    [address] [address]   [phone]            Dec 07, 2017  7:00 AM CST   (Arrive by 6:45 AM)   Return Visit with Jessica Cox PA-C   [facility] ([facility])    [address] [address]   [phone]              Future tests that were ordered for you     CBC with platelets differential       Last Lab Result: Hemoglobin (g/dL)       Date                     Value                 11/20/2017               9.1 (L)          ----------            Comprehensive metabolic panel                 Pending Results     Date and Time Order Name Status Description    11/16/2017 1539 CSF Culture Aerobic Bacterial Preliminary             Statement of Approval     Ordered          11/20/17 1315  I have reviewed and agree with all the recommendations and orders detailed in this document.  EFFECTIVE NOW     Approved and electronically signed by:  Kady Kaufman PA-C             Admission Information     Date & Time Provider Department Dept. Phone    11/16/2017 Selene Price MD [address] [facility] [phone]      Your Vitals Were     Blood Pressure Pulse Temperature Respirations Height Weight    113/75 89 98  F (36.7  C) (Axillary) 18 1.605 m (5' 3.19\") 85.2 kg (187 lb 12.8 oz)    Pulse Oximetry BMI (Body Mass Index)                96% 33.07 kg/m2          [facility] Information     [facility] gives you secure access to your electronic health record. If you see a primary care provider, you can also send messages to your care team and make appointments. If " you have questions, please call your primary care clinic.  If you do not have a primary care provider, please call [phone] and they will assist you.        Care EveryWhere ID     This is your Care EveryWhere ID. This could be used by other organizations to access your [facility] medical records  [id number]        Equal Access to Services     RODRIGO ZAMORA : Kevin huff Soesteban, waaxda luqadaha, qaybta kaalmajosseline lama, ranjan martin. So [facility] [place] [facility] [phone].    ATENCIÓN: Si habla español, tiene a stiles disposición servicios gratuitos de asistencia lingüística. [facility] [phone].    We comply with applicable federal civil rights laws and [place] laws. We do not discriminate on the basis of race, color, national origin, age, disability, sex, sexual orientation, or gender identity.               Review of your medicines      START taking        Dose / Directions    lidocaine 5 % ointment   Commonly known as:  XYLOCAINE   Used for:  Chest wall pain        Apply topically 3 times daily as needed for moderate pain   Quantity:  150 g   Refills:  1         CONTINUE these medicines which may have CHANGED, or have new prescriptions. If we are uncertain of the size of tablets/capsules you have at home, strength may be listed as something that might have changed.        Dose / Directions    calcitRIOL 0.25 MCG capsule   Commonly known as:  ROCALTROL   This may have changed:  additional instructions   Used for:  Postsurgical hypothyroidism, Papillary carcinoma, follicular variant (H), Metastasis to cervical lymph node (H)        TAKE 2 CAPSULES BY MOUTH EVERY MORNING AND TAKE 1 CAPSULE BY MOUTH EVERY NIGHT AT BEDTIME   Quantity:  270 capsule   Refills:  3       dexamethasone 4 MG tablet   Commonly known as:  DECADRON   This may have changed:    - how much to take  - how to take this  - when to take this  - additional instructions   Used for:  High grade B-cell lymphoma (H)        Dose:   8 mg   Start taking on:  11/22/2017   Take 2 tablets (8 mg) by mouth daily for 2 days On Wed 11/22 and Thursday 11/23   Quantity:  4 tablet   Refills:  0       diazepam 5 MG tablet   Commonly known as:  VALIUM   This may have changed:    - when to take this  - additional instructions   Used for:  Chest wall pain        Dose:  5 mg   Take 1 tablet (5 mg) by mouth 3 times daily as needed For muscle spasms   Quantity:  90 tablet   Refills:  2       furosemide 20 MG tablet   Commonly known as:  LASIX   This may have changed:    - how much to take  - when to take this   Used for:  Localized edema        Dose:  20 mg   Take 1 tablet (20 mg) by mouth 2 times daily   Quantity:  60 tablet   Refills:  0       levothyroxine 112 MCG tablet   Commonly known as:  SYNTHROID/LEVOTHROID   This may have changed:    - how much to take  - additional instructions   Used for:  Postsurgical hypothyroidism, Papillary carcinoma, follicular variant (H), Postsurgical hypoparathyroidism (H), Thyroid cancer (H)        Mon-Sat: (2 tabs daily) Sunday: 3 tabs   Quantity:  189 tablet   Refills:  3       methocarbamol 750 MG tablet   Commonly known as:  ROBAXIN   This may have changed:    - when to take this  - additional instructions   Used for:  Myofascial pain        Dose:  750 mg   Take 1 tablet (750 mg) by mouth 4 times daily as needed . Ok to take a 5th Robaxin on very severe days.   Quantity:  360 tablet   Refills:  1       montelukast 10 MG tablet   Commonly known as:  SINGULAIR   This may have changed:  See the new instructions.   Used for:  Idiopathic mast cell activation syndrome (H)        Dose:  10 mg   Take 1 tablet (10 mg) by mouth 2 times daily   Quantity:  60 tablet   Refills:  0       ondansetron 8 MG ODT tab   Commonly known as:  ZOFRAN-ODT   This may have changed:  reasons to take this   Used for:  High grade B-cell lymphoma (H), Nausea and vomiting, intractability of vomiting not specified, unspecified vomiting type         Dose:  8 mg   Take 1 tablet (8 mg) by mouth every 8 hours as needed   Quantity:  30 tablet   Refills:  1         CONTINUE these medicines which have NOT CHANGED        Dose / Directions    acyclovir 400 MG tablet   Commonly known as:  ZOVIRAX   Indication:  Prophylaxis of Herpes Simplex   Used for:  High grade B-cell lymphoma (H)        Dose:  400 mg   Take 1 tablet (400 mg) by mouth 2 times daily   Quantity:  60 tablet   Refills:  0       aluminum chloride 20 % external solution   Commonly known as:  DRYSOL   Used for:  Rash, Intertrigo        Apply topically At Bedtime   Quantity:  60 mL   Refills:  3       AZMACORT IN        Dose:  2 puff   Inhale 2 puffs into the lungs as needed   Refills:  0       bisacodyl 5 MG EC tablet   Used for:  Constipation, unspecified constipation type        Dose:  15 mg   Take 3 tablets (15 mg) by mouth daily as needed for constipation   Quantity:  30 tablet   Refills:  0       CALCIUM CITRATE +D 315-250 MG-UNIT Tabs per tablet   Generic drug:  calcium citrate-vitamin D        Dose:  2 tablet   Take 2 tablets by mouth 3 times daily   Quantity:  120 tablet   Refills:  0       celecoxib 200 MG capsule   Commonly known as:  celeBREX   Used for:  Chest wall pain        TAKE ONE CAPSULE BY MOUTH TWICE DAILY   Quantity:  56 capsule   Refills:  0       cetirizine 10 MG tablet   Commonly known as:  ZYRTEC ALLERGY   Used for:  High grade B-cell lymphoma (H)        Dose:  10 mg   Take 1 tablet (10 mg) by mouth 3 times daily On hold for lab test.   Quantity:  30 tablet   Refills:  0       COMPOUND CONTAINING CONTROLLED SUBSTANCE - PHARMACY TO MIX COMPOUNDED MEDICATION   Commonly known as:  CMPD RX   Used for:  Neoplasm related pain        Apply small amount to affected area two times daily.   Quantity:  120 g   Refills:  6       cromolyn 4 % ophthalmic solution   Commonly known as:  OPTICROM   Used for:  Idiopathic mast cell activation syndrome (H)        Dose:  1 drop   Place 1 drop into both  eyes 4 times daily   Quantity:  10 mL   Refills:  5       cyclobenzaprine 10 MG tablet   Commonly known as:  FLEXERIL   Used for:  High grade B-cell lymphoma (H)        Dose:  10 mg   Take 1 tablet (10 mg) by mouth 3 times daily as needed   Quantity:  90 tablet   Refills:  0       diclofenac 1 % Gel topical gel   Commonly known as:  VOLTAREN   Used for:  Myofascial pain        Apply affected area two times daily PRN using enclosed dosing card.   Quantity:  100 g   Refills:  0       diphenhydrAMINE 50 MG capsule   Commonly known as:  BENADRYL   Used for:  High grade B-cell lymphoma (H)        Dose:  50 mg   Take 1 capsule (50 mg) by mouth nightly as needed for itching or sleep   Quantity:  56 capsule   Refills:  0       docusate sodium 100 MG tablet   Commonly known as:  COLACE   Used for:  Acute constipation        Dose:  100 mg   Take 100 mg by mouth daily   Quantity:  30 tablet   Refills:  0       EPINEPHrine 0.3 MG/0.3ML injection 2-pack   Commonly known as:  EPIPEN 2-CARIDAD        Dose:  0.3 mg   Inject 0.3 mLs (0.3 mg) into the muscle once as needed for anaphylaxis   Quantity:  0.6 mL   Refills:  3       ferrous sulfate 325 (65 FE) MG tablet   Commonly known as:  IRON   Indication:  Anemia From Inadequate Iron in the Body   Used for:  Status post bariatric surgery        Dose:  325 mg   Take 1 tablet (325 mg) by mouth 3 times daily (with meals)   Quantity:  90 tablet   Refills:  0       fluconazole 200 MG tablet   Commonly known as:  DIFLUCAN   Indication:  Fungal Infection Prophylaxis   Used for:  High grade B-cell lymphoma (H)        Dose:  200 mg   Take 1 tablet (200 mg) by mouth daily   Quantity:  30 tablet   Refills:  0       hydrochlorothiazide 12.5 MG capsule   Commonly known as:  MICROZIDE   Used for:  Hypocalcemia        Dose:  12.5 mg   Take 1 capsule (12.5 mg) by mouth daily   Quantity:  90 capsule   Refills:  2       levofloxacin 250 MG tablet   Commonly known as:  LEVAQUIN   Indication:  bacterial ppx    Used for:  High grade B-cell lymphoma (H)        Dose:  250 mg   Take 1 tablet (250 mg) by mouth daily   Quantity:  30 tablet   Refills:  0       lidocaine visc 2% 2.5mL/5mL & maalox/mylanta w/ simeth 2.5mL/5mL & diphenhydrAMINE 5mg/5mL Susp suspension   Commonly known as:  [facility] Mouthwash [facility]   Used for:  Stomatitis and mucositis, High grade B-cell lymphoma (H)        Dose:  10 mL   Swish and spit 10 mLs in mouth every 6 hours as needed for mouth sores   Quantity:  360 mL   Refills:  1       lidocaine-prilocaine cream   Commonly known as:  EMLA   Used for:  Neoplasm related pain, Chest wall pain        Apply topically as needed (port access pain.)   Quantity:  30 g   Refills:  1       mometasone 110 MCG/INH inhaler   Commonly known as:  ASMANEX 30 METERED DOSES   Used for:  Intermittent asthma, uncomplicated        Dose:  1 puff   Inhale 1 puff into the lungs daily   Quantity:  3 Inhaler   Refills:  3       * MS CONTIN PO        Dose:  60 mg   Take 60 mg by mouth daily Takes at 8pm   Refills:  0       * morphine 30 MG 12 hr tablet   Commonly known as:  MS CONTIN   Used for:  Neoplasm related pain        Dose:  30 mg   Take 1 tablet (30 mg) by mouth every 8 hours . Take an addition pill at night such that your evening dose is 60mg   Quantity:  120 tablet   Refills:  0       NASALCROM 5.2 MG/ACT Aers Inhaler   Used for:  Mast cell disease, systemic   Generic drug:  cromolyn sodium        SPRAY ONE SPRAY( 1 ML) IN NOSTRIL DAILY   Quantity:  1 Bottle   Refills:  6       * order for DME   Used for:  Venous stasis        Equipment being ordered: compression stockings. 20-30 mm or 30 - 40 mm as patient can tolerate. Physical therapy to determine if they should be above or below the knee.   Quantity:  2 Units   Refills:  4       * order for DME   Used for:  Malignant neoplasm of right female breast, unspecified site of breast        Equipment being ordered: compression bra   Quantity:  2 Device   Refills:  1       *  order for DME   Used for:  Peripheral edema        Compression stockings, medium grade, please measure and fit. Please dispense a pair.   Quantity:  1 Units   Refills:  0       oxyCODONE IR 30 MG tablet   Commonly known as:  ROXICODONE   Used for:  High grade B-cell lymphoma (H)        Dose:  30 mg   Take 1 tablet (30 mg) by mouth every 4 hours as needed for moderate to severe pain   Quantity:  180 tablet   Refills:  0       polyethylene glycol powder   Commonly known as:  MIRALAX   Used for:  Acute constipation        Dose:  1 capful   Take 17 g (1 capful) by mouth daily   Quantity:  510 g   Refills:  0       potassium chloride SA 20 MEQ CR tablet   Commonly known as:  KLOR-CON   Used for:  Hypokalemia        Dose:  20 mEq   Take 1 tablet (20 mEq) by mouth daily   Quantity:  90 tablet   Refills:  3       PROBIOTIC DAILY PO   Used for:  Breast cancer, unspecified laterality, Thyroid cancer (H), Chronic arthralgias of knees and hips, unspecified laterality        Dose:  1 capsule   Take 1 capsule by mouth daily Lacto acid bifidobacterium   Refills:  0       prochlorperazine 10 MG tablet   Commonly known as:  COMPAZINE   Used for:  High grade B-cell lymphoma (H), Nausea        Dose:  10 mg   Take 1 tablet (10 mg) by mouth every 6 hours as needed for nausea or vomiting   Quantity:  60 tablet   Refills:  3       ranitidine 75 MG tablet   Commonly known as:  ZANTAC   Used for:  Mast cell disease, systemic        Dose:  75 mg   Take 1 tablet (75 mg) by mouth 3 times daily   Quantity:  270 tablet   Refills:  3       senna-docusate 8.6-50 MG per tablet   Commonly known as:  SENOKOT-S;PERICOLACE   Used for:  Acute constipation        Dose:  1-2 tablet   Take 1-2 tablets by mouth 2 times daily as needed for constipation   Quantity:  60 tablet   Refills:  0       SUMAtriptan 50 MG tablet   Commonly known as:  IMITREX   Used for:  Migraine without status migrainosus, not intractable, unspecified migraine type        Dose:  50  mg   Take 1 tablet (50 mg) by mouth at onset of headache for migraine (may repeat in 2 hours as needed, max 2 tablets daily)   Quantity:  9 tablet   Refills:  3       TUMS 500 MG chewable tablet   Generic drug:  calcium carbonate        Dose:  1.5 chew tab   Take 2 tablets (1,000 mg) by mouth nightly as needed for other (cramps)   Quantity:  180 chew tab   Refills:  3       * UNABLE TO FIND        Dose:  3 tablet   3 tablets 3 times daily MEDICATION NAME: calcium D-Glucarate  3 caps contain 180mg of elemental calcium.   Refills:  0       * UNABLE TO FIND   Indication:  On hold for drug testing   Used for:  Breast cancer, unspecified laterality, Papillary carcinoma, follicular variant (H), Chronic musculoskeletal pain        Dose:  2 tablet   2 tablets 3 times daily MEDICATION NAME: Natural D-Hist (on hold during chemo)   Refills:  0       * UNABLE TO FIND        MEDICATION NAME: Tumeric 3 capsules daily (on hold during chemo)   Refills:  0       * UNABLE TO FIND        Dose:  2 capsule   Take 2 capsules by mouth 3 times daily Muscle Mag. 2 caps contain B1 20mg, B2 20mg, B6 10mg, magesium 20mg, manganese 2mg.   Refills:  0       * UNABLE TO FIND   Used for:  Thyroid cancer (H), Postsurgical hypothyroidism, Postsurgical hypoparathyroidism (H)        Dose:  2 tablet   2 tablets 3 times daily MEDICATION NAME: Digestzymes   Refills:  0       * UNABLE TO FIND   Indication:  P5P50   Used for:  Thyroid cancer (H), Postsurgical hypothyroidism, Postsurgical hypoparathyroidism (H)        Dose:  1 tablet   1 tablet daily MEDICATION NAME: Pure Encapsulations   Refills:  0       VENTOLIN  (90 BASE) MCG/ACT Inhaler   Used for:  Mild intermittent asthma without complication   Generic drug:  albuterol        INHALE 2 PUFFS INTO THE LUNGS EVERY 4 HOURS AS NEEDED FOR SHORTNESS OF BREATH OR DIFFICULT BREATHING OR WHEEZING   Quantity:  18 g   Refills:  3       vitamin D3 2000 UNITS Caps   Used for:  Thyroid cancer (H),  Postsurgical hypothyroidism, Papillary carcinoma, follicular variant (H), Postsurgical hypoparathyroidism (H)        Dose:  5000 Units   Take 5,000 Units by mouth daily Takes 2 tabs daily   Quantity:  60 capsule   Refills:  4       * Notice:  This list has 11 medication(s) that are the same as other medications prescribed for you. Read the directions carefully, and ask your doctor or other care provider to review them with you.      STOP taking     allopurinol 300 MG tablet   Commonly known as:  ZYLOPRIM           ENOVARX-LIDOCAINE HCL 10 % Crea           tacrolimus 0.1 % ointment   Commonly known as:  PROTOPIC           triamcinolone 0.025 % ointment   Commonly known as:  KENALOG                Where to get your medicines      These medications were sent to [id number] - [place] - [address] [place]  [address] [facility] [address] [place] [address]     Phone:  [phone]     lidocaine 5 % ointment         These medications were sent to [place], [place] - [address] [facility]  [address] [facility] [address]     Phone:  [phone]     diclofenac 1 % Gel topical gel         These medications were sent to [id number]  [place]  [address] [facility] [address], [place] [address]    Hours:  Test fax successful 12/11/02   Phone:  [phone]     acyclovir 400 MG tablet    dexamethasone 4 MG tablet    fluconazole 200 MG tablet    furosemide 20 MG tablet    levofloxacin 250 MG tablet    montelukast 10 MG tablet    ondansetron 8 MG ODT tab         Some of these will need a paper prescription and others can be bought over the counter. Ask your nurse if you have questions.     Bring a paper prescription for each of these medications     lidocaine visc 2% 2.5mL/5mL & maalox/mylanta w/ simeth 2.5mL/5mL & diphenhydrAMINE 5mg/5mL Susp suspension               ANTIBIOTIC INSTRUCTION     You've Been Prescribed an  Antibiotic - Now What?  Your healthcare team thinks that you or your loved one might have an infection. Some infections can be treated with antibiotics, which are powerful, life-saving drugs. Like all medications, antibiotics have side effects and should only be used when necessary. There are some important things you should know about your antibiotic treatment.      Your healthcare team may run tests before you start taking an antibiotic.    Your team may take samples (e.g., from your blood, urine or other areas) to run tests to look for bacteria. These test can be important to determine if you need an antibiotic at all and, if you do, which antibiotic will work best.      Within a few days, your healthcare team might change or even stop your antibiotic.    Your team may start you on an antibiotic while they are working to find out what is making you sick.    Your team might change your antibiotic because test results show that a different antibiotic would be better to treat your infection.    In some cases, once your team has more information, they learn that you do not need an antibiotic at all. They may find out that you don't have an infection, or that the antibiotic you're taking won't work against your infection. For example, an infection caused by a virus can't be treated with antibiotics. Staying on an antibiotic when you don't need it is more likely to be harmful than helpful.      You may experience side effects from your antibiotic.    Like all medications, antibiotics have side effects. Some of these can be serious.    Let you healthcare team know if you have any known allergies when you are admitted to the hospital.    One significant side effect of nearly all antibiotics is the risk of severe and sometimes deadly diarrhea caused by Clostridium difficile (C. Difficile). This occurs when a person takes antibiotics because some good germs are destroyed. Antibiotic use allows C. diificile to take over,  putting patients at high risk for this serious infection.    As a patient or caregiver, it is important to understand your or your loved one's antibiotic treatment. It is especially important for caregivers to speak up when patients can't speak for themselves. Here are some important questions to ask your healthcare team.    What infection is this antibiotic treating and how do you know I have that infection?    What side effects might occur from this antibiotic?    How long will I need to take this antibiotic?    Is it safe to take this antibiotic with other medications or supplements (e.g., vitamins) that I am taking?     Are there any special directions I need to know about taking this antibiotic? For example, should I take it with food?    How will I be monitored to know whether my infection is responding to the antibiotic?    What tests may help to make sure the right antibiotic is prescribed for me?      Information provided by:  www.cdc.gov/getsmart  U.S. Department of Health and Human Services  Centers for disease Control and Prevention  National Center for Emerging and Zoonotic Infectious Diseases  Division of Healthcare Quality Promotion         Protect others around you: Learn how to safely use, store and throw away your medicines at www.disposemymeds.org.             Medication List: This is a list of all your medications and when to take them. Check marks below indicate your daily home schedule. Keep this list as a reference.      Medications           Morning Afternoon Evening Bedtime As Needed    acyclovir 400 MG tablet   Commonly known as:  ZOVIRAX   Take 1 tablet (400 mg) by mouth 2 times daily   Last time this was given:  400 mg on 11/20/2017  5:57 PM                                aluminum chloride 20 % external solution   Commonly known as:  DRYSOL   Apply topically At Bedtime                                AZMACORT IN   Inhale 2 puffs into the lungs as needed                                 bisacodyl 5 MG EC tablet   Take 3 tablets (15 mg) by mouth daily as needed for constipation   Last time this was given:  5 mg on 11/20/2017  4:49 AM                                calcitRIOL 0.25 MCG capsule   Commonly known as:  ROCALTROL   TAKE 2 CAPSULES BY MOUTH EVERY MORNING AND TAKE 1 CAPSULE BY MOUTH EVERY NIGHT AT BEDTIME   Last time this was given:  0.5 mcg on 11/20/2017 11:19 AM                                CALCIUM CITRATE +D 315-250 MG-UNIT Tabs per tablet   Take 2 tablets by mouth 3 times daily   Last time this was given:  2 tablets on 11/20/2017  5:56 PM   Generic drug:  calcium citrate-vitamin D                                celecoxib 200 MG capsule   Commonly known as:  celeBREX   TAKE ONE CAPSULE BY MOUTH TWICE DAILY   Last time this was given:  200 mg on 11/20/2017  5:57 PM                                cetirizine 10 MG tablet   Commonly known as:  ZYRTEC ALLERGY   Take 1 tablet (10 mg) by mouth 3 times daily On hold for lab test.   Last time this was given:  10 mg on 11/20/2017  5:56 PM                                COMPOUND CONTAINING CONTROLLED SUBSTANCE - PHARMACY TO MIX COMPOUNDED MEDICATION   Commonly known as:  CMPD RX   Apply small amount to affected area two times daily.                                cromolyn 4 % ophthalmic solution   Commonly known as:  OPTICROM   Place 1 drop into both eyes 4 times daily   Last time this was given:  1 drop on 11/20/2017  5:58 PM                                cyclobenzaprine 10 MG tablet   Commonly known as:  FLEXERIL   Take 1 tablet (10 mg) by mouth 3 times daily as needed                                dexamethasone 4 MG tablet   Commonly known as:  DECADRON   Take 2 tablets (8 mg) by mouth daily for 2 days On Wed 11/22 and Thursday 11/23   Start taking on:  11/22/2017                                diazepam 5 MG tablet   Commonly known as:  VALIUM   Take 1 tablet (5 mg) by mouth 3 times daily as needed For muscle spasms   Last time this was  given:  5 mg on 11/20/2017  5:56 PM                                diclofenac 1 % Gel topical gel   Commonly known as:  VOLTAREN   Apply affected area two times daily PRN using enclosed dosing card.   Last time this was given:  2 g on 11/20/2017  5:59 PM                                diphenhydrAMINE 50 MG capsule   Commonly known as:  BENADRYL   Take 1 capsule (50 mg) by mouth nightly as needed for itching or sleep   Last time this was given:  50 mg on 11/16/2017  5:16 PM                                docusate sodium 100 MG tablet   Commonly known as:  COLACE   Take 100 mg by mouth daily                                EPINEPHrine 0.3 MG/0.3ML injection 2-pack   Commonly known as:  EPIPEN 2-CARIDAD   Inject 0.3 mLs (0.3 mg) into the muscle once as needed for anaphylaxis                                ferrous sulfate 325 (65 FE) MG tablet   Commonly known as:  IRON   Take 1 tablet (325 mg) by mouth 3 times daily (with meals)   Last time this was given:  325 mg on 11/20/2017  5:56 PM                                fluconazole 200 MG tablet   Commonly known as:  DIFLUCAN   Take 1 tablet (200 mg) by mouth daily   Last time this was given:  200 mg on 11/20/2017  4:51 AM                                furosemide 20 MG tablet   Commonly known as:  LASIX   Take 1 tablet (20 mg) by mouth 2 times daily   Last time this was given:  20 mg on 11/20/2017 11:20 AM                                hydrochlorothiazide 12.5 MG capsule   Commonly known as:  MICROZIDE   Take 1 capsule (12.5 mg) by mouth daily   Last time this was given:  12.5 mg on 11/20/2017 11:19 AM                                levofloxacin 250 MG tablet   Commonly known as:  LEVAQUIN   Take 1 tablet (250 mg) by mouth daily                                levothyroxine 112 MCG tablet   Commonly known as:  SYNTHROID/LEVOTHROID   Mon-Sat: (2 tabs daily) Sunday: 3 tabs   Last time this was given:  224 mcg on 11/20/2017  4:49 AM                                lidocaine 5 %  ointment   Commonly known as:  XYLOCAINE   Apply topically 3 times daily as needed for moderate pain                                lidocaine visc 2% 2.5mL/5mL & maalox/mylanta w/ simeth 2.5mL/5mL & diphenhydrAMINE 5mg/5mL Susp suspension   Commonly known as:  MAGIC Mouthwash [facility]   Swish and spit 10 mLs in mouth every 6 hours as needed for mouth sores                                lidocaine-prilocaine cream   Commonly known as:  EMLA   Apply topically as needed (port access pain.)                                methocarbamol 750 MG tablet   Commonly known as:  ROBAXIN   Take 1 tablet (750 mg) by mouth 4 times daily as needed . Ok to take a 5th Robaxin on very severe days.   Last time this was given:  750 mg on 11/20/2017  5:56 PM                                mometasone 110 MCG/INH inhaler   Commonly known as:  ASMANEX 30 METERED DOSES   Inhale 1 puff into the lungs daily                                montelukast 10 MG tablet   Commonly known as:  SINGULAIR   Take 1 tablet (10 mg) by mouth 2 times daily   Last time this was given:  20 mg on 11/20/2017  5:57 PM                                * MS CONTIN PO   Take 60 mg by mouth daily Takes at 8pm   Last time this was given:  60 mg on 11/20/2017  8:48 PM                                * morphine 30 MG 12 hr tablet   Commonly known as:  MS CONTIN   Take 1 tablet (30 mg) by mouth every 8 hours . Take an addition pill at night such that your evening dose is 60mg   Last time this was given:  60 mg on 11/20/2017  8:48 PM                                NASALCROM 5.2 MG/ACT Aers Inhaler   SPRAY ONE SPRAY( 1 ML) IN NOSTRIL DAILY   Generic drug:  cromolyn sodium                                ondansetron 8 MG ODT tab   Commonly known as:  ZOFRAN-ODT   Take 1 tablet (8 mg) by mouth every 8 hours as needed   Last time this was given:  8 mg on 11/18/2017  8:12 PM                                * order for DME   Equipment being ordered: compression stockings. 20-30 mm or  30 - 40 mm as patient can tolerate. Physical therapy to determine if they should be above or below the knee.                                * order for DME   Equipment being ordered: compression bra                                * order for DME   Compression stockings, medium grade, please measure and fit. Please dispense a pair.                                oxyCODONE IR 30 MG tablet   Commonly known as:  ROXICODONE   Take 1 tablet (30 mg) by mouth every 4 hours as needed for moderate to severe pain   Last time this was given:  30 mg on 11/20/2017 11:21 PM                                polyethylene glycol powder   Commonly known as:  MIRALAX   Take 17 g (1 capful) by mouth daily                                potassium chloride SA 20 MEQ CR tablet   Commonly known as:  KLOR-CON   Take 1 tablet (20 mEq) by mouth daily   Last time this was given:  20 mEq on 11/20/2017  4:51 AM                                PROBIOTIC DAILY PO   Take 1 capsule by mouth daily Lacto acid bifidobacterium   Last time this was given:  1 capsule on 11/20/2017  6:01 PM                                prochlorperazine 10 MG tablet   Commonly known as:  COMPAZINE   Take 1 tablet (10 mg) by mouth every 6 hours as needed for nausea or vomiting   Last time this was given:  5 mg on 11/16/2017  8:04 PM                                ranitidine 75 MG tablet   Commonly known as:  ZANTAC   Take 1 tablet (75 mg) by mouth 3 times daily   Last time this was given:  75 mg on 11/20/2017  5:57 PM                                senna-docusate 8.6-50 MG per tablet   Commonly known as:  SENOKOT-S;PERICOLACE   Take 1-2 tablets by mouth 2 times daily as needed for constipation   Last time this was given:  2 tablets on 11/20/2017  5:57 PM                                SUMAtriptan 50 MG tablet   Commonly known as:  IMITREX   Take 1 tablet (50 mg) by mouth at onset of headache for migraine (may repeat in 2 hours as needed, max 2 tablets daily)                                 TUMS 500 MG chewable tablet   Take 2 tablets (1,000 mg) by mouth nightly as needed for other (cramps)   Generic drug:  calcium carbonate                                * UNABLE TO FIND   3 tablets 3 times daily MEDICATION NAME: calcium D-Glucarate  3 caps contain 180mg of elemental calcium.   Last time this was given:  11/20/2017  8:45 AM                                * UNABLE TO FIND   2 tablets 3 times daily MEDICATION NAME: Natural D-Hist (on hold during chemo)   Last time this was given:  11/20/2017  8:45 AM                                * UNABLE TO FIND   MEDICATION NAME: Tumeric 3 capsules daily (on hold during chemo)   Last time this was given:  11/20/2017  8:45 AM                                * UNABLE TO FIND   Take 2 capsules by mouth 3 times daily Muscle Mag. 2 caps contain B1 20mg, B2 20mg, B6 10mg, magesium 20mg, manganese 2mg.   Last time this was given:  11/20/2017  8:45 AM                                * UNABLE TO FIND   2 tablets 3 times daily MEDICATION NAME: Digestzymes   Last time this was given:  11/20/2017  8:45 AM                                * UNABLE TO FIND   1 tablet daily MEDICATION NAME: Pure Encapsulations   Last time this was given:  11/20/2017  8:45 AM                                VENTOLIN  (90 BASE) MCG/ACT Inhaler   INHALE 2 PUFFS INTO THE LUNGS EVERY 4 HOURS AS NEEDED FOR SHORTNESS OF BREATH OR DIFFICULT BREATHING OR WHEEZING   Generic drug:  albuterol                                vitamin D3 2000 UNITS Caps   Take 5,000 Units by mouth daily Takes 2 tabs daily   Last time this was given:  5,000 Units on 11/20/2017  6:12 PM                                * Notice:  This list has 11 medication(s) that are the same as other medications prescribed for you. Read the directions carefully, and ask your doctor or other care provider to review them with you.

## 2017-11-16 NOTE — NURSING NOTE
"Oncology Rooming Note    November 16, 2017 12:23 PM   Tisha Arias is a 57 year old female who presents for:    Chief Complaint   Patient presents with     Port Draw     Labs drawn via port by RN     Oncology Clinic Visit     Return: lymphoma      Initial Vitals: /73 (BP Location: Left arm, Patient Position: Chair, Cuff Size: Adult Regular)  Pulse 121  Temp 98.1  F (36.7  C) (Oral)  Resp 16  Ht 1.651 m (5' 5\")  Wt 85.1 kg (187 lb 11.2 oz)  SpO2 98%  BMI 31.23 kg/m2 Estimated body mass index is 31.23 kg/(m^2) as calculated from the following:    Height as of this encounter: 1.651 m (5' 5\").    Weight as of this encounter: 85.1 kg (187 lb 11.2 oz). Body surface area is 1.98 meters squared.  Extreme Pain (8) Comment: Data Unavailable   No LMP recorded. Patient has had a hysterectomy.  Allergies reviewed: Yes  Medications reviewed: Yes    Medications: Medication refills not needed today.  Pharmacy name entered into [employer]: [id number] - [place], [place] - [address] [address] [place]    Clinical concerns: pain level 8 in back neck and head. I informed pt to remind the Provider/SANJANA about  pain level in case i dont touch bases with them, if the provider was in the exam room while i attend on rooming the next pt. Pt verbalized understandings.  Nicci Grover CMA       6 minutes for nursing intake (face to face time)     Nicci Grover CMA                  "

## 2017-11-16 NOTE — MR AVS SNAPSHOT
After Visit Summary   11/16/2017    Tisha Arias    MRN: [id number]           Patient Information     Date Of Birth          1960        Visit Information        Provider Department      11/16/2017 12:30 PM Jessica Cox PA-C [facility]        Today's Diagnoses     High grade B-cell lymphoma (H)           Follow-ups after your visit        Your next 10 appointments already scheduled     Nov 27, 2017  4:00 PM CST   (Arrive by 3:45 PM)   Return Visit with Shahla Crawley MD   [facility] ([facility])    [address] [place] [address]   [phone]            Nov 28, 2017 11:30 AM CST   [facility] Draw with [facility] DRAW   [facility] ([facility])    [address] [place] [address]   [phone]            Nov 28, 2017 12:00 PM CST   Infusion 360 with UC ONCOLOGY INFUSION, UC 17 ATC   [facility] ([facility])    [address] [place] [address]   [phone]            Nov 28, 2017  5:00 PM CST   RETURN ONC with Garima Sauceda MD   [facility] Blood and Marrow Transplant ([facility])    [address] [place] [address]   [phone]            Dec 01, 2017  6:30 AM CST   [facility] Lab Draw with [facility] DRAW   [facility] Lab Draw ([facility])    [address] [place] [address]   [phone]            Dec 05, 2017  6:30 AM CST   [facility] Draw with [facility] DRAW   [facility] ([facility])    [address] [place] [address]   [phone]            Dec 07, 2017  7:00 AM CST   (Arrive by 6:45 AM)   Return Visit with Jessica Cox PA-C   [facility]  "Cancer Clinic ([facility])    [address]  [address]nd [address]  [facility] [address] [phone]            May 21, 2018  4:20 PM CDT   (Arrive by 4:05 PM)   RETURN ENDOCRINE with Avelina Castro MD   [facility]ology ([facility])    [address]  [address]rd Floor  [facility] [address] [phone]              Who to contact     If you have questions or need follow up information about today's clinic visit or your schedule please contact [facility] directly at [phone].  Normal or non-critical lab and imaging results will be communicated to you by [employer], letter or phone within 4 business days after the clinic has received the results. If you do not hear from us within 7 days, please contact the clinic through [employer] or phone. If you have a critical or abnormal lab result, we will notify you by phone as soon as possible.  Submit refill requests through [employer] or call your pharmacy and they will forward the refill request to us. Please allow 3 business days for your refill to be completed.          Additional Information About Your Visit        [employer] Information     [employer] gives you secure access to your electronic health record. If you see a primary care provider, you can also send messages to your care team and make appointments. If you have questions, please call your primary care clinic.  If you do not have a primary care provider, please call [phone] and they will assist you.        Care EveryWhere ID     This is your Care EveryWhere ID. This could be used by other organizations to access your [place] medical records  [id number]        Your Vitals Were     Pulse Temperature Respirations Height Pulse Oximetry BMI (Body Mass Index)    121 98.1  F (36.7  C) (Oral) 16 1.651 m (5' 5\") 98% 31.23 kg/m2       Blood Pressure from Last 3 Encounters:   11/22/17 109/71   11/21/17 102/68   11/16/17 125/73    " Weight from Last 3 Encounters:   11/22/17 83.8 kg (184 lb 11.2 oz)   11/20/17 85.2 kg (187 lb 12.8 oz)   11/16/17 85.1 kg (187 lb 11.2 oz)              We Performed the Following     CBC with platelets differential     Comprehensive metabolic panel     Lactate Dehydrogenase     Uric acid          Today's Medication Changes          These changes are accurate as of: 11/16/17  2:00 PM.  If you have any questions, ask your nurse or doctor.               These medicines have changed or have updated prescriptions.        Dose/Directions    calcitRIOL 0.25 MCG capsule   Commonly known as:  ROCALTROL   This may have changed:  additional instructions   Used for:  Postsurgical hypothyroidism, Papillary carcinoma, follicular variant (H), Metastasis to cervical lymph node (H)        TAKE 2 CAPSULES BY MOUTH EVERY MORNING AND TAKE 1 CAPSULE BY MOUTH EVERY NIGHT AT BEDTIME   Quantity:  270 capsule   Refills:  3       diazepam 5 MG tablet   Commonly known as:  VALIUM   This may have changed:    - when to take this  - additional instructions   Used for:  Chest wall pain        Dose:  5 mg   Take 1 tablet (5 mg) by mouth 3 times daily as needed For muscle spasms   Quantity:  90 tablet   Refills:  2       levothyroxine 112 MCG tablet   Commonly known as:  SYNTHROID/LEVOTHROID   This may have changed:    - how much to take  - additional instructions   Used for:  Postsurgical hypothyroidism, Papillary carcinoma, follicular variant (H), Postsurgical hypoparathyroidism (H), Thyroid cancer (H)        Mon-Sat: (2 tabs daily) Sunday: 3 tabs   Quantity:  189 tablet   Refills:  3       methocarbamol 750 MG tablet   Commonly known as:  ROBAXIN   This may have changed:    - when to take this  - additional instructions   Used for:  Myofascial pain        Dose:  750 mg   Take 1 tablet (750 mg) by mouth 4 times daily as needed . Ok to take a 5th Robaxin on very severe days.   Quantity:  360 tablet   Refills:  1                Primary Care  Provider Office Phone # Fax #    Shahla Raul Crawley [phone]       [address] [place] [id number]        Equal Access to Services     RODRIGO ZAMORA : Hadii aad ku hadmarylu Venegas, waacstilloda luqadaha, qaybta kaalmada daina, ranjan padron paresh martin. So [facility] [phone].    ATENCIÓN: Si habla español, tiene a stiles disposición servicios gratuitos de asistencia lingüística. Llame al [phone].    We comply with applicable federal civil rights laws and [place] laws. We do not discriminate on the basis of race, color, national origin, age, disability, sex, sexual orientation, or gender identity.            Thank you!     Thank you for choosing [facility]  for your care. Our goal is always to provide you with excellent care. Hearing back from our patients is one way we can continue to improve our services. Please take a few minutes to complete the written survey that you may receive in the mail after your visit with us. Thank you!             Your Updated Medication List - Protect others around you: Learn how to safely use, store and throw away your medicines at www.disposemymeds.org.          This list is accurate as of: 11/16/17  2:00 PM.  Always use your most recent med list.                   Brand Name Dispense Instructions for use Diagnosis    acyclovir 400 MG tablet    ZOVIRAX    60 tablet    Take 1 tablet (400 mg) by mouth 2 times daily    High grade B-cell lymphoma (H)       aluminum chloride 20 % external solution    DRYSOL    60 mL    Apply topically At Bedtime    Rash, Intertrigo       AZMACORT IN      Inhale 2 puffs into the lungs as needed        bisacodyl 5 MG EC tablet     30 tablet    Take 3 tablets (15 mg) by mouth daily as needed for constipation    Constipation, unspecified constipation type       calcitRIOL 0.25 MCG capsule    ROCALTROL    270 capsule    TAKE 2 CAPSULES BY MOUTH EVERY MORNING AND TAKE 1 CAPSULE BY MOUTH EVERY  NIGHT AT BEDTIME    Postsurgical hypothyroidism, Papillary carcinoma, follicular variant (H), Metastasis to cervical lymph node (H)       CALCIUM CITRATE +D 315-250 MG-UNIT Tabs per tablet   Generic drug:  calcium citrate-vitamin D     120 tablet    Take 2 tablets by mouth 3 times daily        celecoxib 200 MG capsule    celeBREX    56 capsule    TAKE ONE CAPSULE BY MOUTH TWICE DAILY    Chest wall pain       cetirizine 10 MG tablet    ZYRTEC ALLERGY    30 tablet    Take 1 tablet (10 mg) by mouth 3 times daily On hold for lab test.    High grade B-cell lymphoma (H)       COMPOUND CONTAINING CONTROLLED SUBSTANCE - PHARMACY TO MIX COMPOUNDED MEDICATION    CMPD RX    120 g    Apply small amount to affected area two times daily.    Neoplasm related pain       cromolyn 4 % ophthalmic solution    OPTICROM    10 mL    Place 1 drop into both eyes 4 times daily    Idiopathic mast cell activation syndrome (H)       cyclobenzaprine 10 MG tablet    FLEXERIL    90 tablet    Take 1 tablet (10 mg) by mouth 3 times daily as needed    High grade B-cell lymphoma (H)       dexamethasone 4 MG tablet    DECADRON    4 tablet    Take 2 tablets (8 mg) by mouth daily for 2 days On Wed 11/22 and Thursday 11/23    High grade B-cell lymphoma (H)       diazepam 5 MG tablet    VALIUM    90 tablet    Take 1 tablet (5 mg) by mouth 3 times daily as needed For muscle spasms    Chest wall pain       diclofenac 1 % Gel topical gel    VOLTAREN    100 g    Apply affected area two times daily PRN using enclosed dosing card.    Myofascial pain       diphenhydrAMINE 50 MG capsule    BENADRYL    56 capsule    Take 1 capsule (50 mg) by mouth nightly as needed for itching or sleep    High grade B-cell lymphoma (H)       docusate sodium 100 MG tablet    COLACE    30 tablet    Take 100 mg by mouth daily    Acute constipation       EPINEPHrine 0.3 MG/0.3ML injection 2-pack    EPIPEN 2-CARIDAD    0.6 mL    Inject 0.3 mLs (0.3 mg) into the muscle once as needed for  anaphylaxis        ferrous sulfate 325 (65 FE) MG tablet    IRON    90 tablet    Take 1 tablet (325 mg) by mouth 3 times daily (with meals)    Status post bariatric surgery       fluconazole 200 MG tablet    DIFLUCAN    30 tablet    Take 1 tablet (200 mg) by mouth daily    High grade B-cell lymphoma (H)       furosemide 20 MG tablet    LASIX    60 tablet    Take 1 tablet (20 mg) by mouth 2 times daily    Localized edema       hydrochlorothiazide 12.5 MG capsule    MICROZIDE    90 capsule    Take 1 capsule (12.5 mg) by mouth daily    Hypocalcemia       levofloxacin 250 MG tablet    LEVAQUIN    30 tablet    Take 1 tablet (250 mg) by mouth daily    High grade B-cell lymphoma (H)       levothyroxine 112 MCG tablet    SYNTHROID/LEVOTHROID    189 tablet    Mon-Sat: (2 tabs daily) Sunday: 3 tabs    Postsurgical hypothyroidism, Papillary carcinoma, follicular variant (H), Postsurgical hypoparathyroidism (H), Thyroid cancer (H)       lidocaine 5 % ointment    XYLOCAINE    350 g    Apply quarter size amount to chest and back up to 3 times daily as needed for pain.    Chest wall pain       lidocaine visc 2% 2.5mL/5mL & maalox/mylanta w/ simeth 2.5mL/5mL & diphenhydrAMINE 5mg/5mL Susp suspension    [facility]    360 mL    Swish and spit 10 mLs in mouth every 6 hours as needed for mouth sores    Stomatitis and mucositis, High grade B-cell lymphoma (H)       lidocaine-prilocaine cream    EMLA    30 g    Apply topically as needed (port access pain.)    Neoplasm related pain, Chest wall pain       methocarbamol 750 MG tablet    ROBAXIN    360 tablet    Take 1 tablet (750 mg) by mouth 4 times daily as needed . Ok to take a 5th Robaxin on very severe days.    Myofascial pain       mometasone 110 MCG/INH inhaler    ASMANEX 30 METERED DOSES    3 Inhaler    Inhale 1 puff into the lungs daily    Intermittent asthma, uncomplicated       montelukast 10 MG tablet    SINGULAIR    60 tablet    Take 1 tablet (10 mg) by mouth 2  times daily    Idiopathic mast cell activation syndrome (H)       * MS CONTIN PO      Take 60 mg by mouth daily Takes at 8pm        * morphine 30 MG 12 hr tablet   Start taking on:  11/30/2017    MS CONTIN    120 tablet    Take 1 tablet (30 mg) by mouth every 8 hours . Take an addition pill at night such that your evening dose is 60mg    Neoplasm related pain       NASALCROM 5.2 MG/ACT Aers Inhaler   Generic drug:  cromolyn sodium     1 Bottle    SPRAY ONE SPRAY( 1 ML) IN NOSTRIL DAILY    Mast cell disease, systemic       ondansetron 8 MG ODT tab    ZOFRAN-ODT    30 tablet    Take 1 tablet (8 mg) by mouth every 8 hours as needed    High grade B-cell lymphoma (H), Nausea and vomiting, intractability of vomiting not specified, unspecified vomiting type       * order for DME     2 Units    Equipment being ordered: compression stockings. 20-30 mm or 30 - 40 mm as patient can tolerate. Physical therapy to determine if they should be above or below the knee.    Venous stasis       * order for DME     2 Device    Equipment being ordered: compression bra    Malignant neoplasm of right female breast, unspecified site of breast       * order for DME     1 Units    Compression stockings, medium grade, please measure and fit. Please dispense a pair.    Peripheral edema       oxyCODONE IR 30 MG tablet    ROXICODONE    180 tablet    Take 1 tablet (30 mg) by mouth every 4 hours as needed for moderate to severe pain    High grade B-cell lymphoma (H)       polyethylene glycol powder    MIRALAX    510 g    Take 17 g (1 capful) by mouth daily    Acute constipation       potassium chloride SA 20 MEQ CR tablet    KLOR-CON    90 tablet    Take 1 tablet (20 mEq) by mouth daily    Hypokalemia       PROBIOTIC DAILY PO      Take 1 capsule by mouth daily Lacto acid bifidobacterium    Breast cancer, unspecified laterality, Thyroid cancer (H), Chronic arthralgias of knees and hips, unspecified laterality       prochlorperazine 10 MG tablet     COMPAZINE    60 tablet    Take 1 tablet (10 mg) by mouth every 6 hours as needed for nausea or vomiting    High grade B-cell lymphoma (H), Nausea       ranitidine 75 MG tablet    ZANTAC    270 tablet    Take 1 tablet (75 mg) by mouth 3 times daily    Mast cell disease, systemic       senna-docusate 8.6-50 MG per tablet    SENOKOT-S;PERICOLACE    60 tablet    Take 1-2 tablets by mouth 2 times daily as needed for constipation    Acute constipation       SUMAtriptan 50 MG tablet    IMITREX    9 tablet    Take 1 tablet (50 mg) by mouth at onset of headache for migraine (may repeat in 2 hours as needed, max 2 tablets daily)    Migraine without status migrainosus, not intractable, unspecified migraine type       TUMS 500 MG chewable tablet   Generic drug:  calcium carbonate     180 chew tab    Take 2 tablets (1,000 mg) by mouth nightly as needed for other (cramps)        * UNABLE TO FIND      3 tablets 3 times daily MEDICATION NAME: calcium D-Glucarate  3 caps contain 180mg of elemental calcium.        * UNABLE TO FIND      2 tablets 3 times daily MEDICATION NAME: Natural D-Hist (on hold during chemo)    Breast cancer, unspecified laterality, Papillary carcinoma, follicular variant (H), Chronic musculoskeletal pain       * UNABLE TO FIND      MEDICATION NAME: Tumeric 3 capsules daily (on hold during chemo)        * UNABLE TO FIND      Take 2 capsules by mouth 3 times daily Muscle Mag. 2 caps contain B1 20mg, B2 20mg, B6 10mg, magesium 20mg, manganese 2mg.        * UNABLE TO FIND      2 tablets 3 times daily MEDICATION NAME: Digestzymes    Thyroid cancer (H), Postsurgical hypothyroidism, Postsurgical hypoparathyroidism (H)       * UNABLE TO FIND      1 tablet daily MEDICATION NAME: Pure Encapsulations    Thyroid cancer (H), Postsurgical hypothyroidism, Postsurgical hypoparathyroidism (H)       VENTOLIN  (90 BASE) MCG/ACT Inhaler   Generic drug:  albuterol     18 g    INHALE 2 PUFFS INTO THE LUNGS EVERY 4 HOURS AS  NEEDED FOR SHORTNESS OF BREATH OR DIFFICULT BREATHING OR WHEEZING    Mild intermittent asthma without complication       vitamin D3 2000 UNITS Caps     60 capsule    Take 5,000 Units by mouth daily Takes 2 tabs daily    Thyroid cancer (H), Postsurgical hypothyroidism, Papillary carcinoma, follicular variant (H), Postsurgical hypoparathyroidism (H)       * Notice:  This list has 11 medication(s) that are the same as other medications prescribed for you. Read the directions carefully, and ask your doctor or other care provider to review them with you.

## 2017-11-16 NOTE — NURSING NOTE
Chief Complaint   Patient presents with     Port Draw     Labs drawn via port by RN     /73 (BP Location: Left arm, Patient Position: Chair, Cuff Size: Adult Regular)  Pulse 121  Temp 98.1  F (36.7  C) (Oral)  Wt 85.1 kg (187 lb 11.2 oz)  SpO2 98%  BMI 31.23 kg/m2    Vitals taken. Port accessed by RN. Labs collected and sent. Line flushed with NS & Heparin. Pt tolerated well.    Crystal Saba RN

## 2017-11-16 NOTE — Clinical Note
11/16/2017       RE: Tisha Arias  [address]  [place] [address]     Dear Colleague,    Thank you for referring your patient, Tisha Arias, to the [facility]. Please see a copy of my visit note below.    No notes on file    Again, thank you for allowing me to participate in the care of your patient.      Sincerely,    Jessica Cox PA-C

## 2017-11-16 NOTE — PROGRESS NOTES
Patient admitted to:  room 5-421  Admitted from: home  Arrived by: ambulated onto unit  Reason for admission: scheduled admit for round 3 of R-EPOCH  Patient accompanied by: daughter  Belongings: in room with patient  Teaching: Admission teaching per unit routine

## 2017-11-16 NOTE — LETTER
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 She is currently undergoing treatment with DA-R-EPOCH. Her past medical history is significant for breast cancer in 2011 s/p bilateral mastectomies and BENJIE/BSO up until recently on Tamoxifen, papillary thyroid cancer s/p total thyroidectomy, chronic chest wall pain, and asthma. Briefly, her oncologic history is as follows:    She presented in 8/2017 with dramatically worse chest and back pain. CT 9/1/17 showed lytic lesion right 10th rib extending to right T9-10 neural foramen with vertebral body invasion. There was associated conglomerate of lymphadenopathy around the mid T-spine. She had a CT guided biopsy showing B-cell high grade lymphoma. Pathology showed \"The morphology shows a population of large cells, diffuse lymphoid infiltrate. The cells are atypical with abundant mitotic and apoptotic activity and single cell necrosis. Tumor is CD45 and CD79a positive and focally weakly CD30 positive. The other stains revealed positivity for CD20, BCL6, BCL2 and MUM1, and CD10 and CD21 negative. The CD5 and CD3 highlighted background T-cells.  MYC expression was equivocal with approximately 40% nuclei staining.  Ki-67 proliferative index is greater than 90-95%. The immunohistochemistry is suggestive of non-GCB immunophenotype of diffuse large B-cell lymphoma. The cytogenetics did not show rearrangement of the BCL2 or c-MYC. There is the presence of BCL6 rearrangement in 78% of cells and gains of MYC and BCL2, but no amplifications.\"     Staging LP and BMBx were negative for lymphoma. She started DA-REPOCH 10/6/17. She did well with chemo other than rigors during CIVI. D/c 10/11/17. Given Neulasta 10/12/17. Post cycle 1 " "complicated by mucositis and worsening of chronic LE peripheral edema. She began cycle 2 on 10/27/17. Due to a rise in her LD and uric acid levels on that admission day, she underwent a CT scan which showed response to therapy with improvement in her mediastinal lymphadenopathy and right chest wall mass. She comes in today for routine follow up prior to admission for cycle 3.     Interval History:  Patient reports that she continues to have intermittent headaches from the occipital area to the front, managed with Tylenol and Imitrex. She has had 2 further episodes of chest pain since her last visit here, each lasting about 2 hours. She continue to have dyspnea on exertion with stairs. She reports less body shaking as she gets further out from her last cycle of chemotherapy. She denies any change to her abdominal pain and is using Colace and Dulcolax to keep her bowels regular. She denies neuropathy, but reports that her toes feel heavy in her socks. She denies other concerns.     Review of Systems:  Patient denies any of the following except if noted above: fevers, chills, difficulty with energy, vision or hearing changes, current chest pain, dyspnea at rest, new abdominal pain, nausea, vomiting, diarrhea, urinary concerns, numbness, tingling, issues with sleep or mood.      Medications reviewed in the EMR.    Physical Examination:  /73 (BP Location: Left arm, Patient Position: Chair, Cuff Size: Adult Regular)  Pulse 121  Temp 98.1  F (36.7  C) (Oral)  Resp 16  Ht 1.651 m (5' 5\")  Wt 85.1 kg (187 lb 11.2 oz)  SpO2 98%  BMI 31.23 kg/m2  Wt Readings from Last 10 Encounters:   11/18/17 85.8 kg (189 lb 1.6 oz)   11/16/17 85.1 kg (187 lb 11.2 oz)   11/13/17 85.3 kg (188 lb)   11/10/17 84.8 kg (187 lb)   11/06/17 83.3 kg (183 lb 9.6 oz)   11/03/17 82.6 kg (182 lb)   10/31/17 85 kg (187 lb 8 oz)   10/27/17 85.1 kg (187 lb 9.8 oz)   10/23/17 85.9 kg (189 lb 4.8 oz)   10/23/17 85.9 kg (189 lb 4.8 oz) " "  Constitutional: Well-appearing female in no acute distress.  Eyes: EOMI, PERRL. No scleral icterus.  ENT: Oral mucosa is moist without lesions or thrush.   Lymphatic: Neck is supple without cervical or supraclavicular lymphadenopathy.   Cardiovascular: Regular rate and rhythm.  Respiratory: Clear to auscultation bilaterally. No wheezes or crackles.  Gastrointestinal: Bowel sounds present. Abdomen soft, nontender.    Neurologic: Cranial nerves II through XII are intact.   Skin: No rashes, petechiae, or bruising noted on exposed skin.   Extremities: 1+ lower extremity edema bilaterally, compression stockings in place.    Laboratory Data:   11/16/2017 06:58   Sodium 141   Potassium 3.8   Chloride 104   Carbon Dioxide 28   Urea Nitrogen 14   Creatinine 0.80   GFR Estimate 74   GFR Estimate If Black 89   Calcium 8.4 (L)   Anion Gap 8   Albumin 2.7 (L)   Protein Total 5.8 (L)   Bilirubin Total 0.2   Alkaline Phosphatase 85   ALT 26   AST 21   Lactate Dehydrogenase 376 (H)   Uric Acid 6.1 (H)   Glucose 143 (H)   WBC 6.1   Hemoglobin 9.9 (L)   Hematocrit 31.7 (L)   Platelet Count 345   RBC Count 3.62 (L)   MCV 88   MCH 27.3   MCHC 31.2 (L)   RDW 21.5 (H)   Diff Method Manual Differential   % Neutrophils 71.3   % Lymphocytes 13.9   % Monocytes 10.4   % Eosinophils 0.0   % Basophils 2.6   % Metamyelocytes 0.9   % Myelocytes 0.9   Nucleated RBCs 2 (H)   Absolute Neutrophil 4.3   Absolute Lymphocytes 0.8   Absolute Monocytes 0.6   Absolute Eosinophils 0.0   Absolute Basophils 0.2   Absolute Metamyelocytes 0.1 (H)   Absolute Myelocytes 0.1 (H)   Absolute Nucleated RBC 0.1   Anisocytosis Moderate   Poikilocytosis Slight   Polychromasia Slight   Teardrop Cells Slight   Ovalocytes Slight   Platelet Estimate Confirming automa...     Assessment and Plan:    1. DLBCL, \"double-hit\"-like, currently on treatment with DA-R-EPOCH  Cycle 1 overall tolerated well other than mucositis and mild infusion reaction (rigors) to continuous " infusion of chemotherapy. Interval CT CAP after 1 cycle due to elevated LD and uric acid showed response to treatment. She tolerated cycle 2 fairly well with some fatigue, nausea, and constipation. She will be admitted for cycle 3 today. She will have twice weekly labs after hospital discharge. She will have a PET/CT after cycle 4.     2. Sensation of body shaking.  Improved. Present since starting chemotherapy. Possibly a side effect. She declines a referral to neurology. If worsens, will need to reconsider referral.     3. Heme  No transfusion needs today. Continue to monitor with twice weekly labs and transfuse if Hgb<8 and Plt<10k after hospital d/c.    4. ID  Continue ppx ACV 400mg BID. Hold levaquin 250mg daily and fluconazole 200mg daily, as not neutropenic.     5. Lower extremity edema with pain  Chronic condition likely exacerbated by steroids and excessive fluids. US on 10/21 negative for DVT bilaterally. Continue Lasix 20 mg daily. Continue pain medications as prescribed by palliative care. Previously, encouraged increased protein in diet and recommend pushing fluids with a goal of 64 oz/day.     6. History of stage 1, ER/IN+, HER2- breast cancer s/p bilateral mastectomies and BENJIE/BSO  Follows with Dr. Crawley. Oncotype recurrence score 11%. Was initially on Arimidex (6027-2465) but changed to Tamoxifen due to arthralgias. Tamoxifen held since 10/5 and continue to hold with chemotherapy. F/u Dr. Crawley 11/27/17    7. History of papillary thyroid cancer, s/p total thyroidectomy  Follows with Dr. Castro. Has post surgical hypothyroidism. Continue levothyroxine and calcitriol as prescribed. Neck u/s on 11/2 shows no evidence of disease.    8. History of Rachael en Y gastric bypass  Continue supplements (calcium citrate-vitamin D, vitamin D3, magnesium citrate). Also has iron deficiency anemia likely from poor absorption and per Dr. Sauceda, she should continue 325mg PO ferrous sulfate TID.     9. Chronic  chest wall pain s/p mastectomies  Follows with Dr. Benitez. Palliative care to continue to prescribe pain medications. Taking MS Contin, Oxycodone for breakthrough pain, and celebrex.     10. Constipation.  Under control with Colace and Dulcolax, which she will continue.      Jessica Cox PA-C  [facility]  [address] [place] [address]  [phone]      Jessica Cox PA-C

## 2017-11-16 NOTE — IP AVS SNAPSHOT
[address] [address]    Phone:  [phone]    Fax:  [phone]                                       After Visit Summary   11/16/2017    Tisha Arias    MRN: [id number]           After Visit Summary Signature Page     I have received my discharge instructions, and my questions have been answered. I have discussed any challenges I see with this plan with the nurse or doctor.    ..........................................................................................................................................  Patient/Patient Representative Signature      ..........................................................................................................................................  Patient Representative Print Name and Relationship to Patient    ..................................................               ................................................  Date                                            Time    ..........................................................................................................................................  Reviewed by Signature/Title    ...................................................              ..............................................  Date                                                            Time

## 2017-11-16 NOTE — H&P
"[facility], [place]    History and Physical  Hematology / Oncology     Date of Admission:  11/16/2017  Date of Service (when I saw the patient): 11/16/17    Assessment & Plan   Tisha Arias is a 57 year old female with high grade DLBCL and PMHx significant for breast cancer s/p bilateral mastectomies & BENJIE/BSO, papillary thyroid cancer s/p total thyroidectomy, chronic chest wall pain, muscle spasms, asthma and h/o Rachael en Y gastric bypass, who presents for Cycle 3 of DA-R-EPOCH.     HEME  # High grade B cell lymphoma. \"Double hit-like\". Primary is Dr. Sauceda. Patient diagnosed August 2017. Lytic lesion of right 10th rib with extension. Disease localized above the diaphragm in thoracic and paravertebral soft tissue and mediastinal lymphadenopathy. 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 Staging LP and BMBx were negative for lymphoma. Initiated cycle 1 DA-R-EPOCH on 10/6/17. S/p cycle 2 10/27/17. She now presents for cycle 3 DA-R-EPOCH.  -Port-a-cath in place  -Labs ok to begin therapy     Treatment plan: Cycle 3 DA-R-EPOCH (Day 1=11/16/17).  Today is Day 1.  -Rituxan 750 mg (Day 0)  -Prednisone 60 mg  (Day 1-5)  -Etoposide 100 mg CIVI (Day 1-4)  -Vincristine 0.8 mg CIVI (Day 1-4)  -Cyclophosphamide 1500 mg (Day 5)  -LP with IT chemotherapy scheduled Friday 11/17 at 1100. Orders entered.  -Premeds: Tylenol and benadryl prior to Rituxan, Zofran (D1-5), Emend (d5), Dex (D6-7).  -Neulasta on discharge.  -Discharge with D6-7 Dexamethasone     #Anemia. 2/2 disease and chemotherapy.  -Transfuse for Hgb <8 and plts <10K.     #Stage 1, ER/AZ-positive, HER2-negative breast cancer. Follows with Dr. Crawley. Diagnosed in 2011. Oncotype recurrence score of 11 (7% recurrence risk after 5 years of tamoxifen). S/p bilateral mastectomies and BENJIE/BSO in 2012. " For endocrine therapy she was on Arimidex from 6085-9006, then changed to Tamoxifen b/c of arthralgias on Arimidex. Has been on Tamoxifen since 2014 now held while being treated for lymphoma. Last dose 10/5.   -F/u with Dr. Crawley 11/27/17.     # Papillary thyroid cancer. Follows with Dr. Castro. Diagnosed in 2013. S/p total thyroidectomy and CND. Received ETOH treatment August 2014, October 2014 and December 2014. Has post-surgical hypothyroidism.  -Continue PTA Levothyroxine.  -Continue PTA calcitriol.     GI  # H/o Rachael en Y gastric bypass. Likely affecting absorption, thus patient is on multiple supplements.  -Continue Calcium and Magnesium replacements.     Pain  # Chronic chest wall pain after mastectomy.   -Continue MS Contin 30 mg morning and afternoon and 60 mg at night.  -Oxycodone 30 mg q4hrs prn.  -Celebrex 200 BID.   -Lidocaine cream prn.     #Chronic muscle cramps.   -Continue KCl 20 mEq daily, Robaxin 750 mg QID, Valium 5 mg TID prn, Flexeril prn.     ID  #Anti-infectives.  -Continue PTA acyclovir and fluconazole.  -Will hold Levaquin ppx as patient is not neutropenic. To resume on discharge or when ANC <1000.     MISC  #Lymphedema. Of LE. Improved per patient. Wearing compression stockings.   -Continue PTA HCTZ and Lasix prn.  -Monitor BMP daily     #Asthma, allergies.   -Continue PTA singular, zyrtec.  -Patient states she is not taking her inhalers. Requests only albuterol prn rescue.     FEN  -IVF per chemo regimen  -PTA electrolyte replacements and lyte protocol  -RDAT     PPx  -VTE: Lovenox ppx  -PUD: PTA ranitidine  -Bowels: PTA docusate and miralax      Dispo: Pending completion of chemotherapy and resolution of any acute toxicities, likely 4-5 day stay (~11/20 or 11/21).     Above plan discussed with staff physician, Dr. Albert Dyer PAAdairC  Hematology/Oncology  Pager: [phone]    Code Status   Full Code    Primary Care Physician   Shahla Crawley    Chief Complaint    Admission for cycle 3 DA REPOCH    History is obtained from the patient    History of Present Illness   Tisha Arias is a 57 year old female with a history of recently diagnosed DLBCL, breast cancer on Tamoxifen s/p  bilateral mastectomies and BENJIE/BSO, h/o papillary thyroid cancer s/p total thyroidectomy, chronic chest wall pain and asthma who presents for cycle 1 DA-REPOCH. She was diagnosed with early-stage breast cancer in 2011 and underwent bilateral mastectomy with reconstruction and has been on Tamoxifen. She did not have chemotherapy or radiation. She has suffered chronically from a pain syndrome involving the chest wall and back and [facility] with [facility]. Benitez. In August 2017, she presented to ER with dramatic worsening chest pain and back pain. Her workup was negative for cardiac and pulmonary issues. However, the CT scan performed on 09/01/2017 demonstrated a new destructive lesion in the medial right tenth rib extending to the right T9-T10 neural foramen with vertebral body invasion. A CT guided biopsy on 09/15 showed B-cell high-grade lymphoma. The morphology shows a population of large cells, diffuse lymphoid infiltrate. The cells are atypical with abundant mitotic and apoptotic activity and single cell necrosis. Tumor is CD45 and CD79a positive and focally weakly CD30 positive. The other stains revealed positivity for CD20, BCL6, BCL2 and MUM1, and CD10 and CD21 negative. The CD5 and CD3 highlighted background T-cells. MYC expression was equivocal with approximately 40% nuclei staining. Ki-67 proliferative index is greater than 90-95%. The immunohistochemistry is suggestive of non-GCB immunophenotype of diffuse large B-cell lymphoma. The cytogenetics did not show rearrangement of the BCL2 or c-MYC. There is the presence of BCL6 rearrangement in 78% of cells and gains of MYC and BCL2, but no amplifications. A staging LP and BMBx were negative for lymphoma. She  initiated therapy with DA-REPOCH on 10/6/17. She now presents for cycle 3 DA-REPOCH.      On admission, pt states she has been doing well. Her allergies have been a bit worse, in particular with rhinorrhea. Is feeling fatigued and surprised her hemoglobin was as high as it was. Also a bit nauseated today. Has tolerated her previous cycles fairly well. Experienced constipation and peripheral edema with previous cycles. Also states she feels like a moving dryer during chemotherapy. Has SOB with exertion. No recent fevers. Denies chest pain, abdominal pain, dysuria.     Past Medical History    I have reviewed this patient's medical history and updated it with pertinent information if needed.   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 No real need for Epipen for this alone.     GDM (gestational diabetes mellitus)     w first pregnancy only     Gestational hypertension      Lymphedema     jackson arms, chest     Migraine      Neuropathy associated with cancer (H)      Papillary carcinoma, follicular variant (H) 6/28/2013    pT3, N1, Mx, thyroid     Post-surgical hypothyroidism      Renal disease     kidney stones     Rhinitis, allergic to other allergen      Right Breast mass and microcalcifications 12/13/2011     Seasonal allergic conjunctivitis      Seasonal allergic rhinitis 4/12/11 skin tests pos. for: dog/M/T/G/W--NEGATIVE FOOD TESTS FOR: shrimp, crab, lobster, coconut       Past Surgical History   I have reviewed this patient's surgical history and updated it with pertinent information if needed.  Past Surgical History:   Procedure Laterality Date     BACK SURGERY  2000,    Bulging disc w nerve root  impigment     BIOPSY BREAST      right     BIOPSY BREAST  11    right, core and sterotactic     BONE MARROW BIOPSY, BONE SPECIMEN, NEEDLE/TROCAR Left 10/3/2017    Procedure: BIOPSY BONE MARROW;  Bone Marrow Biopsy with aspirate;  Surgeon: Amelia Watkins PA-C;  Location: [facility] OR     BYPASS GASTRIC, CHOLECYSTECTOMY, COMBINED       C LAPAROSCOPIC GASTRIC RESTRICTIVE PX, W/GASTRIC BYPASS/ GAMALIEL-EN-Y, < 150CM      Gamaliel en Y?      SECTION       COLONOSCOPY      normal     COSMETIC SURGERY  2012    Final Stage of Mastectomy     DAVINCI HYSTERECTOMY TOTAL, BILATERAL SALPINGO-OOPHORECTOMY, COMBINED  2012    Procedure: COMBINED DAVINCI HYSTERECTOMY TOTAL, SALPINGO-OOPHORECTOMY;  Davinci Total Laparoscopic Hysterectomy, Bilateral Salpingo Oophorectomy, Pelvic Washings, Cystoscopy;  Surgeon: Evie Sheikh MD;  Location: [facility] OR     ENT SURGERY       ESOPHAGOSCOPY, GASTROSCOPY, DUODENOSCOPY (EGD), COMBINED N/A 2017    Procedure: COMBINED ENDOSCOPIC ULTRASOUND, ESOPHAGOSCOPY, GASTROSCOPY, DUODENOSCOPY (EGD), FINE NEEDLE ASPIRATE/BIOPSY;  Esophagogastroduodenoscopy, Endoscopic Ultrasound, with fine needle biopsy aspirate;  Surgeon: Patrick Robles MD;  Location: [facility] OR     GI SURGERY  2007    Gamaliel-en Y w cholecystectomy     GYN SURGERY  89,1/10/92    2 c-sections     HYSTERECTOMY, PAP NO LONGER INDICATED      DeVinci assister lap hyst with BSO     INSERT PORT VASCULAR ACCESS Right 10/4/2017    Procedure: INSERT PORT VASCULAR ACCESS;  Port Placement;  Surgeon: Jc Goodman PA-C;  Location: [facility] OR     IRRIGATION AND DEBRIDEMENT BREAST  3/1/2012    Procedure:IRRIGATION AND DEBRIDEMENT BREAST; Irrigation and Debridement, Wound Closure Right Breast; Surgeon:JUNG GUILLEN; Location:[facility] OR     MASTECTOMY, RECONSTRUCT BREAST, COMBINED  2012    Procedure:COMBINED MASTECTOMY, RECONSTRUCT BREAST; Bilateral Mastectomies, Right Axillary [place]  Node Biopsy, Bilateral Breast Reconstruction with Tissue Expanders, Reconstruction of inframammary fold, bilateral pain management systems; Surgeon:ANUPAM CAMPBELL; Location:[facility] OR     RECONSTRUCT BREAST  8/31/2012    Procedure: RECONSTRUCT BREAST;  Bilateral 2nd stage breast reconstruction, revision,       SOFT TISSUE SURGERY  1-30-12    Mastectomy w severe myofascial pain syndrome     THYROIDECTOMY  7/10/2013    Procedure: THYROIDECTOMY;  Total Thyroidectomy with central neck dissection;  Surgeon: Indiana Sneed MD;  Location: [facility] OR     TONSILLECTOMY      childhood       Prior to Admission Medications   Prior to Admission Medications   Prescriptions Last Dose Informant Patient Reported? Taking?   COMPOUND CONTAINING CONTROLLED SUBSTANCE (CMPD RX) - PHARMACY TO MIX COMPOUNDED MEDICATION Past Week at Unknown time  No Yes   Sig: Apply small amount to affected area two times daily.   Cholecalciferol (VITAMIN D3) 2000 UNITS CAPS 11/16/2017 at Unknown time Self Yes Yes   Sig: Take 5,000 Units by mouth daily Takes 2 tabs daily   ENOVARX-LIDOCAINE HCL 10 % CREA 11/16/2017 at Unknown time  No Yes   Sig: Externally apply 1 g topically 3 times daily as needed   EPINEPHrine (EPIPEN 2-CARIDAD) 0.3 MG/0.3ML injection Unknown at Unknown time Self No No   Sig: Inject 0.3 mLs (0.3 mg) into the muscle once as needed for anaphylaxis   Morphine Sulfate (MS CONTIN PO) 11/16/2017 at Unknown time  Yes Yes   Sig: Take 60 mg by mouth daily Takes at 8pm   NASALCROM 5.2 MG/ACT AERS Inhaler Unknown at Unknown time Self No No   Sig: SPRAY ONE SPRAY( 1 ML) IN NOSTRIL DAILY   Probiotic Product (PROBIOTIC DAILY PO) 11/16/2017 at Unknown time Self Yes Yes   Sig: Take 1 capsule by mouth daily Lacto acid bifidobacterium   SUMAtriptan (IMITREX) 50 MG tablet 11/16/2017 at Unknown time  No Yes   Sig: Take 1 tablet (50 mg) by mouth at onset of headache for migraine (may repeat in 2 hours as needed, max 2 tablets daily)   Triamcinolone Acetonide  (AZMACORT IN) Unknown at Unknown time Self Yes No   Sig: Inhale 2 puffs into the lungs as needed   UNABLE TO FIND 2017 at Unknown time Self Yes Yes   Si tablets 3 times daily MEDICATION NAME: Digestzymes    UNABLE TO FIND 2017 at Unknown time Self Yes Yes   Si tablet daily MEDICATION NAME: Pure Encapsulations   UNABLE TO FIND 2017 at Unknown time Self Yes Yes   Sig: 3 tablets 3 times daily MEDICATION NAME: calcium D-Glucarate   3 caps contain 180mg of elemental calcium.   UNABLE TO FIND 2017 at Unknown time Self Yes Yes   Si tablets 3 times daily MEDICATION NAME: Natural D-Hist (on hold during chemo)   UNABLE TO FIND Unknown at Unknown time Self Yes No   Sig: MEDICATION NAME: Tumeric 3 capsules daily (on hold during chemo)   UNABLE TO FIND 2017 at Unknown time  Yes Yes   Sig: Take 2 capsules by mouth 3 times daily Muscle Mag. 2 caps contain B1 20mg, B2 20mg, B6 10mg, magesium 20mg, manganese 2mg.   VENTOLIN  (90 BASE) MCG/ACT Inhaler Unknown at Unknown time Self No No   Sig: INHALE 2 PUFFS INTO THE LUNGS EVERY 4 HOURS AS NEEDED FOR SHORTNESS OF BREATH OR DIFFICULT BREATHING OR WHEEZING   acyclovir (ZOVIRAX) 400 MG tablet 2017 at Unknown time  No Yes   Sig: Take 1 tablet (400 mg) by mouth 2 times daily   allopurinol (ZYLOPRIM) 300 MG tablet 2017 at Unknown time  No Yes   Sig: Take 1 tablet (300 mg) by mouth daily   aluminum chloride (DRYSOL) 20 % external solution Past Month at Unknown time Self No Yes   Sig: Apply topically At Bedtime   bisacodyl (DULCOLAX) 5 MG EC tablet 2017 at Unknown time  No Yes   Sig: Take 3 tablets (15 mg) by mouth daily as needed for constipation   calcitRIOL (ROCALTROL) 0.25 MCG capsule 2017 at Unknown time  No Yes   Sig: TAKE 2 CAPSULES BY MOUTH EVERY MORNING AND TAKE 1 CAPSULE BY MOUTH EVERY NIGHT AT BEDTIME   Patient taking differently: TAKE 2 CAPSULES BY MOUTH EVERY MORNING AND TAKE 1 CAPSULE BY MOUTH at noon    calcium carbonate (TUMS) 500 MG chewable tablet 11/15/2017 at Unknown time Self Yes Yes   Sig: Take 2 tablets (1,000 mg) by mouth nightly as needed for other (cramps)   calcium citrate-vitamin D (CALCIUM CITRATE +D) 315-250 MG-UNIT TABS Unknown at Unknown time Self Yes No   Sig: Take 2 tablets by mouth 3 times daily   celecoxib (CELEBREX) 200 MG capsule 11/16/2017 at Unknown time  No Yes   Sig: TAKE ONE CAPSULE BY MOUTH TWICE DAILY    cetirizine (ZYRTEC ALLERGY) 10 MG tablet 11/16/2017 at Unknown time  No Yes   Sig: Take 1 tablet (10 mg) by mouth 3 times daily On hold for lab test.   cromolyn (OPTICROM) 4 % ophthalmic solution 11/16/2017 at Unknown time Self No Yes   Sig: Place 1 drop into both eyes 4 times daily   cyclobenzaprine (FLEXERIL) 10 MG tablet 11/16/2017 at Unknown time  No Yes   Sig: Take 1 tablet (10 mg) by mouth 3 times daily as needed   dexamethasone (DECADRON) 4 MG tablet Unknown at Unknown time  No No   Sig: Take 8 mg (2 tab) by mouth 11/1 and 11/2, then 4 mg (1 tab) by mouth 11/3.   Patient not taking: Reported on 11/16/2017   diazepam (VALIUM) 5 MG tablet 11/16/2017 at Unknown time Self No Yes   Sig: Take 1 tablet (5 mg) by mouth 3 times daily as needed For muscle spasms   Patient taking differently: Take 5 mg by mouth 3 times daily For muscle spasms   diclofenac (VOLTAREN) 1 % GEL topical gel 11/16/2017 at Unknown time  No Yes   Sig: Apply affected area two times daily PRN using enclosed dosing card.   diphenhydrAMINE (BENADRYL) 50 MG capsule Unknown at Unknown time  No No   Sig: Take 1 capsule (50 mg) by mouth nightly as needed for itching or sleep   Patient not taking: Reported on 11/16/2017   docusate sodium (COLACE) 100 MG tablet 11/16/2017 at Unknown time  No Yes   Sig: Take 100 mg by mouth daily   ferrous sulfate (IRON) 325 (65 FE) MG tablet 11/16/2017 at Unknown time  No Yes   Sig: Take 1 tablet (325 mg) by mouth 3 times daily (with meals)   fluconazole (DIFLUCAN) 200 MG tablet  11/16/2017 at Unknown time  No Yes   Sig: Take 1 tablet (200 mg) by mouth daily   furosemide (LASIX) 20 MG tablet   No No   Sig: Take 2 tablets (40 mg) by mouth daily for 2 days   hydrochlorothiazide (MICROZIDE) 12.5 MG capsule 11/16/2017 at Unknown time Self No Yes   Sig: Take 1 capsule (12.5 mg) by mouth daily   levofloxacin (LEVAQUIN) 250 MG tablet 11/16/2017 at Unknown time  No Yes   Sig: Take 1 tablet (250 mg) by mouth daily   levothyroxine (SYNTHROID/LEVOTHROID) 112 MCG tablet 11/16/2017 at Unknown time Self No Yes   Sig: Mon-Sat: (2 tabs daily) Sunday: 3 tabs   Patient taking differently: 112 mcg Mon-Sat: (2 tabs daily) Sunday: 3 tabs   lidocaine-prilocaine (EMLA) cream 11/16/2017 at Unknown time  No Yes   Sig: Apply topically as needed (port access pain.)   magic mouthwash suspension (diphenhydrAMINE, lidocaine, aluminum-magnesium & simethicone) Unknown at Unknown time  No No   Sig: Swish and spit 10 mLs in mouth every 6 hours as needed for mouth sores   methocarbamol (ROBAXIN) 750 MG tablet 11/16/2017 at Unknown time  No Yes   Sig: Take 1 tablet (750 mg) by mouth 4 times daily as needed . Ok to take a 5th Robaxin on very severe days.   Patient taking differently: Take 750 mg by mouth 4 times daily . Ok to take a 5th Robaxin on very severe days.   mometasone (ASMANEX 30 METERED DOSES) 110 MCG/INH inhaler Unknown at Unknown time Self No No   Sig: Inhale 1 puff into the lungs daily   montelukast (SINGULAIR) 10 MG tablet 11/16/2017 at Unknown time Self No Yes   Sig: TAKE TWO TABLETS BY MOUTH TWICE DAILY   morphine (MS CONTIN) 30 MG 12 hr tablet 11/16/2017 at Unknown time  No Yes   Sig: Take 1 tablet (30 mg) by mouth every 8 hours . Take an addition pill at night such that your evening dose is 60mg   ondansetron (ZOFRAN-ODT) 8 MG ODT tab 11/16/2017 at Unknown time  No Yes   Sig: Take 1 tablet (8 mg) by mouth every 8 hours as needed for nausea or vomiting   order for DME Unknown at Unknown time  No No   Sig:  Equipment being ordered: compression stockings. 20-30 mm or 30 - 40 mm as patient can tolerate. Physical therapy to determine if they should be above or below the knee.   order for DME Unknown at Unknown time  No No   Sig: Equipment being ordered: compression bra   Patient not taking: Reported on 11/16/2017   order for DME Unknown at Unknown time  No No   Sig: Compression stockings, medium grade, please measure and fit. Please dispense a pair.   oxyCODONE (ROXICODONE) 30 MG IR tablet 11/16/2017 at 1330  No Yes   Sig: Take 1 tablet (30 mg) by mouth every 4 hours as needed for moderate to severe pain   polyethylene glycol (MIRALAX) powder 11/16/2017 at Unknown time  No Yes   Sig: Take 17 g (1 capful) by mouth daily   potassium chloride SA (POTASSIUM CHLORIDE) 20 MEQ CR tablet 11/16/2017 at Unknown time Self No Yes   Sig: Take 1 tablet (20 mEq) by mouth daily   prochlorperazine (COMPAZINE) 10 MG tablet Past Week at Unknown time  No Yes   Sig: Take 1 tablet (10 mg) by mouth every 6 hours as needed for nausea or vomiting   ranitidine (ZANTAC) 75 MG tablet 11/16/2017 at Unknown time Self No Yes   Sig: Take 1 tablet (75 mg) by mouth 3 times daily   senna-docusate (SENOKOT-S;PERICOLACE) 8.6-50 MG per tablet 11/16/2017 at Unknown time  No Yes   Sig: Take 1-2 tablets by mouth 2 times daily as needed for constipation   tacrolimus (PROTOPIC) 0.1 % ointment Unknown at Unknown time Self No No   Sig: Apply topically 2 times daily   Patient not taking: Reported on 11/16/2017   triamcinolone (KENALOG) 0.025 % ointment Unknown at Unknown time Self No No   Sig: Apply topically 2 times daily Mix with 1 lb jar of vaseline or aquaphor   Patient not taking: Reported on 11/16/2017      Facility-Administered Medications: None     Allergies   Allergies   Allergen Reactions     Baclofen Other (See Comments) and Unknown     Other reaction(s): Edema, chest pain, seizures.      No Clinical Screening - See Comments Shortness Of Breath,  Palpitations, Anaphylaxis, Itching, Swelling, Difficulty breathing and Rash     Sukhjinder wipes- oral allergy -  July 2015: throat tightness from a Chinese herbal medicine Guillen Raciel Blank Barry Tran     Serotonin Anxiety, Other (See Comments) and Swelling     Seizures      Amitriptyline Hcl Swelling     Birch Trees      Potatoes, carrots, cherries, celery, apple, pears, plums, peaches, parsnip, kiwi, hazelnuts, and apricots,      Blue Dyes Itching     Headaches       Cymbalta Other (See Comments)     Flushing, tremor/muscle twitching and edema     Gabapentin Other (See Comments)     edema  Systemic edema, weaned off from Feb to March per Dr. Dowd.    edema     Grass      Mugwort [Artemisia Vulgaris]      Various spices     Ragweeds      Melons, bananas, cucumbers, zucchini.     Topamax      Nortriptyline Itching, Visual Disturbance, Swelling, GI Disturbance, Anxiety, Other (See Comments) and Nausea     Other reaction(s): Swelling       Social History   I have reviewed this patient's social history and updated it with pertinent information if needed. Marcelluscruzito GARNETT Hugo  reports that she has never smoked. She has never used smokeless tobacco. She reports that she does not drink alcohol or use illicit drugs.    Family History   I have reviewed this patient's family history and updated it with pertinent information if needed.   Family History   Problem Relation Age of Onset     Allergies Mother      Arthritis Mother      CANCER Mother      uterine/uterine     Hypertension Mother      on HCTZ     Hyperlipidemia Mother      CEREBROVASCULAR DISEASE Mother      s/p brain surgery     Breast Cancer Mother      Depression Mother      Anxiety Disorder Mother      Anesthesia Reaction Mother      Asthma Mother      Skin Cancer Mother      HEART DISEASE Father      DIABETES Father      Coronary Artery Disease Father      Hypertension Father      Hyperlipidemia Father      CEREBROVASCULAR DISEASE Father      Multiple strokes     Obesity  Father      DIABETES Brother      Depression Brother      Hypertension Brother      CANCER Maternal Grandfather      pancreatic cancer/stomach cancer     Prostate Cancer Maternal Grandfather      Prostrate and Bladder     Other Cancer Maternal Grandfather      Pancreatic 1988, Bladder 1977     CANCER Maternal Grandmother      uterine     Breast Cancer Maternal Grandmother      Skin Cancer Maternal Grandmother      CANCER Brother 57     pancreatic cancer     DIABETES Brother      Breast Cancer Cousin      Grand mothers sister     Other Cancer Brother      Stage 3 Pancreatic 2-2015     Depression Brother      Asthma Brother      Obesity Brother      Anesthesia Reaction Other      H/a, itching, drug interactions     Asthma Other      CANCER Brother      Pancreatic      Thyroid Disease No family hx of        Review of Systems   The 10 point Review of Systems is negative other than noted in the HPI or here.     Physical Exam   Temp: 97.6  F (36.4  C) Temp src: Oral BP: 107/80   Heart Rate: 125 Resp: 18 SpO2: 97 % O2 Device: None (Room air)    Vital Signs with Ranges  Temp:  [97.6  F (36.4  C)-98.1  F (36.7  C)] 97.6  F (36.4  C)  Pulse:  [121] 121  Heart Rate:  [125] 125  Resp:  [16-18] 18  BP: (107-125)/(73-80) 107/80  SpO2:  [97 %-98 %] 97 %  186 lbs 15.2 oz    Constitutional: Pleasant female seen sitting in bed. No apparent distress, and appears stated age.  Eyes: Lids and lashes normal, sclera clear, conjunctiva normal.  ENT: Normocephalic, oral pharynx with moist mucus membranes, tonsils without erythema or exudates, gums normal and good dentition.   Respiratory: No increased work of breathing, good air exchange, clear to auscultation bilaterally, no crackles or wheezing.  Cardiovascular: Regular rate and rhythm, normal S1 and S2, and no murmur noted.  GI: No masses or scars. +BS. Soft. Tenderness on palpation to LUQ.  Skin: No bruising or bleeding, no redness, warmth, or swelling, no rashes, no lesions, no  jaundice.  Extremities: There is no redness, warmth, or swelling of the joints. +1 lower extremity edema, compression stockings in place. No cyanosis.  Neurologic: Awake, alert, oriented to name, place and time.    Vascular access: Port, c/d/i    Data   ROUTINE IP LABS (Last four results)  BMP  Recent Labs  Lab 11/16/17  0658 11/13/17  1647 11/10/17  1000    143 143   POTASSIUM 3.8 3.8 3.9   CHLORIDE 104 107 108   ELMO 8.4* 8.1* 8.0*   CO2 28 28 28   BUN 14 13 16   CR 0.80 0.99 0.90   * 111* 107*     CBC  Recent Labs  Lab 11/16/17  0658 11/13/17  1647 11/10/17  1000   WBC 6.1 8.5 8.9   RBC 3.62* 3.64* 3.42*   HGB 9.9* 9.9* 9.0*   HCT 31.7* 32.2* 29.9*   MCV 88 89 87   MCH 27.3 27.2 26.3*   MCHC 31.2* 30.7* 30.1*   RDW 21.5* 21.3* 20.0*    314 215     INRNo lab results found in last 7 days.

## 2017-11-17 ENCOUNTER — APPOINTMENT (OUTPATIENT)
Dept: GENERAL RADIOLOGY | Facility: CLINIC | Age: 57
End: 2017-11-17
Attending: PHYSICIAN ASSISTANT
Payer: COMMERCIAL

## 2017-11-17 LAB
ALBUMIN SERPL-MCNC: 2.5 G/DL (ref 3.4–5)
ALP SERPL-CCNC: 85 U/L (ref 40–150)
ALT SERPL W P-5'-P-CCNC: 23 U/L (ref 0–50)
ANION GAP SERPL CALCULATED.3IONS-SCNC: 8 MMOL/L (ref 3–14)
AST SERPL W P-5'-P-CCNC: 13 U/L (ref 0–45)
BASOPHILS # BLD AUTO: 0 10E9/L (ref 0–0.2)
BASOPHILS NFR BLD AUTO: 0.2 %
BILIRUB SERPL-MCNC: 0.2 MG/DL (ref 0.2–1.3)
BUN SERPL-MCNC: 12 MG/DL (ref 7–30)
CALCIUM SERPL-MCNC: 8.3 MG/DL (ref 8.5–10.1)
CHLORIDE SERPL-SCNC: 107 MMOL/L (ref 94–109)
CO2 SERPL-SCNC: 28 MMOL/L (ref 20–32)
CREAT SERPL-MCNC: 0.7 MG/DL (ref 0.52–1.04)
DIFFERENTIAL METHOD BLD: ABNORMAL
EOSINOPHIL # BLD AUTO: 0 10E9/L (ref 0–0.7)
EOSINOPHIL NFR BLD AUTO: 0 %
ERYTHROCYTE [DISTWIDTH] IN BLOOD BY AUTOMATED COUNT: 21.9 % (ref 10–15)
GFR SERPL CREATININE-BSD FRML MDRD: 87 ML/MIN/1.7M2
GLUCOSE CSF-MCNC: 78 MG/DL (ref 40–70)
GLUCOSE SERPL-MCNC: 168 MG/DL (ref 70–99)
GRAM STN SPEC: NORMAL
HCT VFR BLD AUTO: 30.8 % (ref 35–47)
HGB BLD-MCNC: 9.4 G/DL (ref 11.7–15.7)
IMM GRANULOCYTES # BLD: 0.3 10E9/L (ref 0–0.4)
IMM GRANULOCYTES NFR BLD: 3.2 %
LYMPHOCYTES # BLD AUTO: 0.3 10E9/L (ref 0.8–5.3)
LYMPHOCYTES NFR BLD AUTO: 3.1 %
MCH RBC QN AUTO: 27.1 PG (ref 26.5–33)
MCHC RBC AUTO-ENTMCNC: 30.5 G/DL (ref 31.5–36.5)
MCV RBC AUTO: 89 FL (ref 78–100)
MONOCYTES # BLD AUTO: 0.1 10E9/L (ref 0–1.3)
MONOCYTES NFR BLD AUTO: 1.6 %
NEUTROPHILS # BLD AUTO: 7.4 10E9/L (ref 1.6–8.3)
NEUTROPHILS NFR BLD AUTO: 91.9 %
NRBC # BLD AUTO: 0 10*3/UL
NRBC BLD AUTO-RTO: 1 /100
PLATELET # BLD AUTO: 303 10E9/L (ref 150–450)
POTASSIUM SERPL-SCNC: 4 MMOL/L (ref 3.4–5.3)
PROT CSF-MCNC: 53 MG/DL (ref 15–60)
PROT SERPL-MCNC: 5.8 G/DL (ref 6.8–8.8)
RBC # BLD AUTO: 3.47 10E12/L (ref 3.8–5.2)
SODIUM SERPL-SCNC: 142 MMOL/L (ref 133–144)
SPECIMEN SOURCE: NORMAL
WBC # BLD AUTO: 8 10E9/L (ref 4–11)

## 2017-11-17 PROCEDURE — 25000132 ZZH RX MED GY IP 250 OP 250 PS 637: Performed by: PHYSICIAN ASSISTANT

## 2017-11-17 PROCEDURE — 25000125 ZZHC RX 250: Performed by: PHYSICIAN ASSISTANT

## 2017-11-17 PROCEDURE — 87205 SMEAR GRAM STAIN: CPT | Performed by: PHYSICIAN ASSISTANT

## 2017-11-17 PROCEDURE — 89050 BODY FLUID CELL COUNT: CPT | Performed by: PHYSICIAN ASSISTANT

## 2017-11-17 PROCEDURE — 25000128 H RX IP 250 OP 636: Performed by: PHYSICIAN ASSISTANT

## 2017-11-17 PROCEDURE — 25000131 ZZH RX MED GY IP 250 OP 636 PS 637: Performed by: PHYSICIAN ASSISTANT

## 2017-11-17 PROCEDURE — 82945 GLUCOSE OTHER FLUID: CPT | Performed by: PHYSICIAN ASSISTANT

## 2017-11-17 PROCEDURE — 96450 CHEMOTHERAPY INTO CNS: CPT

## 2017-11-17 PROCEDURE — 25000125 ZZHC RX 250: Performed by: RADIOLOGY

## 2017-11-17 PROCEDURE — 3E0R305 INTRODUCTION OF OTHER ANTINEOPLASTIC INTO SPINAL CANAL, PERCUTANEOUS APPROACH: ICD-10-PCS | Performed by: INTERNAL MEDICINE

## 2017-11-17 PROCEDURE — 80053 COMPREHEN METABOLIC PANEL: CPT | Performed by: PHYSICIAN ASSISTANT

## 2017-11-17 PROCEDURE — 40001004 ZZHCL STATISTIC FLOW INT 9-15 ABY TC 88188: Performed by: PHYSICIAN ASSISTANT

## 2017-11-17 PROCEDURE — 85025 COMPLETE CBC W/AUTO DIFF WBC: CPT | Performed by: PHYSICIAN ASSISTANT

## 2017-11-17 PROCEDURE — 84157 ASSAY OF PROTEIN OTHER: CPT | Performed by: PHYSICIAN ASSISTANT

## 2017-11-17 PROCEDURE — 20000002 ZZH R&B BMT INTERMEDIATE

## 2017-11-17 PROCEDURE — 88185 FLOWCYTOMETRY/TC ADD-ON: CPT | Performed by: PHYSICIAN ASSISTANT

## 2017-11-17 PROCEDURE — 88184 FLOWCYTOMETRY/ TC 1 MARKER: CPT | Performed by: PHYSICIAN ASSISTANT

## 2017-11-17 PROCEDURE — 87070 CULTURE OTHR SPECIMN AEROBIC: CPT | Performed by: PHYSICIAN ASSISTANT

## 2017-11-17 PROCEDURE — S0169 CALCITROL: HCPCS | Performed by: PHYSICIAN ASSISTANT

## 2017-11-17 RX ORDER — FUROSEMIDE 20 MG
20 TABLET ORAL 2 TIMES DAILY
Status: DISCONTINUED | OUTPATIENT
Start: 2017-11-17 | End: 2017-11-21 | Stop reason: HOSPADM

## 2017-11-17 RX ORDER — LIDOCAINE HYDROCHLORIDE 10 MG/ML
30 INJECTION, SOLUTION INFILTRATION; PERINEURAL ONCE
Status: COMPLETED | OUTPATIENT
Start: 2017-11-17 | End: 2017-11-17

## 2017-11-17 RX ADMIN — POLYETHYLENE GLYCOL 3350 17 G: 17 POWDER, FOR SOLUTION ORAL at 05:21

## 2017-11-17 RX ADMIN — LIDOCAINE HYDROCHLORIDE 6 ML: 10 INJECTION, SOLUTION EPIDURAL; INFILTRATION; INTRACAUDAL; PERINEURAL at 12:03

## 2017-11-17 RX ADMIN — Medication 2 TABLET: at 05:15

## 2017-11-17 RX ADMIN — DIAZEPAM 5 MG: 5 TABLET ORAL at 12:30

## 2017-11-17 RX ADMIN — CELECOXIB 200 MG: 100 CAPSULE ORAL at 18:07

## 2017-11-17 RX ADMIN — FERROUS SULFATE TAB 325 MG (65 MG ELEMENTAL FE) 325 MG: 325 (65 FE) TAB at 12:42

## 2017-11-17 RX ADMIN — ETOPOSIDE 100 MG: 20 INJECTION, SOLUTION, CONCENTRATE INTRAVENOUS at 20:05

## 2017-11-17 RX ADMIN — MONTELUKAST SODIUM 20 MG: 10 TABLET, FILM COATED ORAL at 05:18

## 2017-11-17 RX ADMIN — LIDOCAINE HYDROCHLORIDE: 20 JELLY TOPICAL at 12:46

## 2017-11-17 RX ADMIN — BISACODYL 5 MG: 5 TABLET, COATED ORAL at 05:15

## 2017-11-17 RX ADMIN — Medication 1 CAPSULE: at 18:09

## 2017-11-17 RX ADMIN — LEVOTHYROXINE SODIUM 224 MCG: 112 TABLET ORAL at 05:13

## 2017-11-17 RX ADMIN — HYDROCHLOROTHIAZIDE 12.5 MG: 12.5 CAPSULE ORAL at 12:43

## 2017-11-17 RX ADMIN — METHOCARBAMOL 750 MG: 750 TABLET ORAL at 05:13

## 2017-11-17 RX ADMIN — MORPHINE SULFATE 60 MG: 60 TABLET, EXTENDED RELEASE ORAL at 20:54

## 2017-11-17 RX ADMIN — CROMOLYN SODIUM 1 DROP: 40 SOLUTION/ DROPS OPHTHALMIC at 18:11

## 2017-11-17 RX ADMIN — CETIRIZINE HYDROCHLORIDE 10 MG: 10 TABLET, FILM COATED ORAL at 18:08

## 2017-11-17 RX ADMIN — DIAZEPAM 5 MG: 5 TABLET ORAL at 05:16

## 2017-11-17 RX ADMIN — Medication 5000 UNITS: at 18:08

## 2017-11-17 RX ADMIN — MORPHINE SULFATE 30 MG: 15 TABLET, EXTENDED RELEASE ORAL at 05:15

## 2017-11-17 RX ADMIN — FERROUS SULFATE TAB 325 MG (65 MG ELEMENTAL FE) 325 MG: 325 (65 FE) TAB at 05:17

## 2017-11-17 RX ADMIN — PREDNISONE 60 MG: 50 TABLET ORAL at 09:05

## 2017-11-17 RX ADMIN — MORPHINE SULFATE 30 MG: 15 TABLET, EXTENDED RELEASE ORAL at 12:43

## 2017-11-17 RX ADMIN — FUROSEMIDE 20 MG: 20 TABLET ORAL at 05:16

## 2017-11-17 RX ADMIN — CELECOXIB 200 MG: 100 CAPSULE ORAL at 05:16

## 2017-11-17 RX ADMIN — DICLOFENAC SODIUM 2 G: 10 GEL TOPICAL at 12:33

## 2017-11-17 RX ADMIN — CALCITRIOL 0.25 MCG: 0.25 CAPSULE, LIQUID FILLED ORAL at 05:14

## 2017-11-17 RX ADMIN — Medication 2 TABLET: at 18:21

## 2017-11-17 RX ADMIN — OXYCODONE HYDROCHLORIDE 30 MG: 10 TABLET ORAL at 04:20

## 2017-11-17 RX ADMIN — CROMOLYN SODIUM 1 DROP: 40 SOLUTION/ DROPS OPHTHALMIC at 12:33

## 2017-11-17 RX ADMIN — CROMOLYN SODIUM 1 DROP: 40 SOLUTION/ DROPS OPHTHALMIC at 05:19

## 2017-11-17 RX ADMIN — ONDANSETRON HYDROCHLORIDE 16 MG: 8 TABLET, FILM COATED ORAL at 18:06

## 2017-11-17 RX ADMIN — RANITIDINE 75 MG: 75 TABLET, FILM COATED ORAL at 12:42

## 2017-11-17 RX ADMIN — DICLOFENAC SODIUM 2 G: 10 GEL TOPICAL at 05:19

## 2017-11-17 RX ADMIN — DIAZEPAM 5 MG: 5 TABLET ORAL at 18:07

## 2017-11-17 RX ADMIN — POTASSIUM CHLORIDE 20 MEQ: 750 TABLET, EXTENDED RELEASE ORAL at 05:17

## 2017-11-17 RX ADMIN — METHOCARBAMOL 750 MG: 750 TABLET ORAL at 12:42

## 2017-11-17 RX ADMIN — METHOCARBAMOL 750 MG: 750 TABLET ORAL at 18:07

## 2017-11-17 RX ADMIN — SENNOSIDES AND DOCUSATE SODIUM 2 TABLET: 8.6; 5 TABLET ORAL at 05:18

## 2017-11-17 RX ADMIN — PREDNISONE 60 MG: 50 TABLET ORAL at 20:54

## 2017-11-17 RX ADMIN — ACYCLOVIR 400 MG: 400 TABLET ORAL at 05:17

## 2017-11-17 RX ADMIN — OXYCODONE HYDROCHLORIDE 30 MG: 10 TABLET ORAL at 20:54

## 2017-11-17 RX ADMIN — FLUCONAZOLE 200 MG: 200 TABLET ORAL at 05:18

## 2017-11-17 RX ADMIN — CALCITRIOL 0.5 MCG: 0.5 CAPSULE, LIQUID FILLED ORAL at 12:30

## 2017-11-17 RX ADMIN — Medication 2 TABLET: at 12:30

## 2017-11-17 RX ADMIN — OXYCODONE HYDROCHLORIDE 30 MG: 10 TABLET ORAL at 09:05

## 2017-11-17 RX ADMIN — ALLOPURINOL 300 MG: 300 TABLET ORAL at 05:16

## 2017-11-17 RX ADMIN — OXYCODONE HYDROCHLORIDE 30 MG: 10 TABLET ORAL at 15:51

## 2017-11-17 RX ADMIN — RANITIDINE 75 MG: 75 TABLET, FILM COATED ORAL at 18:21

## 2017-11-17 RX ADMIN — METHOTREXATE: 25 INJECTION, SOLUTION INTRA-ARTERIAL; INTRAMUSCULAR; INTRATHECAL; INTRAVENOUS at 11:58

## 2017-11-17 RX ADMIN — RANITIDINE 75 MG: 75 TABLET, FILM COATED ORAL at 05:16

## 2017-11-17 RX ADMIN — DOCUSATE SODIUM 100 MG: 100 CAPSULE, LIQUID FILLED ORAL at 05:18

## 2017-11-17 RX ADMIN — CETIRIZINE HYDROCHLORIDE 10 MG: 10 TABLET, FILM COATED ORAL at 05:17

## 2017-11-17 RX ADMIN — CETIRIZINE HYDROCHLORIDE 10 MG: 10 TABLET, FILM COATED ORAL at 12:31

## 2017-11-17 RX ADMIN — ACYCLOVIR 400 MG: 400 TABLET ORAL at 18:08

## 2017-11-17 RX ADMIN — DOXORUBICIN HYDROCHLORIDE 0.8 MG: 2 INJECTION, SOLUTION INTRAVENOUS at 20:07

## 2017-11-17 RX ADMIN — FERROUS SULFATE TAB 325 MG (65 MG ELEMENTAL FE) 325 MG: 325 (65 FE) TAB at 18:21

## 2017-11-17 RX ADMIN — MONTELUKAST SODIUM 20 MG: 10 TABLET, FILM COATED ORAL at 18:07

## 2017-11-17 RX ADMIN — METHOCARBAMOL 750 MG: 750 TABLET ORAL at 15:51

## 2017-11-17 RX ADMIN — DICLOFENAC SODIUM 2 G: 10 GEL TOPICAL at 18:11

## 2017-11-17 RX ADMIN — FUROSEMIDE 20 MG: 20 TABLET ORAL at 12:43

## 2017-11-17 ASSESSMENT — PAIN DESCRIPTION - DESCRIPTORS
DESCRIPTORS: ACHING;DISCOMFORT
DESCRIPTORS: ACHING;DISCOMFORT
DESCRIPTORS: ACHING
DESCRIPTORS: ACHING;DISCOMFORT
DESCRIPTORS: ACHING;DISCOMFORT

## 2017-11-17 NOTE — PROGRESS NOTES
"[facility], [place]    Hematology / Oncology Progress Note    Date of Service (when I saw the patient): 11/17/2017     Assessment & Plan   Tisha Arias is a 57 year old female with high grade DLBCL and PMHx significant for breast cancer s/p bilateral mastectomies & BENJIE/BSO, papillary thyroid cancer s/p total thyroidectomy, chronic chest wall pain, muscle spasms, asthma and h/o Rachael en Y gastric bypass, who presents for Cycle 3 of DA-R-EPOCH.      HEME  # High grade B cell lymphoma. \"Double hit-like\". Primary is Dr. Sauceda. Patient diagnosed August 2017. Lytic lesion of right 10th rib with extension. Disease localized above the diaphragm in thoracic and paravertebral soft tissue and mediastinal lymphadenopathy. 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 Staging LP and BMBx were negative for lymphoma. Initiated cycle 1 DA-R-EPOCH on 10/6/17. S/p cycle 2 10/27/17. She now presents for cycle 3 DA-R-EPOCH.  -Port-a-cath in place  -Labs ok to begin therapy      Treatment plan: Cycle 3 DA-R-EPOCH (Day 1=11/16/17).  Today is Day 2.  -Rituxan 750 mg (Day 1)  -Prednisone 60 mg  (Day 1-5)  -Etoposide 100 mg CIVI (Day 1-4)  -Vincristine 0.8 mg CIVI (Day 1-4)  -Cyclophosphamide 1500 mg (Day 5)  -LP with IT chemotherapy scheduled Friday 11/17.  -Premeds: Tylenol and benadryl prior to Rituxan, Zofran (D1-5), Emend (d5), Dex (D6-7).  -Neulasta on discharge (scheduled for 11/22).  -Discharge with D6-7 Dexamethasone      #Anemia. 2/2 disease and chemotherapy.  -Transfuse for Hgb <8 and plts <10K.      #Stage 1, ER/PA-positive, HER2-negative breast cancer. Follows with Dr. Crawley. Diagnosed in 2011. Oncotype recurrence score of 11 (7% recurrence risk after 5 years of tamoxifen). S/p bilateral mastectomies and BENJIE/BSO in 2012. For endocrine therapy she was on Arimidex " from 1667-7815, then changed to Tamoxifen b/c of arthralgias on Arimidex. Has been on Tamoxifen since 2014 now held while being treated for lymphoma. Last dose 10/5.   -F/u with Dr. Crawley 11/27/17.      # Papillary thyroid cancer. Follows with Dr. Castro. Diagnosed in 2013. S/p total thyroidectomy and CND. Received ETOH treatment August 2014, October 2014 and December 2014. Has post-surgical hypothyroidism.  -Continue PTA Levothyroxine.  -Continue PTA calcitriol.    NEURO  # Headaches. Pt states she has been having increasing migraine headaches, about daily, for the past two weeks. No vision or hearing changes. Located in occipital region and radiates around head. Tenderness on palpation to neck muscles. Suspect these could be tension headaches related to muscle tightness.  -Continue muscle relaxants and tylenol for pain control  -Will have LP today, though very unlikely to have meningitis  -Hold off on brain imaging      GI  # H/o Rachael en Y gastric bypass. Likely affecting absorption, thus patient is on multiple supplements.  -Continue Calcium and Magnesium replacements.      Pain  # Chronic chest wall pain after mastectomy.   -Continue MS Contin 30 mg morning and afternoon and 60 mg at night.  -Oxycodone 30 mg q4hrs prn.  -Celebrex 200 BID.   -Lidocaine cream prn.      #Chronic muscle cramps.   -Continue KCl 20 mEq daily, Robaxin 750 mg QID, Valium 5 mg TID prn, Flexeril prn.      ID  #Anti-infectives.  -Continue PTA acyclovir and fluconazole.  -Will hold Levaquin ppx as patient is not neutropenic. To resume on discharge or when ANC <1000.      MISC  #Lymphedema. Of LE. Improved per patient. Wearing compression stockings.   -Continue PTA HCTZ and Lasix.  -Monitor BMP daily      #Asthma, allergies.   -Continue PTA singular, zyrtec.  -Patient states she is not taking her inhalers. Requests only albuterol prn rescue.      FEN  -IVF per chemo regimen  -PTA electrolyte replacements and lyte  protocol  -RDAT      PPx  -VTE: Lovenox ppx  -PUD: PTA ranitidine  -Bowels: PTA docusate and miralax       Dispo: Pending completion of chemotherapy and resolution of any acute toxicities, likely 4-5 day stay (~11/20 or 11/21).      Above plan discussed with staff physician, Dr. Albert Dyer PA-C  Hematology/Oncology  Pager: [phone]    Interval History   Doing well this morning, but is having more headaches. No vision or hearing changes. Located in occipital region and radiates around head. Tenderness on palpation to neck muscles. Planning for LP today. Hoping the lidocaine gel will help her chest pain. Discussed plan to get Neulasta on Wednesday and then follow-up with Dr. Sauceda.     Physical Exam   Temp: 98.5  F (36.9  C) Temp src: Axillary BP: 100/72 Pulse: 109 Heart Rate: 92 Resp: 18 SpO2: 95 % O2 Device: None (Room air)    Vitals:    11/16/17 1400 11/17/17 0839   Weight: 84.8 kg (186 lb 15.2 oz) 84.5 kg (186 lb 3.2 oz)     Vital Signs with Ranges  Temp:  [97.6  F (36.4  C)-98.5  F (36.9  C)] 98.5  F (36.9  C)  Pulse:  [109-123] 109  Heart Rate:  [] 92  Resp:  [18] 18  BP: ()/(63-80) 100/72  SpO2:  [92 %-97 %] 95 %  I/O last 3 completed shifts:  In: 2087.6 [P.O.:780; IV Piggyback:1307.6]  Out: 800 [Urine:800]    Constitutional: Pleasant female seen sitting in bed. No apparent distress, and appears stated age.  Eyes: Lids and lashes normal, sclera clear, conjunctiva normal.  ENT: Normocephalic, oral pharynx with moist mucus membranes, tonsils without erythema or exudates, gums normal and good dentition.   Respiratory: No increased work of breathing, good air exchange, clear to auscultation bilaterally, no crackles or wheezing.  Cardiovascular: Regular rate and rhythm, normal S1 and S2, and no murmur noted.  GI: No masses or scars. +BS. Soft. Tenderness on palpation to LUQ.  Skin: No bruising or bleeding, no redness, warmth, or swelling, no rashes, no lesions, no  jaundice.  Extremities: There is no redness, warmth, or swelling of the joints. +1 lower extremity edema, compression stockings in place. No cyanosis.  Neurologic: Awake, alert, oriented to name, place and time.    Vascular access: Port, c/d/i    Medications     - MEDICATION INSTRUCTIONS -       - MEDICATION INSTRUCTIONS -       NaCl         [START ON 11/18/2017] enoxaparin  40 mg Subcutaneous Q24H     furosemide  20 mg Oral BID     lidocaine  30 mL Subcutaneous Once     PROBIOTIC DAILY  1 capsule Oral QPM     Digestzymes 2 capsules  2 capsule Oral or Feeding Tube TID     UNABLE TO FIND 1 tablet  1 tablet Oral Daily     Calcium D-Glucarate 3 tablets  3 capsule Oral TID     Muscle Mag 2 caps  2 capsule Oral TID     ondansetron  16 mg Oral Q24H     [START ON 11/20/2017] fosaprepitant (EMEND) 150 mg intermittent infusion  150 mg Intravenous Once     [START ON 11/22/2017] dexamethasone  8 mg Oral Daily     INTRATHECAL chemo - Cytarabine and/or methotrexate and/or Hydrocortisone   Intrathecal Once     predniSONE  30 mg/m2 (Treatment Plan Recorded) Oral BID     Chemotherapy Infusing-Continuous Infusion   Does not apply Q8H     etoposide (TOPOSAR) chemo infusion  50 mg/m2 (Treatment Plan Recorded) Intravenous Q24H     vinCRIStine/DOXOrubicin (ONCOVIN/ADRIAMYCIN) chemo infusion  0.4 mg/m2 (Treatment Plan Recorded) Intravenous Q24H     [START ON 11/20/2017] cyclophosphamide (CYTOXAN) chemo infusion  1,500 mg Intravenous Once     [START ON 11/21/2017] filgrastim (NEUPOGEN/GRANIX) intravenous  5 mcg/kg (Treatment Plan Recorded) Intravenous Daily at 8 pm     acyclovir  400 mg Oral BID     allopurinol  300 mg Oral Daily     calcium citrate-vitamin D  2 tablet Oral TID     celecoxib  200 mg Oral BID     cetirizine  10 mg Oral TID     diazepam  5 mg Oral TID     ferrous sulfate  325 mg Oral TID w/meals     fluconazole  200 mg Oral Daily     levothyroxine  224 mcg Oral Once per day on Mon Tue Wed Thu Fri Sat     methocarbamol  750  mg Oral 4x Daily     montelukast  20 mg Oral BID     morphine  30 mg Oral BID     morphine (MS CONTIN) 12 hr tablet 60 mg  60 mg Oral Daily     potassium chloride SA  20 mEq Oral Daily     ranitidine  75 mg Oral TID     senna-docusate  2 tablet Oral TID     docusate sodium  100 mg Oral TID     hydrochlorothiazide  12.5 mg Oral Daily     polyethylene glycol  17 g Oral TID     cholecalciferol  5,000 Units Oral Daily     bisacodyl  5 mg Oral BID     cromolyn  1 drop Both Eyes 4x Daily     diclofenac  2 g Topical 4x Daily     [START ON 11/19/2017] levothyroxine  336 mcg Oral Weekly     calcitRIOL  0.25 mcg Oral QAM     calcitRIOL  0.5 mcg Oral Daily with lunch     heparin lock flush  5-10 mL Intracatheter Q24H     heparin  5 mL Intracatheter Q28 Days       Data   ROUTINE IP LABS (Last four results)  BMP  Recent Labs  Lab 11/17/17  0405 11/16/17  0658 11/13/17  1647    141 143   POTASSIUM 4.0 3.8 3.8   CHLORIDE 107 104 107   ELMO 8.3* 8.4* 8.1*   CO2 28 28 28   BUN 12 14 13   CR 0.70 0.80 0.99   * 143* 111*     CBC  Recent Labs  Lab 11/17/17  0405 11/16/17  0658 11/13/17  1647   WBC 8.0 6.1 8.5   RBC 3.47* 3.62* 3.64*   HGB 9.4* 9.9* 9.9*   HCT 30.8* 31.7* 32.2*   MCV 89 88 89   MCH 27.1 27.3 27.2   MCHC 30.5* 31.2* 30.7*   RDW 21.9* 21.5* 21.3*    345 314     INRNo lab results found in last 7 days.

## 2017-11-17 NOTE — PROCEDURES
[facility], [place]     Neuroradiology Procedure Note     Lumbar Puncture Under Fluoroscopic Guidance:      11/17/2017 12:17 PM    Performed by: Cj Ayon MD, Arturo Jain MD  Authorized by: .Arturo Jain MD,Cj Ayon MD    Indications: Intrathecal chemotherapy    Consent given by: Patient who states understanding of the procedure being performed after discussing the risks, benefits and alternatives.    Prior to the start of the procedure and with procedural staff participation, I verbally confirmed the patient s identity using two indicators, relevant allergies, that the procedure was appropriate and matched the consent or emergent situation, and that the correct equipment/implants were available. Immediately prior to starting the procedure I conducted the Time Out with the procedural staff and re-confirmed the patient s name, procedure, and site/side. (The Joint Commission universal protocol was followed.) Yes    Procedure:    Under sterile conditions the patient was positioned lying supine. Using fluoroscopy, needle entrance side was defined.  Betadine solution and sterile drapes were utilized.  Local anesthetic at the site: 5 ml of lidocaine 1% without epinephrine.    A 22 gauge 3.5 inch spinal needle was inserted at the L2-L3 interspace.  Opening Pressurewas not checked.  A total of 8mL of clear and colorless spinal fluid was obtained and sent to the laboratory.   After the needle was removed, a bandaid and pressure were applied and the patient was instructed to stay horizontal until the results were back.    Complications:  None  Patient tolerance: Patient tolerated the procedure well with no immediate complications.    Condition: Stable Patient is transferred to Inpatient unit for post procedure observation.    Cj Ayon 11/17/2017 12:17 PM

## 2017-11-17 NOTE — PROCEDURES
Intrathecal Chemo Administration Note   Elinor Arias  November 17, 2017    DIAGNOSIS: high grade DLBCL   PROCEDURE: Intrathecal chemo administration   LOCATION: Radiology   INDICATION: Cycle 3 DA-R-EPOCH w IT chemo  Lumbar puncture performed and CSF samples collected by the General Radiology team under fluoroscopy.   This writer then administered methotrexate 12mg in 6ml preservative free NS without apparent complication. Chemotherapy previously double checked by two RNs and also verified by this writer and Carlyn Carvalho (radiology technician).   Complications: None immediately.   Chemo instillation performed by: Mariza Green NP [id number].

## 2017-11-17 NOTE — PLAN OF CARE
Problem: Patient Care Overview  Goal: Plan of Care/Patient Progress Review  Outcome: No Change   11/17/17 0631   OTHER   Plan Of Care Reviewed With patient   Plan of Care Review   Progress no change     AVSS. Oxy x1 for back pain. 24hr etoposide and vincristine running. Blood return noted. Getting MTX today. LP today at 1130. No replacements needed.  Continue POC.

## 2017-11-17 NOTE — PLAN OF CARE
Problem: Chemotherapy Effects (Adult)  Goal: Signs and Symptoms of Listed Potential Problems Will be Absent, Minimized or Managed (Chemotherapy Effects)  Signs and symptoms of listed potential problems will be absent, minimized or managed by discharge/transition of care (reference Chemotherapy Effects (Adult) CPG).   Outcome: No Change   11/16/17 2251   Chemotherapy Effects   Problems Assessed (Chemotherapy Effects) all   Problems Present (Chemotherapy Effects) nausea and vomiting     VSS. Rituxan given without incident. Chemo started this evening; etoposide running at 23.1 cc/hour and Vincristine/Doxo running at 44.2 cc/hour. Oxy given for back and abdominal pain. Patient says that she uses lidocaine gel at home for sternal/chest wall pain- MD paged for lidocaine patches but no orders were received. She reported nausea after eating dinner- compazine given with good relief. She will get IT chemo tomorrow- scheduled for 11am.

## 2017-11-17 NOTE — PLAN OF CARE
"Problem: Patient Care Overview  Goal: Plan of Care/Patient Progress Review  Outcome: No Change  BP 91/75 (BP Location: Left arm)  Pulse 123  Temp 97.8  F (36.6  C) (Axillary)  Resp 18  Ht 1.605 m (5' 3.19\")  Wt 84.8 kg (186 lb 15.2 oz)  SpO2 96%  BMI 32.92 kg/m2    Pt admitted for chemotherapy infusion.  Currently receiving Rituxan, then Etoposide/Vincristine.  BPs run 90-100s/70s. Continue to monitor.    Problem: Chemotherapy Effects (Adult)  Goal: Signs and Symptoms of Listed Potential Problems Will be Absent, Minimized or Managed (Chemotherapy Effects)  Signs and symptoms of listed potential problems will be absent, minimized or managed by discharge/transition of care (reference Chemotherapy Effects (Adult) CPG).   Outcome: No Change   11/16/17 1854   Chemotherapy Effects   Problems Assessed (Chemotherapy Effects) all   Problems Present (Chemotherapy Effects) none         "

## 2017-11-18 ENCOUNTER — APPOINTMENT (OUTPATIENT)
Dept: GENERAL RADIOLOGY | Facility: CLINIC | Age: 57
End: 2017-11-18
Attending: PHYSICIAN ASSISTANT
Payer: COMMERCIAL

## 2017-11-18 LAB
ALBUMIN SERPL-MCNC: 2.6 G/DL (ref 3.4–5)
ALP SERPL-CCNC: 80 U/L (ref 40–150)
ALT SERPL W P-5'-P-CCNC: 21 U/L (ref 0–50)
ANION GAP SERPL CALCULATED.3IONS-SCNC: 8 MMOL/L (ref 3–14)
AST SERPL W P-5'-P-CCNC: 12 U/L (ref 0–45)
BASOPHILS # BLD AUTO: 0 10E9/L (ref 0–0.2)
BASOPHILS NFR BLD AUTO: 0.1 %
BILIRUB SERPL-MCNC: 0.2 MG/DL (ref 0.2–1.3)
BUN SERPL-MCNC: 14 MG/DL (ref 7–30)
CALCIUM SERPL-MCNC: 8.2 MG/DL (ref 8.5–10.1)
CHLORIDE SERPL-SCNC: 107 MMOL/L (ref 94–109)
CO2 SERPL-SCNC: 28 MMOL/L (ref 20–32)
CREAT SERPL-MCNC: 0.7 MG/DL (ref 0.52–1.04)
DIFFERENTIAL METHOD BLD: ABNORMAL
EOSINOPHIL # BLD AUTO: 0 10E9/L (ref 0–0.7)
EOSINOPHIL NFR BLD AUTO: 0 %
ERYTHROCYTE [DISTWIDTH] IN BLOOD BY AUTOMATED COUNT: 22.7 % (ref 10–15)
GFR SERPL CREATININE-BSD FRML MDRD: 86 ML/MIN/1.7M2
GLUCOSE SERPL-MCNC: 161 MG/DL (ref 70–99)
HCT VFR BLD AUTO: 30.1 % (ref 35–47)
HGB BLD-MCNC: 9.1 G/DL (ref 11.7–15.7)
IMM GRANULOCYTES # BLD: 0.1 10E9/L (ref 0–0.4)
IMM GRANULOCYTES NFR BLD: 1 %
LACTATE BLD-SCNC: 2.3 MMOL/L (ref 0.7–2)
LYMPHOCYTES # BLD AUTO: 0.3 10E9/L (ref 0.8–5.3)
LYMPHOCYTES NFR BLD AUTO: 2.5 %
MCH RBC QN AUTO: 27.2 PG (ref 26.5–33)
MCHC RBC AUTO-ENTMCNC: 30.2 G/DL (ref 31.5–36.5)
MCV RBC AUTO: 90 FL (ref 78–100)
MONOCYTES # BLD AUTO: 0.3 10E9/L (ref 0–1.3)
MONOCYTES NFR BLD AUTO: 2.2 %
NEUTROPHILS # BLD AUTO: 12.6 10E9/L (ref 1.6–8.3)
NEUTROPHILS NFR BLD AUTO: 94.2 %
NRBC # BLD AUTO: 0 10*3/UL
NRBC BLD AUTO-RTO: 0 /100
PLATELET # BLD AUTO: 330 10E9/L (ref 150–450)
POTASSIUM SERPL-SCNC: 4 MMOL/L (ref 3.4–5.3)
PROT SERPL-MCNC: 5.8 G/DL (ref 6.8–8.8)
RBC # BLD AUTO: 3.35 10E12/L (ref 3.8–5.2)
SODIUM SERPL-SCNC: 143 MMOL/L (ref 133–144)
WBC # BLD AUTO: 13.4 10E9/L (ref 4–11)

## 2017-11-18 PROCEDURE — 25000132 ZZH RX MED GY IP 250 OP 250 PS 637: Performed by: PHYSICIAN ASSISTANT

## 2017-11-18 PROCEDURE — 71010 XR CHEST PORT 1 VW: CPT

## 2017-11-18 PROCEDURE — 20000002 ZZH R&B BMT INTERMEDIATE

## 2017-11-18 PROCEDURE — 85025 COMPLETE CBC W/AUTO DIFF WBC: CPT | Performed by: PHYSICIAN ASSISTANT

## 2017-11-18 PROCEDURE — 25000125 ZZHC RX 250: Performed by: PHYSICIAN ASSISTANT

## 2017-11-18 PROCEDURE — 25000128 H RX IP 250 OP 636: Performed by: PHYSICIAN ASSISTANT

## 2017-11-18 PROCEDURE — 83605 ASSAY OF LACTIC ACID: CPT | Performed by: INTERNAL MEDICINE

## 2017-11-18 PROCEDURE — S0169 CALCITROL: HCPCS | Performed by: PHYSICIAN ASSISTANT

## 2017-11-18 PROCEDURE — 25000131 ZZH RX MED GY IP 250 OP 636 PS 637: Performed by: PHYSICIAN ASSISTANT

## 2017-11-18 PROCEDURE — 80053 COMPREHEN METABOLIC PANEL: CPT | Performed by: PHYSICIAN ASSISTANT

## 2017-11-18 RX ORDER — FUROSEMIDE 20 MG
20 TABLET ORAL ONCE
Status: COMPLETED | OUTPATIENT
Start: 2017-11-18 | End: 2017-11-18

## 2017-11-18 RX ORDER — SUMATRIPTAN 50 MG/1
50 TABLET, FILM COATED ORAL 2 TIMES DAILY PRN
Status: DISCONTINUED | OUTPATIENT
Start: 2017-11-18 | End: 2017-11-21 | Stop reason: HOSPADM

## 2017-11-18 RX ADMIN — RANITIDINE 75 MG: 75 TABLET, FILM COATED ORAL at 04:55

## 2017-11-18 RX ADMIN — CETIRIZINE HYDROCHLORIDE 10 MG: 10 TABLET, FILM COATED ORAL at 10:58

## 2017-11-18 RX ADMIN — POTASSIUM CHLORIDE 20 MEQ: 750 TABLET, EXTENDED RELEASE ORAL at 04:54

## 2017-11-18 RX ADMIN — RANITIDINE 75 MG: 75 TABLET, FILM COATED ORAL at 10:57

## 2017-11-18 RX ADMIN — FERROUS SULFATE TAB 325 MG (65 MG ELEMENTAL FE) 325 MG: 325 (65 FE) TAB at 04:54

## 2017-11-18 RX ADMIN — Medication 5000 UNITS: at 17:46

## 2017-11-18 RX ADMIN — CALCITRIOL 0.5 MCG: 0.5 CAPSULE, LIQUID FILLED ORAL at 10:56

## 2017-11-18 RX ADMIN — METHOCARBAMOL 750 MG: 750 TABLET ORAL at 15:27

## 2017-11-18 RX ADMIN — Medication 2 TABLET: at 17:43

## 2017-11-18 RX ADMIN — SENNOSIDES AND DOCUSATE SODIUM 2 TABLET: 8.6; 5 TABLET ORAL at 17:46

## 2017-11-18 RX ADMIN — ONDANSETRON HYDROCHLORIDE 16 MG: 8 TABLET, FILM COATED ORAL at 17:42

## 2017-11-18 RX ADMIN — Medication 1 CAPSULE: at 17:45

## 2017-11-18 RX ADMIN — RANITIDINE 75 MG: 75 TABLET, FILM COATED ORAL at 17:42

## 2017-11-18 RX ADMIN — DICLOFENAC SODIUM 2 G: 10 GEL TOPICAL at 05:07

## 2017-11-18 RX ADMIN — CALCITRIOL 0.25 MCG: 0.25 CAPSULE, LIQUID FILLED ORAL at 04:56

## 2017-11-18 RX ADMIN — DOCUSATE SODIUM 100 MG: 100 CAPSULE, LIQUID FILLED ORAL at 04:55

## 2017-11-18 RX ADMIN — FLUCONAZOLE 200 MG: 200 TABLET ORAL at 04:56

## 2017-11-18 RX ADMIN — FUROSEMIDE 20 MG: 20 TABLET ORAL at 10:56

## 2017-11-18 RX ADMIN — OXYCODONE HYDROCHLORIDE 30 MG: 10 TABLET ORAL at 01:36

## 2017-11-18 RX ADMIN — DOXORUBICIN HYDROCHLORIDE 0.8 MG: 2 INJECTION, SOLUTION INTRAVENOUS at 18:35

## 2017-11-18 RX ADMIN — PREDNISONE 60 MG: 50 TABLET ORAL at 20:12

## 2017-11-18 RX ADMIN — ALLOPURINOL 300 MG: 300 TABLET ORAL at 04:55

## 2017-11-18 RX ADMIN — ACYCLOVIR 400 MG: 400 TABLET ORAL at 04:54

## 2017-11-18 RX ADMIN — MONTELUKAST SODIUM 20 MG: 10 TABLET, FILM COATED ORAL at 04:53

## 2017-11-18 RX ADMIN — ETOPOSIDE 100 MG: 20 INJECTION, SOLUTION, CONCENTRATE INTRAVENOUS at 18:28

## 2017-11-18 RX ADMIN — FERROUS SULFATE TAB 325 MG (65 MG ELEMENTAL FE) 325 MG: 325 (65 FE) TAB at 17:43

## 2017-11-18 RX ADMIN — DICLOFENAC SODIUM 2 G: 10 GEL TOPICAL at 17:47

## 2017-11-18 RX ADMIN — ENOXAPARIN SODIUM 40 MG: 40 INJECTION SUBCUTANEOUS at 08:11

## 2017-11-18 RX ADMIN — DIAZEPAM 5 MG: 5 TABLET ORAL at 17:42

## 2017-11-18 RX ADMIN — ONDANSETRON 8 MG: 8 TABLET, ORALLY DISINTEGRATING ORAL at 20:12

## 2017-11-18 RX ADMIN — Medication 2 TABLET: at 10:57

## 2017-11-18 RX ADMIN — FUROSEMIDE 20 MG: 20 TABLET ORAL at 04:54

## 2017-11-18 RX ADMIN — HYDROCHLOROTHIAZIDE 12.5 MG: 12.5 CAPSULE ORAL at 10:56

## 2017-11-18 RX ADMIN — BISACODYL 5 MG: 5 TABLET, COATED ORAL at 17:47

## 2017-11-18 RX ADMIN — ACYCLOVIR 400 MG: 400 TABLET ORAL at 17:43

## 2017-11-18 RX ADMIN — CETIRIZINE HYDROCHLORIDE 10 MG: 10 TABLET, FILM COATED ORAL at 17:42

## 2017-11-18 RX ADMIN — CELECOXIB 200 MG: 100 CAPSULE ORAL at 04:53

## 2017-11-18 RX ADMIN — PREDNISONE 60 MG: 50 TABLET ORAL at 08:11

## 2017-11-18 RX ADMIN — FERROUS SULFATE TAB 325 MG (65 MG ELEMENTAL FE) 325 MG: 325 (65 FE) TAB at 10:57

## 2017-11-18 RX ADMIN — POLYETHYLENE GLYCOL 3350 17 G: 17 POWDER, FOR SOLUTION ORAL at 04:58

## 2017-11-18 RX ADMIN — MORPHINE SULFATE 30 MG: 15 TABLET, EXTENDED RELEASE ORAL at 04:53

## 2017-11-18 RX ADMIN — CELECOXIB 200 MG: 100 CAPSULE ORAL at 17:44

## 2017-11-18 RX ADMIN — CETIRIZINE HYDROCHLORIDE 10 MG: 10 TABLET, FILM COATED ORAL at 04:53

## 2017-11-18 RX ADMIN — METHOCARBAMOL 750 MG: 750 TABLET ORAL at 17:43

## 2017-11-18 RX ADMIN — SODIUM CHLORIDE, PRESERVATIVE FREE 5 ML: 5 INJECTION INTRAVENOUS at 18:25

## 2017-11-18 RX ADMIN — CROMOLYN SODIUM 1 DROP: 40 SOLUTION/ DROPS OPHTHALMIC at 10:59

## 2017-11-18 RX ADMIN — LIDOCAINE HYDROCHLORIDE: 20 JELLY TOPICAL at 05:08

## 2017-11-18 RX ADMIN — CROMOLYN SODIUM 1 DROP: 40 SOLUTION/ DROPS OPHTHALMIC at 17:47

## 2017-11-18 RX ADMIN — OXYCODONE HYDROCHLORIDE 30 MG: 10 TABLET ORAL at 10:59

## 2017-11-18 RX ADMIN — LEVOTHYROXINE SODIUM 224 MCG: 112 TABLET ORAL at 05:04

## 2017-11-18 RX ADMIN — DICLOFENAC SODIUM 2 G: 10 GEL TOPICAL at 15:27

## 2017-11-18 RX ADMIN — Medication 2 TABLET: at 05:03

## 2017-11-18 RX ADMIN — OXYCODONE HYDROCHLORIDE 30 MG: 10 TABLET ORAL at 20:12

## 2017-11-18 RX ADMIN — MORPHINE SULFATE 30 MG: 15 TABLET, EXTENDED RELEASE ORAL at 10:57

## 2017-11-18 RX ADMIN — MONTELUKAST SODIUM 20 MG: 10 TABLET, FILM COATED ORAL at 17:42

## 2017-11-18 RX ADMIN — DICLOFENAC SODIUM 2 G: 10 GEL TOPICAL at 10:59

## 2017-11-18 RX ADMIN — CROMOLYN SODIUM 1 DROP: 40 SOLUTION/ DROPS OPHTHALMIC at 15:27

## 2017-11-18 RX ADMIN — DIAZEPAM 5 MG: 5 TABLET ORAL at 04:56

## 2017-11-18 RX ADMIN — METHOCARBAMOL 750 MG: 750 TABLET ORAL at 04:55

## 2017-11-18 RX ADMIN — DIAZEPAM 5 MG: 5 TABLET ORAL at 10:58

## 2017-11-18 RX ADMIN — MORPHINE SULFATE 60 MG: 60 TABLET, EXTENDED RELEASE ORAL at 20:12

## 2017-11-18 RX ADMIN — CROMOLYN SODIUM 1 DROP: 40 SOLUTION/ DROPS OPHTHALMIC at 05:06

## 2017-11-18 RX ADMIN — OXYCODONE HYDROCHLORIDE 30 MG: 10 TABLET ORAL at 05:37

## 2017-11-18 RX ADMIN — METHOCARBAMOL 750 MG: 750 TABLET ORAL at 10:57

## 2017-11-18 ASSESSMENT — PAIN DESCRIPTION - DESCRIPTORS
DESCRIPTORS: ACHING;DISCOMFORT
DESCRIPTORS: ACHING

## 2017-11-18 NOTE — PLAN OF CARE
Problem: Chemotherapy Effects (Adult)  Goal: Signs and Symptoms of Listed Potential Problems Will be Absent, Minimized or Managed (Chemotherapy Effects)  Signs and symptoms of listed potential problems will be absent, minimized or managed by discharge/transition of care (reference Chemotherapy Effects (Adult) CPG).   Outcome: No Change  Patient is A & O x 4. Afebrile, VSS. Chemo infusing no nausea reported overnight.  Port cath is patent and has good blood returns.   Patient received prn oxycodone 30 mg po x 3. Patient is eating and drinking well. Pt up independently. Voiding good amounts. Morning labs: Hbg 9.1, Platelets 330, K 4.0, No replacements needed. Will continue to monitor per plan of care.

## 2017-11-18 NOTE — PROGRESS NOTES
"[facility], [place]    Hematology / Oncology Progress Note    Date of Service (when I saw the patient): 11/18/2017     Assessment & Plan   Tisha Arias is a 57 year old female with high grade DLBCL and PMHx significant for breast cancer s/p bilateral mastectomies & BENJIE/BSO, papillary thyroid cancer s/p total thyroidectomy, chronic chest wall pain, muscle spasms, asthma and h/o Rachael en Y gastric bypass, who presents for Cycle 3 of DA-R-EPOCH.      HEME  # High grade B cell lymphoma. \"Double hit-like\". Primary is Dr. Sauceda. Patient diagnosed August 2017. Lytic lesion of right 10th rib with extension. Disease localized above the diaphragm in thoracic and paravertebral soft tissue and mediastinal lymphadenopathy. 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 Staging LP and BMBx were negative for lymphoma. Initiated cycle 1 DA-R-EPOCH on 10/6/17. S/p cycle 2 10/27/17. She now presents for cycle 3 DA-R-EPOCH.  -Port-a-cath in place. Some increased Port pain with right sided upper extremity movement. Will obtain CXR for placement verification.   -Labs ok to begin therapy      Treatment plan: Cycle 3 DA-R-EPOCH (Day 1=11/16/17).  Today is Day 3.  -Rituxan 750 mg (Day 1)  -Prednisone 60 mg  (Day 1-5)  -Etoposide 100 mg CIVI (Day 1-4)  -Vincristine 0.8 mg CIVI (Day 1-4)  -Cyclophosphamide 1500 mg (Day 5)  -LP with IT chemo completed Friday 11/17.  -Premeds: Tylenol and benadryl prior to Rituxan, Zofran (D1-5), Emend (d5), Dex (D6-7).  -Neulasta on discharge (scheduled for 11/22).  -Discharge with D6-7 Dexamethasone      #Anemia. 2/2 disease and chemotherapy.  -Transfuse for Hgb <8 and plts <10K.      #Stage 1, ER/CA-positive, HER2-negative breast cancer. Follows with Dr. Crawley. Diagnosed in 2011. Oncotype recurrence score of 11 (7% recurrence risk after 5 years " of tamoxifen). S/p bilateral mastectomies and BENJIE/BSO in 2012. For endocrine therapy she was on Arimidex from 3476-2573, then changed to Tamoxifen b/c of arthralgias on Arimidex. Has been on Tamoxifen since 2014 now held while being treated for lymphoma. Last dose 10/5.   -F/u with Dr. Crawley 11/27/17.      # Papillary thyroid cancer. Follows with Dr. Castro. Diagnosed in 2013. S/p total thyroidectomy and CND. Received ETOH treatment August 2014, October 2014 and December 2014. Has post-surgical hypothyroidism.  -Continue PTA Levothyroxine.  -Continue PTA calcitriol.    NEURO  # Headaches. Pt states she has been having increasing migraine headaches, about daily, for the past two weeks. No vision or hearing changes. Located in occipital region and radiates around head. Tenderness on palpation to neck muscles. Suspect these could be tension headaches related to muscle tightness.  -Continue muscle relaxants and tylenol for pain control  -Will have LP today, though very unlikely to have meningitis  -Hold off on brain imaging      GI  # H/o Rachael en Y gastric bypass. Likely affecting absorption, thus patient is on multiple supplements.  -Continue Calcium and Magnesium replacements.      Pain  # Chronic chest wall pain after mastectomy.   -Continue MS Contin 30 mg morning and afternoon and 60 mg at night.  -Oxycodone 30 mg q4hrs prn.  -Celebrex 200 BID.   -Lidocaine cream prn.      #Chronic muscle cramps.   -Continue KCl 20 mEq daily, Robaxin 750 mg QID, Valium 5 mg TID prn, Flexeril prn.      ID  #Anti-infectives.  -Continue PTA acyclovir and fluconazole.  -Will hold Levaquin ppx as patient is not neutropenic. To resume on discharge or when ANC <1000.      MISC  #Lymphedema. Of LE. Improved per patient. Wearing compression stockings.   -Continue PTA HCTZ and Lasix. Extra Lasix 20 mg this afternoon 2/2 fluid retention and weight gain.   -Monitor BMP daily      #Asthma, allergies.   -Continue PTA singular,  zyrtec.  -Patient states she is not taking her inhalers. Requests only albuterol prn rescue.      FEN  -IVF per chemo regimen  -PTA electrolyte replacements and lyte protocol  -RDAT      PPx  -VTE: Lovenox ppx  -PUD: PTA ranitidine  -Bowels: PTA docusate and miralax       Dispo: Pending completion of chemotherapy and resolution of any acute toxicities, likely 4-5 day stay (~11/20 or 11/21).      Above plan discussed with staff physician, JAMESON CasasC  Hematology/Oncology  Pager: [phone]    Interval History   Doing well this morning. Did have an occipital and temporal headache overnight. No vision or hearing changes. Has sharp stabbing pain around her Port site that happened after her LP and then resolved. Repeat occurrence this morning when putting her sweater on. Feels a bit puffy from the fluids. Is hoping to speed the rate up over 20 hours. Breathing is stable.    Physical Exam   Temp: 98.3  F (36.8  C) Temp src: Axillary BP: 111/65 Pulse: 89 Heart Rate: 86 Resp: 18 SpO2: 97 % O2 Device: None (Room air)    Vitals:    11/16/17 1400 11/17/17 0839 11/18/17 0700   Weight: 84.8 kg (186 lb 15.2 oz) 84.5 kg (186 lb 3.2 oz) 85.8 kg (189 lb 1.6 oz)     Vital Signs with Ranges  Temp:  [97.4  F (36.3  C)-99.1  F (37.3  C)] 98.3  F (36.8  C)  Pulse:  [81-89] 89  Heart Rate:  [85-97] 86  Resp:  [16-18] 18  BP: (105-124)/(54-82) 111/65  SpO2:  [94 %-98 %] 97 %  I/O last 3 completed shifts:  In: 4209.1 [P.O.:2840; IV Piggyback:1369.1]  Out: 2600 [Urine:2600]    Constitutional: Pleasant female seen sitting in bed. No apparent distress, and appears stated age.  Eyes: Lids and lashes normal, sclera clear, conjunctiva normal.  ENT: Normocephalic, oral pharynx with moist mucus membranes, tonsils without erythema or exudates, gums normal and good dentition.   Respiratory: No increased work of breathing, good air exchange, clear to auscultation bilaterally, no crackles or  wheezing.  Cardiovascular: Regular rate and rhythm, normal S1 and S2, and no murmur noted.  GI: No masses or scars. +BS. Soft. Tenderness on palpation to LUQ.  Skin: No bruising or bleeding, no redness, warmth, or swelling, no rashes, no lesions, no jaundice.  Extremities: There is no redness, warmth, or swelling of the joints. +1 lower extremity edema, compression stockings in place. No cyanosis.  Neurologic: Awake, alert, oriented to name, place and time.    Vascular access: Port, c/d/i    Medications     - MEDICATION INSTRUCTIONS -       - MEDICATION INSTRUCTIONS -       NaCl         furosemide  20 mg Oral Once     enoxaparin  40 mg Subcutaneous Q24H     furosemide  20 mg Oral BID     PROBIOTIC DAILY  1 capsule Oral QPM     Digestzymes 2 capsules  2 capsule Oral or Feeding Tube TID     UNABLE TO FIND 1 tablet  1 tablet Oral Daily     Calcium D-Glucarate 3 tablets  3 capsule Oral TID     Muscle Mag 2 caps  2 capsule Oral TID     ondansetron  16 mg Oral Q24H     [START ON 11/20/2017] fosaprepitant (EMEND) 150 mg intermittent infusion  150 mg Intravenous Once     [START ON 11/22/2017] dexamethasone  8 mg Oral Daily     predniSONE  30 mg/m2 (Treatment Plan Recorded) Oral BID     Chemotherapy Infusing-Continuous Infusion   Does not apply Q8H     etoposide (TOPOSAR) chemo infusion  50 mg/m2 (Treatment Plan Recorded) Intravenous Q24H     vinCRIStine/DOXOrubicin (ONCOVIN/ADRIAMYCIN) chemo infusion  0.4 mg/m2 (Treatment Plan Recorded) Intravenous Q24H     [START ON 11/20/2017] cyclophosphamide (CYTOXAN) chemo infusion  1,500 mg Intravenous Once     [START ON 11/21/2017] filgrastim (NEUPOGEN/GRANIX) intravenous  5 mcg/kg (Treatment Plan Recorded) Intravenous Daily at 8 pm     acyclovir  400 mg Oral BID     allopurinol  300 mg Oral Daily     calcium citrate-vitamin D  2 tablet Oral TID     celecoxib  200 mg Oral BID     cetirizine  10 mg Oral TID     diazepam  5 mg Oral TID     ferrous sulfate  325 mg Oral TID w/meals      fluconazole  200 mg Oral Daily     levothyroxine  224 mcg Oral Once per day on Mon Tue Wed Thu Fri Sat     methocarbamol  750 mg Oral 4x Daily     montelukast  20 mg Oral BID     morphine  30 mg Oral BID     morphine (MS CONTIN) 12 hr tablet 60 mg  60 mg Oral Daily     potassium chloride SA  20 mEq Oral Daily     ranitidine  75 mg Oral TID     senna-docusate  2 tablet Oral TID     docusate sodium  100 mg Oral TID     hydrochlorothiazide  12.5 mg Oral Daily     polyethylene glycol  17 g Oral TID     cholecalciferol  5,000 Units Oral Daily     bisacodyl  5 mg Oral BID     cromolyn  1 drop Both Eyes 4x Daily     diclofenac  2 g Topical 4x Daily     [START ON 11/19/2017] levothyroxine  336 mcg Oral Weekly     calcitRIOL  0.25 mcg Oral QAM     calcitRIOL  0.5 mcg Oral Daily with lunch     heparin lock flush  5-10 mL Intracatheter Q24H     heparin  5 mL Intracatheter Q28 Days       Data   ROUTINE IP LABS (Last four results)  BMP    Recent Labs  Lab 11/18/17 0448 11/17/17  0405 11/16/17  0658 11/13/17  1647    142 141 143   POTASSIUM 4.0 4.0 3.8 3.8   CHLORIDE 107 107 104 107   ELMO 8.2* 8.3* 8.4* 8.1*   CO2 28 28 28 28   BUN 14 12 14 13   CR 0.70 0.70 0.80 0.99   * 168* 143* 111*     CBC    Recent Labs  Lab 11/18/17 0448 11/17/17  0405 11/16/17  0658 11/13/17  1647   WBC 13.4* 8.0 6.1 8.5   RBC 3.35* 3.47* 3.62* 3.64*   HGB 9.1* 9.4* 9.9* 9.9*   HCT 30.1* 30.8* 31.7* 32.2*   MCV 90 89 88 89   MCH 27.2 27.1 27.3 27.2   MCHC 30.2* 30.5* 31.2* 30.7*   RDW 22.7* 21.9* 21.5* 21.3*    303 345 314     INRNo lab results found in last 7 days.

## 2017-11-18 NOTE — PLAN OF CARE
Problem: Patient Care Overview  Goal: Plan of Care/Patient Progress Review  Outcome: No Change   11/18/17 1628   OTHER   Plan Of Care Reviewed With patient     Goal: Individualization & Mutuality  Outcome: No Change  Patient is A & O x 4. Afebrile, vital signs stable and lung sounds diminished on the base. Port cath is patent, good blood returns and continues of chemotherapies infusing. Patient continue to c/o chest wall discomfort/muscle tender pain and receives oxycodone prn with relief. Patient is voiding good, urine is yellow, clear and has bowel movements x 2. Patient c/o mild fatigue and walks on the hallway. Continue with plan of care and notify MD for status changes.    Problem: Chemotherapy Effects (Adult)  Goal: Signs and Symptoms of Listed Potential Problems Will be Absent, Minimized or Managed (Chemotherapy Effects)  Signs and symptoms of listed potential problems will be absent, minimized or managed by discharge/transition of care (reference Chemotherapy Effects (Adult) CPG).   Outcome: No Change   11/18/17 1628   Chemotherapy Effects   Problems Assessed (Chemotherapy Effects) all   Problems Present (Chemotherapy Effects) fatigue

## 2017-11-18 NOTE — PLAN OF CARE
Problem: Patient Care Overview  Goal: Plan of Care/Patient Progress Review  Outcome: No Change   11/17/17 1907   OTHER   Plan Of Care Reviewed With patient     Goal: Individualization & Mutuality  Outcome: No Change  Patient is A & O x 4. Afebrile, vital signs stable and lung sounds diminished on the base. Port cath is patent and has good blood returns. At 1800 patient c/o port cath started to have deep achy pain and MD were notified. Port cath is patent, hot packs with some relief. Patient continue to have the chemo infusing until 8 pm. Patient received prn oxycodone 30 mg po x 2, hot packs , lid gel with some relief. Patient had LP today, the site is C/D/I, patient c/o mild sore/aching. Patient is eating and drinking well. Patient walks on the hallways in multiple times and goes outside. Continue to encourage patient to do good mouth cares, cough, deep breath and sit up while eating or drinking. Continue with plan of care and notify MD for status changes.

## 2017-11-19 LAB
ALBUMIN SERPL-MCNC: 2.7 G/DL (ref 3.4–5)
ALP SERPL-CCNC: 79 U/L (ref 40–150)
ALT SERPL W P-5'-P-CCNC: 23 U/L (ref 0–50)
ANION GAP SERPL CALCULATED.3IONS-SCNC: 9 MMOL/L (ref 3–14)
AST SERPL W P-5'-P-CCNC: 15 U/L (ref 0–45)
BASOPHILS # BLD AUTO: 0 10E9/L (ref 0–0.2)
BASOPHILS NFR BLD AUTO: 0.1 %
BILIRUB SERPL-MCNC: 0.2 MG/DL (ref 0.2–1.3)
BUN SERPL-MCNC: 20 MG/DL (ref 7–30)
CALCIUM SERPL-MCNC: 8.1 MG/DL (ref 8.5–10.1)
CHLORIDE SERPL-SCNC: 104 MMOL/L (ref 94–109)
CO2 SERPL-SCNC: 30 MMOL/L (ref 20–32)
CREAT SERPL-MCNC: 0.74 MG/DL (ref 0.52–1.04)
DIFFERENTIAL METHOD BLD: ABNORMAL
EOSINOPHIL # BLD AUTO: 0 10E9/L (ref 0–0.7)
EOSINOPHIL NFR BLD AUTO: 0 %
ERYTHROCYTE [DISTWIDTH] IN BLOOD BY AUTOMATED COUNT: 22.3 % (ref 10–15)
GFR SERPL CREATININE-BSD FRML MDRD: 81 ML/MIN/1.7M2
GLUCOSE SERPL-MCNC: 209 MG/DL (ref 70–99)
HCT VFR BLD AUTO: 31.4 % (ref 35–47)
HGB BLD-MCNC: 9.7 G/DL (ref 11.7–15.7)
IMM GRANULOCYTES # BLD: 0 10E9/L (ref 0–0.4)
IMM GRANULOCYTES NFR BLD: 0.4 %
LYMPHOCYTES # BLD AUTO: 0.2 10E9/L (ref 0.8–5.3)
LYMPHOCYTES NFR BLD AUTO: 2.2 %
MCH RBC QN AUTO: 27.3 PG (ref 26.5–33)
MCHC RBC AUTO-ENTMCNC: 30.9 G/DL (ref 31.5–36.5)
MCV RBC AUTO: 89 FL (ref 78–100)
MONOCYTES # BLD AUTO: 0.2 10E9/L (ref 0–1.3)
MONOCYTES NFR BLD AUTO: 2.3 %
NEUTROPHILS # BLD AUTO: 9.5 10E9/L (ref 1.6–8.3)
NEUTROPHILS NFR BLD AUTO: 95 %
NRBC # BLD AUTO: 0 10*3/UL
NRBC BLD AUTO-RTO: 0 /100
PLATELET # BLD AUTO: 383 10E9/L (ref 150–450)
POTASSIUM SERPL-SCNC: 3.5 MMOL/L (ref 3.4–5.3)
PROT SERPL-MCNC: 6.3 G/DL (ref 6.8–8.8)
RBC # BLD AUTO: 3.55 10E12/L (ref 3.8–5.2)
SODIUM SERPL-SCNC: 143 MMOL/L (ref 133–144)
WBC # BLD AUTO: 10 10E9/L (ref 4–11)

## 2017-11-19 PROCEDURE — 25000125 ZZHC RX 250: Performed by: PHYSICIAN ASSISTANT

## 2017-11-19 PROCEDURE — 25000131 ZZH RX MED GY IP 250 OP 636 PS 637: Performed by: PHYSICIAN ASSISTANT

## 2017-11-19 PROCEDURE — 25000128 H RX IP 250 OP 636: Performed by: PHYSICIAN ASSISTANT

## 2017-11-19 PROCEDURE — 80053 COMPREHEN METABOLIC PANEL: CPT | Performed by: PHYSICIAN ASSISTANT

## 2017-11-19 PROCEDURE — 25000132 ZZH RX MED GY IP 250 OP 250 PS 637: Performed by: PHYSICIAN ASSISTANT

## 2017-11-19 PROCEDURE — 85025 COMPLETE CBC W/AUTO DIFF WBC: CPT | Performed by: PHYSICIAN ASSISTANT

## 2017-11-19 PROCEDURE — S0169 CALCITROL: HCPCS | Performed by: PHYSICIAN ASSISTANT

## 2017-11-19 PROCEDURE — 20000002 ZZH R&B BMT INTERMEDIATE

## 2017-11-19 RX ORDER — SALIVA STIMULANT COMB. NO.3
2 SPRAY, NON-AEROSOL (ML) MUCOUS MEMBRANE 4 TIMES DAILY PRN
Status: DISCONTINUED | OUTPATIENT
Start: 2017-11-19 | End: 2017-11-21 | Stop reason: HOSPADM

## 2017-11-19 RX ORDER — FUROSEMIDE 20 MG
20 TABLET ORAL ONCE
Status: COMPLETED | OUTPATIENT
Start: 2017-11-19 | End: 2017-11-19

## 2017-11-19 RX ADMIN — Medication 5000 UNITS: at 18:09

## 2017-11-19 RX ADMIN — OXYCODONE HYDROCHLORIDE 30 MG: 10 TABLET ORAL at 19:55

## 2017-11-19 RX ADMIN — ACYCLOVIR 400 MG: 400 TABLET ORAL at 18:06

## 2017-11-19 RX ADMIN — Medication 2 TABLET: at 11:08

## 2017-11-19 RX ADMIN — FUROSEMIDE 20 MG: 20 TABLET ORAL at 11:07

## 2017-11-19 RX ADMIN — OXYCODONE HYDROCHLORIDE 30 MG: 10 TABLET ORAL at 00:23

## 2017-11-19 RX ADMIN — LIDOCAINE HYDROCHLORIDE: 20 JELLY TOPICAL at 00:23

## 2017-11-19 RX ADMIN — CETIRIZINE HYDROCHLORIDE 10 MG: 10 TABLET, FILM COATED ORAL at 04:43

## 2017-11-19 RX ADMIN — DIAZEPAM 5 MG: 5 TABLET ORAL at 18:07

## 2017-11-19 RX ADMIN — POTASSIUM CHLORIDE 20 MEQ: 750 TABLET, EXTENDED RELEASE ORAL at 04:44

## 2017-11-19 RX ADMIN — DIAZEPAM 5 MG: 5 TABLET ORAL at 11:14

## 2017-11-19 RX ADMIN — Medication 2 TABLET: at 04:41

## 2017-11-19 RX ADMIN — BISACODYL 5 MG: 5 TABLET, COATED ORAL at 04:45

## 2017-11-19 RX ADMIN — DICLOFENAC SODIUM 2 G: 10 GEL TOPICAL at 16:45

## 2017-11-19 RX ADMIN — DOCUSATE SODIUM 100 MG: 100 CAPSULE, LIQUID FILLED ORAL at 11:08

## 2017-11-19 RX ADMIN — Medication 2 TABLET: at 18:08

## 2017-11-19 RX ADMIN — METHOCARBAMOL 750 MG: 750 TABLET ORAL at 16:44

## 2017-11-19 RX ADMIN — Medication 2 SPRAY: at 11:09

## 2017-11-19 RX ADMIN — MORPHINE SULFATE 30 MG: 15 TABLET, EXTENDED RELEASE ORAL at 04:41

## 2017-11-19 RX ADMIN — DOXORUBICIN HYDROCHLORIDE 0.8 MG: 2 INJECTION, SOLUTION INTRAVENOUS at 16:22

## 2017-11-19 RX ADMIN — MORPHINE SULFATE 60 MG: 60 TABLET, EXTENDED RELEASE ORAL at 19:55

## 2017-11-19 RX ADMIN — FUROSEMIDE 20 MG: 20 TABLET ORAL at 04:43

## 2017-11-19 RX ADMIN — RANITIDINE 75 MG: 75 TABLET, FILM COATED ORAL at 11:06

## 2017-11-19 RX ADMIN — FERROUS SULFATE TAB 325 MG (65 MG ELEMENTAL FE) 325 MG: 325 (65 FE) TAB at 18:07

## 2017-11-19 RX ADMIN — ETOPOSIDE 100 MG: 20 INJECTION, SOLUTION, CONCENTRATE INTRAVENOUS at 16:21

## 2017-11-19 RX ADMIN — CELECOXIB 200 MG: 100 CAPSULE ORAL at 18:07

## 2017-11-19 RX ADMIN — MONTELUKAST SODIUM 20 MG: 10 TABLET, FILM COATED ORAL at 18:06

## 2017-11-19 RX ADMIN — CROMOLYN SODIUM 1 DROP: 40 SOLUTION/ DROPS OPHTHALMIC at 11:09

## 2017-11-19 RX ADMIN — POLYETHYLENE GLYCOL 3350 17 G: 17 POWDER, FOR SOLUTION ORAL at 18:08

## 2017-11-19 RX ADMIN — DIAZEPAM 5 MG: 5 TABLET ORAL at 04:43

## 2017-11-19 RX ADMIN — DICLOFENAC SODIUM 2 G: 10 GEL TOPICAL at 04:48

## 2017-11-19 RX ADMIN — CALCITRIOL 0.5 MCG: 0.5 CAPSULE, LIQUID FILLED ORAL at 11:07

## 2017-11-19 RX ADMIN — CALCITRIOL 0.25 MCG: 0.25 CAPSULE, LIQUID FILLED ORAL at 04:43

## 2017-11-19 RX ADMIN — MORPHINE SULFATE 30 MG: 15 TABLET, EXTENDED RELEASE ORAL at 11:07

## 2017-11-19 RX ADMIN — LEVOTHYROXINE SODIUM 336 MCG: 112 TABLET ORAL at 04:50

## 2017-11-19 RX ADMIN — METHOCARBAMOL 750 MG: 750 TABLET ORAL at 11:07

## 2017-11-19 RX ADMIN — LIDOCAINE HYDROCHLORIDE: 20 JELLY TOPICAL at 11:09

## 2017-11-19 RX ADMIN — FUROSEMIDE 20 MG: 20 TABLET ORAL at 11:08

## 2017-11-19 RX ADMIN — CROMOLYN SODIUM 1 DROP: 40 SOLUTION/ DROPS OPHTHALMIC at 04:48

## 2017-11-19 RX ADMIN — CROMOLYN SODIUM 1 DROP: 40 SOLUTION/ DROPS OPHTHALMIC at 16:45

## 2017-11-19 RX ADMIN — FERROUS SULFATE TAB 325 MG (65 MG ELEMENTAL FE) 325 MG: 325 (65 FE) TAB at 04:43

## 2017-11-19 RX ADMIN — PREDNISONE 60 MG: 50 TABLET ORAL at 19:55

## 2017-11-19 RX ADMIN — POLYETHYLENE GLYCOL 3350 17 G: 17 POWDER, FOR SOLUTION ORAL at 04:40

## 2017-11-19 RX ADMIN — MONTELUKAST SODIUM 20 MG: 10 TABLET, FILM COATED ORAL at 04:43

## 2017-11-19 RX ADMIN — CETIRIZINE HYDROCHLORIDE 10 MG: 10 TABLET, FILM COATED ORAL at 18:06

## 2017-11-19 RX ADMIN — RANITIDINE 75 MG: 75 TABLET, FILM COATED ORAL at 18:15

## 2017-11-19 RX ADMIN — RANITIDINE 75 MG: 75 TABLET, FILM COATED ORAL at 04:44

## 2017-11-19 RX ADMIN — CROMOLYN SODIUM 1 DROP: 40 SOLUTION/ DROPS OPHTHALMIC at 18:08

## 2017-11-19 RX ADMIN — METHOCARBAMOL 750 MG: 750 TABLET ORAL at 18:06

## 2017-11-19 RX ADMIN — SENNOSIDES AND DOCUSATE SODIUM 2 TABLET: 8.6; 5 TABLET ORAL at 18:07

## 2017-11-19 RX ADMIN — FLUCONAZOLE 200 MG: 200 TABLET ORAL at 04:43

## 2017-11-19 RX ADMIN — ENOXAPARIN SODIUM 40 MG: 40 INJECTION SUBCUTANEOUS at 08:12

## 2017-11-19 RX ADMIN — ACYCLOVIR 400 MG: 400 TABLET ORAL at 04:43

## 2017-11-19 RX ADMIN — ALLOPURINOL 300 MG: 300 TABLET ORAL at 04:43

## 2017-11-19 RX ADMIN — OXYCODONE HYDROCHLORIDE 30 MG: 10 TABLET ORAL at 08:25

## 2017-11-19 RX ADMIN — OXYCODONE HYDROCHLORIDE 30 MG: 10 TABLET ORAL at 14:16

## 2017-11-19 RX ADMIN — FERROUS SULFATE TAB 325 MG (65 MG ELEMENTAL FE) 325 MG: 325 (65 FE) TAB at 11:07

## 2017-11-19 RX ADMIN — SENNOSIDES AND DOCUSATE SODIUM 2 TABLET: 8.6; 5 TABLET ORAL at 11:09

## 2017-11-19 RX ADMIN — Medication 1 CAPSULE: at 18:09

## 2017-11-19 RX ADMIN — DICLOFENAC SODIUM 2 G: 10 GEL TOPICAL at 11:09

## 2017-11-19 RX ADMIN — CETIRIZINE HYDROCHLORIDE 10 MG: 10 TABLET, FILM COATED ORAL at 11:09

## 2017-11-19 RX ADMIN — HYDROCHLOROTHIAZIDE 12.5 MG: 12.5 CAPSULE ORAL at 11:06

## 2017-11-19 RX ADMIN — DOCUSATE SODIUM 100 MG: 100 CAPSULE, LIQUID FILLED ORAL at 04:42

## 2017-11-19 RX ADMIN — METHOCARBAMOL 750 MG: 750 TABLET ORAL at 04:44

## 2017-11-19 RX ADMIN — PROCHLORPERAZINE EDISYLATE 10 MG: 5 INJECTION INTRAMUSCULAR; INTRAVENOUS at 19:54

## 2017-11-19 RX ADMIN — BISACODYL 5 MG: 5 TABLET, COATED ORAL at 18:07

## 2017-11-19 RX ADMIN — CELECOXIB 200 MG: 100 CAPSULE ORAL at 04:40

## 2017-11-19 RX ADMIN — OXYCODONE HYDROCHLORIDE 30 MG: 10 TABLET ORAL at 04:41

## 2017-11-19 RX ADMIN — PREDNISONE 60 MG: 50 TABLET ORAL at 08:12

## 2017-11-19 RX ADMIN — ONDANSETRON HYDROCHLORIDE 16 MG: 8 TABLET, FILM COATED ORAL at 14:16

## 2017-11-19 ASSESSMENT — PAIN DESCRIPTION - DESCRIPTORS
DESCRIPTORS: ACHING;DISCOMFORT
DESCRIPTORS: ACHING;CONSTANT
DESCRIPTORS: ACHING;DISCOMFORT
DESCRIPTORS: ACHING;DISCOMFORT

## 2017-11-19 NOTE — PLAN OF CARE
Problem: Patient Care Overview  Goal: Plan of Care/Patient Progress Review  Outcome: No Change   11/19/17 5504   OTHER   Plan Of Care Reviewed With patient   Plan of Care Review   Progress no change     Goal: Individualization & Mutuality  Outcome: No Change  Patient is A & O x 4. Afebrile, vital signs stable and lung sounds clear. Port cath is patent, good blood return. Patient continue to c/o lower back pain and received oxycodone 30 mg po x 2 with some relief. Patient is eating and drinking well. Patient walks on the hallways and no new events. Patient has continue chemo infusing for 20 hours. Patient have cytoxan tomorrow and possible discharge to home. Continue with plan of care and notify MD for status changes.

## 2017-11-19 NOTE — PROGRESS NOTES
[facility] [place]    Sepsis Evaluation Progress Note    Date of Service: 11/18/2017    I was called to see Tisha Arias due to abnormal vital signs triggering the Sepsis SIRS screening alert. She is not known to have an infection.     Physical Exam    Vital Signs:  Temp: 98.6  F (37  C) Temp src: Oral BP: 134/67 Pulse: 89 Heart Rate: 112 Resp: 18 SpO2: 96 % O2 Device: None (Room air)      Lab:  Lactic Acid   Date Value Ref Range Status   11/18/2017 2.3 (H) 0.7 - 2.0 mmol/L Final       The patient is at baseline mental status.    The rest of their physical exam is significant for afebrile, normotensive, no active symptoms other than some nausea.    Assessment and Plan    The SIRS and exam findings are likely due to lymphoma, there is no sign of sepsis at this time.    Disposition: The patient will remain on the current unit. We will continue to monitor this patient closely.    Stevie Tian MD

## 2017-11-19 NOTE — PROGRESS NOTES
"[facility], [place]    Hematology / Oncology Progress Note    Date of Service (when I saw the patient): 11/19/2017     Assessment & Plan   Tisha Arias is a 57 year old female with high grade DLBCL and PMHx significant for breast cancer s/p bilateral mastectomies & BENJIE/BSO, papillary thyroid cancer s/p total thyroidectomy, chronic chest wall pain, muscle spasms, asthma and h/o Rachael en Y gastric bypass, who presents for Cycle 3 of DA-R-EPOCH.      HEME  # High grade B cell lymphoma. \"Double hit-like\". Primary is Dr. Sauceda. Patient diagnosed August 2017. Lytic lesion of right 10th rib with extension. Disease localized above the diaphragm in thoracic and paravertebral soft tissue and mediastinal lymphadenopathy. 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 Staging LP and BMBx were negative for lymphoma. Initiated cycle 1 DA-R-EPOCH on 10/6/17. S/p cycle 2 10/27/17. She now presents for cycle 3 DA-R-EPOCH.  -Port-a-cath in place. Some increased Port pain with right sided upper extremity movement. CXR with no abnormalities, suspect pain was positional in nature.   -Labs ok to begin therapy      Treatment plan: Cycle 3 DA-R-EPOCH (Day 1=11/16/17).  Today is Day 4.  -Rituxan 750 mg (Day 1)  -Prednisone 60 mg  (Day 1-5)  -Etoposide 100 mg CIVI (Day 1-4)  -Vincristine 0.8 mg CIVI (Day 1-4)  -Cyclophosphamide 1500 mg (Day 5)  -LP with IT chemo completed Friday 11/17.  -Premeds: Tylenol and benadryl prior to Rituxan, Zofran (D1-5), Emend (d5), Dex (D6-7).  -Neulasta on discharge (scheduled for 11/22).  -Discharge with D6-7 Dexamethasone      #Anemia. 2/2 disease and chemotherapy.  -Transfuse for Hgb <8 and plts <10K.      #Stage 1, ER/IA-positive, HER2-negative breast cancer. Follows with Dr. Crawley. Diagnosed in 2011. Oncotype recurrence score of 11 (7% " recurrence risk after 5 years of tamoxifen). S/p bilateral mastectomies and BENJIE/BSO in 2012. For endocrine therapy she was on Arimidex from 7240-3933, then changed to Tamoxifen b/c of arthralgias on Arimidex. Has been on Tamoxifen since 2014 now held while being treated for lymphoma. Last dose 10/5.   -F/u with Dr. Crawley 11/27/17.      # Papillary thyroid cancer. Follows with Dr. Castro. Diagnosed in 2013. S/p total thyroidectomy and CND. Received ETOH treatment August 2014, October 2014 and December 2014. Has post-surgical hypothyroidism.  -Continue PTA Levothyroxine.  -Continue PTA calcitriol.    NEURO  # Headaches. Pt states she has been having increasing migraine headaches, about daily, for the past two weeks. No vision or hearing changes. Located in occipital region and radiates around head. Tenderness on palpation to neck muscles. Suspect these could be tension headaches related to muscle tightness.  -Continue muscle relaxants and tylenol for pain control  -LP without evidence of meningitis  -Hold off on brain imaging      GI  # H/o Rachael en Y gastric bypass. Likely affecting absorption, thus patient is on multiple supplements.  -Continue Calcium and Magnesium replacements.      Pain  # Chronic chest wall pain after mastectomy.   -Continue MS Contin 30 mg morning and afternoon and 60 mg at night.  -Oxycodone 30 mg q4hrs prn.  -Celebrex 200 BID.   -Lidocaine cream prn.      #Chronic muscle cramps.   -Continue KCl 20 mEq daily, Robaxin 750 mg QID, Valium 5 mg TID prn, Flexeril prn.      ID  #Anti-infectives.  -Continue PTA acyclovir and fluconazole.  -Will hold Levaquin ppx as patient is not neutropenic. To resume on discharge or when ANC <1000.      MISC  #Lymphedema. Of LE. Improved per patient. Wearing compression stockings.   -Continue PTA HCTZ and Lasix. Extra Lasix 20 mg this afternoon 2/2 fluid retention and weight gain.   -Monitor BMP daily      #Asthma, allergies.   -Continue PTA singular,  zyrtec.  -Patient states she is not taking her inhalers. Requests only albuterol prn rescue.      FEN  -IVF per chemo regimen  -PTA electrolyte replacements and lyte protocol  -RDAT      PPx  -VTE: Lovenox ppx  -PUD: PTA ranitidine  -Bowels: PTA docusate and miralax       Dispo: Discharge tomorrow after completion of chemo (11/20). Has neulasta scheduled for 11/22 and f/u with Dr. Sauceda on 11/28.       Above plan discussed with staff physician, JAMESON CasasC  Hematology/Oncology  Pager: [phone]    Interval History   Doing well this morning. Relayed that CXR was normal yesterday. Feeling a bit fluid overloaded from the IVF. Discussed plan for discharge tomorrow. Breathing is stable.    Physical Exam   Temp: 97.6  F (36.4  C) Temp src: Oral BP: 126/75 Pulse: 89 Heart Rate: 88 Resp: 18 SpO2: 96 % O2 Device: None (Room air)    Vitals:    11/16/17 1400 11/17/17 0839 11/18/17 0700   Weight: 84.8 kg (186 lb 15.2 oz) 84.5 kg (186 lb 3.2 oz) 85.8 kg (189 lb 1.6 oz)     Vital Signs with Ranges  Temp:  [96.5  F (35.8  C)-98.7  F (37.1  C)] 97.6  F (36.4  C)  Pulse:  [89] 89  Heart Rate:  [] 88  Resp:  [18] 18  BP: (120-138)/(67-85) 126/75  SpO2:  [93 %-96 %] 96 %  I/O last 3 completed shifts:  In: 5404.1 [P.O.:3570; IV Piggyback:1834.1]  Out: 6780 [Urine:6780]    Constitutional: Pleasant female seen sitting in bed. No apparent distress, and appears stated age.  Eyes: Lids and lashes normal, sclera clear, conjunctiva normal.  ENT: Normocephalic, oral pharynx with moist mucus membranes, tonsils without erythema or exudates, gums normal and good dentition.   Respiratory: No increased work of breathing, good air exchange, clear to auscultation bilaterally, no crackles or wheezing.  Cardiovascular: Regular rate and rhythm, normal S1 and S2, and no murmur noted.  GI: No masses or scars. +BS. Soft. Tenderness on palpation to LUQ.  Skin: No bruising or bleeding, no redness, warmth, or swelling,  no rashes, no lesions, no jaundice.  Extremities: There is no redness, warmth, or swelling of the joints. +1 lower extremity edema, compression stockings in place. No cyanosis.  Neurologic: Awake, alert, oriented to name, place and time.    Vascular access: Port, c/d/i    Medications     - MEDICATION INSTRUCTIONS -       - MEDICATION INSTRUCTIONS -       NaCl         enoxaparin  40 mg Subcutaneous Q24H     furosemide  20 mg Oral BID     PROBIOTIC DAILY  1 capsule Oral QPM     Digestzymes 2 capsules  2 capsule Oral or Feeding Tube TID     Calcium D-Glucarate 3 tablets  3 capsule Oral TID     Muscle Mag 2 caps  2 capsule Oral TID     ondansetron  16 mg Oral Q24H     [START ON 11/20/2017] fosaprepitant (EMEND) 150 mg intermittent infusion  150 mg Intravenous Once     [START ON 11/22/2017] dexamethasone  8 mg Oral Daily     predniSONE  30 mg/m2 (Treatment Plan Recorded) Oral BID     Chemotherapy Infusing-Continuous Infusion   Does not apply Q8H     etoposide (TOPOSAR) chemo infusion  50 mg/m2 (Treatment Plan Recorded) Intravenous Q24H     vinCRIStine/DOXOrubicin (ONCOVIN/ADRIAMYCIN) chemo infusion  0.4 mg/m2 (Treatment Plan Recorded) Intravenous Q24H     [START ON 11/20/2017] cyclophosphamide (CYTOXAN) chemo infusion  1,500 mg Intravenous Once     [START ON 11/21/2017] filgrastim (NEUPOGEN/GRANIX) intravenous  5 mcg/kg (Treatment Plan Recorded) Intravenous Daily at 8 pm     acyclovir  400 mg Oral BID     allopurinol  300 mg Oral Daily     calcium citrate-vitamin D  2 tablet Oral TID     celecoxib  200 mg Oral BID     cetirizine  10 mg Oral TID     diazepam  5 mg Oral TID     ferrous sulfate  325 mg Oral TID w/meals     fluconazole  200 mg Oral Daily     levothyroxine  224 mcg Oral Once per day on Mon Tue Wed Thu Fri Sat     methocarbamol  750 mg Oral 4x Daily     montelukast  20 mg Oral BID     morphine  30 mg Oral BID     morphine (MS CONTIN) 12 hr tablet 60 mg  60 mg Oral Daily     potassium chloride SA  20 mEq Oral  Daily     ranitidine  75 mg Oral TID     senna-docusate  2 tablet Oral TID     docusate sodium  100 mg Oral TID     hydrochlorothiazide  12.5 mg Oral Daily     polyethylene glycol  17 g Oral TID     cholecalciferol  5,000 Units Oral Daily     bisacodyl  5 mg Oral BID     cromolyn  1 drop Both Eyes 4x Daily     diclofenac  2 g Topical 4x Daily     levothyroxine  336 mcg Oral Weekly     calcitRIOL  0.25 mcg Oral QAM     calcitRIOL  0.5 mcg Oral Daily with lunch     heparin lock flush  5-10 mL Intracatheter Q24H     heparin  5 mL Intracatheter Q28 Days       Data   ROUTINE IP LABS (Last four results)  BMP    Recent Labs  Lab 11/19/17 0338 11/18/17 0448 11/17/17  0405 11/16/17  0658    143 142 141   POTASSIUM 3.5 4.0 4.0 3.8   CHLORIDE 104 107 107 104   ELMO 8.1* 8.2* 8.3* 8.4*   CO2 30 28 28 28   BUN 20 14 12 14   CR 0.74 0.70 0.70 0.80   * 161* 168* 143*     CBC    Recent Labs  Lab 11/19/17 0338 11/18/17 0448 11/17/17  0405 11/16/17  0658   WBC 10.0 13.4* 8.0 6.1   RBC 3.55* 3.35* 3.47* 3.62*   HGB 9.7* 9.1* 9.4* 9.9*   HCT 31.4* 30.1* 30.8* 31.7*   MCV 89 90 89 88   MCH 27.3 27.2 27.1 27.3   MCHC 30.9* 30.2* 30.5* 31.2*   RDW 22.3* 22.7* 21.9* 21.5*    330 303 345     INRNo lab results found in last 7 days.

## 2017-11-19 NOTE — PLAN OF CARE
Problem: Patient Care Overview  Goal: Plan of Care/Patient Progress Review   11/19/17 0602   OTHER   Plan Of Care Reviewed With patient   Plan of Care Review   Progress no change       Problem: Chemotherapy Effects (Adult)  Goal: Signs and Symptoms of Listed Potential Problems Will be Absent, Minimized or Managed (Chemotherapy Effects)  Signs and symptoms of listed potential problems will be absent, minimized or managed by discharge/transition of care (reference Chemotherapy Effects (Adult) CPG).    11/19/17 0602   Chemotherapy Effects   Problems Assessed (Chemotherapy Effects) all   Problems Present (Chemotherapy Effects) fatigue;nausea and vomiting       Problem: Pain, Acute (Adult)  Goal: Identify Related Risk Factors and Signs and Symptoms  Related risk factors and signs and symptoms are identified upon initiation of Human Response Clinical Practice Guideline (CPG).   11/19/17 0602   Pain, Acute   Related Risk Factors (Acute Pain) disease process;persistent pain   Signs and Symptoms (Acute Pain) fatigue/weakness;verbalization of pain descriptors;sleep pattern alteration;pacing/restlessness;guarding/abnormal posturing/positioning     Pt continues to complain of pain to back and chest due to tumor. Pain unchanged. Scheduled morphine given. Three doses of PRN oxycodone given with minimal relief at times. Using lidocaine and Voltaren cream. Pt stretching and repositioning as able with persistent pain. One dose of zofran given for nausea overnight. Lactic drawn due to tachycardia in evening. Lactic acid 2.3, MD paged and called nurse, no change in POC, continue to monitor. HR decreased this am. Chemo continues to infuse as ordered. Good blood return from port. Pt up independently. No replacements needed this am. Hourly rounding done. Will continue to monitor.

## 2017-11-19 NOTE — PLAN OF CARE
Problem: Patient Care Overview  Goal: Individualization & Mutuality  Outcome: No Change  Type of chemo infused: Etoposide and Vincristine.   Pt tolerated infusion:  Yes.   Interventions: Good blood return on the port cath. Patient received Celebrex 200 mg po x 1, Zofran 16 mg po.   Response to interventions used: N/A.  Plan: Continue with plan of care and notify MD for status changes.

## 2017-11-19 NOTE — PLAN OF CARE
Problem: Patient Care Overview  Goal: Plan of Care/Patient Progress Review  Outcome: No Change   11/19/17 4588   OTHER   Plan Of Care Reviewed With patient   Plan of Care Review   Progress no change     Goal: Individualization & Mutuality  Outcome: No Change  Type of chemo infused: Etoposide and Vincristine.   Pt tolerated infusion: Yes.   Interventions: Good blood return on the port cath. Patient received premedications.   Response to interventions used: N/A.  Plan: Continue with plan of care and notify MD for status changes

## 2017-11-20 LAB
ALBUMIN SERPL-MCNC: 2.6 G/DL (ref 3.4–5)
ALP SERPL-CCNC: 64 U/L (ref 40–150)
ALT SERPL W P-5'-P-CCNC: 20 U/L (ref 0–50)
ANION GAP SERPL CALCULATED.3IONS-SCNC: 8 MMOL/L (ref 3–14)
AST SERPL W P-5'-P-CCNC: 16 U/L (ref 0–45)
BASOPHILS # BLD AUTO: 0 10E9/L (ref 0–0.2)
BASOPHILS NFR BLD AUTO: 0.1 %
BILIRUB SERPL-MCNC: 0.4 MG/DL (ref 0.2–1.3)
BUN SERPL-MCNC: 22 MG/DL (ref 7–30)
CALCIUM SERPL-MCNC: 7.9 MG/DL (ref 8.5–10.1)
CHLORIDE SERPL-SCNC: 100 MMOL/L (ref 94–109)
CO2 SERPL-SCNC: 33 MMOL/L (ref 20–32)
COPATH REPORT: NORMAL
CREAT SERPL-MCNC: 0.71 MG/DL (ref 0.52–1.04)
DIFFERENTIAL METHOD BLD: ABNORMAL
EOSINOPHIL # BLD AUTO: 0 10E9/L (ref 0–0.7)
EOSINOPHIL NFR BLD AUTO: 0 %
ERYTHROCYTE [DISTWIDTH] IN BLOOD BY AUTOMATED COUNT: 21.5 % (ref 10–15)
GFR SERPL CREATININE-BSD FRML MDRD: 85 ML/MIN/1.7M2
GLUCOSE SERPL-MCNC: 146 MG/DL (ref 70–99)
HCT VFR BLD AUTO: 29.3 % (ref 35–47)
HGB BLD-MCNC: 9.1 G/DL (ref 11.7–15.7)
IMM GRANULOCYTES # BLD: 0 10E9/L (ref 0–0.4)
IMM GRANULOCYTES NFR BLD: 0.1 %
LYMPHOCYTES # BLD AUTO: 0.2 10E9/L (ref 0.8–5.3)
LYMPHOCYTES NFR BLD AUTO: 2.5 %
MCH RBC QN AUTO: 27.2 PG (ref 26.5–33)
MCHC RBC AUTO-ENTMCNC: 31.1 G/DL (ref 31.5–36.5)
MCV RBC AUTO: 88 FL (ref 78–100)
MONOCYTES # BLD AUTO: 0.1 10E9/L (ref 0–1.3)
MONOCYTES NFR BLD AUTO: 1.2 %
NEUTROPHILS # BLD AUTO: 6.5 10E9/L (ref 1.6–8.3)
NEUTROPHILS NFR BLD AUTO: 96.1 %
NRBC # BLD AUTO: 0 10*3/UL
NRBC BLD AUTO-RTO: 1 /100
PLATELET # BLD AUTO: 342 10E9/L (ref 150–450)
POTASSIUM SERPL-SCNC: 3.5 MMOL/L (ref 3.4–5.3)
PROT SERPL-MCNC: 5.6 G/DL (ref 6.8–8.8)
RBC # BLD AUTO: 3.35 10E12/L (ref 3.8–5.2)
SODIUM SERPL-SCNC: 142 MMOL/L (ref 133–144)
WBC # BLD AUTO: 6.7 10E9/L (ref 4–11)

## 2017-11-20 PROCEDURE — S0169 CALCITROL: HCPCS | Performed by: PHYSICIAN ASSISTANT

## 2017-11-20 PROCEDURE — 25000125 ZZHC RX 250: Performed by: PHYSICIAN ASSISTANT

## 2017-11-20 PROCEDURE — 20000002 ZZH R&B BMT INTERMEDIATE

## 2017-11-20 PROCEDURE — 25000132 ZZH RX MED GY IP 250 OP 250 PS 637: Performed by: PHYSICIAN ASSISTANT

## 2017-11-20 PROCEDURE — 80053 COMPREHEN METABOLIC PANEL: CPT | Performed by: PHYSICIAN ASSISTANT

## 2017-11-20 PROCEDURE — 25000128 H RX IP 250 OP 636: Performed by: PHYSICIAN ASSISTANT

## 2017-11-20 PROCEDURE — 25000131 ZZH RX MED GY IP 250 OP 636 PS 637: Performed by: PHYSICIAN ASSISTANT

## 2017-11-20 PROCEDURE — 85025 COMPLETE CBC W/AUTO DIFF WBC: CPT | Performed by: PHYSICIAN ASSISTANT

## 2017-11-20 RX ORDER — ONDANSETRON 8 MG/1
8 TABLET, ORALLY DISINTEGRATING ORAL EVERY 8 HOURS PRN
Qty: 30 TABLET | Refills: 1 | Status: ON HOLD | OUTPATIENT
Start: 2017-11-20 | End: 2017-12-11

## 2017-11-20 RX ORDER — LEVOFLOXACIN 250 MG/1
250 TABLET, FILM COATED ORAL DAILY
Qty: 30 TABLET | Refills: 0 | Status: ON HOLD | OUTPATIENT
Start: 2017-11-20 | End: 2017-12-11

## 2017-11-20 RX ORDER — ACYCLOVIR 400 MG/1
400 TABLET ORAL 2 TIMES DAILY
Qty: 60 TABLET | Refills: 0 | Status: ON HOLD | OUTPATIENT
Start: 2017-11-20 | End: 2017-12-11

## 2017-11-20 RX ORDER — DEXAMETHASONE 4 MG/1
8 TABLET ORAL DAILY
Qty: 4 TABLET | Refills: 0 | Status: ON HOLD | OUTPATIENT
Start: 2017-11-22 | End: 2017-12-11

## 2017-11-20 RX ORDER — FLUCONAZOLE 200 MG/1
200 TABLET ORAL DAILY
Qty: 30 TABLET | Refills: 0 | Status: ON HOLD | OUTPATIENT
Start: 2017-11-20 | End: 2017-12-11

## 2017-11-20 RX ORDER — LIDOCAINE 50 MG/G
OINTMENT TOPICAL 3 TIMES DAILY PRN
Qty: 150 G | Refills: 1 | Status: SHIPPED | OUTPATIENT
Start: 2017-11-20 | End: 2017-11-24

## 2017-11-20 RX ORDER — FUROSEMIDE 20 MG
20 TABLET ORAL 2 TIMES DAILY
Qty: 60 TABLET | Refills: 0 | Status: ON HOLD | OUTPATIENT
Start: 2017-11-20 | End: 2018-01-22

## 2017-11-20 RX ORDER — MONTELUKAST SODIUM 10 MG/1
10 TABLET ORAL 2 TIMES DAILY
Qty: 60 TABLET | Refills: 0 | Status: ON HOLD | OUTPATIENT
Start: 2017-11-20 | End: 2018-01-22

## 2017-11-20 RX ADMIN — MORPHINE SULFATE 30 MG: 15 TABLET, EXTENDED RELEASE ORAL at 04:50

## 2017-11-20 RX ADMIN — FLUCONAZOLE 200 MG: 200 TABLET ORAL at 04:51

## 2017-11-20 RX ADMIN — CETIRIZINE HYDROCHLORIDE 10 MG: 10 TABLET, FILM COATED ORAL at 04:50

## 2017-11-20 RX ADMIN — FUROSEMIDE 20 MG: 20 TABLET ORAL at 11:20

## 2017-11-20 RX ADMIN — OXYCODONE HYDROCHLORIDE 30 MG: 10 TABLET ORAL at 23:21

## 2017-11-20 RX ADMIN — CROMOLYN SODIUM 1 DROP: 40 SOLUTION/ DROPS OPHTHALMIC at 14:58

## 2017-11-20 RX ADMIN — RANITIDINE 75 MG: 75 TABLET, FILM COATED ORAL at 11:18

## 2017-11-20 RX ADMIN — DICLOFENAC SODIUM 2 G: 10 GEL TOPICAL at 14:59

## 2017-11-20 RX ADMIN — OXYCODONE HYDROCHLORIDE 30 MG: 10 TABLET ORAL at 18:15

## 2017-11-20 RX ADMIN — CROMOLYN SODIUM 1 DROP: 40 SOLUTION/ DROPS OPHTHALMIC at 11:21

## 2017-11-20 RX ADMIN — OXYCODONE HYDROCHLORIDE 30 MG: 10 TABLET ORAL at 12:09

## 2017-11-20 RX ADMIN — HYDROCHLOROTHIAZIDE 12.5 MG: 12.5 CAPSULE ORAL at 11:19

## 2017-11-20 RX ADMIN — SODIUM CHLORIDE 150 MG: 9 INJECTION, SOLUTION INTRAVENOUS at 11:27

## 2017-11-20 RX ADMIN — DOCUSATE SODIUM 100 MG: 100 CAPSULE, LIQUID FILLED ORAL at 04:53

## 2017-11-20 RX ADMIN — FERROUS SULFATE TAB 325 MG (65 MG ELEMENTAL FE) 325 MG: 325 (65 FE) TAB at 17:56

## 2017-11-20 RX ADMIN — CELECOXIB 200 MG: 100 CAPSULE ORAL at 17:57

## 2017-11-20 RX ADMIN — RANITIDINE 75 MG: 75 TABLET, FILM COATED ORAL at 17:57

## 2017-11-20 RX ADMIN — PREDNISONE 60 MG: 50 TABLET ORAL at 20:47

## 2017-11-20 RX ADMIN — DICLOFENAC SODIUM 2 G: 10 GEL TOPICAL at 05:01

## 2017-11-20 RX ADMIN — BISACODYL 5 MG: 5 TABLET, COATED ORAL at 04:49

## 2017-11-20 RX ADMIN — Medication 2 TABLET: at 04:49

## 2017-11-20 RX ADMIN — FERROUS SULFATE TAB 325 MG (65 MG ELEMENTAL FE) 325 MG: 325 (65 FE) TAB at 11:19

## 2017-11-20 RX ADMIN — Medication 2 TABLET: at 11:18

## 2017-11-20 RX ADMIN — LEVOTHYROXINE SODIUM 224 MCG: 112 TABLET ORAL at 04:49

## 2017-11-20 RX ADMIN — SENNOSIDES AND DOCUSATE SODIUM 1 TABLET: 8.6; 5 TABLET ORAL at 11:18

## 2017-11-20 RX ADMIN — ACYCLOVIR 400 MG: 400 TABLET ORAL at 04:50

## 2017-11-20 RX ADMIN — MORPHINE SULFATE 60 MG: 60 TABLET, EXTENDED RELEASE ORAL at 20:48

## 2017-11-20 RX ADMIN — ACYCLOVIR 400 MG: 400 TABLET ORAL at 17:57

## 2017-11-20 RX ADMIN — CALCITRIOL 0.5 MCG: 0.5 CAPSULE, LIQUID FILLED ORAL at 11:19

## 2017-11-20 RX ADMIN — CETIRIZINE HYDROCHLORIDE 10 MG: 10 TABLET, FILM COATED ORAL at 17:56

## 2017-11-20 RX ADMIN — LIDOCAINE HYDROCHLORIDE: 20 JELLY TOPICAL at 20:59

## 2017-11-20 RX ADMIN — CETIRIZINE HYDROCHLORIDE 10 MG: 10 TABLET, FILM COATED ORAL at 11:19

## 2017-11-20 RX ADMIN — MORPHINE SULFATE 30 MG: 15 TABLET, EXTENDED RELEASE ORAL at 11:19

## 2017-11-20 RX ADMIN — CELECOXIB 200 MG: 100 CAPSULE ORAL at 04:50

## 2017-11-20 RX ADMIN — MONTELUKAST SODIUM 20 MG: 10 TABLET, FILM COATED ORAL at 17:57

## 2017-11-20 RX ADMIN — DIAZEPAM 5 MG: 5 TABLET ORAL at 11:19

## 2017-11-20 RX ADMIN — CROMOLYN SODIUM 1 DROP: 40 SOLUTION/ DROPS OPHTHALMIC at 17:58

## 2017-11-20 RX ADMIN — FUROSEMIDE 20 MG: 20 TABLET ORAL at 04:50

## 2017-11-20 RX ADMIN — METHOCARBAMOL 750 MG: 750 TABLET ORAL at 11:20

## 2017-11-20 RX ADMIN — DIAZEPAM 5 MG: 5 TABLET ORAL at 17:56

## 2017-11-20 RX ADMIN — CALCITRIOL 0.25 MCG: 0.25 CAPSULE, LIQUID FILLED ORAL at 05:43

## 2017-11-20 RX ADMIN — Medication 1 CAPSULE: at 18:01

## 2017-11-20 RX ADMIN — Medication 5000 UNITS: at 18:12

## 2017-11-20 RX ADMIN — POLYETHYLENE GLYCOL 3350 17 G: 17 POWDER, FOR SOLUTION ORAL at 17:57

## 2017-11-20 RX ADMIN — METHOCARBAMOL 750 MG: 750 TABLET ORAL at 14:58

## 2017-11-20 RX ADMIN — SENNOSIDES AND DOCUSATE SODIUM 2 TABLET: 8.6; 5 TABLET ORAL at 17:57

## 2017-11-20 RX ADMIN — FERROUS SULFATE TAB 325 MG (65 MG ELEMENTAL FE) 325 MG: 325 (65 FE) TAB at 04:49

## 2017-11-20 RX ADMIN — POTASSIUM CHLORIDE 20 MEQ: 750 TABLET, EXTENDED RELEASE ORAL at 04:51

## 2017-11-20 RX ADMIN — DICLOFENAC SODIUM 2 G: 10 GEL TOPICAL at 11:21

## 2017-11-20 RX ADMIN — ONDANSETRON HYDROCHLORIDE 16 MG: 8 TABLET, FILM COATED ORAL at 17:57

## 2017-11-20 RX ADMIN — PREDNISONE 60 MG: 50 TABLET ORAL at 08:44

## 2017-11-20 RX ADMIN — METHOCARBAMOL 750 MG: 750 TABLET ORAL at 17:56

## 2017-11-20 RX ADMIN — RANITIDINE 75 MG: 75 TABLET, FILM COATED ORAL at 04:50

## 2017-11-20 RX ADMIN — DIAZEPAM 5 MG: 5 TABLET ORAL at 04:51

## 2017-11-20 RX ADMIN — Medication 2 TABLET: at 17:56

## 2017-11-20 RX ADMIN — DICLOFENAC SODIUM 2 G: 10 GEL TOPICAL at 17:59

## 2017-11-20 RX ADMIN — CROMOLYN SODIUM 1 DROP: 40 SOLUTION/ DROPS OPHTHALMIC at 04:51

## 2017-11-20 RX ADMIN — ENOXAPARIN SODIUM 40 MG: 40 INJECTION SUBCUTANEOUS at 08:44

## 2017-11-20 RX ADMIN — METHOCARBAMOL 750 MG: 750 TABLET ORAL at 04:51

## 2017-11-20 RX ADMIN — ALLOPURINOL 300 MG: 300 TABLET ORAL at 04:50

## 2017-11-20 RX ADMIN — OXYCODONE HYDROCHLORIDE 30 MG: 10 TABLET ORAL at 04:50

## 2017-11-20 RX ADMIN — MONTELUKAST SODIUM 20 MG: 10 TABLET, FILM COATED ORAL at 04:50

## 2017-11-20 RX ADMIN — POLYETHYLENE GLYCOL 3350 17 G: 17 POWDER, FOR SOLUTION ORAL at 05:44

## 2017-11-20 ASSESSMENT — PAIN DESCRIPTION - DESCRIPTORS
DESCRIPTORS: CONSTANT;ACHING;BURNING
DESCRIPTORS: CONSTANT;ACHING;BURNING

## 2017-11-20 NOTE — PROGRESS NOTES
"Oncology/Hematology Visit Note  Nov 16, 2017     Reason for Visit: Follow up of DLBCL    History of Present Illness: Tisha Arias is a 57 year old female with high risk diffuse \"double-hit\"-like DLBCL. She is currently undergoing treatment with DA-R-EPOCH. Her past medical history is significant for breast cancer in 2011 s/p bilateral mastectomies and BENJIE/BSO up until recently on Tamoxifen, papillary thyroid cancer s/p total thyroidectomy, chronic chest wall pain, and asthma. Briefly, her oncologic history is as follows:    She presented in 8/2017 with dramatically worse chest and back pain. CT 9/1/17 showed lytic lesion right 10th rib extending to right T9-10 neural foramen with vertebral body invasion. There was associated conglomerate of lymphadenopathy around the mid T-spine. She had a CT guided biopsy showing B-cell high grade lymphoma. Pathology showed \"The morphology shows a population of large cells, diffuse lymphoid infiltrate. The cells are atypical with abundant mitotic and apoptotic activity and single cell necrosis. Tumor is CD45 and CD79a positive and focally weakly CD30 positive. The other stains revealed positivity for CD20, BCL6, BCL2 and MUM1, and CD10 and CD21 negative. The CD5 and CD3 highlighted background T-cells.  MYC expression was equivocal with approximately 40% nuclei staining.  Ki-67 proliferative index is greater than 90-95%. The immunohistochemistry is suggestive of non-GCB immunophenotype of diffuse large B-cell lymphoma. The cytogenetics did not show rearrangement of the BCL2 or c-MYC. There is the presence of BCL6 rearrangement in 78% of cells and gains of MYC and BCL2, but no amplifications.\"     Staging LP and BMBx were negative for lymphoma. She started DA-REPOCH 10/6/17. She did well with chemo other than rigors during CIVI. D/c 10/11/17. Given Neulasta 10/12/17. Post cycle 1 complicated by mucositis and worsening of chronic LE peripheral edema. She began cycle 2 on " "10/27/17. Due to a rise in her LD and uric acid levels on that admission day, she underwent a CT scan which showed response to therapy with improvement in her mediastinal lymphadenopathy and right chest wall mass. She comes in today for routine follow up prior to admission for cycle 3.     Interval History:  Patient reports that she continues to have intermittent headaches from the occipital area to the front, managed with Tylenol and Imitrex. She has had 2 further episodes of chest pain since her last visit here, each lasting about 2 hours. She continue to have dyspnea on exertion with stairs. She reports less body shaking as she gets further out from her last cycle of chemotherapy. She denies any change to her abdominal pain and is using Colace and Dulcolax to keep her bowels regular. She denies neuropathy, but reports that her toes feel heavy in her socks. She denies other concerns.     Review of Systems:  Patient denies any of the following except if noted above: fevers, chills, difficulty with energy, vision or hearing changes, current chest pain, dyspnea at rest, new abdominal pain, nausea, vomiting, diarrhea, urinary concerns, numbness, tingling, issues with sleep or mood.      Medications reviewed in the EMR.    Physical Examination:  /73 (BP Location: Left arm, Patient Position: Chair, Cuff Size: Adult Regular)  Pulse 121  Temp 98.1  F (36.7  C) (Oral)  Resp 16  Ht 1.651 m (5' 5\")  Wt 85.1 kg (187 lb 11.2 oz)  SpO2 98%  BMI 31.23 kg/m2  Wt Readings from Last 10 Encounters:   11/18/17 85.8 kg (189 lb 1.6 oz)   11/16/17 85.1 kg (187 lb 11.2 oz)   11/13/17 85.3 kg (188 lb)   11/10/17 84.8 kg (187 lb)   11/06/17 83.3 kg (183 lb 9.6 oz)   11/03/17 82.6 kg (182 lb)   10/31/17 85 kg (187 lb 8 oz)   10/27/17 85.1 kg (187 lb 9.8 oz)   10/23/17 85.9 kg (189 lb 4.8 oz)   10/23/17 85.9 kg (189 lb 4.8 oz)   Constitutional: Well-appearing female in no acute distress.  Eyes: EOMI, PERRL. No scleral " "icterus.  ENT: Oral mucosa is moist without lesions or thrush.   Lymphatic: Neck is supple without cervical or supraclavicular lymphadenopathy.   Cardiovascular: Regular rate and rhythm.  Respiratory: Clear to auscultation bilaterally. No wheezes or crackles.  Gastrointestinal: Bowel sounds present. Abdomen soft, nontender.    Neurologic: Cranial nerves II through XII are intact.   Skin: No rashes, petechiae, or bruising noted on exposed skin.   Extremities: 1+ lower extremity edema bilaterally, compression stockings in place.    Laboratory Data:   11/16/2017 06:58   Sodium 141   Potassium 3.8   Chloride 104   Carbon Dioxide 28   Urea Nitrogen 14   Creatinine 0.80   GFR Estimate 74   GFR Estimate If Black 89   Calcium 8.4 (L)   Anion Gap 8   Albumin 2.7 (L)   Protein Total 5.8 (L)   Bilirubin Total 0.2   Alkaline Phosphatase 85   ALT 26   AST 21   Lactate Dehydrogenase 376 (H)   Uric Acid 6.1 (H)   Glucose 143 (H)   WBC 6.1   Hemoglobin 9.9 (L)   Hematocrit 31.7 (L)   Platelet Count 345   RBC Count 3.62 (L)   MCV 88   MCH 27.3   MCHC 31.2 (L)   RDW 21.5 (H)   Diff Method Manual Differential   % Neutrophils 71.3   % Lymphocytes 13.9   % Monocytes 10.4   % Eosinophils 0.0   % Basophils 2.6   % Metamyelocytes 0.9   % Myelocytes 0.9   Nucleated RBCs 2 (H)   Absolute Neutrophil 4.3   Absolute Lymphocytes 0.8   Absolute Monocytes 0.6   Absolute Eosinophils 0.0   Absolute Basophils 0.2   Absolute Metamyelocytes 0.1 (H)   Absolute Myelocytes 0.1 (H)   Absolute Nucleated RBC 0.1   Anisocytosis Moderate   Poikilocytosis Slight   Polychromasia Slight   Teardrop Cells Slight   Ovalocytes Slight   Platelet Estimate Confirming automa...     Assessment and Plan:    1. DLBCL, \"double-hit\"-like, currently on treatment with DA-R-EPOCH  Cycle 1 overall tolerated well other than mucositis and mild infusion reaction (rigors) to continuous infusion of chemotherapy. Interval CT CAP after 1 cycle due to elevated LD and uric acid showed " response to treatment. She tolerated cycle 2 fairly well with some fatigue, nausea, and constipation. She will be admitted for cycle 3 today. She will have twice weekly labs after hospital discharge. She will have a PET/CT after cycle 4.     2. Sensation of body shaking.  Improved. Present since starting chemotherapy. Possibly a side effect. She declines a referral to neurology. If worsens, will need to reconsider referral.     3. Heme  No transfusion needs today. Continue to monitor with twice weekly labs and transfuse if Hgb<8 and Plt<10k after hospital d/c.    4. ID  Continue ppx ACV 400mg BID. Hold levaquin 250mg daily and fluconazole 200mg daily, as not neutropenic.     5. Lower extremity edema with pain  Chronic condition likely exacerbated by steroids and excessive fluids. US on 10/21 negative for DVT bilaterally. Continue Lasix 20 mg daily. Continue pain medications as prescribed by palliative care. Previously, encouraged increased protein in diet and recommend pushing fluids with a goal of 64 oz/day.     6. History of stage 1, ER/MI+, HER2- breast cancer s/p bilateral mastectomies and BENJIE/BSO  Follows with Dr. Crawley. Oncotype recurrence score 11%. Was initially on Arimidex (9791-2187) but changed to Tamoxifen due to arthralgias. Tamoxifen held since 10/5 and continue to hold with chemotherapy. F/u Dr. Crawley 11/27/17    7. History of papillary thyroid cancer, s/p total thyroidectomy  Follows with Dr. Castro. Has post surgical hypothyroidism. Continue levothyroxine and calcitriol as prescribed. Neck u/s on 11/2 shows no evidence of disease.    8. History of Rachael en Y gastric bypass  Continue supplements (calcium citrate-vitamin D, vitamin D3, magnesium citrate). Also has iron deficiency anemia likely from poor absorption and per Dr. Sauceda, she should continue 325mg PO ferrous sulfate TID.     9. Chronic chest wall pain s/p mastectomies  Follows with Dr. Benitez. Palliative care to continue to  prescribe pain medications. Taking MS Contin, Oxycodone for breakthrough pain, and celebrex.     10. Constipation.  Under control with Colace and Dulcolax, which she will continue.      Jessica Cox PA-C  [facility]  [address], [place] [id number] [phone]

## 2017-11-20 NOTE — PLAN OF CARE
Problem: Chemotherapy Effects (Adult)  Goal: Signs and Symptoms of Listed Potential Problems Will be Absent, Minimized or Managed (Chemotherapy Effects)  Signs and symptoms of listed potential problems will be absent, minimized or managed by discharge/transition of care (reference Chemotherapy Effects (Adult) CPG).   Outcome: No Change   11/20/17 0607   Chemotherapy Effects   Problems Assessed (Chemotherapy Effects) all   Problems Present (Chemotherapy Effects) fatigue;nausea and vomiting     A/VSS afebrile. Oxycodone 30 mg given x 2 for chronic chest and back pain. Chemo infusing, blood return. Compazine 10 mg given x 1 for nausea/ relief. Pt slept well overnight. Voiding good amounts, 1 stool overnight. Pt very hopefull to discharge in the early afternoon. Morning labs: Hbg 9.1, Platelets 341, K 3.5 NO replacements required. Will continue to monitor per plan of care.

## 2017-11-21 VITALS
DIASTOLIC BLOOD PRESSURE: 68 MMHG | HEART RATE: 89 BPM | WEIGHT: 187.8 LBS | OXYGEN SATURATION: 96 % | SYSTOLIC BLOOD PRESSURE: 102 MMHG | HEIGHT: 63 IN | BODY MASS INDEX: 33.27 KG/M2 | RESPIRATION RATE: 18 BRPM | TEMPERATURE: 98.1 F

## 2017-11-21 LAB
ALBUMIN SERPL-MCNC: 2.7 G/DL (ref 3.4–5)
ALP SERPL-CCNC: 62 U/L (ref 40–150)
ALT SERPL W P-5'-P-CCNC: 22 U/L (ref 0–50)
ANION GAP SERPL CALCULATED.3IONS-SCNC: 7 MMOL/L (ref 3–14)
APPEARANCE CSF: CLEAR
AST SERPL W P-5'-P-CCNC: 12 U/L (ref 0–45)
BASOPHILS # BLD AUTO: 0 10E9/L (ref 0–0.2)
BASOPHILS NFR BLD AUTO: 0 %
BILIRUB SERPL-MCNC: 0.4 MG/DL (ref 0.2–1.3)
BUN SERPL-MCNC: 27 MG/DL (ref 7–30)
CALCIUM SERPL-MCNC: 7.7 MG/DL (ref 8.5–10.1)
CHLORIDE SERPL-SCNC: 100 MMOL/L (ref 94–109)
CO2 SERPL-SCNC: 34 MMOL/L (ref 20–32)
COLOR CSF: COLORLESS
CREAT SERPL-MCNC: 0.73 MG/DL (ref 0.52–1.04)
DIFFERENTIAL METHOD BLD: ABNORMAL
EOSINOPHIL # BLD AUTO: 0 10E9/L (ref 0–0.7)
EOSINOPHIL NFR BLD AUTO: 0 %
ERYTHROCYTE [DISTWIDTH] IN BLOOD BY AUTOMATED COUNT: 21.2 % (ref 10–15)
GFR SERPL CREATININE-BSD FRML MDRD: 82 ML/MIN/1.7M2
GLUCOSE SERPL-MCNC: 154 MG/DL (ref 70–99)
HCT VFR BLD AUTO: 29.4 % (ref 35–47)
HGB BLD-MCNC: 9.1 G/DL (ref 11.7–15.7)
IMM GRANULOCYTES # BLD: 0 10E9/L (ref 0–0.4)
IMM GRANULOCYTES NFR BLD: 0.2 %
LYMPHOCYTES # BLD AUTO: 0.2 10E9/L (ref 0.8–5.3)
LYMPHOCYTES NFR BLD AUTO: 3.4 %
MCH RBC QN AUTO: 27 PG (ref 26.5–33)
MCHC RBC AUTO-ENTMCNC: 31 G/DL (ref 31.5–36.5)
MCV RBC AUTO: 87 FL (ref 78–100)
MONOCYTES # BLD AUTO: 0 10E9/L (ref 0–1.3)
MONOCYTES NFR BLD AUTO: 0.5 %
NEUTROPHILS # BLD AUTO: 5.3 10E9/L (ref 1.6–8.3)
NEUTROPHILS NFR BLD AUTO: 95.9 %
NRBC # BLD AUTO: 0 10*3/UL
NRBC BLD AUTO-RTO: 0 /100
PLATELET # BLD AUTO: 335 10E9/L (ref 150–450)
POTASSIUM SERPL-SCNC: 3.6 MMOL/L (ref 3.4–5.3)
PROT SERPL-MCNC: 5.8 G/DL (ref 6.8–8.8)
RBC # BLD AUTO: 3.37 10E12/L (ref 3.8–5.2)
RBC # CSF MANUAL: 11 /UL (ref 0–2)
SODIUM SERPL-SCNC: 141 MMOL/L (ref 133–144)
TUBE # CSF: 3 #
WBC # BLD AUTO: 5.5 10E9/L (ref 4–11)
WBC # CSF MANUAL: 1 /UL (ref 0–5)

## 2017-11-21 PROCEDURE — S0169 CALCITROL: HCPCS | Performed by: PHYSICIAN ASSISTANT

## 2017-11-21 PROCEDURE — 25000128 H RX IP 250 OP 636: Performed by: PHYSICIAN ASSISTANT

## 2017-11-21 PROCEDURE — 25000131 ZZH RX MED GY IP 250 OP 636 PS 637: Performed by: PHYSICIAN ASSISTANT

## 2017-11-21 PROCEDURE — 25000132 ZZH RX MED GY IP 250 OP 250 PS 637: Performed by: PHYSICIAN ASSISTANT

## 2017-11-21 PROCEDURE — 25000125 ZZHC RX 250: Performed by: PHYSICIAN ASSISTANT

## 2017-11-21 PROCEDURE — 85025 COMPLETE CBC W/AUTO DIFF WBC: CPT | Performed by: PHYSICIAN ASSISTANT

## 2017-11-21 PROCEDURE — 80053 COMPREHEN METABOLIC PANEL: CPT | Performed by: PHYSICIAN ASSISTANT

## 2017-11-21 RX ADMIN — POTASSIUM CHLORIDE 20 MEQ: 750 TABLET, EXTENDED RELEASE ORAL at 05:23

## 2017-11-21 RX ADMIN — DIAZEPAM 5 MG: 5 TABLET ORAL at 05:23

## 2017-11-21 RX ADMIN — FERROUS SULFATE TAB 325 MG (65 MG ELEMENTAL FE) 325 MG: 325 (65 FE) TAB at 05:24

## 2017-11-21 RX ADMIN — CETIRIZINE HYDROCHLORIDE 10 MG: 10 TABLET, FILM COATED ORAL at 05:26

## 2017-11-21 RX ADMIN — PREDNISONE 60 MG: 50 TABLET ORAL at 07:07

## 2017-11-21 RX ADMIN — METHOCARBAMOL 750 MG: 750 TABLET ORAL at 05:24

## 2017-11-21 RX ADMIN — MONTELUKAST SODIUM 20 MG: 10 TABLET, FILM COATED ORAL at 05:23

## 2017-11-21 RX ADMIN — SENNOSIDES AND DOCUSATE SODIUM 2 TABLET: 8.6; 5 TABLET ORAL at 05:25

## 2017-11-21 RX ADMIN — FUROSEMIDE 20 MG: 20 TABLET ORAL at 05:24

## 2017-11-21 RX ADMIN — OXYCODONE HYDROCHLORIDE 30 MG: 10 TABLET ORAL at 03:23

## 2017-11-21 RX ADMIN — ACYCLOVIR 400 MG: 400 TABLET ORAL at 05:20

## 2017-11-21 RX ADMIN — CELECOXIB 200 MG: 100 CAPSULE ORAL at 05:21

## 2017-11-21 RX ADMIN — LEVOTHYROXINE SODIUM 224 MCG: 112 TABLET ORAL at 05:23

## 2017-11-21 RX ADMIN — DICLOFENAC SODIUM 2 G: 10 GEL TOPICAL at 05:59

## 2017-11-21 RX ADMIN — Medication 2 TABLET: at 05:21

## 2017-11-21 RX ADMIN — FLUCONAZOLE 200 MG: 200 TABLET ORAL at 05:23

## 2017-11-21 RX ADMIN — ALLOPURINOL 300 MG: 300 TABLET ORAL at 05:21

## 2017-11-21 RX ADMIN — RANITIDINE 75 MG: 75 TABLET, FILM COATED ORAL at 05:23

## 2017-11-21 RX ADMIN — CROMOLYN SODIUM 1 DROP: 40 SOLUTION/ DROPS OPHTHALMIC at 05:29

## 2017-11-21 RX ADMIN — MORPHINE SULFATE 30 MG: 15 TABLET, EXTENDED RELEASE ORAL at 05:25

## 2017-11-21 RX ADMIN — CYCLOPHOSPHAMIDE 1500 MG: 2 INJECTION, POWDER, FOR SOLUTION INTRAVENOUS; ORAL at 06:10

## 2017-11-21 RX ADMIN — CALCITRIOL 0.25 MCG: 0.25 CAPSULE, LIQUID FILLED ORAL at 05:21

## 2017-11-21 RX ADMIN — ENOXAPARIN SODIUM 40 MG: 40 INJECTION SUBCUTANEOUS at 07:07

## 2017-11-21 ASSESSMENT — PAIN DESCRIPTION - DESCRIPTORS: DESCRIPTORS: CONSTANT

## 2017-11-21 NOTE — PLAN OF CARE
Problem: Patient Care Overview  Goal: Plan of Care/Patient Progress Review  Outcome: Adequate for Discharge Date Met: 11/21/17    Afeb. VSS. Continues with chronic chest wall/muscle pain. Scheduled morphine and 30 mg oxycodone given x2, lidocaine and Voltaren cream applied. Denies any N/V/D. Port cath patent with positive blood return. Pt receiving continuous chemo which completed at 0600. Tolerated infusion well. Cytoxan currently infusing which is to complete at 0715. Pt is ready for discharge when chemo is complete. Discharge instructions given, medications, follow up appointments were reviewed. After care teaching was given with time allowed for questions. Pt acknowledged and understood information given with no further questions. All belongings were gathered and ready to be sent with patient. Patient medications are also with patient. Patient denies any of her belongings locked up or stored elsewhere.   For patient disposition, the plan was discussed with the Charge nurse that she would be driving herself home under the influence of narcotics. There are no notes written in her chart that clear her to drive by her care team but the patient claimed she was allowed to discharge this AM and drive herself while taking narcotics. Charge discussed with MD and he wanted her to wait until 0700 so her oxycodone would be mostly out of her system. Charge also discussed this plan with the unit manager who agreed that the patient is allowed to leave by herself while taking narcotics. Risks of driving while taking these certain medications were discussed and understood by patient. Pt is ready to discharge when port cath is heparinized and de accessed.

## 2017-11-21 NOTE — PLAN OF CARE
"Problem: Patient Care Overview  Goal: Plan of Care/Patient Progress Review  Outcome: Therapy, progress toward functional goals as expected   11/20/17 1922   OTHER   Plan Of Care Reviewed With patient   Plan of Care Review   Progress improving       Problem: Chemotherapy Effects (Adult)  Goal: Signs and Symptoms of Listed Potential Problems Will be Absent, Minimized or Managed (Chemotherapy Effects)  Signs and symptoms of listed potential problems will be absent, minimized or managed by discharge/transition of care (reference Chemotherapy Effects (Adult) CPG).   Outcome: No Change   11/20/17 1922   Chemotherapy Effects   Problems Assessed (Chemotherapy Effects) all   Problems Present (Chemotherapy Effects) fatigue;nausea and vomiting       Problem: Pain, Acute (Adult)  Goal: Identify Related Risk Factors and Signs and Symptoms  Related risk factors and signs and symptoms are identified upon initiation of Human Response Clinical Practice Guideline (CPG).   Outcome: Therapy, progress toward functional goals as expected   11/20/17 1922   Pain, Acute   Related Risk Factors (Acute Pain) disease process;persistent pain;trauma   Signs and Symptoms (Acute Pain) verbalization of pain descriptors     /72  Pulse 89  Temp 98.5  F (36.9  C) (Oral)  Resp 16  Ht 1.605 m (5' 3.19\")  Wt 85.2 kg (187 lb 12.8 oz)  SpO2 94%  BMI 33.07 kg/m2  VSS, AOx4, chronic pain, oxycodone x2 today with decrease.  Discharge orders ready, Pt will discharge in the AM following completion of Cytoxan.  Continuous Chemo running until 0500, cytoxan to follow.  Continue POC      "

## 2017-11-22 ENCOUNTER — ALLIED HEALTH/NURSE VISIT (OUTPATIENT)
Dept: ONCOLOGY | Facility: CLINIC | Age: 57
End: 2017-11-22
Payer: COMMERCIAL

## 2017-11-22 VITALS
RESPIRATION RATE: 18 BRPM | WEIGHT: 184.7 LBS | OXYGEN SATURATION: 100 % | BODY MASS INDEX: 32.52 KG/M2 | DIASTOLIC BLOOD PRESSURE: 71 MMHG | HEART RATE: 133 BPM | TEMPERATURE: 99.6 F | SYSTOLIC BLOOD PRESSURE: 109 MMHG

## 2017-11-22 DIAGNOSIS — C85.10 HIGH GRADE B-CELL LYMPHOMA (H): Primary | ICD-10-CM

## 2017-11-22 LAB
BACTERIA SPEC CULT: NO GROWTH
Lab: NORMAL
SPECIMEN SOURCE: NORMAL

## 2017-11-22 PROCEDURE — 96372 THER/PROPH/DIAG INJ SC/IM: CPT

## 2017-11-22 PROCEDURE — 25000128 H RX IP 250 OP 636: Mod: ZF | Performed by: PHYSICIAN ASSISTANT

## 2017-11-22 RX ADMIN — PEGFILGRASTIM 6 MG: 6 INJECTION SUBCUTANEOUS at 16:27

## 2017-11-22 ASSESSMENT — PAIN SCALES - GENERAL: PAINLEVEL: EXTREME PAIN (8)

## 2017-11-24 ENCOUNTER — CARE COORDINATION (OUTPATIENT)
Dept: ONCOLOGY | Facility: CLINIC | Age: 57
End: 2017-11-24

## 2017-11-24 DIAGNOSIS — C85.10 HIGH GRADE B-CELL LYMPHOMA (H): ICD-10-CM

## 2017-11-24 LAB
ACANTHOCYTES BLD QL SMEAR: ABNORMAL
ALBUMIN SERPL-MCNC: 3.1 G/DL (ref 3.4–5)
ALP SERPL-CCNC: 81 U/L (ref 40–150)
ALT SERPL W P-5'-P-CCNC: 23 U/L (ref 0–50)
ANION GAP SERPL CALCULATED.3IONS-SCNC: 10 MMOL/L (ref 3–14)
ANISOCYTOSIS BLD QL SMEAR: ABNORMAL
AST SERPL W P-5'-P-CCNC: 10 U/L (ref 0–45)
BASOPHILS # BLD AUTO: 0 10E9/L (ref 0–0.2)
BASOPHILS NFR BLD AUTO: 0 %
BILIRUB SERPL-MCNC: 0.5 MG/DL (ref 0.2–1.3)
BUN SERPL-MCNC: 25 MG/DL (ref 7–30)
CALCIUM SERPL-MCNC: 7.5 MG/DL (ref 8.5–10.1)
CHLORIDE SERPL-SCNC: 100 MMOL/L (ref 94–109)
CO2 SERPL-SCNC: 27 MMOL/L (ref 20–32)
CREAT SERPL-MCNC: 0.6 MG/DL (ref 0.52–1.04)
DIFFERENTIAL METHOD BLD: ABNORMAL
EOSINOPHIL # BLD AUTO: 0 10E9/L (ref 0–0.7)
EOSINOPHIL NFR BLD AUTO: 0 %
ERYTHROCYTE [DISTWIDTH] IN BLOOD BY AUTOMATED COUNT: 21.8 % (ref 10–15)
GFR SERPL CREATININE-BSD FRML MDRD: >90 ML/MIN/1.7M2
GLUCOSE SERPL-MCNC: 173 MG/DL (ref 70–99)
HCT VFR BLD AUTO: 30.7 % (ref 35–47)
HGB BLD-MCNC: 9.5 G/DL (ref 11.7–15.7)
LYMPHOCYTES # BLD AUTO: 0 10E9/L (ref 0.8–5.3)
LYMPHOCYTES NFR BLD AUTO: 0 %
MCH RBC QN AUTO: 27.9 PG (ref 26.5–33)
MCHC RBC AUTO-ENTMCNC: 30.9 G/DL (ref 31.5–36.5)
MCV RBC AUTO: 90 FL (ref 78–100)
MICROCYTES BLD QL SMEAR: PRESENT
MONOCYTES # BLD AUTO: 0 10E9/L (ref 0–1.3)
MONOCYTES NFR BLD AUTO: 0 %
NEUTROPHILS # BLD AUTO: 32.9 10E9/L (ref 1.6–8.3)
NEUTROPHILS NFR BLD AUTO: 100 %
OVALOCYTES BLD QL SMEAR: SLIGHT
PLATELET # BLD AUTO: 314 10E9/L (ref 150–450)
PLATELET # BLD EST: ABNORMAL 10*3/UL
POIKILOCYTOSIS BLD QL SMEAR: SLIGHT
POLYCHROMASIA BLD QL SMEAR: SLIGHT
POTASSIUM SERPL-SCNC: 3.1 MMOL/L (ref 3.4–5.3)
PROT SERPL-MCNC: 6.3 G/DL (ref 6.8–8.8)
RBC # BLD AUTO: 3.41 10E12/L (ref 3.8–5.2)
SODIUM SERPL-SCNC: 137 MMOL/L (ref 133–144)
WBC # BLD AUTO: 32.9 10E9/L (ref 4–11)

## 2017-11-24 PROCEDURE — 80053 COMPREHEN METABOLIC PANEL: CPT | Performed by: PHYSICIAN ASSISTANT

## 2017-11-24 PROCEDURE — 25000128 H RX IP 250 OP 636

## 2017-11-24 PROCEDURE — 85025 COMPLETE CBC W/AUTO DIFF WBC: CPT | Performed by: PHYSICIAN ASSISTANT

## 2017-11-24 RX ORDER — MORPHINE SULFATE 30 MG/1
30 TABLET, FILM COATED, EXTENDED RELEASE ORAL EVERY 8 HOURS
Qty: 120 TABLET | Refills: 0 | Status: ON HOLD | OUTPATIENT
Start: 2017-11-30 | End: 2017-12-20

## 2017-11-24 RX ORDER — LIDOCAINE 50 MG/G
OINTMENT TOPICAL
Qty: 350 G | Refills: 1 | Status: SHIPPED | OUTPATIENT
Start: 2017-11-24 | End: 2017-11-24

## 2017-11-24 RX ORDER — OXYCODONE HYDROCHLORIDE 30 MG/1
30 TABLET ORAL EVERY 4 HOURS PRN
Qty: 180 TABLET | Refills: 0 | Status: ON HOLD | OUTPATIENT
Start: 2017-11-24 | End: 2017-12-20

## 2017-11-24 RX ORDER — LIDOCAINE 50 MG/G
OINTMENT TOPICAL
Qty: 350 G | Refills: 1 | Status: SHIPPED | OUTPATIENT
Start: 2017-11-24 | End: 2018-03-14

## 2017-11-24 RX ORDER — HEPARIN SODIUM (PORCINE) LOCK FLUSH IV SOLN 100 UNIT/ML 100 UNIT/ML
5 SOLUTION INTRAVENOUS
Status: COMPLETED | OUTPATIENT
Start: 2017-11-24 | End: 2017-11-24

## 2017-11-24 RX ADMIN — SODIUM CHLORIDE, PRESERVATIVE FREE 5 ML: 5 INJECTION INTRAVENOUS at 06:35

## 2017-11-24 NOTE — NURSING NOTE
"Chief Complaint   Patient presents with     Port Draw     labs drawn from port by rn.     Port accessed with 20 gauge 3/4\" gripper needle and labs drawn by rn.  Port flushed with NS and heparin.  Pt tolerated well.   Basilia Schulz RN      "

## 2017-11-24 NOTE — PROGRESS NOTES
RN Care Coordination Note  Post hospitalization f/u call:  Reviewed discharge summary and appts:                [facility] 11/28/17 11:30 AM [facility] DRAW  [facility] LAB DRAW                 [facility] 11/28/17 12:00 PM  12:00 PM [facility] ONC INFUSION 360  ONCOLOGY INFUSION   17 ATC                 [facility] 11/28/17 5:00 PM [facility] ONC RETURN YANIRA RIVERA                 [facility] 12/01/17 6:30 AM [facility] DRAW  [facility] LAB DRAW                 [facility] 12/05/17 6:30 AM [facility] DRAW  [facility] LAB DRAW                 [facility] 12/07/17 7:00 AM [facility] RETURN PEREZ LE       Left message for pt re: purpose of my call and requested she call me back with any concerns about sx/appts/medications.     Laila Barrett, RN, BSN, OCN  Care Coordinator  [facility]      Addendum:  K of 3.1 noted.   Left detailed message for pt to take KcL 40 meq x 1 po (has home supply of 20 meq tabs)as per electrolyte replacement protocol and will recheck K+ at next lab draw. Asked pt to return my call to confirm receipt of instructions.

## 2017-11-24 NOTE — TELEPHONE ENCOUNTER
Received VM from patient requesting refills of morphine and oxycodone. She also requests a new script for a larger quantity of lidocaine 5% as she is only getting small tubes at this time. Discussed lidocaine amount with pharmacist, who recommended quantity of 350g based on how much patient is using.     Last refill oxycodone: 10/27/2017  Last refills morphine: 11/2/2017  Last office visit: 9/27/2017  Patient to be seen during next hospitalization for chemo -- 12/8.     Will route request to MD for review.     Reviewed [place]  Report.

## 2017-11-27 ENCOUNTER — ONCOLOGY VISIT (OUTPATIENT)
Dept: ONCOLOGY | Facility: CLINIC | Age: 57
End: 2017-11-27
Attending: INTERNAL MEDICINE
Payer: COMMERCIAL

## 2017-11-27 ENCOUNTER — CARE COORDINATION (OUTPATIENT)
Dept: ONCOLOGY | Facility: CLINIC | Age: 57
End: 2017-11-27

## 2017-11-27 VITALS
HEART RATE: 133 BPM | WEIGHT: 182.9 LBS | DIASTOLIC BLOOD PRESSURE: 74 MMHG | OXYGEN SATURATION: 92 % | TEMPERATURE: 98.2 F | HEIGHT: 63 IN | RESPIRATION RATE: 18 BRPM | BODY MASS INDEX: 32.41 KG/M2 | SYSTOLIC BLOOD PRESSURE: 114 MMHG

## 2017-11-27 DIAGNOSIS — C85.10 HIGH GRADE B-CELL LYMPHOMA (H): ICD-10-CM

## 2017-11-27 DIAGNOSIS — Z85.3 PERSONAL HISTORY OF MALIGNANT NEOPLASM OF BREAST: ICD-10-CM

## 2017-11-27 DIAGNOSIS — C73 PAPILLARY CARCINOMA, FOLLICULAR VARIANT (H): Primary | ICD-10-CM

## 2017-11-27 PROCEDURE — 99214 OFFICE O/P EST MOD 30 MIN: CPT | Mod: ZP | Performed by: INTERNAL MEDICINE

## 2017-11-27 PROCEDURE — 99212 OFFICE O/P EST SF 10 MIN: CPT | Mod: ZF

## 2017-11-27 RX ORDER — TRIAMCINOLONE ACETONIDE 0.25 MG/G
OINTMENT TOPICAL PRN
Refills: 3 | COMMUNITY
Start: 2017-02-13

## 2017-11-27 RX ORDER — ALLOPURINOL 300 MG/1
300 TABLET ORAL DAILY
Refills: 0 | Status: ON HOLD | COMMUNITY
Start: 2017-11-02 | End: 2017-12-11

## 2017-11-27 ASSESSMENT — PAIN SCALES - GENERAL: PAINLEVEL: EXTREME PAIN (9)

## 2017-11-27 NOTE — NURSING NOTE
"Oncology Rooming Note    November 27, 2017 4:18 PM   Tisha Arias is a 57 year old female who presents for:    No chief complaint on file.    Initial Vitals: There were no vitals taken for this visit. Estimated body mass index is 32.52 kg/(m^2) as calculated from the following:    Height as of 11/16/17: 1.605 m (5' 3.19\").    Weight as of 11/22/17: 83.8 kg (184 lb 11.2 oz). There is no height or weight on file to calculate BSA.  Extreme Pain (9) Comment: Sub Sternal through to back, epiglottis area, Lower back   No LMP recorded. Patient has had a hysterectomy.  Allergies reviewed: Yes  Medications reviewed: Yes    Medications: MEDICATION REFILLS NEEDED TODAY. Provider was notified.  Pharmacy name entered into [facility]: [id number] - [place], [place] - [address] [address] [place]    Clinical concerns: Tamoxifen. Dr Crawley was NOT notified.    7 minutes for nursing intake (face to face time)     Naima Saavedra LPN                "

## 2017-11-27 NOTE — LETTER
11/27/2017       RE: Tisha Arias  [address] [place] [address]     Dear Colleague,    Thank you for referring your patient, Tisha Arias, to the [facility]. Please see a copy of my visit note below.      REASON FOR VISIT:  F/u breast cancer and thyroid cancer     HISTORY OF PRESENT ILLNESS:  Tisha Arias is a 57-year-old woman, postmenopausal, with a history of T1c N0 M0, infiltrating ductal carcinoma of the right breast with a strong family history of breast cancer. BRCA1 and 2 gene testing negative.    Cancer Treatment History for her breast cancer:  -She was diagnosed with breast cancer in 12/2011.    -She underwent bilateral mastectomy with immediate reconstruction on 01/30/2012 by Dr. Daugherty.  Her final pathology revealed 1.6-cm, infiltrating ductal carcinoma, grade 1/3, no angiolymphatic space invasion, no nipple or skin invasion.  There was associated DCIS, and the margins were free of tumor superiorly, anterior margin by 0.1 cm, and widely free at remaining margins.  This was ER/MO-positive, HER2-negative in the vast majority of cells and non-amplified with a ratio of 1.1.  She had an Oncotype test performed, which revealed a low-risk score of 11.  That put her overall risk of recurrence at about 7% after 5 years of tamoxifen.    - in 02/2012, she was started on Arimidex.    - 12/2012, she had prophylactic hysterectomy and oophorectomy, the pathology of which was benign.    - Her course has been complicated by extreme chest wall pain, myofascial pain, and neuropathic pain.  She has gone through several different clinics and was put on several different medications, which were not doing her any good.  Eventually she was weaned off all the medications and is currently doing only PT with myofascial pain maneuvers with symptomatic improvement.   - 2/2014 switched from arimidex to tamoxifen    - presented to the ER on 9/1/17 for chest pain. CT-PA was  negative for PE but showed right 3cm paraspinal mass and subcarinal adenopathy. PET on 9/6/17 showed the paraspinal mass to be hypermetabolic along with hilar and mediastinal nodes.   - biopsy of T10 vertebral body on 9/15/17 showed high-grade B cell lymphoma   - FNA EUS of subcarinal LN on 9/14 showed mostly necrotic tissue with suspicion for malignancy   - pt is receiving DA-EPOCH under the care of Dr Garima Sauceda    Thyroid cancer history: Incidentally, given that she was having so many symptoms, she underwent a PET scan in 4/2013, which revealed a thyroid nodule which was biopsied and was consistent with papillary carcinoma.  She has undergone resection as well as radioiodine treatment since and has done relatively well from the surgery.  She follows up with Dr. Castro for the same.  She unfortunately required etoh ablation therapy over the thyroid lymph nodes.   She remains under the care of Dr Avelina Castro.       INTERVAL HISTORY: In returning today, Elvin comes in while receiving dose-adjusted EPOCH under the care of Dr. Gayla Sauceda for her high-grade B-cell lymphoma.  She discussed with me that she continues to also see Dr. Eud Benitez for pain management regarding her back pain and her chest wall pain which has been chronic for her and then exacerbated and worsening by her new lymphoma diagnosis and invasion into the T10 vertebral body.       She currently has no fevers or chills.  She does have chest pain as discussed above for which she is on MS Contin 30 mg 3 times daily in conjunction with oxycodone.  She has previously been on a chest wall topical with topical lidocaine and ketamine.  She has not been able to refill this compound and is wondering whether or not this could be refilled, as this provided significant relief for her.  She sees    Dr. Edu Benitez of Palliative Care for management of her pain and inquired about this today.       She continues to work.  She is the  "primary breadwinner in her home.  She also has no insurance if she stops working.  This has made it incredibly difficult for her to get through her chemotherapy.  She feels very exhausted and is oftentimes sleeping on the weekends.  She expressed difficulty with getting through her daily activities like getting to the grocery store both from a cost perspective but also energy.  She has not met with Social Work to discuss options such as [facility] and [facility].      She is trying to keep her spirits up.  She is frustrated in that she remembers being told she had curative intent therapy for lymphoma but has read documented charts from the computer suggesting that her chances of this are only 50% and she feels confused by this and has questions about this today.       Her 10-point review of systems is otherwise negative.          Her 10-point review of systems is otherwise negative.     MEDICATIONS:  reviewed     PHYSICAL EXAMINATION:    VITAL SIGNS:/74  Pulse 133  Temp 98.2  F (36.8  C) (Oral)  Resp 18  Ht 1.605 m (5' 3.19\")  Wt 83 kg (182 lb 14.4 oz)  SpO2 92%  BMI 32.21 kg/m2  Vitals in chart and reviewed  GENERAL:  She is well-appearing, appears fatigued, + alopecia   HEENT:  Exam reveals no icterus or pallor.  Oropharynx is clear with no ulcers or lesions.    NECK:  Supple.  No supraclavicular or cervical lymphadenopathy.  She has a scar from thyroid surgery.     HEART:  Regular rate and rhythm.  S1, S2 clear.  No murmur, gallop or rub.    LUNGS:  Clear to auscultation bilaterally.    ABDOMEN:  Soft, nontender, nondistended, no organomegaly.    BREAST EXAM:  Not performed today.  No supraclavicular or axillary adenopathy appreciated.  Chest wall with reproducible tenderness on palpation  MUSC: tenderness in midspine with associated paraspinal tenderness, no other reproducible tenderness  EXTREMITIES:  She has no lower extremity edema. , ambulating without difficulty  SKIN: no rash.    Labs "   Lab Results   Component Value Date    WBC 32.9 11/24/2017     Lab Results   Component Value Date    RBC 3.41 11/24/2017     Lab Results   Component Value Date    HGB 9.5 11/24/2017     Lab Results   Component Value Date    HCT 30.7 11/24/2017     No components found for: MCT  Lab Results   Component Value Date    MCV 90 11/24/2017     Lab Results   Component Value Date    MCH 27.9 11/24/2017     Lab Results   Component Value Date    MCHC 30.9 11/24/2017     Lab Results   Component Value Date    RDW 21.8 11/24/2017     Lab Results   Component Value Date     11/24/2017       Recent Labs   Lab Test  11/24/17   0642  11/21/17   0343   NA  137  141   POTASSIUM  3.1*  3.6   CHLORIDE  100  100   CO2  27  34*   ANIONGAP  10  7   GLC  173*  154*   BUN  25  27   CR  0.60  0.73   ELMO  7.5*  7.7*     Liver Function Studies -   Recent Labs   Lab Test  11/24/17   0642   PROTTOTAL  6.3*   ALBUMIN  3.1*   BILITOTAL  0.5   ALKPHOS  81   AST  10   ALT  23          ASSESSMENT AND PLAN:      DLBCL: dx Sept 2017 with thoracic paravertebral soft tissue mass and mediastinal nodes. overexpression of c-myc and high mitotic index (double-hit like). Staging LP and BM bx negative. S/p 3 cycles of DA-R-EPOCH.  She is continuing to see Dr Garima Sauceda for mgmt of her lymphoma.  I reassured her that her therapy is curative intent.  I asked her to base her questions regarding prognosis to Dr Sanchez and her team.    Stage I, ER/AR-positive, HER2-negative breast cancer: dx in 2011. Oncotype recurrence score of 11 (7% recurrence risk after 5 years of tamoxifen). S/p bilateral mastectomies and BENJIE/BSO. For endocrine therapy she was on arimidex from 3072-5052, then changed to tamoxifen b/c of arthralgias on Arimidex.     She is holding tamoxifen since starting chemo for lymphoma.   I encouraged her to continue to hold tamoxifen while on chemotherapy.     Papillary thyroid cancer, it is being followed by Dr. Avelina Castro.  She  underwent etoh ablation x3 so far.        Synchronous thyroid cancer and breast cancer.  She is brca 1 and 2 negative.  Additional testing is negative.       Pain:  She is on MS Contin and oxycodone.  I asked palliative care to review her compound topicals to see if this could be of additional benefit for her.      Referred to SW to discuss [facility] and [facility].    Again, thank you for allowing me to participate in the care of your patient.      Sincerely,    Shahla Crawley MD

## 2017-11-27 NOTE — PROGRESS NOTES
Called patient's home/cell phone as Dr Crawley recommended to discuss appointment this afternoon to keep appointment or to move appointment when chemotherapy is completed to follow-up with post breast cancer treatment. Left message to return call to discuss when patient wants to be seen or keep the appointment this afternoon as currently scheduled. Seema Chadwick RN, BSN

## 2017-11-27 NOTE — PROGRESS NOTES
REASON FOR VISIT:  F/u breast cancer and thyroid cancer     HISTORY OF PRESENT ILLNESS:  Tisha Arias is a 57-year-old woman, postmenopausal, with a history of T1c N0 M0, infiltrating ductal carcinoma of the right breast with a strong family history of breast cancer. BRCA1 and 2 gene testing negative.    Cancer Treatment History for her breast cancer:  -She was diagnosed with breast cancer in 12/2011.    -She underwent bilateral mastectomy with immediate reconstruction on 01/30/2012 by Dr. Daugherty.  Her final pathology revealed 1.6-cm, infiltrating ductal carcinoma, grade 1/3, no angiolymphatic space invasion, no nipple or skin invasion.  There was associated DCIS, and the margins were free of tumor superiorly, anterior margin by 0.1 cm, and widely free at remaining margins.  This was ER/UT-positive, HER2-negative in the vast majority of cells and non-amplified with a ratio of 1.1.  She had an Oncotype test performed, which revealed a low-risk score of 11.  That put her overall risk of recurrence at about 7% after 5 years of tamoxifen.    - in 02/2012, she was started on Arimidex.    - 12/2012, she had prophylactic hysterectomy and oophorectomy, the pathology of which was benign.    - Her course has been complicated by extreme chest wall pain, myofascial pain, and neuropathic pain.  She has gone through several different clinics and was put on several different medications, which were not doing her any good.  Eventually she was weaned off all the medications and is currently doing only PT with myofascial pain maneuvers with symptomatic improvement.   - 2/2014 switched from arimidex to tamoxifen    - presented to the ER on 9/1/17 for chest pain. CT-PA was negative for PE but showed right 3cm paraspinal mass and subcarinal adenopathy. PET on 9/6/17 showed the paraspinal mass to be hypermetabolic along with hilar and mediastinal nodes.   - biopsy of T10 vertebral body on 9/15/17 showed high-grade B cell  lymphoma   - FNA EUS of subcarinal LN on 9/14 showed mostly necrotic tissue with suspicion for malignancy   - pt is receiving DA-EPOCH under the care of Dr Garima Sauceda    Thyroid cancer history: Incidentally, given that she was having so many symptoms, she underwent a PET scan in 4/2013, which revealed a thyroid nodule which was biopsied and was consistent with papillary carcinoma.  She has undergone resection as well as radioiodine treatment since and has done relatively well from the surgery.  She follows up with Dr. Castro for the same.  She unfortunately required etoh ablation therapy over the thyroid lymph nodes.   She remains under the care of Dr Avelina Castro.       INTERVAL HISTORY: In returning today, Elvin comes in while receiving dose-adjusted EPOCH under the care of Dr. Gayla Sauceda for her high-grade B-cell lymphoma.  She discussed with me that she continues to also see Dr. Edu Benitez for pain management regarding her back pain and her chest wall pain which has been chronic for her and then exacerbated and worsening by her new lymphoma diagnosis and invasion into the T10 vertebral body.       She currently has no fevers or chills.  She does have chest pain as discussed above for which she is on MS Contin 30 mg 3 times daily in conjunction with oxycodone.  She has previously been on a chest wall topical with topical lidocaine and ketamine.  She has not been able to refill this compound and is wondering whether or not this could be refilled, as this provided significant relief for her.  She sees    Dr. Edu Benitez of Palliative Care for management of her pain and inquired about this today.       She continues to work.  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  She also has no insurance if she stops working.  This has made it incredibly difficult for her to get through her chemotherapy.  She feels very exhausted and is oftentimes sleeping on the weekends.  She expressed  "difficulty with getting through her daily activities like getting to the grocery store both from a cost perspective but also energy.  She has not met with Social Work to discuss options such as [facility] and [facility].      She is trying to keep her spirits up.  She is frustrated in that she remembers being told she had curative intent therapy for lymphoma but has read documented charts from the computer suggesting that her chances of this are only 50% and she feels confused by this and has questions about this today.       Her 10-point review of systems is otherwise negative.          Her 10-point review of systems is otherwise negative.     MEDICATIONS:  reviewed     PHYSICAL EXAMINATION:    VITAL SIGNS:/74  Pulse 133  Temp 98.2  F (36.8  C) (Oral)  Resp 18  Ht 1.605 m (5' 3.19\")  Wt 83 kg (182 lb 14.4 oz)  SpO2 92%  BMI 32.21 kg/m2  Vitals in chart and reviewed  GENERAL:  She is well-appearing, appears fatigued, + alopecia   HEENT:  Exam reveals no icterus or pallor.  Oropharynx is clear with no ulcers or lesions.    NECK:  Supple.  No supraclavicular or cervical lymphadenopathy.  She has a scar from thyroid surgery.     HEART:  Regular rate and rhythm.  S1, S2 clear.  No murmur, gallop or rub.    LUNGS:  Clear to auscultation bilaterally.    ABDOMEN:  Soft, nontender, nondistended, no organomegaly.    BREAST EXAM:  Not performed today.  No supraclavicular or axillary adenopathy appreciated.  Chest wall with reproducible tenderness on palpation  MUSC: tenderness in midspine with associated paraspinal tenderness, no other reproducible tenderness  EXTREMITIES:  She has no lower extremity edema. , ambulating without difficulty  SKIN: no rash.    Labs   Lab Results   Component Value Date    WBC 32.9 11/24/2017     Lab Results   Component Value Date    RBC 3.41 11/24/2017     Lab Results   Component Value Date    HGB 9.5 11/24/2017     Lab Results   Component Value Date    HCT 30.7 11/24/2017 "     No components found for: MCT  Lab Results   Component Value Date    MCV 90 11/24/2017     Lab Results   Component Value Date    MCH 27.9 11/24/2017     Lab Results   Component Value Date    MCHC 30.9 11/24/2017     Lab Results   Component Value Date    RDW 21.8 11/24/2017     Lab Results   Component Value Date     11/24/2017       Recent Labs   Lab Test  11/24/17   0642  11/21/17   0343   NA  137  141   POTASSIUM  3.1*  3.6   CHLORIDE  100  100   CO2  27  34*   ANIONGAP  10  7   GLC  173*  154*   BUN  25  27   CR  0.60  0.73   ELMO  7.5*  7.7*     Liver Function Studies -   Recent Labs   Lab Test  11/24/17   0642   PROTTOTAL  6.3*   ALBUMIN  3.1*   BILITOTAL  0.5   ALKPHOS  81   AST  10   ALT  23          ASSESSMENT AND PLAN:      DLBCL: dx Sept 2017 with thoracic paravertebral soft tissue mass and mediastinal nodes. overexpression of c-myc and high mitotic index (double-hit like). Staging LP and BM bx negative. S/p 3 cycles of DA-R-EPOCH.  She is continuing to see Dr Garima Sauceda for mgmt of her lymphoma.  I reassured her that her therapy is curative intent.  I asked her to base her questions regarding prognosis to Dr Sanchez and her team.    Stage I, ER/WI-positive, HER2-negative breast cancer: dx in 2011. Oncotype recurrence score of 11 (7% recurrence risk after 5 years of tamoxifen). S/p bilateral mastectomies and BENJIE/BSO. For endocrine therapy she was on arimidex from 6747-3993, then changed to tamoxifen b/c of arthralgias on Arimidex.     She is holding tamoxifen since starting chemo for lymphoma.   I encouraged her to continue to hold tamoxifen while on chemotherapy.     Papillary thyroid cancer, it is being followed by Dr. Avelina Castro.  She underwent etoh ablation x3 so far.        Synchronous thyroid cancer and breast cancer.  She is brca 1 and 2 negative.  Additional testing is negative.       Pain:  She is on MS Contin and oxycodone.  I asked palliative care to review her compound  topicals to see if this could be of additional benefit for her.      Referred to SW to discuss [facility] and [facility].

## 2017-11-27 NOTE — MR AVS SNAPSHOT
After Visit Summary   11/27/2017    Tisha Arias    MRN: [id number]           Patient Information     Date Of Birth          1960        Visit Information        Provider Department      11/27/2017 4:00 PM Shahla Crawley MD [facility]        Today's Diagnoses     Papillary carcinoma, follicular variant (H)    -  1    Personal history of malignant neoplasm of breast        High grade B-cell lymphoma (H)           Follow-ups after your visit        Your next 10 appointments already scheduled     Dec 01, 2017  6:30 AM CST   [facility] with [facility]   [facility] Draw ([facility])    [address] [place] [address]  2nd [facility] [id number] [phone]            Dec 05, 2017  6:30 AM CST   [facility]   [facility]    [address] [place] [address]  2nd [facility] [id number] [phone]            Dec 07, 2017  7:00 AM CST   (Arrive by 6:45 AM)   Return Visit with Jessica Cox PA-C   [facility] ([facility])    [address] [place] [id number] [phone]            May 21, 2018  4:20 PM CDT   (Arrive by 4:05 PM)   RETURN ENDOCRINE with Avelina Castro MD   [facility] Endocrinology ([facility])    [address] [place] [address]  3rd [facility] [id number] [phone]              Who to contact     If you have questions or need follow up information about today's clinic visit or your schedule please contact [facility] directly at [phone].  Normal or non-critical lab and imaging results will be communicated to you by MyChart, letter or phone within 4 business days after the clinic has received the results. If you do not hear from us within 7 days, please contact the clinic through MyChart or phone.  "If you have a critical or abnormal lab result, we will notify you by phone as soon as possible.  Submit refill requests through [employer] or call your pharmacy and they will forward the refill request to us. Please allow 3 business days for your refill to be completed.          Additional Information About Your Visit        [employer] [facility] Information     [employer] gives you secure access to your electronic health record. If you see a primary care provider, you can also send messages to your care team and make appointments. If you have questions, please call your primary care clinic.  If you do not have a primary care provider, please call [phone] and they will assist you.        Care EveryWhere ID     This is your Care EveryWhere ID. This could be used by other organizations to access your [facility] medical records  [id number]        Your Vitals Were     Pulse Temperature Respirations Height Pulse Oximetry BMI (Body Mass Index)    133 98.2  F (36.8  C) (Oral) 18 1.605 m (5' 3.19\") 92% 32.21 kg/m2       Blood Pressure from Last 3 Encounters:   11/28/17 122/71   11/28/17 122/71   11/27/17 114/74    Weight from Last 3 Encounters:   11/28/17 84.3 kg (185 lb 12.8 oz)   11/28/17 84.3 kg (185 lb 12.8 oz)   11/27/17 83 kg (182 lb 14.4 oz)              Today, you had the following     No orders found for display         Today's Medication Changes          These changes are accurate as of: 11/27/17 11:59 PM.  If you have any questions, ask your nurse or doctor.               These medicines have changed or have updated prescriptions.        Dose/Directions    aluminum chloride 20 % external solution   Commonly known as:  DRYSOL   This may have changed:    - when to take this  - reasons to take this   Used for:  Rash, Intertrigo        Apply topically At Bedtime   Quantity:  60 mL   Refills:  3       calcitRIOL 0.25 MCG capsule   Commonly known as:  ROCALTROL   This may have changed:  additional instructions   Used for:  " Postsurgical hypothyroidism, Papillary carcinoma, follicular variant (H), Metastasis to cervical lymph node (H)        TAKE 2 CAPSULES BY MOUTH EVERY MORNING AND TAKE 1 CAPSULE BY MOUTH EVERY NIGHT AT BEDTIME   Quantity:  270 capsule   Refills:  3       diazepam 5 MG tablet   Commonly known as:  VALIUM   This may have changed:    - when to take this  - additional instructions   Used for:  Chest wall pain        Dose:  5 mg   Take 1 tablet (5 mg) by mouth 3 times daily as needed For muscle spasms   Quantity:  90 tablet   Refills:  2       docusate sodium 100 MG tablet   Commonly known as:  COLACE   This may have changed:    - when to take this  - reasons to take this   Used for:  Acute constipation        Dose:  100 mg   Take 100 mg by mouth daily   Quantity:  30 tablet   Refills:  0       levothyroxine 112 MCG tablet   Commonly known as:  SYNTHROID/LEVOTHROID   This may have changed:    - how much to take  - additional instructions   Used for:  Postsurgical hypothyroidism, Papillary carcinoma, follicular variant (H), Postsurgical hypoparathyroidism (H), Thyroid cancer (H)        Mon-Sat: (2 tabs daily) Sunday: 3 tabs   Quantity:  189 tablet   Refills:  3       methocarbamol 750 MG tablet   Commonly known as:  ROBAXIN   This may have changed:    - when to take this  - additional instructions   Used for:  Myofascial pain        Dose:  750 mg   Take 1 tablet (750 mg) by mouth 4 times daily as needed . Ok to take a 5th Robaxin on very severe days.   Quantity:  360 tablet   Refills:  1       mometasone 110 MCG/INH inhaler   Commonly known as:  ASMANEX 30 METERED DOSES   This may have changed:    - when to take this  - reasons to take this   Used for:  Intermittent asthma, uncomplicated        Dose:  1 puff   Inhale 1 puff into the lungs daily   Quantity:  3 Inhaler   Refills:  3       montelukast 10 MG tablet   Commonly known as:  SINGULAIR   This may have changed:  how much to take   Used for:  Idiopathic mast cell  activation syndrome (H)        Dose:  10 mg   Take 1 tablet (10 mg) by mouth 2 times daily   Quantity:  60 tablet   Refills:  0       polyethylene glycol powder   Commonly known as:  MIRALAX   This may have changed:    - when to take this  - reasons to take this   Used for:  Acute constipation        Dose:  1 capful   Take 17 g (1 capful) by mouth daily   Quantity:  510 g   Refills:  0                Primary Care Provider Office Phone # Fax #    Shahla Raul Crawley [phone]       [address] [place] [id number]        Equal Access to Services     [facility]: Hadii sacha marley hadasho Soomaali, waaxda luqadaha, qaybta kaalmada daina, ranjan yoder . So [facility] [phone].    ATENCIÓN: Si habla español, tiene a stiles disposición servicios gratuitos de asistencia lingüística. [facility] [phone].    We comply with applicable federal civil rights laws and [place] laws. We do not discriminate on the basis of race, color, national origin, age, disability, sex, sexual orientation, or gender identity.            Thank you!     Thank you for choosing [facility]  for your care. Our goal is always to provide you with excellent care. Hearing back from our patients is one way we can continue to improve our services. Please take a few minutes to complete the written survey that you may receive in the mail after your visit with us. Thank you!             Your Updated Medication List - Protect others around you: Learn how to safely use, store and throw away your medicines at www.disposemymeds.org.          This list is accurate as of: 11/27/17 11:59 PM.  Always use your most recent med list.                   Brand Name Dispense Instructions for use Diagnosis    acyclovir 400 MG tablet    ZOVIRAX    60 tablet    Take 1 tablet (400 mg) by mouth 2 times daily    High grade B-cell lymphoma (H)       allopurinol 300 MG tablet    ZYLOPRIM     Take 300 mg  by mouth daily        aluminum chloride 20 % external solution    DRYSOL    60 mL    Apply topically At Bedtime    Rash, Intertrigo       AZMACORT IN      Inhale 2 puffs into the lungs as needed        bisacodyl 5 MG EC tablet     30 tablet    Take 3 tablets (15 mg) by mouth daily as needed for constipation    Constipation, unspecified constipation type       calcitRIOL 0.25 MCG capsule    ROCALTROL    270 capsule    TAKE 2 CAPSULES BY MOUTH EVERY MORNING AND TAKE 1 CAPSULE BY MOUTH EVERY NIGHT AT BEDTIME    Postsurgical hypothyroidism, Papillary carcinoma, follicular variant (H), Metastasis to cervical lymph node (H)       CALCIUM CITRATE +D 315-250 MG-UNIT Tabs per tablet   Generic drug:  calcium citrate-vitamin D     120 tablet    Take 2 tablets by mouth twice a week        celecoxib 200 MG capsule    celeBREX    56 capsule    TAKE ONE CAPSULE BY MOUTH TWICE DAILY    Chest wall pain       cetirizine 10 MG tablet    ZYRTEC ALLERGY    30 tablet    Take 1 tablet (10 mg) by mouth 3 times daily On hold for lab test.    High grade B-cell lymphoma (H)       COMPOUND CONTAINING CONTROLLED SUBSTANCE - PHARMACY TO MIX COMPOUNDED MEDICATION    CMPD RX    120 g    Apply small amount to affected area two times daily.    Neoplasm related pain       cromolyn 4 % ophthalmic solution    OPTICROM    10 mL    Place 1 drop into both eyes 4 times daily    Idiopathic mast cell activation syndrome (H)       cyclobenzaprine 10 MG tablet    FLEXERIL    90 tablet    Take 1 tablet (10 mg) by mouth 3 times daily as needed    High grade B-cell lymphoma (H)       dexamethasone 4 MG tablet    DECADRON    4 tablet    Take 2 tablets (8 mg) by mouth daily for 2 days On Wed 11/22 and Thursday 11/23    High grade B-cell lymphoma (H)       diazepam 5 MG tablet    VALIUM    90 tablet    Take 1 tablet (5 mg) by mouth 3 times daily as needed For muscle spasms    Chest wall pain       diclofenac 1 % Gel topical gel    VOLTAREN    100 g    Apply affected  area two times daily PRN using enclosed dosing card.    Myofascial pain       diphenhydrAMINE 50 MG capsule    BENADRYL    56 capsule    Take 1 capsule (50 mg) by mouth nightly as needed for itching or sleep    High grade B-cell lymphoma (H)       docusate sodium 100 MG tablet    COLACE    30 tablet    Take 100 mg by mouth daily    Acute constipation       EPINEPHrine 0.3 MG/0.3ML injection 2-pack    EPIPEN 2-CARIDAD    0.6 mL    Inject 0.3 mLs (0.3 mg) into the muscle once as needed for anaphylaxis        ferrous sulfate 325 (65 FE) MG tablet    IRON    90 tablet    Take 1 tablet (325 mg) by mouth 3 times daily (with meals)    Status post bariatric surgery       fluconazole 200 MG tablet    DIFLUCAN    30 tablet    Take 1 tablet (200 mg) by mouth daily    High grade B-cell lymphoma (H)       furosemide 20 MG tablet    LASIX    60 tablet    Take 1 tablet (20 mg) by mouth 2 times daily    Localized edema       hydrochlorothiazide 12.5 MG capsule    MICROZIDE    90 capsule    Take 1 capsule (12.5 mg) by mouth daily    Hypocalcemia       levofloxacin 250 MG tablet    LEVAQUIN    30 tablet    Take 1 tablet (250 mg) by mouth daily    High grade B-cell lymphoma (H)       levothyroxine 112 MCG tablet    SYNTHROID/LEVOTHROID    189 tablet    Mon-Sat: (2 tabs daily) Sunday: 3 tabs    Postsurgical hypothyroidism, Papillary carcinoma, follicular variant (H), Postsurgical hypoparathyroidism (H), Thyroid cancer (H)       lidocaine 5 % ointment    XYLOCAINE    350 g    Apply quarter size amount to chest and back up to 3 times daily as needed for pain.    Chest wall pain       lidocaine visc 2% 2.5mL/5mL & maalox/mylanta w/ simeth 2.5mL/5mL & diphenhydrAMINE 5mg/5mL Susp suspension    [facility]    360 mL    Swish and spit 10 mLs in mouth every 6 hours as needed for mouth sores    Stomatitis and mucositis, High grade B-cell lymphoma (H)       lidocaine-prilocaine cream    EMLA    30 g    Apply topically as needed (port  access pain.)    Neoplasm related pain, Chest wall pain       methocarbamol 750 MG tablet    ROBAXIN    360 tablet    Take 1 tablet (750 mg) by mouth 4 times daily as needed . Ok to take a 5th Robaxin on very severe days.    Myofascial pain       mometasone 110 MCG/INH inhaler    ASMANEX 30 METERED DOSES    3 Inhaler    Inhale 1 puff into the lungs daily    Intermittent asthma, uncomplicated       montelukast 10 MG tablet    SINGULAIR    60 tablet    Take 1 tablet (10 mg) by mouth 2 times daily    Idiopathic mast cell activation syndrome (H)       * MS CONTIN PO      Take 60 mg by mouth daily Takes at 8pm        * morphine 30 MG 12 hr tablet    MS CONTIN    120 tablet    Take 1 tablet (30 mg) by mouth every 8 hours . Take an addition pill at night such that your evening dose is 60mg    Neoplasm related pain       NASALCROM 5.2 MG/ACT Aers Inhaler   Generic drug:  cromolyn sodium     1 Bottle    SPRAY ONE SPRAY( 1 ML) IN NOSTRIL DAILY    Mast cell disease, systemic       ondansetron 8 MG ODT tab    ZOFRAN-ODT    30 tablet    Take 1 tablet (8 mg) by mouth every 8 hours as needed    High grade B-cell lymphoma (H), Nausea and vomiting, intractability of vomiting not specified, unspecified vomiting type       * order for DME     2 Units    Equipment being ordered: compression stockings. 20-30 mm or 30 - 40 mm as patient can tolerate. Physical therapy to determine if they should be above or below the knee.    Venous stasis       * order for DME     2 Device    Equipment being ordered: compression bra    Malignant neoplasm of right female breast, unspecified site of breast       * order for DME     1 Units    Compression stockings, medium grade, please measure and fit. Please dispense a pair.    Peripheral edema       oxyCODONE IR 30 MG tablet    ROXICODONE    180 tablet    Take 1 tablet (30 mg) by mouth every 4 hours as needed for moderate to severe pain    High grade B-cell lymphoma (H)       polyethylene glycol powder     MIRALAX    510 g    Take 17 g (1 capful) by mouth daily    Acute constipation       potassium chloride SA 20 MEQ CR tablet    KLOR-CON    90 tablet    Take 1 tablet (20 mEq) by mouth daily    Hypokalemia       PROBIOTIC DAILY PO      Take 1 capsule by mouth daily Lacto acid bifidobacterium    Breast cancer, unspecified laterality, Thyroid cancer (H), Chronic arthralgias of knees and hips, unspecified laterality       prochlorperazine 10 MG tablet    COMPAZINE    60 tablet    Take 1 tablet (10 mg) by mouth every 6 hours as needed for nausea or vomiting    High grade B-cell lymphoma (H), Nausea       ranitidine 75 MG tablet    ZANTAC    270 tablet    Take 1 tablet (75 mg) by mouth 3 times daily    Mast cell disease, systemic       senna-docusate 8.6-50 MG per tablet    SENOKOT-S;PERICOLACE    60 tablet    Take 1-2 tablets by mouth 2 times daily as needed for constipation    Acute constipation       SUMAtriptan 50 MG tablet    IMITREX    9 tablet    Take 1 tablet (50 mg) by mouth at onset of headache for migraine (may repeat in 2 hours as needed, max 2 tablets daily)    Migraine without status migrainosus, not intractable, unspecified migraine type       triamcinolone 0.025 % ointment    KENALOG     Apply topically as needed        TUMS 500 MG chewable tablet   Generic drug:  calcium carbonate     180 chew tab    Take 2 tablets (1,000 mg) by mouth nightly as needed for other (cramps)        * UNABLE TO FIND      3 tablets 3 times daily MEDICATION NAME: calcium D-Glucarate  3 caps contain 180mg of elemental calcium.        * UNABLE TO FIND      2 tablets 3 times daily MEDICATION NAME: Natural D-Hist (on hold during chemo)    Breast cancer, unspecified laterality, Papillary carcinoma, follicular variant (H), Chronic musculoskeletal pain       * UNABLE TO FIND      MEDICATION NAME: Tumeric 3 capsules daily (on hold during chemo)        * UNABLE TO FIND      Take 2 capsules by mouth 3 times daily Muscle Mag. 2 caps  contain B1 20mg, B2 20mg, B6 10mg, magesium 20mg, manganese 2mg.        * UNABLE TO FIND      2 tablets 3 times daily MEDICATION NAME: Digestzymes    Thyroid cancer (H), Postsurgical hypothyroidism, Postsurgical hypoparathyroidism (H)       * UNABLE TO FIND      1 tablet daily MEDICATION NAME: Pure Encapsulations    Thyroid cancer (H), Postsurgical hypothyroidism, Postsurgical hypoparathyroidism (H)       VENTOLIN  (90 BASE) MCG/ACT Inhaler   Generic drug:  albuterol     18 g    INHALE 2 PUFFS INTO THE LUNGS EVERY 4 HOURS AS NEEDED FOR SHORTNESS OF BREATH OR DIFFICULT BREATHING OR WHEEZING    Mild intermittent asthma without complication       vitamin D3 2000 UNITS Caps     60 capsule    Take 5,000 Units by mouth daily Takes 2 tabs daily    Thyroid cancer (H), Postsurgical hypothyroidism, Papillary carcinoma, follicular variant (H), Postsurgical hypoparathyroidism (H)       * Notice:  This list has 11 medication(s) that are the same as other medications prescribed for you. Read the directions carefully, and ask your doctor or other care provider to review them with you.

## 2017-11-28 ENCOUNTER — OFFICE VISIT (OUTPATIENT)
Dept: TRANSPLANT | Facility: CLINIC | Age: 57
End: 2017-11-28
Payer: COMMERCIAL

## 2017-11-28 ENCOUNTER — INFUSION THERAPY VISIT (OUTPATIENT)
Dept: ONCOLOGY | Facility: CLINIC | Age: 57
End: 2017-11-28
Payer: COMMERCIAL

## 2017-11-28 ENCOUNTER — APPOINTMENT (OUTPATIENT)
Dept: LAB | Facility: CLINIC | Age: 57
End: 2017-11-28
Attending: INTERNAL MEDICINE
Payer: COMMERCIAL

## 2017-11-28 VITALS
SYSTOLIC BLOOD PRESSURE: 122 MMHG | BODY MASS INDEX: 32.72 KG/M2 | OXYGEN SATURATION: 99 % | HEART RATE: 104 BPM | WEIGHT: 185.8 LBS | DIASTOLIC BLOOD PRESSURE: 71 MMHG | TEMPERATURE: 97.7 F | RESPIRATION RATE: 16 BRPM

## 2017-11-28 VITALS
BODY MASS INDEX: 32.72 KG/M2 | WEIGHT: 185.8 LBS | OXYGEN SATURATION: 99 % | HEART RATE: 104 BPM | DIASTOLIC BLOOD PRESSURE: 71 MMHG | RESPIRATION RATE: 16 BRPM | SYSTOLIC BLOOD PRESSURE: 122 MMHG | TEMPERATURE: 97.7 F

## 2017-11-28 DIAGNOSIS — C85.10 HIGH GRADE B-CELL LYMPHOMA (H): Primary | ICD-10-CM

## 2017-11-28 DIAGNOSIS — C83.38 DIFFUSE LARGE B-CELL LYMPHOMA OF LYMPH NODES OF MULTIPLE REGIONS (H): Primary | ICD-10-CM

## 2017-11-28 DIAGNOSIS — C85.10 HIGH GRADE B-CELL LYMPHOMA (H): ICD-10-CM

## 2017-11-28 DIAGNOSIS — C83.33 DIFFUSE LARGE B-CELL LYMPHOMA OF INTRA-ABDOMINAL LYMPH NODES (H): ICD-10-CM

## 2017-11-28 LAB
ABO + RH BLD: NORMAL
ABO + RH BLD: NORMAL
ALBUMIN SERPL-MCNC: 3.1 G/DL (ref 3.4–5)
ALP SERPL-CCNC: 63 U/L (ref 40–150)
ALT SERPL W P-5'-P-CCNC: 30 U/L (ref 0–50)
ANION GAP SERPL CALCULATED.3IONS-SCNC: 8 MMOL/L (ref 3–14)
ANISOCYTOSIS BLD QL SMEAR: ABNORMAL
AST SERPL W P-5'-P-CCNC: 21 U/L (ref 0–45)
BASOPHILS # BLD AUTO: 0.1 10E9/L (ref 0–0.2)
BASOPHILS NFR BLD AUTO: 3.6 %
BILIRUB SERPL-MCNC: 0.3 MG/DL (ref 0.2–1.3)
BLD GP AB SCN SERPL QL: NORMAL
BLD PROD TYP BPU: NORMAL
BLD UNIT ID BPU: 0
BLD UNIT ID BPU: 0
BLOOD BANK CMNT PATIENT-IMP: NORMAL
BLOOD PRODUCT CODE: NORMAL
BLOOD PRODUCT CODE: NORMAL
BPU ID: NORMAL
BPU ID: NORMAL
BUN SERPL-MCNC: 18 MG/DL (ref 7–30)
CALCIUM SERPL-MCNC: 8.3 MG/DL (ref 8.5–10.1)
CHLORIDE SERPL-SCNC: 102 MMOL/L (ref 94–109)
CO2 SERPL-SCNC: 30 MMOL/L (ref 20–32)
CREAT SERPL-MCNC: 0.89 MG/DL (ref 0.52–1.04)
DACRYOCYTES BLD QL SMEAR: SLIGHT
DIFFERENTIAL METHOD BLD: ABNORMAL
EOSINOPHIL # BLD AUTO: 0 10E9/L (ref 0–0.7)
EOSINOPHIL NFR BLD AUTO: 0.9 %
ERYTHROCYTE [DISTWIDTH] IN BLOOD BY AUTOMATED COUNT: 21.7 % (ref 10–15)
GFR SERPL CREATININE-BSD FRML MDRD: 65 ML/MIN/1.7M2
GLUCOSE SERPL-MCNC: 101 MG/DL (ref 70–99)
HCT VFR BLD AUTO: 28.6 % (ref 35–47)
HGB BLD-MCNC: 9 G/DL (ref 11.7–15.7)
LYMPHOCYTES # BLD AUTO: 0.5 10E9/L (ref 0.8–5.3)
LYMPHOCYTES NFR BLD AUTO: 26.1 %
MCH RBC QN AUTO: 28.3 PG (ref 26.5–33)
MCHC RBC AUTO-ENTMCNC: 31.5 G/DL (ref 31.5–36.5)
MCV RBC AUTO: 90 FL (ref 78–100)
METAMYELOCYTES # BLD: 0.1 10E9/L
METAMYELOCYTES NFR BLD MANUAL: 3.6 %
MICROCYTES BLD QL SMEAR: PRESENT
MONOCYTES # BLD AUTO: 0.5 10E9/L (ref 0–1.3)
MONOCYTES NFR BLD AUTO: 21.6 %
MYELOCYTES # BLD: 0.1 10E9/L
MYELOCYTES NFR BLD MANUAL: 6.3 %
NEUTROPHILS # BLD AUTO: 0.8 10E9/L (ref 1.6–8.3)
NEUTROPHILS NFR BLD AUTO: 37.9 %
NRBC # BLD AUTO: 0.5 10*3/UL
NRBC BLD AUTO-RTO: 24 /100
NUM BPU REQUESTED: 2
OVALOCYTES BLD QL SMEAR: SLIGHT
PLATELET # BLD AUTO: 110 10E9/L (ref 150–450)
PLATELET # BLD EST: ABNORMAL 10*3/UL
POTASSIUM SERPL-SCNC: 4.4 MMOL/L (ref 3.4–5.3)
PROT SERPL-MCNC: 6.1 G/DL (ref 6.8–8.8)
RBC # BLD AUTO: 3.18 10E12/L (ref 3.8–5.2)
SODIUM SERPL-SCNC: 139 MMOL/L (ref 133–144)
SPECIMEN EXP DATE BLD: NORMAL
TRANSFUSION STATUS PATIENT QL: NORMAL
WBC # BLD AUTO: 2.1 10E9/L (ref 4–11)

## 2017-11-28 PROCEDURE — 85025 COMPLETE CBC W/AUTO DIFF WBC: CPT | Performed by: PHYSICIAN ASSISTANT

## 2017-11-28 PROCEDURE — 86923 COMPATIBILITY TEST ELECTRIC: CPT

## 2017-11-28 PROCEDURE — 86900 BLOOD TYPING SEROLOGIC ABO: CPT

## 2017-11-28 PROCEDURE — 25000128 H RX IP 250 OP 636: Mod: ZF

## 2017-11-28 PROCEDURE — 86901 BLOOD TYPING SEROLOGIC RH(D): CPT

## 2017-11-28 PROCEDURE — 99212 OFFICE O/P EST SF 10 MIN: CPT | Mod: ZF

## 2017-11-28 PROCEDURE — 36591 DRAW BLOOD OFF VENOUS DEVICE: CPT

## 2017-11-28 PROCEDURE — 86850 RBC ANTIBODY SCREEN: CPT

## 2017-11-28 PROCEDURE — 80053 COMPREHEN METABOLIC PANEL: CPT | Performed by: PHYSICIAN ASSISTANT

## 2017-11-28 RX ORDER — DIPHENHYDRAMINE HCL 25 MG
50 CAPSULE ORAL ONCE
Status: CANCELLED
Start: 2017-12-07

## 2017-11-28 RX ORDER — AMOXICILLIN 250 MG
2 CAPSULE ORAL 2 TIMES DAILY
Status: CANCELLED | OUTPATIENT
Start: 2017-12-07

## 2017-11-28 RX ORDER — LORAZEPAM 0.5 MG/1
.5-1 TABLET ORAL EVERY 6 HOURS PRN
Status: CANCELLED
Start: 2017-12-07

## 2017-11-28 RX ORDER — ALBUTEROL SULFATE 0.83 MG/ML
2.5 SOLUTION RESPIRATORY (INHALATION)
Status: CANCELLED | OUTPATIENT
Start: 2017-12-07

## 2017-11-28 RX ORDER — ALLOPURINOL 300 MG/1
300 TABLET ORAL DAILY
Status: CANCELLED | OUTPATIENT
Start: 2017-12-07

## 2017-11-28 RX ORDER — ACETAMINOPHEN 325 MG/1
650 TABLET ORAL ONCE
Status: CANCELLED
Start: 2017-12-07

## 2017-11-28 RX ORDER — ONDANSETRON 8 MG/1
16 TABLET, FILM COATED ORAL EVERY 24 HOURS
Status: CANCELLED
Start: 2017-12-08

## 2017-11-28 RX ORDER — SODIUM CHLORIDE 9 MG/ML
1000 INJECTION, SOLUTION INTRAVENOUS CONTINUOUS PRN
Status: CANCELLED
Start: 2017-12-07

## 2017-11-28 RX ORDER — PROCHLORPERAZINE MALEATE 10 MG
10 TABLET ORAL EVERY 6 HOURS PRN
Status: CANCELLED
Start: 2017-12-07

## 2017-11-28 RX ORDER — HEPARIN SODIUM (PORCINE) LOCK FLUSH IV SOLN 100 UNIT/ML 100 UNIT/ML
500 SOLUTION INTRAVENOUS ONCE
Status: COMPLETED | OUTPATIENT
Start: 2017-11-28 | End: 2017-11-28

## 2017-11-28 RX ORDER — HEPARIN SODIUM (PORCINE) LOCK FLUSH IV SOLN 100 UNIT/ML 100 UNIT/ML
5 SOLUTION INTRAVENOUS ONCE
Status: COMPLETED | OUTPATIENT
Start: 2017-11-28 | End: 2017-11-28

## 2017-11-28 RX ORDER — ALBUTEROL SULFATE 90 UG/1
1-2 AEROSOL, METERED RESPIRATORY (INHALATION)
Status: CANCELLED
Start: 2017-12-07

## 2017-11-28 RX ORDER — LORAZEPAM 2 MG/ML
.5-1 INJECTION INTRAMUSCULAR EVERY 6 HOURS PRN
Status: CANCELLED | OUTPATIENT
Start: 2017-12-07

## 2017-11-28 RX ORDER — DEXAMETHASONE 4 MG/1
8 TABLET ORAL DAILY
Status: CANCELLED
Start: 2017-12-13

## 2017-11-28 RX ORDER — MEPERIDINE HYDROCHLORIDE 25 MG/ML
25 INJECTION INTRAMUSCULAR; INTRAVENOUS; SUBCUTANEOUS EVERY 30 MIN PRN
Status: CANCELLED | OUTPATIENT
Start: 2017-12-07

## 2017-11-28 RX ORDER — DIPHENHYDRAMINE HYDROCHLORIDE 50 MG/ML
50 INJECTION INTRAMUSCULAR; INTRAVENOUS
Status: CANCELLED
Start: 2017-12-07

## 2017-11-28 RX ORDER — EPINEPHRINE 1 MG/ML
0.3 INJECTION, SOLUTION, CONCENTRATE INTRAVENOUS EVERY 5 MIN PRN
Status: CANCELLED | OUTPATIENT
Start: 2017-12-07

## 2017-11-28 RX ORDER — METHYLPREDNISOLONE SODIUM SUCCINATE 125 MG/2ML
125 INJECTION, POWDER, LYOPHILIZED, FOR SOLUTION INTRAMUSCULAR; INTRAVENOUS
Status: CANCELLED
Start: 2017-12-07

## 2017-11-28 RX ADMIN — SODIUM CHLORIDE, PRESERVATIVE FREE 5 ML: 5 INJECTION INTRAVENOUS at 06:38

## 2017-11-28 RX ADMIN — SODIUM CHLORIDE, PRESERVATIVE FREE 500 UNITS: 5 INJECTION INTRAVENOUS at 13:00

## 2017-11-28 ASSESSMENT — PAIN SCALES - GENERAL
PAINLEVEL: EXTREME PAIN (8)
PAINLEVEL: EXTREME PAIN (8)

## 2017-11-28 NOTE — LETTER
"11/28/2017       RE: Tisha Arias  [address] [place] [address]     Dear Colleague,    Thank you for referring your patient, Tisha Arias, to the [facility] at [facility]. Please see a copy of my visit note below.    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 She is currently undergoing treatment with DA-R-EPOCH. Her past medical history is significant for breast cancer in 2011 s/p bilateral mastectomies and BENJIE/BSO up until recently on Tamoxifen, papillary thyroid cancer s/p total thyroidectomy, chronic chest wall pain, and asthma. Briefly, her oncologic history is as follows:    She presented in 8/2017 with dramatically worse chest and back pain. CT 9/1/17 showed lytic lesion right 10th rib extending to right T9-10 neural foramen with vertebral body invasion. There was associated conglomerate of lymphadenopathy around the mid T-spine. She had a CT guided biopsy showing B-cell high grade lymphoma. Pathology showed \"The morphology shows a population of large cells, diffuse lymphoid infiltrate. The cells are atypical with abundant mitotic and apoptotic activity and single cell necrosis. Tumor is CD45 and CD79a positive and focally weakly CD30 positive. The other stains revealed positivity for CD20, BCL6, BCL2 and MUM1, and CD10 and CD21 negative. The CD5 and CD3 highlighted background T-cells.  MYC expression was equivocal with approximately 40% nuclei staining.  Ki-67 proliferative index is greater than 90-95%. The immunohistochemistry is suggestive of non-GCB immunophenotype of diffuse large B-cell lymphoma. The cytogenetics did not show rearrangement of the BCL2 or c-MYC. There is the presence of BCL6 rearrangement in 78% of cells and gains of MYC and BCL2, but " "no amplifications.\"     Staging LP and BMBx were negative for lymphoma.   She started DA-REPOCH 10/6/17. She did well with chemo other than rigors during CIVI.  Post cycle 1 complicated by mucositis and worsening of chronic LE peripheral edema.   Cycle 2 on 10/27/17. Due to a rise in her LD and uric acid levels on that admission day, she underwent a CT scan which showed response to therapy with improvement in her mediastinal lymphadenopathy and right chest wall mass.  Cycle 3 on 11/16        Interval History:  She reports that overall since hospital discharge she has been doing okay. She reports feeling tired and being the 'throw-up queen' but denies any emesis. States she is taking zofran but only taking a half-dose. Also reports chest pain, pelvic girdle pain, and leg pain. Pt reports that she continues to work. Her  was on the phone during the exam. History taking was at times limited by the pt dosing off.         Current Outpatient Prescriptions   Medication Sig Dispense Refill     allopurinol (ZYLOPRIM) 300 MG tablet Take 300 mg by mouth daily  0     triamcinolone (KENALOG) 0.025 % ointment Apply topically as needed  3     [START ON 11/30/2017] morphine (MS CONTIN) 30 MG 12 hr tablet Take 1 tablet (30 mg) by mouth every 8 hours . Take an addition pill at night such that your evening dose is 60mg 120 tablet 0     oxyCODONE IR (ROXICODONE) 30 MG tablet Take 1 tablet (30 mg) by mouth every 4 hours as needed for moderate to severe pain 180 tablet 0     lidocaine (XYLOCAINE) 5 % ointment Apply quarter size amount to chest and back up to 3 times daily as needed for pain. 350 g 1     montelukast (SINGULAIR) 10 MG tablet Take 1 tablet (10 mg) by mouth 2 times daily (Patient taking differently: Take 20 mg by mouth 2 times daily ) 60 tablet 0     ondansetron (ZOFRAN-ODT) 8 MG ODT tab Take 1 tablet (8 mg) by mouth every 8 hours as needed 30 tablet 1     fluconazole (DIFLUCAN) 200 MG tablet Take 1 tablet (200 mg) by " mouth daily 30 tablet 0     acyclovir (ZOVIRAX) 400 MG tablet Take 1 tablet (400 mg) by mouth 2 times daily 60 tablet 0     dexamethasone (DECADRON) 4 MG tablet Take 2 tablets (8 mg) by mouth daily for 2 days On Wed 11/22 and Thursday 11/23 4 tablet 0     furosemide (LASIX) 20 MG tablet Take 1 tablet (20 mg) by mouth 2 times daily 60 tablet 0     levofloxacin (LEVAQUIN) 250 MG tablet Take 1 tablet (250 mg) by mouth daily 30 tablet 0     magic mouthwash suspension (diphenhydrAMINE, lidocaine, aluminum-magnesium & simethicone) Swish and spit 10 mLs in mouth every 6 hours as needed for mouth sores 360 mL 1     diclofenac (VOLTAREN) 1 % GEL topical gel Apply affected area two times daily PRN using enclosed dosing card. 100 g 0     prochlorperazine (COMPAZINE) 10 MG tablet Take 1 tablet (10 mg) by mouth every 6 hours as needed for nausea or vomiting 60 tablet 3     cetirizine (ZYRTEC ALLERGY) 10 MG tablet Take 1 tablet (10 mg) by mouth 3 times daily On hold for lab test. 30 tablet 0     ferrous sulfate (IRON) 325 (65 FE) MG tablet Take 1 tablet (325 mg) by mouth 3 times daily (with meals) 90 tablet 0     bisacodyl (DULCOLAX) 5 MG EC tablet Take 3 tablets (15 mg) by mouth daily as needed for constipation 30 tablet 0     docusate sodium (COLACE) 100 MG tablet Take 100 mg by mouth daily (Patient taking differently: Take 100 mg by mouth daily as needed ) 30 tablet 0     polyethylene glycol (MIRALAX) powder Take 17 g (1 capful) by mouth daily (Patient taking differently: Take 1 capful by mouth daily as needed ) 510 g 0     cyclobenzaprine (FLEXERIL) 10 MG tablet Take 1 tablet (10 mg) by mouth 3 times daily as needed 90 tablet 0     senna-docusate (SENOKOT-S;PERICOLACE) 8.6-50 MG per tablet Take 1-2 tablets by mouth 2 times daily as needed for constipation 60 tablet 0     UNABLE TO FIND Take 2 capsules by mouth 3 times daily Muscle Mag. 2 caps contain B1 20mg, B2 20mg, B6 10mg, magesium 20mg, manganese 2mg.       Morphine  Sulfate (MS CONTIN PO) Take 60 mg by mouth daily Takes at 8pm       lidocaine-prilocaine (EMLA) cream Apply topically as needed (port access pain.) 30 g 1     SUMAtriptan (IMITREX) 50 MG tablet Take 1 tablet (50 mg) by mouth at onset of headache for migraine (may repeat in 2 hours as needed, max 2 tablets daily) 9 tablet 3     calcitRIOL (ROCALTROL) 0.25 MCG capsule TAKE 2 CAPSULES BY MOUTH EVERY MORNING AND TAKE 1 CAPSULE BY MOUTH EVERY NIGHT AT BEDTIME (Patient taking differently: TAKE 2 CAPSULES BY MOUTH EVERY MORNING AND TAKE 1 CAPSULE BY MOUTH at noon) 270 capsule 3     celecoxib (CELEBREX) 200 MG capsule TAKE ONE CAPSULE BY MOUTH TWICE DAILY  56 capsule 0     order for DME Compression stockings, medium grade, please measure and fit. Please dispense a pair. 1 Units 0     diphenhydrAMINE (BENADRYL) 50 MG capsule Take 1 capsule (50 mg) by mouth nightly as needed for itching or sleep (Patient not taking: Reported on 11/16/2017) 56 capsule      methocarbamol (ROBAXIN) 750 MG tablet Take 1 tablet (750 mg) by mouth 4 times daily as needed . Ok to take a 5th Robaxin on very severe days. (Patient taking differently: Take 750 mg by mouth 4 times daily . Ok to take a 5th Robaxin on very severe days.) 360 tablet 1     COMPOUND CONTAINING CONTROLLED SUBSTANCE (CMPD RX) - PHARMACY TO MIX COMPOUNDED MEDICATION Apply small amount to affected area two times daily. (Patient not taking: Reported on 11/27/2017) 120 g 6     diazepam (VALIUM) 5 MG tablet Take 1 tablet (5 mg) by mouth 3 times daily as needed For muscle spasms (Patient taking differently: Take 5 mg by mouth 3 times daily For muscle spasms) 90 tablet 2     levothyroxine (SYNTHROID/LEVOTHROID) 112 MCG tablet Mon-Sat: (2 tabs daily) Sunday: 3 tabs (Patient taking differently: 112 mcg Mon-Sat: (2 tabs daily) Sunday: 3 tabs) 189 tablet 3     hydrochlorothiazide (MICROZIDE) 12.5 MG capsule Take 1 capsule (12.5 mg) by mouth daily 90 capsule 2     EPINEPHrine (EPIPEN 2-CARIDAD)  0.3 MG/0.3ML injection Inject 0.3 mLs (0.3 mg) into the muscle once as needed for anaphylaxis 0.6 mL 3     potassium chloride SA (POTASSIUM CHLORIDE) 20 MEQ CR tablet Take 1 tablet (20 mEq) by mouth daily 90 tablet 3     VENTOLIN  (90 BASE) MCG/ACT Inhaler INHALE 2 PUFFS INTO THE LUNGS EVERY 4 HOURS AS NEEDED FOR SHORTNESS OF BREATH OR DIFFICULT BREATHING OR WHEEZING 18 g 3     cromolyn (OPTICROM) 4 % ophthalmic solution Place 1 drop into both eyes 4 times daily 10 mL 5     NASALCROM 5.2 MG/ACT AERS Inhaler SPRAY ONE SPRAY( 1 ML) IN NOSTRIL DAILY 1 Bottle 6     Cholecalciferol (VITAMIN D3) 2000 UNITS CAPS Take 5,000 Units by mouth daily Takes 2 tabs daily 60 capsule 4     order for DME Equipment being ordered: compression stockings. 20-30 mm or 30 - 40 mm as patient can tolerate. Physical therapy to determine if they should be above or below the knee. 2 Units 4     order for DME Equipment being ordered: compression bra 2 Device 1     ranitidine (ZANTAC) 75 MG tablet Take 1 tablet (75 mg) by mouth 3 times daily 270 tablet 3     aluminum chloride (DRYSOL) 20 % external solution Apply topically At Bedtime (Patient taking differently: Apply topically nightly as needed ) 60 mL 3     UNABLE TO FIND MEDICATION NAME: Tumeric 3 capsules daily (on hold during chemo)       mometasone (ASMANEX 30 METERED DOSES) 110 MCG/INH inhaler Inhale 1 puff into the lungs daily (Patient taking differently: Inhale 1 puff into the lungs daily as needed ) 3 Inhaler 3     UNABLE TO FIND 2 tablets 3 times daily MEDICATION NAME: Natural D-Hist (on hold during chemo)       calcium citrate-vitamin D (CALCIUM CITRATE +D) 315-250 MG-UNIT TABS Take 2 tablets by mouth twice a week  120 tablet      calcium carbonate (TUMS) 500 MG chewable tablet Take 2 tablets (1,000 mg) by mouth nightly as needed for other (cramps) 180 chew tab 3     UNABLE TO FIND 3 tablets 3 times daily MEDICATION NAME: calcium D-Glucarate   3 caps contain 180mg of elemental  calcium.       Triamcinolone Acetonide (AZMACORT IN) Inhale 2 puffs into the lungs as needed       UNABLE TO FIND 2 tablets 3 times daily MEDICATION NAME: Digestzymes        UNABLE TO FIND 1 tablet daily MEDICATION NAME: Pure Encapsulations       Probiotic Product (PROBIOTIC DAILY PO) Take 1 capsule by mouth daily Lacto acid bifidobacterium       Physical Examination:  /71 (BP Location: Left arm, Cuff Size: Adult Regular)  Pulse 104  Temp 97.7  F (36.5  C) (Oral)  Resp 16  Wt 84.3 kg (185 lb 12.8 oz)  SpO2 99%  BMI 32.72 kg/m2  Wt Readings from Last 10 Encounters:   11/28/17 84.3 kg (185 lb 12.8 oz)   11/28/17 84.3 kg (185 lb 12.8 oz)   11/27/17 83 kg (182 lb 14.4 oz)   11/22/17 83.8 kg (184 lb 11.2 oz)   11/20/17 85.2 kg (187 lb 12.8 oz)   11/16/17 85.1 kg (187 lb 11.2 oz)   11/13/17 85.3 kg (188 lb)   11/10/17 84.8 kg (187 lb)   11/06/17 83.3 kg (183 lb 9.6 oz)   11/03/17 82.6 kg (182 lb)   Constitutional: diaphoretic, somnolent at times throughout the exam  Eyes:  PERRL. No scleral icterus.  ENT: Oral mucosa is moist without lesions or thrush.   Cardiovascular: Regular rate and rhythm.  Respiratory: Clear to auscultation bilaterally. No wheezes or crackles.  Gastrointestinal: Bowel sounds +. Soft, nontender  Neurologic:Slurred speech, pt frequently closing eyes during exam  Skin:No rashes noted. Normal appearing skin under bra  Extremities: 1+ lower extremity edema bilaterally, compression stockings in place.    Laboratory Data:  Results for orders placed or performed in visit on 11/28/17 (from the past 24 hour(s))   Comprehensive metabolic panel   Result Value Ref Range    Sodium 139 133 - 144 mmol/L    Potassium 4.4 3.4 - 5.3 mmol/L    Chloride 102 94 - 109 mmol/L    Carbon Dioxide 30 20 - 32 mmol/L    Anion Gap 8 3 - 14 mmol/L    Glucose 101 (H) 70 - 99 mg/dL    Urea Nitrogen 18 7 - 30 mg/dL    Creatinine 0.89 0.52 - 1.04 mg/dL    GFR Estimate 65 >60 mL/min/1.7m2    GFR Estimate If Black 79 >60  mL/min/1.7m2    Calcium 8.3 (L) 8.5 - 10.1 mg/dL    Bilirubin Total 0.3 0.2 - 1.3 mg/dL    Albumin 3.1 (L) 3.4 - 5.0 g/dL    Protein Total 6.1 (L) 6.8 - 8.8 g/dL    Alkaline Phosphatase 63 40 - 150 U/L    ALT 30 0 - 50 U/L    AST 21 0 - 45 U/L   CBC with platelets differential   Result Value Ref Range    WBC 2.1 (L) 4.0 - 11.0 10e9/L    RBC Count 3.18 (L) 3.8 - 5.2 10e12/L    Hemoglobin 9.0 (L) 11.7 - 15.7 g/dL    Hematocrit 28.6 (L) 35.0 - 47.0 %    MCV 90 78 - 100 fl    MCH 28.3 26.5 - 33.0 pg    MCHC 31.5 31.5 - 36.5 g/dL    RDW 21.7 (H) 10.0 - 15.0 %    Platelet Count 110 (L) 150 - 450 10e9/L    Diff Method Manual Differential     % Neutrophils 37.9 %    % Lymphocytes 26.1 %    % Monocytes 21.6 %    % Eosinophils 0.9 %    % Basophils 3.6 %    % Metamyelocytes 3.6 %    % Myelocytes 6.3 %    Nucleated RBCs 24 (H) 0 /100    Absolute Neutrophil 0.8 (L) 1.6 - 8.3 10e9/L    Absolute Lymphocytes 0.5 (L) 0.8 - 5.3 10e9/L    Absolute Monocytes 0.5 0.0 - 1.3 10e9/L    Absolute Eosinophils 0.0 0.0 - 0.7 10e9/L    Absolute Basophils 0.1 0.0 - 0.2 10e9/L    Absolute Metamyelocytes 0.1 (H) 0 10e9/L    Absolute Myelocytes 0.1 (H) 0 10e9/L    Absolute Nucleated RBC 0.5     Anisocytosis Moderate     Teardrop Cells Slight     Ovalocytes Slight     Microcytes Present     Platelet Estimate Confirming automated cell count    ABO/Rh type and screen   Result Value Ref Range    Units Ordered 2     ABO O     RH(D) Pos     Antibody Screen Neg     Test Valid Only At          [facility]    Specimen Expires 12/01/2017     Crossmatch Red Blood Cells    Blood component   Result Value Ref Range    Unit Number [id number]     Blood Component Type Red Blood Cells Leukocyte Reduced     Division Number 00     Status of Unit No longer available 11/28/2017 1501     Blood Product Code [id number]     Unit Status RET    Blood component   Result Value Ref Range    Unit Number [id number]     Blood  "Component Type Red Blood Cells Leukocyte Reduced     Division Number 00     Status of Unit No longer available 11/28/2017 1501     Blood Product Code [id number]     Unit Status RET      *Note: Due to a large number of results and/or encounters for the requested time period, some results have not been displayed. A complete set of results can be found in Results Review.     Assessment and Plan:    DLBCL, \"double-hit\"-like, with c-myc overexpression but not re-arrangement. currently on treatment with DA-R-EPOCH  S/p 3 cycles   Complication include mild mucositis and mild infusion reaction (rigors), nausea and fatigue. ONly needed PRBC once. No infecions.     Interval CT CAP after 1 cycle due to elevated LD and uric acid showed response to treatment. Plan is for PET scan after this next cycle given c/o of chest pain.   Recommended that she take zofran TID to prevent nausea    Will need hospital admissions for DA-R-EPOCH on 12/7, 12/28, and 1/18    Heme  No transfusion needs today. Continue to monitor with twice weekly labs and transfuse if Hgb<8 and Plt<10k.      ID  Continue ppx ACV 400mg BID, levaquin 250mg daily, and fluconazole 200mg daily.       History of stage 1, ER/MS+, HER2- breast cancer s/p bilateral mastectomies and BENJIE/BSO  Follows with Dr. Crawley. Oncotype recurrence score 11%. Was initially on Arimidex ([id number]) but changed to Tamoxifen due to arthralgias. Tamoxifen held since 10/5 and continue to hold with chemotherapy.     History of papillary thyroid cancer, s/p total thyroidectomy  Follows with Dr. Castro. Has post surgical hypothyroidism. Continue levothyroxine and calcitriol as prescribed. Neck u/s on 11/2 shows no evidence of disease.     History of Rachael en Y gastric bypass  Continue supplements (calcium citrate-vitamin D, vitamin D3, magnesium citrate). Also has iron deficiency anemia likely from poor absorption and per Dr. Sauceda, she should continue 325mg PO ferrous sulfate TID. She should " take stool softeners if issues with iron induced constipation.     Chronic chest wall pain s/p mastectomies  Follows with Dr. Benitez. Palliative care to continue to prescribe pain medications. Taking MS Contin, Oxycodone for breakthrough pain, and celebrex.   Recommended pt hold short acting pain meds as she was impaired during exam today and drove herself to clinic today. We expressed concerns to her that she is taking too much pain medication to be driving and working in this condition.      Gerri Kessler MD   Seen with Dr. Sauceda    Today, I  saw and examined the patient independently in clinic and discussed the recommendations. I reviewed the case with the fellow and agree with the note as above. Patient is dong very well with chemotherapy and in fact cont to work full-time. I am concerned about her impaired cognition and alerntness today due to opiates. We will massage dr. Benitez to adjust her pain meds. Given complains of back pain, it is reasonable to re-stage her after next cycle of R-EPOCH in end of December.     Garima Sauceda MD

## 2017-11-28 NOTE — NURSING NOTE
"Oncology Rooming Note    November 28, 2017 5:16 PM   Tisha Arias is a 57 year old female who presents for:    Chief Complaint   Patient presents with     Port Draw     Labs drawn from port by RN. Line flushed with saline and heparin. Vs taken and pt checked in for appt     RECHECK     Patient with DLBCL here for labs and provider     Initial Vitals: /71 (BP Location: Left arm, Cuff Size: Adult Regular)  Pulse 104  Temp 97.7  F (36.5  C) (Oral)  Resp 16  Wt 84.3 kg (185 lb 12.8 oz)  SpO2 99%  BMI 32.72 kg/m2 Estimated body mass index is 32.72 kg/(m^2) as calculated from the following:    Height as of 11/27/17: 1.605 m (5' 3.19\").    Weight as of this encounter: 84.3 kg (185 lb 12.8 oz). Body surface area is 1.94 meters squared.  Extreme Pain (8) Comment: Data Unavailable   No LMP recorded. Patient has had a hysterectomy.  Allergies reviewed: Yes  Medications reviewed: Yes    Medications: Medication refills not needed today.  Pharmacy name entered into [employer]: [id number] - [place], [place] - [address] [place]    Clinical concerns: NA NA was NOT notified.    5 minutes for nursing intake (face to face time)     Verenice Hall CMA              "

## 2017-11-28 NOTE — PROGRESS NOTES
Called patient's cell phone @ 1205 pm today.  Left msg that patient's labs looked good, no need for transfusion today, and to please call back to verify she received this message.    Patient arrived for infusion appointment.  Informed patient of lab values and no need for transfusion.    Patient's port flushed per protocol and removed.  Verified with patient that she has an appointment today to see Dr. Sauceda at 1700 today.

## 2017-11-28 NOTE — MR AVS SNAPSHOT
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)    [address] [place] [address]  [address] [facility] [address]   [phone]            Dec 01, 2017  6:30 AM CST   [facility] with  [facility]   [facility] ([facility])    [address] [place] [address]   [phone]            Dec 05, 2017  6:30 AM CST   [facility] with  [facility]   [facility] ([facility])    [address] [place] [address]  [address] [facility] [address]   [phone]            Dec 07, 2017  7:00 AM CST   (Arrive by 6:45 AM)   Return Visit with Jessica Cox PA-C   [facility] ([facility])    [address] [place] [address]  [address] [facility] [address]   [phone]            May 21, 2018  4:20 PM CDT   (Arrive by 4:05 PM)   RETURN ENDOCRINE with Avelina Castro MD   [facility] Endocrinology ([facility])    [address] [place] [address]  3rd [facility] [address]   [phone]              Future tests that were ordered for you today     Open Standing Orders        Priority Remaining Interval Expires Ordered    Red blood cell prepare order unit Routine 99/100 CONDITIONAL (SPECIFY) BLOOD  11/27/2017    Platelets prepare order unit  Routine 99/100 CONDITIONAL (SPECIFY) BLOOD  11/27/2017            Who to contact     If you have questions or need follow up information about today's clinic visit or your schedule please contact [facility] directly at [phone].  Normal or non-critical lab and imaging results will be communicated to you by [facility], letter or phone within 4 business days after the clinic has received the results. If you do not hear from us within 7 days, please contact the clinic through [employer] or phone. If you have a critical or abnormal lab result, we will notify you by phone as soon as possible.  Submit refill requests through [employer] or call your pharmacy and they will forward the refill request to us. Please allow 3 business days for your refill to be completed.          Additional Information About Your Visit        [employer] Information     [employer] gives you secure access to your electronic health record. If you see a primary care provider, you can also send messages to your care team and make appointments. If you have questions, please call your primary care clinic.  If you do not have a primary care provider, please call [phone] and they will assist you.        Care EveryWhere ID     This is your Care EveryWhere ID. This could be used by other organizations to access your [place] medical records  [id number]        Your Vitals Were     Pulse Temperature Respirations Pulse Oximetry BMI (Body Mass Index)       104 97.7  F (36.5  C) (Oral) 16 99% 32.72 kg/m2        Blood Pressure from Last 3 Encounters:   11/28/17 122/71   11/27/17 114/74   11/22/17 109/71    Weight from Last 3 Encounters:   11/28/17 84.3 kg (185 lb 12.8 oz)   11/27/17 83 kg (182 lb 14.4 oz)   11/22/17 83.8 kg (184 lb 11.2 oz)              We Performed the Following     ABO/Rh type and screen     Blood component     Blood component     CBC with platelets differential     Comprehensive metabolic panel     Platelets prepare order  unit          Today's Medication Changes          These changes are accurate as of: 11/28/17  3:18 PM.  If you have any questions, ask your nurse or doctor.               These medicines have changed or have updated prescriptions.        Dose/Directions    aluminum chloride 20 % external solution   Commonly known as:  DRYSOL   This may have changed:    - when to take this  - reasons to take this   Used for:  Rash, Intertrigo        Apply topically At Bedtime   Quantity:  60 mL   Refills:  3       calcitRIOL 0.25 MCG capsule   Commonly known as:  ROCALTROL   This may have changed:  additional instructions   Used for:  Postsurgical hypothyroidism, Papillary carcinoma, follicular variant (H), Metastasis to cervical lymph node (H)        TAKE 2 CAPSULES BY MOUTH EVERY MORNING AND TAKE 1 CAPSULE BY MOUTH EVERY NIGHT AT BEDTIME   Quantity:  270 capsule   Refills:  3       diazepam 5 MG tablet   Commonly known as:  VALIUM   This may have changed:    - when to take this  - additional instructions   Used for:  Chest wall pain        Dose:  5 mg   Take 1 tablet (5 mg) by mouth 3 times daily as needed For muscle spasms   Quantity:  90 tablet   Refills:  2       docusate sodium 100 MG tablet   Commonly known as:  COLACE   This may have changed:    - when to take this  - reasons to take this   Used for:  Acute constipation        Dose:  100 mg   Take 100 mg by mouth daily   Quantity:  30 tablet   Refills:  0       levothyroxine 112 MCG tablet   Commonly known as:  SYNTHROID/LEVOTHROID   This may have changed:    - how much to take  - additional instructions   Used for:  Postsurgical hypothyroidism, Papillary carcinoma, follicular variant (H), Postsurgical hypoparathyroidism (H), Thyroid cancer (H)        Mon-Sat: (2 tabs daily) Sunday: 3 tabs   Quantity:  189 tablet   Refills:  3       methocarbamol 750 MG tablet   Commonly known as:  ROBAXIN   This may have changed:    - when to take this  - additional instructions   Used for:   Myofascial pain        Dose:  750 mg   Take 1 tablet (750 mg) by mouth 4 times daily as needed . Ok to take a 5th Robaxin on very severe days.   Quantity:  360 tablet   Refills:  1       mometasone 110 MCG/INH inhaler   Commonly known as:  ASMANEX 30 METERED DOSES   This may have changed:    - when to take this  - reasons to take this   Used for:  Intermittent asthma, uncomplicated        Dose:  1 puff   Inhale 1 puff into the lungs daily   Quantity:  3 Inhaler   Refills:  3       montelukast 10 MG tablet   Commonly known as:  SINGULAIR   This may have changed:  how much to take   Used for:  Idiopathic mast cell activation syndrome (H)        Dose:  10 mg   Take 1 tablet (10 mg) by mouth 2 times daily   Quantity:  60 tablet   Refills:  0       polyethylene glycol powder   Commonly known as:  MIRALAX   This may have changed:    - when to take this  - reasons to take this   Used for:  Acute constipation        Dose:  1 capful   Take 17 g (1 capful) by mouth daily   Quantity:  510 g   Refills:  0                Primary Care Provider Office Phone # Fax #    Shahla Raul Crawley [phone]       [address] [place] [address]  [facility] [address]        Equal Access to Services     [facility]: Hadii sacha marley hadmoraimao Soesteban, waaxda luqadaha, qaybta kaalmada adearmenda, ranjan martin. So [facility] [phone].    ATENCIÓN: Si habla español, tiene a stiles disposición servicios gratuitos de asistencia lingüística. Llame al [phone].    We comply with applicable federal civil rights laws and [place] laws. We do not discriminate on the basis of race, color, national origin, age, disability, sex, sexual orientation, or gender identity.            Thank you!     Thank you for choosing [facility]  for your care. Our goal is always to provide you with excellent care. Hearing back from our patients is one way we can continue to improve our services. Please take a few  minutes to complete the written survey that you may receive in the mail after your visit with us. Thank you!             Your Updated Medication List - Protect others around you: Learn how to safely use, store and throw away your medicines at www.disposemymeds.org.          This list is accurate as of: 11/28/17  3:18 PM.  Always use your most recent med list.                   Brand Name Dispense Instructions for use Diagnosis    acyclovir 400 MG tablet    ZOVIRAX    60 tablet    Take 1 tablet (400 mg) by mouth 2 times daily    High grade B-cell lymphoma (H)       allopurinol 300 MG tablet    ZYLOPRIM     Take 300 mg by mouth daily        aluminum chloride 20 % external solution    DRYSOL    60 mL    Apply topically At Bedtime    Rash, Intertrigo       AZMACORT IN      Inhale 2 puffs into the lungs as needed        bisacodyl 5 MG EC tablet     30 tablet    Take 3 tablets (15 mg) by mouth daily as needed for constipation    Constipation, unspecified constipation type       calcitRIOL 0.25 MCG capsule    ROCALTROL    270 capsule    TAKE 2 CAPSULES BY MOUTH EVERY MORNING AND TAKE 1 CAPSULE BY MOUTH EVERY NIGHT AT BEDTIME    Postsurgical hypothyroidism, Papillary carcinoma, follicular variant (H), Metastasis to cervical lymph node (H)       CALCIUM CITRATE +D 315-250 MG-UNIT Tabs per tablet   Generic drug:  calcium citrate-vitamin D     120 tablet    Take 2 tablets by mouth twice a week        celecoxib 200 MG capsule    celeBREX    56 capsule    TAKE ONE CAPSULE BY MOUTH TWICE DAILY    Chest wall pain       cetirizine 10 MG tablet    ZYRTEC ALLERGY    30 tablet    Take 1 tablet (10 mg) by mouth 3 times daily On hold for lab test.    High grade B-cell lymphoma (H)       COMPOUND CONTAINING CONTROLLED SUBSTANCE - PHARMACY TO MIX COMPOUNDED MEDICATION    CMPD RX    120 g    Apply small amount to affected area two times daily.    Neoplasm related pain       cromolyn 4 % ophthalmic solution    OPTICROM    10 mL    Place 1  drop into both eyes 4 times daily    Idiopathic mast cell activation syndrome (H)       cyclobenzaprine 10 MG tablet    FLEXERIL    90 tablet    Take 1 tablet (10 mg) by mouth 3 times daily as needed    High grade B-cell lymphoma (H)       dexamethasone 4 MG tablet    DECADRON    4 tablet    Take 2 tablets (8 mg) by mouth daily for 2 days On Wed 11/22 and Thursday 11/23    High grade B-cell lymphoma (H)       diazepam 5 MG tablet    VALIUM    90 tablet    Take 1 tablet (5 mg) by mouth 3 times daily as needed For muscle spasms    Chest wall pain       diclofenac 1 % Gel topical gel    VOLTAREN    100 g    Apply affected area two times daily PRN using enclosed dosing card.    Myofascial pain       diphenhydrAMINE 50 MG capsule    BENADRYL    56 capsule    Take 1 capsule (50 mg) by mouth nightly as needed for itching or sleep    High grade B-cell lymphoma (H)       docusate sodium 100 MG tablet    COLACE    30 tablet    Take 100 mg by mouth daily    Acute constipation       EPINEPHrine 0.3 MG/0.3ML injection 2-pack    EPIPEN 2-CARIDAD    0.6 mL    Inject 0.3 mLs (0.3 mg) into the muscle once as needed for anaphylaxis        ferrous sulfate 325 (65 FE) MG tablet    IRON    90 tablet    Take 1 tablet (325 mg) by mouth 3 times daily (with meals)    Status post bariatric surgery       fluconazole 200 MG tablet    DIFLUCAN    30 tablet    Take 1 tablet (200 mg) by mouth daily    High grade B-cell lymphoma (H)       furosemide 20 MG tablet    LASIX    60 tablet    Take 1 tablet (20 mg) by mouth 2 times daily    Localized edema       hydrochlorothiazide 12.5 MG capsule    MICROZIDE    90 capsule    Take 1 capsule (12.5 mg) by mouth daily    Hypocalcemia       levofloxacin 250 MG tablet    LEVAQUIN    30 tablet    Take 1 tablet (250 mg) by mouth daily    High grade B-cell lymphoma (H)       levothyroxine 112 MCG tablet    SYNTHROID/LEVOTHROID    189 tablet    Mon-Sat: (2 tabs daily) Sunday: 3 tabs    Postsurgical hypothyroidism,  Papillary carcinoma, follicular variant (H), Postsurgical hypoparathyroidism (H), Thyroid cancer (H)       lidocaine 5 % ointment    XYLOCAINE    350 g    Apply quarter size amount to chest and back up to 3 times daily as needed for pain.    Chest wall pain       lidocaine visc 2% 2.5mL/5mL & maalox/mylanta w/ simeth 2.5mL/5mL & diphenhydrAMINE 5mg/5mL Susp suspension    [facility]    360 mL    Swish and spit 10 mLs in mouth every 6 hours as needed for mouth sores    Stomatitis and mucositis, High grade B-cell lymphoma (H)       lidocaine-prilocaine cream    EMLA    30 g    Apply topically as needed (port access pain.)    Neoplasm related pain, Chest wall pain       methocarbamol 750 MG tablet    ROBAXIN    360 tablet    Take 1 tablet (750 mg) by mouth 4 times daily as needed . Ok to take a 5th Robaxin on very severe days.    Myofascial pain       mometasone 110 MCG/INH inhaler    ASMANEX 30 METERED DOSES    3 Inhaler    Inhale 1 puff into the lungs daily    Intermittent asthma, uncomplicated       montelukast 10 MG tablet    SINGULAIR    60 tablet    Take 1 tablet (10 mg) by mouth 2 times daily    Idiopathic mast cell activation syndrome (H)       * MS CONTIN PO      Take 60 mg by mouth daily Takes at 8pm        * morphine 30 MG 12 hr tablet   Start taking on:  11/30/2017    MS CONTIN    120 tablet    Take 1 tablet (30 mg) by mouth every 8 hours . Take an addition pill at night such that your evening dose is 60mg    Neoplasm related pain       NASALCROM 5.2 MG/ACT Aers Inhaler   Generic drug:  cromolyn sodium     1 Bottle    SPRAY ONE SPRAY( 1 ML) IN NOSTRIL DAILY    Mast cell disease, systemic       ondansetron 8 MG ODT tab    ZOFRAN-ODT    30 tablet    Take 1 tablet (8 mg) by mouth every 8 hours as needed    High grade B-cell lymphoma (H), Nausea and vomiting, intractability of vomiting not specified, unspecified vomiting type       * order for DME     2 Units    Equipment being ordered: compression  stockings. 20-30 mm or 30 - 40 mm as patient can tolerate. Physical therapy to determine if they should be above or below the knee.    Venous stasis       * order for DME     2 Device    Equipment being ordered: compression bra    Malignant neoplasm of right female breast, unspecified site of breast       * order for DME     1 Units    Compression stockings, medium grade, please measure and fit. Please dispense a pair.    Peripheral edema       oxyCODONE IR 30 MG tablet    ROXICODONE    180 tablet    Take 1 tablet (30 mg) by mouth every 4 hours as needed for moderate to severe pain    High grade B-cell lymphoma (H)       polyethylene glycol powder    MIRALAX    510 g    Take 17 g (1 capful) by mouth daily    Acute constipation       potassium chloride SA 20 MEQ CR tablet    KLOR-CON    90 tablet    Take 1 tablet (20 mEq) by mouth daily    Hypokalemia       PROBIOTIC DAILY PO      Take 1 capsule by mouth daily Lacto acid bifidobacterium    Breast cancer, unspecified laterality, Thyroid cancer (H), Chronic arthralgias of knees and hips, unspecified laterality       prochlorperazine 10 MG tablet    COMPAZINE    60 tablet    Take 1 tablet (10 mg) by mouth every 6 hours as needed for nausea or vomiting    High grade B-cell lymphoma (H), Nausea       ranitidine 75 MG tablet    ZANTAC    270 tablet    Take 1 tablet (75 mg) by mouth 3 times daily    Mast cell disease, systemic       senna-docusate 8.6-50 MG per tablet    SENOKOT-S;PERICOLACE    60 tablet    Take 1-2 tablets by mouth 2 times daily as needed for constipation    Acute constipation       SUMAtriptan 50 MG tablet    IMITREX    9 tablet    Take 1 tablet (50 mg) by mouth at onset of headache for migraine (may repeat in 2 hours as needed, max 2 tablets daily)    Migraine without status migrainosus, not intractable, unspecified migraine type       triamcinolone 0.025 % ointment    KENALOG     Apply topically as needed        TUMS 500 MG chewable tablet   Generic  drug:  calcium carbonate     180 chew tab    Take 2 tablets (1,000 mg) by mouth nightly as needed for other (cramps)        * UNABLE TO FIND      3 tablets 3 times daily MEDICATION NAME: calcium D-Glucarate  3 caps contain 180mg of elemental calcium.        * UNABLE TO FIND      2 tablets 3 times daily MEDICATION NAME: Natural D-Hist (on hold during chemo)    Breast cancer, unspecified laterality, Papillary carcinoma, follicular variant (H), Chronic musculoskeletal pain       * UNABLE TO FIND      MEDICATION NAME: Tumeric 3 capsules daily (on hold during chemo)        * UNABLE TO FIND      Take 2 capsules by mouth 3 times daily Muscle Mag. 2 caps contain B1 20mg, B2 20mg, B6 10mg, magesium 20mg, manganese 2mg.        * UNABLE TO FIND      2 tablets 3 times daily MEDICATION NAME: Digestzymes    Thyroid cancer (H), Postsurgical hypothyroidism, Postsurgical hypoparathyroidism (H)       * UNABLE TO FIND      1 tablet daily MEDICATION NAME: Pure Encapsulations    Thyroid cancer (H), Postsurgical hypothyroidism, Postsurgical hypoparathyroidism (H)       VENTOLIN  (90 BASE) MCG/ACT Inhaler   Generic drug:  albuterol     18 g    INHALE 2 PUFFS INTO THE LUNGS EVERY 4 HOURS AS NEEDED FOR SHORTNESS OF BREATH OR DIFFICULT BREATHING OR WHEEZING    Mild intermittent asthma without complication       vitamin D3 2000 UNITS Caps     60 capsule    Take 5,000 Units by mouth daily Takes 2 tabs daily    Thyroid cancer (H), Postsurgical hypothyroidism, Papillary carcinoma, follicular variant (H), Postsurgical hypoparathyroidism (H)       * Notice:  This list has 11 medication(s) that are the same as other medications prescribed for you. Read the directions carefully, and ask your doctor or other care provider to review them with you.

## 2017-11-28 NOTE — NURSING NOTE
"Chief Complaint   Patient presents with     Port Draw     Port accessed by RN. Line flushed with saline and heparin. Vs taken and pt checked in for appt     Port accessed with 20g 3/4\" gripper needle by RN, labs collected, line flushed with saline and heparin.  Vitals taken. Pt checked in for appointment(s).    Daly Hooper RN  "

## 2017-11-28 NOTE — PROGRESS NOTES
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 She is currently undergoing treatment with DA-R-EPOCH. Her past medical history is significant for breast cancer in 2011 s/p bilateral mastectomies and BENJIE/BSO up until recently on Tamoxifen, papillary thyroid cancer s/p total thyroidectomy, chronic chest wall pain, and asthma. Briefly, her oncologic history is as follows:    She presented in 8/2017 with dramatically worse chest and back pain. CT 9/1/17 showed lytic lesion right 10th rib extending to right T9-10 neural foramen with vertebral body invasion. There was associated conglomerate of lymphadenopathy around the mid T-spine. She had a CT guided biopsy showing B-cell high grade lymphoma. Pathology showed \"The morphology shows a population of large cells, diffuse lymphoid infiltrate. The cells are atypical with abundant mitotic and apoptotic activity and single cell necrosis. Tumor is CD45 and CD79a positive and focally weakly CD30 positive. The other stains revealed positivity for CD20, BCL6, BCL2 and MUM1, and CD10 and CD21 negative. The CD5 and CD3 highlighted background T-cells.  MYC expression was equivocal with approximately 40% nuclei staining.  Ki-67 proliferative index is greater than 90-95%. The immunohistochemistry is suggestive of non-GCB immunophenotype of diffuse large B-cell lymphoma. The cytogenetics did not show rearrangement of the BCL2 or c-MYC. There is the presence of BCL6 rearrangement in 78% of cells and gains of MYC and BCL2, but no amplifications.\"     Staging LP and BMBx were negative for lymphoma.   She started DA-REPOCH 10/6/17. She did well with chemo other than rigors during [facility].  Post cycle 1 complicated by mucositis and worsening of chronic LE peripheral edema.   Cycle 2 on 10/27/17. Due to a rise in her LD and uric acid " levels on that admission day, she underwent a CT scan which showed response to therapy with improvement in her mediastinal lymphadenopathy and right chest wall mass.  Cycle 3 on 11/16        Interval History:  She reports that overall since hospital discharge she has been doing okay. She reports feeling tired and being the 'throw-up queen' but denies any emesis. States she is taking zofran but only taking a half-dose. Also reports chest pain, pelvic girdle pain, and leg pain. Pt reports that she continues to work. Her  was on the phone during the exam. History taking was at times limited by the pt dosing off.         Current Outpatient Prescriptions   Medication Sig Dispense Refill     allopurinol (ZYLOPRIM) 300 MG tablet Take 300 mg by mouth daily  0     triamcinolone (KENALOG) 0.025 % ointment Apply topically as needed  3     [START ON 11/30/2017] morphine (MS CONTIN) 30 MG 12 hr tablet Take 1 tablet (30 mg) by mouth every 8 hours . Take an addition pill at night such that your evening dose is 60mg 120 tablet 0     oxyCODONE IR (ROXICODONE) 30 MG tablet Take 1 tablet (30 mg) by mouth every 4 hours as needed for moderate to severe pain 180 tablet 0     lidocaine (XYLOCAINE) 5 % ointment Apply quarter size amount to chest and back up to 3 times daily as needed for pain. 350 g 1     montelukast (SINGULAIR) 10 MG tablet Take 1 tablet (10 mg) by mouth 2 times daily (Patient taking differently: Take 20 mg by mouth 2 times daily ) 60 tablet 0     ondansetron (ZOFRAN-ODT) 8 MG ODT tab Take 1 tablet (8 mg) by mouth every 8 hours as needed 30 tablet 1     fluconazole (DIFLUCAN) 200 MG tablet Take 1 tablet (200 mg) by mouth daily 30 tablet 0     acyclovir (ZOVIRAX) 400 MG tablet Take 1 tablet (400 mg) by mouth 2 times daily 60 tablet 0     dexamethasone (DECADRON) 4 MG tablet Take 2 tablets (8 mg) by mouth daily for 2 days On Wed 11/22 and Thursday 11/23 4 tablet 0     furosemide (LASIX) 20 MG tablet Take 1 tablet (20  mg) by mouth 2 times daily 60 tablet 0     levofloxacin (LEVAQUIN) 250 MG tablet Take 1 tablet (250 mg) by mouth daily 30 tablet 0     magic mouthwash suspension (diphenhydrAMINE, lidocaine, aluminum-magnesium & simethicone) Swish and spit 10 mLs in mouth every 6 hours as needed for mouth sores 360 mL 1     diclofenac (VOLTAREN) 1 % GEL topical gel Apply affected area two times daily PRN using enclosed dosing card. 100 g 0     prochlorperazine (COMPAZINE) 10 MG tablet Take 1 tablet (10 mg) by mouth every 6 hours as needed for nausea or vomiting 60 tablet 3     cetirizine (ZYRTEC ALLERGY) 10 MG tablet Take 1 tablet (10 mg) by mouth 3 times daily On hold for lab test. 30 tablet 0     ferrous sulfate (IRON) 325 (65 FE) MG tablet Take 1 tablet (325 mg) by mouth 3 times daily (with meals) 90 tablet 0     bisacodyl (DULCOLAX) 5 MG EC tablet Take 3 tablets (15 mg) by mouth daily as needed for constipation 30 tablet 0     docusate sodium (COLACE) 100 MG tablet Take 100 mg by mouth daily (Patient taking differently: Take 100 mg by mouth daily as needed ) 30 tablet 0     polyethylene glycol (MIRALAX) powder Take 17 g (1 capful) by mouth daily (Patient taking differently: Take 1 capful by mouth daily as needed ) 510 g 0     cyclobenzaprine (FLEXERIL) 10 MG tablet Take 1 tablet (10 mg) by mouth 3 times daily as needed 90 tablet 0     senna-docusate (SENOKOT-S;PERICOLACE) 8.6-50 MG per tablet Take 1-2 tablets by mouth 2 times daily as needed for constipation 60 tablet 0     UNABLE TO FIND Take 2 capsules by mouth 3 times daily Muscle Mag. 2 caps contain B1 20mg, B2 20mg, B6 10mg, magesium 20mg, manganese 2mg.       Morphine Sulfate (MS CONTIN PO) Take 60 mg by mouth daily Takes at 8pm       lidocaine-prilocaine (EMLA) cream Apply topically as needed (port access pain.) 30 g 1     SUMAtriptan (IMITREX) 50 MG tablet Take 1 tablet (50 mg) by mouth at onset of headache for migraine (may repeat in 2 hours as needed, max 2 tablets  daily) 9 tablet 3     calcitRIOL (ROCALTROL) 0.25 MCG capsule TAKE 2 CAPSULES BY MOUTH EVERY MORNING AND TAKE 1 CAPSULE BY MOUTH EVERY NIGHT AT BEDTIME (Patient taking differently: TAKE 2 CAPSULES BY MOUTH EVERY MORNING AND TAKE 1 CAPSULE BY MOUTH at noon) 270 capsule 3     celecoxib (CELEBREX) 200 MG capsule TAKE ONE CAPSULE BY MOUTH TWICE DAILY  56 capsule 0     order for DME Compression stockings, medium grade, please measure and fit. Please dispense a pair. 1 Units 0     diphenhydrAMINE (BENADRYL) 50 MG capsule Take 1 capsule (50 mg) by mouth nightly as needed for itching or sleep (Patient not taking: Reported on 11/16/2017) 56 capsule      methocarbamol (ROBAXIN) 750 MG tablet Take 1 tablet (750 mg) by mouth 4 times daily as needed . Ok to take a 5th Robaxin on very severe days. (Patient taking differently: Take 750 mg by mouth 4 times daily . Ok to take a 5th Robaxin on very severe days.) 360 tablet 1     COMPOUND CONTAINING CONTROLLED SUBSTANCE (CMPD RX) - PHARMACY TO MIX COMPOUNDED MEDICATION Apply small amount to affected area two times daily. (Patient not taking: Reported on 11/27/2017) 120 g 6     diazepam (VALIUM) 5 MG tablet Take 1 tablet (5 mg) by mouth 3 times daily as needed For muscle spasms (Patient taking differently: Take 5 mg by mouth 3 times daily For muscle spasms) 90 tablet 2     levothyroxine (SYNTHROID/LEVOTHROID) 112 MCG tablet Mon-Sat: (2 tabs daily) Sunday: 3 tabs (Patient taking differently: 112 mcg Mon-Sat: (2 tabs daily) Sunday: 3 tabs) 189 tablet 3     hydrochlorothiazide (MICROZIDE) 12.5 MG capsule Take 1 capsule (12.5 mg) by mouth daily 90 capsule 2     EPINEPHrine (EPIPEN 2-CARIDAD) 0.3 MG/0.3ML injection Inject 0.3 mLs (0.3 mg) into the muscle once as needed for anaphylaxis 0.6 mL 3     potassium chloride SA (POTASSIUM CHLORIDE) 20 MEQ CR tablet Take 1 tablet (20 mEq) by mouth daily 90 tablet 3     VENTOLIN  (90 BASE) MCG/ACT Inhaler INHALE 2 PUFFS INTO THE LUNGS EVERY 4 HOURS  AS NEEDED FOR SHORTNESS OF BREATH OR DIFFICULT BREATHING OR WHEEZING 18 g 3     cromolyn (OPTICROM) 4 % ophthalmic solution Place 1 drop into both eyes 4 times daily 10 mL 5     NASALCROM 5.2 MG/ACT AERS Inhaler SPRAY ONE SPRAY( 1 ML) IN NOSTRIL DAILY 1 Bottle 6     Cholecalciferol (VITAMIN D3) 2000 UNITS CAPS Take 5,000 Units by mouth daily Takes 2 tabs daily 60 capsule 4     order for DME Equipment being ordered: compression stockings. 20-30 mm or 30 - 40 mm as patient can tolerate. Physical therapy to determine if they should be above or below the knee. 2 Units 4     order for DME Equipment being ordered: compression bra 2 Device 1     ranitidine (ZANTAC) 75 MG tablet Take 1 tablet (75 mg) by mouth 3 times daily 270 tablet 3     aluminum chloride (DRYSOL) 20 % external solution Apply topically At Bedtime (Patient taking differently: Apply topically nightly as needed ) 60 mL 3     UNABLE TO FIND MEDICATION NAME: Tumeric 3 capsules daily (on hold during chemo)       mometasone (ASMANEX 30 METERED DOSES) 110 MCG/INH inhaler Inhale 1 puff into the lungs daily (Patient taking differently: Inhale 1 puff into the lungs daily as needed ) 3 Inhaler 3     UNABLE TO FIND 2 tablets 3 times daily MEDICATION NAME: Natural D-Hist (on hold during chemo)       calcium citrate-vitamin D (CALCIUM CITRATE +D) 315-250 MG-UNIT TABS Take 2 tablets by mouth twice a week  120 tablet      calcium carbonate (TUMS) 500 MG chewable tablet Take 2 tablets (1,000 mg) by mouth nightly as needed for other (cramps) 180 chew tab 3     UNABLE TO FIND 3 tablets 3 times daily MEDICATION NAME: calcium D-Glucarate   3 caps contain 180mg of elemental calcium.       Triamcinolone Acetonide (AZMACORT IN) Inhale 2 puffs into the lungs as needed       UNABLE TO FIND 2 tablets 3 times daily MEDICATION NAME: Digestzymes        UNABLE TO FIND 1 tablet daily MEDICATION NAME: Pure Encapsulations       Probiotic Product (PROBIOTIC DAILY PO) Take 1 capsule by mouth  daily Lacto acid bifidobacterium       Physical Examination:  /71 (BP Location: Left arm, Cuff Size: Adult Regular)  Pulse 104  Temp 97.7  F (36.5  C) (Oral)  Resp 16  Wt 84.3 kg (185 lb 12.8 oz)  SpO2 99%  BMI 32.72 kg/m2  Wt Readings from Last 10 Encounters:   11/28/17 84.3 kg (185 lb 12.8 oz)   11/28/17 84.3 kg (185 lb 12.8 oz)   11/27/17 83 kg (182 lb 14.4 oz)   11/22/17 83.8 kg (184 lb 11.2 oz)   11/20/17 85.2 kg (187 lb 12.8 oz)   11/16/17 85.1 kg (187 lb 11.2 oz)   11/13/17 85.3 kg (188 lb)   11/10/17 84.8 kg (187 lb)   11/06/17 83.3 kg (183 lb 9.6 oz)   11/03/17 82.6 kg (182 lb)   Constitutional: diaphoretic, somnolent at times throughout the exam  Eyes:  PERRL. No scleral icterus.  ENT: Oral mucosa is moist without lesions or thrush.   Cardiovascular: Regular rate and rhythm.  Respiratory: Clear to auscultation bilaterally. No wheezes or crackles.  Gastrointestinal: Bowel sounds +. Soft, nontender  Neurologic:Slurred speech, pt frequently closing eyes during exam  Skin:No rashes noted. Normal appearing skin under bra  Extremities: 1+ lower extremity edema bilaterally, compression stockings in place.    Laboratory Data:  Results for orders placed or performed in visit on 11/28/17 (from the past 24 hour(s))   Comprehensive metabolic panel   Result Value Ref Range    Sodium 139 133 - 144 mmol/L    Potassium 4.4 3.4 - 5.3 mmol/L    Chloride 102 94 - 109 mmol/L    Carbon Dioxide 30 20 - 32 mmol/L    Anion Gap 8 3 - 14 mmol/L    Glucose 101 (H) 70 - 99 mg/dL    Urea Nitrogen 18 7 - 30 mg/dL    Creatinine 0.89 0.52 - 1.04 mg/dL    GFR Estimate 65 >60 mL/min/1.7m2    GFR Estimate If Black 79 >60 mL/min/1.7m2    Calcium 8.3 (L) 8.5 - 10.1 mg/dL    Bilirubin Total 0.3 0.2 - 1.3 mg/dL    Albumin 3.1 (L) 3.4 - 5.0 g/dL    Protein Total 6.1 (L) 6.8 - 8.8 g/dL    Alkaline Phosphatase 63 40 - 150 U/L    ALT 30 0 - 50 U/L    AST 21 0 - 45 U/L   CBC with platelets differential   Result Value Ref Range    WBC 2.1  (L) 4.0 - 11.0 10e9/L    RBC Count 3.18 (L) 3.8 - 5.2 10e12/L    Hemoglobin 9.0 (L) 11.7 - 15.7 g/dL    Hematocrit 28.6 (L) 35.0 - 47.0 %    MCV 90 78 - 100 fl    MCH 28.3 26.5 - 33.0 pg    MCHC 31.5 31.5 - 36.5 g/dL    RDW 21.7 (H) 10.0 - 15.0 %    Platelet Count 110 (L) 150 - 450 10e9/L    Diff Method Manual Differential     % Neutrophils 37.9 %    % Lymphocytes 26.1 %    % Monocytes 21.6 %    % Eosinophils 0.9 %    % Basophils 3.6 %    % Metamyelocytes 3.6 %    % Myelocytes 6.3 %    Nucleated RBCs 24 (H) 0 /100    Absolute Neutrophil 0.8 (L) 1.6 - 8.3 10e9/L    Absolute Lymphocytes 0.5 (L) 0.8 - 5.3 10e9/L    Absolute Monocytes 0.5 0.0 - 1.3 10e9/L    Absolute Eosinophils 0.0 0.0 - 0.7 10e9/L    Absolute Basophils 0.1 0.0 - 0.2 10e9/L    Absolute Metamyelocytes 0.1 (H) 0 10e9/L    Absolute Myelocytes 0.1 (H) 0 10e9/L    Absolute Nucleated RBC 0.5     Anisocytosis Moderate     Teardrop Cells Slight     Ovalocytes Slight     Microcytes Present     Platelet Estimate Confirming automated cell count    ABO/Rh type and screen   Result Value Ref Range    Units Ordered 2     ABO O     RH(D) Pos     Antibody Screen Neg     Test Valid Only At          [facility]    Specimen Expires 12/01/2017     Crossmatch Red Blood Cells    Blood component   Result Value Ref Range    Unit Number [id number]     Blood Component Type Red Blood Cells Leukocyte Reduced     Division Number 00     Status of Unit No longer available 11/28/2017 1501     Blood Product Code C2711V83     Unit Status RET    Blood component   Result Value Ref Range    Unit Number [id number]     Blood Component Type Red Blood Cells Leukocyte Reduced     Division Number 00     Status of Unit No longer available 11/28/2017 1501     Blood Product Code [id number]     Unit Status RET      *Note: Due to a large number of results and/or encounters for the requested time period, some results have not been displayed. A complete  "set of results can be found in Results Review.     Assessment and Plan:    DLBCL, \"double-hit\"-like, with c-myc overexpression but not re-arrangement. currently on treatment with DA-R-EPOCH  S/p 3 cycles   Complication include mild mucositis and mild infusion reaction (rigors), nausea and fatigue. ONly needed PRBC once. No infecions.     Interval CT CAP after 1 cycle due to elevated LD and uric acid showed response to treatment. Plan is for PET scan after this next cycle given c/o of chest pain.   Recommended that she take zofran TID to prevent nausea    Will need hospital admissions for DA-R-EPOCH on 12/7, 12/28, and 1/18    Heme  No transfusion needs today. Continue to monitor with twice weekly labs and transfuse if Hgb<8 and Plt<10k.      ID  Continue ppx ACV 400mg BID, levaquin 250mg daily, and fluconazole 200mg daily.       History of stage 1, ER/OH+, HER2- breast cancer s/p bilateral mastectomies and BENJIE/BSO  Follows with Dr. Crawley. Oncotype recurrence score 11%. Was initially on Arimidex ([id number]) but changed to Tamoxifen due to arthralgias. Tamoxifen held since 10/5 and continue to hold with chemotherapy.     History of papillary thyroid cancer, s/p total thyroidectomy  Follows with Dr. Castro. Has post surgical hypothyroidism. Continue levothyroxine and calcitriol as prescribed. Neck u/s on 11/2 shows no evidence of disease.     History of Rachael en Y gastric bypass  Continue supplements (calcium citrate-vitamin D, vitamin D3, magnesium citrate). Also has iron deficiency anemia likely from poor absorption and per Dr. Sauceda, she should continue 325mg PO ferrous sulfate TID. She should take stool softeners if issues with iron induced constipation.     Chronic chest wall pain s/p mastectomies  Follows with Dr. Benitez. Palliative care to continue to prescribe pain medications. Taking MS Contin, Oxycodone for breakthrough pain, and celebrex.   Recommended pt hold short acting pain meds as she was " impaired during exam today and drove herself to clinic today. We expressed concerns to her that she is taking too much pain medication to be driving and working in this condition.      Gerri Kessler MD   Seen with Dr. Sauceda    Today, I  saw and examined the patient independently in clinic and discussed the recommendations. I reviewed the case with the fellow and agree with the note as above. Patient is dong very well with chemotherapy and in fact cont to work full-time. I am concerned about her impaired cognition and alerntness today due to opiates. We will massage dr. Benitez to adjust her pain meds. Given complains of back pain, it is reasonable to re-stage her after next cycle of R-EPOCH in end of December.     Garima Sauceda MD

## 2017-11-28 NOTE — MR AVS SNAPSHOT
After Visit Summary   11/28/2017    Tisha Arias    MRN: [id number]           Patient Information     Date Of Birth          1960        Visit Information        Provider Department      11/28/2017 5:00 PM Garima Sauceda MD [facility] Blood [facility] Marrow Transplant        Today's Diagnoses     Diffuse large B-cell lymphoma of lymph nodes of multiple regions (H)    -  1          [facility] [place] [facility] [place]  [address] [place] [address]  Phone: [phone]  Clinic Hours:   Monday-Thursday:7am to 7pm   Friday: 7am to 5pm   Weekends and holidays:    8am to noon (in general)  If your fever is 100.5  or greater,   call the clinic.  After hours call the   hospital at [phone] or   [phone]. Ask for the BMT   fellow on-call            Follow-ups after your visit        Your next 10 appointments already scheduled     Dec 01, 2017  6:30 AM CST   [facility] with  [facility]   [facility] ([facility])    [address] [place] [id number] [phone]            Dec 05, 2017  6:30 AM CST   [facility] Draw with  [facility] DRAW   [facility] ([facility])    [address] [place] [id number] [phone]            Dec 07, 2017  7:00 AM CST   (Arrive by 6:45 AM)   Return Visit with Jessica Cox PA-C   [facility] ([facility])    [address] [place] [id number] [phone]            May 21, 2018  4:20 PM CDT   (Arrive by 4:05 PM)   RETURN ENDOCRINE with Avelina Castro MD   [facility] Endocrinology ([facility])    [address] [place] [id number] [phone]              Future tests that were ordered for you today     Open Future Orders        Priority Expected Expires Ordered    PET  Oncology Whole Body Routine 12/27/2017 11/29/2018 11/29/2017            Who to contact     If you have questions or need follow up information about today's clinic visit or your schedule please contact [facility] directly at [phone].  Normal or non-critical lab and imaging results will be communicated to you by Gradwellhart, letter or phone within 4 business days after the clinic has received the results. If you do not hear from us within 7 days, please contact the clinic through Tienda Nube / [employer] Sh[employer] or phone. If you have a critical or abnormal lab result, we will notify you by phone as soon as possible.  Submit refill requests through Putney or call your pharmacy and they will forward the refill request to us. Please allow 3 business days for your refill to be completed.          Additional Information About Your Visit        Gradwellhart Information     Putney gives you secure access to your electronic health record. If you see a primary care provider, you can also send messages to your care team and make appointments. If you have questions, please call your primary care clinic.  If you do not have a primary care provider, please call [phone] and they will assist you.        Care EveryWhere ID     This is your Care EveryWhere ID. This could be used by other organizations to access your [place] medical records  [id number]        Your Vitals Were     Pulse Temperature Respirations Pulse Oximetry BMI (Body Mass Index)       104 97.7  F (36.5  C) (Oral) 16 99% 32.72 kg/m2        Blood Pressure from Last 3 Encounters:   11/28/17 122/71   11/28/17 122/71   11/27/17 114/74    Weight from Last 3 Encounters:   11/28/17 84.3 kg (185 lb 12.8 oz)   11/28/17 84.3 kg (185 lb 12.8 oz)   11/27/17 83 kg (182 lb 14.4 oz)                 Today's Medication Changes          These changes are accurate as of: 11/28/17 11:59 PM.  If you have any questions, ask your nurse or doctor.               These medicines have  changed or have updated prescriptions.        Dose/Directions    aluminum chloride 20 % external solution   Commonly known as:  DRYSOL   This may have changed:    - when to take this  - reasons to take this   Used for:  Rash, Intertrigo        Apply topically At Bedtime   Quantity:  60 mL   Refills:  3       calcitRIOL 0.25 MCG capsule   Commonly known as:  ROCALTROL   This may have changed:  additional instructions   Used for:  Postsurgical hypothyroidism, Papillary carcinoma, follicular variant (H), Metastasis to cervical lymph node (H)        TAKE 2 CAPSULES BY MOUTH EVERY MORNING AND TAKE 1 CAPSULE BY MOUTH EVERY NIGHT AT BEDTIME   Quantity:  270 capsule   Refills:  3       diazepam 5 MG tablet   Commonly known as:  VALIUM   This may have changed:    - when to take this  - additional instructions   Used for:  Chest wall pain        Dose:  5 mg   Take 1 tablet (5 mg) by mouth 3 times daily as needed For muscle spasms   Quantity:  90 tablet   Refills:  2       docusate sodium 100 MG tablet   Commonly known as:  COLACE   This may have changed:    - when to take this  - reasons to take this   Used for:  Acute constipation        Dose:  100 mg   Take 100 mg by mouth daily   Quantity:  30 tablet   Refills:  0       levothyroxine 112 MCG tablet   Commonly known as:  SYNTHROID/LEVOTHROID   This may have changed:    - how much to take  - additional instructions   Used for:  Postsurgical hypothyroidism, Papillary carcinoma, follicular variant (H), Postsurgical hypoparathyroidism (H), Thyroid cancer (H)        Mon-Sat: (2 tabs daily) Sunday: 3 tabs   Quantity:  189 tablet   Refills:  3       methocarbamol 750 MG tablet   Commonly known as:  ROBAXIN   This may have changed:    - when to take this  - additional instructions   Used for:  Myofascial pain        Dose:  750 mg   Take 1 tablet (750 mg) by mouth 4 times daily as needed . Ok to take a 5th Robaxin on very severe days.   Quantity:  360 tablet   Refills:  1        mometasone 110 MCG/INH inhaler   Commonly known as:  ASMANEX 30 METERED DOSES   This may have changed:    - when to take this  - reasons to take this   Used for:  Intermittent asthma, uncomplicated        Dose:  1 puff   Inhale 1 puff into the lungs daily   Quantity:  3 Inhaler   Refills:  3       montelukast 10 MG tablet   Commonly known as:  SINGULAIR   This may have changed:  how much to take   Used for:  Idiopathic mast cell activation syndrome (H)        Dose:  10 mg   Take 1 tablet (10 mg) by mouth 2 times daily   Quantity:  60 tablet   Refills:  0       polyethylene glycol powder   Commonly known as:  MIRALAX   This may have changed:    - when to take this  - reasons to take this   Used for:  Acute constipation        Dose:  1 capful   Take 17 g (1 capful) by mouth daily   Quantity:  510 g   Refills:  0                Recent Review Flowsheet Data     BMT Recent Results Latest Ref Rng & Units 11/17/2017 11/18/2017 11/19/2017 11/20/2017 11/21/2017 11/24/2017 11/28/2017    WBC 4.0 - 11.0 10e9/L 8.0 13.4(H) 10.0 6.7 5.5 32.9(H) 2.1(L)    Hemoglobin 11.7 - 15.7 g/dL 9.4(L) 9.1(L) 9.7(L) 9.1(L) 9.1(L) 9.5(L) 9.0(L)    Platelet Count 150 - 450 10e9/L 303 330 383 342 335 314 110(L)    Neutrophils (Absolute) 1.6 - 8.3 10e9/L 7.4 12.6(H) 9.5(H) 6.5 5.3 32.9(H) 0.8(L)    INR 0.86 - 1.14 - - - - - - -    Sodium 133 - 144 mmol/L 142 143 143 142 141 137 139    Potassium 3.4 - 5.3 mmol/L 4.0 4.0 3.5 3.5 3.6 3.1(L) 4.4    Chloride 94 - 109 mmol/L 107 107 104 100 100 100 102    Glucose 70 - 99 mg/dL 168(H) 161(H) 209(H) 146(H) 154(H) 173(H) 101(H)    Urea Nitrogen 7 - 30 mg/dL 12 14 20 22 27 25 18    Creatinine 0.52 - 1.04 mg/dL 0.70 0.70 0.74 0.71 0.73 0.60 0.89    Calcium (Total) 8.5 - 10.1 mg/dL 8.3(L) 8.2(L) 8.1(L) 7.9(L) 7.7(L) 7.5(L) 8.3(L)    Protein (Total) 6.8 - 8.8 g/dL 5.8(L) 5.8(L) 6.3(L) 5.6(L) 5.8(L) 6.3(L) 6.1(L)    Albumin 3.4 - 5.0 g/dL 2.5(L) 2.6(L) 2.7(L) 2.6(L) 2.7(L) 3.1(L) 3.1(L)    Alkaline Phosphatase  40 - 150 U/L 85 80 79 64 62 81 63    AST 0 - 45 U/L 13 12 15 16 12 10 21    ALT 0 - 50 U/L 23 21 23 20 22 23 30    MCV 78 - 100 fl 89 90 89 88 87 90 90               Primary Care Provider Office Phone # Fax #    Shahla Raul Crawley [phone]       [address] [facility] [address]  [facility] [address]        Equal Access to Services     RODRIGO ZAMORA : Hadii aad ku hadasho Soomaali, waaxda luqadaha, qaybta kaalmada adeegyada, waxay idiin hayaan adeeg kharash la'aan ah. So [facility] [phone].    ATENCIÓN: Si habla español, tiene a stiles disposición servicios gratuitos de asistencia lingüística. Llame al [phone].    We comply with applicable federal civil rights laws and [place] laws. We do not discriminate on the basis of race, color, national origin, age, disability, sex, sexual orientation, or gender identity.            Thank you!     Thank you for choosing [facility]OD [facility]PL[facility]  for your care. Our goal is always to provide you with excellent care. Hearing back from our patients is one way we can continue to improve our services. Please take a few minutes to complete the written survey that you may receive in the mail after your visit with us. Thank you!             Your Updated Medication List - Protect others around you: Learn how to safely use, store and throw away your medicines at www.disposemymeds.org.          This list is accurate as of: 11/28/17 11:59 PM.  Always use your most recent med list.                   Brand Name Dispense Instructions for use Diagnosis    acyclovir 400 MG tablet    ZOVIRAX    60 tablet    Take 1 tablet (400 mg) by mouth 2 times daily    High grade B-cell lymphoma (H)       allopurinol 300 MG tablet    ZYLOPRIM     Take 300 mg by mouth daily        aluminum chloride 20 % external solution    DRYSOL    60 mL    Apply topically At Bedtime    Rash, Intertrigo       AZMACORT IN      Inhale 2 puffs into the lungs as needed        bisacodyl 5 MG EC tablet     30  tablet    Take 3 tablets (15 mg) by mouth daily as needed for constipation    Constipation, unspecified constipation type       calcitRIOL 0.25 MCG capsule    ROCALTROL    270 capsule    TAKE 2 CAPSULES BY MOUTH EVERY MORNING AND TAKE 1 CAPSULE BY MOUTH EVERY NIGHT AT BEDTIME    Postsurgical hypothyroidism, Papillary carcinoma, follicular variant (H), Metastasis to cervical lymph node (H)       CALCIUM CITRATE +D 315-250 MG-UNIT Tabs per tablet   Generic drug:  calcium citrate-vitamin D     120 tablet    Take 2 tablets by mouth twice a week        celecoxib 200 MG capsule    celeBREX    56 capsule    TAKE ONE CAPSULE BY MOUTH TWICE DAILY    Chest wall pain       cetirizine 10 MG tablet    ZYRTEC ALLERGY    30 tablet    Take 1 tablet (10 mg) by mouth 3 times daily On hold for lab test.    High grade B-cell lymphoma (H)       COMPOUND CONTAINING CONTROLLED SUBSTANCE - PHARMACY TO MIX COMPOUNDED MEDICATION    CMPD RX    120 g    Apply small amount to affected area two times daily.    Neoplasm related pain       cromolyn 4 % ophthalmic solution    OPTICROM    10 mL    Place 1 drop into both eyes 4 times daily    Idiopathic mast cell activation syndrome (H)       cyclobenzaprine 10 MG tablet    FLEXERIL    90 tablet    Take 1 tablet (10 mg) by mouth 3 times daily as needed    High grade B-cell lymphoma (H)       dexamethasone 4 MG tablet    DECADRON    4 tablet    Take 2 tablets (8 mg) by mouth daily for 2 days On Wed 11/22 and Thursday 11/23    High grade B-cell lymphoma (H)       diazepam 5 MG tablet    VALIUM    90 tablet    Take 1 tablet (5 mg) by mouth 3 times daily as needed For muscle spasms    Chest wall pain       diclofenac 1 % Gel topical gel    VOLTAREN    100 g    Apply affected area two times daily PRN using enclosed dosing card.    Myofascial pain       diphenhydrAMINE 50 MG capsule    BENADRYL    56 capsule    Take 1 capsule (50 mg) by mouth nightly as needed for itching or sleep    High grade B-cell  lymphoma (H)       docusate sodium 100 MG tablet    COLACE    30 tablet    Take 100 mg by mouth daily    Acute constipation       EPINEPHrine 0.3 MG/0.3ML injection 2-pack    EPIPEN 2-CARIDAD    0.6 mL    Inject 0.3 mLs (0.3 mg) into the muscle once as needed for anaphylaxis        ferrous sulfate 325 (65 FE) MG tablet    IRON    90 tablet    Take 1 tablet (325 mg) by mouth 3 times daily (with meals)    Status post bariatric surgery       fluconazole 200 MG tablet    DIFLUCAN    30 tablet    Take 1 tablet (200 mg) by mouth daily    High grade B-cell lymphoma (H)       furosemide 20 MG tablet    LASIX    60 tablet    Take 1 tablet (20 mg) by mouth 2 times daily    Localized edema       hydrochlorothiazide 12.5 MG capsule    MICROZIDE    90 capsule    Take 1 capsule (12.5 mg) by mouth daily    Hypocalcemia       levofloxacin 250 MG tablet    LEVAQUIN    30 tablet    Take 1 tablet (250 mg) by mouth daily    High grade B-cell lymphoma (H)       levothyroxine 112 MCG tablet    SYNTHROID/LEVOTHROID    189 tablet    Mon-Sat: (2 tabs daily) Sunday: 3 tabs    Postsurgical hypothyroidism, Papillary carcinoma, follicular variant (H), Postsurgical hypoparathyroidism (H), Thyroid cancer (H)       lidocaine 5 % ointment    XYLOCAINE    350 g    Apply quarter size amount to chest and back up to 3 times daily as needed for pain.    Chest wall pain       lidocaine visc 2% 2.5mL/5mL & maalox/mylanta w/ simeth 2.5mL/5mL & diphenhydrAMINE 5mg/5mL Susp suspension    [facility]    360 mL    Swish and spit 10 mLs in mouth every 6 hours as needed for mouth sores    Stomatitis and mucositis, High grade B-cell lymphoma (H)       lidocaine-prilocaine cream    EMLA    30 g    Apply topically as needed (port access pain.)    Neoplasm related pain, Chest wall pain       methocarbamol 750 MG tablet    ROBAXIN    360 tablet    Take 1 tablet (750 mg) by mouth 4 times daily as needed . Ok to take a 5th Robaxin on very severe days.     Myofascial pain       mometasone 110 MCG/INH inhaler    ASMANEX 30 METERED DOSES    3 Inhaler    Inhale 1 puff into the lungs daily    Intermittent asthma, uncomplicated       montelukast 10 MG tablet    SINGULAIR    60 tablet    Take 1 tablet (10 mg) by mouth 2 times daily    Idiopathic mast cell activation syndrome (H)       * MS CONTIN PO      Take 60 mg by mouth daily Takes at 8pm        * morphine 30 MG 12 hr tablet   Start taking on:  11/30/2017    MS CONTIN    120 tablet    Take 1 tablet (30 mg) by mouth every 8 hours . Take an addition pill at night such that your evening dose is 60mg    Neoplasm related pain       NASALCROM 5.2 MG/ACT Aers Inhaler   Generic drug:  cromolyn sodium     1 Bottle    SPRAY ONE SPRAY( 1 ML) IN NOSTRIL DAILY    Mast cell disease, systemic       ondansetron 8 MG ODT tab    ZOFRAN-ODT    30 tablet    Take 1 tablet (8 mg) by mouth every 8 hours as needed    High grade B-cell lymphoma (H), Nausea and vomiting, intractability of vomiting not specified, unspecified vomiting type       * order for DME     2 Units    Equipment being ordered: compression stockings. 20-30 mm or 30 - 40 mm as patient can tolerate. Physical therapy to determine if they should be above or below the knee.    Venous stasis       * order for DME     2 Device    Equipment being ordered: compression bra    Malignant neoplasm of right female breast, unspecified site of breast       * order for DME     1 Units    Compression stockings, medium grade, please measure and fit. Please dispense a pair.    Peripheral edema       oxyCODONE IR 30 MG tablet    ROXICODONE    180 tablet    Take 1 tablet (30 mg) by mouth every 4 hours as needed for moderate to severe pain    High grade B-cell lymphoma (H)       polyethylene glycol powder    MIRALAX    510 g    Take 17 g (1 capful) by mouth daily    Acute constipation       potassium chloride SA 20 MEQ CR tablet    KLOR-CON    90 tablet    Take 1 tablet (20 mEq) by mouth daily     Hypokalemia       PROBIOTIC DAILY PO      Take 1 capsule by mouth daily Lacto acid bifidobacterium    Breast cancer, unspecified laterality, Thyroid cancer (H), Chronic arthralgias of knees and hips, unspecified laterality       prochlorperazine 10 MG tablet    COMPAZINE    60 tablet    Take 1 tablet (10 mg) by mouth every 6 hours as needed for nausea or vomiting    High grade B-cell lymphoma (H), Nausea       ranitidine 75 MG tablet    ZANTAC    270 tablet    Take 1 tablet (75 mg) by mouth 3 times daily    Mast cell disease, systemic       senna-docusate 8.6-50 MG per tablet    SENOKOT-S;PERICOLACE    60 tablet    Take 1-2 tablets by mouth 2 times daily as needed for constipation    Acute constipation       SUMAtriptan 50 MG tablet    IMITREX    9 tablet    Take 1 tablet (50 mg) by mouth at onset of headache for migraine (may repeat in 2 hours as needed, max 2 tablets daily)    Migraine without status migrainosus, not intractable, unspecified migraine type       triamcinolone 0.025 % ointment    KENALOG     Apply topically as needed        TUMS 500 MG chewable tablet   Generic drug:  calcium carbonate     180 chew tab    Take 2 tablets (1,000 mg) by mouth nightly as needed for other (cramps)        * UNABLE TO FIND      3 tablets 3 times daily MEDICATION NAME: calcium D-Glucarate  3 caps contain 180mg of elemental calcium.        * UNABLE TO FIND      2 tablets 3 times daily MEDICATION NAME: Natural D-Hist (on hold during chemo)    Breast cancer, unspecified laterality, Papillary carcinoma, follicular variant (H), Chronic musculoskeletal pain       * UNABLE TO FIND      MEDICATION NAME: Tumeric 3 capsules daily (on hold during chemo)        * UNABLE TO FIND      Take 2 capsules by mouth 3 times daily Muscle Mag. 2 caps contain B1 20mg, B2 20mg, B6 10mg, magesium 20mg, manganese 2mg.        * UNABLE TO FIND      2 tablets 3 times daily MEDICATION NAME: Digestzymes    Thyroid cancer (H), Postsurgical hypothyroidism,  Postsurgical hypoparathyroidism (H)       * UNABLE TO FIND      1 tablet daily MEDICATION NAME: Pure Encapsulations    Thyroid cancer (H), Postsurgical hypothyroidism, Postsurgical hypoparathyroidism (H)       VENTOLIN  (90 BASE) MCG/ACT Inhaler   Generic drug:  albuterol     18 g    INHALE 2 PUFFS INTO THE LUNGS EVERY 4 HOURS AS NEEDED FOR SHORTNESS OF BREATH OR DIFFICULT BREATHING OR WHEEZING    Mild intermittent asthma without complication       vitamin D3 2000 UNITS Caps     60 capsule    Take 5,000 Units by mouth daily Takes 2 tabs daily    Thyroid cancer (H), Postsurgical hypothyroidism, Papillary carcinoma, follicular variant (H), Postsurgical hypoparathyroidism (H)       * Notice:  This list has 11 medication(s) that are the same as other medications prescribed for you. Read the directions carefully, and ask your doctor or other care provider to review them with you.

## 2017-11-28 NOTE — NURSING NOTE
"Chief Complaint   Patient presents with     Port Draw     Labs drawn from port by RN. Line flushed with saline and heparin. Vs taken and pt checked in for appt     Port accessed with 20g 3/4\" gripper needle by RN, labs collected, line flushed with saline and heparin.  Vitals taken. Pt checked in for appointment(s).    Daly Hooper RN  "

## 2017-11-29 ENCOUNTER — TELEPHONE (OUTPATIENT)
Dept: PALLIATIVE CARE | Facility: CLINIC | Age: 57
End: 2017-11-29

## 2017-11-29 NOTE — TELEPHONE ENCOUNTER
Received VM from patient last night at about 6PM. She reports Dr. Sauceda adjusted her morphine dose and she would prefer if this wasn't changed. She seemed to be talking very slowly in her VM, almost impaired? She noted that her daughter is periodically assessing her ability to drive safely and that she wouldn't drive if she felt she couldn't.    Reviewed note from Dr. Sauceda, there was concern patient was impaired at her apt yesterday. Dr. Sauceda instructed her to back down on her PRN pain meds and advised her that she had concern about driving/working. Discussed with Dr. Benitez. Called to check in with patient, but unable to reach her. Left VM for patient offering follow up apt with MD this Friday at 7AM, following her lab apt - otherwise plan is following up with a palliative MD while she is admitted for chemo next week.

## 2017-12-01 ENCOUNTER — TELEPHONE (OUTPATIENT)
Dept: ONCOLOGY | Facility: CLINIC | Age: 57
End: 2017-12-01

## 2017-12-01 ENCOUNTER — TELEPHONE (OUTPATIENT)
Dept: ENDOCRINOLOGY | Facility: CLINIC | Age: 57
End: 2017-12-01

## 2017-12-01 DIAGNOSIS — C85.10 HIGH GRADE B-CELL LYMPHOMA (H): ICD-10-CM

## 2017-12-01 DIAGNOSIS — C73 PAPILLARY CARCINOMA, FOLLICULAR VARIANT (H): ICD-10-CM

## 2017-12-01 DIAGNOSIS — C77.0 METASTASIS TO CERVICAL LYMPH NODE (H): ICD-10-CM

## 2017-12-01 DIAGNOSIS — E89.0 POSTSURGICAL HYPOTHYROIDISM: ICD-10-CM

## 2017-12-01 LAB
ALBUMIN SERPL-MCNC: 2.8 G/DL (ref 3.4–5)
ALP SERPL-CCNC: 84 U/L (ref 40–150)
ALT SERPL W P-5'-P-CCNC: 25 U/L (ref 0–50)
ANION GAP SERPL CALCULATED.3IONS-SCNC: 9 MMOL/L (ref 3–14)
ANISOCYTOSIS BLD QL SMEAR: SLIGHT
AST SERPL W P-5'-P-CCNC: 17 U/L (ref 0–45)
BASOPHILS # BLD AUTO: 0.1 10E9/L (ref 0–0.2)
BASOPHILS NFR BLD AUTO: 0.9 %
BILIRUB SERPL-MCNC: 0.2 MG/DL (ref 0.2–1.3)
BUN SERPL-MCNC: 16 MG/DL (ref 7–30)
CALCIUM SERPL-MCNC: 8.1 MG/DL (ref 8.5–10.1)
CHLORIDE SERPL-SCNC: 109 MMOL/L (ref 94–109)
CO2 SERPL-SCNC: 26 MMOL/L (ref 20–32)
CREAT SERPL-MCNC: 0.83 MG/DL (ref 0.52–1.04)
DIFFERENTIAL METHOD BLD: ABNORMAL
EOSINOPHIL # BLD AUTO: 0 10E9/L (ref 0–0.7)
EOSINOPHIL NFR BLD AUTO: 0 %
ERYTHROCYTE [DISTWIDTH] IN BLOOD BY AUTOMATED COUNT: 25.2 % (ref 10–15)
GFR SERPL CREATININE-BSD FRML MDRD: 70 ML/MIN/1.7M2
GLUCOSE SERPL-MCNC: 114 MG/DL (ref 70–99)
HCT VFR BLD AUTO: 28.1 % (ref 35–47)
HGB BLD-MCNC: 8.6 G/DL (ref 11.7–15.7)
LYMPHOCYTES # BLD AUTO: 0.6 10E9/L (ref 0.8–5.3)
LYMPHOCYTES NFR BLD AUTO: 8 %
MCH RBC QN AUTO: 28.5 PG (ref 26.5–33)
MCHC RBC AUTO-ENTMCNC: 30.6 G/DL (ref 31.5–36.5)
MCV RBC AUTO: 93 FL (ref 78–100)
METAMYELOCYTES # BLD: 0.1 10E9/L
METAMYELOCYTES NFR BLD MANUAL: 0.9 %
MICROCYTES BLD QL SMEAR: PRESENT
MONOCYTES # BLD AUTO: 0.4 10E9/L (ref 0–1.3)
MONOCYTES NFR BLD AUTO: 5.4 %
MYELOCYTES # BLD: 0.1 10E9/L
MYELOCYTES NFR BLD MANUAL: 1.8 %
NEUTROPHILS # BLD AUTO: 6.4 10E9/L (ref 1.6–8.3)
NEUTROPHILS NFR BLD AUTO: 79.4 %
NRBC # BLD AUTO: 0.3 10*3/UL
NRBC BLD AUTO-RTO: 4 /100
OVALOCYTES BLD QL SMEAR: SLIGHT
PLATELET # BLD AUTO: 89 10E9/L (ref 150–450)
POIKILOCYTOSIS BLD QL SMEAR: ABNORMAL
POLYCHROMASIA BLD QL SMEAR: ABNORMAL
POTASSIUM SERPL-SCNC: 4.4 MMOL/L (ref 3.4–5.3)
PROMYELOCYTES # BLD MANUAL: 0.3 10E9/L
PROMYELOCYTES NFR BLD MANUAL: 3.6 %
PROT SERPL-MCNC: 5.5 G/DL (ref 6.8–8.8)
RBC # BLD AUTO: 3.02 10E12/L (ref 3.8–5.2)
SODIUM SERPL-SCNC: 145 MMOL/L (ref 133–144)
WBC # BLD AUTO: 8 10E9/L (ref 4–11)

## 2017-12-01 PROCEDURE — 85025 COMPLETE CBC W/AUTO DIFF WBC: CPT | Performed by: PHYSICIAN ASSISTANT

## 2017-12-01 PROCEDURE — 80053 COMPREHEN METABOLIC PANEL: CPT | Performed by: PHYSICIAN ASSISTANT

## 2017-12-01 PROCEDURE — 25000128 H RX IP 250 OP 636

## 2017-12-01 RX ORDER — HEPARIN SODIUM (PORCINE) LOCK FLUSH IV SOLN 100 UNIT/ML 100 UNIT/ML
5 SOLUTION INTRAVENOUS ONCE
Status: COMPLETED | OUTPATIENT
Start: 2017-12-01 | End: 2017-12-01

## 2017-12-01 RX ADMIN — SODIUM CHLORIDE, PRESERVATIVE FREE 5 ML: 5 INJECTION INTRAVENOUS at 06:45

## 2017-12-01 NOTE — TELEPHONE ENCOUNTER
I called and left patient a message regarding her labs. She is no longer neutropenic. I advised her to stop taking the Levaquin and fluconazole for now. Continue on acyclovir.   Jessica Cox PA-C  [facility]  [address] [place] [id number] [phone]

## 2017-12-01 NOTE — NURSING NOTE
Chief Complaint   Patient presents with     Port Draw     labs drawn via port by RN     There were no vitals taken for this visit.    Port accessed by RN. Labs drawn and sent. Line flushed with NS & Heparin. Pt tolerated well.     Crystal Saba

## 2017-12-01 NOTE — TELEPHONE ENCOUNTER
Social Work Note: Telephone Call  Oncology Clinic    Data/Intervention:  Patient Name:  Tisha Arias  /Age:  1960 (57 year old)    Call From:  Social work attempted to call patient.  Reason for Call:  Social work received referral from palliative care RNCC indicating patient would like information about community resources/services.    Assessment:  Social work attempted to call patient at listed phone number to provide information about community support services.  Voicemail indicated patient's mailbox is full and no longer accepting messages.    Plan:  Social work will attempt again later to call patient and is available to assist with any identified needs.    Soo Yeon Han LICSW  2017  Pager: [phone]  Phone Number: [phone]

## 2017-12-04 DIAGNOSIS — C85.10 HIGH GRADE B-CELL LYMPHOMA (H): ICD-10-CM

## 2017-12-04 LAB
ALBUMIN SERPL-MCNC: 2.8 G/DL (ref 3.4–5)
ALP SERPL-CCNC: 94 U/L (ref 40–150)
ALT SERPL W P-5'-P-CCNC: 29 U/L (ref 0–50)
ANION GAP SERPL CALCULATED.3IONS-SCNC: 7 MMOL/L (ref 3–14)
ANISOCYTOSIS BLD QL SMEAR: ABNORMAL
AST SERPL W P-5'-P-CCNC: 27 U/L (ref 0–45)
BASOPHILS # BLD AUTO: 0 10E9/L (ref 0–0.2)
BASOPHILS NFR BLD AUTO: 0 %
BILIRUB SERPL-MCNC: 0.3 MG/DL (ref 0.2–1.3)
BUN SERPL-MCNC: 13 MG/DL (ref 7–30)
CALCIUM SERPL-MCNC: 8.4 MG/DL (ref 8.5–10.1)
CHLORIDE SERPL-SCNC: 107 MMOL/L (ref 94–109)
CO2 SERPL-SCNC: 28 MMOL/L (ref 20–32)
CREAT SERPL-MCNC: 0.88 MG/DL (ref 0.52–1.04)
DIFFERENTIAL METHOD BLD: ABNORMAL
EOSINOPHIL # BLD AUTO: 0 10E9/L (ref 0–0.7)
EOSINOPHIL NFR BLD AUTO: 0 %
ERYTHROCYTE [DISTWIDTH] IN BLOOD BY AUTOMATED COUNT: 25.3 % (ref 10–15)
GFR SERPL CREATININE-BSD FRML MDRD: 67 ML/MIN/1.7M2
GLUCOSE SERPL-MCNC: 100 MG/DL (ref 70–99)
HCT VFR BLD AUTO: 29.2 % (ref 35–47)
HGB BLD-MCNC: 8.8 G/DL (ref 11.7–15.7)
LYMPHOCYTES # BLD AUTO: 0.3 10E9/L (ref 0.8–5.3)
LYMPHOCYTES NFR BLD AUTO: 3.5 %
MCH RBC QN AUTO: 28.8 PG (ref 26.5–33)
MCHC RBC AUTO-ENTMCNC: 30.1 G/DL (ref 31.5–36.5)
MCV RBC AUTO: 95 FL (ref 78–100)
METAMYELOCYTES # BLD: 0.2 10E9/L
METAMYELOCYTES NFR BLD MANUAL: 1.7 %
MONOCYTES # BLD AUTO: 0.2 10E9/L (ref 0–1.3)
MONOCYTES NFR BLD AUTO: 2.6 %
MYELOCYTES # BLD: 0.1 10E9/L
MYELOCYTES NFR BLD MANUAL: 0.9 %
NEUTROPHILS # BLD AUTO: 8.2 10E9/L (ref 1.6–8.3)
NEUTROPHILS NFR BLD AUTO: 91.3 %
NRBC # BLD AUTO: 0.2 10*3/UL
NRBC BLD AUTO-RTO: 2 /100
PLATELET # BLD AUTO: 129 10E9/L (ref 150–450)
PLATELET # BLD EST: ABNORMAL 10*3/UL
POIKILOCYTOSIS BLD QL SMEAR: SLIGHT
POLYCHROMASIA BLD QL SMEAR: SLIGHT
POTASSIUM SERPL-SCNC: 4.1 MMOL/L (ref 3.4–5.3)
PROT SERPL-MCNC: 5.7 G/DL (ref 6.8–8.8)
RBC # BLD AUTO: 3.06 10E12/L (ref 3.8–5.2)
SODIUM SERPL-SCNC: 143 MMOL/L (ref 133–144)
WBC # BLD AUTO: 9 10E9/L (ref 4–11)

## 2017-12-04 PROCEDURE — 80053 COMPREHEN METABOLIC PANEL: CPT | Performed by: PHYSICIAN ASSISTANT

## 2017-12-04 PROCEDURE — 25000128 H RX IP 250 OP 636

## 2017-12-04 PROCEDURE — 85025 COMPLETE CBC W/AUTO DIFF WBC: CPT | Performed by: PHYSICIAN ASSISTANT

## 2017-12-04 RX ORDER — HEPARIN SODIUM (PORCINE) LOCK FLUSH IV SOLN 100 UNIT/ML 100 UNIT/ML
5 SOLUTION INTRAVENOUS
Status: COMPLETED | OUTPATIENT
Start: 2017-12-04 | End: 2017-12-04

## 2017-12-04 RX ADMIN — SODIUM CHLORIDE, PRESERVATIVE FREE 5 ML: 5 INJECTION INTRAVENOUS at 06:35

## 2017-12-04 NOTE — NURSING NOTE
"Chief Complaint   Patient presents with     Port Draw     labs drawn from port by rn.     Port accessed with 20 gauge 3/4\" gripper needle and labs drawn by rn.  Port flushed with NS and heparin; port de-accessed.  Pt tolerated well.    Basilia Schulz RN      "

## 2017-12-05 ENCOUNTER — CARE COORDINATION (OUTPATIENT)
Dept: ONCOLOGY | Facility: CLINIC | Age: 57
End: 2017-12-05

## 2017-12-05 DIAGNOSIS — C85.10 HIGH GRADE B-CELL LYMPHOMA (H): ICD-10-CM

## 2017-12-05 LAB
ALBUMIN SERPL-MCNC: 2.9 G/DL (ref 3.4–5)
ALP SERPL-CCNC: 91 U/L (ref 40–150)
ALT SERPL W P-5'-P-CCNC: 29 U/L (ref 0–50)
ANION GAP SERPL CALCULATED.3IONS-SCNC: 7 MMOL/L (ref 3–14)
ANISOCYTOSIS BLD QL SMEAR: ABNORMAL
AST SERPL W P-5'-P-CCNC: 23 U/L (ref 0–45)
BASOPHILS # BLD AUTO: 0 10E9/L (ref 0–0.2)
BASOPHILS NFR BLD AUTO: 0 %
BILIRUB SERPL-MCNC: 0.2 MG/DL (ref 0.2–1.3)
BUN SERPL-MCNC: 12 MG/DL (ref 7–30)
CALCIUM SERPL-MCNC: 8 MG/DL (ref 8.5–10.1)
CHLORIDE SERPL-SCNC: 103 MMOL/L (ref 94–109)
CO2 SERPL-SCNC: 31 MMOL/L (ref 20–32)
CREAT SERPL-MCNC: 0.79 MG/DL (ref 0.52–1.04)
DACRYOCYTES BLD QL SMEAR: SLIGHT
DIFFERENTIAL METHOD BLD: ABNORMAL
EOSINOPHIL # BLD AUTO: 0.1 10E9/L (ref 0–0.7)
EOSINOPHIL NFR BLD AUTO: 0.9 %
ERYTHROCYTE [DISTWIDTH] IN BLOOD BY AUTOMATED COUNT: 25.4 % (ref 10–15)
GFR SERPL CREATININE-BSD FRML MDRD: 75 ML/MIN/1.7M2
GLUCOSE SERPL-MCNC: 111 MG/DL (ref 70–99)
HCT VFR BLD AUTO: 30.7 % (ref 35–47)
HGB BLD-MCNC: 9.4 G/DL (ref 11.7–15.7)
LYMPHOCYTES # BLD AUTO: 0.6 10E9/L (ref 0.8–5.3)
LYMPHOCYTES NFR BLD AUTO: 8.8 %
MCH RBC QN AUTO: 29.1 PG (ref 26.5–33)
MCHC RBC AUTO-ENTMCNC: 30.6 G/DL (ref 31.5–36.5)
MCV RBC AUTO: 95 FL (ref 78–100)
MONOCYTES # BLD AUTO: 0.4 10E9/L (ref 0–1.3)
MONOCYTES NFR BLD AUTO: 5.3 %
MYELOCYTES # BLD: 0.1 10E9/L
MYELOCYTES NFR BLD MANUAL: 0.9 %
NEUTROPHILS # BLD AUTO: 5.8 10E9/L (ref 1.6–8.3)
NEUTROPHILS NFR BLD AUTO: 84.1 %
OVALOCYTES BLD QL SMEAR: SLIGHT
PLATELET # BLD AUTO: 173 10E9/L (ref 150–450)
PLATELET # BLD EST: ABNORMAL 10*3/UL
POIKILOCYTOSIS BLD QL SMEAR: SLIGHT
POLYCHROMASIA BLD QL SMEAR: SLIGHT
POTASSIUM SERPL-SCNC: 3.8 MMOL/L (ref 3.4–5.3)
PROT SERPL-MCNC: 6 G/DL (ref 6.8–8.8)
RBC # BLD AUTO: 3.23 10E12/L (ref 3.8–5.2)
SODIUM SERPL-SCNC: 141 MMOL/L (ref 133–144)
WBC # BLD AUTO: 6.9 10E9/L (ref 4–11)

## 2017-12-05 PROCEDURE — 85025 COMPLETE CBC W/AUTO DIFF WBC: CPT | Performed by: PHYSICIAN ASSISTANT

## 2017-12-05 PROCEDURE — 80053 COMPREHEN METABOLIC PANEL: CPT | Performed by: PHYSICIAN ASSISTANT

## 2017-12-05 NOTE — PROGRESS NOTES
RN Care Coordination Note  Reason for Outgoing Call:   To notify pt of today's lab results per EV Cox's review/request  Nursing Action/Patient Instruction:  - Notified pt of today's lab results  - confirmed with pt SANJANA visit 12/7 am followed by 0830 direct admit for cycle 4 inpt chemo  - provided oncology dietitian dept phone # per pt's request  Patient Response/Evaluation:   Pt voiced understanding of above instructions and information and denied further questions    Laila Barrett, RN, BSN, OCN  Care Coordinator  [facility]

## 2017-12-05 NOTE — NURSING NOTE
Chief Complaint   Patient presents with     Blood Draw     Labs drawn via  by CASSIDY.      Ila Navarro RN

## 2017-12-07 ENCOUNTER — APPOINTMENT (OUTPATIENT)
Dept: ULTRASOUND IMAGING | Facility: CLINIC | Age: 57
End: 2017-12-07
Attending: NURSE PRACTITIONER
Payer: COMMERCIAL

## 2017-12-07 ENCOUNTER — ONCOLOGY VISIT (OUTPATIENT)
Dept: ONCOLOGY | Facility: CLINIC | Age: 57
End: 2017-12-07
Attending: PHYSICIAN ASSISTANT
Payer: COMMERCIAL

## 2017-12-07 ENCOUNTER — RADIANT APPOINTMENT (OUTPATIENT)
Dept: CARDIOLOGY | Facility: CLINIC | Age: 57
End: 2017-12-07
Attending: PHYSICIAN ASSISTANT

## 2017-12-07 ENCOUNTER — HOSPITAL ENCOUNTER (INPATIENT)
Facility: CLINIC | Age: 57
LOS: 4 days | Discharge: HOME | End: 2017-12-11
Attending: INTERNAL MEDICINE | Admitting: INTERNAL MEDICINE
Payer: COMMERCIAL

## 2017-12-07 VITALS
SYSTOLIC BLOOD PRESSURE: 118 MMHG | DIASTOLIC BLOOD PRESSURE: 75 MMHG | WEIGHT: 190.3 LBS | HEIGHT: 63 IN | TEMPERATURE: 97.7 F | HEART RATE: 130 BPM | BODY MASS INDEX: 33.72 KG/M2 | OXYGEN SATURATION: 100 %

## 2017-12-07 DIAGNOSIS — R11.2 NAUSEA AND VOMITING, INTRACTABILITY OF VOMITING NOT SPECIFIED, UNSPECIFIED VOMITING TYPE: ICD-10-CM

## 2017-12-07 DIAGNOSIS — M79.89 LEG SWELLING: ICD-10-CM

## 2017-12-07 DIAGNOSIS — C85.10 HIGH GRADE B-CELL LYMPHOMA (H): Primary | ICD-10-CM

## 2017-12-07 DIAGNOSIS — C85.10 HIGH GRADE B-CELL LYMPHOMA (H): ICD-10-CM

## 2017-12-07 DIAGNOSIS — R63.5 WEIGHT GAIN: ICD-10-CM

## 2017-12-07 DIAGNOSIS — Z98.84 STATUS POST BARIATRIC SURGERY: ICD-10-CM

## 2017-12-07 DIAGNOSIS — M79.18 MYOFASCIAL PAIN: ICD-10-CM

## 2017-12-07 LAB
ALBUMIN SERPL-MCNC: 3 G/DL (ref 3.4–5)
ALP SERPL-CCNC: 89 U/L (ref 40–150)
ALT SERPL W P-5'-P-CCNC: 27 U/L (ref 0–50)
ANION GAP SERPL CALCULATED.3IONS-SCNC: 9 MMOL/L (ref 3–14)
AST SERPL W P-5'-P-CCNC: 26 U/L (ref 0–45)
BASOPHILS # BLD AUTO: 0.1 10E9/L (ref 0–0.2)
BASOPHILS NFR BLD AUTO: 0.6 %
BILIRUB SERPL-MCNC: 0.3 MG/DL (ref 0.2–1.3)
BUN SERPL-MCNC: 14 MG/DL (ref 7–30)
CALCIUM SERPL-MCNC: 8.4 MG/DL (ref 8.5–10.1)
CHLORIDE SERPL-SCNC: 102 MMOL/L (ref 94–109)
CO2 SERPL-SCNC: 30 MMOL/L (ref 20–32)
CREAT SERPL-MCNC: 0.9 MG/DL (ref 0.52–1.04)
DIFFERENTIAL METHOD BLD: ABNORMAL
EOSINOPHIL # BLD AUTO: 0.1 10E9/L (ref 0–0.7)
EOSINOPHIL NFR BLD AUTO: 1 %
ERYTHROCYTE [DISTWIDTH] IN BLOOD BY AUTOMATED COUNT: 25.8 % (ref 10–15)
GFR SERPL CREATININE-BSD FRML MDRD: 64 ML/MIN/1.7M2
GLUCOSE SERPL-MCNC: 120 MG/DL (ref 70–99)
HCT VFR BLD AUTO: 31.2 % (ref 35–47)
HGB BLD-MCNC: 9.7 G/DL (ref 11.7–15.7)
IMM GRANULOCYTES # BLD: 0.1 10E9/L (ref 0–0.4)
IMM GRANULOCYTES NFR BLD: 1.6 %
LDH SERPL L TO P-CCNC: 382 U/L (ref 81–234)
LYMPHOCYTES # BLD AUTO: 0.7 10E9/L (ref 0.8–5.3)
LYMPHOCYTES NFR BLD AUTO: 8.2 %
MAGNESIUM SERPL-MCNC: 2 MG/DL (ref 1.6–2.3)
MCH RBC QN AUTO: 29.6 PG (ref 26.5–33)
MCHC RBC AUTO-ENTMCNC: 31.1 G/DL (ref 31.5–36.5)
MCV RBC AUTO: 95 FL (ref 78–100)
MONOCYTES # BLD AUTO: 1 10E9/L (ref 0–1.3)
MONOCYTES NFR BLD AUTO: 12 %
NEUTROPHILS # BLD AUTO: 6 10E9/L (ref 1.6–8.3)
NEUTROPHILS NFR BLD AUTO: 76.6 %
NRBC # BLD AUTO: 0.1 10*3/UL
NRBC BLD AUTO-RTO: 1 /100
NT-PROBNP SERPL-MCNC: 39 PG/ML (ref 0–125)
PHOSPHATE SERPL-MCNC: 4.2 MG/DL (ref 2.5–4.5)
PLATELET # BLD AUTO: 296 10E9/L (ref 150–450)
POTASSIUM SERPL-SCNC: 3.4 MMOL/L (ref 3.4–5.3)
PROT SERPL-MCNC: 6.2 G/DL (ref 6.8–8.8)
RBC # BLD AUTO: 3.28 10E12/L (ref 3.8–5.2)
SODIUM SERPL-SCNC: 140 MMOL/L (ref 133–144)
URATE SERPL-MCNC: 6.3 MG/DL (ref 2.6–6)
WBC # BLD AUTO: 7.9 10E9/L (ref 4–11)

## 2017-12-07 PROCEDURE — 83880 ASSAY OF NATRIURETIC PEPTIDE: CPT | Performed by: PHYSICIAN ASSISTANT

## 2017-12-07 PROCEDURE — 25000128 H RX IP 250 OP 636: Performed by: PHYSICIAN ASSISTANT

## 2017-12-07 PROCEDURE — 84100 ASSAY OF PHOSPHORUS: CPT | Performed by: PHYSICIAN ASSISTANT

## 2017-12-07 PROCEDURE — 25000125 ZZHC RX 250: Performed by: NURSE PRACTITIONER

## 2017-12-07 PROCEDURE — 25000125 ZZHC RX 250

## 2017-12-07 PROCEDURE — 25000128 H RX IP 250 OP 636: Performed by: INTERNAL MEDICINE

## 2017-12-07 PROCEDURE — 83615 LACTATE (LD) (LDH) ENZYME: CPT | Performed by: PHYSICIAN ASSISTANT

## 2017-12-07 PROCEDURE — 25000132 ZZH RX MED GY IP 250 OP 250 PS 637: Performed by: NURSE PRACTITIONER

## 2017-12-07 PROCEDURE — 84550 ASSAY OF BLOOD/URIC ACID: CPT | Performed by: PHYSICIAN ASSISTANT

## 2017-12-07 PROCEDURE — 99213 OFFICE O/P EST LOW 20 MIN: CPT | Mod: 25

## 2017-12-07 PROCEDURE — 93970 EXTREMITY STUDY: CPT

## 2017-12-07 PROCEDURE — 99207 ZZC CHGS TRANSFERRED TO HOSPITAL: CPT | Mod: ZP | Performed by: PHYSICIAN ASSISTANT

## 2017-12-07 PROCEDURE — 25000128 H RX IP 250 OP 636

## 2017-12-07 PROCEDURE — 99222 1ST HOSP IP/OBS MODERATE 55: CPT | Performed by: INTERNAL MEDICINE

## 2017-12-07 PROCEDURE — 80053 COMPREHEN METABOLIC PANEL: CPT | Performed by: PHYSICIAN ASSISTANT

## 2017-12-07 PROCEDURE — 25000131 ZZH RX MED GY IP 250 OP 636 PS 637

## 2017-12-07 PROCEDURE — 99212 OFFICE O/P EST SF 10 MIN: CPT | Mod: ZF

## 2017-12-07 PROCEDURE — S0169 CALCITROL: HCPCS | Performed by: NURSE PRACTITIONER

## 2017-12-07 PROCEDURE — 12000008 ZZH R&B INTERMEDIATE UMMC

## 2017-12-07 PROCEDURE — 25000128 H RX IP 250 OP 636: Performed by: NURSE PRACTITIONER

## 2017-12-07 PROCEDURE — 85025 COMPLETE CBC W/AUTO DIFF WBC: CPT | Performed by: PHYSICIAN ASSISTANT

## 2017-12-07 PROCEDURE — 25000132 ZZH RX MED GY IP 250 OP 250 PS 637

## 2017-12-07 PROCEDURE — 83735 ASSAY OF MAGNESIUM: CPT | Performed by: PHYSICIAN ASSISTANT

## 2017-12-07 RX ORDER — MEPERIDINE HYDROCHLORIDE 50 MG/ML
25 INJECTION INTRAMUSCULAR; INTRAVENOUS; SUBCUTANEOUS EVERY 30 MIN PRN
Status: DISCONTINUED | OUTPATIENT
Start: 2017-12-07 | End: 2017-12-11 | Stop reason: HOSPADM

## 2017-12-07 RX ORDER — POTASSIUM CHLORIDE 7.45 MG/ML
10 INJECTION INTRAVENOUS
Status: DISCONTINUED | OUTPATIENT
Start: 2017-12-07 | End: 2017-12-11 | Stop reason: HOSPADM

## 2017-12-07 RX ORDER — DIPHENHYDRAMINE HYDROCHLORIDE 50 MG/ML
50 INJECTION INTRAMUSCULAR; INTRAVENOUS
Status: DISCONTINUED | OUTPATIENT
Start: 2017-12-07 | End: 2017-12-11 | Stop reason: HOSPADM

## 2017-12-07 RX ORDER — POTASSIUM CL/LIDO/0.9 % NACL 10MEQ/0.1L
10 INTRAVENOUS SOLUTION, PIGGYBACK (ML) INTRAVENOUS
Status: DISCONTINUED | OUTPATIENT
Start: 2017-12-07 | End: 2017-12-11 | Stop reason: HOSPADM

## 2017-12-07 RX ORDER — PROCHLORPERAZINE MALEATE 10 MG
10 TABLET ORAL EVERY 6 HOURS PRN
Status: DISCONTINUED | OUTPATIENT
Start: 2017-12-07 | End: 2017-12-07

## 2017-12-07 RX ORDER — ALLOPURINOL 300 MG/1
300 TABLET ORAL DAILY
Status: DISCONTINUED | OUTPATIENT
Start: 2017-12-07 | End: 2017-12-11 | Stop reason: HOSPADM

## 2017-12-07 RX ORDER — POTASSIUM CHLORIDE 29.8 MG/ML
20 INJECTION INTRAVENOUS
Status: DISCONTINUED | OUTPATIENT
Start: 2017-12-07 | End: 2017-12-07 | Stop reason: RX

## 2017-12-07 RX ORDER — MAGNESIUM SULFATE HEPTAHYDRATE 40 MG/ML
4 INJECTION, SOLUTION INTRAVENOUS EVERY 4 HOURS PRN
Status: DISCONTINUED | OUTPATIENT
Start: 2017-12-07 | End: 2017-12-11 | Stop reason: HOSPADM

## 2017-12-07 RX ORDER — POTASSIUM CHLORIDE 750 MG/1
20-40 TABLET, EXTENDED RELEASE ORAL
Status: DISCONTINUED | OUTPATIENT
Start: 2017-12-07 | End: 2017-12-11 | Stop reason: HOSPADM

## 2017-12-07 RX ORDER — AMOXICILLIN 250 MG
2 CAPSULE ORAL 2 TIMES DAILY
Status: DISCONTINUED | OUTPATIENT
Start: 2017-12-07 | End: 2017-12-07

## 2017-12-07 RX ORDER — POLYETHYLENE GLYCOL 3350 17 G/17G
17 POWDER, FOR SOLUTION ORAL DAILY PRN
Status: DISCONTINUED | OUTPATIENT
Start: 2017-12-07 | End: 2017-12-11 | Stop reason: HOSPADM

## 2017-12-07 RX ORDER — ACETAMINOPHEN 650 MG/1
650 SUPPOSITORY RECTAL EVERY 4 HOURS PRN
Status: DISCONTINUED | OUTPATIENT
Start: 2017-12-07 | End: 2017-12-07

## 2017-12-07 RX ORDER — DIPHENHYDRAMINE HYDROCHLORIDE AND LIDOCAINE HYDROCHLORIDE AND ALUMINUM HYDROXIDE AND MAGNESIUM HYDRO
10 KIT EVERY 6 HOURS PRN
Status: DISCONTINUED | OUTPATIENT
Start: 2017-12-07 | End: 2017-12-11 | Stop reason: HOSPADM

## 2017-12-07 RX ORDER — LEVOTHYROXINE SODIUM 112 UG/1
224 TABLET ORAL
Status: DISCONTINUED | OUTPATIENT
Start: 2017-12-07 | End: 2017-12-11 | Stop reason: HOSPADM

## 2017-12-07 RX ORDER — CROMOLYN SODIUM 40 MG/ML
1 SOLUTION/ DROPS OPHTHALMIC 4 TIMES DAILY
Status: DISCONTINUED | OUTPATIENT
Start: 2017-12-07 | End: 2017-12-11 | Stop reason: HOSPADM

## 2017-12-07 RX ORDER — CALCITRIOL 0.5 UG/1
0.5 CAPSULE, LIQUID FILLED ORAL
Status: DISCONTINUED | OUTPATIENT
Start: 2017-12-07 | End: 2017-12-11 | Stop reason: HOSPADM

## 2017-12-07 RX ORDER — MORPHINE SULFATE 30 MG/1
30 TABLET, FILM COATED, EXTENDED RELEASE ORAL 2 TIMES DAILY
Status: DISCONTINUED | OUTPATIENT
Start: 2017-12-07 | End: 2017-12-11 | Stop reason: HOSPADM

## 2017-12-07 RX ORDER — MORPHINE SULFATE 30 MG/1
60 TABLET, FILM COATED, EXTENDED RELEASE ORAL EVERY EVENING
Status: DISCONTINUED | OUTPATIENT
Start: 2017-12-07 | End: 2017-12-11 | Stop reason: HOSPADM

## 2017-12-07 RX ORDER — HEPARIN SODIUM,PORCINE 10 UNIT/ML
5-10 VIAL (ML) INTRAVENOUS EVERY 24 HOURS
Status: DISCONTINUED | OUTPATIENT
Start: 2017-12-07 | End: 2017-12-11 | Stop reason: HOSPADM

## 2017-12-07 RX ORDER — LIDOCAINE 40 MG/G
CREAM TOPICAL
Status: DISCONTINUED | OUTPATIENT
Start: 2017-12-07 | End: 2017-12-11 | Stop reason: HOSPADM

## 2017-12-07 RX ORDER — ACETAMINOPHEN 325 MG/1
650 TABLET ORAL EVERY 4 HOURS PRN
Status: DISCONTINUED | OUTPATIENT
Start: 2017-12-07 | End: 2017-12-11 | Stop reason: HOSPADM

## 2017-12-07 RX ORDER — METHYLPREDNISOLONE SODIUM SUCCINATE 125 MG/2ML
125 INJECTION, POWDER, LYOPHILIZED, FOR SOLUTION INTRAMUSCULAR; INTRAVENOUS
Status: DISCONTINUED | OUTPATIENT
Start: 2017-12-07 | End: 2017-12-11 | Stop reason: HOSPADM

## 2017-12-07 RX ORDER — OXYCODONE HYDROCHLORIDE 30 MG/1
15-30 TABLET ORAL EVERY 4 HOURS PRN
Status: DISCONTINUED | OUTPATIENT
Start: 2017-12-07 | End: 2017-12-11 | Stop reason: HOSPADM

## 2017-12-07 RX ORDER — MONTELUKAST SODIUM 10 MG/1
20 TABLET ORAL 2 TIMES DAILY
Status: DISCONTINUED | OUTPATIENT
Start: 2017-12-07 | End: 2017-12-11 | Stop reason: HOSPADM

## 2017-12-07 RX ORDER — CYCLOBENZAPRINE HCL 10 MG
10 TABLET ORAL 3 TIMES DAILY
Status: DISCONTINUED | OUTPATIENT
Start: 2017-12-07 | End: 2017-12-11 | Stop reason: HOSPADM

## 2017-12-07 RX ORDER — FERROUS SULFATE 325(65) MG
325 TABLET ORAL
Status: DISCONTINUED | OUTPATIENT
Start: 2017-12-07 | End: 2017-12-11 | Stop reason: HOSPADM

## 2017-12-07 RX ORDER — DIAZEPAM 5 MG
5 TABLET ORAL 3 TIMES DAILY
Status: DISCONTINUED | OUTPATIENT
Start: 2017-12-07 | End: 2017-12-11 | Stop reason: HOSPADM

## 2017-12-07 RX ORDER — CELECOXIB 200 MG/1
200 CAPSULE ORAL 2 TIMES DAILY
Status: DISCONTINUED | OUTPATIENT
Start: 2017-12-07 | End: 2017-12-11 | Stop reason: HOSPADM

## 2017-12-07 RX ORDER — SODIUM CHLORIDE 9 MG/ML
1000 INJECTION, SOLUTION INTRAVENOUS CONTINUOUS PRN
Status: DISCONTINUED | OUTPATIENT
Start: 2017-12-07 | End: 2017-12-11 | Stop reason: HOSPADM

## 2017-12-07 RX ORDER — POTASSIUM CHLORIDE 750 MG/1
20 TABLET, EXTENDED RELEASE ORAL DAILY
Status: DISCONTINUED | OUTPATIENT
Start: 2017-12-07 | End: 2017-12-11 | Stop reason: HOSPADM

## 2017-12-07 RX ORDER — LORAZEPAM 0.5 MG/1
.5-1 TABLET ORAL EVERY 6 HOURS PRN
Status: DISCONTINUED | OUTPATIENT
Start: 2017-12-07 | End: 2017-12-11 | Stop reason: HOSPADM

## 2017-12-07 RX ORDER — CETIRIZINE HYDROCHLORIDE 10 MG/1
10 TABLET ORAL 3 TIMES DAILY
Status: DISCONTINUED | OUTPATIENT
Start: 2017-12-07 | End: 2017-12-11 | Stop reason: HOSPADM

## 2017-12-07 RX ORDER — METHOCARBAMOL 750 MG/1
750 TABLET, FILM COATED ORAL 4 TIMES DAILY
Status: DISCONTINUED | OUTPATIENT
Start: 2017-12-07 | End: 2017-12-11 | Stop reason: HOSPADM

## 2017-12-07 RX ORDER — POTASSIUM CHLORIDE 1.5 G/1.58G
20-40 POWDER, FOR SOLUTION ORAL
Status: DISCONTINUED | OUTPATIENT
Start: 2017-12-07 | End: 2017-12-11 | Stop reason: HOSPADM

## 2017-12-07 RX ORDER — DIPHENHYDRAMINE HCL 50 MG
50 CAPSULE ORAL
Status: DISCONTINUED | OUTPATIENT
Start: 2017-12-07 | End: 2017-12-11 | Stop reason: HOSPADM

## 2017-12-07 RX ORDER — SUMATRIPTAN 50 MG/1
50 TABLET, FILM COATED ORAL
Status: DISCONTINUED | OUTPATIENT
Start: 2017-12-07 | End: 2017-12-11 | Stop reason: HOSPADM

## 2017-12-07 RX ORDER — DEXAMETHASONE 4 MG/1
8 TABLET ORAL DAILY
Status: DISCONTINUED | OUTPATIENT
Start: 2017-12-12 | End: 2017-12-11 | Stop reason: HOSPADM

## 2017-12-07 RX ORDER — AMOXICILLIN 250 MG
1-2 CAPSULE ORAL EVERY EVENING
Status: DISCONTINUED | OUTPATIENT
Start: 2017-12-07 | End: 2017-12-11 | Stop reason: HOSPADM

## 2017-12-07 RX ORDER — ACETAMINOPHEN 325 MG/1
650 TABLET ORAL ONCE
Status: COMPLETED | OUTPATIENT
Start: 2017-12-07 | End: 2017-12-07

## 2017-12-07 RX ORDER — TRIAMCINOLONE ACETONIDE 0.25 MG/G
OINTMENT TOPICAL 2 TIMES DAILY PRN
Status: DISCONTINUED | OUTPATIENT
Start: 2017-12-07 | End: 2017-12-11 | Stop reason: HOSPADM

## 2017-12-07 RX ORDER — LORAZEPAM 2 MG/ML
.5-1 INJECTION INTRAMUSCULAR EVERY 6 HOURS PRN
Status: DISCONTINUED | OUTPATIENT
Start: 2017-12-07 | End: 2017-12-11 | Stop reason: HOSPADM

## 2017-12-07 RX ORDER — PROCHLORPERAZINE MALEATE 10 MG
10 TABLET ORAL EVERY 6 HOURS PRN
Status: DISCONTINUED | OUTPATIENT
Start: 2017-12-07 | End: 2017-12-11 | Stop reason: HOSPADM

## 2017-12-07 RX ORDER — ALBUTEROL SULFATE 0.83 MG/ML
2.5 SOLUTION RESPIRATORY (INHALATION)
Status: DISCONTINUED | OUTPATIENT
Start: 2017-12-07 | End: 2017-12-11 | Stop reason: HOSPADM

## 2017-12-07 RX ORDER — ALBUTEROL SULFATE 90 UG/1
1-2 AEROSOL, METERED RESPIRATORY (INHALATION)
Status: DISCONTINUED | OUTPATIENT
Start: 2017-12-07 | End: 2017-12-11 | Stop reason: HOSPADM

## 2017-12-07 RX ORDER — HEPARIN SODIUM (PORCINE) LOCK FLUSH IV SOLN 100 UNIT/ML 100 UNIT/ML
5 SOLUTION INTRAVENOUS
Status: DISCONTINUED | OUTPATIENT
Start: 2017-12-07 | End: 2017-12-11 | Stop reason: HOSPADM

## 2017-12-07 RX ORDER — LEVOTHYROXINE SODIUM 112 UG/1
336 TABLET ORAL WEEKLY
Status: DISCONTINUED | OUTPATIENT
Start: 2017-12-10 | End: 2017-12-11 | Stop reason: HOSPADM

## 2017-12-07 RX ORDER — CALCITRIOL 0.25 UG/1
0.25 CAPSULE, LIQUID FILLED ORAL EVERY MORNING
Status: DISCONTINUED | OUTPATIENT
Start: 2017-12-08 | End: 2017-12-11 | Stop reason: HOSPADM

## 2017-12-07 RX ORDER — NALOXONE HYDROCHLORIDE 0.4 MG/ML
.1-.4 INJECTION, SOLUTION INTRAMUSCULAR; INTRAVENOUS; SUBCUTANEOUS
Status: DISCONTINUED | OUTPATIENT
Start: 2017-12-07 | End: 2017-12-11 | Stop reason: HOSPADM

## 2017-12-07 RX ORDER — HYDROCHLOROTHIAZIDE 12.5 MG/1
12.5 CAPSULE ORAL DAILY
Status: DISCONTINUED | OUTPATIENT
Start: 2017-12-08 | End: 2017-12-11 | Stop reason: HOSPADM

## 2017-12-07 RX ORDER — MORPHINE SULFATE 60 MG/1
60 TABLET, FILM COATED, EXTENDED RELEASE ORAL DAILY
Status: DISCONTINUED | OUTPATIENT
Start: 2017-12-07 | End: 2017-12-07

## 2017-12-07 RX ORDER — DIPHENHYDRAMINE HCL 50 MG
50 CAPSULE ORAL ONCE
Status: COMPLETED | OUTPATIENT
Start: 2017-12-07 | End: 2017-12-07

## 2017-12-07 RX ORDER — BISACODYL 5 MG
15 TABLET, DELAYED RELEASE (ENTERIC COATED) ORAL DAILY PRN
Status: DISCONTINUED | OUTPATIENT
Start: 2017-12-07 | End: 2017-12-11 | Stop reason: HOSPADM

## 2017-12-07 RX ORDER — ACYCLOVIR 400 MG/1
400 TABLET ORAL 2 TIMES DAILY
Status: DISCONTINUED | OUTPATIENT
Start: 2017-12-07 | End: 2017-12-11 | Stop reason: HOSPADM

## 2017-12-07 RX ORDER — ALLOPURINOL 300 MG/1
300 TABLET ORAL DAILY
Status: DISCONTINUED | OUTPATIENT
Start: 2017-12-07 | End: 2017-12-07

## 2017-12-07 RX ORDER — HEPARIN SODIUM (PORCINE) LOCK FLUSH IV SOLN 100 UNIT/ML 100 UNIT/ML
5 SOLUTION INTRAVENOUS ONCE
Status: COMPLETED | OUTPATIENT
Start: 2017-12-07 | End: 2017-12-07

## 2017-12-07 RX ORDER — HEPARIN SODIUM,PORCINE 10 UNIT/ML
5-10 VIAL (ML) INTRAVENOUS
Status: DISCONTINUED | OUTPATIENT
Start: 2017-12-07 | End: 2017-12-11 | Stop reason: HOSPADM

## 2017-12-07 RX ORDER — LIDOCAINE 50 MG/G
OINTMENT TOPICAL 2 TIMES DAILY PRN
Status: DISCONTINUED | OUTPATIENT
Start: 2017-12-07 | End: 2017-12-11 | Stop reason: HOSPADM

## 2017-12-07 RX ORDER — FUROSEMIDE 20 MG
20 TABLET ORAL 2 TIMES DAILY
Status: DISCONTINUED | OUTPATIENT
Start: 2017-12-07 | End: 2017-12-11 | Stop reason: HOSPADM

## 2017-12-07 RX ORDER — CALCIUM CARBONATE 500 MG/1
1000 TABLET, CHEWABLE ORAL
Status: DISCONTINUED | OUTPATIENT
Start: 2017-12-07 | End: 2017-12-11 | Stop reason: HOSPADM

## 2017-12-07 RX ORDER — ONDANSETRON 8 MG/1
16 TABLET, FILM COATED ORAL EVERY 24 HOURS
Status: COMPLETED | OUTPATIENT
Start: 2017-12-07 | End: 2017-12-11

## 2017-12-07 RX ORDER — EPINEPHRINE 1 MG/ML
0.3 INJECTION, SOLUTION, CONCENTRATE INTRAVENOUS EVERY 5 MIN PRN
Status: DISCONTINUED | OUTPATIENT
Start: 2017-12-07 | End: 2017-12-11 | Stop reason: HOSPADM

## 2017-12-07 RX ADMIN — CROMOLYN SODIUM 1 DROP: 40 SOLUTION/ DROPS OPHTHALMIC at 16:07

## 2017-12-07 RX ADMIN — METHOCARBAMOL 750 MG: 750 TABLET ORAL at 08:00

## 2017-12-07 RX ADMIN — DIAZEPAM 5 MG: 5 TABLET ORAL at 13:08

## 2017-12-07 RX ADMIN — VINCRISTINE SULFATE 0.8 MG: 1 INJECTION, SOLUTION INTRAVENOUS at 17:07

## 2017-12-07 RX ADMIN — PROCHLORPERAZINE MALEATE 10 MG: 10 TABLET, FILM COATED ORAL at 13:22

## 2017-12-07 RX ADMIN — ACYCLOVIR 400 MG: 400 TABLET ORAL at 19:57

## 2017-12-07 RX ADMIN — CETIRIZINE HYDROCHLORIDE 10 MG: 10 TABLET, FILM COATED ORAL at 13:08

## 2017-12-07 RX ADMIN — METHOCARBAMOL 750 MG: 750 TABLET ORAL at 17:34

## 2017-12-07 RX ADMIN — LIDOCAINE: 50 OINTMENT TOPICAL at 20:59

## 2017-12-07 RX ADMIN — DIPHENHYDRAMINE HYDROCHLORIDE 50 MG: 50 CAPSULE ORAL at 13:08

## 2017-12-07 RX ADMIN — Medication 2 TABLET: at 13:23

## 2017-12-07 RX ADMIN — OXYCODONE HYDROCHLORIDE 30 MG: 30 TABLET ORAL at 13:08

## 2017-12-07 RX ADMIN — CELECOXIB 200 MG: 200 CAPSULE ORAL at 19:57

## 2017-12-07 RX ADMIN — RITUXIMAB 750 MG: 10 INJECTION, SOLUTION INTRAVENOUS at 14:28

## 2017-12-07 RX ADMIN — ENOXAPARIN SODIUM 40 MG: 40 INJECTION SUBCUTANEOUS at 12:15

## 2017-12-07 RX ADMIN — METHOCARBAMOL 750 MG: 750 TABLET ORAL at 20:59

## 2017-12-07 RX ADMIN — SENNOSIDES AND DOCUSATE SODIUM 1 TABLET: 8.6; 5 TABLET ORAL at 19:58

## 2017-12-07 RX ADMIN — CETIRIZINE HYDROCHLORIDE 10 MG: 10 TABLET, FILM COATED ORAL at 19:58

## 2017-12-07 RX ADMIN — SODIUM CHLORIDE, PRESERVATIVE FREE 5 ML: 5 INJECTION INTRAVENOUS at 08:52

## 2017-12-07 RX ADMIN — DIAZEPAM 5 MG: 5 TABLET ORAL at 19:57

## 2017-12-07 RX ADMIN — RANITIDINE 75 MG: 75 TABLET, FILM COATED ORAL at 13:07

## 2017-12-07 RX ADMIN — CROMOLYN SODIUM 1 DROP: 40 SOLUTION/ DROPS OPHTHALMIC at 19:55

## 2017-12-07 RX ADMIN — MONTELUKAST SODIUM 20 MG: 10 TABLET, FILM COATED ORAL at 19:57

## 2017-12-07 RX ADMIN — CYCLOBENZAPRINE HYDROCHLORIDE 10 MG: 10 TABLET, FILM COATED ORAL at 13:09

## 2017-12-07 RX ADMIN — FERROUS SULFATE TAB 325 MG (65 MG ELEMENTAL FE) 325 MG: 325 (65 FE) TAB at 13:08

## 2017-12-07 RX ADMIN — OXYCODONE HYDROCHLORIDE 30 MG: 30 TABLET ORAL at 23:59

## 2017-12-07 RX ADMIN — MORPHINE SULFATE 60 MG: 30 TABLET, EXTENDED RELEASE ORAL at 19:57

## 2017-12-07 RX ADMIN — VITAMIN D, TAB 1000IU (100/BT) 5000 UNITS: 25 TAB at 13:07

## 2017-12-07 RX ADMIN — CYCLOBENZAPRINE HYDROCHLORIDE 10 MG: 10 TABLET, FILM COATED ORAL at 19:57

## 2017-12-07 RX ADMIN — OXYCODONE HYDROCHLORIDE 30 MG: 30 TABLET ORAL at 16:57

## 2017-12-07 RX ADMIN — FERROUS SULFATE TAB 325 MG (65 MG ELEMENTAL FE) 325 MG: 325 (65 FE) TAB at 17:33

## 2017-12-07 RX ADMIN — PREDNISONE: 10 TABLET ORAL at 16:05

## 2017-12-07 RX ADMIN — DICLOFENAC 4 G: 10 GEL TOPICAL at 19:56

## 2017-12-07 RX ADMIN — FUROSEMIDE 20 MG: 20 TABLET ORAL at 19:57

## 2017-12-07 RX ADMIN — METHOCARBAMOL 750 MG: 750 TABLET ORAL at 13:01

## 2017-12-07 RX ADMIN — DICLOFENAC 4 G: 10 GEL TOPICAL at 16:06

## 2017-12-07 RX ADMIN — CALCITRIOL 0.5 MCG: 0.5 CAPSULE, LIQUID FILLED ORAL at 16:57

## 2017-12-07 RX ADMIN — POTASSIUM CHLORIDE 20 MEQ: 750 TABLET, EXTENDED RELEASE ORAL at 13:02

## 2017-12-07 RX ADMIN — ACETAMINOPHEN 650 MG: 325 TABLET, FILM COATED ORAL at 13:08

## 2017-12-07 RX ADMIN — ETOPOSIDE 120 MG: 20 INJECTION, SOLUTION, CONCENTRATE INTRAVENOUS at 17:01

## 2017-12-07 RX ADMIN — Medication 6 ML: at 08:30

## 2017-12-07 RX ADMIN — MORPHINE SULFATE 30 MG: 30 TABLET, EXTENDED RELEASE ORAL at 13:07

## 2017-12-07 RX ADMIN — ONDANSETRON HYDROCHLORIDE 16 MG: 8 TABLET, FILM COATED ORAL at 16:05

## 2017-12-07 RX ADMIN — RANITIDINE 75 MG: 75 TABLET, FILM COATED ORAL at 19:57

## 2017-12-07 ASSESSMENT — PAIN DESCRIPTION - DESCRIPTORS
DESCRIPTORS: ACHING

## 2017-12-07 ASSESSMENT — PAIN SCALES - GENERAL: PAINLEVEL: EXTREME PAIN (9)

## 2017-12-07 NOTE — LETTER
"Transition Communication Hand-off for Care Transitions to Next Level of Care Provider    Name: Tisha Arias  MRN #: [id number]  Primary Care Provider: Shahla Crawley  Primary Clinic: [address] [facility] [address]  [facility] [address]    Hematologist: Dr. Sauceda/[facility]     Reason for Hospitalization:  Lymphoma  DLBCL (diffuse large B cell lymphoma) (H)  Admit Date/Time: 12/7/2017 10:25 AM  Discharge Date: 12/11/2017  Payor Source: Payor: BCBS / Plan: [place]BS [place] / Product Type: Indemnity /       Tisha Arias is a 57 year old female with high grade DLBCL and PMHx significant for breast cancer s/p bilateral mastectomies & BENJIE/BSO, papillary thyroid cancer s/p total thyroidectomy, chronic chest wall pain, muscle spasms, asthma and h/o Rachael en Y gastric bypass, who is admitted for Cycle 4 of DA-R-EPOCH. \"Double hit-like\". Primary is Dr. Sauceda. Patient diagnosed August 2017. Lytic lesion of right 10th rib with extension. Disease localized above the diaphragm in thoracic and paravertebral soft tissue and mediastinal lymphadenopathy. 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 Staging LP and BMBx were negative for lymphoma. Initiated cycle 1 DA-R-EPOCH on 10/6/17. Has completed through 3 cycles. She now presents for cycle 4 DA-R-EPOCH with 20% dose increase.    Discharge Plan:  Home with clinic follow up     Follow-up plan:  Future Appointments  Date Time Provider Department Center   12/13/2017 7:30 AM  [facility]   12/13/2017 8:00 AM Evelia Johns PA-C [facility]   12/15/2017 9:00 AM  [facility]   12/19/2017 7:30 AM  [facility]   12/22/2017 9:00 AM [address] [place] [address]   12/26/2017 1:30 PM  [facility]   12/26/2017 2:00 PM Garima Sauceda MD [facility] "   5/21/2018 4:20 PM Avelina Castro MD [facility]   6/11/2018 4:00 PM Shahla Crawley MD [facility]     Tejal Schrader    AVS/Discharge Summary is the source of truth; this is a helpful guide for improved communication of patient story

## 2017-12-07 NOTE — PROGRESS NOTES
DATE/TIME  (DOT-TD, DOT-NOW) CHEMO CHECK ACTIVITY (REGIMEN & DOSE CHECK, DAY, DOSE #, NAME OF CHEMO #1)  CHEMO DRUG #2  CHEMO DRUG #3 NAME OF RN #1 (USE DOT-ME HERE) NAME OF RN#2 (2ND RN TO LOG IN SEPARATELY)   12/7/2017  10:44 AM   Regimen dose double check.    Tamiko Carroll     12/7/2017  2:19 PM     Rituxan dose double check   Tamiko Garcia      12/7/2017  3:57 PM   Etoposide dose #1 Vinc/dox dose #1  Sonia Rodriguez   12/8/2017   12:03 PM  IT chemo MTX    Tara Farias     12/8/2017  3:10 PM   Day 2 vincristine/Dox double check Day 2 etop double check  Dereck Espana   (Kris HARDEN)   12/9/2017  2:30 PM   Day 3 vinc/dox Day 3 etoposide  Jessica Archibald     12/11/2017  12:30 PM   Day 5 Cytoxan   Viraphet Xaphakdy Chanthavixay   Kris Whelan

## 2017-12-07 NOTE — H&P
"[facility], [place]    History and Physical  Hematology / Oncology     Date of Admission:  11/16/2017  Date of Service (when I saw the patient): 11/16/17    Assessment & Plan   Tisha Arias is a 57 year old female with high grade DLBCL and PMHx significant for breast cancer s/p bilateral mastectomies & BENJIE/BSO, papillary thyroid cancer s/p total thyroidectomy, chronic chest wall pain, muscle spasms, asthma and h/o Rachael en Y gastric bypass, who presents for Cycle 4 of DA-R-EPOCH.     HEME/ONC  # High grade B cell lymphoma. \"Double hit-like\". Primary is Dr. Sauceda. Patient diagnosed August 2017. Lytic lesion of right 10th rib with extension. Disease localized above the diaphragm in thoracic and paravertebral soft tissue and mediastinal lymphadenopathy. 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 Staging LP and BMBx were negative for lymphoma. Initiated cycle 1 DA-R-EPOCH on 10/6/17. S/p cycle 2 10/27/17. She now presents for cycle 4 DA-R-EPOCH.  -Port-a-cath in place  -Labs ok to begin therapy     Treatment plan: Cycle 4 DA-R-EPOCH (with 20% dose increase) (Day 1=12/7/17).  Today is Day 1.  -Rituxan 750 mg (Day 0)  -Prednisone 60 mg  (Day 1-5)  -Etoposide 120 mg CIVI (Day 1-4)  -Vincristine 0.8 mg/Doxorubicin 24mg CIVI (Day 1-4)  -Cyclophosphamide 1810 mg (Day 5)  -LP with IT chemotherapy scheduled 12/8 @1300 with Xray guided, orders entered.  -Premeds: Tylenol and benadryl prior to Rituxan, Zofran (D1-5), Emend (d5), Dex (D6-7).  -Neulasta after discharge.  -Discharge with D6-7 Dexamethasone     #Anemia. 2/2 disease and chemotherapy.  - While inpatient, transfuse to keep Hgb >7, Plts >10K.   - At hospital discharge, continue to monitor with twice weekly labs and transfuse if Hgb<8 and Plt<10k.      #Stage 1, ER/AR-positive, HER2-negative breast " cancer. Follows with Dr. Crawley. Diagnosed in 2011. Oncotype recurrence score of 11 (7% recurrence risk after 5 years of tamoxifen). S/p bilateral mastectomies and BENJIE/BSO in 2012. - Was initially on Arimidex (2085-0317) but changed to Tamoxifen due to arthralgias. Tamoxifen held since 10/5 and continue to hold with chemotherapy. Last f/u Dr. Crawley was on 11/27/17.     # Papillary thyroid cancer. Follows with Dr. Castro. Diagnosed in 2013. S/p total thyroidectomy and CND. Received ETOH treatment August 2014, October 2014 and December 2014. Has post-surgical hypothyroidism.  -Continue PTA Levothyroxine.  -Continue PTA calcitriol.     GI  # H/o Rachael en Y gastric bypass. Likely affecting absorption, thus patient is on multiple supplements.  - Continue supplements (calcium citrate-vitamin D, vitamin D3, magnesium citrate). Also has iron deficiency anemia likely from poor absorption and per Dr. Sauceda, she should continue 325mg PO ferrous sulfate TID.      Pain  # Chronic chest wall pain after mastectomy.   -Continue MS Contin 30/30/60 mg TID  -Oxycodone 15 mg q4hrs prn.  -Celebrex 200 BID.   -Lidocaine cream prn.     #Chronic muscle cramps.   -Continue KCl 20 mEq daily, Robaxin 750 mg QID, Valium 5 mg TID prn, Flexeril prn.     ID  #Anti-infectives.  -Continue PTA acyclovir.  -Will hold Fluconazole/Levaquin ppx as patient is not neutropenic. To resume on discharge or when ANC <1000.     MISC  #Lymphedema of lower extremities  - Worsening BLE edema; ECHO done today in clinic with EF 55-60%. BLE doppler US without DVTs.  Chronic issue, will schedule lasix today.   -Continue PTA HCTZ and Lasix.  -Monitor BMP daily     #Asthma, allergies.   -Continue PTA singular, zyrtec.  -Patient states she is not taking her inhalers, not entered      FEN  -IVF per chemo regimen  -PTA electrolyte replacements and lyte protocol  -RDAT     PPx  -VTE: Lovenox ppx (held today and tomorrow for LP tomorrow).   -PUD: PTA  ranitidine  -Bowels: PTA docusate and miralax      Dispo: Pending completion of chemotherapy and resolution of any acute toxicities, likely 4-5 day stay (12/11).     Above plan discussed with staff physician, Dr. Alanis.     Shanna Cheney, [facility]  Hematology/Oncology  #[id number]      Code Status   Full Code    Primary Care Physician   Shahla Crawley    Chief Complaint   Admission for cycle 4 DA REPOCH    History is obtained from the patient    History of Present Illness   Tisha Arias is a 57 year old female with a history of recently diagnosed DLBCL, breast cancer on Tamoxifen s/p  bilateral mastectomies and BENJIE/BSO, h/o papillary thyroid cancer s/p total thyroidectomy, chronic chest wall pain and asthma who presents for cycle 1 DA-REPOCH. She was diagnosed with early-stage breast cancer in 2011 and underwent bilateral mastectomy with reconstruction and has been on Tamoxifen. She did not have chemotherapy or radiation. She has suffered chronically from a pain syndrome involving the chest wall and back and [facility] with [facility]. Benitez. In August 2017, she presented to ER with dramatic worsening chest pain and back pain. Her workup was negative for cardiac and pulmonary issues. However, the CT scan performed on 09/01/2017 demonstrated a new destructive lesion in the medial right tenth rib extending to the right T9-T10 neural foramen with vertebral body invasion. A CT guided biopsy on 09/15 showed B-cell high-grade lymphoma. The morphology shows a population of large cells, diffuse lymphoid infiltrate. The cells are atypical with abundant mitotic and apoptotic activity and single cell necrosis. Tumor is CD45 and CD79a positive and focally weakly CD30 positive. The other stains revealed positivity for CD20, BCL6, BCL2 and MUM1, and CD10 and CD21 negative. The CD5 and CD3 highlighted background T-cells. MYC expression was equivocal with approximately 40% nuclei staining. Ki-67  proliferative index is greater than 90-95%. The immunohistochemistry is suggestive of non-GCB immunophenotype of diffuse large B-cell lymphoma. The cytogenetics did not show rearrangement of the BCL2 or c-MYC. There is the presence of BCL6 rearrangement in 78% of cells and gains of MYC and BCL2, but no amplifications. A staging LP and BMBx were negative for lymphoma. She initiated therapy with DA-REPOCH on 10/6/17. She now presents for cycle 4 DA-REPOCH.      On admission, pt states she has been doing well. She reports feeling well overall. She notes mild peeling of her fingertips. She reports her leg swelling is worse and she could not get compression stockings on yesterday.  She denies any nausea, vomiting. Last BM yesterday, normal. Her chronic pain persists, managed by current regimen.  Has SOB with exertion. No recent fevers. Denies chest pain, abdominal pain, dysuria.     Past Medical History    I have reviewed this patient's medical history and updated it with pertinent information if needed.   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 No real need for Epipen for this alone.     GDM (gestational diabetes mellitus)     w first pregnancy only     Gestational hypertension      Lymphedema     jackson arms, chest     Migraine      Neuropathy associated with cancer (H)      Papillary carcinoma, follicular variant (H) 6/28/2013    pT3, N1, Mx, thyroid     Post-surgical hypothyroidism      Renal disease     kidney stones     Rhinitis, allergic to other allergen      Right Breast mass and microcalcifications 12/13/2011     Seasonal allergic conjunctivitis      Seasonal allergic rhinitis 4/12/11 skin tests  pos. for: dog/M/T/G/W--NEGATIVE FOOD TESTS FOR: shrimp, crab, lobster, coconut       Past Surgical History   I have reviewed this patient's surgical history and updated it with pertinent information if needed.  Past Surgical History:   Procedure Laterality Date     BACK SURGERY  ,    Bulging disc w nerve root impigment     BIOPSY BREAST      right     BIOPSY BREAST  11    right, core and sterotactic     BONE MARROW BIOPSY, BONE SPECIMEN, NEEDLE/TROCAR Left 10/3/2017    Procedure: BIOPSY BONE MARROW;  Bone Marrow Biopsy with aspirate;  Surgeon: Amelia Watkins PA-C;  [facility]     BYPASS GASTRIC, CHOLECYSTECTOMY, COMBINED       C LAPAROSCOPIC GASTRIC RESTRICTIVE PX, W/GASTRIC BYPASS/ GAMALIEL-EN-Y, < 150CM      Gamaliel en Y?      SECTION       COLONOSCOPY      normal     COSMETIC SURGERY  2012    Final Stage of Mastectomy     DAVINCI HYSTERECTOMY TOTAL, BILATERAL SALPINGO-OOPHORECTOMY, COMBINED  2012    Procedure: COMBINED DAVINCI HYSTERECTOMY TOTAL, SALPINGO-OOPHORECTOMY;  Davinci Total Laparoscopic Hysterectomy, Bilateral Salpingo Oophorectomy, Pelvic Washings, Cystoscopy;  Surgeon: vEie Sheikh MD;  Location: [facility] OR     ENT SURGERY       ESOPHAGOSCOPY, GASTROSCOPY, DUODENOSCOPY (EGD), COMBINED N/A 2017    Procedure: COMBINED ENDOSCOPIC ULTRASOUND, ESOPHAGOSCOPY, GASTROSCOPY, DUODENOSCOPY (EGD), FINE NEEDLE ASPIRATE/BIOPSY;  Esophagogastroduodenoscopy, Endoscopic Ultrasound, with fine needle biopsy aspirate;  Surgeon: Patrick Robles MD;  Location: [facility] OR     GI SURGERY  2007    Gamaliel-en Y w cholecystectomy     GYN SURGERY  89,1/10/92    2 c-sections     HYSTERECTOMY, PAP NO LONGER INDICATED      DeVinci assister lap hyst with BSO     INSERT PORT VASCULAR ACCESS Right 10/4/2017    Procedure: INSERT PORT VASCULAR ACCESS;  Port Placement;  Surgeon: Jc Goodman PA-C;  [facility]: [facility]     IRRIGATION AND DEBRIDEMENT BREAST   3/1/2012    Procedure:IRRIGATION AND DEBRIDEMENT BREAST; Irrigation and Debridement, Wound Closure Right Breast; Surgeon:JUNG GUILLEN; Location:[facility] OR     MASTECTOMY, RECONSTRUCT BREAST, COMBINED  1/30/2012    Procedure:COMBINED MASTECTOMY, RECONSTRUCT BREAST; Bilateral Mastectomies, Right Axillary Randlett Node Biopsy, Bilateral Breast Reconstruction with Tissue Expanders, Reconstruction of inframammary fold, bilateral pain management systems; Surgeon:ANUPAM CAMPBELL; Location:[facility] OR     RECONSTRUCT BREAST  8/31/2012    Procedure: RECONSTRUCT BREAST;  Bilateral 2nd stage breast reconstruction, revision,       SOFT TISSUE SURGERY  1-30-12    Mastectomy w severe myofascial pain syndrome     THYROIDECTOMY  7/10/2013    Procedure: THYROIDECTOMY;  Total Thyroidectomy with central neck dissection;  Surgeon: Indiana Sneed MD;  Location: [facility] OR     TONSILLECTOMY      childhood       Prior to Admission Medications   Cannot display prior to admission medications because the patient has not been admitted in this contact.     Allergies   Allergies   Allergen Reactions     Baclofen Other (See Comments) and Unknown     Other reaction(s): Edema, chest pain, seizures.      No Clinical Screening - See Comments Shortness Of Breath, Palpitations, Anaphylaxis, Itching, Swelling, Difficulty breathing and Rash     Sukhjinder wipes- oral allergy -  July 2015: throat tightness from a Chinese herbal medicine Wilmer Barry Tran     Serotonin Anxiety, Other (See Comments) and Swelling     Seizures      Amitriptyline Hcl Swelling     Birch Trees      Potatoes, carrots, cherries, celery, apple, pears, plums, peaches, parsnip, kiwi, hazelnuts, and apricots,      Blue Dyes Itching     Headaches       Cymbalta Other (See Comments)     Flushing, tremor/muscle twitching and edema     Gabapentin Other (See Comments)     edema  Systemic edema, weaned off from Feb to March per Dr. Dowd.    edema     Grass      Mugwort [Artemisia Vulgaris]       Various spices     Ragweeds      Melons, bananas, cucumbers, zucchini.     Topamax      Nortriptyline Itching, Visual Disturbance, Swelling, GI Disturbance, Anxiety, Other (See Comments) and Nausea     Other reaction(s): Swelling       Social History   I have reviewed this patient's social history and updated it with pertinent information if needed. Tisha Arias  reports that she has never smoked. She has never used smokeless tobacco. She reports that she does not drink alcohol or use illicit drugs.    Family History   I have reviewed this patient's family history and updated it with pertinent information if needed.   Family History   Problem Relation Age of Onset     Allergies Mother      Arthritis Mother      CANCER Mother      uterine/uterine     Hypertension Mother      on HCTZ     Hyperlipidemia Mother      CEREBROVASCULAR DISEASE Mother      s/p brain surgery     Breast Cancer Mother      Depression Mother      Anxiety Disorder Mother      Anesthesia Reaction Mother      Asthma Mother      Skin Cancer Mother      HEART DISEASE Father      DIABETES Father      Coronary Artery Disease Father      Hypertension Father      Hyperlipidemia Father      CEREBROVASCULAR DISEASE Father      Multiple strokes     Obesity Father      DIABETES Brother      Depression Brother      Hypertension Brother      CANCER Maternal Grandfather      pancreatic cancer/stomach cancer     Prostate Cancer Maternal Grandfather      Prostrate and Bladder     Other Cancer Maternal Grandfather      Pancreatic 1988, Bladder 1977     CANCER Maternal Grandmother      uterine     Breast Cancer Maternal Grandmother      Skin Cancer Maternal Grandmother      CANCER Brother 57     pancreatic cancer     DIABETES Brother      Breast Cancer Cousin      Grand mothers sister     Other Cancer Brother      Stage 3 Pancreatic 2-2015     Depression Brother      Asthma Brother      Obesity Brother      Anesthesia Reaction Other      H/a, itching, drug  interactions     Asthma Other      CANCER Brother      Pancreatic      Thyroid Disease No family hx of        Review of Systems   The 10 point Review of Systems is negative other than noted in the HPI or here.     Physical Exam                      Vital Signs with Ranges  Temp:  [97.7  F (36.5  C)] 97.7  F (36.5  C)  Pulse:  [130] 130  BP: (118)/(75) 118/75  SpO2:  [100 %] 100 %  0 lbs 0 oz    Constitutional: Pleasant female seen sitting in bed. No apparent distress, and appears stated age.  Eyes: Lids and lashes normal, sclera clear, conjunctiva normal.  ENT: Normocephalic, oral pharynx with moist mucus membranes, tonsils without erythema or exudates, gums normal and good dentition.   Respiratory: No increased work of breathing, good air exchange, clear to auscultation bilaterally, no crackles or wheezing.  Cardiovascular: Regular rate and rhythm, normal S1 and S2, and no murmur noted.  GI: No masses or scars. +BS. Soft. Tenderness on palpation to LUQ.  Skin: No bruising or bleeding, no redness, warmth, or swelling, no rashes, no lesions, no jaundice.  Extremities: There is no redness, warmth, or swelling of the joints. +1 lower extremity edema, compression stockings in place. No cyanosis.  Neurologic: Awake, alert, oriented to name, place and time.    Vascular access: Port, c/d/i    Data   ROUTINE IP LABS (Last four results)  BMP    Recent Labs  Lab 12/07/17  0908 12/05/17  0636 12/04/17  0642 12/01/17  0640    141 143 145*   POTASSIUM 3.4 3.8 4.1 4.4   CHLORIDE 102 103 107 109   ELMO 8.4* 8.0* 8.4* 8.1*   CO2 30 31 28 26   BUN 14 12 13 16   CR 0.90 0.79 0.88 0.83   * 111* 100* 114*     CBC    Recent Labs  Lab 12/07/17  0908 12/05/17  0636 12/04/17  0642 12/01/17  0640   WBC 7.9 6.9 9.0 8.0   RBC 3.28* 3.23* 3.06* 3.02*   HGB 9.7* 9.4* 8.8* 8.6*   HCT 31.2* 30.7* 29.2* 28.1*   MCV 95 95 95 93   MCH 29.6 29.1 28.8 28.5   MCHC 31.1* 30.6* 30.1* 30.6*   RDW 25.8* 25.4* 25.3* 25.2*    173 129* 89*      INRNo lab results found in last 7 days.

## 2017-12-07 NOTE — IP AVS SNAPSHOT
[address] [address]    Phone:  [phone]                                       After Visit Summary   12/7/2017    Tisha Arias    MRN: [id number]           After Visit Summary Signature Page     I have received my discharge instructions, and my questions have been answered. I have discussed any challenges I see with this plan with the nurse or doctor.    ..........................................................................................................................................  Patient/Patient Representative Signature      ..........................................................................................................................................  Patient Representative Print Name and Relationship to Patient    ..................................................               ................................................  Date                                            Time    ..........................................................................................................................................  Reviewed by Signature/Title    ...................................................              ..............................................  Date                                                            Time

## 2017-12-07 NOTE — PROGRESS NOTES
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 She is currently undergoing treatment with DA-R-EPOCH. Her past medical history is significant for breast cancer in 2011 s/p bilateral mastectomies and BENJIE/BSO up until recently on Tamoxifen, papillary thyroid cancer s/p total thyroidectomy, chronic chest wall pain, and asthma. Briefly, her oncologic history is as follows:    She presented in 8/2017 with dramatically worse chest and back pain. CT 9/1/17 showed lytic lesion right 10th rib extending to right T9-10 neural foramen with vertebral body invasion. There was associated conglomerate of lymphadenopathy around the mid T-spine. She had a CT guided biopsy showing B-cell high grade lymphoma. Pathology showed \"The morphology shows a population of large cells, diffuse lymphoid infiltrate. The cells are atypical with abundant mitotic and apoptotic activity and single cell necrosis. Tumor is CD45 and CD79a positive and focally weakly CD30 positive. The other stains revealed positivity for CD20, BCL6, BCL2 and MUM1, and CD10 and CD21 negative. The CD5 and CD3 highlighted background T-cells.  MYC expression was equivocal with approximately 40% nuclei staining.  Ki-67 proliferative index is greater than 90-95%. The immunohistochemistry is suggestive of non-GCB immunophenotype of diffuse large B-cell lymphoma. The cytogenetics did not show rearrangement of the BCL2 or c-MYC. There is the presence of BCL6 rearrangement in 78% of cells and gains of MYC and BCL2, but no amplifications.\"     Staging LP and BMBx were negative for lymphoma. She started DA-REPOCH 10/6/17. She did well with chemo other than rigors during CIVI. D/c 10/11/17. Given Neulasta 10/12/17. Post cycle 1 complicated by mucositis and worsening of chronic LE peripheral edema. She began cycle 2 on " 10/27/17. Due to a rise in her LD and uric acid levels on that admission day, she underwent a CT scan which showed response to therapy with improvement in her mediastinal lymphadenopathy and right chest wall mass. She comes in today for routine follow up prior to admission for cycle 4 DA-R-EPOCH.     Interval History:  Patient reports that with this last cycle she has noticed more peeling of the skin on her fingers and feet. She has been applying an Aveno lotion to her fingers with some relief. She did also notice some peeling skin on her trunk and arms. She reports that the numbness in her fingers and toes has gotten a little bit worse. She does sometimes stumble with her feet but denies any falls. She denies any difficulty doing buttons or zippers. She has mild pain with this. She has had some times of feeling lightheaded improved with drinking more fluids. She does have intermittent headaches from the back of her head to the front usually alleviated with Tylenol. She has taken Imitrex twice in the last month. Since her last cycle of chemotherapy, she has had 2 episodes of increased chest pain. Her pain is at its baseline right now. She continues on the MS Contin 30/30/60 mg tid and is currently taking 15 mg of oxycodone about 4 times a day. She does note that stairs are hard for her. She did have nausea earlier this week with dry heaves that improved with her antiemetics. Her constipation is managed with Maya-Colace and MiraLAX. She reports feeling tired and her work has allowed her to change her start time to be 9:30 which she will start after this admission. She reports that she is eating fair and does drink some Ensure. She reports drinking more than 64 ounces of fluid a day. She has noticed increased leg swelling for unclear reasons. She denies other concerns.    Review of Systems:  Patient denies any of the following except if noted above: fevers, chills, vision or hearing changes, dyspnea, abdominal pain,  current nausea, vomiting, diarrhea, urinary concerns, issues with sleep or mood.      Current Outpatient Prescriptions   Medication Sig Dispense Refill     allopurinol (ZYLOPRIM) 300 MG tablet Take 300 mg by mouth daily  0     triamcinolone (KENALOG) 0.025 % ointment Apply topically as needed  3     morphine (MS CONTIN) 30 MG 12 hr tablet Take 1 tablet (30 mg) by mouth every 8 hours . Take an addition pill at night such that your evening dose is 60mg 120 tablet 0     oxyCODONE IR (ROXICODONE) 30 MG tablet Take 1 tablet (30 mg) by mouth every 4 hours as needed for moderate to severe pain 180 tablet 0     lidocaine (XYLOCAINE) 5 % ointment Apply quarter size amount to chest and back up to 3 times daily as needed for pain. 350 g 1     montelukast (SINGULAIR) 10 MG tablet Take 1 tablet (10 mg) by mouth 2 times daily (Patient taking differently: Take 20 mg by mouth 2 times daily ) 60 tablet 0     ondansetron (ZOFRAN-ODT) 8 MG ODT tab Take 1 tablet (8 mg) by mouth every 8 hours as needed 30 tablet 1     fluconazole (DIFLUCAN) 200 MG tablet Take 1 tablet (200 mg) by mouth daily 30 tablet 0     acyclovir (ZOVIRAX) 400 MG tablet Take 1 tablet (400 mg) by mouth 2 times daily 60 tablet 0     furosemide (LASIX) 20 MG tablet Take 1 tablet (20 mg) by mouth 2 times daily 60 tablet 0     levofloxacin (LEVAQUIN) 250 MG tablet Take 1 tablet (250 mg) by mouth daily 30 tablet 0     magic mouthwash suspension (diphenhydrAMINE, lidocaine, aluminum-magnesium & simethicone) Swish and spit 10 mLs in mouth every 6 hours as needed for mouth sores 360 mL 1     diclofenac (VOLTAREN) 1 % GEL topical gel Apply affected area two times daily PRN using enclosed dosing card. 100 g 0     cetirizine (ZYRTEC ALLERGY) 10 MG tablet Take 1 tablet (10 mg) by mouth 3 times daily On hold for lab test. 30 tablet 0     ferrous sulfate (IRON) 325 (65 FE) MG tablet Take 1 tablet (325 mg) by mouth 3 times daily (with meals) 90 tablet 0     bisacodyl (DULCOLAX) 5  MG EC tablet Take 3 tablets (15 mg) by mouth daily as needed for constipation 30 tablet 0     polyethylene glycol (MIRALAX) powder Take 17 g (1 capful) by mouth daily (Patient taking differently: Take 1 capful by mouth daily as needed ) 510 g 0     cyclobenzaprine (FLEXERIL) 10 MG tablet Take 1 tablet (10 mg) by mouth 3 times daily as needed 90 tablet 0     senna-docusate (SENOKOT-S;PERICOLACE) 8.6-50 MG per tablet Take 1-2 tablets by mouth 2 times daily as needed for constipation 60 tablet 0     UNABLE TO FIND Take 2 capsules by mouth 3 times daily Muscle Mag. 2 caps contain B1 20mg, B2 20mg, B6 10mg, magesium 20mg, manganese 2mg.       Morphine Sulfate (MS CONTIN PO) Take 60 mg by mouth daily Takes at 8pm       lidocaine-prilocaine (EMLA) cream Apply topically as needed (port access pain.) 30 g 1     calcitRIOL (ROCALTROL) 0.25 MCG capsule TAKE 2 CAPSULES BY MOUTH EVERY MORNING AND TAKE 1 CAPSULE BY MOUTH EVERY NIGHT AT BEDTIME (Patient taking differently: TAKE 2 CAPSULES BY MOUTH EVERY MORNING AND TAKE 1 CAPSULE BY MOUTH at noon) 270 capsule 3     celecoxib (CELEBREX) 200 MG capsule TAKE ONE CAPSULE BY MOUTH TWICE DAILY  56 capsule 0     order for DME Compression stockings, medium grade, please measure and fit. Please dispense a pair. 1 Units 0     methocarbamol (ROBAXIN) 750 MG tablet Take 1 tablet (750 mg) by mouth 4 times daily as needed . Ok to take a 5th Robaxin on very severe days. (Patient taking differently: Take 750 mg by mouth 4 times daily . Ok to take a 5th Robaxin on very severe days.) 360 tablet 1     diazepam (VALIUM) 5 MG tablet Take 1 tablet (5 mg) by mouth 3 times daily as needed For muscle spasms (Patient taking differently: Take 5 mg by mouth 3 times daily For muscle spasms) 90 tablet 2     levothyroxine (SYNTHROID/LEVOTHROID) 112 MCG tablet Mon-Sat: (2 tabs daily) Sunday: 3 tabs (Patient taking differently: 112 mcg Mon-Sat: (2 tabs daily) Sunday: 3 tabs) 189 tablet 3     hydrochlorothiazide  (MICROZIDE) 12.5 MG capsule Take 1 capsule (12.5 mg) by mouth daily 90 capsule 2     potassium chloride SA (POTASSIUM CHLORIDE) 20 MEQ CR tablet Take 1 tablet (20 mEq) by mouth daily 90 tablet 3     cromolyn (OPTICROM) 4 % ophthalmic solution Place 1 drop into both eyes 4 times daily 10 mL 5     Cholecalciferol (VITAMIN D3) 2000 UNITS CAPS Take 5,000 Units by mouth daily Takes 2 tabs daily 60 capsule 4     order for DME Equipment being ordered: compression stockings. 20-30 mm or 30 - 40 mm as patient can tolerate. Physical therapy to determine if they should be above or below the knee. 2 Units 4     ranitidine (ZANTAC) 75 MG tablet Take 1 tablet (75 mg) by mouth 3 times daily 270 tablet 3     UNABLE TO FIND MEDICATION NAME: Tumeric 3 capsules daily (on hold during chemo)       UNABLE TO FIND 2 tablets 3 times daily MEDICATION NAME: Natural D-Hist (on hold during chemo)       UNABLE TO FIND 3 tablets 3 times daily MEDICATION NAME: calcium D-Glucarate   3 caps contain 180mg of elemental calcium.       UNABLE TO FIND 2 tablets 3 times daily MEDICATION NAME: Digestzymes        UNABLE TO FIND 1 tablet daily MEDICATION NAME: Pure Encapsulations       Probiotic Product (PROBIOTIC DAILY PO) Take 1 capsule by mouth daily Lacto acid bifidobacterium       dexamethasone (DECADRON) 4 MG tablet Take 2 tablets (8 mg) by mouth daily for 2 days On Wed 11/22 and Thursday 11/23 4 tablet 0     prochlorperazine (COMPAZINE) 10 MG tablet Take 1 tablet (10 mg) by mouth every 6 hours as needed for nausea or vomiting (Patient not taking: Reported on 12/7/2017) 60 tablet 3     docusate sodium (COLACE) 100 MG tablet Take 100 mg by mouth daily (Patient not taking: Reported on 12/7/2017) 30 tablet 0     SUMAtriptan (IMITREX) 50 MG tablet Take 1 tablet (50 mg) by mouth at onset of headache for migraine (may repeat in 2 hours as needed, max 2 tablets daily) (Patient not taking: Reported on 12/7/2017) 9 tablet 3     diphenhydrAMINE (BENADRYL) 50 MG  "capsule Take 1 capsule (50 mg) by mouth nightly as needed for itching or sleep (Patient not taking: Reported on 11/16/2017) 56 capsule      COMPOUND CONTAINING CONTROLLED SUBSTANCE (CMPD RX) - PHARMACY TO MIX COMPOUNDED MEDICATION Apply small amount to affected area two times daily. (Patient not taking: Reported on 11/27/2017) 120 g 6     EPINEPHrine (EPIPEN 2-CARIDAD) 0.3 MG/0.3ML injection Inject 0.3 mLs (0.3 mg) into the muscle once as needed for anaphylaxis (Patient not taking: Reported on 12/7/2017) 0.6 mL 3     VENTOLIN  (90 BASE) MCG/ACT Inhaler INHALE 2 PUFFS INTO THE LUNGS EVERY 4 HOURS AS NEEDED FOR SHORTNESS OF BREATH OR DIFFICULT BREATHING OR WHEEZING (Patient not taking: Reported on 12/7/2017) 18 g 3     NASALCROM 5.2 MG/ACT AERS Inhaler SPRAY ONE SPRAY( 1 ML) IN NOSTRIL DAILY (Patient not taking: Reported on 12/7/2017) 1 Bottle 6     order for DME Equipment being ordered: compression bra (Patient not taking: Reported on 12/7/2017) 2 Device 1     aluminum chloride (DRYSOL) 20 % external solution Apply topically At Bedtime (Patient not taking: Reported on 12/7/2017) 60 mL 3     mometasone (ASMANEX 30 METERED DOSES) 110 MCG/INH inhaler Inhale 1 puff into the lungs daily (Patient not taking: Reported on 12/7/2017) 3 Inhaler 3     calcium citrate-vitamin D (CALCIUM CITRATE +D) 315-250 MG-UNIT TABS Take 2 tablets by mouth twice a week  120 tablet      calcium carbonate (TUMS) 500 MG chewable tablet Take 2 tablets (1,000 mg) by mouth nightly as needed for other (cramps) 180 chew tab 3     Triamcinolone Acetonide (AZMACORT IN) Inhale 2 puffs into the lungs as needed       Physical Examination:  /75 (BP Location: Right arm, Patient Position: Sitting, Cuff Size: Adult Regular)  Pulse 130  Temp 97.7  F (36.5  C) (Oral)  Ht 1.605 m (5' 3.19\")  Wt 86.3 kg (190 lb 4.8 oz)  SpO2 100%  BMI 33.51 kg/m2  Wt Readings from Last 10 Encounters:   12/07/17 86.3 kg (190 lb 4.8 oz)   11/28/17 84.3 kg (185 lb " 12.8 oz)   11/28/17 84.3 kg (185 lb 12.8 oz)   11/27/17 83 kg (182 lb 14.4 oz)   11/22/17 83.8 kg (184 lb 11.2 oz)   11/20/17 85.2 kg (187 lb 12.8 oz)   11/16/17 85.1 kg (187 lb 11.2 oz)   11/13/17 85.3 kg (188 lb)   11/10/17 84.8 kg (187 lb)   11/06/17 83.3 kg (183 lb 9.6 oz)   Constitutional: Well-appearing female in no acute distress.  Eyes: EOMI, PERRL. No scleral icterus.  ENT: Oral mucosa is moist without lesions or thrush.   Lymphatic: Neck is supple without cervical or supraclavicular lymphadenopathy.   Cardiovascular: Mild tachycardia with regular rhythm.  Respiratory: Clear to auscultation bilaterally. No wheezes or crackles.  Gastrointestinal: Bowel sounds present. Abdomen soft, nontender.  No masses palpated.   Neurologic: Cranial nerves II through XII are intact.   Skin: Skin on fingertips and the bottoms of her feet is dry and mildly erythematous.    Extremities: 1+ lower extremity edema bilaterally.    Laboratory Data:   12/7/2017 09:08   Sodium 140   Potassium 3.4   Chloride 102   Carbon Dioxide 30   Urea Nitrogen 14   Creatinine 0.90   GFR Estimate 64   GFR Estimate If Black 78   Calcium 8.4 (L)   Anion Gap 9   Magnesium 2.0   Phosphorus 4.2   Albumin 3.0 (L)   Protein Total 6.2 (L)   Bilirubin Total 0.3   Alkaline Phosphatase 89   ALT 27   AST 26   Lactate Dehydrogenase 382 (H)   N-Terminal Pro Bnp 39   Uric Acid 6.3 (H)   Glucose 120 (H)   WBC 7.9   Hemoglobin 9.7 (L)   Hematocrit 31.2 (L)   Platelet Count 296   RBC Count 3.28 (L)   MCV 95   MCH 29.6   MCHC 31.1 (L)   RDW 25.8 (H)   Diff Method Automated Method   % Neutrophils 76.6   % Lymphocytes 8.2   % Monocytes 12.0   % Eosinophils 1.0   % Basophils 0.6   % Immature Granulocytes 1.6   Nucleated RBCs 1 (H)   Absolute Neutrophil 6.0   Absolute Lymphocytes 0.7 (L)   Absolute Monocytes 1.0   Absolute Eosinophils 0.1   Absolute Basophils 0.1   Abs Immature Granulocytes 0.1   Absolute Nucleated RBC 0.1     Assessment and Plan:    1. DLBCL,  "\"double-hit\"-like, currently on treatment with DA-R-EPOCH  Cycle 1 overall tolerated well other than mucositis and mild infusion reaction (rigors) to continuous infusion of chemotherapy. Interval CT CAP after 1 cycle due to elevated LD and uric acid showed response to treatment. She is thus far tolerating treatment fairly well with some fatigue, nausea, and constipation. She will be admitted today for cycle 4, as workup for leg swelling and weight gain was unremarkable. Plan to repeat PET/CT after this cycle. Dose of cycle 4 will be escalated, as neutrophil toro was 0.8 with this cycle.     2. Weight gain and leg swelling. BNP and Echo today prior to admission were okay with grade I or early diastolic dysfunction noted on the final Echo report.    3. Heme  No transfusion needs today. At hospital discharge, continue to monitor with twice weekly labs and transfuse if Hgb<8 and Plt<10k.     4. ID  Continue ppx ACV 400mg BID. Levaquin 250mg daily and fluconazole 200mg daily currently on hold, as not neutropenic.     5. History of stage 1, ER/ND+, HER2- breast cancer s/p bilateral mastectomies and BENJIE/BSO  Follows with Dr. Crawley. Oncotype recurrence score 11%. Was initially on Arimidex ([id number]) but changed to Tamoxifen due to arthralgias. Tamoxifen held since 10/5 and continue to hold with chemotherapy. Last f/u Dr. Crawley was on 11/27/17.    6. History of papillary thyroid cancer, s/p total thyroidectomy  Follows with Dr. Castro. Has post surgical hypothyroidism. Continue levothyroxine and calcitriol as prescribed. Neck u/s on 11/2 shows no evidence of disease.    7. History of Rachael en Y gastric bypass  Continue supplements (calcium citrate-vitamin D, vitamin D3, magnesium citrate). Also has iron deficiency anemia likely from poor absorption and per Dr. Sauceda, she should continue 325mg PO ferrous sulfate TID.     8. Chronic chest wall pain s/p mastectomies  Follows with Dr. Benitez. Palliative care to " continue to prescribe pain medications. Taking MS Contin 30/30/60 mg tid and oxycodone prn, currently 15 mg qid for breakthrough pain, and celebrex.     10. Constipation.  Under control with Colace and MiraLax, which she will continue.      11. Dry skin and peeling.  Possibly secondary to methotrexate with potential for exfoliative dermatitis. Recommend Eucerin cream at least bid.      Jessica Cox PA-C  [facility]  [address], [place] [address] [phone]

## 2017-12-07 NOTE — Clinical Note
"12/7/2017       RE: Tisha Arias  [address] [place] [address]     Dear Colleague,    Thank you for referring your patient, Tisha Arias, to the [facility]. Please see a copy of my visit note below.    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 She is currently undergoing treatment with DA-R-EPOCH. Her past medical history is significant for breast cancer in 2011 s/p bilateral mastectomies and BENJIE/BSO up until recently on Tamoxifen, papillary thyroid cancer s/p total thyroidectomy, chronic chest wall pain, and asthma. Briefly, her oncologic history is as follows:    She presented in 8/2017 with dramatically worse chest and back pain. CT 9/1/17 showed lytic lesion right 10th rib extending to right T9-10 neural foramen with vertebral body invasion. There was associated conglomerate of lymphadenopathy around the mid T-spine. She had a CT guided biopsy showing B-cell high grade lymphoma. Pathology showed \"The morphology shows a population of large cells, diffuse lymphoid infiltrate. The cells are atypical with abundant mitotic and apoptotic activity and single cell necrosis. Tumor is CD45 and CD79a positive and focally weakly CD30 positive. The other stains revealed positivity for CD20, BCL6, BCL2 and MUM1, and CD10 and CD21 negative. The CD5 and CD3 highlighted background T-cells.  MYC expression was equivocal with approximately 40% nuclei staining.  Ki-67 proliferative index is greater than 90-95%. The immunohistochemistry is suggestive of non-GCB immunophenotype of diffuse large B-cell lymphoma. The cytogenetics did not show rearrangement of the BCL2 or c-MYC. There is the presence of BCL6 rearrangement in 78% of cells and gains of MYC and BCL2, but no amplifications.\"     Staging LP and BMBx were negative " for lymphoma. She started DA-REPOCH 10/6/17. She did well with chemo other than rigors during CIVI. D/c 10/11/17. Given Neulasta 10/12/17. Post cycle 1 complicated by mucositis and worsening of chronic LE peripheral edema. She began cycle 2 on 10/27/17. Due to a rise in her LD and uric acid levels on that admission day, she underwent a CT scan which showed response to therapy with improvement in her mediastinal lymphadenopathy and right chest wall mass. She comes in today for routine follow up prior to admission for cycle 4 DA-R-EPOCH.     Interval History:       Current Outpatient Prescriptions   Medication Sig Dispense Refill     allopurinol (ZYLOPRIM) 300 MG tablet Take 300 mg by mouth daily  0     triamcinolone (KENALOG) 0.025 % ointment Apply topically as needed  3     morphine (MS CONTIN) 30 MG 12 hr tablet Take 1 tablet (30 mg) by mouth every 8 hours . Take an addition pill at night such that your evening dose is 60mg 120 tablet 0     oxyCODONE IR (ROXICODONE) 30 MG tablet Take 1 tablet (30 mg) by mouth every 4 hours as needed for moderate to severe pain 180 tablet 0     lidocaine (XYLOCAINE) 5 % ointment Apply quarter size amount to chest and back up to 3 times daily as needed for pain. 350 g 1     montelukast (SINGULAIR) 10 MG tablet Take 1 tablet (10 mg) by mouth 2 times daily (Patient taking differently: Take 20 mg by mouth 2 times daily ) 60 tablet 0     ondansetron (ZOFRAN-ODT) 8 MG ODT tab Take 1 tablet (8 mg) by mouth every 8 hours as needed 30 tablet 1     fluconazole (DIFLUCAN) 200 MG tablet Take 1 tablet (200 mg) by mouth daily 30 tablet 0     acyclovir (ZOVIRAX) 400 MG tablet Take 1 tablet (400 mg) by mouth 2 times daily 60 tablet 0     furosemide (LASIX) 20 MG tablet Take 1 tablet (20 mg) by mouth 2 times daily 60 tablet 0     levofloxacin (LEVAQUIN) 250 MG tablet Take 1 tablet (250 mg) by mouth daily 30 tablet 0     magic mouthwash suspension (diphenhydrAMINE, lidocaine, aluminum-magnesium &  simethicone) Swish and spit 10 mLs in mouth every 6 hours as needed for mouth sores 360 mL 1     diclofenac (VOLTAREN) 1 % GEL topical gel Apply affected area two times daily PRN using enclosed dosing card. 100 g 0     cetirizine (ZYRTEC ALLERGY) 10 MG tablet Take 1 tablet (10 mg) by mouth 3 times daily On hold for lab test. 30 tablet 0     ferrous sulfate (IRON) 325 (65 FE) MG tablet Take 1 tablet (325 mg) by mouth 3 times daily (with meals) 90 tablet 0     bisacodyl (DULCOLAX) 5 MG EC tablet Take 3 tablets (15 mg) by mouth daily as needed for constipation 30 tablet 0     polyethylene glycol (MIRALAX) powder Take 17 g (1 capful) by mouth daily (Patient taking differently: Take 1 capful by mouth daily as needed ) 510 g 0     cyclobenzaprine (FLEXERIL) 10 MG tablet Take 1 tablet (10 mg) by mouth 3 times daily as needed 90 tablet 0     senna-docusate (SENOKOT-S;PERICOLACE) 8.6-50 MG per tablet Take 1-2 tablets by mouth 2 times daily as needed for constipation 60 tablet 0     UNABLE TO FIND Take 2 capsules by mouth 3 times daily Muscle Mag. 2 caps contain B1 20mg, B2 20mg, B6 10mg, magesium 20mg, manganese 2mg.       Morphine Sulfate (MS CONTIN PO) Take 60 mg by mouth daily Takes at 8pm       lidocaine-prilocaine (EMLA) cream Apply topically as needed (port access pain.) 30 g 1     calcitRIOL (ROCALTROL) 0.25 MCG capsule TAKE 2 CAPSULES BY MOUTH EVERY MORNING AND TAKE 1 CAPSULE BY MOUTH EVERY NIGHT AT BEDTIME (Patient taking differently: TAKE 2 CAPSULES BY MOUTH EVERY MORNING AND TAKE 1 CAPSULE BY MOUTH at noon) 270 capsule 3     celecoxib (CELEBREX) 200 MG capsule TAKE ONE CAPSULE BY MOUTH TWICE DAILY  56 capsule 0     order for DME Compression stockings, medium grade, please measure and fit. Please dispense a pair. 1 Units 0     methocarbamol (ROBAXIN) 750 MG tablet Take 1 tablet (750 mg) by mouth 4 times daily as needed . Ok to take a 5th Robaxin on very severe days. (Patient taking differently: Take 750 mg by mouth  4 times daily . Ok to take a 5th Robaxin on very severe days.) 360 tablet 1     diazepam (VALIUM) 5 MG tablet Take 1 tablet (5 mg) by mouth 3 times daily as needed For muscle spasms (Patient taking differently: Take 5 mg by mouth 3 times daily For muscle spasms) 90 tablet 2     levothyroxine (SYNTHROID/LEVOTHROID) 112 MCG tablet Mon-Sat: (2 tabs daily) Sunday: 3 tabs (Patient taking differently: 112 mcg Mon-Sat: (2 tabs daily) Sunday: 3 tabs) 189 tablet 3     hydrochlorothiazide (MICROZIDE) 12.5 MG capsule Take 1 capsule (12.5 mg) by mouth daily 90 capsule 2     potassium chloride SA (POTASSIUM CHLORIDE) 20 MEQ CR tablet Take 1 tablet (20 mEq) by mouth daily 90 tablet 3     cromolyn (OPTICROM) 4 % ophthalmic solution Place 1 drop into both eyes 4 times daily 10 mL 5     Cholecalciferol (VITAMIN D3) 2000 UNITS CAPS Take 5,000 Units by mouth daily Takes 2 tabs daily 60 capsule 4     order for DME Equipment being ordered: compression stockings. 20-30 mm or 30 - 40 mm as patient can tolerate. Physical therapy to determine if they should be above or below the knee. 2 Units 4     ranitidine (ZANTAC) 75 MG tablet Take 1 tablet (75 mg) by mouth 3 times daily 270 tablet 3     UNABLE TO FIND MEDICATION NAME: Tumeric 3 capsules daily (on hold during chemo)       UNABLE TO FIND 2 tablets 3 times daily MEDICATION NAME: Natural D-Hist (on hold during chemo)       UNABLE TO FIND 3 tablets 3 times daily MEDICATION NAME: calcium D-Glucarate   3 caps contain 180mg of elemental calcium.       UNABLE TO FIND 2 tablets 3 times daily MEDICATION NAME: Digestzymes        UNABLE TO FIND 1 tablet daily MEDICATION NAME: Pure Encapsulations       Probiotic Product (PROBIOTIC DAILY PO) Take 1 capsule by mouth daily Lacto acid bifidobacterium       dexamethasone (DECADRON) 4 MG tablet Take 2 tablets (8 mg) by mouth daily for 2 days On Wed 11/22 and Thursday 11/23 4 tablet 0     prochlorperazine (COMPAZINE) 10 MG tablet Take 1 tablet (10 mg) by  mouth every 6 hours as needed for nausea or vomiting (Patient not taking: Reported on 12/7/2017) 60 tablet 3     docusate sodium (COLACE) 100 MG tablet Take 100 mg by mouth daily (Patient not taking: Reported on 12/7/2017) 30 tablet 0     SUMAtriptan (IMITREX) 50 MG tablet Take 1 tablet (50 mg) by mouth at onset of headache for migraine (may repeat in 2 hours as needed, max 2 tablets daily) (Patient not taking: Reported on 12/7/2017) 9 tablet 3     diphenhydrAMINE (BENADRYL) 50 MG capsule Take 1 capsule (50 mg) by mouth nightly as needed for itching or sleep (Patient not taking: Reported on 11/16/2017) 56 capsule      COMPOUND CONTAINING CONTROLLED SUBSTANCE (CMPD RX) - PHARMACY TO MIX COMPOUNDED MEDICATION Apply small amount to affected area two times daily. (Patient not taking: Reported on 11/27/2017) 120 g 6     EPINEPHrine (EPIPEN 2-CARIDAD) 0.3 MG/0.3ML injection Inject 0.3 mLs (0.3 mg) into the muscle once as needed for anaphylaxis (Patient not taking: Reported on 12/7/2017) 0.6 mL 3     VENTOLIN  (90 BASE) MCG/ACT Inhaler INHALE 2 PUFFS INTO THE LUNGS EVERY 4 HOURS AS NEEDED FOR SHORTNESS OF BREATH OR DIFFICULT BREATHING OR WHEEZING (Patient not taking: Reported on 12/7/2017) 18 g 3     NASALCROM 5.2 MG/ACT AERS Inhaler SPRAY ONE SPRAY( 1 ML) IN NOSTRIL DAILY (Patient not taking: Reported on 12/7/2017) 1 Bottle 6     order for DME Equipment being ordered: compression bra (Patient not taking: Reported on 12/7/2017) 2 Device 1     aluminum chloride (DRYSOL) 20 % external solution Apply topically At Bedtime (Patient not taking: Reported on 12/7/2017) 60 mL 3     mometasone (ASMANEX 30 METERED DOSES) 110 MCG/INH inhaler Inhale 1 puff into the lungs daily (Patient not taking: Reported on 12/7/2017) 3 Inhaler 3     calcium citrate-vitamin D (CALCIUM CITRATE +D) 315-250 MG-UNIT TABS Take 2 tablets by mouth twice a week  120 tablet      calcium carbonate (TUMS) 500 MG chewable tablet Take 2 tablets (1,000 mg) by  "mouth nightly as needed for other (cramps) 180 chew tab 3     Triamcinolone Acetonide (AZMACORT IN) Inhale 2 puffs into the lungs as needed       Physical Examination:  /75 (BP Location: Right arm, Patient Position: Sitting, Cuff Size: Adult Regular)  Pulse 130  Temp 97.7  F (36.5  C) (Oral)  Ht 1.605 m (5' 3.19\")  Wt 86.3 kg (190 lb 4.8 oz)  SpO2 100%  BMI 33.51 kg/m2  Wt Readings from Last 10 Encounters:   12/07/17 86.3 kg (190 lb 4.8 oz)   11/28/17 84.3 kg (185 lb 12.8 oz)   11/28/17 84.3 kg (185 lb 12.8 oz)   11/27/17 83 kg (182 lb 14.4 oz)   11/22/17 83.8 kg (184 lb 11.2 oz)   11/20/17 85.2 kg (187 lb 12.8 oz)   11/16/17 85.1 kg (187 lb 11.2 oz)   11/13/17 85.3 kg (188 lb)   11/10/17 84.8 kg (187 lb)   11/06/17 83.3 kg (183 lb 9.6 oz)   Constitutional: Well-appearing female in no acute distress.  Eyes: EOMI, PERRL. No scleral icterus.  ENT: Oral mucosa is moist without lesions or thrush.   Lymphatic: Neck is supple without cervical or supraclavicular lymphadenopathy.   Cardiovascular: Regular rate and rhythm.  Respiratory: Clear to auscultation bilaterally. No wheezes or crackles.  Gastrointestinal: Bowel sounds present. Abdomen soft, nontender.    Neurologic: Cranial nerves II through XII are intact.   Skin: No rashes, petechiae, or bruising noted on exposed skin. Two dry mildly hyperpigmented patches noted on the right posterior lower leg.   Extremities: 1+ lower extremity edema bilaterally, compression stockings in place.    Laboratory Data:    Assessment and Plan:    1. DLBCL, \"double-hit\"-like, currently on treatment with DA-R-EPOCH  Cycle 1 overall tolerated well other than mucositis and mild infusion reaction (rigors) to continuous infusion of chemotherapy. Interval CT CAP after 1 cycle due to elevated LD and uric acid showed response to treatment. She is thus far tolerating treatment fairly well with some fatigue, nausea, and constipation. She will be admitted today for cycle 4, assuming " workup for leg swelling and weight gain is okay. Plan to repeat PET/CT after this cycle.    2. Weight gain and leg swelling. Will assess with BNP and Echo today prior to admission.    3. Heme  No transfusion needs today. At hospital discharge, continue to monitor with twice weekly labs and transfuse if Hgb<8 and Plt<10k.     4. ID  Continue ppx ACV 400mg BID. Levaquin 250mg daily and fluconazole 200mg daily currently on hold, as no longer neutropenic.     5. History of stage 1, ER/WY+, HER2- breast cancer s/p bilateral mastectomies and BENJIE/BSO  Follows with Dr. Crawley. Oncotype recurrence score 11%. Was initially on Arimidex ([id number]) but changed to Tamoxifen due to arthralgias. Tamoxifen held since 10/5 and continue to hold with chemotherapy. Last f/u Dr. Crawley was on 11/27/17.    6. History of papillary thyroid cancer, s/p total thyroidectomy  Follows with Dr. Castro. Has post surgical hypothyroidism. Continue levothyroxine and calcitriol as prescribed. Neck u/s on 11/2 shows no evidence of disease.    7. History of Rachael en Y gastric bypass  Continue supplements (calcium citrate-vitamin D, vitamin D3, magnesium citrate). Also has iron deficiency anemia likely from poor absorption and per Dr. Sauceda, she should continue 325mg PO ferrous sulfate TID.     8. Chronic chest wall pain s/p mastectomies  Follows with Dr. Benitez. Palliative care to continue to prescribe pain medications. Taking MS Contin 30/30/60 mg tid and oxycodone prn, currently 15 mg qid for breakthrough pain, and celebrex.     10. Constipation.  Under control with Colace and MiraLax, which she will continue.      11. Dry skin and peeling.  Possibly secondary to methotrexate with potential for exfoliative dermatitis. Recommend Eucerin cream at least bid.      Jessica Cox PA-C  [facility]  [address] [place] [address] [phone]      Pt asked for clarification of whether or not her chemo doses would be increased  with this cycle as she was leaving to head to [facility] for admission today.   Per Jessica Cox PA-C : Yes, and orders have been adjusted and inpt team is aware. Clarified that this is based on toro of previous cycle, not today's counts.   Phone call to pt: unable to notify pt of this information yet, as per her request due to no answer and her cell VMbox is full. CASSIDY Guan  HPI      ROS      Physical Exam        Again, thank you for allowing me to participate in the care of your patient.      Sincerely,    Jessica Cox PA-C

## 2017-12-07 NOTE — LETTER
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 She is currently undergoing treatment with DA-R-EPOCH. Her past medical history is significant for breast cancer in 2011 s/p bilateral mastectomies and BENJIE/BSO up until recently on Tamoxifen, papillary thyroid cancer s/p total thyroidectomy, chronic chest wall pain, and asthma. Briefly, her oncologic history is as follows:    She presented in 8/2017 with dramatically worse chest and back pain. CT 9/1/17 showed lytic lesion right 10th rib extending to right T9-10 neural foramen with vertebral body invasion. There was associated conglomerate of lymphadenopathy around the mid T-spine. She had a CT guided biopsy showing B-cell high grade lymphoma. Pathology showed \"The morphology shows a population of large cells, diffuse lymphoid infiltrate. The cells are atypical with abundant mitotic and apoptotic activity and single cell necrosis. Tumor is CD45 and CD79a positive and focally weakly CD30 positive. The other stains revealed positivity for CD20, BCL6, BCL2 and MUM1, and CD10 and CD21 negative. The CD5 and CD3 highlighted background T-cells.  MYC expression was equivocal with approximately 40% nuclei staining.  Ki-67 proliferative index is greater than 90-95%. The immunohistochemistry is suggestive of non-GCB immunophenotype of diffuse large B-cell lymphoma. The cytogenetics did not show rearrangement of the BCL2 or c-MYC. There is the presence of BCL6 rearrangement in 78% of cells and gains of MYC and BCL2, but no amplifications.\"     Staging LP and BMBx were negative for lymphoma. She started DA-REPOCH 10/6/17. She did well with chemo other than rigors during CIVI. D/c 10/11/17. Given Neulasta 10/12/17. Post cycle 1 complicated " by mucositis and worsening of chronic LE peripheral edema. She began cycle 2 on 10/27/17. Due to a rise in her LD and uric acid levels on that admission day, she underwent a CT scan which showed response to therapy with improvement in her mediastinal lymphadenopathy and right chest wall mass. She comes in today for routine follow up prior to admission for cycle 4 DA-R-EPOCH.     Interval History:  Patient reports that with this last cycle she has noticed more peeling of the skin on her fingers and feet. She has been applying an Aveno lotion to her fingers with some relief. She did also notice some peeling skin on her trunk and arms. She reports that the numbness in her fingers and toes has gotten a little bit worse. She does sometimes stumble with her feet but denies any falls. She denies any difficulty doing buttons or zippers. She has mild pain with this. She has had some times of feeling lightheaded improved with drinking more fluids. She does have intermittent headaches from the back of her head to the front usually alleviated with Tylenol. She has taken Imitrex twice in the last month. Since her last cycle of chemotherapy, she has had 2 episodes of increased chest pain. Her pain is at its baseline right now. She continues on the MS Contin 30/30/60 mg tid and is currently taking 15 mg of oxycodone about 4 times a day. She does note that stairs are hard for her. She did have nausea earlier this week with dry heaves that improved with her antiemetics. Her constipation is managed with Maya-Colace and MiraLAX. She reports feeling tired and her work has allowed her to change her start time to be 9:30 which she will start after this admission. She reports that she is eating fair and does drink some Ensure. She reports drinking more than 64 ounces of fluid a day. She has noticed increased leg swelling for unclear reasons. She denies other concerns.    Review of Systems:  Patient denies any of the following except if  noted above: fevers, chills, vision or hearing changes, dyspnea, abdominal pain, current nausea, vomiting, diarrhea, urinary concerns, issues with sleep or mood.      Current Outpatient Prescriptions   Medication Sig Dispense Refill     allopurinol (ZYLOPRIM) 300 MG tablet Take 300 mg by mouth daily  0     triamcinolone (KENALOG) 0.025 % ointment Apply topically as needed  3     morphine (MS CONTIN) 30 MG 12 hr tablet Take 1 tablet (30 mg) by mouth every 8 hours . Take an addition pill at night such that your evening dose is 60mg 120 tablet 0     oxyCODONE IR (ROXICODONE) 30 MG tablet Take 1 tablet (30 mg) by mouth every 4 hours as needed for moderate to severe pain 180 tablet 0     lidocaine (XYLOCAINE) 5 % ointment Apply quarter size amount to chest and back up to 3 times daily as needed for pain. 350 g 1     montelukast (SINGULAIR) 10 MG tablet Take 1 tablet (10 mg) by mouth 2 times daily (Patient taking differently: Take 20 mg by mouth 2 times daily ) 60 tablet 0     ondansetron (ZOFRAN-ODT) 8 MG ODT tab Take 1 tablet (8 mg) by mouth every 8 hours as needed 30 tablet 1     fluconazole (DIFLUCAN) 200 MG tablet Take 1 tablet (200 mg) by mouth daily 30 tablet 0     acyclovir (ZOVIRAX) 400 MG tablet Take 1 tablet (400 mg) by mouth 2 times daily 60 tablet 0     furosemide (LASIX) 20 MG tablet Take 1 tablet (20 mg) by mouth 2 times daily 60 tablet 0     levofloxacin (LEVAQUIN) 250 MG tablet Take 1 tablet (250 mg) by mouth daily 30 tablet 0     magic mouthwash suspension (diphenhydrAMINE, lidocaine, aluminum-magnesium & simethicone) Swish and spit 10 mLs in mouth every 6 hours as needed for mouth sores 360 mL 1     diclofenac (VOLTAREN) 1 % GEL topical gel Apply affected area two times daily PRN using enclosed dosing card. 100 g 0     cetirizine (ZYRTEC ALLERGY) 10 MG tablet Take 1 tablet (10 mg) by mouth 3 times daily On hold for lab test. 30 tablet 0     ferrous sulfate (IRON) 325 (65 FE) MG tablet Take 1 tablet (325  mg) by mouth 3 times daily (with meals) 90 tablet 0     bisacodyl (DULCOLAX) 5 MG EC tablet Take 3 tablets (15 mg) by mouth daily as needed for constipation 30 tablet 0     polyethylene glycol (MIRALAX) powder Take 17 g (1 capful) by mouth daily (Patient taking differently: Take 1 capful by mouth daily as needed ) 510 g 0     cyclobenzaprine (FLEXERIL) 10 MG tablet Take 1 tablet (10 mg) by mouth 3 times daily as needed 90 tablet 0     senna-docusate (SENOKOT-S;PERICOLACE) 8.6-50 MG per tablet Take 1-2 tablets by mouth 2 times daily as needed for constipation 60 tablet 0     UNABLE TO FIND Take 2 capsules by mouth 3 times daily Muscle Mag. 2 caps contain B1 20mg, B2 20mg, B6 10mg, magesium 20mg, manganese 2mg.       Morphine Sulfate (MS CONTIN PO) Take 60 mg by mouth daily Takes at 8pm       lidocaine-prilocaine (EMLA) cream Apply topically as needed (port access pain.) 30 g 1     calcitRIOL (ROCALTROL) 0.25 MCG capsule TAKE 2 CAPSULES BY MOUTH EVERY MORNING AND TAKE 1 CAPSULE BY MOUTH EVERY NIGHT AT BEDTIME (Patient taking differently: TAKE 2 CAPSULES BY MOUTH EVERY MORNING AND TAKE 1 CAPSULE BY MOUTH at noon) 270 capsule 3     celecoxib (CELEBREX) 200 MG capsule TAKE ONE CAPSULE BY MOUTH TWICE DAILY  56 capsule 0     order for DME Compression stockings, medium grade, please measure and fit. Please dispense a pair. 1 Units 0     methocarbamol (ROBAXIN) 750 MG tablet Take 1 tablet (750 mg) by mouth 4 times daily as needed . Ok to take a 5th Robaxin on very severe days. (Patient taking differently: Take 750 mg by mouth 4 times daily . Ok to take a 5th Robaxin on very severe days.) 360 tablet 1     diazepam (VALIUM) 5 MG tablet Take 1 tablet (5 mg) by mouth 3 times daily as needed For muscle spasms (Patient taking differently: Take 5 mg by mouth 3 times daily For muscle spasms) 90 tablet 2     levothyroxine (SYNTHROID/LEVOTHROID) 112 MCG tablet Mon-Sat: (2 tabs daily) Sunday: 3 tabs (Patient taking differently: 112 mcg  Mon-Sat: (2 tabs daily) Sunday: 3 tabs) 189 tablet 3     hydrochlorothiazide (MICROZIDE) 12.5 MG capsule Take 1 capsule (12.5 mg) by mouth daily 90 capsule 2     potassium chloride SA (POTASSIUM CHLORIDE) 20 MEQ CR tablet Take 1 tablet (20 mEq) by mouth daily 90 tablet 3     cromolyn (OPTICROM) 4 % ophthalmic solution Place 1 drop into both eyes 4 times daily 10 mL 5     Cholecalciferol (VITAMIN D3) 2000 UNITS CAPS Take 5,000 Units by mouth daily Takes 2 tabs daily 60 capsule 4     order for DME Equipment being ordered: compression stockings. 20-30 mm or 30 - 40 mm as patient can tolerate. Physical therapy to determine if they should be above or below the knee. 2 Units 4     ranitidine (ZANTAC) 75 MG tablet Take 1 tablet (75 mg) by mouth 3 times daily 270 tablet 3     UNABLE TO FIND MEDICATION NAME: Tumeric 3 capsules daily (on hold during chemo)       UNABLE TO FIND 2 tablets 3 times daily MEDICATION NAME: Natural D-Hist (on hold during chemo)       UNABLE TO FIND 3 tablets 3 times daily MEDICATION NAME: calcium D-Glucarate   3 caps contain 180mg of elemental calcium.       UNABLE TO FIND 2 tablets 3 times daily MEDICATION NAME: Digestzymes        UNABLE TO FIND 1 tablet daily MEDICATION NAME: Pure Encapsulations       Probiotic Product (PROBIOTIC DAILY PO) Take 1 capsule by mouth daily Lacto acid bifidobacterium       dexamethasone (DECADRON) 4 MG tablet Take 2 tablets (8 mg) by mouth daily for 2 days On Wed 11/22 and Thursday 11/23 4 tablet 0     prochlorperazine (COMPAZINE) 10 MG tablet Take 1 tablet (10 mg) by mouth every 6 hours as needed for nausea or vomiting (Patient not taking: Reported on 12/7/2017) 60 tablet 3     docusate sodium (COLACE) 100 MG tablet Take 100 mg by mouth daily (Patient not taking: Reported on 12/7/2017) 30 tablet 0     SUMAtriptan (IMITREX) 50 MG tablet Take 1 tablet (50 mg) by mouth at onset of headache for migraine (may repeat in 2 hours as needed, max 2 tablets daily) (Patient not  "taking: Reported on 12/7/2017) 9 tablet 3     diphenhydrAMINE (BENADRYL) 50 MG capsule Take 1 capsule (50 mg) by mouth nightly as needed for itching or sleep (Patient not taking: Reported on 11/16/2017) 56 capsule      COMPOUND CONTAINING CONTROLLED SUBSTANCE (CMPD RX) - PHARMACY TO MIX COMPOUNDED MEDICATION Apply small amount to affected area two times daily. (Patient not taking: Reported on 11/27/2017) 120 g 6     EPINEPHrine (EPIPEN 2-CARIDAD) 0.3 MG/0.3ML injection Inject 0.3 mLs (0.3 mg) into the muscle once as needed for anaphylaxis (Patient not taking: Reported on 12/7/2017) 0.6 mL 3     VENTOLIN  (90 BASE) MCG/ACT Inhaler INHALE 2 PUFFS INTO THE LUNGS EVERY 4 HOURS AS NEEDED FOR SHORTNESS OF BREATH OR DIFFICULT BREATHING OR WHEEZING (Patient not taking: Reported on 12/7/2017) 18 g 3     NASALCROM 5.2 MG/ACT AERS Inhaler SPRAY ONE SPRAY( 1 ML) IN NOSTRIL DAILY (Patient not taking: Reported on 12/7/2017) 1 Bottle 6     order for DME Equipment being ordered: compression bra (Patient not taking: Reported on 12/7/2017) 2 Device 1     aluminum chloride (DRYSOL) 20 % external solution Apply topically At Bedtime (Patient not taking: Reported on 12/7/2017) 60 mL 3     mometasone (ASMANEX 30 METERED DOSES) 110 MCG/INH inhaler Inhale 1 puff into the lungs daily (Patient not taking: Reported on 12/7/2017) 3 Inhaler 3     calcium citrate-vitamin D (CALCIUM CITRATE +D) 315-250 MG-UNIT TABS Take 2 tablets by mouth twice a week  120 tablet      calcium carbonate (TUMS) 500 MG chewable tablet Take 2 tablets (1,000 mg) by mouth nightly as needed for other (cramps) 180 chew tab 3     Triamcinolone Acetonide (AZMACORT IN) Inhale 2 puffs into the lungs as needed       Physical Examination:  /75 (BP Location: Right arm, Patient Position: Sitting, Cuff Size: Adult Regular)  Pulse 130  Temp 97.7  F (36.5  C) (Oral)  Ht 1.605 m (5' 3.19\")  Wt 86.3 kg (190 lb 4.8 oz)  SpO2 100%  BMI 33.51 kg/m2  Wt Readings from Last " 10 Encounters:   12/07/17 86.3 kg (190 lb 4.8 oz)   11/28/17 84.3 kg (185 lb 12.8 oz)   11/28/17 84.3 kg (185 lb 12.8 oz)   11/27/17 83 kg (182 lb 14.4 oz)   11/22/17 83.8 kg (184 lb 11.2 oz)   11/20/17 85.2 kg (187 lb 12.8 oz)   11/16/17 85.1 kg (187 lb 11.2 oz)   11/13/17 85.3 kg (188 lb)   11/10/17 84.8 kg (187 lb)   11/06/17 83.3 kg (183 lb 9.6 oz)   Constitutional: Well-appearing female in no acute distress.  Eyes: EOMI, PERRL. No scleral icterus.  ENT: Oral mucosa is moist without lesions or thrush.   Lymphatic: Neck is supple without cervical or supraclavicular lymphadenopathy.   Cardiovascular: Mild tachycardia with regular rhythm.  Respiratory: Clear to auscultation bilaterally. No wheezes or crackles.  Gastrointestinal: Bowel sounds present. Abdomen soft, nontender.  No masses palpated.   Neurologic: Cranial nerves II through XII are intact.   Skin: Skin on fingertips and the bottoms of her feet is dry and mildly erythematous.    Extremities: 1+ lower extremity edema bilaterally.    Laboratory Data:   12/7/2017 09:08   Sodium 140   Potassium 3.4   Chloride 102   Carbon Dioxide 30   Urea Nitrogen 14   Creatinine 0.90   GFR Estimate 64   GFR Estimate If Black 78   Calcium 8.4 (L)   Anion Gap 9   Magnesium 2.0   Phosphorus 4.2   Albumin 3.0 (L)   Protein Total 6.2 (L)   Bilirubin Total 0.3   Alkaline Phosphatase 89   ALT 27   AST 26   Lactate Dehydrogenase 382 (H)   N-Terminal Pro Bnp 39   Uric Acid 6.3 (H)   Glucose 120 (H)   WBC 7.9   Hemoglobin 9.7 (L)   Hematocrit 31.2 (L)   Platelet Count 296   RBC Count 3.28 (L)   MCV 95   MCH 29.6   MCHC 31.1 (L)   RDW 25.8 (H)   Diff Method Automated Method   % Neutrophils 76.6   % Lymphocytes 8.2   % Monocytes 12.0   % Eosinophils 1.0   % Basophils 0.6   % Immature Granulocytes 1.6   Nucleated RBCs 1 (H)   Absolute Neutrophil 6.0   Absolute Lymphocytes 0.7 (L)   Absolute Monocytes 1.0   Absolute Eosinophils 0.1   Absolute Basophils 0.1   Abs Immature Granulocytes 0.1  "  Absolute Nucleated RBC 0.1     Assessment and Plan:    1. DLBCL, \"double-hit\"-like, currently on treatment with DA-R-EPOCH  Cycle 1 overall tolerated well other than mucositis and mild infusion reaction (rigors) to continuous infusion of chemotherapy. Interval CT CAP after 1 cycle due to elevated LD and uric acid showed response to treatment. She is thus far tolerating treatment fairly well with some fatigue, nausea, and constipation. She will be admitted today for cycle 4, as workup for leg swelling and weight gain was unremarkable. Plan to repeat PET/CT after this cycle. Dose of cycle 4 will be escalated, as neutrophil toro was 0.8 with this cycle.     2. Weight gain and leg swelling. BNP and Echo today prior to admission were okay with grade I or early diastolic dysfunction noted on the final Echo report.    3. Heme  No transfusion needs today. At hospital discharge, continue to monitor with twice weekly labs and transfuse if Hgb<8 and Plt<10k.     4. ID  Continue ppx ACV 400mg BID. Levaquin 250mg daily and fluconazole 200mg daily currently on hold, as not neutropenic.     5. History of stage 1, ER/WY+, HER2- breast cancer s/p bilateral mastectomies and BENJIE/BSO  Follows with Dr. Crawley. Oncotype recurrence score 11%. Was initially on Arimidex ([id number]) but changed to Tamoxifen due to arthralgias. Tamoxifen held since 10/5 and continue to hold with chemotherapy. Last f/u Dr. Crawley was on 11/27/17.    6. History of papillary thyroid cancer, s/p total thyroidectomy  Follows with Dr. Castro. Has post surgical hypothyroidism. Continue levothyroxine and calcitriol as prescribed. Neck u/s on 11/2 shows no evidence of disease.    7. History of Rachael en Y gastric bypass  Continue supplements (calcium citrate-vitamin D, vitamin D3, magnesium citrate). Also has iron deficiency anemia likely from poor absorption and per Dr. Sauceda, she should continue 325mg PO ferrous sulfate TID.     8. Chronic chest wall pain s/p " mastectomies  Follows with Dr. Benitez. Palliative care to continue to prescribe pain medications. Taking MS Contin 30/30/60 mg tid and oxycodone prn, currently 15 mg qid for breakthrough pain, and celebrex.     10. Constipation.  Under control with Colace and MiraLax, which she will continue.      11. Dry skin and peeling.  Possibly secondary to methotrexate with potential for exfoliative dermatitis. Recommend Eucerin cream at least bid.      Jessica Cox PA-C  [facility] [place] [id number] [phone]      Pt asked for clarification of whether or not her chemo doses would be increased with this cycle as she was leaving to head to [facility] for admission today.   Per Jessica Cox PA-C : Yes, and orders have been adjusted and inpt team is aware. Clarified that this is based on toro of previous cycle, not today's counts.   Phone call to pt: unable to notify pt of this information yet, as per her request due to no answer and her cell VMbox is full. CASSIDY Guan  HPI        Physic    Jessica Cox PA-C

## 2017-12-07 NOTE — NURSING NOTE
"Oncology Rooming Note    December 7, 2017 7:01 AM   Tisha Arias is a 57 year old female who presents for:    Chief Complaint   Patient presents with     Oncology Clinic Visit     Follow up-Lymphoma     Initial Vitals: /75 (BP Location: Right arm, Patient Position: Sitting, Cuff Size: Adult Regular)  Pulse 130  Temp 97.7  F (36.5  C) (Oral)  Ht 1.605 m (5' 3.19\")  Wt 86.3 kg (190 lb 4.8 oz)  SpO2 100%  BMI 33.51 kg/m2 Estimated body mass index is 33.51 kg/(m^2) as calculated from the following:    Height as of this encounter: 1.605 m (5' 3.19\").    Weight as of this encounter: 86.3 kg (190 lb 4.8 oz). Body surface area is 1.96 meters squared.  Extreme Pain (9) Comment: Port, mid. back   No LMP recorded. Patient has had a hysterectomy.  Allergies reviewed: Yes  Medications reviewed: Yes    Medications: Medication refills not needed today.  Pharmacy name entered into [facility]: [id number] - [place], [place] - [address] [address] [place]    Clinical concerns: Blisters on Fingers and Left foot Jessica Connellyveda was notified.    10 minutes for nursing intake (face to face time)     Lyla Young LPN              "

## 2017-12-07 NOTE — NURSING NOTE
"Chief Complaint   Patient presents with     Port Draw     Labs collected from port by RN (already accessed). Line flushed with saline and heparin.     Port accessed with 20g 3/4\" gripper needle by RN, labs collected, line flushed with saline and heparin.    Daly Hooper, RN  "

## 2017-12-07 NOTE — IP AVS SNAPSHOT
MRN:[id number]                      After Visit Summary   12/7/2017    Tisha Arias    MRN: [id number]           Thank you!     Thank you for choosing [facility] for your care. Our goal is always to provide you with excellent care. Hearing back from our patients is one way we can continue to improve our services. Please take a few minutes to complete the written survey that you may receive in the mail after you visit with us. Thank you!        Patient Information     Date Of Birth          1960        Designated Caregiver       Most Recent Value    Caregiver    Will someone help with your care after discharge? yes    Name of designated caregiver Yomi    Phone number of caregiver [phone]    Caregiver address same address      About your hospital stay     You were admitted on:  December 7, 2017 You last received care in the:  [address] [facility]    You were discharged on:  December 11, 2017        Reason for your hospital stay       Admitted for cycle 4 DA-R-EPOCH                  Who to Call     For medical emergencies, please call 911.  For non-urgent questions about your medical care, please call your primary care provider or clinic, [phone]          Attending Provider     Provider Specialty    Jg Olvera MD Internal Medicine    Ely Alanis MD Hematology & Oncology       Primary Care Provider Office Phone # Fax #    Shahla Crawley [phone]       When to contact your care team       Please call the [facility] Triage RN Line [phone] ([facility] RN available M-F 8-5, after 5 pm the RN Advisor will page the On-Call Oncology Fellow who will return your call) for temperature greater than 100.4 F, uncontrolled nausea/vomiting/diarrhea or unrelieved constipation, pain not relieved by medications, bleeding not stopped by pressure, dizziness, chest pain, shortness of breath, changes in level of consciousness, or any other new concerning  symptoms.                  After Care Instructions     Activity       Your activity upon discharge: activity as tolerated            Diet       Follow this diet upon discharge: regular diet as tolerated                  Follow-up Appointments     Adult [facility] Follow-up and recommended labs and tests       - Please f/u as scheduled (12/13 labs+SANJANA+neulasta), 12/15 & 12/19 labs, 12/22 PET (r/t ongoing chest & back pain per Sohail last clinic note), 12/26 (labs & sohail), pending admit 12/28 per Sohail's last clinic note.     Appointments on [place] and/or [facility] (with [facility] or [facility] provider or service). Call [phone] if you haven't heard regarding these appointments within 7 days of discharge.            Follow Up and recommended labs and tests       - CBC & CMP 2 x weekly per DA-R-EPOCH regimen  - PET scheduled 12/22/17                  Your next 10 appointments already scheduled     Dec 13, 2017  7:30 AM CST   [facility] Lab Draw with  [facility] DRAW   [facility] Lab Draw ([facility])    [address] [place] [address]   [phone]            Dec 13, 2017  8:00 AM CST   (Arrive by 7:45 AM)   Return Visit with Evelia Johns PA-C   [facility] ([facility])    [address] [place] [address]   [phone]            Dec 15, 2017  9:00 AM CST   [facility] Lab Draw with  [facility] DRAW   [facility]    [address] [place] [address]   [phone]            Dec 19, 2017  7:30 AM CST   [facility] Lab Draw with  [facility] LAB DRAW   [facility] ([facility])    [address] [place] [address]   [phone]            Dec 22, 2017  9:00 AM CST   (Arrive by 8:45 AM)   [facility]  [place] [facility] ([facility] [place])    [address] [facility] [address]   [phone]           Tell your doctor:   If there is any chance you may be pregnant or if you are breastfeeding.   If you have problems lying in small spaces (claustrophobia). If you do, your doctor may give you medicine to help you relax. If you have diabetes:   Have your exam early in the morning. Your blood glucose will go up as the day goes by.   Your glucose level must be 180 or less at the start of the exam. Please take any medicines you need to ensure this blood glucose level. 24 hours before your scan: Don t do any heavy exercise. (No jogging, aerobics or other workouts.) Exercise will make your pictures less accurate. 6 hours before your scan:   Stop all food and liquids (except water).   Do not chew gum or suck on mints.   If you need to take medicine with food, you may take it with a few crackers.  Please call your Imaging Department at your exam site with any questions.            Dec 26, 2017  1:30 PM CST   [facility] Lab Draw with  [employer] LAB DRAW   [facility] Lab Draw ([facility])    [address] [place] [address]   [phone]            Dec 26, 2017  2:00 PM CST   RETURN ONC with Garima Suaceda MD   [facility] ([facility])    [address] [place] [address]   [phone]            May 21, 2018  4:20 PM CDT   (Arrive by 4:05 PM)   RETURN ENDOCRINE with Avelina Castro MD   [facility] Endocrinology ([facility])    [address] [place] [address]  [address] [facility] [address]   [phone]            Jun 11, 2018  4:00 PM CDT   (Arrive by 3:45 PM)   Return Visit with Shahla Crawley MD   [facility] ([facility])    [address] [place] [address]  "[id number] [phone]              Future tests that were ordered for you     CBC with platelets differential       Last Lab Result: Hemoglobin (g/dL)       Date                     Value                 12/11/2017               9.6 (L)          ----------            Comprehensive metabolic panel                 Pending Results     Date and Time Order Name Status Description    12/8/2017 0300 CSF Culture Aerobic Bacterial Tube 2 Preliminary             Statement of Approval     Ordered          12/11/17 1015  I have reviewed and agree with all the recommendations and orders detailed in this document.  EFFECTIVE NOW     Approved and electronically signed by:  Merlene Green APRN CNP             Admission Information     Date & Time Provider Department Dept. Phone    12/7/2017 Ely Alanis MD [address] [facility] [place] [phone]      Your Vitals Were     Blood Pressure Pulse Temperature Respirations Height Weight    133/67 125 97.1  F (36.2  C) (Oral) 20 1.613 m (5' 3.5\") 88.4 kg (194 lb 14.4 oz)    Pulse Oximetry BMI (Body Mass Index)                95% 33.98 kg/m2          [employer] Information     [address] [employer] gives you secure access to your electronic health record. If you see a primary care provider, you can also send messages to your care team and make appointments. If you have questions, please call your primary care clinic.  If you do not have a primary care provider, please call [phone] and they will assist you.        Care EveryWhere ID     This is your Care EveryWhere ID. This could be used by other organizations to access your [place] medical records  [id number]        Equal Access to Services     [facility]: Hadii aad ku hadasho Soomaali, waaxda luqadaha, qaybta kaalmada adeegyada, ranjan yoder . So [facility] [phone].    ATENCIÓN: Si habla español, tiene a stiles disposición servicios gratuitos de asistencia lingüística. Llame al [phone].    We comply " with applicable federal civil rights laws and [place] laws. We do not discriminate on the basis of race, color, national origin, age, disability, sex, sexual orientation, or gender identity.               Review of your medicines      START taking        Dose / Directions    acetaminophen 325 MG tablet   Commonly known as:  TYLENOL   Used for:  High grade B-cell lymphoma (H)        Dose:  650 mg   Take 2 tablets (650 mg) by mouth every 4 hours as needed for mild pain or fever   Quantity:  100 tablet   Refills:  0       [employer] G[employer]ILDR[employer] Chew   Used for:  High grade B-cell lymphoma (H)        Dose:  5 g   Take 5 g by mouth daily (2 gummy= 5 g =1 serving)   Refills:  0       predniSONE 20 MG tablet   Commonly known as:  DELTASONE   Used for:  High grade B-cell lymphoma (H)        Dose:  30 mg/m2   Take 3 tablets (60 mg) by mouth 2 times daily for 2 doses   Quantity:  6 tablet   Refills:  0         CONTINUE these medicines which may have CHANGED, or have new prescriptions. If we are uncertain of the size of tablets/capsules you have at home, strength may be listed as something that might have changed.        Dose / Directions    calcitRIOL 0.25 MCG capsule   Commonly known as:  ROCALTROL   This may have changed:  additional instructions   Used for:  Postsurgical hypothyroidism, Papillary carcinoma, follicular variant (H), Metastasis to cervical lymph node (H)        TAKE 2 CAPSULES BY MOUTH EVERY MORNING AND TAKE 1 CAPSULE BY MOUTH EVERY NIGHT AT BEDTIME   Quantity:  270 capsule   Refills:  3       dexamethasone 4 MG tablet   Commonly known as:  DECADRON   This may have changed:  additional instructions   Used for:  High grade B-cell lymphoma (H)        Dose:  8 mg   Start taking on:  12/13/2017   Take 2 tablets (8 mg) by mouth daily for 2 days   Quantity:  4 tablet   Refills:  0       diazepam 5 MG tablet   Commonly known as:  VALIUM   This may have changed:    - when to take this  - additional  instructions   Used for:  Chest wall pain        Dose:  5 mg   Take 1 tablet (5 mg) by mouth 3 times daily as needed For muscle spasms   Quantity:  90 tablet   Refills:  2       * levothyroxine 112 MCG tablet   Commonly known as:  SYNTHROID/LEVOTHROID   This may have changed:    - how much to take  - additional instructions   Used for:  Postsurgical hypothyroidism, Papillary carcinoma, follicular variant (H), Postsurgical hypoparathyroidism (H), Thyroid cancer (H)        Mon-Sat: (2 tabs daily) Sunday: 3 tabs   Quantity:  189 tablet   Refills:  3       * levothyroxine 112 MCG tablet   Commonly known as:  SYNTHROID/LEVOTHROID   This may have changed:  You were already taking a medication with the same name, and this prescription was added. Make sure you understand how and when to take each.   Used for:  High grade B-cell lymphoma (H)        Dose:  336 mcg   Start taking on:  12/17/2017   Take 3 tablets (336 mcg) by mouth once a week   Quantity:  30 tablet   Refills:  0       methocarbamol 750 MG tablet   Commonly known as:  ROBAXIN   This may have changed:    - when to take this  - additional instructions   Used for:  Myofascial pain        Dose:  750 mg   Take 1 tablet (750 mg) by mouth 4 times daily as needed . Ok to take a 5th Robaxin on very severe days.   Quantity:  360 tablet   Refills:  1       montelukast 10 MG tablet   Commonly known as:  SINGULAIR   This may have changed:  how much to take   Used for:  Idiopathic mast cell activation syndrome (H)        Dose:  10 mg   Take 1 tablet (10 mg) by mouth 2 times daily   Quantity:  60 tablet   Refills:  0       polyethylene glycol powder   Commonly known as:  MIRALAX   This may have changed:    - when to take this  - reasons to take this   Used for:  Acute constipation        Dose:  1 capful   Take 17 g (1 capful) by mouth daily   Quantity:  510 g   Refills:  0       * Notice:  This list has 2 medication(s) that are the same as other medications prescribed for  you. Read the directions carefully, and ask your doctor or other care provider to review them with you.      CONTINUE these medicines which have NOT CHANGED        Dose / Directions    acyclovir 400 MG tablet   Commonly known as:  ZOVIRAX   Indication:  Prophylaxis of Herpes Simplex   Used for:  High grade B-cell lymphoma (H)        Dose:  400 mg   Take 1 tablet (400 mg) by mouth 2 times daily   Quantity:  60 tablet   Refills:  0       allopurinol 300 MG tablet   Commonly known as:  ZYLOPRIM   Used for:  High grade B-cell lymphoma (H)        Dose:  300 mg   Take 1 tablet (300 mg) by mouth daily   Quantity:  30 tablet   Refills:  0       aluminum chloride 20 % external solution   Commonly known as:  DRYSOL   Used for:  Rash, Intertrigo        Apply topically At Bedtime   Quantity:  60 mL   Refills:  3       AZMACORT IN        Dose:  2 puff   Inhale 2 puffs into the lungs as needed   Refills:  0       bisacodyl 5 MG EC tablet   Used for:  Constipation, unspecified constipation type        Dose:  15 mg   Take 3 tablets (15 mg) by mouth daily as needed for constipation   Quantity:  30 tablet   Refills:  0       CALCIUM CITRATE +D 315-250 MG-UNIT Tabs per tablet   Generic drug:  calcium citrate-vitamin D        Dose:  2 tablet   Take 2 tablets by mouth twice a week   Quantity:  120 tablet   Refills:  0       celecoxib 200 MG capsule   Commonly known as:  celeBREX   Used for:  Chest wall pain        TAKE ONE CAPSULE BY MOUTH TWICE DAILY   Quantity:  56 capsule   Refills:  0       cetirizine 10 MG tablet   Commonly known as:  ZYRTEC ALLERGY   Used for:  High grade B-cell lymphoma (H)        Dose:  10 mg   Take 1 tablet (10 mg) by mouth 3 times daily On hold for lab test.   Quantity:  30 tablet   Refills:  0       COMPOUND CONTAINING CONTROLLED SUBSTANCE - PHARMACY TO MIX COMPOUNDED MEDICATION   Commonly known as:  CMPD RX   Used for:  Neoplasm related pain        Apply small amount to affected area two times daily.    Quantity:  120 g   Refills:  6       cromolyn 4 % ophthalmic solution   Commonly known as:  OPTICROM   Used for:  Idiopathic mast cell activation syndrome (H)        Dose:  1 drop   Place 1 drop into both eyes 4 times daily   Quantity:  10 mL   Refills:  5       cyclobenzaprine 10 MG tablet   Commonly known as:  FLEXERIL   Used for:  High grade B-cell lymphoma (H)        Dose:  10 mg   Take 1 tablet (10 mg) by mouth 3 times daily as needed   Quantity:  30 tablet   Refills:  0       diclofenac 1 % Gel topical gel   Commonly known as:  VOLTAREN   Used for:  Myofascial pain        Apply affected area two times daily PRN using enclosed dosing card.   Quantity:  100 g   Refills:  0       EPINEPHrine 0.3 MG/0.3ML injection 2-pack   Commonly known as:  EPIPEN 2-CARIDAD        Dose:  0.3 mg   Inject 0.3 mLs (0.3 mg) into the muscle once as needed for anaphylaxis   Quantity:  0.6 mL   Refills:  3       ferrous sulfate 325 (65 FE) MG tablet   Commonly known as:  IRON   Indication:  Anemia From Inadequate Iron in the Body   Used for:  Status post bariatric surgery        Dose:  325 mg   Take 1 tablet (325 mg) by mouth 3 times daily (with meals)   Quantity:  90 tablet   Refills:  0       fluconazole 200 MG tablet   Commonly known as:  DIFLUCAN   Indication:  Fungal Infection Prophylaxis   Used for:  High grade B-cell lymphoma (H)        Dose:  200 mg   Take 1 tablet (200 mg) by mouth daily   Quantity:  30 tablet   Refills:  0       furosemide 20 MG tablet   Commonly known as:  LASIX   Used for:  Localized edema        Dose:  20 mg   Take 1 tablet (20 mg) by mouth 2 times daily   Quantity:  60 tablet   Refills:  0       hydrochlorothiazide 12.5 MG capsule   Commonly known as:  MICROZIDE   Used for:  Hypocalcemia        Dose:  12.5 mg   Take 1 capsule (12.5 mg) by mouth daily   Quantity:  90 capsule   Refills:  2       levofloxacin 250 MG tablet   Commonly known as:  LEVAQUIN   Indication:  bacterial ppx   Used for:  High grade  B-cell lymphoma (H)        Dose:  250 mg   Take 1 tablet (250 mg) by mouth daily   Quantity:  30 tablet   Refills:  0       lidocaine 5 % ointment   Commonly known as:  XYLOCAINE   Used for:  Chest wall pain        Apply quarter size amount to chest and back up to 3 times daily as needed for pain.   Quantity:  350 g   Refills:  1       lidocaine visc 2% 2.5mL/5mL & maalox/mylanta w/ simeth 2.5mL/5mL & diphenhydrAMINE 5mg/5mL Susp suspension   Commonly known as:  [facility] Mouthwash [facility]   Used for:  Stomatitis and mucositis, High grade B-cell lymphoma (H)        Dose:  10 mL   Swish and spit 10 mLs in mouth every 6 hours as needed for mouth sores   Quantity:  360 mL   Refills:  1       lidocaine-prilocaine cream   Commonly known as:  EMLA   Used for:  Neoplasm related pain, Chest wall pain        Apply topically as needed (port access pain.)   Quantity:  30 g   Refills:  1       * MS CONTIN PO        Dose:  60 mg   Take 60 mg by mouth daily Takes at 8pm   Refills:  0       * morphine 30 MG 12 hr tablet   Commonly known as:  MS CONTIN   Used for:  Neoplasm related pain        Dose:  30 mg   Take 1 tablet (30 mg) by mouth every 8 hours . Take an addition pill at night such that your evening dose is 60mg   Quantity:  120 tablet   Refills:  0       NASALCROM 5.2 MG/ACT Aers Inhaler   Used for:  Mast cell disease, systemic   Generic drug:  cromolyn sodium        SPRAY ONE SPRAY( 1 ML) IN NOSTRIL DAILY   Quantity:  1 Bottle   Refills:  6       ondansetron 8 MG ODT tab   Commonly known as:  ZOFRAN-ODT   Used for:  High grade B-cell lymphoma (H), Nausea and vomiting, intractability of vomiting not specified, unspecified vomiting type        Dose:  8 mg   Take 1 tablet (8 mg) by mouth every 8 hours as needed   Quantity:  30 tablet   Refills:  0       * order for DME   Used for:  Venous stasis        Equipment being ordered: compression stockings. 20-30 mm or 30 - 40 mm as patient can tolerate. Physical therapy to determine  if they should be above or below the knee.   Quantity:  2 Units   Refills:  4       * order for DME   Used for:  Malignant neoplasm of right female breast, unspecified site of breast        Equipment being ordered: compression bra   Quantity:  2 Device   Refills:  1       * order for DME   Used for:  Peripheral edema        Compression stockings, medium grade, please measure and fit. Please dispense a pair.   Quantity:  1 Units   Refills:  0       oxyCODONE IR 30 MG tablet   Commonly known as:  ROXICODONE   Used for:  High grade B-cell lymphoma (H)        Dose:  30 mg   Take 1 tablet (30 mg) by mouth every 4 hours as needed for moderate to severe pain   Quantity:  180 tablet   Refills:  0       potassium chloride SA 20 MEQ CR tablet   Commonly known as:  KLOR-CON   Used for:  Hypokalemia        Dose:  20 mEq   Take 1 tablet (20 mEq) by mouth daily   Quantity:  90 tablet   Refills:  3       PROBIOTIC DAILY PO   Used for:  Breast cancer, unspecified laterality, Thyroid cancer (H), Chronic arthralgias of knees and hips, unspecified laterality        Dose:  1 capsule   Take 1 capsule by mouth daily Lacto acid bifidobacterium   Refills:  0       ranitidine 75 MG tablet   Commonly known as:  ZANTAC   Used for:  Mast cell disease, systemic        Dose:  75 mg   Take 1 tablet (75 mg) by mouth 3 times daily   Quantity:  270 tablet   Refills:  3       senna-docusate 8.6-50 MG per tablet   Commonly known as:  SENOKOT-S;PERICOLACE   Used for:  Acute constipation        Dose:  1-2 tablet   Take 1-2 tablets by mouth 2 times daily as needed for constipation   Quantity:  60 tablet   Refills:  0       triamcinolone 0.025 % ointment   Commonly known as:  KENALOG        Apply topically as needed   Refills:  3       TUMS 500 MG chewable tablet   Generic drug:  calcium carbonate        Dose:  1.5 chew tab   Take 2 tablets (1,000 mg) by mouth nightly as needed for other (cramps)   Quantity:  180 chew tab   Refills:  3       * UNABLE TO  FIND        Dose:  3 tablet   3 tablets 3 times daily MEDICATION NAME: calcium D-Glucarate  3 caps contain 180mg of elemental calcium.   Refills:  0       * UNABLE TO FIND   Indication:  On hold for drug testing   Used for:  Breast cancer, unspecified laterality, Papillary carcinoma, follicular variant (H), Chronic musculoskeletal pain        Dose:  2 tablet   2 tablets 3 times daily MEDICATION NAME: Natural D-Hist (on hold during chemo)   Refills:  0       * UNABLE TO FIND        MEDICATION NAME: Tumeric 3 capsules daily (on hold during chemo)   Refills:  0       * UNABLE TO FIND        Dose:  2 capsule   Take 2 capsules by mouth 3 times daily Muscle Mag. 2 caps contain B1 20mg, B2 20mg, B6 10mg, magesium 20mg, manganese 2mg.   Refills:  0       * UNABLE TO FIND   Used for:  Thyroid cancer (H), Postsurgical hypothyroidism, Postsurgical hypoparathyroidism (H)        Dose:  2 tablet   2 tablets 3 times daily MEDICATION NAME: Digestzymes   Refills:  0       * UNABLE TO FIND   Indication:  P5P50   Used for:  Thyroid cancer (H), Postsurgical hypothyroidism, Postsurgical hypoparathyroidism (H)        Dose:  1 tablet   1 tablet daily MEDICATION NAME: Pure Encapsulations   Refills:  0       VENTOLIN  (90 BASE) MCG/ACT Inhaler   Used for:  Mild intermittent asthma without complication   Generic drug:  albuterol        INHALE 2 PUFFS INTO THE LUNGS EVERY 4 HOURS AS NEEDED FOR SHORTNESS OF BREATH OR DIFFICULT BREATHING OR WHEEZING   Quantity:  18 g   Refills:  3       vitamin D3 2000 UNITS Caps   Used for:  Thyroid cancer (H), Postsurgical hypothyroidism, Papillary carcinoma, follicular variant (H), Postsurgical hypoparathyroidism (H)        Dose:  5000 Units   Take 5,000 Units by mouth daily Takes 2 tabs daily   Quantity:  60 capsule   Refills:  4       * Notice:  This list has 11 medication(s) that are the same as other medications prescribed for you. Read the directions carefully, and ask your doctor or other care  provider to review them with you.      STOP taking     diphenhydrAMINE 50 MG capsule   Commonly known as:  BENADRYL           docusate sodium 100 MG tablet   Commonly known as:  COLACE           mometasone 110 MCG/INH inhaler   Commonly known as:  ASMANEX 30 METERED DOSES           prochlorperazine 10 MG tablet   Commonly known as:  COMPAZINE           SUMAtriptan 50 MG tablet   Commonly known as:  IMITREX                Where to get your medicines      These medications were sent to [id number] - [place], [place] - [address] [address] [place]  [address] [address] [place] [address]     Phone:  [phone]     acyclovir 400 MG tablet    allopurinol 300 MG tablet    cetirizine 10 MG tablet    cyclobenzaprine 10 MG tablet    dexamethasone 4 MG tablet    diclofenac 1 % Gel topical gel    fluconazole 200 MG tablet    levofloxacin 250 MG tablet    ondansetron 8 MG ODT tab    predniSONE 20 MG tablet         Some of these will need a paper prescription and others can be bought over the counter. Ask your nurse if you have questions.     You don't need a prescription for these medications     acetaminophen 325 MG tablet    [employer] Chew    ferrous sulfate 325 (65 FE) MG tablet    levothyroxine 112 MCG tablet               ANTIBIOTIC INSTRUCTION     You've Been Prescribed an Antibiotic - Now What?  Your healthcare team thinks that you or your loved one might have an infection. Some infections can be treated with antibiotics, which are powerful, life-saving drugs. Like all medications, antibiotics have side effects and should only be used when necessary. There are some important things you should know about your antibiotic treatment.      Your healthcare team may run tests before you start taking an antibiotic.    Your team may take samples (e.g., from your blood, urine or other areas) to run tests to look for bacteria. These test can be important to determine if you need an antibiotic at all and, if  you do, which antibiotic will work best.      Within a few days, your healthcare team might change or even stop your antibiotic.    Your team may start you on an antibiotic while they are working to find out what is making you sick.    Your team might change your antibiotic because test results show that a different antibiotic would be better to treat your infection.    In some cases, once your team has more information, they learn that you do not need an antibiotic at all. They may find out that you don't have an infection, or that the antibiotic you're taking won't work against your infection. For example, an infection caused by a virus can't be treated with antibiotics. Staying on an antibiotic when you don't need it is more likely to be harmful than helpful.      You may experience side effects from your antibiotic.    Like all medications, antibiotics have side effects. Some of these can be serious.    Let you healthcare team know if you have any known allergies when you are admitted to the hospital.    One significant side effect of nearly all antibiotics is the risk of severe and sometimes deadly diarrhea caused by Clostridium difficile (C. Difficile). This occurs when a person takes antibiotics because some good germs are destroyed. Antibiotic use allows C. diificile to take over, putting patients at high risk for this serious infection.    As a patient or caregiver, it is important to understand your or your loved one's antibiotic treatment. It is especially important for caregivers to speak up when patients can't speak for themselves. Here are some important questions to ask your healthcare team.    What infection is this antibiotic treating and how do you know I have that infection?    What side effects might occur from this antibiotic?    How long will I need to take this antibiotic?    Is it safe to take this antibiotic with other medications or supplements (e.g., vitamins) that I am taking?     Are  there any special directions I need to know about taking this antibiotic? For example, should I take it with food?    How will I be monitored to know whether my infection is responding to the antibiotic?    What tests may help to make sure the right antibiotic is prescribed for me?      Information provided by:  www.cdc.gov/getsmart  U.S. Department of Health and Human Services  Centers for disease Control and Prevention  National Center for Emerging and Zoonotic Infectious Diseases  Division of Healthcare Quality Promotion         Protect others around you: Learn how to safely use, store and throw away your medicines at www.disposemymeds.org.             Medication List: This is a list of all your medications and when to take them. Check marks below indicate your daily home schedule. Keep this list as a reference.      Medications           Morning Afternoon Evening Bedtime As Needed    acetaminophen 325 MG tablet   Commonly known as:  TYLENOL   Take 2 tablets (650 mg) by mouth every 4 hours as needed for mild pain or fever   Last time this was given:  650 mg on 12/7/2017  1:08 PM                                   acyclovir 400 MG tablet   Commonly known as:  ZOVIRAX   Take 1 tablet (400 mg) by mouth 2 times daily   Last time this was given:  400 mg on 12/11/2017  9:30 AM                                      allopurinol 300 MG tablet   Commonly known as:  ZYLOPRIM   Take 1 tablet (300 mg) by mouth daily   Last time this was given:  300 mg on 12/11/2017  9:31 AM                                   aluminum chloride 20 % external solution   Commonly known as:  DRYSOL   Apply topically At Bedtime                                   AZMACORT IN   Inhale 2 puffs into the lungs as needed                                   bisacodyl 5 MG EC tablet   Take 3 tablets (15 mg) by mouth daily as needed for constipation   Last time this was given:  15 mg on 12/10/2017  8:17 PM                                   calcitRIOL 0.25 MCG  capsule   Commonly known as:  ROCALTROL   TAKE 2 CAPSULES BY MOUTH EVERY MORNING AND TAKE 1 CAPSULE BY MOUTH EVERY NIGHT AT BEDTIME   Last time this was given:  0.5 mcg on 12/11/2017 11:59 AM                                      CALCIUM CITRATE +D 315-250 MG-UNIT Tabs per tablet   Take 2 tablets by mouth twice a week   Last time this was given:  2 tablets on 12/11/2017  9:28 AM   Generic drug:  calcium citrate-vitamin D         Gave this morning on Monday                       celecoxib 200 MG capsule   Commonly known as:  celeBREX   TAKE ONE CAPSULE BY MOUTH TWICE DAILY   Last time this was given:  200 mg on 12/11/2017  9:28 AM                                      cetirizine 10 MG tablet   Commonly known as:  ZYRTEC ALLERGY   Take 1 tablet (10 mg) by mouth 3 times daily On hold for lab test.   Last time this was given:  10 mg on 12/11/2017  9:31 AM                                         COMPOUND CONTAINING CONTROLLED SUBSTANCE - PHARMACY TO MIX COMPOUNDED MEDICATION   Commonly known as:  CMPD RX   Apply small amount to affected area two times daily.                                cromolyn 4 % ophthalmic solution   Commonly known as:  OPTICROM   Place 1 drop into both eyes 4 times daily   Last time this was given:  1 drop on 12/11/2017 12:21 PM                                            [employer] Chew   Take 5 g by mouth daily (2 gummy= 5 g =1 serving)                                cyclobenzaprine 10 MG tablet   Commonly known as:  FLEXERIL   Take 1 tablet (10 mg) by mouth 3 times daily as needed   Last time this was given:  10 mg on 12/11/2017  9:28 AM                                   dexamethasone 4 MG tablet   Commonly known as:  DECADRON   Take 2 tablets (8 mg) by mouth daily for 2 days   Start taking on:  12/13/2017                                      diazepam 5 MG tablet   Commonly known as:  VALIUM   Take 1 tablet (5 mg) by mouth 3 times daily as needed For muscle spasms   Last time  this was given:  5 mg on 12/11/2017  9:27 AM                                   diclofenac 1 % Gel topical gel   Commonly known as:  VOLTAREN   Apply affected area two times daily PRN using enclosed dosing card.   Last time this was given:  4 g on 12/11/2017 12:21 PM                                   EPINEPHrine 0.3 MG/0.3ML injection 2-pack   Commonly known as:  EPIPEN 2-CARIDAD   Inject 0.3 mLs (0.3 mg) into the muscle once as needed for anaphylaxis                                ferrous sulfate 325 (65 FE) MG tablet   Commonly known as:  IRON   Take 1 tablet (325 mg) by mouth 3 times daily (with meals)   Last time this was given:  325 mg on 12/11/2017 11:59 AM                                         fluconazole 200 MG tablet   Commonly known as:  DIFLUCAN   Take 1 tablet (200 mg) by mouth daily                                   furosemide 20 MG tablet   Commonly known as:  LASIX   Take 1 tablet (20 mg) by mouth 2 times daily   Last time this was given:  20 mg on 12/11/2017  9:28 AM                                      hydrochlorothiazide 12.5 MG capsule   Commonly known as:  MICROZIDE   Take 1 capsule (12.5 mg) by mouth daily   Last time this was given:  12.5 mg on 12/11/2017  9:27 AM                                   levofloxacin 250 MG tablet   Commonly known as:  LEVAQUIN   Take 1 tablet (250 mg) by mouth daily                                   * levothyroxine 112 MCG tablet   Commonly known as:  SYNTHROID/LEVOTHROID   Mon-Sat: (2 tabs daily) Sunday: 3 tabs   Last time this was given:  224 mcg on 12/11/2017  6:30 AM                                      * levothyroxine 112 MCG tablet   Commonly known as:  SYNTHROID/LEVOTHROID   Take 3 tablets (336 mcg) by mouth once a week   Start taking on:  12/17/2017   Last time this was given:  224 mcg on 12/11/2017  6:30 AM            Given this am                       lidocaine 5 % ointment   Commonly known as:  XYLOCAINE   Apply quarter size amount to chest and back up to 3  times daily as needed for pain.   Last time this was given:  12/10/2017  8:03 PM                                   lidocaine visc 2% 2.5mL/5mL & maalox/mylanta w/ simeth 2.5mL/5mL & diphenhydrAMINE 5mg/5mL Susp suspension   Commonly known as:  MAGIC Mouthwash [facility]   Swish and spit 10 mLs in mouth every 6 hours as needed for mouth sores                                   lidocaine-prilocaine cream   Commonly known as:  EMLA   Apply topically as needed (port access pain.)                                methocarbamol 750 MG tablet   Commonly known as:  ROBAXIN   Take 1 tablet (750 mg) by mouth 4 times daily as needed . Ok to take a 5th Robaxin on very severe days.   Last time this was given:  750 mg on 12/11/2017 12:00 PM                                   montelukast 10 MG tablet   Commonly known as:  SINGULAIR   Take 1 tablet (10 mg) by mouth 2 times daily   Last time this was given:  20 mg on 12/11/2017  9:30 AM                                      * MS CONTIN PO   Take 60 mg by mouth daily Takes at 8pm   Last time this was given:  30 mg on 12/11/2017 11:59 AM                                   * morphine 30 MG 12 hr tablet   Commonly known as:  MS CONTIN   Take 1 tablet (30 mg) by mouth every 8 hours . Take an addition pill at night such that your evening dose is 60mg   Last time this was given:  30 mg on 12/11/2017 11:59 AM                                   NASALCROM 5.2 MG/ACT Aers Inhaler   SPRAY ONE SPRAY( 1 ML) IN NOSTRIL DAILY   Generic drug:  cromolyn sodium                                   ondansetron 8 MG ODT tab   Commonly known as:  ZOFRAN-ODT   Take 1 tablet (8 mg) by mouth every 8 hours as needed                                   * order for DME   Equipment being ordered: compression stockings. 20-30 mm or 30 - 40 mm as patient can tolerate. Physical therapy to determine if they should be above or below the knee.                                * order for DME   Equipment being ordered: compression  bra                                * order for DME   Compression stockings, medium grade, please measure and fit. Please dispense a pair.                                oxyCODONE IR 30 MG tablet   Commonly known as:  ROXICODONE   Take 1 tablet (30 mg) by mouth every 4 hours as needed for moderate to severe pain   Last time this was given:  30 mg on 12/11/2017  9:50 AM                                   polyethylene glycol powder   Commonly known as:  MIRALAX   Take 17 g (1 capful) by mouth daily                                   potassium chloride SA 20 MEQ CR tablet   Commonly known as:  KLOR-CON   Take 1 tablet (20 mEq) by mouth daily   Last time this was given:  40 mEq on 12/11/2017 12:00 PM                                   predniSONE 20 MG tablet   Commonly known as:  DELTASONE   Take 3 tablets (60 mg) by mouth 2 times daily for 2 doses   Last time this was given:  60 mg on 12/11/2017  9:30 AM                                      PROBIOTIC DAILY PO   Take 1 capsule by mouth daily Lacto acid bifidobacterium                                   ranitidine 75 MG tablet   Commonly known as:  ZANTAC   Take 1 tablet (75 mg) by mouth 3 times daily   Last time this was given:  75 mg on 12/11/2017  9:28 AM                                         senna-docusate 8.6-50 MG per tablet   Commonly known as:  SENOKOT-S;PERICOLACE   Take 1-2 tablets by mouth 2 times daily as needed for constipation   Last time this was given:  1 tablet on 12/8/2017  8:39 PM                                   triamcinolone 0.025 % ointment   Commonly known as:  KENALOG   Apply topically as needed                                   TUMS 500 MG chewable tablet   Take 2 tablets (1,000 mg) by mouth nightly as needed for other (cramps)   Generic drug:  calcium carbonate                                   * UNABLE TO FIND   3 tablets 3 times daily MEDICATION NAME: calcium D-Glucarate  3 caps contain 180mg of elemental calcium.   Last time this was given:   12/11/2017  9:31 AM                                * UNABLE TO FIND   2 tablets 3 times daily MEDICATION NAME: Natural D-Hist (on hold during chemo)   Last time this was given:  12/11/2017  9:31 AM                                * UNABLE TO FIND   MEDICATION NAME: Tumeric 3 capsules daily (on hold during chemo)   Last time this was given:  12/11/2017  9:31 AM                                * UNABLE TO FIND   Take 2 capsules by mouth 3 times daily Muscle Mag. 2 caps contain B1 20mg, B2 20mg, B6 10mg, magesium 20mg, manganese 2mg.   Last time this was given:  12/11/2017  9:31 AM                                * UNABLE TO FIND   2 tablets 3 times daily MEDICATION NAME: Digestzymes   Last time this was given:  12/11/2017  9:31 AM                                * UNABLE TO FIND   1 tablet daily MEDICATION NAME: Pure Encapsulations   Last time this was given:  12/11/2017  9:31 AM                                VENTOLIN  (90 BASE) MCG/ACT Inhaler   INHALE 2 PUFFS INTO THE LUNGS EVERY 4 HOURS AS NEEDED FOR SHORTNESS OF BREATH OR DIFFICULT BREATHING OR WHEEZING   Generic drug:  albuterol                                vitamin D3 2000 UNITS Caps   Take 5,000 Units by mouth daily Takes 2 tabs daily                                      * Notice:  This list has 13 medication(s) that are the same as other medications prescribed for you. Read the directions carefully, and ask your doctor or other care provider to review them with you.

## 2017-12-07 NOTE — PROGRESS NOTES
"CLINICAL NUTRITION SERVICES - ASSESSMENT NOTE     Nutrition Prescription    RECOMMENDATIONS FOR MDs/PROVIDERS TO ORDER:  --Resume home vitamin and mineral regimen given hx of rachael-en-y gastric bypass: cholecalciferol (vit D3) 5000 units 2 tabs daily, probiotic  --Also recommend adding the following vitamins per post-bariatric surgery supplementation protocol: Multivitamin once daily + vitamin B12 350 mcg/day  --Recommend checking vitamin B12 labs (last checked 5/21/2014) - pt may need additional supplementation if low.    Malnutrition Status:    Patient does not meet two of the above criteria necessary for diagnosing malnutrition but is at risk for malnutrition.    Recommendations already ordered by Registered Dietitian (RD):  None at this time.    Future/Additional Recommendations:  Monitor PO intakes. If pt consistently eating <50% of meals, order calorie counts. Encourage pt to drink supplements.      REASON FOR ASSESSMENT  Tisha Arias is a/an 57 year old female assessed by the dietitian for Provider Order - \"2-MD Order - specify reason in comments Comment: Consult.\" Per discussion with APRN who ordered consult, pt mentioned she wants to speak with RD regarding bypass surgery and suspects nutrient deficiencies.     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    NUTRITION HISTORY  During visit, pt says she has off and on nausea at home. Some days she is able to eat normal at baseline (2 meals + snacks) and some days she has no appetite and will only drink V8 tomato juice all day. She had questions about eating raw fruits and vegetables and whether this was safe on chemotherapy. Advised pt that eating raw fruits and vegetables is safe if they are washed well before eating. Also discussed other food safety recommendations. Assessment was cut short due to pt " "had to leave for procedure.     CURRENT NUTRITION ORDERS  Diet: Regular + supplements with meals  Intake/Tolerance: Pt had tray set up with bites of hamburger gone - pt said she got nauseous and could not eat any more.     LABS  Labs reviewed    MEDICATIONS  Bowel regimen  calcium citrate - vitamin D    ferrous sulfate (iron) 325 mg 1 tab 3x daily    ANTHROPOMETRICS  Height: 161.3 cm (5' 3.5\")  Most Recent Weight: 85.5 kg (188 lb 6.4 oz)    IBW: 54.5 kg  BMI: Obesity Grade I BMI 30-34.9  Weight History:   Wt Readings from Last 15 Encounters:   12/07/17 85.5 kg (188 lb 6.4 oz)   12/07/17 86.3 kg (190 lb 4.8 oz)   11/28/17 84.3 kg (185 lb 12.8 oz)   11/28/17 84.3 kg (185 lb 12.8 oz)   11/27/17 83 kg (182 lb 14.4 oz)   11/22/17 83.8 kg (184 lb 11.2 oz)   11/20/17 85.2 kg (187 lb 12.8 oz)   11/16/17 85.1 kg (187 lb 11.2 oz)   11/13/17 85.3 kg (188 lb)   11/10/17 84.8 kg (187 lb)   11/06/17 83.3 kg (183 lb 9.6 oz)   11/03/17 82.6 kg (182 lb)   10/31/17 85 kg (187 lb 8 oz)   10/27/17 85.1 kg (187 lb 9.8 oz)   10/23/17 85.9 kg (189 lb 4.8 oz)     Dosing Weight: 62 kg (adjusted) - based on lowest documented wt so far this adm (85.5 kg on 12/7) and IBW of 54.5 kg.    ASSESSED NUTRITION NEEDS  Estimated Energy Needs: 6245-2548 kcals/day (25 - 30 kcals/kg)  Justification: Maintenance  Estimated Protein Needs: 74-93 grams protein/day (1.2 - 1.5 grams of pro/kg)  Justification: Maintenance and Obesity guidelines  Estimated Fluid Needs: (1 mL/kcal)   Justification: Per provider pending fluid status    PHYSICAL FINDINGS  See malnutrition section below.    MALNUTRITION  % Intake: Decreased intake does not meet criteria  % Weight Loss: None noted  Subcutaneous Fat Loss: None observed  Muscle Loss: None observed  Fluid Accumulation/Edema: None noted  Malnutrition Diagnosis: Patient does not meet two of the above criteria necessary for diagnosing malnutrition but is at risk for malnutrition.    NUTRITION DIAGNOSIS  Predicted " inadequate oral intake related to nausea r/t chemotherapy and at risk for PO to falter d/t potential for appetite to decline with reintroduction of chemo.     INTERVENTIONS  Implementation  1. Nutrition Education: Answered all of patient's questions regarding consumption of raw fruits and vegetables and encouraged pt to eat a good source of protein at each meal and snack. Also encouraged pt to try supplements with meals. Provided the following handouts: Supplement List, Coping with Nausea and Vomiting, and Tips to Increase Protein in Diet.  2. Multivitamin/mineral supplement therapy - see recs above    Goals  Patient to consume % of nutritionally adequate meal trays TID, or the equivalent with supplements/snacks.     Monitoring/Evaluation  Progress toward goals will be monitored and evaluated per protocol.    Gricelda Kohler RD, LD  Pager [phone]

## 2017-12-07 NOTE — PROGRESS NOTES
Pt asked for clarification of whether or not her chemo doses would be increased with this cycle as she was leaving to head to [facility] for admission today.   Per Jessica Cox PA-C : Yes, and orders have been adjusted and inpt team is aware. Clarified that this is based on toro of previous cycle, not today's counts.   Phone call to pt: unable to notify pt of this information yet, as per her request due to no answer and her cell VMbox is full. CASSIDY Guan  HPI      ROS      Physical Exam

## 2017-12-07 NOTE — PLAN OF CARE
Problem: Chemotherapy Effects (Adult)  Goal: Signs and Symptoms of Listed Potential Problems Will be Absent, Minimized or Managed (Chemotherapy Effects)  Signs and symptoms of listed potential problems will be absent, minimized or managed by discharge/transition of care (reference Chemotherapy Effects (Adult) CPG).   Outcome: No Change  Admitted for chemotherapy for DLDCL. Rituxan hung per rapid infusion after pre medications given. Oxycodone for chest discomfort with relief. Up independently. Compazine for nausea with relief.

## 2017-12-07 NOTE — MR AVS SNAPSHOT
After Visit Summary   12/7/2017    Tisha Arias    MRN: [id number]           Patient Information     Date Of Birth          1960        Visit Information        Provider Department      12/7/2017 7:00 AM Jessica Cox PA-C [facility]        Today's Diagnoses     High grade B-cell lymphoma (H)    -  1    Leg swelling        Weight gain           Follow-ups after your visit        Your next 10 appointments already scheduled     May 21, 2018  4:20 PM CDT   (Arrive by 4:05 PM)   RETURN ENDOCRINE with Avelina Castro MD   [facility] ([facility])    [address] [place] [address]  3rd [facility] [id number] [phone]            Jun 04, 2018  4:30 PM CDT   (Arrive by 4:15 PM)   Return Visit with Shahla Crawley MD   [facility] ([facility])    [address] [place] [id number] [phone]              Who to contact     If you have questions or need follow up information about today's clinic visit or your schedule please contact [facility] directly at [phone].  Normal or non-critical lab and imaging results will be communicated to you by MyChart, letter or phone within 4 business days after the clinic has received the results. If you do not hear from us within 7 days, please contact the clinic through MyChart or phone. If you have a critical or abnormal lab result, we will notify you by phone as soon as possible.  Submit refill requests through [employer] or call your pharmacy and they will forward the refill request to us. Please allow 3 business days for your refill to be completed.          Additional Information About Your Visit        [email or web address] Information     [employer] gives you secure access to your electronic health record. If you see a primary care provider, you can also send messages to your care team and make appointments.  "If you have questions, please call your primary care clinic.  If you do not have a primary care provider, please call [phone] and they will assist you.        Care EveryWhere ID     This is your Care EveryWhere ID. This could be used by other organizations to access your [place] medical records  [id number]        Your Vitals Were     Pulse Temperature Height Pulse Oximetry BMI (Body Mass Index)       130 97.7  F (36.5  C) (Oral) 1.605 m (5' 3.19\") 100% 33.51 kg/m2        Blood Pressure from Last 3 Encounters:   12/07/17 121/73   12/07/17 118/75   11/28/17 122/71    Weight from Last 3 Encounters:   12/07/17 85.5 kg (188 lb 6.4 oz)   12/07/17 86.3 kg (190 lb 4.8 oz)   11/28/17 84.3 kg (185 lb 12.8 oz)                 Today's Medication Changes          These changes are accurate as of: 12/7/17 10:25 AM.  If you have any questions, ask your nurse or doctor.               These medicines have changed or have updated prescriptions.        Dose/Directions    calcitRIOL 0.25 MCG capsule   Commonly known as:  ROCALTROL   This may have changed:  additional instructions   Used for:  Postsurgical hypothyroidism, Papillary carcinoma, follicular variant (H), Metastasis to cervical lymph node (H)        TAKE 2 CAPSULES BY MOUTH EVERY MORNING AND TAKE 1 CAPSULE BY MOUTH EVERY NIGHT AT BEDTIME   Quantity:  270 capsule   Refills:  3       diazepam 5 MG tablet   Commonly known as:  VALIUM   This may have changed:    - when to take this  - additional instructions   Used for:  Chest wall pain        Dose:  5 mg   Take 1 tablet (5 mg) by mouth 3 times daily as needed For muscle spasms   Quantity:  90 tablet   Refills:  2       levothyroxine 112 MCG tablet   Commonly known as:  SYNTHROID/LEVOTHROID   This may have changed:    - how much to take  - additional instructions   Used for:  Postsurgical hypothyroidism, Papillary carcinoma, follicular variant (H), Postsurgical hypoparathyroidism (H), Thyroid cancer (H)        Mon-Sat: (2 " tabs daily) Osvaldo: 3 tabs   Quantity:  189 tablet   Refills:  3       methocarbamol 750 MG tablet   Commonly known as:  ROBAXIN   This may have changed:    - when to take this  - additional instructions   Used for:  Myofascial pain        Dose:  750 mg   Take 1 tablet (750 mg) by mouth 4 times daily as needed . Ok to take a 5th Robaxin on very severe days.   Quantity:  360 tablet   Refills:  1       montelukast 10 MG tablet   Commonly known as:  SINGULAIR   This may have changed:  how much to take   Used for:  Idiopathic mast cell activation syndrome (H)        Dose:  10 mg   Take 1 tablet (10 mg) by mouth 2 times daily   Quantity:  60 tablet   Refills:  0       polyethylene glycol powder   Commonly known as:  MIRALAX   This may have changed:    - when to take this  - reasons to take this   Used for:  Acute constipation        Dose:  1 capful   Take 17 g (1 capful) by mouth daily   Quantity:  510 g   Refills:  0                Primary Care Provider Office Phone # Fax #    Shahla Raul Crawley [phone]       [address] [place] [id number]        Equal Access to Services     [facility]: Hadii sacha johnsono Soesteban, waaxda luqadaha, qaybta kaalmada daina, ranjan yoder . So [facility] [place] [phone].    ATENCIÓN: Si habla español, tiene a stiles disposición servicios gratuitos de asistencia lingüística. [facility] [phone].    We comply with applicable federal civil rights laws and [place] laws. We do not discriminate on the basis of race, color, national origin, age, disability, sex, sexual orientation, or gender identity.            Thank you!     Thank you for choosing [facility]  for your care. Our goal is always to provide you with excellent care. Hearing back from our patients is one way we can continue to improve our services. Please take a few minutes to complete the written survey that you may receive in the mail after your visit  with us. Thank you!             Your Updated Medication List - Protect others around you: Learn how to safely use, store and throw away your medicines at www.disposemymeds.org.          This list is accurate as of: 12/7/17 10:25 AM.  Always use your most recent med list.                   Brand Name Dispense Instructions for use Diagnosis    acyclovir 400 MG tablet    ZOVIRAX    60 tablet    Take 1 tablet (400 mg) by mouth 2 times daily    High grade B-cell lymphoma (H)       allopurinol 300 MG tablet    ZYLOPRIM     Take 300 mg by mouth daily        aluminum chloride 20 % external solution    DRYSOL    60 mL    Apply topically At Bedtime    Rash, Intertrigo       AZMACORT IN      Inhale 2 puffs into the lungs as needed        bisacodyl 5 MG EC tablet     30 tablet    Take 3 tablets (15 mg) by mouth daily as needed for constipation    Constipation, unspecified constipation type       calcitRIOL 0.25 MCG capsule    ROCALTROL    270 capsule    TAKE 2 CAPSULES BY MOUTH EVERY MORNING AND TAKE 1 CAPSULE BY MOUTH EVERY NIGHT AT BEDTIME    Postsurgical hypothyroidism, Papillary carcinoma, follicular variant (H), Metastasis to cervical lymph node (H)       CALCIUM CITRATE +D 315-250 MG-UNIT Tabs per tablet   Generic drug:  calcium citrate-vitamin D     120 tablet    Take 2 tablets by mouth twice a week        celecoxib 200 MG capsule    celeBREX    56 capsule    TAKE ONE CAPSULE BY MOUTH TWICE DAILY    Chest wall pain       cetirizine 10 MG tablet    ZYRTEC ALLERGY    30 tablet    Take 1 tablet (10 mg) by mouth 3 times daily On hold for lab test.    High grade B-cell lymphoma (H)       COMPOUND CONTAINING CONTROLLED SUBSTANCE - PHARMACY TO MIX COMPOUNDED MEDICATION    CMPD RX    120 g    Apply small amount to affected area two times daily.    Neoplasm related pain       cromolyn 4 % ophthalmic solution    OPTICROM    10 mL    Place 1 drop into both eyes 4 times daily    Idiopathic mast cell activation syndrome (H)        cyclobenzaprine 10 MG tablet    FLEXERIL    90 tablet    Take 1 tablet (10 mg) by mouth 3 times daily as needed    High grade B-cell lymphoma (H)       dexamethasone 4 MG tablet    DECADRON    4 tablet    Take 2 tablets (8 mg) by mouth daily for 2 days On Wed 11/22 and Thursday 11/23    High grade B-cell lymphoma (H)       diazepam 5 MG tablet    VALIUM    90 tablet    Take 1 tablet (5 mg) by mouth 3 times daily as needed For muscle spasms    Chest wall pain       diclofenac 1 % Gel topical gel    VOLTAREN    100 g    Apply affected area two times daily PRN using enclosed dosing card.    Myofascial pain       diphenhydrAMINE 50 MG capsule    BENADRYL    56 capsule    Take 1 capsule (50 mg) by mouth nightly as needed for itching or sleep    High grade B-cell lymphoma (H)       docusate sodium 100 MG tablet    COLACE    30 tablet    Take 100 mg by mouth daily    Acute constipation       EPINEPHrine 0.3 MG/0.3ML injection 2-pack    EPIPEN 2-CARIDAD    0.6 mL    Inject 0.3 mLs (0.3 mg) into the muscle once as needed for anaphylaxis        ferrous sulfate 325 (65 FE) MG tablet    IRON    90 tablet    Take 1 tablet (325 mg) by mouth 3 times daily (with meals)    Status post bariatric surgery       fluconazole 200 MG tablet    DIFLUCAN    30 tablet    Take 1 tablet (200 mg) by mouth daily    High grade B-cell lymphoma (H)       furosemide 20 MG tablet    LASIX    60 tablet    Take 1 tablet (20 mg) by mouth 2 times daily    Localized edema       hydrochlorothiazide 12.5 MG capsule    MICROZIDE    90 capsule    Take 1 capsule (12.5 mg) by mouth daily    Hypocalcemia       levofloxacin 250 MG tablet    LEVAQUIN    30 tablet    Take 1 tablet (250 mg) by mouth daily    High grade B-cell lymphoma (H)       levothyroxine 112 MCG tablet    SYNTHROID/LEVOTHROID    189 tablet    Mon-Sat: (2 tabs daily) Sunday: 3 tabs    Postsurgical hypothyroidism, Papillary carcinoma, follicular variant (H), Postsurgical hypoparathyroidism (H), Thyroid  cancer (H)       lidocaine 5 % ointment    XYLOCAINE    350 g    Apply quarter size amount to chest and back up to 3 times daily as needed for pain.    Chest wall pain       lidocaine visc 2% 2.5mL/5mL & maalox/mylanta w/ simeth 2.5mL/5mL & diphenhydrAMINE 5mg/5mL Susp suspension    [facility]    360 mL    Swish and spit 10 mLs in mouth every 6 hours as needed for mouth sores    Stomatitis and mucositis, High grade B-cell lymphoma (H)       lidocaine-prilocaine cream    EMLA    30 g    Apply topically as needed (port access pain.)    Neoplasm related pain, Chest wall pain       methocarbamol 750 MG tablet    ROBAXIN    360 tablet    Take 1 tablet (750 mg) by mouth 4 times daily as needed . Ok to take a 5th Robaxin on very severe days.    Myofascial pain       mometasone 110 MCG/INH inhaler    ASMANEX 30 METERED DOSES    3 Inhaler    Inhale 1 puff into the lungs daily    Intermittent asthma, uncomplicated       montelukast 10 MG tablet    SINGULAIR    60 tablet    Take 1 tablet (10 mg) by mouth 2 times daily    Idiopathic mast cell activation syndrome (H)       * MS CONTIN PO      Take 60 mg by mouth daily Takes at 8pm        * morphine 30 MG 12 hr tablet    MS CONTIN    120 tablet    Take 1 tablet (30 mg) by mouth every 8 hours . Take an addition pill at night such that your evening dose is 60mg    Neoplasm related pain       NASALCROM 5.2 MG/ACT Aers Inhaler   Generic drug:  cromolyn sodium     1 Bottle    SPRAY ONE SPRAY( 1 ML) IN NOSTRIL DAILY    Mast cell disease, systemic       ondansetron 8 MG ODT tab    ZOFRAN-ODT    30 tablet    Take 1 tablet (8 mg) by mouth every 8 hours as needed    High grade B-cell lymphoma (H), Nausea and vomiting, intractability of vomiting not specified, unspecified vomiting type       * order for DME     2 Units    Equipment being ordered: compression stockings. 20-30 mm or 30 - 40 mm as patient can tolerate. Physical therapy to determine if they should be above or  below the knee.    Venous stasis       * order for DME     2 Device    Equipment being ordered: compression bra    Malignant neoplasm of right female breast, unspecified site of breast       * order for DME     1 Units    Compression stockings, medium grade, please measure and fit. Please dispense a pair.    Peripheral edema       oxyCODONE IR 30 MG tablet    ROXICODONE    180 tablet    Take 1 tablet (30 mg) by mouth every 4 hours as needed for moderate to severe pain    High grade B-cell lymphoma (H)       polyethylene glycol powder    MIRALAX    510 g    Take 17 g (1 capful) by mouth daily    Acute constipation       potassium chloride SA 20 MEQ CR tablet    KLOR-CON    90 tablet    Take 1 tablet (20 mEq) by mouth daily    Hypokalemia       PROBIOTIC DAILY PO      Take 1 capsule by mouth daily Lacto acid bifidobacterium    Breast cancer, unspecified laterality, Thyroid cancer (H), Chronic arthralgias of knees and hips, unspecified laterality       prochlorperazine 10 MG tablet    COMPAZINE    60 tablet    Take 1 tablet (10 mg) by mouth every 6 hours as needed for nausea or vomiting    High grade B-cell lymphoma (H), Nausea       ranitidine 75 MG tablet    ZANTAC    270 tablet    Take 1 tablet (75 mg) by mouth 3 times daily    Mast cell disease, systemic       senna-docusate 8.6-50 MG per tablet    SENOKOT-S;PERICOLACE    60 tablet    Take 1-2 tablets by mouth 2 times daily as needed for constipation    Acute constipation       SUMAtriptan 50 MG tablet    IMITREX    9 tablet    Take 1 tablet (50 mg) by mouth at onset of headache for migraine (may repeat in 2 hours as needed, max 2 tablets daily)    Migraine without status migrainosus, not intractable, unspecified migraine type       triamcinolone 0.025 % ointment    KENALOG     Apply topically as needed        TUMS 500 MG chewable tablet   Generic drug:  calcium carbonate     180 chew tab    Take 2 tablets (1,000 mg) by mouth nightly as needed for other (cramps)         * UNABLE TO FIND      3 tablets 3 times daily MEDICATION NAME: calcium D-Glucarate  3 caps contain 180mg of elemental calcium.        * UNABLE TO FIND      2 tablets 3 times daily MEDICATION NAME: Natural D-Hist (on hold during chemo)    Breast cancer, unspecified laterality, Papillary carcinoma, follicular variant (H), Chronic musculoskeletal pain       * UNABLE TO FIND      MEDICATION NAME: Tumeric 3 capsules daily (on hold during chemo)        * UNABLE TO FIND      Take 2 capsules by mouth 3 times daily Muscle Mag. 2 caps contain B1 20mg, B2 20mg, B6 10mg, magesium 20mg, manganese 2mg.        * UNABLE TO FIND      2 tablets 3 times daily MEDICATION NAME: Digestzymes    Thyroid cancer (H), Postsurgical hypothyroidism, Postsurgical hypoparathyroidism (H)       * UNABLE TO FIND      1 tablet daily MEDICATION NAME: Pure Encapsulations    Thyroid cancer (H), Postsurgical hypothyroidism, Postsurgical hypoparathyroidism (H)       VENTOLIN  (90 BASE) MCG/ACT Inhaler   Generic drug:  albuterol     18 g    INHALE 2 PUFFS INTO THE LUNGS EVERY 4 HOURS AS NEEDED FOR SHORTNESS OF BREATH OR DIFFICULT BREATHING OR WHEEZING    Mild intermittent asthma without complication       vitamin D3 2000 UNITS Caps     60 capsule    Take 5,000 Units by mouth daily Takes 2 tabs daily    Thyroid cancer (H), Postsurgical hypothyroidism, Papillary carcinoma, follicular variant (H), Postsurgical hypoparathyroidism (H)       * Notice:  This list has 11 medication(s) that are the same as other medications prescribed for you. Read the directions carefully, and ask your doctor or other care provider to review them with you.

## 2017-12-08 ENCOUNTER — APPOINTMENT (OUTPATIENT)
Dept: GENERAL RADIOLOGY | Facility: CLINIC | Age: 57
End: 2017-12-08
Attending: NURSE PRACTITIONER
Payer: COMMERCIAL

## 2017-12-08 ENCOUNTER — APPOINTMENT (OUTPATIENT)
Dept: OCCUPATIONAL THERAPY | Facility: CLINIC | Age: 57
End: 2017-12-08
Attending: NURSE PRACTITIONER
Payer: COMMERCIAL

## 2017-12-08 LAB
ABO + RH BLD: NORMAL
ABO + RH BLD: NORMAL
ALBUMIN SERPL-MCNC: 2.5 G/DL (ref 3.4–5)
ALP SERPL-CCNC: 69 U/L (ref 40–150)
ALT SERPL W P-5'-P-CCNC: 24 U/L (ref 0–50)
ANION GAP SERPL CALCULATED.3IONS-SCNC: 10 MMOL/L (ref 3–14)
APPEARANCE CSF: CLEAR
AST SERPL W P-5'-P-CCNC: 20 U/L (ref 0–45)
BASOPHILS # BLD AUTO: 0 10E9/L (ref 0–0.2)
BASOPHILS NFR BLD AUTO: 0.3 %
BILIRUB SERPL-MCNC: 0.5 MG/DL (ref 0.2–1.3)
BLD GP AB SCN SERPL QL: NORMAL
BLD PROD TYP BPU: NORMAL
BLD UNIT ID BPU: 0
BLD UNIT ID BPU: 0
BLOOD BANK CMNT PATIENT-IMP: NORMAL
BLOOD PRODUCT CODE: NORMAL
BLOOD PRODUCT CODE: NORMAL
BPU ID: NORMAL
BPU ID: NORMAL
BUN SERPL-MCNC: 16 MG/DL (ref 7–30)
CALCIUM SERPL-MCNC: 8.2 MG/DL (ref 8.5–10.1)
CHLORIDE SERPL-SCNC: 105 MMOL/L (ref 94–109)
CO2 SERPL-SCNC: 26 MMOL/L (ref 20–32)
COLOR CSF: COLORLESS
CREAT SERPL-MCNC: 0.84 MG/DL (ref 0.52–1.04)
DIFFERENTIAL METHOD BLD: ABNORMAL
EOSINOPHIL # BLD AUTO: 0 10E9/L (ref 0–0.7)
EOSINOPHIL NFR BLD AUTO: 0 %
ERYTHROCYTE [DISTWIDTH] IN BLOOD BY AUTOMATED COUNT: 26 % (ref 10–15)
GFR SERPL CREATININE-BSD FRML MDRD: 70 ML/MIN/1.7M2
GLUCOSE CSF-MCNC: 73 MG/DL (ref 40–70)
GLUCOSE SERPL-MCNC: 141 MG/DL (ref 70–99)
GRAM STN SPEC: NORMAL
GRAM STN SPEC: NORMAL
HCT VFR BLD AUTO: 26.5 % (ref 35–47)
HGB BLD-MCNC: 8 G/DL (ref 11.7–15.7)
IMM GRANULOCYTES # BLD: 0.1 10E9/L (ref 0–0.4)
IMM GRANULOCYTES NFR BLD: 0.8 %
LYMPHOCYTES # BLD AUTO: 0.3 10E9/L (ref 0.8–5.3)
LYMPHOCYTES NFR BLD AUTO: 4.2 %
MCH RBC QN AUTO: 28.7 PG (ref 26.5–33)
MCHC RBC AUTO-ENTMCNC: 30.2 G/DL (ref 31.5–36.5)
MCV RBC AUTO: 95 FL (ref 78–100)
MONOCYTES # BLD AUTO: 0.4 10E9/L (ref 0–1.3)
MONOCYTES NFR BLD AUTO: 5.7 %
NEUTROPHILS # BLD AUTO: 6.6 10E9/L (ref 1.6–8.3)
NEUTROPHILS NFR BLD AUTO: 89 %
NRBC # BLD AUTO: 0 10*3/UL
NRBC BLD AUTO-RTO: 0 /100
NUM BPU REQUESTED: 2
PLATELET # BLD AUTO: 290 10E9/L (ref 150–450)
POTASSIUM SERPL-SCNC: 3.6 MMOL/L (ref 3.4–5.3)
PROT CSF-MCNC: 68 MG/DL (ref 15–60)
PROT SERPL-MCNC: 5.4 G/DL (ref 6.8–8.8)
RBC # BLD AUTO: 2.79 10E12/L (ref 3.8–5.2)
RBC # CSF MANUAL: 0 /UL (ref 0–2)
SODIUM SERPL-SCNC: 142 MMOL/L (ref 133–144)
SPECIMEN EXP DATE BLD: NORMAL
SPECIMEN SOURCE: NORMAL
TRANSFUSION STATUS PATIENT QL: NORMAL
TUBE # CSF: 4 #
WBC # BLD AUTO: 7.4 10E9/L (ref 4–11)
WBC # CSF MANUAL: 0 /UL (ref 0–5)

## 2017-12-08 PROCEDURE — 85025 COMPLETE CBC W/AUTO DIFF WBC: CPT | Performed by: NURSE PRACTITIONER

## 2017-12-08 PROCEDURE — 25000128 H RX IP 250 OP 636: Performed by: INTERNAL MEDICINE

## 2017-12-08 PROCEDURE — 009U3ZX DRAINAGE OF SPINAL CANAL, PERCUTANEOUS APPROACH, DIAGNOSTIC: ICD-10-PCS | Performed by: NURSE PRACTITIONER

## 2017-12-08 PROCEDURE — 40000133 ZZH STATISTIC OT WARD VISIT: Performed by: OCCUPATIONAL THERAPIST

## 2017-12-08 PROCEDURE — 87070 CULTURE OTHR SPECIMN AEROBIC: CPT | Performed by: NURSE PRACTITIONER

## 2017-12-08 PROCEDURE — S0169 CALCITROL: HCPCS | Performed by: NURSE PRACTITIONER

## 2017-12-08 PROCEDURE — 3E0R305 INTRODUCTION OF OTHER ANTINEOPLASTIC INTO SPINAL CANAL, PERCUTANEOUS APPROACH: ICD-10-PCS | Performed by: RADIOLOGY

## 2017-12-08 PROCEDURE — 97140 MANUAL THERAPY 1/> REGIONS: CPT | Mod: GO | Performed by: OCCUPATIONAL THERAPIST

## 2017-12-08 PROCEDURE — 40000556 ZZH STATISTIC PERIPHERAL IV START W US GUIDANCE

## 2017-12-08 PROCEDURE — 80053 COMPREHEN METABOLIC PANEL: CPT | Performed by: NURSE PRACTITIONER

## 2017-12-08 PROCEDURE — 99232 SBSQ HOSP IP/OBS MODERATE 35: CPT | Performed by: INTERNAL MEDICINE

## 2017-12-08 PROCEDURE — 25000132 ZZH RX MED GY IP 250 OP 250 PS 637: Performed by: NURSE PRACTITIONER

## 2017-12-08 PROCEDURE — 25000131 ZZH RX MED GY IP 250 OP 636 PS 637

## 2017-12-08 PROCEDURE — 12000008 ZZH R&B INTERMEDIATE UMMC

## 2017-12-08 PROCEDURE — 86900 BLOOD TYPING SEROLOGIC ABO: CPT | Performed by: INTERNAL MEDICINE

## 2017-12-08 PROCEDURE — 36592 COLLECT BLOOD FROM PICC: CPT | Performed by: NURSE PRACTITIONER

## 2017-12-08 PROCEDURE — 82945 GLUCOSE OTHER FLUID: CPT | Performed by: NURSE PRACTITIONER

## 2017-12-08 PROCEDURE — 25000128 H RX IP 250 OP 636: Performed by: PHYSICIAN ASSISTANT

## 2017-12-08 PROCEDURE — 86850 RBC ANTIBODY SCREEN: CPT | Performed by: INTERNAL MEDICINE

## 2017-12-08 PROCEDURE — 25000125 ZZHC RX 250: Performed by: RADIOLOGY

## 2017-12-08 PROCEDURE — P9016 RBC LEUKOCYTES REDUCED: HCPCS | Performed by: INTERNAL MEDICINE

## 2017-12-08 PROCEDURE — 25000125 ZZHC RX 250

## 2017-12-08 PROCEDURE — 86901 BLOOD TYPING SEROLOGIC RH(D): CPT | Performed by: INTERNAL MEDICINE

## 2017-12-08 PROCEDURE — 62270 DX LMBR SPI PNXR: CPT

## 2017-12-08 PROCEDURE — 86923 COMPATIBILITY TEST ELECTRIC: CPT | Performed by: INTERNAL MEDICINE

## 2017-12-08 PROCEDURE — 88184 FLOWCYTOMETRY/ TC 1 MARKER: CPT | Performed by: NURSE PRACTITIONER

## 2017-12-08 PROCEDURE — 89050 BODY FLUID CELL COUNT: CPT | Performed by: NURSE PRACTITIONER

## 2017-12-08 PROCEDURE — 25000128 H RX IP 250 OP 636

## 2017-12-08 PROCEDURE — 97165 OT EVAL LOW COMPLEX 30 MIN: CPT | Mod: GO | Performed by: OCCUPATIONAL THERAPIST

## 2017-12-08 PROCEDURE — 40000141 ZZH STATISTIC PERIPHERAL IV START W/O US GUIDANCE

## 2017-12-08 PROCEDURE — 84157 ASSAY OF PROTEIN OTHER: CPT | Performed by: NURSE PRACTITIONER

## 2017-12-08 PROCEDURE — 88185 FLOWCYTOMETRY/TC ADD-ON: CPT | Performed by: NURSE PRACTITIONER

## 2017-12-08 PROCEDURE — 40001004 ZZHCL STATISTIC FLOW INT 9-15 ABY TC 88188: Performed by: NURSE PRACTITIONER

## 2017-12-08 PROCEDURE — 87205 SMEAR GRAM STAIN: CPT | Performed by: NURSE PRACTITIONER

## 2017-12-08 PROCEDURE — 25000132 ZZH RX MED GY IP 250 OP 250 PS 637

## 2017-12-08 RX ADMIN — ACYCLOVIR 400 MG: 400 TABLET ORAL at 08:24

## 2017-12-08 RX ADMIN — OXYCODONE HYDROCHLORIDE 30 MG: 30 TABLET ORAL at 17:19

## 2017-12-08 RX ADMIN — SODIUM CHLORIDE 1000 ML: 9 INJECTION, SOLUTION INTRAVENOUS at 05:38

## 2017-12-08 RX ADMIN — POLYETHYLENE GLYCOL 3350 17 G: 17 POWDER, FOR SOLUTION ORAL at 08:29

## 2017-12-08 RX ADMIN — OXYCODONE HYDROCHLORIDE 30 MG: 30 TABLET ORAL at 04:31

## 2017-12-08 RX ADMIN — SENNOSIDES AND DOCUSATE SODIUM 1 TABLET: 8.6; 5 TABLET ORAL at 20:39

## 2017-12-08 RX ADMIN — MONTELUKAST SODIUM 20 MG: 10 TABLET, FILM COATED ORAL at 08:27

## 2017-12-08 RX ADMIN — PREDNISONE 60 MG: 10 TABLET ORAL at 20:40

## 2017-12-08 RX ADMIN — VITAMIN D, TAB 1000IU (100/BT) 5000 UNITS: 25 TAB at 08:27

## 2017-12-08 RX ADMIN — PREDNISONE 60 MG: 10 TABLET ORAL at 08:25

## 2017-12-08 RX ADMIN — CYCLOBENZAPRINE HYDROCHLORIDE 10 MG: 10 TABLET, FILM COATED ORAL at 08:25

## 2017-12-08 RX ADMIN — MORPHINE SULFATE 30 MG: 30 TABLET, EXTENDED RELEASE ORAL at 11:27

## 2017-12-08 RX ADMIN — RANITIDINE 75 MG: 75 TABLET, FILM COATED ORAL at 20:40

## 2017-12-08 RX ADMIN — METHOTREXATE: 25 INJECTION, SOLUTION INTRA-ARTERIAL; INTRAMUSCULAR; INTRATHECAL; INTRAVENOUS at 14:11

## 2017-12-08 RX ADMIN — CELECOXIB 200 MG: 200 CAPSULE ORAL at 08:27

## 2017-12-08 RX ADMIN — CROMOLYN SODIUM 1 DROP: 40 SOLUTION/ DROPS OPHTHALMIC at 08:29

## 2017-12-08 RX ADMIN — POTASSIUM CHLORIDE 20 MEQ: 750 TABLET, EXTENDED RELEASE ORAL at 08:39

## 2017-12-08 RX ADMIN — FERROUS SULFATE TAB 325 MG (65 MG ELEMENTAL FE) 325 MG: 325 (65 FE) TAB at 11:27

## 2017-12-08 RX ADMIN — METHOCARBAMOL 750 MG: 750 TABLET ORAL at 08:29

## 2017-12-08 RX ADMIN — MORPHINE SULFATE 60 MG: 30 TABLET, EXTENDED RELEASE ORAL at 20:40

## 2017-12-08 RX ADMIN — CYCLOBENZAPRINE HYDROCHLORIDE 10 MG: 10 TABLET, FILM COATED ORAL at 14:58

## 2017-12-08 RX ADMIN — OXYCODONE HYDROCHLORIDE 30 MG: 30 TABLET ORAL at 21:20

## 2017-12-08 RX ADMIN — CROMOLYN SODIUM 1 DROP: 40 SOLUTION/ DROPS OPHTHALMIC at 20:41

## 2017-12-08 RX ADMIN — FUROSEMIDE 20 MG: 20 TABLET ORAL at 17:05

## 2017-12-08 RX ADMIN — MONTELUKAST SODIUM 20 MG: 10 TABLET, FILM COATED ORAL at 20:40

## 2017-12-08 RX ADMIN — MORPHINE SULFATE 30 MG: 30 TABLET, EXTENDED RELEASE ORAL at 04:31

## 2017-12-08 RX ADMIN — CROMOLYN SODIUM 1 DROP: 40 SOLUTION/ DROPS OPHTHALMIC at 17:05

## 2017-12-08 RX ADMIN — DICLOFENAC 4 G: 10 GEL TOPICAL at 11:27

## 2017-12-08 RX ADMIN — CALCITRIOL 0.5 MCG: 0.5 CAPSULE, LIQUID FILLED ORAL at 11:27

## 2017-12-08 RX ADMIN — CALCITRIOL 0.25 MCG: 0.25 CAPSULE, LIQUID FILLED ORAL at 08:29

## 2017-12-08 RX ADMIN — RANITIDINE 75 MG: 75 TABLET, FILM COATED ORAL at 08:27

## 2017-12-08 RX ADMIN — DIAZEPAM 5 MG: 5 TABLET ORAL at 14:58

## 2017-12-08 RX ADMIN — LEVOTHYROXINE SODIUM 224 MCG: 112 TABLET ORAL at 08:40

## 2017-12-08 RX ADMIN — METHOCARBAMOL 750 MG: 750 TABLET ORAL at 20:40

## 2017-12-08 RX ADMIN — DICLOFENAC 4 G: 10 GEL TOPICAL at 08:30

## 2017-12-08 RX ADMIN — DICLOFENAC 4 G: 10 GEL TOPICAL at 17:05

## 2017-12-08 RX ADMIN — RANITIDINE 75 MG: 75 TABLET, FILM COATED ORAL at 14:58

## 2017-12-08 RX ADMIN — VINCRISTINE SULFATE 0.8 MG: 1 INJECTION, SOLUTION INTRAVENOUS at 16:26

## 2017-12-08 RX ADMIN — OXYCODONE HYDROCHLORIDE 30 MG: 30 TABLET ORAL at 08:45

## 2017-12-08 RX ADMIN — LIDOCAINE: 50 OINTMENT TOPICAL at 20:41

## 2017-12-08 RX ADMIN — DIAZEPAM 5 MG: 5 TABLET ORAL at 20:40

## 2017-12-08 RX ADMIN — CYCLOBENZAPRINE HYDROCHLORIDE 10 MG: 10 TABLET, FILM COATED ORAL at 20:40

## 2017-12-08 RX ADMIN — DIAZEPAM 5 MG: 5 TABLET ORAL at 08:24

## 2017-12-08 RX ADMIN — CROMOLYN SODIUM 1 DROP: 40 SOLUTION/ DROPS OPHTHALMIC at 11:27

## 2017-12-08 RX ADMIN — ONDANSETRON HYDROCHLORIDE 16 MG: 8 TABLET, FILM COATED ORAL at 14:58

## 2017-12-08 RX ADMIN — LIDOCAINE HYDROCHLORIDE 10 ML: 10 INJECTION, SOLUTION EPIDURAL; INFILTRATION; INTRACAUDAL; PERINEURAL at 14:05

## 2017-12-08 RX ADMIN — FERROUS SULFATE TAB 325 MG (65 MG ELEMENTAL FE) 325 MG: 325 (65 FE) TAB at 08:28

## 2017-12-08 RX ADMIN — FERROUS SULFATE TAB 325 MG (65 MG ELEMENTAL FE) 325 MG: 325 (65 FE) TAB at 17:19

## 2017-12-08 RX ADMIN — CELECOXIB 200 MG: 200 CAPSULE ORAL at 20:39

## 2017-12-08 RX ADMIN — FUROSEMIDE 20 MG: 20 TABLET ORAL at 20:39

## 2017-12-08 RX ADMIN — ACYCLOVIR 400 MG: 400 TABLET ORAL at 20:40

## 2017-12-08 RX ADMIN — PROCHLORPERAZINE MALEATE 10 MG: 10 TABLET, FILM COATED ORAL at 21:20

## 2017-12-08 RX ADMIN — ALLOPURINOL 300 MG: 300 TABLET ORAL at 08:28

## 2017-12-08 RX ADMIN — CETIRIZINE HYDROCHLORIDE 10 MG: 10 TABLET, FILM COATED ORAL at 08:27

## 2017-12-08 RX ADMIN — METHOCARBAMOL 750 MG: 750 TABLET ORAL at 17:05

## 2017-12-08 RX ADMIN — METHOCARBAMOL 750 MG: 750 TABLET ORAL at 11:27

## 2017-12-08 RX ADMIN — DICLOFENAC 4 G: 10 GEL TOPICAL at 20:41

## 2017-12-08 RX ADMIN — CETIRIZINE HYDROCHLORIDE 10 MG: 10 TABLET, FILM COATED ORAL at 20:40

## 2017-12-08 RX ADMIN — ETOPOSIDE 120 MG: 20 INJECTION, SOLUTION, CONCENTRATE INTRAVENOUS at 16:21

## 2017-12-08 RX ADMIN — CETIRIZINE HYDROCHLORIDE 10 MG: 10 TABLET, FILM COATED ORAL at 14:58

## 2017-12-08 ASSESSMENT — PAIN DESCRIPTION - DESCRIPTORS
DESCRIPTORS: ACHING

## 2017-12-08 NOTE — PROGRESS NOTES
Care Coordinator- Discharge Planning     Admission Date/Time:  12/7/2017  Attending MD:  Ely Alanis MD  Hematologist: Dr. Sauceda/[facility]     Data  Date of initial CC assessment:  12/08/2017  Chart reviewed, discussed with interdisciplinary team.   Patient was admitted for:   1. High grade B-cell lymphoma (H)         Assessment  Concerns with insurance coverage for discharge needs: None.  Current Living Situation: Patient lives with family.  Support System: Supportive  Services Involved: Outpatient Infusion Services  Transportation: Family or Friend will provide  Barriers to Discharge: Completion of chemotherapy    Coordination of Care and Referrals: Provided patient/family with options for Outpatient Infusion Services.    Writer met with patient to review discharge needs. Request sent to [facility] for follow up per patient's preferences.      Plan  Anticipated Discharge Date:  12/11/2017  Anticipated Discharge Plan:  Home with clinic follow up    CTS Handoff completed:  DAINEL Diaz  Phone [phone]  Pager [phone]

## 2017-12-08 NOTE — PROCEDURES
[facility] [place]     Neuroradiology Procedure Note     Lumbar Puncture Under Fluoroscopic Guidance:      12/8/2017 2:25 PM    Performed by: Ken Ang MD  Authorized by: MD Ken Burgos MD    Indications: Intrathecal chemotherapy    Consent given by: Patient who states understanding of the procedure being performed after discussing the risks, benefits and alternatives.    Prior to the start of the procedure and with procedural staff participation, I verbally confirmed the patient s identity using two indicators, relevant allergies, that the procedure was appropriate and matched the consent or emergent situation, and that the correct equipment/implants were available. Immediately prior to starting the procedure I conducted the Time Out with the procedural staff and re-confirmed the patient s name, procedure, and site/side. (The Joint Commission universal protocol was followed.) Yes    Procedure:    Under sterile conditions the patient was positioned lying prone. Using fluoroscopy, needle entrance side was defined.  Betadine solution and sterile drapes were utilized.  Local anesthetic at the site: 4 ml of lidocaine 1% without epinephrine.    A 22 gauge 5.0 inch spinal needle was inserted at the L3-L4 interspace.  Opening Pressurewas not checked.  A total of 8mL of clear and colorless spinal fluid was obtained and sent to the laboratory.   Intrathecal chemotherapy was administered by heme/onc provider.  After the needle was removed, a bandaid and pressure were applied and the patient was instructed to stay horizontal until the results were back.    Complications:  None  Patient tolerance: Patient tolerated the procedure well with no immediate complications.    Condition: Stable Patient is transferred to Inpatient unit for post procedure observation.    Ken Ang 12/8/2017 2:25 PM

## 2017-12-08 NOTE — PROGRESS NOTES
"    [facility], [place]    Hematology / Oncology Progress Note    Date of Service (when I saw the patient): 12/08/2017     Assessment & Plan   Tisha Arias is a 57 year old female with high grade DLBCL and PMHx significant for breast cancer s/p bilateral mastectomies & BENJIE/BSO, papillary thyroid cancer s/p total thyroidectomy, chronic chest wall pain, muscle spasms, asthma and h/o Rachael en Y gastric bypass, who presents for Cycle 4 of DA-R-EPOCH.    HEME/ONC  # High grade B cell lymphoma. \"Double hit-like\". Primary is Dr. Sauceda. Patient diagnosed August 2017. Lytic lesion of right 10th rib with extension. Disease localized above the diaphragm in thoracic and paravertebral soft tissue and mediastinal lymphadenopathy. 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 Staging LP and BMBx were negative for lymphoma. Initiated cycle 1 DA-R-EPOCH on 10/6/17. S/p cycle 2 10/27/17. She now presents for cycle 4 DA-R-EPOCH.  -Port-a-cath in place  -Labs ok to begin therapy      Treatment plan: Cycle 4 DA-R-EPOCH (with 20% dose increase) (Day 1=12/7/17).  Today is Day 2.  -Rituxan 750 mg (Day 0)  -Prednisone 60 mg  (Day 1-5)  -Etoposide 120 mg CIVI (Day 1-4)  -Vincristine 0.8 mg/Doxorubicin 24mg CIVI (Day 1-4)  -Cyclophosphamide 1810 mg (Day 5)  -LP with IT chemotherapy scheduled 12/8 @1300 with Xray guided.  -Premeds: Tylenol and benadryl prior to Rituxan, Zofran (D1-5), Emend (d5), Dex (D6-7).  -Neulasta after discharge (aiming for Tuesday or Wed as DC likely Monday)  -Discharge with D6-7 Dexamethasone      #Anemia. 2/2 disease and chemotherapy.  - Hgb 8 this AM, and patient hypotensive. Otherwise asymptomatic. 2U PRBCs per Dr. Alanis.   - While inpatient, change transfusion parameters to keep Hgb >8, Plts >10K.   - At hospital discharge, continue to monitor with " twice weekly labs and transfuse if Hgb<8 and Plt<10k.       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 Follows with Dr. Crawley. Diagnosed in 2011. Oncotype recurrence score of 11 (7% recurrence risk after 5 years of tamoxifen). S/p bilateral mastectomies and BENJIE/BSO in 2012. - Was initially on Arimidex ([id number]) but changed to Tamoxifen due to arthralgias. Tamoxifen held since 10/5 and continue to hold with chemotherapy. Last f/u Dr. Crawley was on 11/27/17.      # Papillary thyroid cancer. Follows with Dr. Castro. Diagnosed in 2013. S/p total thyroidectomy and CND. Received ETOH treatment August 2014, October 2014 and December 2014. Has post-surgical hypothyroidism.  -Continue PTA Levothyroxine.  -Continue PTA calcitriol.      GI  # H/o Rachael en Y gastric bypass. Likely affecting absorption, thus patient is on multiple supplements.  - Continue supplements (calcium citrate-vitamin D, vitamin D3, magnesium citrate). Also has iron deficiency anemia likely from poor absorption and per Dr. Sauceda, she should continue 325mg PO ferrous sulfate TID.       Pain  # Chronic chest wall pain after mastectomy.   -Continue MS Contin 30/30/60 mg TID  -Oxycodone 15 mg q4hrs prn.  -Celebrex 200 BID.   -Lidocaine cream prn.      #Chronic muscle cramps.   -Continue KCl 20 mEq daily, Robaxin 750 mg QID, Valium 5 mg TID prn, Flexeril prn.      ID  #Anti-infectives.  -Continue PTA acyclovir.  -Will hold Fluconazole/Levaquin ppx as patient is not neutropenic. To resume on discharge or when ANC <1000.      MISC  #Lymphedema of lower extremities  - Worsening BLE edema; ECHO done today in clinic with EF 55-60%. BLE doppler US without DVTs.  Chronic issue, will schedule lasix today.   -Continue PTA HCTZ and Lasix. (held this AM due to hypotension). Plan to give 20mg oral lasix inbetween the 2U PRBCs if BP appropriate  -Monitor BMP daily     #Hypotension:   - asymptomatic. 1L NS overnight; hold lasix HCTZ this am (as above).  "Continue to monitor and consider adjusting.    #Asthma, allergies.   -Continue PTA singular, zyrtec.  -Patient states she is not taking her inhalers, not entered      FEN  -IVF per chemo regimen  -PTA electrolyte replacements and lyte protocol  -RDAT      PPx  -VTE: Lovenox ppx (held today and tomorrow for LP tomorrow).   -PUD: PTA ranitidine  -Bowels: PTA docusate and miralax       Dispo: Pending completion of chemotherapy and resolution of any acute toxicities, likely 4-5 day stay (12/11).      Above plan discussed with staff physician, Dr. Alanis.      Shanna Cheney, [facility]  Hematology/Oncology  #[id number]      Interval History   Elvin reports feeling well overall today. She reports her she is exhausted having to consistently \"roll with the punches\" on this cancer journey. She is frustrated her BP is now low, and her LE edema is worse. She denies any fevers, chills, night sweats, dizziness, lightheadness or fainting. She denies nausea or vomiting. Last BM yesterday. She had some lower back pain last night, this is not her usual site of pain.  Otherwise pain at mastectomy site is stable.     Physical Exam   Temp: 97.6  F (36.4  C) Temp src: Oral BP: 119/61 Pulse: 85 Heart Rate: 85 Resp: 19 SpO2: 92 % O2 Device: None (Room air)    Vitals:    12/07/17 1048 12/08/17 0952   Weight: 85.5 kg (188 lb 6.4 oz) 89.8 kg (198 lb)     Vital Signs with Ranges  Temp:  [96.1  F (35.6  C)-97.6  F (36.4  C)] 97.6  F (36.4  C)  Pulse:  [] 85  Heart Rate:  [] 85  Resp:  [16-19] 19  BP: ()/(33-66) 119/61  SpO2:  [92 %-95 %] 92 %  I/O last 3 completed shifts:  In: 3734.4 [P.O.:1230; I.V.:1030; IV Piggyback:1474.4]  Out: 1000 [Urine:1000]     Constitutional: Pleasant female seen sitting in bed. No apparent distress, and appears stated age.  Eyes: Lids and lashes normal, sclera clear, conjunctiva normal.  ENT: Normocephalic, oral pharynx with moist mucus membranes, tonsils without erythema or exudates, gums normal and good " dentition.   Respiratory: No increased work of breathing, good air exchange, clear to auscultation bilaterally, no crackles or wheezing.  Cardiovascular: Regular rate and rhythm, normal S1 and S2, and no murmur noted.  GI: No masses or scars. +BS. Soft. Tenderness on palpation to LUQ.  Skin: No bruising or bleeding, no redness, warmth, or swelling, no rashes, no lesions, no jaundice.  Extremities: There is no redness, warmth, or swelling of the joints. +1 lower extremity edema, compression stockings in place. No cyanosis.  Neurologic: Awake, alert, oriented to name, place and time.    Vascular access: Port, c/d/i    Medications     - MEDICATION INSTRUCTIONS -       - MEDICATION INSTRUCTIONS -       NaCl         heparin lock flush  5-10 mL Intracatheter Q24H     heparin  5 mL Intracatheter Q28 Days     ondansetron  16 mg Oral Q24H     [START ON 12/11/2017] fosaprepitant (EMEND) 150 mg intermittent infusion  150 mg Intravenous Once     [START ON 12/12/2017] dexamethasone  8 mg Oral Daily     INTRATHECAL chemo - Cytarabine and/or methotrexate and/or Hydrocortisone   Intrathecal Once     predniSONE  30 mg/m2 (Treatment Plan Recorded) Oral BID     Chemotherapy Infusing-Continuous Infusion   Does not apply Q8H     etoposide (TOPOSAR) chemo infusion  60 mg/m2 (Treatment Plan Recorded) Intravenous Q24H     vinCRIStine/DOXOrubicin (ONCOVIN/ADRIAMYCIN) chemo infusion  0.4 mg/m2 (Treatment Plan Recorded) Intravenous Q24H     [START ON 12/11/2017] cyclophosphamide (CYTOXAN) chemo infusion  1,800 mg Intravenous Once     [START ON 12/12/2017] filgrastim (NEUPOGEN/GRANIX) intravenous  5 mcg/kg (Treatment Plan Recorded) Intravenous Daily at 8 pm     acyclovir  400 mg Oral BID     allopurinol  300 mg Oral Daily     calcitRIOL  0.5 mcg Oral Daily with lunch     calcium citrate-vitamin D  2 tablet Oral Once per day on Mon Thu     celecoxib  200 mg Oral BID     cetirizine  10 mg Oral TID     cholecalciferol  5,000 Units Oral Daily      cromolyn  1 drop Both Eyes 4x Daily     cyclobenzaprine  10 mg Oral TID     diazepam  5 mg Oral TID     diclofenac  4 g Topical 4x Daily     ferrous sulfate  325 mg Oral TID w/meals     furosemide  20 mg Oral BID     hydrochlorothiazide  12.5 mg Oral Daily     levothyroxine  224 mcg Oral Once per day on Mon Tue Wed Thu Fri Sat     methocarbamol  750 mg Oral 4x Daily     montelukast  20 mg Oral BID     morphine  30 mg Oral BID     potassium chloride SA  20 mEq Oral Daily     ranitidine  75 mg Oral TID     senna-docusate  1-2 tablet Oral QPM     calcitRIOL  0.25 mcg Oral QAM     morphine (MS CONTIN) 12 hr tablet 60 mg  60 mg Oral QPM     [START ON 12/10/2017] levothyroxine  336 mcg Oral Weekly       Data   Results for orders placed or performed during the hospital encounter of 12/07/17 (from the past 24 hour(s))   US Lower Extremity Venous Duplex Bilateral    Narrative    EXAMINATION: DOPPLER VENOUS ULTRASOUND OF BILATERAL LOWER EXTREMITIES,  12/7/2017 2:00 PM     COMPARISON: 10/21/2017    HISTORY: r/o dvt, has edema, DLBCL increased hypercoag;     TECHNIQUE:  Gray-scale evaluation with compression, spectral flow and  color Doppler assessment of the deep venous system of both legs from  groin to knee, and then at the ankles.    FINDINGS:  In both lower extremities, the common femoral, femoral, popliteal and  posterior tibial veins demonstrate normal compressibility and blood  flow.      Impression    IMPRESSION:  1.  No evidence of deep venous thrombosis in either lower extremity.    I have personally reviewed the examination and initial interpretation  and I agree with the findings.    OMAR SINGLETON MD   CBC with platelets differential   Result Value Ref Range    WBC 7.4 4.0 - 11.0 10e9/L    RBC Count 2.79 (L) 3.8 - 5.2 10e12/L    Hemoglobin 8.0 (L) 11.7 - 15.7 g/dL    Hematocrit 26.5 (L) 35.0 - 47.0 %    MCV 95 78 - 100 fl    MCH 28.7 26.5 - 33.0 pg    MCHC 30.2 (L) 31.5 - 36.5 g/dL    RDW 26.0 (H) 10.0 - 15.0  %    Platelet Count 290 150 - 450 10e9/L    Diff Method Automated Method     % Neutrophils 89.0 %    % Lymphocytes 4.2 %    % Monocytes 5.7 %    % Eosinophils 0.0 %    % Basophils 0.3 %    % Immature Granulocytes 0.8 %    Nucleated RBCs 0 0 /100    Absolute Neutrophil 6.6 1.6 - 8.3 10e9/L    Absolute Lymphocytes 0.3 (L) 0.8 - 5.3 10e9/L    Absolute Monocytes 0.4 0.0 - 1.3 10e9/L    Absolute Eosinophils 0.0 0.0 - 0.7 10e9/L    Absolute Basophils 0.0 0.0 - 0.2 10e9/L    Abs Immature Granulocytes 0.1 0 - 0.4 10e9/L    Absolute Nucleated RBC 0.0    Comprehensive metabolic panel   Result Value Ref Range    Sodium 142 133 - 144 mmol/L    Potassium 3.6 3.4 - 5.3 mmol/L    Chloride 105 94 - 109 mmol/L    Carbon Dioxide 26 20 - 32 mmol/L    Anion Gap 10 3 - 14 mmol/L    Glucose 141 (H) 70 - 99 mg/dL    Urea Nitrogen 16 7 - 30 mg/dL    Creatinine 0.84 0.52 - 1.04 mg/dL    GFR Estimate 70 >60 mL/min/1.7m2    GFR Estimate If Black 85 >60 mL/min/1.7m2    Calcium 8.2 (L) 8.5 - 10.1 mg/dL    Bilirubin Total 0.5 0.2 - 1.3 mg/dL    Albumin 2.5 (L) 3.4 - 5.0 g/dL    Protein Total 5.4 (L) 6.8 - 8.8 g/dL    Alkaline Phosphatase 69 40 - 150 U/L    ALT 24 0 - 50 U/L    AST 20 0 - 45 U/L   ABO/Rh type and screen   Result Value Ref Range    Units Ordered 2     ABO O     RH(D) Pos     Antibody Screen Neg     Test Valid Only At          [facility]    Specimen Expires 12/11/2017     Crossmatch Red Blood Cells    Blood component   Result Value Ref Range    Unit Number [id number]     Blood Component Type Red Blood Cells LeukoReduced (Part 2)     Division Number 00     Status of Unit Released to care unit 12/08/2017 1137     Blood Product Code [id number]     Unit Status ISS    Blood component   Result Value Ref Range    Unit Number [id number]     Blood Component Type Red Blood Cells Leukocyte Reduced     Division Number 00     Status of Unit Ready for patient 12/08/2017 1118     Blood Product  Code [id number]     Unit Status KTAHLEEN      *Note: Due to a large number of results and/or encounters for the requested time period, some results have not been displayed. A complete set of results can be found in Results Review.

## 2017-12-08 NOTE — PLAN OF CARE
Problem: Patient Care Overview  Goal: Plan of Care/Patient Progress Review  Outcome: No Change    Soft BPs (80s/40s) this morning, pt asymptomatic. Recheck BP was 70s/30s. Moonlighter notified. 1L bolus ordered and administered. Will recheck BP again in 30 minutes. OVSS. CIVI dox/vinc and etoposide with positive blood return, day 1. Pt slept well overnight. Received oxycodone x2 for chronic chest pain r/t her mastectomy. Plan for LP at 1pm tomorrow. Continue POC.    Addendum: Recheck at midway point of bolus was 80/37. HR 84.

## 2017-12-08 NOTE — PROCEDURES
Intrathecal Chemo Administration Note   Tisha Arias   December 8, 2017    DIAGNOSIS: High grade B cell lymphoma  PROCEDURE: Intrathecal chemo administration   LOCATION: Radiology   INDICATION: CNS prophylaxis  Lumbar puncture performed and CSF samples collected by the General Radiology team under fluoro.   This writer then administered methotrexate 12mg in 6ml preservative free NS without apparent complication. Chemotherapy previously double checked by two oncology RNs and also verified by this writer and IR technician.   Complications: None immediately.   Chemo instillation performed by: Annita Chi DNP, APRN, CNP  Hematology/Oncology  Pager: [phone]

## 2017-12-08 NOTE — PLAN OF CARE
Problem: Patient Care Overview  Goal: Plan of Care/Patient Progress Review  Edema 7D: Initial edema evaluation completed. Patient with increases in B LEs edema causing compression stockings to have inappropriate fit. Skin intact with soft 1+/2+ pitting distally. Application of GCB from MTPs to knee creases for management of LE edema and protection of skin integrity. Will transition back into compression stockings when able. Remove wraps if causing pain/numbness or become soiled.

## 2017-12-08 NOTE — PLAN OF CARE
Problem: Chemotherapy Effects (Adult)  Goal: Signs and Symptoms of Listed Potential Problems Will be Absent, Minimized or Managed (Chemotherapy Effects)  Signs and symptoms of listed potential problems will be absent, minimized or managed by discharge/transition of care (reference Chemotherapy Effects (Adult) CPG).     VSS. Afebrile. Up to the bathroom independently. No nausea. States appetite is fair. Given prn oxy x 1 and prn lidocaine ointment x 1 to chest and mid back for her chronic pain. Dose # 1 of etoposide and vincristine/doxorubicin infusing over the next 23 hours. Continues with moderate LE edema. Had an US on the previous shift which ruled out DVT. Patient appears calmer now, but was anxious and frustrated earlier in the shift stating that she was told her chemo would be started as soon as she was admitted to the floor.

## 2017-12-08 NOTE — PROGRESS NOTES
12/08/17 0730   Rehab Discipline   Discipline OT   Type of Visit   Type of visit Initial Edema Evaluation   General Information   Start of care 12/08/17   Referring physician Shanna Cheney APRN CNP   Orders Evaluate and treat as indicated   Order date 12/08/17   Medical diagnosis 57 year old female with high grade DLBCL and PMHx significant for breast cancer s/p bilateral mastectomies & BENJIE/BSO, papillary thyroid cancer s/p total thyroidectomy, chronic chest wall pain, muscle spasms, asthma and h/o Rachael en Y gastric bypass, who presents for Cycle 4 of DA-R-EPOCH.   Edema onset (acute on chronic )   Affected body parts RLE;LLE   Edema etiology comments chemo, active cancer, IVFs.    Surgical / medical history reviewed Yes   Prior level of functional mobility Independent    Prior treatment Compression garments   General observations Patient uses compression stockings at baseline however inappropriate fit at this time.    Subjective Report   Patient report of symptoms Patient reports tightness in B LEs.    Pain   Pain comments patient reports no pain in LEs at this time.    Edema Exam / Assessment   Skin condition Pitting;Dryness;Intact   Skin condition comments Shiny appearance to skin due to increased swelling.    Pitting 1+;2+   Pitting location MTPs to knee creases    Range of Motion   ROM No deficits were identified   Strength   Strength No deficits were identified   Activities of Daily Living   Activities of Daily Living Independent    Sensory   Sensory perception Light touch   Light touch Intact   Planned Edema Interventions   Planned edema interventions Gradient compression bandaging;Fit for compression garment;Exercises;Precautions to prevent infection / exacerbation;Education   Planned edema intervention comments GCB applied to B LEs from MTPs to knee creases.    Clinical Impression   Criteria for skilled therapeutic intervention met Yes   Therapy diagnosis Recommend use of GCB at this time due to  inappropriate fit of compression stockings.    Influenced by the following impairments / conditions Edema   Assessment of Occupational Performance 1-3 Performance Deficits   Identified Performance Deficits Decreased functional mobility.    Clinical Decision Making (Complexity) Low complexity   Treatment frequency 5 times / week   Treatment duration 1-2 weeks   Patient / family and/or staff in agreement with plan of care Yes   Risks and benefits of therapy have been explained Yes   Total Evaluation Time   Total evaluation time 5

## 2017-12-08 NOTE — PROGRESS NOTES
Chemotherapy  D: Blood return is positive per port a catheter. Urine output is adequate as recorded in intake and output flowsheet.   I: Premedications given (see electronic medical administration record). Dose # 1 of etoposide and dose # 1 of vincristine/doxorubicin started to infuse over the next 23 hours.   A: Tolerating infusion.  P: Continue to monitor urine output and symptoms of nausea. Screen for symptoms of toxicity.

## 2017-12-08 NOTE — PLAN OF CARE
Problem: Patient Care Overview  Goal: Plan of Care/Patient Progress Review  Outcome: No Change  RBC's started in new peripheral intravenous catheter. She was in imaging for IT chemo and downstream occlusion noted. It appeared slightly dislodged and she will need another peripheral intravenous catheter on return to complete transfusion. She will also need an additional 1 unit RBC and may be able to get furosemide if BP supports. Mild hypotension has continued on this shift and she is concerned about bilateral lower extremity edema. Lymphedema service consulted and placed wraps.

## 2017-12-09 ENCOUNTER — APPOINTMENT (OUTPATIENT)
Dept: OCCUPATIONAL THERAPY | Facility: CLINIC | Age: 57
End: 2017-12-09
Attending: INTERNAL MEDICINE
Payer: COMMERCIAL

## 2017-12-09 LAB
ALBUMIN SERPL-MCNC: 2.6 G/DL (ref 3.4–5)
ALP SERPL-CCNC: 68 U/L (ref 40–150)
ALT SERPL W P-5'-P-CCNC: 24 U/L (ref 0–50)
ANION GAP SERPL CALCULATED.3IONS-SCNC: 8 MMOL/L (ref 3–14)
AST SERPL W P-5'-P-CCNC: 18 U/L (ref 0–45)
BASOPHILS # BLD AUTO: 0 10E9/L (ref 0–0.2)
BASOPHILS NFR BLD AUTO: 0.1 %
BILIRUB SERPL-MCNC: 0.6 MG/DL (ref 0.2–1.3)
BUN SERPL-MCNC: 14 MG/DL (ref 7–30)
CALCIUM SERPL-MCNC: 7.7 MG/DL (ref 8.5–10.1)
CHLORIDE SERPL-SCNC: 108 MMOL/L (ref 94–109)
CO2 SERPL-SCNC: 27 MMOL/L (ref 20–32)
CREAT SERPL-MCNC: 0.77 MG/DL (ref 0.52–1.04)
DIFFERENTIAL METHOD BLD: ABNORMAL
EOSINOPHIL # BLD AUTO: 0 10E9/L (ref 0–0.7)
EOSINOPHIL NFR BLD AUTO: 0 %
ERYTHROCYTE [DISTWIDTH] IN BLOOD BY AUTOMATED COUNT: 24.4 % (ref 10–15)
GFR SERPL CREATININE-BSD FRML MDRD: 77 ML/MIN/1.7M2
GLUCOSE SERPL-MCNC: 143 MG/DL (ref 70–99)
HCT VFR BLD AUTO: 31.6 % (ref 35–47)
HGB BLD-MCNC: 9.7 G/DL (ref 11.7–15.7)
IMM GRANULOCYTES # BLD: 0 10E9/L (ref 0–0.4)
IMM GRANULOCYTES NFR BLD: 0.2 %
LYMPHOCYTES # BLD AUTO: 0.4 10E9/L (ref 0.8–5.3)
LYMPHOCYTES NFR BLD AUTO: 4.6 %
MCH RBC QN AUTO: 29.2 PG (ref 26.5–33)
MCHC RBC AUTO-ENTMCNC: 30.7 G/DL (ref 31.5–36.5)
MCV RBC AUTO: 95 FL (ref 78–100)
MONOCYTES # BLD AUTO: 0.4 10E9/L (ref 0–1.3)
MONOCYTES NFR BLD AUTO: 4.4 %
NEUTROPHILS # BLD AUTO: 7.8 10E9/L (ref 1.6–8.3)
NEUTROPHILS NFR BLD AUTO: 90.7 %
NRBC # BLD AUTO: 0 10*3/UL
NRBC BLD AUTO-RTO: 0 /100
PLATELET # BLD AUTO: 296 10E9/L (ref 150–450)
POTASSIUM SERPL-SCNC: 3.6 MMOL/L (ref 3.4–5.3)
PROT SERPL-MCNC: 5.5 G/DL (ref 6.8–8.8)
RBC # BLD AUTO: 3.32 10E12/L (ref 3.8–5.2)
SODIUM SERPL-SCNC: 143 MMOL/L (ref 133–144)
WBC # BLD AUTO: 8.7 10E9/L (ref 4–11)

## 2017-12-09 PROCEDURE — 25000131 ZZH RX MED GY IP 250 OP 636 PS 637

## 2017-12-09 PROCEDURE — 25000125 ZZHC RX 250

## 2017-12-09 PROCEDURE — 25000128 H RX IP 250 OP 636: Performed by: PHYSICIAN ASSISTANT

## 2017-12-09 PROCEDURE — 25000128 H RX IP 250 OP 636: Performed by: INTERNAL MEDICINE

## 2017-12-09 PROCEDURE — 80053 COMPREHEN METABOLIC PANEL: CPT | Performed by: NURSE PRACTITIONER

## 2017-12-09 PROCEDURE — 97535 SELF CARE MNGMENT TRAINING: CPT | Mod: GO | Performed by: OCCUPATIONAL THERAPIST

## 2017-12-09 PROCEDURE — 36592 COLLECT BLOOD FROM PICC: CPT | Performed by: NURSE PRACTITIONER

## 2017-12-09 PROCEDURE — 85025 COMPLETE CBC W/AUTO DIFF WBC: CPT | Performed by: NURSE PRACTITIONER

## 2017-12-09 PROCEDURE — 25000132 ZZH RX MED GY IP 250 OP 250 PS 637: Performed by: NURSE PRACTITIONER

## 2017-12-09 PROCEDURE — 12000008 ZZH R&B INTERMEDIATE UMMC

## 2017-12-09 PROCEDURE — S0169 CALCITROL: HCPCS | Performed by: NURSE PRACTITIONER

## 2017-12-09 PROCEDURE — 99232 SBSQ HOSP IP/OBS MODERATE 35: CPT | Performed by: INTERNAL MEDICINE

## 2017-12-09 PROCEDURE — 25000128 H RX IP 250 OP 636

## 2017-12-09 PROCEDURE — 25000132 ZZH RX MED GY IP 250 OP 250 PS 637: Performed by: PHYSICIAN ASSISTANT

## 2017-12-09 PROCEDURE — 40000133 ZZH STATISTIC OT WARD VISIT: Performed by: OCCUPATIONAL THERAPIST

## 2017-12-09 RX ADMIN — CYCLOBENZAPRINE HYDROCHLORIDE 10 MG: 10 TABLET, FILM COATED ORAL at 09:59

## 2017-12-09 RX ADMIN — VITAMIN D, TAB 1000IU (100/BT) 5000 UNITS: 25 TAB at 09:57

## 2017-12-09 RX ADMIN — RANITIDINE 75 MG: 75 TABLET, FILM COATED ORAL at 14:54

## 2017-12-09 RX ADMIN — DIAZEPAM 5 MG: 5 TABLET ORAL at 09:57

## 2017-12-09 RX ADMIN — CALCITRIOL 0.5 MCG: 0.5 CAPSULE, LIQUID FILLED ORAL at 13:21

## 2017-12-09 RX ADMIN — METHOCARBAMOL 750 MG: 750 TABLET ORAL at 20:22

## 2017-12-09 RX ADMIN — DIAZEPAM 5 MG: 5 TABLET ORAL at 20:22

## 2017-12-09 RX ADMIN — HYDROCHLOROTHIAZIDE 12.5 MG: 12.5 CAPSULE ORAL at 09:58

## 2017-12-09 RX ADMIN — ONDANSETRON HYDROCHLORIDE 16 MG: 8 TABLET, FILM COATED ORAL at 14:20

## 2017-12-09 RX ADMIN — BISACODYL 15 MG: 5 TABLET, COATED ORAL at 20:23

## 2017-12-09 RX ADMIN — DICLOFENAC 4 G: 10 GEL TOPICAL at 10:00

## 2017-12-09 RX ADMIN — VINCRISTINE SULFATE 0.8 MG: 1 INJECTION, SOLUTION INTRAVENOUS at 15:37

## 2017-12-09 RX ADMIN — METHOCARBAMOL 750 MG: 750 TABLET ORAL at 16:39

## 2017-12-09 RX ADMIN — ENOXAPARIN SODIUM 40 MG: 40 INJECTION SUBCUTANEOUS at 20:21

## 2017-12-09 RX ADMIN — PROCHLORPERAZINE EDISYLATE 10 MG: 5 INJECTION INTRAMUSCULAR; INTRAVENOUS at 21:46

## 2017-12-09 RX ADMIN — MORPHINE SULFATE 30 MG: 30 TABLET, EXTENDED RELEASE ORAL at 13:21

## 2017-12-09 RX ADMIN — DIAZEPAM 5 MG: 5 TABLET ORAL at 14:54

## 2017-12-09 RX ADMIN — ACYCLOVIR 400 MG: 400 TABLET ORAL at 09:59

## 2017-12-09 RX ADMIN — ETOPOSIDE 120 MG: 20 INJECTION, SOLUTION, CONCENTRATE INTRAVENOUS at 15:37

## 2017-12-09 RX ADMIN — RANITIDINE 75 MG: 75 TABLET, FILM COATED ORAL at 20:22

## 2017-12-09 RX ADMIN — CROMOLYN SODIUM 1 DROP: 40 SOLUTION/ DROPS OPHTHALMIC at 13:22

## 2017-12-09 RX ADMIN — PREDNISONE 60 MG: 10 TABLET ORAL at 20:22

## 2017-12-09 RX ADMIN — FERROUS SULFATE TAB 325 MG (65 MG ELEMENTAL FE) 325 MG: 325 (65 FE) TAB at 13:21

## 2017-12-09 RX ADMIN — METHOCARBAMOL 750 MG: 750 TABLET ORAL at 13:21

## 2017-12-09 RX ADMIN — MORPHINE SULFATE 60 MG: 30 TABLET, EXTENDED RELEASE ORAL at 20:22

## 2017-12-09 RX ADMIN — CELECOXIB 200 MG: 200 CAPSULE ORAL at 20:23

## 2017-12-09 RX ADMIN — CYCLOBENZAPRINE HYDROCHLORIDE 10 MG: 10 TABLET, FILM COATED ORAL at 14:54

## 2017-12-09 RX ADMIN — OXYCODONE HYDROCHLORIDE 30 MG: 30 TABLET ORAL at 14:54

## 2017-12-09 RX ADMIN — FUROSEMIDE 20 MG: 20 TABLET ORAL at 10:00

## 2017-12-09 RX ADMIN — PREDNISONE 60 MG: 10 TABLET ORAL at 09:59

## 2017-12-09 RX ADMIN — OXYCODONE HYDROCHLORIDE 30 MG: 30 TABLET ORAL at 03:32

## 2017-12-09 RX ADMIN — METHOCARBAMOL 750 MG: 750 TABLET ORAL at 09:59

## 2017-12-09 RX ADMIN — FUROSEMIDE 20 MG: 20 TABLET ORAL at 20:22

## 2017-12-09 RX ADMIN — CROMOLYN SODIUM 1 DROP: 40 SOLUTION/ DROPS OPHTHALMIC at 20:22

## 2017-12-09 RX ADMIN — CETIRIZINE HYDROCHLORIDE 10 MG: 10 TABLET, FILM COATED ORAL at 14:54

## 2017-12-09 RX ADMIN — FERROUS SULFATE TAB 325 MG (65 MG ELEMENTAL FE) 325 MG: 325 (65 FE) TAB at 20:23

## 2017-12-09 RX ADMIN — MONTELUKAST SODIUM 20 MG: 10 TABLET, FILM COATED ORAL at 09:59

## 2017-12-09 RX ADMIN — ALLOPURINOL 300 MG: 300 TABLET ORAL at 09:57

## 2017-12-09 RX ADMIN — CROMOLYN SODIUM 1 DROP: 40 SOLUTION/ DROPS OPHTHALMIC at 10:00

## 2017-12-09 RX ADMIN — CALCITRIOL 0.25 MCG: 0.25 CAPSULE, LIQUID FILLED ORAL at 09:58

## 2017-12-09 RX ADMIN — MORPHINE SULFATE 30 MG: 30 TABLET, EXTENDED RELEASE ORAL at 06:01

## 2017-12-09 RX ADMIN — ACYCLOVIR 400 MG: 400 TABLET ORAL at 20:22

## 2017-12-09 RX ADMIN — LEVOTHYROXINE SODIUM 224 MCG: 112 TABLET ORAL at 06:01

## 2017-12-09 RX ADMIN — FERROUS SULFATE TAB 325 MG (65 MG ELEMENTAL FE) 325 MG: 325 (65 FE) TAB at 09:58

## 2017-12-09 RX ADMIN — MONTELUKAST SODIUM 20 MG: 10 TABLET, FILM COATED ORAL at 20:23

## 2017-12-09 RX ADMIN — RANITIDINE 75 MG: 75 TABLET, FILM COATED ORAL at 09:58

## 2017-12-09 RX ADMIN — PROCHLORPERAZINE EDISYLATE 10 MG: 5 INJECTION INTRAMUSCULAR; INTRAVENOUS at 08:52

## 2017-12-09 RX ADMIN — POTASSIUM CHLORIDE 20 MEQ: 750 TABLET, EXTENDED RELEASE ORAL at 09:59

## 2017-12-09 RX ADMIN — OXYCODONE HYDROCHLORIDE 30 MG: 30 TABLET ORAL at 19:45

## 2017-12-09 RX ADMIN — CETIRIZINE HYDROCHLORIDE 10 MG: 10 TABLET, FILM COATED ORAL at 20:22

## 2017-12-09 RX ADMIN — DICLOFENAC 4 G: 10 GEL TOPICAL at 20:21

## 2017-12-09 RX ADMIN — LIDOCAINE: 50 OINTMENT TOPICAL at 20:21

## 2017-12-09 RX ADMIN — CELECOXIB 200 MG: 200 CAPSULE ORAL at 09:57

## 2017-12-09 RX ADMIN — LIDOCAINE: 50 OINTMENT TOPICAL at 13:27

## 2017-12-09 RX ADMIN — CYCLOBENZAPRINE HYDROCHLORIDE 10 MG: 10 TABLET, FILM COATED ORAL at 20:22

## 2017-12-09 RX ADMIN — CETIRIZINE HYDROCHLORIDE 10 MG: 10 TABLET, FILM COATED ORAL at 09:58

## 2017-12-09 ASSESSMENT — PAIN DESCRIPTION - DESCRIPTORS
DESCRIPTORS: ACHING

## 2017-12-09 NOTE — PROGRESS NOTES
"    [facility] [place]    Hematology / Oncology Progress Note    Date of Service (when I saw the patient): 12/09/2017     Assessment & Plan   Tisha Arias is a 57 year old female with high grade DLBCL and PMHx significant for breast cancer s/p bilateral mastectomies & BENJIE/BSO, papillary thyroid cancer s/p total thyroidectomy, chronic chest wall pain, muscle spasms, asthma and h/o Rachael en Y gastric bypass, who is admitted for Cycle 4 of DA-R-EPOCH.    HEME/ONC  # High grade B cell lymphoma. \"Double hit-like\". Primary is Dr. Sauceda. Patient diagnosed August 2017. Lytic lesion of right 10th rib with extension. Disease localized above the diaphragm in thoracic and paravertebral soft tissue and mediastinal lymphadenopathy. 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 Staging LP and BMBx were negative for lymphoma. Initiated cycle 1 DA-R-EPOCH on 10/6/17. Has completed through 3 cycles. She now presents for cycle 4 DA-R-EPOCH with 20% dose increase.  -Port-a-cath in place      Treatment plan: Cycle 4 DA-R-EPOCH (Day 1=12/7/17).  Today is Day 3. Ok to further increase chemo rate to move up timing from q23hr to q22hr as d/w Dr. Alanis.   -Rituxan 750 mg (Day 1)  -Prednisone 60 mg  (Day 1-5)  -Etoposide 120 mg CIVI (Day 1-4)  -Vincristine 0.8 mg/Doxorubicin 24mg CIVI (Day 1-4)  -Cyclophosphamide 1800mg (Day 5)  -LP with IT chemotherapy completed 12/8 (Xray guided). CSF WBC 0, flow pending  -Premeds: Tylenol and benadryl prior to Rituxan, Zofran (D1-5), Emend (d5), Dex (D6-7).  -will need Neulasta after discharge (aiming for Tuesday or Wed as DC likely Monday)  -Discharge with D6-7 Dexamethasone      #Anemia. 2/2 disease and chemotherapy. Hgb 8 on 12/8 AM, pt  hypotensive. Otherwise asymptomatic. S/p 2U PRBCs per Dr. Alanis.   - While inpatient, change " transfusion parameters to keep Hgb >8, Plts >10K.   - At hospital discharge, continue to monitor with twice weekly labs and transfuse if Hgb<8 and Plt<10k.       #Stage 1, ER/MD-positive, HER2-negative breast cancer. Follows with Dr. Crawley. Diagnosed in 2011. Oncotype recurrence score of 11 (7% recurrence risk after 5 years of tamoxifen). S/p bilateral mastectomies and BENJIE/BSO in 2012. - Was initially on Arimidex (7080-1458) but changed to Tamoxifen due to arthralgias. Tamoxifen held since 10/5 and continue to hold with chemotherapy. Last f/u Dr. Crawley was on 11/27/17.      # Papillary thyroid cancer. Follows with Dr. Castro. Diagnosed in 2013. S/p total thyroidectomy and CND. Received ETOH treatment August 2014, October 2014 and December 2014. Has post-surgical hypothyroidism.  -Continue PTA Levothyroxine.  -Continue PTA calcitriol.      GI  # H/o Rachael en Y gastric bypass. Likely affecting absorption, thus patient is on multiple supplements.  - Continue supplements (calcium citrate-vitamin D, vitamin D3, magnesium citrate). Also has iron deficiency anemia likely from poor absorption and per Dr. Sauceda, she should continue 325mg PO ferrous sulfate TID.       Pain  # Chronic chest wall pain after mastectomy.   -Continue MS Contin 30/30/60 mg TID  -Oxycodone 15-30 mg q4hrs prn.  -Celebrex 200 BID.   -Lidocaine cream prn.      #Chronic muscle cramps.   -Continue KCl 20 mEq daily, Robaxin 750 mg QID, Valium 5 mg TID, Flexeril prn.      ID  #Anti-infectives.  -Continue PTA acyclovir.  -Will hold Fluconazole/Levaquin ppx as patient is not neutropenic. To resume on discharge or when ANC <1000.      CV  #Lymphedema of lower extremities.  -Worsening BLE edema; ECHO done 12/7 with EF 55-60%. BLE doppler US negative for DVTs. Chronic issue  -Continue PTA HCTZ 12.5mg daily and Lasix 20mg PO BID (hold if SBP<110 or DBP<60 given recent hypotension)     #Hypotension.  Developed asymptomatic early morning of 12/8. S/p 1L NS  "overnight and held HCTZ/Lasix on 12/8 AM. Improved s/p fluid and PRBC support. Now normotensive.      PULM  #Asthma, allergies.   -Continue PTA singular, zyrtec.  -Patient stated she is not taking her inhalers, not entered      FEN  -IVF per chemo regimen  -PTA electrolyte replacements and lyte protocol  -RDAT      PPx  -VTE: resume ppx Lovenox today (held prior to 12/8 LP)  -PUD: PTA ranitidine  -Bowels: PTA docusate and miralax       Dispo: Pending completion of chemotherapy and resolution of any acute toxicities. Anticipate discharge after completion of chemo on Day 5 (Monday 12/11)      Above plan discussed with staff physician, Dr. Alanis.      Charlee BARBAC  Hematology/Oncology  [phone]    Interval History   Elvin is \"frustrated\" that her chemo rate did not get advanced to run over 22 hours (instead of 23 hours) like  had stated could happen yesterday. She states this is going to further delay her discharge and she has to take vacation to get the time off of work to come in for chemo. We discussed that it will not change her discharge date, she will still be able to discharge on Monday. Physically she states she is feeling fine. Her legs are puffy (she states they are +1-2), wearing her compression stockings. BP ok this morning so she was able to get her Lasix.    Physical Exam   Temp: 98.1  F (36.7  C) Temp src: Oral BP: 117/68 Pulse: 111 Heart Rate: 84 Resp: 20 SpO2: 97 % O2 Device: None (Room air)    Vitals:    12/07/17 1048 12/08/17 0952 12/09/17 0835   Weight: 85.5 kg (188 lb 6.4 oz) 89.8 kg (198 lb) 88.7 kg (195 lb 9.6 oz)     Vital Signs with Ranges  Temp:  [95.9  F (35.5  C)-98.1  F (36.7  C)] 98.1  F (36.7  C)  Pulse:  [] 111  Heart Rate:  [] 84  Resp:  [16-20] 20  BP: (107-126)/(60-73) 117/68  SpO2:  [92 %-99 %] 97 %  I/O last 3 completed shifts:  In: 3104.8 [P.O.:1960; IV Piggyback:844.8]  Out: 3350 [Urine:3350]     Constitutional: Pleasant female seen standing at bedside. " Alert, interactive. NAD.   HEENT: NC/AT. MMM.   Respiratory: No increased work of breathing, good air exchange, clear to auscultation bilaterally, no crackles or wheezing.  Cardiovascular: Regular rate and rhythm, normal S1 and S2, and no murmur noted.  GI: +BS. Soft. Non-tender.   Skin: Clean, dry, intact.   Extremities: +1-2 b/l lower extremity edema, compression stockings in place.   Neurologic: Awake, alert, oriented to name, place and time.    Vascular access: Port, c/d/i    Medications     - MEDICATION INSTRUCTIONS -       - MEDICATION INSTRUCTIONS -       NaCl         etoposide (TOPOSAR) chemo infusion  60 mg/m2 (Treatment Plan Recorded) Intravenous Q24H     heparin lock flush  5-10 mL Intracatheter Q24H     heparin  5 mL Intracatheter Q28 Days     ondansetron  16 mg Oral Q24H     [START ON 12/11/2017] fosaprepitant (EMEND) 150 mg intermittent infusion  150 mg Intravenous Once     [START ON 12/12/2017] dexamethasone  8 mg Oral Daily     predniSONE  30 mg/m2 (Treatment Plan Recorded) Oral BID     Chemotherapy Infusing-Continuous Infusion   Does not apply Q8H     vinCRIStine/DOXOrubicin (ONCOVIN/ADRIAMYCIN) chemo infusion  0.4 mg/m2 (Treatment Plan Recorded) Intravenous Q24H     [START ON 12/11/2017] cyclophosphamide (CYTOXAN) chemo infusion  1,800 mg Intravenous Once     [START ON 12/12/2017] filgrastim (NEUPOGEN/GRANIX) intravenous  5 mcg/kg (Treatment Plan Recorded) Intravenous Daily at 8 pm     acyclovir  400 mg Oral BID     allopurinol  300 mg Oral Daily     calcitRIOL  0.5 mcg Oral Daily with lunch     calcium citrate-vitamin D  2 tablet Oral Once per day on Mon Thu     celecoxib  200 mg Oral BID     cetirizine  10 mg Oral TID     cholecalciferol  5,000 Units Oral Daily     cromolyn  1 drop Both Eyes 4x Daily     cyclobenzaprine  10 mg Oral TID     diazepam  5 mg Oral TID     diclofenac  4 g Topical 4x Daily     ferrous sulfate  325 mg Oral TID w/meals     furosemide  20 mg Oral BID      hydrochlorothiazide  12.5 mg Oral Daily     levothyroxine  224 mcg Oral Once per day on Mon Tue Wed Thu Fri Sat     methocarbamol  750 mg Oral 4x Daily     montelukast  20 mg Oral BID     morphine  30 mg Oral BID     potassium chloride SA  20 mEq Oral Daily     ranitidine  75 mg Oral TID     senna-docusate  1-2 tablet Oral QPM     calcitRIOL  0.25 mcg Oral QAM     morphine (MS CONTIN) 12 hr tablet 60 mg  60 mg Oral QPM     [START ON 12/10/2017] levothyroxine  336 mcg Oral Weekly       Data   Results for orders placed or performed during the hospital encounter of 12/07/17 (from the past 24 hour(s))   CSF Culture Aerobic Bacterial Tube 2   Result Value Ref Range    Specimen Description Cerebrospinal fluid     Culture Micro Culture negative monitoring continues    Glucose CSF: Tube 1   Result Value Ref Range    Glucose CSF 73 (H) 40 - 70 mg/dL   Protein total CSF: Tube 1   Result Value Ref Range    Protein Total CSF 68 (H) 15 - 60 mg/dL   Cell count with differential CSF: Tube 4   Result Value Ref Range    WBC CSF 0 0 - 5 /uL    RBC CSF 0 0 - 2 /uL    Tube Number 4 #    Color CSF Colorless CLRL^Colorless    Appearance CSF Clear CLER^Clear   Gram stain CSF Tube 2   Result Value Ref Range    Specimen Description Cerebrospinal fluid     Gram Stain No organisms seen     Gram Stain No WBC's seen    XR Lumbar Puncture Spinal Tap Diag    Narrative    Lumbar Puncture using Fluoroscopy    Provided History:  Intrathecal chemotherapy.    Procedure note: Verbal and written consent for lumbar puncture was  obtained from the patient, and benefits and risk of the procedure were  explained, including but not limited to worsening headache,  hemorrhage, infection, lower extremity pain, or nerve root injury. The  patient was sterilely prepped and draped with the patient in the prone  position, over the lower back. Under fluoroscopic guidance, the  interlaminar spaces were noted. 1% lidocaine was administered for  local anesthetic over  the L3-4 interlaminar space, and a 22 gauge 5.0  inch needle was advanced into the thecal sac under fluoroscopic  guidance.  There was initial show of clear CSF. Opening pressure was  not checked. Approximately 8 cc of CSF were collected.    Intrathecal chemotherapy was administered by heme/onc provider.    The needle was removed with the stylet in place. There was no  immediate complication associated with the procedure. Samples were  sent for the requested laboratory testing.      Estimated blood loss: Less than 1 cc.    Fluoroscopic time: 28 seconds      Impression    Impression: Successful lumbar puncture with intrathecal chemotherapy  administration without immediate complication.    I, SEA LACKEY MD, attest that I was present in the procedure room  for the entire procedure.    I have personally reviewed the examination and initial interpretation  and I agree with the findings.    SEA LACKEY MD   CBC with platelets differential   Result Value Ref Range    WBC 8.7 4.0 - 11.0 10e9/L    RBC Count 3.32 (L) 3.8 - 5.2 10e12/L    Hemoglobin 9.7 (L) 11.7 - 15.7 g/dL    Hematocrit 31.6 (L) 35.0 - 47.0 %    MCV 95 78 - 100 fl    MCH 29.2 26.5 - 33.0 pg    MCHC 30.7 (L) 31.5 - 36.5 g/dL    RDW 24.4 (H) 10.0 - 15.0 %    Platelet Count 296 150 - 450 10e9/L    Diff Method Automated Method     % Neutrophils 90.7 %    % Lymphocytes 4.6 %    % Monocytes 4.4 %    % Eosinophils 0.0 %    % Basophils 0.1 %    % Immature Granulocytes 0.2 %    Nucleated RBCs 0 0 /100    Absolute Neutrophil 7.8 1.6 - 8.3 10e9/L    Absolute Lymphocytes 0.4 (L) 0.8 - 5.3 10e9/L    Absolute Monocytes 0.4 0.0 - 1.3 10e9/L    Absolute Eosinophils 0.0 0.0 - 0.7 10e9/L    Absolute Basophils 0.0 0.0 - 0.2 10e9/L    Abs Immature Granulocytes 0.0 0 - 0.4 10e9/L    Absolute Nucleated RBC 0.0    Comprehensive metabolic panel   Result Value Ref Range    Sodium 143 133 - 144 mmol/L    Potassium 3.6 3.4 - 5.3 mmol/L    Chloride 108 94 - 109 mmol/L    Carbon  Dioxide 27 20 - 32 mmol/L    Anion Gap 8 3 - 14 mmol/L    Glucose 143 (H) 70 - 99 mg/dL    Urea Nitrogen 14 7 - 30 mg/dL    Creatinine 0.77 0.52 - 1.04 mg/dL    GFR Estimate 77 >60 mL/min/1.7m2    GFR Estimate If Black >90 >60 mL/min/1.7m2    Calcium 7.7 (L) 8.5 - 10.1 mg/dL    Bilirubin Total 0.6 0.2 - 1.3 mg/dL    Albumin 2.6 (L) 3.4 - 5.0 g/dL    Protein Total 5.5 (L) 6.8 - 8.8 g/dL    Alkaline Phosphatase 68 40 - 150 U/L    ALT 24 0 - 50 U/L    AST 18 0 - 45 U/L     *Note: Due to a large number of results and/or encounters for the requested time period, some results have not been displayed. A complete set of results can be found in Results Review.

## 2017-12-09 NOTE — PLAN OF CARE
Problem: Patient Care Overview  Goal: Plan of Care/Patient Progress Review  Outcome: No Change  Assumed care at 1900. Tachycardic, 120s, resolved overnight. Other VSS on RA, afebrile. Day 2 DA-R-EPOCH infusing via PICC, good blood return noted. Complaints of chest/back pain managed adequately with 30 mg oxy x2, scheduled pain regimen. PO compazine given x1 for nausea with relief. LP site C/D/I. Unit 2 of 2 PRBC completed, no signs/symptoms of reaction noted. Pt reported frustration that her chemo rate could not be adjusted overnight, but otherwise was in good spirits. Slept between cares. Voiding adequately. Continue with POC.

## 2017-12-09 NOTE — PLAN OF CARE
Problem: Patient Care Overview  Goal: Plan of Care/Patient Progress Review  Outcome: No Change  3541-0331 hours: Afebrile, vital signs stable. Complaints of back and chest pain. Scheduled pain medication given. Oxycodone given x 1. Compazine given x 1 for nausea with relief. Day 3 chemo currently infusing over 22 hours with good blood return q4h via port. Adequately voiding. Small bowel movement today. Stool softeners given for patient reports of constipation. Jobst stockings on. No blood or electrolyte replacement needed today. Eating well. Up in hallway independently.

## 2017-12-09 NOTE — PLAN OF CARE
Problem: Chemotherapy Effects (Adult)  Goal: Signs and Symptoms of Listed Potential Problems Will be Absent, Minimized or Managed (Chemotherapy Effects)  Signs and symptoms of listed potential problems will be absent, minimized or managed by discharge/transition of care (reference Chemotherapy Effects (Adult) CPG).     Day #2 Etoposide and Doxorubicin/Vincristine hung CIVI via left port with great blood return. Currently running over 23 hours as ordered. Pt states Dr. Alanis said the infusion could be increased to run over 22 hours. Dr. Alanis paged but didn't hear back- this happened after hours. Discussed with NP and told to remain at current ordered dose and could speak with Dr. Alanis in the am. Pt very angry and crying. She states she has no more sick time to use and wants to get back to work. Discussed with patient the increase rate would only get her discharged a few hours earlier then scheduled,  emotional support provided.

## 2017-12-09 NOTE — PLAN OF CARE
Problem: Patient Care Overview  Goal: Plan of Care/Patient Progress Review  Edema 7D: Patient fit with size XL, 20-30mmHg, Jobst compression stocking for further management of LE edema. 1+ pitting present in distal LEs with skin intact. Compression stockings to be worn during day time only and removed at night in order to protect skin integrity - see patient care order for details.

## 2017-12-10 ENCOUNTER — APPOINTMENT (OUTPATIENT)
Dept: OCCUPATIONAL THERAPY | Facility: CLINIC | Age: 57
End: 2017-12-10
Attending: INTERNAL MEDICINE
Payer: COMMERCIAL

## 2017-12-10 LAB
ALBUMIN SERPL-MCNC: 2.8 G/DL (ref 3.4–5)
ALP SERPL-CCNC: 71 U/L (ref 40–150)
ALT SERPL W P-5'-P-CCNC: 26 U/L (ref 0–50)
ANION GAP SERPL CALCULATED.3IONS-SCNC: 7 MMOL/L (ref 3–14)
AST SERPL W P-5'-P-CCNC: 18 U/L (ref 0–45)
BASOPHILS # BLD AUTO: 0 10E9/L (ref 0–0.2)
BASOPHILS NFR BLD AUTO: 0.3 %
BILIRUB SERPL-MCNC: 0.4 MG/DL (ref 0.2–1.3)
BUN SERPL-MCNC: 18 MG/DL (ref 7–30)
CALCIUM SERPL-MCNC: 7.5 MG/DL (ref 8.5–10.1)
CHLORIDE SERPL-SCNC: 107 MMOL/L (ref 94–109)
CO2 SERPL-SCNC: 30 MMOL/L (ref 20–32)
CREAT SERPL-MCNC: 0.68 MG/DL (ref 0.52–1.04)
DIFFERENTIAL METHOD BLD: ABNORMAL
EOSINOPHIL # BLD AUTO: 0 10E9/L (ref 0–0.7)
EOSINOPHIL NFR BLD AUTO: 0 %
ERYTHROCYTE [DISTWIDTH] IN BLOOD BY AUTOMATED COUNT: 23.2 % (ref 10–15)
GFR SERPL CREATININE-BSD FRML MDRD: 89 ML/MIN/1.7M2
GLUCOSE SERPL-MCNC: 129 MG/DL (ref 70–99)
HCT VFR BLD AUTO: 31.9 % (ref 35–47)
HGB BLD-MCNC: 10 G/DL (ref 11.7–15.7)
IMM GRANULOCYTES # BLD: 0 10E9/L (ref 0–0.4)
IMM GRANULOCYTES NFR BLD: 0.4 %
LYMPHOCYTES # BLD AUTO: 0.3 10E9/L (ref 0.8–5.3)
LYMPHOCYTES NFR BLD AUTO: 3.7 %
MCH RBC QN AUTO: 29.6 PG (ref 26.5–33)
MCHC RBC AUTO-ENTMCNC: 31.3 G/DL (ref 31.5–36.5)
MCV RBC AUTO: 94 FL (ref 78–100)
MONOCYTES # BLD AUTO: 0.3 10E9/L (ref 0–1.3)
MONOCYTES NFR BLD AUTO: 4.3 %
NEUTROPHILS # BLD AUTO: 6.8 10E9/L (ref 1.6–8.3)
NEUTROPHILS NFR BLD AUTO: 91.3 %
NRBC # BLD AUTO: 0 10*3/UL
NRBC BLD AUTO-RTO: 0 /100
PLATELET # BLD AUTO: 289 10E9/L (ref 150–450)
POTASSIUM SERPL-SCNC: 3.6 MMOL/L (ref 3.4–5.3)
PROT SERPL-MCNC: 5.7 G/DL (ref 6.8–8.8)
RBC # BLD AUTO: 3.38 10E12/L (ref 3.8–5.2)
SODIUM SERPL-SCNC: 144 MMOL/L (ref 133–144)
WBC # BLD AUTO: 7.5 10E9/L (ref 4–11)

## 2017-12-10 PROCEDURE — 25000125 ZZHC RX 250

## 2017-12-10 PROCEDURE — S0169 CALCITROL: HCPCS | Performed by: NURSE PRACTITIONER

## 2017-12-10 PROCEDURE — 25000128 H RX IP 250 OP 636: Performed by: PHYSICIAN ASSISTANT

## 2017-12-10 PROCEDURE — 25000132 ZZH RX MED GY IP 250 OP 250 PS 637: Performed by: PHYSICIAN ASSISTANT

## 2017-12-10 PROCEDURE — 36592 COLLECT BLOOD FROM PICC: CPT | Performed by: NURSE PRACTITIONER

## 2017-12-10 PROCEDURE — 80053 COMPREHEN METABOLIC PANEL: CPT | Performed by: NURSE PRACTITIONER

## 2017-12-10 PROCEDURE — 25000128 H RX IP 250 OP 636: Performed by: INTERNAL MEDICINE

## 2017-12-10 PROCEDURE — 40000133 ZZH STATISTIC OT WARD VISIT: Performed by: OCCUPATIONAL THERAPIST

## 2017-12-10 PROCEDURE — 99232 SBSQ HOSP IP/OBS MODERATE 35: CPT | Performed by: INTERNAL MEDICINE

## 2017-12-10 PROCEDURE — 25000132 ZZH RX MED GY IP 250 OP 250 PS 637: Performed by: NURSE PRACTITIONER

## 2017-12-10 PROCEDURE — 97535 SELF CARE MNGMENT TRAINING: CPT | Mod: GO | Performed by: OCCUPATIONAL THERAPIST

## 2017-12-10 PROCEDURE — 25000131 ZZH RX MED GY IP 250 OP 636 PS 637

## 2017-12-10 PROCEDURE — 85025 COMPLETE CBC W/AUTO DIFF WBC: CPT | Performed by: NURSE PRACTITIONER

## 2017-12-10 PROCEDURE — 12000008 ZZH R&B INTERMEDIATE UMMC

## 2017-12-10 RX ADMIN — DICLOFENAC 4 G: 10 GEL TOPICAL at 20:03

## 2017-12-10 RX ADMIN — FUROSEMIDE 20 MG: 20 TABLET ORAL at 20:03

## 2017-12-10 RX ADMIN — CROMOLYN SODIUM 1 DROP: 40 SOLUTION/ DROPS OPHTHALMIC at 20:03

## 2017-12-10 RX ADMIN — CALCITRIOL 0.25 MCG: 0.25 CAPSULE, LIQUID FILLED ORAL at 09:22

## 2017-12-10 RX ADMIN — DICLOFENAC 4 G: 10 GEL TOPICAL at 13:55

## 2017-12-10 RX ADMIN — MORPHINE SULFATE 30 MG: 30 TABLET, EXTENDED RELEASE ORAL at 06:12

## 2017-12-10 RX ADMIN — ALLOPURINOL 300 MG: 300 TABLET ORAL at 09:23

## 2017-12-10 RX ADMIN — HYDROCHLOROTHIAZIDE 12.5 MG: 12.5 CAPSULE ORAL at 09:23

## 2017-12-10 RX ADMIN — DIAZEPAM 5 MG: 5 TABLET ORAL at 09:23

## 2017-12-10 RX ADMIN — PREDNISONE 60 MG: 10 TABLET ORAL at 09:22

## 2017-12-10 RX ADMIN — DICLOFENAC 4 G: 10 GEL TOPICAL at 09:24

## 2017-12-10 RX ADMIN — CETIRIZINE HYDROCHLORIDE 10 MG: 10 TABLET, FILM COATED ORAL at 20:03

## 2017-12-10 RX ADMIN — DIAZEPAM 5 MG: 5 TABLET ORAL at 13:54

## 2017-12-10 RX ADMIN — CELECOXIB 200 MG: 200 CAPSULE ORAL at 09:23

## 2017-12-10 RX ADMIN — VITAMIN D, TAB 1000IU (100/BT) 5000 UNITS: 25 TAB at 09:22

## 2017-12-10 RX ADMIN — LEVOTHYROXINE SODIUM 336 MCG: 112 TABLET ORAL at 06:12

## 2017-12-10 RX ADMIN — MORPHINE SULFATE 30 MG: 30 TABLET, EXTENDED RELEASE ORAL at 12:28

## 2017-12-10 RX ADMIN — RANITIDINE 75 MG: 75 TABLET, FILM COATED ORAL at 20:03

## 2017-12-10 RX ADMIN — PREDNISONE 60 MG: 10 TABLET ORAL at 20:03

## 2017-12-10 RX ADMIN — ENOXAPARIN SODIUM 40 MG: 40 INJECTION SUBCUTANEOUS at 20:03

## 2017-12-10 RX ADMIN — MONTELUKAST SODIUM 20 MG: 10 TABLET, FILM COATED ORAL at 20:04

## 2017-12-10 RX ADMIN — RANITIDINE 75 MG: 75 TABLET, FILM COATED ORAL at 09:23

## 2017-12-10 RX ADMIN — ACYCLOVIR 400 MG: 400 TABLET ORAL at 09:23

## 2017-12-10 RX ADMIN — ETOPOSIDE 120 MG: 20 INJECTION, SOLUTION, CONCENTRATE INTRAVENOUS at 13:56

## 2017-12-10 RX ADMIN — ACYCLOVIR 400 MG: 400 TABLET ORAL at 20:04

## 2017-12-10 RX ADMIN — MONTELUKAST SODIUM 20 MG: 10 TABLET, FILM COATED ORAL at 09:23

## 2017-12-10 RX ADMIN — OXYCODONE HYDROCHLORIDE 30 MG: 30 TABLET ORAL at 20:17

## 2017-12-10 RX ADMIN — OXYCODONE HYDROCHLORIDE 30 MG: 30 TABLET ORAL at 11:14

## 2017-12-10 RX ADMIN — CROMOLYN SODIUM 1 DROP: 40 SOLUTION/ DROPS OPHTHALMIC at 09:24

## 2017-12-10 RX ADMIN — OXYCODONE HYDROCHLORIDE 30 MG: 30 TABLET ORAL at 06:56

## 2017-12-10 RX ADMIN — CYCLOBENZAPRINE HYDROCHLORIDE 10 MG: 10 TABLET, FILM COATED ORAL at 09:24

## 2017-12-10 RX ADMIN — METHOCARBAMOL 750 MG: 750 TABLET ORAL at 09:22

## 2017-12-10 RX ADMIN — CETIRIZINE HYDROCHLORIDE 10 MG: 10 TABLET, FILM COATED ORAL at 13:54

## 2017-12-10 RX ADMIN — ONDANSETRON HYDROCHLORIDE 16 MG: 8 TABLET, FILM COATED ORAL at 13:54

## 2017-12-10 RX ADMIN — LIDOCAINE: 50 OINTMENT TOPICAL at 20:03

## 2017-12-10 RX ADMIN — FERROUS SULFATE TAB 325 MG (65 MG ELEMENTAL FE) 325 MG: 325 (65 FE) TAB at 09:22

## 2017-12-10 RX ADMIN — METHOCARBAMOL 750 MG: 750 TABLET ORAL at 20:04

## 2017-12-10 RX ADMIN — DIAZEPAM 5 MG: 5 TABLET ORAL at 20:03

## 2017-12-10 RX ADMIN — OXYCODONE HYDROCHLORIDE 30 MG: 30 TABLET ORAL at 15:44

## 2017-12-10 RX ADMIN — FUROSEMIDE 20 MG: 20 TABLET ORAL at 09:23

## 2017-12-10 RX ADMIN — CROMOLYN SODIUM 1 DROP: 40 SOLUTION/ DROPS OPHTHALMIC at 13:55

## 2017-12-10 RX ADMIN — METHOCARBAMOL 750 MG: 750 TABLET ORAL at 12:28

## 2017-12-10 RX ADMIN — CALCITRIOL 0.5 MCG: 0.5 CAPSULE, LIQUID FILLED ORAL at 12:28

## 2017-12-10 RX ADMIN — RANITIDINE 75 MG: 75 TABLET, FILM COATED ORAL at 13:54

## 2017-12-10 RX ADMIN — CETIRIZINE HYDROCHLORIDE 10 MG: 10 TABLET, FILM COATED ORAL at 09:23

## 2017-12-10 RX ADMIN — FERROUS SULFATE TAB 325 MG (65 MG ELEMENTAL FE) 325 MG: 325 (65 FE) TAB at 18:16

## 2017-12-10 RX ADMIN — CYCLOBENZAPRINE HYDROCHLORIDE 10 MG: 10 TABLET, FILM COATED ORAL at 20:03

## 2017-12-10 RX ADMIN — CYCLOBENZAPRINE HYDROCHLORIDE 10 MG: 10 TABLET, FILM COATED ORAL at 13:54

## 2017-12-10 RX ADMIN — VINCRISTINE SULFATE 0.8 MG: 1 INJECTION, SOLUTION INTRAVENOUS at 13:56

## 2017-12-10 RX ADMIN — POTASSIUM CHLORIDE 20 MEQ: 750 TABLET, EXTENDED RELEASE ORAL at 09:23

## 2017-12-10 RX ADMIN — OXYCODONE HYDROCHLORIDE 30 MG: 30 TABLET ORAL at 00:49

## 2017-12-10 RX ADMIN — MORPHINE SULFATE 60 MG: 30 TABLET, EXTENDED RELEASE ORAL at 20:03

## 2017-12-10 RX ADMIN — BISACODYL 15 MG: 5 TABLET, COATED ORAL at 20:17

## 2017-12-10 RX ADMIN — CELECOXIB 200 MG: 200 CAPSULE ORAL at 20:03

## 2017-12-10 RX ADMIN — METHOCARBAMOL 750 MG: 750 TABLET ORAL at 15:44

## 2017-12-10 ASSESSMENT — PAIN DESCRIPTION - DESCRIPTORS
DESCRIPTORS: ACHING
DESCRIPTORS: ACHING
DESCRIPTORS: HEADACHE
DESCRIPTORS: ACHING
DESCRIPTORS: ACHING

## 2017-12-10 NOTE — PROGRESS NOTES
Nursing Focus: Chemotherapy    D: Positive blood return via port. Insertion site is clean/dry/intact, dressing intact with no complaints of pain.  Urine output is recorded in intake in Doc Flowsheet.    I: Premedications given per order (see electronic medical administration record). Dose #4 of Etoposide and Doxorubicin/Vincristine started to infuse over 22 hours. Reviewed pt teaching on chemotherapy side effects.  Pt denies need for further teaching. Chemotherapy double checked per protocol by two chemotherapy competent RN's.   A: Tolerating procedure well. Denies nausea and or pain.   P: Continue to monitor urine output and symptoms of nausea. Screen for symptoms of toxicity.

## 2017-12-10 NOTE — PLAN OF CARE
Problem: Patient Care Overview  Goal: Plan of Care/Patient Progress Review  Edema 7D: Recommend continued use of size XL, 20-30mmHg, Jobst compression garments for long term management of LE edema. Jobst stockings to be worn during the day time only and removed at night in order to protect skin integrity. Issued size G comperm compression stockings to be worn at night to provide gentle compression during skin break. Patient has met all established inpatient edema goals and is independent with edema program, mary ellen DUNHAM.

## 2017-12-10 NOTE — PROGRESS NOTES
"    [facility], [place]    Hematology / Oncology Progress Note    Date of Service (when I saw the patient): 12/10/2017     Assessment & Plan   Tisha Arias is a 57 year old female with high grade DLBCL and PMHx significant for breast cancer s/p bilateral mastectomies & BENJIE/BSO, papillary thyroid cancer s/p total thyroidectomy, chronic chest wall pain, muscle spasms, asthma and h/o Rachael en Y gastric bypass, who is admitted for Cycle 4 of DA-R-EPOCH.    HEME/ONC  # High grade B cell lymphoma. \"Double hit-like\". Primary is Dr. Sauceda. Patient diagnosed August 2017. Lytic lesion of right 10th rib with extension. Disease localized above the diaphragm in thoracic and paravertebral soft tissue and mediastinal lymphadenopathy. 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 Staging LP and BMBx were negative for lymphoma. Initiated cycle 1 DA-R-EPOCH on 10/6/17. Has completed through 3 cycles. She now presents for cycle 4 DA-R-EPOCH with 20% dose increase.  -Port-a-cath in place      Treatment plan: Cycle 4 DA-R-EPOCH (Day 1=12/7/17).  Today is Day 4. Chemo rate was adjusted q23hr to q22hr as d/w Dr. Alanis.   -Rituxan 750 mg (Day 1)  -Prednisone 60 mg  (Day 1-5)  -Etoposide 120 mg CIVI (Day 1-4)  -Vincristine 0.8 mg/Doxorubicin 24mg CIVI (Day 1-4)  -Cyclophosphamide 1800mg (Day 5)  -LP with IT chemotherapy completed 12/8 (Xray guided). CSF WBC 0, flow pending  -Premeds: Tylenol and benadryl prior to Rituxan, Zofran (D1-5), Emend (d5), Dex (D6-7).  -Neulasta 24-72 hours after chemo. This has been arranged for Wed, 12/13.   -Discharge with D6-7 Dexamethasone      #Anemia. 2/2 disease and chemotherapy. Hgb 8 on 12/8 AM, pt  hypotensive. Otherwise asymptomatic. S/p 2U PRBCs on 12/8.   - While inpatient, change transfusion parameters to keep Hgb >8, Plts >10K.   - At " hospital discharge, continue to monitor with twice weekly labs and transfuse if Hgb<8 and Plt<10k.       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 Follows with Dr. Crawley. Diagnosed in 2011. Oncotype recurrence score of 11 (7% recurrence risk after 5 years of tamoxifen). S/p bilateral mastectomies and BENJIE/BSO in 2012. - Was initially on Arimidex (1027-9344) but changed to Tamoxifen due to arthralgias. Tamoxifen held since 10/5 and continue to hold with chemotherapy. Last f/u Dr. Crawley was on 11/27/17.      # Papillary thyroid cancer. Follows with Dr. Castro. Diagnosed in 2013. S/p total thyroidectomy and CND. Received ETOH treatment August 2014, October 2014 and December 2014. Has post-surgical hypothyroidism.  -Continue PTA Levothyroxine.  -Continue PTA calcitriol.      GI  # H/o Rachael en Y gastric bypass. Likely affecting absorption, thus patient is on multiple supplements.  - Continue supplements (calcium citrate-vitamin D, vitamin D3, magnesium citrate). Also has iron deficiency anemia likely from poor absorption and per Dr. Sauceda, she should continue 325mg PO ferrous sulfate TID.       Pain  # Chronic chest wall pain after mastectomy.   -Continue MS Contin 30/30/60 mg TID  -Oxycodone 15-30 mg q4hrs prn.  -Celebrex 200 BID.   -Lidocaine cream prn.      #Chronic muscle cramps.   -Continue KCl 20 mEq daily, Robaxin 750 mg QID, Valium 5 mg TID, Flexeril prn.      ID  #Anti-infectives.  -Continue PTA acyclovir.  -Will hold Fluconazole/Levaquin ppx as patient is not neutropenic. To resume on discharge or when ANC <1000.      CV  #Lymphedema of lower extremities.  -Worsening BLE edema; ECHO done 12/7 with EF 55-60%. BLE doppler US negative for DVTs. Chronic issue  -Continue PTA HCTZ 12.5mg daily  -continue PTA Lasix 20mg PO BID (hold if SBP<110 or DBP<60 given recent hypotension)     #Hypotension. RESOLVED.  Developed asymptomatic early morning of 12/8. S/p 1L NS overnight and held HCTZ/Lasix on  12/8 AM. Improved s/p fluid and PRBC support. Now normotensive.      PULM  #Asthma, allergies.   -Continue PTA singular, zyrtec.  -Patient stated she is not taking her inhalers, not entered      FEN  -IVF per chemo regimen  -PTA electrolyte replacements and lyte protocol  -RDAT      PPx  -VTE: resumed ppx Lovenox on 12/9 (after holding prior to 12/8 LP)  -PUD: PTA ranitidine  -Bowels: PTA docusate and miralax       Dispo: Pending completion of chemotherapy and resolution of any acute toxicities. Anticipate discharge after completion of chemo on Day 5 (Monday, 12/11).   - pt has close f/u for labs, Neulasta, and SANJANA on 12/13  - labs arranged for 12/15, 12/19  - PET/CT on 12/22. Pt believes she is also due to have MRI; will send message to clinic team regarding this.  - f/u with Dr. Sauceda currently scheduled on 12/26. Pt also concerned that her schedule is due to start early in the week (which means she has to take more time off from work). However, it looks like the schedule in [facility] is not due until 12/28. Thus, 12/26 is likely just her appt with Dr. Sauceda. I will send message to the clinic team to confirm this as well.       Above plan discussed with staff physician, Dr. Alanis.      Charlee Becerra PA-C  Hematology/Oncology  [phone]    Interval History   No events overnight, afebrile. Chemo rate slightly adjusted yesterday so now running over 22 hrs. Intermittently tearful today in talking about her disease, her chronic pain, the process of chemo and this regimen, and her continuing to work throughout her treatment. She reports ongoing chest wall pain from her mastectomy. Would like to be offered pain medication throughout the night, even if she is sleeping, to try to stay ahead of the pain. Ongoing leg swelling and weigh up a little more again today. She was able to get her compression socks on this morning, so continues to wear those. Denies HA, SOB, or nausea at this time. Has some questions about  her follow-up imaging and next cycle.     Physical Exam   Temp: 96.7  F (35.9  C) Temp src: Oral BP: 119/64 Pulse: 91 Heart Rate: 80 Resp: 16 SpO2: 96 % O2 Device: None (Room air)    Vitals:    12/08/17 0952 12/09/17 0835 12/10/17 0747   Weight: 89.8 kg (198 lb) 88.7 kg (195 lb 9.6 oz) 89.4 kg (197 lb 1.6 oz)     Vital Signs with Ranges  Temp:  [95.9  F (35.5  C)-98.2  F (36.8  C)] 96.7  F (35.9  C)  Pulse:  [85-91] 91  Heart Rate:  [75-80] 80  Resp:  [16-18] 16  BP: (109-131)/(55-72) 119/64  SpO2:  [92 %-96 %] 96 %  I/O last 3 completed shifts:  In: 3758.2 [P.O.:2875; IV Piggyback:883.2]  Out: 3450 [Urine:3450]     Constitutional: Pleasant female seen sitting at EOB, eating breakfast. Alert, interactive. NAD. Intermittently tearful during our discussion.  HEENT: NC/AT. MMM.   Respiratory: No increased work of breathing, good air exchange, CTAB  GI: +BS. Soft. Non-tender.   Skin: Clean, dry, intact.   Extremities: B/l lower extremity edema, compression stockings in place. Difficult to fully assess throughout stockings but appears to be +1-2.  Neurologic: Alert and oriented. Grossly nonfocal. Speech normal.   Vascular access: Port, c/d/i    Medications     - MEDICATION INSTRUCTIONS -       - MEDICATION INSTRUCTIONS -       NaCl         etoposide (TOPOSAR) chemo infusion  60 mg/m2 (Treatment Plan Recorded) Intravenous Q24H     vinCRIStine/DOXOrubicin (ONCOVIN/ADRIAMYCIN) chemo infusion  0.4 mg/m2 (Treatment Plan Recorded) Intravenous Q24H     enoxaparin  40 mg Subcutaneous Q24H     heparin lock flush  5-10 mL Intracatheter Q24H     heparin  5 mL Intracatheter Q28 Days     ondansetron  16 mg Oral Q24H     [START ON 12/11/2017] fosaprepitant (EMEND) 150 mg intermittent infusion  150 mg Intravenous Once     [START ON 12/12/2017] dexamethasone  8 mg Oral Daily     predniSONE  30 mg/m2 (Treatment Plan Recorded) Oral BID     Chemotherapy Infusing-Continuous Infusion   Does not apply Q8H     [START ON 12/11/2017]  cyclophosphamide (CYTOXAN) chemo infusion  1,800 mg Intravenous Once     [START ON 12/12/2017] filgrastim (NEUPOGEN/GRANIX) intravenous  5 mcg/kg (Treatment Plan Recorded) Intravenous Daily at 8 pm     acyclovir  400 mg Oral BID     allopurinol  300 mg Oral Daily     calcitRIOL  0.5 mcg Oral Daily with lunch     calcium citrate-vitamin D  2 tablet Oral Once per day on Mon Thu     celecoxib  200 mg Oral BID     cetirizine  10 mg Oral TID     cholecalciferol  5,000 Units Oral Daily     cromolyn  1 drop Both Eyes 4x Daily     cyclobenzaprine  10 mg Oral TID     diazepam  5 mg Oral TID     diclofenac  4 g Topical 4x Daily     ferrous sulfate  325 mg Oral TID w/meals     furosemide  20 mg Oral BID     hydrochlorothiazide  12.5 mg Oral Daily     levothyroxine  224 mcg Oral Once per day on Mon Tue Wed Thu Fri Sat     methocarbamol  750 mg Oral 4x Daily     montelukast  20 mg Oral BID     morphine  30 mg Oral BID     potassium chloride SA  20 mEq Oral Daily     ranitidine  75 mg Oral TID     senna-docusate  1-2 tablet Oral QPM     calcitRIOL  0.25 mcg Oral QAM     morphine (MS CONTIN) 12 hr tablet 60 mg  60 mg Oral QPM     levothyroxine  336 mcg Oral Weekly       Data   Results for orders placed or performed during the hospital encounter of 12/07/17 (from the past 24 hour(s))   CBC with platelets differential   Result Value Ref Range    WBC 7.5 4.0 - 11.0 10e9/L    RBC Count 3.38 (L) 3.8 - 5.2 10e12/L    Hemoglobin 10.0 (L) 11.7 - 15.7 g/dL    Hematocrit 31.9 (L) 35.0 - 47.0 %    MCV 94 78 - 100 fl    MCH 29.6 26.5 - 33.0 pg    MCHC 31.3 (L) 31.5 - 36.5 g/dL    RDW 23.2 (H) 10.0 - 15.0 %    Platelet Count 289 150 - 450 10e9/L    Diff Method Automated Method     % Neutrophils 91.3 %    % Lymphocytes 3.7 %    % Monocytes 4.3 %    % Eosinophils 0.0 %    % Basophils 0.3 %    % Immature Granulocytes 0.4 %    Nucleated RBCs 0 0 /100    Absolute Neutrophil 6.8 1.6 - 8.3 10e9/L    Absolute Lymphocytes 0.3 (L) 0.8 - 5.3 10e9/L     Absolute Monocytes 0.3 0.0 - 1.3 10e9/L    Absolute Eosinophils 0.0 0.0 - 0.7 10e9/L    Absolute Basophils 0.0 0.0 - 0.2 10e9/L    Abs Immature Granulocytes 0.0 0 - 0.4 10e9/L    Absolute Nucleated RBC 0.0    Comprehensive metabolic panel   Result Value Ref Range    Sodium 144 133 - 144 mmol/L    Potassium 3.6 3.4 - 5.3 mmol/L    Chloride 107 94 - 109 mmol/L    Carbon Dioxide 30 20 - 32 mmol/L    Anion Gap 7 3 - 14 mmol/L    Glucose 129 (H) 70 - 99 mg/dL    Urea Nitrogen 18 7 - 30 mg/dL    Creatinine 0.68 0.52 - 1.04 mg/dL    GFR Estimate 89 >60 mL/min/1.7m2    GFR Estimate If Black >90 >60 mL/min/1.7m2    Calcium 7.5 (L) 8.5 - 10.1 mg/dL    Bilirubin Total 0.4 0.2 - 1.3 mg/dL    Albumin 2.8 (L) 3.4 - 5.0 g/dL    Protein Total 5.7 (L) 6.8 - 8.8 g/dL    Alkaline Phosphatase 71 40 - 150 U/L    ALT 26 0 - 50 U/L    AST 18 0 - 45 U/L     *Note: Due to a large number of results and/or encounters for the requested time period, some results have not been displayed. A complete set of results can be found in Results Review.

## 2017-12-10 NOTE — PLAN OF CARE
Problem: Patient Care Overview  Goal: Plan of Care/Patient Progress Review  Outcome: No Change  Assumed care at 1900. AVSS on RA. Complaints of lower back/chest pain managed with 30 mg PO oxy x3, scheduled pain regimen. Complaints of nausea managed with 10 mg IV compazine x1. Day 3 DA-R-EPOCH infusing over 22 hours, good blood return noted. Voiding adequately. Stool softeners given per pt request. Jobst stockings off; encouraged continued ambulation.  Up independently in room. Continue with POC.

## 2017-12-10 NOTE — PLAN OF CARE
Afebrile, vital signs are stable. Denies nausea. Complaints of back and chest pain relieved by oxycodone 30 mg po q4h and scheduled pain medication regimen. On day 4 DA-R-EPOCH. Etoposide currently infusing at 25.3 mL/hr and Doxorubicin/Vincristine infusing at 48.3 mL/hr with good blood return q4h. BLE edema stable. Jobst stockings currently in place. Up in room and hallway independently. Eating well. Plan to discharge tomorrow afternoon when chemo is complete.

## 2017-12-11 VITALS
HEART RATE: 125 BPM | OXYGEN SATURATION: 95 % | BODY MASS INDEX: 33.28 KG/M2 | DIASTOLIC BLOOD PRESSURE: 67 MMHG | WEIGHT: 194.9 LBS | TEMPERATURE: 97.1 F | HEIGHT: 64 IN | SYSTOLIC BLOOD PRESSURE: 133 MMHG | RESPIRATION RATE: 20 BRPM

## 2017-12-11 LAB
ALBUMIN SERPL-MCNC: 2.6 G/DL (ref 3.4–5)
ALP SERPL-CCNC: 54 U/L (ref 40–150)
ALT SERPL W P-5'-P-CCNC: 24 U/L (ref 0–50)
ANION GAP SERPL CALCULATED.3IONS-SCNC: 12 MMOL/L (ref 3–14)
AST SERPL W P-5'-P-CCNC: 15 U/L (ref 0–45)
BASOPHILS # BLD AUTO: 0 10E9/L (ref 0–0.2)
BASOPHILS NFR BLD AUTO: 0.2 %
BILIRUB SERPL-MCNC: 0.7 MG/DL (ref 0.2–1.3)
BUN SERPL-MCNC: 17 MG/DL (ref 7–30)
CALCIUM SERPL-MCNC: 7.6 MG/DL (ref 8.5–10.1)
CHLORIDE SERPL-SCNC: 102 MMOL/L (ref 94–109)
CO2 SERPL-SCNC: 29 MMOL/L (ref 20–32)
COPATH REPORT: NORMAL
CREAT SERPL-MCNC: 0.64 MG/DL (ref 0.52–1.04)
DIFFERENTIAL METHOD BLD: ABNORMAL
EOSINOPHIL # BLD AUTO: 0 10E9/L (ref 0–0.7)
EOSINOPHIL NFR BLD AUTO: 0 %
ERYTHROCYTE [DISTWIDTH] IN BLOOD BY AUTOMATED COUNT: 22.1 % (ref 10–15)
GFR SERPL CREATININE-BSD FRML MDRD: >90 ML/MIN/1.7M2
GLUCOSE SERPL-MCNC: 129 MG/DL (ref 70–99)
HCT VFR BLD AUTO: 30.2 % (ref 35–47)
HGB BLD-MCNC: 9.6 G/DL (ref 11.7–15.7)
IMM GRANULOCYTES # BLD: 0 10E9/L (ref 0–0.4)
IMM GRANULOCYTES NFR BLD: 0.2 %
LYMPHOCYTES # BLD AUTO: 0.3 10E9/L (ref 0.8–5.3)
LYMPHOCYTES NFR BLD AUTO: 5.3 %
MCH RBC QN AUTO: 29.8 PG (ref 26.5–33)
MCHC RBC AUTO-ENTMCNC: 31.8 G/DL (ref 31.5–36.5)
MCV RBC AUTO: 94 FL (ref 78–100)
MONOCYTES # BLD AUTO: 0.1 10E9/L (ref 0–1.3)
MONOCYTES NFR BLD AUTO: 1.5 %
NEUTROPHILS # BLD AUTO: 5.5 10E9/L (ref 1.6–8.3)
NEUTROPHILS NFR BLD AUTO: 92.8 %
NRBC # BLD AUTO: 0 10*3/UL
NRBC BLD AUTO-RTO: 0 /100
PLATELET # BLD AUTO: 298 10E9/L (ref 150–450)
POTASSIUM SERPL-SCNC: 3.3 MMOL/L (ref 3.4–5.3)
PROT SERPL-MCNC: 5.3 G/DL (ref 6.8–8.8)
RBC # BLD AUTO: 3.22 10E12/L (ref 3.8–5.2)
SODIUM SERPL-SCNC: 142 MMOL/L (ref 133–144)
WBC # BLD AUTO: 5.9 10E9/L (ref 4–11)

## 2017-12-11 PROCEDURE — 99238 HOSP IP/OBS DSCHRG MGMT 30/<: CPT | Performed by: INTERNAL MEDICINE

## 2017-12-11 PROCEDURE — 25000125 ZZHC RX 250

## 2017-12-11 PROCEDURE — 85025 COMPLETE CBC W/AUTO DIFF WBC: CPT | Performed by: NURSE PRACTITIONER

## 2017-12-11 PROCEDURE — 25000131 ZZH RX MED GY IP 250 OP 636 PS 637

## 2017-12-11 PROCEDURE — 25000132 ZZH RX MED GY IP 250 OP 250 PS 637: Performed by: NURSE PRACTITIONER

## 2017-12-11 PROCEDURE — 25000128 H RX IP 250 OP 636: Performed by: NURSE PRACTITIONER

## 2017-12-11 PROCEDURE — 25000128 H RX IP 250 OP 636

## 2017-12-11 PROCEDURE — 25000132 ZZH RX MED GY IP 250 OP 250 PS 637: Performed by: PHYSICIAN ASSISTANT

## 2017-12-11 PROCEDURE — 25000128 H RX IP 250 OP 636: Performed by: PHYSICIAN ASSISTANT

## 2017-12-11 PROCEDURE — 36592 COLLECT BLOOD FROM PICC: CPT | Performed by: NURSE PRACTITIONER

## 2017-12-11 PROCEDURE — 80053 COMPREHEN METABOLIC PANEL: CPT | Performed by: NURSE PRACTITIONER

## 2017-12-11 RX ORDER — ACETAMINOPHEN 325 MG/1
650 TABLET ORAL EVERY 4 HOURS PRN
Qty: 100 TABLET | COMMUNITY
Start: 2017-12-11

## 2017-12-11 RX ORDER — ACYCLOVIR 400 MG/1
400 TABLET ORAL 2 TIMES DAILY
Qty: 60 TABLET | Refills: 0 | Status: ON HOLD | OUTPATIENT
Start: 2017-12-11 | End: 2018-01-22

## 2017-12-11 RX ORDER — DEXAMETHASONE 4 MG/1
8 TABLET ORAL DAILY
Qty: 4 TABLET | Refills: 0 | Status: ON HOLD | OUTPATIENT
Start: 2017-12-13 | End: 2018-01-01

## 2017-12-11 RX ORDER — LEVOFLOXACIN 250 MG/1
250 TABLET, FILM COATED ORAL DAILY
Qty: 30 TABLET | Refills: 0 | Status: ON HOLD | OUTPATIENT
Start: 2017-12-11 | End: 2018-01-22

## 2017-12-11 RX ORDER — FERROUS SULFATE 325(65) MG
325 TABLET ORAL
Qty: 90 TABLET | Refills: 0 | Status: ON HOLD | COMMUNITY
Start: 2017-12-11 | End: 2018-01-19

## 2017-12-11 RX ORDER — LEVOTHYROXINE SODIUM 112 UG/1
336 TABLET ORAL WEEKLY
Qty: 30 TABLET | Status: ON HOLD
Start: 2017-12-17 | End: 2018-01-22

## 2017-12-11 RX ORDER — ALLOPURINOL 300 MG/1
300 TABLET ORAL DAILY
Qty: 30 TABLET | Refills: 0 | Status: ON HOLD | OUTPATIENT
Start: 2017-12-11 | End: 2018-01-22

## 2017-12-11 RX ORDER — ONDANSETRON 8 MG/1
8 TABLET, ORALLY DISINTEGRATING ORAL EVERY 8 HOURS PRN
Qty: 30 TABLET | Refills: 0 | Status: SHIPPED | OUTPATIENT
Start: 2017-12-11 | End: 2018-01-03

## 2017-12-11 RX ORDER — CYCLOBENZAPRINE HCL 10 MG
10 TABLET ORAL 3 TIMES DAILY PRN
Qty: 30 TABLET | Refills: 0 | Status: ON HOLD | OUTPATIENT
Start: 2017-12-11 | End: 2018-01-22

## 2017-12-11 RX ORDER — CETIRIZINE HYDROCHLORIDE 10 MG/1
10 TABLET ORAL 3 TIMES DAILY
Qty: 30 TABLET | Refills: 0 | Status: ON HOLD | OUTPATIENT
Start: 2017-12-11 | End: 2018-01-22

## 2017-12-11 RX ORDER — POLYDEXTROSE 1.5 G
5 TABLET,CHEWABLE ORAL DAILY
COMMUNITY
Start: 2017-12-11 | End: 2018-01-24

## 2017-12-11 RX ORDER — PREDNISONE 20 MG/1
30 TABLET ORAL 2 TIMES DAILY
Qty: 6 TABLET | Refills: 0 | Status: SHIPPED | OUTPATIENT
Start: 2017-12-11 | End: 2017-12-12

## 2017-12-11 RX ORDER — FLUCONAZOLE 200 MG/1
200 TABLET ORAL DAILY
Qty: 30 TABLET | Refills: 0 | Status: ON HOLD | OUTPATIENT
Start: 2017-12-11 | End: 2018-01-22

## 2017-12-11 RX ADMIN — POTASSIUM CHLORIDE 20 MEQ: 750 TABLET, EXTENDED RELEASE ORAL at 09:31

## 2017-12-11 RX ADMIN — RANITIDINE 75 MG: 75 TABLET, FILM COATED ORAL at 13:47

## 2017-12-11 RX ADMIN — CELECOXIB 200 MG: 200 CAPSULE ORAL at 09:28

## 2017-12-11 RX ADMIN — METHOCARBAMOL 750 MG: 750 TABLET ORAL at 09:26

## 2017-12-11 RX ADMIN — CETIRIZINE HYDROCHLORIDE 10 MG: 10 TABLET, FILM COATED ORAL at 13:47

## 2017-12-11 RX ADMIN — FERROUS SULFATE TAB 325 MG (65 MG ELEMENTAL FE) 325 MG: 325 (65 FE) TAB at 09:30

## 2017-12-11 RX ADMIN — MORPHINE SULFATE 30 MG: 30 TABLET, EXTENDED RELEASE ORAL at 11:59

## 2017-12-11 RX ADMIN — ONDANSETRON HYDROCHLORIDE 16 MG: 8 TABLET, FILM COATED ORAL at 11:59

## 2017-12-11 RX ADMIN — OXYCODONE HYDROCHLORIDE 30 MG: 30 TABLET ORAL at 00:40

## 2017-12-11 RX ADMIN — FUROSEMIDE 20 MG: 20 TABLET ORAL at 09:28

## 2017-12-11 RX ADMIN — DIAZEPAM 5 MG: 5 TABLET ORAL at 09:27

## 2017-12-11 RX ADMIN — ALLOPURINOL 300 MG: 300 TABLET ORAL at 09:31

## 2017-12-11 RX ADMIN — DICLOFENAC 4 G: 10 GEL TOPICAL at 12:21

## 2017-12-11 RX ADMIN — POTASSIUM CHLORIDE 40 MEQ: 750 TABLET, EXTENDED RELEASE ORAL at 12:00

## 2017-12-11 RX ADMIN — RANITIDINE 75 MG: 75 TABLET, FILM COATED ORAL at 09:28

## 2017-12-11 RX ADMIN — Medication 2 TABLET: at 09:28

## 2017-12-11 RX ADMIN — CALCITRIOL 0.5 MCG: 0.5 CAPSULE, LIQUID FILLED ORAL at 11:59

## 2017-12-11 RX ADMIN — FERROUS SULFATE TAB 325 MG (65 MG ELEMENTAL FE) 325 MG: 325 (65 FE) TAB at 11:59

## 2017-12-11 RX ADMIN — DICLOFENAC 4 G: 10 GEL TOPICAL at 09:26

## 2017-12-11 RX ADMIN — MONTELUKAST SODIUM 20 MG: 10 TABLET, FILM COATED ORAL at 09:30

## 2017-12-11 RX ADMIN — OXYCODONE HYDROCHLORIDE 30 MG: 30 TABLET ORAL at 13:52

## 2017-12-11 RX ADMIN — METHOCARBAMOL 750 MG: 750 TABLET ORAL at 12:00

## 2017-12-11 RX ADMIN — CYCLOBENZAPRINE HYDROCHLORIDE 10 MG: 10 TABLET, FILM COATED ORAL at 09:28

## 2017-12-11 RX ADMIN — POTASSIUM CHLORIDE 20 MEQ: 750 TABLET, EXTENDED RELEASE ORAL at 09:26

## 2017-12-11 RX ADMIN — CROMOLYN SODIUM 1 DROP: 40 SOLUTION/ DROPS OPHTHALMIC at 09:27

## 2017-12-11 RX ADMIN — DIAZEPAM 5 MG: 5 TABLET ORAL at 13:47

## 2017-12-11 RX ADMIN — ACYCLOVIR 400 MG: 400 TABLET ORAL at 09:30

## 2017-12-11 RX ADMIN — SODIUM CHLORIDE 150 MG: 9 INJECTION, SOLUTION INTRAVENOUS at 11:59

## 2017-12-11 RX ADMIN — CYCLOBENZAPRINE HYDROCHLORIDE 10 MG: 10 TABLET, FILM COATED ORAL at 13:47

## 2017-12-11 RX ADMIN — CETIRIZINE HYDROCHLORIDE 10 MG: 10 TABLET, FILM COATED ORAL at 09:31

## 2017-12-11 RX ADMIN — PREDNISONE 60 MG: 10 TABLET ORAL at 09:30

## 2017-12-11 RX ADMIN — SODIUM CHLORIDE, PRESERVATIVE FREE 5 ML: 5 INJECTION INTRAVENOUS at 13:48

## 2017-12-11 RX ADMIN — OXYCODONE HYDROCHLORIDE 30 MG: 30 TABLET ORAL at 09:50

## 2017-12-11 RX ADMIN — VITAMIN D, TAB 1000IU (100/BT) 5000 UNITS: 25 TAB at 09:26

## 2017-12-11 RX ADMIN — HYDROCHLOROTHIAZIDE 12.5 MG: 12.5 CAPSULE ORAL at 09:27

## 2017-12-11 RX ADMIN — LEVOTHYROXINE SODIUM 224 MCG: 112 TABLET ORAL at 06:30

## 2017-12-11 RX ADMIN — MORPHINE SULFATE 30 MG: 30 TABLET, EXTENDED RELEASE ORAL at 06:26

## 2017-12-11 RX ADMIN — CYCLOPHOSPHAMIDE 1800 MG: 2 INJECTION, POWDER, FOR SOLUTION INTRAVENOUS; ORAL at 12:33

## 2017-12-11 RX ADMIN — OXYCODONE HYDROCHLORIDE 30 MG: 30 TABLET ORAL at 04:49

## 2017-12-11 RX ADMIN — CROMOLYN SODIUM 1 DROP: 40 SOLUTION/ DROPS OPHTHALMIC at 12:21

## 2017-12-11 RX ADMIN — CALCITRIOL 0.25 MCG: 0.25 CAPSULE, LIQUID FILLED ORAL at 09:27

## 2017-12-11 ASSESSMENT — PAIN DESCRIPTION - DESCRIPTORS
DESCRIPTORS: ACHING

## 2017-12-11 NOTE — PLAN OF CARE
Problem: Patient Care Overview  Goal: Plan of Care/Patient Progress Review  Outcome: No Change  00:00-07:00 am  AF Stable soft 's/60's per baseline  OVSS  CIVI day 4 Doxorubicin/Vincristine and Etoposide continue infusing over 22 hrs  Tolerating chemo well thus far  No N/V  Chronic chest wall and lower back pain adequately controlled by current scheduled pain regimen with prn Oxycodone 30 mg po q 4 hrs per pt request  Pt appears sleepy but easily aroused  No respiratory issues  Up independently  Voiding well  Good urine output  No acute events overnight  Anticipate D/C today after chemo completion around noon  Continue w/POC

## 2017-12-11 NOTE — PROGRESS NOTES
Focus: chemo Day 5 Cytoxan  D: Pt received day 5 chemo of cytoxan in her port & infuse over 1 hour.  Positive blood return from port line.   I: Premed Emend given.  A: Pt tolerated cytoxan infusion fine.   P:Discharge after completion of chemo    Focus: Discharge Note  D/I: Pt completed her chemotherapy treatment this today. Reviewed discharge instructions and medication with pt. VSS.  Implanted port deaccessed.   A: Pt tolerated chemo infusion fine.   P: Discharge to home.

## 2017-12-11 NOTE — PLAN OF CARE
Problem: Patient Care Overview  Goal: Plan of Care/Patient Progress Review  Outcome: No Change  [id number]: Tachycardic, other VSS on RA. Pt complaint of headache, back/chest pain, managed adequately with 30 mg PO oxy x1, scheduled pain regimen. Day 4 DA-R-EPOCH infusing with good blood return. Denies nausea, appetite good. Jobst stockings off. BM x2 this evening, hard, continue with stool softeners. Voiding adequately. Plan for discharge after completion of chemo tomorrow (12/11). Continue with POC.

## 2017-12-11 NOTE — DISCHARGE SUMMARY
"Discharge Summary -- Hematology / Oncology  Date of Admission:  12/7/2017  Date of Discharge:  12/11/2017  Discharging Provider: Merlene Green  Date of Service (when I saw the patient): 12/11/17    Discharge Diagnoses   Active Problems:    DLBCL (diffuse large B cell lymphoma) (H)    History of Present Illness  Tisha Arias is a 57 year old female with high grade DLBCL and PMHx significant for breast cancer s/p bilateral mastectomies & BENJIE/BSO, papillary thyroid cancer s/p total thyroidectomy, chronic chest wall pain, muscle spasms, asthma and h/o Rachael en Y gastric bypass, who is admitted for Cycle 4 of DA-R-EPOCH. \"Double hit-like\". Primary is Dr. Sauceda. Patient diagnosed August 2017. Lytic lesion of right 10th rib with extension. Disease localized above the diaphragm in thoracic and paravertebral soft tissue and mediastinal lymphadenopathy. 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 Staging LP and BMBx were negative for lymphoma. Initiated cycle 1 DA-R-EPOCH on 10/6/17. Has completed through 3 cycles. She now presents for cycle 4 DA-R-EPOCH with 20% dose increase.    Hospital Course  Tisha Arias is a 57 year old female with high grade DLBCL and PMHx significant for breast cancer s/p bilateral mastectomies & BENJIE/BSO, papillary thyroid cancer s/p total thyroidectomy, chronic chest wall pain, muscle spasms, asthma and h/o Rachael en Y gastric bypass, who is admitted for Cycle 4 of DA-R-EPOCH. She tolerated chemotherapy well. Had some issues with hypotension that resolved with PRBC (on 12/8) & fluids. Able to resume home lasix r/t edema prior to d/c. Close f/u scheduled per patient request. Reviewed meds extensively with patient at time of d/c. Did send out with refills for anti-infective regimen (fluc, levaquin, acy). Will complete her prednisone tomorrow AM, and " start decadron for D6 & 7 on 12/13/17.    Problem List:    HEME/ONC  # High grade B cell lymphoma.    Treatment plan: Cycle 4 DA-R-EPOCH (Day 1=12/7/17).    -D 5 today, tolerated chemotherapy well.   -LP with IT chemotherapy completed 12/8 (Xray guided). CSF WBC 0, no definitive B cell population on flow.  -Neulasta 24-72 hours after chemo. This has been arranged for Wed, 12/13.   -Discharge with D6-7 Dexamethasone.       #Anemia. 2/2 disease and chemotherapy. Hgb 8 on 12/8 AM, pt  hypotensive. Otherwise asymptomatic. S/p 2U PRBCs on 12/8.   - While inpatient, change transfusion parameters to keep Hgb >8, Plts >10K.   - At hospital discharge, continue to monitor with twice weekly labs and transfuse if Hgb<8 and Plt<10k.       #Stage 1, ER/OK-positive, HER2-negative breast cancer. Follows with Dr. Crawley. Diagnosed in 2011. Oncotype recurrence score of 11 (7% recurrence risk after 5 years of tamoxifen). S/p bilateral mastectomies and BENJIE/BSO in 2012. - Was initially on Arimidex ([id number]) but changed to Tamoxifen due to arthralgias. Tamoxifen held since 10/5 and continue to hold with chemotherapy. Last f/u Dr. Crawley was on 11/27/17.      # Papillary thyroid cancer. Follows with Dr. Castro. Diagnosed in 2013. S/p total thyroidectomy and CND. Received ETOH treatment August 2014, October 2014 and December 2014. Has post-surgical hypothyroidism.  -Continue PTA Levothyroxine.  -Continue PTA calcitriol.      GI  # H/o Rachael en Y gastric bypass. Likely affecting absorption, thus patient is on multiple supplements.  - Continue supplements (calcium citrate-vitamin D, vitamin D3, magnesium citrate). Also has iron deficiency anemia likely from poor absorption and per Dr. Suaceda, she should continue 325mg PO ferrous sulfate TID.       Pain  # Chronic chest wall pain after mastectomy.   -Continue MS Contin 30/30/60 mg TID.  -Oxycodone 15-30 mg q4hrs prn.  -Celebrex 200 BID.   -Lidocaine cream prn.      #Chronic muscle  cramps.   -Continue KCl 20 mEq daily, Robaxin 750 mg QID, Valium 5 mg TID, Flexeril prn.      ID  #Anti-infectives.  -Continue PTA acyclovir.  -Will hold Fluconazole/Levaquin ppx as patient is not neutropenic.   - Resumed on discharge or when ANC <1000.      CV  #Lymphedema of lower extremities.  -Worsening BLE edema; ECHO done 12/7 with EF 55-60%. BLE doppler US negative for DVTs. Chronic issue  -Continue PTA HCTZ 12.5mg daily.  -continue PTA Lasix 20mg PO BID (hold if SBP<110 or DBP<60 given recent hypotension).      #Hypotension. RESOLVED.  Developed asymptomatic early morning of 12/8. S/p 1L NS overnight and held HCTZ/Lasix on 12/8 AM. Improved s/p fluid and PRBC support. Now normotensive.       PULM  #Asthma, allergies.   -Continue PTA singular, zyrtec.  -Patient stated she is not taking her inhalers, not entered.      FEN  -IVF per chemo regimen.  -PTA electrolyte replacements and lyte protocol.  -RDAT.      PPx  -VTE: resumed ppx Lovenox on 12/9 (after holding prior to 12/8 LP).  -PUD: PTA ranitidine.  -Bowels: PTA docusate and miralax.       Dispo: Pending completion of chemotherapy and resolution of any acute toxicities.  - pt has close f/u for labs, Neulasta, and SANJANA on 12/13  - labs arranged for 12/15, 12/19  - PET/CT on 12/22. Pt believes she is also due to have MRI; will send message to clinic team regarding this.  - f/u with Dr. Sauceda currently scheduled on 12/26. Pt also concerned that her schedule is due to start early in the week (which means she has to take more time off from work). However, it looks like the schedule in [facility] is not due until 12/28. Thus, 12/26 is likely just her appt with Dr. Sauceda. I will send message to the clinic team to confirm this as well.     Merlene Green  [facility]  [phone]  Hematology/Oncology  December 11, 2017    Code Status  Full   Primary Care Physician   Shahla Crawley    Physical Exam   Vital Signs with Ranges  Temp:  [96.6  F (35.9   C)-97.6  F (36.4  C)] 97.1  F (36.2  C)  Pulse:  [125] 125  Heart Rate:  [] 107  Resp:  [16-20] 20  BP: (106-135)/(55-69) 133/67  SpO2:  [94 %-98 %] 95 %  Constitutional: Awake, alert, cooperative, no apparent distress, and appears stated age.  Eyes: PERRL, EOMI, sclera clear, conjunctiva normal.  ENT: Normocephalic, without obvious abnormality, atraumatic, oral pharynx with moist mucus membranes, no ulcerations or thrush  Respiratory: Non-labored breathing on RA, CTAB, no crackles or wheezing.  Cardiovascular: RRR, no murmur noted.  GI: obese, + bowel sounds, soft, non-distended, non-tender, no masses palpated, no hepatosplenomegaly.  Skin: No rash or concerning lesions on exposed areas.   Musculoskeletal: There is no redness, warmth, or swelling of the joints. 1-2+ edema, no stockings in place, on PO lasix.  Neurologic: A&O x 3. Cranial nerves II-XII are grossly intact.    Neuropsychiatric: Calm, affect normal.  Vascular Access: port is clean and dry, without erythema, swelling, or tenderness.    Discharge Disposition  D/C to home in stable condition with close f/u scheduled including Neulasta/SANJANA visit.   Discharge Orders     CBC with platelets differential   Last Lab Result: Hemoglobin (g/dL)      Date                     Value                12/11/2017               9.6 (L)          ----------     Comprehensive metabolic panel     Reason for your hospital stay   Admitted for cycle 4 DA-R-EPOCH     Adult [facility] Follow-up and recommended labs and tests   - Please f/u as scheduled (12/13 labs+SANJANA+neulasta), 12/15 & 12/19 labs, 12/22 PET (r/t ongoing chest & back pain per Sohail last clinic note), 12/26 (labs & sohail), pending admit 12/28 per Sohail's last clinic note.     Appointments on [place] and/or [facility] (with [facility] or [facility] provider or service). Call [phone] if you haven't heard regarding these appointments within 7 days of discharge.     Follow Up and recommended labs and  tests   - CBC & CMP 2 x weekly per DA-R-EPOCH regimen  - PET scheduled 12/22/17     Activity   Your activity upon discharge: activity as tolerated     When to contact your care team   Please call the [facility] Triage RN Line [phone] ([facility] RN available M-F 8-5, after 5 pm the RN Advisor will page the On-Call Oncology Fellow who will return your call) for temperature greater than 100.4 F, uncontrolled nausea/vomiting/diarrhea or unrelieved constipation, pain not relieved by medications, bleeding not stopped by pressure, dizziness, chest pain, shortness of breath, changes in level of consciousness, or any other new concerning symptoms.     Full Code     Diet   Follow this diet upon discharge: regular diet as tolerated       Discharge Medications   Discharge Medication List as of 12/11/2017  1:27 PM      START taking these medications    Details   acetaminophen (TYLENOL) 325 MG tablet Take 2 tablets (650 mg) by mouth every 4 hours as needed for mild pain or fever, Disp-100 tablet, OTC      predniSONE (DELTASONE) 20 MG tablet Take 3 tablets (60 mg) by mouth 2 times daily for 2 doses, Disp-6 tablet, R-0, E-Prescribe      !! levothyroxine (SYNTHROID/LEVOTHROID) 112 MCG tablet Take 3 tablets (336 mcg) by mouth once a week, Disp-30 tablet, No Print Out      [employer] F[employer] GUMMY BEARS CHILDREN CHEW Take 5 g by mouth daily (2 gummy= 5 g =1 serving), OTC       !! - Potential duplicate medications found. Please discuss with provider.      CONTINUE these medications which have CHANGED    Details   ondansetron (ZOFRAN-ODT) 8 MG ODT tab Take 1 tablet (8 mg) by mouth every 8 hours as needed, Disp-30 tablet, R-0, E-Prescribe      fluconazole (DIFLUCAN) 200 MG tablet Take 1 tablet (200 mg) by mouth daily, Disp-30 tablet, R-0, E-Prescribe      cetirizine (ZYRTEC ALLERGY) 10 MG tablet Take 1 tablet (10 mg) by mouth 3 times daily On hold for lab test., Disp-30 tablet, R-0, E-Prescribe      acyclovir (ZOVIRAX) 400 MG tablet Take 1  tablet (400 mg) by mouth 2 times daily, Disp-60 tablet, R-0, E-Prescribe      dexamethasone (DECADRON) 4 MG tablet Take 2 tablets (8 mg) by mouth daily for 2 days, Disp-4 tablet, R-0, E-Prescribe      diclofenac (VOLTAREN) 1 % GEL topical gel Apply affected area two times daily PRN using enclosed dosing card.Disp-100 g, [id number]      levofloxacin (LEVAQUIN) 250 MG tablet Take 1 tablet (250 mg) by mouth daily, Disp-30 tablet, R-0, E-Prescribe      allopurinol (ZYLOPRIM) 300 MG tablet Take 1 tablet (300 mg) by mouth daily, Disp-30 tablet, R-0, E-Prescribe      ferrous sulfate (IRON) 325 (65 FE) MG tablet Take 1 tablet (325 mg) by mouth 3 times daily (with meals), Disp-90 tablet, R-0, OTC      cyclobenzaprine (FLEXERIL) 10 MG tablet Take 1 tablet (10 mg) by mouth 3 times daily as needed, Disp-30 tablet, R-0, E-Prescribe         CONTINUE these medications which have NOT CHANGED    Details   triamcinolone (KENALOG) 0.025 % ointment Apply topically as neededR-3Historical      !! morphine (MS CONTIN) 30 MG 12 hr tablet Take 1 tablet (30 mg) by mouth every 8 hours . Take an addition pill at night such that your evening dose is 60mg, Disp-120 tablet, R-0, Local Print      oxyCODONE IR (ROXICODONE) 30 MG tablet Take 1 tablet (30 mg) by mouth every 4 hours as needed for moderate to severe pain, Disp-180 tablet, R-0, Local Print      lidocaine (XYLOCAINE) 5 % ointment Apply quarter size amount to chest and back up to 3 times daily as needed for pain.Disp-350 g, R-1Local Print      montelukast (SINGULAIR) 10 MG tablet Take 1 tablet (10 mg) by mouth 2 times daily, Disp-60 tablet, R-0, E-Prescribe      furosemide (LASIX) 20 MG tablet Take 1 tablet (20 mg) by mouth 2 times daily, Disp-60 tablet, R-0, E-Prescribe      magic mouthwash suspension (diphenhydrAMINE, lidocaine, aluminum-magnesium & simethicone) Swish and spit 10 mLs in mouth every 6 hours as needed for mouth sores, Disp-360 mL, R-1, Local Print      bisacodyl  (DULCOLAX) 5 MG EC tablet Take 3 tablets (15 mg) by mouth daily as needed for constipation, Disp-30 tablet, R-0, E-Prescribe      polyethylene glycol (MIRALAX) powder Take 17 g (1 capful) by mouth daily, Disp-510 g, R-0, E-Prescribe      senna-docusate (SENOKOT-S;PERICOLACE) 8.6-50 MG per tablet Take 1-2 tablets by mouth 2 times daily as needed for constipation, Disp-60 tablet, R-0, E-Prescribe      !! UNABLE TO FIND Take 2 capsules by mouth 3 times daily Muscle Mag. 2 caps contain B1 20mg, B2 20mg, B6 10mg, magesium 20mg, manganese 2mg., Historical      !! Morphine Sulfate (MS CONTIN PO) Take 60 mg by mouth daily Takes at 8pm, Historical      lidocaine-prilocaine (EMLA) cream Apply topically as needed (port access pain.)Disp-30 g, [id number]      calcitRIOL (ROCALTROL) 0.25 MCG capsule TAKE 2 CAPSULES BY MOUTH EVERY MORNING AND TAKE 1 CAPSULE BY MOUTH EVERY NIGHT AT BEDTIME, Disp-270 capsule, R-3, E-Prescribe      celecoxib (CELEBREX) 200 MG capsule TAKE ONE CAPSULE BY MOUTH TWICE DAILY , Disp-56 capsule, R-0, E-Prescribe      !! order for DME Compression stockings, medium grade, please measure and fit. Please dispense a pair.Disp-1 Units, R-0, Local Print      methocarbamol (ROBAXIN) 750 MG tablet Take 1 tablet (750 mg) by mouth 4 times daily as needed . Ok to take a 5th Robaxin on very severe days., Disp-360 tablet, R-1, E-Prescribe      COMPOUND CONTAINING CONTROLLED SUBSTANCE (CMPD RX) - PHARMACY TO MIX COMPOUNDED MEDICATION Apply small amount to affected area two times daily., Disp-120 g, R-6, Local PrintKetamine 6% + lidocaine 10% in Lipo.      diazepam (VALIUM) 5 MG tablet Take 1 tablet (5 mg) by mouth 3 times daily as needed For muscle spasms, Disp-90 tablet, R-2, Local Print      !! levothyroxine (SYNTHROID/LEVOTHROID) 112 MCG tablet Mon-Sat: (2 tabs daily) Sunday: 3 tabs, Disp-189 tablet, R-3, E-Prescribe      hydrochlorothiazide (MICROZIDE) 12.5 MG capsule Take 1 capsule (12.5 mg) by mouth daily,  Disp-90 capsule, R-2, E-Prescribe      EPINEPHrine (EPIPEN 2-CARIDAD) 0.3 MG/0.3ML injection Inject 0.3 mLs (0.3 mg) into the muscle once as needed for anaphylaxis, Disp-0.6 mL, R-3, E-Prescribe      potassium chloride SA (POTASSIUM CHLORIDE) 20 MEQ CR tablet Take 1 tablet (20 mEq) by mouth daily, Disp-90 tablet, R-3, E-Prescribe      VENTOLIN  (90 BASE) MCG/ACT Inhaler INHALE 2 PUFFS INTO THE LUNGS EVERY 4 HOURS AS NEEDED FOR SHORTNESS OF BREATH OR DIFFICULT BREATHING OR WHEEZING, Disp-18 g, R-3, E-Prescribe      cromolyn (OPTICROM) 4 % ophthalmic solution Place 1 drop into both eyes 4 times daily, Disp-10 mL, R-5, E-Prescribe      NASALCROM 5.2 MG/ACT AERS Inhaler SPRAY ONE SPRAY( 1 ML) IN NOSTRIL DAILY, Disp-1 Bottle, R-6, E-Prescribe      Cholecalciferol (VITAMIN D3) 2000 UNITS CAPS Take 5,000 Units by mouth daily Takes 2 tabs daily, Disp-60 capsule, R-4, Historical      !! order for DME Equipment being ordered: compression stockings. 20-30 mm or 30 - 40 mm as patient can tolerate. Physical therapy to determine if they should be above or below the knee.Disp-2 Units, R-4, Local Print      !! order for DME Equipment being ordered: compression braDisp-2 Device, R-1, Local Print      ranitidine (ZANTAC) 75 MG tablet Take 1 tablet (75 mg) by mouth 3 times daily, Disp-270 tablet, R-3, E-Prescribe      aluminum chloride (DRYSOL) 20 % external solution Apply topically At BedtimeDisp-60 mL, [id number]      !! UNABLE TO FIND MEDICATION NAME: Tumeric 3 capsules daily (on hold during chemo), Historical      !! UNABLE TO FIND 2 tablets 3 times daily MEDICATION NAME: Natural D-Hist (on hold during chemo), Historical      calcium citrate-vitamin D (CALCIUM CITRATE +D) 315-250 MG-UNIT TABS Take 2 tablets by mouth twice a week , Disp-120 tablet, Historical      calcium carbonate (TUMS) 500 MG chewable tablet Take 2 tablets (1,000 mg) by mouth nightly as needed for other (cramps), Disp-180 chew tab, R-3, Historical      !!  UNABLE TO FIND 3 tablets 3 times daily MEDICATION NAME: calcium D-Glucarate   3 caps contain 180mg of elemental calcium., Historical      Triamcinolone Acetonide (AZMACORT IN) Inhale 2 puffs into the lungs as needed, Historical      !! UNABLE TO FIND 2 tablets 3 times daily MEDICATION NAME: Digestzymes , Historical      !! UNABLE TO FIND 1 tablet daily MEDICATION NAME: Pure Encapsulations, Historical      Probiotic Product (PROBIOTIC DAILY PO) Take 1 capsule by mouth daily Lacto acid bifidobacterium, Historical       !! - Potential duplicate medications found. Please discuss with provider.      STOP taking these medications       prochlorperazine (COMPAZINE) 10 MG tablet Comments:   Reason for Stopping:         docusate sodium (COLACE) 100 MG tablet Comments:   Reason for Stopping:         SUMAtriptan (IMITREX) 50 MG tablet Comments:   Reason for Stopping:         diphenhydrAMINE (BENADRYL) 50 MG capsule Comments:   Reason for Stopping:         mometasone (ASMANEX 30 METERED DOSES) 110 MCG/INH inhaler Comments:   Reason for Stopping:             Allergies   Allergies   Allergen Reactions     Baclofen Other (See Comments) and Unknown     Other reaction(s): Edema, chest pain, seizures.      No Clinical Screening - See Comments Shortness Of Breath, Palpitations, Anaphylaxis, Itching, Swelling, Difficulty breathing and Rash     Sukhjinder wipes- oral allergy -  July 2015: throat tightness from a Chinese herbal medicine Wilmer Tran     Serotonin Anxiety, Other (See Comments) and Swelling     Seizures      Amitriptyline Hcl Swelling     Birch Trees      Potatoes, carrots, cherries, celery, apple, pears, plums, peaches, parsnip, kiwi, hazelnuts, and apricots,      Blue Dyes Itching     Headaches       Cymbalta Other (See Comments)     Flushing, tremor/muscle twitching and edema     Gabapentin Other (See Comments)     edema  Systemic edema, weaned off from Feb to March per Dr. Dowd.    edema     Grass      Mugwort  [Artemisia Vulgaris]      Various spices     Ragweeds      Melons, bananas, cucumbers, zucchini.     Topamax      Nortriptyline Itching, Visual Disturbance, Swelling, GI Disturbance, Anxiety, Other (See Comments) and Nausea     Other reaction(s): Swelling     Data    Recent Labs   Lab Test  12/11/17   0612  12/10/17   0544  12/09/17   0538  12/08/17   0457  12/07/17   0908   11/16/17   0658   11/02/17   1648   10/30/17   0535   WBC  5.9  7.5  8.7  7.4  7.9   < >  6.1   < >  7.2   < >  8.5   HGB  9.6*  10.0*  9.7*  8.0*  9.7*   < >  9.9*   < >  10.1*   < >  8.1*   PLT  298  289  296  290  296   < >  345   < >  302   < >  205   NA  142  144  143  142  140   < >  141   < >  137   < >  144   POTASSIUM  3.3*  3.6  3.6  3.6  3.4   < >  3.8   < >  3.0*   < >  3.4   CHLORIDE  102  107  108  105  102   < >  104   < >  92*   < >  105   CO2  29  30  27  26  30   < >  28   < >  36*   < >  30   ANIONGAP  12  7  8  10  9   < >  8   < >  9   < >  8   BUN  17  18  14  16  14   < >  14   < >  23   < >  15   CR  0.64  0.68  0.77  0.84  0.90   < >  0.80   < >  0.88   < >  0.70   ELMO  7.6*  7.5*  7.7*  8.2*  8.4*   < >  8.4*   < >  8.8   < >  7.2*   MAG   --    --    --    --   2.0   --    --    --    --    --   2.2   PHOS   --    --    --    --   4.2   --    --    --   3.6   < >  3.4   LDH   --    --    --    --   382*   --   376*   --    --    < >  327*   URIC   --    --    --    --   6.3*   --   6.1*   --    --    < >  5.0   BILITOTAL  0.7  0.4  0.6  0.5  0.3   < >  0.2   < >  0.7   --    --    ALKPHOS  54  71  68  69  89   < >  85   < >  61   --    --    ALT  24  26  24  24  27   < >  26   < >  40   --    --    AST  15  18  18  20  26   < >  21   < >  18   --    --    ALBUMIN  2.6*  2.8*  2.6*  2.5*  3.0*   < >  2.7*   < >  3.6   --    --     < > = values in this interval not displayed.

## 2017-12-12 ENCOUNTER — CARE COORDINATION (OUTPATIENT)
Dept: ONCOLOGY | Facility: CLINIC | Age: 57
End: 2017-12-12

## 2017-12-12 ENCOUNTER — TELEPHONE (OUTPATIENT)
Dept: FAMILY MEDICINE | Facility: CLINIC | Age: 57
End: 2017-12-12

## 2017-12-12 ENCOUNTER — TELEPHONE (OUTPATIENT)
Dept: PHARMACY | Facility: OTHER | Age: 57
End: 2017-12-12

## 2017-12-12 NOTE — TELEPHONE ENCOUNTER
MTM referral from: Transitions of Care (recent hospital discharge or ED visit)    MTM referral outreach attempt #1 on December 12, 2017 at 12:06 PM      Outcome: Spoke with patient's daughter and patient will call us back to schedule an appointment.    Marychuy Song, Pharmacy Intern

## 2017-12-12 NOTE — PROGRESS NOTES
POST HOSPITAL DISCHARGE CALL  Date of Admission:  12/7/2017  Date of Discharge:  12/11/2017  No post hospital call needed as patient has an appointment within 72 hours.    Appointment info:  [facility] 12/13/17 7:30 AM [facility]  [facility]                 [facility] 12/13/17 8:00 AM [facility] [place]

## 2017-12-12 NOTE — PROGRESS NOTES
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 She is currently undergoing treatment with DA-R-EPOCH. Her past medical history is significant for breast cancer in 2011 s/p bilateral mastectomies and BENJIE/BSO up until recently on Tamoxifen, papillary thyroid cancer s/p total thyroidectomy, chronic chest wall pain, and asthma. Briefly, her oncologic history is as follows:    She presented in 8/2017 with dramatically worse chest and back pain. CT 9/1/17 showed lytic lesion right 10th rib extending to right T9-10 neural foramen with vertebral body invasion. There was associated conglomerate of lymphadenopathy around the mid T-spine. She had a CT guided biopsy showing B-cell high grade lymphoma. Pathology showed \"The morphology shows a population of large cells, diffuse lymphoid infiltrate. The cells are atypical with abundant mitotic and apoptotic activity and single cell necrosis. Tumor is CD45 and CD79a positive and focally weakly CD30 positive. The other stains revealed positivity for CD20, BCL6, BCL2 and MUM1, and CD10 and CD21 negative. The CD5 and CD3 highlighted background T-cells.  MYC expression was equivocal with approximately 40% nuclei staining.  Ki-67 proliferative index is greater than 90-95%. The immunohistochemistry is suggestive of non-GCB immunophenotype of diffuse large B-cell lymphoma. The cytogenetics did not show rearrangement of the BCL2 or c-MYC. There is the presence of BCL6 rearrangement in 78% of cells and gains of MYC and BCL2, but no amplifications.\"     Staging LP and BMBx were negative for lymphoma. She started DA-REPOCH 10/6/17. She did well with chemo other than rigors during CIVI. D/c 10/11/17. Given Neulasta 10/12/17. Post cycle 1 complicated by mucositis and worsening of chronic LE peripheral edema. She began cycle 2 on " "10/27/17. Due to a rise in her LD and uric acid levels on that admission day, she underwent a CT scan which showed response to therapy with improvement in her mediastinal lymphadenopathy and right chest wall mass.     Interval History:  Elvin is here for follow-up of cycle 4 DA-R-EPOCH.  Elvin is here for follow-up after cycle 4 of chemotherapy.  She reports this cycle actually went a lot better at the faster rate. She feels tired today. Attributes this to chemotherapy and working full time. She denies that this is the opiates.  She didn't have any nausea and remains without any, but is also scheduling the Zofran every 8 hours for now. Bowels moving better with Colace and fiber supplements, not using Miralax daily. She remains with chronic chest wall pain and using the oxycodone 30 mg in addition to the long acting meds. She states her port hurts still, it has hurt since it was placed. No redness or drainage. No change in the way it feels. The dryness on her ands and feet has returned, using creams again. No mouth sores right now but typically gets them later in the week, has MMW available. Leg swelling stable, using gloria hose. Intermittent neuropathy in her fingertips and toes, perhaps slightly worse. No fevers, chills, cough, SOB, new pains, bleeding.      She reports she feels safe at home. She reports that when she learned of her diagnosis, her  was very upset and verbally aggressive with her because he \"didn't believe it\". She reports that episode is now resolved and there have been no recurrent episodes. She denies any physical abuse. She does not feel she needs any assistance with that situation.     Current Outpatient Prescriptions   Medication Sig Dispense Refill     acetaminophen (TYLENOL) 325 MG tablet Take 2 tablets (650 mg) by mouth every 4 hours as needed for mild pain or fever 100 tablet      ondansetron (ZOFRAN-ODT) 8 MG ODT tab Take 1 tablet (8 mg) by mouth every 8 hours as needed 30 tablet 0 "     fluconazole (DIFLUCAN) 200 MG tablet Take 1 tablet (200 mg) by mouth daily 30 tablet 0     cetirizine (ZYRTEC ALLERGY) 10 MG tablet Take 1 tablet (10 mg) by mouth 3 times daily On hold for lab test. 30 tablet 0     acyclovir (ZOVIRAX) 400 MG tablet Take 1 tablet (400 mg) by mouth 2 times daily 60 tablet 0     [START ON 12/13/2017] dexamethasone (DECADRON) 4 MG tablet Take 2 tablets (8 mg) by mouth daily for 2 days 4 tablet 0     predniSONE (DELTASONE) 20 MG tablet Take 3 tablets (60 mg) by mouth 2 times daily for 2 doses 6 tablet 0     diclofenac (VOLTAREN) 1 % GEL topical gel Apply affected area two times daily PRN using enclosed dosing card. 100 g 0     levofloxacin (LEVAQUIN) 250 MG tablet Take 1 tablet (250 mg) by mouth daily 30 tablet 0     allopurinol (ZYLOPRIM) 300 MG tablet Take 1 tablet (300 mg) by mouth daily 30 tablet 0     ferrous sulfate (IRON) 325 (65 FE) MG tablet Take 1 tablet (325 mg) by mouth 3 times daily (with meals) 90 tablet 0     cyclobenzaprine (FLEXERIL) 10 MG tablet Take 1 tablet (10 mg) by mouth 3 times daily as needed 30 tablet 0     [START ON 12/17/2017] levothyroxine (SYNTHROID/LEVOTHROID) 112 MCG tablet Take 3 tablets (336 mcg) by mouth once a week 30 tablet      [employer] FIBER GUMMY BEARS CHILDREN CHEW Take 5 g by mouth daily (2 gummy= 5 g =1 serving)       triamcinolone (KENALOG) 0.025 % ointment Apply topically as needed  3     morphine (MS CONTIN) 30 MG 12 hr tablet Take 1 tablet (30 mg) by mouth every 8 hours . Take an addition pill at night such that your evening dose is 60mg 120 tablet 0     oxyCODONE IR (ROXICODONE) 30 MG tablet Take 1 tablet (30 mg) by mouth every 4 hours as needed for moderate to severe pain 180 tablet 0     lidocaine (XYLOCAINE) 5 % ointment Apply quarter size amount to chest and back up to 3 times daily as needed for pain. 350 g 1     montelukast (SINGULAIR) 10 MG tablet Take 1 tablet (10 mg) by mouth 2 times daily (Patient taking differently: Take 20 mg by  mouth 2 times daily ) 60 tablet 0     furosemide (LASIX) 20 MG tablet Take 1 tablet (20 mg) by mouth 2 times daily 60 tablet 0     magic mouthwash suspension (diphenhydrAMINE, lidocaine, aluminum-magnesium & simethicone) Swish and spit 10 mLs in mouth every 6 hours as needed for mouth sores 360 mL 1     bisacodyl (DULCOLAX) 5 MG EC tablet Take 3 tablets (15 mg) by mouth daily as needed for constipation 30 tablet 0     polyethylene glycol (MIRALAX) powder Take 17 g (1 capful) by mouth daily (Patient taking differently: Take 1 capful by mouth daily as needed ) 510 g 0     senna-docusate (SENOKOT-S;PERICOLACE) 8.6-50 MG per tablet Take 1-2 tablets by mouth 2 times daily as needed for constipation 60 tablet 0     UNABLE TO FIND Take 2 capsules by mouth 3 times daily Muscle Mag. 2 caps contain B1 20mg, B2 20mg, B6 10mg, magesium 20mg, manganese 2mg.       Morphine Sulfate (MS CONTIN PO) Take 60 mg by mouth daily Takes at 8pm       lidocaine-prilocaine (EMLA) cream Apply topically as needed (port access pain.) 30 g 1     calcitRIOL (ROCALTROL) 0.25 MCG capsule TAKE 2 CAPSULES BY MOUTH EVERY MORNING AND TAKE 1 CAPSULE BY MOUTH EVERY NIGHT AT BEDTIME (Patient taking differently: TAKE 2 CAPSULES BY MOUTH EVERY MORNING AND TAKE 1 CAPSULE BY MOUTH at noon) 270 capsule 3     celecoxib (CELEBREX) 200 MG capsule TAKE ONE CAPSULE BY MOUTH TWICE DAILY  56 capsule 0     order for DME Compression stockings, medium grade, please measure and fit. Please dispense a pair. 1 Units 0     methocarbamol (ROBAXIN) 750 MG tablet Take 1 tablet (750 mg) by mouth 4 times daily as needed . Ok to take a 5th Robaxin on very severe days. (Patient taking differently: Take 750 mg by mouth 4 times daily . Ok to take a 5th Robaxin on very severe days.) 360 tablet 1     COMPOUND CONTAINING CONTROLLED SUBSTANCE (CMPD RX) - PHARMACY TO MIX COMPOUNDED MEDICATION Apply small amount to affected area two times daily. (Patient not taking: Reported on 11/27/2017)  120 g 6     diazepam (VALIUM) 5 MG tablet Take 1 tablet (5 mg) by mouth 3 times daily as needed For muscle spasms (Patient taking differently: Take 5 mg by mouth 3 times daily For muscle spasms) 90 tablet 2     levothyroxine (SYNTHROID/LEVOTHROID) 112 MCG tablet Mon-Sat: (2 tabs daily) Sunday: 3 tabs (Patient taking differently: 112 mcg Mon-Sat: (2 tabs daily) Sunday: 3 tabs) 189 tablet 3     hydrochlorothiazide (MICROZIDE) 12.5 MG capsule Take 1 capsule (12.5 mg) by mouth daily 90 capsule 2     EPINEPHrine (EPIPEN 2-CARIDAD) 0.3 MG/0.3ML injection Inject 0.3 mLs (0.3 mg) into the muscle once as needed for anaphylaxis (Patient not taking: Reported on 12/7/2017) 0.6 mL 3     potassium chloride SA (POTASSIUM CHLORIDE) 20 MEQ CR tablet Take 1 tablet (20 mEq) by mouth daily 90 tablet 3     VENTOLIN  (90 BASE) MCG/ACT Inhaler INHALE 2 PUFFS INTO THE LUNGS EVERY 4 HOURS AS NEEDED FOR SHORTNESS OF BREATH OR DIFFICULT BREATHING OR WHEEZING (Patient not taking: Reported on 12/7/2017) 18 g 3     cromolyn (OPTICROM) 4 % ophthalmic solution Place 1 drop into both eyes 4 times daily 10 mL 5     NASALCROM 5.2 MG/ACT AERS Inhaler SPRAY ONE SPRAY( 1 ML) IN NOSTRIL DAILY (Patient not taking: Reported on 12/7/2017) 1 Bottle 6     Cholecalciferol (VITAMIN D3) 2000 UNITS CAPS Take 5,000 Units by mouth daily Takes 2 tabs daily 60 capsule 4     order for DME Equipment being ordered: compression stockings. 20-30 mm or 30 - 40 mm as patient can tolerate. Physical therapy to determine if they should be above or below the knee. 2 Units 4     order for DME Equipment being ordered: compression bra (Patient not taking: Reported on 12/7/2017) 2 Device 1     ranitidine (ZANTAC) 75 MG tablet Take 1 tablet (75 mg) by mouth 3 times daily 270 tablet 3     aluminum chloride (DRYSOL) 20 % external solution Apply topically At Bedtime (Patient not taking: Reported on 12/7/2017) 60 mL 3     UNABLE TO FIND MEDICATION NAME: Tumeric 3 capsules daily (on  hold during chemo)       UNABLE TO FIND 2 tablets 3 times daily MEDICATION NAME: Natural D-Hist (on hold during chemo)       calcium citrate-vitamin D (CALCIUM CITRATE +D) 315-250 MG-UNIT TABS Take 2 tablets by mouth twice a week  120 tablet      calcium carbonate (TUMS) 500 MG chewable tablet Take 2 tablets (1,000 mg) by mouth nightly as needed for other (cramps) 180 chew tab 3     UNABLE TO FIND 3 tablets 3 times daily MEDICATION NAME: calcium D-Glucarate   3 caps contain 180mg of elemental calcium.       Triamcinolone Acetonide (AZMACORT IN) Inhale 2 puffs into the lungs as needed       UNABLE TO FIND 2 tablets 3 times daily MEDICATION NAME: Digestzymes        UNABLE TO FIND 1 tablet daily MEDICATION NAME: Pure Encapsulations       Probiotic Product (PROBIOTIC DAILY PO) Take 1 capsule by mouth daily Lacto acid bifidobacterium       Physical Examination:  /71 (BP Location: Left arm, Patient Position: Sitting, Cuff Size: Adult Regular)  Pulse 120  Temp 98.5  F (36.9  C) (Oral)  Resp 16  Wt 88.2 kg (194 lb 6.4 oz)  SpO2 98%  BMI 33.9 kg/m2  Wt Readings from Last 10 Encounters:   12/13/17 88.2 kg (194 lb 6.4 oz)   12/11/17 88.4 kg (194 lb 14.4 oz)   12/07/17 86.3 kg (190 lb 4.8 oz)   11/28/17 84.3 kg (185 lb 12.8 oz)   11/28/17 84.3 kg (185 lb 12.8 oz)   11/27/17 83 kg (182 lb 14.4 oz)   11/22/17 83.8 kg (184 lb 11.2 oz)   11/20/17 85.2 kg (187 lb 12.8 oz)   11/16/17 85.1 kg (187 lb 11.2 oz)   11/13/17 85.3 kg (188 lb)   Constitutional: Well-appearing female in no acute distress. Appears tired.  Eyes: EOMI, PERRL. No scleral icterus.  ENT: Oral mucosa is moist without lesions or thrush.   Lymphatic: Neck is supple without cervical or supraclavicular lymphadenopathy.   Cardiovascular: Mild tachycardia with regular rhythm.  Respiratory: Clear to auscultation bilaterally. No wheezes or crackles.  Gastrointestinal: Bowel sounds present. Abdomen soft, nontender.  No masses palpated.   Neurologic: Cranial  "nerves II through XII are intact.   Skin: Skin on fingertips and the bottoms of her feet is dry and mildly erythematous.  Port site is unremarkable  Extremities: 1+ lower extremity edema bilaterally.    Laboratory Data:  Results for IMMANUEL LOVETT (MRN [id number]) as of 12/13/2017 08:13   Ref. Range 12/10/2017 05:44 12/11/2017 06:12 12/13/2017 08:01   WBC Latest Ref Range: 4.0 - 11.0 10e9/L 7.5 5.9 3.5 (L)   Hemoglobin Latest Ref Range: 11.7 - 15.7 g/dL 10.0 (L) 9.6 (L) 9.9 (L)   Hematocrit Latest Ref Range: 35.0 - 47.0 % 31.9 (L) 30.2 (L) 31.1 (L)   Platelet Count Latest Ref Range: 150 - 450 10e9/L 289 298 278     Assessment and Plan:    1. DLBCL, \"double-hit\"-like, currently on treatment with DA-R-EPOCH  Cycle 1 overall tolerated well other than mucositis and mild infusion reaction (rigors) to continuous infusion of chemotherapy. Interval CT CAP after 1 cycle due to elevated LD and uric acid showed response to treatment. She is here today for follow-up of cycle 4 (day 1 = 12/7), which was dose increased per protocol.  She is thus far tolerating treatment fairly well with some fatigue, nausea, and constipation. She does have intermittent neuropathy in her hands and feet, which we will need to continue monitoring. Plan to repeat PET/CT after this cycle. She will see Dr. Sauceda the following week.  - Neulasta today    2. Weight gain and leg swelling. BNP and Echo today prior to admission were okay with grade I or early diastolic dysfunction noted on the final Echo report. This is stable.    3. Heme: counts stable today.   No transfusion needs today. Continue twice weekly checks and transfuse if Hgb<8 and Plt<10k.     4. ID: without any localizing infectious s/s today.  Continue ppx ACV 400mg BID. Levaquin 250mg daily and fluconazole 200mg daily    5. History of stage 1, ER/WI+, HER2- breast cancer s/p bilateral mastectomies and BENJIE/BSO  Follows with Dr. Crawley. Oncotype recurrence score 11%. Was initially on " Arimidex ([id number]) but changed to Tamoxifen due to arthralgias. Tamoxifen held since 10/5 and continue to hold with chemotherapy. Last f/u Dr. Crawley was on 11/27/17.    6. History of papillary thyroid cancer, s/p total thyroidectomy  Follows with Dr. Castro. Has post surgical hypothyroidism. Continue levothyroxine and calcitriol as prescribed. Neck u/s on 11/2 shows no evidence of disease.    7. History of Rachael en Y gastric bypass  Continue supplements (calcium citrate-vitamin D, vitamin D3, magnesium citrate). Also has iron deficiency anemia likely from poor absorption and per Dr. Sauceda, she should continue 325mg PO ferrous sulfate TID.     8. Chronic chest wall pain s/p mastectomies  Follows with Dr. Benitez. Palliative care to continue to prescribe pain medications. Taking MS Contin 30/30/60 mg tid and oxycodone prn, currently 15 mg qid for breakthrough pain, and celebrex. Unclear to me what her baseline is but she did appear tired today, she did not feel this was from the opiates but I think we need to watch this closely.    10. Constipation.  Under control with Colace, fiber tabs, and MiraLax when needed, which she will continue.      11. Dry skin and peeling.  Possibly secondary to methotrexate with potential for exfoliative dermatitis. Recommend Eucerin cream at least bid.      12. Mast cell: refilled her nasal spray       Evelia Johns PA-C

## 2017-12-12 NOTE — TELEPHONE ENCOUNTER
This patient was discharged from [facility] on 12/11/2017.    Discharge Diagnosis: DLBCL (Diffuse Large B Cell Lymphoma) (H) Lymphoma    A follow-up visit has not been scheduled.      Number of ED/ER visits in the last 12 months:         Please follow-up with patient.    Dionne Rich,

## 2017-12-13 ENCOUNTER — APPOINTMENT (OUTPATIENT)
Dept: LAB | Facility: CLINIC | Age: 57
End: 2017-12-13
Payer: COMMERCIAL

## 2017-12-13 ENCOUNTER — ONCOLOGY VISIT (OUTPATIENT)
Dept: ONCOLOGY | Facility: CLINIC | Age: 57
End: 2017-12-13
Attending: PHYSICIAN ASSISTANT
Payer: COMMERCIAL

## 2017-12-13 ENCOUNTER — TELEPHONE (OUTPATIENT)
Dept: ONCOLOGY | Facility: CLINIC | Age: 57
End: 2017-12-13

## 2017-12-13 VITALS
DIASTOLIC BLOOD PRESSURE: 71 MMHG | HEIGHT: 64 IN | TEMPERATURE: 98.5 F | RESPIRATION RATE: 16 BRPM | HEART RATE: 120 BPM | WEIGHT: 194.4 LBS | OXYGEN SATURATION: 98 % | SYSTOLIC BLOOD PRESSURE: 131 MMHG | BODY MASS INDEX: 33.19 KG/M2

## 2017-12-13 DIAGNOSIS — C85.10 HIGH GRADE B-CELL LYMPHOMA (H): Primary | ICD-10-CM

## 2017-12-13 DIAGNOSIS — D47.02 MAST CELL DISEASE, SYSTEMIC: Chronic | ICD-10-CM

## 2017-12-13 LAB
ALBUMIN SERPL-MCNC: 2.8 G/DL (ref 3.4–5)
ALP SERPL-CCNC: 62 U/L (ref 40–150)
ALT SERPL W P-5'-P-CCNC: 19 U/L (ref 0–50)
ANION GAP SERPL CALCULATED.3IONS-SCNC: 7 MMOL/L (ref 3–14)
AST SERPL W P-5'-P-CCNC: 14 U/L (ref 0–45)
BACTERIA SPEC CULT: NO GROWTH
BASOPHILS # BLD AUTO: 0 10E9/L (ref 0–0.2)
BASOPHILS NFR BLD AUTO: 0.6 %
BILIRUB SERPL-MCNC: 0.5 MG/DL (ref 0.2–1.3)
BUN SERPL-MCNC: 16 MG/DL (ref 7–30)
CALCIUM SERPL-MCNC: 8 MG/DL (ref 8.5–10.1)
CHLORIDE SERPL-SCNC: 101 MMOL/L (ref 94–109)
CO2 SERPL-SCNC: 32 MMOL/L (ref 20–32)
CREAT SERPL-MCNC: 0.67 MG/DL (ref 0.52–1.04)
DIFFERENTIAL METHOD BLD: ABNORMAL
EOSINOPHIL # BLD AUTO: 0.1 10E9/L (ref 0–0.7)
EOSINOPHIL NFR BLD AUTO: 2.6 %
ERYTHROCYTE [DISTWIDTH] IN BLOOD BY AUTOMATED COUNT: 21.2 % (ref 10–15)
GFR SERPL CREATININE-BSD FRML MDRD: >90 ML/MIN/1.7M2
GLUCOSE SERPL-MCNC: 131 MG/DL (ref 70–99)
HCT VFR BLD AUTO: 31.1 % (ref 35–47)
HGB BLD-MCNC: 9.9 G/DL (ref 11.7–15.7)
IMM GRANULOCYTES # BLD: 0 10E9/L (ref 0–0.4)
IMM GRANULOCYTES NFR BLD: 0.3 %
LYMPHOCYTES # BLD AUTO: 0.3 10E9/L (ref 0.8–5.3)
LYMPHOCYTES NFR BLD AUTO: 7.5 %
MCH RBC QN AUTO: 29.9 PG (ref 26.5–33)
MCHC RBC AUTO-ENTMCNC: 31.8 G/DL (ref 31.5–36.5)
MCV RBC AUTO: 94 FL (ref 78–100)
MONOCYTES # BLD AUTO: 0 10E9/L (ref 0–1.3)
MONOCYTES NFR BLD AUTO: 0.9 %
NEUTROPHILS # BLD AUTO: 3 10E9/L (ref 1.6–8.3)
NEUTROPHILS NFR BLD AUTO: 88.1 %
NRBC # BLD AUTO: 0 10*3/UL
NRBC BLD AUTO-RTO: 0 /100
PLATELET # BLD AUTO: 278 10E9/L (ref 150–450)
POTASSIUM SERPL-SCNC: 3.5 MMOL/L (ref 3.4–5.3)
PROT SERPL-MCNC: 5.6 G/DL (ref 6.8–8.8)
RBC # BLD AUTO: 3.31 10E12/L (ref 3.8–5.2)
SODIUM SERPL-SCNC: 140 MMOL/L (ref 133–144)
SPECIMEN SOURCE: NORMAL
WBC # BLD AUTO: 3.5 10E9/L (ref 4–11)

## 2017-12-13 PROCEDURE — 99214 OFFICE O/P EST MOD 30 MIN: CPT | Mod: ZP | Performed by: PHYSICIAN ASSISTANT

## 2017-12-13 PROCEDURE — 99213 OFFICE O/P EST LOW 20 MIN: CPT | Mod: ZF

## 2017-12-13 PROCEDURE — 85025 COMPLETE CBC W/AUTO DIFF WBC: CPT | Performed by: NURSE PRACTITIONER

## 2017-12-13 PROCEDURE — 80053 COMPREHEN METABOLIC PANEL: CPT | Performed by: NURSE PRACTITIONER

## 2017-12-13 PROCEDURE — 96372 THER/PROPH/DIAG INJ SC/IM: CPT | Mod: ZF

## 2017-12-13 PROCEDURE — 25000128 H RX IP 250 OP 636: Mod: ZF

## 2017-12-13 PROCEDURE — 25000128 H RX IP 250 OP 636: Mod: ZF | Performed by: PHYSICIAN ASSISTANT

## 2017-12-13 RX ORDER — PROCHLORPERAZINE MALEATE 10 MG
10 TABLET ORAL PRN
Refills: 3 | COMMUNITY
Start: 2017-11-03 | End: 2021-08-11

## 2017-12-13 RX ORDER — CROMOLYN SODIUM 5.2 MG
AEROSOL, SPRAY WITH PUMP (ML) NASAL
Qty: 1 BOTTLE | Refills: 6 | Status: ON HOLD | OUTPATIENT
Start: 2017-12-13 | End: 2018-01-22

## 2017-12-13 RX ORDER — HEPARIN SODIUM (PORCINE) LOCK FLUSH IV SOLN 100 UNIT/ML 100 UNIT/ML
5 SOLUTION INTRAVENOUS
Status: COMPLETED | OUTPATIENT
Start: 2017-12-13 | End: 2017-12-13

## 2017-12-13 RX ORDER — SUMATRIPTAN 50 MG/1
50 TABLET, FILM COATED ORAL PRN
Refills: 3 | COMMUNITY
Start: 2017-11-07 | End: 2018-08-29

## 2017-12-13 RX ADMIN — SODIUM CHLORIDE, PRESERVATIVE FREE 5 ML: 5 INJECTION INTRAVENOUS at 07:54

## 2017-12-13 RX ADMIN — PEGFILGRASTIM 6 MG: 6 INJECTION SUBCUTANEOUS at 09:00

## 2017-12-13 ASSESSMENT — PAIN SCALES - GENERAL: PAINLEVEL: SEVERE PAIN (7)

## 2017-12-13 NOTE — MR AVS SNAPSHOT
After Visit Summary   12/13/2017    Tisha Arias    MRN: [id number]           Patient Information     Date Of Birth          1960        Visit Information        Provider Department      12/13/2017 8:00 AM Evelia Johns PA-C [facility]        Today's Diagnoses     High grade B-cell lymphoma (H)    -  1    Mast cell disease, systemic           Follow-ups after your visit        Your next 10 appointments already scheduled     Dec 15, 2017  9:00 AM CST   Masonic Lab Draw with  MASONIC LAB DRAW   [facility] Masonic Lab Draw ([facility])    [address] [place] [address]   [phone]            Dec 19, 2017  7:30 AM CST   Masonic Lab Draw with  MASONIC LAB DRAW   [facility] Masonic Lab Draw ([facility])    [address] [place] [address]   [phone]            Dec 22, 2017  9:00 AM CST   (Arrive by 8:45 AM)   PET ONCOLOGY WHOLE BODY with UUPET1   [facility] PET CT ([facility])    [address] [facility] [address]   [phone]           Tell your doctor:   If there is any chance you may be pregnant or if you are breastfeeding.   If you have problems lying in small spaces (claustrophobia). If you do, your doctor may give you medicine to help you relax. If you have diabetes:   Have your exam early in the morning. Your blood glucose will go up as the day goes by.   Your glucose level must be 180 or less at the start of the exam. Please take any medicines you need to ensure this blood glucose level. 24 hours before your scan: Don t do any heavy exercise. (No jogging, aerobics or other workouts.) Exercise will make your pictures less accurate. 6 hours before your scan:   Stop all food and liquids (except water).   Do not chew gum or suck on mints.   If you need to take medicine with food, you may  take it with a few crackers.  Please call your Imaging Department at your exam site with any questions.            Dec 26, 2017  1:30 PM CST   Masonic Lab Draw with  MASONIC LAB DRAW   [facility] Lab Draw ([facility])    [address] [place] [address]  [address]nd [facility] [address]   [phone]            Dec 26, 2017  2:00 PM CST   RETURN ONC with Garima Sauceda MD   [facility] Blood and Marrow Transplant ([facility])    [address] [place] [id number] [phone]            May 21, 2018  4:20 PM CDT   (Arrive by 4:05 PM)   RETURN ENDOCRINE with Avelina Castro MD   [facility] Endocrinology ([facility])    [address] [place] [address] [phone]            Jun 11, 2018  4:00 PM CDT   (Arrive by 3:45 PM)   Return Visit with Shahla Crawley MD   [facility] ([facility])    [address] [place] [id number] [phone]              Future tests that were ordered for you today     Open Future Orders        Priority Expected Expires Ordered    CBC with platelets differential Routine  12/13/2018 12/13/2017    CBC with platelets differential Routine 12/26/2017 1/13/2018 12/13/2017    Comprehensive metabolic panel Routine 12/26/2017 1/13/2018 12/13/2017            Who to contact     If you have questions or need follow up information about today's clinic visit or your schedule please contact [facility] directly at [phone].  Normal or non-critical lab and imaging results will be communicated to you by MyChart, letter or phone within 4 business days after the clinic has received the results. If you do not hear from us within 7 days, please contact the clinic through MyChart or phone. If you have a critical or abnormal lab result, we will notify you by phone as soon as  "possible.  Submit refill requests through [employer] or call your pharmacy and they will forward the refill request to us. Please allow 3 business days for your refill to be completed.          Additional Information About Your Visit        [employer] Information     [employer] gives you secure access to your electronic health record. If you see a primary care provider, you can also send messages to your care team and make appointments. If you have questions, please call your primary care clinic.  If you do not have a primary care provider, please call [phone] and they will assist you.        Care EveryWhere ID     This is your Care EveryWhere ID. This could be used by other organizations to access your [place] medical records  [id number]        Your Vitals Were     Pulse Temperature Respirations Height Pulse Oximetry BMI (Body Mass Index)    120 98.5  F (36.9  C) (Oral) 16 1.613 m (5' 3.5\") 98% 33.89 kg/m2       Blood Pressure from Last 3 Encounters:   12/13/17 131/71   12/11/17 133/67   12/07/17 118/75    Weight from Last 3 Encounters:   12/13/17 88.2 kg (194 lb 6.4 oz)   12/11/17 88.4 kg (194 lb 14.4 oz)   12/07/17 86.3 kg (190 lb 4.8 oz)              We Performed the Following     CBC with platelets differential     Comprehensive metabolic panel          Today's Medication Changes          These changes are accurate as of: 12/13/17 10:04 AM.  If you have any questions, ask your nurse or doctor.               These medicines have changed or have updated prescriptions.        Dose/Directions    calcitRIOL 0.25 MCG capsule   Commonly known as:  ROCALTROL   This may have changed:  additional instructions   Used for:  Postsurgical hypothyroidism, Papillary carcinoma, follicular variant (H), Metastasis to cervical lymph node (H)        TAKE 2 CAPSULES BY MOUTH EVERY MORNING AND TAKE 1 CAPSULE BY MOUTH EVERY NIGHT AT BEDTIME   Quantity:  270 capsule   Refills:  3       cromolyn sodium 5.2 MG/ACT Aers Inhaler   Commonly " known as:  NASALCROM   This may have changed:  See the new instructions.   Used for:  Mast cell disease, systemic   Changed by:  Evelia Johns PA-C        SPRAY ONE SPRAY( 1 ML) IN NOSTRIL DAILY   Quantity:  1 Bottle   Refills:  6       diazepam 5 MG tablet   Commonly known as:  VALIUM   This may have changed:    - when to take this  - additional instructions   Used for:  Chest wall pain        Dose:  5 mg   Take 1 tablet (5 mg) by mouth 3 times daily as needed For muscle spasms   Quantity:  90 tablet   Refills:  2       * levothyroxine 112 MCG tablet   Commonly known as:  SYNTHROID/LEVOTHROID   This may have changed:    - how much to take  - additional instructions   Used for:  Postsurgical hypothyroidism, Papillary carcinoma, follicular variant (H), Postsurgical hypoparathyroidism (H), Thyroid cancer (H)   Changed by:  Avelina Castro MD        Mon-Sat: (2 tabs daily) Sunday: 3 tabs   Quantity:  189 tablet   Refills:  3       * levothyroxine 112 MCG tablet   Commonly known as:  SYNTHROID/LEVOTHROID   This may have changed:  Another medication with the same name was changed. Make sure you understand how and when to take each.   Used for:  High grade B-cell lymphoma (H)        Dose:  336 mcg   Start taking on:  12/17/2017   Take 3 tablets (336 mcg) by mouth once a week   Quantity:  30 tablet   Refills:  0       methocarbamol 750 MG tablet   Commonly known as:  ROBAXIN   This may have changed:    - when to take this  - additional instructions   Used for:  Myofascial pain        Dose:  750 mg   Take 1 tablet (750 mg) by mouth 4 times daily as needed . Ok to take a 5th Robaxin on very severe days.   Quantity:  360 tablet   Refills:  1       montelukast 10 MG tablet   Commonly known as:  SINGULAIR   This may have changed:  how much to take   Used for:  Idiopathic mast cell activation syndrome (H)        Dose:  10 mg   Take 1 tablet (10 mg) by mouth 2 times daily   Quantity:  60 tablet   Refills:  0        * Notice:  This list has 2 medication(s) that are the same as other medications prescribed for you. Read the directions carefully, and ask your doctor or other care provider to review them with you.         Where to get your medicines      These medications were sent to [id number] - [place], [place] - [address] [place]  [address] [address] [place] [address]     Phone:  [phone]     cromolyn sodium 5.2 MG/ACT Aers Inhaler                Primary Care Provider Office Phone # Fax #    Shahla Crawley [phone]       [address] [facility] [address]  [facility] [address]        Equal Access to Services     [facility] : Hadii sacha Venegas, waaxda joo, qaybta kaalmada daina, ranjan yoder . So [facility] [phone].    ATENCIÓN: Si habla español, tiene a stiles disposición servicios gratuitos de asistencia lingüística. Llame al [phone].    We comply with applicable federal civil rights laws and [place] laws. We do not discriminate on the basis of race, color, national origin, age, disability, sex, sexual orientation, or gender identity.            Thank you!     Thank you for choosing [facility]  for your care. Our goal is always to provide you with excellent care. Hearing back from our patients is one way we can continue to improve our services. Please take a few minutes to complete the written survey that you may receive in the mail after your visit with us. Thank you!             Your Updated Medication List - Protect others around you: Learn how to safely use, store and throw away your medicines at www.disposemymeds.org.          This list is accurate as of: 12/13/17 10:04 AM.  Always use your most recent med list.                   Brand Name Dispense Instructions for use Diagnosis    acetaminophen 325 MG tablet    TYLENOL    100 tablet    Take 2 tablets (650 mg) by mouth every 4 hours as needed for mild pain or fever     High grade B-cell lymphoma (H)       acyclovir 400 MG tablet    ZOVIRAX    60 tablet    Take 1 tablet (400 mg) by mouth 2 times daily    High grade B-cell lymphoma (H)       allopurinol 300 MG tablet    ZYLOPRIM    30 tablet    Take 1 tablet (300 mg) by mouth daily    High grade B-cell lymphoma (H)       aluminum chloride 20 % external solution    DRYSOL    60 mL    Apply topically At Bedtime    Rash, Intertrigo       AZMACORT IN      Inhale 2 puffs into the lungs as needed        bisacodyl 5 MG EC tablet     30 tablet    Take 3 tablets (15 mg) by mouth daily as needed for constipation    Constipation, unspecified constipation type       calcitRIOL 0.25 MCG capsule    ROCALTROL    270 capsule    TAKE 2 CAPSULES BY MOUTH EVERY MORNING AND TAKE 1 CAPSULE BY MOUTH EVERY NIGHT AT BEDTIME    Postsurgical hypothyroidism, Papillary carcinoma, follicular variant (H), Metastasis to cervical lymph node (H)       CALCIUM CITRATE +D 315-250 MG-UNIT Tabs per tablet   Generic drug:  calcium citrate-vitamin D     120 tablet    Take 2 tablets by mouth twice a week        celecoxib 200 MG capsule    celeBREX    56 capsule    TAKE ONE CAPSULE BY MOUTH TWICE DAILY    Chest wall pain       cetirizine 10 MG tablet    ZYRTEC ALLERGY    30 tablet    Take 1 tablet (10 mg) by mouth 3 times daily On hold for lab test.    High grade B-cell lymphoma (H)       COMPOUND CONTAINING CONTROLLED SUBSTANCE - PHARMACY TO MIX COMPOUNDED MEDICATION    CMPD RX    120 g    Apply small amount to affected area two times daily.    Neoplasm related pain       cromolyn 4 % ophthalmic solution    OPTICROM    10 mL    Place 1 drop into both eyes 4 times daily    Idiopathic mast cell activation syndrome (H)       cromolyn sodium 5.2 MG/ACT Aers Inhaler    NASALCROM    1 Bottle    SPRAY ONE SPRAY( 1 ML) IN NOSTRIL DAILY    Mast cell disease, systemic       [employer] GUM[employer] CHILDREN Chew      Take 5 g by mouth daily (2 gummy= 5 g =1 serving)    High grade  B-cell lymphoma (H)       cyclobenzaprine 10 MG tablet    FLEXERIL    30 tablet    Take 1 tablet (10 mg) by mouth 3 times daily as needed    High grade B-cell lymphoma (H)       dexamethasone 4 MG tablet    DECADRON    4 tablet    Take 2 tablets (8 mg) by mouth daily for 2 days    High grade B-cell lymphoma (H)       diazepam 5 MG tablet    VALIUM    90 tablet    Take 1 tablet (5 mg) by mouth 3 times daily as needed For muscle spasms    Chest wall pain       diclofenac 1 % Gel topical gel    VOLTAREN    100 g    Apply affected area two times daily PRN using enclosed dosing card.    Myofascial pain       EPINEPHrine 0.3 MG/0.3ML injection 2-pack    EPIPEN 2-CARIDAD    0.6 mL    Inject 0.3 mLs (0.3 mg) into the muscle once as needed for anaphylaxis        ferrous sulfate 325 (65 FE) MG tablet    IRON    90 tablet    Take 1 tablet (325 mg) by mouth 3 times daily (with meals)    Status post bariatric surgery       fluconazole 200 MG tablet    DIFLUCAN    30 tablet    Take 1 tablet (200 mg) by mouth daily    High grade B-cell lymphoma (H)       furosemide 20 MG tablet    LASIX    60 tablet    Take 1 tablet (20 mg) by mouth 2 times daily    Localized edema       hydrochlorothiazide 12.5 MG capsule    MICROZIDE    90 capsule    Take 1 capsule (12.5 mg) by mouth daily    Hypocalcemia       levofloxacin 250 MG tablet    LEVAQUIN    30 tablet    Take 1 tablet (250 mg) by mouth daily    High grade B-cell lymphoma (H)       * levothyroxine 112 MCG tablet    SYNTHROID/LEVOTHROID    189 tablet    Mon-Sat: (2 tabs daily) Sunday: 3 tabs    Postsurgical hypothyroidism, Papillary carcinoma, follicular variant (H), Postsurgical hypoparathyroidism (H), Thyroid cancer (H)       * levothyroxine 112 MCG tablet   Start taking on:  12/17/2017    SYNTHROID/LEVOTHROID    30 tablet    Take 3 tablets (336 mcg) by mouth once a week    High grade B-cell lymphoma (H)       lidocaine 5 % ointment    XYLOCAINE    350 g    Apply quarter size amount to  chest and back up to 3 times daily as needed for pain.    Chest wall pain       lidocaine visc 2% 2.5mL/5mL & maalox/mylanta w/ simeth 2.5mL/5mL & diphenhydrAMINE 5mg/5mL Susp suspension    [facility]    360 mL    Swish and spit 10 mLs in mouth every 6 hours as needed for mouth sores    Stomatitis and mucositis, High grade B-cell lymphoma (H)       lidocaine-prilocaine cream    EMLA    30 g    Apply topically as needed (port access pain.)    Neoplasm related pain, Chest wall pain       methocarbamol 750 MG tablet    ROBAXIN    360 tablet    Take 1 tablet (750 mg) by mouth 4 times daily as needed . Ok to take a 5th Robaxin on very severe days.    Myofascial pain       montelukast 10 MG tablet    SINGULAIR    60 tablet    Take 1 tablet (10 mg) by mouth 2 times daily    Idiopathic mast cell activation syndrome (H)       * MS CONTIN PO      Take 60 mg by mouth daily Takes at 8pm        * morphine 30 MG 12 hr tablet    MS CONTIN    120 tablet    Take 1 tablet (30 mg) by mouth every 8 hours . Take an addition pill at night such that your evening dose is 60mg    Neoplasm related pain       ondansetron 8 MG ODT tab    ZOFRAN-ODT    30 tablet    Take 1 tablet (8 mg) by mouth every 8 hours as needed    High grade B-cell lymphoma (H), Nausea and vomiting, intractability of vomiting not specified, unspecified vomiting type       * order for DME     2 Units    Equipment being ordered: compression stockings. 20-30 mm or 30 - 40 mm as patient can tolerate. Physical therapy to determine if they should be above or below the knee.    Venous stasis       * order for DME     2 Device    Equipment being ordered: compression bra    Malignant neoplasm of right female breast, unspecified site of breast       * order for DME     1 Units    Compression stockings, medium grade, please measure and fit. Please dispense a pair.    Peripheral edema       oxyCODONE IR 30 MG tablet    ROXICODONE    180 tablet    Take 1 tablet (30 mg)  by mouth every 4 hours as needed for moderate to severe pain    High grade B-cell lymphoma (H)       polyethylene glycol powder    MIRALAX    510 g    Take 17 g (1 capful) by mouth daily    Acute constipation       potassium chloride SA 20 MEQ CR tablet    KLOR-CON    90 tablet    Take 1 tablet (20 mEq) by mouth daily    Hypokalemia       PROBIOTIC DAILY PO      Take 1 capsule by mouth daily Lacto acid bifidobacterium    Breast cancer, unspecified laterality, Thyroid cancer (H), Chronic arthralgias of knees and hips, unspecified laterality       prochlorperazine 10 MG tablet    COMPAZINE     Take 10 mg by mouth as needed        ranitidine 75 MG tablet    ZANTAC    270 tablet    Take 1 tablet (75 mg) by mouth 3 times daily    Mast cell disease, systemic       senna-docusate 8.6-50 MG per tablet    SENOKOT-S;PERICOLACE    60 tablet    Take 1-2 tablets by mouth 2 times daily as needed for constipation    Acute constipation       SUMAtriptan 50 MG tablet    IMITREX     Take 50 mg by mouth as needed        triamcinolone 0.025 % ointment    KENALOG     Apply topically as needed        TUMS 500 MG chewable tablet   Generic drug:  calcium carbonate     180 chew tab    Take 2 tablets (1,000 mg) by mouth nightly as needed for other (cramps)        * UNABLE TO FIND      3 tablets 3 times daily MEDICATION NAME: calcium D-Glucarate  3 caps contain 180mg of elemental calcium.        * UNABLE TO FIND      2 tablets 3 times daily MEDICATION NAME: Natural D-Hist (on hold during chemo)    Breast cancer, unspecified laterality, Papillary carcinoma, follicular variant (H), Chronic musculoskeletal pain       * UNABLE TO FIND      MEDICATION NAME: Tumeric 3 capsules daily (on hold during chemo)        * UNABLE TO FIND      Take 2 capsules by mouth 3 times daily Muscle Mag. 2 caps contain B1 20mg, B2 20mg, B6 10mg, magesium 20mg, manganese 2mg.        * UNABLE TO FIND      2 tablets 3 times daily MEDICATION NAME: Digestzymes    Thyroid  cancer (H), Postsurgical hypothyroidism, Postsurgical hypoparathyroidism (H)       * UNABLE TO FIND      1 tablet daily MEDICATION NAME: Pure Encapsulations    Thyroid cancer (H), Postsurgical hypothyroidism, Postsurgical hypoparathyroidism (H)       VENTOLIN  (90 BASE) MCG/ACT Inhaler   Generic drug:  albuterol     18 g    INHALE 2 PUFFS INTO THE LUNGS EVERY 4 HOURS AS NEEDED FOR SHORTNESS OF BREATH OR DIFFICULT BREATHING OR WHEEZING    Mild intermittent asthma without complication       vitamin D3 2000 UNITS Caps     60 capsule    Take 5,000 Units by mouth daily Takes 2 tabs daily    Thyroid cancer (H), Postsurgical hypothyroidism, Papillary carcinoma, follicular variant (H), Postsurgical hypoparathyroidism (H)       * Notice:  This list has 13 medication(s) that are the same as other medications prescribed for you. Read the directions carefully, and ask your doctor or other care provider to review them with you.

## 2017-12-13 NOTE — NURSING NOTE
Port de accessed per provider, site within normal limits, bandage placed over site, Pt tolerated well. See flowsheet  Raegan Gonsalves MA

## 2017-12-13 NOTE — NURSING NOTE
"Oncology Rooming Note    December 13, 2017 8:18 AM   Tisha Arias is a 57 year old female who presents for:    Chief Complaint   Patient presents with     Port Draw     port accessed and labs drawn by rn.  vs taken.     Oncology Clinic Visit     Return visit related to Lymphoma     Initial Vitals: /71 (BP Location: Left arm, Patient Position: Sitting, Cuff Size: Adult Regular)  Pulse 120  Temp 98.5  F (36.9  C) (Oral)  Resp 16  Ht 1.613 m (5' 3.5\")  Wt 88.2 kg (194 lb 6.4 oz)  SpO2 98%  BMI 33.89 kg/m2 Estimated body mass index is 33.89 kg/(m^2) as calculated from the following:    Height as of this encounter: 1.613 m (5' 3.5\").    Weight as of this encounter: 88.2 kg (194 lb 6.4 oz). Body surface area is 1.99 meters squared.  Severe Pain (7) Comment: back   No LMP recorded. Patient has had a hysterectomy.  Allergies reviewed: Yes  Medications reviewed: Yes    Medications: MEDICATION REFILLS NEEDED TODAY. Provider was notified.  Pharmacy name entered into [employer]: [id number] - [place], [place] - [address] [address]    Clinical concerns: Patient would like to discuss refills. Provider was notified.    10 minutes for nursing intake (face to face time)     Shahla Allison LPN            "

## 2017-12-13 NOTE — TELEPHONE ENCOUNTER
Oncology Distress Screening Follow-up  Clinical Social Work  [facility]    Identified Concern and Score From Distress Screening: How concerned are you about work and home life issues that may be affected by your cancer? 10; How concerned are you about knowing what resources are available to help you? 8; If you want to be contacted by one of our professionals, I can send a message to them right now. Clinical     Date of Distress Screenin17    Data: Per chart review Elvin is a 57 year old female who was seen in consultation for her diagnosis of Diffuse Large B Cell Lymphoma.     Intervention: Attempted to reach out to patient and was unable to leave a message due to voice mail box being full. EPIC noted that patient gave permission for messages to be left on her work phone. Message left on work phone.     Education Provided: Reason for call and how to reach Oncology Social Worker.     Follow-up Required: Will await return call from patient.     Puja Cope [facility] LICSW  2017  For follow-up: [facility]  - Mari Ashraf [facility] LICSW [phone]

## 2017-12-13 NOTE — NURSING NOTE
"Chief Complaint   Patient presents with     Port Draw     port accessed and labs drawn by rn.  vs taken.     Port accessed with 20g 3/4\" gripper needle, labs drawn, port flushed with saline and heparin, vitals checked.  Ioana Bragg RN    "

## 2017-12-13 NOTE — LETTER
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 She is currently undergoing treatment with DA-R-EPOCH. Her past medical history is significant for breast cancer in 2011 s/p bilateral mastectomies and BENJIE/BSO up until recently on Tamoxifen, papillary thyroid cancer s/p total thyroidectomy, chronic chest wall pain, and asthma. Briefly, her oncologic history is as follows:    She presented in 8/2017 with dramatically worse chest and back pain. CT 9/1/17 showed lytic lesion right 10th rib extending to right T9-10 neural foramen with vertebral body invasion. There was associated conglomerate of lymphadenopathy around the mid T-spine. She had a CT guided biopsy showing B-cell high grade lymphoma. Pathology showed \"The morphology shows a population of large cells, diffuse lymphoid infiltrate. The cells are atypical with abundant mitotic and apoptotic activity and single cell necrosis. Tumor is CD45 and CD79a positive and focally weakly CD30 positive. The other stains revealed positivity for CD20, BCL6, BCL2 and MUM1, and CD10 and CD21 negative. The CD5 and CD3 highlighted background T-cells.  MYC expression was equivocal with approximately 40% nuclei staining.  Ki-67 proliferative index is greater than 90-95%. The immunohistochemistry is suggestive of non-GCB immunophenotype of diffuse large B-cell lymphoma. The cytogenetics did not show rearrangement of the BCL2 or c-MYC. There is the presence of BCL6 rearrangement in 78% of cells and gains of MYC and BCL2, but no amplifications.\"     Staging LP and BMBx were negative for lymphoma. She started DA-REPOCH 10/6/17. She did well with chemo other than rigors during CIVI. D/c 10/11/17. Given Neulasta 10/12/17. Post cycle 1 " "complicated by mucositis and worsening of chronic LE peripheral edema. She began cycle 2 on 10/27/17. Due to a rise in her LD and uric acid levels on that admission day, she underwent a CT scan which showed response to therapy with improvement in her mediastinal lymphadenopathy and right chest wall mass.     Interval History:  Elvin is here for follow-up of cycle 4 DA-R-EPOCH.  Elvin is here for follow-up after cycle 4 of chemotherapy.  She reports this cycle actually went a lot better at the faster rate. She feels tired today. Attributes this to chemotherapy and working full time. She denies that this is the opiates.  She didn't have any nausea and remains without any, but is also scheduling the Zofran every 8 hours for now. Bowels moving better with Colace and fiber supplements, not using Miralax daily. She remains with chronic chest wall pain and using the oxycodone 30 mg in addition to the long acting meds. She states her port hurts still, it has hurt since it was placed. No redness or drainage. No change in the way it feels. The dryness on her ands and feet has returned, using creams again. No mouth sores right now but typically gets them later in the week, has MMW available. Leg swelling stable, using gloria hose. Intermittent neuropathy in her fingertips and toes, perhaps slightly worse. No fevers, chills, cough, SOB, new pains, bleeding.      She reports she feels safe at home. She reports that when she learned of her diagnosis, her  was very upset and verbally aggressive with her because he \"didn't believe it\". She reports that episode is now resolved and there have been no recurrent episodes. She denies any physical abuse. She does not feel she needs any assistance with that situation.     Current Outpatient Prescriptions   Medication Sig Dispense Refill     acetaminophen (TYLENOL) 325 MG tablet Take 2 tablets (650 mg) by mouth every 4 hours as needed for mild pain or fever 100 tablet      ondansetron " (ZOFRAN-ODT) 8 MG ODT tab Take 1 tablet (8 mg) by mouth every 8 hours as needed 30 tablet 0     fluconazole (DIFLUCAN) 200 MG tablet Take 1 tablet (200 mg) by mouth daily 30 tablet 0     cetirizine (ZYRTEC ALLERGY) 10 MG tablet Take 1 tablet (10 mg) by mouth 3 times daily On hold for lab test. 30 tablet 0     acyclovir (ZOVIRAX) 400 MG tablet Take 1 tablet (400 mg) by mouth 2 times daily 60 tablet 0     [START ON 12/13/2017] dexamethasone (DECADRON) 4 MG tablet Take 2 tablets (8 mg) by mouth daily for 2 days 4 tablet 0     predniSONE (DELTASONE) 20 MG tablet Take 3 tablets (60 mg) by mouth 2 times daily for 2 doses 6 tablet 0     diclofenac (VOLTAREN) 1 % GEL topical gel Apply affected area two times daily PRN using enclosed dosing card. 100 g 0     levofloxacin (LEVAQUIN) 250 MG tablet Take 1 tablet (250 mg) by mouth daily 30 tablet 0     allopurinol (ZYLOPRIM) 300 MG tablet Take 1 tablet (300 mg) by mouth daily 30 tablet 0     ferrous sulfate (IRON) 325 (65 FE) MG tablet Take 1 tablet (325 mg) by mouth 3 times daily (with meals) 90 tablet 0     cyclobenzaprine (FLEXERIL) 10 MG tablet Take 1 tablet (10 mg) by mouth 3 times daily as needed 30 tablet 0     [START ON 12/17/2017] levothyroxine (SYNTHROID/LEVOTHROID) 112 MCG tablet Take 3 tablets (336 mcg) by mouth once a week 30 tablet      [employer] FIBER GUMMY BEARS CHILDREN CHEW Take 5 g by mouth daily (2 gummy= 5 g =1 serving)       triamcinolone (KENALOG) 0.025 % ointment Apply topically as needed  3     morphine (MS CONTIN) 30 MG 12 hr tablet Take 1 tablet (30 mg) by mouth every 8 hours . Take an addition pill at night such that your evening dose is 60mg 120 tablet 0     oxyCODONE IR (ROXICODONE) 30 MG tablet Take 1 tablet (30 mg) by mouth every 4 hours as needed for moderate to severe pain 180 tablet 0     lidocaine (XYLOCAINE) 5 % ointment Apply quarter size amount to chest and back up to 3 times daily as needed for pain. 350 g 1     montelukast (SINGULAIR) 10 MG  tablet Take 1 tablet (10 mg) by mouth 2 times daily (Patient taking differently: Take 20 mg by mouth 2 times daily ) 60 tablet 0     furosemide (LASIX) 20 MG tablet Take 1 tablet (20 mg) by mouth 2 times daily 60 tablet 0     magic mouthwash suspension (diphenhydrAMINE, lidocaine, aluminum-magnesium & simethicone) Swish and spit 10 mLs in mouth every 6 hours as needed for mouth sores 360 mL 1     bisacodyl (DULCOLAX) 5 MG EC tablet Take 3 tablets (15 mg) by mouth daily as needed for constipation 30 tablet 0     polyethylene glycol (MIRALAX) powder Take 17 g (1 capful) by mouth daily (Patient taking differently: Take 1 capful by mouth daily as needed ) 510 g 0     senna-docusate (SENOKOT-S;PERICOLACE) 8.6-50 MG per tablet Take 1-2 tablets by mouth 2 times daily as needed for constipation 60 tablet 0     UNABLE TO FIND Take 2 capsules by mouth 3 times daily Muscle Mag. 2 caps contain B1 20mg, B2 20mg, B6 10mg, magesium 20mg, manganese 2mg.       Morphine Sulfate (MS CONTIN PO) Take 60 mg by mouth daily Takes at 8pm       lidocaine-prilocaine (EMLA) cream Apply topically as needed (port access pain.) 30 g 1     calcitRIOL (ROCALTROL) 0.25 MCG capsule TAKE 2 CAPSULES BY MOUTH EVERY MORNING AND TAKE 1 CAPSULE BY MOUTH EVERY NIGHT AT BEDTIME (Patient taking differently: TAKE 2 CAPSULES BY MOUTH EVERY MORNING AND TAKE 1 CAPSULE BY MOUTH at noon) 270 capsule 3     celecoxib (CELEBREX) 200 MG capsule TAKE ONE CAPSULE BY MOUTH TWICE DAILY  56 capsule 0     order for DME Compression stockings, medium grade, please measure and fit. Please dispense a pair. 1 Units 0     methocarbamol (ROBAXIN) 750 MG tablet Take 1 tablet (750 mg) by mouth 4 times daily as needed . Ok to take a 5th Robaxin on very severe days. (Patient taking differently: Take 750 mg by mouth 4 times daily . Ok to take a 5th Robaxin on very severe days.) 360 tablet 1     COMPOUND CONTAINING CONTROLLED SUBSTANCE (CMPD RX) - PHARMACY TO MIX COMPOUNDED MEDICATION  Apply small amount to affected area two times daily. (Patient not taking: Reported on 11/27/2017) 120 g 6     diazepam (VALIUM) 5 MG tablet Take 1 tablet (5 mg) by mouth 3 times daily as needed For muscle spasms (Patient taking differently: Take 5 mg by mouth 3 times daily For muscle spasms) 90 tablet 2     levothyroxine (SYNTHROID/LEVOTHROID) 112 MCG tablet Mon-Sat: (2 tabs daily) Sunday: 3 tabs (Patient taking differently: 112 mcg Mon-Sat: (2 tabs daily) Sunday: 3 tabs) 189 tablet 3     hydrochlorothiazide (MICROZIDE) 12.5 MG capsule Take 1 capsule (12.5 mg) by mouth daily 90 capsule 2     EPINEPHrine (EPIPEN 2-CARIDAD) 0.3 MG/0.3ML injection Inject 0.3 mLs (0.3 mg) into the muscle once as needed for anaphylaxis (Patient not taking: Reported on 12/7/2017) 0.6 mL 3     potassium chloride SA (POTASSIUM CHLORIDE) 20 MEQ CR tablet Take 1 tablet (20 mEq) by mouth daily 90 tablet 3     VENTOLIN  (90 BASE) MCG/ACT Inhaler INHALE 2 PUFFS INTO THE LUNGS EVERY 4 HOURS AS NEEDED FOR SHORTNESS OF BREATH OR DIFFICULT BREATHING OR WHEEZING (Patient not taking: Reported on 12/7/2017) 18 g 3     cromolyn (OPTICROM) 4 % ophthalmic solution Place 1 drop into both eyes 4 times daily 10 mL 5     NASALCROM 5.2 MG/ACT AERS Inhaler SPRAY ONE SPRAY( 1 ML) IN NOSTRIL DAILY (Patient not taking: Reported on 12/7/2017) 1 Bottle 6     Cholecalciferol (VITAMIN D3) 2000 UNITS CAPS Take 5,000 Units by mouth daily Takes 2 tabs daily 60 capsule 4     order for DME Equipment being ordered: compression stockings. 20-30 mm or 30 - 40 mm as patient can tolerate. Physical therapy to determine if they should be above or below the knee. 2 Units 4     order for DME Equipment being ordered: compression bra (Patient not taking: Reported on 12/7/2017) 2 Device 1     ranitidine (ZANTAC) 75 MG tablet Take 1 tablet (75 mg) by mouth 3 times daily 270 tablet 3     aluminum chloride (DRYSOL) 20 % external solution Apply topically At Bedtime (Patient not taking:  Reported on 12/7/2017) 60 mL 3     UNABLE TO FIND MEDICATION NAME: Tumeric 3 capsules daily (on hold during chemo)       UNABLE TO FIND 2 tablets 3 times daily MEDICATION NAME: Natural D-Hist (on hold during chemo)       calcium citrate-vitamin D (CALCIUM CITRATE +D) 315-250 MG-UNIT TABS Take 2 tablets by mouth twice a week  120 tablet      calcium carbonate (TUMS) 500 MG chewable tablet Take 2 tablets (1,000 mg) by mouth nightly as needed for other (cramps) 180 chew tab 3     UNABLE TO FIND 3 tablets 3 times daily MEDICATION NAME: calcium D-Glucarate   3 caps contain 180mg of elemental calcium.       Triamcinolone Acetonide (AZMACORT IN) Inhale 2 puffs into the lungs as needed       UNABLE TO FIND 2 tablets 3 times daily MEDICATION NAME: Digestzymes        UNABLE TO FIND 1 tablet daily MEDICATION NAME: Pure Encapsulations       Probiotic Product (PROBIOTIC DAILY PO) Take 1 capsule by mouth daily Lacto acid bifidobacterium       Physical Examination:  /71 (BP Location: Left arm, Patient Position: Sitting, Cuff Size: Adult Regular)  Pulse 120  Temp 98.5  F (36.9  C) (Oral)  Resp 16  Wt 88.2 kg (194 lb 6.4 oz)  SpO2 98%  BMI 33.9 kg/m2  Wt Readings from Last 10 Encounters:   12/13/17 88.2 kg (194 lb 6.4 oz)   12/11/17 88.4 kg (194 lb 14.4 oz)   12/07/17 86.3 kg (190 lb 4.8 oz)   11/28/17 84.3 kg (185 lb 12.8 oz)   11/28/17 84.3 kg (185 lb 12.8 oz)   11/27/17 83 kg (182 lb 14.4 oz)   11/22/17 83.8 kg (184 lb 11.2 oz)   11/20/17 85.2 kg (187 lb 12.8 oz)   11/16/17 85.1 kg (187 lb 11.2 oz)   11/13/17 85.3 kg (188 lb)   Constitutional: Well-appearing female in no acute distress. Appears tired.  Eyes: EOMI, PERRL. No scleral icterus.  ENT: Oral mucosa is moist without lesions or thrush.   Lymphatic: Neck is supple without cervical or supraclavicular lymphadenopathy.   Cardiovascular: Mild tachycardia with regular rhythm.  Respiratory: Clear to auscultation bilaterally. No wheezes or crackles.  Gastrointestinal:  "Bowel sounds present. Abdomen soft, nontender.  No masses palpated.   Neurologic: Cranial nerves II through XII are intact.   Skin: Skin on fingertips and the bottoms of her feet is dry and mildly erythematous.  Port site is unremarkable  Extremities: 1+ lower extremity edema bilaterally.    Laboratory Data:  Results for IMMANUEL LOVETT (MRN [id number]) as of 12/13/2017 08:13   Ref. Range 12/10/2017 05:44 12/11/2017 06:12 12/13/2017 08:01   WBC Latest Ref Range: 4.0 - 11.0 10e9/L 7.5 5.9 3.5 (L)   Hemoglobin Latest Ref Range: 11.7 - 15.7 g/dL 10.0 (L) 9.6 (L) 9.9 (L)   Hematocrit Latest Ref Range: 35.0 - 47.0 % 31.9 (L) 30.2 (L) 31.1 (L)   Platelet Count Latest Ref Range: 150 - 450 10e9/L 289 298 278     Assessment and Plan:    1. DLBCL, \"double-hit\"-like, currently on treatment with DA-R-EPOCH  Cycle 1 overall tolerated well other than mucositis and mild infusion reaction (rigors) to continuous infusion of chemotherapy. Interval CT CAP after 1 cycle due to elevated LD and uric acid showed response to treatment. She is here today for follow-up of cycle 4 (day 1 = 12/7), which was dose increased per protocol.  She is thus far tolerating treatment fairly well with some fatigue, nausea, and constipation. She does have intermittent neuropathy in her hands and feet, which we will need to continue monitoring. Plan to repeat PET/CT after this cycle. She will see Dr. Sauceda the following week.  - Neulasta today    2. Weight gain and leg swelling. BNP and Echo today prior to admission were okay with grade I or early diastolic dysfunction noted on the final Echo report. This is stable.    3. Heme: counts stable today.   No transfusion needs today. Continue twice weekly checks and transfuse if Hgb<8 and Plt<10k.     4. ID: without any localizing infectious s/s today.  Continue ppx ACV 400mg BID. Levaquin 250mg daily and fluconazole 200mg daily    5. History of stage 1, ER/AL+, HER2- breast cancer s/p bilateral " mastectomies and BENJIE/BSO  Follows with Dr. Crawley. Oncotype recurrence score 11%. Was initially on Arimidex ([id number]) but changed to Tamoxifen due to arthralgias. Tamoxifen held since 10/5 and continue to hold with chemotherapy. Last f/u Dr. Crawley was on 11/27/17.    6. History of papillary thyroid cancer, s/p total thyroidectomy  Follows with Dr. Castro. Has post surgical hypothyroidism. Continue levothyroxine and calcitriol as prescribed. Neck u/s on 11/2 shows no evidence of disease.    7. History of Rachael en Y gastric bypass  Continue supplements (calcium citrate-vitamin D, vitamin D3, magnesium citrate). Also has iron deficiency anemia likely from poor absorption and per Dr. Sauceda, she should continue 325mg PO ferrous sulfate TID.     8. Chronic chest wall pain s/p mastectomies  Follows with Dr. Benitez. Palliative care to continue to prescribe pain medications. Taking MS Contin 30/30/60 mg tid and oxycodone prn, currently 15 mg qid for breakthrough pain, and celebrex. Unclear to me what her baseline is but she did appear tired today, she did not feel this was from the opiates but I think we need to watch this closely.    10. Constipation.  Under control with Colace, fiber tabs, and MiraLax when needed, which she will continue.      11. Dry skin and peeling.  Possibly secondary to methotrexate with potential for exfoliative dermatitis. Recommend Eucerin cream at least bid.      12. Mast cell: refilled her nasal spray       Evelia Johns PA-C

## 2017-12-14 NOTE — TELEPHONE ENCOUNTER
MTM referral from: Transitions of Care (recent hospital discharge or ED visit)    MTM referral outreach attempt #2 on December 14, 2017 at 12:22 PM      Outcome: Patient not reachable after several attempts, will route to MTM Pharmacist/Provider as an FYI. Thank you for the referral.    Carey Lawrence, MTM Coordinator Intern

## 2017-12-15 VITALS
BODY MASS INDEX: 32.64 KG/M2 | WEIGHT: 187.2 LBS | OXYGEN SATURATION: 95 % | TEMPERATURE: 97.9 F | HEART RATE: 114 BPM | DIASTOLIC BLOOD PRESSURE: 86 MMHG | SYSTOLIC BLOOD PRESSURE: 138 MMHG | RESPIRATION RATE: 16 BRPM

## 2017-12-15 DIAGNOSIS — C85.10 HIGH GRADE B-CELL LYMPHOMA (H): ICD-10-CM

## 2017-12-15 LAB
ALBUMIN SERPL-MCNC: 3 G/DL (ref 3.4–5)
ALP SERPL-CCNC: 94 U/L (ref 40–150)
ALT SERPL W P-5'-P-CCNC: 37 U/L (ref 0–50)
ANION GAP SERPL CALCULATED.3IONS-SCNC: 9 MMOL/L (ref 3–14)
ANISOCYTOSIS BLD QL SMEAR: ABNORMAL
AST SERPL W P-5'-P-CCNC: 30 U/L (ref 0–45)
BASOPHILS # BLD AUTO: 0 10E9/L (ref 0–0.2)
BASOPHILS NFR BLD AUTO: 0 %
BILIRUB SERPL-MCNC: 0.6 MG/DL (ref 0.2–1.3)
BUN SERPL-MCNC: 18 MG/DL (ref 7–30)
CALCIUM SERPL-MCNC: 8 MG/DL (ref 8.5–10.1)
CHLORIDE SERPL-SCNC: 102 MMOL/L (ref 94–109)
CO2 SERPL-SCNC: 26 MMOL/L (ref 20–32)
CREAT SERPL-MCNC: 0.58 MG/DL (ref 0.52–1.04)
DACRYOCYTES BLD QL SMEAR: ABNORMAL
DIFFERENTIAL METHOD BLD: ABNORMAL
EOSINOPHIL # BLD AUTO: 0 10E9/L (ref 0–0.7)
EOSINOPHIL NFR BLD AUTO: 0 %
ERYTHROCYTE [DISTWIDTH] IN BLOOD BY AUTOMATED COUNT: 20.8 % (ref 10–15)
GFR SERPL CREATININE-BSD FRML MDRD: >90 ML/MIN/1.7M2
GLUCOSE SERPL-MCNC: 149 MG/DL (ref 70–99)
HCT VFR BLD AUTO: 30.6 % (ref 35–47)
HGB BLD-MCNC: 9.9 G/DL (ref 11.7–15.7)
LYMPHOCYTES # BLD AUTO: 0 10E9/L (ref 0.8–5.3)
LYMPHOCYTES NFR BLD AUTO: 0 %
MCH RBC QN AUTO: 31.1 PG (ref 26.5–33)
MCHC RBC AUTO-ENTMCNC: 32.4 G/DL (ref 31.5–36.5)
MCV RBC AUTO: 96 FL (ref 78–100)
MICROCYTES BLD QL SMEAR: PRESENT
MONOCYTES # BLD AUTO: 0 10E9/L (ref 0–1.3)
MONOCYTES NFR BLD AUTO: 0 %
NEUTROPHILS # BLD AUTO: 17.9 10E9/L (ref 1.6–8.3)
NEUTROPHILS NFR BLD AUTO: 100 %
OVALOCYTES BLD QL SMEAR: SLIGHT
PLATELET # BLD AUTO: 207 10E9/L (ref 150–450)
PLATELET # BLD EST: ABNORMAL 10*3/UL
POIKILOCYTOSIS BLD QL SMEAR: SLIGHT
POTASSIUM SERPL-SCNC: 4 MMOL/L (ref 3.4–5.3)
PROT SERPL-MCNC: 6.4 G/DL (ref 6.8–8.8)
RBC # BLD AUTO: 3.18 10E12/L (ref 3.8–5.2)
SODIUM SERPL-SCNC: 137 MMOL/L (ref 133–144)
WBC # BLD AUTO: 17.9 10E9/L (ref 4–11)

## 2017-12-15 PROCEDURE — 85025 COMPLETE CBC W/AUTO DIFF WBC: CPT | Performed by: PHYSICIAN ASSISTANT

## 2017-12-15 PROCEDURE — 25000128 H RX IP 250 OP 636

## 2017-12-15 PROCEDURE — 80053 COMPREHEN METABOLIC PANEL: CPT | Performed by: PHYSICIAN ASSISTANT

## 2017-12-15 RX ORDER — HEPARIN SODIUM (PORCINE) LOCK FLUSH IV SOLN 100 UNIT/ML 100 UNIT/ML
5 SOLUTION INTRAVENOUS ONCE
Status: COMPLETED | OUTPATIENT
Start: 2017-12-15 | End: 2017-12-15

## 2017-12-15 RX ADMIN — SODIUM CHLORIDE, PRESERVATIVE FREE 5 ML: 5 INJECTION INTRAVENOUS at 09:49

## 2017-12-15 ASSESSMENT — PAIN SCALES - GENERAL: PAINLEVEL: EXTREME PAIN (8)

## 2017-12-15 NOTE — NURSING NOTE
Chief Complaint   Patient presents with     Port Draw     Labs drawn from port by RN. Line flushed with saline and heparin. Vs taken     Port accessed by RN, labs collected, line flushed with saline and heparin.  Vitals taken.    Daly Hooper, RN

## 2017-12-19 ENCOUNTER — TELEPHONE (OUTPATIENT)
Dept: ONCOLOGY | Facility: CLINIC | Age: 57
End: 2017-12-19

## 2017-12-19 VITALS
HEART RATE: 111 BPM | WEIGHT: 184.7 LBS | SYSTOLIC BLOOD PRESSURE: 130 MMHG | TEMPERATURE: 97.8 F | BODY MASS INDEX: 31.53 KG/M2 | RESPIRATION RATE: 18 BRPM | HEIGHT: 64 IN | OXYGEN SATURATION: 94 % | DIASTOLIC BLOOD PRESSURE: 83 MMHG

## 2017-12-19 DIAGNOSIS — C85.10 HIGH GRADE B-CELL LYMPHOMA (H): ICD-10-CM

## 2017-12-19 DIAGNOSIS — C83.30 DLBCL (DIFFUSE LARGE B CELL LYMPHOMA) (H): Primary | ICD-10-CM

## 2017-12-19 LAB
ALBUMIN SERPL-MCNC: 3.1 G/DL (ref 3.4–5)
ALP SERPL-CCNC: 73 U/L (ref 40–150)
ALT SERPL W P-5'-P-CCNC: 29 U/L (ref 0–50)
ANION GAP SERPL CALCULATED.3IONS-SCNC: 9 MMOL/L (ref 3–14)
ANISOCYTOSIS BLD QL SMEAR: ABNORMAL
AST SERPL W P-5'-P-CCNC: 14 U/L (ref 0–45)
BASOPHILS # BLD AUTO: 0.1 10E9/L (ref 0–0.2)
BASOPHILS NFR BLD AUTO: 8.8 %
BILIRUB SERPL-MCNC: 0.3 MG/DL (ref 0.2–1.3)
BUN SERPL-MCNC: 17 MG/DL (ref 7–30)
CALCIUM SERPL-MCNC: 8.3 MG/DL (ref 8.5–10.1)
CHLORIDE SERPL-SCNC: 102 MMOL/L (ref 94–109)
CO2 SERPL-SCNC: 29 MMOL/L (ref 20–32)
CREAT SERPL-MCNC: 1.05 MG/DL (ref 0.52–1.04)
DIFFERENTIAL METHOD BLD: ABNORMAL
EOSINOPHIL # BLD AUTO: 0.1 10E9/L (ref 0–0.7)
EOSINOPHIL NFR BLD AUTO: 6.9 %
ERYTHROCYTE [DISTWIDTH] IN BLOOD BY AUTOMATED COUNT: 19.8 % (ref 10–15)
GFR SERPL CREATININE-BSD FRML MDRD: 54 ML/MIN/1.7M2
GLUCOSE SERPL-MCNC: 93 MG/DL (ref 70–99)
HCT VFR BLD AUTO: 30.8 % (ref 35–47)
HGB BLD-MCNC: 10 G/DL (ref 11.7–15.7)
LYMPHOCYTES # BLD AUTO: 0.4 10E9/L (ref 0.8–5.3)
LYMPHOCYTES NFR BLD AUTO: 35.3 %
MCH RBC QN AUTO: 30.8 PG (ref 26.5–33)
MCHC RBC AUTO-ENTMCNC: 32.5 G/DL (ref 31.5–36.5)
MCV RBC AUTO: 95 FL (ref 78–100)
METAMYELOCYTES # BLD: 0 10E9/L
METAMYELOCYTES NFR BLD MANUAL: 2 %
MONOCYTES # BLD AUTO: 0.2 10E9/L (ref 0–1.3)
MONOCYTES NFR BLD AUTO: 17.6 %
MYELOCYTES # BLD: 0 10E9/L
MYELOCYTES NFR BLD MANUAL: 1 %
NEUTROPHILS # BLD AUTO: 0.3 10E9/L (ref 1.6–8.3)
NEUTROPHILS NFR BLD AUTO: 28.4 %
NRBC # BLD AUTO: 0.1 10*3/UL
NRBC BLD AUTO-RTO: 5 /100
PLATELET # BLD AUTO: 111 10E9/L (ref 150–450)
PLATELET # BLD EST: ABNORMAL 10*3/UL
POIKILOCYTOSIS BLD QL SMEAR: SLIGHT
POLYCHROMASIA BLD QL SMEAR: SLIGHT
POTASSIUM SERPL-SCNC: 3.5 MMOL/L (ref 3.4–5.3)
PROT SERPL-MCNC: 6.2 G/DL (ref 6.8–8.8)
RBC # BLD AUTO: 3.25 10E12/L (ref 3.8–5.2)
SODIUM SERPL-SCNC: 140 MMOL/L (ref 133–144)
WBC # BLD AUTO: 1.1 10E9/L (ref 4–11)

## 2017-12-19 PROCEDURE — 80053 COMPREHEN METABOLIC PANEL: CPT | Performed by: PHYSICIAN ASSISTANT

## 2017-12-19 PROCEDURE — 25000128 H RX IP 250 OP 636

## 2017-12-19 PROCEDURE — 85025 COMPLETE CBC W/AUTO DIFF WBC: CPT | Performed by: PHYSICIAN ASSISTANT

## 2017-12-19 RX ORDER — HEPARIN SODIUM (PORCINE) LOCK FLUSH IV SOLN 100 UNIT/ML 100 UNIT/ML
5 SOLUTION INTRAVENOUS EVERY 8 HOURS PRN
Status: DISCONTINUED | OUTPATIENT
Start: 2017-12-19 | End: 2017-12-27 | Stop reason: HOSPADM

## 2017-12-19 RX ADMIN — SODIUM CHLORIDE, PRESERVATIVE FREE 5 ML: 5 INJECTION INTRAVENOUS at 07:58

## 2017-12-19 ASSESSMENT — PAIN SCALES - GENERAL: PAINLEVEL: EXTREME PAIN (9)

## 2017-12-19 NOTE — TELEPHONE ENCOUNTER
RN Care Coordination Note  Reason for Outgoing Call:   Jessica Cox, Laila Carerra, CASSIDY                   Please call patient. Advise neutropenic precautions and to take all of her antimicrobials. Please ask about her fluid intake. Creatinine has risen. Goal of at least 64 oz/day. If at goal, then should check a UA. Thanks.   Jessica     DUNIA:  COTY Jasso / Laila Barrett, RN:  Ok to recheck CBC, BMP on Friday after PET, ok to move admission to Thursday 12/28  Nursing Action/Patient Instruction:  fluconazole (DIFLUCAN) 200 MG tablet 30 tablet 0   Start: 12/11/2017   VERNA: No   Sig: Take 1 tablet (200 mg) by mouth daily        Disp Refills   levofloxacin (LEVAQUIN) 250 MG tablet 30 tablet 0   Start: 12/11/2017   VERNA: No   Sig: Take 1 tablet (250 mg) by mouth daily     acyclovir (ZOVIRAX) 400 MG tablet 60 tablet 0   Start: 12/11/2017   VERNA: No   Sig: Take 1 tablet (400 mg) by mouth 2 times daily     Left detailed message for pt on work # (no answer on cell #, voicemail box full) re: Jessica's recommendations above, that we need to add a lab appt on Friday am after PET,  and asked that she call me back to today to confirm receipt of information and instructions.  Roseonly message sent to pt re: all above as well.  Message to [employer] scheduling to add lab appt on Friday and orders placed.  Patient Response/Evaluation:   1530 Pt called me back, confirmed receipt of all above information and the Roseonly message, denies s/sx of infection and is taking prophy antimicrobials as above. She states she has not gotten 64 oz of fluid in in the past day, but will push to do this again. Understands we will call her on Friday with lab results. She is at work, but working in a private office, wearing mask in public, has reviewed neutropenic precautions and wants us to know that she is compliant with them.   Elvin also wants to do lab draw day prior to her admission for next chemo cycle, as she says inpt team advised  "this to help with delays in chemo start time after arrival to floor. Elvin also asks if her counts this cycle will result in her getting \"the lower dose of chemotherapy next cycle\" and understands that I will ask provider this question on her behalf and let her know.  Pt voiced understanding of above instructions and information and denied further questions today.  Laila Barrett, RN, BSN, OCN  Care Coordinator  [facility]        "

## 2017-12-19 NOTE — NURSING NOTE
Chief Complaint   Patient presents with     Port Draw     Labs drawn via port by RN. Line flushed, hep locked and de-accessed.      Ila Navarro RN

## 2017-12-20 DIAGNOSIS — C85.10 HIGH GRADE B-CELL LYMPHOMA (H): ICD-10-CM

## 2017-12-20 DIAGNOSIS — G89.3 NEOPLASM RELATED PAIN: ICD-10-CM

## 2017-12-20 NOTE — TELEPHONE ENCOUNTER
----- Message from Ab Kirby RN sent at 12/18/2017  9:16 AM CST -----      ----- Message -----     From: Elvira Davis RN     Sent: 12/15/2017   2:44 PM       To: Radha Gonzalez RN    She is calling for long acting Morphine 30 mg every 8 hours (60 at bedtime) and oxycodone 30 mg every 4 hours as needed. She said they're not due till 1/3/18. Thanks!

## 2017-12-20 NOTE — TELEPHONE ENCOUNTER
Received message from patient is requesting refills of morphine and oxycodone.     Last refill morphine: 11/30/2017  Last refill oxycodone: 11/24/2017  Last office visit: 9/27/2017  No follow up scheduled - patient has difficulty coming to apts due to her job, plan to see her when she is admitted next week on 12/27.     Will route request to MD for review.     Reviewed [place]  Report.

## 2017-12-21 ENCOUNTER — NURSE TRIAGE (OUTPATIENT)
Dept: NURSING | Facility: CLINIC | Age: 57
End: 2017-12-21

## 2017-12-21 DIAGNOSIS — Z00.6 EXAMINATION OF PARTICIPANT IN CLINICAL TRIAL: Primary | ICD-10-CM

## 2017-12-21 NOTE — TELEPHONE ENCOUNTER
Additional Information    Negative: [1] Caller is not with the adult (patient) AND [2] reporting urgent symptoms    Negative: Lab result questions    Negative: Medication questions    Negative: Caller cannot be reached by phone    Negative: Caller has already spoken to PCP or another triager    Negative: RN needs further essential information from caller in order to complete triage    Negative: Requesting regular office appointment    Negative: [1] Caller requesting NON-URGENT health information AND [2] PCP's office is the best resource    Health Information question, no triage required and triager able to answer question     Patient calling about labs that were ordered. Yes they are in epic/future.    Protocols used: IN[facility] ONLY [facility]

## 2017-12-22 ENCOUNTER — CARE COORDINATION (OUTPATIENT)
Dept: ONCOLOGY | Facility: CLINIC | Age: 57
End: 2017-12-22

## 2017-12-22 ENCOUNTER — HOSPITAL ENCOUNTER (OUTPATIENT)
Dept: PET IMAGING | Facility: CLINIC | Age: 57
Discharge: HOME | End: 2017-12-22
Payer: COMMERCIAL

## 2017-12-22 DIAGNOSIS — C83.30 DLBCL (DIFFUSE LARGE B CELL LYMPHOMA) (H): ICD-10-CM

## 2017-12-22 DIAGNOSIS — C83.38 DIFFUSE LARGE B-CELL LYMPHOMA OF LYMPH NODES OF MULTIPLE REGIONS (H): ICD-10-CM

## 2017-12-22 LAB
ANION GAP SERPL CALCULATED.3IONS-SCNC: 6 MMOL/L (ref 3–14)
ANISOCYTOSIS BLD QL SMEAR: ABNORMAL
BASOPHILS # BLD AUTO: 0.1 10E9/L (ref 0–0.2)
BASOPHILS NFR BLD AUTO: 0.8 %
BUN SERPL-MCNC: 13 MG/DL (ref 7–30)
CALCIUM SERPL-MCNC: 8.5 MG/DL (ref 8.5–10.1)
CHLORIDE SERPL-SCNC: 98 MMOL/L (ref 94–109)
CO2 SERPL-SCNC: 33 MMOL/L (ref 20–32)
CREAT SERPL-MCNC: 0.9 MG/DL (ref 0.52–1.04)
DACRYOCYTES BLD QL SMEAR: SLIGHT
DIFFERENTIAL METHOD BLD: ABNORMAL
EOSINOPHIL # BLD AUTO: 0 10E9/L (ref 0–0.7)
EOSINOPHIL NFR BLD AUTO: 0 %
ERYTHROCYTE [DISTWIDTH] IN BLOOD BY AUTOMATED COUNT: 22.1 % (ref 10–15)
GFR SERPL CREATININE-BSD FRML MDRD: 65 ML/MIN/1.7M2
GLUCOSE BLDC GLUCOMTR-MCNC: 106 MG/DL (ref 70–99)
GLUCOSE SERPL-MCNC: 99 MG/DL (ref 70–99)
HCT VFR BLD AUTO: 31.9 % (ref 35–47)
HGB BLD-MCNC: 10.1 G/DL (ref 11.7–15.7)
LYMPHOCYTES # BLD AUTO: 0.5 10E9/L (ref 0.8–5.3)
LYMPHOCYTES NFR BLD AUTO: 4.3 %
MCH RBC QN AUTO: 30.6 PG (ref 26.5–33)
MCHC RBC AUTO-ENTMCNC: 31.7 G/DL (ref 31.5–36.5)
MCV RBC AUTO: 97 FL (ref 78–100)
METAMYELOCYTES # BLD: 0.3 10E9/L
METAMYELOCYTES NFR BLD MANUAL: 2.6 %
MONOCYTES # BLD AUTO: 0.6 10E9/L (ref 0–1.3)
MONOCYTES NFR BLD AUTO: 5.2 %
NEUTROPHILS # BLD AUTO: 10.1 10E9/L (ref 1.6–8.3)
NEUTROPHILS NFR BLD AUTO: 84.5 %
NRBC # BLD AUTO: 0.3 10*3/UL
NRBC BLD AUTO-RTO: 3 /100
PLATELET # BLD AUTO: 167 10E9/L (ref 150–450)
PLATELET # BLD EST: ABNORMAL 10*3/UL
POIKILOCYTOSIS BLD QL SMEAR: SLIGHT
POLYCHROMASIA BLD QL SMEAR: ABNORMAL
POTASSIUM SERPL-SCNC: 3.7 MMOL/L (ref 3.4–5.3)
PROMYELOCYTES # BLD MANUAL: 0.3 10E9/L
PROMYELOCYTES NFR BLD MANUAL: 2.6 %
RBC # BLD AUTO: 3.3 10E12/L (ref 3.8–5.2)
SODIUM SERPL-SCNC: 137 MMOL/L (ref 133–144)
WBC # BLD AUTO: 12 10E9/L (ref 4–11)

## 2017-12-22 PROCEDURE — 25000128 H RX IP 250 OP 636

## 2017-12-22 PROCEDURE — A9552 F18 FDG: HCPCS

## 2017-12-22 PROCEDURE — 34300033 ZZH RX 343

## 2017-12-22 PROCEDURE — 80048 BASIC METABOLIC PNL TOTAL CA: CPT | Performed by: PHYSICIAN ASSISTANT

## 2017-12-22 PROCEDURE — 85025 COMPLETE CBC W/AUTO DIFF WBC: CPT | Performed by: PHYSICIAN ASSISTANT

## 2017-12-22 PROCEDURE — 71260 CT THORAX DX C+: CPT

## 2017-12-22 PROCEDURE — 74177 CT ABD & PELVIS W/CONTRAST: CPT

## 2017-12-22 PROCEDURE — 82962 GLUCOSE BLOOD TEST: CPT

## 2017-12-22 RX ORDER — IOPAMIDOL 755 MG/ML
10-140 INJECTION, SOLUTION INTRAVASCULAR ONCE
Status: COMPLETED | OUTPATIENT
Start: 2017-12-22 | End: 2017-12-22

## 2017-12-22 RX ORDER — HEPARIN SODIUM (PORCINE) LOCK FLUSH IV SOLN 100 UNIT/ML 100 UNIT/ML
5 SOLUTION INTRAVENOUS ONCE
Status: COMPLETED | OUTPATIENT
Start: 2017-12-22 | End: 2017-12-22

## 2017-12-22 RX ADMIN — IOPAMIDOL 112 ML: 755 INJECTION, SOLUTION INTRAVENOUS at 07:57

## 2017-12-22 RX ADMIN — FLUDEOXYGLUCOSE F-18 11.71 MCI.: 500 INJECTION, SOLUTION INTRAVENOUS at 06:55

## 2017-12-22 RX ADMIN — SODIUM CHLORIDE, PRESERVATIVE FREE 500 UNITS: 5 INJECTION INTRAVENOUS at 08:20

## 2017-12-22 RX ADMIN — SODIUM CHLORIDE, PRESERVATIVE FREE 5 ML: 5 INJECTION INTRAVENOUS at 09:19

## 2017-12-22 NOTE — PROGRESS NOTES
RN Care Coordination Note  Reason for Outgoing Call:   Kenny Jessica Lizzy, FABIO Barrett, Laila, RN                   Please let her know she is no longer neutropenic. Her creatinine is improving, but not yet back to baseline. Continue to push fluids. Other labs are stable. Thanks.   Jessica     Nursing Action/Patient Instruction:  - Left detailed message for pt at work # re: all above information from Jessica and answered her question from earlier this week about chemo dosing. It will not increase with next cycle based on protocol but stay the same as last cycle due to 1 ANC measurement of less than 0.5 . Also indicated that I will ask scheduling to add a 1630 lab draw appt on 12/17 as per our discussion earlier this week, she wanted to do this to try to expedite chemo start the next morning. I also told her to that she can cancel the appt if she has changed her mind and explained that I will send all info in MyChart message and that her cell number VM box is still full and I cannot leave her messages there as a result.  Laila Barrett, RN, BSN, OCN  Care Coordinator  [facility]

## 2017-12-22 NOTE — NURSING NOTE
Chief Complaint   Patient presents with     Port Draw     Labs drawn by RN from port.      RONNY GARCES RN

## 2017-12-26 ENCOUNTER — APPOINTMENT (OUTPATIENT)
Dept: LAB | Facility: CLINIC | Age: 57
End: 2017-12-26
Payer: COMMERCIAL

## 2017-12-26 ENCOUNTER — OFFICE VISIT (OUTPATIENT)
Dept: TRANSPLANT | Facility: CLINIC | Age: 57
End: 2017-12-26
Payer: COMMERCIAL

## 2017-12-26 VITALS
SYSTOLIC BLOOD PRESSURE: 119 MMHG | OXYGEN SATURATION: 99 % | DIASTOLIC BLOOD PRESSURE: 80 MMHG | TEMPERATURE: 98.2 F | WEIGHT: 188.49 LBS | RESPIRATION RATE: 16 BRPM | BODY MASS INDEX: 32.86 KG/M2 | HEART RATE: 114 BPM

## 2017-12-26 DIAGNOSIS — Z00.6 EXAMINATION OF PARTICIPANT IN CLINICAL TRIAL: ICD-10-CM

## 2017-12-26 DIAGNOSIS — C85.10 HIGH GRADE B-CELL LYMPHOMA (H): ICD-10-CM

## 2017-12-26 LAB
ALBUMIN SERPL-MCNC: 3.2 G/DL (ref 3.4–5)
ALP SERPL-CCNC: 87 U/L (ref 40–150)
ALT SERPL W P-5'-P-CCNC: 28 U/L (ref 0–50)
ANION GAP SERPL CALCULATED.3IONS-SCNC: 9 MMOL/L (ref 3–14)
AST SERPL W P-5'-P-CCNC: 25 U/L (ref 0–45)
BASOPHILS # BLD AUTO: 0 10E9/L (ref 0–0.2)
BASOPHILS NFR BLD AUTO: 0.4 %
BILIRUB SERPL-MCNC: 0.4 MG/DL (ref 0.2–1.3)
BUN SERPL-MCNC: 17 MG/DL (ref 7–30)
CALCIUM SERPL-MCNC: 8.2 MG/DL (ref 8.5–10.1)
CHLORIDE SERPL-SCNC: 103 MMOL/L (ref 94–109)
CO2 SERPL-SCNC: 30 MMOL/L (ref 20–32)
CREAT SERPL-MCNC: 1.08 MG/DL (ref 0.52–1.04)
DIFFERENTIAL METHOD BLD: ABNORMAL
EOSINOPHIL # BLD AUTO: 0 10E9/L (ref 0–0.7)
EOSINOPHIL NFR BLD AUTO: 0.4 %
ERYTHROCYTE [DISTWIDTH] IN BLOOD BY AUTOMATED COUNT: 22.8 % (ref 10–15)
GFR SERPL CREATININE-BSD FRML MDRD: 52 ML/MIN/1.7M2
GLUCOSE SERPL-MCNC: 138 MG/DL (ref 70–99)
HCT VFR BLD AUTO: 32.7 % (ref 35–47)
HGB BLD-MCNC: 10.4 G/DL (ref 11.7–15.7)
IMM GRANULOCYTES # BLD: 0.2 10E9/L (ref 0–0.4)
IMM GRANULOCYTES NFR BLD: 1.4 %
LYMPHOCYTES # BLD AUTO: 0.5 10E9/L (ref 0.8–5.3)
LYMPHOCYTES NFR BLD AUTO: 4.3 %
MCH RBC QN AUTO: 31.9 PG (ref 26.5–33)
MCHC RBC AUTO-ENTMCNC: 31.8 G/DL (ref 31.5–36.5)
MCV RBC AUTO: 100 FL (ref 78–100)
MONOCYTES # BLD AUTO: 0.9 10E9/L (ref 0–1.3)
MONOCYTES NFR BLD AUTO: 8.8 %
NEUTROPHILS # BLD AUTO: 9 10E9/L (ref 1.6–8.3)
NEUTROPHILS NFR BLD AUTO: 84.7 %
NRBC # BLD AUTO: 0 10*3/UL
NRBC BLD AUTO-RTO: 0 /100
PLATELET # BLD AUTO: 249 10E9/L (ref 150–450)
POTASSIUM SERPL-SCNC: 3.8 MMOL/L (ref 3.4–5.3)
PROT SERPL-MCNC: 6.5 G/DL (ref 6.8–8.8)
RBC # BLD AUTO: 3.26 10E12/L (ref 3.8–5.2)
SODIUM SERPL-SCNC: 142 MMOL/L (ref 133–144)
TROPONIN I SERPL-MCNC: <0.015 UG/L (ref 0–0.04)
WBC # BLD AUTO: 10.6 10E9/L (ref 4–11)

## 2017-12-26 PROCEDURE — 99213 OFFICE O/P EST LOW 20 MIN: CPT | Mod: ZF

## 2017-12-26 PROCEDURE — 80053 COMPREHEN METABOLIC PANEL: CPT | Performed by: PHYSICIAN ASSISTANT

## 2017-12-26 PROCEDURE — 85025 COMPLETE CBC W/AUTO DIFF WBC: CPT | Performed by: PHYSICIAN ASSISTANT

## 2017-12-26 PROCEDURE — 40000269 ZZH STATISTIC NO CHARGE FACILITY FEE

## 2017-12-26 PROCEDURE — 84484 ASSAY OF TROPONIN QUANT: CPT | Performed by: PHYSICIAN ASSISTANT

## 2017-12-26 PROCEDURE — 25000128 H RX IP 250 OP 636: Mod: ZF

## 2017-12-26 RX ORDER — DIPHENHYDRAMINE HYDROCHLORIDE 50 MG/ML
50 INJECTION INTRAMUSCULAR; INTRAVENOUS
Status: CANCELLED
Start: 2017-12-28

## 2017-12-26 RX ORDER — SODIUM CHLORIDE 9 MG/ML
1000 INJECTION, SOLUTION INTRAVENOUS CONTINUOUS PRN
Status: CANCELLED
Start: 2017-12-28

## 2017-12-26 RX ORDER — ALLOPURINOL 300 MG/1
300 TABLET ORAL DAILY
Status: CANCELLED | OUTPATIENT
Start: 2017-12-28

## 2017-12-26 RX ORDER — AMOXICILLIN 250 MG
2 CAPSULE ORAL 2 TIMES DAILY
Status: CANCELLED | OUTPATIENT
Start: 2017-12-28

## 2017-12-26 RX ORDER — PROCHLORPERAZINE MALEATE 10 MG
10 TABLET ORAL EVERY 6 HOURS PRN
Status: CANCELLED
Start: 2017-12-28

## 2017-12-26 RX ORDER — DIPHENHYDRAMINE HCL 25 MG
50 CAPSULE ORAL ONCE
Status: CANCELLED
Start: 2017-12-28

## 2017-12-26 RX ORDER — METHYLPREDNISOLONE SODIUM SUCCINATE 125 MG/2ML
125 INJECTION, POWDER, LYOPHILIZED, FOR SOLUTION INTRAMUSCULAR; INTRAVENOUS
Status: CANCELLED
Start: 2017-12-28

## 2017-12-26 RX ORDER — ACETAMINOPHEN 325 MG/1
650 TABLET ORAL ONCE
Status: CANCELLED
Start: 2017-12-28

## 2017-12-26 RX ORDER — MEPERIDINE HYDROCHLORIDE 25 MG/ML
25 INJECTION INTRAMUSCULAR; INTRAVENOUS; SUBCUTANEOUS EVERY 30 MIN PRN
Status: CANCELLED | OUTPATIENT
Start: 2017-12-28

## 2017-12-26 RX ORDER — DEXAMETHASONE 4 MG/1
8 TABLET ORAL DAILY
Status: CANCELLED
Start: 2018-01-03

## 2017-12-26 RX ORDER — EPINEPHRINE 1 MG/ML
0.3 INJECTION, SOLUTION, CONCENTRATE INTRAVENOUS EVERY 5 MIN PRN
Status: CANCELLED | OUTPATIENT
Start: 2017-12-28

## 2017-12-26 RX ORDER — ALBUTEROL SULFATE 90 UG/1
1-2 AEROSOL, METERED RESPIRATORY (INHALATION)
Status: CANCELLED
Start: 2017-12-28

## 2017-12-26 RX ORDER — HEPARIN SODIUM (PORCINE) LOCK FLUSH IV SOLN 100 UNIT/ML 100 UNIT/ML
5 SOLUTION INTRAVENOUS
Status: DISCONTINUED | OUTPATIENT
Start: 2017-12-26 | End: 2017-12-29 | Stop reason: HOSPADM

## 2017-12-26 RX ORDER — LORAZEPAM 2 MG/ML
.5-1 INJECTION INTRAMUSCULAR EVERY 6 HOURS PRN
Status: CANCELLED | OUTPATIENT
Start: 2017-12-28

## 2017-12-26 RX ORDER — ALBUTEROL SULFATE 0.83 MG/ML
2.5 SOLUTION RESPIRATORY (INHALATION)
Status: CANCELLED | OUTPATIENT
Start: 2017-12-28

## 2017-12-26 RX ORDER — LORAZEPAM 0.5 MG/1
.5-1 TABLET ORAL EVERY 6 HOURS PRN
Status: CANCELLED
Start: 2017-12-28

## 2017-12-26 RX ORDER — ONDANSETRON 8 MG/1
16 TABLET, FILM COATED ORAL EVERY 24 HOURS
Status: CANCELLED
Start: 2017-12-29

## 2017-12-26 RX ADMIN — SODIUM CHLORIDE, PRESERVATIVE FREE 5 ML: 5 INJECTION INTRAVENOUS at 13:51

## 2017-12-26 ASSESSMENT — PAIN SCALES - GENERAL: PAINLEVEL: EXTREME PAIN (8)

## 2017-12-26 NOTE — PROGRESS NOTES
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 She is currently undergoing treatment with DA-R-EPOCH. Her past medical history is significant for breast cancer in 2011 s/p bilateral mastectomies and BENJIE/BSO up until recently on Tamoxifen, papillary thyroid cancer s/p total thyroidectomy, chronic chest wall pain, and asthma. Briefly, her oncologic history is as follows:    She presented in 8/2017 with dramatically worse chest and back pain. CT 9/1/17 showed lytic lesion right 10th rib extending to right T9-10 neural foramen with vertebral body invasion. There was associated conglomerate of lymphadenopathy around the mid T-spine. She had a CT guided biopsy showing B-cell high grade lymphoma. Pathology showed \"The morphology shows a population of large cells, diffuse lymphoid infiltrate. The cells are atypical with abundant mitotic and apoptotic activity and single cell necrosis. Tumor is CD45 and CD79a positive and focally weakly CD30 positive. The other stains revealed positivity for CD20, BCL6, BCL2 and MUM1, and CD10 and CD21 negative. The CD5 and CD3 highlighted background T-cells.  MYC expression was equivocal with approximately 40% nuclei staining.  Ki-67 proliferative index is greater than 90-95%. The immunohistochemistry is suggestive of non-GCB immunophenotype of diffuse large B-cell lymphoma. The cytogenetics did not show rearrangement of the BCL2 or c-MYC. There is the presence of BCL6 rearrangement in 78% of cells and gains of MYC and BCL2, but no amplifications.\"     Staging LP and BMBx were negative for lymphoma.   She started DA-REPOCH 10/6/17. She did well with chemo other than rigors during [facility].  Post cycle 1 complicated by mucositis and worsening of chronic LE peripheral edema.   Cycle 2 on 10/27/17. Due to a rise in her LD and uric acid " levels on that admission day, she underwent a CT scan which showed response to therapy with improvement in her mediastinal lymphadenopathy and right chest wall mass.  Cycle 3 on 11/16      Cycle 4 12/7    Interval History:  She reports that overall since hospital discharge she has been doing okay. Does complain of discomfort around her port and under her breast. Reports that she recovered from this cycle of chemo more quickly than before.       Current Outpatient Prescriptions   Medication Sig Dispense Refill     prochlorperazine (COMPAZINE) 10 MG tablet Take 10 mg by mouth as needed  3     SUMAtriptan (IMITREX) 50 MG tablet Take 50 mg by mouth as needed  3     cromolyn sodium (NASALCROM) 5.2 MG/ACT AERS Inhaler SPRAY ONE SPRAY( 1 ML) IN NOSTRIL DAILY 1 Bottle 6     acetaminophen (TYLENOL) 325 MG tablet Take 2 tablets (650 mg) by mouth every 4 hours as needed for mild pain or fever 100 tablet      ondansetron (ZOFRAN-ODT) 8 MG ODT tab Take 1 tablet (8 mg) by mouth every 8 hours as needed 30 tablet 0     fluconazole (DIFLUCAN) 200 MG tablet Take 1 tablet (200 mg) by mouth daily 30 tablet 0     cetirizine (ZYRTEC ALLERGY) 10 MG tablet Take 1 tablet (10 mg) by mouth 3 times daily On hold for lab test. 30 tablet 0     acyclovir (ZOVIRAX) 400 MG tablet Take 1 tablet (400 mg) by mouth 2 times daily 60 tablet 0     dexamethasone (DECADRON) 4 MG tablet Take 2 tablets (8 mg) by mouth daily for 2 days 4 tablet 0     diclofenac (VOLTAREN) 1 % GEL topical gel Apply affected area two times daily PRN using enclosed dosing card. 100 g 0     levofloxacin (LEVAQUIN) 250 MG tablet Take 1 tablet (250 mg) by mouth daily 30 tablet 0     allopurinol (ZYLOPRIM) 300 MG tablet Take 1 tablet (300 mg) by mouth daily 30 tablet 0     ferrous sulfate (IRON) 325 (65 FE) MG tablet Take 1 tablet (325 mg) by mouth 3 times daily (with meals) 90 tablet 0     cyclobenzaprine (FLEXERIL) 10 MG tablet Take 1 tablet (10 mg) by mouth 3 times daily as needed  30 tablet 0     levothyroxine (SYNTHROID/LEVOTHROID) 112 MCG tablet Take 3 tablets (336 mcg) by mouth once a week 30 tablet      [employer] FIBER GUMMY BEARS CHILDREN CHEW Take 5 g by mouth daily (2 gummy= 5 g =1 serving)       triamcinolone (KENALOG) 0.025 % ointment Apply topically as needed  3     morphine (MS CONTIN) 30 MG 12 hr tablet Take 1 tablet (30 mg) by mouth every 8 hours . Take an addition pill at night such that your evening dose is 60mg 120 tablet 0     oxyCODONE IR (ROXICODONE) 30 MG tablet Take 1 tablet (30 mg) by mouth every 4 hours as needed for moderate to severe pain 180 tablet 0     lidocaine (XYLOCAINE) 5 % ointment Apply quarter size amount to chest and back up to 3 times daily as needed for pain. 350 g 1     montelukast (SINGULAIR) 10 MG tablet Take 1 tablet (10 mg) by mouth 2 times daily (Patient taking differently: Take 20 mg by mouth 2 times daily ) 60 tablet 0     furosemide (LASIX) 20 MG tablet Take 1 tablet (20 mg) by mouth 2 times daily 60 tablet 0     magic mouthwash suspension (diphenhydrAMINE, lidocaine, aluminum-magnesium & simethicone) Swish and spit 10 mLs in mouth every 6 hours as needed for mouth sores 360 mL 1     bisacodyl (DULCOLAX) 5 MG EC tablet Take 3 tablets (15 mg) by mouth daily as needed for constipation 30 tablet 0     polyethylene glycol (MIRALAX) powder Take 17 g (1 capful) by mouth daily 510 g 0     senna-docusate (SENOKOT-S;PERICOLACE) 8.6-50 MG per tablet Take 1-2 tablets by mouth 2 times daily as needed for constipation 60 tablet 0     UNABLE TO FIND Take 2 capsules by mouth 3 times daily Muscle Mag. 2 caps contain B1 20mg, B2 20mg, B6 10mg, magesium 20mg, manganese 2mg.       Morphine Sulfate (MS CONTIN PO) Take 60 mg by mouth daily Takes at 8pm       lidocaine-prilocaine (EMLA) cream Apply topically as needed (port access pain.) 30 g 1     calcitRIOL (ROCALTROL) 0.25 MCG capsule TAKE 2 CAPSULES BY MOUTH EVERY MORNING AND TAKE 1 CAPSULE BY MOUTH EVERY NIGHT AT  BEDTIME (Patient taking differently: TAKE 2 CAPSULES BY MOUTH EVERY MORNING AND TAKE 1 CAPSULE BY MOUTH at noon) 270 capsule 3     celecoxib (CELEBREX) 200 MG capsule TAKE ONE CAPSULE BY MOUTH TWICE DAILY  56 capsule 0     order for DME Compression stockings, medium grade, please measure and fit. Please dispense a pair. (Patient not taking: Reported on 12/13/2017) 1 Units 0     methocarbamol (ROBAXIN) 750 MG tablet Take 1 tablet (750 mg) by mouth 4 times daily as needed . Ok to take a 5th Robaxin on very severe days. (Patient taking differently: Take 750 mg by mouth 4 times daily . Ok to take a 5th Robaxin on very severe days.) 360 tablet 1     COMPOUND CONTAINING CONTROLLED SUBSTANCE (CMPD RX) - PHARMACY TO MIX COMPOUNDED MEDICATION Apply small amount to affected area two times daily. (Patient not taking: Reported on 11/27/2017) 120 g 6     diazepam (VALIUM) 5 MG tablet Take 1 tablet (5 mg) by mouth 3 times daily as needed For muscle spasms (Patient taking differently: Take 5 mg by mouth 3 times daily For muscle spasms) 90 tablet 2     levothyroxine (SYNTHROID/LEVOTHROID) 112 MCG tablet Mon-Sat: (2 tabs daily) Sunday: 3 tabs (Patient taking differently: 112 mcg Mon-Sat: (2 tabs daily) Sunday: 3 tabs) 189 tablet 3     hydrochlorothiazide (MICROZIDE) 12.5 MG capsule Take 1 capsule (12.5 mg) by mouth daily 90 capsule 2     EPINEPHrine (EPIPEN 2-CARIADD) 0.3 MG/0.3ML injection Inject 0.3 mLs (0.3 mg) into the muscle once as needed for anaphylaxis (Patient not taking: Reported on 12/7/2017) 0.6 mL 3     potassium chloride SA (POTASSIUM CHLORIDE) 20 MEQ CR tablet Take 1 tablet (20 mEq) by mouth daily 90 tablet 3     VENTOLIN  (90 BASE) MCG/ACT Inhaler INHALE 2 PUFFS INTO THE LUNGS EVERY 4 HOURS AS NEEDED FOR SHORTNESS OF BREATH OR DIFFICULT BREATHING OR WHEEZING (Patient not taking: Reported on 12/7/2017) 18 g 3     cromolyn (OPTICROM) 4 % ophthalmic solution Place 1 drop into both eyes 4 times daily 10 mL 5      Cholecalciferol (VITAMIN D3) 2000 UNITS CAPS Take 5,000 Units by mouth daily Takes 2 tabs daily 60 capsule 4     order for DME Equipment being ordered: compression stockings. 20-30 mm or 30 - 40 mm as patient can tolerate. Physical therapy to determine if they should be above or below the knee. (Patient not taking: Reported on 12/13/2017) 2 Units 4     order for DME Equipment being ordered: compression bra (Patient not taking: Reported on 12/7/2017) 2 Device 1     ranitidine (ZANTAC) 75 MG tablet Take 1 tablet (75 mg) by mouth 3 times daily 270 tablet 3     aluminum chloride (DRYSOL) 20 % external solution Apply topically At Bedtime 60 mL 3     UNABLE TO FIND MEDICATION NAME: Tumeric 3 capsules daily (on hold during chemo)       UNABLE TO FIND 2 tablets 3 times daily MEDICATION NAME: Natural D-Hist (on hold during chemo)       calcium citrate-vitamin D (CALCIUM CITRATE +D) 315-250 MG-UNIT TABS Take 2 tablets by mouth twice a week  120 tablet      calcium carbonate (TUMS) 500 MG chewable tablet Take 2 tablets (1,000 mg) by mouth nightly as needed for other (cramps) 180 chew tab 3     UNABLE TO FIND 3 tablets 3 times daily MEDICATION NAME: calcium D-Glucarate   3 caps contain 180mg of elemental calcium.       Triamcinolone Acetonide (AZMACORT IN) Inhale 2 puffs into the lungs as needed       UNABLE TO FIND 2 tablets 3 times daily MEDICATION NAME: Digestzymes        UNABLE TO FIND 1 tablet daily MEDICATION NAME: Pure Encapsulations       Probiotic Product (PROBIOTIC DAILY PO) Take 1 capsule by mouth daily Lacto acid bifidobacterium       Physical Examination:  /80  Pulse 114  Temp 98.2  F (36.8  C) (Oral)  Resp 16  Wt 85.5 kg (188 lb 7.9 oz)  SpO2 99%  BMI 32.86 kg/m2  Wt Readings from Last 10 Encounters:   12/26/17 85.5 kg (188 lb 7.9 oz)   12/19/17 83.8 kg (184 lb 11.2 oz)   12/15/17 84.9 kg (187 lb 3.2 oz)   12/13/17 88.2 kg (194 lb 6.4 oz)   12/11/17 88.4 kg (194 lb 14.4 oz)   12/07/17 86.3 kg (190 lb  4.8 oz)   11/28/17 84.3 kg (185 lb 12.8 oz)   11/28/17 84.3 kg (185 lb 12.8 oz)   11/27/17 83 kg (182 lb 14.4 oz)   11/22/17 83.8 kg (184 lb 11.2 oz)   Constitutional: NAD  Eyes:  PERRL. No scleral icterus.  ENT: Oral mucosa is moist without lesions or thrush.   Cardiovascular: Regular rate and rhythm. Port accessed without surrounding erythema or drainage  Respiratory: Clear to auscultation bilaterally. No wheezes or crackles.  Gastrointestinal: Bowel sounds +. Soft, nontender  Neurologic: normal speech, up to exam table unaided  Skin:No rashes noted.   Extremities: trace lower extremity edema bilaterally, compression stockings in place.    Laboratory Data:  Results for orders placed or performed in visit on 12/26/17 (from the past 24 hour(s))   CBC with platelets differential   Result Value Ref Range    WBC 10.6 4.0 - 11.0 10e9/L    RBC Count 3.26 (L) 3.8 - 5.2 10e12/L    Hemoglobin 10.4 (L) 11.7 - 15.7 g/dL    Hematocrit 32.7 (L) 35.0 - 47.0 %     78 - 100 fl    MCH 31.9 26.5 - 33.0 pg    MCHC 31.8 31.5 - 36.5 g/dL    RDW 22.8 (H) 10.0 - 15.0 %    Platelet Count 249 150 - 450 10e9/L    Diff Method Automated Method     % Neutrophils 84.7 %    % Lymphocytes 4.3 %    % Monocytes 8.8 %    % Eosinophils 0.4 %    % Basophils 0.4 %    % Immature Granulocytes 1.4 %    Nucleated RBCs 0 0 /100    Absolute Neutrophil 9.0 (H) 1.6 - 8.3 10e9/L    Absolute Lymphocytes 0.5 (L) 0.8 - 5.3 10e9/L    Absolute Monocytes 0.9 0.0 - 1.3 10e9/L    Absolute Eosinophils 0.0 0.0 - 0.7 10e9/L    Absolute Basophils 0.0 0.0 - 0.2 10e9/L    Abs Immature Granulocytes 0.2 0 - 0.4 10e9/L    Absolute Nucleated RBC 0.0    Comprehensive metabolic panel   Result Value Ref Range    Sodium 142 133 - 144 mmol/L    Potassium 3.8 3.4 - 5.3 mmol/L    Chloride 103 94 - 109 mmol/L    Carbon Dioxide 30 20 - 32 mmol/L    Anion Gap 9 3 - 14 mmol/L    Glucose 138 (H) 70 - 99 mg/dL    Urea Nitrogen 17 7 - 30 mg/dL    Creatinine 1.08 (H) 0.52 - 1.04 mg/dL  "   GFR Estimate 52 (L) >60 mL/min/1.7m2    GFR Estimate If Black 63 >60 mL/min/1.7m2    Calcium 8.2 (L) 8.5 - 10.1 mg/dL    Bilirubin Total 0.4 0.2 - 1.3 mg/dL    Albumin 3.2 (L) 3.4 - 5.0 g/dL    Protein Total 6.5 (L) 6.8 - 8.8 g/dL    Alkaline Phosphatase 87 40 - 150 U/L    ALT 28 0 - 50 U/L    AST 25 0 - 45 U/L   Troponin I   Result Value Ref Range    Troponin I ES <0.015 0.000 - 0.045 ug/L     *Note: Due to a large number of results and/or encounters for the requested time period, some results have not been displayed. A complete set of results can be found in Results Review.   PET scan 12/21  IMPRESSION:   1. In this patient with a history of diffuse large B-cell lymphoma,  breast cancer and papillary thyroid carcinoma there is evidence for  partial or complete response;      a. Markedly decreased size of right paravertebral soft tissue mass  involving the right 10th rib which is no longer metabolically active.      b. Decreased size of left mediastinal lymph node which is no  longer metabolically active.      c. No new lesions concerning for recurrence or metastasis.      d. Diffuse bone marrow FDG uptake likely related to bone marrow  reactivation.  2. Stable intra and extrahepatic biliary dilatation. There was no  laboratory correlation for obstruction on 12/19/2017. This most likely  represents reservoir effect status post cholecystectomy. There is a  surgical clip or stone at the cystic duct stump.  3. Moderate stool burden with fecalization of the contents within the  distal small bowel.  4. Nonobstructing 2 mm right renal stone.      Assessment and Plan:    DLBCL, \"double-hit\"-like, with c-myc overexpression but not re-arrangement. currently on treatment with DA-R-EPOCH  S/p 4 cycles   Complication include mild mucositis and mild infusion reaction (rigors), nausea and fatigue. ONly needed PRBC once. No infecions.     Interval CT CAP after 1 cycle due to elevated LD and uric acid showed response to " treatment.  PET scan with CR with no PET avidity on 12/21  Recommended that she take zofran TID to prevent nausea    Will need hospital admissions for DA-R-EPOCH on 12/28, and 1/18    Heme  No transfusion needs today. Continue to monitor with twice weekly labs and transfuse if Hgb<8 and Plt<10k.      ID  Continue ppx ACV 400mg BID, levaquin 250mg daily, and fluconazole 200mg daily.     History of stage 1, ER/VT+, HER2- breast cancer s/p bilateral mastectomies and BENJIE/BSO  Follows with Dr. Crawley. Oncotype recurrence score 11%. Was initially on Arimidex ([id number]) but changed to Tamoxifen due to arthralgias. Tamoxifen held since 10/5 and continue to hold with chemotherapy.     History of papillary thyroid cancer, s/p total thyroidectomy  Follows with Dr. Castro. Has post surgical hypothyroidism. Continue levothyroxine and calcitriol as prescribed. Neck u/s on 11/2 shows no evidence of disease.     History of Rachael en Y gastric bypass  Continue supplements (calcium citrate-vitamin D, vitamin D3, magnesium citrate). Also has iron deficiency anemia likely from poor absorption and per Dr. Saucdea, she should decrease Fe supplementation to  325mg PO ferrous sulfate QD. She should take stool softeners if issues with iron induced constipation.     Chronic chest wall pain s/p mastectomies  Follows with Dr. Benitez. Palliative care to continue to prescribe pain medications. Taking MS Contin, Oxycodone for breakthrough pain, and celebrex. Continued to encourage pt to wean down on pain meds as tolerated.   Continues to have some pain around port but no evidence of infection or dysfunction.     Gerri Kessler MD   Seen with Dr. Sauceda      Today, I  saw and examined the patient independently in clinic and discussed the recommendations. I reviewed the case with the fellow and agree with the note as above.     Garima Sauceda MD

## 2017-12-26 NOTE — MR AVS SNAPSHOT
After Visit Summary   12/26/2017    Tisha Arias    MRN: [id number]           Patient Information     Date Of Birth          1960        Visit Information        Provider Department      12/26/2017 2:00 PM Garima Sauceda MD [facility] Blood and Marrow Transplant        Today's Diagnoses     High grade B-cell lymphoma (H)        Examination of participant in clinical trial              [facility] ([facility])  [address] [place] [id number]  Phone: [phone]  Clinic Hours:   Monday-Thursday:7am to 7pm   Friday: 7am to 5pm   Weekends and holidays:    8am to noon (in general)  If your fever is 100.5  or greater,   call the clinic.  After hours call the   hospital at [phone] or   [phone]. Ask for the BMT   fellow on-call            Follow-ups after your visit        Your next 10 appointments already scheduled     Jan 18, 2018  8:30 AM CST   (Arrive by 8:15 AM)   Return Visit with COTY Jessica   [facility] ([facility])    [address] [place] [id number] [phone]            Mar 06, 2018  5:00 PM CST   RETURN ONC with Garima Sauceda MD   [facility] Blood and Marrow Transplant ([facility])    [address] [place] [id number] [phone]            May 21, 2018  4:20 PM CDT   (Arrive by 4:05 PM)   RETURN ENDOCRINE with Avelina Castro MD   [facility] Endocrinology ([facility])    [address] [place] [address]  [address] [facility] [id number] [phone]            Jun 11, 2018  4:00 PM CDT   (Arrive by 3:45 PM)   Return Visit with Shahla Crawley MD   [facility] ([facility])    [address] [place] [id number] [phone]              Who to contact     If you have questions or need follow up information about  today's clinic visit or your schedule please contact [facility] B[facility]ARR[facility] TRANSPL[facility] directly at [phone].  Normal or non-critical lab and imaging results will be communicated to you by [employer], letter or phone within 4 business days after the clinic has received the results. If you do not hear from us within 7 days, please contact the clinic through [employer] or phone. If you have a critical or abnormal lab result, we will notify you by phone as soon as possible.  Submit refill requests through [employer] or call your pharmacy and they will forward the refill request to us. Please allow 3 business days for your refill to be completed.          Additional Information About Your Visit        [employer] Information     [employer] gives you secure access to your electronic health record. If you see a primary care provider, you can also send messages to your care team and make appointments. If you have questions, please call your primary care clinic.  If you do not have a primary care provider, please call [phone] and they will assist you.        Care EveryWhere ID     This is your Care EveryWhere ID. This could be used by other organizations to access your [facility] medical records  [id number]        Your Vitals Were     Pulse Temperature Respirations Pulse Oximetry BMI (Body Mass Index)       114 98.2  F (36.8  C) (Oral) 16 99% 32.86 kg/m2        Blood Pressure from Last 3 Encounters:   12/29/17 122/62   12/26/17 119/80   12/19/17 130/83    Weight from Last 3 Encounters:   12/28/17 84.9 kg (187 lb 3.2 oz)   12/26/17 85.5 kg (188 lb 7.9 oz)   12/19/17 83.8 kg (184 lb 11.2 oz)              We Performed the Following     CBC with platelets differential     Comprehensive metabolic panel     Troponin I          Today's Medication Changes          These changes are accurate as of: 12/26/17 11:59 PM.  If you have any questions, ask your nurse or doctor.               These medicines have changed or have updated  prescriptions.        Dose/Directions    calcitRIOL 0.25 MCG capsule   Commonly known as:  ROCALTROL   This may have changed:  additional instructions   Used for:  Postsurgical hypothyroidism, Papillary carcinoma, follicular variant (H), Metastasis to cervical lymph node (H)        TAKE 2 CAPSULES BY MOUTH EVERY MORNING AND TAKE 1 CAPSULE BY MOUTH EVERY NIGHT AT BEDTIME   Quantity:  270 capsule   Refills:  3       diazepam 5 MG tablet   Commonly known as:  VALIUM   This may have changed:    - when to take this  - additional instructions   Used for:  Chest wall pain        Dose:  5 mg   Take 1 tablet (5 mg) by mouth 3 times daily as needed For muscle spasms   Quantity:  90 tablet   Refills:  2       * levothyroxine 112 MCG tablet   Commonly known as:  SYNTHROID/LEVOTHROID   This may have changed:    - how much to take  - additional instructions   Used for:  Postsurgical hypothyroidism, Papillary carcinoma, follicular variant (H), Postsurgical hypoparathyroidism (H), Thyroid cancer (H)   Changed by:  Avelina Castro MD        Mon-Sat: (2 tabs daily) Sunday: 3 tabs   Quantity:  189 tablet   Refills:  3       * levothyroxine 112 MCG tablet   Commonly known as:  SYNTHROID/LEVOTHROID   This may have changed:  Another medication with the same name was changed. Make sure you understand how and when to take each.   Used for:  High grade B-cell lymphoma (H)        Dose:  336 mcg   Take 3 tablets (336 mcg) by mouth once a week   Quantity:  30 tablet   Refills:  0       methocarbamol 750 MG tablet   Commonly known as:  ROBAXIN   This may have changed:    - when to take this  - additional instructions   Used for:  Myofascial pain        Dose:  750 mg   Take 1 tablet (750 mg) by mouth 4 times daily as needed . Ok to take a 5th Robaxin on very severe days.   Quantity:  360 tablet   Refills:  1       montelukast 10 MG tablet   Commonly known as:  SINGULAIR   This may have changed:  how much to take   Used for:  Idiopathic mast  cell activation syndrome (H)        Dose:  10 mg   Take 1 tablet (10 mg) by mouth 2 times daily   Quantity:  60 tablet   Refills:  0       * Notice:  This list has 2 medication(s) that are the same as other medications prescribed for you. Read the directions carefully, and ask your doctor or other care provider to review them with you.             Recent Review Flowsheet Data     BMT Recent Results Latest Ref Rng & Units 12/19/2017 12/22/2017 12/22/2017 12/26/2017 12/27/2017 12/28/2017 12/29/2017    WBC 4.0 - 11.0 10e9/L 1.1(L) - 12.0(H) 10.6 8.7 9.1 9.8    Hemoglobin 11.7 - 15.7 g/dL 10.0(L) - 10.1(L) 10.4(L) 11.0(L) 9.9(L) 9.5(L)    Platelet Count 150 - 450 10e9/L 111(L) - 167 249 305 279 277    Neutrophils (Absolute) 1.6 - 8.3 10e9/L 0.3(LL) - 10.1(H) 9.0(H) 7.0 8.6(H) 8.9(H)    INR 0.86 - 1.14 - - - - - 0.99 -    Sodium 133 - 144 mmol/L 140 - 137 142 139 141 141    Potassium 3.4 - 5.3 mmol/L 3.5 - 3.7 3.8 3.6 3.7 3.9    Chloride 94 - 109 mmol/L 102 - 98 103 100 103 106    Glucose 70 - 99 mg/dL 93 106(H) 99 138(H) 108(H) 119(H) 138(H)    Urea Nitrogen 7 - 30 mg/dL 17 - 13 17 17 18 16    Creatinine 0.52 - 1.04 mg/dL 1.05(H) - 0.90 1.08(H) 0.98 0.96 0.76    Calcium (Total) 8.5 - 10.1 mg/dL 8.3(L) - 8.5 8.2(L) 8.6 8.5 8.3(L)    Protein (Total) 6.8 - 8.8 g/dL 6.2(L) - - 6.5(L) 6.9 6.0(L) -    Albumin 3.4 - 5.0 g/dL 3.1(L) - - 3.2(L) 3.5 3.0(L) -    Alkaline Phosphatase 40 - 150 U/L 73 - - 87 93 75 -    AST 0 - 45 U/L 14 - - 25 26 18 -    ALT 0 - 50 U/L 29 - - 28 31 29 -    MCV 78 - 100 fl 95 - 97 100 101(H) 100 100               Primary Care Provider Office Phone # Fax #    Shahla Raul Crawley [phone]       [address] [facility] [address]  [facility] [address]        Equal Access to Services     [facility]: Kevin Venegas, girish ge, ranjan south. [facility] [phone].    ATENCIÓN: Si vaibhav estrada a stiles disposición  servicios gratuitos de asistencia lingüística. Oneil prince [phone].    We comply with applicable federal civil rights laws and [place] laws. We do not discriminate on the basis of race, color, national origin, age, disability, sex, sexual orientation, or gender identity.            Thank you!     Thank you for choosing [facility]ANS[facility]  for your care. Our goal is always to provide you with excellent care. Hearing back from our patients is one way we can continue to improve our services. Please take a few minutes to complete the written survey that you may receive in the mail after your visit with us. Thank you!             Your Updated Medication List - Protect others around you: Learn how to safely use, store and throw away your medicines at www.disposemymeds.org.          This list is accurate as of: 12/26/17 11:59 PM.  Always use your most recent med list.                   Brand Name Dispense Instructions for use Diagnosis    acetaminophen 325 MG tablet    TYLENOL    100 tablet    Take 2 tablets (650 mg) by mouth every 4 hours as needed for mild pain or fever    High grade B-cell lymphoma (H)       acyclovir 400 MG tablet    ZOVIRAX    60 tablet    Take 1 tablet (400 mg) by mouth 2 times daily    High grade B-cell lymphoma (H)       allopurinol 300 MG tablet    ZYLOPRIM    30 tablet    Take 1 tablet (300 mg) by mouth daily    High grade B-cell lymphoma (H)       aluminum chloride 20 % external solution    DRYSOL    60 mL    Apply topically At Bedtime    Rash, Intertrigo       AZMACORT IN      Inhale 2 puffs into the lungs as needed        bisacodyl 5 MG EC tablet     30 tablet    Take 3 tablets (15 mg) by mouth daily as needed for constipation    Constipation, unspecified constipation type       calcitRIOL 0.25 MCG capsule    ROCALTROL    270 capsule    TAKE 2 CAPSULES BY MOUTH EVERY MORNING AND TAKE 1 CAPSULE BY MOUTH EVERY NIGHT AT BEDTIME    Postsurgical hypothyroidism, Papillary  carcinoma, follicular variant (H), Metastasis to cervical lymph node (H)       CALCIUM CITRATE +D 315-250 MG-UNIT Tabs per tablet   Generic drug:  calcium citrate-vitamin D     120 tablet    Take 2 tablets by mouth twice a week        celecoxib 200 MG capsule    celeBREX    56 capsule    TAKE ONE CAPSULE BY MOUTH TWICE DAILY    Chest wall pain       cetirizine 10 MG tablet    ZYRTEC ALLERGY    30 tablet    Take 1 tablet (10 mg) by mouth 3 times daily On hold for lab test.    High grade B-cell lymphoma (H)       COMPOUND CONTAINING CONTROLLED SUBSTANCE - PHARMACY TO MIX COMPOUNDED MEDICATION    CMPD RX    120 g    Apply small amount to affected area two times daily.    Neoplasm related pain       cromolyn 4 % ophthalmic solution    OPTICROM    10 mL    Place 1 drop into both eyes 4 times daily    Idiopathic mast cell activation syndrome (H)       cromolyn sodium 5.2 MG/ACT Aers Inhaler    NASALCROM    1 Bottle    SPRAY ONE SPRAY( 1 ML) IN NOSTRIL DAILY    Mast cell disease, systemic       [employer]ILDR[employer] Chew      Take 5 g by mouth daily (2 gummy= 5 g =1 serving)    High grade B-cell lymphoma (H)       cyclobenzaprine 10 MG tablet    FLEXERIL    30 tablet    Take 1 tablet (10 mg) by mouth 3 times daily as needed    High grade B-cell lymphoma (H)       dexamethasone 4 MG tablet    DECADRON    4 tablet    Take 2 tablets (8 mg) by mouth daily for 2 days    High grade B-cell lymphoma (H)       diazepam 5 MG tablet    VALIUM    90 tablet    Take 1 tablet (5 mg) by mouth 3 times daily as needed For muscle spasms    Chest wall pain       diclofenac 1 % Gel topical gel    VOLTAREN    100 g    Apply affected area two times daily PRN using enclosed dosing card.    Myofascial pain       EPINEPHrine 0.3 MG/0.3ML injection 2-pack    EPIPEN 2-CARIDAD    0.6 mL    Inject 0.3 mLs (0.3 mg) into the muscle once as needed for anaphylaxis        ferrous sulfate 325 (65 FE) MG tablet    IRON    90 tablet    Take 1 tablet (325  mg) by mouth 3 times daily (with meals)    Status post bariatric surgery       fluconazole 200 MG tablet    DIFLUCAN    30 tablet    Take 1 tablet (200 mg) by mouth daily    High grade B-cell lymphoma (H)       furosemide 20 MG tablet    LASIX    60 tablet    Take 1 tablet (20 mg) by mouth 2 times daily    Localized edema       hydrochlorothiazide 12.5 MG capsule    MICROZIDE    90 capsule    Take 1 capsule (12.5 mg) by mouth daily    Hypocalcemia       levofloxacin 250 MG tablet    LEVAQUIN    30 tablet    Take 1 tablet (250 mg) by mouth daily    High grade B-cell lymphoma (H)       * levothyroxine 112 MCG tablet    SYNTHROID/LEVOTHROID    189 tablet    Mon-Sat: (2 tabs daily) Sunday: 3 tabs    Postsurgical hypothyroidism, Papillary carcinoma, follicular variant (H), Postsurgical hypoparathyroidism (H), Thyroid cancer (H)       * levothyroxine 112 MCG tablet    SYNTHROID/LEVOTHROID    30 tablet    Take 3 tablets (336 mcg) by mouth once a week    High grade B-cell lymphoma (H)       lidocaine 5 % ointment    XYLOCAINE    350 g    Apply quarter size amount to chest and back up to 3 times daily as needed for pain.    Chest wall pain       lidocaine visc 2% 2.5mL/5mL & maalox/mylanta w/ simeth 2.5mL/5mL & diphenhydrAMINE 5mg/5mL Susp suspension    [facility]    360 mL    Swish and spit 10 mLs in mouth every 6 hours as needed for mouth sores    Stomatitis and mucositis, High grade B-cell lymphoma (H)       lidocaine-prilocaine cream    EMLA    30 g    Apply topically as needed (port access pain.)    Neoplasm related pain, Chest wall pain       methocarbamol 750 MG tablet    ROBAXIN    360 tablet    Take 1 tablet (750 mg) by mouth 4 times daily as needed . Ok to take a 5th Robaxin on very severe days.    Myofascial pain       montelukast 10 MG tablet    SINGULAIR    60 tablet    Take 1 tablet (10 mg) by mouth 2 times daily    Idiopathic mast cell activation syndrome (H)       * MS CONTIN PO      Take 60 mg  by mouth daily Takes at 8pm        * morphine 30 MG 12 hr tablet    MS CONTIN    120 tablet    Take 1 tablet (30 mg) by mouth every 8 hours . Take an addition pill at night such that your evening dose is 60mg    Neoplasm related pain       ondansetron 8 MG ODT tab    ZOFRAN-ODT    30 tablet    Take 1 tablet (8 mg) by mouth every 8 hours as needed    High grade B-cell lymphoma (H), Nausea and vomiting, intractability of vomiting not specified, unspecified vomiting type       * order for DME     2 Units    Equipment being ordered: compression stockings. 20-30 mm or 30 - 40 mm as patient can tolerate. Physical therapy to determine if they should be above or below the knee.    Venous stasis       * order for DME     2 Device    Equipment being ordered: compression bra    Malignant neoplasm of right female breast, unspecified site of breast       * order for DME     1 Units    Compression stockings, medium grade, please measure and fit. Please dispense a pair.    Peripheral edema       oxyCODONE IR 30 MG tablet    ROXICODONE    180 tablet    Take 1 tablet (30 mg) by mouth every 4 hours as needed for moderate to severe pain    High grade B-cell lymphoma (H)       polyethylene glycol powder    MIRALAX    510 g    Take 17 g (1 capful) by mouth daily    Acute constipation       potassium chloride SA 20 MEQ CR tablet    KLOR-CON    90 tablet    Take 1 tablet (20 mEq) by mouth daily    Hypokalemia       PROBIOTIC DAILY PO      Take 1 capsule by mouth daily Lacto acid bifidobacterium    Breast cancer, unspecified laterality, Thyroid cancer (H), Chronic arthralgias of knees and hips, unspecified laterality       prochlorperazine 10 MG tablet    COMPAZINE     Take 10 mg by mouth as needed        ranitidine 75 MG tablet    ZANTAC    270 tablet    Take 1 tablet (75 mg) by mouth 3 times daily    Mast cell disease, systemic       senna-docusate 8.6-50 MG per tablet    SENOKOT-S;PERICOLACE    60 tablet    Take 1-2 tablets by mouth 2  times daily as needed for constipation    Acute constipation       SUMAtriptan 50 MG tablet    IMITREX     Take 50 mg by mouth as needed        triamcinolone 0.025 % ointment    KENALOG     Apply topically as needed        TUMS 500 MG chewable tablet   Generic drug:  calcium carbonate     180 chew tab    Take 2 tablets (1,000 mg) by mouth nightly as needed for other (cramps)        * UNABLE TO FIND      3 tablets 3 times daily MEDICATION NAME: calcium D-Glucarate  3 caps contain 180mg of elemental calcium.        * UNABLE TO FIND      2 tablets 3 times daily MEDICATION NAME: Natural D-Hist (on hold during chemo)    Breast cancer, unspecified laterality, Papillary carcinoma, follicular variant (H), Chronic musculoskeletal pain       * UNABLE TO FIND      MEDICATION NAME: Tumeric 3 capsules daily (on hold during chemo)        * UNABLE TO FIND      Take 2 capsules by mouth 3 times daily Muscle Mag. 2 caps contain B1 20mg, B2 20mg, B6 10mg, magesium 20mg, manganese 2mg.        * UNABLE TO FIND      2 tablets 3 times daily MEDICATION NAME: Digestzymes    Thyroid cancer (H), Postsurgical hypothyroidism, Postsurgical hypoparathyroidism (H)       * UNABLE TO FIND      1 tablet daily MEDICATION NAME: Pure Encapsulations    Thyroid cancer (H), Postsurgical hypothyroidism, Postsurgical hypoparathyroidism (H)       VENTOLIN  (90 BASE) MCG/ACT Inhaler   Generic drug:  albuterol     18 g    INHALE 2 PUFFS INTO THE LUNGS EVERY 4 HOURS AS NEEDED FOR SHORTNESS OF BREATH OR DIFFICULT BREATHING OR WHEEZING    Mild intermittent asthma without complication       vitamin D3 2000 UNITS Caps     60 capsule    Take 5,000 Units by mouth daily Takes 2 tabs daily    Thyroid cancer (H), Postsurgical hypothyroidism, Papillary carcinoma, follicular variant (H), Postsurgical hypoparathyroidism (H)       * Notice:  This list has 13 medication(s) that are the same as other medications prescribed for you. Read the directions carefully, and ask  your doctor or other care provider to review them with you.

## 2017-12-26 NOTE — NURSING NOTE
"Pt reporting Pain at Port-a-Cath site that she is rating an \"8\" currently.  Port site does not appear reddened or swollen.  This RN was able to easily access Port-a-Cath and brisk blood return was noted.  Pt states her Port has been painful before, but not this bad.  Port-a-Cath flushed with Saline and Heparin following blood draw and left accessed per Pt request.  Pt states she will return again tomorrow for labs and doesn't want to be poked again.      Chief Complaint   Patient presents with     Port Draw     Labs drawn by RN from Right Chest Port-a-Cath.  Line flushed with Saline and Heparin.      Elvira Ang RN   "

## 2017-12-26 NOTE — LETTER
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 She is currently undergoing treatment with DA-R-EPOCH. Her past medical history is significant for breast cancer in 2011 s/p bilateral mastectomies and BENJIE/BSO up until recently on Tamoxifen, papillary thyroid cancer s/p total thyroidectomy, chronic chest wall pain, and asthma. Briefly, her oncologic history is as follows:    She presented in 8/2017 with dramatically worse chest and back pain. CT 9/1/17 showed lytic lesion right 10th rib extending to right T9-10 neural foramen with vertebral body invasion. There was associated conglomerate of lymphadenopathy around the mid T-spine. She had a CT guided biopsy showing B-cell high grade lymphoma. Pathology showed \"The morphology shows a population of large cells, diffuse lymphoid infiltrate. The cells are atypical with abundant mitotic and apoptotic activity and single cell necrosis. Tumor is CD45 and CD79a positive and focally weakly CD30 positive. The other stains revealed positivity for CD20, BCL6, BCL2 and MUM1, and CD10 and CD21 negative. The CD5 and CD3 highlighted background T-cells.  MYC expression was equivocal with approximately 40% nuclei staining.  Ki-67 proliferative index is greater than 90-95%. The immunohistochemistry is suggestive of non-GCB immunophenotype of diffuse large B-cell lymphoma. The cytogenetics did not show rearrangement of the BCL2 or c-MYC. There is the presence of BCL6 rearrangement in 78% of cells and gains of MYC and BCL2, but no amplifications.\"     Staging LP and BMBx were negative for lymphoma.   She started DA-REPOCH 10/6/17. She did well with chemo other than rigors during CIVI.  Post cycle 1 complicated by mucositis and worsening of " chronic LE peripheral edema.   Cycle 2 on 10/27/17. Due to a rise in her LD and uric acid levels on that admission day, she underwent a CT scan which showed response to therapy with improvement in her mediastinal lymphadenopathy and right chest wall mass.  Cycle 3 on 11/16      Cycle 4 12/7    Interval History:  She reports that overall since hospital discharge she has been doing okay. Does complain of discomfort around her port and under her breast. Reports that she recovered from this cycle of chemo more quickly than before.       Current Outpatient Prescriptions   Medication Sig Dispense Refill     prochlorperazine (COMPAZINE) 10 MG tablet Take 10 mg by mouth as needed  3     SUMAtriptan (IMITREX) 50 MG tablet Take 50 mg by mouth as needed  3     cromolyn sodium (NASALCROM) 5.2 MG/ACT AERS Inhaler SPRAY ONE SPRAY( 1 ML) IN NOSTRIL DAILY 1 Bottle 6     acetaminophen (TYLENOL) 325 MG tablet Take 2 tablets (650 mg) by mouth every 4 hours as needed for mild pain or fever 100 tablet      ondansetron (ZOFRAN-ODT) 8 MG ODT tab Take 1 tablet (8 mg) by mouth every 8 hours as needed 30 tablet 0     fluconazole (DIFLUCAN) 200 MG tablet Take 1 tablet (200 mg) by mouth daily 30 tablet 0     cetirizine (ZYRTEC ALLERGY) 10 MG tablet Take 1 tablet (10 mg) by mouth 3 times daily On hold for lab test. 30 tablet 0     acyclovir (ZOVIRAX) 400 MG tablet Take 1 tablet (400 mg) by mouth 2 times daily 60 tablet 0     dexamethasone (DECADRON) 4 MG tablet Take 2 tablets (8 mg) by mouth daily for 2 days 4 tablet 0     diclofenac (VOLTAREN) 1 % GEL topical gel Apply affected area two times daily PRN using enclosed dosing card. 100 g 0     levofloxacin (LEVAQUIN) 250 MG tablet Take 1 tablet (250 mg) by mouth daily 30 tablet 0     allopurinol (ZYLOPRIM) 300 MG tablet Take 1 tablet (300 mg) by mouth daily 30 tablet 0     ferrous sulfate (IRON) 325 (65 FE) MG tablet Take 1 tablet (325 mg) by mouth 3 times daily (with meals) 90 tablet 0      cyclobenzaprine (FLEXERIL) 10 MG tablet Take 1 tablet (10 mg) by mouth 3 times daily as needed 30 tablet 0     levothyroxine (SYNTHROID/LEVOTHROID) 112 MCG tablet Take 3 tablets (336 mcg) by mouth once a week 30 tablet      [employer] FIBER GUMMY BEARS CHILDREN CHEW Take 5 g by mouth daily (2 gummy= 5 g =1 serving)       triamcinolone (KENALOG) 0.025 % ointment Apply topically as needed  3     morphine (MS CONTIN) 30 MG 12 hr tablet Take 1 tablet (30 mg) by mouth every 8 hours . Take an addition pill at night such that your evening dose is 60mg 120 tablet 0     oxyCODONE IR (ROXICODONE) 30 MG tablet Take 1 tablet (30 mg) by mouth every 4 hours as needed for moderate to severe pain 180 tablet 0     lidocaine (XYLOCAINE) 5 % ointment Apply quarter size amount to chest and back up to 3 times daily as needed for pain. 350 g 1     montelukast (SINGULAIR) 10 MG tablet Take 1 tablet (10 mg) by mouth 2 times daily (Patient taking differently: Take 20 mg by mouth 2 times daily ) 60 tablet 0     furosemide (LASIX) 20 MG tablet Take 1 tablet (20 mg) by mouth 2 times daily 60 tablet 0     magic mouthwash suspension (diphenhydrAMINE, lidocaine, aluminum-magnesium & simethicone) Swish and spit 10 mLs in mouth every 6 hours as needed for mouth sores 360 mL 1     bisacodyl (DULCOLAX) 5 MG EC tablet Take 3 tablets (15 mg) by mouth daily as needed for constipation 30 tablet 0     polyethylene glycol (MIRALAX) powder Take 17 g (1 capful) by mouth daily 510 g 0     senna-docusate (SENOKOT-S;PERICOLACE) 8.6-50 MG per tablet Take 1-2 tablets by mouth 2 times daily as needed for constipation 60 tablet 0     UNABLE TO FIND Take 2 capsules by mouth 3 times daily Muscle Mag. 2 caps contain B1 20mg, B2 20mg, B6 10mg, magesium 20mg, manganese 2mg.       Morphine Sulfate (MS CONTIN PO) Take 60 mg by mouth daily Takes at 8pm       lidocaine-prilocaine (EMLA) cream Apply topically as needed (port access pain.) 30 g 1     calcitRIOL (ROCALTROL) 0.25 MCG  capsule TAKE 2 CAPSULES BY MOUTH EVERY MORNING AND TAKE 1 CAPSULE BY MOUTH EVERY NIGHT AT BEDTIME (Patient taking differently: TAKE 2 CAPSULES BY MOUTH EVERY MORNING AND TAKE 1 CAPSULE BY MOUTH at noon) 270 capsule 3     celecoxib (CELEBREX) 200 MG capsule TAKE ONE CAPSULE BY MOUTH TWICE DAILY  56 capsule 0     order for DME Compression stockings, medium grade, please measure and fit. Please dispense a pair. (Patient not taking: Reported on 12/13/2017) 1 Units 0     methocarbamol (ROBAXIN) 750 MG tablet Take 1 tablet (750 mg) by mouth 4 times daily as needed . Ok to take a 5th Robaxin on very severe days. (Patient taking differently: Take 750 mg by mouth 4 times daily . Ok to take a 5th Robaxin on very severe days.) 360 tablet 1     COMPOUND CONTAINING CONTROLLED SUBSTANCE (CMPD RX) - PHARMACY TO MIX COMPOUNDED MEDICATION Apply small amount to affected area two times daily. (Patient not taking: Reported on 11/27/2017) 120 g 6     diazepam (VALIUM) 5 MG tablet Take 1 tablet (5 mg) by mouth 3 times daily as needed For muscle spasms (Patient taking differently: Take 5 mg by mouth 3 times daily For muscle spasms) 90 tablet 2     levothyroxine (SYNTHROID/LEVOTHROID) 112 MCG tablet Mon-Sat: (2 tabs daily) Sunday: 3 tabs (Patient taking differently: 112 mcg Mon-Sat: (2 tabs daily) Sunday: 3 tabs) 189 tablet 3     hydrochlorothiazide (MICROZIDE) 12.5 MG capsule Take 1 capsule (12.5 mg) by mouth daily 90 capsule 2     EPINEPHrine (EPIPEN 2-CARIDAD) 0.3 MG/0.3ML injection Inject 0.3 mLs (0.3 mg) into the muscle once as needed for anaphylaxis (Patient not taking: Reported on 12/7/2017) 0.6 mL 3     potassium chloride SA (POTASSIUM CHLORIDE) 20 MEQ CR tablet Take 1 tablet (20 mEq) by mouth daily 90 tablet 3     VENTOLIN  (90 BASE) MCG/ACT Inhaler INHALE 2 PUFFS INTO THE LUNGS EVERY 4 HOURS AS NEEDED FOR SHORTNESS OF BREATH OR DIFFICULT BREATHING OR WHEEZING (Patient not taking: Reported on 12/7/2017) 18 g 3     cromolyn  (OPTICROM) 4 % ophthalmic solution Place 1 drop into both eyes 4 times daily 10 mL 5     Cholecalciferol (VITAMIN D3) 2000 UNITS CAPS Take 5,000 Units by mouth daily Takes 2 tabs daily 60 capsule 4     order for DME Equipment being ordered: compression stockings. 20-30 mm or 30 - 40 mm as patient can tolerate. Physical therapy to determine if they should be above or below the knee. (Patient not taking: Reported on 12/13/2017) 2 Units 4     order for DME Equipment being ordered: compression bra (Patient not taking: Reported on 12/7/2017) 2 Device 1     ranitidine (ZANTAC) 75 MG tablet Take 1 tablet (75 mg) by mouth 3 times daily 270 tablet 3     aluminum chloride (DRYSOL) 20 % external solution Apply topically At Bedtime 60 mL 3     UNABLE TO FIND MEDICATION NAME: Tumeric 3 capsules daily (on hold during chemo)       UNABLE TO FIND 2 tablets 3 times daily MEDICATION NAME: Natural D-Hist (on hold during chemo)       calcium citrate-vitamin D (CALCIUM CITRATE +D) 315-250 MG-UNIT TABS Take 2 tablets by mouth twice a week  120 tablet      calcium carbonate (TUMS) 500 MG chewable tablet Take 2 tablets (1,000 mg) by mouth nightly as needed for other (cramps) 180 chew tab 3     UNABLE TO FIND 3 tablets 3 times daily MEDICATION NAME: calcium D-Glucarate   3 caps contain 180mg of elemental calcium.       Triamcinolone Acetonide (AZMACORT IN) Inhale 2 puffs into the lungs as needed       UNABLE TO FIND 2 tablets 3 times daily MEDICATION NAME: Digestzymes        UNABLE TO FIND 1 tablet daily MEDICATION NAME: Pure Encapsulations       Probiotic Product (PROBIOTIC DAILY PO) Take 1 capsule by mouth daily Lacto acid bifidobacterium       Physical Examination:  /80  Pulse 114  Temp 98.2  F (36.8  C) (Oral)  Resp 16  Wt 85.5 kg (188 lb 7.9 oz)  SpO2 99%  BMI 32.86 kg/m2  Wt Readings from Last 10 Encounters:   12/26/17 85.5 kg (188 lb 7.9 oz)   12/19/17 83.8 kg (184 lb 11.2 oz)   12/15/17 84.9 kg (187 lb 3.2 oz)   12/13/17  88.2 kg (194 lb 6.4 oz)   12/11/17 88.4 kg (194 lb 14.4 oz)   12/07/17 86.3 kg (190 lb 4.8 oz)   11/28/17 84.3 kg (185 lb 12.8 oz)   11/28/17 84.3 kg (185 lb 12.8 oz)   11/27/17 83 kg (182 lb 14.4 oz)   11/22/17 83.8 kg (184 lb 11.2 oz)   Constitutional: NAD  Eyes:  PERRL. No scleral icterus.  ENT: Oral mucosa is moist without lesions or thrush.   Cardiovascular: Regular rate and rhythm. Port accessed without surrounding erythema or drainage  Respiratory: Clear to auscultation bilaterally. No wheezes or crackles.  Gastrointestinal: Bowel sounds +. Soft, nontender  Neurologic: normal speech, up to exam table unaided  Skin:No rashes noted.   Extremities: trace lower extremity edema bilaterally, compression stockings in place.    Laboratory Data:  Results for orders placed or performed in visit on 12/26/17 (from the past 24 hour(s))   CBC with platelets differential   Result Value Ref Range    WBC 10.6 4.0 - 11.0 10e9/L    RBC Count 3.26 (L) 3.8 - 5.2 10e12/L    Hemoglobin 10.4 (L) 11.7 - 15.7 g/dL    Hematocrit 32.7 (L) 35.0 - 47.0 %     78 - 100 fl    MCH 31.9 26.5 - 33.0 pg    MCHC 31.8 31.5 - 36.5 g/dL    RDW 22.8 (H) 10.0 - 15.0 %    Platelet Count 249 150 - 450 10e9/L    Diff Method Automated Method     % Neutrophils 84.7 %    % Lymphocytes 4.3 %    % Monocytes 8.8 %    % Eosinophils 0.4 %    % Basophils 0.4 %    % Immature Granulocytes 1.4 %    Nucleated RBCs 0 0 /100    Absolute Neutrophil 9.0 (H) 1.6 - 8.3 10e9/L    Absolute Lymphocytes 0.5 (L) 0.8 - 5.3 10e9/L    Absolute Monocytes 0.9 0.0 - 1.3 10e9/L    Absolute Eosinophils 0.0 0.0 - 0.7 10e9/L    Absolute Basophils 0.0 0.0 - 0.2 10e9/L    Abs Immature Granulocytes 0.2 0 - 0.4 10e9/L    Absolute Nucleated RBC 0.0    Comprehensive metabolic panel   Result Value Ref Range    Sodium 142 133 - 144 mmol/L    Potassium 3.8 3.4 - 5.3 mmol/L    Chloride 103 94 - 109 mmol/L    Carbon Dioxide 30 20 - 32 mmol/L    Anion Gap 9 3 - 14 mmol/L    Glucose 138 (H) 70  "- 99 mg/dL    Urea Nitrogen 17 7 - 30 mg/dL    Creatinine 1.08 (H) 0.52 - 1.04 mg/dL    GFR Estimate 52 (L) >60 mL/min/1.7m2    GFR Estimate If Black 63 >60 mL/min/1.7m2    Calcium 8.2 (L) 8.5 - 10.1 mg/dL    Bilirubin Total 0.4 0.2 - 1.3 mg/dL    Albumin 3.2 (L) 3.4 - 5.0 g/dL    Protein Total 6.5 (L) 6.8 - 8.8 g/dL    Alkaline Phosphatase 87 40 - 150 U/L    ALT 28 0 - 50 U/L    AST 25 0 - 45 U/L   Troponin I   Result Value Ref Range    Troponin I ES <0.015 0.000 - 0.045 ug/L     *Note: Due to a large number of results and/or encounters for the requested time period, some results have not been displayed. A complete set of results can be found in Results Review.   PET scan 12/21  IMPRESSION:   1. In this patient with a history of diffuse large B-cell lymphoma,  breast cancer and papillary thyroid carcinoma there is evidence for  partial or complete response;      a. Markedly decreased size of right paravertebral soft tissue mass  involving the right 10th rib which is no longer metabolically active.      b. Decreased size of left mediastinal lymph node which is no  longer metabolically active.      c. No new lesions concerning for recurrence or metastasis.      d. Diffuse bone marrow FDG uptake likely related to bone marrow  reactivation.  2. Stable intra and extrahepatic biliary dilatation. There was no  laboratory correlation for obstruction on 12/19/2017. This most likely  represents reservoir effect status post cholecystectomy. There is a  surgical clip or stone at the cystic duct stump.  3. Moderate stool burden with fecalization of the contents within the  distal small bowel.  4. Nonobstructing 2 mm right renal stone.      Assessment and Plan:    DLBCL, \"double-hit\"-like, with c-myc overexpression but not re-arrangement. currently on treatment with DA-R-EPOCH  S/p 4 cycles   Complication include mild mucositis and mild infusion reaction (rigors), nausea and fatigue. ONly needed PRBC once. No infecions.   "   Interval CT CAP after 1 cycle due to elevated LD and uric acid showed response to treatment.  PET scan with CR with no PET avidity on 12/21  Recommended that she take zofran TID to prevent nausea    Will need hospital admissions for DA-R-EPOCH on 12/28, and 1/18    Heme  No transfusion needs today. Continue to monitor with twice weekly labs and transfuse if Hgb<8 and Plt<10k.      ID  Continue ppx ACV 400mg BID, levaquin 250mg daily, and fluconazole 200mg daily.     History of stage 1, ER/CO+, HER2- breast cancer s/p bilateral mastectomies and BENJIE/BSO  Follows with Dr. Crawley. Oncotype recurrence score 11%. Was initially on Arimidex ([id number]) but changed to Tamoxifen due to arthralgias. Tamoxifen held since 10/5 and continue to hold with chemotherapy.     History of papillary thyroid cancer, s/p total thyroidectomy  Follows with Dr. Castro. Has post surgical hypothyroidism. Continue levothyroxine and calcitriol as prescribed. Neck u/s on 11/2 shows no evidence of disease.     History of Rachael en Y gastric bypass  Continue supplements (calcium citrate-vitamin D, vitamin D3, magnesium citrate). Also has iron deficiency anemia likely from poor absorption and per Dr. Sauceda, she should decrease Fe supplementation to  325mg PO ferrous sulfate QD. She should take stool softeners if issues with iron induced constipation.     Chronic chest wall pain s/p mastectomies  Follows with Dr. Benitez. Palliative care to continue to prescribe pain medications. Taking MS Contin, Oxycodone for breakthrough pain, and celebrex. Continued to encourage pt to wean down on pain meds as tolerated.   Continues to have some pain around port but no evidence of infection or dysfunction.     Gerri Kessler MD   Seen with Dr. Sauceda      Today, I  saw and examined the patient independently in clinic and discussed the recommendations. I reviewed the case with the fellow and agree with the note as above.     Garima Sauceda,  MD           Garima Sauceda MD

## 2017-12-27 ENCOUNTER — DOCUMENTATION ONLY (OUTPATIENT)
Dept: ONCOLOGY | Facility: CLINIC | Age: 57
End: 2017-12-27

## 2017-12-27 DIAGNOSIS — C85.10 HIGH GRADE B-CELL LYMPHOMA (H): ICD-10-CM

## 2017-12-27 LAB
ALBUMIN SERPL-MCNC: 3.5 G/DL (ref 3.4–5)
ALP SERPL-CCNC: 93 U/L (ref 40–150)
ALT SERPL W P-5'-P-CCNC: 31 U/L (ref 0–50)
ANION GAP SERPL CALCULATED.3IONS-SCNC: 6 MMOL/L (ref 3–14)
AST SERPL W P-5'-P-CCNC: 26 U/L (ref 0–45)
BASOPHILS # BLD AUTO: 0.1 10E9/L (ref 0–0.2)
BASOPHILS NFR BLD AUTO: 0.7 %
BILIRUB SERPL-MCNC: 0.3 MG/DL (ref 0.2–1.3)
BUN SERPL-MCNC: 17 MG/DL (ref 7–30)
CALCIUM SERPL-MCNC: 8.6 MG/DL (ref 8.5–10.1)
CHLORIDE SERPL-SCNC: 100 MMOL/L (ref 94–109)
CO2 SERPL-SCNC: 32 MMOL/L (ref 20–32)
CREAT SERPL-MCNC: 0.98 MG/DL (ref 0.52–1.04)
DIFFERENTIAL METHOD BLD: ABNORMAL
EOSINOPHIL # BLD AUTO: 0.1 10E9/L (ref 0–0.7)
EOSINOPHIL NFR BLD AUTO: 0.6 %
ERYTHROCYTE [DISTWIDTH] IN BLOOD BY AUTOMATED COUNT: 22.5 % (ref 10–15)
GFR SERPL CREATININE-BSD FRML MDRD: 59 ML/MIN/1.7M2
GLUCOSE SERPL-MCNC: 108 MG/DL (ref 70–99)
HCT VFR BLD AUTO: 34.7 % (ref 35–47)
HGB BLD-MCNC: 11 G/DL (ref 11.7–15.7)
IMM GRANULOCYTES # BLD: 0.1 10E9/L (ref 0–0.4)
IMM GRANULOCYTES NFR BLD: 1.2 %
LYMPHOCYTES # BLD AUTO: 0.7 10E9/L (ref 0.8–5.3)
LYMPHOCYTES NFR BLD AUTO: 7.5 %
MCH RBC QN AUTO: 31.9 PG (ref 26.5–33)
MCHC RBC AUTO-ENTMCNC: 31.7 G/DL (ref 31.5–36.5)
MCV RBC AUTO: 101 FL (ref 78–100)
MONOCYTES # BLD AUTO: 0.8 10E9/L (ref 0–1.3)
MONOCYTES NFR BLD AUTO: 9.6 %
NEUTROPHILS # BLD AUTO: 7 10E9/L (ref 1.6–8.3)
NEUTROPHILS NFR BLD AUTO: 80.4 %
NRBC # BLD AUTO: 0 10*3/UL
NRBC BLD AUTO-RTO: 0 /100
PLATELET # BLD AUTO: 305 10E9/L (ref 150–450)
POTASSIUM SERPL-SCNC: 3.6 MMOL/L (ref 3.4–5.3)
PROT SERPL-MCNC: 6.9 G/DL (ref 6.8–8.8)
RBC # BLD AUTO: 3.45 10E12/L (ref 3.8–5.2)
SODIUM SERPL-SCNC: 139 MMOL/L (ref 133–144)
WBC # BLD AUTO: 8.7 10E9/L (ref 4–11)

## 2017-12-27 PROCEDURE — 80053 COMPREHEN METABOLIC PANEL: CPT | Performed by: PHYSICIAN ASSISTANT

## 2017-12-27 PROCEDURE — 85025 COMPLETE CBC W/AUTO DIFF WBC: CPT | Performed by: PHYSICIAN ASSISTANT

## 2017-12-27 PROCEDURE — 25000128 H RX IP 250 OP 636

## 2017-12-27 RX ORDER — HEPARIN SODIUM (PORCINE) LOCK FLUSH IV SOLN 100 UNIT/ML 100 UNIT/ML
5 SOLUTION INTRAVENOUS EVERY 8 HOURS PRN
Status: DISCONTINUED | OUTPATIENT
Start: 2017-12-27 | End: 2018-01-04 | Stop reason: HOSPADM

## 2017-12-27 RX ADMIN — SODIUM CHLORIDE, PRESERVATIVE FREE 5 ML: 5 INJECTION INTRAVENOUS at 16:32

## 2017-12-27 NOTE — PROGRESS NOTES
Received call from Rahel zapata RN at [facility], requesting pt come in for admission 12/28 at either 8 am or 9 am. She is currently scheduled for 7 am. Left message for pt to call back triage ([phone]) and confirm if she can come later. Left message for Rahel that pt was called but unable to confirm yet whether she can change times.

## 2017-12-27 NOTE — NURSING NOTE
Chief Complaint   Patient presents with     Port Draw     Labs drawn via port which was previously accessed. Line flushed and hep locked, then de-accessed per patient request.     Ila Navarro RN

## 2017-12-28 ENCOUNTER — APPOINTMENT (OUTPATIENT)
Dept: GENERAL RADIOLOGY | Facility: CLINIC | Age: 57
End: 2017-12-28
Attending: NURSE PRACTITIONER
Payer: COMMERCIAL

## 2017-12-28 ENCOUNTER — HOSPITAL ENCOUNTER (INPATIENT)
Facility: CLINIC | Age: 57
LOS: 4 days | Discharge: HOME | End: 2018-01-01
Admitting: INTERNAL MEDICINE
Payer: COMMERCIAL

## 2017-12-28 DIAGNOSIS — G89.3 NEOPLASM RELATED PAIN: ICD-10-CM

## 2017-12-28 DIAGNOSIS — R07.89 CHEST WALL PAIN: ICD-10-CM

## 2017-12-28 DIAGNOSIS — C85.10 HIGH GRADE B-CELL LYMPHOMA (H): ICD-10-CM

## 2017-12-28 LAB
ALBUMIN SERPL-MCNC: 3 G/DL (ref 3.4–5)
ALP SERPL-CCNC: 75 U/L (ref 40–150)
ALT SERPL W P-5'-P-CCNC: 29 U/L (ref 0–50)
ANION GAP SERPL CALCULATED.3IONS-SCNC: 5 MMOL/L (ref 3–14)
APPEARANCE CSF: CLEAR
AST SERPL W P-5'-P-CCNC: 18 U/L (ref 0–45)
BASOPHILS # BLD AUTO: 0 10E9/L (ref 0–0.2)
BASOPHILS NFR BLD AUTO: 0.2 %
BILIRUB SERPL-MCNC: 0.3 MG/DL (ref 0.2–1.3)
BUN SERPL-MCNC: 18 MG/DL (ref 7–30)
CALCIUM SERPL-MCNC: 8.5 MG/DL (ref 8.5–10.1)
CHLORIDE SERPL-SCNC: 103 MMOL/L (ref 94–109)
CO2 SERPL-SCNC: 33 MMOL/L (ref 20–32)
COLOR CSF: COLORLESS
CREAT SERPL-MCNC: 0.96 MG/DL (ref 0.52–1.04)
DIFFERENTIAL METHOD BLD: ABNORMAL
EOSINOPHIL # BLD AUTO: 0 10E9/L (ref 0–0.7)
EOSINOPHIL NFR BLD AUTO: 0.1 %
ERYTHROCYTE [DISTWIDTH] IN BLOOD BY AUTOMATED COUNT: 22.2 % (ref 10–15)
GFR SERPL CREATININE-BSD FRML MDRD: 59 ML/MIN/1.7M2
GLUCOSE CSF-MCNC: 61 MG/DL (ref 40–70)
GLUCOSE SERPL-MCNC: 119 MG/DL (ref 70–99)
GRAM STN SPEC: NORMAL
GRAM STN SPEC: NORMAL
HCT VFR BLD AUTO: 31.8 % (ref 35–47)
HGB BLD-MCNC: 9.9 G/DL (ref 11.7–15.7)
IMM GRANULOCYTES # BLD: 0 10E9/L (ref 0–0.4)
IMM GRANULOCYTES NFR BLD: 0.4 %
INR PPP: 0.99 (ref 0.86–1.14)
LYMPHOCYTES # BLD AUTO: 0.3 10E9/L (ref 0.8–5.3)
LYMPHOCYTES NFR BLD AUTO: 3.6 %
MAGNESIUM SERPL-MCNC: 1.8 MG/DL (ref 1.6–2.3)
MCH RBC QN AUTO: 31.1 PG (ref 26.5–33)
MCHC RBC AUTO-ENTMCNC: 31.1 G/DL (ref 31.5–36.5)
MCV RBC AUTO: 100 FL (ref 78–100)
MONOCYTES # BLD AUTO: 0.1 10E9/L (ref 0–1.3)
MONOCYTES NFR BLD AUTO: 1.1 %
NEUTROPHILS # BLD AUTO: 8.6 10E9/L (ref 1.6–8.3)
NEUTROPHILS NFR BLD AUTO: 94.6 %
NRBC # BLD AUTO: 0 10*3/UL
NRBC BLD AUTO-RTO: 0 /100
PHOSPHATE SERPL-MCNC: 4.8 MG/DL (ref 2.5–4.5)
PLATELET # BLD AUTO: 279 10E9/L (ref 150–450)
POTASSIUM SERPL-SCNC: 3.7 MMOL/L (ref 3.4–5.3)
PROT CSF-MCNC: 55 MG/DL (ref 15–60)
PROT SERPL-MCNC: 6 G/DL (ref 6.8–8.8)
RBC # BLD AUTO: 3.18 10E12/L (ref 3.8–5.2)
RBC # CSF MANUAL: 198 /UL (ref 0–2)
SODIUM SERPL-SCNC: 141 MMOL/L (ref 133–144)
SPECIMEN SOURCE: NORMAL
TUBE # CSF: 4 #
URATE SERPL-MCNC: 6.6 MG/DL (ref 2.6–6)
WBC # BLD AUTO: 9.1 10E9/L (ref 4–11)
WBC # CSF MANUAL: 1 /UL (ref 0–5)

## 2017-12-28 PROCEDURE — 25000125 ZZHC RX 250: Performed by: RADIOLOGY

## 2017-12-28 PROCEDURE — 3E0R305 INTRODUCTION OF OTHER ANTINEOPLASTIC INTO SPINAL CANAL, PERCUTANEOUS APPROACH: ICD-10-PCS | Performed by: INTERNAL MEDICINE

## 2017-12-28 PROCEDURE — 84550 ASSAY OF BLOOD/URIC ACID: CPT

## 2017-12-28 PROCEDURE — 84100 ASSAY OF PHOSPHORUS: CPT

## 2017-12-28 PROCEDURE — 40001004 ZZHCL STATISTIC FLOW INT 9-15 ABY TC 88188: Performed by: NURSE PRACTITIONER

## 2017-12-28 PROCEDURE — 25000125 ZZHC RX 250: Performed by: NURSE PRACTITIONER

## 2017-12-28 PROCEDURE — 25000131 ZZH RX MED GY IP 250 OP 636 PS 637

## 2017-12-28 PROCEDURE — 99222 1ST HOSP IP/OBS MODERATE 55: CPT | Performed by: INTERNAL MEDICINE

## 2017-12-28 PROCEDURE — 87070 CULTURE OTHR SPECIMN AEROBIC: CPT | Performed by: NURSE PRACTITIONER

## 2017-12-28 PROCEDURE — 25000128 H RX IP 250 OP 636

## 2017-12-28 PROCEDURE — 80053 COMPREHEN METABOLIC PANEL: CPT

## 2017-12-28 PROCEDURE — 25000125 ZZHC RX 250

## 2017-12-28 PROCEDURE — 96450 CHEMOTHERAPY INTO CNS: CPT

## 2017-12-28 PROCEDURE — 36592 COLLECT BLOOD FROM PICC: CPT | Performed by: NURSE PRACTITIONER

## 2017-12-28 PROCEDURE — 89050 BODY FLUID CELL COUNT: CPT | Performed by: NURSE PRACTITIONER

## 2017-12-28 PROCEDURE — 88185 FLOWCYTOMETRY/TC ADD-ON: CPT | Performed by: NURSE PRACTITIONER

## 2017-12-28 PROCEDURE — 25000132 ZZH RX MED GY IP 250 OP 250 PS 637

## 2017-12-28 PROCEDURE — 87205 SMEAR GRAM STAIN: CPT | Performed by: NURSE PRACTITIONER

## 2017-12-28 PROCEDURE — 83735 ASSAY OF MAGNESIUM: CPT

## 2017-12-28 PROCEDURE — 85025 COMPLETE CBC W/AUTO DIFF WBC: CPT

## 2017-12-28 PROCEDURE — 009U3ZX DRAINAGE OF SPINAL CANAL, PERCUTANEOUS APPROACH, DIAGNOSTIC: ICD-10-PCS | Performed by: INTERNAL MEDICINE

## 2017-12-28 PROCEDURE — 85610 PROTHROMBIN TIME: CPT | Performed by: NURSE PRACTITIONER

## 2017-12-28 PROCEDURE — 82945 GLUCOSE OTHER FLUID: CPT | Performed by: NURSE PRACTITIONER

## 2017-12-28 PROCEDURE — 84157 ASSAY OF PROTEIN OTHER: CPT | Performed by: NURSE PRACTITIONER

## 2017-12-28 PROCEDURE — 12000008 ZZH R&B INTERMEDIATE UMMC

## 2017-12-28 PROCEDURE — 88184 FLOWCYTOMETRY/ TC 1 MARKER: CPT | Performed by: NURSE PRACTITIONER

## 2017-12-28 PROCEDURE — 25000132 ZZH RX MED GY IP 250 OP 250 PS 637: Performed by: NURSE PRACTITIONER

## 2017-12-28 RX ORDER — CALCITRIOL 0.5 UG/1
0.5 CAPSULE, LIQUID FILLED ORAL DAILY
Status: DISCONTINUED | OUTPATIENT
Start: 2017-12-28 | End: 2017-12-31

## 2017-12-28 RX ORDER — HEPARIN SODIUM,PORCINE 10 UNIT/ML
5-10 VIAL (ML) INTRAVENOUS EVERY 24 HOURS
Status: DISCONTINUED | OUTPATIENT
Start: 2017-12-28 | End: 2018-01-01 | Stop reason: HOSPADM

## 2017-12-28 RX ORDER — MORPHINE SULFATE 30 MG/1
30 TABLET, FILM COATED, EXTENDED RELEASE ORAL EVERY 8 HOURS
Status: DISCONTINUED | OUTPATIENT
Start: 2017-12-28 | End: 2017-12-28

## 2017-12-28 RX ORDER — DIAZEPAM 5 MG
5 TABLET ORAL EVERY 8 HOURS
Status: DISCONTINUED | OUTPATIENT
Start: 2017-12-28 | End: 2018-01-01 | Stop reason: HOSPADM

## 2017-12-28 RX ORDER — HEPARIN SODIUM (PORCINE) LOCK FLUSH IV SOLN 100 UNIT/ML 100 UNIT/ML
5 SOLUTION INTRAVENOUS
Status: DISCONTINUED | OUTPATIENT
Start: 2017-12-28 | End: 2018-01-01 | Stop reason: HOSPADM

## 2017-12-28 RX ORDER — DIAZEPAM 5 MG
5 TABLET ORAL 3 TIMES DAILY PRN
Status: DISCONTINUED | OUTPATIENT
Start: 2017-12-28 | End: 2017-12-28

## 2017-12-28 RX ORDER — AMOXICILLIN 250 MG
1-2 CAPSULE ORAL 2 TIMES DAILY PRN
Status: DISCONTINUED | OUTPATIENT
Start: 2017-12-28 | End: 2018-01-01 | Stop reason: HOSPADM

## 2017-12-28 RX ORDER — CETIRIZINE HYDROCHLORIDE 10 MG/1
10 TABLET ORAL 3 TIMES DAILY
Status: DISCONTINUED | OUTPATIENT
Start: 2017-12-28 | End: 2018-01-01 | Stop reason: HOSPADM

## 2017-12-28 RX ORDER — LORAZEPAM 0.5 MG/1
.5-1 TABLET ORAL EVERY 6 HOURS PRN
Status: DISCONTINUED | OUTPATIENT
Start: 2017-12-28 | End: 2018-01-01 | Stop reason: HOSPADM

## 2017-12-28 RX ORDER — HEPARIN SODIUM,PORCINE 10 UNIT/ML
5-10 VIAL (ML) INTRAVENOUS
Status: DISCONTINUED | OUTPATIENT
Start: 2017-12-28 | End: 2018-01-01 | Stop reason: HOSPADM

## 2017-12-28 RX ORDER — DIPHENHYDRAMINE HCL 50 MG
50 CAPSULE ORAL ONCE
Status: COMPLETED | OUTPATIENT
Start: 2017-12-28 | End: 2017-12-28

## 2017-12-28 RX ORDER — CALCITRIOL 0.5 UG/1
0.5 CAPSULE, LIQUID FILLED ORAL EVERY MORNING
Status: DISCONTINUED | OUTPATIENT
Start: 2017-12-29 | End: 2018-01-01 | Stop reason: HOSPADM

## 2017-12-28 RX ORDER — PROCHLORPERAZINE MALEATE 10 MG
10 TABLET ORAL EVERY 6 HOURS PRN
Status: DISCONTINUED | OUTPATIENT
Start: 2017-12-28 | End: 2018-01-01 | Stop reason: HOSPADM

## 2017-12-28 RX ORDER — EPINEPHRINE 1 MG/ML
0.3 INJECTION, SOLUTION, CONCENTRATE INTRAVENOUS EVERY 5 MIN PRN
Status: DISCONTINUED | OUTPATIENT
Start: 2017-12-28 | End: 2018-01-01 | Stop reason: HOSPADM

## 2017-12-28 RX ORDER — LIDOCAINE 50 MG/G
OINTMENT TOPICAL 3 TIMES DAILY PRN
Status: DISCONTINUED | OUTPATIENT
Start: 2017-12-28 | End: 2018-01-01 | Stop reason: HOSPADM

## 2017-12-28 RX ORDER — POLYETHYLENE GLYCOL 3350 17 G/17G
17 POWDER, FOR SOLUTION ORAL DAILY
Status: DISCONTINUED | OUTPATIENT
Start: 2017-12-28 | End: 2018-01-01 | Stop reason: HOSPADM

## 2017-12-28 RX ORDER — ALLOPURINOL 300 MG/1
300 TABLET ORAL DAILY
Status: DISCONTINUED | OUTPATIENT
Start: 2017-12-28 | End: 2018-01-01 | Stop reason: HOSPADM

## 2017-12-28 RX ORDER — METHOCARBAMOL 750 MG/1
750 TABLET, FILM COATED ORAL 4 TIMES DAILY
Status: DISCONTINUED | OUTPATIENT
Start: 2017-12-28 | End: 2018-01-01 | Stop reason: HOSPADM

## 2017-12-28 RX ORDER — OXYCODONE HYDROCHLORIDE 30 MG/1
30 TABLET ORAL EVERY 4 HOURS PRN
Status: DISCONTINUED | OUTPATIENT
Start: 2017-12-28 | End: 2018-01-01 | Stop reason: HOSPADM

## 2017-12-28 RX ORDER — LIDOCAINE/PRILOCAINE 2.5 %-2.5%
CREAM (GRAM) TOPICAL
Status: DISCONTINUED | OUTPATIENT
Start: 2017-12-28 | End: 2018-01-01 | Stop reason: HOSPADM

## 2017-12-28 RX ORDER — ALBUTEROL SULFATE 0.83 MG/ML
2.5 SOLUTION RESPIRATORY (INHALATION)
Status: DISCONTINUED | OUTPATIENT
Start: 2017-12-28 | End: 2018-01-01 | Stop reason: HOSPADM

## 2017-12-28 RX ORDER — ACYCLOVIR 400 MG/1
400 TABLET ORAL 2 TIMES DAILY
Status: DISCONTINUED | OUTPATIENT
Start: 2017-12-28 | End: 2018-01-01 | Stop reason: HOSPADM

## 2017-12-28 RX ORDER — DIPHENHYDRAMINE HYDROCHLORIDE 50 MG/ML
50 INJECTION INTRAMUSCULAR; INTRAVENOUS
Status: DISCONTINUED | OUTPATIENT
Start: 2017-12-28 | End: 2018-01-01 | Stop reason: HOSPADM

## 2017-12-28 RX ORDER — NALOXONE HYDROCHLORIDE 0.4 MG/ML
.1-.4 INJECTION, SOLUTION INTRAMUSCULAR; INTRAVENOUS; SUBCUTANEOUS
Status: DISCONTINUED | OUTPATIENT
Start: 2017-12-28 | End: 2018-01-01 | Stop reason: HOSPADM

## 2017-12-28 RX ORDER — FLUCONAZOLE 200 MG/1
200 TABLET ORAL DAILY
Status: DISCONTINUED | OUTPATIENT
Start: 2017-12-28 | End: 2018-01-01 | Stop reason: HOSPADM

## 2017-12-28 RX ORDER — LEVOTHYROXINE SODIUM 112 UG/1
112 TABLET ORAL
Status: DISCONTINUED | OUTPATIENT
Start: 2017-12-28 | End: 2017-12-28

## 2017-12-28 RX ORDER — MONTELUKAST SODIUM 10 MG/1
20 TABLET ORAL 2 TIMES DAILY
Status: DISCONTINUED | OUTPATIENT
Start: 2017-12-28 | End: 2018-01-01 | Stop reason: HOSPADM

## 2017-12-28 RX ORDER — SODIUM CHLORIDE 9 MG/ML
1000 INJECTION, SOLUTION INTRAVENOUS CONTINUOUS PRN
Status: DISCONTINUED | OUTPATIENT
Start: 2017-12-28 | End: 2018-01-01 | Stop reason: HOSPADM

## 2017-12-28 RX ORDER — ALLOPURINOL 300 MG/1
300 TABLET ORAL DAILY
Status: DISCONTINUED | OUTPATIENT
Start: 2017-12-28 | End: 2017-12-28

## 2017-12-28 RX ORDER — DEXAMETHASONE 4 MG/1
8 TABLET ORAL DAILY
Status: DISCONTINUED | OUTPATIENT
Start: 2018-01-02 | End: 2018-01-01 | Stop reason: HOSPADM

## 2017-12-28 RX ORDER — MORPHINE SULFATE 60 MG/1
60 TABLET, FILM COATED, EXTENDED RELEASE ORAL AT BEDTIME
Status: DISCONTINUED | OUTPATIENT
Start: 2017-12-28 | End: 2018-01-01 | Stop reason: HOSPADM

## 2017-12-28 RX ORDER — AMOXICILLIN 250 MG
2 CAPSULE ORAL 2 TIMES DAILY
Status: DISCONTINUED | OUTPATIENT
Start: 2017-12-28 | End: 2018-01-01 | Stop reason: HOSPADM

## 2017-12-28 RX ORDER — LIDOCAINE HYDROCHLORIDE 10 MG/ML
10 INJECTION, SOLUTION EPIDURAL; INFILTRATION; INTRACAUDAL; PERINEURAL ONCE
Status: COMPLETED | OUTPATIENT
Start: 2017-12-28 | End: 2017-12-28

## 2017-12-28 RX ORDER — ONDANSETRON 8 MG/1
8 TABLET, FILM COATED ORAL EVERY 8 HOURS
Status: DISCONTINUED | OUTPATIENT
Start: 2017-12-28 | End: 2018-01-01 | Stop reason: HOSPADM

## 2017-12-28 RX ORDER — CROMOLYN SODIUM 40 MG/ML
1 SOLUTION/ DROPS OPHTHALMIC 4 TIMES DAILY
Status: DISCONTINUED | OUTPATIENT
Start: 2017-12-28 | End: 2018-01-01 | Stop reason: HOSPADM

## 2017-12-28 RX ORDER — ACETAMINOPHEN 325 MG/1
650 TABLET ORAL ONCE
Status: COMPLETED | OUTPATIENT
Start: 2017-12-28 | End: 2017-12-28

## 2017-12-28 RX ORDER — LORAZEPAM 2 MG/ML
.5-1 INJECTION INTRAMUSCULAR EVERY 6 HOURS PRN
Status: DISCONTINUED | OUTPATIENT
Start: 2017-12-28 | End: 2018-01-01 | Stop reason: HOSPADM

## 2017-12-28 RX ORDER — CYCLOBENZAPRINE HCL 10 MG
10 TABLET ORAL 3 TIMES DAILY PRN
Status: DISCONTINUED | OUTPATIENT
Start: 2017-12-28 | End: 2018-01-01 | Stop reason: HOSPADM

## 2017-12-28 RX ORDER — ALBUTEROL SULFATE 90 UG/1
1-2 AEROSOL, METERED RESPIRATORY (INHALATION)
Status: DISCONTINUED | OUTPATIENT
Start: 2017-12-28 | End: 2018-01-01 | Stop reason: HOSPADM

## 2017-12-28 RX ORDER — LEVOTHYROXINE SODIUM 112 UG/1
224 TABLET ORAL
Status: DISCONTINUED | OUTPATIENT
Start: 2017-12-29 | End: 2018-01-01 | Stop reason: HOSPADM

## 2017-12-28 RX ORDER — METHYLPREDNISOLONE SODIUM SUCCINATE 125 MG/2ML
125 INJECTION, POWDER, LYOPHILIZED, FOR SOLUTION INTRAMUSCULAR; INTRAVENOUS
Status: DISCONTINUED | OUTPATIENT
Start: 2017-12-28 | End: 2018-01-01 | Stop reason: HOSPADM

## 2017-12-28 RX ORDER — MORPHINE SULFATE 30 MG/1
30 TABLET, FILM COATED, EXTENDED RELEASE ORAL 2 TIMES DAILY
Status: DISCONTINUED | OUTPATIENT
Start: 2017-12-28 | End: 2018-01-01 | Stop reason: HOSPADM

## 2017-12-28 RX ORDER — CALCITRIOL 0.25 UG/1
0.25 CAPSULE, LIQUID FILLED ORAL AT BEDTIME
Status: DISCONTINUED | OUTPATIENT
Start: 2017-12-28 | End: 2018-01-01 | Stop reason: HOSPADM

## 2017-12-28 RX ORDER — LEVOTHYROXINE SODIUM 112 UG/1
336 TABLET ORAL
Status: DISCONTINUED | OUTPATIENT
Start: 2017-12-31 | End: 2018-01-01 | Stop reason: HOSPADM

## 2017-12-28 RX ORDER — MEPERIDINE HYDROCHLORIDE 50 MG/ML
25 INJECTION INTRAMUSCULAR; INTRAVENOUS; SUBCUTANEOUS EVERY 30 MIN PRN
Status: DISCONTINUED | OUTPATIENT
Start: 2017-12-28 | End: 2018-01-01 | Stop reason: HOSPADM

## 2017-12-28 RX ORDER — BISACODYL 5 MG
15 TABLET, DELAYED RELEASE (ENTERIC COATED) ORAL DAILY PRN
Status: DISCONTINUED | OUTPATIENT
Start: 2017-12-28 | End: 2018-01-01 | Stop reason: HOSPADM

## 2017-12-28 RX ORDER — ONDANSETRON 8 MG/1
16 TABLET, FILM COATED ORAL EVERY 24 HOURS
Status: DISCONTINUED | OUTPATIENT
Start: 2017-12-28 | End: 2017-12-28

## 2017-12-28 RX ORDER — CELECOXIB 200 MG/1
200 CAPSULE ORAL 2 TIMES DAILY
Status: DISCONTINUED | OUTPATIENT
Start: 2017-12-28 | End: 2018-01-01 | Stop reason: HOSPADM

## 2017-12-28 RX ADMIN — POLYETHYLENE GLYCOL 3350 17 G: 17 POWDER, FOR SOLUTION ORAL at 19:50

## 2017-12-28 RX ADMIN — PREDNISONE 60 MG: 50 TABLET ORAL at 09:44

## 2017-12-28 RX ADMIN — CETIRIZINE HYDROCHLORIDE 10 MG: 10 TABLET, FILM COATED ORAL at 14:45

## 2017-12-28 RX ADMIN — CROMOLYN SODIUM 1 DROP: 40 SOLUTION/ DROPS OPHTHALMIC at 19:54

## 2017-12-28 RX ADMIN — METHOCARBAMOL 750 MG: 750 TABLET ORAL at 16:07

## 2017-12-28 RX ADMIN — SENNOSIDES AND DOCUSATE SODIUM 2 TABLET: 8.6; 5 TABLET ORAL at 19:51

## 2017-12-28 RX ADMIN — ETOPOSIDE 120 MG: 20 INJECTION, SOLUTION, CONCENTRATE INTRAVENOUS at 12:37

## 2017-12-28 RX ADMIN — CELECOXIB 200 MG: 200 CAPSULE ORAL at 19:51

## 2017-12-28 RX ADMIN — FLUCONAZOLE 200 MG: 200 TABLET ORAL at 14:44

## 2017-12-28 RX ADMIN — Medication 2 TABLET: at 14:45

## 2017-12-28 RX ADMIN — DIPHENHYDRAMINE HYDROCHLORIDE 50 MG: 50 CAPSULE ORAL at 09:45

## 2017-12-28 RX ADMIN — PROCHLORPERAZINE MALEATE 10 MG: 10 TABLET, FILM COATED ORAL at 12:43

## 2017-12-28 RX ADMIN — LIDOCAINE AND PRILOCAINE: 25; 25 CREAM TOPICAL at 09:20

## 2017-12-28 RX ADMIN — ACETAMINOPHEN 650 MG: 325 TABLET, FILM COATED ORAL at 09:45

## 2017-12-28 RX ADMIN — DIAZEPAM 5 MG: 5 TABLET ORAL at 19:50

## 2017-12-28 RX ADMIN — RANITIDINE 75 MG: 75 TABLET, FILM COATED ORAL at 19:50

## 2017-12-28 RX ADMIN — METHOCARBAMOL 750 MG: 750 TABLET ORAL at 19:51

## 2017-12-28 RX ADMIN — MONTELUKAST SODIUM 20 MG: 10 TABLET, FILM COATED ORAL at 19:51

## 2017-12-28 RX ADMIN — PREDNISONE 60 MG: 50 TABLET ORAL at 19:51

## 2017-12-28 RX ADMIN — OXYCODONE HYDROCHLORIDE 30 MG: 30 TABLET ORAL at 14:44

## 2017-12-28 RX ADMIN — CETIRIZINE HYDROCHLORIDE 10 MG: 10 TABLET, FILM COATED ORAL at 19:51

## 2017-12-28 RX ADMIN — DICLOFENAC SODIUM 2 G: 10 GEL TOPICAL at 19:54

## 2017-12-28 RX ADMIN — OXYCODONE HYDROCHLORIDE 30 MG: 30 TABLET ORAL at 09:45

## 2017-12-28 RX ADMIN — Medication 2 TABLET: at 19:51

## 2017-12-28 RX ADMIN — CALCITRIOL 0.25 MCG: 0.25 CAPSULE, LIQUID FILLED ORAL at 20:12

## 2017-12-28 RX ADMIN — MORPHINE SULFATE 30 MG: 30 TABLET, EXTENDED RELEASE ORAL at 12:43

## 2017-12-28 RX ADMIN — CROMOLYN SODIUM 1 DROP: 40 SOLUTION/ DROPS OPHTHALMIC at 12:43

## 2017-12-28 RX ADMIN — MORPHINE SULFATE 60 MG: 60 TABLET, EXTENDED RELEASE ORAL at 20:11

## 2017-12-28 RX ADMIN — DIAZEPAM 5 MG: 5 TABLET ORAL at 14:44

## 2017-12-28 RX ADMIN — RANITIDINE 75 MG: 75 TABLET, FILM COATED ORAL at 12:43

## 2017-12-28 RX ADMIN — DICLOFENAC SODIUM 2 G: 10 GEL TOPICAL at 16:07

## 2017-12-28 RX ADMIN — ONDANSETRON HYDROCHLORIDE 8 MG: 8 TABLET, FILM COATED ORAL at 19:51

## 2017-12-28 RX ADMIN — ACYCLOVIR 400 MG: 400 TABLET ORAL at 19:52

## 2017-12-28 RX ADMIN — DICLOFENAC SODIUM 2 G: 10 GEL TOPICAL at 12:44

## 2017-12-28 RX ADMIN — VITAMIN D, TAB 1000IU (100/BT) 5000 UNITS: 25 TAB at 20:11

## 2017-12-28 RX ADMIN — METHOTREXATE: 25 INJECTION, SOLUTION INTRA-ARTERIAL; INTRAMUSCULAR; INTRATHECAL; INTRAVENOUS at 14:07

## 2017-12-28 RX ADMIN — LIDOCAINE HYDROCHLORIDE 60 MG: 10 INJECTION, SOLUTION EPIDURAL; INFILTRATION; INTRACAUDAL; PERINEURAL at 13:54

## 2017-12-28 RX ADMIN — VINCRISTINE SULFATE 0.8 MG: 1 INJECTION, SOLUTION INTRAVENOUS at 12:37

## 2017-12-28 RX ADMIN — RITUXIMAB 750 MG: 10 INJECTION, SOLUTION INTRAVENOUS at 10:42

## 2017-12-28 RX ADMIN — OXYCODONE HYDROCHLORIDE 30 MG: 30 TABLET ORAL at 18:57

## 2017-12-28 RX ADMIN — CROMOLYN SODIUM 1 DROP: 40 SOLUTION/ DROPS OPHTHALMIC at 16:07

## 2017-12-28 RX ADMIN — ONDANSETRON HYDROCHLORIDE 16 MG: 8 TABLET, FILM COATED ORAL at 11:36

## 2017-12-28 RX ADMIN — OXYCODONE HYDROCHLORIDE 30 MG: 30 TABLET ORAL at 23:57

## 2017-12-28 ASSESSMENT — PAIN DESCRIPTION - DESCRIPTORS
DESCRIPTORS: ACHING

## 2017-12-28 NOTE — PROGRESS NOTES
DATE/TIME  (DOT-TD, DOT-NOW) CHEMO CHECK ACTIVITY (REGIMEN & DOSE CHECK, DAY, DOSE #, NAME OF CHEMO #1)  CHEMO DRUG #2  CHEMO DRUG #3 NAME OF RN #1 (USE DOT-ME HERE) NAME OF RN#2 (2ND RN TO LOG IN SEPARATELY)   12/28/2017  8:02 AM   Regimen dose double check.   Tamiko Carroll     12/28/2017  10:25 AM   Rituxan dose double check.   Tamiko Griffiths       12/28/2017  12:25 PM   Etoposide Vincristine/Doxorubicin dose double check.  Intrathecal Methoterxate   Tamiko Griffiths     12/29/2017  12:10 PM   Etoposide dose #2 Vincristine/Doxorubicin dose #2  Brando Discala   (Lisa)   12/30/2017  12:31 PM   Etoposide/ Vincristine/Doxorubicin dose # 3   Tamiko Galvez     12/31/2017  11:39 AM   Dose # 4 of Etoposide/Vincristine/Doxoryubicin   Tamiko Galvez     1/1/2018  10:06 AM   Cytoxan double check   Tamiko Archibald

## 2017-12-28 NOTE — H&P
"[facility], [place] -- History and Physical -- Hematology / Oncology  Date of Admission:  12/28/2017 -- Date of Service (when I saw the patient): 12/28/17    Assessment & Plan   Tisha Arias is a 57 year old female with high grade DLBCL and PMHx significant for breast cancer s/p bilateral mastectomies & BENJIE/BSO, papillary thyroid cancer s/p total thyroidectomy, chronic chest wall pain, muscle spasms, asthma and h/o Rachael en Y gastric bypass, who is admitted for Cycle 5 of DA-R-EPOCH.      Problem List:    HEME/ONC  # High grade B cell lymphoma. \"Double hit-like\". Primary is Dr. Sauceda. Patient diagnosed August 2017. Lytic lesion of right 10th rib with extension. Disease localized above the diaphragm in thoracic and paravertebral soft tissue and mediastinal lymphadenopathy. 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 Staging LP and BMBx were negative for lymphoma. Initiated cycle 1 DA-R-EPOCH on 10/6/17. Has completed through 4 cycles. She now presents for cycle 5 DA-R-EPOCH with no changes in dose r/t ANC 0.3 on outpatient labs. Had PET showing CR on 12/21/17.    Treatment plan: Cycle 5 DA-R-EPOCH (Day 1=12/28/17).    -Start chemotherapy today.  -LP with IT chemotherapy (12/28).  -Neulasta 24-72 hours after chemo. Needs to be arranged.  -Discharge with D6-7 Dexamethasone.       #Anemia. 2/2 disease and chemotherapy.  - While inpatient, change transfusion parameters to keep Hgb >8, Plts >10K.   - At hospital discharge, continue to monitor with twice weekly labs and transfuse if Hgb<8 and Plt<10k.       #Stage 1, ER/MD-positive, HER2-negative breast cancer. Follows with Dr. Crawley. Diagnosed in 2011. Oncotype recurrence score of 11 (7% recurrence risk after 5 years of tamoxifen). S/p bilateral mastectomies and BENJIE/BSO in 2012. - Was initially on Arimidex " ([id number]) but changed to Tamoxifen due to arthralgias. Tamoxifen held since 10/5 and continue to hold with chemotherapy. Last f/u Dr. Crawley was on 11/27/17.      # Papillary thyroid cancer. Follows with Dr. Castro. Diagnosed in 2013. S/p total thyroidectomy and CND. Received ETOH treatment August 2014, October 2014 and December 2014. Has post-surgical hypothyroidism.  -Continue PTA Levothyroxine.  -Continue PTA calcitriol.      GI  # H/o Rachael en Y gastric bypass. Likely affecting absorption, thus patient is on multiple supplements.  - Continue supplements (calcium citrate-vitamin D, vitamin D3, magnesium citrate). Also has iron deficiency anemia likely from poor absorption and per Dr. Sauceda, she should continue 325mg PO ferrous sulfate TID (resume outpatient).       Pain  # Chronic chest wall pain after mastectomy.   -Continue MS Contin 30/30/60 mg TID.  -Oxycodone 15-30 mg q4hrs prn.  -Celebrex 200 BID.   -Lidocaine cream prn.  -Palliative consult.      #Chronic muscle cramps.   - Robaxin 750 mg QID, Valium 5 mg TID, Flexeril prn.      ID  #Anti-infectives.  -Continue PTA acyclovir & Fluconazole (per pt request).  - Held Levaquin ppx as patient is not neutropenic.   - Resumed on discharge or when ANC <1000.      CV  #Lymphedema of lower extremities.  - On HCTZ & lasix PTA (I held first two doses, could resume 12/29 if Cr stable with chemotherapy), slightly tachy & .       PULM  #Asthma, allergies.   -Continue PTA singular, zyrtec.  -Patient stated she is not taking her inhalers, not entered.      FEN  -IVF per chemo regimen.  -PTA electrolyte replacements and lyte protocol.  -RDAT.      PPx  -VTE: lovenox held r/t LP on 12/28.  -PUD: PTA ranitidine.  -Bowels: PTA docusate and miralax.     FULL CODE    DISPO: d/c s/p cytoxan on D 5, arrange neulasta in clinic.  - This plan of care has been discussed with Soren BLEVINS.    Merlene Green  [facility]  [phone]  Hematology/Oncology  December 28,  "2017    Primary Care Physician   Shahla Crawley    History of Present Illness   Tisha Arias is a 57 year old female with high grade DLBCL and PMHx significant for breast cancer s/p bilateral mastectomies & BENJIE/BSO, papillary thyroid cancer s/p total thyroidectomy, chronic chest wall pain, muscle spasms, asthma and h/o Rachael en Y gastric bypass, who is admitted for Cycle 4 of DA-R-EPOCH. \"Double hit-like\". Primary is Dr. Sauceda. Patient diagnosed August 2017. Lytic lesion of right 10th rib with extension. Disease localized above the diaphragm in thoracic and paravertebral soft tissue and mediastinal lymphadenopathy. 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 Staging LP and BMBx were negative for lymphoma. She started DA-REPOCH 10/6/17. She did well with chemo other than rigors during CIVI.  Post cycle 1 complicated by mucositis and worsening of chronic LE peripheral edema. Cycle 2 on 10/27/17. Due to a rise in her LD and uric acid levels on that admission day, she underwent a CT scan which showed response to therapy with improvement in her mediastinal lymphadenopathy and right chest wall mass. Cycle 3 on 11/16. Cycle 4 12/7. PET scan with CR on 12/21. Will not get a dose increase this cycle r/t ANC 0.3 in clinic on twice weekly lab checks.     Patient reports she's doing well other than the pain in her chest flared r/t her port needle being the wrong size, and also r/t fatigue. Reports she's still working full time, but takes a nap & rests a lot when she gets home from work. She reports eating well, no N/V, weight loss. No mucositis. Hasn't seen palliative in awhile, will repeat consult so they can visit with her. Denies recent URI, or dysuria. No diarrhea. No questions concerns. We planned LP for today with IT chemotherapy. Discussed & planned out her medication regimen with " the help of nursing. Her chemo was ready to go bright & early. I saw her by 730 AM.     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 No real need for Epipen for this alone.     GDM (gestational diabetes mellitus)     w first pregnancy only     Gestational hypertension      Lymphedema     jackson arms, chest     Migraine      Neuropathy associated with cancer (H)      Papillary carcinoma, follicular variant (H) 2013    pT3, N1, Mx, thyroid     Post-surgical hypothyroidism      Renal disease     kidney stones     Rhinitis, allergic to other allergen      Right Breast mass and microcalcifications 2011     Seasonal allergic conjunctivitis      Seasonal allergic rhinitis 11 skin tests pos. for: dog/M/T/G/W--NEGATIVE FOOD TESTS FOR: shrimp, crab, lobster, coconut       Past Surgical History   Past Surgical History:   Procedure Laterality Date     BACK SURGERY  ,    Bulging disc w nerve root impigment     BIOPSY BREAST      right     BIOPSY BREAST  11    right, core and sterotactic     BONE MARROW BIOPSY, BONE SPECIMEN, NEEDLE/TROCAR Left 10/3/2017    Procedure: BIOPSY BONE MARROW;  Bone Marrow Biopsy with aspirate;  Surgeon: Amelia Watkins PA-C;  Location: [facility] OR     BYPASS GASTRIC, CHOLECYSTECTOMY, COMBINED       C LAPAROSCOPIC GASTRIC RESTRICTIVE PX, W/GASTRIC BYPASS/ GAMALIEL-EN-Y, < 150CM      Gamaliel en Y?      SECTION       COLONOSCOPY      normal     COSMETIC SURGERY  2012    Final Stage of Mastectomy     DAVINCI HYSTERECTOMY TOTAL, BILATERAL SALPINGO-OOPHORECTOMY, COMBINED  2012    Procedure: COMBINED DAVINCI HYSTERECTOMY  TOTAL, SALPINGO-OOPHORECTOMY;  Davinci Total Laparoscopic Hysterectomy, Bilateral Salpingo Oophorectomy, Pelvic Washings, Cystoscopy;  Surgeon: Evie Sheikh MD;  Location: [facility] OR     ENT SURGERY  1982     ESOPHAGOSCOPY, GASTROSCOPY, DUODENOSCOPY (EGD), COMBINED N/A 9/14/2017    Procedure: COMBINED ENDOSCOPIC ULTRASOUND, ESOPHAGOSCOPY, GASTROSCOPY, DUODENOSCOPY (EGD), FINE NEEDLE ASPIRATE/BIOPSY;  Esophagogastroduodenoscopy, Endoscopic Ultrasound, with fine needle biopsy aspirate;  Surgeon: Patrick Robles MD;  Location: [facility] OR     GI SURGERY  11-    Rachael-en Y w cholecystectomy     GYN SURGERY  1/6/89,1/10/92    2 c-sections     HYSTERECTOMY, PAP NO LONGER INDICATED  12/12    DeVinci assister lap hyst with BSO     INSERT PORT VASCULAR ACCESS Right 10/4/2017    Procedure: INSERT PORT VASCULAR ACCESS;  Port Placement;  Surgeon: Jc Goodman PA-C;  Location: [facility] OR     IRRIGATION AND DEBRIDEMENT BREAST  3/1/2012    Procedure:IRRIGATION AND DEBRIDEMENT BREAST; Irrigation and Debridement, Wound Closure Right Breast; Surgeon:JUNG GUILLEN; Location:[facility] OR     MASTECTOMY, RECONSTRUCT BREAST, COMBINED  1/30/2012    Procedure:COMBINED MASTECTOMY, RECONSTRUCT BREAST; Bilateral Mastectomies, Right Axillary [place] Node Biopsy, Bilateral Breast Reconstruction with Tissue Expanders, Reconstruction of inframammary fold, bilateral pain management systems; Surgeon:ANUPAM CAMPBELL; Location:[facility] OR     RECONSTRUCT BREAST  8/31/2012    Procedure: RECONSTRUCT BREAST;  Bilateral 2nd stage breast reconstruction, revision,       SOFT TISSUE SURGERY  1-30-12    Mastectomy w severe myofascial pain syndrome     THYROIDECTOMY  7/10/2013    Procedure: THYROIDECTOMY;  Total Thyroidectomy with central neck dissection;  Surgeon: Indiana Sneed MD;  Location: [facility] OR     TONSILLECTOMY      childhood       Prior to Admission Medications   Prior to Admission Medications   Prescriptions Last Dose Informant  Patient Reported? Taking?   COMPOUND CONTAINING CONTROLLED SUBSTANCE (CMPD RX) - PHARMACY TO MIX COMPOUNDED MEDICATION Unknown at Unknown time  No No   Sig: Apply small amount to affected area two times daily.   Patient not taking: Reported on 2017   [employer] FIBER GUMMY BEARS CHILDREN CHEW 2017 at 2000  No No   Sig: Take 5 g by mouth daily (2 gummy= 5 g =1 serving)   Cholecalciferol (VITAMIN D3) 2000 UNITS CAPS 2017 at 2000 Self Yes No   Sig: Take 5,000 Units by mouth daily Takes 2 tabs daily   EPINEPHrine (EPIPEN 2-CARIDAD) 0.3 MG/0.3ML injection Unknown at Unknown time Self No No   Sig: Inject 0.3 mLs (0.3 mg) into the muscle once as needed for anaphylaxis   Patient not taking: Reported on 2017   Morphine Sulfate (MS CONTIN PO) 2017 at 2000  Yes No   Sig: Take 60 mg by mouth daily Takes at 8pm   Probiotic Product (PROBIOTIC DAILY PO) 2017 at 2000 Self Yes No   Sig: Take 1 capsule by mouth daily Lacto acid bifidobacterium   SUMAtriptan (IMITREX) 50 MG tablet Past Month at Unknown time  Yes Yes   Sig: Take 50 mg by mouth as needed   Triamcinolone Acetonide (AZMACORT IN) Unknown at Unknown time Self Yes No   Sig: Inhale 2 puffs into the lungs as needed   UNABLE TO FIND Unknown at Unknown time Self Yes No   Si tablets 3 times daily MEDICATION NAME: Digestzymes    UNABLE TO FIND Unknown at Unknown time Self Yes No   Si tablet daily MEDICATION NAME: Pure Encapsulations   UNABLE TO FIND Unknown at Unknown time Self Yes No   Sig: 3 tablets 3 times daily MEDICATION NAME: calcium D-Glucarate   3 caps contain 180mg of elemental calcium.   UNABLE TO FIND Unknown at Unknown time Self Yes No   Si tablets 3 times daily MEDICATION NAME: Natural D-Hist (on hold during chemo)   UNABLE TO FIND Unknown at Unknown time Self Yes No   Sig: MEDICATION NAME: Tumeric 3 capsules daily (on hold during chemo)   UNABLE TO FIND Unknown at Unknown time  Yes No   Sig: Take 2 capsules by mouth 3 times daily  Muscle Mag. 2 caps contain B1 20mg, B2 20mg, B6 10mg, magesium 20mg, manganese 2mg.   VENTOLIN  (90 BASE) MCG/ACT Inhaler  Self No No   Sig: INHALE 2 PUFFS INTO THE LUNGS EVERY 4 HOURS AS NEEDED FOR SHORTNESS OF BREATH OR DIFFICULT BREATHING OR WHEEZING   Patient not taking: Reported on 12/7/2017   acetaminophen (TYLENOL) 325 MG tablet 12/27/2017 at 2200  No Yes   Sig: Take 2 tablets (650 mg) by mouth every 4 hours as needed for mild pain or fever   acyclovir (ZOVIRAX) 400 MG tablet 12/28/2017 at 0600  No No   Sig: Take 1 tablet (400 mg) by mouth 2 times daily   allopurinol (ZYLOPRIM) 300 MG tablet 12/27/2017 at 1200  No No   Sig: Take 1 tablet (300 mg) by mouth daily   aluminum chloride (DRYSOL) 20 % external solution Past Week at Unknown time Self No Yes   Sig: Apply topically At Bedtime   bisacodyl (DULCOLAX) 5 MG EC tablet 12/27/2017 at 2000  No Yes   Sig: Take 3 tablets (15 mg) by mouth daily as needed for constipation   calcitRIOL (ROCALTROL) 0.25 MCG capsule 12/28/2017 at 0500  No No   Sig: TAKE 2 CAPSULES BY MOUTH EVERY MORNING AND TAKE 1 CAPSULE BY MOUTH EVERY NIGHT AT BEDTIME   Patient taking differently: TAKE 2 CAPSULES BY MOUTH EVERY MORNING AND TAKE 1 CAPSULE BY MOUTH at noon   calcium carbonate (TUMS) 500 MG chewable tablet More than a month at Unknown time Self Yes No   Sig: Take 2 tablets (1,000 mg) by mouth nightly as needed for other (cramps)   calcium citrate-vitamin D (CALCIUM CITRATE +D) 315-250 MG-UNIT TABS 12/28/2017 at 0600 Self Yes No   Sig: Take 2 tablets by mouth twice a week    celecoxib (CELEBREX) 200 MG capsule 12/28/2017 at 0600  No No   Sig: TAKE ONE CAPSULE BY MOUTH TWICE DAILY    cetirizine (ZYRTEC ALLERGY) 10 MG tablet 12/28/2017 at 0600  No No   Sig: Take 1 tablet (10 mg) by mouth 3 times daily On hold for lab test.   cromolyn (OPTICROM) 4 % ophthalmic solution 12/27/2017 at 0800 Self No No   Sig: Place 1 drop into both eyes 4 times daily   cromolyn sodium (NASALCROM) 5.2  MG/ACT AERS Inhaler Past Month at Unknown time  No Yes   Sig: SPRAY ONE SPRAY( 1 ML) IN NOSTRIL DAILY   cyclobenzaprine (FLEXERIL) 10 MG tablet 12/28/2017 at 0600  No No   Sig: Take 1 tablet (10 mg) by mouth 3 times daily as needed   dexamethasone (DECADRON) 4 MG tablet   No No   Sig: Take 2 tablets (8 mg) by mouth daily for 2 days   diazepam (VALIUM) 5 MG tablet 12/28/2017 at 0600 Self No No   Sig: Take 1 tablet (5 mg) by mouth 3 times daily as needed For muscle spasms   Patient taking differently: Take 5 mg by mouth 3 times daily For muscle spasms   diclofenac (VOLTAREN) 1 % GEL topical gel 12/28/2017 at 0600  No No   Sig: Apply affected area two times daily PRN using enclosed dosing card.   ferrous sulfate (IRON) 325 (65 FE) MG tablet 12/28/2017 at 0600  No No   Sig: Take 1 tablet (325 mg) by mouth 3 times daily (with meals)   fluconazole (DIFLUCAN) 200 MG tablet 12/27/2017 at 1200  No No   Sig: Take 1 tablet (200 mg) by mouth daily   furosemide (LASIX) 20 MG tablet 12/27/2017 at 1200  No No   Sig: Take 1 tablet (20 mg) by mouth 2 times daily   hydrochlorothiazide (MICROZIDE) 12.5 MG capsule 12/27/2017 at 1200 Self No No   Sig: Take 1 capsule (12.5 mg) by mouth daily   levofloxacin (LEVAQUIN) 250 MG tablet 12/28/2017 at 0600  No No   Sig: Take 1 tablet (250 mg) by mouth daily   levothyroxine (SYNTHROID/LEVOTHROID) 112 MCG tablet 12/28/2017 at 0600 Self No No   Sig: Mon-Sat: (2 tabs daily) Sunday: 3 tabs   Patient taking differently: 112 mcg Mon-Sat: (2 tabs daily) Sunday: 3 tabs   levothyroxine (SYNTHROID/LEVOTHROID) 112 MCG tablet 12/24/2017 at 0600  No No   Sig: Take 3 tablets (336 mcg) by mouth once a week   lidocaine (XYLOCAINE) 5 % ointment 12/28/2017 at 0600  No No   Sig: Apply quarter size amount to chest and back up to 3 times daily as needed for pain.   lidocaine-prilocaine (EMLA) cream 12/28/2017 at 0915  No No   Sig: Apply topically as needed (port access pain.)   magic mouthwash suspension  (diphenhydrAMINE, lidocaine, aluminum-magnesium & simethicone) Past Week at Unknown time  No Yes   Sig: Swish and spit 10 mLs in mouth every 6 hours as needed for mouth sores   methocarbamol (ROBAXIN) 750 MG tablet 12/28/2017 at 0600  No No   Sig: Take 1 tablet (750 mg) by mouth 4 times daily as needed . Ok to take a 5th Robaxin on very severe days.   Patient taking differently: Take 750 mg by mouth 4 times daily . Ok to take a 5th Robaxin on very severe days.   montelukast (SINGULAIR) 10 MG tablet 12/28/2017 at 0600  No No   Sig: Take 1 tablet (10 mg) by mouth 2 times daily   Patient taking differently: Take 20 mg by mouth 2 times daily    morphine (MS CONTIN) 30 MG 12 hr tablet 12/28/2017 at 0600  No No   Sig: Take 1 tablet (30 mg) by mouth every 8 hours . Take an addition pill at night such that your evening dose is 60mg   ondansetron (ZOFRAN-ODT) 8 MG ODT tab 12/27/2017 at 1600  No No   Sig: Take 1 tablet (8 mg) by mouth every 8 hours as needed   order for DME   No No   Sig: Equipment being ordered: compression stockings. 20-30 mm or 30 - 40 mm as patient can tolerate. Physical therapy to determine if they should be above or below the knee.   Patient not taking: Reported on 12/13/2017   order for DME   No No   Sig: Equipment being ordered: compression bra   Patient not taking: Reported on 12/7/2017   order for DME   No No   Sig: Compression stockings, medium grade, please measure and fit. Please dispense a pair.   Patient not taking: Reported on 12/13/2017   oxyCODONE IR (ROXICODONE) 30 MG tablet   No No   Sig: Take 1 tablet (30 mg) by mouth every 4 hours as needed for moderate to severe pain   polyethylene glycol (MIRALAX) powder 12/28/2017 at 0500  No No   Sig: Take 17 g (1 capful) by mouth daily   potassium chloride SA (POTASSIUM CHLORIDE) 20 MEQ CR tablet 12/28/2017 at 0600 Self No Yes   Sig: Take 1 tablet (20 mEq) by mouth daily   prochlorperazine (COMPAZINE) 10 MG tablet Past Week at 2000  Yes Yes   Sig:  Take 10 mg by mouth as needed   ranitidine (ZANTAC) 75 MG tablet 12/28/2017 at 0600 Self No No   Sig: Take 1 tablet (75 mg) by mouth 3 times daily   senna-docusate (SENOKOT-S;PERICOLACE) 8.6-50 MG per tablet 12/27/2017 at 2000  No No   Sig: Take 1-2 tablets by mouth 2 times daily as needed for constipation   triamcinolone (KENALOG) 0.025 % ointment Unknown at Unknown time  Yes No   Sig: Apply topically as needed      Facility-Administered Medications: None     Allergies   Allergies   Allergen Reactions     Baclofen Other (See Comments) and Unknown     Other reaction(s): Edema, chest pain, seizures.      No Clinical Screening - See Comments Shortness Of Breath, Palpitations, Anaphylaxis, Itching, Swelling, Difficulty breathing and Rash     Sukhjinder wipes- oral allergy -  July 2015: throat tightness from a Chinese herbal medicine Wilmer Blank Barry Tran     Serotonin Anxiety, Other (See Comments) and Swelling     Seizures      Tree Nuts [Nuts] Shortness Of Breath     Amitriptyline Hcl Swelling     Birch Trees      Potatoes, carrots, cherries, celery, apple, pears, plums, peaches, parsnip, kiwi, hazelnuts, and apricots,      Blue Dyes Itching     Headaches       Cymbalta Other (See Comments)     Flushing, tremor/muscle twitching and edema     Gabapentin Other (See Comments)     edema  Systemic edema, weaned off from Feb to March per Dr. Dowd.    edema     Grass      Mugwort [Artemisia Vulgaris]      Various spices     Ragweeds      Melons, bananas, cucumbers, zucchini.     Topamax      Nortriptyline Itching, Visual Disturbance, Swelling, GI Disturbance, Anxiety, Other (See Comments) and Nausea     Other reaction(s): Swelling       Social History   Social History     Social History     Marital status:      Spouse name: N/A     Number of children: N/A     Years of education: N/A     Occupational History     Not on file.     Social History Main Topics     Smoking status: Never Smoker     Smokeless tobacco: Never Used       Comment: no 2nd hand smoke exposure     Alcohol use No      Comment: rare     Drug use: No     Sexual activity: Not Currently     Partners: Male     Birth control/ protection: Surgical      Comment: Tubal Ligation then hysterectomy     Other Topics Concern     Parent/Sibling W/ Cabg, Mi Or Angioplasty Before 65f 55m? Yes     Adrian Harmeet Martinez     Social History Narrative       Family History   Family History   Problem Relation Age of Onset     Allergies Mother      Arthritis Mother      CANCER Mother      uterine/uterine     Hypertension Mother      on HCTZ     Hyperlipidemia Mother      CEREBROVASCULAR DISEASE Mother      s/p brain surgery     Breast Cancer Mother      Depression Mother      Anxiety Disorder Mother      Anesthesia Reaction Mother      Asthma Mother      Skin Cancer Mother      HEART DISEASE Father      DIABETES Father      Coronary Artery Disease Father      Hypertension Father      Hyperlipidemia Father      CEREBROVASCULAR DISEASE Father      Multiple strokes     Obesity Father      DIABETES Brother      Depression Brother      Hypertension Brother      CANCER Maternal Grandfather      pancreatic cancer/stomach cancer     Prostate Cancer Maternal Grandfather      Prostrate and Bladder     Other Cancer Maternal Grandfather      Pancreatic 1988, Bladder 1977     CANCER Maternal Grandmother      uterine     Breast Cancer Maternal Grandmother      Skin Cancer Maternal Grandmother      CANCER Brother 57     pancreatic cancer     DIABETES Brother      Breast Cancer Cousin      Grand mothers sister     Other Cancer Brother      Stage 3 Pancreatic 2-2015     Depression Brother      Asthma Brother      Obesity Brother      Anesthesia Reaction Other      H/a, itching, drug interactions     Asthma Other      CANCER Brother      Pancreatic      Thyroid Disease No family hx of      Review of Systems   ROS negative unless otherwise noted in above HPI.    Physical Exam   Temp:  [97.4  F (36.3  C)] 97.4  F  (36.3  C)  Heart Rate:  [109] 109  Resp:  [20] 20  BP: (102)/(63) 102/63  SpO2:  [96 %] 96 %  187 lbs 3.2 oz  Constitutional: Awake, alert, cooperative, in NAD. Appears fatigued.   Eyes: PERRL, EOMI, sclera clear, conjunctiva normal.  ENT: Normocephalic, without obvious abnormality, sinuses nontender on palpation, oral pharynx with moist mucus membranes  Respiratory: Non-labored breathing, good air exchange, clear to auscultation bilaterally, no crackles or wheezing.  Cardiovascular: RRR, no murmur noted.  GI: obese, + bowel sounds, soft, non-distended, non-tender, no masses palpated, no hepatosplenomegaly.  Skin: No concerning lesions or rash on exposed areas. Port covered c/d/i.   Musculoskeletal: trace edema b/l LE.   Neurologic: Awake, alert & oriented x3.  Cranial nerves II-XII are grossly intact.   Psych: appropriate affect.    Data   Results for orders placed or performed during the hospital encounter of 12/28/17 (from the past 24 hour(s))   INR   Result Value Ref Range    INR 0.99 0.86 - 1.14   CBC with platelets differential   Result Value Ref Range    WBC 9.1 4.0 - 11.0 10e9/L    RBC Count 3.18 (L) 3.8 - 5.2 10e12/L    Hemoglobin 9.9 (L) 11.7 - 15.7 g/dL    Hematocrit 31.8 (L) 35.0 - 47.0 %     78 - 100 fl    MCH 31.1 26.5 - 33.0 pg    MCHC 31.1 (L) 31.5 - 36.5 g/dL    RDW 22.2 (H) 10.0 - 15.0 %    Platelet Count 279 150 - 450 10e9/L    Diff Method Automated Method     % Neutrophils 94.6 %    % Lymphocytes 3.6 %    % Monocytes 1.1 %    % Eosinophils 0.1 %    % Basophils 0.2 %    % Immature Granulocytes 0.4 %    Nucleated RBCs 0 0 /100    Absolute Neutrophil 8.6 (H) 1.6 - 8.3 10e9/L    Absolute Lymphocytes 0.3 (L) 0.8 - 5.3 10e9/L    Absolute Monocytes 0.1 0.0 - 1.3 10e9/L    Absolute Eosinophils 0.0 0.0 - 0.7 10e9/L    Absolute Basophils 0.0 0.0 - 0.2 10e9/L    Abs Immature Granulocytes 0.0 0 - 0.4 10e9/L    Absolute Nucleated RBC 0.0    Comprehensive metabolic panel   Result Value Ref Range     Sodium 141 133 - 144 mmol/L    Potassium 3.7 3.4 - 5.3 mmol/L    Chloride 103 94 - 109 mmol/L    Carbon Dioxide 33 (H) 20 - 32 mmol/L    Anion Gap 5 3 - 14 mmol/L    Glucose 119 (H) 70 - 99 mg/dL    Urea Nitrogen 18 7 - 30 mg/dL    Creatinine 0.96 0.52 - 1.04 mg/dL    GFR Estimate 59 (L) >60 mL/min/1.7m2    GFR Estimate If Black 72 >60 mL/min/1.7m2    Calcium 8.5 8.5 - 10.1 mg/dL    Bilirubin Total 0.3 0.2 - 1.3 mg/dL    Albumin 3.0 (L) 3.4 - 5.0 g/dL    Protein Total 6.0 (L) 6.8 - 8.8 g/dL    Alkaline Phosphatase 75 40 - 150 U/L    ALT 29 0 - 50 U/L    AST 18 0 - 45 U/L   Magnesium   Result Value Ref Range    Magnesium 1.8 1.6 - 2.3 mg/dL   Phosphorus   Result Value Ref Range    Phosphorus 4.8 (H) 2.5 - 4.5 mg/dL   Uric acid   Result Value Ref Range    Uric Acid 6.6 (H) 2.6 - 6.0 mg/dL     *Note: Due to a large number of results and/or encounters for the requested time period, some results have not been displayed. A complete set of results can be found in Results Review.

## 2017-12-28 NOTE — IP AVS SNAPSHOT
[address] [address]    Phone:  [phone]                                       After Visit Summary   12/28/2017    Tisha Arias    MRN: [id number]           After Visit Summary Signature Page     I have received my discharge instructions, and my questions have been answered. I have discussed any challenges I see with this plan with the nurse or doctor.    ..........................................................................................................................................  Patient/Patient Representative Signature      ..........................................................................................................................................  Patient Representative Print Name and Relationship to Patient    ..................................................               ................................................  Date                                            Time    ..........................................................................................................................................  Reviewed by Signature/Title    ...................................................              ..............................................  Date                                                            Time

## 2017-12-28 NOTE — IP AVS SNAPSHOT
MRN:[id number]                      After Visit Summary   12/28/2017    Tisha Arias    MRN: [id number]           Thank you!     Thank you for choosing [facility] for your care. Our goal is always to provide you with excellent care. Hearing back from our patients is one way we can continue to improve our services. Please take a few minutes to complete the written survey that you may receive in the mail after you visit with us. Thank you!        Patient Information     Date Of Birth          1960        Designated Caregiver       Most Recent Value    Caregiver    Will someone help with your care after discharge? yes    Name of designated caregiver Yomi    Phone number of caregiver [phone]    Caregiver address same address      About your hospital stay     You were admitted on:  December 28, 2017 You last received care in the:  [address] [facility]    You were discharged on:  January 1, 2018        Reason for your hospital stay       You were admitted for cycle 5 REPOCH. You tolerated this well. Your labs and vitals are stable and you are safe to discharge home.                  Who to Call     For medical emergencies, please call 911.  For non-urgent questions about your medical care, please call your primary care provider or clinic, [phone]          Attending Provider     Provider Specialty    Garima Sauceda MD Internal Medicine    [facility], Haroldo Ansari MD Oncology       Primary Care Provider Office Phone # Fax #    Shahla Crawley [phone]       When to contact your care team       Call the [facility] 24-hour triage line at [phone] or [phone] for temp >100.4, uncontrolled nausea/vomiting/diarrhea/constipation, unrelieved pain, bleeding not relieved with pressure, dizziness, chest pain, shortness of breath, loss of consciousness, and any new or concerning symptoms.     Call [phone] and ask for the care coordinator that  works with your oncologist if you have questions about scans, appointments, hospital follow-up, or other concerns.                  After Care Instructions     Activity       Your activity upon discharge: Activity as tolerated.            Diet       Follow this diet upon discharge: Regular diet as tolerated.            Discharge Instructions       -- Please start taking Levofloxacin, Acyclovir and Fluconazole on 1/2/18 in anticipation of your counts dropping.   -- Please take Dexamethasone 8 mg (2 tabs) daily on 1/2/18 and 1/3/18.                  Follow-up Appointments     Adult [facility] Follow-up and recommended labs and tests       Your Neulasta injection was rescheduled for 1/3/18. You also have twice weekly labs with possible transfusions and a follow-up with Rashida Jaffe on 1/18/18.     Appointments on [facility] and/or [facility] (with [facility] or [facility] provider or service). Call [phone] if you haven't heard regarding these appointments within 7 days of discharge.    Please see follow-up dates and times below:                  Your next 10 appointments already scheduled     Jan 02, 2018  9:15 AM CST   [facility] Lab Draw with  [facility] LAB DRAW   [facility] Draw ([facility])    [address] [place] [address]   [phone]            Jan 02, 2018  9:30 AM CST   Infusion 360 with UC ONCOLOGY INFUSION, UC 11 ATC   [facility] ([facility])    [address] [place] [address]   [phone]            Jan 05, 2018  9:30 AM CST   Masonic Lab Draw with UC MASONIC LAB DRAW   [facility] Lab Draw ([facility])    [address] [place] [address]   [phone]            Jan 09, 2018  6:30 AM CST   [facility] Lab Draw with UC [facility]ON[facility] LAB DRAW   [facility]    [address] [place] [address]  "Se  [address] [place] [address]   [phone]            Jan 09, 2018  7:00 AM CST   Infusion 360 with UC ONCOLOGY INFUSION, UC 12 ATC   [facility] ([facility])    [address] [place] [address]   [phone]            Jan 12, 2018 10:00 AM CST   [facility] Lab Draw with UC [facility] LAB DRAW   [facility]    [address] [place] [address]   [phone]            Jan 12, 2018 10:30 AM CST   Infusion 360 with UC ONCOLOGY INFUSION, UC 17 ATC   [facility] ([facility])    [address] [place] [address]   [phone]              Pending Results     Date and Time Order Name Status Description    12/28/2017 1152 CSF Culture Aerobic Bacterial Preliminary             Statement of Approval     Ordered          01/01/18 1025  I have reviewed and agree with all the recommendations and orders detailed in this document.  EFFECTIVE NOW     Approved and electronically signed by:  Kat Dyer PA-C             Admission Information     Date & Time Provider Department Dept. Phone    12/28/2017 Haroldo Doty MD Unit [address] [facility] [place] [phone]      Your Vitals Were     Blood Pressure Pulse Temperature Respirations Height Weight    120/64 (BP Location: Left arm) 79 98.1  F (36.7  C) (Oral) 18 1.651 m (5' 5\") 87.5 kg (193 lb)    Pulse Oximetry BMI (Body Mass Index)                94% 32.12 kg/m2          [employer] Information     [employer] gives you secure access to your electronic health record. If you see a primary care provider, you can also send messages to your care team and make appointments. If you have questions, please call your primary care clinic.  If you do not have a primary care provider, please call [phone] and they will assist you.        Care EveryWhere ID     This is " your Care EveryWhere ID. This could be used by other organizations to access your [facility] medical records  [id number]        Equal Access to Services     RODRIGO ZAMORA : Hadii sacha Venegas, kobejosseline mejiaoluha, timmy erinfuad lama, ranjan caridadin hayaaumm dengerrol padron lajustinoumm concepcion [facility] [phone].    ATENCIÓN: Si habla español, tiene a stiles disposición servicios gratuitos de asistencia lingüística. Llame al [phone].    We comply with applicable federal civil rights laws and [place] laws. We do not discriminate on the basis of race, color, national origin, age, disability, sex, sexual orientation, or gender identity.               Review of your medicines      START taking        Dose / Directions    morphine 30 MG 12 hr tablet   Commonly known as:  MS CONTIN   Used for:  Neoplasm related pain        Dose:  30 mg   Take 1 tablet (30 mg) by mouth every 8 hours . Take an addition pill at night such that your evening dose is 60mg   Quantity:  120 tablet   Refills:  0         CONTINUE these medicines which may have CHANGED, or have new prescriptions. If we are uncertain of the size of tablets/capsules you have at home, strength may be listed as something that might have changed.        Dose / Directions    calcitRIOL 0.25 MCG capsule   Commonly known as:  ROCALTROL   This may have changed:  additional instructions   Used for:  Postsurgical hypothyroidism, Papillary carcinoma, follicular variant (H), Metastasis to cervical lymph node (H)        TAKE 2 CAPSULES BY MOUTH EVERY MORNING AND TAKE 1 CAPSULE BY MOUTH EVERY NIGHT AT BEDTIME   Quantity:  270 capsule   Refills:  3       dexamethasone 4 MG tablet   Commonly known as:  DECADRON   This may have changed:  additional instructions   Used for:  High grade B-cell lymphoma (H)        Dose:  8 mg   Start taking on:  1/2/2018   Take 2 tablets (8 mg) by mouth daily Please take on 1/2/18 and 1/3/18.   Quantity:  4 tablet   Refills:  0       diazepam 5 MG  tablet   Commonly known as:  VALIUM   This may have changed:    - when to take this  - reasons to take this  - additional instructions   Used for:  Chest wall pain        Dose:  5 mg   Take 1 tablet (5 mg) by mouth 3 times daily as needed For muscle spasms   Quantity:  90 tablet   Refills:  2       * levothyroxine 112 MCG tablet   Commonly known as:  SYNTHROID/LEVOTHROID   This may have changed:    - how much to take  - additional instructions   Used for:  Postsurgical hypothyroidism, Papillary carcinoma, follicular variant (H), Postsurgical hypoparathyroidism (H), Thyroid cancer (H)        Mon-Sat: (2 tabs daily) Sunday: 3 tabs   Quantity:  189 tablet   Refills:  3       * levothyroxine 112 MCG tablet   Commonly known as:  SYNTHROID/LEVOTHROID   This may have changed:  Another medication with the same name was changed. Make sure you understand how and when to take each.   Used for:  High grade B-cell lymphoma (H)        Dose:  336 mcg   Take 3 tablets (336 mcg) by mouth once a week   Quantity:  30 tablet   Refills:  0       methocarbamol 750 MG tablet   Commonly known as:  ROBAXIN   This may have changed:    - when to take this  - additional instructions   Used for:  Myofascial pain        Dose:  750 mg   Take 1 tablet (750 mg) by mouth 4 times daily as needed . Ok to take a 5th Robaxin on very severe days.   Quantity:  360 tablet   Refills:  1       montelukast 10 MG tablet   Commonly known as:  SINGULAIR   This may have changed:  how much to take   Used for:  Idiopathic mast cell activation syndrome (H)        Dose:  10 mg   Take 1 tablet (10 mg) by mouth 2 times daily   Quantity:  60 tablet   Refills:  0       * UNABLE TO FIND   This may have changed:  Another medication with the same name was removed. Continue taking this medication, and follow the directions you see here.        Dose:  3 tablet   3 tablets 3 times daily MEDICATION NAME: calcium D-Glucarate  3 caps contain 180mg of elemental calcium.   Refills:   0       * UNABLE TO FIND   This may have changed:  Another medication with the same name was removed. Continue taking this medication, and follow the directions you see here.        Dose:  2 capsule   Take 2 capsules by mouth 3 times daily Muscle Mag. 2 caps contain B1 20mg, B2 20mg, B6 10mg, magesium 20mg, manganese 2mg.   Refills:  0       * UNABLE TO FIND   This may have changed:  Another medication with the same name was removed. Continue taking this medication, and follow the directions you see here.   Used for:  Thyroid cancer (H), Postsurgical hypothyroidism, Postsurgical hypoparathyroidism (H)        Dose:  2 tablet   2 tablets 3 times daily MEDICATION NAME: Digestzymes   Refills:  0       * UNABLE TO FIND   Indication:  [id number]   This may have changed:  Another medication with the same name was removed. Continue taking this medication, and follow the directions you see here.   Used for:  Thyroid cancer (H), Postsurgical hypothyroidism, Postsurgical hypoparathyroidism (H)        Dose:  1 tablet   1 tablet daily MEDICATION NAME: Pure Encapsulations   Refills:  0       * Notice:  This list has 6 medication(s) that are the same as other medications prescribed for you. Read the directions carefully, and ask your doctor or other care provider to review them with you.      CONTINUE these medicines which have NOT CHANGED        Dose / Directions    acetaminophen 325 MG tablet   Commonly known as:  TYLENOL   Used for:  High grade B-cell lymphoma (H)        Dose:  650 mg   Take 2 tablets (650 mg) by mouth every 4 hours as needed for mild pain or fever   Quantity:  100 tablet   Refills:  0       acyclovir 400 MG tablet   Commonly known as:  ZOVIRAX   Indication:  Prophylaxis of Herpes Simplex   Used for:  High grade B-cell lymphoma (H)        Dose:  400 mg   Take 1 tablet (400 mg) by mouth 2 times daily   Quantity:  60 tablet   Refills:  0       allopurinol 300 MG tablet   Commonly known as:  ZYLOPRIM   Used for:  High  grade B-cell lymphoma (H)        Dose:  300 mg   Take 1 tablet (300 mg) by mouth daily   Quantity:  30 tablet   Refills:  0       aluminum chloride 20 % external solution   Commonly known as:  DRYSOL   Used for:  Rash, Intertrigo        Apply topically At Bedtime   Quantity:  60 mL   Refills:  3       AZMACORT IN        Dose:  2 puff   Inhale 2 puffs into the lungs as needed   Refills:  0       bisacodyl 5 MG EC tablet   Used for:  Constipation, unspecified constipation type        Dose:  15 mg   Take 3 tablets (15 mg) by mouth daily as needed for constipation   Quantity:  30 tablet   Refills:  0       CALCIUM CITRATE +D 315-250 MG-UNIT Tabs per tablet   Generic drug:  calcium citrate-vitamin D        Dose:  2 tablet   Take 2 tablets by mouth twice a week   Quantity:  120 tablet   Refills:  0       celecoxib 200 MG capsule   Commonly known as:  celeBREX   Used for:  Chest wall pain        TAKE ONE CAPSULE BY MOUTH TWICE DAILY   Quantity:  56 capsule   Refills:  0       cetirizine 10 MG tablet   Commonly known as:  ZYRTEC ALLERGY   Used for:  High grade B-cell lymphoma (H)        Dose:  10 mg   Take 1 tablet (10 mg) by mouth 3 times daily On hold for lab test.   Quantity:  30 tablet   Refills:  0       COMPOUND CONTAINING CONTROLLED SUBSTANCE - PHARMACY TO MIX COMPOUNDED MEDICATION   Commonly known as:  CMPD RX   Used for:  Neoplasm related pain        Apply small amount to affected area two times daily.   Quantity:  120 g   Refills:  6       cromolyn 4 % ophthalmic solution   Commonly known as:  OPTICROM   Used for:  Idiopathic mast cell activation syndrome (H)        Dose:  1 drop   Place 1 drop into both eyes 4 times daily   Quantity:  10 mL   Refills:  5       cromolyn sodium 5.2 MG/ACT Aers Inhaler   Commonly known as:  NASALCROM   Used for:  Mast cell disease, systemic        SPRAY ONE SPRAY( 1 ML) IN NOSTRIL DAILY   Quantity:  1 Bottle   Refills:  6       [employer] FIBER GUMMY BEARS CHILDREN Chew   Used for:  High  grade B-cell lymphoma (H)        Dose:  5 g   Take 5 g by mouth daily (2 gummy= 5 g =1 serving)   Refills:  0       cyclobenzaprine 10 MG tablet   Commonly known as:  FLEXERIL   Used for:  High grade B-cell lymphoma (H)        Dose:  10 mg   Take 1 tablet (10 mg) by mouth 3 times daily as needed   Quantity:  30 tablet   Refills:  0       diclofenac 1 % Gel topical gel   Commonly known as:  VOLTAREN   Used for:  Myofascial pain        Apply affected area two times daily PRN using enclosed dosing card.   Quantity:  100 g   Refills:  0       EPINEPHrine 0.3 MG/0.3ML injection 2-pack   Commonly known as:  EPIPEN 2-CARIDAD        Dose:  0.3 mg   Inject 0.3 mLs (0.3 mg) into the muscle once as needed for anaphylaxis   Quantity:  0.6 mL   Refills:  3       ferrous sulfate 325 (65 FE) MG tablet   Commonly known as:  IRON   Indication:  Anemia From Inadequate Iron in the Body   Used for:  Status post bariatric surgery        Dose:  325 mg   Take 1 tablet (325 mg) by mouth 3 times daily (with meals)   Quantity:  90 tablet   Refills:  0       fluconazole 200 MG tablet   Commonly known as:  DIFLUCAN   Indication:  Fungal Infection Prophylaxis   Used for:  High grade B-cell lymphoma (H)        Dose:  200 mg   Take 1 tablet (200 mg) by mouth daily   Quantity:  30 tablet   Refills:  0       furosemide 20 MG tablet   Commonly known as:  LASIX   Used for:  Localized edema        Dose:  20 mg   Take 1 tablet (20 mg) by mouth 2 times daily   Quantity:  60 tablet   Refills:  0       hydrochlorothiazide 12.5 MG capsule   Commonly known as:  MICROZIDE   Used for:  Hypocalcemia        Dose:  12.5 mg   Take 1 capsule (12.5 mg) by mouth daily   Quantity:  90 capsule   Refills:  2       levofloxacin 250 MG tablet   Commonly known as:  LEVAQUIN   Indication:  bacterial ppx   Used for:  High grade B-cell lymphoma (H)        Dose:  250 mg   Take 1 tablet (250 mg) by mouth daily   Quantity:  30 tablet   Refills:  0       lidocaine 5 % ointment    Commonly known as:  XYLOCAINE   Used for:  Chest wall pain        Apply quarter size amount to chest and back up to 3 times daily as needed for pain.   Quantity:  350 g   Refills:  1       lidocaine visc 2% 2.5mL/5mL & maalox/mylanta w/ simeth 2.5mL/5mL & diphenhydrAMINE 5mg/5mL Susp suspension   Commonly known as:  [facility] Mouth[facility]   Used for:  Stomatitis and mucositis, High grade B-cell lymphoma (H)        Dose:  10 mL   Swish and spit 10 mLs in mouth every 6 hours as needed for mouth sores   Quantity:  360 mL   Refills:  1       lidocaine-prilocaine cream   Commonly known as:  EMLA   Used for:  Neoplasm related pain, Chest wall pain        Apply topically as needed (port access pain.)   Quantity:  30 g   Refills:  1       ondansetron 8 MG ODT tab   Commonly known as:  ZOFRAN-ODT   Used for:  High grade B-cell lymphoma (H), Nausea and vomiting, intractability of vomiting not specified, unspecified vomiting type        Dose:  8 mg   Take 1 tablet (8 mg) by mouth every 8 hours as needed   Quantity:  30 tablet   Refills:  0       * order for DME   Used for:  Venous stasis        Equipment being ordered: compression stockings. 20-30 mm or 30 - 40 mm as patient can tolerate. Physical therapy to determine if they should be above or below the knee.   Quantity:  2 Units   Refills:  4       * order for DME   Used for:  Malignant neoplasm of right female breast, unspecified site of breast        Equipment being ordered: compression bra   Quantity:  2 Device   Refills:  1       * order for DME   Used for:  Peripheral edema        Compression stockings, medium grade, please measure and fit. Please dispense a pair.   Quantity:  1 Units   Refills:  0       oxyCODONE IR 30 MG tablet   Commonly known as:  ROXICODONE   Used for:  High grade B-cell lymphoma (H)        Dose:  30 mg   Take 1 tablet (30 mg) by mouth every 4 hours as needed for moderate to severe pain   Quantity:  180 tablet   Refills:  0       polyethylene  glycol powder   Commonly known as:  MIRALAX   Used for:  Acute constipation        Dose:  1 capful   Take 17 g (1 capful) by mouth daily   Quantity:  510 g   Refills:  0       potassium chloride SA 20 MEQ CR tablet   Commonly known as:  KLOR-CON   Used for:  Hypokalemia        Dose:  20 mEq   Take 1 tablet (20 mEq) by mouth daily   Quantity:  90 tablet   Refills:  3       PROBIOTIC DAILY PO   Used for:  Breast cancer, unspecified laterality, Thyroid cancer (H), Chronic arthralgias of knees and hips, unspecified laterality        Dose:  1 capsule   Take 1 capsule by mouth daily Lacto acid bifidobacterium   Refills:  0       prochlorperazine 10 MG tablet   Commonly known as:  COMPAZINE        Dose:  10 mg   Take 10 mg by mouth as needed   Refills:  3       ranitidine 75 MG tablet   Commonly known as:  ZANTAC   Used for:  Mast cell disease, systemic        Dose:  75 mg   Take 1 tablet (75 mg) by mouth 3 times daily   Quantity:  270 tablet   Refills:  3       senna-docusate 8.6-50 MG per tablet   Commonly known as:  SENOKOT-S;PERICOLACE   Used for:  Acute constipation        Dose:  1-2 tablet   Take 1-2 tablets by mouth 2 times daily as needed for constipation   Quantity:  60 tablet   Refills:  0       SUMAtriptan 50 MG tablet   Commonly known as:  IMITREX        Dose:  50 mg   Take 50 mg by mouth as needed   Refills:  3       triamcinolone 0.025 % ointment   Commonly known as:  KENALOG        Apply topically as needed   Refills:  3       TUMS 500 MG chewable tablet   Generic drug:  calcium carbonate        Dose:  1.5 chew tab   Take 2 tablets (1,000 mg) by mouth nightly as needed for other (cramps)   Quantity:  180 chew tab   Refills:  3       VENTOLIN  (90 BASE) MCG/ACT Inhaler   Used for:  Mild intermittent asthma without complication   Generic drug:  albuterol        INHALE 2 PUFFS INTO THE LUNGS EVERY 4 HOURS AS NEEDED FOR SHORTNESS OF BREATH OR DIFFICULT BREATHING OR WHEEZING   Quantity:  18 g   Refills:  3        vitamin D3 2000 UNITS Caps   Used for:  Thyroid cancer (H), Postsurgical hypothyroidism, Papillary carcinoma, follicular variant (H), Postsurgical hypoparathyroidism (H)        Dose:  5000 Units   Take 5,000 Units by mouth daily Takes 2 tabs daily   Quantity:  60 capsule   Refills:  4       * Notice:  This list has 3 medication(s) that are the same as other medications prescribed for you. Read the directions carefully, and ask your doctor or other care provider to review them with you.         Where to get your medicines      These medications were sent to [facility] - [place], [place] - [address]  [address] [address]     Phone:  [phone]     dexamethasone 4 MG tablet         Some of these will need a paper prescription and others can be bought over the counter. Ask your nurse if you have questions.     Bring a paper prescription for each of these medications     diazepam 5 MG tablet    morphine 30 MG 12 hr tablet    oxyCODONE IR 30 MG tablet                Protect others around you: Learn how to safely use, store and throw away your medicines at www.disposemymeds.org.             Medication List: This is a list of all your medications and when to take them. Check marks below indicate your daily home schedule. Keep this list as a reference.      Medications           Morning Afternoon Evening Bedtime As Needed    acetaminophen 325 MG tablet   Commonly known as:  TYLENOL   Take 2 tablets (650 mg) by mouth every 4 hours as needed for mild pain or fever   Last time this was given:  650 mg on 12/31/2017  4:40 AM                                acyclovir 400 MG tablet   Commonly known as:  ZOVIRAX   Take 1 tablet (400 mg) by mouth 2 times daily   Last time this was given:  400 mg on 1/1/2018  6:07 AM                                allopurinol 300 MG tablet   Commonly known as:  ZYLOPRIM   Take 1 tablet (300 mg) by mouth daily   Last time this was given:  300 mg on  12/31/2017 12:18 PM                                aluminum chloride 20 % external solution   Commonly known as:  DRYSOL   Apply topically At Bedtime                                AZMACORT IN   Inhale 2 puffs into the lungs as needed                                bisacodyl 5 MG EC tablet   Take 3 tablets (15 mg) by mouth daily as needed for constipation   Last time this was given:  10 mg on 12/30/2017  9:28 PM                                calcitRIOL 0.25 MCG capsule   Commonly known as:  ROCALTROL   TAKE 2 CAPSULES BY MOUTH EVERY MORNING AND TAKE 1 CAPSULE BY MOUTH EVERY NIGHT AT BEDTIME   Last time this was given:  0.5 mcg on 1/1/2018  8:45 AM                                CALCIUM CITRATE +D 315-250 MG-UNIT Tabs per tablet   Take 2 tablets by mouth twice a week   Last time this was given:  2 tablets on 1/1/2018  6:07 AM   Generic drug:  calcium citrate-vitamin D                                celecoxib 200 MG capsule   Commonly known as:  celeBREX   TAKE ONE CAPSULE BY MOUTH TWICE DAILY   Last time this was given:  200 mg on 1/1/2018  6:07 AM                                cetirizine 10 MG tablet   Commonly known as:  ZYRTEC ALLERGY   Take 1 tablet (10 mg) by mouth 3 times daily On hold for lab test.   Last time this was given:  10 mg on 1/1/2018  6:07 AM                                COMPOUND CONTAINING CONTROLLED SUBSTANCE - PHARMACY TO MIX COMPOUNDED MEDICATION   Commonly known as:  CMPD RX   Apply small amount to affected area two times daily.                                cromolyn 4 % ophthalmic solution   Commonly known as:  OPTICROM   Place 1 drop into both eyes 4 times daily   Last time this was given:  1 drop on 1/1/2018  8:45 AM                                cromolyn sodium 5.2 MG/ACT Aers Inhaler   Commonly known as:  NASALCROM   SPRAY ONE SPRAY( 1 ML) IN NOSTRIL DAILY                                [employer] FIBER GUMMY BEARS CHILDREN Chew   Take 5 g by mouth daily (2 gummy= 5 g =1 serving)                                 cyclobenzaprine 10 MG tablet   Commonly known as:  FLEXERIL   Take 1 tablet (10 mg) by mouth 3 times daily as needed   Last time this was given:  10 mg on 12/31/2017  7:59 PM                                dexamethasone 4 MG tablet   Commonly known as:  DECADRON   Take 2 tablets (8 mg) by mouth daily Please take on 1/2/18 and 1/3/18.   Start taking on:  1/2/2018                                diazepam 5 MG tablet   Commonly known as:  VALIUM   Take 1 tablet (5 mg) by mouth 3 times daily as needed For muscle spasms   Last time this was given:  5 mg on 1/1/2018  4:35 AM                                diclofenac 1 % Gel topical gel   Commonly known as:  VOLTAREN   Apply affected area two times daily PRN using enclosed dosing card.   Last time this was given:  2 g on 1/1/2018  8:45 AM                                EPINEPHrine 0.3 MG/0.3ML injection 2-pack   Commonly known as:  EPIPEN 2-CARIDAD   Inject 0.3 mLs (0.3 mg) into the muscle once as needed for anaphylaxis                                ferrous sulfate 325 (65 FE) MG tablet   Commonly known as:  IRON   Take 1 tablet (325 mg) by mouth 3 times daily (with meals)                                fluconazole 200 MG tablet   Commonly known as:  DIFLUCAN   Take 1 tablet (200 mg) by mouth daily   Last time this was given:  200 mg on 12/31/2017 12:18 PM                                furosemide 20 MG tablet   Commonly known as:  LASIX   Take 1 tablet (20 mg) by mouth 2 times daily   Last time this was given:  20 mg on 1/1/2018  8:45 AM                                hydrochlorothiazide 12.5 MG capsule   Commonly known as:  MICROZIDE   Take 1 capsule (12.5 mg) by mouth daily   Last time this was given:  12.5 mg on 1/1/2018  8:45 AM                                levofloxacin 250 MG tablet   Commonly known as:  LEVAQUIN   Take 1 tablet (250 mg) by mouth daily                                * levothyroxine 112 MCG tablet   Commonly known as:   SYNTHROID/LEVOTHROID   Mon-Sat: (2 tabs daily) Sunday: 3 tabs   Last time this was given:  224 mcg on 1/1/2018  6:07 AM                                * levothyroxine 112 MCG tablet   Commonly known as:  SYNTHROID/LEVOTHROID   Take 3 tablets (336 mcg) by mouth once a week   Last time this was given:  224 mcg on 1/1/2018  6:07 AM                                lidocaine 5 % ointment   Commonly known as:  XYLOCAINE   Apply quarter size amount to chest and back up to 3 times daily as needed for pain.                                lidocaine visc 2% 2.5mL/5mL & maalox/mylanta w/ simeth 2.5mL/5mL & diphenhydrAMINE 5mg/5mL Susp suspension   Commonly known as:  MAGIC Mouthwash [facility]   Swish and spit 10 mLs in mouth every 6 hours as needed for mouth sores                                lidocaine-prilocaine cream   Commonly known as:  EMLA   Apply topically as needed (port access pain.)   Last time this was given:  12/28/2017  9:20 AM                                methocarbamol 750 MG tablet   Commonly known as:  ROBAXIN   Take 1 tablet (750 mg) by mouth 4 times daily as needed . Ok to take a 5th Robaxin on very severe days.   Last time this was given:  750 mg on 1/1/2018  6:07 AM                                montelukast 10 MG tablet   Commonly known as:  SINGULAIR   Take 1 tablet (10 mg) by mouth 2 times daily   Last time this was given:  20 mg on 1/1/2018  6:07 AM                                morphine 30 MG 12 hr tablet   Commonly known as:  MS CONTIN   Take 1 tablet (30 mg) by mouth every 8 hours . Take an addition pill at night such that your evening dose is 60mg   Last time this was given:  30 mg on 1/1/2018  6:06 AM                                ondansetron 8 MG ODT tab   Commonly known as:  ZOFRAN-ODT   Take 1 tablet (8 mg) by mouth every 8 hours as needed                                * order for DME   Equipment being ordered: compression stockings. 20-30 mm or 30 - 40 mm as patient can tolerate. Physical  therapy to determine if they should be above or below the knee.                                * order for DME   Equipment being ordered: compression bra                                * order for DME   Compression stockings, medium grade, please measure and fit. Please dispense a pair.                                oxyCODONE IR 30 MG tablet   Commonly known as:  ROXICODONE   Take 1 tablet (30 mg) by mouth every 4 hours as needed for moderate to severe pain   Last time this was given:  30 mg on 1/1/2018  4:35 AM                                polyethylene glycol powder   Commonly known as:  MIRALAX   Take 17 g (1 capful) by mouth daily                                potassium chloride SA 20 MEQ CR tablet   Commonly known as:  KLOR-CON   Take 1 tablet (20 mEq) by mouth daily                                PROBIOTIC DAILY PO   Take 1 capsule by mouth daily Lacto acid bifidobacterium                                prochlorperazine 10 MG tablet   Commonly known as:  COMPAZINE   Take 10 mg by mouth as needed   Last time this was given:  10 mg on 12/30/2017  4:51 PM                                ranitidine 75 MG tablet   Commonly known as:  ZANTAC   Take 1 tablet (75 mg) by mouth 3 times daily   Last time this was given:  75 mg on 1/1/2018  6:07 AM                                senna-docusate 8.6-50 MG per tablet   Commonly known as:  SENOKOT-S;PERICOLACE   Take 1-2 tablets by mouth 2 times daily as needed for constipation   Last time this was given:  2 tablets on 12/31/2017  6:04 AM                                SUMAtriptan 50 MG tablet   Commonly known as:  IMITREX   Take 50 mg by mouth as needed                                triamcinolone 0.025 % ointment   Commonly known as:  KENALOG   Apply topically as needed                                TUMS 500 MG chewable tablet   Take 2 tablets (1,000 mg) by mouth nightly as needed for other (cramps)   Generic drug:  calcium carbonate                                *  UNABLE TO FIND   3 tablets 3 times daily MEDICATION NAME: calcium D-Glucarate  3 caps contain 180mg of elemental calcium.   Last time this was given:  1/1/2018 12:25 AM                                * UNABLE TO FIND   Take 2 capsules by mouth 3 times daily Muscle Mag. 2 caps contain B1 20mg, B2 20mg, B6 10mg, magesium 20mg, manganese 2mg.   Last time this was given:  1/1/2018 12:25 AM                                * UNABLE TO FIND   2 tablets 3 times daily MEDICATION NAME: Digestzymes   Last time this was given:  1/1/2018 12:25 AM                                * UNABLE TO FIND   1 tablet daily MEDICATION NAME: Pure Encapsulations   Last time this was given:  1/1/2018 12:25 AM                                VENTOLIN  (90 BASE) MCG/ACT Inhaler   INHALE 2 PUFFS INTO THE LUNGS EVERY 4 HOURS AS NEEDED FOR SHORTNESS OF BREATH OR DIFFICULT BREATHING OR WHEEZING   Generic drug:  albuterol                                vitamin D3 2000 UNITS Caps   Take 5,000 Units by mouth daily Takes 2 tabs daily                                * Notice:  This list has 9 medication(s) that are the same as other medications prescribed for you. Read the directions carefully, and ask your doctor or other care provider to review them with you.

## 2017-12-28 NOTE — PROCEDURES
[facility], [place]     Neuroradiology Procedure Note     Lumbar Puncture Under Fluoroscopic Guidance:      12/28/2017 3:55 PM    Performed by: Cj Ayon MD  Authorized by: Jd Ayon MD    Indications: Intrathecal Chemotherapy    Consent given by: Patient who states understanding of the procedure being performed after discussing the risks, benefits and alternatives.    Prior to the start of the procedure and with procedural staff participation, I verbally confirmed the patient s identity using two indicators, relevant allergies, that the procedure was appropriate and matched the consent or emergent situation, and that the correct equipment/implants were available. Immediately prior to starting the procedure I conducted the Time Out with the procedural staff and re-confirmed the patient s name, procedure, and site/side. (The Joint Commission universal protocol was followed.) Yes    Procedure:    Under sterile conditions the patient was positioned lying prone. Using fluoroscopy, needle entrance side was defined.  Betadine solution and sterile drapes were utilized.  Local anesthetic at the site: 5 ml of lidocaine 1% without epinephrine.    A 22 gauge 3.5 inch spinal needle was inserted at the L2-L3 interspace.  Opening Pressurewas not checked.  A total of 10mL of clear and colorless spinal fluid was obtained and sent to the laboratory.   After the needle was removed, a bandaid and pressure were applied and the patient was instructed to stay horizontal until the results were back. Intrathecal chemotherapy was administered by the oncologist.   Complications:  None  Patient tolerance: Patient tolerated the procedure well with no immediate complications.    Condition: Stable Patient is transferred to Inpatient unit for post procedure observation.    Cj Ayon 12/28/2017 3:55 PM

## 2017-12-28 NOTE — PROCEDURES
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   Lumbar puncture performed and CSF samples collected by the General Radiology team under fluoroscopy.   This writer then administered methotrexate 12mg in 6ml preservative free NS without apparent complication. Chemotherapy previously double checked by two RNs (Tamiko Garcia RN) and also verified by this writer and Dr. Simental.  Complications: None immediately.   Chemo instillation performed by: Mariza Green CNP with Lomas MD Chris Simental.     Attending addendum:  I was present during the above procedure and supervised Merlene Green.  Chris Simental

## 2017-12-28 NOTE — PLAN OF CARE
Problem: Chemotherapy Effects (Adult)  Goal: Signs and Symptoms of Listed Potential Problems Will be Absent, Minimized or Managed (Chemotherapy Effects)  Signs and symptoms of listed potential problems will be absent, minimized or managed by discharge/transition of care (reference Chemotherapy Effects (Adult) CPG).  Outcome: No Change  Had Rituxan without problems. Chemotherapy infusing without problems. Good bloodreturn from [place]. Went to [facility] For intrathecal chemotherapy. Oxycodone for chest pain with relief. Compazine and Zofran for nausea with relief. Up independently.

## 2017-12-28 NOTE — LETTER
Transition Communication Hand-off for Care Transitions to Next Level of Care Provider    Name: Tisha Arias  MRN #: [id number]  Primary Care Provider: Shahla Crawley     Primary Clinic: [address] [facility] [address]  [facility] [address]    Hematologist: Dr. Sauceda/[facility]     Reason for Hospitalization:  LYMPHOMA  DLBCL (diffuse large B cell lymphoma) (H)  Admit Date/Time: 12/28/2017  7:27 AM  Discharge Date: 1/1/2018  Payor Source: Payor: BCBS / Plan: [place] / Product Type: Indemnity /     Tisha Arias is a 57 year old woman with PMHx of breast cancer s/p bilateral mastectomies and BENJIE/BSO, papillary thyroid cancer s/p total thyroidectomy, chronic chest wall pain, muscle spasms, asthma, h/o RNY gastric bypass, and high grade DLBCL. She was admitted for C5 DA-R-EPOCH for DLBCL.     Discharge Plan:  Home with clinic follow up.     Discharge Needs Assessment:  Needs       Most Recent Value    Transportation Available car, family or friend will provide        Follow-up plan:  Future Appointments  Date Time Provider Department Center   1/5/2018 9:30 AM  [facility] DRAW [facility]   1/9/2018 6:30 AM  [facility] LAB DRAW [facility]   1/9/2018 7:00 AM  ONCOLOGY INFUSION [facility]   1/12/2018 9:00 AM  [facility] LAB DRAW [facility]   1/12/2018 9:30 AM Rashida Jaffe PA [facility]   1/12/2018 10:30 AM  ONCOLOGY INFUSION [facility]   1/15/2018 8:30 AM  [facility] LAB DRAW [facility]   1/15/2018 9:00 AM  ONCOLOGY INFUSION [facility]   1/18/2018 8:00 AM  [facility]ONIC LAB DRAW [facility]   1/18/2018 8:30 AM Rashida Jaffe PA [facility]   3/5/2018 6:45 AM [id number] [place] [address] O   3/6/2018 5:00 PM Garima Sauceda MD [facility]   5/21/2018 4:20 PM Avelina Castro MD [facility]   6/11/2018 4:00 PM Shahla Crawley MD [facility]       Tejal Schrader, RNCC  Phone [phone]  Pager [phone]    AVS/Discharge Summary is the source of truth;  this is a helpful guide for improved communication of patient story

## 2017-12-29 LAB
ANION GAP SERPL CALCULATED.3IONS-SCNC: 7 MMOL/L (ref 3–14)
BASOPHILS # BLD AUTO: 0 10E9/L (ref 0–0.2)
BASOPHILS NFR BLD AUTO: 0.1 %
BUN SERPL-MCNC: 16 MG/DL (ref 7–30)
CALCIUM SERPL-MCNC: 8.3 MG/DL (ref 8.5–10.1)
CHLORIDE SERPL-SCNC: 106 MMOL/L (ref 94–109)
CO2 SERPL-SCNC: 28 MMOL/L (ref 20–32)
COPATH REPORT: NORMAL
CREAT SERPL-MCNC: 0.76 MG/DL (ref 0.52–1.04)
DIFFERENTIAL METHOD BLD: ABNORMAL
EOSINOPHIL # BLD AUTO: 0 10E9/L (ref 0–0.7)
EOSINOPHIL NFR BLD AUTO: 0 %
ERYTHROCYTE [DISTWIDTH] IN BLOOD BY AUTOMATED COUNT: 22.3 % (ref 10–15)
GFR SERPL CREATININE-BSD FRML MDRD: 78 ML/MIN/1.7M2
GLUCOSE SERPL-MCNC: 138 MG/DL (ref 70–99)
HCT VFR BLD AUTO: 30.1 % (ref 35–47)
HGB BLD-MCNC: 9.5 G/DL (ref 11.7–15.7)
IMM GRANULOCYTES # BLD: 0 10E9/L (ref 0–0.4)
IMM GRANULOCYTES NFR BLD: 0.2 %
LYMPHOCYTES # BLD AUTO: 0.4 10E9/L (ref 0.8–5.3)
LYMPHOCYTES NFR BLD AUTO: 4.2 %
MCH RBC QN AUTO: 31.7 PG (ref 26.5–33)
MCHC RBC AUTO-ENTMCNC: 31.6 G/DL (ref 31.5–36.5)
MCV RBC AUTO: 100 FL (ref 78–100)
MONOCYTES # BLD AUTO: 0.4 10E9/L (ref 0–1.3)
MONOCYTES NFR BLD AUTO: 4.2 %
NEUTROPHILS # BLD AUTO: 8.9 10E9/L (ref 1.6–8.3)
NEUTROPHILS NFR BLD AUTO: 91.3 %
NRBC # BLD AUTO: 0 10*3/UL
NRBC BLD AUTO-RTO: 0 /100
PLATELET # BLD AUTO: 277 10E9/L (ref 150–450)
POTASSIUM SERPL-SCNC: 3.9 MMOL/L (ref 3.4–5.3)
RBC # BLD AUTO: 3 10E12/L (ref 3.8–5.2)
SODIUM SERPL-SCNC: 141 MMOL/L (ref 133–144)
WBC # BLD AUTO: 9.8 10E9/L (ref 4–11)

## 2017-12-29 PROCEDURE — 99222 1ST HOSP IP/OBS MODERATE 55: CPT | Mod: GC | Performed by: FAMILY MEDICINE

## 2017-12-29 PROCEDURE — 12000008 ZZH R&B INTERMEDIATE UMMC

## 2017-12-29 PROCEDURE — 85025 COMPLETE CBC W/AUTO DIFF WBC: CPT

## 2017-12-29 PROCEDURE — 25000125 ZZHC RX 250: Performed by: NURSE PRACTITIONER

## 2017-12-29 PROCEDURE — 36592 COLLECT BLOOD FROM PICC: CPT

## 2017-12-29 PROCEDURE — 25000125 ZZHC RX 250

## 2017-12-29 PROCEDURE — 25000132 ZZH RX MED GY IP 250 OP 250 PS 637

## 2017-12-29 PROCEDURE — 25000132 ZZH RX MED GY IP 250 OP 250 PS 637: Performed by: NURSE PRACTITIONER

## 2017-12-29 PROCEDURE — 99232 SBSQ HOSP IP/OBS MODERATE 35: CPT | Performed by: INTERNAL MEDICINE

## 2017-12-29 PROCEDURE — 25000131 ZZH RX MED GY IP 250 OP 636 PS 637

## 2017-12-29 PROCEDURE — 80048 BASIC METABOLIC PNL TOTAL CA: CPT

## 2017-12-29 PROCEDURE — 25000128 H RX IP 250 OP 636

## 2017-12-29 RX ORDER — HYDROCHLOROTHIAZIDE 12.5 MG/1
12.5 CAPSULE ORAL DAILY
Status: DISCONTINUED | OUTPATIENT
Start: 2017-12-29 | End: 2018-01-01 | Stop reason: HOSPADM

## 2017-12-29 RX ORDER — FUROSEMIDE 20 MG
20 TABLET ORAL 2 TIMES DAILY
Status: DISCONTINUED | OUTPATIENT
Start: 2017-12-29 | End: 2018-01-01 | Stop reason: HOSPADM

## 2017-12-29 RX ORDER — OXYCODONE HYDROCHLORIDE 30 MG/1
30 TABLET ORAL EVERY 4 HOURS PRN
Qty: 180 TABLET | Refills: 0 | Status: ON HOLD | OUTPATIENT
Start: 2017-12-29 | End: 2018-01-19

## 2017-12-29 RX ORDER — MORPHINE SULFATE 30 MG/1
30 TABLET, FILM COATED, EXTENDED RELEASE ORAL EVERY 8 HOURS
Qty: 120 TABLET | Refills: 0 | Status: ON HOLD | OUTPATIENT
Start: 2017-12-29 | End: 2018-01-19

## 2017-12-29 RX ORDER — MORPHINE SULFATE 30 MG/1
30 TABLET, FILM COATED, EXTENDED RELEASE ORAL EVERY 8 HOURS
Qty: 120 TABLET | Refills: 0 | Status: SHIPPED | OUTPATIENT
Start: 2017-12-29 | End: 2017-12-29

## 2017-12-29 RX ORDER — OXYCODONE HYDROCHLORIDE 30 MG/1
30 TABLET ORAL EVERY 4 HOURS PRN
Qty: 180 TABLET | Refills: 0 | Status: SHIPPED | OUTPATIENT
Start: 2017-12-29 | End: 2017-12-29

## 2017-12-29 RX ADMIN — ONDANSETRON HYDROCHLORIDE 8 MG: 8 TABLET, FILM COATED ORAL at 04:04

## 2017-12-29 RX ADMIN — OXYCODONE HYDROCHLORIDE 30 MG: 30 TABLET ORAL at 21:08

## 2017-12-29 RX ADMIN — METHOCARBAMOL 750 MG: 750 TABLET ORAL at 05:55

## 2017-12-29 RX ADMIN — DIAZEPAM 5 MG: 5 TABLET ORAL at 04:04

## 2017-12-29 RX ADMIN — LEVOTHYROXINE SODIUM 224 MCG: 112 TABLET ORAL at 05:54

## 2017-12-29 RX ADMIN — CETIRIZINE HYDROCHLORIDE 10 MG: 10 TABLET, FILM COATED ORAL at 05:55

## 2017-12-29 RX ADMIN — CELECOXIB 200 MG: 200 CAPSULE ORAL at 05:55

## 2017-12-29 RX ADMIN — CETIRIZINE HYDROCHLORIDE 10 MG: 10 TABLET, FILM COATED ORAL at 20:47

## 2017-12-29 RX ADMIN — OXYCODONE HYDROCHLORIDE 30 MG: 30 TABLET ORAL at 12:59

## 2017-12-29 RX ADMIN — DICLOFENAC SODIUM 2 G: 10 GEL TOPICAL at 14:28

## 2017-12-29 RX ADMIN — CALCITRIOL 0.5 MCG: 0.5 CAPSULE, LIQUID FILLED ORAL at 05:55

## 2017-12-29 RX ADMIN — ACYCLOVIR 400 MG: 400 TABLET ORAL at 05:55

## 2017-12-29 RX ADMIN — MORPHINE SULFATE 60 MG: 60 TABLET, EXTENDED RELEASE ORAL at 20:47

## 2017-12-29 RX ADMIN — MONTELUKAST SODIUM 20 MG: 10 TABLET, FILM COATED ORAL at 20:47

## 2017-12-29 RX ADMIN — DICLOFENAC SODIUM 2 G: 10 GEL TOPICAL at 08:48

## 2017-12-29 RX ADMIN — ALLOPURINOL 300 MG: 300 TABLET ORAL at 12:59

## 2017-12-29 RX ADMIN — HYDROCHLOROTHIAZIDE 12.5 MG: 12.5 CAPSULE ORAL at 16:54

## 2017-12-29 RX ADMIN — CALCITRIOL 0.5 MCG: 0.5 CAPSULE, LIQUID FILLED ORAL at 08:41

## 2017-12-29 RX ADMIN — Medication 2 TABLET: at 05:55

## 2017-12-29 RX ADMIN — METHOCARBAMOL 750 MG: 750 TABLET ORAL at 20:47

## 2017-12-29 RX ADMIN — CROMOLYN SODIUM 1 DROP: 40 SOLUTION/ DROPS OPHTHALMIC at 20:48

## 2017-12-29 RX ADMIN — PREDNISONE 60 MG: 50 TABLET ORAL at 08:41

## 2017-12-29 RX ADMIN — CALCITRIOL 0.25 MCG: 0.25 CAPSULE, LIQUID FILLED ORAL at 20:47

## 2017-12-29 RX ADMIN — DICLOFENAC SODIUM 2 G: 10 GEL TOPICAL at 20:48

## 2017-12-29 RX ADMIN — OXYCODONE HYDROCHLORIDE 30 MG: 30 TABLET ORAL at 04:04

## 2017-12-29 RX ADMIN — CROMOLYN SODIUM 1 DROP: 40 SOLUTION/ DROPS OPHTHALMIC at 14:28

## 2017-12-29 RX ADMIN — VITAMIN D, TAB 1000IU (100/BT) 5000 UNITS: 25 TAB at 20:47

## 2017-12-29 RX ADMIN — RANITIDINE 75 MG: 75 TABLET, FILM COATED ORAL at 20:47

## 2017-12-29 RX ADMIN — RANITIDINE 75 MG: 75 TABLET, FILM COATED ORAL at 12:59

## 2017-12-29 RX ADMIN — SENNOSIDES AND DOCUSATE SODIUM 2 TABLET: 8.6; 5 TABLET ORAL at 05:59

## 2017-12-29 RX ADMIN — CROMOLYN SODIUM 1 DROP: 40 SOLUTION/ DROPS OPHTHALMIC at 08:48

## 2017-12-29 RX ADMIN — MORPHINE SULFATE 30 MG: 30 TABLET, EXTENDED RELEASE ORAL at 05:55

## 2017-12-29 RX ADMIN — MONTELUKAST SODIUM 20 MG: 10 TABLET, FILM COATED ORAL at 05:55

## 2017-12-29 RX ADMIN — PROCHLORPERAZINE MALEATE 10 MG: 10 TABLET, FILM COATED ORAL at 08:41

## 2017-12-29 RX ADMIN — FLUCONAZOLE 200 MG: 200 TABLET ORAL at 12:59

## 2017-12-29 RX ADMIN — METHOCARBAMOL 750 MG: 750 TABLET ORAL at 12:59

## 2017-12-29 RX ADMIN — METHOCARBAMOL 750 MG: 750 TABLET ORAL at 16:54

## 2017-12-29 RX ADMIN — FUROSEMIDE 20 MG: 20 TABLET ORAL at 20:47

## 2017-12-29 RX ADMIN — MORPHINE SULFATE 30 MG: 30 TABLET, EXTENDED RELEASE ORAL at 12:59

## 2017-12-29 RX ADMIN — DIAZEPAM 5 MG: 5 TABLET ORAL at 12:59

## 2017-12-29 RX ADMIN — CELECOXIB 200 MG: 200 CAPSULE ORAL at 20:47

## 2017-12-29 RX ADMIN — Medication 2 TABLET: at 12:59

## 2017-12-29 RX ADMIN — ACYCLOVIR 400 MG: 400 TABLET ORAL at 20:47

## 2017-12-29 RX ADMIN — VINCRISTINE SULFATE 0.8 MG: 1 INJECTION, SOLUTION INTRAVENOUS at 12:23

## 2017-12-29 RX ADMIN — PREDNISONE 60 MG: 50 TABLET ORAL at 20:47

## 2017-12-29 RX ADMIN — ONDANSETRON HYDROCHLORIDE 8 MG: 8 TABLET, FILM COATED ORAL at 20:47

## 2017-12-29 RX ADMIN — RANITIDINE 75 MG: 75 TABLET, FILM COATED ORAL at 05:55

## 2017-12-29 RX ADMIN — OXYCODONE HYDROCHLORIDE 30 MG: 30 TABLET ORAL at 08:41

## 2017-12-29 RX ADMIN — CETIRIZINE HYDROCHLORIDE 10 MG: 10 TABLET, FILM COATED ORAL at 12:59

## 2017-12-29 RX ADMIN — DIAZEPAM 5 MG: 5 TABLET ORAL at 20:47

## 2017-12-29 RX ADMIN — ONDANSETRON HYDROCHLORIDE 8 MG: 8 TABLET, FILM COATED ORAL at 14:28

## 2017-12-29 RX ADMIN — Medication 2 TABLET: at 20:47

## 2017-12-29 RX ADMIN — OXYCODONE HYDROCHLORIDE 30 MG: 30 TABLET ORAL at 16:54

## 2017-12-29 RX ADMIN — ETOPOSIDE 120 MG: 20 INJECTION, SOLUTION, CONCENTRATE INTRAVENOUS at 12:23

## 2017-12-29 ASSESSMENT — PAIN DESCRIPTION - DESCRIPTORS
DESCRIPTORS: ACHING

## 2017-12-29 NOTE — PLAN OF CARE
Problem: Patient Care Overview  Goal: Plan of Care/Patient Progress Review  Outcome: No Change    AVSS, afebrile. C/o chronic chest pain, oxycodone given q 4 hrs. CIVI chemo infusing, tolerating well, good blood returns q 4 hrs. Voiding well. Cont POC.

## 2017-12-29 NOTE — CONSULTS
[facility]  Palliative Care Consultation         Tisha Arias MRN# [id number]   Age: 57 year old YOB: 1960   Date of Admission: 12/28/2017    Reason for consult: Symptom management  Patient and family support       Requesting physician/service: Annita Chi, Oncology       Recommendations      1. Continue current pain medication regimen of MSContin 30-30-60 mg daily, and oxy IR 30 mg q 4 hours prn (up to 6 tabs daily).  2. Upon discharge, we kindly ask our Oncology colleagues to fill 1 month supply of 30 mg oxycodone (180 tabs) and 30 mg MS Contin(120 tabs).  We've reviewed her refill schedule with Radha Gonzalez RN from our outpatient clinic; these refills are due at the time of discharge; it's more convenient to pick them up at discharge pharmacy.   3.  Follow-up in palliative care clinic with Dr. Benitez in 1 month upon discharge.  Pt has been discussing availability of topical ketamine cream for neuropathy with Dr Benitez/clinic team; (doesn't tolerate the more commonly available ketamine + gabapentin cream).  We've updated clinic team about this.    Attending comments:  Specifically, there was concern about a visit with her oncologist Dr. Sauceda recently; Dr SAINI was concerned that pt may be impaired/overmedicated. We discussed this directly with patient who was adamant and forthcoming that she had just worked two days in a row and was merely incredibly fatigued.     We note that,since being in the hospital for two days, she has been receiving 30/30/60 of MS contin as well as 30 mg oxycodone prn(using 4-5 times a day) and currently does not appear intoxicated or sedated.    Assessment & Symptoms   56 y/o woman with PMH of breast cancer and thyroid cancers s/p definitive treatments complicated by refractory chronic chest wall pain now with new diagnosis of DLBCL in 9/2017 undergoing induction chemotherapy, now hospitalized for cycle 5.      She follows in  "PC clinic with Dr Benitez; outpatient team will continue to assess/evaluate/treat.     Her symptoms are well controlled \"better than they've been for a long time\" on her current regimen which she is happy with.  She continues to push through a great deal of stress, having to work full time in order to keep her insurance.  All things considered she seems to be coping reasonably well based upon our brief interaction.        Patient seen and discussed with Dr. Elva Mcgraw  Palliative Care Fellow PGY-5  [phone]    Pt seen, interviewed and examined by me with fellow. A/P outlined in this note reflect my findings and recommendations.  Edward Stevenson MD  Palliative Medicine Consult Team  Pager: [phone]    TT: 48 minutes, with > 50% spent in C/C/E patient/family/care teams re: GOC, POC, Sx management. [id number]         History of Present Illness   56 y/o woman with PMH of breast cancer and thyroid cancers s/p definitive treatments complicated by refractory chronic chest wall pain now with new diagnosis of DLBCL in 9/2017 undergoing induction chemotherapy, now hospitalized for cycle 5.      Her pain has been well controlled since adjusting her medications back in October.  Taking MS Contin 30 mg in morning and afternoon as well as 60 mg in the evening.  Takes oxycodone 30 mg q4h prn for break through pain.  When she is not at work, she will take oxycodone nearly every four hours when awake.  Continues to use celecoxib, methocarbamol 750 mg QID and cyclobenzaprine 5 mg TID.  Not using voltaren gel as she can not get this covered by her insurance as an out-patient.      She discussed her stresses at work and how chemotherapy has been very hard on her.  When he began to bring up some concerns of her being over medicated at a clinic appointment she very quickly began to understand what we were getting at and referenced the clinic appointment with Dr. Sauceda and said she had just completed two 10 hour shifts at work " and was incredibly tired and fatigued but had not taken any prn doses of oxycodone that day.    Has intermittent constipation that is effectively managed with current bowel regimen.  Continues to see a psychotherapist multiple times a week and this has been helpful.         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 No real need for Epipen for this alone.     GDM (gestational diabetes mellitus)     w first pregnancy only     Gestational hypertension      Lymphedema     jackson arms, chest     Migraine      Neuropathy associated with cancer (H)      Papillary carcinoma, follicular variant (H) 6/28/2013    pT3, N1, Mx, thyroid     Post-surgical hypothyroidism      Renal disease     kidney stones     Rhinitis, allergic to other allergen      Right Breast mass and microcalcifications 12/13/2011     Seasonal allergic conjunctivitis      Seasonal allergic rhinitis 4/12/11 skin tests pos. for: dog/M/T/G/W--NEGATIVE FOOD TESTS FOR: shrimp, crab, lobster, coconut                Past Surgical History:   I have reviewed this patient's past surgical history                Family History:   I have reviewed this patient's family history           Allergies:   Allergies   Allergen Reactions     Baclofen Other (See Comments) and Unknown     Other reaction(s): Edema, chest pain, seizures.      No Clinical Screening - See Comments Shortness Of Breath, Palpitations, Anaphylaxis, Itching, Swelling, Difficulty breathing and Rash     Sukhjinder wipes- oral allergy -  July 2015: throat tightness from a Chinese herbal medicine Wilmer Tran     Serotonin Anxiety, Other (See Comments) and Swelling      Seizures      Tree Nuts [Nuts] Shortness Of Breath     Amitriptyline Hcl Swelling     Birch Trees      Potatoes, carrots, cherries, celery, apple, pears, plums, peaches, parsnip, kiwi, hazelnuts, and apricots,      Blue Dyes Itching     Headaches       Cymbalta Other (See Comments)     Flushing, tremor/muscle twitching and edema     Gabapentin Other (See Comments)     edema  Systemic edema, weaned off from Feb to March per Dr. Dowd.    edema     Grass      Mugwort [Artemisia Vulgaris]      Various spices     Ragweeds      Melons, bananas, cucumbers, zucchini.     Topamax      Nortriptyline Itching, Visual Disturbance, Swelling, GI Disturbance, Anxiety, Other (See Comments) and Nausea     Other reaction(s): Swelling                Medications:   I have reviewed this patient's medication profile and medications during this hospitalization  MSContin 30-30-60 q 8 hours  Oxy IR 30 mg q 4 hours prn           Review of Systems:   The comprehensive review of systems is negative other than noted here and in the HPI.    Palliative Symptom Review (0=no symptom/no concern, 1=mild, 2=moderate, 3=severe):      Pain: 1      Fatigue: 1      Nausea: 0      Constipation: 1      Diarrhea: 0      Depressive Symptoms: 1      Anxiety: 1      Drowsiness: 0      Poor Appetite: 0      Shortness of Breath: 0      Insomnia: 0      Other: 0      Overall (0 good/no concerns, 3 very poor):  1            Physical Exam:   Vitals were reviewed  Temp: 96.5  F (35.8  C) Temp src: Axillary BP: 107/56 Pulse: 87 Heart Rate: 119 Resp: 16 SpO2: 97 % O2 Device: None (Room air)    General: Sitting up in bed, appears comfortable   Pulmonary:  Breathing comfortably on room air   Skin: No rash  Head: Atramautic, bald  Extremities:  No edema   Neuro:  Alert, oriented.  Attention is good.    Psych:  Affect is WNL and appropriate.  Eye contact is good.            Data Reviewed:     ROUTINE ICU LABS (Last four results)  CMP  Recent Labs  Lab 12/29/17  0601  12/28/17  1224 12/27/17  1631 12/26/17  1402    141 139 142   POTASSIUM 3.9 3.7 3.6 3.8   CHLORIDE 106 103 100 103   CO2 28 33* 32 30   ANIONGAP 7 5 6 9   * 119* 108* 138*   BUN 16 18 17 17   CR 0.76 0.96 0.98 1.08*   GFRESTIMATED 78 59* 59* 52*   GFRESTBLACK >90 72 71 63   ELMO 8.3* 8.5 8.6 8.2*   MAG  --  1.8  --   --    PHOS  --  4.8*  --   --    PROTTOTAL  --  6.0* 6.9 6.5*   ALBUMIN  --  3.0* 3.5 3.2*   BILITOTAL  --  0.3 0.3 0.4   ALKPHOS  --  75 93 87   AST  --  18 26 25   ALT  --  29 31 28     CBC  Recent Labs  Lab 12/29/17  0625 12/28/17  1224 12/27/17  1631 12/26/17  1402   WBC 9.8 9.1 8.7 10.6   RBC 3.00* 3.18* 3.45* 3.26*   HGB 9.5* 9.9* 11.0* 10.4*   HCT 30.1* 31.8* 34.7* 32.7*    100 101* 100   MCH 31.7 31.1 31.9 31.9   MCHC 31.6 31.1* 31.7 31.8   RDW 22.3* 22.2* 22.5* 22.8*    279 305 249     INR  Recent Labs  Lab 12/28/17  1224   INR 0.99     Arterial Blood GasNo lab results found in last 7 days.

## 2017-12-29 NOTE — PROGRESS NOTES
Care Coordinator- Discharge Planning     Admission Date/Time:  12/28/2017  Attending MD:  Haroldo Doty, *  Hematologist: Dr. Sauceda/[facility]     Data  Date of initial CC assessment:  12/29/2017  Chart reviewed, discussed with interdisciplinary team.   Patient was admitted for:   1. High grade B-cell lymphoma (H)         Assessment  Concerns with insurance coverage for discharge needs: None.  Current Living Situation: Patient lives with family.  Support System: Supportive and Involved  Services Involved: Outpatient Infusion Services  Transportation: Family or Friend will provide  Barriers to Discharge: Completion of chemotherapy    Coordination of Care and Referrals: Provided patient/family with options for Outpatient Infusion Services.    Writer met with patient to review discharge plan. Patient had no needs other than follow up appointments. Request sent to [facility] for twice a week labs/transfusions.       Plan  Anticipated Discharge Date:  12/31/2017  Anticipated Discharge Plan:  Home with clinic follow up    CTS Handoff completed:  NO (To be sent upon discharge)    Tejal Schrader RNCC  Phone [phone]  Pager [phone]

## 2017-12-29 NOTE — PLAN OF CARE
Problem: Chemotherapy Effects (Adult)  Goal: Signs and Symptoms of Listed Potential Problems Will be Absent, Minimized or Managed (Chemotherapy Effects)  Signs and symptoms of listed potential problems will be absent, minimized or managed by discharge/transition of care (reference Chemotherapy Effects (Adult) CPG).     VSS, afebrile. Continues w/ Day 1 CIVI etoposide & vincristine/doxorubicin, tolerating well thus far. Denies nausea. C/o pain related to chronic chest pain, 30mg oxycodone q 4hrs given x1. Intrathecal chemo site c/d/i. Would like to be woken up and asked about pain overnight. Continue to monitor & w/ POC.

## 2017-12-29 NOTE — PROGRESS NOTES
"[facility], [place]    Hematology / Oncology Progress Note    Date of Admission: 12/28/2017  Hospital Day #: 1      Assessment & Plan   Tisha Arias is a 57 year old woman with PMHx of breast cancer s/p bilateral mastectomies and BENJIE/BSO, papillary thyroid cancer s/p total thyroidectomy, chronic chest wall pain, muscle spasms, asthma, h/o RNY gastric bypass, and high grade DLBCL. She is admitted for C5 DA-R-EPOCH for DLBCL.     HEME/ONC  # High grade DLBCL. \"Double hit-like.\" Primary is Dr. Sauceda. Patient diagnosed August 2017. Lytic lesion of right 10th rib with extension. Disease localized above the diaphragm in thoracic and paravertebral soft tissue and mediastinal lymphadenopathy. 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 Staging LP and BMBx were negative for lymphoma. Initiated cycle 1 DA-R-EPOCH on 10/6/17. Has completed 4 cycles. She now presents for cycle 5 DA-R-EPOCH with no changes in dose r/t ANC 0.3 on outpatient labs. Had PET showing CR on 12/21/17.     Treatment plan: Cycle 5 DA-R-EPOCH (Day 1=12/28/17). Today is day 2.   -LP with IT chemotherapy (completed 12/28). CSF flow pending.   -Neulasta 24-72 hours after chemo. Needs to be scheduled.   -Discharge with D6-7 Dexamethasone.     #Anemia. 2/2 disease and chemotherapy.  -While inpatient, change transfusion parameters to keep Hgb >8, Plts >10K.   -At hospital discharge, continue to monitor with twice weekly labs and transfuse if Hgb <8 and Plt <10k.       #Stage 1, ER/ME-positive, HER2-negative breast cancer. Follows with Dr. Crawley. Diagnosed in 2011. Oncotype recurrence score of 11 (7% recurrence risk after 5 years of tamoxifen). S/p bilateral mastectomies and BENJIE/BSO in 2012. Was initially on Arimidex ([id number]) but changed to Tamoxifen due to arthralgias. Tamoxifen held since " 10/5 and continue to hold with chemotherapy. Last f/u Dr. Crawley was on 11/27/17.      # Papillary thyroid cancer. Follows with Dr. Castro. Diagnosed in 2013. S/p total thyroidectomy and CND. Received ETOH treatment August 2014, October 2014, and December 2014. Has post-surgical hypothyroidism.  -Continue PTA Levothyroxine.  -Continue PTA calcitriol.      GI  # H/o Rachael en Y gastric bypass. Likely affecting absorption, thus patient is on multiple supplements.  -Continue supplements (calcium citrate-vitamin D, vitamin D3, magnesium citrate). Also has iron deficiency anemia likely from poor absorption and per Dr. Sauceda, she should continue 325mg PO ferrous sulfate TID (resume outpatient).       PAIN  # Chronic chest wall pain after mastectomy.   -Continue MS Contin 30/30/60 mg TID.  -Oxycodone 15-30 mg q4hrs prn.  -Celebrex 200 BID.   -Voltaren gel QID, lidocaine cream prn.  -Palliative Care consulted for additional recs.      #Chronic muscle cramps.   -Robaxin 750 mg QID, Valium 5 mg TID, Flexeril prn.      ID  #Anti-infectives.  -Continue PTA acyclovir and fluconazole (per pt request).  -Held Levaquin ppx as patient is not neutropenic. Resumed on discharge or when ANC <1000.      CV  #Lymphedema of lower extremities.  -On HCTZ and lasix PTA. Doses held on admission d/t possible JOSH with chemo but could consider restarting if creat remains stable/wnl.       PULM  #Asthma, allergies.   -Continue PTA singular, zyrtec.  -Patient stated she is not taking her inhalers.      FEN  -IVF per chemo regimen.  -PTA electrolyte replacements and lyte protocol.  -RDAT.      PPx  -VTE: mechanical, ambulates  -PUD: PTA ranitidine  -Bowels: PTA docusate, miralax, added fiber per pt request    Code status: FULL  Disposition: Anticipate d/c on Sunday 12/31 with f/u for Neulasta and labs on Tuesday 1/2.     Annita Chi DNP, APRN, CNP  Hematology/Oncology  Pager: [phone]    Interval History    Elvin reports doing ok.  Endorses fatigue that seems to get progressively worse with each chemo cycle. Had nausea this AM but took compazine and feels better. Had also been constipated until had good BM this AM. O/w doing well. Up ad inocencio, gets winded with stairs and longer walks but o/w breathing well, eating and drinking well. Denies HA, dizziness, chest pain/pressure, cough, sinus pain, fevers, chills, abd pain, vomiting, diarrhea. Chronic chest/breast pain is controlled on current regimen. Notes mild b/l leg swelling. No other immediate concerns.      Physical Exam   Temp: 98.7  F (37.1  C) Temp src: Oral BP: 122/62 Pulse: 86 Heart Rate: 119 Resp: 18 SpO2: 94 % O2 Device: None (Room air)    Vitals:    12/28/17 0811   Weight: 84.9 kg (187 lb 3.2 oz)     Vital Signs with Ranges  Temp:  [95.8  F (35.4  C)-98.7  F (37.1  C)] 98.7  F (37.1  C)  Pulse:  [86] 86  Heart Rate:  [] 119  Resp:  [16-18] 18  BP: ()/(54-62) 122/62  SpO2:  [94 %-98 %] 94 %  I/O last 3 completed shifts:  In: 4327.1 [P.O.:2510; I.V.:20; IV Piggyback:1797.1]  Out: 1800 [Urine:1800]    Constitutional: Pleasant woman seen up ad inocencio in room. Alert and interactive.   HEENT: NCAT. PERRL, EOMI, anicteric sclera. MMM, no lesions or thrush.   Respiratory: Non-labored breathing on RA, good air exchange, lungs clear to auscultation bilaterally. No cough or wheeze noted.   Cardiovascular: Regular rate and rhythm. No murmur or rub.   GI: Normoactive bowel sounds. Abdomen soft, non-distended, mildly TTP in epigastric region.  Genitourinary: Deferred.   Skin: Warm and dry. No concerning lesions or rash on exposed surfaces.  Musculoskeletal: Extremities grossly normal, non-tender, trace non-pitting BLE/pedal edema. Strong peripheral pulses. Good strength and ROM.  Neurologic: A&O, speech normal, no focal deficits.   Neuropsychiatric: Mentation and affect appear normal/appropriate.  Vascular Access: Chest port site is CDI without erythema, swelling, or discharge. +Tender.      Medications   Current Facility-Administered Medications   Medication     psyllium (METAMUCIL/KONSYL) Packet 1 packet     acyclovir (ZOVIRAX) tablet 400 mg     allopurinol (ZYLOPRIM) tablet 300 mg     bisacodyl (DULCOLAX) EC tablet 15 mg     calcitRIOL (ROCALTROL) capsule 0.5 mcg     celecoxib (celeBREX) capsule 200 mg     cromolyn (OPTICROM) 4 % ophthalmic solution 1 drop     cyclobenzaprine (FLEXERIL) tablet 10 mg     diclofenac (VOLTAREN) 1 % topical gel 2 g     lidocaine (XYLOCAINE) 5 % ointment     methocarbamol (ROBAXIN) tablet 750 mg     montelukast (SINGULAIR) tablet 20 mg     oxyCODONE IR (ROXICODONE) tablet 30 mg     polyethylene glycol (MIRALAX/GLYCOLAX) Packet 17 g     ranitidine (ZANTAC) tablet 75 mg     senna-docusate (SENOKOT-S;PERICOLACE) 8.6-50 MG per tablet 1-2 tablet     Medication Instruction     lidocaine-prilocaine (EMLA) cream     naloxone (NARCAN) injection 0.1-0.4 mg     [START ON 1/1/2018] fosaprepitant (EMEND) 150 mg in NaCl 0.9 % intermittent infusion     [START ON 1/2/2018] dexamethasone (DECADRON) tablet 8 mg     predniSONE (DELTASONE) tablet 60 mg     Chemotherapy Infusing-Continuous Infusion     etoposide (TOPOSAR) 120 mg in NaCl 0.9 % 556 mL CHEMOTHERAPY     vinCRIStine (ONCOVIN) 0.8 mg, DOXOrubicin (ADRIAMYCIN) 24 mg in NaCl 0.9 % 1,063 mL CHEMOTHERAPY     [START ON 1/1/2018] cyclophosphamide (CYTOXAN) 1,810 mg in NaCl 0.9 % 641 mL CHEMOTHERAPY     [START ON 1/2/2018] filgrastim 15 mcg/mL (in Dextrose) (NEUPOGEN) infusion 400 mcg     senna-docusate (SENOKOT-S;PERICOLACE) 8.6-50 MG per tablet 2 tablet     prochlorperazine (COMPAZINE) tablet 10 mg     prochlorperazine (COMPAZINE) injection 10 mg     LORazepam (ATIVAN) tablet 0.5-1 mg     LORazepam (ATIVAN) injection 0.5-1 mg     MEDICATION INSTRUCTION     methylPREDNISolone sodium succinate (solu-MEDROL) injection 125 mg     diphenhydrAMINE (BENADRYL) injection 50 mg     meperidine (DEMEROL) injection 25 mg     EPINEPHrine PF  (ADRENALIN) injection 0.3 mg     albuterol (PROAIR HFA/PROVENTIL HFA/VENTOLIN HFA) Inhaler 1-2 puff     albuterol neb solution 2.5 mg     0.9% sodium chloride infusion     cetirizine (zyrTEC) tablet 10 mg     morphine (MS CONTIN) 12 hr tablet 30 mg     morphine (MS CONTIN) 12 hr tablet 60 mg     sodium chloride (PF) 0.9% PF flush 10-20 mL     heparin lock flush 10 UNIT/ML injection 5-10 mL     heparin lock flush 10 UNIT/ML injection 5-10 mL     heparin 100 UNIT/ML injection 5 mL     sodium chloride (PF) 0.9% PF flush 10-20 mL     diazepam (VALIUM) tablet 5 mg     calcitRIOL (ROCALTROL) capsule 0.5 mcg     calcitRIOL (ROCALTROL) capsule 0.25 mcg     cholecalciferol (vitamin D3) tablet 5,000 Units     calcium citrate-vitamin D (CITRACAL) 315-250 MG-UNIT per tablet 2 tablet     levothyroxine (SYNTHROID/LEVOTHROID) tablet 224 mcg     [START ON 12/31/2017] levothyroxine (SYNTHROID/LEVOTHROID) tablet 336 mcg     fluconazole (DIFLUCAN) tablet 200 mg     ondansetron (ZOFRAN) tablet 8 mg     Facility-Administered Medications Ordered in Other Encounters   Medication     heparin 100 UNIT/ML injection 5 mL     sodium chloride (PF) 0.9% PF flush 20 mL     heparin 100 UNIT/ML injection 5 mL       Data   CBC  Recent Labs  Lab 12/29/17  0625 12/28/17  1224 12/27/17  1631 12/26/17  1402   WBC 9.8 9.1 8.7 10.6   RBC 3.00* 3.18* 3.45* 3.26*   HGB 9.5* 9.9* 11.0* 10.4*   HCT 30.1* 31.8* 34.7* 32.7*    100 101* 100   MCH 31.7 31.1 31.9 31.9   MCHC 31.6 31.1* 31.7 31.8   RDW 22.3* 22.2* 22.5* 22.8*    279 305 249     CMP  Recent Labs  Lab 12/29/17  0625 12/28/17  1224 12/27/17  1631 12/26/17  1402    141 139 142   POTASSIUM 3.9 3.7 3.6 3.8   CHLORIDE 106 103 100 103   CO2 28 33* 32 30   ANIONGAP 7 5 6 9   * 119* 108* 138*   BUN 16 18 17 17   CR 0.76 0.96 0.98 1.08*   GFRESTIMATED 78 59* 59* 52*   GFRESTBLACK >90 72 71 63   ELMO 8.3* 8.5 8.6 8.2*   MAG  --  1.8  --   --    PHOS  --  4.8*  --   --    PROTTOTAL  --   6.0* 6.9 6.5*   ALBUMIN  --  3.0* 3.5 3.2*   BILITOTAL  --  0.3 0.3 0.4   ALKPHOS  --  75 93 87   AST  --  18 26 25   ALT  --  29 31 28     INR  Recent Labs  Lab 12/28/17  1224   INR 0.99       Results for orders placed or performed during the hospital encounter of 12/28/17   XR Lumbar Punc Intrathecal Chemo Admin    Narrative    Lumbar Puncture using Fluoroscopy    History: Intrathecal chemotherapy    Procedure note: Verbal and written consent for lumbar puncture was  obtained from the patient, and benefits and risk of the procedure were  explained, including but not limited to worsening headache,  hemorrhage, infection, lower extremity pain, or nerve root injury. The  patient was sterilely prepped and draped with the patient in the  prone/decubitus position, over the lower back. Under fluoroscopic  guidance, the interlaminar spaces were noted. 1% lidocaine was  administered for local anesthetic over the L2-3 interlaminar space,  and a 22 gauge 3.5 inch needle was advanced into the thecal sac under  fluoroscopic guidance.  There was initial show of clear CSF.  Approximately 10 cc of CSF were collected.    The needle was removed with the stylet in place. There was no  immediate complication associated with the procedure. Samples were  sent for the requested laboratory testing. Intrathecal chemotherapy  was administered by the oncologist.     Estimated blood loss: Less than 1 cc.    Fluoroscopic time: 24 seconds      Impression    Impression: Successful lumbar puncture without immediate complication.    I, JORGE L LEWIS MD, attest that I was present for all critical  portions of the procedure and was immediately available to provide  guidance and assistance during the remainder of the procedure.    I have personally reviewed the examination and initial interpretation  and I agree with the findings.    JORGE L LEWIS MD     *Note: Due to a large number of results and/or encounters for the requested time period, some  results have not been displayed. A complete set of results can be found in Results Review.

## 2017-12-30 LAB
ANION GAP SERPL CALCULATED.3IONS-SCNC: 8 MMOL/L (ref 3–14)
BASOPHILS # BLD AUTO: 0 10E9/L (ref 0–0.2)
BASOPHILS NFR BLD AUTO: 0 %
BUN SERPL-MCNC: 19 MG/DL (ref 7–30)
CALCIUM SERPL-MCNC: 8 MG/DL (ref 8.5–10.1)
CHLORIDE SERPL-SCNC: 107 MMOL/L (ref 94–109)
CO2 SERPL-SCNC: 27 MMOL/L (ref 20–32)
CREAT SERPL-MCNC: 0.68 MG/DL (ref 0.52–1.04)
DIFFERENTIAL METHOD BLD: ABNORMAL
EOSINOPHIL # BLD AUTO: 0 10E9/L (ref 0–0.7)
EOSINOPHIL NFR BLD AUTO: 0 %
ERYTHROCYTE [DISTWIDTH] IN BLOOD BY AUTOMATED COUNT: 22.2 % (ref 10–15)
GFR SERPL CREATININE-BSD FRML MDRD: 89 ML/MIN/1.7M2
GLUCOSE SERPL-MCNC: 161 MG/DL (ref 70–99)
HCT VFR BLD AUTO: 28.5 % (ref 35–47)
HGB BLD-MCNC: 8.8 G/DL (ref 11.7–15.7)
IMM GRANULOCYTES # BLD: 0 10E9/L (ref 0–0.4)
IMM GRANULOCYTES NFR BLD: 0.3 %
LYMPHOCYTES # BLD AUTO: 0.5 10E9/L (ref 0.8–5.3)
LYMPHOCYTES NFR BLD AUTO: 4.3 %
MCH RBC QN AUTO: 31.2 PG (ref 26.5–33)
MCHC RBC AUTO-ENTMCNC: 30.9 G/DL (ref 31.5–36.5)
MCV RBC AUTO: 101 FL (ref 78–100)
MONOCYTES # BLD AUTO: 0.3 10E9/L (ref 0–1.3)
MONOCYTES NFR BLD AUTO: 2.9 %
NEUTROPHILS # BLD AUTO: 9.8 10E9/L (ref 1.6–8.3)
NEUTROPHILS NFR BLD AUTO: 92.5 %
NRBC # BLD AUTO: 0 10*3/UL
NRBC BLD AUTO-RTO: 0 /100
PLATELET # BLD AUTO: 271 10E9/L (ref 150–450)
POTASSIUM SERPL-SCNC: 3.7 MMOL/L (ref 3.4–5.3)
RBC # BLD AUTO: 2.82 10E12/L (ref 3.8–5.2)
SODIUM SERPL-SCNC: 142 MMOL/L (ref 133–144)
WBC # BLD AUTO: 10.6 10E9/L (ref 4–11)

## 2017-12-30 PROCEDURE — 25000128 H RX IP 250 OP 636

## 2017-12-30 PROCEDURE — 25000131 ZZH RX MED GY IP 250 OP 636 PS 637

## 2017-12-30 PROCEDURE — 85025 COMPLETE CBC W/AUTO DIFF WBC: CPT

## 2017-12-30 PROCEDURE — 99232 SBSQ HOSP IP/OBS MODERATE 35: CPT | Performed by: INTERNAL MEDICINE

## 2017-12-30 PROCEDURE — 25000132 ZZH RX MED GY IP 250 OP 250 PS 637

## 2017-12-30 PROCEDURE — 25000125 ZZHC RX 250

## 2017-12-30 PROCEDURE — 80048 BASIC METABOLIC PNL TOTAL CA: CPT

## 2017-12-30 PROCEDURE — 25000132 ZZH RX MED GY IP 250 OP 250 PS 637: Performed by: NURSE PRACTITIONER

## 2017-12-30 PROCEDURE — 12000008 ZZH R&B INTERMEDIATE UMMC

## 2017-12-30 PROCEDURE — 36592 COLLECT BLOOD FROM PICC: CPT

## 2017-12-30 PROCEDURE — 25000125 ZZHC RX 250: Performed by: NURSE PRACTITIONER

## 2017-12-30 RX ADMIN — CETIRIZINE HYDROCHLORIDE 10 MG: 10 TABLET, FILM COATED ORAL at 11:51

## 2017-12-30 RX ADMIN — DIAZEPAM 5 MG: 5 TABLET ORAL at 21:13

## 2017-12-30 RX ADMIN — HYDROCHLOROTHIAZIDE 12.5 MG: 12.5 CAPSULE ORAL at 08:39

## 2017-12-30 RX ADMIN — METHOCARBAMOL 750 MG: 750 TABLET ORAL at 16:13

## 2017-12-30 RX ADMIN — DICLOFENAC SODIUM 2 G: 10 GEL TOPICAL at 16:13

## 2017-12-30 RX ADMIN — VITAMIN D, TAB 1000IU (100/BT) 5000 UNITS: 25 TAB at 21:11

## 2017-12-30 RX ADMIN — DICLOFENAC SODIUM 2 G: 10 GEL TOPICAL at 21:13

## 2017-12-30 RX ADMIN — METHOCARBAMOL 750 MG: 750 TABLET ORAL at 06:06

## 2017-12-30 RX ADMIN — DIAZEPAM 5 MG: 5 TABLET ORAL at 11:51

## 2017-12-30 RX ADMIN — RANITIDINE 75 MG: 75 TABLET, FILM COATED ORAL at 21:12

## 2017-12-30 RX ADMIN — RANITIDINE 75 MG: 75 TABLET, FILM COATED ORAL at 11:50

## 2017-12-30 RX ADMIN — CELECOXIB 200 MG: 200 CAPSULE ORAL at 21:12

## 2017-12-30 RX ADMIN — SENNOSIDES AND DOCUSATE SODIUM 2 TABLET: 8.6; 5 TABLET ORAL at 06:06

## 2017-12-30 RX ADMIN — LEVOTHYROXINE SODIUM 224 MCG: 112 TABLET ORAL at 06:07

## 2017-12-30 RX ADMIN — FUROSEMIDE 20 MG: 20 TABLET ORAL at 08:40

## 2017-12-30 RX ADMIN — MORPHINE SULFATE 30 MG: 30 TABLET, EXTENDED RELEASE ORAL at 06:06

## 2017-12-30 RX ADMIN — ALLOPURINOL 300 MG: 300 TABLET ORAL at 11:50

## 2017-12-30 RX ADMIN — MONTELUKAST SODIUM 20 MG: 10 TABLET, FILM COATED ORAL at 21:13

## 2017-12-30 RX ADMIN — PREDNISONE 60 MG: 50 TABLET ORAL at 21:13

## 2017-12-30 RX ADMIN — RANITIDINE 75 MG: 75 TABLET, FILM COATED ORAL at 06:04

## 2017-12-30 RX ADMIN — DIAZEPAM 5 MG: 5 TABLET ORAL at 04:27

## 2017-12-30 RX ADMIN — CALCITRIOL 0.5 MCG: 0.5 CAPSULE, LIQUID FILLED ORAL at 06:05

## 2017-12-30 RX ADMIN — ONDANSETRON HYDROCHLORIDE 8 MG: 8 TABLET, FILM COATED ORAL at 21:13

## 2017-12-30 RX ADMIN — MONTELUKAST SODIUM 20 MG: 10 TABLET, FILM COATED ORAL at 06:06

## 2017-12-30 RX ADMIN — Medication 2 TABLET: at 12:45

## 2017-12-30 RX ADMIN — FLUCONAZOLE 200 MG: 200 TABLET ORAL at 11:51

## 2017-12-30 RX ADMIN — DICLOFENAC SODIUM 2 G: 10 GEL TOPICAL at 08:41

## 2017-12-30 RX ADMIN — CELECOXIB 200 MG: 200 CAPSULE ORAL at 06:06

## 2017-12-30 RX ADMIN — BISACODYL 10 MG: 5 TABLET, COATED ORAL at 21:28

## 2017-12-30 RX ADMIN — PREDNISONE 60 MG: 50 TABLET ORAL at 08:40

## 2017-12-30 RX ADMIN — ONDANSETRON HYDROCHLORIDE 8 MG: 8 TABLET, FILM COATED ORAL at 11:50

## 2017-12-30 RX ADMIN — METHOCARBAMOL 750 MG: 750 TABLET ORAL at 21:12

## 2017-12-30 RX ADMIN — METHOCARBAMOL 750 MG: 750 TABLET ORAL at 11:51

## 2017-12-30 RX ADMIN — MORPHINE SULFATE 60 MG: 60 TABLET, EXTENDED RELEASE ORAL at 21:13

## 2017-12-30 RX ADMIN — Medication 2 TABLET: at 06:06

## 2017-12-30 RX ADMIN — FUROSEMIDE 20 MG: 20 TABLET ORAL at 21:13

## 2017-12-30 RX ADMIN — Medication 2 TABLET: at 21:12

## 2017-12-30 RX ADMIN — ONDANSETRON HYDROCHLORIDE 8 MG: 8 TABLET, FILM COATED ORAL at 04:27

## 2017-12-30 RX ADMIN — OXYCODONE HYDROCHLORIDE 30 MG: 30 TABLET ORAL at 16:19

## 2017-12-30 RX ADMIN — CETIRIZINE HYDROCHLORIDE 10 MG: 10 TABLET, FILM COATED ORAL at 21:13

## 2017-12-30 RX ADMIN — CALCITRIOL 0.5 MCG: 0.5 CAPSULE, LIQUID FILLED ORAL at 08:40

## 2017-12-30 RX ADMIN — ACYCLOVIR 400 MG: 400 TABLET ORAL at 06:06

## 2017-12-30 RX ADMIN — CALCITRIOL 0.25 MCG: 0.25 CAPSULE, LIQUID FILLED ORAL at 21:28

## 2017-12-30 RX ADMIN — OXYCODONE HYDROCHLORIDE 30 MG: 30 TABLET ORAL at 04:37

## 2017-12-30 RX ADMIN — CROMOLYN SODIUM 1 DROP: 40 SOLUTION/ DROPS OPHTHALMIC at 16:13

## 2017-12-30 RX ADMIN — DICLOFENAC SODIUM 2 G: 10 GEL TOPICAL at 12:45

## 2017-12-30 RX ADMIN — CETIRIZINE HYDROCHLORIDE 10 MG: 10 TABLET, FILM COATED ORAL at 06:06

## 2017-12-30 RX ADMIN — CROMOLYN SODIUM 1 DROP: 40 SOLUTION/ DROPS OPHTHALMIC at 08:40

## 2017-12-30 RX ADMIN — PROCHLORPERAZINE MALEATE 10 MG: 10 TABLET, FILM COATED ORAL at 08:55

## 2017-12-30 RX ADMIN — CROMOLYN SODIUM 1 DROP: 40 SOLUTION/ DROPS OPHTHALMIC at 21:13

## 2017-12-30 RX ADMIN — VINCRISTINE SULFATE 0.8 MG: 1 INJECTION, SOLUTION INTRAVENOUS at 12:36

## 2017-12-30 RX ADMIN — CROMOLYN SODIUM 1 DROP: 40 SOLUTION/ DROPS OPHTHALMIC at 12:45

## 2017-12-30 RX ADMIN — OXYCODONE HYDROCHLORIDE 30 MG: 30 TABLET ORAL at 21:28

## 2017-12-30 RX ADMIN — PROCHLORPERAZINE MALEATE 10 MG: 10 TABLET, FILM COATED ORAL at 16:51

## 2017-12-30 RX ADMIN — ACYCLOVIR 400 MG: 400 TABLET ORAL at 21:13

## 2017-12-30 RX ADMIN — MORPHINE SULFATE 30 MG: 30 TABLET, EXTENDED RELEASE ORAL at 11:51

## 2017-12-30 RX ADMIN — ETOPOSIDE 120 MG: 20 INJECTION, SOLUTION, CONCENTRATE INTRAVENOUS at 12:43

## 2017-12-30 ASSESSMENT — PAIN DESCRIPTION - DESCRIPTORS
DESCRIPTORS: ACHING;CONSTANT

## 2017-12-30 NOTE — PLAN OF CARE
Problem: Chemotherapy Effects (Adult)  Goal: Signs and Symptoms of Listed Potential Problems Will be Absent, Minimized or Managed (Chemotherapy Effects)  Signs and symptoms of listed potential problems will be absent, minimized or managed by discharge/transition of care (reference Chemotherapy Effects (Adult) CPG).   Outcome: No Change  VSS. Afebrile. Oxy x 1 for chronic chest pain. CIVI Etoposide/vincristine/Dox infusing to R) port, good blood return. Denies Nausea/vomiting. Voiding spontaneously, not saving. Will continue w/POC.

## 2017-12-30 NOTE — PROGRESS NOTES
"[facility], [place]    Hematology / Oncology Progress Note    Date of Admission: 12/28/2017  Hospital Day #: 3      Assessment & Plan   Tisha Arias is a 57 year old woman with PMHx of breast cancer s/p bilateral mastectomies and BENJIE/BSO, papillary thyroid cancer s/p total thyroidectomy, chronic chest wall pain, muscle spasms, asthma, h/o RNY gastric bypass, and high grade DLBCL. She is admitted for C5 DA-R-EPOCH for DLBCL.     HEME/ONC  # High grade DLBCL: \"Double hit-like.\" Primary is Dr. Sauceda. Patient diagnosed August 2017. Lytic lesion of right 10th rib with extension. Disease localized above the diaphragm in thoracic and paravertebral soft tissue and mediastinal lymphadenopathy. 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 Staging LP and BMBx were negative for lymphoma. Initiated cycle 1 DA-R-EPOCH on 10/6/17. Has completed 4 cycles. She now presents for cycle 5 DA-R-EPOCH with no changes in dose r/t ANC 0.3 on outpatient labs. Had PET showing CR on 12/21/17.     Treatment plan: Cycle 5 DA-R-EPOCH (Day 1=12/28/17). Today is day 3.   -LP with IT chemotherapy (completed 12/28). CSF flow pending.   -Neulasta 24-72 hours after chemo. Needs to be scheduled.   -Discharge with D6-7 Dexamethasone.     #Anemia. 2/2 disease and chemotherapy.  -While inpatient, change transfusion parameters to keep Hgb >8, Plts >10K.   -At hospital discharge, continue to monitor with twice weekly labs and transfuse if Hgb <8 and Plt <10k.       #Stage 1, ER/TX-positive, HER2-negative breast cancer. Follows with Dr. Crawley. Diagnosed in 2011. Oncotype recurrence score of 11 (7% recurrence risk after 5 years of tamoxifen). S/p bilateral mastectomies and BENJIE/BSO in 2012. Was initially on Arimidex ([id number]) but changed to Tamoxifen due to arthralgias. Tamoxifen held since " 10/5 and continue to hold with chemotherapy. Last f/u Dr. Crawley was on 11/27/17.      # Papillary thyroid cancer:  - Follows with Dr. Castro. Diagnosed in 2013. S/p total thyroidectomy and CND. Received ETOH treatment August 2014, October 2014, and December 2014. Has post-surgical hypothyroidism.  -Continue PTA Levothyroxine.  -Continue PTA calcitriol.      GI  # H/o Rachael en Y gastric bypass:   -Likely affecting absorption, thus patient is on multiple supplements.  -Continue supplements (calcium citrate-vitamin D, vitamin D3, magnesium citrate). Also has iron deficiency anemia likely from poor absorption and per Dr. Sauceda, she should continue 325mg PO ferrous sulfate TID (resume outpatient).       PAIN  # Chronic chest wall pain after mastectomy.   -Continue MS Contin 30/30/60 mg TID.  -Oxycodone 15-30 mg q4hrs prn.  -Celebrex 200 BID.   -Voltaren gel QID, lidocaine cream prn.  -Palliative Care consulted for additional recs.  -Pain well managed on current regimen.       #Chronic muscle cramps.   continue Robaxin 750 mg QID, Valium 5 mg TID, Flexeril prn.      ID  #Anti-infectives.  -Continue PTA acyclovir and fluconazole (per pt request).  -Held Levaquin ppx as patient is not neutropenic. Resumed on discharge or when ANC <1000.      CV  #Lymphedema of lower extremities.  -On HCTZ and lasix PTA. Doses held on admission d/t possible JOSH with chemo but could consider restarting if creat remains stable/wnl.       PULM  #Asthma, allergies.   -Continue PTA singular, zyrtec.  -Patient stated she is not taking her inhalers.      FEN  -IVF per chemo regimen.  -PTA electrolyte replacements and lyte protocol.  -RDAT.      PPx  -VTE: mechanical, ambulates  -PUD: PTA ranitidine  -Bowels: PTA docusate, miralax, added fiber per pt request    Code status: FULL  Disposition: Anticipate d/c on Sunday 12/31 with f/u for Neulasta and labs on Tuesday 1/2.     Kera Summers, APRN, CNP  Hematology/Oncology  Pager:  [phone]    Interval History  : Pt is doing ok this morning except for worsening fatigue with each chemo cycle. Denies nausea or vomiting. Denies issues with constipation. Denies HA, dizziness, chest pain/pressure, cough, sinus pain, fevers, chills, abd pain, vomiting, diarrhea. Chronic chest/breast pain is controlled on current regimen. Notes mild b/l leg swelling. On Lasix and HCTZ. Pain is controlled on current pain regimen. No other immediate concerns. Would really like to be home in time for new years.       Physical Exam   Temp: 96.9  F (36.1  C) Temp src: Oral BP: 104/61 Pulse: 91 Heart Rate: 103 Resp: 18 SpO2: 96 % O2 Device: None (Room air)    Vitals:    12/28/17 0811 12/29/17 0845 12/30/17 0758   Weight: 84.9 kg (187 lb 3.2 oz) 87.5 kg (193 lb) 88.8 kg (195 lb 11.2 oz)     Vital Signs with Ranges  Temp:  [96.5  F (35.8  C)-97.2  F (36.2  C)] 96.9  F (36.1  C)  Pulse:  [87-91] 91  Heart Rate:  [] 103  Resp:  [16-20] 18  BP: (104-121)/(56-66) 104/61  SpO2:  [95 %-97 %] 96 %  I/O last 3 completed shifts:  In: 1368 [P.O.:820; IV Piggyback:548]  Out: 2225 [Urine:2225]    Constitutional: A & O x 3 pleasant female seen laying in bed. Alert and interactive. Flat affect.   HEENT: NCAT. PERRL, EOMI, anicteric sclera. MMM, no lesions or thrush.   Respiratory: Non-labored breathing on RA, good air exchange, lungs clear to auscultation bilaterally. No cough or wheeze noted.   Cardiovascular: Regular rate and rhythm. No murmur or rub. No edema or cyanosis.   GI: Normoactive bowel sounds. Abdomen soft, non-distended, mildly TTP in epigastric region.  Genitourinary: Deferred.   Skin: Warm and dry. No concerning lesions or rash on exposed surfaces.  Musculoskeletal: Extremities grossly normal, non-tender, trace non-pitting BLE/pedal edema. Strong peripheral pulses. Good strength and ROM.  Neurologic: A&O, speech normal, no focal deficits.   Neuropsychiatric: Mentation and affect appear  normal/appropriate.  Vascular Access: Chest port site is CDI without erythema, swelling, or discharge. +Tender.     Medications   Current Facility-Administered Medications   Medication     psyllium (METAMUCIL/KONSYL) Packet 1 packet     furosemide (LASIX) tablet 20 mg     hydrochlorothiazide (MICROZIDE) capsule 12.5 mg     acyclovir (ZOVIRAX) tablet 400 mg     allopurinol (ZYLOPRIM) tablet 300 mg     bisacodyl (DULCOLAX) EC tablet 15 mg     calcitRIOL (ROCALTROL) capsule 0.5 mcg     celecoxib (celeBREX) capsule 200 mg     cromolyn (OPTICROM) 4 % ophthalmic solution 1 drop     cyclobenzaprine (FLEXERIL) tablet 10 mg     diclofenac (VOLTAREN) 1 % topical gel 2 g     lidocaine (XYLOCAINE) 5 % ointment     methocarbamol (ROBAXIN) tablet 750 mg     montelukast (SINGULAIR) tablet 20 mg     oxyCODONE IR (ROXICODONE) tablet 30 mg     polyethylene glycol (MIRALAX/GLYCOLAX) Packet 17 g     ranitidine (ZANTAC) tablet 75 mg     senna-docusate (SENOKOT-S;PERICOLACE) 8.6-50 MG per tablet 1-2 tablet     Medication Instruction     lidocaine-prilocaine (EMLA) cream     naloxone (NARCAN) injection 0.1-0.4 mg     [START ON 1/1/2018] fosaprepitant (EMEND) 150 mg in NaCl 0.9 % intermittent infusion     [START ON 1/2/2018] dexamethasone (DECADRON) tablet 8 mg     predniSONE (DELTASONE) tablet 60 mg     Chemotherapy Infusing-Continuous Infusion     etoposide (TOPOSAR) 120 mg in NaCl 0.9 % 556 mL CHEMOTHERAPY     vinCRIStine (ONCOVIN) 0.8 mg, DOXOrubicin (ADRIAMYCIN) 24 mg in NaCl 0.9 % 1,063 mL CHEMOTHERAPY     [START ON 1/1/2018] cyclophosphamide (CYTOXAN) 1,810 mg in NaCl 0.9 % 641 mL CHEMOTHERAPY     [START ON 1/2/2018] filgrastim 15 mcg/mL (in Dextrose) (NEUPOGEN) infusion 400 mcg     senna-docusate (SENOKOT-S;PERICOLACE) 8.6-50 MG per tablet 2 tablet     prochlorperazine (COMPAZINE) tablet 10 mg     prochlorperazine (COMPAZINE) injection 10 mg     LORazepam (ATIVAN) tablet 0.5-1 mg     LORazepam (ATIVAN) injection 0.5-1 mg      MEDICATION INSTRUCTION     methylPREDNISolone sodium succinate (solu-MEDROL) injection 125 mg     diphenhydrAMINE (BENADRYL) injection 50 mg     meperidine (DEMEROL) injection 25 mg     EPINEPHrine PF (ADRENALIN) injection 0.3 mg     albuterol (PROAIR HFA/PROVENTIL HFA/VENTOLIN HFA) Inhaler 1-2 puff     albuterol neb solution 2.5 mg     0.9% sodium chloride infusion     cetirizine (zyrTEC) tablet 10 mg     morphine (MS CONTIN) 12 hr tablet 30 mg     morphine (MS CONTIN) 12 hr tablet 60 mg     sodium chloride (PF) 0.9% PF flush 10-20 mL     heparin lock flush 10 UNIT/ML injection 5-10 mL     heparin lock flush 10 UNIT/ML injection 5-10 mL     heparin 100 UNIT/ML injection 5 mL     sodium chloride (PF) 0.9% PF flush 10-20 mL     diazepam (VALIUM) tablet 5 mg     calcitRIOL (ROCALTROL) capsule 0.5 mcg     calcitRIOL (ROCALTROL) capsule 0.25 mcg     cholecalciferol (vitamin D3) tablet 5,000 Units     calcium citrate-vitamin D (CITRACAL) 315-250 MG-UNIT per tablet 2 tablet     levothyroxine (SYNTHROID/LEVOTHROID) tablet 224 mcg     [START ON 12/31/2017] levothyroxine (SYNTHROID/LEVOTHROID) tablet 336 mcg     fluconazole (DIFLUCAN) tablet 200 mg     ondansetron (ZOFRAN) tablet 8 mg     Facility-Administered Medications Ordered in Other Encounters   Medication     heparin 100 UNIT/ML injection 5 mL     sodium chloride (PF) 0.9% PF flush 20 mL       Data   CBC    Recent Labs  Lab 12/30/17  0718 12/29/17  0625 12/28/17  1224 12/27/17  1631   WBC 10.6 9.8 9.1 8.7   RBC 2.82* 3.00* 3.18* 3.45*   HGB 8.8* 9.5* 9.9* 11.0*   HCT 28.5* 30.1* 31.8* 34.7*   * 100 100 101*   MCH 31.2 31.7 31.1 31.9   MCHC 30.9* 31.6 31.1* 31.7   RDW 22.2* 22.3* 22.2* 22.5*    277 279 305     CMP    Recent Labs  Lab 12/30/17  0718 12/29/17  0625 12/28/17  1224 12/27/17  1631 12/26/17  1402    141 141 139 142   POTASSIUM 3.7 3.9 3.7 3.6 3.8   CHLORIDE 107 106 103 100 103   CO2 27 28 33* 32 30   ANIONGAP 8 7 5 6 9   * 138*  119* 108* 138*   BUN 19 16 18 17 17   CR 0.68 0.76 0.96 0.98 1.08*   GFRESTIMATED 89 78 59* 59* 52*   GFRESTBLACK >90 >90 72 71 63   ELMO 8.0* 8.3* 8.5 8.6 8.2*   MAG  --   --  1.8  --   --    PHOS  --   --  4.8*  --   --    PROTTOTAL  --   --  6.0* 6.9 6.5*   ALBUMIN  --   --  3.0* 3.5 3.2*   BILITOTAL  --   --  0.3 0.3 0.4   ALKPHOS  --   --  75 93 87   AST  --   --  18 26 25   ALT  --   --  29 31 28     INR    Recent Labs  Lab 12/28/17  1224   INR 0.99       Results for orders placed or performed during the hospital encounter of 12/28/17   XR Lumbar Punc Intrathecal Chemo Admin    Narrative    Lumbar Puncture using Fluoroscopy    History: Intrathecal chemotherapy    Procedure note: Verbal and written consent for lumbar puncture was  obtained from the patient, and benefits and risk of the procedure were  explained, including but not limited to worsening headache,  hemorrhage, infection, lower extremity pain, or nerve root injury. The  patient was sterilely prepped and draped with the patient in the  prone/decubitus position, over the lower back. Under fluoroscopic  guidance, the interlaminar spaces were noted. 1% lidocaine was  administered for local anesthetic over the L2-3 interlaminar space,  and a 22 gauge 3.5 inch needle was advanced into the thecal sac under  fluoroscopic guidance.  There was initial show of clear CSF.  Approximately 10 cc of CSF were collected.    The needle was removed with the stylet in place. There was no  immediate complication associated with the procedure. Samples were  sent for the requested laboratory testing. Intrathecal chemotherapy  was administered by the oncologist.     Estimated blood loss: Less than 1 cc.    Fluoroscopic time: 24 seconds      Impression    Impression: Successful lumbar puncture without immediate complication.    I, JORGE L LEWIS MD, attest that I was present for all critical  portions of the procedure and was immediately available to provide  guidance and  assistance during the remainder of the procedure.    I have personally reviewed the examination and initial interpretation  and I agree with the findings.    JORGE L LEWIS MD     *Note: Due to a large number of results and/or encounters for the requested time period, some results have not been displayed. A complete set of results can be found in Results Review.

## 2017-12-30 NOTE — PROGRESS NOTES
Elvin was trying to get her food from the cart outside her room and she was pushing her IV pole and the door swung and hit her and knocked her down. She said she fell on her butt and did not head her head or anything else. It was an unwitnessed fall. Elvin was alert and oriented at the time of the fall and was assisted up with assistance of one staff. Suma Summers N.P. Notified. Reviewed Fall's precautions with Elvin and ordered Fall's precautions.

## 2017-12-30 NOTE — PLAN OF CARE
Problem: Chemotherapy Effects (Adult)  Goal: Signs and Symptoms of Listed Potential Problems Will be Absent, Minimized or Managed (Chemotherapy Effects)  Signs and symptoms of listed potential problems will be absent, minimized or managed by discharge/transition of care (reference Chemotherapy Effects (Adult) CPG).   Outcome: No Change  Day 3 cycle 5 DA-R-Epoch. R port patent with CIVI etoposide and Vincristine/dox running. Pt frustrated that rate was not increased by MD today and that chemo will not finish on Sunday for insurance purposes. Contact precautions maintained for MRSA. Oxycodone and scheduled meds q4h for chest pain r/t mastectomy. HCTZ ordered for fluid overload. Compazine X1 for nausea. Formed BM X3 today.

## 2017-12-30 NOTE — PLAN OF CARE
Problem: Patient Care Overview  Goal: Plan of Care/Patient Progress Review  Outcome: No Change  Assumed cares at 1900 to 2300. A&Ox4, VSS on RA. CIVI etoposide & vincristine/dox infusing into right port. C/o chest pain - PRN oxy given with relief, scheduled Voltaren gel given as well. Denies SOB & nausea. Contact precautions maintained for MRSA. Tolerating regular diet & PO fluids. Voiding, not saving, no BM on this shift. Continue to monitor & follow POC.

## 2017-12-30 NOTE — PROGRESS NOTES
Nursing Focus: Chemotherapy    D: Positive blood return via port. Insertion site is clean/dry/intact, dressing intact with no complaints of pain.  Urine output is recorded in intake in Doc Flowsheet.      I: no premedications ordered. Dose #3 of Etoposide and Vincristine/Doxorubicin started to infuse over 24 hours. Reviewed pt teaching on chemotherapy side effects.  Pt denies need for further teaching. Chemotherapy double checked per protocol by two chemotherapy competent RN's.     A: Tolerating procedure well. Denies nausea and or pain.     P: Continue to monitor urine output and symptoms of nausea. Screen for symptoms of toxicity.

## 2017-12-31 LAB
ANION GAP SERPL CALCULATED.3IONS-SCNC: 9 MMOL/L (ref 3–14)
BASOPHILS # BLD AUTO: 0 10E9/L (ref 0–0.2)
BASOPHILS NFR BLD AUTO: 0.1 %
BUN SERPL-MCNC: 26 MG/DL (ref 7–30)
CALCIUM SERPL-MCNC: 8.4 MG/DL (ref 8.5–10.1)
CHLORIDE SERPL-SCNC: 103 MMOL/L (ref 94–109)
CO2 SERPL-SCNC: 28 MMOL/L (ref 20–32)
CREAT SERPL-MCNC: 0.84 MG/DL (ref 0.52–1.04)
DIFFERENTIAL METHOD BLD: ABNORMAL
EOSINOPHIL # BLD AUTO: 0 10E9/L (ref 0–0.7)
EOSINOPHIL NFR BLD AUTO: 0 %
ERYTHROCYTE [DISTWIDTH] IN BLOOD BY AUTOMATED COUNT: 21.8 % (ref 10–15)
GFR SERPL CREATININE-BSD FRML MDRD: 69 ML/MIN/1.7M2
GLUCOSE SERPL-MCNC: 126 MG/DL (ref 70–99)
HCT VFR BLD AUTO: 29.9 % (ref 35–47)
HGB BLD-MCNC: 9.4 G/DL (ref 11.7–15.7)
IMM GRANULOCYTES # BLD: 0 10E9/L (ref 0–0.4)
IMM GRANULOCYTES NFR BLD: 0.2 %
LYMPHOCYTES # BLD AUTO: 0.2 10E9/L (ref 0.8–5.3)
LYMPHOCYTES NFR BLD AUTO: 2.5 %
MCH RBC QN AUTO: 32.1 PG (ref 26.5–33)
MCHC RBC AUTO-ENTMCNC: 31.4 G/DL (ref 31.5–36.5)
MCV RBC AUTO: 102 FL (ref 78–100)
MONOCYTES # BLD AUTO: 0.2 10E9/L (ref 0–1.3)
MONOCYTES NFR BLD AUTO: 2.3 %
NEUTROPHILS # BLD AUTO: 7.9 10E9/L (ref 1.6–8.3)
NEUTROPHILS NFR BLD AUTO: 94.9 %
NRBC # BLD AUTO: 0 10*3/UL
NRBC BLD AUTO-RTO: 0 /100
PLATELET # BLD AUTO: 282 10E9/L (ref 150–450)
POTASSIUM SERPL-SCNC: 3.8 MMOL/L (ref 3.4–5.3)
RBC # BLD AUTO: 2.93 10E12/L (ref 3.8–5.2)
SODIUM SERPL-SCNC: 140 MMOL/L (ref 133–144)
WBC # BLD AUTO: 8.4 10E9/L (ref 4–11)

## 2017-12-31 PROCEDURE — 85025 COMPLETE CBC W/AUTO DIFF WBC: CPT

## 2017-12-31 PROCEDURE — 12000008 ZZH R&B INTERMEDIATE UMMC

## 2017-12-31 PROCEDURE — 36592 COLLECT BLOOD FROM PICC: CPT

## 2017-12-31 PROCEDURE — 25000131 ZZH RX MED GY IP 250 OP 636 PS 637

## 2017-12-31 PROCEDURE — 80048 BASIC METABOLIC PNL TOTAL CA: CPT

## 2017-12-31 PROCEDURE — 25000125 ZZHC RX 250

## 2017-12-31 PROCEDURE — 25000128 H RX IP 250 OP 636

## 2017-12-31 PROCEDURE — 25000132 ZZH RX MED GY IP 250 OP 250 PS 637

## 2017-12-31 PROCEDURE — 25000132 ZZH RX MED GY IP 250 OP 250 PS 637: Performed by: NURSE PRACTITIONER

## 2017-12-31 PROCEDURE — 25000125 ZZHC RX 250: Performed by: NURSE PRACTITIONER

## 2017-12-31 PROCEDURE — 25000132 ZZH RX MED GY IP 250 OP 250 PS 637: Performed by: DERMATOLOGY

## 2017-12-31 PROCEDURE — 99232 SBSQ HOSP IP/OBS MODERATE 35: CPT | Performed by: INTERNAL MEDICINE

## 2017-12-31 RX ORDER — ACETAMINOPHEN 325 MG/1
650 TABLET ORAL EVERY 4 HOURS PRN
Status: DISCONTINUED | OUTPATIENT
Start: 2017-12-31 | End: 2018-01-01 | Stop reason: HOSPADM

## 2017-12-31 RX ADMIN — ONDANSETRON HYDROCHLORIDE 8 MG: 8 TABLET, FILM COATED ORAL at 19:59

## 2017-12-31 RX ADMIN — VITAMIN D, TAB 1000IU (100/BT) 5000 UNITS: 25 TAB at 20:00

## 2017-12-31 RX ADMIN — FUROSEMIDE 20 MG: 20 TABLET ORAL at 16:06

## 2017-12-31 RX ADMIN — ACETAMINOPHEN 650 MG: 325 TABLET, FILM COATED ORAL at 04:40

## 2017-12-31 RX ADMIN — CETIRIZINE HYDROCHLORIDE 10 MG: 10 TABLET, FILM COATED ORAL at 12:19

## 2017-12-31 RX ADMIN — HYDROCHLOROTHIAZIDE 12.5 MG: 12.5 CAPSULE ORAL at 08:09

## 2017-12-31 RX ADMIN — METHOCARBAMOL 750 MG: 750 TABLET ORAL at 16:05

## 2017-12-31 RX ADMIN — MONTELUKAST SODIUM 20 MG: 10 TABLET, FILM COATED ORAL at 06:04

## 2017-12-31 RX ADMIN — OXYCODONE HYDROCHLORIDE 30 MG: 30 TABLET ORAL at 12:19

## 2017-12-31 RX ADMIN — CROMOLYN SODIUM 1 DROP: 40 SOLUTION/ DROPS OPHTHALMIC at 16:05

## 2017-12-31 RX ADMIN — DICLOFENAC SODIUM 2 G: 10 GEL TOPICAL at 20:00

## 2017-12-31 RX ADMIN — CALCITRIOL 0.5 MCG: 0.5 CAPSULE, LIQUID FILLED ORAL at 06:09

## 2017-12-31 RX ADMIN — OXYCODONE HYDROCHLORIDE 30 MG: 30 TABLET ORAL at 01:19

## 2017-12-31 RX ADMIN — DIAZEPAM 5 MG: 5 TABLET ORAL at 12:18

## 2017-12-31 RX ADMIN — CYCLOBENZAPRINE HYDROCHLORIDE 10 MG: 10 TABLET, FILM COATED ORAL at 19:59

## 2017-12-31 RX ADMIN — Medication 2 TABLET: at 06:03

## 2017-12-31 RX ADMIN — CROMOLYN SODIUM 1 DROP: 40 SOLUTION/ DROPS OPHTHALMIC at 08:08

## 2017-12-31 RX ADMIN — RANITIDINE 75 MG: 75 TABLET, FILM COATED ORAL at 06:04

## 2017-12-31 RX ADMIN — LEVOTHYROXINE SODIUM 336 MCG: 112 TABLET ORAL at 06:03

## 2017-12-31 RX ADMIN — CELECOXIB 200 MG: 200 CAPSULE ORAL at 20:00

## 2017-12-31 RX ADMIN — ACYCLOVIR 400 MG: 400 TABLET ORAL at 06:05

## 2017-12-31 RX ADMIN — ONDANSETRON HYDROCHLORIDE 8 MG: 8 TABLET, FILM COATED ORAL at 12:18

## 2017-12-31 RX ADMIN — CALCITRIOL 0.25 MCG: 0.25 CAPSULE, LIQUID FILLED ORAL at 20:00

## 2017-12-31 RX ADMIN — ETOPOSIDE 120 MG: 20 INJECTION, SOLUTION, CONCENTRATE INTRAVENOUS at 11:33

## 2017-12-31 RX ADMIN — VINCRISTINE SULFATE 0.8 MG: 1 INJECTION, SOLUTION INTRAVENOUS at 11:30

## 2017-12-31 RX ADMIN — Medication 2 TABLET: at 12:18

## 2017-12-31 RX ADMIN — DICLOFENAC SODIUM 2 G: 10 GEL TOPICAL at 16:05

## 2017-12-31 RX ADMIN — MONTELUKAST SODIUM 20 MG: 10 TABLET, FILM COATED ORAL at 19:59

## 2017-12-31 RX ADMIN — OXYCODONE HYDROCHLORIDE 30 MG: 30 TABLET ORAL at 19:59

## 2017-12-31 RX ADMIN — RANITIDINE 75 MG: 75 TABLET, FILM COATED ORAL at 12:18

## 2017-12-31 RX ADMIN — Medication 2 TABLET: at 20:00

## 2017-12-31 RX ADMIN — CROMOLYN SODIUM 1 DROP: 40 SOLUTION/ DROPS OPHTHALMIC at 20:00

## 2017-12-31 RX ADMIN — ACYCLOVIR 400 MG: 400 TABLET ORAL at 19:59

## 2017-12-31 RX ADMIN — METHOCARBAMOL 750 MG: 750 TABLET ORAL at 06:04

## 2017-12-31 RX ADMIN — CELECOXIB 200 MG: 200 CAPSULE ORAL at 06:11

## 2017-12-31 RX ADMIN — DIAZEPAM 5 MG: 5 TABLET ORAL at 04:23

## 2017-12-31 RX ADMIN — PREDNISONE 60 MG: 50 TABLET ORAL at 08:09

## 2017-12-31 RX ADMIN — MORPHINE SULFATE 60 MG: 60 TABLET, EXTENDED RELEASE ORAL at 19:59

## 2017-12-31 RX ADMIN — MORPHINE SULFATE 30 MG: 30 TABLET, EXTENDED RELEASE ORAL at 06:04

## 2017-12-31 RX ADMIN — RANITIDINE 75 MG: 75 TABLET, FILM COATED ORAL at 20:00

## 2017-12-31 RX ADMIN — SENNOSIDES AND DOCUSATE SODIUM 2 TABLET: 8.6; 5 TABLET ORAL at 06:04

## 2017-12-31 RX ADMIN — CETIRIZINE HYDROCHLORIDE 10 MG: 10 TABLET, FILM COATED ORAL at 20:00

## 2017-12-31 RX ADMIN — DICLOFENAC SODIUM 2 G: 10 GEL TOPICAL at 08:08

## 2017-12-31 RX ADMIN — POLYETHYLENE GLYCOL 3350 17 G: 17 POWDER, FOR SOLUTION ORAL at 19:59

## 2017-12-31 RX ADMIN — METHOCARBAMOL 750 MG: 750 TABLET ORAL at 19:59

## 2017-12-31 RX ADMIN — FLUCONAZOLE 200 MG: 200 TABLET ORAL at 12:18

## 2017-12-31 RX ADMIN — OXYCODONE HYDROCHLORIDE 30 MG: 30 TABLET ORAL at 16:06

## 2017-12-31 RX ADMIN — CETIRIZINE HYDROCHLORIDE 10 MG: 10 TABLET, FILM COATED ORAL at 06:05

## 2017-12-31 RX ADMIN — METHOCARBAMOL 750 MG: 750 TABLET ORAL at 12:18

## 2017-12-31 RX ADMIN — ONDANSETRON HYDROCHLORIDE 8 MG: 8 TABLET, FILM COATED ORAL at 04:23

## 2017-12-31 RX ADMIN — FUROSEMIDE 20 MG: 20 TABLET ORAL at 08:09

## 2017-12-31 RX ADMIN — ALLOPURINOL 300 MG: 300 TABLET ORAL at 12:18

## 2017-12-31 RX ADMIN — OXYCODONE HYDROCHLORIDE 30 MG: 30 TABLET ORAL at 05:17

## 2017-12-31 RX ADMIN — CROMOLYN SODIUM 1 DROP: 40 SOLUTION/ DROPS OPHTHALMIC at 12:18

## 2017-12-31 RX ADMIN — PREDNISONE 60 MG: 50 TABLET ORAL at 20:00

## 2017-12-31 RX ADMIN — DIAZEPAM 5 MG: 5 TABLET ORAL at 19:59

## 2017-12-31 RX ADMIN — CALCITRIOL 0.5 MCG: 0.5 CAPSULE, LIQUID FILLED ORAL at 08:09

## 2017-12-31 RX ADMIN — MORPHINE SULFATE 30 MG: 30 TABLET, EXTENDED RELEASE ORAL at 12:18

## 2017-12-31 ASSESSMENT — PAIN DESCRIPTION - DESCRIPTORS
DESCRIPTORS: ACHING
DESCRIPTORS: ACHING
DESCRIPTORS: ACHING;CONSTANT
DESCRIPTORS: ACHING;CONSTANT
DESCRIPTORS: TENDER;THROBBING
DESCRIPTORS: ACHING

## 2017-12-31 NOTE — PLAN OF CARE
Problem: Chemotherapy Effects (Adult)  Goal: Signs and Symptoms of Listed Potential Problems Will be Absent, Minimized or Managed (Chemotherapy Effects)  Signs and symptoms of listed potential problems will be absent, minimized or managed by discharge/transition of care (reference Chemotherapy Effects (Adult) CPG).   Outcome: No Change    VSS, afebrile. CIVI chemo running with brisk blood return from port Q4H. C/O nausea, managed with scheduled Zofran and PRN Compazine (x1). Pain managed with Voltaren gel Q4H, oxycodone 30 mg and long acting MS Contin. 10 mg bisacodyl at HS. Up independently, steady on feet. Fair appetite. Adequate UOP. Will continue to monitor.

## 2017-12-31 NOTE — PLAN OF CARE
Problem: Chemotherapy Effects (Adult)  Goal: Signs and Symptoms of Listed Potential Problems Will be Absent, Minimized or Managed (Chemotherapy Effects)  Signs and symptoms of listed potential problems will be absent, minimized or managed by discharge/transition of care (reference Chemotherapy Effects (Adult) CPG).   Outcome: No Change  Afebrile. Denies nausea. Oxycodone for chest pain with some relief. Had a small stool today. Up independently. Chemotherapy infusing without problems. Good blood return from her Port.

## 2017-12-31 NOTE — PROGRESS NOTES
"[facility], [place]    Hematology / Oncology Progress Note    Date of Admission: 12/28/2017  Hospital Day #: 4      Assessment & Plan   Tisha Arias is a 57 year old woman with PMHx of breast cancer s/p bilateral mastectomies and BENJIE/BSO, papillary thyroid cancer s/p total thyroidectomy, chronic chest wall pain, muscle spasms, asthma, h/o RNY gastric bypass, and high grade DLBCL. She is admitted for C5 DA-R-EPOCH for DLBCL.     HEME/ONC  # High grade DLBCL: \"Double hit-like.\" Primary is Dr. Sauceda. Patient diagnosed August 2017. Lytic lesion of right 10th rib with extension. Disease localized above the diaphragm in thoracic and paravertebral soft tissue and mediastinal lymphadenopathy. 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 Staging LP and BMBx were negative for lymphoma. Initiated cycle 1 DA-R-EPOCH on 10/6/17. Has completed 4 cycles. She presents for cycle 5 DA-R-EPOCH with no changes in dose r/t ANC 0.3 on outpatient labs. Had PET showing CR on 12/21/17.     Treatment plan: Cycle 5 DA-R-EPOCH (Day 1=12/28/17). Today is day 4.   -LP with IT chemotherapy (completed 12/28). CSF flow pending.   -Neulasta 24-72 hours after chemo. Needs to be scheduled.   -Discharge with D6-7 Dexamethasone.     #Anemia. 2/2 disease and chemotherapy.  -While inpatient, change transfusion parameters to keep Hgb >8, Plts >10K.   -At hospital discharge, continue to monitor with twice weekly labs and transfuse if Hgb <8 and Plt <10k.       #Stage 1, ER/SD-positive, HER2-negative breast cancer. Follows with Dr. Crawley. Diagnosed in 2011. Oncotype recurrence score of 11 (7% recurrence risk after 5 years of tamoxifen). S/p bilateral mastectomies and BENJIE/BSO in 2012. Was initially on Arimidex ([id number]) but changed to Tamoxifen due to arthralgias. Tamoxifen held since 10/5 " and continue to hold with chemotherapy. Last f/u Dr. Crawley was on 11/27/17.      # Papillary thyroid cancer:  - Follows with Dr. Castro. Diagnosed in 2013. S/p total thyroidectomy and CND. Received ETOH treatment August 2014, October 2014, and December 2014. Has post-surgical hypothyroidism.  -Continue PTA Levothyroxine.  -Continue PTA Calcitriol.      GI  # H/o Rachael en Y gastric bypass:   -Likely affecting absorption; thus patient is on multiple supplements.  -Continue supplements (calcium citrate-vitamin D, vitamin D3, magnesium citrate). Also has iron deficiency anemia likely from poor absorption and per Dr. Sauceda, she should continue 325mg PO ferrous sulfate TID (resume outpatient).       PAIN  # Chronic chest wall pain after mastectomy.   -Continue MS Contin 30/30/60 mg TID.  -Oxycodone 15-30 mg q4hrs prn.  -Celebrex 200 BID.   -Voltaren gel QID, lidocaine cream prn.  -Palliative Care consulted for additional recs.  -Pain well managed on current regimen.   -Does not seem to be overly sedated on pain meds.       #Chronic muscle cramps.   -Continue Robaxin 750 mg QID, Valium 5 mg TID, Flexeril prn.      ID  #Anti-infectives.  -Continue PTA acyclovir and fluconazole (per pt request).  -Held Levaquin ppx as patient is not neutropenic. Resumed on discharge or when ANC <1000.      CV  #Lymphedema of lower extremities.  -Continue HCTZ and Lasix as per PTA. Doses held on admission d/t possible JOSH with chemo.  -Creatinine remains stable.      PULM  #Asthma, allergies.   -Continue PTA singular, zyrtec.  -Not currently taking her inhalers.      FEN  -IVF per chemo regimen.  -PTA electrolyte replacements and lyte protocol.  -RDAT.      PPx  -VTE: mechanical, ambulates  -PUD: PTA ranitidine  -Bowels: PTA docusate, miralax, added fiber per pt request    Code status: FULL  Disposition: Anticipate d/c on Monday morning with f/u for Neulasta and labs on Tuesday 1/2.     Kera Summers, DENZEL,  CNP  Hematology/Oncology  Pager: [phone]      Interval History  : Pt is doing ok this morning. Had an unwitnessed fall yesterday. Denies any pain or injury from the fall. continue to deny dizziness or lightheadedness prior to fall. Has been complaining of worsening fatigue with each chemo cycle. Denies nausea or vomiting. Denies issues with constipation. Denies HA, chest pain/pressure, cough, sinus pain, fevers, chills, abd pain, vomiting, diarrhea. Chronic chest/breast pain is controlled on current regimen. Notes mild b/l leg swelling. On Lasix and HCTZ. Pain is controlled on current pain regimen. No other immediate concerns. Not happy about spending new years nasreen in the hospital.        Physical Exam   Temp: 98.6  F (37  C) Temp src: Oral BP: 128/75   Heart Rate: 84 Resp: 16 SpO2: 93 % O2 Device: None (Room air)    Vitals:    12/29/17 0845 12/30/17 0758 12/31/17 0748   Weight: 87.5 kg (193 lb) 88.8 kg (195 lb 11.2 oz) 88.9 kg (195 lb 14.4 oz)     Vital Signs with Ranges  Temp:  [95.7  F (35.4  C)-98.6  F (37  C)] 98.6  F (37  C)  Heart Rate:  [] 84  Resp:  [16-18] 16  BP: (102-160)/(52-77) 128/75  SpO2:  [93 %-99 %] 93 %  I/O last 3 completed shifts:  In: 3702.6 [P.O.:2475; I.V.:50; IV Piggyback:1177.6]  Out: 3900 [Urine:3900]    Constitutional: A & O x 3 pleasant female seen sitting up in bed. Alert and interactive.   HEENT: NCAT. PERRL, EOMI, anicteric sclera. MMM, no lesions or thrush.   Respiratory: Non-labored breathing on RA, good air exchange, lungs clear to auscultation bilaterally. No cough or wheeze noted.   Cardiovascular: Regular rate and rhythm. No murmur or rub. Trace edema bilaterally; no cyanosis.   GI: Normoactive bowel sounds. Abdomen soft, non-distended, mildly TTP in epigastric region.  Genitourinary: Deferred.   Skin: Warm and dry. No concerning lesions or rash on exposed surfaces.  Musculoskeletal: Extremities grossly normal, non-tender, trace non-pitting BLE/pedal edema. Strong peripheral  pulses.   Good strength and ROM.  Neurologic: A&O, speech normal, no focal deficits.   Neuropsychiatric: Mentation and affect appear normal/appropriate.  Vascular Access: Chest port site is CDI without erythema, swelling, or discharge. +Tender.       Medications   Current Facility-Administered Medications   Medication     acetaminophen (TYLENOL) tablet 650 mg     psyllium (METAMUCIL/KONSYL) Packet 1 packet     furosemide (LASIX) tablet 20 mg     hydrochlorothiazide (MICROZIDE) capsule 12.5 mg     acyclovir (ZOVIRAX) tablet 400 mg     allopurinol (ZYLOPRIM) tablet 300 mg     bisacodyl (DULCOLAX) EC tablet 15 mg     celecoxib (celeBREX) capsule 200 mg     cromolyn (OPTICROM) 4 % ophthalmic solution 1 drop     cyclobenzaprine (FLEXERIL) tablet 10 mg     diclofenac (VOLTAREN) 1 % topical gel 2 g     lidocaine (XYLOCAINE) 5 % ointment     methocarbamol (ROBAXIN) tablet 750 mg     montelukast (SINGULAIR) tablet 20 mg     oxyCODONE IR (ROXICODONE) tablet 30 mg     polyethylene glycol (MIRALAX/GLYCOLAX) Packet 17 g     ranitidine (ZANTAC) tablet 75 mg     senna-docusate (SENOKOT-S;PERICOLACE) 8.6-50 MG per tablet 1-2 tablet     Medication Instruction     lidocaine-prilocaine (EMLA) cream     naloxone (NARCAN) injection 0.1-0.4 mg     [START ON 1/1/2018] fosaprepitant (EMEND) 150 mg in NaCl 0.9 % intermittent infusion     [START ON 1/2/2018] dexamethasone (DECADRON) tablet 8 mg     predniSONE (DELTASONE) tablet 60 mg     Chemotherapy Infusing-Continuous Infusion     etoposide (TOPOSAR) 120 mg in NaCl 0.9 % 556 mL CHEMOTHERAPY     vinCRIStine (ONCOVIN) 0.8 mg, DOXOrubicin (ADRIAMYCIN) 24 mg in NaCl 0.9 % 1,063 mL CHEMOTHERAPY     [START ON 1/1/2018] cyclophosphamide (CYTOXAN) 1,810 mg in NaCl 0.9 % 641 mL CHEMOTHERAPY     [START ON 1/2/2018] filgrastim 15 mcg/mL (in Dextrose) (NEUPOGEN) infusion 400 mcg     senna-docusate (SENOKOT-S;PERICOLACE) 8.6-50 MG per tablet 2 tablet     prochlorperazine (COMPAZINE) tablet 10 mg      prochlorperazine (COMPAZINE) injection 10 mg     LORazepam (ATIVAN) tablet 0.5-1 mg     LORazepam (ATIVAN) injection 0.5-1 mg     MEDICATION INSTRUCTION     methylPREDNISolone sodium succinate (solu-MEDROL) injection 125 mg     diphenhydrAMINE (BENADRYL) injection 50 mg     meperidine (DEMEROL) injection 25 mg     EPINEPHrine PF (ADRENALIN) injection 0.3 mg     albuterol (PROAIR HFA/PROVENTIL HFA/VENTOLIN HFA) Inhaler 1-2 puff     albuterol neb solution 2.5 mg     0.9% sodium chloride infusion     cetirizine (zyrTEC) tablet 10 mg     morphine (MS CONTIN) 12 hr tablet 30 mg     morphine (MS CONTIN) 12 hr tablet 60 mg     sodium chloride (PF) 0.9% PF flush 10-20 mL     heparin lock flush 10 UNIT/ML injection 5-10 mL     heparin lock flush 10 UNIT/ML injection 5-10 mL     heparin 100 UNIT/ML injection 5 mL     sodium chloride (PF) 0.9% PF flush 10-20 mL     diazepam (VALIUM) tablet 5 mg     calcitRIOL (ROCALTROL) capsule 0.5 mcg     calcitRIOL (ROCALTROL) capsule 0.25 mcg     cholecalciferol (vitamin D3) tablet 5,000 Units     calcium citrate-vitamin D (CITRACAL) 315-250 MG-UNIT per tablet 2 tablet     levothyroxine (SYNTHROID/LEVOTHROID) tablet 224 mcg     levothyroxine (SYNTHROID/LEVOTHROID) tablet 336 mcg     fluconazole (DIFLUCAN) tablet 200 mg     ondansetron (ZOFRAN) tablet 8 mg     Facility-Administered Medications Ordered in Other Encounters   Medication     heparin 100 UNIT/ML injection 5 mL     sodium chloride (PF) 0.9% PF flush 20 mL       Data   CBC    Recent Labs  Lab 12/31/17  0632 12/30/17  0718 12/29/17  0625 12/28/17  1224   WBC 8.4 10.6 9.8 9.1   RBC 2.93* 2.82* 3.00* 3.18*   HGB 9.4* 8.8* 9.5* 9.9*   HCT 29.9* 28.5* 30.1* 31.8*   * 101* 100 100   MCH 32.1 31.2 31.7 31.1   MCHC 31.4* 30.9* 31.6 31.1*   RDW 21.8* 22.2* 22.3* 22.2*    271 277 279     CMP    Recent Labs  Lab 12/31/17  0632 12/30/17  0718 12/29/17  0625 12/28/17  1224 12/27/17  1631 12/26/17  1402    142 141 141 139  142   POTASSIUM 3.8 3.7 3.9 3.7 3.6 3.8   CHLORIDE 103 107 106 103 100 103   CO2 28 27 28 33* 32 30   ANIONGAP 9 8 7 5 6 9   * 161* 138* 119* 108* 138*   BUN 26 19 16 18 17 17   CR 0.84 0.68 0.76 0.96 0.98 1.08*   GFRESTIMATED 69 89 78 59* 59* 52*   GFRESTBLACK 84 >90 >90 72 71 63   ELMO 8.4* 8.0* 8.3* 8.5 8.6 8.2*   MAG  --   --   --  1.8  --   --    PHOS  --   --   --  4.8*  --   --    PROTTOTAL  --   --   --  6.0* 6.9 6.5*   ALBUMIN  --   --   --  3.0* 3.5 3.2*   BILITOTAL  --   --   --  0.3 0.3 0.4   ALKPHOS  --   --   --  75 93 87   AST  --   --   --  18 26 25   ALT  --   --   --  29 31 28     INR    Recent Labs  Lab 12/28/17  1224   INR 0.99       Results for orders placed or performed during the hospital encounter of 12/28/17   XR Lumbar Punc Intrathecal Chemo Admin    Narrative    Lumbar Puncture using Fluoroscopy    History: Intrathecal chemotherapy    Procedure note: Verbal and written consent for lumbar puncture was  obtained from the patient, and benefits and risk of the procedure were  explained, including but not limited to worsening headache,  hemorrhage, infection, lower extremity pain, or nerve root injury. The  patient was sterilely prepped and draped with the patient in the  prone/decubitus position, over the lower back. Under fluoroscopic  guidance, the interlaminar spaces were noted. 1% lidocaine was  administered for local anesthetic over the L2-3 interlaminar space,  and a 22 gauge 3.5 inch needle was advanced into the thecal sac under  fluoroscopic guidance.  There was initial show of clear CSF.  Approximately 10 cc of CSF were collected.    The needle was removed with the stylet in place. There was no  immediate complication associated with the procedure. Samples were  sent for the requested laboratory testing. Intrathecal chemotherapy  was administered by the oncologist.     Estimated blood loss: Less than 1 cc.    Fluoroscopic time: 24 seconds      Impression    Impression:  Successful lumbar puncture without immediate complication.    I, JORGE L LEWIS MD, attest that I was present for all critical  portions of the procedure and was immediately available to provide  guidance and assistance during the remainder of the procedure.    I have personally reviewed the examination and initial interpretation  and I agree with the findings.    JORGE L LEWIS MD     *Note: Due to a large number of results and/or encounters for the requested time period, some results have not been displayed. A complete set of results can be found in Results Review.

## 2017-12-31 NOTE — PLAN OF CARE
Problem: Patient Care Overview  Goal: Plan of Care/Patient Progress Review  Outcome: No Change  Patient complained of headache  And has history of migraines. Called moonlighter for tylenol order and rthat was given but did not help h/a. Gave oxyodone which made h/a bearable. Denied nausea. Positive blood return q 4 hours,Patient writes ins/outs on greaseboard. On day # 3 civi etoposide / vincristine/ doxorubicin through port

## 2017-12-31 NOTE — PROGRESS NOTES
"Late Entry:     Notified at ~ 1430 on 12/30/17 that the pt had an unwitnessed fall as she was walking back to her room after removing her lunch tray out of the room. Pt was evaluated by myself. She tells me that she did not hit her head as she \"ducked\" to prevent this from happening. She landed on her buttocks. Denied dizziness or lightheadedness. Door apparently closed unexpectedly as she was entering the room. This knocked her off balance. Her only complaint was that of pain at her port site. Site was without evidence of swelling or bleeding. Chemo/fluids was infusing without difficulties.   Pt remained A & O x 3. Instructed to notify RN of any other changes as the evening progressed. Pt verbalized understanding.     Kera Summers NP    "

## 2018-01-01 VITALS
HEART RATE: 79 BPM | DIASTOLIC BLOOD PRESSURE: 64 MMHG | WEIGHT: 193 LBS | RESPIRATION RATE: 18 BRPM | SYSTOLIC BLOOD PRESSURE: 120 MMHG | OXYGEN SATURATION: 94 % | TEMPERATURE: 98.1 F | BODY MASS INDEX: 32.15 KG/M2 | HEIGHT: 65 IN

## 2018-01-01 LAB
ANION GAP SERPL CALCULATED.3IONS-SCNC: 6 MMOL/L (ref 3–14)
BASOPHILS # BLD AUTO: 0 10E9/L (ref 0–0.2)
BASOPHILS NFR BLD AUTO: 0 %
BUN SERPL-MCNC: 26 MG/DL (ref 7–30)
CALCIUM SERPL-MCNC: 7.8 MG/DL (ref 8.5–10.1)
CHLORIDE SERPL-SCNC: 105 MMOL/L (ref 94–109)
CO2 SERPL-SCNC: 32 MMOL/L (ref 20–32)
CREAT SERPL-MCNC: 0.72 MG/DL (ref 0.52–1.04)
DIFFERENTIAL METHOD BLD: ABNORMAL
EOSINOPHIL # BLD AUTO: 0 10E9/L (ref 0–0.7)
EOSINOPHIL NFR BLD AUTO: 0 %
ERYTHROCYTE [DISTWIDTH] IN BLOOD BY AUTOMATED COUNT: 20.4 % (ref 10–15)
GFR SERPL CREATININE-BSD FRML MDRD: 84 ML/MIN/1.7M2
GLUCOSE SERPL-MCNC: 115 MG/DL (ref 70–99)
HCT VFR BLD AUTO: 27.5 % (ref 35–47)
HGB BLD-MCNC: 9 G/DL (ref 11.7–15.7)
IMM GRANULOCYTES # BLD: 0 10E9/L (ref 0–0.4)
IMM GRANULOCYTES NFR BLD: 0.2 %
LYMPHOCYTES # BLD AUTO: 0.3 10E9/L (ref 0.8–5.3)
LYMPHOCYTES NFR BLD AUTO: 5 %
MCH RBC QN AUTO: 32.3 PG (ref 26.5–33)
MCHC RBC AUTO-ENTMCNC: 32.7 G/DL (ref 31.5–36.5)
MCV RBC AUTO: 99 FL (ref 78–100)
MONOCYTES # BLD AUTO: 0 10E9/L (ref 0–1.3)
MONOCYTES NFR BLD AUTO: 0.6 %
NEUTROPHILS # BLD AUTO: 5.1 10E9/L (ref 1.6–8.3)
NEUTROPHILS NFR BLD AUTO: 94.2 %
NRBC # BLD AUTO: 0 10*3/UL
NRBC BLD AUTO-RTO: 0 /100
PLATELET # BLD AUTO: 258 10E9/L (ref 150–450)
POTASSIUM SERPL-SCNC: 3.7 MMOL/L (ref 3.4–5.3)
RBC # BLD AUTO: 2.79 10E12/L (ref 3.8–5.2)
SODIUM SERPL-SCNC: 142 MMOL/L (ref 133–144)
WBC # BLD AUTO: 5.4 10E9/L (ref 4–11)

## 2018-01-01 PROCEDURE — 80048 BASIC METABOLIC PNL TOTAL CA: CPT

## 2018-01-01 PROCEDURE — 25000128 H RX IP 250 OP 636: Performed by: NURSE PRACTITIONER

## 2018-01-01 PROCEDURE — 25000132 ZZH RX MED GY IP 250 OP 250 PS 637: Performed by: NURSE PRACTITIONER

## 2018-01-01 PROCEDURE — 25000125 ZZHC RX 250

## 2018-01-01 PROCEDURE — 25000128 H RX IP 250 OP 636

## 2018-01-01 PROCEDURE — 85025 COMPLETE CBC W/AUTO DIFF WBC: CPT

## 2018-01-01 PROCEDURE — 99239 HOSP IP/OBS DSCHRG MGMT >30: CPT | Performed by: INTERNAL MEDICINE

## 2018-01-01 PROCEDURE — 36592 COLLECT BLOOD FROM PICC: CPT

## 2018-01-01 PROCEDURE — 25000125 ZZHC RX 250: Performed by: NURSE PRACTITIONER

## 2018-01-01 PROCEDURE — 25000131 ZZH RX MED GY IP 250 OP 636 PS 637

## 2018-01-01 RX ORDER — DIAZEPAM 5 MG
5 TABLET ORAL 3 TIMES DAILY PRN
Qty: 90 TABLET | Refills: 2 | Status: SHIPPED | OUTPATIENT
Start: 2018-01-01 | End: 2018-01-24

## 2018-01-01 RX ORDER — DEXAMETHASONE 4 MG/1
8 TABLET ORAL DAILY
Qty: 4 TABLET | Refills: 0 | Status: ON HOLD | OUTPATIENT
Start: 2018-01-02 | End: 2018-01-22

## 2018-01-01 RX ADMIN — ACYCLOVIR 400 MG: 400 TABLET ORAL at 06:07

## 2018-01-01 RX ADMIN — CYCLOPHOSPHAMIDE 1810 MG: 2 INJECTION, POWDER, FOR SOLUTION INTRAVENOUS; ORAL at 10:09

## 2018-01-01 RX ADMIN — DIAZEPAM 5 MG: 5 TABLET ORAL at 11:28

## 2018-01-01 RX ADMIN — ONDANSETRON HYDROCHLORIDE 8 MG: 8 TABLET, FILM COATED ORAL at 11:29

## 2018-01-01 RX ADMIN — ONDANSETRON HYDROCHLORIDE 8 MG: 8 TABLET, FILM COATED ORAL at 04:35

## 2018-01-01 RX ADMIN — OXYCODONE HYDROCHLORIDE 30 MG: 30 TABLET ORAL at 00:24

## 2018-01-01 RX ADMIN — MORPHINE SULFATE 30 MG: 30 TABLET, EXTENDED RELEASE ORAL at 11:28

## 2018-01-01 RX ADMIN — CELECOXIB 200 MG: 200 CAPSULE ORAL at 06:07

## 2018-01-01 RX ADMIN — LEVOTHYROXINE SODIUM 224 MCG: 112 TABLET ORAL at 06:07

## 2018-01-01 RX ADMIN — CETIRIZINE HYDROCHLORIDE 10 MG: 10 TABLET, FILM COATED ORAL at 11:29

## 2018-01-01 RX ADMIN — CROMOLYN SODIUM 1 DROP: 40 SOLUTION/ DROPS OPHTHALMIC at 08:45

## 2018-01-01 RX ADMIN — DICLOFENAC SODIUM 2 G: 10 GEL TOPICAL at 08:45

## 2018-01-01 RX ADMIN — SODIUM CHLORIDE, PRESERVATIVE FREE 5 ML: 5 INJECTION INTRAVENOUS at 11:28

## 2018-01-01 RX ADMIN — METHOCARBAMOL 750 MG: 750 TABLET ORAL at 11:28

## 2018-01-01 RX ADMIN — CETIRIZINE HYDROCHLORIDE 10 MG: 10 TABLET, FILM COATED ORAL at 06:07

## 2018-01-01 RX ADMIN — HYDROCHLOROTHIAZIDE 12.5 MG: 12.5 CAPSULE ORAL at 08:45

## 2018-01-01 RX ADMIN — SODIUM CHLORIDE 150 MG: 9 INJECTION, SOLUTION INTRAVENOUS at 09:39

## 2018-01-01 RX ADMIN — ALLOPURINOL 300 MG: 300 TABLET ORAL at 11:29

## 2018-01-01 RX ADMIN — RANITIDINE 75 MG: 75 TABLET, FILM COATED ORAL at 06:07

## 2018-01-01 RX ADMIN — OXYCODONE HYDROCHLORIDE 30 MG: 30 TABLET ORAL at 04:35

## 2018-01-01 RX ADMIN — MONTELUKAST SODIUM 20 MG: 10 TABLET, FILM COATED ORAL at 06:07

## 2018-01-01 RX ADMIN — CALCITRIOL 0.5 MCG: 0.5 CAPSULE, LIQUID FILLED ORAL at 08:45

## 2018-01-01 RX ADMIN — FUROSEMIDE 20 MG: 20 TABLET ORAL at 08:45

## 2018-01-01 RX ADMIN — DIAZEPAM 5 MG: 5 TABLET ORAL at 04:35

## 2018-01-01 RX ADMIN — MORPHINE SULFATE 30 MG: 30 TABLET, EXTENDED RELEASE ORAL at 06:06

## 2018-01-01 RX ADMIN — RANITIDINE 75 MG: 75 TABLET, FILM COATED ORAL at 11:29

## 2018-01-01 RX ADMIN — Medication 2 TABLET: at 06:07

## 2018-01-01 RX ADMIN — PREDNISONE 60 MG: 50 TABLET ORAL at 08:45

## 2018-01-01 RX ADMIN — Medication 2 TABLET: at 11:29

## 2018-01-01 RX ADMIN — FLUCONAZOLE 200 MG: 200 TABLET ORAL at 11:29

## 2018-01-01 RX ADMIN — METHOCARBAMOL 750 MG: 750 TABLET ORAL at 06:07

## 2018-01-01 ASSESSMENT — PAIN DESCRIPTION - DESCRIPTORS
DESCRIPTORS: ACHING
DESCRIPTORS: ACHING;CONSTANT

## 2018-01-01 NOTE — PROGRESS NOTES
DI: Reviewed discharge orders with Elvin and she verbalized understanding.  AP: Elvin has a follow up clinic appointment and phone numbers to call with questions.

## 2018-01-01 NOTE — DISCHARGE SUMMARY
"[facility], [place]    Discharge Summary  Hematology / Oncology    Date of Admission:  12/28/2017  Date of Discharge:  1/1/2018  Discharging Provider: Kat Dyer  Date of Service (when I saw the patient): 01/01/18    Discharge Diagnoses   High grade B-cell lymphoma     History of Present Illness   Tisha Arias is a 57 year old woman with PMHx of breast cancer s/p bilateral mastectomies and BENJIE/BSO, papillary thyroid cancer s/p total thyroidectomy, chronic chest wall pain, muscle spasms, asthma, h/o RNY gastric bypass, and high grade DLBCL. She was admitted for C5 DA-R-EPOCH for DLBCL.     Hospital Course   Tisha Arias was admitted on 12/28/2017.  The following problems were addressed during her hospitalization:     HEME/ONC  # High grade DLBCL.  \"Double hit-like.\" Primary is Dr. Sauceda. Patient diagnosed August 2017. Lytic lesion of right 10th rib with extension. Disease localized above the diaphragm in thoracic and paravertebral soft tissue and mediastinal lymphadenopathy. 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 Staging LP and BMBx were negative for lymphoma. Initiated cycle 1 DA-R-EPOCH on 10/6/17. Has completed 4 cycles. Had PET showing CR on 12/21/17. She presented for cycle 5 DA-R-EPOCH with no changes in dose r/t ANC 0.3 on outpatient labs.       Treatment plan: Cycle 5 DA-R-EPOCH (Day 1=12/28/17).   -LP with IT chemotherapy (completed 12/28). CSF flow negative.  -Neulasta 24-72 hours after chemo. Scheduled for 1/2/18.   -Discharged with D6-7 Dexamethasone 8 mg.      #Anemia. 2/2 disease and chemotherapy.  No transfusions needed during hospitalization. On discharge will continue to monitor with twice weekly labs and transfuse if Hgb <8 and Plt <10k.       #Stage 1, ER/VA-positive, HER2-negative breast cancer.   Follows with " Dr. Crawley. Diagnosed in 2011. Oncotype recurrence score of 11 (7% recurrence risk after 5 years of tamoxifen). S/p bilateral mastectomies and BENJIE/BSO in 2012. Was initially on Arimidex ([id number]) but changed to Tamoxifen due to arthralgias. Tamoxifen held since 10/5 and continue to hold with chemotherapy. Last f/u Dr. Crawley was on 11/27/17.      # Papillary thyroid cancer:  Follows with Dr. Castro. Diagnosed in 2013. S/p total thyroidectomy and CND. Received ETOH treatment August 2014, October 2014, and December 2014. Has post-surgical hypothyroidism. Continue PTA Levothyroxine. Continue PTA Calcitriol.      GI  # H/o Rachael en Y gastric bypass:   Likely affecting absorption; thus patient is on multiple supplements. Continue supplements (calcium citrate-vitamin D, vitamin D3, magnesium citrate). Also has iron deficiency anemia likely from poor absorption and per Dr. Sauceda, she should continue 325mg PO ferrous sulfate TID (resume outpatient).       PAIN  # Chronic chest wall pain after mastectomy.   Continue MS Contin 30/30/60 mg TID, Oxycodone 15-30 mg q4hrs prn, Celebrex 200 BID, Voltaren gel QID, lidocaine cream prn.      #Chronic muscle cramps.   Continue Robaxin 750 mg QID, Valium 5 mg TID, Flexeril prn.      ID  #Anti-infectives.  Continue PTA Acyclovir and Fluconazole (per pt request). Advised to resume Levofloxacin on discharge.      CV  #Lymphedema of lower extremities.  Continue HCTZ and Lasix as per PTA.      PULM  #Asthma, allergies.   Continue PTA singular, zyrtec.     Disposition: Discharged 1/1/18 with f/u for Neulasta and labs on Tuesday 1/2.    Above plan discussed with staff physician, Dr. Soren Dyer PA-C  Hematology/Oncology  Pager: [phone]    Significant Results and Procedures   Results for orders placed or performed during the hospital encounter of 12/28/17   XR Lumbar Punc Intrathecal Chemo Admin    Narrative    Lumbar Puncture using Fluoroscopy    History:  Intrathecal chemotherapy    Procedure note: Verbal and written consent for lumbar puncture was  obtained from the patient, and benefits and risk of the procedure were  explained, including but not limited to worsening headache,  hemorrhage, infection, lower extremity pain, or nerve root injury. The  patient was sterilely prepped and draped with the patient in the  prone/decubitus position, over the lower back. Under fluoroscopic  guidance, the interlaminar spaces were noted. 1% lidocaine was  administered for local anesthetic over the L2-3 interlaminar space,  and a 22 gauge 3.5 inch needle was advanced into the thecal sac under  fluoroscopic guidance.  There was initial show of clear CSF.  Approximately 10 cc of CSF were collected.    The needle was removed with the stylet in place. There was no  immediate complication associated with the procedure. Samples were  sent for the requested laboratory testing. Intrathecal chemotherapy  was administered by the oncologist.     Estimated blood loss: Less than 1 cc.    Fluoroscopic time: 24 seconds      Impression    Impression: Successful lumbar puncture without immediate complication.    I, JORGE L LEWIS MD, attest that I was present for all critical  portions of the procedure and was immediately available to provide  guidance and assistance during the remainder of the procedure.    I have personally reviewed the examination and initial interpretation  and I agree with the findings.    JORGE L LEWIS MD     *Note: Due to a large number of results and/or encounters for the requested time period, some results have not been displayed. A complete set of results can be found in Results Review.     Pending Results   These results will be followed up in clinic  Unresulted Labs Ordered in the Past 30 Days of this Admission     Date and Time Order Name Status Description    12/28/2017 1152 CSF Culture Aerobic Bacterial Preliminary     11/27/2017 1422 Platelets prepare order unit  In process     11/9/2017 1113 Platelets prepare order unit In process           Code Status   Full Code    Primary Care Physician   Shahla Crawley    Physical Exam   Temp: 97.4  F (36.3  C) Temp src: Oral BP: 118/55 Pulse: 79 Heart Rate: 101 Resp: 18 SpO2: 95 % O2 Device: None (Room air)    Vitals:    12/29/17 0845 12/30/17 0758 12/31/17 0748   Weight: 87.5 kg (193 lb) 88.8 kg (195 lb 11.2 oz) 88.9 kg (195 lb 14.4 oz)     Vital Signs with Ranges  Temp:  [96.5  F (35.8  C)-98.6  F (37  C)] 97.4  F (36.3  C)  Pulse:  [79-95] 79  Heart Rate:  [] 101  Resp:  [16-20] 18  BP: (110-128)/(52-75) 118/55  SpO2:  [93 %-96 %] 95 %  I/O last 3 completed shifts:  In: 3733.8 [P.O.:3125; I.V.:20; IV Piggyback:588.8]  Out: 6050 [Urine:6050]    Time Spent on this Encounter   I, Kat Dyer, personally saw the patient today and spent less than or equal to 30 minutes discharging this patient.    Discharge Disposition   Discharged to home  Condition at discharge: Stable    Consultations This Hospital Stay   PALLIATIVE CARE ADULT IP CONSULT  PALLIATIVE CARE ADULT IP CONSULT    Discharge Orders   No discharge procedures on file.  Discharge Medications   Current Discharge Medication List      CONTINUE these medications which have CHANGED    Details   dexamethasone (DECADRON) 4 MG tablet Take 2 tablets (8 mg) by mouth daily Please take on 1/2/18 and 1/3/18.  Qty: 4 tablet, Refills: 0    Associated Diagnoses: High grade B-cell lymphoma (H)      morphine (MS CONTIN) 30 MG 12 hr tablet Take 1 tablet (30 mg) by mouth every 8 hours . Take an addition pill at night such that your evening dose is 60mg  Qty: 120 tablet, Refills: 0    Associated Diagnoses: Neoplasm related pain      oxyCODONE IR (ROXICODONE) 30 MG tablet Take 1 tablet (30 mg) by mouth every 4 hours as needed for moderate to severe pain  Qty: 180 tablet, Refills: 0    Associated Diagnoses: High grade B-cell lymphoma (H)         CONTINUE these medications which have NOT  CHANGED    Details   prochlorperazine (COMPAZINE) 10 MG tablet Take 10 mg by mouth as needed  Refills: 3      SUMAtriptan (IMITREX) 50 MG tablet Take 50 mg by mouth as needed  Refills: 3      cromolyn sodium (NASALCROM) 5.2 MG/ACT AERS Inhaler SPRAY ONE SPRAY( 1 ML) IN NOSTRIL DAILY  Qty: 1 Bottle, Refills: 6    Associated Diagnoses: Mast cell disease, systemic      acetaminophen (TYLENOL) 325 MG tablet Take 2 tablets (650 mg) by mouth every 4 hours as needed for mild pain or fever  Qty: 100 tablet    Associated Diagnoses: High grade B-cell lymphoma (H)      magic mouthwash suspension (diphenhydrAMINE, lidocaine, aluminum-magnesium & simethicone) Swish and spit 10 mLs in mouth every 6 hours as needed for mouth sores  Qty: 360 mL, Refills: 1    Associated Diagnoses: Stomatitis and mucositis; High grade B-cell lymphoma (H)      bisacodyl (DULCOLAX) 5 MG EC tablet Take 3 tablets (15 mg) by mouth daily as needed for constipation  Qty: 30 tablet, Refills: 0    Associated Diagnoses: Constipation, unspecified constipation type      potassium chloride SA (POTASSIUM CHLORIDE) 20 MEQ CR tablet Take 1 tablet (20 mEq) by mouth daily  Qty: 90 tablet, Refills: 3    Associated Diagnoses: Hypokalemia      aluminum chloride (DRYSOL) 20 % external solution Apply topically At Bedtime  Qty: 60 mL, Refills: 3    Associated Diagnoses: Rash; Intertrigo      ondansetron (ZOFRAN-ODT) 8 MG ODT tab Take 1 tablet (8 mg) by mouth every 8 hours as needed  Qty: 30 tablet, Refills: 0    Associated Diagnoses: High grade B-cell lymphoma (H); Nausea and vomiting, intractability of vomiting not specified, unspecified vomiting type      fluconazole (DIFLUCAN) 200 MG tablet Take 1 tablet (200 mg) by mouth daily  Qty: 30 tablet, Refills: 0    Associated Diagnoses: High grade B-cell lymphoma (H)      cetirizine (ZYRTEC ALLERGY) 10 MG tablet Take 1 tablet (10 mg) by mouth 3 times daily On hold for lab test.  Qty: 30 tablet, Refills: 0    Associated  Diagnoses: High grade B-cell lymphoma (H)      acyclovir (ZOVIRAX) 400 MG tablet Take 1 tablet (400 mg) by mouth 2 times daily  Qty: 60 tablet, Refills: 0    Associated Diagnoses: High grade B-cell lymphoma (H)      diclofenac (VOLTAREN) 1 % GEL topical gel Apply affected area two times daily PRN using enclosed dosing card.  Qty: 100 g, Refills: 0    Associated Diagnoses: Myofascial pain      levofloxacin (LEVAQUIN) 250 MG tablet Take 1 tablet (250 mg) by mouth daily  Qty: 30 tablet, Refills: 0    Associated Diagnoses: High grade B-cell lymphoma (H)      allopurinol (ZYLOPRIM) 300 MG tablet Take 1 tablet (300 mg) by mouth daily  Qty: 30 tablet, Refills: 0    Associated Diagnoses: High grade B-cell lymphoma (H)      ferrous sulfate (IRON) 325 (65 FE) MG tablet Take 1 tablet (325 mg) by mouth 3 times daily (with meals)  Qty: 90 tablet, Refills: 0    Associated Diagnoses: Status post bariatric surgery      cyclobenzaprine (FLEXERIL) 10 MG tablet Take 1 tablet (10 mg) by mouth 3 times daily as needed  Qty: 30 tablet, Refills: 0    Associated Diagnoses: High grade B-cell lymphoma (H)      !! levothyroxine (SYNTHROID/LEVOTHROID) 112 MCG tablet Take 3 tablets (336 mcg) by mouth once a week  Qty: 30 tablet    Associated Diagnoses: High grade B-cell lymphoma (H)      [employer] FIBER GUMMY BEARS CHILDREN CHEW Take 5 g by mouth daily (2 gummy= 5 g =1 serving)    Associated Diagnoses: High grade B-cell lymphoma (H)      triamcinolone (KENALOG) 0.025 % ointment Apply topically as needed  Refills: 3      lidocaine (XYLOCAINE) 5 % ointment Apply quarter size amount to chest and back up to 3 times daily as needed for pain.  Qty: 350 g, Refills: 1    Associated Diagnoses: Chest wall pain      montelukast (SINGULAIR) 10 MG tablet Take 1 tablet (10 mg) by mouth 2 times daily  Qty: 60 tablet, Refills: 0    Associated Diagnoses: Idiopathic mast cell activation syndrome (H)      furosemide (LASIX) 20 MG tablet Take 1 tablet (20 mg) by mouth 2  times daily  Qty: 60 tablet, Refills: 0    Associated Diagnoses: Localized edema      polyethylene glycol (MIRALAX) powder Take 17 g (1 capful) by mouth daily  Qty: 510 g, Refills: 0    Associated Diagnoses: Acute constipation      senna-docusate (SENOKOT-S;PERICOLACE) 8.6-50 MG per tablet Take 1-2 tablets by mouth 2 times daily as needed for constipation  Qty: 60 tablet, Refills: 0    Associated Diagnoses: Acute constipation      !! UNABLE TO FIND Take 2 capsules by mouth 3 times daily Muscle Mag. 2 caps contain B1 20mg, B2 20mg, B6 10mg, magesium 20mg, manganese 2mg.      lidocaine-prilocaine (EMLA) cream Apply topically as needed (port access pain.)  Qty: 30 g, Refills: 1    Associated Diagnoses: Neoplasm related pain; Chest wall pain      calcitRIOL (ROCALTROL) 0.25 MCG capsule TAKE 2 CAPSULES BY MOUTH EVERY MORNING AND TAKE 1 CAPSULE BY MOUTH EVERY NIGHT AT BEDTIME  Qty: 270 capsule, Refills: 3    Associated Diagnoses: Postsurgical hypothyroidism; Papillary carcinoma, follicular variant (H); Metastasis to cervical lymph node (H)      celecoxib (CELEBREX) 200 MG capsule TAKE ONE CAPSULE BY MOUTH TWICE DAILY   Qty: 56 capsule, Refills: 0    Associated Diagnoses: Chest wall pain      !! order for DME Compression stockings, medium grade, please measure and fit. Please dispense a pair.  Qty: 1 Units, Refills: 0    Associated Diagnoses: Peripheral edema      methocarbamol (ROBAXIN) 750 MG tablet Take 1 tablet (750 mg) by mouth 4 times daily as needed . Ok to take a 5th Robaxin on very severe days.  Qty: 360 tablet, Refills: 1    Associated Diagnoses: Myofascial pain      COMPOUND CONTAINING CONTROLLED SUBSTANCE (CMPD RX) - PHARMACY TO MIX COMPOUNDED MEDICATION Apply small amount to affected area two times daily.  Qty: 120 g, Refills: 6    Comments: Ketamine 6% + lidocaine 10% in Lipo.  Associated Diagnoses: Neoplasm related pain      diazepam (VALIUM) 5 MG tablet Take 1 tablet (5 mg) by mouth 3 times daily as needed  For muscle spasms  Qty: 90 tablet, Refills: 2    Associated Diagnoses: Chest wall pain      !! levothyroxine (SYNTHROID/LEVOTHROID) 112 MCG tablet Mon-Sat: (2 tabs daily) Sunday: 3 tabs  Qty: 189 tablet, Refills: 3    Associated Diagnoses: Postsurgical hypothyroidism; Papillary carcinoma, follicular variant (H); Postsurgical hypoparathyroidism (H); Thyroid cancer (H)      hydrochlorothiazide (MICROZIDE) 12.5 MG capsule Take 1 capsule (12.5 mg) by mouth daily  Qty: 90 capsule, Refills: 2    Associated Diagnoses: Hypocalcemia      EPINEPHrine (EPIPEN 2-CARIDAD) 0.3 MG/0.3ML injection Inject 0.3 mLs (0.3 mg) into the muscle once as needed for anaphylaxis  Qty: 0.6 mL, Refills: 3      VENTOLIN  (90 BASE) MCG/ACT Inhaler INHALE 2 PUFFS INTO THE LUNGS EVERY 4 HOURS AS NEEDED FOR SHORTNESS OF BREATH OR DIFFICULT BREATHING OR WHEEZING  Qty: 18 g, Refills: 3    Associated Diagnoses: Mild intermittent asthma without complication      cromolyn (OPTICROM) 4 % ophthalmic solution Place 1 drop into both eyes 4 times daily  Qty: 10 mL, Refills: 5    Associated Diagnoses: Idiopathic mast cell activation syndrome (H)      Cholecalciferol (VITAMIN D3) 2000 UNITS CAPS Take 5,000 Units by mouth daily Takes 2 tabs daily  Qty: 60 capsule, Refills: 4    Associated Diagnoses: Thyroid cancer (H); Postsurgical hypothyroidism; Papillary carcinoma, follicular variant (H); Postsurgical hypoparathyroidism (H)      !! order for DME Equipment being ordered: compression stockings. 20-30 mm or 30 - 40 mm as patient can tolerate. Physical therapy to determine if they should be above or below the knee.  Qty: 2 Units, Refills: 4    Associated Diagnoses: Venous stasis      !! order for DME Equipment being ordered: compression bra  Qty: 2 Device, Refills: 1    Associated Diagnoses: Malignant neoplasm of right female breast, unspecified site of breast      ranitidine (ZANTAC) 75 MG tablet Take 1 tablet (75 mg) by mouth 3 times daily  Qty: 270 tablet,  Refills: 3    Associated Diagnoses: Mast cell disease, systemic      calcium citrate-vitamin D (CALCIUM CITRATE +D) 315-250 MG-UNIT TABS Take 2 tablets by mouth twice a week   Qty: 120 tablet      calcium carbonate (TUMS) 500 MG chewable tablet Take 2 tablets (1,000 mg) by mouth nightly as needed for other (cramps)  Qty: 180 chew tab, Refills: 3      !! UNABLE TO FIND 3 tablets 3 times daily MEDICATION NAME: calcium D-Glucarate   3 caps contain 180mg of elemental calcium.      Triamcinolone Acetonide (AZMACORT IN) Inhale 2 puffs into the lungs as needed      !! UNABLE TO FIND 2 tablets 3 times daily MEDICATION NAME: Digestzymes     Associated Diagnoses: Thyroid cancer (H); Postsurgical hypothyroidism; Postsurgical hypoparathyroidism (H)      !! UNABLE TO FIND 1 tablet daily MEDICATION NAME: Pure Encapsulations    Associated Diagnoses: Thyroid cancer (H); Postsurgical hypothyroidism; Postsurgical hypoparathyroidism (H)      Probiotic Product (PROBIOTIC DAILY PO) Take 1 capsule by mouth daily Lacto acid bifidobacterium    Associated Diagnoses: Breast cancer, unspecified laterality; Thyroid cancer (H); Chronic arthralgias of knees and hips, unspecified laterality       !! - Potential duplicate medications found. Please discuss with provider.        Allergies   Allergies   Allergen Reactions     Baclofen Other (See Comments) and Unknown     Other reaction(s): Edema, chest pain, seizures.      No Clinical Screening - See Comments Shortness Of Breath, Palpitations, Anaphylaxis, Itching, Swelling, Difficulty breathing and Rash     Sukhjinder wipes- oral allergy -  July 2015: throat tightness from a Chinese herbal medicine Wilmer Tran     Serotonin Anxiety, Other (See Comments) and Swelling     Seizures      Tree Nuts [Nuts] Shortness Of Breath     Amitriptyline Hcl Swelling     Birch Trees      Potatoes, carrots, cherries, celery, apple, pears, plums, peaches, parsnip, kiwi, hazelnuts, and apricots,      Blue Dyes  Itching     Headaches       Cymbalta Other (See Comments)     Flushing, tremor/muscle twitching and edema     Gabapentin Other (See Comments)     edema  Systemic edema, weaned off from Feb to March per Dr. Dowd.    edema     Grass      Mugwort [Artemisia Vulgaris]      Various spices     Ragweeds      Melons, bananas, cucumbers, zucchini.     Topamax      Nortriptyline Itching, Visual Disturbance, Swelling, GI Disturbance, Anxiety, Other (See Comments) and Nausea     Other reaction(s): Swelling     Data   ROUTINE IP LABS (Last four results)  BMP  Recent Labs  Lab 01/01/18  0644 12/31/17  0632 12/30/17  0718 12/29/17  0625    140 142 141   POTASSIUM 3.7 3.8 3.7 3.9   CHLORIDE 105 103 107 106   ELMO 7.8* 8.4* 8.0* 8.3*   CO2 32 28 27 28   BUN 26 26 19 16   CR 0.72 0.84 0.68 0.76   * 126* 161* 138*     CBC  Recent Labs  Lab 01/01/18  0644 12/31/17  0632 12/30/17  0718 12/29/17  0625   WBC 5.4 8.4 10.6 9.8   RBC 2.79* 2.93* 2.82* 3.00*   HGB 9.0* 9.4* 8.8* 9.5*   HCT 27.5* 29.9* 28.5* 30.1*   MCV 99 102* 101* 100   MCH 32.3 32.1 31.2 31.7   MCHC 32.7 31.4* 30.9* 31.6   RDW 20.4* 21.8* 22.2* 22.3*    282 271 277     INR  Recent Labs  Lab 12/28/17  1224   INR 0.99

## 2018-01-01 NOTE — PLAN OF CARE
"Problem: Patient Care Overview  Goal: Plan of Care/Patient Progress Review  Patient Vitals for the past 8 hrs:   Temp Temp src Heart Rate BP Resp   12/31/17 1954 96.6  F (35.9  C) Oral 101 110/68 18   12/31/17 1524 98.2  F (36.8  C) Oral - 122/66 20         VSS. HR tachy low 100s. Complaining of muscular/nerve pain in back and chest \"like I'm being hung from a meat rack.\"  Voltaran Gel, morphine, oxycodone and flexeril helpful for management of pain. Edema in BLE. Voiding adequately. Day 4 of Etoposide/Vincristine/Doxorubacin infusing through port with brisk blood return q 4hrs.      "

## 2018-01-01 NOTE — PLAN OF CARE
Problem: Patient Care Overview  Goal: Plan of Care/Patient Progress Review  Outcome: No Change  AVSS, afebrile. Reports ongoing back pain, Oxycodone x2 given, scheduled MS Contin given. CIVI chemo infusing, plans to d/c today post chemo. Pt voided appropriately, no BM overnight. Refused Senna, reports stools are soft. Independent. Continue with POC.

## 2018-01-02 LAB
BACTERIA SPEC CULT: NO GROWTH
SPECIMEN SOURCE: NORMAL

## 2018-01-03 ENCOUNTER — MYC REFILL (OUTPATIENT)
Dept: PALLIATIVE CARE | Facility: CLINIC | Age: 58
End: 2018-01-03

## 2018-01-03 ENCOUNTER — ALLIED HEALTH/NURSE VISIT (OUTPATIENT)
Dept: ONCOLOGY | Facility: CLINIC | Age: 58
End: 2018-01-03
Attending: INTERNAL MEDICINE
Payer: COMMERCIAL

## 2018-01-03 ENCOUNTER — MYC REFILL (OUTPATIENT)
Dept: ONCOLOGY | Facility: CLINIC | Age: 58
End: 2018-01-03

## 2018-01-03 ENCOUNTER — CARE COORDINATION (OUTPATIENT)
Dept: ONCOLOGY | Facility: CLINIC | Age: 58
End: 2018-01-03

## 2018-01-03 VITALS
SYSTOLIC BLOOD PRESSURE: 113 MMHG | HEART RATE: 83 BPM | RESPIRATION RATE: 18 BRPM | BODY MASS INDEX: 31.45 KG/M2 | DIASTOLIC BLOOD PRESSURE: 74 MMHG | WEIGHT: 189 LBS | TEMPERATURE: 98.1 F | OXYGEN SATURATION: 98 %

## 2018-01-03 DIAGNOSIS — C85.10 HIGH GRADE B-CELL LYMPHOMA (H): Primary | ICD-10-CM

## 2018-01-03 DIAGNOSIS — C85.10 HIGH GRADE B-CELL LYMPHOMA (H): ICD-10-CM

## 2018-01-03 DIAGNOSIS — R07.89 CHEST WALL PAIN: ICD-10-CM

## 2018-01-03 DIAGNOSIS — G89.3 NEOPLASM RELATED PAIN: ICD-10-CM

## 2018-01-03 DIAGNOSIS — R11.2 NAUSEA AND VOMITING, INTRACTABILITY OF VOMITING NOT SPECIFIED, UNSPECIFIED VOMITING TYPE: ICD-10-CM

## 2018-01-03 LAB
BASOPHILS # BLD AUTO: 0 10E9/L (ref 0–0.2)
BASOPHILS NFR BLD AUTO: 0.2 %
DIFFERENTIAL METHOD BLD: ABNORMAL
EOSINOPHIL # BLD AUTO: 0.1 10E9/L (ref 0–0.7)
EOSINOPHIL NFR BLD AUTO: 0.9 %
ERYTHROCYTE [DISTWIDTH] IN BLOOD BY AUTOMATED COUNT: 19.4 % (ref 10–15)
HCT VFR BLD AUTO: 27.6 % (ref 35–47)
HGB BLD-MCNC: 8.9 G/DL (ref 11.7–15.7)
IMM GRANULOCYTES # BLD: 0.2 10E9/L (ref 0–0.4)
IMM GRANULOCYTES NFR BLD: 1.6 %
LYMPHOCYTES # BLD AUTO: 0.1 10E9/L (ref 0.8–5.3)
LYMPHOCYTES NFR BLD AUTO: 0.7 %
MCH RBC QN AUTO: 32.4 PG (ref 26.5–33)
MCHC RBC AUTO-ENTMCNC: 32.2 G/DL (ref 31.5–36.5)
MCV RBC AUTO: 100 FL (ref 78–100)
MONOCYTES # BLD AUTO: 0 10E9/L (ref 0–1.3)
MONOCYTES NFR BLD AUTO: 0.1 %
NEUTROPHILS # BLD AUTO: 9.9 10E9/L (ref 1.6–8.3)
NEUTROPHILS NFR BLD AUTO: 96.5 %
NRBC # BLD AUTO: 0 10*3/UL
NRBC BLD AUTO-RTO: 0 /100
PLATELET # BLD AUTO: 233 10E9/L (ref 150–450)
RBC # BLD AUTO: 2.75 10E12/L (ref 3.8–5.2)
WBC # BLD AUTO: 10.3 10E9/L (ref 4–11)

## 2018-01-03 PROCEDURE — 36591 DRAW BLOOD OFF VENOUS DEVICE: CPT

## 2018-01-03 PROCEDURE — 25000128 H RX IP 250 OP 636: Mod: ZF

## 2018-01-03 PROCEDURE — 85025 COMPLETE CBC W/AUTO DIFF WBC: CPT | Performed by: PHYSICIAN ASSISTANT

## 2018-01-03 PROCEDURE — 96372 THER/PROPH/DIAG INJ SC/IM: CPT

## 2018-01-03 PROCEDURE — 25000128 H RX IP 250 OP 636: Performed by: INTERNAL MEDICINE

## 2018-01-03 RX ORDER — ONDANSETRON 8 MG/1
8 TABLET, ORALLY DISINTEGRATING ORAL EVERY 8 HOURS PRN
Qty: 30 TABLET | Refills: 1 | Status: ON HOLD | OUTPATIENT
Start: 2018-01-03 | End: 2018-01-22

## 2018-01-03 RX ORDER — HEPARIN SODIUM (PORCINE) LOCK FLUSH IV SOLN 100 UNIT/ML 100 UNIT/ML
500 SOLUTION INTRAVENOUS DAILY PRN
Status: DISCONTINUED | OUTPATIENT
Start: 2018-01-03 | End: 2018-01-11 | Stop reason: HOSPADM

## 2018-01-03 RX ADMIN — SODIUM CHLORIDE, PRESERVATIVE FREE 500 UNITS: 5 INJECTION INTRAVENOUS at 09:06

## 2018-01-03 RX ADMIN — PEGFILGRASTIM 6 MG: 6 INJECTION SUBCUTANEOUS at 09:36

## 2018-01-03 ASSESSMENT — PAIN SCALES - GENERAL: PAINLEVEL: SEVERE PAIN (6)

## 2018-01-03 NOTE — MR AVS SNAPSHOT
After Visit Summary   1/3/2018    Tisha Arias    MRN: [id number]           Patient Information     Date Of Birth          1960        Visit Information        Provider Department      1/3/2018 9:00 AM Nurse, [facility] Oncology Injection [facility]        Today's Diagnoses     High grade B-cell lymphoma (H)    -  1       Follow-ups after your visit        Your next 10 appointments already scheduled     Jan 05, 2018  9:30 AM CST   M[facility] Lab Draw with UC [facility]IC LAB DRAW   [facility]    [address] [address]   [phone]            Jan 09, 2018  6:30 AM CST   Masonic Lab Draw with UC MASONIC LAB DRAW   [facility] Masonic Lab Draw ([facility])    [address] [address]   [phone]            Jan 09, 2018  7:00 AM CST   Infusion 360 with UC ONCOLOGY INFUSION, UC 12 ATC   [facility] ([facility])    [address] [address]   [phone]            Jan 12, 2018 10:00 AM CST   Masonic Lab Draw with UC MASONIC LAB DRAW   [facility]    [address] [address]   [phone]            Jan 12, 2018 10:30 AM CST   Infusion 360 with UC ONCOLOGY INFUSION, UC 17 ATC   [facility] ([facility])    [address] [address]   [phone]            Noe 15, 2018  8:30 AM CST   Masonic Lab Draw with UC MASONIC LAB DRAW   [facility] Masonic Lab Draw ([facility])    [address] [address]   [phone]            Noe 15, 2018  9:00 AM CST   Infusion 360 with UC ONCOLOGY INFUSION, UC 26 ATC   [facility] ([facility])    [address] [facility]  [address]nd [facility] [address] [phone]              Future tests that were ordered for you today     Open Standing Orders        Priority Remaining Interval Expires Ordered    Red blood cell prepare order unit Routine 99/100 CONDITIONAL (SPECIFY) BLOOD  12/29/2017    Platelets prepare order unit Routine 99/100 CONDITIONAL (SPECIFY) BLOOD  12/29/2017            Who to contact     If you have questions or need follow up information about today's clinic visit or your schedule please contact [facility] directly at [phone].  Normal or non-critical lab and imaging results will be communicated to you by PanX[employer], letter or phone within 4 business days after the clinic has received the results. If you do not hear from us within 7 days, please contact the clinic through [employer] or phone. If you have a critical or abnormal lab result, we will notify you by phone as soon as possible.  Submit refill requests through [employer] or call your pharmacy and they will forward the refill request to us. Please allow 3 business days for your refill to be completed.          Additional Information About Your Visit        PanXhart Information     [employer] gives you secure access to your electronic health record. If you see a primary care provider, you can also send messages to your care team and make appointments. If you have questions, please call your primary care clinic.  If you do not have a primary care provider, please call [phone] and they will assist you.        Care EveryWhere ID     This is your Care EveryWhere ID. This could be used by other organizations to access your [facility] medical records  [id number]        Your Vitals Were     Pulse Temperature Respirations Pulse Oximetry BMI (Body Mass Index)       83 98.1  F (36.7  C) (Oral) 18 98% 31.45 kg/m2        Blood Pressure from Last 3 Encounters:   01/03/18 113/74   01/01/18 120/64   12/26/17  119/80    Weight from Last 3 Encounters:   01/03/18 85.7 kg (189 lb)   01/01/18 87.5 kg (193 lb)   12/26/17 85.5 kg (188 lb 7.9 oz)              We Performed the Following     CBC with platelets differential          Today's Medication Changes          These changes are accurate as of: 1/3/18  9:42 AM.  If you have any questions, ask your nurse or doctor.               These medicines have changed or have updated prescriptions.        Dose/Directions    calcitRIOL 0.25 MCG capsule   Commonly known as:  ROCALTROL   This may have changed:  additional instructions   Used for:  Postsurgical hypothyroidism, Papillary carcinoma, follicular variant (H), Metastasis to cervical lymph node (H)        TAKE 2 CAPSULES BY MOUTH EVERY MORNING AND TAKE 1 CAPSULE BY MOUTH EVERY NIGHT AT BEDTIME   Quantity:  270 capsule   Refills:  3       * levothyroxine 112 MCG tablet   Commonly known as:  SYNTHROID/LEVOTHROID   This may have changed:    - how much to take  - additional instructions   Used for:  Postsurgical hypothyroidism, Papillary carcinoma, follicular variant (H), Postsurgical hypoparathyroidism (H), Thyroid cancer (H)   Changed by:  Avelina Castro MD        Mon-Sat: (2 tabs daily) Sunday: 3 tabs   Quantity:  189 tablet   Refills:  3       * levothyroxine 112 MCG tablet   Commonly known as:  SYNTHROID/LEVOTHROID   This may have changed:  Another medication with the same name was changed. Make sure you understand how and when to take each.   Used for:  High grade B-cell lymphoma (H)        Dose:  336 mcg   Take 3 tablets (336 mcg) by mouth once a week   Quantity:  30 tablet   Refills:  0       methocarbamol 750 MG tablet   Commonly known as:  ROBAXIN   This may have changed:    - when to take this  - additional instructions   Used for:  Myofascial pain        Dose:  750 mg   Take 1 tablet (750 mg) by mouth 4 times daily as needed . Ok to take a 5th Robaxin on very severe days.   Quantity:  360 tablet   Refills:  1        montelukast 10 MG tablet   Commonly known as:  SINGULAIR   This may have changed:  how much to take   Used for:  Idiopathic mast cell activation syndrome (H)        Dose:  10 mg   Take 1 tablet (10 mg) by mouth 2 times daily   Quantity:  60 tablet   Refills:  0       * Notice:  This list has 2 medication(s) that are the same as other medications prescribed for you. Read the directions carefully, and ask your doctor or other care provider to review them with you.             Primary Care Provider Office Phone # Fax #    Shahla Raul Crawley [phone]       [address] [place] [address]        Equal Access to Services     [facility] : Hadii sacha Venegas, waaxda joo, qaybta kaalmada daina, ranjan yoder . So [place] [phone].    ATENCIÓN: Si habla español, tiene a stiles disposición servicios gratuitos de asistencia lingüística. [facility] [phone].    We comply with applicable federal civil rights laws and [place] laws. We do not discriminate on the basis of race, color, national origin, age, disability, sex, sexual orientation, or gender identity.            Thank you!     Thank you for choosing [facility]  for your care. Our goal is always to provide you with excellent care. Hearing back from our patients is one way we can continue to improve our services. Please take a few minutes to complete the written survey that you may receive in the mail after your visit with us. Thank you!             Your Updated Medication List - Protect others around you: Learn how to safely use, store and throw away your medicines at www.disposemymeds.org.          This list is accurate as of: 1/3/18  9:42 AM.  Always use your most recent med list.                   Brand Name Dispense Instructions for use Diagnosis    acetaminophen 325 MG tablet    TYLENOL    100 tablet    Take 2 tablets (650 mg) by mouth every 4 hours as needed for  mild pain or fever    High grade B-cell lymphoma (H)       acyclovir 400 MG tablet    ZOVIRAX    60 tablet    Take 1 tablet (400 mg) by mouth 2 times daily    High grade B-cell lymphoma (H)       allopurinol 300 MG tablet    ZYLOPRIM    30 tablet    Take 1 tablet (300 mg) by mouth daily    High grade B-cell lymphoma (H)       aluminum chloride 20 % external solution    DRYSOL    60 mL    Apply topically At Bedtime    Rash, Intertrigo       AZMACORT IN      Inhale 2 puffs into the lungs as needed        bisacodyl 5 MG EC tablet     30 tablet    Take 3 tablets (15 mg) by mouth daily as needed for constipation    Constipation, unspecified constipation type       calcitRIOL 0.25 MCG capsule    ROCALTROL    270 capsule    TAKE 2 CAPSULES BY MOUTH EVERY MORNING AND TAKE 1 CAPSULE BY MOUTH EVERY NIGHT AT BEDTIME    Postsurgical hypothyroidism, Papillary carcinoma, follicular variant (H), Metastasis to cervical lymph node (H)       CALCIUM CITRATE +D 315-250 MG-UNIT Tabs per tablet   Generic drug:  calcium citrate-vitamin D     120 tablet    Take 2 tablets by mouth twice a week        celecoxib 200 MG capsule    celeBREX    56 capsule    TAKE ONE CAPSULE BY MOUTH TWICE DAILY    Chest wall pain       cetirizine 10 MG tablet    ZYRTEC ALLERGY    30 tablet    Take 1 tablet (10 mg) by mouth 3 times daily On hold for lab test.    High grade B-cell lymphoma (H)       COMPOUND CONTAINING CONTROLLED SUBSTANCE - PHARMACY TO MIX COMPOUNDED MEDICATION    CMPD RX    120 g    Apply small amount to affected area two times daily.    Neoplasm related pain       cromolyn 4 % ophthalmic solution    OPTICROM    10 mL    Place 1 drop into both eyes 4 times daily    Idiopathic mast cell activation syndrome (H)       cromolyn sodium 5.2 MG/ACT Aers Inhaler    NASALCROM    1 Bottle    SPRAY ONE SPRAY( 1 ML) IN NOSTRIL DAILY    Mast cell disease, systemic       [employer] GUMMY BEARS CHILDREN Chew      Take 5 g by mouth daily (2 gummy= 5 g =1  serving)    High grade B-cell lymphoma (H)       cyclobenzaprine 10 MG tablet    FLEXERIL    30 tablet    Take 1 tablet (10 mg) by mouth 3 times daily as needed    High grade B-cell lymphoma (H)       dexamethasone 4 MG tablet    DECADRON    4 tablet    Take 2 tablets (8 mg) by mouth daily Please take on 1/2/18 and 1/3/18.    High grade B-cell lymphoma (H)       diazepam 5 MG tablet    VALIUM    90 tablet    Take 1 tablet (5 mg) by mouth 3 times daily as needed For muscle spasms    Chest wall pain       diclofenac 1 % Gel topical gel    VOLTAREN    100 g    Apply affected area two times daily PRN using enclosed dosing card.    Myofascial pain       EPINEPHrine 0.3 MG/0.3ML injection 2-pack    EPIPEN 2-CARIDAD    0.6 mL    Inject 0.3 mLs (0.3 mg) into the muscle once as needed for anaphylaxis        ferrous sulfate 325 (65 FE) MG tablet    IRON    90 tablet    Take 1 tablet (325 mg) by mouth 3 times daily (with meals)    Status post bariatric surgery       fluconazole 200 MG tablet    DIFLUCAN    30 tablet    Take 1 tablet (200 mg) by mouth daily    High grade B-cell lymphoma (H)       furosemide 20 MG tablet    LASIX    60 tablet    Take 1 tablet (20 mg) by mouth 2 times daily    Localized edema       hydrochlorothiazide 12.5 MG capsule    MICROZIDE    90 capsule    Take 1 capsule (12.5 mg) by mouth daily    Hypocalcemia       levofloxacin 250 MG tablet    LEVAQUIN    30 tablet    Take 1 tablet (250 mg) by mouth daily    High grade B-cell lymphoma (H)       * levothyroxine 112 MCG tablet    SYNTHROID/LEVOTHROID    189 tablet    Mon-Sat: (2 tabs daily) Sunday: 3 tabs    Postsurgical hypothyroidism, Papillary carcinoma, follicular variant (H), Postsurgical hypoparathyroidism (H), Thyroid cancer (H)       * levothyroxine 112 MCG tablet    SYNTHROID/LEVOTHROID    30 tablet    Take 3 tablets (336 mcg) by mouth once a week    High grade B-cell lymphoma (H)       lidocaine 5 % ointment    XYLOCAINE    350 g    Apply quarter  size amount to chest and back up to 3 times daily as needed for pain.    Chest wall pain       lidocaine visc 2% 2.5mL/5mL & maalox/mylanta w/ simeth 2.5mL/5mL & diphenhydrAMINE 5mg/5mL Susp suspension    [facility]    360 mL    Swish and spit 10 mLs in mouth every 6 hours as needed for mouth sores    Stomatitis and mucositis, High grade B-cell lymphoma (H)       lidocaine-prilocaine cream    EMLA    30 g    Apply topically as needed (port access pain.)    Neoplasm related pain, Chest wall pain       methocarbamol 750 MG tablet    ROBAXIN    360 tablet    Take 1 tablet (750 mg) by mouth 4 times daily as needed . Ok to take a 5th Robaxin on very severe days.    Myofascial pain       montelukast 10 MG tablet    SINGULAIR    60 tablet    Take 1 tablet (10 mg) by mouth 2 times daily    Idiopathic mast cell activation syndrome (H)       morphine 30 MG 12 hr tablet    MS CONTIN    120 tablet    Take 1 tablet (30 mg) by mouth every 8 hours . Take an addition pill at night such that your evening dose is 60mg    Neoplasm related pain       ondansetron 8 MG ODT tab    ZOFRAN-ODT    30 tablet    Take 1 tablet (8 mg) by mouth every 8 hours as needed    High grade B-cell lymphoma (H), Nausea and vomiting, intractability of vomiting not specified, unspecified vomiting type       * order for DME     2 Units    Equipment being ordered: compression stockings. 20-30 mm or 30 - 40 mm as patient can tolerate. Physical therapy to determine if they should be above or below the knee.    Venous stasis       * order for DME     2 Device    Equipment being ordered: compression bra    Malignant neoplasm of right female breast, unspecified site of breast       * order for DME     1 Units    Compression stockings, medium grade, please measure and fit. Please dispense a pair.    Peripheral edema       oxyCODONE IR 30 MG tablet    ROXICODONE    180 tablet    Take 1 tablet (30 mg) by mouth every 4 hours as needed for moderate to  severe pain    High grade B-cell lymphoma (H)       polyethylene glycol powder    MIRALAX    510 g    Take 17 g (1 capful) by mouth daily    Acute constipation       potassium chloride SA 20 MEQ CR tablet    KLOR-CON    90 tablet    Take 1 tablet (20 mEq) by mouth daily    Hypokalemia       PROBIOTIC DAILY PO      Take 1 capsule by mouth daily Lacto acid bifidobacterium    Breast cancer, unspecified laterality, Thyroid cancer (H), Chronic arthralgias of knees and hips, unspecified laterality       prochlorperazine 10 MG tablet    COMPAZINE     Take 10 mg by mouth as needed        ranitidine 75 MG tablet    ZANTAC    270 tablet    Take 1 tablet (75 mg) by mouth 3 times daily    Mast cell disease, systemic       senna-docusate 8.6-50 MG per tablet    SENOKOT-S;PERICOLACE    60 tablet    Take 1-2 tablets by mouth 2 times daily as needed for constipation    Acute constipation       SUMAtriptan 50 MG tablet    IMITREX     Take 50 mg by mouth as needed        triamcinolone 0.025 % ointment    KENALOG     Apply topically as needed        TUMS 500 MG chewable tablet   Generic drug:  calcium carbonate     180 chew tab    Take 2 tablets (1,000 mg) by mouth nightly as needed for other (cramps)        * UNABLE TO FIND      3 tablets 3 times daily MEDICATION NAME: calcium D-Glucarate  3 caps contain 180mg of elemental calcium.        * UNABLE TO FIND      Take 2 capsules by mouth 3 times daily Muscle Mag. 2 caps contain B1 20mg, B2 20mg, B6 10mg, magesium 20mg, manganese 2mg.        * UNABLE TO FIND      2 tablets 3 times daily MEDICATION NAME: Digestzymes    Thyroid cancer (H), Postsurgical hypothyroidism, Postsurgical hypoparathyroidism (H)       * UNABLE TO FIND      1 tablet daily MEDICATION NAME: Pure Encapsulations    Thyroid cancer (H), Postsurgical hypothyroidism, Postsurgical hypoparathyroidism (H)       VENTOLIN  (90 BASE) MCG/ACT Inhaler   Generic drug:  albuterol     18 g    INHALE 2 PUFFS INTO THE LUNGS EVERY  4 HOURS AS NEEDED FOR SHORTNESS OF BREATH OR DIFFICULT BREATHING OR WHEEZING    Mild intermittent asthma without complication       vitamin D3 2000 UNITS Caps     60 capsule    Take 5,000 Units by mouth daily Takes 2 tabs daily    Thyroid cancer (H), Postsurgical hypothyroidism, Papillary carcinoma, follicular variant (H), Postsurgical hypoparathyroidism (H)       * Notice:  This list has 9 medication(s) that are the same as other medications prescribed for you. Read the directions carefully, and ask your doctor or other care provider to review them with you.

## 2018-01-03 NOTE — NURSING NOTE
Chief Complaint   Patient presents with     Port Draw       Port accessed.  Labs collected from port.  Line flushed with NS and Heparin.  Pt tolerated procedure.  Checked in for next appt.    Ludy Dotson RN

## 2018-01-03 NOTE — NURSING NOTE
Patient presents to the [facility] infusion for Neulasta.  Order written by Dr. Sauceda was completed today. Name and  verified with patient. See MAR for medication details. Medication was given in the following site abdomen. Patient tolerated injection well and was discharged to home.  Suzie Orellana CMA

## 2018-01-03 NOTE — PROGRESS NOTES
"RN Care Coordination Note  [facility] RNCC post-hospitalization follow-up call  Assessment/Treatment Plan:   Pt discharged from [facility] on 1/1/18 , admitted for: chemo  - Reviewed Discharge summary  - Reviewed Transition Communication Hand-off for Care Transitions to Next Level of Care Provider    Problem:/Patient concerns:  When asked how pt feels he/she is doing since leaving the hospital, he/she states: \"My back and wrist are sore from my fall, other than that the admission didn't go as I wanted it to as usual.\"  Taking medications as prescribed:   Fever/chills: NO  Nausea/vomiting: NO  Diarrhea/constipation: NO  Abnormal signs of bleeding/bruising: NO  Pt voiced questions re:   Wanting refill on Zofran, no refill submitted as discussed at last visit. Reordered.  Wanting clarification of PET timing, as she thought it was going to happen in February, but is now scheduled for March.    Intervention:    -Reviewed upcoming appts: Pt had CBC and Neulasta today.  [facility] 1/05/18 9:30 AM UMP MASONIC LAB DRAW UC MASONIC LAB DRAW                 [facility] 1/09/18 6:30 AM UMP MASONIC LAB DRAW UC MASONIC LAB DRAW                 [facility] 1/09/18 7:00 AM  7:00 AM UMP ONC INFUSION 360 UC ONCOLOGY INFUSION  UC 12 ATC                 [facility] 1/12/18 9:00 AM UMP MASONIC LAB DRAW UC MASONIC LAB DRAW                 [facility] 1/12/18 9:30 AM UMP MATTI DEANSUKI CAMELIA SHARON                 [facility] 1/12/18 10:30 AM  10:30 AM UMP ONC INFUSION 360 UC ONCOLOGY INFUSION  UC 17 ATC                 [facility] 1/15/18 8:30 AM UMP MASONIC LAB DRAW UC MASONIC LAB DRAW                 [facility] 1/15/18 9:00 AM  9:00 AM UMP ONC INFUSION 360 UC ONCOLOGY INFUSION  UC 26 ATC     -Assisted pt in adjusting appts to better match tx schedule/toro and her work schedule : will move 1/12 SANJANA to 1/9, no SANJANA appts available this week. Encouraged her to review her other lab appts in [facility] and to call scheduling directly to adjust any lab appts if needed.  -Reminded pt to call the " [facility] Nurse Line [phone] if he/she experiences a temperature greater than or equal to 100.4, shaking chills,  uncontrolled nausea, vomiting and/or diarrhea, dizziness, shortness of breath, chest pain, bleeding, unexplained bruising, or if for any other new/concerning symptoms, questions or concerns.   - Explained to pt that I will review her request for chemo expedited on 1/18 admission with Rashida (to call ahead to inpt team) and will ask Dr. Sauceda to clarify timing of PET (inbasket message sent to provider)  Evaluation:  Pt voiced understanding of above instructions and information and denied further questions.    Laila Barrett, RN, BSN, OCN  Care Coordinator  [facility]

## 2018-01-05 DIAGNOSIS — C85.10 HIGH GRADE B-CELL LYMPHOMA (H): ICD-10-CM

## 2018-01-05 LAB
ANISOCYTOSIS BLD QL SMEAR: SLIGHT
BASOPHILS # BLD AUTO: 0 10E9/L (ref 0–0.2)
BASOPHILS NFR BLD AUTO: 0 %
DACRYOCYTES BLD QL SMEAR: SLIGHT
DIFFERENTIAL METHOD BLD: ABNORMAL
EOSINOPHIL # BLD AUTO: 0 10E9/L (ref 0–0.7)
EOSINOPHIL NFR BLD AUTO: 0 %
ERYTHROCYTE [DISTWIDTH] IN BLOOD BY AUTOMATED COUNT: 18.7 % (ref 10–15)
HCT VFR BLD AUTO: 30.6 % (ref 35–47)
HGB BLD-MCNC: 9.9 G/DL (ref 11.7–15.7)
LYMPHOCYTES # BLD AUTO: 0.1 10E9/L (ref 0.8–5.3)
LYMPHOCYTES NFR BLD AUTO: 0.9 %
MCH RBC QN AUTO: 33.6 PG (ref 26.5–33)
MCHC RBC AUTO-ENTMCNC: 32.4 G/DL (ref 31.5–36.5)
MCV RBC AUTO: 104 FL (ref 78–100)
MONOCYTES # BLD AUTO: 0 10E9/L (ref 0–1.3)
MONOCYTES NFR BLD AUTO: 0 %
NEUTROPHILS # BLD AUTO: 6.8 10E9/L (ref 1.6–8.3)
NEUTROPHILS NFR BLD AUTO: 99.1 %
OVALOCYTES BLD QL SMEAR: SLIGHT
PLATELET # BLD AUTO: 137 10E9/L (ref 150–450)
PLATELET # BLD EST: ABNORMAL 10*3/UL
POIKILOCYTOSIS BLD QL SMEAR: ABNORMAL
RBC # BLD AUTO: 2.95 10E12/L (ref 3.8–5.2)
WBC # BLD AUTO: 6.9 10E9/L (ref 4–11)

## 2018-01-05 PROCEDURE — 25000128 H RX IP 250 OP 636

## 2018-01-05 PROCEDURE — 85025 COMPLETE CBC W/AUTO DIFF WBC: CPT | Performed by: PHYSICIAN ASSISTANT

## 2018-01-05 RX ORDER — HEPARIN SODIUM (PORCINE) LOCK FLUSH IV SOLN 100 UNIT/ML 100 UNIT/ML
5 SOLUTION INTRAVENOUS EVERY 8 HOURS PRN
Status: DISCONTINUED | OUTPATIENT
Start: 2018-01-05 | End: 2018-01-07 | Stop reason: HOSPADM

## 2018-01-05 RX ORDER — LIDOCAINE/PRILOCAINE 2.5 %-2.5%
CREAM (GRAM) TOPICAL PRN
Qty: 30 G | Refills: 1 | Status: SHIPPED | OUTPATIENT
Start: 2018-01-05 | End: 2018-01-24

## 2018-01-05 RX ADMIN — SODIUM CHLORIDE, PRESERVATIVE FREE 5 ML: 5 INJECTION INTRAVENOUS at 09:50

## 2018-01-05 NOTE — TELEPHONE ENCOUNTER
Message from [facility]:  Radha Gonzalez RN Thu Jan 4, 2018 5:13 PM     From: IMMANUEL LOVETT   Sent: Thu Jan 4, 2018 5:13 PM   To: Edu Benitez MD  Subject: FW: Medication Renewal Request  Any other cream/gel ideas? I will work on voltaren PA.   ----- Message -----   From: Immanuel Lovett   Sent: 1/4/2018 4:37 PM   [facility] [place] [place]  Subject: RE: Medication Renewal Request     They told me it was going to be $700+, their has to be something that is affordable than this. I know you are trying but they aren't even paying for the voltaren gel. the last cream I got in the hospital was less than an ounce I use this cream on my chest and back.  ----- Message -----  From: Radha GAMBOA  Sent: 1/4/2018 10:11 AM CST  To: Elvin Lovett  Subject: RE: Medication Renewal Request    Hi Elvin -     Thanks for your message - you have a current compound prescription on file at the [facility]. I called them for you and asked them to reach out to you about filling it and the price. They said they would be calling you today, I think they want to make sure you are okay with the price before they start making it. Just in case you miss their call, the phone # to reach them is [phone].    Hope that helps!  Radha Gonzalez RN  Palliative Care   [phone]      ----- Message -----   From: Elvin Lovett   Sent: 1/3/2018 1:46 PM CST   To: Edu Benitez MD  Subject: Medication Renewal Request    Original authorizing provider: MD Elvin Rangel would like a refill of the following medications:  COMPOUND CONTAINING CONTROLLED SUBSTANCE (CMPD RX) - PHARMACY TO MIX COMPOUNDED MEDICATION [Edu Benitez MD]    Preferred pharmacy: [id number] - [place], [place] - [address] [address]    Comment:  Dr. Benitez, Please order the ketamine, Lidocaine and I will play the difference.    Medication renewals requested in this message routed to  other providers:  lidocaine-prilocaine (EMLA) cream [COTY Chowdary]

## 2018-01-05 NOTE — NURSING NOTE
Chief Complaint   Patient presents with     Port Draw     Labs drawn via port by RN, line flushed and hep locked then deaccessed     Ila Navarro RN

## 2018-01-06 NOTE — TELEPHONE ENCOUNTER
Message from [employer]:  Original authorizing provider: COTY Chowdary would like a refill of the following medications:  lidocaine-prilocaine (EMLA) cream [COTY Chowdary]    Preferred pharmacy: [id number] - [place], [place] - [address] [address] [facility] [address]    Comment:  Dr. Benitez, Please order the ketamine, Lidocaine and I will play the difference.    Medication renewals requested in this message routed to other providers:  COMPOUND CONTAINING CONTROLLED SUBSTANCE (CMPD RX) - PHARMACY TO MIX COMPOUNDED MEDICATION [Edu Benitez MD]

## 2018-01-08 DIAGNOSIS — C85.10 HIGH GRADE B-CELL LYMPHOMA (H): Primary | ICD-10-CM

## 2018-01-09 ENCOUNTER — CARE COORDINATION (OUTPATIENT)
Dept: ONCOLOGY | Facility: CLINIC | Age: 58
End: 2018-01-09

## 2018-01-09 ENCOUNTER — RECORDS - HEALTHEAST (OUTPATIENT)
Dept: ADMINISTRATIVE | Facility: OTHER | Age: 58
End: 2018-01-09

## 2018-01-09 ENCOUNTER — ONCOLOGY VISIT (OUTPATIENT)
Dept: ONCOLOGY | Facility: CLINIC | Age: 58
End: 2018-01-09
Attending: PHYSICIAN ASSISTANT
Payer: COMMERCIAL

## 2018-01-09 VITALS
HEART RATE: 104 BPM | HEIGHT: 65 IN | TEMPERATURE: 98.1 F | SYSTOLIC BLOOD PRESSURE: 109 MMHG | BODY MASS INDEX: 30.24 KG/M2 | OXYGEN SATURATION: 99 % | RESPIRATION RATE: 18 BRPM | DIASTOLIC BLOOD PRESSURE: 69 MMHG | WEIGHT: 181.5 LBS

## 2018-01-09 DIAGNOSIS — E87.6 HYPOKALEMIA: ICD-10-CM

## 2018-01-09 DIAGNOSIS — C85.10 HIGH GRADE B-CELL LYMPHOMA (H): ICD-10-CM

## 2018-01-09 LAB
ABO + RH BLD: NORMAL
ABO + RH BLD: NORMAL
ALBUMIN SERPL-MCNC: 2.8 G/DL (ref 3.4–5)
ALBUMIN UR-MCNC: 30 MG/DL
ALP SERPL-CCNC: 73 U/L (ref 40–150)
ALT SERPL W P-5'-P-CCNC: 32 U/L (ref 0–50)
ANION GAP SERPL CALCULATED.3IONS-SCNC: 8 MMOL/L (ref 3–14)
ANISOCYTOSIS BLD QL SMEAR: SLIGHT
APPEARANCE UR: ABNORMAL
AST SERPL W P-5'-P-CCNC: 14 U/L (ref 0–45)
BACTERIA #/AREA URNS HPF: ABNORMAL /HPF
BASOPHILS # BLD AUTO: 0 10E9/L (ref 0–0.2)
BASOPHILS NFR BLD AUTO: 1.2 %
BILIRUB SERPL-MCNC: 0.5 MG/DL (ref 0.2–1.3)
BILIRUB UR QL STRIP: ABNORMAL
BLD GP AB SCN SERPL QL: NORMAL
BLD PROD TYP BPU: NORMAL
BLOOD BANK CMNT PATIENT-IMP: NORMAL
BUN SERPL-MCNC: 15 MG/DL (ref 7–30)
CALCIUM SERPL-MCNC: 7.3 MG/DL (ref 8.5–10.1)
CHLORIDE SERPL-SCNC: 95 MMOL/L (ref 94–109)
CO2 SERPL-SCNC: 31 MMOL/L (ref 20–32)
COLOR UR AUTO: ABNORMAL
CREAT SERPL-MCNC: 0.72 MG/DL (ref 0.52–1.04)
DACRYOCYTES BLD QL SMEAR: SLIGHT
DIFFERENTIAL METHOD BLD: ABNORMAL
EOSINOPHIL # BLD AUTO: 0 10E9/L (ref 0–0.7)
EOSINOPHIL NFR BLD AUTO: 0 %
ERYTHROCYTE [DISTWIDTH] IN BLOOD BY AUTOMATED COUNT: 17 % (ref 10–15)
GFR SERPL CREATININE-BSD FRML MDRD: 83 ML/MIN/1.7M2
GLUCOSE SERPL-MCNC: 133 MG/DL (ref 70–99)
GLUCOSE UR STRIP-MCNC: NEGATIVE MG/DL
HCT VFR BLD AUTO: 24.1 % (ref 35–47)
HGB BLD-MCNC: 8.2 G/DL (ref 11.7–15.7)
HGB UR QL STRIP: NEGATIVE
KETONES UR STRIP-MCNC: 5 MG/DL
LDH SERPL L TO P-CCNC: 165 U/L (ref 81–234)
LEUKOCYTE ESTERASE UR QL STRIP: NEGATIVE
LYMPHOCYTES # BLD AUTO: 0.2 10E9/L (ref 0.8–5.3)
LYMPHOCYTES NFR BLD AUTO: 19 %
MCH RBC QN AUTO: 33.5 PG (ref 26.5–33)
MCHC RBC AUTO-ENTMCNC: 34 G/DL (ref 31.5–36.5)
MCV RBC AUTO: 98 FL (ref 78–100)
MONOCYTES # BLD AUTO: 0.1 10E9/L (ref 0–1.3)
MONOCYTES NFR BLD AUTO: 13.1 %
MUCOUS THREADS #/AREA URNS LPF: PRESENT /LPF
NEUTROPHILS # BLD AUTO: 0.5 10E9/L (ref 1.6–8.3)
NEUTROPHILS NFR BLD AUTO: 66.7 %
NITRATE UR QL: NEGATIVE
NRBC # BLD AUTO: 0 10*3/UL
NRBC BLD AUTO-RTO: 4 /100
NUM BPU REQUESTED: 2
PH UR STRIP: 5 PH (ref 5–7)
PLATELET # BLD AUTO: 54 10E9/L (ref 150–450)
POIKILOCYTOSIS BLD QL SMEAR: SLIGHT
POLYCHROMASIA BLD QL SMEAR: SLIGHT
POTASSIUM SERPL-SCNC: 3.2 MMOL/L (ref 3.4–5.3)
PROT SERPL-MCNC: 6.2 G/DL (ref 6.8–8.8)
RBC # BLD AUTO: 2.45 10E12/L (ref 3.8–5.2)
RBC #/AREA URNS AUTO: 2 /HPF (ref 0–2)
SODIUM SERPL-SCNC: 134 MMOL/L (ref 133–144)
SOURCE: ABNORMAL
SP GR UR STRIP: 1.03 (ref 1–1.03)
SPECIMEN EXP DATE BLD: NORMAL
SQUAMOUS #/AREA URNS AUTO: <1 /HPF (ref 0–1)
URATE SERPL-MCNC: 4.9 MG/DL (ref 2.6–6)
UROBILINOGEN UR STRIP-MCNC: 4 MG/DL (ref 0–2)
WBC # BLD AUTO: 0.8 10E9/L (ref 4–11)
WBC #/AREA URNS AUTO: 3 /HPF (ref 0–2)

## 2018-01-09 PROCEDURE — 99214 OFFICE O/P EST MOD 30 MIN: CPT | Mod: ZP | Performed by: PHYSICIAN ASSISTANT

## 2018-01-09 PROCEDURE — 86901 BLOOD TYPING SEROLOGIC RH(D): CPT | Performed by: PHYSICIAN ASSISTANT

## 2018-01-09 PROCEDURE — 85025 COMPLETE CBC W/AUTO DIFF WBC: CPT | Performed by: PHYSICIAN ASSISTANT

## 2018-01-09 PROCEDURE — 86850 RBC ANTIBODY SCREEN: CPT | Performed by: PHYSICIAN ASSISTANT

## 2018-01-09 PROCEDURE — 80053 COMPREHEN METABOLIC PANEL: CPT | Performed by: PHYSICIAN ASSISTANT

## 2018-01-09 PROCEDURE — 83615 LACTATE (LD) (LDH) ENZYME: CPT | Performed by: PHYSICIAN ASSISTANT

## 2018-01-09 PROCEDURE — G0463 HOSPITAL OUTPT CLINIC VISIT: HCPCS | Mod: ZF

## 2018-01-09 PROCEDURE — 86900 BLOOD TYPING SEROLOGIC ABO: CPT | Performed by: PHYSICIAN ASSISTANT

## 2018-01-09 PROCEDURE — 86923 COMPATIBILITY TEST ELECTRIC: CPT | Performed by: PHYSICIAN ASSISTANT

## 2018-01-09 PROCEDURE — 25000128 H RX IP 250 OP 636: Mod: ZF | Performed by: PHYSICIAN ASSISTANT

## 2018-01-09 PROCEDURE — 36591 DRAW BLOOD OFF VENOUS DEVICE: CPT

## 2018-01-09 PROCEDURE — 81001 URINALYSIS AUTO W/SCOPE: CPT | Performed by: PHYSICIAN ASSISTANT

## 2018-01-09 PROCEDURE — 84550 ASSAY OF BLOOD/URIC ACID: CPT | Performed by: PHYSICIAN ASSISTANT

## 2018-01-09 RX ORDER — HEPARIN SODIUM (PORCINE) LOCK FLUSH IV SOLN 100 UNIT/ML 100 UNIT/ML
5 SOLUTION INTRAVENOUS DAILY PRN
Status: DISCONTINUED | OUTPATIENT
Start: 2018-01-09 | End: 2018-01-09 | Stop reason: HOSPADM

## 2018-01-09 RX ORDER — POTASSIUM CHLORIDE 1500 MG/1
20 TABLET, EXTENDED RELEASE ORAL DAILY
Qty: 90 TABLET | Refills: 3 | Status: ON HOLD | OUTPATIENT
Start: 2018-01-09 | End: 2018-01-22

## 2018-01-09 RX ADMIN — SODIUM CHLORIDE, PRESERVATIVE FREE 5 ML: 5 INJECTION INTRAVENOUS at 06:54

## 2018-01-09 NOTE — NURSING NOTE
"Oncology Rooming Note    January 9, 2018 7:15 AM   Tisha Arias is a 57 year old female who presents for:    Chief Complaint   Patient presents with     Port Draw     Oncology Clinic Visit     Return: Lymphoma     Initial Vitals: /69  Pulse 104  Temp 98.1  F (36.7  C)  Resp 18  Wt 82.3 kg (181 lb 8 oz)  SpO2 99%  BMI 30.2 kg/m2 Estimated body mass index is 30.2 kg/(m^2) as calculated from the following:    Height as of 12/28/17: 1.651 m (5' 5\").    Weight as of this encounter: 82.3 kg (181 lb 8 oz). Body surface area is 1.94 meters squared.  Data Unavailable Comment: 8 before meds-   No LMP recorded. Patient has had a hysterectomy.  Allergies reviewed: Yes  Medications reviewed: Yes    Medications: Medication refills not needed today.  Pharmacy name entered into ROAM Data: [id number] - [place], [place] - [address] [address]    Clinical concerns: No New Concerns    5 minutes for nursing intake (face to face time)     SUGAR Gao      "

## 2018-01-09 NOTE — LETTER
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 She is currently undergoing treatment with DA-R-EPOCH. Her past medical history is significant for breast cancer in 2011 s/p bilateral mastectomies and BENJIE/BSO up until recently on Tamoxifen, papillary thyroid cancer s/p total thyroidectomy, chronic chest wall pain, and asthma. Briefly, her oncologic history is as follows:    She presented in 8/2017 with dramatically worse chest and back pain. CT 9/1/17 showed lytic lesion right 10th rib extending to right T9-10 neural foramen with vertebral body invasion. There was associated conglomerate of lymphadenopathy around the mid T-spine. She had a CT guided biopsy showing B-cell high grade lymphoma. Pathology showed \"The morphology shows a population of large cells, diffuse lymphoid infiltrate. The cells are atypical with abundant mitotic and apoptotic activity and single cell necrosis. Tumor is CD45 and CD79a positive and focally weakly CD30 positive. The other stains revealed positivity for CD20, BCL6, BCL2 and MUM1, and CD10 and CD21 negative. The CD5 and CD3 highlighted background T-cells.  MYC expression was equivocal with approximately 40% nuclei staining.  Ki-67 proliferative index is greater than 90-95%. The immunohistochemistry is suggestive of non-GCB immunophenotype of diffuse large B-cell lymphoma. The cytogenetics did not show rearrangement of the BCL2 or c-MYC. There is the presence of BCL6 rearrangement in 78% of cells and gains of MYC and BCL2, but no amplifications.\"     Staging LP and BMBx were negative for lymphoma. She started DA-REPOCH 10/6/17. She did well with chemo other than rigors during CIVI. D/c 10/11/17. Given Neulasta 10/12/17. Post cycle 1 complicated " "by mucositis and worsening of chronic LE peripheral edema. She began cycle 2 on 10/27/17. Due to a rise in her LD and uric acid levels on that admission day, she underwent a CT scan which showed response to therapy with improvement in her mediastinal lymphadenopathy and right chest wall mass. She had a CR following 4 cycles on PET/CT 12/21.     Interval History:  Elvin is here for follow-up after cycle 5 of chemotherapy. She has felt more tired the past 2 cycles with orthostatic dizziness. Fell while up with IV pole in the hospital. No falls since. This has seemed worse the last few days. She has SOB with stairs otherwise is ambulating okay. No SOB at rest or palpitations. Her chronic post-mastectomy pain seems worse but this is not related to exertion. She denies any mucositis this past cycle. Nausea is well controlled on zofran TID. Bowels are moving well with fiber con. Urination is okay other than feels \"hot\" when it passes. No burning, bleeding, or odor. No fevers/chills, cough, congestion, abdominal pain. Edema is better. She is drinking 8 c of fluids per day and eating fairly well though less. Missed her potassium dose today. Still trying to work as much as she can. ROS otherwise negative.     Current Outpatient Prescriptions   Medication Sig Dispense Refill     potassium chloride SA (KLOR-CON) 20 MEQ CR tablet Take 1 tablet (20 mEq) by mouth daily 90 tablet 3     lidocaine-prilocaine (EMLA) cream Apply topically as needed (port access pain.) 30 g 1     ondansetron (ZOFRAN-ODT) 8 MG ODT tab Take 1 tablet (8 mg) by mouth every 8 hours as needed 30 tablet 1     dexamethasone (DECADRON) 4 MG tablet Take 2 tablets (8 mg) by mouth daily Please take on 1/2/18 and 1/3/18. 4 tablet 0     diazepam (VALIUM) 5 MG tablet Take 1 tablet (5 mg) by mouth 3 times daily as needed For muscle spasms 90 tablet 2     morphine (MS CONTIN) 30 MG 12 hr tablet Take 1 tablet (30 mg) by mouth every 8 hours . Take an addition pill at " night such that your evening dose is 60mg 120 tablet 0     oxyCODONE IR (ROXICODONE) 30 MG tablet Take 1 tablet (30 mg) by mouth every 4 hours as needed for moderate to severe pain 180 tablet 0     prochlorperazine (COMPAZINE) 10 MG tablet Take 10 mg by mouth as needed  3     SUMAtriptan (IMITREX) 50 MG tablet Take 50 mg by mouth as needed  3     cromolyn sodium (NASALCROM) 5.2 MG/ACT AERS Inhaler SPRAY ONE SPRAY( 1 ML) IN NOSTRIL DAILY 1 Bottle 6     acetaminophen (TYLENOL) 325 MG tablet Take 2 tablets (650 mg) by mouth every 4 hours as needed for mild pain or fever 100 tablet      fluconazole (DIFLUCAN) 200 MG tablet Take 1 tablet (200 mg) by mouth daily 30 tablet 0     cetirizine (ZYRTEC ALLERGY) 10 MG tablet Take 1 tablet (10 mg) by mouth 3 times daily On hold for lab test. 30 tablet 0     acyclovir (ZOVIRAX) 400 MG tablet Take 1 tablet (400 mg) by mouth 2 times daily 60 tablet 0     diclofenac (VOLTAREN) 1 % GEL topical gel Apply affected area two times daily PRN using enclosed dosing card. 100 g 0     levofloxacin (LEVAQUIN) 250 MG tablet Take 1 tablet (250 mg) by mouth daily 30 tablet 0     allopurinol (ZYLOPRIM) 300 MG tablet Take 1 tablet (300 mg) by mouth daily 30 tablet 0     ferrous sulfate (IRON) 325 (65 FE) MG tablet Take 1 tablet (325 mg) by mouth 3 times daily (with meals) 90 tablet 0     cyclobenzaprine (FLEXERIL) 10 MG tablet Take 1 tablet (10 mg) by mouth 3 times daily as needed 30 tablet 0     levothyroxine (SYNTHROID/LEVOTHROID) 112 MCG tablet Take 3 tablets (336 mcg) by mouth once a week 30 tablet      [employer] FIBER GUMMY BEARS CHILDREN CHEW Take 5 g by mouth daily (2 gummy= 5 g =1 serving)       triamcinolone (KENALOG) 0.025 % ointment Apply topically as needed  3     lidocaine (XYLOCAINE) 5 % ointment Apply quarter size amount to chest and back up to 3 times daily as needed for pain. 350 g 1     montelukast (SINGULAIR) 10 MG tablet Take 1 tablet (10 mg) by mouth 2 times daily (Patient taking  differently: Take 20 mg by mouth 2 times daily ) 60 tablet 0     furosemide (LASIX) 20 MG tablet Take 1 tablet (20 mg) by mouth 2 times daily 60 tablet 0     magic mouthwash suspension (diphenhydrAMINE, lidocaine, aluminum-magnesium & simethicone) Swish and spit 10 mLs in mouth every 6 hours as needed for mouth sores 360 mL 1     bisacodyl (DULCOLAX) 5 MG EC tablet Take 3 tablets (15 mg) by mouth daily as needed for constipation 30 tablet 0     polyethylene glycol (MIRALAX) powder Take 17 g (1 capful) by mouth daily 510 g 0     senna-docusate (SENOKOT-S;PERICOLACE) 8.6-50 MG per tablet Take 1-2 tablets by mouth 2 times daily as needed for constipation 60 tablet 0     UNABLE TO FIND Take 2 capsules by mouth 3 times daily Muscle Mag. 2 caps contain B1 20mg, B2 20mg, B6 10mg, magesium 20mg, manganese 2mg.       calcitRIOL (ROCALTROL) 0.25 MCG capsule TAKE 2 CAPSULES BY MOUTH EVERY MORNING AND TAKE 1 CAPSULE BY MOUTH EVERY NIGHT AT BEDTIME (Patient taking differently: TAKE 2 CAPSULES BY MOUTH EVERY MORNING AND TAKE 1 CAPSULE BY MOUTH at noon) 270 capsule 3     celecoxib (CELEBREX) 200 MG capsule TAKE ONE CAPSULE BY MOUTH TWICE DAILY  56 capsule 0     methocarbamol (ROBAXIN) 750 MG tablet Take 1 tablet (750 mg) by mouth 4 times daily as needed . Ok to take a 5th Robaxin on very severe days. (Patient taking differently: Take 750 mg by mouth 4 times daily . Ok to take a 5th Robaxin on very severe days.) 360 tablet 1     levothyroxine (SYNTHROID/LEVOTHROID) 112 MCG tablet Mon-Sat: (2 tabs daily) Sunday: 3 tabs (Patient taking differently: 112 mcg Mon-Sat: (2 tabs daily) Sunday: 3 tabs) 189 tablet 3     hydrochlorothiazide (MICROZIDE) 12.5 MG capsule Take 1 capsule (12.5 mg) by mouth daily 90 capsule 2     cromolyn (OPTICROM) 4 % ophthalmic solution Place 1 drop into both eyes 4 times daily 10 mL 5     Cholecalciferol (VITAMIN D3) 2000 UNITS CAPS Take 5,000 Units by mouth daily Takes 2 tabs daily 60 capsule 4     ranitidine  (ZANTAC) 75 MG tablet Take 1 tablet (75 mg) by mouth 3 times daily 270 tablet 3     aluminum chloride (DRYSOL) 20 % external solution Apply topically At Bedtime 60 mL 3     calcium citrate-vitamin D (CALCIUM CITRATE +D) 315-250 MG-UNIT TABS Take 2 tablets by mouth twice a week  120 tablet      calcium carbonate (TUMS) 500 MG chewable tablet Take 2 tablets (1,000 mg) by mouth nightly as needed for other (cramps) 180 chew tab 3     UNABLE TO FIND 3 tablets 3 times daily MEDICATION NAME: calcium D-Glucarate   3 caps contain 180mg of elemental calcium.       Triamcinolone Acetonide (AZMACORT IN) Inhale 2 puffs into the lungs as needed       UNABLE TO FIND 2 tablets 3 times daily MEDICATION NAME: Digestzymes        UNABLE TO FIND 1 tablet daily MEDICATION NAME: Pure Encapsulations       Probiotic Product (PROBIOTIC DAILY PO) Take 1 capsule by mouth daily Lacto acid bifidobacterium       order for DME Compression stockings, medium grade, please measure and fit. Please dispense a pair. (Patient not taking: Reported on 12/13/2017) 1 Units 0     COMPOUND CONTAINING CONTROLLED SUBSTANCE (CMPD RX) - PHARMACY TO MIX COMPOUNDED MEDICATION Apply small amount to affected area two times daily. (Patient not taking: Reported on 11/27/2017) 120 g 6     EPINEPHrine (EPIPEN 2-CARIDAD) 0.3 MG/0.3ML injection Inject 0.3 mLs (0.3 mg) into the muscle once as needed for anaphylaxis (Patient not taking: Reported on 12/7/2017) 0.6 mL 3     VENTOLIN  (90 BASE) MCG/ACT Inhaler INHALE 2 PUFFS INTO THE LUNGS EVERY 4 HOURS AS NEEDED FOR SHORTNESS OF BREATH OR DIFFICULT BREATHING OR WHEEZING (Patient not taking: Reported on 12/7/2017) 18 g 3     order for DME Equipment being ordered: compression stockings. 20-30 mm or 30 - 40 mm as patient can tolerate. Physical therapy to determine if they should be above or below the knee. (Patient not taking: Reported on 12/13/2017) 2 Units 4     order for DME Equipment being ordered: compression bra (Patient  "not taking: Reported on 12/7/2017) 2 Device 1     Physical Examination:  /69  Pulse 104  Temp 98.1  F (36.7  C)  Resp 18  Ht 1.651 m (5' 5\")  Wt 82.3 kg (181 lb 8 oz)  SpO2 99%  BMI 30.2 kg/m2  Wt Readings from Last 10 Encounters:   01/09/18 82.3 kg (181 lb 8 oz)   01/03/18 85.7 kg (189 lb)   01/01/18 87.5 kg (193 lb)   12/26/17 85.5 kg (188 lb 7.9 oz)   12/19/17 83.8 kg (184 lb 11.2 oz)   12/15/17 84.9 kg (187 lb 3.2 oz)   12/13/17 88.2 kg (194 lb 6.4 oz)   12/11/17 88.4 kg (194 lb 14.4 oz)   12/07/17 86.3 kg (190 lb 4.8 oz)   11/28/17 84.3 kg (185 lb 12.8 oz)   Constitutional: Well-appearing female in no acute distress. Appears tired.  Eyes: EOMI, PERRL. No scleral icterus.  ENT: Oral mucosa is moist without lesions or thrush.   Lymphatic: Neck is supple without cervical or supraclavicular lymphadenopathy.   Cardiovascular: Regular rate and rhythm   Respiratory: Clear to auscultation bilaterally. No wheezes or crackles.  Gastrointestinal: Bowel sounds present. Abdomen soft, nontender.  No masses palpated.   Neurologic: Cranial nerves II through XII are intact.   Skin: Skin on fingertips and the bottoms of her feet is dry and mildly erythematous.  Port site is unremarkable  Extremities: 1+ lower extremity edema bilaterally. Wearing compression stockings     Laboratory Data:   1/9/2018 07:03   Sodium 134   Potassium 3.2 (L)   Chloride 95   Carbon Dioxide 31   Urea Nitrogen 15   Creatinine 0.72   GFR Estimate 83   GFR Estimate If Black >90   Calcium 7.3 (L)   Anion Gap 8   Albumin 2.8 (L)   Protein Total PENDING   Bilirubin Total 0.5   Alkaline Phosphatase 73   ALT 32   AST 14   Lactate Dehydrogenase 165   Uric Acid 4.9   Glucose 133 (H)   WBC 0.8 (LL)   Hemoglobin 8.2 (L)   Hematocrit 24.1 (L)   Platelet Count 54 (L)   RBC Count 2.45 (L)   MCV 98   MCH 33.5 (H)   MCHC 34.0   RDW 17.0 (H)   Diff Method Manual Differential   % Neutrophils 66.7   % Lymphocytes 19.0   % Monocytes 13.1   % Eosinophils 0.0 " "  % Basophils 1.2   Nucleated RBCs 4 (H)   Absolute Neutrophil 0.5 (L)   Absolute Lymphocytes 0.2 (L)   Absolute Monocytes 0.1   Absolute Eosinophils 0.0   Absolute Basophils 0.0   Absolute Nucleated RBC 0.0   Anisocytosis Slight   Poikilocytosis Slight   Polychromasia Slight   Teardrop Cells Slight       Assessment and Plan:    1. DLBCL, \"double-hit\"-like, currently on treatment with DA-R-EPOCH  Now s/p 5 cycles with CR on PET/CT after 4. Most recent cycle was not dose escalated due to toro ANC of 300. She overall tolerated well though has more fatigue and chemo brain and mild GI symptoms controlled with meds. Her counts are nadiring today. Neulasta was given on 1/2. Follow-up with me on 1/18 prior to cycle 6 admission.     2. Edema: Peripheral edema overall improving. Continue to wear compression stockings. Lasix 20 mg daily and KCl 20 mEq daily.     3. Heme: counts downtrending  No transfusion needs today but will move up transfusion given Hgb 8.2 and some symptoms. Continue twice weekly checks and transfuse if Hgb<8 and Plt<10k.     4. ID:  without active infectious concerns   Continue ppx ACV 400mg BID. Levaquin 250mg daily and fluconazole 200mg daily    5. History of stage 1, ER/SD+, HER2- breast cancer s/p bilateral mastectomies and BENJIE/BSO  Follows with Dr. Crawley. Oncotype recurrence score 11%. Was initially on Arimidex ([id number]) but changed to Tamoxifen due to arthralgias. Tamoxifen held since 10/5 and continue to hold with chemotherapy. Last f/u Dr. Crawley was on 11/27/17.    6. History of papillary thyroid cancer, s/p total thyroidectomy  Follows with Dr. Castro. Has post surgical hypothyroidism. Continue levothyroxine and calcitriol as prescribed. Neck u/s on 11/2 shows no evidence of disease.    7. History of Rachael en Y gastric bypass  Continue supplements (calcium citrate-vitamin D, vitamin D3, magnesium citrate). Also has iron deficiency anemia likely from poor absorption and per Dr." Sohail, she should continue 325mg PO ferrous sulfate TID.     8. Chronic chest wall pain s/p mastectomies  Follows with Dr. Benitez. Palliative care to continue to prescribe pain medications. Taking MS Contin 30/30/60 mg tid and oxycodone prn, currently 15 mg qid for breakthrough pain, and celebrex. She did appear more sedated today.     10. Constipation.  Under control with Colace, fiber tabs, and MiraLax when needed, which she will continue.     11. Urinary symptoms  Will have her leave  to further evaluate.      Rashida Jaffe PA-C    [facility]  [address] [place] [id number]  [phone]                COTY Jessica

## 2018-01-09 NOTE — PROGRESS NOTES
Spoke with Elvin to let her know that her infusion appt for PRBCs was scheduled for tomorrrow a.m. (0630 lab/ 0700 infusion) as per Rashida's request this morning.  Also reviewed with her the content of the 1/8/17 [employer] message I sent re: the PET scan timing, as she has not read it yet.  Pt voiced understanding of above instructions and information and denied further questions

## 2018-01-09 NOTE — LETTER
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 She is currently undergoing treatment with DA-R-EPOCH. Her past medical history is significant for breast cancer in 2011 s/p bilateral mastectomies and BENJIE/BSO up until recently on Tamoxifen, papillary thyroid cancer s/p total thyroidectomy, chronic chest wall pain, and asthma. Briefly, her oncologic history is as follows:    She presented in 8/2017 with dramatically worse chest and back pain. CT 9/1/17 showed lytic lesion right 10th rib extending to right T9-10 neural foramen with vertebral body invasion. There was associated conglomerate of lymphadenopathy around the mid T-spine. She had a CT guided biopsy showing B-cell high grade lymphoma. Pathology showed \"The morphology shows a population of large cells, diffuse lymphoid infiltrate. The cells are atypical with abundant mitotic and apoptotic activity and single cell necrosis. Tumor is CD45 and CD79a positive and focally weakly CD30 positive. The other stains revealed positivity for CD20, BCL6, BCL2 and MUM1, and CD10 and CD21 negative. The CD5 and CD3 highlighted background T-cells.  MYC expression was equivocal with approximately 40% nuclei staining.  Ki-67 proliferative index is greater than 90-95%. The immunohistochemistry is suggestive of non-GCB immunophenotype of diffuse large B-cell lymphoma. The cytogenetics did not show rearrangement of the BCL2 or c-MYC. There is the presence of BCL6 rearrangement in 78% of cells and gains of MYC and BCL2, but no amplifications.\"     Staging LP and BMBx were negative for lymphoma. She started DA-REPOCH 10/6/17. She did well with chemo other than rigors during CIVI. D/c 10/11/17. Given Neulasta 10/12/17. Post cycle 1 complicated " "by mucositis and worsening of chronic LE peripheral edema. She began cycle 2 on 10/27/17. Due to a rise in her LD and uric acid levels on that admission day, she underwent a CT scan which showed response to therapy with improvement in her mediastinal lymphadenopathy and right chest wall mass. She had a CR following 4 cycles on PET/CT 12/21.     Interval History:  Elvin is here for follow-up after cycle 5 of chemotherapy. She has felt more tired the past 2 cycles with orthostatic dizziness. Fell while up with IV pole in the hospital. No falls since. This has seemed worse the last few days. She has SOB with stairs otherwise is ambulating okay. No SOB at rest or palpitations. Her chronic post-mastectomy pain seems worse but this is not related to exertion. She denies any mucositis this past cycle. Nausea is well controlled on zofran TID. Bowels are moving well with fiber con. Urination is okay other than feels \"hot\" when it passes. No burning, bleeding, or odor. No fevers/chills, cough, congestion, abdominal pain. Edema is better. She is drinking 8 c of fluids per day and eating fairly well though less. Missed her potassium dose today. Still trying to work as much as she can. ROS otherwise negative.     Current Outpatient Prescriptions   Medication Sig Dispense Refill     potassium chloride SA (KLOR-CON) 20 MEQ CR tablet Take 1 tablet (20 mEq) by mouth daily 90 tablet 3     lidocaine-prilocaine (EMLA) cream Apply topically as needed (port access pain.) 30 g 1     ondansetron (ZOFRAN-ODT) 8 MG ODT tab Take 1 tablet (8 mg) by mouth every 8 hours as needed 30 tablet 1     dexamethasone (DECADRON) 4 MG tablet Take 2 tablets (8 mg) by mouth daily Please take on 1/2/18 and 1/3/18. 4 tablet 0     diazepam (VALIUM) 5 MG tablet Take 1 tablet (5 mg) by mouth 3 times daily as needed For muscle spasms 90 tablet 2     morphine (MS CONTIN) 30 MG 12 hr tablet Take 1 tablet (30 mg) by mouth every 8 hours . Take an addition pill at " night such that your evening dose is 60mg 120 tablet 0     oxyCODONE IR (ROXICODONE) 30 MG tablet Take 1 tablet (30 mg) by mouth every 4 hours as needed for moderate to severe pain 180 tablet 0     prochlorperazine (COMPAZINE) 10 MG tablet Take 10 mg by mouth as needed  3     SUMAtriptan (IMITREX) 50 MG tablet Take 50 mg by mouth as needed  3     cromolyn sodium (NASALCROM) 5.2 MG/ACT AERS Inhaler SPRAY ONE SPRAY( 1 ML) IN NOSTRIL DAILY 1 Bottle 6     acetaminophen (TYLENOL) 325 MG tablet Take 2 tablets (650 mg) by mouth every 4 hours as needed for mild pain or fever 100 tablet      fluconazole (DIFLUCAN) 200 MG tablet Take 1 tablet (200 mg) by mouth daily 30 tablet 0     cetirizine (ZYRTEC ALLERGY) 10 MG tablet Take 1 tablet (10 mg) by mouth 3 times daily On hold for lab test. 30 tablet 0     acyclovir (ZOVIRAX) 400 MG tablet Take 1 tablet (400 mg) by mouth 2 times daily 60 tablet 0     diclofenac (VOLTAREN) 1 % GEL topical gel Apply affected area two times daily PRN using enclosed dosing card. 100 g 0     levofloxacin (LEVAQUIN) 250 MG tablet Take 1 tablet (250 mg) by mouth daily 30 tablet 0     allopurinol (ZYLOPRIM) 300 MG tablet Take 1 tablet (300 mg) by mouth daily 30 tablet 0     ferrous sulfate (IRON) 325 (65 FE) MG tablet Take 1 tablet (325 mg) by mouth 3 times daily (with meals) 90 tablet 0     cyclobenzaprine (FLEXERIL) 10 MG tablet Take 1 tablet (10 mg) by mouth 3 times daily as needed 30 tablet 0     levothyroxine (SYNTHROID/LEVOTHROID) 112 MCG tablet Take 3 tablets (336 mcg) by mouth once a week 30 tablet      [employer] FIBER GUMMY BEARS CHILDREN CHEW Take 5 g by mouth daily (2 gummy= 5 g =1 serving)       triamcinolone (KENALOG) 0.025 % ointment Apply topically as needed  3     lidocaine (XYLOCAINE) 5 % ointment Apply quarter size amount to chest and back up to 3 times daily as needed for pain. 350 g 1     montelukast (SINGULAIR) 10 MG tablet Take 1 tablet (10 mg) by mouth 2 times daily (Patient taking  differently: Take 20 mg by mouth 2 times daily ) 60 tablet 0     furosemide (LASIX) 20 MG tablet Take 1 tablet (20 mg) by mouth 2 times daily 60 tablet 0     magic mouthwash suspension (diphenhydrAMINE, lidocaine, aluminum-magnesium & simethicone) Swish and spit 10 mLs in mouth every 6 hours as needed for mouth sores 360 mL 1     bisacodyl (DULCOLAX) 5 MG EC tablet Take 3 tablets (15 mg) by mouth daily as needed for constipation 30 tablet 0     polyethylene glycol (MIRALAX) powder Take 17 g (1 capful) by mouth daily 510 g 0     senna-docusate (SENOKOT-S;PERICOLACE) 8.6-50 MG per tablet Take 1-2 tablets by mouth 2 times daily as needed for constipation 60 tablet 0     UNABLE TO FIND Take 2 capsules by mouth 3 times daily Muscle Mag. 2 caps contain B1 20mg, B2 20mg, B6 10mg, magesium 20mg, manganese 2mg.       calcitRIOL (ROCALTROL) 0.25 MCG capsule TAKE 2 CAPSULES BY MOUTH EVERY MORNING AND TAKE 1 CAPSULE BY MOUTH EVERY NIGHT AT BEDTIME (Patient taking differently: TAKE 2 CAPSULES BY MOUTH EVERY MORNING AND TAKE 1 CAPSULE BY MOUTH at noon) 270 capsule 3     celecoxib (CELEBREX) 200 MG capsule TAKE ONE CAPSULE BY MOUTH TWICE DAILY  56 capsule 0     methocarbamol (ROBAXIN) 750 MG tablet Take 1 tablet (750 mg) by mouth 4 times daily as needed . Ok to take a 5th Robaxin on very severe days. (Patient taking differently: Take 750 mg by mouth 4 times daily . Ok to take a 5th Robaxin on very severe days.) 360 tablet 1     levothyroxine (SYNTHROID/LEVOTHROID) 112 MCG tablet Mon-Sat: (2 tabs daily) Sunday: 3 tabs (Patient taking differently: 112 mcg Mon-Sat: (2 tabs daily) Sunday: 3 tabs) 189 tablet 3     hydrochlorothiazide (MICROZIDE) 12.5 MG capsule Take 1 capsule (12.5 mg) by mouth daily 90 capsule 2     cromolyn (OPTICROM) 4 % ophthalmic solution Place 1 drop into both eyes 4 times daily 10 mL 5     Cholecalciferol (VITAMIN D3) 2000 UNITS CAPS Take 5,000 Units by mouth daily Takes 2 tabs daily 60 capsule 4     ranitidine  (ZANTAC) 75 MG tablet Take 1 tablet (75 mg) by mouth 3 times daily 270 tablet 3     aluminum chloride (DRYSOL) 20 % external solution Apply topically At Bedtime 60 mL 3     calcium citrate-vitamin D (CALCIUM CITRATE +D) 315-250 MG-UNIT TABS Take 2 tablets by mouth twice a week  120 tablet      calcium carbonate (TUMS) 500 MG chewable tablet Take 2 tablets (1,000 mg) by mouth nightly as needed for other (cramps) 180 chew tab 3     UNABLE TO FIND 3 tablets 3 times daily MEDICATION NAME: calcium D-Glucarate   3 caps contain 180mg of elemental calcium.       Triamcinolone Acetonide (AZMACORT IN) Inhale 2 puffs into the lungs as needed       UNABLE TO FIND 2 tablets 3 times daily MEDICATION NAME: Digestzymes        UNABLE TO FIND 1 tablet daily MEDICATION NAME: Pure Encapsulations       Probiotic Product (PROBIOTIC DAILY PO) Take 1 capsule by mouth daily Lacto acid bifidobacterium       order for DME Compression stockings, medium grade, please measure and fit. Please dispense a pair. (Patient not taking: Reported on 12/13/2017) 1 Units 0     COMPOUND CONTAINING CONTROLLED SUBSTANCE (CMPD RX) - PHARMACY TO MIX COMPOUNDED MEDICATION Apply small amount to affected area two times daily. (Patient not taking: Reported on 11/27/2017) 120 g 6     EPINEPHrine (EPIPEN 2-CARIDAD) 0.3 MG/0.3ML injection Inject 0.3 mLs (0.3 mg) into the muscle once as needed for anaphylaxis (Patient not taking: Reported on 12/7/2017) 0.6 mL 3     VENTOLIN  (90 BASE) MCG/ACT Inhaler INHALE 2 PUFFS INTO THE LUNGS EVERY 4 HOURS AS NEEDED FOR SHORTNESS OF BREATH OR DIFFICULT BREATHING OR WHEEZING (Patient not taking: Reported on 12/7/2017) 18 g 3     order for DME Equipment being ordered: compression stockings. 20-30 mm or 30 - 40 mm as patient can tolerate. Physical therapy to determine if they should be above or below the knee. (Patient not taking: Reported on 12/13/2017) 2 Units 4     order for DME Equipment being ordered: compression bra (Patient  "not taking: Reported on 12/7/2017) 2 Device 1     Physical Examination:  /69  Pulse 104  Temp 98.1  F (36.7  C)  Resp 18  Ht 1.651 m (5' 5\")  Wt 82.3 kg (181 lb 8 oz)  SpO2 99%  BMI 30.2 kg/m2  Wt Readings from Last 10 Encounters:   01/09/18 82.3 kg (181 lb 8 oz)   01/03/18 85.7 kg (189 lb)   01/01/18 87.5 kg (193 lb)   12/26/17 85.5 kg (188 lb 7.9 oz)   12/19/17 83.8 kg (184 lb 11.2 oz)   12/15/17 84.9 kg (187 lb 3.2 oz)   12/13/17 88.2 kg (194 lb 6.4 oz)   12/11/17 88.4 kg (194 lb 14.4 oz)   12/07/17 86.3 kg (190 lb 4.8 oz)   11/28/17 84.3 kg (185 lb 12.8 oz)   Constitutional: Well-appearing female in no acute distress. Appears tired.  Eyes: EOMI, PERRL. No scleral icterus.  ENT: Oral mucosa is moist without lesions or thrush.   Lymphatic: Neck is supple without cervical or supraclavicular lymphadenopathy.   Cardiovascular: Regular rate and rhythm   Respiratory: Clear to auscultation bilaterally. No wheezes or crackles.  Gastrointestinal: Bowel sounds present. Abdomen soft, nontender.  No masses palpated.   Neurologic: Cranial nerves II through XII are intact.   Skin: Skin on fingertips and the bottoms of her feet is dry and mildly erythematous.  Port site is unremarkable  Extremities: 1+ lower extremity edema bilaterally. Wearing compression stockings     Laboratory Data:   1/9/2018 07:03   Sodium 134   Potassium 3.2 (L)   Chloride 95   Carbon Dioxide 31   Urea Nitrogen 15   Creatinine 0.72   GFR Estimate 83   GFR Estimate If Black >90   Calcium 7.3 (L)   Anion Gap 8   Albumin 2.8 (L)   Protein Total PENDING   Bilirubin Total 0.5   Alkaline Phosphatase 73   ALT 32   AST 14   Lactate Dehydrogenase 165   Uric Acid 4.9   Glucose 133 (H)   WBC 0.8 (LL)   Hemoglobin 8.2 (L)   Hematocrit 24.1 (L)   Platelet Count 54 (L)   RBC Count 2.45 (L)   MCV 98   MCH 33.5 (H)   MCHC 34.0   RDW 17.0 (H)   Diff Method Manual Differential   % Neutrophils 66.7   % Lymphocytes 19.0   % Monocytes 13.1   % Eosinophils 0.0 " "  % Basophils 1.2   Nucleated RBCs 4 (H)   Absolute Neutrophil 0.5 (L)   Absolute Lymphocytes 0.2 (L)   Absolute Monocytes 0.1   Absolute Eosinophils 0.0   Absolute Basophils 0.0   Absolute Nucleated RBC 0.0   Anisocytosis Slight   Poikilocytosis Slight   Polychromasia Slight   Teardrop Cells Slight       Assessment and Plan:    1. DLBCL, \"double-hit\"-like, currently on treatment with DA-R-EPOCH  Now s/p 5 cycles with CR on PET/CT after 4. Most recent cycle was not dose escalated due to toro ANC of 300. She overall tolerated well though has more fatigue and chemo brain and mild GI symptoms controlled with meds. Her counts are nadiring today. Neulasta was given on 1/2. Follow-up with me on 1/18 prior to cycle 6 admission.     2. Edema: Peripheral edema overall improving. Continue to wear compression stockings. Lasix 20 mg daily and KCl 20 mEq daily.     3. Heme: counts downtrending  No transfusion needs today but will move up transfusion given Hgb 8.2 and some symptoms. Continue twice weekly checks and transfuse if Hgb<8 and Plt<10k.     4. ID:  without active infectious concerns   Continue ppx ACV 400mg BID. Levaquin 250mg daily and fluconazole 200mg daily    5. History of stage 1, ER/OR+, HER2- breast cancer s/p bilateral mastectomies and BENJIE/BSO  Follows with Dr. Crawley. Oncotype recurrence score 11%. Was initially on Arimidex ([id number]) but changed to Tamoxifen due to arthralgias. Tamoxifen held since 10/5 and continue to hold with chemotherapy. Last f/u Dr. Crawley was on 11/27/17.    6. History of papillary thyroid cancer, s/p total thyroidectomy  Follows with Dr. Castro. Has post surgical hypothyroidism. Continue levothyroxine and calcitriol as prescribed. Neck u/s on 11/2 shows no evidence of disease.    7. History of Rachael en Y gastric bypass  Continue supplements (calcium citrate-vitamin D, vitamin D3, magnesium citrate). Also has iron deficiency anemia likely from poor absorption and per Dr." Sohail, she should continue 325mg PO ferrous sulfate TID.     8. Chronic chest wall pain s/p mastectomies  Follows with Dr. Benitez. Palliative care to continue to prescribe pain medications. Taking MS Contin 30/30/60 mg tid and oxycodone prn, currently 15 mg qid for breakthrough pain, and celebrex. She did appear more sedated today.     10. Constipation.  Under control with Colace, fiber tabs, and MiraLax when needed, which she will continue.      Rashida Jaffe PA-C    [facility]  [address] [place] [id number]  [phone]

## 2018-01-09 NOTE — NURSING NOTE
Port accessed by RN, pt tolerated well. Labs collected and sent, line flushed with NS and heparin. Vitals taken and pt checked in for next appt.   Marina Rocha

## 2018-01-09 NOTE — PROGRESS NOTES
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 She is currently undergoing treatment with DA-R-EPOCH. Her past medical history is significant for breast cancer in 2011 s/p bilateral mastectomies and BENJIE/BSO up until recently on Tamoxifen, papillary thyroid cancer s/p total thyroidectomy, chronic chest wall pain, and asthma. Briefly, her oncologic history is as follows:    She presented in 8/2017 with dramatically worse chest and back pain. CT 9/1/17 showed lytic lesion right 10th rib extending to right T9-10 neural foramen with vertebral body invasion. There was associated conglomerate of lymphadenopathy around the mid T-spine. She had a CT guided biopsy showing B-cell high grade lymphoma. Pathology showed \"The morphology shows a population of large cells, diffuse lymphoid infiltrate. The cells are atypical with abundant mitotic and apoptotic activity and single cell necrosis. Tumor is CD45 and CD79a positive and focally weakly CD30 positive. The other stains revealed positivity for CD20, BCL6, BCL2 and MUM1, and CD10 and CD21 negative. The CD5 and CD3 highlighted background T-cells.  MYC expression was equivocal with approximately 40% nuclei staining.  Ki-67 proliferative index is greater than 90-95%. The immunohistochemistry is suggestive of non-GCB immunophenotype of diffuse large B-cell lymphoma. The cytogenetics did not show rearrangement of the BCL2 or c-MYC. There is the presence of BCL6 rearrangement in 78% of cells and gains of MYC and BCL2, but no amplifications.\"     Staging LP and BMBx were negative for lymphoma. She started DA-REPOCH 10/6/17. She did well with chemo other than rigors during CIVI. D/c 10/11/17. Given Neulasta 10/12/17. Post cycle 1 complicated by mucositis and worsening of chronic LE peripheral edema. She began cycle 2 on " "10/27/17. Due to a rise in her LD and uric acid levels on that admission day, she underwent a CT scan which showed response to therapy with improvement in her mediastinal lymphadenopathy and right chest wall mass. She had a CR following 4 cycles on PET/CT 12/21.     Interval History:  Elvin is here for follow-up after cycle 5 of chemotherapy. She has felt more tired the past 2 cycles with orthostatic dizziness. Fell while up with IV pole in the hospital. No falls since. This has seemed worse the last few days. She has SOB with stairs otherwise is ambulating okay. No SOB at rest or palpitations. Her chronic post-mastectomy pain seems worse but this is not related to exertion. She denies any mucositis this past cycle. Nausea is well controlled on zofran TID. Bowels are moving well with fiber con. Urination is okay other than feels \"hot\" when it passes. No burning, bleeding, or odor. No fevers/chills, cough, congestion, abdominal pain. Edema is better. She is drinking 8 c of fluids per day and eating fairly well though less. Missed her potassium dose today. Still trying to work as much as she can. ROS otherwise negative.     Current Outpatient Prescriptions   Medication Sig Dispense Refill     potassium chloride SA (KLOR-CON) 20 MEQ CR tablet Take 1 tablet (20 mEq) by mouth daily 90 tablet 3     lidocaine-prilocaine (EMLA) cream Apply topically as needed (port access pain.) 30 g 1     ondansetron (ZOFRAN-ODT) 8 MG ODT tab Take 1 tablet (8 mg) by mouth every 8 hours as needed 30 tablet 1     dexamethasone (DECADRON) 4 MG tablet Take 2 tablets (8 mg) by mouth daily Please take on 1/2/18 and 1/3/18. 4 tablet 0     diazepam (VALIUM) 5 MG tablet Take 1 tablet (5 mg) by mouth 3 times daily as needed For muscle spasms 90 tablet 2     morphine (MS CONTIN) 30 MG 12 hr tablet Take 1 tablet (30 mg) by mouth every 8 hours . Take an addition pill at night such that your evening dose is 60mg 120 tablet 0     oxyCODONE IR " (ROXICODONE) 30 MG tablet Take 1 tablet (30 mg) by mouth every 4 hours as needed for moderate to severe pain 180 tablet 0     prochlorperazine (COMPAZINE) 10 MG tablet Take 10 mg by mouth as needed  3     SUMAtriptan (IMITREX) 50 MG tablet Take 50 mg by mouth as needed  3     cromolyn sodium (NASALCROM) 5.2 MG/ACT AERS Inhaler SPRAY ONE SPRAY( 1 ML) IN NOSTRIL DAILY 1 Bottle 6     acetaminophen (TYLENOL) 325 MG tablet Take 2 tablets (650 mg) by mouth every 4 hours as needed for mild pain or fever 100 tablet      fluconazole (DIFLUCAN) 200 MG tablet Take 1 tablet (200 mg) by mouth daily 30 tablet 0     cetirizine (ZYRTEC ALLERGY) 10 MG tablet Take 1 tablet (10 mg) by mouth 3 times daily On hold for lab test. 30 tablet 0     acyclovir (ZOVIRAX) 400 MG tablet Take 1 tablet (400 mg) by mouth 2 times daily 60 tablet 0     diclofenac (VOLTAREN) 1 % GEL topical gel Apply affected area two times daily PRN using enclosed dosing card. 100 g 0     levofloxacin (LEVAQUIN) 250 MG tablet Take 1 tablet (250 mg) by mouth daily 30 tablet 0     allopurinol (ZYLOPRIM) 300 MG tablet Take 1 tablet (300 mg) by mouth daily 30 tablet 0     ferrous sulfate (IRON) 325 (65 FE) MG tablet Take 1 tablet (325 mg) by mouth 3 times daily (with meals) 90 tablet 0     cyclobenzaprine (FLEXERIL) 10 MG tablet Take 1 tablet (10 mg) by mouth 3 times daily as needed 30 tablet 0     levothyroxine (SYNTHROID/LEVOTHROID) 112 MCG tablet Take 3 tablets (336 mcg) by mouth once a week 30 tablet      [employer] FIBER GUMMY BEARS CHILDREN CHEW Take 5 g by mouth daily (2 gummy= 5 g =1 serving)       triamcinolone (KENALOG) 0.025 % ointment Apply topically as needed  3     lidocaine (XYLOCAINE) 5 % ointment Apply quarter size amount to chest and back up to 3 times daily as needed for pain. 350 g 1     montelukast (SINGULAIR) 10 MG tablet Take 1 tablet (10 mg) by mouth 2 times daily (Patient taking differently: Take 20 mg by mouth 2 times daily ) 60 tablet 0     furosemide  (LASIX) 20 MG tablet Take 1 tablet (20 mg) by mouth 2 times daily 60 tablet 0     magic mouthwash suspension (diphenhydrAMINE, lidocaine, aluminum-magnesium & simethicone) Swish and spit 10 mLs in mouth every 6 hours as needed for mouth sores 360 mL 1     bisacodyl (DULCOLAX) 5 MG EC tablet Take 3 tablets (15 mg) by mouth daily as needed for constipation 30 tablet 0     polyethylene glycol (MIRALAX) powder Take 17 g (1 capful) by mouth daily 510 g 0     senna-docusate (SENOKOT-S;PERICOLACE) 8.6-50 MG per tablet Take 1-2 tablets by mouth 2 times daily as needed for constipation 60 tablet 0     UNABLE TO FIND Take 2 capsules by mouth 3 times daily Muscle Mag. 2 caps contain B1 20mg, B2 20mg, B6 10mg, magesium 20mg, manganese 2mg.       calcitRIOL (ROCALTROL) 0.25 MCG capsule TAKE 2 CAPSULES BY MOUTH EVERY MORNING AND TAKE 1 CAPSULE BY MOUTH EVERY NIGHT AT BEDTIME (Patient taking differently: TAKE 2 CAPSULES BY MOUTH EVERY MORNING AND TAKE 1 CAPSULE BY MOUTH at noon) 270 capsule 3     celecoxib (CELEBREX) 200 MG capsule TAKE ONE CAPSULE BY MOUTH TWICE DAILY  56 capsule 0     methocarbamol (ROBAXIN) 750 MG tablet Take 1 tablet (750 mg) by mouth 4 times daily as needed . Ok to take a 5th Robaxin on very severe days. (Patient taking differently: Take 750 mg by mouth 4 times daily . Ok to take a 5th Robaxin on very severe days.) 360 tablet 1     levothyroxine (SYNTHROID/LEVOTHROID) 112 MCG tablet Mon-Sat: (2 tabs daily) Sunday: 3 tabs (Patient taking differently: 112 mcg Mon-Sat: (2 tabs daily) Sunday: 3 tabs) 189 tablet 3     hydrochlorothiazide (MICROZIDE) 12.5 MG capsule Take 1 capsule (12.5 mg) by mouth daily 90 capsule 2     cromolyn (OPTICROM) 4 % ophthalmic solution Place 1 drop into both eyes 4 times daily 10 mL 5     Cholecalciferol (VITAMIN D3) 2000 UNITS CAPS Take 5,000 Units by mouth daily Takes 2 tabs daily 60 capsule 4     ranitidine (ZANTAC) 75 MG tablet Take 1 tablet (75 mg) by mouth 3 times daily 270  tablet 3     aluminum chloride (DRYSOL) 20 % external solution Apply topically At Bedtime 60 mL 3     calcium citrate-vitamin D (CALCIUM CITRATE +D) 315-250 MG-UNIT TABS Take 2 tablets by mouth twice a week  120 tablet      calcium carbonate (TUMS) 500 MG chewable tablet Take 2 tablets (1,000 mg) by mouth nightly as needed for other (cramps) 180 chew tab 3     UNABLE TO FIND 3 tablets 3 times daily MEDICATION NAME: calcium D-Glucarate   3 caps contain 180mg of elemental calcium.       Triamcinolone Acetonide (AZMACORT IN) Inhale 2 puffs into the lungs as needed       UNABLE TO FIND 2 tablets 3 times daily MEDICATION NAME: Digestzymes        UNABLE TO FIND 1 tablet daily MEDICATION NAME: Pure Encapsulations       Probiotic Product (PROBIOTIC DAILY PO) Take 1 capsule by mouth daily Lacto acid bifidobacterium       order for DME Compression stockings, medium grade, please measure and fit. Please dispense a pair. (Patient not taking: Reported on 12/13/2017) 1 Units 0     COMPOUND CONTAINING CONTROLLED SUBSTANCE (CMPD RX) - PHARMACY TO MIX COMPOUNDED MEDICATION Apply small amount to affected area two times daily. (Patient not taking: Reported on 11/27/2017) 120 g 6     EPINEPHrine (EPIPEN 2-CARIDAD) 0.3 MG/0.3ML injection Inject 0.3 mLs (0.3 mg) into the muscle once as needed for anaphylaxis (Patient not taking: Reported on 12/7/2017) 0.6 mL 3     VENTOLIN  (90 BASE) MCG/ACT Inhaler INHALE 2 PUFFS INTO THE LUNGS EVERY 4 HOURS AS NEEDED FOR SHORTNESS OF BREATH OR DIFFICULT BREATHING OR WHEEZING (Patient not taking: Reported on 12/7/2017) 18 g 3     order for DME Equipment being ordered: compression stockings. 20-30 mm or 30 - 40 mm as patient can tolerate. Physical therapy to determine if they should be above or below the knee. (Patient not taking: Reported on 12/13/2017) 2 Units 4     order for DME Equipment being ordered: compression bra (Patient not taking: Reported on 12/7/2017) 2 Device 1     Physical  "Examination:  /69  Pulse 104  Temp 98.1  F (36.7  C)  Resp 18  Ht 1.651 m (5' 5\")  Wt 82.3 kg (181 lb 8 oz)  SpO2 99%  BMI 30.2 kg/m2  Wt Readings from Last 10 Encounters:   01/09/18 82.3 kg (181 lb 8 oz)   01/03/18 85.7 kg (189 lb)   01/01/18 87.5 kg (193 lb)   12/26/17 85.5 kg (188 lb 7.9 oz)   12/19/17 83.8 kg (184 lb 11.2 oz)   12/15/17 84.9 kg (187 lb 3.2 oz)   12/13/17 88.2 kg (194 lb 6.4 oz)   12/11/17 88.4 kg (194 lb 14.4 oz)   12/07/17 86.3 kg (190 lb 4.8 oz)   11/28/17 84.3 kg (185 lb 12.8 oz)   Constitutional: Well-appearing female in no acute distress. Appears tired.  Eyes: EOMI, PERRL. No scleral icterus.  ENT: Oral mucosa is moist without lesions or thrush.   Lymphatic: Neck is supple without cervical or supraclavicular lymphadenopathy.   Cardiovascular: Regular rate and rhythm   Respiratory: Clear to auscultation bilaterally. No wheezes or crackles.  Gastrointestinal: Bowel sounds present. Abdomen soft, nontender.  No masses palpated.   Neurologic: Cranial nerves II through XII are intact.   Skin: Skin on fingertips and the bottoms of her feet is dry and mildly erythematous.  Port site is unremarkable  Extremities: 1+ lower extremity edema bilaterally. Wearing compression stockings     Laboratory Data:   1/9/2018 07:03   Sodium 134   Potassium 3.2 (L)   Chloride 95   Carbon Dioxide 31   Urea Nitrogen 15   Creatinine 0.72   GFR Estimate 83   GFR Estimate If Black >90   Calcium 7.3 (L)   Anion Gap 8   Albumin 2.8 (L)   Protein Total PENDING   Bilirubin Total 0.5   Alkaline Phosphatase 73   ALT 32   AST 14   Lactate Dehydrogenase 165   Uric Acid 4.9   Glucose 133 (H)   WBC 0.8 (LL)   Hemoglobin 8.2 (L)   Hematocrit 24.1 (L)   Platelet Count 54 (L)   RBC Count 2.45 (L)   MCV 98   MCH 33.5 (H)   MCHC 34.0   RDW 17.0 (H)   Diff Method Manual Differential   % Neutrophils 66.7   % Lymphocytes 19.0   % Monocytes 13.1   % Eosinophils 0.0   % Basophils 1.2   Nucleated RBCs 4 (H)   Absolute " "Neutrophil 0.5 (L)   Absolute Lymphocytes 0.2 (L)   Absolute Monocytes 0.1   Absolute Eosinophils 0.0   Absolute Basophils 0.0   Absolute Nucleated RBC 0.0   Anisocytosis Slight   Poikilocytosis Slight   Polychromasia Slight   Teardrop Cells Slight       Assessment and Plan:    1. DLBCL, \"double-hit\"-like, currently on treatment with DA-R-EPOCH  Now s/p 5 cycles with CR on PET/CT after 4. Most recent cycle was not dose escalated due to toro ANC of 300. She overall tolerated well though has more fatigue and chemo brain and mild GI symptoms controlled with meds. Her counts are nadiring today. Neulasta was given on 1/2. Follow-up with me on 1/18 prior to cycle 6 admission.     2. Edema: Peripheral edema overall improving. Continue to wear compression stockings. Lasix 20 mg daily and KCl 20 mEq daily.     3. Heme: counts downtrending  No transfusion needs today but will move up transfusion given Hgb 8.2 and some symptoms. Continue twice weekly checks and transfuse if Hgb<8 and Plt<10k.     4. ID:  without active infectious concerns   Continue ppx ACV 400mg BID. Levaquin 250mg daily and fluconazole 200mg daily    5. History of stage 1, ER/NV+, HER2- breast cancer s/p bilateral mastectomies and BENJIE/BSO  Follows with Dr. Crawley. Oncotype recurrence score 11%. Was initially on Arimidex (2919-1448) but changed to Tamoxifen due to arthralgias. Tamoxifen held since 10/5 and continue to hold with chemotherapy. Last f/u Dr. Crawley was on 11/27/17.    6. History of papillary thyroid cancer, s/p total thyroidectomy  Follows with Dr. Castro. Has post surgical hypothyroidism. Continue levothyroxine and calcitriol as prescribed. Neck u/s on 11/2 shows no evidence of disease.    7. History of Rachael en Y gastric bypass  Continue supplements (calcium citrate-vitamin D, vitamin D3, magnesium citrate). Also has iron deficiency anemia likely from poor absorption and per Dr. Sauceda, she should continue 325mg PO ferrous sulfate " TID.     8. Chronic chest wall pain s/p mastectomies  Follows with Dr. Benitez. Palliative care to continue to prescribe pain medications. Taking MS Contin 30/30/60 mg tid and oxycodone prn, currently 15 mg qid for breakthrough pain, and celebrex. She did appear more sedated today.     10. Constipation.  Under control with Colace, fiber tabs, and MiraLax when needed, which she will continue.     11. Urinary symptoms  Will have her leave  to further evaluate.      Rashida Jaffe PA-C    [facility]  [address] [place] [address] [id number] [phone]

## 2018-01-09 NOTE — MR AVS SNAPSHOT
After Visit Summary   1/9/2018    Tisha Arias    MRN: [id number]           Patient Information     Date Of Birth          1960        Visit Information        Provider Department      1/9/2018 7:50 AM Rashida Jaffe PA [facility]        Today's Diagnoses     High grade B-cell lymphoma (H)        Hypokalemia           Follow-ups after your visit        Your next 10 appointments already scheduled     Jan 12, 2018  9:00 AM CST   [facility] Lab Draw with [facility] MASONIC LAB DRAW   [facility] ([facility])    [address] [place] [address]  [address]  [facility] [address]   [phone]            Jan 12, 2018 10:30 AM CST   Infusion 360 with UC ONCOLOGY INFUSION, UC 17 ATC   [facility] ([facility])    [address] [place] [address]  [address]  [facility] [address]   [phone]            Noe 15, 2018  8:30 AM CST   Masonic Lab Draw with UC MASONIC LAB DRAW   [facility]    [address] [place] [address]  [address]  [facility] [address]   [phone]            Noe 15, 2018  9:00 AM CST   Infusion 360 with UC ONCOLOGY INFUSION, UC 26 ATC   [facility] ([facility])    [address] [place] [address]  [address]  [facility] [address]   [phone]            Jan 18, 2018  8:00 AM CST   Masonic Lab Draw with UC MASONIC LAB DRAW   [facility] Masonic Lab Draw ([facility])    [address] [place] [address]  [address]  [facility] [address]   [phone]            Jan 18, 2018  8:30 AM CST   (Arrive by 8:15 AM)   Return Visit with COTY Jessica   [facility] ([facility])    [address] [place] [address]  [address]  [facility] [address]   [phone]            Mar 05, 2018  6:45 AM CST   (Arrive by 6:30 AM)   PET ONCOLOGY WHOLE BODY with UUPET1   [facility] ([facility])    [address] [id number] [phone]           Tell your doctor:   If there is any chance you may be pregnant or if you are breastfeeding.   If you have problems lying in small spaces (claustrophobia). If you do, your doctor may give you medicine to help you relax. If you have diabetes:   Have your exam early in the morning. Your blood glucose will go up as the day goes by.   Your glucose level must be 180 or less at the start of the exam. Please take any medicines you need to ensure this blood glucose level. 24 hours before your scan: Don t do any heavy exercise. (No jogging, aerobics or other workouts.) Exercise will make your pictures less accurate. 6 hours before your scan:   Stop all food and liquids (except water).   Do not chew gum or suck on mints.   If you need to take medicine with food, you may take it with a few crackers.  Please call your Imaging Department at your exam site with any questions.            Mar 06, 2018  5:00 PM CST   RETURN ONC with Garima Sauceda MD   [facility] Marrow Transplant ([facility])    [address]  [address]  [facility] [address] [phone]              Future tests that were ordered for you today     Open Future Orders        Priority Expected Expires Ordered    Routine UA with micro reflex to culture Routine 1/9/2018 1/9/2019 1/9/2018            Who to contact     If you have questions or need follow up information about today's clinic visit or your schedule please contact [facility] directly at [phone].  Normal or non-critical lab and imaging results will be communicated to you by MyChart, letter or phone within 4 business days after the clinic has received the results. If you do not hear from us within 7 days, please contact the clinic through MyChart or phone. If you have a critical or  "abnormal lab result, we will notify you by phone as soon as possible.  Submit refill requests through [employer] or call your pharmacy and they will forward the refill request to us. Please allow 3 business days for your refill to be completed.          Additional Information About Your Visit        Secret Spacehart Information     [employer] gives you secure access to your electronic health record. If you see a primary care provider, you can also send messages to your care team and make appointments. If you have questions, please call your primary care clinic.  If you do not have a primary care provider, please call [phone] and they will assist you.        Care EveryWhere ID     This is your Care EveryWhere ID. This could be used by other organizations to access your [place] medical records  [id number]        Your Vitals Were     Pulse Temperature Respirations Height Pulse Oximetry BMI (Body Mass Index)    104 98.1  F (36.7  C) 18 1.651 m (5' 5\") 99% 30.2 kg/m2       Blood Pressure from Last 3 Encounters:   01/09/18 109/69   01/03/18 113/74   01/01/18 120/64    Weight from Last 3 Encounters:   01/09/18 82.3 kg (181 lb 8 oz)   01/03/18 85.7 kg (189 lb)   01/01/18 87.5 kg (193 lb)              We Performed the Following     ABO/Rh type and screen     CBC with platelets differential     Comprehensive metabolic panel     Lactate Dehydrogenase     Uric acid          Today's Medication Changes          These changes are accurate as of: 1/9/18  8:22 AM.  If you have any questions, ask your nurse or doctor.               These medicines have changed or have updated prescriptions.        Dose/Directions    calcitRIOL 0.25 MCG capsule   Commonly known as:  ROCALTROL   This may have changed:  additional instructions   Used for:  Postsurgical hypothyroidism, Papillary carcinoma, follicular variant (H), Metastasis to cervical lymph node (H)        TAKE 2 CAPSULES BY MOUTH EVERY MORNING AND TAKE 1 CAPSULE BY MOUTH EVERY NIGHT AT " BEDTIME   Quantity:  270 capsule   Refills:  3       * levothyroxine 112 MCG tablet   Commonly known as:  SYNTHROID/LEVOTHROID   This may have changed:    - how much to take  - additional instructions   Used for:  Postsurgical hypothyroidism, Papillary carcinoma, follicular variant (H), Postsurgical hypoparathyroidism (H), Thyroid cancer (H)   Changed by:  Avelina Castro MD        Mon-Sat: (2 tabs daily) Sunday: 3 tabs   Quantity:  189 tablet   Refills:  3       * levothyroxine 112 MCG tablet   Commonly known as:  SYNTHROID/LEVOTHROID   This may have changed:  Another medication with the same name was changed. Make sure you understand how and when to take each.   Used for:  High grade B-cell lymphoma (H)        Dose:  336 mcg   Take 3 tablets (336 mcg) by mouth once a week   Quantity:  30 tablet   Refills:  0       methocarbamol 750 MG tablet   Commonly known as:  ROBAXIN   This may have changed:    - when to take this  - additional instructions   Used for:  Myofascial pain        Dose:  750 mg   Take 1 tablet (750 mg) by mouth 4 times daily as needed . Ok to take a 5th Robaxin on very severe days.   Quantity:  360 tablet   Refills:  1       montelukast 10 MG tablet   Commonly known as:  SINGULAIR   This may have changed:  how much to take   Used for:  Idiopathic mast cell activation syndrome (H)        Dose:  10 mg   Take 1 tablet (10 mg) by mouth 2 times daily   Quantity:  60 tablet   Refills:  0       * Notice:  This list has 2 medication(s) that are the same as other medications prescribed for you. Read the directions carefully, and ask your doctor or other care provider to review them with you.         Where to get your medicines      These medications were sent to [id number] - [place], [place] [address] [address]  [address] [address] [place] [address]     Phone:  [phone]     potassium chloride SA 20 MEQ CR tablet                Primary Care Provider Office Phone # Fax #    Shahla Carter  MD [place] [phone]       [address] [place] [address]  [facility] [address]        Equal Access to Services     RODRIGO ZAMORA : Hadii aad ku hadmoraimajennifer Venegas, girish robertooluha, timmy erinmounada daina, ranjan santo sowmyaerrol padron laEmilirajiv martin. So [facility] [phone].    ATENCIÓN: Si habla español, tiene a stiles disposición servicios gratuitos de asistencia lingüística. Llame al [phone].    We comply with applicable federal civil rights laws and [place] laws. We do not discriminate on the basis of race, color, national origin, age, disability, sex, sexual orientation, or gender identity.            Thank you!     Thank you for choosing [facility]  for your care. Our goal is always to provide you with excellent care. Hearing back from our patients is one way we can continue to improve our services. Please take a few minutes to complete the written survey that you may receive in the mail after your visit with us. Thank you!             Your Updated Medication List - Protect others around you: Learn how to safely use, store and throw away your medicines at www.disposemymeds.org.          This list is accurate as of: 1/9/18  8:22 AM.  Always use your most recent med list.                   Brand Name Dispense Instructions for use Diagnosis    acetaminophen 325 MG tablet    TYLENOL    100 tablet    Take 2 tablets (650 mg) by mouth every 4 hours as needed for mild pain or fever    High grade B-cell lymphoma (H)       acyclovir 400 MG tablet    ZOVIRAX    60 tablet    Take 1 tablet (400 mg) by mouth 2 times daily    High grade B-cell lymphoma (H)       allopurinol 300 MG tablet    ZYLOPRIM    30 tablet    Take 1 tablet (300 mg) by mouth daily    High grade B-cell lymphoma (H)       aluminum chloride 20 % external solution    DRYSOL    60 mL    Apply topically At Bedtime    Rash, Intertrigo       AZMACORT IN      Inhale 2 puffs into the lungs as needed        bisacodyl 5 MG EC tablet     30  tablet    Take 3 tablets (15 mg) by mouth daily as needed for constipation    Constipation, unspecified constipation type       calcitRIOL 0.25 MCG capsule    ROCALTROL    270 capsule    TAKE 2 CAPSULES BY MOUTH EVERY MORNING AND TAKE 1 CAPSULE BY MOUTH EVERY NIGHT AT BEDTIME    Postsurgical hypothyroidism, Papillary carcinoma, follicular variant (H), Metastasis to cervical lymph node (H)       CALCIUM CITRATE +D 315-250 MG-UNIT Tabs per tablet   Generic drug:  calcium citrate-vitamin D     120 tablet    Take 2 tablets by mouth twice a week        celecoxib 200 MG capsule    celeBREX    56 capsule    TAKE ONE CAPSULE BY MOUTH TWICE DAILY    Chest wall pain       cetirizine 10 MG tablet    ZYRTEC ALLERGY    30 tablet    Take 1 tablet (10 mg) by mouth 3 times daily On hold for lab test.    High grade B-cell lymphoma (H)       COMPOUND CONTAINING CONTROLLED SUBSTANCE - PHARMACY TO MIX COMPOUNDED MEDICATION    CMPD RX    120 g    Apply small amount to affected area two times daily.    Neoplasm related pain       cromolyn 4 % ophthalmic solution    OPTICROM    10 mL    Place 1 drop into both eyes 4 times daily    Idiopathic mast cell activation syndrome (H)       cromolyn sodium 5.2 MG/ACT Aers Inhaler    NASALCROM    1 Bottle    SPRAY ONE SPRAY( 1 ML) IN NOSTRIL DAILY    Mast cell disease, systemic       [employer] GUMMY BE[employer] CHILDREN Chew      Take 5 g by mouth daily (2 gummy= 5 g =1 serving)    High grade B-cell lymphoma (H)       cyclobenzaprine 10 MG tablet    FLEXERIL    30 tablet    Take 1 tablet (10 mg) by mouth 3 times daily as needed    High grade B-cell lymphoma (H)       dexamethasone 4 MG tablet    DECADRON    4 tablet    Take 2 tablets (8 mg) by mouth daily Please take on 1/2/18 and 1/3/18.    High grade B-cell lymphoma (H)       diazepam 5 MG tablet    VALIUM    90 tablet    Take 1 tablet (5 mg) by mouth 3 times daily as needed For muscle spasms    Chest wall pain       diclofenac 1 % Gel topical gel     VOLTAREN    100 g    Apply affected area two times daily PRN using enclosed dosing card.    Myofascial pain       EPINEPHrine 0.3 MG/0.3ML injection 2-pack    EPIPEN 2-CARIDAD    0.6 mL    Inject 0.3 mLs (0.3 mg) into the muscle once as needed for anaphylaxis        ferrous sulfate 325 (65 FE) MG tablet    IRON    90 tablet    Take 1 tablet (325 mg) by mouth 3 times daily (with meals)    Status post bariatric surgery       fluconazole 200 MG tablet    DIFLUCAN    30 tablet    Take 1 tablet (200 mg) by mouth daily    High grade B-cell lymphoma (H)       furosemide 20 MG tablet    LASIX    60 tablet    Take 1 tablet (20 mg) by mouth 2 times daily    Localized edema       hydrochlorothiazide 12.5 MG capsule    MICROZIDE    90 capsule    Take 1 capsule (12.5 mg) by mouth daily    Hypocalcemia       levofloxacin 250 MG tablet    LEVAQUIN    30 tablet    Take 1 tablet (250 mg) by mouth daily    High grade B-cell lymphoma (H)       * levothyroxine 112 MCG tablet    SYNTHROID/LEVOTHROID    189 tablet    Mon-Sat: (2 tabs daily) Sunday: 3 tabs    Postsurgical hypothyroidism, Papillary carcinoma, follicular variant (H), Postsurgical hypoparathyroidism (H), Thyroid cancer (H)       * levothyroxine 112 MCG tablet    SYNTHROID/LEVOTHROID    30 tablet    Take 3 tablets (336 mcg) by mouth once a week    High grade B-cell lymphoma (H)       lidocaine 5 % ointment    XYLOCAINE    350 g    Apply quarter size amount to chest and back up to 3 times daily as needed for pain.    Chest wall pain       lidocaine visc 2% 2.5mL/5mL & maalox/mylanta w/ simeth 2.5mL/5mL & diphenhydrAMINE 5mg/5mL Susp suspension    [facility]    360 mL    Swish and spit 10 mLs in mouth every 6 hours as needed for mouth sores    Stomatitis and mucositis, High grade B-cell lymphoma (H)       lidocaine-prilocaine cream    EMLA    30 g    Apply topically as needed (port access pain.)    Neoplasm related pain, Chest wall pain       methocarbamol 750 MG  tablet    ROBAXIN    360 tablet    Take 1 tablet (750 mg) by mouth 4 times daily as needed . Ok to take a 5th Robaxin on very severe days.    Myofascial pain       montelukast 10 MG tablet    SINGULAIR    60 tablet    Take 1 tablet (10 mg) by mouth 2 times daily    Idiopathic mast cell activation syndrome (H)       morphine 30 MG 12 hr tablet    MS CONTIN    120 tablet    Take 1 tablet (30 mg) by mouth every 8 hours . Take an addition pill at night such that your evening dose is 60mg    Neoplasm related pain       ondansetron 8 MG ODT tab    ZOFRAN-ODT    30 tablet    Take 1 tablet (8 mg) by mouth every 8 hours as needed    High grade B-cell lymphoma (H), Nausea and vomiting, intractability of vomiting not specified, unspecified vomiting type       * order for DME     2 Units    Equipment being ordered: compression stockings. 20-30 mm or 30 - 40 mm as patient can tolerate. Physical therapy to determine if they should be above or below the knee.    Venous stasis       * order for DME     2 Device    Equipment being ordered: compression bra    Malignant neoplasm of right female breast, unspecified site of breast       * order for DME     1 Units    Compression stockings, medium grade, please measure and fit. Please dispense a pair.    Peripheral edema       oxyCODONE IR 30 MG tablet    ROXICODONE    180 tablet    Take 1 tablet (30 mg) by mouth every 4 hours as needed for moderate to severe pain    High grade B-cell lymphoma (H)       polyethylene glycol powder    MIRALAX    510 g    Take 17 g (1 capful) by mouth daily    Acute constipation       potassium chloride SA 20 MEQ CR tablet    KLOR-CON    90 tablet    Take 1 tablet (20 mEq) by mouth daily    Hypokalemia       PROBIOTIC DAILY PO      Take 1 capsule by mouth daily Lacto acid bifidobacterium    Breast cancer, unspecified laterality, Thyroid cancer (H), Chronic arthralgias of knees and hips, unspecified laterality       prochlorperazine 10 MG tablet    COMPAZINE      Take 10 mg by mouth as needed        ranitidine 75 MG tablet    ZANTAC    270 tablet    Take 1 tablet (75 mg) by mouth 3 times daily    Mast cell disease, systemic       senna-docusate 8.6-50 MG per tablet    SENOKOT-S;PERICOLACE    60 tablet    Take 1-2 tablets by mouth 2 times daily as needed for constipation    Acute constipation       SUMAtriptan 50 MG tablet    IMITREX     Take 50 mg by mouth as needed        triamcinolone 0.025 % ointment    KENALOG     Apply topically as needed        TUMS 500 MG chewable tablet   Generic drug:  calcium carbonate     180 chew tab    Take 2 tablets (1,000 mg) by mouth nightly as needed for other (cramps)        * UNABLE TO FIND      3 tablets 3 times daily MEDICATION NAME: calcium D-Glucarate  3 caps contain 180mg of elemental calcium.        * UNABLE TO FIND      Take 2 capsules by mouth 3 times daily Muscle Mag. 2 caps contain B1 20mg, B2 20mg, B6 10mg, magesium 20mg, manganese 2mg.        * UNABLE TO FIND      2 tablets 3 times daily MEDICATION NAME: Digestzymes    Thyroid cancer (H), Postsurgical hypothyroidism, Postsurgical hypoparathyroidism (H)       * UNABLE TO FIND      1 tablet daily MEDICATION NAME: Pure Encapsulations    Thyroid cancer (H), Postsurgical hypothyroidism, Postsurgical hypoparathyroidism (H)       VENTOLIN  (90 BASE) MCG/ACT Inhaler   Generic drug:  albuterol     18 g    INHALE 2 PUFFS INTO THE LUNGS EVERY 4 HOURS AS NEEDED FOR SHORTNESS OF BREATH OR DIFFICULT BREATHING OR WHEEZING    Mild intermittent asthma without complication       vitamin D3 2000 UNITS Caps     60 capsule    Take 5,000 Units by mouth daily Takes 2 tabs daily    Thyroid cancer (H), Postsurgical hypothyroidism, Papillary carcinoma, follicular variant (H), Postsurgical hypoparathyroidism (H)       * Notice:  This list has 9 medication(s) that are the same as other medications prescribed for you. Read the directions carefully, and ask your doctor or other care provider to  review them with you.

## 2018-01-10 ENCOUNTER — APPOINTMENT (OUTPATIENT)
Dept: LAB | Facility: CLINIC | Age: 58
End: 2018-01-10
Attending: INTERNAL MEDICINE
Payer: COMMERCIAL

## 2018-01-10 ENCOUNTER — INFUSION THERAPY VISIT (OUTPATIENT)
Dept: ONCOLOGY | Facility: CLINIC | Age: 58
End: 2018-01-10
Attending: INTERNAL MEDICINE
Payer: COMMERCIAL

## 2018-01-10 ENCOUNTER — CARE COORDINATION (OUTPATIENT)
Dept: ONCOLOGY | Facility: CLINIC | Age: 58
End: 2018-01-10

## 2018-01-10 VITALS
OXYGEN SATURATION: 97 % | DIASTOLIC BLOOD PRESSURE: 59 MMHG | RESPIRATION RATE: 16 BRPM | TEMPERATURE: 97.9 F | HEART RATE: 85 BPM | SYSTOLIC BLOOD PRESSURE: 105 MMHG

## 2018-01-10 DIAGNOSIS — K12.1 STOMATITIS AND MUCOSITIS: ICD-10-CM

## 2018-01-10 DIAGNOSIS — K12.30 STOMATITIS AND MUCOSITIS: ICD-10-CM

## 2018-01-10 DIAGNOSIS — C83.33 DIFFUSE LARGE B-CELL LYMPHOMA OF INTRA-ABDOMINAL LYMPH NODES (H): ICD-10-CM

## 2018-01-10 DIAGNOSIS — C85.10 HIGH GRADE B-CELL LYMPHOMA (H): Primary | ICD-10-CM

## 2018-01-10 LAB
ANISOCYTOSIS BLD QL SMEAR: SLIGHT
BASOPHILS # BLD AUTO: 0 10E9/L (ref 0–0.2)
BASOPHILS NFR BLD AUTO: 0 %
BLD PROD TYP BPU: NORMAL
BLD UNIT ID BPU: 0
BLOOD PRODUCT CODE: NORMAL
BPU ID: NORMAL
DACRYOCYTES BLD QL SMEAR: SLIGHT
DIFFERENTIAL METHOD BLD: ABNORMAL
EOSINOPHIL # BLD AUTO: 0 10E9/L (ref 0–0.7)
EOSINOPHIL NFR BLD AUTO: 0 %
ERYTHROCYTE [DISTWIDTH] IN BLOOD BY AUTOMATED COUNT: 17.3 % (ref 10–15)
HCT VFR BLD AUTO: 23.8 % (ref 35–47)
HGB BLD-MCNC: 7.9 G/DL (ref 11.7–15.7)
LYMPHOCYTES # BLD AUTO: 0.4 10E9/L (ref 0.8–5.3)
LYMPHOCYTES NFR BLD AUTO: 10.5 %
MCH RBC QN AUTO: 33.5 PG (ref 26.5–33)
MCHC RBC AUTO-ENTMCNC: 33.2 G/DL (ref 31.5–36.5)
MCV RBC AUTO: 101 FL (ref 78–100)
METAMYELOCYTES # BLD: 0.1 10E9/L
METAMYELOCYTES NFR BLD MANUAL: 3.5 %
MONOCYTES # BLD AUTO: 0.3 10E9/L (ref 0–1.3)
MONOCYTES NFR BLD AUTO: 8.8 %
MYELOCYTES # BLD: 0.1 10E9/L
MYELOCYTES NFR BLD MANUAL: 1.7 %
NEUTROPHILS # BLD AUTO: 2.8 10E9/L (ref 1.6–8.3)
NEUTROPHILS NFR BLD AUTO: 74.6 %
PLATELET # BLD AUTO: 80 10E9/L (ref 150–450)
PLATELET # BLD EST: ABNORMAL 10*3/UL
POIKILOCYTOSIS BLD QL SMEAR: SLIGHT
POLYCHROMASIA BLD QL SMEAR: SLIGHT
PROMYELOCYTES # BLD MANUAL: 0 10E9/L
PROMYELOCYTES NFR BLD MANUAL: 0.9 %
RBC # BLD AUTO: 2.36 10E12/L (ref 3.8–5.2)
TRANSFUSION STATUS PATIENT QL: NORMAL
WBC # BLD AUTO: 3.7 10E9/L (ref 4–11)

## 2018-01-10 PROCEDURE — 36430 TRANSFUSION BLD/BLD COMPNT: CPT

## 2018-01-10 PROCEDURE — P9016 RBC LEUKOCYTES REDUCED: HCPCS | Performed by: PHYSICIAN ASSISTANT

## 2018-01-10 PROCEDURE — 87040 BLOOD CULTURE FOR BACTERIA: CPT | Performed by: PHYSICIAN ASSISTANT

## 2018-01-10 PROCEDURE — 25000128 H RX IP 250 OP 636: Mod: ZF | Performed by: INTERNAL MEDICINE

## 2018-01-10 PROCEDURE — G0463 HOSPITAL OUTPT CLINIC VISIT: HCPCS | Mod: 25

## 2018-01-10 PROCEDURE — 85025 COMPLETE CBC W/AUTO DIFF WBC: CPT | Performed by: PHYSICIAN ASSISTANT

## 2018-01-10 RX ORDER — HEPARIN SODIUM,PORCINE 10 UNIT/ML
5 VIAL (ML) INTRAVENOUS ONCE
Status: DISCONTINUED | OUTPATIENT
Start: 2018-01-10 | End: 2018-01-10 | Stop reason: HOSPADM

## 2018-01-10 RX ORDER — HEPARIN SODIUM (PORCINE) LOCK FLUSH IV SOLN 100 UNIT/ML 100 UNIT/ML
5 SOLUTION INTRAVENOUS ONCE
Status: COMPLETED | OUTPATIENT
Start: 2018-01-10 | End: 2018-01-10

## 2018-01-10 RX ADMIN — SODIUM CHLORIDE, PRESERVATIVE FREE 5 ML: 5 INJECTION INTRAVENOUS at 10:37

## 2018-01-10 ASSESSMENT — PAIN SCALES - GENERAL: PAINLEVEL: EXTREME PAIN (8)

## 2018-01-10 NOTE — PROGRESS NOTES
"Infusion Nursing Note:  Tisha Arias presents today for 2 units PRBC's.    Patient seen by provider today: No Patient saw COTY Kuhn yesterday and denies any changes overnight   present during visit today: Not Applicable.    Note: Patient states that she has been feeling dizzy with position changes for a long time, denies any dizziness at rest.  She is feeling tired today. Patient states that she usually gets mouth sores during this week and requested a refill on magic mouthwash.  A UA was sent yesterday due to patient's complaints of \"hot urine\" and difficulty starting her stream.  COTY Kuhn reviewed UA results this am with no new orders.      1/10/2018 0745 COTY Melendez/Tiny Dowd RN  -ok to refill magic mouthwash today     Intravenous Access:  Implanted Port.    Treatment Conditions:  Lab Results   Component Value Date    HGB 7.9 01/10/2018     Lab Results   Component Value Date    WBC 3.7 01/10/2018      Lab Results   Component Value Date    ANEU 2.8 01/10/2018     Lab Results   Component Value Date    PLT 80 01/10/2018      Results reviewed, labs MET treatment parameters, ok to proceed with treatment.  Blood transfusion consent signed 10/16/17.      Patient was hypotensive prior to starting her transfusion today 90s/50s.  After starting patient's second unit of blood her BP dropped to 80s/40s. Patient reported feeling very tired but denied other symptoms.  She states she has not taken her diuretics yet this am.  COTY Kuhn updated:    1/10/2018 0935 COTY Melendez/Tiny Dowd RN  -draw blood cultures x 2 today  -ok to continue with PRBC transfusion, continue to monitor vital signs     At the end of transfusion BP 92/53.  Patient denied feeling dizzy     1/10/2018 10:28 AM COTY Melendez/Tiny Dowd RN  -ok to discharge patient to home  -instruct patient to hold her lasix and hydrochlorothiazide today and encourage her to push " oral fluids.  Remind her to call with any fevers, shaking chills, or other symptoms of infection.      Blood cultures drawn from port and peripherally prior to discharge.    Patient verbalized understanding of Rashida's instructions.      Post Infusion Assessment:  Patient tolerated infusion without incident.  Blood return noted pre and post infusion.  Site patent and intact, free from redness, edema or discomfort.  No evidence of extravasations.  Access discontinued per protocol.    Discharge Plan:   Prescription refill for magic mouthwash sent to the clinic pharmacy.  They will mail medication to the patient tomorrow  Discharge instructions reviewed with: Patient.  Patient and/or family verbalized understanding of discharge instructions and all questions answered.  Copy of AVS reviewed with patient and/or family.  Patient will return 1/12 for next appointment for labs only, 1/15 for next infusion appointment  Patient discharged in stable condition accompanied by: self.  Departure Mode: Ambulatory.  Face to Face time: 5 minutes.    Tiny Dowd RN

## 2018-01-10 NOTE — NURSING NOTE
Chief Complaint   Patient presents with     Port Draw     Labs via port draw, line flushed and hep locked, VS taken     Mustapha Strange RN

## 2018-01-10 NOTE — PATIENT INSTRUCTIONS
Contact Numbers    [facility] [place], [place] Main Line: [phone]  [facility] [place], [place] Triage:  [phone]    Call triage with chills and/or temperature greater than or equal to 100.5, uncontrolled nausea/vomiting, diarrhea, constipation, dizziness, shortness of breath, chest pain, bleeding, unexplained bruising, or any new/concerning symptoms, questions/concerns.     If you are having any concerning symptoms or wish to speak to a provider before your next infusion visit, please call your care coordinator or triage to notify them so we can adequately serve you.       After Hours: [phone]    If after hours, weekends, or holidays, call main hospital  and ask for Oncology doctor on call.           January 2018 Sunday Monday Tuesday Wednesday Thursday Friday Saturday        1     2     [facility]    9:15 AM   (15 min.)   [facility]   [facility]     [facility] ONC INFUSION 360    9:30 AM   (360 min.)    ONCOLOGY INFUSION   [facility] 3     [facility]    8:00 AM   (15 min.)    [facility]   [facility]     [facility] INJECTION    8:45 AM   (30 min.)   Nurse,  Oncology Injection   [facility] 4     5     [facility]    9:30 AM   (15 min.)   [facility]   [facility] 6       7     8     9     [facility]    7:15 AM   (15 min.)   [facility]   [facility]     [facility] RETURN    7:35 AM   (50 min.)   Rashida Jaffe PA   [facility] 10     [facility]    6:30 AM   (15 min.)   [facility]   [facility]     [facility] ONC INFUSION 360    7:00 AM   (360 min.)   [facility] ONCOLOGY INFUSION   [facility] 11     12     [facility]    8:30 AM   (15 min.)   [facility]   [facility] 13       14     15     [facility]    8:30 AM   (15 min.)   [facility]   [facility]     [facility] ONC  INFUSION 360    9:00 AM   (360 min.)   [facility] ONCOLOGY INFUSION   [facility] 16     17     18     [facility]    8:00 AM   (15 min.)    [facility]   [facility] Lab Draw     [facility] RETURN    8:15 AM   (50 min.)   Rashida Jaffe PA   [facility] 19     20       21     22     23     24     25     26     27       28     29     30     31 February 2018 Sunday Monday Tuesday Wednesday Thursday Friday Saturday                       1     2     3       4     5     6     7     8     9     10       11     12     13     14     15     16     17       18     19     20     21     22     23     24       25     26     27     PET ONCOLOGY WHOLE BODY    6:30 AM   (45 min.)   UUPET1   [facility] [place] PET CT 28     [facility] ONC RETURN   10:15 AM   (30 min.)   Garima Sauceda MD   [facility] Transplant                            Recent Results (from the past 24 hour(s))   Blood component    Collection Time: 01/09/18 10:33 AM   Result Value Ref Range    Unit Number [id number]     Blood Component Type PlateletPheresis,LeukoRed Irrad (Part 2)     Division Number 00     Status of Unit Ready for patient 01/10/2018 0716     Blood Product Code [id number]     Unit Status KATHLEEN    Routine UA with micro reflex to culture    Collection Time: 01/09/18 11:12 AM   Result Value Ref Range    Color Urine Sadia     Appearance Urine Slightly Cloudy     Glucose Urine Negative NEG^Negative mg/dL    Bilirubin Urine Small (A) NEG^Negative    Ketones Urine 5 (A) NEG^Negative mg/dL    Specific Gravity Urine 1.028 1.003 - 1.035    Blood Urine Negative NEG^Negative    pH Urine 5.0 5.0 - 7.0 pH    Protein Albumin Urine 30 (A) NEG^Negative mg/dL    Urobilinogen mg/dL 4.0 (H) 0.0 - 2.0 mg/dL    Nitrite Urine Negative NEG^Negative    Leukocyte Esterase Urine Negative NEG^Negative    Source Unspecified Urine     WBC Urine 3 (H) 0 - 2 /HPF    RBC Urine 2 0 - 2  /HPF    Bacteria Urine Few (A) NEG^Negative /HPF    Squamous Epithelial /HPF Urine <1 0 - 1 /HPF    Mucous Urine Present (A) NEG^Negative /LPF   CBC with platelets differential    Collection Time: 01/10/18  7:20 AM   Result Value Ref Range    WBC 3.7 (L) 4.0 - 11.0 10e9/L    RBC Count 2.36 (L) 3.8 - 5.2 10e12/L    Hemoglobin 7.9 (L) 11.7 - 15.7 g/dL    Hematocrit 23.8 (L) 35.0 - 47.0 %     (H) 78 - 100 fl    MCH 33.5 (H) 26.5 - 33.0 pg    MCHC 33.2 31.5 - 36.5 g/dL    RDW 17.3 (H) 10.0 - 15.0 %    Platelet Count 80 (L) 150 - 450 10e9/L    Diff Method Manual Differential     % Neutrophils 74.6 %    % Lymphocytes 10.5 %    % Monocytes 8.8 %    % Eosinophils 0.0 %    % Basophils 0.0 %    % Metamyelocytes 3.5 %    % Myelocytes 1.7 %    % Promyelocytes 0.9 %    Absolute Neutrophil 2.8 1.6 - 8.3 10e9/L    Absolute Lymphocytes 0.4 (L) 0.8 - 5.3 10e9/L    Absolute Monocytes 0.3 0.0 - 1.3 10e9/L    Absolute Eosinophils 0.0 0.0 - 0.7 10e9/L    Absolute Basophils 0.0 0.0 - 0.2 10e9/L    Absolute Metamyelocytes 0.1 (H) 0 10e9/L    Absolute Myelocytes 0.1 (H) 0 10e9/L    Absolute Promyeloctyes 0.0 0 10e9/L    Anisocytosis Slight     Poikilocytosis Slight     Polychromasia Slight     Teardrop Cells Slight     Platelet Estimate Confirming automated cell count

## 2018-01-10 NOTE — MR AVS SNAPSHOT
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     If you are having any concerning symptoms or wish to speak to a provider before your next infusion visit, please call your care coordinator or triage to notify them so we can adequately serve you.       After Hours: [phone]    If after hours, weekends, or holidays, call main hospital  and ask for Oncology doctor on call.           January 2018 Sunday Monday Tuesday Wednesday Thursday Friday Saturday        1     2     [facility] DRAW    9:15 AM   (15 min.)    MASONIC LAB DRAW   [facility] Masonic Lab Draw     [facility] ONC INFUSION 360    9:30 AM   (360 min.)    ONCOLOGY INFUSION   [facility] 3     [facility] DRAW    8:00 AM   (15 min.)    MASONIC LAB DRAW   [facility] Masonic Lab Draw     [facility] INJECTION    8:45 AM   (30 min.)   Nurse,  Oncology Injection   [facility] 4     5     [facility] DRAW    9:30 AM   (15 min.)    MASONIC LAB DRAW   [facility] Draw 6       7     8     9     [facility] DRAW    7:15 AM   (15 min.)    MASONIC LAB DRAW   [facility] Masonic Lab Draw     [facility] RETURN    7:35 AM   (50 min.)   Alannah  COTY Viramontes   [facility] 10     P MASONIC LAB DRAW    6:30 AM   (15 min.)    MASONIC LAB DRAW   [facility] Masonic Lab Draw     [facility] ONC INFUSION 360    7:00 AM   (360 min.)    ONCOLOGY INFUSION   [facility] 11     12     [facility] LAB DRAW    8:30 AM   (15 min.)    MASONIC LAB DRAW   [facility] Masonic Lab Draw 13       14     15     [facility] DRAW    8:30 AM   (15 min.)    MASONIC LAB DRAW   [facility] Masonic Lab Draw     [facility] ONC INFUSION 360    9:00 AM   (360 min.)    ONCOLOGY INFUSION   [facility] 16     17     18     [facility] DRAW    8:00 AM   (15 min.)    MASONIC LAB DRAW   [facility] Lab Draw     [facility] RETURN    8:15 AM   (50 min.)   Rashida Jaffe PA   [facility] 19     20       21     22     23     24     25     26     27       28     29     30     31 February 2018 Sunday Monday Tuesday Wednesday Thursday Friday Saturday                       1     2     3       4     5     6     7     8     9     10       11     12     13     14     15     16     17       18     19     20     21     22     23     24       25     26     27     PET ONCOLOGY WHOLE BODY    6:30 AM   (45 min.)   UUPET1   [facility] [place] PET CT 28     P ONC RETURN   10:15 AM   (30 min.)   Garima Sauceda MD   [facility] Blood and Marrow Transplant                            Recent Results (from the past 24 hour(s))   Blood component    Collection Time: 01/09/18 10:33 AM   Result Value Ref Range    Unit Number [id number]     Blood Component Type PlateletPheresis,LeukoRed Irrad (Part 2)     Division Number 00     Status of Unit Ready for patient 01/10/2018 0716     Blood Product Code [id number]     Unit Status KATHLEEN    Routine UA with micro reflex to culture    Collection Time: 01/09/18 11:12 AM   Result Value Ref Range    Color Urine Sadia     Appearance Urine Slightly Cloudy     Glucose Urine  Negative NEG^Negative mg/dL    Bilirubin Urine Small (A) NEG^Negative    Ketones Urine 5 (A) NEG^Negative mg/dL    Specific Gravity Urine 1.028 1.003 - 1.035    Blood Urine Negative NEG^Negative    pH Urine 5.0 5.0 - 7.0 pH    Protein Albumin Urine 30 (A) NEG^Negative mg/dL    Urobilinogen mg/dL 4.0 (H) 0.0 - 2.0 mg/dL    Nitrite Urine Negative NEG^Negative    Leukocyte Esterase Urine Negative NEG^Negative    Source Unspecified Urine     WBC Urine 3 (H) 0 - 2 /HPF    RBC Urine 2 0 - 2 /HPF    Bacteria Urine Few (A) NEG^Negative /HPF    Squamous Epithelial /HPF Urine <1 0 - 1 /HPF    Mucous Urine Present (A) NEG^Negative /LPF   CBC with platelets differential    Collection Time: 01/10/18  7:20 AM   Result Value Ref Range    WBC 3.7 (L) 4.0 - 11.0 10e9/L    RBC Count 2.36 (L) 3.8 - 5.2 10e12/L    Hemoglobin 7.9 (L) 11.7 - 15.7 g/dL    Hematocrit 23.8 (L) 35.0 - 47.0 %     (H) 78 - 100 fl    MCH 33.5 (H) 26.5 - 33.0 pg    MCHC 33.2 31.5 - 36.5 g/dL    RDW 17.3 (H) 10.0 - 15.0 %    Platelet Count 80 (L) 150 - 450 10e9/L    Diff Method Manual Differential     % Neutrophils 74.6 %    % Lymphocytes 10.5 %    % Monocytes 8.8 %    % Eosinophils 0.0 %    % Basophils 0.0 %    % Metamyelocytes 3.5 %    % Myelocytes 1.7 %    % Promyelocytes 0.9 %    Absolute Neutrophil 2.8 1.6 - 8.3 10e9/L    Absolute Lymphocytes 0.4 (L) 0.8 - 5.3 10e9/L    Absolute Monocytes 0.3 0.0 - 1.3 10e9/L    Absolute Eosinophils 0.0 0.0 - 0.7 10e9/L    Absolute Basophils 0.0 0.0 - 0.2 10e9/L    Absolute Metamyelocytes 0.1 (H) 0 10e9/L    Absolute Myelocytes 0.1 (H) 0 10e9/L    Absolute Promyeloctyes 0.0 0 10e9/L    Anisocytosis Slight     Poikilocytosis Slight     Polychromasia Slight     Teardrop Cells Slight     Platelet Estimate Confirming automated cell count                  Follow-ups after your visit        Your next 10 appointments already scheduled     Jan 12, 2018  8:30 AM [facility]   [facility] Lab Draw with  MASONIC LAB DRAW   [facility] Lab Draw ([facility])    [address]  [address]  [facility] [address]   [phone]            Noe 15, 2018  8:30 AM CST   Masonic Lab Draw with  MASONIC LAB DRAW   [facility]    [address]  [address]  [facility] [address]   [phone]            Noe 15, 2018  9:00 AM CST   Infusion 360 with UC ONCOLOGY INFUSION, UC 26 ATC   [facility] ([facility])    [address] [place] [address]  [address]  [facility] [address]   [phone]            Jan 18, 2018  8:00 AM CST   Masonic Lab Draw with  MASONIC LAB DRAW   [facility]    [address] [place] [address]  [address]  [facility] [address]   [phone]            Jan 18, 2018  8:30 AM CST   (Arrive by 8:15 AM)   Return Visit with COTY Jessica   [facility] ([facility])    [address] [place] [address]  [address]  [facility] [address]   [phone]            Feb 27, 2018  6:45 AM CST   (Arrive by 6:30 AM)   PET ONCOLOGY WHOLE BODY with UUPET1   [facility] [place] PET CT ([facility])    [address] [address]   [phone]           Tell your doctor:   If there is any chance you may be pregnant or if you are breastfeeding.   If you have problems lying in small spaces (claustrophobia). If you do, your doctor may give you medicine to help you relax. If you have diabetes:   Have your exam early in the morning. Your blood glucose will go up as the day goes by.   Your glucose level must be 180 or less at the start of the exam. Please take any medicines you need to ensure this blood glucose level. 24 hours before your scan: Don t do any heavy exercise. (No jogging, aerobics or other workouts.) Exercise will make your pictures less accurate. 6 hours  before your scan:   Stop all food and liquids (except water).   Do not chew gum or suck on mints.   If you need to take medicine with food, you may take it with a few crackers.  Please call your Imaging Department at your exam site with any questions.            Feb 28, 2018 10:15 AM CST   RETURN ONC with Garima Sauceda MD   [facility] Blood and Marrow Transplant ([facility])    [address] [place] [address]  [address]  [facility] [id number] [phone]            May 21, 2018  4:20 PM CDT   (Arrive by 4:05 PM)   RETURN ENDOCRINE with Avelina Castro MD   [facility] Endocrinology ([facility])    [address] [place] [address]  3rd Floor  [facility] [id number] [phone]            Jun 11, 2018  4:00 PM CDT   (Arrive by 3:45 PM)   Return Visit with Shahla Crawley MD   [facility] ([facility])    [address] [place] [address]  [address]  [facility] [id number] [phone]              Future tests that were ordered for you today     Open Standing Orders        Priority Remaining Interval Expires Ordered    Transfuse red blood cell unit Routine 1/2 TRANSFUSE 2 UNITS  1/10/2018    Platelets prepare order unit Routine 99/100 CONDITIONAL (SPECIFY) BLOOD  1/9/2018    Red blood cell prepare order unit Routine 99/100 CONDITIONAL (SPECIFY) BLOOD  1/9/2018            Who to contact     If you have questions or need follow up information about today's clinic visit or your schedule please contact [facility] directly at [phone].  Normal or non-critical lab and imaging results will be communicated to you by MyChart, letter or phone within 4 business days after the clinic has received the results. If you do not hear from us within 7 days, please contact the clinic through MyChart or phone. If you have a critical or abnormal lab result, we will notify you by phone as soon as possible.  Submit refill requests  through [employer] or call your pharmacy and they will forward the refill request to us. Please allow 3 business days for your refill to be completed.          Additional Information About Your Visit        [employer] Information     [employer] gives you secure access to your electronic health record. If you see a primary care provider, you can also send messages to your care team and make appointments. If you have questions, please call your primary care clinic.  If you do not have a primary care provider, please call [phone] and they will assist you.        Care EveryWhere ID     This is your Care EveryWhere ID. This could be used by other organizations to access your [place] medical records  [id number]        Your Vitals Were     Pulse Temperature Respirations Pulse Oximetry          90 98.2  F (36.8  C) (Oral) 16 96%         Blood Pressure from Last 3 Encounters:   01/10/18 95/51   01/09/18 109/69   01/03/18 113/74    Weight from Last 3 Encounters:   01/09/18 82.3 kg (181 lb 8 oz)   01/03/18 85.7 kg (189 lb)   01/01/18 87.5 kg (193 lb)              We Performed the Following     Blood component     CBC with platelets differential     Platelets prepare order unit     Transfuse red blood cell unit          Today's Medication Changes          These changes are accurate as of: 1/10/18  8:22 AM.  If you have any questions, ask your nurse or doctor.               These medicines have changed or have updated prescriptions.        Dose/Directions    calcitRIOL 0.25 MCG capsule   Commonly known as:  ROCALTROL   This may have changed:  additional instructions   Used for:  Postsurgical hypothyroidism, Papillary carcinoma, follicular variant (H), Metastasis to cervical lymph node (H)        TAKE 2 CAPSULES BY MOUTH EVERY MORNING AND TAKE 1 CAPSULE BY MOUTH EVERY NIGHT AT BEDTIME   Quantity:  270 capsule   Refills:  3       * levothyroxine 112 MCG tablet   Commonly known as:  SYNTHROID/LEVOTHROID   This may have changed:    -  how much to take  - additional instructions   Used for:  Postsurgical hypothyroidism, Papillary carcinoma, follicular variant (H), Postsurgical hypoparathyroidism (H), Thyroid cancer (H)   Changed by:  Avelina Castro MD        Mon-Sat: (2 tabs daily) Sunday: 3 tabs   Quantity:  189 tablet   Refills:  3       * levothyroxine 112 MCG tablet   Commonly known as:  SYNTHROID/LEVOTHROID   This may have changed:  Another medication with the same name was changed. Make sure you understand how and when to take each.   Used for:  High grade B-cell lymphoma (H)        Dose:  336 mcg   Take 3 tablets (336 mcg) by mouth once a week   Quantity:  30 tablet   Refills:  0       methocarbamol 750 MG tablet   Commonly known as:  ROBAXIN   This may have changed:    - when to take this  - additional instructions   Used for:  Myofascial pain        Dose:  750 mg   Take 1 tablet (750 mg) by mouth 4 times daily as needed . Ok to take a 5th Robaxin on very severe days.   Quantity:  360 tablet   Refills:  1       montelukast 10 MG tablet   Commonly known as:  SINGULAIR   This may have changed:  how much to take   Used for:  Idiopathic mast cell activation syndrome (H)        Dose:  10 mg   Take 1 tablet (10 mg) by mouth 2 times daily   Quantity:  60 tablet   Refills:  0       * Notice:  This list has 2 medication(s) that are the same as other medications prescribed for you. Read the directions carefully, and ask your doctor or other care provider to review them with you.         Where to get your medicines      Some of these will need a paper prescription and others can be bought over the counter.  Ask your nurse if you have questions.     Bring a paper prescription for each of these medications     lidocaine visc 2% 2.5mL/5mL & maalox/mylanta w/ simeth 2.5mL/5mL & diphenhydrAMINE 5mg/5mL Susp suspension                Primary Care Provider Office Phone # Fax #    Shahla Crawley [phone]       [address] [place] [address]  [address]  [facility] [address]        Equal Access to Services     RODRIGO ZAMORA : Hadii sacha marley alexjennifer Theo, wacastilloda luqadaha, qaybta kaalmada daina, ranjan avelarjaronchance martin. So [facility] [phone].    ATENCIÓN: Si habla español, tiene a stiles disposición servicios gratuitos de asistencia lingüística. Mataame al [phone].    We comply with applicable federal civil rights laws and [place] laws. We do not discriminate on the basis of race, color, national origin, age, disability, sex, sexual orientation, or gender identity.            Thank you!     Thank you for choosing [facility]  for your care. Our goal is always to provide you with excellent care. Hearing back from our patients is one way we can continue to improve our services. Please take a few minutes to complete the written survey that you may receive in the mail after your visit with us. Thank you!             Your Updated Medication List - Protect others around you: Learn how to safely use, store and throw away your medicines at www.disposemymeds.org.          This list is accurate as of: 1/10/18  8:22 AM.  Always use your most recent med list.                   Brand Name Dispense Instructions for use Diagnosis    acetaminophen 325 MG tablet    TYLENOL    100 tablet    Take 2 tablets (650 mg) by mouth every 4 hours as needed for mild pain or fever    High grade B-cell lymphoma (H)       acyclovir 400 MG tablet    ZOVIRAX    60 tablet    Take 1 tablet (400 mg) by mouth 2 times daily    High grade B-cell lymphoma (H)       allopurinol 300 MG tablet    ZYLOPRIM    30 tablet    Take 1 tablet (300 mg) by mouth daily    High grade B-cell lymphoma (H)       aluminum chloride 20 % external solution    DRYSOL    60 mL    Apply topically At Bedtime    Rash, Intertrigo       AZMACORT IN      Inhale 2 puffs into the lungs as needed        bisacodyl 5 MG EC tablet     30 tablet    Take 3 tablets (15 mg) by mouth daily as needed  for constipation    Constipation, unspecified constipation type       calcitRIOL 0.25 MCG capsule    ROCALTROL    270 capsule    TAKE 2 CAPSULES BY MOUTH EVERY MORNING AND TAKE 1 CAPSULE BY MOUTH EVERY NIGHT AT BEDTIME    Postsurgical hypothyroidism, Papillary carcinoma, follicular variant (H), Metastasis to cervical lymph node (H)       CALCIUM CITRATE +D 315-250 MG-UNIT Tabs per tablet   Generic drug:  calcium citrate-vitamin D     120 tablet    Take 2 tablets by mouth twice a week        celecoxib 200 MG capsule    celeBREX    56 capsule    TAKE ONE CAPSULE BY MOUTH TWICE DAILY    Chest wall pain       cetirizine 10 MG tablet    ZYRTEC ALLERGY    30 tablet    Take 1 tablet (10 mg) by mouth 3 times daily On hold for lab test.    High grade B-cell lymphoma (H)       COMPOUND CONTAINING CONTROLLED SUBSTANCE - PHARMACY TO MIX COMPOUNDED MEDICATION    CMPD RX    120 g    Apply small amount to affected area two times daily.    Neoplasm related pain       cromolyn 4 % ophthalmic solution    OPTICROM    10 mL    Place 1 drop into both eyes 4 times daily    Idiopathic mast cell activation syndrome (H)       cromolyn sodium 5.2 MG/ACT Aers Inhaler    NASALCROM    1 Bottle    SPRAY ONE SPRAY( 1 ML) IN NOSTRIL DAILY    Mast cell disease, systemic       [employer] GUMMY BE[employer] CHILDREN Chew      Take 5 g by mouth daily (2 gummy= 5 g =1 serving)    High grade B-cell lymphoma (H)       cyclobenzaprine 10 MG tablet    FLEXERIL    30 tablet    Take 1 tablet (10 mg) by mouth 3 times daily as needed    High grade B-cell lymphoma (H)       dexamethasone 4 MG tablet    DECADRON    4 tablet    Take 2 tablets (8 mg) by mouth daily Please take on 1/2/18 and 1/3/18.    High grade B-cell lymphoma (H)       diazepam 5 MG tablet    VALIUM    90 tablet    Take 1 tablet (5 mg) by mouth 3 times daily as needed For muscle spasms    Chest wall pain       diclofenac 1 % Gel topical gel    VOLTAREN    100 g    Apply affected area two times daily PRN  using enclosed dosing card.    Myofascial pain       EPINEPHrine 0.3 MG/0.3ML injection 2-pack    EPIPEN 2-CARIDAD    0.6 mL    Inject 0.3 mLs (0.3 mg) into the muscle once as needed for anaphylaxis        ferrous sulfate 325 (65 FE) MG tablet    IRON    90 tablet    Take 1 tablet (325 mg) by mouth 3 times daily (with meals)    Status post bariatric surgery       fluconazole 200 MG tablet    DIFLUCAN    30 tablet    Take 1 tablet (200 mg) by mouth daily    High grade B-cell lymphoma (H)       furosemide 20 MG tablet    LASIX    60 tablet    Take 1 tablet (20 mg) by mouth 2 times daily    Localized edema       hydrochlorothiazide 12.5 MG capsule    MICROZIDE    90 capsule    Take 1 capsule (12.5 mg) by mouth daily    Hypocalcemia       levofloxacin 250 MG tablet    LEVAQUIN    30 tablet    Take 1 tablet (250 mg) by mouth daily    High grade B-cell lymphoma (H)       * levothyroxine 112 MCG tablet    SYNTHROID/LEVOTHROID    189 tablet    Mon-Sat: (2 tabs daily) Sunday: 3 tabs    Postsurgical hypothyroidism, Papillary carcinoma, follicular variant (H), Postsurgical hypoparathyroidism (H), Thyroid cancer (H)       * levothyroxine 112 MCG tablet    SYNTHROID/LEVOTHROID    30 tablet    Take 3 tablets (336 mcg) by mouth once a week    High grade B-cell lymphoma (H)       lidocaine 5 % ointment    XYLOCAINE    350 g    Apply quarter size amount to chest and back up to 3 times daily as needed for pain.    Chest wall pain       lidocaine visc 2% 2.5mL/5mL & maalox/mylanta w/ simeth 2.5mL/5mL & diphenhydrAMINE 5mg/5mL Susp suspension    [facility]    360 mL    Swish and spit 10 mLs in mouth every 6 hours as needed for mouth sores    Stomatitis and mucositis, High grade B-cell lymphoma (H)       lidocaine-prilocaine cream    EMLA    30 g    Apply topically as needed (port access pain.)    Neoplasm related pain, Chest wall pain       methocarbamol 750 MG tablet    ROBAXIN    360 tablet    Take 1 tablet (750 mg) by  mouth 4 times daily as needed . Ok to take a 5th Robaxin on very severe days.    Myofascial pain       montelukast 10 MG tablet    SINGULAIR    60 tablet    Take 1 tablet (10 mg) by mouth 2 times daily    Idiopathic mast cell activation syndrome (H)       morphine 30 MG 12 hr tablet    MS CONTIN    120 tablet    Take 1 tablet (30 mg) by mouth every 8 hours . Take an addition pill at night such that your evening dose is 60mg    Neoplasm related pain       ondansetron 8 MG ODT tab    ZOFRAN-ODT    30 tablet    Take 1 tablet (8 mg) by mouth every 8 hours as needed    High grade B-cell lymphoma (H), Nausea and vomiting, intractability of vomiting not specified, unspecified vomiting type       * order for DME     2 Units    Equipment being ordered: compression stockings. 20-30 mm or 30 - 40 mm as patient can tolerate. Physical therapy to determine if they should be above or below the knee.    Venous stasis       * order for DME     2 Device    Equipment being ordered: compression bra    Malignant neoplasm of right female breast, unspecified site of breast       * order for DME     1 Units    Compression stockings, medium grade, please measure and fit. Please dispense a pair.    Peripheral edema       oxyCODONE IR 30 MG tablet    ROXICODONE    180 tablet    Take 1 tablet (30 mg) by mouth every 4 hours as needed for moderate to severe pain    High grade B-cell lymphoma (H)       polyethylene glycol powder    MIRALAX    510 g    Take 17 g (1 capful) by mouth daily    Acute constipation       potassium chloride SA 20 MEQ CR tablet    KLOR-CON    90 tablet    Take 1 tablet (20 mEq) by mouth daily    Hypokalemia       PROBIOTIC DAILY PO      Take 1 capsule by mouth daily Lacto acid bifidobacterium    Breast cancer, unspecified laterality, Thyroid cancer (H), Chronic arthralgias of knees and hips, unspecified laterality       prochlorperazine 10 MG tablet    COMPAZINE     Take 10 mg by mouth as needed        ranitidine 75 MG  tablet    ZANTAC    270 tablet    Take 1 tablet (75 mg) by mouth 3 times daily    Mast cell disease, systemic       senna-docusate 8.6-50 MG per tablet    SENOKOT-S;PERICOLACE    60 tablet    Take 1-2 tablets by mouth 2 times daily as needed for constipation    Acute constipation       SUMAtriptan 50 MG tablet    IMITREX     Take 50 mg by mouth as needed        triamcinolone 0.025 % ointment    KENALOG     Apply topically as needed        TUMS 500 MG chewable tablet   Generic drug:  calcium carbonate     180 chew tab    Take 2 tablets (1,000 mg) by mouth nightly as needed for other (cramps)        * UNABLE TO FIND      3 tablets 3 times daily MEDICATION NAME: calcium D-Glucarate  3 caps contain 180mg of elemental calcium.        * UNABLE TO FIND      Take 2 capsules by mouth 3 times daily Muscle Mag. 2 caps contain B1 20mg, B2 20mg, B6 10mg, magesium 20mg, manganese 2mg.        * UNABLE TO FIND      2 tablets 3 times daily MEDICATION NAME: Digestzymes    Thyroid cancer (H), Postsurgical hypothyroidism, Postsurgical hypoparathyroidism (H)       * UNABLE TO FIND      1 tablet daily MEDICATION NAME: Pure Encapsulations    Thyroid cancer (H), Postsurgical hypothyroidism, Postsurgical hypoparathyroidism (H)       VENTOLIN  (90 BASE) MCG/ACT Inhaler   Generic drug:  albuterol     18 g    INHALE 2 PUFFS INTO THE LUNGS EVERY 4 HOURS AS NEEDED FOR SHORTNESS OF BREATH OR DIFFICULT BREATHING OR WHEEZING    Mild intermittent asthma without complication       vitamin D3 2000 UNITS Caps     60 capsule    Take 5,000 Units by mouth daily Takes 2 tabs daily    Thyroid cancer (H), Postsurgical hypothyroidism, Papillary carcinoma, follicular variant (H), Postsurgical hypoparathyroidism (H)       * Notice:  This list has 9 medication(s) that are the same as other medications prescribed for you. Read the directions carefully, and ask your doctor or other care provider to review them with you.

## 2018-01-10 NOTE — PROGRESS NOTES
Elvin called to ask that we move the direct admit on 1/18 to 2:30 pm. She still wants to have labs and SANJANA visit at 0830 that day, then go to an important meeting at work and then present to [facility] afterwards for the direct admit at 2:30 pm. Admission timing adjusted accordingly and SANJANA notified.

## 2018-01-12 DIAGNOSIS — C85.10 HIGH GRADE B-CELL LYMPHOMA (H): ICD-10-CM

## 2018-01-12 LAB
ANISOCYTOSIS BLD QL SMEAR: SLIGHT
BASOPHILS # BLD AUTO: 0 10E9/L (ref 0–0.2)
BASOPHILS NFR BLD AUTO: 0 %
DACRYOCYTES BLD QL SMEAR: SLIGHT
DIFFERENTIAL METHOD BLD: ABNORMAL
EOSINOPHIL # BLD AUTO: 0 10E9/L (ref 0–0.7)
EOSINOPHIL NFR BLD AUTO: 0 %
ERYTHROCYTE [DISTWIDTH] IN BLOOD BY AUTOMATED COUNT: 20 % (ref 10–15)
HCT VFR BLD AUTO: 30.9 % (ref 35–47)
HGB BLD-MCNC: 9.9 G/DL (ref 11.7–15.7)
LYMPHOCYTES # BLD AUTO: 0.1 10E9/L (ref 0.8–5.3)
LYMPHOCYTES NFR BLD AUTO: 1.9 %
MCH RBC QN AUTO: 32.1 PG (ref 26.5–33)
MCHC RBC AUTO-ENTMCNC: 32 G/DL (ref 31.5–36.5)
MCV RBC AUTO: 100 FL (ref 78–100)
METAMYELOCYTES # BLD: 0.2 10E9/L
METAMYELOCYTES NFR BLD MANUAL: 2.8 %
MONOCYTES # BLD AUTO: 0.2 10E9/L (ref 0–1.3)
MONOCYTES NFR BLD AUTO: 4.6 %
NEUTROPHILS # BLD AUTO: 4.8 10E9/L (ref 1.6–8.3)
NEUTROPHILS NFR BLD AUTO: 88.8 %
NRBC # BLD AUTO: 0.1 10*3/UL
NRBC BLD AUTO-RTO: 2 /100
OVALOCYTES BLD QL SMEAR: SLIGHT
PLATELET # BLD AUTO: 141 10E9/L (ref 150–450)
PLATELET # BLD EST: ABNORMAL 10*3/UL
POIKILOCYTOSIS BLD QL SMEAR: SLIGHT
POLYCHROMASIA BLD QL SMEAR: SLIGHT
PROMYELOCYTES # BLD MANUAL: 0.1 10E9/L
PROMYELOCYTES NFR BLD MANUAL: 1.9 %
RBC # BLD AUTO: 3.08 10E12/L (ref 3.8–5.2)
WBC # BLD AUTO: 5.4 10E9/L (ref 4–11)

## 2018-01-12 PROCEDURE — 85025 COMPLETE CBC W/AUTO DIFF WBC: CPT | Performed by: PHYSICIAN ASSISTANT

## 2018-01-12 PROCEDURE — 25000128 H RX IP 250 OP 636

## 2018-01-12 RX ORDER — HEPARIN SODIUM (PORCINE) LOCK FLUSH IV SOLN 100 UNIT/ML 100 UNIT/ML
5 SOLUTION INTRAVENOUS
Status: COMPLETED | OUTPATIENT
Start: 2018-01-12 | End: 2018-01-12

## 2018-01-12 RX ADMIN — SODIUM CHLORIDE, PRESERVATIVE FREE 5 ML: 5 INJECTION INTRAVENOUS at 08:52

## 2018-01-12 NOTE — NURSING NOTE
"Chief Complaint   Patient presents with     Lab Only     labs drawn from port by rn.     Port accessed with 20 gauge 3/4\" gripper needle and labs drawn by rn.  Port flushed with NS and heparin.  Pt tolerated well.    Basilia Schulz RN    "

## 2018-01-15 ENCOUNTER — APPOINTMENT (OUTPATIENT)
Dept: LAB | Facility: CLINIC | Age: 58
End: 2018-01-15
Payer: COMMERCIAL

## 2018-01-15 ENCOUNTER — INFUSION THERAPY VISIT (OUTPATIENT)
Dept: ONCOLOGY | Facility: CLINIC | Age: 58
End: 2018-01-15
Payer: COMMERCIAL

## 2018-01-15 VITALS
BODY MASS INDEX: 31.6 KG/M2 | HEART RATE: 92 BPM | DIASTOLIC BLOOD PRESSURE: 65 MMHG | WEIGHT: 189.9 LBS | RESPIRATION RATE: 16 BRPM | OXYGEN SATURATION: 97 % | TEMPERATURE: 98.6 F | SYSTOLIC BLOOD PRESSURE: 101 MMHG

## 2018-01-15 DIAGNOSIS — C85.10 HIGH GRADE B-CELL LYMPHOMA (H): Primary | ICD-10-CM

## 2018-01-15 DIAGNOSIS — C83.33 DIFFUSE LARGE B-CELL LYMPHOMA OF INTRA-ABDOMINAL LYMPH NODES (H): ICD-10-CM

## 2018-01-15 LAB
ABO + RH BLD: NORMAL
ABO + RH BLD: NORMAL
BASOPHILS # BLD AUTO: 0 10E9/L (ref 0–0.2)
BASOPHILS NFR BLD AUTO: 0.3 %
BLD GP AB SCN SERPL QL: NORMAL
BLOOD BANK CMNT PATIENT-IMP: NORMAL
DIFFERENTIAL METHOD BLD: ABNORMAL
EOSINOPHIL # BLD AUTO: 0 10E9/L (ref 0–0.7)
EOSINOPHIL NFR BLD AUTO: 0.5 %
ERYTHROCYTE [DISTWIDTH] IN BLOOD BY AUTOMATED COUNT: 18.9 % (ref 10–15)
HCT VFR BLD AUTO: 30.3 % (ref 35–47)
HGB BLD-MCNC: 9.8 G/DL (ref 11.7–15.7)
IMM GRANULOCYTES # BLD: 0.3 10E9/L (ref 0–0.4)
IMM GRANULOCYTES NFR BLD: 4.6 %
LYMPHOCYTES # BLD AUTO: 0.4 10E9/L (ref 0.8–5.3)
LYMPHOCYTES NFR BLD AUTO: 6.3 %
MCH RBC QN AUTO: 33.6 PG (ref 26.5–33)
MCHC RBC AUTO-ENTMCNC: 32.3 G/DL (ref 31.5–36.5)
MCV RBC AUTO: 104 FL (ref 78–100)
MONOCYTES # BLD AUTO: 0.7 10E9/L (ref 0–1.3)
MONOCYTES NFR BLD AUTO: 10.7 %
NEUTROPHILS # BLD AUTO: 5 10E9/L (ref 1.6–8.3)
NEUTROPHILS NFR BLD AUTO: 77.6 %
NRBC # BLD AUTO: 0 10*3/UL
NRBC BLD AUTO-RTO: 0 /100
PLATELET # BLD AUTO: 213 10E9/L (ref 150–450)
RBC # BLD AUTO: 2.92 10E12/L (ref 3.8–5.2)
SPECIMEN EXP DATE BLD: NORMAL
WBC # BLD AUTO: 6.5 10E9/L (ref 4–11)

## 2018-01-15 PROCEDURE — 86900 BLOOD TYPING SEROLOGIC ABO: CPT

## 2018-01-15 PROCEDURE — 25000128 H RX IP 250 OP 636: Mod: ZF

## 2018-01-15 PROCEDURE — 85025 COMPLETE CBC W/AUTO DIFF WBC: CPT | Performed by: PHYSICIAN ASSISTANT

## 2018-01-15 PROCEDURE — 36591 DRAW BLOOD OFF VENOUS DEVICE: CPT

## 2018-01-15 PROCEDURE — 86901 BLOOD TYPING SEROLOGIC RH(D): CPT

## 2018-01-15 PROCEDURE — 86850 RBC ANTIBODY SCREEN: CPT

## 2018-01-15 RX ORDER — HEPARIN SODIUM (PORCINE) LOCK FLUSH IV SOLN 100 UNIT/ML 100 UNIT/ML
5 SOLUTION INTRAVENOUS ONCE
Status: COMPLETED | OUTPATIENT
Start: 2018-01-15 | End: 2018-01-15

## 2018-01-15 RX ADMIN — SODIUM CHLORIDE, PRESERVATIVE FREE 5 ML: 5 INJECTION INTRAVENOUS at 09:11

## 2018-01-15 ASSESSMENT — PAIN SCALES - GENERAL: PAINLEVEL: EXTREME PAIN (8)

## 2018-01-15 NOTE — PATIENT INSTRUCTIONS
Contact Numbers  [facility]: [phone]    After Hours:  [phone]  Triage: [phone]    Please call the [facility] Triage line if you experience a temperature greater than or equal to 100.5, shaking chills, have uncontrolled nausea, vomiting and/or diarrhea, dizziness, shortness of breath, chest pain, bleeding, unexplained bruising, or if you have any other new/concerning symptoms, questions or concerns.     If it is after hours, weekends, or holidays, please call the main hospital  at  [phone] and ask to speak to the Oncology doctor on call.     If you are having any concerning symptoms or wish to speak to a provider before your next infusion visit, please call your care coordinator or triage to notify them so we can adequately serve you.     If you need a refill on a narcotic prescription or other medication, please call triage before your infusion appointment.           January 2018 Sunday Monday Tuesday Wednesday Thursday Friday Saturday        1     2     [facility]    9:15 AM   (15 min.)    [facility]     [facility] INFUSION 360    9:30 AM   (360 min.)    ONCOLOGY INFUSION   [facility] 3     [facility] DRAW    8:00 AM   (15 min.)    [facility]     [facility] INJECTION    8:45 AM   (30 min.)   Nurse,  Oncology Injection   [facility] 4     5     [facility] DRAW    9:30 AM   (15 min.)    [facility] DRAW   [facility] Draw 6       7     8     9     [facility] D[facility]    7:15 AM   (15 min.)    [facility]     [facility] RETURN    7:35 AM   (50 min.)   Rashida Jaffe PA-C   [facility] 10     [facility] DRAW    6:30 AM   (15 min.)    [facility] DRAW   [facility] Draw     [facility] ONC INFUSION 360    7:00 AM   (360 min.)    ONCOLOGY INFUSION   [facility]  11     12     [facility] DRAW    8:30 AM   (15 min.)    [facility] DRAW   [facility] Draw 13       14     15     [facility] DRAW    8:30 AM   (15 min.)    [facility] DRAW   [facility] Lab Draw     [facility] ONC INFUSION 360    9:00 AM   (360 min.)   [facility] INFUSION   [facility] 16     17     18     [facility] DRAW    8:00 AM   (15 min.)    [facility] DRAW   [facility] Lab Draw     [facility] RETURN    8:15 AM   (50 min.)   Rashida Jaffe PA-C   [facility] 19     20       21     22     23     24     25     26     27       28     29     30     31 February 2018 Sunday Monday Tuesday Wednesday Thursday Friday Saturday                       1     2     3       4     5     6     7     8     9     10       11     12     13     14     15     16     17       18     19     20     21     22     23     24       25     26     27     PET ONCOLOGY WHOLE BODY    6:30 AM   (45 min.)   [id number]   [facility] [place] PET CT 28     [facility] ONC RETURN   10:15 AM   (30 min.)   Garima Sauceda MD   [facility] Blood and Marrow Transplant                             Lab Results:  Recent Results (from the past 12 hour(s))   CBC with platelets differential    Collection Time: 01/15/18  9:16 AM   Result Value Ref Range    WBC 6.5 4.0 - 11.0 10e9/L    RBC Count 2.92 (L) 3.8 - 5.2 10e12/L    Hemoglobin 9.8 (L) 11.7 - 15.7 g/dL    Hematocrit 30.3 (L) 35.0 - 47.0 %     (H) 78 - 100 fl    MCH 33.6 (H) 26.5 - 33.0 pg    MCHC 32.3 31.5 - 36.5 g/dL    RDW 18.9 (H) 10.0 - 15.0 %    Platelet Count 213 150 - 450 10e9/L    Diff Method Automated Method     % Neutrophils 77.6 %    % Lymphocytes 6.3 %    % Monocytes 10.7 %    % Eosinophils 0.5 %    % Basophils 0.3 %    % Immature Granulocytes 4.6 %    Nucleated RBCs 0 0 /100    Absolute Neutrophil 5.0 1.6 - 8.3 10e9/L    Absolute Lymphocytes 0.4 (L) 0.8 - 5.3 10e9/L    Absolute Monocytes 0.7  0.0 - 1.3 10e9/L    Absolute Eosinophils 0.0 0.0 - 0.7 10e9/L    Absolute Basophils 0.0 0.0 - 0.2 10e9/L    Abs Immature Granulocytes 0.3 0 - 0.4 10e9/L    Absolute Nucleated RBC 0.0

## 2018-01-15 NOTE — PROGRESS NOTES
Infusion Nursing Note:  Tisha Arias presents today for possible blood/platelet transfusion .    Patient seen by provider today: No   present during visit today: Not Applicable.    Note: Patient feeling well. Denies dizziness, double vision, lightheadedness and hx of vertigo. BP sitting 98/63 standing 101/65. States that she is having dizziness when changing position for a short period and goes away.  Otherwise well.     Intravenous Access:  Implanted Port.    Treatment Conditions:  Lab Results   Component Value Date    HGB 9.8 01/15/2018     Lab Results   Component Value Date    WBC 6.5 01/15/2018      Lab Results   Component Value Date    ANEU 5.0 01/15/2018     Lab Results   Component Value Date     01/15/2018      Results reviewed, labs did NOT meet treatment parameters: HB >8     Platelet > 10.         Post Infusion Assessment:  Site patent and intact, free from redness, edema or discomfort.  Access discontinued per protocol.    Discharge Plan:   Patient declined prescription refills.  Discharge instructions reviewed with: Patient.  Patient and/or family verbalized understanding of discharge instructions and all questions answered.  Copy of AVS reviewed with patient and/or family.  Patient will return 1/18/18 for next appointment.    JULIÁN BURROUGHS RN

## 2018-01-15 NOTE — NURSING NOTE
"Chief Complaint   Patient presents with     Port Draw     Labs drawn from port by RN. Line flushed with saline and heparin. Vs taken - notified infusion of pt's BP - pt checked in for appt.      Port accessed with 20g 3/4\" gripper needle by RN, labs collected, line flushed with saline and heparin.  Vitals taken - notified infusion of pt's BP. Pt checked in for appointment(s).    Daly Hooper RN  "

## 2018-01-15 NOTE — MR AVS SNAPSHOT
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: [phone]    After Hours:  [phone]  Triage: [phone]    Please call the [facility] Triage line if you experience a temperature greater than or equal to 100.5, shaking chills, have uncontrolled nausea, vomiting and/or diarrhea, dizziness, shortness of breath, chest pain, bleeding, unexplained bruising, or if you have any other new/concerning symptoms, questions or concerns.     If it is after hours, weekends, or holidays, please call the main hospital  at  [phone] and ask to speak to the Oncology doctor on call.     If you are having any concerning symptoms or wish to speak to a provider before your next infusion visit, please call your care coordinator or triage to notify them so we can adequately serve you.     If you need a refill on a narcotic prescription or other medication, please call triage before your infusion appointment.           January 2018 Sunday Monday Tuesday Wednesday Thursday Friday Saturday        1     2     [facility] LAB DRAW    9:15 AM   (15 min.)    [facility]     P ONC INFUSION 360    9:30 AM   (360 min.)    ONCOLOGY INFUSION   [facility] 3     [facility]    8:00 AM   (15 min.)    [facility]     [facility] INJECTION    8:45 AM   (30 min.)   Nurse,  Oncology Injection   [facility] 4     5     [facility]    9:30 AM   (15 min.)   [facility]   [facility] Draw 6       7     8     9     [facility] DRAW    7:15 AM   (15 min.)    [facility] LAB DRAW   [facility] Lab Draw     [facility] RETURN    7:35 AM   (50 min.)   Rashida Jaffe PA-C   [facility] 10     [facility] DRAW    6:30 AM   (15 min.)    [facility] LAB DRAW   [facility] Draw     [facility] ONC INFUSION 360    7:00 AM   (360 min.)   [facility] ONCOLOGY INFUSION   [facility] 11     12     [facility] DRAW    8:30 AM   (15 min.)    [facility] LAB DRAW   [facility] Draw 13       14     15     [facility] DRAW    8:30 AM   (15 min.)    [facility] LAB DRAW   [facility] Lab Draw     [facility] ONC INFUSION 360    9:00 AM   (360 min.)    ONCOLOGY INFUSION   [facility] 16     17     18     [facility] DRAW    8:00 AM   (15 min.)    [facility] LAB DRAW   [facility] Lab Draw     [facility] RETURN    8:15 AM   (50 min.)   Rashida Jaffe PA-C   [facility] 19     20       21     22     23     24     25     26     27       28     29     30     31 February 2018 Sunday Monday Tuesday Wednesday Thursday Friday Saturday                       1     2     3       4     5     6     7     8     9     10       11     12     13     14     15     16     17       18     19     20     21     22     23     24       25     26     27     PET ONCOLOGY WHOLE BODY    6:30 AM   (45 min.)   UUPET1   [facility] [place] PET CT 28     [facility] ONC RETURN   10:15 AM   (30 min.)   Garima Sauceda MD   [facility] Marrow Transplant                             Lab Results:  Recent Results (from the past 12 hour(s))   CBC with platelets differential    Collection Time: 01/15/18  9:16 AM   Result Value Ref Range    WBC 6.5 4.0 - 11.0 10e9/L    RBC Count 2.92 (L) 3.8 - 5.2 10e12/L    Hemoglobin 9.8 (L) 11.7 - 15.7 g/dL    Hematocrit 30.3 (L) 35.0 - 47.0 %     (H) 78 - 100  fl    MCH 33.6 (H) 26.5 - 33.0 pg    MCHC 32.3 31.5 - 36.5 g/dL    RDW 18.9 (H) 10.0 - 15.0 %    Platelet Count 213 150 - 450 10e9/L    Diff Method Automated Method     % Neutrophils 77.6 %    % Lymphocytes 6.3 %    % Monocytes 10.7 %    % Eosinophils 0.5 %    % Basophils 0.3 %    % Immature Granulocytes 4.6 %    Nucleated RBCs 0 0 /100    Absolute Neutrophil 5.0 1.6 - 8.3 10e9/L    Absolute Lymphocytes 0.4 (L) 0.8 - 5.3 10e9/L    Absolute Monocytes 0.7 0.0 - 1.3 10e9/L    Absolute Eosinophils 0.0 0.0 - 0.7 10e9/L    Absolute Basophils 0.0 0.0 - 0.2 10e9/L    Abs Immature Granulocytes 0.3 0 - 0.4 10e9/L    Absolute Nucleated RBC 0.0                Follow-ups after your visit        Your next 10 appointments already scheduled     Jan 18, 2018  8:00 AM CST   [facility] Lab Draw with  [employer] LAB DRAW   [facility] Lab Draw ([facility])    [address]  [address]  [facility] [address] [phone]            Jan 18, 2018  8:30 AM CST   (Arrive by 8:15 AM)   Return Visit with Rashida Jaffe PA-C   [facility] ([facility])    [address]  [address]  [facility] [id number] [phone]            Feb 27, 2018  6:45 AM CST   (Arrive by 6:30 AM)   PET ONCOLOGY WHOLE BODY with UUPET1   [facility] [place] PET CT ([facility])    [address] [address] [phone]           Tell your doctor:   If there is any chance you may be pregnant or if you are breastfeeding.   If you have problems lying in small spaces (claustrophobia). If you do, your doctor may give you medicine to help you relax. If you have diabetes:   Have your exam early in the morning. Your blood glucose will go up as the day goes by.   Your glucose level must be 180 or less at the start of the exam. Please take any medicines you need to ensure this blood glucose level. 24 hours before your  scan: Don t do any heavy exercise. (No jogging, aerobics or other workouts.) Exercise will make your pictures less accurate. 6 hours before your scan:   Stop all food and liquids (except water).   Do not chew gum or suck on mints.   If you need to take medicine with food, you may take it with a few crackers.  Please call your Imaging Department at your exam site with any questions.            Feb 28, 2018 10:15 AM CST   RETURN ONC with Garima Sauceda MD   [facility] Blood and Marrow Transplant ([facility])    [address] [place] [address]  [address]  [facility] [id number] [phone]            May 21, 2018  4:20 PM CDT   (Arrive by 4:05 PM)   RETURN ENDOCRINE with Avelina Castro MD   [facility] Endocrinology ([facility])    [address] [place] [address]  3rd Floor  [facility] [id number] [phone]            Jun 11, 2018  4:00 PM CDT   (Arrive by 3:45 PM)   Return Visit with Shahla Crawley MD   [facility] ([facility])    [address] [place] [address]  [address]  [facility] [id number] [phone]              Future tests that were ordered for you today     Open Standing Orders        Priority Remaining Interval Expires Ordered    Red blood cell prepare order unit Routine 99/100 CONDITIONAL (SPECIFY) BLOOD  1/12/2018            Who to contact     If you have questions or need follow up information about today's clinic visit or your schedule please contact [facility] directly at [phone].  Normal or non-critical lab and imaging results will be communicated to you by MyChart, letter or phone within 4 business days after the clinic has received the results. If you do not hear from us within 7 days, please contact the clinic through MyChart or phone. If you have a critical or abnormal lab result, we will notify you by phone as soon as possible.  Submit refill requests through [employer] or call your  pharmacy and they will forward the refill request to us. Please allow 3 business days for your refill to be completed.          Additional Information About Your Visit        [employer] Information     [employer] gives you secure access to your electronic health record. If you see a primary care provider, you can also send messages to your care team and make appointments. If you have questions, please call your primary care clinic.  If you do not have a primary care provider, please call [phone] and they will assist you.        Care EveryWhere ID     This is your Care EveryWhere ID. This could be used by other organizations to access your [facility] medical records  [id number]        Your Vitals Were     Pulse Temperature Respirations Pulse Oximetry BMI (Body Mass Index)       92 98.6  F (37  C) (Oral) 16 97% 31.6 kg/m2        Blood Pressure from Last 3 Encounters:   01/15/18 (!) 89/61   01/10/18 105/59   01/09/18 109/69    Weight from Last 3 Encounters:   01/15/18 86.1 kg (189 lb 14.4 oz)   01/09/18 82.3 kg (181 lb 8 oz)   01/03/18 85.7 kg (189 lb)              We Performed the Following     ABO/Rh type and screen     CBC with platelets differential          Today's Medication Changes          These changes are accurate as of: 1/15/18  9:36 AM.  If you have any questions, ask your nurse or doctor.               These medicines have changed or have updated prescriptions.        Dose/Directions    calcitRIOL 0.25 MCG capsule   Commonly known as:  ROCALTROL   This may have changed:  additional instructions   Used for:  Postsurgical hypothyroidism, Papillary carcinoma, follicular variant (H), Metastasis to cervical lymph node (H)        TAKE 2 CAPSULES BY MOUTH EVERY MORNING AND TAKE 1 CAPSULE BY MOUTH EVERY NIGHT AT BEDTIME   Quantity:  270 capsule   Refills:  3       * levothyroxine 112 MCG tablet   Commonly known as:  SYNTHROID/LEVOTHROID   This may have changed:    - how much to take  - additional instructions    Used for:  Postsurgical hypothyroidism, Papillary carcinoma, follicular variant (H), Postsurgical hypoparathyroidism (H), Thyroid cancer (H)   Changed by:  Avelina Castro MD        Mon-Sat: (2 tabs daily) Sunday: 3 tabs   Quantity:  189 tablet   Refills:  3       * levothyroxine 112 MCG tablet   Commonly known as:  SYNTHROID/LEVOTHROID   This may have changed:  Another medication with the same name was changed. Make sure you understand how and when to take each.   Used for:  High grade B-cell lymphoma (H)        Dose:  336 mcg   Take 3 tablets (336 mcg) by mouth once a week   Quantity:  30 tablet   Refills:  0       methocarbamol 750 MG tablet   Commonly known as:  ROBAXIN   This may have changed:    - when to take this  - additional instructions   Used for:  Myofascial pain        Dose:  750 mg   Take 1 tablet (750 mg) by mouth 4 times daily as needed . Ok to take a 5th Robaxin on very severe days.   Quantity:  360 tablet   Refills:  1       montelukast 10 MG tablet   Commonly known as:  SINGULAIR   This may have changed:  how much to take   Used for:  Idiopathic mast cell activation syndrome (H)        Dose:  10 mg   Take 1 tablet (10 mg) by mouth 2 times daily   Quantity:  60 tablet   Refills:  0       * Notice:  This list has 2 medication(s) that are the same as other medications prescribed for you. Read the directions carefully, and ask your doctor or other care provider to review them with you.             Primary Care Provider Office Phone # Fax #    Shahla Raul Crawley [phone]       [address] [place] [address]        Equal Access to Services     JENAE ZAMORA : Hadtimothy johnsono Sotenaali, waaxda luqadaha, qaybta kaalmada adeegyada, waxay sumla martin. So [place] [phone].    ATENCIÓN: Si habla español, tiene a stiles disposición servicios gratuitos de asistencia lingüística. Llame al [phone].    We comply with applicable federal civil  rights laws and [place] laws. We do not discriminate on the basis of race, color, national origin, age, disability, sex, sexual orientation, or gender identity.            Thank you!     Thank you for choosing [facility]  for your care. Our goal is always to provide you with excellent care. Hearing back from our patients is one way we can continue to improve our services. Please take a few minutes to complete the written survey that you may receive in the mail after your visit with us. Thank you!             Your Updated Medication List - Protect others around you: Learn how to safely use, store and throw away your medicines at www.disposemymeds.org.          This list is accurate as of: 1/15/18  9:36 AM.  Always use your most recent med list.                   Brand Name Dispense Instructions for use Diagnosis    acetaminophen 325 MG tablet    TYLENOL    100 tablet    Take 2 tablets (650 mg) by mouth every 4 hours as needed for mild pain or fever    High grade B-cell lymphoma (H)       acyclovir 400 MG tablet    ZOVIRAX    60 tablet    Take 1 tablet (400 mg) by mouth 2 times daily    High grade B-cell lymphoma (H)       allopurinol 300 MG tablet    ZYLOPRIM    30 tablet    Take 1 tablet (300 mg) by mouth daily    High grade B-cell lymphoma (H)       aluminum chloride 20 % external solution    DRYSOL    60 mL    Apply topically At Bedtime    Rash, Intertrigo       AZMACORT IN      Inhale 2 puffs into the lungs as needed        bisacodyl 5 MG EC tablet     30 tablet    Take 3 tablets (15 mg) by mouth daily as needed for constipation    Constipation, unspecified constipation type       calcitRIOL 0.25 MCG capsule    ROCALTROL    270 capsule    TAKE 2 CAPSULES BY MOUTH EVERY MORNING AND TAKE 1 CAPSULE BY MOUTH EVERY NIGHT AT BEDTIME    Postsurgical hypothyroidism, Papillary carcinoma, follicular variant (H), Metastasis to cervical lymph node (H)       CALCIUM CITRATE +D 315-250 MG-UNIT Tabs per  tablet   Generic drug:  calcium citrate-vitamin D     120 tablet    Take 2 tablets by mouth twice a week        celecoxib 200 MG capsule    celeBREX    56 capsule    TAKE ONE CAPSULE BY MOUTH TWICE DAILY    Chest wall pain       cetirizine 10 MG tablet    ZYRTEC ALLERGY    30 tablet    Take 1 tablet (10 mg) by mouth 3 times daily On hold for lab test.    High grade B-cell lymphoma (H)       COMPOUND CONTAINING CONTROLLED SUBSTANCE - PHARMACY TO MIX COMPOUNDED MEDICATION    CMPD RX    120 g    Apply small amount to affected area two times daily.    Neoplasm related pain       cromolyn 4 % ophthalmic solution    OPTICROM    10 mL    Place 1 drop into both eyes 4 times daily    Idiopathic mast cell activation syndrome (H)       cromolyn sodium 5.2 MG/ACT Aers Inhaler    NASALCROM    1 Bottle    SPRAY ONE SPRAY( 1 ML) IN NOSTRIL DAILY    Mast cell disease, systemic       [employer] GUM[employer] CHILDREN Chew      Take 5 g by mouth daily (2 gummy= 5 g =1 serving)    High grade B-cell lymphoma (H)       cyclobenzaprine 10 MG tablet    FLEXERIL    30 tablet    Take 1 tablet (10 mg) by mouth 3 times daily as needed    High grade B-cell lymphoma (H)       dexamethasone 4 MG tablet    DECADRON    4 tablet    Take 2 tablets (8 mg) by mouth daily Please take on 1/2/18 and 1/3/18.    High grade B-cell lymphoma (H)       diazepam 5 MG tablet    VALIUM    90 tablet    Take 1 tablet (5 mg) by mouth 3 times daily as needed For muscle spasms    Chest wall pain       diclofenac 1 % Gel topical gel    VOLTAREN    100 g    Apply affected area two times daily PRN using enclosed dosing card.    Myofascial pain       EPINEPHrine 0.3 MG/0.3ML injection 2-pack    EPIPEN 2-CARIDAD    0.6 mL    Inject 0.3 mLs (0.3 mg) into the muscle once as needed for anaphylaxis        ferrous sulfate 325 (65 FE) MG tablet    IRON    90 tablet    Take 1 tablet (325 mg) by mouth 3 times daily (with meals)    Status post bariatric surgery       fluconazole 200 MG  tablet    DIFLUCAN    30 tablet    Take 1 tablet (200 mg) by mouth daily    High grade B-cell lymphoma (H)       furosemide 20 MG tablet    LASIX    60 tablet    Take 1 tablet (20 mg) by mouth 2 times daily    Localized edema       hydrochlorothiazide 12.5 MG capsule    MICROZIDE    90 capsule    Take 1 capsule (12.5 mg) by mouth daily    Hypocalcemia       levofloxacin 250 MG tablet    LEVAQUIN    30 tablet    Take 1 tablet (250 mg) by mouth daily    High grade B-cell lymphoma (H)       * levothyroxine 112 MCG tablet    SYNTHROID/LEVOTHROID    189 tablet    Mon-Sat: (2 tabs daily) Sunday: 3 tabs    Postsurgical hypothyroidism, Papillary carcinoma, follicular variant (H), Postsurgical hypoparathyroidism (H), Thyroid cancer (H)       * levothyroxine 112 MCG tablet    SYNTHROID/LEVOTHROID    30 tablet    Take 3 tablets (336 mcg) by mouth once a week    High grade B-cell lymphoma (H)       lidocaine 5 % ointment    XYLOCAINE    350 g    Apply quarter size amount to chest and back up to 3 times daily as needed for pain.    Chest wall pain       lidocaine visc 2% 2.5mL/5mL & maalox/mylanta w/ simeth 2.5mL/5mL & diphenhydrAMINE 5mg/5mL Susp suspension    [facility]    360 mL    Swish and spit 10 mLs in mouth every 6 hours as needed for mouth sores    Stomatitis and mucositis, High grade B-cell lymphoma (H)       lidocaine-prilocaine cream    EMLA    30 g    Apply topically as needed (port access pain.)    Neoplasm related pain, Chest wall pain       methocarbamol 750 MG tablet    ROBAXIN    360 tablet    Take 1 tablet (750 mg) by mouth 4 times daily as needed . Ok to take a 5th Robaxin on very severe days.    Myofascial pain       montelukast 10 MG tablet    SINGULAIR    60 tablet    Take 1 tablet (10 mg) by mouth 2 times daily    Idiopathic mast cell activation syndrome (H)       morphine 30 MG 12 hr tablet    MS CONTIN    120 tablet    Take 1 tablet (30 mg) by mouth every 8 hours . Take an addition pill at  night such that your evening dose is 60mg    Neoplasm related pain       ondansetron 8 MG ODT tab    ZOFRAN-ODT    30 tablet    Take 1 tablet (8 mg) by mouth every 8 hours as needed    High grade B-cell lymphoma (H), Nausea and vomiting, intractability of vomiting not specified, unspecified vomiting type       * order for DME     2 Units    Equipment being ordered: compression stockings. 20-30 mm or 30 - 40 mm as patient can tolerate. Physical therapy to determine if they should be above or below the knee.    Venous stasis       * order for DME     2 Device    Equipment being ordered: compression bra    Malignant neoplasm of right female breast, unspecified site of breast       * order for DME     1 Units    Compression stockings, medium grade, please measure and fit. Please dispense a pair.    Peripheral edema       oxyCODONE IR 30 MG tablet    ROXICODONE    180 tablet    Take 1 tablet (30 mg) by mouth every 4 hours as needed for moderate to severe pain    High grade B-cell lymphoma (H)       polyethylene glycol powder    MIRALAX    510 g    Take 17 g (1 capful) by mouth daily    Acute constipation       potassium chloride SA 20 MEQ CR tablet    KLOR-CON    90 tablet    Take 1 tablet (20 mEq) by mouth daily    Hypokalemia       PROBIOTIC DAILY PO      Take 1 capsule by mouth daily Lacto acid bifidobacterium    Breast cancer, unspecified laterality, Thyroid cancer (H), Chronic arthralgias of knees and hips, unspecified laterality       prochlorperazine 10 MG tablet    COMPAZINE     Take 10 mg by mouth as needed        ranitidine 75 MG tablet    ZANTAC    270 tablet    Take 1 tablet (75 mg) by mouth 3 times daily    Mast cell disease, systemic       senna-docusate 8.6-50 MG per tablet    SENOKOT-S;PERICOLACE    60 tablet    Take 1-2 tablets by mouth 2 times daily as needed for constipation    Acute constipation       SUMAtriptan 50 MG tablet    IMITREX     Take 50 mg by mouth as needed        triamcinolone 0.025 %  ointment    KENALOG     Apply topically as needed        TUMS 500 MG chewable tablet   Generic drug:  calcium carbonate     180 chew tab    Take 2 tablets (1,000 mg) by mouth nightly as needed for other (cramps)        * UNABLE TO FIND      3 tablets 3 times daily MEDICATION NAME: calcium D-Glucarate  3 caps contain 180mg of elemental calcium.        * UNABLE TO FIND      Take 2 capsules by mouth 3 times daily Muscle Mag. 2 caps contain B1 20mg, B2 20mg, B6 10mg, magesium 20mg, manganese 2mg.        * UNABLE TO FIND      2 tablets 3 times daily MEDICATION NAME: Digestzymes    Thyroid cancer (H), Postsurgical hypothyroidism, Postsurgical hypoparathyroidism (H)       * UNABLE TO FIND      1 tablet daily MEDICATION NAME: Pure Encapsulations    Thyroid cancer (H), Postsurgical hypothyroidism, Postsurgical hypoparathyroidism (H)       VENTOLIN  (90 BASE) MCG/ACT Inhaler   Generic drug:  albuterol     18 g    INHALE 2 PUFFS INTO THE LUNGS EVERY 4 HOURS AS NEEDED FOR SHORTNESS OF BREATH OR DIFFICULT BREATHING OR WHEEZING    Mild intermittent asthma without complication       vitamin D3 2000 UNITS Caps     60 capsule    Take 5,000 Units by mouth daily Takes 2 tabs daily    Thyroid cancer (H), Postsurgical hypothyroidism, Papillary carcinoma, follicular variant (H), Postsurgical hypoparathyroidism (H)       * Notice:  This list has 9 medication(s) that are the same as other medications prescribed for you. Read the directions carefully, and ask your doctor or other care provider to review them with you.

## 2018-01-15 NOTE — NURSING NOTE
Chief Complaint   Patient presents with     RECHECK     No labs drawn; port de-accessed by RN     There were no vitals taken for this visit.    Pt reports that she did not get her infusion and forgot to have the port deaccessed at that time. Port deaccessed by RN.  Pt tolerated well.     Crystal Saba

## 2018-01-16 LAB
BACTERIA SPEC CULT: NO GROWTH
BACTERIA SPEC CULT: NO GROWTH
SPECIMEN SOURCE: NORMAL
SPECIMEN SOURCE: NORMAL

## 2018-01-18 ENCOUNTER — APPOINTMENT (OUTPATIENT)
Dept: LAB | Facility: CLINIC | Age: 58
End: 2018-01-18
Payer: COMMERCIAL

## 2018-01-18 ENCOUNTER — RECORDS - HEALTHEAST (OUTPATIENT)
Dept: ADMINISTRATIVE | Facility: OTHER | Age: 58
End: 2018-01-18

## 2018-01-18 ENCOUNTER — ONCOLOGY VISIT (OUTPATIENT)
Dept: ONCOLOGY | Facility: CLINIC | Age: 58
End: 2018-01-18
Attending: PHYSICIAN ASSISTANT
Payer: COMMERCIAL

## 2018-01-18 ENCOUNTER — HOSPITAL ENCOUNTER (INPATIENT)
Facility: CLINIC | Age: 58
LOS: 4 days | Discharge: HOME | End: 2018-01-22
Attending: INTERNAL MEDICINE | Admitting: INTERNAL MEDICINE
Payer: COMMERCIAL

## 2018-01-18 VITALS
SYSTOLIC BLOOD PRESSURE: 110 MMHG | WEIGHT: 194.5 LBS | OXYGEN SATURATION: 97 % | HEART RATE: 124 BPM | DIASTOLIC BLOOD PRESSURE: 70 MMHG | BODY MASS INDEX: 32.37 KG/M2 | RESPIRATION RATE: 16 BRPM | TEMPERATURE: 98.1 F

## 2018-01-18 DIAGNOSIS — C77.0 METASTASIS TO CERVICAL LYMPH NODE (H): ICD-10-CM

## 2018-01-18 DIAGNOSIS — C85.10 HIGH GRADE B-CELL LYMPHOMA (H): ICD-10-CM

## 2018-01-18 DIAGNOSIS — R11.2 NAUSEA AND VOMITING, INTRACTABILITY OF VOMITING NOT SPECIFIED, UNSPECIFIED VOMITING TYPE: ICD-10-CM

## 2018-01-18 DIAGNOSIS — E83.51 HYPOCALCEMIA: ICD-10-CM

## 2018-01-18 DIAGNOSIS — D89.42 IDIOPATHIC MAST CELL ACTIVATION SYNDROME (H): Chronic | ICD-10-CM

## 2018-01-18 DIAGNOSIS — C73 PAPILLARY CARCINOMA, FOLLICULAR VARIANT (H): ICD-10-CM

## 2018-01-18 DIAGNOSIS — G89.3 NEOPLASM RELATED PAIN: ICD-10-CM

## 2018-01-18 DIAGNOSIS — D47.02 MAST CELL DISEASE, SYSTEMIC: Chronic | ICD-10-CM

## 2018-01-18 DIAGNOSIS — M79.18 MYOFASCIAL PAIN: ICD-10-CM

## 2018-01-18 DIAGNOSIS — R60.0 LOCALIZED EDEMA: ICD-10-CM

## 2018-01-18 DIAGNOSIS — C73 THYROID CANCER (H): ICD-10-CM

## 2018-01-18 DIAGNOSIS — R60.9 EDEMA, UNSPECIFIED TYPE: Primary | ICD-10-CM

## 2018-01-18 DIAGNOSIS — E87.6 HYPOKALEMIA: ICD-10-CM

## 2018-01-18 DIAGNOSIS — R07.89 CHEST WALL PAIN: ICD-10-CM

## 2018-01-18 DIAGNOSIS — E89.2 POSTSURGICAL HYPOPARATHYROIDISM (H): ICD-10-CM

## 2018-01-18 DIAGNOSIS — E89.0 POSTSURGICAL HYPOTHYROIDISM: ICD-10-CM

## 2018-01-18 PROBLEM — C83.30 DIFFUSE LARGE B CELL LYMPHOMA (H): Status: ACTIVE | Noted: 2018-01-18

## 2018-01-18 LAB
ALBUMIN SERPL-MCNC: 2.6 G/DL (ref 3.4–5)
ALP SERPL-CCNC: 67 U/L (ref 40–150)
ALT SERPL W P-5'-P-CCNC: 18 U/L (ref 0–50)
ANION GAP SERPL CALCULATED.3IONS-SCNC: 8 MMOL/L (ref 3–14)
AST SERPL W P-5'-P-CCNC: 18 U/L (ref 0–45)
BASOPHILS # BLD AUTO: 0.1 10E9/L (ref 0–0.2)
BASOPHILS NFR BLD AUTO: 1 %
BILIRUB SERPL-MCNC: 1 MG/DL (ref 0.2–1.3)
BUN SERPL-MCNC: 15 MG/DL (ref 7–30)
CALCIUM SERPL-MCNC: 8 MG/DL (ref 8.5–10.1)
CHLORIDE SERPL-SCNC: 107 MMOL/L (ref 94–109)
CO2 SERPL-SCNC: 27 MMOL/L (ref 20–32)
CREAT SERPL-MCNC: 0.8 MG/DL (ref 0.52–1.04)
DIFFERENTIAL METHOD BLD: ABNORMAL
EOSINOPHIL # BLD AUTO: 0 10E9/L (ref 0–0.7)
EOSINOPHIL NFR BLD AUTO: 0.4 %
ERYTHROCYTE [DISTWIDTH] IN BLOOD BY AUTOMATED COUNT: 18.6 % (ref 10–15)
GFR SERPL CREATININE-BSD FRML MDRD: 73 ML/MIN/1.7M2
GLUCOSE SERPL-MCNC: 131 MG/DL (ref 70–99)
HCT VFR BLD AUTO: 30.7 % (ref 35–47)
HGB BLD-MCNC: 9.8 G/DL (ref 11.7–15.7)
IMM GRANULOCYTES # BLD: 0.1 10E9/L (ref 0–0.4)
IMM GRANULOCYTES NFR BLD: 1.8 %
LYMPHOCYTES # BLD AUTO: 0.4 10E9/L (ref 0.8–5.3)
LYMPHOCYTES NFR BLD AUTO: 8.9 %
MCH RBC QN AUTO: 33.2 PG (ref 26.5–33)
MCHC RBC AUTO-ENTMCNC: 31.9 G/DL (ref 31.5–36.5)
MCV RBC AUTO: 104 FL (ref 78–100)
MONOCYTES # BLD AUTO: 0.6 10E9/L (ref 0–1.3)
MONOCYTES NFR BLD AUTO: 11.6 %
NEUTROPHILS # BLD AUTO: 3.8 10E9/L (ref 1.6–8.3)
NEUTROPHILS NFR BLD AUTO: 76.3 %
NRBC # BLD AUTO: 0 10*3/UL
NRBC BLD AUTO-RTO: 0 /100
NT-PROBNP SERPL-MCNC: 79 PG/ML (ref 0–125)
PLATELET # BLD AUTO: 259 10E9/L (ref 150–450)
POTASSIUM SERPL-SCNC: 3.7 MMOL/L (ref 3.4–5.3)
PROT SERPL-MCNC: 5.5 G/DL (ref 6.8–8.8)
RBC # BLD AUTO: 2.95 10E12/L (ref 3.8–5.2)
SODIUM SERPL-SCNC: 142 MMOL/L (ref 133–144)
WBC # BLD AUTO: 4.9 10E9/L (ref 4–11)

## 2018-01-18 PROCEDURE — G0463 HOSPITAL OUTPT CLINIC VISIT: HCPCS | Mod: ZF

## 2018-01-18 PROCEDURE — 12000008 ZZH R&B INTERMEDIATE UMMC

## 2018-01-18 PROCEDURE — 99215 OFFICE O/P EST HI 40 MIN: CPT | Mod: ZP | Performed by: PHYSICIAN ASSISTANT

## 2018-01-18 PROCEDURE — 25000131 ZZH RX MED GY IP 250 OP 636 PS 637: Performed by: PHYSICIAN ASSISTANT

## 2018-01-18 PROCEDURE — 25000128 H RX IP 250 OP 636: Performed by: PHYSICIAN ASSISTANT

## 2018-01-18 PROCEDURE — 83880 ASSAY OF NATRIURETIC PEPTIDE: CPT | Performed by: PHYSICIAN ASSISTANT

## 2018-01-18 PROCEDURE — 25000125 ZZHC RX 250: Performed by: PHYSICIAN ASSISTANT

## 2018-01-18 PROCEDURE — 85025 COMPLETE CBC W/AUTO DIFF WBC: CPT | Performed by: PHYSICIAN ASSISTANT

## 2018-01-18 PROCEDURE — 25000128 H RX IP 250 OP 636: Mod: ZF | Performed by: PHYSICIAN ASSISTANT

## 2018-01-18 PROCEDURE — 25000132 ZZH RX MED GY IP 250 OP 250 PS 637: Performed by: PHYSICIAN ASSISTANT

## 2018-01-18 PROCEDURE — 80053 COMPREHEN METABOLIC PANEL: CPT | Performed by: PHYSICIAN ASSISTANT

## 2018-01-18 RX ORDER — HEPARIN SODIUM,PORCINE 10 UNIT/ML
5-10 VIAL (ML) INTRAVENOUS
Status: DISCONTINUED | OUTPATIENT
Start: 2018-01-18 | End: 2018-01-22 | Stop reason: HOSPADM

## 2018-01-18 RX ORDER — POTASSIUM CL/LIDO/0.9 % NACL 10MEQ/0.1L
10 INTRAVENOUS SOLUTION, PIGGYBACK (ML) INTRAVENOUS
Status: DISCONTINUED | OUTPATIENT
Start: 2018-01-18 | End: 2018-01-22 | Stop reason: HOSPADM

## 2018-01-18 RX ORDER — MONTELUKAST SODIUM 10 MG/1
20 TABLET ORAL 2 TIMES DAILY
Status: DISCONTINUED | OUTPATIENT
Start: 2018-01-18 | End: 2018-01-22 | Stop reason: HOSPADM

## 2018-01-18 RX ORDER — METHYLPREDNISOLONE SODIUM SUCCINATE 125 MG/2ML
125 INJECTION, POWDER, LYOPHILIZED, FOR SOLUTION INTRAMUSCULAR; INTRAVENOUS
Status: CANCELLED
Start: 2018-01-18

## 2018-01-18 RX ORDER — DEXAMETHASONE 4 MG/1
8 TABLET ORAL DAILY
Status: CANCELLED
Start: 2018-01-24

## 2018-01-18 RX ORDER — CYCLOBENZAPRINE HCL 10 MG
10 TABLET ORAL 3 TIMES DAILY
Status: DISCONTINUED | OUTPATIENT
Start: 2018-01-18 | End: 2018-01-22 | Stop reason: HOSPADM

## 2018-01-18 RX ORDER — ONDANSETRON 8 MG/1
16 TABLET, FILM COATED ORAL EVERY 24 HOURS
Status: CANCELLED
Start: 2018-01-19

## 2018-01-18 RX ORDER — METHOCARBAMOL 750 MG/1
750 TABLET, FILM COATED ORAL 4 TIMES DAILY
Status: DISCONTINUED | OUTPATIENT
Start: 2018-01-18 | End: 2018-01-22 | Stop reason: HOSPADM

## 2018-01-18 RX ORDER — ALLOPURINOL 300 MG/1
300 TABLET ORAL DAILY
Status: CANCELLED | OUTPATIENT
Start: 2018-01-18

## 2018-01-18 RX ORDER — MEPERIDINE HYDROCHLORIDE 25 MG/ML
25 INJECTION INTRAMUSCULAR; INTRAVENOUS; SUBCUTANEOUS EVERY 30 MIN PRN
Status: CANCELLED | OUTPATIENT
Start: 2018-01-18

## 2018-01-18 RX ORDER — MORPHINE SULFATE 60 MG/1
60 TABLET, FILM COATED, EXTENDED RELEASE ORAL AT BEDTIME
Status: DISCONTINUED | OUTPATIENT
Start: 2018-01-18 | End: 2018-01-22 | Stop reason: HOSPADM

## 2018-01-18 RX ORDER — DIPHENHYDRAMINE HCL 50 MG
50 CAPSULE ORAL ONCE
Status: COMPLETED | OUTPATIENT
Start: 2018-01-18 | End: 2018-01-18

## 2018-01-18 RX ORDER — SUMATRIPTAN 50 MG/1
50 TABLET, FILM COATED ORAL DAILY PRN
Status: DISCONTINUED | OUTPATIENT
Start: 2018-01-18 | End: 2018-01-22 | Stop reason: HOSPADM

## 2018-01-18 RX ORDER — ACETAMINOPHEN 325 MG/1
650 TABLET ORAL EVERY 4 HOURS PRN
Status: DISCONTINUED | OUTPATIENT
Start: 2018-01-18 | End: 2018-01-22 | Stop reason: HOSPADM

## 2018-01-18 RX ORDER — ACETAMINOPHEN 325 MG/1
650 TABLET ORAL ONCE
Status: CANCELLED
Start: 2018-01-18

## 2018-01-18 RX ORDER — ALBUTEROL SULFATE 90 UG/1
1-2 AEROSOL, METERED RESPIRATORY (INHALATION)
Status: DISCONTINUED | OUTPATIENT
Start: 2018-01-18 | End: 2018-01-22 | Stop reason: HOSPADM

## 2018-01-18 RX ORDER — DIPHENHYDRAMINE HYDROCHLORIDE AND LIDOCAINE HYDROCHLORIDE AND ALUMINUM HYDROXIDE AND MAGNESIUM HYDRO
10 KIT EVERY 6 HOURS PRN
Status: DISCONTINUED | OUTPATIENT
Start: 2018-01-18 | End: 2018-01-22 | Stop reason: HOSPADM

## 2018-01-18 RX ORDER — LORAZEPAM 0.5 MG/1
.5-1 TABLET ORAL EVERY 6 HOURS PRN
Status: CANCELLED
Start: 2018-01-18

## 2018-01-18 RX ORDER — LORAZEPAM 2 MG/ML
.5-1 INJECTION INTRAMUSCULAR EVERY 6 HOURS PRN
Status: CANCELLED | OUTPATIENT
Start: 2018-01-18

## 2018-01-18 RX ORDER — POTASSIUM CHLORIDE 1.5 G/1.58G
20-40 POWDER, FOR SOLUTION ORAL
Status: DISCONTINUED | OUTPATIENT
Start: 2018-01-18 | End: 2018-01-22 | Stop reason: HOSPADM

## 2018-01-18 RX ORDER — CETIRIZINE HYDROCHLORIDE 10 MG/1
10 TABLET ORAL 3 TIMES DAILY
Status: DISCONTINUED | OUTPATIENT
Start: 2018-01-18 | End: 2018-01-22 | Stop reason: HOSPADM

## 2018-01-18 RX ORDER — CROMOLYN SODIUM 5.2 MG
1 AEROSOL, SPRAY WITH PUMP (ML) NASAL DAILY
Status: DISCONTINUED | OUTPATIENT
Start: 2018-01-18 | End: 2018-01-22 | Stop reason: HOSPADM

## 2018-01-18 RX ORDER — AMOXICILLIN 250 MG
1-2 CAPSULE ORAL 2 TIMES DAILY PRN
Status: DISCONTINUED | OUTPATIENT
Start: 2018-01-18 | End: 2018-01-22 | Stop reason: HOSPADM

## 2018-01-18 RX ORDER — ALLOPURINOL 300 MG/1
300 TABLET ORAL DAILY
Status: DISCONTINUED | OUTPATIENT
Start: 2018-01-18 | End: 2018-01-22 | Stop reason: HOSPADM

## 2018-01-18 RX ORDER — OXYCODONE HYDROCHLORIDE 30 MG/1
30 TABLET ORAL EVERY 4 HOURS
Status: DISCONTINUED | OUTPATIENT
Start: 2018-01-18 | End: 2018-01-22 | Stop reason: HOSPADM

## 2018-01-18 RX ORDER — AMOXICILLIN 250 MG
2 CAPSULE ORAL 2 TIMES DAILY
Status: CANCELLED | OUTPATIENT
Start: 2018-01-18

## 2018-01-18 RX ORDER — PROCHLORPERAZINE MALEATE 10 MG
10 TABLET ORAL EVERY 6 HOURS PRN
Status: DISCONTINUED | OUTPATIENT
Start: 2018-01-18 | End: 2018-01-22 | Stop reason: HOSPADM

## 2018-01-18 RX ORDER — DIPHENHYDRAMINE HCL 25 MG
50 CAPSULE ORAL ONCE
Status: CANCELLED
Start: 2018-01-18

## 2018-01-18 RX ORDER — ONDANSETRON 8 MG/1
16 TABLET, FILM COATED ORAL
Status: COMPLETED | OUTPATIENT
Start: 2018-01-18 | End: 2018-01-22

## 2018-01-18 RX ORDER — CROMOLYN SODIUM 40 MG/ML
1 SOLUTION/ DROPS OPHTHALMIC 4 TIMES DAILY
Status: DISCONTINUED | OUTPATIENT
Start: 2018-01-18 | End: 2018-01-22 | Stop reason: HOSPADM

## 2018-01-18 RX ORDER — CALCITRIOL 0.25 UG/1
0.25 CAPSULE, LIQUID FILLED ORAL EVERY EVENING
Status: DISCONTINUED | OUTPATIENT
Start: 2018-01-18 | End: 2018-01-22 | Stop reason: HOSPADM

## 2018-01-18 RX ORDER — DIPHENHYDRAMINE HYDROCHLORIDE 50 MG/ML
50 INJECTION INTRAMUSCULAR; INTRAVENOUS
Status: CANCELLED
Start: 2018-01-18

## 2018-01-18 RX ORDER — LEVOTHYROXINE SODIUM 112 UG/1
224 TABLET ORAL
Status: DISCONTINUED | OUTPATIENT
Start: 2018-01-19 | End: 2018-01-22 | Stop reason: HOSPADM

## 2018-01-18 RX ORDER — CELECOXIB 200 MG/1
200 CAPSULE ORAL 2 TIMES DAILY
Status: DISCONTINUED | OUTPATIENT
Start: 2018-01-18 | End: 2018-01-22 | Stop reason: HOSPADM

## 2018-01-18 RX ORDER — LIDOCAINE 50 MG/G
OINTMENT TOPICAL 3 TIMES DAILY PRN
Status: DISCONTINUED | OUTPATIENT
Start: 2018-01-18 | End: 2018-01-22 | Stop reason: HOSPADM

## 2018-01-18 RX ORDER — OXYCODONE HYDROCHLORIDE 30 MG/1
30 TABLET ORAL EVERY 4 HOURS PRN
Status: DISCONTINUED | OUTPATIENT
Start: 2018-01-18 | End: 2018-01-18

## 2018-01-18 RX ORDER — METHYLPREDNISOLONE SODIUM SUCCINATE 125 MG/2ML
125 INJECTION, POWDER, LYOPHILIZED, FOR SOLUTION INTRAMUSCULAR; INTRAVENOUS
Status: DISCONTINUED | OUTPATIENT
Start: 2018-01-18 | End: 2018-01-22 | Stop reason: HOSPADM

## 2018-01-18 RX ORDER — POLYDEXTROSE 1.5 G
5 TABLET,CHEWABLE ORAL DAILY
Status: DISCONTINUED | OUTPATIENT
Start: 2018-01-18 | End: 2018-01-19

## 2018-01-18 RX ORDER — POTASSIUM CHLORIDE 750 MG/1
20 TABLET, EXTENDED RELEASE ORAL DAILY
Status: DISCONTINUED | OUTPATIENT
Start: 2018-01-19 | End: 2018-01-22 | Stop reason: HOSPADM

## 2018-01-18 RX ORDER — NALOXONE HYDROCHLORIDE 0.4 MG/ML
.1-.4 INJECTION, SOLUTION INTRAMUSCULAR; INTRAVENOUS; SUBCUTANEOUS
Status: DISCONTINUED | OUTPATIENT
Start: 2018-01-18 | End: 2018-01-22 | Stop reason: HOSPADM

## 2018-01-18 RX ORDER — BISACODYL 5 MG
5-10 TABLET, DELAYED RELEASE (ENTERIC COATED) ORAL DAILY PRN
Status: DISCONTINUED | OUTPATIENT
Start: 2018-01-18 | End: 2018-01-22 | Stop reason: HOSPADM

## 2018-01-18 RX ORDER — FUROSEMIDE 20 MG
20 TABLET ORAL 2 TIMES DAILY
Status: DISCONTINUED | OUTPATIENT
Start: 2018-01-18 | End: 2018-01-22 | Stop reason: HOSPADM

## 2018-01-18 RX ORDER — CALCITRIOL 0.5 UG/1
0.5 CAPSULE, LIQUID FILLED ORAL DAILY
Status: DISCONTINUED | OUTPATIENT
Start: 2018-01-19 | End: 2018-01-22 | Stop reason: HOSPADM

## 2018-01-18 RX ORDER — ALBUTEROL SULFATE 90 UG/1
1-2 AEROSOL, METERED RESPIRATORY (INHALATION)
Status: CANCELLED
Start: 2018-01-18

## 2018-01-18 RX ORDER — LIDOCAINE 40 MG/G
CREAM TOPICAL
Status: DISCONTINUED | OUTPATIENT
Start: 2018-01-18 | End: 2018-01-22 | Stop reason: HOSPADM

## 2018-01-18 RX ORDER — POTASSIUM CHLORIDE 7.45 MG/ML
10 INJECTION INTRAVENOUS
Status: DISCONTINUED | OUTPATIENT
Start: 2018-01-18 | End: 2018-01-22 | Stop reason: HOSPADM

## 2018-01-18 RX ORDER — POTASSIUM CHLORIDE 29.8 MG/ML
20 INJECTION INTRAVENOUS
Status: DISCONTINUED | OUTPATIENT
Start: 2018-01-18 | End: 2018-01-22 | Stop reason: HOSPADM

## 2018-01-18 RX ORDER — ACETAMINOPHEN 325 MG/1
650 TABLET ORAL ONCE
Status: COMPLETED | OUTPATIENT
Start: 2018-01-18 | End: 2018-01-18

## 2018-01-18 RX ORDER — DIPHENHYDRAMINE HYDROCHLORIDE 50 MG/ML
50 INJECTION INTRAMUSCULAR; INTRAVENOUS
Status: DISCONTINUED | OUTPATIENT
Start: 2018-01-18 | End: 2018-01-22 | Stop reason: HOSPADM

## 2018-01-18 RX ORDER — HYDROCHLOROTHIAZIDE 12.5 MG/1
12.5 CAPSULE ORAL DAILY
Status: DISCONTINUED | OUTPATIENT
Start: 2018-01-18 | End: 2018-01-22 | Stop reason: HOSPADM

## 2018-01-18 RX ORDER — ALBUTEROL SULFATE 0.83 MG/ML
2.5 SOLUTION RESPIRATORY (INHALATION)
Status: DISCONTINUED | OUTPATIENT
Start: 2018-01-18 | End: 2018-01-22 | Stop reason: HOSPADM

## 2018-01-18 RX ORDER — MEPERIDINE HYDROCHLORIDE 50 MG/ML
25 INJECTION INTRAMUSCULAR; INTRAVENOUS; SUBCUTANEOUS EVERY 30 MIN PRN
Status: DISCONTINUED | OUTPATIENT
Start: 2018-01-18 | End: 2018-01-22 | Stop reason: HOSPADM

## 2018-01-18 RX ORDER — AMOXICILLIN 250 MG
2 CAPSULE ORAL 2 TIMES DAILY
Status: DISCONTINUED | OUTPATIENT
Start: 2018-01-18 | End: 2018-01-22 | Stop reason: HOSPADM

## 2018-01-18 RX ORDER — SODIUM CHLORIDE 9 MG/ML
1000 INJECTION, SOLUTION INTRAVENOUS CONTINUOUS PRN
Status: DISCONTINUED | OUTPATIENT
Start: 2018-01-18 | End: 2018-01-22 | Stop reason: HOSPADM

## 2018-01-18 RX ORDER — ALBUTEROL SULFATE 0.83 MG/ML
2.5 SOLUTION RESPIRATORY (INHALATION)
Status: CANCELLED | OUTPATIENT
Start: 2018-01-18

## 2018-01-18 RX ORDER — LORAZEPAM 2 MG/ML
.5-1 INJECTION INTRAMUSCULAR EVERY 6 HOURS PRN
Status: DISCONTINUED | OUTPATIENT
Start: 2018-01-18 | End: 2018-01-18

## 2018-01-18 RX ORDER — CYCLOBENZAPRINE HCL 10 MG
10 TABLET ORAL 3 TIMES DAILY PRN
Status: DISCONTINUED | OUTPATIENT
Start: 2018-01-18 | End: 2018-01-18

## 2018-01-18 RX ORDER — ALBUTEROL SULFATE 90 UG/1
1-2 AEROSOL, METERED RESPIRATORY (INHALATION) EVERY 4 HOURS PRN
Status: DISCONTINUED | OUTPATIENT
Start: 2018-01-18 | End: 2018-01-22 | Stop reason: HOSPADM

## 2018-01-18 RX ORDER — PROCHLORPERAZINE MALEATE 10 MG
10 TABLET ORAL EVERY 6 HOURS PRN
Status: CANCELLED
Start: 2018-01-18

## 2018-01-18 RX ORDER — HEPARIN SODIUM (PORCINE) LOCK FLUSH IV SOLN 100 UNIT/ML 100 UNIT/ML
5 SOLUTION INTRAVENOUS EVERY 8 HOURS
Status: DISCONTINUED | OUTPATIENT
Start: 2018-01-18 | End: 2018-01-18 | Stop reason: HOSPADM

## 2018-01-18 RX ORDER — POTASSIUM CHLORIDE 750 MG/1
20-40 TABLET, EXTENDED RELEASE ORAL
Status: DISCONTINUED | OUTPATIENT
Start: 2018-01-18 | End: 2018-01-22 | Stop reason: HOSPADM

## 2018-01-18 RX ORDER — POLYETHYLENE GLYCOL 3350 17 G/17G
17 POWDER, FOR SOLUTION ORAL DAILY PRN
Status: DISCONTINUED | OUTPATIENT
Start: 2018-01-18 | End: 2018-01-21

## 2018-01-18 RX ORDER — SODIUM CHLORIDE 9 MG/ML
1000 INJECTION, SOLUTION INTRAVENOUS CONTINUOUS PRN
Status: CANCELLED
Start: 2018-01-18

## 2018-01-18 RX ORDER — DEXAMETHASONE 4 MG/1
8 TABLET ORAL DAILY
Status: DISCONTINUED | OUTPATIENT
Start: 2018-01-24 | End: 2018-01-22 | Stop reason: HOSPADM

## 2018-01-18 RX ORDER — ACYCLOVIR 400 MG/1
400 TABLET ORAL 2 TIMES DAILY
Status: DISCONTINUED | OUTPATIENT
Start: 2018-01-18 | End: 2018-01-22 | Stop reason: HOSPADM

## 2018-01-18 RX ORDER — LORAZEPAM 0.5 MG/1
.5-1 TABLET ORAL EVERY 6 HOURS PRN
Status: DISCONTINUED | OUTPATIENT
Start: 2018-01-18 | End: 2018-01-18

## 2018-01-18 RX ORDER — MORPHINE SULFATE 30 MG/1
30 TABLET, FILM COATED, EXTENDED RELEASE ORAL 2 TIMES DAILY
Status: DISCONTINUED | OUTPATIENT
Start: 2018-01-19 | End: 2018-01-22 | Stop reason: HOSPADM

## 2018-01-18 RX ORDER — DIAZEPAM 5 MG
5 TABLET ORAL 3 TIMES DAILY
Status: DISCONTINUED | OUTPATIENT
Start: 2018-01-18 | End: 2018-01-22 | Stop reason: HOSPADM

## 2018-01-18 RX ORDER — HEPARIN SODIUM,PORCINE 10 UNIT/ML
5-10 VIAL (ML) INTRAVENOUS EVERY 24 HOURS
Status: DISCONTINUED | OUTPATIENT
Start: 2018-01-18 | End: 2018-01-22 | Stop reason: HOSPADM

## 2018-01-18 RX ORDER — EPINEPHRINE 1 MG/ML
0.3 INJECTION, SOLUTION, CONCENTRATE INTRAVENOUS EVERY 5 MIN PRN
Status: DISCONTINUED | OUTPATIENT
Start: 2018-01-18 | End: 2018-01-22 | Stop reason: HOSPADM

## 2018-01-18 RX ORDER — HEPARIN SODIUM (PORCINE) LOCK FLUSH IV SOLN 100 UNIT/ML 100 UNIT/ML
5 SOLUTION INTRAVENOUS
Status: DISCONTINUED | OUTPATIENT
Start: 2018-01-18 | End: 2018-01-22 | Stop reason: HOSPADM

## 2018-01-18 RX ORDER — EPINEPHRINE 1 MG/ML
0.3 INJECTION, SOLUTION, CONCENTRATE INTRAVENOUS EVERY 5 MIN PRN
Status: CANCELLED | OUTPATIENT
Start: 2018-01-18

## 2018-01-18 RX ADMIN — ACYCLOVIR 400 MG: 400 TABLET ORAL at 20:55

## 2018-01-18 RX ADMIN — HYDROCHLOROTHIAZIDE 12.5 MG: 12.5 CAPSULE ORAL at 18:50

## 2018-01-18 RX ADMIN — DICLOFENAC SODIUM 2 G: 10 GEL TOPICAL at 20:50

## 2018-01-18 RX ADMIN — CELECOXIB 200 MG: 200 CAPSULE ORAL at 20:54

## 2018-01-18 RX ADMIN — ACETAMINOPHEN 650 MG: 325 TABLET, FILM COATED ORAL at 20:52

## 2018-01-18 RX ADMIN — CROMOLYN SODIUM 1 DROP: 40 SOLUTION/ DROPS OPHTHALMIC at 20:52

## 2018-01-18 RX ADMIN — RANITIDINE 75 MG: 75 TABLET, FILM COATED ORAL at 20:55

## 2018-01-18 RX ADMIN — RITUXIMAB 750 MG: 10 INJECTION, SOLUTION INTRAVENOUS at 22:29

## 2018-01-18 RX ADMIN — ALLOPURINOL 300 MG: 300 TABLET ORAL at 18:51

## 2018-01-18 RX ADMIN — CALCITRIOL 0.25 MCG: 0.25 CAPSULE, LIQUID FILLED ORAL at 20:54

## 2018-01-18 RX ADMIN — DIAZEPAM 5 MG: 5 TABLET ORAL at 18:51

## 2018-01-18 RX ADMIN — ACETAMINOPHEN 650 MG: 325 TABLET, FILM COATED ORAL at 20:54

## 2018-01-18 RX ADMIN — OXYCODONE HYDROCHLORIDE 30 MG: 30 TABLET ORAL at 23:08

## 2018-01-18 RX ADMIN — METHOCARBAMOL 750 MG: 750 TABLET ORAL at 20:55

## 2018-01-18 RX ADMIN — MONTELUKAST SODIUM 20 MG: 10 TABLET, COATED ORAL at 20:55

## 2018-01-18 RX ADMIN — SENNOSIDES AND DOCUSATE SODIUM 2 TABLET: 8.6; 5 TABLET ORAL at 21:48

## 2018-01-18 RX ADMIN — SODIUM CHLORIDE, PRESERVATIVE FREE 5 ML: 5 INJECTION INTRAVENOUS at 08:44

## 2018-01-18 RX ADMIN — OXYCODONE HYDROCHLORIDE 30 MG: 30 TABLET ORAL at 18:50

## 2018-01-18 RX ADMIN — PREDNISONE 60 MG: 50 TABLET ORAL at 20:53

## 2018-01-18 RX ADMIN — CYCLOBENZAPRINE HYDROCHLORIDE 10 MG: 10 TABLET, FILM COATED ORAL at 18:51

## 2018-01-18 RX ADMIN — CETIRIZINE HYDROCHLORIDE 10 MG: 10 TABLET, FILM COATED ORAL at 20:55

## 2018-01-18 RX ADMIN — MORPHINE SULFATE 60 MG: 60 TABLET, EXTENDED RELEASE ORAL at 18:51

## 2018-01-18 RX ADMIN — DIPHENHYDRAMINE HCL 50 MG: 50 CAPSULE ORAL at 20:54

## 2018-01-18 RX ADMIN — FUROSEMIDE 20 MG: 20 TABLET ORAL at 20:54

## 2018-01-18 RX ADMIN — Medication 2 TABLET: at 20:55

## 2018-01-18 RX ADMIN — ONDANSETRON HYDROCHLORIDE 16 MG: 8 TABLET, FILM COATED ORAL at 23:43

## 2018-01-18 ASSESSMENT — ACTIVITIES OF DAILY LIVING (ADL)
RETIRED_EATING: 0-->INDEPENDENT
COGNITION: 0 - NO COGNITION ISSUES REPORTED
AMBULATION: 0-->INDEPENDENT
FALL_HISTORY_WITHIN_LAST_SIX_MONTHS: YES
RETIRED_COMMUNICATION: 0-->UNDERSTANDS/COMMUNICATES WITHOUT DIFFICULTY
SWALLOWING: 0-->SWALLOWS FOODS/LIQUIDS WITHOUT DIFFICULTY
TRANSFERRING: 0-->INDEPENDENT
NUMBER_OF_TIMES_PATIENT_HAS_FALLEN_WITHIN_LAST_SIX_MONTHS: 1
BATHING: 0-->INDEPENDENT
DRESS: 0-->INDEPENDENT
TOILETING: 0-->INDEPENDENT

## 2018-01-18 ASSESSMENT — PAIN SCALES - GENERAL: PAINLEVEL: EXTREME PAIN (8)

## 2018-01-18 NOTE — IP AVS SNAPSHOT
MRN:[id number]                      After Visit Summary   1/18/2018    Tisha Arias    MRN: [id number]           Thank you!     Thank you for choosing [facility] for your care. Our goal is always to provide you with excellent care. Hearing back from our patients is one way we can continue to improve our services. Please take a few minutes to complete the written survey that you may receive in the mail after you visit with us. Thank you!        Patient Information     Date Of Birth          1960        Designated Caregiver       Most Recent Value    Caregiver    Will someone help with your care after discharge? yes    Name of designated caregiver Yomi    Phone number of caregiver [phone]    Caregiver address same address      About your hospital stay     You were admitted on:  January 18, 2018 You last received care in the:  [address] [facility]    You were discharged on:  January 22, 2018        Reason for your hospital stay       You were admitted for cycle 6 DA-REPOCH chemotherapy.  You tolerated chemo well.                  Who to Call     For medical emergencies, please call 911.  For non-urgent questions about your medical care, please call your primary care provider or clinic, [phone]          Attending Provider     Provider Specialty    Jessica Bowers MD Hematology       Primary Care Provider Office Phone # Fax #    Shahla Crawley [phone]       When to contact your care team       [facility] triage line at [phone] for temp >100.4, uncontrolled nausea/vomiting/diarrhea/constipation, unrelieved pain, bleeding not relieved with pressure, dizziness, chest pain, shortness of breath, loss of consciousness, and any new or concerning symptoms.                  After Care Instructions     Activity       Your activity upon discharge: activity as tolerated            Diet       Follow this diet upon discharge: regular diet                   Follow-up Appointments     Follow Up and recommended labs and tests       Follow up in clinic as scheduled on 1/24.                  Your next 10 appointments already scheduled     Jan 24, 2018  9:45 AM CST   [facility] with [facility]   [facility]    [address] [place] [address]  [address]  [facility] [address]   [phone]            Jan 24, 2018 10:30 AM CST   (Arrive by 10:15 AM)   Return Visit with COTY Avery   [facility]    [address] [place] [address]  [address]  [facility] [address]   [phone]            Jan 29, 2018 10:15 AM CST   [facility] Lab Draw with [facility] DRAW   [facility] ([facility]    [address] [place] [address]  [address]  [facility] [address]   [phone]            Jan 29, 2018 11:00 AM CST   Infusion 360 with UC ONCOLOGY INFUSION, UC 18 ATC   [facility]    [address] [place] [address]  [address]  [facility] [address]   [phone]            Feb 01, 2018  8:30 AM CST   [facility] Lab Draw with [facility] DRAW   [facility] Draw ([facility]    [address] [place] [address]  [address]  [facility] [address]   [phone]            Feb 05, 2018  8:30 AM CST   [facility] Lab Draw with [facility] DRAW   [facility] ([facility]    [address] [place] [address]  [address]  [facility] [address]   [phone]            Feb 08, 2018  8:30 AM CST   [facility] Lab Draw with [facility] LAB DRAW   [facility] ([facility]    [address] [place] [address]  [address]  [facility] [address]   [phone]            Feb 27, 2018  6:45 AM CST   (Arrive by 6:30 AM)   PET ONCOLOGY WHOLE BODY with UUPET1   [facility] [place] [facility] ([facility],  "[facility])    [address] [facility] [facility] [id number] [phone]           Tell your doctor:   If there is any chance you may be pregnant or if you are breastfeeding.   If you have problems lying in small spaces (claustrophobia). If you do, your doctor may give you medicine to help you relax. If you have diabetes:   Have your exam early in the morning. Your blood glucose will go up as the day goes by.   Your glucose level must be 180 or less at the start of the exam. Please take any medicines you need to ensure this blood glucose level. 24 hours before your scan: Don t do any heavy exercise. (No jogging, aerobics or other workouts.) Exercise will make your pictures less accurate. 6 hours before your scan:   Stop all food and liquids (except water).   Do not chew gum or suck on mints.   If you need to take medicine with food, you may take it with a few crackers.  Please call your Imaging Department at your exam site with any questions.            Feb 28, 2018  9:30 AM CST   (Arrive by 9:15 AM)   Return Visit with Edu Benitez MD   [facility] ([facility])    [address]  [address]  [facility] [id number] [phone]              Pending Results     Date and Time Order Name Status Description    1/19/2018 0805 CSF Culture Aerobic Bacterial Preliminary             Statement of Approval     Ordered          01/22/18 1258  I have reviewed and agree with all the recommendations and orders detailed in this document.  EFFECTIVE NOW     Approved and electronically signed by:  Destiny Mtz APRN CNP             Admission Information     Date & Time Provider Department Dept. Phone    1/18/2018 Jessica Bowers MD [address] [facility] [place] [phone]      Your Vitals Were     Blood Pressure Pulse Temperature Respirations Height Weight    108/63 (BP Location: Left arm) 83 97.2  F (36.2  C) (Oral) 20 1.651 m (5' 5\") 87.2 kg (192 lb 3.9 " oz)    Pulse Oximetry BMI (Body Mass Index)                95% 31.99 kg/m2          [employer] Information     [employer] gives you secure access to your electronic health record. If you see a primary care provider, you can also send messages to your care team and make appointments. If you have questions, please call your primary care clinic.  If you do not have a primary care provider, please call [phone] and they will assist you.        Care EveryWhere ID     This is your Care EveryWhere ID. This could be used by other organizations to access your [facility] medical records  [id number]        Equal Access to Services     [facility]: Kevin Venegas, girish ge, timmy lama, ranjan martin. So [facility] [phone].    ATENCIÓN: Si habla español, tiene a stiles disposición servicios gratuitos de asistencia lingüística. Llame al [phone].    We comply with applicable federal civil rights laws and [place] laws. We do not discriminate on the basis of race, color, national origin, age, disability, sex, sexual orientation, or gender identity.               Review of your medicines      START taking        Dose / Directions    oxyCODONE IR 30 MG tablet   Commonly known as:  ROXICODONE   Used for:  High grade B-cell lymphoma (H)        Dose:  30 mg   Start taking on:  1/27/2018   Take 1 tablet (30 mg) by mouth every 4 hours as needed for moderate to severe pain   Quantity:  30 tablet   Refills:  0         CONTINUE these medicines which may have CHANGED, or have new prescriptions. If we are uncertain of the size of tablets/capsules you have at home, strength may be listed as something that might have changed.        Dose / Directions    calcitRIOL 0.25 MCG capsule   Commonly known as:  ROCALTROL   This may have changed:  additional instructions   Used for:  Postsurgical hypothyroidism, Papillary carcinoma, follicular variant (H), Metastasis to cervical lymph node  (H)        TAKE 2 CAPSULES BY MOUTH EVERY MORNING AND TAKE 1 CAPSULE BY MOUTH EVERY NIGHT AT BEDTIME   Quantity:  90 capsule   Refills:  0       celecoxib 200 MG capsule   Commonly known as:  celeBREX   This may have changed:  See the new instructions.   Used for:  Chest wall pain        Dose:  200 mg   Take 1 capsule (200 mg) by mouth 2 times daily   Quantity:  56 capsule   Refills:  0       dexamethasone 4 MG tablet   Commonly known as:  DECADRON   This may have changed:  additional instructions   Used for:  High grade B-cell lymphoma (H)        Dose:  8 mg   Start taking on:  1/23/2018   Take 2 tablets (8 mg) by mouth daily for 2 days Please take on 1/23 and 1/24.   Quantity:  4 tablet   Refills:  0       levothyroxine 112 MCG tablet   Commonly known as:  SYNTHROID/LEVOTHROID   This may have changed:    - how much to take  - additional instructions  - Another medication with the same name was removed. Continue taking this medication, and follow the directions you see here.   Used for:  Postsurgical hypothyroidism, Papillary carcinoma, follicular variant (H), Postsurgical hypoparathyroidism (H), Thyroid cancer (H)        Mon-Sat: (2 tabs daily) Sunday: 3 tabs   Quantity:  189 tablet   Refills:  3       methocarbamol 750 MG tablet   Commonly known as:  ROBAXIN   This may have changed:    - when to take this  - reasons to take this  - additional instructions   Used for:  Myofascial pain        Dose:  750 mg   Take 1 tablet (750 mg) by mouth 4 times daily as needed . Ok to take a 5th Robaxin on very severe days.   Quantity:  360 tablet   Refills:  1       montelukast 10 MG tablet   Commonly known as:  SINGULAIR   This may have changed:  how much to take   Used for:  Idiopathic mast cell activation syndrome (H)        Dose:  10 mg   Take 1 tablet (10 mg) by mouth 2 times daily   Quantity:  60 tablet   Refills:  0         CONTINUE these medicines which have NOT CHANGED        Dose / Directions    acetaminophen 325 MG  tablet   Commonly known as:  TYLENOL   Used for:  High grade B-cell lymphoma (H)        Dose:  650 mg   Take 2 tablets (650 mg) by mouth every 4 hours as needed for mild pain or fever   Quantity:  100 tablet   Refills:  0       acyclovir 400 MG tablet   Commonly known as:  ZOVIRAX   Indication:  Prophylaxis of Herpes Simplex   Used for:  High grade B-cell lymphoma (H)        Dose:  400 mg   Take 1 tablet (400 mg) by mouth 2 times daily   Quantity:  60 tablet   Refills:  0       aluminum chloride 20 % external solution   Commonly known as:  DRYSOL   Used for:  Rash, Intertrigo        Apply topically At Bedtime   Quantity:  60 mL   Refills:  3       AZMACORT IN        Dose:  2 puff   Inhale 2 puffs into the lungs as needed   Refills:  0       bisacodyl 5 MG EC tablet   Used for:  Constipation, unspecified constipation type        Dose:  15 mg   Take 3 tablets (15 mg) by mouth daily as needed for constipation   Quantity:  30 tablet   Refills:  0       calcium citrate-vitamin D 315-250 MG-UNIT Tabs per tablet   Commonly known as:  CALCIUM CITRATE +D   Used for:  High grade B-cell lymphoma (H), Hypocalcemia        Dose:  2 tablet   Take 2 tablets by mouth twice a week   Quantity:  20 tablet   Refills:  0       cetirizine 10 MG tablet   Commonly known as:  ZYRTEC ALLERGY   Used for:  High grade B-cell lymphoma (H)        Dose:  10 mg   Take 1 tablet (10 mg) by mouth 3 times daily On hold for lab test.   Quantity:  90 tablet   Refills:  0       COMPOUND CONTAINING CONTROLLED SUBSTANCE - PHARMACY TO MIX COMPOUNDED MEDICATION   Commonly known as:  CMPD RX   Used for:  Neoplasm related pain        Apply small amount to affected area two times daily.   Quantity:  120 g   Refills:  6       cromolyn 4 % ophthalmic solution   Commonly known as:  OPTICROM   Used for:  Idiopathic mast cell activation syndrome (H)        Dose:  1 drop   Place 1 drop into both eyes 4 times daily   Quantity:  10 mL   Refills:  0       cromolyn sodium  5.2 MG/ACT Aers Inhaler   Commonly known as:  NASALCROM   Used for:  Mast cell disease, systemic        SPRAY ONE SPRAY( 1 ML) IN NOSTRIL DAILY   Quantity:  1 Bottle   Refills:  6       [employer] F[employer]ER GUMMY [employer] CHILDREN Chew   Used for:  High grade B-cell lymphoma (H)        Dose:  5 g   Take 5 g by mouth daily (2 gummy= 5 g =1 serving)   Refills:  0       cyclobenzaprine 10 MG tablet   Commonly known as:  FLEXERIL   Used for:  High grade B-cell lymphoma (H)        Dose:  10 mg   Take 1 tablet (10 mg) by mouth 3 times daily as needed   Quantity:  30 tablet   Refills:  0       diazepam 5 MG tablet   Commonly known as:  VALIUM   Used for:  Chest wall pain        Dose:  5 mg   Take 1 tablet (5 mg) by mouth 3 times daily as needed For muscle spasms   Quantity:  90 tablet   Refills:  2       diclofenac 1 % Gel topical gel   Commonly known as:  VOLTAREN   Used for:  Myofascial pain        Apply affected area two times daily PRN using enclosed dosing card.   Quantity:  100 g   Refills:  0       EPINEPHrine 0.3 MG/0.3ML injection 2-pack   Commonly known as:  EPIPEN 2-CARIDAD        Dose:  0.3 mg   Inject 0.3 mLs (0.3 mg) into the muscle once as needed for anaphylaxis   Quantity:  0.6 mL   Refills:  3       fluconazole 200 MG tablet   Commonly known as:  DIFLUCAN   Indication:  Fungal Infection Prophylaxis   Used for:  High grade B-cell lymphoma (H)        Dose:  200 mg   Take 1 tablet (200 mg) by mouth daily   Quantity:  30 tablet   Refills:  0       furosemide 20 MG tablet   Commonly known as:  LASIX   Used for:  Localized edema        Dose:  20 mg   Take 1 tablet (20 mg) by mouth 2 times daily   Quantity:  60 tablet   Refills:  0       hydrochlorothiazide 12.5 MG capsule   Commonly known as:  MICROZIDE   Used for:  Hypocalcemia        Dose:  12.5 mg   Take 1 capsule (12.5 mg) by mouth daily   Quantity:  30 capsule   Refills:  0       levofloxacin 250 MG tablet   Commonly known as:  LEVAQUIN   Indication:  bacterial ppx   Used  for:  High grade B-cell lymphoma (H)        Dose:  250 mg   Take 1 tablet (250 mg) by mouth daily   Quantity:  30 tablet   Refills:  0       lidocaine 5 % ointment   Commonly known as:  XYLOCAINE   Used for:  Chest wall pain        Apply quarter size amount to chest and back up to 3 times daily as needed for pain.   Quantity:  350 g   Refills:  1       lidocaine visc 2% 2.5mL/5mL & maalox/mylanta w/ simeth 2.5mL/5mL & diphenhydrAMINE 5mg/5mL Susp suspension   Commonly known as:  [facility] Mouth[facility]   Used for:  Stomatitis and mucositis, High grade B-cell lymphoma (H)        Dose:  10 mL   Swish and spit 10 mLs in mouth every 6 hours as needed for mouth sores   Quantity:  360 mL   Refills:  1       lidocaine-prilocaine cream   Commonly known as:  EMLA   Used for:  Neoplasm related pain, Chest wall pain        Apply topically as needed (port access pain.)   Quantity:  30 g   Refills:  1       morphine 30 MG 12 hr tablet   Commonly known as:  MS CONTIN   Used for:  Neoplasm related pain        Dose:  30 mg   Start taking on:  1/27/2018   Take 1 tablet (30 mg) by mouth every 8 hours . Take an addition pill at night such that your evening dose is 60mg   Quantity:  120 tablet   Refills:  0       ondansetron 8 MG ODT tab   Commonly known as:  ZOFRAN-ODT   Used for:  High grade B-cell lymphoma (H), Nausea and vomiting, intractability of vomiting not specified, unspecified vomiting type        Dose:  8 mg   Take 1 tablet (8 mg) by mouth every 8 hours as needed   Quantity:  30 tablet   Refills:  1       * order for DME   Used for:  Venous stasis        Equipment being ordered: compression stockings. 20-30 mm or 30 - 40 mm as patient can tolerate. Physical therapy to determine if they should be above or below the knee.   Quantity:  2 Units   Refills:  4       * order for DME   Used for:  Malignant neoplasm of right female breast, unspecified site of breast        Equipment being ordered: compression bra   Quantity:  2  Device   Refills:  1       * order for DME   Used for:  Peripheral edema        Compression stockings, medium grade, please measure and fit. Please dispense a pair.   Quantity:  1 Units   Refills:  0       polyethylene glycol powder   Commonly known as:  MIRALAX   Used for:  Acute constipation        Dose:  1 capful   Take 17 g (1 capful) by mouth daily   Quantity:  510 g   Refills:  0       potassium chloride SA 20 MEQ CR tablet   Commonly known as:  KLOR-CON   Used for:  Hypokalemia        Dose:  20 mEq   Take 1 tablet (20 mEq) by mouth daily   Quantity:  30 tablet   Refills:  0       PROBIOTIC DAILY PO   Used for:  Breast cancer, unspecified laterality, Thyroid cancer (H), Chronic arthralgias of knees and hips, unspecified laterality        Dose:  1 capsule   Take 1 capsule by mouth daily Lacto acid bifidobacterium   Refills:  0       prochlorperazine 10 MG tablet   Commonly known as:  COMPAZINE        Dose:  10 mg   Take 10 mg by mouth as needed   Refills:  3       ranitidine 75 MG tablet   Commonly known as:  ZANTAC   Used for:  Mast cell disease, systemic        Dose:  75 mg   Take 1 tablet (75 mg) by mouth 3 times daily   Quantity:  90 tablet   Refills:  0       senna-docusate 8.6-50 MG per tablet   Commonly known as:  SENOKOT-S;PERICOLACE   Used for:  Acute constipation        Dose:  1-2 tablet   Take 1-2 tablets by mouth 2 times daily as needed for constipation   Quantity:  60 tablet   Refills:  0       SUMAtriptan 50 MG tablet   Commonly known as:  IMITREX        Dose:  50 mg   Take 50 mg by mouth as needed   Refills:  3       triamcinolone 0.025 % ointment   Commonly known as:  KENALOG        Apply topically as needed   Refills:  3       * UNABLE TO FIND        Dose:  3 tablet   3 tablets 3 times daily MEDICATION NAME: calcium D-Glucarate  3 caps contain 180mg of elemental calcium.   Refills:  0       * UNABLE TO FIND        Dose:  2 capsule   Take 2 capsules by mouth 3 times daily Muscle Mag. 2 caps  contain B1 20mg, B2 20mg, B6 10mg, magesium 20mg, manganese 2mg.   Refills:  0       * UNABLE TO FIND   Used for:  Thyroid cancer (H), Postsurgical hypothyroidism, Postsurgical hypoparathyroidism (H)        Dose:  2 tablet   2 tablets 3 times daily MEDICATION NAME: Digestzymes   Refills:  0       * UNABLE TO FIND   Indication:  P5P50   Used for:  Thyroid cancer (H), Postsurgical hypothyroidism, Postsurgical hypoparathyroidism (H)        Dose:  1 tablet   1 tablet daily MEDICATION NAME: Pure Encapsulations   Refills:  0       VENTOLIN  (90 BASE) MCG/ACT Inhaler   Used for:  Mild intermittent asthma without complication   Generic drug:  albuterol        INHALE 2 PUFFS INTO THE LUNGS EVERY 4 HOURS AS NEEDED FOR SHORTNESS OF BREATH OR DIFFICULT BREATHING OR WHEEZING   Quantity:  18 g   Refills:  3       vitamin D3 2000 UNITS Caps   Used for:  Thyroid cancer (H), Postsurgical hypothyroidism, Papillary carcinoma, follicular variant (H), Postsurgical hypoparathyroidism (H)        Dose:  5000 Units   Take 5,000 Units by mouth daily Takes 2 tabs daily   Quantity:  60 capsule   Refills:  0       * Notice:  This list has 7 medication(s) that are the same as other medications prescribed for you. Read the directions carefully, and ask your doctor or other care provider to review them with you.      STOP taking     allopurinol 300 MG tablet   Commonly known as:  ZYLOPRIM                Where to get your medicines      These medications were sent to [facility] - [place], [place] - [address] [address]     Phone:  [phone]     acyclovir 400 MG tablet    calcitRIOL 0.25 MCG capsule    calcium citrate-vitamin D 315-250 MG-UNIT Tabs per tablet    celecoxib 200 MG capsule    cetirizine 10 MG tablet    cromolyn 4 % ophthalmic solution    cromolyn sodium 5.2 MG/ACT Aers Inhaler    cyclobenzaprine 10 MG tablet    dexamethasone 4 MG tablet    fluconazole 200 MG tablet     furosemide 20 MG tablet    hydrochlorothiazide 12.5 MG capsule    levofloxacin 250 MG tablet    methocarbamol 750 MG tablet    montelukast 10 MG tablet    ondansetron 8 MG ODT tab    potassium chloride SA 20 MEQ CR tablet    ranitidine 75 MG tablet    vitamin D3 2000 UNITS Caps         Some of these will need a paper prescription and others can be bought over the counter. Ask your nurse if you have questions.     Bring a paper prescription for each of these medications     morphine 30 MG 12 hr tablet    oxyCODONE IR 30 MG tablet               ANTIBIOTIC INSTRUCTION     You've Been Prescribed an Antibiotic - Now What?  Your healthcare team thinks that you or your loved one might have an infection. Some infections can be treated with antibiotics, which are powerful, life-saving drugs. Like all medications, antibiotics have side effects and should only be used when necessary. There are some important things you should know about your antibiotic treatment.      Your healthcare team may run tests before you start taking an antibiotic.    Your team may take samples (e.g., from your blood, urine or other areas) to run tests to look for bacteria. These test can be important to determine if you need an antibiotic at all and, if you do, which antibiotic will work best.      Within a few days, your healthcare team might change or even stop your antibiotic.    Your team may start you on an antibiotic while they are working to find out what is making you sick.    Your team might change your antibiotic because test results show that a different antibiotic would be better to treat your infection.    In some cases, once your team has more information, they learn that you do not need an antibiotic at all. They may find out that you don't have an infection, or that the antibiotic you're taking won't work against your infection. For example, an infection caused by a virus can't be treated with antibiotics. Staying on an antibiotic  when you don't need it is more likely to be harmful than helpful.      You may experience side effects from your antibiotic.    Like all medications, antibiotics have side effects. Some of these can be serious.    Let you healthcare team know if you have any known allergies when you are admitted to the hospital.    One significant side effect of nearly all antibiotics is the risk of severe and sometimes deadly diarrhea caused by Clostridium difficile (C. Difficile). This occurs when a person takes antibiotics because some good germs are destroyed. Antibiotic use allows C. diificile to take over, putting patients at high risk for this serious infection.    As a patient or caregiver, it is important to understand your or your loved one's antibiotic treatment. It is especially important for caregivers to speak up when patients can't speak for themselves. Here are some important questions to ask your healthcare team.    What infection is this antibiotic treating and how do you know I have that infection?    What side effects might occur from this antibiotic?    How long will I need to take this antibiotic?    Is it safe to take this antibiotic with other medications or supplements (e.g., vitamins) that I am taking?     Are there any special directions I need to know about taking this antibiotic? For example, should I take it with food?    How will I be monitored to know whether my infection is responding to the antibiotic?    What tests may help to make sure the right antibiotic is prescribed for me?      Information provided by:  www.cdc.gov/getsmart  U.S. Department of Health and Human Services  Centers for disease Control and Prevention  National Center for Emerging and Zoonotic Infectious Diseases  Division of Healthcare Quality Promotion         Protect others around you: Learn how to safely use, store and throw away your medicines at www.disposemymeds.org.             Medication List: This is a list of all your  medications and when to take them. Check marks below indicate your daily home schedule. Keep this list as a reference.      Medications           Morning Afternoon Evening Bedtime As Needed    acetaminophen 325 MG tablet   Commonly known as:  TYLENOL   Take 2 tablets (650 mg) by mouth every 4 hours as needed for mild pain or fever   Last time this was given:  650 mg on 1/19/2018  6:31 PM                                acyclovir 400 MG tablet   Commonly known as:  ZOVIRAX   Take 1 tablet (400 mg) by mouth 2 times daily   Last time this was given:  400 mg on 1/22/2018  7:59 AM                                aluminum chloride 20 % external solution   Commonly known as:  DRYSOL   Apply topically At Bedtime                                AZMACORT IN   Inhale 2 puffs into the lungs as needed                                bisacodyl 5 MG EC tablet   Take 3 tablets (15 mg) by mouth daily as needed for constipation                                calcitRIOL 0.25 MCG capsule   Commonly known as:  ROCALTROL   TAKE 2 CAPSULES BY MOUTH EVERY MORNING AND TAKE 1 CAPSULE BY MOUTH EVERY NIGHT AT BEDTIME   Last time this was given:  0.5 mcg on 1/22/2018  7:59 AM                                calcium citrate-vitamin D 315-250 MG-UNIT Tabs per tablet   Commonly known as:  CALCIUM CITRATE +D   Take 2 tablets by mouth twice a week   Last time this was given:  2 tablets on 1/22/2018  7:59 AM                                celecoxib 200 MG capsule   Commonly known as:  celeBREX   Take 1 capsule (200 mg) by mouth 2 times daily   Last time this was given:  200 mg on 1/22/2018  7:59 AM                                cetirizine 10 MG tablet   Commonly known as:  ZYRTEC ALLERGY   Take 1 tablet (10 mg) by mouth 3 times daily On hold for lab test.   Last time this was given:  10 mg on 1/22/2018  7:59 AM                                COMPOUND CONTAINING CONTROLLED SUBSTANCE - PHARMACY TO MIX COMPOUNDED MEDICATION   Commonly known as:  CMPD RX    Apply small amount to affected area two times daily.                                cromolyn 4 % ophthalmic solution   Commonly known as:  OPTICROM   Place 1 drop into both eyes 4 times daily   Last time this was given:  1 drop on 1/22/2018  8:01 AM                                cromolyn sodium 5.2 MG/ACT Aers Inhaler   Commonly known as:  NASALCROM   SPRAY ONE SPRAY( 1 ML) IN NOSTRIL DAILY   Last time this was given:  1 mL on 1/22/2018  8:01 AM                                [employer] Chew   Take 5 g by mouth daily (2 gummy= 5 g =1 serving)                                cyclobenzaprine 10 MG tablet   Commonly known as:  FLEXERIL   Take 1 tablet (10 mg) by mouth 3 times daily as needed   Last time this was given:  10 mg on 1/22/2018  7:59 AM                                dexamethasone 4 MG tablet   Commonly known as:  DECADRON   Take 2 tablets (8 mg) by mouth daily for 2 days Please take on 1/23 and 1/24.   Start taking on:  1/23/2018                                diazepam 5 MG tablet   Commonly known as:  VALIUM   Take 1 tablet (5 mg) by mouth 3 times daily as needed For muscle spasms   Last time this was given:  5 mg on 1/22/2018  7:59 AM                                diclofenac 1 % Gel topical gel   Commonly known as:  VOLTAREN   Apply affected area two times daily PRN using enclosed dosing card.   Last time this was given:  2 g on 1/22/2018  8:00 AM                                EPINEPHrine 0.3 MG/0.3ML injection 2-pack   Commonly known as:  EPIPEN 2-CARIDAD   Inject 0.3 mLs (0.3 mg) into the muscle once as needed for anaphylaxis                                fluconazole 200 MG tablet   Commonly known as:  DIFLUCAN   Take 1 tablet (200 mg) by mouth daily                                furosemide 20 MG tablet   Commonly known as:  LASIX   Take 1 tablet (20 mg) by mouth 2 times daily   Last time this was given:  20 mg on 1/22/2018  7:59 AM                                 hydrochlorothiazide 12.5 MG capsule   Commonly known as:  MICROZIDE   Take 1 capsule (12.5 mg) by mouth daily   Last time this was given:  12.5 mg on 1/22/2018  7:59 AM                                levofloxacin 250 MG tablet   Commonly known as:  LEVAQUIN   Take 1 tablet (250 mg) by mouth daily                                levothyroxine 112 MCG tablet   Commonly known as:  SYNTHROID/LEVOTHROID   Mon-Sat: (2 tabs daily) Sunday: 3 tabs   Last time this was given:  224 mcg on 1/22/2018  7:59 AM                                lidocaine 5 % ointment   Commonly known as:  XYLOCAINE   Apply quarter size amount to chest and back up to 3 times daily as needed for pain.                                lidocaine visc 2% 2.5mL/5mL & maalox/mylanta w/ simeth 2.5mL/5mL & diphenhydrAMINE 5mg/5mL Susp suspension   Commonly known as:  [facility]   Swish and spit 10 mLs in mouth every 6 hours as needed for mouth sores                                lidocaine-prilocaine cream   Commonly known as:  EMLA   Apply topically as needed (port access pain.)                                methocarbamol 750 MG tablet   Commonly known as:  ROBAXIN   Take 1 tablet (750 mg) by mouth 4 times daily as needed . Ok to take a 5th Robaxin on very severe days.   Last time this was given:  750 mg on 1/22/2018 12:08 PM                                montelukast 10 MG tablet   Commonly known as:  SINGULAIR   Take 1 tablet (10 mg) by mouth 2 times daily   Last time this was given:  20 mg on 1/22/2018  8:00 AM                                morphine 30 MG 12 hr tablet   Commonly known as:  MS CONTIN   Take 1 tablet (30 mg) by mouth every 8 hours . Take an addition pill at night such that your evening dose is 60mg   Start taking on:  1/27/2018   Last time this was given:  30 mg on 1/22/2018 12:08 PM                                ondansetron 8 MG ODT tab   Commonly known as:  ZOFRAN-ODT   Take 1 tablet (8 mg) by mouth every 8 hours as needed                                 * order for DME   Equipment being ordered: compression stockings. 20-30 mm or 30 - 40 mm as patient can tolerate. Physical therapy to determine if they should be above or below the knee.                                * order for DME   Equipment being ordered: compression bra                                * order for DME   Compression stockings, medium grade, please measure and fit. Please dispense a pair.                                oxyCODONE IR 30 MG tablet   Commonly known as:  ROXICODONE   Take 1 tablet (30 mg) by mouth every 4 hours as needed for moderate to severe pain   Start taking on:  1/27/2018   Last time this was given:  30 mg on 1/22/2018 12:08 PM                                polyethylene glycol powder   Commonly known as:  MIRALAX   Take 17 g (1 capful) by mouth daily                                potassium chloride SA 20 MEQ CR tablet   Commonly known as:  KLOR-CON   Take 1 tablet (20 mEq) by mouth daily   Last time this was given:  40 mEq on 1/22/2018 10:17 AM                                PROBIOTIC DAILY PO   Take 1 capsule by mouth daily Lacto acid bifidobacterium                                prochlorperazine 10 MG tablet   Commonly known as:  COMPAZINE   Take 10 mg by mouth as needed                                ranitidine 75 MG tablet   Commonly known as:  ZANTAC   Take 1 tablet (75 mg) by mouth 3 times daily   Last time this was given:  75 mg on 1/22/2018  8:00 AM                                senna-docusate 8.6-50 MG per tablet   Commonly known as:  SENOKOT-S;PERICOLACE   Take 1-2 tablets by mouth 2 times daily as needed for constipation   Last time this was given:  1 tablet on 1/22/2018  7:59 AM                                SUMAtriptan 50 MG tablet   Commonly known as:  IMITREX   Take 50 mg by mouth as needed                                triamcinolone 0.025 % ointment   Commonly known as:  KENALOG   Apply topically as needed                                 * UNABLE TO FIND   3 tablets 3 times daily MEDICATION NAME: calcium D-Glucarate  3 caps contain 180mg of elemental calcium.   Last time this was given:  1/22/2018  8:00 AM                                * UNABLE TO FIND   Take 2 capsules by mouth 3 times daily Muscle Mag. 2 caps contain B1 20mg, B2 20mg, B6 10mg, magesium 20mg, manganese 2mg.   Last time this was given:  1/22/2018  8:00 AM                                * UNABLE TO FIND   2 tablets 3 times daily MEDICATION NAME: Digestzymes   Last time this was given:  1/22/2018  8:00 AM                                * UNABLE TO FIND   1 tablet daily MEDICATION NAME: Pure Encapsulations   Last time this was given:  1/22/2018  8:00 AM                                VENTOLIN  (90 BASE) MCG/ACT Inhaler   INHALE 2 PUFFS INTO THE LUNGS EVERY 4 HOURS AS NEEDED FOR SHORTNESS OF BREATH OR DIFFICULT BREATHING OR WHEEZING   Generic drug:  albuterol                                vitamin D3 2000 UNITS Caps   Take 5,000 Units by mouth daily Takes 2 tabs daily   Last time this was given:  5,000 Units on 1/21/2018  7:52 PM                                * Notice:  This list has 7 medication(s) that are the same as other medications prescribed for you. Read the directions carefully, and ask your doctor or other care provider to review them with you.

## 2018-01-18 NOTE — MR AVS SNAPSHOT
After Visit Summary   1/18/2018    Tisha Arias    MRN: [id number]           Patient Information     Date Of Birth          1960        Visit Information        Provider Department      1/18/2018 8:30 AM Rashida Jaffe PA-C [facility]        Today's Diagnoses     Edema, unspecified type    -  1    High grade B-cell lymphoma (H)           Follow-ups after your visit        Your next 10 appointments already scheduled     Feb 27, 2018  6:45 AM CST   (Arrive by 6:30 AM)   PET ONCOLOGY WHOLE BODY with UUPET1   [facility] [place] PET CT ([facility] [place])    [address] [address] [phone]           Tell your doctor:   If there is any chance you may be pregnant or if you are breastfeeding.   If you have problems lying in small spaces (claustrophobia). If you do, your doctor may give you medicine to help you relax. If you have diabetes:   Have your exam early in the morning. Your blood glucose will go up as the day goes by.   Your glucose level must be 180 or less at the start of the exam. Please take any medicines you need to ensure this blood glucose level. 24 hours before your scan: Don t do any heavy exercise. (No jogging, aerobics or other workouts.) Exercise will make your pictures less accurate. 6 hours before your scan:   Stop all food and liquids (except water).   Do not chew gum or suck on mints.   If you need to take medicine with food, you may take it with a few crackers.  Please call your Imaging Department at your exam site with any questions.            Feb 28, 2018 10:15 AM CST   RETURN ONC with Garima Sauceda MD   [facility] ([facility])    [address]  [address]  [facility] [id number] [phone]            May 21, 2018  4:20 PM CDT   (Arrive by 4:05 PM)   RETURN ENDOCRINE with Avelina Castro MD   [facility]  Endocrinology ([facility])    [address]  3rd [address]  [facility] [id number] [phone]            Jun 11, 2018  4:00 PM CDT   (Arrive by 3:45 PM)   Return Visit with Shahla Crawley MD   [facility] ([facility])    [address]  [address]  [place] [id number] [phone]              Who to contact     If you have questions or need follow up information about today's clinic visit or your schedule please contact [facility] directly at [phone].  Normal or non-critical lab and imaging results will be communicated to you by MyChart, letter or phone within 4 business days after the clinic has received the results. If you do not hear from us within 7 days, please contact the clinic through [employer] or phone. If you have a critical or abnormal lab result, we will notify you by phone as soon as possible.  Submit refill requests through [employer] or call your pharmacy and they will forward the refill request to us. Please allow 3 business days for your refill to be completed.          Additional Information About Your Visit        [employer] Information     [employer] gives you secure access to your electronic health record. If you see a primary care provider, you can also send messages to your care team and make appointments. If you have questions, please call your primary care clinic.  If you do not have a primary care provider, please call [phone] and they will assist you.        Care EveryWhere ID     This is your Care EveryWhere ID. This could be used by other organizations to access your [place] medical records  [id number]        Your Vitals Were     Pulse Temperature Respirations Pulse Oximetry BMI (Body Mass Index)       124 98.1  F (36.7  C) 16 97% 32.37 kg/m2        Blood Pressure from Last 3 Encounters:   01/18/18 110/70   01/15/18 101/65   01/10/18 105/59    Weight from Last 3  Encounters:   01/18/18 88.2 kg (194 lb 8 oz)   01/15/18 86.1 kg (189 lb 14.4 oz)   01/09/18 82.3 kg (181 lb 8 oz)              We Performed the Following     CBC with platelets differential     Comprehensive metabolic panel     N terminal pro BNP outpatient          Today's Medication Changes          These changes are accurate as of: 1/18/18 11:43 AM.  If you have any questions, ask your nurse or doctor.               These medicines have changed or have updated prescriptions.        Dose/Directions    calcitRIOL 0.25 MCG capsule   Commonly known as:  ROCALTROL   This may have changed:  additional instructions   Used for:  Postsurgical hypothyroidism, Papillary carcinoma, follicular variant (H), Metastasis to cervical lymph node (H)        TAKE 2 CAPSULES BY MOUTH EVERY MORNING AND TAKE 1 CAPSULE BY MOUTH EVERY NIGHT AT BEDTIME   Quantity:  270 capsule   Refills:  3       * levothyroxine 112 MCG tablet   Commonly known as:  SYNTHROID/LEVOTHROID   This may have changed:    - how much to take  - additional instructions   Used for:  Postsurgical hypothyroidism, Papillary carcinoma, follicular variant (H), Postsurgical hypoparathyroidism (H), Thyroid cancer (H)   Changed by:  Avelina Castro MD        Mon-Sat: (2 tabs daily) Sunday: 3 tabs   Quantity:  189 tablet   Refills:  3       * levothyroxine 112 MCG tablet   Commonly known as:  SYNTHROID/LEVOTHROID   This may have changed:  Another medication with the same name was changed. Make sure you understand how and when to take each.   Used for:  High grade B-cell lymphoma (H)        Dose:  336 mcg   Take 3 tablets (336 mcg) by mouth once a week   Quantity:  30 tablet   Refills:  0       methocarbamol 750 MG tablet   Commonly known as:  ROBAXIN   This may have changed:    - when to take this  - additional instructions   Used for:  Myofascial pain        Dose:  750 mg   Take 1 tablet (750 mg) by mouth 4 times daily as needed . Ok to take a 5th Robaxin on very severe  days.   Quantity:  360 tablet   Refills:  1       montelukast 10 MG tablet   Commonly known as:  SINGULAIR   This may have changed:  how much to take   Used for:  Idiopathic mast cell activation syndrome (H)        Dose:  10 mg   Take 1 tablet (10 mg) by mouth 2 times daily   Quantity:  60 tablet   Refills:  0       * Notice:  This list has 2 medication(s) that are the same as other medications prescribed for you. Read the directions carefully, and ask your doctor or other care provider to review them with you.             Primary Care Provider Office Phone # Fax #    Shahla Crawley [phone]       [address]  [facility] [address]        Equal Access to Services     RODRIGO ZAMORA : Kevin Venegas, girish ge, timmy lama, ranjan martin. So [facility] [phone].    ATENCIÓN: Si habla español, tiene a stiles disposición servicios gratuitos de asistencia lingüística. Llame al [phone].    We comply with applicable federal civil rights laws and [place] laws. We do not discriminate on the basis of race, color, national origin, age, disability, sex, sexual orientation, or gender identity.            Thank you!     Thank you for choosing [facility]  for your care. Our goal is always to provide you with excellent care. Hearing back from our patients is one way we can continue to improve our services. Please take a few minutes to complete the written survey that you may receive in the mail after your visit with us. Thank you!             Your Updated Medication List - Protect others around you: Learn how to safely use, store and throw away your medicines at www.disposemymeds.org.          This list is accurate as of: 1/18/18 11:43 AM.  Always use your most recent med list.                   Brand Name Dispense Instructions for use Diagnosis    acetaminophen 325 MG tablet    TYLENOL    100 tablet    Take 2  tablets (650 mg) by mouth every 4 hours as needed for mild pain or fever    High grade B-cell lymphoma (H)       acyclovir 400 MG tablet    ZOVIRAX    60 tablet    Take 1 tablet (400 mg) by mouth 2 times daily    High grade B-cell lymphoma (H)       allopurinol 300 MG tablet    ZYLOPRIM    30 tablet    Take 1 tablet (300 mg) by mouth daily    High grade B-cell lymphoma (H)       aluminum chloride 20 % external solution    DRYSOL    60 mL    Apply topically At Bedtime    Rash, Intertrigo       AZMACORT IN      Inhale 2 puffs into the lungs as needed        bisacodyl 5 MG EC tablet     30 tablet    Take 3 tablets (15 mg) by mouth daily as needed for constipation    Constipation, unspecified constipation type       calcitRIOL 0.25 MCG capsule    ROCALTROL    270 capsule    TAKE 2 CAPSULES BY MOUTH EVERY MORNING AND TAKE 1 CAPSULE BY MOUTH EVERY NIGHT AT BEDTIME    Postsurgical hypothyroidism, Papillary carcinoma, follicular variant (H), Metastasis to cervical lymph node (H)       CALCIUM CITRATE +D 315-250 MG-UNIT Tabs per tablet   Generic drug:  calcium citrate-vitamin D     120 tablet    Take 2 tablets by mouth twice a week        celecoxib 200 MG capsule    celeBREX    56 capsule    TAKE ONE CAPSULE BY MOUTH TWICE DAILY    Chest wall pain       cetirizine 10 MG tablet    ZYRTEC ALLERGY    30 tablet    Take 1 tablet (10 mg) by mouth 3 times daily On hold for lab test.    High grade B-cell lymphoma (H)       COMPOUND CONTAINING CONTROLLED SUBSTANCE - PHARMACY TO MIX COMPOUNDED MEDICATION    CMPD RX    120 g    Apply small amount to affected area two times daily.    Neoplasm related pain       cromolyn 4 % ophthalmic solution    OPTICROM    10 mL    Place 1 drop into both eyes 4 times daily    Idiopathic mast cell activation syndrome (H)       cromolyn sodium 5.2 MG/ACT Aers Inhaler    NASALCROM    1 Bottle    SPRAY ONE SPRAY( 1 ML) IN NOSTRIL DAILY    Mast cell disease, systemic       [employer] GUMMY BE[employer] CHILDREN  Chew      Take 5 g by mouth daily (2 gummy= 5 g =1 serving)    High grade B-cell lymphoma (H)       cyclobenzaprine 10 MG tablet    FLEXERIL    30 tablet    Take 1 tablet (10 mg) by mouth 3 times daily as needed    High grade B-cell lymphoma (H)       dexamethasone 4 MG tablet    DECADRON    4 tablet    Take 2 tablets (8 mg) by mouth daily Please take on 1/2/18 and 1/3/18.    High grade B-cell lymphoma (H)       diazepam 5 MG tablet    VALIUM    90 tablet    Take 1 tablet (5 mg) by mouth 3 times daily as needed For muscle spasms    Chest wall pain       diclofenac 1 % Gel topical gel    VOLTAREN    100 g    Apply affected area two times daily PRN using enclosed dosing card.    Myofascial pain       EPINEPHrine 0.3 MG/0.3ML injection 2-pack    EPIPEN 2-CARIDAD    0.6 mL    Inject 0.3 mLs (0.3 mg) into the muscle once as needed for anaphylaxis        ferrous sulfate 325 (65 FE) MG tablet    IRON    90 tablet    Take 1 tablet (325 mg) by mouth 3 times daily (with meals)    Status post bariatric surgery       fluconazole 200 MG tablet    DIFLUCAN    30 tablet    Take 1 tablet (200 mg) by mouth daily    High grade B-cell lymphoma (H)       furosemide 20 MG tablet    LASIX    60 tablet    Take 1 tablet (20 mg) by mouth 2 times daily    Localized edema       hydrochlorothiazide 12.5 MG capsule    MICROZIDE    90 capsule    Take 1 capsule (12.5 mg) by mouth daily    Hypocalcemia       levofloxacin 250 MG tablet    LEVAQUIN    30 tablet    Take 1 tablet (250 mg) by mouth daily    High grade B-cell lymphoma (H)       * levothyroxine 112 MCG tablet    SYNTHROID/LEVOTHROID    189 tablet    Mon-Sat: (2 tabs daily) Sunday: 3 tabs    Postsurgical hypothyroidism, Papillary carcinoma, follicular variant (H), Postsurgical hypoparathyroidism (H), Thyroid cancer (H)       * levothyroxine 112 MCG tablet    SYNTHROID/LEVOTHROID    30 tablet    Take 3 tablets (336 mcg) by mouth once a week    High grade B-cell lymphoma (H)       lidocaine 5 %  ointment    XYLOCAINE    350 g    Apply quarter size amount to chest and back up to 3 times daily as needed for pain.    Chest wall pain       lidocaine visc 2% 2.5mL/5mL & maalox/mylanta w/ simeth 2.5mL/5mL & diphenhydrAMINE 5mg/5mL Susp suspension    [facility]    360 mL    Swish and spit 10 mLs in mouth every 6 hours as needed for mouth sores    Stomatitis and mucositis, High grade B-cell lymphoma (H)       lidocaine-prilocaine cream    EMLA    30 g    Apply topically as needed (port access pain.)    Neoplasm related pain, Chest wall pain       methocarbamol 750 MG tablet    ROBAXIN    360 tablet    Take 1 tablet (750 mg) by mouth 4 times daily as needed . Ok to take a 5th Robaxin on very severe days.    Myofascial pain       montelukast 10 MG tablet    SINGULAIR    60 tablet    Take 1 tablet (10 mg) by mouth 2 times daily    Idiopathic mast cell activation syndrome (H)       morphine 30 MG 12 hr tablet    MS CONTIN    120 tablet    Take 1 tablet (30 mg) by mouth every 8 hours . Take an addition pill at night such that your evening dose is 60mg    Neoplasm related pain       ondansetron 8 MG ODT tab    ZOFRAN-ODT    30 tablet    Take 1 tablet (8 mg) by mouth every 8 hours as needed    High grade B-cell lymphoma (H), Nausea and vomiting, intractability of vomiting not specified, unspecified vomiting type       * order for DME     2 Units    Equipment being ordered: compression stockings. 20-30 mm or 30 - 40 mm as patient can tolerate. Physical therapy to determine if they should be above or below the knee.    Venous stasis       * order for DME     2 Device    Equipment being ordered: compression bra    Malignant neoplasm of right female breast, unspecified site of breast       * order for DME     1 Units    Compression stockings, medium grade, please measure and fit. Please dispense a pair.    Peripheral edema       oxyCODONE IR 30 MG tablet    ROXICODONE    180 tablet    Take 1 tablet (30 mg) by  mouth every 4 hours as needed for moderate to severe pain    High grade B-cell lymphoma (H)       polyethylene glycol powder    MIRALAX    510 g    Take 17 g (1 capful) by mouth daily    Acute constipation       potassium chloride SA 20 MEQ CR tablet    KLOR-CON    90 tablet    Take 1 tablet (20 mEq) by mouth daily    Hypokalemia       PROBIOTIC DAILY PO      Take 1 capsule by mouth daily Lacto acid bifidobacterium    Breast cancer, unspecified laterality, Thyroid cancer (H), Chronic arthralgias of knees and hips, unspecified laterality       prochlorperazine 10 MG tablet    COMPAZINE     Take 10 mg by mouth as needed        ranitidine 75 MG tablet    ZANTAC    270 tablet    Take 1 tablet (75 mg) by mouth 3 times daily    Mast cell disease, systemic       senna-docusate 8.6-50 MG per tablet    SENOKOT-S;PERICOLACE    60 tablet    Take 1-2 tablets by mouth 2 times daily as needed for constipation    Acute constipation       SUMAtriptan 50 MG tablet    IMITREX     Take 50 mg by mouth as needed        triamcinolone 0.025 % ointment    KENALOG     Apply topically as needed        TUMS 500 MG chewable tablet   Generic drug:  calcium carbonate     180 chew tab    Take 2 tablets (1,000 mg) by mouth nightly as needed for other (cramps)        * UNABLE TO FIND      3 tablets 3 times daily MEDICATION NAME: calcium D-Glucarate  3 caps contain 180mg of elemental calcium.        * UNABLE TO FIND      Take 2 capsules by mouth 3 times daily Muscle Mag. 2 caps contain B1 20mg, B2 20mg, B6 10mg, magesium 20mg, manganese 2mg.        * UNABLE TO FIND      2 tablets 3 times daily MEDICATION NAME: Digestzymes    Thyroid cancer (H), Postsurgical hypothyroidism, Postsurgical hypoparathyroidism (H)       * UNABLE TO FIND      1 tablet daily MEDICATION NAME: Pure Encapsulations    Thyroid cancer (H), Postsurgical hypothyroidism, Postsurgical hypoparathyroidism (H)       VENTOLIN  (90 BASE) MCG/ACT Inhaler   Generic drug:  albuterol      18 g    INHALE 2 PUFFS INTO THE LUNGS EVERY 4 HOURS AS NEEDED FOR SHORTNESS OF BREATH OR DIFFICULT BREATHING OR WHEEZING    Mild intermittent asthma without complication       vitamin D3 2000 UNITS Caps     60 capsule    Take 5,000 Units by mouth daily Takes 2 tabs daily    Thyroid cancer (H), Postsurgical hypothyroidism, Papillary carcinoma, follicular variant (H), Postsurgical hypoparathyroidism (H)       * Notice:  This list has 9 medication(s) that are the same as other medications prescribed for you. Read the directions carefully, and ask your doctor or other care provider to review them with you.

## 2018-01-18 NOTE — PROGRESS NOTES
DATE/TIME  (DOT-TD, DOT-NOW) CHEMO CHECK ACTIVITY (REGIMEN & DOSE CHECK, DAY, DOSE #, NAME OF CHEMO #1)  CHEMO DRUG #2  CHEMO DRUG #3 NAME OF RN #1 (USE DOT-ME HERE) NAME OF RN#2 (2ND RN TO LOG IN SEPARATELY)   1/18/2018  5:58 PM   Chemo protocol check   Sonia Hernandez ([place])     1/18/2018  9:20 PM   Rituxan dose # 1    Sonia Hernandez ([place])     01/19/18  12:16 AM   Dose #1 Etoposide Dose #1 Vincristine/doxorubicin   Teresita Harrison Score    1/19/2018  12:26 PM   Methotrexate intrathecal dose doube check.   Tamiko Garcia      1/19/2018  5:50 PM   Dose #2 Etoposide  Dose #2 vincristine/doxorubicin  Madeleine Hardy ([place])    1/20/2018  8:43 PM Dose #3 Etoposide  Dose #3 vincristine/doxorubicin  Jenny Jordan     1/21/2018 5:27 PM  Etoposide dose #4/4 double check   Lyndsey Arias RN  [place]   1/21/2018  6:02 PM Doxorubicin/Vincristine dose #4 double check   Sherlyn Arias RN    1/22/2018  2:29 PM   Cytoxan Day #5   Veronika Archibald

## 2018-01-18 NOTE — H&P
"[facility], [place]    History and Physical  Hospitalist       Date of Admission:  1/18/2018  Date of Service (when I saw the patient): 01/18/18    Assessment & Plan   Tisha Arias is a 57 year old female with high grade DLBCL and PMHx significant for breast cancer s/p bilateral mastectomies & BENJIE/BSO, papillary thyroid cancer s/p total thyroidectomy, chronic chest wall pain, muscle spasms, asthma, MCAS and h/o Rachael en Y gastric bypass, who is admitted for Cycle 6 of DA-R-EPOCH.       Problem List:    HEME/ONC  # High grade B cell lymphoma. \"Double hit-like\". Primary is Dr. Sauceda. Patient diagnosed August 2017. Lytic lesion of right 10th rib with extension. Disease localized above the diaphragm in thoracic and paravertebral soft tissue and mediastinal lymphadenopathy. 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 Staging LP and BMBx were negative for lymphoma. Initiated cycle 1 DA-R-EPOCH on 10/6/17. Has completed 5 cycles. She now presents for cycle 6 DA-R-EPOCH, appropriate for 20% dose increase of cytoxan, etoposide and doxorubicin per protocol. Had PET showing CR on 12/21/17.     REGIMEN: Cycle 6 DA-R-EPOCH (Day 1=1/18/18).    Rituxan 750 mg D1  Prednisone 60 mg bid D1-5  Etoposide 145 mg CIVI D1-4  Vincristine 0.8 mg CIVI D1-4  Doxorubicin 29 mg CIVI D1-4  Cytoxan 2170 mg D5  IT MTX (day to be determined by availability for scheduling under XR)  Premeds: Tylenol, benadryl, zofran 16 mg D1-5, Emend 150 mg D5  Dex 8 mg D6 and 7  Neulasta D6      #Anemia. 2/2 chemotherapy. Do not anticipate any transfusion needs during this hospitalization  - Hgb >7 and plt >10k       #Stage 1, ER/OK-positive, HER2-negative breast cancer. Follows with Dr. Crawley. Diagnosed in 2011. Oncotype recurrence score of 11 (7% recurrence risk after 5 years of tamoxifen). S/p " bilateral mastectomies and BENJIE/BSO in 2012. Was initially on Arimidex ([id number]) but changed to Tamoxifen due to arthralgias. Tamoxifen held since 10/5 and continue to hold with chemotherapy. Last f/u Dr. Crawley was on 11/27/17.      # Papillary thyroid cancer. Follows with Dr. Castro. Diagnosed in 2013. S/p total thyroidectomy and CND. Received ETOH treatment August 2014, October 2014 and December 2014. Has post-surgical hypothyroidism.  -Continue PTA Levothyroxine.  -Continue PTA calcitriol.      GI  # H/o Rachael en Y gastric bypass. Likely affecting absorption, thus patient is on multiple supplements.  - Continue supplements (calcium citrate-vitamin D, vitamin D3, magnesium citrate). Also has iron deficiency anemia likely from poor absorption, continue 325mg PO ferrous sulfate TID (resume outpatient).       Pain  # Chronic chest wall pain after mastectomy.   -Continue MS Contin 30/30/60 mg TID.  -Oxycodone 15-30 mg q4hrs.  -Celebrex 200 BID.   -Lidocaine cream prn.      #Chronic muscle cramps.   - Robaxin 750 mg QID, Valium 5 mg TID, Flexeril tid.      ID  #Anti-infectives.  - Continue PTA acyclovir  - Held Levaquin and fluconazole ppx as patient is not neutropenic.   - Resumed on discharge or when neutropenic      CV  #Lymphedema of lower extremities.  - On HCTZ & lasix PTA       PULM  #Asthma, allergies.   -Continue PTA singular, zyrtec.      FEN  -IVF per chemo regimen.  -PTA electrolyte replacements and lyte protocol.  -RDAT.      PPx  -VTE: lovenox held r/t possible LP on 1/19 (will need to call XR in the AM to see if it can be scheduled for 1/19, otherwise on 1/22).  -PUD: PTA ranitidine.  -Bowels: PTA docusate and miralax.      FULL CODE     DISPO: After completion of chemo, anticipate 5 day hospitalization    Kady Kaufman PA-C  Hematology/Oncology  Pager: [phone]    Primary Care Physician   Shahla Crawley    Chief Complaint   Admission for cycle #6 of DA-R-EPOCH    History is obtained from  the patient and chart review    History of Present Illness   Tisha rAias is a 57 year old female with high grade DLBCL and PMHx significant for breast cancer s/p bilateral mastectomies & BENJIE/BSO, papillary thyroid cancer s/p total thyroidectomy, chronic chest wall pain, muscle spasms, asthma, MCAS and h/o Rachael en Y gastric bypass, who is admitted for Cycle 6 of DA-R-EPOCH. Patient diagnosed August 2017. Lytic lesion of right 10th rib with extension. Disease localized above the diaphragm in thoracic and paravertebral soft tissue and mediastinal lymphadenopathy. 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 Staging LP and BMBx were negative for lymphoma. Had PET showing CR on 12/21/17. She states she has been very tired but is otherwise doing well. Has had some mouth sores in the past but none now. No fevers or infections. She is eating ok. No n/v/d. Constipation today. No dysuria. No new CP, no SOB or cough. No dysuria. C/o b/l leg swelling since her diuretics were held last week. No dizziness or falls. No focal areas of weakness. No new skin rashes.       Past Medical History    I have reviewed this patient's medical history and updated it with pertinent information if needed.   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 No real need for Epipen for this alone.     GDM (gestational diabetes mellitus)     w first pregnancy only     Gestational hypertension      Lymphedema     jackson arms, chest     Migraine      Neuropathy associated with cancer (H)      Papillary  carcinoma, follicular variant (H) 2013    pT3, N1, Mx, thyroid     Post-surgical hypothyroidism      Renal disease     kidney stones     Rhinitis, allergic to other allergen      Right Breast mass and microcalcifications 2011     Seasonal allergic conjunctivitis      Seasonal allergic rhinitis 11 skin tests pos. for: dog/M/T/G/W--NEGATIVE FOOD TESTS FOR: shrimp, crab, lobster, coconut       Past Surgical History   I have reviewed this patient's surgical history and updated it with pertinent information if needed.  Past Surgical History:   Procedure Laterality Date     BACK SURGERY  ,    Bulging disc w nerve root impigment     BIOPSY BREAST      right     BIOPSY BREAST  11    right, core and sterotactic     BONE MARROW BIOPSY, BONE SPECIMEN, NEEDLE/TROCAR Left 10/3/2017    Procedure: BIOPSY BONE MARROW;  Bone Marrow Biopsy with aspirate;  Surgeon: Amelia Watkins PA-C;  Location: [facility] OR     BYPASS GASTRIC, CHOLECYSTECTOMY, COMBINED       C LAPAROSCOPIC GASTRIC RESTRICTIVE PX, W/GASTRIC BYPASS/ GAMALIEL-EN-Y, < 150CM      Gamaliel en Y?      SECTION       COLONOSCOPY      normal     COSMETIC SURGERY  2012    Final Stage of Mastectomy     DAVINCI HYSTERECTOMY TOTAL, BILATERAL SALPINGO-OOPHORECTOMY, COMBINED  2012    Procedure: COMBINED DAVINCI HYSTERECTOMY TOTAL, SALPINGO-OOPHORECTOMY;  Davinci Total Laparoscopic Hysterectomy, Bilateral Salpingo Oophorectomy, Pelvic Washings, Cystoscopy;  Surgeon: Evie Sheikh MD;  Location: [facility] OR     ENT SURGERY       ESOPHAGOSCOPY, GASTROSCOPY, DUODENOSCOPY (EGD), COMBINED N/A 2017    Procedure: COMBINED ENDOSCOPIC ULTRASOUND, ESOPHAGOSCOPY, GASTROSCOPY, DUODENOSCOPY (EGD), FINE NEEDLE ASPIRATE/BIOPSY;  Esophagogastroduodenoscopy, Endoscopic Ultrasound, with fine needle biopsy aspirate;  Surgeon: Patrick Robles MD;  Location: [facility] OR     GI SURGERY  2007    Gamaliel-en Y w cholecystectomy     GYN SURGERY   1/6/89,1/10/92    2 c-sections     HYSTERECTOMY, PAP NO LONGER INDICATED  12/12    DeVinci assister lap hyst with BSO     INSERT PORT VASCULAR ACCESS Right 10/4/2017    Procedure: INSERT PORT VASCULAR ACCESS;  Port Placement;  Surgeon: Jc Goodman PA-C;  Location: [facility] OR     IRRIGATION AND DEBRIDEMENT BREAST  3/1/2012    Procedure:IRRIGATION AND DEBRIDEMENT BREAST; Irrigation and Debridement, Wound Closure Right Breast; Surgeon:JUNG GUILLEN; Location:[facility] OR     MASTECTOMY, RECONSTRUCT BREAST, COMBINED  1/30/2012    Procedure:COMBINED MASTECTOMY, RECONSTRUCT BREAST; Bilateral Mastectomies, Right Axillary [place] Node Biopsy, Bilateral Breast Reconstruction with Tissue Expanders, Reconstruction of inframammary fold, bilateral pain management systems; Surgeon:ANUPAM CAMPBELL; Location:[facility] OR     RECONSTRUCT BREAST  8/31/2012    Procedure: RECONSTRUCT BREAST;  Bilateral 2nd stage breast reconstruction, revision,       SOFT TISSUE SURGERY  1-30-12    Mastectomy w severe myofascial pain syndrome     THYROIDECTOMY  7/10/2013    Procedure: THYROIDECTOMY;  Total Thyroidectomy with central neck dissection;  Surgeon: Indiana Sneed MD;  Location: [facility] OR     TONSILLECTOMY      childhood       Prior to Admission Medications   Prior to Admission Medications   Prescriptions Last Dose Informant Patient Reported? Taking?   COMPOUND CONTAINING CONTROLLED SUBSTANCE (CMPD RX) - PHARMACY TO MIX COMPOUNDED MEDICATION   No No   Sig: Apply small amount to affected area two times daily.   [employer] FIBER GUMMY BEARS CHILDREN CHEW   No No   Sig: Take 5 g by mouth daily (2 gummy= 5 g =1 serving)   Cholecalciferol (VITAMIN D3) 2000 UNITS CAPS  Self Yes No   Sig: Take 5,000 Units by mouth daily Takes 2 tabs daily   EPINEPHrine (EPIPEN 2-CARIDAD) 0.3 MG/0.3ML injection  Self No No   Sig: Inject 0.3 mLs (0.3 mg) into the muscle once as needed for anaphylaxis   Probiotic Product (PROBIOTIC DAILY PO)  Self Yes No   Sig: Take 1 capsule  by mouth daily Lacto acid bifidobacterium   SUMAtriptan (IMITREX) 50 MG tablet   Yes No   Sig: Take 50 mg by mouth as needed   Triamcinolone Acetonide (AZMACORT IN)  Self Yes No   Sig: Inhale 2 puffs into the lungs as needed   UNABLE TO FIND  Self Yes No   Si tablets 3 times daily MEDICATION NAME: Digestzymes    UNABLE TO FIND  Self Yes No   Si tablet daily MEDICATION NAME: Pure Encapsulations   UNABLE TO FIND  Self Yes No   Sig: 3 tablets 3 times daily MEDICATION NAME: calcium D-Glucarate   3 caps contain 180mg of elemental calcium.   UNABLE TO FIND   Yes No   Sig: Take 2 capsules by mouth 3 times daily Muscle Mag. 2 caps contain B1 20mg, B2 20mg, B6 10mg, magesium 20mg, manganese 2mg.   VENTOLIN  (90 BASE) MCG/ACT Inhaler  Self No No   Sig: INHALE 2 PUFFS INTO THE LUNGS EVERY 4 HOURS AS NEEDED FOR SHORTNESS OF BREATH OR DIFFICULT BREATHING OR WHEEZING   acetaminophen (TYLENOL) 325 MG tablet   No No   Sig: Take 2 tablets (650 mg) by mouth every 4 hours as needed for mild pain or fever   acyclovir (ZOVIRAX) 400 MG tablet   No No   Sig: Take 1 tablet (400 mg) by mouth 2 times daily   allopurinol (ZYLOPRIM) 300 MG tablet   No No   Sig: Take 1 tablet (300 mg) by mouth daily   aluminum chloride (DRYSOL) 20 % external solution  Self No No   Sig: Apply topically At Bedtime   bisacodyl (DULCOLAX) 5 MG EC tablet   No No   Sig: Take 3 tablets (15 mg) by mouth daily as needed for constipation   calcitRIOL (ROCALTROL) 0.25 MCG capsule   No No   Sig: TAKE 2 CAPSULES BY MOUTH EVERY MORNING AND TAKE 1 CAPSULE BY MOUTH EVERY NIGHT AT BEDTIME   Patient taking differently: TAKE 2 CAPSULES BY MOUTH EVERY MORNING AND TAKE 1 CAPSULE BY MOUTH at noon   calcium carbonate (TUMS) 500 MG chewable tablet  Self Yes No   Sig: Take 2 tablets (1,000 mg) by mouth nightly as needed for other (cramps)   calcium citrate-vitamin D (CALCIUM CITRATE +D) 315-250 MG-UNIT TABS  Self Yes No   Sig: Take 2 tablets by mouth twice a week     celecoxib (CELEBREX) 200 MG capsule   No No   Sig: TAKE ONE CAPSULE BY MOUTH TWICE DAILY    cetirizine (ZYRTEC ALLERGY) 10 MG tablet   No No   Sig: Take 1 tablet (10 mg) by mouth 3 times daily On hold for lab test.   cromolyn (OPTICROM) 4 % ophthalmic solution  Self No No   Sig: Place 1 drop into both eyes 4 times daily   cromolyn sodium (NASALCROM) 5.2 MG/ACT AERS Inhaler   No No   Sig: SPRAY ONE SPRAY( 1 ML) IN NOSTRIL DAILY   cyclobenzaprine (FLEXERIL) 10 MG tablet   No No   Sig: Take 1 tablet (10 mg) by mouth 3 times daily as needed   dexamethasone (DECADRON) 4 MG tablet   No No   Sig: Take 2 tablets (8 mg) by mouth daily Please take on 1/2/18 and 1/3/18.   diazepam (VALIUM) 5 MG tablet   No No   Sig: Take 1 tablet (5 mg) by mouth 3 times daily as needed For muscle spasms   diclofenac (VOLTAREN) 1 % GEL topical gel   No No   Sig: Apply affected area two times daily PRN using enclosed dosing card.   ferrous sulfate (IRON) 325 (65 FE) MG tablet   No No   Sig: Take 1 tablet (325 mg) by mouth 3 times daily (with meals)   fluconazole (DIFLUCAN) 200 MG tablet   No No   Sig: Take 1 tablet (200 mg) by mouth daily   furosemide (LASIX) 20 MG tablet   No No   Sig: Take 1 tablet (20 mg) by mouth 2 times daily   hydrochlorothiazide (MICROZIDE) 12.5 MG capsule  Self No No   Sig: Take 1 capsule (12.5 mg) by mouth daily   levofloxacin (LEVAQUIN) 250 MG tablet   No No   Sig: Take 1 tablet (250 mg) by mouth daily   levothyroxine (SYNTHROID/LEVOTHROID) 112 MCG tablet  Self No No   Sig: Mon-Sat: (2 tabs daily) Sunday: 3 tabs   Patient taking differently: 112 mcg Mon-Sat: (2 tabs daily) Sunday: 3 tabs   levothyroxine (SYNTHROID/LEVOTHROID) 112 MCG tablet   No No   Sig: Take 3 tablets (336 mcg) by mouth once a week   lidocaine (XYLOCAINE) 5 % ointment   No No   Sig: Apply quarter size amount to chest and back up to 3 times daily as needed for pain.   lidocaine-prilocaine (EMLA) cream   No No   Sig: Apply topically as needed (port  access pain.)   magic mouthwash suspension (diphenhydrAMINE, lidocaine, aluminum-magnesium & simethicone)   No No   Sig: Swish and spit 10 mLs in mouth every 6 hours as needed for mouth sores   methocarbamol (ROBAXIN) 750 MG tablet   No No   Sig: Take 1 tablet (750 mg) by mouth 4 times daily as needed . Ok to take a 5th Robaxin on very severe days.   Patient taking differently: Take 750 mg by mouth 4 times daily . Ok to take a 5th Robaxin on very severe days.   montelukast (SINGULAIR) 10 MG tablet   No No   Sig: Take 1 tablet (10 mg) by mouth 2 times daily   Patient taking differently: Take 20 mg by mouth 2 times daily    morphine (MS CONTIN) 30 MG 12 hr tablet   No No   Sig: Take 1 tablet (30 mg) by mouth every 8 hours . Take an addition pill at night such that your evening dose is 60mg   ondansetron (ZOFRAN-ODT) 8 MG ODT tab   No No   Sig: Take 1 tablet (8 mg) by mouth every 8 hours as needed   order for DME   No No   Sig: Equipment being ordered: compression stockings. 20-30 mm or 30 - 40 mm as patient can tolerate. Physical therapy to determine if they should be above or below the knee.   order for DME   No No   Sig: Equipment being ordered: compression bra   order for DME   No No   Sig: Compression stockings, medium grade, please measure and fit. Please dispense a pair.   oxyCODONE IR (ROXICODONE) 30 MG tablet   No No   Sig: Take 1 tablet (30 mg) by mouth every 4 hours as needed for moderate to severe pain   polyethylene glycol (MIRALAX) powder   No No   Sig: Take 17 g (1 capful) by mouth daily   potassium chloride SA (KLOR-CON) 20 MEQ CR tablet   No No   Sig: Take 1 tablet (20 mEq) by mouth daily   prochlorperazine (COMPAZINE) 10 MG tablet   Yes No   Sig: Take 10 mg by mouth as needed   ranitidine (ZANTAC) 75 MG tablet  Self No No   Sig: Take 1 tablet (75 mg) by mouth 3 times daily   senna-docusate (SENOKOT-S;PERICOLACE) 8.6-50 MG per tablet   No No   Sig: Take 1-2 tablets by mouth 2 times daily as needed for  constipation   triamcinolone (KENALOG) 0.025 % ointment   Yes No   Sig: Apply topically as needed      Facility-Administered Medications: None     Allergies   Allergies   Allergen Reactions     Baclofen Other (See Comments) and Unknown     Other reaction(s): Edema, chest pain, seizures.      No Clinical Screening - See Comments Shortness Of Breath, Palpitations, Anaphylaxis, Itching, Swelling, Difficulty breathing and Rash     Sukhjinder wipes- oral allergy -  July 2015: throat tightness from a Chinese herbal medicine Guillen Tong Blank Barry Tran     Serotonin Anxiety, Other (See Comments) and Swelling     Seizures      Tree Nuts [Nuts] Shortness Of Breath     Amitriptyline Hcl Swelling     Birch Trees      Potatoes, carrots, cherries, celery, apple, pears, plums, peaches, parsnip, kiwi, hazelnuts, and apricots,      Blue Dyes Itching     Headaches       Cymbalta Other (See Comments)     Flushing, tremor/muscle twitching and edema     Gabapentin Other (See Comments)     edema  Systemic edema, weaned off from Feb to March per Dr. Dowd.    edema     Grass      Mugwort [Artemisia Vulgaris]      Various spices     Ragweeds      Melons, bananas, cucumbers, zucchini.     Topamax      Nortriptyline Itching, Visual Disturbance, Swelling, GI Disturbance, Anxiety, Other (See Comments) and Nausea     Other reaction(s): Swelling       Social History   I have reviewed this patient's social history and updated it with pertinent information if needed. Tisha Arias  reports that she has never smoked. She has never used smokeless tobacco. She reports that she does not drink alcohol or use illicit drugs.    Family History   I have reviewed this patient's family history and updated it with pertinent information if needed.   Family History   Problem Relation Age of Onset     Allergies Mother      Arthritis Mother      CANCER Mother      uterine/uterine     Hypertension Mother      on HCTZ     Hyperlipidemia Mother      CEREBROVASCULAR  DISEASE Mother      s/p brain surgery     Breast Cancer Mother      Depression Mother      Anxiety Disorder Mother      Anesthesia Reaction Mother      Asthma Mother      Skin Cancer Mother      HEART DISEASE Father      DIABETES Father      Coronary Artery Disease Father      Hypertension Father      Hyperlipidemia Father      CEREBROVASCULAR DISEASE Father      Multiple strokes     Obesity Father      DIABETES Brother      Depression Brother      Hypertension Brother      CANCER Maternal Grandfather      pancreatic cancer/stomach cancer     Prostate Cancer Maternal Grandfather      Prostrate and Bladder     Other Cancer Maternal Grandfather      Pancreatic 1988, Bladder 1977     CANCER Maternal Grandmother      uterine     Breast Cancer Maternal Grandmother      Skin Cancer Maternal Grandmother      CANCER Brother 57     pancreatic cancer     DIABETES Brother      Breast Cancer Cousin      Grand mothers sister     Other Cancer Brother      Stage 3 Pancreatic 2-2015     Depression Brother      Asthma Brother      Obesity Brother      Anesthesia Reaction Other      H/a, itching, drug interactions     Asthma Other      CANCER Brother      Pancreatic      Thyroid Disease No family hx of        Review of Systems   The 10 point Review of Systems is negative other than noted in the HPI or here.     Physical Exam                      Vital Signs with Ranges  Temp:  [98.1  F (36.7  C)] 98.1  F (36.7  C)  Pulse:  [124] 124  Resp:  [16] 16  BP: (110)/(70) 110/70  SpO2:  [97 %] 97 %  0 lbs 0 oz    Constitutional: Well appearing, no acute distress  Eyes: PERRL. EOMs intact, sclera anicteric, conjunctiva clear  HEENT: normocephalic, atraumatic, alopecia, MMM, no mucositis or thrush  Respiratory: clear to auscultation bilaterally  Cardiovascular: RRR, S1S2, no m/r/g  GI: soft, nondistended, mildly tender LUQ without any palpable masses or splenomegaly. No rebound or guarding  Genitourinary: deferred  Skin: no visible rashes,  port in right chest is c/d/i  Musculoskeletal: +2 b/l LE edema, compression stockings in place  Neurologic: A&O x 4, no focal deficits, normal gait, CN II-XII intact  Psychiatric: appropriate affect    Data   Data reviewed today:  I personally reviewed no images or EKG's today.    Recent Labs  Lab 01/18/18  0857 01/18/18  0845 01/15/18  0916 01/12/18  0857   WBC  --  4.9 6.5 5.4   HGB  --  9.8* 9.8* 9.9*   MCV  --  104* 104* 100   PLT  --  259 213 141*     --   --   --    POTASSIUM 3.7  --   --   --    CHLORIDE 107  --   --   --    CO2 27  --   --   --    BUN 15  --   --   --    CR 0.80  --   --   --    ANIONGAP 8  --   --   --    ELMO 8.0*  --   --   --    *  --   --   --    ALBUMIN 2.6*  --   --   --    PROTTOTAL 5.5*  --   --   --    BILITOTAL 1.0  --   --   --    ALKPHOS 67  --   --   --    ALT 18  --   --   --    AST 18  --   --   --        No results found for this or any previous visit (from the past 24 hour(s)).

## 2018-01-18 NOTE — PROGRESS NOTES
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 She is currently undergoing treatment with DA-R-EPOCH. Her past medical history is significant for breast cancer in 2011 s/p bilateral mastectomies and BENJIE/BSO up until recently on Tamoxifen, papillary thyroid cancer s/p total thyroidectomy, chronic chest wall pain, and asthma. Briefly, her oncologic history is as follows:    She presented in 8/2017 with dramatically worse chest and back pain. CT 9/1/17 showed lytic lesion right 10th rib extending to right T9-10 neural foramen with vertebral body invasion. There was associated conglomerate of lymphadenopathy around the mid T-spine. She had a CT guided biopsy showing B-cell high grade lymphoma. Pathology showed \"The morphology shows a population of large cells, diffuse lymphoid infiltrate. The cells are atypical with abundant mitotic and apoptotic activity and single cell necrosis. Tumor is CD45 and CD79a positive and focally weakly CD30 positive. The other stains revealed positivity for CD20, BCL6, BCL2 and MUM1, and CD10 and CD21 negative. The CD5 and CD3 highlighted background T-cells.  MYC expression was equivocal with approximately 40% nuclei staining.  Ki-67 proliferative index is greater than 90-95%. The immunohistochemistry is suggestive of non-GCB immunophenotype of diffuse large B-cell lymphoma. The cytogenetics did not show rearrangement of the BCL2 or c-MYC. There is the presence of BCL6 rearrangement in 78% of cells and gains of MYC and BCL2, but no amplifications.\"     Staging LP and BMBx were negative for lymphoma. She started DA-REPOCH 10/6/17. She did well with chemo other than rigors during CIVI. D/c 10/11/17. Given Neulasta 10/12/17. Post cycle 1 complicated by mucositis and worsening of chronic LE peripheral edema. She began cycle 2 on " 10/27/17. Due to a rise in her LD and uric acid levels on that admission day, she underwent a CT scan which showed response to therapy with improvement in her mediastinal lymphadenopathy and right chest wall mass. She had a CR following 4 cycles on PET/CT 12/21.     Interval History:  Elvin is here for follow-up prior to scheduled admission for cycle 6 today. She is feeling well apart from still having fatigue. She feels the PRBCs made a minimal difference last week. She is a little less dizzy. I asked her to hold diuretics last week given her low BPs . She has gained 7 pounds and lower leg edema is worse. No worsening SOB, CP. She has her typical chronic postmastectomy pain that is unchanged. She denies any fevers/chills, cough, orthopnea. Bowels are fairly regular with taking fiber con. No diarrhea, nausea, or vomiting. She had some cracking beneath nostrils from rhinitis and dry lips. No mucositis. Urinating well. ROS otherwise negative.     Current Outpatient Prescriptions   Medication Sig Dispense Refill     magic mouthwash suspension (diphenhydrAMINE, lidocaine, aluminum-magnesium & simethicone) Swish and spit 10 mLs in mouth every 6 hours as needed for mouth sores 360 mL 1     potassium chloride SA (KLOR-CON) 20 MEQ CR tablet Take 1 tablet (20 mEq) by mouth daily 90 tablet 3     lidocaine-prilocaine (EMLA) cream Apply topically as needed (port access pain.) 30 g 1     ondansetron (ZOFRAN-ODT) 8 MG ODT tab Take 1 tablet (8 mg) by mouth every 8 hours as needed 30 tablet 1     dexamethasone (DECADRON) 4 MG tablet Take 2 tablets (8 mg) by mouth daily Please take on 1/2/18 and 1/3/18. 4 tablet 0     diazepam (VALIUM) 5 MG tablet Take 1 tablet (5 mg) by mouth 3 times daily as needed For muscle spasms 90 tablet 2     morphine (MS CONTIN) 30 MG 12 hr tablet Take 1 tablet (30 mg) by mouth every 8 hours . Take an addition pill at night such that your evening dose is 60mg 120 tablet 0     oxyCODONE IR (ROXICODONE) 30 MG  tablet Take 1 tablet (30 mg) by mouth every 4 hours as needed for moderate to severe pain 180 tablet 0     prochlorperazine (COMPAZINE) 10 MG tablet Take 10 mg by mouth as needed  3     SUMAtriptan (IMITREX) 50 MG tablet Take 50 mg by mouth as needed  3     cromolyn sodium (NASALCROM) 5.2 MG/ACT AERS Inhaler SPRAY ONE SPRAY( 1 ML) IN NOSTRIL DAILY 1 Bottle 6     acetaminophen (TYLENOL) 325 MG tablet Take 2 tablets (650 mg) by mouth every 4 hours as needed for mild pain or fever 100 tablet      fluconazole (DIFLUCAN) 200 MG tablet Take 1 tablet (200 mg) by mouth daily 30 tablet 0     cetirizine (ZYRTEC ALLERGY) 10 MG tablet Take 1 tablet (10 mg) by mouth 3 times daily On hold for lab test. 30 tablet 0     acyclovir (ZOVIRAX) 400 MG tablet Take 1 tablet (400 mg) by mouth 2 times daily 60 tablet 0     diclofenac (VOLTAREN) 1 % GEL topical gel Apply affected area two times daily PRN using enclosed dosing card. 100 g 0     levofloxacin (LEVAQUIN) 250 MG tablet Take 1 tablet (250 mg) by mouth daily 30 tablet 0     allopurinol (ZYLOPRIM) 300 MG tablet Take 1 tablet (300 mg) by mouth daily 30 tablet 0     ferrous sulfate (IRON) 325 (65 FE) MG tablet Take 1 tablet (325 mg) by mouth 3 times daily (with meals) 90 tablet 0     cyclobenzaprine (FLEXERIL) 10 MG tablet Take 1 tablet (10 mg) by mouth 3 times daily as needed 30 tablet 0     levothyroxine (SYNTHROID/LEVOTHROID) 112 MCG tablet Take 3 tablets (336 mcg) by mouth once a week 30 tablet      [employer] FIBER GUMMY BEARS CHILDREN CHEW Take 5 g by mouth daily (2 gummy= 5 g =1 serving)       triamcinolone (KENALOG) 0.025 % ointment Apply topically as needed  3     lidocaine (XYLOCAINE) 5 % ointment Apply quarter size amount to chest and back up to 3 times daily as needed for pain. 350 g 1     montelukast (SINGULAIR) 10 MG tablet Take 1 tablet (10 mg) by mouth 2 times daily (Patient taking differently: Take 20 mg by mouth 2 times daily ) 60 tablet 0     furosemide (LASIX) 20 MG  tablet Take 1 tablet (20 mg) by mouth 2 times daily 60 tablet 0     bisacodyl (DULCOLAX) 5 MG EC tablet Take 3 tablets (15 mg) by mouth daily as needed for constipation 30 tablet 0     polyethylene glycol (MIRALAX) powder Take 17 g (1 capful) by mouth daily 510 g 0     senna-docusate (SENOKOT-S;PERICOLACE) 8.6-50 MG per tablet Take 1-2 tablets by mouth 2 times daily as needed for constipation 60 tablet 0     UNABLE TO FIND Take 2 capsules by mouth 3 times daily Muscle Mag. 2 caps contain B1 20mg, B2 20mg, B6 10mg, magesium 20mg, manganese 2mg.       calcitRIOL (ROCALTROL) 0.25 MCG capsule TAKE 2 CAPSULES BY MOUTH EVERY MORNING AND TAKE 1 CAPSULE BY MOUTH EVERY NIGHT AT BEDTIME (Patient taking differently: TAKE 2 CAPSULES BY MOUTH EVERY MORNING AND TAKE 1 CAPSULE BY MOUTH at noon) 270 capsule 3     celecoxib (CELEBREX) 200 MG capsule TAKE ONE CAPSULE BY MOUTH TWICE DAILY  56 capsule 0     order for DME Compression stockings, medium grade, please measure and fit. Please dispense a pair. 1 Units 0     methocarbamol (ROBAXIN) 750 MG tablet Take 1 tablet (750 mg) by mouth 4 times daily as needed . Ok to take a 5th Robaxin on very severe days. (Patient taking differently: Take 750 mg by mouth 4 times daily . Ok to take a 5th Robaxin on very severe days.) 360 tablet 1     COMPOUND CONTAINING CONTROLLED SUBSTANCE (CMPD RX) - PHARMACY TO MIX COMPOUNDED MEDICATION Apply small amount to affected area two times daily. 120 g 6     levothyroxine (SYNTHROID/LEVOTHROID) 112 MCG tablet Mon-Sat: (2 tabs daily) Sunday: 3 tabs (Patient taking differently: 112 mcg Mon-Sat: (2 tabs daily) Sunday: 3 tabs) 189 tablet 3     hydrochlorothiazide (MICROZIDE) 12.5 MG capsule Take 1 capsule (12.5 mg) by mouth daily 90 capsule 2     EPINEPHrine (EPIPEN 2-CARIDAD) 0.3 MG/0.3ML injection Inject 0.3 mLs (0.3 mg) into the muscle once as needed for anaphylaxis 0.6 mL 3     VENTOLIN  (90 BASE) MCG/ACT Inhaler INHALE 2 PUFFS INTO THE LUNGS EVERY 4  HOURS AS NEEDED FOR SHORTNESS OF BREATH OR DIFFICULT BREATHING OR WHEEZING 18 g 3     cromolyn (OPTICROM) 4 % ophthalmic solution Place 1 drop into both eyes 4 times daily 10 mL 5     Cholecalciferol (VITAMIN D3) 2000 UNITS CAPS Take 5,000 Units by mouth daily Takes 2 tabs daily 60 capsule 4     order for DME Equipment being ordered: compression stockings. 20-30 mm or 30 - 40 mm as patient can tolerate. Physical therapy to determine if they should be above or below the knee. 2 Units 4     order for DME Equipment being ordered: compression bra 2 Device 1     ranitidine (ZANTAC) 75 MG tablet Take 1 tablet (75 mg) by mouth 3 times daily 270 tablet 3     aluminum chloride (DRYSOL) 20 % external solution Apply topically At Bedtime 60 mL 3     calcium citrate-vitamin D (CALCIUM CITRATE +D) 315-250 MG-UNIT TABS Take 2 tablets by mouth twice a week  120 tablet      calcium carbonate (TUMS) 500 MG chewable tablet Take 2 tablets (1,000 mg) by mouth nightly as needed for other (cramps) 180 chew tab 3     UNABLE TO FIND 3 tablets 3 times daily MEDICATION NAME: calcium D-Glucarate   3 caps contain 180mg of elemental calcium.       Triamcinolone Acetonide (AZMACORT IN) Inhale 2 puffs into the lungs as needed       UNABLE TO FIND 2 tablets 3 times daily MEDICATION NAME: Digestzymes        UNABLE TO FIND 1 tablet daily MEDICATION NAME: Pure Encapsulations       Probiotic Product (PROBIOTIC DAILY PO) Take 1 capsule by mouth daily Lacto acid bifidobacterium       Physical Examination:  /70  Pulse 124  Temp 98.1  F (36.7  C)  Resp 16  Wt 88.2 kg (194 lb 8 oz)  SpO2 97%  BMI 32.37 kg/m2  Wt Readings from Last 10 Encounters:   01/18/18 88.2 kg (194 lb 8 oz)   01/15/18 86.1 kg (189 lb 14.4 oz)   01/09/18 82.3 kg (181 lb 8 oz)   01/03/18 85.7 kg (189 lb)   01/01/18 87.5 kg (193 lb)   12/26/17 85.5 kg (188 lb 7.9 oz)   12/19/17 83.8 kg (184 lb 11.2 oz)   12/15/17 84.9 kg (187 lb 3.2 oz)   12/13/17 88.2 kg (194 lb 6.4 oz)  "  12/11/17 88.4 kg (194 lb 14.4 oz)   Constitutional: Well-appearing female in no acute distress. Appears tired.  Eyes: EOMI, PERRL. No scleral icterus.  ENT: Oral mucosa is moist without lesions or thrush. She has dry lips and cracking beneath nostrils   Lymphatic: Neck is supple without cervical or supraclavicular lymphadenopathy.   Cardiovascular: Regular rate and rhythm   Respiratory: Clear to auscultation bilaterally. No wheezes or crackles.  Gastrointestinal: Bowel sounds present. Abdomen soft, nontender.  No masses palpated.   Neurologic: Cranial nerves II through XII are intact.   Skin: No rashes, bruising, or petechiae on exposed skin   Extremities: 2+ lower extremity edema bilaterally--worse today    Laboratory Data:   1/18/2018 08:45 1/18/2018 08:57   Sodium  142   Potassium  3.7   Chloride  107   Carbon Dioxide  27   Urea Nitrogen  15   Creatinine  0.80   GFR Estimate  73   GFR Estimate If Black  89   Calcium  8.0 (L)   Anion Gap  8   Albumin  2.6 (L)   Protein Total  5.5 (L)   Bilirubin Total  1.0   Alkaline Phosphatase  67   ALT  18   AST  18   N-Terminal Pro Bnp  79   Glucose  131 (H)   WBC 4.9    Hemoglobin 9.8 (L)    Hematocrit 30.7 (L)    Platelet Count 259    RBC Count 2.95 (L)     (H)    MCH 33.2 (H)    MCHC 31.9    RDW 18.6 (H)    Diff Method Automated Method    % Neutrophils 76.3    % Lymphocytes 8.9    % Monocytes 11.6    % Eosinophils 0.4    % Basophils 1.0    % Immature Granulocytes 1.8    Nucleated RBCs 0    Absolute Neutrophil 3.8    Absolute Lymphocytes 0.4 (L)    Absolute Monocytes 0.6    Absolute Eosinophils 0.0    Absolute Basophils 0.1    Abs Immature Granulocytes 0.1    Absolute Nucleated RBC 0.0        Assessment and Plan:    1. DLBCL, \"double-hit\"-like, currently on treatment with DA-R-EPOCH  Now s/p 5 cycles with CR on PET/CT after 4. She is due for cycle 6 today and is feeling well apart from fatigue to continue. She does meet toro parameters for dose escalation, so I " will increase the dose of etoposide, doxorubicin, and cyclophosphamide by 20%. Dosing was checked and confirmed by infusion pharmacy. She will need Neulasta appt set up upon d/c. I will attempt to coordinate hospital follow-up and transfusion needs.     2. Edema: Peripheral edema worse. She has been holding diuretics since last week. Checked BNP given this and weight gain and this was normal. Had an echo for similar symptoms last month and was unchanged. Given completely normal BNP, I do not feel close repeat echo is needed. Inpatient team please monitor edema and BP and resume diuretics (lasix and HCTZ).     3. Heme: Counts recovered. Hgb 9.8, ANC 3.8, platelets 259. Continue twice weekly checks upon d/c and transfuse if Hgb<8 and Plt<10k.     4. ID: No active concerns.   Continue ppx ACV 400mg BID. Hold Levaquin 250mg daily and fluconazole 200mg daily during admission but resume upon d/c.     5. History of stage 1, ER/IA+, HER2- breast cancer s/p bilateral mastectomies and BENJIE/BSO  Follows with Dr. Crawley. Oncotype recurrence score 11%. Was initially on Arimidex (1760-6280) but changed to Tamoxifen due to arthralgias. Tamoxifen held since 10/5 and continue to hold with chemotherapy. Last f/u Dr. Crawley was on 11/27/17.    6. History of papillary thyroid cancer, s/p total thyroidectomy  Follows with Dr. Castro. Has post surgical hypothyroidism. Continue levothyroxine and calcitriol as prescribed. Neck u/s on 11/2 shows no evidence of disease.    7. History of Rachael en Y gastric bypass  Continue supplements (calcium citrate-vitamin D, vitamin D3, magnesium citrate). Also has iron deficiency anemia likely from poor absorption and per Dr. Sauceda, she should continue 325mg PO ferrous sulfate TID.     8. Chronic chest wall pain s/p mastectomies  Follows with Dr. Benitez. Palliative care to continue to prescribe pain medications. Taking MS Contin 30/30/60 mg tid and oxycodone prn, currently 15 mg qid for  breakthrough pain, and celebrex.     9. Constipation.  Under control with Colace, fiber tabs, and MiraLax when needed, which she will continue.       Rashida Jaffe PA-C    [facility] [address] [id number] [phone]

## 2018-01-18 NOTE — NURSING NOTE
"Oncology Rooming Note    January 18, 2018 9:00 AM   Tisha Arias is a 57 year old female who presents for:    Chief Complaint   Patient presents with     Port Draw     accessed with power needle, heparin locked, vitals checked     Oncology Clinic Visit     Return: Lymphoma for Hosp. Admit.     Initial Vitals: /70  Pulse 124  Temp 98.1  F (36.7  C)  Resp 16  Wt 88.2 kg (194 lb 8 oz)  SpO2 97%  BMI 32.37 kg/m2 Estimated body mass index is 32.37 kg/(m^2) as calculated from the following:    Height as of 1/9/18: 1.651 m (5' 5\").    Weight as of this encounter: 88.2 kg (194 lb 8 oz). Body surface area is 2.01 meters squared.  Extreme Pain (8) Comment: Data Unavailable   No LMP recorded. Patient has had a hysterectomy.  Allergies reviewed: No patient did not want to go through them   Medications reviewed: No patient did not want to go through them    Medications: Medication refills not needed today.  Pharmacy name entered into happn: [id number] - [place], [place] - [address] [address]. [place]    Clinical concerns: no new concerns, with the exception of the swelling in her legs Rashida Jaffe was NOT notified.    10 minutes for nursing intake (face to face time)     Lashonda Talbert CMA              "

## 2018-01-18 NOTE — LETTER
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 She is currently undergoing treatment with DA-R-EPOCH. Her past medical history is significant for breast cancer in 2011 s/p bilateral mastectomies and BENJIE/BSO up until recently on Tamoxifen, papillary thyroid cancer s/p total thyroidectomy, chronic chest wall pain, and asthma. Briefly, her oncologic history is as follows:    She presented in 8/2017 with dramatically worse chest and back pain. CT 9/1/17 showed lytic lesion right 10th rib extending to right T9-10 neural foramen with vertebral body invasion. There was associated conglomerate of lymphadenopathy around the mid T-spine. She had a CT guided biopsy showing B-cell high grade lymphoma. Pathology showed \"The morphology shows a population of large cells, diffuse lymphoid infiltrate. The cells are atypical with abundant mitotic and apoptotic activity and single cell necrosis. Tumor is CD45 and CD79a positive and focally weakly CD30 positive. The other stains revealed positivity for CD20, BCL6, BCL2 and MUM1, and CD10 and CD21 negative. The CD5 and CD3 highlighted background T-cells.  MYC expression was equivocal with approximately 40% nuclei staining.  Ki-67 proliferative index is greater than 90-95%. The immunohistochemistry is suggestive of non-GCB immunophenotype of diffuse large B-cell lymphoma. The cytogenetics did not show rearrangement of the BCL2 or c-MYC. There is the presence of BCL6 rearrangement in 78% of cells and gains of MYC and BCL2, but no amplifications.\"     Staging LP and BMBx were negative for lymphoma. She started DA-REPOCH 10/6/17. She did well with chemo other than rigors during CIVI. D/c 10/11/17. Given Neulasta 10/12/17. Post cycle 1 complicated " by mucositis and worsening of chronic LE peripheral edema. She began cycle 2 on 10/27/17. Due to a rise in her LD and uric acid levels on that admission day, she underwent a CT scan which showed response to therapy with improvement in her mediastinal lymphadenopathy and right chest wall mass. She had a CR following 4 cycles on PET/CT 12/21.     Interval History:  Elvin is here for follow-up prior to scheduled admission for cycle 6 today. She is feeling well apart from still having fatigue. She feels the PRBCs made a minimal difference last week. She is a little less dizzy. I asked her to hold diuretics last week given her low BPs . She has gained 7 pounds and lower leg edema is worse. No worsening SOB, CP. She has her typical chronic postmastectomy pain that is unchanged. She denies any fevers/chills, cough, orthopnea. Bowels are fairly regular with taking fiber con. No diarrhea, nausea, or vomiting. She had some cracking beneath nostrils from rhinitis and dry lips. No mucositis. Urinating well. ROS otherwise negative.     Current Outpatient Prescriptions   Medication Sig Dispense Refill     magic mouthwash suspension (diphenhydrAMINE, lidocaine, aluminum-magnesium & simethicone) Swish and spit 10 mLs in mouth every 6 hours as needed for mouth sores 360 mL 1     potassium chloride SA (KLOR-CON) 20 MEQ CR tablet Take 1 tablet (20 mEq) by mouth daily 90 tablet 3     lidocaine-prilocaine (EMLA) cream Apply topically as needed (port access pain.) 30 g 1     ondansetron (ZOFRAN-ODT) 8 MG ODT tab Take 1 tablet (8 mg) by mouth every 8 hours as needed 30 tablet 1     dexamethasone (DECADRON) 4 MG tablet Take 2 tablets (8 mg) by mouth daily Please take on 1/2/18 and 1/3/18. 4 tablet 0     diazepam (VALIUM) 5 MG tablet Take 1 tablet (5 mg) by mouth 3 times daily as needed For muscle spasms 90 tablet 2     morphine (MS CONTIN) 30 MG 12 hr tablet Take 1 tablet (30 mg) by mouth every 8 hours . Take an addition pill at night such  that your evening dose is 60mg 120 tablet 0     oxyCODONE IR (ROXICODONE) 30 MG tablet Take 1 tablet (30 mg) by mouth every 4 hours as needed for moderate to severe pain 180 tablet 0     prochlorperazine (COMPAZINE) 10 MG tablet Take 10 mg by mouth as needed  3     SUMAtriptan (IMITREX) 50 MG tablet Take 50 mg by mouth as needed  3     cromolyn sodium (NASALCROM) 5.2 MG/ACT AERS Inhaler SPRAY ONE SPRAY( 1 ML) IN NOSTRIL DAILY 1 Bottle 6     acetaminophen (TYLENOL) 325 MG tablet Take 2 tablets (650 mg) by mouth every 4 hours as needed for mild pain or fever 100 tablet      fluconazole (DIFLUCAN) 200 MG tablet Take 1 tablet (200 mg) by mouth daily 30 tablet 0     cetirizine (ZYRTEC ALLERGY) 10 MG tablet Take 1 tablet (10 mg) by mouth 3 times daily On hold for lab test. 30 tablet 0     acyclovir (ZOVIRAX) 400 MG tablet Take 1 tablet (400 mg) by mouth 2 times daily 60 tablet 0     diclofenac (VOLTAREN) 1 % GEL topical gel Apply affected area two times daily PRN using enclosed dosing card. 100 g 0     levofloxacin (LEVAQUIN) 250 MG tablet Take 1 tablet (250 mg) by mouth daily 30 tablet 0     allopurinol (ZYLOPRIM) 300 MG tablet Take 1 tablet (300 mg) by mouth daily 30 tablet 0     ferrous sulfate (IRON) 325 (65 FE) MG tablet Take 1 tablet (325 mg) by mouth 3 times daily (with meals) 90 tablet 0     cyclobenzaprine (FLEXERIL) 10 MG tablet Take 1 tablet (10 mg) by mouth 3 times daily as needed 30 tablet 0     levothyroxine (SYNTHROID/LEVOTHROID) 112 MCG tablet Take 3 tablets (336 mcg) by mouth once a week 30 tablet      [employer] FIBER GUMMY BEARS CHILDREN CHEW Take 5 g by mouth daily (2 gummy= 5 g =1 serving)       triamcinolone (KENALOG) 0.025 % ointment Apply topically as needed  3     lidocaine (XYLOCAINE) 5 % ointment Apply quarter size amount to chest and back up to 3 times daily as needed for pain. 350 g 1     montelukast (SINGULAIR) 10 MG tablet Take 1 tablet (10 mg) by mouth 2 times daily (Patient taking differently:  Take 20 mg by mouth 2 times daily ) 60 tablet 0     furosemide (LASIX) 20 MG tablet Take 1 tablet (20 mg) by mouth 2 times daily 60 tablet 0     bisacodyl (DULCOLAX) 5 MG EC tablet Take 3 tablets (15 mg) by mouth daily as needed for constipation 30 tablet 0     polyethylene glycol (MIRALAX) powder Take 17 g (1 capful) by mouth daily 510 g 0     senna-docusate (SENOKOT-S;PERICOLACE) 8.6-50 MG per tablet Take 1-2 tablets by mouth 2 times daily as needed for constipation 60 tablet 0     UNABLE TO FIND Take 2 capsules by mouth 3 times daily Muscle Mag. 2 caps contain B1 20mg, B2 20mg, B6 10mg, magesium 20mg, manganese 2mg.       calcitRIOL (ROCALTROL) 0.25 MCG capsule TAKE 2 CAPSULES BY MOUTH EVERY MORNING AND TAKE 1 CAPSULE BY MOUTH EVERY NIGHT AT BEDTIME (Patient taking differently: TAKE 2 CAPSULES BY MOUTH EVERY MORNING AND TAKE 1 CAPSULE BY MOUTH at noon) 270 capsule 3     celecoxib (CELEBREX) 200 MG capsule TAKE ONE CAPSULE BY MOUTH TWICE DAILY  56 capsule 0     order for DME Compression stockings, medium grade, please measure and fit. Please dispense a pair. 1 Units 0     methocarbamol (ROBAXIN) 750 MG tablet Take 1 tablet (750 mg) by mouth 4 times daily as needed . Ok to take a 5th Robaxin on very severe days. (Patient taking differently: Take 750 mg by mouth 4 times daily . Ok to take a 5th Robaxin on very severe days.) 360 tablet 1     COMPOUND CONTAINING CONTROLLED SUBSTANCE (CMPD RX) - PHARMACY TO MIX COMPOUNDED MEDICATION Apply small amount to affected area two times daily. 120 g 6     levothyroxine (SYNTHROID/LEVOTHROID) 112 MCG tablet Mon-Sat: (2 tabs daily) Sunday: 3 tabs (Patient taking differently: 112 mcg Mon-Sat: (2 tabs daily) Sunday: 3 tabs) 189 tablet 3     hydrochlorothiazide (MICROZIDE) 12.5 MG capsule Take 1 capsule (12.5 mg) by mouth daily 90 capsule 2     EPINEPHrine (EPIPEN 2-CARIDAD) 0.3 MG/0.3ML injection Inject 0.3 mLs (0.3 mg) into the muscle once as needed for anaphylaxis 0.6 mL 3      VENTOLIN  (90 BASE) MCG/ACT Inhaler INHALE 2 PUFFS INTO THE LUNGS EVERY 4 HOURS AS NEEDED FOR SHORTNESS OF BREATH OR DIFFICULT BREATHING OR WHEEZING 18 g 3     cromolyn (OPTICROM) 4 % ophthalmic solution Place 1 drop into both eyes 4 times daily 10 mL 5     Cholecalciferol (VITAMIN D3) 2000 UNITS CAPS Take 5,000 Units by mouth daily Takes 2 tabs daily 60 capsule 4     order for DME Equipment being ordered: compression stockings. 20-30 mm or 30 - 40 mm as patient can tolerate. Physical therapy to determine if they should be above or below the knee. 2 Units 4     order for DME Equipment being ordered: compression bra 2 Device 1     ranitidine (ZANTAC) 75 MG tablet Take 1 tablet (75 mg) by mouth 3 times daily 270 tablet 3     aluminum chloride (DRYSOL) 20 % external solution Apply topically At Bedtime 60 mL 3     calcium citrate-vitamin D (CALCIUM CITRATE +D) 315-250 MG-UNIT TABS Take 2 tablets by mouth twice a week  120 tablet      calcium carbonate (TUMS) 500 MG chewable tablet Take 2 tablets (1,000 mg) by mouth nightly as needed for other (cramps) 180 chew tab 3     UNABLE TO FIND 3 tablets 3 times daily MEDICATION NAME: calcium D-Glucarate   3 caps contain 180mg of elemental calcium.       Triamcinolone Acetonide (AZMACORT IN) Inhale 2 puffs into the lungs as needed       UNABLE TO FIND 2 tablets 3 times daily MEDICATION NAME: Digestzymes        UNABLE TO FIND 1 tablet daily MEDICATION NAME: Pure Encapsulations       Probiotic Product (PROBIOTIC DAILY PO) Take 1 capsule by mouth daily Lacto acid bifidobacterium       Physical Examination:  /70  Pulse 124  Temp 98.1  F (36.7  C)  Resp 16  Wt 88.2 kg (194 lb 8 oz)  SpO2 97%  BMI 32.37 kg/m2  Wt Readings from Last 10 Encounters:   01/18/18 88.2 kg (194 lb 8 oz)   01/15/18 86.1 kg (189 lb 14.4 oz)   01/09/18 82.3 kg (181 lb 8 oz)   01/03/18 85.7 kg (189 lb)   01/01/18 87.5 kg (193 lb)   12/26/17 85.5 kg (188 lb 7.9 oz)   12/19/17 83.8 kg (184 lb 11.2  "oz)   12/15/17 84.9 kg (187 lb 3.2 oz)   12/13/17 88.2 kg (194 lb 6.4 oz)   12/11/17 88.4 kg (194 lb 14.4 oz)   Constitutional: Well-appearing female in no acute distress. Appears tired.  Eyes: EOMI, PERRL. No scleral icterus.  ENT: Oral mucosa is moist without lesions or thrush. She has dry lips and cracking beneath nostrils   Lymphatic: Neck is supple without cervical or supraclavicular lymphadenopathy.   Cardiovascular: Regular rate and rhythm   Respiratory: Clear to auscultation bilaterally. No wheezes or crackles.  Gastrointestinal: Bowel sounds present. Abdomen soft, nontender.  No masses palpated.   Neurologic: Cranial nerves II through XII are intact.   Skin: No rashes, bruising, or petechiae on exposed skin   Extremities: 2+ lower extremity edema bilaterally--worse today    Laboratory Data:   1/18/2018 08:45 1/18/2018 08:57   Sodium  142   Potassium  3.7   Chloride  107   Carbon Dioxide  27   Urea Nitrogen  15   Creatinine  0.80   GFR Estimate  73   GFR Estimate If Black  89   Calcium  8.0 (L)   Anion Gap  8   Albumin  2.6 (L)   Protein Total  5.5 (L)   Bilirubin Total  1.0   Alkaline Phosphatase  67   ALT  18   AST  18   N-Terminal Pro Bnp  79   Glucose  131 (H)   WBC 4.9    Hemoglobin 9.8 (L)    Hematocrit 30.7 (L)    Platelet Count 259    RBC Count 2.95 (L)     (H)    MCH 33.2 (H)    MCHC 31.9    RDW 18.6 (H)    Diff Method Automated Method    % Neutrophils 76.3    % Lymphocytes 8.9    % Monocytes 11.6    % Eosinophils 0.4    % Basophils 1.0    % Immature Granulocytes 1.8    Nucleated RBCs 0    Absolute Neutrophil 3.8    Absolute Lymphocytes 0.4 (L)    Absolute Monocytes 0.6    Absolute Eosinophils 0.0    Absolute Basophils 0.1    Abs Immature Granulocytes 0.1    Absolute Nucleated RBC 0.0        Assessment and Plan:    1. DLBCL, \"double-hit\"-like, currently on treatment with DA-R-EPOCH  Now s/p 5 cycles with CR on PET/CT after 4. She is due for cycle 6 today and is feeling well apart from fatigue " to continue. She does meet toro parameters for dose escalation, so I will increase the dose of etoposide, doxorubicin, and cyclophosphamide by 20%. Dosing was checked and confirmed by infusion pharmacy. She will need Neulasta appt set up upon d/c. I will attempt to coordinate hospital follow-up and transfusion needs.     2. Edema: Peripheral edema worse. She has been holding diuretics since last week. Checked BNP given this and weight gain and this was normal. Had an echo for similar symptoms last month and was unchanged. Given completely normal BNP, I do not feel close repeat echo is needed. Inpatient team please monitor edema and BP and resume diuretics (lasix and HCTZ).     3. Heme: Counts recovered. Hgb 9.8, ANC 3.8, platelets 259. Continue twice weekly checks upon d/c and transfuse if Hgb<8 and Plt<10k.     4. ID: No active concerns.   Continue ppx ACV 400mg BID. Hold Levaquin 250mg daily and fluconazole 200mg daily during admission but resume upon d/c.     5. History of stage 1, ER/KY+, HER2- breast cancer s/p bilateral mastectomies and BENJIE/BSO  Follows with Dr. Crawley. Oncotype recurrence score 11%. Was initially on Arimidex (6532-7635) but changed to Tamoxifen due to arthralgias. Tamoxifen held since 10/5 and continue to hold with chemotherapy. Last f/u Dr. Crawley was on 11/27/17.    6. History of papillary thyroid cancer, s/p total thyroidectomy  Follows with Dr. Castro. Has post surgical hypothyroidism. Continue levothyroxine and calcitriol as prescribed. Neck u/s on 11/2 shows no evidence of disease.    7. History of Rachael en Y gastric bypass  Continue supplements (calcium citrate-vitamin D, vitamin D3, magnesium citrate). Also has iron deficiency anemia likely from poor absorption and per Dr. Sauceda, she should continue 325mg PO ferrous sulfate TID.     8. Chronic chest wall pain s/p mastectomies  Follows with Dr. Benitez. Palliative care to continue to prescribe pain medications. Taking MS Contin  30/30/60 mg tid and oxycodone prn, currently 15 mg qid for breakthrough pain, and celebrex.     9. Constipation.  Under control with Colace, fiber tabs, and MiraLax when needed, which she will continue.       Rashida Jaffe PA-C    [facility]  [address] [place] [id number]  [phone]

## 2018-01-18 NOTE — IP AVS SNAPSHOT
[address] [address]    Phone:  [phone]                                       After Visit Summary   1/18/2018    Tisha Arias    MRN: [id number]           After Visit Summary Signature Page     I have received my discharge instructions, and my questions have been answered. I have discussed any challenges I see with this plan with the nurse or doctor.    ..........................................................................................................................................  Patient/Patient Representative Signature      ..........................................................................................................................................  Patient Representative Print Name and Relationship to Patient    ..................................................               ................................................  Date                                            Time    ..........................................................................................................................................  Reviewed by Signature/Title    ...................................................              ..............................................  Date                                                            Time

## 2018-01-18 NOTE — LETTER
Transition Communication Hand-off for Care Transitions to Next Level of Care Provider    Name: Tisha Arias  MRN #: [id number]  Primary Care Provider: Shahla Crawley  Primary Clinic: [address] [facility] [address]  [facility] [address]    Hematologist: Dr. Sauceda/[facility]     Reason for Hospitalization:  LYMPHOMA  Diffuse large B cell lymphoma (H)  Admit Date/Time: 1/18/2018  4:22 PM  Discharge Date: 1/22/2018  Payor Source: Payor: BCBS / Plan: [place]BS [place] / Product Type: Indemnity /     Tisha Arias is a 57 year old female with high grade DLBCL and PMHx significant for breast cancer s/p bilateral mastectomies & BENJIE/BSO, papillary thyroid cancer s/p total thyroidectomy, chronic chest wall pain, muscle spasms, asthma, MCAS and h/o Rachael en Y gastric bypass, who is admitted for Cycle 6 of DA-R-EPOCH.    Discharge Plan:  Home with clinic follow up     Date Time Provider Department Center   1/24/2018 9:45 AM  MASONIC LAB DRAW [facility]   1/24/2018 10:30 AM Selina Elizondo PA [facility]   1/29/2018 10:15 AM  MASONIC LAB DRAW [facility]   1/29/2018 11:00 AM  ONCOLOGY INFUSION [facility]   2/1/2018 8:30 AM  MASONIC LAB DRAW [facility]   2/5/2018 8:30 AM  MASONIC LAB DRAW [facility]   2/8/2018 8:30 AM  MASONIC LAB DRAW [facility]   2/27/2018 6:45 AM UUPET1 [facility]   2/28/2018 9:30 AM Edu Benitez MD [facility]   2/28/2018 10:15 AM Garima Sauceda MD [facility]   5/21/2018 4:20 PM Avelina Castro MD [facility]   6/11/2018 4:00 PM Shahla Crawley MD [facility]       Tejal Schrader, RNCC  Phone [phone]  Pager [phone]    AVS/Discharge Summary is the source of truth; this is a helpful guide for improved communication of patient story

## 2018-01-18 NOTE — LETTER
[address] [facility]  [address] [facility] [address]  [phone]          January 22, 2018    RE:  Tisha Arias                                                                                                                                                       [address] [place] [address]        To whom it may concern:    Tisha Arias has been under my professional care at the [facility].  She is cognitively intact and would be cognitively able to return to work as of 1/29/18.          Sincerely,          Destiny Mtz, MACK, APRN CNP   [facility]   [address] [facility] [place] [address]

## 2018-01-19 ENCOUNTER — APPOINTMENT (OUTPATIENT)
Dept: GENERAL RADIOLOGY | Facility: CLINIC | Age: 58
End: 2018-01-19
Attending: PHYSICIAN ASSISTANT
Payer: COMMERCIAL

## 2018-01-19 LAB
ANION GAP SERPL CALCULATED.3IONS-SCNC: 9 MMOL/L (ref 3–14)
BASOPHILS # BLD AUTO: 0 10E9/L (ref 0–0.2)
BASOPHILS NFR BLD AUTO: 0.3 %
BUN SERPL-MCNC: 16 MG/DL (ref 7–30)
CALCIUM SERPL-MCNC: 8.6 MG/DL (ref 8.5–10.1)
CHLORIDE SERPL-SCNC: 106 MMOL/L (ref 94–109)
CO2 SERPL-SCNC: 27 MMOL/L (ref 20–32)
CREAT SERPL-MCNC: 0.88 MG/DL (ref 0.52–1.04)
DIFFERENTIAL METHOD BLD: ABNORMAL
EOSINOPHIL # BLD AUTO: 0 10E9/L (ref 0–0.7)
EOSINOPHIL NFR BLD AUTO: 0 %
ERYTHROCYTE [DISTWIDTH] IN BLOOD BY AUTOMATED COUNT: 18.3 % (ref 10–15)
GFR SERPL CREATININE-BSD FRML MDRD: 66 ML/MIN/1.7M2
GLUCOSE CSF-MCNC: 70 MG/DL (ref 40–70)
GLUCOSE SERPL-MCNC: 142 MG/DL (ref 70–99)
GRAM STN SPEC: NORMAL
HCT VFR BLD AUTO: 29.5 % (ref 35–47)
HGB BLD-MCNC: 9.2 G/DL (ref 11.7–15.7)
IMM GRANULOCYTES # BLD: 0.1 10E9/L (ref 0–0.4)
IMM GRANULOCYTES NFR BLD: 0.7 %
LYMPHOCYTES # BLD AUTO: 0.3 10E9/L (ref 0.8–5.3)
LYMPHOCYTES NFR BLD AUTO: 3.4 %
MCH RBC QN AUTO: 31.9 PG (ref 26.5–33)
MCHC RBC AUTO-ENTMCNC: 31.2 G/DL (ref 31.5–36.5)
MCV RBC AUTO: 102 FL (ref 78–100)
MONOCYTES # BLD AUTO: 0.2 10E9/L (ref 0–1.3)
MONOCYTES NFR BLD AUTO: 2.1 %
NEUTROPHILS # BLD AUTO: 7.1 10E9/L (ref 1.6–8.3)
NEUTROPHILS NFR BLD AUTO: 93.5 %
NRBC # BLD AUTO: 0 10*3/UL
NRBC BLD AUTO-RTO: 0 /100
PLATELET # BLD AUTO: 265 10E9/L (ref 150–450)
POTASSIUM SERPL-SCNC: 4.1 MMOL/L (ref 3.4–5.3)
PROT CSF-MCNC: 58 MG/DL (ref 15–60)
RBC # BLD AUTO: 2.88 10E12/L (ref 3.8–5.2)
SODIUM SERPL-SCNC: 142 MMOL/L (ref 133–144)
SPECIMEN SOURCE: NORMAL
WBC # BLD AUTO: 7.6 10E9/L (ref 4–11)

## 2018-01-19 PROCEDURE — 89050 BODY FLUID CELL COUNT: CPT | Performed by: PHYSICIAN ASSISTANT

## 2018-01-19 PROCEDURE — 25000125 ZZHC RX 250: Performed by: RADIOLOGY

## 2018-01-19 PROCEDURE — 25000125 ZZHC RX 250: Performed by: PHYSICIAN ASSISTANT

## 2018-01-19 PROCEDURE — 87070 CULTURE OTHR SPECIMN AEROBIC: CPT | Performed by: PHYSICIAN ASSISTANT

## 2018-01-19 PROCEDURE — 36592 COLLECT BLOOD FROM PICC: CPT | Performed by: PHYSICIAN ASSISTANT

## 2018-01-19 PROCEDURE — 25000132 ZZH RX MED GY IP 250 OP 250 PS 637: Performed by: PHYSICIAN ASSISTANT

## 2018-01-19 PROCEDURE — 85025 COMPLETE CBC W/AUTO DIFF WBC: CPT | Performed by: PHYSICIAN ASSISTANT

## 2018-01-19 PROCEDURE — 82945 GLUCOSE OTHER FLUID: CPT | Performed by: PHYSICIAN ASSISTANT

## 2018-01-19 PROCEDURE — 009U3ZX DRAINAGE OF SPINAL CANAL, PERCUTANEOUS APPROACH, DIAGNOSTIC: ICD-10-PCS | Performed by: RADIOLOGY

## 2018-01-19 PROCEDURE — 84157 ASSAY OF PROTEIN OTHER: CPT | Performed by: PHYSICIAN ASSISTANT

## 2018-01-19 PROCEDURE — 3E0R305 INTRODUCTION OF OTHER ANTINEOPLASTIC INTO SPINAL CANAL, PERCUTANEOUS APPROACH: ICD-10-PCS | Performed by: RADIOLOGY

## 2018-01-19 PROCEDURE — 88184 FLOWCYTOMETRY/ TC 1 MARKER: CPT | Performed by: PHYSICIAN ASSISTANT

## 2018-01-19 PROCEDURE — 12000008 ZZH R&B INTERMEDIATE UMMC

## 2018-01-19 PROCEDURE — 25000128 H RX IP 250 OP 636: Performed by: PHYSICIAN ASSISTANT

## 2018-01-19 PROCEDURE — 80048 BASIC METABOLIC PNL TOTAL CA: CPT | Performed by: PHYSICIAN ASSISTANT

## 2018-01-19 PROCEDURE — 88185 FLOWCYTOMETRY/TC ADD-ON: CPT | Performed by: PHYSICIAN ASSISTANT

## 2018-01-19 PROCEDURE — 77003 FLUOROGUIDE FOR SPINE INJECT: CPT

## 2018-01-19 PROCEDURE — 87205 SMEAR GRAM STAIN: CPT | Performed by: PHYSICIAN ASSISTANT

## 2018-01-19 PROCEDURE — 40001004 ZZHCL STATISTIC FLOW INT 9-15 ABY TC 88188: Performed by: PHYSICIAN ASSISTANT

## 2018-01-19 PROCEDURE — 25000131 ZZH RX MED GY IP 250 OP 636 PS 637: Performed by: PHYSICIAN ASSISTANT

## 2018-01-19 RX ORDER — MORPHINE SULFATE 30 MG/1
30 TABLET, FILM COATED, EXTENDED RELEASE ORAL EVERY 8 HOURS
Qty: 120 TABLET | Refills: 0 | Status: SHIPPED | OUTPATIENT
Start: 2018-01-27 | End: 2018-02-21

## 2018-01-19 RX ORDER — OXYCODONE HYDROCHLORIDE 30 MG/1
30 TABLET ORAL EVERY 4 HOURS PRN
Qty: 180 TABLET | Refills: 0 | Status: SHIPPED | OUTPATIENT
Start: 2018-01-27 | End: 2018-01-22

## 2018-01-19 RX ADMIN — METHOTREXATE: 25 INJECTION, SOLUTION INTRA-ARTERIAL; INTRAMUSCULAR; INTRATHECAL; INTRAVENOUS at 14:38

## 2018-01-19 RX ADMIN — LEVOTHYROXINE SODIUM 224 MCG: 112 TABLET ORAL at 08:19

## 2018-01-19 RX ADMIN — OXYCODONE HYDROCHLORIDE 30 MG: 30 TABLET ORAL at 06:45

## 2018-01-19 RX ADMIN — CELECOXIB 200 MG: 200 CAPSULE ORAL at 19:43

## 2018-01-19 RX ADMIN — PREDNISONE 60 MG: 50 TABLET ORAL at 18:24

## 2018-01-19 RX ADMIN — OXYCODONE HYDROCHLORIDE 30 MG: 30 TABLET ORAL at 03:01

## 2018-01-19 RX ADMIN — MONTELUKAST SODIUM 20 MG: 10 TABLET, COATED ORAL at 19:43

## 2018-01-19 RX ADMIN — OXYCODONE HYDROCHLORIDE 30 MG: 30 TABLET ORAL at 15:40

## 2018-01-19 RX ADMIN — CETIRIZINE HYDROCHLORIDE 10 MG: 10 TABLET, FILM COATED ORAL at 19:44

## 2018-01-19 RX ADMIN — PREDNISONE 60 MG: 50 TABLET ORAL at 08:19

## 2018-01-19 RX ADMIN — DIAZEPAM 5 MG: 5 TABLET ORAL at 08:20

## 2018-01-19 RX ADMIN — CROMOLYN SODIUM 1 DROP: 40 SOLUTION/ DROPS OPHTHALMIC at 15:40

## 2018-01-19 RX ADMIN — CROMOLYN SODIUM 1 DROP: 40 SOLUTION/ DROPS OPHTHALMIC at 08:21

## 2018-01-19 RX ADMIN — CYCLOBENZAPRINE HYDROCHLORIDE 10 MG: 10 TABLET, FILM COATED ORAL at 13:36

## 2018-01-19 RX ADMIN — MONTELUKAST SODIUM 20 MG: 10 TABLET, COATED ORAL at 08:20

## 2018-01-19 RX ADMIN — RANITIDINE 75 MG: 75 TABLET, FILM COATED ORAL at 08:20

## 2018-01-19 RX ADMIN — CETIRIZINE HYDROCHLORIDE 10 MG: 10 TABLET, FILM COATED ORAL at 13:36

## 2018-01-19 RX ADMIN — CALCITRIOL 0.25 MCG: 0.25 CAPSULE, LIQUID FILLED ORAL at 19:43

## 2018-01-19 RX ADMIN — OXYCODONE HYDROCHLORIDE 30 MG: 30 TABLET ORAL at 19:43

## 2018-01-19 RX ADMIN — SENNOSIDES AND DOCUSATE SODIUM 2 TABLET: 8.6; 5 TABLET ORAL at 19:43

## 2018-01-19 RX ADMIN — Medication 2 TABLET: at 08:33

## 2018-01-19 RX ADMIN — CROMOLYN SODIUM 1 ML: 5.2 SPRAY, METERED NASAL at 08:26

## 2018-01-19 RX ADMIN — ETOPOSIDE 145 MG: 20 INJECTION, SOLUTION, CONCENTRATE INTRAVENOUS at 00:29

## 2018-01-19 RX ADMIN — DIAZEPAM 5 MG: 5 TABLET ORAL at 13:38

## 2018-01-19 RX ADMIN — CYCLOBENZAPRINE HYDROCHLORIDE 10 MG: 10 TABLET, FILM COATED ORAL at 08:33

## 2018-01-19 RX ADMIN — HYDROCHLOROTHIAZIDE 12.5 MG: 12.5 CAPSULE ORAL at 08:20

## 2018-01-19 RX ADMIN — ONDANSETRON HYDROCHLORIDE 16 MG: 8 TABLET, FILM COATED ORAL at 19:44

## 2018-01-19 RX ADMIN — MORPHINE SULFATE 30 MG: 30 TABLET, EXTENDED RELEASE ORAL at 13:38

## 2018-01-19 RX ADMIN — FUROSEMIDE 20 MG: 20 TABLET ORAL at 08:20

## 2018-01-19 RX ADMIN — RANITIDINE 75 MG: 75 TABLET, FILM COATED ORAL at 13:35

## 2018-01-19 RX ADMIN — ETOPOSIDE 145 MG: 20 INJECTION, SOLUTION, CONCENTRATE INTRAVENOUS at 23:23

## 2018-01-19 RX ADMIN — VINCRISTINE SULFATE 0.8 MG: 1 INJECTION, SOLUTION INTRAVENOUS at 00:29

## 2018-01-19 RX ADMIN — METHOCARBAMOL 750 MG: 750 TABLET ORAL at 13:36

## 2018-01-19 RX ADMIN — ACYCLOVIR 400 MG: 400 TABLET ORAL at 08:21

## 2018-01-19 RX ADMIN — DICLOFENAC SODIUM 2 G: 10 GEL TOPICAL at 21:45

## 2018-01-19 RX ADMIN — DICLOFENAC SODIUM 2 G: 10 GEL TOPICAL at 08:25

## 2018-01-19 RX ADMIN — CALCITRIOL 0.5 MCG: 0.5 CAPSULE, LIQUID FILLED ORAL at 08:19

## 2018-01-19 RX ADMIN — CHOLECALCIFEROL CAP 125 MCG (5000 UNIT) 5000 UNITS: 125 CAP at 19:42

## 2018-01-19 RX ADMIN — Medication 2 TABLET: at 19:43

## 2018-01-19 RX ADMIN — OXYCODONE HYDROCHLORIDE 30 MG: 30 TABLET ORAL at 12:00

## 2018-01-19 RX ADMIN — MORPHINE SULFATE 60 MG: 60 TABLET, EXTENDED RELEASE ORAL at 18:24

## 2018-01-19 RX ADMIN — ALLOPURINOL 300 MG: 300 TABLET ORAL at 08:20

## 2018-01-19 RX ADMIN — CROMOLYN SODIUM 1 DROP: 40 SOLUTION/ DROPS OPHTHALMIC at 13:38

## 2018-01-19 RX ADMIN — CELECOXIB 200 MG: 200 CAPSULE ORAL at 08:20

## 2018-01-19 RX ADMIN — CETIRIZINE HYDROCHLORIDE 10 MG: 10 TABLET, FILM COATED ORAL at 08:21

## 2018-01-19 RX ADMIN — ACYCLOVIR 400 MG: 400 TABLET ORAL at 19:43

## 2018-01-19 RX ADMIN — DIAZEPAM 5 MG: 5 TABLET ORAL at 19:43

## 2018-01-19 RX ADMIN — LIDOCAINE HYDROCHLORIDE 10 ML: 10 INJECTION, SOLUTION EPIDURAL; INFILTRATION; INTRACAUDAL; PERINEURAL at 14:54

## 2018-01-19 RX ADMIN — FUROSEMIDE 20 MG: 20 TABLET ORAL at 15:40

## 2018-01-19 RX ADMIN — METHOCARBAMOL 750 MG: 750 TABLET ORAL at 19:43

## 2018-01-19 RX ADMIN — METHOCARBAMOL 750 MG: 750 TABLET ORAL at 08:19

## 2018-01-19 RX ADMIN — CROMOLYN SODIUM 1 DROP: 40 SOLUTION/ DROPS OPHTHALMIC at 19:42

## 2018-01-19 RX ADMIN — RANITIDINE 75 MG: 75 TABLET, FILM COATED ORAL at 19:43

## 2018-01-19 RX ADMIN — ACETAMINOPHEN 650 MG: 325 TABLET, FILM COATED ORAL at 18:31

## 2018-01-19 RX ADMIN — MORPHINE SULFATE 30 MG: 30 TABLET, EXTENDED RELEASE ORAL at 08:19

## 2018-01-19 RX ADMIN — POTASSIUM CHLORIDE 20 MEQ: 750 TABLET, EXTENDED RELEASE ORAL at 08:19

## 2018-01-19 RX ADMIN — VINCRISTINE SULFATE 0.8 MG: 1 INJECTION, SOLUTION INTRAVENOUS at 22:19

## 2018-01-19 RX ADMIN — CYCLOBENZAPRINE HYDROCHLORIDE 10 MG: 10 TABLET, FILM COATED ORAL at 19:43

## 2018-01-19 RX ADMIN — METHOCARBAMOL 750 MG: 750 TABLET ORAL at 15:40

## 2018-01-19 ASSESSMENT — PAIN DESCRIPTION - DESCRIPTORS
DESCRIPTORS: ACHING;CONSTANT
DESCRIPTORS: ACHING;CONSTANT
DESCRIPTORS: CONSTANT
DESCRIPTORS: ACHING;CONSTANT
DESCRIPTORS: ACHING;CONSTANT
DESCRIPTORS: CONSTANT;ACHING

## 2018-01-19 NOTE — PLAN OF CARE
Problem: Patient Care Overview  Goal: Plan of Care/Patient Progress Review  Outcome: No Change  VSS. Afebrile. C/o chronic back and chest pain. Scheduled Oxycodone given. Pt tolerated rapid Rituxan infusion without incident. Dose #1 of CIVI Etoposide and CIVI magdalena/dox infusing with +BR q4hrs via PORT. Up ad inocencio. Writing intake and output on white board. Pharmacy does not have fiber gummies here; pt said it's ok to not take while inpt. Will resume at home. On scheduled bowel meds for constipation. Pt's son's wife is in labor and is very excited to be a grandma for the first time. No acute events. Cont POC.

## 2018-01-19 NOTE — TELEPHONE ENCOUNTER
Approved scripts dropped at [facility] per her request - she was informed of forward date.     New disability parking certificate mailed to her home per her request.

## 2018-01-19 NOTE — PROGRESS NOTES
Nursing Focus: Chemotherapy  D: Positive blood return via PORT. Insertion site is clean/dry/intact, dressing intact with no complaints of pain.  Urine output is recorded in intake in Doc Flowsheet.    I: Premedications given per order (see electronic medical administration record). Dose #1 of CIVI Etoposide and CIVI Shaun/Dox started to infuse over 23 hours. Reviewed pt teaching on chemotherapy side effects.  Pt denies need for further teaching. Chemotherapy double checked per protocol by two chemotherapy competent RN's.   A: Tolerating procedure well. Denies nausea and or pain.   P: Continue to monitor urine output and symptoms of nausea. Screen for symptoms of toxicity.

## 2018-01-19 NOTE — PROGRESS NOTES
"[facility], [place]    Hematology / Oncology Progress Note    Date of Service (when I saw the patient): 01/19/2018     Assessment & Plan   Tisha Arias is a 57 year old female with high grade DLBCL and PMHx significant for breast cancer s/p bilateral mastectomies & BENJIE/BSO, papillary thyroid cancer s/p total thyroidectomy, chronic chest wall pain, muscle spasms, asthma, MCAS and h/o Rachael en Y gastric bypass, who is admitted for Cycle 6 of DA-R-EPOCH.       Problem List:    HEME/ONC  # High grade B cell lymphoma. \"Double hit-like\". Primary is Dr. Sauceda. Patient diagnosed August 2017. Lytic lesion of right 10th rib with extension. Disease localized above the diaphragm in thoracic and paravertebral soft tissue and mediastinal lymphadenopathy. 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 Staging LP and BMBx were negative for lymphoma. Initiated cycle 1 DA-R-EPOCH on 10/6/17. Has completed 5 cycles. She now presents for cycle 6 DA-R-EPOCH, appropriate for 20% dose increase of cytoxan, etoposide and doxorubicin per protocol. Had PET showing CR on 12/21/17.      REGIMEN: Cycle 6 DA-R-EPOCH (Day 1=1/18/18); Today is D2    Rituxan 750 mg D1 (completed)  Prednisone 60 mg bid D1-5  Etoposide 145 mg CIVI D1-4  Vincristine 0.8 mg CIVI D1-4  Doxorubicin 29 mg CIVI D1-4  Cytoxan 2170 mg D5  IT MTX D2- Will be done today (1/19) under fluoroscopy at 1pm  Premeds: Tylenol, benadryl, zofran 16 mg D1-5, Emend 150 mg D5  Dex 8 mg D6 and 7  Neulasta D6      #Anemia. 2/2 chemotherapy. Do not anticipate any transfusion needs during this hospitalization  - Hgb >7 and plt >10k       #Stage 1, ER/MS-positive, HER2-negative breast cancer. Follows with Dr. Crawley. Diagnosed in 2011. Oncotype recurrence score of 11 (7% recurrence risk after 5 years of tamoxifen). S/p bilateral " mastectomies and BENJIE/BSO in 2012. Was initially on Arimidex ([id number]) but changed to Tamoxifen due to arthralgias. Tamoxifen held since 10/5 and continue to hold with chemotherapy. Last f/u Dr. Crawley was on 11/27/17.      # Papillary thyroid cancer. Follows with Dr. Castro. Diagnosed in 2013. S/p total thyroidectomy and CND. Received ETOH treatment August 2014, October 2014 and December 2014. Has post-surgical hypothyroidism.  -Continue PTA Levothyroxine.  -Continue PTA calcitriol.      GI  # H/o Rachael en Y gastric bypass. Likely affecting absorption, thus patient is on multiple supplements.  - Continue supplements (calcium citrate-vitamin D, vitamin D3, magnesium citrate). Also has iron deficiency anemia likely from poor absorption, continue 325mg PO ferrous sulfate TID (resume outpatient).       Pain  # Chronic chest wall pain after mastectomy.   -Continue MS Contin 30/30/60 mg TID.  -Oxycodone 15-30 mg q4hrs.  -Celebrex 200 BID.   -Lidocaine cream prn.      #Chronic muscle cramps.   - Robaxin 750 mg QID, Valium 5 mg TID, Flexeril tid.      ID  #Anti-infectives.  - Continue PTA acyclovir  - Held Levaquin and fluconazole ppx as patient is not neutropenic.   - Resumed on discharge or when neutropenic      CV  #Lymphedema of lower extremities.  - On HCTZ & lasix PTA   - Will give some IV lasix if not having a good response to PO      PULM  #Asthma, allergies.   -Continue PTA singular, zyrtec.      FEN  -IVF per chemo regimen.  -PTA electrolyte replacements and lyte protocol.  -RDAT.      PPx  -VTE: lovenox held prior to LP  -PUD: PTA ranitidine.  -Bowels: PTA docusate and miralax.       FULL CODE      DISPO: After completion of chemo, anticipate 5 day hospitalization     Kady Kaufman PA-C  Hematology/Oncology  Pager: [phone]    Interval History   States she is feeling well. No new complaints. Tired and still having a lot of swelling in her legs bilaterally. No SOB or new chest pain or cough. Eating ok.  Still constipated, will take miralax today.     Physical Exam   Temp: 96.1  F (35.6  C) Temp src: Oral BP: 115/59   Heart Rate: 72 Resp: 18 SpO2: 95 % O2 Device: None (Room air)    Vitals:    01/18/18 1638 01/19/18 0744   Weight: 88.5 kg (195 lb 3.2 oz) 88.4 kg (194 lb 14.4 oz)     Vital Signs with Ranges  Temp:  [96.1  F (35.6  C)-98.3  F (36.8  C)] 96.1  F (35.6  C)  Heart Rate:  [] 72  Resp:  [16-18] 18  BP: ()/(48-65) 115/59  SpO2:  [94 %-97 %] 95 %  I/O last 3 completed shifts:  In: 2648.4 [P.O.:1620; I.V.:30; IV Piggyback:998.4]  Out: 900 [Urine:900]    Constitutional: Well appearing, no acute distress  Eyes:  sclera anicteric, conjunctiva clear  HEENT: normocephalic, atraumatic, alopecia, MMM, no mucositis or thrush  Respiratory: clear to auscultation bilaterally  Cardiovascular: RRR, S1S2, no m/r/g  GI: soft, nondistended, mildly tender LLQ without any palpable masses or splenomegaly. No rebound or guarding  Genitourinary: deferred  Skin: no visible rashes, port in right chest is c/d/i  Musculoskeletal: +2 b/l LE edema, compression stockings in place  Neurologic: A&O x 4, no focal deficits, normal gait  Psychiatric: appropriate affect       Medications     - MEDICATION INSTRUCTIONS -       NaCl         acyclovir  400 mg Oral BID     celecoxib  200 mg Oral BID     heparin lock flush  5-10 mL Intracatheter Q24H     heparin  5 mL Intracatheter Q28 Days     calcitRIOL  0.5 mcg Oral Daily     calcitRIOL  0.25 mcg Oral QPM     calcium citrate-vitamin D  2 tablet Oral BID     cetirizine  10 mg Oral TID     cholecalciferol  5,000 Units Oral Daily     cromolyn  1 drop Both Eyes 4x Daily     cromolyn sodium  1 spray Nasal Daily     diazepam  5 mg Oral TID     diclofenac  2 g Topical BID     furosemide  20 mg Oral BID     hydrochlorothiazide  12.5 mg Oral Daily     levothyroxine  224 mcg Oral QAM AC     methocarbamol  750 mg Oral 4x Daily     montelukast  20 mg Oral BID     morphine  30 mg Oral BID      morphine  60 mg Oral At Bedtime     potassium chloride SA  20 mEq Oral Daily     ranitidine  75 mg Oral TID     cyclobenzaprine  10 mg Oral TID     oxyCODONE IR  30 mg Oral Q4H     ondansetron  16 mg Oral Q23H     [START ON 1/22/2018] fosaprepitant (EMEND) 150 mg intermittent infusion  150 mg Intravenous Once     [START ON 1/24/2018] dexamethasone  8 mg Oral Daily     INTRATHECAL chemo - Cytarabine and/or methotrexate and/or Hydrocortisone   Intrathecal Once     predniSONE  30 mg/m2 (Treatment Plan Recorded) Oral BID     Chemotherapy Infusing-Continuous Infusion   Does not apply Q8H     etoposide (TOPOSAR) chemo infusion  72 mg/m2 (Treatment Plan Recorded) Intravenous Q23H     vinCRIStine/DOXOrubicin (ONCOVIN/ADRIAMYCIN) chemo infusion  0.4 mg/m2 (Treatment Plan Recorded) Intravenous Q23H     [START ON 1/22/2018] cyclophosphamide (CYTOXAN) chemo infusion  1,080 mg/m2 (Treatment Plan Recorded) Intravenous Once     [START ON 1/23/2018] filgrastim (NEUPOGEN/GRANIX) intravenous  5 mcg/kg (Treatment Plan Recorded) Intravenous Daily at 8 pm     senna-docusate  2 tablet Oral BID     allopurinol  300 mg Oral Daily       Data   Results for orders placed or performed during the hospital encounter of 01/18/18 (from the past 24 hour(s))   CBC with platelets differential   Result Value Ref Range    WBC 7.6 4.0 - 11.0 10e9/L    RBC Count 2.88 (L) 3.8 - 5.2 10e12/L    Hemoglobin 9.2 (L) 11.7 - 15.7 g/dL    Hematocrit 29.5 (L) 35.0 - 47.0 %     (H) 78 - 100 fl    MCH 31.9 26.5 - 33.0 pg    MCHC 31.2 (L) 31.5 - 36.5 g/dL    RDW 18.3 (H) 10.0 - 15.0 %    Platelet Count 265 150 - 450 10e9/L    Diff Method Automated Method     % Neutrophils 93.5 %    % Lymphocytes 3.4 %    % Monocytes 2.1 %    % Eosinophils 0.0 %    % Basophils 0.3 %    % Immature Granulocytes 0.7 %    Nucleated RBCs 0 0 /100    Absolute Neutrophil 7.1 1.6 - 8.3 10e9/L    Absolute Lymphocytes 0.3 (L) 0.8 - 5.3 10e9/L    Absolute Monocytes 0.2 0.0 - 1.3 10e9/L     Absolute Eosinophils 0.0 0.0 - 0.7 10e9/L    Absolute Basophils 0.0 0.0 - 0.2 10e9/L    Abs Immature Granulocytes 0.1 0 - 0.4 10e9/L    Absolute Nucleated RBC 0.0    Basic metabolic panel   Result Value Ref Range    Sodium 142 133 - 144 mmol/L    Potassium 4.1 3.4 - 5.3 mmol/L    Chloride 106 94 - 109 mmol/L    Carbon Dioxide 27 20 - 32 mmol/L    Anion Gap 9 3 - 14 mmol/L    Glucose 142 (H) 70 - 99 mg/dL    Urea Nitrogen 16 7 - 30 mg/dL    Creatinine 0.88 0.52 - 1.04 mg/dL    GFR Estimate 66 >60 mL/min/1.7m2    GFR Estimate If Black 80 >60 mL/min/1.7m2    Calcium 8.6 8.5 - 10.1 mg/dL     *Note: Due to a large number of results and/or encounters for the requested time period, some results have not been displayed. A complete set of results can be found in Results Review.

## 2018-01-19 NOTE — PLAN OF CARE
"Problem: Chemotherapy Effects (Adult)  Goal: Signs and Symptoms of Listed Potential Problems Will be Absent, Minimized or Managed (Chemotherapy Effects)  Signs and symptoms of listed potential problems will be absent, minimized or managed by discharge/transition of care (reference Chemotherapy Effects (Adult) CPG).     VSS. Afebrile. Patient states \"I always have pain\" when asked during her assessment. Has scheduled oxy and MS which manages her chest and back pain. Up independently. Appetite good.  here to visit and brought in food. Here for cycle # 6 of R-EPOCH. Will have dose # 1 of rituxan hung shortly. Will need CIVI etoposide and vincristine hung on the night shift. Anxious about having to be here which she relays is \"normal for her.\" Does have scheduled anti-anxiolytics ordered. Voiding. Had BM today but relays that she has a lot of constipation issues and her stool today was like \"hard caryl.\" Given HS dose of senna. Denies any skin issues but does have quite a bit of LE edema. Has gloria hose on per normal routine at home. Taken off tonight per her request.      "

## 2018-01-19 NOTE — PROGRESS NOTES
Chemotherapy  D: Blood return is positive per port a catheter. Urine output is adequate as recorded in intake and output flowsheet.   I: Premedications given (see electronic medical administration record). Dose #1 of rituxan started to infuse over 90 minutes.   A: Tolerating infusion.  P: Continue to monitor urine output and symptoms of nausea. Screen for symptoms of toxicity.

## 2018-01-19 NOTE — PLAN OF CARE
Problem: Chemotherapy Effects (Adult)  Goal: Signs and Symptoms of Listed Potential Problems Will be Absent, Minimized or Managed (Chemotherapy Effects)  Signs and symptoms of listed potential problems will be absent, minimized or managed by discharge/transition of care (reference Chemotherapy Effects (Adult) CPG).   Outcome: No Change  Chemotherapy infusing without problems. Good blood return from Port. Denies nausea. Oxycodone for chest pain with relief. Up independently.

## 2018-01-19 NOTE — TELEPHONE ENCOUNTER
Received VM from patient requesting refills of her pain medications - oxycodone and ms contin. She also reports she can't find her disability parking certificate application and asks if she can have another one filled out for her.      Last refills: 12/29/2017  Patient was seen in hospital during her last hospitalization by palliative team on 12/29/2017.  Scheduled for follow up 2/28/2018 (need to check with patient to see if this time works - attempted to coordinate with other apts she has in clinic that day)     Will route request to MD for review.     Reviewed [place]  Report.

## 2018-01-19 NOTE — PROGRESS NOTES
"SPIRITUAL HEALTH SERVICES  SPIRITUAL ASSESSMENT Progress Note  [facility] ([place]) 7D     PRIMARY FOCUS:     Emotional/spiritual/Hoahaoism distress    Support for coping    REFERRAL SOURCE: Elvin is known to writer from a previous work location.    ILLNESS CIRCUMSTANCES:   Reviewed documentation. Reflective conversation shared with Elvin which integrated elements of illness and family narratives.     Context of Serious Illness/Symptom(s) - Elvin is on her 6th cycle of chemotherapy. She notes that she has the \"most aggressive\" kind of lymphoma and is feeling \"weary of the fight.\"    Resources for Support - Elvin has some friends who offer support but admits that she doesn't let many people in \"my [place]    DISTRESS:     Emotional/Spiritual/Existential Distress - Elvin states she had a mastectomy due to breast cancer and verbalizes anger at the \"whys\" of the lymphoma diagnosis. Elvin feels stuck in the anger and unable to connect with her usual spiritual practices. We explored some spiritual practices and rituals that might help with this.    Latter-day Distress - See above    Social/Cultural/Economic Distress - Lack of support from important people in her life have left her wondering if she wants to let people into her \"cricle.\"    SPIRITUAL/Moravian COPING:     Yarsani/Isi - NA    Spiritual Practice(s) - Elvin practices a more Eastern approach to spirituality. Elvin is also connected to her Indigenous roots and uses jesus, sweet, grass and cedar to clear negative energy and promote positive energy. She has also used acupuncture, meditation and  herbs/oils to stay healthy spiritually. She has been disconnected from that due to her decision to pursue chemotherapy.     Emotional/Relational/Existential Connections - Elvin continues to build relationships with those around her. Her dad has been an especially strong source of strength and support in her life.    GOALS OF CARE:    Goals of Care - To make it through this " cycle of chemotherapy. Elvin is hoping it is the last.    Meaning/Sense-Making - Elvin is struggling to make meaning out of life right now and is open to on-going spiritual care visits for support in this area.     PLAN: I will see her on Monday for further assessment, support and on-going spiritual practices that might help with the release of emotions.    [facility]  Oncology   Pager [phone]

## 2018-01-20 LAB
ANION GAP SERPL CALCULATED.3IONS-SCNC: 9 MMOL/L (ref 3–14)
BASOPHILS # BLD AUTO: 0 10E9/L (ref 0–0.2)
BASOPHILS NFR BLD AUTO: 0.1 %
BUN SERPL-MCNC: 21 MG/DL (ref 7–30)
CALCIUM SERPL-MCNC: 8.1 MG/DL (ref 8.5–10.1)
CHLORIDE SERPL-SCNC: 108 MMOL/L (ref 94–109)
CO2 SERPL-SCNC: 26 MMOL/L (ref 20–32)
CREAT SERPL-MCNC: 0.87 MG/DL (ref 0.52–1.04)
DIFFERENTIAL METHOD BLD: ABNORMAL
EOSINOPHIL # BLD AUTO: 0 10E9/L (ref 0–0.7)
EOSINOPHIL NFR BLD AUTO: 0 %
ERYTHROCYTE [DISTWIDTH] IN BLOOD BY AUTOMATED COUNT: 18.8 % (ref 10–15)
GFR SERPL CREATININE-BSD FRML MDRD: 67 ML/MIN/1.7M2
GLUCOSE SERPL-MCNC: 135 MG/DL (ref 70–99)
HCT VFR BLD AUTO: 27.6 % (ref 35–47)
HGB BLD-MCNC: 8.6 G/DL (ref 11.7–15.7)
IMM GRANULOCYTES # BLD: 0 10E9/L (ref 0–0.4)
IMM GRANULOCYTES NFR BLD: 0.2 %
LYMPHOCYTES # BLD AUTO: 0.5 10E9/L (ref 0.8–5.3)
LYMPHOCYTES NFR BLD AUTO: 5.8 %
MCH RBC QN AUTO: 32.1 PG (ref 26.5–33)
MCHC RBC AUTO-ENTMCNC: 31.2 G/DL (ref 31.5–36.5)
MCV RBC AUTO: 103 FL (ref 78–100)
MONOCYTES # BLD AUTO: 0.3 10E9/L (ref 0–1.3)
MONOCYTES NFR BLD AUTO: 3 %
NEUTROPHILS # BLD AUTO: 8.2 10E9/L (ref 1.6–8.3)
NEUTROPHILS NFR BLD AUTO: 90.9 %
NRBC # BLD AUTO: 0 10*3/UL
NRBC BLD AUTO-RTO: 0 /100
PLATELET # BLD AUTO: 256 10E9/L (ref 150–450)
POTASSIUM SERPL-SCNC: 3.6 MMOL/L (ref 3.4–5.3)
POTASSIUM SERPL-SCNC: 4.1 MMOL/L (ref 3.4–5.3)
RBC # BLD AUTO: 2.68 10E12/L (ref 3.8–5.2)
SODIUM SERPL-SCNC: 143 MMOL/L (ref 133–144)
WBC # BLD AUTO: 9.1 10E9/L (ref 4–11)

## 2018-01-20 PROCEDURE — 36592 COLLECT BLOOD FROM PICC: CPT | Performed by: PHYSICIAN ASSISTANT

## 2018-01-20 PROCEDURE — 25000132 ZZH RX MED GY IP 250 OP 250 PS 637: Performed by: PHYSICIAN ASSISTANT

## 2018-01-20 PROCEDURE — 25000128 H RX IP 250 OP 636: Performed by: PHYSICIAN ASSISTANT

## 2018-01-20 PROCEDURE — 25000131 ZZH RX MED GY IP 250 OP 636 PS 637: Performed by: PHYSICIAN ASSISTANT

## 2018-01-20 PROCEDURE — 12000008 ZZH R&B INTERMEDIATE UMMC

## 2018-01-20 PROCEDURE — 84132 ASSAY OF SERUM POTASSIUM: CPT | Performed by: PHYSICIAN ASSISTANT

## 2018-01-20 PROCEDURE — 25000125 ZZHC RX 250: Performed by: PHYSICIAN ASSISTANT

## 2018-01-20 PROCEDURE — 85025 COMPLETE CBC W/AUTO DIFF WBC: CPT | Performed by: PHYSICIAN ASSISTANT

## 2018-01-20 PROCEDURE — 80048 BASIC METABOLIC PNL TOTAL CA: CPT | Performed by: PHYSICIAN ASSISTANT

## 2018-01-20 RX ORDER — GINSENG 100 MG
CAPSULE ORAL 2 TIMES DAILY
Status: DISCONTINUED | OUTPATIENT
Start: 2018-01-20 | End: 2018-01-22 | Stop reason: HOSPADM

## 2018-01-20 RX ORDER — FUROSEMIDE 10 MG/ML
40 INJECTION INTRAMUSCULAR; INTRAVENOUS ONCE
Status: COMPLETED | OUTPATIENT
Start: 2018-01-20 | End: 2018-01-20

## 2018-01-20 RX ORDER — POTASSIUM CHLORIDE 750 MG/1
20 TABLET, EXTENDED RELEASE ORAL ONCE
Status: COMPLETED | OUTPATIENT
Start: 2018-01-20 | End: 2018-01-20

## 2018-01-20 RX ADMIN — POTASSIUM CHLORIDE 20 MEQ: 750 TABLET, EXTENDED RELEASE ORAL at 08:06

## 2018-01-20 RX ADMIN — RANITIDINE 75 MG: 75 TABLET, FILM COATED ORAL at 20:39

## 2018-01-20 RX ADMIN — LEVOTHYROXINE SODIUM 224 MCG: 112 TABLET ORAL at 08:15

## 2018-01-20 RX ADMIN — HYDROCHLOROTHIAZIDE 12.5 MG: 12.5 CAPSULE ORAL at 08:05

## 2018-01-20 RX ADMIN — CHOLECALCIFEROL CAP 125 MCG (5000 UNIT) 5000 UNITS: 125 CAP at 20:39

## 2018-01-20 RX ADMIN — OXYCODONE HYDROCHLORIDE 30 MG: 30 TABLET ORAL at 20:40

## 2018-01-20 RX ADMIN — CROMOLYN SODIUM 1 DROP: 40 SOLUTION/ DROPS OPHTHALMIC at 08:06

## 2018-01-20 RX ADMIN — ETOPOSIDE 145 MG: 20 INJECTION, SOLUTION, CONCENTRATE INTRAVENOUS at 20:44

## 2018-01-20 RX ADMIN — RANITIDINE 75 MG: 75 TABLET, FILM COATED ORAL at 14:36

## 2018-01-20 RX ADMIN — CROMOLYN SODIUM 1 ML: 5.2 SPRAY, METERED NASAL at 08:10

## 2018-01-20 RX ADMIN — CROMOLYN SODIUM 1 DROP: 40 SOLUTION/ DROPS OPHTHALMIC at 16:09

## 2018-01-20 RX ADMIN — Medication 2 TABLET: at 08:06

## 2018-01-20 RX ADMIN — CETIRIZINE HYDROCHLORIDE 10 MG: 10 TABLET, FILM COATED ORAL at 08:06

## 2018-01-20 RX ADMIN — POTASSIUM CHLORIDE 20 MEQ: 750 TABLET, EXTENDED RELEASE ORAL at 10:21

## 2018-01-20 RX ADMIN — CELECOXIB 200 MG: 200 CAPSULE ORAL at 20:39

## 2018-01-20 RX ADMIN — ACYCLOVIR 400 MG: 400 TABLET ORAL at 19:45

## 2018-01-20 RX ADMIN — CYCLOBENZAPRINE HYDROCHLORIDE 10 MG: 10 TABLET, FILM COATED ORAL at 14:36

## 2018-01-20 RX ADMIN — METHOCARBAMOL 750 MG: 750 TABLET ORAL at 20:39

## 2018-01-20 RX ADMIN — CALCITRIOL 0.25 MCG: 0.25 CAPSULE, LIQUID FILLED ORAL at 19:45

## 2018-01-20 RX ADMIN — CETIRIZINE HYDROCHLORIDE 10 MG: 10 TABLET, FILM COATED ORAL at 14:36

## 2018-01-20 RX ADMIN — ONDANSETRON HYDROCHLORIDE 16 MG: 8 TABLET, FILM COATED ORAL at 19:45

## 2018-01-20 RX ADMIN — VINCRISTINE SULFATE 0.8 MG: 1 INJECTION, SOLUTION INTRAVENOUS at 20:44

## 2018-01-20 RX ADMIN — ALLOPURINOL 300 MG: 300 TABLET ORAL at 08:05

## 2018-01-20 RX ADMIN — MONTELUKAST SODIUM 20 MG: 10 TABLET, COATED ORAL at 08:05

## 2018-01-20 RX ADMIN — MORPHINE SULFATE 30 MG: 30 TABLET, EXTENDED RELEASE ORAL at 06:28

## 2018-01-20 RX ADMIN — DICLOFENAC SODIUM 2 G: 10 GEL TOPICAL at 20:47

## 2018-01-20 RX ADMIN — CETIRIZINE HYDROCHLORIDE 10 MG: 10 TABLET, FILM COATED ORAL at 20:40

## 2018-01-20 RX ADMIN — METHOCARBAMOL 750 MG: 750 TABLET ORAL at 08:05

## 2018-01-20 RX ADMIN — CROMOLYN SODIUM 1 DROP: 40 SOLUTION/ DROPS OPHTHALMIC at 21:09

## 2018-01-20 RX ADMIN — SENNOSIDES AND DOCUSATE SODIUM 2 TABLET: 8.6; 5 TABLET ORAL at 08:15

## 2018-01-20 RX ADMIN — DIAZEPAM 5 MG: 5 TABLET ORAL at 20:40

## 2018-01-20 RX ADMIN — SODIUM CHLORIDE, PRESERVATIVE FREE 5 ML: 5 INJECTION INTRAVENOUS at 06:27

## 2018-01-20 RX ADMIN — DIAZEPAM 5 MG: 5 TABLET ORAL at 08:05

## 2018-01-20 RX ADMIN — SENNOSIDES AND DOCUSATE SODIUM 2 TABLET: 8.6; 5 TABLET ORAL at 20:39

## 2018-01-20 RX ADMIN — OXYCODONE HYDROCHLORIDE 30 MG: 30 TABLET ORAL at 04:06

## 2018-01-20 RX ADMIN — MORPHINE SULFATE 60 MG: 60 TABLET, EXTENDED RELEASE ORAL at 19:45

## 2018-01-20 RX ADMIN — CYCLOBENZAPRINE HYDROCHLORIDE 10 MG: 10 TABLET, FILM COATED ORAL at 08:05

## 2018-01-20 RX ADMIN — BACITRACIN: 500 OINTMENT TOPICAL at 20:45

## 2018-01-20 RX ADMIN — OXYCODONE HYDROCHLORIDE 30 MG: 30 TABLET ORAL at 16:09

## 2018-01-20 RX ADMIN — METHOCARBAMOL 750 MG: 750 TABLET ORAL at 16:09

## 2018-01-20 RX ADMIN — PREDNISONE 60 MG: 50 TABLET ORAL at 08:05

## 2018-01-20 RX ADMIN — DIAZEPAM 5 MG: 5 TABLET ORAL at 14:36

## 2018-01-20 RX ADMIN — CROMOLYN SODIUM 1 DROP: 40 SOLUTION/ DROPS OPHTHALMIC at 12:22

## 2018-01-20 RX ADMIN — POLYETHYLENE GLYCOL 3350 17 G: 17 POWDER, FOR SOLUTION ORAL at 19:43

## 2018-01-20 RX ADMIN — FUROSEMIDE 20 MG: 20 TABLET ORAL at 08:06

## 2018-01-20 RX ADMIN — FUROSEMIDE 40 MG: 10 INJECTION, SOLUTION INTRAVENOUS at 10:22

## 2018-01-20 RX ADMIN — CELECOXIB 200 MG: 200 CAPSULE ORAL at 08:15

## 2018-01-20 RX ADMIN — MORPHINE SULFATE 30 MG: 30 TABLET, EXTENDED RELEASE ORAL at 12:22

## 2018-01-20 RX ADMIN — CALCITRIOL 0.5 MCG: 0.5 CAPSULE, LIQUID FILLED ORAL at 08:06

## 2018-01-20 RX ADMIN — OXYCODONE HYDROCHLORIDE 30 MG: 30 TABLET ORAL at 08:06

## 2018-01-20 RX ADMIN — MONTELUKAST SODIUM 20 MG: 10 TABLET, COATED ORAL at 20:39

## 2018-01-20 RX ADMIN — BACITRACIN: 500 OINTMENT TOPICAL at 10:22

## 2018-01-20 RX ADMIN — Medication 2 TABLET: at 19:45

## 2018-01-20 RX ADMIN — FUROSEMIDE 20 MG: 20 TABLET ORAL at 16:09

## 2018-01-20 RX ADMIN — ACYCLOVIR 400 MG: 400 TABLET ORAL at 08:06

## 2018-01-20 RX ADMIN — OXYCODONE HYDROCHLORIDE 30 MG: 30 TABLET ORAL at 00:30

## 2018-01-20 RX ADMIN — PREDNISONE 60 MG: 50 TABLET ORAL at 18:35

## 2018-01-20 RX ADMIN — DICLOFENAC SODIUM 2 G: 10 GEL TOPICAL at 08:06

## 2018-01-20 RX ADMIN — OXYCODONE HYDROCHLORIDE 30 MG: 30 TABLET ORAL at 12:22

## 2018-01-20 RX ADMIN — RANITIDINE 75 MG: 75 TABLET, FILM COATED ORAL at 08:05

## 2018-01-20 RX ADMIN — CYCLOBENZAPRINE HYDROCHLORIDE 10 MG: 10 TABLET, FILM COATED ORAL at 20:40

## 2018-01-20 RX ADMIN — METHOCARBAMOL 750 MG: 750 TABLET ORAL at 12:22

## 2018-01-20 ASSESSMENT — PAIN DESCRIPTION - DESCRIPTORS
DESCRIPTORS: ACHING;CONSTANT

## 2018-01-20 NOTE — PLAN OF CARE
Problem: Patient Care Overview  Goal: Plan of Care/Patient Progress Review  Outcome: No Change  00:00-07:00 am  AF VSS  Day 2 CIVI chemo infusing  Tolerating well thus far  No N/V  Chest and lower back pain now adequately well controlled with current scheduled pain regimen  Pt appears slightly lethargic but aroused easily  Oriented x 4  Up ad inocencio and voiding well  Good UOP   Slept well and no acute issues overnight  Continue w/POC

## 2018-01-20 NOTE — PLAN OF CARE
Problem: Chemotherapy Effects (Adult)  Goal: Signs and Symptoms of Listed Potential Problems Will be Absent, Minimized or Managed (Chemotherapy Effects)  Signs and symptoms of listed potential problems will be absent, minimized or managed by discharge/transition of care (reference Chemotherapy Effects (Adult) CPG).   Outcome: No Change  AVSS. Afebrile. A&Ox4. Denies N/V and pain. Good UOP.  PRN Tylenol given x1 for HA with relief.  LP site CDI, covered with bandaid. Up with SBA. Pain well controlled with current regimen.  Continue to monitor and with POC.      Nursing Focus: Chemotherapy  D: Positive blood return via port Insertion site is clean/dry/intact, dressing intact with no complaints of pain.  Urine output is recorded in intake in Doc Flowsheet.    I: Premedications given per order (see electronic medical administration record). Dose #2 of vincristine/doxorubicin started to infuse over 24hours/minutes. Reviewed pt teaching on chemotherapy side effects.  Pt denies need for further teaching. Chemotherapy double checked per protocol by two chemotherapy competent RN's.   A: Tolerating procedure well. Denies nausea and or pain.   P: Continue to monitor urine output and symptoms of nausea. Screen for symptoms of toxicity.

## 2018-01-20 NOTE — PLAN OF CARE
Problem: Chemotherapy Effects (Adult)  Goal: Signs and Symptoms of Listed Potential Problems Will be Absent, Minimized or Managed (Chemotherapy Effects)  Signs and symptoms of listed potential problems will be absent, minimized or managed by discharge/transition of care (reference Chemotherapy Effects (Adult) CPG).   Outcome: No Change  Doing well. Chest pain controlled by scheduled pain medications. Good blood return from Port. Denies nausea. Up independently. Given extra dose of IV Lasix with a good response. Potassium rechecked after Lasix dose and it was 4.1.

## 2018-01-20 NOTE — PROGRESS NOTES
"[facility]    Hematology / Oncology Progress Note    Date of Service (when I saw the patient): 01/20/2018     Assessment & Plan   Tisha Arias is a 57 year old female with high grade DLBCL and PMHx significant for breast cancer s/p bilateral mastectomies & BENJIE/BSO, papillary thyroid cancer s/p total thyroidectomy, chronic chest wall pain, muscle spasms, asthma, MCAS and h/o Rachael en Y gastric bypass, who is admitted for Cycle 6 of DA-R-EPOCH.       Problem List:    HEME/ONC  # High grade B cell lymphoma. \"Double hit-like\". Primary is Dr. Sauceda. Patient diagnosed August 2017. Lytic lesion of right 10th rib with extension. Disease localized above the diaphragm in thoracic and paravertebral soft tissue and mediastinal lymphadenopathy. 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 Staging LP and BMBx were negative for lymphoma. Initiated cycle 1 DA-R-EPOCH on 10/6/17. Has completed 5 cycles. She now presents for cycle 6 DA-R-EPOCH, appropriate for 20% dose increase of cytoxan, etoposide and doxorubicin per protocol. Had PET showing CR on 12/21/17.      REGIMEN: Cycle 6 DA-R-EPOCH (Day 1=1/18/18); Today is D3    Rituxan 750 mg D1 (completed)  Prednisone 60 mg bid D1-5  Etoposide 145 mg CIVI D1-4  Vincristine 0.8 mg CIVI D1-4  Doxorubicin 29 mg CIVI D1-4  Cytoxan 2170 mg D5  IT MTX D2- Done on 1/19; flow pending  Premeds: Tylenol, benadryl, zofran 16 mg D1-5, Emend 150 mg D5  Dex 8 mg D6 and 7  Neulasta D6      #Anemia. 2/2 chemotherapy. Do not anticipate any transfusion needs during this hospitalization  - Hgb >7 and plt >10k       #Stage 1, ER/SC-positive, HER2-negative breast cancer. Follows with Dr. Crawley. Diagnosed in 2011. Oncotype recurrence score of 11 (7% recurrence risk after 5 years of tamoxifen). S/p bilateral mastectomies and BENJIE/BSO in " 2012. Was initially on Arimidex ([id number]) but changed to Tamoxifen due to arthralgias. Tamoxifen held since 10/5 and continue to hold with chemotherapy. Last f/u Dr. Crawley was on 11/27/17.      # Papillary thyroid cancer. Follows with Dr. Castro. Diagnosed in 2013. S/p total thyroidectomy and CND. Received ETOH treatment August 2014, October 2014 and December 2014. Has post-surgical hypothyroidism.  -Continue PTA Levothyroxine.  -Continue PTA calcitriol.      GI  # H/o Rachael en Y gastric bypass. Likely affecting absorption, thus patient is on multiple supplements.  - Continue supplements (calcium citrate-vitamin D, vitamin D3, magnesium citrate). Also has iron deficiency anemia likely from poor absorption, continue 325mg PO ferrous sulfate TID (resume outpatient).       Pain  # Chronic chest wall pain after mastectomy.   -Continue MS Contin 30/30/60 mg TID.  -Oxycodone 15-30 mg q4hrs.  -Celebrex 200 BID.   -Lidocaine cream prn.      #Chronic muscle cramps.   - Robaxin 750 mg QID, Valium 5 mg TID, Flexeril tid.      ID  #Anti-infectives.  - Continue PTA acyclovir  - Held Levaquin and fluconazole ppx as patient is not neutropenic.   - Resumed on discharge or when neutropenic      CV  #Lymphedema of lower extremities.  - On HCTZ & lasix PTA   - IV lasix 40 mg IV today with extra 20 meq potassium and potassium recheck later today      PULM  #Asthma, allergies.   -Continue PTA singular, zyrtec.      FEN  -IVF per chemo regimen.  -PTA electrolyte replacements and lyte protocol.  -RDAT.      PPx  -VTE: lovenox held prior to LP  -PUD: PTA ranitidine.  -Bowels: PTA docusate and miralax.       FULL CODE      DISPO: After completion of chemo, anticipate 5 day hospitalization, likely d/c on 1/22     Kady Kaufman PA-C  Hematology/Oncology  Pager: [phone]    Interval History   States she is feeling well. No new complaints. Tired and still having a lot of swelling in her legs bilaterally. No SOB or new chest pain or  cough. Eating ok. Still constipated with small BM this morning, will take miralax today.     Physical Exam   Temp: 98.7  F (37.1  C) Temp src: Oral BP: 111/54   Heart Rate: 78 Resp: 18 SpO2: 94 % O2 Device: None (Room air)    Vitals:    01/19/18 0744 01/20/18 0730 01/20/18 0735   Weight: 88.4 kg (194 lb 14.4 oz) 87.8 kg (193 lb 9.6 oz) 89 kg (196 lb 4.8 oz)     Vital Signs with Ranges  Temp:  [96  F (35.6  C)-98.7  F (37.1  C)] 98.7  F (37.1  C)  Heart Rate:  [72-87] 78  Resp:  [16-18] 18  BP: ()/(52-64) 111/54  SpO2:  [92 %-95 %] 94 %  I/O last 3 completed shifts:  In: 2208 [P.O.:1020; I.V.:20; IV Piggyback:1168]  Out: 2250 [Urine:2250]    Constitutional: Well appearing, no acute distress  Eyes:  sclera anicteric, conjunctiva clear  HEENT: normocephalic, atraumatic, alopecia, MMM, no mucositis or thrush  Respiratory: clear to auscultation bilaterally  Cardiovascular: RRR, S1S2, no m/r/g  GI: soft, nondistended, mildly tender LLQ without any palpable masses or splenomegaly. No rebound or guarding  Genitourinary: deferred  Skin: no visible rashes, port in right chest is c/d/i  Musculoskeletal: +2 b/l LE edema, compression stockings in place  Neurologic: A&O x 4, no focal deficits, normal gait  Psychiatric: appropriate affect       Medications     - MEDICATION INSTRUCTIONS -       NaCl         bacitracin   Topical BID     acyclovir  400 mg Oral BID     celecoxib  200 mg Oral BID     heparin lock flush  5-10 mL Intracatheter Q24H     heparin  5 mL Intracatheter Q28 Days     calcitRIOL  0.5 mcg Oral Daily     calcitRIOL  0.25 mcg Oral QPM     calcium citrate-vitamin D  2 tablet Oral BID     cetirizine  10 mg Oral TID     cholecalciferol  5,000 Units Oral Daily     cromolyn  1 drop Both Eyes 4x Daily     cromolyn sodium  1 spray Nasal Daily     diazepam  5 mg Oral TID     diclofenac  2 g Topical BID     furosemide  20 mg Oral BID     hydrochlorothiazide  12.5 mg Oral Daily     levothyroxine  224 mcg Oral QAM AC      methocarbamol  750 mg Oral 4x Daily     montelukast  20 mg Oral BID     morphine  30 mg Oral BID     morphine  60 mg Oral At Bedtime     potassium chloride SA  20 mEq Oral Daily     ranitidine  75 mg Oral TID     cyclobenzaprine  10 mg Oral TID     oxyCODONE IR  30 mg Oral Q4H     ondansetron  16 mg Oral Q23H     [START ON 1/22/2018] fosaprepitant (EMEND) 150 mg intermittent infusion  150 mg Intravenous Once     [START ON 1/24/2018] dexamethasone  8 mg Oral Daily     predniSONE  30 mg/m2 (Treatment Plan Recorded) Oral BID     Chemotherapy Infusing-Continuous Infusion   Does not apply Q8H     etoposide (TOPOSAR) chemo infusion  72 mg/m2 (Treatment Plan Recorded) Intravenous Q23H     vinCRIStine/DOXOrubicin (ONCOVIN/ADRIAMYCIN) chemo infusion  0.4 mg/m2 (Treatment Plan Recorded) Intravenous Q23H     [START ON 1/22/2018] cyclophosphamide (CYTOXAN) chemo infusion  1,080 mg/m2 (Treatment Plan Recorded) Intravenous Once     [START ON 1/23/2018] filgrastim (NEUPOGEN/GRANIX) intravenous  5 mcg/kg (Treatment Plan Recorded) Intravenous Daily at 8 pm     senna-docusate  2 tablet Oral BID     allopurinol  300 mg Oral Daily       Data   Results for orders placed or performed during the hospital encounter of 01/18/18 (from the past 24 hour(s))   Glucose CSF: Tube 1   Result Value Ref Range    Glucose CSF 70 40 - 70 mg/dL   Protein total CSF: Tube 1   Result Value Ref Range    Protein Total CSF 58 15 - 60 mg/dL   Gram stain   Result Value Ref Range    Specimen Description Cerebrospinal fluid     Gram Stain No organisms seen     Gram Stain No WBC's seen     Gram Stain       Gram stain and culture performed on concentrated specimen   CSF Culture Aerobic Bacterial   Result Value Ref Range    Specimen Description Cerebrospinal fluid     Culture Micro Culture negative monitoring continues    Cell count with differential CSF: Tube 4   Result Value Ref Range    WBC CSF 2 0 - 5 /uL    RBC  (H) 0 - 2 /uL    Tube Number 4 #    Color  CSF Colorless CLRL^Colorless    Appearance CSF Clear CLER^Clear   XR Lumbar Punc Intrathecal Chemo Admin    Narrative    Lumbar Puncture using Fluoroscopy with intrathecal chemotherapy  injection    Provided History:  Chemotherapy for diffuse large B cell lymphoma.  ICD-10: Lumbar puncture       Procedure note: Verbal and written consent for lumbar puncture was  obtained from the patient, and benefits and risk of the procedure were  explained, including but not limited to worsening headache,  hemorrhage, infection, lower extremity pain, or nerve root injury. The  patient was sterilely prepped and draped with the patient in the prone  position, over the lower back. Under fluoroscopic guidance, the  interlaminar spaces were noted. 1% lidocaine was administered for  local anesthetic over the L3-L4 interlaminar space, and a 22 gauge 3.5  inch needle was advanced into the thecal sac under fluoroscopic  guidance.  There was initial show of clear CSF. Approximately 8 cc of  CSF were collected. Intrathecal chemotherapy therapy is administered  by patient's hematologist.    The needle was removed with the stylet in place. There was no  immediate complication associated with the procedure. Samples were  sent for the requested laboratory testing.      Estimated blood loss: Less than 1 cc.    Fluoroscopic time: 0.298 minute      Impression    Impression: Successful lumbar puncture without immediate complication.    I, SEA LACKEY MD, attest that I was present for all critical  portions of the procedure and was immediately available to provide  guidance and assistance during the remainder of the procedure.    I have personally reviewed the examination and initial interpretation  and I agree with the findings.    SEA LACKEY MD   CBC with platelets differential   Result Value Ref Range    WBC 9.1 4.0 - 11.0 10e9/L    RBC Count 2.68 (L) 3.8 - 5.2 10e12/L    Hemoglobin 8.6 (L) 11.7 - 15.7 g/dL    Hematocrit 27.6 (L) 35.0 -  47.0 %     (H) 78 - 100 fl    MCH 32.1 26.5 - 33.0 pg    MCHC 31.2 (L) 31.5 - 36.5 g/dL    RDW 18.8 (H) 10.0 - 15.0 %    Platelet Count 256 150 - 450 10e9/L    Diff Method Automated Method     % Neutrophils 90.9 %    % Lymphocytes 5.8 %    % Monocytes 3.0 %    % Eosinophils 0.0 %    % Basophils 0.1 %    % Immature Granulocytes 0.2 %    Nucleated RBCs 0 0 /100    Absolute Neutrophil 8.2 1.6 - 8.3 10e9/L    Absolute Lymphocytes 0.5 (L) 0.8 - 5.3 10e9/L    Absolute Monocytes 0.3 0.0 - 1.3 10e9/L    Absolute Eosinophils 0.0 0.0 - 0.7 10e9/L    Absolute Basophils 0.0 0.0 - 0.2 10e9/L    Abs Immature Granulocytes 0.0 0 - 0.4 10e9/L    Absolute Nucleated RBC 0.0    Basic metabolic panel   Result Value Ref Range    Sodium 143 133 - 144 mmol/L    Potassium 3.6 3.4 - 5.3 mmol/L    Chloride 108 94 - 109 mmol/L    Carbon Dioxide 26 20 - 32 mmol/L    Anion Gap 9 3 - 14 mmol/L    Glucose 135 (H) 70 - 99 mg/dL    Urea Nitrogen 21 7 - 30 mg/dL    Creatinine 0.87 0.52 - 1.04 mg/dL    GFR Estimate 67 >60 mL/min/1.7m2    GFR Estimate If Black 81 >60 mL/min/1.7m2    Calcium 8.1 (L) 8.5 - 10.1 mg/dL     *Note: Due to a large number of results and/or encounters for the requested time period, some results have not been displayed. A complete set of results can be found in Results Review.

## 2018-01-21 LAB
ANION GAP SERPL CALCULATED.3IONS-SCNC: 8 MMOL/L (ref 3–14)
BASOPHILS # BLD AUTO: 0 10E9/L (ref 0–0.2)
BASOPHILS NFR BLD AUTO: 0.2 %
BUN SERPL-MCNC: 22 MG/DL (ref 7–30)
CALCIUM SERPL-MCNC: 8.2 MG/DL (ref 8.5–10.1)
CHLORIDE SERPL-SCNC: 106 MMOL/L (ref 94–109)
CO2 SERPL-SCNC: 30 MMOL/L (ref 20–32)
CREAT SERPL-MCNC: 0.8 MG/DL (ref 0.52–1.04)
DIFFERENTIAL METHOD BLD: ABNORMAL
EOSINOPHIL # BLD AUTO: 0 10E9/L (ref 0–0.7)
EOSINOPHIL NFR BLD AUTO: 0 %
ERYTHROCYTE [DISTWIDTH] IN BLOOD BY AUTOMATED COUNT: 18.3 % (ref 10–15)
GFR SERPL CREATININE-BSD FRML MDRD: 73 ML/MIN/1.7M2
GLUCOSE SERPL-MCNC: 123 MG/DL (ref 70–99)
HCT VFR BLD AUTO: 29 % (ref 35–47)
HGB BLD-MCNC: 9 G/DL (ref 11.7–15.7)
IMM GRANULOCYTES # BLD: 0 10E9/L (ref 0–0.4)
IMM GRANULOCYTES NFR BLD: 0 %
LYMPHOCYTES # BLD AUTO: 0.3 10E9/L (ref 0.8–5.3)
LYMPHOCYTES NFR BLD AUTO: 5 %
MCH RBC QN AUTO: 31.8 PG (ref 26.5–33)
MCHC RBC AUTO-ENTMCNC: 31 G/DL (ref 31.5–36.5)
MCV RBC AUTO: 103 FL (ref 78–100)
MONOCYTES # BLD AUTO: 0.4 10E9/L (ref 0–1.3)
MONOCYTES NFR BLD AUTO: 5.8 %
NEUTROPHILS # BLD AUTO: 5.8 10E9/L (ref 1.6–8.3)
NEUTROPHILS NFR BLD AUTO: 89 %
NRBC # BLD AUTO: 0 10*3/UL
NRBC BLD AUTO-RTO: 0 /100
PLATELET # BLD AUTO: 268 10E9/L (ref 150–450)
POTASSIUM SERPL-SCNC: 3.8 MMOL/L (ref 3.4–5.3)
RBC # BLD AUTO: 2.83 10E12/L (ref 3.8–5.2)
SODIUM SERPL-SCNC: 144 MMOL/L (ref 133–144)
WBC # BLD AUTO: 6.5 10E9/L (ref 4–11)

## 2018-01-21 PROCEDURE — 85025 COMPLETE CBC W/AUTO DIFF WBC: CPT | Performed by: PHYSICIAN ASSISTANT

## 2018-01-21 PROCEDURE — 12000008 ZZH R&B INTERMEDIATE UMMC

## 2018-01-21 PROCEDURE — 25000128 H RX IP 250 OP 636: Performed by: PHYSICIAN ASSISTANT

## 2018-01-21 PROCEDURE — 25000125 ZZHC RX 250: Performed by: PHYSICIAN ASSISTANT

## 2018-01-21 PROCEDURE — 25000131 ZZH RX MED GY IP 250 OP 636 PS 637: Performed by: PHYSICIAN ASSISTANT

## 2018-01-21 PROCEDURE — 25000132 ZZH RX MED GY IP 250 OP 250 PS 637: Performed by: PHYSICIAN ASSISTANT

## 2018-01-21 PROCEDURE — 80048 BASIC METABOLIC PNL TOTAL CA: CPT | Performed by: PHYSICIAN ASSISTANT

## 2018-01-21 PROCEDURE — 36592 COLLECT BLOOD FROM PICC: CPT | Performed by: PHYSICIAN ASSISTANT

## 2018-01-21 RX ORDER — POTASSIUM CHLORIDE 750 MG/1
20 TABLET, EXTENDED RELEASE ORAL ONCE
Status: COMPLETED | OUTPATIENT
Start: 2018-01-21 | End: 2018-01-21

## 2018-01-21 RX ORDER — FUROSEMIDE 10 MG/ML
40 INJECTION INTRAMUSCULAR; INTRAVENOUS ONCE
Status: COMPLETED | OUTPATIENT
Start: 2018-01-21 | End: 2018-01-21

## 2018-01-21 RX ORDER — POLYETHYLENE GLYCOL 3350 17 G/17G
17 POWDER, FOR SOLUTION ORAL 2 TIMES DAILY
Status: DISCONTINUED | OUTPATIENT
Start: 2018-01-21 | End: 2018-01-22 | Stop reason: HOSPADM

## 2018-01-21 RX ADMIN — DICLOFENAC SODIUM 2 G: 10 GEL TOPICAL at 20:58

## 2018-01-21 RX ADMIN — CROMOLYN SODIUM 1 DROP: 40 SOLUTION/ DROPS OPHTHALMIC at 15:51

## 2018-01-21 RX ADMIN — DIAZEPAM 5 MG: 5 TABLET ORAL at 08:17

## 2018-01-21 RX ADMIN — CETIRIZINE HYDROCHLORIDE 10 MG: 10 TABLET, FILM COATED ORAL at 14:49

## 2018-01-21 RX ADMIN — BACITRACIN: 500 OINTMENT TOPICAL at 08:17

## 2018-01-21 RX ADMIN — ETOPOSIDE 145 MG: 20 INJECTION, SOLUTION, CONCENTRATE INTRAVENOUS at 18:08

## 2018-01-21 RX ADMIN — RANITIDINE 75 MG: 75 TABLET, FILM COATED ORAL at 20:58

## 2018-01-21 RX ADMIN — PREDNISONE 60 MG: 50 TABLET ORAL at 17:47

## 2018-01-21 RX ADMIN — ACYCLOVIR 400 MG: 400 TABLET ORAL at 08:16

## 2018-01-21 RX ADMIN — FUROSEMIDE 40 MG: 10 INJECTION, SOLUTION INTRAVENOUS at 10:39

## 2018-01-21 RX ADMIN — Medication 2 TABLET: at 08:16

## 2018-01-21 RX ADMIN — OXYCODONE HYDROCHLORIDE 30 MG: 30 TABLET ORAL at 08:16

## 2018-01-21 RX ADMIN — RANITIDINE 75 MG: 75 TABLET, FILM COATED ORAL at 14:49

## 2018-01-21 RX ADMIN — SENNOSIDES AND DOCUSATE SODIUM 2 TABLET: 8.6; 5 TABLET ORAL at 08:17

## 2018-01-21 RX ADMIN — FUROSEMIDE 20 MG: 20 TABLET ORAL at 15:50

## 2018-01-21 RX ADMIN — POTASSIUM CHLORIDE 20 MEQ: 750 TABLET, EXTENDED RELEASE ORAL at 10:38

## 2018-01-21 RX ADMIN — DIAZEPAM 5 MG: 5 TABLET ORAL at 20:58

## 2018-01-21 RX ADMIN — HYDROCHLOROTHIAZIDE 12.5 MG: 12.5 CAPSULE ORAL at 08:18

## 2018-01-21 RX ADMIN — CELECOXIB 200 MG: 200 CAPSULE ORAL at 19:52

## 2018-01-21 RX ADMIN — DICLOFENAC SODIUM 2 G: 10 GEL TOPICAL at 08:18

## 2018-01-21 RX ADMIN — BACITRACIN: 500 OINTMENT TOPICAL at 21:02

## 2018-01-21 RX ADMIN — POLYETHYLENE GLYCOL 3350 17 G: 17 POWDER, FOR SOLUTION ORAL at 10:38

## 2018-01-21 RX ADMIN — PREDNISONE 60 MG: 50 TABLET ORAL at 08:16

## 2018-01-21 RX ADMIN — METHOCARBAMOL 750 MG: 750 TABLET ORAL at 19:52

## 2018-01-21 RX ADMIN — CYCLOBENZAPRINE HYDROCHLORIDE 10 MG: 10 TABLET, FILM COATED ORAL at 19:52

## 2018-01-21 RX ADMIN — METHOCARBAMOL 750 MG: 750 TABLET ORAL at 15:50

## 2018-01-21 RX ADMIN — OXYCODONE HYDROCHLORIDE 30 MG: 30 TABLET ORAL at 12:41

## 2018-01-21 RX ADMIN — MORPHINE SULFATE 30 MG: 30 TABLET, EXTENDED RELEASE ORAL at 06:22

## 2018-01-21 RX ADMIN — CROMOLYN SODIUM 1 ML: 5.2 SPRAY, METERED NASAL at 08:18

## 2018-01-21 RX ADMIN — ALLOPURINOL 300 MG: 300 TABLET ORAL at 08:16

## 2018-01-21 RX ADMIN — DIAZEPAM 5 MG: 5 TABLET ORAL at 14:49

## 2018-01-21 RX ADMIN — CROMOLYN SODIUM 1 DROP: 40 SOLUTION/ DROPS OPHTHALMIC at 08:17

## 2018-01-21 RX ADMIN — SENNOSIDES AND DOCUSATE SODIUM 2 TABLET: 8.6; 5 TABLET ORAL at 19:52

## 2018-01-21 RX ADMIN — RANITIDINE 75 MG: 75 TABLET, FILM COATED ORAL at 08:15

## 2018-01-21 RX ADMIN — CETIRIZINE HYDROCHLORIDE 10 MG: 10 TABLET, FILM COATED ORAL at 08:16

## 2018-01-21 RX ADMIN — FUROSEMIDE 20 MG: 20 TABLET ORAL at 08:16

## 2018-01-21 RX ADMIN — Medication 2 TABLET: at 19:51

## 2018-01-21 RX ADMIN — MONTELUKAST SODIUM 20 MG: 10 TABLET, COATED ORAL at 08:17

## 2018-01-21 RX ADMIN — ACYCLOVIR 400 MG: 400 TABLET ORAL at 20:58

## 2018-01-21 RX ADMIN — CROMOLYN SODIUM 1 DROP: 40 SOLUTION/ DROPS OPHTHALMIC at 21:00

## 2018-01-21 RX ADMIN — CYCLOBENZAPRINE HYDROCHLORIDE 10 MG: 10 TABLET, FILM COATED ORAL at 08:16

## 2018-01-21 RX ADMIN — MORPHINE SULFATE 60 MG: 60 TABLET, EXTENDED RELEASE ORAL at 19:51

## 2018-01-21 RX ADMIN — POTASSIUM CHLORIDE 20 MEQ: 750 TABLET, EXTENDED RELEASE ORAL at 08:16

## 2018-01-21 RX ADMIN — OXYCODONE HYDROCHLORIDE 30 MG: 30 TABLET ORAL at 15:50

## 2018-01-21 RX ADMIN — POLYETHYLENE GLYCOL 3350 17 G: 17 POWDER, FOR SOLUTION ORAL at 19:51

## 2018-01-21 RX ADMIN — OXYCODONE HYDROCHLORIDE 30 MG: 30 TABLET ORAL at 00:40

## 2018-01-21 RX ADMIN — CALCITRIOL 0.25 MCG: 0.25 CAPSULE, LIQUID FILLED ORAL at 19:51

## 2018-01-21 RX ADMIN — METHOCARBAMOL 750 MG: 750 TABLET ORAL at 12:42

## 2018-01-21 RX ADMIN — ONDANSETRON HYDROCHLORIDE 16 MG: 8 TABLET, FILM COATED ORAL at 17:47

## 2018-01-21 RX ADMIN — CROMOLYN SODIUM 1 DROP: 40 SOLUTION/ DROPS OPHTHALMIC at 12:42

## 2018-01-21 RX ADMIN — LEVOTHYROXINE SODIUM 224 MCG: 112 TABLET ORAL at 08:15

## 2018-01-21 RX ADMIN — MONTELUKAST SODIUM 20 MG: 10 TABLET, COATED ORAL at 19:52

## 2018-01-21 RX ADMIN — OXYCODONE HYDROCHLORIDE 30 MG: 30 TABLET ORAL at 03:49

## 2018-01-21 RX ADMIN — VINCRISTINE SULFATE 0.8 MG: 1 INJECTION, SOLUTION INTRAVENOUS at 18:33

## 2018-01-21 RX ADMIN — CALCITRIOL 0.5 MCG: 0.5 CAPSULE, LIQUID FILLED ORAL at 08:16

## 2018-01-21 RX ADMIN — CHOLECALCIFEROL CAP 125 MCG (5000 UNIT) 5000 UNITS: 125 CAP at 19:52

## 2018-01-21 RX ADMIN — CYCLOBENZAPRINE HYDROCHLORIDE 10 MG: 10 TABLET, FILM COATED ORAL at 14:49

## 2018-01-21 RX ADMIN — METHOCARBAMOL 750 MG: 750 TABLET ORAL at 08:16

## 2018-01-21 RX ADMIN — OXYCODONE HYDROCHLORIDE 30 MG: 30 TABLET ORAL at 20:58

## 2018-01-21 RX ADMIN — CELECOXIB 200 MG: 200 CAPSULE ORAL at 08:16

## 2018-01-21 RX ADMIN — MORPHINE SULFATE 30 MG: 30 TABLET, EXTENDED RELEASE ORAL at 12:42

## 2018-01-21 RX ADMIN — CETIRIZINE HYDROCHLORIDE 10 MG: 10 TABLET, FILM COATED ORAL at 22:38

## 2018-01-21 ASSESSMENT — PAIN DESCRIPTION - DESCRIPTORS
DESCRIPTORS: ACHING
DESCRIPTORS: ACHING;CONSTANT

## 2018-01-21 NOTE — PROGRESS NOTES
Nursing Focus: Chemotherapy  D: Positive blood return via PICC. Insertion site is clean/dry/intact, dressing intact with no complaints of pain.  Urine output is recorded in intake in Doc Flowsheet.    I: Premedications given per order (see electronic medical administration record). Dose #3 of Vincrinstine/Doxorubicin/Etoposide started to infuse over 23 hours. Reviewed pt teaching on chemotherapy side effects.  Pt denies need for further teaching. Chemotherapy double checked per protocol by two chemotherapy competent RN's.   A: Tolerating procedure well. Denies nausea and or pain.   P: Continue to monitor urine output and symptoms of nausea. Screen for symptoms of toxicity.

## 2018-01-21 NOTE — PLAN OF CARE
Problem: Chemotherapy Effects (Adult)  Goal: Signs and Symptoms of Listed Potential Problems Will be Absent, Minimized or Managed (Chemotherapy Effects)  Signs and symptoms of listed potential problems will be absent, minimized or managed by discharge/transition of care (reference Chemotherapy Effects (Adult) CPG).   Outcome: No Change  Doing well. Denies nausea. Chest pain controlled by scheduled pain medications. Will discharge tomorrow evening after chemotherapy is done. Given an additional dose of IV Lasix with a good response.

## 2018-01-21 NOTE — PROGRESS NOTES
"[facility] [place]    Hematology / Oncology Progress Note    Date of Service (when I saw the patient): 01/21/2018     Assessment & Plan   Tisha Arias is a 57 year old female with high grade DLBCL and PMHx significant for breast cancer s/p bilateral mastectomies & BENJIE/BSO, papillary thyroid cancer s/p total thyroidectomy, chronic chest wall pain, muscle spasms, asthma, MCAS and h/o Rachael en Y gastric bypass, who is admitted for Cycle 6 of DA-R-EPOCH.       Problem List:    HEME/ONC  # High grade B cell lymphoma. \"Double hit-like\". Primary is Dr. Sauceda. Patient diagnosed August 2017. Lytic lesion of right 10th rib with extension. Disease localized above the diaphragm in thoracic and paravertebral soft tissue and mediastinal lymphadenopathy. 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 Staging LP and BMBx were negative for lymphoma. Initiated cycle 1 DA-R-EPOCH on 10/6/17. Has completed 5 cycles. She now presents for cycle 6 DA-R-EPOCH, appropriate for 20% dose increase of cytoxan, etoposide and doxorubicin per protocol. Had PET showing CR on 12/21/17.      REGIMEN: Cycle 6 DA-R-EPOCH (Day 1=1/18/18); Today is D4; tolerating chemo well   Rituxan 750 mg D1 (completed)  Prednisone 60 mg bid D1-5  Etoposide 145 mg CIVI D1-4  Vincristine 0.8 mg CIVI D1-4  Doxorubicin 29 mg CIVI D1-4  Cytoxan 2170 mg D5  IT MTX D2- Done on 1/19; flow pending  Premeds: Tylenol, benadryl, zofran 16 mg D1-5, Emend 150 mg D5  Dex 8 mg D6 and 7  Neulasta D6      #Anemia. 2/2 chemotherapy. Do not anticipate any transfusion needs during this hospitalization  - Hgb >7 and plt >10k       #Stage 1, ER/NH-positive, HER2-negative breast cancer. Follows with Dr. Crawley. Diagnosed in 2011. Oncotype recurrence score of 11 (7% recurrence risk after 5 years of tamoxifen). S/p bilateral " mastectomies and BENJIE/BSO in 2012. Was initially on Arimidex (5430-2871) but changed to Tamoxifen due to arthralgias. Tamoxifen held since 10/5 and continue to hold with chemotherapy. Last f/u Dr. Crawley was on 11/27/17.      # Papillary thyroid cancer. Follows with Dr. Castro. Diagnosed in 2013. S/p total thyroidectomy and CND. Received ETOH treatment August 2014, October 2014 and December 2014. Has post-surgical hypothyroidism.  -Continue PTA Levothyroxine.  -Continue PTA calcitriol.      GI  # H/o Rachael en Y gastric bypass. Likely affecting absorption, thus patient is on multiple supplements.  - Continue supplements (calcium citrate-vitamin D, vitamin D3, magnesium citrate). Also has iron deficiency anemia likely from poor absorption, continue 325mg PO ferrous sulfate TID (resume outpatient).       Pain  # Chronic chest wall pain after mastectomy.   -Continue MS Contin 30/30/60 mg TID.  -Oxycodone 15-30 mg q4hrs.  -Celebrex 200 BID.   -Lidocaine cream prn.      #Chronic muscle cramps.   - Robaxin 750 mg QID, Valium 5 mg TID, Flexeril tid.      ID  #Anti-infectives.  - Continue PTA acyclovir  - Held Levaquin and fluconazole ppx as patient is not neutropenic.   - Resumed on discharge or when neutropenic      CV  #Lymphedema of lower extremities.  - On HCTZ & lasix PTA   - IV lasix 40 mg IV on 1/20 with extra 20 meq potassium and potassium recheck later in the day with good UOP; still weight up and with LE edema. Repeat IV lasix 40 mg IV today.       PULM  #Asthma, allergies.   -Continue PTA singular, zyrtec.      FEN  -IVF per chemo regimen.  -PTA electrolyte replacements and lyte protocol.  -RDAT.      PPx  -VTE: lovenox held prior to LP  -PUD: PTA ranitidine.  -Bowels: PTA docusate and miralax.       FULL CODE      DISPO: After completion of chemo, anticipate total of 5 day hospitalization, will d/c tomorrow (1/22) in the evening  Follow up: Has f/u scheduled in oncology clinic with SANJANA, labs and Neulasta on Wed  1/24      Kady Kaufman PA-C  Hematology/Oncology  Pager: [phone]    Interval History   States she is feeling well. No new complaints. Tired and still having swelling in her legs bilaterally. Some improvement after lasix yesterday with weight down a little but still almost 10 lbs up from baseline. No SOB or new chest pain or cough. Eating ok. Still constipated with small BM this morning, will take miralax bid today.     Physical Exam   Temp: 97.9  F (36.6  C) Temp src: Oral BP: 121/64 Pulse: 77 Heart Rate: 78 Resp: 18 SpO2: 92 % O2 Device: None (Room air)    Vitals:    01/19/18 0744 01/20/18 0730 01/20/18 0735   Weight: 88.4 kg (194 lb 14.4 oz) 87.8 kg (193 lb 9.6 oz) 89 kg (196 lb 4.8 oz)     Vital Signs with Ranges  Temp:  [96  F (35.6  C)-98.7  F (37.1  C)] 97.9  F (36.6  C)  Pulse:  [77-87] 77  Heart Rate:  [] 78  Resp:  [18] 18  BP: (109-123)/(50-69) 121/64  SpO2:  [92 %-98 %] 92 %  I/O last 3 completed shifts:  In: 3356 [P.O.:2631; I.V.:50; IV Piggyback:675]  Out: 6578 [Urine:6578]    Constitutional: Well appearing, no acute distress  Eyes:  sclera anicteric, conjunctiva clear  HEENT: normocephalic, atraumatic, alopecia, MMM, no mucositis or thrush  Respiratory: clear to auscultation bilaterally  Cardiovascular: RRR, S1S2, no m/r/g  GI: soft, nondistended, nontender  Skin: no visible rashes, port in right chest is c/d/i  Musculoskeletal: +2 b/l LE edema  Neurologic: A&O x 4, no focal deficits, normal gait  Psychiatric: appropriate affect       Medications     - MEDICATION INSTRUCTIONS -       NaCl         bacitracin   Topical BID     acyclovir  400 mg Oral BID     celecoxib  200 mg Oral BID     heparin lock flush  5-10 mL Intracatheter Q24H     heparin  5 mL Intracatheter Q28 Days     calcitRIOL  0.5 mcg Oral Daily     calcitRIOL  0.25 mcg Oral QPM     calcium citrate-vitamin D  2 tablet Oral BID     cetirizine  10 mg Oral TID     cholecalciferol  5,000 Units Oral Daily     cromolyn  1 drop Both  Eyes 4x Daily     cromolyn sodium  1 spray Nasal Daily     diazepam  5 mg Oral TID     diclofenac  2 g Topical BID     furosemide  20 mg Oral BID     hydrochlorothiazide  12.5 mg Oral Daily     levothyroxine  224 mcg Oral QAM AC     methocarbamol  750 mg Oral 4x Daily     montelukast  20 mg Oral BID     morphine  30 mg Oral BID     morphine  60 mg Oral At Bedtime     potassium chloride SA  20 mEq Oral Daily     ranitidine  75 mg Oral TID     cyclobenzaprine  10 mg Oral TID     oxyCODONE IR  30 mg Oral Q4H     ondansetron  16 mg Oral Q23H     [START ON 1/22/2018] fosaprepitant (EMEND) 150 mg intermittent infusion  150 mg Intravenous Once     [START ON 1/24/2018] dexamethasone  8 mg Oral Daily     predniSONE  30 mg/m2 (Treatment Plan Recorded) Oral BID     Chemotherapy Infusing-Continuous Infusion   Does not apply Q8H     etoposide (TOPOSAR) chemo infusion  72 mg/m2 (Treatment Plan Recorded) Intravenous Q23H     vinCRIStine/DOXOrubicin (ONCOVIN/ADRIAMYCIN) chemo infusion  0.4 mg/m2 (Treatment Plan Recorded) Intravenous Q23H     [START ON 1/22/2018] cyclophosphamide (CYTOXAN) chemo infusion  1,080 mg/m2 (Treatment Plan Recorded) Intravenous Once     [START ON 1/23/2018] filgrastim (NEUPOGEN/GRANIX) intravenous  5 mcg/kg (Treatment Plan Recorded) Intravenous Daily at 8 pm     senna-docusate  2 tablet Oral BID     allopurinol  300 mg Oral Daily       Data   Results for orders placed or performed during the hospital encounter of 01/18/18 (from the past 24 hour(s))   Potassium   Result Value Ref Range    Potassium 4.1 3.4 - 5.3 mmol/L   CBC with platelets differential   Result Value Ref Range    WBC 6.5 4.0 - 11.0 10e9/L    RBC Count 2.83 (L) 3.8 - 5.2 10e12/L    Hemoglobin 9.0 (L) 11.7 - 15.7 g/dL    Hematocrit 29.0 (L) 35.0 - 47.0 %     (H) 78 - 100 fl    MCH 31.8 26.5 - 33.0 pg    MCHC 31.0 (L) 31.5 - 36.5 g/dL    RDW 18.3 (H) 10.0 - 15.0 %    Platelet Count 268 150 - 450 10e9/L    Diff Method Automated Method      % Neutrophils 89.0 %    % Lymphocytes 5.0 %    % Monocytes 5.8 %    % Eosinophils 0.0 %    % Basophils 0.2 %    % Immature Granulocytes 0.0 %    Nucleated RBCs 0 0 /100    Absolute Neutrophil 5.8 1.6 - 8.3 10e9/L    Absolute Lymphocytes 0.3 (L) 0.8 - 5.3 10e9/L    Absolute Monocytes 0.4 0.0 - 1.3 10e9/L    Absolute Eosinophils 0.0 0.0 - 0.7 10e9/L    Absolute Basophils 0.0 0.0 - 0.2 10e9/L    Abs Immature Granulocytes 0.0 0 - 0.4 10e9/L    Absolute Nucleated RBC 0.0      *Note: Due to a large number of results and/or encounters for the requested time period, some results have not been displayed. A complete set of results can be found in Results Review.

## 2018-01-21 NOTE — PLAN OF CARE
Problem: Patient Care Overview  Goal: Plan of Care/Patient Progress Review    VSS. Afebrile. CIVI chemo day 3 hung @ 2044. Bilateral LE edema. Furosemide given as scheduled. Chest and back pain controlled with scheduled pain meds. Voiding good amounts of clear urine. Small bm today. Senna given in addition to requested Miralax. Denies N/V. Bacitracin and small dressing placed on abd abrasion. Continue POC.

## 2018-01-21 NOTE — PLAN OF CARE
Problem: Patient Care Overview  Goal: Plan of Care/Patient Progress Review  Outcome: No Change  00:00-07:00 am  AF VSS  CIVI chemo day 3 infusing  Tolerating chemo well thus far  No N/V  Chest and back pain well controlled with current pain regimen  Pt is slightly lethargic possible from pain med but aroused easily  Voiding good UOP  No BM reported at night   Slept well and no acute issues overnight  Continue w/POC

## 2018-01-22 VITALS
RESPIRATION RATE: 20 BRPM | BODY MASS INDEX: 32.03 KG/M2 | WEIGHT: 192.24 LBS | HEART RATE: 87 BPM | HEIGHT: 65 IN | DIASTOLIC BLOOD PRESSURE: 65 MMHG | TEMPERATURE: 97.6 F | SYSTOLIC BLOOD PRESSURE: 108 MMHG | OXYGEN SATURATION: 96 %

## 2018-01-22 LAB
ANION GAP SERPL CALCULATED.3IONS-SCNC: 8 MMOL/L (ref 3–14)
APPEARANCE CSF: CLEAR
BASOPHILS # BLD AUTO: 0 10E9/L (ref 0–0.2)
BASOPHILS NFR BLD AUTO: 0 %
BUN SERPL-MCNC: 24 MG/DL (ref 7–30)
CALCIUM SERPL-MCNC: 7.9 MG/DL (ref 8.5–10.1)
CHLORIDE SERPL-SCNC: 102 MMOL/L (ref 94–109)
CO2 SERPL-SCNC: 31 MMOL/L (ref 20–32)
COLOR CSF: COLORLESS
COPATH REPORT: NORMAL
CREAT SERPL-MCNC: 0.72 MG/DL (ref 0.52–1.04)
DIFFERENTIAL METHOD BLD: ABNORMAL
EOSINOPHIL # BLD AUTO: 0 10E9/L (ref 0–0.7)
EOSINOPHIL NFR BLD AUTO: 0 %
ERYTHROCYTE [DISTWIDTH] IN BLOOD BY AUTOMATED COUNT: 17.7 % (ref 10–15)
GFR SERPL CREATININE-BSD FRML MDRD: 83 ML/MIN/1.7M2
GLUCOSE SERPL-MCNC: 119 MG/DL (ref 70–99)
HCT VFR BLD AUTO: 27.2 % (ref 35–47)
HGB BLD-MCNC: 8.8 G/DL (ref 11.7–15.7)
IMM GRANULOCYTES # BLD: 0 10E9/L (ref 0–0.4)
IMM GRANULOCYTES NFR BLD: 0.2 %
LYMPHOCYTES # BLD AUTO: 0.3 10E9/L (ref 0.8–5.3)
LYMPHOCYTES NFR BLD AUTO: 5.7 %
MCH RBC QN AUTO: 32.7 PG (ref 26.5–33)
MCHC RBC AUTO-ENTMCNC: 32.4 G/DL (ref 31.5–36.5)
MCV RBC AUTO: 101 FL (ref 78–100)
MONOCYTES # BLD AUTO: 0.1 10E9/L (ref 0–1.3)
MONOCYTES NFR BLD AUTO: 2.7 %
NEUTROPHILS # BLD AUTO: 4.3 10E9/L (ref 1.6–8.3)
NEUTROPHILS NFR BLD AUTO: 91.4 %
NRBC # BLD AUTO: 0 10*3/UL
NRBC BLD AUTO-RTO: 0 /100
PLATELET # BLD AUTO: 232 10E9/L (ref 150–450)
POTASSIUM SERPL-SCNC: 3.2 MMOL/L (ref 3.4–5.3)
POTASSIUM SERPL-SCNC: 4 MMOL/L (ref 3.4–5.3)
RBC # BLD AUTO: 2.69 10E12/L (ref 3.8–5.2)
RBC # CSF MANUAL: 165 /UL (ref 0–2)
SODIUM SERPL-SCNC: 142 MMOL/L (ref 133–144)
TUBE # CSF: 4 #
WBC # BLD AUTO: 4.8 10E9/L (ref 4–11)
WBC # CSF MANUAL: 2 /UL (ref 0–5)

## 2018-01-22 PROCEDURE — 25000128 H RX IP 250 OP 636: Performed by: PHYSICIAN ASSISTANT

## 2018-01-22 PROCEDURE — 25000128 H RX IP 250 OP 636: Performed by: NURSE PRACTITIONER

## 2018-01-22 PROCEDURE — 25000131 ZZH RX MED GY IP 250 OP 636 PS 637: Performed by: PHYSICIAN ASSISTANT

## 2018-01-22 PROCEDURE — 36592 COLLECT BLOOD FROM PICC: CPT | Performed by: INTERNAL MEDICINE

## 2018-01-22 PROCEDURE — 85025 COMPLETE CBC W/AUTO DIFF WBC: CPT | Performed by: PHYSICIAN ASSISTANT

## 2018-01-22 PROCEDURE — 36592 COLLECT BLOOD FROM PICC: CPT | Performed by: PHYSICIAN ASSISTANT

## 2018-01-22 PROCEDURE — 25000125 ZZHC RX 250: Performed by: PHYSICIAN ASSISTANT

## 2018-01-22 PROCEDURE — 25000132 ZZH RX MED GY IP 250 OP 250 PS 637: Performed by: PHYSICIAN ASSISTANT

## 2018-01-22 PROCEDURE — 84132 ASSAY OF SERUM POTASSIUM: CPT | Performed by: INTERNAL MEDICINE

## 2018-01-22 PROCEDURE — 80048 BASIC METABOLIC PNL TOTAL CA: CPT | Performed by: PHYSICIAN ASSISTANT

## 2018-01-22 RX ORDER — POTASSIUM CHLORIDE 1500 MG/1
20 TABLET, EXTENDED RELEASE ORAL 2 TIMES DAILY
Qty: 30 TABLET | Refills: 0 | Status: SHIPPED | OUTPATIENT
Start: 2018-01-22 | End: 2018-02-16

## 2018-01-22 RX ORDER — ACETAMINOPHEN 160 MG
5000 TABLET,DISINTEGRATING ORAL DAILY
Qty: 60 CAPSULE | Refills: 0 | Status: SHIPPED | OUTPATIENT
Start: 2018-01-22 | End: 2018-11-26

## 2018-01-22 RX ORDER — ACYCLOVIR 400 MG/1
400 TABLET ORAL 2 TIMES DAILY
Qty: 60 TABLET | Refills: 0 | Status: SHIPPED | OUTPATIENT
Start: 2018-01-22 | End: 2018-02-16

## 2018-01-22 RX ORDER — LEVOFLOXACIN 250 MG/1
250 TABLET, FILM COATED ORAL DAILY
Qty: 30 TABLET | Refills: 0 | Status: SHIPPED | OUTPATIENT
Start: 2018-01-22 | End: 2018-02-16

## 2018-01-22 RX ORDER — METHOCARBAMOL 750 MG/1
750 TABLET, FILM COATED ORAL 4 TIMES DAILY PRN
Qty: 360 TABLET | Refills: 1 | Status: SHIPPED | OUTPATIENT
Start: 2018-01-22 | End: 2018-01-24

## 2018-01-22 RX ORDER — ONDANSETRON 8 MG/1
8 TABLET, ORALLY DISINTEGRATING ORAL EVERY 8 HOURS PRN
Qty: 30 TABLET | Refills: 1 | Status: SHIPPED | OUTPATIENT
Start: 2018-01-22 | End: 2018-08-29

## 2018-01-22 RX ORDER — OXYCODONE HYDROCHLORIDE 30 MG/1
30 TABLET ORAL EVERY 4 HOURS PRN
Qty: 30 TABLET | Refills: 0 | Status: SHIPPED | OUTPATIENT
Start: 2018-01-27 | End: 2018-02-21

## 2018-01-22 RX ORDER — HYDROCHLOROTHIAZIDE 12.5 MG/1
12.5 CAPSULE ORAL DAILY
Qty: 30 CAPSULE | Refills: 0 | Status: SHIPPED | OUTPATIENT
Start: 2018-01-22 | End: 2018-11-26

## 2018-01-22 RX ORDER — FUROSEMIDE 20 MG
20 TABLET ORAL 2 TIMES DAILY
Qty: 60 TABLET | Refills: 0 | Status: SHIPPED | OUTPATIENT
Start: 2018-01-22 | End: 2018-11-26

## 2018-01-22 RX ORDER — DEXAMETHASONE 4 MG/1
8 TABLET ORAL DAILY
Qty: 4 TABLET | Refills: 0 | Status: SHIPPED | OUTPATIENT
Start: 2018-01-23 | End: 2018-02-16

## 2018-01-22 RX ORDER — CYCLOBENZAPRINE HCL 10 MG
10 TABLET ORAL 3 TIMES DAILY PRN
Qty: 30 TABLET | Refills: 0 | Status: SHIPPED | OUTPATIENT
Start: 2018-01-22 | End: 2019-05-07

## 2018-01-22 RX ORDER — MONTELUKAST SODIUM 10 MG/1
20 TABLET ORAL 2 TIMES DAILY
Qty: 60 TABLET | Refills: 0 | Status: SHIPPED | OUTPATIENT
Start: 2018-01-22 | End: 2018-08-03

## 2018-01-22 RX ORDER — CALCITRIOL 0.25 UG/1
CAPSULE, LIQUID FILLED ORAL
Qty: 90 CAPSULE | Refills: 0 | Status: SHIPPED | OUTPATIENT
Start: 2018-01-22 | End: 2018-05-21

## 2018-01-22 RX ORDER — FLUCONAZOLE 200 MG/1
200 TABLET ORAL DAILY
Qty: 30 TABLET | Refills: 0 | Status: SHIPPED | OUTPATIENT
Start: 2018-01-22 | End: 2018-02-16

## 2018-01-22 RX ORDER — CETIRIZINE HYDROCHLORIDE 10 MG/1
10 TABLET ORAL 3 TIMES DAILY
Qty: 90 TABLET | Refills: 0 | Status: SHIPPED | OUTPATIENT
Start: 2018-01-22

## 2018-01-22 RX ORDER — CROMOLYN SODIUM 5.2 MG
AEROSOL, SPRAY WITH PUMP (ML) NASAL
Qty: 1 BOTTLE | Refills: 6 | Status: SHIPPED | OUTPATIENT
Start: 2018-01-22 | End: 2018-01-24

## 2018-01-22 RX ORDER — MONTELUKAST SODIUM 10 MG/1
10 TABLET ORAL 2 TIMES DAILY
Qty: 60 TABLET | Refills: 0 | Status: SHIPPED | OUTPATIENT
Start: 2018-01-22 | End: 2018-01-22

## 2018-01-22 RX ORDER — CROMOLYN SODIUM 40 MG/ML
1 SOLUTION/ DROPS OPHTHALMIC 4 TIMES DAILY
Qty: 10 ML | Refills: 0 | Status: SHIPPED | OUTPATIENT
Start: 2018-01-22 | End: 2022-04-18

## 2018-01-22 RX ORDER — HEPARIN SODIUM (PORCINE) LOCK FLUSH IV SOLN 100 UNIT/ML 100 UNIT/ML
5 SOLUTION INTRAVENOUS
Status: DISCONTINUED | OUTPATIENT
Start: 2018-01-22 | End: 2018-01-22 | Stop reason: HOSPADM

## 2018-01-22 RX ORDER — CELECOXIB 200 MG/1
200 CAPSULE ORAL 2 TIMES DAILY
Qty: 56 CAPSULE | Refills: 0 | Status: SHIPPED | OUTPATIENT
Start: 2018-01-22 | End: 2018-05-24

## 2018-01-22 RX ORDER — POTASSIUM CHLORIDE 1500 MG/1
20 TABLET, EXTENDED RELEASE ORAL DAILY
Qty: 30 TABLET | Refills: 0 | Status: SHIPPED | OUTPATIENT
Start: 2018-01-22 | End: 2018-01-22

## 2018-01-22 RX ADMIN — OXYCODONE HYDROCHLORIDE 30 MG: 30 TABLET ORAL at 00:32

## 2018-01-22 RX ADMIN — DICLOFENAC SODIUM 2 G: 10 GEL TOPICAL at 08:00

## 2018-01-22 RX ADMIN — OXYCODONE HYDROCHLORIDE 30 MG: 30 TABLET ORAL at 08:00

## 2018-01-22 RX ADMIN — SENNOSIDES AND DOCUSATE SODIUM 1 TABLET: 8.6; 5 TABLET ORAL at 07:59

## 2018-01-22 RX ADMIN — ACYCLOVIR 400 MG: 400 TABLET ORAL at 07:59

## 2018-01-22 RX ADMIN — METHOCARBAMOL 750 MG: 750 TABLET ORAL at 07:59

## 2018-01-22 RX ADMIN — CROMOLYN SODIUM 1 DROP: 40 SOLUTION/ DROPS OPHTHALMIC at 08:01

## 2018-01-22 RX ADMIN — PREDNISONE 60 MG: 50 TABLET ORAL at 17:24

## 2018-01-22 RX ADMIN — FUROSEMIDE 20 MG: 20 TABLET ORAL at 07:59

## 2018-01-22 RX ADMIN — OXYCODONE HYDROCHLORIDE 30 MG: 30 TABLET ORAL at 15:38

## 2018-01-22 RX ADMIN — RANITIDINE 75 MG: 75 TABLET, FILM COATED ORAL at 13:55

## 2018-01-22 RX ADMIN — OXYCODONE HYDROCHLORIDE 30 MG: 30 TABLET ORAL at 12:08

## 2018-01-22 RX ADMIN — DIAZEPAM 5 MG: 5 TABLET ORAL at 13:55

## 2018-01-22 RX ADMIN — MONTELUKAST SODIUM 20 MG: 10 TABLET, COATED ORAL at 08:00

## 2018-01-22 RX ADMIN — MORPHINE SULFATE 30 MG: 30 TABLET, EXTENDED RELEASE ORAL at 06:35

## 2018-01-22 RX ADMIN — BACITRACIN: 500 OINTMENT TOPICAL at 10:20

## 2018-01-22 RX ADMIN — CROMOLYN SODIUM 1 ML: 5.2 SPRAY, METERED NASAL at 08:01

## 2018-01-22 RX ADMIN — ALLOPURINOL 300 MG: 300 TABLET ORAL at 07:59

## 2018-01-22 RX ADMIN — LEVOTHYROXINE SODIUM 224 MCG: 112 TABLET ORAL at 07:59

## 2018-01-22 RX ADMIN — CYCLOBENZAPRINE HYDROCHLORIDE 10 MG: 10 TABLET, FILM COATED ORAL at 07:59

## 2018-01-22 RX ADMIN — POTASSIUM CHLORIDE 40 MEQ: 750 TABLET, EXTENDED RELEASE ORAL at 10:17

## 2018-01-22 RX ADMIN — POTASSIUM CHLORIDE 20 MEQ: 750 TABLET, EXTENDED RELEASE ORAL at 08:38

## 2018-01-22 RX ADMIN — PREDNISONE 60 MG: 50 TABLET ORAL at 07:59

## 2018-01-22 RX ADMIN — METHOCARBAMOL 750 MG: 750 TABLET ORAL at 15:38

## 2018-01-22 RX ADMIN — SODIUM CHLORIDE, PRESERVATIVE FREE 5 ML: 5 INJECTION INTRAVENOUS at 17:24

## 2018-01-22 RX ADMIN — CYCLOPHOSPHAMIDE 2170 MG: 2 INJECTION, POWDER, FOR SOLUTION INTRAVENOUS; ORAL at 16:11

## 2018-01-22 RX ADMIN — SODIUM CHLORIDE 150 MG: 9 INJECTION, SOLUTION INTRAVENOUS at 15:40

## 2018-01-22 RX ADMIN — METHOCARBAMOL 750 MG: 750 TABLET ORAL at 12:08

## 2018-01-22 RX ADMIN — Medication 2 TABLET: at 07:59

## 2018-01-22 RX ADMIN — CYCLOBENZAPRINE HYDROCHLORIDE 10 MG: 10 TABLET, FILM COATED ORAL at 13:54

## 2018-01-22 RX ADMIN — DIAZEPAM 5 MG: 5 TABLET ORAL at 07:59

## 2018-01-22 RX ADMIN — FUROSEMIDE 20 MG: 20 TABLET ORAL at 15:38

## 2018-01-22 RX ADMIN — POLYETHYLENE GLYCOL 3350 17 G: 17 POWDER, FOR SOLUTION ORAL at 08:00

## 2018-01-22 RX ADMIN — CETIRIZINE HYDROCHLORIDE 10 MG: 10 TABLET, FILM COATED ORAL at 07:59

## 2018-01-22 RX ADMIN — CETIRIZINE HYDROCHLORIDE 10 MG: 10 TABLET, FILM COATED ORAL at 13:55

## 2018-01-22 RX ADMIN — MORPHINE SULFATE 30 MG: 30 TABLET, EXTENDED RELEASE ORAL at 12:08

## 2018-01-22 RX ADMIN — POTASSIUM CHLORIDE 20 MEQ: 750 TABLET, EXTENDED RELEASE ORAL at 08:00

## 2018-01-22 RX ADMIN — ONDANSETRON HYDROCHLORIDE 16 MG: 8 TABLET, FILM COATED ORAL at 15:39

## 2018-01-22 RX ADMIN — RANITIDINE 75 MG: 75 TABLET, FILM COATED ORAL at 08:00

## 2018-01-22 RX ADMIN — CALCITRIOL 0.5 MCG: 0.5 CAPSULE, LIQUID FILLED ORAL at 07:59

## 2018-01-22 RX ADMIN — CELECOXIB 200 MG: 200 CAPSULE ORAL at 07:59

## 2018-01-22 RX ADMIN — OXYCODONE HYDROCHLORIDE 30 MG: 30 TABLET ORAL at 04:17

## 2018-01-22 RX ADMIN — HYDROCHLOROTHIAZIDE 12.5 MG: 12.5 CAPSULE ORAL at 07:59

## 2018-01-22 ASSESSMENT — PAIN DESCRIPTION - DESCRIPTORS: DESCRIPTORS: ACHING

## 2018-01-22 NOTE — PLAN OF CARE
Problem: Chemotherapy Effects (Adult)  Goal: Signs and Symptoms of Listed Potential Problems Will be Absent, Minimized or Managed (Chemotherapy Effects)  Signs and symptoms of listed potential problems will be absent, minimized or managed by discharge/transition of care (reference Chemotherapy Effects (Adult) CPG).   Outcome: No Change  D: Day 4/4 Etoposide, Vincristine with Doxorubicin via IVAD.  IVAD with brisk blood return at each check.  Denies nausea on scheduled antiemetics.      I:   Etoposide, Vincristine with Doxorubicin IV over 23 hrs as ordered.    A: Tolerating chemo well at present.  P: Continue treatment as ordered.  Pt will notify nursing if with breakthrough nausea.  Plan Cytoxan tomorrow evening at completion of Etoposide, Vincristine with Doxorubicin infusion then discharge.

## 2018-01-22 NOTE — PLAN OF CARE
Problem: Patient Care Overview  Goal: Plan of Care/Patient Progress Review  Outcome: No Change  00: am  AF VSS  CIVI day 4 chemo infusing  Tolerating chemo well  No N/V  Chest wall pain and lower back pain well controlled with current pain regimen  Although pt continues to be somewhat drowsy from multiple pain meds but aroused easily and oriented  Up independently  Voiding well  Good UOP with 1 formed BM   No acute issues overnight  Due for Cytoxan today and D/C after chemo completion  Continue w/POC

## 2018-01-22 NOTE — PROGRESS NOTES
SPIRITUAL HEALTH SERVICES  SPIRITUAL ASSESSMENT Progress Note  [facility] 7D     REFERRAL SOURCE: Follow Up Visit    Elvin states she made it through the weekend and is hoping to discharge tonight. She has a new grand baby that was born on Friday and is looking forward to meeting her. We continued to explore possible spiritual practices that might provide a release for pent up emotions,including meditation, smudging, prayer and connection with the earth. Elvin has Indigenous roots and is interested in learning more about her spiritual heritage. She will pursue this connection in her community.    PLAN: I will visit Elvin again is she does not discharge tonight as anticipated.    Elvira [place]  Oncology   Pager [phone]

## 2018-01-22 NOTE — DISCHARGE SUMMARY
"[facility], [place]    Discharge Summary  Hematology / Oncology    Date of Admission:  1/18/2018  Date of Discharge: 1/22/18   Discharging Provider: Destiny Mtz    Discharge Diagnoses   Active Problems:    Diffuse large B cell lymphoma (H)      History of Present Illness   Tisha Arias is a 57 year old female with high grade DLBCL and PMHx significant for breast cancer s/p bilateral mastectomies & BENJIE/BSO, papillary thyroid cancer s/p total thyroidectomy, chronic chest wall pain, muscle spasms, asthma, MCAS and h/o Rachael en Y gastric bypass, who is admitted for Cycle 6 of DA-R-EPOCH. She tolerated chemotherapy well without significant toxicities.  She has follow up arranged on Wednesday, 1/24 to receive Neulasta injection.  She will also see SANJANA and have labs done on 1/24.  Meds refilled at discharge.  She was given a refill of #30 oxycodone tabs at discharge.        Hospital Course   Tisha Arias was admitted on 1/18/2018.  The following problems were addressed during her hospitalization:    HEME/ONC  # High grade B cell lymphoma. \"Double hit-like\". Primary is Dr. Sauceda. Patient diagnosed August 2017. Lytic lesion of right 10th rib with extension. Disease localized above the diaphragm in thoracic and paravertebral soft tissue and mediastinal lymphadenopathy. 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 Staging LP and BMBx were negative for lymphoma. Initiated cycle 1 DA-R-EPOCH on 10/6/17. Has completed 5 cycles. She now presents for cycle 6 DA-R-EPOCH, appropriate for 20% dose increase of cytoxan, etoposide and doxorubicin per protocol. Had PET showing CR on 12/21/17.      REGIMEN: Cycle 6 DA-R-EPOCH (Day 1=1/18/18);    Rituxan 750 mg D1 (completed)  Prednisone 60 mg bid D1-5  Etoposide 145 mg CIVI D1-4  Vincristine 0.8 mg CIVI " D1-4  Doxorubicin 29 mg CIVI D1-4  Cytoxan 2170 mg D5  IT MTX D2- Done on 1/19; flow negative for blasts.   Premeds: Tylenol, benadryl, zofran 16 mg D1-5, Emend 150 mg D5  Dex 8 mg D6 and 7  Neulasta D6      #Anemia. 2/2 chemotherapy. no transfusions needed during hospitalization.   - Hgb >7 and plt >10k       #Stage 1, ER/IL-positive, HER2-negative breast cancer. Follows with Dr. Crawley. Diagnosed in 2011. Oncotype recurrence score of 11 (7% recurrence risk after 5 years of tamoxifen). S/p bilateral mastectomies and BENJIE/BSO in 2012. Was initially on Arimidex (4877-2011) but changed to Tamoxifen due to arthralgias. Tamoxifen held since 10/5 and continue to hold with chemotherapy. Last f/u Dr. Crawley was on 11/27/17.      # Papillary thyroid cancer. Follows with Dr. Castro. Diagnosed in 2013. S/p total thyroidectomy and CND. Received ETOH treatment August 2014, October 2014 and December 2014. Has post-surgical hypothyroidism.  -Continue PTA Levothyroxine.  -Continue PTA calcitriol.      GI  # H/o Rachael en Y gastric bypass. Likely affecting absorption, thus patient is on multiple supplements.  - Continue supplements (calcium citrate-vitamin D, vitamin D3, magnesium citrate). Also has iron deficiency anemia likely from poor absorption, continue 325mg PO ferrous sulfate TID (resume outpatient).       Pain  # Chronic chest wall pain after mastectomy.   -Continue MS Contin 30/30/60 mg TID.  -Oxycodone 15-30 mg q4hrs.  -Celebrex 200 BID.   -Lidocaine cream prn.      #Chronic muscle cramps.   - Robaxin 750 mg QID, Valium 5 mg TID, Flexeril tid.      ID  #Anti-infectives.  - Continue PTA acyclovir  - Held Levaquin and fluconazole ppx as patient is not neutropenic.   - Resumed on discharge or when neutropenic      CV  #Lymphedema of lower extremities.  - On HCTZ & lasix PTA   - received IV lasix 40 mg IV on 1/20 and 1/21 with good UOP.  Pt to resume home lasix, increased K+ to 40mEq daily at discharge.  May need to decrease  dose outpatient.       PULM  #Asthma, allergies.   -Continue PTA singular, zyrtec.      FEN  -IVF per chemo regimen.  -PTA electrolyte replacements and lyte protocol.  -RDAT.      PPx  -VTE: lovenox held prior to LP  -PUD: PTA ranitidine.  -Bowels: PTA docusate and miralax.       FULL CODE      DISPO: plan to discharge home today.    Follow up: Has f/u scheduled in oncology clinic with SANJANA, labs and Neulasta on Wed 1/24       Destiny Mtz, DNP, APRN, CNP   Hematology/oncology   [id number]       Pending Results   These results will be followed up by primary team in [facility].   Unresulted Labs Ordered in the Past 30 Days of this Admission     Date and Time Order Name Status Description    1/19/2018 0805 CSF Culture Aerobic Bacterial Preliminary     1/9/2018 0938 Platelets prepare order unit In process     12/29/2017 1420 Platelets prepare order unit In process     11/27/2017 1422 Platelets prepare order unit In process           Code Status   Full Code    Primary Care Physician   Shahla Crawley    Physical Exam   Temp: 97.6  F (36.4  C) Temp src: Oral BP: 108/65 Pulse: 87 Heart Rate: 79 Resp: 20 SpO2: 96 % O2 Device: None (Room air)    Vitals:    01/20/18 0735 01/21/18 0745 01/22/18 0700   Weight: 89 kg (196 lb 4.8 oz) 88.3 kg (194 lb 9.6 oz) 87.2 kg (192 lb 3.9 oz)     Vital Signs with Ranges  Temp:  [96.9  F (36.1  C)-98.4  F (36.9  C)] 97.6  F (36.4  C)  Pulse:  [73-87] 87  Heart Rate:  [] 79  Resp:  [16-20] 20  BP: (108-128)/(54-74) 108/65  SpO2:  [93 %-96 %] 96 %  I/O last 3 completed shifts:  In: 8903 [P.O.:6388; I.V.:40; IV Piggyback:2475]  Out: 5675 [Urine:5675]    Constitutional: awake, alert, oriented, in NAD   ENT: MMM, no lesions or thrush in OP.   Respiratory: LS CTA, no wheezing or crackles  Cardiovascular: HRR, no murmur or rub   GI: abd soft, non tender, not distended.  Normal BS.   Lymph/Hematologic: no s/s bleeding   Skin: warm, dry and intact.  No rash visible on exposed surfaces    Musculoskeletal: moves all extremities spontaneously, +2 b/l LE edema   Neurologic: A&O x 4, no focal deficits, normal gait   Neuropsychiatric: affect calm, appropriate.     Time Spent on this Encounter   I, Destiny Mtz, personally saw the patient today and spent greater than 30 minutes discharging this patient.    Discharge Disposition   Discharged to home  Condition at discharge: Stable    Consultations This Hospital Stay   None    Discharge Orders     Reason for your hospital stay   You were admitted for cycle 6 DA-REPOCH chemotherapy.  You tolerated chemo well.     Follow Up and recommended labs and tests   Follow up in clinic as scheduled on 1/24.     Activity   Your activity upon discharge: activity as tolerated     When to contact your care team   [facility] triage line at [phone] for temp >100.4, uncontrolled nausea/vomiting/diarrhea/constipation, unrelieved pain, bleeding not relieved with pressure, dizziness, chest pain, shortness of breath, loss of consciousness, and any new or concerning symptoms.     Full Code     Diet   Follow this diet upon discharge: regular diet       Discharge Medications   Current Discharge Medication List      CONTINUE these medications which have CHANGED    Details   oxyCODONE IR (ROXICODONE) 30 MG tablet Take 1 tablet (30 mg) by mouth every 4 hours as needed for moderate to severe pain  Qty: 30 tablet, Refills: 0    Associated Diagnoses: High grade B-cell lymphoma (H)      celecoxib (CELEBREX) 200 MG capsule Take 1 capsule (200 mg) by mouth 2 times daily  Qty: 56 capsule, Refills: 0    Associated Diagnoses: Chest wall pain      ondansetron (ZOFRAN-ODT) 8 MG ODT tab Take 1 tablet (8 mg) by mouth every 8 hours as needed  Qty: 30 tablet, Refills: 1    Associated Diagnoses: High grade B-cell lymphoma (H); Nausea and vomiting, intractability of vomiting not specified, unspecified vomiting type      fluconazole (DIFLUCAN) 200 MG tablet Take 1 tablet (200  mg) by mouth daily  Qty: 30 tablet, Refills: 0    Associated Diagnoses: High grade B-cell lymphoma (H)      cetirizine (ZYRTEC ALLERGY) 10 MG tablet Take 1 tablet (10 mg) by mouth 3 times daily On hold for lab test.  Qty: 90 tablet, Refills: 0    Associated Diagnoses: High grade B-cell lymphoma (H)      acyclovir (ZOVIRAX) 400 MG tablet Take 1 tablet (400 mg) by mouth 2 times daily  Qty: 60 tablet, Refills: 0    Associated Diagnoses: High grade B-cell lymphoma (H)      dexamethasone (DECADRON) 4 MG tablet Take 2 tablets (8 mg) by mouth daily for 2 days Please take on 1/23 and 1/24.  Qty: 4 tablet, Refills: 0    Associated Diagnoses: High grade B-cell lymphoma (H)      hydrochlorothiazide (MICROZIDE) 12.5 MG capsule Take 1 capsule (12.5 mg) by mouth daily  Qty: 30 capsule, Refills: 0    Associated Diagnoses: Hypocalcemia      levofloxacin (LEVAQUIN) 250 MG tablet Take 1 tablet (250 mg) by mouth daily  Qty: 30 tablet, Refills: 0    Associated Diagnoses: High grade B-cell lymphoma (H)      calcium citrate-vitamin D (CALCIUM CITRATE +D) 315-250 MG-UNIT TABS per tablet Take 2 tablets by mouth twice a week  Qty: 20 tablet, Refills: 0    Associated Diagnoses: High grade B-cell lymphoma (H); Hypocalcemia      cyclobenzaprine (FLEXERIL) 10 MG tablet Take 1 tablet (10 mg) by mouth 3 times daily as needed  Qty: 30 tablet, Refills: 0    Associated Diagnoses: High grade B-cell lymphoma (H)      methocarbamol (ROBAXIN) 750 MG tablet Take 1 tablet (750 mg) by mouth 4 times daily as needed . Ok to take a 5th Robaxin on very severe days.  Qty: 360 tablet, Refills: 1    Associated Diagnoses: Myofascial pain      ranitidine (ZANTAC) 75 MG tablet Take 1 tablet (75 mg) by mouth 3 times daily  Qty: 90 tablet, Refills: 0    Associated Diagnoses: Mast cell disease, systemic      Cholecalciferol (VITAMIN D3) 2000 UNITS CAPS Take 5,000 Units by mouth daily Takes 2 tabs daily  Qty: 60 capsule, Refills: 0    Associated Diagnoses: Thyroid  cancer (H); Postsurgical hypothyroidism; Papillary carcinoma, follicular variant (H); Postsurgical hypoparathyroidism (H)      furosemide (LASIX) 20 MG tablet Take 1 tablet (20 mg) by mouth 2 times daily  Qty: 60 tablet, Refills: 0    Associated Diagnoses: Localized edema      calcitRIOL (ROCALTROL) 0.25 MCG capsule TAKE 2 CAPSULES BY MOUTH EVERY MORNING AND TAKE 1 CAPSULE BY MOUTH EVERY NIGHT AT BEDTIME  Qty: 90 capsule, Refills: 0    Associated Diagnoses: Postsurgical hypothyroidism; Papillary carcinoma, follicular variant (H); Metastasis to cervical lymph node (H)      cromolyn sodium (NASALCROM) 5.2 MG/ACT AERS Inhaler SPRAY ONE SPRAY( 1 ML) IN NOSTRIL DAILY  Qty: 1 Bottle, Refills: 6    Associated Diagnoses: Mast cell disease, systemic      cromolyn (OPTICROM) 4 % ophthalmic solution Place 1 drop into both eyes 4 times daily  Qty: 10 mL, Refills: 0    Associated Diagnoses: Idiopathic mast cell activation syndrome (H)      montelukast (SINGULAIR) 10 MG tablet Take 2 tablets (20 mg) by mouth 2 times daily  Qty: 60 tablet, Refills: 0    Associated Diagnoses: Idiopathic mast cell activation syndrome (H)      potassium chloride SA (KLOR-CON) 20 MEQ CR tablet Take 1 tablet (20 mEq) by mouth 2 times daily  Qty: 30 tablet, Refills: 0    Associated Diagnoses: Hypokalemia         CONTINUE these medications which have NOT CHANGED    Details   prochlorperazine (COMPAZINE) 10 MG tablet Take 10 mg by mouth as needed  Refills: 3      acetaminophen (TYLENOL) 325 MG tablet Take 2 tablets (650 mg) by mouth every 4 hours as needed for mild pain or fever  Qty: 100 tablet    Associated Diagnoses: High grade B-cell lymphoma (H)      [employer] FIBER GUMMY BEARS CHILDREN CHEW Take 5 g by mouth daily (2 gummy= 5 g =1 serving)    Associated Diagnoses: High grade B-cell lymphoma (H)      bisacodyl (DULCOLAX) 5 MG EC tablet Take 3 tablets (15 mg) by mouth daily as needed for constipation  Qty: 30 tablet, Refills: 0    Associated Diagnoses:  Constipation, unspecified constipation type      polyethylene glycol (MIRALAX) powder Take 17 g (1 capful) by mouth daily  Qty: 510 g, Refills: 0    Associated Diagnoses: Acute constipation      aluminum chloride (DRYSOL) 20 % external solution Apply topically At Bedtime  Qty: 60 mL, Refills: 3    Associated Diagnoses: Rash; Intertrigo      Probiotic Product (PROBIOTIC DAILY PO) Take 1 capsule by mouth daily Lacto acid bifidobacterium    Associated Diagnoses: Breast cancer, unspecified laterality; Thyroid cancer (H); Chronic arthralgias of knees and hips, unspecified laterality      morphine (MS CONTIN) 30 MG 12 hr tablet Take 1 tablet (30 mg) by mouth every 8 hours . Take an addition pill at night such that your evening dose is 60mg  Qty: 120 tablet, Refills: 0    Associated Diagnoses: Neoplasm related pain      magic mouthwash suspension (diphenhydrAMINE, lidocaine, aluminum-magnesium & simethicone) Swish and spit 10 mLs in mouth every 6 hours as needed for mouth sores  Qty: 360 mL, Refills: 1    Associated Diagnoses: Stomatitis and mucositis; High grade B-cell lymphoma (H)      lidocaine-prilocaine (EMLA) cream Apply topically as needed (port access pain.)  Qty: 30 g, Refills: 1    Associated Diagnoses: Neoplasm related pain; Chest wall pain      diazepam (VALIUM) 5 MG tablet Take 1 tablet (5 mg) by mouth 3 times daily as needed For muscle spasms  Qty: 90 tablet, Refills: 2    Associated Diagnoses: Chest wall pain      SUMAtriptan (IMITREX) 50 MG tablet Take 50 mg by mouth as needed  Refills: 3      diclofenac (VOLTAREN) 1 % GEL topical gel Apply affected area two times daily PRN using enclosed dosing card.  Qty: 100 g, Refills: 0    Associated Diagnoses: Myofascial pain      triamcinolone (KENALOG) 0.025 % ointment Apply topically as needed  Refills: 3      lidocaine (XYLOCAINE) 5 % ointment Apply quarter size amount to chest and back up to 3 times daily as needed for pain.  Qty: 350 g, Refills: 1    Associated  Diagnoses: Chest wall pain      senna-docusate (SENOKOT-S;PERICOLACE) 8.6-50 MG per tablet Take 1-2 tablets by mouth 2 times daily as needed for constipation  Qty: 60 tablet, Refills: 0    Associated Diagnoses: Acute constipation      !! UNABLE TO FIND Take 2 capsules by mouth 3 times daily Muscle Mag. 2 caps contain B1 20mg, B2 20mg, B6 10mg, magesium 20mg, manganese 2mg.      !! order for DME Compression stockings, medium grade, please measure and fit. Please dispense a pair.  Qty: 1 Units, Refills: 0    Associated Diagnoses: Peripheral edema      COMPOUND CONTAINING CONTROLLED SUBSTANCE (CMPD RX) - PHARMACY TO MIX COMPOUNDED MEDICATION Apply small amount to affected area two times daily.  Qty: 120 g, Refills: 6    Comments: Ketamine 6% + lidocaine 10% in Lipo.  Associated Diagnoses: Neoplasm related pain      levothyroxine (SYNTHROID/LEVOTHROID) 112 MCG tablet Mon-Sat: (2 tabs daily) Sunday: 3 tabs  Qty: 189 tablet, Refills: 3    Associated Diagnoses: Postsurgical hypothyroidism; Papillary carcinoma, follicular variant (H); Postsurgical hypoparathyroidism (H); Thyroid cancer (H)      EPINEPHrine (EPIPEN 2-CARIDAD) 0.3 MG/0.3ML injection Inject 0.3 mLs (0.3 mg) into the muscle once as needed for anaphylaxis  Qty: 0.6 mL, Refills: 3      VENTOLIN  (90 BASE) MCG/ACT Inhaler INHALE 2 PUFFS INTO THE LUNGS EVERY 4 HOURS AS NEEDED FOR SHORTNESS OF BREATH OR DIFFICULT BREATHING OR WHEEZING  Qty: 18 g, Refills: 3    Associated Diagnoses: Mild intermittent asthma without complication      !! order for DME Equipment being ordered: compression stockings. 20-30 mm or 30 - 40 mm as patient can tolerate. Physical therapy to determine if they should be above or below the knee.  Qty: 2 Units, Refills: 4    Associated Diagnoses: Venous stasis      !! order for DME Equipment being ordered: compression bra  Qty: 2 Device, Refills: 1    Associated Diagnoses: Malignant neoplasm of right female breast, unspecified site of breast       !! UNABLE TO FIND 3 tablets 3 times daily MEDICATION NAME: calcium D-Glucarate   3 caps contain 180mg of elemental calcium.      Triamcinolone Acetonide (AZMACORT IN) Inhale 2 puffs into the lungs as needed      !! UNABLE TO FIND 2 tablets 3 times daily MEDICATION NAME: Digestzymes     Associated Diagnoses: Thyroid cancer (H); Postsurgical hypothyroidism; Postsurgical hypoparathyroidism (H)      !! UNABLE TO FIND 1 tablet daily MEDICATION NAME: Pure Encapsulations    Associated Diagnoses: Thyroid cancer (H); Postsurgical hypothyroidism; Postsurgical hypoparathyroidism (H)       !! - Potential duplicate medications found. Please discuss with provider.      STOP taking these medications       allopurinol (ZYLOPRIM) 300 MG tablet Comments:   Reason for Stopping:             Allergies   Allergies   Allergen Reactions     Baclofen Other (See Comments) and Unknown     Other reaction(s): Edema, chest pain, seizures.      No Clinical Screening - See Comments Shortness Of Breath, Palpitations, Anaphylaxis, Itching, Swelling, Difficulty breathing and Rash     Sukhjinder wipes- oral allergy -  July 2015: throat tightness from a Chinese herbal medicine Wilmer Barry Tran     Serotonin Anxiety, Other (See Comments) and Swelling     Seizures      Tree Nuts [Nuts] Shortness Of Breath     Amitriptyline Hcl Swelling     Birch Trees      Potatoes, carrots, cherries, celery, apple, pears, plums, peaches, parsnip, kiwi, hazelnuts, and apricots,      Blue Dyes Itching     Headaches       Cymbalta Other (See Comments)     Flushing, tremor/muscle twitching and edema     Gabapentin Other (See Comments)     edema  Systemic edema, weaned off from Feb to March per Dr. Dowd.    edema     Grass      Mugwort [Artemisia Vulgaris]      Various spices     Ragweeds      Melons, bananas, cucumbers, zucchini.     Topamax      Nortriptyline Itching, Visual Disturbance, Swelling, GI Disturbance, Anxiety, Other (See Comments) and Nausea     Other  reaction(s): Swelling     Data   Most Recent 3 CBC's:  Recent Labs   Lab Test  01/22/18   0622  01/21/18   0701  01/20/18   0627   WBC  4.8  6.5  9.1   HGB  8.8*  9.0*  8.6*   MCV  101*  103*  103*   PLT  232  268  256      Most Recent 3 BMP's:  Recent Labs   Lab Test  01/22/18   1450  01/22/18   0622  01/21/18   0701   01/20/18   0627   NA   --   142  144   --   143   POTASSIUM  4.0  3.2*  3.8   < >  3.6   CHLORIDE   --   102  106   --   108   CO2   --   31  30   --   26   BUN   --   24  22   --   21   CR   --   0.72  0.80   --   0.87   ANIONGAP   --   8  8   --   9   ELMO   --   7.9*  8.2*   --   8.1*   GLC   --   119*  123*   --   135*    < > = values in this interval not displayed.     Most Recent 2 LFT's:  Recent Labs   Lab Test  01/18/18   0857  01/09/18   0703   AST  18  14   ALT  18  32   ALKPHOS  67  73   BILITOTAL  1.0  0.5     Most Recent INR's and Anticoagulation Dosing History:  Anticoagulation Dose History     Recent Dosing and Labs Latest Ref Rng & Units 9/27/2017 10/2/2017 10/28/2017 10/29/2017 10/30/2017 10/31/2017 12/28/2017    INR 0.86 - 1.14 1.02 1.14 0.94 1.02 0.96 1.03 0.99    INR Point of Care 0.86 - 1.14 - - - - - - -        Most Recent 3 Troponin's:  Recent Labs   Lab Test  12/26/17   1402  10/21/17   1935  10/04/17   1245   05/28/13   1439   TROPI  <0.015  <0.015  <0.015   < >   --    TROPONIN   --    --    --    --   0.13*    < > = values in this interval not displayed.     Most Recent Cholesterol Panel:  Recent Labs   Lab Test  05/21/14   1040   CHOL  168   LDL  81   HDL  62   TRIG  124     Most Recent 6 Bacteria Isolates From Any Culture (See EPIC Reports for Culture Details):  Recent Labs   Lab Test  01/19/18   1440  01/10/18   1038  01/10/18   1022  12/28/17   1400  12/08/17   1410  11/17/17   1155   CULT  Culture negative monitoring continues  No growth  No growth  No growth  No growth  No growth     Most Recent TSH, T4 and A1c Labs:  Recent Labs   Lab Test  11/02/17   1358    01/21/13   0936   TSH  0.22*   < >   --    T4  1.30   < >   --    A1C   --    --   5.5    < > = values in this interval not displayed.     Results for orders placed or performed during the hospital encounter of 01/18/18   XR Lumbar Punc Intrathecal Chemo Admin    Narrative    Lumbar Puncture using Fluoroscopy with intrathecal chemotherapy  injection    Provided History:  Chemotherapy for diffuse large B cell lymphoma.  ICD-10: Lumbar puncture       Procedure note: Verbal and written consent for lumbar puncture was  obtained from the patient, and benefits and risk of the procedure were  explained, including but not limited to worsening headache,  hemorrhage, infection, lower extremity pain, or nerve root injury. The  patient was sterilely prepped and draped with the patient in the prone  position, over the lower back. Under fluoroscopic guidance, the  interlaminar spaces were noted. 1% lidocaine was administered for  local anesthetic over the L3-L4 interlaminar space, and a 22 gauge 3.5  inch needle was advanced into the thecal sac under fluoroscopic  guidance.  There was initial show of clear CSF. Approximately 8 cc of  CSF were collected. Intrathecal chemotherapy therapy is administered  by patient's hematologist.    The needle was removed with the stylet in place. There was no  immediate complication associated with the procedure. Samples were  sent for the requested laboratory testing.      Estimated blood loss: Less than 1 cc.    Fluoroscopic time: 0.298 minute      Impression    Impression: Successful lumbar puncture without immediate complication.    I, SEA LACKEY MD, attest that I was present for all critical  portions of the procedure and was immediately available to provide  guidance and assistance during the remainder of the procedure.    I have personally reviewed the examination and initial interpretation  and I agree with the findings.    SEA LACKEY MD     *Note: Due to a large number of results  and/or encounters for the requested time period, some results have not been displayed. A complete set of results can be found in Results Review.

## 2018-01-22 NOTE — PROGRESS NOTES
Etoposide, VCR, and Doxorubicin running CIVI all day via right port with great blood return and finished this afternoon around 1530. IV Emend and PO zofran given prior to day #5 Cytoxan over 1 hour via port with great blood return. Discharge paperwork reviewed with patient and her  is coming to pick her up this afternoon. 16 medications are ready to be picked up in the discharge pharmacy.     Cytoxan completed without incident. Port HL'd and de-accessed. Pt's  here to pick her up and she left accompanied by him.They are planning on picking up d/c meds on her way out.  Pt has phone numbers to call if problems arise and plans to f/u in clinic on 1/24 for labs, PA visit and Neulasta.

## 2018-01-22 NOTE — PLAN OF CARE
Problem: Patient Care Overview  Goal: Plan of Care/Patient Progress Review    Tachycardic. OVSS. Day 4 of 4 CIVI chemo hung this shift. Received Lasix as scheduled. BLE edema slightly improving. Voiding good amounts of urine. One small BM today. Continues with Miralax and Senna BID. Pain managed with scheduled pain meds. Hoping to d/c tomorrow after chemo finishes. Continue POC.

## 2018-01-23 ENCOUNTER — CARE COORDINATION (OUTPATIENT)
Dept: ONCOLOGY | Facility: CLINIC | Age: 58
End: 2018-01-23

## 2018-01-23 ENCOUNTER — TELEPHONE (OUTPATIENT)
Dept: FAMILY MEDICINE | Facility: CLINIC | Age: 58
End: 2018-01-23

## 2018-01-23 NOTE — TELEPHONE ENCOUNTER
This patient was discharged from [facility] on 01/22/2018.    Discharge Diagnosis: Lymphoma,Diffuse large B cell Lymphoma    A follow-up visit has been scheduled.  [facility]     Number of ED/ER visits in the last 12 months:  2     Please follow-up with patient.    Dionne Rich,

## 2018-01-23 NOTE — PROGRESS NOTES
RN Care Coordination Note  POST HOSPITAL DISCHARGE CALL    No post hospital call needed as patient has an oncology provider appointment within 72 hours.    Appointment info:  [facility] 1/24/18 9:45 AM [facility] LAB DRAW [facility] LAB DRAW                 [facility] 1/24/18 10:30 AM [facility] KATY BELL, RN, BSN, OCN  Care Coordinator  [facility]

## 2018-01-24 ENCOUNTER — APPOINTMENT (OUTPATIENT)
Dept: LAB | Facility: CLINIC | Age: 58
End: 2018-01-24
Payer: COMMERCIAL

## 2018-01-24 ENCOUNTER — RECORDS - HEALTHEAST (OUTPATIENT)
Dept: ADMINISTRATIVE | Facility: OTHER | Age: 58
End: 2018-01-24

## 2018-01-24 ENCOUNTER — ONCOLOGY VISIT (OUTPATIENT)
Dept: ONCOLOGY | Facility: CLINIC | Age: 58
End: 2018-01-24
Attending: PHYSICIAN ASSISTANT
Payer: COMMERCIAL

## 2018-01-24 VITALS
BODY MASS INDEX: 31.71 KG/M2 | WEIGHT: 190.3 LBS | TEMPERATURE: 98.2 F | DIASTOLIC BLOOD PRESSURE: 79 MMHG | HEART RATE: 85 BPM | OXYGEN SATURATION: 98 % | HEIGHT: 65 IN | SYSTOLIC BLOOD PRESSURE: 124 MMHG

## 2018-01-24 DIAGNOSIS — D47.02 MAST CELL DISEASE, SYSTEMIC: Chronic | ICD-10-CM

## 2018-01-24 DIAGNOSIS — C85.10 HIGH GRADE B-CELL LYMPHOMA (H): Primary | ICD-10-CM

## 2018-01-24 DIAGNOSIS — M79.18 MYOFASCIAL PAIN: ICD-10-CM

## 2018-01-24 DIAGNOSIS — R07.89 CHEST WALL PAIN: ICD-10-CM

## 2018-01-24 DIAGNOSIS — G89.3 NEOPLASM RELATED PAIN: ICD-10-CM

## 2018-01-24 LAB
ANION GAP SERPL CALCULATED.3IONS-SCNC: 7 MMOL/L (ref 3–14)
BACTERIA SPEC CULT: NO GROWTH
BASOPHILS # BLD AUTO: 0 10E9/L (ref 0–0.2)
BASOPHILS NFR BLD AUTO: 0.2 %
BUN SERPL-MCNC: 23 MG/DL (ref 7–30)
CALCIUM SERPL-MCNC: 7.7 MG/DL (ref 8.5–10.1)
CHLORIDE SERPL-SCNC: 102 MMOL/L (ref 94–109)
CO2 SERPL-SCNC: 30 MMOL/L (ref 20–32)
CREAT SERPL-MCNC: 0.63 MG/DL (ref 0.52–1.04)
DIFFERENTIAL METHOD BLD: ABNORMAL
EOSINOPHIL # BLD AUTO: 0 10E9/L (ref 0–0.7)
EOSINOPHIL NFR BLD AUTO: 0 %
ERYTHROCYTE [DISTWIDTH] IN BLOOD BY AUTOMATED COUNT: 16.8 % (ref 10–15)
GFR SERPL CREATININE-BSD FRML MDRD: >90 ML/MIN/1.7M2
GLUCOSE SERPL-MCNC: 124 MG/DL (ref 70–99)
HCT VFR BLD AUTO: 27.7 % (ref 35–47)
HGB BLD-MCNC: 9.4 G/DL (ref 11.7–15.7)
IMM GRANULOCYTES # BLD: 0.1 10E9/L (ref 0–0.4)
IMM GRANULOCYTES NFR BLD: 0.8 %
LYMPHOCYTES # BLD AUTO: 0.1 10E9/L (ref 0.8–5.3)
LYMPHOCYTES NFR BLD AUTO: 0.5 %
MCH RBC QN AUTO: 33.6 PG (ref 26.5–33)
MCHC RBC AUTO-ENTMCNC: 33.9 G/DL (ref 31.5–36.5)
MCV RBC AUTO: 99 FL (ref 78–100)
MONOCYTES # BLD AUTO: 0 10E9/L (ref 0–1.3)
MONOCYTES NFR BLD AUTO: 0.1 %
NEUTROPHILS # BLD AUTO: 10.9 10E9/L (ref 1.6–8.3)
NEUTROPHILS NFR BLD AUTO: 98.4 %
NRBC # BLD AUTO: 0 10*3/UL
NRBC BLD AUTO-RTO: 0 /100
PLATELET # BLD AUTO: 241 10E9/L (ref 150–450)
POTASSIUM SERPL-SCNC: 3.8 MMOL/L (ref 3.4–5.3)
RBC # BLD AUTO: 2.8 10E12/L (ref 3.8–5.2)
SODIUM SERPL-SCNC: 139 MMOL/L (ref 133–144)
SPECIMEN SOURCE: NORMAL
WBC # BLD AUTO: 11.1 10E9/L (ref 4–11)

## 2018-01-24 PROCEDURE — 80048 BASIC METABOLIC PNL TOTAL CA: CPT | Performed by: PHYSICIAN ASSISTANT

## 2018-01-24 PROCEDURE — 85025 COMPLETE CBC W/AUTO DIFF WBC: CPT | Performed by: PHYSICIAN ASSISTANT

## 2018-01-24 PROCEDURE — G0463 HOSPITAL OUTPT CLINIC VISIT: HCPCS | Mod: ZF

## 2018-01-24 PROCEDURE — 25000128 H RX IP 250 OP 636: Mod: ZF | Performed by: PHYSICIAN ASSISTANT

## 2018-01-24 PROCEDURE — 36591 DRAW BLOOD OFF VENOUS DEVICE: CPT

## 2018-01-24 PROCEDURE — 99214 OFFICE O/P EST MOD 30 MIN: CPT | Mod: ZP | Performed by: PHYSICIAN ASSISTANT

## 2018-01-24 PROCEDURE — 96372 THER/PROPH/DIAG INJ SC/IM: CPT

## 2018-01-24 RX ORDER — POLYDEXTROSE 1.5 G
5 TABLET,CHEWABLE ORAL DAILY
Qty: 120 TABLET | Refills: 3 | Status: SHIPPED | OUTPATIENT
Start: 2018-01-24 | End: 2021-08-11

## 2018-01-24 RX ORDER — DIAZEPAM 5 MG
5 TABLET ORAL 3 TIMES DAILY
COMMUNITY
Start: 2018-01-24 | End: 2018-02-04

## 2018-01-24 RX ORDER — CROMOLYN SODIUM 5.2 MG
AEROSOL, SPRAY WITH PUMP (ML) NASAL
Qty: 1 BOTTLE | Refills: 6 | Status: SHIPPED | OUTPATIENT
Start: 2018-01-24 | End: 2021-08-11

## 2018-01-24 RX ORDER — LIDOCAINE/PRILOCAINE 2.5 %-2.5%
CREAM (GRAM) TOPICAL PRN
Qty: 30 G | Refills: 1 | Status: SHIPPED | OUTPATIENT
Start: 2018-01-24 | End: 2019-02-18

## 2018-01-24 RX ORDER — HEPARIN SODIUM (PORCINE) LOCK FLUSH IV SOLN 100 UNIT/ML 100 UNIT/ML
5 SOLUTION INTRAVENOUS ONCE
Status: COMPLETED | OUTPATIENT
Start: 2018-01-24 | End: 2018-01-24

## 2018-01-24 RX ORDER — METHOCARBAMOL 750 MG/1
750 TABLET, FILM COATED ORAL 4 TIMES DAILY
COMMUNITY
Start: 2018-01-24 | End: 2018-05-24

## 2018-01-24 RX ADMIN — PEGFILGRASTIM 6 MG: 6 INJECTION SUBCUTANEOUS at 12:01

## 2018-01-24 RX ADMIN — SODIUM CHLORIDE, PRESERVATIVE FREE 5 ML: 5 INJECTION INTRAVENOUS at 10:17

## 2018-01-24 ASSESSMENT — PAIN SCALES - GENERAL: PAINLEVEL: EXTREME PAIN (8)

## 2018-01-24 NOTE — LETTER
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 She is currently undergoing treatment with DA-R-EPOCH. Her past medical history is significant for breast cancer in 2011 s/p bilateral mastectomies and BENJIE/BSO up until recently on Tamoxifen, papillary thyroid cancer s/p total thyroidectomy, chronic chest wall pain, and asthma. Briefly, her oncologic history is as follows:     She presented in 8/2017 with dramatically worse chest and back pain. CT 9/1/17 showed lytic lesion right 10th rib extending to right T9-10 neural foramen with vertebral body invasion. There was associated conglomerate of lymphadenopathy around the mid T-spine. She had a CT guided biopsy showing B-cell high grade lymphoma. Pathology showed \"The morphology shows a population of large cells, diffuse lymphoid infiltrate. The cells are atypical with abundant mitotic and apoptotic activity and single cell necrosis. Tumor is CD45 and CD79a positive and focally weakly CD30 positive. The other stains revealed positivity for CD20, BCL6, BCL2 and MUM1, and CD10 and CD21 negative. The CD5 and CD3 highlighted background T-cells.  MYC expression was equivocal with approximately 40% nuclei staining.  Ki-67 proliferative index is greater than 90-95%. The immunohistochemistry is suggestive of non-GCB immunophenotype of diffuse large B-cell lymphoma. The cytogenetics did not show rearrangement of the BCL2 or c-MYC. There is the presence of BCL6 rearrangement in 78% of cells and gains of MYC and BCL2, but no amplifications.\"      Staging LP and BMBx were negative for lymphoma. She started DA-REPOCH 10/6/17. She did well with chemo other than rigors during CIVI. D/c 10/11/17. " Given Neulasta 10/12/17. Post cycle 1 complicated by mucositis and worsening of chronic LE peripheral edema. She began cycle 2 on 10/27/17. Due to a rise in her LD and uric acid levels on that admission day, she underwent a CT scan which showed response to therapy with improvement in her mediastinal lymphadenopathy and right chest wall mass. She had a CR following 4 cycles on PET/CT 12/21.     Interval History:  [place] states she has felt mostly fatigue since hospital discharge and does not seem to recover in between cycles. She has ongoing edema in bilateral extremities but she is almost at her prior to admission weight and is wearing compression stockings. She is taking lasix 20 mg BID and her potassium was low prior to discharge and she is now on 40 mEqs daily. She denies any lightheadedness, but does feel a little weaker going up stairs, perhaps because her legs are heavier. She has mild dyspnea with this exertion, but no chest pain or SOB at rest. She has fallen at home before, perhaps twice since diagnosis. She is unsure at this time whether she would like to try PT at our cancer rehab program, but is agreeable to a referral if she reconsiders. She denies any fever, chills, cough, sore throat. She has some clear, chronic rhinorrhea but no sinus pressure or pain. She is on Acyclovir BID. She had some nausea with this round of chemo, but no vomiting. She has anti-emetic medication if needed. No constipation since hospital discharge.    Review of Systems:  Patient denies any of the following except if noted above: diarrhea, urinary concerns, rashes or skin lesions, bleeding or bruising issues.    Current Outpatient Prescriptions   Medication Sig Dispense Refill     cromolyn sodium (NASALCROM) 5.2 MG/ACT AERS Inhaler SPRAY ONE SPRAY( 1 ML) IN NOSTRIL DAILY 1 Bottle 6     lidocaine-prilocaine (EMLA) cream Apply topically as needed (port access pain.) 30 g 1     diazepam (VALIUM) 5 MG tablet Take 1 tablet (5 mg)  by mouth 3 times daily For muscle spasms       [employer] FIBER GUMMY BEARS CHILDREN CHEW Take 5 g by mouth daily (2 gummy= 5 g =1 serving) 120 tablet 3     methocarbamol (ROBAXIN) 750 MG tablet Take 1 tablet (750 mg) by mouth 4 times daily . Ok to take a 5th Robaxin on very severe days.       diclofenac (VOLTAREN) 1 % GEL topical gel Apply affected area two times daily PRN using enclosed dosing card. 100 g 0     [START ON 1/27/2018] oxyCODONE IR (ROXICODONE) 30 MG tablet Take 1 tablet (30 mg) by mouth every 4 hours as needed for moderate to severe pain (Patient taking differently: Take 30 mg by mouth every 4 hours ) 30 tablet 0     celecoxib (CELEBREX) 200 MG capsule Take 1 capsule (200 mg) by mouth 2 times daily 56 capsule 0     ondansetron (ZOFRAN-ODT) 8 MG ODT tab Take 1 tablet (8 mg) by mouth every 8 hours as needed (Patient taking differently: Take 8 mg by mouth every 8 hours ) 30 tablet 1     fluconazole (DIFLUCAN) 200 MG tablet Take 1 tablet (200 mg) by mouth daily 30 tablet 0     cetirizine (ZYRTEC ALLERGY) 10 MG tablet Take 1 tablet (10 mg) by mouth 3 times daily On hold for lab test. 90 tablet 0     acyclovir (ZOVIRAX) 400 MG tablet Take 1 tablet (400 mg) by mouth 2 times daily 60 tablet 0     dexamethasone (DECADRON) 4 MG tablet Take 2 tablets (8 mg) by mouth daily for 2 days Please take on 1/23 and 1/24. 4 tablet 0     hydrochlorothiazide (MICROZIDE) 12.5 MG capsule Take 1 capsule (12.5 mg) by mouth daily 30 capsule 0     levofloxacin (LEVAQUIN) 250 MG tablet Take 1 tablet (250 mg) by mouth daily 30 tablet 0     calcium citrate-vitamin D (CALCIUM CITRATE +D) 315-250 MG-UNIT TABS per tablet Take 2 tablets by mouth twice a week 20 tablet 0     cyclobenzaprine (FLEXERIL) 10 MG tablet Take 1 tablet (10 mg) by mouth 3 times daily as needed (Patient taking differently: Take 10 mg by mouth 3 times daily ) 30 tablet 0     ranitidine (ZANTAC) 75 MG tablet Take 1 tablet (75 mg) by mouth 3 times daily 90 tablet 0      Cholecalciferol (VITAMIN D3) 2000 UNITS CAPS Take 5,000 Units by mouth daily Takes 2 tabs daily 60 capsule 0     furosemide (LASIX) 20 MG tablet Take 1 tablet (20 mg) by mouth 2 times daily 60 tablet 0     calcitRIOL (ROCALTROL) 0.25 MCG capsule TAKE 2 CAPSULES BY MOUTH EVERY MORNING AND TAKE 1 CAPSULE BY MOUTH EVERY NIGHT AT BEDTIME (Patient taking differently: Take 0.25 mcg by mouth 2 times daily TAKE 2 CAPSULES BY MOUTH EVERY MORNING AND TAKE 1 CAPSULE BY MOUTH EVERY NIGHT AT NOON.) 90 capsule 0     cromolyn (OPTICROM) 4 % ophthalmic solution Place 1 drop into both eyes 4 times daily 10 mL 0     montelukast (SINGULAIR) 10 MG tablet Take 2 tablets (20 mg) by mouth 2 times daily 60 tablet 0     potassium chloride SA (KLOR-CON) 20 MEQ CR tablet Take 1 tablet (20 mEq) by mouth 2 times daily 30 tablet 0     [START ON 1/27/2018] morphine (MS CONTIN) 30 MG 12 hr tablet Take 1 tablet (30 mg) by mouth every 8 hours . Take an addition pill at night such that your evening dose is 60mg 120 tablet 0     magic mouthwash suspension (diphenhydrAMINE, lidocaine, aluminum-magnesium & simethicone) Swish and spit 10 mLs in mouth every 6 hours as needed for mouth sores 360 mL 1     prochlorperazine (COMPAZINE) 10 MG tablet Take 10 mg by mouth as needed  3     SUMAtriptan (IMITREX) 50 MG tablet Take 50 mg by mouth as needed  3     acetaminophen (TYLENOL) 325 MG tablet Take 2 tablets (650 mg) by mouth every 4 hours as needed for mild pain or fever 100 tablet      triamcinolone (KENALOG) 0.025 % ointment Apply topically as needed  3     lidocaine (XYLOCAINE) 5 % ointment Apply quarter size amount to chest and back up to 3 times daily as needed for pain. 350 g 1     bisacodyl (DULCOLAX) 5 MG EC tablet Take 3 tablets (15 mg) by mouth daily as needed for constipation 30 tablet 0     polyethylene glycol (MIRALAX) powder Take 17 g (1 capful) by mouth daily 510 g 0     senna-docusate (SENOKOT-S;PERICOLACE) 8.6-50 MG per tablet Take 1-2  tablets by mouth 2 times daily as needed for constipation 60 tablet 0     UNABLE TO FIND Take 2 capsules by mouth 3 times daily Muscle Mag. 2 caps contain B1 20mg, B2 20mg, B6 10mg, magesium 20mg, manganese 2mg.       order for DME Compression stockings, medium grade, please measure and fit. Please dispense a pair. 1 Units 0     levothyroxine (SYNTHROID/LEVOTHROID) 112 MCG tablet Mon-Sat: (2 tabs daily) Sunday: 3 tabs (Patient taking differently: 112 mcg Mon-Sat: (2 tabs daily) Sunday: 3 tabs) 189 tablet 3     VENTOLIN  (90 BASE) MCG/ACT Inhaler INHALE 2 PUFFS INTO THE LUNGS EVERY 4 HOURS AS NEEDED FOR SHORTNESS OF BREATH OR DIFFICULT BREATHING OR WHEEZING 18 g 3     order for DME Equipment being ordered: compression stockings. 20-30 mm or 30 - 40 mm as patient can tolerate. Physical therapy to determine if they should be above or below the knee. 2 Units 4     order for DME Equipment being ordered: compression bra 2 Device 1     aluminum chloride (DRYSOL) 20 % external solution Apply topically At Bedtime 60 mL 3     UNABLE TO FIND 3 tablets 3 times daily MEDICATION NAME: calcium D-Glucarate   3 caps contain 180mg of elemental calcium.       Triamcinolone Acetonide (AZMACORT IN) Inhale 2 puffs into the lungs as needed       UNABLE TO FIND 2 tablets 3 times daily MEDICATION NAME: Digestzymes        UNABLE TO FIND 1 tablet daily MEDICATION NAME: Pure Encapsulations       Probiotic Product (PROBIOTIC DAILY PO) Take 1 capsule by mouth daily Lacto acid bifidobacterium       [DISCONTINUED] lidocaine-prilocaine (EMLA) cream Apply topically as needed (port access pain.) 30 g 1     COMPOUND CONTAINING CONTROLLED SUBSTANCE (CMPD RX) - PHARMACY TO MIX COMPOUNDED MEDICATION Apply small amount to affected area two times daily. (Patient not taking: Reported on 1/24/2018) 120 g 6     EPINEPHrine (EPIPEN 2-CARIDAD) 0.3 MG/0.3ML injection Inject 0.3 mLs (0.3 mg) into the muscle once as needed for anaphylaxis (Patient not taking:  "Reported on 1/24/2018) 0.6 mL 3       Physical Examination:  General: The patient is a pleasant female in no acute distress.  /79 (BP Location: Left arm, Patient Position: Chair, Cuff Size: Adult Regular)  Pulse 85  Temp 98.2  F (36.8  C) (Oral)  Ht 1.651 m (5' 5\")  Wt 86.3 kg (190 lb 4.8 oz)  SpO2 98%  BMI 31.67 kg/m2  Wt Readings from Last 10 Encounters:   01/24/18 86.3 kg (190 lb 4.8 oz)   01/22/18 87.2 kg (192 lb 3.9 oz)   01/18/18 88.2 kg (194 lb 8 oz)   01/15/18 86.1 kg (189 lb 14.4 oz)   01/09/18 82.3 kg (181 lb 8 oz)   01/03/18 85.7 kg (189 lb)   01/01/18 87.5 kg (193 lb)   12/26/17 85.5 kg (188 lb 7.9 oz)   12/19/17 83.8 kg (184 lb 11.2 oz)   12/15/17 84.9 kg (187 lb 3.2 oz)     HEENT: EOMI, PERRL. Sclerae are anicteric. Oral mucosa is pink and moist with no lesions or thrush.   Lymph: Neck is supple with no lymphadenopathy in the cervical or supraclavicular areas.   Heart: Regular rate and rhythm.   Lungs: Clear to auscultation bilaterally.   Abdomen: Bowel sounds present, soft, nontender with no palpable hepatosplenomegaly or masses.   Extremities: Wearing compression stockings.   Neuro: Cranial nerves II through XII are grossly intact.  Skin: No rashes, petechiae, or bruising noted on exposed skin.    Laboratory Data:  Results for orders placed or performed in visit on 01/24/18 (from the past 24 hour(s))   CBC with platelets differential   Result Value Ref Range    WBC 11.1 (H) 4.0 - 11.0 10e9/L    RBC Count 2.80 (L) 3.8 - 5.2 10e12/L    Hemoglobin 9.4 (L) 11.7 - 15.7 g/dL    Hematocrit 27.7 (L) 35.0 - 47.0 %    MCV 99 78 - 100 fl    MCH 33.6 (H) 26.5 - 33.0 pg    MCHC 33.9 31.5 - 36.5 g/dL    RDW 16.8 (H) 10.0 - 15.0 %    Platelet Count 241 150 - 450 10e9/L    Diff Method Automated Method     % Neutrophils 98.4 %    % Lymphocytes 0.5 %    % Monocytes 0.1 %    % Eosinophils 0.0 %    % Basophils 0.2 %    % Immature Granulocytes 0.8 %    Nucleated RBCs 0 0 /100    Absolute Neutrophil 10.9 (H) " "1.6 - 8.3 10e9/L    Absolute Lymphocytes 0.1 (L) 0.8 - 5.3 10e9/L    Absolute Monocytes 0.0 0.0 - 1.3 10e9/L    Absolute Eosinophils 0.0 0.0 - 0.7 10e9/L    Absolute Basophils 0.0 0.0 - 0.2 10e9/L    Abs Immature Granulocytes 0.1 0 - 0.4 10e9/L    Absolute Nucleated RBC 0.0    Basic metabolic panel   Result Value Ref Range    Sodium 139 133 - 144 mmol/L    Potassium 3.8 3.4 - 5.3 mmol/L    Chloride 102 94 - 109 mmol/L    Carbon Dioxide 30 20 - 32 mmol/L    Anion Gap 7 3 - 14 mmol/L    Glucose 124 (H) 70 - 99 mg/dL    Urea Nitrogen 23 7 - 30 mg/dL    Creatinine 0.63 0.52 - 1.04 mg/dL    GFR Estimate >90 >60 mL/min/1.7m2    GFR Estimate If Black >90 >60 mL/min/1.7m2    Calcium 7.7 (L) 8.5 - 10.1 mg/dL     *Note: Due to a large number of results and/or encounters for the requested time period, some results have not been displayed. A complete set of results can be found in Results Review.         Assessment and Plan:     1. DLBCL, \"double-hit\"-like, currently on treatment with DA-R-EPOCH  Now s/p 6 cycles with CR on PET/CT after 4.   --Neulasta today  --She is set up for labs and transfusion support on 1/29. Labs on 2/1, 2/5 and 2/8.   --PET/CT on 2/27 and Dr. Sauceda on 2/28    2. Edema: Peripheral edema improved since discharge. Continue Lasix and HCTZ. Wearing compression stockings. On Potassium 40mEqs. K WNL today     3. Heme: Hgb 9.4, WBC 11.1, Platelets 241. Continue twice weekly checks upon d/c and transfuse if Hgb<8 and Plt<10k.      4. ID: No active concerns.   Continue ppx ACV 400mg BID. Hold Levaquin 250mg daily and fluconazole 200mg daily until neutropenic.      5. History of stage 1, ER/ND+, HER2- breast cancer s/p bilateral mastectomies and BENJIE/BSO  Follows with Dr. Crawley. Oncotype recurrence score 11%. Was initially on Arimidex (2951-1570) but changed to Tamoxifen due to arthralgias. Tamoxifen held since 10/5 and continue to hold with chemotherapy. Last f/u Dr. Crawley was on 11/27/17.     6. History of " papillary thyroid cancer, s/p total thyroidectomy  Follows with Dr. Castro. Has post surgical hypothyroidism. Continue levothyroxine and calcitriol as prescribed. Neck u/s on 11/2 shows no evidence of disease.     7. History of Rachael en Y gastric bypass  Continue supplements (calcium citrate-vitamin D, vitamin D3, magnesium citrate). Also has iron deficiency anemia likely from poor absorption and per Dr. Sauceda, she should continue 325mg PO ferrous sulfate TID.      8. Chronic chest wall pain s/p mastectomies  Follows with Dr. Benitez. Palliative care to continue to prescribe pain medications. Taking MS Contin 30/30/60 mg tid and oxycodone prn, currently 15 mg qid for breakthrough pain, and celebrex.      9. Constipation.  Under control with Colace, fiber tabs, and MiraLax when needed.     Selina Elizondo PA-C  [facility]  [address], [place] [address] [phone]

## 2018-01-24 NOTE — MR AVS SNAPSHOT
After Visit Summary   1/24/2018    Tisha Arias    MRN: [id number]           Patient Information     Date Of Birth          1960        Visit Information        Provider Department      1/24/2018 10:30 AM Selina Elizondo PA [facility]        Today's Diagnoses     High grade B-cell lymphoma (H)        Mast cell disease, systemic        Neoplasm related pain        Chest wall pain        Myofascial pain           Follow-ups after your visit        Your next 10 appointments already scheduled     Jan 29, 2018 10:15 AM CST   Masonic Lab Draw with [facility]ONIC LAB DRAW   [facility] Masonic Lab Draw ([facility])    [address] [place] [address]  [address]  [facility] [address]   [phone]            Jan 29, 2018 11:00 AM CST   Infusion 360 with UC ONCOLOGY INFUSION, UC 18 ATC   [facility] ([facility])    [address] [place] [address]  [address]  [facility] [address]   [phone]            Feb 01, 2018  8:30 AM CST   Masonic Lab Draw with [facility]ON[facility] LAB DRAW   [facility] Mason[facility] Lab Draw ([facility])    [address] [place] [address]  [address]  [facility] [address]   [phone]            Feb 05, 2018  8:30 AM CST   Masonic Lab Draw with [facility] LAB DRAW   [facility] Mason[facility] Lab Draw ([facility])    [address] [place] [address]  [address]  [facility] [address]   [phone]            Feb 08, 2018  8:30 AM CST   [facility] Draw with [facility] DRAW   [facility]    [address] [place] [address]  [address]  [facility] [address]   [phone]            Feb 27, 2018  6:45 AM CST   (Arrive by 6:30 AM)   PET ONCOLOGY WHOLE BODY with UUPET1   [facility] PET CT ([facility])    [address] [facility] [address]   [phone]           Tell your doctor:   If  there is any chance you may be pregnant or if you are breastfeeding.   If you have problems lying in small spaces (claustrophobia). If you do, your doctor may give you medicine to help you relax. If you have diabetes:   Have your exam early in the morning. Your blood glucose will go up as the day goes by.   Your glucose level must be 180 or less at the start of the exam. Please take any medicines you need to ensure this blood glucose level. 24 hours before your scan: Don t do any heavy exercise. (No jogging, aerobics or other workouts.) Exercise will make your pictures less accurate. 6 hours before your scan:   Stop all food and liquids (except water).   Do not chew gum or suck on mints.   If you need to take medicine with food, you may take it with a few crackers.  Please call your Imaging Department at your exam site with any questions.            Feb 28, 2018  9:30 AM CST   (Arrive by 9:15 AM)   Return Visit with Edu Benitez MD   [facility] ([facility])    [address] [place] [address]  [address] [place] [id number] [phone]            Feb 28, 2018 10:15 AM CST   RETURN ONC with Garima Sauceda MD   [facility] Blood and Marrow Transplant ([facility])    [address] [place] [address]  [address] [place] [address] [phone]            May 21, 2018  4:20 PM CDT   (Arrive by 4:05 PM)   RETURN ENDOCRINE with Avelina Castro MD   [facility] Endocrinology ([facility])    [address] [place] [address] [phone]              Future tests that were ordered for you today     Open Future Orders        Priority Expected Expires Ordered    Basic metabolic panel Routine  1/24/2019 1/24/2018            Who to contact     If you have questions or need follow up information about today's clinic visit or your schedule please contact [facility] directly at  "[phone].  Normal or non-critical lab and imaging results will be communicated to you by [employer], letter or phone within 4 business days after the clinic has received the results. If you do not hear from us within 7 days, please contact the clinic through [employer] or phone. If you have a critical or abnormal lab result, we will notify you by phone as soon as possible.  Submit refill requests through [employer] or call your pharmacy and they will forward the refill request to us. Please allow 3 business days for your refill to be completed.          Additional Information About Your Visit        [employer] Information     [employer] gives you secure access to your electronic health record. If you see a primary care provider, you can also send messages to your care team and make appointments. If you have questions, please call your primary care clinic.  If you do not have a primary care provider, please call [phone] and they will assist you.        Care EveryWhere ID     This is your Care EveryWhere ID. This could be used by other organizations to access your [facility] medical records  [id number]        Your Vitals Were     Pulse Temperature Height Pulse Oximetry BMI (Body Mass Index)       85 98.2  F (36.8  C) (Oral) 1.651 m (5' 5\") 98% 31.67 kg/m2        Blood Pressure from Last 3 Encounters:   01/24/18 124/79   01/22/18 108/65   01/18/18 110/70    Weight from Last 3 Encounters:   01/24/18 86.3 kg (190 lb 4.8 oz)   01/22/18 87.2 kg (192 lb 3.9 oz)   01/18/18 88.2 kg (194 lb 8 oz)              We Performed the Following     CBC with platelets differential          Today's Medication Changes          These changes are accurate as of 1/24/18 11:32 AM.  If you have any questions, ask your nurse or doctor.               These medicines have changed or have updated prescriptions.        Dose/Directions    calcitRIOL 0.25 MCG capsule   Commonly known as:  ROCALTROL   This may have changed:    - how much to take  - how to " take this  - when to take this  - additional instructions   Used for:  Postsurgical hypothyroidism, Papillary carcinoma, follicular variant (H), Metastasis to cervical lymph node (H)        TAKE 2 CAPSULES BY MOUTH EVERY MORNING AND TAKE 1 CAPSULE BY MOUTH EVERY NIGHT AT BEDTIME   Quantity:  90 capsule   Refills:  0       cyclobenzaprine 10 MG tablet   Commonly known as:  FLEXERIL   This may have changed:  when to take this   Used for:  High grade B-cell lymphoma (H)        Dose:  10 mg   Take 1 tablet (10 mg) by mouth 3 times daily as needed   Quantity:  30 tablet   Refills:  0       diazepam 5 MG tablet   Commonly known as:  VALIUM   This may have changed:    - when to take this  - reasons to take this   Used for:  Chest wall pain   Changed by:  Selina Elizondo PA        Dose:  5 mg   Take 1 tablet (5 mg) by mouth 3 times daily For muscle spasms   Refills:  0       levothyroxine 112 MCG tablet   Commonly known as:  SYNTHROID/LEVOTHROID   This may have changed:    - how much to take  - additional instructions   Used for:  Postsurgical hypothyroidism, Papillary carcinoma, follicular variant (H), Postsurgical hypoparathyroidism (H), Thyroid cancer (H)        Mon-Sat: (2 tabs daily) Sunday: 3 tabs   Quantity:  189 tablet   Refills:  3       methocarbamol 750 MG tablet   Commonly known as:  ROBAXIN   This may have changed:    - when to take this  - reasons to take this   Used for:  Myofascial pain   Changed by:  Selina Elizondo PA        Dose:  750 mg   Take 1 tablet (750 mg) by mouth 4 times daily . Ok to take a 5th Robaxin on very severe days.   Refills:  0       ondansetron 8 MG ODT tab   Commonly known as:  ZOFRAN-ODT   This may have changed:  when to take this   Used for:  High grade B-cell lymphoma (H), Nausea and vomiting, intractability of vomiting not specified, unspecified vomiting type        Dose:  8 mg   Take 1 tablet (8 mg) by mouth every 8 hours as needed   Quantity:  30 tablet   Refills:  1        oxyCODONE IR 30 MG tablet   Commonly known as:  ROXICODONE   This may have changed:  when to take this   Used for:  High grade B-cell lymphoma (H)        Dose:  30 mg   Start taking on:  1/27/2018   Take 1 tablet (30 mg) by mouth every 4 hours as needed for moderate to severe pain   Quantity:  30 tablet   Refills:  0            Where to get your medicines      These medications were sent to [id number] - [place], [place] - [address] [place]  [address] [facility]. [place] [address]     Phone:  [phone]     cromolyn sodium 5.2 MG/ACT Aers Inhaler    [facility] Chew    diclofenac 1 % Gel topical gel    lidocaine-prilocaine cream                Primary Care Provider Office Phone # Fax #    Shahla Crawley [phone]       [address] [place] [address]  [facility] [address]        Equal Access to Services     [facility] Center[facility]IN AH: Hadii aad ku hadasho Soomaali, waaxda luqadaha, qaybta kaalmada adeegyada, waxay idiin hayaan cahnell yoder . So [facility] [phone].    ATENCIÓN: Si habla español, tiene a stiles disposición servicios gratuitos de asistencia lingüística. Oneil al [phone].    We comply with applicable federal civil rights laws and [place] laws. We do not discriminate on the basis of race, color, national origin, age, disability, sex, sexual orientation, or gender identity.            Thank you!     Thank you for choosing [facility]  for your care. Our goal is always to provide you with excellent care. Hearing back from our patients is one way we can continue to improve our services. Please take a few minutes to complete the written survey that you may receive in the mail after your visit with us. Thank you!             Your Updated Medication List - Protect others around you: Learn how to safely use, store and throw away your medicines at www.disposemymeds.org.          This list is accurate as of 1/24/18 11:32 AM.  Always use your most  recent med list.                   Brand Name Dispense Instructions for use Diagnosis    acetaminophen 325 MG tablet    TYLENOL    100 tablet    Take 2 tablets (650 mg) by mouth every 4 hours as needed for mild pain or fever    High grade B-cell lymphoma (H)       acyclovir 400 MG tablet    ZOVIRAX    60 tablet    Take 1 tablet (400 mg) by mouth 2 times daily    High grade B-cell lymphoma (H)       aluminum chloride 20 % external solution    DRYSOL    60 mL    Apply topically At Bedtime    Rash, Intertrigo       AZMACORT IN      Inhale 2 puffs into the lungs as needed        bisacodyl 5 MG EC tablet     30 tablet    Take 3 tablets (15 mg) by mouth daily as needed for constipation    Constipation, unspecified constipation type       calcitRIOL 0.25 MCG capsule    ROCALTROL    90 capsule    TAKE 2 CAPSULES BY MOUTH EVERY MORNING AND TAKE 1 CAPSULE BY MOUTH EVERY NIGHT AT BEDTIME    Postsurgical hypothyroidism, Papillary carcinoma, follicular variant (H), Metastasis to cervical lymph node (H)       calcium citrate-vitamin D 315-250 MG-UNIT Tabs per tablet    CALCIUM CITRATE +D    20 tablet    Take 2 tablets by mouth twice a week    High grade B-cell lymphoma (H), Hypocalcemia       celecoxib 200 MG capsule    celeBREX    56 capsule    Take 1 capsule (200 mg) by mouth 2 times daily    Chest wall pain       cetirizine 10 MG tablet    ZYRTEC ALLERGY    90 tablet    Take 1 tablet (10 mg) by mouth 3 times daily On hold for lab test.    High grade B-cell lymphoma (H)       COMPOUND CONTAINING CONTROLLED SUBSTANCE - PHARMACY TO MIX COMPOUNDED MEDICATION    CMPD RX    120 g    Apply small amount to affected area two times daily.    Neoplasm related pain       cromolyn 4 % ophthalmic solution    OPTICROM    10 mL    Place 1 drop into both eyes 4 times daily    Idiopathic mast cell activation syndrome (H)       cromolyn sodium 5.2 MG/ACT Aers Inhaler    NASALCROM    1 Bottle    SPRAY ONE SPRAY( 1 ML) IN NOSTRIL DAILY    Mast  cell disease, systemic       [facility] [employer] Chew     120 tablet    Take 5 g by mouth daily (2 gummy= 5 g =1 serving)    High grade B-cell lymphoma (H)       cyclobenzaprine 10 MG tablet    FLEXERIL    30 tablet    Take 1 tablet (10 mg) by mouth 3 times daily as needed    High grade B-cell lymphoma (H)       dexamethasone 4 MG tablet    DECADRON    4 tablet    Take 2 tablets (8 mg) by mouth daily for 2 days Please take on 1/23 and 1/24.    High grade B-cell lymphoma (H)       diazepam 5 MG tablet    VALIUM     Take 1 tablet (5 mg) by mouth 3 times daily For muscle spasms    Chest wall pain       diclofenac 1 % Gel topical gel    VOLTAREN    100 g    Apply affected area two times daily PRN using enclosed dosing card.    Myofascial pain       EPINEPHrine 0.3 MG/0.3ML injection 2-pack    EPIPEN 2-CARIDAD    0.6 mL    Inject 0.3 mLs (0.3 mg) into the muscle once as needed for anaphylaxis        fluconazole 200 MG tablet    DIFLUCAN    30 tablet    Take 1 tablet (200 mg) by mouth daily    High grade B-cell lymphoma (H)       furosemide 20 MG tablet    LASIX    60 tablet    Take 1 tablet (20 mg) by mouth 2 times daily    Localized edema       hydrochlorothiazide 12.5 MG capsule    MICROZIDE    30 capsule    Take 1 capsule (12.5 mg) by mouth daily    Hypocalcemia       levofloxacin 250 MG tablet    LEVAQUIN    30 tablet    Take 1 tablet (250 mg) by mouth daily    High grade B-cell lymphoma (H)       levothyroxine 112 MCG tablet    SYNTHROID/LEVOTHROID    189 tablet    Mon-Sat: (2 tabs daily) Sunday: 3 tabs    Postsurgical hypothyroidism, Papillary carcinoma, follicular variant (H), Postsurgical hypoparathyroidism (H), Thyroid cancer (H)       lidocaine 5 % ointment    XYLOCAINE    350 g    Apply quarter size amount to chest and back up to 3 times daily as needed for pain.    Chest wall pain       lidocaine visc 2% 2.5mL/5mL & maalox/mylanta w/ simeth 2.5mL/5mL & diphenhydrAMINE 5mg/5mL Susp suspension    MAGIC  [facility]    360 mL    Swish and spit 10 mLs in mouth every 6 hours as needed for mouth sores    Stomatitis and mucositis, High grade B-cell lymphoma (H)       lidocaine-prilocaine cream    EMLA    30 g    Apply topically as needed (port access pain.)    Neoplasm related pain, Chest wall pain       methocarbamol 750 MG tablet    ROBAXIN     Take 1 tablet (750 mg) by mouth 4 times daily . Ok to take a 5th Robaxin on very severe days.    Myofascial pain       montelukast 10 MG tablet    SINGULAIR    60 tablet    Take 2 tablets (20 mg) by mouth 2 times daily    Idiopathic mast cell activation syndrome (H)       morphine 30 MG 12 hr tablet   Start taking on:  1/27/2018    MS CONTIN    120 tablet    Take 1 tablet (30 mg) by mouth every 8 hours . Take an addition pill at night such that your evening dose is 60mg    Neoplasm related pain       ondansetron 8 MG ODT tab    ZOFRAN-ODT    30 tablet    Take 1 tablet (8 mg) by mouth every 8 hours as needed    High grade B-cell lymphoma (H), Nausea and vomiting, intractability of vomiting not specified, unspecified vomiting type       * order for DME     2 Units    Equipment being ordered: compression stockings. 20-30 mm or 30 - 40 mm as patient can tolerate. Physical therapy to determine if they should be above or below the knee.    Venous stasis       * order for DME     2 Device    Equipment being ordered: compression bra    Malignant neoplasm of right female breast, unspecified site of breast       * order for DME     1 Units    Compression stockings, medium grade, please measure and fit. Please dispense a pair.    Peripheral edema       oxyCODONE IR 30 MG tablet   Start taking on:  1/27/2018    ROXICODONE    30 tablet    Take 1 tablet (30 mg) by mouth every 4 hours as needed for moderate to severe pain    High grade B-cell lymphoma (H)       polyethylene glycol powder    MIRALAX    510 g    Take 17 g (1 capful) by mouth daily    Acute constipation       potassium  chloride SA 20 MEQ CR tablet    KLOR-CON    30 tablet    Take 1 tablet (20 mEq) by mouth 2 times daily    Hypokalemia       PROBIOTIC DAILY PO      Take 1 capsule by mouth daily Lacto acid bifidobacterium    Breast cancer, unspecified laterality, Thyroid cancer (H), Chronic arthralgias of knees and hips, unspecified laterality       prochlorperazine 10 MG tablet    COMPAZINE     Take 10 mg by mouth as needed        ranitidine 75 MG tablet    ZANTAC    90 tablet    Take 1 tablet (75 mg) by mouth 3 times daily    Mast cell disease, systemic       senna-docusate 8.6-50 MG per tablet    SENOKOT-S;PERICOLACE    60 tablet    Take 1-2 tablets by mouth 2 times daily as needed for constipation    Acute constipation       SUMAtriptan 50 MG tablet    IMITREX     Take 50 mg by mouth as needed        triamcinolone 0.025 % ointment    KENALOG     Apply topically as needed        * UNABLE TO FIND      3 tablets 3 times daily MEDICATION NAME: calcium D-Glucarate  3 caps contain 180mg of elemental calcium.        * UNABLE TO FIND      Take 2 capsules by mouth 3 times daily Muscle Mag. 2 caps contain B1 20mg, B2 20mg, B6 10mg, magesium 20mg, manganese 2mg.        * UNABLE TO FIND      2 tablets 3 times daily MEDICATION NAME: Digestzymes    Thyroid cancer (H), Postsurgical hypothyroidism, Postsurgical hypoparathyroidism (H)       * UNABLE TO FIND      1 tablet daily MEDICATION NAME: Pure Encapsulations    Thyroid cancer (H), Postsurgical hypothyroidism, Postsurgical hypoparathyroidism (H)       VENTOLIN  (90 BASE) MCG/ACT Inhaler   Generic drug:  albuterol     18 g    INHALE 2 PUFFS INTO THE LUNGS EVERY 4 HOURS AS NEEDED FOR SHORTNESS OF BREATH OR DIFFICULT BREATHING OR WHEEZING    Mild intermittent asthma without complication       vitamin D3 2000 UNITS Caps     60 capsule    Take 5,000 Units by mouth daily Takes 2 tabs daily    Thyroid cancer (H), Postsurgical hypothyroidism, Papillary carcinoma, follicular variant (H),  Postsurgical hypoparathyroidism (H)       * Notice:  This list has 7 medication(s) that are the same as other medications prescribed for you. Read the directions carefully, and ask your doctor or other care provider to review them with you.

## 2018-01-25 NOTE — PROGRESS NOTES
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 She is currently undergoing treatment with DA-R-EPOCH. Her past medical history is significant for breast cancer in 2011 s/p bilateral mastectomies and BENJIE/BSO up until recently on Tamoxifen, papillary thyroid cancer s/p total thyroidectomy, chronic chest wall pain, and asthma. Briefly, her oncologic history is as follows:     She presented in 8/2017 with dramatically worse chest and back pain. CT 9/1/17 showed lytic lesion right 10th rib extending to right T9-10 neural foramen with vertebral body invasion. There was associated conglomerate of lymphadenopathy around the mid T-spine. She had a CT guided biopsy showing B-cell high grade lymphoma. Pathology showed \"The morphology shows a population of large cells, diffuse lymphoid infiltrate. The cells are atypical with abundant mitotic and apoptotic activity and single cell necrosis. Tumor is CD45 and CD79a positive and focally weakly CD30 positive. The other stains revealed positivity for CD20, BCL6, BCL2 and MUM1, and CD10 and CD21 negative. The CD5 and CD3 highlighted background T-cells.  MYC expression was equivocal with approximately 40% nuclei staining.  Ki-67 proliferative index is greater than 90-95%. The immunohistochemistry is suggestive of non-GCB immunophenotype of diffuse large B-cell lymphoma. The cytogenetics did not show rearrangement of the BCL2 or c-MYC. There is the presence of BCL6 rearrangement in 78% of cells and gains of MYC and BCL2, but no amplifications.\"      Staging LP and BMBx were negative for lymphoma. She started DA-REPOCH 10/6/17. She did well with chemo other than rigors during CIVI. D/c 10/11/17. Given Neulasta 10/12/17. Post cycle 1 complicated by mucositis and worsening of chronic LE " peripheral edema. She began cycle 2 on 10/27/17. Due to a rise in her LD and uric acid levels on that admission day, she underwent a CT scan which showed response to therapy with improvement in her mediastinal lymphadenopathy and right chest wall mass. She had a CR following 4 cycles on PET/CT 12/21.     Interval History:  [place] states she has felt mostly fatigue since hospital discharge and does not seem to recover in between cycles. She has ongoing edema in bilateral extremities but she is almost at her prior to admission weight and is wearing compression stockings. She is taking lasix 20 mg BID and her potassium was low prior to discharge and she is now on 40 mEqs daily. She denies any lightheadedness, but does feel a little weaker going up stairs, perhaps because her legs are heavier. She has mild dyspnea with this exertion, but no chest pain or SOB at rest. She has fallen at home before, perhaps twice since diagnosis. She is unsure at this time whether she would like to try PT at our cancer rehab program, but is agreeable to a referral if she reconsiders. She denies any fever, chills, cough, sore throat. She has some clear, chronic rhinorrhea but no sinus pressure or pain. She is on Acyclovir BID. She had some nausea with this round of chemo, but no vomiting. She has anti-emetic medication if needed. No constipation since hospital discharge.    Review of Systems:  Patient denies any of the following except if noted above: diarrhea, urinary concerns, rashes or skin lesions, bleeding or bruising issues.    Current Outpatient Prescriptions   Medication Sig Dispense Refill     cromolyn sodium (NASALCROM) 5.2 MG/ACT AERS Inhaler SPRAY ONE SPRAY( 1 ML) IN NOSTRIL DAILY 1 Bottle 6     lidocaine-prilocaine (EMLA) cream Apply topically as needed (port access pain.) 30 g 1     diazepam (VALIUM) 5 MG tablet Take 1 tablet (5 mg) by mouth 3 times daily For muscle spasms       [employer] FIBER GUMMY BEARS CHILDREN CHEW Take 5  g by mouth daily (2 gummy= 5 g =1 serving) 120 tablet 3     methocarbamol (ROBAXIN) 750 MG tablet Take 1 tablet (750 mg) by mouth 4 times daily . Ok to take a 5th Robaxin on very severe days.       diclofenac (VOLTAREN) 1 % GEL topical gel Apply affected area two times daily PRN using enclosed dosing card. 100 g 0     [START ON 1/27/2018] oxyCODONE IR (ROXICODONE) 30 MG tablet Take 1 tablet (30 mg) by mouth every 4 hours as needed for moderate to severe pain (Patient taking differently: Take 30 mg by mouth every 4 hours ) 30 tablet 0     celecoxib (CELEBREX) 200 MG capsule Take 1 capsule (200 mg) by mouth 2 times daily 56 capsule 0     ondansetron (ZOFRAN-ODT) 8 MG ODT tab Take 1 tablet (8 mg) by mouth every 8 hours as needed (Patient taking differently: Take 8 mg by mouth every 8 hours ) 30 tablet 1     fluconazole (DIFLUCAN) 200 MG tablet Take 1 tablet (200 mg) by mouth daily 30 tablet 0     cetirizine (ZYRTEC ALLERGY) 10 MG tablet Take 1 tablet (10 mg) by mouth 3 times daily On hold for lab test. 90 tablet 0     acyclovir (ZOVIRAX) 400 MG tablet Take 1 tablet (400 mg) by mouth 2 times daily 60 tablet 0     dexamethasone (DECADRON) 4 MG tablet Take 2 tablets (8 mg) by mouth daily for 2 days Please take on 1/23 and 1/24. 4 tablet 0     hydrochlorothiazide (MICROZIDE) 12.5 MG capsule Take 1 capsule (12.5 mg) by mouth daily 30 capsule 0     levofloxacin (LEVAQUIN) 250 MG tablet Take 1 tablet (250 mg) by mouth daily 30 tablet 0     calcium citrate-vitamin D (CALCIUM CITRATE +D) 315-250 MG-UNIT TABS per tablet Take 2 tablets by mouth twice a week 20 tablet 0     cyclobenzaprine (FLEXERIL) 10 MG tablet Take 1 tablet (10 mg) by mouth 3 times daily as needed (Patient taking differently: Take 10 mg by mouth 3 times daily ) 30 tablet 0     ranitidine (ZANTAC) 75 MG tablet Take 1 tablet (75 mg) by mouth 3 times daily 90 tablet 0     Cholecalciferol (VITAMIN D3) 2000 UNITS CAPS Take 5,000 Units by mouth daily Takes 2 tabs  daily 60 capsule 0     furosemide (LASIX) 20 MG tablet Take 1 tablet (20 mg) by mouth 2 times daily 60 tablet 0     calcitRIOL (ROCALTROL) 0.25 MCG capsule TAKE 2 CAPSULES BY MOUTH EVERY MORNING AND TAKE 1 CAPSULE BY MOUTH EVERY NIGHT AT BEDTIME (Patient taking differently: Take 0.25 mcg by mouth 2 times daily TAKE 2 CAPSULES BY MOUTH EVERY MORNING AND TAKE 1 CAPSULE BY MOUTH EVERY NIGHT AT NOON.) 90 capsule 0     cromolyn (OPTICROM) 4 % ophthalmic solution Place 1 drop into both eyes 4 times daily 10 mL 0     montelukast (SINGULAIR) 10 MG tablet Take 2 tablets (20 mg) by mouth 2 times daily 60 tablet 0     potassium chloride SA (KLOR-CON) 20 MEQ CR tablet Take 1 tablet (20 mEq) by mouth 2 times daily 30 tablet 0     [START ON 1/27/2018] morphine (MS CONTIN) 30 MG 12 hr tablet Take 1 tablet (30 mg) by mouth every 8 hours . Take an addition pill at night such that your evening dose is 60mg 120 tablet 0     magic mouthwash suspension (diphenhydrAMINE, lidocaine, aluminum-magnesium & simethicone) Swish and spit 10 mLs in mouth every 6 hours as needed for mouth sores 360 mL 1     prochlorperazine (COMPAZINE) 10 MG tablet Take 10 mg by mouth as needed  3     SUMAtriptan (IMITREX) 50 MG tablet Take 50 mg by mouth as needed  3     acetaminophen (TYLENOL) 325 MG tablet Take 2 tablets (650 mg) by mouth every 4 hours as needed for mild pain or fever 100 tablet      triamcinolone (KENALOG) 0.025 % ointment Apply topically as needed  3     lidocaine (XYLOCAINE) 5 % ointment Apply quarter size amount to chest and back up to 3 times daily as needed for pain. 350 g 1     bisacodyl (DULCOLAX) 5 MG EC tablet Take 3 tablets (15 mg) by mouth daily as needed for constipation 30 tablet 0     polyethylene glycol (MIRALAX) powder Take 17 g (1 capful) by mouth daily 510 g 0     senna-docusate (SENOKOT-S;PERICOLACE) 8.6-50 MG per tablet Take 1-2 tablets by mouth 2 times daily as needed for constipation 60 tablet 0     UNABLE TO FIND Take 2  capsules by mouth 3 times daily Muscle Mag. 2 caps contain B1 20mg, B2 20mg, B6 10mg, magesium 20mg, manganese 2mg.       order for DME Compression stockings, medium grade, please measure and fit. Please dispense a pair. 1 Units 0     levothyroxine (SYNTHROID/LEVOTHROID) 112 MCG tablet Mon-Sat: (2 tabs daily) Sunday: 3 tabs (Patient taking differently: 112 mcg Mon-Sat: (2 tabs daily) Sunday: 3 tabs) 189 tablet 3     VENTOLIN  (90 BASE) MCG/ACT Inhaler INHALE 2 PUFFS INTO THE LUNGS EVERY 4 HOURS AS NEEDED FOR SHORTNESS OF BREATH OR DIFFICULT BREATHING OR WHEEZING 18 g 3     order for DME Equipment being ordered: compression stockings. 20-30 mm or 30 - 40 mm as patient can tolerate. Physical therapy to determine if they should be above or below the knee. 2 Units 4     order for DME Equipment being ordered: compression bra 2 Device 1     aluminum chloride (DRYSOL) 20 % external solution Apply topically At Bedtime 60 mL 3     UNABLE TO FIND 3 tablets 3 times daily MEDICATION NAME: calcium D-Glucarate   3 caps contain 180mg of elemental calcium.       Triamcinolone Acetonide (AZMACORT IN) Inhale 2 puffs into the lungs as needed       UNABLE TO FIND 2 tablets 3 times daily MEDICATION NAME: Digestzymes        UNABLE TO FIND 1 tablet daily MEDICATION NAME: Pure Encapsulations       Probiotic Product (PROBIOTIC DAILY PO) Take 1 capsule by mouth daily Lacto acid bifidobacterium       [DISCONTINUED] lidocaine-prilocaine (EMLA) cream Apply topically as needed (port access pain.) 30 g 1     COMPOUND CONTAINING CONTROLLED SUBSTANCE (CMPD RX) - PHARMACY TO MIX COMPOUNDED MEDICATION Apply small amount to affected area two times daily. (Patient not taking: Reported on 1/24/2018) 120 g 6     EPINEPHrine (EPIPEN 2-CARIDAD) 0.3 MG/0.3ML injection Inject 0.3 mLs (0.3 mg) into the muscle once as needed for anaphylaxis (Patient not taking: Reported on 1/24/2018) 0.6 mL 3       Physical Examination:  General: The patient is a pleasant  "female in no acute distress.  /79 (BP Location: Left arm, Patient Position: Chair, Cuff Size: Adult Regular)  Pulse 85  Temp 98.2  F (36.8  C) (Oral)  Ht 1.651 m (5' 5\")  Wt 86.3 kg (190 lb 4.8 oz)  SpO2 98%  BMI 31.67 kg/m2  Wt Readings from Last 10 Encounters:   01/24/18 86.3 kg (190 lb 4.8 oz)   01/22/18 87.2 kg (192 lb 3.9 oz)   01/18/18 88.2 kg (194 lb 8 oz)   01/15/18 86.1 kg (189 lb 14.4 oz)   01/09/18 82.3 kg (181 lb 8 oz)   01/03/18 85.7 kg (189 lb)   01/01/18 87.5 kg (193 lb)   12/26/17 85.5 kg (188 lb 7.9 oz)   12/19/17 83.8 kg (184 lb 11.2 oz)   12/15/17 84.9 kg (187 lb 3.2 oz)     HEENT: EOMI, PERRL. Sclerae are anicteric. Oral mucosa is pink and moist with no lesions or thrush.   Lymph: Neck is supple with no lymphadenopathy in the cervical or supraclavicular areas.   Heart: Regular rate and rhythm.   Lungs: Clear to auscultation bilaterally.   Abdomen: Bowel sounds present, soft, nontender with no palpable hepatosplenomegaly or masses.   Extremities: Wearing compression stockings.   Neuro: Cranial nerves II through XII are grossly intact.  Skin: No rashes, petechiae, or bruising noted on exposed skin.    Laboratory Data:  Results for orders placed or performed in visit on 01/24/18 (from the past 24 hour(s))   CBC with platelets differential   Result Value Ref Range    WBC 11.1 (H) 4.0 - 11.0 10e9/L    RBC Count 2.80 (L) 3.8 - 5.2 10e12/L    Hemoglobin 9.4 (L) 11.7 - 15.7 g/dL    Hematocrit 27.7 (L) 35.0 - 47.0 %    MCV 99 78 - 100 fl    MCH 33.6 (H) 26.5 - 33.0 pg    MCHC 33.9 31.5 - 36.5 g/dL    RDW 16.8 (H) 10.0 - 15.0 %    Platelet Count 241 150 - 450 10e9/L    Diff Method Automated Method     % Neutrophils 98.4 %    % Lymphocytes 0.5 %    % Monocytes 0.1 %    % Eosinophils 0.0 %    % Basophils 0.2 %    % Immature Granulocytes 0.8 %    Nucleated RBCs 0 0 /100    Absolute Neutrophil 10.9 (H) 1.6 - 8.3 10e9/L    Absolute Lymphocytes 0.1 (L) 0.8 - 5.3 10e9/L    Absolute Monocytes 0.0 0.0 - " "1.3 10e9/L    Absolute Eosinophils 0.0 0.0 - 0.7 10e9/L    Absolute Basophils 0.0 0.0 - 0.2 10e9/L    Abs Immature Granulocytes 0.1 0 - 0.4 10e9/L    Absolute Nucleated RBC 0.0    Basic metabolic panel   Result Value Ref Range    Sodium 139 133 - 144 mmol/L    Potassium 3.8 3.4 - 5.3 mmol/L    Chloride 102 94 - 109 mmol/L    Carbon Dioxide 30 20 - 32 mmol/L    Anion Gap 7 3 - 14 mmol/L    Glucose 124 (H) 70 - 99 mg/dL    Urea Nitrogen 23 7 - 30 mg/dL    Creatinine 0.63 0.52 - 1.04 mg/dL    GFR Estimate >90 >60 mL/min/1.7m2    GFR Estimate If Black >90 >60 mL/min/1.7m2    Calcium 7.7 (L) 8.5 - 10.1 mg/dL     *Note: Due to a large number of results and/or encounters for the requested time period, some results have not been displayed. A complete set of results can be found in Results Review.         Assessment and Plan:     1. DLBCL, \"double-hit\"-like, currently on treatment with DA-R-EPOCH  Now s/p 6 cycles with CR on PET/CT after 4.   --Neulasta today  --She is set up for labs and transfusion support on 1/29. Labs on 2/1, 2/5 and 2/8.   --PET/CT on 2/27 and Dr. Sauceda on 2/28    2. Edema: Peripheral edema improved since discharge. Continue Lasix and HCTZ. Wearing compression stockings. On Potassium 40mEqs. K WNL today     3. Heme: Hgb 9.4, WBC 11.1, Platelets 241. Continue twice weekly checks upon d/c and transfuse if Hgb<8 and Plt<10k.      4. ID: No active concerns.   Continue ppx ACV 400mg BID. Hold Levaquin 250mg daily and fluconazole 200mg daily until neutropenic.      5. History of stage 1, ER/WA+, HER2- breast cancer s/p bilateral mastectomies and BENJIE/BSO  Follows with Dr. Crawley. Oncotype recurrence score 11%. Was initially on Arimidex ([id number]) but changed to Tamoxifen due to arthralgias. Tamoxifen held since 10/5 and continue to hold with chemotherapy. Last f/u Dr. Crawley was on 11/27/17.     6. History of papillary thyroid cancer, s/p total thyroidectomy  Follows with Dr. Castro. Has post surgical " hypothyroidism. Continue levothyroxine and calcitriol as prescribed. Neck u/s on 11/2 shows no evidence of disease.     7. History of Rachael en Y gastric bypass  Continue supplements (calcium citrate-vitamin D, vitamin D3, magnesium citrate). Also has iron deficiency anemia likely from poor absorption and per Dr. Sauceda, she should continue 325mg PO ferrous sulfate TID.      8. Chronic chest wall pain s/p mastectomies  Follows with Dr. Benitez. Palliative care to continue to prescribe pain medications. Taking MS Contin 30/30/60 mg tid and oxycodone prn, currently 15 mg qid for breakthrough pain, and celebrex.      9. Constipation.  Under control with Colace, fiber tabs, and MiraLax when needed.     Selina Elizondo PA-C  [facility]  [address], [place] [id number] [phone]

## 2018-01-29 ENCOUNTER — APPOINTMENT (OUTPATIENT)
Dept: LAB | Facility: CLINIC | Age: 58
End: 2018-01-29
Payer: COMMERCIAL

## 2018-01-29 ENCOUNTER — RECORDS - HEALTHEAST (OUTPATIENT)
Dept: ADMINISTRATIVE | Facility: OTHER | Age: 58
End: 2018-01-29

## 2018-01-29 ENCOUNTER — ONCOLOGY VISIT (OUTPATIENT)
Dept: ONCOLOGY | Facility: CLINIC | Age: 58
End: 2018-01-29
Attending: INTERNAL MEDICINE
Payer: COMMERCIAL

## 2018-01-29 ENCOUNTER — RADIANT APPOINTMENT (OUTPATIENT)
Dept: CT IMAGING | Facility: CLINIC | Age: 58
End: 2018-01-29
Attending: PHYSICIAN ASSISTANT
Payer: COMMERCIAL

## 2018-01-29 ENCOUNTER — INFUSION THERAPY VISIT (OUTPATIENT)
Dept: ONCOLOGY | Facility: CLINIC | Age: 58
End: 2018-01-29
Payer: COMMERCIAL

## 2018-01-29 ENCOUNTER — CARE COORDINATION (OUTPATIENT)
Dept: ONCOLOGY | Facility: CLINIC | Age: 58
End: 2018-01-29

## 2018-01-29 VITALS
HEART RATE: 122 BPM | SYSTOLIC BLOOD PRESSURE: 103 MMHG | DIASTOLIC BLOOD PRESSURE: 71 MMHG | TEMPERATURE: 97.7 F | RESPIRATION RATE: 16 BRPM | BODY MASS INDEX: 29.77 KG/M2 | WEIGHT: 178.9 LBS | OXYGEN SATURATION: 94 %

## 2018-01-29 DIAGNOSIS — D69.6 THROMBOCYTOPENIA (H): ICD-10-CM

## 2018-01-29 DIAGNOSIS — R42 DIZZINESS: ICD-10-CM

## 2018-01-29 DIAGNOSIS — C83.33 DIFFUSE LARGE B-CELL LYMPHOMA OF INTRA-ABDOMINAL LYMPH NODES (H): ICD-10-CM

## 2018-01-29 DIAGNOSIS — C83.30 DIFFUSE LARGE B-CELL LYMPHOMA, UNSPECIFIED BODY REGION (H): Primary | ICD-10-CM

## 2018-01-29 DIAGNOSIS — C85.10 HIGH GRADE B-CELL LYMPHOMA (H): Primary | ICD-10-CM

## 2018-01-29 LAB
ANION GAP SERPL CALCULATED.3IONS-SCNC: 6 MMOL/L (ref 3–14)
BUN SERPL-MCNC: 21 MG/DL (ref 7–30)
CALCIUM SERPL-MCNC: 8.2 MG/DL (ref 8.5–10.1)
CHLORIDE SERPL-SCNC: 98 MMOL/L (ref 94–109)
CO2 SERPL-SCNC: 29 MMOL/L (ref 20–32)
CREAT SERPL-MCNC: 0.66 MG/DL (ref 0.52–1.04)
DIFFERENTIAL METHOD BLD: ABNORMAL
ERYTHROCYTE [DISTWIDTH] IN BLOOD BY AUTOMATED COUNT: 15.6 % (ref 10–15)
GFR SERPL CREATININE-BSD FRML MDRD: >90 ML/MIN/1.7M2
GLUCOSE SERPL-MCNC: 131 MG/DL (ref 70–99)
HCT VFR BLD AUTO: 28.1 % (ref 35–47)
HGB BLD-MCNC: 9.5 G/DL (ref 11.7–15.7)
MCH RBC QN AUTO: 33.5 PG (ref 26.5–33)
MCHC RBC AUTO-ENTMCNC: 33.8 G/DL (ref 31.5–36.5)
MCV RBC AUTO: 99 FL (ref 78–100)
PLATELET # BLD AUTO: 39 10E9/L (ref 150–450)
POTASSIUM SERPL-SCNC: 4.1 MMOL/L (ref 3.4–5.3)
RBC # BLD AUTO: 2.84 10E12/L (ref 3.8–5.2)
SODIUM SERPL-SCNC: 133 MMOL/L (ref 133–144)
WBC # BLD AUTO: 0.1 10E9/L (ref 4–11)

## 2018-01-29 PROCEDURE — 86901 BLOOD TYPING SEROLOGIC RH(D): CPT

## 2018-01-29 PROCEDURE — G0463 HOSPITAL OUTPT CLINIC VISIT: HCPCS

## 2018-01-29 PROCEDURE — 36591 DRAW BLOOD OFF VENOUS DEVICE: CPT

## 2018-01-29 PROCEDURE — 85027 COMPLETE CBC AUTOMATED: CPT | Performed by: PHYSICIAN ASSISTANT

## 2018-01-29 PROCEDURE — 25000128 H RX IP 250 OP 636: Mod: ZF

## 2018-01-29 PROCEDURE — 80048 BASIC METABOLIC PNL TOTAL CA: CPT | Performed by: PHYSICIAN ASSISTANT

## 2018-01-29 PROCEDURE — 99215 OFFICE O/P EST HI 40 MIN: CPT | Mod: ZP | Performed by: PHYSICIAN ASSISTANT

## 2018-01-29 PROCEDURE — 86923 COMPATIBILITY TEST ELECTRIC: CPT

## 2018-01-29 PROCEDURE — 86900 BLOOD TYPING SEROLOGIC ABO: CPT

## 2018-01-29 PROCEDURE — 86850 RBC ANTIBODY SCREEN: CPT

## 2018-01-29 RX ORDER — HEPARIN SODIUM (PORCINE) LOCK FLUSH IV SOLN 100 UNIT/ML 100 UNIT/ML
5 SOLUTION INTRAVENOUS
Status: COMPLETED | OUTPATIENT
Start: 2018-01-29 | End: 2018-01-29

## 2018-01-29 RX ADMIN — SODIUM CHLORIDE, PRESERVATIVE FREE 5 ML: 5 INJECTION INTRAVENOUS at 10:56

## 2018-01-29 ASSESSMENT — PAIN SCALES - GENERAL: PAINLEVEL: SEVERE PAIN (6)

## 2018-01-29 NOTE — MR AVS SNAPSHOT
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     If it is after hours, weekends, or holidays, please call the main hospital  at  [phone] and ask to speak to the Oncology doctor on call.     If you are having any concerning symptoms or wish to speak to a provider before your next infusion visit, please call your care coordinator or triage to notify them so we can adequately serve you.     If you need a refill on a narcotic prescription or other medication, please call triage before your infusion appointment.           January 2018 Sunday Monday Tuesday Wednesday Thursday Friday Saturday        1     2     [facility] MASONIC LAB DRAW    9:15 AM   (15 min.)    [facility]     P [facility]C INFUSION 360    9:30 AM   (360 min.)    ONCOLOGY INFUSION   [facility] 3     [facility] DRAW    8:00 AM   (15 min.)    [facility]     [facility] INJECTION    8:45 AM   (30 min.)   Nurse,  Oncology Injection   [facility] 4     5     [facility]    9:30 AM   (15 min.)   [facility]   [facility] Masonic Lab Draw 6       7     8     9     [facility] LAB DRAW    7:15 AM   (15 min.)   [facility]   [facility] Masonic Lab Draw     [facility] RETURN    7:35 AM   (50 min.)   Rashida Jaffe PA-C   [facility] 10     [facility] MASONIC LAB DRAW    6:30 AM   (15 min.)   [facility] LAB DRAW   [facility] Masonic Lab Draw     [facility] ONC INFUSION 360    7:00 AM   (360 min.)   [facility] ONCOLOGY INFUSION   [facility] 11     12     [facility] LAB DRAW    8:30 AM   (15 min.)   [facility] LAB DRAW   [facility] Masonic Lab Draw 13       14     15     [facility] LAB DRAW    8:30 AM   (15 min.)   [facility] LAB DRAW   [facility] Masonic Lab Draw     [facility] ONC INFUSION 360    9:00 AM   (360 min.)    ONCOLOGY INFUSION   [facility]     [facility] MASONIC LAB DRAW   10:00 AM   (15 min.)   [facility] LAB DRAW   [facility] Masonic Lab Draw 16     17     18     [facility] MAS[facility] LAB DRAW    8:00 AM   (15 min.)   [facility] LAB DRAW   [facility] Masonic Lab Draw     [facility] RETURN    8:15 AM   (50 min.)   Rashida Jaffe PA-C [facility]     Admission    4:22 PM   Jessica Bowers MD   Unit 7D [facility]   (Discharge: 1/22/2018) 19     XR LP INTRATHECAL CHEMO ADMIN    1:05 PM   (60 min.)   [id number]   [facility]  Radiology 20       21     22     23     24     [facility] LAB DRAW    9:45 AM   (15 min.)   [facility] LAB DRAW   [facility] Masonic Lab Draw     [facility] RETURN   10:15 AM   (50 min.)   Selina Elizondo PA   [facility] 25     26     27       28     29     [facility] LAB DRAW   10:15 AM   (15 min.)   [facility] LAB DRAW   [facility] Masonic Lab Draw     [facility] ONC INFUSION 360   11:00 AM   (360 min.)    ONCOLOGY INFUSION   [facility] 30 31 February 2018 Sunday Monday Tuesday Wednesday Thursday Friday Saturday                       1     [facility] LAB DRAW    8:30 AM   (15  min.)    [facility]   [facility] Draw 2     3       4     5     [facility] MASONIC LAB DRAW    8:30 AM   (15 min.)   [facility]   [facility] 6     7     8     [facility] MASONIC LAB DRAW    8:30 AM   (15 min.)   [facility]RAW   [facility] 9     10       11     12     13     14     15     16     17       18     19     20     21     22     23     24       25     26     27     PET ONCOLOGY WHOLE BODY    6:30 AM   (45 min.)   UUPET1   [facility] PET CT 28     P RETURN    9:15 AM   (30 min.)   Edu Benitez MD   [facility]     [facility] ONC RETURN   10:15 AM   (30 min.)   Garima Sauceda MD   [facility] Blood and Marrow Transplant                             Lab Results:  Recent Results (from the past 12 hour(s))   CBC with platelets differential    Collection Time: 01/29/18 11:01 AM   Result Value Ref Range    WBC 0.1 (LL) 4.0 - 11.0 10e9/L    RBC Count 2.84 (L) 3.8 - 5.2 10e12/L    Hemoglobin 9.5 (L) 11.7 - 15.7 g/dL    Hematocrit 28.1 (L) 35.0 - 47.0 %    MCV 99 78 - 100 fl    MCH 33.5 (H) 26.5 - 33.0 pg    MCHC 33.8 31.5 - 36.5 g/dL    RDW 15.6 (H) 10.0 - 15.0 %    Platelet Count 39 (LL) 150 - 450 10e9/L    Diff Method WBC <0.5, Diff not done    ABO/Rh type and screen    Collection Time: 01/29/18 11:01 AM   Result Value Ref Range    ABO PENDING     Antibody Screen PENDING     Test Valid Only At          [facility]    Specimen Expires 02/01/2018       Continue taking Levaquin and Fluconazole. STOP taking Lasix.           Follow-ups after your visit        Your next 10 appointments already scheduled     Feb 01, 2018  8:30 AM [facility]   Masonic Lab Draw with  MASONIC LAB DRAW   [facility] Masonic Lab Draw ([facility])    [address] [place] [address]  [address] [place] [address] [phone]            Feb 05, 2018  8:30 AM [facility]   Masonic Lab Draw with  [facility]    [facility] Masonic Lab Draw ([facility])    [address]  [address]  [facility] [address]   [phone]            Feb 08, 2018  8:30 AM CST   Masonic Lab Draw with  MASONIC LAB DRAW   [facility] Masonic Lab Draw ([facility])    [address]  [address]  [facility] [address]   [phone]            Feb 27, 2018  6:45 AM CST   (Arrive by 6:30 AM)   PET ONCOLOGY WHOLE BODY with UUPET1   [facility] PET CT ([facility])    [address] [address]   [phone]           Tell your doctor:   If there is any chance you may be pregnant or if you are breastfeeding.   If you have problems lying in small spaces (claustrophobia). If you do, your doctor may give you medicine to help you relax. If you have diabetes:   Have your exam early in the morning. Your blood glucose will go up as the day goes by.   Your glucose level must be 180 or less at the start of the exam. Please take any medicines you need to ensure this blood glucose level.   If you are taking insulin in the morning take with breakfast by 6 am and schedule procedure between 12 and 2:15 pm.   If you are taking insulin at night take nightly dose, fast overnight, schedule procedure before 10 am.   If you take insulin both morning and night take morning dose by 6am and schedule procedure between 12 and 2:15 pm.   24 hours before your scan: Don t do any heavy exercise. (No jogging, aerobics or other workouts.) Exercise will make your pictures less accurate.  7 hours before your scan: Eat a low carb, high protein meal (Lean meats, seafood, beans, soy, low-fat dairy, eggs, nuts & seeds). 6 hours before your scan:   Stop all food and liquids (except water).   Do not chew gum or suck on mints.   If you need to take medicine with food, you may take it with a few crackers.  Please call your Imaging Department at your exam site  with any questions.            Feb 28, 2018  9:30 AM CST   (Arrive by 9:15 AM)   Return Visit with Edu Benitez MD   [facility]    [address] [place] [address]  [address]  [facility] [address] [phone]            Feb 28, 2018 10:15 AM CST   RETURN ONC with aGrima Sauceda MD   [facility] Blood and Marrow Transplant ([facility])    [address] [place] [address]  [address]  [facility] [address] [phone]            May 21, 2018  4:20 PM CDT   (Arrive by 4:05 PM)   RETURN ENDOCRINE with Avelina Castro MD   [facility] Endocrinology ([facility])    [address] [place] [address]  3rd Floor  [facility] [address] [phone]            Jun 11, 2018  4:00 PM CDT   (Arrive by 3:45 PM)   Return Visit with Shahla Crawley MD   [facility]    [address] [place] [address]  [address]  [facility] [address] [phone]              Future tests that were ordered for you today     Open Standing Orders        Priority Remaining Interval Expires Ordered    Red blood cell prepare order unit Routine 99/100 CONDITIONAL (SPECIFY) BLOOD  1/26/2018            Who to contact     If you have questions or need follow up information about today's clinic visit or your schedule please contact [facility] CANCER [facility] directly at [phone].  Normal or non-critical lab and imaging results will be communicated to you by MyChart, letter or phone within 4 business days after the clinic has received the results. If you do not hear from us within 7 days, please contact the clinic through MyChart or phone. If you have a critical or abnormal lab result, we will notify you by phone as soon as possible.  Submit refill requests through [employer] or call your pharmacy and they will forward the refill request to us. Please allow 3 business days for your refill to be  completed.          Additional Information About Your Visit        [employer] Information     [employer] gives you secure access to your electronic health record. If you see a primary care provider, you can also send messages to your care team and make appointments. If you have questions, please call your primary care clinic.  If you do not have a primary care provider, please call [phone] and they will assist you.        Care EveryWhere ID     This is your Care EveryWhere ID. This could be used by other organizations to access your [place] medical records  [id number]        Your Vitals Were     Pulse Temperature Respirations Pulse Oximetry BMI (Body Mass Index)       122 97.7  F (36.5  C) (Oral) 16 94% 29.77 kg/m2        Blood Pressure from Last 3 Encounters:   01/29/18 103/71   01/24/18 124/79   01/22/18 108/65    Weight from Last 3 Encounters:   01/29/18 81.1 kg (178 lb 14.4 oz)   01/24/18 86.3 kg (190 lb 4.8 oz)   01/22/18 87.2 kg (192 lb 3.9 oz)              We Performed the Following     ABO/Rh type and screen     Basic metabolic panel     CBC with platelets differential          Today's Medication Changes          These changes are accurate as of 1/29/18 12:03 PM.  If you have any questions, ask your nurse or doctor.               These medicines have changed or have updated prescriptions.        Dose/Directions    calcitRIOL 0.25 MCG capsule   Commonly known as:  ROCALTROL   This may have changed:    - how much to take  - how to take this  - when to take this  - additional instructions   Used for:  Postsurgical hypothyroidism, Papillary carcinoma, follicular variant (H), Metastasis to cervical lymph node (H)        TAKE 2 CAPSULES BY MOUTH EVERY MORNING AND TAKE 1 CAPSULE BY MOUTH EVERY NIGHT AT BEDTIME   Quantity:  90 capsule   Refills:  0       cyclobenzaprine 10 MG tablet   Commonly known as:  FLEXERIL   This may have changed:  when to take this   Used for:  High grade B-cell lymphoma (H)        Dose:   10 mg   Take 1 tablet (10 mg) by mouth 3 times daily as needed   Quantity:  30 tablet   Refills:  0       levothyroxine 112 MCG tablet   Commonly known as:  SYNTHROID/LEVOTHROID   This may have changed:    - how much to take  - additional instructions   Used for:  Postsurgical hypothyroidism, Papillary carcinoma, follicular variant (H), Postsurgical hypoparathyroidism (H), Thyroid cancer (H)        Mon-Sat: (2 tabs daily) Sunday: 3 tabs   Quantity:  189 tablet   Refills:  3       ondansetron 8 MG ODT tab   Commonly known as:  ZOFRAN-ODT   This may have changed:  when to take this   Used for:  High grade B-cell lymphoma (H), Nausea and vomiting, intractability of vomiting not specified, unspecified vomiting type        Dose:  8 mg   Take 1 tablet (8 mg) by mouth every 8 hours as needed   Quantity:  30 tablet   Refills:  1       oxyCODONE IR 30 MG tablet   Commonly known as:  ROXICODONE   This may have changed:  when to take this   Used for:  High grade B-cell lymphoma (H)        Dose:  30 mg   Take 1 tablet (30 mg) by mouth every 4 hours as needed for moderate to severe pain   Quantity:  30 tablet   Refills:  0                Primary Care Provider Office Phone # Fax #    Shahla Raul Crawley [phone]       [address] [place] [id number]        Equal Access to Services     RODRIGO ZAMORA AH: Hadii sacha marley hadasho Soesteban, waaxda luqadaha, qaybta kaalmada adeegyada, ranjan ozuna hayrajiv yoder . So [facility] [phone].    ATENCIÓN: Si habla español, tiene a stiles disposición servicios gratuitos de asistencia lingüística. Llame al [phone].    We comply with applicable federal civil rights laws and [place] laws. We do not discriminate on the basis of race, color, national origin, age, disability, sex, sexual orientation, or gender identity.            Thank you!     Thank you for choosing [facility]  for your care. Our goal is always to provide you with  excellent care. Hearing back from our patients is one way we can continue to improve our services. Please take a few minutes to complete the written survey that you may receive in the mail after your visit with us. Thank you!             Your Updated Medication List - Protect others around you: Learn how to safely use, store and throw away your medicines at www.disposemymeds.org.          This list is accurate as of 1/29/18 12:03 PM.  Always use your most recent med list.                   Brand Name Dispense Instructions for use Diagnosis    acetaminophen 325 MG tablet    TYLENOL    100 tablet    Take 2 tablets (650 mg) by mouth every 4 hours as needed for mild pain or fever    High grade B-cell lymphoma (H)       acyclovir 400 MG tablet    ZOVIRAX    60 tablet    Take 1 tablet (400 mg) by mouth 2 times daily    High grade B-cell lymphoma (H)       aluminum chloride 20 % external solution    DRYSOL    60 mL    Apply topically At Bedtime    Rash, Intertrigo       AZMACORT IN      Inhale 2 puffs into the lungs as needed        bisacodyl 5 MG EC tablet     30 tablet    Take 3 tablets (15 mg) by mouth daily as needed for constipation    Constipation, unspecified constipation type       calcitRIOL 0.25 MCG capsule    ROCALTROL    90 capsule    TAKE 2 CAPSULES BY MOUTH EVERY MORNING AND TAKE 1 CAPSULE BY MOUTH EVERY NIGHT AT BEDTIME    Postsurgical hypothyroidism, Papillary carcinoma, follicular variant (H), Metastasis to cervical lymph node (H)       calcium citrate-vitamin D 315-250 MG-UNIT Tabs per tablet    CALCIUM CITRATE +D    20 tablet    Take 2 tablets by mouth twice a week    High grade B-cell lymphoma (H), Hypocalcemia       celecoxib 200 MG capsule    celeBREX    56 capsule    Take 1 capsule (200 mg) by mouth 2 times daily    Chest wall pain       cetirizine 10 MG tablet    ZYRTEC ALLERGY    90 tablet    Take 1 tablet (10 mg) by mouth 3 times daily On hold for lab test.    High grade B-cell lymphoma (H)        COMPOUND CONTAINING CONTROLLED SUBSTANCE - PHARMACY TO MIX COMPOUNDED MEDICATION    CMPD RX    120 g    Apply small amount to affected area two times daily.    Neoplasm related pain       cromolyn 4 % ophthalmic solution    OPTICROM    10 mL    Place 1 drop into both eyes 4 times daily    Idiopathic mast cell activation syndrome (H)       cromolyn sodium 5.2 MG/ACT Aers Inhaler    NASALCROM    1 Bottle    SPRAY ONE SPRAY( 1 ML) IN NOSTRIL DAILY    Mast cell disease, systemic       [employer] GUM[employer] CHILDR[employer] Chew     120 tablet    Take 5 g by mouth daily (2 gummy= 5 g =1 serving)    High grade B-cell lymphoma (H)       cyclobenzaprine 10 MG tablet    FLEXERIL    30 tablet    Take 1 tablet (10 mg) by mouth 3 times daily as needed    High grade B-cell lymphoma (H)       dexamethasone 4 MG tablet    DECADRON    4 tablet    Take 2 tablets (8 mg) by mouth daily for 2 days Please take on 1/23 and 1/24.    High grade B-cell lymphoma (H)       diazepam 5 MG tablet    VALIUM     Take 1 tablet (5 mg) by mouth 3 times daily For muscle spasms    Chest wall pain       diclofenac 1 % Gel topical gel    VOLTAREN    100 g    Apply affected area two times daily PRN using enclosed dosing card.    Myofascial pain       EPINEPHrine 0.3 MG/0.3ML injection 2-pack    EPIPEN 2-CARIDAD    0.6 mL    Inject 0.3 mLs (0.3 mg) into the muscle once as needed for anaphylaxis        fluconazole 200 MG tablet    DIFLUCAN    30 tablet    Take 1 tablet (200 mg) by mouth daily    High grade B-cell lymphoma (H)       furosemide 20 MG tablet    LASIX    60 tablet    Take 1 tablet (20 mg) by mouth 2 times daily    Localized edema       hydrochlorothiazide 12.5 MG capsule    MICROZIDE    30 capsule    Take 1 capsule (12.5 mg) by mouth daily    Hypocalcemia       levofloxacin 250 MG tablet    LEVAQUIN    30 tablet    Take 1 tablet (250 mg) by mouth daily    High grade B-cell lymphoma (H)       levothyroxine 112 MCG tablet    SYNTHROID/LEVOTHROID    189  tablet    Mon-Sat: (2 tabs daily) Sunday: 3 tabs    Postsurgical hypothyroidism, Papillary carcinoma, follicular variant (H), Postsurgical hypoparathyroidism (H), Thyroid cancer (H)       lidocaine 5 % ointment    XYLOCAINE    350 g    Apply quarter size amount to chest and back up to 3 times daily as needed for pain.    Chest wall pain       lidocaine visc 2% 2.5mL/5mL & maalox/mylanta w/ simeth 2.5mL/5mL & diphenhydrAMINE 5mg/5mL Susp suspension    [facility]    360 mL    Swish and spit 10 mLs in mouth every 6 hours as needed for mouth sores    Stomatitis and mucositis, High grade B-cell lymphoma (H)       lidocaine-prilocaine cream    EMLA    30 g    Apply topically as needed (port access pain.)    Neoplasm related pain, Chest wall pain       methocarbamol 750 MG tablet    ROBAXIN     Take 1 tablet (750 mg) by mouth 4 times daily . Ok to take a 5th Robaxin on very severe days.    Myofascial pain       montelukast 10 MG tablet    SINGULAIR    60 tablet    Take 2 tablets (20 mg) by mouth 2 times daily    Idiopathic mast cell activation syndrome (H)       morphine 30 MG 12 hr tablet    MS CONTIN    120 tablet    Take 1 tablet (30 mg) by mouth every 8 hours . Take an addition pill at night such that your evening dose is 60mg    Neoplasm related pain       ondansetron 8 MG ODT tab    ZOFRAN-ODT    30 tablet    Take 1 tablet (8 mg) by mouth every 8 hours as needed    High grade B-cell lymphoma (H), Nausea and vomiting, intractability of vomiting not specified, unspecified vomiting type       * order for DME     2 Units    Equipment being ordered: compression stockings. 20-30 mm or 30 - 40 mm as patient can tolerate. Physical therapy to determine if they should be above or below the knee.    Venous stasis       * order for DME     2 Device    Equipment being ordered: compression bra    Malignant neoplasm of right female breast, unspecified site of breast       * order for DME     1 Units    Compression  stockings, medium grade, please measure and fit. Please dispense a pair.    Peripheral edema       oxyCODONE IR 30 MG tablet    ROXICODONE    30 tablet    Take 1 tablet (30 mg) by mouth every 4 hours as needed for moderate to severe pain    High grade B-cell lymphoma (H)       polyethylene glycol powder    MIRALAX    510 g    Take 17 g (1 capful) by mouth daily    Acute constipation       potassium chloride SA 20 MEQ CR tablet    KLOR-CON    30 tablet    Take 1 tablet (20 mEq) by mouth 2 times daily    Hypokalemia       PROBIOTIC DAILY PO      Take 1 capsule by mouth daily Lacto acid bifidobacterium    Breast cancer, unspecified laterality, Thyroid cancer (H), Chronic arthralgias of knees and hips, unspecified laterality       prochlorperazine 10 MG tablet    COMPAZINE     Take 10 mg by mouth as needed        ranitidine 75 MG tablet    ZANTAC    90 tablet    Take 1 tablet (75 mg) by mouth 3 times daily    Mast cell disease, systemic       senna-docusate 8.6-50 MG per tablet    SENOKOT-S;PERICOLACE    60 tablet    Take 1-2 tablets by mouth 2 times daily as needed for constipation    Acute constipation       SUMAtriptan 50 MG tablet    IMITREX     Take 50 mg by mouth as needed        triamcinolone 0.025 % ointment    KENALOG     Apply topically as needed        * UNABLE TO FIND      3 tablets 3 times daily MEDICATION NAME: calcium D-Glucarate  3 caps contain 180mg of elemental calcium.        * UNABLE TO FIND      Take 2 capsules by mouth 3 times daily Muscle Mag. 2 caps contain B1 20mg, B2 20mg, B6 10mg, magesium 20mg, manganese 2mg.        * UNABLE TO FIND      2 tablets 3 times daily MEDICATION NAME: Digestzymes    Thyroid cancer (H), Postsurgical hypothyroidism, Postsurgical hypoparathyroidism (H)       * UNABLE TO FIND      1 tablet daily MEDICATION NAME: Pure Encapsulations    Thyroid cancer (H), Postsurgical hypothyroidism, Postsurgical hypoparathyroidism (H)       VENTOLIN  (90 BASE) MCG/ACT Inhaler    Generic drug:  albuterol     18 g    INHALE 2 PUFFS INTO THE LUNGS EVERY 4 HOURS AS NEEDED FOR SHORTNESS OF BREATH OR DIFFICULT BREATHING OR WHEEZING    Mild intermittent asthma without complication       vitamin D3 2000 UNITS Caps     60 capsule    Take 5,000 Units by mouth daily Takes 2 tabs daily    Thyroid cancer (H), Postsurgical hypothyroidism, Papillary carcinoma, follicular variant (H), Postsurgical hypoparathyroidism (H)       * Notice:  This list has 7 medication(s) that are the same as other medications prescribed for you. Read the directions carefully, and ask your doctor or other care provider to review them with you.

## 2018-01-29 NOTE — MR AVS SNAPSHOT
After Visit Summary   1/29/2018    Tisha Arias    MRN: [id number]           Patient Information     Date Of Birth          1960        Visit Information        Provider Department      1/29/2018 12:30 PM Selina Elizondo PA [facility]        Today's Diagnoses     Diffuse large B-cell lymphoma, unspecified body region (H)    -  1    Dizziness           Follow-ups after your visit        Your next 10 appointments already scheduled     Jan 29, 2018  8:00 PM CST   CT HEAD W/O CONTRAST with UCCT2   [facility] [place] ([facility])    [address] [place] [address] Floor  [employer] [address]   [phone]           Please bring any scans or X-rays taken at other hospitals, if similar tests were done. Also bring a list of your medicines, including vitamins, minerals and over-the-counter drugs. It is safest to leave personal items at home.  Be sure to tell your doctor:   If you have any allergies.   If there s any chance you are pregnant.   If you are breastfeeding.   If you have any special needs.  You do not need to do anything special to prepare.  Please wear loose clothing, such as a sweat suit or jogging clothes. Avoid snaps, zippers and other metal. We may ask you to undress and put on a hospital gown.            Feb 01, 2018  8:30 AM CST   [facility] Lab Draw with UC [facility] LAB DRAW   [facility])    [address] [place] [address]  [address] [place] [address]   [phone]            Feb 05, 2018  8:30 AM CST   Masonic Lab Draw with UC MASONIC LAB DRAW   [facility] Masonic Lab Draw ([facility])    [address] [place] [address]  [address] [place] [address]   [phone]            Feb 08, 2018  8:30 AM CST   Masonic Lab Draw with UC MASONIC LAB DRAW   [facility] Masonic Lab Draw ([facility])    [address] [place] [address]  [address] [place] [address]  [address]   [phone]            Feb 27, 2018  6:45 AM CST   (Arrive by 6:30 AM)   PET ONCOLOGY WHOLE BODY with UUPET1   [facility] [place] PET CT ([facility])    [address] [address] [phone]           Tell your doctor:   If there is any chance you may be pregnant or if you are breastfeeding.   If you have problems lying in small spaces (claustrophobia). If you do, your doctor may give you medicine to help you relax. If you have diabetes:   Have your exam early in the morning. Your blood glucose will go up as the day goes by.   Your glucose level must be 180 or less at the start of the exam. Please take any medicines you need to ensure this blood glucose level.   If you are taking insulin in the morning take with breakfast by 6 am and schedule procedure between 12 and 2:15 pm.   If you are taking insulin at night take nightly dose, fast overnight, schedule procedure before 10 am.   If you take insulin both morning and night take morning dose by 6am and schedule procedure between 12 and 2:15 pm.   24 hours before your scan: Don t do any heavy exercise. (No jogging, aerobics or other workouts.) Exercise will make your pictures less accurate.  7 hours before your scan: Eat a low carb, high protein meal (Lean meats, seafood, beans, soy, low-fat dairy, eggs, nuts & seeds). 6 hours before your scan:   Stop all food and liquids (except water).   Do not chew gum or suck on mints.   If you need to take medicine with food, you may take it with a few crackers.  Please call your Imaging Department at your exam site with any questions.            Feb 28, 2018  9:30 AM CST   (Arrive by 9:15 AM)   Return Visit with Edu Benitez MD   [facility] ([facility])    [address]  [address]  [facility] [address]   [phone]            Feb 28, 2018 10:15 AM CST   RETURN ONC with Garima  MD Sohail   [facility] Marrow Transplant ([facility])    [address]  [address]  [facility] [id number] [phone]            May 21, 2018  4:20 PM CDT   (Arrive by 4:05 PM)   RETURN ENDOCRINE with Avelina Castro MD   [facility] Endocrinology ([facility])    [address]  3rd Floor  [facility] [id number] [phone]            Jun 11, 2018  4:00 PM CDT   (Arrive by 3:45 PM)   Return Visit with Shahla Crawley MD   [facility] ([facility])    [address]  [address]  [facility] [id number] [phone]              Future tests that were ordered for you today     Open Standing Orders        Priority Remaining Interval Expires Ordered    Red blood cell prepare order unit Routine 99/100 CONDITIONAL (SPECIFY) BLOOD  1/26/2018            Who to contact     If you have questions or need follow up information about today's clinic visit or your schedule please contact [facility] directly at [phone].  Normal or non-critical lab and imaging results will be communicated to you by Kloneworldhart, letter or phone within 4 business days after the clinic has received the results. If you do not hear from us within 7 days, please contact the clinic through Kloneworldhart or phone. If you have a critical or abnormal lab result, we will notify you by phone as soon as possible.  Submit refill requests through [employer] or call your pharmacy and they will forward the refill request to us. Please allow 3 business days for your refill to be completed.          Additional Information About Your Visit        MyChart Information     [employer] gives you secure access to your electronic health record. If you see a primary care provider, you can also send messages to your care team and make appointments. If you have questions, please call your primary care clinic.  If you do not have a  primary care provider, please call [phone] and they will assist you.        Care EveryWhere ID     This is your Care EveryWhere ID. This could be used by other organizations to access your [place] medical records  [id number]         Blood Pressure from Last 3 Encounters:   01/29/18 103/71   01/24/18 124/79   01/22/18 108/65    Weight from Last 3 Encounters:   01/29/18 81.1 kg (178 lb 14.4 oz)   01/24/18 86.3 kg (190 lb 4.8 oz)   01/22/18 87.2 kg (192 lb 3.9 oz)              Today, you had the following     No orders found for display         Today's Medication Changes          These changes are accurate as of 1/29/18  1:54 PM.  If you have any questions, ask your nurse or doctor.               These medicines have changed or have updated prescriptions.        Dose/Directions    calcitRIOL 0.25 MCG capsule   Commonly known as:  ROCALTROL   This may have changed:    - how much to take  - how to take this  - when to take this  - additional instructions   Used for:  Postsurgical hypothyroidism, Papillary carcinoma, follicular variant (H), Metastasis to cervical lymph node (H)        TAKE 2 CAPSULES BY MOUTH EVERY MORNING AND TAKE 1 CAPSULE BY MOUTH EVERY NIGHT AT BEDTIME   Quantity:  90 capsule   Refills:  0       cyclobenzaprine 10 MG tablet   Commonly known as:  FLEXERIL   This may have changed:  when to take this   Used for:  High grade B-cell lymphoma (H)        Dose:  10 mg   Take 1 tablet (10 mg) by mouth 3 times daily as needed   Quantity:  30 tablet   Refills:  0       levothyroxine 112 MCG tablet   Commonly known as:  SYNTHROID/LEVOTHROID   This may have changed:    - how much to take  - additional instructions   Used for:  Postsurgical hypothyroidism, Papillary carcinoma, follicular variant (H), Postsurgical hypoparathyroidism (H), Thyroid cancer (H)        Mon-Sat: (2 tabs daily) Sunday: 3 tabs   Quantity:  189 tablet   Refills:  3       ondansetron 8 MG ODT tab   Commonly known as:  ZOFRAN-ODT   This  may have changed:  when to take this   Used for:  High grade B-cell lymphoma (H), Nausea and vomiting, intractability of vomiting not specified, unspecified vomiting type        Dose:  8 mg   Take 1 tablet (8 mg) by mouth every 8 hours as needed   Quantity:  30 tablet   Refills:  1       oxyCODONE IR 30 MG tablet   Commonly known as:  ROXICODONE   This may have changed:  when to take this   Used for:  High grade B-cell lymphoma (H)        Dose:  30 mg   Take 1 tablet (30 mg) by mouth every 4 hours as needed for moderate to severe pain   Quantity:  30 tablet   Refills:  0                Primary Care Provider Office Phone # Fax #    Shahla Raul Crawley [phone]       [address] [address]        Equal Access to Services     RODRIGO ZAMORA : Kevin huff Soesteban, waaxda luqadaha, qaybta kaalmada adeegyada, ranjan martin. So [facility] [phone].    ATENCIÓN: Si habla español, tiene a stiles disposición servicios gratuitos de asistencia lingüística. [facility] [phone].    We comply with applicable federal civil rights laws and [place] laws. We do not discriminate on the basis of race, color, national origin, age, disability, sex, sexual orientation, or gender identity.            Thank you!     Thank you for choosing [facility]  for your care. Our goal is always to provide you with excellent care. Hearing back from our patients is one way we can continue to improve our services. Please take a few minutes to complete the written survey that you may receive in the mail after your visit with us. Thank you!             Your Updated Medication List - Protect others around you: Learn how to safely use, store and throw away your medicines at www.disposemymeds.org.          This list is accurate as of 1/29/18  1:54 PM.  Always use your most recent med list.                   Brand Name Dispense Instructions for use Diagnosis     acetaminophen 325 MG tablet    TYLENOL    100 tablet    Take 2 tablets (650 mg) by mouth every 4 hours as needed for mild pain or fever    High grade B-cell lymphoma (H)       acyclovir 400 MG tablet    ZOVIRAX    60 tablet    Take 1 tablet (400 mg) by mouth 2 times daily    High grade B-cell lymphoma (H)       aluminum chloride 20 % external solution    DRYSOL    60 mL    Apply topically At Bedtime    Rash, Intertrigo       AZMACORT IN      Inhale 2 puffs into the lungs as needed        bisacodyl 5 MG EC tablet     30 tablet    Take 3 tablets (15 mg) by mouth daily as needed for constipation    Constipation, unspecified constipation type       calcitRIOL 0.25 MCG capsule    ROCALTROL    90 capsule    TAKE 2 CAPSULES BY MOUTH EVERY MORNING AND TAKE 1 CAPSULE BY MOUTH EVERY NIGHT AT BEDTIME    Postsurgical hypothyroidism, Papillary carcinoma, follicular variant (H), Metastasis to cervical lymph node (H)       calcium citrate-vitamin D 315-250 MG-UNIT Tabs per tablet    CALCIUM CITRATE +D    20 tablet    Take 2 tablets by mouth twice a week    High grade B-cell lymphoma (H), Hypocalcemia       celecoxib 200 MG capsule    celeBREX    56 capsule    Take 1 capsule (200 mg) by mouth 2 times daily    Chest wall pain       cetirizine 10 MG tablet    ZYRTEC ALLERGY    90 tablet    Take 1 tablet (10 mg) by mouth 3 times daily On hold for lab test.    High grade B-cell lymphoma (H)       COMPOUND CONTAINING CONTROLLED SUBSTANCE - PHARMACY TO MIX COMPOUNDED MEDICATION    CMPD RX    120 g    Apply small amount to affected area two times daily.    Neoplasm related pain       cromolyn 4 % ophthalmic solution    OPTICROM    10 mL    Place 1 drop into both eyes 4 times daily    Idiopathic mast cell activation syndrome (H)       cromolyn sodium 5.2 MG/ACT Aers Inhaler    NASALCROM    1 Bottle    SPRAY ONE SPRAY( 1 ML) IN NOSTRIL DAILY    Mast cell disease, systemic       [employer] GUMMY [employer]ILDREN Chew     120 tablet    Take 5 g  by mouth daily (2 gummy= 5 g =1 serving)    High grade B-cell lymphoma (H)       cyclobenzaprine 10 MG tablet    FLEXERIL    30 tablet    Take 1 tablet (10 mg) by mouth 3 times daily as needed    High grade B-cell lymphoma (H)       dexamethasone 4 MG tablet    DECADRON    4 tablet    Take 2 tablets (8 mg) by mouth daily for 2 days Please take on 1/23 and 1/24.    High grade B-cell lymphoma (H)       diazepam 5 MG tablet    VALIUM     Take 1 tablet (5 mg) by mouth 3 times daily For muscle spasms    Chest wall pain       diclofenac 1 % Gel topical gel    VOLTAREN    100 g    Apply affected area two times daily PRN using enclosed dosing card.    Myofascial pain       EPINEPHrine 0.3 MG/0.3ML injection 2-pack    EPIPEN 2-CARIDAD    0.6 mL    Inject 0.3 mLs (0.3 mg) into the muscle once as needed for anaphylaxis        fluconazole 200 MG tablet    DIFLUCAN    30 tablet    Take 1 tablet (200 mg) by mouth daily    High grade B-cell lymphoma (H)       furosemide 20 MG tablet    LASIX    60 tablet    Take 1 tablet (20 mg) by mouth 2 times daily    Localized edema       hydrochlorothiazide 12.5 MG capsule    MICROZIDE    30 capsule    Take 1 capsule (12.5 mg) by mouth daily    Hypocalcemia       levofloxacin 250 MG tablet    LEVAQUIN    30 tablet    Take 1 tablet (250 mg) by mouth daily    High grade B-cell lymphoma (H)       levothyroxine 112 MCG tablet    SYNTHROID/LEVOTHROID    189 tablet    Mon-Sat: (2 tabs daily) Sunday: 3 tabs    Postsurgical hypothyroidism, Papillary carcinoma, follicular variant (H), Postsurgical hypoparathyroidism (H), Thyroid cancer (H)       lidocaine 5 % ointment    XYLOCAINE    350 g    Apply quarter size amount to chest and back up to 3 times daily as needed for pain.    Chest wall pain       lidocaine visc 2% 2.5mL/5mL & maalox/mylanta w/ simeth 2.5mL/5mL & diphenhydrAMINE 5mg/5mL Susp suspension    [facility]    360 mL    Swish and spit 10 mLs in mouth every 6 hours as needed for  mouth sores    Stomatitis and mucositis, High grade B-cell lymphoma (H)       lidocaine-prilocaine cream    EMLA    30 g    Apply topically as needed (port access pain.)    Neoplasm related pain, Chest wall pain       methocarbamol 750 MG tablet    ROBAXIN     Take 1 tablet (750 mg) by mouth 4 times daily . Ok to take a 5th Robaxin on very severe days.    Myofascial pain       montelukast 10 MG tablet    SINGULAIR    60 tablet    Take 2 tablets (20 mg) by mouth 2 times daily    Idiopathic mast cell activation syndrome (H)       morphine 30 MG 12 hr tablet    MS CONTIN    120 tablet    Take 1 tablet (30 mg) by mouth every 8 hours . Take an addition pill at night such that your evening dose is 60mg    Neoplasm related pain       ondansetron 8 MG ODT tab    ZOFRAN-ODT    30 tablet    Take 1 tablet (8 mg) by mouth every 8 hours as needed    High grade B-cell lymphoma (H), Nausea and vomiting, intractability of vomiting not specified, unspecified vomiting type       * order for DME     2 Units    Equipment being ordered: compression stockings. 20-30 mm or 30 - 40 mm as patient can tolerate. Physical therapy to determine if they should be above or below the knee.    Venous stasis       * order for DME     2 Device    Equipment being ordered: compression bra    Malignant neoplasm of right female breast, unspecified site of breast       * order for DME     1 Units    Compression stockings, medium grade, please measure and fit. Please dispense a pair.    Peripheral edema       oxyCODONE IR 30 MG tablet    ROXICODONE    30 tablet    Take 1 tablet (30 mg) by mouth every 4 hours as needed for moderate to severe pain    High grade B-cell lymphoma (H)       polyethylene glycol powder    MIRALAX    510 g    Take 17 g (1 capful) by mouth daily    Acute constipation       potassium chloride SA 20 MEQ CR tablet    KLOR-CON    30 tablet    Take 1 tablet (20 mEq) by mouth 2 times daily    Hypokalemia       PROBIOTIC DAILY PO      Take  1 capsule by mouth daily Lacto acid bifidobacterium    Breast cancer, unspecified laterality, Thyroid cancer (H), Chronic arthralgias of knees and hips, unspecified laterality       prochlorperazine 10 MG tablet    COMPAZINE     Take 10 mg by mouth as needed        ranitidine 75 MG tablet    ZANTAC    90 tablet    Take 1 tablet (75 mg) by mouth 3 times daily    Mast cell disease, systemic       senna-docusate 8.6-50 MG per tablet    SENOKOT-S;PERICOLACE    60 tablet    Take 1-2 tablets by mouth 2 times daily as needed for constipation    Acute constipation       SUMAtriptan 50 MG tablet    IMITREX     Take 50 mg by mouth as needed        triamcinolone 0.025 % ointment    KENALOG     Apply topically as needed        * UNABLE TO FIND      3 tablets 3 times daily MEDICATION NAME: calcium D-Glucarate  3 caps contain 180mg of elemental calcium.        * UNABLE TO FIND      Take 2 capsules by mouth 3 times daily Muscle Mag. 2 caps contain B1 20mg, B2 20mg, B6 10mg, magesium 20mg, manganese 2mg.        * UNABLE TO FIND      2 tablets 3 times daily MEDICATION NAME: Digestzymes    Thyroid cancer (H), Postsurgical hypothyroidism, Postsurgical hypoparathyroidism (H)       * UNABLE TO FIND      1 tablet daily MEDICATION NAME: Pure Encapsulations    Thyroid cancer (H), Postsurgical hypothyroidism, Postsurgical hypoparathyroidism (H)       VENTOLIN  (90 BASE) MCG/ACT Inhaler   Generic drug:  albuterol     18 g    INHALE 2 PUFFS INTO THE LUNGS EVERY 4 HOURS AS NEEDED FOR SHORTNESS OF BREATH OR DIFFICULT BREATHING OR WHEEZING    Mild intermittent asthma without complication       vitamin D3 2000 UNITS Caps     60 capsule    Take 5,000 Units by mouth daily Takes 2 tabs daily    Thyroid cancer (H), Postsurgical hypothyroidism, Papillary carcinoma, follicular variant (H), Postsurgical hypoparathyroidism (H)       * Notice:  This list has 7 medication(s) that are the same as other medications prescribed for you. Read the  directions carefully, and ask your doctor or other care provider to review them with you.

## 2018-01-29 NOTE — PROGRESS NOTES
Infusion Nursing Note:  Tisha Arias presents today for Possible transfusion.    Patient seen by provider today: Yes: Selina ANSARI   present during visit today: Not Applicable.    Note: Patient endorses fatigue, dizziny, nausea taken Zofran at home. States she had 3 episode of fall since Friday due to dizziness. Denies loss of consciousness. She sustain cut on the bridge of her nose and upper lip,when she fell on Saturday face down. Minimal redness with no tenderness or open area.  NO active bleeding nor open area.  Did not seek medical attention. Seen and examined by Selina ANSARI.     At 1345H Patient was not able to wait. Left voicemail message regarding her appointment on 2/1/18 on blood test and Selina Elizondo.     At 1430H Left voice mail for appointment reminder. Sent [email or web address] to Laila Barrett    Intravenous Access:  Implanted Port.    Treatment Conditions:  Lab Results   Component Value Date    HGB 9.5 01/29/2018     Lab Results   Component Value Date    WBC 0.1 01/29/2018      Lab Results   Component Value Date    ANEU 10.9 01/24/2018     Lab Results   Component Value Date    PLT 39 01/29/2018      Lab Results   Component Value Date     01/24/2018                   Lab Results   Component Value Date    POTASSIUM 3.8 01/24/2018           Lab Results   Component Value Date    MAG 1.8 12/28/2017            Lab Results   Component Value Date    CR 0.63 01/24/2018                   Lab Results   Component Value Date    ELMO 7.7 01/24/2018                Lab Results   Component Value Date    BILITOTAL 1.0 01/18/2018           Lab Results   Component Value Date    ALBUMIN 2.6 01/18/2018                    Lab Results   Component Value Date    ALT 18 01/18/2018           Lab Results   Component Value Date    AST 18 01/18/2018       Results reviewed, labs did NOT meet treatment parameters: HB <8 Plt <10.    TORB: 1/29/18/1144H/ Selina ANSARI/ Cyndie Russell/ Ashley  patient to take Levaquin and Fluconazole. Stop taking Lasix. To take orthostatic. Draw BMP. No need for blood transfusion today.    TORB:1/29/1829/18/1315/Selina ANSARI/ Cyndie Ball RN/ Schedule on Thursday to see Jessica Cox or Selina Elizondo and for possible platelet transfusion.     Post Infusion Assessment:  Site patent and intact, free from redness, edema or discomfort.  No evidence of extravasations.  Access discontinued per protocol.    Discharge Plan:   Patient decline medication refill.  Discharge instructions reviewed with: Patient.  Patient and/or family verbalized understanding of discharge instructions and all questions answered.  Copy of AVS reviewed with patient and/or family.  Patient will return 2/1/18 for next appointment.  Patient discharged in stable condition accompanied by: self.  Departure Mode: Ambulatory.    JULIÁN BURROUGHS, CASSIDY

## 2018-01-29 NOTE — PROGRESS NOTES
"Oncology/Hematology Visit Note  Jan 29, 2018    Reason for Visit: follow up of DLBCL s/p C6 DA R-EPOCH. 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 She is currently undergoing treatment with DA-R-EPOCH. Her past medical history is significant for breast cancer in 2011 s/p bilateral mastectomies and BENJIE/BSO up until recently on Tamoxifen, papillary thyroid cancer s/p total thyroidectomy, chronic chest wall pain, and asthma. Briefly, her oncologic history is as follows:     She presented in 8/2017 with dramatically worse chest and back pain. CT 9/1/17 showed lytic lesion right 10th rib extending to right T9-10 neural foramen with vertebral body invasion. There was associated conglomerate of lymphadenopathy around the mid T-spine. She had a CT guided biopsy showing B-cell high grade lymphoma. Pathology showed \"The morphology shows a population of large cells, diffuse lymphoid infiltrate. The cells are atypical with abundant mitotic and apoptotic activity and single cell necrosis. Tumor is CD45 and CD79a positive and focally weakly CD30 positive. The other stains revealed positivity for CD20, BCL6, BCL2 and MUM1, and CD10 and CD21 negative. The CD5 and CD3 highlighted background T-cells.  MYC expression was equivocal with approximately 40% nuclei staining.  Ki-67 proliferative index is greater than 90-95%. The immunohistochemistry is suggestive of non-GCB immunophenotype of diffuse large B-cell lymphoma. The cytogenetics did not show rearrangement of the BCL2 or c-MYC. There is the presence of BCL6 rearrangement in 78% of cells and gains of MYC and BCL2, but no amplifications.\"      Staging LP and BMBx were negative for lymphoma. She started DA-REPOCH 10/6/17. She did well with chemo other than rigors during CIVI. D/c 10/11/17. Given Neulasta 10/12/17. Post cycle 1 complicated by mucositis " and worsening of chronic LE peripheral edema. She began cycle 2 on 10/27/17. Due to a rise in her LD and uric acid levels on that admission day, she underwent a CT scan which showed response to therapy with improvement in her mediastinal lymphadenopathy and right chest wall mass. She had a CR following 4 cycles on PET/CT 12/21.     Interval History:  Alfred states she has fallen 3 times since I last saw her. She was getting out of the care when she fell and hit the pavement in the parking lot, striking her face and sustaining an abrasion on her upper lip and on lateral edge of her left hand. She denies loss of consciousness. She did feel dizziness and vertigo at the time. She fell again when getting off a circular seat in the lobby and again at home when getting up out of a chair. She denies any headaches, tinnitus, hearing loss, double vision, numbness/tingling or weakness of any extremity. Denies any abnormalities of gait or difficulty with coordination. She denies any recent fever, chills, SOB, chest pain, cough, sinus congestion, dysuria. Not much appetite, but making herself eat/drink. She has mild nausea, no vomiting. Denies abdominal pain, diarrhea. She has been taking lasix for edema and this has been stable. Denies any bleeding. Has had a few bruises from falls and some small ecchymoses of right forearm without any recall of trauma to that arm.  She has had some pain in the right side of her throat recently, but able to tolerate food and drink without difficulty. Denies any mucositis in mouth or dysphagia.    Current Outpatient Prescriptions   Medication Sig Dispense Refill     cromolyn sodium (NASALCROM) 5.2 MG/ACT AERS Inhaler SPRAY ONE SPRAY( 1 ML) IN NOSTRIL DAILY 1 Bottle 6     lidocaine-prilocaine (EMLA) cream Apply topically as needed (port access pain.) 30 g 1     diazepam (VALIUM) 5 MG tablet Take 1 tablet (5 mg) by mouth 3 times daily For muscle spasms       [employer] FIBER GUMMY BEARS CHILDREN CHEW  Take 5 g by mouth daily (2 gummy= 5 g =1 serving) 120 tablet 3     methocarbamol (ROBAXIN) 750 MG tablet Take 1 tablet (750 mg) by mouth 4 times daily . Ok to take a 5th Robaxin on very severe days.       diclofenac (VOLTAREN) 1 % GEL topical gel Apply affected area two times daily PRN using enclosed dosing card. 100 g 0     oxyCODONE IR (ROXICODONE) 30 MG tablet Take 1 tablet (30 mg) by mouth every 4 hours as needed for moderate to severe pain (Patient taking differently: Take 30 mg by mouth every 4 hours ) 30 tablet 0     celecoxib (CELEBREX) 200 MG capsule Take 1 capsule (200 mg) by mouth 2 times daily 56 capsule 0     ondansetron (ZOFRAN-ODT) 8 MG ODT tab Take 1 tablet (8 mg) by mouth every 8 hours as needed (Patient taking differently: Take 8 mg by mouth every 8 hours ) 30 tablet 1     fluconazole (DIFLUCAN) 200 MG tablet Take 1 tablet (200 mg) by mouth daily 30 tablet 0     cetirizine (ZYRTEC ALLERGY) 10 MG tablet Take 1 tablet (10 mg) by mouth 3 times daily On hold for lab test. 90 tablet 0     acyclovir (ZOVIRAX) 400 MG tablet Take 1 tablet (400 mg) by mouth 2 times daily 60 tablet 0     dexamethasone (DECADRON) 4 MG tablet Take 2 tablets (8 mg) by mouth daily for 2 days Please take on 1/23 and 1/24. 4 tablet 0     hydrochlorothiazide (MICROZIDE) 12.5 MG capsule Take 1 capsule (12.5 mg) by mouth daily 30 capsule 0     levofloxacin (LEVAQUIN) 250 MG tablet Take 1 tablet (250 mg) by mouth daily 30 tablet 0     calcium citrate-vitamin D (CALCIUM CITRATE +D) 315-250 MG-UNIT TABS per tablet Take 2 tablets by mouth twice a week 20 tablet 0     cyclobenzaprine (FLEXERIL) 10 MG tablet Take 1 tablet (10 mg) by mouth 3 times daily as needed (Patient taking differently: Take 10 mg by mouth 3 times daily ) 30 tablet 0     ranitidine (ZANTAC) 75 MG tablet Take 1 tablet (75 mg) by mouth 3 times daily 90 tablet 0     Cholecalciferol (VITAMIN D3) 2000 UNITS CAPS Take 5,000 Units by mouth daily Takes 2 tabs daily 60 capsule  0     furosemide (LASIX) 20 MG tablet Take 1 tablet (20 mg) by mouth 2 times daily 60 tablet 0     calcitRIOL (ROCALTROL) 0.25 MCG capsule TAKE 2 CAPSULES BY MOUTH EVERY MORNING AND TAKE 1 CAPSULE BY MOUTH EVERY NIGHT AT BEDTIME (Patient taking differently: Take 0.25 mcg by mouth 2 times daily TAKE 2 CAPSULES BY MOUTH EVERY MORNING AND TAKE 1 CAPSULE BY MOUTH EVERY NIGHT AT NOON.) 90 capsule 0     cromolyn (OPTICROM) 4 % ophthalmic solution Place 1 drop into both eyes 4 times daily 10 mL 0     montelukast (SINGULAIR) 10 MG tablet Take 2 tablets (20 mg) by mouth 2 times daily 60 tablet 0     potassium chloride SA (KLOR-CON) 20 MEQ CR tablet Take 1 tablet (20 mEq) by mouth 2 times daily 30 tablet 0     morphine (MS CONTIN) 30 MG 12 hr tablet Take 1 tablet (30 mg) by mouth every 8 hours . Take an addition pill at night such that your evening dose is 60mg 120 tablet 0     magic mouthwash suspension (diphenhydrAMINE, lidocaine, aluminum-magnesium & simethicone) Swish and spit 10 mLs in mouth every 6 hours as needed for mouth sores 360 mL 1     prochlorperazine (COMPAZINE) 10 MG tablet Take 10 mg by mouth as needed  3     SUMAtriptan (IMITREX) 50 MG tablet Take 50 mg by mouth as needed  3     acetaminophen (TYLENOL) 325 MG tablet Take 2 tablets (650 mg) by mouth every 4 hours as needed for mild pain or fever 100 tablet      triamcinolone (KENALOG) 0.025 % ointment Apply topically as needed  3     lidocaine (XYLOCAINE) 5 % ointment Apply quarter size amount to chest and back up to 3 times daily as needed for pain. 350 g 1     bisacodyl (DULCOLAX) 5 MG EC tablet Take 3 tablets (15 mg) by mouth daily as needed for constipation 30 tablet 0     polyethylene glycol (MIRALAX) powder Take 17 g (1 capful) by mouth daily 510 g 0     senna-docusate (SENOKOT-S;PERICOLACE) 8.6-50 MG per tablet Take 1-2 tablets by mouth 2 times daily as needed for constipation 60 tablet 0     UNABLE TO FIND Take 2 capsules by mouth 3 times daily Muscle  Mag. 2 caps contain B1 20mg, B2 20mg, B6 10mg, magesium 20mg, manganese 2mg.       order for DME Compression stockings, medium grade, please measure and fit. Please dispense a pair. 1 Units 0     COMPOUND CONTAINING CONTROLLED SUBSTANCE (CMPD RX) - PHARMACY TO MIX COMPOUNDED MEDICATION Apply small amount to affected area two times daily. (Patient not taking: Reported on 1/24/2018) 120 g 6     levothyroxine (SYNTHROID/LEVOTHROID) 112 MCG tablet Mon-Sat: (2 tabs daily) Sunday: 3 tabs (Patient taking differently: 112 mcg Mon-Sat: (2 tabs daily) Sunday: 3 tabs) 189 tablet 3     EPINEPHrine (EPIPEN 2-CARIDAD) 0.3 MG/0.3ML injection Inject 0.3 mLs (0.3 mg) into the muscle once as needed for anaphylaxis (Patient not taking: Reported on 1/24/2018) 0.6 mL 3     VENTOLIN  (90 BASE) MCG/ACT Inhaler INHALE 2 PUFFS INTO THE LUNGS EVERY 4 HOURS AS NEEDED FOR SHORTNESS OF BREATH OR DIFFICULT BREATHING OR WHEEZING 18 g 3     order for DME Equipment being ordered: compression stockings. 20-30 mm or 30 - 40 mm as patient can tolerate. Physical therapy to determine if they should be above or below the knee. 2 Units 4     order for DME Equipment being ordered: compression bra 2 Device 1     aluminum chloride (DRYSOL) 20 % external solution Apply topically At Bedtime 60 mL 3     UNABLE TO FIND 3 tablets 3 times daily MEDICATION NAME: calcium D-Glucarate   3 caps contain 180mg of elemental calcium.       Triamcinolone Acetonide (AZMACORT IN) Inhale 2 puffs into the lungs as needed       UNABLE TO FIND 2 tablets 3 times daily MEDICATION NAME: Digestzymes        UNABLE TO FIND 1 tablet daily MEDICATION NAME: Pure Encapsulations       Probiotic Product (PROBIOTIC DAILY PO) Take 1 capsule by mouth daily Lacto acid bifidobacterium         Physical Examination:  General: The patient is a pleasant female in no acute distress.  There were no vitals taken for this visit.  Wt Readings from Last 10 Encounters:   01/29/18 81.1 kg (178 lb 14.4 oz)    01/24/18 86.3 kg (190 lb 4.8 oz)   01/22/18 87.2 kg (192 lb 3.9 oz)   01/18/18 88.2 kg (194 lb 8 oz)   01/15/18 86.1 kg (189 lb 14.4 oz)   01/09/18 82.3 kg (181 lb 8 oz)   01/03/18 85.7 kg (189 lb)   01/01/18 87.5 kg (193 lb)   12/26/17 85.5 kg (188 lb 7.9 oz)   12/19/17 83.8 kg (184 lb 11.2 oz)     HEENT: EOMI, PERRL. Sclerae are anicteric. Oral mucosa is pink and moist with no lesions or thrush. Visible posterior pharynx is without erythema.  Lymph: Neck is supple with no lymphadenopathy in the cervical or supraclavicular areas.   Heart: Regular rate and rhythm.   Lungs: Clear to auscultation bilaterally.   Abdomen: Bowel sounds present, soft, nontender with no palpable hepatosplenomegaly or masses.   Extremities: 1+ pitting edema to distal 1/3 of lower extremities.  Neuro: Cranial nerves II through XII are grossly intact. Strength 5/5 in UE and LEs and symmetric. No dysmetria on F->N or H->. Able to perform rapid alternating hand movements. Normal gait. She does become unsteady with tandem heel-toe walk. No sway on Romberg.  Skin: Abrasion on upper lip, left lateral edge of hand. Small 2-3 ecchymotic lesions about 2-3mm on right forearm. No rashes, petechiae, or bruising noted on exposed skin.    Laboratory Data:  Results for orders placed or performed in visit on 01/29/18 (from the past 24 hour(s))   CBC with platelets differential   Result Value Ref Range    WBC 0.1 (LL) 4.0 - 11.0 10e9/L    RBC Count 2.84 (L) 3.8 - 5.2 10e12/L    Hemoglobin 9.5 (L) 11.7 - 15.7 g/dL    Hematocrit 28.1 (L) 35.0 - 47.0 %    MCV 99 78 - 100 fl    MCH 33.5 (H) 26.5 - 33.0 pg    MCHC 33.8 31.5 - 36.5 g/dL    RDW 15.6 (H) 10.0 - 15.0 %    Platelet Count 39 (LL) 150 - 450 10e9/L    Diff Method WBC <0.5, Diff not done    ABO/Rh type and screen   Result Value Ref Range    ABO O     RH(D) Pos     Antibody Screen Neg     Test Valid Only At          [facility]    Specimen Expires 02/01/2018   "  Basic metabolic panel   Result Value Ref Range    Sodium 133 133 - 144 mmol/L    Potassium 4.1 3.4 - 5.3 mmol/L    Chloride 98 94 - 109 mmol/L    Carbon Dioxide 29 20 - 32 mmol/L    Anion Gap 6 3 - 14 mmol/L    Glucose 131 (H) 70 - 99 mg/dL    Urea Nitrogen 21 7 - 30 mg/dL    Creatinine 0.66 0.52 - 1.04 mg/dL    GFR Estimate >90 >60 mL/min/1.7m2    GFR Estimate If Black >90 >60 mL/min/1.7m2    Calcium 8.2 (L) 8.5 - 10.1 mg/dL     *Note: Due to a large number of results and/or encounters for the requested time period, some results have not been displayed. A complete set of results can be found in Results Review.         Assessment and Plan:    Dizziness/vertigo, falls: She has had 3 falls since last visit, all occurring after standing from sitting. No orthostasis on VS today, although BP slightly lower than her norm, with systolic BP in low 100s. Neuro exam unremarkable. Her last LP on 1/19 was negative. Maybe secondary to diuretics.   --Hold lasix and potassium  --Head CT w/o contrast negative for acute intracranial pathology    DLBCL, \"double-hit\"-like, currently on treatment with DA-R-EPOCH  Now s/p 6 cycles with CR on PET/CT after 4.   --Neulasta given on 1/24  --She is set up for labs on 2/1, 2/5 and 2/8. Will add Jessica Kenny on 2/1 with transfusion support as she may need platelets.  --PET/CT on 2/27 and Dr. Sauceda on 2/28    Heme: Hgb 9.5, WBC 0.1, Platelets 39. Continue twice weekly checks upon d/c and transfuse if Hgb<8 and Plt<20k.      ID: No active concerns. Neutropenic  Continue ppx ACV 400mg BID, Levaquin 250mg daily and fluconazole 200mg daily   --She is applying neosporin to abrasion on left hand.    Edema: Peripheral edema improved since discharge. Hold Lasix. Continue HCTZ, as this medication was started by Dr. Castro to help reduce urinary calcium excretion. Wearing compression stockings.     Sore throat: likely mucositis as her counts are low. Recommend salt/soda rinses. She also has MMW " at home.     History of stage 1, ER/MD+, HER2- breast cancer s/p bilateral mastectomies and BENJIE/BSO  Follows with Dr. Crawley. Oncotype recurrence score 11%. Was initially on Arimidex ([id number]) but changed to Tamoxifen due to arthralgias. Tamoxifen held since 10/5 and continue to hold with chemotherapy. Last f/u Dr. Crawley was on 11/27/17.     History of papillary thyroid cancer, s/p total thyroidectomy  Follows with Dr. Castro. Has post surgical hypothyroidism. Continue levothyroxine and calcitriol as prescribed. Neck u/s on 11/2 shows no evidence of disease.     History of Rachael en Y gastric bypass  Continue supplements (calcium citrate-vitamin D, vitamin D3, magnesium citrate). Also has iron deficiency anemia likely from poor absorption and per Dr. Sauceda, she should continue 325mg PO ferrous sulfate TID.      Chronic chest wall pain s/p mastectomies  Follows with Dr. Benitez. Palliative care to continue to prescribe pain medications. Taking MS Contin 30/30/60 mg tid and oxycodone prn, currently 15 mg qid for breakthrough pain, and celebrex.     Nausea. Mild. No vomiting. Continue Zofran, compazine prn.     Constipation.  Under control with Colace, fiber tabs, and MiraLax when needed.     Selina Elizondo PA-C  [facility]  [address], [place] [id number] [phone]

## 2018-01-29 NOTE — PATIENT INSTRUCTIONS
Contact Numbers  [facility]: [phone]    After Hours:  [phone]  Triage: [phone]    Please call the [facility] Triage line if you experience a temperature greater than or equal to 100.5, shaking chills, have uncontrolled nausea, vomiting and/or diarrhea, dizziness, shortness of breath, chest pain, bleeding, unexplained bruising, or if you have any other new/concerning symptoms, questions or concerns.     If it is after hours, weekends, or holidays, please call the main hospital  at  [phone] and ask to speak to the Oncology doctor on call.     If you are having any concerning symptoms or wish to speak to a provider before your next infusion visit, please call your care coordinator or triage to notify them so we can adequately serve you.     If you need a refill on a narcotic prescription or other medication, please call triage before your infusion appointment.           January 2018 Sunday Monday Tuesday Wednesday Thursday Friday Saturday        1     2     [facility]    9:15 AM   (15 min.)    [facility]     [facility] ONC INFUSION 360    9:30 AM   (360 min.)    ONCOLOGY INFUSION   [facility] 3     [facility]    8:00 AM   (15 min.)    [facility]     [facility] INJECTION    8:45 AM   (30 min.)   Nurse,  Oncology Injection   [facility] 4     5     [facility]RAW    9:30 AM   (15 min.)    [facility] DRAW   [facility] Draw 6       7     8     9     [facility]    7:15 AM   (15 min.)    [facility]     [facility] RETURN    7:35 AM   (50 min.)   Rashida Jaffe PA-C   [facility] 10     [facility] DRAW    6:30 AM   (15 min.)    [facility] DRAW   [facility] Draw     [facility] ONC INFUSION 360    7:00 AM   (360 min.)    ONCOLOGY INFUSION   [facility]  11     12     [facility] DRAW    8:30 AM   (15 min.)   [facility] 13       14     15     [facility] D[facility]    8:30 AM   (15 min.)   [facility]   [facility] Draw     [facility] ONC INFUSION 360    9:00 AM   (360 min.)   [facility] ONCOLOGY INFUSION   [facility]     [facility] DRAW   10:00 AM   (15 min.)   [facility]   [facility] Draw 16     17     18     [facility] DRAW    8:00 AM   (15 min.)   [facility]   [facility] Draw     [facility] RETURN    8:15 AM   (50 min.)   Rashida Jaffe PA-C   [facility]     Admission    4:22 PM   Jessica Bowers MD   [address] [facility]   (Discharge: 1/22/2018) 19     XR LP INTRATHECAL CHEMO ADMIN    1:05 PM   (60 min.)   [email or web address]   [facility] [place],  Radiology 20       21     22     23     24     [facility]RAW    9:45 AM   (15 min.)   [facility] D[facility]   [facility] Draw     [facility] RETURN   10:15 AM   (50 min.)   Selina Elizondo PA   [facility] 25     26     27       28     29     [facility] DRAW   10:15 AM   (15 min.)   [facility]   [facility] Draw     [facility] ONC INFUSION 360   11:00 AM   (360 min.)   [facility] ONCOLOGY INFUSION   [facility]     [facility] RETURN   12:15 PM   (50 min.)   Selina Elizondo PA   [facility]     CT HEAD WO    8:00 PM   (20 min.)   [email or web address]   [facility] CT 30     31 February 2018 Sunday Monday Tuesday Wednesday Thursday Friday Saturday                       1     [facility] DRAW   10:00 AM   (15 min.)   [facility]   [facility] Draw     [facility] RETURN   10:15 AM   (50 min.)   Selina Elizondo PA   [facility] 2     3       4     5     [facility] DRAW    8:30 AM   (15 min.)   [facility] DRAW   [facility] Draw 6     7     8     [facility] DRAW    8:30 AM   (15 min.)    [facility] DRAW   [facility] Draw 9     10       11     12     13     14     15     16     17       18     19     20     21     22     23     24       25     26     27     PET ONCOLOGY WHOLE BODY    6:30 AM   (45 min.)   [email or web address]   [facility] [place] PET CT 28     [facility] RETURN    9:15 AM   (30 min.)   Edu Benitez MD   [facility]     [email or web address] ONC RETURN   10:15 AM   (30 min.)   Garima Sauceda MD   [facility] Blood and Marrow Transplant                             Lab Results:  Recent Results (from the past 12 hour(s))   CBC with platelets differential    Collection Time: 01/29/18 11:01 AM   Result Value Ref Range    WBC 0.1 (LL) 4.0 - 11.0 10e9/L    RBC Count 2.84 (L) 3.8 - 5.2 10e12/L    Hemoglobin 9.5 (L) 11.7 - 15.7 g/dL    Hematocrit 28.1 (L) 35.0 - 47.0 %    MCV 99 78 - 100 fl    MCH 33.5 (H) 26.5 - 33.0 pg    MCHC 33.8 31.5 - 36.5 g/dL    RDW 15.6 (H) 10.0 - 15.0 %    Platelet Count 39 (LL) 150 - 450 10e9/L    Diff Method WBC <0.5, Diff not done    ABO/Rh type and screen    Collection Time: 01/29/18 11:01 AM   Result Value Ref Range    ABO O     RH(D) Pos     Antibody Screen Neg     Test Valid Only At          [facility]    Specimen Expires 02/01/2018    Basic metabolic panel    Collection Time: 01/29/18 11:51 AM   Result Value Ref Range    Sodium 133 133 - 144 mmol/L    Potassium 4.1 3.4 - 5.3 mmol/L    Chloride 98 94 - 109 mmol/L    Carbon Dioxide 29 20 - 32 mmol/L    Anion Gap 6 3 - 14 mmol/L    Glucose 131 (H) 70 - 99 mg/dL    Urea Nitrogen 21 7 - 30 mg/dL    Creatinine 0.66 0.52 - 1.04 mg/dL    GFR Estimate >90 >60 mL/min/1.7m2    GFR Estimate If Black >90 >60 mL/min/1.7m2    Calcium 8.2 (L) 8.5 - 10.1 mg/dL        Continue taking Levaquin and Fluconazole. STOP taking Lasix.

## 2018-01-29 NOTE — LETTER
"1/29/2018      RE: Tisha Arias  [address] [place] [address]       Oncology/Hematology Visit Note  Jan 29, 2018    Reason for Visit: follow up of DLBCL s/p C6 DA R-EPOCH. 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 She is currently undergoing treatment with DA-R-EPOCH. Her past medical history is significant for breast cancer in 2011 s/p bilateral mastectomies and BENJIE/BSO up until recently on Tamoxifen, papillary thyroid cancer s/p total thyroidectomy, chronic chest wall pain, and asthma. Briefly, her oncologic history is as follows:     She presented in 8/2017 with dramatically worse chest and back pain. CT 9/1/17 showed lytic lesion right 10th rib extending to right T9-10 neural foramen with vertebral body invasion. There was associated conglomerate of lymphadenopathy around the mid T-spine. She had a CT guided biopsy showing B-cell high grade lymphoma. Pathology showed \"The morphology shows a population of large cells, diffuse lymphoid infiltrate. The cells are atypical with abundant mitotic and apoptotic activity and single cell necrosis. Tumor is CD45 and CD79a positive and focally weakly CD30 positive. The other stains revealed positivity for CD20, BCL6, BCL2 and MUM1, and CD10 and CD21 negative. The CD5 and CD3 highlighted background T-cells.  MYC expression was equivocal with approximately 40% nuclei staining.  Ki-67 proliferative index is greater than 90-95%. The immunohistochemistry is suggestive of non-GCB immunophenotype of diffuse large B-cell lymphoma. The cytogenetics did not show rearrangement of the BCL2 or c-MYC. There is the presence of BCL6 rearrangement in 78% of cells and gains of MYC and BCL2, but no amplifications.\"      Staging LP and BMBx were negative for lymphoma. She started DA-REPOCH 10/6/17. She did well with chemo other than rigors " during [facility]. D/c 10/11/17. Given Neulasta 10/12/17. Post cycle 1 complicated by mucositis and worsening of chronic LE peripheral edema. She began cycle 2 on 10/27/17. Due to a rise in her LD and uric acid levels on that admission day, she underwent a CT scan which showed response to therapy with improvement in her mediastinal lymphadenopathy and right chest wall mass. She had a CR following 4 cycles on PET/CT 12/21.     Interval History:  [place] states she has fallen 3 times since I last saw her. She was getting out of the care when she fell and hit the pavement in the parking lot, striking her face and sustaining an abrasion on her upper lip and on lateral edge of her left hand. She denies loss of consciousness. She did feel dizziness and vertigo at the time. She fell again when getting off a circular seat in the lobby and again at home when getting up out of a chair. She denies any headaches, tinnitus, hearing loss, double vision, numbness/tingling or weakness of any extremity. Denies any abnormalities of gait or difficulty with coordination. She denies any recent fever, chills, SOB, chest pain, cough, sinus congestion, dysuria. Not much appetite, but making herself eat/drink. She has mild nausea, no vomiting. Denies abdominal pain, diarrhea. She has been taking lasix for edema and this has been stable. Denies any bleeding. Has had a few bruises from falls and some small ecchymoses of right forearm without any recall of trauma to that arm.  She has had some pain in the right side of her throat recently, but able to tolerate food and drink without difficulty. Denies any mucositis in mouth or dysphagia.    Current Outpatient Prescriptions   Medication Sig Dispense Refill     cromolyn sodium (NASALCROM) 5.2 MG/ACT AERS Inhaler SPRAY ONE SPRAY( 1 ML) IN NOSTRIL DAILY 1 Bottle 6     lidocaine-prilocaine (EMLA) cream Apply topically as needed (port access pain.) 30 g 1     diazepam (VALIUM) 5 MG tablet Take 1 tablet  (5 mg) by mouth 3 times daily For muscle spasms       [employer] FIBER GUMMY BEARS CHILDREN CHEW Take 5 g by mouth daily (2 gummy= 5 g =1 serving) 120 tablet 3     methocarbamol (ROBAXIN) 750 MG tablet Take 1 tablet (750 mg) by mouth 4 times daily . Ok to take a 5th Robaxin on very severe days.       diclofenac (VOLTAREN) 1 % GEL topical gel Apply affected area two times daily PRN using enclosed dosing card. 100 g 0     oxyCODONE IR (ROXICODONE) 30 MG tablet Take 1 tablet (30 mg) by mouth every 4 hours as needed for moderate to severe pain (Patient taking differently: Take 30 mg by mouth every 4 hours ) 30 tablet 0     celecoxib (CELEBREX) 200 MG capsule Take 1 capsule (200 mg) by mouth 2 times daily 56 capsule 0     ondansetron (ZOFRAN-ODT) 8 MG ODT tab Take 1 tablet (8 mg) by mouth every 8 hours as needed (Patient taking differently: Take 8 mg by mouth every 8 hours ) 30 tablet 1     fluconazole (DIFLUCAN) 200 MG tablet Take 1 tablet (200 mg) by mouth daily 30 tablet 0     cetirizine (ZYRTEC ALLERGY) 10 MG tablet Take 1 tablet (10 mg) by mouth 3 times daily On hold for lab test. 90 tablet 0     acyclovir (ZOVIRAX) 400 MG tablet Take 1 tablet (400 mg) by mouth 2 times daily 60 tablet 0     dexamethasone (DECADRON) 4 MG tablet Take 2 tablets (8 mg) by mouth daily for 2 days Please take on 1/23 and 1/24. 4 tablet 0     hydrochlorothiazide (MICROZIDE) 12.5 MG capsule Take 1 capsule (12.5 mg) by mouth daily 30 capsule 0     levofloxacin (LEVAQUIN) 250 MG tablet Take 1 tablet (250 mg) by mouth daily 30 tablet 0     calcium citrate-vitamin D (CALCIUM CITRATE +D) 315-250 MG-UNIT TABS per tablet Take 2 tablets by mouth twice a week 20 tablet 0     cyclobenzaprine (FLEXERIL) 10 MG tablet Take 1 tablet (10 mg) by mouth 3 times daily as needed (Patient taking differently: Take 10 mg by mouth 3 times daily ) 30 tablet 0     ranitidine (ZANTAC) 75 MG tablet Take 1 tablet (75 mg) by mouth 3 times daily 90 tablet 0     Cholecalciferol  (VITAMIN D3) 2000 UNITS CAPS Take 5,000 Units by mouth daily Takes 2 tabs daily 60 capsule 0     furosemide (LASIX) 20 MG tablet Take 1 tablet (20 mg) by mouth 2 times daily 60 tablet 0     calcitRIOL (ROCALTROL) 0.25 MCG capsule TAKE 2 CAPSULES BY MOUTH EVERY MORNING AND TAKE 1 CAPSULE BY MOUTH EVERY NIGHT AT BEDTIME (Patient taking differently: Take 0.25 mcg by mouth 2 times daily TAKE 2 CAPSULES BY MOUTH EVERY MORNING AND TAKE 1 CAPSULE BY MOUTH EVERY NIGHT AT NOON.) 90 capsule 0     cromolyn (OPTICROM) 4 % ophthalmic solution Place 1 drop into both eyes 4 times daily 10 mL 0     montelukast (SINGULAIR) 10 MG tablet Take 2 tablets (20 mg) by mouth 2 times daily 60 tablet 0     potassium chloride SA (KLOR-CON) 20 MEQ CR tablet Take 1 tablet (20 mEq) by mouth 2 times daily 30 tablet 0     morphine (MS CONTIN) 30 MG 12 hr tablet Take 1 tablet (30 mg) by mouth every 8 hours . Take an addition pill at night such that your evening dose is 60mg 120 tablet 0     magic mouthwash suspension (diphenhydrAMINE, lidocaine, aluminum-magnesium & simethicone) Swish and spit 10 mLs in mouth every 6 hours as needed for mouth sores 360 mL 1     prochlorperazine (COMPAZINE) 10 MG tablet Take 10 mg by mouth as needed  3     SUMAtriptan (IMITREX) 50 MG tablet Take 50 mg by mouth as needed  3     acetaminophen (TYLENOL) 325 MG tablet Take 2 tablets (650 mg) by mouth every 4 hours as needed for mild pain or fever 100 tablet      triamcinolone (KENALOG) 0.025 % ointment Apply topically as needed  3     lidocaine (XYLOCAINE) 5 % ointment Apply quarter size amount to chest and back up to 3 times daily as needed for pain. 350 g 1     bisacodyl (DULCOLAX) 5 MG EC tablet Take 3 tablets (15 mg) by mouth daily as needed for constipation 30 tablet 0     polyethylene glycol (MIRALAX) powder Take 17 g (1 capful) by mouth daily 510 g 0     senna-docusate (SENOKOT-S;PERICOLACE) 8.6-50 MG per tablet Take 1-2 tablets by mouth 2 times daily as needed  for constipation 60 tablet 0     UNABLE TO FIND Take 2 capsules by mouth 3 times daily Muscle Mag. 2 caps contain B1 20mg, B2 20mg, B6 10mg, magesium 20mg, manganese 2mg.       order for DME Compression stockings, medium grade, please measure and fit. Please dispense a pair. 1 Units 0     COMPOUND CONTAINING CONTROLLED SUBSTANCE (CMPD RX) - PHARMACY TO MIX COMPOUNDED MEDICATION Apply small amount to affected area two times daily. (Patient not taking: Reported on 1/24/2018) 120 g 6     levothyroxine (SYNTHROID/LEVOTHROID) 112 MCG tablet Mon-Sat: (2 tabs daily) Sunday: 3 tabs (Patient taking differently: 112 mcg Mon-Sat: (2 tabs daily) Sunday: 3 tabs) 189 tablet 3     EPINEPHrine (EPIPEN 2-CARIDAD) 0.3 MG/0.3ML injection Inject 0.3 mLs (0.3 mg) into the muscle once as needed for anaphylaxis (Patient not taking: Reported on 1/24/2018) 0.6 mL 3     VENTOLIN  (90 BASE) MCG/ACT Inhaler INHALE 2 PUFFS INTO THE LUNGS EVERY 4 HOURS AS NEEDED FOR SHORTNESS OF BREATH OR DIFFICULT BREATHING OR WHEEZING 18 g 3     order for DME Equipment being ordered: compression stockings. 20-30 mm or 30 - 40 mm as patient can tolerate. Physical therapy to determine if they should be above or below the knee. 2 Units 4     order for DME Equipment being ordered: compression bra 2 Device 1     aluminum chloride (DRYSOL) 20 % external solution Apply topically At Bedtime 60 mL 3     UNABLE TO FIND 3 tablets 3 times daily MEDICATION NAME: calcium D-Glucarate   3 caps contain 180mg of elemental calcium.       Triamcinolone Acetonide (AZMACORT IN) Inhale 2 puffs into the lungs as needed       UNABLE TO FIND 2 tablets 3 times daily MEDICATION NAME: Digestzymes        UNABLE TO FIND 1 tablet daily MEDICATION NAME: Pure Encapsulations       Probiotic Product (PROBIOTIC DAILY PO) Take 1 capsule by mouth daily Lacto acid bifidobacterium         Physical Examination:  General: The patient is a pleasant female in no acute distress.  There were no vitals  taken for this visit.  Wt Readings from Last 10 Encounters:   01/29/18 81.1 kg (178 lb 14.4 oz)   01/24/18 86.3 kg (190 lb 4.8 oz)   01/22/18 87.2 kg (192 lb 3.9 oz)   01/18/18 88.2 kg (194 lb 8 oz)   01/15/18 86.1 kg (189 lb 14.4 oz)   01/09/18 82.3 kg (181 lb 8 oz)   01/03/18 85.7 kg (189 lb)   01/01/18 87.5 kg (193 lb)   12/26/17 85.5 kg (188 lb 7.9 oz)   12/19/17 83.8 kg (184 lb 11.2 oz)     HEENT: EOMI, PERRL. Sclerae are anicteric. Oral mucosa is pink and moist with no lesions or thrush. Visible posterior pharynx is without erythema.  Lymph: Neck is supple with no lymphadenopathy in the cervical or supraclavicular areas.   Heart: Regular rate and rhythm.   Lungs: Clear to auscultation bilaterally.   Abdomen: Bowel sounds present, soft, nontender with no palpable hepatosplenomegaly or masses.   Extremities: 1+ pitting edema to distal 1/3 of lower extremities.  Neuro: Cranial nerves II through XII are grossly intact. Strength 5/5 in UE and LEs and symmetric. No dysmetria on F->N or H->. Able to perform rapid alternating hand movements. Normal gait. She does become unsteady with tandem heel-toe walk. No sway on Romberg.  Skin: Abrasion on upper lip, left lateral edge of hand. Small 2-3 ecchymotic lesions about 2-3mm on right forearm. No rashes, petechiae, or bruising noted on exposed skin.    Laboratory Data:  Results for orders placed or performed in visit on 01/29/18 (from the past 24 hour(s))   CBC with platelets differential   Result Value Ref Range    WBC 0.1 (LL) 4.0 - 11.0 10e9/L    RBC Count 2.84 (L) 3.8 - 5.2 10e12/L    Hemoglobin 9.5 (L) 11.7 - 15.7 g/dL    Hematocrit 28.1 (L) 35.0 - 47.0 %    MCV 99 78 - 100 fl    MCH 33.5 (H) 26.5 - 33.0 pg    MCHC 33.8 31.5 - 36.5 g/dL    RDW 15.6 (H) 10.0 - 15.0 %    Platelet Count 39 (LL) 150 - 450 10e9/L    Diff Method WBC <0.5, Diff not done    ABO/Rh type and screen   Result Value Ref Range    ABO O     RH(D) Pos     Antibody Screen Neg     Test Valid Only At     "      [facility]    Specimen Expires 02/01/2018    Basic metabolic panel   Result Value Ref Range    Sodium 133 133 - 144 mmol/L    Potassium 4.1 3.4 - 5.3 mmol/L    Chloride 98 94 - 109 mmol/L    Carbon Dioxide 29 20 - 32 mmol/L    Anion Gap 6 3 - 14 mmol/L    Glucose 131 (H) 70 - 99 mg/dL    Urea Nitrogen 21 7 - 30 mg/dL    Creatinine 0.66 0.52 - 1.04 mg/dL    GFR Estimate >90 >60 mL/min/1.7m2    GFR Estimate If Black >90 >60 mL/min/1.7m2    Calcium 8.2 (L) 8.5 - 10.1 mg/dL     *Note: Due to a large number of results and/or encounters for the requested time period, some results have not been displayed. A complete set of results can be found in Results Review.         Assessment and Plan:    Dizziness/vertigo, falls: She has had 3 falls since last visit, all occurring after standing from sitting. No orthostasis on VS today, although BP slightly lower than her norm, with systolic BP in low 100s. Neuro exam unremarkable. Her last LP on 1/19 was negative. Maybe secondary to diuretics.   --Hold lasix and potassium  --Head CT w/o contrast negative for acute intracranial pathology    DLBCL, \"double-hit\"-like, currently on treatment with DA-R-EPOCH  Now s/p 6 cycles with CR on PET/CT after 4.   --Neulasta given on 1/24  --She is set up for labs on 2/1, 2/5 and 2/8. Will add Jessica Kenny on 2/1 with transfusion support as she may need platelets.  --PET/CT on 2/27 and Dr. Sauceda on 2/28    Heme: Hgb 9.5, WBC 0.1, Platelets 39. Continue twice weekly checks upon d/c and transfuse if Hgb<8 and Plt<20k.      ID: No active concerns. Neutropenic  Continue ppx ACV 400mg BID, Levaquin 250mg daily and fluconazole 200mg daily   --She is applying neosporin to abrasion on left hand.    Edema: Peripheral edema improved since discharge. Hold Lasix. Continue HCTZ, as this medication was started by Dr. Castro to help reduce urinary calcium excretion. Wearing compression stockings. "     Sore throat: likely mucositis as her counts are low. Recommend salt/soda rinses. She also has MMW at home.     History of stage 1, ER/OK+, HER2- breast cancer s/p bilateral mastectomies and BENJIE/BSO  Follows with Dr. Crawley. Oncotype recurrence score 11%. Was initially on Arimidex ([id number]) but changed to Tamoxifen due to arthralgias. Tamoxifen held since 10/5 and continue to hold with chemotherapy. Last f/u Dr. Crawley was on 11/27/17.     History of papillary thyroid cancer, s/p total thyroidectomy  Follows with Dr. Castro. Has post surgical hypothyroidism. Continue levothyroxine and calcitriol as prescribed. Neck u/s on 11/2 shows no evidence of disease.     History of Rachael en Y gastric bypass  Continue supplements (calcium citrate-vitamin D, vitamin D3, magnesium citrate). Also has iron deficiency anemia likely from poor absorption and per Dr. Sauceda, she should continue 325mg PO ferrous sulfate TID.      Chronic chest wall pain s/p mastectomies  Follows with Dr. Benitez. Palliative care to continue to prescribe pain medications. Taking MS Contin 30/30/60 mg tid and oxycodone prn, currently 15 mg qid for breakthrough pain, and celebrex.     Nausea. Mild. No vomiting. Continue Zofran, compazine prn.     Constipation.  Under control with Colace, fiber tabs, and MiraLax when needed.     Selina Elizondo PA-C  [facility]  [address], [place] [address] [phone]      COTY Naidu

## 2018-01-29 NOTE — NURSING NOTE
"Chief Complaint   Patient presents with     Port Draw     port accessed and labs drawn by rn.  vs taken.     Port accessed with 20g 3/4\" gripper needle, labs drawn, port flushed with saline and heparin, vitals checked, pt checked in for next appointment.  Ioana Bragg RN    "

## 2018-01-29 NOTE — LETTER
January 29, 2018      RE: Tisha Arias  [address]  [place] [address]          To whom it may concern,    Elinor was seen in clinic today and the following determination was made:     Please excuse Tisha from work on 1/29-2/1/18.        Sincerely,      Selina Elizondo PA-C

## 2018-01-30 NOTE — PROGRESS NOTES
Late entry:   1/29/18 Left message for pt re: neutropenic precautions and to continue prophy acyclovir, fluconazole and levofloxacin per Jessica Cox PA-C's request. Also indicated pt should report s/sx of abnormal bleeding or bruising and that plt transfusion appt on 2/1 has not been scheduled yet, scheduling is working on this per Katy's check out note today.  Pt was also seen by COTY Chadwick yesterday and these labs were addressed at that time.   I left detailed information for pt re: next appt as below and to call with questions/concerns in the interim.   [facility] 2/01/18 10:00 AM [facility]  [facility]                 [facility] 2/01/18 10:30 AM [facility] RETURN KATY HARRISON

## 2018-01-31 LAB
ABO + RH BLD: NORMAL
ABO + RH BLD: NORMAL
BLD GP AB SCN SERPL QL: NORMAL
BLD PROD TYP BPU: NORMAL
BLD PROD TYP BPU: NORMAL
BLOOD BANK CMNT PATIENT-IMP: NORMAL
NUM BPU REQUESTED: 1
NUM BPU REQUESTED: 2
SPECIMEN EXP DATE BLD: NORMAL

## 2018-02-01 ENCOUNTER — ONCOLOGY VISIT (OUTPATIENT)
Dept: ONCOLOGY | Facility: CLINIC | Age: 58
End: 2018-02-01
Attending: PHYSICIAN ASSISTANT
Payer: COMMERCIAL

## 2018-02-01 ENCOUNTER — INFUSION THERAPY VISIT (OUTPATIENT)
Dept: TRANSPLANT | Facility: CLINIC | Age: 58
End: 2018-02-01
Attending: INTERNAL MEDICINE
Payer: COMMERCIAL

## 2018-02-01 ENCOUNTER — RECORDS - HEALTHEAST (OUTPATIENT)
Dept: ADMINISTRATIVE | Facility: OTHER | Age: 58
End: 2018-02-01

## 2018-02-01 ENCOUNTER — APPOINTMENT (OUTPATIENT)
Dept: LAB | Facility: CLINIC | Age: 58
End: 2018-02-01
Payer: COMMERCIAL

## 2018-02-01 VITALS
TEMPERATURE: 97.8 F | DIASTOLIC BLOOD PRESSURE: 70 MMHG | SYSTOLIC BLOOD PRESSURE: 105 MMHG | OXYGEN SATURATION: 96 % | HEART RATE: 91 BPM | RESPIRATION RATE: 16 BRPM

## 2018-02-01 VITALS
RESPIRATION RATE: 16 BRPM | DIASTOLIC BLOOD PRESSURE: 79 MMHG | TEMPERATURE: 97.8 F | HEIGHT: 65 IN | HEART RATE: 123 BPM | SYSTOLIC BLOOD PRESSURE: 141 MMHG | BODY MASS INDEX: 31.9 KG/M2 | OXYGEN SATURATION: 100 % | WEIGHT: 191.5 LBS

## 2018-02-01 DIAGNOSIS — C83.30 DIFFUSE LARGE B-CELL LYMPHOMA, UNSPECIFIED BODY REGION (H): Primary | ICD-10-CM

## 2018-02-01 DIAGNOSIS — C85.10 HIGH GRADE B-CELL LYMPHOMA (H): ICD-10-CM

## 2018-02-01 DIAGNOSIS — C83.33 DIFFUSE LARGE B-CELL LYMPHOMA OF INTRA-ABDOMINAL LYMPH NODES (H): Primary | ICD-10-CM

## 2018-02-01 DIAGNOSIS — C83.30 DIFFUSE LARGE B-CELL LYMPHOMA, UNSPECIFIED BODY REGION (H): ICD-10-CM

## 2018-02-01 LAB
ANISOCYTOSIS BLD QL SMEAR: SLIGHT
BASOPHILS # BLD AUTO: 0.1 10E9/L (ref 0–0.2)
BASOPHILS NFR BLD AUTO: 0.9 %
BLD PROD TYP BPU: NORMAL
BLD UNIT ID BPU: 0
BLOOD PRODUCT CODE: NORMAL
BPU ID: NORMAL
DACRYOCYTES BLD QL SMEAR: SLIGHT
DIFFERENTIAL METHOD BLD: ABNORMAL
EOSINOPHIL # BLD AUTO: 0 10E9/L (ref 0–0.7)
EOSINOPHIL NFR BLD AUTO: 0 %
ERYTHROCYTE [DISTWIDTH] IN BLOOD BY AUTOMATED COUNT: 16.3 % (ref 10–15)
HCT VFR BLD AUTO: 24.1 % (ref 35–47)
HGB BLD-MCNC: 7.8 G/DL (ref 11.7–15.7)
LYMPHOCYTES # BLD AUTO: 0.3 10E9/L (ref 0.8–5.3)
LYMPHOCYTES NFR BLD AUTO: 5.3 %
MCH RBC QN AUTO: 33.6 PG (ref 26.5–33)
MCHC RBC AUTO-ENTMCNC: 32.4 G/DL (ref 31.5–36.5)
MCV RBC AUTO: 104 FL (ref 78–100)
MICROCYTES BLD QL SMEAR: PRESENT
MONOCYTES # BLD AUTO: 0.5 10E9/L (ref 0–1.3)
MONOCYTES NFR BLD AUTO: 8 %
NEUTROPHILS # BLD AUTO: 4.9 10E9/L (ref 1.6–8.3)
NEUTROPHILS NFR BLD AUTO: 85.8 %
NRBC # BLD AUTO: 0.1 10*3/UL
NRBC BLD AUTO-RTO: 1 /100
PLATELET # BLD AUTO: 46 10E9/L (ref 150–450)
PLATELET # BLD EST: ABNORMAL 10*3/UL
POIKILOCYTOSIS BLD QL SMEAR: ABNORMAL
POLYCHROMASIA BLD QL SMEAR: SLIGHT
RBC # BLD AUTO: 2.32 10E12/L (ref 3.8–5.2)
TRANSFUSION STATUS PATIENT QL: NORMAL
WBC # BLD AUTO: 5.7 10E9/L (ref 4–11)

## 2018-02-01 PROCEDURE — G0463 HOSPITAL OUTPT CLINIC VISIT: HCPCS | Mod: 25

## 2018-02-01 PROCEDURE — 36430 TRANSFUSION BLD/BLD COMPNT: CPT

## 2018-02-01 PROCEDURE — G0463 HOSPITAL OUTPT CLINIC VISIT: HCPCS | Mod: ZF

## 2018-02-01 PROCEDURE — 36591 DRAW BLOOD OFF VENOUS DEVICE: CPT

## 2018-02-01 PROCEDURE — P9016 RBC LEUKOCYTES REDUCED: HCPCS

## 2018-02-01 PROCEDURE — 25000128 H RX IP 250 OP 636: Mod: ZF | Performed by: PHYSICIAN ASSISTANT

## 2018-02-01 PROCEDURE — 85025 COMPLETE CBC W/AUTO DIFF WBC: CPT | Performed by: PHYSICIAN ASSISTANT

## 2018-02-01 PROCEDURE — 99214 OFFICE O/P EST MOD 30 MIN: CPT | Mod: ZP | Performed by: PHYSICIAN ASSISTANT

## 2018-02-01 RX ORDER — HEPARIN SODIUM (PORCINE) LOCK FLUSH IV SOLN 100 UNIT/ML 100 UNIT/ML
5 SOLUTION INTRAVENOUS
Status: COMPLETED | OUTPATIENT
Start: 2018-02-01 | End: 2018-02-01

## 2018-02-01 RX ADMIN — SODIUM CHLORIDE, PRESERVATIVE FREE 5 ML: 5 INJECTION INTRAVENOUS at 10:22

## 2018-02-01 RX ADMIN — SODIUM CHLORIDE, PRESERVATIVE FREE 5 ML: 5 INJECTION INTRAVENOUS at 14:39

## 2018-02-01 ASSESSMENT — PAIN SCALES - GENERAL: PAINLEVEL: EXTREME PAIN (8)

## 2018-02-01 NOTE — PROGRESS NOTES
"Oncology/Hematology Visit Note  Feb 1, 2018    Reason for Visit: follow up of DLBCL s/p C6 DA R-EPOCH. 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 She is currently undergoing treatment with DA-R-EPOCH. Her past medical history is significant for breast cancer in 2011 s/p bilateral mastectomies and BENJIE/BSO up until recently on Tamoxifen, papillary thyroid cancer s/p total thyroidectomy, chronic chest wall pain, and asthma. Briefly, her oncologic history is as follows:     She presented in 8/2017 with dramatically worse chest and back pain. CT 9/1/17 showed lytic lesion right 10th rib extending to right T9-10 neural foramen with vertebral body invasion. There was associated conglomerate of lymphadenopathy around the mid T-spine. She had a CT guided biopsy showing B-cell high grade lymphoma. Pathology showed \"The morphology shows a population of large cells, diffuse lymphoid infiltrate. The cells are atypical with abundant mitotic and apoptotic activity and single cell necrosis. Tumor is CD45 and CD79a positive and focally weakly CD30 positive. The other stains revealed positivity for CD20, BCL6, BCL2 and MUM1, and CD10 and CD21 negative. The CD5 and CD3 highlighted background T-cells.  MYC expression was equivocal with approximately 40% nuclei staining.  Ki-67 proliferative index is greater than 90-95%. The immunohistochemistry is suggestive of non-GCB immunophenotype of diffuse large B-cell lymphoma. The cytogenetics did not show rearrangement of the BCL2 or c-MYC. There is the presence of BCL6 rearrangement in 78% of cells and gains of MYC and BCL2, but no amplifications.\"      Staging LP and BMBx were negative for lymphoma. She started DA-REPOCH 10/6/17. She did well with chemo other than rigors during CIVI. D/c 10/11/17. Given Neulasta 10/12/17. Post cycle 1 complicated by mucositis and worsening " of chronic LE peripheral edema. She began cycle 2 on 10/27/17. Due to a rise in her LD and uric acid levels on that admission day, she underwent a CT scan which showed response to therapy with improvement in her mediastinal lymphadenopathy and right chest wall mass. She had a CR following 4 cycles on PET/CT 12/21.     Interval History:  Elinor has been holding her lasix since I saw her on 1/29 and tells me she has had no further falls. She did get lightheaded yesterday evening when she went home and also today when getting out of the car and she felt the cold outside air hit her. She has had no further episodes of vertigo. She does feel as though her legs feel weak overall with walking. The edema in her legs is a little worse after stopping lasix, but better overall than on it has been in past. She had an episode of back pain today which felt like a spasm up and down her entire spine that was transient. She currently denies any pain. She still has mild nausea, but no vomiting. She takes Zofran when needed. Continues to have some intermittent constipation but using miralax as needed. She does not have much appetite but is making effort to eat. She has area of soreness on inner lower lip but has been using biotene and salt/soda rinses.    Current Outpatient Prescriptions   Medication Sig Dispense Refill     cromolyn sodium (NASALCROM) 5.2 MG/ACT AERS Inhaler SPRAY ONE SPRAY( 1 ML) IN NOSTRIL DAILY 1 Bottle 6     lidocaine-prilocaine (EMLA) cream Apply topically as needed (port access pain.) 30 g 1     diazepam (VALIUM) 5 MG tablet Take 1 tablet (5 mg) by mouth 3 times daily For muscle spasms       [employer] FIBER GUMMY BEARS CHILDREN CHEW Take 5 g by mouth daily (2 gummy= 5 g =1 serving) 120 tablet 3     methocarbamol (ROBAXIN) 750 MG tablet Take 1 tablet (750 mg) by mouth 4 times daily . Ok to take a 5th Robaxin on very severe days.       diclofenac (VOLTAREN) 1 % GEL topical gel Apply affected area two times daily PRN  using enclosed dosing card. 100 g 0     oxyCODONE IR (ROXICODONE) 30 MG tablet Take 1 tablet (30 mg) by mouth every 4 hours as needed for moderate to severe pain (Patient taking differently: Take 30 mg by mouth every 4 hours ) 30 tablet 0     celecoxib (CELEBREX) 200 MG capsule Take 1 capsule (200 mg) by mouth 2 times daily 56 capsule 0     ondansetron (ZOFRAN-ODT) 8 MG ODT tab Take 1 tablet (8 mg) by mouth every 8 hours as needed (Patient taking differently: Take 8 mg by mouth every 8 hours ) 30 tablet 1     fluconazole (DIFLUCAN) 200 MG tablet Take 1 tablet (200 mg) by mouth daily 30 tablet 0     cetirizine (ZYRTEC ALLERGY) 10 MG tablet Take 1 tablet (10 mg) by mouth 3 times daily On hold for lab test. 90 tablet 0     acyclovir (ZOVIRAX) 400 MG tablet Take 1 tablet (400 mg) by mouth 2 times daily 60 tablet 0     hydrochlorothiazide (MICROZIDE) 12.5 MG capsule Take 1 capsule (12.5 mg) by mouth daily 30 capsule 0     levofloxacin (LEVAQUIN) 250 MG tablet Take 1 tablet (250 mg) by mouth daily 30 tablet 0     calcium citrate-vitamin D (CALCIUM CITRATE +D) 315-250 MG-UNIT TABS per tablet Take 2 tablets by mouth twice a week 20 tablet 0     cyclobenzaprine (FLEXERIL) 10 MG tablet Take 1 tablet (10 mg) by mouth 3 times daily as needed (Patient taking differently: Take 10 mg by mouth 3 times daily ) 30 tablet 0     ranitidine (ZANTAC) 75 MG tablet Take 1 tablet (75 mg) by mouth 3 times daily 90 tablet 0     Cholecalciferol (VITAMIN D3) 2000 UNITS CAPS Take 5,000 Units by mouth daily Takes 2 tabs daily 60 capsule 0     calcitRIOL (ROCALTROL) 0.25 MCG capsule TAKE 2 CAPSULES BY MOUTH EVERY MORNING AND TAKE 1 CAPSULE BY MOUTH EVERY NIGHT AT BEDTIME (Patient taking differently: Take 0.25 mcg by mouth 2 times daily TAKE 2 CAPSULES BY MOUTH EVERY MORNING AND TAKE 1 CAPSULE BY MOUTH EVERY NIGHT AT NOON.) 90 capsule 0     cromolyn (OPTICROM) 4 % ophthalmic solution Place 1 drop into both eyes 4 times daily 10 mL 0     montelukast  (SINGULAIR) 10 MG tablet Take 2 tablets (20 mg) by mouth 2 times daily 60 tablet 0     potassium chloride SA (KLOR-CON) 20 MEQ CR tablet Take 1 tablet (20 mEq) by mouth 2 times daily 30 tablet 0     morphine (MS CONTIN) 30 MG 12 hr tablet Take 1 tablet (30 mg) by mouth every 8 hours . Take an addition pill at night such that your evening dose is 60mg 120 tablet 0     magic mouthwash suspension (diphenhydrAMINE, lidocaine, aluminum-magnesium & simethicone) Swish and spit 10 mLs in mouth every 6 hours as needed for mouth sores 360 mL 1     prochlorperazine (COMPAZINE) 10 MG tablet Take 10 mg by mouth as needed  3     SUMAtriptan (IMITREX) 50 MG tablet Take 50 mg by mouth as needed  3     acetaminophen (TYLENOL) 325 MG tablet Take 2 tablets (650 mg) by mouth every 4 hours as needed for mild pain or fever 100 tablet      triamcinolone (KENALOG) 0.025 % ointment Apply topically as needed  3     lidocaine (XYLOCAINE) 5 % ointment Apply quarter size amount to chest and back up to 3 times daily as needed for pain. 350 g 1     bisacodyl (DULCOLAX) 5 MG EC tablet Take 3 tablets (15 mg) by mouth daily as needed for constipation 30 tablet 0     polyethylene glycol (MIRALAX) powder Take 17 g (1 capful) by mouth daily 510 g 0     senna-docusate (SENOKOT-S;PERICOLACE) 8.6-50 MG per tablet Take 1-2 tablets by mouth 2 times daily as needed for constipation 60 tablet 0     UNABLE TO FIND Take 2 capsules by mouth 3 times daily Muscle Mag. 2 caps contain B1 20mg, B2 20mg, B6 10mg, magesium 20mg, manganese 2mg.       order for DME Compression stockings, medium grade, please measure and fit. Please dispense a pair. 1 Units 0     levothyroxine (SYNTHROID/LEVOTHROID) 112 MCG tablet Mon-Sat: (2 tabs daily) Sunday: 3 tabs (Patient taking differently: 112 mcg Mon-Sat: (2 tabs daily) Sunday: 3 tabs) 189 tablet 3     VENTOLIN  (90 BASE) MCG/ACT Inhaler INHALE 2 PUFFS INTO THE LUNGS EVERY 4 HOURS AS NEEDED FOR SHORTNESS OF BREATH OR  "DIFFICULT BREATHING OR WHEEZING 18 g 3     order for DME Equipment being ordered: compression stockings. 20-30 mm or 30 - 40 mm as patient can tolerate. Physical therapy to determine if they should be above or below the knee. 2 Units 4     order for DME Equipment being ordered: compression bra 2 Device 1     aluminum chloride (DRYSOL) 20 % external solution Apply topically At Bedtime 60 mL 3     UNABLE TO FIND 3 tablets 3 times daily MEDICATION NAME: calcium D-Glucarate   3 caps contain 180mg of elemental calcium.       Triamcinolone Acetonide (AZMACORT IN) Inhale 2 puffs into the lungs as needed       UNABLE TO FIND 2 tablets 3 times daily MEDICATION NAME: Digestzymes        UNABLE TO FIND 1 tablet daily MEDICATION NAME: Pure Encapsulations       Probiotic Product (PROBIOTIC DAILY PO) Take 1 capsule by mouth daily Lacto acid bifidobacterium       dexamethasone (DECADRON) 4 MG tablet Take 2 tablets (8 mg) by mouth daily for 2 days Please take on 1/23 and 1/24. 4 tablet 0     furosemide (LASIX) 20 MG tablet Take 1 tablet (20 mg) by mouth 2 times daily (Patient not taking: Reported on 2/1/2018) 60 tablet 0     COMPOUND CONTAINING CONTROLLED SUBSTANCE (CMPD RX) - PHARMACY TO MIX COMPOUNDED MEDICATION Apply small amount to affected area two times daily. (Patient not taking: Reported on 1/24/2018) 120 g 6     EPINEPHrine (EPIPEN 2-CARIDAD) 0.3 MG/0.3ML injection Inject 0.3 mLs (0.3 mg) into the muscle once as needed for anaphylaxis (Patient not taking: Reported on 1/24/2018) 0.6 mL 3       Physical Examination:  General: The patient is a pleasant female in no acute distress.  /79 (BP Location: Left arm, Patient Position: Sitting, Cuff Size: Adult Regular)  Pulse 123  Temp 97.8  F (36.6  C) (Oral)  Resp 16  Ht 1.651 m (5' 5\")  Wt 86.9 kg (191 lb 8 oz)  SpO2 100%  BMI 31.87 kg/m2  Wt Readings from Last 10 Encounters:   02/01/18 86.9 kg (191 lb 8 oz)   01/29/18 81.1 kg (178 lb 14.4 oz)   01/24/18 86.3 kg (190 lb " 4.8 oz)   01/22/18 87.2 kg (192 lb 3.9 oz)   01/18/18 88.2 kg (194 lb 8 oz)   01/15/18 86.1 kg (189 lb 14.4 oz)   01/09/18 82.3 kg (181 lb 8 oz)   01/03/18 85.7 kg (189 lb)   01/01/18 87.5 kg (193 lb)   12/26/17 85.5 kg (188 lb 7.9 oz)     HEENT: EOMI, PERRL. Sclerae are anicteric. Oral mucosa is pink and moist. One small ulcer on mucosal surface of lower lip. No thrush.   Lymph: Neck is supple with no lymphadenopathy in the cervical or supraclavicular areas.   Heart: Regular rate and rhythm.   Lungs: Clear to auscultation bilaterally.   Abdomen: Bowel sounds present, soft, nontender with no palpable hepatosplenomegaly or masses.   Extremities: 1-2+ pitting edema to distal 1/3 of lower extremities.  Neuro: Cranial nerves II through XII are grossly intact. Grossly non-focal  Skin: Abrasion on upper lip, left lateral edge of hand. No rashes, petechiae, or bruising noted on exposed skin.    Laboratory Data:  Results for IMMANUEL LOVETT (MRN [id number]) as of 2/1/2018 14:50   2/1/2018 11:48   WBC 5.9   Hemoglobin 7.8 (L)   Hematocrit 23.3 (L)   Platelet Count 48 (LL)   RBC Count 2.30 (L)    (H)   MCH 33.9 (H)   MCHC 33.5   RDW 16.3 (H)   Diff Method Manual Differential   % Neutrophils 79.8   % Lymphocytes 11.4   % Monocytes 4.4   % Eosinophils 0.0   % Basophils 0.0   % Myelocytes 0.9   % Promyelocytes 2.6   % Other Cells 0.9   Nucleated RBCs 1 (H)   Absolute Neutrophil 4.7   Absolute Lymphocytes 0.7 (L)   Absolute Monocytes 0.3   Absolute Eosinophils 0.0   Absolute Basophils 0.0   Absolute Myelocytes 0.1 (H)   Absolute Promyelocytes 0.2 (H)   Absolute Other Cells 0.1 (H)   Absolute Nucleated RBC 0.1   Anisocytosis Slight   Poikilocytosis Slight   Polychromasia Slight   Teardrop Cells Slight       Assessment and Plan:    Dizziness/vertigo, falls: She has had 3 falls in the past week, but none since holding lasix on 1/29. All falls occurred after standing from sitting and she still gets a little lightheaded  "when standing up. No orthostasis on VS on 1/29. BP was 141/79 on first check, but was 10/59 in infusion. Neuro exam has been unremarkable. Her last LP on 1/19 was negative. CT head on 1/29 with no acute intracranial pathology. Maybe secondary to diuretics. She is also on MS Contin, oxycodone prn and scheduled valium for pain as below, which may all be contributing.  --Called by lab who are sending blood for pathology review as blast seen on peripheral smear    DLBCL, \"double-hit\"-like, currently on treatment with DA-R-EPOCH  Now s/p 6 cycles with CR on PET/CT after 4.   --Neulasta given on 1/24  --She is set up for labs on 2/5 and 2/8.   --PET/CT on 2/27 and Dr. Sauceda on 2/28    Heme: WBC 5.9, ANC 4.7, Hgb 7.8, platelets 48k. Transfuse if Hgb<8 and Plt<20k.   --Will receive 2 units pRBCs today     ID: No active concerns. No longer neutropenic  Continue ppx ACV 400mg BID  May discontinue Levaquin 250mg daily and fluconazole 200mg daily   --She is applying neosporin to abrasion on left hand.    Edema: Peripheral edema improved since discharge. Hold Lasix. Continue HCTZ, as this medication was started by Dr. Castro to help reduce urinary calcium excretion. Encouraged wearing compression stockings.     Mucositis: Small ulcer on mucosa of lower lip. Recommend salt/soda rinses. She also has MMW at home.     History of stage 1, ER/PA+, HER2- breast cancer s/p bilateral mastectomies and BENJIE/BSO  Follows with Dr. Crawley. Oncotype recurrence score 11%. Was initially on Arimidex ([id number]) but changed to Tamoxifen due to arthralgias. Tamoxifen held since 10/5 and continue to hold with chemotherapy. Last f/u Dr. Crawley was on 11/27/17.     History of papillary thyroid cancer, s/p total thyroidectomy  Follows with Dr. Castro. Has post surgical hypothyroidism. Continue levothyroxine and calcitriol as prescribed. Neck u/s on 11/2 shows no evidence of disease.     History of Rachael en Y gastric bypass  Continue supplements " (calcium citrate-vitamin D, vitamin D3, magnesium citrate). Also has iron deficiency anemia likely from poor absorption and per Dr. Sauceda, she should continue 325mg PO ferrous sulfate TID.      Chronic chest wall pain s/p mastectomies  Follows with Dr. Benitez. Palliative care to continue to prescribe pain medications. Taking MS Contin 30/30/60 mg tid and oxycodone prn, currently 15 mg qid for breakthrough pain, and celebrex.     Nausea. Mild. No vomiting. Continue Zofran, compazine prn.     Constipation.  Under control with Colace, fiber tabs, and MiraLax when needed.     Selina Elizondo PA-C  [facility]  [address] [place] [address] [phone]  [facility]

## 2018-02-01 NOTE — MR AVS SNAPSHOT
After Visit Summary   2/1/2018    Tisha Arias    MRN: [id number]           Patient Information     Date Of Birth          1960        Visit Information        Provider Department      2/1/2018 11:00 AM UC 1 ATC;  BMT INFUSION [facility] Blood and Marrow Transplant        Today's Diagnoses     Diffuse large B-cell lymphoma of intra-abdominal lymph nodes (H)    -  1    High grade B-cell lymphoma (H)        Diffuse large B-cell lymphoma, unspecified body region (H)              [facility] ([facility])  [address] [place] [address]  Phone: [phone]  Clinic Hours:   Monday-Thursday:7am to 7pm   Friday: 7am to 5pm   Weekends and holidays:    8am to noon (in general)  If your fever is 100.5  or greater,   call the clinic.  After hours call the   hospital at [phone] or   [phone]. Ask for the BMT   fellow on-call            Follow-ups after your visit        Your next 10 appointments already scheduled     Feb 05, 2018  8:00 AM CST   Level 6 with UR CH 05   [facility] [place], Infusion Services ([facility])    [address] [place] [address]  [address] [place] [address]   [phone]            Feb 08, 2018  8:00 AM CST   Level 6 with UR CH 04   [facility], Infusion Services ([facility])    [address] [place] [address]  [address] [place] [address]   [phone]            Feb 27, 2018  6:45 AM CST   (Arrive by 6:30 AM)   PET ONCOLOGY WHOLE BODY with UUPET1   [facility] PET CT ([facility])    [address] [facility] [id number] [phone]           Tell your doctor:   If there is any chance you may be pregnant or if you are breastfeeding.   If you have problems lying in small spaces (claustrophobia). If you do, your doctor may give you medicine to help you relax. If you have diabetes:   Have your exam early in the  morning. Your blood glucose will go up as the day goes by.   Your glucose level must be 180 or less at the start of the exam. Please take any medicines you need to ensure this blood glucose level.   If you are taking insulin in the morning take with breakfast by 6 am and schedule procedure between 12 and 2:15 pm.   If you are taking insulin at night take nightly dose, fast overnight, schedule procedure before 10 am.   If you take insulin both morning and night take morning dose by 6am and schedule procedure between 12 and 2:15 pm.   24 hours before your scan: Don t do any heavy exercise. (No jogging, aerobics or other workouts.) Exercise will make your pictures less accurate.  7 hours before your scan: Eat a low carb, high protein meal (Lean meats, seafood, beans, soy, low-fat dairy, eggs, nuts & seeds). 6 hours before your scan:   Stop all food and liquids (except water).   Do not chew gum or suck on mints.   If you need to take medicine with food, you may take it with a few crackers.  Please call your Imaging Department at your exam site with any questions.            Feb 28, 2018  9:30 AM CST   (Arrive by 9:15 AM)   Return Visit with Edu Benitez MD   [facility] ([facility])    [address] [place] [address]  [address] [place] [address]   [phone]            Feb 28, 2018 10:15 AM CST   RETURN ONC with Garima Sauceda MD   [facility] Blood and Marrow Transplant ([facility])    [address] [place] [address]  [address] [place] [address]   [phone]            May 21, 2018  4:20 PM CDT   (Arrive by 4:05 PM)   RETURN ENDOCRINE with Avelina Castro MD   [facility] Endocrinology ([facility])    [address] [place] [address]  3rd Floor  [facility] [address]   [phone]            Jun 11, 2018  4:00 PM CDT   (Arrive by 3:45 PM)   Return Visit with Shahla Crawley MD   [facility] ([facility]  [facility])    [address] [facility]  [address]  [facility] [address] [phone]              Future tests that were ordered for you today     Open Standing Orders        Priority Remaining Interval Expires Ordered    Red blood cell prepare order unit Routine 99/100 CONDITIONAL (SPECIFY) BLOOD  1/31/2018    Platelets prepare order unit Routine 99/100 CONDITIONAL (SPECIFY) BLOOD  1/31/2018            Who to contact     If you have questions or need follow up information about today's clinic visit or your schedule please contact [facility] BLOOD AND MARROW TRANSPLANT directly at [phone].  Normal or non-critical lab and imaging results will be communicated to you by BiolineRxhart, letter or phone within 4 business days after the clinic has received the results. If you do not hear from us within 7 days, please contact the clinic through Tenebril or phone. If you have a critical or abnormal lab result, we will notify you by phone as soon as possible.  Submit refill requests through Tenebril or call your pharmacy and they will forward the refill request to us. Please allow 3 business days for your refill to be completed.          Additional Information About Your Visit        MyChart Information     Tenebril gives you secure access to your electronic health record. If you see a primary care provider, you can also send messages to your care team and make appointments. If you have questions, please call your primary care clinic.  If you do not have a primary care provider, please call [phone] and they will assist you.        Care EveryWhere ID     This is your Care EveryWhere ID. This could be used by other organizations to access your [facility] medical records  [id number]        Your Vitals Were     Pulse Temperature Respirations Pulse Oximetry          91 97.8  F (36.6  C) (Oral) 16 96%         Blood Pressure from Last 3 Encounters:   02/01/18 105/70   02/01/18 141/79   01/29/18 103/71    Weight  from Last 3 Encounters:   02/01/18 86.9 kg (191 lb 8 oz)   01/29/18 81.1 kg (178 lb 14.4 oz)   01/24/18 86.3 kg (190 lb 4.8 oz)              We Performed the Following     *CBC with platelets differential     Blood component     CBC with platelets differential     Platelets prepare order unit     Transfuse red blood cell unit     Transfuse red blood cell unit          Today's Medication Changes          These changes are accurate as of 2/1/18  4:34 PM.  If you have any questions, ask your nurse or doctor.               These medicines have changed or have updated prescriptions.        Dose/Directions    calcitRIOL 0.25 MCG capsule   Commonly known as:  ROCALTROL   This may have changed:    - how much to take  - how to take this  - when to take this  - additional instructions   Used for:  Postsurgical hypothyroidism, Papillary carcinoma, follicular variant (H), Metastasis to cervical lymph node (H)        TAKE 2 CAPSULES BY MOUTH EVERY MORNING AND TAKE 1 CAPSULE BY MOUTH EVERY NIGHT AT BEDTIME   Quantity:  90 capsule   Refills:  0       cyclobenzaprine 10 MG tablet   Commonly known as:  FLEXERIL   This may have changed:  when to take this   Used for:  High grade B-cell lymphoma (H)        Dose:  10 mg   Take 1 tablet (10 mg) by mouth 3 times daily as needed   Quantity:  30 tablet   Refills:  0       levothyroxine 112 MCG tablet   Commonly known as:  SYNTHROID/LEVOTHROID   This may have changed:    - how much to take  - additional instructions   Used for:  Postsurgical hypothyroidism, Papillary carcinoma, follicular variant (H), Postsurgical hypoparathyroidism (H), Thyroid cancer (H)        Mon-Sat: (2 tabs daily) Sunday: 3 tabs   Quantity:  189 tablet   Refills:  3       ondansetron 8 MG ODT tab   Commonly known as:  ZOFRAN-ODT   This may have changed:  when to take this   Used for:  High grade B-cell lymphoma (H), Nausea and vomiting, intractability of vomiting not specified, unspecified vomiting type        Dose:   8 mg   Take 1 tablet (8 mg) by mouth every 8 hours as needed   Quantity:  30 tablet   Refills:  1       oxyCODONE IR 30 MG tablet   Commonly known as:  ROXICODONE   This may have changed:  when to take this   Used for:  High grade B-cell lymphoma (H)        Dose:  30 mg   Take 1 tablet (30 mg) by mouth every 4 hours as needed for moderate to severe pain   Quantity:  30 tablet   Refills:  0                Recent Review Flowsheet Data     BMT Recent Results Latest Ref Rng & Units 1/21/2018 1/22/2018 1/22/2018 1/24/2018 1/29/2018 2/1/2018 2/1/2018    WBC 4.0 - 11.0 10e9/L 6.5 4.8 - 11.1(H) 0.1(LL) 5.7 5.9    Hemoglobin 11.7 - 15.7 g/dL 9.0(L) 8.8(L) - 9.4(L) 9.5(L) 7.8(L) 7.8(L)    Platelet Count 150 - 450 10e9/L 268 232 - 241 39(LL) 46(LL) 48(LL)    Neutrophils (Absolute) 1.6 - 8.3 10e9/L 5.8 4.3 - 10.9(H) - 4.9 4.7    INR 0.86 - 1.14 - - - - - - -    Sodium 133 - 144 mmol/L 144 142 - 139 133 - -    Potassium 3.4 - 5.3 mmol/L 3.8 3.2(L) 4.0 3.8 4.1 - -    Chloride 94 - 109 mmol/L 106 102 - 102 98 - -    Glucose 70 - 99 mg/dL 123(H) 119(H) - 124(H) 131(H) - -    Urea Nitrogen 7 - 30 mg/dL 22 24 - 23 21 - -    Creatinine 0.52 - 1.04 mg/dL 0.80 0.72 - 0.63 0.66 - -    Calcium (Total) 8.5 - 10.1 mg/dL 8.2(L) 7.9(L) - 7.7(L) 8.2(L) - -    Protein (Total) 6.8 - 8.8 g/dL - - - - - - -    Albumin 3.4 - 5.0 g/dL - - - - - - -    Alkaline Phosphatase 40 - 150 U/L - - - - - - -    AST 0 - 45 U/L - - - - - - -    ALT 0 - 50 U/L - - - - - - -    MCV 78 - 100 fl 103(H) 101(H) - 99 99 104(H) 101(H)               Primary Care Provider Office Phone # Fax #    Shahla Raul Crawley [phone]       [address] [place] [address]        Equal Access to Services     [facility]: Kevin Venegas, girish ge, ranjan south. [facility] [phone].    ATENCIÓN: Si habla español, tiene a stiles disposición servicios gratuitos de asistencia  lingüísticaJonatan Riggs al [phone].    We comply with applicable federal civil rights laws and [place] laws. We do not discriminate on the basis of race, color, national origin, age, disability, sex, sexual orientation, or gender identity.            Thank you!     Thank you for choosing [facility] BLOOD AND MARROW TRANSPLANT  for your care. Our goal is always to provide you with excellent care. Hearing back from our patients is one way we can continue to improve our services. Please take a few minutes to complete the written survey that you may receive in the mail after your visit with us. Thank you!             Your Updated Medication List - Protect others around you: Learn how to safely use, store and throw away your medicines at www.disposemymeds.org.          This list is accurate as of 2/1/18  4:34 PM.  Always use your most recent med list.                   Brand Name Dispense Instructions for use Diagnosis    acetaminophen 325 MG tablet    TYLENOL    100 tablet    Take 2 tablets (650 mg) by mouth every 4 hours as needed for mild pain or fever    High grade B-cell lymphoma (H)       acyclovir 400 MG tablet    ZOVIRAX    60 tablet    Take 1 tablet (400 mg) by mouth 2 times daily    High grade B-cell lymphoma (H)       aluminum chloride 20 % external solution    DRYSOL    60 mL    Apply topically At Bedtime    Rash, Intertrigo       AZMACORT IN      Inhale 2 puffs into the lungs as needed        bisacodyl 5 MG EC tablet     30 tablet    Take 3 tablets (15 mg) by mouth daily as needed for constipation    Constipation, unspecified constipation type       calcitRIOL 0.25 MCG capsule    ROCALTROL    90 capsule    TAKE 2 CAPSULES BY MOUTH EVERY MORNING AND TAKE 1 CAPSULE BY MOUTH EVERY NIGHT AT BEDTIME    Postsurgical hypothyroidism, Papillary carcinoma, follicular variant (H), Metastasis to cervical lymph node (H)       calcium citrate-vitamin D 315-250 MG-UNIT Tabs per tablet    CALCIUM CITRATE +D    20 tablet     Take 2 tablets by mouth twice a week    High grade B-cell lymphoma (H), Hypocalcemia       celecoxib 200 MG capsule    celeBREX    56 capsule    Take 1 capsule (200 mg) by mouth 2 times daily    Chest wall pain       cetirizine 10 MG tablet    ZYRTEC ALLERGY    90 tablet    Take 1 tablet (10 mg) by mouth 3 times daily On hold for lab test.    High grade B-cell lymphoma (H)       COMPOUND CONTAINING CONTROLLED SUBSTANCE - PHARMACY TO MIX COMPOUNDED MEDICATION    CMPD RX    120 g    Apply small amount to affected area two times daily.    Neoplasm related pain       cromolyn 4 % ophthalmic solution    OPTICROM    10 mL    Place 1 drop into both eyes 4 times daily    Idiopathic mast cell activation syndrome (H)       cromolyn sodium 5.2 MG/ACT Aers Inhaler    NASALCROM    1 Bottle    SPRAY ONE SPRAY( 1 ML) IN NOSTRIL DAILY    Mast cell disease, systemic       [employer] GUM[employer] Chew     120 tablet    Take 5 g by mouth daily (2 gummy= 5 g =1 serving)    High grade B-cell lymphoma (H)       cyclobenzaprine 10 MG tablet    FLEXERIL    30 tablet    Take 1 tablet (10 mg) by mouth 3 times daily as needed    High grade B-cell lymphoma (H)       dexamethasone 4 MG tablet    DECADRON    4 tablet    Take 2 tablets (8 mg) by mouth daily for 2 days Please take on 1/23 and 1/24.    High grade B-cell lymphoma (H)       diazepam 5 MG tablet    VALIUM     Take 1 tablet (5 mg) by mouth 3 times daily For muscle spasms    Chest wall pain       diclofenac 1 % Gel topical gel    VOLTAREN    100 g    Apply affected area two times daily PRN using enclosed dosing card.    Myofascial pain       EPINEPHrine 0.3 MG/0.3ML injection 2-pack    EPIPEN 2-CARIDAD    0.6 mL    Inject 0.3 mLs (0.3 mg) into the muscle once as needed for anaphylaxis        fluconazole 200 MG tablet    DIFLUCAN    30 tablet    Take 1 tablet (200 mg) by mouth daily    High grade B-cell lymphoma (H)       furosemide 20 MG tablet    LASIX    60 tablet    Take 1 tablet  (20 mg) by mouth 2 times daily    Localized edema       hydrochlorothiazide 12.5 MG capsule    MICROZIDE    30 capsule    Take 1 capsule (12.5 mg) by mouth daily    Hypocalcemia       levofloxacin 250 MG tablet    LEVAQUIN    30 tablet    Take 1 tablet (250 mg) by mouth daily    High grade B-cell lymphoma (H)       levothyroxine 112 MCG tablet    SYNTHROID/LEVOTHROID    189 tablet    Mon-Sat: (2 tabs daily) Sunday: 3 tabs    Postsurgical hypothyroidism, Papillary carcinoma, follicular variant (H), Postsurgical hypoparathyroidism (H), Thyroid cancer (H)       lidocaine 5 % ointment    XYLOCAINE    350 g    Apply quarter size amount to chest and back up to 3 times daily as needed for pain.    Chest wall pain       lidocaine visc 2% 2.5mL/5mL & maalox/mylanta w/ simeth 2.5mL/5mL & diphenhydrAMINE 5mg/5mL Susp suspension    [facility]    360 mL    Swish and spit 10 mLs in mouth every 6 hours as needed for mouth sores    Stomatitis and mucositis, High grade B-cell lymphoma (H)       lidocaine-prilocaine cream    EMLA    30 g    Apply topically as needed (port access pain.)    Neoplasm related pain, Chest wall pain       methocarbamol 750 MG tablet    ROBAXIN     Take 1 tablet (750 mg) by mouth 4 times daily . Ok to take a 5th Robaxin on very severe days.    Myofascial pain       montelukast 10 MG tablet    SINGULAIR    60 tablet    Take 2 tablets (20 mg) by mouth 2 times daily    Idiopathic mast cell activation syndrome (H)       morphine 30 MG 12 hr tablet    MS CONTIN    120 tablet    Take 1 tablet (30 mg) by mouth every 8 hours . Take an addition pill at night such that your evening dose is 60mg    Neoplasm related pain       ondansetron 8 MG ODT tab    ZOFRAN-ODT    30 tablet    Take 1 tablet (8 mg) by mouth every 8 hours as needed    High grade B-cell lymphoma (H), Nausea and vomiting, intractability of vomiting not specified, unspecified vomiting type       * order for DME     2 Units    Equipment  being ordered: compression stockings. 20-30 mm or 30 - 40 mm as patient can tolerate. Physical therapy to determine if they should be above or below the knee.    Venous stasis       * order for DME     2 Device    Equipment being ordered: compression bra    Malignant neoplasm of right female breast, unspecified site of breast       * order for DME     1 Units    Compression stockings, medium grade, please measure and fit. Please dispense a pair.    Peripheral edema       oxyCODONE IR 30 MG tablet    ROXICODONE    30 tablet    Take 1 tablet (30 mg) by mouth every 4 hours as needed for moderate to severe pain    High grade B-cell lymphoma (H)       polyethylene glycol powder    MIRALAX    510 g    Take 17 g (1 capful) by mouth daily    Acute constipation       potassium chloride SA 20 MEQ CR tablet    KLOR-CON    30 tablet    Take 1 tablet (20 mEq) by mouth 2 times daily    Hypokalemia       PROBIOTIC DAILY PO      Take 1 capsule by mouth daily Lacto acid bifidobacterium    Breast cancer, unspecified laterality, Thyroid cancer (H), Chronic arthralgias of knees and hips, unspecified laterality       prochlorperazine 10 MG tablet    COMPAZINE     Take 10 mg by mouth as needed        ranitidine 75 MG tablet    ZANTAC    90 tablet    Take 1 tablet (75 mg) by mouth 3 times daily    Mast cell disease, systemic       senna-docusate 8.6-50 MG per tablet    SENOKOT-S;PERICOLACE    60 tablet    Take 1-2 tablets by mouth 2 times daily as needed for constipation    Acute constipation       SUMAtriptan 50 MG tablet    IMITREX     Take 50 mg by mouth as needed        triamcinolone 0.025 % ointment    KENALOG     Apply topically as needed        * UNABLE TO FIND      3 tablets 3 times daily MEDICATION NAME: calcium D-Glucarate  3 caps contain 180mg of elemental calcium.        * UNABLE TO FIND      Take 2 capsules by mouth 3 times daily Muscle Mag. 2 caps contain B1 20mg, B2 20mg, B6 10mg, magesium 20mg, manganese 2mg.        *  UNABLE TO FIND      2 tablets 3 times daily MEDICATION NAME: Digestzymes    Thyroid cancer (H), Postsurgical hypothyroidism, Postsurgical hypoparathyroidism (H)       * UNABLE TO FIND      1 tablet daily MEDICATION NAME: Pure Encapsulations    Thyroid cancer (H), Postsurgical hypothyroidism, Postsurgical hypoparathyroidism (H)       VENTOLIN  (90 BASE) MCG/ACT Inhaler   Generic drug:  albuterol     18 g    INHALE 2 PUFFS INTO THE LUNGS EVERY 4 HOURS AS NEEDED FOR SHORTNESS OF BREATH OR DIFFICULT BREATHING OR WHEEZING    Mild intermittent asthma without complication       vitamin D3 2000 UNITS Caps     60 capsule    Take 5,000 Units by mouth daily Takes 2 tabs daily    Thyroid cancer (H), Postsurgical hypothyroidism, Papillary carcinoma, follicular variant (H), Postsurgical hypoparathyroidism (H)       * Notice:  This list has 7 medication(s) that are the same as other medications prescribed for you. Read the directions carefully, and ask your doctor or other care provider to review them with you.

## 2018-02-01 NOTE — PROGRESS NOTES
Infusion Nursing Note:  Tisha Arias presents today for infusion.    Patient seen by provider today: Yes: Selina Elizondo   present during visit today: Not Applicable.    Note: Pt received 2 units of pRBCs at this visit.  No premedications required.      Intravenous Access:  Implanted Port.    Treatment Conditions:  Lab Results   Component Value Date    HGB 7.8 02/01/2018     Lab Results   Component Value Date    WBC 5.9 02/01/2018      Lab Results   Component Value Date    ANEU 4.7 02/01/2018     Lab Results   Component Value Date    PLT 48 02/01/2018      Results reviewed, labs MET treatment parameters, ok to proceed with treatment.      Post Infusion Assessment:  No evidence of extravasations.  Access discontinued per protocol.    Discharge Plan:   Patient discharged in stable condition accompanied by: self.  Departure Mode: Ambulatory.    Marley Cabrera RN

## 2018-02-01 NOTE — MR AVS SNAPSHOT
After Visit Summary   2/1/2018    Tisha Arias    MRN: [id number]           Patient Information     Date Of Birth          1960        Visit Information        Provider Department      2/1/2018 10:30 AM Selina Elizondo PA [facility]        Today's Diagnoses     Diffuse large B-cell lymphoma, unspecified body region (H)    -  1       Follow-ups after your visit        Your next 10 appointments already scheduled     Feb 05, 2018  8:00 AM CST   Level 6 with UR CH 05   [facility], Infusion Services ([facility])    [address] [place] [address] S.  [address] [place] [address]   [phone]            Feb 08, 2018  8:00 AM CST   Level 6 with UR CH 04   [facility] [place], Infusion Services ([facility])    [address] [place] [address] S.  [address] [place] [address]   [phone]            Feb 27, 2018  6:45 AM CST   (Arrive by 6:30 AM)   PET ONCOLOGY WHOLE BODY with UUPET1   [facility] PET CT ([facility])    [address] [facility] [id number] [phone]           Tell your doctor:   If there is any chance you may be pregnant or if you are breastfeeding.   If you have problems lying in small spaces (claustrophobia). If you do, your doctor may give you medicine to help you relax. If you have diabetes:   Have your exam early in the morning. Your blood glucose will go up as the day goes by.   Your glucose level must be 180 or less at the start of the exam. Please take any medicines you need to ensure this blood glucose level.   If you are taking insulin in the morning take with breakfast by 6 am and schedule procedure between 12 and 2:15 pm.   If you are taking insulin at night take nightly dose, fast overnight, schedule procedure before 10 am.   If you take insulin both morning and night take morning dose by 6am and schedule procedure  between 12 and 2:15 pm.   24 hours before your scan: Don t do any heavy exercise. (No jogging, aerobics or other workouts.) Exercise will make your pictures less accurate.  7 hours before your scan: Eat a low carb, high protein meal (Lean meats, seafood, beans, soy, low-fat dairy, eggs, nuts & seeds). 6 hours before your scan:   Stop all food and liquids (except water).   Do not chew gum or suck on mints.   If you need to take medicine with food, you may take it with a few crackers.  Please call your Imaging Department at your exam site with any questions.            Feb 28, 2018  9:30 AM CST   (Arrive by 9:15 AM)   Return Visit with Edu Benitez MD   [facility] ([facility])    [address] [place] [address]  [address]  [facility] [address]   [phone]            Feb 28, 2018 10:15 AM CST   RETURN ONC with Garima Sauceda MD   [facility] Blood and Marrow Transplant ([facility])    [address] [place] [address]  [address]  [facility] [address]   [phone]            May 21, 2018  4:20 PM CDT   (Arrive by 4:05 PM)   RETURN ENDOCRINE with Avelina Castro MD   [facility] Endocrinology ([facility])    [address] [place] [address]  3rd Floor  [facility] [address]   [phone]            Jun 11, 2018  4:00 PM CDT   (Arrive by 3:45 PM)   Return Visit with Shahla Crawley MD   [facility] ([facility])    [address] [place] [address]  [address]  [facility] [address]   [phone]              Future tests that were ordered for you today     Open Standing Orders        Priority Remaining Interval Expires Ordered    Red blood cell prepare order unit Routine 99/100 CONDITIONAL (SPECIFY) BLOOD  1/31/2018    Platelets prepare order unit Routine 99/100 CONDITIONAL (SPECIFY) BLOOD  1/31/2018            Who to contact     If you have questions or need follow up information about today's  "clinic visit or your schedule please contact [facility] directly at [phone].  Normal or non-critical lab and imaging results will be communicated to you by MyChart, letter or phone within 4 business days after the clinic has received the results. If you do not hear from us within 7 days, please contact the clinic through [employer] or phone. If you have a critical or abnormal lab result, we will notify you by phone as soon as possible.  Submit refill requests through [employer] or call your pharmacy and they will forward the refill request to us. Please allow 3 business days for your refill to be completed.          Additional Information About Your Visit        [employer] Information     [employer] gives you secure access to your electronic health record. If you see a primary care provider, you can also send messages to your care team and make appointments. If you have questions, please call your primary care clinic.  If you do not have a primary care provider, please call [phone] and they will assist you.        Care EveryWhere ID     This is your Care EveryWhere ID. This could be used by other organizations to access your [facility] medical records  [id number]        Your Vitals Were     Pulse Temperature Respirations Height Pulse Oximetry BMI (Body Mass Index)    123 97.8  F (36.6  C) (Oral) 16 1.651 m (5' 5\") 100% 31.87 kg/m2       Blood Pressure from Last 3 Encounters:   02/01/18 105/70   02/01/18 141/79   01/29/18 103/71    Weight from Last 3 Encounters:   02/01/18 86.9 kg (191 lb 8 oz)   01/29/18 81.1 kg (178 lb 14.4 oz)   01/24/18 86.3 kg (190 lb 4.8 oz)                 Today's Medication Changes          These changes are accurate as of 2/1/18  3:12 PM.  If you have any questions, ask your nurse or doctor.               These medicines have changed or have updated prescriptions.        Dose/Directions    calcitRIOL 0.25 MCG capsule   Commonly known as:  ROCALTROL   This may have changed:  "   - how much to take  - how to take this  - when to take this  - additional instructions   Used for:  Postsurgical hypothyroidism, Papillary carcinoma, follicular variant (H), Metastasis to cervical lymph node (H)        TAKE 2 CAPSULES BY MOUTH EVERY MORNING AND TAKE 1 CAPSULE BY MOUTH EVERY NIGHT AT BEDTIME   Quantity:  90 capsule   Refills:  0       cyclobenzaprine 10 MG tablet   Commonly known as:  FLEXERIL   This may have changed:  when to take this   Used for:  High grade B-cell lymphoma (H)        Dose:  10 mg   Take 1 tablet (10 mg) by mouth 3 times daily as needed   Quantity:  30 tablet   Refills:  0       levothyroxine 112 MCG tablet   Commonly known as:  SYNTHROID/LEVOTHROID   This may have changed:    - how much to take  - additional instructions   Used for:  Postsurgical hypothyroidism, Papillary carcinoma, follicular variant (H), Postsurgical hypoparathyroidism (H), Thyroid cancer (H)        Mon-Sat: (2 tabs daily) Sunday: 3 tabs   Quantity:  189 tablet   Refills:  3       ondansetron 8 MG ODT tab   Commonly known as:  ZOFRAN-ODT   This may have changed:  when to take this   Used for:  High grade B-cell lymphoma (H), Nausea and vomiting, intractability of vomiting not specified, unspecified vomiting type        Dose:  8 mg   Take 1 tablet (8 mg) by mouth every 8 hours as needed   Quantity:  30 tablet   Refills:  1       oxyCODONE IR 30 MG tablet   Commonly known as:  ROXICODONE   This may have changed:  when to take this   Used for:  High grade B-cell lymphoma (H)        Dose:  30 mg   Take 1 tablet (30 mg) by mouth every 4 hours as needed for moderate to severe pain   Quantity:  30 tablet   Refills:  0                Primary Care Provider Office Phone # Fax #    Shahla Crawley [phone]       [address] [address]        Equal Access to Services     RODRIGO ZAMORA AH: girish White qaybta kaalmada adeegyada, waxay idiin  gal avelarsukhjinder lajustinoumm ah. So [facility] [phone].    ATENCIÓN: Si gladysla veda, tiene a stiles disposición servicios gratuitos de asistencia lingüística. Oneil prince [phone].    We comply with applicable federal civil rights laws and [place] laws. We do not discriminate on the basis of race, color, national origin, age, disability, sex, sexual orientation, or gender identity.            Thank you!     Thank you for choosing [facility]  for your care. Our goal is always to provide you with excellent care. Hearing back from our patients is one way we can continue to improve our services. Please take a few minutes to complete the written survey that you may receive in the mail after your visit with us. Thank you!             Your Updated Medication List - Protect others around you: Learn how to safely use, store and throw away your medicines at www.disposemymeds.org.          This list is accurate as of 2/1/18  3:12 PM.  Always use your most recent med list.                   Brand Name Dispense Instructions for use Diagnosis    acetaminophen 325 MG tablet    TYLENOL    100 tablet    Take 2 tablets (650 mg) by mouth every 4 hours as needed for mild pain or fever    High grade B-cell lymphoma (H)       acyclovir 400 MG tablet    ZOVIRAX    60 tablet    Take 1 tablet (400 mg) by mouth 2 times daily    High grade B-cell lymphoma (H)       aluminum chloride 20 % external solution    DRYSOL    60 mL    Apply topically At Bedtime    Rash, Intertrigo       AZMACORT IN      Inhale 2 puffs into the lungs as needed        bisacodyl 5 MG EC tablet     30 tablet    Take 3 tablets (15 mg) by mouth daily as needed for constipation    Constipation, unspecified constipation type       calcitRIOL 0.25 MCG capsule    ROCALTROL    90 capsule    TAKE 2 CAPSULES BY MOUTH EVERY MORNING AND TAKE 1 CAPSULE BY MOUTH EVERY NIGHT AT BEDTIME    Postsurgical hypothyroidism, Papillary carcinoma, follicular variant (H),  Metastasis to cervical lymph node (H)       calcium citrate-vitamin D 315-250 MG-UNIT Tabs per tablet    CALCIUM CITRATE +D    20 tablet    Take 2 tablets by mouth twice a week    High grade B-cell lymphoma (H), Hypocalcemia       celecoxib 200 MG capsule    celeBREX    56 capsule    Take 1 capsule (200 mg) by mouth 2 times daily    Chest wall pain       cetirizine 10 MG tablet    ZYRTEC ALLERGY    90 tablet    Take 1 tablet (10 mg) by mouth 3 times daily On hold for lab test.    High grade B-cell lymphoma (H)       COMPOUND CONTAINING CONTROLLED SUBSTANCE - PHARMACY TO MIX COMPOUNDED MEDICATION    CMPD RX    120 g    Apply small amount to affected area two times daily.    Neoplasm related pain       cromolyn 4 % ophthalmic solution    OPTICROM    10 mL    Place 1 drop into both eyes 4 times daily    Idiopathic mast cell activation syndrome (H)       cromolyn sodium 5.2 MG/ACT Aers Inhaler    NASALCROM    1 Bottle    SPRAY ONE SPRAY( 1 ML) IN NOSTRIL DAILY    Mast cell disease, systemic       [employer] GUMMY BE[employer] CHILDREN Chew     120 tablet    Take 5 g by mouth daily (2 gummy= 5 g =1 serving)    High grade B-cell lymphoma (H)       cyclobenzaprine 10 MG tablet    FLEXERIL    30 tablet    Take 1 tablet (10 mg) by mouth 3 times daily as needed    High grade B-cell lymphoma (H)       dexamethasone 4 MG tablet    DECADRON    4 tablet    Take 2 tablets (8 mg) by mouth daily for 2 days Please take on 1/23 and 1/24.    High grade B-cell lymphoma (H)       diazepam 5 MG tablet    VALIUM     Take 1 tablet (5 mg) by mouth 3 times daily For muscle spasms    Chest wall pain       diclofenac 1 % Gel topical gel    VOLTAREN    100 g    Apply affected area two times daily PRN using enclosed dosing card.    Myofascial pain       EPINEPHrine 0.3 MG/0.3ML injection 2-pack    EPIPEN 2-CARIDAD    0.6 mL    Inject 0.3 mLs (0.3 mg) into the muscle once as needed for anaphylaxis        fluconazole 200 MG tablet    DIFLUCAN    30 tablet     Take 1 tablet (200 mg) by mouth daily    High grade B-cell lymphoma (H)       furosemide 20 MG tablet    LASIX    60 tablet    Take 1 tablet (20 mg) by mouth 2 times daily    Localized edema       hydrochlorothiazide 12.5 MG capsule    MICROZIDE    30 capsule    Take 1 capsule (12.5 mg) by mouth daily    Hypocalcemia       levofloxacin 250 MG tablet    LEVAQUIN    30 tablet    Take 1 tablet (250 mg) by mouth daily    High grade B-cell lymphoma (H)       levothyroxine 112 MCG tablet    SYNTHROID/LEVOTHROID    189 tablet    Mon-Sat: (2 tabs daily) Sunday: 3 tabs    Postsurgical hypothyroidism, Papillary carcinoma, follicular variant (H), Postsurgical hypoparathyroidism (H), Thyroid cancer (H)       lidocaine 5 % ointment    XYLOCAINE    350 g    Apply quarter size amount to chest and back up to 3 times daily as needed for pain.    Chest wall pain       lidocaine visc 2% 2.5mL/5mL & maalox/mylanta w/ simeth 2.5mL/5mL & diphenhydrAMINE 5mg/5mL Susp suspension    [facility]    360 mL    Swish and spit 10 mLs in mouth every 6 hours as needed for mouth sores    Stomatitis and mucositis, High grade B-cell lymphoma (H)       lidocaine-prilocaine cream    EMLA    30 g    Apply topically as needed (port access pain.)    Neoplasm related pain, Chest wall pain       methocarbamol 750 MG tablet    ROBAXIN     Take 1 tablet (750 mg) by mouth 4 times daily . Ok to take a 5th Robaxin on very severe days.    Myofascial pain       montelukast 10 MG tablet    SINGULAIR    60 tablet    Take 2 tablets (20 mg) by mouth 2 times daily    Idiopathic mast cell activation syndrome (H)       morphine 30 MG 12 hr tablet    MS CONTIN    120 tablet    Take 1 tablet (30 mg) by mouth every 8 hours . Take an addition pill at night such that your evening dose is 60mg    Neoplasm related pain       ondansetron 8 MG ODT tab    ZOFRAN-ODT    30 tablet    Take 1 tablet (8 mg) by mouth every 8 hours as needed    High grade B-cell lymphoma  (H), Nausea and vomiting, intractability of vomiting not specified, unspecified vomiting type       * order for DME     2 Units    Equipment being ordered: compression stockings. 20-30 mm or 30 - 40 mm as patient can tolerate. Physical therapy to determine if they should be above or below the knee.    Venous stasis       * order for DME     2 Device    Equipment being ordered: compression bra    Malignant neoplasm of right female breast, unspecified site of breast       * order for DME     1 Units    Compression stockings, medium grade, please measure and fit. Please dispense a pair.    Peripheral edema       oxyCODONE IR 30 MG tablet    ROXICODONE    30 tablet    Take 1 tablet (30 mg) by mouth every 4 hours as needed for moderate to severe pain    High grade B-cell lymphoma (H)       polyethylene glycol powder    MIRALAX    510 g    Take 17 g (1 capful) by mouth daily    Acute constipation       potassium chloride SA 20 MEQ CR tablet    KLOR-CON    30 tablet    Take 1 tablet (20 mEq) by mouth 2 times daily    Hypokalemia       PROBIOTIC DAILY PO      Take 1 capsule by mouth daily Lacto acid bifidobacterium    Breast cancer, unspecified laterality, Thyroid cancer (H), Chronic arthralgias of knees and hips, unspecified laterality       prochlorperazine 10 MG tablet    COMPAZINE     Take 10 mg by mouth as needed        ranitidine 75 MG tablet    ZANTAC    90 tablet    Take 1 tablet (75 mg) by mouth 3 times daily    Mast cell disease, systemic       senna-docusate 8.6-50 MG per tablet    SENOKOT-S;PERICOLACE    60 tablet    Take 1-2 tablets by mouth 2 times daily as needed for constipation    Acute constipation       SUMAtriptan 50 MG tablet    IMITREX     Take 50 mg by mouth as needed        triamcinolone 0.025 % ointment    KENALOG     Apply topically as needed        * UNABLE TO FIND      3 tablets 3 times daily MEDICATION NAME: calcium D-Glucarate  3 caps contain 180mg of elemental calcium.        * UNABLE TO FIND       Take 2 capsules by mouth 3 times daily Muscle Mag. 2 caps contain B1 20mg, B2 20mg, B6 10mg, magesium 20mg, manganese 2mg.        * UNABLE TO FIND      2 tablets 3 times daily MEDICATION NAME: Digestzymes    Thyroid cancer (H), Postsurgical hypothyroidism, Postsurgical hypoparathyroidism (H)       * UNABLE TO FIND      1 tablet daily MEDICATION NAME: Pure Encapsulations    Thyroid cancer (H), Postsurgical hypothyroidism, Postsurgical hypoparathyroidism (H)       VENTOLIN  (90 BASE) MCG/ACT Inhaler   Generic drug:  albuterol     18 g    INHALE 2 PUFFS INTO THE LUNGS EVERY 4 HOURS AS NEEDED FOR SHORTNESS OF BREATH OR DIFFICULT BREATHING OR WHEEZING    Mild intermittent asthma without complication       vitamin D3 2000 UNITS Caps     60 capsule    Take 5,000 Units by mouth daily Takes 2 tabs daily    Thyroid cancer (H), Postsurgical hypothyroidism, Papillary carcinoma, follicular variant (H), Postsurgical hypoparathyroidism (H)       * Notice:  This list has 7 medication(s) that are the same as other medications prescribed for you. Read the directions carefully, and ask your doctor or other care provider to review them with you.

## 2018-02-01 NOTE — NURSING NOTE
Received critical result from lab, abnormal cells noted on CBC and sample will be sent to pathology for further review.  Dewayne notified.

## 2018-02-01 NOTE — LETTER
"2/1/2018      RE: Tisha Arias  [address]  [place] [address]       Oncology/Hematology Visit Note  Feb 1, 2018    Reason for Visit: follow up of DLBCL s/p C6 DA R-EPOCH. 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 She is currently undergoing treatment with DA-R-EPOCH. Her past medical history is significant for breast cancer in 2011 s/p bilateral mastectomies and BENJIE/BSO up until recently on Tamoxifen, papillary thyroid cancer s/p total thyroidectomy, chronic chest wall pain, and asthma. Briefly, her oncologic history is as follows:     She presented in 8/2017 with dramatically worse chest and back pain. CT 9/1/17 showed lytic lesion right 10th rib extending to right T9-10 neural foramen with vertebral body invasion. There was associated conglomerate of lymphadenopathy around the mid T-spine. She had a CT guided biopsy showing B-cell high grade lymphoma. Pathology showed \"The morphology shows a population of large cells, diffuse lymphoid infiltrate. The cells are atypical with abundant mitotic and apoptotic activity and single cell necrosis. Tumor is CD45 and CD79a positive and focally weakly CD30 positive. The other stains revealed positivity for CD20, BCL6, BCL2 and MUM1, and CD10 and CD21 negative. The CD5 and CD3 highlighted background T-cells.  MYC expression was equivocal with approximately 40% nuclei staining.  Ki-67 proliferative index is greater than 90-95%. The immunohistochemistry is suggestive of non-GCB immunophenotype of diffuse large B-cell lymphoma. The cytogenetics did not show rearrangement of the BCL2 or c-MYC. There is the presence of BCL6 rearrangement in 78% of cells and gains of MYC and BCL2, but no amplifications.\"      Staging LP and BMBx were negative for lymphoma. She started DA-REPOCH 10/6/17. She did well with chemo other than rigors during CIVI. D/c " 10/11/17. Given Neulasta 10/12/17. Post cycle 1 complicated by mucositis and worsening of chronic LE peripheral edema. She began cycle 2 on 10/27/17. Due to a rise in her LD and uric acid levels on that admission day, she underwent a CT scan which showed response to therapy with improvement in her mediastinal lymphadenopathy and right chest wall mass. She had a CR following 4 cycles on PET/CT 12/21.     Interval History:  Elinor has been holding her lasix since I saw her on 1/29 and tells me she has had no further falls. She did get lightheaded yesterday evening when she went home and also today when getting out of the car and she felt the cold outside air hit her. She has had no further episodes of vertigo. She does feel as though her legs feel weak overall with walking. The edema in her legs is a little worse after stopping lasix, but better overall than on it has been in past. She had an episode of back pain today which felt like a spasm up and down her entire spine that was transient. She currently denies any pain. She still has mild nausea, but no vomiting. She takes Zofran when needed. Continues to have some intermittent constipation but using miralax as needed. She does not have much appetite but is making effort to eat. She has area of soreness on inner lower lip but has been using biotene and salt/soda rinses.    Current Outpatient Prescriptions   Medication Sig Dispense Refill     cromolyn sodium (NASALCROM) 5.2 MG/ACT AERS Inhaler SPRAY ONE SPRAY( 1 ML) IN NOSTRIL DAILY 1 Bottle 6     lidocaine-prilocaine (EMLA) cream Apply topically as needed (port access pain.) 30 g 1     diazepam (VALIUM) 5 MG tablet Take 1 tablet (5 mg) by mouth 3 times daily For muscle spasms       [employer] FIBER GUMMY BEARS CHILDREN CHEW Take 5 g by mouth daily (2 gummy= 5 g =1 serving) 120 tablet 3     methocarbamol (ROBAXIN) 750 MG tablet Take 1 tablet (750 mg) by mouth 4 times daily . Ok to take a 5th Robaxin on very severe days.        diclofenac (VOLTAREN) 1 % GEL topical gel Apply affected area two times daily PRN using enclosed dosing card. 100 g 0     oxyCODONE IR (ROXICODONE) 30 MG tablet Take 1 tablet (30 mg) by mouth every 4 hours as needed for moderate to severe pain (Patient taking differently: Take 30 mg by mouth every 4 hours ) 30 tablet 0     celecoxib (CELEBREX) 200 MG capsule Take 1 capsule (200 mg) by mouth 2 times daily 56 capsule 0     ondansetron (ZOFRAN-ODT) 8 MG ODT tab Take 1 tablet (8 mg) by mouth every 8 hours as needed (Patient taking differently: Take 8 mg by mouth every 8 hours ) 30 tablet 1     fluconazole (DIFLUCAN) 200 MG tablet Take 1 tablet (200 mg) by mouth daily 30 tablet 0     cetirizine (ZYRTEC ALLERGY) 10 MG tablet Take 1 tablet (10 mg) by mouth 3 times daily On hold for lab test. 90 tablet 0     acyclovir (ZOVIRAX) 400 MG tablet Take 1 tablet (400 mg) by mouth 2 times daily 60 tablet 0     hydrochlorothiazide (MICROZIDE) 12.5 MG capsule Take 1 capsule (12.5 mg) by mouth daily 30 capsule 0     levofloxacin (LEVAQUIN) 250 MG tablet Take 1 tablet (250 mg) by mouth daily 30 tablet 0     calcium citrate-vitamin D (CALCIUM CITRATE +D) 315-250 MG-UNIT TABS per tablet Take 2 tablets by mouth twice a week 20 tablet 0     cyclobenzaprine (FLEXERIL) 10 MG tablet Take 1 tablet (10 mg) by mouth 3 times daily as needed (Patient taking differently: Take 10 mg by mouth 3 times daily ) 30 tablet 0     ranitidine (ZANTAC) 75 MG tablet Take 1 tablet (75 mg) by mouth 3 times daily 90 tablet 0     Cholecalciferol (VITAMIN D3) 2000 UNITS CAPS Take 5,000 Units by mouth daily Takes 2 tabs daily 60 capsule 0     calcitRIOL (ROCALTROL) 0.25 MCG capsule TAKE 2 CAPSULES BY MOUTH EVERY MORNING AND TAKE 1 CAPSULE BY MOUTH EVERY NIGHT AT BEDTIME (Patient taking differently: Take 0.25 mcg by mouth 2 times daily TAKE 2 CAPSULES BY MOUTH EVERY MORNING AND TAKE 1 CAPSULE BY MOUTH EVERY NIGHT AT NOON.) 90 capsule 0     cromolyn (OPTICROM) 4  % ophthalmic solution Place 1 drop into both eyes 4 times daily 10 mL 0     montelukast (SINGULAIR) 10 MG tablet Take 2 tablets (20 mg) by mouth 2 times daily 60 tablet 0     potassium chloride SA (KLOR-CON) 20 MEQ CR tablet Take 1 tablet (20 mEq) by mouth 2 times daily 30 tablet 0     morphine (MS CONTIN) 30 MG 12 hr tablet Take 1 tablet (30 mg) by mouth every 8 hours . Take an addition pill at night such that your evening dose is 60mg 120 tablet 0     magic mouthwash suspension (diphenhydrAMINE, lidocaine, aluminum-magnesium & simethicone) Swish and spit 10 mLs in mouth every 6 hours as needed for mouth sores 360 mL 1     prochlorperazine (COMPAZINE) 10 MG tablet Take 10 mg by mouth as needed  3     SUMAtriptan (IMITREX) 50 MG tablet Take 50 mg by mouth as needed  3     acetaminophen (TYLENOL) 325 MG tablet Take 2 tablets (650 mg) by mouth every 4 hours as needed for mild pain or fever 100 tablet      triamcinolone (KENALOG) 0.025 % ointment Apply topically as needed  3     lidocaine (XYLOCAINE) 5 % ointment Apply quarter size amount to chest and back up to 3 times daily as needed for pain. 350 g 1     bisacodyl (DULCOLAX) 5 MG EC tablet Take 3 tablets (15 mg) by mouth daily as needed for constipation 30 tablet 0     polyethylene glycol (MIRALAX) powder Take 17 g (1 capful) by mouth daily 510 g 0     senna-docusate (SENOKOT-S;PERICOLACE) 8.6-50 MG per tablet Take 1-2 tablets by mouth 2 times daily as needed for constipation 60 tablet 0     UNABLE TO FIND Take 2 capsules by mouth 3 times daily Muscle Mag. 2 caps contain B1 20mg, B2 20mg, B6 10mg, magesium 20mg, manganese 2mg.       order for DME Compression stockings, medium grade, please measure and fit. Please dispense a pair. 1 Units 0     levothyroxine (SYNTHROID/LEVOTHROID) 112 MCG tablet Mon-Sat: (2 tabs daily) Sunday: 3 tabs (Patient taking differently: 112 mcg Mon-Sat: (2 tabs daily) Sunday: 3 tabs) 189 tablet 3     VENTOLIN  (90 BASE) MCG/ACT  "Inhaler INHALE 2 PUFFS INTO THE LUNGS EVERY 4 HOURS AS NEEDED FOR SHORTNESS OF BREATH OR DIFFICULT BREATHING OR WHEEZING 18 g 3     order for DME Equipment being ordered: compression stockings. 20-30 mm or 30 - 40 mm as patient can tolerate. Physical therapy to determine if they should be above or below the knee. 2 Units 4     order for DME Equipment being ordered: compression bra 2 Device 1     aluminum chloride (DRYSOL) 20 % external solution Apply topically At Bedtime 60 mL 3     UNABLE TO FIND 3 tablets 3 times daily MEDICATION NAME: calcium D-Glucarate   3 caps contain 180mg of elemental calcium.       Triamcinolone Acetonide (AZMACORT IN) Inhale 2 puffs into the lungs as needed       UNABLE TO FIND 2 tablets 3 times daily MEDICATION NAME: Digestzymes        UNABLE TO FIND 1 tablet daily MEDICATION NAME: Pure Encapsulations       Probiotic Product (PROBIOTIC DAILY PO) Take 1 capsule by mouth daily Lacto acid bifidobacterium       dexamethasone (DECADRON) 4 MG tablet Take 2 tablets (8 mg) by mouth daily for 2 days Please take on 1/23 and 1/24. 4 tablet 0     furosemide (LASIX) 20 MG tablet Take 1 tablet (20 mg) by mouth 2 times daily (Patient not taking: Reported on 2/1/2018) 60 tablet 0     COMPOUND CONTAINING CONTROLLED SUBSTANCE (CMPD RX) - PHARMACY TO MIX COMPOUNDED MEDICATION Apply small amount to affected area two times daily. (Patient not taking: Reported on 1/24/2018) 120 g 6     EPINEPHrine (EPIPEN 2-CARIDAD) 0.3 MG/0.3ML injection Inject 0.3 mLs (0.3 mg) into the muscle once as needed for anaphylaxis (Patient not taking: Reported on 1/24/2018) 0.6 mL 3       Physical Examination:  General: The patient is a pleasant female in no acute distress.  /79 (BP Location: Left arm, Patient Position: Sitting, Cuff Size: Adult Regular)  Pulse 123  Temp 97.8  F (36.6  C) (Oral)  Resp 16  Ht 1.651 m (5' 5\")  Wt 86.9 kg (191 lb 8 oz)  SpO2 100%  BMI 31.87 kg/m2  Wt Readings from Last 10 Encounters: "   02/01/18 86.9 kg (191 lb 8 oz)   01/29/18 81.1 kg (178 lb 14.4 oz)   01/24/18 86.3 kg (190 lb 4.8 oz)   01/22/18 87.2 kg (192 lb 3.9 oz)   01/18/18 88.2 kg (194 lb 8 oz)   01/15/18 86.1 kg (189 lb 14.4 oz)   01/09/18 82.3 kg (181 lb 8 oz)   01/03/18 85.7 kg (189 lb)   01/01/18 87.5 kg (193 lb)   12/26/17 85.5 kg (188 lb 7.9 oz)     HEENT: EOMI, PERRL. Sclerae are anicteric. Oral mucosa is pink and moist. One small ulcer on mucosal surface of lower lip. No thrush.   Lymph: Neck is supple with no lymphadenopathy in the cervical or supraclavicular areas.   Heart: Regular rate and rhythm.   Lungs: Clear to auscultation bilaterally.   Abdomen: Bowel sounds present, soft, nontender with no palpable hepatosplenomegaly or masses.   Extremities: 1-2+ pitting edema to distal 1/3 of lower extremities.  Neuro: Cranial nerves II through XII are grossly intact. Grossly non-focal  Skin: Abrasion on upper lip, left lateral edge of hand. No rashes, petechiae, or bruising noted on exposed skin.    Laboratory Data:  Results for IMMANUEL LOVETT (MRN [id number]) as of 2/1/2018 14:50   2/1/2018 11:48   WBC 5.9   Hemoglobin 7.8 (L)   Hematocrit 23.3 (L)   Platelet Count 48 (LL)   RBC Count 2.30 (L)    (H)   MCH 33.9 (H)   MCHC 33.5   RDW 16.3 (H)   Diff Method Manual Differential   % Neutrophils 79.8   % Lymphocytes 11.4   % Monocytes 4.4   % Eosinophils 0.0   % Basophils 0.0   % Myelocytes 0.9   % Promyelocytes 2.6   % Other Cells 0.9   Nucleated RBCs 1 (H)   Absolute Neutrophil 4.7   Absolute Lymphocytes 0.7 (L)   Absolute Monocytes 0.3   Absolute Eosinophils 0.0   Absolute Basophils 0.0   Absolute Myelocytes 0.1 (H)   Absolute Promyelocytes 0.2 (H)   Absolute Other Cells 0.1 (H)   Absolute Nucleated RBC 0.1   Anisocytosis Slight   Poikilocytosis Slight   Polychromasia Slight   Teardrop Cells Slight       Assessment and Plan:    Dizziness/vertigo, falls: She has had 3 falls in the past week, but none since holding lasix  "on 1/29. All falls occurred after standing from sitting and she still gets a little lightheaded when standing up. No orthostasis on VS on 1/29. BP was 141/79 on first check, but was 10/59 in infusion. Neuro exam has been unremarkable. Her last LP on 1/19 was negative. CT head on 1/29 with no acute intracranial pathology. Maybe secondary to diuretics. She is also on MS Contin, oxycodone prn and scheduled valium for pain as below, which may all be contributing.  --Called by lab who are sending blood for pathology review as blast seen on peripheral smear    DLBCL, \"double-hit\"-like, currently on treatment with DA-R-EPOCH  Now s/p 6 cycles with CR on PET/CT after 4.   --Neulasta given on 1/24  --She is set up for labs on 2/5 and 2/8.   --PET/CT on 2/27 and Dr. Sauceda on 2/28    Heme: WBC 5.9, ANC 4.7, Hgb 7.8, platelets 48k.  Transfuse if Hgb<8 and Plt<20k.   --Will receive 2 units pRBCs today     ID: No active concerns. No longer neutropenic  Continue ppx ACV 400mg BID  May discontinue Levaquin 250mg daily and fluconazole 200mg daily   --She is applying neosporin to abrasion on left hand.    Edema: Peripheral edema improved since discharge. Hold Lasix. Continue HCTZ, as this medication was started by Dr. Castro to help reduce urinary calcium excretion. Encouraged wearing compression stockings.     Mucositis: Small ulcer on mucosa of lower lip. Recommend salt/soda rinses. She also has MMW at home.     History of stage 1, ER/AZ+, HER2- breast cancer s/p bilateral mastectomies and BENJIE/BSO  Follows with Dr. Crawley. Oncotype recurrence score 11%. Was initially on Arimidex (0829-7182) but changed to Tamoxifen due to arthralgias. Tamoxifen held since 10/5 and continue to hold with chemotherapy. Last f/u Dr. Crawley was on 11/27/17.     History of papillary thyroid cancer, s/p total thyroidectomy  Follows with Dr. Castro. Has post surgical hypothyroidism. Continue levothyroxine and calcitriol as prescribed. Neck u/s on " 11/2 shows no evidence of disease.     History of Rachael en Y gastric bypass  Continue supplements (calcium citrate-vitamin D, vitamin D3, magnesium citrate). Also has iron deficiency anemia likely from poor absorption and per Dr. Sauceda, she should continue 325mg PO ferrous sulfate TID.      Chronic chest wall pain s/p mastectomies  Follows with Dr. Benitez. Palliative care to continue to prescribe pain medications. Taking MS Contin 30/30/60 mg tid and oxycodone prn, currently 15 mg qid for breakthrough pain, and celebrex.     Nausea. Mild. No vomiting. Continue Zofran, compazine prn.     Constipation.  Under control with Colace, fiber tabs, and MiraLax when needed.     Selina Elizondo PA-C  [facility]  [address], [place] [id number] [phone]  [facility]      COTY Naidu

## 2018-02-01 NOTE — NURSING NOTE
"Oncology Rooming Note    February 1, 2018 10:40 AM   Tisha Arias is a 57 year old female who presents for:    Chief Complaint   Patient presents with     Port Draw     port accessed and labs drawn by rn.  vs taken.     Oncology Clinic Visit     Lymphoma F/U.     Initial Vitals: /79 (BP Location: Left arm, Patient Position: Sitting, Cuff Size: Adult Regular)  Pulse 123  Temp 97.8  F (36.6  C) (Oral)  Resp 16  Ht 1.651 m (5' 5\")  Wt 86.9 kg (191 lb 8 oz)  SpO2 100%  BMI 31.87 kg/m2 Estimated body mass index is 31.87 kg/(m^2) as calculated from the following:    Height as of this encounter: 1.651 m (5' 5\").    Weight as of this encounter: 86.9 kg (191 lb 8 oz). Body surface area is 2 meters squared.  Extreme Pain (8) Comment: Data Unavailable   No LMP recorded. Patient has had a hysterectomy.  Allergies reviewed: Yes  Medications reviewed: Yes    Medications: Medication refills not needed today.  Pharmacy name entered into [employer]: [id number] - [place], [place] - [address] [address] [place]    Clinical concerns: None Selina Elizondo was NOT notified.    7 minutes for nursing intake (face to face time)     Naima Saavedra LPN              "

## 2018-02-02 LAB
ANISOCYTOSIS BLD QL SMEAR: SLIGHT
BASOPHILS # BLD AUTO: 0 10E9/L (ref 0–0.2)
BASOPHILS NFR BLD AUTO: 0 %
DACRYOCYTES BLD QL SMEAR: SLIGHT
DIFFERENTIAL METHOD BLD: ABNORMAL
EOSINOPHIL # BLD AUTO: 0 10E9/L (ref 0–0.7)
EOSINOPHIL NFR BLD AUTO: 0 %
ERYTHROCYTE [DISTWIDTH] IN BLOOD BY AUTOMATED COUNT: 16.3 % (ref 10–15)
HCT VFR BLD AUTO: 23.3 % (ref 35–47)
HGB BLD-MCNC: 7.8 G/DL (ref 11.7–15.7)
LYMPHOCYTES # BLD AUTO: 0.7 10E9/L (ref 0.8–5.3)
LYMPHOCYTES NFR BLD AUTO: 11.4 %
MACROCYTES BLD QL SMEAR: PRESENT
MCH RBC QN AUTO: 33.9 PG (ref 26.5–33)
MCHC RBC AUTO-ENTMCNC: 33.5 G/DL (ref 31.5–36.5)
MCV RBC AUTO: 101 FL (ref 78–100)
MONOCYTES # BLD AUTO: 0.3 10E9/L (ref 0–1.3)
MONOCYTES NFR BLD AUTO: 4.4 %
MYELOCYTES # BLD: 0.1 10E9/L
MYELOCYTES NFR BLD MANUAL: 0.9 %
NEUTROPHILS # BLD AUTO: 4.7 10E9/L (ref 1.6–8.3)
NEUTROPHILS NFR BLD AUTO: 79.8 %
NRBC # BLD AUTO: 0.1 10*3/UL
NRBC BLD AUTO-RTO: 1 /100
OTHER CELLS # BLD MANUAL: 0.1 10E9/L
OTHER CELLS NFR BLD MANUAL: 0.9 %
PLATELET # BLD AUTO: 48 10E9/L (ref 150–450)
PLATELET # BLD EST: ABNORMAL 10*3/UL
POIKILOCYTOSIS BLD QL SMEAR: SLIGHT
POLYCHROMASIA BLD QL SMEAR: SLIGHT
PROMYELOCYTES # BLD MANUAL: 0.2 10E9/L
PROMYELOCYTES NFR BLD MANUAL: 2.6 %
RBC # BLD AUTO: 2.3 10E12/L (ref 3.8–5.2)
WBC # BLD AUTO: 5.9 10E9/L (ref 4–11)

## 2018-02-04 DIAGNOSIS — R07.89 CHEST WALL PAIN: ICD-10-CM

## 2018-02-05 ENCOUNTER — INFUSION THERAPY VISIT (OUTPATIENT)
Dept: INFUSION THERAPY | Facility: CLINIC | Age: 58
End: 2018-02-05
Payer: COMMERCIAL

## 2018-02-05 VITALS
SYSTOLIC BLOOD PRESSURE: 115 MMHG | TEMPERATURE: 98.2 F | OXYGEN SATURATION: 98 % | RESPIRATION RATE: 18 BRPM | DIASTOLIC BLOOD PRESSURE: 61 MMHG | HEART RATE: 93 BPM

## 2018-02-05 DIAGNOSIS — E87.6 HYPOKALEMIA: ICD-10-CM

## 2018-02-05 DIAGNOSIS — C85.10 HIGH GRADE B-CELL LYMPHOMA (H): Primary | ICD-10-CM

## 2018-02-05 DIAGNOSIS — C83.33 DIFFUSE LARGE B-CELL LYMPHOMA OF INTRA-ABDOMINAL LYMPH NODES (H): ICD-10-CM

## 2018-02-05 LAB
ABO + RH BLD: NORMAL
ABO + RH BLD: NORMAL
ANISOCYTOSIS BLD QL SMEAR: SLIGHT
BASOPHILS # BLD AUTO: 0 10E9/L (ref 0–0.2)
BASOPHILS NFR BLD AUTO: 0 %
BLD GP AB SCN SERPL QL: NORMAL
BLOOD BANK CMNT PATIENT-IMP: NORMAL
DACRYOCYTES BLD QL SMEAR: SLIGHT
DIFFERENTIAL METHOD BLD: ABNORMAL
EOSINOPHIL # BLD AUTO: 0 10E9/L (ref 0–0.7)
EOSINOPHIL NFR BLD AUTO: 0.9 %
ERYTHROCYTE [DISTWIDTH] IN BLOOD BY AUTOMATED COUNT: 17.4 % (ref 10–15)
HCT VFR BLD AUTO: 29.5 % (ref 35–47)
HGB BLD-MCNC: 9.4 G/DL (ref 11.7–15.7)
LYMPHOCYTES # BLD AUTO: 0.2 10E9/L (ref 0.8–5.3)
LYMPHOCYTES NFR BLD AUTO: 4.4 %
MACROCYTES BLD QL SMEAR: PRESENT
MCH RBC QN AUTO: 32.2 PG (ref 26.5–33)
MCHC RBC AUTO-ENTMCNC: 31.9 G/DL (ref 31.5–36.5)
MCV RBC AUTO: 101 FL (ref 78–100)
METAMYELOCYTES # BLD: 0.1 10E9/L
METAMYELOCYTES NFR BLD MANUAL: 1.8 %
MONOCYTES # BLD AUTO: 0.3 10E9/L (ref 0–1.3)
MONOCYTES NFR BLD AUTO: 6.2 %
MYELOCYTES # BLD: 0.1 10E9/L
MYELOCYTES NFR BLD MANUAL: 2.7 %
NEUTROPHILS # BLD AUTO: 3.9 10E9/L (ref 1.6–8.3)
NEUTROPHILS NFR BLD AUTO: 84 %
PLATELET # BLD AUTO: 136 10E9/L (ref 150–450)
PLATELET # BLD EST: ABNORMAL 10*3/UL
POIKILOCYTOSIS BLD QL SMEAR: SLIGHT
RBC # BLD AUTO: 2.92 10E12/L (ref 3.8–5.2)
SPECIMEN EXP DATE BLD: NORMAL
WBC # BLD AUTO: 4.6 10E9/L (ref 4–11)

## 2018-02-05 PROCEDURE — 86900 BLOOD TYPING SEROLOGIC ABO: CPT

## 2018-02-05 PROCEDURE — 85025 COMPLETE CBC W/AUTO DIFF WBC: CPT | Performed by: PHYSICIAN ASSISTANT

## 2018-02-05 PROCEDURE — 36591 DRAW BLOOD OFF VENOUS DEVICE: CPT

## 2018-02-05 PROCEDURE — 86901 BLOOD TYPING SEROLOGIC RH(D): CPT

## 2018-02-05 PROCEDURE — 25000128 H RX IP 250 OP 636

## 2018-02-05 PROCEDURE — 86850 RBC ANTIBODY SCREEN: CPT

## 2018-02-05 RX ORDER — HEPARIN SODIUM (PORCINE) LOCK FLUSH IV SOLN 100 UNIT/ML 100 UNIT/ML
500 SOLUTION INTRAVENOUS ONCE
Status: COMPLETED | OUTPATIENT
Start: 2018-02-05 | End: 2018-02-05

## 2018-02-05 RX ORDER — HEPARIN SODIUM (PORCINE) LOCK FLUSH IV SOLN 100 UNIT/ML 100 UNIT/ML
SOLUTION INTRAVENOUS
Status: DISCONTINUED
Start: 2018-02-05 | End: 2018-02-05 | Stop reason: HOSPADM

## 2018-02-05 RX ORDER — POTASSIUM CHLORIDE 1500 MG/1
20 TABLET, EXTENDED RELEASE ORAL 2 TIMES DAILY
Qty: 30 TABLET | Refills: 0 | Status: CANCELLED | OUTPATIENT
Start: 2018-02-05

## 2018-02-05 RX ADMIN — SODIUM CHLORIDE, PRESERVATIVE FREE 500 UNITS: 5 INJECTION INTRAVENOUS at 09:20

## 2018-02-05 ASSESSMENT — PAIN SCALES - GENERAL: PAINLEVEL: EXTREME PAIN (8)

## 2018-02-05 NOTE — MR AVS SNAPSHOT
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  [address]  [facility] [address]   [phone]            Feb 27, 2018  6:45 AM CST   (Arrive by 6:30 AM)   PET ONCOLOGY WHOLE BODY with UUPET1   [facility] PET CT ([facility])    [address] [facility] [id number] [phone]           Tell your doctor:   If there is any chance you may be pregnant or if you are breastfeeding.   If you have problems lying in small spaces (claustrophobia). If you do, your doctor may give you medicine to help you relax. If you have diabetes:   Have your exam early in the morning. Your blood glucose will go up as the day goes by.   Your glucose level must be 180 or less at the start of the exam. Please take any medicines you need to ensure this blood glucose level.   If you are taking insulin in the morning take with breakfast by 6 am and schedule procedure between 12 and 2:15 pm.   If you are taking insulin at night take nightly dose, fast overnight, schedule procedure before 10 am.   If you take insulin both morning and night take morning dose by 6am and schedule procedure between 12 and 2:15 pm.   24 hours before your scan: Don t do any heavy exercise. (No jogging, aerobics or other workouts.) Exercise will make your pictures less accurate.  7 hours before your  scan: Eat a low carb, high protein meal (Lean meats, seafood, beans, soy, low-fat dairy, eggs, nuts & seeds). 6 hours before your scan:   Stop all food and liquids (except water).   Do not chew gum or suck on mints.   If you need to take medicine with food, you may take it with a few crackers.  Please call your Imaging Department at your exam site with any questions.            Feb 28, 2018  9:30 AM CST   (Arrive by 9:15 AM)   Return Visit with Edu Benitez MD   [facility] ([facility])    [address]  [address]  [facility] [address]   [phone]            Feb 28, 2018 10:15 AM CST   RETURN ONC with Garima Sauceda MD   [facility] ([facility])    [address] [place] [address]  [address]  [facility] [address] [phone]            May 21, 2018  4:20 PM CDT   (Arrive by 4:05 PM)   RETURN ENDOCRINE with Avelina Castro MD   [facility] ([facility])    [address] [place] [address]  3rd Floor  [facility] [address]   [phone]            Jun 11, 2018  4:00 PM CDT   (Arrive by 3:45 PM)   Return Visit with Shahla Crawley MD   [facility] ([facility])    [address] [place] [address]  [address]  [facility] [address] [phone]              Who to contact     If you have questions or need follow up information about today's clinic visit or your schedule please contact [facility], [place], [facility] SERVICES directly at [phone].  Normal or non-critical lab and imaging results will be communicated to you by MyChart, letter or phone within 4 business days after the clinic has received the results. If you do not hear from us within 7 days, please contact the clinic through MyChart or phone. If you have a critical or abnormal lab result, we will notify you by phone as soon as possible.  Submit refill requests through  DataContact or call your pharmacy and they will forward the refill request to us. Please allow 3 business days for your refill to be completed.          Additional Information About Your Visit        CDNlionhart Information     DataContact gives you secure access to your electronic health record. If you see a primary care provider, you can also send messages to your care team and make appointments. If you have questions, please call your primary care clinic.  If you do not have a primary care provider, please call [phone] and they will assist you.        Care EveryWhere ID     This is your Care EveryWhere ID. This could be used by other organizations to access your [place] medical records  [id number]        Your Vitals Were     Pulse Temperature Respirations Pulse Oximetry          93 98.2  F (36.8  C) 18 98%         Blood Pressure from Last 3 Encounters:   02/05/18 115/61   02/01/18 105/70   02/01/18 141/79    Weight from Last 3 Encounters:   02/01/18 86.9 kg (191 lb 8 oz)   01/29/18 81.1 kg (178 lb 14.4 oz)   01/24/18 86.3 kg (190 lb 4.8 oz)              We Performed the Following     ABO/Rh type and screen     CBC with platelets differential          Today's Medication Changes          These changes are accurate as of 2/5/18 10:49 AM.  If you have any questions, ask your nurse or doctor.               These medicines have changed or have updated prescriptions.        Dose/Directions    calcitRIOL 0.25 MCG capsule   Commonly known as:  ROCALTROL   This may have changed:    - how much to take  - how to take this  - when to take this  - additional instructions   Used for:  Postsurgical hypothyroidism, Papillary carcinoma, follicular variant (H), Metastasis to cervical lymph node (H)        TAKE 2 CAPSULES BY MOUTH EVERY MORNING AND TAKE 1 CAPSULE BY MOUTH EVERY NIGHT AT BEDTIME   Quantity:  90 capsule   Refills:  0       cyclobenzaprine 10 MG tablet   Commonly known as:  FLEXERIL   This may have changed:  when to take  this   Used for:  High grade B-cell lymphoma (H)        Dose:  10 mg   Take 1 tablet (10 mg) by mouth 3 times daily as needed   Quantity:  30 tablet   Refills:  0       levothyroxine 112 MCG tablet   Commonly known as:  SYNTHROID/LEVOTHROID   This may have changed:    - how much to take  - additional instructions   Used for:  Postsurgical hypothyroidism, Papillary carcinoma, follicular variant (H), Postsurgical hypoparathyroidism (H), Thyroid cancer (H)        Mon-Sat: (2 tabs daily) Sunday: 3 tabs   Quantity:  189 tablet   Refills:  3       ondansetron 8 MG ODT tab   Commonly known as:  ZOFRAN-ODT   This may have changed:  when to take this   Used for:  High grade B-cell lymphoma (H), Nausea and vomiting, intractability of vomiting not specified, unspecified vomiting type        Dose:  8 mg   Take 1 tablet (8 mg) by mouth every 8 hours as needed   Quantity:  30 tablet   Refills:  1       oxyCODONE IR 30 MG tablet   Commonly known as:  ROXICODONE   This may have changed:  when to take this   Used for:  High grade B-cell lymphoma (H)        Dose:  30 mg   Take 1 tablet (30 mg) by mouth every 4 hours as needed for moderate to severe pain   Quantity:  30 tablet   Refills:  0                Primary Care Provider Office Phone # Fax #    Shahla Raul Crawley [phone]       [address] [place] [address]        Equal Access to Services     RODRIGO ZAMORA AH: Hadii sacha marley hadasho Soesteban, waaxda luqadaha, qaybta kaalmada adeerrolyada, ranjan martin. So [facility] [phone].    ATENCIÓN: Si habla español, tiene a stiles disposición servicios gratuitos de asistencia lingüística. Oneil al [phone].    We comply with applicable federal civil rights laws and [place] laws. We do not discriminate on the basis of race, color, national origin, age, disability, sex, sexual orientation, or gender identity.            Thank you!     Thank you for choosing [facility], [place], [facility]  SERVICES  for your care. Our goal is always to provide you with excellent care. Hearing back from our patients is one way we can continue to improve our services. Please take a few minutes to complete the written survey that you may receive in the mail after your visit with us. Thank you!             Your Updated Medication List - Protect others around you: Learn how to safely use, store and throw away your medicines at www.disposemymeds.org.          This list is accurate as of 2/5/18 10:49 AM.  Always use your most recent med list.                   Brand Name Dispense Instructions for use Diagnosis    acetaminophen 325 MG tablet    TYLENOL    100 tablet    Take 2 tablets (650 mg) by mouth every 4 hours as needed for mild pain or fever    High grade B-cell lymphoma (H)       acyclovir 400 MG tablet    ZOVIRAX    60 tablet    Take 1 tablet (400 mg) by mouth 2 times daily    High grade B-cell lymphoma (H)       aluminum chloride 20 % external solution    DRYSOL    60 mL    Apply topically At Bedtime    Rash, Intertrigo       AZMACORT IN      Inhale 2 puffs into the lungs as needed        bisacodyl 5 MG EC tablet     30 tablet    Take 3 tablets (15 mg) by mouth daily as needed for constipation    Constipation, unspecified constipation type       calcitRIOL 0.25 MCG capsule    ROCALTROL    90 capsule    TAKE 2 CAPSULES BY MOUTH EVERY MORNING AND TAKE 1 CAPSULE BY MOUTH EVERY NIGHT AT BEDTIME    Postsurgical hypothyroidism, Papillary carcinoma, follicular variant (H), Metastasis to cervical lymph node (H)       calcium citrate-vitamin D 315-250 MG-UNIT Tabs per tablet    CALCIUM CITRATE +D    20 tablet    Take 2 tablets by mouth twice a week    High grade B-cell lymphoma (H), Hypocalcemia       celecoxib 200 MG capsule    celeBREX    56 capsule    Take 1 capsule (200 mg) by mouth 2 times daily    Chest wall pain       cetirizine 10 MG tablet    ZYRTEC ALLERGY    90 tablet    Take 1 tablet (10 mg) by mouth 3 times daily  On hold for lab test.    High grade B-cell lymphoma (H)       COMPOUND CONTAINING CONTROLLED SUBSTANCE - PHARMACY TO MIX COMPOUNDED MEDICATION    CMPD RX    120 g    Apply small amount to affected area two times daily.    Neoplasm related pain       cromolyn 4 % ophthalmic solution    OPTICROM    10 mL    Place 1 drop into both eyes 4 times daily    Idiopathic mast cell activation syndrome (H)       cromolyn sodium 5.2 MG/ACT Aers Inhaler    NASALCROM    1 Bottle    SPRAY ONE SPRAY( 1 ML) IN NOSTRIL DAILY    Mast cell disease, systemic       [employer] GUMMY BE[employer] CHILDREN Chew     120 tablet    Take 5 g by mouth daily (2 gummy= 5 g =1 serving)    High grade B-cell lymphoma (H)       cyclobenzaprine 10 MG tablet    FLEXERIL    30 tablet    Take 1 tablet (10 mg) by mouth 3 times daily as needed    High grade B-cell lymphoma (H)       dexamethasone 4 MG tablet    DECADRON    4 tablet    Take 2 tablets (8 mg) by mouth daily for 2 days Please take on 1/23 and 1/24.    High grade B-cell lymphoma (H)       diazepam 5 MG tablet    VALIUM     Take 1 tablet (5 mg) by mouth 3 times daily For muscle spasms    Chest wall pain       diclofenac 1 % Gel topical gel    VOLTAREN    100 g    Apply affected area two times daily PRN using enclosed dosing card.    Myofascial pain       EPINEPHrine 0.3 MG/0.3ML injection 2-pack    EPIPEN 2-CARIDAD    0.6 mL    Inject 0.3 mLs (0.3 mg) into the muscle once as needed for anaphylaxis        fluconazole 200 MG tablet    DIFLUCAN    30 tablet    Take 1 tablet (200 mg) by mouth daily    High grade B-cell lymphoma (H)       furosemide 20 MG tablet    LASIX    60 tablet    Take 1 tablet (20 mg) by mouth 2 times daily    Localized edema       hydrochlorothiazide 12.5 MG capsule    MICROZIDE    30 capsule    Take 1 capsule (12.5 mg) by mouth daily    Hypocalcemia       levofloxacin 250 MG tablet    LEVAQUIN    30 tablet    Take 1 tablet (250 mg) by mouth daily    High grade B-cell lymphoma (H)        levothyroxine 112 MCG tablet    SYNTHROID/LEVOTHROID    189 tablet    Mon-Sat: (2 tabs daily) Sunday: 3 tabs    Postsurgical hypothyroidism, Papillary carcinoma, follicular variant (H), Postsurgical hypoparathyroidism (H), Thyroid cancer (H)       lidocaine 5 % ointment    XYLOCAINE    350 g    Apply quarter size amount to chest and back up to 3 times daily as needed for pain.    Chest wall pain       lidocaine visc 2% 2.5mL/5mL & maalox/mylanta w/ simeth 2.5mL/5mL & diphenhydrAMINE 5mg/5mL Susp suspension    [facility]    360 mL    Swish and spit 10 mLs in mouth every 6 hours as needed for mouth sores    Stomatitis and mucositis, High grade B-cell lymphoma (H)       lidocaine-prilocaine cream    EMLA    30 g    Apply topically as needed (port access pain.)    Neoplasm related pain, Chest wall pain       methocarbamol 750 MG tablet    ROBAXIN     Take 1 tablet (750 mg) by mouth 4 times daily . Ok to take a 5th Robaxin on very severe days.    Myofascial pain       montelukast 10 MG tablet    SINGULAIR    60 tablet    Take 2 tablets (20 mg) by mouth 2 times daily    Idiopathic mast cell activation syndrome (H)       morphine 30 MG 12 hr tablet    MS CONTIN    120 tablet    Take 1 tablet (30 mg) by mouth every 8 hours . Take an addition pill at night such that your evening dose is 60mg    Neoplasm related pain       ondansetron 8 MG ODT tab    ZOFRAN-ODT    30 tablet    Take 1 tablet (8 mg) by mouth every 8 hours as needed    High grade B-cell lymphoma (H), Nausea and vomiting, intractability of vomiting not specified, unspecified vomiting type       * order for DME     2 Units    Equipment being ordered: compression stockings. 20-30 mm or 30 - 40 mm as patient can tolerate. Physical therapy to determine if they should be above or below the knee.    Venous stasis       * order for DME     2 Device    Equipment being ordered: compression bra    Malignant neoplasm of right female breast, unspecified site of  breast       * order for DME     1 Units    Compression stockings, medium grade, please measure and fit. Please dispense a pair.    Peripheral edema       oxyCODONE IR 30 MG tablet    ROXICODONE    30 tablet    Take 1 tablet (30 mg) by mouth every 4 hours as needed for moderate to severe pain    High grade B-cell lymphoma (H)       polyethylene glycol powder    MIRALAX    510 g    Take 17 g (1 capful) by mouth daily    Acute constipation       potassium chloride SA 20 MEQ CR tablet    KLOR-CON    30 tablet    Take 1 tablet (20 mEq) by mouth 2 times daily    Hypokalemia       PROBIOTIC DAILY PO      Take 1 capsule by mouth daily Lacto acid bifidobacterium    Breast cancer, unspecified laterality, Thyroid cancer (H), Chronic arthralgias of knees and hips, unspecified laterality       prochlorperazine 10 MG tablet    COMPAZINE     Take 10 mg by mouth as needed        ranitidine 75 MG tablet    ZANTAC    90 tablet    Take 1 tablet (75 mg) by mouth 3 times daily    Mast cell disease, systemic       senna-docusate 8.6-50 MG per tablet    SENOKOT-S;PERICOLACE    60 tablet    Take 1-2 tablets by mouth 2 times daily as needed for constipation    Acute constipation       SUMAtriptan 50 MG tablet    IMITREX     Take 50 mg by mouth as needed        triamcinolone 0.025 % ointment    KENALOG     Apply topically as needed        * UNABLE TO FIND      3 tablets 3 times daily MEDICATION NAME: calcium D-Glucarate  3 caps contain 180mg of elemental calcium.        * UNABLE TO FIND      Take 2 capsules by mouth 3 times daily Muscle Mag. 2 caps contain B1 20mg, B2 20mg, B6 10mg, magesium 20mg, manganese 2mg.        * UNABLE TO FIND      2 tablets 3 times daily MEDICATION NAME: Digestzymes    Thyroid cancer (H), Postsurgical hypothyroidism, Postsurgical hypoparathyroidism (H)       * UNABLE TO FIND      1 tablet daily MEDICATION NAME: Pure Encapsulations    Thyroid cancer (H), Postsurgical hypothyroidism, Postsurgical hypoparathyroidism  (H)       VENTOLIN  (90 BASE) MCG/ACT Inhaler   Generic drug:  albuterol     18 g    INHALE 2 PUFFS INTO THE LUNGS EVERY 4 HOURS AS NEEDED FOR SHORTNESS OF BREATH OR DIFFICULT BREATHING OR WHEEZING    Mild intermittent asthma without complication       vitamin D3 2000 UNITS Caps     60 capsule    Take 5,000 Units by mouth daily Takes 2 tabs daily    Thyroid cancer (H), Postsurgical hypothyroidism, Papillary carcinoma, follicular variant (H), Postsurgical hypoparathyroidism (H)       * Notice:  This list has 7 medication(s) that are the same as other medications prescribed for you. Read the directions carefully, and ask your doctor or other care provider to review them with you.

## 2018-02-05 NOTE — PROGRESS NOTES
Infusion Nursing Note:  Tisha Arias presents today for possible RBC transfusion.    Patient seen by provider today: No   present during visit today: Not Applicable.    Note: Patient reports she may have an open area on her labia, that stings when she urinates.    In-basket message sent to Sohail and recommended to patient that this be examined.  She agrees with this plan.    Intravenous Access:  Implanted Port.    Treatment Conditions:  Lab Results   Component Value Date    HGB 9.4 02/05/2018     Lab Results   Component Value Date    WBC 4.6 02/05/2018      Lab Results   Component Value Date    ANEU 3.9 02/05/2018     Lab Results   Component Value Date     02/05/2018      Results reviewed, labs did NOT meet treatment parameters: no RBC transfusion needed today.  She will RTC Thursday for another blood check.      Post Infusion Assessment:  Site patent and intact, free from redness, edema or discomfort.  No evidence of extravasations.  Access discontinued per protocol.  Instructed patient on EMLA application prior to leaving home; apply a small dab ( pea size) to port site.  DO NOT rub in. Lightly cover with occlusive, non gauze dressing such as tegaderm or plastic wrap..    Discharge Plan:   Discharge instructions reviewed with: Patient.  Patient discharged in stable condition accompanied by: self.  Departure Mode: Ambulatory.    Isa Green RN

## 2018-02-06 ENCOUNTER — CARE COORDINATION (OUTPATIENT)
Dept: ONCOLOGY | Facility: CLINIC | Age: 58
End: 2018-02-06

## 2018-02-06 DIAGNOSIS — C83.30 DIFFUSE LARGE B CELL LYMPHOMA (H): Primary | ICD-10-CM

## 2018-02-06 NOTE — TELEPHONE ENCOUNTER
Received refill request from pharmacy for refill of diazepam.     Last refill: 1/1/2018 - prior to this refill was 12/31/2017 -- script dated 1/1/2018 is on filed at [facility] but never filled. patient prefers local pharmacy. Script cannot be transferred from [facility] unless it has been filled there once.  Last seen during recent IP admission by palliative team.  Scheduled for follow up 2/28/2018     Will route request to MD for review.     Reviewed [place]  Report.

## 2018-02-06 NOTE — PROGRESS NOTES
Dr. Sauceda would like pt to provide urine sample for UA, LM for pt re: this recommendation and that she can return my call with questions/concerns/to discuss sx further  Left second message for pt on work VM and sent [employer] message. She can do UA at lab appt tomorrow.

## 2018-02-07 RX ORDER — DIAZEPAM 5 MG
TABLET ORAL
Qty: 90 TABLET | Refills: 1 | Status: SHIPPED | OUTPATIENT
Start: 2018-02-07 | End: 2018-04-11

## 2018-02-08 ENCOUNTER — INFUSION THERAPY VISIT (OUTPATIENT)
Dept: INFUSION THERAPY | Facility: CLINIC | Age: 58
End: 2018-02-08
Payer: COMMERCIAL

## 2018-02-08 VITALS
DIASTOLIC BLOOD PRESSURE: 60 MMHG | RESPIRATION RATE: 18 BRPM | OXYGEN SATURATION: 97 % | SYSTOLIC BLOOD PRESSURE: 99 MMHG | TEMPERATURE: 98 F | HEART RATE: 112 BPM

## 2018-02-08 DIAGNOSIS — C85.10 HIGH GRADE B-CELL LYMPHOMA (H): ICD-10-CM

## 2018-02-08 LAB
BASOPHILS # BLD AUTO: 0 10E9/L (ref 0–0.2)
BASOPHILS NFR BLD AUTO: 0.5 %
DIFFERENTIAL METHOD BLD: ABNORMAL
EOSINOPHIL # BLD AUTO: 0 10E9/L (ref 0–0.7)
EOSINOPHIL NFR BLD AUTO: 0.5 %
ERYTHROCYTE [DISTWIDTH] IN BLOOD BY AUTOMATED COUNT: 17.8 % (ref 10–15)
HCT VFR BLD AUTO: 30.9 % (ref 35–47)
HGB BLD-MCNC: 9.6 G/DL (ref 11.7–15.7)
IMM GRANULOCYTES # BLD: 0.1 10E9/L (ref 0–0.4)
IMM GRANULOCYTES NFR BLD: 1.3 %
LYMPHOCYTES # BLD AUTO: 0.4 10E9/L (ref 0.8–5.3)
LYMPHOCYTES NFR BLD AUTO: 10.9 %
MCH RBC QN AUTO: 31.9 PG (ref 26.5–33)
MCHC RBC AUTO-ENTMCNC: 31.1 G/DL (ref 31.5–36.5)
MCV RBC AUTO: 103 FL (ref 78–100)
MONOCYTES # BLD AUTO: 0.7 10E9/L (ref 0–1.3)
MONOCYTES NFR BLD AUTO: 18.6 %
NEUTROPHILS # BLD AUTO: 2.6 10E9/L (ref 1.6–8.3)
NEUTROPHILS NFR BLD AUTO: 68.2 %
NRBC # BLD AUTO: 0 10*3/UL
NRBC BLD AUTO-RTO: 0 /100
PLATELET # BLD AUTO: 224 10E9/L (ref 150–450)
RBC # BLD AUTO: 3.01 10E12/L (ref 3.8–5.2)
WBC # BLD AUTO: 3.8 10E9/L (ref 4–11)

## 2018-02-08 PROCEDURE — 25000128 H RX IP 250 OP 636

## 2018-02-08 PROCEDURE — 36591 DRAW BLOOD OFF VENOUS DEVICE: CPT

## 2018-02-08 PROCEDURE — 85025 COMPLETE CBC W/AUTO DIFF WBC: CPT | Performed by: PHYSICIAN ASSISTANT

## 2018-02-08 RX ORDER — HEPARIN SODIUM (PORCINE) LOCK FLUSH IV SOLN 100 UNIT/ML 100 UNIT/ML
SOLUTION INTRAVENOUS
Status: COMPLETED
Start: 2018-02-08 | End: 2018-02-08

## 2018-02-08 RX ORDER — HEPARIN SODIUM (PORCINE) LOCK FLUSH IV SOLN 100 UNIT/ML 100 UNIT/ML
500 SOLUTION INTRAVENOUS ONCE
Status: COMPLETED | OUTPATIENT
Start: 2018-02-08 | End: 2018-02-08

## 2018-02-08 RX ADMIN — SODIUM CHLORIDE, PRESERVATIVE FREE 500 UNITS: 5 INJECTION INTRAVENOUS at 09:24

## 2018-02-08 RX ADMIN — HEPARIN SODIUM (PORCINE) LOCK FLUSH IV SOLN 100 UNIT/ML 500 UNITS: 100 SOLUTION at 09:24

## 2018-02-08 ASSESSMENT — PAIN SCALES - GENERAL: PAINLEVEL: SEVERE PAIN (7)

## 2018-02-08 NOTE — MR AVS SNAPSHOT
After Visit Summary   2/8/2018    Tisha Arias    MRN: [id number]           Patient Information     Date Of Birth          1960        Visit Information        Provider Department      2/8/2018 8:00 AM UR CH 04 [facility] [place], Infusion Services        Today's Diagnoses     High grade B-cell lymphoma (H)           Follow-ups after your visit        Your next 10 appointments already scheduled     Feb 27, 2018  6:45 AM CST   (Arrive by 6:30 AM)   PET ONCOLOGY WHOLE BODY with UUPET1   [facility] PET CT ([facility], [facility])    [address] [address] [phone]           Tell your doctor:   If there is any chance you may be pregnant or if you are breastfeeding.   If you have problems lying in small spaces (claustrophobia). If you do, your doctor may give you medicine to help you relax. If you have diabetes:   Have your exam early in the morning. Your blood glucose will go up as the day goes by.   Your glucose level must be 180 or less at the start of the exam. Please take any medicines you need to ensure this blood glucose level.   If you are taking insulin in the morning take with breakfast by 6 am and schedule procedure between 12 and 2:15 pm.   If you are taking insulin at night take nightly dose, fast overnight, schedule procedure before 10 am.   If you take insulin both morning and night take morning dose by 6am and schedule procedure between 12 and 2:15 pm.   24 hours before your scan: Don t do any heavy exercise. (No jogging, aerobics or other workouts.) Exercise will make your pictures less accurate.  7 hours before your scan: Eat a low carb, high protein meal (Lean meats, seafood, beans, soy, low-fat dairy, eggs, nuts & seeds). 6 hours before your scan:   Stop all food and liquids (except water).   Do not chew gum or suck on mints.   If you need to take medicine with food, you may take it with a few crackers.  Please  call your Imaging Department at your exam site with any questions.            Feb 28, 2018  9:30 AM CST   (Arrive by 9:15 AM)   Return Visit with Edu Benitez MD   [facility] ([facility])    [address]  [address]  [facility] [address]   [phone]            Feb 28, 2018 10:15 AM CST   RETURN ONC with Garima Sauceda MD   [facility] Blood and Marrow Transplant ([facility])    [address] [place] [address]  [address]  [facility] [address]   [phone]            May 21, 2018  4:20 PM CDT   (Arrive by 4:05 PM)   RETURN ENDOCRINE with Avelina Castro MD   [facility] Endocrinology ([facility])    [address] [place] [address]  3rd Floor  [facility] [address]   [phone]            Jun 11, 2018  4:00 PM CDT   (Arrive by 3:45 PM)   Return Visit with Shahla Crawley MD   [facility] ([facility])    [address] [place] [address]  [address]  [facility] [address]   [phone]              Who to contact     If you have questions or need follow up information about today's clinic visit or your schedule please contact [facility], [place], [facility] SERVICES directly at [phone].  Normal or non-critical lab and imaging results will be communicated to you by MyChart, letter or phone within 4 business days after the clinic has received the results. If you do not hear from us within 7 days, please contact the clinic through Retrievehart or phone. If you have a critical or abnormal lab result, we will notify you by phone as soon as possible.  Submit refill requests through V[employer]block or call your pharmacy and they will forward the refill request to us. Please allow 3 business days for your refill to be completed.          Additional Information About Your Visit        [employer] Information     V[employer]block gives you secure access to your electronic health record. If you see a primary  care provider, you can also send messages to your care team and make appointments. If you have questions, please call your primary care clinic.  If you do not have a primary care provider, please call [phone] and they will assist you.        Care EveryWhere ID     This is your Care EveryWhere ID. This could be used by other organizations to access your [place] medical records  [id number]        Your Vitals Were     Pulse Temperature Respirations Pulse Oximetry          112 98  F (36.7  C) 18 97%         Blood Pressure from Last 3 Encounters:   02/08/18 99/60   02/05/18 115/61   02/01/18 105/70    Weight from Last 3 Encounters:   02/01/18 86.9 kg (191 lb 8 oz)   01/29/18 81.1 kg (178 lb 14.4 oz)   01/24/18 86.3 kg (190 lb 4.8 oz)              We Performed the Following     CBC with platelets differential          Today's Medication Changes          These changes are accurate as of 2/8/18 10:43 AM.  If you have any questions, ask your nurse or doctor.               These medicines have changed or have updated prescriptions.        Dose/Directions    calcitRIOL 0.25 MCG capsule   Commonly known as:  ROCALTROL   This may have changed:    - how much to take  - how to take this  - when to take this  - additional instructions   Used for:  Postsurgical hypothyroidism, Papillary carcinoma, follicular variant (H), Metastasis to cervical lymph node (H)        TAKE 2 CAPSULES BY MOUTH EVERY MORNING AND TAKE 1 CAPSULE BY MOUTH EVERY NIGHT AT BEDTIME   Quantity:  90 capsule   Refills:  0       cyclobenzaprine 10 MG tablet   Commonly known as:  FLEXERIL   This may have changed:  when to take this   Used for:  High grade B-cell lymphoma (H)        Dose:  10 mg   Take 1 tablet (10 mg) by mouth 3 times daily as needed   Quantity:  30 tablet   Refills:  0       levothyroxine 112 MCG tablet   Commonly known as:  SYNTHROID/LEVOTHROID   This may have changed:    - how much to take  - additional instructions   Used for:   Postsurgical hypothyroidism, Papillary carcinoma, follicular variant (H), Postsurgical hypoparathyroidism (H), Thyroid cancer (H)        Mon-Sat: (2 tabs daily) Sunday: 3 tabs   Quantity:  189 tablet   Refills:  3       ondansetron 8 MG ODT tab   Commonly known as:  ZOFRAN-ODT   This may have changed:  when to take this   Used for:  High grade B-cell lymphoma (H), Nausea and vomiting, intractability of vomiting not specified, unspecified vomiting type        Dose:  8 mg   Take 1 tablet (8 mg) by mouth every 8 hours as needed   Quantity:  30 tablet   Refills:  1       oxyCODONE IR 30 MG tablet   Commonly known as:  ROXICODONE   This may have changed:  when to take this   Used for:  High grade B-cell lymphoma (H)        Dose:  30 mg   Take 1 tablet (30 mg) by mouth every 4 hours as needed for moderate to severe pain   Quantity:  30 tablet   Refills:  0                Primary Care Provider Office Phone # Fax #    Shahla Raul Crawley [phone]       [address] [place] [address]        Equal Access to Services     [facility] Center[facility]IN : Hadii sacha ku hadasho Soomaali, waaxda luqadaha, qaybta kaalmada adeegyajosseline, ranjan martin. So [facility] [phone].    ATENCIÓN: Si habla español, tiene a stilse disposición servicios gratuitos de asistencia lingüística. [facility] [phone].    We comply with applicable federal civil rights laws and [place] laws. We do not discriminate on the basis of race, color, national origin, age, disability, sex, sexual orientation, or gender identity.            Thank you!     Thank you for choosing [facility]  for your care. Our goal is always to provide you with excellent care. Hearing back from our patients is one way we can continue to improve our services. Please take a few minutes to complete the written survey that you may receive in the mail after your visit with us. Thank you!             Your Updated Medication List  - Protect others around you: Learn how to safely use, store and throw away your medicines at www.disposemymeds.org.          This list is accurate as of 2/8/18 10:43 AM.  Always use your most recent med list.                   Brand Name Dispense Instructions for use Diagnosis    acetaminophen 325 MG tablet    TYLENOL    100 tablet    Take 2 tablets (650 mg) by mouth every 4 hours as needed for mild pain or fever    High grade B-cell lymphoma (H)       acyclovir 400 MG tablet    ZOVIRAX    60 tablet    Take 1 tablet (400 mg) by mouth 2 times daily    High grade B-cell lymphoma (H)       aluminum chloride 20 % external solution    DRYSOL    60 mL    Apply topically At Bedtime    Rash, Intertrigo       AZMACORT IN      Inhale 2 puffs into the lungs as needed        bisacodyl 5 MG EC tablet     30 tablet    Take 3 tablets (15 mg) by mouth daily as needed for constipation    Constipation, unspecified constipation type       calcitRIOL 0.25 MCG capsule    ROCALTROL    90 capsule    TAKE 2 CAPSULES BY MOUTH EVERY MORNING AND TAKE 1 CAPSULE BY MOUTH EVERY NIGHT AT BEDTIME    Postsurgical hypothyroidism, Papillary carcinoma, follicular variant (H), Metastasis to cervical lymph node (H)       calcium citrate-vitamin D 315-250 MG-UNIT Tabs per tablet    CALCIUM CITRATE +D    20 tablet    Take 2 tablets by mouth twice a week    High grade B-cell lymphoma (H), Hypocalcemia       celecoxib 200 MG capsule    celeBREX    56 capsule    Take 1 capsule (200 mg) by mouth 2 times daily    Chest wall pain       cetirizine 10 MG tablet    ZYRTEC ALLERGY    90 tablet    Take 1 tablet (10 mg) by mouth 3 times daily On hold for lab test.    High grade B-cell lymphoma (H)       COMPOUND CONTAINING CONTROLLED SUBSTANCE - PHARMACY TO MIX COMPOUNDED MEDICATION    CMPD RX    120 g    Apply small amount to affected area two times daily.    Neoplasm related pain       cromolyn 4 % ophthalmic solution    OPTICROM    10 mL    Place 1 drop into both  eyes 4 times daily    Idiopathic mast cell activation syndrome (H)       cromolyn sodium 5.2 MG/ACT Aers Inhaler    NASALCROM    1 Bottle    SPRAY ONE SPRAY( 1 ML) IN NOSTRIL DAILY    Mast cell disease, systemic       [employer] GUMMY [employer]ILDREN Chew     120 tablet    Take 5 g by mouth daily (2 gummy= 5 g =1 serving)    High grade B-cell lymphoma (H)       cyclobenzaprine 10 MG tablet    FLEXERIL    30 tablet    Take 1 tablet (10 mg) by mouth 3 times daily as needed    High grade B-cell lymphoma (H)       dexamethasone 4 MG tablet    DECADRON    4 tablet    Take 2 tablets (8 mg) by mouth daily for 2 days Please take on 1/23 and 1/24.    High grade B-cell lymphoma (H)       diazepam 5 MG tablet    VALIUM    90 tablet    TAKE ONE TABLET BY MOUTH THREE TIMES DAILY AS NEEDED FOR MUSCLE SPASMS    Chest wall pain       diclofenac 1 % Gel topical gel    VOLTAREN    100 g    Apply affected area two times daily PRN using enclosed dosing card.    Myofascial pain       EPINEPHrine 0.3 MG/0.3ML injection 2-pack    EPIPEN 2-CARIDAD    0.6 mL    Inject 0.3 mLs (0.3 mg) into the muscle once as needed for anaphylaxis        fluconazole 200 MG tablet    DIFLUCAN    30 tablet    Take 1 tablet (200 mg) by mouth daily    High grade B-cell lymphoma (H)       furosemide 20 MG tablet    LASIX    60 tablet    Take 1 tablet (20 mg) by mouth 2 times daily    Localized edema       hydrochlorothiazide 12.5 MG capsule    MICROZIDE    30 capsule    Take 1 capsule (12.5 mg) by mouth daily    Hypocalcemia       levofloxacin 250 MG tablet    LEVAQUIN    30 tablet    Take 1 tablet (250 mg) by mouth daily    High grade B-cell lymphoma (H)       levothyroxine 112 MCG tablet    SYNTHROID/LEVOTHROID    189 tablet    Mon-Sat: (2 tabs daily) Sunday: 3 tabs    Postsurgical hypothyroidism, Papillary carcinoma, follicular variant (H), Postsurgical hypoparathyroidism (H), Thyroid cancer (H)       lidocaine 5 % ointment    XYLOCAINE    350 g    Apply quarter  size amount to chest and back up to 3 times daily as needed for pain.    Chest wall pain       lidocaine visc 2% 2.5mL/5mL & maalox/mylanta w/ simeth 2.5mL/5mL & diphenhydrAMINE 5mg/5mL Susp suspension    [facility]    360 mL    Swish and spit 10 mLs in mouth every 6 hours as needed for mouth sores    Stomatitis and mucositis, High grade B-cell lymphoma (H)       lidocaine-prilocaine cream    EMLA    30 g    Apply topically as needed (port access pain.)    Neoplasm related pain, Chest wall pain       methocarbamol 750 MG tablet    ROBAXIN     Take 1 tablet (750 mg) by mouth 4 times daily . Ok to take a 5th Robaxin on very severe days.    Myofascial pain       montelukast 10 MG tablet    SINGULAIR    60 tablet    Take 2 tablets (20 mg) by mouth 2 times daily    Idiopathic mast cell activation syndrome (H)       morphine 30 MG 12 hr tablet    MS CONTIN    120 tablet    Take 1 tablet (30 mg) by mouth every 8 hours . Take an addition pill at night such that your evening dose is 60mg    Neoplasm related pain       ondansetron 8 MG ODT tab    ZOFRAN-ODT    30 tablet    Take 1 tablet (8 mg) by mouth every 8 hours as needed    High grade B-cell lymphoma (H), Nausea and vomiting, intractability of vomiting not specified, unspecified vomiting type       * order for DME     2 Units    Equipment being ordered: compression stockings. 20-30 mm or 30 - 40 mm as patient can tolerate. Physical therapy to determine if they should be above or below the knee.    Venous stasis       * order for DME     2 Device    Equipment being ordered: compression bra    Malignant neoplasm of right female breast, unspecified site of breast       * order for DME     1 Units    Compression stockings, medium grade, please measure and fit. Please dispense a pair.    Peripheral edema       oxyCODONE IR 30 MG tablet    ROXICODONE    30 tablet    Take 1 tablet (30 mg) by mouth every 4 hours as needed for moderate to severe pain    High grade  B-cell lymphoma (H)       polyethylene glycol powder    MIRALAX    510 g    Take 17 g (1 capful) by mouth daily    Acute constipation       potassium chloride SA 20 MEQ CR tablet    KLOR-CON    30 tablet    Take 1 tablet (20 mEq) by mouth 2 times daily    Hypokalemia       PROBIOTIC DAILY PO      Take 1 capsule by mouth daily Lacto acid bifidobacterium    Breast cancer, unspecified laterality, Thyroid cancer (H), Chronic arthralgias of knees and hips, unspecified laterality       prochlorperazine 10 MG tablet    COMPAZINE     Take 10 mg by mouth as needed        ranitidine 75 MG tablet    ZANTAC    90 tablet    Take 1 tablet (75 mg) by mouth 3 times daily    Mast cell disease, systemic       senna-docusate 8.6-50 MG per tablet    SENOKOT-S;PERICOLACE    60 tablet    Take 1-2 tablets by mouth 2 times daily as needed for constipation    Acute constipation       SUMAtriptan 50 MG tablet    IMITREX     Take 50 mg by mouth as needed        triamcinolone 0.025 % ointment    KENALOG     Apply topically as needed        * UNABLE TO FIND      3 tablets 3 times daily MEDICATION NAME: calcium D-Glucarate  3 caps contain 180mg of elemental calcium.        * UNABLE TO FIND      Take 2 capsules by mouth 3 times daily Muscle Mag. 2 caps contain B1 20mg, B2 20mg, B6 10mg, magesium 20mg, manganese 2mg.        * UNABLE TO FIND      2 tablets 3 times daily MEDICATION NAME: Digestzymes    Thyroid cancer (H), Postsurgical hypothyroidism, Postsurgical hypoparathyroidism (H)       * UNABLE TO FIND      1 tablet daily MEDICATION NAME: Pure Encapsulations    Thyroid cancer (H), Postsurgical hypothyroidism, Postsurgical hypoparathyroidism (H)       VENTOLIN  (90 BASE) MCG/ACT Inhaler   Generic drug:  albuterol     18 g    INHALE 2 PUFFS INTO THE LUNGS EVERY 4 HOURS AS NEEDED FOR SHORTNESS OF BREATH OR DIFFICULT BREATHING OR WHEEZING    Mild intermittent asthma without complication       vitamin D3 2000 UNITS Caps     60 capsule     Take 5,000 Units by mouth daily Takes 2 tabs daily    Thyroid cancer (H), Postsurgical hypothyroidism, Papillary carcinoma, follicular variant (H), Postsurgical hypoparathyroidism (H)       * Notice:  This list has 7 medication(s) that are the same as other medications prescribed for you. Read the directions carefully, and ask your doctor or other care provider to review them with you.

## 2018-02-08 NOTE — PROGRESS NOTES
Infusion Nursing Note:  Tisha Arias presents today for blood draw, possible transfusion.    Patient seen by provider today: No   present during visit today: Not Applicable.    Note: Patient states she is very tired, but denies shortness of breath, dizziness.    Intravenous Access:  Labs drawn without difficulty.  Implanted Port.    Treatment Conditions:  Lab Results   Component Value Date    HGB 9.6 02/08/2018     Lab Results   Component Value Date    WBC 3.8 02/08/2018      Lab Results   Component Value Date    ANEU 2.6 02/08/2018     Lab Results   Component Value Date     02/08/2018      Results reviewed, labs did NOT meet treatment parameters: for transfusion.  Blood transfusion consent signed 10/16/2017    Post Infusion Assessment:  Blood return noted pre and post infusion.  Site patent and intact, free from redness, edema or discomfort.  No evidence of extravasations.  Access discontinued per protocol.    Discharge Plan:   Patient discharged in stable condition accompanied by: self.  Departure Mode: Ambulatory.  Will  Return to clinic next week.  Paola Reynolds RN

## 2018-02-09 ENCOUNTER — CARE COORDINATION (OUTPATIENT)
Dept: ONCOLOGY | Facility: CLINIC | Age: 58
End: 2018-02-09

## 2018-02-09 DIAGNOSIS — R60.0 LOWER EXTREMITY EDEMA: ICD-10-CM

## 2018-02-09 DIAGNOSIS — C83.30 DIFFUSE LARGE B CELL LYMPHOMA (H): Primary | ICD-10-CM

## 2018-02-09 NOTE — PROGRESS NOTES
Selina's recommendations today:  Concerned about fall risk even aside from her low BP. I wouldn't be comfortable with more than 3 days of lasix 20 mg with BP parameters (hold if SBP<100).   Would like her to see lymphedema therapy and get sized for new compression stockings  Asked Elvin to call me back to confirm above information and to discuss referral to PT for lymphedema therapy, asked if she has seen someone in past (no info in our system) or if we can refer her to our therapist here  Notified pt of above and she verbalized understanding. Would like to see lymphedema therapist she saw in 2012 if possible, if not available would be willing to see someone new here in [facility].  She understands we will work on this next week

## 2018-02-09 NOTE — PROGRESS NOTES
Received message that pt called back, spoke with pt re: sx and she reports the area of irritation on her labia is resolved and she is not having dysuria and does not feel UA needs to be checked. She asked for help with her LE edema. She said it has gotten worse this week, cannot even get her compression stockings on and has gotten shoes 1.5 size too big and they don't fit now. She'd like to try Lasix 20 mg for a few days.   When I questioned her, she told me she has not been on the HCTZ though I see in Selina's note that she intended her to be back on it.   I advised her to resume it, and let her know I would check with provider re: whether she also take any Lasix. We discussed that she's had falls and that this is tricky, as her BPs have been on the low side this week. 99/60 yesterday at lab draw   Pt voiced understanding of above instructions and information and denied further questions

## 2018-02-12 ENCOUNTER — MYC MEDICAL ADVICE (OUTPATIENT)
Dept: TRANSPLANT | Facility: CLINIC | Age: 58
End: 2018-02-12

## 2018-02-13 ENCOUNTER — CARE COORDINATION (OUTPATIENT)
Dept: ONCOLOGY | Facility: CLINIC | Age: 58
End: 2018-02-13

## 2018-02-13 DIAGNOSIS — C83.30 DIFFUSE LARGE B CELL LYMPHOMA (H): Primary | ICD-10-CM

## 2018-02-13 DIAGNOSIS — R60.0 BILATERAL LOWER EXTREMITY EDEMA: ICD-10-CM

## 2018-02-13 RX ORDER — FUROSEMIDE 20 MG
20 TABLET ORAL 2 TIMES DAILY
Qty: 6 TABLET | Refills: 0 | Status: SHIPPED | OUTPATIENT
Start: 2018-02-13 | End: 2018-02-16

## 2018-02-13 NOTE — TELEPHONE ENCOUNTER
RN Care Coordination Note  Reason for Outgoing Call:   See [employer] message from pt, need more info  Patient Questions/Concerns:   See MyChart message. Pt reports on phone that she took Lasix 20 mg qd x 3days and is now out of it and wants to continue to take it. No dizziness or falls. Is at work and was limited in what she was willing to discuss with me during the phone call but understands that we will likely need her to come in for clinic visit to evaluate sx  Nursing Action/Patient Instruction:  Reviewed above with COTY Brooks  TORB:  COTY Chadwick / Laila Barrett, RN:  Lasix 20 mg bid x 3 days  Check CBC, BMP later this week with provider visit to reassess sx  Pt to call if sx worsen or do not improve in the interim  Lymphedema consult needed, in progress  - Left message for pt re: above , requested she call back to arrange lab/SANJANA appt later this week.  - lasix eprescribed to pt's preferred pharmacy  See [employer] message to pt re: need for ECHO and additional labs per DR. Sauceda.      Laila Barrett, RN, BSN, OCN  Care Coordinator  [facility]      2/15/18 Addendum: noted that pt has not returned my call, returned scheduling call, or read my [employer] message or scheduling's message re: tomorrow's appts.  Left detailed messages on work and cell numbers re: importance of ECHO, LAbs and SANJANA visit as scheduled tomorrow due to her sx report this week.

## 2018-02-16 ENCOUNTER — RADIANT APPOINTMENT (OUTPATIENT)
Dept: CARDIOLOGY | Facility: CLINIC | Age: 58
End: 2018-02-16
Payer: COMMERCIAL

## 2018-02-16 ENCOUNTER — APPOINTMENT (OUTPATIENT)
Dept: LAB | Facility: CLINIC | Age: 58
End: 2018-02-16
Payer: COMMERCIAL

## 2018-02-16 ENCOUNTER — RECORDS - HEALTHEAST (OUTPATIENT)
Dept: ADMINISTRATIVE | Facility: OTHER | Age: 58
End: 2018-02-16

## 2018-02-16 ENCOUNTER — ONCOLOGY VISIT (OUTPATIENT)
Dept: ONCOLOGY | Facility: CLINIC | Age: 58
End: 2018-02-16
Attending: PHYSICIAN ASSISTANT
Payer: COMMERCIAL

## 2018-02-16 VITALS
TEMPERATURE: 98.4 F | SYSTOLIC BLOOD PRESSURE: 109 MMHG | OXYGEN SATURATION: 94 % | HEART RATE: 78 BPM | DIASTOLIC BLOOD PRESSURE: 72 MMHG

## 2018-02-16 DIAGNOSIS — C83.30 DIFFUSE LARGE B CELL LYMPHOMA (H): ICD-10-CM

## 2018-02-16 DIAGNOSIS — C85.10 HIGH GRADE B-CELL LYMPHOMA (H): ICD-10-CM

## 2018-02-16 DIAGNOSIS — C83.30 DIFFUSE LARGE B-CELL LYMPHOMA, UNSPECIFIED BODY REGION (H): Primary | ICD-10-CM

## 2018-02-16 DIAGNOSIS — R60.0 BILATERAL LOWER EXTREMITY EDEMA: ICD-10-CM

## 2018-02-16 DIAGNOSIS — R60.9 EDEMA, UNSPECIFIED TYPE: ICD-10-CM

## 2018-02-16 DIAGNOSIS — E87.6 HYPOKALEMIA: ICD-10-CM

## 2018-02-16 LAB
ALBUMIN SERPL-MCNC: 3 G/DL (ref 3.4–5)
ALBUMIN UR-MCNC: NEGATIVE MG/DL
ALP SERPL-CCNC: 70 U/L (ref 40–150)
ALT SERPL W P-5'-P-CCNC: 17 U/L (ref 0–50)
ANION GAP SERPL CALCULATED.3IONS-SCNC: 6 MMOL/L (ref 3–14)
ANISOCYTOSIS BLD QL SMEAR: SLIGHT
APPEARANCE UR: CLEAR
AST SERPL W P-5'-P-CCNC: 19 U/L (ref 0–45)
BASOPHILS # BLD AUTO: 0 10E9/L (ref 0–0.2)
BASOPHILS NFR BLD AUTO: 1.2 %
BILIRUB SERPL-MCNC: 0.3 MG/DL (ref 0.2–1.3)
BILIRUB UR QL STRIP: NEGATIVE
BUN SERPL-MCNC: 12 MG/DL (ref 7–30)
CALCIUM SERPL-MCNC: 8.4 MG/DL (ref 8.5–10.1)
CHLORIDE SERPL-SCNC: 105 MMOL/L (ref 94–109)
CO2 SERPL-SCNC: 30 MMOL/L (ref 20–32)
COLOR UR AUTO: YELLOW
CREAT SERPL-MCNC: 0.86 MG/DL (ref 0.52–1.04)
CREAT UR-MCNC: 80 MG/DL
CRP SERPL-MCNC: <2.9 MG/L (ref 0–8)
DIFFERENTIAL METHOD BLD: ABNORMAL
EOSINOPHIL # BLD AUTO: 0.1 10E9/L (ref 0–0.7)
EOSINOPHIL NFR BLD AUTO: 1.9 %
ERYTHROCYTE [DISTWIDTH] IN BLOOD BY AUTOMATED COUNT: 18.6 % (ref 10–15)
GFR SERPL CREATININE-BSD FRML MDRD: 68 ML/MIN/1.7M2
GLUCOSE SERPL-MCNC: 137 MG/DL (ref 70–99)
GLUCOSE UR STRIP-MCNC: NEGATIVE MG/DL
HCT VFR BLD AUTO: 31.2 % (ref 35–47)
HGB BLD-MCNC: 10 G/DL (ref 11.7–15.7)
HGB UR QL STRIP: NEGATIVE
IMM GRANULOCYTES # BLD: 0 10E9/L (ref 0–0.4)
IMM GRANULOCYTES NFR BLD: 0.3 %
KETONES UR STRIP-MCNC: NEGATIVE MG/DL
LEUKOCYTE ESTERASE UR QL STRIP: ABNORMAL
LYMPHOCYTES # BLD AUTO: 0.4 10E9/L (ref 0.8–5.3)
LYMPHOCYTES NFR BLD AUTO: 12.4 %
MACROCYTES BLD QL SMEAR: PRESENT
MCH RBC QN AUTO: 33.3 PG (ref 26.5–33)
MCHC RBC AUTO-ENTMCNC: 32.1 G/DL (ref 31.5–36.5)
MCV RBC AUTO: 104 FL (ref 78–100)
MONOCYTES # BLD AUTO: 0.7 10E9/L (ref 0–1.3)
MONOCYTES NFR BLD AUTO: 22.7 %
MUCOUS THREADS #/AREA URNS LPF: PRESENT /LPF
NEUTROPHILS # BLD AUTO: 2 10E9/L (ref 1.6–8.3)
NEUTROPHILS NFR BLD AUTO: 61.5 %
NITRATE UR QL: NEGATIVE
NRBC # BLD AUTO: 0 10*3/UL
NRBC BLD AUTO-RTO: 0 /100
PH UR STRIP: 6 PH (ref 5–7)
PLATELET # BLD AUTO: 166 10E9/L (ref 150–450)
PLATELET # BLD EST: ABNORMAL 10*3/UL
POIKILOCYTOSIS BLD QL SMEAR: SLIGHT
POTASSIUM SERPL-SCNC: 3.6 MMOL/L (ref 3.4–5.3)
PROT SERPL-MCNC: 5.8 G/DL (ref 6.8–8.8)
PROT UR-MCNC: 0.09 G/L
PROT/CREAT 24H UR: 0.12 G/G CR (ref 0–0.2)
RBC # BLD AUTO: 3 10E12/L (ref 3.8–5.2)
RBC #/AREA URNS AUTO: <1 /HPF (ref 0–2)
SODIUM SERPL-SCNC: 141 MMOL/L (ref 133–144)
SOURCE: ABNORMAL
SP GR UR STRIP: 1.01 (ref 1–1.03)
T4 FREE SERPL-MCNC: 1.14 NG/DL (ref 0.76–1.46)
TSH SERPL DL<=0.005 MIU/L-ACNC: 0.01 MU/L (ref 0.4–4)
UROBILINOGEN UR STRIP-MCNC: 4 MG/DL (ref 0–2)
WBC # BLD AUTO: 3.2 10E9/L (ref 4–11)
WBC #/AREA URNS AUTO: 1 /HPF (ref 0–2)

## 2018-02-16 PROCEDURE — 84156 ASSAY OF PROTEIN URINE: CPT | Performed by: PHYSICIAN ASSISTANT

## 2018-02-16 PROCEDURE — 84439 ASSAY OF FREE THYROXINE: CPT | Performed by: PHYSICIAN ASSISTANT

## 2018-02-16 PROCEDURE — 99214 OFFICE O/P EST MOD 30 MIN: CPT | Mod: ZP | Performed by: PHYSICIAN ASSISTANT

## 2018-02-16 PROCEDURE — 36591 DRAW BLOOD OFF VENOUS DEVICE: CPT

## 2018-02-16 PROCEDURE — 81001 URINALYSIS AUTO W/SCOPE: CPT | Performed by: PHYSICIAN ASSISTANT

## 2018-02-16 PROCEDURE — 86140 C-REACTIVE PROTEIN: CPT | Performed by: PHYSICIAN ASSISTANT

## 2018-02-16 PROCEDURE — 80053 COMPREHEN METABOLIC PANEL: CPT | Performed by: PHYSICIAN ASSISTANT

## 2018-02-16 PROCEDURE — 85025 COMPLETE CBC W/AUTO DIFF WBC: CPT | Performed by: PHYSICIAN ASSISTANT

## 2018-02-16 PROCEDURE — 25000128 H RX IP 250 OP 636: Mod: ZF | Performed by: PHYSICIAN ASSISTANT

## 2018-02-16 PROCEDURE — 84443 ASSAY THYROID STIM HORMONE: CPT | Performed by: PHYSICIAN ASSISTANT

## 2018-02-16 PROCEDURE — G0463 HOSPITAL OUTPT CLINIC VISIT: HCPCS | Mod: ZF

## 2018-02-16 RX ORDER — FUROSEMIDE 20 MG
TABLET ORAL
Qty: 30 TABLET | Refills: 0 | Status: SHIPPED | OUTPATIENT
Start: 2018-02-16 | End: 2018-02-23

## 2018-02-16 RX ORDER — HEPARIN SODIUM (PORCINE) LOCK FLUSH IV SOLN 100 UNIT/ML 100 UNIT/ML
5 SOLUTION INTRAVENOUS ONCE
Status: COMPLETED | OUTPATIENT
Start: 2018-02-16 | End: 2018-02-16

## 2018-02-16 RX ORDER — POTASSIUM CHLORIDE 1500 MG/1
20 TABLET, EXTENDED RELEASE ORAL 2 TIMES DAILY
Qty: 30 TABLET | Refills: 0 | Status: SHIPPED | OUTPATIENT
Start: 2018-02-16 | End: 2018-03-16

## 2018-02-16 RX ORDER — ACYCLOVIR 400 MG/1
400 TABLET ORAL 2 TIMES DAILY
Qty: 60 TABLET | Refills: 0 | Status: SHIPPED | OUTPATIENT
Start: 2018-02-16 | End: 2018-03-20

## 2018-02-16 RX ADMIN — SODIUM CHLORIDE, PRESERVATIVE FREE 5 ML: 5 INJECTION INTRAVENOUS at 15:20

## 2018-02-16 RX ADMIN — Medication 6 ML: at 14:15

## 2018-02-16 ASSESSMENT — PAIN SCALES - GENERAL: PAINLEVEL: NO PAIN (0)

## 2018-02-16 NOTE — MR AVS SNAPSHOT
After Visit Summary   2/16/2018    Tisha Arias    MRN: [id number]           Patient Information     Date Of Birth          1960        Visit Information        Provider Department      2/16/2018 3:40 PM Selina Elizondo PA M [facility]        Today's Diagnoses     Diffuse large B-cell lymphoma, unspecified body region (H)    -  1    Edema, unspecified type        High grade B-cell lymphoma (H)        Diffuse large B cell lymphoma (H)        Bilateral lower extremity edema        Hypokalemia          Care Instructions    Stop allopurinol, fluconazole and levaquin. Continue Acyclovir twice daily.  Take 20 mg of lasix twice daily for 3 days, then once daily. On the days you are taking lasix twice daily, take potassium twice daily. Then take potassium once daily.            Follow-ups after your visit        Your next 10 appointments already scheduled     Feb 27, 2018  6:45 AM CST   (Arrive by 6:30 AM)   PET ONCOLOGY WHOLE BODY with UUPET1   [facility] [place] PET CT ([facility], [facility])    [address] [facility] [address] [facility] [address] [phone]           Tell your doctor:   If there is any chance you may be pregnant or if you are breastfeeding.   If you have problems lying in small spaces (claustrophobia). If you do, your doctor may give you medicine to help you relax. If you have diabetes:   Have your exam early in the morning. Your blood glucose will go up as the day goes by.   Your glucose level must be 180 or less at the start of the exam. Please take any medicines you need to ensure this blood glucose level.   If you are taking insulin in the morning take with breakfast by 6 am and schedule procedure between 12 and 2:15 pm.   If you are taking insulin at night take nightly dose, fast overnight, schedule procedure before 10 am.   If you take insulin both morning and night take morning dose by 6am and schedule procedure between  12 and 2:15 pm.   24 hours before your scan: Don t do any heavy exercise. (No jogging, aerobics or other workouts.) Exercise will make your pictures less accurate.  7 hours before your scan: Eat a low carb, high protein meal (Lean meats, seafood, beans, soy, low-fat dairy, eggs, nuts & seeds). 6 hours before your scan:   Stop all food and liquids (except water).   Do not chew gum or suck on mints.   If you need to take medicine with food, you may take it with a few crackers.  Please call your Imaging Department at your exam site with any questions.            Feb 28, 2018  9:30 AM CST   (Arrive by 9:15 AM)   Return Visit with Edu Benitez MD   [facility] ([facility])    [address] [place] [address]  [address]  [facility] [id number] [phone]            Feb 28, 2018  3:30 PM CST   RETURN ONC with Garima Sauceda MD   [facility] Blood and Marrow Transplant ([facility])    [address] [place] [address]  [address]  [facility] [id number] [phone]            May 21, 2018  4:20 PM CDT   (Arrive by 4:05 PM)   RETURN ENDOCRINE with Avelina Castro MD   [facility] Endocrinology ([facility])    [address] [place] [address]  3rd Floor  [facility] [id number] [phone]            Jun 11, 2018  4:00 PM CDT   (Arrive by 3:45 PM)   Return Visit with Shahla Crawley MD   [facility] ([facility])    [address] [place] [address]  [address]  [facility] [id number] [phone]              Who to contact     If you have questions or need follow up information about today's clinic visit or your schedule please contact [facility] directly at [phone].  Normal or non-critical lab and imaging results will be communicated to you by MyChart, letter or phone within 4 business days after the clinic has received the results. If you do not hear from us within 7 days,  please contact the clinic through [employer] or phone. If you have a critical or abnormal lab result, we will notify you by phone as soon as possible.  Submit refill requests through [employer] or call your pharmacy and they will forward the refill request to us. Please allow 3 business days for your refill to be completed.          Additional Information About Your Visit        [employer] Information     [employer] gives you secure access to your electronic health record. If you see a primary care provider, you can also send messages to your care team and make appointments. If you have questions, please call your primary care clinic.  If you do not have a primary care provider, please call [phone] and they will assist you.        Care EveryWhere ID     This is your Care EveryWhere ID. This could be used by other organizations to access your [facility] medical records  [id number]        Your Vitals Were     Pulse Temperature Pulse Oximetry             78 98.4  F (36.9  C) (Oral) 94%          Blood Pressure from Last 3 Encounters:   02/16/18 109/72   02/08/18 99/60   02/05/18 115/61    Weight from Last 3 Encounters:   02/01/18 86.9 kg (191 lb 8 oz)   01/29/18 81.1 kg (178 lb 14.4 oz)   01/24/18 86.3 kg (190 lb 4.8 oz)              We Performed the Following     CBC with platelets differential     Comprehensive metabolic panel     CRP inflammation     Protein  random urine with Creat Ratio     Routine UA with micro reflex to culture     T4 free     TSH with free T4 reflex          Today's Medication Changes          These changes are accurate as of 2/16/18  5:42 PM.  If you have any questions, ask your nurse or doctor.               These medicines have changed or have updated prescriptions.        Dose/Directions    calcitRIOL 0.25 MCG capsule   Commonly known as:  ROCALTROL   This may have changed:    - how much to take  - how to take this  - when to take this  - additional instructions   Used for:  Postsurgical  hypothyroidism, Papillary carcinoma, follicular variant (H), Metastasis to cervical lymph node (H)        TAKE 2 CAPSULES BY MOUTH EVERY MORNING AND TAKE 1 CAPSULE BY MOUTH EVERY NIGHT AT BEDTIME   Quantity:  90 capsule   Refills:  0       cyclobenzaprine 10 MG tablet   Commonly known as:  FLEXERIL   This may have changed:  when to take this   Used for:  High grade B-cell lymphoma (H)        Dose:  10 mg   Take 1 tablet (10 mg) by mouth 3 times daily as needed   Quantity:  30 tablet   Refills:  0       * furosemide 20 MG tablet   Commonly known as:  LASIX   This may have changed:  Another medication with the same name was changed. Make sure you understand how and when to take each.   Used for:  Localized edema   Changed by:  Selina Elizondo PA        Dose:  20 mg   Take 1 tablet (20 mg) by mouth 2 times daily   Quantity:  60 tablet   Refills:  0       * furosemide 20 MG tablet   Commonly known as:  LASIX   This may have changed:    - how much to take  - how to take this  - when to take this  - additional instructions   Used for:  Bilateral lower extremity edema   Changed by:  Selina Elizondo PA        Take 20 mg (1 tablet) twice a day for 3 days, then take 1 tablet daily   Quantity:  30 tablet   Refills:  0       levothyroxine 112 MCG tablet   Commonly known as:  SYNTHROID/LEVOTHROID   This may have changed:    - how much to take  - additional instructions   Used for:  Postsurgical hypothyroidism, Papillary carcinoma, follicular variant (H), Postsurgical hypoparathyroidism (H), Thyroid cancer (H)        Mon-Sat: (2 tabs daily) Sunday: 3 tabs   Quantity:  189 tablet   Refills:  3       ondansetron 8 MG ODT tab   Commonly known as:  ZOFRAN-ODT   This may have changed:  when to take this   Used for:  High grade B-cell lymphoma (H), Nausea and vomiting, intractability of vomiting not specified, unspecified vomiting type        Dose:  8 mg   Take 1 tablet (8 mg) by mouth every 8 hours as needed   Quantity:  30  tablet   Refills:  1       oxyCODONE IR 30 MG tablet   Commonly known as:  ROXICODONE   This may have changed:  when to take this   Used for:  High grade B-cell lymphoma (H)        Dose:  30 mg   Take 1 tablet (30 mg) by mouth every 4 hours as needed for moderate to severe pain   Quantity:  30 tablet   Refills:  0       * Notice:  This list has 2 medication(s) that are the same as other medications prescribed for you. Read the directions carefully, and ask your doctor or other care provider to review them with you.      Stop taking these medicines if you haven't already. Please contact your care team if you have questions.     dexamethasone 4 MG tablet   Commonly known as:  DECADRON   Stopped by:  Selina Elizondo PA           fluconazole 200 MG tablet   Commonly known as:  DIFLUCAN   Stopped by:  Selina Elizondo PA           levofloxacin 250 MG tablet   Commonly known as:  LEVAQUIN   Stopped by:  Selina Elizondo PA                Where to get your medicines      These medications were sent to [id number] - [place], [place] - [address] [address] [facility] [address]. [place]  [address] [address]. [place] [address]     Phone:  [phone]     acyclovir 400 MG tablet    furosemide 20 MG tablet    potassium chloride SA 20 MEQ CR tablet                Primary Care Provider Office Phone # Fax #    Shahla Crawley [phone]       [address] [place] [address]        Equal Access to Services     [facility]: Hadii aad ku hadasho Soomaali, waaxda luqadaha, qaybta kaalmada adeegyada, ranjan martin. So [facility] [phone].    ATENCIÓN: Si habla español, tiene a stiles disposición servicios gratuitos de asistencia lingüística. Oneil al [phone].    We comply with applicable federal civil rights laws and [place] laws. We do not discriminate on the basis of race, color, national origin, age, disability, sex, sexual orientation, or gender identity.            Thank you!      Thank you for choosing [facility]  for your care. Our goal is always to provide you with excellent care. Hearing back from our patients is one way we can continue to improve our services. Please take a few minutes to complete the written survey that you may receive in the mail after your visit with us. Thank you!             Your Updated Medication List - Protect others around you: Learn how to safely use, store and throw away your medicines at www.disposemymeds.org.          This list is accurate as of 2/16/18  5:42 PM.  Always use your most recent med list.                   Brand Name Dispense Instructions for use Diagnosis    acetaminophen 325 MG tablet    TYLENOL    100 tablet    Take 2 tablets (650 mg) by mouth every 4 hours as needed for mild pain or fever    High grade B-cell lymphoma (H)       acyclovir 400 MG tablet    ZOVIRAX    60 tablet    Take 1 tablet (400 mg) by mouth 2 times daily    High grade B-cell lymphoma (H)       aluminum chloride 20 % external solution    DRYSOL    60 mL    Apply topically At Bedtime    Rash, Intertrigo       AZMACORT IN      Inhale 2 puffs into the lungs as needed        bisacodyl 5 MG EC tablet     30 tablet    Take 3 tablets (15 mg) by mouth daily as needed for constipation    Constipation, unspecified constipation type       calcitRIOL 0.25 MCG capsule    ROCALTROL    90 capsule    TAKE 2 CAPSULES BY MOUTH EVERY MORNING AND TAKE 1 CAPSULE BY MOUTH EVERY NIGHT AT BEDTIME    Postsurgical hypothyroidism, Papillary carcinoma, follicular variant (H), Metastasis to cervical lymph node (H)       calcium citrate-vitamin D 315-250 MG-UNIT Tabs per tablet    CALCIUM CITRATE +D    20 tablet    Take 2 tablets by mouth twice a week    High grade B-cell lymphoma (H), Hypocalcemia       celecoxib 200 MG capsule    celeBREX    56 capsule    Take 1 capsule (200 mg) by mouth 2 times daily    Chest wall pain       cetirizine 10 MG tablet    ZYRTEC ALLERGY    90 tablet     Take 1 tablet (10 mg) by mouth 3 times daily On hold for lab test.    High grade B-cell lymphoma (H)       COMPOUND CONTAINING CONTROLLED SUBSTANCE - PHARMACY TO MIX COMPOUNDED MEDICATION    CMPD RX    120 g    Apply small amount to affected area two times daily.    Neoplasm related pain       cromolyn 4 % ophthalmic solution    OPTICROM    10 mL    Place 1 drop into both eyes 4 times daily    Idiopathic mast cell activation syndrome (H)       cromolyn sodium 5.2 MG/ACT Aers Inhaler    NASALCROM    1 Bottle    SPRAY ONE SPRAY( 1 ML) IN NOSTRIL DAILY    Mast cell disease, systemic       [employer] GUMMY [employer]ILDREN Chew     120 tablet    Take 5 g by mouth daily (2 gummy= 5 g =1 serving)    High grade B-cell lymphoma (H)       cyclobenzaprine 10 MG tablet    FLEXERIL    30 tablet    Take 1 tablet (10 mg) by mouth 3 times daily as needed    High grade B-cell lymphoma (H)       diazepam 5 MG tablet    VALIUM    90 tablet    TAKE ONE TABLET BY MOUTH THREE TIMES DAILY AS NEEDED FOR MUSCLE SPASMS    Chest wall pain       diclofenac 1 % Gel topical gel    VOLTAREN    100 g    Apply affected area two times daily PRN using enclosed dosing card.    Myofascial pain       EPINEPHrine 0.3 MG/0.3ML injection 2-pack    EPIPEN 2-CARIDAD    0.6 mL    Inject 0.3 mLs (0.3 mg) into the muscle once as needed for anaphylaxis        * furosemide 20 MG tablet    LASIX    60 tablet    Take 1 tablet (20 mg) by mouth 2 times daily    Localized edema       * furosemide 20 MG tablet    LASIX    30 tablet    Take 20 mg (1 tablet) twice a day for 3 days, then take 1 tablet daily    Bilateral lower extremity edema       hydrochlorothiazide 12.5 MG capsule    MICROZIDE    30 capsule    Take 1 capsule (12.5 mg) by mouth daily    Hypocalcemia       levothyroxine 112 MCG tablet    SYNTHROID/LEVOTHROID    189 tablet    Mon-Sat: (2 tabs daily) Sunday: 3 tabs    Postsurgical hypothyroidism, Papillary carcinoma, follicular variant (H), Postsurgical  hypoparathyroidism (H), Thyroid cancer (H)       lidocaine 5 % ointment    XYLOCAINE    350 g    Apply quarter size amount to chest and back up to 3 times daily as needed for pain.    Chest wall pain       lidocaine visc 2% 2.5mL/5mL & maalox/mylanta w/ simeth 2.5mL/5mL & diphenhydrAMINE 5mg/5mL Susp suspension    [facility]    360 mL    Swish and spit 10 mLs in mouth every 6 hours as needed for mouth sores    Stomatitis and mucositis, High grade B-cell lymphoma (H)       lidocaine-prilocaine cream    EMLA    30 g    Apply topically as needed (port access pain.)    Neoplasm related pain, Chest wall pain       methocarbamol 750 MG tablet    ROBAXIN     Take 1 tablet (750 mg) by mouth 4 times daily . Ok to take a 5th Robaxin on very severe days.    Myofascial pain       montelukast 10 MG tablet    SINGULAIR    60 tablet    Take 2 tablets (20 mg) by mouth 2 times daily    Idiopathic mast cell activation syndrome (H)       morphine 30 MG 12 hr tablet    MS CONTIN    120 tablet    Take 1 tablet (30 mg) by mouth every 8 hours . Take an addition pill at night such that your evening dose is 60mg    Neoplasm related pain       ondansetron 8 MG ODT tab    ZOFRAN-ODT    30 tablet    Take 1 tablet (8 mg) by mouth every 8 hours as needed    High grade B-cell lymphoma (H), Nausea and vomiting, intractability of vomiting not specified, unspecified vomiting type       * order for DME     2 Units    Equipment being ordered: compression stockings. 20-30 mm or 30 - 40 mm as patient can tolerate. Physical therapy to determine if they should be above or below the knee.    Venous stasis       * order for DME     2 Device    Equipment being ordered: compression bra    Malignant neoplasm of right female breast, unspecified site of breast       * order for DME     1 Units    Compression stockings, medium grade, please measure and fit. Please dispense a pair.    Peripheral edema       oxyCODONE IR 30 MG tablet    ROXICODONE     30 tablet    Take 1 tablet (30 mg) by mouth every 4 hours as needed for moderate to severe pain    High grade B-cell lymphoma (H)       polyethylene glycol powder    MIRALAX    510 g    Take 17 g (1 capful) by mouth daily    Acute constipation       potassium chloride SA 20 MEQ CR tablet    KLOR-CON    30 tablet    Take 1 tablet (20 mEq) by mouth 2 times daily    Hypokalemia       PROBIOTIC DAILY PO      Take 1 capsule by mouth daily Lacto acid bifidobacterium    Breast cancer, unspecified laterality, Thyroid cancer (H), Chronic arthralgias of knees and hips, unspecified laterality       prochlorperazine 10 MG tablet    COMPAZINE     Take 10 mg by mouth as needed        ranitidine 75 MG tablet    ZANTAC    90 tablet    Take 1 tablet (75 mg) by mouth 3 times daily    Mast cell disease, systemic       senna-docusate 8.6-50 MG per tablet    SENOKOT-S;PERICOLACE    60 tablet    Take 1-2 tablets by mouth 2 times daily as needed for constipation    Acute constipation       SUMAtriptan 50 MG tablet    IMITREX     Take 50 mg by mouth as needed        triamcinolone 0.025 % ointment    KENALOG     Apply topically as needed        * UNABLE TO FIND      3 tablets 3 times daily MEDICATION NAME: calcium D-Glucarate  3 caps contain 180mg of elemental calcium.        * UNABLE TO FIND      Take 2 capsules by mouth 3 times daily Muscle Mag. 2 caps contain B1 20mg, B2 20mg, B6 10mg, magesium 20mg, manganese 2mg.        * UNABLE TO FIND      2 tablets 3 times daily MEDICATION NAME: Digestzymes    Thyroid cancer (H), Postsurgical hypothyroidism, Postsurgical hypoparathyroidism (H)       * UNABLE TO FIND      1 tablet daily MEDICATION NAME: Pure Encapsulations    Thyroid cancer (H), Postsurgical hypothyroidism, Postsurgical hypoparathyroidism (H)       VENTOLIN  (90 BASE) MCG/ACT Inhaler   Generic drug:  albuterol     18 g    INHALE 2 PUFFS INTO THE LUNGS EVERY 4 HOURS AS NEEDED FOR SHORTNESS OF BREATH OR DIFFICULT BREATHING OR  WHEEZING    Mild intermittent asthma without complication       vitamin D3 2000 UNITS Caps     60 capsule    Take 5,000 Units by mouth daily Takes 2 tabs daily    Thyroid cancer (H), Postsurgical hypothyroidism, Papillary carcinoma, follicular variant (H), Postsurgical hypoparathyroidism (H)       * Notice:  This list has 9 medication(s) that are the same as other medications prescribed for you. Read the directions carefully, and ask your doctor or other care provider to review them with you.

## 2018-02-16 NOTE — NURSING NOTE
Chief Complaint   Patient presents with     Port Draw     labs drawn via port by RN     /72 (BP Location: Right arm, Patient Position: Chair, Cuff Size: Adult Regular)  Pulse 78  Temp 98.4  F (36.9  C) (Oral)  SpO2 94%    Vitals taken. Port accessed by RN. Labs collected and sent. Line flushed with NS & Heparin. Pt tolerated well. Pt checked in for next appointment.    Crystal Saba RN

## 2018-02-16 NOTE — PROGRESS NOTES
"Oncology/Hematology Visit Note  Feb 16, 2018    Reason for Visit: follow up of DLBCL s/p C6 DA R-EPOCH. 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 She is currently undergoing treatment with DA-R-EPOCH. Her past medical history is significant for breast cancer in 2011 s/p bilateral mastectomies and BENJIE/BSO up until recently on Tamoxifen, papillary thyroid cancer s/p total thyroidectomy, chronic chest wall pain, and asthma. Briefly, her oncologic history is as follows:     She presented in 8/2017 with dramatically worse chest and back pain. CT 9/1/17 showed lytic lesion right 10th rib extending to right T9-10 neural foramen with vertebral body invasion. There was associated conglomerate of lymphadenopathy around the mid T-spine. She had a CT guided biopsy showing B-cell high grade lymphoma. Pathology showed \"The morphology shows a population of large cells, diffuse lymphoid infiltrate. The cells are atypical with abundant mitotic and apoptotic activity and single cell necrosis. Tumor is CD45 and CD79a positive and focally weakly CD30 positive. The other stains revealed positivity for CD20, BCL6, BCL2 and MUM1, and CD10 and CD21 negative. The CD5 and CD3 highlighted background T-cells.  MYC expression was equivocal with approximately 40% nuclei staining.  Ki-67 proliferative index is greater than 90-95%. The immunohistochemistry is suggestive of non-GCB immunophenotype of diffuse large B-cell lymphoma. The cytogenetics did not show rearrangement of the BCL2 or c-MYC. There is the presence of BCL6 rearrangement in 78% of cells and gains of MYC and BCL2, but no amplifications.\"      Staging LP and BMBx were negative for lymphoma. She started DA-REPOCH 10/6/17. She did well with chemo other than rigors during CIVI. D/c 10/11/17. Given Neulasta 10/12/17. Post cycle 1 complicated by mucositis and worsening of " chronic LE peripheral edema. She began cycle 2 on 10/27/17. Due to a rise in her LD and uric acid levels on that admission day, she underwent a CT scan which showed response to therapy with improvement in her mediastinal lymphadenopathy and right chest wall mass. She had a CR following 4 cycles on PET/CT 12/21.     Interval History:  Phil Campbell states she did not have much LE edema prior to chemotherapy, but since then has had BLE edema requiring lasix. She has been off lasix since 2/1 as she was having multiple falls and dizziness. Since then edema has worsened. She is having difficulty getting on her compression stockings and pants without assistance. She took 20mg of lasix for 3 days with some mild improvement in leg swelling. She is wearing compression stockings but struggles to get them on. She denies any dyspnea or feeling of fluid in her chest--she states this was a misunderstanding. She denies cough. She denies any dizziness or falls after restarting lasix. She has been taking HCTZ and 1 tablet of potassium daily. She denies any dysuria or change in urine frequency or amount. Denies any recent chills, fever.     Current Outpatient Prescriptions   Medication Sig Dispense Refill     furosemide (LASIX) 20 MG tablet Take 1 tablet (20 mg) by mouth 2 times daily 6 tablet 0     diazepam (VALIUM) 5 MG tablet TAKE ONE TABLET BY MOUTH THREE TIMES DAILY AS NEEDED FOR MUSCLE SPASMS 90 tablet 1     cromolyn sodium (NASALCROM) 5.2 MG/ACT AERS Inhaler SPRAY ONE SPRAY( 1 ML) IN NOSTRIL DAILY 1 Bottle 6     lidocaine-prilocaine (EMLA) cream Apply topically as needed (port access pain.) 30 g 1     [employer] FIBER GUMMY BEARS CHILDREN CHEW Take 5 g by mouth daily (2 gummy= 5 g =1 serving) 120 tablet 3     methocarbamol (ROBAXIN) 750 MG tablet Take 1 tablet (750 mg) by mouth 4 times daily . Ok to take a 5th Robaxin on very severe days.       diclofenac (VOLTAREN) 1 % GEL topical gel Apply affected area two times daily PRN using  enclosed dosing card. 100 g 0     oxyCODONE IR (ROXICODONE) 30 MG tablet Take 1 tablet (30 mg) by mouth every 4 hours as needed for moderate to severe pain (Patient taking differently: Take 30 mg by mouth every 4 hours ) 30 tablet 0     celecoxib (CELEBREX) 200 MG capsule Take 1 capsule (200 mg) by mouth 2 times daily 56 capsule 0     ondansetron (ZOFRAN-ODT) 8 MG ODT tab Take 1 tablet (8 mg) by mouth every 8 hours as needed (Patient taking differently: Take 8 mg by mouth every 8 hours ) 30 tablet 1     fluconazole (DIFLUCAN) 200 MG tablet Take 1 tablet (200 mg) by mouth daily 30 tablet 0     cetirizine (ZYRTEC ALLERGY) 10 MG tablet Take 1 tablet (10 mg) by mouth 3 times daily On hold for lab test. 90 tablet 0     acyclovir (ZOVIRAX) 400 MG tablet Take 1 tablet (400 mg) by mouth 2 times daily 60 tablet 0     dexamethasone (DECADRON) 4 MG tablet Take 2 tablets (8 mg) by mouth daily for 2 days Please take on 1/23 and 1/24. 4 tablet 0     hydrochlorothiazide (MICROZIDE) 12.5 MG capsule Take 1 capsule (12.5 mg) by mouth daily 30 capsule 0     levofloxacin (LEVAQUIN) 250 MG tablet Take 1 tablet (250 mg) by mouth daily 30 tablet 0     calcium citrate-vitamin D (CALCIUM CITRATE +D) 315-250 MG-UNIT TABS per tablet Take 2 tablets by mouth twice a week 20 tablet 0     cyclobenzaprine (FLEXERIL) 10 MG tablet Take 1 tablet (10 mg) by mouth 3 times daily as needed (Patient taking differently: Take 10 mg by mouth 3 times daily ) 30 tablet 0     ranitidine (ZANTAC) 75 MG tablet Take 1 tablet (75 mg) by mouth 3 times daily 90 tablet 0     Cholecalciferol (VITAMIN D3) 2000 UNITS CAPS Take 5,000 Units by mouth daily Takes 2 tabs daily 60 capsule 0     furosemide (LASIX) 20 MG tablet Take 1 tablet (20 mg) by mouth 2 times daily (Patient not taking: Reported on 2/1/2018) 60 tablet 0     calcitRIOL (ROCALTROL) 0.25 MCG capsule TAKE 2 CAPSULES BY MOUTH EVERY MORNING AND TAKE 1 CAPSULE BY MOUTH EVERY NIGHT AT BEDTIME (Patient taking  differently: Take 0.25 mcg by mouth 2 times daily TAKE 2 CAPSULES BY MOUTH EVERY MORNING AND TAKE 1 CAPSULE BY MOUTH EVERY NIGHT AT NOON.) 90 capsule 0     cromolyn (OPTICROM) 4 % ophthalmic solution Place 1 drop into both eyes 4 times daily 10 mL 0     montelukast (SINGULAIR) 10 MG tablet Take 2 tablets (20 mg) by mouth 2 times daily 60 tablet 0     potassium chloride SA (KLOR-CON) 20 MEQ CR tablet Take 1 tablet (20 mEq) by mouth 2 times daily 30 tablet 0     morphine (MS CONTIN) 30 MG 12 hr tablet Take 1 tablet (30 mg) by mouth every 8 hours . Take an addition pill at night such that your evening dose is 60mg 120 tablet 0     magic mouthwash suspension (diphenhydrAMINE, lidocaine, aluminum-magnesium & simethicone) Swish and spit 10 mLs in mouth every 6 hours as needed for mouth sores 360 mL 1     prochlorperazine (COMPAZINE) 10 MG tablet Take 10 mg by mouth as needed  3     SUMAtriptan (IMITREX) 50 MG tablet Take 50 mg by mouth as needed  3     acetaminophen (TYLENOL) 325 MG tablet Take 2 tablets (650 mg) by mouth every 4 hours as needed for mild pain or fever 100 tablet      triamcinolone (KENALOG) 0.025 % ointment Apply topically as needed  3     lidocaine (XYLOCAINE) 5 % ointment Apply quarter size amount to chest and back up to 3 times daily as needed for pain. 350 g 1     bisacodyl (DULCOLAX) 5 MG EC tablet Take 3 tablets (15 mg) by mouth daily as needed for constipation 30 tablet 0     polyethylene glycol (MIRALAX) powder Take 17 g (1 capful) by mouth daily 510 g 0     senna-docusate (SENOKOT-S;PERICOLACE) 8.6-50 MG per tablet Take 1-2 tablets by mouth 2 times daily as needed for constipation 60 tablet 0     UNABLE TO FIND Take 2 capsules by mouth 3 times daily Muscle Mag. 2 caps contain B1 20mg, B2 20mg, B6 10mg, magesium 20mg, manganese 2mg.       order for DME Compression stockings, medium grade, please measure and fit. Please dispense a pair. 1 Units 0     COMPOUND CONTAINING CONTROLLED SUBSTANCE (CMPD  RX) - PHARMACY TO MIX COMPOUNDED MEDICATION Apply small amount to affected area two times daily. (Patient not taking: Reported on 1/24/2018) 120 g 6     levothyroxine (SYNTHROID/LEVOTHROID) 112 MCG tablet Mon-Sat: (2 tabs daily) Sunday: 3 tabs (Patient taking differently: 112 mcg Mon-Sat: (2 tabs daily) Sunday: 3 tabs) 189 tablet 3     EPINEPHrine (EPIPEN 2-CARIDAD) 0.3 MG/0.3ML injection Inject 0.3 mLs (0.3 mg) into the muscle once as needed for anaphylaxis (Patient not taking: Reported on 1/24/2018) 0.6 mL 3     VENTOLIN  (90 BASE) MCG/ACT Inhaler INHALE 2 PUFFS INTO THE LUNGS EVERY 4 HOURS AS NEEDED FOR SHORTNESS OF BREATH OR DIFFICULT BREATHING OR WHEEZING 18 g 3     order for DME Equipment being ordered: compression stockings. 20-30 mm or 30 - 40 mm as patient can tolerate. Physical therapy to determine if they should be above or below the knee. 2 Units 4     order for DME Equipment being ordered: compression bra 2 Device 1     aluminum chloride (DRYSOL) 20 % external solution Apply topically At Bedtime 60 mL 3     UNABLE TO FIND 3 tablets 3 times daily MEDICATION NAME: calcium D-Glucarate   3 caps contain 180mg of elemental calcium.       Triamcinolone Acetonide (AZMACORT IN) Inhale 2 puffs into the lungs as needed       UNABLE TO FIND 2 tablets 3 times daily MEDICATION NAME: Digestzymes        UNABLE TO FIND 1 tablet daily MEDICATION NAME: Pure Encapsulations       Probiotic Product (PROBIOTIC DAILY PO) Take 1 capsule by mouth daily Lacto acid bifidobacterium         Physical Examination:  General: The patient is a pleasant female in no acute distress.  There were no vitals taken for this visit.  Wt Readings from Last 10 Encounters:   02/01/18 86.9 kg (191 lb 8 oz)   01/29/18 81.1 kg (178 lb 14.4 oz)   01/24/18 86.3 kg (190 lb 4.8 oz)   01/22/18 87.2 kg (192 lb 3.9 oz)   01/18/18 88.2 kg (194 lb 8 oz)   01/15/18 86.1 kg (189 lb 14.4 oz)   01/09/18 82.3 kg (181 lb 8 oz)   01/03/18 85.7 kg (189 lb)    01/01/18 87.5 kg (193 lb)   12/26/17 85.5 kg (188 lb 7.9 oz)     HEENT: EOMI, PERRL. Sclerae are anicteric. Oral mucosa is pink and moist. No ulcers or thrush  Lymph: Neck is supple with no lymphadenopathy in the cervical or supraclavicular areas.   Heart: Regular rate and rhythm.   Lungs: Clear to auscultation bilaterally.   Abdomen: Bowel sounds present, soft, nontender with no palpable hepatosplenomegaly or masses.   Extremities: legs in compression stockings with swelling visible above compression stockings to mid-thigh. After removal of compression stockings, 2+ pitting edema of lower extremities. No skin breakdown on extremities or cyanosis of toes.  Neuro: Cranial nerves II through XII are grossly intact. Grossly non-focal  Skin: No rashes, petechiae, or bruising noted on exposed skin.    Laboratory Data:  Results for IMMANUEL LOVETT (MRN [id number]) as of 2/16/2018 17:20   2/16/2018 15:23 2/16/2018 16:25   Sodium 141    Potassium 3.6    Chloride 105    Carbon Dioxide 30    Urea Nitrogen 12    Creatinine 0.86    GFR Estimate 68    GFR Estimate If Black 82    Calcium 8.4 (L)    Anion Gap 6    Albumin 3.0 (L)    Protein Total 5.8 (L)    Bilirubin Total 0.3    Alkaline Phosphatase 70    ALT 17    AST 19    CRP Inflammation <2.9    TSH 0.01 (L)    Glucose 137 (H)    WBC 3.2 (L)    Hemoglobin 10.0 (L)    Hematocrit 31.2 (L)    Platelet Count 166    RBC Count 3.00 (L)     (H)    MCH 33.3 (H)    MCHC 32.1    RDW 18.6 (H)    Diff Method Automated Method    % Neutrophils 61.5    % Lymphocytes 12.4    % Monocytes 22.7    % Eosinophils 1.9    % Basophils 1.2    % Immature Granulocytes 0.3    Nucleated RBCs 0    Absolute Neutrophil 2.0    Absolute Lymphocytes 0.4 (L)    Absolute Monocytes 0.7    Absolute Eosinophils 0.1    Absolute Basophils 0.0    Abs Immature Granulocytes 0.0    Absolute Nucleated RBC 0.0    Anisocytosis Slight    Poikilocytosis Slight    Macrocytes Present    Platelet Estimate  "Confirming automa...    Color Urine  Yellow   Appearance Urine  Clear   Glucose Urine  Negative   Bilirubin Urine  Negative   Ketones Urine  Negative   Specific Gravity Urine  1.015   pH Urine  6.0   Protein Albumin Urine  Negative   Urobilinogen mg/dL  4.0 (H)   Nitrite Urine  Negative   Blood Urine  Negative   Leukocyte Esterase Urine  Small (A)   Source  Unspecified Urine   WBC Urine  1   RBC Urine  <1   Mucous Urine  Present (A)     Imaging:   Echo 2/16     Interpretation Summary  Global left ventricular function is normal with an EF of 60-65%. The regional  wall motion is probably normal (difficult to see in some views)  The right ventricle is normal size.Global right ventricular function is  normal.  No significant valvular abnormalities.  No pericardial effusion.     This study was compared with the study from 12/7/2017. There has been no  change.  Assessment and Plan:    BLE Edema:   Unclear why she has ongoing edema, as her last chemotherapy was 1/18-1/22. Echo as above with EF 60-65%. UA neg for protein, spot protein pending. She has had negative US for DVT. She has some mild hypoalbuminemia with albumin 3.0.  --Will start lasix 20 mg BID x 3 days, then 20 mg daily. She will take KCl 20mEq BID x 3 days, then return to once daily dosing  --  Continue HCTZ, as this medication was started by Dr. Castro to help reduce urinary calcium excretion.   --Continue wearing compression stockings. Will request lymphedema therapy appointment again  --RTC next week--patient prefers after 4:30 , so will schedule on 2/23 with labs (BMP)    DLBCL, \"double-hit\"-like, s/p 6 cycles DA-R-EPOCH  -- CR on PET/CT after 4.    --PET/CT on 2/27 and Dr. Sauceda on 2/28    Heme: WBC 3.2, ANC2.0, Hgb 10.0, platelets 166k.      ID: No active concerns. No longer neutropenic  Continue ppx ACV 400mg BID. D/c fluconazole (she is still taking). She has stopped levaquin     History of stage 1, ER/FL+, HER2- breast cancer s/p bilateral " mastectomies and BENJIE/BSO  Follows with Dr. Crawley. Oncotype recurrence score 11%. Was initially on Arimidex (4446-1217) but changed to Tamoxifen due to arthralgias. Tamoxifen held since 10/5 and continue to hold with chemotherapy. Last f/u Dr. Crawley was on 11/27/17.     History of papillary thyroid cancer, s/p total thyroidectomy  Follows with Dr. Castro. Has post surgical hypothyroidism. Continue levothyroxine and calcitriol as prescribed. Neck u/s on 11/2 shows no evidence of disease.  --TSH 0.01 but T4 WNL     History of Rachael en Y gastric bypass  Continue supplements (calcium citrate-vitamin D, vitamin D3, magnesium citrate). Also has iron deficiency anemia likely from poor absorption and per Dr. Sauceda, she should continue 325mg PO ferrous sulfate TID.      Chronic chest wall pain s/p mastectomies  Follows with Dr. Benitez. Palliative care to continue to prescribe pain medications. Taking MS Contin 30/30/60 mg tid and oxycodone prn, currently 15 mg qid for breakthrough pain, and celebrex.     Nausea. Mild. No vomiting. Continue Zofran, compazine prn.     Constipation.  Under control with Colace, fiber tabs, and MiraLax when needed.     Selina Elizondo PA-C  [facility]  [address] [place] [address] [phone]

## 2018-02-16 NOTE — NURSING NOTE
"Oncology Rooming Note    February 16, 2018 3:45 PM   Tisha Arias is a 57 year old female who presents for:    Chief Complaint   Patient presents with     Port Draw     labs drawn via port by RN     Oncology Clinic Visit     Return visit related to Lymphoma     Initial Vitals: /72 (BP Location: Right arm, Patient Position: Chair, Cuff Size: Adult Regular)  Pulse 78  Temp 98.4  F (36.9  C) (Oral)  SpO2 94% Estimated body mass index is 31.87 kg/(m^2) as calculated from the following:    Height as of 2/1/18: 1.651 m (5' 5\").    Weight as of 2/1/18: 86.9 kg (191 lb 8 oz). There is no height or weight on file to calculate BSA.  No Pain (0) Comment: Data Unavailable   No LMP recorded. Patient has had a hysterectomy.  Allergies reviewed: Yes  Medications reviewed: Yes    Medications: MEDICATION REFILLS NEEDED TODAY. Provider was notified.  Pharmacy name entered into [facility]: [id number] - [place], [place] - [address] [facility] [place] [place]    Clinical concerns: Refills needed today. Provider was notified.    10 minutes for nursing intake (face to face time)     Shahla Allison LPN            "

## 2018-02-16 NOTE — PATIENT INSTRUCTIONS
Stop allopurinol, fluconazole and levaquin. Continue Acyclovir twice daily.  Take 20 mg of lasix twice daily for 3 days, then once daily. On the days you are taking lasix twice daily, take potassium twice daily. Then take potassium once daily.

## 2018-02-20 DIAGNOSIS — C85.10 HIGH GRADE B-CELL LYMPHOMA (H): ICD-10-CM

## 2018-02-20 DIAGNOSIS — G89.3 NEOPLASM RELATED PAIN: ICD-10-CM

## 2018-02-21 RX ORDER — MORPHINE SULFATE 30 MG/1
30 TABLET, FILM COATED, EXTENDED RELEASE ORAL EVERY 8 HOURS
Qty: 120 TABLET | Refills: 0 | Status: SHIPPED | OUTPATIENT
Start: 2018-02-24 | End: 2018-03-14

## 2018-02-21 RX ORDER — OXYCODONE HYDROCHLORIDE 30 MG/1
30 TABLET ORAL EVERY 4 HOURS PRN
Qty: 180 TABLET | Refills: 0 | Status: SHIPPED | OUTPATIENT
Start: 2018-02-24 | End: 2018-03-14

## 2018-02-21 NOTE — TELEPHONE ENCOUNTER
Received VM from patient requesting refill of oxycodone & morphine.     Last refills: 1/27/2018 -- I note there is a fill for #30 tabs oxycodone on 1/22/2018 from Dr. Mtz following a hospital discharge  Last seen during a hospitalization  Scheduled for follow up 2/28/2018     Will route request to MD for review.     Reviewed [place]  Report.

## 2018-02-23 ENCOUNTER — ONCOLOGY VISIT (OUTPATIENT)
Dept: ONCOLOGY | Facility: CLINIC | Age: 58
End: 2018-02-23
Attending: PHYSICIAN ASSISTANT
Payer: COMMERCIAL

## 2018-02-23 ENCOUNTER — RECORDS - HEALTHEAST (OUTPATIENT)
Dept: ADMINISTRATIVE | Facility: OTHER | Age: 58
End: 2018-02-23

## 2018-02-23 VITALS
SYSTOLIC BLOOD PRESSURE: 129 MMHG | DIASTOLIC BLOOD PRESSURE: 75 MMHG | RESPIRATION RATE: 16 BRPM | HEART RATE: 98 BPM | BODY MASS INDEX: 32.74 KG/M2 | HEIGHT: 65 IN | TEMPERATURE: 97.9 F | WEIGHT: 196.5 LBS | OXYGEN SATURATION: 99 %

## 2018-02-23 DIAGNOSIS — C85.10 HIGH GRADE B-CELL LYMPHOMA (H): ICD-10-CM

## 2018-02-23 DIAGNOSIS — I89.0 LYMPHEDEMA OF BOTH LOWER EXTREMITIES: Primary | ICD-10-CM

## 2018-02-23 DIAGNOSIS — R60.0 BILATERAL LOWER EXTREMITY EDEMA: ICD-10-CM

## 2018-02-23 LAB
ANION GAP SERPL CALCULATED.3IONS-SCNC: 8 MMOL/L (ref 3–14)
BASOPHILS # BLD AUTO: 0 10E9/L (ref 0–0.2)
BASOPHILS NFR BLD AUTO: 0.9 %
BUN SERPL-MCNC: 12 MG/DL (ref 7–30)
CALCIUM SERPL-MCNC: 8.4 MG/DL (ref 8.5–10.1)
CHLORIDE SERPL-SCNC: 105 MMOL/L (ref 94–109)
CO2 SERPL-SCNC: 27 MMOL/L (ref 20–32)
CREAT SERPL-MCNC: 0.78 MG/DL (ref 0.52–1.04)
DIFFERENTIAL METHOD BLD: ABNORMAL
EOSINOPHIL # BLD AUTO: 0.2 10E9/L (ref 0–0.7)
EOSINOPHIL NFR BLD AUTO: 4.8 %
ERYTHROCYTE [DISTWIDTH] IN BLOOD BY AUTOMATED COUNT: 16.9 % (ref 10–15)
GFR SERPL CREATININE-BSD FRML MDRD: 76 ML/MIN/1.7M2
GLUCOSE SERPL-MCNC: 144 MG/DL (ref 70–99)
HCT VFR BLD AUTO: 32.9 % (ref 35–47)
HGB BLD-MCNC: 10.8 G/DL (ref 11.7–15.7)
IMM GRANULOCYTES # BLD: 0 10E9/L (ref 0–0.4)
IMM GRANULOCYTES NFR BLD: 0.5 %
LYMPHOCYTES # BLD AUTO: 0.5 10E9/L (ref 0.8–5.3)
LYMPHOCYTES NFR BLD AUTO: 12.2 %
MCH RBC QN AUTO: 34 PG (ref 26.5–33)
MCHC RBC AUTO-ENTMCNC: 32.8 G/DL (ref 31.5–36.5)
MCV RBC AUTO: 104 FL (ref 78–100)
MONOCYTES # BLD AUTO: 0.7 10E9/L (ref 0–1.3)
MONOCYTES NFR BLD AUTO: 15.6 %
NEUTROPHILS # BLD AUTO: 2.9 10E9/L (ref 1.6–8.3)
NEUTROPHILS NFR BLD AUTO: 66 %
NRBC # BLD AUTO: 0 10*3/UL
NRBC BLD AUTO-RTO: 0 /100
PLATELET # BLD AUTO: 182 10E9/L (ref 150–450)
POTASSIUM SERPL-SCNC: 3.9 MMOL/L (ref 3.4–5.3)
RBC # BLD AUTO: 3.18 10E12/L (ref 3.8–5.2)
SODIUM SERPL-SCNC: 140 MMOL/L (ref 133–144)
WBC # BLD AUTO: 4.4 10E9/L (ref 4–11)

## 2018-02-23 PROCEDURE — 36591 DRAW BLOOD OFF VENOUS DEVICE: CPT

## 2018-02-23 PROCEDURE — 99214 OFFICE O/P EST MOD 30 MIN: CPT | Mod: ZP | Performed by: PHYSICIAN ASSISTANT

## 2018-02-23 PROCEDURE — 25000128 H RX IP 250 OP 636: Mod: ZF | Performed by: PHYSICIAN ASSISTANT

## 2018-02-23 PROCEDURE — 85025 COMPLETE CBC W/AUTO DIFF WBC: CPT | Performed by: PHYSICIAN ASSISTANT

## 2018-02-23 PROCEDURE — 80048 BASIC METABOLIC PNL TOTAL CA: CPT | Performed by: PHYSICIAN ASSISTANT

## 2018-02-23 PROCEDURE — G0463 HOSPITAL OUTPT CLINIC VISIT: HCPCS | Mod: ZF

## 2018-02-23 RX ORDER — FUROSEMIDE 20 MG
20 TABLET ORAL 2 TIMES DAILY
Qty: 60 TABLET | Refills: 0 | Status: SHIPPED | OUTPATIENT
Start: 2018-02-23 | End: 2018-03-16

## 2018-02-23 RX ORDER — HEPARIN SODIUM (PORCINE) LOCK FLUSH IV SOLN 100 UNIT/ML 100 UNIT/ML
5 SOLUTION INTRAVENOUS
Status: COMPLETED | OUTPATIENT
Start: 2018-02-23 | End: 2018-02-23

## 2018-02-23 RX ORDER — LEVOFLOXACIN 250 MG/1
TABLET, FILM COATED ORAL
COMMUNITY
Start: 2018-01-22 | End: 2018-02-28

## 2018-02-23 RX ORDER — LIDOCAINE/PRILOCAINE 2.5 %-2.5%
CREAM (GRAM) TOPICAL PRN
Qty: 60 G | Refills: 1 | Status: SHIPPED | OUTPATIENT
Start: 2018-02-23

## 2018-02-23 RX ADMIN — SODIUM CHLORIDE, PRESERVATIVE FREE 5 ML: 5 INJECTION INTRAVENOUS at 16:19

## 2018-02-23 ASSESSMENT — PAIN SCALES - GENERAL: PAINLEVEL: SEVERE PAIN (7)

## 2018-02-23 NOTE — MR AVS SNAPSHOT
"              After Visit Summary   2/23/2018    Tisha Arias    MRN: [id number]           Patient Information     Date Of Birth          1960        Visit Information        Provider Department      2/23/2018 4:40 PM Selina Elizondo PA [facility]        Today's Diagnoses     Lymphedema of both lower extremities    -  1    High grade B-cell lymphoma (H)        Bilateral lower extremity edema           Follow-ups after your visit        Additional Services     LYMPHEDEMA THERAPY REFERRAL       *This therapy referral will be filtered to a centralized scheduling office at [facility] and the patient will receive a call to schedule an appointment at a [place] location most convenient for them. *   If you have not heard from the scheduling office within 2 business days, please call [phone] for all locations, with the exception of [place], please call [phone].     Treatment: PT or OT Evaluation & Treatment  Special Instructions: Evaulate and treat  PT/OT Treatment Diagnosis: Edema    Please be aware that coverage of these services is subject to the terms and limitations of your health insurance plan.  Call member services at your health plan with any benefit or coverage questions.      **Note to Provider:  If you are referring outside of [place] for the therapy appointment, please list the name of the location in the \"special instructions\" above, print the referral and give to the patient to schedule the appointment.                  Your next 10 appointments already scheduled     Feb 27, 2018  6:45 AM CST   (Arrive by 6:30 AM)   PET ONCOLOGY WHOLE BODY with UUPET1   [facility] PET CT ([facility])    [address] [address] [phone]           Tell your doctor:   If there is any chance you may be pregnant or if you are breastfeeding.   If you have problems lying in small spaces " (claustrophobia). If you do, your doctor may give you medicine to help you relax. If you have diabetes:   Have your exam early in the morning. Your blood glucose will go up as the day goes by.   Your glucose level must be 180 or less at the start of the exam. Please take any medicines you need to ensure this blood glucose level.   If you are taking insulin in the morning take with breakfast by 6 am and schedule procedure between 12 and 2:15 pm.   If you are taking insulin at night take nightly dose, fast overnight, schedule procedure before 10 am.   If you take insulin both morning and night take morning dose by 6am and schedule procedure between 12 and 2:15 pm.   24 hours before your scan: Don t do any heavy exercise. (No jogging, aerobics or other workouts.) Exercise will make your pictures less accurate.  7 hours before your scan: Eat a low carb, high protein meal (Lean meats, seafood, beans, soy, low-fat dairy, eggs, nuts & seeds). 6 hours before your scan:   Stop all food and liquids (except water).   Do not chew gum or suck on mints.   If you need to take medicine with food, you may take it with a few crackers.  Please call your Imaging Department at your exam site with any questions.            Feb 28, 2018  9:30 AM CST   (Arrive by 9:15 AM)   Return Visit with Edu Benitez MD   [facility] ([place] [facility])    [address] [place] [address]  [address]  [facility] [id number] [phone]            Feb 28, 2018  3:30 PM CST   RETURN ONC with Garima Sauceda MD   [facility] Blood and Marrow Transplant ([place] [facility])    [address] [place] [address]  [address]  [facility] [id number] [phone]            May 21, 2018  4:20 PM CDT   (Arrive by 4:05 PM)   RETURN ENDOCRINE with Avelina Castro MD   [facility] Endocrinology ([facility])    [address] [place] [address]  3rd Floor  [facility] [id number] [phone]             "Jun 11, 2018  4:00 PM CDT   (Arrive by 3:45 PM)   Return Visit with Shahla Crawley MD   [facility] ([facility])    [address]  [address] [place] [address] [phone]              Who to contact     If you have questions or need follow up information about today's clinic visit or your schedule please contact [facility] directly at [phone].  Normal or non-critical lab and imaging results will be communicated to you by [employer], letter or phone within 4 business days after the clinic has received the results. If you do not hear from us within 7 days, please contact the clinic through [employer] or phone. If you have a critical or abnormal lab result, we will notify you by phone as soon as possible.  Submit refill requests through [employer] or call your pharmacy and they will forward the refill request to us. Please allow 3 business days for your refill to be completed.          Additional Information About Your Visit        [employer] Information     [employer] gives you secure access to your electronic health record. If you see a primary care provider, you can also send messages to your care team and make appointments. If you have questions, please call your primary care clinic.  If you do not have a primary care provider, please call [phone] and they will assist you.        Care EveryWhere ID     This is your Care EveryWhere ID. This could be used by other organizations to access your [facility] medical records  [id number]        Your Vitals Were     Pulse Temperature Respirations Height Pulse Oximetry BMI (Body Mass Index)    98 97.9  F (36.6  C) (Oral) 16 1.651 m (5' 5\") 99% 32.7 kg/m2       Blood Pressure from Last 3 Encounters:   02/23/18 129/75   02/16/18 109/72   02/08/18 99/60    Weight from Last 3 Encounters:   02/23/18 89.1 kg (196 lb 8 oz)   02/01/18 86.9 kg (191 lb 8 oz)   01/29/18 81.1 kg (178 lb 14.4 oz)            "   We Performed the Following     CBC with platelets differential     LYMPHEDEMA THERAPY REFERRAL          Today's Medication Changes          These changes are accurate as of 2/23/18  6:16 PM.  If you have any questions, ask your nurse or doctor.               These medicines have changed or have updated prescriptions.        Dose/Directions    calcitRIOL 0.25 MCG capsule   Commonly known as:  ROCALTROL   This may have changed:    - how much to take  - how to take this  - when to take this  - additional instructions   Used for:  Postsurgical hypothyroidism, Papillary carcinoma, follicular variant (H), Metastasis to cervical lymph node (H)        TAKE 2 CAPSULES BY MOUTH EVERY MORNING AND TAKE 1 CAPSULE BY MOUTH EVERY NIGHT AT BEDTIME   Quantity:  90 capsule   Refills:  0       cyclobenzaprine 10 MG tablet   Commonly known as:  FLEXERIL   This may have changed:  when to take this   Used for:  High grade B-cell lymphoma (H)        Dose:  10 mg   Take 1 tablet (10 mg) by mouth 3 times daily as needed   Quantity:  30 tablet   Refills:  0       * furosemide 20 MG tablet   Commonly known as:  LASIX   This may have changed:  Another medication with the same name was changed. Make sure you understand how and when to take each.   Used for:  Localized edema   Changed by:  Selina Elizondo PA        Dose:  20 mg   Take 1 tablet (20 mg) by mouth 2 times daily   Quantity:  60 tablet   Refills:  0       * furosemide 20 MG tablet   Commonly known as:  LASIX   This may have changed:    - how much to take  - how to take this  - when to take this  - additional instructions   Used for:  Bilateral lower extremity edema   Changed by:  Selina Elizondo PA        Dose:  20 mg   Take 1 tablet (20 mg) by mouth 2 times daily   Quantity:  60 tablet   Refills:  0       levothyroxine 112 MCG tablet   Commonly known as:  SYNTHROID/LEVOTHROID   This may have changed:    - how much to take  - additional instructions   Used for:  Postsurgical  hypothyroidism, Papillary carcinoma, follicular variant (H), Postsurgical hypoparathyroidism (H), Thyroid cancer (H)        Mon-Sat: (2 tabs daily) Sunday: 3 tabs   Quantity:  189 tablet   Refills:  3       * lidocaine-prilocaine cream   Commonly known as:  EMLA   This may have changed:  Another medication with the same name was added. Make sure you understand how and when to take each.   Used for:  Neoplasm related pain, Chest wall pain   Changed by:  Selina Elizondo PA        Apply topically as needed (port access pain.)   Quantity:  30 g   Refills:  1       * lidocaine-prilocaine cream   Commonly known as:  EMLA   This may have changed:  You were already taking a medication with the same name, and this prescription was added. Make sure you understand how and when to take each.   Used for:  High grade B-cell lymphoma (H)   Changed by:  Selina Elizondo PA        Apply topically as needed for moderate pain   Quantity:  60 g   Refills:  1       ondansetron 8 MG ODT tab   Commonly known as:  ZOFRAN-ODT   This may have changed:  when to take this   Used for:  High grade B-cell lymphoma (H), Nausea and vomiting, intractability of vomiting not specified, unspecified vomiting type        Dose:  8 mg   Take 1 tablet (8 mg) by mouth every 8 hours as needed   Quantity:  30 tablet   Refills:  1       * Notice:  This list has 4 medication(s) that are the same as other medications prescribed for you. Read the directions carefully, and ask your doctor or other care provider to review them with you.         Where to get your medicines      These medications were sent to [id number] - [place] - [address] [address]  [address] [address] [place] [address]     Phone:  [phone]     furosemide 20 MG tablet    lidocaine-prilocaine cream                Primary Care Provider Office Phone # Fax #    Shahla Crawley [phone]       [address] [place] [id number]        [facility]  Access to Services     [facility]: Hadii aad ku hadmarylu Venegas, wacastilloda luqadaha, qaybta kamounaranjan brown. So [facility] [phone].    ATENCIÓN: Si habla veda, tiene a stiles disposición servicios gratuitos de asistencia lingüística. Llame al [phone].    We comply with applicable federal civil rights laws and [place] laws. We do not discriminate on the basis of race, color, national origin, age, disability, sex, sexual orientation, or gender identity.            Thank you!     Thank you for choosing [facility]  for your care. Our goal is always to provide you with excellent care. Hearing back from our patients is one way we can continue to improve our services. Please take a few minutes to complete the written survey that you may receive in the mail after your visit with us. Thank you!             Your Updated Medication List - Protect others around you: Learn how to safely use, store and throw away your medicines at www.disposemymeds.org.          This list is accurate as of 2/23/18  6:16 PM.  Always use your most recent med list.                   Brand Name Dispense Instructions for use Diagnosis    acetaminophen 325 MG tablet    TYLENOL    100 tablet    Take 2 tablets (650 mg) by mouth every 4 hours as needed for mild pain or fever    High grade B-cell lymphoma (H)       acyclovir 400 MG tablet    ZOVIRAX    60 tablet    Take 1 tablet (400 mg) by mouth 2 times daily    High grade B-cell lymphoma (H)       aluminum chloride 20 % external solution    DRYSOL    60 mL    Apply topically At Bedtime    Rash, Intertrigo       AZMACORT IN      Inhale 2 puffs into the lungs as needed        bisacodyl 5 MG EC tablet     30 tablet    Take 3 tablets (15 mg) by mouth daily as needed for constipation    Constipation, unspecified constipation type       calcitRIOL 0.25 MCG capsule    ROCALTROL    90 capsule    TAKE 2 CAPSULES BY MOUTH EVERY MORNING AND  TAKE 1 CAPSULE BY MOUTH EVERY NIGHT AT BEDTIME    Postsurgical hypothyroidism, Papillary carcinoma, follicular variant (H), Metastasis to cervical lymph node (H)       calcium citrate-vitamin D 315-250 MG-UNIT Tabs per tablet    CALCIUM CITRATE +D    20 tablet    Take 2 tablets by mouth twice a week    High grade B-cell lymphoma (H), Hypocalcemia       celecoxib 200 MG capsule    celeBREX    56 capsule    Take 1 capsule (200 mg) by mouth 2 times daily    Chest wall pain       cetirizine 10 MG tablet    ZYRTEC ALLERGY    90 tablet    Take 1 tablet (10 mg) by mouth 3 times daily On hold for lab test.    High grade B-cell lymphoma (H)       COMPOUND CONTAINING CONTROLLED SUBSTANCE - PHARMACY TO MIX COMPOUNDED MEDICATION    CMPD RX    120 g    Apply small amount to affected area two times daily.    Neoplasm related pain       cromolyn 4 % ophthalmic solution    OPTICROM    10 mL    Place 1 drop into both eyes 4 times daily    Idiopathic mast cell activation syndrome (H)       cromolyn sodium 5.2 MG/ACT Aers Inhaler    NASALCROM    1 Bottle    SPRAY ONE SPRAY( 1 ML) IN NOSTRIL DAILY    Mast cell disease, systemic       [employer] GUMMY BE[employer] CHILDREN Chew     120 tablet    Take 5 g by mouth daily (2 gummy= 5 g =1 serving)    High grade B-cell lymphoma (H)       cyclobenzaprine 10 MG tablet    FLEXERIL    30 tablet    Take 1 tablet (10 mg) by mouth 3 times daily as needed    High grade B-cell lymphoma (H)       diazepam 5 MG tablet    VALIUM    90 tablet    TAKE ONE TABLET BY MOUTH THREE TIMES DAILY AS NEEDED FOR MUSCLE SPASMS    Chest wall pain       diclofenac 1 % Gel topical gel    VOLTAREN    100 g    Apply affected area two times daily PRN using enclosed dosing card.    Myofascial pain       EPINEPHrine 0.3 MG/0.3ML injection 2-pack    EPIPEN 2-CARIDAD    0.6 mL    Inject 0.3 mLs (0.3 mg) into the muscle once as needed for anaphylaxis        * furosemide 20 MG tablet    LASIX    60 tablet    Take 1 tablet (20 mg) by mouth  2 times daily    Localized edema       * furosemide 20 MG tablet    LASIX    60 tablet    Take 1 tablet (20 mg) by mouth 2 times daily    Bilateral lower extremity edema       hydrochlorothiazide 12.5 MG capsule    MICROZIDE    30 capsule    Take 1 capsule (12.5 mg) by mouth daily    Hypocalcemia       levofloxacin 250 MG tablet    LEVAQUIN          levothyroxine 112 MCG tablet    SYNTHROID/LEVOTHROID    189 tablet    Mon-Sat: (2 tabs daily) Sunday: 3 tabs    Postsurgical hypothyroidism, Papillary carcinoma, follicular variant (H), Postsurgical hypoparathyroidism (H), Thyroid cancer (H)       lidocaine 5 % ointment    XYLOCAINE    350 g    Apply quarter size amount to chest and back up to 3 times daily as needed for pain.    Chest wall pain       lidocaine visc 2% 2.5mL/5mL & maalox/mylanta w/ simeth 2.5mL/5mL & diphenhydrAMINE 5mg/5mL Susp suspension    [facility]    360 mL    Swish and spit 10 mLs in mouth every 6 hours as needed for mouth sores    Stomatitis and mucositis, High grade B-cell lymphoma (H)       * lidocaine-prilocaine cream    EMLA    30 g    Apply topically as needed (port access pain.)    Neoplasm related pain, Chest wall pain       * lidocaine-prilocaine cream    EMLA    60 g    Apply topically as needed for moderate pain    High grade B-cell lymphoma (H)       methocarbamol 750 MG tablet    ROBAXIN     Take 1 tablet (750 mg) by mouth 4 times daily . Ok to take a 5th Robaxin on very severe days.    Myofascial pain       montelukast 10 MG tablet    SINGULAIR    60 tablet    Take 2 tablets (20 mg) by mouth 2 times daily    Idiopathic mast cell activation syndrome (H)       morphine 30 MG 12 hr tablet   Start taking on:  2/24/2018    MS CONTIN    120 tablet    Take 1 tablet (30 mg) by mouth every 8 hours . Take an addition pill at night such that your evening dose is 60mg    Neoplasm related pain       ondansetron 8 MG ODT tab    ZOFRAN-ODT    30 tablet    Take 1 tablet (8 mg) by mouth  every 8 hours as needed    High grade B-cell lymphoma (H), Nausea and vomiting, intractability of vomiting not specified, unspecified vomiting type       * order for DME     2 Units    Equipment being ordered: compression stockings. 20-30 mm or 30 - 40 mm as patient can tolerate. Physical therapy to determine if they should be above or below the knee.    Venous stasis       * order for DME     2 Device    Equipment being ordered: compression bra    Malignant neoplasm of right female breast, unspecified site of breast       * order for DME     1 Units    Compression stockings, medium grade, please measure and fit. Please dispense a pair.    Peripheral edema       oxyCODONE IR 30 MG tablet   Start taking on:  2/24/2018    ROXICODONE    180 tablet    Take 1 tablet (30 mg) by mouth every 4 hours as needed for moderate to severe pain    High grade B-cell lymphoma (H)       polyethylene glycol powder    MIRALAX    510 g    Take 17 g (1 capful) by mouth daily    Acute constipation       potassium chloride SA 20 MEQ CR tablet    KLOR-CON    30 tablet    Take 1 tablet (20 mEq) by mouth 2 times daily    Hypokalemia       PROBIOTIC DAILY PO      Take 1 capsule by mouth daily Lacto acid bifidobacterium    Breast cancer, unspecified laterality, Thyroid cancer (H), Chronic arthralgias of knees and hips, unspecified laterality       prochlorperazine 10 MG tablet    COMPAZINE     Take 10 mg by mouth as needed        ranitidine 75 MG tablet    ZANTAC    90 tablet    Take 1 tablet (75 mg) by mouth 3 times daily    Mast cell disease, systemic       senna-docusate 8.6-50 MG per tablet    SENOKOT-S;PERICOLACE    60 tablet    Take 1-2 tablets by mouth 2 times daily as needed for constipation    Acute constipation       SUMAtriptan 50 MG tablet    IMITREX     Take 50 mg by mouth as needed        triamcinolone 0.025 % ointment    KENALOG     Apply topically as needed        * UNABLE TO FIND      3 tablets 3 times daily MEDICATION NAME:  calcium D-Glucarate  3 caps contain 180mg of elemental calcium.        * UNABLE TO FIND      Take 2 capsules by mouth 3 times daily Muscle Mag. 2 caps contain B1 20mg, B2 20mg, B6 10mg, magesium 20mg, manganese 2mg.        * UNABLE TO FIND      2 tablets 3 times daily MEDICATION NAME: Digestzymes    Thyroid cancer (H), Postsurgical hypothyroidism, Postsurgical hypoparathyroidism (H)       * UNABLE TO FIND      1 tablet daily MEDICATION NAME: Pure Encapsulations    Thyroid cancer (H), Postsurgical hypothyroidism, Postsurgical hypoparathyroidism (H)       VENTOLIN  (90 BASE) MCG/ACT Inhaler   Generic drug:  albuterol     18 g    INHALE 2 PUFFS INTO THE LUNGS EVERY 4 HOURS AS NEEDED FOR SHORTNESS OF BREATH OR DIFFICULT BREATHING OR WHEEZING    Mild intermittent asthma without complication       vitamin D3 2000 UNITS Caps     60 capsule    Take 5,000 Units by mouth daily Takes 2 tabs daily    Thyroid cancer (H), Postsurgical hypothyroidism, Papillary carcinoma, follicular variant (H), Postsurgical hypoparathyroidism (H)       * Notice:  This list has 11 medication(s) that are the same as other medications prescribed for you. Read the directions carefully, and ask your doctor or other care provider to review them with you.

## 2018-02-23 NOTE — LETTER
"2/23/2018      RE: Tisha Arias  [address] [place] [address]       Oncology/Hematology Visit Note  Feb 23, 2018    Reason for Visit: follow up of DLBCL s/p C6 DA R-EPOCH. 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 She is currently undergoing treatment with DA-R-EPOCH. Her past medical history is significant for breast cancer in 2011 s/p bilateral mastectomies and BENJIE/BSO up until recently on Tamoxifen, papillary thyroid cancer s/p total thyroidectomy, chronic chest wall pain, and asthma. Briefly, her oncologic history is as follows:     She presented in 8/2017 with dramatically worse chest and back pain. CT 9/1/17 showed lytic lesion right 10th rib extending to right T9-10 neural foramen with vertebral body invasion. There was associated conglomerate of lymphadenopathy around the mid T-spine. She had a CT guided biopsy showing B-cell high grade lymphoma. Pathology showed \"The morphology shows a population of large cells, diffuse lymphoid infiltrate. The cells are atypical with abundant mitotic and apoptotic activity and single cell necrosis. Tumor is CD45 and CD79a positive and focally weakly CD30 positive. The other stains revealed positivity for CD20, BCL6, BCL2 and MUM1, and CD10 and CD21 negative. The CD5 and CD3 highlighted background T-cells.  MYC expression was equivocal with approximately 40% nuclei staining.  Ki-67 proliferative index is greater than 90-95%. The immunohistochemistry is suggestive of non-GCB immunophenotype of diffuse large B-cell lymphoma. The cytogenetics did not show rearrangement of the BCL2 or c-MYC. There is the presence of BCL6 rearrangement in 78% of cells and gains of MYC and BCL2, but no amplifications.\"      Staging LP and BMBx were negative for lymphoma. She started DA-REPOCH 10/6/17. She did well with chemo other than rigors during CIVI. D/c 10/11/17. " Given Neulasta 10/12/17. Post cycle 1 complicated by mucositis and worsening of chronic LE peripheral edema. She began cycle 2 on 10/27/17. Due to a rise in her LD and uric acid levels on that admission day, she underwent a CT scan which showed response to therapy with improvement in her mediastinal lymphadenopathy and right chest wall mass. She had a CR following 4 cycles on PET/CT 12/21.     Interval History:  She took lasix BID x 3 days with some improvement, then cut back to once daily but leg swelling increased again. She denies any dyspnea. She took potassium as directed. Her weight is up 5 lbs in past 3 weeks. She is frustrated with the discomfort of the leg swelling. She has some peeling skin on soles of feet, but no open sores.     Current Outpatient Prescriptions   Medication Sig Dispense Refill     [START ON 2/24/2018] morphine (MS CONTIN) 30 MG 12 hr tablet Take 1 tablet (30 mg) by mouth every 8 hours . Take an addition pill at night such that your evening dose is 60mg 120 tablet 0     [START ON 2/24/2018] oxyCODONE IR (ROXICODONE) 30 MG tablet Take 1 tablet (30 mg) by mouth every 4 hours as needed for moderate to severe pain 180 tablet 0     acyclovir (ZOVIRAX) 400 MG tablet Take 1 tablet (400 mg) by mouth 2 times daily 60 tablet 0     furosemide (LASIX) 20 MG tablet Take 20 mg (1 tablet) twice a day for 3 days, then take 1 tablet daily 30 tablet 0     potassium chloride SA (KLOR-CON) 20 MEQ CR tablet Take 1 tablet (20 mEq) by mouth 2 times daily 30 tablet 0     diazepam (VALIUM) 5 MG tablet TAKE ONE TABLET BY MOUTH THREE TIMES DAILY AS NEEDED FOR MUSCLE SPASMS 90 tablet 1     cromolyn sodium (NASALCROM) 5.2 MG/ACT AERS Inhaler SPRAY ONE SPRAY( 1 ML) IN NOSTRIL DAILY 1 Bottle 6     lidocaine-prilocaine (EMLA) cream Apply topically as needed (port access pain.) 30 g 1     [employer] FIBER GUMMY BEARS CHILDREN CHEW Take 5 g by mouth daily (2 gummy= 5 g =1 serving) 120 tablet 3     methocarbamol (ROBAXIN) 750 MG  tablet Take 1 tablet (750 mg) by mouth 4 times daily . Ok to take a 5th Robaxin on very severe days.       diclofenac (VOLTAREN) 1 % GEL topical gel Apply affected area two times daily PRN using enclosed dosing card. 100 g 0     celecoxib (CELEBREX) 200 MG capsule Take 1 capsule (200 mg) by mouth 2 times daily 56 capsule 0     ondansetron (ZOFRAN-ODT) 8 MG ODT tab Take 1 tablet (8 mg) by mouth every 8 hours as needed (Patient taking differently: Take 8 mg by mouth every 8 hours ) 30 tablet 1     cetirizine (ZYRTEC ALLERGY) 10 MG tablet Take 1 tablet (10 mg) by mouth 3 times daily On hold for lab test. 90 tablet 0     hydrochlorothiazide (MICROZIDE) 12.5 MG capsule Take 1 capsule (12.5 mg) by mouth daily 30 capsule 0     calcium citrate-vitamin D (CALCIUM CITRATE +D) 315-250 MG-UNIT TABS per tablet Take 2 tablets by mouth twice a week 20 tablet 0     cyclobenzaprine (FLEXERIL) 10 MG tablet Take 1 tablet (10 mg) by mouth 3 times daily as needed (Patient taking differently: Take 10 mg by mouth 3 times daily ) 30 tablet 0     ranitidine (ZANTAC) 75 MG tablet Take 1 tablet (75 mg) by mouth 3 times daily 90 tablet 0     Cholecalciferol (VITAMIN D3) 2000 UNITS CAPS Take 5,000 Units by mouth daily Takes 2 tabs daily 60 capsule 0     calcitRIOL (ROCALTROL) 0.25 MCG capsule TAKE 2 CAPSULES BY MOUTH EVERY MORNING AND TAKE 1 CAPSULE BY MOUTH EVERY NIGHT AT BEDTIME (Patient taking differently: Take 0.25 mcg by mouth 2 times daily TAKE 2 CAPSULES BY MOUTH EVERY MORNING AND TAKE 1 CAPSULE BY MOUTH EVERY NIGHT AT NOON.) 90 capsule 0     cromolyn (OPTICROM) 4 % ophthalmic solution Place 1 drop into both eyes 4 times daily 10 mL 0     montelukast (SINGULAIR) 10 MG tablet Take 2 tablets (20 mg) by mouth 2 times daily 60 tablet 0     prochlorperazine (COMPAZINE) 10 MG tablet Take 10 mg by mouth as needed  3     SUMAtriptan (IMITREX) 50 MG tablet Take 50 mg by mouth as needed  3     acetaminophen (TYLENOL) 325 MG tablet Take 2 tablets  (650 mg) by mouth every 4 hours as needed for mild pain or fever 100 tablet      lidocaine (XYLOCAINE) 5 % ointment Apply quarter size amount to chest and back up to 3 times daily as needed for pain. 350 g 1     bisacodyl (DULCOLAX) 5 MG EC tablet Take 3 tablets (15 mg) by mouth daily as needed for constipation 30 tablet 0     order for DME Compression stockings, medium grade, please measure and fit. Please dispense a pair. 1 Units 0     levothyroxine (SYNTHROID/LEVOTHROID) 112 MCG tablet Mon-Sat: (2 tabs daily) Sunday: 3 tabs (Patient taking differently: 112 mcg Mon-Sat: (2 tabs daily) Sunday: 3 tabs) 189 tablet 3     VENTOLIN  (90 BASE) MCG/ACT Inhaler INHALE 2 PUFFS INTO THE LUNGS EVERY 4 HOURS AS NEEDED FOR SHORTNESS OF BREATH OR DIFFICULT BREATHING OR WHEEZING 18 g 3     order for DME Equipment being ordered: compression stockings. 20-30 mm or 30 - 40 mm as patient can tolerate. Physical therapy to determine if they should be above or below the knee. 2 Units 4     order for DME Equipment being ordered: compression bra 2 Device 1     aluminum chloride (DRYSOL) 20 % external solution Apply topically At Bedtime 60 mL 3     Triamcinolone Acetonide (AZMACORT IN) Inhale 2 puffs into the lungs as needed       Probiotic Product (PROBIOTIC DAILY PO) Take 1 capsule by mouth daily Lacto acid bifidobacterium       levofloxacin (LEVAQUIN) 250 MG tablet        furosemide (LASIX) 20 MG tablet Take 1 tablet (20 mg) by mouth 2 times daily (Patient not taking: Reported on 2/23/2018) 60 tablet 0     magic mouthwash suspension (diphenhydrAMINE, lidocaine, aluminum-magnesium & simethicone) Swish and spit 10 mLs in mouth every 6 hours as needed for mouth sores (Patient not taking: Reported on 2/23/2018) 360 mL 1     triamcinolone (KENALOG) 0.025 % ointment Apply topically as needed  3     polyethylene glycol (MIRALAX) powder Take 17 g (1 capful) by mouth daily (Patient not taking: Reported on 2/23/2018) 510 g 0      "senna-docusate (SENOKOT-S;PERICOLACE) 8.6-50 MG per tablet Take 1-2 tablets by mouth 2 times daily as needed for constipation (Patient not taking: Reported on 2/23/2018) 60 tablet 0     UNABLE TO FIND Take 2 capsules by mouth 3 times daily Muscle Mag. 2 caps contain B1 20mg, B2 20mg, B6 10mg, magesium 20mg, manganese 2mg.       COMPOUND CONTAINING CONTROLLED SUBSTANCE (CMPD RX) - PHARMACY TO MIX COMPOUNDED MEDICATION Apply small amount to affected area two times daily. (Patient not taking: Reported on 2/23/2018) 120 g 6     EPINEPHrine (EPIPEN 2-CARIDAD) 0.3 MG/0.3ML injection Inject 0.3 mLs (0.3 mg) into the muscle once as needed for anaphylaxis (Patient not taking: Reported on 2/23/2018) 0.6 mL 3     UNABLE TO FIND 3 tablets 3 times daily MEDICATION NAME: calcium D-Glucarate   3 caps contain 180mg of elemental calcium.       UNABLE TO FIND 2 tablets 3 times daily MEDICATION NAME: Digestzymes        UNABLE TO FIND 1 tablet daily MEDICATION NAME: Pure Encapsulations         Physical Examination:  General: The patient is a pleasant female in no acute distress.  /75 (BP Location: Right arm, Patient Position: Sitting, Cuff Size: Adult Regular)  Pulse 98  Temp 97.9  F (36.6  C) (Oral)  Resp 16  Ht 1.651 m (5' 5\")  Wt 89.1 kg (196 lb 8 oz)  SpO2 99%  BMI 32.7 kg/m2  Wt Readings from Last 10 Encounters:   02/23/18 89.1 kg (196 lb 8 oz)   02/01/18 86.9 kg (191 lb 8 oz)   01/29/18 81.1 kg (178 lb 14.4 oz)   01/24/18 86.3 kg (190 lb 4.8 oz)   01/22/18 87.2 kg (192 lb 3.9 oz)   01/18/18 88.2 kg (194 lb 8 oz)   01/15/18 86.1 kg (189 lb 14.4 oz)   01/09/18 82.3 kg (181 lb 8 oz)   01/03/18 85.7 kg (189 lb)   01/01/18 87.5 kg (193 lb)     Heart: Regular rate and rhythm.   Lungs: Clear to auscultation bilaterally.   Abdomen: Bowel sounds present, soft, nontender with no palpable hepatosplenomegaly or masses.   Extremities: 2+ pitting edema of lower extremities. Some edema to mid-thighs No skin breakdown on extremities or " "cyanosis of toes. Exfoliating skin on soles of feet.  Neuro: Cranial nerves II through XII are grossly intact. Grossly non-focal  Skin: No rashes, petechiae, or bruising noted on exposed skin.    Laboratory Data:  Results for IMMANUEL LOVETT (MRN [id number]) as of 2/23/2018 18:09   2/23/2018 16:58   Sodium 140   Potassium 3.9   Chloride 105   Carbon Dioxide 27   Urea Nitrogen 12   Creatinine 0.78   GFR Estimate 76   GFR Estimate If Black >90   Calcium 8.4 (L)   Anion Gap 8   Glucose 144 (H)     Results for IMMANUEL LOVETT (MRN [id number]) as of 2/23/2018 18:09   2/23/2018 16:23   WBC 4.4   Hemoglobin 10.8 (L)   Hematocrit 32.9 (L)   Platelet Count 182   RBC Count 3.18 (L)    (H)   MCH 34.0 (H)   MCHC 32.8   RDW 16.9 (H)   Diff Method Automated Method   % Neutrophils 66.0   % Lymphocytes 12.2   % Monocytes 15.6   % Eosinophils 4.8   % Basophils 0.9   % Immature Granulocytes 0.5   Nucleated RBCs 0   Absolute Neutrophil 2.9   Absolute Lymphocytes 0.5 (L)   Absolute Monocytes 0.7   Absolute Eosinophils 0.2   Absolute Basophils 0.0   Abs Immature Granulocytes 0.0   Absolute Nucleated RBC 0.0       Assessment and Plan:    BLE Edema:   Unclear why she has ongoing edema, as her last chemotherapy was 1/18-1/22. Echo on 2/16 with EF 60-65%. UA neg for protein, random urine protein WNL. She has had negative US for DVT. She has some mild hypoalbuminemia with albumin 3.0.  --Increase lasix 20 mg to BID. Increase potassium 20 mEq back to BID with increased lasix dosing  -- Continue HCTZ, as this medication was started by Dr. Castro to help reduce urinary calcium excretion.   --Continue wearing compression stockings. Will request lymphedema therapy appointment again      DLBCL, \"double-hit\"-like, s/p 6 cycles DA-R-EPOCH  -- CR on PET/CT after 4.    --PET/CT on 2/27 and Dr. Sauceda on 2/28    Heme: WBC 4.4 ANC 2.9, Hgb 10.8, platelets 182k.      ID: No active concerns. No longer neutropenic  Continue ppx " ACV 400mg BID.     History of stage 1, ER/AR+, HER2- breast cancer s/p bilateral mastectomies and BENJIE/BSO  Follows with Dr. Crawley. Oncotype recurrence score 11%. Was initially on Arimidex (1834-2409) but changed to Tamoxifen due to arthralgias. Tamoxifen held since 10/5 and continue to hold with chemotherapy. Last f/u Dr. Crawley was on 11/27/17.     History of papillary thyroid cancer, s/p total thyroidectomy  Follows with Dr. Castro. Has post surgical hypothyroidism. Continue levothyroxine and calcitriol as prescribed. Neck u/s on 11/2 shows no evidence of disease.  --TSH 0.01 but T4 WNL on 2/16.      History of Rachael en Y gastric bypass  Continue supplements (calcium citrate-vitamin D, vitamin D3, magnesium citrate). Also has iron deficiency anemia likely from poor absorption and per Dr. Sauceda, she should continue 325mg PO ferrous sulfate TID.      Chronic chest wall pain s/p mastectomies  Follows with Dr. Benitez. Palliative care to continue to prescribe pain medications. Taking MS Contin 30/30/60 mg tid and oxycodone prn, currently 15 mg qid for breakthrough pain, and celebrex.     Nausea. Mild. No vomiting. Continue Zofran, compazine prn.     Constipation.  Under control with Colace, fiber tabs, and MiraLax when needed.     Selina Elizondo PA-C  [facility]  [address] [place] [address]  [phone]

## 2018-02-23 NOTE — PROGRESS NOTES
"Oncology/Hematology Visit Note  Feb 23, 2018    Reason for Visit: follow up of DLBCL s/p C6 DA R-EPOCH. 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 She is currently undergoing treatment with DA-R-EPOCH. Her past medical history is significant for breast cancer in 2011 s/p bilateral mastectomies and BENJIE/BSO up until recently on Tamoxifen, papillary thyroid cancer s/p total thyroidectomy, chronic chest wall pain, and asthma. Briefly, her oncologic history is as follows:     She presented in 8/2017 with dramatically worse chest and back pain. CT 9/1/17 showed lytic lesion right 10th rib extending to right T9-10 neural foramen with vertebral body invasion. There was associated conglomerate of lymphadenopathy around the mid T-spine. She had a CT guided biopsy showing B-cell high grade lymphoma. Pathology showed \"The morphology shows a population of large cells, diffuse lymphoid infiltrate. The cells are atypical with abundant mitotic and apoptotic activity and single cell necrosis. Tumor is CD45 and CD79a positive and focally weakly CD30 positive. The other stains revealed positivity for CD20, BCL6, BCL2 and MUM1, and CD10 and CD21 negative. The CD5 and CD3 highlighted background T-cells.  MYC expression was equivocal with approximately 40% nuclei staining.  Ki-67 proliferative index is greater than 90-95%. The immunohistochemistry is suggestive of non-GCB immunophenotype of diffuse large B-cell lymphoma. The cytogenetics did not show rearrangement of the BCL2 or c-MYC. There is the presence of BCL6 rearrangement in 78% of cells and gains of MYC and BCL2, but no amplifications.\"      Staging LP and BMBx were negative for lymphoma. She started DA-REPOCH 10/6/17. She did well with chemo other than rigors during CIVI. D/c 10/11/17. Given Neulasta 10/12/17. Post cycle 1 complicated by mucositis and worsening of " chronic LE peripheral edema. She began cycle 2 on 10/27/17. Due to a rise in her LD and uric acid levels on that admission day, she underwent a CT scan which showed response to therapy with improvement in her mediastinal lymphadenopathy and right chest wall mass. She had a CR following 4 cycles on PET/CT 12/21.     Interval History:  She took lasix BID x 3 days with some improvement, then cut back to once daily but leg swelling increased again. She denies any dyspnea. She took potassium as directed. Her weight is up 5 lbs in past 3 weeks. She is frustrated with the discomfort of the leg swelling. She has some peeling skin on soles of feet, but no open sores.     Current Outpatient Prescriptions   Medication Sig Dispense Refill     [START ON 2/24/2018] morphine (MS CONTIN) 30 MG 12 hr tablet Take 1 tablet (30 mg) by mouth every 8 hours . Take an addition pill at night such that your evening dose is 60mg 120 tablet 0     [START ON 2/24/2018] oxyCODONE IR (ROXICODONE) 30 MG tablet Take 1 tablet (30 mg) by mouth every 4 hours as needed for moderate to severe pain 180 tablet 0     acyclovir (ZOVIRAX) 400 MG tablet Take 1 tablet (400 mg) by mouth 2 times daily 60 tablet 0     furosemide (LASIX) 20 MG tablet Take 20 mg (1 tablet) twice a day for 3 days, then take 1 tablet daily 30 tablet 0     potassium chloride SA (KLOR-CON) 20 MEQ CR tablet Take 1 tablet (20 mEq) by mouth 2 times daily 30 tablet 0     diazepam (VALIUM) 5 MG tablet TAKE ONE TABLET BY MOUTH THREE TIMES DAILY AS NEEDED FOR MUSCLE SPASMS 90 tablet 1     cromolyn sodium (NASALCROM) 5.2 MG/ACT AERS Inhaler SPRAY ONE SPRAY( 1 ML) IN NOSTRIL DAILY 1 Bottle 6     lidocaine-prilocaine (EMLA) cream Apply topically as needed (port access pain.) 30 g 1     [employer] FIBER GUMMY BEARS CHILDREN CHEW Take 5 g by mouth daily (2 gummy= 5 g =1 serving) 120 tablet 3     methocarbamol (ROBAXIN) 750 MG tablet Take 1 tablet (750 mg) by mouth 4 times daily . Ok to take a 5th Robaxin  on very severe days.       diclofenac (VOLTAREN) 1 % GEL topical gel Apply affected area two times daily PRN using enclosed dosing card. 100 g 0     celecoxib (CELEBREX) 200 MG capsule Take 1 capsule (200 mg) by mouth 2 times daily 56 capsule 0     ondansetron (ZOFRAN-ODT) 8 MG ODT tab Take 1 tablet (8 mg) by mouth every 8 hours as needed (Patient taking differently: Take 8 mg by mouth every 8 hours ) 30 tablet 1     cetirizine (ZYRTEC ALLERGY) 10 MG tablet Take 1 tablet (10 mg) by mouth 3 times daily On hold for lab test. 90 tablet 0     hydrochlorothiazide (MICROZIDE) 12.5 MG capsule Take 1 capsule (12.5 mg) by mouth daily 30 capsule 0     calcium citrate-vitamin D (CALCIUM CITRATE +D) 315-250 MG-UNIT TABS per tablet Take 2 tablets by mouth twice a week 20 tablet 0     cyclobenzaprine (FLEXERIL) 10 MG tablet Take 1 tablet (10 mg) by mouth 3 times daily as needed (Patient taking differently: Take 10 mg by mouth 3 times daily ) 30 tablet 0     ranitidine (ZANTAC) 75 MG tablet Take 1 tablet (75 mg) by mouth 3 times daily 90 tablet 0     Cholecalciferol (VITAMIN D3) 2000 UNITS CAPS Take 5,000 Units by mouth daily Takes 2 tabs daily 60 capsule 0     calcitRIOL (ROCALTROL) 0.25 MCG capsule TAKE 2 CAPSULES BY MOUTH EVERY MORNING AND TAKE 1 CAPSULE BY MOUTH EVERY NIGHT AT BEDTIME (Patient taking differently: Take 0.25 mcg by mouth 2 times daily TAKE 2 CAPSULES BY MOUTH EVERY MORNING AND TAKE 1 CAPSULE BY MOUTH EVERY NIGHT AT NOON.) 90 capsule 0     cromolyn (OPTICROM) 4 % ophthalmic solution Place 1 drop into both eyes 4 times daily 10 mL 0     montelukast (SINGULAIR) 10 MG tablet Take 2 tablets (20 mg) by mouth 2 times daily 60 tablet 0     prochlorperazine (COMPAZINE) 10 MG tablet Take 10 mg by mouth as needed  3     SUMAtriptan (IMITREX) 50 MG tablet Take 50 mg by mouth as needed  3     acetaminophen (TYLENOL) 325 MG tablet Take 2 tablets (650 mg) by mouth every 4 hours as needed for mild pain or fever 100 tablet       lidocaine (XYLOCAINE) 5 % ointment Apply quarter size amount to chest and back up to 3 times daily as needed for pain. 350 g 1     bisacodyl (DULCOLAX) 5 MG EC tablet Take 3 tablets (15 mg) by mouth daily as needed for constipation 30 tablet 0     order for DME Compression stockings, medium grade, please measure and fit. Please dispense a pair. 1 Units 0     levothyroxine (SYNTHROID/LEVOTHROID) 112 MCG tablet Mon-Sat: (2 tabs daily) Sunday: 3 tabs (Patient taking differently: 112 mcg Mon-Sat: (2 tabs daily) Sunday: 3 tabs) 189 tablet 3     VENTOLIN  (90 BASE) MCG/ACT Inhaler INHALE 2 PUFFS INTO THE LUNGS EVERY 4 HOURS AS NEEDED FOR SHORTNESS OF BREATH OR DIFFICULT BREATHING OR WHEEZING 18 g 3     order for DME Equipment being ordered: compression stockings. 20-30 mm or 30 - 40 mm as patient can tolerate. Physical therapy to determine if they should be above or below the knee. 2 Units 4     order for DME Equipment being ordered: compression bra 2 Device 1     aluminum chloride (DRYSOL) 20 % external solution Apply topically At Bedtime 60 mL 3     Triamcinolone Acetonide (AZMACORT IN) Inhale 2 puffs into the lungs as needed       Probiotic Product (PROBIOTIC DAILY PO) Take 1 capsule by mouth daily Lacto acid bifidobacterium       levofloxacin (LEVAQUIN) 250 MG tablet        furosemide (LASIX) 20 MG tablet Take 1 tablet (20 mg) by mouth 2 times daily (Patient not taking: Reported on 2/23/2018) 60 tablet 0     magic mouthwash suspension (diphenhydrAMINE, lidocaine, aluminum-magnesium & simethicone) Swish and spit 10 mLs in mouth every 6 hours as needed for mouth sores (Patient not taking: Reported on 2/23/2018) 360 mL 1     triamcinolone (KENALOG) 0.025 % ointment Apply topically as needed  3     polyethylene glycol (MIRALAX) powder Take 17 g (1 capful) by mouth daily (Patient not taking: Reported on 2/23/2018) 510 g 0     senna-docusate (SENOKOT-S;PERICOLACE) 8.6-50 MG per tablet Take 1-2 tablets by mouth 2  "times daily as needed for constipation (Patient not taking: Reported on 2/23/2018) 60 tablet 0     UNABLE TO FIND Take 2 capsules by mouth 3 times daily Muscle Mag. 2 caps contain B1 20mg, B2 20mg, B6 10mg, magesium 20mg, manganese 2mg.       COMPOUND CONTAINING CONTROLLED SUBSTANCE (CMPD RX) - PHARMACY TO MIX COMPOUNDED MEDICATION Apply small amount to affected area two times daily. (Patient not taking: Reported on 2/23/2018) 120 g 6     EPINEPHrine (EPIPEN 2-CARIDAD) 0.3 MG/0.3ML injection Inject 0.3 mLs (0.3 mg) into the muscle once as needed for anaphylaxis (Patient not taking: Reported on 2/23/2018) 0.6 mL 3     UNABLE TO FIND 3 tablets 3 times daily MEDICATION NAME: calcium D-Glucarate   3 caps contain 180mg of elemental calcium.       UNABLE TO FIND 2 tablets 3 times daily MEDICATION NAME: Digestzymes        UNABLE TO FIND 1 tablet daily MEDICATION NAME: Pure Encapsulations         Physical Examination:  General: The patient is a pleasant female in no acute distress.  /75 (BP Location: Right arm, Patient Position: Sitting, Cuff Size: Adult Regular)  Pulse 98  Temp 97.9  F (36.6  C) (Oral)  Resp 16  Ht 1.651 m (5' 5\")  Wt 89.1 kg (196 lb 8 oz)  SpO2 99%  BMI 32.7 kg/m2  Wt Readings from Last 10 Encounters:   02/23/18 89.1 kg (196 lb 8 oz)   02/01/18 86.9 kg (191 lb 8 oz)   01/29/18 81.1 kg (178 lb 14.4 oz)   01/24/18 86.3 kg (190 lb 4.8 oz)   01/22/18 87.2 kg (192 lb 3.9 oz)   01/18/18 88.2 kg (194 lb 8 oz)   01/15/18 86.1 kg (189 lb 14.4 oz)   01/09/18 82.3 kg (181 lb 8 oz)   01/03/18 85.7 kg (189 lb)   01/01/18 87.5 kg (193 lb)     Heart: Regular rate and rhythm.   Lungs: Clear to auscultation bilaterally.   Abdomen: Bowel sounds present, soft, nontender with no palpable hepatosplenomegaly or masses.   Extremities: 2+ pitting edema of lower extremities. Some edema to mid-thighs No skin breakdown on extremities or cyanosis of toes. Exfoliating skin on soles of feet.  Neuro: Cranial nerves II through " "XII are grossly intact. Grossly non-focal  Skin: No rashes, petechiae, or bruising noted on exposed skin.    Laboratory Data:  Results for IMMANUEL LOVETT (MRN [id number]) as of 2/23/2018 18:09   2/23/2018 16:58   Sodium 140   Potassium 3.9   Chloride 105   Carbon Dioxide 27   Urea Nitrogen 12   Creatinine 0.78   GFR Estimate 76   GFR Estimate If Black >90   Calcium 8.4 (L)   Anion Gap 8   Glucose 144 (H)     Results for IMMANUEL LOVETT (MRN [id number]) as of 2/23/2018 18:09   2/23/2018 16:23   WBC 4.4   Hemoglobin 10.8 (L)   Hematocrit 32.9 (L)   Platelet Count 182   RBC Count 3.18 (L)    (H)   MCH 34.0 (H)   MCHC 32.8   RDW 16.9 (H)   Diff Method Automated Method   % Neutrophils 66.0   % Lymphocytes 12.2   % Monocytes 15.6   % Eosinophils 4.8   % Basophils 0.9   % Immature Granulocytes 0.5   Nucleated RBCs 0   Absolute Neutrophil 2.9   Absolute Lymphocytes 0.5 (L)   Absolute Monocytes 0.7   Absolute Eosinophils 0.2   Absolute Basophils 0.0   Abs Immature Granulocytes 0.0   Absolute Nucleated RBC 0.0       Assessment and Plan:    BLE Edema:   Unclear why she has ongoing edema, as her last chemotherapy was 1/18-1/22. Echo on 2/16 with EF 60-65%. UA neg for protein, random urine protein WNL. She has had negative US for DVT. She has some mild hypoalbuminemia with albumin 3.0.  --Increase lasix 20 mg to BID. Increase potassium 20 mEq back to BID with increased lasix dosing  -- Continue HCTZ, as this medication was started by Dr. Castro to help reduce urinary calcium excretion.   --Continue wearing compression stockings. Will request lymphedema therapy appointment again      DLBCL, \"double-hit\"-like, s/p 6 cycles DA-R-EPOCH  -- CR on PET/CT after 4.    --PET/CT on 2/27 and Dr. Sauceda on 2/28    Heme: WBC 4.4 ANC 2.9, Hgb 10.8, platelets 182k.      ID: No active concerns. No longer neutropenic  Continue ppx ACV 400mg BID.     History of stage 1, ER/VA+, HER2- breast cancer s/p bilateral " mastectomies and BENJIE/BSO  Follows with Dr. Crawley. Oncotype recurrence score 11%. Was initially on Arimidex ([id number]) but changed to Tamoxifen due to arthralgias. Tamoxifen held since 10/5 and continue to hold with chemotherapy. Last f/u Dr. Crawley was on 11/27/17.     History of papillary thyroid cancer, s/p total thyroidectomy  Follows with Dr. Castro. Has post surgical hypothyroidism. Continue levothyroxine and calcitriol as prescribed. Neck u/s on 11/2 shows no evidence of disease.  --TSH 0.01 but T4 WNL on 2/16.      History of Rachael en Y gastric bypass  Continue supplements (calcium citrate-vitamin D, vitamin D3, magnesium citrate). Also has iron deficiency anemia likely from poor absorption and per Dr. Sauceda, she should continue 325mg PO ferrous sulfate TID.      Chronic chest wall pain s/p mastectomies  Follows with Dr. Benitez. Palliative care to continue to prescribe pain medications. Taking MS Contin 30/30/60 mg tid and oxycodone prn, currently 15 mg qid for breakthrough pain, and celebrex.     Nausea. Mild. No vomiting. Continue Zofran, compazine prn.     Constipation.  Under control with Colace, fiber tabs, and MiraLax when needed.     Selina Elizondo PA-C  [facility]  [address] [place] [address] [phone]

## 2018-02-27 ENCOUNTER — HOSPITAL ENCOUNTER (OUTPATIENT)
Dept: PET IMAGING | Facility: CLINIC | Age: 58
Discharge: HOME | End: 2018-02-27
Payer: COMMERCIAL

## 2018-02-27 DIAGNOSIS — C85.10 HIGH GRADE B-CELL LYMPHOMA (H): ICD-10-CM

## 2018-02-27 LAB — GLUCOSE BLDC GLUCOMTR-MCNC: 102 MG/DL (ref 70–99)

## 2018-02-27 PROCEDURE — 34300033 ZZH RX 343

## 2018-02-27 PROCEDURE — 25000128 H RX IP 250 OP 636

## 2018-02-27 PROCEDURE — 78816 PET IMAGE W/CT FULL BODY: CPT | Mod: PS

## 2018-02-27 PROCEDURE — A9552 F18 FDG: HCPCS

## 2018-02-27 PROCEDURE — 82962 GLUCOSE BLOOD TEST: CPT

## 2018-02-27 RX ORDER — HEPARIN SODIUM (PORCINE) LOCK FLUSH IV SOLN 100 UNIT/ML 100 UNIT/ML
500 SOLUTION INTRAVENOUS ONCE
Status: COMPLETED | OUTPATIENT
Start: 2018-02-27 | End: 2018-02-27

## 2018-02-27 RX ADMIN — FLUDEOXYGLUCOSE F-18 12 MCI.: 500 INJECTION, SOLUTION INTRAVENOUS at 06:55

## 2018-02-27 RX ADMIN — SODIUM CHLORIDE, PRESERVATIVE FREE 500 UNITS: 5 INJECTION INTRAVENOUS at 06:55

## 2018-02-28 ENCOUNTER — OFFICE VISIT (OUTPATIENT)
Dept: PALLIATIVE CARE | Facility: CLINIC | Age: 58
End: 2018-02-28
Attending: INTERNAL MEDICINE
Payer: COMMERCIAL

## 2018-02-28 ENCOUNTER — OFFICE VISIT (OUTPATIENT)
Dept: TRANSPLANT | Facility: CLINIC | Age: 58
End: 2018-02-28
Payer: COMMERCIAL

## 2018-02-28 VITALS
HEART RATE: 66 BPM | DIASTOLIC BLOOD PRESSURE: 61 MMHG | OXYGEN SATURATION: 96 % | RESPIRATION RATE: 16 BRPM | TEMPERATURE: 97.2 F | SYSTOLIC BLOOD PRESSURE: 118 MMHG

## 2018-02-28 DIAGNOSIS — C83.398 DIFFUSE LARGE B-CELL LYMPHOMA OF EXTRANODAL SITE: Primary | ICD-10-CM

## 2018-02-28 DIAGNOSIS — C85.10 HIGH GRADE B-CELL LYMPHOMA (H): Primary | ICD-10-CM

## 2018-02-28 DIAGNOSIS — R07.89 CHEST WALL PAIN: ICD-10-CM

## 2018-02-28 DIAGNOSIS — C83.30 DIFFUSE LARGE B CELL LYMPHOMA (H): Primary | ICD-10-CM

## 2018-02-28 PROCEDURE — 99214 OFFICE O/P EST MOD 30 MIN: CPT | Mod: ZP | Performed by: INTERNAL MEDICINE

## 2018-02-28 PROCEDURE — G0463 HOSPITAL OUTPT CLINIC VISIT: HCPCS | Mod: ZF

## 2018-02-28 ASSESSMENT — PAIN SCALES - GENERAL: PAINLEVEL: EXTREME PAIN (8)

## 2018-02-28 NOTE — LETTER
2/28/2018       RE: Tisha Arias  [address] [place] [address]     Dear Colleague,    Thank you for referring your patient, Tisha Arias, to the [facility]. Please see a copy of my visit note below.    Palliative Staff/Outpatient Clinic    (This note was transcribed using voice recognition software. While I review and edit the transcription, I may miss errors. Also, the software capitalizes words strangely sometimes. Please let me know of any serious mis-transcriptions and I will addend this note.)    CC/Patient ID:  Patient ID: she has long-standing chronic complex myofascial chest wall pain after breast cancer surgery and reconstruction   Saw Dr England (at my insistence) earlier in 2016 for a 2nd opinion - he recommended modest opioid increases which we did and it only seemed to improve her situation temporarily  Mood disturbance in this context   Follows closely also with acupuncture, massage, & psychotherapy.   Is diagnosed also with MCAS and was followed by Dr Avendaño.  Thyroid cancer s/p resection, ablations.  Sept 2017 - she developed escalating sternal and back pain and had a CT in ED, then PET showing R 10th rib,T10 mass, mediastinal and hilar lymphadenopathy -->  High grade B cell lymphoma. Feb 2018 in complete radiographic remission. Opioids increased in that time to treat tumor related pain.     History:  She is seen alone today.  This is the first time I am seeing her since September-our inpatient team saw her 2 months ago during 1 of her scheduled chemotherapy admissions.  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    She continues to report her pain as terrible.  There was a sense her pain escalated prior to being diagnosed with her lymphoma last fall and I asked  if any of that pain got better and she said she is having stomach/epigastric area pain which did improve with her treatment but her other pains have not markedly improved.  In addition to that she now has significant bilateral lower extremity edema which she finds painful.  She is taking diuretics and using compression stockings.  She continues to have a bandlike squeezing chest pain in her chest.  She is on 120 mg a day of MS Contin and 30 mg of oxycodone as needed, about 6 a day.  She continues on diazepam for the chest wall spasms.  She uses diclofenac gel and lidocaine ointment as well as celecoxib orally.  Multiple other medicines that she is tried over the years have not been helpful.     She reports her mood and spirit is adequate.  She is sleeping okay.  Her energy is low but she is able to work.  She talks about being frustrated with having another cancer and not being up to escape her many, very morbid, medical problems.      SH:   Reviewed and unchanged    ROS:  Comprehensive review of systems is negative otherwise    PE: Vitals reviewed   alert pleasant healthy-appearing woman in no acute distress.  Diffuse alopecia.  Bilateral lower extremities have marked edema.  Neuropsych exam is pertinent for a flat affect, clear sensorium, fluent speech, memory thought processes thought content and fund of knowledge are all normal.    Impression & Recommendations:  57-year-old woman with complex chronic chest wall pain after breast cancer treatment.  Pain evolve last fall due to high-grade B-cell lymphoma next to her spine which is now completely radiographically resolved.  In this context her opioids were increased-more than doubled.  It is notable that despite this she continues to report daily severe and barely managed pain.  I tried to have a conversation with her about this and I think it is very difficult for her to hear.  I have long been skeptical that opioids are really helping her-she disagrees.  I also  observe that in the context of her lymphoma we liberally increased her pain medicine and her lymphoma is now essentially in complete remission, and she continues to have daily severe pain despite more than doubling her daily opioid dose.  That, to me, is a strong argument to that we need to taper down her opioid doses and that they are not a pathway to recovery or good pain control for her.  Despite the fact that she presents to me saying her pain is terrible I think she disagrees with my assessment otherwise.  She still recovering from treatment and I am not really sure what the ongoing plan is going to be moving forward with her cancer and I am not going to taper her today.  I had like to see her back in 2 months and will taper her then.  I know she really disagrees with me in but I treated her for a long time and feels very clear about this.  She is welcome to seek pain care elsewhere, of course, she lives with a lot of pain and I have not been really able to help that and I appreciate that.    Chart data reviewed today:    Family History   Problem Relation Age of Onset     Allergies Mother      Arthritis Mother      CANCER Mother      uterine/uterine     Hypertension Mother      on HCTZ     Hyperlipidemia Mother      CEREBROVASCULAR DISEASE Mother      s/p brain surgery     Breast Cancer Mother      Depression Mother      Anxiety Disorder Mother      Anesthesia Reaction Mother      Asthma Mother      Skin Cancer Mother      HEART DISEASE Father      DIABETES Father      Coronary Artery Disease Father      Hypertension Father      Hyperlipidemia Father      CEREBROVASCULAR DISEASE Father      Multiple strokes     Obesity Father      DIABETES Brother      Depression Brother      Hypertension Brother      CANCER Maternal Grandfather      pancreatic cancer/stomach cancer     Prostate Cancer Maternal Grandfather      Prostrate and Bladder     Other Cancer Maternal Grandfather      Pancreatic 1988, Bladder 1977      CANCER Maternal Grandmother      uterine     Breast Cancer Maternal Grandmother      Skin Cancer Maternal Grandmother      CANCER Brother 57     pancreatic cancer     DIABETES Brother      Breast Cancer Cousin      Grand mothers sister     Other Cancer Brother      Stage 3 Pancreatic 2-2015     Depression Brother      Asthma Brother      Obesity Brother      Anesthesia Reaction Other      H/a, itching, drug interactions     Asthma Other      CANCER Brother      Pancreatic      Thyroid Disease No family hx of      Past Medical History:   Diagnosis Date     Allergic rhinitis due to animal dander      Asthma      Breast cancer (H) 1/30/12    right     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 No real need for Epipen for this alone.     GDM (gestational diabetes mellitus)     w first pregnancy only     Gestational hypertension      Lymphedema     jackson arms, chest     Migraine      Neuropathy associated with cancer (H)      Papillary carcinoma, follicular variant (H) 6/28/2013    pT3, N1, Mx, thyroid     Post-surgical hypothyroidism      Renal disease     kidney stones     Rhinitis, allergic to other allergen      Right Breast mass and microcalcifications 12/13/2011     Seasonal allergic conjunctivitis      Seasonal allergic rhinitis 4/12/11 skin tests pos. for: dog/M/T/G/W--NEGATIVE FOOD TESTS FOR: shrimp, crab, lobster, coconut     Past Surgical History:   Procedure Laterality Date     BACK SURGERY  2000,    Bulging disc w nerve root impigment     BIOPSY BREAST  1989    right     BIOPSY BREAST  12/13/11    right, core and sterotactic     BONE MARROW BIOPSY, BONE SPECIMEN, NEEDLE/TROCAR Left 10/3/2017    Procedure: BIOPSY BONE MARROW;  Bone Marrow Biopsy with aspirate;  Surgeon: Amelia Watkins PA-C;  Location: [facility] OR     BYPASS GASTRIC,  CHOLECYSTECTOMY, COMBINED       C LAPAROSCOPIC GASTRIC RESTRICTIVE PX, W/GASTRIC BYPASS/ GAMALIEL-EN-Y, < 150CM      Gamaliel en Y?      SECTION       COLONOSCOPY      normal     COSMETIC SURGERY  2012    Final Stage of Mastectomy     DAVINCI HYSTERECTOMY TOTAL, BILATERAL SALPINGO-OOPHORECTOMY, COMBINED  2012    Procedure: COMBINED DAVINCI HYSTERECTOMY TOTAL, SALPINGO-OOPHORECTOMY;  Davinci Total Laparoscopic Hysterectomy, Bilateral Salpingo Oophorectomy, Pelvic Washings, Cystoscopy;  Surgeon: Evie Sheikh MD;  Location: [facility] OR     ENT SURGERY       ESOPHAGOSCOPY, GASTROSCOPY, DUODENOSCOPY (EGD), COMBINED N/A 2017    Procedure: COMBINED ENDOSCOPIC ULTRASOUND, ESOPHAGOSCOPY, GASTROSCOPY, DUODENOSCOPY (EGD), FINE NEEDLE ASPIRATE/BIOPSY;  Esophagogastroduodenoscopy, Endoscopic Ultrasound, with fine needle biopsy aspirate;  Surgeon: Patrick Robles MD;  Location: [facility] OR     GI SURGERY  2007    Gamaliel-en Y w cholecystectomy     GYN SURGERY  89,1/10/92    2 c-sections     HYSTERECTOMY, PAP NO LONGER INDICATED      DeVinci assister lap hyst with BSO     INSERT PORT VASCULAR ACCESS Right 10/4/2017    Procedure: INSERT PORT VASCULAR ACCESS;  Port Placement;  Surgeon: Jc Goodman PA-C;  Location:  OR     IRRIGATION AND DEBRIDEMENT BREAST  3/1/2012    Procedure:IRRIGATION AND DEBRIDEMENT BREAST; Irrigation and Debridement, Wound Closure Right Breast; Surgeon:JUNG GUILLEN; Location:[facility] OR     MASTECTOMY, RECONSTRUCT BREAST, COMBINED  2012    Procedure:COMBINED MASTECTOMY, RECONSTRUCT BREAST; Bilateral Mastectomies, Right Axillary Freeburn Node Biopsy, Bilateral Breast Reconstruction with Tissue Expanders, Reconstruction of inframammary fold, bilateral pain management systems; Surgeon:ANUPAM CAMPBELL; Location:[facility] OR     RECONSTRUCT BREAST  2012    Procedure: RECONSTRUCT BREAST;  Bilateral 2nd stage breast reconstruction, revision,       SOFT  TISSUE SURGERY  1-30-12    Mastectomy w severe myofascial pain syndrome     THYROIDECTOMY  7/10/2013    Procedure: THYROIDECTOMY;  Total Thyroidectomy with central neck dissection;  Surgeon: Indiana Sneed MD;  Location: [facility] OR     TONSILLECTOMY      childhood     Allergies   Allergen Reactions     Baclofen Other (See Comments) and Unknown     Other reaction(s): Edema, chest pain, seizures.      No Clinical Screening - See Comments Shortness Of Breath, Palpitations, Anaphylaxis, Itching, Swelling, Difficulty breathing and Rash     Sukhjinder wipes- oral allergy -  July 2015: throat tightness from a Chinese herbal medicine Wilmer Schrader Blank Barry Tran     Serotonin Anxiety, Other (See Comments) and Swelling     Seizures      Tree Nuts [Nuts] Shortness Of Breath     Amitriptyline Hcl Swelling     Birch Trees      Potatoes, carrots, cherries, celery, apple, pears, plums, peaches, parsnip, kiwi, hazelnuts, and apricots,      Blue Dyes Itching     Headaches       Cymbalta Other (See Comments)     Flushing, tremor/muscle twitching and edema     Gabapentin Other (See Comments)     edema  Systemic edema, weaned off from Feb to March per Dr. Dowd.    edema     Grass      Mugwort [Artemisia Vulgaris]      Various spices     Ragweeds      Melons, bananas, cucumbers, zucchini.     Topamax      Nortriptyline Itching, Visual Disturbance, Swelling, GI Disturbance, Anxiety, Other (See Comments) and Nausea     Other reaction(s): Swelling          Medications:   I have reviewed this patient's medication profile.  MNPMP: reviewed      Data reviewed:  I reviewed recent labs and imaging, comments on pertinent findings:  Cr 0.78  Ca 8.4    PET 2/27  IMPRESSION: In this patient with history of diffuse large B-cell  lymphoma, breast cancer, and papillary thyroid cancer:  1. Minimal residual FDG uptake (Deauville 3) in the right 10th rib  bearing paravertebral soft tissue lesion. According to [place]  criteria: Complete response. No recurrent  soft tissue lesion or  evidence of progressive osseous involvement.  2. No new lesions concerning for recurrence or metastases.       Thank you for involving us in the patient's care.   Edu Benitez MD / Palliative Medicine / Pager [phone] / [facility] Inpatient Team Consult Pager [phone] (answered 8am-430pm M-F) - ok to text page via [employer] / After-Hours Answering Service [phone] / Palliative Clinic in the [facility] the [facility] - [phone] (scheduling); [phone] (triage).        Again, thank you for allowing me to participate in the care of your patient.      Sincerely,    Edu Benitez MD

## 2018-02-28 NOTE — NURSING NOTE
"Oncology Rooming Note    February 28, 2018 3:33 PM   Tisha Arias is a 57 year old female who presents for:    Chief Complaint   Patient presents with     RECHECK     pt with lymphoma here for labs and md visit     Initial Vitals: /61  Pulse 66  Temp 97.2  F (36.2  C)  Resp 16  SpO2 96% Estimated body mass index is 32.7 kg/(m^2) as calculated from the following:    Height as of 2/23/18: 1.651 m (5' 5\").    Weight as of 2/23/18: 89.1 kg (196 lb 8 oz). There is no height or weight on file to calculate BSA.  Extreme Pain (8) Comment: chest and legs   No LMP recorded. Patient has had a hysterectomy.  Allergies reviewed: No  Medications reviewed: No    Pharmacy name entered into [employer]: [id number] - [place], [place] - [address] [address] [place]    Clinical concerns: md will review meds and allergies, labs not needed per Sohail sibley     3 minutes for nursing intake (face to face time)     Lyla Ortiz RN              "

## 2018-02-28 NOTE — PROGRESS NOTES
Palliative Staff/Outpatient Clinic    (This note was transcribed using voice recognition software. While I review and edit the transcription, I may miss errors. Also, the software capitalizes words strangely sometimes. Please let me know of any serious mis-transcriptions and I will addend this note.)    CC/Patient ID:  Patient ID: she has long-standing chronic complex myofascial chest wall pain after breast cancer surgery and reconstruction   Saw Dr England (at my insistence) earlier in 2016 for a 2nd opinion - he recommended modest opioid increases which we did and it only seemed to improve her situation temporarily  Mood disturbance in this context   Follows closely also with acupuncture, massage, & psychotherapy.   Is diagnosed also with MCAS and was followed by Dr Avendaño.  Thyroid cancer s/p resection, ablations.  Sept 2017 - she developed escalating sternal and back pain and had a CT in ED, then PET showing R 10th rib,T10 mass, mediastinal and hilar lymphadenopathy -->  High grade B cell lymphoma. Feb 2018 in complete radiographic remission. Opioids increased in that time to treat tumor related pain.     History:  She is seen alone today.  This is the first time I am seeing her since September-our inpatient team saw her 2 months ago during 1 of her scheduled chemotherapy admissions.  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    She continues to report her pain as terrible.  There was a sense her pain escalated prior to being diagnosed with her lymphoma last fall and I asked if any of that pain got better and she said she is having stomach/epigastric area pain which did improve with her treatment but her other pains have not markedly improved.  In addition to that she now has significant bilateral lower extremity edema which she finds painful.  She is taking diuretics  and using compression stockings.  She continues to have a bandlike squeezing chest pain in her chest.  She is on 120 mg a day of MS Contin and 30 mg of oxycodone as needed, about 6 a day.  She continues on diazepam for the chest wall spasms.  She uses diclofenac gel and lidocaine ointment as well as celecoxib orally.  Multiple other medicines that she is tried over the years have not been helpful.     She reports her mood and spirit is adequate.  She is sleeping okay.  Her energy is low but she is able to work.  She talks about being frustrated with having another cancer and not being up to escape her many, very morbid, medical problems.      SH:   Reviewed and unchanged    ROS:  Comprehensive review of systems is negative otherwise    PE: Vitals reviewed   alert pleasant healthy-appearing woman in no acute distress.  Diffuse alopecia.  Bilateral lower extremities have marked edema.  Neuropsych exam is pertinent for a flat affect, clear sensorium, fluent speech, memory thought processes thought content and fund of knowledge are all normal.    Impression & Recommendations:  57-year-old woman with complex chronic chest wall pain after breast cancer treatment.  Pain evolve last fall due to high-grade B-cell lymphoma next to her spine which is now completely radiographically resolved.  In this context her opioids were increased-more than doubled.  It is notable that despite this she continues to report daily severe and barely managed pain.  I tried to have a conversation with her about this and I think it is very difficult for her to hear.  I have long been skeptical that opioids are really helping her-she disagrees.  I also observe that in the context of her lymphoma we liberally increased her pain medicine and her lymphoma is now essentially in complete remission, and she continues to have daily severe pain despite more than doubling her daily opioid dose.  That, to me, is a strong argument to that we need to taper  down her opioid doses and that they are not a pathway to recovery or good pain control for her.  Despite the fact that she presents to me saying her pain is terrible I think she disagrees with my assessment otherwise.  She still recovering from treatment and I am not really sure what the ongoing plan is going to be moving forward with her cancer and I am not going to taper her today.  I had like to see her back in 2 months and will taper her then.  I know she really disagrees with me in but I treated her for a long time and feels very clear about this.  She is welcome to seek pain care elsewhere, of course, she lives with a lot of pain and I have not been really able to help that and I appreciate that.    Chart data reviewed today:    Family History   Problem Relation Age of Onset     Allergies Mother      Arthritis Mother      CANCER Mother      uterine/uterine     Hypertension Mother      on HCTZ     Hyperlipidemia Mother      CEREBROVASCULAR DISEASE Mother      s/p brain surgery     Breast Cancer Mother      Depression Mother      Anxiety Disorder Mother      Anesthesia Reaction Mother      Asthma Mother      Skin Cancer Mother      HEART DISEASE Father      DIABETES Father      Coronary Artery Disease Father      Hypertension Father      Hyperlipidemia Father      CEREBROVASCULAR DISEASE Father      Multiple strokes     Obesity Father      DIABETES Brother      Depression Brother      Hypertension Brother      CANCER Maternal Grandfather      pancreatic cancer/stomach cancer     Prostate Cancer Maternal Grandfather      Prostrate and Bladder     Other Cancer Maternal Grandfather      Pancreatic 1988, Bladder 1977     CANCER Maternal Grandmother      uterine     Breast Cancer Maternal Grandmother      Skin Cancer Maternal Grandmother      CANCER Brother 57     pancreatic cancer     DIABETES Brother      Breast Cancer Cousin      Grand mothers sister     Other Cancer Brother      Stage 3 Pancreatic 2-2015      Depression Brother      Asthma Brother      Obesity Brother      Anesthesia Reaction Other      H/a, itching, drug interactions     Asthma Other      CANCER Brother      Pancreatic      Thyroid Disease No family hx of      Past Medical History:   Diagnosis Date     Allergic rhinitis due to animal dander      Asthma      Breast cancer (H) 12    right     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 No real need for Epipen for this alone.     GDM (gestational diabetes mellitus)     w first pregnancy only     Gestational hypertension      Lymphedema     jackson arms, chest     Migraine      Neuropathy associated with cancer (H)      Papillary carcinoma, follicular variant (H) 2013    pT3, N1, Mx, thyroid     Post-surgical hypothyroidism      Renal disease     kidney stones     Rhinitis, allergic to other allergen      Right Breast mass and microcalcifications 2011     Seasonal allergic conjunctivitis      Seasonal allergic rhinitis 11 skin tests pos. for: dog/M/T/G/W--NEGATIVE FOOD TESTS FOR: shrimp, crab, lobster, coconut     Past Surgical History:   Procedure Laterality Date     BACK SURGERY  ,    Bulging disc w nerve root impigment     BIOPSY BREAST      right     BIOPSY BREAST  11    right, core and sterotactic     BONE MARROW BIOPSY, BONE SPECIMEN, NEEDLE/TROCAR Left 10/3/2017    Procedure: BIOPSY BONE MARROW;  Bone Marrow Biopsy with aspirate;  Surgeon: Amelia Watkins PA-C;  Location: [facility] OR     BYPASS GASTRIC, CHOLECYSTECTOMY, COMBINED       C LAPAROSCOPIC GASTRIC RESTRICTIVE PX, W/GASTRIC BYPASS/ GAMALIEL-EN-Y, < 150CM      Gamaliel en Y?      SECTION       COLONOSCOPY      normal     COSMETIC SURGERY  2012    Final Stage of Mastectomy     DAVINCI HYSTERECTOMY TOTAL, BILATERAL  SALPINGO-OOPHORECTOMY, COMBINED  12/12/2012    Procedure: COMBINED DAVINCI HYSTERECTOMY TOTAL, SALPINGO-OOPHORECTOMY;  Davinci Total Laparoscopic Hysterectomy, Bilateral Salpingo Oophorectomy, Pelvic Washings, Cystoscopy;  Surgeon: Evie Sheikh MD;  Location:  OR     ENT SURGERY  1982     ESOPHAGOSCOPY, GASTROSCOPY, DUODENOSCOPY (EGD), COMBINED N/A 9/14/2017    Procedure: COMBINED ENDOSCOPIC ULTRASOUND, ESOPHAGOSCOPY, GASTROSCOPY, DUODENOSCOPY (EGD), FINE NEEDLE ASPIRATE/BIOPSY;  Esophagogastroduodenoscopy, Endoscopic Ultrasound, with fine needle biopsy aspirate;  Surgeon: Patrick Robles MD;  Location:  OR     GI SURGERY  11-    Rachael-en Y w cholecystectomy     GYN SURGERY  1/6/89,1/10/92    2 c-sections     HYSTERECTOMY, PAP NO LONGER INDICATED  12/12    DeVinci assister lap hyst with BSO     INSERT PORT VASCULAR ACCESS Right 10/4/2017    Procedure: INSERT PORT VASCULAR ACCESS;  Port Placement;  Surgeon: Jc Goodman PA-C;  Location: [facility] OR     IRRIGATION AND DEBRIDEMENT BREAST  3/1/2012    Procedure:IRRIGATION AND DEBRIDEMENT BREAST; Irrigation and Debridement, Wound Closure Right Breast; Surgeon:JUNG GUILLEN; Location: OR     MASTECTOMY, RECONSTRUCT BREAST, COMBINED  1/30/2012    Procedure:COMBINED MASTECTOMY, RECONSTRUCT BREAST; Bilateral Mastectomies, Right Axillary [place] Node Biopsy, Bilateral Breast Reconstruction with Tissue Expanders, Reconstruction of inframammary fold, bilateral pain management systems; Surgeon:ANUPAM CAMPBELL; Location: OR     RECONSTRUCT BREAST  8/31/2012    Procedure: RECONSTRUCT BREAST;  Bilateral 2nd stage breast reconstruction, revision,       SOFT TISSUE SURGERY  1-30-12    Mastectomy w severe myofascial pain syndrome     THYROIDECTOMY  7/10/2013    Procedure: THYROIDECTOMY;  Total Thyroidectomy with central neck dissection;  Surgeon: Indiana Sneed MD;  Location:  OR     TONSILLECTOMY      childhood     Allergies   Allergen  Reactions     Baclofen Other (See Comments) and Unknown     Other reaction(s): Edema, chest pain, seizures.      No Clinical Screening - See Comments Shortness Of Breath, Palpitations, Anaphylaxis, Itching, Swelling, Difficulty breathing and Rash     Sukhjinder wipes- oral allergy -  July 2015: throat tightness from a Chinese herbal medicine Wilmer Barry Tran     Serotonin Anxiety, Other (See Comments) and Swelling     Seizures      Tree Nuts [Nuts] Shortness Of Breath     Amitriptyline Hcl Swelling     Birch Trees      Potatoes, carrots, cherries, celery, apple, pears, plums, peaches, parsnip, kiwi, hazelnuts, and apricots,      Blue Dyes Itching     Headaches       Cymbalta Other (See Comments)     Flushing, tremor/muscle twitching and edema     Gabapentin Other (See Comments)     edema  Systemic edema, weaned off from Feb to March per Dr. Dowd.    edema     Grass      Mugwort [Artemisia Vulgaris]      Various spices     Ragweeds      Melons, bananas, cucumbers, zucchini.     Topamax      Nortriptyline Itching, Visual Disturbance, Swelling, GI Disturbance, Anxiety, Other (See Comments) and Nausea     Other reaction(s): Swelling          Medications:   I have reviewed this patient's medication profile.  MNPMP: reviewed      Data reviewed:  I reviewed recent labs and imaging, comments on pertinent findings:  Cr 0.78  Ca 8.4    PET 2/27  IMPRESSION: In this patient with history of diffuse large B-cell  lymphoma, breast cancer, and papillary thyroid cancer:  1. Minimal residual FDG uptake ([place] 3) in the right 10th rib  bearing paravertebral soft tissue lesion. According to [place]  criteria: Complete response. No recurrent soft tissue lesion or  evidence of progressive osseous involvement.  2. No new lesions concerning for recurrence or metastases.       Thank you for involving us in the patient's care.   Edu Benitez MD / Palliative Medicine / Pager [phone] / [facility] Inpatient Team Consult Pager  [phone] (answered 8am-430pm M-F) - ok to text page via [employer] / After-Hours Answering Service [phone] / Palliative Clinic in the [facility] at the [facility] - [phone] (scheduling); [phone] (triage).

## 2018-02-28 NOTE — MR AVS SNAPSHOT
After Visit Summary   2/28/2018    Tisha Arias    MRN: [id number]           Patient Information     Date Of Birth          1960        Visit Information        Provider Department      2/28/2018 3:30 PM Garima Sauceda MD [facility]        Today's Diagnoses     Diffuse large B-cell lymphoma of extranodal site (H)    -  1          [facility] ([facility])  [address] [place] [id number]  Phone: [phone]  Clinic Hours:   Monday-Thursday:7am to 7pm   Friday: 7am to 5pm   Weekends and holidays:    8am to noon (in general)  If your fever is 100.5  or greater,   call the clinic.  After hours call the   hospital at [phone] or   [phone]. Ask for the BMT   fellow on-call            Follow-ups after your visit        Your next 10 appointments already scheduled     May 21, 2018  4:20 PM CDT   (Arrive by 4:05 PM)   RETURN ENDOCRINE with Avelina Castro MD   [facility] ([facility])    [address] [place] [id number] [phone]            May 21, 2018  5:00 PM CDT   (Arrive by 4:45 PM)   CT CHEST ABDOMEN PELVIS W/O & W CONTRAST with UCCT2   [facility] [place] [facility] ([facility])    [address] [place] [id number] [phone]           Please bring any scans or X-rays taken at other hospitals, if similar tests were done. Also bring a list of your medicines, including vitamins, minerals and over-the-counter drugs. It is safest to leave personal items at home.  Be sure to tell your doctor:   If you have any allergies.   If there s any chance you are pregnant.   If you are breastfeeding.  You may have contrast for this exam. To prepare:   Do not eat or drink for 2 hours before your exam. If you need to take medicine, you may take it with small sips of water. (We may ask you to take liquid medicine as well.)   The  day before your exam, drink extra fluids at least six 8-ounce glasses (unless your doctor tells you to restrict your fluids).   You will be given instructions on how to drink the contrast.  Patients over 70 or patients with diabetes or kidney problems:   If you haven t had a blood test (creatinine test) within the last 30 days, the Cardiologist/Radiologist may require you to get this test prior to your exam.  If you have diabetes:   Continue to take your metformin medication on the day of your exam  Please wear loose clothing, such as a sweat suit or jogging clothes. Avoid snaps, zippers and other metal. We may ask you to undress and put on a hospital gown.  If you have any questions, please call the Imaging Department where you will have your exam.            May 22, 2018  4:30 PM CDT   RETURN ONC with Garima Sauceda MD   [facility] ([facility])    [address] [place] [address]  [address]  [facility] [id number] [phone]            Jun 06, 2018  7:00 AM CDT   (Arrive by 6:45 AM)   Return Visit with Edu Benitez MD   [facility] ([facility])    [address] [place] [address]  [address]  [facility] [id number] [phone]            Jun 11, 2018  4:00 PM CDT   (Arrive by 3:45 PM)   Return Visit with Shahla Crawley MD   [facility] ([facility])    [address] [place] [address]  [address]  [facility] [id number] [phone]              Who to contact     If you have questions or need follow up information about today's clinic visit or your schedule please contact [facility] directly at [phone].  Normal or non-critical lab and imaging results will be communicated to you by MyChart, letter or phone within 4 business days after the clinic has received the results. If you do not hear from us within 7 days, please contact the clinic through  [employer] or phone. If you have a critical or abnormal lab result, we will notify you by phone as soon as possible.  Submit refill requests through [employer] or call your pharmacy and they will forward the refill request to us. Please allow 3 business days for your refill to be completed.          Additional Information About Your Visit        [employer] Information     [employer] gives you secure access to your electronic health record. If you see a primary care provider, you can also send messages to your care team and make appointments. If you have questions, please call your primary care clinic.  If you do not have a primary care provider, please call [phone] and they will assist you.        Care EveryWhere ID     This is your Care EveryWhere ID. This could be used by other organizations to access your [place] medical records  [id number]        Your Vitals Were     Pulse Temperature Respirations Pulse Oximetry          66 97.2  F (36.2  C) 16 96%         Blood Pressure from Last 3 Encounters:   02/28/18 118/61   02/23/18 129/75   02/16/18 109/72    Weight from Last 3 Encounters:   02/23/18 89.1 kg (196 lb 8 oz)   02/01/18 86.9 kg (191 lb 8 oz)   01/29/18 81.1 kg (178 lb 14.4 oz)                 Today's Medication Changes          These changes are accurate as of 2/28/18 11:59 PM.  If you have any questions, ask your nurse or doctor.               These medicines have changed or have updated prescriptions.        Dose/Directions    calcitRIOL 0.25 MCG capsule   Commonly known as:  ROCALTROL   This may have changed:    - how much to take  - how to take this  - when to take this  - additional instructions   Used for:  Postsurgical hypothyroidism, Papillary carcinoma, follicular variant (H), Metastasis to cervical lymph node (H)        TAKE 2 CAPSULES BY MOUTH EVERY MORNING AND TAKE 1 CAPSULE BY MOUTH EVERY NIGHT AT BEDTIME   Quantity:  90 capsule   Refills:  0       cyclobenzaprine 10 MG tablet   Commonly known as:   FLEXERIL   This may have changed:  when to take this   Used for:  High grade B-cell lymphoma (H)        Dose:  10 mg   Take 1 tablet (10 mg) by mouth 3 times daily as needed   Quantity:  30 tablet   Refills:  0       levothyroxine 112 MCG tablet   Commonly known as:  SYNTHROID/LEVOTHROID   This may have changed:    - how much to take  - additional instructions   Used for:  Postsurgical hypothyroidism, Papillary carcinoma, follicular variant (H), Postsurgical hypoparathyroidism (H), Thyroid cancer (H)        Mon-Sat: (2 tabs daily) Sunday: 3 tabs   Quantity:  189 tablet   Refills:  3       ondansetron 8 MG ODT tab   Commonly known as:  ZOFRAN-ODT   This may have changed:  when to take this   Used for:  High grade B-cell lymphoma (H), Nausea and vomiting, intractability of vomiting not specified, unspecified vomiting type        Dose:  8 mg   Take 1 tablet (8 mg) by mouth every 8 hours as needed   Quantity:  30 tablet   Refills:  1         Stop taking these medicines if you haven't already. Please contact your care team if you have questions.     levofloxacin 250 MG tablet   Commonly known as:  LEVAQUIN   Stopped by:  Garima Sauceda MD                    Recent Review Flowsheet Data     BMT Recent Results Latest Ref Rng & Units 2/1/2018 2/1/2018 2/5/2018 2/8/2018 2/16/2018 2/23/2018 2/27/2018    WBC 4.0 - 11.0 10e9/L 5.7 5.9 4.6 3.8(L) 3.2(L) 4.4 -    Hemoglobin 11.7 - 15.7 g/dL 7.8(L) 7.8(L) 9.4(L) 9.6(L) 10.0(L) 10.8(L) -    Platelet Count 150 - 450 10e9/L 46(LL) 48(LL) 136(L) 224 166 182 -    Neutrophils (Absolute) 1.6 - 8.3 10e9/L 4.9 4.7 3.9 2.6 2.0 2.9 -    INR 0.86 - 1.14 - - - - - - -    Sodium 133 - 144 mmol/L - - - - 141 140 -    Potassium 3.4 - 5.3 mmol/L - - - - 3.6 3.9 -    Chloride 94 - 109 mmol/L - - - - 105 105 -    Glucose 70 - 99 mg/dL - - - - 137(H) 144(H) 102(H)    Urea Nitrogen 7 - 30 mg/dL - - - - 12 12 -    Creatinine 0.52 - 1.04 mg/dL - - - - 0.86 0.78 -    Calcium (Total) 8.5 - 10.1  mg/dL - - - - 8.4(L) 8.4(L) -    Protein (Total) 6.8 - 8.8 g/dL - - - - 5.8(L) - -    Albumin 3.4 - 5.0 g/dL - - - - 3.0(L) - -    Alkaline Phosphatase 40 - 150 U/L - - - - 70 - -    AST 0 - 45 U/L - - - - 19 - -    ALT 0 - 50 U/L - - - - 17 - -    MCV 78 - 100 fl 104(H) 101(H) 101(H) 103(H) 104(H) 104(H) -               Primary Care Provider Office Phone # Fax #    Shahla Crawley [phone]       [address] [place] [address]        Equal Access to Services     RODRIGO ZAMORA : Kevin Venegas, wajony ge, timmy lama, ranjan yoder . [facility] [phone].    ATENCIÓN: Si habla español, tiene a stiles disposición servicios gratuitos de asistencia lingüística. [facility] [phone].    We comply with applicable federal civil rights laws and [place] laws. We do not discriminate on the basis of race, color, national origin, age, disability, sex, sexual orientation, or gender identity.            Thank you!     Thank you for choosing [facility]  for your care. Our goal is always to provide you with excellent care. Hearing back from our patients is one way we can continue to improve our services. Please take a few minutes to complete the written survey that you may receive in the mail after your visit with us. Thank you!             Your Updated Medication List - Protect others around you: Learn how to safely use, store and throw away your medicines at www.disposemymeds.org.          This list is accurate as of 2/28/18 11:59 PM.  Always use your most recent med list.                   Brand Name Dispense Instructions for use Diagnosis    acetaminophen 325 MG tablet    TYLENOL    100 tablet    Take 2 tablets (650 mg) by mouth every 4 hours as needed for mild pain or fever    High grade B-cell lymphoma (H)       acyclovir 400 MG tablet    ZOVIRAX    60 tablet    Take 1 tablet (400 mg) by mouth 2 times  daily    High grade B-cell lymphoma (H)       aluminum chloride 20 % external solution    DRYSOL    60 mL    Apply topically At Bedtime    Rash, Intertrigo       AZMACORT IN      Inhale 2 puffs into the lungs as needed        bisacodyl 5 MG EC tablet     30 tablet    Take 3 tablets (15 mg) by mouth daily as needed for constipation    Constipation, unspecified constipation type       calcitRIOL 0.25 MCG capsule    ROCALTROL    90 capsule    TAKE 2 CAPSULES BY MOUTH EVERY MORNING AND TAKE 1 CAPSULE BY MOUTH EVERY NIGHT AT BEDTIME    Postsurgical hypothyroidism, Papillary carcinoma, follicular variant (H), Metastasis to cervical lymph node (H)       calcium citrate-vitamin D 315-250 MG-UNIT Tabs per tablet    CALCIUM CITRATE +D    20 tablet    Take 2 tablets by mouth twice a week    High grade B-cell lymphoma (H), Hypocalcemia       celecoxib 200 MG capsule    celeBREX    56 capsule    Take 1 capsule (200 mg) by mouth 2 times daily    Chest wall pain       cetirizine 10 MG tablet    ZYRTEC ALLERGY    90 tablet    Take 1 tablet (10 mg) by mouth 3 times daily On hold for lab test.    High grade B-cell lymphoma (H)       COMPOUND CONTAINING CONTROLLED SUBSTANCE - PHARMACY TO MIX COMPOUNDED MEDICATION    CMPD RX    120 g    Apply small amount to affected area two times daily.    Neoplasm related pain       cromolyn 4 % ophthalmic solution    OPTICROM    10 mL    Place 1 drop into both eyes 4 times daily    Idiopathic mast cell activation syndrome (H)       cromolyn sodium 5.2 MG/ACT Aers Inhaler    NASALCROM    1 Bottle    SPRAY ONE SPRAY( 1 ML) IN NOSTRIL DAILY    Mast cell disease, systemic       [employer] G[employer] Chew     120 tablet    Take 5 g by mouth daily (2 gummy= 5 g =1 serving)    High grade B-cell lymphoma (H)       cyclobenzaprine 10 MG tablet    FLEXERIL    30 tablet    Take 1 tablet (10 mg) by mouth 3 times daily as needed    High grade B-cell lymphoma (H)       diazepam 5 MG tablet    VALIUM     90 tablet    TAKE ONE TABLET BY MOUTH THREE TIMES DAILY AS NEEDED FOR MUSCLE SPASMS    Chest wall pain       diclofenac 1 % Gel topical gel    VOLTAREN    100 g    Apply affected area two times daily PRN using enclosed dosing card.    Myofascial pain       EPINEPHrine 0.3 MG/0.3ML injection 2-pack    EPIPEN 2-CARIDAD    0.6 mL    Inject 0.3 mLs (0.3 mg) into the muscle once as needed for anaphylaxis        * furosemide 20 MG tablet    LASIX    60 tablet    Take 1 tablet (20 mg) by mouth 2 times daily    Localized edema       * furosemide 20 MG tablet    LASIX    60 tablet    Take 1 tablet (20 mg) by mouth 2 times daily    Bilateral lower extremity edema       hydrochlorothiazide 12.5 MG capsule    MICROZIDE    30 capsule    Take 1 capsule (12.5 mg) by mouth daily    Hypocalcemia       levothyroxine 112 MCG tablet    SYNTHROID/LEVOTHROID    189 tablet    Mon-Sat: (2 tabs daily) Sunday: 3 tabs    Postsurgical hypothyroidism, Papillary carcinoma, follicular variant (H), Postsurgical hypoparathyroidism (H), Thyroid cancer (H)       lidocaine 5 % ointment    XYLOCAINE    350 g    Apply quarter size amount to chest and back up to 3 times daily as needed for pain.    Chest wall pain       lidocaine visc 2% 2.5mL/5mL & maalox/mylanta w/ simeth 2.5mL/5mL & diphenhydrAMINE 5mg/5mL Susp suspension    [facility] Mouthwash [facility]    360 mL    Swish and spit 10 mLs in mouth every 6 hours as needed for mouth sores    Stomatitis and mucositis, High grade B-cell lymphoma (H)       * lidocaine-prilocaine cream    EMLA    30 g    Apply topically as needed (port access pain.)    Neoplasm related pain, Chest wall pain       * lidocaine-prilocaine cream    EMLA    60 g    Apply topically as needed for moderate pain    High grade B-cell lymphoma (H)       methocarbamol 750 MG tablet    ROBAXIN     Take 1 tablet (750 mg) by mouth 4 times daily . Ok to take a 5th Robaxin on very severe days.    Myofascial pain       montelukast 10 MG tablet     SINGULAIR    60 tablet    Take 2 tablets (20 mg) by mouth 2 times daily    Idiopathic mast cell activation syndrome (H)       morphine 30 MG 12 hr tablet    MS CONTIN    120 tablet    Take 1 tablet (30 mg) by mouth every 8 hours . Take an addition pill at night such that your evening dose is 60mg    Neoplasm related pain       ondansetron 8 MG ODT tab    ZOFRAN-ODT    30 tablet    Take 1 tablet (8 mg) by mouth every 8 hours as needed    High grade B-cell lymphoma (H), Nausea and vomiting, intractability of vomiting not specified, unspecified vomiting type       * order for DME     2 Units    Equipment being ordered: compression stockings. 20-30 mm or 30 - 40 mm as patient can tolerate. Physical therapy to determine if they should be above or below the knee.    Venous stasis       * order for DME     2 Device    Equipment being ordered: compression bra    Malignant neoplasm of right female breast, unspecified site of breast       * order for DME     1 Units    Compression stockings, medium grade, please measure and fit. Please dispense a pair.    Peripheral edema       oxyCODONE IR 30 MG tablet    ROXICODONE    180 tablet    Take 1 tablet (30 mg) by mouth every 4 hours as needed for moderate to severe pain    High grade B-cell lymphoma (H)       polyethylene glycol powder    MIRALAX    510 g    Take 17 g (1 capful) by mouth daily    Acute constipation       potassium chloride SA 20 MEQ CR tablet    KLOR-CON    30 tablet    Take 1 tablet (20 mEq) by mouth 2 times daily    Hypokalemia       PROBIOTIC DAILY PO      Take 1 capsule by mouth daily Lacto acid bifidobacterium    Breast cancer, unspecified laterality, Thyroid cancer (H), Chronic arthralgias of knees and hips, unspecified laterality       prochlorperazine 10 MG tablet    COMPAZINE     Take 10 mg by mouth as needed        ranitidine 75 MG tablet    ZANTAC    90 tablet    Take 1 tablet (75 mg) by mouth 3 times daily    Mast cell disease, systemic        senna-docusate 8.6-50 MG per tablet    SENOKOT-S;PERICOLACE    60 tablet    Take 1-2 tablets by mouth 2 times daily as needed for constipation    Acute constipation       SUMAtriptan 50 MG tablet    IMITREX     Take 50 mg by mouth as needed        triamcinolone 0.025 % ointment    KENALOG     Apply topically as needed        * UNABLE TO FIND      3 tablets 3 times daily MEDICATION NAME: calcium D-Glucarate  3 caps contain 180mg of elemental calcium.        * UNABLE TO FIND      Take 2 capsules by mouth 3 times daily Muscle Mag. 2 caps contain B1 20mg, B2 20mg, B6 10mg, magesium 20mg, manganese 2mg.        * UNABLE TO FIND      2 tablets 3 times daily MEDICATION NAME: Digestzymes    Thyroid cancer (H), Postsurgical hypothyroidism, Postsurgical hypoparathyroidism (H)       * UNABLE TO FIND      1 tablet daily MEDICATION NAME: Pure Encapsulations    Thyroid cancer (H), Postsurgical hypothyroidism, Postsurgical hypoparathyroidism (H)       VENTOLIN  (90 BASE) MCG/ACT Inhaler   Generic drug:  albuterol     18 g    INHALE 2 PUFFS INTO THE LUNGS EVERY 4 HOURS AS NEEDED FOR SHORTNESS OF BREATH OR DIFFICULT BREATHING OR WHEEZING    Mild intermittent asthma without complication       vitamin D3 2000 UNITS Caps     60 capsule    Take 5,000 Units by mouth daily Takes 2 tabs daily    Thyroid cancer (H), Postsurgical hypothyroidism, Papillary carcinoma, follicular variant (H), Postsurgical hypoparathyroidism (H)       * Notice:  This list has 11 medication(s) that are the same as other medications prescribed for you. Read the directions carefully, and ask your doctor or other care provider to review them with you.

## 2018-02-28 NOTE — LETTER
"2/28/2018       RE: Tisha Arias  [address] [place] [address]     Dear Colleague,    Thank you for referring your patient, Tisha Arias, to the [facility] at [facility]. Please see a copy of my visit note below.    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 She is currently undergoing treatment with DA-R-EPOCH. Her past medical history is significant for breast cancer in 2011 s/p bilateral mastectomies and BENJIE/BSO up until recently on Tamoxifen, papillary thyroid cancer s/p total thyroidectomy, chronic chest wall pain, and asthma. Briefly, her oncologic history is as follows:    She presented in 8/2017 with dramatically worse chest and back pain. CT 9/1/17 showed lytic lesion right 10th rib extending to right T9-10 neural foramen with vertebral body invasion. There was associated conglomerate of lymphadenopathy around the mid T-spine. She had a CT guided biopsy showing B-cell high grade lymphoma. Pathology showed \"The morphology shows a population of large cells, diffuse lymphoid infiltrate. The cells are atypical with abundant mitotic and apoptotic activity and single cell necrosis. Tumor is CD45 and CD79a positive and focally weakly CD30 positive. The other stains revealed positivity for CD20, BCL6, BCL2 and MUM1, and CD10 and CD21 negative. The CD5 and CD3 highlighted background T-cells.  MYC expression was equivocal with approximately 40% nuclei staining.  Ki-67 proliferative index is greater than 90-95%. The immunohistochemistry is suggestive of non-GCB immunophenotype of diffuse large B-cell lymphoma. The cytogenetics did not show rearrangement of the BCL2 or c-MYC. There is the presence of BCL6 rearrangement in 78% of cells and gains of MYC and BCL2, but no " "amplifications.\"     Staging LP and BMBx were negative for lymphoma.   She started DA-REPOCH 10/6/17. She did well with chemo other than rigors during CIVI.  Post cycle 1 complicated by mucositis and worsening of chronic LE peripheral edema.   Cycle 2 on 10/27/17. Due to a rise in her LD and uric acid levels on that admission day, she underwent a CT scan which showed response to therapy with improvement in her mediastinal lymphadenopathy and right chest wall mass.  Cycle 3 on 11/16      Cycle 4 12/7    Interval History:  She reports that overall since hospital discharge she has been doing okay. Does complain of discomfort around her port and under her breast. Reports that she recovered from this cycle of chemo more quickly than before.       Current Outpatient Prescriptions   Medication Sig Dispense Refill     furosemide (LASIX) 20 MG tablet Take 1 tablet (20 mg) by mouth 2 times daily 60 tablet 0     lidocaine-prilocaine (EMLA) cream Apply topically as needed for moderate pain 60 g 1     morphine (MS CONTIN) 30 MG 12 hr tablet Take 1 tablet (30 mg) by mouth every 8 hours . Take an addition pill at night such that your evening dose is 60mg 120 tablet 0     oxyCODONE IR (ROXICODONE) 30 MG tablet Take 1 tablet (30 mg) by mouth every 4 hours as needed for moderate to severe pain 180 tablet 0     acyclovir (ZOVIRAX) 400 MG tablet Take 1 tablet (400 mg) by mouth 2 times daily 60 tablet 0     potassium chloride SA (KLOR-CON) 20 MEQ CR tablet Take 1 tablet (20 mEq) by mouth 2 times daily 30 tablet 0     diazepam (VALIUM) 5 MG tablet TAKE ONE TABLET BY MOUTH THREE TIMES DAILY AS NEEDED FOR MUSCLE SPASMS 90 tablet 1     cromolyn sodium (NASALCROM) 5.2 MG/ACT AERS Inhaler SPRAY ONE SPRAY( 1 ML) IN NOSTRIL DAILY 1 Bottle 6     lidocaine-prilocaine (EMLA) cream Apply topically as needed (port access pain.) 30 g 1     [employer] FIBER GUMMY BEARS CHILDREN CHEW Take 5 g by mouth daily (2 gummy= 5 g =1 serving) 120 tablet 3     " methocarbamol (ROBAXIN) 750 MG tablet Take 1 tablet (750 mg) by mouth 4 times daily . Ok to take a 5th Robaxin on very severe days.       diclofenac (VOLTAREN) 1 % GEL topical gel Apply affected area two times daily PRN using enclosed dosing card. 100 g 0     celecoxib (CELEBREX) 200 MG capsule Take 1 capsule (200 mg) by mouth 2 times daily 56 capsule 0     ondansetron (ZOFRAN-ODT) 8 MG ODT tab Take 1 tablet (8 mg) by mouth every 8 hours as needed (Patient taking differently: Take 8 mg by mouth every 8 hours ) 30 tablet 1     cetirizine (ZYRTEC ALLERGY) 10 MG tablet Take 1 tablet (10 mg) by mouth 3 times daily On hold for lab test. 90 tablet 0     hydrochlorothiazide (MICROZIDE) 12.5 MG capsule Take 1 capsule (12.5 mg) by mouth daily 30 capsule 0     calcium citrate-vitamin D (CALCIUM CITRATE +D) 315-250 MG-UNIT TABS per tablet Take 2 tablets by mouth twice a week 20 tablet 0     cyclobenzaprine (FLEXERIL) 10 MG tablet Take 1 tablet (10 mg) by mouth 3 times daily as needed (Patient taking differently: Take 10 mg by mouth 3 times daily ) 30 tablet 0     ranitidine (ZANTAC) 75 MG tablet Take 1 tablet (75 mg) by mouth 3 times daily 90 tablet 0     Cholecalciferol (VITAMIN D3) 2000 UNITS CAPS Take 5,000 Units by mouth daily Takes 2 tabs daily 60 capsule 0     furosemide (LASIX) 20 MG tablet Take 1 tablet (20 mg) by mouth 2 times daily 60 tablet 0     calcitRIOL (ROCALTROL) 0.25 MCG capsule TAKE 2 CAPSULES BY MOUTH EVERY MORNING AND TAKE 1 CAPSULE BY MOUTH EVERY NIGHT AT BEDTIME (Patient taking differently: Take 0.25 mcg by mouth 2 times daily TAKE 2 CAPSULES BY MOUTH EVERY MORNING AND TAKE 1 CAPSULE BY MOUTH EVERY NIGHT AT NOON.) 90 capsule 0     cromolyn (OPTICROM) 4 % ophthalmic solution Place 1 drop into both eyes 4 times daily 10 mL 0     montelukast (SINGULAIR) 10 MG tablet Take 2 tablets (20 mg) by mouth 2 times daily 60 tablet 0     magic mouthwash suspension (diphenhydrAMINE, lidocaine, aluminum-magnesium &  simethicone) Swish and spit 10 mLs in mouth every 6 hours as needed for mouth sores 360 mL 1     prochlorperazine (COMPAZINE) 10 MG tablet Take 10 mg by mouth as needed  3     SUMAtriptan (IMITREX) 50 MG tablet Take 50 mg by mouth as needed  3     acetaminophen (TYLENOL) 325 MG tablet Take 2 tablets (650 mg) by mouth every 4 hours as needed for mild pain or fever 100 tablet      triamcinolone (KENALOG) 0.025 % ointment Apply topically as needed  3     lidocaine (XYLOCAINE) 5 % ointment Apply quarter size amount to chest and back up to 3 times daily as needed for pain. 350 g 1     bisacodyl (DULCOLAX) 5 MG EC tablet Take 3 tablets (15 mg) by mouth daily as needed for constipation 30 tablet 0     polyethylene glycol (MIRALAX) powder Take 17 g (1 capful) by mouth daily 510 g 0     senna-docusate (SENOKOT-S;PERICOLACE) 8.6-50 MG per tablet Take 1-2 tablets by mouth 2 times daily as needed for constipation 60 tablet 0     UNABLE TO FIND Take 2 capsules by mouth 3 times daily Muscle Mag. 2 caps contain B1 20mg, B2 20mg, B6 10mg, magesium 20mg, manganese 2mg.       order for DME Compression stockings, medium grade, please measure and fit. Please dispense a pair. 1 Units 0     COMPOUND CONTAINING CONTROLLED SUBSTANCE (CMPD RX) - PHARMACY TO MIX COMPOUNDED MEDICATION Apply small amount to affected area two times daily. 120 g 6     levothyroxine (SYNTHROID/LEVOTHROID) 112 MCG tablet Mon-Sat: (2 tabs daily) Sunday: 3 tabs (Patient taking differently: 112 mcg Mon-Sat: (2 tabs daily) Sunday: 3 tabs) 189 tablet 3     EPINEPHrine (EPIPEN 2-CARIDAD) 0.3 MG/0.3ML injection Inject 0.3 mLs (0.3 mg) into the muscle once as needed for anaphylaxis 0.6 mL 3     VENTOLIN  (90 BASE) MCG/ACT Inhaler INHALE 2 PUFFS INTO THE LUNGS EVERY 4 HOURS AS NEEDED FOR SHORTNESS OF BREATH OR DIFFICULT BREATHING OR WHEEZING 18 g 3     order for DME Equipment being ordered: compression stockings. 20-30 mm or 30 - 40 mm as patient can tolerate. Physical  therapy to determine if they should be above or below the knee. 2 Units 4     order for DME Equipment being ordered: compression bra 2 Device 1     aluminum chloride (DRYSOL) 20 % external solution Apply topically At Bedtime 60 mL 3     UNABLE TO FIND 3 tablets 3 times daily MEDICATION NAME: calcium D-Glucarate   3 caps contain 180mg of elemental calcium.       Triamcinolone Acetonide (AZMACORT IN) Inhale 2 puffs into the lungs as needed       UNABLE TO FIND 2 tablets 3 times daily MEDICATION NAME: Digestzymes        UNABLE TO FIND 1 tablet daily MEDICATION NAME: Pure Encapsulations       Probiotic Product (PROBIOTIC DAILY PO) Take 1 capsule by mouth daily Lacto acid bifidobacterium       Physical Examination:  /61  Pulse 66  Temp 97.2  F (36.2  C)  Resp 16  SpO2 96%  Wt Readings from Last 10 Encounters:   02/23/18 89.1 kg (196 lb 8 oz)   02/01/18 86.9 kg (191 lb 8 oz)   01/29/18 81.1 kg (178 lb 14.4 oz)   01/24/18 86.3 kg (190 lb 4.8 oz)   01/22/18 87.2 kg (192 lb 3.9 oz)   01/18/18 88.2 kg (194 lb 8 oz)   01/15/18 86.1 kg (189 lb 14.4 oz)   01/09/18 82.3 kg (181 lb 8 oz)   01/03/18 85.7 kg (189 lb)   01/01/18 87.5 kg (193 lb)   Constitutional: NAD  Eyes:  PERRL. No scleral icterus.  ENT: Oral mucosa is moist without lesions or thrush.   Cardiovascular: Regular rate and rhythm. Port accessed without surrounding erythema or drainage  Respiratory: Clear to auscultation bilaterally. No wheezes or crackles.  Gastrointestinal: Bowel sounds +. Soft, nontender  Neurologic: normal speech, up to exam table unaided  Skin:No rashes noted.   Extremities: trace lower extremity edema bilaterally, compression stockings in place.    Laboratory Data:  No results found. However, due to the size of the patient record, not all encounters were searched. Please check Results Review for a complete set of results.PET scan 12/21  IMPRESSION:   1. In this patient with a history of diffuse large B-cell lymphoma,  breast cancer and  "papillary thyroid carcinoma there is evidence for  partial or complete response;      a. Markedly decreased size of right paravertebral soft tissue mass  involving the right 10th rib which is no longer metabolically active.      b. Decreased size of left mediastinal lymph node which is no  longer metabolically active.      c. No new lesions concerning for recurrence or metastasis.      d. Diffuse bone marrow FDG uptake likely related to bone marrow  reactivation.  2. Stable intra and extrahepatic biliary dilatation. There was no  laboratory correlation for obstruction on 12/19/2017. This most likely  represents reservoir effect status post cholecystectomy. There is a  surgical clip or stone at the cystic duct stump.  3. Moderate stool burden with fecalization of the contents within the  distal small bowel.  4. Nonobstructing 2 mm right renal stone.      Assessment and Plan:    DLBCL, \"double-hit\"-like, with c-myc overexpression but not re-arrangement. currently on treatment with DA-R-EPOCH  S/p 6 cycles   Complication include mild mucositis and mild infusion reaction (rigors), nausea and fatigue. ONly needed PRBC once. No infecions.     Interval CT CAP after 1 cycle due to elevated LD and uric acid showed response to treatment.  PET scan with CR with no PET avidity on 12/21 and EOT PET on 2/27 confirmed CR.    Elvin had a post-treatment/end of treatment CT/PET on 02/27, which showed a complete response with resolution of all sites of disease.  The bony area along the tenth rib is stable but has very low level FDG with an SUV of 4.4.  There are no new suspicious osseous lesions.  I am pleased to share with her that she is in CR.      The plan is to start observation.  She will see me back in 3 months with a CT of the chest, abdomen and pelvis, and I will follow her clinically.  I recommended to stay on acyclovir for 2 more months and then stop.  I also encouraged her to do the [facility] to help with the " swelling and continue Lasix 20 mg b.i.d. for her lower extremity edema.  She can stop her Levaquin and fluconazole and all other supportive care medications.  Follow up with Dr. Castro for hypothyroidism. I contacted  who recommended to start Tamoxifen at this time.     Continue ppx ACV 400mg BID for another month and then ok to stop.   History of Rachael en Y gastric bypass  Continue supplements (calcium citrate-vitamin D, vitamin D3, magnesium citrate). Also had iron deficiency anemia likely from poor absorption. S/o iron supplementation. Will monitor Hgb.    Chronic chest wall pain s/p mastectomies  Follows with Dr. Benitez. Palliative care to continue to prescribe pain medications. Taking MS Contin, Oxycodone for breakthrough pain, and celebrex. Continued to encourage pt to wean down on pain meds as tolerated. She appears lethargic  at times during clinica visit.      Garima Sauceda MD  Associate Professor of Medicine  [phone]

## 2018-02-28 NOTE — MR AVS SNAPSHOT
After Visit Summary   2/28/2018    Tisha Arias    MRN: [id number]           Patient Information     Date Of Birth          1960        Visit Information        Provider Department      2/28/2018 9:30 AM Edu Benitez MD [facility]        Today's Diagnoses     High grade B-cell lymphoma (H)    -  1    Chest wall pain           Follow-ups after your visit        Your next 10 appointments already scheduled     May 21, 2018  4:20 PM CDT   (Arrive by 4:05 PM)   RETURN ENDOCRINE with Avelina Castro MD   [facility] Endocrinology ([facility])    [address] [place] [id number] [phone]            May 21, 2018  5:00 PM CDT   (Arrive by 4:45 PM)   CT CHEST ABDOMEN PELVIS W/O & W CONTRAST with UCCT2   [facility] Imaging [place] ([facility])    [address] [place] [id number] [phone]           Please bring any scans or X-rays taken at other hospitals, if similar tests were done. Also bring a list of your medicines, including vitamins, minerals and over-the-counter drugs. It is safest to leave personal items at home.  Be sure to tell your doctor:   If you have any allergies.   If there s any chance you are pregnant.   If you are breastfeeding.  You may have contrast for this exam. To prepare:   Do not eat or drink for 2 hours before your exam. If you need to take medicine, you may take it with small sips of water. (We may ask you to take liquid medicine as well.)   The day before your exam, drink extra fluids at least six 8-ounce glasses (unless your doctor tells you to restrict your fluids).   You will be given instructions on how to drink the contrast.  Patients over 70 or patients with diabetes or kidney problems:   If you haven t had a blood test (creatinine test) within the last 30 days, the Cardiologist/Radiologist may require you to get this test  prior to your exam.  If you have diabetes:   Continue to take your metformin medication on the day of your exam  Please wear loose clothing, such as a sweat suit or jogging clothes. Avoid snaps, zippers and other metal. We may ask you to undress and put on a hospital gown.  If you have any questions, please call the Imaging Department where you will have your exam.            May 22, 2018  4:30 PM CDT   RETURN ONC with Garima Sauceda MD   [facility] Blood and Marrow Transplant ([facility])    [address] [place] [address]  [address] [place] [id number] [phone]            Jun 06, 2018  7:00 AM CDT   (Arrive by 6:45 AM)   Return Visit with Edu Benitez MD   [facility] ([facility])    [address] [place] [address]  [address] [place] [id number] [phone]            Jun 11, 2018  4:00 PM CDT   (Arrive by 3:45 PM)   Return Visit with Shahla Crawley MD   [facility] ([facility])    [address] [place] [address]  [address] [place] [id number] [phone]              Who to contact     If you have questions or need follow up information about today's clinic visit or your schedule please contact [facility] directly at [phone].  Normal or non-critical lab and imaging results will be communicated to you by MyChart, letter or phone within 4 business days after the clinic has received the results. If you do not hear from us within 7 days, please contact the clinic through [employer] or phone. If you have a critical or abnormal lab result, we will notify you by phone as soon as possible.  Submit refill requests through Tugg or call your pharmacy and they will forward the refill request to us. Please allow 3 business days for your refill to be completed.          Additional Information About Your Visit        Tugg Information     Tugg gives you secure access to  your electronic health record. If you see a primary care provider, you can also send messages to your care team and make appointments. If you have questions, please call your primary care clinic.  If you do not have a primary care provider, please call [phone] and they will assist you.        Care EveryWhere ID     This is your Care EveryWhere ID. This could be used by other organizations to access your [facility] medical records  [id number]         Blood Pressure from Last 3 Encounters:   02/28/18 118/61   02/23/18 129/75   02/16/18 109/72    Weight from Last 3 Encounters:   02/23/18 89.1 kg (196 lb 8 oz)   02/01/18 86.9 kg (191 lb 8 oz)   01/29/18 81.1 kg (178 lb 14.4 oz)              Today, you had the following     No orders found for display         Today's Medication Changes          These changes are accurate as of 2/28/18 11:59 PM.  If you have any questions, ask your nurse or doctor.               These medicines have changed or have updated prescriptions.        Dose/Directions    calcitRIOL 0.25 MCG capsule   Commonly known as:  ROCALTROL   This may have changed:    - how much to take  - how to take this  - when to take this  - additional instructions   Used for:  Postsurgical hypothyroidism, Papillary carcinoma, follicular variant (H), Metastasis to cervical lymph node (H)        TAKE 2 CAPSULES BY MOUTH EVERY MORNING AND TAKE 1 CAPSULE BY MOUTH EVERY NIGHT AT BEDTIME   Quantity:  90 capsule   Refills:  0       cyclobenzaprine 10 MG tablet   Commonly known as:  FLEXERIL   This may have changed:  when to take this   Used for:  High grade B-cell lymphoma (H)        Dose:  10 mg   Take 1 tablet (10 mg) by mouth 3 times daily as needed   Quantity:  30 tablet   Refills:  0       levothyroxine 112 MCG tablet   Commonly known as:  SYNTHROID/LEVOTHROID   This may have changed:    - how much to take  - additional instructions   Used for:  Postsurgical hypothyroidism, Papillary carcinoma, follicular variant  (H), Postsurgical hypoparathyroidism (H), Thyroid cancer (H)        Mon-Sat: (2 tabs daily) Sunday: 3 tabs   Quantity:  189 tablet   Refills:  3       ondansetron 8 MG ODT tab   Commonly known as:  ZOFRAN-ODT   This may have changed:  when to take this   Used for:  High grade B-cell lymphoma (H), Nausea and vomiting, intractability of vomiting not specified, unspecified vomiting type        Dose:  8 mg   Take 1 tablet (8 mg) by mouth every 8 hours as needed   Quantity:  30 tablet   Refills:  1         Stop taking these medicines if you haven't already. Please contact your care team if you have questions.     levofloxacin 250 MG tablet   Commonly known as:  LEVAQUIN   Stopped by:  Garima Sauceda MD                    Primary Care Provider Office Phone # Fax #    Shahla Raul Crawley [phone]       [address] [place] [address]  [facility] [address]        Equal Access to Services     RODRIGO ZAMORA AH: Hadii sacha ku hadasho Soomaali, waaxda luqadaha, qaybta kaalmada adeegyada, waxay idiin hayaan chanell yoder . So [facility] [place] [phone].    ATENCIÓN: Si habla esponelia, tiene a stiles disposición servicios gratuitos de asistencia lingüística. Llame al [phone].    We comply with applicable federal civil rights laws and [place] laws. We do not discriminate on the basis of race, color, national origin, age, disability, sex, sexual orientation, or gender identity.            Thank you!     Thank you for choosing [facility]  for your care. Our goal is always to provide you with excellent care. Hearing back from our patients is one way we can continue to improve our services. Please take a few minutes to complete the written survey that you may receive in the mail after your visit with us. Thank you!             Your Updated Medication List - Protect others around you: Learn how to safely use, store and throw away your medicines at www.disposemymeds.org.          This list is accurate  as of 2/28/18 11:59 PM.  Always use your most recent med list.                   Brand Name Dispense Instructions for use Diagnosis    acetaminophen 325 MG tablet    TYLENOL    100 tablet    Take 2 tablets (650 mg) by mouth every 4 hours as needed for mild pain or fever    High grade B-cell lymphoma (H)       acyclovir 400 MG tablet    ZOVIRAX    60 tablet    Take 1 tablet (400 mg) by mouth 2 times daily    High grade B-cell lymphoma (H)       aluminum chloride 20 % external solution    DRYSOL    60 mL    Apply topically At Bedtime    Rash, Intertrigo       AZMACORT IN      Inhale 2 puffs into the lungs as needed        bisacodyl 5 MG EC tablet     30 tablet    Take 3 tablets (15 mg) by mouth daily as needed for constipation    Constipation, unspecified constipation type       calcitRIOL 0.25 MCG capsule    ROCALTROL    90 capsule    TAKE 2 CAPSULES BY MOUTH EVERY MORNING AND TAKE 1 CAPSULE BY MOUTH EVERY NIGHT AT BEDTIME    Postsurgical hypothyroidism, Papillary carcinoma, follicular variant (H), Metastasis to cervical lymph node (H)       calcium citrate-vitamin D 315-250 MG-UNIT Tabs per tablet    CALCIUM CITRATE +D    20 tablet    Take 2 tablets by mouth twice a week    High grade B-cell lymphoma (H), Hypocalcemia       celecoxib 200 MG capsule    celeBREX    56 capsule    Take 1 capsule (200 mg) by mouth 2 times daily    Chest wall pain       cetirizine 10 MG tablet    ZYRTEC ALLERGY    90 tablet    Take 1 tablet (10 mg) by mouth 3 times daily On hold for lab test.    High grade B-cell lymphoma (H)       COMPOUND CONTAINING CONTROLLED SUBSTANCE - PHARMACY TO MIX COMPOUNDED MEDICATION    CMPD RX    120 g    Apply small amount to affected area two times daily.    Neoplasm related pain       cromolyn 4 % ophthalmic solution    OPTICROM    10 mL    Place 1 drop into both eyes 4 times daily    Idiopathic mast cell activation syndrome (H)       cromolyn sodium 5.2 MG/ACT Aers Inhaler    NASALCROM    1 Bottle    SPRAY  ONE SPRAY( 1 ML) IN NOSTRIL DAILY    Mast cell disease, systemic       [employer] GUM[employer]ILDR[employer] Chew     120 tablet    Take 5 g by mouth daily (2 gummy= 5 g =1 serving)    High grade B-cell lymphoma (H)       cyclobenzaprine 10 MG tablet    FLEXERIL    30 tablet    Take 1 tablet (10 mg) by mouth 3 times daily as needed    High grade B-cell lymphoma (H)       diazepam 5 MG tablet    VALIUM    90 tablet    TAKE ONE TABLET BY MOUTH THREE TIMES DAILY AS NEEDED FOR MUSCLE SPASMS    Chest wall pain       diclofenac 1 % Gel topical gel    VOLTAREN    100 g    Apply affected area two times daily PRN using enclosed dosing card.    Myofascial pain       EPINEPHrine 0.3 MG/0.3ML injection 2-pack    EPIPEN 2-CARIDAD    0.6 mL    Inject 0.3 mLs (0.3 mg) into the muscle once as needed for anaphylaxis        * furosemide 20 MG tablet    LASIX    60 tablet    Take 1 tablet (20 mg) by mouth 2 times daily    Localized edema       * furosemide 20 MG tablet    LASIX    60 tablet    Take 1 tablet (20 mg) by mouth 2 times daily    Bilateral lower extremity edema       hydrochlorothiazide 12.5 MG capsule    MICROZIDE    30 capsule    Take 1 capsule (12.5 mg) by mouth daily    Hypocalcemia       levothyroxine 112 MCG tablet    SYNTHROID/LEVOTHROID    189 tablet    Mon-Sat: (2 tabs daily) Sunday: 3 tabs    Postsurgical hypothyroidism, Papillary carcinoma, follicular variant (H), Postsurgical hypoparathyroidism (H), Thyroid cancer (H)       lidocaine 5 % ointment    XYLOCAINE    350 g    Apply quarter size amount to chest and back up to 3 times daily as needed for pain.    Chest wall pain       lidocaine visc 2% 2.5mL/5mL & maalox/mylanta w/ simeth 2.5mL/5mL & diphenhydrAMINE 5mg/5mL Susp suspension    [facility]    360 mL    Swish and spit 10 mLs in mouth every 6 hours as needed for mouth sores    Stomatitis and mucositis, High grade B-cell lymphoma (H)       * lidocaine-prilocaine cream    EMLA    30 g    Apply topically as  needed (port access pain.)    Neoplasm related pain, Chest wall pain       * lidocaine-prilocaine cream    EMLA    60 g    Apply topically as needed for moderate pain    High grade B-cell lymphoma (H)       methocarbamol 750 MG tablet    ROBAXIN     Take 1 tablet (750 mg) by mouth 4 times daily . Ok to take a 5th Robaxin on very severe days.    Myofascial pain       montelukast 10 MG tablet    SINGULAIR    60 tablet    Take 2 tablets (20 mg) by mouth 2 times daily    Idiopathic mast cell activation syndrome (H)       morphine 30 MG 12 hr tablet    MS CONTIN    120 tablet    Take 1 tablet (30 mg) by mouth every 8 hours . Take an addition pill at night such that your evening dose is 60mg    Neoplasm related pain       ondansetron 8 MG ODT tab    ZOFRAN-ODT    30 tablet    Take 1 tablet (8 mg) by mouth every 8 hours as needed    High grade B-cell lymphoma (H), Nausea and vomiting, intractability of vomiting not specified, unspecified vomiting type       * order for DME     2 Units    Equipment being ordered: compression stockings. 20-30 mm or 30 - 40 mm as patient can tolerate. Physical therapy to determine if they should be above or below the knee.    Venous stasis       * order for DME     2 Device    Equipment being ordered: compression bra    Malignant neoplasm of right female breast, unspecified site of breast       * order for DME     1 Units    Compression stockings, medium grade, please measure and fit. Please dispense a pair.    Peripheral edema       oxyCODONE IR 30 MG tablet    ROXICODONE    180 tablet    Take 1 tablet (30 mg) by mouth every 4 hours as needed for moderate to severe pain    High grade B-cell lymphoma (H)       polyethylene glycol powder    MIRALAX    510 g    Take 17 g (1 capful) by mouth daily    Acute constipation       potassium chloride SA 20 MEQ CR tablet    KLOR-CON    30 tablet    Take 1 tablet (20 mEq) by mouth 2 times daily    Hypokalemia       PROBIOTIC DAILY PO      Take 1 capsule  by mouth daily Lacto acid bifidobacterium    Breast cancer, unspecified laterality, Thyroid cancer (H), Chronic arthralgias of knees and hips, unspecified laterality       prochlorperazine 10 MG tablet    COMPAZINE     Take 10 mg by mouth as needed        ranitidine 75 MG tablet    ZANTAC    90 tablet    Take 1 tablet (75 mg) by mouth 3 times daily    Mast cell disease, systemic       senna-docusate 8.6-50 MG per tablet    SENOKOT-S;PERICOLACE    60 tablet    Take 1-2 tablets by mouth 2 times daily as needed for constipation    Acute constipation       SUMAtriptan 50 MG tablet    IMITREX     Take 50 mg by mouth as needed        triamcinolone 0.025 % ointment    KENALOG     Apply topically as needed        * UNABLE TO FIND      3 tablets 3 times daily MEDICATION NAME: calcium D-Glucarate  3 caps contain 180mg of elemental calcium.        * UNABLE TO FIND      Take 2 capsules by mouth 3 times daily Muscle Mag. 2 caps contain B1 20mg, B2 20mg, B6 10mg, magesium 20mg, manganese 2mg.        * UNABLE TO FIND      2 tablets 3 times daily MEDICATION NAME: Digestzymes    Thyroid cancer (H), Postsurgical hypothyroidism, Postsurgical hypoparathyroidism (H)       * UNABLE TO FIND      1 tablet daily MEDICATION NAME: Pure Encapsulations    Thyroid cancer (H), Postsurgical hypothyroidism, Postsurgical hypoparathyroidism (H)       VENTOLIN  (90 BASE) MCG/ACT Inhaler   Generic drug:  albuterol     18 g    INHALE 2 PUFFS INTO THE LUNGS EVERY 4 HOURS AS NEEDED FOR SHORTNESS OF BREATH OR DIFFICULT BREATHING OR WHEEZING    Mild intermittent asthma without complication       vitamin D3 2000 UNITS Caps     60 capsule    Take 5,000 Units by mouth daily Takes 2 tabs daily    Thyroid cancer (H), Postsurgical hypothyroidism, Papillary carcinoma, follicular variant (H), Postsurgical hypoparathyroidism (H)       * Notice:  This list has 11 medication(s) that are the same as other medications prescribed for you. Read the directions  carefully, and ask your doctor or other care provider to review them with you.

## 2018-02-28 NOTE — NURSING NOTE
"Oncology Rooming Note    February 28, 2018 9:29 AM   Tisha Arias is a 57 year old female who presents for:    Chief Complaint   Patient presents with     Oncology Clinic Visit     Return: Palliative     Initial Vitals: There were no vitals taken for this visit. Estimated body mass index is 32.7 kg/(m^2) as calculated from the following:    Height as of 2/23/18: 1.651 m (5' 5\").    Weight as of 2/23/18: 89.1 kg (196 lb 8 oz). There is no height or weight on file to calculate BSA.  Data Unavailable Comment: Data Unavailable   No LMP recorded. Patient has had a hysterectomy.  Allergies reviewed: No  Medications reviewed: No        Medications: refills needed today are MS Contin 30 mg, QID; Hydrocodone 30 mg, q4h; Diazepam 5 mg.    Pharmacy name entered into REEL Qualified:All  meds go to [facility] with exception of Valium, that goes to [facility]    Clinical concerns: new concerns is the pain; the pain is rating a 9 to  10 on the pain scale especially her feet and her chest.   Dr. Benitez was notified.    15 minutes for nursing intake (face to face time)     Lashonda Talbert CMA              "

## 2018-03-01 ENCOUNTER — TELEPHONE (OUTPATIENT)
Dept: ONCOLOGY | Facility: CLINIC | Age: 58
End: 2018-03-01

## 2018-03-01 NOTE — TELEPHONE ENCOUNTER
----- Message from Shahla Crawley MD sent at 3/1/2018  9:31 AM CST -----  Sounds good.    Seema, can you call Elvin?  She can restart her tamoxifen.    Thanks    Shahla  ----- Message -----     From: Garima Sauceda MD     Sent: 2/28/2018   9:10 PM       To: Shahla Crawley MD, Laila Barrett, RN    Ok - I prefer you manage tamoxifen - dose etc-  V  ----- Message -----     From: Shahla Crawley MD     Sent: 2/28/2018   4:56 PM       To: Garima Sauceda MD, Laila Barrett, RN    I would restart tamoxifen.    Great news!  ----- Message -----     From: Garima Sauceda MD     Sent: 2/28/2018   3:48 PM       To: Shahla Crawley MD, CASSIDY SesayElvin had a complete resopnse to chemo - now is done.  Do you her resume Tamoxifen ?  My plan it so to do CT in 3 and 9 months and keep her for about 2 years in my clinic. Let me know if you want to see her too.  tnx  Garima

## 2018-03-01 NOTE — TELEPHONE ENCOUNTER
I called patient's cell phone & left a detailed message: Per Dr Crawley and Dr Sauceda, patient may resume her oral tamoxifen (maintenance treatment for hx breast cancer) since she is now done with chemo for lymphoma.     Pt should see Dr Crawley as scheduled on 6/11/18. I provided [facility] line & requested that patient call back to acknowledge these recommendations to restart tamoxifen. Will advise RNCC, Seema Chadwick, to contact patient tomorrow if no response.

## 2018-03-02 ENCOUNTER — CARE COORDINATION (OUTPATIENT)
Dept: ONCOLOGY | Facility: CLINIC | Age: 58
End: 2018-03-02

## 2018-03-02 NOTE — PROGRESS NOTES
Called patient's cell phone and left a voice mail to return call to discuss Dr Crawley recommendations to re-start Tamoxifen. Instructed patient to return call to discuss Dr Crawley recommendations. Seema Chadwick RN, BSN

## 2018-03-05 DIAGNOSIS — G89.3 NEOPLASM RELATED PAIN: ICD-10-CM

## 2018-03-05 NOTE — TELEPHONE ENCOUNTER
FV-Ketamine 6%, Lido 10%, Ketoprof 10% in PLO (compounded)     Last Written Prescription Date:  10/4/17  Last Fill Quantity: 120 grams,   # refills: 6  Last Office Visit: 2/28/18  Future Office visit:  None    Routing refill request to provider for review/approval because:  Compound medication

## 2018-03-13 ENCOUNTER — MYC REFILL (OUTPATIENT)
Dept: PALLIATIVE CARE | Facility: CLINIC | Age: 58
End: 2018-03-13

## 2018-03-13 ENCOUNTER — MYC REFILL (OUTPATIENT)
Dept: ONCOLOGY | Facility: CLINIC | Age: 58
End: 2018-03-13

## 2018-03-13 ENCOUNTER — MYC REFILL (OUTPATIENT)
Dept: FAMILY MEDICINE | Facility: CLINIC | Age: 58
End: 2018-03-13

## 2018-03-13 DIAGNOSIS — C85.10 HIGH GRADE B-CELL LYMPHOMA (H): ICD-10-CM

## 2018-03-13 DIAGNOSIS — G89.3 NEOPLASM RELATED PAIN: ICD-10-CM

## 2018-03-13 DIAGNOSIS — R60.0 BILATERAL LOWER EXTREMITY EDEMA: ICD-10-CM

## 2018-03-13 DIAGNOSIS — R60.0 PERIPHERAL EDEMA: ICD-10-CM

## 2018-03-13 DIAGNOSIS — C50.911 MALIGNANT NEOPLASM OF RIGHT FEMALE BREAST (H): ICD-10-CM

## 2018-03-13 DIAGNOSIS — I87.8 VENOUS STASIS: ICD-10-CM

## 2018-03-13 DIAGNOSIS — R07.89 CHEST WALL PAIN: ICD-10-CM

## 2018-03-13 DIAGNOSIS — E87.6 HYPOKALEMIA: ICD-10-CM

## 2018-03-14 DIAGNOSIS — S91.209A: Primary | ICD-10-CM

## 2018-03-14 DIAGNOSIS — S91.209A TOENAIL AVULSION: Primary | ICD-10-CM

## 2018-03-14 RX ORDER — OXYCODONE HYDROCHLORIDE 30 MG/1
30 TABLET ORAL EVERY 4 HOURS PRN
Qty: 180 TABLET | Refills: 0 | Status: SHIPPED | OUTPATIENT
Start: 2018-03-24 | End: 2018-04-11

## 2018-03-14 RX ORDER — LIDOCAINE 50 MG/G
OINTMENT TOPICAL
Qty: 350 G | Refills: 1 | Status: SHIPPED | OUTPATIENT
Start: 2018-03-14 | End: 2018-05-24

## 2018-03-14 RX ORDER — MORPHINE SULFATE 30 MG/1
30 TABLET, FILM COATED, EXTENDED RELEASE ORAL EVERY 8 HOURS
Qty: 120 TABLET | Refills: 0 | Status: CANCELLED | OUTPATIENT
Start: 2018-03-14

## 2018-03-14 RX ORDER — OXYCODONE HYDROCHLORIDE 30 MG/1
30 TABLET ORAL EVERY 4 HOURS PRN
Qty: 180 TABLET | Refills: 0 | Status: CANCELLED | OUTPATIENT
Start: 2018-03-14

## 2018-03-14 RX ORDER — MORPHINE SULFATE 30 MG/1
30 TABLET, FILM COATED, EXTENDED RELEASE ORAL EVERY 8 HOURS
Qty: 120 TABLET | Refills: 0 | Status: SHIPPED | OUTPATIENT
Start: 2018-03-24 | End: 2018-04-11

## 2018-03-14 RX ORDER — LIDOCAINE 50 MG/G
OINTMENT TOPICAL
Qty: 350 G | Refills: 1 | Status: CANCELLED | OUTPATIENT
Start: 2018-03-14

## 2018-03-14 NOTE — TELEPHONE ENCOUNTER
Received VM & mychart request from patient requesting refills of morphine, oxycodone, and lidocaine ointment.     Last refills oxycodone & morphine: 2/24/2018  Last refill lidocaine ointment: 11/24/2018  Last office visit: 2/28/2018  Scheduled for follow up 6/6/2018     Will route request to MD for review.     Reviewed [place]  Report.

## 2018-03-14 NOTE — TELEPHONE ENCOUNTER
Message from [employer]Merge[employer]:  Original authorizing provider: Edu Benitez MD    Elvin Arias would like a refill of the following medications:  lidocaine (XYLOCAINE) 5 % ointment [Edu Benitez MD]  morphine (MS CONTIN) 30 MG 12 hr tablet [Edu Benitez MD]  oxyCODONE IR (ROXICODONE) 30 MG tablet [Edu Benitez MD]    Preferred pharmacy: [id number] - [place], [place] - [address] [address] [place]    Comment:  acyclovir 400mg bid, Lasix 20mg bid, Lidocaine/Prilocaine (EMLA), K+ 20 Meq bid1 qd, Fexeril qid. [employer] [facility] [place] Knee hi compression stocking - paper order Selina BenitezMorphine and Oxycodone - at [facility]. all others at [facility] [place] 5% Lidocaine 2 tubes, Robaxin 750 mg qid, Voltaren Gel 2 tubes, Celebrex 200mg bid [employer] [facility] [place] Dr Castro HCTZ 12.5mg [employer] [facility] [place] Current EPI PENS  x2 yrs need new order [employer] [facility] [place]    Medication renewals requested in this message routed to other providers:  order for DME [Rashida Jaffe PA-C]  order for DME [NAS HARMAN MD]  order for DME [NAS HARMAN MD]

## 2018-03-14 NOTE — TELEPHONE ENCOUNTER
Message from Mine:  Dionne Powell Wed Mar 14, 2018 7:03 AM        ----- Message -----   From: Tisha Arias   Sent: 3/13/2018 6:08 PM   To: [employer]  Subject: Medication Renewal Request     Original authorizing provider: MD Elvin DOMINGUEZ would like a refill of the following medications:  order for DME [NAS HARMAN MD]  order for DME [NAS HARMAN MD]    Preferred pharmacy: [id number] - [place], [place] - [address] [address]. [place]    Comment:  acyclovir 400mg bid, Lasix 20mg bid, Lidocaine/Prilocaine (EMLA), K+ 20 Meq bid1 qd, Fexeril qid. [employer] [place] Knee hi compression stocking - paper order Selina BenitezMorpyevgeniy and Oxycodone - at [facility] pharmacy/clinic. all others at [facility] in [place] 5% Lidocaine 2 tubes, Robaxin 750 mg qid, Voltaren Gel 2 tubes, Celebrex 200mg bid [employer] in [place] Dr Castro HCTZ 12.5mg [employer] [facility] [place] Current EPI PENS  x2 yrs need new order [employer] [place]    Medication renewals requested in this message routed to other providers:  order for DME [Rashida Jaffe PA-C]  lidocaine (XYLOCAINE) 5 % ointment [Edu Benitez MD]  morphine (MS CONTIN) 30 MG 12 hr tablet [Edu Benitez MD]  oxyCODONE IR (ROXICODONE) 30 MG tablet [Edu Benitez MD]

## 2018-03-15 NOTE — TELEPHONE ENCOUNTER
Left VM for patient asking for call back to clarify how she would like to receive her approved scripts - also advised of forward date on pain meds.

## 2018-03-16 RX ORDER — ACYCLOVIR 400 MG/1
400 TABLET ORAL 2 TIMES DAILY
Qty: 60 TABLET | Refills: 1 | Status: CANCELLED | OUTPATIENT
Start: 2018-03-16

## 2018-03-16 RX ORDER — FUROSEMIDE 20 MG
20 TABLET ORAL 2 TIMES DAILY
Qty: 60 TABLET | Refills: 1 | Status: SHIPPED | OUTPATIENT
Start: 2018-03-16 | End: 2018-05-14

## 2018-03-16 RX ORDER — CYCLOBENZAPRINE HCL 10 MG
10 TABLET ORAL 3 TIMES DAILY PRN
Qty: 30 TABLET | Status: CANCELLED | OUTPATIENT
Start: 2018-03-16

## 2018-03-16 RX ORDER — POTASSIUM CHLORIDE 1500 MG/1
20 TABLET, EXTENDED RELEASE ORAL 2 TIMES DAILY
Qty: 30 TABLET | Refills: 1 | Status: SHIPPED | OUTPATIENT
Start: 2018-03-16 | End: 2018-04-09

## 2018-03-16 NOTE — TELEPHONE ENCOUNTER
Message from [employer]:  Original authorizing provider: Rashida Jaffe PA-C    Elvin GARNETTJonatan Twinjohn would like a refill of the following medications:  order for DME [Rashida Jaffe PA-C]    Preferred pharmacy: [id number] - [place], [place] - [address] [address] [place]    Comment:  acyclovir 400mg bid, Lasix 20mg bid, Lidocaine/Prilocaine (EMLA), K+ 20 Meq bid1 qd, Fexeril qid. [employer] [place] Knee hi compression stocking - paper order Selina BenitezMorphine and Oxycodone - at [facility] pharmacy/clinic. all others at [facility] in [place] 5% Lidocaine 2 tubes, Robaxin 750 mg qid, Voltaren Gel 2 tubes, Celebrex 200mg bid [employer] in [place] Dr Castro HCTZ 12.5mg [employer] [place] Current EPI PENS  x2 yrs need new order [employer] [place]    Medication renewals requested in this message routed to other providers:  order for DME [NAS HARMAN MD]  order for DME [NAS HARMAN MD]  lidocaine (XYLOCAINE) 5 % ointment [Edu Benitez MD]  morphine (MS CONTIN) 30 MG 12 hr tablet [Edu Benitez MD]  oxyCODONE IR (ROXICODONE) 30 MG tablet [Edu Benitez MD]

## 2018-03-16 NOTE — TELEPHONE ENCOUNTER
Teed up acyclovir, cyclobenzaprine, furosemide and potassium.   Pt also asked for refills of Emla and compression stockings. Spoke with [facility], they have 1 refill available on Emla cream. Dr. Crawley signed for compression stockings 3/14/18 with 4 refills, script was printed-Fitfu message sent to pt.    Cyclobenzaprine was filled by hospitalist on 1/22/18: routing to care team to approve.

## 2018-03-16 NOTE — TELEPHONE ENCOUNTER
Per Dr. Sauceda, ok to refill lasix. Pt to stop acyclovir and get flexeril from pain or palliative clinic. [employer] message sent to pt.

## 2018-03-20 DIAGNOSIS — C85.10 HIGH GRADE B-CELL LYMPHOMA (H): ICD-10-CM

## 2018-03-20 RX ORDER — ACYCLOVIR 400 MG/1
400 TABLET ORAL 2 TIMES DAILY
Qty: 30 TABLET | Refills: 0 | Status: SHIPPED | OUTPATIENT
Start: 2018-03-20 | End: 2019-01-29

## 2018-03-22 ENCOUNTER — OFFICE VISIT (OUTPATIENT)
Dept: WOUND CARE | Facility: CLINIC | Age: 58
End: 2018-03-22
Payer: COMMERCIAL

## 2018-03-22 DIAGNOSIS — R60.0 LOWER EXTREMITY EDEMA: Primary | ICD-10-CM

## 2018-03-22 DIAGNOSIS — S91.209A AVULSION OF TOENAIL, INITIAL ENCOUNTER: ICD-10-CM

## 2018-03-22 DIAGNOSIS — Z92.21 STATUS POST CHEMOTHERAPY: ICD-10-CM

## 2018-03-22 NOTE — MR AVS SNAPSHOT
After Visit Summary   3/22/2018    Tisha Arias    MRN: [id number]           Patient Information     Date Of Birth          1960        Visit Information        Provider Department      3/22/2018 8:00 AM Symone Horan PA-C [facility]        Today's Diagnoses     Lower extremity edema    -  1    Avulsion of toenail, initial encounter        Status post chemotherapy           Follow-ups after your visit        Your next 10 appointments already scheduled     May 21, 2018  4:20 PM CDT   (Arrive by 4:05 PM)   RETURN ENDOCRINE with Avelina Castro MD   [facility] ([facility])    [address] [place] [id number] [phone]            May 21, 2018  5:00 PM CDT   (Arrive by 4:45 PM)   CT CHEST ABDOMEN PELVIS W/O & W CONTRAST with UCCT2   [facility] ([facility])    [address] [place] [id number] [phone]           Please bring any scans or X-rays taken at other hospitals, if similar tests were done. Also bring a list of your medicines, including vitamins, minerals and over-the-counter drugs. It is safest to leave personal items at home.  Be sure to tell your doctor:   If you have any allergies.   If there s any chance you are pregnant.   If you are breastfeeding.  You may have contrast for this exam. To prepare:   Do not eat or drink for 2 hours before your exam. If you need to take medicine, you may take it with small sips of water. (We may ask you to take liquid medicine as well.)   The day before your exam, drink extra fluids at least six 8-ounce glasses (unless your doctor tells you to restrict your fluids).   You will be given instructions on how to drink the contrast.  Patients over 70 or patients with diabetes or kidney problems:   If you haven t had a blood test (creatinine test) within the last 30 days, the Cardiologist/Radiologist may  require you to get this test prior to your exam.  If you have diabetes:   Continue to take your metformin medication on the day of your exam  Please wear loose clothing, such as a sweat suit or jogging clothes. Avoid snaps, zippers and other metal. We may ask you to undress and put on a hospital gown.  If you have any questions, please call the Imaging Department where you will have your exam.            May 29, 2018  4:00 PM CDT   RETURN ONC with Garima Sauceda MD   [facility] ([facility])    [address] [place] [address]  [address]  [facility] [id number] [phone]            Jun 06, 2018  7:00 AM CDT   (Arrive by 6:45 AM)   Return Visit with Edu Benitez MD   [facility] ([facility])    [address] [place] [address]  [address]  [facility] [id number] [phone]            Jun 11, 2018  4:00 PM CDT   (Arrive by 3:45 PM)   Return Visit with Shahla Crawley MD   [facility] ([facility])    [address] [place] [address]  [address]  [facility] [id number] [phone]              Who to contact     Please call your clinic at [phone] to:    Ask questions about your health    Make or cancel appointments    Discuss your medicines    Learn about your test results    Speak to your doctor            Additional Information About Your Visit        [employer] Information     [employer] gives you secure access to your electronic health record. If you see a primary care provider, you can also send messages to your care team and make appointments. If you have questions, please call your primary care clinic.  If you do not have a primary care provider, please call [phone] and they will assist you.      [employer] is an electronic gateway that provides easy, online access to your medical records. With [employer], you can request a clinic appointment, read your test results, renew a  prescription or communicate with your care team.     To access your existing account, please contact your [facility] or call [phone] for assistance.        Care EveryWhere ID     This is your Care EveryWhere ID. This could be used by other organizations to access your [facility] [place] medical records  [id number]         Blood Pressure from Last 3 Encounters:   02/28/18 118/61   02/23/18 129/75   02/16/18 109/72    Weight from Last 3 Encounters:   02/23/18 196 lb 8 oz   02/01/18 191 lb 8 oz   01/29/18 178 lb 14.4 oz              Today, you had the following     No orders found for display         Today's Medication Changes          These changes are accurate as of 3/22/18  8:44 AM.  If you have any questions, ask your nurse or doctor.               Start taking these medicines.        Dose/Directions    order for DME   Used for:  Lower extremity edema        Compression socks - knee high 20-30 mmHg zippered if possible   Quantity:  1 Units   Refills:  11         These medicines have changed or have updated prescriptions.        Dose/Directions    calcitRIOL 0.25 MCG capsule   Commonly known as:  ROCALTROL   This may have changed:    - how much to take  - how to take this  - when to take this  - additional instructions   Used for:  Postsurgical hypothyroidism, Papillary carcinoma, follicular variant (H), Metastasis to cervical lymph node (H)        TAKE 2 CAPSULES BY MOUTH EVERY MORNING AND TAKE 1 CAPSULE BY MOUTH EVERY NIGHT AT BEDTIME   Quantity:  90 capsule   Refills:  0       cyclobenzaprine 10 MG tablet   Commonly known as:  FLEXERIL   This may have changed:  when to take this   Used for:  High grade B-cell lymphoma (H)        Dose:  10 mg   Take 1 tablet (10 mg) by mouth 3 times daily as needed   Quantity:  30 tablet   Refills:  0       levothyroxine 112 MCG tablet   Commonly known as:  SYNTHROID/LEVOTHROID   This may have changed:    - how much to take  - additional instructions    Used for:  Postsurgical hypothyroidism, Papillary carcinoma, follicular variant (H), Postsurgical hypoparathyroidism (H), Thyroid cancer (H)        Mon-Sat: (2 tabs daily) Sunday: 3 tabs   Quantity:  189 tablet   Refills:  3       ondansetron 8 MG ODT tab   Commonly known as:  ZOFRAN-ODT   This may have changed:  when to take this   Used for:  High grade B-cell lymphoma (H), Nausea and vomiting, intractability of vomiting not specified, unspecified vomiting type        Dose:  8 mg   Take 1 tablet (8 mg) by mouth every 8 hours as needed   Quantity:  30 tablet   Refills:  1            Where to get your medicines      Some of these will need a paper prescription and others can be bought over the counter.  Ask your nurse if you have questions.     Bring a paper prescription for each of these medications     order for DME                Primary Care Provider Office Phone # Fax #    Shahla Raul Crawley [phone]       [address] [place] [id number]        Equal Access to Services     RODRIGO ZAMORA : Hadii sacha ku hadasho Soomaali, waaxda luqadaha, qaybta kaalmada adeegyada, waxay idiin haydaljitn chanell yoder . So [facility] [phone].    ATENCIÓN: Si habla español, tiene a stiles disposición servicios gratuitos de asistencia lingüística. Llame al [phone].    We comply with applicable federal civil rights laws and [place] laws. We do not discriminate on the basis of race, color, national origin, age, disability, sex, sexual orientation, or gender identity.            Thank you!     Thank you for choosing [facility]  for your care. Our goal is always to provide you with excellent care. Hearing back from our patients is one way we can continue to improve our services. Please take a few minutes to complete the written survey that you may receive in the mail after your visit with us. Thank you!             Your Updated Medication List - Protect others around you: Learn how to  safely use, store and throw away your medicines at www.disposemymeds.org.          This list is accurate as of 3/22/18  8:44 AM.  Always use your most recent med list.                   Brand Name Dispense Instructions for use Diagnosis    ACAHSAHNAZ RAMIREZ PO      Take 50 mg by mouth        acetaminophen 325 MG tablet    TYLENOL    100 tablet    Take 2 tablets (650 mg) by mouth every 4 hours as needed for mild pain or fever    High grade B-cell lymphoma (H)       acyclovir 400 MG tablet    ZOVIRAX    30 tablet    Take 1 tablet (400 mg) by mouth 2 times daily    High grade B-cell lymphoma (H)       aluminum chloride 20 % external solution    DRYSOL    60 mL    Apply topically At Bedtime    Rash, Intertrigo       AZMACORT IN      Inhale 2 puffs into the lungs as needed        bisacodyl 5 MG EC tablet     30 tablet    Take 3 tablets (15 mg) by mouth daily as needed for constipation    Constipation, unspecified constipation type       calcitRIOL 0.25 MCG capsule    ROCALTROL    90 capsule    TAKE 2 CAPSULES BY MOUTH EVERY MORNING AND TAKE 1 CAPSULE BY MOUTH EVERY NIGHT AT BEDTIME    Postsurgical hypothyroidism, Papillary carcinoma, follicular variant (H), Metastasis to cervical lymph node (H)       calcium citrate-vitamin D 315-250 MG-UNIT Tabs per tablet    CALCIUM CITRATE +D    20 tablet    Take 2 tablets by mouth twice a week    High grade B-cell lymphoma (H), Hypocalcemia       celecoxib 200 MG capsule    celeBREX    56 capsule    Take 1 capsule (200 mg) by mouth 2 times daily    Chest wall pain       cetirizine 10 MG tablet    ZYRTEC ALLERGY    90 tablet    Take 1 tablet (10 mg) by mouth 3 times daily On hold for lab test.    High grade B-cell lymphoma (H)       COMPOUND CONTAINING CONTROLLED SUBSTANCE - PHARMACY TO MIX COMPOUNDED MEDICATION    CMPD RX    120 g    Apply small amount to affected area two times daily.    Neoplasm related pain       cromolyn 4 % ophthalmic solution    OPTICROM    10 mL    Place 1 drop into  both eyes 4 times daily    Idiopathic mast cell activation syndrome (H)       cromolyn sodium 5.2 MG/ACT Aers Inhaler    NASALCROM    1 Bottle    SPRAY ONE SPRAY( 1 ML) IN NOSTRIL DAILY    Mast cell disease, systemic       [employer] G[employer] Chew     120 tablet    Take 5 g by mouth daily (2 gummy= 5 g =1 serving)    High grade B-cell lymphoma (H)       cyclobenzaprine 10 MG tablet    FLEXERIL    30 tablet    Take 1 tablet (10 mg) by mouth 3 times daily as needed    High grade B-cell lymphoma (H)       diazepam 5 MG tablet    VALIUM    90 tablet    TAKE ONE TABLET BY MOUTH THREE TIMES DAILY AS NEEDED FOR MUSCLE SPASMS    Chest wall pain       diclofenac 1 % Gel topical gel    VOLTAREN    100 g    Apply affected area two times daily PRN using enclosed dosing card.    Myofascial pain       EPINEPHrine 0.3 MG/0.3ML injection 2-pack    EPIPEN 2-CARIDAD    0.6 mL    Inject 0.3 mLs (0.3 mg) into the muscle once as needed for anaphylaxis        * furosemide 20 MG tablet    LASIX    60 tablet    Take 1 tablet (20 mg) by mouth 2 times daily    Localized edema       * furosemide 20 MG tablet    LASIX    60 tablet    Take 1 tablet (20 mg) by mouth 2 times daily    Bilateral lower extremity edema       hydrochlorothiazide 12.5 MG capsule    MICROZIDE    30 capsule    Take 1 capsule (12.5 mg) by mouth daily    Hypocalcemia       levothyroxine 112 MCG tablet    SYNTHROID/LEVOTHROID    189 tablet    Mon-Sat: (2 tabs daily) Sunday: 3 tabs    Postsurgical hypothyroidism, Papillary carcinoma, follicular variant (H), Postsurgical hypoparathyroidism (H), Thyroid cancer (H)       lidocaine 5 % ointment    XYLOCAINE    350 g    Apply quarter size amount to chest and back up to 3 times daily as needed for pain.    Chest wall pain       lidocaine visc 2% 2.5mL/5mL & maalox/mylanta w/ simeth 2.5mL/5mL & diphenhydrAMINE 5mg/5mL Susp suspension    [facility]    360 mL    Swish and spit 10 mLs in mouth every 6 hours as  needed for mouth sores    Stomatitis and mucositis, High grade B-cell lymphoma (H)       * lidocaine-prilocaine cream    EMLA    30 g    Apply topically as needed (port access pain.)    Neoplasm related pain, Chest wall pain       * lidocaine-prilocaine cream    EMLA    60 g    Apply topically as needed for moderate pain    High grade B-cell lymphoma (H)       methocarbamol 750 MG tablet    ROBAXIN     Take 1 tablet (750 mg) by mouth 4 times daily . Ok to take a 5th Robaxin on very severe days.    Myofascial pain       montelukast 10 MG tablet    SINGULAIR    60 tablet    Take 2 tablets (20 mg) by mouth 2 times daily    Idiopathic mast cell activation syndrome (H)       morphine 30 MG 12 hr tablet   Start taking on:  3/24/2018    MS CONTIN    120 tablet    Take 1 tablet (30 mg) by mouth every 8 hours . Take an addition pill at night such that your evening dose is 60mg    Neoplasm related pain       ondansetron 8 MG ODT tab    ZOFRAN-ODT    30 tablet    Take 1 tablet (8 mg) by mouth every 8 hours as needed    High grade B-cell lymphoma (H), Nausea and vomiting, intractability of vomiting not specified, unspecified vomiting type       * order for DME     1 Units    Compression stockings, medium grade, please measure and fit. Please dispense a pair.    Peripheral edema       * order for DME     2 Units    Equipment being ordered: compression stockings. 20-30 mm or 30 - 40 mm as patient can tolerate. Physical therapy to determine if they should be above or below the knee.    Venous stasis       * order for DME     2 Device    Equipment being ordered: compression bra    Malignant neoplasm of right female breast (H)       order for DME     1 Units    Compression socks - knee high 20-30 mmHg zippered if possible    Lower extremity edema       oxyCODONE IR 30 MG tablet   Start taking on:  3/24/2018    ROXICODONE    180 tablet    Take 1 tablet (30 mg) by mouth every 4 hours as needed for moderate to severe pain    High grade  B-cell lymphoma (H)       polyethylene glycol powder    MIRALAX    510 g    Take 17 g (1 capful) by mouth daily    Acute constipation       potassium chloride SA 20 MEQ CR tablet    KLOR-CON    30 tablet    Take 1 tablet (20 mEq) by mouth 2 times daily    Hypokalemia       PROBIOTIC DAILY PO      Take 1 capsule by mouth daily Lacto acid bifidobacterium    Breast cancer, unspecified laterality, Thyroid cancer (H), Chronic arthralgias of knees and hips, unspecified laterality       prochlorperazine 10 MG tablet    COMPAZINE     Take 10 mg by mouth as needed        ranitidine 75 MG tablet    ZANTAC    90 tablet    Take 1 tablet (75 mg) by mouth 3 times daily    Mast cell disease, systemic       senna-docusate 8.6-50 MG per tablet    SENOKOT-S;PERICOLACE    60 tablet    Take 1-2 tablets by mouth 2 times daily as needed for constipation    Acute constipation       SUMAtriptan 50 MG tablet    IMITREX     Take 50 mg by mouth as needed        triamcinolone 0.025 % ointment    KENALOG     Apply topically as needed        UNABLE TO FIND      3 tablets 3 times daily MEDICATION NAME: calcium D-Glucarate  3 caps contain 180mg of elemental calcium.        UNABLE TO FIND      Take 2 capsules by mouth 3 times daily Muscle Mag. 2 caps contain B1 20mg, B2 20mg, B6 10mg, magesium 20mg, manganese 2mg.        UNABLE TO FIND      2 tablets 3 times daily MEDICATION NAME: Digestzymes    Thyroid cancer (H), Postsurgical hypothyroidism, Postsurgical hypoparathyroidism (H)       UNABLE TO FIND      1 tablet daily MEDICATION NAME: Pure Encapsulations    Thyroid cancer (H), Postsurgical hypothyroidism, Postsurgical hypoparathyroidism (H)       VENTOLIN  (90 BASE) MCG/ACT Inhaler   Generic drug:  albuterol     18 g    INHALE 2 PUFFS INTO THE LUNGS EVERY 4 HOURS AS NEEDED FOR SHORTNESS OF BREATH OR DIFFICULT BREATHING OR WHEEZING    Mild intermittent asthma without complication       vitamin D3 2000 UNITS Caps     60 capsule    Take 5,000  Units by mouth daily Takes 2 tabs daily    Thyroid cancer (H), Postsurgical hypothyroidism, Papillary carcinoma, follicular variant (H), Postsurgical hypoparathyroidism (H)       * Notice:  This list has 7 medication(s) that are the same as other medications prescribed for you. Read the directions carefully, and ask your doctor or other care provider to review them with you.

## 2018-03-22 NOTE — LETTER
3/22/2018       RE: Tisha Arias  [address] [place] [address]     Dear Colleague,    Thank you for referring your patient, Tisha Arias, to the [facility] WOUND C[facility] at [facility]. Please see a copy of my visit note below.    Chief Complaint:   Chief Complaint   Patient presents with     Consult     toenail avulsion        Subjective: Tisha is a 57 year old female who presents to the clinic today for evaluation of avulsed toenails secondary to chemotherapy. About a month ago, she lost about 5 toenails. They turned white and fell off by themselves. All nailbeds healed except the left big toe, which has been an issue for about 1 month. Because it wasn't healing, she has been applying silver nitrate 1 time daily, then covering with a padded bandage. It is tender when in shoes, so she has to wear open toed sandals. Since performing these cares, it has greatly improved. She is also concerned about her severe lower extremity edema, which also started after initiating chemotherapy. She bought compression stockings from [employer] which aren't helping. She is on 40 mg lasix daily.     ROS: Denies purulent drainage, redness, other wounds, rashes, foot or leg pain.    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 No real need for Epipen for this alone.     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 for: dog/M/T/G/W--NEGATIVE FOOD TESTS FOR: shrimp, crab, lobster, coconut     Past Surgical History:   Procedure Laterality Date     BACK SURGERY  ,    Bulging disc w nerve root impigment     BIOPSY BREAST      right     BIOPSY BREAST  11    right, core and sterotactic     BONE MARROW BIOPSY, BONE SPECIMEN, NEEDLE/TROCAR Left 10/3/2017    Procedure: BIOPSY BONE MARROW;  Bone Marrow Biopsy with aspirate;  Surgeon: Amelia Watkins PA-C;  Location: [facility] OR     BYPASS GASTRIC, CHOLECYSTECTOMY, COMBINED       C LAPAROSCOPIC GASTRIC RESTRICTIVE PX, W/GASTRIC BYPASS/ GAMALIEL-EN-Y, < 150CM      Gamaliel en Y?      SECTION       COLONOSCOPY      normal     COSMETIC SURGERY  2012    Final Stage of Mastectomy     DAVINCI HYSTERECTOMY TOTAL, BILATERAL SALPINGO-OOPHORECTOMY, COMBINED  2012    Procedure: COMBINED DAVINCI HYSTERECTOMY TOTAL, SALPINGO-OOPHORECTOMY;  Davinci Total Laparoscopic Hysterectomy, Bilateral Salpingo Oophorectomy, Pelvic Washings, Cystoscopy;  Surgeon: Evie Sheikh MD;  Location: [facility] OR     ENT SURGERY       ESOPHAGOSCOPY, GASTROSCOPY, DUODENOSCOPY (EGD), COMBINED N/A 2017    Procedure: COMBINED ENDOSCOPIC ULTRASOUND, ESOPHAGOSCOPY, GASTROSCOPY, DUODENOSCOPY (EGD), FINE NEEDLE ASPIRATE/BIOPSY;  Esophagogastroduodenoscopy, Endoscopic Ultrasound, with fine needle biopsy aspirate;  Surgeon: Patrick Robles MD;  Location: [facility] OR     GI SURGERY  2007    Gamaliel-en Y w cholecystectomy     GYN SURGERY  89,1/10/92    2 c-sections     HYSTERECTOMY, PAP NO LONGER INDICATED      DeVinci assister lap hyst with BSO     INSERT PORT VASCULAR ACCESS Right 10/4/2017    Procedure: INSERT PORT VASCULAR ACCESS;   Port Placement;  Surgeon: Jc Goodman PA-C;  Location: [facility] OR     IRRIGATION AND DEBRIDEMENT BREAST  3/1/2012    Procedure:IRRIGATION AND DEBRIDEMENT BREAST; Irrigation and Debridement, Wound Closure Right Breast; Surgeon:JUNG GUILLEN; Location:[facility] OR     MASTECTOMY, RECONSTRUCT BREAST, COMBINED  1/30/2012    Procedure:COMBINED MASTECTOMY, RECONSTRUCT BREAST; Bilateral Mastectomies, Right Axillary [place] Node Biopsy, Bilateral Breast Reconstruction with Tissue Expanders, Reconstruction of inframammary fold, bilateral pain management systems; Surgeon:ANUPAM CAMPBELL; Location:[facility] OR     RECONSTRUCT BREAST  8/31/2012    Procedure: RECONSTRUCT BREAST;  Bilateral 2nd stage breast reconstruction, revision,       SOFT TISSUE SURGERY  1-30-12    Mastectomy w severe myofascial pain syndrome     THYROIDECTOMY  7/10/2013    Procedure: THYROIDECTOMY;  Total Thyroidectomy with central neck dissection;  Surgeon: Indiana Sneed MD;  Location: [facility] OR     TONSILLECTOMY      childhood     Family History   Problem Relation Age of Onset     Allergies Mother      Arthritis Mother      CANCER Mother      uterine/uterine     Hypertension Mother      on HCTZ     Hyperlipidemia Mother      CEREBROVASCULAR DISEASE Mother      s/p brain surgery     Breast Cancer Mother      Depression Mother      Anxiety Disorder Mother      Anesthesia Reaction Mother      Asthma Mother      Skin Cancer Mother      HEART DISEASE Father      DIABETES Father      Coronary Artery Disease Father      Hypertension Father      Hyperlipidemia Father      CEREBROVASCULAR DISEASE Father      Multiple strokes     Obesity Father      DIABETES Brother      Depression Brother      Hypertension Brother      CANCER Maternal Grandfather      pancreatic cancer/stomach cancer     Prostate Cancer Maternal Grandfather      Prostrate and Bladder     Other Cancer Maternal Grandfather      Pancreatic 1988, Bladder 1977     CANCER Maternal Grandmother       uterine     Breast Cancer Maternal Grandmother      Skin Cancer Maternal Grandmother      CANCER Brother 57     pancreatic cancer     DIABETES Brother      Breast Cancer Cousin      Grand mothers sister     Other Cancer Brother      Stage 3 Pancreatic 2-2015     Depression Brother      Asthma Brother      Obesity Brother      Anesthesia Reaction Other      H/a, itching, drug interactions     Asthma Other      CANCER Brother      Pancreatic      Thyroid Disease No family hx of      Social History   Substance Use Topics     Smoking status: Never Smoker     Smokeless tobacco: Never Used      Comment: no 2nd hand smoke exposure     Alcohol use No      Comment: rare     Allergies   Allergen Reactions     Baclofen Other (See Comments) and Unknown     Other reaction(s): Edema, chest pain, seizures.      No Clinical Screening - See Comments Shortness Of Breath, Palpitations, Anaphylaxis, Itching, Swelling, Difficulty breathing and Rash     Sukhjinder wipes- oral allergy -  July 2015: throat tightness from a Chinese herbal medicine Guillen Raciel Blank Barry Tran     Serotonin Anxiety, Other (See Comments) and Swelling     Seizures      Tree Nuts [Nuts] Shortness Of Breath     Amitriptyline Hcl Swelling     Birch Trees      Potatoes, carrots, cherries, celery, apple, pears, plums, peaches, parsnip, kiwi, hazelnuts, and apricots,      Blue Dyes Itching     Headaches       Cymbalta Other (See Comments)     Flushing, tremor/muscle twitching and edema     Gabapentin Other (See Comments)     edema  Systemic edema, weaned off from Feb to March per Dr. Dowd.    edema     Grass      Mugwort [Artemisia Vulgaris]      Various spices     Ragweeds      Melons, bananas, cucumbers, zucchini.     Topamax      Nortriptyline Itching, Visual Disturbance, Swelling, GI Disturbance, Anxiety, Other (See Comments) and Nausea     Other reaction(s): Swelling     Current Outpatient Rx   Medication Sig Dispense Refill     ACAI RAMIREZ PO Take 50 mg by mouth        order for DME Compression socks - knee high 20-30 mmHg zippered if possible 1 Units 11     acyclovir (ZOVIRAX) 400 MG tablet Take 1 tablet (400 mg) by mouth 2 times daily 30 tablet 0     furosemide (LASIX) 20 MG tablet Take 1 tablet (20 mg) by mouth 2 times daily 60 tablet 1     potassium chloride SA (KLOR-CON) 20 MEQ CR tablet Take 1 tablet (20 mEq) by mouth 2 times daily 30 tablet 1     order for DME Equipment being ordered: compression stockings. 20-30 mm or 30 - 40 mm as patient can tolerate. Physical therapy to determine if they should be above or below the knee. 2 Units 4     order for DME Equipment being ordered: compression bra 2 Device 1     [START ON 3/24/2018] morphine (MS CONTIN) 30 MG 12 hr tablet Take 1 tablet (30 mg) by mouth every 8 hours . Take an addition pill at night such that your evening dose is 60mg 120 tablet 0     [START ON 3/24/2018] oxyCODONE IR (ROXICODONE) 30 MG tablet Take 1 tablet (30 mg) by mouth every 4 hours as needed for moderate to severe pain 180 tablet 0     lidocaine (XYLOCAINE) 5 % ointment Apply quarter size amount to chest and back up to 3 times daily as needed for pain. 350 g 1     lidocaine-prilocaine (EMLA) cream Apply topically as needed for moderate pain 60 g 1     diazepam (VALIUM) 5 MG tablet TAKE ONE TABLET BY MOUTH THREE TIMES DAILY AS NEEDED FOR MUSCLE SPASMS 90 tablet 1     cromolyn sodium (NASALCROM) 5.2 MG/ACT AERS Inhaler SPRAY ONE SPRAY( 1 ML) IN NOSTRIL DAILY 1 Bottle 6     lidocaine-prilocaine (EMLA) cream Apply topically as needed (port access pain.) 30 g 1     [employer] FIBER GUMMY BEARS CHILDREN CHEW Take 5 g by mouth daily (2 gummy= 5 g =1 serving) 120 tablet 3     methocarbamol (ROBAXIN) 750 MG tablet Take 1 tablet (750 mg) by mouth 4 times daily . Ok to take a 5th Robaxin on very severe days.       diclofenac (VOLTAREN) 1 % GEL topical gel Apply affected area two times daily PRN using enclosed dosing card. 100 g 0     celecoxib (CELEBREX) 200 MG capsule Take  1 capsule (200 mg) by mouth 2 times daily 56 capsule 0     ondansetron (ZOFRAN-ODT) 8 MG ODT tab Take 1 tablet (8 mg) by mouth every 8 hours as needed (Patient taking differently: Take 8 mg by mouth every 8 hours ) 30 tablet 1     cetirizine (ZYRTEC ALLERGY) 10 MG tablet Take 1 tablet (10 mg) by mouth 3 times daily On hold for lab test. 90 tablet 0     hydrochlorothiazide (MICROZIDE) 12.5 MG capsule Take 1 capsule (12.5 mg) by mouth daily 30 capsule 0     calcium citrate-vitamin D (CALCIUM CITRATE +D) 315-250 MG-UNIT TABS per tablet Take 2 tablets by mouth twice a week 20 tablet 0     cyclobenzaprine (FLEXERIL) 10 MG tablet Take 1 tablet (10 mg) by mouth 3 times daily as needed (Patient taking differently: Take 10 mg by mouth 3 times daily ) 30 tablet 0     ranitidine (ZANTAC) 75 MG tablet Take 1 tablet (75 mg) by mouth 3 times daily 90 tablet 0     Cholecalciferol (VITAMIN D3) 2000 UNITS CAPS Take 5,000 Units by mouth daily Takes 2 tabs daily 60 capsule 0     furosemide (LASIX) 20 MG tablet Take 1 tablet (20 mg) by mouth 2 times daily 60 tablet 0     calcitRIOL (ROCALTROL) 0.25 MCG capsule TAKE 2 CAPSULES BY MOUTH EVERY MORNING AND TAKE 1 CAPSULE BY MOUTH EVERY NIGHT AT BEDTIME (Patient taking differently: Take 0.25 mcg by mouth 2 times daily TAKE 2 CAPSULES BY MOUTH EVERY MORNING AND TAKE 1 CAPSULE BY MOUTH EVERY NIGHT AT NOON.) 90 capsule 0     cromolyn (OPTICROM) 4 % ophthalmic solution Place 1 drop into both eyes 4 times daily 10 mL 0     montelukast (SINGULAIR) 10 MG tablet Take 2 tablets (20 mg) by mouth 2 times daily 60 tablet 0     magic mouthwash suspension (diphenhydrAMINE, lidocaine, aluminum-magnesium & simethicone) Swish and spit 10 mLs in mouth every 6 hours as needed for mouth sores 360 mL 1     prochlorperazine (COMPAZINE) 10 MG tablet Take 10 mg by mouth as needed  3     SUMAtriptan (IMITREX) 50 MG tablet Take 50 mg by mouth as needed  3     acetaminophen (TYLENOL) 325 MG tablet Take 2 tablets (650  mg) by mouth every 4 hours as needed for mild pain or fever 100 tablet      triamcinolone (KENALOG) 0.025 % ointment Apply topically as needed  3     bisacodyl (DULCOLAX) 5 MG EC tablet Take 3 tablets (15 mg) by mouth daily as needed for constipation 30 tablet 0     polyethylene glycol (MIRALAX) powder Take 17 g (1 capful) by mouth daily 510 g 0     senna-docusate (SENOKOT-S;PERICOLACE) 8.6-50 MG per tablet Take 1-2 tablets by mouth 2 times daily as needed for constipation 60 tablet 0     UNABLE TO FIND Take 2 capsules by mouth 3 times daily Muscle Mag. 2 caps contain B1 20mg, B2 20mg, B6 10mg, magesium 20mg, manganese 2mg.       order for DME Compression stockings, medium grade, please measure and fit. Please dispense a pair. 1 Units 0     COMPOUND CONTAINING CONTROLLED SUBSTANCE (CMPD RX) - PHARMACY TO MIX COMPOUNDED MEDICATION Apply small amount to affected area two times daily. 120 g 6     levothyroxine (SYNTHROID/LEVOTHROID) 112 MCG tablet Mon-Sat: (2 tabs daily) Sunday: 3 tabs (Patient taking differently: 112 mcg Mon-Sat: (2 tabs daily) Sunday: 3 tabs) 189 tablet 3     EPINEPHrine (EPIPEN 2-CARIDAD) 0.3 MG/0.3ML injection Inject 0.3 mLs (0.3 mg) into the muscle once as needed for anaphylaxis 0.6 mL 3     VENTOLIN  (90 BASE) MCG/ACT Inhaler INHALE 2 PUFFS INTO THE LUNGS EVERY 4 HOURS AS NEEDED FOR SHORTNESS OF BREATH OR DIFFICULT BREATHING OR WHEEZING 18 g 3     aluminum chloride (DRYSOL) 20 % external solution Apply topically At Bedtime 60 mL 3     UNABLE TO FIND 3 tablets 3 times daily MEDICATION NAME: calcium D-Glucarate   3 caps contain 180mg of elemental calcium.       Triamcinolone Acetonide (AZMACORT IN) Inhale 2 puffs into the lungs as needed       UNABLE TO FIND 2 tablets 3 times daily MEDICATION NAME: Digestzymes        UNABLE TO FIND 1 tablet daily MEDICATION NAME: Pure Encapsulations       Probiotic Product (PROBIOTIC DAILY PO) Take 1 capsule by mouth daily Lacto acid bifidobacterium          Objective:   There were no vitals taken for this visit.    General: Patient is well groomed, appears stated age, is alert, cooperative and in no acute distress.  Vascular: DP and PT pulses are non-palpable bilaterally secondary to edema. LE capillary refill time is <3 seconds. Absent pedal hair. Severe non-pitting edema bilaterally.   Neurologic: Gross sensation intact to bilateral LE.   Skin: Skin of LEs tight and shiny. LE skin color, texture and turgor are normal. Toenails are normal bilaterally except for L toenail. Nail has grown back about 50%. Remaining distal nail bed is almost completely healed, except one small superficial area on lateral bed. Minimal serous drainage. Mild tenderness to palpation. No erythema, edema, calor, purulent drainage, odor.     Assessment:   - Nail avulsion secondary to chemotherapy  - BL lower extremity edema    Plan:   - Pt seen and evaluated. Diagnosis and treatment options were discussed. Nail bed looks healthy and is almost healed. No signs of infection.  - Wound care instructions: Cover with antibiotic ointment and a bandaid until scabbed over. Keep padded bandage on to prevent pain from pressure. No need to continue silver nitrate.  - Dispensed extra wound care supplies, including mepilix, to patient.  - Gave order for compression stockings 20-30 mmHg. Follow up with oncology clinic to discuss lasix dose.  - Pt to return to clinic as needed        Again, thank you for allowing me to participate in the care of your patient.      Sincerely,    Symone Horan PA-C

## 2018-03-22 NOTE — PROGRESS NOTES
Chief Complaint:   Chief Complaint   Patient presents with     Consult     toenail avulsion        Subjective: Tisha is a 57 year old female who presents to the clinic today for evaluation of avulsed toenails secondary to chemotherapy. About a month ago, she lost about 5 toenails. They turned white and fell off by themselves. All nailbeds healed except the left big toe, which has been an issue for about 1 month. Because it wasn't healing, she has been applying silver nitrate 1 time daily, then covering with a padded bandage. It is tender when in shoes, so she has to wear open toed sandals. Since performing these cares, it has greatly improved. She is also concerned about her severe lower extremity edema, which also started after initiating chemotherapy. She bought compression stockings from [employer] which aren't helping. She is on 40 mg lasix daily.     ROS: Denies purulent drainage, redness, other wounds, rashes, foot or leg pain.    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 No real need for Epipen for this alone.     GDM (gestational diabetes mellitus)     w first pregnancy only     Gestational hypertension      Lymphedema     ajckson arms, chest     Migraine      Neuropathy associated with cancer (H)      Papillary carcinoma, follicular variant (H) 6/28/2013    pT3, N1, Mx, thyroid     Post-surgical hypothyroidism      Renal disease     kidney stones     Rhinitis, allergic to other allergen      Right Breast mass and microcalcifications 12/13/2011     Seasonal allergic conjunctivitis      Seasonal allergic rhinitis 4/12/11 skin tests pos. for: dog/M/T/G/W--NEGATIVE FOOD TESTS FOR: shrimp, crab,  lobster, coconut     Past Surgical History:   Procedure Laterality Date     BACK SURGERY  ,    Bulging disc w nerve root impigment     BIOPSY BREAST      right     BIOPSY BREAST  11    right, core and sterotactic     BONE MARROW BIOPSY, BONE SPECIMEN, NEEDLE/TROCAR Left 10/3/2017    Procedure: BIOPSY BONE MARROW;  Bone Marrow Biopsy with aspirate;  Surgeon: Amelia Watkins PA-C;  [facility]: [facility]     BYPASS GASTRIC, CHOLECYSTECTOMY, COMBINED       C LAPAROSCOPIC GASTRIC RESTRICTIVE PX, W/GASTRIC BYPASS/ GAMALIEL-EN-Y, < 150CM      Gamaliel en Y?      SECTION       COLONOSCOPY      normal     COSMETIC SURGERY  2012    Final Stage of Mastectomy     DAVINCI HYSTERECTOMY TOTAL, BILATERAL SALPINGO-OOPHORECTOMY, COMBINED  2012    Procedure: COMBINED DAVINCI HYSTERECTOMY TOTAL, SALPINGO-OOPHORECTOMY;  Davinci Total Laparoscopic Hysterectomy, Bilateral Salpingo Oophorectomy, Pelvic Washings, Cystoscopy;  Surgeon: Evie Sheikh MD;  Location: [facility] OR     ENT SURGERY       ESOPHAGOSCOPY, GASTROSCOPY, DUODENOSCOPY (EGD), COMBINED N/A 2017    Procedure: COMBINED ENDOSCOPIC ULTRASOUND, ESOPHAGOSCOPY, GASTROSCOPY, DUODENOSCOPY (EGD), FINE NEEDLE ASPIRATE/BIOPSY;  Esophagogastroduodenoscopy, Endoscopic Ultrasound, with fine needle biopsy aspirate;  Surgeon: Patrick Robles MD;  Location: [facility] OR     GI SURGERY  2007    Gamaliel-en Y w cholecystectomy     GYN SURGERY  89,1/10/92    2 c-sections     HYSTERECTOMY, PAP NO LONGER INDICATED      DeVinci assister lap hyst with BSO     INSERT PORT VASCULAR ACCESS Right 10/4/2017    Procedure: INSERT PORT VASCULAR ACCESS;  Port Placement;  Surgeon: Jc Goodman PA-C;  Location: [facility]     IRRIGATION AND DEBRIDEMENT BREAST  3/1/2012    Procedure:IRRIGATION AND DEBRIDEMENT BREAST; Irrigation and Debridement, Wound Closure Right Breast; Surgeon:JUNG GUILLEN; Location:[facility] OR     MASTECTOMY, RECONSTRUCT  BREAST, COMBINED  1/30/2012    Procedure:COMBINED MASTECTOMY, RECONSTRUCT BREAST; Bilateral Mastectomies, Right Axillary [place] Node Biopsy, Bilateral Breast Reconstruction with Tissue Expanders, Reconstruction of inframammary fold, bilateral pain management systems; Surgeon:ANUPAM CAMPBELL; Location:[facility]     RECONSTRUCT BREAST  8/31/2012    Procedure: RECONSTRUCT BREAST;  Bilateral 2nd stage breast reconstruction, revision,       SOFT TISSUE SURGERY  1-30-12    Mastectomy w severe myofascial pain syndrome     THYROIDECTOMY  7/10/2013    Procedure: THYROIDECTOMY;  Total Thyroidectomy with central neck dissection;  Surgeon: Indiana Sneed MD;  Location: [facility]     TONSILLECTOMY      childhood     Family History   Problem Relation Age of Onset     Allergies Mother      Arthritis Mother      CANCER Mother      uterine/uterine     Hypertension Mother      on HCTZ     Hyperlipidemia Mother      CEREBROVASCULAR DISEASE Mother      s/p brain surgery     Breast Cancer Mother      Depression Mother      Anxiety Disorder Mother      Anesthesia Reaction Mother      Asthma Mother      Skin Cancer Mother      HEART DISEASE Father      DIABETES Father      Coronary Artery Disease Father      Hypertension Father      Hyperlipidemia Father      CEREBROVASCULAR DISEASE Father      Multiple strokes     Obesity Father      DIABETES Brother      Depression Brother      Hypertension Brother      CANCER Maternal Grandfather      pancreatic cancer/stomach cancer     Prostate Cancer Maternal Grandfather      Prostrate and Bladder     Other Cancer Maternal Grandfather      Pancreatic 1988, Bladder 1977     CANCER Maternal Grandmother      uterine     Breast Cancer Maternal Grandmother      Skin Cancer Maternal Grandmother      CANCER Brother 57     pancreatic cancer     DIABETES Brother      Breast Cancer Cousin      Grand mothers sister     Other Cancer Brother      Stage 3 Pancreatic 2-2015     Depression Brother      Asthma  Brother      Obesity Brother      Anesthesia Reaction Other      H/a, itching, drug interactions     Asthma Other      CANCER Brother      Pancreatic      Thyroid Disease No family hx of      Social History   Substance Use Topics     Smoking status: Never Smoker     Smokeless tobacco: Never Used      Comment: no 2nd hand smoke exposure     Alcohol use No      Comment: rare     Allergies   Allergen Reactions     Baclofen Other (See Comments) and Unknown     Other reaction(s): Edema, chest pain, seizures.      No Clinical Screening - See Comments Shortness Of Breath, Palpitations, Anaphylaxis, Itching, Swelling, Difficulty breathing and Rash     Sukhjinder wipes- oral allergy -  July 2015: throat tightness from a Chinese herbal medicine Wilmer Blank Barry Tran     Serotonin Anxiety, Other (See Comments) and Swelling     Seizures      Tree Nuts [Nuts] Shortness Of Breath     Amitriptyline Hcl Swelling     Birch Trees      Potatoes, carrots, cherries, celery, apple, pears, plums, peaches, parsnip, kiwi, hazelnuts, and apricots,      Blue Dyes Itching     Headaches       Cymbalta Other (See Comments)     Flushing, tremor/muscle twitching and edema     Gabapentin Other (See Comments)     edema  Systemic edema, weaned off from Feb to March per Dr. Dowd.    edema     Grass      Mugwort [Artemisia Vulgaris]      Various spices     Ragweeds      Melons, bananas, cucumbers, zucchini.     Topamax      Nortriptyline Itching, Visual Disturbance, Swelling, GI Disturbance, Anxiety, Other (See Comments) and Nausea     Other reaction(s): Swelling     Current Outpatient Rx   Medication Sig Dispense Refill     ACAI RAMIREZ PO Take 50 mg by mouth       order for DME Compression socks - knee high 20-30 mmHg zippered if possible 1 Units 11     acyclovir (ZOVIRAX) 400 MG tablet Take 1 tablet (400 mg) by mouth 2 times daily 30 tablet 0     furosemide (LASIX) 20 MG tablet Take 1 tablet (20 mg) by mouth 2 times daily 60 tablet 1     potassium  chloride SA (KLOR-CON) 20 MEQ CR tablet Take 1 tablet (20 mEq) by mouth 2 times daily 30 tablet 1     order for DME Equipment being ordered: compression stockings. 20-30 mm or 30 - 40 mm as patient can tolerate. Physical therapy to determine if they should be above or below the knee. 2 Units 4     order for DME Equipment being ordered: compression bra 2 Device 1     [START ON 3/24/2018] morphine (MS CONTIN) 30 MG 12 hr tablet Take 1 tablet (30 mg) by mouth every 8 hours . Take an addition pill at night such that your evening dose is 60mg 120 tablet 0     [START ON 3/24/2018] oxyCODONE IR (ROXICODONE) 30 MG tablet Take 1 tablet (30 mg) by mouth every 4 hours as needed for moderate to severe pain 180 tablet 0     lidocaine (XYLOCAINE) 5 % ointment Apply quarter size amount to chest and back up to 3 times daily as needed for pain. 350 g 1     lidocaine-prilocaine (EMLA) cream Apply topically as needed for moderate pain 60 g 1     diazepam (VALIUM) 5 MG tablet TAKE ONE TABLET BY MOUTH THREE TIMES DAILY AS NEEDED FOR MUSCLE SPASMS 90 tablet 1     cromolyn sodium (NASALCROM) 5.2 MG/ACT AERS Inhaler SPRAY ONE SPRAY( 1 ML) IN NOSTRIL DAILY 1 Bottle 6     lidocaine-prilocaine (EMLA) cream Apply topically as needed (port access pain.) 30 g 1     [employer] FIBER GUMMY BEARS CHILDREN CHEW Take 5 g by mouth daily (2 gummy= 5 g =1 serving) 120 tablet 3     methocarbamol (ROBAXIN) 750 MG tablet Take 1 tablet (750 mg) by mouth 4 times daily . Ok to take a 5th Robaxin on very severe days.       diclofenac (VOLTAREN) 1 % GEL topical gel Apply affected area two times daily PRN using enclosed dosing card. 100 g 0     celecoxib (CELEBREX) 200 MG capsule Take 1 capsule (200 mg) by mouth 2 times daily 56 capsule 0     ondansetron (ZOFRAN-ODT) 8 MG ODT tab Take 1 tablet (8 mg) by mouth every 8 hours as needed (Patient taking differently: Take 8 mg by mouth every 8 hours ) 30 tablet 1     cetirizine (ZYRTEC ALLERGY) 10 MG tablet Take 1 tablet (10  mg) by mouth 3 times daily On hold for lab test. 90 tablet 0     hydrochlorothiazide (MICROZIDE) 12.5 MG capsule Take 1 capsule (12.5 mg) by mouth daily 30 capsule 0     calcium citrate-vitamin D (CALCIUM CITRATE +D) 315-250 MG-UNIT TABS per tablet Take 2 tablets by mouth twice a week 20 tablet 0     cyclobenzaprine (FLEXERIL) 10 MG tablet Take 1 tablet (10 mg) by mouth 3 times daily as needed (Patient taking differently: Take 10 mg by mouth 3 times daily ) 30 tablet 0     ranitidine (ZANTAC) 75 MG tablet Take 1 tablet (75 mg) by mouth 3 times daily 90 tablet 0     Cholecalciferol (VITAMIN D3) 2000 UNITS CAPS Take 5,000 Units by mouth daily Takes 2 tabs daily 60 capsule 0     furosemide (LASIX) 20 MG tablet Take 1 tablet (20 mg) by mouth 2 times daily 60 tablet 0     calcitRIOL (ROCALTROL) 0.25 MCG capsule TAKE 2 CAPSULES BY MOUTH EVERY MORNING AND TAKE 1 CAPSULE BY MOUTH EVERY NIGHT AT BEDTIME (Patient taking differently: Take 0.25 mcg by mouth 2 times daily TAKE 2 CAPSULES BY MOUTH EVERY MORNING AND TAKE 1 CAPSULE BY MOUTH EVERY NIGHT AT NOON.) 90 capsule 0     cromolyn (OPTICROM) 4 % ophthalmic solution Place 1 drop into both eyes 4 times daily 10 mL 0     montelukast (SINGULAIR) 10 MG tablet Take 2 tablets (20 mg) by mouth 2 times daily 60 tablet 0     magic mouthwash suspension (diphenhydrAMINE, lidocaine, aluminum-magnesium & simethicone) Swish and spit 10 mLs in mouth every 6 hours as needed for mouth sores 360 mL 1     prochlorperazine (COMPAZINE) 10 MG tablet Take 10 mg by mouth as needed  3     SUMAtriptan (IMITREX) 50 MG tablet Take 50 mg by mouth as needed  3     acetaminophen (TYLENOL) 325 MG tablet Take 2 tablets (650 mg) by mouth every 4 hours as needed for mild pain or fever 100 tablet      triamcinolone (KENALOG) 0.025 % ointment Apply topically as needed  3     bisacodyl (DULCOLAX) 5 MG EC tablet Take 3 tablets (15 mg) by mouth daily as needed for constipation 30 tablet 0     polyethylene glycol  (MIRALAX) powder Take 17 g (1 capful) by mouth daily 510 g 0     senna-docusate (SENOKOT-S;PERICOLACE) 8.6-50 MG per tablet Take 1-2 tablets by mouth 2 times daily as needed for constipation 60 tablet 0     UNABLE TO FIND Take 2 capsules by mouth 3 times daily Muscle Mag. 2 caps contain B1 20mg, B2 20mg, B6 10mg, magesium 20mg, manganese 2mg.       order for DME Compression stockings, medium grade, please measure and fit. Please dispense a pair. 1 Units 0     COMPOUND CONTAINING CONTROLLED SUBSTANCE (CMPD RX) - PHARMACY TO MIX COMPOUNDED MEDICATION Apply small amount to affected area two times daily. 120 g 6     levothyroxine (SYNTHROID/LEVOTHROID) 112 MCG tablet Mon-Sat: (2 tabs daily) Sunday: 3 tabs (Patient taking differently: 112 mcg Mon-Sat: (2 tabs daily) Sunday: 3 tabs) 189 tablet 3     EPINEPHrine (EPIPEN 2-CARIDAD) 0.3 MG/0.3ML injection Inject 0.3 mLs (0.3 mg) into the muscle once as needed for anaphylaxis 0.6 mL 3     VENTOLIN  (90 BASE) MCG/ACT Inhaler INHALE 2 PUFFS INTO THE LUNGS EVERY 4 HOURS AS NEEDED FOR SHORTNESS OF BREATH OR DIFFICULT BREATHING OR WHEEZING 18 g 3     aluminum chloride (DRYSOL) 20 % external solution Apply topically At Bedtime 60 mL 3     UNABLE TO FIND 3 tablets 3 times daily MEDICATION NAME: calcium D-Glucarate   3 caps contain 180mg of elemental calcium.       Triamcinolone Acetonide (AZMACORT IN) Inhale 2 puffs into the lungs as needed       UNABLE TO FIND 2 tablets 3 times daily MEDICATION NAME: Digestzymes        UNABLE TO FIND 1 tablet daily MEDICATION NAME: Pure Encapsulations       Probiotic Product (PROBIOTIC DAILY PO) Take 1 capsule by mouth daily Lacto acid bifidobacterium         Objective:   There were no vitals taken for this visit.    General: Patient is well groomed, appears stated age, is alert, cooperative and in no acute distress.  Vascular: DP and PT pulses are non-palpable bilaterally secondary to edema. LE capillary refill time is <3 seconds. Absent pedal  hair. Severe non-pitting edema bilaterally.   Neurologic: Gross sensation intact to bilateral LE.   Skin: Skin of LEs tight and shiny. LE skin color, texture and turgor are normal. Toenails are normal bilaterally except for L toenail. Nail has grown back about 50%. Remaining distal nail bed is almost completely healed, except one small superficial area on lateral bed. Minimal serous drainage. Mild tenderness to palpation. No erythema, edema, calor, purulent drainage, odor.     Assessment:   - Nail avulsion secondary to chemotherapy  - BL lower extremity edema    Plan:   - Pt seen and evaluated. Diagnosis and treatment options were discussed. Nail bed looks healthy and is almost healed. No signs of infection.  - Wound care instructions: Cover with antibiotic ointment and a bandaid until scabbed over. Keep padded bandage on to prevent pain from pressure. No need to continue silver nitrate.  - Dispensed extra wound care supplies, including mepilix, to patient.  - Gave order for compression stockings 20-30 mmHg. Follow up with oncology clinic to discuss lasix dose.  - Pt to return to clinic as needed

## 2018-04-09 DIAGNOSIS — E87.6 HYPOKALEMIA: ICD-10-CM

## 2018-04-09 RX ORDER — POTASSIUM CHLORIDE 1500 MG/1
20 TABLET, EXTENDED RELEASE ORAL 2 TIMES DAILY
Qty: 60 TABLET | Refills: 3 | Status: SHIPPED | OUTPATIENT
Start: 2018-04-09 | End: 2019-03-20

## 2018-04-11 DIAGNOSIS — R07.89 CHEST WALL PAIN: ICD-10-CM

## 2018-04-11 DIAGNOSIS — C85.10 HIGH GRADE B-CELL LYMPHOMA (H): ICD-10-CM

## 2018-04-11 DIAGNOSIS — G89.3 NEOPLASM RELATED PAIN: ICD-10-CM

## 2018-04-11 RX ORDER — DIAZEPAM 5 MG
5 TABLET ORAL 3 TIMES DAILY PRN
Qty: 90 TABLET | Refills: 1 | Status: SHIPPED | OUTPATIENT
Start: 2018-04-11 | End: 2018-05-24

## 2018-04-11 RX ORDER — MORPHINE SULFATE 30 MG/1
30 TABLET, FILM COATED, EXTENDED RELEASE ORAL EVERY 8 HOURS
Qty: 120 TABLET | Refills: 0 | Status: SHIPPED | OUTPATIENT
Start: 2018-04-21 | End: 2018-05-16

## 2018-04-11 RX ORDER — OXYCODONE HYDROCHLORIDE 30 MG/1
30 TABLET ORAL EVERY 4 HOURS PRN
Qty: 180 TABLET | Refills: 0 | Status: SHIPPED | OUTPATIENT
Start: 2018-04-21 | End: 2018-05-16

## 2018-04-11 NOTE — TELEPHONE ENCOUNTER
Received VM from patient requesting refills of diazepam (which she reports she needs now) and MS Contin and oxycodone (which she notes she is asking for early).     Last refill diazepam: 2/7/2018  Last refills MS Contin and oxycodone: 3/24/2018  Last office visit: 2/28/2018  Scheduled for follow up 6/6/2018     Will route request to MD for review. Diazepam needs to be faxed to [facility] - other scripts okay to go to [facility], but patient would like a call advising when this is done and of fill dates.    Reviewed [place]  Report.

## 2018-04-19 ENCOUNTER — OFFICE VISIT (OUTPATIENT)
Dept: FAMILY MEDICINE | Facility: CLINIC | Age: 58
End: 2018-04-19
Payer: COMMERCIAL

## 2018-04-19 VITALS
HEART RATE: 103 BPM | SYSTOLIC BLOOD PRESSURE: 102 MMHG | TEMPERATURE: 98.7 F | OXYGEN SATURATION: 96 % | DIASTOLIC BLOOD PRESSURE: 72 MMHG | RESPIRATION RATE: 14 BRPM

## 2018-04-19 DIAGNOSIS — J32.9 BACTERIAL SINUSITIS: Primary | ICD-10-CM

## 2018-04-19 DIAGNOSIS — B96.89 BACTERIAL SINUSITIS: Primary | ICD-10-CM

## 2018-04-19 DIAGNOSIS — Z92.21 STATUS POST CHEMOTHERAPY: ICD-10-CM

## 2018-04-19 LAB
BASOPHILS # BLD AUTO: 0 10E9/L (ref 0–0.2)
BASOPHILS NFR BLD AUTO: 0.6 %
DIFFERENTIAL METHOD BLD: ABNORMAL
EOSINOPHIL # BLD AUTO: 0.1 10E9/L (ref 0–0.7)
EOSINOPHIL NFR BLD AUTO: 4.3 %
ERYTHROCYTE [DISTWIDTH] IN BLOOD BY AUTOMATED COUNT: 11.9 % (ref 10–15)
HCT VFR BLD AUTO: 38.7 % (ref 35–47)
HGB BLD-MCNC: 12.8 G/DL (ref 11.7–15.7)
LYMPHOCYTES # BLD AUTO: 0.9 10E9/L (ref 0.8–5.3)
LYMPHOCYTES NFR BLD AUTO: 26.1 %
MCH RBC QN AUTO: 32.9 PG (ref 26.5–33)
MCHC RBC AUTO-ENTMCNC: 33.1 G/DL (ref 31.5–36.5)
MCV RBC AUTO: 100 FL (ref 78–100)
MONOCYTES # BLD AUTO: 0.5 10E9/L (ref 0–1.3)
MONOCYTES NFR BLD AUTO: 14.6 %
NEUTROPHILS # BLD AUTO: 1.8 10E9/L (ref 1.6–8.3)
NEUTROPHILS NFR BLD AUTO: 54.4 %
PLATELET # BLD AUTO: 219 10E9/L (ref 150–450)
RBC # BLD AUTO: 3.89 10E12/L (ref 3.8–5.2)
WBC # BLD AUTO: 3.3 10E9/L (ref 4–11)

## 2018-04-19 PROCEDURE — 99213 OFFICE O/P EST LOW 20 MIN: CPT | Performed by: FAMILY MEDICINE

## 2018-04-19 PROCEDURE — 85025 COMPLETE CBC W/AUTO DIFF WBC: CPT | Performed by: FAMILY MEDICINE

## 2018-04-19 PROCEDURE — 36415 COLL VENOUS BLD VENIPUNCTURE: CPT | Performed by: FAMILY MEDICINE

## 2018-04-19 RX ORDER — LEVOFLOXACIN 750 MG/1
750 TABLET, FILM COATED ORAL DAILY
Qty: 10 TABLET | Refills: 0 | Status: SHIPPED | OUTPATIENT
Start: 2018-04-19 | End: 2019-02-18

## 2018-04-19 RX ORDER — FLUTICASONE PROPIONATE 50 MCG
1-2 SPRAY, SUSPENSION (ML) NASAL DAILY
Qty: 1 BOTTLE | Refills: 11 | Status: SHIPPED | OUTPATIENT
Start: 2018-04-19 | End: 2019-03-20

## 2018-04-19 NOTE — PATIENT INSTRUCTIONS
Acute Bacterial Rhinosinusitis (ABRS)    Acute bacterial rhinosinusitis (ABRS) is an infection of your nasal cavity and sinuses. It s caused by bacteria. Acute means that you ve had symptoms for less than 4 weeks, but possibly up to 12 weeks.  Understanding your sinuses  The nasal cavity is the large air-filled space behind your nose. The sinuses are a group of spaces formed by the bones of your face. They connect with your nasal cavity. ABRS causes the tissue lining these spaces to become inflamed. Mucus may not drain normally. This leads to facial pain and other symptoms.  What causes ABRS?  ABRS most often follows an upper respiratory infection caused by a virus. Bacteria then infect the lining of your nasal cavity and sinuses. But you can also get ABRS if you have:    Nasal allergies    Long-term nasal swelling and congestion not caused by allergies    Blockage in the nose  Symptoms of ABRS  The symptoms of ABRS may be different for each person and include:    Nasal congestion or blockage    Pain or pressure in the face    Thick, colored drainage from the nose  Other symptoms may include:    Runny nose    Fluid draining from the nose down the throat (postnasal drip)    Headache    Cough    Pain    Fever  Diagnosing ABRS  ABRS may be diagnosed if you ve had an upper respiratory infection like a cold and cough for 10 or more days without improvement or with worsening symptoms. Your healthcare provider will ask about your symptoms and your medical history. The provider will check your vital signs, including your temperature. You ll have a physical exam. The healthcare provider will check your ears, nose, and throat. You likely won t need any tests. If ABRS comes back, you may have a culture or other tests.  Treatment for ABRS  Treatment may include:    Antibiotic medicine. This is for symptoms that last for at least 10 to 14 days.    Nasal corticosteroid medicine. Drops or spray used in the nose can lessen  swelling and congestion.    Over-the-counter pain medicine. This is to lessen sinus pain and pressure.    Nasal decongestant medicine. Spray or drops may help to lessen congestion. Do not use them for more than a few days.    Salt wash (saline irrigation). This can help to loosen mucus.  Possible complications of ABRS  ABRS may come back or become long-term (chronic). In rare cases, ABRS may cause complications such as:     Inflamed tissue around the brain and spinal cord (meningitis)    Inflamed tissue around the eyes (orbital cellulitis)    Inflamed bones around the sinuses (osteitis)  These problems may need to be treated in a hospital with intravenous (IV) antibiotic medicine or surgery.  When to call the healthcare provider  Call your healthcare provider if you have any of the following:    Symptoms that don t get better, or get worse    Symptoms that don t get better after 3 to 5 days on antibiotics    Trouble seeing    Swelling around your eyes    Confusion or trouble staying awake   Date Last Reviewed: 5/1/2017 2000-2017 [employer]. [address] [place] [address]. All rights reserved. This information is not intended as a substitute for professional medical care. Always follow your healthcare professional's instructions.      [facility]    If you have any questions regarding to your visit please contact your care team:       Team Purple:   Clinic Hours Telephone Number   Dr. Chandni Zarco   7am-7pm  Monday - Thursday   7am-5pm  Fridays  [phone]  (Appointment scheduling available 24/7)    Questions about your Visit?   Team Line:  [phone]   Urgent Care - [place] - 11am-9pm Monday-Friday Saturday-Sunday- 9am-5pm   [place] - 5pm-9pm Monday-Friday Saturday-Sunday- 9am-5pm  [phone] - M[facility]akat  [phone] - [facility]       What options do I have for visits at  the clinic other than the traditional office visit?  To expand how we care for you, many of our providers are utilizing electronic visits (e-visits) and telephone visits, when medically appropriate, for interactions with their patients rather than a visit in the clinic.   We also offer nurse visits for many medical concerns. Just like any other service, we will bill your insurance company for this type of visit based on time spent on the phone with your provider. Not all insurance companies cover these visits. Please check with your medical insurance if this type of visit is covered. You will be responsible for any charges that are not paid by your insurance.      E-visits via [employer]:  generally incur a $35.00 fee.  Telephone visits:  Time spent on the phone: *charged based on time that is spent on the phone in increments of 10 minutes. Estimated cost:   5-10 mins $30.00   11-20 mins. $59.00   21-30 mins. $85.00     Use [employer] (secure email communication and access to your chart) to send your primary care provider a message or make an appointment. Ask someone on your Team how to sign up for [employer].  For a Price Quote for your services, please call our Consumer Price Line at [phone].  As always, Thank you for trusting us with your health care needs!    Nelly Dias MA

## 2018-04-19 NOTE — MR AVS SNAPSHOT
After Visit Summary   4/19/2018    Tisha Arias    MRN: [id number]           Patient Information     Date Of Birth          1960        Visit Information        Provider Department      4/19/2018 4:40 PM Pablo Soto MD [facility]        Today's Diagnoses     Need for prophylactic vaccination with tetanus-diphtheria (TD)    -  1    Bacterial sinusitis        Status post chemotherapy          Care Instructions      Acute Bacterial Rhinosinusitis (ABRS)    Acute bacterial rhinosinusitis (ABRS) is an infection of your nasal cavity and sinuses. It s caused by bacteria. Acute means that you ve had symptoms for less than 4 weeks, but possibly up to 12 weeks.  Understanding your sinuses  The nasal cavity is the large air-filled space behind your nose. The sinuses are a group of spaces formed by the bones of your face. They connect with your nasal cavity. ABRS causes the tissue lining these spaces to become inflamed. Mucus may not drain normally. This leads to facial pain and other symptoms.  What causes ABRS?  ABRS most often follows an upper respiratory infection caused by a virus. Bacteria then infect the lining of your nasal cavity and sinuses. But you can also get ABRS if you have:    Nasal allergies    Long-term nasal swelling and congestion not caused by allergies    Blockage in the nose  Symptoms of ABRS  The symptoms of ABRS may be different for each person and include:    Nasal congestion or blockage    Pain or pressure in the face    Thick, colored drainage from the nose  Other symptoms may include:    Runny nose    Fluid draining from the nose down the throat (postnasal drip)    Headache    Cough    Pain    Fever  Diagnosing ABRS  ABRS may be diagnosed if you ve had an upper respiratory infection like a cold and cough for 10 or more days without improvement or with worsening symptoms. Your healthcare provider will ask about your symptoms and your  medical history. The provider will check your vital signs, including your temperature. You ll have a physical exam. The healthcare provider will check your ears, nose, and throat. You likely won t need any tests. If ABRS comes back, you may have a culture or other tests.  Treatment for ABRS  Treatment may include:    Antibiotic medicine. This is for symptoms that last for at least 10 to 14 days.    Nasal corticosteroid medicine. Drops or spray used in the nose can lessen swelling and congestion.    Over-the-counter pain medicine. This is to lessen sinus pain and pressure.    Nasal decongestant medicine. Spray or drops may help to lessen congestion. Do not use them for more than a few days.    Salt wash (saline irrigation). This can help to loosen mucus.  Possible complications of ABRS  ABRS may come back or become long-term (chronic). In rare cases, ABRS may cause complications such as:     Inflamed tissue around the brain and spinal cord (meningitis)    Inflamed tissue around the eyes (orbital cellulitis)    Inflamed bones around the sinuses (osteitis)  These problems may need to be treated in a hospital with intravenous (IV) antibiotic medicine or surgery.  When to call the healthcare provider  Call your healthcare provider if you have any of the following:    Symptoms that don t get better, or get worse    Symptoms that don t get better after 3 to 5 days on antibiotics    Trouble seeing    Swelling around your eyes    Confusion or trouble staying awake   Date Last Reviewed: 5/1/2017 2000-2017 [employer]. [address] [place], [place] [address]. All rights reserved. This information is not intended as a substitute for professional medical care. Always follow your healthcare professional's instructions.      [facility]    If you have any questions regarding to your visit please contact your care team:       Team Purple:   Clinic Hours Telephone Number   Dr. Chandni Orona Dr.  [place]   7am-7pm  Monday - Thursday   7am-5pm  Fridays  [phone]  (Appointment scheduling available 24/7)    Questions about your Visit?   Team Line:  [phone]   Urgent Care - [place] and [facility] - 11am-9pm Monday-Friday Saturday-Sunday- 9am-5pm   [place] - 5pm-9pm Monday-Friday Saturday-Sunday- 9am-5pm  [phone] - [address]  [phone] - [place]       What options do I have for visits at the clinic other than the traditional office visit?  To expand how we care for you, many of our providers are utilizing electronic visits (e-visits) and telephone visits, when medically appropriate, for interactions with their patients rather than a visit in the clinic.   We also offer nurse visits for many medical concerns. Just like any other service, we will bill your insurance company for this type of visit based on time spent on the phone with your provider. Not all insurance companies cover these visits. Please check with your medical insurance if this type of visit is covered. You will be responsible for any charges that are not paid by your insurance.      E-visits via HDF:  generally incur a $35.00 fee.  Telephone visits:  Time spent on the phone: *charged based on time that is spent on the phone in increments of 10 minutes. Estimated cost:   5-10 mins $30.00   11-20 mins. $59.00   21-30 mins. $85.00     Use [employer]nexust (secure email communication and access to your chart) to send your primary care provider a message or make an appointment. Ask someone on your Team how to sign up for HDF.  For a Price Quote for your services, please call our Consumer Price Line at [phone].  As always, Thank you for trusting us with your health care needs!    Nelly Dias MA            Follow-ups after your visit        Follow-up notes from your care team     Return if symptoms worsen or fail to improve.      Your next 10 appointments already  scheduled     May 21, 2018  4:20 PM CDT   (Arrive by 4:05 PM)   RETURN ENDOCRINE with Avelina Castro MD   [facility] ([facility])    [address]  [address] [facility] [id number] [phone]            May 21, 2018  5:00 PM CDT   (Arrive by 4:45 PM)   CT CHEST ABDOMEN PELVIS W/O & W CONTRAST with UCCT2   [facility] ([facility])    [address] [place] [id number] [phone]           Please bring any scans or X-rays taken at other hospitals, if similar tests were done. Also bring a list of your medicines, including vitamins, minerals and over-the-counter drugs. It is safest to leave personal items at home.  Be sure to tell your doctor:   If you have any allergies.   If there s any chance you are pregnant.   If you are breastfeeding.  You may have contrast for this exam. To prepare:   Do not eat or drink for 2 hours before your exam. If you need to take medicine, you may take it with small sips of water. (We may ask you to take liquid medicine as well.)   The day before your exam, drink extra fluids at least six 8-ounce glasses (unless your doctor tells you to restrict your fluids).   You will be given instructions on how to drink the contrast.  Patients over 70 or patients with diabetes or kidney problems:   If you haven t had a blood test (creatinine test) within the last 30 days, the Cardiologist/Radiologist may require you to get this test prior to your exam.  If you have diabetes:   Continue to take your metformin medication on the day of your exam  Please wear loose clothing, such as a sweat suit or jogging clothes. Avoid snaps, zippers and other metal. We may ask you to undress and put on a hospital gown.  If you have any questions, please call the Imaging Department where you will have your exam.            May 29, 2018  3:30 PM CDT   (Arrive by 3:15 PM)   Return Visit with Shahla  Raul Crawley MD   [facility] ([facility])    [address]  [address]  [facility] [id number] [phone]            May 29, 2018  4:00 PM CDT   RETURN ONC with Garima Sauceda MD   [facility] ([facility])    [address]  [address]  [facility] [id number] [phone]            Jun 06, 2018  7:00 AM CDT   (Arrive by 6:45 AM)   Return Visit with Edu Benitez MD   [facility] ([facility])    [address]  [address]  [facility] [id number] [phone]              Who to contact     If you have questions or need follow up information about today's clinic visit or your schedule please contact [facility] directly at [phone].  Normal or non-critical lab and imaging results will be communicated to you by [employer]En[employer]hart, letter or phone within 4 business days after the clinic has received the results. If you do not hear from us within 7 days, please contact the clinic through Electric Cloudt or phone. If you have a critical or abnormal lab result, we will notify you by phone as soon as possible.  Submit refill requests through Vive Nano or call your pharmacy and they will forward the refill request to us. Please allow 3 business days for your refill to be completed.          Additional Information About Your Visit        [employer] Information     [employer] gives you secure access to your electronic health record. If you see a primary care provider, you can also send messages to your care team and make appointments. If you have questions, please call your primary care clinic.  If you do not have a primary care provider, please call [phone] and they will assist you.        Care EveryWhere ID     This is your Care EveryWhere ID. This could be used by other organizations to access your [place] medical records  [id number]         Your Vitals Were     Pulse Temperature Respirations Pulse Oximetry          103 98.7  F (37.1  C) (Oral) 14 96%         Blood Pressure from Last 3 Encounters:   04/19/18 102/72   02/28/18 118/61   02/23/18 129/75    Weight from Last 3 Encounters:   02/23/18 196 lb 8 oz (89.1 kg)   02/01/18 191 lb 8 oz (86.9 kg)   01/29/18 178 lb 14.4 oz (81.1 kg)              We Performed the Following     CBC with platelets and differential          Today's Medication Changes          These changes are accurate as of 4/19/18  5:20 PM.  If you have any questions, ask your nurse or doctor.               Start taking these medicines.        Dose/Directions    fluticasone 50 MCG/ACT spray   Commonly known as:  FLONASE   Used for:  Bacterial sinusitis   Started by:  Pablo Soto MD        Dose:  1-2 spray   Spray 1-2 sprays into both nostrils daily   Quantity:  1 Bottle   Refills:  11       levofloxacin 750 MG tablet   Commonly known as:  LEVAQUIN   Used for:  Bacterial sinusitis   Started by:  Pablo Soto MD        Dose:  750 mg   Take 1 tablet (750 mg) by mouth daily   Quantity:  10 tablet   Refills:  0         These medicines have changed or have updated prescriptions.        Dose/Directions    calcitRIOL 0.25 MCG capsule   Commonly known as:  ROCALTROL   This may have changed:    - how much to take  - how to take this  - when to take this  - additional instructions   Used for:  Postsurgical hypothyroidism, Papillary carcinoma, follicular variant (H), Metastasis to cervical lymph node (H)        TAKE 2 CAPSULES BY MOUTH EVERY MORNING AND TAKE 1 CAPSULE BY MOUTH EVERY NIGHT AT BEDTIME   Quantity:  90 capsule   Refills:  0       cyclobenzaprine 10 MG tablet   Commonly known as:  FLEXERIL   This may have changed:  when to take this   Used for:  High grade B-cell lymphoma (H)        Dose:  10 mg   Take 1 tablet (10 mg) by mouth 3 times daily as needed   Quantity:  30 tablet   Refills:  0        levothyroxine 112 MCG tablet   Commonly known as:  SYNTHROID/LEVOTHROID   This may have changed:    - how much to take  - additional instructions   Used for:  Postsurgical hypothyroidism, Papillary carcinoma, follicular variant (H), Postsurgical hypoparathyroidism (H), Thyroid cancer (H)        Mon-Sat: (2 tabs daily) Sunday: 3 tabs   Quantity:  189 tablet   Refills:  3       ondansetron 8 MG ODT tab   Commonly known as:  ZOFRAN-ODT   This may have changed:  when to take this   Used for:  High grade B-cell lymphoma (H), Nausea and vomiting, intractability of vomiting not specified, unspecified vomiting type        Dose:  8 mg   Take 1 tablet (8 mg) by mouth every 8 hours as needed   Quantity:  30 tablet   Refills:  1            Where to get your medicines      These medications were sent to [id number] - [place], [place] - [address]  [address], [place] [address]     Phone:  [phone]     fluticasone 50 MCG/ACT spray    levofloxacin 750 MG tablet                Primary Care Provider Office Phone # Fax #    Shahla Crawley [phone]       [address] [place] [address]  [facility] [address]        Equal Access to Services     RODRIGO ZAOMRA : Hadtimothy marley hadasho Soomaali, waaxda luqadaha, qaybta kaalmada adeegyada, ranjan martin. So [facility] [phone].    ATENCIÓN: Si habla español, tiene a stiles disposición servicios gratuitos de asistencia lingüística. Mataame al [phone].    We comply with applicable federal civil rights laws and [place] laws. We do not discriminate on the basis of race, color, national origin, age, disability, sex, sexual orientation, or gender identity.            Thank you!     Thank you for choosing [facility]  for your care. Our goal is always to provide you with excellent care. Hearing back from our patients is one way we can continue to improve our services. Please take a few minutes to complete the  written survey that you may receive in the mail after your visit with us. Thank you!             Your Updated Medication List - Protect others around you: Learn how to safely use, store and throw away your medicines at www.disposemymeds.org.          This list is accurate as of 4/19/18  5:20 PM.  Always use your most recent med list.                   Brand Name Dispense Instructions for use Diagnosis    ACAI RAMIREZ PO      Take 50 mg by mouth        acetaminophen 325 MG tablet    TYLENOL    100 tablet    Take 2 tablets (650 mg) by mouth every 4 hours as needed for mild pain or fever    High grade B-cell lymphoma (H)       acyclovir 400 MG tablet    ZOVIRAX    30 tablet    Take 1 tablet (400 mg) by mouth 2 times daily    High grade B-cell lymphoma (H)       aluminum chloride 20 % external solution    DRYSOL    60 mL    Apply topically At Bedtime    Rash, Intertrigo       AZMACORT IN      Inhale 2 puffs into the lungs as needed        bisacodyl 5 MG EC tablet     30 tablet    Take 3 tablets (15 mg) by mouth daily as needed for constipation    Constipation, unspecified constipation type       calcitRIOL 0.25 MCG capsule    ROCALTROL    90 capsule    TAKE 2 CAPSULES BY MOUTH EVERY MORNING AND TAKE 1 CAPSULE BY MOUTH EVERY NIGHT AT BEDTIME    Postsurgical hypothyroidism, Papillary carcinoma, follicular variant (H), Metastasis to cervical lymph node (H)       calcium citrate-vitamin D 315-250 MG-UNIT Tabs per tablet    CALCIUM CITRATE +D    20 tablet    Take 2 tablets by mouth twice a week    High grade B-cell lymphoma (H), Hypocalcemia       celecoxib 200 MG capsule    celeBREX    56 capsule    Take 1 capsule (200 mg) by mouth 2 times daily    Chest wall pain       cetirizine 10 MG tablet    ZYRTEC ALLERGY    90 tablet    Take 1 tablet (10 mg) by mouth 3 times daily On hold for lab test.    High grade B-cell lymphoma (H)       COMPOUND CONTAINING CONTROLLED SUBSTANCE - PHARMACY TO MIX COMPOUNDED MEDICATION    CMPD RX     120 g    Apply small amount to affected area two times daily.    Neoplasm related pain       cromolyn 4 % ophthalmic solution    OPTICROM    10 mL    Place 1 drop into both eyes 4 times daily    Idiopathic mast cell activation syndrome (H)       cromolyn sodium 5.2 MG/ACT Aers Inhaler    NASALCROM    1 Bottle    SPRAY ONE SPRAY( 1 ML) IN NOSTRIL DAILY    Mast cell disease, systemic       [employer] GUMMY BE[employer] CHILDREN Chew     120 tablet    Take 5 g by mouth daily (2 gummy= 5 g =1 serving)    High grade B-cell lymphoma (H)       cyclobenzaprine 10 MG tablet    FLEXERIL    30 tablet    Take 1 tablet (10 mg) by mouth 3 times daily as needed    High grade B-cell lymphoma (H)       diazepam 5 MG tablet    VALIUM    90 tablet    Take 1 tablet (5 mg) by mouth 3 times daily as needed for muscle spasms    Chest wall pain       diclofenac 1 % Gel topical gel    VOLTAREN    100 g    Apply affected area two times daily PRN using enclosed dosing card.    Myofascial pain       EPINEPHrine 0.3 MG/0.3ML injection 2-pack    EPIPEN 2-CARIDAD    0.6 mL    Inject 0.3 mLs (0.3 mg) into the muscle once as needed for anaphylaxis        fluticasone 50 MCG/ACT spray    FLONASE    1 Bottle    Spray 1-2 sprays into both nostrils daily    Bacterial sinusitis       * furosemide 20 MG tablet    LASIX    60 tablet    Take 1 tablet (20 mg) by mouth 2 times daily    Localized edema       * furosemide 20 MG tablet    LASIX    60 tablet    Take 1 tablet (20 mg) by mouth 2 times daily    Bilateral lower extremity edema       hydrochlorothiazide 12.5 MG capsule    MICROZIDE    30 capsule    Take 1 capsule (12.5 mg) by mouth daily    Hypocalcemia       levofloxacin 750 MG tablet    LEVAQUIN    10 tablet    Take 1 tablet (750 mg) by mouth daily    Bacterial sinusitis       levothyroxine 112 MCG tablet    SYNTHROID/LEVOTHROID    189 tablet    Mon-Sat: (2 tabs daily) Sunday: 3 tabs    Postsurgical hypothyroidism, Papillary carcinoma, follicular variant (H),  Postsurgical hypoparathyroidism (H), Thyroid cancer (H)       lidocaine 5 % ointment    XYLOCAINE    350 g    Apply quarter size amount to chest and back up to 3 times daily as needed for pain.    Chest wall pain       lidocaine visc 2% 2.5mL/5mL & maalox/mylanta w/ simeth 2.5mL/5mL & diphenhydrAMINE 5mg/5mL Susp suspension    [facility]    360 mL    Swish and spit 10 mLs in mouth every 6 hours as needed for mouth sores    Stomatitis and mucositis, High grade B-cell lymphoma (H)       * lidocaine-prilocaine cream    EMLA    30 g    Apply topically as needed (port access pain.)    Neoplasm related pain, Chest wall pain       * lidocaine-prilocaine cream    EMLA    60 g    Apply topically as needed for moderate pain    High grade B-cell lymphoma (H)       methocarbamol 750 MG tablet    ROBAXIN     Take 1 tablet (750 mg) by mouth 4 times daily . Ok to take a 5th Robaxin on very severe days.    Myofascial pain       montelukast 10 MG tablet    SINGULAIR    60 tablet    Take 2 tablets (20 mg) by mouth 2 times daily    Idiopathic mast cell activation syndrome (H)       morphine 30 MG 12 hr tablet   Start taking on:  4/21/2018    MS CONTIN    120 tablet    Take 1 tablet (30 mg) by mouth every 8 hours . Take an addition pill at night such that your evening dose is 60mg    Neoplasm related pain       ondansetron 8 MG ODT tab    ZOFRAN-ODT    30 tablet    Take 1 tablet (8 mg) by mouth every 8 hours as needed    High grade B-cell lymphoma (H), Nausea and vomiting, intractability of vomiting not specified, unspecified vomiting type       * order for DME     1 Units    Compression stockings, medium grade, please measure and fit. Please dispense a pair.    Peripheral edema       * order for DME     2 Units    Equipment being ordered: compression stockings. 20-30 mm or 30 - 40 mm as patient can tolerate. Physical therapy to determine if they should be above or below the knee.    Venous stasis       * order for DME      2 Device    Equipment being ordered: compression bra    Malignant neoplasm of right female breast (H)       order for DME     1 Units    Compression socks - knee high 20-30 mmHg zippered if possible    Lower extremity edema       oxyCODONE IR 30 MG tablet   Start taking on:  4/21/2018    ROXICODONE    180 tablet    Take 1 tablet (30 mg) by mouth every 4 hours as needed for moderate to severe pain    High grade B-cell lymphoma (H)       polyethylene glycol powder    MIRALAX    510 g    Take 17 g (1 capful) by mouth daily    Acute constipation       potassium chloride SA 20 MEQ CR tablet    KLOR-CON    60 tablet    Take 1 tablet (20 mEq) by mouth 2 times daily    Hypokalemia       PROBIOTIC DAILY PO      Take 1 capsule by mouth daily Lacto acid bifidobacterium    Breast cancer, unspecified laterality, Thyroid cancer (H), Chronic arthralgias of knees and hips, unspecified laterality       prochlorperazine 10 MG tablet    COMPAZINE     Take 10 mg by mouth as needed        ranitidine 75 MG tablet    ZANTAC    90 tablet    Take 1 tablet (75 mg) by mouth 3 times daily    Mast cell disease, systemic       senna-docusate 8.6-50 MG per tablet    SENOKOT-S;PERICOLACE    60 tablet    Take 1-2 tablets by mouth 2 times daily as needed for constipation    Acute constipation       SUMAtriptan 50 MG tablet    IMITREX     Take 50 mg by mouth as needed        triamcinolone 0.025 % ointment    KENALOG     Apply topically as needed        UNABLE TO FIND      3 tablets 3 times daily MEDICATION NAME: calcium D-Glucarate  3 caps contain 180mg of elemental calcium.        UNABLE TO FIND      Take 2 capsules by mouth 3 times daily Muscle Mag. 2 caps contain B1 20mg, B2 20mg, B6 10mg, magesium 20mg, manganese 2mg.        UNABLE TO FIND      2 tablets 3 times daily MEDICATION NAME: Digestzymes    Thyroid cancer (H), Postsurgical hypothyroidism, Postsurgical hypoparathyroidism (H)       UNABLE TO FIND      1 tablet daily MEDICATION NAME: Pure  Encapsulations    Thyroid cancer (H), Postsurgical hypothyroidism, Postsurgical hypoparathyroidism (H)       VENTOLIN  (90 Base) MCG/ACT Inhaler   Generic drug:  albuterol     18 g    INHALE 2 PUFFS INTO THE LUNGS EVERY 4 HOURS AS NEEDED FOR SHORTNESS OF BREATH OR DIFFICULT BREATHING OR WHEEZING    Mild intermittent asthma without complication       vitamin D3 2000 units Caps     60 capsule    Take 5,000 Units by mouth daily Takes 2 tabs daily    Thyroid cancer (H), Postsurgical hypothyroidism, Papillary carcinoma, follicular variant (H), Postsurgical hypoparathyroidism (H)       * Notice:  This list has 7 medication(s) that are the same as other medications prescribed for you. Read the directions carefully, and ask your doctor or other care provider to review them with you.

## 2018-04-24 DIAGNOSIS — D05.10 DUCTAL CARCINOMA IN SITU (DCIS) OF BREAST, UNSPECIFIED LATERALITY: Primary | ICD-10-CM

## 2018-04-24 RX ORDER — TAMOXIFEN CITRATE 20 MG/1
20 TABLET ORAL DAILY
Qty: 90 TABLET | Refills: 3 | Status: SHIPPED | OUTPATIENT
Start: 2018-04-24 | End: 2018-05-29

## 2018-04-24 RX ORDER — TAMOXIFEN CITRATE 20 MG/1
20 TABLET ORAL DAILY
COMMUNITY
Start: 2017-05-13 | End: 2018-04-24

## 2018-04-27 ENCOUNTER — TELEPHONE (OUTPATIENT)
Dept: PALLIATIVE CARE | Facility: CLINIC | Age: 58
End: 2018-04-27

## 2018-04-27 NOTE — TELEPHONE ENCOUNTER
Received VM from patient - she states in her VM that she is just calling to let us know that she is still having a lot of pain and it is everywhere. Has been there for several months. She states she wants Dr. Benitez to be informed and that she is going to follow up with her oncologist.

## 2018-04-28 DIAGNOSIS — C85.10 HIGH GRADE B-CELL LYMPHOMA (H): ICD-10-CM

## 2018-04-28 DIAGNOSIS — R11.2 NAUSEA AND VOMITING, INTRACTABILITY OF VOMITING NOT SPECIFIED, UNSPECIFIED VOMITING TYPE: ICD-10-CM

## 2018-05-01 RX ORDER — ONDANSETRON 8 MG/1
TABLET, ORALLY DISINTEGRATING ORAL
Qty: 30 TABLET | Refills: 0 | OUTPATIENT
Start: 2018-05-01

## 2018-05-02 ENCOUNTER — DOCUMENTATION ONLY (OUTPATIENT)
Dept: PHARMACY | Facility: CLINIC | Age: 58
End: 2018-05-02

## 2018-05-02 NOTE — PROGRESS NOTES
Prior Authorization Approval    ondansetron 8 mg ODT  Date Initiated: 05/02/2018  Date Completed: 05/02/2018  Prior Auth Type: Quantity Limit     Status: Approved    Effective Date: 04/02/2018 - 10/29/2018  Copay: 10.00  Sebec Filled: Yes    Insurance: [employer]  Ph: [phone]  ID: [id number]  Case Number: [id number]  Submitted Via: Eliseo Jha  Pharmacy Liaison  Ph: [phone] Page: [phone]

## 2018-05-03 ENCOUNTER — TELEPHONE (OUTPATIENT)
Dept: FAMILY MEDICINE | Facility: CLINIC | Age: 58
End: 2018-05-03

## 2018-05-03 ENCOUNTER — MYC MEDICAL ADVICE (OUTPATIENT)
Dept: ONCOLOGY | Facility: CLINIC | Age: 58
End: 2018-05-03

## 2018-05-03 DIAGNOSIS — K12.1 STOMATITIS AND MUCOSITIS: ICD-10-CM

## 2018-05-03 DIAGNOSIS — K12.30 STOMATITIS AND MUCOSITIS: ICD-10-CM

## 2018-05-03 DIAGNOSIS — C85.10 HIGH GRADE B-CELL LYMPHOMA (H): ICD-10-CM

## 2018-05-05 DIAGNOSIS — E89.0 POSTSURGICAL HYPOTHYROIDISM: ICD-10-CM

## 2018-05-05 DIAGNOSIS — E89.2 POSTSURGICAL HYPOPARATHYROIDISM (H): ICD-10-CM

## 2018-05-05 DIAGNOSIS — C73 PAPILLARY CARCINOMA, FOLLICULAR VARIANT (H): Primary | ICD-10-CM

## 2018-05-07 DIAGNOSIS — C50.919 BREAST CANCER (H): Primary | ICD-10-CM

## 2018-05-07 DIAGNOSIS — R11.2 NAUSEA AND VOMITING, INTRACTABILITY OF VOMITING NOT SPECIFIED, UNSPECIFIED VOMITING TYPE: ICD-10-CM

## 2018-05-07 DIAGNOSIS — C85.10 HIGH GRADE B-CELL LYMPHOMA (H): ICD-10-CM

## 2018-05-08 RX ORDER — ONDANSETRON 8 MG/1
TABLET, ORALLY DISINTEGRATING ORAL
Qty: 30 TABLET | Refills: 0 | Status: SHIPPED | OUTPATIENT
Start: 2018-05-08 | End: 2018-06-14

## 2018-05-11 RX ORDER — DIPHENHYDRAMINE HYDROCHLORIDE AND LIDOCAINE HYDROCHLORIDE AND ALUMINUM HYDROXIDE AND MAGNESIUM HYDRO
5-10 KIT EVERY 6 HOURS PRN
Qty: 237 ML | Refills: 1 | Status: SHIPPED | OUTPATIENT
Start: 2018-05-11 | End: 2021-08-11

## 2018-05-14 DIAGNOSIS — R60.0 BILATERAL LOWER EXTREMITY EDEMA: ICD-10-CM

## 2018-05-16 DIAGNOSIS — G89.3 NEOPLASM RELATED PAIN: ICD-10-CM

## 2018-05-16 DIAGNOSIS — C85.10 HIGH GRADE B-CELL LYMPHOMA (H): ICD-10-CM

## 2018-05-16 RX ORDER — OXYCODONE HYDROCHLORIDE 30 MG/1
30 TABLET ORAL EVERY 4 HOURS PRN
Qty: 180 TABLET | Refills: 0 | Status: SHIPPED | OUTPATIENT
Start: 2018-05-19 | End: 2018-06-08

## 2018-05-16 RX ORDER — FUROSEMIDE 20 MG
TABLET ORAL
Qty: 60 TABLET | Refills: 3 | Status: SHIPPED | OUTPATIENT
Start: 2018-05-16 | End: 2018-09-02

## 2018-05-16 RX ORDER — MORPHINE SULFATE 30 MG/1
30 TABLET, FILM COATED, EXTENDED RELEASE ORAL EVERY 8 HOURS
Qty: 120 TABLET | Refills: 0 | Status: SHIPPED | OUTPATIENT
Start: 2018-05-19 | End: 2018-06-08

## 2018-05-21 ENCOUNTER — OFFICE VISIT (OUTPATIENT)
Dept: ENDOCRINOLOGY | Facility: CLINIC | Age: 58
End: 2018-05-21
Payer: COMMERCIAL

## 2018-05-21 ENCOUNTER — RADIANT APPOINTMENT (OUTPATIENT)
Dept: CT IMAGING | Facility: CLINIC | Age: 58
End: 2018-05-21
Payer: COMMERCIAL

## 2018-05-21 VITALS — SYSTOLIC BLOOD PRESSURE: 144 MMHG | DIASTOLIC BLOOD PRESSURE: 85 MMHG | HEART RATE: 96 BPM

## 2018-05-21 DIAGNOSIS — E89.0 POSTSURGICAL HYPOTHYROIDISM: ICD-10-CM

## 2018-05-21 DIAGNOSIS — E83.51 HYPOCALCEMIA: Primary | ICD-10-CM

## 2018-05-21 DIAGNOSIS — Z98.84 STATUS POST BARIATRIC SURGERY: ICD-10-CM

## 2018-05-21 DIAGNOSIS — C83.398 DIFFUSE LARGE B-CELL LYMPHOMA OF EXTRANODAL SITE: ICD-10-CM

## 2018-05-21 DIAGNOSIS — C73 PAPILLARY CARCINOMA, FOLLICULAR VARIANT (H): ICD-10-CM

## 2018-05-21 DIAGNOSIS — C77.0 METASTASIS TO CERVICAL LYMPH NODE (H): ICD-10-CM

## 2018-05-21 PROCEDURE — 96523 IRRIG DRUG DELIVERY DEVICE: CPT

## 2018-05-21 PROCEDURE — 25000128 H RX IP 250 OP 636: Performed by: INTERNAL MEDICINE

## 2018-05-21 RX ORDER — HEPARIN SODIUM (PORCINE) LOCK FLUSH IV SOLN 100 UNIT/ML 100 UNIT/ML
500 SOLUTION INTRAVENOUS ONCE
Status: COMPLETED | OUTPATIENT
Start: 2018-05-21 | End: 2018-05-21

## 2018-05-21 RX ORDER — CALCITRIOL 0.25 UG/1
CAPSULE, LIQUID FILLED ORAL
Qty: 90 CAPSULE | Refills: 11 | Status: SHIPPED | OUTPATIENT
Start: 2018-05-21 | End: 2019-05-26

## 2018-05-21 RX ORDER — IOPAMIDOL 755 MG/ML
120 INJECTION, SOLUTION INTRAVASCULAR ONCE
Status: COMPLETED | OUTPATIENT
Start: 2018-05-21 | End: 2018-05-21

## 2018-05-21 RX ADMIN — IOPAMIDOL 120 ML: 755 INJECTION, SOLUTION INTRAVASCULAR at 17:26

## 2018-05-21 RX ADMIN — Medication 500 UNITS: at 17:41

## 2018-05-21 ASSESSMENT — PAIN SCALES - GENERAL: PAINLEVEL: NO PAIN (0)

## 2018-05-21 NOTE — DISCHARGE INSTRUCTIONS

## 2018-05-21 NOTE — PROGRESS NOTES
Endocrinology Consult Note    Attending ASSESSMENT/PLAN:     1.  Papillary thyroid carcinoma, follicular variant, + ETE (minimal), bilateral, MF, node +.  MACIS is < 6 but TNM is stage III, ANSELMO recurrence risk Intermediate.  She has been treated with total Tx and CND and RRA. She now s/p ETOH treatment to 2  left neck LNs (left level 6 and  left level 3).  Left level 6 LN is gone.      Persistent thyroglobulinemia indicates we still have thyroid tissue. Last Tg from 11/17 was higher.  Repeat Tg    2.  Cervical adenopathy   A) biopsy proven metastatic thyroid cancer  Left level 3  Cervical LN  (0.5 cm, round) positive for papillary thyroid cancer cytology. This was ETOH treated 8/19/14  And 10/24/14.  I believe the treatment has resolved  this node.    Left level 6 has very high Tg consistent with metastatic thyroid cancer. This was treated with ETOh on 8/19/14 and 10/24/14, and I think also on 12/30 and 12/31/14.  (it had benign cytology on repeat FNAB  12/30/14).  This is gone on 11/15 US , 5/16 and 11/16 US  Left level 3 medial to left level 6 - benign on FNAB; I do not think this was treated. This persists and appear benign     3.  Post surgical hypothyroidism. Treat to TSH < 0.4 because of # 1.  She is on  LT4  224*7.5/week, divided (mean 240 mcg/day) with TSH at target.     4.   Hypocalcemia with normal PTH. She has low albumin.   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   She is having a hard time getting the calcium in, possibly having some low calcium symtpoms.     History of gregory en Y gastric bypass is potentially going to affect absorption of the calcium/ vitamin D/ L-T4. I believe this is an important history relative to the hypoparathyoridsim    Avelina Castro MD        Cc/  HISTORY OF PRESENT ILLNESS Tisha presents today for  follow up of thyroid cancer and post surgical hypothyroidism. She also has a history of breast caner and lymphoma.  She was lst seen by me 11/17.  At that time she was on   LT4  224 x 7.5/week  , divided 240 mcg/day over the course of a week.  At the last appt there was a ? Of rise in Tg . I suggested she repeat the labs in a few months . It appears my already placed orders weren't used, and so the wrong tests were done in 2/18.       Thyroid cancer was discovered in 2013 in the course of getting PET followup for new diagnosis of breast cancer.    Her course can be summarized as follows:  7/10/13  total Thyroidectomy and CND  removing bilateral, multifocal papillary thyroid carcinoma, left 1.3 cm (Follicular variant) with minimal extrathyoridal extension and right 0.25 cm (microcarcinoma variant).  2/3 left level 6 LNs were + for PCT (MACIS 5.55, TNM stage III).    9/5/13: Thyrogen stimulated 105 mCi 131I .  Post treatment TBS showed intense neck uptake.      6/11/14 FNAB of left  level 3 node with ? microcalcifications cytology positive for malignancy - papillary Ca, needle wash Tg 6.4 ng/ml.    left level 6 LN  Cytology  benign butneedle wash Tg 1213 ng/ml.   8/18/14 and on 10/24/14 ETOH injection into these  2 lymph nodes.      12/30/14  repeat FNAB of medial to level 6 left level 3 and left level 6 LN .  FNAB of the left level 3 LN and left level 6 LN on 12/30 was read as benign.  Needle wash Tg was < 0.3 (left level 3) and 0.6 (left level 6).    12/30 and 12/31/14  ETOH ablation of left level 6 LN (per the images), though the report states this is left level 3 LN that was ablated.     We have the following tumor marker data  5/2/13: Tg 45, ANSELMO < 0.4, TSH   SURGERY  9/5/13: Tg 12.6 ng/ml, ANSEMLO < 0.4 ,   I131 105 mCi  10/22/13: Tg 3.7, Anselmo < 0.4 U/ml, TSH 0.27  3/7/14: Tg 5.4, ANSELMO < 0.4, TSH   6/3/14: Tg 1.3, ANSELMO < 0.4, TSH  Not done  7/18/14 Tg 13.6, ANSELMO < 0.4, TSH 16.9 Thyrogen stimulated  123I TBS  negative. We did not see uptake in the cervical LNs we know are biopsy proven PCT (as is often the case).   ETOH treatments 8/14 and 10/14  11/25/14 Tg 3.5, BERYL < 0.4, TSH 4.95  12/30/14 ETOH treatment  2/17/15: Tg 1.4, BERYL < 0.4, TSH 0.41  5/18/15: Tg 1.3, BERYL < 0.4, TSH 0.42--  12/7/15: Tg 1.3, BERYL < 0.4, TSH 0.12  5/2/16 Tg 1.1 ng/ml , BERYL  < 0.4 U/ml.   11/2/16: Tg 1.4, BERYL < 0.4, TSH 0.45.  -   4/26/17: Tg 1.1, BERYL < 0.4, TSH 0.5  11/2/17  Tg 1.3 , BERYL < 0.4 - - concurrent remeasure 4/16 Tg 0.84.  TSH 0.22  2/6/18: Ca 8.4, albumin 3, total protein 5.8  2/23/18: Ca 8.4, creatinine 0.78  5/21/18: Tg 0.43, Beryl < 0.4, TSH < 0.01, free T4 1.64- results followed appt and were not discussed at appt.     Neck US 11/2/17 (compared with 4/26/17 ):    Right level 4 # 1 not seen  (was 0.7 x 0.5 x 0.6 cm  Left level 3 # 1 (medial to IJ): not seen on stills - I think I see it on cine -  (was  0.6 x 0.5 x 1.2  (was 0.7  0.5 x 1.3 ( was  0.6 x 0.5 x 1.4 cm prominent hilum   2/27/18 PET /CT: negative per my review of images today; read as minimal uptake right 10th rib bearing paravertebral soft tissue lesion-     ROS        Weight loss  Hard time eating  Cardiac: negative  Respiratory: HEIN on long distance or climbing stairs;   GI: constipation  Hands will cramp and twist; this last occurred last night  Leg cramps have been relieved by compression stockings. 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 No real need for Epipen for this alone.     GDM (gestational diabetes mellitus)     w first pregnancy only     Gestational  hypertension      Lymphedema     jackson arms, chest     Migraine      Neuropathy associated with cancer (H)      Papillary carcinoma, follicular variant (H) 2013    pT3, N1, Mx, thyroid     Post-surgical hypothyroidism      Renal disease     kidney stones     Rhinitis, allergic to other allergen      Right Breast mass and microcalcifications 2011     Seasonal allergic conjunctivitis      Seasonal allergic rhinitis 11 skin tests pos. for: dog/M/T/G/W--NEGATIVE FOOD TESTS FOR: shrimp, crab, lobster, coconut     Past Surgical History:   Procedure Laterality Date     BACK SURGERY  ,    Bulging disc w nerve root impigment     BIOPSY BREAST      right     BIOPSY BREAST  11    right, core and sterotactic     BONE MARROW BIOPSY, BONE SPECIMEN, NEEDLE/TROCAR Left 10/3/2017    Procedure: BIOPSY BONE MARROW;  Bone Marrow Biopsy with aspirate;  Surgeon: Amelia Watkins PA-C;  Location: [facility]     BYPASS GASTRIC, CHOLECYSTECTOMY, COMBINED       C LAPAROSCOPIC GASTRIC RESTRICTIVE PX, W/GASTRIC BYPASS/ GAMALIEL-EN-Y, < 150CM      Gamaliel en Y?      SECTION       COLONOSCOPY      normal     COSMETIC SURGERY  2012    Final Stage of Mastectomy     DAVINCI HYSTERECTOMY TOTAL, BILATERAL SALPINGO-OOPHORECTOMY, COMBINED  2012    Procedure: COMBINED DAVINCI HYSTERECTOMY TOTAL, SALPINGO-OOPHORECTOMY;  Davinci Total Laparoscopic Hysterectomy, Bilateral Salpingo Oophorectomy, Pelvic Washings, Cystoscopy;  Surgeon: Evie Sheikh MD;  Location: [facility] OR     ENT SURGERY       ESOPHAGOSCOPY, GASTROSCOPY, DUODENOSCOPY (EGD), COMBINED N/A 2017    Procedure: COMBINED ENDOSCOPIC ULTRASOUND, ESOPHAGOSCOPY, GASTROSCOPY, DUODENOSCOPY (EGD), FINE NEEDLE ASPIRATE/BIOPSY;  Esophagogastroduodenoscopy, Endoscopic Ultrasound, with fine needle biopsy aspirate;  Surgeon: Patrick Robles MD;  Location: [facility] OR     GI SURGERY  2007    Gamaliel-en Y w cholecystectomy     GYN SURGERY   1/6/89,1/10/92    2 c-sections     HYSTERECTOMY, PAP NO LONGER INDICATED  12/12    DeVinci assister lap hyst with BSO     INSERT PORT VASCULAR ACCESS Right 10/4/2017    Procedure: INSERT PORT VASCULAR ACCESS;  Port Placement;  Surgeon: Jc Goodman PA-C;  Location: [facility]     IRRIGATION AND DEBRIDEMENT BREAST  3/1/2012    Procedure:IRRIGATION AND DEBRIDEMENT BREAST; Irrigation and Debridement, Wound Closure Right Breast; Surgeon:JUNG GUILLEN; Location:[facility] OR     MASTECTOMY, RECONSTRUCT BREAST, COMBINED  1/30/2012    Procedure:COMBINED MASTECTOMY, RECONSTRUCT BREAST; Bilateral Mastectomies, Right Axillary [place] Node Biopsy, Bilateral Breast Reconstruction with Tissue Expanders, Reconstruction of inframammary fold, bilateral pain management systems; Surgeon:ANUPAM CAMPBELL; Location:[facility] OR     RECONSTRUCT BREAST  8/31/2012    Procedure: RECONSTRUCT BREAST;  Bilateral 2nd stage breast reconstruction, revision,       SOFT TISSUE SURGERY  1-30-12    Mastectomy w severe myofascial pain syndrome     THYROIDECTOMY  7/10/2013    Procedure: THYROIDECTOMY;  Total Thyroidectomy with central neck dissection;  Surgeon: Indiana Sneed MD;  Location: [facility] OR     TONSILLECTOMY      childhood       Medications  Current Outpatient Prescriptions   Medication Sig Dispense Refill     ACAI RAMIREZ PO Take 50 mg by mouth       acetaminophen (TYLENOL) 325 MG tablet Take 2 tablets (650 mg) by mouth every 4 hours as needed for mild pain or fever 100 tablet      acyclovir (ZOVIRAX) 400 MG tablet Take 1 tablet (400 mg) by mouth 2 times daily 30 tablet 0     aluminum chloride (DRYSOL) 20 % external solution Apply topically At Bedtime 60 mL 3     bisacodyl (DULCOLAX) 5 MG EC tablet Take 3 tablets (15 mg) by mouth daily as needed for constipation 30 tablet 0     calcitRIOL (ROCALTROL) 0.25 MCG capsule TAKE 2 CAPSULES BY MOUTH EVERY MORNING AND TAKE 1 CAPSULE BY MOUTH EVERY NIGHT AT BEDTIME 90 capsule 11     calcium  Citrate-vitamin D 500-400 MG-UNIT CHEW Take 1 tablet by mouth 3 times daily 60 tablet      cetirizine (ZYRTEC ALLERGY) 10 MG tablet Take 1 tablet (10 mg) by mouth 3 times daily On hold for lab test. 90 tablet 0     Cholecalciferol (VITAMIN D3) 2000 UNITS CAPS Take 5,000 Units by mouth daily Takes 2 tabs daily 60 capsule 0     COMPOUND CONTAINING CONTROLLED SUBSTANCE (CMPD RX) - PHARMACY TO MIX COMPOUNDED MEDICATION Apply small amount to affected area two times daily. 120 g 6     cromolyn (OPTICROM) 4 % ophthalmic solution Place 1 drop into both eyes 4 times daily 10 mL 0     cromolyn sodium (NASALCROM) 5.2 MG/ACT AERS Inhaler SPRAY ONE SPRAY( 1 ML) IN NOSTRIL DAILY 1 Bottle 6     [employer] FIBER GUMMY BEARS CHILDREN CHEW Take 5 g by mouth daily (2 gummy= 5 g =1 serving) 120 tablet 3     cyclobenzaprine (FLEXERIL) 10 MG tablet Take 1 tablet (10 mg) by mouth 3 times daily as needed (Patient taking differently: Take 10 mg by mouth 3 times daily ) 30 tablet 0     EPINEPHrine (EPIPEN 2-CARIDAD) 0.3 MG/0.3ML injection Inject 0.3 mLs (0.3 mg) into the muscle once as needed for anaphylaxis 0.6 mL 3     fluticasone (FLONASE) 50 MCG/ACT spray Spray 1-2 sprays into both nostrils daily 1 Bottle 11     furosemide (LASIX) 20 MG tablet Take 1 tablet (20 mg) by mouth 2 times daily 60 tablet 3     furosemide (LASIX) 20 MG tablet Take 1 tablet (20 mg) by mouth 2 times daily 60 tablet 0     hydrochlorothiazide (MICROZIDE) 12.5 MG capsule Take 1 capsule (12.5 mg) by mouth daily 30 capsule 0     levofloxacin (LEVAQUIN) 750 MG tablet Take 1 tablet (750 mg) by mouth daily 10 tablet 0     levothyroxine (SYNTHROID/LEVOTHROID) 112 MCG tablet Mon-Sat: (2 tabs daily) Sunday: 3 tabs (Patient taking differently: 112 mcg Mon-Sat: (2 tabs daily) Sunday: 3 tabs) 189 tablet 3     lidocaine-prilocaine (EMLA) cream Apply topically as needed for moderate pain 60 g 1     lidocaine-prilocaine (EMLA) cream Apply topically as needed (port access pain.) 30 g 1      magic mouthwash (FIRST-MOUTHWASH BLM) suspension Swish and spit 5-10 mLs in mouth every 6 hours as needed for mouth sores 237 mL 1     magic mouthwash suspension (diphenhydrAMINE, lidocaine, aluminum-magnesium & simethicone) Swish and spit 10 mLs in mouth every 6 hours as needed for mouth sores 360 mL 1     montelukast (SINGULAIR) 10 MG tablet Take 2 tablets (20 mg) by mouth 2 times daily 60 tablet 0     morphine (MS CONTIN) 30 MG 12 hr tablet Take 1 tablet (30 mg) by mouth every 8 hours . Take an addition pill at night such that your evening dose is 60mg 120 tablet 0     ondansetron (ZOFRAN-ODT) 8 MG ODT tab Take 1 tablet (8 mg) by mouth every 8 hours as needed 30 tablet 0     ondansetron (ZOFRAN-ODT) 8 MG ODT tab Take 1 tablet (8 mg) by mouth every 8 hours as needed (Patient taking differently: Take 8 mg by mouth every 8 hours ) 30 tablet 1     order for DME Compression socks - knee high 20-30 mmHg zippered if possible 1 Units 11     order for DME Equipment being ordered: compression stockings. 20-30 mm or 30 - 40 mm as patient can tolerate. Physical therapy to determine if they should be above or below the knee. 2 Units 4     order for DME Equipment being ordered: compression bra 2 Device 1     order for DME Compression stockings, medium grade, please measure and fit. Please dispense a pair. 1 Units 0     oxyCODONE IR (ROXICODONE) 30 MG tablet Take 1 tablet (30 mg) by mouth every 4 hours as needed for moderate to severe pain 180 tablet 0     polyethylene glycol (MIRALAX) powder Take 17 g (1 capful) by mouth daily 510 g 0     potassium chloride SA (KLOR-CON) 20 MEQ CR tablet Take 1 tablet (20 mEq) by mouth 2 times daily 60 tablet 3     Probiotic Product (PROBIOTIC DAILY PO) Take 1 capsule by mouth daily Lacto acid bifidobacterium       prochlorperazine (COMPAZINE) 10 MG tablet Take 10 mg by mouth as needed  3     ranitidine (ZANTAC) 75 MG tablet Take 1 tablet (75 mg) by mouth 3 times daily 90 tablet 0      senna-docusate (SENOKOT-S;PERICOLACE) 8.6-50 MG per tablet Take 1-2 tablets by mouth 2 times daily as needed for constipation 60 tablet 0     SUMAtriptan (IMITREX) 50 MG tablet Take 50 mg by mouth as needed  3     tamoxifen (NOLVADEX) 20 MG tablet Take 1 tablet (20 mg) by mouth daily 90 tablet 3     triamcinolone (KENALOG) 0.025 % ointment Apply topically as needed  3     Triamcinolone Acetonide (AZMACORT IN) Inhale 2 puffs into the lungs as needed       UNABLE TO FIND Take 2 capsules by mouth 3 times daily Muscle Mag. 2 caps contain B1 20mg, B2 20mg, B6 10mg, magesium 20mg, manganese 2mg.       UNABLE TO FIND 3 tablets 3 times daily MEDICATION NAME: calcium D-Glucarate   3 caps contain 180mg of elemental calcium.       UNABLE TO FIND 2 tablets 3 times daily MEDICATION NAME: Digestzymes        UNABLE TO FIND 1 tablet daily MEDICATION NAME: Pure Encapsulations       VENTOLIN  (90 BASE) MCG/ACT Inhaler INHALE 2 PUFFS INTO THE LUNGS EVERY 4 HOURS AS NEEDED FOR SHORTNESS OF BREATH OR DIFFICULT BREATHING OR WHEEZING 18 g 3     celecoxib (CELEBREX) 200 MG capsule Take 1 capsule (200 mg) by mouth 2 times daily 60 capsule 3     diazepam (VALIUM) 5 MG tablet Take 1 tablet (5 mg) by mouth 3 times daily as needed for muscle spasms 90 tablet 1     diclofenac (VOLTAREN) 1 % GEL topical gel Apply affected area two times daily PRN using enclosed dosing card. 100 g 3     lidocaine (XYLOCAINE) 5 % ointment Apply quarter size amount to chest and back up to 3 times daily as needed for pain. 350 g 1     methocarbamol (ROBAXIN) 750 MG tablet Take 1 tablet (750 mg) by mouth 4 times daily . Ok to take a 5th Robaxin on very severe days. 360 tablet 1     She is back up to lasix 40 mg  Currently having a hard time getting the calcium in      Allergies  Allergies   Allergen Reactions     Baclofen Other (See Comments) and Unknown     Other reaction(s): Edema, chest pain, seizures.      No Clinical Screening - See Comments Shortness Of  Breath, Palpitations, Anaphylaxis, Itching, Swelling, Difficulty breathing and Rash     Sukhjinder wipes- oral allergy -  July 2015: throat tightness from a Chinese herbal medicine Guillen Raciel Barry Tran     Serotonin Anxiety, Other (See Comments) and Swelling     Seizures      Tree Nuts [Nuts] Shortness Of Breath     Amitriptyline Hcl Swelling     Birch Trees      Potatoes, carrots, cherries, celery, apple, pears, plums, peaches, parsnip, kiwi, hazelnuts, and apricots,      Blue Dyes Itching     Headaches       Cymbalta Other (See Comments)     Flushing, tremor/muscle twitching and edema     Gabapentin Other (See Comments)     edema  Systemic edema, weaned off from Feb to March per Dr. Dowd.    edema     Grass      Mugwort [Artemisia Vulgaris]      Various spices     Ragweeds      Melons, bananas, cucumbers, zucchini.     Topamax      Nortriptyline Itching, Visual Disturbance, Swelling, GI Disturbance, Anxiety, Other (See Comments) and Nausea     Other reaction(s): Swelling       Family History  No thyroid  Family History   Problem Relation Age of Onset     Allergies Mother      Arthritis Mother      CANCER Mother      uterine/uterine     Hypertension Mother      on HCTZ     Hyperlipidemia Mother      CEREBROVASCULAR DISEASE Mother      s/p brain surgery     Breast Cancer Mother      Depression Mother      Anxiety Disorder Mother      Anesthesia Reaction Mother      Asthma Mother      Skin Cancer Mother      HEART DISEASE Father      DIABETES Father      Coronary Artery Disease Father      Hypertension Father      Hyperlipidemia Father      CEREBROVASCULAR DISEASE Father      Multiple strokes     Obesity Father      DIABETES Brother      Depression Brother      Hypertension Brother      CANCER Maternal Grandfather      pancreatic cancer/stomach cancer     Prostate Cancer Maternal Grandfather      Prostrate and Bladder     Other Cancer Maternal Grandfather      Pancreatic 1988, Bladder 1977     CANCER Maternal  Grandmother      uterine     Breast Cancer Maternal Grandmother      Skin Cancer Maternal Grandmother      CANCER Brother 57     pancreatic cancer     DIABETES Brother      Breast Cancer Cousin      Grand mothers sister     Other Cancer Brother      Stage 3 Pancreatic 2-2015     Depression Brother      Asthma Brother      Obesity Brother      Anesthesia Reaction Other      H/a, itching, drug interactions     Asthma Other      CANCER Brother      Pancreatic      Thyroid Disease No family hx of        Social History  Social History   Substance Use Topics     Smoking status: Never Smoker     Smokeless tobacco: Never Used      Comment: no 2nd hand smoke exposure     Alcohol use No      Comment: rare     RN.  ;  - still working.     Physical Exam  /85  Pulse 96  There is no height or weight on file to calculate BMI.  GENERAL :   middle aged woman.    SKIN: Normal color; Alopecia/ scarf on head  EYES: PER,No scleral icterus  NECK no masses. I have performed real time neck US. There are no abnormal masses  LUNGS: clear bilaterally  CARDIAC: RRR S1 S2  NEURO: awake, alert, responds appropriately to questions.  Moves all extremities;    DATA REVIEW    ENDO THYROID LABS-[facility] Latest Ref Rng & Units 5/21/2018 2/16/2018   TSH 0.40 - 4.00 mU/L <0.01 (L) 0.01 (L)   T4 FREE 0.76 - 1.46 ng/dL 1.64 (H) 1.14     ENDO THYROID LABS-[facility] Latest Ref Rng & Units 11/2/2017 7/12/2017   TSH 0.40 - 4.00 mU/L 0.22 (L) 0.02 (L)   T4 FREE 0.76 - 1.46 ng/dL 1.30 1.54 (H)     ENDO CALCIUM LABS-[facility] Latest Ref Rng & Units 5/21/2018   CALCIUM 8.5 - 10.1 mg/dL 8.6   CALCIUM IONIZED 4.4 - 5.2 mg/dL    CALCIUM IONIZED WHOLE BLOOD 4.4 - 5.2 mg/dL    PHOSPHOROUS 2.5 - 4.5 mg/dL 4.0   MAGNESIUM 1.6 - 2.3 mg/dL    ALBUMIN 3.4 - 5.0 g/dL 3.9   BUN 7 - 30 mg/dL 15   CREATININE 0.52 - 1.04 mg/dL 0.98   PARATHYROID HORMONE INTACT 12 - 72 pg/mL    ALKPHOS 40 - 150 U/L 99   25 OH VIT D2 ug/L    25 OH VIT D3 ug/L    25 OH VIT D TOTAL 20 - 75 ug/L     VITAMIN D DEFICIENCY SCREENING 20 - 75 ug/L    PROTEIN, TOTAL 6.8 - 8.8 g/dL 7.2     ENDO CALCIUM LABS-[facility] Latest Ref Rng & Units 2/23/2018   CALCIUM 8.5 - 10.1 mg/dL 8.4 (L)   CALCIUM IONIZED 4.4 - 5.2 mg/dL    CALCIUM IONIZED WHOLE BLOOD 4.4 - 5.2 mg/dL    PHOSPHOROUS 2.5 - 4.5 mg/dL    MAGNESIUM 1.6 - 2.3 mg/dL    ALBUMIN 3.4 - 5.0 g/dL    BUN 7 - 30 mg/dL 12   CREATININE 0.52 - 1.04 mg/dL 0.78   PARATHYROID HORMONE INTACT 12 - 72 pg/mL    ALKPHOS 40 - 150 U/L    25 OH VIT D2 ug/L    25 OH VIT D3 ug/L    25 OH VIT D TOTAL 20 - 75 ug/L    VITAMIN D DEFICIENCY SCREENING 20 - 75 ug/L    PROTEIN, TOTAL 6.8 - 8.8 g/dL      ENDO CALCIUM LABS-[facility] Latest Ref Rng & Units 2/16/2018   CALCIUM 8.5 - 10.1 mg/dL 8.4 (L)   CALCIUM IONIZED 4.4 - 5.2 mg/dL    CALCIUM IONIZED WHOLE BLOOD 4.4 - 5.2 mg/dL    PHOSPHOROUS 2.5 - 4.5 mg/dL    MAGNESIUM 1.6 - 2.3 mg/dL    ALBUMIN 3.4 - 5.0 g/dL 3.0 (L)   BUN 7 - 30 mg/dL 12   CREATININE 0.52 - 1.04 mg/dL 0.86   PARATHYROID HORMONE INTACT 12 - 72 pg/mL    ALKPHOS 40 - 150 U/L 70   25 OH VIT D2 ug/L    25 OH VIT D3 ug/L    25 OH VIT D TOTAL 20 - 75 ug/L    VITAMIN D DEFICIENCY SCREENING 20 - 75 ug/L    PROTEIN, TOTAL 6.8 - 8.8 g/dL 5.8 (L)     EXAMINATION: Ultrasound soft tissue neck on, 11/2/2017 5:37 PM      COMPARISON: Soft tissue neck on 4/26/2017     HISTORY:  Thyroid cancer (H) [C73 (ICD-10-CM)]; Postsurgical  hypothyroidism [E89.0 (ICD-10-CM)]; Papillary carcinoma, follicular  variant (H) [C73 (ICD-10-CM)]; Postsurgical hypoparathyroidism [E89.2  (ICD-10-CM)]       FINDINGS:     Lymph nodes are measured bilaterally with measurements given in  craniocaudal, transverse and AP dimensions as follows:     Right:  Level 1: No evidence of lymphadenopathy  Level 2: No evidence of lymphadenopathy  Level 3: No evidence of lymphadenopathy  Level 4: No evidence of lymphadenopathy  Level 5: No evidence of lymphadenopathy  Level 6: No evidence of lymphadenopathy     Left:  Level 1: No evidence  of lymphadenopathy  Level 2: No evidence of lymphadenopathy  Level 3: No evidence of lymphadenopathy  Level 4: No evidence of lymphadenopathy  Level 5: No evidence of lymphadenopathy  Level 6: No evidence of lymphadenopathy         IMPRESSION:No sonographic evidence of significantly enlarged lymph  nodes in the neck.     I have personally reviewed the examination and initial interpretation  and I agree with the findings.     HAJA ELDER MD    ENDO CALCIUM LABS-[facility] Latest Ref Rng & Units 11/10/2017   CALCIUM 8.5 - 10.1 mg/dL 8.0 (L)   CALCIUM IONIZED 4.4 - 5.2 mg/dL    CALCIUM IONIZED WHOLE BLOOD 4.4 - 5.2 mg/dL    PHOSPHOROUS 2.5 - 4.5 mg/dL    MAGNESIUM 1.6 - 2.3 mg/dL    ALBUMIN 3.4 - 5.0 g/dL 2.6 (L)   BUN 7 - 30 mg/dL 16   CREATININE 0.52 - 1.04 mg/dL 0.90   PARATHYROID HORMONE INTACT 12 - 72 pg/mL    ALKPHOS 40 - 150 U/L 82   25 OH VIT D2 ug/L    25 OH VIT D3 ug/L    25 OH VIT D TOTAL 20 - 75 ug/L    VITAMIN D DEFICIENCY SCREENING 20 - 75 ug/L    PROTEIN, TOTAL 6.8 - 8.8 g/dL 6.0 (L)     ENDO CALCIUM LABS-[facility] Latest Ref Rng & Units 11/6/2017   CALCIUM 8.5 - 10.1 mg/dL 7.9 (L)   CALCIUM IONIZED 4.4 - 5.2 mg/dL    CALCIUM IONIZED WHOLE BLOOD 4.4 - 5.2 mg/dL    PHOSPHOROUS 2.5 - 4.5 mg/dL    MAGNESIUM 1.6 - 2.3 mg/dL    ALBUMIN 3.4 - 5.0 g/dL 2.7 (L)   BUN 7 - 30 mg/dL 17   CREATININE 0.52 - 1.04 mg/dL 0.73   PARATHYROID HORMONE INTACT 12 - 72 pg/mL    ALKPHOS 40 - 150 U/L 52   25 OH VIT D2 ug/L    25 OH VIT D3 ug/L    25 OH VIT D TOTAL 20 - 75 ug/L    VITAMIN D DEFICIENCY SCREENING 20 - 75 ug/L    PROTEIN, TOTAL 6.8 - 8.8 g/dL 5.2 (L)     ENDO CALCIUM LABS[facility] Latest Ref Rng & Units 11/3/2017   CALCIUM 8.5 - 10.1 mg/dL 8.6   CALCIUM IONIZED 4.4 - 5.2 mg/dL    CALCIUM IONIZED WHOLE BLOOD 4.4 - 5.2 mg/dL    PHOSPHOROUS 2.5 - 4.5 mg/dL    MAGNESIUM 1.6 - 2.3 mg/dL    ALBUMIN 3.4 - 5.0 g/dL 3.3 (L)   BUN 7 - 30 mg/dL 26   CREATININE 0.52 - 1.04 mg/dL 0.82   PARATHYROID HORMONE INTACT 12 - 72 pg/mL    ALKPHOS 40 -  150 U/L 75   25 OH VIT D2 ug/L    25 OH VIT D3 ug/L    25 OH VIT D TOTAL 20 - 75 ug/L    VITAMIN D DEFICIENCY SCREENING 20 - 75 ug/L    PROTEIN, TOTAL 6.8 - 8.8 g/dL 6.2 (L)     ENDO CALCIUM LABS-[facility] Latest Ref Rng & Units 11/2/2017   CALCIUM 8.5 - 10.1 mg/dL 8.8   CALCIUM IONIZED 4.4 - 5.2 mg/dL    CALCIUM IONIZED WHOLE BLOOD 4.4 - 5.2 mg/dL    PHOSPHOROUS 2.5 - 4.5 mg/dL 3.6   MAGNESIUM 1.6 - 2.3 mg/dL    ALBUMIN 3.4 - 5.0 g/dL 3.6   BUN 7 - 30 mg/dL 23   CREATININE 0.52 - 1.04 mg/dL 0.88   PARATHYROID HORMONE INTACT 12 - 72 pg/mL    ALKPHOS 40 - 150 U/L 61   25 OH VIT D2 ug/L <5   25 OH VIT D3 ug/L 48   25 OH VIT D TOTAL 20 - 75 ug/L <53   VITAMIN D DEFICIENCY SCREENING 20 - 75 ug/L    PROTEIN, TOTAL 6.8 - 8.8 g/dL 6.6 (L)

## 2018-05-21 NOTE — MR AVS SNAPSHOT
After Visit Summary   5/21/2018    Tisha Arias    MRN: [id number]           Patient Information     Date Of Birth          1960        Visit Information        Provider Department      5/21/2018 4:20 PM Avelina Castro MD [facility]        Today's Diagnoses     Postsurgical hypothyroidism        Papillary carcinoma, follicular variant (H)        Metastasis to cervical lymph node (H)          Care Instructions    Get labs for me today or at your earliest convenience.               Follow-ups after your visit        Follow-up notes from your care team     Return in about 6 months (around 11/21/2018).      Your next 10 appointments already scheduled     May 21, 2018  5:00 PM CDT   CT CHEST ABDOMEN PELVIS W/O & W CONTRAST with UCCT2   [facility] ([facility])    [address] [address] [phone]           Please bring any scans or X-rays taken at other hospitals, if similar tests were done. Also bring a list of your medicines, including vitamins, minerals and over-the-counter drugs. It is safest to leave personal items at home.  Be sure to tell your doctor:   If you have any allergies.   If there s any chance you are pregnant.   If you are breastfeeding.  How to prepare:   Do not eat or drink for 2 hours before your exam. If you need to take medicine, you may take it with small sips of water. (We may ask you to take liquid medicine as well.)   Please wear loose clothing, such as a sweat suit or jogging clothes. Avoid snaps, zippers and other metal. We may ask you to undress and put on a hospital gown.  Please arrive 30 minutes early for your CT. Once in the department you might be asked to drink water 15-20 minutes prior to your exam.  If indicated you may be asked to drink an oral contrast in advance of your CT.  If this is the case, the imaging team will let you know or be in contact with you  prior to your appointment  Patients over 70 or patients with diabetes or kidney problems:   If you haven t had a blood test (creatinine test) within the last 30 days, the Cardiologist/Radiologist may require you to get this test prior to your exam.  If you have diabetes:   Continue to take your metformin medication on the day of your exam  If you have any questions, please call the Imaging Department where you will have your exam.            May 29, 2018  3:30 PM CDT   (Arrive by 3:15 PM)   Return Visit with Shahla Crawley MD   [facility] ([facility])    [address]  [address]  [facility] [address]   [phone]            May 29, 2018  4:00 PM CDT   RETURN ONC with Garima Sauceda MD   [facility] ([facility])    [address] [place] [address]  [address]  [facility] [address]   [phone]            Jun 06, 2018  7:00 AM CDT   (Arrive by 6:45 AM)   Return Visit with Edu Benitez MD   [facility] ([facility])    [address] [place] [address]  [address]  [facility] [address]   [phone]            Nov 26, 2018  4:40 PM CST   (Arrive by 4:25 PM)   RETURN ENDOCRINE with Avelina Castro MD   [facility] ([facility])    [address] [place] [address]  3rd Floor  [facility] [address]   [phone]              Who to contact     Please call your clinic at [phone] to:    Ask questions about your health    Make or cancel appointments    Discuss your medicines    Learn about your test results    Speak to your doctor            Additional Information About Your Visit        [employer] Information     [employer] gives you secure access to your electronic health record. If you see a primary care provider, you can also send messages to your care team and make appointments. If you have questions, please call your primary care  clinic.  If you do not have a primary care provider, please call [phone] and they will assist you.      [employer] is an electronic gateway that provides easy, online access to your medical records. With [employer], you can request a clinic appointment, read your test results, renew a prescription or communicate with your care team.     To access your existing account, please contact your [facility] or call [phone] for assistance.        Care EveryWhere ID     This is your Care EveryWhere ID. This could be used by other organizations to access your Winter medical records  [id number]        Your Vitals Were     Pulse                   96            Blood Pressure from Last 3 Encounters:   05/21/18 144/85   04/19/18 102/72   02/28/18 118/61    Weight from Last 3 Encounters:   02/23/18 89.1 kg (196 lb 8 oz)   02/01/18 86.9 kg (191 lb 8 oz)   01/29/18 81.1 kg (178 lb 14.4 oz)              Today, you had the following     No orders found for display         Today's Medication Changes          These changes are accurate as of 5/21/18  4:54 PM.  If you have any questions, ask your nurse or doctor.               These medicines have changed or have updated prescriptions.        Dose/Directions    calcitRIOL 0.25 MCG capsule   Commonly known as:  ROCALTROL   This may have changed:    - how much to take  - how to take this  - when to take this  - additional instructions   Used for:  Postsurgical hypothyroidism, Papillary carcinoma, follicular variant (H), Metastasis to cervical lymph node (H)        TAKE 2 CAPSULES BY MOUTH EVERY MORNING AND TAKE 1 CAPSULE BY MOUTH EVERY NIGHT AT BEDTIME   Quantity:  90 capsule   Refills:  11       cyclobenzaprine 10 MG tablet   Commonly known as:  FLEXERIL   This may have changed:  when to take this   Used for:  High grade B-cell lymphoma (H)        Dose:  10 mg   Take 1 tablet (10 mg) by mouth 3 times daily as needed   Quantity:  30 tablet   Refills:   0       levothyroxine 112 MCG tablet   Commonly known as:  SYNTHROID/LEVOTHROID   This may have changed:    - how much to take  - additional instructions   Used for:  Postsurgical hypothyroidism, Papillary carcinoma, follicular variant (H), Postsurgical hypoparathyroidism (H), Thyroid cancer (H)        Mon-Sat: (2 tabs daily) Sunday: 3 tabs   Quantity:  189 tablet   Refills:  3       * ondansetron 8 MG ODT tab   Commonly known as:  ZOFRAN-ODT   This may have changed:  when to take this   Used for:  High grade B-cell lymphoma (H), Nausea and vomiting, intractability of vomiting not specified, unspecified vomiting type        Dose:  8 mg   Take 1 tablet (8 mg) by mouth every 8 hours as needed   Quantity:  30 tablet   Refills:  1       * ondansetron 8 MG ODT tab   Commonly known as:  ZOFRAN-ODT   This may have changed:  Another medication with the same name was changed. Make sure you understand how and when to take each.   Used for:  High grade B-cell lymphoma (H), Nausea and vomiting, intractability of vomiting not specified, unspecified vomiting type, Breast cancer (H)        Take 1 tablet (8 mg) by mouth every 8 hours as needed   Quantity:  30 tablet   Refills:  0       * Notice:  This list has 2 medication(s) that are the same as other medications prescribed for you. Read the directions carefully, and ask your doctor or other care provider to review them with you.         Where to get your medicines      These medications were sent to [id number] - [place], [place] - [address] [place]  [address] [address] [place] [address]     Phone:  [phone]     calcitRIOL 0.25 MCG capsule                Primary Care Provider Office Phone # Fax #    Shahla Crawley [phone]       [address]  [facility] [address]        Equal Access to Services     RODRIGO ZAMORA AH: Kevin johnsono Soesteban, waaxda luqadaha, qaybta kaalmajosseline lama, ranjan martin.  So [facility] [phone].    ATENCIÓN: Si lance mccollum, tiene a stiles disposición servicios gratuitos de asistencia lingüística. Oneil al [phone].    We comply with applicable federal civil rights laws and [place] laws. We do not discriminate on the basis of race, color, national origin, age, disability, sex, sexual orientation, or gender identity.            Thank you!     Thank you for choosing [facility]  for your care. Our goal is always to provide you with excellent care. Hearing back from our patients is one way we can continue to improve our services. Please take a few minutes to complete the written survey that you may receive in the mail after your visit with us. Thank you!             Your Updated Medication List - Protect others around you: Learn how to safely use, store and throw away your medicines at www.disposemymeds.org.          This list is accurate as of 5/21/18  4:54 PM.  Always use your most recent med list.                   Brand Name Dispense Instructions for use Diagnosis    ACAI RAMIREZ PO      Take 50 mg by mouth        acetaminophen 325 MG tablet    TYLENOL    100 tablet    Take 2 tablets (650 mg) by mouth every 4 hours as needed for mild pain or fever    High grade B-cell lymphoma (H)       acyclovir 400 MG tablet    ZOVIRAX    30 tablet    Take 1 tablet (400 mg) by mouth 2 times daily    High grade B-cell lymphoma (H)       aluminum chloride 20 % external solution    DRYSOL    60 mL    Apply topically At Bedtime    Rash, Intertrigo       AZMACORT IN      Inhale 2 puffs into the lungs as needed        bisacodyl 5 MG EC tablet     30 tablet    Take 3 tablets (15 mg) by mouth daily as needed for constipation    Constipation, unspecified constipation type       calcitRIOL 0.25 MCG capsule    ROCALTROL    90 capsule    TAKE 2 CAPSULES BY MOUTH EVERY MORNING AND TAKE 1 CAPSULE BY MOUTH EVERY NIGHT AT BEDTIME    Postsurgical hypothyroidism, Papillary carcinoma, follicular variant  (H), Metastasis to cervical lymph node (H)       calcium Citrate-vitamin D 500-400 MG-UNIT Chew     60 tablet    Take 1 tablet by mouth 3 times daily        celecoxib 200 MG capsule    celeBREX    56 capsule    Take 1 capsule (200 mg) by mouth 2 times daily    Chest wall pain       cetirizine 10 MG tablet    ZYRTEC ALLERGY    90 tablet    Take 1 tablet (10 mg) by mouth 3 times daily On hold for lab test.    High grade B-cell lymphoma (H)       COMPOUND CONTAINING CONTROLLED SUBSTANCE - PHARMACY TO MIX COMPOUNDED MEDICATION    CMPD RX    120 g    Apply small amount to affected area two times daily.    Neoplasm related pain       cromolyn 4 % ophthalmic solution    OPTICROM    10 mL    Place 1 drop into both eyes 4 times daily    Idiopathic mast cell activation syndrome (H)       cromolyn sodium 5.2 MG/ACT Aers Inhaler    NASALCROM    1 Bottle    SPRAY ONE SPRAY( 1 ML) IN NOSTRIL DAILY    Mast cell disease, systemic       [employer] GUMMY BE[employer] CHILDREN Chew     120 tablet    Take 5 g by mouth daily (2 gummy= 5 g =1 serving)    High grade B-cell lymphoma (H)       cyclobenzaprine 10 MG tablet    FLEXERIL    30 tablet    Take 1 tablet (10 mg) by mouth 3 times daily as needed    High grade B-cell lymphoma (H)       diazepam 5 MG tablet    VALIUM    90 tablet    Take 1 tablet (5 mg) by mouth 3 times daily as needed for muscle spasms    Chest wall pain       diclofenac 1 % Gel topical gel    VOLTAREN    100 g    Apply affected area two times daily PRN using enclosed dosing card.    Myofascial pain       EPINEPHrine 0.3 MG/0.3ML injection 2-pack    EPIPEN 2-CARIDAD    0.6 mL    Inject 0.3 mLs (0.3 mg) into the muscle once as needed for anaphylaxis        fluticasone 50 MCG/ACT spray    FLONASE    1 Bottle    Spray 1-2 sprays into both nostrils daily    Bacterial sinusitis       * furosemide 20 MG tablet    LASIX    60 tablet    Take 1 tablet (20 mg) by mouth 2 times daily    Localized edema       * furosemide 20 MG tablet     LASIX    60 tablet    Take 1 tablet (20 mg) by mouth 2 times daily    Bilateral lower extremity edema       hydrochlorothiazide 12.5 MG capsule    MICROZIDE    30 capsule    Take 1 capsule (12.5 mg) by mouth daily    Hypocalcemia       levofloxacin 750 MG tablet    LEVAQUIN    10 tablet    Take 1 tablet (750 mg) by mouth daily    Bacterial sinusitis       levothyroxine 112 MCG tablet    SYNTHROID/LEVOTHROID    189 tablet    Mon-Sat: (2 tabs daily) Sunday: 3 tabs    Postsurgical hypothyroidism, Papillary carcinoma, follicular variant (H), Postsurgical hypoparathyroidism (H), Thyroid cancer (H)       lidocaine 5 % ointment    XYLOCAINE    350 g    Apply quarter size amount to chest and back up to 3 times daily as needed for pain.    Chest wall pain       lidocaine visc 2% 2.5mL/5mL & maalox/mylanta w/ simeth 2.5mL/5mL & diphenhydrAMINE 5mg/5mL Susp suspension    [facility] Mouthwash [facility]    360 mL    Swish and spit 10 mLs in mouth every 6 hours as needed for mouth sores    Stomatitis and mucositis, High grade B-cell lymphoma (H)       * lidocaine-prilocaine cream    EMLA    30 g    Apply topically as needed (port access pain.)    Neoplasm related pain, Chest wall pain       * lidocaine-prilocaine cream    EMLA    60 g    Apply topically as needed for moderate pain    High grade B-cell lymphoma (H)       magic mouthwash suspension (diphenhydrAMINE, lidocaine, aluminum-magnesium & simethicone) Susp compounding kit     237 mL    Swish and spit 5-10 mLs in mouth every 6 hours as needed for mouth sores    Stomatitis and mucositis, High grade B-cell lymphoma (H)       methocarbamol 750 MG tablet    ROBAXIN     Take 1 tablet (750 mg) by mouth 4 times daily . Ok to take a 5th Robaxin on very severe days.    Myofascial pain       montelukast 10 MG tablet    SINGULAIR    60 tablet    Take 2 tablets (20 mg) by mouth 2 times daily    Idiopathic mast cell activation syndrome (H)       morphine 30 MG 12 hr tablet    MS CONTIN    120  tablet    Take 1 tablet (30 mg) by mouth every 8 hours . Take an addition pill at night such that your evening dose is 60mg    Neoplasm related pain       * ondansetron 8 MG ODT tab    ZOFRAN-ODT    30 tablet    Take 1 tablet (8 mg) by mouth every 8 hours as needed    High grade B-cell lymphoma (H), Nausea and vomiting, intractability of vomiting not specified, unspecified vomiting type       * ondansetron 8 MG ODT tab    ZOFRAN-ODT    30 tablet    Take 1 tablet (8 mg) by mouth every 8 hours as needed    High grade B-cell lymphoma (H), Nausea and vomiting, intractability of vomiting not specified, unspecified vomiting type, Breast cancer (H)       * order for DME     1 Units    Compression stockings, medium grade, please measure and fit. Please dispense a pair.    Peripheral edema       * order for DME     2 Units    Equipment being ordered: compression stockings. 20-30 mm or 30 - 40 mm as patient can tolerate. Physical therapy to determine if they should be above or below the knee.    Venous stasis       * order for DME     2 Device    Equipment being ordered: compression bra    Malignant neoplasm of right female breast (H)       order for DME     1 Units    Compression socks - knee high 20-30 mmHg zippered if possible    Lower extremity edema       oxyCODONE IR 30 MG tablet    ROXICODONE    180 tablet    Take 1 tablet (30 mg) by mouth every 4 hours as needed for moderate to severe pain    High grade B-cell lymphoma (H)       polyethylene glycol powder    MIRALAX    510 g    Take 17 g (1 capful) by mouth daily    Acute constipation       potassium chloride SA 20 MEQ CR tablet    KLOR-CON    60 tablet    Take 1 tablet (20 mEq) by mouth 2 times daily    Hypokalemia       PROBIOTIC DAILY PO      Take 1 capsule by mouth daily Lacto acid bifidobacterium    Breast cancer, unspecified laterality, Thyroid cancer (H), Chronic arthralgias of knees and hips, unspecified laterality       prochlorperazine 10 MG tablet     COMPAZINE     Take 10 mg by mouth as needed        ranitidine 75 MG tablet    ZANTAC    90 tablet    Take 1 tablet (75 mg) by mouth 3 times daily    Mast cell disease, systemic       senna-docusate 8.6-50 MG per tablet    SENOKOT-S;PERICOLACE    60 tablet    Take 1-2 tablets by mouth 2 times daily as needed for constipation    Acute constipation       SUMAtriptan 50 MG tablet    IMITREX     Take 50 mg by mouth as needed        tamoxifen 20 MG tablet    NOLVADEX    90 tablet    Take 1 tablet (20 mg) by mouth daily    Ductal carcinoma in situ (DCIS) of breast, unspecified laterality       triamcinolone 0.025 % ointment    KENALOG     Apply topically as needed        UNABLE TO FIND      3 tablets 3 times daily MEDICATION NAME: calcium D-Glucarate  3 caps contain 180mg of elemental calcium.        UNABLE TO FIND      Take 2 capsules by mouth 3 times daily Muscle Mag. 2 caps contain B1 20mg, B2 20mg, B6 10mg, magesium 20mg, manganese 2mg.        UNABLE TO FIND      2 tablets 3 times daily MEDICATION NAME: Digestzymes    Thyroid cancer (H), Postsurgical hypothyroidism, Postsurgical hypoparathyroidism (H)       UNABLE TO FIND      1 tablet daily MEDICATION NAME: Pure Encapsulations    Thyroid cancer (H), Postsurgical hypothyroidism, Postsurgical hypoparathyroidism (H)       VENTOLIN  (90 Base) MCG/ACT Inhaler   Generic drug:  albuterol     18 g    INHALE 2 PUFFS INTO THE LUNGS EVERY 4 HOURS AS NEEDED FOR SHORTNESS OF BREATH OR DIFFICULT BREATHING OR WHEEZING    Mild intermittent asthma without complication       vitamin D3 2000 units Caps     60 capsule    Take 5,000 Units by mouth daily Takes 2 tabs daily    Thyroid cancer (H), Postsurgical hypothyroidism, Papillary carcinoma, follicular variant (H), Postsurgical hypoparathyroidism (H)       * Notice:  This list has 9 medication(s) that are the same as other medications prescribed for you. Read the directions carefully, and ask your doctor or other care provider to  review them with you.

## 2018-05-21 NOTE — LETTER
5/21/2018       RE: Tisha Arias  [address] [place] [address]     Dear Colleague,    Thank you for referring your patient, Tisha Arias, to the [facility] ENDOCRINOLOGY at [facility]. Please see a copy of my visit note below.    Endocrinology Consult Note    Attending ASSESSMENT/PLAN:     1.  Papillary thyroid carcinoma, follicular variant, + ETE (minimal), bilateral, MF, node +.  MACIS is < 6 but TNM is stage III, ANSELMO recurrence risk Intermediate.  She has been treated with total Tx and CND and RRA. She now s/p ETOH treatment to 2  left neck LNs (left level 6 and  left level 3).  Left level 6 LN is gone.      Persistent thyroglobulinemia indicates we still have thyroid tissue. Last Tg from 11/17 was higher.  Repeat Tg    2.  Cervical adenopathy   A) biopsy proven metastatic thyroid cancer  Left level 3  Cervical LN  (0.5 cm, round) positive for papillary thyroid cancer cytology. This was ETOH treated 8/19/14  And 10/24/14.  I believe the treatment has resolved  this node.    Left level 6 has very high Tg consistent with metastatic thyroid cancer. This was treated with ETOh on 8/19/14 and 10/24/14, and I think also on 12/30 and 12/31/14.  (it had benign cytology on repeat FNAB  12/30/14).  This is gone on 11/15 US , 5/16 and 11/16 US  Left level 3 medial to left level 6 - benign on FNAB; I do not think this was treated. This persists and appear benign     3.  Post surgical hypothyroidism. Treat to TSH < 0.4 because of # 1.  She is on  LT4  224*7.5/week, divided (mean 240 mcg/day) with TSH at target.     4.   Hypocalcemia with normal PTH. She has low albumin.   I think the problem is more one of calcium malabsorption (related to past Rachael en Y) than hypoparathyroidisim, though perhaps she also has subclinical hypoparathyroidism which makes it harder to compensate.   She is having a hard time getting the calcium in, possibly having some low calcium  symtpoms.     History of gregory en Y gastric bypass is potentially going to affect absorption of the calcium/ vitamin D/ L-T4. I believe this is an important history relative to the hypoparathyoridsim    Avelina Castro MD        Cc/  HISTORY OF PRESENT ILLNESS Tisha presents today for follow up of thyroid cancer and post surgical hypothyroidism. She also has a history of breast caner and lymphoma.  She was lst seen by me 11/17.  At that time she was on   LT4  224 x 7.5/week  , divided 240 mcg/day over the course of a week.  At the last appt there was a ? Of rise in Tg . I suggested she repeat the labs in a few months . It appears my already placed orders weren't used, and so the wrong tests were done in 2/18.       Thyroid cancer was discovered in 2013 in the course of getting PET followup for new diagnosis of breast cancer.    Her course can be summarized as follows:  7/10/13  total Thyroidectomy and CND  removing bilateral, multifocal papillary thyroid carcinoma, left 1.3 cm (Follicular variant) with minimal extrathyoridal extension and right 0.25 cm (microcarcinoma variant).  2/3 left level 6 LNs were + for PCT (MACIS 5.55, TNM stage III).    9/5/13: Thyrogen stimulated 105 mCi 131I .  Post treatment TBS showed intense neck uptake.      6/11/14 FNAB of left  level 3 node with ? microcalcifications cytology positive for malignancy - papillary Ca, needle wash Tg 6.4 ng/ml.    left level 6 LN  Cytology  benign butneedle wash Tg 1213 ng/ml.   8/18/14 and on 10/24/14 ETOH injection into these  2 lymph nodes.      12/30/14  repeat FNAB of medial to level 6 left level 3 and left level 6 LN .  FNAB of the left level 3 LN and left level 6 LN on 12/30 was read as benign.  Needle wash Tg was < 0.3 (left level 3) and 0.6 (left level 6).    12/30 and 12/31/14  ETOH ablation of left level 6 LN (per the images), though the report states this is left level 3 LN that was ablated.     We have the following tumor marker  data  5/2/13: Tg 45, ANSELMO < 0.4, TSH   SURGERY  9/5/13: Tg 12.6 ng/ml, ANSELMO < 0.4 ,   I131 105 mCi  10/22/13: Tg 3.7, Anselmo < 0.4 U/ml, TSH 0.27  3/7/14: Tg 5.4, ANSEMLO < 0.4, TSH   6/3/14: Tg 1.3, ANSELMO < 0.4, TSH  Not done  7/18/14 Tg 13.6, ANSELMO < 0.4, TSH 16.9 Thyrogen stimulated  123I TBS negative. We did not see uptake in the cervical LNs we know are biopsy proven PCT (as is often the case).   ETOH treatments 8/14 and 10/14  11/25/14 Tg 3.5, ANSELMO < 0.4, TSH 4.95  12/30/14 ETOH treatment  2/17/15: Tg 1.4, ANSELMO < 0.4, TSH 0.41  5/18/15: Tg 1.3, ANSELMO < 0.4, TSH 0.42--  12/7/15: Tg 1.3, ANSELMO < 0.4, TSH 0.12  5/2/16 Tg 1.1 ng/ml , ANSELMO  < 0.4 U/ml.   11/2/16: Tg 1.4, ANSELMO < 0.4, TSH 0.45.  -   4/26/17: Tg 1.1, ANSELMO < 0.4, TSH 0.5  11/2/17  Tg 1.3 , ANSELMO < 0.4 - - concurrent remeasure 4/16 Tg 0.84.  TSH 0.22  2/6/18: Ca 8.4, albumin 3, total protein 5.8  2/23/18: Ca 8.4, creatinine 0.78    Neck US 11/2/17 (compared with 4/26/17 ):    Right level 4 # 1 not seen  (was 0.7 x 0.5 x 0.6 cm  Left level 3 # 1 (medial to IJ): not seen on stills - I think I see it on cine -  (was  0.6 x 0.5 x 1.2  (was 0.7  0.5 x 1.3 ( was  0.6 x 0.5 x 1.4 cm prominent hilum   2/27/18 PET /CT: negative per my review of images today; read as minimal uptake right 10th rib bearing paravertebral soft tissue lesion-     ROS        Weight loss  Hard time eating  Cardiac: negative  Respiratory: HEIN on long distance or climbing stairs;   GI: constipation  Hands will cramp and twist; this last occurred last night  Leg cramps have been relieved by compression stockings. 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 No real need for Epipen for this alone.     GDM (gestational diabetes mellitus)     w first pregnancy only     Gestational hypertension      Lymphedema     jackson arms, chest     Migraine      Neuropathy associated with cancer (H)      Papillary carcinoma, follicular variant (H) 2013    pT3, N1, Mx, thyroid     Post-surgical hypothyroidism      Renal disease     kidney stones     Rhinitis, allergic to other allergen      Right Breast mass and microcalcifications 2011     Seasonal allergic conjunctivitis      Seasonal allergic rhinitis 11 skin tests pos. for: dog/M/T/G/W--NEGATIVE FOOD TESTS FOR: shrimp, crab, lobster, coconut     Past Surgical History:   Procedure Laterality Date     BACK SURGERY  ,    Bulging disc w nerve root impigment     BIOPSY BREAST      right     BIOPSY BREAST  11    right, core and sterotactic     BONE MARROW BIOPSY, BONE SPECIMEN, NEEDLE/TROCAR Left 10/3/2017    Procedure: BIOPSY BONE MARROW;  Bone Marrow Biopsy with aspirate;  Surgeon: Amelia Watkins PA-C;  Location: [facility] OR     BYPASS GASTRIC, CHOLECYSTECTOMY, COMBINED  2007 LAPAROSCOPIC GASTRIC RESTRICTIVE PX, W/GASTRIC BYPASS/ GAMALIEL-EN-Y, < 150CM      Gamaliel en Y?      SECTION       COLONOSCOPY      normal     COSMETIC SURGERY  2012    Final Stage of Mastectomy     DAVINCI HYSTERECTOMY TOTAL, BILATERAL SALPINGO-OOPHORECTOMY, COMBINED  2012    Procedure: COMBINED DAVINCI HYSTERECTOMY TOTAL, SALPINGO-OOPHORECTOMY;  Davinci Total Laparoscopic Hysterectomy, Bilateral Salpingo Oophorectomy, Pelvic Washings, Cystoscopy;  Surgeon: Evie Sheikh MD;  Location: [facility] OR     ENT SURGERY       ESOPHAGOSCOPY, GASTROSCOPY, DUODENOSCOPY (EGD), COMBINED N/A 2017    Procedure: COMBINED ENDOSCOPIC ULTRASOUND, ESOPHAGOSCOPY, GASTROSCOPY, DUODENOSCOPY (EGD), FINE NEEDLE ASPIRATE/BIOPSY;  Esophagogastroduodenoscopy, Endoscopic  Ultrasound, with fine needle biopsy aspirate;  Surgeon: Patrick Robles MD;  Location: [facility] OR     GI SURGERY  11-    Rachael-en Y w cholecystectomy     GYN SURGERY  1/6/89,1/10/92    2 c-sections     HYSTERECTOMY, PAP NO LONGER INDICATED  12/12    DeVinci assister lap hyst with BSO     INSERT PORT VASCULAR ACCESS Right 10/4/2017    Procedure: INSERT PORT VASCULAR ACCESS;  Port Placement;  Surgeon: Jc Goodman PA-C;  Location: [facility] OR     IRRIGATION AND DEBRIDEMENT BREAST  3/1/2012    Procedure:IRRIGATION AND DEBRIDEMENT BREAST; Irrigation and Debridement, Wound Closure Right Breast; Surgeon:JUNG GUILLEN; Location:[facility] OR     MASTECTOMY, RECONSTRUCT BREAST, COMBINED  1/30/2012    Procedure:COMBINED MASTECTOMY, RECONSTRUCT BREAST; Bilateral Mastectomies, Right Axillary Cogswell Node Biopsy, Bilateral Breast Reconstruction with Tissue Expanders, Reconstruction of inframammary fold, bilateral pain management systems; Surgeon:ANUPAM CAMPBELL; Location:[facility] OR     RECONSTRUCT BREAST  8/31/2012    Procedure: RECONSTRUCT BREAST;  Bilateral 2nd stage breast reconstruction, revision,       SOFT TISSUE SURGERY  1-30-12    Mastectomy w severe myofascial pain syndrome     THYROIDECTOMY  7/10/2013    Procedure: THYROIDECTOMY;  Total Thyroidectomy with central neck dissection;  Surgeon: Indiana Sneed MD;  Location: [facility] OR     TONSILLECTOMY      childhood       Medications  Current Outpatient Prescriptions   Medication Sig Dispense Refill     BRIANA RAMIREZ PO Take 50 mg by mouth       acetaminophen (TYLENOL) 325 MG tablet Take 2 tablets (650 mg) by mouth every 4 hours as needed for mild pain or fever 100 tablet      acyclovir (ZOVIRAX) 400 MG tablet Take 1 tablet (400 mg) by mouth 2 times daily 30 tablet 0     aluminum chloride (DRYSOL) 20 % external solution Apply topically At Bedtime 60 mL 3     bisacodyl (DULCOLAX) 5 MG EC tablet Take 3 tablets (15 mg) by mouth daily as needed for constipation 30 tablet  0     calcitRIOL (ROCALTROL) 0.25 MCG capsule TAKE 2 CAPSULES BY MOUTH EVERY MORNING AND TAKE 1 CAPSULE BY MOUTH EVERY NIGHT AT BEDTIME 90 capsule 11     calcium Citrate-vitamin D 500-400 MG-UNIT CHEW Take 1 tablet by mouth 3 times daily 60 tablet      cetirizine (ZYRTEC ALLERGY) 10 MG tablet Take 1 tablet (10 mg) by mouth 3 times daily On hold for lab test. 90 tablet 0     Cholecalciferol (VITAMIN D3) 2000 UNITS CAPS Take 5,000 Units by mouth daily Takes 2 tabs daily 60 capsule 0     COMPOUND CONTAINING CONTROLLED SUBSTANCE (CMPD RX) - PHARMACY TO MIX COMPOUNDED MEDICATION Apply small amount to affected area two times daily. 120 g 6     cromolyn (OPTICROM) 4 % ophthalmic solution Place 1 drop into both eyes 4 times daily 10 mL 0     cromolyn sodium (NASALCROM) 5.2 MG/ACT AERS Inhaler SPRAY ONE SPRAY( 1 ML) IN NOSTRIL DAILY 1 Bottle 6     [employer] FIBER GUMMY BEARS CHILDREN CHEW Take 5 g by mouth daily (2 gummy= 5 g =1 serving) 120 tablet 3     cyclobenzaprine (FLEXERIL) 10 MG tablet Take 1 tablet (10 mg) by mouth 3 times daily as needed (Patient taking differently: Take 10 mg by mouth 3 times daily ) 30 tablet 0     EPINEPHrine (EPIPEN 2-CARIDAD) 0.3 MG/0.3ML injection Inject 0.3 mLs (0.3 mg) into the muscle once as needed for anaphylaxis 0.6 mL 3     fluticasone (FLONASE) 50 MCG/ACT spray Spray 1-2 sprays into both nostrils daily 1 Bottle 11     furosemide (LASIX) 20 MG tablet Take 1 tablet (20 mg) by mouth 2 times daily 60 tablet 3     furosemide (LASIX) 20 MG tablet Take 1 tablet (20 mg) by mouth 2 times daily 60 tablet 0     hydrochlorothiazide (MICROZIDE) 12.5 MG capsule Take 1 capsule (12.5 mg) by mouth daily 30 capsule 0     levofloxacin (LEVAQUIN) 750 MG tablet Take 1 tablet (750 mg) by mouth daily 10 tablet 0     levothyroxine (SYNTHROID/LEVOTHROID) 112 MCG tablet Mon-Sat: (2 tabs daily) Sunday: 3 tabs (Patient taking differently: 112 mcg Mon-Sat: (2 tabs daily) Sunday: 3 tabs) 189 tablet 3     lidocaine-prilocaine  (EMLA) cream Apply topically as needed for moderate pain 60 g 1     lidocaine-prilocaine (EMLA) cream Apply topically as needed (port access pain.) 30 g 1     magic mouthwash (FIRST-MOUTHWASH BLM) suspension Swish and spit 5-10 mLs in mouth every 6 hours as needed for mouth sores 237 mL 1     magic mouthwash suspension (diphenhydrAMINE, lidocaine, aluminum-magnesium & simethicone) Swish and spit 10 mLs in mouth every 6 hours as needed for mouth sores 360 mL 1     montelukast (SINGULAIR) 10 MG tablet Take 2 tablets (20 mg) by mouth 2 times daily 60 tablet 0     morphine (MS CONTIN) 30 MG 12 hr tablet Take 1 tablet (30 mg) by mouth every 8 hours . Take an addition pill at night such that your evening dose is 60mg 120 tablet 0     ondansetron (ZOFRAN-ODT) 8 MG ODT tab Take 1 tablet (8 mg) by mouth every 8 hours as needed 30 tablet 0     ondansetron (ZOFRAN-ODT) 8 MG ODT tab Take 1 tablet (8 mg) by mouth every 8 hours as needed (Patient taking differently: Take 8 mg by mouth every 8 hours ) 30 tablet 1     order for DME Compression socks - knee high 20-30 mmHg zippered if possible 1 Units 11     order for DME Equipment being ordered: compression stockings. 20-30 mm or 30 - 40 mm as patient can tolerate. Physical therapy to determine if they should be above or below the knee. 2 Units 4     order for DME Equipment being ordered: compression bra 2 Device 1     order for DME Compression stockings, medium grade, please measure and fit. Please dispense a pair. 1 Units 0     oxyCODONE IR (ROXICODONE) 30 MG tablet Take 1 tablet (30 mg) by mouth every 4 hours as needed for moderate to severe pain 180 tablet 0     polyethylene glycol (MIRALAX) powder Take 17 g (1 capful) by mouth daily 510 g 0     potassium chloride SA (KLOR-CON) 20 MEQ CR tablet Take 1 tablet (20 mEq) by mouth 2 times daily 60 tablet 3     Probiotic Product (PROBIOTIC DAILY PO) Take 1 capsule by mouth daily Lacto acid bifidobacterium       prochlorperazine  (COMPAZINE) 10 MG tablet Take 10 mg by mouth as needed  3     ranitidine (ZANTAC) 75 MG tablet Take 1 tablet (75 mg) by mouth 3 times daily 90 tablet 0     senna-docusate (SENOKOT-S;PERICOLACE) 8.6-50 MG per tablet Take 1-2 tablets by mouth 2 times daily as needed for constipation 60 tablet 0     SUMAtriptan (IMITREX) 50 MG tablet Take 50 mg by mouth as needed  3     tamoxifen (NOLVADEX) 20 MG tablet Take 1 tablet (20 mg) by mouth daily 90 tablet 3     triamcinolone (KENALOG) 0.025 % ointment Apply topically as needed  3     Triamcinolone Acetonide (AZMACORT IN) Inhale 2 puffs into the lungs as needed       UNABLE TO FIND Take 2 capsules by mouth 3 times daily Muscle Mag. 2 caps contain B1 20mg, B2 20mg, B6 10mg, magesium 20mg, manganese 2mg.       UNABLE TO FIND 3 tablets 3 times daily MEDICATION NAME: calcium D-Glucarate   3 caps contain 180mg of elemental calcium.       UNABLE TO FIND 2 tablets 3 times daily MEDICATION NAME: Digestzymes        UNABLE TO FIND 1 tablet daily MEDICATION NAME: Pure Encapsulations       VENTOLIN  (90 BASE) MCG/ACT Inhaler INHALE 2 PUFFS INTO THE LUNGS EVERY 4 HOURS AS NEEDED FOR SHORTNESS OF BREATH OR DIFFICULT BREATHING OR WHEEZING 18 g 3     celecoxib (CELEBREX) 200 MG capsule Take 1 capsule (200 mg) by mouth 2 times daily 60 capsule 3     diazepam (VALIUM) 5 MG tablet Take 1 tablet (5 mg) by mouth 3 times daily as needed for muscle spasms 90 tablet 1     diclofenac (VOLTAREN) 1 % GEL topical gel Apply affected area two times daily PRN using enclosed dosing card. 100 g 3     lidocaine (XYLOCAINE) 5 % ointment Apply quarter size amount to chest and back up to 3 times daily as needed for pain. 350 g 1     methocarbamol (ROBAXIN) 750 MG tablet Take 1 tablet (750 mg) by mouth 4 times daily . Ok to take a 5th Robaxin on very severe days. 360 tablet 1     She is back up to lasix 40 mg  Currently having a hard time getting the calcium in      Allergies  Allergies   Allergen  Reactions     Baclofen Other (See Comments) and Unknown     Other reaction(s): Edema, chest pain, seizures.      No Clinical Screening - See Comments Shortness Of Breath, Palpitations, Anaphylaxis, Itching, Swelling, Difficulty breathing and Rash     Sukhjinder wipes- oral allergy -  July 2015: throat tightness from a Chinese herbal medicine Guillen Raciel Barry Tran     Serotonin Anxiety, Other (See Comments) and Swelling     Seizures      Tree Nuts [Nuts] Shortness Of Breath     Amitriptyline Hcl Swelling     Birch Trees      Potatoes, carrots, cherries, celery, apple, pears, plums, peaches, parsnip, kiwi, hazelnuts, and apricots,      Blue Dyes Itching     Headaches       Cymbalta Other (See Comments)     Flushing, tremor/muscle twitching and edema     Gabapentin Other (See Comments)     edema  Systemic edema, weaned off from Feb to March per Dr. Dowd.    edema     Grass      Mugwort [Artemisia Vulgaris]      Various spices     Ragweeds      Melons, bananas, cucumbers, zucchini.     Topamax      Nortriptyline Itching, Visual Disturbance, Swelling, GI Disturbance, Anxiety, Other (See Comments) and Nausea     Other reaction(s): Swelling       Family History  No thyroid  Family History   Problem Relation Age of Onset     Allergies Mother      Arthritis Mother      CANCER Mother      uterine/uterine     Hypertension Mother      on HCTZ     Hyperlipidemia Mother      CEREBROVASCULAR DISEASE Mother      s/p brain surgery     Breast Cancer Mother      Depression Mother      Anxiety Disorder Mother      Anesthesia Reaction Mother      Asthma Mother      Skin Cancer Mother      HEART DISEASE Father      DIABETES Father      Coronary Artery Disease Father      Hypertension Father      Hyperlipidemia Father      CEREBROVASCULAR DISEASE Father      Multiple strokes     Obesity Father      DIABETES Brother      Depression Brother      Hypertension Brother      CANCER Maternal Grandfather      pancreatic cancer/stomach cancer      Prostate Cancer Maternal Grandfather      Prostrate and Bladder     Other Cancer Maternal Grandfather      Pancreatic 1988, Bladder 1977     CANCER Maternal Grandmother      uterine     Breast Cancer Maternal Grandmother      Skin Cancer Maternal Grandmother      CANCER Brother 57     pancreatic cancer     DIABETES Brother      Breast Cancer Cousin      Grand mothers sister     Other Cancer Brother      Stage 3 Pancreatic 2-2015     Depression Brother      Asthma Brother      Obesity Brother      Anesthesia Reaction Other      H/a, itching, drug interactions     Asthma Other      CANCER Brother      Pancreatic      Thyroid Disease No family hx of        Social History  Social History   Substance Use Topics     Smoking status: Never Smoker     Smokeless tobacco: Never Used      Comment: no 2nd hand smoke exposure     Alcohol use No      Comment: rare     RN.  ;  - still working.     Physical Exam  /85  Pulse 96  There is no height or weight on file to calculate BMI.  GENERAL :   middle aged woman.    SKIN: Normal color; Alopecia/ scarf on head  EYES: PER,No scleral icterus  NECK no masses. I have performed real time neck US. There are no abnormal masses  LUNGS: clear bilaterally  CARDIAC: RRR S1 S2  NEURO: awake, alert, responds appropriately to questions.  Moves all extremities;    DATA REVIEW    ENDO THYROID LABS-[facility] Latest Ref Rng & Units 5/21/2018 2/16/2018   TSH 0.40 - 4.00 mU/L <0.01 (L) 0.01 (L)   T4 FREE 0.76 - 1.46 ng/dL 1.64 (H) 1.14     ENDO THYROID LABS-[facility] Latest Ref Rng & Units 11/2/2017 7/12/2017   TSH 0.40 - 4.00 mU/L 0.22 (L) 0.02 (L)   T4 FREE 0.76 - 1.46 ng/dL 1.30 1.54 (H)     ENDO CALCIUM LABS-[facility] Latest Ref Rng & Units 5/21/2018   CALCIUM 8.5 - 10.1 mg/dL 8.6   CALCIUM IONIZED 4.4 - 5.2 mg/dL    CALCIUM IONIZED WHOLE BLOOD 4.4 - 5.2 mg/dL    PHOSPHOROUS 2.5 - 4.5 mg/dL 4.0   MAGNESIUM 1.6 - 2.3 mg/dL    ALBUMIN 3.4 - 5.0 g/dL 3.9   BUN 7 - 30 mg/dL 15   CREATININE 0.52 - 1.04 mg/dL  0.98   PARATHYROID HORMONE INTACT 12 - 72 pg/mL    ALKPHOS 40 - 150 U/L 99   25 OH VIT D2 ug/L    25 OH VIT D3 ug/L    25 OH VIT D TOTAL 20 - 75 ug/L    VITAMIN D DEFICIENCY SCREENING 20 - 75 ug/L    PROTEIN, TOTAL 6.8 - 8.8 g/dL 7.2     ENDO CALCIUM LABS-[facility] Latest Ref Rng & Units 2/23/2018   CALCIUM 8.5 - 10.1 mg/dL 8.4 (L)   CALCIUM IONIZED 4.4 - 5.2 mg/dL    CALCIUM IONIZED WHOLE BLOOD 4.4 - 5.2 mg/dL    PHOSPHOROUS 2.5 - 4.5 mg/dL    MAGNESIUM 1.6 - 2.3 mg/dL    ALBUMIN 3.4 - 5.0 g/dL    BUN 7 - 30 mg/dL 12   CREATININE 0.52 - 1.04 mg/dL 0.78   PARATHYROID HORMONE INTACT 12 - 72 pg/mL    ALKPHOS 40 - 150 U/L    25 OH VIT D2 ug/L    25 OH VIT D3 ug/L    25 OH VIT D TOTAL 20 - 75 ug/L    VITAMIN D DEFICIENCY SCREENING 20 - 75 ug/L    PROTEIN, TOTAL 6.8 - 8.8 g/dL      ENDO CALCIUM LABS-[facility] Latest Ref Rng & Units 2/16/2018   CALCIUM 8.5 - 10.1 mg/dL 8.4 (L)   CALCIUM IONIZED 4.4 - 5.2 mg/dL    CALCIUM IONIZED WHOLE BLOOD 4.4 - 5.2 mg/dL    PHOSPHOROUS 2.5 - 4.5 mg/dL    MAGNESIUM 1.6 - 2.3 mg/dL    ALBUMIN 3.4 - 5.0 g/dL 3.0 (L)   BUN 7 - 30 mg/dL 12   CREATININE 0.52 - 1.04 mg/dL 0.86   PARATHYROID HORMONE INTACT 12 - 72 pg/mL    ALKPHOS 40 - 150 U/L 70   25 OH VIT D2 ug/L    25 OH VIT D3 ug/L    25 OH VIT D TOTAL 20 - 75 ug/L    VITAMIN D DEFICIENCY SCREENING 20 - 75 ug/L    PROTEIN, TOTAL 6.8 - 8.8 g/dL 5.8 (L)     EXAMINATION: Ultrasound soft tissue neck on, 11/2/2017 5:37 PM      COMPARISON: Soft tissue neck on 4/26/2017     HISTORY:  Thyroid cancer (H) [C73 (ICD-10-CM)]; Postsurgical  hypothyroidism [E89.0 (ICD-10-CM)]; Papillary carcinoma, follicular  variant (H) [C73 (ICD-10-CM)]; Postsurgical hypoparathyroidism [E89.2  (ICD-10-CM)]       FINDINGS:     Lymph nodes are measured bilaterally with measurements given in  craniocaudal, transverse and AP dimensions as follows:     Right:  Level 1: No evidence of lymphadenopathy  Level 2: No evidence of lymphadenopathy  Level 3: No evidence of  lymphadenopathy  Level 4: No evidence of lymphadenopathy  Level 5: No evidence of lymphadenopathy  Level 6: No evidence of lymphadenopathy     Left:  Level 1: No evidence of lymphadenopathy  Level 2: No evidence of lymphadenopathy  Level 3: No evidence of lymphadenopathy  Level 4: No evidence of lymphadenopathy  Level 5: No evidence of lymphadenopathy  Level 6: No evidence of lymphadenopathy         IMPRESSION:No sonographic evidence of significantly enlarged lymph  nodes in the neck.     I have personally reviewed the examination and initial interpretation  and I agree with the findings.     HAJA ELDER MD    ENDO CALCIUM LABS-[facility] Latest Ref Rng & Units 11/10/2017   CALCIUM 8.5 - 10.1 mg/dL 8.0 (L)   CALCIUM IONIZED 4.4 - 5.2 mg/dL    CALCIUM IONIZED WHOLE BLOOD 4.4 - 5.2 mg/dL    PHOSPHOROUS 2.5 - 4.5 mg/dL    MAGNESIUM 1.6 - 2.3 mg/dL    ALBUMIN 3.4 - 5.0 g/dL 2.6 (L)   BUN 7 - 30 mg/dL 16   CREATININE 0.52 - 1.04 mg/dL 0.90   PARATHYROID HORMONE INTACT 12 - 72 pg/mL    ALKPHOS 40 - 150 U/L 82   25 OH VIT D2 ug/L    25 OH VIT D3 ug/L    25 OH VIT D TOTAL 20 - 75 ug/L    VITAMIN D DEFICIENCY SCREENING 20 - 75 ug/L    PROTEIN, TOTAL 6.8 - 8.8 g/dL 6.0 (L)     ENDO CALCIUM LABS-[facility] Latest Ref Rng & Units 11/6/2017   CALCIUM 8.5 - 10.1 mg/dL 7.9 (L)   CALCIUM IONIZED 4.4 - 5.2 mg/dL    CALCIUM IONIZED WHOLE BLOOD 4.4 - 5.2 mg/dL    PHOSPHOROUS 2.5 - 4.5 mg/dL    MAGNESIUM 1.6 - 2.3 mg/dL    ALBUMIN 3.4 - 5.0 g/dL 2.7 (L)   BUN 7 - 30 mg/dL 17   CREATININE 0.52 - 1.04 mg/dL 0.73   PARATHYROID HORMONE INTACT 12 - 72 pg/mL    ALKPHOS 40 - 150 U/L 52   25 OH VIT D2 ug/L    25 OH VIT D3 ug/L    25 OH VIT D TOTAL 20 - 75 ug/L    VITAMIN D DEFICIENCY SCREENING 20 - 75 ug/L    PROTEIN, TOTAL 6.8 - 8.8 g/dL 5.2 (L)     ENDO CALCIUM LABS-[facility] Latest Ref Rng & Units 11/3/2017   CALCIUM 8.5 - 10.1 mg/dL 8.6   CALCIUM IONIZED 4.4 - 5.2 mg/dL    CALCIUM IONIZED WHOLE BLOOD 4.4 - 5.2 mg/dL    PHOSPHOROUS 2.5 - 4.5 mg/dL     MAGNESIUM 1.6 - 2.3 mg/dL    ALBUMIN 3.4 - 5.0 g/dL 3.3 (L)   BUN 7 - 30 mg/dL 26   CREATININE 0.52 - 1.04 mg/dL 0.82   PARATHYROID HORMONE INTACT 12 - 72 pg/mL    ALKPHOS 40 - 150 U/L 75   25 OH VIT D2 ug/L    25 OH VIT D3 ug/L    25 OH VIT D TOTAL 20 - 75 ug/L    VITAMIN D DEFICIENCY SCREENING 20 - 75 ug/L    PROTEIN, TOTAL 6.8 - 8.8 g/dL 6.2 (L)     ENDO CALCIUM LABS-[facility] Latest Ref Rng & Units 11/2/2017   CALCIUM 8.5 - 10.1 mg/dL 8.8   CALCIUM IONIZED 4.4 - 5.2 mg/dL    CALCIUM IONIZED WHOLE BLOOD 4.4 - 5.2 mg/dL    PHOSPHOROUS 2.5 - 4.5 mg/dL 3.6   MAGNESIUM 1.6 - 2.3 mg/dL    ALBUMIN 3.4 - 5.0 g/dL 3.6   BUN 7 - 30 mg/dL 23   CREATININE 0.52 - 1.04 mg/dL 0.88   PARATHYROID HORMONE INTACT 12 - 72 pg/mL    ALKPHOS 40 - 150 U/L 61   25 OH VIT D2 ug/L <5   25 OH VIT D3 ug/L 48   25 OH VIT D TOTAL 20 - 75 ug/L <53   VITAMIN D DEFICIENCY SCREENING 20 - 75 ug/L    PROTEIN, TOTAL 6.8 - 8.8 g/dL 6.6 (L)       Again, thank you for allowing me to participate in the care of your patient.      Sincerely,    Avelina Castro MD

## 2018-05-24 DIAGNOSIS — R07.89 CHEST WALL PAIN: ICD-10-CM

## 2018-05-24 DIAGNOSIS — M79.18 MYOFASCIAL PAIN: ICD-10-CM

## 2018-05-24 NOTE — TELEPHONE ENCOUNTER
Received call from patient as she is having some trouble getting refills of several medications. She is requesting refills of celebrex, lidocaine ointment, voltaren gel, robaxin, and diazepam. She thinks that oncology wrote some of them and also someone from the hospital that was discharging her and so the faxes from the pharmacy for refills arent getting to the right spot.      Last refill celebrex: 1/22/2018  Last refill lidocaine: 3/14/2018  Last refill voltaren: 1/24/2018  Last refill robaxin: 1/24/2018 (marked historical in the chart)  Last refill diazepam: 4/11/2018  Last office visit: 2/28/2018  Scheduled for follow up 6/6/2018    Will route request to MD for review.     Reviewed [place]  Report.

## 2018-05-25 RX ORDER — METHOCARBAMOL 750 MG/1
750 TABLET, FILM COATED ORAL 4 TIMES DAILY
Qty: 360 TABLET | Refills: 1 | Status: SHIPPED | OUTPATIENT
Start: 2018-05-25 | End: 2018-11-20

## 2018-05-25 RX ORDER — CELECOXIB 200 MG/1
200 CAPSULE ORAL 2 TIMES DAILY
Qty: 60 CAPSULE | Refills: 3 | Status: SHIPPED | OUTPATIENT
Start: 2018-05-25 | End: 2018-09-13

## 2018-05-25 RX ORDER — LIDOCAINE 50 MG/G
OINTMENT TOPICAL
Qty: 350 G | Refills: 1 | Status: SHIPPED | OUTPATIENT
Start: 2018-05-25 | End: 2018-11-26

## 2018-05-25 RX ORDER — DIAZEPAM 5 MG
5 TABLET ORAL 3 TIMES DAILY PRN
Qty: 90 TABLET | Refills: 1 | Status: SHIPPED | OUTPATIENT
Start: 2018-05-25 | End: 2018-07-29

## 2018-05-29 ENCOUNTER — ONCOLOGY VISIT (OUTPATIENT)
Dept: ONCOLOGY | Facility: CLINIC | Age: 58
End: 2018-05-29
Attending: INTERNAL MEDICINE
Payer: COMMERCIAL

## 2018-05-29 ENCOUNTER — OFFICE VISIT (OUTPATIENT)
Dept: TRANSPLANT | Facility: CLINIC | Age: 58
End: 2018-05-29
Payer: COMMERCIAL

## 2018-05-29 ENCOUNTER — RECORDS - HEALTHEAST (OUTPATIENT)
Dept: ADMINISTRATIVE | Facility: OTHER | Age: 58
End: 2018-05-29

## 2018-05-29 VITALS
HEART RATE: 110 BPM | WEIGHT: 185.1 LBS | TEMPERATURE: 98.3 F | OXYGEN SATURATION: 95 % | DIASTOLIC BLOOD PRESSURE: 85 MMHG | BODY MASS INDEX: 30.8 KG/M2 | SYSTOLIC BLOOD PRESSURE: 123 MMHG

## 2018-05-29 VITALS
SYSTOLIC BLOOD PRESSURE: 123 MMHG | WEIGHT: 185.1 LBS | TEMPERATURE: 98.3 F | BODY MASS INDEX: 30.8 KG/M2 | DIASTOLIC BLOOD PRESSURE: 85 MMHG | HEART RATE: 110 BPM | OXYGEN SATURATION: 95 %

## 2018-05-29 DIAGNOSIS — C83.33 DIFFUSE LARGE B-CELL LYMPHOMA OF INTRA-ABDOMINAL LYMPH NODES (H): Primary | ICD-10-CM

## 2018-05-29 DIAGNOSIS — D05.10 DUCTAL CARCINOMA IN SITU (DCIS) OF BREAST, UNSPECIFIED LATERALITY: ICD-10-CM

## 2018-05-29 PROCEDURE — 90471 IMMUNIZATION ADMIN: CPT

## 2018-05-29 PROCEDURE — G0463 HOSPITAL OUTPT CLINIC VISIT: HCPCS | Mod: 27

## 2018-05-29 PROCEDURE — 90750 HZV VACC RECOMBINANT IM: CPT | Mod: ZF

## 2018-05-29 PROCEDURE — 99214 OFFICE O/P EST MOD 30 MIN: CPT | Mod: ZP | Performed by: INTERNAL MEDICINE

## 2018-05-29 PROCEDURE — 25000125 ZZHC RX 250: Mod: ZF

## 2018-05-29 PROCEDURE — G0463 HOSPITAL OUTPT CLINIC VISIT: HCPCS | Mod: ZF,25

## 2018-05-29 RX ORDER — TAMOXIFEN CITRATE 20 MG/1
20 TABLET ORAL DAILY
Qty: 90 TABLET | Refills: 3 | Status: SHIPPED | OUTPATIENT
Start: 2018-05-29 | End: 2018-11-26

## 2018-05-29 RX ADMIN — ZOSTER VACCINE RECOMBINANT, ADJUVANTED 0.5 ML: KIT at 17:24

## 2018-05-29 NOTE — MR AVS SNAPSHOT
After Visit Summary   5/29/2018    Tisha Arias    MRN: [id number]           Patient Information     Date Of Birth          1960        Visit Information        Provider Department      5/29/2018 3:30 PM Shahla Crawley MD [facility]        Today's Diagnoses     Ductal carcinoma in situ (DCIS) of breast, unspecified laterality           Follow-ups after your visit        Your next 10 appointments already scheduled     Jun 06, 2018  7:00 AM CDT   (Arrive by 6:45 AM)   Return Visit with Edu Benitez MD   [facility] ([facility])    [address]  [address]  [facility] [address] [phone]            Sep 04, 2018  4:30 PM CDT   [facility] Draw with  [facility] LAB DRAW   [facility] ([facility])    [address]  [address]  [facility] [id number] [phone]            Sep 04, 2018  5:00 PM CDT   RETURN ONC with Garima Sauceda MD   [facility] ([facility])    [address]  [address]  [facility] [id number] [phone]            Nov 26, 2018  4:40 PM CST   (Arrive by 4:25 PM)   RETURN ENDOCRINE with Avelina Castro MD   [facility]ocr[facility]ology ([facility])    [address] [place] [address]  3rd Floor  [facility] [id number] [phone]              Who to contact     If you have questions or need follow up information about today's clinic visit or your schedule please contact [facility] directly at [phone].  Normal or non-critical lab and imaging results will be communicated to you by MyChart, letter or phone within 4 business days after the clinic has received the results. If you do not hear from us within 7 days, please contact the clinic through MyChart or phone. If you have a critical or abnormal lab result, we will notify you by  phone as soon as possible.  Submit refill requests through [employer] or call your pharmacy and they will forward the refill request to us. Please allow 3 business days for your refill to be completed.          Additional Information About Your Visit        [employer] Information     [employer] gives you secure access to your electronic health record. If you see a primary care provider, you can also send messages to your care team and make appointments. If you have questions, please call your primary care clinic.  If you do not have a primary care provider, please call [phone] and they will assist you.        Care EveryWhere ID     This is your Care EveryWhere ID. This could be used by other organizations to access your [place] medical records  [id number]        Your Vitals Were     Pulse Temperature Pulse Oximetry BMI (Body Mass Index)          110 98.3  F (36.8  C) (Oral) 95% 30.8 kg/m2         Blood Pressure from Last 3 Encounters:   05/29/18 123/85   05/29/18 123/85   05/21/18 144/85    Weight from Last 3 Encounters:   05/29/18 84 kg (185 lb 1.6 oz)   05/29/18 84 kg (185 lb 1.6 oz)   02/23/18 89.1 kg (196 lb 8 oz)              Today, you had the following     No orders found for display         Today's Medication Changes          These changes are accurate as of 5/29/18 11:59 PM.  If you have any questions, ask your nurse or doctor.               These medicines have changed or have updated prescriptions.        Dose/Directions    cyclobenzaprine 10 MG tablet   Commonly known as:  FLEXERIL   This may have changed:  when to take this   Used for:  High grade B-cell lymphoma (H)        Dose:  10 mg   Take 1 tablet (10 mg) by mouth 3 times daily as needed   Quantity:  30 tablet   Refills:  0       levothyroxine 112 MCG tablet   Commonly known as:  SYNTHROID/LEVOTHROID   This may have changed:    - how much to take  - additional instructions   Used for:  Postsurgical hypothyroidism, Papillary carcinoma, follicular  variant (H), Postsurgical hypoparathyroidism (H), Thyroid cancer (H)        Mon-Sat: (2 tabs daily) Sunday: 3 tabs   Quantity:  189 tablet   Refills:  3       * ondansetron 8 MG ODT tab   Commonly known as:  ZOFRAN-ODT   This may have changed:  when to take this   Used for:  High grade B-cell lymphoma (H), Nausea and vomiting, intractability of vomiting not specified, unspecified vomiting type        Dose:  8 mg   Take 1 tablet (8 mg) by mouth every 8 hours as needed   Quantity:  30 tablet   Refills:  1       * ondansetron 8 MG ODT tab   Commonly known as:  ZOFRAN-ODT   This may have changed:  Another medication with the same name was changed. Make sure you understand how and when to take each.   Used for:  High grade B-cell lymphoma (H), Nausea and vomiting, intractability of vomiting not specified, unspecified vomiting type, Breast cancer (H)        Take 1 tablet (8 mg) by mouth every 8 hours as needed   Quantity:  30 tablet   Refills:  0       * Notice:  This list has 2 medication(s) that are the same as other medications prescribed for you. Read the directions carefully, and ask your doctor or other care provider to review them with you.         Where to get your medicines      These medications were sent to [id number] - [place], [place] - [address] [address] [place]  [address] [address] [place] [address]     Phone:  [phone]     tamoxifen 20 MG tablet                Primary Care Provider Office Phone # Fax #    Shahla Crawley [phone]       [address] [place] [address]  [facility] [address]        Equal Access to Services     : Hadii aad ku hadasho Soomaali, waaxda luqadaha, qaybta kaalmada adeegyada, waxay sulma martin. So [facility] [phone].    ATENCIÓN: Si habla español, tiene a stiles disposición servicios gratuitos de asistencia lingüística. Llame al [phone].    We comply with applicable federal civil rights laws and [place] laws. We do not  discriminate on the basis of race, color, national origin, age, disability, sex, sexual orientation, or gender identity.            Thank you!     Thank you for choosing [facility]  for your care. Our goal is always to provide you with excellent care. Hearing back from our patients is one way we can continue to improve our services. Please take a few minutes to complete the written survey that you may receive in the mail after your visit with us. Thank you!             Your Updated Medication List - Protect others around you: Learn how to safely use, store and throw away your medicines at www.disposemymeds.org.          This list is accurate as of 5/29/18 11:59 PM.  Always use your most recent med list.                   Brand Name Dispense Instructions for use Diagnosis    ACAI RAMIREZ PO      Take 50 mg by mouth        acetaminophen 325 MG tablet    TYLENOL    100 tablet    Take 2 tablets (650 mg) by mouth every 4 hours as needed for mild pain or fever    High grade B-cell lymphoma (H)       acyclovir 400 MG tablet    ZOVIRAX    30 tablet    Take 1 tablet (400 mg) by mouth 2 times daily    High grade B-cell lymphoma (H)       aluminum chloride 20 % external solution    DRYSOL    60 mL    Apply topically At Bedtime    Rash, Intertrigo       AZMACORT IN      Inhale 2 puffs into the lungs as needed        bisacodyl 5 MG EC tablet     30 tablet    Take 3 tablets (15 mg) by mouth daily as needed for constipation    Constipation, unspecified constipation type       calcitRIOL 0.25 MCG capsule    ROCALTROL    90 capsule    TAKE 2 CAPSULES BY MOUTH EVERY MORNING AND TAKE 1 CAPSULE BY MOUTH EVERY NIGHT AT BEDTIME    Postsurgical hypothyroidism, Papillary carcinoma, follicular variant (H), Metastasis to cervical lymph node (H)       calcium Citrate-vitamin D 500-400 MG-UNIT Chew     60 tablet    Take 1 tablet by mouth 3 times daily        celecoxib 200 MG capsule    celeBREX    60 capsule    Take 1 capsule  (200 mg) by mouth 2 times daily    Chest wall pain       cetirizine 10 MG tablet    ZYRTEC ALLERGY    90 tablet    Take 1 tablet (10 mg) by mouth 3 times daily On hold for lab test.    High grade B-cell lymphoma (H)       COMPOUND CONTAINING CONTROLLED SUBSTANCE - PHARMACY TO MIX COMPOUNDED MEDICATION    CMPD RX    120 g    Apply small amount to affected area two times daily.    Neoplasm related pain       cromolyn 4 % ophthalmic solution    OPTICROM    10 mL    Place 1 drop into both eyes 4 times daily    Idiopathic mast cell activation syndrome (H)       cromolyn sodium 5.2 MG/ACT Aers Inhaler    NASALCROM    1 Bottle    SPRAY ONE SPRAY( 1 ML) IN NOSTRIL DAILY    Mast cell disease, systemic       [employer] GUMMY BE[employer] CHILDREN Chew     120 tablet    Take 5 g by mouth daily (2 gummy= 5 g =1 serving)    High grade B-cell lymphoma (H)       cyclobenzaprine 10 MG tablet    FLEXERIL    30 tablet    Take 1 tablet (10 mg) by mouth 3 times daily as needed    High grade B-cell lymphoma (H)       diazepam 5 MG tablet    VALIUM    90 tablet    Take 1 tablet (5 mg) by mouth 3 times daily as needed for muscle spasms    Chest wall pain       diclofenac 1 % Gel topical gel    VOLTAREN    100 g    Apply affected area two times daily PRN using enclosed dosing card.    Myofascial pain       EPINEPHrine 0.3 MG/0.3ML injection 2-pack    EPIPEN 2-CARIDAD    0.6 mL    Inject 0.3 mLs (0.3 mg) into the muscle once as needed for anaphylaxis        fluticasone 50 MCG/ACT spray    FLONASE    1 Bottle    Spray 1-2 sprays into both nostrils daily    Bacterial sinusitis       * furosemide 20 MG tablet    LASIX    60 tablet    Take 1 tablet (20 mg) by mouth 2 times daily    Localized edema       * furosemide 20 MG tablet    LASIX    60 tablet    Take 1 tablet (20 mg) by mouth 2 times daily    Bilateral lower extremity edema       hydrochlorothiazide 12.5 MG capsule    MICROZIDE    30 capsule    Take 1 capsule (12.5 mg) by mouth daily     Hypocalcemia       levofloxacin 750 MG tablet    LEVAQUIN    10 tablet    Take 1 tablet (750 mg) by mouth daily    Bacterial sinusitis       levothyroxine 112 MCG tablet    SYNTHROID/LEVOTHROID    189 tablet    Mon-Sat: (2 tabs daily) Sunday: 3 tabs    Postsurgical hypothyroidism, Papillary carcinoma, follicular variant (H), Postsurgical hypoparathyroidism (H), Thyroid cancer (H)       lidocaine 5 % ointment    XYLOCAINE    350 g    Apply quarter size amount to chest and back up to 3 times daily as needed for pain.    Chest wall pain       lidocaine visc 2% 2.5mL/5mL & maalox/mylanta w/ simeth 2.5mL/5mL & diphenhydrAMINE 5mg/5mL Susp suspension    [facility] Mouthwash [facility]    360 mL    Swish and spit 10 mLs in mouth every 6 hours as needed for mouth sores    Stomatitis and mucositis, High grade B-cell lymphoma (H)       * lidocaine-prilocaine cream    EMLA    30 g    Apply topically as needed (port access pain.)    Neoplasm related pain, Chest wall pain       * lidocaine-prilocaine cream    EMLA    60 g    Apply topically as needed for moderate pain    High grade B-cell lymphoma (H)       magic mouthwash suspension (diphenhydrAMINE, lidocaine, aluminum-magnesium & simethicone) Susp compounding kit     237 mL    Swish and spit 5-10 mLs in mouth every 6 hours as needed for mouth sores    Stomatitis and mucositis, High grade B-cell lymphoma (H)       methocarbamol 750 MG tablet    ROBAXIN    360 tablet    Take 1 tablet (750 mg) by mouth 4 times daily . Ok to take a 5th Robaxin on very severe days.    Myofascial pain       montelukast 10 MG tablet    SINGULAIR    60 tablet    Take 2 tablets (20 mg) by mouth 2 times daily    Idiopathic mast cell activation syndrome (H)       morphine 30 MG 12 hr tablet    MS CONTIN    120 tablet    Take 1 tablet (30 mg) by mouth every 8 hours . Take an addition pill at night such that your evening dose is 60mg    Neoplasm related pain       * ondansetron 8 MG ODT tab    ZOFRAN-ODT    30  tablet    Take 1 tablet (8 mg) by mouth every 8 hours as needed    High grade B-cell lymphoma (H), Nausea and vomiting, intractability of vomiting not specified, unspecified vomiting type       * ondansetron 8 MG ODT tab    ZOFRAN-ODT    30 tablet    Take 1 tablet (8 mg) by mouth every 8 hours as needed    High grade B-cell lymphoma (H), Nausea and vomiting, intractability of vomiting not specified, unspecified vomiting type, Breast cancer (H)       * order for DME     1 Units    Compression stockings, medium grade, please measure and fit. Please dispense a pair.    Peripheral edema       * order for DME     2 Units    Equipment being ordered: compression stockings. 20-30 mm or 30 - 40 mm as patient can tolerate. Physical therapy to determine if they should be above or below the knee.    Venous stasis       * order for DME     2 Device    Equipment being ordered: compression bra    Malignant neoplasm of right female breast (H)       order for DME     1 Units    Compression socks - knee high 20-30 mmHg zippered if possible    Lower extremity edema       oxyCODONE IR 30 MG tablet    ROXICODONE    180 tablet    Take 1 tablet (30 mg) by mouth every 4 hours as needed for moderate to severe pain    High grade B-cell lymphoma (H)       polyethylene glycol powder    MIRALAX    510 g    Take 17 g (1 capful) by mouth daily    Acute constipation       potassium chloride SA 20 MEQ CR tablet    KLOR-CON    60 tablet    Take 1 tablet (20 mEq) by mouth 2 times daily    Hypokalemia       PROBIOTIC DAILY PO      Take 1 capsule by mouth daily Lacto acid bifidobacterium    Breast cancer, unspecified laterality, Thyroid cancer (H), Chronic arthralgias of knees and hips, unspecified laterality       prochlorperazine 10 MG tablet    COMPAZINE     Take 10 mg by mouth as needed        ranitidine 75 MG tablet    ZANTAC    90 tablet    Take 1 tablet (75 mg) by mouth 3 times daily    Mast cell disease, systemic       senna-docusate 8.6-50 MG  per tablet    SENOKOT-S;PERICOLACE    60 tablet    Take 1-2 tablets by mouth 2 times daily as needed for constipation    Acute constipation       SUMAtriptan 50 MG tablet    IMITREX     Take 50 mg by mouth as needed        tamoxifen 20 MG tablet    NOLVADEX    90 tablet    Take 1 tablet (20 mg) by mouth daily    Ductal carcinoma in situ (DCIS) of breast, unspecified laterality       triamcinolone 0.025 % ointment    KENALOG     Apply topically as needed        UNABLE TO FIND      3 tablets 3 times daily MEDICATION NAME: calcium D-Glucarate  3 caps contain 180mg of elemental calcium.        UNABLE TO FIND      Take 2 capsules by mouth 3 times daily Muscle Mag. 2 caps contain B1 20mg, B2 20mg, B6 10mg, magesium 20mg, manganese 2mg.        UNABLE TO FIND      2 tablets 3 times daily MEDICATION NAME: Digestzymes    Thyroid cancer (H), Postsurgical hypothyroidism, Postsurgical hypoparathyroidism (H)       UNABLE TO FIND      1 tablet daily MEDICATION NAME: Pure Encapsulations    Thyroid cancer (H), Postsurgical hypothyroidism, Postsurgical hypoparathyroidism (H)       VENTOLIN  (90 Base) MCG/ACT Inhaler   Generic drug:  albuterol     18 g    INHALE 2 PUFFS INTO THE LUNGS EVERY 4 HOURS AS NEEDED FOR SHORTNESS OF BREATH OR DIFFICULT BREATHING OR WHEEZING    Mild intermittent asthma without complication       vitamin D3 2000 units Caps     60 capsule    Take 5,000 Units by mouth daily Takes 2 tabs daily    Thyroid cancer (H), Postsurgical hypothyroidism, Papillary carcinoma, follicular variant (H), Postsurgical hypoparathyroidism (H)       * Notice:  This list has 9 medication(s) that are the same as other medications prescribed for you. Read the directions carefully, and ask your doctor or other care provider to review them with you.

## 2018-05-29 NOTE — MR AVS SNAPSHOT
After Visit Summary   5/29/2018    Tisha Arias    MRN: [id number]           Patient Information     Date Of Birth          1960        Visit Information        Provider Department      5/29/2018 4:00 PM Garima Sauceda MD [facility] Blood and Marrow Transplant            [facility] ([facility])  [address] [place] [address]  Phone: [phone]  Clinic Hours:   Monday-Thursday:7am to 7pm   Friday: 7am to 5pm   Weekends and holidays:    8am to noon (in general)  If your fever is 100.5  or greater,   call the clinic.  After hours call the   hospital at [phone] or   [phone]. Ask for the BMT   fellow on-call            Follow-ups after your visit        Your next 10 appointments already scheduled     Jun 06, 2018  7:00 AM CDT   (Arrive by 6:45 AM)   Return Visit with Edu Benitez MD   [facility] ([facility])    [address] [place] [address]  [address] [place] [address] [id number] [phone]            Sep 04, 2018  4:30 PM CDT   [facility] Draw with  [facility] DRAW   [facility] Draw ([facility])    [address] [place] [address]  [address] [place] [address] [id number] [phone]            Sep 04, 2018  5:00 PM CDT   RETURN ONC with Garima Sauceda MD   [facility] Blood and Marrow Transplant ([facility])    [address] [place] [address]  [address] [place] [address] [id number] [phone]            Nov 26, 2018  4:40 PM CST   (Arrive by 4:25 PM)   RETURN ENDOCRINE with Avelina Castro MD   [facility] Endocrinology ([facility])    [address] [place] [address]  3rd Floor  [facility] [id number] [phone]              Who to contact     If you have questions or need follow up information about today's clinic visit or your schedule please contact [facility]PL[facility] directly at [phone].  Normal or non-critical lab  and imaging results will be communicated to you by Legend[employer] Pictureshart, letter or phone within 4 business days after the clinic has received the results. If you do not hear from us within 7 days, please contact the clinic through Trivop or phone. If you have a critical or abnormal lab result, we will notify you by phone as soon as possible.  Submit refill requests through Trivop or call your pharmacy and they will forward the refill request to us. Please allow 3 business days for your refill to be completed.          Additional Information About Your Visit        Legendary PicturesharRebit Information     Trivop gives you secure access to your electronic health record. If you see a primary care provider, you can also send messages to your care team and make appointments. If you have questions, please call your primary care clinic.  If you do not have a primary care provider, please call [phone] and they will assist you.        Care EveryWhere ID     This is your Care EveryWhere ID. This could be used by other organizations to access your [facility] medical records  [id number]        Your Vitals Were     Pulse Temperature Pulse Oximetry BMI (Body Mass Index)          110 98.3  F (36.8  C) (Oral) 95% 30.8 kg/m2         Blood Pressure from Last 3 Encounters:   05/29/18 123/85   05/29/18 123/85   05/21/18 144/85    Weight from Last 3 Encounters:   05/29/18 84 kg (185 lb 1.6 oz)   05/29/18 84 kg (185 lb 1.6 oz)   02/23/18 89.1 kg (196 lb 8 oz)              Today, you had the following     No orders found for display         Today's Medication Changes          These changes are accurate as of 5/29/18  5:06 PM.  If you have any questions, ask your nurse or doctor.               These medicines have changed or have updated prescriptions.        Dose/Directions    cyclobenzaprine 10 MG tablet   Commonly known as:  FLEXERIL   This may have changed:  when to take this   Used for:  High grade B-cell lymphoma (H)        Dose:  10 mg   Take 1 tablet  (10 mg) by mouth 3 times daily as needed   Quantity:  30 tablet   Refills:  0       levothyroxine 112 MCG tablet   Commonly known as:  SYNTHROID/LEVOTHROID   This may have changed:    - how much to take  - additional instructions   Used for:  Postsurgical hypothyroidism, Papillary carcinoma, follicular variant (H), Postsurgical hypoparathyroidism (H), Thyroid cancer (H)        Mon-Sat: (2 tabs daily) Sunday: 3 tabs   Quantity:  189 tablet   Refills:  3       * ondansetron 8 MG ODT tab   Commonly known as:  ZOFRAN-ODT   This may have changed:  when to take this   Used for:  High grade B-cell lymphoma (H), Nausea and vomiting, intractability of vomiting not specified, unspecified vomiting type        Dose:  8 mg   Take 1 tablet (8 mg) by mouth every 8 hours as needed   Quantity:  30 tablet   Refills:  1       * ondansetron 8 MG ODT tab   Commonly known as:  ZOFRAN-ODT   This may have changed:  Another medication with the same name was changed. Make sure you understand how and when to take each.   Used for:  High grade B-cell lymphoma (H), Nausea and vomiting, intractability of vomiting not specified, unspecified vomiting type, Breast cancer (H)        Take 1 tablet (8 mg) by mouth every 8 hours as needed   Quantity:  30 tablet   Refills:  0       * Notice:  This list has 2 medication(s) that are the same as other medications prescribed for you. Read the directions carefully, and ask your doctor or other care provider to review them with you.         Where to get your medicines      These medications were sent to [id number] - [place], [place] - [address] [address] [place]  [address] [address] [place] [address]     Phone:  [phone]     tamoxifen 20 MG tablet                Recent Review Flowsheet Data     BMT Recent Results Latest Ref Rng & Units 2/5/2018 2/8/2018 2/16/2018 2/23/2018 2/27/2018 4/19/2018 5/21/2018    WBC 4.0 - 11.0 10e9/L 4.6 3.8(L) 3.2(L) 4.4 - 3.3(L) 3.5(L)    Hemoglobin 11.7 - 15.7 g/dL 9.4(L)  9.6(L) 10.0(L) 10.8(L) - 12.8 12.9    Platelet Count 150 - 450 10e9/L 136(L) 224 166 182 - 219 187    Neutrophils (Absolute) 1.6 - 8.3 10e9/L 3.9 2.6 2.0 2.9 - 1.8 2.0    INR 0.86 - 1.14 - - - - - - -    Sodium 133 - 144 mmol/L - - 141 140 - - 138    Potassium 3.4 - 5.3 mmol/L - - 3.6 3.9 - - 3.4    Chloride 94 - 109 mmol/L - - 105 105 - - 100    Glucose 70 - 99 mg/dL - - 137(H) 144(H) 102(H) - 106(H)    Urea Nitrogen 7 - 30 mg/dL - - 12 12 - - 15    Creatinine 0.52 - 1.04 mg/dL - - 0.86 0.78 - - 0.98    Calcium (Total) 8.5 - 10.1 mg/dL - - 8.4(L) 8.4(L) - - 8.6    Protein (Total) 6.8 - 8.8 g/dL - - 5.8(L) - - - 7.2    Albumin 3.4 - 5.0 g/dL - - 3.0(L) - - - 3.9    Alkaline Phosphatase 40 - 150 U/L - - 70 - - - 99    AST 0 - 45 U/L - - 19 - - - 24    ALT 0 - 50 U/L - - 17 - - - 27    MCV 78 - 100 fl 101(H) 103(H) 104(H) 104(H) - 100 94               Primary Care Provider Office Phone # Fax #    Shahla Raul Crawley [phone]       [address] [place] [address]        Equal Access to Services     [facility] SERA AH: Kevin Venegas, girish ge, ranjan south [facility] [phone].    ATENCIÓN: Si habla español, tiene a stiles disposición servicios gratuitos de asistencia lingüística. Oneil prince [phone].    We comply with applicable federal civil rights laws and [place] laws. We do not discriminate on the basis of race, color, national origin, age, disability, sex, sexual orientation, or gender identity.            Thank you!     Thank you for choosing [facility] BLOOD [facility] MARROW TRANSPLANT  for your care. Our goal is always to provide you with excellent care. Hearing back from our patients is one way we can continue to improve our services. Please take a few minutes to complete the written survey that you may receive in the mail after your visit with us. Thank you!             Your Updated Medication List - Protect  others around you: Learn how to safely use, store and throw away your medicines at www.disposemymeds.org.          This list is accurate as of 5/29/18  5:06 PM.  Always use your most recent med list.                   Brand Name Dispense Instructions for use Diagnosis    BRIANA RAMIREZ PO      Take 50 mg by mouth        acetaminophen 325 MG tablet    TYLENOL    100 tablet    Take 2 tablets (650 mg) by mouth every 4 hours as needed for mild pain or fever    High grade B-cell lymphoma (H)       acyclovir 400 MG tablet    ZOVIRAX    30 tablet    Take 1 tablet (400 mg) by mouth 2 times daily    High grade B-cell lymphoma (H)       aluminum chloride 20 % external solution    DRYSOL    60 mL    Apply topically At Bedtime    Rash, Intertrigo       AZMACORT IN      Inhale 2 puffs into the lungs as needed        bisacodyl 5 MG EC tablet     30 tablet    Take 3 tablets (15 mg) by mouth daily as needed for constipation    Constipation, unspecified constipation type       calcitRIOL 0.25 MCG capsule    ROCALTROL    90 capsule    TAKE 2 CAPSULES BY MOUTH EVERY MORNING AND TAKE 1 CAPSULE BY MOUTH EVERY NIGHT AT BEDTIME    Postsurgical hypothyroidism, Papillary carcinoma, follicular variant (H), Metastasis to cervical lymph node (H)       calcium Citrate-vitamin D 500-400 MG-UNIT Chew     60 tablet    Take 1 tablet by mouth 3 times daily        celecoxib 200 MG capsule    celeBREX    60 capsule    Take 1 capsule (200 mg) by mouth 2 times daily    Chest wall pain       cetirizine 10 MG tablet    ZYRTEC ALLERGY    90 tablet    Take 1 tablet (10 mg) by mouth 3 times daily On hold for lab test.    High grade B-cell lymphoma (H)       COMPOUND CONTAINING CONTROLLED SUBSTANCE - PHARMACY TO MIX COMPOUNDED MEDICATION    CMPD RX    120 g    Apply small amount to affected area two times daily.    Neoplasm related pain       cromolyn 4 % ophthalmic solution    OPTICROM    10 mL    Place 1 drop into both eyes 4 times daily    Idiopathic mast cell  activation syndrome (H)       cromolyn sodium 5.2 MG/ACT Aers Inhaler    NASALCROM    1 Bottle    SPRAY ONE SPRAY( 1 ML) IN NOSTRIL DAILY    Mast cell disease, systemic       [employer] GUMMY BEARS CHILDREN Chew     120 tablet    Take 5 g by mouth daily (2 gummy= 5 g =1 serving)    High grade B-cell lymphoma (H)       cyclobenzaprine 10 MG tablet    FLEXERIL    30 tablet    Take 1 tablet (10 mg) by mouth 3 times daily as needed    High grade B-cell lymphoma (H)       diazepam 5 MG tablet    VALIUM    90 tablet    Take 1 tablet (5 mg) by mouth 3 times daily as needed for muscle spasms    Chest wall pain       diclofenac 1 % Gel topical gel    VOLTAREN    100 g    Apply affected area two times daily PRN using enclosed dosing card.    Myofascial pain       EPINEPHrine 0.3 MG/0.3ML injection 2-pack    EPIPEN 2-CARIDAD    0.6 mL    Inject 0.3 mLs (0.3 mg) into the muscle once as needed for anaphylaxis        fluticasone 50 MCG/ACT spray    FLONASE    1 Bottle    Spray 1-2 sprays into both nostrils daily    Bacterial sinusitis       * furosemide 20 MG tablet    LASIX    60 tablet    Take 1 tablet (20 mg) by mouth 2 times daily    Localized edema       * furosemide 20 MG tablet    LASIX    60 tablet    Take 1 tablet (20 mg) by mouth 2 times daily    Bilateral lower extremity edema       hydrochlorothiazide 12.5 MG capsule    MICROZIDE    30 capsule    Take 1 capsule (12.5 mg) by mouth daily    Hypocalcemia       levofloxacin 750 MG tablet    LEVAQUIN    10 tablet    Take 1 tablet (750 mg) by mouth daily    Bacterial sinusitis       levothyroxine 112 MCG tablet    SYNTHROID/LEVOTHROID    189 tablet    Mon-Sat: (2 tabs daily) Sunday: 3 tabs    Postsurgical hypothyroidism, Papillary carcinoma, follicular variant (H), Postsurgical hypoparathyroidism (H), Thyroid cancer (H)       lidocaine 5 % ointment    XYLOCAINE    350 g    Apply quarter size amount to chest and back up to 3 times daily as needed for pain.    Chest wall pain        lidocaine visc 2% 2.5mL/5mL & maalox/mylanta w/ simeth 2.5mL/5mL & diphenhydrAMINE 5mg/5mL Susp suspension    MAGIC Mouthwash HOSPITAL    360 mL    Swish and spit 10 mLs in mouth every 6 hours as needed for mouth sores    Stomatitis and mucositis, High grade B-cell lymphoma (H)       * lidocaine-prilocaine cream    EMLA    30 g    Apply topically as needed (port access pain.)    Neoplasm related pain, Chest wall pain       * lidocaine-prilocaine cream    EMLA    60 g    Apply topically as needed for moderate pain    High grade B-cell lymphoma (H)       magic mouthwash suspension (diphenhydrAMINE, lidocaine, aluminum-magnesium & simethicone) Susp compounding kit     237 mL    Swish and spit 5-10 mLs in mouth every 6 hours as needed for mouth sores    Stomatitis and mucositis, High grade B-cell lymphoma (H)       methocarbamol 750 MG tablet    ROBAXIN    360 tablet    Take 1 tablet (750 mg) by mouth 4 times daily . Ok to take a 5th Robaxin on very severe days.    Myofascial pain       montelukast 10 MG tablet    SINGULAIR    60 tablet    Take 2 tablets (20 mg) by mouth 2 times daily    Idiopathic mast cell activation syndrome (H)       morphine 30 MG 12 hr tablet    MS CONTIN    120 tablet    Take 1 tablet (30 mg) by mouth every 8 hours . Take an addition pill at night such that your evening dose is 60mg    Neoplasm related pain       * ondansetron 8 MG ODT tab    ZOFRAN-ODT    30 tablet    Take 1 tablet (8 mg) by mouth every 8 hours as needed    High grade B-cell lymphoma (H), Nausea and vomiting, intractability of vomiting not specified, unspecified vomiting type       * ondansetron 8 MG ODT tab    ZOFRAN-ODT    30 tablet    Take 1 tablet (8 mg) by mouth every 8 hours as needed    High grade B-cell lymphoma (H), Nausea and vomiting, intractability of vomiting not specified, unspecified vomiting type, Breast cancer (H)       * order for DME     1 Units    Compression stockings, medium grade, please measure and  fit. Please dispense a pair.    Peripheral edema       * order for DME     2 Units    Equipment being ordered: compression stockings. 20-30 mm or 30 - 40 mm as patient can tolerate. Physical therapy to determine if they should be above or below the knee.    Venous stasis       * order for DME     2 Device    Equipment being ordered: compression bra    Malignant neoplasm of right female breast (H)       order for DME     1 Units    Compression socks - knee high 20-30 mmHg zippered if possible    Lower extremity edema       oxyCODONE IR 30 MG tablet    ROXICODONE    180 tablet    Take 1 tablet (30 mg) by mouth every 4 hours as needed for moderate to severe pain    High grade B-cell lymphoma (H)       polyethylene glycol powder    MIRALAX    510 g    Take 17 g (1 capful) by mouth daily    Acute constipation       potassium chloride SA 20 MEQ CR tablet    KLOR-CON    60 tablet    Take 1 tablet (20 mEq) by mouth 2 times daily    Hypokalemia       PROBIOTIC DAILY PO      Take 1 capsule by mouth daily Lacto acid bifidobacterium    Breast cancer, unspecified laterality, Thyroid cancer (H), Chronic arthralgias of knees and hips, unspecified laterality       prochlorperazine 10 MG tablet    COMPAZINE     Take 10 mg by mouth as needed        ranitidine 75 MG tablet    ZANTAC    90 tablet    Take 1 tablet (75 mg) by mouth 3 times daily    Mast cell disease, systemic       senna-docusate 8.6-50 MG per tablet    SENOKOT-S;PERICOLACE    60 tablet    Take 1-2 tablets by mouth 2 times daily as needed for constipation    Acute constipation       SUMAtriptan 50 MG tablet    IMITREX     Take 50 mg by mouth as needed        tamoxifen 20 MG tablet    NOLVADEX    90 tablet    Take 1 tablet (20 mg) by mouth daily    Ductal carcinoma in situ (DCIS) of breast, unspecified laterality       triamcinolone 0.025 % ointment    KENALOG     Apply topically as needed        UNABLE TO FIND      3 tablets 3 times daily MEDICATION NAME: calcium  D-Glucarate  3 caps contain 180mg of elemental calcium.        UNABLE TO FIND      Take 2 capsules by mouth 3 times daily Muscle Mag. 2 caps contain B1 20mg, B2 20mg, B6 10mg, magesium 20mg, manganese 2mg.        UNABLE TO FIND      2 tablets 3 times daily MEDICATION NAME: Digestzymes    Thyroid cancer (H), Postsurgical hypothyroidism, Postsurgical hypoparathyroidism (H)       UNABLE TO FIND      1 tablet daily MEDICATION NAME: Pure Encapsulations    Thyroid cancer (H), Postsurgical hypothyroidism, Postsurgical hypoparathyroidism (H)       VENTOLIN  (90 Base) MCG/ACT Inhaler   Generic drug:  albuterol     18 g    INHALE 2 PUFFS INTO THE LUNGS EVERY 4 HOURS AS NEEDED FOR SHORTNESS OF BREATH OR DIFFICULT BREATHING OR WHEEZING    Mild intermittent asthma without complication       vitamin D3 2000 units Caps     60 capsule    Take 5,000 Units by mouth daily Takes 2 tabs daily    Thyroid cancer (H), Postsurgical hypothyroidism, Papillary carcinoma, follicular variant (H), Postsurgical hypoparathyroidism (H)       * Notice:  This list has 9 medication(s) that are the same as other medications prescribed for you. Read the directions carefully, and ask your doctor or other care provider to review them with you.

## 2018-05-29 NOTE — NURSING NOTE
"Oncology Rooming Note    May 29, 2018 3:40 PM   Tisha Arias is a 57 year old female who presents for:    Chief Complaint   Patient presents with     Oncology Clinic Visit     return - breast      Initial Vitals: /85 (BP Location: Left arm, Patient Position: Chair, Cuff Size: Adult Regular)  Pulse 110  Temp 98.3  F (36.8  C) (Oral)  Wt 84 kg (185 lb 1.6 oz)  SpO2 95%  BMI 30.8 kg/m2 Estimated body mass index is 30.8 kg/(m^2) as calculated from the following:    Height as of 2/23/18: 1.651 m (5' 5\").    Weight as of this encounter: 84 kg (185 lb 1.6 oz). Body surface area is 1.96 meters squared.  Data Unavailable Comment: Data Unavailable   No LMP recorded. Patient has had a hysterectomy.  Allergies reviewed: Yes  Medications reviewed: Yes    Medications: Medication refills not needed today.  Pharmacy name entered into Cognia:    [id number] - [place], [place] - [address] [address] [facility] - [place], [place] - [address] [place] [address] AVE     Clinical concerns: need for refill for tomax    6 minutes for nursing intake (face to face time)     Christen Lawrence CMA            "

## 2018-05-29 NOTE — LETTER
5/29/2018       RE: Tisha Arias  [address] [place] [address]     Dear Colleague,    Thank you for referring your patient, Tisha Arias, to the [facility]. Please see a copy of my visit note below.    May 29, 2018    REASON FOR VISIT:  F/u breast cancer and thyroid cancer     HISTORY OF PRESENT ILLNESS:  Tisha Arias is a 57-year-old woman, postmenopausal, with a history of T1c N0 M0, infiltrating ductal carcinoma of the right breast with a strong family history of breast cancer. BRCA1 and 2 gene testing negative.    Cancer Treatment History for her breast cancer:  -She was diagnosed with breast cancer in 12/2011.    -She underwent bilateral mastectomy with immediate reconstruction on 01/30/2012 by Dr. Daugherty.  Her final pathology revealed 1.6-cm, infiltrating ductal carcinoma, grade 1/3, no angiolymphatic space invasion, no nipple or skin invasion.  There was associated DCIS, and the margins were free of tumor superiorly, anterior margin by 0.1 cm, and widely free at remaining margins.  This was ER/MI-positive, HER2-negative in the vast majority of cells and non-amplified with a ratio of 1.1.  She had an Oncotype test performed, which revealed a low-risk score of 11.  That put her overall risk of recurrence at about 7% after 5 years of tamoxifen.    - in 02/2012, she was started on Arimidex.    - 12/2012, she had prophylactic hysterectomy and oophorectomy, the pathology of which was benign.    - Her course has been complicated by extreme chest wall pain, myofascial pain, and neuropathic pain.  She has gone through several different clinics and was put on several different medications, which were not doing her any good.  Eventually she was weaned off all the medications and is currently doing only PT with myofascial pain maneuvers with symptomatic improvement.   - 2/2014 switched from arimidex to tamoxifen  - presented to the ER on 9/1/17 for chest pain. CT-PA  was negative for PE but showed right 3cm paraspinal mass and subcarinal adenopathy. PET on 9/6/17 showed the paraspinal mass to be hypermetabolic along with hilar and mediastinal nodes.   - biopsy of T10 vertebral body on 9/15/17 showed high-grade B cell lymphoma   - FNA EUS of subcarinal LN on 9/14 showed mostly necrotic tissue with suspicion for malignancy   - pt is receiving DA-EPOCH under the care of Dr Garima Sauceda    Thyroid cancer history: Incidentally, given that she was having so many symptoms, she underwent a PET scan in 4/2013, which revealed a thyroid nodule which was biopsied and was consistent with papillary carcinoma.  She has undergone resection as well as radioiodine treatment since and has done relatively well from the surgery.  She follows up with Dr. Castro for the same.  She unfortunately required etoh ablation therapy over the thyroid lymph nodes.   She remains under the care of Dr Avelina Castro.       INTERVAL HISTORY: In returning today, Elvin comes in after completing dose-adjusted EPOCH under the care of Dr. Gayla Sauceda for her high-grade B-cell lymphoma.      She discussed with me that she continues to also see Dr. Edu Benitez for pain management regarding her back pain and her chest wall pain which has been chronic for her.  She is frustrated this pain has not resolved and will not improved.  She inquires today about having her implants removed.     She currently has no fevers or chills.  She does have chest pain as discussed above for which she is on MS Contin 30 mg 3 times daily in conjunction with oxycodone.  She has previously been on a chest wall topical with topical lidocaine and ketamine.  She has not been able to refill this compound and is wondering whether or not this could be refilled, as this provided significant relief for her.  She sees  Dr. Edu Benitez of Palliative Care for management of her pain and inquired about this today.       She continues to  work.  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  She also has no insurance if she stops working.  This has made it incredibly difficult for her to get through her chemotherapy.  She feels very exhausted and is oftentimes sleeping on the weekends.  She expressed difficulty with getting through her daily activities.  SHe often feels very isolated.     Her 10-point review of systems is otherwise negative.     MEDICATIONS:  reviewed     PHYSICAL EXAMINATION:    VITAL SIGNS:/85 (BP Location: Left arm, Patient Position: Chair, Cuff Size: Adult Regular)  Pulse 110  Temp 98.3  F (36.8  C) (Oral)  Wt 84 kg (185 lb 1.6 oz)  SpO2 95%  BMI 30.8 kg/m2  Vitals in chart and reviewed  GENERAL:  She is well-appearing, appears fatigued, + alopecia   HEENT:  Exam reveals no icterus or pallor.  Oropharynx is clear with no ulcers or lesions.    NECK:  Supple.  No supraclavicular or cervical lymphadenopathy.  She has a scar from thyroid surgery.     HEART:  Regular rate and rhythm.  S1, S2 clear.  No murmur, gallop or rub.    LUNGS:  Clear to auscultation bilaterally.    ABDOMEN:  Soft, nontender, nondistended, no organomegaly.    BREAST EXAM:    No supraclavicular or axillary adenopathy appreciated.  Chest wall with reproducible tenderness on palpation.  No nodules or masses.    MUSC: tenderness in midspine with associated paraspinal tenderness, no other reproducible tenderness  EXTREMITIES:  She has no lower extremity edema. , ambulating without difficulty  SKIN: no rash.    Labs    Lab Results   Component Value Date    WBC 3.5 05/21/2018     Lab Results   Component Value Date    RBC 4.06 05/21/2018     Lab Results   Component Value Date    HGB 12.9 05/21/2018     Lab Results   Component Value Date    HCT 38.1 05/21/2018     No components found for: MCT  Lab Results   Component Value Date    MCV 94 05/21/2018     Lab Results   Component Value Date    MCH 31.8 05/21/2018     Lab Results   Component Value Date    MCHC 33.9  05/21/2018     Lab Results   Component Value Date    RDW 10.9 05/21/2018     Lab Results   Component Value Date     05/21/2018       Recent Labs   Lab Test  05/21/18   1714  02/23/18   1658   NA  138  140   POTASSIUM  3.4  3.9   CHLORIDE  100  105   CO2  30  27   ANIONGAP  8  8   GLC  106*  144*   BUN  15  12   CR  0.98  0.78   ELMO  8.6  8.4*     Liver Function Studies -   Recent Labs   Lab Test  05/21/18   1714   PROTTOTAL  7.2   ALBUMIN  3.9   BILITOTAL  0.4   ALKPHOS  99   AST  24   ALT  27     CT scan personally reviewed by me.      ASSESSMENT AND PLAN:      DLBCL: dx Sept 2017 with thoracic paravertebral soft tissue mass and mediastinal nodes. overexpression of c-myc and high mitotic index (double-hit like). Staging LP and BM bx negative. S/p DA-R-EPOCH.  She is continuing to see Dr Garima Sauceda for mgmt of her lymphoma.  I reassured her that her therapy is curative intent.      Stage I, ER/TX-positive, HER2-negative breast cancer: dx in 2011. Oncotype recurrence score of 11 (7% recurrence risk after 5 years of tamoxifen). S/p bilateral mastectomies and BENJIE/BSO. For endocrine therapy she was on arimidex from 7934-8284, then changed to tamoxifen b/c of arthralgias on Arimidex.  We discussed restarting her tamoxifen.  Refill provided.        Papillary thyroid cancer, it is being followed by Dr. Avelina Castro.  She underwent etoh ablation x3 so far.        Synchronous thyroid cancer and breast cancer.  She is brca 1 and 2 negative.  Additional testing is negative.       Pain:  She is on MS Contin and oxycodone.  I asked palliative care to review her compound topicals to see if this could be of additional benefit for her.      She inquired about getting her breast implants removed.  I discussed that this is a very personal decision.  It will not improve her outcomes from a breast cancer perspective.  There is also no guarantee that her pain will improve with removal of the implants.  I provided her  names of several plastic surgeons.  She is going to think about this.    Return in 6 months    Shahla Crawley MD

## 2018-05-29 NOTE — PROGRESS NOTES
"Oncology/Hematology Visit Note  May 29, 2018     Reason for Visit: Follow up of DLBCL    History of Present Illness: Tisha Arias is a 57 year old female with high risk diffuse \"double-hit\"-like DLBCL. She is currently undergoing treatment with DA-R-EPOCH. Her past medical history is significant for breast cancer in 2011 s/p bilateral mastectomies and BENJIE/BSO up until recently on Tamoxifen, papillary thyroid cancer s/p total thyroidectomy, chronic chest wall pain, and asthma. Briefly, her oncologic history is as follows:    She presented in 8/2017 with dramatically worse chest and back pain. CT 9/1/17 showed lytic lesion right 10th rib extending to right T9-10 neural foramen with vertebral body invasion. There was associated conglomerate of lymphadenopathy around the mid T-spine. She had a CT guided biopsy showing B-cell high grade lymphoma. Pathology showed \"The morphology shows a population of large cells, diffuse lymphoid infiltrate. The cells are atypical with abundant mitotic and apoptotic activity and single cell necrosis. Tumor is CD45 and CD79a positive and focally weakly CD30 positive. The other stains revealed positivity for CD20, BCL6, BCL2 and MUM1, and CD10 and CD21 negative. The CD5 and CD3 highlighted background T-cells.  MYC expression was equivocal with approximately 40% nuclei staining.  Ki-67 proliferative index is greater than 90-95%. The immunohistochemistry is suggestive of non-GCB immunophenotype of diffuse large B-cell lymphoma. The cytogenetics did not show rearrangement of the BCL2 or c-MYC. There is the presence of BCL6 rearrangement in 78% of cells and gains of MYC and BCL2, but no amplifications.\"     Staging LP and BMBx were negative for lymphoma.   She started DA-REPOCH 10/6/17. She did well with chemo other than rigors during CIVI.  Post cycle 1 complicated by mucositis and worsening of chronic LE peripheral edema.   Cycle 2 on 10/27/17. Due to a rise in her LD and uric acid " levels on that admission day, she underwent a CT scan which showed response to therapy with improvement in her mediastinal lymphadenopathy and right chest wall mass.  Cycle 3 on 11/16      Cycle 4 12/7    Interval History:  She reports that overall since hospital discharge she has been doing okay. Does complain of discomfort around her port and under her breast. Reports that she recovered from this cycle of chemo more quickly than before.       Current Outpatient Prescriptions   Medication Sig Dispense Refill     ACAI RAMIREZ PO Take 50 mg by mouth       acetaminophen (TYLENOL) 325 MG tablet Take 2 tablets (650 mg) by mouth every 4 hours as needed for mild pain or fever 100 tablet      acyclovir (ZOVIRAX) 400 MG tablet Take 1 tablet (400 mg) by mouth 2 times daily 30 tablet 0     aluminum chloride (DRYSOL) 20 % external solution Apply topically At Bedtime 60 mL 3     bisacodyl (DULCOLAX) 5 MG EC tablet Take 3 tablets (15 mg) by mouth daily as needed for constipation 30 tablet 0     calcitRIOL (ROCALTROL) 0.25 MCG capsule TAKE 2 CAPSULES BY MOUTH EVERY MORNING AND TAKE 1 CAPSULE BY MOUTH EVERY NIGHT AT BEDTIME 90 capsule 11     calcium Citrate-vitamin D 500-400 MG-UNIT CHEW Take 1 tablet by mouth 3 times daily 60 tablet      celecoxib (CELEBREX) 200 MG capsule Take 1 capsule (200 mg) by mouth 2 times daily 60 capsule 3     cetirizine (ZYRTEC ALLERGY) 10 MG tablet Take 1 tablet (10 mg) by mouth 3 times daily On hold for lab test. 90 tablet 0     Cholecalciferol (VITAMIN D3) 2000 UNITS CAPS Take 5,000 Units by mouth daily Takes 2 tabs daily 60 capsule 0     COMPOUND CONTAINING CONTROLLED SUBSTANCE (CMPD RX) - PHARMACY TO MIX COMPOUNDED MEDICATION Apply small amount to affected area two times daily. 120 g 6     cromolyn (OPTICROM) 4 % ophthalmic solution Place 1 drop into both eyes 4 times daily 10 mL 0     cromolyn sodium (NASALCROM) 5.2 MG/ACT AERS Inhaler SPRAY ONE SPRAY( 1 ML) IN NOSTRIL DAILY 1 Bottle 6     [employer] FIBER  GUMMY BEARS CHILDREN CHEW Take 5 g by mouth daily (2 gummy= 5 g =1 serving) 120 tablet 3     cyclobenzaprine (FLEXERIL) 10 MG tablet Take 1 tablet (10 mg) by mouth 3 times daily as needed (Patient taking differently: Take 10 mg by mouth 3 times daily ) 30 tablet 0     diazepam (VALIUM) 5 MG tablet Take 1 tablet (5 mg) by mouth 3 times daily as needed for muscle spasms 90 tablet 1     diclofenac (VOLTAREN) 1 % GEL topical gel Apply affected area two times daily PRN using enclosed dosing card. 100 g 3     EPINEPHrine (EPIPEN 2-CARIDAD) 0.3 MG/0.3ML injection Inject 0.3 mLs (0.3 mg) into the muscle once as needed for anaphylaxis 0.6 mL 3     fluticasone (FLONASE) 50 MCG/ACT spray Spray 1-2 sprays into both nostrils daily 1 Bottle 11     furosemide (LASIX) 20 MG tablet Take 1 tablet (20 mg) by mouth 2 times daily 60 tablet 3     furosemide (LASIX) 20 MG tablet Take 1 tablet (20 mg) by mouth 2 times daily 60 tablet 0     hydrochlorothiazide (MICROZIDE) 12.5 MG capsule Take 1 capsule (12.5 mg) by mouth daily 30 capsule 0     levofloxacin (LEVAQUIN) 750 MG tablet Take 1 tablet (750 mg) by mouth daily 10 tablet 0     levothyroxine (SYNTHROID/LEVOTHROID) 112 MCG tablet Mon-Sat: (2 tabs daily) Sunday: 3 tabs (Patient taking differently: 112 mcg Mon-Sat: (2 tabs daily) Sunday: 3 tabs) 189 tablet 3     lidocaine (XYLOCAINE) 5 % ointment Apply quarter size amount to chest and back up to 3 times daily as needed for pain. 350 g 1     lidocaine-prilocaine (EMLA) cream Apply topically as needed for moderate pain 60 g 1     lidocaine-prilocaine (EMLA) cream Apply topically as needed (port access pain.) 30 g 1     magic mouthwash (FIRST-MOUTHWASH BLM) suspension Swish and spit 5-10 mLs in mouth every 6 hours as needed for mouth sores 237 mL 1     magic mouthwash suspension (diphenhydrAMINE, lidocaine, aluminum-magnesium & simethicone) Swish and spit 10 mLs in mouth every 6 hours as needed for mouth sores 360 mL 1     methocarbamol  (ROBAXIN) 750 MG tablet Take 1 tablet (750 mg) by mouth 4 times daily . Ok to take a 5th Robaxin on very severe days. 360 tablet 1     montelukast (SINGULAIR) 10 MG tablet Take 2 tablets (20 mg) by mouth 2 times daily 60 tablet 0     morphine (MS CONTIN) 30 MG 12 hr tablet Take 1 tablet (30 mg) by mouth every 8 hours . Take an addition pill at night such that your evening dose is 60mg 120 tablet 0     ondansetron (ZOFRAN-ODT) 8 MG ODT tab Take 1 tablet (8 mg) by mouth every 8 hours as needed 30 tablet 0     ondansetron (ZOFRAN-ODT) 8 MG ODT tab Take 1 tablet (8 mg) by mouth every 8 hours as needed (Patient taking differently: Take 8 mg by mouth every 8 hours ) 30 tablet 1     order for DME Compression socks - knee high 20-30 mmHg zippered if possible 1 Units 11     order for DME Equipment being ordered: compression stockings. 20-30 mm or 30 - 40 mm as patient can tolerate. Physical therapy to determine if they should be above or below the knee. 2 Units 4     order for DME Equipment being ordered: compression bra 2 Device 1     order for DME Compression stockings, medium grade, please measure and fit. Please dispense a pair. 1 Units 0     oxyCODONE IR (ROXICODONE) 30 MG tablet Take 1 tablet (30 mg) by mouth every 4 hours as needed for moderate to severe pain 180 tablet 0     polyethylene glycol (MIRALAX) powder Take 17 g (1 capful) by mouth daily 510 g 0     potassium chloride SA (KLOR-CON) 20 MEQ CR tablet Take 1 tablet (20 mEq) by mouth 2 times daily 60 tablet 3     Probiotic Product (PROBIOTIC DAILY PO) Take 1 capsule by mouth daily Lacto acid bifidobacterium       prochlorperazine (COMPAZINE) 10 MG tablet Take 10 mg by mouth as needed  3     ranitidine (ZANTAC) 75 MG tablet Take 1 tablet (75 mg) by mouth 3 times daily 90 tablet 0     senna-docusate (SENOKOT-S;PERICOLACE) 8.6-50 MG per tablet Take 1-2 tablets by mouth 2 times daily as needed for constipation 60 tablet 0     SUMAtriptan (IMITREX) 50 MG tablet Take  50 mg by mouth as needed  3     tamoxifen (NOLVADEX) 20 MG tablet Take 1 tablet (20 mg) by mouth daily 90 tablet 3     triamcinolone (KENALOG) 0.025 % ointment Apply topically as needed  3     Triamcinolone Acetonide (AZMACORT IN) Inhale 2 puffs into the lungs as needed       UNABLE TO FIND Take 2 capsules by mouth 3 times daily Muscle Mag. 2 caps contain B1 20mg, B2 20mg, B6 10mg, magesium 20mg, manganese 2mg.       UNABLE TO FIND 3 tablets 3 times daily MEDICATION NAME: calcium D-Glucarate   3 caps contain 180mg of elemental calcium.       UNABLE TO FIND 2 tablets 3 times daily MEDICATION NAME: Digestzymes        UNABLE TO FIND 1 tablet daily MEDICATION NAME: Pure Encapsulations       VENTOLIN  (90 BASE) MCG/ACT Inhaler INHALE 2 PUFFS INTO THE LUNGS EVERY 4 HOURS AS NEEDED FOR SHORTNESS OF BREATH OR DIFFICULT BREATHING OR WHEEZING 18 g 3     Physical Examination:  There were no vitals taken for this visit.  Wt Readings from Last 10 Encounters:   02/23/18 89.1 kg (196 lb 8 oz)   02/01/18 86.9 kg (191 lb 8 oz)   01/29/18 81.1 kg (178 lb 14.4 oz)   01/24/18 86.3 kg (190 lb 4.8 oz)   01/22/18 87.2 kg (192 lb 3.9 oz)   01/18/18 88.2 kg (194 lb 8 oz)   01/15/18 86.1 kg (189 lb 14.4 oz)   01/09/18 82.3 kg (181 lb 8 oz)   01/03/18 85.7 kg (189 lb)   01/01/18 87.5 kg (193 lb)   Constitutional: NAD  Eyes:  PERRL. No scleral icterus.  ENT: Oral mucosa is moist without lesions or thrush.   Cardiovascular: Regular rate and rhythm. Port accessed without surrounding erythema or drainage  Respiratory: Clear to auscultation bilaterally. No wheezes or crackles.  Gastrointestinal: Bowel sounds +. Soft, nontender  Neurologic: normal speech, up to exam table unaided  Skin:No rashes noted.   Extremities: trace lower extremity edema bilaterally, compression stockings in place.    Laboratory Data:    CT 5/21/2018  IMPRESSION: In this patient with a history of multiple malignancies  including diffuse large B-cell lymphoma, breast  "cancer, and papillary  thyroid cancer:  1. Treatment related changes in the posterior right 10th rib with  slightly increased sclerosis and minimal residual soft tissue  thickening. No evidence of disease progression or new metastatic  disease in the chest, abdomen or pelvis.  2. Slightly increased intra and extra hepatic biliary ductal  dilatation, may be reservoir effect from prior cholecystectomy,  correlate with liver function tests.  3. Bilateral nonobstructing renal calculi, largest measuring 3 mm in  the inferior pole right kidney.         Assessment and Plan:    DLBCL, \"double-hit\"-like, with c-myc overexpression but not re-arrangement. s/p   6 cycles of DA-R-EPOCH     Complication include mucositis,  infusion reaction (rigors), nausea and fatigue. ONly needed PRBC once. No infecions.       PET scan with CR with no PET avidity on 12/21 and EOT PET on 2/27 confirmed CR.    Elvin had a post-treatment/end of treatment CT/PET in May 2018 which showed a complete response with resolution of all sites of disease.  The bony area along the tenth rib is stable but has very low level FDG with an SUV of 4.4.  There are no new suspicious osseous lesions.  I am pleased to share with her that she is in CR.    May 2018 - ongoing CR.    The plan is to continue observation.  She will see me back in 3 months.  I recommended to give Shignrix vaccine today and plan for boost in 2 months. OK to stop acyclovir.     Follow up with Dr. Castro for hypothyroidism.   F/u with  who started Tamoxifen at this time.     History of Rachael en Y gastric bypass  Continue supplements (calcium citrate-vitamin D, vitamin D3, magnesium citrate). Also had iron deficiency anemia likely from poor absorption. S/o iron supplementation. Will monitor Hgb.    Chronic chest wall pain s/p mastectomies  Follows with Dr. Benitez. Palliative care to continue to prescribe pain medications. Taking MS Contin, Oxycodone for breakthrough pain, and " celebrex. Continued to encourage pt to wean down on pain meds as tolerated. She appears lethargic  at times during clinica visit.      Garima Sauceda MD  Associate Professor of Medicine  [phone]

## 2018-05-29 NOTE — LETTER
"5/29/2018       RE: Tisha Arias  [address] [place] [address]     Dear Colleague,    Thank you for referring your patient, Tisha Arias, to the [facility] at [facility]. Please see a copy of my visit note below.    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 She is currently undergoing treatment with DA-R-EPOCH. Her past medical history is significant for breast cancer in 2011 s/p bilateral mastectomies and BENJIE/BSO up until recently on Tamoxifen, papillary thyroid cancer s/p total thyroidectomy, chronic chest wall pain, and asthma. Briefly, her oncologic history is as follows:    She presented in 8/2017 with dramatically worse chest and back pain. CT 9/1/17 showed lytic lesion right 10th rib extending to right T9-10 neural foramen with vertebral body invasion. There was associated conglomerate of lymphadenopathy around the mid T-spine. She had a CT guided biopsy showing B-cell high grade lymphoma. Pathology showed \"The morphology shows a population of large cells, diffuse lymphoid infiltrate. The cells are atypical with abundant mitotic and apoptotic activity and single cell necrosis. Tumor is CD45 and CD79a positive and focally weakly CD30 positive. The other stains revealed positivity for CD20, BCL6, BCL2 and MUM1, and CD10 and CD21 negative. The CD5 and CD3 highlighted background T-cells.  MYC expression was equivocal with approximately 40% nuclei staining.  Ki-67 proliferative index is greater than 90-95%. The immunohistochemistry is suggestive of non-GCB immunophenotype of diffuse large B-cell lymphoma. The cytogenetics did not show rearrangement of the BCL2 or c-MYC. There is the presence of BCL6 rearrangement in 78% of cells and gains of MYC and BCL2, but no " "amplifications.\"     Staging LP and BMBx were negative for lymphoma.   She started DA-REPOCH 10/6/17. She did well with chemo other than rigors during CIVI.  Post cycle 1 complicated by mucositis and worsening of chronic LE peripheral edema.   Cycle 2 on 10/27/17. Due to a rise in her LD and uric acid levels on that admission day, she underwent a CT scan which showed response to therapy with improvement in her mediastinal lymphadenopathy and right chest wall mass.  Cycle 3 on 11/16      Cycle 4 12/7    Interval History:  She reports that overall since hospital discharge she has been doing okay. Does complain of discomfort around her port and under her breast. Reports that she recovered from this cycle of chemo more quickly than before.       Current Outpatient Prescriptions   Medication Sig Dispense Refill     ACAI RAMIREZ PO Take 50 mg by mouth       acetaminophen (TYLENOL) 325 MG tablet Take 2 tablets (650 mg) by mouth every 4 hours as needed for mild pain or fever 100 tablet      acyclovir (ZOVIRAX) 400 MG tablet Take 1 tablet (400 mg) by mouth 2 times daily 30 tablet 0     aluminum chloride (DRYSOL) 20 % external solution Apply topically At Bedtime 60 mL 3     bisacodyl (DULCOLAX) 5 MG EC tablet Take 3 tablets (15 mg) by mouth daily as needed for constipation 30 tablet 0     calcitRIOL (ROCALTROL) 0.25 MCG capsule TAKE 2 CAPSULES BY MOUTH EVERY MORNING AND TAKE 1 CAPSULE BY MOUTH EVERY NIGHT AT BEDTIME 90 capsule 11     calcium Citrate-vitamin D 500-400 MG-UNIT CHEW Take 1 tablet by mouth 3 times daily 60 tablet      celecoxib (CELEBREX) 200 MG capsule Take 1 capsule (200 mg) by mouth 2 times daily 60 capsule 3     cetirizine (ZYRTEC ALLERGY) 10 MG tablet Take 1 tablet (10 mg) by mouth 3 times daily On hold for lab test. 90 tablet 0     Cholecalciferol (VITAMIN D3) 2000 UNITS CAPS Take 5,000 Units by mouth daily Takes 2 tabs daily 60 capsule 0     COMPOUND CONTAINING CONTROLLED SUBSTANCE (CMPD RX) - PHARMACY TO " MIX COMPOUNDED MEDICATION Apply small amount to affected area two times daily. 120 g 6     cromolyn (OPTICROM) 4 % ophthalmic solution Place 1 drop into both eyes 4 times daily 10 mL 0     cromolyn sodium (NASALCROM) 5.2 MG/ACT AERS Inhaler SPRAY ONE SPRAY( 1 ML) IN NOSTRIL DAILY 1 Bottle 6     [employer] FIBER GUMMY BEARS CHILDREN CHEW Take 5 g by mouth daily (2 gummy= 5 g =1 serving) 120 tablet 3     cyclobenzaprine (FLEXERIL) 10 MG tablet Take 1 tablet (10 mg) by mouth 3 times daily as needed (Patient taking differently: Take 10 mg by mouth 3 times daily ) 30 tablet 0     diazepam (VALIUM) 5 MG tablet Take 1 tablet (5 mg) by mouth 3 times daily as needed for muscle spasms 90 tablet 1     diclofenac (VOLTAREN) 1 % GEL topical gel Apply affected area two times daily PRN using enclosed dosing card. 100 g 3     EPINEPHrine (EPIPEN 2-CARIDAD) 0.3 MG/0.3ML injection Inject 0.3 mLs (0.3 mg) into the muscle once as needed for anaphylaxis 0.6 mL 3     fluticasone (FLONASE) 50 MCG/ACT spray Spray 1-2 sprays into both nostrils daily 1 Bottle 11     furosemide (LASIX) 20 MG tablet Take 1 tablet (20 mg) by mouth 2 times daily 60 tablet 3     furosemide (LASIX) 20 MG tablet Take 1 tablet (20 mg) by mouth 2 times daily 60 tablet 0     hydrochlorothiazide (MICROZIDE) 12.5 MG capsule Take 1 capsule (12.5 mg) by mouth daily 30 capsule 0     levofloxacin (LEVAQUIN) 750 MG tablet Take 1 tablet (750 mg) by mouth daily 10 tablet 0     levothyroxine (SYNTHROID/LEVOTHROID) 112 MCG tablet Mon-Sat: (2 tabs daily) Sunday: 3 tabs (Patient taking differently: 112 mcg Mon-Sat: (2 tabs daily) Sunday: 3 tabs) 189 tablet 3     lidocaine (XYLOCAINE) 5 % ointment Apply quarter size amount to chest and back up to 3 times daily as needed for pain. 350 g 1     lidocaine-prilocaine (EMLA) cream Apply topically as needed for moderate pain 60 g 1     lidocaine-prilocaine (EMLA) cream Apply topically as needed (port access pain.) 30 g 1     magic mouthwash  (FIRST-MOUTHWASH BLM) suspension Swish and spit 5-10 mLs in mouth every 6 hours as needed for mouth sores 237 mL 1     magic mouthwash suspension (diphenhydrAMINE, lidocaine, aluminum-magnesium & simethicone) Swish and spit 10 mLs in mouth every 6 hours as needed for mouth sores 360 mL 1     methocarbamol (ROBAXIN) 750 MG tablet Take 1 tablet (750 mg) by mouth 4 times daily . Ok to take a 5th Robaxin on very severe days. 360 tablet 1     montelukast (SINGULAIR) 10 MG tablet Take 2 tablets (20 mg) by mouth 2 times daily 60 tablet 0     morphine (MS CONTIN) 30 MG 12 hr tablet Take 1 tablet (30 mg) by mouth every 8 hours . Take an addition pill at night such that your evening dose is 60mg 120 tablet 0     ondansetron (ZOFRAN-ODT) 8 MG ODT tab Take 1 tablet (8 mg) by mouth every 8 hours as needed 30 tablet 0     ondansetron (ZOFRAN-ODT) 8 MG ODT tab Take 1 tablet (8 mg) by mouth every 8 hours as needed (Patient taking differently: Take 8 mg by mouth every 8 hours ) 30 tablet 1     order for DME Compression socks - knee high 20-30 mmHg zippered if possible 1 Units 11     order for DME Equipment being ordered: compression stockings. 20-30 mm or 30 - 40 mm as patient can tolerate. Physical therapy to determine if they should be above or below the knee. 2 Units 4     order for DME Equipment being ordered: compression bra 2 Device 1     order for DME Compression stockings, medium grade, please measure and fit. Please dispense a pair. 1 Units 0     oxyCODONE IR (ROXICODONE) 30 MG tablet Take 1 tablet (30 mg) by mouth every 4 hours as needed for moderate to severe pain 180 tablet 0     polyethylene glycol (MIRALAX) powder Take 17 g (1 capful) by mouth daily 510 g 0     potassium chloride SA (KLOR-CON) 20 MEQ CR tablet Take 1 tablet (20 mEq) by mouth 2 times daily 60 tablet 3     Probiotic Product (PROBIOTIC DAILY PO) Take 1 capsule by mouth daily Lacto acid bifidobacterium       prochlorperazine (COMPAZINE) 10 MG tablet Take  10 mg by mouth as needed  3     ranitidine (ZANTAC) 75 MG tablet Take 1 tablet (75 mg) by mouth 3 times daily 90 tablet 0     senna-docusate (SENOKOT-S;PERICOLACE) 8.6-50 MG per tablet Take 1-2 tablets by mouth 2 times daily as needed for constipation 60 tablet 0     SUMAtriptan (IMITREX) 50 MG tablet Take 50 mg by mouth as needed  3     tamoxifen (NOLVADEX) 20 MG tablet Take 1 tablet (20 mg) by mouth daily 90 tablet 3     triamcinolone (KENALOG) 0.025 % ointment Apply topically as needed  3     Triamcinolone Acetonide (AZMACORT IN) Inhale 2 puffs into the lungs as needed       UNABLE TO FIND Take 2 capsules by mouth 3 times daily Muscle Mag. 2 caps contain B1 20mg, B2 20mg, B6 10mg, magesium 20mg, manganese 2mg.       UNABLE TO FIND 3 tablets 3 times daily MEDICATION NAME: calcium D-Glucarate   3 caps contain 180mg of elemental calcium.       UNABLE TO FIND 2 tablets 3 times daily MEDICATION NAME: Digestzymes        UNABLE TO FIND 1 tablet daily MEDICATION NAME: Pure Encapsulations       VENTOLIN  (90 BASE) MCG/ACT Inhaler INHALE 2 PUFFS INTO THE LUNGS EVERY 4 HOURS AS NEEDED FOR SHORTNESS OF BREATH OR DIFFICULT BREATHING OR WHEEZING 18 g 3     Physical Examination:  There were no vitals taken for this visit.  Wt Readings from Last 10 Encounters:   02/23/18 89.1 kg (196 lb 8 oz)   02/01/18 86.9 kg (191 lb 8 oz)   01/29/18 81.1 kg (178 lb 14.4 oz)   01/24/18 86.3 kg (190 lb 4.8 oz)   01/22/18 87.2 kg (192 lb 3.9 oz)   01/18/18 88.2 kg (194 lb 8 oz)   01/15/18 86.1 kg (189 lb 14.4 oz)   01/09/18 82.3 kg (181 lb 8 oz)   01/03/18 85.7 kg (189 lb)   01/01/18 87.5 kg (193 lb)   Constitutional: NAD  Eyes:  PERRL. No scleral icterus.  ENT: Oral mucosa is moist without lesions or thrush.   Cardiovascular: Regular rate and rhythm. Port accessed without surrounding erythema or drainage  Respiratory: Clear to auscultation bilaterally. No wheezes or crackles.  Gastrointestinal: Bowel sounds +. Soft,  "nontender  Neurologic: normal speech, up to exam table unaided  Skin:No rashes noted.   Extremities: trace lower extremity edema bilaterally, compression stockings in place.    Laboratory Data:    CT 5/21/2018  IMPRESSION: In this patient with a history of multiple malignancies  including diffuse large B-cell lymphoma, breast cancer, and papillary  thyroid cancer:  1. Treatment related changes in the posterior right 10th rib with  slightly increased sclerosis and minimal residual soft tissue  thickening. No evidence of disease progression or new metastatic  disease in the chest, abdomen or pelvis.  2. Slightly increased intra and extra hepatic biliary ductal  dilatation, may be reservoir effect from prior cholecystectomy,  correlate with liver function tests.  3. Bilateral nonobstructing renal calculi, largest measuring 3 mm in  the inferior pole right kidney.         Assessment and Plan:    DLBCL, \"double-hit\"-like, with c-myc overexpression but not re-arrangement. s/p   6 cycles of DA-R-EPOCH     Complication include mucositis,  infusion reaction (rigors), nausea and fatigue. ONly needed PRBC once. No infecions.       PET scan with CR with no PET avidity on 12/21 and EOT PET on 2/27 confirmed CR.    Elvin had a post-treatment/end of treatment CT/PET in May 2018 which showed a complete response with resolution of all sites of disease.  The bony area along the tenth rib is stable but has very low level FDG with an SUV of 4.4.  There are no new suspicious osseous lesions.  I am pleased to share with her that she is in CR.    May 2018 - ongoing CR.    The plan is to continue observation.  She will see me back in 3 months.  I recommended to give Shignrix vaccine today and plan for boost in 2 months. OK to stop acyclovir.     Follow up with Dr. Castro for hypothyroidism.   F/u with  who started Tamoxifen at this time.     History of Rachael en Y gastric bypass  Continue supplements (calcium citrate-vitamin D, " vitamin D3, magnesium citrate). Also had iron deficiency anemia likely from poor absorption. S/o iron supplementation. Will monitor Hgb.    Chronic chest wall pain s/p mastectomies  Follows with Dr. Benitez. Palliative care to continue to prescribe pain medications. Taking MS Contin, Oxycodone for breakthrough pain, and celebrex. Continued to encourage pt to wean down on pain meds as tolerated. She appears lethargic  at times during clinica visit.      Garima Sauceda MD  Associate Professor of Medicine  [phone]

## 2018-05-31 NOTE — PROGRESS NOTES
May 29, 2018    REASON FOR VISIT:  F/u breast cancer and thyroid cancer     HISTORY OF PRESENT ILLNESS:  Tisha Arias is a 57-year-old woman, postmenopausal, with a history of T1c N0 M0, infiltrating ductal carcinoma of the right breast with a strong family history of breast cancer. BRCA1 and 2 gene testing negative.    Cancer Treatment History for her breast cancer:  -She was diagnosed with breast cancer in 12/2011.    -She underwent bilateral mastectomy with immediate reconstruction on 01/30/2012 by Dr. Daugherty.  Her final pathology revealed 1.6-cm, infiltrating ductal carcinoma, grade 1/3, no angiolymphatic space invasion, no nipple or skin invasion.  There was associated DCIS, and the margins were free of tumor superiorly, anterior margin by 0.1 cm, and widely free at remaining margins.  This was ER/RI-positive, HER2-negative in the vast majority of cells and non-amplified with a ratio of 1.1.  She had an Oncotype test performed, which revealed a low-risk score of 11.  That put her overall risk of recurrence at about 7% after 5 years of tamoxifen.    - in 02/2012, she was started on Arimidex.    - 12/2012, she had prophylactic hysterectomy and oophorectomy, the pathology of which was benign.    - Her course has been complicated by extreme chest wall pain, myofascial pain, and neuropathic pain.  She has gone through several different clinics and was put on several different medications, which were not doing her any good.  Eventually she was weaned off all the medications and is currently doing only PT with myofascial pain maneuvers with symptomatic improvement.   - 2/2014 switched from arimidex to tamoxifen  - presented to the ER on 9/1/17 for chest pain. CT-PA was negative for PE but showed right 3cm paraspinal mass and subcarinal adenopathy. PET on 9/6/17 showed the paraspinal mass to be hypermetabolic along with hilar and mediastinal nodes.   - biopsy of T10 vertebral body on 9/15/17 showed high-grade  B cell lymphoma   - FNA EUS of subcarinal LN on 9/14 showed mostly necrotic tissue with suspicion for malignancy   - pt is receiving DA-EPOCH under the care of Dr Garima Sauceda    Thyroid cancer history: Incidentally, given that she was having so many symptoms, she underwent a PET scan in 4/2013, which revealed a thyroid nodule which was biopsied and was consistent with papillary carcinoma.  She has undergone resection as well as radioiodine treatment since and has done relatively well from the surgery.  She follows up with Dr. Castro for the same.  She unfortunately required etoh ablation therapy over the thyroid lymph nodes.   She remains under the care of Dr Avelina Castro.       INTERVAL HISTORY: In returning today, Elvin comes in after completing dose-adjusted EPOCH under the care of Dr. Gayla Sauceda for her high-grade B-cell lymphoma.      She discussed with me that she continues to also see Dr. Edu Benitez for pain management regarding her back pain and her chest wall pain which has been chronic for her.  She is frustrated this pain has not resolved and will not improved.  She inquires today about having her implants removed.     She currently has no fevers or chills.  She does have chest pain as discussed above for which she is on MS Contin 30 mg 3 times daily in conjunction with oxycodone.  She has previously been on a chest wall topical with topical lidocaine and ketamine.  She has not been able to refill this compound and is wondering whether or not this could be refilled, as this provided significant relief for her.  She sees  Dr. Edu Benitez of Palliative Care for management of her pain and inquired about this today.       She continues to work.  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  She also has no insurance if she stops working.  This has made it incredibly difficult for her to get through her chemotherapy.  She feels very exhausted and is oftentimes sleeping on the  weekends.  She expressed difficulty with getting through her daily activities.  SHe often feels very isolated.     Her 10-point review of systems is otherwise negative.     MEDICATIONS:  reviewed     PHYSICAL EXAMINATION:    VITAL SIGNS:/85 (BP Location: Left arm, Patient Position: Chair, Cuff Size: Adult Regular)  Pulse 110  Temp 98.3  F (36.8  C) (Oral)  Wt 84 kg (185 lb 1.6 oz)  SpO2 95%  BMI 30.8 kg/m2  Vitals in chart and reviewed  GENERAL:  She is well-appearing, appears fatigued, + alopecia   HEENT:  Exam reveals no icterus or pallor.  Oropharynx is clear with no ulcers or lesions.    NECK:  Supple.  No supraclavicular or cervical lymphadenopathy.  She has a scar from thyroid surgery.     HEART:  Regular rate and rhythm.  S1, S2 clear.  No murmur, gallop or rub.    LUNGS:  Clear to auscultation bilaterally.    ABDOMEN:  Soft, nontender, nondistended, no organomegaly.    BREAST EXAM:    No supraclavicular or axillary adenopathy appreciated.  Chest wall with reproducible tenderness on palpation.  No nodules or masses.    MUSC: tenderness in midspine with associated paraspinal tenderness, no other reproducible tenderness  EXTREMITIES:  She has no lower extremity edema. , ambulating without difficulty  SKIN: no rash.    Labs    Lab Results   Component Value Date    WBC 3.5 05/21/2018     Lab Results   Component Value Date    RBC 4.06 05/21/2018     Lab Results   Component Value Date    HGB 12.9 05/21/2018     Lab Results   Component Value Date    HCT 38.1 05/21/2018     No components found for: MCT  Lab Results   Component Value Date    MCV 94 05/21/2018     Lab Results   Component Value Date    MCH 31.8 05/21/2018     Lab Results   Component Value Date    MCHC 33.9 05/21/2018     Lab Results   Component Value Date    RDW 10.9 05/21/2018     Lab Results   Component Value Date     05/21/2018       Recent Labs   Lab Test  05/21/18   1714  02/23/18   1658   NA  138  140   POTASSIUM  3.4  3.9    CHLORIDE  100  105   CO2  30  27   ANIONGAP  8  8   GLC  106*  144*   BUN  15  12   CR  0.98  0.78   ELMO  8.6  8.4*     Liver Function Studies -   Recent Labs   Lab Test  05/21/18   1714   PROTTOTAL  7.2   ALBUMIN  3.9   BILITOTAL  0.4   ALKPHOS  99   AST  24   ALT  27     CT scan personally reviewed by me.      ASSESSMENT AND PLAN:      DLBCL: dx Sept 2017 with thoracic paravertebral soft tissue mass and mediastinal nodes. overexpression of c-myc and high mitotic index (double-hit like). Staging LP and BM bx negative. S/p DA-R-EPOCH.  She is continuing to see Dr Garima Sauceda for mgmt of her lymphoma.  I reassured her that her therapy is curative intent.      Stage I, ER/KS-positive, HER2-negative breast cancer: dx in 2011. Oncotype recurrence score of 11 (7% recurrence risk after 5 years of tamoxifen). S/p bilateral mastectomies and BENJIE/BSO. For endocrine therapy she was on arimidex from 9874-0819, then changed to tamoxifen b/c of arthralgias on Arimidex.  We discussed restarting her tamoxifen.  Refill provided.        Papillary thyroid cancer, it is being followed by Dr. Avelina Castro.  She underwent etoh ablation x3 so far.        Synchronous thyroid cancer and breast cancer.  She is brca 1 and 2 negative.  Additional testing is negative.       Pain:  She is on MS Contin and oxycodone.  I asked palliative care to review her compound topicals to see if this could be of additional benefit for her.      She inquired about getting her breast implants removed.  I discussed that this is a very personal decision.  It will not improve her outcomes from a breast cancer perspective.  There is also no guarantee that her pain will improve with removal of the implants.  I provided her names of several plastic surgeons.  She is going to think about this.    Return in 6 months    Shahla Crawley MD

## 2018-06-08 ENCOUNTER — OFFICE VISIT (OUTPATIENT)
Dept: PALLIATIVE CARE | Facility: CLINIC | Age: 58
End: 2018-06-08
Attending: INTERNAL MEDICINE
Payer: COMMERCIAL

## 2018-06-08 ENCOUNTER — CARE COORDINATION (OUTPATIENT)
Dept: ONCOLOGY | Facility: CLINIC | Age: 58
End: 2018-06-08

## 2018-06-08 VITALS
RESPIRATION RATE: 16 BRPM | SYSTOLIC BLOOD PRESSURE: 141 MMHG | HEART RATE: 121 BPM | WEIGHT: 181.44 LBS | DIASTOLIC BLOOD PRESSURE: 92 MMHG | OXYGEN SATURATION: 97 % | HEIGHT: 65 IN | TEMPERATURE: 98.4 F | BODY MASS INDEX: 30.23 KG/M2

## 2018-06-08 DIAGNOSIS — C85.10 HIGH GRADE B-CELL LYMPHOMA (H): ICD-10-CM

## 2018-06-08 DIAGNOSIS — M79.2 NEUROPATHIC PAIN OF CHEST: Primary | ICD-10-CM

## 2018-06-08 DIAGNOSIS — G89.3 NEOPLASM RELATED PAIN: ICD-10-CM

## 2018-06-08 PROCEDURE — G0463 HOSPITAL OUTPT CLINIC VISIT: HCPCS | Mod: ZF

## 2018-06-08 PROCEDURE — 99214 OFFICE O/P EST MOD 30 MIN: CPT | Performed by: INTERNAL MEDICINE

## 2018-06-08 RX ORDER — MORPHINE SULFATE 30 MG/1
30 TABLET, FILM COATED, EXTENDED RELEASE ORAL EVERY 8 HOURS
Qty: 120 TABLET | Refills: 0 | Status: SHIPPED | OUTPATIENT
Start: 2018-06-16 | End: 2018-06-08

## 2018-06-08 RX ORDER — MORPHINE SULFATE 30 MG/1
30 TABLET, FILM COATED, EXTENDED RELEASE ORAL EVERY 8 HOURS
Qty: 120 TABLET | Refills: 0 | Status: SHIPPED | OUTPATIENT
Start: 2018-07-14 | End: 2018-07-27

## 2018-06-08 RX ORDER — OXYCODONE HYDROCHLORIDE 30 MG/1
30 TABLET ORAL EVERY 4 HOURS PRN
Qty: 180 TABLET | Refills: 0 | Status: SHIPPED | OUTPATIENT
Start: 2018-06-16 | End: 2018-06-08

## 2018-06-08 RX ORDER — OXYCODONE HYDROCHLORIDE 30 MG/1
30 TABLET ORAL EVERY 4 HOURS PRN
Qty: 180 TABLET | Refills: 0 | Status: SHIPPED | OUTPATIENT
Start: 2018-07-14 | End: 2018-07-27

## 2018-06-08 ASSESSMENT — PAIN SCALES - GENERAL: PAINLEVEL: EXTREME PAIN (8)

## 2018-06-08 NOTE — LETTER
6/8/2018       RE: Tisha Arias  [address] [place] [address]     Dear Colleague,    Thank you for referring your patient, Tisha Arias, to the [facility] at [facility]. Please see a copy of my visit note below.    Palliative Staff/Outpatient Clinic    (This note was transcribed using voice recognition software. While I review and edit the transcription, I may miss errors, and the software sometimes does unexpected capitalizations and formatting that I miss. Please let me know of any serious mistranscriptions and I will addend this note.)    CC/Patient ID:    History:  She is accompanied today by her psychotherapist, Darcy    She is through her chemotherapy and her lymphoma is in complete remission.  However she notes that her pain which started and escalated last summer and her back in her sternum has not improved or changed at all she says.  She tells me that her pain is somewhat better because the opioids were escalated but does not feel like her chest or back pain improved with lymphoma treatment.  Of course her long-standing anterior chest wall myofascial pain is not improved but one would not have expected it to be.     She takes 120 mg of MS Contin a day and 30 mg of oxycodone pretty much every 4 hours.  Her pain is severe with this but adequately controlled.  Denies side effects including cognitive. Care teams here have intermittently observed her to appear sedated. She continues to use Voltaren gel and lidocaine gel.  Going to see a plastic surgeon about getting her breast implants removed as has been discussed for years and she is again thinking about that.    She reports widespread joint pain that has happened after her chemotherapy    She says her management at her work is being very supportive of her needing doctor's appointments but her team has not and that has been very stressful for her.      SH: Reviewed and  "unchanged    ROS: 5 system ROS o/w negative    PE: BP (!) 141/92  Pulse 121  Temp 98.4  F (36.9  C) (Oral)  Resp 16  Ht 1.651 m (5' 5\")  Wt 82.3 kg (181 lb 7 oz)  SpO2 97%  BMI 30.19 kg/m2   Wt Readings from Last 3 Encounters:   06/08/18 82.3 kg (181 lb 7 oz)   05/29/18 84 kg (185 lb 1.6 oz)   05/29/18 84 kg (185 lb 1.6 oz)     Alert NAD, comfortable appearing F  Affect flat, irritable at times; clear sensorium    Impression & Recommendations:  57-year-old woman with chronic myofascial chest wall pain after breast cancer treatment as well as back and sternal pain from a lymphoma which is not improved after lymphoma treatment.  Long-term opioid use which was escalated in the context of her acute cancer pain and notably her cancer pain has not improved despite complete resolution of her lymphoma.  When I met with her last I said we were going to taper her medicines and asked her to think and prepare for that.  She essentially tells me that she is not prepared for that and really is unwilling to do that and is going to try to see a different doctor if I insist on tapering.  I had another extensive discussion with her about opioid hyperalgesia and that I do not think it is in her best interest to be on long term high-dose opioids rest of her life & that we were willing to escalate her opioids due to her new tumor but that the plan which we discussed with her several times was always to taper them.     She suggested I do not believe or understand what is like living with chronic pain and that is why I want to taper opioids and I told her that I do believe she is in significant pain and that is however a very different question from whether long-term high-dose opioids are indicated or in her best interest.  I do not think she believes or accepts my perspective and has suggested that she is going to see someone else if I taper her.  Her psychotherapist was with her today and asks whether I would Hold off on the " tapering until she sees a plastic surgeon which I am willing to do for 2 months.  I will also observe however that this is been discussed, seeing a plastic surgeon, for years and she has not done anything about it and still has not.    Follow-up in 3 months.    Chart data reviewed today:    Family History   Problem Relation Age of Onset     Allergies Mother      Arthritis Mother      CANCER Mother      uterine/uterine     Hypertension Mother      on HCTZ     Hyperlipidemia Mother      CEREBROVASCULAR DISEASE Mother      s/p brain surgery     Breast Cancer Mother      Depression Mother      Anxiety Disorder Mother      Anesthesia Reaction Mother      Asthma Mother      Skin Cancer Mother      HEART DISEASE Father      DIABETES Father      Coronary Artery Disease Father      Hypertension Father      Hyperlipidemia Father      CEREBROVASCULAR DISEASE Father      Multiple strokes     Obesity Father      DIABETES Brother      Depression Brother      Hypertension Brother      CANCER Maternal Grandfather      pancreatic cancer/stomach cancer     Prostate Cancer Maternal Grandfather      Prostrate and Bladder     Other Cancer Maternal Grandfather      Pancreatic 1988, Bladder 1977     CANCER Maternal Grandmother      uterine     Breast Cancer Maternal Grandmother      Skin Cancer Maternal Grandmother      CANCER Brother 57     pancreatic cancer     DIABETES Brother      Breast Cancer Cousin      Grand mothers sister     Other Cancer Brother      Stage 3 Pancreatic 2-2015     Depression Brother      Asthma Brother      Obesity Brother      Anesthesia Reaction Other      H/a, itching, drug interactions     Asthma Other      CANCER Brother      Pancreatic      Thyroid Disease No family hx of      Past Medical History:   Diagnosis Date     Allergic rhinitis due to animal dander      Asthma      Breast cancer (H) 1/30/12    right     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 No real need for Epipen for this alone.     GDM (gestational diabetes mellitus)     w first pregnancy only     Gestational hypertension      Lymphedema     jackson arms, chest     Migraine      Neuropathy associated with cancer (H)      Papillary carcinoma, follicular variant (H) 2013    pT3, N1, Mx, thyroid     Post-surgical hypothyroidism      Renal disease     kidney stones     Rhinitis, allergic to other allergen      Right Breast mass and microcalcifications 2011     Seasonal allergic conjunctivitis      Seasonal allergic rhinitis 11 skin tests pos. for: dog/M/T/G/W--NEGATIVE FOOD TESTS FOR: shrimp, crab, lobster, coconut     Past Surgical History:   Procedure Laterality Date     BACK SURGERY  ,    Bulging disc w nerve root impigment     BIOPSY BREAST      right     BIOPSY BREAST  11    right, core and sterotactic     BONE MARROW BIOPSY, BONE SPECIMEN, NEEDLE/TROCAR Left 10/3/2017    Procedure: BIOPSY BONE MARROW;  Bone Marrow Biopsy with aspirate;  Surgeon: Amelia Watkins PA-C;  [facility]     BYPASS GASTRIC, CHOLECYSTECTOMY, COMBINED       C LAPAROSCOPIC GASTRIC RESTRICTIVE PX, W/GASTRIC BYPASS/ GAMALIEL-EN-Y, < 150CM      Gamaliel en Y?      SECTION       COLONOSCOPY      normal     COSMETIC SURGERY  2012    Final Stage of Mastectomy     DAVINCI HYSTERECTOMY TOTAL, BILATERAL SALPINGO-OOPHORECTOMY, COMBINED  2012    Procedure: COMBINED DAVINCI HYSTERECTOMY TOTAL, SALPINGO-OOPHORECTOMY;  Davinci Total Laparoscopic Hysterectomy, Bilateral Salpingo Oophorectomy, Pelvic Washings, Cystoscopy;  Surgeon: Evie Sheikh MD;  Location: [facility]     ENT SURGERY       ESOPHAGOSCOPY, GASTROSCOPY, DUODENOSCOPY (EGD), COMBINED N/A 2017    Procedure: COMBINED ENDOSCOPIC ULTRASOUND, ESOPHAGOSCOPY, GASTROSCOPY, DUODENOSCOPY  (EGD), FINE NEEDLE ASPIRATE/BIOPSY;  Esophagogastroduodenoscopy, Endoscopic Ultrasound, with fine needle biopsy aspirate;  Surgeon: Patrick Robles MD;  [facility]: [facility]     GI SURGERY  11-    Rachael-en Y w cholecystectomy     GYN SURGERY  1/6/89,1/10/92    2 c-sections     HYSTERECTOMY, PAP NO LONGER INDICATED  12/12    DeVinci assister lap hyst with BSO     INSERT PORT VASCULAR ACCESS Right 10/4/2017    Procedure: INSERT PORT VASCULAR ACCESS;  Port Placement;  Surgeon: Jc Goodman PA-C;  [facility]     IRRIGATION AND DEBRIDEMENT BREAST  3/1/2012    Procedure:IRRIGATION AND DEBRIDEMENT BREAST; Irrigation and Debridement, Wound Closure Right Breast; Surgeon:JUNG GUILLEN; [facility]     MASTECTOMY, RECONSTRUCT BREAST, COMBINED  1/30/2012    Procedure:COMBINED MASTECTOMY, RECONSTRUCT BREAST; Bilateral Mastectomies, Right Axillary [place] Node Biopsy, Bilateral Breast Reconstruction with Tissue Expanders, Reconstruction of inframammary fold, bilateral pain management systems; Surgeon:ANUPAM CAMPBELL; [facility]     RECONSTRUCT BREAST  8/31/2012    Procedure: RECONSTRUCT BREAST;  Bilateral 2nd stage breast reconstruction, revision,       SOFT TISSUE SURGERY  1-30-12    Mastectomy w severe myofascial pain syndrome     THYROIDECTOMY  7/10/2013    Procedure: THYROIDECTOMY;  Total Thyroidectomy with central neck dissection;  Surgeon: Indiana Sneed MD;  [facility]     TONSILLECTOMY      childhood     Allergies   Allergen Reactions     Baclofen Other (See Comments) and Unknown     Other reaction(s): Edema, chest pain, seizures.      No Clinical Screening - See Comments Shortness Of Breath, Palpitations, Anaphylaxis, Itching, Swelling, Difficulty breathing and Rash     Sukhjinder wipes- oral allergy -  July 2015: throat tightness from a Chinese herbal medicine Wilmer Barry Tran     Serotonin Anxiety, Other (See Comments) and Swelling     Seizures      Tree Nuts [Nuts]  Shortness Of Breath     Amitriptyline Hcl Swelling     Birch Trees      Potatoes, carrots, cherries, celery, apple, pears, plums, peaches, parsnip, kiwi, hazelnuts, and apricots,      Blue Dyes Itching     Headaches       Cymbalta Other (See Comments)     Flushing, tremor/muscle twitching and edema     Gabapentin Other (See Comments)     edema  Systemic edema, weaned off from Feb to March per Dr. Dowd.    edema     Grass      Mugwort [Artemisia Vulgaris]      Various spices     Ragweeds      Melons, bananas, cucumbers, zucchini.     Topamax      Nortriptyline Itching, Visual Disturbance, Swelling, GI Disturbance, Anxiety, Other (See Comments) and Nausea     Other reaction(s): Swelling   *       Medications:   I have reviewed this patient's medication profile.  MNPMP: reviewed      Data reviewed:  I reviewed recent labs and imaging, comments on pertinent findings:  Imaging shows CR lymphoma    Again, thank you for allowing me to participate in the care of your patient.      Sincerely,    Edu Benitez MD

## 2018-06-08 NOTE — NURSING NOTE
"Oncology Rooming Note    June 8, 2018 12:13 PM   Tisha Arias is a 57 year old female who presents for:    Chief Complaint   Patient presents with     Oncology Clinic Visit     Return: Palliative     Initial Vitals: BP (!) 141/92  Pulse 121  Temp 98.4  F (36.9  C) (Oral)  Resp 16  Ht 1.651 m (5' 5\")  Wt 82.3 kg (181 lb 7 oz)  SpO2 97%  BMI 30.19 kg/m2 Estimated body mass index is 30.19 kg/(m^2) as calculated from the following:    Height as of this encounter: 1.651 m (5' 5\").    Weight as of this encounter: 82.3 kg (181 lb 7 oz). Body surface area is 1.94 meters squared.  Extreme Pain (8) Comment: chest, hips   No LMP recorded. Patient has had a hysterectomy.  Allergies reviewed: Yes  Medications reviewed: Yes    Medications: MEDICATION REFILLS NEEDED TODAY. Provider was notified. pain meds and Robaxin   Pharmacy name entered into [facility]:    [id number] [place], [place] - [address] [facility] [place] [place]. [facility] [place] [place] - [address] [place] [address]    Clinical concerns: no new concerns today Dr. Benitez was notified.    10 minutes for nursing intake (face to face time)     Lashonda Talbert CMA              "

## 2018-06-08 NOTE — PROGRESS NOTES
Returned call to patient with request to speak to Dr Crawley before seeing Dr Benitez today 11:00 today. Paged Dr Crawley as patient is requesting.  Answered all patient's questions and verbalized understanding. Seema Chadwick RN, BSN.

## 2018-06-08 NOTE — PROGRESS NOTES
"Palliative Staff/Outpatient Clinic    (This note was transcribed using voice recognition software. While I review and edit the transcription, I may miss errors, and the software sometimes does unexpected capitalizations and formatting that I miss. Please let me know of any serious mistranscriptions and I will addend this note.)    CC/Patient ID:    History:  She is accompanied today by her psychotherapist, Darcy    She is through her chemotherapy and her lymphoma is in complete remission.  However she notes that her pain which started and escalated last summer and her back in her sternum has not improved or changed at all she says.  She tells me that her pain is somewhat better because the opioids were escalated but does not feel like her chest or back pain improved with lymphoma treatment.  Of course her long-standing anterior chest wall myofascial pain is not improved but one would not have expected it to be.     She takes 120 mg of MS Contin a day and 30 mg of oxycodone pretty much every 4 hours.  Her pain is severe with this but adequately controlled.  Denies side effects including cognitive. Care teams here have intermittently observed her to appear sedated. She continues to use Voltaren gel and lidocaine gel.  Going to see a plastic surgeon about getting her breast implants removed as has been discussed for years and she is again thinking about that.    She reports widespread joint pain that has happened after her chemotherapy    She says her management at her work is being very supportive of her needing doctor's appointments but her team has not and that has been very stressful for her.      SH: Reviewed and unchanged    ROS: 5 system ROS o/w negative    PE: BP (!) 141/92  Pulse 121  Temp 98.4  F (36.9  C) (Oral)  Resp 16  Ht 1.651 m (5' 5\")  Wt 82.3 kg (181 lb 7 oz)  SpO2 97%  BMI 30.19 kg/m2   Wt Readings from Last 3 Encounters:   06/08/18 82.3 kg (181 lb 7 oz)   05/29/18 84 kg (185 lb 1.6 oz) "   05/29/18 84 kg (185 lb 1.6 oz)     Alert NAD, comfortable appearing F  Affect flat, irritable at times; clear sensorium    Impression & Recommendations:  57-year-old woman with chronic myofascial chest wall pain after breast cancer treatment as well as back and sternal pain from a lymphoma which is not improved after lymphoma treatment.  Long-term opioid use which was escalated in the context of her acute cancer pain and notably her cancer pain has not improved despite complete resolution of her lymphoma.  When I met with her last I said we were going to taper her medicines and asked her to think and prepare for that.  She essentially tells me that she is not prepared for that and really is unwilling to do that and is going to try to see a different doctor if I insist on tapering.  I had another extensive discussion with her about opioid hyperalgesia and that I do not think it is in her best interest to be on long term high-dose opioids rest of her life & that we were willing to escalate her opioids due to her new tumor but that the plan which we discussed with her several times was always to taper them.     She suggested I do not believe or understand what is like living with chronic pain and that is why I want to taper opioids and I told her that I do believe she is in significant pain and that is however a very different question from whether long-term high-dose opioids are indicated or in her best interest.  I do not think she believes or accepts my perspective and has suggested that she is going to see someone else if I taper her.  Her psychotherapist was with her today and asks whether I would Hold off on the tapering until she sees a plastic surgeon which I am willing to do for 2 months.  I will also observe however that this is been discussed, seeing a plastic surgeon, for years and she has not done anything about it and still has not.    Follow-up in 3 months.    Chart data reviewed today:    Family  History   Problem Relation Age of Onset     Allergies Mother      Arthritis Mother      CANCER Mother      uterine/uterine     Hypertension Mother      on HCTZ     Hyperlipidemia Mother      CEREBROVASCULAR DISEASE Mother      s/p brain surgery     Breast Cancer Mother      Depression Mother      Anxiety Disorder Mother      Anesthesia Reaction Mother      Asthma Mother      Skin Cancer Mother      HEART DISEASE Father      DIABETES Father      Coronary Artery Disease Father      Hypertension Father      Hyperlipidemia Father      CEREBROVASCULAR DISEASE Father      Multiple strokes     Obesity Father      DIABETES Brother      Depression Brother      Hypertension Brother      CANCER Maternal Grandfather      pancreatic cancer/stomach cancer     Prostate Cancer Maternal Grandfather      Prostrate and Bladder     Other Cancer Maternal Grandfather      Pancreatic 1988, Bladder 1977     CANCER Maternal Grandmother      uterine     Breast Cancer Maternal Grandmother      Skin Cancer Maternal Grandmother      CANCER Brother 57     pancreatic cancer     DIABETES Brother      Breast Cancer Cousin      Grand mothers sister     Other Cancer Brother      Stage 3 Pancreatic 2-2015     Depression Brother      Asthma Brother      Obesity Brother      Anesthesia Reaction Other      H/a, itching, drug interactions     Asthma Other      CANCER Brother      Pancreatic      Thyroid Disease No family hx of      Past Medical History:   Diagnosis Date     Allergic rhinitis due to animal dander      Asthma      Breast cancer (H) 1/30/12    right     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 No real need for Epipen for this alone.     GDM (gestational diabetes mellitus)     w first pregnancy only     Gestational hypertension      Lymphedema     jackson arms,  chest     Migraine      Neuropathy associated with cancer (H)      Papillary carcinoma, follicular variant (H) 2013    pT3, N1, Mx, thyroid     Post-surgical hypothyroidism      Renal disease     kidney stones     Rhinitis, allergic to other allergen      Right Breast mass and microcalcifications 2011     Seasonal allergic conjunctivitis      Seasonal allergic rhinitis 11 skin tests pos. for: dog/M/T/G/W--NEGATIVE FOOD TESTS FOR: shrimp, crab, lobster, coconut     Past Surgical History:   Procedure Laterality Date     BACK SURGERY  ,    Bulging disc w nerve root impigment     BIOPSY BREAST      right     BIOPSY BREAST  11    right, core and sterotactic     BONE MARROW BIOPSY, BONE SPECIMEN, NEEDLE/TROCAR Left 10/3/2017    Procedure: BIOPSY BONE MARROW;  Bone Marrow Biopsy with aspirate;  Surgeon: Amelia Watkins PA-C;  [facility]     BYPASS GASTRIC, CHOLECYSTECTOMY, COMBINED       C LAPAROSCOPIC GASTRIC RESTRICTIVE PX, W/GASTRIC BYPASS/ GAMALIEL-EN-Y, < 150CM      Gamaliel en Y?      SECTION       COLONOSCOPY      normal     COSMETIC SURGERY  2012    Final Stage of Mastectomy     DAVINCI HYSTERECTOMY TOTAL, BILATERAL SALPINGO-OOPHORECTOMY, COMBINED  2012    Procedure: COMBINED DAVINCI HYSTERECTOMY TOTAL, SALPINGO-OOPHORECTOMY;  Davinci Total Laparoscopic Hysterectomy, Bilateral Salpingo Oophorectomy, Pelvic Washings, Cystoscopy;  Surgeon: Evie Sheikh MD;  Location: [facility]     ENT SURGERY       ESOPHAGOSCOPY, GASTROSCOPY, DUODENOSCOPY (EGD), COMBINED N/A 2017    Procedure: COMBINED ENDOSCOPIC ULTRASOUND, ESOPHAGOSCOPY, GASTROSCOPY, DUODENOSCOPY (EGD), FINE NEEDLE ASPIRATE/BIOPSY;  Esophagogastroduodenoscopy, Endoscopic Ultrasound, with fine needle biopsy aspirate;  Surgeon: Patrick Robles MD;  [facility]: [facility]     GI SURGERY  2007    Gamaliel-en Y w cholecystectomy     GYN SURGERY  89,1/10/92    2 c-sections     HYSTERECTOMY,  PAP NO LONGER INDICATED  12/12    DeVinci assister lap hyst with BSO     INSERT PORT VASCULAR ACCESS Right 10/4/2017    Procedure: INSERT PORT VASCULAR ACCESS;  Port Placement;  Surgeon: Jc Goodman PA-C;  [facility]     IRRIGATION AND DEBRIDEMENT BREAST  3/1/2012    Procedure:IRRIGATION AND DEBRIDEMENT BREAST; Irrigation and Debridement, Wound Closure Right Breast; Surgeon:JUNG GUILLEN; [facility]     MASTECTOMY, RECONSTRUCT BREAST, COMBINED  1/30/2012    Procedure:COMBINED MASTECTOMY, RECONSTRUCT BREAST; Bilateral Mastectomies, Right Axillary [place] Node Biopsy, Bilateral Breast Reconstruction with Tissue Expanders, Reconstruction of inframammary fold, bilateral pain management systems; Surgeon:ANUPAM CAMPBELL; [facility]     RECONSTRUCT BREAST  8/31/2012    Procedure: RECONSTRUCT BREAST;  Bilateral 2nd stage breast reconstruction, revision,       SOFT TISSUE SURGERY  1-30-12    Mastectomy w severe myofascial pain syndrome     THYROIDECTOMY  7/10/2013    Procedure: THYROIDECTOMY;  Total Thyroidectomy with central neck dissection;  Surgeon: Indiana Sneed MD;  [facility]     TONSILLECTOMY      childhood     Allergies   Allergen Reactions     Baclofen Other (See Comments) and Unknown     Other reaction(s): Edema, chest pain, seizures.      No Clinical Screening - See Comments Shortness Of Breath, Palpitations, Anaphylaxis, Itching, Swelling, Difficulty breathing and Rash     Sukhjinder wipes- oral allergy -  July 2015: throat tightness from a Chinese herbal medicine Wilmer Tran     Serotonin Anxiety, Other (See Comments) and Swelling     Seizures      Tree Nuts [Nuts] Shortness Of Breath     Amitriptyline Hcl Swelling     Birch Trees      Potatoes, carrots, cherries, celery, apple, pears, plums, peaches, parsnip, kiwi, hazelnuts, and apricots,      Blue Dyes Itching     Headaches       Cymbalta Other (See Comments)     Flushing, tremor/muscle twitching and edema      Gabapentin Other (See Comments)     edema  Systemic edema, weaned off from Feb to March per Dr. Dowd.    edema     Grass      Mugwort [Artemisia Vulgaris]      Various spices     Ragweeds      Melons, bananas, cucumbers, zucchini.     Topamax      Nortriptyline Itching, Visual Disturbance, Swelling, GI Disturbance, Anxiety, Other (See Comments) and Nausea     Other reaction(s): Swelling   *       Medications:   I have reviewed this patient's medication profile.  MNPMP: reviewed      Data reviewed:  I reviewed recent labs and imaging, comments on pertinent findings:  Imaging shows CR lymphoma    Thank you for involving us in the patient's care. 25' face time over half counseling  Edu Benitez MD / Palliative Medicine / Pager [phone] / [facility] Inpatient Team Consult Pager [phone] (answered 8am-430pm M-F) - ok to text page via [employer] / After-Hours Answering Service [phone] / Palliative Clinic in the [facility] at the [facility] - [phone] (scheduling); [phone] (triage).

## 2018-06-14 DIAGNOSIS — R11.2 NAUSEA AND VOMITING, INTRACTABILITY OF VOMITING NOT SPECIFIED, UNSPECIFIED VOMITING TYPE: ICD-10-CM

## 2018-06-14 DIAGNOSIS — C85.10 HIGH GRADE B-CELL LYMPHOMA (H): ICD-10-CM

## 2018-06-14 DIAGNOSIS — C50.919 BREAST CANCER (H): ICD-10-CM

## 2018-06-15 RX ORDER — ONDANSETRON 8 MG/1
TABLET, ORALLY DISINTEGRATING ORAL
Qty: 30 TABLET | Refills: 3 | Status: SHIPPED | OUTPATIENT
Start: 2018-06-15 | End: 2019-02-18

## 2018-07-10 ENCOUNTER — MYC REFILL (OUTPATIENT)
Dept: PALLIATIVE CARE | Facility: CLINIC | Age: 58
End: 2018-07-10

## 2018-07-10 DIAGNOSIS — T78.2XXD ANAPHYLAXIS, SUBSEQUENT ENCOUNTER: Primary | ICD-10-CM

## 2018-07-11 RX ORDER — EPINEPHRINE 0.3 MG/.3ML
0.3 INJECTION SUBCUTANEOUS
Qty: 0.6 ML | Refills: 3 | Status: SHIPPED | OUTPATIENT
Start: 2018-07-11 | End: 2021-10-04

## 2018-07-11 NOTE — TELEPHONE ENCOUNTER
Received mychart from patient requesting refill of epi-pen.     Last refill: 7/12/2017  Last office visit: 6/8/2018  Scheduled for follow up 8/29/2018    Will route request to MD for review.     Reviewed [place]  Report.

## 2018-07-11 NOTE — TELEPHONE ENCOUNTER
Message from [employer]:  Original authorizing provider: MD Elvin Rangel TAMIRJonatan Arias would like a refill of the following medications:  EPINEPHrine (EPIPEN 2-CARIDAD) 0.3 MG/0.3ML injection [Edu Benitez MD]    Preferred pharmacy: [id number] - [place], [place] - [address] [facility] [place]    Comment:  Please fill this prescription as soon as possible. I was required to use my employers supply yesterday

## 2018-07-14 ENCOUNTER — TELEPHONE (OUTPATIENT)
Dept: PALLIATIVE CARE | Facility: CLINIC | Age: 58
End: 2018-07-14

## 2018-07-14 NOTE — TELEPHONE ENCOUNTER
[place] [facility] [place] Edward alerting me to issues with [place]'s opioid prescriptions (MS Contin and oxycodone).  Prescription for MS Contin signed by Dr. Benitez in the computer but hard copy prescription not signed.     Also, the patient's prior authorization for oxycodone at current dosing has now  and the patient who typically takes 6 oxycodone tabs/day will only be able to receive 4 tabs per day pending new prior authorization approval.     I have confirmed Dr. Benitez's plan for continued MS Contin at current dosing and Edward will release an emergency prescription with plan for Dr. Benitez to sign the paper prescription first thing Monday morning, and also have given verbal approval to change oxycodone script to max of 4 tabs per day so that the patient can collect this medication today.     Dr. Benitez to sign the MS Contin prescription at the [facility] [place] Monday morning, clinic RN to address need for new prior authorization on Tuesday.     Teresita Vargas MD  Palliative Medicine on-call physician

## 2018-07-20 ENCOUNTER — TELEPHONE (OUTPATIENT)
Dept: PALLIATIVE CARE | Facility: CLINIC | Age: 58
End: 2018-07-20

## 2018-07-20 NOTE — TELEPHONE ENCOUNTER
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    Patient has been on this medication, at this dose since 11/2017, this is a reauthorization request.     Insurance Name:  [employer]  Insurance ID:  [id number]      Pharmacy Information (if different than what is on RX)  Name:  [facility]  Phone:  [phone]

## 2018-07-23 NOTE — TELEPHONE ENCOUNTER
No P/A is needed. Called insurance to be sure it wasn't needed for a Quantity Limit. There are no limitations on the coverage of this medication. Pharmacy may get a DUR reject, and at that time they would just need to call their pharmacy help desk [phone]. Spoke to Carmen at the pharmacy and relayed this information.

## 2018-07-27 DIAGNOSIS — G89.3 NEOPLASM RELATED PAIN: ICD-10-CM

## 2018-07-27 DIAGNOSIS — C85.10 HIGH GRADE B-CELL LYMPHOMA (H): ICD-10-CM

## 2018-07-27 RX ORDER — MORPHINE SULFATE 30 MG/1
30 TABLET, FILM COATED, EXTENDED RELEASE ORAL EVERY 8 HOURS
Qty: 120 TABLET | Refills: 0 | Status: SHIPPED | OUTPATIENT
Start: 2018-08-11 | End: 2018-08-29

## 2018-07-27 RX ORDER — OXYCODONE HYDROCHLORIDE 30 MG/1
30 TABLET ORAL EVERY 4 HOURS PRN
Qty: 180 TABLET | Refills: 0 | Status: SHIPPED | OUTPATIENT
Start: 2018-08-11 | End: 2018-08-29

## 2018-07-27 RX ORDER — OXYCODONE HYDROCHLORIDE 30 MG/1
30 TABLET ORAL EVERY 4 HOURS PRN
Qty: 60 TABLET | Refills: 0 | Status: SHIPPED | OUTPATIENT
Start: 2018-08-01 | End: 2018-10-03

## 2018-07-27 NOTE — TELEPHONE ENCOUNTER
"Received VM from patient - she would like her remaining #20 tabs that she is missing from her last oxycodone fill (see previous notes). She also would like to request to August refills so she doesn't need to call back again - she states she does not want any changes to be made to these medications. She asks me to call her back with an update on her work phone, but notes not to say the name of her medications as the line is recorded.     Last oxycodone fill was 7/14/2018, for #120 tabs - patient \"owed\" #60 tabs to be at her usual supply (180 tabs) 120 tabs = 20 day supply. Spoke with with pharmacy, they do anticipate this will be approved by insurance, however she is unable to get this refill until next week, Wednesday August 1.    July refills: 7/14/2018  Last office visit: 6/8/2018  Scheduled for follow up 8/29/2018    Will route to MD for review.     [place]  Report reviewed.       **Called patient and advised of forward dates on scripts and that they would be dropped at pharmacy. She verbalized understanding and agreement.  "

## 2018-07-29 DIAGNOSIS — R07.89 CHEST WALL PAIN: ICD-10-CM

## 2018-08-01 RX ORDER — DIAZEPAM 5 MG
TABLET ORAL
Qty: 90 TABLET | Refills: 1 | Status: SHIPPED | OUTPATIENT
Start: 2018-08-01 | End: 2018-10-03

## 2018-08-01 NOTE — TELEPHONE ENCOUNTER
Received request from pharmacy requesting refill of diazepam.     Last refill: 5/25/2018  Last office visit: 6/8/2018  Scheduled for follow up 8/29/2018     Will route request to MD for review.     Reviewed [place]  Report.

## 2018-08-03 DIAGNOSIS — D89.42 IDIOPATHIC MAST CELL ACTIVATION SYNDROME (H): Chronic | ICD-10-CM

## 2018-08-03 RX ORDER — MONTELUKAST SODIUM 10 MG/1
20 TABLET ORAL 2 TIMES DAILY
Qty: 120 TABLET | Refills: 11 | Status: SHIPPED | OUTPATIENT
Start: 2018-08-03 | End: 2019-03-20

## 2018-08-05 DIAGNOSIS — C73 PAPILLARY CARCINOMA, FOLLICULAR VARIANT (H): ICD-10-CM

## 2018-08-05 DIAGNOSIS — E89.2 POSTSURGICAL HYPOPARATHYROIDISM (H): ICD-10-CM

## 2018-08-05 DIAGNOSIS — C73 THYROID CANCER (H): ICD-10-CM

## 2018-08-05 DIAGNOSIS — E89.0 POSTSURGICAL HYPOTHYROIDISM: ICD-10-CM

## 2018-08-08 RX ORDER — LEVOTHYROXINE SODIUM 112 UG/1
TABLET ORAL
Qty: 189 TABLET | Refills: 3 | Status: SHIPPED | OUTPATIENT
Start: 2018-08-08 | End: 2019-08-22

## 2018-08-08 NOTE — TELEPHONE ENCOUNTER
Levothyroxine  112 MCG  Last Written Prescription Date:  9/7/17  Last Fill Quantity: 189,   # refills: 3  Last Office Visit : 5/21/18  Future Office visit:  11/26/18    Routing refill request to provider for review/approval   ABN   TSH

## 2018-08-09 DIAGNOSIS — E87.6 HYPOKALEMIA: ICD-10-CM

## 2018-08-15 RX ORDER — POTASSIUM CHLORIDE 1500 MG/1
TABLET, EXTENDED RELEASE ORAL
Qty: 60 TABLET | Refills: 2 | OUTPATIENT
Start: 2018-08-15

## 2018-08-29 ENCOUNTER — OFFICE VISIT (OUTPATIENT)
Dept: PALLIATIVE CARE | Facility: CLINIC | Age: 58
End: 2018-08-29
Attending: INTERNAL MEDICINE
Payer: COMMERCIAL

## 2018-08-29 VITALS
WEIGHT: 188.7 LBS | HEART RATE: 80 BPM | RESPIRATION RATE: 16 BRPM | HEIGHT: 65 IN | SYSTOLIC BLOOD PRESSURE: 139 MMHG | BODY MASS INDEX: 31.44 KG/M2 | TEMPERATURE: 98.8 F | OXYGEN SATURATION: 98 % | DIASTOLIC BLOOD PRESSURE: 81 MMHG

## 2018-08-29 DIAGNOSIS — Z79.891 ENCOUNTER FOR LONG-TERM OPIATE ANALGESIC USE: ICD-10-CM

## 2018-08-29 DIAGNOSIS — G89.28 OTHER CHRONIC POSTPROCEDURAL PAIN: Primary | ICD-10-CM

## 2018-08-29 DIAGNOSIS — G89.3 NEOPLASM RELATED PAIN: ICD-10-CM

## 2018-08-29 DIAGNOSIS — G43.519 INTRACTABLE PERSISTENT MIGRAINE AURA WITHOUT CEREBRAL INFARCTION AND WITHOUT STATUS MIGRAINOSUS: ICD-10-CM

## 2018-08-29 DIAGNOSIS — R11.2 NAUSEA AND VOMITING, INTRACTABILITY OF VOMITING NOT SPECIFIED, UNSPECIFIED VOMITING TYPE: ICD-10-CM

## 2018-08-29 DIAGNOSIS — C85.10 HIGH GRADE B-CELL LYMPHOMA (H): ICD-10-CM

## 2018-08-29 PROCEDURE — 99214 OFFICE O/P EST MOD 30 MIN: CPT | Performed by: INTERNAL MEDICINE

## 2018-08-29 PROCEDURE — G0463 HOSPITAL OUTPT CLINIC VISIT: HCPCS | Mod: ZF

## 2018-08-29 RX ORDER — MORPHINE SULFATE 30 MG/1
30 TABLET, FILM COATED, EXTENDED RELEASE ORAL EVERY 8 HOURS
Qty: 120 TABLET | Refills: 0 | Status: SHIPPED | OUTPATIENT
Start: 2018-09-10 | End: 2018-10-03

## 2018-08-29 RX ORDER — OXYCODONE HYDROCHLORIDE 30 MG/1
30 TABLET ORAL EVERY 4 HOURS PRN
Qty: 162 TABLET | Refills: 0 | Status: SHIPPED | OUTPATIENT
Start: 2018-08-29 | End: 2018-08-29

## 2018-08-29 RX ORDER — OXYCODONE HYDROCHLORIDE 30 MG/1
30 TABLET ORAL EVERY 4 HOURS PRN
Qty: 162 TABLET | Refills: 0 | Status: SHIPPED | OUTPATIENT
Start: 2018-09-10 | End: 2018-10-03

## 2018-08-29 RX ORDER — ONDANSETRON 8 MG/1
8 TABLET, ORALLY DISINTEGRATING ORAL EVERY 8 HOURS PRN
Qty: 30 TABLET | Refills: 1 | Status: SHIPPED | OUTPATIENT
Start: 2018-08-29 | End: 2019-03-20

## 2018-08-29 RX ORDER — SUMATRIPTAN 50 MG/1
50 TABLET, FILM COATED ORAL PRN
Qty: 30 TABLET | Refills: 3 | Status: SHIPPED | OUTPATIENT
Start: 2018-08-29 | End: 2019-03-20

## 2018-08-29 RX ORDER — OLOPATADINE HYDROCHLORIDE 1 MG/ML
1 SOLUTION/ DROPS OPHTHALMIC
COMMUNITY
End: 2022-10-13

## 2018-08-29 ASSESSMENT — PAIN SCALES - GENERAL: PAINLEVEL: EXTREME PAIN (9)

## 2018-08-29 NOTE — PROGRESS NOTES
Palliative Care Outpatient Clinic    (This note was transcribed using voice recognition software. While I review and edit the transcription, I may miss errors, and the software sometimes does unexpected capitalizations and formatting that I miss. Please let me know of any serious mistranscriptions and I will addend this note.)    Patient ID:  Patient ID: she has long-standing chronic complex myofascial chest wall pain after breast cancer surgery and reconstruction   Saw Dr England (at my insistence) earlier in 2016 for a 2nd opinion - he recommended modest opioid increases which we did and it only seemed to improve her situation temporarily  Mood disturbance in this context   Follows closely also with acupuncture, massage, & psychotherapy.   Is diagnosed also with MCAS and was followed by Dr Avendaño when he was here.  Thyroid cancer s/p resection, ablations.  Sept 2017 - she developed escalating sternal and back pain and had a CT in ED, then PET showing R 10th rib,T10 mass, mediastinal and hilar lymphadenopathy -->  High grade B cell lymphoma. Feb 2018 in complete radiographic remission. Opioids increased in that time to treat tumor related pain. She did not report improvement of her pain at that time.   She is a nurse, currently doing occupational health nursing at [employer] Outpatient Palliative Care Opioid Prescribing Safety Plan    Opioid Safety Education: Reviewed by Edu Benitez on 8/29/18  Opioid Risk Assessment: Performed by Edu Benitez on 8/29/18  ORT score of 1 but I consider higher risk due to long-term high dose use, episodes of appearing sedated in clinic.   Mood Disorder Assessment: Performed by Edu Benitez on 8/29/18   reviewed with every prescription, last reviewed by Edu Benitez on 8/29/18      Prognosis >1 year  Risk: Medium (ORT4-7)  Indication for Opioid Use: Moderate   Baseline and Annual UDT last performed on: 8/29/18  Naloxone Script provided if  patient also on benzodiazepine: 18   Indications for Tapering reviewed: ongoing    History:  She reports she has been having a very difficult time.  Her father and brother both  in the last couple months and obviously that has been very tough for her.  She reports in this context her pain is much worse.  She describes multiple areas of pain including her long-standing chest wall pain and tightening, severe edema pain in her feet, and worsening headaches.  She called  heat her hematology team about the headaches and they were recommended she go to the ER be evaluated and she did not do that.  She tells me she is worried her lymphoma is back and she sees her oncologist next week.  This is because of headaches.      We talk again extensively about tapering. She absolutely does not want to taper.  She suggests I do not believe how severe her pain is her understand most like to live in pain.  I try to frame for her that in the last year, since her lymphoma diagnosis, her opioid doses have been escalated into the very high range, and yet she continues to live with what she describes as severe and intractable pain in multiple areas of her body. This was a highly predictable event, and exactly what I worried about when we escalated her doses at the time of the lymphoma diagnosis. I cannot find any indication to continue high dose opioid therapy in her, and also note that when she was diagnosed with the lymphoma we informed her we would taper her once she was through her treatment. I've delayed tapering until now.  We see this happen to people all the time.  Her opiate medications are probably significantly responsible for much of the escalation in her pain all over her body.  Also today, she is reporting worse pain, in the context of a lot of grief and loss and I talked about that but I do not think she really has much insight into those connections.    SH: Reviewed, as above, unchanged.    ROS: 4 system ROS  "negative except as above    PE: /81 (BP Location: Left arm, Patient Position: Sitting, Cuff Size: Adult Regular)  Pulse 80  Temp 98.8  F (37.1  C) (Oral)  Resp 16  Ht 1.651 m (5' 5\")  Wt 85.6 kg (188 lb 11.2 oz)  SpO2 98%  BMI 31.4 kg/m2  Wt Readings from Last 3 Encounters:   06/08/18 82.3 kg (181 lb 7 oz)   05/29/18 84 kg (185 lb 1.6 oz)   05/29/18 84 kg (185 lb 1.6 oz)     Alert healthy appearing middle-aged woman in no acute distress.  Affect is flat and somewhat irritable.  Clear sensorium fluent speech normal memory thought processes.  I examine her right foot where she is reporting severe edema related pain.  I cannot appreciate any significant edema or pitting edema throughout the extremity.    Data reviewed:  I reviewed recent labs and imaging, my comments:  CT May  IMPRESSION: In this patient with a history of multiple malignancies  including diffuse large B-cell lymphoma, breast cancer, and papillary  thyroid cancer:  1. Treatment related changes in the posterior right 10th rib with  slightly increased sclerosis and minimal residual soft tissue  thickening. No evidence of disease progression or new metastatic  disease in the chest, abdomen or pelvis.  2. Slightly increased intra and extra hepatic biliary ductal  dilatation, may be reservoir effect from prior cholecystectomy,  correlate with liver function tests.  3. Bilateral nonobstructing renal calculi, largest measuring 3 mm in  the inferior pole right kidney.      Impression & Recommendations:  57-year-old woman with chronic chest wall pain and multifocal pains as above.  Extensive discussion about opioid tapering today.  We are in an impasse.  I do not think she has much insight into the fact that she continues to have severe pain despite  marked opiate escalation and does not believe me when I say that I think she will be better off on lower doses.  At the end of the day I can make her believe that but I am beginning tapering " today-reducing her oxycodone by 10% a month.  My goal is to get her back down to her doses that she was on before the lymphoma which was 30 mg MS Contin in the morning and 45 mg of MS Contin in the evening and no as needed dose.    She continues to see her psychotherapist.      Talked about the new opioid safety policies in clinic-naloxone prescription and urine drug screen today.    Chart data reviewed today:      Family History   Problem Relation Age of Onset     Allergies Mother      Arthritis Mother      Cancer Mother      uterine/uterine     Hypertension Mother      on HCTZ     Hyperlipidemia Mother      Cerebrovascular Disease Mother      s/p brain surgery     Breast Cancer Mother      Depression Mother      Anxiety Disorder Mother      Anesthesia Reaction Mother      Asthma Mother      Skin Cancer Mother      HEART DISEASE Father      Diabetes Father      Coronary Artery Disease Father      Hypertension Father      Hyperlipidemia Father      Cerebrovascular Disease Father      Multiple strokes     Obesity Father      Diabetes Brother      Depression Brother      Hypertension Brother      Cancer Maternal Grandfather      pancreatic cancer/stomach cancer     Prostate Cancer Maternal Grandfather      Prostrate and Bladder     Other Cancer Maternal Grandfather      Pancreatic 1988, Bladder 1977     Cancer Maternal Grandmother      uterine     Breast Cancer Maternal Grandmother      Skin Cancer Maternal Grandmother      Cancer Brother 57     pancreatic cancer     Diabetes Brother      Breast Cancer Cousin      Grand mothers sister     Other Cancer Brother      Stage 3 Pancreatic 2-2015     Depression Brother      Asthma Brother      Obesity Brother      Anesthesia Reaction Other      H/a, itching, drug interactions     Asthma Other      Cancer Brother      Pancreatic      Thyroid Disease No family hx of      Past Medical History:   Diagnosis Date     Allergic rhinitis due to animal dander      Asthma      Breast  cancer (H) 12    right     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 No real need for Epipen for this alone.     GDM (gestational diabetes mellitus)     w first pregnancy only     Gestational hypertension      Lymphedema     jackson arms, chest     Migraine      Neuropathy associated with cancer (H)      Papillary carcinoma, follicular variant (H) 2013    pT3, N1, Mx, thyroid     Post-surgical hypothyroidism      Renal disease     kidney stones     Rhinitis, allergic to other allergen      Right Breast mass and microcalcifications 2011     Seasonal allergic conjunctivitis      Seasonal allergic rhinitis 11 skin tests pos. for: dog/M/T/G/W--NEGATIVE FOOD TESTS FOR: shrimp, crab, lobster, coconut     Past Surgical History:   Procedure Laterality Date     BACK SURGERY  ,    Bulging disc w nerve root impigment     BIOPSY BREAST      right     BIOPSY BREAST  11    right, core and sterotactic     BONE MARROW BIOPSY, BONE SPECIMEN, NEEDLE/TROCAR Left 10/3/2017    Procedure: BIOPSY BONE MARROW;  Bone Marrow Biopsy with aspirate;  Surgeon: Amelia Watkins PA-C;  Location: [facility] OR     BYPASS GASTRIC, CHOLECYSTECTOMY, COMBINED       C LAPAROSCOPIC GASTRIC RESTRICTIVE PX, W/GASTRIC BYPASS/ GAMALIEL-EN-Y, < 150CM      Gamaliel en Y?      SECTION       COLONOSCOPY      normal     COSMETIC SURGERY  2012    Final Stage of Mastectomy     DAVINCI HYSTERECTOMY TOTAL, BILATERAL SALPINGO-OOPHORECTOMY, COMBINED  2012    Procedure: COMBINED DAVINCI HYSTERECTOMY TOTAL, SALPINGO-OOPHORECTOMY;  Davinci Total Laparoscopic Hysterectomy, Bilateral Salpingo Oophorectomy, Pelvic Washings, Cystoscopy;  Surgeon: Evie Sheikh MD;  Location: [facility] OR     ENT SURGERY       ESOPHAGOSCOPY, GASTROSCOPY,  DUODENOSCOPY (EGD), COMBINED N/A 9/14/2017    Procedure: COMBINED ENDOSCOPIC ULTRASOUND, ESOPHAGOSCOPY, GASTROSCOPY, DUODENOSCOPY (EGD), FINE NEEDLE ASPIRATE/BIOPSY;  Esophagogastroduodenoscopy, Endoscopic Ultrasound, with fine needle biopsy aspirate;  Surgeon: Patrick Robles MD;  Location: [facility] OR     GI SURGERY  11-    Rachael-en Y w cholecystectomy     GYN SURGERY  1/6/89,1/10/92    2 c-sections     HYSTERECTOMY, PAP NO LONGER INDICATED  12/12    DeVinci assister lap hyst with BSO     INSERT PORT VASCULAR ACCESS Right 10/4/2017    Procedure: INSERT PORT VASCULAR ACCESS;  Port Placement;  Surgeon: Jc Goodman PA-C;  Location: [facility] OR     IRRIGATION AND DEBRIDEMENT BREAST  3/1/2012    Procedure:IRRIGATION AND DEBRIDEMENT BREAST; Irrigation and Debridement, Wound Closure Right Breast; Surgeon:JUNG GUILLEN; Location:[facility] OR     MASTECTOMY, RECONSTRUCT BREAST, COMBINED  1/30/2012    Procedure:COMBINED MASTECTOMY, RECONSTRUCT BREAST; Bilateral Mastectomies, Right Axillary Phenix Node Biopsy, Bilateral Breast Reconstruction with Tissue Expanders, Reconstruction of inframammary fold, bilateral pain management systems; Surgeon:ANUPAM CAMPBELL; Location:[facility] OR     RECONSTRUCT BREAST  8/31/2012    Procedure: RECONSTRUCT BREAST;  Bilateral 2nd stage breast reconstruction, revision,       SOFT TISSUE SURGERY  1-30-12    Mastectomy w severe myofascial pain syndrome     THYROIDECTOMY  7/10/2013    Procedure: THYROIDECTOMY;  Total Thyroidectomy with central neck dissection;  Surgeon: Indiana Sneed MD;  Location: [facility] OR     TONSILLECTOMY      childhood     Allergies   Allergen Reactions     Baclofen Other (See Comments) and Unknown     Other reaction(s): Edema, chest pain, seizures.      No Clinical Screening - See Comments Shortness Of Breath, Palpitations, Anaphylaxis, Itching, Swelling, Difficulty breathing and Rash     Sukhjinder wipes- oral allergy -  July 2015: throat tightness from a Chinese  herbal medicine Wilmer Blank Barry Tran     Serotonin Anxiety, Other (See Comments) and Swelling     Seizures      Tree Nuts [Nuts] Shortness Of Breath     Amitriptyline Hcl Swelling     Birch Trees      Potatoes, carrots, cherries, celery, apple, pears, plums, peaches, parsnip, kiwi, hazelnuts, and apricots,      Blue Dyes Itching     Headaches       Cymbalta Other (See Comments)     Flushing, tremor/muscle twitching and edema     Gabapentin Other (See Comments)     edema  Systemic edema, weaned off from Feb to March per Dr. Dowd.    edema     Grass      Mugwort [Artemisia Vulgaris]      Various spices     Ragweeds      Melons, bananas, cucumbers, zucchini.     Topamax      Nortriptyline Itching, Visual Disturbance, Swelling, GI Disturbance, Anxiety, Other (See Comments) and Nausea     Other reaction(s): Swelling     Medications: I have reviewed the patient's medication profile. MNPMP: reviewed    Thank you for involving us in the patient's care.   Edu Benitez MD / Palliative Medicine / Pager [phone] / [facility] Inpatient Team Consult Pager [phone] (answered 8am-430pm M-F) - ok to text page via [employer] / After-Hours Answering Service [phone] / Palliative Clinic in the [facility] the [facility] [place] - [phone] (scheduling); [phone] (triage).

## 2018-08-29 NOTE — LETTER
8/29/2018     RE: Tisha Arias  [address] [place] [address]     Dear Colleague,    Thank you for referring your patient, Tisha Arias, to the [facility] at [facility]. Please see a copy of my visit note below.    Palliative Care Outpatient Clinic    (This note was transcribed using voice recognition software. While I review and edit the transcription, I may miss errors, and the software sometimes does unexpected capitalizations and formatting that I miss. Please let me know of any serious mistranscriptions and I will addend this note.)    Patient ID:  Patient ID: she has long-standing chronic complex myofascial chest wall pain after breast cancer surgery and reconstruction   Saw Dr England (at my insistence) earlier in 2016 for a 2nd opinion - he recommended modest opioid increases which we did and it only seemed to improve her situation temporarily  Mood disturbance in this context   Follows closely also with acupuncture, massage, & psychotherapy.   Is diagnosed also with MCAS and was followed by Dr Avendaño when he was here.  Thyroid cancer s/p resection, ablations.  Sept 2017 - she developed escalating sternal and back pain and had a CT in ED, then PET showing R 10th rib,T10 mass, mediastinal and hilar lymphadenopathy -->  High grade B cell lymphoma. Feb 2018 in complete radiographic remission. Opioids increased in that time to treat tumor related pain. She did not report improvement of her pain at that time.   She is a nurse, currently doing occupational health nursing at [employer] Outpatient Palliative Care Opioid Prescribing Safety Plan    Opioid Safety Education: Reviewed by Edu Benitez on 8/29/18  Opioid Risk Assessment: Performed by Edu Benitez on 8/29/18  ORT score of 1 but I consider higher risk due to long-term high dose use, episodes of appearing sedated in clinic.   Mood Disorder Assessment:  Performed by Edu Benitez on 18   reviewed with every prescription, last reviewed by Edu Benitez on 18      Prognosis >1 year  Risk: Medium (ORT4-7)  Indication for Opioid Use: Moderate   Baseline and Annual UDT last performed on: 18  Naloxone Script provided if patient also on benzodiazepine: 18   Indications for Tapering reviewed: ongoing    History:  She reports she has been having a very difficult time.  Her father and brother both  in the last couple months and obviously that has been very tough for her.  She reports in this context her pain is much worse.  She describes multiple areas of pain including her long-standing chest wall pain and tightening, severe edema pain in her feet, and worsening headaches.  She called  heat her hematology team about the headaches and they were recommended she go to the ER be evaluated and she did not do that.  She tells me she is worried her lymphoma is back and she sees her oncologist next week.  This is because of headaches.      We talk again extensively about tapering. She absolutely does not want to taper.  She suggests I do not believe how severe her pain is her understand most like to live in pain.  I try to frame for her that in the last year, since her lymphoma diagnosis, her opioid doses have been escalated into the very high range, and yet she continues to live with what she describes as severe and intractable pain in multiple areas of her body. This was a highly predictable event, and exactly what I worried about when we escalated her doses at the time of the lymphoma diagnosis. I cannot find any indication to continue high dose opioid therapy in her, and also note that when she was diagnosed with the lymphoma we informed her we would taper her once she was through her treatment. I've delayed tapering until now.  We see this happen to people all the time.  Her opiate medications are probably significantly responsible for  "much of the escalation in her pain all over her body.  Also today, she is reporting worse pain, in the context of a lot of grief and loss and I talked about that but I do not think she really has much insight into those connections.    SH: Reviewed, as above, unchanged.    ROS: 4 system ROS negative except as above    PE: /81 (BP Location: Left arm, Patient Position: Sitting, Cuff Size: Adult Regular)  Pulse 80  Temp 98.8  F (37.1  C) (Oral)  Resp 16  Ht 1.651 m (5' 5\")  Wt 85.6 kg (188 lb 11.2 oz)  SpO2 98%  BMI 31.4 kg/m2  Wt Readings from Last 3 Encounters:   06/08/18 82.3 kg (181 lb 7 oz)   05/29/18 84 kg (185 lb 1.6 oz)   05/29/18 84 kg (185 lb 1.6 oz)     Alert healthy appearing middle-aged woman in no acute distress.  Affect is flat and somewhat irritable.  Clear sensorium fluent speech normal memory thought processes.  I examine her right foot where she is reporting severe edema related pain.  I cannot appreciate any significant edema or pitting edema throughout the extremity.    Data reviewed:  I reviewed recent labs and imaging, my comments:  CT May  IMPRESSION: In this patient with a history of multiple malignancies  including diffuse large B-cell lymphoma, breast cancer, and papillary  thyroid cancer:  1. Treatment related changes in the posterior right 10th rib with  slightly increased sclerosis and minimal residual soft tissue  thickening. No evidence of disease progression or new metastatic  disease in the chest, abdomen or pelvis.  2. Slightly increased intra and extra hepatic biliary ductal  dilatation, may be reservoir effect from prior cholecystectomy,  correlate with liver function tests.  3. Bilateral nonobstructing renal calculi, largest measuring 3 mm in  the inferior pole right kidney.      Impression & Recommendations:  57-year-old woman with chronic chest wall pain and multifocal pains as above.  Extensive discussion about opioid tapering today.  We are in an impasse.  I do not " think she has much insight into the fact that she continues to have severe pain despite  marked opiate escalation and does not believe me when I say that I think she will be better off on lower doses.  At the end of the day I can make her believe that but I am beginning tapering today-reducing her oxycodone by 10% a month.  My goal is to get her back down to her doses that she was on before the lymphoma which was 30 mg MS Contin in the morning and 45 mg of MS Contin in the evening and no as needed dose.    She continues to see her psychotherapist.      Talked about the new opioid safety policies in clinic-naloxone prescription and urine drug screen today.    Chart data reviewed today:      Family History   Problem Relation Age of Onset     Allergies Mother      Arthritis Mother      Cancer Mother      uterine/uterine     Hypertension Mother      on HCTZ     Hyperlipidemia Mother      Cerebrovascular Disease Mother      s/p brain surgery     Breast Cancer Mother      Depression Mother      Anxiety Disorder Mother      Anesthesia Reaction Mother      Asthma Mother      Skin Cancer Mother      HEART DISEASE Father      Diabetes Father      Coronary Artery Disease Father      Hypertension Father      Hyperlipidemia Father      Cerebrovascular Disease Father      Multiple strokes     Obesity Father      Diabetes Brother      Depression Brother      Hypertension Brother      Cancer Maternal Grandfather      pancreatic cancer/stomach cancer     Prostate Cancer Maternal Grandfather      Prostrate and Bladder     Other Cancer Maternal Grandfather      Pancreatic 1988, Bladder 1977     Cancer Maternal Grandmother      uterine     Breast Cancer Maternal Grandmother      Skin Cancer Maternal Grandmother      Cancer Brother 57     pancreatic cancer     Diabetes Brother      Breast Cancer Cousin      Grand mothers sister     Other Cancer Brother      Stage 3 Pancreatic 2-2015     Depression Brother      Asthma Brother       Obesity Brother      Anesthesia Reaction Other      H/a, itching, drug interactions     Asthma Other      Cancer Brother      Pancreatic      Thyroid Disease No family hx of      Past Medical History:   Diagnosis Date     Allergic rhinitis due to animal dander      Asthma      Breast cancer (H) 12    right     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 No real need for Epipen for this alone.     GDM (gestational diabetes mellitus)     w first pregnancy only     Gestational hypertension      Lymphedema     jackson arms, chest     Migraine      Neuropathy associated with cancer (H)      Papillary carcinoma, follicular variant (H) 2013    pT3, N1, Mx, thyroid     Post-surgical hypothyroidism      Renal disease     kidney stones     Rhinitis, allergic to other allergen      Right Breast mass and microcalcifications 2011     Seasonal allergic conjunctivitis      Seasonal allergic rhinitis 11 skin tests pos. for: dog/M/T/G/W--NEGATIVE FOOD TESTS FOR: shrimp, crab, lobster, coconut     Past Surgical History:   Procedure Laterality Date     BACK SURGERY  ,    Bulging disc w nerve root impigment     BIOPSY BREAST      right     BIOPSY BREAST  11    right, core and sterotactic     BONE MARROW BIOPSY, BONE SPECIMEN, NEEDLE/TROCAR Left 10/3/2017    Procedure: BIOPSY BONE MARROW;  Bone Marrow Biopsy with aspirate;  Surgeon: Amelia Watkins PA-C;  Location: [facility] OR     BYPASS GASTRIC, CHOLECYSTECTOMY, COMBINED       C LAPAROSCOPIC GASTRIC RESTRICTIVE PX, W/GASTRIC BYPASS/ GAMALIEL-EN-Y, < 150CM      Gamaliel en Y?      SECTION       COLONOSCOPY      normal     COSMETIC SURGERY  2012    Final Stage of Mastectomy     DAVINCI HYSTERECTOMY TOTAL, BILATERAL SALPINGO-OOPHORECTOMY, COMBINED  2012     Procedure: COMBINED DAVINCI HYSTERECTOMY TOTAL, SALPINGO-OOPHORECTOMY;  Davinci Total Laparoscopic Hysterectomy, Bilateral Salpingo Oophorectomy, Pelvic Washings, Cystoscopy;  Surgeon: Evie Sheikh MD;  Location: [facility] OR     ENT SURGERY  1982     ESOPHAGOSCOPY, GASTROSCOPY, DUODENOSCOPY (EGD), COMBINED N/A 9/14/2017    Procedure: COMBINED ENDOSCOPIC ULTRASOUND, ESOPHAGOSCOPY, GASTROSCOPY, DUODENOSCOPY (EGD), FINE NEEDLE ASPIRATE/BIOPSY;  Esophagogastroduodenoscopy, Endoscopic Ultrasound, with fine needle biopsy aspirate;  Surgeon: Patrick Robles MD;  Location: [facility] OR     GI SURGERY  11-    Rachael-en Y w cholecystectomy     GYN SURGERY  1/6/89,1/10/92    2 c-sections     HYSTERECTOMY, PAP NO LONGER INDICATED  12/12    DeVinci assister lap hyst with BSO     INSERT PORT VASCULAR ACCESS Right 10/4/2017    Procedure: INSERT PORT VASCULAR ACCESS;  Port Placement;  Surgeon: Jc Goodman PA-C;  Location: [facility] OR     IRRIGATION AND DEBRIDEMENT BREAST  3/1/2012    Procedure:IRRIGATION AND DEBRIDEMENT BREAST; Irrigation and Debridement, Wound Closure Right Breast; Surgeon:JUNG GUILLEN; Location:[facility] OR     MASTECTOMY, RECONSTRUCT BREAST, COMBINED  1/30/2012    Procedure:COMBINED MASTECTOMY, RECONSTRUCT BREAST; Bilateral Mastectomies, Right Axillary [place] Node Biopsy, Bilateral Breast Reconstruction with Tissue Expanders, Reconstruction of inframammary fold, bilateral pain management systems; Surgeon:ANUPAM CAMPBELL; Location:[facility] OR     RECONSTRUCT BREAST  8/31/2012    Procedure: RECONSTRUCT BREAST;  Bilateral 2nd stage breast reconstruction, revision,       SOFT TISSUE SURGERY  1-30-12    Mastectomy w severe myofascial pain syndrome     THYROIDECTOMY  7/10/2013    Procedure: THYROIDECTOMY;  Total Thyroidectomy with central neck dissection;  Surgeon: Indiana Sneed MD;  Location: [facility] OR     TONSILLECTOMY      childhood     Allergies   Allergen Reactions     Baclofen Other (See Comments)  and Unknown     Other reaction(s): Edema, chest pain, seizures.      No Clinical Screening - See Comments Shortness Of Breath, Palpitations, Anaphylaxis, Itching, Swelling, Difficulty breathing and Rash     Sukhjinder wipes- oral allergy -  July 2015: throat tightness from a Chinese herbal medicine Wilmer Barry Tran     Serotonin Anxiety, Other (See Comments) and Swelling     Seizures      Tree Nuts [Nuts] Shortness Of Breath     Amitriptyline Hcl Swelling     Birch Trees      Potatoes, carrots, cherries, celery, apple, pears, plums, peaches, parsnip, kiwi, hazelnuts, and apricots,      Blue Dyes Itching     Headaches       Cymbalta Other (See Comments)     Flushing, tremor/muscle twitching and edema     Gabapentin Other (See Comments)     edema  Systemic edema, weaned off from Feb to March per Dr. Dowd.    edema     Grass      Mugwort [Artemisia Vulgaris]      Various spices     Ragweeds      Melons, bananas, cucumbers, zucchini.     Topamax      Nortriptyline Itching, Visual Disturbance, Swelling, GI Disturbance, Anxiety, Other (See Comments) and Nausea     Other reaction(s): Swelling     Medications: I have reviewed the patient's medication profile. MNPMP: reviewed    Thank you for involving us in the patient's care.   Edu Benitez MD / Palliative Medicine / Pager [phone] / [facility] Inpatient Team Consult Pager [phone] (answered 8am-430pm M-F) - ok to text page via [employer] / After-Hours Answering Service [phone] / Palliative Clinic in the [facility] at the [facility] - [phone] (scheduling); [phone] (triage).

## 2018-08-29 NOTE — NURSING NOTE
"Oncology Rooming Note    August 29, 2018 7:16 AM   Tisha Arias is a 57 year old female who presents for:    Chief Complaint   Patient presents with     Oncology Clinic Visit     Return : Palliative     Initial Vitals: /81 (BP Location: Left arm, Patient Position: Sitting, Cuff Size: Adult Regular)  Pulse 80  Temp 98.8  F (37.1  C) (Oral)  Resp 16  Ht 1.651 m (5' 5\")  Wt 85.6 kg (188 lb 11.2 oz)  SpO2 98%  BMI 31.4 kg/m2 Estimated body mass index is 31.4 kg/(m^2) as calculated from the following:    Height as of this encounter: 1.651 m (5' 5\").    Weight as of this encounter: 85.6 kg (188 lb 11.2 oz). Body surface area is 1.98 meters squared.  Extreme Pain (9) Comment: chest,head,mid to upper back   No LMP recorded. Patient has had a hysterectomy.  Allergies reviewed: Yes  Medications reviewed: Yes    Medications: Patient needs a refill on Zofran, Oxycodone, Diazepam, Morphine and Imitrex.  Pharmacy name entered into [employer]:    [id number] - [place], [place] - [address]  [facility] - [place], [place] - [address]    Clinical concerns: Patient states that her pain has increased resulting to her waking up in the middle of the night.     10 minutes for nursing intake (face to face time)     Qian Cadena CMA              "

## 2018-08-29 NOTE — MR AVS SNAPSHOT
After Visit Summary   8/29/2018    Tisha Arias    MRN: [id number]           Patient Information     Date Of Birth          1960        Visit Information        Provider Department      8/29/2018 7:00 AM Edu Benitez MD [facility]        Today's Diagnoses     Other chronic postprocedural pain    -  1    Encounter for long-term opiate analgesic use        High grade B-cell lymphoma (H)        Neoplasm related pain        Nausea and vomiting, intractability of vomiting not specified, unspecified vomiting type        Intractable persistent migraine aura without cerebral infarction and without status migrainosus           Follow-ups after your visit        Your next 10 appointments already scheduled     Sep 04, 2018  4:30 PM CDT   [facility] Draw with  [facility]   [facility] ([facility])    [address] [place] [address]  [address] [place] [id number]   [phone]            Sep 04, 2018  5:00 PM CDT   RETURN ONC with Garima Sauceda MD   [facility] Blood and Marrow Transplant ([facility])    [address] [place] [address]  [address]  [facility] [address]   [phone]            Nov 26, 2018  3:00 PM CST   (Arrive by 2:45 PM)   Return Visit with Shahla Crawley MD   [facility] ([facility])    [address] [place] [address]  [address]  [facility] [address]   [phone]            Nov 26, 2018  4:40 PM CST   (Arrive by 4:25 PM)   RETURN ENDOCRINE with Avelina Castro MD   [facility] Endocrinology ([facility])    [address] [place] [address]  3rd Floor  [facility] [address]   [phone]            Nov 28, 2018  9:00 AM CST   (Arrive by 8:45 AM)   Return Visit with Edu Benitez MD   [facility] ([facility])    [address] [place] [address]  [address]  [facility] [address]  "  [phone]              Who to contact     If you have questions or need follow up information about today's clinic visit or your schedule please contact [facility] directly at [phone].  Normal or non-critical lab and imaging results will be communicated to you by MyChart, letter or phone within 4 business days after the clinic has received the results. If you do not hear from us within 7 days, please contact the clinic through Hubspherehart or phone. If you have a critical or abnormal lab result, we will notify you by phone as soon as possible.  Submit refill requests through Lumoid or call your pharmacy and they will forward the refill request to us. Please allow 3 business days for your refill to be completed.          Additional Information About Your Visit        [email or web address] Information     Lumoid gives you secure access to your electronic health record. If you see a primary care provider, you can also send messages to your care team and make appointments. If you have questions, please call your primary care clinic.  If you do not have a primary care provider, please call [phone] and they will assist you.        Care EveryWhere ID     This is your Care EveryWhere ID. This could be used by other organizations to access your [place] medical records  [id number]        Your Vitals Were     Pulse Temperature Respirations Height Pulse Oximetry BMI (Body Mass Index)    80 98.8  F (37.1  C) (Oral) 16 1.651 m (5' 5\") 98% 31.4 kg/m2       Blood Pressure from Last 3 Encounters:   08/29/18 139/81   06/08/18 (!) 141/92   05/29/18 123/85    Weight from Last 3 Encounters:   08/29/18 85.6 kg (188 lb 11.2 oz)   06/08/18 82.3 kg (181 lb 7 oz)   05/29/18 84 kg (185 lb 1.6 oz)                 Today's Medication Changes          These changes are accurate as of 8/29/18 11:59 PM.  If you have any questions, ask your nurse or doctor.               Start taking these medicines.        Dose/Directions    " naloxone nasal spray   Commonly known as:  NARCAN   Used for:  Encounter for long-term opiate analgesic use   Started by:  Edu Benitez MD        Dose:  4 mg   Spray 1 spray (4 mg) into one nostril alternating nostrils as needed for opioid reversal every 2-3 minutes until assistance arrives   Quantity:  0.2 mL   Refills:  0         These medicines have changed or have updated prescriptions.        Dose/Directions    cyclobenzaprine 10 MG tablet   Commonly known as:  FLEXERIL   This may have changed:  when to take this   Used for:  High grade B-cell lymphoma (H)        Dose:  10 mg   Take 1 tablet (10 mg) by mouth 3 times daily as needed   Quantity:  30 tablet   Refills:  0       morphine 30 MG 12 hr tablet   Commonly known as:  MS CONTIN   This may have changed:  These instructions start on 9/10/2018. If you are unsure what to do until then, ask your doctor or other care provider.   Used for:  Neoplasm related pain   Changed by:  Edu Benitez MD        Dose:  30 mg   Start taking on:  9/10/2018   Take 1 tablet (30 mg) by mouth every 8 hours . Take an addition pill at night such that your evening dose is 60mg   Quantity:  120 tablet   Refills:  0       * oxyCODONE IR 30 MG tablet   Commonly known as:  ROXICODONE   This may have changed:  Another medication with the same name was added. Make sure you understand how and when to take each.   Used for:  Neoplasm related pain, High grade B-cell lymphoma (H)   Changed by:  Edu Benitez MD        Dose:  30 mg   Take 1 tablet (30 mg) by mouth every 4 hours as needed for moderate to severe pain   Quantity:  60 tablet   Refills:  0       * oxyCODONE IR 30 MG tablet   Commonly known as:  ROXICODONE   This may have changed:  You were already taking a medication with the same name, and this prescription was added. Make sure you understand how and when to take each.   Used for:  Other chronic postprocedural pain, Encounter for long-term opiate  analgesic use   Changed by:  Edu Benitez MD        Dose:  30 mg   Start taking on:  9/10/2018   Take 1 tablet (30 mg) by mouth every 4 hours as needed for moderate to severe pain . This is a 30day supply   Quantity:  162 tablet   Refills:  0       * Notice:  This list has 2 medication(s) that are the same as other medications prescribed for you. Read the directions carefully, and ask your doctor or other care provider to review them with you.         Where to get your medicines      These medications were sent to [id number] - [place], [place] - [address] [address]  [address] [address] [place] [address]     Phone:  [phone]     naloxone nasal spray    ondansetron 8 MG ODT tab    SUMAtriptan 50 MG tablet         Some of these will need a paper prescription and others can be bought over the counter.  Ask your nurse if you have questions.     Bring a paper prescription for each of these medications     morphine 30 MG 12 hr tablet    oxyCODONE IR 30 MG tablet               Information about OPIOIDS     PRESCRIPTION OPIOIDS: WHAT YOU NEED TO KNOW   We gave you an opioid (narcotic) pain medicine. It is important to manage your pain, but opioids are not always the best choice. You should first try all the other options your care team gave you. Take this medicine for as short a time (and as few doses) as possible.    Some activities can increase your pain, such as bandage changes or therapy sessions. It may help to take your pain medicine 30 to 60 minutes before these activities. Reduce your stress by getting enough sleep, working on hobbies you enjoy and practicing relaxation or meditation. Talk to your care team about ways to manage your pain beyond prescription opioids.    These medicines have risks:    DO NOT drive when on new or higher doses of pain medicine. These medicines can affect your alertness and reaction times, and you could be arrested for driving under the influence (DUI). If you  need to use opioids long-term, talk to your care team about driving.    DO NOT operate heavy machinery    DO NOT do any other dangerous activities while taking these medicines.    DO NOT drink any alcohol while taking these medicines.     If the opioid prescribed includes acetaminophen, DO NOT take with any other medicines that contain acetaminophen. Read all labels carefully. Look for the word  acetaminophen  or  Tylenol.  Ask your pharmacist if you have questions or are unsure.    You can get addicted to pain medicines, especially if you have a history of addiction (chemical, alcohol or substance dependence). Talk to your care team about ways to reduce this risk.    All opioids tend to cause constipation. Drink plenty of water and eat foods that have a lot of fiber, such as fruits, vegetables, prune juice, apple juice and high-fiber cereal. Take a laxative (Miralax, milk of magnesia, Colace, Senna) if you don t move your bowels at least every other day. Other side effects include upset stomach, sleepiness, dizziness, throwing up, tolerance (needing more of the medicine to have the same effect), physical dependence and slowed breathing.    Store your pills in a secure place, locked if possible. We will not replace any lost or stolen medicine. If you don t finish your medicine, please throw away (dispose) as directed by your pharmacist. The [place] Pollution [employer] has more information about safe disposal: [email or web address]://[email or web address].[email or web address].us/[email or web address]-medications         Primary Care Provider Office Phone # Fax #    Shahla Crawley [phone]       [address] [address]  [facility] [address]        Equal Access to Services     RODRIGO ZAMORA : Kevin huff Soesteban, waaxda luqadaha, qaybta kaalmada daina, ranjan martin. So [facility] [phone].    ATENCIÓN: Si habla español, tiene a stiles disposición servicios gratuitos de asistencia  lingüística. Oneil al [phone].    We comply with applicable federal civil rights laws and [place] laws. We do not discriminate on the basis of race, color, national origin, age, disability, sex, sexual orientation, or gender identity.            Thank you!     Thank you for choosing [facility]  for your care. Our goal is always to provide you with excellent care. Hearing back from our patients is one way we can continue to improve our services. Please take a few minutes to complete the written survey that you may receive in the mail after your visit with us. Thank you!             Your Updated Medication List - Protect others around you: Learn how to safely use, store and throw away your medicines at www.disposemymeds.org.          This list is accurate as of 8/29/18 11:59 PM.  Always use your most recent med list.                   Brand Name Dispense Instructions for use Diagnosis    ACAI RAMIREZ PO      Take 50 mg by mouth        acetaminophen 325 MG tablet    TYLENOL    100 tablet    Take 2 tablets (650 mg) by mouth every 4 hours as needed for mild pain or fever    High grade B-cell lymphoma (H)       acyclovir 400 MG tablet    ZOVIRAX    30 tablet    Take 1 tablet (400 mg) by mouth 2 times daily    High grade B-cell lymphoma (H)       aluminum chloride 20 % external solution    DRYSOL    60 mL    Apply topically At Bedtime    Rash, Intertrigo       AZMACORT IN      Inhale 2 puffs into the lungs as needed        bisacodyl 5 MG EC tablet     30 tablet    Take 3 tablets (15 mg) by mouth daily as needed for constipation    Constipation, unspecified constipation type       calcitRIOL 0.25 MCG capsule    ROCALTROL    90 capsule    TAKE 2 CAPSULES BY MOUTH EVERY MORNING AND TAKE 1 CAPSULE BY MOUTH EVERY NIGHT AT BEDTIME    Postsurgical hypothyroidism, Papillary carcinoma, follicular variant (H), Metastasis to cervical lymph node (H)       calcium Citrate-vitamin D 500-400 MG-UNIT Chew     60  tablet    Take 1 tablet by mouth 3 times daily        celecoxib 200 MG capsule    celeBREX    60 capsule    Take 1 capsule (200 mg) by mouth 2 times daily    Chest wall pain       cetirizine 10 MG tablet    ZYRTEC ALLERGY    90 tablet    Take 1 tablet (10 mg) by mouth 3 times daily On hold for lab test.    High grade B-cell lymphoma (H)       COMPOUND CONTAINING CONTROLLED SUBSTANCE - PHARMACY TO MIX COMPOUNDED MEDICATION    CMPD RX    120 g    Apply small amount to affected area two times daily.    Neoplasm related pain       cromolyn 4 % ophthalmic solution    OPTICROM    10 mL    Place 1 drop into both eyes 4 times daily    Idiopathic mast cell activation syndrome (H)       cromolyn sodium 5.2 MG/ACT Aers Inhaler    NASALCROM    1 Bottle    SPRAY ONE SPRAY( 1 ML) IN NOSTRIL DAILY    Mast cell disease, systemic       [employer] GUMMY [employer]ILDREN Chew     120 tablet    Take 5 g by mouth daily (2 gummy= 5 g =1 serving)    High grade B-cell lymphoma (H)       cyclobenzaprine 10 MG tablet    FLEXERIL    30 tablet    Take 1 tablet (10 mg) by mouth 3 times daily as needed    High grade B-cell lymphoma (H)       diazepam 5 MG tablet    VALIUM    90 tablet    TAKE ONE TABLET (5 MG) BY MOUTH THREE TIMES DAILY AS NEEDED FOR MUSCLE SPASMS    Chest wall pain       diclofenac 1 % Gel topical gel    VOLTAREN    100 g    Apply affected area two times daily PRN using enclosed dosing card.    Myofascial pain       EPINEPHrine 0.3 MG/0.3ML injection 2-pack    EPIPEN 2-CARIDAD    0.6 mL    Inject 0.3 mLs (0.3 mg) into the muscle once as needed for anaphylaxis    Anaphylaxis, subsequent encounter       fluticasone 50 MCG/ACT spray    FLONASE    1 Bottle    Spray 1-2 sprays into both nostrils daily    Bacterial sinusitis       * furosemide 20 MG tablet    LASIX    60 tablet    Take 1 tablet (20 mg) by mouth 2 times daily    Localized edema       * furosemide 20 MG tablet    LASIX    60 tablet    Take 1 tablet (20 mg) by mouth 2 times  daily    Bilateral lower extremity edema       hydrochlorothiazide 12.5 MG capsule    MICROZIDE    30 capsule    Take 1 capsule (12.5 mg) by mouth daily    Hypocalcemia       levofloxacin 750 MG tablet    LEVAQUIN    10 tablet    Take 1 tablet (750 mg) by mouth daily    Bacterial sinusitis       levothyroxine 112 MCG tablet    SYNTHROID/LEVOTHROID    189 tablet    ON MONDAY THRU SATURDAY TAKE TWO TABLETS DAILY AND ON SUNDAY TAKE 3 TABLETS DAILY.    Postsurgical hypothyroidism, Papillary carcinoma, follicular variant (H), Postsurgical hypoparathyroidism (H), Thyroid cancer (H)       lidocaine 5 % ointment    XYLOCAINE    350 g    Apply quarter size amount to chest and back up to 3 times daily as needed for pain.    Chest wall pain       lidocaine visc 2% 2.5mL/5mL & maalox/mylanta w/ simeth 2.5mL/5mL & diphenhydrAMINE 5mg/5mL Susp suspension    [facility]    360 mL    Swish and spit 10 mLs in mouth every 6 hours as needed for mouth sores    Stomatitis and mucositis, High grade B-cell lymphoma (H)       * lidocaine-prilocaine cream    EMLA    30 g    Apply topically as needed (port access pain.)    Neoplasm related pain, Chest wall pain       * lidocaine-prilocaine cream    EMLA    60 g    Apply topically as needed for moderate pain    High grade B-cell lymphoma (H)       magic mouthwash suspension (diphenhydrAMINE, lidocaine, aluminum-magnesium & simethicone) Susp compounding kit     237 mL    Swish and spit 5-10 mLs in mouth every 6 hours as needed for mouth sores    Stomatitis and mucositis, High grade B-cell lymphoma (H)       methocarbamol 750 MG tablet    ROBAXIN    360 tablet    Take 1 tablet (750 mg) by mouth 4 times daily . Ok to take a 5th Robaxin on very severe days.    Myofascial pain       montelukast 10 MG tablet    SINGULAIR    120 tablet    Take 2 tablets (20 mg) by mouth 2 times daily    Idiopathic mast cell activation syndrome (H)       morphine 30 MG 12 hr tablet   Start taking on:   9/10/2018    MS CONTIN    120 tablet    Take 1 tablet (30 mg) by mouth every 8 hours . Take an addition pill at night such that your evening dose is 60mg    Neoplasm related pain       naloxone nasal spray    NARCAN    0.2 mL    Spray 1 spray (4 mg) into one nostril alternating nostrils as needed for opioid reversal every 2-3 minutes until assistance arrives    Encounter for long-term opiate analgesic use       olopatadine 0.1 % ophthalmic solution    PATANOL     Apply 1 drop to eye        * ondansetron 8 MG ODT tab    ZOFRAN-ODT    30 tablet    DISSOLVE ONE TABLET IN MOUTH EVERY EIGHT HOURS AS NEEDED    High grade B-cell lymphoma (H), Nausea and vomiting, intractability of vomiting not specified, unspecified vomiting type, Breast cancer (H)       * ondansetron 8 MG ODT tab    ZOFRAN-ODT    30 tablet    Take 1 tablet (8 mg) by mouth every 8 hours as needed    High grade B-cell lymphoma (H), Nausea and vomiting, intractability of vomiting not specified, unspecified vomiting type       * order for DME     1 Units    Compression stockings, medium grade, please measure and fit. Please dispense a pair.    Peripheral edema       * order for DME     2 Units    Equipment being ordered: compression stockings. 20-30 mm or 30 - 40 mm as patient can tolerate. Physical therapy to determine if they should be above or below the knee.    Venous stasis       * order for DME     2 Device    Equipment being ordered: compression bra    Malignant neoplasm of right female breast (H)       order for DME     1 Units    Compression socks - knee high 20-30 mmHg zippered if possible    Lower extremity edema       * oxyCODONE IR 30 MG tablet    ROXICODONE    60 tablet    Take 1 tablet (30 mg) by mouth every 4 hours as needed for moderate to severe pain    Neoplasm related pain, High grade B-cell lymphoma (H)       * oxyCODONE IR 30 MG tablet   Start taking on:  9/10/2018    ROXICODONE    162 tablet    Take 1 tablet (30 mg) by mouth every 4  hours as needed for moderate to severe pain . This is a 30day supply    Other chronic postprocedural pain, Encounter for long-term opiate analgesic use       polyethylene glycol powder    MIRALAX    510 g    Take 17 g (1 capful) by mouth daily    Acute constipation       potassium chloride SA 20 MEQ CR tablet    KLOR-CON    60 tablet    Take 1 tablet (20 mEq) by mouth 2 times daily    Hypokalemia       PROBIOTIC DAILY PO      Take 1 capsule by mouth daily Lacto acid bifidobacterium    Breast cancer, unspecified laterality, Thyroid cancer (H), Chronic arthralgias of knees and hips, unspecified laterality       prochlorperazine 10 MG tablet    COMPAZINE     Take 10 mg by mouth as needed        ranitidine 75 MG tablet    ZANTAC    90 tablet    Take 1 tablet (75 mg) by mouth 3 times daily    Mast cell disease, systemic       senna-docusate 8.6-50 MG per tablet    SENOKOT-S;PERICOLACE    60 tablet    Take 1-2 tablets by mouth 2 times daily as needed for constipation    Acute constipation       SUMAtriptan 50 MG tablet    IMITREX    30 tablet    Take 1 tablet (50 mg) by mouth as needed    Intractable persistent migraine aura without cerebral infarction and without status migrainosus       tamoxifen 20 MG tablet    NOLVADEX    90 tablet    Take 1 tablet (20 mg) by mouth daily    Ductal carcinoma in situ (DCIS) of breast, unspecified laterality       triamcinolone 0.025 % ointment    KENALOG     Apply topically as needed        UNABLE TO FIND      3 tablets 3 times daily MEDICATION NAME: calcium D-Glucarate  3 caps contain 180mg of elemental calcium.        UNABLE TO FIND      Take 2 capsules by mouth 3 times daily Muscle Mag. 2 caps contain B1 20mg, B2 20mg, B6 10mg, magesium 20mg, manganese 2mg.        UNABLE TO FIND      2 tablets 3 times daily MEDICATION NAME: Digestzymes    Thyroid cancer (H), Postsurgical hypothyroidism, Postsurgical hypoparathyroidism (H)       UNABLE TO FIND      1 tablet daily MEDICATION NAME: Pure  Encapsulations    Thyroid cancer (H), Postsurgical hypothyroidism, Postsurgical hypoparathyroidism (H)       VENTOLIN  (90 Base) MCG/ACT inhaler   Generic drug:  albuterol     18 g    INHALE 2 PUFFS INTO THE LUNGS EVERY 4 HOURS AS NEEDED FOR SHORTNESS OF BREATH OR DIFFICULT BREATHING OR WHEEZING    Mild intermittent asthma without complication       vitamin D3 2000 units Caps     60 capsule    Take 5,000 Units by mouth daily Takes 2 tabs daily    Thyroid cancer (H), Postsurgical hypothyroidism, Papillary carcinoma, follicular variant (H), Postsurgical hypoparathyroidism (H)       * Notice:  This list has 11 medication(s) that are the same as other medications prescribed for you. Read the directions carefully, and ask your doctor or other care provider to review them with you.

## 2018-08-30 DIAGNOSIS — C83.30 DIFFUSE LARGE B CELL LYMPHOMA (H): ICD-10-CM

## 2018-08-30 DIAGNOSIS — Z79.891 ENCOUNTER FOR LONG-TERM OPIATE ANALGESIC USE: ICD-10-CM

## 2018-08-30 DIAGNOSIS — R60.0 BILATERAL LOWER EXTREMITY EDEMA: ICD-10-CM

## 2018-08-30 LAB
ALBUMIN UR-MCNC: NEGATIVE MG/DL
APPEARANCE UR: ABNORMAL
BACTERIA #/AREA URNS HPF: ABNORMAL /HPF
BILIRUB UR QL STRIP: ABNORMAL
COLOR UR AUTO: YELLOW
CREAT UR-MCNC: 171 MG/DL
GLUCOSE UR STRIP-MCNC: NEGATIVE MG/DL
HGB UR QL STRIP: NEGATIVE
KETONES UR STRIP-MCNC: NEGATIVE MG/DL
LEUKOCYTE ESTERASE UR QL STRIP: NEGATIVE
MUCOUS THREADS #/AREA URNS LPF: PRESENT /LPF
NITRATE UR QL: NEGATIVE
PH UR STRIP: 6 PH (ref 5–7)
PROT UR-MCNC: 0.18 G/L
PROT/CREAT 24H UR: 0.11 G/G CR (ref 0–0.2)
RBC #/AREA URNS AUTO: 3 /HPF (ref 0–2)
SOURCE: ABNORMAL
SP GR UR STRIP: 1.02 (ref 1–1.03)
SQUAMOUS #/AREA URNS AUTO: 6 /HPF (ref 0–1)
UROBILINOGEN UR STRIP-MCNC: 0 MG/DL (ref 0–2)
WBC #/AREA URNS AUTO: 1 /HPF (ref 0–5)

## 2018-08-31 DIAGNOSIS — C83.30 DLBCL (DIFFUSE LARGE B CELL LYMPHOMA) (H): ICD-10-CM

## 2018-08-31 DIAGNOSIS — E89.2 POSTSURGICAL HYPOPARATHYROIDISM (H): ICD-10-CM

## 2018-08-31 DIAGNOSIS — E83.51 HYPOCALCEMIA: ICD-10-CM

## 2018-08-31 DIAGNOSIS — R25.2 MUSCLE CRAMP: ICD-10-CM

## 2018-08-31 DIAGNOSIS — E83.51 HYPOCALCEMIA: Primary | ICD-10-CM

## 2018-08-31 DIAGNOSIS — E89.0 POSTSURGICAL HYPOTHYROIDISM: ICD-10-CM

## 2018-08-31 DIAGNOSIS — C83.30 DLBCL (DIFFUSE LARGE B CELL LYMPHOMA) (H): Primary | ICD-10-CM

## 2018-08-31 LAB
ALBUMIN SERPL-MCNC: 4 G/DL (ref 3.4–5)
ALP SERPL-CCNC: 97 U/L (ref 40–150)
ALT SERPL W P-5'-P-CCNC: 23 U/L (ref 0–50)
ANION GAP SERPL CALCULATED.3IONS-SCNC: 6 MMOL/L (ref 3–14)
AST SERPL W P-5'-P-CCNC: 14 U/L (ref 0–45)
BASOPHILS # BLD AUTO: 0 10E9/L (ref 0–0.2)
BASOPHILS NFR BLD AUTO: 0.6 %
BILIRUB SERPL-MCNC: 0.3 MG/DL (ref 0.2–1.3)
BUN SERPL-MCNC: 17 MG/DL (ref 7–30)
CA-I SERPL ISE-MCNC: 4.4 MG/DL (ref 4.4–5.2)
CALCIUM SERPL-MCNC: 8.4 MG/DL (ref 8.5–10.1)
CHLORIDE SERPL-SCNC: 102 MMOL/L (ref 94–109)
CO2 SERPL-SCNC: 33 MMOL/L (ref 20–32)
CREAT SERPL-MCNC: 1.11 MG/DL (ref 0.52–1.04)
DIFFERENTIAL METHOD BLD: ABNORMAL
EOSINOPHIL # BLD AUTO: 0.1 10E9/L (ref 0–0.7)
EOSINOPHIL NFR BLD AUTO: 1.7 %
ERYTHROCYTE [DISTWIDTH] IN BLOOD BY AUTOMATED COUNT: 12.1 % (ref 10–15)
GFR SERPL CREATININE-BSD FRML MDRD: 51 ML/MIN/1.7M2
GLUCOSE SERPL-MCNC: 108 MG/DL (ref 70–99)
HCT VFR BLD AUTO: 41.9 % (ref 35–47)
HGB BLD-MCNC: 13.8 G/DL (ref 11.7–15.7)
IMM GRANULOCYTES # BLD: 0 10E9/L (ref 0–0.4)
IMM GRANULOCYTES NFR BLD: 0.3 %
LDH SERPL L TO P-CCNC: 201 U/L (ref 81–234)
LYMPHOCYTES # BLD AUTO: 0.9 10E9/L (ref 0.8–5.3)
LYMPHOCYTES NFR BLD AUTO: 26.1 %
MAGNESIUM SERPL-MCNC: 2 MG/DL (ref 1.6–2.3)
MCH RBC QN AUTO: 32.1 PG (ref 26.5–33)
MCHC RBC AUTO-ENTMCNC: 32.9 G/DL (ref 31.5–36.5)
MCV RBC AUTO: 97 FL (ref 78–100)
MONOCYTES # BLD AUTO: 0.4 10E9/L (ref 0–1.3)
MONOCYTES NFR BLD AUTO: 10.4 %
NEUTROPHILS # BLD AUTO: 2.1 10E9/L (ref 1.6–8.3)
NEUTROPHILS NFR BLD AUTO: 60.9 %
NRBC # BLD AUTO: 0 10*3/UL
NRBC BLD AUTO-RTO: 0 /100
PHOSPHATE SERPL-MCNC: 3.7 MG/DL (ref 2.5–4.5)
PLATELET # BLD AUTO: 183 10E9/L (ref 150–450)
POTASSIUM SERPL-SCNC: 3.9 MMOL/L (ref 3.4–5.3)
PROT SERPL-MCNC: 7.3 G/DL (ref 6.8–8.8)
RBC # BLD AUTO: 4.3 10E12/L (ref 3.8–5.2)
SODIUM SERPL-SCNC: 140 MMOL/L (ref 133–144)
T4 FREE SERPL-MCNC: 1.23 NG/DL (ref 0.76–1.46)
TSH SERPL DL<=0.005 MIU/L-ACNC: 0.01 MU/L (ref 0.4–4)
WBC # BLD AUTO: 3.5 10E9/L (ref 4–11)

## 2018-09-02 DIAGNOSIS — R60.0 BILATERAL LOWER EXTREMITY EDEMA: ICD-10-CM

## 2018-09-04 ENCOUNTER — OFFICE VISIT (OUTPATIENT)
Dept: TRANSPLANT | Facility: CLINIC | Age: 58
End: 2018-09-04
Payer: COMMERCIAL

## 2018-09-04 ENCOUNTER — APPOINTMENT (OUTPATIENT)
Dept: LAB | Facility: CLINIC | Age: 58
End: 2018-09-04
Payer: COMMERCIAL

## 2018-09-04 VITALS
RESPIRATION RATE: 16 BRPM | OXYGEN SATURATION: 94 % | WEIGHT: 188 LBS | BODY MASS INDEX: 31.32 KG/M2 | DIASTOLIC BLOOD PRESSURE: 61 MMHG | HEIGHT: 65 IN | SYSTOLIC BLOOD PRESSURE: 113 MMHG | TEMPERATURE: 98.8 F | HEART RATE: 85 BPM

## 2018-09-04 DIAGNOSIS — C85.10 HIGH GRADE B-CELL LYMPHOMA (H): ICD-10-CM

## 2018-09-04 LAB
BASOPHILS # BLD AUTO: 0 10E9/L (ref 0–0.2)
BASOPHILS NFR BLD AUTO: 0.8 %
DEPRECATED CALCIDIOL+CALCIFEROL SERPL-MC: <62 UG/L (ref 20–75)
DIFFERENTIAL METHOD BLD: ABNORMAL
EOSINOPHIL # BLD AUTO: 0.1 10E9/L (ref 0–0.7)
EOSINOPHIL NFR BLD AUTO: 1.5 %
ERYTHROCYTE [DISTWIDTH] IN BLOOD BY AUTOMATED COUNT: 11.7 % (ref 10–15)
HCT VFR BLD AUTO: 40 % (ref 35–47)
HGB BLD-MCNC: 13.6 G/DL (ref 11.7–15.7)
IMM GRANULOCYTES # BLD: 0 10E9/L (ref 0–0.4)
IMM GRANULOCYTES NFR BLD: 0 %
LYMPHOCYTES # BLD AUTO: 1 10E9/L (ref 0.8–5.3)
LYMPHOCYTES NFR BLD AUTO: 25.6 %
MCH RBC QN AUTO: 32.5 PG (ref 26.5–33)
MCHC RBC AUTO-ENTMCNC: 34 G/DL (ref 31.5–36.5)
MCV RBC AUTO: 96 FL (ref 78–100)
MONOCYTES # BLD AUTO: 0.5 10E9/L (ref 0–1.3)
MONOCYTES NFR BLD AUTO: 11.5 %
NEUTROPHILS # BLD AUTO: 2.4 10E9/L (ref 1.6–8.3)
NEUTROPHILS NFR BLD AUTO: 60.6 %
NRBC # BLD AUTO: 0 10*3/UL
NRBC BLD AUTO-RTO: 0 /100
PLATELET # BLD AUTO: 158 10E9/L (ref 150–450)
RBC # BLD AUTO: 4.18 10E12/L (ref 3.8–5.2)
VITAMIN D2 SERPL-MCNC: <5 UG/L
VITAMIN D3 SERPL-MCNC: 57 UG/L
WBC # BLD AUTO: 3.9 10E9/L (ref 4–11)

## 2018-09-04 PROCEDURE — 36591 DRAW BLOOD OFF VENOUS DEVICE: CPT

## 2018-09-04 PROCEDURE — G0463 HOSPITAL OUTPT CLINIC VISIT: HCPCS | Mod: ZF

## 2018-09-04 PROCEDURE — 85025 COMPLETE CBC W/AUTO DIFF WBC: CPT | Performed by: PHYSICIAN ASSISTANT

## 2018-09-04 PROCEDURE — 25000128 H RX IP 250 OP 636: Mod: ZF

## 2018-09-04 RX ORDER — HEPARIN SODIUM (PORCINE) LOCK FLUSH IV SOLN 100 UNIT/ML 100 UNIT/ML
5 SOLUTION INTRAVENOUS
Status: COMPLETED | OUTPATIENT
Start: 2018-09-04 | End: 2018-09-04

## 2018-09-04 RX ADMIN — Medication 5 ML: at 17:03

## 2018-09-04 ASSESSMENT — PAIN SCALES - GENERAL: PAINLEVEL: EXTREME PAIN (9)

## 2018-09-04 NOTE — PROGRESS NOTES
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 She is currently undergoing treatment with DA-R-EPOCH. Her past medical history is significant for breast cancer in 2011 s/p bilateral mastectomies and BENJIE/BSO up until recently on Tamoxifen, papillary thyroid cancer s/p total thyroidectomy, chronic chest wall pain, and asthma. Briefly, her oncologic history is as follows:    She presented in 8/2017 with dramatically worse chest and back pain. CT 9/1/17 showed lytic lesion right 10th rib extending to right T9-10 neural foramen with vertebral body invasion. There was associated conglomerate of lymphadenopathy around the mid T-spine. She had a CT guided biopsy showing B-cell high grade lymphoma. Pathology showed \"The morphology shows a population of large cells, diffuse lymphoid infiltrate. The cells are atypical with abundant mitotic and apoptotic activity and single cell necrosis. Tumor is CD45 and CD79a positive and focally weakly CD30 positive. The other stains revealed positivity for CD20, BCL6, BCL2 and MUM1, and CD10 and CD21 negative. The CD5 and CD3 highlighted background T-cells.  MYC expression was equivocal with approximately 40% nuclei staining.  Ki-67 proliferative index is greater than 90-95%. The immunohistochemistry is suggestive of non-GCB immunophenotype of diffuse large B-cell lymphoma. The cytogenetics did not show rearrangement of the BCL2 or c-MYC. There is the presence of BCL6 rearrangement in 78% of cells and gains of MYC and BCL2, but no amplifications.\"     Staging LP and BMBx were negative for lymphoma.   She started DA-REPOCH 10/6/17. She did well with chemo other than rigors during CIVI.  Post cycle 1 complicated by mucositis and worsening of chronic LE peripheral edema.   Cycle 2 on 10/27/17. Due to a rise in her LD and uric acid " levels on that admission day, she underwent a CT scan which showed response to therapy with improvement in her mediastinal lymphadenopathy and right chest wall mass.  Cycle 3 on 11/16      Cycle 4 12/7  C5,6 end of January/208    Interval History:  She reports that she is unwell for past month and has more chest pain and upper back pain. Drenching sweats on and off. Also worse neuropathy.   No lumps or abdominal symptoms.     Current Outpatient Prescriptions   Medication Sig Dispense Refill     ACAI RAMIREZ PO Take 50 mg by mouth       acetaminophen (TYLENOL) 325 MG tablet Take 2 tablets (650 mg) by mouth every 4 hours as needed for mild pain or fever 100 tablet      acyclovir (ZOVIRAX) 400 MG tablet Take 1 tablet (400 mg) by mouth 2 times daily 30 tablet 0     aluminum chloride (DRYSOL) 20 % external solution Apply topically At Bedtime 60 mL 3     bisacodyl (DULCOLAX) 5 MG EC tablet Take 3 tablets (15 mg) by mouth daily as needed for constipation 30 tablet 0     calcitRIOL (ROCALTROL) 0.25 MCG capsule TAKE 2 CAPSULES BY MOUTH EVERY MORNING AND TAKE 1 CAPSULE BY MOUTH EVERY NIGHT AT BEDTIME 90 capsule 11     calcium Citrate-vitamin D 500-400 MG-UNIT CHEW Take 1 tablet by mouth 3 times daily 60 tablet      celecoxib (CELEBREX) 200 MG capsule Take 1 capsule (200 mg) by mouth 2 times daily 60 capsule 3     cetirizine (ZYRTEC ALLERGY) 10 MG tablet Take 1 tablet (10 mg) by mouth 3 times daily On hold for lab test. 90 tablet 0     Cholecalciferol (VITAMIN D3) 2000 UNITS CAPS Take 5,000 Units by mouth daily Takes 2 tabs daily 60 capsule 0     COMPOUND CONTAINING CONTROLLED SUBSTANCE (CMPD RX) - PHARMACY TO MIX COMPOUNDED MEDICATION Apply small amount to affected area two times daily. 120 g 6     cromolyn (OPTICROM) 4 % ophthalmic solution Place 1 drop into both eyes 4 times daily 10 mL 0     cromolyn sodium (NASALCROM) 5.2 MG/ACT AERS Inhaler SPRAY ONE SPRAY( 1 ML) IN NOSTRIL DAILY 1 Bottle 6     [employer] FIBER GUMMY BEARS  CHILDREN CHEW Take 5 g by mouth daily (2 gummy= 5 g =1 serving) 120 tablet 3     cyclobenzaprine (FLEXERIL) 10 MG tablet Take 1 tablet (10 mg) by mouth 3 times daily as needed (Patient taking differently: Take 10 mg by mouth 3 times daily ) 30 tablet 0     diazepam (VALIUM) 5 MG tablet TAKE ONE TABLET (5 MG) BY MOUTH THREE TIMES DAILY AS NEEDED FOR MUSCLE SPASMS 90 tablet 1     diclofenac (VOLTAREN) 1 % GEL topical gel Apply affected area two times daily PRN using enclosed dosing card. 100 g 3     EPINEPHrine (EPIPEN 2-CARIDAD) 0.3 MG/0.3ML injection 2-pack Inject 0.3 mLs (0.3 mg) into the muscle once as needed for anaphylaxis 0.6 mL 3     fluticasone (FLONASE) 50 MCG/ACT spray Spray 1-2 sprays into both nostrils daily 1 Bottle 11     furosemide (LASIX) 20 MG tablet Take 1 tablet (20 mg) by mouth 2 times daily 60 tablet 3     furosemide (LASIX) 20 MG tablet Take 1 tablet (20 mg) by mouth 2 times daily 60 tablet 0     hydrochlorothiazide (MICROZIDE) 12.5 MG capsule Take 1 capsule (12.5 mg) by mouth daily 30 capsule 0     levofloxacin (LEVAQUIN) 750 MG tablet Take 1 tablet (750 mg) by mouth daily 10 tablet 0     levothyroxine (SYNTHROID/LEVOTHROID) 112 MCG tablet ON MONDAY THRU SATURDAY TAKE TWO TABLETS DAILY AND ON SUNDAY TAKE 3 TABLETS DAILY. 189 tablet 3     lidocaine (XYLOCAINE) 5 % ointment Apply quarter size amount to chest and back up to 3 times daily as needed for pain. 350 g 1     lidocaine-prilocaine (EMLA) cream Apply topically as needed for moderate pain 60 g 1     lidocaine-prilocaine (EMLA) cream Apply topically as needed (port access pain.) 30 g 1     magic mouthwash (FIRST-MOUTHWASH BLM) suspension Swish and spit 5-10 mLs in mouth every 6 hours as needed for mouth sores 237 mL 1     magic mouthwash suspension (diphenhydrAMINE, lidocaine, aluminum-magnesium & simethicone) Swish and spit 10 mLs in mouth every 6 hours as needed for mouth sores 360 mL 1     methocarbamol (ROBAXIN) 750 MG tablet Take 1 tablet  (750 mg) by mouth 4 times daily . Ok to take a 5th Robaxin on very severe days. 360 tablet 1     montelukast (SINGULAIR) 10 MG tablet Take 2 tablets (20 mg) by mouth 2 times daily 120 tablet 11     [START ON 9/10/2018] morphine (MS CONTIN) 30 MG 12 hr tablet Take 1 tablet (30 mg) by mouth every 8 hours . Take an addition pill at night such that your evening dose is 60mg 120 tablet 0     naloxone (NARCAN) nasal spray Spray 1 spray (4 mg) into one nostril alternating nostrils as needed for opioid reversal every 2-3 minutes until assistance arrives 0.2 mL 0     olopatadine (PATANOL) 0.1 % ophthalmic solution Apply 1 drop to eye       ondansetron (ZOFRAN-ODT) 8 MG ODT tab Take 1 tablet (8 mg) by mouth every 8 hours as needed 30 tablet 1     ondansetron (ZOFRAN-ODT) 8 MG ODT tab DISSOLVE ONE TABLET IN MOUTH EVERY EIGHT HOURS AS NEEDED  30 tablet 3     order for DME Compression socks - knee high 20-30 mmHg zippered if possible 1 Units 11     order for DME Equipment being ordered: compression stockings. 20-30 mm or 30 - 40 mm as patient can tolerate. Physical therapy to determine if they should be above or below the knee. 2 Units 4     order for DME Equipment being ordered: compression bra 2 Device 1     order for DME Compression stockings, medium grade, please measure and fit. Please dispense a pair. 1 Units 0     [START ON 9/10/2018] oxyCODONE IR (ROXICODONE) 30 MG tablet Take 1 tablet (30 mg) by mouth every 4 hours as needed for moderate to severe pain . This is a 30day supply 162 tablet 0     oxyCODONE IR (ROXICODONE) 30 MG tablet Take 1 tablet (30 mg) by mouth every 4 hours as needed for moderate to severe pain 60 tablet 0     polyethylene glycol (MIRALAX) powder Take 17 g (1 capful) by mouth daily 510 g 0     potassium chloride SA (KLOR-CON) 20 MEQ CR tablet Take 1 tablet (20 mEq) by mouth 2 times daily 60 tablet 3     Probiotic Product (PROBIOTIC DAILY PO) Take 1 capsule by mouth daily Lacto acid bifidobacterium    "    prochlorperazine (COMPAZINE) 10 MG tablet Take 10 mg by mouth as needed  3     ranitidine (ZANTAC) 75 MG tablet Take 1 tablet (75 mg) by mouth 3 times daily 90 tablet 0     senna-docusate (SENOKOT-S;PERICOLACE) 8.6-50 MG per tablet Take 1-2 tablets by mouth 2 times daily as needed for constipation 60 tablet 0     SUMAtriptan (IMITREX) 50 MG tablet Take 1 tablet (50 mg) by mouth as needed 30 tablet 3     tamoxifen (NOLVADEX) 20 MG tablet Take 1 tablet (20 mg) by mouth daily 90 tablet 3     triamcinolone (KENALOG) 0.025 % ointment Apply topically as needed  3     Triamcinolone Acetonide (AZMACORT IN) Inhale 2 puffs into the lungs as needed       UNABLE TO FIND Take 2 capsules by mouth 3 times daily Muscle Mag. 2 caps contain B1 20mg, B2 20mg, B6 10mg, magesium 20mg, manganese 2mg.       UNABLE TO FIND 3 tablets 3 times daily MEDICATION NAME: calcium D-Glucarate   3 caps contain 180mg of elemental calcium.       UNABLE TO FIND 2 tablets 3 times daily MEDICATION NAME: Digestzymes        UNABLE TO FIND 1 tablet daily MEDICATION NAME: Pure Encapsulations       VENTOLIN  (90 BASE) MCG/ACT Inhaler INHALE 2 PUFFS INTO THE LUNGS EVERY 4 HOURS AS NEEDED FOR SHORTNESS OF BREATH OR DIFFICULT BREATHING OR WHEEZING 18 g 3     Physical Examination:  /61 (BP Location: Left arm, Patient Position: Sitting, Cuff Size: Adult Large)  Pulse 85  Temp 98.8  F (37.1  C) (Oral)  Resp 16  Ht 1.651 m (5' 5\")  Wt 85.3 kg (188 lb)  SpO2 94%  BMI 31.28 kg/m2  Wt Readings from Last 10 Encounters:   09/04/18 85.3 kg (188 lb)   08/29/18 85.6 kg (188 lb 11.2 oz)   06/08/18 82.3 kg (181 lb 7 oz)   05/29/18 84 kg (185 lb 1.6 oz)   05/29/18 84 kg (185 lb 1.6 oz)   02/23/18 89.1 kg (196 lb 8 oz)   02/01/18 86.9 kg (191 lb 8 oz)   01/29/18 81.1 kg (178 lb 14.4 oz)   01/24/18 86.3 kg (190 lb 4.8 oz)   01/22/18 87.2 kg (192 lb 3.9 oz)   Constitutional: NAD  Eyes:  PERRL. No scleral icterus.  ENT: Oral mucosa is moist without lesions " "or thrush.   Cardiovascular: Regular rate and rhythm. Port accessed without surrounding erythema or drainage  Respiratory: Clear to auscultation bilaterally. No wheezes or crackles.  Gastrointestinal: Bowel sounds +. Soft, nontender  Neurologic: normal speech, up to exam table unaided  Skin:No rashes noted.   Extremities: trace lower extremity edema bilaterally, compression stockings in place.    Laboratory Data:  Lab Results   Component Value Date    WBC 3.9 (L) 09/04/2018    ANEU 2.4 09/04/2018    HGB 13.6 09/04/2018    HCT 40.0 09/04/2018     09/04/2018     08/31/2018    POTASSIUM 3.9 08/31/2018    CHLORIDE 102 08/31/2018    CO2 33 (H) 08/31/2018     (H) 08/31/2018    BUN 17 08/31/2018    CR 1.11 (H) 08/31/2018    MAG 2.0 08/31/2018    INR 0.99 12/28/2017    AST 14 08/31/2018    ALT 23 08/31/2018         CT 5/21/2018  IMPRESSION: In this patient with a history of multiple malignancies  including diffuse large B-cell lymphoma, breast cancer, and papillary  thyroid cancer:  1. Treatment related changes in the posterior right 10th rib with  slightly increased sclerosis and minimal residual soft tissue  thickening. No evidence of disease progression or new metastatic  disease in the chest, abdomen or pelvis.  2. Slightly increased intra and extra hepatic biliary ductal  dilatation, may be reservoir effect from prior cholecystectomy,  correlate with liver function tests.  3. Bilateral nonobstructing renal calculi, largest measuring 3 mm in  the inferior pole right kidney.         Assessment and Plan:    DLBCL, \"double-hit\"-like, with c-myc overexpression but not re-arrangement. s/p   6 cycles of DA-R-EPOCH     Complication include mucositis,  infusion reaction (rigors), nausea and fatigue. ONly needed PRBC once. No infecions.       PET scan with CR with no PET avidity on 12/21 and EOT PET on 5/27 confirmed CR.    Elvin had a post-treatment/end of treatment CT/PET in May 2018 which showed a complete " response with resolution of all sites of disease.  The bony area along the tenth rib is stable but has very low level FDG with an SUV of 4.4.  There are no new suspicious osseous lesions.  I am pleased to share with her that she is in CR.    May 2018 - ongoing CR.    Now with worse chest pain symptoms - we will restage and CT scan CAP. Will order today.     ID.  No active infections.  S/p Shignrix vaccine and off acyclovir.     Follow up with Dr. Castro for hypothyroidism.   F/u with  who started Tamoxifen at this time.     History of Rachael en Y gastric bypass  Continue supplements (calcium citrate-vitamin D, vitamin D3, magnesium citrate). Also had iron deficiency anemia likely from poor absorption. S/o iron supplementation. Will monitor Hgb.    Chronic chest wall pain s/p mastectomies  Follows with Dr. Benitez. Palliative care to continue to prescribe pain medications. Taking MS Contin, Oxycodone for breakthrough pain, and celebrex. Continued to encourage pt to wean down on pain meds as tolerated. She appears lethargic  at times during clinica visit.      Garima Sauceda MD  Associate Professor of Medicine  [phone]

## 2018-09-04 NOTE — NURSING NOTE
"Oncology Rooming Note    September 4, 2018 5:23 PM   Tisha Arias is a 57 year old female who presents for:    Chief Complaint   Patient presents with     Port Draw     labs drawn from port by rn.  vstaken     Oncology Clinic Visit     Return : Lymphoma      Initial Vitals: /61 (BP Location: Left arm, Patient Position: Sitting, Cuff Size: Adult Large)  Pulse 85  Temp 98.8  F (37.1  C) (Oral)  Resp 16  Ht 1.651 m (5' 5\")  Wt 85.3 kg (188 lb)  SpO2 94%  BMI 31.28 kg/m2 Estimated body mass index is 31.28 kg/(m^2) as calculated from the following:    Height as of this encounter: 1.651 m (5' 5\").    Weight as of this encounter: 85.3 kg (188 lb). Body surface area is 1.98 meters squared.  Extreme Pain (9) Comment: Data Unavailable   No LMP recorded. Patient has had a hysterectomy.  Allergies reviewed: Yes  Medications reviewed: Yes    Medications: Medication refills not needed today.  Pharmacy name entered into [facility]:    [id number] - [place], [place] - [address] [facility]. [facility] - [place], [place] - [address] [place], [place] [address]LUIS     Clinical concerns: Patient states that her pain is back to where it was when she was first diagnosed.     10 minutes for nursing intake (face to face time)     Qian Cadena CMA              "

## 2018-09-04 NOTE — MR AVS SNAPSHOT
After Visit Summary   9/4/2018    Tisha Arias    MRN: [id number]           Patient Information     Date Of Birth          1960        Visit Information        Provider Department      9/4/2018 5:00 PM Garima Sauceda MD [facility] Blood and Marrow Transplant        Today's Diagnoses     High grade B-cell lymphoma (H)              [facility] ([facility])  [address] [place] [address]  Phone: [phone]  Clinic Hours:   Monday-Thursday:7am to 7pm   Friday: 7am to 5pm   Weekends and holidays:    8am to noon (in general)  If your fever is 100.5  or greater,   call the clinic.  After hours call the   hospital at [phone] or   [phone]. Ask for the BMT   fellow on-call            Follow-ups after your visit        Your next 10 appointments already scheduled     Nov 26, 2018  3:00 PM CST   (Arrive by 2:45 PM)   Return Visit with Shahla Crawley MD   [facility] ([facility])    [address] [place] [address]  [address] [place] [id number] [phone]            Nov 26, 2018  4:40 PM CST   (Arrive by 4:25 PM)   RETURN ENDOCRINE with Avelina Castro MD   [facility] Endocrinology ([facility])    [address] [place] [address]  3rd [facility] [id number] [phone]            Nov 30, 2018 11:30 AM CST   (Arrive by 11:15 AM)   Return Visit with Edu Benitez MD   [facility] ([facility])    [address] [place] [address]  [address] [place] [id number] [phone]              Future tests that were ordered for you today     Open Future Orders        Priority Expected Expires Ordered    CT Chest abdomen pelvis w & w/o contrast Routine 9/5/2018 9/4/2019 9/4/2018            Who to contact     If you have questions or need follow up information about today's clinic visit or your schedule please contact [facility] BLOOD [facility]ARR[facility]ANSPL[facility]  "directly at [phone].  Normal or non-critical lab and imaging results will be communicated to you by WaveSyndicatehart, letter or phone within 4 business days after the clinic has received the results. If you do not hear from us within 7 days, please contact the clinic through [employer] or phone. If you have a critical or abnormal lab result, we will notify you by phone as soon as possible.  Submit refill requests through [employer] or call your pharmacy and they will forward the refill request to us. Please allow 3 business days for your refill to be completed.          Additional Information About Your Visit        WaveSyndicateharY-Klub Information     [employer] gives you secure access to your electronic health record. If you see a primary care provider, you can also send messages to your care team and make appointments. If you have questions, please call your primary care clinic.  If you do not have a primary care provider, please call [phone] and they will assist you.        Care EveryWhere ID     This is your Care EveryWhere ID. This could be used by other organizations to access your [place] medical records  [id number]        Your Vitals Were     Pulse Temperature Respirations Height Pulse Oximetry BMI (Body Mass Index)    85 98.8  F (37.1  C) (Oral) 16 1.651 m (5' 5\") 94% 31.28 kg/m2       Blood Pressure from Last 3 Encounters:   09/04/18 113/61   08/29/18 139/81   06/08/18 (!) 141/92    Weight from Last 3 Encounters:   09/04/18 85.3 kg (188 lb)   08/29/18 85.6 kg (188 lb 11.2 oz)   06/08/18 82.3 kg (181 lb 7 oz)              We Performed the Following     CBC with platelets differential          Today's Medication Changes          These changes are accurate as of 9/4/18  6:17 PM.  If you have any questions, ask your nurse or doctor.               These medicines have changed or have updated prescriptions.        Dose/Directions    cyclobenzaprine 10 MG tablet   Commonly known as:  FLEXERIL   This may have changed:  when to " take this   Used for:  High grade B-cell lymphoma (H)        Dose:  10 mg   Take 1 tablet (10 mg) by mouth 3 times daily as needed   Quantity:  30 tablet   Refills:  0                Recent Review Flowsheet Data     BMT Recent Results Latest Ref Rng & Units 2/16/2018 2/23/2018 2/27/2018 4/19/2018 5/21/2018 8/31/2018 9/4/2018    WBC 4.0 - 11.0 10e9/L 3.2(L) 4.4 - 3.3(L) 3.5(L) 3.5(L) 3.9(L)    Hemoglobin 11.7 - 15.7 g/dL 10.0(L) 10.8(L) - 12.8 12.9 13.8 13.6    Platelet Count 150 - 450 10e9/L 166 182 - 219 187 183 158    Neutrophils (Absolute) 1.6 - 8.3 10e9/L 2.0 2.9 - 1.8 2.0 2.1 2.4    INR 0.86 - 1.14 - - - - - - -    Sodium 133 - 144 mmol/L 141 140 - - 138 140 -    Potassium 3.4 - 5.3 mmol/L 3.6 3.9 - - 3.4 3.9 -    Chloride 94 - 109 mmol/L 105 105 - - 100 102 -    Glucose 70 - 99 mg/dL 137(H) 144(H) 102(H) - 106(H) 108(H) -    Urea Nitrogen 7 - 30 mg/dL 12 12 - - 15 17 -    Creatinine 0.52 - 1.04 mg/dL 0.86 0.78 - - 0.98 1.11(H) -    Calcium (Total) 8.5 - 10.1 mg/dL 8.4(L) 8.4(L) - - 8.6 8.4(L) -    Protein (Total) 6.8 - 8.8 g/dL 5.8(L) - - - 7.2 7.3 -    Albumin 3.4 - 5.0 g/dL 3.0(L) - - - 3.9 4.0 -    Alkaline Phosphatase 40 - 150 U/L 70 - - - 99 97 -    AST 0 - 45 U/L 19 - - - 24 14 -    ALT 0 - 50 U/L 17 - - - 27 23 -    MCV 78 - 100 fl 104(H) 104(H) - 100 94 97 96               Primary Care Provider Office Phone # Fax #    Shahla Crawley [phone]       [address] [place] [id number]        Equal Access to Services     RODRIGO ZAMORA : Hadii sacha marley hadasho Soesteban, waaxda luqadaha, qaybta kaalmada adeegyada, ranjan ozuna hayrajiv martin. So [facility] [phone].    ATENCIÓN: Si habla español, tiene a stiles disposición servicios gratuitos de asistencia lingüística. Oneil al [phone].    We comply with applicable federal civil rights laws and [place] laws. We do not discriminate on the basis of race, color, national origin, age, disability, sex, sexual  orientation, or gender identity.            Thank you!     Thank you for choosing [facility] BLOOD AND MARROW TRANSPLANT  for your care. Our goal is always to provide you with excellent care. Hearing back from our patients is one way we can continue to improve our services. Please take a few minutes to complete the written survey that you may receive in the mail after your visit with us. Thank you!             Your Updated Medication List - Protect others around you: Learn how to safely use, store and throw away your medicines at www.disposemymeds.org.          This list is accurate as of 9/4/18  6:17 PM.  Always use your most recent med list.                   Brand Name Dispense Instructions for use Diagnosis    ACAI RAMIREZ PO      Take 50 mg by mouth        acetaminophen 325 MG tablet    TYLENOL    100 tablet    Take 2 tablets (650 mg) by mouth every 4 hours as needed for mild pain or fever    High grade B-cell lymphoma (H)       acyclovir 400 MG tablet    ZOVIRAX    30 tablet    Take 1 tablet (400 mg) by mouth 2 times daily    High grade B-cell lymphoma (H)       aluminum chloride 20 % external solution    DRYSOL    60 mL    Apply topically At Bedtime    Rash, Intertrigo       AZMACORT IN      Inhale 2 puffs into the lungs as needed        bisacodyl 5 MG EC tablet     30 tablet    Take 3 tablets (15 mg) by mouth daily as needed for constipation    Constipation, unspecified constipation type       calcitRIOL 0.25 MCG capsule    ROCALTROL    90 capsule    TAKE 2 CAPSULES BY MOUTH EVERY MORNING AND TAKE 1 CAPSULE BY MOUTH EVERY NIGHT AT BEDTIME    Postsurgical hypothyroidism, Papillary carcinoma, follicular variant (H), Metastasis to cervical lymph node (H)       calcium Citrate-vitamin D 500-400 MG-UNIT Chew     60 tablet    Take 1 tablet by mouth 3 times daily        celecoxib 200 MG capsule    celeBREX    60 capsule    Take 1 capsule (200 mg) by mouth 2 times daily    Chest wall pain       cetirizine 10 MG tablet     ZYRTEC ALLERGY    90 tablet    Take 1 tablet (10 mg) by mouth 3 times daily On hold for lab test.    High grade B-cell lymphoma (H)       COMPOUND CONTAINING CONTROLLED SUBSTANCE - PHARMACY TO MIX COMPOUNDED MEDICATION    CMPD RX    120 g    Apply small amount to affected area two times daily.    Neoplasm related pain       cromolyn 4 % ophthalmic solution    OPTICROM    10 mL    Place 1 drop into both eyes 4 times daily    Idiopathic mast cell activation syndrome (H)       cromolyn sodium 5.2 MG/ACT Aers Inhaler    NASALCROM    1 Bottle    SPRAY ONE SPRAY( 1 ML) IN NOSTRIL DAILY    Mast cell disease, systemic       [employer] GUMMY BEARS CHILDREN Chew     120 tablet    Take 5 g by mouth daily (2 gummy= 5 g =1 serving)    High grade B-cell lymphoma (H)       cyclobenzaprine 10 MG tablet    FLEXERIL    30 tablet    Take 1 tablet (10 mg) by mouth 3 times daily as needed    High grade B-cell lymphoma (H)       diazepam 5 MG tablet    VALIUM    90 tablet    TAKE ONE TABLET (5 MG) BY MOUTH THREE TIMES DAILY AS NEEDED FOR MUSCLE SPASMS    Chest wall pain       diclofenac 1 % Gel topical gel    VOLTAREN    100 g    Apply affected area two times daily PRN using enclosed dosing card.    Myofascial pain       EPINEPHrine 0.3 MG/0.3ML injection 2-pack    EPIPEN 2-CARIDAD    0.6 mL    Inject 0.3 mLs (0.3 mg) into the muscle once as needed for anaphylaxis    Anaphylaxis, subsequent encounter       fluticasone 50 MCG/ACT spray    FLONASE    1 Bottle    Spray 1-2 sprays into both nostrils daily    Bacterial sinusitis       * furosemide 20 MG tablet    LASIX    60 tablet    Take 1 tablet (20 mg) by mouth 2 times daily    Localized edema       * furosemide 20 MG tablet    LASIX    60 tablet    Take 1 tablet (20 mg) by mouth 2 times daily    Bilateral lower extremity edema       hydrochlorothiazide 12.5 MG capsule    MICROZIDE    30 capsule    Take 1 capsule (12.5 mg) by mouth daily    Hypocalcemia       levofloxacin 750 MG tablet     LEVAQUIN    10 tablet    Take 1 tablet (750 mg) by mouth daily    Bacterial sinusitis       levothyroxine 112 MCG tablet    SYNTHROID/LEVOTHROID    189 tablet    ON MONDAY THRU SATURDAY TAKE TWO TABLETS DAILY AND ON SUNDAY TAKE 3 TABLETS DAILY.    Postsurgical hypothyroidism, Papillary carcinoma, follicular variant (H), Postsurgical hypoparathyroidism (H), Thyroid cancer (H)       lidocaine 5 % ointment    XYLOCAINE    350 g    Apply quarter size amount to chest and back up to 3 times daily as needed for pain.    Chest wall pain       lidocaine visc 2% 2.5mL/5mL & maalox/mylanta w/ simeth 2.5mL/5mL & diphenhydrAMINE 5mg/5mL Susp suspension    MAGIC Mouthwash [facility]OS[facility]ITAL    360 mL    Swish and spit 10 mLs in mouth every 6 hours as needed for mouth sores    Stomatitis and mucositis, High grade B-cell lymphoma (H)       * lidocaine-prilocaine cream    EMLA    30 g    Apply topically as needed (port access pain.)    Neoplasm related pain, Chest wall pain       * lidocaine-prilocaine cream    EMLA    60 g    Apply topically as needed for moderate pain    High grade B-cell lymphoma (H)       magic mouthwash suspension (diphenhydrAMINE, lidocaine, aluminum-magnesium & simethicone) Susp compounding kit     237 mL    Swish and spit 5-10 mLs in mouth every 6 hours as needed for mouth sores    Stomatitis and mucositis, High grade B-cell lymphoma (H)       methocarbamol 750 MG tablet    ROBAXIN    360 tablet    Take 1 tablet (750 mg) by mouth 4 times daily . Ok to take a 5th Robaxin on very severe days.    Myofascial pain       montelukast 10 MG tablet    SINGULAIR    120 tablet    Take 2 tablets (20 mg) by mouth 2 times daily    Idiopathic mast cell activation syndrome (H)       morphine 30 MG 12 hr tablet   Start taking on:  9/10/2018    MS CONTIN    120 tablet    Take 1 tablet (30 mg) by mouth every 8 hours . Take an addition pill at night such that your evening dose is 60mg    Neoplasm related pain       naloxone nasal spray     NARCAN    0.2 mL    Spray 1 spray (4 mg) into one nostril alternating nostrils as needed for opioid reversal every 2-3 minutes until assistance arrives    Encounter for long-term opiate analgesic use       olopatadine 0.1 % ophthalmic solution    PATANOL     Apply 1 drop to eye        * ondansetron 8 MG ODT tab    ZOFRAN-ODT    30 tablet    DISSOLVE ONE TABLET IN MOUTH EVERY EIGHT HOURS AS NEEDED    High grade B-cell lymphoma (H), Nausea and vomiting, intractability of vomiting not specified, unspecified vomiting type, Breast cancer (H)       * ondansetron 8 MG ODT tab    ZOFRAN-ODT    30 tablet    Take 1 tablet (8 mg) by mouth every 8 hours as needed    High grade B-cell lymphoma (H), Nausea and vomiting, intractability of vomiting not specified, unspecified vomiting type       * order for DME     1 Units    Compression stockings, medium grade, please measure and fit. Please dispense a pair.    Peripheral edema       * order for DME     2 Units    Equipment being ordered: compression stockings. 20-30 mm or 30 - 40 mm as patient can tolerate. Physical therapy to determine if they should be above or below the knee.    Venous stasis       * order for DME     2 Device    Equipment being ordered: compression bra    Malignant neoplasm of right female breast (H)       order for DME     1 Units    Compression socks - knee high 20-30 mmHg zippered if possible    Lower extremity edema       * oxyCODONE IR 30 MG tablet    ROXICODONE    60 tablet    Take 1 tablet (30 mg) by mouth every 4 hours as needed for moderate to severe pain    Neoplasm related pain, High grade B-cell lymphoma (H)       * oxyCODONE IR 30 MG tablet   Start taking on:  9/10/2018    ROXICODONE    162 tablet    Take 1 tablet (30 mg) by mouth every 4 hours as needed for moderate to severe pain . This is a 30day supply    Other chronic postprocedural pain, Encounter for long-term opiate analgesic use       polyethylene glycol powder    MIRALAX    510 g     Take 17 g (1 capful) by mouth daily    Acute constipation       potassium chloride SA 20 MEQ CR tablet    KLOR-CON    60 tablet    Take 1 tablet (20 mEq) by mouth 2 times daily    Hypokalemia       PROBIOTIC DAILY PO      Take 1 capsule by mouth daily Lacto acid bifidobacterium    Breast cancer, unspecified laterality, Thyroid cancer (H), Chronic arthralgias of knees and hips, unspecified laterality       prochlorperazine 10 MG tablet    COMPAZINE     Take 10 mg by mouth as needed        ranitidine 75 MG tablet    ZANTAC    90 tablet    Take 1 tablet (75 mg) by mouth 3 times daily    Mast cell disease, systemic       senna-docusate 8.6-50 MG per tablet    SENOKOT-S;PERICOLACE    60 tablet    Take 1-2 tablets by mouth 2 times daily as needed for constipation    Acute constipation       SUMAtriptan 50 MG tablet    IMITREX    30 tablet    Take 1 tablet (50 mg) by mouth as needed    Intractable persistent migraine aura without cerebral infarction and without status migrainosus       tamoxifen 20 MG tablet    NOLVADEX    90 tablet    Take 1 tablet (20 mg) by mouth daily    Ductal carcinoma in situ (DCIS) of breast, unspecified laterality       triamcinolone 0.025 % ointment    KENALOG     Apply topically as needed        UNABLE TO FIND      3 tablets 3 times daily MEDICATION NAME: calcium D-Glucarate  3 caps contain 180mg of elemental calcium.        UNABLE TO FIND      Take 2 capsules by mouth 3 times daily Muscle Mag. 2 caps contain B1 20mg, B2 20mg, B6 10mg, magesium 20mg, manganese 2mg.        UNABLE TO FIND      2 tablets 3 times daily MEDICATION NAME: Digestzymes    Thyroid cancer (H), Postsurgical hypothyroidism, Postsurgical hypoparathyroidism (H)       UNABLE TO FIND      1 tablet daily MEDICATION NAME: Pure Encapsulations    Thyroid cancer (H), Postsurgical hypothyroidism, Postsurgical hypoparathyroidism (H)       VENTOLIN  (90 Base) MCG/ACT inhaler   Generic drug:  albuterol     18 g    INHALE 2 PUFFS  INTO THE LUNGS EVERY 4 HOURS AS NEEDED FOR SHORTNESS OF BREATH OR DIFFICULT BREATHING OR WHEEZING    Mild intermittent asthma without complication       vitamin D3 2000 units Caps     60 capsule    Take 5,000 Units by mouth daily Takes 2 tabs daily    Thyroid cancer (H), Postsurgical hypothyroidism, Papillary carcinoma, follicular variant (H), Postsurgical hypoparathyroidism (H)       * Notice:  This list has 11 medication(s) that are the same as other medications prescribed for you. Read the directions carefully, and ask your doctor or other care provider to review them with you.

## 2018-09-04 NOTE — NURSING NOTE
"Chief Complaint   Patient presents with     Port Draw     labs drawn from port by rn.  vstaken     Port accessed with 20 gauge 3/4\" gripper needle and labs drawn by rn.  Port flushed with NS and heparin.  Pt tolerated well.  VS taken.  Pt checked in for next appt.  Basilia Schulz RN      "

## 2018-09-04 NOTE — LETTER
"9/4/2018       RE: Tisha Arias  [address] [place] [address]     Dear Colleague,    Thank you for referring your patient, Tisha Arias, to the [facility] at [facility]. Please see a copy of my visit note below.    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 She is currently undergoing treatment with DA-R-EPOCH. Her past medical history is significant for breast cancer in 2011 s/p bilateral mastectomies and BENJIE/BSO up until recently on Tamoxifen, papillary thyroid cancer s/p total thyroidectomy, chronic chest wall pain, and asthma. Briefly, her oncologic history is as follows:    She presented in 8/2017 with dramatically worse chest and back pain. CT 9/1/17 showed lytic lesion right 10th rib extending to right T9-10 neural foramen with vertebral body invasion. There was associated conglomerate of lymphadenopathy around the mid T-spine. She had a CT guided biopsy showing B-cell high grade lymphoma. Pathology showed \"The morphology shows a population of large cells, diffuse lymphoid infiltrate. The cells are atypical with abundant mitotic and apoptotic activity and single cell necrosis. Tumor is CD45 and CD79a positive and focally weakly CD30 positive. The other stains revealed positivity for CD20, BCL6, BCL2 and MUM1, and CD10 and CD21 negative. The CD5 and CD3 highlighted background T-cells.  MYC expression was equivocal with approximately 40% nuclei staining.  Ki-67 proliferative index is greater than 90-95%. The immunohistochemistry is suggestive of non-GCB immunophenotype of diffuse large B-cell lymphoma. The cytogenetics did not show rearrangement of the BCL2 or c-MYC. There is the presence of BCL6 rearrangement in 78% of cells and gains of MYC and BCL2, but no " "amplifications.\"     Staging LP and BMBx were negative for lymphoma.   She started DA-REPOCH 10/6/17. She did well with chemo other than rigors during CIVI.  Post cycle 1 complicated by mucositis and worsening of chronic LE peripheral edema.   Cycle 2 on 10/27/17. Due to a rise in her LD and uric acid levels on that admission day, she underwent a CT scan which showed response to therapy with improvement in her mediastinal lymphadenopathy and right chest wall mass.  Cycle 3 on 11/16      Cycle 4 12/7  C5,6 end of January/208    Interval History:  She reports that she is unwell for past month and has more chest pain and upper back pain. Drenching sweats on and off. Also worse neuropathy.   No lumps or abdominal symptoms.     Current Outpatient Prescriptions   Medication Sig Dispense Refill     ACAI RAMIREZ PO Take 50 mg by mouth       acetaminophen (TYLENOL) 325 MG tablet Take 2 tablets (650 mg) by mouth every 4 hours as needed for mild pain or fever 100 tablet      acyclovir (ZOVIRAX) 400 MG tablet Take 1 tablet (400 mg) by mouth 2 times daily 30 tablet 0     aluminum chloride (DRYSOL) 20 % external solution Apply topically At Bedtime 60 mL 3     bisacodyl (DULCOLAX) 5 MG EC tablet Take 3 tablets (15 mg) by mouth daily as needed for constipation 30 tablet 0     calcitRIOL (ROCALTROL) 0.25 MCG capsule TAKE 2 CAPSULES BY MOUTH EVERY MORNING AND TAKE 1 CAPSULE BY MOUTH EVERY NIGHT AT BEDTIME 90 capsule 11     calcium Citrate-vitamin D 500-400 MG-UNIT CHEW Take 1 tablet by mouth 3 times daily 60 tablet      celecoxib (CELEBREX) 200 MG capsule Take 1 capsule (200 mg) by mouth 2 times daily 60 capsule 3     cetirizine (ZYRTEC ALLERGY) 10 MG tablet Take 1 tablet (10 mg) by mouth 3 times daily On hold for lab test. 90 tablet 0     Cholecalciferol (VITAMIN D3) 2000 UNITS CAPS Take 5,000 Units by mouth daily Takes 2 tabs daily 60 capsule 0     COMPOUND CONTAINING CONTROLLED SUBSTANCE (CMPD RX) - PHARMACY TO MIX COMPOUNDED " MEDICATION Apply small amount to affected area two times daily. 120 g 6     cromolyn (OPTICROM) 4 % ophthalmic solution Place 1 drop into both eyes 4 times daily 10 mL 0     cromolyn sodium (NASALCROM) 5.2 MG/ACT AERS Inhaler SPRAY ONE SPRAY( 1 ML) IN NOSTRIL DAILY 1 Bottle 6     [employer] FIBER GUMMY BEARS CHILDREN CHEW Take 5 g by mouth daily (2 gummy= 5 g =1 serving) 120 tablet 3     cyclobenzaprine (FLEXERIL) 10 MG tablet Take 1 tablet (10 mg) by mouth 3 times daily as needed (Patient taking differently: Take 10 mg by mouth 3 times daily ) 30 tablet 0     diazepam (VALIUM) 5 MG tablet TAKE ONE TABLET (5 MG) BY MOUTH THREE TIMES DAILY AS NEEDED FOR MUSCLE SPASMS 90 tablet 1     diclofenac (VOLTAREN) 1 % GEL topical gel Apply affected area two times daily PRN using enclosed dosing card. 100 g 3     EPINEPHrine (EPIPEN 2-CARIDAD) 0.3 MG/0.3ML injection 2-pack Inject 0.3 mLs (0.3 mg) into the muscle once as needed for anaphylaxis 0.6 mL 3     fluticasone (FLONASE) 50 MCG/ACT spray Spray 1-2 sprays into both nostrils daily 1 Bottle 11     furosemide (LASIX) 20 MG tablet Take 1 tablet (20 mg) by mouth 2 times daily 60 tablet 3     furosemide (LASIX) 20 MG tablet Take 1 tablet (20 mg) by mouth 2 times daily 60 tablet 0     hydrochlorothiazide (MICROZIDE) 12.5 MG capsule Take 1 capsule (12.5 mg) by mouth daily 30 capsule 0     levofloxacin (LEVAQUIN) 750 MG tablet Take 1 tablet (750 mg) by mouth daily 10 tablet 0     levothyroxine (SYNTHROID/LEVOTHROID) 112 MCG tablet ON MONDAY THRU SATURDAY TAKE TWO TABLETS DAILY AND ON SUNDAY TAKE 3 TABLETS DAILY. 189 tablet 3     lidocaine (XYLOCAINE) 5 % ointment Apply quarter size amount to chest and back up to 3 times daily as needed for pain. 350 g 1     lidocaine-prilocaine (EMLA) cream Apply topically as needed for moderate pain 60 g 1     lidocaine-prilocaine (EMLA) cream Apply topically as needed (port access pain.) 30 g 1     magic mouthwash (FIRST-MOUTHWASH BLM) suspension Swish and  spit 5-10 mLs in mouth every 6 hours as needed for mouth sores 237 mL 1     magic mouthwash suspension (diphenhydrAMINE, lidocaine, aluminum-magnesium & simethicone) Swish and spit 10 mLs in mouth every 6 hours as needed for mouth sores 360 mL 1     methocarbamol (ROBAXIN) 750 MG tablet Take 1 tablet (750 mg) by mouth 4 times daily . Ok to take a 5th Robaxin on very severe days. 360 tablet 1     montelukast (SINGULAIR) 10 MG tablet Take 2 tablets (20 mg) by mouth 2 times daily 120 tablet 11     [START ON 9/10/2018] morphine (MS CONTIN) 30 MG 12 hr tablet Take 1 tablet (30 mg) by mouth every 8 hours . Take an addition pill at night such that your evening dose is 60mg 120 tablet 0     naloxone (NARCAN) nasal spray Spray 1 spray (4 mg) into one nostril alternating nostrils as needed for opioid reversal every 2-3 minutes until assistance arrives 0.2 mL 0     olopatadine (PATANOL) 0.1 % ophthalmic solution Apply 1 drop to eye       ondansetron (ZOFRAN-ODT) 8 MG ODT tab Take 1 tablet (8 mg) by mouth every 8 hours as needed 30 tablet 1     ondansetron (ZOFRAN-ODT) 8 MG ODT tab DISSOLVE ONE TABLET IN MOUTH EVERY EIGHT HOURS AS NEEDED  30 tablet 3     order for DME Compression socks - knee high 20-30 mmHg zippered if possible 1 Units 11     order for DME Equipment being ordered: compression stockings. 20-30 mm or 30 - 40 mm as patient can tolerate. Physical therapy to determine if they should be above or below the knee. 2 Units 4     order for DME Equipment being ordered: compression bra 2 Device 1     order for DME Compression stockings, medium grade, please measure and fit. Please dispense a pair. 1 Units 0     [START ON 9/10/2018] oxyCODONE IR (ROXICODONE) 30 MG tablet Take 1 tablet (30 mg) by mouth every 4 hours as needed for moderate to severe pain . This is a 30day supply 162 tablet 0     oxyCODONE IR (ROXICODONE) 30 MG tablet Take 1 tablet (30 mg) by mouth every 4 hours as needed for moderate to severe pain 60 tablet  "0     polyethylene glycol (MIRALAX) powder Take 17 g (1 capful) by mouth daily 510 g 0     potassium chloride SA (KLOR-CON) 20 MEQ CR tablet Take 1 tablet (20 mEq) by mouth 2 times daily 60 tablet 3     Probiotic Product (PROBIOTIC DAILY PO) Take 1 capsule by mouth daily Lacto acid bifidobacterium       prochlorperazine (COMPAZINE) 10 MG tablet Take 10 mg by mouth as needed  3     ranitidine (ZANTAC) 75 MG tablet Take 1 tablet (75 mg) by mouth 3 times daily 90 tablet 0     senna-docusate (SENOKOT-S;PERICOLACE) 8.6-50 MG per tablet Take 1-2 tablets by mouth 2 times daily as needed for constipation 60 tablet 0     SUMAtriptan (IMITREX) 50 MG tablet Take 1 tablet (50 mg) by mouth as needed 30 tablet 3     tamoxifen (NOLVADEX) 20 MG tablet Take 1 tablet (20 mg) by mouth daily 90 tablet 3     triamcinolone (KENALOG) 0.025 % ointment Apply topically as needed  3     Triamcinolone Acetonide (AZMACORT IN) Inhale 2 puffs into the lungs as needed       UNABLE TO FIND Take 2 capsules by mouth 3 times daily Muscle Mag. 2 caps contain B1 20mg, B2 20mg, B6 10mg, magesium 20mg, manganese 2mg.       UNABLE TO FIND 3 tablets 3 times daily MEDICATION NAME: calcium D-Glucarate   3 caps contain 180mg of elemental calcium.       UNABLE TO FIND 2 tablets 3 times daily MEDICATION NAME: Digestzymes        UNABLE TO FIND 1 tablet daily MEDICATION NAME: Pure Encapsulations       VENTOLIN  (90 BASE) MCG/ACT Inhaler INHALE 2 PUFFS INTO THE LUNGS EVERY 4 HOURS AS NEEDED FOR SHORTNESS OF BREATH OR DIFFICULT BREATHING OR WHEEZING 18 g 3     Physical Examination:  /61 (BP Location: Left arm, Patient Position: Sitting, Cuff Size: Adult Large)  Pulse 85  Temp 98.8  F (37.1  C) (Oral)  Resp 16  Ht 1.651 m (5' 5\")  Wt 85.3 kg (188 lb)  SpO2 94%  BMI 31.28 kg/m2  Wt Readings from Last 10 Encounters:   09/04/18 85.3 kg (188 lb)   08/29/18 85.6 kg (188 lb 11.2 oz)   06/08/18 82.3 kg (181 lb 7 oz)   05/29/18 84 kg (185 lb 1.6 oz) " "  05/29/18 84 kg (185 lb 1.6 oz)   02/23/18 89.1 kg (196 lb 8 oz)   02/01/18 86.9 kg (191 lb 8 oz)   01/29/18 81.1 kg (178 lb 14.4 oz)   01/24/18 86.3 kg (190 lb 4.8 oz)   01/22/18 87.2 kg (192 lb 3.9 oz)   Constitutional: NAD  Eyes:  PERRL. No scleral icterus.  ENT: Oral mucosa is moist without lesions or thrush.   Cardiovascular: Regular rate and rhythm. Port accessed without surrounding erythema or drainage  Respiratory: Clear to auscultation bilaterally. No wheezes or crackles.  Gastrointestinal: Bowel sounds +. Soft, nontender  Neurologic: normal speech, up to exam table unaided  Skin:No rashes noted.   Extremities: trace lower extremity edema bilaterally, compression stockings in place.    Laboratory Data:  Lab Results   Component Value Date    WBC 3.9 (L) 09/04/2018    ANEU 2.4 09/04/2018    HGB 13.6 09/04/2018    HCT 40.0 09/04/2018     09/04/2018     08/31/2018    POTASSIUM 3.9 08/31/2018    CHLORIDE 102 08/31/2018    CO2 33 (H) 08/31/2018     (H) 08/31/2018    BUN 17 08/31/2018    CR 1.11 (H) 08/31/2018    MAG 2.0 08/31/2018    INR 0.99 12/28/2017    AST 14 08/31/2018    ALT 23 08/31/2018         CT 5/21/2018  IMPRESSION: In this patient with a history of multiple malignancies  including diffuse large B-cell lymphoma, breast cancer, and papillary  thyroid cancer:  1. Treatment related changes in the posterior right 10th rib with  slightly increased sclerosis and minimal residual soft tissue  thickening. No evidence of disease progression or new metastatic  disease in the chest, abdomen or pelvis.  2. Slightly increased intra and extra hepatic biliary ductal  dilatation, may be reservoir effect from prior cholecystectomy,  correlate with liver function tests.  3. Bilateral nonobstructing renal calculi, largest measuring 3 mm in  the inferior pole right kidney.         Assessment and Plan:    DLBCL, \"double-hit\"-like, with c-myc overexpression but not re-arrangement. s/p   6 cycles of " DA-R-EPOCH     Complication include mucositis,  infusion reaction (rigors), nausea and fatigue. ONly needed PRBC once. No infecions.       PET scan with CR with no PET avidity on 12/21 and EOT PET on 5/27 confirmed CR.    Elvin had a post-treatment/end of treatment CT/PET in May 2018 which showed a complete response with resolution of all sites of disease.  The bony area along the tenth rib is stable but has very low level FDG with an SUV of 4.4.  There are no new suspicious osseous lesions.  I am pleased to share with her that she is in CR.    May 2018 - ongoing CR.    Now with worse chest pain symptoms - we will restage and CT scan CAP. Will order today.     ID.  No active infections.  S/p Shignrix vaccine and off acyclovir.     Follow up with Dr. Castro for hypothyroidism.   F/u with  who started Tamoxifen at this time.     History of Rachael en Y gastric bypass  Continue supplements (calcium citrate-vitamin D, vitamin D3, magnesium citrate). Also had iron deficiency anemia likely from poor absorption. S/o iron supplementation. Will monitor Hgb.    Chronic chest wall pain s/p mastectomies  Follows with Dr. Benitez. Palliative care to continue to prescribe pain medications. Taking MS Contin, Oxycodone for breakthrough pain, and celebrex. Continued to encourage pt to wean down on pain meds as tolerated. She appears lethargic  at times during clinica visit.      Garima Sauceda MD  Associate Professor of Medicine  [phone]

## 2018-09-05 LAB — PAIN DRUG SCR UR W RPTD MEDS: NORMAL

## 2018-09-05 RX ORDER — FUROSEMIDE 20 MG
TABLET ORAL
Qty: 60 TABLET | Refills: 3 | Status: SHIPPED | OUTPATIENT
Start: 2018-09-05 | End: 2018-11-26

## 2018-09-07 ENCOUNTER — TELEPHONE (OUTPATIENT)
Dept: PALLIATIVE CARE | Facility: CLINIC | Age: 58
End: 2018-09-07

## 2018-09-07 ENCOUNTER — RADIANT APPOINTMENT (OUTPATIENT)
Dept: CT IMAGING | Facility: CLINIC | Age: 58
End: 2018-09-07
Payer: COMMERCIAL

## 2018-09-07 DIAGNOSIS — C85.10 HIGH GRADE B-CELL LYMPHOMA (H): ICD-10-CM

## 2018-09-07 PROCEDURE — 25000128 H RX IP 250 OP 636

## 2018-09-07 PROCEDURE — 36591 DRAW BLOOD OFF VENOUS DEVICE: CPT

## 2018-09-07 RX ORDER — IOPAMIDOL 755 MG/ML
115 INJECTION, SOLUTION INTRAVASCULAR ONCE
Status: COMPLETED | OUTPATIENT
Start: 2018-09-07 | End: 2018-09-07

## 2018-09-07 RX ORDER — HEPARIN SODIUM (PORCINE) LOCK FLUSH IV SOLN 100 UNIT/ML 100 UNIT/ML
5 SOLUTION INTRAVENOUS ONCE
Status: COMPLETED | OUTPATIENT
Start: 2018-09-07 | End: 2018-09-07

## 2018-09-07 RX ADMIN — SODIUM CHLORIDE, PRESERVATIVE FREE 5 ML: 5 INJECTION INTRAVENOUS at 07:22

## 2018-09-07 RX ADMIN — Medication 5 ML: at 06:54

## 2018-09-07 RX ADMIN — IOPAMIDOL 115 ML: 755 INJECTION, SOLUTION INTRAVASCULAR at 07:05

## 2018-09-07 NOTE — DISCHARGE INSTRUCTIONS

## 2018-09-07 NOTE — NURSING NOTE
Chief Complaint   Patient presents with     Port Draw     labs drawn via port by RN     There were no vitals taken for this visit.    Port accessed by RN. Labs collected and sent. Line flushed with NS & Heparin. Pt tolerated well.    Crystal Saba RN

## 2018-09-07 NOTE — TELEPHONE ENCOUNTER
Patient called as she noticed her next scripts are dated for 9/10/2018, however her last scripts were received on 8/10/2018 and there are 31 days in August so she will need them a day early.     Spoke with the pharmacy - gave verbal okay to pharmacist to fill script Saturday 9/8/2018 (pharmacy closed Sunday 9/9/2018).     Advised patient this was done. Dr. Benitez aware of date changes.

## 2018-09-07 NOTE — NURSING NOTE
Chief Complaint   Patient presents with     Lab Only     Port flushed with saline and heparin and de-accessed by RN.     Port flushed with saline and heparin and de-accessed by RN.    Daly Anderson, RN

## 2018-09-13 ENCOUNTER — TELEPHONE (OUTPATIENT)
Dept: PALLIATIVE CARE | Facility: CLINIC | Age: 58
End: 2018-09-13

## 2018-09-13 ENCOUNTER — MYC MEDICAL ADVICE (OUTPATIENT)
Dept: FAMILY MEDICINE | Facility: CLINIC | Age: 58
End: 2018-09-13

## 2018-09-13 DIAGNOSIS — R07.89 CHEST WALL PAIN: ICD-10-CM

## 2018-09-13 DIAGNOSIS — S50.819A CAT SCRATCH OF FOREARM, UNSPECIFIED LATERALITY, INITIAL ENCOUNTER: Primary | ICD-10-CM
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
## 2018-09-13 NOTE — TELEPHONE ENCOUNTER
Received request from pharmacy requesting refill of celebrex.     Last refill: 5/25/2018  Last office visit: 8/29/2018  Scheduled for follow up 11/30/2018     Will route request to MD for review.     Reviewed [place]  Report.

## 2018-09-14 RX ORDER — CELECOXIB 200 MG/1
CAPSULE ORAL
Qty: 60 CAPSULE | Refills: 2 | Status: SHIPPED | OUTPATIENT
Start: 2018-09-14 | End: 2019-03-20

## 2018-09-14 RX ORDER — AZITHROMYCIN 250 MG/1
TABLET, FILM COATED ORAL
Qty: 6 TABLET | Refills: 0 | Status: SHIPPED | OUTPATIENT
Start: 2018-09-14 | End: 2019-01-29

## 2018-09-25 ENCOUNTER — MYC MEDICAL ADVICE (OUTPATIENT)
Dept: PALLIATIVE CARE | Facility: CLINIC | Age: 58
End: 2018-09-25

## 2018-09-25 DIAGNOSIS — G89.28 OTHER CHRONIC POSTPROCEDURAL PAIN: ICD-10-CM

## 2018-09-25 DIAGNOSIS — R07.89 CHEST WALL PAIN: ICD-10-CM

## 2018-09-25 DIAGNOSIS — G89.3 NEOPLASM RELATED PAIN: ICD-10-CM

## 2018-09-25 DIAGNOSIS — Z79.891 ENCOUNTER FOR LONG-TERM OPIATE ANALGESIC USE: ICD-10-CM

## 2018-10-03 RX ORDER — MORPHINE SULFATE 30 MG/1
30 TABLET, FILM COATED, EXTENDED RELEASE ORAL EVERY 8 HOURS
Qty: 120 TABLET | Refills: 0 | Status: SHIPPED | OUTPATIENT
Start: 2018-10-05 | End: 2018-10-30

## 2018-10-03 RX ORDER — OXYCODONE HYDROCHLORIDE 30 MG/1
30 TABLET ORAL EVERY 4 HOURS PRN
Qty: 146 TABLET | Refills: 0 | Status: SHIPPED | OUTPATIENT
Start: 2018-10-05 | End: 2018-10-30

## 2018-10-03 RX ORDER — DIAZEPAM 5 MG
TABLET ORAL
Qty: 90 TABLET | Refills: 1 | Status: SHIPPED | OUTPATIENT
Start: 2018-10-08 | End: 2018-11-29

## 2018-10-03 NOTE — TELEPHONE ENCOUNTER
"Patient called clinic to make sure her pain medications would be ready for  on 10/5 -- she also noted that her pain meds are not controlling her pain and this is affecting her job. She also needs her follow up apt changed to a 7AM slot, message sent to scheduling for assistance with this.     Last refills pain meds (MS Contin & oxycodone): 9/10/2018  Last refill diazepam: 8/1/2018  Last office visit: 8/29/2018  *Message sent to scheduling for assistance.     Will route to MD for review. Approved scripts to [facility] -- note pharmacy would like scripts to be filled on a Friday as when they are filled on Saturdays frequently they will have issues and have no one to contact.     [place]  Report reviewed.    Per last note: \"At the end of the day I can make her believe that but I am beginning tapering today-reducing her oxycodone by 10% a month.\"  "

## 2018-10-11 ENCOUNTER — OFFICE VISIT (OUTPATIENT)
Dept: FAMILY MEDICINE | Facility: CLINIC | Age: 58
End: 2018-10-11
Payer: COMMERCIAL

## 2018-10-11 DIAGNOSIS — G89.28 OTHER CHRONIC POSTPROCEDURAL PAIN: ICD-10-CM

## 2018-10-11 DIAGNOSIS — C85.10 HIGH GRADE B-CELL LYMPHOMA (H): ICD-10-CM

## 2018-10-11 DIAGNOSIS — G62.0 DRUG-INDUCED POLYNEUROPATHY (H): Primary | ICD-10-CM

## 2018-10-11 PROCEDURE — 99213 OFFICE O/P EST LOW 20 MIN: CPT | Performed by: FAMILY MEDICINE

## 2018-10-11 NOTE — MR AVS SNAPSHOT
After Visit Summary   10/11/2018    Tisha Arias    MRN: [id number]           Patient Information     Date Of Birth          1960        Visit Information        Provider Department      10/11/2018 6:00 PM Pablo Soto MD [facility]        Today's Diagnoses     Other chronic postprocedural pain    -  1    High grade B-cell lymphoma (H)        Drug-induced polyneuropathy (H)           Follow-ups after your visit        Additional Services     PALLIATIVE CARE REFERRAL       Your provider has referred you to Palliative Care Services.    To schedule an Outpatient Palliative Care Referral appointment, please call: [facility]UST: [facility] and [facility] [phone]   [email or web address].    Please be aware that coverage of these services is subject to the terms and limitations of your health insurance plan.  Call member services at your health plan with any benefit or coverage questions.      Please bring the following with you to your appointment:    (1) Any X-Rays, CTs or MRIs which have been performed.  Contact the facility where they were done to arrange for  prior to your scheduled appointment.   (2) If you have recently seen a provider outside of the [facility], please bring your most recent clinic note and/or imaging results  (3) List of current medications - please bring ALL of the medications that you are currently taking (in their original bottles) to your appointment  (4) This referral request  (5) Any documents/labs given to you for this referral    Services Requested: Evaluate and treat symptoms (including writing prescriptions)    Please complete the following questions:  1. What is patient's life-limiting diagnosis? Breast cancer bilateral mastectomy with chronic chest wall pain, thyroid cancer s/p surgery 2013, Aggressive B cell NHL last chemo was Jan 2018 with  residual neuropathic leg pain  2. What is the reason for the referral? Chronic pain medication management  3. What is the patient's prognosis? Unknown    Palliative Care Definition:    Palliative Care is specialized medical care for people with serious illness.  This type of care is focused on providing patients with relief from symptoms, pain and stresses of serious illness - whatever the diagnosis may be.  The goal of Palliative Care is to improve quality of life for both the patient and the family.  Palliative Care is provided by a team of doctors, nurses and other specialists who work with the patient's other doctors to provide an extra layer of support.  Palliative Care is appropriate at any age and at any stage in a serious illness, and can be provided together with curative treatment.                  Follow-up notes from your care team     Return in about 3 months (around 1/11/2019).      Your next 10 appointments already scheduled     Nov 26, 2018  3:00 PM CST   (Arrive by 2:45 PM)   Return Visit with Shahla Crawley MD   [facility] ([facility])    [address] [place] [address]  [address]  [facility] [id number] [phone]            Nov 26, 2018  4:40 PM CST   (Arrive by 4:25 PM)   RETURN ENDOCRINE with Avelina Castro MD   [facility] ([facility])    [address] [place] [address]  3rd Floor  [facility] [id number] [phone]            Nov 28, 2018  7:00 AM CST   (Arrive by 6:45 AM)   Return Visit with Edu Benitez MD   [facility] ([facility])    [address] [place] [address]  [address]  [facility] [id number] [phone]              Who to contact     If you have questions or need follow up information about today's clinic visit or your schedule please contact [facility] directly at [phone].  Normal or non-critical lab and imaging results will be  communicated to you by ulikehart, letter or phone within 4 business days after the clinic has received the results. If you do not hear from us within 7 days, please contact the clinic through [employer] or phone. If you have a critical or abnormal lab result, we will notify you by phone as soon as possible.  Submit refill requests through [employer] or call your pharmacy and they will forward the refill request to us. Please allow 3 business days for your refill to be completed.          Additional Information About Your Visit        [employer] Information     [employer] gives you secure access to your electronic health record. If you see a primary care provider, you can also send messages to your care team and make appointments. If you have questions, please call your primary care clinic.  If you do not have a primary care provider, please call [phone] and they will assist you.        Care EveryWhere ID     This is your Care EveryWhere ID. This could be used by other organizations to access your [place] medical records  [id number]         Blood Pressure from Last 3 Encounters:   09/04/18 113/61   08/29/18 139/81   06/08/18 (!) 141/92    Weight from Last 3 Encounters:   09/04/18 188 lb (85.3 kg)   08/29/18 188 lb 11.2 oz (85.6 kg)   06/08/18 181 lb 7 oz (82.3 kg)              We Performed the Following     PALLIATIVE CARE REFERRAL          Today's Medication Changes          These changes are accurate as of 10/11/18  7:19 PM.  If you have any questions, ask your nurse or doctor.               These medicines have changed or have updated prescriptions.        Dose/Directions    cyclobenzaprine 10 MG tablet   Commonly known as:  FLEXERIL   This may have changed:  when to take this   Used for:  High grade B-cell lymphoma (H)        Dose:  10 mg   Take 1 tablet (10 mg) by mouth 3 times daily as needed   Quantity:  30 tablet   Refills:  0                Primary Care Provider Office Phone # Fax #    Pablo Ng  MD Charles [phone]       [address] [facility]  [facility] [address]        Equal Access to Services     RODRIGO ZAMORA : Kevin aad ku hadmarylu Venegas, girish robertooluha, timmy kafuad lama, ranjan padron lajustinoumm martin. So [facility] [phone].    ATENCIÓN: Si habla español, tiene a stiles disposición servicios gratuitos de asistencia lingüística. Llame al [phone].    We comply with applicable federal civil rights laws and [place] laws. We do not discriminate on the basis of race, color, national origin, age, disability, sex, sexual orientation, or gender identity.            Thank you!     Thank you for choosing [facility]  for your care. Our goal is always to provide you with excellent care. Hearing back from our patients is one way we can continue to improve our services. Please take a few minutes to complete the written survey that you may receive in the mail after your visit with us. Thank you!             Your Updated Medication List - Protect others around you: Learn how to safely use, store and throw away your medicines at www.disposemymeds.org.          This list is accurate as of 10/11/18  7:19 PM.  Always use your most recent med list.                   Brand Name Dispense Instructions for use Diagnosis    ACAI RAMIREZ PO      Take 50 mg by mouth        acetaminophen 325 MG tablet    TYLENOL    100 tablet    Take 2 tablets (650 mg) by mouth every 4 hours as needed for mild pain or fever    High grade B-cell lymphoma (H)       acyclovir 400 MG tablet    ZOVIRAX    30 tablet    Take 1 tablet (400 mg) by mouth 2 times daily    High grade B-cell lymphoma (H)       aluminum chloride 20 % external solution    DRYSOL    60 mL    Apply topically At Bedtime    Rash, Intertrigo       azithromycin 250 MG tablet    ZITHROMAX    6 tablet    Two tablets first day, then one tablet daily for four days.    Cat scratch of forearm, unspecified laterality, initial encounter        AZMACORT IN      Inhale 2 puffs into the lungs as needed        bisacodyl 5 MG EC tablet     30 tablet    Take 3 tablets (15 mg) by mouth daily as needed for constipation    Constipation, unspecified constipation type       calcitRIOL 0.25 MCG capsule    ROCALTROL    90 capsule    TAKE 2 CAPSULES BY MOUTH EVERY MORNING AND TAKE 1 CAPSULE BY MOUTH EVERY NIGHT AT BEDTIME    Postsurgical hypothyroidism, Papillary carcinoma, follicular variant (H), Metastasis to cervical lymph node (H)       calcium Citrate-vitamin D 500-400 MG-UNIT Chew     60 tablet    Take 1 tablet by mouth 3 times daily        celecoxib 200 MG capsule    celeBREX    60 capsule    Take 1 capsule (200 mg) by mouth 2 times daily    Chest wall pain       cetirizine 10 MG tablet    ZYRTEC ALLERGY    90 tablet    Take 1 tablet (10 mg) by mouth 3 times daily On hold for lab test.    High grade B-cell lymphoma (H)       COMPOUND CONTAINING CONTROLLED SUBSTANCE - PHARMACY TO MIX COMPOUNDED MEDICATION    CMPD RX    120 g    Apply small amount to affected area two times daily.    Neoplasm related pain       cromolyn 4 % ophthalmic solution    OPTICROM    10 mL    Place 1 drop into both eyes 4 times daily    Idiopathic mast cell activation syndrome (H)       cromolyn sodium 5.2 MG/ACT Aers Inhaler    NASALCROM    1 Bottle    SPRAY ONE SPRAY( 1 ML) IN NOSTRIL DAILY    Mast cell disease, systemic       [employer] G[employer]IL[employer] Chew     120 tablet    Take 5 g by mouth daily (2 gummy= 5 g =1 serving)    High grade B-cell lymphoma (H)       cyclobenzaprine 10 MG tablet    FLEXERIL    30 tablet    Take 1 tablet (10 mg) by mouth 3 times daily as needed    High grade B-cell lymphoma (H)       diazepam 5 MG tablet    VALIUM    90 tablet    TAKE ONE TABLET (5 MG) BY MOUTH THREE TIMES DAILY AS NEEDED FOR MUSCLE SPASMS    Chest wall pain       diclofenac 1 % Gel topical gel    VOLTAREN    100 g    Apply affected area two times daily PRN using enclosed dosing card.     Myofascial pain       EPINEPHrine 0.3 MG/0.3ML injection 2-pack    EPIPEN 2-CARIDAD    0.6 mL    Inject 0.3 mLs (0.3 mg) into the muscle once as needed for anaphylaxis    Anaphylaxis, subsequent encounter       fluticasone 50 MCG/ACT spray    FLONASE    1 Bottle    Spray 1-2 sprays into both nostrils daily    Bacterial sinusitis       * furosemide 20 MG tablet    LASIX    60 tablet    Take 1 tablet (20 mg) by mouth 2 times daily    Localized edema       * furosemide 20 MG tablet    LASIX    60 tablet    Take 1 tablet (20 mg) by mouth 2 times daily    Bilateral lower extremity edema       hydrochlorothiazide 12.5 MG capsule    MICROZIDE    30 capsule    Take 1 capsule (12.5 mg) by mouth daily    Hypocalcemia       levofloxacin 750 MG tablet    LEVAQUIN    10 tablet    Take 1 tablet (750 mg) by mouth daily    Bacterial sinusitis       levothyroxine 112 MCG tablet    SYNTHROID/LEVOTHROID    189 tablet    ON MONDAY THRU SATURDAY TAKE TWO TABLETS DAILY AND ON SUNDAY TAKE 3 TABLETS DAILY.    Postsurgical hypothyroidism, Papillary carcinoma, follicular variant (H), Postsurgical hypoparathyroidism (H), Thyroid cancer (H)       lidocaine 5 % ointment    XYLOCAINE    350 g    Apply quarter size amount to chest and back up to 3 times daily as needed for pain.    Chest wall pain       lidocaine visc 2% 2.5mL/5mL & maalox/mylanta w/ simeth 2.5mL/5mL & diphenhydrAMINE 5mg/5mL Susp suspension    [facility] Mouth[facility]    360 mL    Swish and spit 10 mLs in mouth every 6 hours as needed for mouth sores    Stomatitis and mucositis, High grade B-cell lymphoma (H)       * lidocaine-prilocaine cream    EMLA    30 g    Apply topically as needed (port access pain.)    Neoplasm related pain, Chest wall pain       * lidocaine-prilocaine cream    EMLA    60 g    Apply topically as needed for moderate pain    High grade B-cell lymphoma (H)       magic mouthwash suspension (diphenhydrAMINE, lidocaine, aluminum-magnesium & simethicone) Susp  compounding kit     237 mL    Swish and spit 5-10 mLs in mouth every 6 hours as needed for mouth sores    Stomatitis and mucositis, High grade B-cell lymphoma (H)       methocarbamol 750 MG tablet    ROBAXIN    360 tablet    Take 1 tablet (750 mg) by mouth 4 times daily . Ok to take a 5th Robaxin on very severe days.    Myofascial pain       montelukast 10 MG tablet    SINGULAIR    120 tablet    Take 2 tablets (20 mg) by mouth 2 times daily    Idiopathic mast cell activation syndrome (H)       morphine 30 MG 12 hr tablet    MS CONTIN    120 tablet    Take 1 tablet (30 mg) by mouth every 8 hours . Take an addition pill at night such that your evening dose is 60mg    Neoplasm related pain       naloxone nasal spray    NARCAN    0.2 mL    Spray 1 spray (4 mg) into one nostril alternating nostrils as needed for opioid reversal every 2-3 minutes until assistance arrives    Encounter for long-term opiate analgesic use       olopatadine 0.1 % ophthalmic solution    PATANOL     Apply 1 drop to eye        * ondansetron 8 MG ODT tab    ZOFRAN-ODT    30 tablet    DISSOLVE ONE TABLET IN MOUTH EVERY EIGHT HOURS AS NEEDED    High grade B-cell lymphoma (H), Nausea and vomiting, intractability of vomiting not specified, unspecified vomiting type, Breast cancer (H)       * ondansetron 8 MG ODT tab    ZOFRAN-ODT    30 tablet    Take 1 tablet (8 mg) by mouth every 8 hours as needed    High grade B-cell lymphoma (H), Nausea and vomiting, intractability of vomiting not specified, unspecified vomiting type       * order for DME     1 Units    Compression stockings, medium grade, please measure and fit. Please dispense a pair.    Peripheral edema       * order for DME     2 Units    Equipment being ordered: compression stockings. 20-30 mm or 30 - 40 mm as patient can tolerate. Physical therapy to determine if they should be above or below the knee.    Venous stasis       * order for DME     2 Device    Equipment being ordered: compression  bra    Malignant neoplasm of right female breast (H)       order for DME     1 Units    Compression socks - knee high 20-30 mmHg zippered if possible    Lower extremity edema       oxyCODONE IR 30 MG tablet    ROXICODONE    146 tablet    Take 1 tablet (30 mg) by mouth every 4 hours as needed for moderate to severe pain . This is a 30day supply    Other chronic postprocedural pain, Encounter for long-term opiate analgesic use       polyethylene glycol powder    MIRALAX    510 g    Take 17 g (1 capful) by mouth daily    Acute constipation       potassium chloride SA 20 MEQ CR tablet    KLOR-CON    60 tablet    Take 1 tablet (20 mEq) by mouth 2 times daily    Hypokalemia       PROBIOTIC DAILY PO      Take 1 capsule by mouth daily Lacto acid bifidobacterium    Breast cancer, unspecified laterality, Thyroid cancer (H), Chronic arthralgias of knees and hips, unspecified laterality       prochlorperazine 10 MG tablet    COMPAZINE     Take 10 mg by mouth as needed        ranitidine 75 MG tablet    ZANTAC    90 tablet    Take 1 tablet (75 mg) by mouth 3 times daily    Mast cell disease, systemic       senna-docusate 8.6-50 MG per tablet    SENOKOT-S;PERICOLACE    60 tablet    Take 1-2 tablets by mouth 2 times daily as needed for constipation    Acute constipation       SUMAtriptan 50 MG tablet    IMITREX    30 tablet    Take 1 tablet (50 mg) by mouth as needed    Intractable persistent migraine aura without cerebral infarction and without status migrainosus       tamoxifen 20 MG tablet    NOLVADEX    90 tablet    Take 1 tablet (20 mg) by mouth daily    Ductal carcinoma in situ (DCIS) of breast, unspecified laterality       triamcinolone 0.025 % ointment    KENALOG     Apply topically as needed        UNABLE TO FIND      3 tablets 3 times daily MEDICATION NAME: calcium D-Glucarate  3 caps contain 180mg of elemental calcium.        UNABLE TO FIND      Take 2 capsules by mouth 3 times daily Muscle Mag. 2 caps contain B1 20mg,  B2 20mg, B6 10mg, magesium 20mg, manganese 2mg.        UNABLE TO FIND      2 tablets 3 times daily MEDICATION NAME: Digestzymes    Thyroid cancer (H), Postsurgical hypothyroidism, Postsurgical hypoparathyroidism (H)       UNABLE TO FIND      1 tablet daily MEDICATION NAME: Pure Encapsulations    Thyroid cancer (H), Postsurgical hypothyroidism, Postsurgical hypoparathyroidism (H)       VENTOLIN  (90 Base) MCG/ACT inhaler   Generic drug:  albuterol     18 g    INHALE 2 PUFFS INTO THE LUNGS EVERY 4 HOURS AS NEEDED FOR SHORTNESS OF BREATH OR DIFFICULT BREATHING OR WHEEZING    Mild intermittent asthma without complication       vitamin D3 2000 units Caps     60 capsule    Take 5,000 Units by mouth daily Takes 2 tabs daily    Thyroid cancer (H), Postsurgical hypothyroidism, Papillary carcinoma, follicular variant (H), Postsurgical hypoparathyroidism (H)       * Notice:  This list has 9 medication(s) that are the same as other medications prescribed for you. Read the directions carefully, and ask your doctor or other care provider to review them with you.

## 2018-10-11 NOTE — PROGRESS NOTES
SUBJECTIVE:   Tisha Arias is a 58 year old female who presents to clinic today for the following health issues:      Chief Complaint   Patient presents with     Patient Request     Discussion about side effects from chemo ; Feet concerns that radiate up to ankle      Foot/leg pain/paresthesia and edema around the time she got chemo.  Started in dec/january, then progressed quickly in feb/march, august to now has worsened as well.  Last chemo treatment was the 30th of January.  She has exhausted most options for peripheral neuropathy treatment.    Problem list and histories reviewed & adjusted, as indicated.  Additional history: as documented    BP Readings from Last 3 Encounters:   09/04/18 113/61   08/29/18 139/81   06/08/18 (!) 141/92    Wt Readings from Last 3 Encounters:   09/04/18 188 lb (85.3 kg)   08/29/18 188 lb 11.2 oz (85.6 kg)   06/08/18 181 lb 7 oz (82.3 kg)        Reviewed and updated as needed this visit by clinical staff  Allergies  Meds  Problems       Reviewed and updated as needed this visit by Provider  Allergies  Meds  Problems         ROS:  Constitutional, HEENT, cardiovascular, pulmonary, gi and gu systems are negative, except as otherwise noted.    OBJECTIVE:     There were no vitals taken for this visit.  There is no height or weight on file to calculate BMI.  GENERAL: healthy, alert and no distress  EYES: Eyes grossly normal to inspection, PERRL and conjunctivae and sclerae normal  NECK: no adenopathy, no asymmetry, masses, or scars and thyroid normal to palpation  RESP: lungs clear to auscultation - no rales, rhonchi or wheezes  CV: regular rate and rhythm, normal S1 S2, no S3 or S4, no murmur, click or rub, no peripheral edema and peripheral pulses strong  MS: no gross musculoskeletal defects noted, no edema  SKIN: no suspicious lesions or rashes  NEURO: generalized LE tenderness, Normal strength and tone, mentation intact and speech normal  PSYCH: mentation appears  normal, affect normal/bright    ASSESSMENT/PLAN:     1. Drug-induced polyneuropathy (H)  Will refer to palliative care per patient request  - PALLIATIVE CARE REFERRAL    2. Other chronic postprocedural pain  As above  - PALLIATIVE CARE REFERRAL    3. High grade B-cell lymphoma (H)  As above  - PALLIATIVE CARE REFERRAL    Follow up if symptoms worsen or fail to improve.     Pablo Zarco MD  [facility]

## 2018-10-30 DIAGNOSIS — Z79.891 ENCOUNTER FOR LONG-TERM OPIATE ANALGESIC USE: ICD-10-CM

## 2018-10-30 DIAGNOSIS — G89.28 OTHER CHRONIC POSTPROCEDURAL PAIN: ICD-10-CM

## 2018-10-30 DIAGNOSIS — G89.3 NEOPLASM RELATED PAIN: ICD-10-CM

## 2018-10-30 NOTE — TELEPHONE ENCOUNTER
"Received VM from patient requesting refill of oxycodone and morphine.     Last refills: 10/5/2018  Last office visit: 8/29/2018  Scheduled for follow up 11/28/2018     Will route request to MD for review.     Reviewed [place]  Report.      Per last note: \"At the end of the day I can make her believe that but I am beginning tapering today-reducing her oxycodone by 10% a month.\"    Oxycodone script pended was decreased by 10%.     "

## 2018-10-31 RX ORDER — OXYCODONE HYDROCHLORIDE 30 MG/1
30 TABLET ORAL EVERY 4 HOURS PRN
Qty: 132 TABLET | Refills: 0 | Status: SHIPPED | OUTPATIENT
Start: 2018-11-02 | End: 2018-11-28

## 2018-10-31 RX ORDER — MORPHINE SULFATE 30 MG/1
30 TABLET, FILM COATED, EXTENDED RELEASE ORAL EVERY 8 HOURS
Qty: 120 TABLET | Refills: 0 | Status: SHIPPED | OUTPATIENT
Start: 2018-11-02 | End: 2018-11-28

## 2018-11-13 DIAGNOSIS — G89.3 NEOPLASM RELATED PAIN: ICD-10-CM

## 2018-11-13 NOTE — TELEPHONE ENCOUNTER
Received VM from patient. She would like to go back on the compound cream she used previously as the pain is too bad right now. She notes that Dr. Sauceda re-prescribed gabapentin for her, but she is scared to go back on it. She would like enough compound cream to go on her chest and feet.     Will route request to MD for review.

## 2018-11-14 ENCOUNTER — TELEPHONE (OUTPATIENT)
Dept: FAMILY MEDICINE | Facility: CLINIC | Age: 58
End: 2018-11-14

## 2018-11-14 NOTE — TELEPHONE ENCOUNTER
Patient is due for routine mammogram, please contact patient to schedule. Sent patient a MyChart message  Dionne Rich,

## 2018-11-20 DIAGNOSIS — M79.18 MYOFASCIAL PAIN: ICD-10-CM

## 2018-11-20 NOTE — TELEPHONE ENCOUNTER
Received request from pharmacy for refill of methocarbamol.     Last refill: 5/25/2018  Last office visit: 8/29/2018  Scheduled for follow up 11/28/2018     Will route request to MD for review.     Reviewed [place]  Report.

## 2018-11-21 RX ORDER — METHOCARBAMOL 750 MG/1
TABLET, FILM COATED ORAL
Qty: 120 TABLET | Refills: 3 | Status: SHIPPED | OUTPATIENT
Start: 2018-11-21 | End: 2019-01-15

## 2018-11-26 ENCOUNTER — RECORDS - HEALTHEAST (OUTPATIENT)
Dept: ADMINISTRATIVE | Facility: OTHER | Age: 58
End: 2018-11-26

## 2018-11-26 ENCOUNTER — OFFICE VISIT (OUTPATIENT)
Dept: ENDOCRINOLOGY | Facility: CLINIC | Age: 58
End: 2018-11-26
Payer: COMMERCIAL

## 2018-11-26 ENCOUNTER — TELEPHONE (OUTPATIENT)
Dept: ONCOLOGY | Facility: CLINIC | Age: 58
End: 2018-11-26

## 2018-11-26 ENCOUNTER — ONCOLOGY VISIT (OUTPATIENT)
Dept: ONCOLOGY | Facility: CLINIC | Age: 58
End: 2018-11-26
Attending: INTERNAL MEDICINE
Payer: COMMERCIAL

## 2018-11-26 VITALS
TEMPERATURE: 98.6 F | HEART RATE: 120 BPM | BODY MASS INDEX: 29.51 KG/M2 | SYSTOLIC BLOOD PRESSURE: 129 MMHG | WEIGHT: 177.1 LBS | OXYGEN SATURATION: 96 % | DIASTOLIC BLOOD PRESSURE: 81 MMHG | RESPIRATION RATE: 16 BRPM | HEIGHT: 65 IN

## 2018-11-26 VITALS
BODY MASS INDEX: 29.49 KG/M2 | WEIGHT: 177.03 LBS | SYSTOLIC BLOOD PRESSURE: 129 MMHG | HEIGHT: 65 IN | HEART RATE: 120 BPM | DIASTOLIC BLOOD PRESSURE: 81 MMHG

## 2018-11-26 DIAGNOSIS — C73 THYROID CANCER (H): ICD-10-CM

## 2018-11-26 DIAGNOSIS — D05.10 DUCTAL CARCINOMA IN SITU (DCIS) OF BREAST, UNSPECIFIED LATERALITY: ICD-10-CM

## 2018-11-26 DIAGNOSIS — C73 PAPILLARY CARCINOMA, FOLLICULAR VARIANT (H): Primary | ICD-10-CM

## 2018-11-26 DIAGNOSIS — E89.0 POSTSURGICAL HYPOTHYROIDISM: ICD-10-CM

## 2018-11-26 DIAGNOSIS — Z85.3 PERSONAL HISTORY OF MALIGNANT NEOPLASM OF BREAST: ICD-10-CM

## 2018-11-26 DIAGNOSIS — R60.0 LOCALIZED EDEMA: ICD-10-CM

## 2018-11-26 DIAGNOSIS — C50.919 BREAST CANCER (H): ICD-10-CM

## 2018-11-26 DIAGNOSIS — E89.2 POSTSURGICAL HYPOPARATHYROIDISM (H): ICD-10-CM

## 2018-11-26 DIAGNOSIS — C85.10 HIGH GRADE B-CELL LYMPHOMA (H): Primary | ICD-10-CM

## 2018-11-26 DIAGNOSIS — E83.51 HYPOCALCEMIA: ICD-10-CM

## 2018-11-26 DIAGNOSIS — N20.0 KIDNEY STONE: ICD-10-CM

## 2018-11-26 DIAGNOSIS — R07.89 CHEST WALL PAIN: ICD-10-CM

## 2018-11-26 PROCEDURE — 99214 OFFICE O/P EST MOD 30 MIN: CPT | Mod: ZP | Performed by: INTERNAL MEDICINE

## 2018-11-26 PROCEDURE — G0463 HOSPITAL OUTPT CLINIC VISIT: HCPCS | Mod: ZF

## 2018-11-26 RX ORDER — FUROSEMIDE 20 MG
20 TABLET ORAL 2 TIMES DAILY
Qty: 90 TABLET | Refills: 3 | Status: SHIPPED | OUTPATIENT
Start: 2018-11-26 | End: 2019-03-20

## 2018-11-26 RX ORDER — TAMOXIFEN CITRATE 20 MG/1
20 TABLET ORAL DAILY
Qty: 90 TABLET | Refills: 3 | Status: SHIPPED | OUTPATIENT
Start: 2018-11-26 | End: 2019-05-14

## 2018-11-26 RX ORDER — LIDOCAINE 50 MG/G
OINTMENT TOPICAL
Qty: 350 G | Refills: 1 | Status: SHIPPED | OUTPATIENT
Start: 2018-11-26 | End: 2019-10-18

## 2018-11-26 RX ORDER — ACETAMINOPHEN 160 MG
5000 TABLET,DISINTEGRATING ORAL DAILY
Refills: 0 | COMMUNITY
Start: 2018-11-26 | End: 2019-03-20

## 2018-11-26 ASSESSMENT — PAIN SCALES - GENERAL
PAINLEVEL: EXTREME PAIN (9)
PAINLEVEL: EXTREME PAIN (9)

## 2018-11-26 NOTE — MR AVS SNAPSHOT
After Visit Summary   11/26/2018    Tisha Arias    MRN: [id number]           Patient Information     Date Of Birth          1960        Visit Information        Provider Department      11/26/2018 4:40 PM Avelina Castro MD [facility]        Today's Diagnoses     Papillary carcinoma, follicular variant (H)    -  1    Postsurgical subclinical hypoparathyroidism (H)        Postsurgical hypothyroidism        Hypocalcemia        Kidney stone        Thyroid cancer (H)        Postsurgical hypoparathyroidism          Care Instructions    Neck ultrasound at your earliest convenience    24 hour urine calcium    Labs every 6 months - you have standing orders for this    See me yearly - get neck ultrasound just before appt in a year       Thank you for choosing [facility] for your health care needs. Below is some information for patients who are interested in having their follow-up visit with a physician by telephone. In some cases, a telephone visit can be an effective and convenient way to manage your follow-up care. Choosing a telephone visit rather than a face to face visit for your follow-up care is a decision that you and your physician can make together to ensure it meets all of your needs.  A face to face visit is always an available option, if you choose to do so.     We want to make sure you have all of the information you need about the telephone visit option and answer all of your questions before you decide to schedule a telephone follow-up visit. If you have any questions, you may talk to a staff member or our financial counselor at [phone].    1.  General overview  Our clinic sees patients for a variety of conditions and concerns. A face to face visit with your doctor is required for any new concerns or for your initial visit. If you and your doctor decide that a follow up visit by telephone is appropriate, you may decide to opt for a  telephone visit.     2.  Billing and insurance coverage  There is a charge for telephone visits, similar to the charge for an in-person visit. Your bill is based on the amount of time you and your physician are on the phone. We will bill each visit to your insurance company (just like your other medical visits), and you will be responsible for any costs not paid by your insurance company. The charge may be denied by your insurance company, in which case you will be responsible for the entire amount billed. The decision to cover the cost is determined by your insurance company. If you want to know what your insurance company will cover, we encourage you to contact them to determine your coverage. The codes below are the codes we use when billing for telephone visits and the associated charges. This may help you work with your insurance company to determine your benefits.   Billing CPT codes for Telephone visits    41464 ($30), 04725 ($35), 77214 ($40)     3. Updating your consent form  You may get contacted by a staff member about updating your consent form. If it needs updating prior to your telephone visit, please visit the link below, print it and fill it out and return it to us at [facility], Mail Code [id number], [address] [place] [id number].   [email or web address]                  Follow-ups after your visit        Follow-up notes from your care team     Return in about 1 year (around 11/26/2019).      Your next 10 appointments already scheduled     Nov 27, 2018 12:00 PM CST   US HEAD NECK SOFT TISSUE with UCUS3   [facility] ([facility])    [address] [place] [address] [phone]           How do I prepare for my exam? (Food and drink instructions) No Food and Drink Restrictions.  How do I prepare for my exam? (Other instructions) You do not need to do anything special to prepare for your exam.   What should I wear: Wear comfortable clothes.  How long does the exam take: Most ultrasounds take 30 to 60 minutes.  What should I bring: Bring a list of your medicines, including vitamins, minerals and over-the-counter drugs. It is safest to leave personal items at home.  Do I need a :  No  is needed.  What do I need to tell my doctor: Tell your doctor about any allergies you may have.  What should I do after the exam: No restrictions, You may resume normal activities.  What is this test: An ultrasound uses sound waves to make pictures of the body. Sound waves do not cause pain. The only discomfort may be the pressure of the wand against your skin or full bladder.  Who should I call with questions: If you have any questions, please call the Imaging Department where you will have your exam. Directions, parking instructions, and other information is available on our website, [employer]/imaging.            Nov 28, 2018  6:45 AM CST   [facility] Draw with  [facility] DRAW   [facility] Draw ([facility])    [address] [place] [address]  [address] [place] [id number]   [phone]            Nov 28, 2018  7:00 AM CST   (Arrive by 6:45 AM)   Return Visit with Edu Benitez MD   [facility] ([facility])    [address] [place] [address]  [address]  [employer] [address]   [phone]            Dec 06, 2018  7:40 AM CST   (Arrive by 7:25 AM)   New Patient Visit with Farzana Mccray MD   [facility])    [address] [place] [address]  [address]  [employer] [address]   [phone]            May 13, 2019  4:30 PM CDT   (Arrive by 4:15 PM)   Return Visit with Shahla Crawley MD   [facility])    [address] [place] [address]  [address] [place] [address]   [phone]              Future tests that were ordered for you  today     Open Standing Orders        Priority Remaining Interval Expires Ordered    TSH Routine 3/3 5 months 11/26/2019 11/26/2018    T4 free Routine 3/3 5 months 11/26/2019 11/26/2018    Thyroglobulin (Total, antibody & recovery %) Routine 3/3 5 months 11/26/2019 11/26/2018    Calcium Routine 3/3 5 months 11/26/2019 11/26/2018    Phosphorus Routine 3/3 5 months 11/26/2019 11/26/2018    Creatinine Routine 3/3 5 months 11/26/2019 11/26/2018    Calcium ionized Routine 3/3 5 months 11/26/2019 11/26/2018          Open Future Orders        Priority Expected Expires Ordered    Comprehensive metabolic panel Routine 11/28/2018 11/26/2019 11/26/2018    Lactate Dehydrogenase Routine 11/28/2018 11/26/2019 11/26/2018    IgM Routine 11/28/2018 11/26/2019 11/26/2018    IgG Routine 11/28/2018 11/26/2019 11/26/2018    IgA Routine 11/28/2018 11/26/2019 11/26/2018    *CBC with platelets differential Routine 11/28/2018 11/26/2019 11/26/2018    Uric acid Routine 11/28/2018 11/26/2019 11/26/2018    Calcium timed urine with Creat Ratio Routine 11/27/2018 5/25/2019 11/26/2018    Creatinine timed urine Routine 11/27/2018 5/25/2019 11/26/2018    US head neck soft tissue Routine 10/8/2019 11/24/2019 11/26/2018    US head neck soft tissue Routine  6/24/2019 11/26/2018            Who to contact     Please call your clinic at [phone] to:    Ask questions about your health    Make or cancel appointments    Discuss your medicines    Learn about your test results    Speak to your doctor            Additional Information About Your Visit        [employer] Information     [employer] gives you secure access to your electronic health record. If you see a primary care provider, you can also send messages to your care team and make appointments. If you have questions, please call your primary care clinic.  If you do not have a primary care provider, please call [phone] and they will assist you.      [employer] is an electronic gateway that provides  "easy, online access to your medical records. With [employer], you can request a clinic appointment, read your test results, renew a prescription or communicate with your care team.     To access your existing account, please contact your [facility] or call [phone] for assistance.        Care EveryWhere ID     This is your Care EveryWhere ID. This could be used by other organizations to access your [place] medical records  [id number]        Your Vitals Were     Pulse Height BMI (Body Mass Index)             120 1.651 m (5' 5\") 29.46 kg/m2          Blood Pressure from Last 3 Encounters:   11/26/18 129/81   11/26/18 129/81   09/04/18 113/61    Weight from Last 3 Encounters:   11/26/18 80.3 kg (177 lb 0.5 oz)   11/26/18 80.3 kg (177 lb 1.6 oz)   09/04/18 85.3 kg (188 lb)                 Today's Medication Changes          These changes are accurate as of 11/26/18  5:12 PM.  If you have any questions, ask your nurse or doctor.               These medicines have changed or have updated prescriptions.        Dose/Directions    calcium Citrate-vitamin D 500-400 MG-UNIT Chew   This may have changed:    - how much to take  - when to take this   Changed by:  Avelina Castro MD        Dose:  2 tablet   Take 2 tablets by mouth 2 times daily   Quantity:  60 tablet   Refills:  0       cyclobenzaprine 10 MG tablet   Commonly known as:  FLEXERIL   This may have changed:  when to take this   Used for:  High grade B-cell lymphoma (H)        Dose:  10 mg   Take 1 tablet (10 mg) by mouth 3 times daily as needed   Quantity:  30 tablet   Refills:  0       vitamin D3 2000 units Caps   This may have changed:  additional instructions   Used for:  Thyroid cancer (H), Postsurgical hypothyroidism, Papillary carcinoma, follicular variant (H), Postsurgical hypoparathyroidism (H)   Changed by:  Avelina Castro MD        Dose:  5000 Units   Take 5,000 Units by mouth daily   Refills:  0            Where to " get your medicines      These medications were sent to [id number] - [place], [place] - [address]  [address] [facility]. [place] [address]     Phone:  [phone]     furosemide 20 MG tablet    lidocaine 5 % ointment    tamoxifen 20 MG tablet                Primary Care Provider Office Phone # Fax #    Pablo Soto [phone] [address] [facility] [address]  [place] [address]        Equal Access to Services     [facility]: Hadii aad ku hadasho Soomaali, waaxda luqadaha, qaybta kaalmada adeegyada, waxay idiin hayaan adeeg kharash la'rajiv . So [facility] [phone].    ATENCIÓN: Si habla español, tiene a stiles disposición servicios gratuitos de asistencia lingüística. [facility] [phone].    We comply with applicable federal civil rights laws and [place] laws. We do not discriminate on the basis of race, color, national origin, age, disability, sex, sexual orientation, or gender identity.            Thank you!     Thank you for choosing [facility]  for your care. Our goal is always to provide you with excellent care. Hearing back from our patients is one way we can continue to improve our services. Please take a few minutes to complete the written survey that you may receive in the mail after your visit with us. Thank you!             Your Updated Medication List - Protect others around you: Learn how to safely use, store and throw away your medicines at www.disposemymeds.org.          This list is accurate as of 11/26/18  5:12 PM.  Always use your most recent med list.                   Brand Name Dispense Instructions for use Diagnosis    ACAI RAMIREZ PO      Take 50 mg by mouth        acetaminophen 325 MG tablet    TYLENOL    100 tablet    Take 2 tablets (650 mg) by mouth every 4 hours as needed for mild pain or fever    High grade B-cell lymphoma (H)       acyclovir 400 MG tablet    ZOVIRAX    30 tablet    Take 1 tablet (400 mg) by mouth 2 times daily     High grade B-cell lymphoma (H)       aluminum chloride 20 % external solution    DRYSOL    60 mL    Apply topically At Bedtime    Rash, Intertrigo       azithromycin 250 MG tablet    ZITHROMAX    6 tablet    Two tablets first day, then one tablet daily for four days.    Cat scratch of forearm, unspecified laterality, initial encounter       AZMACORT IN      Inhale 2 puffs into the lungs as needed        bisacodyl 5 MG EC tablet     30 tablet    Take 3 tablets (15 mg) by mouth daily as needed for constipation    Constipation, unspecified constipation type       calcitRIOL 0.25 MCG capsule    ROCALTROL    90 capsule    TAKE 2 CAPSULES BY MOUTH EVERY MORNING AND TAKE 1 CAPSULE BY MOUTH EVERY NIGHT AT BEDTIME    Postsurgical hypothyroidism, Papillary carcinoma, follicular variant (H), Metastasis to cervical lymph node (H)       calcium Citrate-vitamin D 500-400 MG-UNIT Chew     60 tablet    Take 2 tablets by mouth 2 times daily        celecoxib 200 MG capsule    celeBREX    60 capsule    Take 1 capsule (200 mg) by mouth 2 times daily    Chest wall pain       cetirizine 10 MG tablet    ZYRTEC ALLERGY    90 tablet    Take 1 tablet (10 mg) by mouth 3 times daily On hold for lab test.    High grade B-cell lymphoma (H)       COMPOUND CONTAINING CONTROLLED SUBSTANCE - PHARMACY TO MIX COMPOUNDED MEDICATION    CMPD RX    120 g    Ketamine 6% + lidocaine 10% in Lipo. Apply small amount to affected area two times daily.    Neoplasm related pain       cromolyn 4 % ophthalmic solution    OPTICROM    10 mL    Place 1 drop into both eyes 4 times daily    Idiopathic mast cell activation syndrome (H)       cromolyn sodium 5.2 MG/ACT nasal aerosol    NASALCROM    1 Bottle    SPRAY ONE SPRAY( 1 ML) IN NOSTRIL DAILY    Mast cell disease, systemic       [employer] GUMMY [employer] CHILDREN Chew     120 tablet    Take 5 g by mouth daily (2 gummy= 5 g =1 serving)    High grade B-cell lymphoma (H)       cyclobenzaprine 10 MG tablet    FLEXERIL     30 tablet    Take 1 tablet (10 mg) by mouth 3 times daily as needed    High grade B-cell lymphoma (H)       diazepam 5 MG tablet    VALIUM    90 tablet    TAKE ONE TABLET (5 MG) BY MOUTH THREE TIMES DAILY AS NEEDED FOR MUSCLE SPASMS    Chest wall pain       diclofenac 1 % topical gel    VOLTAREN    100 g    Apply affected area two times daily PRN using enclosed dosing card.    Myofascial pain       EPINEPHrine 0.3 MG/0.3ML injection 2-pack    EPIPEN 2-CARIDAD    0.6 mL    Inject 0.3 mLs (0.3 mg) into the muscle once as needed for anaphylaxis    Anaphylaxis, subsequent encounter       fluticasone 50 MCG/ACT nasal spray    FLONASE    1 Bottle    Spray 1-2 sprays into both nostrils daily    Bacterial sinusitis       furosemide 20 MG tablet    LASIX    90 tablet    Take 1 tablet (20 mg) by mouth 2 times daily    Localized edema, Chest wall pain, Ductal carcinoma in situ (DCIS) of breast, unspecified laterality, Papillary carcinoma, follicular variant (H), Personal history of malignant neoplasm of breast       levofloxacin 750 MG tablet    LEVAQUIN    10 tablet    Take 1 tablet (750 mg) by mouth daily    Bacterial sinusitis       levothyroxine 112 MCG tablet    SYNTHROID/LEVOTHROID    189 tablet    ON MONDAY THRU SATURDAY TAKE TWO TABLETS DAILY AND ON SUNDAY TAKE 3 TABLETS DAILY.    Postsurgical hypothyroidism, Papillary carcinoma, follicular variant (H), Postsurgical hypoparathyroidism (H), Thyroid cancer (H)       lidocaine 5 % ointment    XYLOCAINE    350 g    Apply quarter size amount to chest and back up to 3 times daily as needed for pain.    Chest wall pain, Localized edema, Ductal carcinoma in situ (DCIS) of breast, unspecified laterality, Papillary carcinoma, follicular variant (H), Personal history of malignant neoplasm of breast       lidocaine visc 2% 2.5mL/5mL & maalox/mylanta w/ simeth 2.5mL/5mL & diphenhydrAMINE 5mg/5mL Susp suspension    [facility]    360 mL    Swish and spit 10 mLs in mouth  every 6 hours as needed for mouth sores    Stomatitis and mucositis, High grade B-cell lymphoma (H)       * lidocaine-prilocaine cream    EMLA    30 g    Apply topically as needed (port access pain.)    Neoplasm related pain, Chest wall pain       * lidocaine-prilocaine cream    EMLA    60 g    Apply topically as needed for moderate pain    High grade B-cell lymphoma (H)       magic mouthwash suspension (diphenhydrAMINE, lidocaine, aluminum-magnesium & simethicone) Susp compounding kit     237 mL    Swish and spit 5-10 mLs in mouth every 6 hours as needed for mouth sores    Stomatitis and mucositis, High grade B-cell lymphoma (H)       methocarbamol 750 MG tablet    ROBAXIN    120 tablet    Take 1 tablet (750 mg) by mouth 4 times daily . Ok to take a 5th Robaxin on very severe days.    Myofascial pain       montelukast 10 MG tablet    SINGULAIR    120 tablet    Take 2 tablets (20 mg) by mouth 2 times daily    Idiopathic mast cell activation syndrome (H)       morphine 30 MG CR tablet    MS CONTIN    120 tablet    Take 1 tablet (30 mg) by mouth every 8 hours . Take an addition pill at night such that your evening dose is 60mg    Neoplasm related pain       naloxone 4 MG/0.1ML nasal spray    NARCAN    0.2 mL    Spray 1 spray (4 mg) into one nostril alternating nostrils as needed for opioid reversal every 2-3 minutes until assistance arrives    Encounter for long-term opiate analgesic use       olopatadine 0.1 % ophthalmic solution    PATANOL     Apply 1 drop to eye        * ondansetron 8 MG ODT tab    ZOFRAN-ODT    30 tablet    DISSOLVE ONE TABLET IN MOUTH EVERY EIGHT HOURS AS NEEDED    High grade B-cell lymphoma (H), Nausea and vomiting, intractability of vomiting not specified, unspecified vomiting type, Breast cancer (H)       * ondansetron 8 MG ODT tab    ZOFRAN-ODT    30 tablet    Take 1 tablet (8 mg) by mouth every 8 hours as needed    High grade B-cell lymphoma (H), Nausea and vomiting, intractability of  vomiting not specified, unspecified vomiting type       * order for DME     1 Units    Compression stockings, medium grade, please measure and fit. Please dispense a pair.    Peripheral edema       * order for DME     2 Units    Equipment being ordered: compression stockings. 20-30 mm or 30 - 40 mm as patient can tolerate. Physical therapy to determine if they should be above or below the knee.    Venous stasis       * order for DME     2 Device    Equipment being ordered: compression bra    Malignant neoplasm of right female breast (H)       order for DME     1 Units    Compression socks - knee high 20-30 mmHg zippered if possible    Lower extremity edema       oxyCODONE IR 30 MG tablet    ROXICODONE    132 tablet    Take 1 tablet (30 mg) by mouth every 4 hours as needed for moderate to severe pain . This is a 30day supply    Other chronic postprocedural pain, Encounter for long-term opiate analgesic use       polyethylene glycol powder    MIRALAX    510 g    Take 17 g (1 capful) by mouth daily    Acute constipation       potassium chloride SA 20 MEQ CR tablet    KLOR-CON    60 tablet    Take 1 tablet (20 mEq) by mouth 2 times daily    Hypokalemia       PROBIOTIC DAILY PO      Take 1 capsule by mouth daily Lacto acid bifidobacterium    Breast cancer, unspecified laterality, Thyroid cancer (H), Chronic arthralgias of knees and hips, unspecified laterality       prochlorperazine 10 MG tablet    COMPAZINE     Take 10 mg by mouth as needed        ranitidine 75 MG tablet    ZANTAC    90 tablet    Take 1 tablet (75 mg) by mouth 3 times daily    Mast cell disease, systemic       senna-docusate 8.6-50 MG per tablet    SENOKOT-S;PERICOLACE    60 tablet    Take 1-2 tablets by mouth 2 times daily as needed for constipation    Acute constipation       SUMAtriptan 50 MG tablet    IMITREX    30 tablet    Take 1 tablet (50 mg) by mouth as needed    Intractable persistent migraine aura without cerebral infarction and without  status migrainosus       tamoxifen 20 MG tablet    NOLVADEX    90 tablet    Take 1 tablet (20 mg) by mouth daily    Ductal carcinoma in situ (DCIS) of breast, unspecified laterality, Localized edema, Chest wall pain, Papillary carcinoma, follicular variant (H), Personal history of malignant neoplasm of breast       triamcinolone 0.025 % ointment    KENALOG     Apply topically as needed        UNABLE TO FIND      3 tablets 3 times daily MEDICATION NAME: calcium D-Glucarate  3 caps contain 180mg of elemental calcium.        UNABLE TO FIND      Take 2 capsules by mouth 3 times daily Muscle Mag. 2 caps contain B1 20mg, B2 20mg, B6 10mg, magesium 20mg, manganese 2mg.        UNABLE TO FIND      2 tablets 3 times daily MEDICATION NAME: Digestzymes    Thyroid cancer (H), Postsurgical hypothyroidism, Postsurgical hypoparathyroidism (H)       UNABLE TO FIND      1 tablet daily MEDICATION NAME: Pure Encapsulations    Thyroid cancer (H), Postsurgical hypothyroidism, Postsurgical hypoparathyroidism (H)       VENTOLIN  (90 Base) MCG/ACT inhaler   Generic drug:  albuterol     18 g    INHALE 2 PUFFS INTO THE LUNGS EVERY 4 HOURS AS NEEDED FOR SHORTNESS OF BREATH OR DIFFICULT BREATHING OR WHEEZING    Mild intermittent asthma without complication       vitamin D3 2000 units Caps      Take 5,000 Units by mouth daily    Thyroid cancer (H), Postsurgical hypothyroidism, Papillary carcinoma, follicular variant (H), Postsurgical hypoparathyroidism (H)       * Notice:  This list has 7 medication(s) that are the same as other medications prescribed for you. Read the directions carefully, and ask your doctor or other care provider to review them with you.

## 2018-11-26 NOTE — LETTER
11/26/2018       RE: Tisha Arias  [address] [place] [address]     Dear Colleague,    Thank you for referring your patient, Tisha Arias, to the [facility]. Please see a copy of my visit note below.    November 26, 2018    REASON FOR VISIT:  F/u breast cancer and thyroid cancer     HISTORY OF PRESENT ILLNESS:  Tisha Arias is a 58 year-old woman, postmenopausal, with a history of T1c N0 M0, infiltrating ductal carcinoma of the right breast with a strong family history of breast cancer as well as thyroid cancer and a history of DLBCL. BRCA1 and 2 gene testing negative.    Cancer Treatment History for her breast cancer and DLBCL:  -She was diagnosed with breast cancer in 12/2011.    -She underwent bilateral mastectomy with immediate reconstruction on 01/30/2012 by Dr. Daugherty.  Her final pathology revealed 1.6-cm, infiltrating ductal carcinoma, grade 1/3, no angiolymphatic space invasion, no nipple or skin invasion.  There was associated DCIS, and the margins were free of tumor superiorly, anterior margin by 0.1 cm, and widely free at remaining margins.  This was ER/AZ-positive, HER2-negative in the vast majority of cells and non-amplified with a ratio of 1.1.  She had an Oncotype test performed, which revealed a low-risk score of 11.  That put her overall risk of recurrence at about 7% after 5 years of tamoxifen.    - in 02/2012, she was started on Arimidex.    - 12/2012, she had prophylactic hysterectomy and oophorectomy, the pathology of which was benign.    - Her course has been complicated by extreme chest wall pain, myofascial pain, and neuropathic pain.  She has gone through several different clinics and was put on several different medications, which were not doing her any good.  Eventually she was weaned off all the medications and is currently doing only PT with myofascial pain maneuvers with symptomatic improvement.   - 2/2014 switched from arimidex to  "tamoxifen  - presented to the ER on 9/1/17 for chest pain. CT-PA was negative for PE but showed right 3cm paraspinal mass and subcarinal adenopathy. PET on 9/6/17 showed the paraspinal mass to be hypermetabolic along with hilar and mediastinal nodes.   - biopsy of T10 vertebral body on 9/15/17 showed high-grade B cell lymphoma   - FNA EUS of subcarinal LN on 9/14 showed mostly necrotic tissue with suspicion for malignancy   - pt received DA-EPOCH under the care of Dr Garima Sauceda    Thyroid cancer history: Incidentally, given that she was having so many symptoms, she underwent a PET scan in 4/2013, which revealed a thyroid nodule which was biopsied and was consistent with papillary carcinoma.  She has undergone resection as well as radioiodine treatment since and has done relatively well from the surgery.  She follows up with Dr. Castro for the same.  She unfortunately required etoh ablation therapy over the thyroid lymph nodes.   She remains under the care of Dr Avelina Castro.       INTERVAL HISTORY: In returning today, Elvin comes in today for follow up.      She is complaining of severe chest pain.  She is using lidocaine cream topically.  She is taking oxycodone 30 mg every four hours, and MS Contin 30 mg q 12 hours.  She is also taking celebrex.    Since reducing the dose of oxycodone, she is having severe pain.  She has been written up twice at work.  She feels like her pain is too great to interact with her family.  She is very frustrated with her pain control    She is getting multiple geovanny horses per day in calves and feet.      She feels like her neuropathy in her feet has gotten worse.  Her feet feel like \"they are in a bunch of sand.\"  They will not bend or work the way she wants to move them.    She feels like she can no longer work full time.  She is inquiring about fullterm disability.      Her 10-point review of systems is otherwise negative.     MEDICATIONS:  reviewed     PHYSICAL " "EXAMINATION:    VITAL SIGNS:/81 (BP Location: Left arm, Patient Position: Chair, Cuff Size: Adult Regular)  Pulse 120  Temp 98.6  F (37  C) (Oral)  Resp 16  Ht 1.651 m (5' 5\")  Wt 80.3 kg (177 lb 1.6 oz)  SpO2 96%  BMI 29.47 kg/m2  Vitals in chart and reviewed  GENERAL:  She is well-appearing, appears fatigued   HEENT:  Exam reveals no icterus or pallor.  Oropharynx is clear with no ulcers or lesions.    NECK:  Supple.  No supraclavicular or cervical lymphadenopathy.  She has a scar from thyroid surgery.     HEART:  Regular rate and rhythm.  S1, S2 clear.  No murmur, gallop or rub.    LUNGS:  Clear to auscultation bilaterally.    ABDOMEN:  Soft, nontender, nondistended, no organomegaly.    BREAST EXAM:    No supraclavicular or axillary adenopathy appreciated.  Chest wall with reproducible tenderness on palpation.  No nodules or masses.    MUSC: tenderness in midspine with associated paraspinal tenderness, no other reproducible tenderness  EXTREMITIES:  She has no lower extremity edema. , ambulating without difficulty  SKIN: no rash.    Labs  Lab Results   Component Value Date    WBC 3.9 09/04/2018     Lab Results   Component Value Date    RBC 4.18 09/04/2018     Lab Results   Component Value Date    HGB 13.6 09/04/2018     Lab Results   Component Value Date    HCT 40.0 09/04/2018     No components found for: MCT  Lab Results   Component Value Date    MCV 96 09/04/2018     Lab Results   Component Value Date    MCH 32.5 09/04/2018     Lab Results   Component Value Date    MCHC 34.0 09/04/2018     Lab Results   Component Value Date    RDW 11.7 09/04/2018     Lab Results   Component Value Date     09/04/2018       ASSESSMENT AND PLAN:      DLBCL: dx Sept 2017 with thoracic paravertebral soft tissue mass and mediastinal nodes. overexpression of c-myc and high mitotic index (double-hit like). Staging LP and BM bx negative. S/p DA-R-EPOCH.  She is continuing to see Dr Garima Sauceda for mgmt of her " lymphoma.  I asked Dr Sauceda to arrange f/u.       Stage I, ER/KY-positive, HER2-negative breast cancer: dx in 2011. Oncotype recurrence score of 11 (7% recurrence risk after 5 years of tamoxifen). S/p bilateral mastectomies and BENJIE/BSO. For endocrine therapy she was on arimidex from 1872-2805, then changed to tamoxifen b/c of arthralgias on Arimidex.  We discussed continuing her tamoxifen.  Refill provided. She will remain on this for 10 years.       Papillary thyroid cancer, it is being followed by Dr. Avelina Castro.  She underwent etoh ablation x3.        Synchronous thyroid cancer and breast cancer.  She is brca 1 and 2 negative.  Additional testing is negative.       Pain control - I discussed with Elvin today that I understand she has significant chest wall pain.  I reviewed with her that as a result of her complex pain control as well as the multiple different medications she has been on in the past that I believe it is best if her pain management is with one provider.  She has currently been working with Dr. Edu Benitez and is on MS Contin and oxycodone.  She is seeing Dr. Benitez later this week.  I will discuss her care with him.      We discussed there is a new pain specialist that she could be seeing if she is inquiring about a new referral.  Today she is very frustrated with Dr. Benitez and feels like the weaning of her oxycodone has been extremely difficult for her.      She is inquiring about full-term disability.  I will discuss this with Dr. Benitez.      From a breast cancer perspective, she has no signs or symptoms of recurrence.  I suggested we see her back in 6 months.        Shahla Crawley MD

## 2018-11-26 NOTE — MR AVS SNAPSHOT
After Visit Summary   11/26/2018    Tisha Arias    MRN: [id number]           Patient Information     Date Of Birth          1960        Visit Information        Provider Department      11/26/2018 3:00 PM Shahla Crawley MD [facility]        Today's Diagnoses     Papillary carcinoma, follicular variant (H)    -  1    Localized edema        Chest wall pain        Ductal carcinoma in situ (DCIS) of breast, unspecified laterality        Personal history of malignant neoplasm of breast           Follow-ups after your visit        Your next 10 appointments already scheduled     Nov 26, 2018  4:40 PM CST   (Arrive by 4:25 PM)   RETURN ENDOCRINE with Avelina Castro MD   [facility] ([facility])    [address]  3rd Floor  [facility] [address]   [phone]            Nov 28, 2018  6:45 AM CST   [facility] with  [facility]   [facility] ([facility])    [address] [place] [address]  [address]  [facility] [address]   [phone]            Nov 28, 2018  7:00 AM CST   (Arrive by 6:45 AM)   Return Visit with Edu Benitez MD   [facility] ([facility])    [address]  [address]  [facility] [address]   [phone]            Dec 06, 2018  7:40 AM CST   (Arrive by 7:25 AM)   New Patient Visit with Farzana Mccray MD   [facility] ([facility])    [address]  [address]  [facility] [address]   [phone]            May 13, 2019  4:30 PM CDT   (Arrive by 4:15 PM)   Return Visit with Shahla Crawley MD   [facility] ([facility])    [address] [place] [address]  [address]  [facility] [address]   [phone]              Future tests that were ordered for you today     Open Standing Orders        Priority Remaining  Interval Expires Ordered    TSH Routine 3/3 5 months 11/26/2019 11/26/2018    T4 free Routine 3/3 5 months 11/26/2019 11/26/2018    Thyroglobulin (Total, antibody & recovery %) Routine 3/3 5 months 11/26/2019 11/26/2018    Calcium Routine 3/3 5 months 11/26/2019 11/26/2018    Phosphorus Routine 3/3 5 months 11/26/2019 11/26/2018    Creatinine Routine 3/3 5 months 11/26/2019 11/26/2018    Calcium ionized Routine 3/3 5 months 11/26/2019 11/26/2018          Open Future Orders        Priority Expected Expires Ordered    Comprehensive metabolic panel Routine 11/28/2018 11/26/2019 11/26/2018    Lactate Dehydrogenase Routine 11/28/2018 11/26/2019 11/26/2018    IgM Routine 11/28/2018 11/26/2019 11/26/2018    IgG Routine 11/28/2018 11/26/2019 11/26/2018    IgA Routine 11/28/2018 11/26/2019 11/26/2018    *CBC with platelets differential Routine 11/28/2018 11/26/2019 11/26/2018    Uric acid Routine 11/28/2018 11/26/2019 11/26/2018    Calcium timed urine with Creat Ratio Routine 11/27/2018 5/25/2019 11/26/2018    Creatinine timed urine Routine 11/27/2018 5/25/2019 11/26/2018    US head neck soft tissue Routine 10/8/2019 11/24/2019 11/26/2018    US head neck soft tissue Routine  6/24/2019 11/26/2018            Who to contact     If you have questions or need follow up information about today's clinic visit or your schedule please contact [facility] directly at [phone].  Normal or non-critical lab and imaging results will be communicated to you by MyChart, letter or phone within 4 business days after the clinic has received the results. If you do not hear from us within 7 days, please contact the clinic through MyChart or phone. If you have a critical or abnormal lab result, we will notify you by phone as soon as possible.  Submit refill requests through [facility] or call your pharmacy and they will forward the refill request to us. Please allow 3 business days for your refill to be completed.           "Additional Information About Your Visit        MyChart Information     [facility] gives you secure access to your electronic health record. If you see a primary care provider, you can also send messages to your care team and make appointments. If you have questions, please call your primary care clinic.  If you do not have a primary care provider, please call [phone] and they will assist you.        Care EveryWhere ID     This is your Care EveryWhere ID. This could be used by other organizations to access your [place] medical records  [id number]        Your Vitals Were     Pulse Temperature Respirations Height Pulse Oximetry BMI (Body Mass Index)    120 98.6  F (37  C) (Oral) 16 1.651 m (5' 5\") 96% 29.47 kg/m2       Blood Pressure from Last 3 Encounters:   11/26/18 129/81   09/04/18 113/61   08/29/18 139/81    Weight from Last 3 Encounters:   11/26/18 80.3 kg (177 lb 1.6 oz)   09/04/18 85.3 kg (188 lb)   08/29/18 85.6 kg (188 lb 11.2 oz)                 Today's Medication Changes          These changes are accurate as of 11/26/18  4:27 PM.  If you have any questions, ask your nurse or doctor.               These medicines have changed or have updated prescriptions.        Dose/Directions    cyclobenzaprine 10 MG tablet   Commonly known as:  FLEXERIL   This may have changed:  when to take this   Used for:  High grade B-cell lymphoma (H)        Dose:  10 mg   Take 1 tablet (10 mg) by mouth 3 times daily as needed   Quantity:  30 tablet   Refills:  0            Where to get your medicines      These medications were sent to [id number] - [place], [place] - [address] [address]. [place]  [address] [address]. [place] [address]     Phone:  [phone]     furosemide 20 MG tablet    lidocaine 5 % ointment    tamoxifen 20 MG tablet                Primary Care Provider Office Phone # Fax #    Pablo Soto [phone] [address] [place] [address]        Equal Access " to Services     RODRIGO ZAMORA : Kevin Venegas, wacastilloda robertooluha, qarehana kamounaranjan brown. So [facility] [phone].    ATENCIÓN: Si gladysla veda, tiene a stiles disposición servicios gratuitos de asistencia lingüística. Llame al [phone].    We comply with applicable federal civil rights laws and [place] laws. We do not discriminate on the basis of race, color, national origin, age, disability, sex, sexual orientation, or gender identity.            Thank you!     Thank you for choosing [facility]  for your care. Our goal is always to provide you with excellent care. Hearing back from our patients is one way we can continue to improve our services. Please take a few minutes to complete the written survey that you may receive in the mail after your visit with us. Thank you!             Your Updated Medication List - Protect others around you: Learn how to safely use, store and throw away your medicines at www.disposemymeds.org.          This list is accurate as of 11/26/18  4:27 PM.  Always use your most recent med list.                   Brand Name Dispense Instructions for use Diagnosis    ACAI RAMIREZ PO      Take 50 mg by mouth        acetaminophen 325 MG tablet    TYLENOL    100 tablet    Take 2 tablets (650 mg) by mouth every 4 hours as needed for mild pain or fever    High grade B-cell lymphoma (H)       acyclovir 400 MG tablet    ZOVIRAX    30 tablet    Take 1 tablet (400 mg) by mouth 2 times daily    High grade B-cell lymphoma (H)       aluminum chloride 20 % external solution    DRYSOL    60 mL    Apply topically At Bedtime    Rash, Intertrigo       azithromycin 250 MG tablet    ZITHROMAX    6 tablet    Two tablets first day, then one tablet daily for four days.    Cat scratch of forearm, unspecified laterality, initial encounter       AZMACORT IN      Inhale 2 puffs into the lungs as needed        bisacodyl 5 MG EC tablet     30  tablet    Take 3 tablets (15 mg) by mouth daily as needed for constipation    Constipation, unspecified constipation type       calcitRIOL 0.25 MCG capsule    ROCALTROL    90 capsule    TAKE 2 CAPSULES BY MOUTH EVERY MORNING AND TAKE 1 CAPSULE BY MOUTH EVERY NIGHT AT BEDTIME    Postsurgical hypothyroidism, Papillary carcinoma, follicular variant (H), Metastasis to cervical lymph node (H)       calcium Citrate-vitamin D 500-400 MG-UNIT Chew     60 tablet    Take 1 tablet by mouth 3 times daily        celecoxib 200 MG capsule    celeBREX    60 capsule    Take 1 capsule (200 mg) by mouth 2 times daily    Chest wall pain       cetirizine 10 MG tablet    ZYRTEC ALLERGY    90 tablet    Take 1 tablet (10 mg) by mouth 3 times daily On hold for lab test.    High grade B-cell lymphoma (H)       COMPOUND CONTAINING CONTROLLED SUBSTANCE - PHARMACY TO MIX COMPOUNDED MEDICATION    CMPD RX    120 g    Ketamine 6% + lidocaine 10% in Lipo. Apply small amount to affected area two times daily.    Neoplasm related pain       cromolyn 4 % ophthalmic solution    OPTICROM    10 mL    Place 1 drop into both eyes 4 times daily    Idiopathic mast cell activation syndrome (H)       cromolyn sodium 5.2 MG/ACT nasal aerosol    NASALCROM    1 Bottle    SPRAY ONE SPRAY( 1 ML) IN NOSTRIL DAILY    Mast cell disease, systemic       [employer]ILDREN Chew     120 tablet    Take 5 g by mouth daily (2 gummy= 5 g =1 serving)    High grade B-cell lymphoma (H)       cyclobenzaprine 10 MG tablet    FLEXERIL    30 tablet    Take 1 tablet (10 mg) by mouth 3 times daily as needed    High grade B-cell lymphoma (H)       diazepam 5 MG tablet    VALIUM    90 tablet    TAKE ONE TABLET (5 MG) BY MOUTH THREE TIMES DAILY AS NEEDED FOR MUSCLE SPASMS    Chest wall pain       diclofenac 1 % topical gel    VOLTAREN    100 g    Apply affected area two times daily PRN using enclosed dosing card.    Myofascial pain       EPINEPHrine 0.3 MG/0.3ML injection  2-pack    EPIPEN 2-CARIDAD    0.6 mL    Inject 0.3 mLs (0.3 mg) into the muscle once as needed for anaphylaxis    Anaphylaxis, subsequent encounter       fluticasone 50 MCG/ACT nasal spray    FLONASE    1 Bottle    Spray 1-2 sprays into both nostrils daily    Bacterial sinusitis       * furosemide 20 MG tablet    LASIX    60 tablet    Take 1 tablet (20 mg) by mouth 2 times daily    Bilateral lower extremity edema       * furosemide 20 MG tablet    LASIX    90 tablet    Take 1 tablet (20 mg) by mouth 2 times daily    Localized edema, Chest wall pain, Ductal carcinoma in situ (DCIS) of breast, unspecified laterality, Papillary carcinoma, follicular variant (H), Personal history of malignant neoplasm of breast       hydrochlorothiazide 12.5 MG capsule    MICROZIDE    30 capsule    Take 1 capsule (12.5 mg) by mouth daily    Hypocalcemia       levofloxacin 750 MG tablet    LEVAQUIN    10 tablet    Take 1 tablet (750 mg) by mouth daily    Bacterial sinusitis       levothyroxine 112 MCG tablet    SYNTHROID/LEVOTHROID    189 tablet    ON MONDAY THRU SATURDAY TAKE TWO TABLETS DAILY AND ON SUNDAY TAKE 3 TABLETS DAILY.    Postsurgical hypothyroidism, Papillary carcinoma, follicular variant (H), Postsurgical hypoparathyroidism (H), Thyroid cancer (H)       lidocaine 5 % ointment    XYLOCAINE    350 g    Apply quarter size amount to chest and back up to 3 times daily as needed for pain.    Chest wall pain, Localized edema, Ductal carcinoma in situ (DCIS) of breast, unspecified laterality, Papillary carcinoma, follicular variant (H), Personal history of malignant neoplasm of breast       lidocaine visc 2% 2.5mL/5mL & maalox/mylanta w/ simeth 2.5mL/5mL & diphenhydrAMINE 5mg/5mL Susp suspension    [facility]    360 mL    Swish and spit 10 mLs in mouth every 6 hours as needed for mouth sores    Stomatitis and mucositis, High grade B-cell lymphoma (H)       * lidocaine-prilocaine cream    EMLA    30 g    Apply topically as  needed (port access pain.)    Neoplasm related pain, Chest wall pain       * lidocaine-prilocaine cream    EMLA    60 g    Apply topically as needed for moderate pain    High grade B-cell lymphoma (H)       magic mouthwash suspension (diphenhydrAMINE, lidocaine, aluminum-magnesium & simethicone) Susp compounding kit     237 mL    Swish and spit 5-10 mLs in mouth every 6 hours as needed for mouth sores    Stomatitis and mucositis, High grade B-cell lymphoma (H)       methocarbamol 750 MG tablet    ROBAXIN    120 tablet    Take 1 tablet (750 mg) by mouth 4 times daily . Ok to take a 5th Robaxin on very severe days.    Myofascial pain       montelukast 10 MG tablet    SINGULAIR    120 tablet    Take 2 tablets (20 mg) by mouth 2 times daily    Idiopathic mast cell activation syndrome (H)       morphine 30 MG CR tablet    MS CONTIN    120 tablet    Take 1 tablet (30 mg) by mouth every 8 hours . Take an addition pill at night such that your evening dose is 60mg    Neoplasm related pain       naloxone 4 MG/0.1ML nasal spray    NARCAN    0.2 mL    Spray 1 spray (4 mg) into one nostril alternating nostrils as needed for opioid reversal every 2-3 minutes until assistance arrives    Encounter for long-term opiate analgesic use       olopatadine 0.1 % ophthalmic solution    PATANOL     Apply 1 drop to eye        * ondansetron 8 MG ODT tab    ZOFRAN-ODT    30 tablet    DISSOLVE ONE TABLET IN MOUTH EVERY EIGHT HOURS AS NEEDED    High grade B-cell lymphoma (H), Nausea and vomiting, intractability of vomiting not specified, unspecified vomiting type, Breast cancer (H)       * ondansetron 8 MG ODT tab    ZOFRAN-ODT    30 tablet    Take 1 tablet (8 mg) by mouth every 8 hours as needed    High grade B-cell lymphoma (H), Nausea and vomiting, intractability of vomiting not specified, unspecified vomiting type       * order for DME     1 Units    Compression stockings, medium grade, please measure and fit. Please dispense a pair.     Peripheral edema       * order for DME     2 Units    Equipment being ordered: compression stockings. 20-30 mm or 30 - 40 mm as patient can tolerate. Physical therapy to determine if they should be above or below the knee.    Venous stasis       * order for DME     2 Device    Equipment being ordered: compression bra    Malignant neoplasm of right female breast (H)       order for DME     1 Units    Compression socks - knee high 20-30 mmHg zippered if possible    Lower extremity edema       oxyCODONE IR 30 MG tablet    ROXICODONE    132 tablet    Take 1 tablet (30 mg) by mouth every 4 hours as needed for moderate to severe pain . This is a 30day supply    Other chronic postprocedural pain, Encounter for long-term opiate analgesic use       polyethylene glycol powder    MIRALAX    510 g    Take 17 g (1 capful) by mouth daily    Acute constipation       potassium chloride SA 20 MEQ CR tablet    KLOR-CON    60 tablet    Take 1 tablet (20 mEq) by mouth 2 times daily    Hypokalemia       PROBIOTIC DAILY PO      Take 1 capsule by mouth daily Lacto acid bifidobacterium    Breast cancer, unspecified laterality, Thyroid cancer (H), Chronic arthralgias of knees and hips, unspecified laterality       prochlorperazine 10 MG tablet    COMPAZINE     Take 10 mg by mouth as needed        ranitidine 75 MG tablet    ZANTAC    90 tablet    Take 1 tablet (75 mg) by mouth 3 times daily    Mast cell disease, systemic       senna-docusate 8.6-50 MG per tablet    SENOKOT-S;PERICOLACE    60 tablet    Take 1-2 tablets by mouth 2 times daily as needed for constipation    Acute constipation       SUMAtriptan 50 MG tablet    IMITREX    30 tablet    Take 1 tablet (50 mg) by mouth as needed    Intractable persistent migraine aura without cerebral infarction and without status migrainosus       tamoxifen 20 MG tablet    NOLVADEX    90 tablet    Take 1 tablet (20 mg) by mouth daily    Ductal carcinoma in situ (DCIS) of breast, unspecified  laterality, Localized edema, Chest wall pain, Papillary carcinoma, follicular variant (H), Personal history of malignant neoplasm of breast       triamcinolone 0.025 % ointment    KENALOG     Apply topically as needed        UNABLE TO FIND      3 tablets 3 times daily MEDICATION NAME: calcium D-Glucarate  3 caps contain 180mg of elemental calcium.        UNABLE TO FIND      Take 2 capsules by mouth 3 times daily Muscle Mag. 2 caps contain B1 20mg, B2 20mg, B6 10mg, magesium 20mg, manganese 2mg.        UNABLE TO FIND      2 tablets 3 times daily MEDICATION NAME: Digestzymes    Thyroid cancer (H), Postsurgical hypothyroidism, Postsurgical hypoparathyroidism (H)       UNABLE TO FIND      1 tablet daily MEDICATION NAME: Pure Encapsulations    Thyroid cancer (H), Postsurgical hypothyroidism, Postsurgical hypoparathyroidism (H)       VENTOLIN  (90 Base) MCG/ACT inhaler   Generic drug:  albuterol     18 g    INHALE 2 PUFFS INTO THE LUNGS EVERY 4 HOURS AS NEEDED FOR SHORTNESS OF BREATH OR DIFFICULT BREATHING OR WHEEZING    Mild intermittent asthma without complication       vitamin D3 2000 units Caps     60 capsule    Take 5,000 Units by mouth daily Takes 2 tabs daily    Thyroid cancer (H), Postsurgical hypothyroidism, Papillary carcinoma, follicular variant (H), Postsurgical hypoparathyroidism (H)       * Notice:  This list has 9 medication(s) that are the same as other medications prescribed for you. Read the directions carefully, and ask your doctor or other care provider to review them with you.

## 2018-11-26 NOTE — PATIENT INSTRUCTIONS
Neck ultrasound at your earliest convenience    24 hour urine calcium    Labs every 6 months - you have standing orders for this    See me yearly - get neck ultrasound just before appt in a year       Thank you for choosing [facility] for your health care needs. Below is some information for patients who are interested in having their follow-up visit with a physician by telephone. In some cases, a telephone visit can be an effective and convenient way to manage your follow-up care. Choosing a telephone visit rather than a face to face visit for your follow-up care is a decision that you and your physician can make together to ensure it meets all of your needs.  A face to face visit is always an available option, if you choose to do so.     We want to make sure you have all of the information you need about the telephone visit option and answer all of your questions before you decide to schedule a telephone follow-up visit. If you have any questions, you may talk to a staff member or our financial counselor at [phone].    1.  General overview  Our clinic sees patients for a variety of conditions and concerns. A face to face visit with your doctor is required for any new concerns or for your initial visit. If you and your doctor decide that a follow up visit by telephone is appropriate, you may decide to opt for a telephone visit.     2.  Billing and insurance coverage  There is a charge for telephone visits, similar to the charge for an in-person visit. Your bill is based on the amount of time you and your physician are on the phone. We will bill each visit to your insurance company (just like your other medical visits), and you will be responsible for any costs not paid by your insurance company. The charge may be denied by your insurance company, in which case you will be responsible for the entire amount billed. The decision to cover the cost is determined by your insurance company. If  you want to know what your insurance company will cover, we encourage you to contact them to determine your coverage. The codes below are the codes we use when billing for telephone visits and the associated charges. This may help you work with your insurance company to determine your benefits.   Billing CPT codes for Telephone visits    65284 ($30), 22421 ($35), 37395 ($40)     3. Updating your consent form  You may get contacted by a staff member about updating your consent form. If it needs updating prior to your telephone visit, please visit the link below, print it and fill it out and return it to us at [facility], Mail Code [id number], [address] [place] [address].   [email or web address]

## 2018-11-26 NOTE — PROGRESS NOTES
Endocrinology Consult Note    Attending ASSESSMENT/PLAN:     1.  Papillary thyroid carcinoma, follicular variant, + ETE (minimal), bilateral, MF, node +.  MACIS is < 6 but TNM is stage III, ANSELMO recurrence risk Intermediate.  She has been treated with total Tx and CND and RRA. She now s/p ETOH treatment to 2  left neck LNs (left level 6 and  left level 3).  Left level 6 LN is gone.      Persistent thyroglobulinemia indicates we still have thyroid tissue.  Repeat Tg    2.  Cervical adenopathy - resolved on US following appt    3.  Post surgical hypothyroidism. Treat to TSH < 0.4 because of # 1.  She is on  LT4  224*7.5/week, divided (mean 240 mcg/day)    4.   Hypocalcemia with normal PTH. She has low albumin.   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   She is on calcitriol (0.75 mcg/day) , vitamin D 5000, and high dose calcium as indicated in the med list  24 hour urine Ca    History of gregory en Y gastric bypass is potentially going to affect absorption of the calcium/ vitamin D/ L-T4. I believe this is an important history relative to the hypoparathyoridsim    Kidney stones  On recent imaging. 24 hour urine calcium .    Avelina Castro MD        Cc/  HISTORY OF PRESENT ILLNESS Tisha presents today for follow up of thyroid cancer and post surgical hypothyroidism. She also has a history of breast caner and lymphoma.  She was lst seen by me 5/18.  At that time she was on   LT4  224 x 7.5/week  , divided 240 mcg/day over the course of a week.     Thyroid cancer was discovered in 2013 in the course of getting PET followup for new diagnosis of breast cancer.    Her course can be summarized as follows:  7/10/13  total Thyroidectomy and CND  removing bilateral, multifocal papillary thyroid carcinoma, left 1.3 cm (Follicular variant) with minimal  extrathyoridal extension and right 0.25 cm (microcarcinoma variant).  2/3 left level 6 LNs were + for PCT (MACIS 5.55, TNM stage III).    9/5/13: Thyrogen stimulated 105 mCi 131I .  Post treatment TBS showed intense neck uptake.      6/11/14 FNAB of left  level 3 node with ? microcalcifications cytology positive for malignancy - papillary Ca, needle wash Tg 6.4 ng/ml.    left level 6 LN  Cytology  benign butneedle wash Tg 1213 ng/ml.   8/18/14 and on 10/24/14 ETOH injection into these  2 lymph nodes.      12/30/14  repeat FNAB of medial to level 6 left level 3 and left level 6 LN .  FNAB of the left level 3 LN and left level 6 LN on 12/30 was read as benign.  Needle wash Tg was < 0.3 (left level 3) and 0.6 (left level 6).    12/30 and 12/31/14  ETOH ablation of left level 6 LN (per the images), though the report states this is left level 3 LN that was ablated.     We have the following tumor marker data  5/2/13: Tg 45, BERYL < 0.4, TSH   SURGERY  9/5/13: Tg 12.6 ng/ml, BERYL < 0.4 ,   I131 105 mCi  10/22/13: Tg 3.7, Beryl < 0.4 U/ml, TSH 0.27  3/7/14: Tg 5.4, BERYL < 0.4, TSH   6/3/14: Tg 1.3, BERYL < 0.4, TSH  Not done  7/18/14 Tg 13.6, BERYL < 0.4, TSH 16.9 Thyrogen stimulated  123I TBS negative. We did not see uptake in the cervical LNs we know are biopsy proven PCT (as is often the case).   ETOH treatments 8/14 and 10/14  11/25/14 Tg 3.5, BERYL < 0.4, TSH 4.95  12/30/14 ETOH treatment  2/17/15: Tg 1.4, BERYL < 0.4, TSH 0.41  5/18/15: Tg 1.3, BERYL < 0.4, TSH 0.42--  12/7/15: Tg 1.3, BERYL < 0.4, TSH 0.12  5/2/16 Tg 1.1 ng/ml , BERYL  < 0.4 U/ml.   11/2/16: Tg 1.4, BERYL < 0.4, TSH 0.45.  -   4/26/17: Tg 1.1, BERYL < 0.4, TSH 0.5  11/2/17  Tg 1.3 , BERYL < 0.4 - - concurrent remeasure 4/16 Tg 0.84.  TSH 0.22  5/21/18: Tg 0.43, Beryl < 0.4, TSH < 0.01, free T4 1.64- .   8/31/18: TSH 0.01, free t4 1.23    2/27/18 PET /CT: negative per my review of images today; read as minimal uptake right 10th rib bearing paravertebral soft tissue  lesion-   9/7/18 CT CAP: increased size of bilateral renal stones.   Neck US  11/27/18 (compared with 11/2/17 ; 4/26/17 ):    Left level 3 # 1 (medial to IJ): not seen on stills or cine    ROS  Breast pain is worse- works with Dr Williamson who is cutting down the meds, which   Hurst to move in the breast and where I had lumbar punctures and whre I had cancer in the back  Cramps are horrible   Fingers do weird things  Last night up all night due to symptoms - legs, feet, up into groin/ thigh, occasinoally in breast where whole breast spasms into a knot  GI : constipation  Respiratory: negative  She is not worried about the kidney stones - believes they preceded all of this and haven't changed.       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 No real need for Epipen for this alone.     GDM (gestational diabetes mellitus)     w first pregnancy only     Gestational hypertension      Lymphedema     jackson arms, chest     Migraine      Neuropathy associated with cancer (H)      Papillary carcinoma, follicular variant (H) 6/28/2013    pT3, N1, Mx, thyroid     Post-surgical hypothyroidism      Renal disease     kidney stones     Rhinitis, allergic to other allergen      Right Breast mass and microcalcifications 12/13/2011     Seasonal allergic conjunctivitis      Seasonal allergic rhinitis 4/12/11 skin tests pos. for: dog/M/T/G/W--NEGATIVE FOOD TESTS FOR: shrimp, crab, lobster, coconut     Past Surgical History:   Procedure Laterality Date     BACK SURGERY  2000,    Bulging disc w nerve root impigment     BIOPSY BREAST  1989    right     BIOPSY BREAST  12/13/11    right, core and sterotactic      BONE MARROW BIOPSY, BONE SPECIMEN, NEEDLE/TROCAR Left 10/3/2017    Procedure: BIOPSY BONE MARROW;  Bone Marrow Biopsy with aspirate;  Surgeon: Amelia Watkins PA-C;  Location: [facility] OR     BYPASS GASTRIC, CHOLECYSTECTOMY, COMBINED       C LAPAROSCOPIC GASTRIC RESTRICTIVE PX, W/GASTRIC BYPASS/ GAMALIEL-EN-Y, < 150CM      Gamaliel en Y?      SECTION       COLONOSCOPY      normal     COSMETIC SURGERY  2012    Final Stage of Mastectomy     DAVINCI HYSTERECTOMY TOTAL, BILATERAL SALPINGO-OOPHORECTOMY, COMBINED  2012    Procedure: COMBINED DAVINCI HYSTERECTOMY TOTAL, SALPINGO-OOPHORECTOMY;  Davinci Total Laparoscopic Hysterectomy, Bilateral Salpingo Oophorectomy, Pelvic Washings, Cystoscopy;  Surgeon: Evie Sheikh MD;  Location: [facility] OR     ENT SURGERY       ESOPHAGOSCOPY, GASTROSCOPY, DUODENOSCOPY (EGD), COMBINED N/A 2017    Procedure: COMBINED ENDOSCOPIC ULTRASOUND, ESOPHAGOSCOPY, GASTROSCOPY, DUODENOSCOPY (EGD), FINE NEEDLE ASPIRATE/BIOPSY;  Esophagogastroduodenoscopy, Endoscopic Ultrasound, with fine needle biopsy aspirate;  Surgeon: Patrick Robles MD;  Location: [facility] OR     GI SURGERY  2007    Gamaliel-en Y w cholecystectomy     GYN SURGERY  89,1/10/92    2 c-sections     HYSTERECTOMY, PAP NO LONGER INDICATED      DeVinci assister lap hyst with BSO     INSERT PORT VASCULAR ACCESS Right 10/4/2017    Procedure: INSERT PORT VASCULAR ACCESS;  Port Placement;  Surgeon: Jc Goodman PA-C;  Location: [facility]     IRRIGATION AND DEBRIDEMENT BREAST  3/1/2012    Procedure:IRRIGATION AND DEBRIDEMENT BREAST; Irrigation and Debridement, Wound Closure Right Breast; Surgeon:JUNG GUILLEN; Location:[facility] OR     MASTECTOMY, RECONSTRUCT BREAST, COMBINED  2012    Procedure:COMBINED MASTECTOMY, RECONSTRUCT BREAST; Bilateral Mastectomies, Right Axillary [place] Node Biopsy, Bilateral Breast Reconstruction with Tissue Expanders, Reconstruction of inframammary  fold, bilateral pain management systems; Surgeon:ANUPAM CAMPBELL; Location:[facility]     RECONSTRUCT BREAST  8/31/2012    Procedure: RECONSTRUCT BREAST;  Bilateral 2nd stage breast reconstruction, revision,       SOFT TISSUE SURGERY  1-30-12    Mastectomy w severe myofascial pain syndrome     THYROIDECTOMY  7/10/2013    Procedure: THYROIDECTOMY;  Total Thyroidectomy with central neck dissection;  Surgeon: Indiana Sneed MD;  Location: [facility] OR     TONSILLECTOMY      childhood       Medications  Current Outpatient Prescriptions   Medication Sig Dispense Refill     ACAI RAMIREZ PO Take 50 mg by mouth       acetaminophen (TYLENOL) 325 MG tablet Take 2 tablets (650 mg) by mouth every 4 hours as needed for mild pain or fever 100 tablet      aluminum chloride (DRYSOL) 20 % external solution Apply topically At Bedtime 60 mL 3     bisacodyl (DULCOLAX) 5 MG EC tablet Take 3 tablets (15 mg) by mouth daily as needed for constipation 30 tablet 0     calcitRIOL (ROCALTROL) 0.25 MCG capsule TAKE 2 CAPSULES BY MOUTH EVERY MORNING AND TAKE 1 CAPSULE BY MOUTH EVERY NIGHT AT BEDTIME 90 capsule 11     calcium Citrate-vitamin D 500-400 MG-UNIT CHEW Take 2 tablets by mouth 2 times daily 60 tablet      celecoxib (CELEBREX) 200 MG capsule Take 1 capsule (200 mg) by mouth 2 times daily 60 capsule 2     cetirizine (ZYRTEC ALLERGY) 10 MG tablet Take 1 tablet (10 mg) by mouth 3 times daily On hold for lab test. 90 tablet 0     Cholecalciferol (VITAMIN D3) 2000 units CAPS Take 5,000 Units by mouth daily  0     COMPOUND CONTAINING CONTROLLED SUBSTANCE (CMPD RX) - PHARMACY TO MIX COMPOUNDED MEDICATION Ketamine 6% + lidocaine 10% in Lipo.  Apply small amount to affected area two times daily. 120 g 6     cromolyn (OPTICROM) 4 % ophthalmic solution Place 1 drop into both eyes 4 times daily 10 mL 0     cromolyn sodium (NASALCROM) 5.2 MG/ACT AERS Inhaler SPRAY ONE SPRAY( 1 ML) IN NOSTRIL DAILY 1 Bottle 6     [employer] FIBER GUMMY BEARS CHILDREN CHEW Take 5 g  by mouth daily (2 gummy= 5 g =1 serving) 120 tablet 3     cyclobenzaprine (FLEXERIL) 10 MG tablet Take 1 tablet (10 mg) by mouth 3 times daily as needed (Patient taking differently: Take 10 mg by mouth 3 times daily ) 30 tablet 0     diazepam (VALIUM) 5 MG tablet TAKE ONE TABLET (5 MG) BY MOUTH THREE TIMES DAILY AS NEEDED FOR MUSCLE SPASMS 90 tablet 1     diclofenac (VOLTAREN) 1 % GEL topical gel Apply affected area two times daily PRN using enclosed dosing card. 100 g 3     fluticasone (FLONASE) 50 MCG/ACT spray Spray 1-2 sprays into both nostrils daily 1 Bottle 11     furosemide (LASIX) 20 MG tablet Take 1 tablet (20 mg) by mouth 2 times daily 90 tablet 3     levothyroxine (SYNTHROID/LEVOTHROID) 112 MCG tablet ON MONDAY THRU SATURDAY TAKE TWO TABLETS DAILY AND ON SUNDAY TAKE 3 TABLETS DAILY. 189 tablet 3     lidocaine (XYLOCAINE) 5 % ointment Apply quarter size amount to chest and back up to 3 times daily as needed for pain. 350 g 1     lidocaine-prilocaine (EMLA) cream Apply topically as needed (port access pain.) 30 g 1     magic mouthwash (FIRST-MOUTHWASH BLM) suspension Swish and spit 5-10 mLs in mouth every 6 hours as needed for mouth sores 237 mL 1     methocarbamol (ROBAXIN) 750 MG tablet Take 1 tablet (750 mg) by mouth 4 times daily . Ok to take a 5th Robaxin on very severe days. 120 tablet 3     montelukast (SINGULAIR) 10 MG tablet Take 2 tablets (20 mg) by mouth 2 times daily 120 tablet 11     morphine (MS CONTIN) 30 MG 12 hr tablet Take 1 tablet (30 mg) by mouth every 8 hours . Take an addition pill at night such that your evening dose is 60mg 120 tablet 0     olopatadine (PATANOL) 0.1 % ophthalmic solution Apply 1 drop to eye       ondansetron (ZOFRAN-ODT) 8 MG ODT tab DISSOLVE ONE TABLET IN MOUTH EVERY EIGHT HOURS AS NEEDED  30 tablet 3     order for DME Compression socks - knee high 20-30 mmHg zippered if possible 1 Units 11     order for DME Equipment being ordered: compression stockings. 20-30 mm  or 30 - 40 mm as patient can tolerate. Physical therapy to determine if they should be above or below the knee. 2 Units 4     order for DME Equipment being ordered: compression bra 2 Device 1     order for DME Compression stockings, medium grade, please measure and fit. Please dispense a pair. 1 Units 0     oxyCODONE IR (ROXICODONE) 30 MG tablet Take 1 tablet (30 mg) by mouth every 4 hours as needed for moderate to severe pain . This is a 30day supply 132 tablet 0     polyethylene glycol (MIRALAX) powder Take 17 g (1 capful) by mouth daily 510 g 0     potassium chloride SA (KLOR-CON) 20 MEQ CR tablet Take 1 tablet (20 mEq) by mouth 2 times daily 60 tablet 3     Probiotic Product (PROBIOTIC DAILY PO) Take 1 capsule by mouth daily Lacto acid bifidobacterium       prochlorperazine (COMPAZINE) 10 MG tablet Take 10 mg by mouth as needed  3     ranitidine (ZANTAC) 75 MG tablet Take 1 tablet (75 mg) by mouth 3 times daily 90 tablet 0     SUMAtriptan (IMITREX) 50 MG tablet Take 1 tablet (50 mg) by mouth as needed 30 tablet 3     tamoxifen (NOLVADEX) 20 MG tablet Take 1 tablet (20 mg) by mouth daily 90 tablet 3     triamcinolone (KENALOG) 0.025 % ointment Apply topically as needed  3     Triamcinolone Acetonide (AZMACORT IN) Inhale 2 puffs into the lungs as needed       UNABLE TO FIND Take 2 capsules by mouth 3 times daily Muscle Mag. 2 caps contain B1 20mg, B2 20mg, B6 10mg, magesium 20mg, manganese 2mg.       UNABLE TO FIND 3 tablets 3 times daily MEDICATION NAME: calcium D-Glucarate   3 caps contain 180mg of elemental calcium.       UNABLE TO FIND 2 tablets 3 times daily MEDICATION NAME: Digestzymes        UNABLE TO FIND 1 tablet daily MEDICATION NAME: Pure Encapsulations       VENTOLIN  (90 BASE) MCG/ACT Inhaler INHALE 2 PUFFS INTO THE LUNGS EVERY 4 HOURS AS NEEDED FOR SHORTNESS OF BREATH OR DIFFICULT BREATHING OR WHEEZING 18 g 3     acyclovir (ZOVIRAX) 400 MG tablet Take 1 tablet (400 mg) by mouth 2 times daily  (Patient not taking: Reported on 10/11/2018) 30 tablet 0     azithromycin (ZITHROMAX) 250 MG tablet Two tablets first day, then one tablet daily for four days. (Patient not taking: Reported on 10/11/2018) 6 tablet 0     EPINEPHrine (EPIPEN 2-CARIDAD) 0.3 MG/0.3ML injection 2-pack Inject 0.3 mLs (0.3 mg) into the muscle once as needed for anaphylaxis (Patient not taking: Reported on 10/11/2018) 0.6 mL 3     levofloxacin (LEVAQUIN) 750 MG tablet Take 1 tablet (750 mg) by mouth daily (Patient not taking: Reported on 10/11/2018) 10 tablet 0     lidocaine-prilocaine (EMLA) cream Apply topically as needed for moderate pain (Patient not taking: Reported on 11/26/2018) 60 g 1     magic mouthwash suspension (diphenhydrAMINE, lidocaine, aluminum-magnesium & simethicone) Swish and spit 10 mLs in mouth every 6 hours as needed for mouth sores (Patient not taking: Reported on 10/11/2018) 360 mL 1     naloxone (NARCAN) nasal spray Spray 1 spray (4 mg) into one nostril alternating nostrils as needed for opioid reversal every 2-3 minutes until assistance arrives (Patient not taking: Reported on 10/11/2018) 0.2 mL 0     ondansetron (ZOFRAN-ODT) 8 MG ODT tab Take 1 tablet (8 mg) by mouth every 8 hours as needed (Patient not taking: Reported on 11/26/2018) 30 tablet 1     senna-docusate (SENOKOT-S;PERICOLACE) 8.6-50 MG per tablet Take 1-2 tablets by mouth 2 times daily as needed for constipation (Patient not taking: Reported on 10/11/2018) 60 tablet 0     [DISCONTINUED] furosemide (LASIX) 20 MG tablet Take 1 tablet (20 mg) by mouth 2 times daily (Patient not taking: Reported on 11/26/2018) 60 tablet 3     [DISCONTINUED] furosemide (LASIX) 20 MG tablet Take 1 tablet (20 mg) by mouth 2 times daily 60 tablet 0     [DISCONTINUED] tamoxifen (NOLVADEX) 20 MG tablet Take 1 tablet (20 mg) by mouth daily 90 tablet 3     Calcium citrate 630 +  D3 500  BID  TUMS ultra 1000 mg prn severe muscle cramps-- up to 4-6 /day  Vitamin D is 5000/day  Mg 200/  manganese 2 mg tid     Allergies  Allergies   Allergen Reactions     Baclofen Other (See Comments) and Unknown     Other reaction(s): Edema, chest pain, seizures.      Duloxetine Anaphylaxis and Other (See Comments)     Flushing, tremor/muscle twitching and edema  Serotonin syndrome     No Clinical Screening - See Comments Shortness Of Breath, Palpitations, Anaphylaxis, Itching, Swelling, Difficulty breathing and Rash     Sukhjinder wipes- oral allergy -  July 2015: throat tightness from a Chinese herbal medicine Wilmer Tran     Serotonin Anxiety, Other (See Comments) and Swelling     Seizures      Tree Nuts [Nuts] Shortness Of Breath     Amitriptyline Hcl Swelling     Birch Trees      Potatoes, carrots, cherries, celery, apple, pears, plums, peaches, parsnip, kiwi, hazelnuts, and apricots,      Blue Dyes Itching     Headaches       Cymbalta Other (See Comments)     Flushing, tremor/muscle twitching and edema     Gabapentin Other (See Comments)     edema  Systemic edema, weaned off from Feb to March per Dr. Dowd.    edema     Grass      Mugwort [Artemisia Vulgaris]      Various spices     Ragweeds      Melons, bananas, cucumbers, zucchini.     Topamax      Nortriptyline Itching, Visual Disturbance, Swelling, GI Disturbance, Anxiety, Other (See Comments) and Nausea     Other reaction(s): Swelling       Family History  No thyroid  Family History   Problem Relation Age of Onset     Allergies Mother      Arthritis Mother      Cancer Mother      uterine/uterine     Hypertension Mother      on HCTZ     Hyperlipidemia Mother      Cerebrovascular Disease Mother      s/p brain surgery     Breast Cancer Mother      Depression Mother      Anxiety Disorder Mother      Anesthesia Reaction Mother      Asthma Mother      Skin Cancer Mother      HEART DISEASE Father      Diabetes Father      Coronary Artery Disease Father      Hypertension Father      Hyperlipidemia Father      Cerebrovascular Disease Father      Multiple  "strokes     Obesity Father      Diabetes Brother      Depression Brother      Hypertension Brother      Cancer Maternal Grandfather      pancreatic cancer/stomach cancer     Prostate Cancer Maternal Grandfather      Prostrate and Bladder     Other Cancer Maternal Grandfather      Pancreatic 1988, Bladder 1977     Cancer Maternal Grandmother      uterine     Breast Cancer Maternal Grandmother      Skin Cancer Maternal Grandmother      Cancer Brother 57     pancreatic cancer     Diabetes Brother      Breast Cancer Cousin      Grand mothers sister     Other Cancer Brother      Stage 3 Pancreatic 2-2015     Depression Brother      Asthma Brother      Obesity Brother      Anesthesia Reaction Other      H/a, itching, drug interactions     Asthma Other      Cancer Brother      Pancreatic      Thyroid Disease No family hx of        Social History  Social History   Substance Use Topics     Smoking status: Never Smoker     Smokeless tobacco: Never Used      Comment: no 2nd hand smoke exposure     Alcohol use No      Comment: rare     RN.  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  GENERAL :   middle aged woman.  Tears lst 1/2 of appt, NAD/ some smiles 2nd half  SKIN: Normal color;  scarf on head but I can see hair growth below  EYES: PER,No scleral icterus  NECK no masses. There are no abnormal masses  LUNGS: clear bilaterally  CARDIAC: RRR S1 S2  NEURO: awake, alert, responds appropriately to questions.  Moves all extremities;    DATA REVIEW    ENDO THYROID LABS-[facility] Latest Ref Rng & Units 8/31/2018 5/21/2018   TSH 0.40 - 4.00 mU/L 0.01 (L) <0.01 (L)   T4 FREE 0.76 - 1.46 ng/dL 1.23 1.64 (H)     ENDO THYROID LABS-[facility] Latest Ref Rng & Units 2/16/2018   TSH 0.40 - 4.00 mU/L 0.01 (L)   T4 FREE 0.76 - 1.46 ng/dL 1.14 "     ENDO CALCIUM LABS-[facility] Latest Ref Rng & Units 8/31/2018   CALCIUM 8.5 - 10.1 mg/dL 8.4 (L)   CALCIUM IONIZED 4.4 - 5.2 mg/dL 4.4   CALCIUM IONIZED WHOLE BLOOD 4.4 - 5.2 mg/dL    PHOSPHOROUS 2.5 - 4.5 mg/dL 3.7   MAGNESIUM 1.6 - 2.3 mg/dL 2.0   ALBUMIN 3.4 - 5.0 g/dL 4.0   BUN 7 - 30 mg/dL 17   CREATININE 0.52 - 1.04 mg/dL 1.11 (H)   PARATHYROID HORMONE INTACT 12 - 72 pg/mL    ALKPHOS 40 - 150 U/L 97   25 OH VIT D2 ug/L <5   25 OH VIT D3 ug/L 57   25 OH VIT D TOTAL 20 - 75 ug/L <62   VITAMIN D DEFICIENCY SCREENING 20 - 75 ug/L    PROTEIN, TOTAL 6.8 - 8.8 g/dL 7.3   CALCIUM URINE G/24 H 0.10 - 0.30 g/24 h      ENDO CALCIUM LABS-[facility] Latest Ref Rng & Units 5/21/2018   CALCIUM 8.5 - 10.1 mg/dL 8.6   CALCIUM IONIZED 4.4 - 5.2 mg/dL    CALCIUM IONIZED WHOLE BLOOD 4.4 - 5.2 mg/dL    PHOSPHOROUS 2.5 - 4.5 mg/dL 4.0   MAGNESIUM 1.6 - 2.3 mg/dL    ALBUMIN 3.4 - 5.0 g/dL 3.9   BUN 7 - 30 mg/dL 15   CREATININE 0.52 - 1.04 mg/dL 0.98   PARATHYROID HORMONE INTACT 12 - 72 pg/mL    ALKPHOS 40 - 150 U/L 99   25 OH VIT D2 ug/L    25 OH VIT D3 ug/L    25 OH VIT D TOTAL 20 - 75 ug/L    VITAMIN D DEFICIENCY SCREENING 20 - 75 ug/L    PROTEIN, TOTAL 6.8 - 8.8 g/dL 7.2   CALCIUM URINE G/24 H 0.10 - 0.30 g/24 h      ENDO CALCIUM LABS-[facility] Latest Ref Rng & Units 2/23/2018   CALCIUM 8.5 - 10.1 mg/dL 8.4 (L)   CALCIUM IONIZED 4.4 - 5.2 mg/dL    CALCIUM IONIZED WHOLE BLOOD 4.4 - 5.2 mg/dL    PHOSPHOROUS 2.5 - 4.5 mg/dL    MAGNESIUM 1.6 - 2.3 mg/dL    ALBUMIN 3.4 - 5.0 g/dL    BUN 7 - 30 mg/dL 12   CREATININE 0.52 - 1.04 mg/dL 0.78   PARATHYROID HORMONE INTACT 12 - 72 pg/mL    ALKPHOS 40 - 150 U/L    25 OH VIT D2 ug/L    25 OH VIT D3 ug/L    25 OH VIT D TOTAL 20 - 75 ug/L    VITAMIN D DEFICIENCY SCREENING 20 - 75 ug/L    PROTEIN, TOTAL 6.8 - 8.8 g/dL    CALCIUM URINE G/24 H 0.10 - 0.30 g/24 h      ENDO CALCIUM LABS-[facility] Latest Ref Rng & Units 2/16/2018   CALCIUM 8.5 - 10.1 mg/dL 8.4 (L)   CALCIUM IONIZED 4.4 - 5.2 mg/dL    CALCIUM IONIZED WHOLE  BLOOD 4.4 - 5.2 mg/dL    PHOSPHOROUS 2.5 - 4.5 mg/dL    MAGNESIUM 1.6 - 2.3 mg/dL    ALBUMIN 3.4 - 5.0 g/dL 3.0 (L)   BUN 7 - 30 mg/dL 12   CREATININE 0.52 - 1.04 mg/dL 0.86   PARATHYROID HORMONE INTACT 12 - 72 pg/mL    ALKPHOS 40 - 150 U/L 70   25 OH VIT D2 ug/L    25 OH VIT D3 ug/L    25 OH VIT D TOTAL 20 - 75 ug/L    VITAMIN D DEFICIENCY SCREENING 20 - 75 ug/L    PROTEIN, TOTAL 6.8 - 8.8 g/dL 5.8 (L)   CALCIUM URINE G/24 H 0.10 - 0.30 g/24 h        EXAMINATION: US HEAD NECK SOFT TISSUE, 11/27/2018 12:17 PM      COMPARISON: Ultrasound soft tissue neck 11/2/2017 and CT 9/7/2018.     HISTORY: Papillary carcinoma, follicular variant. Additional history  of lymphoma.     FINDINGS: No soft tissue was seen in the thyroid bed.     Lymph nodes are measured bilaterally with measurements given in  transverse, AP, and craniocaudal dimensions as follows:     Right:     Level 2: Benign-appearing oval lymph node with preserved fatty hilum  measuring 1.1 x 0.5 x 1.3 cm, with no internal vascularity.  Level 3: No enlarged lymph node.  Level 4: No enlarged lymph node.  Level 5: No enlarged lymph node.  Level 6: No enlarged lymph node.     Left:  Level 2: No enlarged lymph node.  Level 3: No enlarged lymph node.  Level 4: No enlarged lymph node.  Level 5: No enlarged lymph node.  Level 6: No enlarged lymph node.         IMPRESSION: No pathologically enlarged or morphologically suspicious  lymph nodes in the neck.     I have personally reviewed the examination and initial interpretation  and I agree with the findings.     WENCESLAO YOO MD

## 2018-11-26 NOTE — NURSING NOTE
"Oncology Rooming Note    November 26, 2018 3:32 PM   Tisha Arias is a 58 year old female who presents for:    Chief Complaint   Patient presents with     Oncology Clinic Visit     [facility] RETURN- BREAST CA     Initial Vitals: /81 (BP Location: Left arm, Patient Position: Chair, Cuff Size: Adult Regular)  Pulse 120  Temp 98.6  F (37  C) (Oral)  Resp 16  Ht 1.651 m (5' 5\")  Wt 80.3 kg (177 lb 1.6 oz)  SpO2 96%  BMI 29.47 kg/m2 Estimated body mass index is 29.47 kg/(m^2) as calculated from the following:    Height as of this encounter: 1.651 m (5' 5\").    Weight as of this encounter: 80.3 kg (177 lb 1.6 oz). Body surface area is 1.92 meters squared.  Extreme Pain (9) Comment: Data Unavailable   No LMP recorded. Patient has had a hysterectomy.  Allergies reviewed: Yes  Medications reviewed: Yes    Medications: MEDICATION REFILLS NEEDED TODAY. Provider was notified.  Pharmacy name entered into [facility]:    [id number] - [place], [place] - [address] [facility]. [facility] - [place], [place] - [address] [place] [address]    Clinical concerns: Tamoxifen, lidocaine cream, and lasix refill. Needs form filled out for medical leave of absence. Misbah was notified.    10 minutes for nursing intake (face to face time)     Corey Driver LPN            "

## 2018-11-26 NOTE — LETTER
11/26/2018       RE: Tisha Arias  [address] [place] [address]     Dear Colleague,    Thank you for referring your patient, Tisha Arias, to the [facility] ENDOCRINOLOGY at [facility]. Please see a copy of my visit note below.    Endocrinology Consult Note    Attending ASSESSMENT/PLAN:     1.  Papillary thyroid carcinoma, follicular variant, + ETE (minimal), bilateral, MF, node +.  MACIS is < 6 but TNM is stage III, ANSELMO recurrence risk Intermediate.  She has been treated with total Tx and CND and RRA. She now s/p ETOH treatment to 2  left neck LNs (left level 6 and  left level 3).  Left level 6 LN is gone.      Persistent thyroglobulinemia indicates we still have thyroid tissue.  Repeat Tg    2.  Cervical adenopathy - resolved on US following appt    3.  Post surgical hypothyroidism. Treat to TSH < 0.4 because of # 1.  She is on  LT4  224*7.5/week, divided (mean 240 mcg/day)    4.   Hypocalcemia with normal PTH. She has low albumin.   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   She is on calcitriol (0.75 mcg/day) , vitamin D 5000, and high dose calcium as indicated in the med list  24 hour urine Ca    History of gregory en Y gastric bypass is potentially going to affect absorption of the calcium/ vitamin D/ L-T4. I believe this is an important history relative to the hypoparathyoridsim    Kidney stones  On recent imaging. 24 hour urine calcium .    Avelina Castro MD        Cc/  HISTORY OF PRESENT ILLNESS Tisha presents today for follow up of thyroid cancer and post surgical hypothyroidism. She also has a history of breast caner and lymphoma.  She was lst seen by me 5/18.  At that time she was on   LT4  224 x 7.5/week  , divided 240 mcg/day over the course of a week.     Thyroid cancer  was discovered in 2013 in the course of getting PET followup for new diagnosis of breast cancer.    Her course can be summarized as follows:  7/10/13  total Thyroidectomy and CND  removing bilateral, multifocal papillary thyroid carcinoma, left 1.3 cm (Follicular variant) with minimal extrathyoridal extension and right 0.25 cm (microcarcinoma variant).  2/3 left level 6 LNs were + for PCT (MACIS 5.55, TNM stage III).    9/5/13: Thyrogen stimulated 105 mCi 131I .  Post treatment TBS showed intense neck uptake.      6/11/14 FNAB of left  level 3 node with ? microcalcifications cytology positive for malignancy - papillary Ca, needle wash Tg 6.4 ng/ml.    left level 6 LN  Cytology  benign butneedle wash Tg 1213 ng/ml.   8/18/14 and on 10/24/14 ETOH injection into these  2 lymph nodes.      12/30/14  repeat FNAB of medial to level 6 left level 3 and left level 6 LN .  FNAB of the left level 3 LN and left level 6 LN on 12/30 was read as benign.  Needle wash Tg was < 0.3 (left level 3) and 0.6 (left level 6).    12/30 and 12/31/14  ETOH ablation of left level 6 LN (per the images), though the report states this is left level 3 LN that was ablated.     We have the following tumor marker data  5/2/13: Tg 45, ANSELMO < 0.4, TSH   SURGERY  9/5/13: Tg 12.6 ng/ml, ANSELMO < 0.4 ,   I131 105 mCi  10/22/13: Tg 3.7, Anselmo < 0.4 U/ml, TSH 0.27  3/7/14: Tg 5.4, ANSELMO < 0.4, TSH   6/3/14: Tg 1.3, ANSELMO < 0.4, TSH  Not done  7/18/14 Tg 13.6, ANSELMO < 0.4, TSH 16.9 Thyrogen stimulated  123I TBS negative. We did not see uptake in the cervical LNs we know are biopsy proven PCT (as is often the case).   ETOH treatments 8/14 and 10/14  11/25/14 Tg 3.5, ANSELMO < 0.4, TSH 4.95  12/30/14 ETOH treatment  2/17/15: Tg 1.4, ANSELMO < 0.4, TSH 0.41  5/18/15: Tg 1.3, ANSELMO < 0.4, TSH 0.42--  12/7/15: Tg 1.3, ANSELMO < 0.4, TSH 0.12  5/2/16 Tg 1.1 ng/ml , ANSELMO  < 0.4 U/ml.   11/2/16: Tg 1.4, ANSELMO < 0.4, TSH 0.45.  -   4/26/17: Tg 1.1, ANSELMO < 0.4, TSH 0.5  11/2/17  Tg 1.3 ,  BERYL < 0.4 - - concurrent remeasure 4/16 Tg 0.84.  TSH 0.22  5/21/18: Tg 0.43, Beryl < 0.4, TSH < 0.01, free T4 1.64- .   8/31/18: TSH 0.01, free t4 1.23    2/27/18 PET /CT: negative per my review of images today; read as minimal uptake right 10th rib bearing paravertebral soft tissue lesion-   9/7/18 CT CAP: increased size of bilateral renal stones.   Neck US  11/27/18 (compared with 11/2/17 ; 4/26/17 ):    Left level 3 # 1 (medial to IJ): not seen on stills or cine    ROS  Breast pain is worse- works with Dr Williamson who is cutting down the meds, which   Hurst to move in the breast and where I had lumbar punctures and whre I had cancer in the back  Cramps are horrible   Fingers do weird things  Last night up all night due to symptoms - legs, feet, up into groin/ thigh, occasinoally in breast where whole breast spasms into a knot  GI : constipation  Respiratory: negative  She is not worried about the kidney stones - believes they preceded all of this and haven't changed.       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 No real need for Epipen for this alone.     GDM (gestational diabetes mellitus)     w first pregnancy only     Gestational hypertension      Lymphedema     jackson arms, chest     Migraine      Neuropathy associated with cancer (H)      Papillary carcinoma, follicular variant (H) 6/28/2013    pT3, N1, Mx, thyroid     Post-surgical hypothyroidism      Renal disease     kidney stones     Rhinitis, allergic to other allergen      Right Breast mass and microcalcifications 12/13/2011     Seasonal allergic conjunctivitis      Seasonal allergic rhinitis 4/12/11 skin tests pos.  for: dog/M/T/G/W--NEGATIVE FOOD TESTS FOR: shrimp, crab, lobster, coconut     Past Surgical History:   Procedure Laterality Date     BACK SURGERY  ,    Bulging disc w nerve root impigment     BIOPSY BREAST      right     BIOPSY BREAST  11    right, core and sterotactic     BONE MARROW BIOPSY, BONE SPECIMEN, NEEDLE/TROCAR Left 10/3/2017    Procedure: BIOPSY BONE MARROW;  Bone Marrow Biopsy with aspirate;  Surgeon: Amelia Watkins PA-C;  [facility]: [facility]     BYPASS GASTRIC, CHOLECYSTECTOMY, COMBINED       C LAPAROSCOPIC GASTRIC RESTRICTIVE PX, W/GASTRIC BYPASS/ GAMALIEL-EN-Y, < 150CM      Gamaliel en Y?      SECTION       COLONOSCOPY      normal     COSMETIC SURGERY  2012    Final Stage of Mastectomy     DAVINCI HYSTERECTOMY TOTAL, BILATERAL SALPINGO-OOPHORECTOMY, COMBINED  2012    Procedure: COMBINED DAVINCI HYSTERECTOMY TOTAL, SALPINGO-OOPHORECTOMY;  Davinci Total Laparoscopic Hysterectomy, Bilateral Salpingo Oophorectomy, Pelvic Washings, Cystoscopy;  Surgeon: Evie Sheikh MD;  Location: [facility] OR     ENT SURGERY       ESOPHAGOSCOPY, GASTROSCOPY, DUODENOSCOPY (EGD), COMBINED N/A 2017    Procedure: COMBINED ENDOSCOPIC ULTRASOUND, ESOPHAGOSCOPY, GASTROSCOPY, DUODENOSCOPY (EGD), FINE NEEDLE ASPIRATE/BIOPSY;  Esophagogastroduodenoscopy, Endoscopic Ultrasound, with fine needle biopsy aspirate;  Surgeon: Patrick Robles MD;  Location: [facility] OR     GI SURGERY  2007    Gamaliel-en Y w cholecystectomy     GYN SURGERY  89,1/10/92    2 c-sections     HYSTERECTOMY, PAP NO LONGER INDICATED      DeVinci assister lap hyst with BSO     INSERT PORT VASCULAR ACCESS Right 10/4/2017    Procedure: INSERT PORT VASCULAR ACCESS;  Port Placement;  Surgeon: Jc Goodman PA-C;  [facility]     IRRIGATION AND DEBRIDEMENT BREAST  3/1/2012    Procedure:IRRIGATION AND DEBRIDEMENT BREAST; Irrigation and Debridement, Wound Closure Right Breast; Surgeon:CUNNINGHAM,  JUNG; Location:[facility] OR     MASTECTOMY, RECONSTRUCT BREAST, COMBINED  1/30/2012    Procedure:COMBINED MASTECTOMY, RECONSTRUCT BREAST; Bilateral Mastectomies, Right Axillary Hartshorne Node Biopsy, Bilateral Breast Reconstruction with Tissue Expanders, Reconstruction of inframammary fold, bilateral pain management systems; Surgeon:ANUPAM CAMPBELL; Location:[facility] OR     RECONSTRUCT BREAST  8/31/2012    Procedure: RECONSTRUCT BREAST;  Bilateral 2nd stage breast reconstruction, revision,       SOFT TISSUE SURGERY  1-30-12    Mastectomy w severe myofascial pain syndrome     THYROIDECTOMY  7/10/2013    Procedure: THYROIDECTOMY;  Total Thyroidectomy with central neck dissection;  Surgeon: Indiana Sneed MD;  Location: [facility] OR     TONSILLECTOMY      childhood       Medications  Current Outpatient Prescriptions   Medication Sig Dispense Refill     ACAI RAMIREZ PO Take 50 mg by mouth       acetaminophen (TYLENOL) 325 MG tablet Take 2 tablets (650 mg) by mouth every 4 hours as needed for mild pain or fever 100 tablet      aluminum chloride (DRYSOL) 20 % external solution Apply topically At Bedtime 60 mL 3     bisacodyl (DULCOLAX) 5 MG EC tablet Take 3 tablets (15 mg) by mouth daily as needed for constipation 30 tablet 0     calcitRIOL (ROCALTROL) 0.25 MCG capsule TAKE 2 CAPSULES BY MOUTH EVERY MORNING AND TAKE 1 CAPSULE BY MOUTH EVERY NIGHT AT BEDTIME 90 capsule 11     calcium Citrate-vitamin D 500-400 MG-UNIT CHEW Take 2 tablets by mouth 2 times daily 60 tablet      celecoxib (CELEBREX) 200 MG capsule Take 1 capsule (200 mg) by mouth 2 times daily 60 capsule 2     cetirizine (ZYRTEC ALLERGY) 10 MG tablet Take 1 tablet (10 mg) by mouth 3 times daily On hold for lab test. 90 tablet 0     Cholecalciferol (VITAMIN D3) 2000 units CAPS Take 5,000 Units by mouth daily  0     COMPOUND CONTAINING CONTROLLED SUBSTANCE (CMPD RX) - PHARMACY TO MIX COMPOUNDED MEDICATION Ketamine 6% + lidocaine 10% in Lipo.  Apply small amount to affected area  two times daily. 120 g 6     cromolyn (OPTICROM) 4 % ophthalmic solution Place 1 drop into both eyes 4 times daily 10 mL 0     cromolyn sodium (NASALCROM) 5.2 MG/ACT AERS Inhaler SPRAY ONE SPRAY( 1 ML) IN NOSTRIL DAILY 1 Bottle 6     [employer] FIBER GUMMY BEARS CHILDREN CHEW Take 5 g by mouth daily (2 gummy= 5 g =1 serving) 120 tablet 3     cyclobenzaprine (FLEXERIL) 10 MG tablet Take 1 tablet (10 mg) by mouth 3 times daily as needed (Patient taking differently: Take 10 mg by mouth 3 times daily ) 30 tablet 0     diazepam (VALIUM) 5 MG tablet TAKE ONE TABLET (5 MG) BY MOUTH THREE TIMES DAILY AS NEEDED FOR MUSCLE SPASMS 90 tablet 1     diclofenac (VOLTAREN) 1 % GEL topical gel Apply affected area two times daily PRN using enclosed dosing card. 100 g 3     fluticasone (FLONASE) 50 MCG/ACT spray Spray 1-2 sprays into both nostrils daily 1 Bottle 11     furosemide (LASIX) 20 MG tablet Take 1 tablet (20 mg) by mouth 2 times daily 90 tablet 3     levothyroxine (SYNTHROID/LEVOTHROID) 112 MCG tablet ON MONDAY THRU SATURDAY TAKE TWO TABLETS DAILY AND ON SUNDAY TAKE 3 TABLETS DAILY. 189 tablet 3     lidocaine (XYLOCAINE) 5 % ointment Apply quarter size amount to chest and back up to 3 times daily as needed for pain. 350 g 1     lidocaine-prilocaine (EMLA) cream Apply topically as needed (port access pain.) 30 g 1     magic mouthwash (FIRST-MOUTHWASH BLM) suspension Swish and spit 5-10 mLs in mouth every 6 hours as needed for mouth sores 237 mL 1     methocarbamol (ROBAXIN) 750 MG tablet Take 1 tablet (750 mg) by mouth 4 times daily . Ok to take a 5th Robaxin on very severe days. 120 tablet 3     montelukast (SINGULAIR) 10 MG tablet Take 2 tablets (20 mg) by mouth 2 times daily 120 tablet 11     morphine (MS CONTIN) 30 MG 12 hr tablet Take 1 tablet (30 mg) by mouth every 8 hours . Take an addition pill at night such that your evening dose is 60mg 120 tablet 0     olopatadine (PATANOL) 0.1 % ophthalmic solution Apply 1 drop to eye        ondansetron (ZOFRAN-ODT) 8 MG ODT tab DISSOLVE ONE TABLET IN MOUTH EVERY EIGHT HOURS AS NEEDED  30 tablet 3     order for DME Compression socks - knee high 20-30 mmHg zippered if possible 1 Units 11     order for DME Equipment being ordered: compression stockings. 20-30 mm or 30 - 40 mm as patient can tolerate. Physical therapy to determine if they should be above or below the knee. 2 Units 4     order for DME Equipment being ordered: compression bra 2 Device 1     order for DME Compression stockings, medium grade, please measure and fit. Please dispense a pair. 1 Units 0     oxyCODONE IR (ROXICODONE) 30 MG tablet Take 1 tablet (30 mg) by mouth every 4 hours as needed for moderate to severe pain . This is a 30day supply 132 tablet 0     polyethylene glycol (MIRALAX) powder Take 17 g (1 capful) by mouth daily 510 g 0     potassium chloride SA (KLOR-CON) 20 MEQ CR tablet Take 1 tablet (20 mEq) by mouth 2 times daily 60 tablet 3     Probiotic Product (PROBIOTIC DAILY PO) Take 1 capsule by mouth daily Lacto acid bifidobacterium       prochlorperazine (COMPAZINE) 10 MG tablet Take 10 mg by mouth as needed  3     ranitidine (ZANTAC) 75 MG tablet Take 1 tablet (75 mg) by mouth 3 times daily 90 tablet 0     SUMAtriptan (IMITREX) 50 MG tablet Take 1 tablet (50 mg) by mouth as needed 30 tablet 3     tamoxifen (NOLVADEX) 20 MG tablet Take 1 tablet (20 mg) by mouth daily 90 tablet 3     triamcinolone (KENALOG) 0.025 % ointment Apply topically as needed  3     Triamcinolone Acetonide (AZMACORT IN) Inhale 2 puffs into the lungs as needed       UNABLE TO FIND Take 2 capsules by mouth 3 times daily Muscle Mag. 2 caps contain B1 20mg, B2 20mg, B6 10mg, magesium 20mg, manganese 2mg.       UNABLE TO FIND 3 tablets 3 times daily MEDICATION NAME: calcium D-Glucarate   3 caps contain 180mg of elemental calcium.       UNABLE TO FIND 2 tablets 3 times daily MEDICATION NAME: Digestzymes        UNABLE TO FIND 1 tablet daily MEDICATION  NAME: Pure Encapsulations       VENTOLIN  (90 BASE) MCG/ACT Inhaler INHALE 2 PUFFS INTO THE LUNGS EVERY 4 HOURS AS NEEDED FOR SHORTNESS OF BREATH OR DIFFICULT BREATHING OR WHEEZING 18 g 3     acyclovir (ZOVIRAX) 400 MG tablet Take 1 tablet (400 mg) by mouth 2 times daily (Patient not taking: Reported on 10/11/2018) 30 tablet 0     azithromycin (ZITHROMAX) 250 MG tablet Two tablets first day, then one tablet daily for four days. (Patient not taking: Reported on 10/11/2018) 6 tablet 0     EPINEPHrine (EPIPEN 2-CARIDAD) 0.3 MG/0.3ML injection 2-pack Inject 0.3 mLs (0.3 mg) into the muscle once as needed for anaphylaxis (Patient not taking: Reported on 10/11/2018) 0.6 mL 3     levofloxacin (LEVAQUIN) 750 MG tablet Take 1 tablet (750 mg) by mouth daily (Patient not taking: Reported on 10/11/2018) 10 tablet 0     lidocaine-prilocaine (EMLA) cream Apply topically as needed for moderate pain (Patient not taking: Reported on 11/26/2018) 60 g 1     magic mouthwash suspension (diphenhydrAMINE, lidocaine, aluminum-magnesium & simethicone) Swish and spit 10 mLs in mouth every 6 hours as needed for mouth sores (Patient not taking: Reported on 10/11/2018) 360 mL 1     naloxone (NARCAN) nasal spray Spray 1 spray (4 mg) into one nostril alternating nostrils as needed for opioid reversal every 2-3 minutes until assistance arrives (Patient not taking: Reported on 10/11/2018) 0.2 mL 0     ondansetron (ZOFRAN-ODT) 8 MG ODT tab Take 1 tablet (8 mg) by mouth every 8 hours as needed (Patient not taking: Reported on 11/26/2018) 30 tablet 1     senna-docusate (SENOKOT-S;PERICOLACE) 8.6-50 MG per tablet Take 1-2 tablets by mouth 2 times daily as needed for constipation (Patient not taking: Reported on 10/11/2018) 60 tablet 0     [DISCONTINUED] furosemide (LASIX) 20 MG tablet Take 1 tablet (20 mg) by mouth 2 times daily (Patient not taking: Reported on 11/26/2018) 60 tablet 3     [DISCONTINUED] furosemide (LASIX) 20 MG tablet Take 1 tablet  (20 mg) by mouth 2 times daily 60 tablet 0     [DISCONTINUED] tamoxifen (NOLVADEX) 20 MG tablet Take 1 tablet (20 mg) by mouth daily 90 tablet 3     Calcium citrate 630 +  D3 500  BID  TUMS ultra 1000 mg prn severe muscle cramps-- up to 4-6 /day  Vitamin D is 5000/day  Mg 200/ manganese 2 mg tid     Allergies  Allergies   Allergen Reactions     Baclofen Other (See Comments) and Unknown     Other reaction(s): Edema, chest pain, seizures.      Duloxetine Anaphylaxis and Other (See Comments)     Flushing, tremor/muscle twitching and edema  Serotonin syndrome     No Clinical Screening - See Comments Shortness Of Breath, Palpitations, Anaphylaxis, Itching, Swelling, Difficulty breathing and Rash     Sukhjinder wipes- oral allergy -  July 2015: throat tightness from a Chinese herbal medicine Guillen Raciel Rosamaria Barry Tran     Serotonin Anxiety, Other (See Comments) and Swelling     Seizures      Tree Nuts [Nuts] Shortness Of Breath     Amitriptyline Hcl Swelling     Birch Trees      Potatoes, carrots, cherries, celery, apple, pears, plums, peaches, parsnip, kiwi, hazelnuts, and apricots,      Blue Dyes Itching     Headaches       Cymbalta Other (See Comments)     Flushing, tremor/muscle twitching and edema     Gabapentin Other (See Comments)     edema  Systemic edema, weaned off from Feb to March per Dr. Dowd.    edema     Grass      Mugwort [Artemisia Vulgaris]      Various spices     Ragweeds      Melons, bananas, cucumbers, zucchini.     Topamax      Nortriptyline Itching, Visual Disturbance, Swelling, GI Disturbance, Anxiety, Other (See Comments) and Nausea     Other reaction(s): Swelling       Family History  No thyroid  Family History   Problem Relation Age of Onset     Allergies Mother      Arthritis Mother      Cancer Mother      uterine/uterine     Hypertension Mother      on HCTZ     Hyperlipidemia Mother      Cerebrovascular Disease Mother      s/p brain surgery     Breast Cancer Mother      Depression Mother      Anxiety  "Disorder Mother      Anesthesia Reaction Mother      Asthma Mother      Skin Cancer Mother      HEART DISEASE Father      Diabetes Father      Coronary Artery Disease Father      Hypertension Father      Hyperlipidemia Father      Cerebrovascular Disease Father      Multiple strokes     Obesity Father      Diabetes Brother      Depression Brother      Hypertension Brother      Cancer Maternal Grandfather      pancreatic cancer/stomach cancer     Prostate Cancer Maternal Grandfather      Prostrate and Bladder     Other Cancer Maternal Grandfather      Pancreatic 1988, Bladder 1977     Cancer Maternal Grandmother      uterine     Breast Cancer Maternal Grandmother      Skin Cancer Maternal Grandmother      Cancer Brother 57     pancreatic cancer     Diabetes Brother      Breast Cancer Cousin      Grand mothers sister     Other Cancer Brother      Stage 3 Pancreatic 2-2015     Depression Brother      Asthma Brother      Obesity Brother      Anesthesia Reaction Other      H/a, itching, drug interactions     Asthma Other      Cancer Brother      Pancreatic      Thyroid Disease No family hx of        Social History  Social History   Substance Use Topics     Smoking status: Never Smoker     Smokeless tobacco: Never Used      Comment: no 2nd hand smoke exposure     Alcohol use No      Comment: rare     RN.  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  GENERAL :   middle aged woman.  Tears lst 1/2 of appt, NAD/ some smiles 2nd half  SKIN: Normal color;  scarf on head but I can see hair growth below  EYES: PER,No scleral icterus  NECK no masses. There are no abnormal masses  LUNGS: clear bilaterally  CARDIAC: RRR S1 S2  NEURO: awake, alert, responds appropriately to questions.  Moves all extremities;    DATA " REVIEW    [facility] Latest Ref Rng & Units 8/31/2018 5/21/2018   TSH 0.40 - 4.00 mU/L 0.01 (L) <0.01 (L)   T4 FREE 0.76 - 1.46 ng/dL 1.23 1.64 (H)     [facility] Latest Ref Rng & Units 2/16/2018   TSH 0.40 - 4.00 mU/L 0.01 (L)   T4 FREE 0.76 - 1.46 ng/dL 1.14     END[facility]UM [facility] Latest Ref Rng & Units 8/31/2018   CALCIUM 8.5 - 10.1 mg/dL 8.4 (L)   CALCIUM IONIZED 4.4 - 5.2 mg/dL 4.4   CALCIUM IONIZED WHOLE BLOOD 4.4 - 5.2 mg/dL    PHOSPHOROUS 2.5 - 4.5 mg/dL 3.7   MAGNESIUM 1.6 - 2.3 mg/dL 2.0   ALBUMIN 3.4 - 5.0 g/dL 4.0   BUN 7 - 30 mg/dL 17   CREATININE 0.52 - 1.04 mg/dL 1.11 (H)   PARATHYROID HORMONE INTACT 12 - 72 pg/mL    ALKPHOS 40 - 150 U/L 97   25 OH VIT D2 ug/L <5   25 OH VIT D3 ug/L 57   25 OH VIT D TOTAL 20 - 75 ug/L <62   VITAMIN D DEFICIENCY SCREENING 20 - 75 ug/L    PROTEIN, TOTAL 6.8 - 8.8 g/dL 7.3   CALCIUM URINE G/24 H 0.10 - 0.30 g/24 h      END[facility] Latest Ref Rng & Units 5/21/2018   CALCIUM 8.5 - 10.1 mg/dL 8.6   CALCIUM IONIZED 4.4 - 5.2 mg/dL    CALCIUM IONIZED WHOLE BLOOD 4.4 - 5.2 mg/dL    PHOSPHOROUS 2.5 - 4.5 mg/dL 4.0   MAGNESIUM 1.6 - 2.3 mg/dL    ALBUMIN 3.4 - 5.0 g/dL 3.9   BUN 7 - 30 mg/dL 15   CREATININE 0.52 - 1.04 mg/dL 0.98   PARATHYROID HORMONE INTACT 12 - 72 pg/mL    ALKPHOS 40 - 150 U/L 99   25 OH VIT D2 ug/L    25 OH VIT D3 ug/L    25 OH VIT D TOTAL 20 - 75 ug/L    VITAMIN D DEFICIENCY SCREENING 20 - 75 ug/L    PROTEIN, TOTAL 6.8 - 8.8 g/dL 7.2   CALCIUM URINE G/24 H 0.10 - 0.30 g/24 h      [facility] Latest Ref Rng & Units 2/23/2018   CALCIUM 8.5 - 10.1 mg/dL 8.4 (L)   CALCIUM IONIZED 4.4 - 5.2 mg/dL    CALCIUM IONIZED WHOLE BLOOD 4.4 - 5.2 mg/dL    PHOSPHOROUS 2.5 - 4.5 mg/dL    MAGNESIUM 1.6 - 2.3 mg/dL    ALBUMIN 3.4 - 5.0 g/dL    BUN 7 - 30 mg/dL 12   CREATININE 0.52 - 1.04 mg/dL 0.78   PARATHYROID HORMONE INTACT 12 - 72 pg/mL    ALKPHOS 40 - 150 U/L    25 OH VIT D2 ug/L    25 OH VIT D3 ug/L    25 OH VIT D TOTAL 20 - 75  ug/L    VITAMIN D DEFICIENCY SCREENING 20 - 75 ug/L    PROTEIN, TOTAL 6.8 - 8.8 g/dL    CALCIUM URINE G/24 H 0.10 - 0.30 g/24 h      ENDO CALCIUM LABS[facility] Latest Ref Rng & Units 2/16/2018   CALCIUM 8.5 - 10.1 mg/dL 8.4 (L)   CALCIUM IONIZED 4.4 - 5.2 mg/dL    CALCIUM IONIZED WHOLE BLOOD 4.4 - 5.2 mg/dL    PHOSPHOROUS 2.5 - 4.5 mg/dL    MAGNESIUM 1.6 - 2.3 mg/dL    ALBUMIN 3.4 - 5.0 g/dL 3.0 (L)   BUN 7 - 30 mg/dL 12   CREATININE 0.52 - 1.04 mg/dL 0.86   PARATHYROID HORMONE INTACT 12 - 72 pg/mL    ALKPHOS 40 - 150 U/L 70   25 OH VIT D2 ug/L    25 OH VIT D3 ug/L    25 OH VIT D TOTAL 20 - 75 ug/L    VITAMIN D DEFICIENCY SCREENING 20 - 75 ug/L    PROTEIN, TOTAL 6.8 - 8.8 g/dL 5.8 (L)   CALCIUM URINE G/24 H 0.10 - 0.30 g/24 h        EXAMINATION: US HEAD NECK SOFT TISSUE, 11/27/2018 12:17 PM      COMPARISON: Ultrasound soft tissue neck 11/2/2017 and CT 9/7/2018.     HISTORY: Papillary carcinoma, follicular variant. Additional history  of lymphoma.     FINDINGS: No soft tissue was seen in the thyroid bed.     Lymph nodes are measured bilaterally with measurements given in  transverse, AP, and craniocaudal dimensions as follows:     Right:     Level 2: Benign-appearing oval lymph node with preserved fatty hilum  measuring 1.1 x 0.5 x 1.3 cm, with no internal vascularity.  Level 3: No enlarged lymph node.  Level 4: No enlarged lymph node.  Level 5: No enlarged lymph node.  Level 6: No enlarged lymph node.     Left:  Level 2: No enlarged lymph node.  Level 3: No enlarged lymph node.  Level 4: No enlarged lymph node.  Level 5: No enlarged lymph node.  Level 6: No enlarged lymph node.         IMPRESSION: No pathologically enlarged or morphologically suspicious  lymph nodes in the neck.     I have personally reviewed the examination and initial interpretation  and I agree with the findings.     WENCESLAO YOO MD    Again, thank you for allowing me to participate in the care of your patient.      Sincerely,    Avelina CLEMENT  MD Gloria

## 2018-11-26 NOTE — PROGRESS NOTES
November 26, 2018    REASON FOR VISIT:  F/u breast cancer and thyroid cancer     HISTORY OF PRESENT ILLNESS:  Tisha Arias is a 58 year-old woman, postmenopausal, with a history of T1c N0 M0, infiltrating ductal carcinoma of the right breast with a strong family history of breast cancer as well as thyroid cancer and a history of DLBCL. BRCA1 and 2 gene testing negative.    Cancer Treatment History for her breast cancer and DLBCL:  -She was diagnosed with breast cancer in 12/2011.    -She underwent bilateral mastectomy with immediate reconstruction on 01/30/2012 by Dr. Daugherty.  Her final pathology revealed 1.6-cm, infiltrating ductal carcinoma, grade 1/3, no angiolymphatic space invasion, no nipple or skin invasion.  There was associated DCIS, and the margins were free of tumor superiorly, anterior margin by 0.1 cm, and widely free at remaining margins.  This was ER/SD-positive, HER2-negative in the vast majority of cells and non-amplified with a ratio of 1.1.  She had an Oncotype test performed, which revealed a low-risk score of 11.  That put her overall risk of recurrence at about 7% after 5 years of tamoxifen.    - in 02/2012, she was started on Arimidex.    - 12/2012, she had prophylactic hysterectomy and oophorectomy, the pathology of which was benign.    - Her course has been complicated by extreme chest wall pain, myofascial pain, and neuropathic pain.  She has gone through several different clinics and was put on several different medications, which were not doing her any good.  Eventually she was weaned off all the medications and is currently doing only PT with myofascial pain maneuvers with symptomatic improvement.   - 2/2014 switched from arimidex to tamoxifen  - presented to the ER on 9/1/17 for chest pain. CT-PA was negative for PE but showed right 3cm paraspinal mass and subcarinal adenopathy. PET on 9/6/17 showed the paraspinal mass to be hypermetabolic along with hilar and mediastinal  "nodes.   - biopsy of T10 vertebral body on 9/15/17 showed high-grade B cell lymphoma   - FNA EUS of subcarinal LN on 9/14 showed mostly necrotic tissue with suspicion for malignancy   - pt received DA-EPOCH under the care of Dr Garima Sauceda    Thyroid cancer history: Incidentally, given that she was having so many symptoms, she underwent a PET scan in 4/2013, which revealed a thyroid nodule which was biopsied and was consistent with papillary carcinoma.  She has undergone resection as well as radioiodine treatment since and has done relatively well from the surgery.  She follows up with Dr. Castro for the same.  She unfortunately required etoh ablation therapy over the thyroid lymph nodes.   She remains under the care of Dr Avelina Castro.       INTERVAL HISTORY: In returning today, Elvin comes in today for follow up.      She is complaining of severe chest pain.  She is using lidocaine cream topically.  She is taking oxycodone 30 mg every four hours, and MS Contin 30 mg q 12 hours.  She is also taking celebrex.    Since reducing the dose of oxycodone, she is having severe pain.  She has been written up twice at work.  She feels like her pain is too great to interact with her family.  She is very frustrated with her pain control    She is getting multiple geovanny horses per day in calves and feet.      She feels like her neuropathy in her feet has gotten worse.  Her feet feel like \"they are in a bunch of sand.\"  They will not bend or work the way she wants to move them.    She feels like she can no longer work full time.  She is inquiring about fullterm disability.      Her 10-point review of systems is otherwise negative.     MEDICATIONS:  reviewed     PHYSICAL EXAMINATION:    VITAL SIGNS:/81 (BP Location: Left arm, Patient Position: Chair, Cuff Size: Adult Regular)  Pulse 120  Temp 98.6  F (37  C) (Oral)  Resp 16  Ht 1.651 m (5' 5\")  Wt 80.3 kg (177 lb 1.6 oz)  SpO2 96%  BMI 29.47 " kg/m2  Vitals in chart and reviewed  GENERAL:  She is well-appearing, appears fatigued   HEENT:  Exam reveals no icterus or pallor.  Oropharynx is clear with no ulcers or lesions.    NECK:  Supple.  No supraclavicular or cervical lymphadenopathy.  She has a scar from thyroid surgery.     HEART:  Regular rate and rhythm.  S1, S2 clear.  No murmur, gallop or rub.    LUNGS:  Clear to auscultation bilaterally.    ABDOMEN:  Soft, nontender, nondistended, no organomegaly.    BREAST EXAM:    No supraclavicular or axillary adenopathy appreciated.  Chest wall with reproducible tenderness on palpation.  No nodules or masses.    MUSC: tenderness in midspine with associated paraspinal tenderness, no other reproducible tenderness  EXTREMITIES:  She has no lower extremity edema. , ambulating without difficulty  SKIN: no rash.    Labs  Lab Results   Component Value Date    WBC 3.9 09/04/2018     Lab Results   Component Value Date    RBC 4.18 09/04/2018     Lab Results   Component Value Date    HGB 13.6 09/04/2018     Lab Results   Component Value Date    HCT 40.0 09/04/2018     No components found for: MCT  Lab Results   Component Value Date    MCV 96 09/04/2018     Lab Results   Component Value Date    MCH 32.5 09/04/2018     Lab Results   Component Value Date    MCHC 34.0 09/04/2018     Lab Results   Component Value Date    RDW 11.7 09/04/2018     Lab Results   Component Value Date     09/04/2018       ASSESSMENT AND PLAN:      DLBCL: dx Sept 2017 with thoracic paravertebral soft tissue mass and mediastinal nodes. overexpression of c-myc and high mitotic index (double-hit like). Staging LP and BM bx negative. S/p DA-R-EPOCH.  She is continuing to see Dr Garima Sauceda for mgmt of her lymphoma.  I asked Dr Sauceda to arrange f/u.       Stage I, ER/MT-positive, HER2-negative breast cancer: dx in 2011. Oncotype recurrence score of 11 (7% recurrence risk after 5 years of tamoxifen). S/p bilateral mastectomies and  BENJIE/BSO. For endocrine therapy she was on arimidex from 4394-3031, then changed to tamoxifen b/c of arthralgias on Arimidex.  We discussed continuing her tamoxifen.  Refill provided. She will remain on this for 10 years.       Papillary thyroid cancer, it is being followed by Dr. Avelina Castro.  She underwent etoh ablation x3.        Synchronous thyroid cancer and breast cancer.  She is brca 1 and 2 negative.  Additional testing is negative.       Pain control - I discussed with Elvin today that I understand she has significant chest wall pain.  I reviewed with her that as a result of her complex pain control as well as the multiple different medications she has been on in the past that I believe it is best if her pain management is with one provider.  She has currently been working with Dr. Edu Benitez and is on MS Contin and oxycodone.  She is seeing Dr. Benitez later this week.  I will discuss her care with him.      We discussed there is a new pain specialist that she could be seeing if she is inquiring about a new referral.  Today she is very frustrated with Dr. Benitez and feels like the weaning of her oxycodone has been extremely difficult for her.      She is inquiring about full-term disability.  I will discuss this with Dr. Benitez.      From a breast cancer perspective, she has no signs or symptoms of recurrence.  I suggested we see her back in 6 months.        Shahla Crawley MD

## 2018-11-26 NOTE — TELEPHONE ENCOUNTER
Patient left voicemail on triage phone, only able to make out name of patient. Called back, unable to reach patient. Left voicemail with request for call back and let her know that we only know that she called, not what the request was.    Emma Aquino RN

## 2018-11-27 ENCOUNTER — RADIANT APPOINTMENT (OUTPATIENT)
Dept: ULTRASOUND IMAGING | Facility: CLINIC | Age: 58
End: 2018-11-27
Payer: COMMERCIAL

## 2018-11-27 DIAGNOSIS — C83.38 DIFFUSE LARGE B-CELL LYMPHOMA OF LYMPH NODES OF MULTIPLE REGIONS (H): Primary | ICD-10-CM

## 2018-11-27 DIAGNOSIS — E89.0 POSTSURGICAL HYPOTHYROIDISM: ICD-10-CM

## 2018-11-27 DIAGNOSIS — C73 PAPILLARY CARCINOMA, FOLLICULAR VARIANT (H): ICD-10-CM

## 2018-11-27 DIAGNOSIS — E89.2 POSTSURGICAL HYPOPARATHYROIDISM (H): ICD-10-CM

## 2018-11-27 DIAGNOSIS — E83.51 HYPOCALCEMIA: ICD-10-CM

## 2018-11-28 ENCOUNTER — TELEPHONE (OUTPATIENT)
Dept: ADDICTION MEDICINE | Facility: CLINIC | Age: 58
End: 2018-11-28

## 2018-11-28 ENCOUNTER — APPOINTMENT (OUTPATIENT)
Dept: LAB | Facility: CLINIC | Age: 58
End: 2018-11-28
Attending: INTERNAL MEDICINE
Payer: COMMERCIAL

## 2018-11-28 ENCOUNTER — TELEPHONE (OUTPATIENT)
Dept: PALLIATIVE CARE | Facility: CLINIC | Age: 58
End: 2018-11-28

## 2018-11-28 ENCOUNTER — OFFICE VISIT (OUTPATIENT)
Dept: PALLIATIVE CARE | Facility: CLINIC | Age: 58
End: 2018-11-28
Attending: INTERNAL MEDICINE
Payer: COMMERCIAL

## 2018-11-28 ENCOUNTER — MYC MEDICAL ADVICE (OUTPATIENT)
Dept: ONCOLOGY | Facility: CLINIC | Age: 58
End: 2018-11-28

## 2018-11-28 VITALS
RESPIRATION RATE: 18 BRPM | WEIGHT: 177 LBS | SYSTOLIC BLOOD PRESSURE: 120 MMHG | HEART RATE: 97 BPM | OXYGEN SATURATION: 96 % | BODY MASS INDEX: 29.45 KG/M2 | DIASTOLIC BLOOD PRESSURE: 72 MMHG | TEMPERATURE: 98.8 F

## 2018-11-28 DIAGNOSIS — Z79.891 ENCOUNTER FOR LONG-TERM OPIATE ANALGESIC USE: ICD-10-CM

## 2018-11-28 DIAGNOSIS — D05.10 DUCTAL CARCINOMA IN SITU (DCIS) OF BREAST, UNSPECIFIED LATERALITY: ICD-10-CM

## 2018-11-28 DIAGNOSIS — G89.28 OTHER CHRONIC POSTPROCEDURAL PAIN: ICD-10-CM

## 2018-11-28 DIAGNOSIS — R07.89 CHEST WALL PAIN: ICD-10-CM

## 2018-11-28 DIAGNOSIS — C73 PAPILLARY CARCINOMA, FOLLICULAR VARIANT (H): ICD-10-CM

## 2018-11-28 DIAGNOSIS — G89.3 NEOPLASM RELATED PAIN: ICD-10-CM

## 2018-11-28 DIAGNOSIS — Z85.3 PERSONAL HISTORY OF MALIGNANT NEOPLASM OF BREAST: ICD-10-CM

## 2018-11-28 LAB
ALBUMIN SERPL-MCNC: 3.4 G/DL (ref 3.4–5)
ALP SERPL-CCNC: 82 U/L (ref 40–150)
ALT SERPL W P-5'-P-CCNC: 28 U/L (ref 0–50)
ANION GAP SERPL CALCULATED.3IONS-SCNC: 8 MMOL/L (ref 3–14)
AST SERPL W P-5'-P-CCNC: 18 U/L (ref 0–45)
BASOPHILS # BLD AUTO: 0 10E9/L (ref 0–0.2)
BASOPHILS NFR BLD AUTO: 0.6 %
BILIRUB SERPL-MCNC: 0.5 MG/DL (ref 0.2–1.3)
BUN SERPL-MCNC: 18 MG/DL (ref 7–30)
CALCIUM SERPL-MCNC: 7.4 MG/DL (ref 8.5–10.1)
CHLORIDE SERPL-SCNC: 106 MMOL/L (ref 94–109)
CO2 SERPL-SCNC: 28 MMOL/L (ref 20–32)
CREAT SERPL-MCNC: 0.84 MG/DL (ref 0.52–1.04)
DIFFERENTIAL METHOD BLD: ABNORMAL
EOSINOPHIL # BLD AUTO: 0.1 10E9/L (ref 0–0.7)
EOSINOPHIL NFR BLD AUTO: 2.2 %
ERYTHROCYTE [DISTWIDTH] IN BLOOD BY AUTOMATED COUNT: 11.5 % (ref 10–15)
GFR SERPL CREATININE-BSD FRML MDRD: 69 ML/MIN/1.7M2
GLUCOSE SERPL-MCNC: 155 MG/DL (ref 70–99)
HCT VFR BLD AUTO: 40 % (ref 35–47)
HGB BLD-MCNC: 13.2 G/DL (ref 11.7–15.7)
IGA SERPL-MCNC: 97 MG/DL (ref 70–380)
IGG SERPL-MCNC: 532 MG/DL (ref 695–1620)
IGM SERPL-MCNC: 17 MG/DL (ref 60–265)
IMM GRANULOCYTES # BLD: 0 10E9/L (ref 0–0.4)
IMM GRANULOCYTES NFR BLD: 0 %
LDH SERPL L TO P-CCNC: 165 U/L (ref 81–234)
LYMPHOCYTES # BLD AUTO: 0.6 10E9/L (ref 0.8–5.3)
LYMPHOCYTES NFR BLD AUTO: 17.4 %
MCH RBC QN AUTO: 32.8 PG (ref 26.5–33)
MCHC RBC AUTO-ENTMCNC: 33 G/DL (ref 31.5–36.5)
MCV RBC AUTO: 99 FL (ref 78–100)
MONOCYTES # BLD AUTO: 0.3 10E9/L (ref 0–1.3)
MONOCYTES NFR BLD AUTO: 8.5 %
NEUTROPHILS # BLD AUTO: 2.3 10E9/L (ref 1.6–8.3)
NEUTROPHILS NFR BLD AUTO: 71.3 %
NRBC # BLD AUTO: 0 10*3/UL
NRBC BLD AUTO-RTO: 0 /100
PLATELET # BLD AUTO: 139 10E9/L (ref 150–450)
POTASSIUM SERPL-SCNC: 4 MMOL/L (ref 3.4–5.3)
PROT SERPL-MCNC: 6.2 G/DL (ref 6.8–8.8)
RBC # BLD AUTO: 4.03 10E12/L (ref 3.8–5.2)
SODIUM SERPL-SCNC: 142 MMOL/L (ref 133–144)
URATE SERPL-MCNC: 6.6 MG/DL (ref 2.6–6)
WBC # BLD AUTO: 3.2 10E9/L (ref 4–11)

## 2018-11-28 PROCEDURE — 36591 DRAW BLOOD OFF VENOUS DEVICE: CPT

## 2018-11-28 PROCEDURE — 82784 ASSAY IGA/IGD/IGG/IGM EACH: CPT | Performed by: INTERNAL MEDICINE

## 2018-11-28 PROCEDURE — 25000128 H RX IP 250 OP 636: Mod: ZF | Performed by: INTERNAL MEDICINE

## 2018-11-28 PROCEDURE — 83615 LACTATE (LD) (LDH) ENZYME: CPT | Performed by: INTERNAL MEDICINE

## 2018-11-28 PROCEDURE — 99214 OFFICE O/P EST MOD 30 MIN: CPT | Performed by: INTERNAL MEDICINE

## 2018-11-28 PROCEDURE — 84550 ASSAY OF BLOOD/URIC ACID: CPT | Performed by: INTERNAL MEDICINE

## 2018-11-28 PROCEDURE — 80053 COMPREHEN METABOLIC PANEL: CPT | Performed by: INTERNAL MEDICINE

## 2018-11-28 PROCEDURE — G0463 HOSPITAL OUTPT CLINIC VISIT: HCPCS | Mod: ZF

## 2018-11-28 PROCEDURE — 85025 COMPLETE CBC W/AUTO DIFF WBC: CPT | Performed by: INTERNAL MEDICINE

## 2018-11-28 RX ORDER — HEPARIN SODIUM (PORCINE) LOCK FLUSH IV SOLN 100 UNIT/ML 100 UNIT/ML
5 SOLUTION INTRAVENOUS EVERY 8 HOURS
Status: DISCONTINUED | OUTPATIENT
Start: 2018-11-28 | End: 2018-11-28 | Stop reason: HOSPADM

## 2018-11-28 RX ORDER — MORPHINE SULFATE 30 MG/1
30 TABLET, FILM COATED, EXTENDED RELEASE ORAL EVERY 8 HOURS
Qty: 120 TABLET | Refills: 0 | Status: SHIPPED | OUTPATIENT
Start: 2018-11-28 | End: 2018-12-20

## 2018-11-28 RX ORDER — MAGNESIUM 200 MG
200 TABLET ORAL 3 TIMES DAILY
COMMUNITY
Start: 2018-10-31 | End: 2019-11-15

## 2018-11-28 RX ORDER — OXYCODONE HYDROCHLORIDE 30 MG/1
30 TABLET ORAL EVERY 4 HOURS PRN
Qty: 132 TABLET | Refills: 0 | Status: SHIPPED | OUTPATIENT
Start: 2018-11-28 | End: 2018-12-20

## 2018-11-28 RX ADMIN — SODIUM CHLORIDE, PRESERVATIVE FREE 5 ML: 5 INJECTION INTRAVENOUS at 06:44

## 2018-11-28 ASSESSMENT — PAIN SCALES - GENERAL: PAINLEVEL: EXTREME PAIN (8)

## 2018-11-28 NOTE — PROGRESS NOTES
Palliative Care Outpatient Clinic    (This note was transcribed using voice recognition software. While I review and edit the transcription, I may miss errors, and the software sometimes does unexpected capitalizations and formatting that I miss. Please let me know of any serious mistranscriptions and I will addend this note.)    Patient ID:  Patient ID: she has long-standing chronic complex myofascial chest wall pain after breast cancer surgery and reconstruction. S/p Rachael en Y GB also.   Saw Dr England (at my insistence) earlier in 2016 for a 2nd opinion - he recommended modest opioid increases which we did and it only seemed to improve her situation temporarily  Mood disturbance in this context   Follows closely also with acupuncture, massage, & psychotherapy.   Is diagnosed also with MCAS and was followed by Dr Avendaño when he was here.  Thyroid cancer s/p resection, ablations.  Sept 2017 - she developed escalating sternal and back pain and had a CT in ED, then PET showing R 10th rib,T10 mass, mediastinal and hilar lymphadenopathy -->  High grade B cell lymphoma. Feb 2018 in complete radiographic remission. Opioids increased in that time to treat tumor related pain. She did not report improvement of her pain at that time.   She is a nurse, currently doing occupational health nursing at [employer] Outpatient Pall[facility] Opioid Prescribing Safety Plan     Opioid Safety Education: Reviewed by Edu Benitez on 8/29/18  Opioid Risk Assessment: Performed by Edu Benitez on 8/29/18  ORT score of 1 but I consider higher risk due to long-term high dose use, episodes of appearing sedated in clinic.   Mood Disorder Assessment: Performed by Edu Benitez on 8/29/18   reviewed with every prescription      Prognosis >1 year  Risk: Medium (ORT4-7)  Indication for Opioid Use: Moderate   Baseline and Annual UDT last performed on: 8/29/18  Naloxone Script provided if patient also on  "benzodiazepine: 8/29/18   Indications for Tapering reviewed: ongoing    History:  She is seen alone today.    Notably she has another appointment at 8:00 and says our appointment can only be about 20 minutes unfortunately.  She reports that globally things are going terribly for her.  Her pain is excruciating, both her long-standing chest wall and breast pain as well as her neuropathy pain in her feet.  I have been tapering her on her oxycodone by about 10% a month, and she reports this is going poorly.  She reports she is irritable all the time and in constant pain.  She says she has resigned from her job and is seeking disability.    In this context she is very unhappy with me directly and expresses that repeatedly.  She suggests I do not believe that she is in pain nor do I care that she has, and that I only care about tapering her off the medications in order to \"be politically correct \".  She also suggests wishing that I had the amount of pain that she was in so I would understand her, and suggest that I should not be getting sleep at night due to the cruel actions I am undertaking.     I do attempt to step back reframe her entire situation to her but it does not really go anywhere.  I note with Tisha that prior to her lymphoma diagnosis she was on substantially lower doses of her opioids and her pain, while significant, was overall adequately controlled such that she could work and be functional.  She then developed pain in her back and ribs from her lymphoma, her opioid doses were escalated, her lymphoma was treated successfully and she is in remission, however her pain remained worse than it ever had been, despite substantial increases in the opioid doses and regression of her tumors.  This situation, while unfortunate, happens sometimes, from hyperalgesia and I very strongly think that her pain will actually be better if she tapers down on the opioid substantially, to doses in the range of where she " was at prior to the lymphoma diagnosis, if not totally off the opioids.    She is due for opioid refills in a day or 2, she notes that she is out of the oxycodone as she used more than prescribed due to her pain she reports.    SH: Resigned from her job.  Otherwise unchanged.    ROS - 5 system ROS o/w neg    PE: /72 (BP Location: Left arm, Patient Position: Sitting, Cuff Size: Adult Regular)  Pulse 97  Temp 98.8  F (37.1  C) (Oral)  Resp 18  SpO2 96%   Wt Readings from Last 3 Encounters:   11/26/18 80.3 kg (177 lb 0.5 oz)   11/26/18 80.3 kg (177 lb 1.6 oz)   09/04/18 85.3 kg (188 lb)     Alert healthy appearing middle-aged woman in no acute distress.  Affect is full, irritable, tearful congruent times, with a clear sensorium fluent speech grossly normal memory and thought processes.  Comfortable appearing.    Data reviewed:  I reviewed recent labs and imaging, my comments:  Cr 1.11  CT Sept  Impression:   In this patient with a history of diffuse large B-cell lymphoma status  post chemotherapy, breast cancer status post bilateral mastectomy, and  thyroid cancer status post thyroidectomy:  1. Surgical changes as above without convincing evidence of recurrent  or metastatic disease in the chest abdomen and pelvis.  2. Unchanged intrahepatic and extrahepatic biliary ductal dilation,  presumably reservoir effect status post cholecystectomy.  3. Increased size of nonobstructing right and left renal stones.    Impression & Recommendations:  58-year-old woman with complex chronic pain including chest wall myofascial pain after mastectomy and reconstruction, as well as neuropathy.  On high-dose opioid therapy, and despite that is in terrible pain, difficulty with tapering, and escalating intense focus on maintaining her current level of opioid use, despite that.      I talked with her today that I am worried she has an iatrogenic opioid use disorder and that I would recommend that she be seen by a colleague in the  addiction clinic to be evaluated for this as well as to consider therapy with buprenorphine.  I am currently becoming certified in order to prescribe buprenorphine for opioid use disorder but realistically it is at least 6 months away that I will be able to do that.      As described above, I tried to reframe her situation and let her know that I think her pain can be a lot better and that she will overall be a lot better off on lower doses of opioids if not off of them entirely. I believe she is in pain; I also believe her pain and life will be better on markedly lower opioid doses. Especially in the context of her not at all believing that she will be better off on lower doses of opioids, I worry that me tapering her over long-term this is going to be a horrible, excruciating, bitter process for her and do wonder whether she would be better off with a more rapid transition to Suboxone, however I do not really know for sure of course, I would appreciate any input and help her from my addiction medicine colleagues.  Again, she completely does not seem to believe me about that, nor do I think there is much more any of a trusting therapeutic relationship between us anymore and I greatly regret that.  She did agree with the addiction referral at least reluctantly and I am putting that in today.  She needed to leave after me being in the room for about 20 minutes due to another appointment elsewhere. I know her oncologist discussed her mood/anxiety/coping with her at her recent visit.     Chart data reviewed today:    Family History   Problem Relation Age of Onset     Allergies Mother      Arthritis Mother      Cancer Mother      uterine/uterine     Hypertension Mother      on HCTZ     Hyperlipidemia Mother      Cerebrovascular Disease Mother      s/p brain surgery     Breast Cancer Mother      Depression Mother      Anxiety Disorder Mother      Anesthesia Reaction Mother      Asthma Mother      Skin Cancer Mother       Heart Disease Father      Diabetes Father      Coronary Artery Disease Father      Hypertension Father      Hyperlipidemia Father      Cerebrovascular Disease Father      Multiple strokes     Obesity Father      Diabetes Brother      Depression Brother      Hypertension Brother      Cancer Maternal Grandfather      pancreatic cancer/stomach cancer     Prostate Cancer Maternal Grandfather      Prostrate and Bladder     Other Cancer Maternal Grandfather      Pancreatic 1988, Bladder 1977     Cancer Maternal Grandmother      uterine     Breast Cancer Maternal Grandmother      Skin Cancer Maternal Grandmother      Cancer Brother 57     pancreatic cancer     Diabetes Brother      Breast Cancer Cousin      Grand mothers sister     Other Cancer Brother      Stage 3 Pancreatic 2-2015     Depression Brother      Asthma Brother      Obesity Brother      Anesthesia Reaction Other      H/a, itching, drug interactions     Asthma Other      Cancer Brother      Pancreatic      Thyroid Disease No family hx of      Past Medical History:   Diagnosis Date     Allergic rhinitis due to animal dander      Asthma      Breast cancer (H) 1/30/12    right     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 No real need for Epipen for this alone.     GDM (gestational diabetes mellitus)     w first pregnancy only     Gestational hypertension      Lymphedema     jackson arms, chest     Migraine      Neuropathy associated with cancer (H)      Papillary carcinoma, follicular variant (H) 6/28/2013    pT3, N1, Mx, thyroid     Post-surgical hypothyroidism      Renal disease     kidney stones     Rhinitis, allergic to other allergen      Right Breast mass and microcalcifications 12/13/2011     Seasonal allergic conjunctivitis      Seasonal allergic rhinitis 4/12/11 skin tests  pos. for: dog/M/T/G/W--NEGATIVE FOOD TESTS FOR: shrimp, crab, lobster, coconut     Past Surgical History:   Procedure Laterality Date     BACK SURGERY  ,    Bulging disc w nerve root impigment     BIOPSY BREAST      right     BIOPSY BREAST  11    right, core and sterotactic     BONE MARROW BIOPSY, BONE SPECIMEN, NEEDLE/TROCAR Left 10/3/2017    Procedure: BIOPSY BONE MARROW;  Bone Marrow Biopsy with aspirate;  Surgeon: Amelia Watkins PA-C;  Location: [facility] OR     BYPASS GASTRIC, CHOLECYSTECTOMY, COMBINED       C LAPAROSCOPIC GASTRIC RESTRICTIVE PX, W/GASTRIC BYPASS/ GAMALIEL-EN-Y, < 150CM      Gamaliel en Y?      SECTION       COLONOSCOPY      normal     COSMETIC SURGERY  2012    Final Stage of Mastectomy     DAVINCI HYSTERECTOMY TOTAL, BILATERAL SALPINGO-OOPHORECTOMY, COMBINED  2012    Procedure: COMBINED DAVINCI HYSTERECTOMY TOTAL, SALPINGO-OOPHORECTOMY;  Davinci Total Laparoscopic Hysterectomy, Bilateral Salpingo Oophorectomy, Pelvic Washings, Cystoscopy;  Surgeon: Evie Sheikh MD;  Location: [facility] OR     ENT SURGERY       ESOPHAGOSCOPY, GASTROSCOPY, DUODENOSCOPY (EGD), COMBINED N/A 2017    Procedure: COMBINED ENDOSCOPIC ULTRASOUND, ESOPHAGOSCOPY, GASTROSCOPY, DUODENOSCOPY (EGD), FINE NEEDLE ASPIRATE/BIOPSY;  Esophagogastroduodenoscopy, Endoscopic Ultrasound, with fine needle biopsy aspirate;  Surgeon: Patrick Robles MD;  Location: [facility] OR     GI SURGERY  2007    Gamaliel-en Y w cholecystectomy     GYN SURGERY  89,1/10/92    2 c-sections     HYSTERECTOMY, PAP NO LONGER INDICATED      DeVinci assister lap hyst with BSO     INSERT PORT VASCULAR ACCESS Right 10/4/2017    Procedure: INSERT PORT VASCULAR ACCESS;  Port Placement;  Surgeon: Jc Goodman PA-C;  Location: [facility]     IRRIGATION AND DEBRIDEMENT BREAST  3/1/2012    Procedure:IRRIGATION AND DEBRIDEMENT BREAST; Irrigation and Debridement, Wound Closure Right Breast;  Surgeon:JUNG GUILLEN; Location:[facility] OR     MASTECTOMY, RECONSTRUCT BREAST, COMBINED  1/30/2012    Procedure:COMBINED MASTECTOMY, RECONSTRUCT BREAST; Bilateral Mastectomies, Right Axillary [place] Node Biopsy, Bilateral Breast Reconstruction with Tissue Expanders, Reconstruction of inframammary fold, bilateral pain management systems; Surgeon:ANUPAM CAMPBELL; Location:[facility] OR     RECONSTRUCT BREAST  8/31/2012    Procedure: RECONSTRUCT BREAST;  Bilateral 2nd stage breast reconstruction, revision,       SOFT TISSUE SURGERY  1-30-12    Mastectomy w severe myofascial pain syndrome     THYROIDECTOMY  7/10/2013    Procedure: THYROIDECTOMY;  Total Thyroidectomy with central neck dissection;  Surgeon: Indiana Sneed MD;  Location: [facility] OR     TONSILLECTOMY      childhood     Allergies   Allergen Reactions     Baclofen Other (See Comments) and Unknown     Other reaction(s): Edema, chest pain, seizures.      Duloxetine Anaphylaxis and Other (See Comments)     Flushing, tremor/muscle twitching and edema  Serotonin syndrome     No Clinical Screening - See Comments Shortness Of Breath, Palpitations, Anaphylaxis, Itching, Swelling, Difficulty breathing and Rash     Sukhjinder wipes- oral allergy -  July 2015: throat tightness from a Chinese herbal medicine Wilmer Barry Tran     Serotonin Anxiety, Other (See Comments) and Swelling     Seizures      Tree Nuts [Nuts] Shortness Of Breath     Amitriptyline Hcl Swelling     Birch Trees      Potatoes, carrots, cherries, celery, apple, pears, plums, peaches, parsnip, kiwi, hazelnuts, and apricots,      Blue Dyes Itching     Headaches       Cymbalta Other (See Comments)     Flushing, tremor/muscle twitching and edema     Gabapentin Other (See Comments)     edema  Systemic edema, weaned off from Feb to March per Dr. Dowd.    edema     Grass      Mugwort [Artemisia Vulgaris]      Various spices     Ragweeds      Melons, bananas, cucumbers, zucchini.     Topamax      Nortriptyline  Itching, Visual Disturbance, Swelling, GI Disturbance, Anxiety, Other (See Comments) and Nausea     Other reaction(s): Swelling     Medications: I have reviewed the patient's medication profile. MNPMP: reviewed    Thank you for involving us in the patient's care.  A bunch of labs which were not ordered by me were attached to this clinic visit and I am leaving those diagnoses on the visit given they are associated with those labs although not a focus of our visit (eg papillar carcinoma). 20' face time today over half counseling.   Edu Benitez MD / Palliative Medicine / Pager [phone] / [facility] Inpatient Team Consult Pager [phone] (answered 8am-430pm M-F) - ok to text page via [employer] / After-Hours Answering Service [phone] / Palliative Clinic in the [facility] at the [facility] - [phone] (scheduling); [phone] (triage).

## 2018-11-28 NOTE — TELEPHONE ENCOUNTER
Patient left VM that her meds were not being filled and that she needs a new compound cream as the one she has is not effective. She also requested the disability paperwork that she provided to oncology earlier this week be returned to her.    Spoke with MD and pharmacy - gave okay to pharmacy to release pain meds to patient today (2 days early). Requested pharmacy call to let her know when these scripts were approved and able to be picked up/arrange delivery.     Spoke with RNCC with oncology - she mailed disability paperwork back to patient's home address and sent her a [employer] message advising this was done.    MD provided new Rx for a different compound cream, this was dropped at [facility], they will assist with this getting to compounding pharmacy for patient.    --  Called and left VM on patient's mobile phone advising of the above.

## 2018-11-28 NOTE — MR AVS SNAPSHOT
After Visit Summary   11/28/2018    Tisha Arias    MRN: [id number]           Patient Information     Date Of Birth          1960        Visit Information        Provider Department      11/28/2018 7:00 AM Edu Benitez MD [facility]        Today's Diagnoses     Chest wall pain        Ductal carcinoma in situ (DCIS) of breast, unspecified laterality        Papillary carcinoma, follicular variant (H)        Personal history of malignant neoplasm of breast        Neoplasm related pain        Other chronic postprocedural pain        Encounter for long-term opiate analgesic use           Follow-ups after your visit        Additional Services     ADDICTION MEDICINE REFERRAL       The Addiction Medicine Service is prepared to provide consultation for and, if necessary, ongoing care for patients with the disease of addiction, ages 16 and up.     For acute detox, please send your patient to the ER or contact [facility] at [phone].     Common problems that may warrant referral to the Addiction Medicine Service are:  Alcohol use disorder - diagnosis, treatment referral, acute and protracted withdrawal management, and ongoing medication assisted treatment including Antabuse and Naltrexone.  Opioid Use Disorder - medication assisted treatment including Buprenorphine (Suboxone) or extended release Naltrexone (Vivitrol)  Benzodiazepine dependence - extended outpatient detoxification  Many other issues pertaining to addiction, relapse, and recovery    Please contact our scheduling staff at [phone] with any questions.    Referrals to the Addiction Medicine Service assume that the referring provider has discussed the referral with the patient.  Referral will be reviewed and if appropriate, the patient will be contacted to schedule appointment.  If not appropriate, the provider will be contacted with further information.    Please answer the following  questions so we can better service your patient:    Drug of choice: opioids  Do they have a [facility] PCP:  Yes     Please bring the following to your appointment:  >>   List of current medications   >>   Any relevant history                  Your next 10 appointments already scheduled     Dec 06, 2018  7:40 AM CST   (Arrive by 7:25 AM)   New Patient Visit with Farzana Mccray MD   [facility] ([facility])    [address] [place] [address] [address]  [address]  [facility] [id number] [phone]            May 13, 2019  4:30 PM CDT   (Arrive by 4:15 PM)   Return Visit with Shahla Crawley MD   [facility] ([facility])    [address]  [address]  [facility] [address] [phone]              Future tests that were ordered for you today     Open Future Orders        Priority Expected Expires Ordered    CT Chest abdomen pelvis w & w/o contrast Routine 3/27/2019 11/27/2019 11/27/2018            Who to contact     If you have questions or need follow up information about today's clinic visit or your schedule please contact [facility] directly at [phone].  Normal or non-critical lab and imaging results will be communicated to you by MyChart, letter or phone within 4 business days after the clinic has received the results. If you do not hear from us within 7 days, please contact the clinic through MyChart or phone. If you have a critical or abnormal lab result, we will notify you by phone as soon as possible.  Submit refill requests through [employer] or call your pharmacy and they will forward the refill request to us. Please allow 3 business days for your refill to be completed.          Additional Information About Your Visit        [employer] Information     [employer] gives you secure access to your electronic health record. If you see a primary care provider, you can also send messages to your care team and make  appointments. If you have questions, please call your primary care clinic.  If you do not have a primary care provider, please call [phone] and they will assist you.        Care EveryWhere ID     This is your Care EveryWhere ID. This could be used by other organizations to access your [facility] medical records  [id number]        Your Vitals Were     Pulse Temperature Respirations Pulse Oximetry BMI (Body Mass Index)       97 98.8  F (37.1  C) (Oral) 18 96% 29.45 kg/m2        Blood Pressure from Last 3 Encounters:   11/28/18 120/72   11/26/18 129/81   11/26/18 129/81    Weight from Last 3 Encounters:   11/28/18 80.3 kg (177 lb)   11/26/18 80.3 kg (177 lb 0.5 oz)   11/26/18 80.3 kg (177 lb 1.6 oz)              We Performed the Following     *CBC with platelets differential     ADDICTION MEDICINE REFERRAL     Comprehensive metabolic panel     IgA     IgG     IgM     Lactate Dehydrogenase     Uric acid          Today's Medication Changes          These changes are accurate as of 11/28/18  8:51 AM.  If you have any questions, ask your nurse or doctor.               These medicines have changed or have updated prescriptions.        Dose/Directions    cyclobenzaprine 10 MG tablet   Commonly known as:  FLEXERIL   This may have changed:  when to take this   Used for:  High grade B-cell lymphoma (H)        Dose:  10 mg   Take 1 tablet (10 mg) by mouth 3 times daily as needed   Quantity:  30 tablet   Refills:  0            Where to get your medicines      Some of these will need a paper prescription and others can be bought over the counter.  Ask your nurse if you have questions.     Bring a paper prescription for each of these medications     morphine 30 MG CR tablet    oxyCODONE IR 30 MG tablet               Information about OPIOIDS     PRESCRIPTION OPIOIDS: WHAT YOU NEED TO KNOW   We gave you an opioid (narcotic) pain medicine. It is important to manage your pain, but opioids are not always the best choice. You  should first try all the other options your care team gave you. Take this medicine for as short a time (and as few doses) as possible.    Some activities can increase your pain, such as bandage changes or therapy sessions. It may help to take your pain medicine 30 to 60 minutes before these activities. Reduce your stress by getting enough sleep, working on hobbies you enjoy and practicing relaxation or meditation. Talk to your care team about ways to manage your pain beyond prescription opioids.    These medicines have risks:    DO NOT drive when on new or higher doses of pain medicine. These medicines can affect your alertness and reaction times, and you could be arrested for driving under the influence (DUI). If you need to use opioids long-term, talk to your care team about driving.    DO NOT operate heavy machinery    DO NOT do any other dangerous activities while taking these medicines.    DO NOT drink any alcohol while taking these medicines.     If the opioid prescribed includes acetaminophen, DO NOT take with any other medicines that contain acetaminophen. Read all labels carefully. Look for the word  acetaminophen  or  Tylenol.  Ask your pharmacist if you have questions or are unsure.    You can get addicted to pain medicines, especially if you have a history of addiction (chemical, alcohol or substance dependence). Talk to your care team about ways to reduce this risk.    All opioids tend to cause constipation. Drink plenty of water and eat foods that have a lot of fiber, such as fruits, vegetables, prune juice, apple juice and high-fiber cereal. Take a laxative (Miralax, milk of magnesia, Colace, Senna) if you don t move your bowels at least every other day. Other side effects include upset stomach, sleepiness, dizziness, throwing up, tolerance (needing more of the medicine to have the same effect), physical dependence and slowed breathing.    Store your pills in a secure place, locked if possible. We  will not replace any lost or stolen medicine. If you don t finish your medicine, please throw away (dispose) as directed by your pharmacist. The [place] Pollution Control Agency has more information about safe disposal: [email or web address]-medications         Primary Care Provider Office Phone # Fax #    Pablo Soto [phone] [address]  [facility] [address]        Equal Access to Services     RODRIGO ZAMORA : Hadii aad ku hadasho Soomaali, waaxda luqadaha, qaybta kaalmada adeegyada, waxay idiin hayaan adeeg kharash latrey . So [facility] [phone].    ATENCIÓN: Si habla español, tiene a stiles disposición servicios gratuitos de asistencia lingüística. [facility] [phone].    We comply with applicable federal civil rights laws and [place] laws. We do not discriminate on the basis of race, color, national origin, age, disability, sex, sexual orientation, or gender identity.            Thank you!     Thank you for choosing [facility]  for your care. Our goal is always to provide you with excellent care. Hearing back from our patients is one way we can continue to improve our services. Please take a few minutes to complete the written survey that you may receive in the mail after your visit with us. Thank you!             Your Updated Medication List - Protect others around you: Learn how to safely use, store and throw away your medicines at www.disposemymeds.org.          This list is accurate as of 11/28/18  8:51 AM.  Always use your most recent med list.                   Brand Name Dispense Instructions for use Diagnosis    ACAI RAMIREZ PO      Take 50 mg by mouth        acetaminophen 325 MG tablet    TYLENOL    100 tablet    Take 2 tablets (650 mg) by mouth every 4 hours as needed for mild pain or fever    High grade B-cell lymphoma (H)       acyclovir 400 MG tablet    ZOVIRAX    30 tablet    Take 1 tablet  (400 mg) by mouth 2 times daily    High grade B-cell lymphoma (H)       aluminum chloride 20 % external solution    DRYSOL    60 mL    Apply topically At Bedtime    Rash, Intertrigo       azithromycin 250 MG tablet    ZITHROMAX    6 tablet    Two tablets first day, then one tablet daily for four days.    Cat scratch of forearm, unspecified laterality, initial encounter       AZMACORT IN      Inhale 2 puffs into the lungs as needed        bisacodyl 5 MG EC tablet     30 tablet    Take 3 tablets (15 mg) by mouth daily as needed for constipation    Constipation, unspecified constipation type       calcitRIOL 0.25 MCG capsule    ROCALTROL    90 capsule    TAKE 2 CAPSULES BY MOUTH EVERY MORNING AND TAKE 1 CAPSULE BY MOUTH EVERY NIGHT AT BEDTIME    Postsurgical hypothyroidism, Papillary carcinoma, follicular variant (H), Metastasis to cervical lymph node (H)       CALCIUM CITRATE + D3 315-250 MG-UNIT Tabs per tablet   Generic drug:  calcium citrate-vitamin D      Take 2 tablets by mouth 2 times daily        celecoxib 200 MG capsule    celeBREX    60 capsule    Take 1 capsule (200 mg) by mouth 2 times daily    Chest wall pain       cetirizine 10 MG tablet    ZYRTEC ALLERGY    90 tablet    Take 1 tablet (10 mg) by mouth 3 times daily On hold for lab test.    High grade B-cell lymphoma (H)       COMPOUND CONTAINING CONTROLLED SUBSTANCE - PHARMACY TO MIX COMPOUNDED MEDICATION    CMPD RX    120 g    Ketamine 6% + lidocaine 10% in Lipo. Apply small amount to affected area two times daily.    Neoplasm related pain       cromolyn 4 % ophthalmic solution    OPTICROM    10 mL    Place 1 drop into both eyes 4 times daily    Idiopathic mast cell activation syndrome (H)       cromolyn sodium 5.2 MG/ACT nasal aerosol    NASALCROM    1 Bottle    SPRAY ONE SPRAY( 1 ML) IN NOSTRIL DAILY    Mast cell disease, systemic       [employer] GUM[employer]ILDR[employer] Chew     120 tablet    Take 5 g by mouth daily (2 gummy= 5 g =1 serving)    High grade  B-cell lymphoma (H)       cyclobenzaprine 10 MG tablet    FLEXERIL    30 tablet    Take 1 tablet (10 mg) by mouth 3 times daily as needed    High grade B-cell lymphoma (H)       diazepam 5 MG tablet    VALIUM    90 tablet    TAKE ONE TABLET (5 MG) BY MOUTH THREE TIMES DAILY AS NEEDED FOR MUSCLE SPASMS    Chest wall pain       diclofenac 1 % topical gel    VOLTAREN    100 g    Apply affected area two times daily PRN using enclosed dosing card.    Myofascial pain       EPINEPHrine 0.3 MG/0.3ML injection 2-pack    EPIPEN 2-CARIDAD    0.6 mL    Inject 0.3 mLs (0.3 mg) into the muscle once as needed for anaphylaxis    Anaphylaxis, subsequent encounter       fluticasone 50 MCG/ACT nasal spray    FLONASE    1 Bottle    Spray 1-2 sprays into both nostrils daily    Bacterial sinusitis       furosemide 20 MG tablet    LASIX    90 tablet    Take 1 tablet (20 mg) by mouth 2 times daily    Localized edema, Chest wall pain, Ductal carcinoma in situ (DCIS) of breast, unspecified laterality, Papillary carcinoma, follicular variant (H), Personal history of malignant neoplasm of breast       levofloxacin 750 MG tablet    LEVAQUIN    10 tablet    Take 1 tablet (750 mg) by mouth daily    Bacterial sinusitis       levothyroxine 112 MCG tablet    SYNTHROID/LEVOTHROID    189 tablet    ON MONDAY THRU SATURDAY TAKE TWO TABLETS DAILY AND ON SUNDAY TAKE 3 TABLETS DAILY.    Postsurgical hypothyroidism, Papillary carcinoma, follicular variant (H), Postsurgical hypoparathyroidism (H), Thyroid cancer (H)       lidocaine 5 % external ointment    XYLOCAINE    350 g    Apply quarter size amount to chest and back up to 3 times daily as needed for pain.    Chest wall pain, Localized edema, Ductal carcinoma in situ (DCIS) of breast, unspecified laterality, Papillary carcinoma, follicular variant (H), Personal history of malignant neoplasm of breast       lidocaine visc 2% 2.5mL/5mL & maalox/mylanta w/ simeth 2.5mL/5mL & diphenhydrAMINE 5mg/5mL Susp  suspension    [facility] Mouthwash [facility]    360 mL    Swish and spit 10 mLs in mouth every 6 hours as needed for mouth sores    Stomatitis and mucositis, High grade B-cell lymphoma (H)       * lidocaine-prilocaine 2.5-2.5 % external cream    EMLA    30 g    Apply topically as needed (port access pain.)    Neoplasm related pain, Chest wall pain       * lidocaine-prilocaine 2.5-2.5 % external cream    EMLA    60 g    Apply topically as needed for moderate pain    High grade B-cell lymphoma (H)       magic mouthwash suspension (diphenhydrAMINE, lidocaine, aluminum-magnesium & simethicone) compounding kit     237 mL    Swish and spit 5-10 mLs in mouth every 6 hours as needed for mouth sores    Stomatitis and mucositis, High grade B-cell lymphoma (H)       magnesium 200 MG Tabs      Take 200 mg by mouth 3 times daily        methocarbamol 750 MG tablet    ROBAXIN    120 tablet    Take 1 tablet (750 mg) by mouth 4 times daily . Ok to take a 5th Robaxin on very severe days.    Myofascial pain       montelukast 10 MG tablet    SINGULAIR    120 tablet    Take 2 tablets (20 mg) by mouth 2 times daily    Idiopathic mast cell activation syndrome (H)       morphine 30 MG CR tablet    MS CONTIN    120 tablet    Take 1 tablet (30 mg) by mouth every 8 hours . Take an addition pill at night such that your evening dose is 60mg    Neoplasm related pain       naloxone 4 MG/0.1ML nasal spray    NARCAN    0.2 mL    Spray 1 spray (4 mg) into one nostril alternating nostrils as needed for opioid reversal every 2-3 minutes until assistance arrives    Encounter for long-term opiate analgesic use       olopatadine 0.1 % ophthalmic solution    PATANOL     Apply 1 drop to eye        * ondansetron 8 MG ODT tab    ZOFRAN-ODT    30 tablet    DISSOLVE ONE TABLET IN MOUTH EVERY EIGHT HOURS AS NEEDED    High grade B-cell lymphoma (H), Nausea and vomiting, intractability of vomiting not specified, unspecified vomiting type, Breast cancer (H)       *  ondansetron 8 MG ODT tab    ZOFRAN-ODT    30 tablet    Take 1 tablet (8 mg) by mouth every 8 hours as needed    High grade B-cell lymphoma (H), Nausea and vomiting, intractability of vomiting not specified, unspecified vomiting type       * order for DME     1 Units    Compression stockings, medium grade, please measure and fit. Please dispense a pair.    Peripheral edema       * order for DME     2 Units    Equipment being ordered: compression stockings. 20-30 mm or 30 - 40 mm as patient can tolerate. Physical therapy to determine if they should be above or below the knee.    Venous stasis       * order for DME     2 Device    Equipment being ordered: compression bra    Malignant neoplasm of right female breast (H)       order for DME     1 Units    Compression socks - knee high 20-30 mmHg zippered if possible    Lower extremity edema       oxyCODONE IR 30 MG tablet    ROXICODONE    132 tablet    Take 1 tablet (30 mg) by mouth every 4 hours as needed for moderate to severe pain . This is a 30day supply    Other chronic postprocedural pain, Encounter for long-term opiate analgesic use       polyethylene glycol powder    MIRALAX    510 g    Take 17 g (1 capful) by mouth daily    Acute constipation       potassium chloride ER 20 MEQ CR tablet    KLOR-CON    60 tablet    Take 1 tablet (20 mEq) by mouth 2 times daily    Hypokalemia       PROBIOTIC DAILY PO      Take 1 capsule by mouth daily Lacto acid bifidobacterium    Breast cancer, unspecified laterality, Thyroid cancer (H), Chronic arthralgias of knees and hips, unspecified laterality       prochlorperazine 10 MG tablet    COMPAZINE     Take 10 mg by mouth as needed        ranitidine 75 MG tablet    ZANTAC    90 tablet    Take 1 tablet (75 mg) by mouth 3 times daily    Mast cell disease, systemic       senna-docusate 8.6-50 MG tablet    SENOKOT-S/PERICOLACE    60 tablet    Take 1-2 tablets by mouth 2 times daily as needed for constipation    Acute constipation        SUMAtriptan 50 MG tablet    IMITREX    30 tablet    Take 1 tablet (50 mg) by mouth as needed    Intractable persistent migraine aura without cerebral infarction and without status migrainosus       tamoxifen 20 MG tablet    NOLVADEX    90 tablet    Take 1 tablet (20 mg) by mouth daily    Ductal carcinoma in situ (DCIS) of breast, unspecified laterality, Localized edema, Chest wall pain, Papillary carcinoma, follicular variant (H), Personal history of malignant neoplasm of breast       triamcinolone 0.025 % external ointment    KENALOG     Apply topically as needed        TUMS ULTRA 1000 1000 MG Chew   Generic drug:  calcium carbonate antacid      Take 1 tablet (1,000 mg) by mouth 4 times daily as needed (severe muscle cramps)    Hypocalcemia       UNABLE TO FIND      3 tablets 3 times daily MEDICATION NAME: calcium D-Glucarate  3 caps contain 180mg of elemental calcium.        UNABLE TO FIND      Take 2 capsules by mouth 3 times daily Muscle Mag. 2 caps contain B1 20mg, B2 20mg, B6 10mg, magesium 20mg, manganese 2mg.        UNABLE TO FIND      2 tablets 3 times daily MEDICATION NAME: Digestzymes    Thyroid cancer (H), Postsurgical hypothyroidism, Postsurgical hypoparathyroidism (H)       UNABLE TO FIND      1 tablet daily MEDICATION NAME: Pure Encapsulations    Thyroid cancer (H), Postsurgical hypothyroidism, Postsurgical hypoparathyroidism (H)       VENTOLIN  (90 Base) MCG/ACT inhaler   Generic drug:  albuterol     18 g    INHALE 2 PUFFS INTO THE LUNGS EVERY 4 HOURS AS NEEDED FOR SHORTNESS OF BREATH OR DIFFICULT BREATHING OR WHEEZING    Mild intermittent asthma without complication       vitamin D3 2000 units Caps      Take 5,000 Units by mouth daily    Thyroid cancer (H), Postsurgical hypothyroidism, Papillary carcinoma, follicular variant (H), Postsurgical hypoparathyroidism (H)       * Notice:  This list has 7 medication(s) that are the same as other medications prescribed for you. Read the directions  carefully, and ask your doctor or other care provider to review them with you.

## 2018-11-28 NOTE — LETTER
11/28/2018       RE: Tisha Arias  [address] [place] [address]     Dear Colleague,    Thank you for referring your patient, Tisha Arias, to the [facility] at [facility]. Please see a copy of my visit note below.    Palliative Care Outpatient Clinic    (This note was transcribed using voice recognition software. While I review and edit the transcription, I may miss errors, and the software sometimes does unexpected capitalizations and formatting that I miss. Please let me know of any serious mistranscriptions and I will addend this note.)    Patient ID:  Patient ID: she has long-standing chronic complex myofascial chest wall pain after breast cancer surgery and reconstruction. S/p Rachael en Y GB also.   Saw Dr England (at my insistence) earlier in 2016 for a 2nd opinion - he recommended modest opioid increases which we did and it only seemed to improve her situation temporarily  Mood disturbance in this context   Follows closely also with acupuncture, massage, & psychotherapy.   Is diagnosed also with MCAS and was followed by Dr Avendaño when he was here.  Thyroid cancer s/p resection, ablations.  Sept 2017 - she developed escalating sternal and back pain and had a CT in ED, then PET showing R 10th rib,T10 mass, mediastinal and hilar lymphadenopathy -->  High grade B cell lymphoma. Feb 2018 in complete radiographic remission. Opioids increased in that time to treat tumor related pain. She did not report improvement of her pain at that time.   She is a nurse, currently doing occupational health nursing at [employer] Outpatient Palliative Care Opioid Prescribing Safety Plan     Opioid Safety Education: Reviewed by Edu Benitez on 8/29/18  Opioid Risk Assessment: Performed by Edu Benitez on 8/29/18  ORT score of 1 but I consider higher risk due to long-term high dose use, episodes of appearing sedated in clinic.  "  Mood Disorder Assessment: Performed by Edu Benitez on 8/29/18   reviewed with every prescription      Prognosis >1 year  Risk: Medium (ORT4-7)  Indication for Opioid Use: Moderate   Baseline and Annual UDT last performed on: 8/29/18  Naloxone Script provided if patient also on benzodiazepine: 8/29/18   Indications for Tapering reviewed: ongoing    History:  She is seen alone today.    Notably she has another appointment at 8:00 and says our appointment can only be about 20 minutes unfortunately.  She reports that globally things are going terribly for her.  Her pain is excruciating, both her long-standing chest wall and breast pain as well as her neuropathy pain in her feet.  I have been tapering her on her oxycodone by about 10% a month, and she reports this is going poorly.  She reports she is irritable all the time and in constant pain.  She says she has resigned from her job and is seeking disability.    In this context she is very unhappy with me directly and expresses that repeatedly.  She suggests I do not believe that she is in pain nor do I care that she has, and that I only care about tapering her off the medications in order to \"be politically correct \".  She also suggests wishing that I had the amount of pain that she was in so I would understand her, and suggest that I should not be getting sleep at night due to the cruel actions I am undertaking.     I do attempt to step back reframe her entire situation to her but it does not really go anywhere.  I note with Tisha that prior to her lymphoma diagnosis she was on substantially lower doses of her opioids and her pain, while significant, was overall adequately controlled such that she could work and be functional.  She then developed pain in her back and ribs from her lymphoma, her opioid doses were escalated, her lymphoma was treated successfully and she is in remission, however her pain remained worse than it ever had been, despite " substantial increases in the opioid doses and regression of her tumors.  This situation, while unfortunate, happens sometimes, from hyperalgesia and I very strongly think that her pain will actually be better if she tapers down on the opioid substantially, to doses in the range of where she was at prior to the lymphoma diagnosis, if not totally off the opioids.    She is due for opioid refills in a day or 2, she notes that she is out of the oxycodone as she used more than prescribed due to her pain she reports.    SH: Resigned from her job.  Otherwise unchanged.    ROS - 5 system ROS o/w neg    PE: /72 (BP Location: Left arm, Patient Position: Sitting, Cuff Size: Adult Regular)  Pulse 97  Temp 98.8  F (37.1  C) (Oral)  Resp 18  SpO2 96%   Wt Readings from Last 3 Encounters:   11/26/18 80.3 kg (177 lb 0.5 oz)   11/26/18 80.3 kg (177 lb 1.6 oz)   09/04/18 85.3 kg (188 lb)     Alert healthy appearing middle-aged woman in no acute distress.  Affect is full, irritable, tearful congruent times, with a clear sensorium fluent speech grossly normal memory and thought processes.  Comfortable appearing.    Data reviewed:  I reviewed recent labs and imaging, my comments:  Cr 1.11  CT Sept  Impression:   In this patient with a history of diffuse large B-cell lymphoma status  post chemotherapy, breast cancer status post bilateral mastectomy, and  thyroid cancer status post thyroidectomy:  1. Surgical changes as above without convincing evidence of recurrent  or metastatic disease in the chest abdomen and pelvis.  2. Unchanged intrahepatic and extrahepatic biliary ductal dilation,  presumably reservoir effect status post cholecystectomy.  3. Increased size of nonobstructing right and left renal stones.    Impression & Recommendations:  58-year-old woman with complex chronic pain including chest wall myofascial pain after mastectomy and reconstruction, as well as neuropathy.  On high-dose opioid therapy, and despite that  is in terrible pain, difficulty with tapering, and escalating intense focus on maintaining her current level of opioid use, despite that.      I talked with her today that I am worried she has an iatrogenic opioid use disorder and that I would recommend that she be seen by a colleague in the addiction clinic to be evaluated for this as well as to consider therapy with buprenorphine.  I am currently becoming certified in order to prescribe buprenorphine for opioid use disorder but realistically it is at least 6 months away that I will be able to do that.      As described above, I tried to reframe her situation and let her know that I think her pain can be a lot better and that she will overall be a lot better off on lower doses of opioids if not off of them entirely. I believe she is in pain; I also believe her pain and life will be better on markedly lower opioid doses. Especially in the context of her not at all believing that she will be better off on lower doses of opioids, I worry that me tapering her over long-term this is going to be a horrible, excruciating, bitter process for her and do wonder whether she would be better off with a more rapid transition to Suboxone, however I do not really know for sure of course, I would appreciate any input and help her from my addiction medicine colleagues.  Again, she completely does not seem to believe me about that, nor do I think there is much more any of a trusting therapeutic relationship between us anymore and I greatly regret that.  She did agree with the addiction referral at least reluctantly and I am putting that in today.  She needed to leave after me being in the room for about 20 minutes due to another appointment elsewhere. I know her oncologist discussed her mood/anxiety/coping with her at her recent visit.     Chart data reviewed today:    Family History   Problem Relation Age of Onset     Allergies Mother      Arthritis Mother      Cancer Mother       uterine/uterine     Hypertension Mother      on HCTZ     Hyperlipidemia Mother      Cerebrovascular Disease Mother      s/p brain surgery     Breast Cancer Mother      Depression Mother      Anxiety Disorder Mother      Anesthesia Reaction Mother      Asthma Mother      Skin Cancer Mother      Heart Disease Father      Diabetes Father      Coronary Artery Disease Father      Hypertension Father      Hyperlipidemia Father      Cerebrovascular Disease Father      Multiple strokes     Obesity Father      Diabetes Brother      Depression Brother      Hypertension Brother      Cancer Maternal Grandfather      pancreatic cancer/stomach cancer     Prostate Cancer Maternal Grandfather      Prostrate and Bladder     Other Cancer Maternal Grandfather      Pancreatic 1988, Bladder 1977     Cancer Maternal Grandmother      uterine     Breast Cancer Maternal Grandmother      Skin Cancer Maternal Grandmother      Cancer Brother 57     pancreatic cancer     Diabetes Brother      Breast Cancer Cousin      Grand mothers sister     Other Cancer Brother      Stage 3 Pancreatic 2-2015     Depression Brother      Asthma Brother      Obesity Brother      Anesthesia Reaction Other      H/a, itching, drug interactions     Asthma Other      Cancer Brother      Pancreatic      Thyroid Disease No family hx of      Past Medical History:   Diagnosis Date     Allergic rhinitis due to animal dander      Asthma      Breast cancer (H) 1/30/12    right     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 No real need for Epipen for this alone.     GDM (gestational diabetes mellitus)     w first pregnancy only     Gestational hypertension      Lymphedema     jackson arms, chest     Migraine      Neuropathy associated with cancer (H)      Papillary carcinoma, follicular variant  (H) 2013    pT3, N1, Mx, thyroid     Post-surgical hypothyroidism      Renal disease     kidney stones     Rhinitis, allergic to other allergen      Right Breast mass and microcalcifications 2011     Seasonal allergic conjunctivitis      Seasonal allergic rhinitis 11 skin tests pos. for: dog/M/T/G/W--NEGATIVE FOOD TESTS FOR: shrimp, crab, lobster, coconut     Past Surgical History:   Procedure Laterality Date     BACK SURGERY  ,    Bulging disc w nerve root impigment     BIOPSY BREAST      right     BIOPSY BREAST  11    right, core and sterotactic     BONE MARROW BIOPSY, BONE SPECIMEN, NEEDLE/TROCAR Left 10/3/2017    Procedure: BIOPSY BONE MARROW;  Bone Marrow Biopsy with aspirate;  Surgeon: Amelia Watkins PA-C;  Location: [facility] OR     BYPASS GASTRIC, CHOLECYSTECTOMY, COMBINED       C LAPAROSCOPIC GASTRIC RESTRICTIVE PX, W/GASTRIC BYPASS/ GAMALIEL-EN-Y, < 150CM      Gamaliel en Y?      SECTION       COLONOSCOPY      normal     COSMETIC SURGERY  2012    Final Stage of Mastectomy     DAVINCI HYSTERECTOMY TOTAL, BILATERAL SALPINGO-OOPHORECTOMY, COMBINED  2012    Procedure: COMBINED DAVINCI HYSTERECTOMY TOTAL, SALPINGO-OOPHORECTOMY;  Davinci Total Laparoscopic Hysterectomy, Bilateral Salpingo Oophorectomy, Pelvic Washings, Cystoscopy;  Surgeon: Evie Sheikh MD;  Location: [facility] OR     ENT SURGERY       ESOPHAGOSCOPY, GASTROSCOPY, DUODENOSCOPY (EGD), COMBINED N/A 2017    Procedure: COMBINED ENDOSCOPIC ULTRASOUND, ESOPHAGOSCOPY, GASTROSCOPY, DUODENOSCOPY (EGD), FINE NEEDLE ASPIRATE/BIOPSY;  Esophagogastroduodenoscopy, Endoscopic Ultrasound, with fine needle biopsy aspirate;  Surgeon: Patrick Robles MD;  Location: [facility] OR     GI SURGERY  2007    Gamaliel-en Y w cholecystectomy     GYN SURGERY  89,1/10/92    2 c-sections     HYSTERECTOMY, PAP NO LONGER INDICATED      DeVinci assister lap hyst with BSO     INSERT PORT VASCULAR ACCESS Right  10/4/2017    Procedure: INSERT PORT VASCULAR ACCESS;  Port Placement;  Surgeon: Jc Goodman PA-C;  Location: [facility] OR     IRRIGATION AND DEBRIDEMENT BREAST  3/1/2012    Procedure:IRRIGATION AND DEBRIDEMENT BREAST; Irrigation and Debridement, Wound Closure Right Breast; Surgeon:JUNG GUILLEN; Location:[facility] OR     MASTECTOMY, RECONSTRUCT BREAST, COMBINED  1/30/2012    Procedure:COMBINED MASTECTOMY, RECONSTRUCT BREAST; Bilateral Mastectomies, Right Axillary [place] Node Biopsy, Bilateral Breast Reconstruction with Tissue Expanders, Reconstruction of inframammary fold, bilateral pain management systems; Surgeon:ANUPAM CAMPBELL; Location:[facility] OR     RECONSTRUCT BREAST  8/31/2012    Procedure: RECONSTRUCT BREAST;  Bilateral 2nd stage breast reconstruction, revision,       SOFT TISSUE SURGERY  1-30-12    Mastectomy w severe myofascial pain syndrome     THYROIDECTOMY  7/10/2013    Procedure: THYROIDECTOMY;  Total Thyroidectomy with central neck dissection;  Surgeon: Indiana Sneed MD;  Location: [facility] OR     TONSILLECTOMY      childhood     Allergies   Allergen Reactions     Baclofen Other (See Comments) and Unknown     Other reaction(s): Edema, chest pain, seizures.      Duloxetine Anaphylaxis and Other (See Comments)     Flushing, tremor/muscle twitching and edema  Serotonin syndrome     No Clinical Screening - See Comments Shortness Of Breath, Palpitations, Anaphylaxis, Itching, Swelling, Difficulty breathing and Rash     Sukhjinder wipes- oral allergy -  July 2015: throat tightness from a Chinese herbal medicine Wilmer Tran     Serotonin Anxiety, Other (See Comments) and Swelling     Seizures      Tree Nuts [Nuts] Shortness Of Breath     Amitriptyline Hcl Swelling     Birch Trees      Potatoes, carrots, cherries, celery, apple, pears, plums, peaches, parsnip, kiwi, hazelnuts, and apricots,      Blue Dyes Itching     Headaches       Cymbalta Other (See Comments)     Flushing, tremor/muscle twitching  and edema     Gabapentin Other (See Comments)     edema  Systemic edema, weaned off from Feb to March per Dr. Dowd.    edema     Grass      Mugwort [Artemisia Vulgaris]      Various spices     Ragweeds      Melons, bananas, cucumbers, zucchini.     Topamax      Nortriptyline Itching, Visual Disturbance, Swelling, GI Disturbance, Anxiety, Other (See Comments) and Nausea     Other reaction(s): Swelling     Medications: I have reviewed the patient's medication profile. MNPMP: reviewed    Thank you for involving us in the patient's care.  A bunch of labs which were not ordered by me were attached to this clinic visit and I am leaving those diagnoses on the visit given they are associated with those labs although not a focus of our visit (eg papillar carcinoma). 20' face time today over half counseling.   Edu Benitez MD / Palliative Medicine / Pager [phone] / [facility] Inpatient Team Consult Pager [phone] (answered 8am-430pm M-F) - ok to text page via [employer] / After-Hours Answering Service [phone] / Palliative Clinic in the [facility] at the [facility] [place] - [phone] (scheduling); [phone] (triage).

## 2018-11-29 DIAGNOSIS — R07.89 CHEST WALL PAIN: ICD-10-CM

## 2018-11-29 NOTE — TELEPHONE ENCOUNTER
Please schedule appointment for patient with Addiction Medicine provider for possible Suboxone -Dr. Ma if possible

## 2018-11-29 NOTE — TELEPHONE ENCOUNTER
Writer spoke with the pt and pt stated that she is seeing a different provider on 12/6/18 and at this time is not interested in our clinic. Writer informed pt that her referral is good for one calendar year and she can reach out to us within that timeframe and we can get her scheduled for pain management w/ Suboxone for Dr. Ma. Routing to both Dr. Shell and referring provider. Then closing encounter.     Janice Martinez

## 2018-11-29 NOTE — TELEPHONE ENCOUNTER
Received message from pharmacy requesting refill of diazepam.     Last refill: 10/8/2018  Last office visit: 11/28/2018       Will route request to MD for review.     Reviewed [place]  Report.

## 2018-11-30 DIAGNOSIS — E89.0 POSTSURGICAL HYPOTHYROIDISM: ICD-10-CM

## 2018-11-30 DIAGNOSIS — E83.51 HYPOCALCEMIA: ICD-10-CM

## 2018-11-30 DIAGNOSIS — C73 PAPILLARY CARCINOMA, FOLLICULAR VARIANT (H): ICD-10-CM

## 2018-11-30 DIAGNOSIS — E89.2 POSTSURGICAL HYPOPARATHYROIDISM (H): ICD-10-CM

## 2018-11-30 PROCEDURE — 82340 ASSAY OF CALCIUM IN URINE: CPT

## 2018-11-30 PROCEDURE — 81050 URINALYSIS VOLUME MEASURE: CPT

## 2018-11-30 RX ORDER — DIAZEPAM 5 MG
TABLET ORAL
Qty: 90 TABLET | Refills: 1 | Status: SHIPPED | OUTPATIENT
Start: 2018-11-30 | End: 2019-01-16

## 2018-12-01 LAB
CALCIUM 24H UR-MRATE: 0.23 G/24 H (ref 0.1–0.3)
CALCIUM UR-MCNC: 10 MG/DL
CALCIUM/CREAT UR: 0.13 G/G CR
COLLECT DURATION TIME UR: NORMAL H
CREAT 24H UR-MRATE: 1.75 G/(24.H) (ref 0.8–1.8)
CREAT UR-MCNC: 76 MG/DL
SPECIMEN VOL UR: 2300 ML

## 2018-12-04 ENCOUNTER — CARE COORDINATION (OUTPATIENT)
Dept: ONCOLOGY | Facility: CLINIC | Age: 58
End: 2018-12-04

## 2018-12-04 ENCOUNTER — MYC MEDICAL ADVICE (OUTPATIENT)
Dept: ONCOLOGY | Facility: CLINIC | Age: 58
End: 2018-12-04

## 2018-12-04 ENCOUNTER — TELEPHONE (OUTPATIENT)
Dept: ONCOLOGY | Facility: CLINIC | Age: 58
End: 2018-12-04

## 2018-12-04 NOTE — TELEPHONE ENCOUNTER
I spoke to Elvin about her request for Dr. Crawley to fill out her disability paperwork. We discussed that Dr. Crawley had left her a message yesterday about it and would be happy to discuss it with her. I reviewed with her that it would be best for the paperwork to be filled out by a primary care provider or possibly the pain specialist she will be seeing this week. Elvin is upset, stating that in the past the clinic encouraged her to apply and that we have once again lied to her. She is expecting a call from Dr. Crawley about this.    Emma Aquino, MSN, RN, OCN  Patient Care Supervisor  [facility]

## 2018-12-04 NOTE — PROGRESS NOTES
Spoke to patient asking why her disability paper work was returned incomplete. Reviewed dictation and nurse notes that patient requested to have paper work mailed back for her PMD to complete.  Answered all patient's questions and verbalized understanding. Seema Chadwick RN, BSN.

## 2018-12-04 NOTE — TELEPHONE ENCOUNTER
Spoke to patient via telephone about paperwork.    Emma Aquino, MSN, RN, OCN  Patient Care Supervisor  [facility]

## 2018-12-06 ENCOUNTER — TELEPHONE (OUTPATIENT)
Dept: FAMILY MEDICINE | Facility: CLINIC | Age: 58
End: 2018-12-06

## 2018-12-06 ENCOUNTER — OFFICE VISIT (OUTPATIENT)
Dept: ANESTHESIOLOGY | Facility: CLINIC | Age: 58
End: 2018-12-06
Attending: ANESTHESIOLOGY
Payer: COMMERCIAL

## 2018-12-06 VITALS — DIASTOLIC BLOOD PRESSURE: 88 MMHG | HEART RATE: 74 BPM | OXYGEN SATURATION: 97 % | SYSTOLIC BLOOD PRESSURE: 158 MMHG

## 2018-12-06 DIAGNOSIS — G89.28 POST-MASTECTOMY PAIN SYNDROME: Primary | ICD-10-CM

## 2018-12-06 DIAGNOSIS — F11.90 OPIOID USE DISORDER: ICD-10-CM

## 2018-12-06 DIAGNOSIS — G89.4 CHRONIC PAIN SYNDROME: ICD-10-CM

## 2018-12-06 RX ORDER — METHOCARBAMOL 500 MG/1
1000 TABLET, FILM COATED ORAL 4 TIMES DAILY PRN
Qty: 90 TABLET | Refills: 1 | Status: SHIPPED | OUTPATIENT
Start: 2018-12-06 | End: 2018-12-19 | Stop reason: SINTOL

## 2018-12-06 ASSESSMENT — PAIN SCALES - GENERAL: PAINLEVEL: EXTREME PAIN (8)

## 2018-12-06 NOTE — PROGRESS NOTES
[facility] : Consultation Note    Patient: Tisha Arias Age: 58 year old   MRN: [id number] Referred by:  Misbah     Date of Visit: December 6, 2018    Reason for consultation:    Tisha Arias is a 58 year old female who is seen in consultation today at the request of her provider,Dr. Crawley for a comprehensive evaluation and management of pain.  Primary Care Provider is Pablo Soto.  Opiate pain medications are being prescribed by - Dr. Benitez.    Chief complaints:  Postmastectomy pain syndrome, myofascial chest wall pain    History of Present illness:     Tisha Arias is a 58 year old female with complex chronic pain history.  She had bilateral mastectomy and reconstruction for breast cancer.  After that she developed postmastectomy pain syndrome, which seems to be myofascial chest wall pain.  She has been managing  her symptoms with chronic opioid therapy prescribed by Dr. Benitez.  Over the years, she reported several other nonspecific symptoms including sternal pain and back pain.  The doses of opioids were increased at the time to treat tumor related pain however the patient did not report any improvement of her symptoms.  Therefore she was suggested to wean off of her opioid. However she reports that the weaning regimen makes her symptoms worse.    Her cancer history is extensive.  She was initially diagnosed  with a breast cancer on 12/2011.  Had bilateral mastectomy with immediate reconstruction on 01/30/2012 by Dr. Daugherty.  The same year she had prophylactic hysterectomy and nephrectomy.  In 2013, she underwent a PET scan in 2013 which revealed a thyroid nodule which was biopsied and consistent with a papillary carcinoma.  She has had undergone resection as well as radioiodine treatment. In 2017 she was diagnosed with  3 cm paraspinal mass and subcarinal adenopathy. The biopsy of the T10  vertebral body shows high-grade B-cell lymphoma.  She has been undergoing chemotherapy under the care of Dr. Sauceda.  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  She wants to apply for disability and has requested to fill out her disability paperwork.    [place] Prescription Monitoring Program:   Reviewed. 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 No real need for Epipen for this alone.     GDM (gestational diabetes mellitus)     w first pregnancy only     Gestational hypertension      Lymphedema     jackson arms, chest     Migraine      Neuropathy associated with cancer (H)      Papillary carcinoma, follicular variant (H) 6/28/2013    pT3, N1, Mx, thyroid     Post-surgical hypothyroidism      Renal disease     kidney stones     Rhinitis, allergic to other allergen      Right Breast mass and microcalcifications 12/13/2011     Seasonal allergic conjunctivitis      Seasonal allergic rhinitis 4/12/11 skin tests pos. for: dog/M/T/G/W--NEGATIVE FOOD TESTS FOR: shrimp, crab, lobster, coconut       Past Surgical History:  Past Surgical History:   Procedure Laterality Date     BACK SURGERY  2000,    Bulging disc w nerve root impigment     BIOPSY BREAST  1989    right     BIOPSY BREAST  12/13/11    right, core and sterotactic     BONE MARROW BIOPSY, BONE SPECIMEN, NEEDLE/TROCAR Left 10/3/2017    Procedure: BIOPSY BONE MARROW;  Bone Marrow Biopsy with aspirate;  Surgeon: Amelia Watkins PA-C;  Location: [facility] OR     BYPASS GASTRIC, CHOLECYSTECTOMY, COMBINED  2007     C LAPAROSCOPIC GASTRIC RESTRICTIVE PX,  W/GASTRIC BYPASS/ GAMALIEL-EN-Y, < 150CM      Gamaliel en Y?      SECTION       COLONOSCOPY      normal     COSMETIC SURGERY  2012    Final Stage of Mastectomy     DAVINCI HYSTERECTOMY TOTAL, BILATERAL SALPINGO-OOPHORECTOMY, COMBINED  2012    Procedure: COMBINED DAVINCI HYSTERECTOMY TOTAL, SALPINGO-OOPHORECTOMY;  Davinci Total Laparoscopic Hysterectomy, Bilateral Salpingo Oophorectomy, Pelvic Washings, Cystoscopy;  Surgeon: Evie Sheikh MD;  Location: [facility] OR     ENT SURGERY  1982     ESOPHAGOSCOPY, GASTROSCOPY, DUODENOSCOPY (EGD), COMBINED N/A 2017    Procedure: COMBINED ENDOSCOPIC ULTRASOUND, ESOPHAGOSCOPY, GASTROSCOPY, DUODENOSCOPY (EGD), FINE NEEDLE ASPIRATE/BIOPSY;  Esophagogastroduodenoscopy, Endoscopic Ultrasound, with fine needle biopsy aspirate;  Surgeon: Patrick Robles MD;  Location: [facility] OR     GI SURGERY  2007    Gamaliel-en Y w cholecystectomy     GYN SURGERY  89,1/10/92    2 c-sections     HYSTERECTOMY, PAP NO LONGER INDICATED      DeVinci assister lap hyst with BSO     INSERT PORT VASCULAR ACCESS Right 10/4/2017    Procedure: INSERT PORT VASCULAR ACCESS;  Port Placement;  Surgeon: Jc Goodman PA-C;  [facility]: [facility]     IRRIGATION AND DEBRIDEMENT BREAST  3/1/2012    Procedure:IRRIGATION AND DEBRIDEMENT BREAST; Irrigation and Debridement, Wound Closure Right Breast; Surgeon:JUNG GUILLEN; Location:[facility] OR     MASTECTOMY, RECONSTRUCT BREAST, COMBINED  2012    Procedure:COMBINED MASTECTOMY, RECONSTRUCT BREAST; Bilateral Mastectomies, Right Axillary Raymond Node Biopsy, Bilateral Breast Reconstruction with Tissue Expanders, Reconstruction of inframammary fold, bilateral pain management systems; Surgeon:ANUPAM CAMPBELL; Location:[facility] OR     RECONSTRUCT BREAST  2012    Procedure: RECONSTRUCT BREAST;  Bilateral 2nd stage breast reconstruction, revision,       SOFT TISSUE SURGERY  12    Mastectomy w severe myofascial pain syndrome      THYROIDECTOMY  7/10/2013    Procedure: THYROIDECTOMY;  Total Thyroidectomy with central neck dissection;  Surgeon: Indiana Sneed MD;  Location: [facility] OR     TONSILLECTOMY      childhood       Medications:  Current Outpatient Prescriptions   Medication Sig Dispense Refill     ACAI RAMIREZ PO Take 50 mg by mouth       acetaminophen (TYLENOL) 325 MG tablet Take 2 tablets (650 mg) by mouth every 4 hours as needed for mild pain or fever 100 tablet      acyclovir (ZOVIRAX) 400 MG tablet Take 1 tablet (400 mg) by mouth 2 times daily (Patient not taking: Reported on 10/11/2018) 30 tablet 0     aluminum chloride (DRYSOL) 20 % external solution Apply topically At Bedtime 60 mL 3     azithromycin (ZITHROMAX) 250 MG tablet Two tablets first day, then one tablet daily for four days. (Patient not taking: Reported on 10/11/2018) 6 tablet 0     bisacodyl (DULCOLAX) 5 MG EC tablet Take 3 tablets (15 mg) by mouth daily as needed for constipation 30 tablet 0     calcitRIOL (ROCALTROL) 0.25 MCG capsule TAKE 2 CAPSULES BY MOUTH EVERY MORNING AND TAKE 1 CAPSULE BY MOUTH EVERY NIGHT AT BEDTIME 90 capsule 11     calcium carbonate antacid (TUMS ULTRA 1000) 1000 MG CHEW Take 1 tablet (1,000 mg) by mouth 4 times daily as needed (severe muscle cramps)       calcium citrate-vitamin D (CALCIUM CITRATE + D3) 315-250 MG-UNIT TABS per tablet Take 2 tablets by mouth 2 times daily       celecoxib (CELEBREX) 200 MG capsule Take 1 capsule (200 mg) by mouth 2 times daily 60 capsule 2     cetirizine (ZYRTEC ALLERGY) 10 MG tablet Take 1 tablet (10 mg) by mouth 3 times daily On hold for lab test. 90 tablet 0     Cholecalciferol (VITAMIN D3) 2000 units CAPS Take 5,000 Units by mouth daily  0     COMPOUND CONTAINING CONTROLLED SUBSTANCE (CMPD RX) - PHARMACY TO MIX COMPOUNDED MEDICATION COMPOUNDED ANALGESIC CREAM AMITRIPTYLINE 2% KETAMINE 6% GABAPENTIN 5% CLONIDINE 0.2% LIDOCAINE 10% VANICREAM 1 QSAD  Apply small amount to affected area two times daily.  120 g 6     cromolyn (OPTICROM) 4 % ophthalmic solution Place 1 drop into both eyes 4 times daily 10 mL 0     cromolyn sodium (NASALCROM) 5.2 MG/ACT AERS Inhaler SPRAY ONE SPRAY( 1 ML) IN NOSTRIL DAILY 1 Bottle 6     [employer] FIBER GUMMY BEARS CHILDREN CHEW Take 5 g by mouth daily (2 gummy= 5 g =1 serving) 120 tablet 3     cyclobenzaprine (FLEXERIL) 10 MG tablet Take 1 tablet (10 mg) by mouth 3 times daily as needed (Patient taking differently: Take 10 mg by mouth 3 times daily ) 30 tablet 0     diazepam (VALIUM) 5 MG tablet TAKE ONE TABLET ( 5 MG) BY MOUTH THREE TIMES DAILY AS NEEDED FOR MUSCLE SPASMS. FILL 10/08/2018 90 tablet 1     diclofenac (VOLTAREN) 1 % GEL topical gel Apply affected area two times daily PRN using enclosed dosing card. 100 g 3     EPINEPHrine (EPIPEN 2-CARIDAD) 0.3 MG/0.3ML injection 2-pack Inject 0.3 mLs (0.3 mg) into the muscle once as needed for anaphylaxis (Patient not taking: Reported on 10/11/2018) 0.6 mL 3     fluticasone (FLONASE) 50 MCG/ACT spray Spray 1-2 sprays into both nostrils daily 1 Bottle 11     furosemide (LASIX) 20 MG tablet Take 1 tablet (20 mg) by mouth 2 times daily 90 tablet 3     levofloxacin (LEVAQUIN) 750 MG tablet Take 1 tablet (750 mg) by mouth daily 10 tablet 0     levothyroxine (SYNTHROID/LEVOTHROID) 112 MCG tablet ON MONDAY THRU SATURDAY TAKE TWO TABLETS DAILY AND ON SUNDAY TAKE 3 TABLETS DAILY. 189 tablet 3     lidocaine (XYLOCAINE) 5 % ointment Apply quarter size amount to chest and back up to 3 times daily as needed for pain. 350 g 1     lidocaine-prilocaine (EMLA) cream Apply topically as needed for moderate pain (Patient not taking: Reported on 11/26/2018) 60 g 1     lidocaine-prilocaine (EMLA) cream Apply topically as needed (port access pain.) 30 g 1     magic mouthwash (FIRST-MOUTHWASH BLM) suspension Swish and spit 5-10 mLs in mouth every 6 hours as needed for mouth sores 237 mL 1     magic mouthwash suspension (diphenhydrAMINE, lidocaine, aluminum-magnesium &  simethicone) Swish and spit 10 mLs in mouth every 6 hours as needed for mouth sores (Patient not taking: Reported on 10/11/2018) 360 mL 1     magnesium 200 MG TABS Take 200 mg by mouth 3 times daily       methocarbamol (ROBAXIN) 750 MG tablet Take 1 tablet (750 mg) by mouth 4 times daily . Ok to take a 5th Robaxin on very severe days. 120 tablet 3     montelukast (SINGULAIR) 10 MG tablet Take 2 tablets (20 mg) by mouth 2 times daily 120 tablet 11     morphine (MS CONTIN) 30 MG CR tablet Take 1 tablet (30 mg) by mouth every 8 hours . Take an addition pill at night such that your evening dose is 60mg 120 tablet 0     naloxone (NARCAN) nasal spray Spray 1 spray (4 mg) into one nostril alternating nostrils as needed for opioid reversal every 2-3 minutes until assistance arrives (Patient not taking: Reported on 10/11/2018) 0.2 mL 0     olopatadine (PATANOL) 0.1 % ophthalmic solution Apply 1 drop to eye       ondansetron (ZOFRAN-ODT) 8 MG ODT tab Take 1 tablet (8 mg) by mouth every 8 hours as needed (Patient not taking: Reported on 11/26/2018) 30 tablet 1     ondansetron (ZOFRAN-ODT) 8 MG ODT tab DISSOLVE ONE TABLET IN MOUTH EVERY EIGHT HOURS AS NEEDED  30 tablet 3     order for DME Compression socks - knee high 20-30 mmHg zippered if possible 1 Units 11     order for DME Equipment being ordered: compression stockings. 20-30 mm or 30 - 40 mm as patient can tolerate. Physical therapy to determine if they should be above or below the knee. 2 Units 4     order for DME Equipment being ordered: compression bra 2 Device 1     order for DME Compression stockings, medium grade, please measure and fit. Please dispense a pair. 1 Units 0     oxyCODONE IR (ROXICODONE) 30 MG tablet Take 1 tablet (30 mg) by mouth every 4 hours as needed for moderate to severe pain . This is a 30day supply 132 tablet 0     polyethylene glycol (MIRALAX) powder Take 17 g (1 capful) by mouth daily 510 g 0     potassium chloride SA (KLOR-CON) 20 MEQ CR  tablet Take 1 tablet (20 mEq) by mouth 2 times daily 60 tablet 3     Probiotic Product (PROBIOTIC DAILY PO) Take 1 capsule by mouth daily Lacto acid bifidobacterium       prochlorperazine (COMPAZINE) 10 MG tablet Take 10 mg by mouth as needed  3     ranitidine (ZANTAC) 75 MG tablet Take 1 tablet (75 mg) by mouth 3 times daily 90 tablet 0     senna-docusate (SENOKOT-S;PERICOLACE) 8.6-50 MG per tablet Take 1-2 tablets by mouth 2 times daily as needed for constipation (Patient not taking: Reported on 10/11/2018) 60 tablet 0     SUMAtriptan (IMITREX) 50 MG tablet Take 1 tablet (50 mg) by mouth as needed 30 tablet 3     tamoxifen (NOLVADEX) 20 MG tablet Take 1 tablet (20 mg) by mouth daily 90 tablet 3     triamcinolone (KENALOG) 0.025 % ointment Apply topically as needed  3     Triamcinolone Acetonide (AZMACORT IN) Inhale 2 puffs into the lungs as needed       UNABLE TO FIND Take 2 capsules by mouth 3 times daily Muscle Mag. 2 caps contain B1 20mg, B2 20mg, B6 10mg, magesium 20mg, manganese 2mg.       UNABLE TO FIND 3 tablets 3 times daily MEDICATION NAME: calcium D-Glucarate   3 caps contain 180mg of elemental calcium.       UNABLE TO FIND 2 tablets 3 times daily MEDICATION NAME: Digestzymes        UNABLE TO FIND 1 tablet daily MEDICATION NAME: Pure Encapsulations       VENTOLIN  (90 BASE) MCG/ACT Inhaler INHALE 2 PUFFS INTO THE LUNGS EVERY 4 HOURS AS NEEDED FOR SHORTNESS OF BREATH OR DIFFICULT BREATHING OR WHEEZING 18 g 3       Analgesic Medications:   Medications related to Pain Management     None           Allergies:       Allergies   Allergen Reactions     Baclofen Other (See Comments) and Unknown     Other reaction(s): Edema, chest pain, seizures.      Duloxetine Anaphylaxis and Other (See Comments)     Flushing, tremor/muscle twitching and edema  Serotonin syndrome     No Clinical Screening - See Comments Shortness Of Breath, Palpitations, Anaphylaxis, Itching, Swelling, Difficulty breathing and Rash      Sukhjinder wipes- oral allergy -  July 2015: throat tightness from a Chinese herbal medicine Wilmer Barry Tran     Serotonin Anxiety, Other (See Comments) and Swelling     Seizures      Tree Nuts [Nuts] Shortness Of Breath     Amitriptyline Hcl Swelling     Birch Trees      Potatoes, carrots, cherries, celery, apple, pears, plums, peaches, parsnip, kiwi, hazelnuts, and apricots,      Blue Dyes Itching     Headaches       Cymbalta Other (See Comments)     Flushing, tremor/muscle twitching and edema     Gabapentin Other (See Comments)     edema  Systemic edema, weaned off from Feb to March per Dr. Dowd.    edema     Grass      Mugwort [Artemisia Vulgaris]      Various spices     Ragweeds      Melons, bananas, cucumbers, zucchini.     Topamax      Nortriptyline Itching, Visual Disturbance, Swelling, GI Disturbance, Anxiety, Other (See Comments) and Nausea     Other reaction(s): Swelling       Social History:    Social History     Social History     Marital status:      Spouse name: N/A     Number of children: N/A     Years of education: N/A     Occupational History     Not on file.     Social History Main Topics     Smoking status: Never Smoker     Smokeless tobacco: Never Used      Comment: no 2nd hand smoke exposure     Alcohol use No      Comment: rare     Drug use: No     Sexual activity: Not Currently     Partners: Male     Birth control/ protection: Surgical      Comment: Tubal Ligation then hysterectomy     Other Topics Concern     Parent/Sibling W/ Cabg, Mi Or Angioplasty Before 65f 55m? Yes     Adrian Martinez     Social History Narrative     Social History     Social History Narrative         Family history:  Family History   Problem Relation Age of Onset     Allergies Mother      Arthritis Mother      Cancer Mother      uterine/uterine     Hypertension Mother      on HCTZ     Hyperlipidemia Mother      Cerebrovascular Disease Mother      s/p brain surgery     Breast Cancer Mother      Depression  Mother      Anxiety Disorder Mother      Anesthesia Reaction Mother      Asthma Mother      Skin Cancer Mother      Heart Disease Father      Diabetes Father      Coronary Artery Disease Father      Hypertension Father      Hyperlipidemia Father      Cerebrovascular Disease Father      Multiple strokes     Obesity Father      Diabetes Brother      Depression Brother      Hypertension Brother      Cancer Maternal Grandfather      pancreatic cancer/stomach cancer     Prostate Cancer Maternal Grandfather      Prostrate and Bladder     Other Cancer Maternal Grandfather      Pancreatic 1988, Bladder 1977     Cancer Maternal Grandmother      uterine     Breast Cancer Maternal Grandmother      Skin Cancer Maternal Grandmother      Cancer Brother 57     pancreatic cancer     Diabetes Brother      Breast Cancer Cousin      Grand mothers sister     Other Cancer Brother      Stage 3 Pancreatic 2-2015     Depression Brother      Asthma Brother      Obesity Brother      Anesthesia Reaction Other      H/a, itching, drug interactions     Asthma Other      Cancer Brother      Pancreatic      Thyroid Disease No family hx of          Physical Exam:  Vitals:    12/06/18 0742   BP: 158/88   Pulse: 74   SpO2: 97%       General: Awake in no apparent distress.   Eyes: Sclerae are anicteric. PERRLA, EOMI   Neck: supple, no masses.   Lungs: unlabored.   Heart: regular rate and rhythm   Abdomen: soft non tender.  Extremities: Pulses are well palpable, no peripheral edema.   Musculoskeletal: All muscle groups are normal in bulk and tone. The patient changes position without pain behavior. The patient walks with a normal gait. Posture is normal. Muscle strength was rated at 5/5 in all groups in the extremities. Examination of the joints reveals preserved range of motion.  The mastectomy scar on bilateral chest wall well-healed.  No hypo-or hyperesthesia noted near the mastectomy scars.  Neurologic exam: Sensation to light touch intact  throughout all dermatomes bilateral upper extremities and lower extremities  Psychiatric; Normal affect.   Skin: Warm and Dry.       LABORATORY VALUES:   Recent Labs   Lab Test  11/28/18   0649  08/31/18   1726   NA  142  140   POTASSIUM  4.0  3.9   CHLORIDE  106  102   CO2  28  33*   ANIONGAP  8  6   GLC  155*  108*   BUN  18  17   CR  0.84  1.11*   ELMO  7.4*  8.4*       CBC RESULTS:   Recent Labs   Lab Test  11/28/18   0649   WBC  3.2*   RBC  4.03   HGB  13.2   HCT  40.0   MCV  99   MCH  32.8   MCHC  33.0   RDW  11.5   PLT  139*       Most Recent 3 INR's:  Recent Labs   Lab Test  12/28/17   1224  10/31/17   0540  10/30/17   0535   INR  0.99  1.03  0.96       ASSESSMENT/PLAN:                             ASSESSMENT:    - Postmastectomy pain syndrome  - Myofascial muscle pain  - Chronic pain syndrome  - Opioid use disorder       PLAN:    1. Medications.   -Tylenol 1 g 3 times daily as needed  -Celebrex 200 mg p.o. twice daily  -Robaxin 1 g 4 times daily as needed  -Taper opioids per Dr. Benitez      2. Interventional procedures:    None at this time.     3. Labs and imaging: None needed for pain management.     4. Rehab: Refer to physical therapy. Advised to perform home exercise using [employer] videos  [email or web address].  The patient is also encouraged to stay active as tolerated.     5. Psychology: will consider pain psych in the furture     6. Integrated medicine: We discussed acupuncture therapy. We will refer the patient for acupuncture therapy.     7. Disposition: Follow-up as needed.    Assessment will be ongoing with changes in treatment as indicated.  Benefits/risks/alternatives to treatment have been reviewed and the patient has been instructed to contact this office if they have any questions or concerns.  This plan of care has been discussed with the patient and the patient is in agreement.     Farzana Mccray MD, PHD      TOTAL TIME: I spent 45 minutes including greater than 50%  face-to-face time counseling her about her diagnosis and treatment options.

## 2018-12-06 NOTE — MR AVS SNAPSHOT
After Visit Summary   12/6/2018    Tisha Arias    MRN: [id number]           Patient Information     Date Of Birth          1960        Visit Information        Provider Department      12/6/2018 7:40 AM Farzana Mccray MD [facility]        Today's Diagnoses     Post-mastectomy pain syndrome    -  1      Care Instructions    1. Continue with Tylenol 1,000  Mg-  Three times a day as needed.     2. Continue with Celebrex 200 mg- Two times a day.     3. Methocarbamol (Robaxin) increased to 1,000 mg- Use four times a day as needed for muscle spasms.    4. Continue with Opioid Taper per Dr. Benitez.     5. Physical Therapy Referral placed- If you have not heard from the scheduling office within 2 business days, please call [phone] for all locations    6. Acupuncture referral placed-   [facility] [phone]    -Please call your insurance provider to find out about acupuncture coverage, being that not all policies cover acupuncture services.    You can ask your insurance about coverage regarding these additional locations:     [facility]- Kat Greene [phone]    ([facility])- [facility] Lori mayes [phone]    [facility] [phone]  Jd Kwong   [address] [place] [address]    Follow up: As needed.       To speak with a nurse, schedule/reschedule/cancel a clinic appointment, or request a medication refill call: [phone]     You can also reach us by [employer]: [email or web address]    For refills, please call on Monday, 1 week before your medication runs out. The doctors are not always in clinic, so this gives us time to get your prescriptions ready.  Please let us know the name of the medication you are requesting a refill of.                                     Follow-ups after your visit        Additional Services     ACUPUNCTURE REFERRAL       Your  provider has referred you to: [facility]: [facility] [phone]    Please be aware that coverage of these services is subject to the terms and limitations of your health insurance plan.  Call member services at your health plan with any benefit or coverage questions.      Please bring the following with you to your appointment:    (1) Any X-Rays, CTs or MRIs which have been performed.  Contact the facility where they were done to arrange for  prior to your scheduled appointment.  (2) List of current medications   (3) This referral request   (4) Any documents/labs given to you for this referral            PHYSICAL THERAPY REFERRAL       If you have not heard from the scheduling office within 2 business days, please call [phone] for all locations, with the exception of [place], please call [phone] and [place], please call [phone].    Please be aware that coverage of these services is subject to the terms and limitations of your health insurance plan.  Call member services at your health plan with any benefit or coverage questions.                  Your next 10 appointments already scheduled     Mar 19, 2019  1:00 PM CDT   CT CHEST ABDOMEN PELVIS W/O & W CONTRAST with UCCT1   [facility] ([facility])    [address] [address] [phone]           How do I prepare for my exam? (Food and drink instructions) To prepare: Do not eat or drink for 2 hours before your exam. If you need to take medicine, you may take it with small sips of water. (We may ask you to take liquid medicine as well.)  How do I prepare for my exam? (Other instructions) Please arrive 30 minutes early for your CT.  Once in the department you might be asked to drink water 15-20 minutes prior to your exam.  If indicated you may be asked to drink an oral contrast in advance of your CT.  If this is the case, the imaging team will let you  know or be in contact with you prior to your appointment  Patients over 70 or patients with diabetes or kidney problems: If you haven t had a blood test (creatinine test) within the last 30 days, the Cardiologist/Radiologist may require you to get this test prior to your exam.  If you have diabetes:  Continue to take your metformin medication on the day of your exam  What should I wear: Please wear loose clothing, such as a sweat suit or jogging clothes. Avoid snaps, zippers and other metal. We may ask you to undress and put on a hospital gown.  How long does the exam take: Most scans take less than 20 minutes.  What should I bring: Please bring any scans or X-rays taken at other hospitals, if similar tests were done. Also bring a list of your medicines, including vitamins, minerals and over-the-counter drugs. It is safest to leave personal items at home.  Do I need a : No  is needed.  What do I need to tell my doctor? Be sure to tell your doctor: * If you have any allergies. * If there s any chance you are pregnant. * If you are breastfeeding.  What should I do after the exam: No restrictions, You may resume normal activities.  What is this test: A CT (computed tomography) scan is a series of pictures that allows us to look inside your body. The scanner creates images of the body in cross sections, much like slices of bread. This helps us see any problems more clearly. You may receive contrast (X-ray dye) before or during your scan. You will be asked to drink the contrast.  Who should I call with questions: If you have any questions, please call the Imaging Department where you will have your exam. Directions, parking instructions, and other information is available on our website, [email or web address].            Mar 19, 2019  4:00 PM CDT   RETURN ONC with Garima Sauceda MD   [facility] ([facility])    [address]  [place] [id number] [phone]            May 13, 2019  4:30 PM CDT   (Arrive by 4:15 PM)   Return Visit with Shahla Crawley MD   [facility] ([facility])    [address] [facility]  [address]  [facility] [id number] [phone]              Future tests that were ordered for you today     Open Future Orders        Priority Expected Expires Ordered    PHYSICAL THERAPY REFERRAL Routine  12/6/2019 12/6/2018            Who to contact     If you have questions or need follow up information about today's clinic visit or your schedule please contact [facility] directly at [phone].  Normal or non-critical lab and imaging results will be communicated to you by MyChart, letter or phone within 4 business days after the clinic has received the results. If you do not hear from us within 7 days, please contact the clinic through [email or web address] or phone. If you have a critical or abnormal lab result, we will notify you by phone as soon as possible.  Submit refill requests through [employer] or call your pharmacy and they will forward the refill request to us. Please allow 3 business days for your refill to be completed.          Additional Information About Your Visit        MyCharTvoop Information     [employer] gives you secure access to your electronic health record. If you see a primary care provider, you can also send messages to your care team and make appointments. If you have questions, please call your primary care clinic.  If you do not have a primary care provider, please call [phone] and they will assist you.        Care EveryWhere ID     This is your Care EveryWhere ID. This could be used by other organizations to access your [facility] medical records  [id number]        Your Vitals Were     Pulse Pulse Oximetry                74 97%           Blood Pressure from Last 3 Encounters:   12/06/18 158/88   11/28/18 120/72   11/26/18 129/81    Weight from Last 3  Encounters:   11/28/18 80.3 kg (177 lb)   11/26/18 80.3 kg (177 lb 0.5 oz)   11/26/18 80.3 kg (177 lb 1.6 oz)              We Performed the Following     ACUPUNCTURE REFERRAL          Today's Medication Changes          These changes are accurate as of 12/6/18  8:58 AM.  If you have any questions, ask your nurse or doctor.               These medicines have changed or have updated prescriptions.        Dose/Directions    cyclobenzaprine 10 MG tablet   Commonly known as:  FLEXERIL   This may have changed:  when to take this   Used for:  High grade B-cell lymphoma (H)        Dose:  10 mg   Take 1 tablet (10 mg) by mouth 3 times daily as needed   Quantity:  30 tablet   Refills:  0       * methocarbamol 750 MG tablet   Commonly known as:  ROBAXIN   This may have changed:  Another medication with the same name was added. Make sure you understand how and when to take each.   Used for:  Myofascial pain   Changed by:  Farzana Mccray MD        Take 1 tablet (750 mg) by mouth 4 times daily . Ok to take a 5th Robaxin on very severe days.   Quantity:  120 tablet   Refills:  3       * methocarbamol 500 MG tablet   Commonly known as:  ROBAXIN   This may have changed:  You were already taking a medication with the same name, and this prescription was added. Make sure you understand how and when to take each.   Used for:  Post-mastectomy pain syndrome   Changed by:  Farzana Mccray MD        Dose:  1000 mg   Take 2 tablets (1,000 mg) by mouth 4 times daily as needed for muscle spasms   Quantity:  90 tablet   Refills:  1       * Notice:  This list has 2 medication(s) that are the same as other medications prescribed for you. Read the directions carefully, and ask your doctor or other care provider to review them with you.         Where to get your medicines      These medications were sent to [id number] - NOEMI BRITO - [address] [place]  [address] [facility] [place] [address]     Phone:  [phone]      methocarbamol 500 MG tablet                Primary Care Provider Office Phone # Fax #    Pablo Thanh Soto [phone] [address] [facility] [address]        Equal Access to Services     RODRIGO ZAMORA : Hadii aad ku hadmoraimao Soomaali, waaxda luqadaha, qaybta kaalmada adeegyada, ranjan orozcon chanell padron lajustinoumm martin. So [facility] [phone].    ATENCIÓN: Si habla español, tiene a stiles disposición servicios gratuitos de asistencia lingüística. Llame al [phone].    We comply with applicable federal civil rights laws and [place] laws. We do not discriminate on the basis of race, color, national origin, age, disability, sex, sexual orientation, or gender identity.            Thank you!     Thank you for choosing [facility]  for your care. Our goal is always to provide you with excellent care. Hearing back from our patients is one way we can continue to improve our services. Please take a few minutes to complete the written survey that you may receive in the mail after your visit with us. Thank you!             Your Updated Medication List - Protect others around you: Learn how to safely use, store and throw away your medicines at www.disposemymeds.org.          This list is accurate as of 12/6/18  8:58 AM.  Always use your most recent med list.                   Brand Name Dispense Instructions for use Diagnosis    ACAI RAMIREZ PO      Take 50 mg by mouth        acetaminophen 325 MG tablet    TYLENOL    100 tablet    Take 2 tablets (650 mg) by mouth every 4 hours as needed for mild pain or fever    High grade B-cell lymphoma (H)       acyclovir 400 MG tablet    ZOVIRAX    30 tablet    Take 1 tablet (400 mg) by mouth 2 times daily    High grade B-cell lymphoma (H)       aluminum chloride 20 % external solution    DRYSOL    60 mL    Apply topically At Bedtime    Rash, Intertrigo       azithromycin 250 MG tablet    ZITHROMAX    6 tablet    Two tablets  first day, then one tablet daily for four days.    Cat scratch of forearm, unspecified laterality, initial encounter       AZMACORT IN      Inhale 2 puffs into the lungs as needed        bisacodyl 5 MG EC tablet     30 tablet    Take 3 tablets (15 mg) by mouth daily as needed for constipation    Constipation, unspecified constipation type       calcitRIOL 0.25 MCG capsule    ROCALTROL    90 capsule    TAKE 2 CAPSULES BY MOUTH EVERY MORNING AND TAKE 1 CAPSULE BY MOUTH EVERY NIGHT AT BEDTIME    Postsurgical hypothyroidism, Papillary carcinoma, follicular variant (H), Metastasis to cervical lymph node (H)       CALCIUM CITRATE + D3 315-250 MG-UNIT Tabs per tablet   Generic drug:  calcium citrate-vitamin D      Take 2 tablets by mouth 2 times daily        celecoxib 200 MG capsule    celeBREX    60 capsule    Take 1 capsule (200 mg) by mouth 2 times daily    Chest wall pain       cetirizine 10 MG tablet    ZYRTEC ALLERGY    90 tablet    Take 1 tablet (10 mg) by mouth 3 times daily On hold for lab test.    High grade B-cell lymphoma (H)       COMPOUND CONTAINING CONTROLLED SUBSTANCE - PHARMACY TO MIX COMPOUNDED MEDICATION    CMPD RX    120 g    COMPOUNDED ANALGESIC CREAM AMITRIPTYLINE 2% KETAMINE 6% GABAPENTIN 5% CLONIDINE 0.2% LIDOCAINE 10% VANICREAM 1 QSAD Apply small amount to affected area two times daily.    Chest wall pain, Personal history of malignant neoplasm of breast, Other chronic postprocedural pain       cromolyn 4 % ophthalmic solution    OPTICROM    10 mL    Place 1 drop into both eyes 4 times daily    Idiopathic mast cell activation syndrome (H)       cromolyn sodium 5.2 MG/ACT nasal aerosol    NASALCROM    1 Bottle    SPRAY ONE SPRAY( 1 ML) IN NOSTRIL DAILY    Mast cell disease, systemic       [employer] GUMMY BEARS CHILDREN Chew     120 tablet    Take 5 g by mouth daily (2 gummy= 5 g =1 serving)    High grade B-cell lymphoma (H)       cyclobenzaprine 10 MG tablet    FLEXERIL    30 tablet    Take 1 tablet  (10 mg) by mouth 3 times daily as needed    High grade B-cell lymphoma (H)       diazepam 5 MG tablet    VALIUM    90 tablet    TAKE ONE TABLET ( 5 MG) BY MOUTH THREE TIMES DAILY AS NEEDED FOR MUSCLE SPASMS. FILL 10/08/2018    Chest wall pain       diclofenac 1 % topical gel    VOLTAREN    100 g    Apply affected area two times daily PRN using enclosed dosing card.    Myofascial pain       EPINEPHrine 0.3 MG/0.3ML injection 2-pack    EPIPEN 2-CARIDAD    0.6 mL    Inject 0.3 mLs (0.3 mg) into the muscle once as needed for anaphylaxis    Anaphylaxis, subsequent encounter       fluticasone 50 MCG/ACT nasal spray    FLONASE    1 Bottle    Spray 1-2 sprays into both nostrils daily    Bacterial sinusitis       furosemide 20 MG tablet    LASIX    90 tablet    Take 1 tablet (20 mg) by mouth 2 times daily    Localized edema, Chest wall pain, Ductal carcinoma in situ (DCIS) of breast, unspecified laterality, Papillary carcinoma, follicular variant (H), Personal history of malignant neoplasm of breast       levofloxacin 750 MG tablet    LEVAQUIN    10 tablet    Take 1 tablet (750 mg) by mouth daily    Bacterial sinusitis       levothyroxine 112 MCG tablet    SYNTHROID/LEVOTHROID    189 tablet    ON MONDAY THRU SATURDAY TAKE TWO TABLETS DAILY AND ON SUNDAY TAKE 3 TABLETS DAILY.    Postsurgical hypothyroidism, Papillary carcinoma, follicular variant (H), Postsurgical hypoparathyroidism (H), Thyroid cancer (H)       lidocaine 5 % external ointment    XYLOCAINE    350 g    Apply quarter size amount to chest and back up to 3 times daily as needed for pain.    Chest wall pain, Localized edema, Ductal carcinoma in situ (DCIS) of breast, unspecified laterality, Papillary carcinoma, follicular variant (H), Personal history of malignant neoplasm of breast       lidocaine visc 2% 2.5mL/5mL & maalox/mylanta w/ simeth 2.5mL/5mL & diphenhydrAMINE 5mg/5mL Susp suspension    [facility]    360 mL    Swish and spit 10 mLs in mouth  every 6 hours as needed for mouth sores    Stomatitis and mucositis, High grade B-cell lymphoma (H)       * lidocaine-prilocaine 2.5-2.5 % external cream    EMLA    30 g    Apply topically as needed (port access pain.)    Neoplasm related pain, Chest wall pain       * lidocaine-prilocaine 2.5-2.5 % external cream    EMLA    60 g    Apply topically as needed for moderate pain    High grade B-cell lymphoma (H)       magic mouthwash suspension (diphenhydrAMINE, lidocaine, aluminum-magnesium & simethicone) compounding kit     237 mL    Swish and spit 5-10 mLs in mouth every 6 hours as needed for mouth sores    Stomatitis and mucositis, High grade B-cell lymphoma (H)       magnesium 200 MG Tabs      Take 200 mg by mouth 3 times daily        * methocarbamol 750 MG tablet    ROBAXIN    120 tablet    Take 1 tablet (750 mg) by mouth 4 times daily . Ok to take a 5th Robaxin on very severe days.    Myofascial pain       * methocarbamol 500 MG tablet    ROBAXIN    90 tablet    Take 2 tablets (1,000 mg) by mouth 4 times daily as needed for muscle spasms    Post-mastectomy pain syndrome       montelukast 10 MG tablet    SINGULAIR    120 tablet    Take 2 tablets (20 mg) by mouth 2 times daily    Idiopathic mast cell activation syndrome (H)       morphine 30 MG CR tablet    MS CONTIN    120 tablet    Take 1 tablet (30 mg) by mouth every 8 hours . Take an addition pill at night such that your evening dose is 60mg    Neoplasm related pain       naloxone 4 MG/0.1ML nasal spray    NARCAN    0.2 mL    Spray 1 spray (4 mg) into one nostril alternating nostrils as needed for opioid reversal every 2-3 minutes until assistance arrives    Encounter for long-term opiate analgesic use       olopatadine 0.1 % ophthalmic solution    PATANOL     Apply 1 drop to eye        * ondansetron 8 MG ODT tab    ZOFRAN-ODT    30 tablet    DISSOLVE ONE TABLET IN MOUTH EVERY EIGHT HOURS AS NEEDED    High grade B-cell lymphoma (H), Nausea and vomiting,  intractability of vomiting not specified, unspecified vomiting type, Breast cancer (H)       * ondansetron 8 MG ODT tab    ZOFRAN-ODT    30 tablet    Take 1 tablet (8 mg) by mouth every 8 hours as needed    High grade B-cell lymphoma (H), Nausea and vomiting, intractability of vomiting not specified, unspecified vomiting type       * order for DME     1 Units    Compression stockings, medium grade, please measure and fit. Please dispense a pair.    Peripheral edema       * order for DME     2 Units    Equipment being ordered: compression stockings. 20-30 mm or 30 - 40 mm as patient can tolerate. Physical therapy to determine if they should be above or below the knee.    Venous stasis       * order for DME     2 Device    Equipment being ordered: compression bra    Malignant neoplasm of right female breast (H)       order for DME     1 Units    Compression socks - knee high 20-30 mmHg zippered if possible    Lower extremity edema       oxyCODONE IR 30 MG tablet    ROXICODONE    132 tablet    Take 1 tablet (30 mg) by mouth every 4 hours as needed for moderate to severe pain . This is a 30day supply    Other chronic postprocedural pain, Encounter for long-term opiate analgesic use       polyethylene glycol powder    MIRALAX    510 g    Take 17 g (1 capful) by mouth daily    Acute constipation       potassium chloride ER 20 MEQ CR tablet    KLOR-CON    60 tablet    Take 1 tablet (20 mEq) by mouth 2 times daily    Hypokalemia       PROBIOTIC DAILY PO      Take 1 capsule by mouth daily Lacto acid bifidobacterium    Breast cancer, unspecified laterality, Thyroid cancer (H), Chronic arthralgias of knees and hips, unspecified laterality       prochlorperazine 10 MG tablet    COMPAZINE     Take 10 mg by mouth as needed        ranitidine 75 MG tablet    ZANTAC    90 tablet    Take 1 tablet (75 mg) by mouth 3 times daily    Mast cell disease, systemic       senna-docusate 8.6-50 MG tablet    SENOKOT-S/PERICOLACE    60 tablet     Take 1-2 tablets by mouth 2 times daily as needed for constipation    Acute constipation       SUMAtriptan 50 MG tablet    IMITREX    30 tablet    Take 1 tablet (50 mg) by mouth as needed    Intractable persistent migraine aura without cerebral infarction and without status migrainosus       tamoxifen 20 MG tablet    NOLVADEX    90 tablet    Take 1 tablet (20 mg) by mouth daily    Ductal carcinoma in situ (DCIS) of breast, unspecified laterality, Localized edema, Chest wall pain, Papillary carcinoma, follicular variant (H), Personal history of malignant neoplasm of breast       triamcinolone 0.025 % external ointment    KENALOG     Apply topically as needed        TUMS ULTRA 1000 1000 MG Chew   Generic drug:  calcium carbonate antacid      Take 1 tablet (1,000 mg) by mouth 4 times daily as needed (severe muscle cramps)    Hypocalcemia       UNABLE TO FIND      3 tablets 3 times daily MEDICATION NAME: calcium D-Glucarate  3 caps contain 180mg of elemental calcium.        UNABLE TO FIND      Take 2 capsules by mouth 3 times daily Muscle Mag. 2 caps contain B1 20mg, B2 20mg, B6 10mg, magesium 20mg, manganese 2mg.        UNABLE TO FIND      2 tablets 3 times daily MEDICATION NAME: Digestzymes    Thyroid cancer (H), Postsurgical hypothyroidism, Postsurgical hypoparathyroidism (H)       UNABLE TO FIND      1 tablet daily MEDICATION NAME: Pure Encapsulations    Thyroid cancer (H), Postsurgical hypothyroidism, Postsurgical hypoparathyroidism (H)       VENTOLIN  (90 Base) MCG/ACT inhaler   Generic drug:  albuterol     18 g    INHALE 2 PUFFS INTO THE LUNGS EVERY 4 HOURS AS NEEDED FOR SHORTNESS OF BREATH OR DIFFICULT BREATHING OR WHEEZING    Mild intermittent asthma without complication       vitamin D3 2000 units Caps      Take 5,000 Units by mouth daily    Thyroid cancer (H), Postsurgical hypothyroidism, Papillary carcinoma, follicular variant (H), Postsurgical hypoparathyroidism (H)       * Notice:  This list has 9  medication(s) that are the same as other medications prescribed for you. Read the directions carefully, and ask your doctor or other care provider to review them with you.

## 2018-12-06 NOTE — PATIENT INSTRUCTIONS
1. Continue with Tylenol 1,000  Mg-  Three times a day as needed.     2. Continue with Celebrex 200 mg- Two times a day.     3. Methocarbamol (Robaxin) increased to 1,000 mg- Use four times a day as needed for muscle spasms.    4. Continue with Opioid Taper per Dr. Benitez.     5. Physical Therapy Referral placed- If you have not heard from the scheduling office within 2 business days, please call [phone] for all locations    6. Acupuncture referral placed-   [facility] [phone]    -Please call your insurance provider to find out about acupuncture coverage, being that not all policies cover acupuncture services.    You can ask your insurance about coverage regarding these additional locations:     [facility]- Kat Rogers [phone]    ([facility] Lori mayes- [phone]    [facility] [phone]  Jd Kwong   [address] [place] [address]    Follow up: As needed.       To speak with a nurse, schedule/reschedule/cancel a clinic appointment, or request a medication refill call: [phone]     You can also reach us by [employer]: [email or web address]    For refills, please call on Monday, 1 week before your medication runs out. The doctors are not always in clinic, so this gives us time to get your prescriptions ready.  Please let us know the name of the medication you are requesting a refill of.

## 2018-12-06 NOTE — TELEPHONE ENCOUNTER
Reason for Call:  Form, our goal is to have forms completed with 72 hours, however, some forms may require a visit or additional information.    Type of letter, form or note:  FMLA    Who is the form from?: Patient    Where did the form come from: Patient or family brought in       What clinic location was the form placed at?: [facility]    Where the form was placed: 's Box    What number is listed as a contact on the form?: [phone] FAX       Additional comments: Please fax as soon as possible    Call taken on 12/6/2018 at 2:11 PM by Sarah Pelletier

## 2018-12-06 NOTE — LETTER
12/6/2018       RE: Tisha Arias  [address] [place] [address]     Dear Colleague,    Thank you for referring your patient, Tisha Arias, to the [facility] at [facility]. Please see a copy of my visit note below.        [facility] : Consultation Note    Patient: Tisha Arias Age: 58 year old   MRN: [id number] Referred by:  Misbah     Date of Visit: December 6, 2018    Reason for consultation:    Tisha Arias is a 58 year old female who is seen in consultation today at the request of her provider,Dr. Crawley for a comprehensive evaluation and management of pain.  Primary Care Provider is Pablo Soto.  Opiate pain medications are being prescribed by - Dr. Benitez.    Chief complaints:  Postmastectomy pain syndrome, myofascial chest wall pain    History of Present illness:     Tisha Arias is a 58 year old female with complex chronic pain history.  She had bilateral mastectomy and reconstruction for breast cancer.  After that she developed postmastectomy pain syndrome, which seems to be myofascial chest wall pain.  She has been managing  her symptoms with chronic opioid therapy prescribed by Dr. Benitez.  Over the years, she reported several other nonspecific symptoms including sternal pain and back pain.  The doses of opioids were increased at the time to treat tumor related pain however the patient did not report any improvement of her symptoms.  Therefore she was suggested to wean off of her opioid. However she reports that the weaning regimen makes her symptoms worse.    Her cancer history is extensive.  She was initially diagnosed  with a breast cancer on 12/2011.  Had bilateral mastectomy with immediate reconstruction on 01/30/2012 by Dr. Daugherty.  The same year she had prophylactic hysterectomy and nephrectomy.   In 2013, she underwent a PET scan in 2013 which revealed a thyroid nodule which was biopsied and consistent with a papillary carcinoma.  She has had undergone resection as well as radioiodine treatment. In 2017 she was diagnosed with  3 cm paraspinal mass and subcarinal adenopathy. The biopsy of the T10 vertebral body shows high-grade B-cell lymphoma.  She has been undergoing chemotherapy under the care of Dr. Sauceda.  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  She wants to apply for disability and has requested to fill out her disability paperwork.    [place] Prescription Monitoring Program:   Reviewed. 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 No real need for Epipen for this alone.     GDM (gestational diabetes mellitus)     w first pregnancy only     Gestational hypertension      Lymphedema     jackson arms, chest     Migraine      Neuropathy associated with cancer (H)      Papillary carcinoma, follicular variant (H) 6/28/2013    pT3, N1, Mx, thyroid     Post-surgical hypothyroidism      Renal disease     kidney stones     Rhinitis, allergic to other allergen      Right Breast mass and microcalcifications 12/13/2011     Seasonal allergic conjunctivitis      Seasonal allergic rhinitis 4/12/11 skin tests pos. for: dog/M/T/G/W--NEGATIVE FOOD TESTS FOR: shrimp, crab, lobster, coconut       Past Surgical History:  Past Surgical History:   Procedure Laterality Date     BACK SURGERY  2000,    Bulging disc w nerve root impigment     BIOPSY BREAST  1989    right     BIOPSY BREAST  12/13/11    right,  core and sterotactic     BONE MARROW BIOPSY, BONE SPECIMEN, NEEDLE/TROCAR Left 10/3/2017    Procedure: BIOPSY BONE MARROW;  Bone Marrow Biopsy with aspirate;  Surgeon: Amelia Watkins PA-C;  [facility]     BYPASS GASTRIC, CHOLECYSTECTOMY, COMBINED       C LAPAROSCOPIC GASTRIC RESTRICTIVE PX, W/GASTRIC BYPASS/ GAMALIEL-EN-Y, < 150CM      Gamaliel en Y?      SECTION       COLONOSCOPY      normal     COSMETIC SURGERY  2012    Final Stage of Mastectomy     DAVINCI HYSTERECTOMY TOTAL, BILATERAL SALPINGO-OOPHORECTOMY, COMBINED  2012    Procedure: COMBINED DAVINCI HYSTERECTOMY TOTAL, SALPINGO-OOPHORECTOMY;  Davinci Total Laparoscopic Hysterectomy, Bilateral Salpingo Oophorectomy, Pelvic Washings, Cystoscopy;  Surgeon: Evie Sheikh MD;  Location: [facility] OR     ENT SURGERY       ESOPHAGOSCOPY, GASTROSCOPY, DUODENOSCOPY (EGD), COMBINED N/A 2017    Procedure: COMBINED ENDOSCOPIC ULTRASOUND, ESOPHAGOSCOPY, GASTROSCOPY, DUODENOSCOPY (EGD), FINE NEEDLE ASPIRATE/BIOPSY;  Esophagogastroduodenoscopy, Endoscopic Ultrasound, with fine needle biopsy aspirate;  Surgeon: Patrick Robles MD;  Location: [facility] OR     GI SURGERY  2007    Gamaliel-en Y w cholecystectomy     GYN SURGERY  89,1/10/92    2 c-sections     HYSTERECTOMY, PAP NO LONGER INDICATED      DeVinci assister lap hyst with BSO     INSERT PORT VASCULAR ACCESS Right 10/4/2017    Procedure: INSERT PORT VASCULAR ACCESS;  Port Placement;  Surgeon: Jc Goodman PA-C;  [facility]     IRRIGATION AND DEBRIDEMENT BREAST  3/1/2012    Procedure:IRRIGATION AND DEBRIDEMENT BREAST; Irrigation and Debridement, Wound Closure Right Breast; Surgeon:JUNG GUILLEN; [facility] OR     MASTECTOMY, RECONSTRUCT BREAST, COMBINED  2012    Procedure:COMBINED MASTECTOMY, RECONSTRUCT BREAST; Bilateral Mastectomies, Right Axillary Hay Node Biopsy, Bilateral Breast Reconstruction with Tissue Expanders, Reconstruction  of inframammary fold, bilateral pain management systems; Surgeon:ANUPAM CAMPBELL; [facility] OR     RECONSTRUCT BREAST  8/31/2012    Procedure: RECONSTRUCT BREAST;  Bilateral 2nd stage breast reconstruction, revision,       SOFT TISSUE SURGERY  1-30-12    Mastectomy w severe myofascial pain syndrome     THYROIDECTOMY  7/10/2013    Procedure: THYROIDECTOMY;  Total Thyroidectomy with central neck dissection;  Surgeon: Indiana Sneed MD;  [facility]: [facility] OR     TONSILLECTOMY      childhood       Medications:  Current Outpatient Prescriptions   Medication Sig Dispense Refill     ACAI RAMIREZ PO Take 50 mg by mouth       acetaminophen (TYLENOL) 325 MG tablet Take 2 tablets (650 mg) by mouth every 4 hours as needed for mild pain or fever 100 tablet      acyclovir (ZOVIRAX) 400 MG tablet Take 1 tablet (400 mg) by mouth 2 times daily (Patient not taking: Reported on 10/11/2018) 30 tablet 0     aluminum chloride (DRYSOL) 20 % external solution Apply topically At Bedtime 60 mL 3     azithromycin (ZITHROMAX) 250 MG tablet Two tablets first day, then one tablet daily for four days. (Patient not taking: Reported on 10/11/2018) 6 tablet 0     bisacodyl (DULCOLAX) 5 MG EC tablet Take 3 tablets (15 mg) by mouth daily as needed for constipation 30 tablet 0     calcitRIOL (ROCALTROL) 0.25 MCG capsule TAKE 2 CAPSULES BY MOUTH EVERY MORNING AND TAKE 1 CAPSULE BY MOUTH EVERY NIGHT AT BEDTIME 90 capsule 11     calcium carbonate antacid (TUMS ULTRA 1000) 1000 MG CHEW Take 1 tablet (1,000 mg) by mouth 4 times daily as needed (severe muscle cramps)       calcium citrate-vitamin D (CALCIUM CITRATE + D3) 315-250 MG-UNIT TABS per tablet Take 2 tablets by mouth 2 times daily       celecoxib (CELEBREX) 200 MG capsule Take 1 capsule (200 mg) by mouth 2 times daily 60 capsule 2     cetirizine (ZYRTEC ALLERGY) 10 MG tablet Take 1 tablet (10 mg) by mouth 3 times daily On hold for lab test. 90 tablet 0     Cholecalciferol (VITAMIN D3) 2000 units  CAPS Take 5,000 Units by mouth daily  0     COMPOUND CONTAINING CONTROLLED SUBSTANCE (CMPD RX) - PHARMACY TO MIX COMPOUNDED MEDICATION COMPOUNDED ANALGESIC CREAM AMITRIPTYLINE 2% KETAMINE 6% GABAPENTIN 5% CLONIDINE 0.2% LIDOCAINE 10% VANICREAM 1 QSAD  Apply small amount to affected area two times daily. 120 g 6     cromolyn (OPTICROM) 4 % ophthalmic solution Place 1 drop into both eyes 4 times daily 10 mL 0     cromolyn sodium (NASALCROM) 5.2 MG/ACT AERS Inhaler SPRAY ONE SPRAY( 1 ML) IN NOSTRIL DAILY 1 Bottle 6     [employer] FIBER GUMMY BEARS CHILDREN CHEW Take 5 g by mouth daily (2 gummy= 5 g =1 serving) 120 tablet 3     cyclobenzaprine (FLEXERIL) 10 MG tablet Take 1 tablet (10 mg) by mouth 3 times daily as needed (Patient taking differently: Take 10 mg by mouth 3 times daily ) 30 tablet 0     diazepam (VALIUM) 5 MG tablet TAKE ONE TABLET ( 5 MG) BY MOUTH THREE TIMES DAILY AS NEEDED FOR MUSCLE SPASMS. FILL 10/08/2018 90 tablet 1     diclofenac (VOLTAREN) 1 % GEL topical gel Apply affected area two times daily PRN using enclosed dosing card. 100 g 3     EPINEPHrine (EPIPEN 2-CARIDAD) 0.3 MG/0.3ML injection 2-pack Inject 0.3 mLs (0.3 mg) into the muscle once as needed for anaphylaxis (Patient not taking: Reported on 10/11/2018) 0.6 mL 3     fluticasone (FLONASE) 50 MCG/ACT spray Spray 1-2 sprays into both nostrils daily 1 Bottle 11     furosemide (LASIX) 20 MG tablet Take 1 tablet (20 mg) by mouth 2 times daily 90 tablet 3     levofloxacin (LEVAQUIN) 750 MG tablet Take 1 tablet (750 mg) by mouth daily 10 tablet 0     levothyroxine (SYNTHROID/LEVOTHROID) 112 MCG tablet ON MONDAY THRU SATURDAY TAKE TWO TABLETS DAILY AND ON SUNDAY TAKE 3 TABLETS DAILY. 189 tablet 3     lidocaine (XYLOCAINE) 5 % ointment Apply quarter size amount to chest and back up to 3 times daily as needed for pain. 350 g 1     lidocaine-prilocaine (EMLA) cream Apply topically as needed for moderate pain (Patient not taking: Reported on 11/26/2018) 60 g 1      lidocaine-prilocaine (EMLA) cream Apply topically as needed (port access pain.) 30 g 1     magic mouthwash (FIRST-MOUTHWASH BLM) suspension Swish and spit 5-10 mLs in mouth every 6 hours as needed for mouth sores 237 mL 1     magic mouthwash suspension (diphenhydrAMINE, lidocaine, aluminum-magnesium & simethicone) Swish and spit 10 mLs in mouth every 6 hours as needed for mouth sores (Patient not taking: Reported on 10/11/2018) 360 mL 1     magnesium 200 MG TABS Take 200 mg by mouth 3 times daily       methocarbamol (ROBAXIN) 750 MG tablet Take 1 tablet (750 mg) by mouth 4 times daily . Ok to take a 5th Robaxin on very severe days. 120 tablet 3     montelukast (SINGULAIR) 10 MG tablet Take 2 tablets (20 mg) by mouth 2 times daily 120 tablet 11     morphine (MS CONTIN) 30 MG CR tablet Take 1 tablet (30 mg) by mouth every 8 hours . Take an addition pill at night such that your evening dose is 60mg 120 tablet 0     naloxone (NARCAN) nasal spray Spray 1 spray (4 mg) into one nostril alternating nostrils as needed for opioid reversal every 2-3 minutes until assistance arrives (Patient not taking: Reported on 10/11/2018) 0.2 mL 0     olopatadine (PATANOL) 0.1 % ophthalmic solution Apply 1 drop to eye       ondansetron (ZOFRAN-ODT) 8 MG ODT tab Take 1 tablet (8 mg) by mouth every 8 hours as needed (Patient not taking: Reported on 11/26/2018) 30 tablet 1     ondansetron (ZOFRAN-ODT) 8 MG ODT tab DISSOLVE ONE TABLET IN MOUTH EVERY EIGHT HOURS AS NEEDED  30 tablet 3     order for DME Compression socks - knee high 20-30 mmHg zippered if possible 1 Units 11     order for DME Equipment being ordered: compression stockings. 20-30 mm or 30 - 40 mm as patient can tolerate. Physical therapy to determine if they should be above or below the knee. 2 Units 4     order for DME Equipment being ordered: compression bra 2 Device 1     order for DME Compression stockings, medium grade, please measure and fit. Please dispense a pair. 1 Units  0     oxyCODONE IR (ROXICODONE) 30 MG tablet Take 1 tablet (30 mg) by mouth every 4 hours as needed for moderate to severe pain . This is a 30day supply 132 tablet 0     polyethylene glycol (MIRALAX) powder Take 17 g (1 capful) by mouth daily 510 g 0     potassium chloride SA (KLOR-CON) 20 MEQ CR tablet Take 1 tablet (20 mEq) by mouth 2 times daily 60 tablet 3     Probiotic Product (PROBIOTIC DAILY PO) Take 1 capsule by mouth daily Lacto acid bifidobacterium       prochlorperazine (COMPAZINE) 10 MG tablet Take 10 mg by mouth as needed  3     ranitidine (ZANTAC) 75 MG tablet Take 1 tablet (75 mg) by mouth 3 times daily 90 tablet 0     senna-docusate (SENOKOT-S;PERICOLACE) 8.6-50 MG per tablet Take 1-2 tablets by mouth 2 times daily as needed for constipation (Patient not taking: Reported on 10/11/2018) 60 tablet 0     SUMAtriptan (IMITREX) 50 MG tablet Take 1 tablet (50 mg) by mouth as needed 30 tablet 3     tamoxifen (NOLVADEX) 20 MG tablet Take 1 tablet (20 mg) by mouth daily 90 tablet 3     triamcinolone (KENALOG) 0.025 % ointment Apply topically as needed  3     Triamcinolone Acetonide (AZMACORT IN) Inhale 2 puffs into the lungs as needed       UNABLE TO FIND Take 2 capsules by mouth 3 times daily Muscle Mag. 2 caps contain B1 20mg, B2 20mg, B6 10mg, magesium 20mg, manganese 2mg.       UNABLE TO FIND 3 tablets 3 times daily MEDICATION NAME: calcium D-Glucarate   3 caps contain 180mg of elemental calcium.       UNABLE TO FIND 2 tablets 3 times daily MEDICATION NAME: Digestzymes        UNABLE TO FIND 1 tablet daily MEDICATION NAME: Pure Encapsulations       VENTOLIN  (90 BASE) MCG/ACT Inhaler INHALE 2 PUFFS INTO THE LUNGS EVERY 4 HOURS AS NEEDED FOR SHORTNESS OF BREATH OR DIFFICULT BREATHING OR WHEEZING 18 g 3       Analgesic Medications:   Medications related to Pain Management     None           Allergies:       Allergies   Allergen Reactions     Baclofen Other (See Comments) and Unknown     Other  reaction(s): Edema, chest pain, seizures.      Duloxetine Anaphylaxis and Other (See Comments)     Flushing, tremor/muscle twitching and edema  Serotonin syndrome     No Clinical Screening - See Comments Shortness Of Breath, Palpitations, Anaphylaxis, Itching, Swelling, Difficulty breathing and Rash     Sukhjinder wipes- oral allergy -  July 2015: throat tightness from a Chinese herbal medicine Wilmer Tran     Serotonin Anxiety, Other (See Comments) and Swelling     Seizures      Tree Nuts [Nuts] Shortness Of Breath     Amitriptyline Hcl Swelling     Birch Trees      Potatoes, carrots, cherries, celery, apple, pears, plums, peaches, parsnip, kiwi, hazelnuts, and apricots,      Blue Dyes Itching     Headaches       Cymbalta Other (See Comments)     Flushing, tremor/muscle twitching and edema     Gabapentin Other (See Comments)     edema  Systemic edema, weaned off from Feb to March per Dr. Dowd.    edema     Grass      Mugwort [Artemisia Vulgaris]      Various spices     Ragweeds      Melons, bananas, cucumbers, zucchini.     Topamax      Nortriptyline Itching, Visual Disturbance, Swelling, GI Disturbance, Anxiety, Other (See Comments) and Nausea     Other reaction(s): Swelling       Social History:    Social History     Social History     Marital status:      Spouse name: N/A     Number of children: N/A     Years of education: N/A     Occupational History     Not on file.     Social History Main Topics     Smoking status: Never Smoker     Smokeless tobacco: Never Used      Comment: no 2nd hand smoke exposure     Alcohol use No      Comment: rare     Drug use: No     Sexual activity: Not Currently     Partners: Male     Birth control/ protection: Surgical      Comment: Tubal Ligation then hysterectomy     Other Topics Concern     Parent/Sibling W/ Cabg, Mi Or Angioplasty Before 65f 55m? Yes     Adrian Martinez     Social History Narrative     Social History     Social History Narrative         Family  history:  Family History   Problem Relation Age of Onset     Allergies Mother      Arthritis Mother      Cancer Mother      uterine/uterine     Hypertension Mother      on HCTZ     Hyperlipidemia Mother      Cerebrovascular Disease Mother      s/p brain surgery     Breast Cancer Mother      Depression Mother      Anxiety Disorder Mother      Anesthesia Reaction Mother      Asthma Mother      Skin Cancer Mother      Heart Disease Father      Diabetes Father      Coronary Artery Disease Father      Hypertension Father      Hyperlipidemia Father      Cerebrovascular Disease Father      Multiple strokes     Obesity Father      Diabetes Brother      Depression Brother      Hypertension Brother      Cancer Maternal Grandfather      pancreatic cancer/stomach cancer     Prostate Cancer Maternal Grandfather      Prostrate and Bladder     Other Cancer Maternal Grandfather      Pancreatic 1988, Bladder 1977     Cancer Maternal Grandmother      uterine     Breast Cancer Maternal Grandmother      Skin Cancer Maternal Grandmother      Cancer Brother 57     pancreatic cancer     Diabetes Brother      Breast Cancer Cousin      Grand mothers sister     Other Cancer Brother      Stage 3 Pancreatic 2-2015     Depression Brother      Asthma Brother      Obesity Brother      Anesthesia Reaction Other      H/a, itching, drug interactions     Asthma Other      Cancer Brother      Pancreatic      Thyroid Disease No family hx of          Physical Exam:  Vitals:    12/06/18 0742   BP: 158/88   Pulse: 74   SpO2: 97%       General: Awake in no apparent distress.   Eyes: Sclerae are anicteric. PERRLA, EOMI   Neck: supple, no masses.   Lungs: unlabored.   Heart: regular rate and rhythm   Abdomen: soft non tender.  Extremities: Pulses are well palpable, no peripheral edema.   Musculoskeletal: All muscle groups are normal in bulk and tone. The patient changes position without pain behavior. The patient walks with a normal gait. Posture is normal.  Muscle strength was rated at 5/5 in all groups in the extremities. Examination of the joints reveals preserved range of motion.  The mastectomy scar on bilateral chest wall well-healed.  No hypo-or hyperesthesia noted near the mastectomy scars.  Neurologic exam: Sensation to light touch intact throughout all dermatomes bilateral upper extremities and lower extremities  Psychiatric; Normal affect.   Skin: Warm and Dry.       LABORATORY VALUES:   Recent Labs   Lab Test  11/28/18   0649  08/31/18   1726   NA  142  140   POTASSIUM  4.0  3.9   CHLORIDE  106  102   CO2  28  33*   ANIONGAP  8  6   GLC  155*  108*   BUN  18  17   CR  0.84  1.11*   ELMO  7.4*  8.4*       CBC RESULTS:   Recent Labs   Lab Test  11/28/18   0649   WBC  3.2*   RBC  4.03   HGB  13.2   HCT  40.0   MCV  99   MCH  32.8   MCHC  33.0   RDW  11.5   PLT  139*       Most Recent 3 INR's:  Recent Labs   Lab Test  12/28/17   1224  10/31/17   0540  10/30/17   0535   INR  0.99  1.03  0.96       ASSESSMENT/PLAN:                             ASSESSMENT:    - Postmastectomy pain syndrome  - Myofascial muscle pain  - Chronic pain syndrome  - Opioid use disorder       PLAN:    1. Medications.   -Tylenol 1 g 3 times daily as needed  -Celebrex 200 mg p.o. twice daily  -Robaxin 1 g 4 times daily as needed  -Taper opioids per Dr. Benitez      2. Interventional procedures:    None at this time.     3. Labs and imaging: None needed for pain management.     4. Rehab: Refer to physical therapy. Advised to perform home exercise using Cin[employer] videos  [email or web address].  The patient is also encouraged to stay active as tolerated.     5. Psychology: will consider pain psych in the furture     6. Integrated medicine: We discussed acupuncture therapy. We will refer the patient for acupuncture therapy.     7. Disposition: Follow-up as needed.    Assessment will be ongoing with changes in treatment as indicated.  Benefits/risks/alternatives to treatment  have been reviewed and the patient has been instructed to contact this office if they have any questions or concerns.  This plan of care has been discussed with the patient and the patient is in agreement.     Farzana Mccray MD, PHD      TOTAL TIME: I spent 45 minutes including greater than 50% face-to-face time counseling her about her diagnosis and treatment options.   ain, thank you for allowing me to participate in the care of your patient.

## 2018-12-06 NOTE — NURSING NOTE
LPN reviewed AVS with Pt.  Pt verbalized an understanding of information, and was asked to contact clinic with questions.    Pt was asking about their Disability paperwork, LPN was directed but Dr. Crawley's RNCC to inform pt that they should follow up with their PCP regarding this paperwork.       Elvira Romano LPN

## 2018-12-07 ENCOUNTER — TELEPHONE (OUTPATIENT)
Dept: PALLIATIVE CARE | Facility: CLINIC | Age: 58
End: 2018-12-07

## 2018-12-07 NOTE — TELEPHONE ENCOUNTER
A prior authorization is needed for the following medication prescribed.  Please complete a prior authorization with the information included below.    Medication: -Ketamine6%, Clonidine 0.2%, Gabapentin 5%, Lidocaine 10% in Vaincream (compounded)  Ingredients: Ketamine Powder [address]-71, Amitriptyline Powder [address]-09, Clonidine powder [address]-55, Gabapentin Powd [address]-07, Lidocaine Powd [address]-94, Vanicream [address]-79  RX #:[id number]  Reason for Rejection: Product service not covered    Pharmacy Insurance plan: [employer]  BIN #: [id number]  ID #: [id number]  PCN #: [id number]  Phone #: [phone]      Pharmacy NPI:[id number]      Please advise the pharmacy when the prior authorization is approved or denied.     Thank you for your time.     [facility]  [phone]

## 2018-12-07 NOTE — TELEPHONE ENCOUNTER
Patient has been contacted by patient relations department.    Emma Aquino, MSN, RN, OCN  Patient Care Supervisor  [facility]

## 2018-12-10 NOTE — TELEPHONE ENCOUNTER
Central Prior Authorization Team   Phone: [phone]      PA Initiation    Medication: FV-Ketamine6%, Clonidine 0.2%, Gabapentin 5%, Lidocaine 10% in Vaincream (compounded)-PA initiated  Insurance Company: [employer] - Phone [phone] Fax [phone]  Pharmacy Filling the Rx: [facility] - [place], [place] - [address]  Filling Pharmacy Phone: [phone]  Filling Pharmacy Fax:    Start Date: 12/10/2018

## 2018-12-11 ENCOUNTER — OFFICE VISIT (OUTPATIENT)
Dept: FAMILY MEDICINE | Facility: CLINIC | Age: 58
End: 2018-12-11
Payer: COMMERCIAL

## 2018-12-11 ENCOUNTER — TELEPHONE (OUTPATIENT)
Dept: FAMILY MEDICINE | Facility: CLINIC | Age: 58
End: 2018-12-11

## 2018-12-11 VITALS
HEART RATE: 95 BPM | OXYGEN SATURATION: 95 % | SYSTOLIC BLOOD PRESSURE: 126 MMHG | RESPIRATION RATE: 14 BRPM | TEMPERATURE: 99.9 F | DIASTOLIC BLOOD PRESSURE: 66 MMHG

## 2018-12-11 DIAGNOSIS — M79.2 NEUROPATHIC PAIN OF CHEST: Primary | ICD-10-CM

## 2018-12-11 DIAGNOSIS — G89.3 NEOPLASM RELATED PAIN: ICD-10-CM

## 2018-12-11 DIAGNOSIS — G89.4 CHRONIC PAIN DISORDER: ICD-10-CM

## 2018-12-11 DIAGNOSIS — Z79.891 LONG TERM CURRENT USE OF OPIATE ANALGESIC: ICD-10-CM

## 2018-12-11 DIAGNOSIS — G89.4 CHRONIC PAIN SYNDROME: ICD-10-CM

## 2018-12-11 DIAGNOSIS — G62.9 PERIPHERAL POLYNEUROPATHY: Primary | ICD-10-CM

## 2018-12-11 PROCEDURE — 99214 OFFICE O/P EST MOD 30 MIN: CPT | Performed by: FAMILY MEDICINE

## 2018-12-11 RX ORDER — PREGABALIN 25 MG/1
25 CAPSULE ORAL 2 TIMES DAILY
Qty: 60 CAPSULE | Refills: 1 | Status: SHIPPED | OUTPATIENT
Start: 2018-12-11 | End: 2018-12-11

## 2018-12-11 RX ORDER — GABAPENTIN 100 MG/1
100 CAPSULE ORAL 3 TIMES DAILY
Qty: 90 CAPSULE | Refills: 3 | Status: SHIPPED | OUTPATIENT
Start: 2018-12-11 | End: 2018-12-19 | Stop reason: SINTOL

## 2018-12-11 NOTE — TELEPHONE ENCOUNTER
Received VM from patient asking if she can have a neuropathic agent added to her compound cream. She would also like to get the same quantity as she was just written as that is enough.     Also received call from  [facility]. They tell me that patient is asking for Ketamine 6% + lidocaine 10% in Lipo + gabapentin.    Recent compound cream (COMPOUNDED ANALGESIC CREAM AMITRIPTYLINE 2% KETAMINE 6% GABAPENTIN 5% CLONIDINE 0.2% LIDOCAINE 10% VANICREAM 1 QSAD) is unfortunately over $700 out of pocket as it is not covered by insurance. Pharmacy is not expecting this script to be approved via prior authorization.    Per pharmacy, patient paid about $300 for Ketamine 6% + lidocaine 10% in Lipo, pharmacy has informed her it will cost more if neuropathic agent added in. Pharmacy suggests gabapentin.     Pharmacy advised they can add gabapentin in with verbal okay. Will discuss with MD and call pharmacy back.

## 2018-12-11 NOTE — TELEPHONE ENCOUNTER
Prescription written for low dose gabapentin.  Patient has tolerated low dose in the past despite the fact it's on her allergy list.    Pablo Zarco MD

## 2018-12-11 NOTE — NURSING NOTE
"Chief Complaint   Patient presents with     Forms     FMLA     Initial /66 (BP Location: Left arm, Patient Position: Chair, Cuff Size: Adult Regular)   Pulse 95   Temp 99.9  F (37.7  C) (Oral)   Resp 14   SpO2 95%   Breastfeeding? No  Estimated body mass index is 29.45 kg/m  as calculated from the following:    Height as of 11/26/18: 1.651 m (5' 5\").    Weight as of 11/28/18: 80.3 kg (177 lb).  BP completed using cuff size: regular    Ken Jolley  "

## 2018-12-11 NOTE — PROGRESS NOTES
SUBJECTIVE:   Tisha Arias is a 58 year old female who presents to clinic today for the following health issues:    Chief Complaint   Patient presents with     Forms     FMLA   patient presents to fill out FMLA paperwork.  She would like to take work leave due to chronic pain 2/2 complications from NHL and breast cancer history.  She is also wondering if there is an alternate medication she can take for neuropathic pain.  She has tried most medications already, however states that she didn't give lyrica or gabapentin a good opportunity to become effective.  Her chronic pain is predominantly in her chest.  She would like a referral to a different pain management clinic as well.    Problem list and histories reviewed & adjusted, as indicated.  Additional history: as documented    BP Readings from Last 3 Encounters:   12/17/18 98/60   12/11/18 126/66   12/06/18 158/88    Wt Readings from Last 3 Encounters:   12/17/18 83 kg (183 lb)   11/28/18 80.3 kg (177 lb)   11/26/18 80.3 kg (177 lb 0.5 oz)         Reviewed and updated as needed this visit by clinical staff  Tobacco  Allergies  Meds  Problems  Med Hx  Surg Hx  Fam Hx  Soc Hx        Reviewed and updated as needed this visit by Provider  Tobacco  Allergies  Meds  Problems  Med Hx  Surg Hx  Fam Hx       ROS:  Constitutional, HEENT, cardiovascular, pulmonary, gi and gu systems are negative, except as otherwise noted.    OBJECTIVE:     /66 (BP Location: Left arm, Patient Position: Chair, Cuff Size: Adult Regular)   Pulse 95   Temp 99.9  F (37.7  C) (Oral)   Resp 14   SpO2 95%   Breastfeeding? No   There is no height or weight on file to calculate BMI.  GENERAL: healthy, alert and no distress  EYES: Eyes grossly normal to inspection, PERRL and conjunctivae and sclerae normal  NECK: no adenopathy, no asymmetry, masses, or scars and thyroid normal to palpation  RESP: lungs clear to auscultation - no rales, rhonchi or wheezes  CV: regular  rate and rhythm, normal S1 S2, no S3 or S4, no murmur, click or rub, no peripheral edema and peripheral pulses strong  MS: no gross musculoskeletal defects noted, no edema  SKIN: no suspicious lesions or rashes  NEURO: Normal strength and tone, mentation intact and speech normal  PSYCH: mentation appears normal, affect normal/bright    ASSESSMENT/PLAN:     1. Neuropathic pain of chest  FMLA paperwork was filled out, refer patient per request.  It would be prudent to have her touch base with addiction medicine as well given her long history of opiate use and patient agreed.  Will given trial of low dose lyrica to start.  - PAIN MANAGEMENT REFERRAL  - ADDICTION MEDICINE REFERRAL    2. Chronic pain syndrome    - PAIN MANAGEMENT REFERRAL  - ADDICTION MEDICINE REFERRAL    3. Chronic pain disorder    - PAIN MANAGEMENT REFERRAL  - ADDICTION MEDICINE REFERRAL    4. Long term current use of opiate analgesic    - PAIN MANAGEMENT REFERRAL  - ADDICTION MEDICINE REFERRAL    Follow up if symptoms worsen or fail to improve.     A total of 30 minutes spent face-to-face with greater than 50% of the time spent in counseling and coordinating cares of the issues above     Pablo Zarco MD  [facility]

## 2018-12-11 NOTE — TELEPHONE ENCOUNTER
Patient is calling back about the Lyrica and said she will buy it if Dr. Soto thinks that is the best option. Please call back

## 2018-12-12 ENCOUNTER — TELEPHONE (OUTPATIENT)
Dept: PALLIATIVE CARE | Facility: CLINIC | Age: 58
End: 2018-12-12

## 2018-12-12 ENCOUNTER — TELEPHONE (OUTPATIENT)
Dept: ADDICTION MEDICINE | Facility: CLINIC | Age: 58
End: 2018-12-12

## 2018-12-12 NOTE — TELEPHONE ENCOUNTER
Called patient let her know forms were faxed and original was brought down to  for .  Dionne Rich,

## 2018-12-12 NOTE — TELEPHONE ENCOUNTER
Called her  Really encouraged her to make the appointment with the addiction medicine clinic and again tried to explain why  I don't think she at all seems to trust or believe me

## 2018-12-12 NOTE — TELEPHONE ENCOUNTER
PRIOR AUTHORIZATION DENIED    Medication: FV-Ketamine6%, Clonidine 0.2%, [facility] 5%, [facility]    Denial Date: 12/11/2018    Denial Rational: Compound contains OTC ingredients. If appealed and approved, Rx needs to be sent to [facility]        Appeal Information:

## 2018-12-17 ENCOUNTER — TELEPHONE (OUTPATIENT)
Dept: FAMILY MEDICINE | Facility: CLINIC | Age: 58
End: 2018-12-17

## 2018-12-17 ENCOUNTER — OFFICE VISIT (OUTPATIENT)
Dept: FAMILY MEDICINE | Facility: CLINIC | Age: 58
End: 2018-12-17
Payer: COMMERCIAL

## 2018-12-17 VITALS
RESPIRATION RATE: 18 BRPM | SYSTOLIC BLOOD PRESSURE: 98 MMHG | HEART RATE: 112 BPM | DIASTOLIC BLOOD PRESSURE: 60 MMHG | TEMPERATURE: 98.8 F | BODY MASS INDEX: 30.45 KG/M2 | OXYGEN SATURATION: 95 % | WEIGHT: 183 LBS

## 2018-12-17 DIAGNOSIS — G89.29 CHRONIC LOW BACK PAIN WITHOUT SCIATICA, UNSPECIFIED BACK PAIN LATERALITY: Primary | ICD-10-CM

## 2018-12-17 DIAGNOSIS — Z01.818 PREOP GENERAL PHYSICAL EXAM: Primary | ICD-10-CM

## 2018-12-17 DIAGNOSIS — M54.50 CHRONIC LOW BACK PAIN WITHOUT SCIATICA, UNSPECIFIED BACK PAIN LATERALITY: Primary | ICD-10-CM

## 2018-12-17 PROCEDURE — 93000 ELECTROCARDIOGRAM COMPLETE: CPT | Performed by: INTERNAL MEDICINE

## 2018-12-17 PROCEDURE — 99214 OFFICE O/P EST MOD 30 MIN: CPT | Performed by: INTERNAL MEDICINE

## 2018-12-17 NOTE — TELEPHONE ENCOUNTER
Reason for call:  Other   Patient called regarding (reason for call): call back  Additional comments: Shes states the daryl reacted to the robaxin - would like Flexeril. Please return call ASAP    Phone number to reach patient:  Cell number on file:    Telephone Information:   Mobile [phone]       Best Time:  ASAP    Can we leave a detailed message on this number?  YES

## 2018-12-17 NOTE — TELEPHONE ENCOUNTER
Spoke with patient regarding possible medication reaction.  Patient was seen on 12/11/18 for neuropathic pain of chest.  She was given prescription for Gabapentin and when she took this she had abdominal bloating, nausea, and hand tremors.  She slowly tapered off the medication and is not taking it now.  She also states the Robaxin does not seem to work, her symptoms worsen about 45min after taking Robaxin.   She would like prescription for Flexeril instead.  She is scheduled to have her breast implants removed on 12/29 at [facility].  Please advise-Note patient has preop today at  2:50 with Dr. Roula Mtz RN

## 2018-12-17 NOTE — PROGRESS NOTES
[facility]  [address] [place] [id number]  [phone]  Dept: [phone]    PRE-OP EVALUATION:  Today's date: 2018    Tisha Arias (: 1960) presents for pre-operative evaluation assessment as requested by Dr. Anderson.  She requires evaluation and anesthesia risk assessment prior to undergoing surgery/procedure for treatment of Removing bilateral breast implants.    Fax number for surgical facility:   Primary Physician: Pablo Soto  Type of Anesthesia Anticipated: General    Patient has a Health Care Directive or Living Will:  No     Preop Questions 2018   Who is doing your surgery? Dr Anderson   What are you having done? Removing of bilateral breast implants   Date of Surgery/Procedure: 2018   Facility or Hospital where procedure/surgery will be performed: [facility]   1.  Do you have a history of Heart attack, stroke, stent, coronary bypass surgery, or other heart surgery? No   2.  Do you ever have any pain or discomfort in your chest? YES - she has had multiple cardiac evaluations and does not have ischemic vascular disease  Or coronary heart disease . Her chest pain is musculoskeletal issues    3.  Do you have a history of  Heart Failure? No   4.   Are you troubled by shortness of breath when:  walking on a level surface, or up a slight hill, or at night? No   5.  Do you currently have a cold, bronchitis or other respiratory infection? No   6.  Do you have a cough, shortness of breath, or wheezing? No   7.  Do you sometimes get pains in the calves of your legs when you walk? No   8. Do you or anyone in your family have previous history of blood clots? No   9.  Do you or does anyone in your family have a serious bleeding problem such as prolonged bleeding following surgeries or cuts? No   10. Have you ever had problems with anemia or been told to take iron pills? YES -   Hemoglobin   Date Value Ref Range Status   2018 13.2  11.7 - 15.7 g/dL Final   ]     11. Have you had any abnormal blood loss such as black, tarry or bloody stools, or abnormal vaginal bleeding? No   12. Have you ever had a blood transfusion? YES - during chemotherapy once   13. Have you or any of your relatives ever had problems with anesthesia? No   14. Do you have sleep apnea, excessive snoring or daytime drowsiness? No   15. Do you have any prosthetic heart valves? No   16. Do you have prosthetic joints? No   17. Is there any chance that you may be pregnant? No         HPI:     HPI related to upcoming procedure: Removing of bilateral breast implants    Patient reports for a Pre-op for bilateral breast implants removal.     She has had chronic chest pain since placement of her silicone implants.    She has non-hodgkin's lymphoma stage 3. Her last treatment was January 2017. She is 11 months post-chemotherapy. They are just monitoring her now. She also has papulary and follicular stage 3 cancer.     She currently has a headache. Patient has felt nauseas with taking medication for years. Patient does not feel sick currently.    She is interested if Celebrex should be held prior to surgery. She takes it BID.     She takes Levothyroxine.     2. She does not have a heart history. Patient has had scans related to her chemotherapy.  10. Yes, she had 1 transfusion during chemotherapy.  12. Yes, she had 1 transfusion during chemotherapy.     See problem list for active medical problems.  Problems all longstanding and stable, except as noted/documented.  See ROS for pertinent symptoms related to these conditions.                                                                                                                                                          .    MEDICAL HISTORY:     Patient Active Problem List    Diagnosis Date Noted     Long term current use of opiate analgesic 12/11/2018     Priority: Medium     Kidney stone 11/26/2018     Priority: Medium      Bilateral lower extremity edema 02/13/2018     Priority: Medium     Diffuse large B cell lymphoma (H) 01/18/2018     Priority: Medium     DLBCL (diffuse large B cell lymphoma) (H) 10/06/2017     Priority: Medium     High grade B-cell lymphoma (H) 09/26/2017     Priority: Medium     Personal history of malignant neoplasm of breast 01/23/2017     Priority: Medium     Status post bariatric surgery 11/02/2016     Priority: Medium     Hypocalcemia 11/02/2016     Priority: Medium     Migraine without status migrainosus, not intractable, unspecified migraine type 10/03/2016     Priority: Medium     Rash 08/07/2016     Priority: Medium     Intertrigo 08/03/2016     Priority: Medium     Chest wall pain 04/20/2016     Priority: Medium     Mild intermittent asthma without complication 12/21/2015     Priority: Medium     Idiopathic mast cell activation syndrome (H) 11/03/2015     Priority: Medium     Chronic pain disorder 11/03/2015     Priority: Medium     Vitamin D deficiency 08/21/2015     Priority: Medium     Chronic pain, post bilateral mastectomies and breast reconstruction  08/21/2015     Priority: Medium     Patient is followed by Palliative care for ongoing prescription of pain medication.  All refills should be approved by this provider, or covering partner.    Medication(s): morphine BID  Maximum quantity per month: Per palliative care   Clinic visit frequency required:       Controlled substance agreement on file: No    Pain Clinic evaluation in the past: Yes       Location(s):  [facility] Total Score(s):  No flowsheet data found.    Last [facility] website verification:  none   [email or web address]         Compound heterozygous MTHFR mutation [id number] (H) 04/13/2015     Priority: Medium     Metastasis to cervical lymph node (H) 09/26/2014     Priority: Medium     S/P breast reconstruction 05/28/2014     Priority: Medium     S/P thyroidectomy 07/10/2013     Priority: Medium     Postsurgical subclinical  hypoparathyroidism (H) 07/10/2013     Priority: Medium     Postsurgical hypothyroidism 07/10/2013     Priority: Medium     Papillary carcinoma, follicular variant (H) 06/28/2013     Priority: Medium     Medication side effects 06/28/2013     Priority: Medium     Baclofen and topamax       Serotonin neurotoxicity 06/20/2013     Priority: Medium     Neuropathic pain of chest 05/30/2013     Priority: Medium     Atrophic vaginitis 05/06/2013     Priority: Medium     History of migraine headaches 05/01/2013     Priority: Medium     Intestinal malabsorption 05/01/2013     Priority: Medium     History of gastric bypass 05/01/2013     Priority: Medium     Lymphedema of breast 03/06/2013     Priority: Medium     Family history of uterine cancer 07/26/2012     Priority: Medium     Infection 03/06/2012     Priority: Medium     Cellulitis 03/06/2012     Priority: Medium     Breast cancer (H) 02/09/2012     Priority: Medium     DCIS, bilateral mastectomies, 1/2012       DCIS (ductal carcinoma in situ) of right breast 12/29/2011     Priority: Medium     CARDIOVASCULAR SCREENING; LDL GOAL LESS THAN 160 06/09/2011     Priority: Medium     Seasonal allergic rhinitis      Priority: Medium     Allergic rhinitis due to animal dander      Priority: Medium     Food intolerance      Priority: Medium      Past Medical History:   Diagnosis Date     Allergic rhinitis due to animal dander      Asthma      Breast cancer (H) 1/30/12    right     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 No real need for Epipen for this alone.     GDM (gestational diabetes mellitus)     w first pregnancy only     Gestational hypertension      Lymphedema     jackson arms, chest     Migraine      Neuropathy associated with cancer (H)      Papillary carcinoma, follicular variant (H)  2013    pT3, N1, Mx, thyroid     Post-surgical hypothyroidism      Renal disease     kidney stones     Rhinitis, allergic to other allergen      Right Breast mass and microcalcifications 2011     Seasonal allergic conjunctivitis      Seasonal allergic rhinitis 11 skin tests pos. for: dog/M/T/G/W--NEGATIVE FOOD TESTS FOR: shrimp, crab, lobster, coconut     Past Surgical History:   Procedure Laterality Date     BACK SURGERY  ,    Bulging disc w nerve root impigment     BIOPSY BREAST      right     BIOPSY BREAST  11    right, core and sterotactic     BONE MARROW BIOPSY, BONE SPECIMEN, NEEDLE/TROCAR Left 10/3/2017    Procedure: BIOPSY BONE MARROW;  Bone Marrow Biopsy with aspirate;  Surgeon: Amelia Watkins PA-C;  Location: [facility] OR     BYPASS GASTRIC, CHOLECYSTECTOMY, COMBINED       C LAPAROSCOPIC GASTRIC RESTRICTIVE PX, W/GASTRIC BYPASS/ GAMALIEL-EN-Y, < 150CM      Gamaliel en Y?      SECTION       COLONOSCOPY      normal     COSMETIC SURGERY  2012    Final Stage of Mastectomy     DAVINCI HYSTERECTOMY TOTAL, BILATERAL SALPINGO-OOPHORECTOMY, COMBINED  2012    Procedure: COMBINED DAVINCI HYSTERECTOMY TOTAL, SALPINGO-OOPHORECTOMY;  Davinci Total Laparoscopic Hysterectomy, Bilateral Salpingo Oophorectomy, Pelvic Washings, Cystoscopy;  Surgeon: Evie Sheikh MD;  Location: [facility] OR     ENT SURGERY       ESOPHAGOSCOPY, GASTROSCOPY, DUODENOSCOPY (EGD), COMBINED N/A 2017    Procedure: COMBINED ENDOSCOPIC ULTRASOUND, ESOPHAGOSCOPY, GASTROSCOPY, DUODENOSCOPY (EGD), FINE NEEDLE ASPIRATE/BIOPSY;  Esophagogastroduodenoscopy, Endoscopic Ultrasound, with fine needle biopsy aspirate;  Surgeon: Patrick Robles MD;  Location: [facility] OR     GI SURGERY  2007    Gamaliel-en Y w cholecystectomy     GYN SURGERY  89,1/10/92    2 c-sections     HYSTERECTOMY, PAP NO LONGER INDICATED      DeVinci assister lap hyst with BSO     INSERT PORT VASCULAR ACCESS Right  10/4/2017    Procedure: INSERT PORT VASCULAR ACCESS;  Port Placement;  Surgeon: Jc Goodman PA-C;  Location: [facility] OR     IRRIGATION AND DEBRIDEMENT BREAST  3/1/2012    Procedure:IRRIGATION AND DEBRIDEMENT BREAST; Irrigation and Debridement, Wound Closure Right Breast; Surgeon:JUNG GUILLEN; Location:[facility] OR     MASTECTOMY, RECONSTRUCT BREAST, COMBINED  1/30/2012    Procedure:COMBINED MASTECTOMY, RECONSTRUCT BREAST; Bilateral Mastectomies, Right Axillary [place] Node Biopsy, Bilateral Breast Reconstruction with Tissue Expanders, Reconstruction of inframammary fold, bilateral pain management systems; Surgeon:ANUPAM CAMPBELL; Location:[facility] OR     RECONSTRUCT BREAST  8/31/2012    Procedure: RECONSTRUCT BREAST;  Bilateral 2nd stage breast reconstruction, revision,       SOFT TISSUE SURGERY  1-30-12    Mastectomy w severe myofascial pain syndrome     THYROIDECTOMY  7/10/2013    Procedure: THYROIDECTOMY;  Total Thyroidectomy with central neck dissection;  Surgeon: Indiana Sneed MD;  Location: [facility] OR     TONSILLECTOMY      childhood     Current Outpatient Medications   Medication Sig Dispense Refill     ACAI RAMIREZ PO Take 50 mg by mouth       acetaminophen (TYLENOL) 325 MG tablet Take 2 tablets (650 mg) by mouth every 4 hours as needed for mild pain or fever 100 tablet      acyclovir (ZOVIRAX) 400 MG tablet Take 1 tablet (400 mg) by mouth 2 times daily 30 tablet 0     aluminum chloride (DRYSOL) 20 % external solution Apply topically At Bedtime 60 mL 3     azithromycin (ZITHROMAX) 250 MG tablet Two tablets first day, then one tablet daily for four days. 6 tablet 0     bisacodyl (DULCOLAX) 5 MG EC tablet Take 3 tablets (15 mg) by mouth daily as needed for constipation 30 tablet 0     calcitRIOL (ROCALTROL) 0.25 MCG capsule TAKE 2 CAPSULES BY MOUTH EVERY MORNING AND TAKE 1 CAPSULE BY MOUTH EVERY NIGHT AT BEDTIME 90 capsule 11     calcium carbonate antacid (TUMS ULTRA 1000) 1000 MG CHEW Take 1 tablet (1,000  mg) by mouth 4 times daily as needed (severe muscle cramps)       calcium citrate-vitamin D (CALCIUM CITRATE + D3) 315-250 MG-UNIT TABS per tablet Take 2 tablets by mouth 2 times daily       celecoxib (CELEBREX) 200 MG capsule Take 1 capsule (200 mg) by mouth 2 times daily 60 capsule 2     cetirizine (ZYRTEC ALLERGY) 10 MG tablet Take 1 tablet (10 mg) by mouth 3 times daily On hold for lab test. 90 tablet 0     Cholecalciferol (VITAMIN D3) 2000 units CAPS Take 5,000 Units by mouth daily  0     COMPOUND CONTAINING CONTROLLED SUBSTANCE (CMPD RX) - PHARMACY TO MIX COMPOUNDED MEDICATION Ketamine 6% + lidocaine 10% + gabapentin 5% in Lipo.  Apply small amount to affected area two times daily. 120 g 6     COMPOUND CONTAINING CONTROLLED SUBSTANCE (CMPD RX) - PHARMACY TO MIX COMPOUNDED MEDICATION COMPOUNDED ANALGESIC CREAM AMITRIPTYLINE 2% KETAMINE 6% GABAPENTIN 5% CLONIDINE 0.2% LIDOCAINE 10% VANICREAM 1 QSAD  Apply small amount to affected area two times daily. 120 g 6     cromolyn (OPTICROM) 4 % ophthalmic solution Place 1 drop into both eyes 4 times daily 10 mL 0     cromolyn sodium (NASALCROM) 5.2 MG/ACT AERS Inhaler SPRAY ONE SPRAY( 1 ML) IN NOSTRIL DAILY 1 Bottle 6     [employer] FIBER GUMMY BEARS CHILDREN CHEW Take 5 g by mouth daily (2 gummy= 5 g =1 serving) 120 tablet 3     cyclobenzaprine (FLEXERIL) 10 MG tablet Take 1 tablet (10 mg) by mouth 3 times daily as needed (Patient taking differently: Take 10 mg by mouth 3 times daily ) 30 tablet 0     diazepam (VALIUM) 5 MG tablet TAKE ONE TABLET ( 5 MG) BY MOUTH THREE TIMES DAILY AS NEEDED FOR MUSCLE SPASMS. FILL 10/08/2018 90 tablet 1     diclofenac (VOLTAREN) 1 % GEL topical gel Apply affected area two times daily PRN using enclosed dosing card. 100 g 3     EPINEPHrine (EPIPEN 2-CARIDAD) 0.3 MG/0.3ML injection 2-pack Inject 0.3 mLs (0.3 mg) into the muscle once as needed for anaphylaxis 0.6 mL 3     fluticasone (FLONASE) 50 MCG/ACT spray Spray 1-2 sprays into both nostrils  daily 1 Bottle 11     furosemide (LASIX) 20 MG tablet Take 1 tablet (20 mg) by mouth 2 times daily 90 tablet 3     gabapentin (NEURONTIN) 100 MG capsule Take 1 capsule (100 mg) by mouth 3 times daily Wean off if you experience side effects. 90 capsule 3     levofloxacin (LEVAQUIN) 750 MG tablet Take 1 tablet (750 mg) by mouth daily 10 tablet 0     levothyroxine (SYNTHROID/LEVOTHROID) 112 MCG tablet ON MONDAY THRU SATURDAY TAKE TWO TABLETS DAILY AND ON SUNDAY TAKE 3 TABLETS DAILY. 189 tablet 3     lidocaine (XYLOCAINE) 5 % ointment Apply quarter size amount to chest and back up to 3 times daily as needed for pain. 350 g 1     lidocaine-prilocaine (EMLA) cream Apply topically as needed for moderate pain 60 g 1     lidocaine-prilocaine (EMLA) cream Apply topically as needed (port access pain.) 30 g 1     magic mouthwash (FIRST-MOUTHWASH BLM) suspension Swish and spit 5-10 mLs in mouth every 6 hours as needed for mouth sores 237 mL 1     magic mouthwash suspension (diphenhydrAMINE, lidocaine, aluminum-magnesium & simethicone) Swish and spit 10 mLs in mouth every 6 hours as needed for mouth sores 360 mL 1     magnesium 200 MG TABS Take 200 mg by mouth 3 times daily       methocarbamol (ROBAXIN) 500 MG tablet Take 2 tablets (1,000 mg) by mouth 4 times daily as needed for muscle spasms 90 tablet 1     methocarbamol (ROBAXIN) 750 MG tablet Take 1 tablet (750 mg) by mouth 4 times daily . Ok to take a 5th Robaxin on very severe days. 120 tablet 3     montelukast (SINGULAIR) 10 MG tablet Take 2 tablets (20 mg) by mouth 2 times daily 120 tablet 11     morphine (MS CONTIN) 30 MG CR tablet Take 1 tablet (30 mg) by mouth every 8 hours . Take an addition pill at night such that your evening dose is 60mg (Patient taking differently: Take 30 mg by mouth every 6 hours . Take an addition pill at night such that your evening dose is 60mg) 120 tablet 0     naloxone (NARCAN) nasal spray Spray 1 spray (4 mg) into one nostril alternating  nostrils as needed for opioid reversal every 2-3 minutes until assistance arrives 0.2 mL 0     olopatadine (PATANOL) 0.1 % ophthalmic solution Apply 1 drop to eye       ondansetron (ZOFRAN-ODT) 8 MG ODT tab Take 1 tablet (8 mg) by mouth every 8 hours as needed 30 tablet 1     ondansetron (ZOFRAN-ODT) 8 MG ODT tab DISSOLVE ONE TABLET IN MOUTH EVERY EIGHT HOURS AS NEEDED  30 tablet 3     order for DME Compression socks - knee high 20-30 mmHg zippered if possible 1 Units 11     order for DME Equipment being ordered: compression stockings. 20-30 mm or 30 - 40 mm as patient can tolerate. Physical therapy to determine if they should be above or below the knee. 2 Units 4     order for DME Equipment being ordered: compression bra 2 Device 1     order for DME Compression stockings, medium grade, please measure and fit. Please dispense a pair. 1 Units 0     oxyCODONE IR (ROXICODONE) 30 MG tablet Take 1 tablet (30 mg) by mouth every 4 hours as needed for moderate to severe pain . This is a 30day supply 132 tablet 0     polyethylene glycol (MIRALAX) powder Take 17 g (1 capful) by mouth daily 510 g 0     potassium chloride SA (KLOR-CON) 20 MEQ CR tablet Take 1 tablet (20 mEq) by mouth 2 times daily 60 tablet 3     Probiotic Product (PROBIOTIC DAILY PO) Take 1 capsule by mouth daily Lacto acid bifidobacterium       prochlorperazine (COMPAZINE) 10 MG tablet Take 10 mg by mouth as needed  3     ranitidine (ZANTAC) 75 MG tablet Take 1 tablet (75 mg) by mouth 3 times daily 90 tablet 0     senna-docusate (SENOKOT-S;PERICOLACE) 8.6-50 MG per tablet Take 1-2 tablets by mouth 2 times daily as needed for constipation 60 tablet 0     SUMAtriptan (IMITREX) 50 MG tablet Take 1 tablet (50 mg) by mouth as needed 30 tablet 3     tamoxifen (NOLVADEX) 20 MG tablet Take 1 tablet (20 mg) by mouth daily 90 tablet 3     triamcinolone (KENALOG) 0.025 % ointment Apply topically as needed  3     Triamcinolone Acetonide (AZMACORT IN) Inhale 2 puffs into  the lungs as needed       UNABLE TO FIND Take 2 capsules by mouth 3 times daily Muscle Mag. 2 caps contain B1 20mg, B2 20mg, B6 10mg, magesium 20mg, manganese 2mg.       UNABLE TO FIND 3 tablets 3 times daily MEDICATION NAME: calcium D-Glucarate   3 caps contain 180mg of elemental calcium.       UNABLE TO FIND 2 tablets 3 times daily MEDICATION NAME: Digestzymes        UNABLE TO FIND 1 tablet daily MEDICATION NAME: Pure Encapsulations       VENTOLIN  (90 BASE) MCG/ACT Inhaler INHALE 2 PUFFS INTO THE LUNGS EVERY 4 HOURS AS NEEDED FOR SHORTNESS OF BREATH OR DIFFICULT BREATHING OR WHEEZING 18 g 3     OTC products: None, except as noted above    Allergies   Allergen Reactions     Baclofen Other (See Comments) and Unknown     Other reaction(s): Edema, chest pain, seizures.      Duloxetine Anaphylaxis and Other (See Comments)     Flushing, tremor/muscle twitching and edema  Serotonin syndrome     No Clinical Screening - See Comments Shortness Of Breath, Palpitations, Anaphylaxis, Itching, Swelling, Difficulty breathing and Rash     Sukhjinder wipes- oral allergy -  July 2015: throat tightness from a Chinese herbal medicine Wilmer Tran     Serotonin Anxiety, Other (See Comments) and Swelling     Seizures      Tree Nuts [Nuts] Shortness Of Breath     Amitriptyline Hcl Swelling     Birch Trees      Potatoes, carrots, cherries, celery, apple, pears, plums, peaches, parsnip, kiwi, hazelnuts, and apricots,      Blue Dyes Itching     Headaches       Cymbalta Other (See Comments)     Flushing, tremor/muscle twitching and edema     Gabapentin Other (See Comments)     edema  Systemic edema, weaned off from Feb to March per Dr. Dowd.    edema     Grass      Mugwort [Artemisia Vulgaris]      Various spices     Ragweeds      Melons, bananas, cucumbers, zucchini.     Topamax      Nortriptyline Itching, Visual Disturbance, Swelling, GI Disturbance, Anxiety, Other (See Comments) and Nausea     Other reaction(s): Swelling       Latex Allergy: NO    Social History     Tobacco Use     Smoking status: Never Smoker     Smokeless tobacco: Never Used     Tobacco comment: no 2nd hand smoke exposure   Substance Use Topics     Alcohol use: No     Comment: rare     History   Drug Use No       REVIEW OF SYSTEMS:   CONSTITUTIONAL: NEGATIVE for fever, chills, change in weight  INTEGUMENTARY/SKIN: NEGATIVE for worrisome rashes, moles or lesions  EYES: NEGATIVE for vision changes or irritation  ENT/MOUTH: NEGATIVE for ear, mouth and throat problems  RESP: NEGATIVE for significant cough or SOB  BREAST: NEGATIVE for masses, tenderness or discharge. See HPI above.   CV: NEGATIVE for chest pain, palpitations or peripheral edema. POSITIVE chest pain. See HPI above.   GI: NEGATIVE for abdominal pain, heartburn, or change in bowel habits. POSITIVE nausea. See HPI above.   : NEGATIVE for frequency, dysuria, or hematuria  MUSCULOSKELETAL: NEGATIVE for significant arthralgias or myalgia  NEURO: NEGATIVE for weakness, dizziness or paresthesias. See HPI above.   ENDOCRINE: NEGATIVE for temperature intolerance, skin/hair changes  HEME: NEGATIVE for bleeding problems  PSYCHIATRIC: NEGATIVE for changes in mood or affect    This document serves as a record of the services and decisions personally performed and made by Feroz Celeste MD. It was created on his behalf by Anne Murphy, a trained medical scribe. The creation of this document is based on the provider's statements to the medical scribe.  Anne Murphy 3:09 PM December 17, 2018    EXAM:   BP 98/60   Pulse 112   Temp 98.8  F (37.1  C) (Oral)   Resp 18   Wt 83 kg (183 lb)   SpO2 95%   BMI 30.45 kg/m      GENERAL APPEARANCE: healthy, alert and no distress     EYES: EOMI, PERRL     HENT: ear canals and TM's normal and nose and mouth without ulcers or lesions     NECK: no adenopathy, no asymmetry, masses, or scars and thyroid normal to palpation     RESP: lungs clear to auscultation - no rales, rhonchi  or wheezes     CV: regular rates and rhythm, normal S1 S2, no S3 or S4 and no murmur, click or rub     ABDOMEN:  soft, nontender, no HSM or masses and bowel sounds normal     MS: extremities normal- no gross deformities noted, no evidence of inflammation in joints, FROM in all extremities.     SKIN: no suspicious lesions or rashes     NEURO: Normal strength and tone, sensory exam grossly normal, mentation intact and speech normal     PSYCH: mentation appears normal. and affect normal/bright     LYMPHATICS: No cervical adenopathy    DIAGNOSTICS:     Orders Placed This Encounter   Procedures     EKG 12-lead complete w/read - Clinics     EKG has normal rate and rhythm, normal axis, no signs of ischemia    Recent Labs   Lab Test 11/28/18  0649 09/04/18  1708 08/31/18  1726  12/28/17  1224  10/31/17  0540  01/21/13  0936   HGB 13.2 13.6 13.8   < > 9.9*   < > 8.3*   < > 12.9   * 158 183   < > 279   < > 223   < > 280   INR  --   --   --   --  0.99  --  1.03   < >  --      --  140   < > 141   < > 144   < >  --    POTASSIUM 4.0  --  3.9   < > 3.7   < > 3.5   < > 4.3   CR 0.84  --  1.11*   < > 0.96   < > 0.74   < >  --    A1C  --   --   --   --   --   --   --   --  5.5    < > = values in this interval not displayed.        IMPRESSION:   Reason for surgery/procedure: Removing of bilateral breast implants    The proposed surgical procedure is considered INTERMEDIATE risk.    REVISED CARDIAC RISK INDEX  The patient has the following serious cardiovascular risks for perioperative complications such as (MI, PE, VFib and 3  AV Block):  No serious cardiac risks  INTERPRETATION: 0 risks: Class I (very low risk - 0.4% complication rate)    The patient has the following additional risks for perioperative complications:  No identified additional risks      ICD-10-CM    1. Preop general physical exam Z01.818        RECOMMENDATIONS:         --Patient is to take all scheduled medications on the day of surgery EXCEPT for  modifications listed below.     Lasix skip morning of the surgery and Celebrex skip 1 week prior to the planned procedure     APPROVAL GIVEN to proceed with proposed procedure, without further diagnostic evaluation     The information in this document, created by the medical scribe for me, accurately reflects the services I personally performed and the decisions made by me. I have reviewed and approved this document for accuracy prior to leaving the patient care area.  December 17, 2018 3:10 PM    Signed Electronically by: Feroz Celeste MD    Copy of this evaluation report is provided to requesting physician.    [facility] Preop Guidelines    Revised Cardiac Risk Index

## 2018-12-17 NOTE — PATIENT INSTRUCTIONS
Skip Celebrex 5-7 days before procedure, skin Lasix the day before.     Before Your Surgery      Call your surgeon if there is any change in your health. This includes signs of a cold or flu (such as a sore throat, runny nose, cough, rash or fever).    Do not smoke, drink alcohol or take over the counter medicine (unless your surgeon or primary care doctor tells you to) for the 24 hours before and after surgery.    If you take prescribed drugs: Follow your doctor s orders about which medicines to take and which to stop until after surgery.    Eating and drinking prior to surgery: follow the instructions from your surgeon    Take a shower or bath the night before surgery. Use the soap your surgeon gave you to gently clean your skin. If you do not have soap from your surgeon, use your regular soap. Do not shave or scrub the surgery site.  Wear clean pajamas and have clean sheets on your bed.

## 2018-12-18 NOTE — TELEPHONE ENCOUNTER
Writer attempted to reach pt; no answer. LVM requesting a call back for an appt.   Two more attempts will be made.      Jian Salinas

## 2018-12-18 NOTE — TELEPHONE ENCOUNTER
Spoke with refer MD.  Hx of multiple cancer including breast cancer with mastectomy and reconstruction with ongoing myofascial pain in chest wall.    Pain continues with opioid treatment.  Unable to taper.

## 2018-12-19 RX ORDER — CYCLOBENZAPRINE HCL 5 MG
5-10 TABLET ORAL 3 TIMES DAILY PRN
Qty: 90 TABLET | Refills: 0 | Status: SHIPPED | OUTPATIENT
Start: 2018-12-19 | End: 2019-01-17

## 2018-12-19 NOTE — TELEPHONE ENCOUNTER
Called pt however there was no answer.  Left vm for her to return call to [facility] hotline.    Yury Hanks RN....12/19/2018 10:46 AM

## 2018-12-20 DIAGNOSIS — G89.28 OTHER CHRONIC POSTPROCEDURAL PAIN: ICD-10-CM

## 2018-12-20 DIAGNOSIS — Z79.891 ENCOUNTER FOR LONG-TERM OPIATE ANALGESIC USE: ICD-10-CM

## 2018-12-20 DIAGNOSIS — G89.3 NEOPLASM RELATED PAIN: ICD-10-CM

## 2018-12-20 NOTE — TELEPHONE ENCOUNTER
Patient notified of providers message as written.  Patient verbalized understanding, no further questions or concerns.    Sera Mtz RN

## 2018-12-20 NOTE — TELEPHONE ENCOUNTER
"Received VM from patient requesting refills of her pain meds. She reports in her VM that she cannot see the new pain specialist until the end of Jan and has surgery at the end of this month so she does not want a dose reduction in her pain medications.     Last refills: 11/28/2018  Last office visit: 11/28/2018  No follow up apt scheduled at this time.     Will route request to MD for review.     Reviewed [place]  Report.  -Note, 2 fills of norco from 2 different providers  12/12/2018 - #10  12/7/2018 - #12    Per taper plan, \"-reducing her oxycodone by 10% a month\"      "

## 2018-12-21 RX ORDER — OXYCODONE HYDROCHLORIDE 30 MG/1
30 TABLET ORAL EVERY 4 HOURS PRN
Qty: 107 TABLET | Refills: 0 | Status: SHIPPED | OUTPATIENT
Start: 2018-12-26 | End: 2018-12-21

## 2018-12-21 RX ORDER — OXYCODONE HYDROCHLORIDE 30 MG/1
30 TABLET ORAL EVERY 4 HOURS PRN
Qty: 119 TABLET | Refills: 0 | Status: SHIPPED | OUTPATIENT
Start: 2018-12-26 | End: 2019-01-16

## 2018-12-21 RX ORDER — MORPHINE SULFATE 30 MG/1
30 TABLET, FILM COATED, EXTENDED RELEASE ORAL EVERY 8 HOURS
Qty: 120 TABLET | Refills: 0 | Status: SHIPPED | OUTPATIENT
Start: 2018-12-26 | End: 2019-01-16

## 2018-12-29 ENCOUNTER — TRANSFERRED RECORDS (OUTPATIENT)
Dept: HEALTH INFORMATION MANAGEMENT | Facility: CLINIC | Age: 58
End: 2018-12-29

## 2019-01-02 NOTE — TELEPHONE ENCOUNTER
Pt is scheduled to see Dr. Shell on 1/15/19 @ 9 am.  Closing encounter, no further action needed.      Jian Salinas

## 2019-01-04 NOTE — PROGRESS NOTES
SUBJECTIVE:   Tisha Arias is a 58 year old female who presents to clinic today for the following health issues:      Musculoskeletal problem/pain      Duration: 1 month    Description  Location: Left shoulder    Intensity:  moderate    Accompanying signs and symptoms: none    History  Previous similar problem: no   Previous evaluation:  none    Precipitating or alleviating factors:  Trauma or overuse: no   Aggravating factors include: Any movement    Therapies tried and outcome: rest/inactivity, heat, ice, acetaminophen and tumeric, magnesium     Patient presents with chronic pain, particularly shoulder pain.  Patient had breast surgery to remove implants.  Implant capsule was found to be against the chest wall.  Patient had drains as well afterward.  Her pain spiked after drains were removed.  Shoulder pain hasn't gone away.    Problem list and histories reviewed & adjusted, as indicated.  Additional history: as documented    BP Readings from Last 3 Encounters:   01/07/19 126/72   12/17/18 98/60   12/11/18 126/66    Wt Readings from Last 3 Encounters:   01/07/19 82.6 kg (182 lb)   12/17/18 83 kg (183 lb)   11/28/18 80.3 kg (177 lb)                    Reviewed and updated as needed this visit by clinical staff  Tobacco  Allergies  Meds  Problems  Med Hx  Surg Hx  Fam Hx       Reviewed and updated as needed this visit by Provider  Tobacco  Allergies  Meds  Problems  Med Hx  Surg Hx  Fam Hx         ROS:  Constitutional, HEENT, cardiovascular, pulmonary, gi and gu systems are negative, except as otherwise noted.    OBJECTIVE:     /72   Pulse 105   Temp 98.5  F (36.9  C) (Oral)   Resp 18   Wt 82.6 kg (182 lb)   SpO2 99%   BMI 30.29 kg/m    Body mass index is 30.29 kg/m .  GENERAL: healthy, alert and no distress  EYES: Eyes grossly normal to inspection, PERRL and conjunctivae and sclerae normal  NECK: no adenopathy, no asymmetry, masses, or scars and thyroid normal to palpation  RESP:  lungs clear to auscultation - no rales, rhonchi or wheezes  CV: regular rate and rhythm, normal S1 S2, no S3 or S4, no murmur, click or rub, no peripheral edema and peripheral pulses strong  MS: bilateral shoulder weakness, normal rom, bilateral clavicle tenderness, AC joint tenderness, trapezius and rhomboid tenderness, mid-sternal tenderness, no gross musculoskeletal defects noted, no edema  SKIN: no suspicious lesions or rashes  NEURO: Normal strength and tone, mentation intact and speech normal  PSYCH: mentation appears normal, affect normal/bright    ASSESSMENT/PLAN:     1. Chronic pain syndrome  Exam is consistent with chronic pain syndrome vs pain radiation from chest wall surgery.  Recommend physical therapy, staying active/regular exercise, follow-up with pain med clinic, will restart colchicine as this has helped her in the past, continue heating pads as well.  - colchicine (COLCYRS) 0.6 MG tablet; Take 1 tablet (0.6 mg) by mouth 3 times daily  Dispense: 270 tablet; Refill: 3    Follow up if symptoms worsen or fail to improve.     Pablo Zarco MD  [facility]

## 2019-01-07 ENCOUNTER — OFFICE VISIT (OUTPATIENT)
Dept: FAMILY MEDICINE | Facility: CLINIC | Age: 59
End: 2019-01-07
Payer: COMMERCIAL

## 2019-01-07 VITALS
RESPIRATION RATE: 18 BRPM | TEMPERATURE: 98.5 F | HEART RATE: 105 BPM | WEIGHT: 182 LBS | DIASTOLIC BLOOD PRESSURE: 72 MMHG | SYSTOLIC BLOOD PRESSURE: 126 MMHG | BODY MASS INDEX: 30.29 KG/M2 | OXYGEN SATURATION: 99 %

## 2019-01-07 DIAGNOSIS — G89.4 CHRONIC PAIN SYNDROME: Primary | ICD-10-CM

## 2019-01-07 PROCEDURE — 99214 OFFICE O/P EST MOD 30 MIN: CPT | Performed by: FAMILY MEDICINE

## 2019-01-07 RX ORDER — COLCHICINE 0.6 MG/1
0.6 TABLET ORAL 3 TIMES DAILY
Qty: 270 TABLET | Refills: 3 | Status: SHIPPED | OUTPATIENT
Start: 2019-01-07 | End: 2020-04-06

## 2019-01-07 NOTE — LETTER
My Asthma Action Plan  Name: Tisha Arias   YOB: 1960  Date: 1/4/2019   My doctor: Pablo Zarco MD   My clinic: [facility]        My Control Medicine: Asthmacort  My Rescue Medicine: Albuterol (Proair/Ventolin/Proventil) inhaler .   My Asthma Severity: mild persistent  Avoid your asthma triggers: .               GREEN ZONE   Good Control    I feel good    No cough or wheeze    Can work, sleep and play without asthma symptoms       Take your asthma control medicine every day.     1. If exercise triggers your asthma, take your rescue medication    15 minutes before exercise or sports, and    During exercise if you have asthma symptoms  2. Spacer to use with inhaler: If you have a spacer, make sure to use it with your inhaler             YELLOW ZONE Getting Worse  I have ANY of these:    I do not feel good    Cough or wheeze    Chest feels tight    Wake up at night   1. Keep taking your Green Zone medications  2. Start taking your rescue medicine:    every 20 minutes for up to 1 hour. Then every 4 hours for 24-48 hours.  3. If you stay in the Yellow Zone for more than 12-24 hours, contact your doctor.  4. If you do not return to the Green Zone in 12-24 hours or you get worse, start taking your oral steroid medicine if prescribed by your provider.           RED ZONE Medical Alert - Get Help  I have ANY of these:    I feel awful    Medicine is not helping    Breathing getting harder    Trouble walking or talking    Nose opens wide to breathe       1. Take your rescue medicine NOW  2. If your provider has prescribed an oral steroid medicine, start taking it NOW  3. Call your doctor NOW  4. If you are still in the Red Zone after 20 minutes and you have not reached your doctor:    Take your rescue medicine again and    Call 911 or go to the emergency room right away    See your regular doctor within 2 weeks of an Emergency Room or Urgent Care visit for follow-up treatment.           Annual Reminders:  Meet with Asthma Educator,  Flu Shot in the Fall, consider Pneumonia Vaccination for patients with asthma (aged 19 and older).    Pharmacy:    [id number] - [place], [place] - [id number]  [facility] - [place], [place] - [address]                       Asthma Triggers  How To Control Things That Make Your Asthma Worse    Triggers are things that make your asthma worse.  Look at the list below to help you find your triggers and what you can do about them.  You can help prevent asthma flare-ups by staying away from your triggers.      Trigger                                                          What you can do   Cigarette Smoke  Tobacco smoke can make asthma worse. Do not allow smoking in your home, car or around you.  Be sure no one smokes at a child s day care or school.  If you smoke, ask your health care provider for ways to help you quit.  Ask family members to quit too.  Ask your health care provider for a referral to Quit Plan to help you quit smoking, or call 5-764-976-PLAN.     Colds, Flu, Bronchitis  These are common triggers of asthma. Wash your hands often.  Don t touch your eyes, nose or mouth.  Get a flu shot every year.     Dust Mites  These are tiny bugs that live in cloth or carpet. They are too small to see. Wash sheets and blankets in hot water every week.   Encase pillows and mattress in dust mite proof covers.  Avoid having carpet if you can. If you have carpet, vacuum weekly.   Use a dust mask and HEPA vacuum.   Pollen and Outdoor Mold  Some people are allergic to trees, grass, or weed pollen, or molds. Try to keep your windows closed.  Limit time out doors when pollen count is high.   Ask you health care provider about taking medicine during allergy season.     Animal Dander  Some people are allergic to skin flakes, urine or saliva from pets with fur or feathers. Keep pets with fur or feathers out of your home.    If you can t  keep the pet outdoors, then keep the pet out of your bedroom.  Keep the bedroom door closed.  Keep pets off cloth furniture and away from stuffed toys.     Mice, Rats, and Cockroaches  Some people are allergic to the waste from these pests.   Cover food and garbage.  Clean up spills and food crumbs.  Store grease in the refrigerator.   Keep food out of the bedroom.   Indoor Mold  This can be a trigger if your home has high moisture. Fix leaking faucets, pipes, or other sources of water.   Clean moldy surfaces.  Dehumidify basement if it is damp and smelly.   Smoke, Strong Odors, and Sprays  These can reduce air quality. Stay away from strong odors and sprays, such as perfume, powder, hair spray, paints, smoke incense, paint, cleaning products, candles and new carpet.   Exercise or Sports  Some people with asthma have this trigger. Be active!  Ask your doctor about taking medicine before sports or exercise to prevent symptoms.    Warm up for 5-10 minutes before and after sports or exercise.     Other Triggers of Asthma  Cold air:  Cover your nose and mouth with a scarf.  Sometimes laughing or crying can be a trigger.  Some medicines and food can trigger asthma.

## 2019-01-15 ENCOUNTER — OFFICE VISIT (OUTPATIENT)
Dept: ADDICTION MEDICINE | Facility: CLINIC | Age: 59
End: 2019-01-15
Payer: COMMERCIAL

## 2019-01-15 ENCOUNTER — TELEPHONE (OUTPATIENT)
Dept: PALLIATIVE MEDICINE | Facility: CLINIC | Age: 59
End: 2019-01-15

## 2019-01-15 VITALS
TEMPERATURE: 98.6 F | SYSTOLIC BLOOD PRESSURE: 131 MMHG | DIASTOLIC BLOOD PRESSURE: 86 MMHG | HEART RATE: 78 BPM | OXYGEN SATURATION: 98 %

## 2019-01-15 DIAGNOSIS — R07.89 CHEST WALL PAIN: ICD-10-CM

## 2019-01-15 DIAGNOSIS — G89.3 NEOPLASM RELATED PAIN: ICD-10-CM

## 2019-01-15 DIAGNOSIS — C85.10 HIGH GRADE B-CELL LYMPHOMA (H): ICD-10-CM

## 2019-01-15 DIAGNOSIS — Z79.891 ENCOUNTER FOR LONG-TERM OPIATE ANALGESIC USE: ICD-10-CM

## 2019-01-15 DIAGNOSIS — Z85.3 PERSONAL HISTORY OF MALIGNANT NEOPLASM OF BREAST: ICD-10-CM

## 2019-01-15 DIAGNOSIS — Z98.84 STATUS POST BARIATRIC SURGERY: ICD-10-CM

## 2019-01-15 DIAGNOSIS — G89.28 OTHER CHRONIC POSTPROCEDURAL PAIN: ICD-10-CM

## 2019-01-15 DIAGNOSIS — F11.20 UNCOMPLICATED OPIOID DEPENDENCE (H): Primary | ICD-10-CM

## 2019-01-15 PROCEDURE — 99205 OFFICE O/P NEW HI 60 MIN: CPT | Performed by: PEDIATRICS

## 2019-01-15 ASSESSMENT — PAIN SCALES - GENERAL: PAINLEVEL: EXTREME PAIN (8)

## 2019-01-15 NOTE — PROGRESS NOTES
[facility] Consultation    Date of visit: 1/17/2019    Reason for consultation:    Tisha Arias is a 58 year old female who is seen in consultation today at the request of her primary care physician, Pablo Soto, palliative care physician, Dr. Benitez and addiction medicine physician Dr. Shell.     Consultation and Evaluation for: Consideration of Suboxone MAT for chronic opioid induced hyperalgesia and consideration of possible interventional procedures for chest wall pain.     Review of [place] Prescription Monitoring Program (): Today I have also reviewed the patient's history of controlled substance use, as provided by [place] licensed pharmacies and prescriber dispensers. YES, concerning for the amount of medications she is on. She is on 15mg valium, 120mg oxycodone and 120mg.  She is currently on 300 morphine equivalents with concurrent benzodiazepine use.     Review of Pain Questionnaire: Please see the [facility] health questionnaire, which the patient completed and reviewed with me in detail.    Review of Electronic Chart: Today I have also reviewed available medical information in the patient's medical record at [facility] ([facility]), including relevant provider notes, laboratory work, and imaging.     Tisha Arias has been seen at multiple pain clinics in the past. [place] [facility], [facility],  Dr England in 2016, janis and Dr. Dowd and recent consult with [facility] pain clinic Dr. Mccray where she reports she was advised to get out of the [facility] system because she was not being treated fairly and never would be.  I did not see this in his note.     Recent referral to [facility] on 12/11/2018 which she has not attended as they were trying to get her records prior to scheduling initial patient consult.     Chief Complaint:    Chief Complaint   Patient presents  with     Pain       Pain history:  Tisha Arias is a 58 year old female who presents for initial evaluation of chief pain complaint of chronic pain.     Her primary pain complaint is severe chest pain that wraps around to her back.  It feels like she cannot breath at times and like someone is sitting on her chest.  Secondary pain complaints include bilateral hip pain.     She has an extensive cancer history including breast cancer, thyroid cancer and B-cell lymphoma.     She was initially put on chronic opioids after having a bilateral mastectomy in January of 2012 for breast cancer which is now in remission.  She developed postmastectomy pain syndrome or myofascial chest wall pain after the surgery.  She was stable on 6 percocet a day for about a year and then was put on Gabapentin and her pain got much worse.  She attributes all of her neuropathic pain to being put on this medication and is very angry with Dr. Dowd for starting this.  She was also put on topamax, cymbalta and elavil at the time and developed serotonin syndrome in June of 2013.  After this she went to [facility] and was taken off her polypharmacy but continued on opioids.  She followed with the [place] pain clinic until 2014 at which point her opioid prescribing was switched to palliative care and Dr. Benitez with whom she is still with.      She was diagnosed with lymphoma in August of 2017 and her pain increased.  Her opioids were hesitantly ramped up at this time by Dr. Mcghee after a recommendation by Dr. England for modest opioid increases.   She was treated with intrathecal chemotherapy which finished in January of 2018.  She recently underwent bilateral removal of breast implants on 12/29/2018 secondary to contractures.  This surgery did not help with her pain.     Her pain continued to get worse and worse despite increasing doses and Dr. Benitez began an opioid taper in September of 2018 secondary to opioid induced  hyperalgesia.  She has reduced from 180mg oxycodone daily to current amount of 120mg daily. Morphine has remained the same at 120mg daily.  She admits to overuse of her medications because of her pain and inability to taper down/off.      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 Prior to this she had never missed more than a couple weeks. She hopes to return to work in March of 2019 but is also applying for long term disability as a back up plan.     She lives alone and has two grown children who live nearby who are supportive of her.        Quality: constant, sharp, stabbing, aching, heavy electric burning and throbbing  Severity/Intensity: 15/10 at worst, 8-10/10 at best, 10-15/10 on average  Aggravating factors include: sitting, movement, driving  Relieving factors include: laying down and being completely still  Red Flags: The patient denies bowel or bladder incontinence, parasthesias, weakness, saddle anesthesia, unintentional weight loss, or fever/chills/sweats.       Pain Treatments:  1. Medications:       Current pain medications:   Morphine 30mg #4/day - 120 ME   Oxycodone 30mg #4/day - 180 ME   Valium 5mg TID for muscle spasms   Ketamine 6% % lidocaine 1% compounded cream   Voltaren gel PRN   Flexeril 5-10mg TID PRN   Celebrex 200mg BID PRN         Previous pain medications:    2. Physical Therapy: YES, helpful in the past     TENS unit: YES, she is out of patches now  3. Pain psychology: tried it - no help  4. Injections: none  5. Alternative Therapies:    Reiki and healing touch helpful, biofeedback not helpful   Chiropractic: helpful    Acupuncture: helpful      Opioid Use Disorder: Diagnostic Criteria  A problematic pattern of opioid use leading to clinically significant impairment or distress, as manifested by at least two of the following, occurring within a 12-month period:  OPIOID USE DISORDER   she admits to taking  larger amounts than initially intended  she admits to unsuccessful efforts to cut down or control use  she admits to spending a great deal of time in activities necessary to obtain, use and recover from opioids  she denies cravings or a strong desire to use   she admits to failure to fulfill obligations at work, school or home  she denies continued use despite negative consequences  she denies giving up important activities to use  she denies use in situations in which it is physically dangerous  she admits to tolerance (in the setting of at lease 2 of above)    Tisha Arias has experienced the following withdrawal symptoms in the past:     sweating, shakes/tremors, agitation, problems with sleep, muscle aches, sweating, nausea/vomitting, diarrhea, loss of appetite, and goose bumps.     Tisha Arias meets 6 of the above criteria thus meeting the diagnosis for Opioid Use Disorder     Diagnostic tests:  CT of the chest/abdomen and pelvis was completed on 9/7/2018:                                                                   Impression:   In this patient with a history of diffuse large B-cell lymphoma status  post chemotherapy, breast cancer status post bilateral mastectomy, and  thyroid cancer status post thyroidectomy:  1. Surgical changes as above without convincing evidence of recurrent  or metastatic disease in the chest abdomen and pelvis.  2. Unchanged intrahepatic and extrahepatic biliary ductal dilation,  presumably reservoir effect status post cholecystectomy.  3. Increased size of nonobstructing right and left renal stones.      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 No real need for Epipen for this alone.     GDM (gestational diabetes mellitus)     w first pregnancy only     Gestational hypertension      Lymphedema     jackson arms, chest     Migraine      Neuropathy associated with cancer (H)      Papillary carcinoma, follicular variant (H) 2013    pT3, N1, Mx, thyroid     Post-surgical hypothyroidism      Renal disease     kidney stones     Rhinitis, allergic to other allergen      Right Breast mass and microcalcifications 2011     Seasonal allergic conjunctivitis      Seasonal allergic rhinitis 11 skin tests pos. for: dog/M/T/G/W--NEGATIVE FOOD TESTS FOR: shrimp, crab, lobster, coconut       Past Surgical History:  Past Surgical History:   Procedure Laterality Date     BACK SURGERY  ,    Bulging disc w nerve root impigment     BIOPSY BREAST      right     BIOPSY BREAST  11    right, core and sterotactic     BONE MARROW BIOPSY, BONE SPECIMEN, NEEDLE/TROCAR Left 10/3/2017    Procedure: BIOPSY BONE MARROW;  Bone Marrow Biopsy with aspirate;  Surgeon: Amelia Watkins PA-C;  Location: [facility] OR     BYPASS GASTRIC, CHOLECYSTECTOMY, COMBINED       C LAPAROSCOPIC GASTRIC RESTRICTIVE PX, W/GASTRIC BYPASS/ GAMALIEL-EN-Y, < 150CM      Gamaliel en Y?      SECTION       COLONOSCOPY      normal     COSMETIC SURGERY  2012    Final Stage of Mastectomy     DAVINCI HYSTERECTOMY TOTAL, BILATERAL SALPINGO-OOPHORECTOMY, COMBINED  2012    Procedure: COMBINED DAVINCI HYSTERECTOMY TOTAL, SALPINGO-OOPHORECTOMY;  Davinci Total Laparoscopic Hysterectomy, Bilateral Salpingo Oophorectomy, Pelvic Washings, Cystoscopy;  Surgeon: Evie Sheikh MD;  Location: [facility] OR     ENT SURGERY       ESOPHAGOSCOPY,  GASTROSCOPY, DUODENOSCOPY (EGD), COMBINED N/A 9/14/2017    Procedure: COMBINED ENDOSCOPIC ULTRASOUND, ESOPHAGOSCOPY, GASTROSCOPY, DUODENOSCOPY (EGD), FINE NEEDLE ASPIRATE/BIOPSY;  Esophagogastroduodenoscopy, Endoscopic Ultrasound, with fine needle biopsy aspirate;  Surgeon: Patrick Robles MD;  Location: [facility] OR     GI SURGERY  11-    Rachael-en Y w cholecystectomy     GYN SURGERY  1/6/89,1/10/92    2 c-sections     HYSTERECTOMY, PAP NO LONGER INDICATED  12/12    DeVinci assister lap hyst with BSO     INSERT PORT VASCULAR ACCESS Right 10/4/2017    Procedure: INSERT PORT VASCULAR ACCESS;  Port Placement;  Surgeon: Jc Goodman PA-C;  Location: [facility] OR     IRRIGATION AND DEBRIDEMENT BREAST  3/1/2012    Procedure:IRRIGATION AND DEBRIDEMENT BREAST; Irrigation and Debridement, Wound Closure Right Breast; Surgeon:JUNG GUILLEN; Location:[facility] OR     MASTECTOMY, RECONSTRUCT BREAST, COMBINED  1/30/2012    Procedure:COMBINED MASTECTOMY, RECONSTRUCT BREAST; Bilateral Mastectomies, Right Axillary [place] Node Biopsy, Bilateral Breast Reconstruction with Tissue Expanders, Reconstruction of inframammary fold, bilateral pain management systems; Surgeon:ANUPAM CAMPBELL; Location:[facility] OR     RECONSTRUCT BREAST  8/31/2012    Procedure: RECONSTRUCT BREAST;  Bilateral 2nd stage breast reconstruction, revision,       SOFT TISSUE SURGERY  1-30-12    Mastectomy w severe myofascial pain syndrome     THYROIDECTOMY  7/10/2013    Procedure: THYROIDECTOMY;  Total Thyroidectomy with central neck dissection;  Surgeon: Indiana Sneed MD;  Location: [facility] OR     TONSILLECTOMY      childhood       Medications:  Current Outpatient Medications   Medication Sig Dispense Refill     ACASHAHNAZ RAMIREZ PO Take 50 mg by mouth       acetaminophen (TYLENOL) 325 MG tablet Take 2 tablets (650 mg) by mouth every 4 hours as needed for mild pain or fever 100 tablet      acyclovir (ZOVIRAX) 400 MG tablet Take 1 tablet (400 mg) by mouth 2 times  daily (Patient not taking: Reported on 1/15/2019) 30 tablet 0     aluminum chloride (DRYSOL) 20 % external solution Apply topically At Bedtime 60 mL 3     azithromycin (ZITHROMAX) 250 MG tablet Two tablets first day, then one tablet daily for four days. (Patient not taking: Reported on 1/15/2019) 6 tablet 0     bisacodyl (DULCOLAX) 5 MG EC tablet Take 3 tablets (15 mg) by mouth daily as needed for constipation (Patient not taking: Reported on 1/15/2019) 30 tablet 0     calcitRIOL (ROCALTROL) 0.25 MCG capsule TAKE 2 CAPSULES BY MOUTH EVERY MORNING AND TAKE 1 CAPSULE BY MOUTH EVERY NIGHT AT BEDTIME 90 capsule 11     calcium carbonate antacid (TUMS ULTRA 1000) 1000 MG CHEW Take 1 tablet (1,000 mg) by mouth 4 times daily as needed (severe muscle cramps)       calcium citrate-vitamin D (CALCIUM CITRATE + D3) 315-250 MG-UNIT TABS per tablet Take 2 tablets by mouth 2 times daily       celecoxib (CELEBREX) 200 MG capsule Take 1 capsule (200 mg) by mouth 2 times daily 60 capsule 2     cetirizine (ZYRTEC ALLERGY) 10 MG tablet Take 1 tablet (10 mg) by mouth 3 times daily On hold for lab test. 90 tablet 0     Cholecalciferol (VITAMIN D3) 2000 units CAPS Take 5,000 Units by mouth daily  0     colchicine (COLCYRS) 0.6 MG tablet Take 1 tablet (0.6 mg) by mouth 3 times daily 270 tablet 3     cromolyn (OPTICROM) 4 % ophthalmic solution Place 1 drop into both eyes 4 times daily 10 mL 0     cromolyn sodium (NASALCROM) 5.2 MG/ACT AERS Inhaler SPRAY ONE SPRAY( 1 ML) IN NOSTRIL DAILY (Patient not taking: Reported on 1/15/2019) 1 Bottle 6     [employer] FIBER GUMMY BEARS CHILDREN CHEW Take 5 g by mouth daily (2 gummy= 5 g =1 serving) 120 tablet 3     cyclobenzaprine (FLEXERIL) 10 MG tablet Take 1 tablet (10 mg) by mouth 3 times daily as needed (Patient taking differently: Take 10 mg by mouth 3 times daily ) 30 tablet 0     cyclobenzaprine (FLEXERIL) 5 MG tablet Take 1-2 tablets (5-10 mg) by mouth 3 times daily as needed for muscle spasms 90  tablet 0     diazepam (VALIUM) 5 MG tablet TAKE ONE TABLET ( 5 MG) BY MOUTH THREE TIMES DAILY AS NEEDED FOR MUSCLE SPASMS. FILL 10/08/2018 90 tablet 1     diclofenac (VOLTAREN) 1 % GEL topical gel Apply affected area two times daily PRN using enclosed dosing card. 100 g 3     EPINEPHrine (EPIPEN 2-CARIDAD) 0.3 MG/0.3ML injection 2-pack Inject 0.3 mLs (0.3 mg) into the muscle once as needed for anaphylaxis (Patient not taking: Reported on 1/15/2019) 0.6 mL 3     fluticasone (FLONASE) 50 MCG/ACT spray Spray 1-2 sprays into both nostrils daily (Patient not taking: Reported on 1/15/2019) 1 Bottle 11     furosemide (LASIX) 20 MG tablet Take 1 tablet (20 mg) by mouth 2 times daily 90 tablet 3     levofloxacin (LEVAQUIN) 750 MG tablet Take 1 tablet (750 mg) by mouth daily (Patient not taking: Reported on 1/15/2019) 10 tablet 0     levothyroxine (SYNTHROID/LEVOTHROID) 112 MCG tablet ON MONDAY THRU SATURDAY TAKE TWO TABLETS DAILY AND ON SUNDAY TAKE 3 TABLETS DAILY. 189 tablet 3     lidocaine (XYLOCAINE) 5 % ointment Apply quarter size amount to chest and back up to 3 times daily as needed for pain. 350 g 1     lidocaine-prilocaine (EMLA) cream Apply topically as needed for moderate pain 60 g 1     lidocaine-prilocaine (EMLA) cream Apply topically as needed (port access pain.) 30 g 1     magic mouthwash (FIRST-MOUTHWASH BLM) suspension Swish and spit 5-10 mLs in mouth every 6 hours as needed for mouth sores 237 mL 1     magic mouthwash suspension (diphenhydrAMINE, lidocaine, aluminum-magnesium & simethicone) Swish and spit 10 mLs in mouth every 6 hours as needed for mouth sores 360 mL 1     magnesium 200 MG TABS Take 200 mg by mouth 3 times daily       montelukast (SINGULAIR) 10 MG tablet Take 2 tablets (20 mg) by mouth 2 times daily 120 tablet 11     morphine (MS CONTIN) 30 MG CR tablet Take 1 tablet (30 mg) by mouth every 8 hours . Take an addition pill at night such that your evening dose is 60mg 120 tablet 0     naloxone  (NARCAN) nasal spray Spray 1 spray (4 mg) into one nostril alternating nostrils as needed for opioid reversal every 2-3 minutes until assistance arrives (Patient not taking: Reported on 1/15/2019) 0.2 mL 0     olopatadine (PATANOL) 0.1 % ophthalmic solution Apply 1 drop to eye       ondansetron (ZOFRAN-ODT) 8 MG ODT tab Take 1 tablet (8 mg) by mouth every 8 hours as needed 30 tablet 1     ondansetron (ZOFRAN-ODT) 8 MG ODT tab DISSOLVE ONE TABLET IN MOUTH EVERY EIGHT HOURS AS NEEDED  30 tablet 3     order for DME Compression socks - knee high 20-30 mmHg zippered if possible 1 Units 11     order for DME Equipment being ordered: compression stockings. 20-30 mm or 30 - 40 mm as patient can tolerate. Physical therapy to determine if they should be above or below the knee. 2 Units 4     order for DME Compression stockings, medium grade, please measure and fit. Please dispense a pair. (Patient not taking: Reported on 1/15/2019) 1 Units 0     oxyCODONE IR (ROXICODONE) 30 MG tablet Take 1 tablet (30 mg) by mouth every 4 hours as needed for moderate to severe pain . This is a 30day supply 119 tablet 0     polyethylene glycol (MIRALAX) powder Take 17 g (1 capful) by mouth daily (Patient not taking: Reported on 1/15/2019) 510 g 0     potassium chloride SA (KLOR-CON) 20 MEQ CR tablet Take 1 tablet (20 mEq) by mouth 2 times daily 60 tablet 3     Probiotic Product (PROBIOTIC DAILY PO) Take 1 capsule by mouth daily Lacto acid bifidobacterium       prochlorperazine (COMPAZINE) 10 MG tablet Take 10 mg by mouth as needed  3     ranitidine (ZANTAC) 75 MG tablet Take 1 tablet (75 mg) by mouth 3 times daily 90 tablet 0     senna-docusate (SENOKOT-S;PERICOLACE) 8.6-50 MG per tablet Take 1-2 tablets by mouth 2 times daily as needed for constipation (Patient not taking: Reported on 1/15/2019) 60 tablet 0     SUMAtriptan (IMITREX) 50 MG tablet Take 1 tablet (50 mg) by mouth as needed 30 tablet 3     tamoxifen (NOLVADEX) 20 MG tablet Take 1  "tablet (20 mg) by mouth daily 90 tablet 3     triamcinolone (KENALOG) 0.025 % ointment Apply topically as needed  3     Triamcinolone Acetonide (AZMACORT IN) Inhale 2 puffs into the lungs as needed       UNABLE TO FIND Take 2 capsules by mouth 3 times daily Muscle Mag. 2 caps contain B1 20mg, B2 20mg, B6 10mg, magesium 20mg, manganese 2mg.       UNABLE TO FIND 3 tablets 3 times daily MEDICATION NAME: calcium D-Glucarate   3 caps contain 180mg of elemental calcium.       UNABLE TO FIND 2 tablets 3 times daily MEDICATION NAME: Digestzymes        UNABLE TO FIND 1 tablet daily MEDICATION NAME: Pure Encapsulations       VENTOLIN  (90 BASE) MCG/ACT Inhaler INHALE 2 PUFFS INTO THE LUNGS EVERY 4 HOURS AS NEEDED FOR SHORTNESS OF BREATH OR DIFFICULT BREATHING OR WHEEZING 18 g 3       Allergies:     Allergies   Allergen Reactions     Baclofen Other (See Comments) and Unknown     Other reaction(s): Edema, chest pain, seizures.      Clonidine Other (See Comments)     \"Seizures\"     Duloxetine Anaphylaxis and Other (See Comments)     Flushing, tremor/muscle twitching and edema  Serotonin syndrome     No Clinical Screening - See Comments Shortness Of Breath, Palpitations, Anaphylaxis, Itching, Swelling, Difficulty breathing and Rash     Sukhjinder wipes- oral allergy -  July 2015: throat tightness from a Chinese herbal medicine Wilmer Tran     Robaxin [Methocarbamol] Other (See Comments)     Swelling and chest pain     Serotonin Anxiety, Other (See Comments) and Swelling     Seizures      Tree Nuts [Nuts] Shortness Of Breath     Amitriptyline Hcl Swelling     Birch Trees      Potatoes, carrots, cherries, celery, apple, pears, plums, peaches, parsnip, kiwi, hazelnuts, and apricots,      Blue Dyes Itching     Headaches       Cymbalta Other (See Comments)     Flushing, tremor/muscle twitching and edema     Gabapentin Other (See Comments)     edema  Systemic edema, weaned off from Feb to March per Dr. Dowd.    edema     " Grass      Mugwort [Artemisia Vulgaris]      Various spices     Ragweeds      Melons, bananas, cucumbers, zucchini.     Topamax      Nortriptyline Itching, Visual Disturbance, Swelling, GI Disturbance, Anxiety, Other (See Comments) and Nausea     Other reaction(s): Swelling       Family history:  Family History   Problem Relation Age of Onset     Allergies Mother      Arthritis Mother      Cancer Mother         uterine/uterine     Hypertension Mother         on HCTZ     Hyperlipidemia Mother      Cerebrovascular Disease Mother         s/p brain surgery     Breast Cancer Mother      Depression Mother      Anxiety Disorder Mother      Anesthesia Reaction Mother      Asthma Mother      Skin Cancer Mother      Heart Disease Father      Diabetes Father      Coronary Artery Disease Father      Hypertension Father      Hyperlipidemia Father      Cerebrovascular Disease Father         Multiple strokes     Obesity Father      Diabetes Brother      Depression Brother      Hypertension Brother      Cancer Maternal Grandfather         pancreatic cancer/stomach cancer     Prostate Cancer Maternal Grandfather         Prostrate and Bladder     Other Cancer Maternal Grandfather         Pancreatic 1988, Bladder 1977     Cancer Maternal Grandmother         uterine     Breast Cancer Maternal Grandmother      Skin Cancer Maternal Grandmother      Cancer Brother 57        pancreatic cancer     Diabetes Brother      Breast Cancer Cousin         Grand mothers sister     Other Cancer Brother         Stage 3 Pancreatic 2-2015     Depression Brother      Asthma Brother      Obesity Brother      Anesthesia Reaction Other         H/a, itching, drug interactions     Asthma Other      Cancer Brother         Pancreatic      Thyroid Disease No family hx of        Review of Systems:    POSTIVE IN BOLD  GENERAL: fever/chills, fatigue, general unwell feeling, weight gain/loss.  HEAD/EYES:  headache, dizziness, or vision changes.    EARS/NOSE/THROAT:   Nosebleeds, hearing loss, sinus infection, earache, tinnitus.  IMMUNE:  Allergies, cancer, immune deficiency, or infections.  SKIN:  Urticaria, rash, hives  HEME/Lymphatic:   anemia, easy bruising, easy bleeding.  RESPIRATORY:  cough, wheezing, or shortness of breath  CARDIOVASCULAR/Circulation:  Extremity edema, syncope, hypertension, tachycardia, or angina.  GASTROINTESTINAL:  abdominal pain, nausea/emesis, diarrhea, constipation,  hematochezia, or melena.  ENDOCRINE:  Diabetes, steroid use,  thyroid disease or osteoporosis.  MUSCULOSKELETAL: neck pain, back pain, arthralgia, arthritis, or gout. Chest and back pain  GENITOURINARY:  frequency, urgency, dysuria, difficulty voiding, hematuria or incontinence.  NEUROLOGIC:  weakness, numbness, paresthesias, seizure, tremor, stroke or memory loss.  PSYCHIATRIC:  depression, anxiety, stress, suicidal thoughts or mood swings.     Physical Exam:  Vitals:    01/17/19 0757   BP: 117/75   Pulse: 89   SpO2: 95%   Weight: 79.4 kg (175 lb)     Exam:  Constitutional: Well developed, well nourished, appears stated age.  HEENT: Head atraumatic, normocephalic. Eyes without conjunctival injection or jaundice.  Neck supple. No obvious neck masses.  Skin: No rash, lesions, or petechiae of exposed skin.   Extremities:  No notable edema.  Psychiatric/mental status: Alert, without lethargy or stupor. Speech fluent. Appropriate affect. Mood normal. Able to follow commands without difficulty.     Musculoskeletal exam:  Gait/Station/Posture:   Normal stance, arm swing, and stride; no antalgia or Trendelenburg  Normal bulk and tone.      Cervical spine:  Range of motion within normal limits     Neurologic exam:  CN:  Cranial nerves 2-12 are grossly intact      Assessment:      Chronic post-mastectomy chest pain which wraps around to her back.  Etiology likely a combination of neuropathic pain and myofascial pain.     Chronic bilateral hip pain    Opioid dependence meeting criteria for OUD      Deconditioning & fear avoidance    Maladaptive coping strategies - chemically coping    Mental Health - the patient's mental health concerns, specifically her anxiety over her pain, affect her experience of pain and contribute to her clinically significant distress.      ICD-10-CM    1. Opioid-induced hyperalgesia R20.8     T40.2X5A    2. Neuropathic pain of chest M79.2    3. Chronic pain syndrome G89.4    4. Long term current use of opiate analgesic Z79.891    5. Uncomplicated opioid dependence (H) F11.20    6. Chest wall pain R07.89        Plan:  The following recommendations were given to the patient. Diagnosis, treatment options, risks, benefits, and alternatives were discussed, and all questions were answered. The patient expressed understanding of the plan for management.         1. Physical Therapy: Has completed in the past.  Could consider repeating once her mental health is more stable and pain is better controlled  2. Clinical Health Pain Psychologist: YES, in the future would recommend.    1. This type of therapy can help reduce physical and psychosocial triggers or reinforcers of pain by adapting thoughts, feelings and behaviors to reduce symptoms and increase quality of life.  Evidence indicates that the practice of relaxation, meditation, and mindfulness techniques can significantly affect pain levels and overall well being.  3. Diagnostic Studies: reviewed CT scan  4. Medication Management:   1. The majority of the visit was spent discussing her opioid pain medications. We discussed the use of chronic opioids for her chronic pain and why I think they are no longer indicated.  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  I believe her options at this point include continuation of her slow taper - either completely off or down to more reasonable doses (less than 90 ME) vs detox  and transition to Suboxone MAT.  I am recommending detox secondary to the fact that she is not tolerating the taper and is miserable currently with the decreases.  She is not a candidate for a home induction of Suboxone secondary to the large dose she is on.   I called the detox and put a bed on hold for her for today or tomorrow and she opted to go home over the weekend and discuss this with her family before she makes any decision.   I will call her on Monday to discuss whether or not she would like to proceed with detox.  If she is induced on suboxone at detox I would be happy to continue to provide suboxone MAT on an outpatient basis after.  I strongly believe she would do much better over time on this vs the high dose traditional opioids she is currently on.  However, she will need to tolerate an increase in her pain for a short time while her body readjusts from her current tolerance to opioids.  She would need to be able to be off all opioids for 12-24 hours before starting this and her pain would be worse during this time.     MEDICATIONS:   No orders of the defined types were placed in this encounter.    5.  Further procedures recommended:   1. I do not know of any procedure which can be done that will give her any sustained pain relief for her pain. She was very disappointed about this.  After the visit I discussed her case with my interventional pain colleagues who were in agreement.    6. Follow up: I will call her on Monday to discuss Suboxone MAT and detox      I spent 60 minutes of time face to face with the patient.  Greater than 50% of this time was spent in patient counseling and/or coordination of care regarding principles of multidisciplinary care, medication management, and treatment options as discussed above.         Helga MaMD Pain Management

## 2019-01-15 NOTE — TELEPHONE ENCOUNTER
Pain Management Center Referral      1. Confirmed address with patient? Yes  2. Confirmed phone number with patient? Yes  3. Confirmed referring provider? Yes  4. Is the PCP the same as the referring provider? Yes  5. Has the patient been to any previous pain clinics? Yes  (If yes, send WEND[facility] with welcome letter)  6. Which insurance are we to bill for this appointment?  [facility]    7. Informed pt of cancellation (48 hour) policy? Yes    REGARDING OPIOID MEDICATIONS: We will always address appropriateness of opioid pain medications, but we generally will not automatically take on a prescribing role. When we do take on prescribing of opioids for chronic pain, it is in collaboration with the referring physician for an intermediate period of time (months), with an expectation that the primary physician or provider will assume the prescribing role if medications are effective at stable doses with demonstrated compliance. Therefore, please do not assume that your prescribing responsibilities end on the day of pain clinic consultation.  8. Informed pt of prescribing policy? Yes    9.Please be aware that once you are established with a pain provider and location, you will need to continue have all future visits with that provider and location. It is best to determine what location is the most convenient for you and schedule with that one.    ** PATIENT INFORMED OF THIS POLICY Yes      9. Referring Provider: Lexus Shell    10. Criteria for Triage Eval:   -Missed/Failed 1st DUAL appointment? N/A   -Medication Focused? N/A   -Mental Health Concerns? (e.g. Recent psych hospitalization/snap shot)? N/A   -Active substance abuse? N/A   -Patient behaviors (e.g. Offensive language/raised voice)? N/A    Mark GAMBOA    [facility]

## 2019-01-16 DIAGNOSIS — G89.29 CHRONIC LOW BACK PAIN WITHOUT SCIATICA, UNSPECIFIED BACK PAIN LATERALITY: ICD-10-CM

## 2019-01-16 DIAGNOSIS — M54.50 CHRONIC LOW BACK PAIN WITHOUT SCIATICA, UNSPECIFIED BACK PAIN LATERALITY: ICD-10-CM

## 2019-01-16 RX ORDER — MORPHINE SULFATE 30 MG/1
30 TABLET, FILM COATED, EXTENDED RELEASE ORAL EVERY 8 HOURS
Qty: 120 TABLET | Refills: 0 | Status: SHIPPED | OUTPATIENT
Start: 2019-01-23 | End: 2019-02-20

## 2019-01-16 RX ORDER — OXYCODONE HYDROCHLORIDE 30 MG/1
30 TABLET ORAL EVERY 4 HOURS PRN
Qty: 108 TABLET | Refills: 0 | Status: SHIPPED | OUTPATIENT
Start: 2019-01-23 | End: 2019-02-20

## 2019-01-16 RX ORDER — DIAZEPAM 5 MG
TABLET ORAL
Qty: 90 TABLET | Refills: 1 | Status: SHIPPED | OUTPATIENT
Start: 2019-01-30 | End: 2019-03-20

## 2019-01-16 NOTE — TELEPHONE ENCOUNTER
Requested Prescriptions   Pending Prescriptions Disp Refills     cyclobenzaprine (FLEXERIL) 5 MG tablet  Last Written Prescription Date:  12/19/18  Last Fill Quantity: 90,  # refills: 0   Last office visit: 1/7/2019 with prescribing provider:  Charles   Future Office Visit:     90 tablet 0     Sig: Take 1-2 tablets (5-10 mg) by mouth 3 times daily as needed for muscle spasms    There is no refill protocol information for this order

## 2019-01-16 NOTE — TELEPHONE ENCOUNTER
Routing refill request to provider for review/approval because:  Drug not on the [facility] refill protocol       Mary Wilkerson RN - BC

## 2019-01-16 NOTE — TELEPHONE ENCOUNTER
"Received call from patient. She shares with me that her pain medication taper continues to go poorly. She will need refills soon. She also asks if the cmpd prescription can be authorized for a larger quantity as she tells me that she ran out of what she had prior to the month being up.     Last refill diazepam: 11/30/2018  Last refill oxycodone & morphine: 12/26/2018  Last refill cmpd cream:   Last office visit:  Scheduled for follow up     Will route to MD for review. Diazepam script to [facility], pain meds to [facility], and cmpd cream to [facility].     Reviewed  report.  -Note, norco from an MD out of our system  12/29/2018, #35 tabs    Per taper plan, \"-reducing her oxycodone by 10% a month\"  "

## 2019-01-17 ENCOUNTER — OFFICE VISIT (OUTPATIENT)
Dept: PALLIATIVE MEDICINE | Facility: CLINIC | Age: 59
End: 2019-01-17
Payer: COMMERCIAL

## 2019-01-17 VITALS
HEART RATE: 89 BPM | SYSTOLIC BLOOD PRESSURE: 117 MMHG | BODY MASS INDEX: 29.12 KG/M2 | WEIGHT: 175 LBS | OXYGEN SATURATION: 95 % | DIASTOLIC BLOOD PRESSURE: 75 MMHG

## 2019-01-17 DIAGNOSIS — F11.20 UNCOMPLICATED OPIOID DEPENDENCE (H): ICD-10-CM

## 2019-01-17 DIAGNOSIS — R20.8 OPIOID-INDUCED HYPERALGESIA: Primary | ICD-10-CM

## 2019-01-17 DIAGNOSIS — G89.4 CHRONIC PAIN SYNDROME: ICD-10-CM

## 2019-01-17 DIAGNOSIS — T40.2X5A OPIOID-INDUCED HYPERALGESIA: Primary | ICD-10-CM

## 2019-01-17 DIAGNOSIS — R07.89 CHEST WALL PAIN: ICD-10-CM

## 2019-01-17 DIAGNOSIS — M79.2 NEUROPATHIC PAIN OF CHEST: ICD-10-CM

## 2019-01-17 DIAGNOSIS — Z79.891 LONG TERM CURRENT USE OF OPIATE ANALGESIC: ICD-10-CM

## 2019-01-17 PROCEDURE — 99244 OFF/OP CNSLTJ NEW/EST MOD 40: CPT | Performed by: ANESTHESIOLOGY

## 2019-01-17 RX ORDER — CYCLOBENZAPRINE HCL 5 MG
5-10 TABLET ORAL 3 TIMES DAILY PRN
Qty: 90 TABLET | Refills: 0 | Status: SHIPPED | OUTPATIENT
Start: 2019-01-17 | End: 2019-02-18 | Stop reason: DRUGHIGH

## 2019-01-17 ASSESSMENT — PAIN SCALES - GENERAL: PAINLEVEL: EXTREME PAIN (8)

## 2019-01-17 NOTE — Clinical Note
I saw this very difficult complex patient today in my clinic in .  Please see my plan for a summary of our visit.  She was not very happy with me and left the visit crying and in general angry with me and all physicians in general.  However, I really believe that she would do MUCH better on suboxone.  I have a lot of patients in my practice that have a very similar history and have done very well on suboxone.

## 2019-01-17 NOTE — TELEPHONE ENCOUNTER
Patient called and requested to  paper copies of pain meds so she can bring them to a different pharmacy. Script obtained from [facility] pharmacy and locked in lockbox on first floor for patient . Called and advised this was done. Advised cmpd cream will remain at [facility].

## 2019-01-17 NOTE — PATIENT INSTRUCTIONS
I think you are suffering from opioid induced hyperalgesia and I am recommending detox and suboxone medication for your chronic pain.  This was discussed at length during todays appointment.  I will give you a call next week to discuss the [place] detox and transferring you over to Suboxone in the future.     To check the status of detox bed availability at [place] call:  [phone] (call at 0800 prior to going to the ER)      Helga Ma MD     ----------------------------------------------------------------  Clinic Number:  [phone]   Call this number with any questions about your care and for scheduling assistance. Calls are returned Monday through Friday between 8 AM and 4:30 PM. We usually get back to you within 2 business days depending on the issue/request.

## 2019-01-17 NOTE — PROGRESS NOTES
SUBJECTIVE:                                                        Tisha Arias is a 58 year old female who presents for  initial visit for addiction consultation and management referred by Dr. Celeste/  Palliative care MD. Emmanuel      [place] Board of Pharmacy Data Base Reviewed:    YES;   12/7/18 Hydrocodone 5/325mg #10   12/20 Ketamine  12/26 Oxycodone 30mg #119    12/26  MS ER 30mg #120  Dr. Clement  12/29 Hydrocodone 5mg/325mg  Dr. Jeffery  1/2/18 Valium 5 mg #90  1/7/18 Gabapentin 100mg #90      HPI:       Reason for refer:   Hx of multiple cancer including breast cancer with mastectomy and reconstruction with ongoing myofascial pain in chest wall.  Followed by Dr. Benitez Palliative care who has been recommending taper of opioid with which she is struggling.        1/30/2012  Dx Breast cancer with bilateral mastectomy. Since than has been having chest pain localized in several locations.   Was treated with Percocet 5/325 6 tab /day and Valium 5mg tid.  That continued until November 2012.    Seen [facility] Dr. Dowd started gabapentin, had swelling and chest pain- increased gabapentin and Baclofen.   Saw different MD tx with Topomax and stopped Gabapentin.  RX Clonidine and amytryptylline/Baclofen but was noted to have had tremors, possible seizures.   Was seen by Neurology (serotonin syndrome).  Out of work at that point for  for four months.   Rx Oxycodone 5mg q four hours up until dx Lymphoma 8/29/2017.  High dose chemo with Interthecal.  Pain increased.  Given MS during this time 30mg bid and 60mg q hs.  ([id number]).   Finished chemo 1/24/2018.    Post chemo pain was worse and has been ongoing and exacerbated by recent implant removal due to contractures.    Provider Started decrease 10/18 per pain med of Oxycodone from 6 tab /day to current 3 tab /day (current rx appears to be written for #120 suggesting 4/day)        Currently reports taking MS 30mg am and mid day and 60mg hs and  Oxycodone 5mg 3 x day.   Valium 5mg 3 x day.      Denies other hx of substance use.    PAST PSYCHIATRIC HISTORY     Patient denies any mental health sx other than as related to her pain.   Previous visit notes show she was accompanied to some visits by her therapist.     Patient Active Problem List    Diagnosis Date Noted     Long term current use of opiate analgesic 12/11/2018     Priority: Medium     Kidney stone 11/26/2018     Priority: Medium     Bilateral lower extremity edema 02/13/2018     Priority: Medium     Diffuse large B cell lymphoma (H) 01/18/2018     Priority: Medium     DLBCL (diffuse large B cell lymphoma) (H) 10/06/2017     Priority: Medium     High grade B-cell lymphoma (H) 09/26/2017     Priority: Medium     Personal history of malignant neoplasm of breast 01/23/2017     Priority: Medium     Status post bariatric surgery 11/02/2016     Priority: Medium     Hypocalcemia 11/02/2016     Priority: Medium     Migraine without status migrainosus, not intractable, unspecified migraine type 10/03/2016     Priority: Medium     Rash 08/07/2016     Priority: Medium     Intertrigo 08/03/2016     Priority: Medium     Chest wall pain 04/20/2016     Priority: Medium     Mild intermittent asthma without complication 12/21/2015     Priority: Medium     Idiopathic mast cell activation syndrome (H) 11/03/2015     Priority: Medium     Chronic pain disorder 11/03/2015     Priority: Medium     Vitamin D deficiency 08/21/2015     Priority: Medium     Chronic pain, post bilateral mastectomies and breast reconstruction  08/21/2015     Priority: Medium     Patient is followed by Palliative care for ongoing prescription of pain medication.  All refills should be approved by this provider, or covering partner.    Medication(s): morphine BID  Maximum quantity per month: Per palliative care   Clinic visit frequency required:       Controlled substance agreement on file: No    Pain Clinic evaluation in the past: Yes        Location(s):  [facility] Total Score(s):  No flowsheet data found.    Last [facility] website verification:  none   [email or web address]         Compound heterozygous MTHFR mutation [id number] (H) 04/13/2015     Priority: Medium     Metastasis to cervical lymph node (H) 09/26/2014     Priority: Medium     S/P breast reconstruction 05/28/2014     Priority: Medium     S/P thyroidectomy 07/10/2013     Priority: Medium     Postsurgical subclinical hypoparathyroidism (H) 07/10/2013     Priority: Medium     Postsurgical hypothyroidism 07/10/2013     Priority: Medium     Papillary carcinoma, follicular variant (H) 06/28/2013     Priority: Medium     Medication side effects 06/28/2013     Priority: Medium     Baclofen and topamax       Serotonin neurotoxicity 06/20/2013     Priority: Medium     Neuropathic pain of chest 05/30/2013     Priority: Medium     Atrophic vaginitis 05/06/2013     Priority: Medium     History of migraine headaches 05/01/2013     Priority: Medium     Intestinal malabsorption 05/01/2013     Priority: Medium     History of gastric bypass 05/01/2013     Priority: Medium     Lymphedema of breast 03/06/2013     Priority: Medium     Family history of uterine cancer 07/26/2012     Priority: Medium     Infection 03/06/2012     Priority: Medium     Cellulitis 03/06/2012     Priority: Medium     Breast cancer (H) 02/09/2012     Priority: Medium     DCIS, bilateral mastectomies, 1/2012       DCIS (ductal carcinoma in situ) of right breast 12/29/2011     Priority: Medium     CARDIOVASCULAR SCREENING; LDL GOAL LESS THAN 160 06/09/2011     Priority: Medium     Seasonal allergic rhinitis      Priority: Medium     Allergic rhinitis due to animal dander      Priority: Medium     Food intolerance      Priority: Medium       Problem list and histories reviewed & adjusted, as indicated.  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 No real need for Epipen for this alone.     GDM (gestational diabetes mellitus)     w first pregnancy only     Gestational hypertension      Lymphedema     jackson arms, chest     Migraine      Neuropathy associated with cancer (H)      Papillary carcinoma, follicular variant (H) 2013    pT3, N1, Mx, thyroid     Post-surgical hypothyroidism      Renal disease     kidney stones     Rhinitis, allergic to other allergen      Right Breast mass and microcalcifications 2011     Seasonal allergic conjunctivitis      Seasonal allergic rhinitis 11 skin tests pos. for: dog/M/T/G/W--NEGATIVE FOOD TESTS FOR: shrimp, crab, lobster, coconut       Past Surgical History:   Procedure Laterality Date     BACK SURGERY  ,    Bulging disc w nerve root impigment     BIOPSY BREAST      right     BIOPSY BREAST  11    right, core and sterotactic     BONE MARROW BIOPSY, BONE SPECIMEN, NEEDLE/TROCAR Left 10/3/2017    Procedure: BIOPSY BONE MARROW;  Bone Marrow Biopsy with aspirate;  Surgeon: Amelia Watkins PA-C;  [facility]     BYPASS GASTRIC, CHOLECYSTECTOMY, COMBINED       C LAPAROSCOPIC GASTRIC RESTRICTIVE PX, W/GASTRIC BYPASS/ GAMALIEL-EN-Y, < 150CM      Gamaliel en Y?      SECTION       COLONOSCOPY      normal     COSMETIC SURGERY  2012    Final Stage of Mastectomy     DAVINCI HYSTERECTOMY TOTAL, BILATERAL SALPINGO-OOPHORECTOMY, COMBINED  2012    Procedure: COMBINED DAVINCI HYSTERECTOMY TOTAL, SALPINGO-OOPHORECTOMY;  Davinci Total Laparoscopic Hysterectomy, Bilateral Salpingo Oophorectomy, Pelvic Washings, Cystoscopy;  Surgeon: Evie Sheikh MD;  Location: [facility] OR     ENT SURGERY        ESOPHAGOSCOPY, GASTROSCOPY, DUODENOSCOPY (EGD), COMBINED N/A 9/14/2017    Procedure: COMBINED ENDOSCOPIC ULTRASOUND, ESOPHAGOSCOPY, GASTROSCOPY, DUODENOSCOPY (EGD), FINE NEEDLE ASPIRATE/BIOPSY;  Esophagogastroduodenoscopy, Endoscopic Ultrasound, with fine needle biopsy aspirate;  Surgeon: Patrick Robles MD;  Location: [facility] OR     GI SURGERY  11-    Rachael-en Y w cholecystectomy     GYN SURGERY  1/6/89,1/10/92    2 c-sections     HYSTERECTOMY, PAP NO LONGER INDICATED  12/12    DeVinci assister lap hyst with BSO     INSERT PORT VASCULAR ACCESS Right 10/4/2017    Procedure: INSERT PORT VASCULAR ACCESS;  Port Placement;  Surgeon: Jc Goodman PA-C;  [facility]: [facility]     IRRIGATION AND DEBRIDEMENT BREAST  3/1/2012    Procedure:IRRIGATION AND DEBRIDEMENT BREAST; Irrigation and Debridement, Wound Closure Right Breast; Surgeon:JUNG GUILLEN; Location:[facility] OR     MASTECTOMY, RECONSTRUCT BREAST, COMBINED  1/30/2012    Procedure:COMBINED MASTECTOMY, RECONSTRUCT BREAST; Bilateral Mastectomies, Right Axillary Slovan Node Biopsy, Bilateral Breast Reconstruction with Tissue Expanders, Reconstruction of inframammary fold, bilateral pain management systems; Surgeon:ANUPAM CAMPBELL; Location[facility]     RECONSTRUCT BREAST  8/31/2012    Procedure: RECONSTRUCT BREAST;  Bilateral 2nd stage breast reconstruction, revision,       SOFT TISSUE SURGERY  1-30-12    Mastectomy w severe myofascial pain syndrome     THYROIDECTOMY  7/10/2013    Procedure: THYROIDECTOMY;  Total Thyroidectomy with central neck dissection;  Surgeon: Indiana Sneed MD;  Location: [facility] OR     TONSILLECTOMY      childhood         Family History   Problem Relation Age of Onset     Allergies Mother      Arthritis Mother      Cancer Mother         uterine/uterine     Hypertension Mother         on HCTZ     Hyperlipidemia Mother      Cerebrovascular Disease Mother         s/p brain surgery     Breast Cancer Mother      Depression Mother       Anxiety Disorder Mother      Anesthesia Reaction Mother      Asthma Mother      Skin Cancer Mother      Heart Disease Father      Diabetes Father      Coronary Artery Disease Father      Hypertension Father      Hyperlipidemia Father      Cerebrovascular Disease Father         Multiple strokes     Obesity Father      Diabetes Brother      Depression Brother      Hypertension Brother      Cancer Maternal Grandfather         pancreatic cancer/stomach cancer     Prostate Cancer Maternal Grandfather         Prostrate and Bladder     Other Cancer Maternal Grandfather         Pancreatic 1988, Bladder 1977     Cancer Maternal Grandmother         uterine     Breast Cancer Maternal Grandmother      Skin Cancer Maternal Grandmother      Cancer Brother 57        pancreatic cancer     Diabetes Brother      Breast Cancer Cousin         Grand mothers sister     Other Cancer Brother         Stage 3 Pancreatic 2-2015     Depression Brother      Asthma Brother      Obesity Brother      Anesthesia Reaction Other         H/a, itching, drug interactions     Asthma Other      Cancer Brother         Pancreatic      Thyroid Disease No family hx of          Social History     Social History Narrative        Stopped working at [employer] in Nov 2018.,  Currently on medical leave.  Afraid she will lose position if she can not return to work in March as planned.      Lives with .  Two adult children.  Granddaughter age 1              Current Outpatient Medications   Medication Sig Dispense Refill     ACAI RAMIREZ PO Take 50 mg by mouth       acetaminophen (TYLENOL) 325 MG tablet Take 2 tablets (650 mg) by mouth every 4 hours as needed for mild pain or fever 100 tablet      acyclovir (ZOVIRAX) 400 MG tablet Take 1 tablet (400 mg) by mouth 2 times daily 30 tablet 0     aluminum chloride (DRYSOL) 20 % external solution Apply topically At Bedtime 60 mL 3     azithromycin (ZITHROMAX) 250 MG tablet Two tablets first day,  then one tablet daily for four days. 6 tablet 0     bisacodyl (DULCOLAX) 5 MG EC tablet Take 3 tablets (15 mg) by mouth daily as needed for constipation 30 tablet 0     calcitRIOL (ROCALTROL) 0.25 MCG capsule TAKE 2 CAPSULES BY MOUTH EVERY MORNING AND TAKE 1 CAPSULE BY MOUTH EVERY NIGHT AT BEDTIME 90 capsule 11     calcium carbonate antacid (TUMS ULTRA 1000) 1000 MG CHEW Take 1 tablet (1,000 mg) by mouth 4 times daily as needed (severe muscle cramps)       calcium citrate-vitamin D (CALCIUM CITRATE + D3) 315-250 MG-UNIT TABS per tablet Take 2 tablets by mouth 2 times daily       celecoxib (CELEBREX) 200 MG capsule Take 1 capsule (200 mg) by mouth 2 times daily 60 capsule 2     cetirizine (ZYRTEC ALLERGY) 10 MG tablet Take 1 tablet (10 mg) by mouth 3 times daily On hold for lab test. 90 tablet 0     Cholecalciferol (VITAMIN D3) 2000 units CAPS Take 5,000 Units by mouth daily  0     colchicine (COLCYRS) 0.6 MG tablet Take 1 tablet (0.6 mg) by mouth 3 times daily 270 tablet 3     COMPOUND CONTAINING CONTROLLED SUBSTANCE (CMPD RX) - PHARMACY TO MIX COMPOUNDED MEDICATION Ketamine 6% + lidocaine 10% + gabapentin 5% in Lipo.  Apply small amount to affected area two times daily. 120 g 6     COMPOUND CONTAINING CONTROLLED SUBSTANCE (CMPD RX) - PHARMACY TO MIX COMPOUNDED MEDICATION COMPOUNDED ANALGESIC CREAM AMITRIPTYLINE 2% KETAMINE 6% GABAPENTIN 5% CLONIDINE 0.2% LIDOCAINE 10% VANICREAM 1 QSAD  Apply small amount to affected area two times daily. 120 g 6     cromolyn (OPTICROM) 4 % ophthalmic solution Place 1 drop into both eyes 4 times daily 10 mL 0     cromolyn sodium (NASALCROM) 5.2 MG/ACT AERS Inhaler SPRAY ONE SPRAY( 1 ML) IN NOSTRIL DAILY 1 Bottle 6     [employer] FIBER GUMMY BEARS CHILDREN CHEW Take 5 g by mouth daily (2 gummy= 5 g =1 serving) 120 tablet 3     cyclobenzaprine (FLEXERIL) 10 MG tablet Take 1 tablet (10 mg) by mouth 3 times daily as needed (Patient taking differently: Take 10 mg by mouth 3 times daily ) 30  tablet 0     cyclobenzaprine (FLEXERIL) 5 MG tablet Take 1-2 tablets (5-10 mg) by mouth 3 times daily as needed for muscle spasms 90 tablet 0     diazepam (VALIUM) 5 MG tablet TAKE ONE TABLET ( 5 MG) BY MOUTH THREE TIMES DAILY AS NEEDED FOR MUSCLE SPASMS. FILL 10/08/2018 90 tablet 1     diclofenac (VOLTAREN) 1 % GEL topical gel Apply affected area two times daily PRN using enclosed dosing card. 100 g 3     EPINEPHrine (EPIPEN 2-CARIDAD) 0.3 MG/0.3ML injection 2-pack Inject 0.3 mLs (0.3 mg) into the muscle once as needed for anaphylaxis 0.6 mL 3     fluticasone (FLONASE) 50 MCG/ACT spray Spray 1-2 sprays into both nostrils daily 1 Bottle 11     furosemide (LASIX) 20 MG tablet Take 1 tablet (20 mg) by mouth 2 times daily 90 tablet 3     levofloxacin (LEVAQUIN) 750 MG tablet Take 1 tablet (750 mg) by mouth daily 10 tablet 0     levothyroxine (SYNTHROID/LEVOTHROID) 112 MCG tablet ON MONDAY THRU SATURDAY TAKE TWO TABLETS DAILY AND ON SUNDAY TAKE 3 TABLETS DAILY. 189 tablet 3     lidocaine (XYLOCAINE) 5 % ointment Apply quarter size amount to chest and back up to 3 times daily as needed for pain. 350 g 1     lidocaine-prilocaine (EMLA) cream Apply topically as needed for moderate pain 60 g 1     lidocaine-prilocaine (EMLA) cream Apply topically as needed (port access pain.) 30 g 1     magic mouthwash (FIRST-MOUTHWASH BLM) suspension Swish and spit 5-10 mLs in mouth every 6 hours as needed for mouth sores 237 mL 1     magic mouthwash suspension (diphenhydrAMINE, lidocaine, aluminum-magnesium & simethicone) Swish and spit 10 mLs in mouth every 6 hours as needed for mouth sores 360 mL 1     magnesium 200 MG TABS Take 200 mg by mouth 3 times daily       methocarbamol (ROBAXIN) 750 MG tablet Take 1 tablet (750 mg) by mouth 4 times daily . Ok to take a 5th Robaxin on very severe days. 120 tablet 3     montelukast (SINGULAIR) 10 MG tablet Take 2 tablets (20 mg) by mouth 2 times daily 120 tablet 11     morphine (MS CONTIN) 30 MG CR  tablet Take 1 tablet (30 mg) by mouth every 8 hours . Take an addition pill at night such that your evening dose is 60mg 120 tablet 0     naloxone (NARCAN) nasal spray Spray 1 spray (4 mg) into one nostril alternating nostrils as needed for opioid reversal every 2-3 minutes until assistance arrives 0.2 mL 0     olopatadine (PATANOL) 0.1 % ophthalmic solution Apply 1 drop to eye       ondansetron (ZOFRAN-ODT) 8 MG ODT tab Take 1 tablet (8 mg) by mouth every 8 hours as needed 30 tablet 1     ondansetron (ZOFRAN-ODT) 8 MG ODT tab DISSOLVE ONE TABLET IN MOUTH EVERY EIGHT HOURS AS NEEDED  30 tablet 3     order for DME Compression socks - knee high 20-30 mmHg zippered if possible 1 Units 11     order for DME Equipment being ordered: compression stockings. 20-30 mm or 30 - 40 mm as patient can tolerate. Physical therapy to determine if they should be above or below the knee. 2 Units 4     order for DME Equipment being ordered: compression bra 2 Device 1     order for DME Compression stockings, medium grade, please measure and fit. Please dispense a pair. 1 Units 0     oxyCODONE IR (ROXICODONE) 30 MG tablet Take 1 tablet (30 mg) by mouth every 4 hours as needed for moderate to severe pain . This is a 30day supply 119 tablet 0     polyethylene glycol (MIRALAX) powder Take 17 g (1 capful) by mouth daily 510 g 0     potassium chloride SA (KLOR-CON) 20 MEQ CR tablet Take 1 tablet (20 mEq) by mouth 2 times daily 60 tablet 3     Probiotic Product (PROBIOTIC DAILY PO) Take 1 capsule by mouth daily Lacto acid bifidobacterium       prochlorperazine (COMPAZINE) 10 MG tablet Take 10 mg by mouth as needed  3     ranitidine (ZANTAC) 75 MG tablet Take 1 tablet (75 mg) by mouth 3 times daily 90 tablet 0     senna-docusate (SENOKOT-S;PERICOLACE) 8.6-50 MG per tablet Take 1-2 tablets by mouth 2 times daily as needed for constipation 60 tablet 0     SUMAtriptan (IMITREX) 50 MG tablet Take 1 tablet (50 mg) by mouth as needed 30 tablet 3      tamoxifen (NOLVADEX) 20 MG tablet Take 1 tablet (20 mg) by mouth daily 90 tablet 3     triamcinolone (KENALOG) 0.025 % ointment Apply topically as needed  3     Triamcinolone Acetonide (AZMACORT IN) Inhale 2 puffs into the lungs as needed       UNABLE TO FIND Take 2 capsules by mouth 3 times daily Muscle Mag. 2 caps contain B1 20mg, B2 20mg, B6 10mg, magesium 20mg, manganese 2mg.       UNABLE TO FIND 3 tablets 3 times daily MEDICATION NAME: calcium D-Glucarate   3 caps contain 180mg of elemental calcium.       UNABLE TO FIND 2 tablets 3 times daily MEDICATION NAME: Digestzymes        UNABLE TO FIND 1 tablet daily MEDICATION NAME: Pure Encapsulations       VENTOLIN  (90 BASE) MCG/ACT Inhaler INHALE 2 PUFFS INTO THE LUNGS EVERY 4 HOURS AS NEEDED FOR SHORTNESS OF BREATH OR DIFFICULT BREATHING OR WHEEZING 18 g 3         Allergies   Allergen Reactions     Baclofen Other (See Comments) and Unknown     Other reaction(s): Edema, chest pain, seizures.      Duloxetine Anaphylaxis and Other (See Comments)     Flushing, tremor/muscle twitching and edema  Serotonin syndrome     No Clinical Screening - See Comments Shortness Of Breath, Palpitations, Anaphylaxis, Itching, Swelling, Difficulty breathing and Rash     Sukhjinder wipes- oral allergy -  July 2015: throat tightness from a Chinese herbal medicine Wilmer Tran     Serotonin Anxiety, Other (See Comments) and Swelling     Seizures      Tree Nuts [Nuts] Shortness Of Breath     Amitriptyline Hcl Swelling     Birch Trees      Potatoes, carrots, cherries, celery, apple, pears, plums, peaches, parsnip, kiwi, hazelnuts, and apricots,      Blue Dyes Itching     Headaches       Cymbalta Other (See Comments)     Flushing, tremor/muscle twitching and edema     Gabapentin Other (See Comments)     edema  Systemic edema, weaned off from Feb to March per Dr. Dowd.    edema     Grass      Mugwort [Artemisia Vulgaris]      Various spices     Ragweeds      Melons, bananas,  cucumbers, zucchini.     Topamax      Nortriptyline Itching, Visual Disturbance, Swelling, GI Disturbance, Anxiety, Other (See Comments) and Nausea     Other reaction(s): Swelling           REVIEW OF SYSTEMS:    General: No acute withdrawal symptoms.  No recent infections or fever  Eyes: Negative for vision changes or eye problems  ENT: No problems with ears, nose or throat.  No difficulty swallowing.  Resp: No coughing, wheezing or shortness of breath  CV: No chest pains or palpitations other than chest wall pain  GI: No nausea, vomiting, abdominal pain, diarrhea, constipation  : No urinary frequency or dysuria.    Musculoskeletal:  See above  Neurologic: No numbness, tingling, weakness, problems with balance or coordination  Psychiatric: see above  Skin: No rashes        OBJECTIVE:                                                      EXAM    Blood pressure 131/86, pulse 78, temperature 98.6  F (37  C), SpO2 98 %, not currently breastfeeding.    GENERAL APPEARANCE: tearful  EYES: Eyes grossly normal to inspection  HENT: TM's normal, mouth without ulcers or lesions, nose no rhinorrhea  NECK: no adenopathy, thyromegaly or masses  RESP: lungs clear to auscultation - no rales, rhonchi or wheezes and no resp distress  CV: regular rates and rhythm, normal S1 S2,no murmur   ABDOMEN: soft, nontender, without hepatosplenomegaly or masses  MS: extremities normal- no gross deformities noted, gait normal, peripheral pulses normal and no edema  SKIN: no rashes, no jaundice, no obvious masses.   NEURO: Normal strength and tone, sensory exam grossly normal, no tremor  MENTAL STATUS EXAM:  Appearance/Behavior:emotionally labile  Speech: Normal  Mood/Affect: depressed affect  Insight: Poor        Results for orders placed or performed in visit on 11/30/18   Calcium timed urine with Creat Ratio   Result Value Ref Range    Calcium Urine mg/dL 10.0 mg/dL    Calcium Urine g/24 h 0.23 0.10 - 0.30 g/24 h    Calcium Urine g/g Cr 0.13  g/g Cr   Creatinine timed urine   Result Value Ref Range    Creatinine Urine 76 mg/dL    Creatinine Urine Timed 1.75 0.80 - 1.80    Volume in mL 2,300 mL    Duration in hours Unknown h     *Note: Due to a large number of results and/or encounters for the requested time period, some results have not been displayed. A complete set of results can be found in Results Review.                Patient would not agree to Utox prior to visit.          ASSESSMENT/PLAN:    1. Uncomplicated opioid dependence (H)    2. Chest wall pain    3. High grade B-cell lymphoma (H)    4. Personal history of malignant neoplasm of breast    5. Status post bariatric surgery      Reviewed continuum of dependence and addiction.   Long term risk of opioid use, opioid induced hyperanalgesia and reasoning for taper recommendations discussed at length.    Recommended consideration of transition to Buprenorphine as a safer alternative that would diminish opioid induced hyperanalgesia.  Need for medical detoxification due to high current dose and need for medical support during time after last dose of opioid until Buprenorphine could be started.   Process to enter detox discussed.  Patient is not willing to consider at current time.  Reviewed would not be able to take over current opioid prescribing.  Patient is interested in any other interventional options.  Discussed with Dr. Ma who will see patient 1/17/18 but likely will be recommending transition to Suboxone as above.    Risk of overdose reviewed.  Patient has naloxone rx.   enouraged follow up call prn.      Total time spent was  >60  minutes, and more than 50% of face to face time was spent in counseling and/or coordination of care regarding principles of multidisciplinary care, medication management, and treatment options as discussed above.      Lexus Shell MD  [facility]  [phone]

## 2019-01-23 ENCOUNTER — TELEPHONE (OUTPATIENT)
Dept: PALLIATIVE MEDICINE | Facility: CLINIC | Age: 59
End: 2019-01-23

## 2019-01-23 NOTE — TELEPHONE ENCOUNTER
"I called and talked with Dr. Benitez at length about our visit.  I believe she qualifies for a diagnosis of OUD based on her admitted overuse.  If you look at her [place]  she has picked up her prescription between 2-7 days early every month for the last year resulting in over 30 extra days of medication. She has also had 2 other prescriptions from other providers in the last 3 months without notifying Dr. Benitez. She is no longer functional and has been off work for a few months on FMLA.  She has been unable to taper down on her opioids.  I also believe she is suffering from opioid induced hyperalgesia.  Because of this I recommended detox and induction on Suboxone which can be maintained after for pain control.  Suboxone has a better side effect profile, does not cause OIH and does not cause tolerance over time so it is a much better choice for treating chronic pain.   She wanted to think about this over the weekend.      I personally called the patient to discuss our visit last week and whether or not she would like to go to detox and transition to suboxone.  She does not want to do this and is refusing at this time.  She also does not want to continue with Dr. Benitez as she does not want to be tapered down/off.  She was frustrated on the phone and does not understand why she cannot find a provider to prescribe high dose opioids and does not feel like she is getting a \"fair shake\" in the [facility] system.  She has gone to another internal medicine physician outside the system who has given her a referral to another MD whom she hopes will prescribe for her.  She also thinks there is a \"block\" that can be done that will give her long term pain relief.  I explained that I was not aware of any regional procedure that would provide long term relief.  She is free to explore these options.      I let her know I would mail her another AVS as she states she did not get one at her visit.  She is welcome to call our " office and come back for follow up at any time.      Helga Ma MD

## 2019-01-28 DIAGNOSIS — C83.30 DLBCL (DIFFUSE LARGE B CELL LYMPHOMA) (H): Primary | ICD-10-CM

## 2019-01-29 ENCOUNTER — APPOINTMENT (OUTPATIENT)
Dept: LAB | Facility: CLINIC | Age: 59
End: 2019-01-29
Payer: COMMERCIAL

## 2019-01-29 ENCOUNTER — OFFICE VISIT (OUTPATIENT)
Dept: TRANSPLANT | Facility: CLINIC | Age: 59
End: 2019-01-29
Payer: COMMERCIAL

## 2019-01-29 ENCOUNTER — ANCILLARY PROCEDURE (OUTPATIENT)
Dept: CT IMAGING | Facility: CLINIC | Age: 59
End: 2019-01-29

## 2019-01-29 VITALS
HEART RATE: 85 BPM | TEMPERATURE: 98.9 F | OXYGEN SATURATION: 95 % | BODY MASS INDEX: 30.04 KG/M2 | RESPIRATION RATE: 16 BRPM | WEIGHT: 180.5 LBS | SYSTOLIC BLOOD PRESSURE: 129 MMHG | DIASTOLIC BLOOD PRESSURE: 88 MMHG

## 2019-01-29 DIAGNOSIS — C83.38 DIFFUSE LARGE B-CELL LYMPHOMA OF LYMPH NODES OF MULTIPLE REGIONS (H): Primary | ICD-10-CM

## 2019-01-29 DIAGNOSIS — C73 PAPILLARY CARCINOMA, FOLLICULAR VARIANT (H): ICD-10-CM

## 2019-01-29 DIAGNOSIS — E83.51 HYPOCALCEMIA: ICD-10-CM

## 2019-01-29 DIAGNOSIS — C83.38 DIFFUSE LARGE B-CELL LYMPHOMA OF LYMPH NODES OF MULTIPLE REGIONS (H): ICD-10-CM

## 2019-01-29 DIAGNOSIS — E89.2 POSTSURGICAL HYPOPARATHYROIDISM (H): ICD-10-CM

## 2019-01-29 DIAGNOSIS — E89.0 POSTSURGICAL HYPOTHYROIDISM: ICD-10-CM

## 2019-01-29 LAB
ALBUMIN SERPL-MCNC: 3.4 G/DL (ref 3.4–5)
ALP SERPL-CCNC: 79 U/L (ref 40–150)
ALT SERPL W P-5'-P-CCNC: 34 U/L (ref 0–50)
ANION GAP SERPL CALCULATED.3IONS-SCNC: 5 MMOL/L (ref 3–14)
AST SERPL W P-5'-P-CCNC: 25 U/L (ref 0–45)
BASOPHILS # BLD AUTO: 0 10E9/L (ref 0–0.2)
BASOPHILS NFR BLD AUTO: 0.7 %
BILIRUB SERPL-MCNC: 0.3 MG/DL (ref 0.2–1.3)
BUN SERPL-MCNC: 15 MG/DL (ref 7–30)
CA-I SERPL ISE-MCNC: 4.3 MG/DL (ref 4.4–5.2)
CALCIUM SERPL-MCNC: 7.5 MG/DL (ref 8.5–10.1)
CALCIUM SERPL-MCNC: 7.6 MG/DL (ref 8.5–10.1)
CHLORIDE SERPL-SCNC: 109 MMOL/L (ref 94–109)
CO2 SERPL-SCNC: 28 MMOL/L (ref 20–32)
CREAT SERPL-MCNC: 0.81 MG/DL (ref 0.52–1.04)
CREAT SERPL-MCNC: 0.82 MG/DL (ref 0.52–1.04)
DIFFERENTIAL METHOD BLD: ABNORMAL
EOSINOPHIL # BLD AUTO: 0 10E9/L (ref 0–0.7)
EOSINOPHIL NFR BLD AUTO: 1 %
ERYTHROCYTE [DISTWIDTH] IN BLOOD BY AUTOMATED COUNT: 11.2 % (ref 10–15)
GFR SERPL CREATININE-BSD FRML MDRD: 79 ML/MIN/{1.73_M2}
GFR SERPL CREATININE-BSD FRML MDRD: 80 ML/MIN/{1.73_M2}
GLUCOSE SERPL-MCNC: 111 MG/DL (ref 70–99)
HCT VFR BLD AUTO: 38.3 % (ref 35–47)
HGB BLD-MCNC: 12.7 G/DL (ref 11.7–15.7)
IMM GRANULOCYTES # BLD: 0 10E9/L (ref 0–0.4)
IMM GRANULOCYTES NFR BLD: 0 %
LDH SERPL L TO P-CCNC: 158 U/L (ref 81–234)
LYMPHOCYTES # BLD AUTO: 0.9 10E9/L (ref 0.8–5.3)
LYMPHOCYTES NFR BLD AUTO: 30.1 %
MCH RBC QN AUTO: 33.4 PG (ref 26.5–33)
MCHC RBC AUTO-ENTMCNC: 33.2 G/DL (ref 31.5–36.5)
MCV RBC AUTO: 101 FL (ref 78–100)
MONOCYTES # BLD AUTO: 0.3 10E9/L (ref 0–1.3)
MONOCYTES NFR BLD AUTO: 10.8 %
NEUTROPHILS # BLD AUTO: 1.6 10E9/L (ref 1.6–8.3)
NEUTROPHILS NFR BLD AUTO: 57.4 %
NRBC # BLD AUTO: 0 10*3/UL
NRBC BLD AUTO-RTO: 0 /100
PHOSPHATE SERPL-MCNC: 3.4 MG/DL (ref 2.5–4.5)
PLATELET # BLD AUTO: 148 10E9/L (ref 150–450)
POTASSIUM SERPL-SCNC: 3.8 MMOL/L (ref 3.4–5.3)
PROT SERPL-MCNC: 6.6 G/DL (ref 6.8–8.8)
RBC # BLD AUTO: 3.8 10E12/L (ref 3.8–5.2)
SODIUM SERPL-SCNC: 142 MMOL/L (ref 133–144)
T4 FREE SERPL-MCNC: 1.19 NG/DL (ref 0.76–1.46)
TSH SERPL DL<=0.005 MIU/L-ACNC: 0.01 MU/L (ref 0.4–4)
WBC # BLD AUTO: 2.9 10E9/L (ref 4–11)

## 2019-01-29 PROCEDURE — 80053 COMPREHEN METABOLIC PANEL: CPT

## 2019-01-29 PROCEDURE — G0463 HOSPITAL OUTPT CLINIC VISIT: HCPCS

## 2019-01-29 PROCEDURE — 82310 ASSAY OF CALCIUM: CPT

## 2019-01-29 PROCEDURE — 85025 COMPLETE CBC W/AUTO DIFF WBC: CPT

## 2019-01-29 PROCEDURE — 86800 THYROGLOBULIN ANTIBODY: CPT

## 2019-01-29 PROCEDURE — 90750 HZV VACC RECOMBINANT IM: CPT | Mod: ZF

## 2019-01-29 PROCEDURE — 82565 ASSAY OF CREATININE: CPT

## 2019-01-29 PROCEDURE — 00000344 ZZHCL STATISTIC REMEASURE THYROGLOBULIN

## 2019-01-29 PROCEDURE — 84443 ASSAY THYROID STIM HORMONE: CPT

## 2019-01-29 PROCEDURE — 82330 ASSAY OF CALCIUM: CPT

## 2019-01-29 PROCEDURE — 84100 ASSAY OF PHOSPHORUS: CPT

## 2019-01-29 PROCEDURE — 83615 LACTATE (LD) (LDH) ENZYME: CPT

## 2019-01-29 PROCEDURE — 84432 ASSAY OF THYROGLOBULIN: CPT

## 2019-01-29 PROCEDURE — 25000581 ZZH RX MED A9270 GY (STAT IND- M) 250: Mod: ZF

## 2019-01-29 PROCEDURE — 25000128 H RX IP 250 OP 636: Mod: ZF

## 2019-01-29 PROCEDURE — 84439 ASSAY OF FREE THYROXINE: CPT

## 2019-01-29 PROCEDURE — 90471 IMMUNIZATION ADMIN: CPT

## 2019-01-29 RX ORDER — HEPARIN SODIUM (PORCINE) LOCK FLUSH IV SOLN 100 UNIT/ML 100 UNIT/ML
5 SOLUTION INTRAVENOUS ONCE
Status: COMPLETED | OUTPATIENT
Start: 2019-01-29 | End: 2019-01-29

## 2019-01-29 RX ORDER — IOPAMIDOL 755 MG/ML
107 INJECTION, SOLUTION INTRAVASCULAR ONCE
Status: COMPLETED | OUTPATIENT
Start: 2019-01-29 | End: 2019-01-29

## 2019-01-29 RX ADMIN — IOPAMIDOL 107 ML: 755 INJECTION, SOLUTION INTRAVASCULAR at 14:56

## 2019-01-29 RX ADMIN — ZOSTER VACCINE RECOMBINANT, ADJUVANTED 0.5 ML: KIT at 17:26

## 2019-01-29 RX ADMIN — HEPARIN SODIUM (PORCINE) LOCK FLUSH IV SOLN 100 UNIT/ML 5 ML: 100 SOLUTION at 15:38

## 2019-01-29 NOTE — DISCHARGE INSTRUCTIONS

## 2019-01-29 NOTE — PROGRESS NOTES
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 She is currently undergoing treatment with DA-R-EPOCH. Her past medical history is significant for breast cancer in 2011 s/p bilateral mastectomies and BENJIE/BSO up until recently on Tamoxifen, papillary thyroid cancer s/p total thyroidectomy, chronic chest wall pain, and asthma. Briefly, her oncologic history is as follows:    She presented in 8/2017 with dramatically worse chest and back pain. CT 9/1/17 showed lytic lesion right 10th rib extending to right T9-10 neural foramen with vertebral body invasion. There was associated conglomerate of lymphadenopathy around the mid T-spine. She had a CT guided biopsy showing B-cell high grade lymphoma. Pathology showed \"The morphology shows a population of large cells, diffuse lymphoid infiltrate. The cells are atypical with abundant mitotic and apoptotic activity and single cell necrosis. Tumor is CD45 and CD79a positive and focally weakly CD30 positive. The other stains revealed positivity for CD20, BCL6, BCL2 and MUM1, and CD10 and CD21 negative. The CD5 and CD3 highlighted background T-cells.  MYC expression was equivocal with approximately 40% nuclei staining.  Ki-67 proliferative index is greater than 90-95%. The immunohistochemistry is suggestive of non-GCB immunophenotype of diffuse large B-cell lymphoma. The cytogenetics did not show rearrangement of the BCL2 or c-MYC. There is the presence of BCL6 rearrangement in 78% of cells and gains of MYC and BCL2, but no amplifications.\"     Staging LP and BMBx were negative for lymphoma.   She started DA-REPOCH 10/6/17. She did well with chemo other than rigors during CIVI.  Post cycle 1 complicated by mucositis and worsening of chronic LE peripheral edema.   Cycle 2 on 10/27/17. Due to a rise in her LD and uric acid " levels on that admission day, she underwent a CT scan which showed response to therapy with improvement in her mediastinal lymphadenopathy and right chest wall mass.  Cycle 3 on 11/16/2017      Cycle 4 12/7/2017  C5,6 end of January/2018    Interval History:  She reports that she is well with no major changes in her health. Is working on opiod dependency with her pain management team.     Also worse neuropathy.   No lumps or abdominal symptoms.     Current Outpatient Medications   Medication Sig Dispense Refill     ACAI RAMIREZ PO Take 50 mg by mouth       acetaminophen (TYLENOL) 325 MG tablet Take 2 tablets (650 mg) by mouth every 4 hours as needed for mild pain or fever 100 tablet      acyclovir (ZOVIRAX) 400 MG tablet Take 1 tablet (400 mg) by mouth 2 times daily (Patient not taking: Reported on 1/15/2019) 30 tablet 0     aluminum chloride (DRYSOL) 20 % external solution Apply topically At Bedtime 60 mL 3     azithromycin (ZITHROMAX) 250 MG tablet Two tablets first day, then one tablet daily for four days. (Patient not taking: Reported on 1/15/2019) 6 tablet 0     bisacodyl (DULCOLAX) 5 MG EC tablet Take 3 tablets (15 mg) by mouth daily as needed for constipation (Patient not taking: Reported on 1/15/2019) 30 tablet 0     calcitRIOL (ROCALTROL) 0.25 MCG capsule TAKE 2 CAPSULES BY MOUTH EVERY MORNING AND TAKE 1 CAPSULE BY MOUTH EVERY NIGHT AT BEDTIME 90 capsule 11     calcium carbonate antacid (TUMS ULTRA 1000) 1000 MG CHEW Take 1 tablet (1,000 mg) by mouth 4 times daily as needed (severe muscle cramps)       calcium citrate-vitamin D (CALCIUM CITRATE + D3) 315-250 MG-UNIT TABS per tablet Take 2 tablets by mouth 2 times daily       celecoxib (CELEBREX) 200 MG capsule Take 1 capsule (200 mg) by mouth 2 times daily 60 capsule 2     cetirizine (ZYRTEC ALLERGY) 10 MG tablet Take 1 tablet (10 mg) by mouth 3 times daily On hold for lab test. 90 tablet 0     Cholecalciferol (VITAMIN D3) 2000 units CAPS Take 5,000 Units by  mouth daily  0     colchicine (COLCYRS) 0.6 MG tablet Take 1 tablet (0.6 mg) by mouth 3 times daily 270 tablet 3     COMPOUND CONTAINING CONTROLLED SUBSTANCE (CMPD RX) - PHARMACY TO MIX COMPOUNDED MEDICATION Ketamine 6%, Lidocaine 10% in PLO    Apply small amount to area BID  g 0     cromolyn (OPTICROM) 4 % ophthalmic solution Place 1 drop into both eyes 4 times daily 10 mL 0     cromolyn sodium (NASALCROM) 5.2 MG/ACT AERS Inhaler SPRAY ONE SPRAY( 1 ML) IN NOSTRIL DAILY (Patient not taking: Reported on 1/15/2019) 1 Bottle 6     [employer] FIBER GUMMY BEARS CHILDREN CHEW Take 5 g by mouth daily (2 gummy= 5 g =1 serving) 120 tablet 3     cyclobenzaprine (FLEXERIL) 10 MG tablet Take 1 tablet (10 mg) by mouth 3 times daily as needed (Patient taking differently: Take 10 mg by mouth 3 times daily ) 30 tablet 0     cyclobenzaprine (FLEXERIL) 5 MG tablet Take 1-2 tablets (5-10 mg) by mouth 3 times daily as needed for muscle spasms 90 tablet 0     [START ON 1/30/2019] diazepam (VALIUM) 5 MG tablet TAKE ONE TABLET ( 5 MG) BY MOUTH THREE TIMES DAILY AS NEEDED FOR MUSCLE SPASMS. FILL 10/08/2018 90 tablet 1     diclofenac (VOLTAREN) 1 % GEL topical gel Apply affected area two times daily PRN using enclosed dosing card. 100 g 3     EPINEPHrine (EPIPEN 2-CARIDAD) 0.3 MG/0.3ML injection 2-pack Inject 0.3 mLs (0.3 mg) into the muscle once as needed for anaphylaxis (Patient not taking: Reported on 1/15/2019) 0.6 mL 3     fluticasone (FLONASE) 50 MCG/ACT spray Spray 1-2 sprays into both nostrils daily (Patient not taking: Reported on 1/15/2019) 1 Bottle 11     furosemide (LASIX) 20 MG tablet Take 1 tablet (20 mg) by mouth 2 times daily 90 tablet 3     levofloxacin (LEVAQUIN) 750 MG tablet Take 1 tablet (750 mg) by mouth daily (Patient not taking: Reported on 1/15/2019) 10 tablet 0     levothyroxine (SYNTHROID/LEVOTHROID) 112 MCG tablet ON MONDAY THRU SATURDAY TAKE TWO TABLETS DAILY AND ON SUNDAY TAKE 3 TABLETS DAILY. 189 tablet 3      lidocaine (XYLOCAINE) 5 % ointment Apply quarter size amount to chest and back up to 3 times daily as needed for pain. 350 g 1     lidocaine-prilocaine (EMLA) cream Apply topically as needed for moderate pain 60 g 1     lidocaine-prilocaine (EMLA) cream Apply topically as needed (port access pain.) 30 g 1     magic mouthwash (FIRST-MOUTHWASH BLM) suspension Swish and spit 5-10 mLs in mouth every 6 hours as needed for mouth sores 237 mL 1     magic mouthwash suspension (diphenhydrAMINE, lidocaine, aluminum-magnesium & simethicone) Swish and spit 10 mLs in mouth every 6 hours as needed for mouth sores 360 mL 1     magnesium 200 MG TABS Take 200 mg by mouth 3 times daily       montelukast (SINGULAIR) 10 MG tablet Take 2 tablets (20 mg) by mouth 2 times daily 120 tablet 11     morphine (MS CONTIN) 30 MG CR tablet Take 1 tablet (30 mg) by mouth every 8 hours . Take an addition pill at night such that your evening dose is 60mg 120 tablet 0     naloxone (NARCAN) nasal spray Spray 1 spray (4 mg) into one nostril alternating nostrils as needed for opioid reversal every 2-3 minutes until assistance arrives (Patient not taking: Reported on 1/15/2019) 0.2 mL 0     olopatadine (PATANOL) 0.1 % ophthalmic solution Apply 1 drop to eye       ondansetron (ZOFRAN-ODT) 8 MG ODT tab Take 1 tablet (8 mg) by mouth every 8 hours as needed 30 tablet 1     ondansetron (ZOFRAN-ODT) 8 MG ODT tab DISSOLVE ONE TABLET IN MOUTH EVERY EIGHT HOURS AS NEEDED  30 tablet 3     order for DME Compression socks - knee high 20-30 mmHg zippered if possible 1 Units 11     order for DME Equipment being ordered: compression stockings. 20-30 mm or 30 - 40 mm as patient can tolerate. Physical therapy to determine if they should be above or below the knee. 2 Units 4     order for DME Compression stockings, medium grade, please measure and fit. Please dispense a pair. (Patient not taking: Reported on 1/15/2019) 1 Units 0     oxyCODONE IR (ROXICODONE) 30 MG tablet  Take 1 tablet (30 mg) by mouth every 4 hours as needed for moderate to severe pain . This is a 30day supply 108 tablet 0     polyethylene glycol (MIRALAX) powder Take 17 g (1 capful) by mouth daily (Patient not taking: Reported on 1/15/2019) 510 g 0     potassium chloride SA (KLOR-CON) 20 MEQ CR tablet Take 1 tablet (20 mEq) by mouth 2 times daily 60 tablet 3     Probiotic Product (PROBIOTIC DAILY PO) Take 1 capsule by mouth daily Lacto acid bifidobacterium       prochlorperazine (COMPAZINE) 10 MG tablet Take 10 mg by mouth as needed  3     ranitidine (ZANTAC) 75 MG tablet Take 1 tablet (75 mg) by mouth 3 times daily 90 tablet 0     senna-docusate (SENOKOT-S;PERICOLACE) 8.6-50 MG per tablet Take 1-2 tablets by mouth 2 times daily as needed for constipation (Patient not taking: Reported on 1/15/2019) 60 tablet 0     SUMAtriptan (IMITREX) 50 MG tablet Take 1 tablet (50 mg) by mouth as needed 30 tablet 3     tamoxifen (NOLVADEX) 20 MG tablet Take 1 tablet (20 mg) by mouth daily 90 tablet 3     triamcinolone (KENALOG) 0.025 % ointment Apply topically as needed  3     Triamcinolone Acetonide (AZMACORT IN) Inhale 2 puffs into the lungs as needed       UNABLE TO FIND Take 2 capsules by mouth 3 times daily Muscle Mag. 2 caps contain B1 20mg, B2 20mg, B6 10mg, magesium 20mg, manganese 2mg.       UNABLE TO FIND 3 tablets 3 times daily MEDICATION NAME: calcium D-Glucarate   3 caps contain 180mg of elemental calcium.       UNABLE TO FIND 2 tablets 3 times daily MEDICATION NAME: Digestzymes        UNABLE TO FIND 1 tablet daily MEDICATION NAME: Pure Encapsulations       VENTOLIN  (90 BASE) MCG/ACT Inhaler INHALE 2 PUFFS INTO THE LUNGS EVERY 4 HOURS AS NEEDED FOR SHORTNESS OF BREATH OR DIFFICULT BREATHING OR WHEEZING 18 g 3     Physical Examination:  /88 (BP Location: Left arm, Patient Position: Sitting, Cuff Size: Adult Regular)   Pulse 85   Temp 98.9  F (37.2  C) (Oral)   Resp 16   Wt 81.9 kg (180 lb 8 oz)    SpO2 95%   BMI 30.04 kg/m    Wt Readings from Last 10 Encounters:   01/29/19 81.9 kg (180 lb 8 oz)   01/17/19 79.4 kg (175 lb)   01/07/19 82.6 kg (182 lb)   12/17/18 83 kg (183 lb)   11/28/18 80.3 kg (177 lb)   11/26/18 80.3 kg (177 lb 0.5 oz)   11/26/18 80.3 kg (177 lb 1.6 oz)   09/04/18 85.3 kg (188 lb)   08/29/18 85.6 kg (188 lb 11.2 oz)   06/08/18 82.3 kg (181 lb 7 oz)   Constitutional: NAD  Eyes:  PERRL. No scleral icterus.  ENT: Oral mucosa is moist without lesions or thrush.   Cardiovascular: Regular rate and rhythm. Port accessed without surrounding erythema or drainage  Respiratory: Clear to auscultation bilaterally. No wheezes or crackles.  Gastrointestinal: Bowel sounds +. Soft, nontender  Neurologic: normal speech, up to exam table unaided  Skin:No rashes noted.   Extremities: trace lower extremity edema bilaterally, compression stockings in place.    Laboratory Data:  Lab Results   Component Value Date    WBC 2.9 (L) 01/29/2019    ANEU 1.6 01/29/2019    HGB 12.7 01/29/2019    HCT 38.3 01/29/2019     (L) 01/29/2019     01/29/2019    POTASSIUM 3.8 01/29/2019    CHLORIDE 109 01/29/2019    CO2 28 01/29/2019     (H) 01/29/2019    BUN 15 01/29/2019    CR 0.81 01/29/2019    CR 0.82 01/29/2019    MAG 2.0 08/31/2018    INR 0.99 12/28/2017    AST 25 01/29/2019    ALT 34 01/29/2019         CT 5/21/2018  IMPRESSION: In this patient with a history of multiple malignancies  including diffuse large B-cell lymphoma, breast cancer, and papillary  thyroid cancer:  1. Treatment related changes in the posterior right 10th rib with  slightly increased sclerosis and minimal residual soft tissue  thickening. No evidence of disease progression or new metastatic  disease in the chest, abdomen or pelvis.  2. Slightly increased intra and extra hepatic biliary ductal  dilatation, may be reservoir effect from prior cholecystectomy,  correlate with liver function tests.  3. Bilateral nonobstructing renal  "calculi, largest measuring 3 mm in  the inferior pole right kidney.     1/29/2019  In this patient with history of diffuse large B-cell lymphoma,  thyroid, and breast cancer, status post thyroidectomy, bilateral  mastectomy:  1.  No evidence active lymphoma in the chest, abdomen and pelvis.  2.  Small nonobstructive stone in the lower pole of the right kidney.  Small previously seen left renal stone is no longer present.  3.  Persistent biliary ductal dilatation, unchanged since 2014 and  likely secondary to postcholecystectomy state         Assessment and Plan:    DLBCL, \"double-hit\"-like, with c-myc overexpression but not re-arrangement. s/p   6 cycles of DA-R-EPOCH now in remission for 9 months.      Complication include mucositis,  infusion reaction (rigors), nausea and fatigue.     PET scan with CR with no PET avidity on 12/21 and EOT PET on 5/27/2018 confirmed CR.    Elvin had a post-treatment/end of treatment CT/PET in May 2018 which showed a complete response with resolution of all sites of disease.  The bony area along the tenth rib is stable but has very low level FDG with an SUV of 4.4.  There are no new suspicious osseous lesions.  I am pleased to share with her that she is in CR.    May 2018 - ongoing CR.    Now with worse chest pain symptoms - we will restage and CT scan CAP. Will order today.     ID.  No active infections.  S/p Shignrix vaccine and off acyclovir.     Follow up with Dr. Castro for hypothyroidism.   F/u with  who started Tamoxifen at this time.     History of Rachael en Y gastric bypass  Continue supplements (calcium citrate-vitamin D, vitamin D3, magnesium citrate). Also had iron deficiency anemia likely from poor absorption. S/o iron supplementation. Will monitor Hgb.    Chronic chest wall pain s/p mastectomies  Follows with Dr. Benitez. Palliative care to continue to wean off pain medications.        Doing great - RTC in 6 months.   Plan:  Shingrix vaccine today  CT in July " 2019 - the last scan    Garima Sauceda MD  Associate Professor of Medicine  [phone]

## 2019-01-29 NOTE — NURSING NOTE
Chief Complaint   Patient presents with     Port Draw     Labs drawn via port by RN in lab. VS taken. Pt checked in for next star     Labs collected via port accessed by CT. Line flushed with saline and heparin.  Vitals taken. Pt checked in for appointment(s).    Sara CARRENO RN PHN BSN  BMT/Oncology Lab

## 2019-01-29 NOTE — NURSING NOTE
"Oncology Rooming Note    January 29, 2019 4:58 PM   Tisha Arias is a 58 year old female who presents for:    Chief Complaint   Patient presents with     Port Draw     Labs drawn via port by RN in lab. VS taken. Pt checked in for next star     Initial Vitals: /88 (BP Location: Left arm, Patient Position: Sitting, Cuff Size: Adult Regular)   Pulse 85   Temp 98.9  F (37.2  C) (Oral)   Resp 16   Wt 81.9 kg (180 lb 8 oz)   SpO2 95%   BMI 30.04 kg/m   Estimated body mass index is 30.04 kg/m  as calculated from the following:    Height as of 11/26/18: 1.651 m (5' 5\").    Weight as of this encounter: 81.9 kg (180 lb 8 oz). Body surface area is 1.94 meters squared.  Data Unavailable Comment: Data Unavailable   No LMP recorded. Patient has had a hysterectomy.  Allergies reviewed: Yes  Medications reviewed: Yes    Medications: MEDICATION REFILLS NEEDED TODAY. Provider was notified.  Pt states needs refill of lasix  Pharmacy name entered into [employer]: [id number] - [place], [place] - [address] [place]    Clinical concerns: no concerns today     5 minutes for nursing intake (face to face time)     Aurora Bragg RN              "

## 2019-01-29 NOTE — NURSING NOTE
Shingrix vaccine administered to pt into the left deltoid. VIS given to pt. Pt tolerated well. See EROS Jimenez MA

## 2019-02-08 LAB — LAB SCANNED RESULT: NORMAL

## 2019-02-18 ENCOUNTER — MYC MEDICAL ADVICE (OUTPATIENT)
Dept: FAMILY MEDICINE | Facility: CLINIC | Age: 59
End: 2019-02-18

## 2019-02-18 ENCOUNTER — MYC REFILL (OUTPATIENT)
Dept: FAMILY MEDICINE | Facility: CLINIC | Age: 59
End: 2019-02-18

## 2019-02-18 DIAGNOSIS — M54.50 CHRONIC LOW BACK PAIN WITHOUT SCIATICA, UNSPECIFIED BACK PAIN LATERALITY: ICD-10-CM

## 2019-02-18 DIAGNOSIS — G89.29 CHRONIC LOW BACK PAIN WITHOUT SCIATICA, UNSPECIFIED BACK PAIN LATERALITY: ICD-10-CM

## 2019-02-18 RX ORDER — CYCLOBENZAPRINE HCL 5 MG
5-10 TABLET ORAL 3 TIMES DAILY PRN
Start: 2019-02-18

## 2019-02-18 NOTE — TELEPHONE ENCOUNTER
Refused Prescriptions:                       Disp   Refills    cyclobenzaprine (FLEXERIL) 5 MG tablet                     Sig: Take 1-2 tablets (5-10 mg) by mouth 3 times daily as           needed for muscle spasms  Refused By: ELA PARTIDA  Reason for Refusal: Appt required, please call patient

## 2019-02-18 NOTE — TELEPHONE ENCOUNTER
Routing refill request to provider for review/approval because:  Drug not on the [facility] refill protocol       Requested Prescriptions   Pending Prescriptions Disp Refills     cyclobenzaprine (FLEXERIL) 5 MG tablet  Last Written Prescription Date:  1/17/19  Last Fill Quantity: 90,  # refills: 0   Last office visit: 1/7/2019 with prescribing provider:     Future Office Visit:     90 tablet 0     Sig: Take 1-2 tablets (5-10 mg) by mouth 3 times daily as needed for muscle spasms    There is no refill protocol information for this order        Mary Wilkerson, CASSIDY - BC

## 2019-02-20 DIAGNOSIS — Z79.891 ENCOUNTER FOR LONG-TERM OPIATE ANALGESIC USE: ICD-10-CM

## 2019-02-20 DIAGNOSIS — G89.3 NEOPLASM RELATED PAIN: ICD-10-CM

## 2019-02-20 DIAGNOSIS — G89.28 OTHER CHRONIC POSTPROCEDURAL PAIN: ICD-10-CM

## 2019-02-20 RX ORDER — OXYCODONE HYDROCHLORIDE 30 MG/1
30 TABLET ORAL EVERY 4 HOURS PRN
Qty: 97 TABLET | Refills: 0 | Status: SHIPPED | OUTPATIENT
Start: 2019-02-22 | End: 2019-03-20

## 2019-02-20 RX ORDER — MORPHINE SULFATE 30 MG/1
30 TABLET, FILM COATED, EXTENDED RELEASE ORAL EVERY 8 HOURS
Qty: 120 TABLET | Refills: 0 | Status: SHIPPED | OUTPATIENT
Start: 2019-02-22 | End: 2019-03-20

## 2019-02-20 NOTE — TELEPHONE ENCOUNTER
"Received VM from patient requesting refills of her pain meds. She is very tearful on the VM. She asks several times for the oxycodone dose to remain the same, \"I can't do this.\" She asks if morphine could be changed instead and for more oxycodone. She states tearfully that she has already lost her job due to this.    Last refills: 1/23/2019  Last office visit: 11/28/2018  No follow up scheduled at this time     Will route request to MD for review.     Reviewed [place]  Report.    "

## 2019-02-20 NOTE — TELEPHONE ENCOUNTER
"I called her today and spoke with her twice. Notably once there was a loud electric hum quite audible on the phone like her phone was next to an electric or electronic device.    Similar to what she told Dr. Ma she reports that the taper is going terribly and she is in very severe pain.  She reports feeling miserable and very worried about further tapering and does not want to do it.  She is very upset with me and repeatedly says  something like I told her I would never do what I am doing.  I reminded her that very explicitly when she was diagnosed with lymphoma and it was painful and we talked about increasing her pain medication in that context that we are very explicitly establish care and expectations that we would reduce her pain medicine back down after her lymphoma treatment.  I delayed that reduction for many months at her request but never told her that I would not do it.  She continues to suggest that I do not believe she is in pain and I continue to try to frame for her that I absolutely believe she is in terrible pain and also absolutely believe that the pain is being maintained and worsened by her high-dose opiate therapy as well as the fact that she has an opioid use disorder, which Dr. Ma was helpful in clarifying to me that she meets the diagnostic criteria of.    I had a long talk with her about Dr. Ma's recommendation that she do inpatient detoxification and transition to Suboxone.  The patient expresses an understanding that that would mean being \"taken off all my meds with not being given anything back\".  I discussed with her  how buprenorphine works including that it would treat her withdrawal symptoms, cravings, and provide some pain relief.  It does not cause hyperalgesia like the other meds.  I discussed with her that she has an opioid use disorder by Dr. Ma's assessment and is really appropriate that we provide her effective treatment for that which is what Dr. Ma has " proposed to her.  She tells me she is seen an internist who told her that getting off the meds was a terrible idea although it is my impression that she had told the internist that the plan was to stop all of her meds entirely without any thing else such as buprenorphine.  I pointed out to her that that internist is probably not an addiction medicine specialist nor did he seem to have full information about what was being proposed.      I tried to give her encouragement-I believe she can get better and she can get through this if she allows us to get her the proper therapy for this.  I am not sure she believes that.  She expresses great displeasure towards me and expressed hope that I myself would one day go through what she is going through.    She asked for her medical records & her chart notes and I asked her to call medical records about this.  She reports that she filled out 2 pieces of paper recently and never got any of her rmedical records.  I do not know what happened with that but reminded her that I cannot request her medical records for her, she needs to request them, and I offered to give her the medical records phone number, she declined that.  It  is notable that she actually has not called medical records as far as I can tell despite being very angry about not receiving her medical records.    Been in communication with Dr. Ma and asked her and her or her clinic to reach out to Ms Archer to try to clarify her misconceptions about what the plan of care could be.  I zm  grateful for their help.    I am not sure what she is going to decide to do.  In the meantime I am going to continue tapering her medications as per my prior plan.  It is been nearly 3 months since she has seen me and I have requested that she schedule a follow-up appointment with me soon.

## 2019-02-21 ENCOUNTER — TELEPHONE (OUTPATIENT)
Dept: PALLIATIVE MEDICINE | Facility: CLINIC | Age: 59
End: 2019-02-21

## 2019-02-21 NOTE — TELEPHONE ENCOUNTER
Her palliative care physician has asked me to reach out to Elvin again to further discuss detox.  Please call her and have her schedule a follow up with me to discuss this.  My recommendations have not changed since her initial consult on 1/17/2019. (see below)  She has turned down a detox bed twice.  If she goes to detox she will need to tolerate being off all opioid type medications for 12 hours.  She will be given comfort medication while there to help with her withdrawal symptoms.        To check the status of detox bed availability at [facility] call:  [phone] (call at 0800 prior to going to the ER)    1. Medication Management:   1. The majority of the visit was spent discussing her opioid pain medications. We discussed the use of chronic opioids for her chronic pain and why I think they are no longer indicated.  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  I believe her options at this point include continuation of her slow taper - either completely off or down to more reasonable doses (less than 90 ME) vs detox and transition to Suboxone MAT.  I am recommending detox secondary to the fact that she is not tolerating the taper and is miserable currently with the decreases.  She is not a candidate for a home induction of Suboxone secondary to the large dose she is on.   I called the detox and put a bed on hold for her for today or tomorrow and she opted to go home over the weekend and discuss this with her family before she makes any decision.   I will call her on Monday to discuss whether or not she would like to proceed with detox.  If she is induced on suboxone at detox I would be happy to continue to provide suboxone MAT on an outpatient basis after.  I strongly believe she would do much better over time on this vs the high dose traditional opioids she is currently on.  However, she  will need to tolerate an increase in her pain for a short time while her body readjusts from her current tolerance to opioids.  She would need to be able to be off all opioids for 12-24 hours before starting this and her pain would be worse during this time.

## 2019-02-21 NOTE — TELEPHONE ENCOUNTER
Called patient, LM to call back to discuss    Evie Cabrera  BSN, RN Care Coordinator  [facility]

## 2019-02-22 NOTE — TELEPHONE ENCOUNTER
Called patient. LM to call back to discuss or make f/u with provider.     Evie PERKINSN, RN Care Coordinator  [facility]

## 2019-02-25 ENCOUNTER — OFFICE VISIT (OUTPATIENT)
Dept: FAMILY MEDICINE | Facility: CLINIC | Age: 59
End: 2019-02-25
Payer: COMMERCIAL

## 2019-02-25 VITALS
TEMPERATURE: 98.3 F | SYSTOLIC BLOOD PRESSURE: 154 MMHG | HEART RATE: 100 BPM | WEIGHT: 178.2 LBS | BODY MASS INDEX: 29.65 KG/M2 | OXYGEN SATURATION: 96 % | RESPIRATION RATE: 16 BRPM | DIASTOLIC BLOOD PRESSURE: 84 MMHG

## 2019-02-25 DIAGNOSIS — M62.838 MUSCLE SPASM: ICD-10-CM

## 2019-02-25 DIAGNOSIS — R07.89 CHEST WALL PAIN: Primary | ICD-10-CM

## 2019-02-25 PROCEDURE — 99214 OFFICE O/P EST MOD 30 MIN: CPT | Performed by: FAMILY MEDICINE

## 2019-02-25 RX ORDER — CYCLOBENZAPRINE HCL 5 MG
5-10 TABLET ORAL 2 TIMES DAILY PRN
Qty: 90 TABLET | Refills: 0 | Status: SHIPPED | OUTPATIENT
Start: 2019-02-25 | End: 2019-06-10

## 2019-02-25 NOTE — TELEPHONE ENCOUNTER
Patient came in for an appointment and provider filled out papers I faxed to [facility] to [phone].  Dionne Rich,

## 2019-02-25 NOTE — TELEPHONE ENCOUNTER
FINAL PRIOR AUTHORIZATION STATUS:    1.  Name of Medication & Dose: Oxycodone     2. Prior Auth Status: Approved through 02/21/2020     3. Action Taken: Pharmacy Notified: yes Patient Notified: yes   Patient notified of Provider's message as written.  Patient verbalized understanding.    Lucia Stein RN

## 2019-02-25 NOTE — PROGRESS NOTES
SUBJECTIVE:   Tisha Arias is a 58 year old female who presents to clinic today for the following health issues:    Chief Complaint   Patient presents with     Forms     Update disability forms     Health Maintenance     GAD7, ACT, PHQ9       Patient presents for disability paperwork.  She is currently weaning off opioids and will need to stay out of work until she is completely off.  She has seen a pain specialist who recommended she undergo detox in a controlled environment with suboxone, which she is afraid to do.    Problem list and histories reviewed & adjusted, as indicated.  Additional history: as documented    BP Readings from Last 3 Encounters:   02/25/19 154/84   01/29/19 129/88   01/17/19 117/75    Wt Readings from Last 3 Encounters:   02/25/19 80.8 kg (178 lb 3.2 oz)   01/29/19 81.9 kg (180 lb 8 oz)   01/17/19 79.4 kg (175 lb)        Reviewed and updated as needed this visit by clinical staff  Tobacco  Allergies  Meds  Problems  Med Hx  Surg Hx  Fam Hx       Reviewed and updated as needed this visit by Provider  Tobacco  Allergies  Meds  Problems  Med Hx  Surg Hx  Fam Hx         ROS:  Constitutional, HEENT, cardiovascular, pulmonary, gi and gu systems are negative, except as otherwise noted.    OBJECTIVE:     /84   Pulse 100   Temp 98.3  F (36.8  C) (Oral)   Resp 16   Wt 80.8 kg (178 lb 3.2 oz)   SpO2 96%   BMI 29.65 kg/m    Body mass index is 29.65 kg/m .  GENERAL: healthy, alert and no distress  EYES: Eyes grossly normal to inspection, PERRL and conjunctivae and sclerae normal  NECK: no adenopathy, no asymmetry, masses, or scars and thyroid normal to palpation  MS: no gross musculoskeletal defects noted, no edema  SKIN: no suspicious lesions or rashes  NEURO: Generalized hyperesthesia/tenderness, Normal strength and tone, mentation intact and speech normal  PSYCH: mentation appears normal, affect normal/bright    ASSESSMENT/PLAN:         ICD-10-CM    1. Chest wall  pain R07.89    2. Muscle spasm M62.838 cyclobenzaprine (FLEXERIL) 5 MG tablet       Patient Instructions     Elvin,    It was a pleasure seeing you in clinic today.  Here is an outline of the plan:    1. Chronic pain - I have sent in a prescription for flexeril.  I agree with Dr. Ma in that the best course of treatment would be detox with suboxone in a controlled environment.  Please follow-up with Dr. Ma to discuss further.    Lexus Shell MD  [facility]  [phone]    Helga Ma MD  [facility] [address], [place], [place], [address] [phone]      Pablo Zarco MD     A total of 25 minutes spent face-to-face with greater than 50% of the time spent in counseling and coordinating cares of the issues above   Pablo Zarco MD  [facility]

## 2019-02-25 NOTE — PATIENT INSTRUCTIONS
Elvin,    It was a pleasure seeing you in clinic today.  Here is an outline of the plan:    1. Chronic pain - I have sent in a prescription for flexeril.  I agree with Dr. Ma in that the best course of treatment would be detox with suboxone in a controlled environment.  Please follow-up with Dr. Ma to discuss further.    Lexus Shell MD  [facility]  [phone]    Helga Ma MD  [address], [place], [place], [address] [phone]      Pablo Zarco MD

## 2019-02-26 NOTE — TELEPHONE ENCOUNTER
Called patient and LM (no pt identifiers) advised to make follow up appt to discuss plan of care    Evie GARCIA, RN Care Coordinator  [facility]

## 2019-03-04 ENCOUNTER — TELEPHONE (OUTPATIENT)
Dept: PALLIATIVE CARE | Facility: CLINIC | Age: 59
End: 2019-03-04

## 2019-03-04 NOTE — TELEPHONE ENCOUNTER
I called her just now too on her mobile and home phone numbers - no answer.    If anyone connects with her I'd 100% recommend/beg her to present to the [facility] [place] behavioral ED for (hopefully) opioid detoxification treatment.

## 2019-03-04 NOTE — TELEPHONE ENCOUNTER
"Received VM from patient stating she has been off of morphine for 24 hours and \"cannot do this anymore.\" She then is crying for the remainder of the VM.     Attempted to call patient - unable to reach her at cell phone. Left VM asking for call back and advising that she should report to ED based on the information she told me in her VM.    Confirmed with pharmacist at [facility] that patient did  most recent scripts for oxycodone and morphine on 2/22/2019 (these scripts are not showing in [place]  Report).    Attempted to reach patient at home # as well, was also unable to reach her. VM had no identifiers, left general VM asking for patient to call me back.     Will update MD.   "

## 2019-03-11 ENCOUNTER — OFFICE VISIT (OUTPATIENT)
Dept: FAMILY MEDICINE | Facility: CLINIC | Age: 59
End: 2019-03-11
Payer: COMMERCIAL

## 2019-03-11 VITALS
DIASTOLIC BLOOD PRESSURE: 81 MMHG | SYSTOLIC BLOOD PRESSURE: 183 MMHG | BODY MASS INDEX: 29.52 KG/M2 | WEIGHT: 177.2 LBS | HEIGHT: 65 IN | HEART RATE: 87 BPM | RESPIRATION RATE: 12 BRPM | OXYGEN SATURATION: 95 % | TEMPERATURE: 98.6 F

## 2019-03-11 DIAGNOSIS — R10.13 EPIGASTRIC PAIN: Primary | ICD-10-CM

## 2019-03-11 PROCEDURE — 99214 OFFICE O/P EST MOD 30 MIN: CPT | Performed by: PHYSICIAN ASSISTANT

## 2019-03-11 ASSESSMENT — MIFFLIN-ST. JEOR: SCORE: 1384.65

## 2019-03-11 ASSESSMENT — PAIN SCALES - GENERAL: PAINLEVEL: EXTREME PAIN (8)

## 2019-03-11 NOTE — PROGRESS NOTES
"  SUBJECTIVE:   Tisha Arias is a 58 year old female who presents to clinic today for the following health issues:    Headaches    Duration: Thursday evening     Description  Location: bilateral in the frontal area   Character: throbbing pain  Frequency:  Constant   Duration:  24 hour off and on     Intensity:  moderate    Accompanying signs and symptoms:    Precipitating or Alleviating factors:  Nausea/vomiting: sometimes  Dizziness: usually  Weakness or numbness: usually  Visual changes: none  Fever: no   Sinus or URI symptoms no     History  Head trauma: no   Family history of migraines: YES   Previous tests for headaches: YES  Neurologist evaluations: no   Able to do daily activities when headache present: no   Wake with headaches: YES  Daily pain medication use: YES- tylenol   Any changes in: none     Precipitating or Alleviating factors (light/sound/sleep/caffeine): none     Therapies tried and outcome: tyenol     Outcome - not effective  Frequent/daily pain medication use: no    Problem list and histories reviewed & adjusted, as indicated.  Additional history: Patient notes the abdominal pain is excruciating.  She says her upper stomach hurts with every breath and she felt a tearing sensation a few hours earlier.       ROS:  Constitutional, HEENT, cardiovascular, pulmonary, gi and gu systems are negative, except as otherwise noted.    OBJECTIVE:     /81   Pulse 87   Temp 98.6  F (37  C) (Oral)   Resp 12   Ht 1.651 m (5' 5\")   Wt 80.4 kg (177 lb 3.2 oz)   SpO2 95%   BMI 29.49 kg/m    Body mass index is 29.49 kg/m .  GENERAL: healthy, alert and no distress  HENT: ear canals and TM's normal, nose and mouth without ulcers or lesions  NECK: no adenopathy, no asymmetry, masses, or scars and thyroid normal to palpation  RESP: lungs clear to auscultation - no rales, rhonchi or wheezes  CV: regular rate and rhythm, normal S1 S2, no S3 or S4, no murmur, click or rub, no peripheral edema and " peripheral pulses strong  ABDOMEN: tenderness epigastric with marked tenderness to light palpation, deep and rebound, bowel sounds normal and auscultated in lower chest as well as abdomen.   MS: no gross musculoskeletal defects noted, no edema  SKIN: no suspicious lesions or rashes    Diagnostic Test Results:  none     ASSESSMENT/PLAN:   1. Epigastric pain      Proceed to ER for evaluation.  Patient amenable to this follow up plan.     Katt Caro PA-C  [facility]

## 2019-03-15 ENCOUNTER — RECORDS - HEALTHEAST (OUTPATIENT)
Dept: ADMINISTRATIVE | Facility: OTHER | Age: 59
End: 2019-03-15

## 2019-03-15 ENCOUNTER — ONCOLOGY VISIT (OUTPATIENT)
Dept: ONCOLOGY | Facility: CLINIC | Age: 59
End: 2019-03-15
Attending: INTERNAL MEDICINE
Payer: COMMERCIAL

## 2019-03-15 ENCOUNTER — TELEPHONE (OUTPATIENT)
Dept: ONCOLOGY | Facility: CLINIC | Age: 59
End: 2019-03-15

## 2019-03-15 ENCOUNTER — ANCILLARY PROCEDURE (OUTPATIENT)
Dept: CT IMAGING | Facility: CLINIC | Age: 59
End: 2019-03-15
Attending: PHYSICIAN ASSISTANT

## 2019-03-15 VITALS
DIASTOLIC BLOOD PRESSURE: 98 MMHG | BODY MASS INDEX: 28.82 KG/M2 | OXYGEN SATURATION: 99 % | HEART RATE: 89 BPM | HEIGHT: 65 IN | WEIGHT: 173 LBS | SYSTOLIC BLOOD PRESSURE: 151 MMHG | RESPIRATION RATE: 16 BRPM | TEMPERATURE: 99 F

## 2019-03-15 DIAGNOSIS — C83.30 DIFFUSE LARGE B-CELL LYMPHOMA, UNSPECIFIED BODY REGION (H): ICD-10-CM

## 2019-03-15 DIAGNOSIS — R22.1 NECK SWELLING: ICD-10-CM

## 2019-03-15 DIAGNOSIS — R07.9 CHEST PAIN, UNSPECIFIED TYPE: ICD-10-CM

## 2019-03-15 DIAGNOSIS — R06.00 DYSPNEA, UNSPECIFIED TYPE: Primary | ICD-10-CM

## 2019-03-15 LAB
ALBUMIN SERPL-MCNC: 4 G/DL (ref 3.4–5)
ALP SERPL-CCNC: 74 U/L (ref 40–150)
ALT SERPL W P-5'-P-CCNC: 22 U/L (ref 0–50)
ANION GAP SERPL CALCULATED.3IONS-SCNC: 8 MMOL/L (ref 3–14)
AST SERPL W P-5'-P-CCNC: 15 U/L (ref 0–45)
BASOPHILS # BLD AUTO: 0 10E9/L (ref 0–0.2)
BASOPHILS NFR BLD AUTO: 0.4 %
BILIRUB SERPL-MCNC: 0.5 MG/DL (ref 0.2–1.3)
BUN SERPL-MCNC: 16 MG/DL (ref 7–30)
CALCIUM SERPL-MCNC: 8 MG/DL (ref 8.5–10.1)
CHLORIDE SERPL-SCNC: 106 MMOL/L (ref 94–109)
CO2 SERPL-SCNC: 26 MMOL/L (ref 20–32)
CREAT BLD-MCNC: 0.9 MG/DL (ref 0.52–1.04)
CREAT SERPL-MCNC: 0.79 MG/DL (ref 0.52–1.04)
DIFFERENTIAL METHOD BLD: ABNORMAL
EOSINOPHIL # BLD AUTO: 0 10E9/L (ref 0–0.7)
EOSINOPHIL NFR BLD AUTO: 0.6 %
ERYTHROCYTE [DISTWIDTH] IN BLOOD BY AUTOMATED COUNT: 10.8 % (ref 10–15)
GFR SERPL CREATININE-BSD FRML MDRD: 64 ML/MIN/{1.73_M2}
GFR SERPL CREATININE-BSD FRML MDRD: 83 ML/MIN/{1.73_M2}
GLUCOSE SERPL-MCNC: 101 MG/DL (ref 70–99)
HCT VFR BLD AUTO: 44.9 % (ref 35–47)
HGB BLD-MCNC: 14.5 G/DL (ref 11.7–15.7)
IMM GRANULOCYTES # BLD: 0 10E9/L (ref 0–0.4)
IMM GRANULOCYTES NFR BLD: 0.2 %
LDH SERPL L TO P-CCNC: 184 U/L (ref 81–234)
LYMPHOCYTES # BLD AUTO: 1.2 10E9/L (ref 0.8–5.3)
LYMPHOCYTES NFR BLD AUTO: 22.9 %
MCH RBC QN AUTO: 32 PG (ref 26.5–33)
MCHC RBC AUTO-ENTMCNC: 32.3 G/DL (ref 31.5–36.5)
MCV RBC AUTO: 99 FL (ref 78–100)
MONOCYTES # BLD AUTO: 0.4 10E9/L (ref 0–1.3)
MONOCYTES NFR BLD AUTO: 7.6 %
NEUTROPHILS # BLD AUTO: 3.6 10E9/L (ref 1.6–8.3)
NEUTROPHILS NFR BLD AUTO: 68.3 %
NRBC # BLD AUTO: 0 10*3/UL
NRBC BLD AUTO-RTO: 0 /100
PLATELET # BLD AUTO: 141 10E9/L (ref 150–450)
POTASSIUM SERPL-SCNC: 3.6 MMOL/L (ref 3.4–5.3)
PROT SERPL-MCNC: 7.1 G/DL (ref 6.8–8.8)
RBC # BLD AUTO: 4.53 10E12/L (ref 3.8–5.2)
SODIUM SERPL-SCNC: 140 MMOL/L (ref 133–144)
URATE SERPL-MCNC: 5.2 MG/DL (ref 2.6–6)
WBC # BLD AUTO: 5.3 10E9/L (ref 4–11)

## 2019-03-15 PROCEDURE — 84550 ASSAY OF BLOOD/URIC ACID: CPT

## 2019-03-15 PROCEDURE — G0463 HOSPITAL OUTPT CLINIC VISIT: HCPCS | Mod: ZF

## 2019-03-15 PROCEDURE — 80053 COMPREHEN METABOLIC PANEL: CPT

## 2019-03-15 PROCEDURE — 85025 COMPLETE CBC W/AUTO DIFF WBC: CPT

## 2019-03-15 PROCEDURE — 83615 LACTATE (LD) (LDH) ENZYME: CPT

## 2019-03-15 PROCEDURE — 99214 OFFICE O/P EST MOD 30 MIN: CPT | Mod: ZP | Performed by: PHYSICIAN ASSISTANT

## 2019-03-15 RX ORDER — IOPAMIDOL 755 MG/ML
100 INJECTION, SOLUTION INTRAVASCULAR ONCE
Status: COMPLETED | OUTPATIENT
Start: 2019-03-15 | End: 2019-03-15

## 2019-03-15 RX ADMIN — IOPAMIDOL 100 ML: 755 INJECTION, SOLUTION INTRAVASCULAR at 18:22

## 2019-03-15 ASSESSMENT — PAIN SCALES - GENERAL: PAINLEVEL: EXTREME PAIN (8)

## 2019-03-15 ASSESSMENT — MIFFLIN-ST. JEOR: SCORE: 1365.6

## 2019-03-15 NOTE — LETTER
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 Her past medical history is significant for breast cancer in 2011 s/p bilateral mastectomies and BENJIE/BSO up until recently on Tamoxifen, papillary thyroid cancer s/p total thyroidectomy, chronic chest wall pain, and asthma. Briefly, her oncologic history is as follows:     She presented in 8/2017 with dramatically worse chest and back pain. CT 9/1/17 showed lytic lesion right 10th rib extending to right T9-10 neural foramen with vertebral body invasion. There was associated conglomerate of lymphadenopathy around the mid T-spine. She had a CT guided biopsy showing B-cell high grade lymphoma. Pathology showed \"The morphology shows a population of large cells, diffuse lymphoid infiltrate. The cells are atypical with abundant mitotic and apoptotic activity and single cell necrosis. Tumor is CD45 and CD79a positive and focally weakly CD30 positive. The other stains revealed positivity for CD20, BCL6, BCL2 and MUM1, and CD10 and CD21 negative. The CD5 and CD3 highlighted background T-cells.  MYC expression was equivocal with approximately 40% nuclei staining.  Ki-67 proliferative index is greater than 90-95%. The immunohistochemistry is suggestive of non-GCB immunophenotype of diffuse large B-cell lymphoma. The cytogenetics did not show rearrangement of the BCL2 or c-MYC. There is the presence of BCL6 rearrangement in 78% of cells and gains of MYC and BCL2, but no amplifications.\"     Staging LP and BMBx were negative for lymphoma.   She started DA-REPOCH 10/6/17. She did well with chemo other than rigors during CIVI.  Post cycle 1 complicated by mucositis and worsening of chronic LE peripheral edema.   Cycle " "2 on 10/27/17. Due to a rise in her LD and uric acid levels on that admission day, she underwent a CT scan which showed response to therapy with improvement in her mediastinal lymphadenopathy and right chest wall mass.  Cycle 3 on 11/16/2017      Cycle 4 12/7/2017  C5,6 end of January/2018    Last CT CAP on 1/29/19 with no evidence of active lymphoma in chest, abdomen, pelvis    Interval History:  Elvin is here as add on visit for multiple concerns. She has had chronic chest pain after bilateral mastectomy, but in past 2 weeks she feels this is different in characterization. She feels pressure and pulsation in her substernal chest that radiates laterally, as well as to back and down to abdomen. She did not feel this pulsation previously. She feels that her chest is full of fluid which is also new. With inspiration, she feels resistance. She denies any dyspnea, only that she cannot take a deep breath. Denies any palpitations. She feels pain radiating into left shoulder and down left arm. She fills fluid is backing up into her neck and that her neck and face is swollen. She describes feeling \"cricoid pressure\". She denies any palpable lymph nodes. She feels the fluid is backing up and causing occipital and bilateral temporal headaches. She is taking tylenol/excedrin. She has history of migraines with photosensitivity but these headaches feel a little different. She has abdominal spasms and occasional nausea. Abdominal pain radiates into inguinal area. She denies diarrhea but has some constipation. Has ongoing lower extremity edema and has been taking lasix up until February when she took her last dose. She is also having hot flashes. Her weight is down about 10 lbs since January per chart. Denies dysuria.    She was seen at [facility] ED on 3/11 for similar symptoms. A CT A/P was done that did not suggest residual or recurrent lymphoma in the abdomen or pelvis. She did have a dilated bile duct up to 15mm with hx " of prior cholecystectomy. LFTs were normal. EKG was normal and negative for ischemia or ST elevation. CBC and CMP were unremarkable, including a troponin.     Current Outpatient Medications   Medication Sig Dispense Refill     ACAI RAMIREZ PO Take 50 mg by mouth       acetaminophen (TYLENOL) 325 MG tablet Take 2 tablets (650 mg) by mouth every 4 hours as needed for mild pain or fever 100 tablet      aluminum chloride (DRYSOL) 20 % external solution Apply topically At Bedtime 60 mL 3     bisacodyl (DULCOLAX) 5 MG EC tablet Take 3 tablets (15 mg) by mouth daily as needed for constipation 30 tablet 0     calcitRIOL (ROCALTROL) 0.25 MCG capsule TAKE 2 CAPSULES BY MOUTH EVERY MORNING AND TAKE 1 CAPSULE BY MOUTH EVERY NIGHT AT BEDTIME 90 capsule 11     calcium carbonate antacid (TUMS ULTRA 1000) 1000 MG CHEW Take 1 tablet (1,000 mg) by mouth 4 times daily as needed (severe muscle cramps)       calcium citrate-vitamin D (CALCIUM CITRATE + D3) 315-250 MG-UNIT TABS per tablet Take 2 tablets by mouth 2 times daily       celecoxib (CELEBREX) 200 MG capsule Take 1 capsule (200 mg) by mouth 2 times daily 60 capsule 2     cetirizine (ZYRTEC ALLERGY) 10 MG tablet Take 1 tablet (10 mg) by mouth 3 times daily On hold for lab test. 90 tablet 0     Cholecalciferol (VITAMIN D3) 2000 units CAPS Take 5,000 Units by mouth daily  0     colchicine (COLCYRS) 0.6 MG tablet Take 1 tablet (0.6 mg) by mouth 3 times daily 270 tablet 3     COMPOUND CONTAINING CONTROLLED SUBSTANCE (CMPD RX) - PHARMACY TO MIX COMPOUNDED MEDICATION Ketamine 6%, Lidocaine 10% in PLO    Apply small amount to area BID  g 0     cromolyn (OPTICROM) 4 % ophthalmic solution Place 1 drop into both eyes 4 times daily 10 mL 0     cromolyn sodium (NASALCROM) 5.2 MG/ACT AERS Inhaler SPRAY ONE SPRAY( 1 ML) IN NOSTRIL DAILY 1 Bottle 6     [employer] FIBER GUMMY BEARS CHILDREN CHEW Take 5 g by mouth daily (2 gummy= 5 g =1 serving) 120 tablet 3     cyclobenzaprine (FLEXERIL) 10 MG  tablet Take 1 tablet (10 mg) by mouth 3 times daily as needed 30 tablet 0     diazepam (VALIUM) 5 MG tablet TAKE ONE TABLET ( 5 MG) BY MOUTH THREE TIMES DAILY AS NEEDED FOR MUSCLE SPASMS. FILL 10/08/2018 90 tablet 1     diclofenac (VOLTAREN) 1 % GEL topical gel Apply affected area two times daily PRN using enclosed dosing card. 100 g 3     EPINEPHrine (EPIPEN 2-CARIDAD) 0.3 MG/0.3ML injection 2-pack Inject 0.3 mLs (0.3 mg) into the muscle once as needed for anaphylaxis 0.6 mL 3     fluticasone (FLONASE) 50 MCG/ACT spray Spray 1-2 sprays into both nostrils daily 1 Bottle 11     furosemide (LASIX) 20 MG tablet Take 1 tablet (20 mg) by mouth 2 times daily 90 tablet 3     levothyroxine (SYNTHROID/LEVOTHROID) 112 MCG tablet ON MONDAY THRU SATURDAY TAKE TWO TABLETS DAILY AND ON SUNDAY TAKE 3 TABLETS DAILY. 189 tablet 3     lidocaine (XYLOCAINE) 5 % ointment Apply quarter size amount to chest and back up to 3 times daily as needed for pain. 350 g 1     lidocaine-prilocaine (EMLA) cream Apply topically as needed for moderate pain 60 g 1     magic mouthwash (FIRST-MOUTHWASH BLM) suspension Swish and spit 5-10 mLs in mouth every 6 hours as needed for mouth sores 237 mL 1     magnesium 200 MG TABS Take 200 mg by mouth 3 times daily       montelukast (SINGULAIR) 10 MG tablet Take 2 tablets (20 mg) by mouth 2 times daily 120 tablet 11     morphine (MS CONTIN) 30 MG CR tablet Take 1 tablet (30 mg) by mouth every 8 hours . Take an addition pill at night such that your evening dose is 60mg 120 tablet 0     naloxone (NARCAN) nasal spray Spray 1 spray (4 mg) into one nostril alternating nostrils as needed for opioid reversal every 2-3 minutes until assistance arrives 0.2 mL 0     olopatadine (PATANOL) 0.1 % ophthalmic solution Apply 1 drop to eye       ondansetron (ZOFRAN-ODT) 8 MG ODT tab Take 1 tablet (8 mg) by mouth every 8 hours as needed 30 tablet 1     order for DME Compression socks - knee high 20-30 mmHg zippered if possible 1  Units 11     oxyCODONE IR (ROXICODONE) 30 MG tablet Take 1 tablet (30 mg) by mouth every 4 hours as needed for moderate to severe pain . This is a 30day supply 97 tablet 0     polyethylene glycol (MIRALAX) powder Take 17 g (1 capful) by mouth daily 510 g 0     potassium chloride SA (KLOR-CON) 20 MEQ CR tablet Take 1 tablet (20 mEq) by mouth 2 times daily 60 tablet 3     Probiotic Product (PROBIOTIC DAILY PO) Take 1 capsule by mouth daily Lacto acid bifidobacterium       prochlorperazine (COMPAZINE) 10 MG tablet Take 10 mg by mouth as needed  3     ranitidine (ZANTAC) 75 MG tablet Take 1 tablet (75 mg) by mouth 3 times daily 90 tablet 0     senna-docusate (SENOKOT-S;PERICOLACE) 8.6-50 MG per tablet Take 1-2 tablets by mouth 2 times daily as needed for constipation 60 tablet 0     SUMAtriptan (IMITREX) 50 MG tablet Take 1 tablet (50 mg) by mouth as needed 30 tablet 3     tamoxifen (NOLVADEX) 20 MG tablet Take 1 tablet (20 mg) by mouth daily 90 tablet 3     triamcinolone (KENALOG) 0.025 % ointment Apply topically as needed  3     Triamcinolone Acetonide (AZMACORT IN) Inhale 2 puffs into the lungs as needed       UNABLE TO FIND Take 2 capsules by mouth 3 times daily Muscle Mag. 2 caps contain B1 20mg, B2 20mg, B6 10mg, magesium 20mg, manganese 2mg.       UNABLE TO FIND 3 tablets 3 times daily MEDICATION NAME: calcium D-Glucarate   3 caps contain 180mg of elemental calcium.       UNABLE TO FIND 2 tablets 3 times daily MEDICATION NAME: Digestzymes        UNABLE TO FIND 1 tablet daily MEDICATION NAME: Pure Encapsulations       VENTOLIN  (90 BASE) MCG/ACT Inhaler INHALE 2 PUFFS INTO THE LUNGS EVERY 4 HOURS AS NEEDED FOR SHORTNESS OF BREATH OR DIFFICULT BREATHING OR WHEEZING 18 g 3       Physical Examination:  General: The patient is a pleasant female in no acute distress.  There were no vitals taken for this visit.  Wt Readings from Last 10 Encounters:   03/11/19 80.4 kg (177 lb 3.2 oz)   02/25/19 80.8 kg (178 lb 3.2  oz)   01/29/19 81.9 kg (180 lb 8 oz)   01/17/19 79.4 kg (175 lb)   01/07/19 82.6 kg (182 lb)   12/17/18 83 kg (183 lb)   11/28/18 80.3 kg (177 lb)   11/26/18 80.3 kg (177 lb 0.5 oz)   11/26/18 80.3 kg (177 lb 1.6 oz)   09/04/18 85.3 kg (188 lb)     HEENT: EOMI, PERRL. Sclerae are anicteric. Oral mucosa is pink and moist with no lesions or thrush.   Lymph: No palpable lymphadenopathy in the cervical, supraclavicular or axillary areas.   Heart: Regular rate and rhythm.   Lungs: Clear to auscultation bilaterally.   Abdomen: Bowel sounds present, soft, tender in epigastric area, otherwise nontender, nondistended  Extremities: +1 pitting lower extremity edema noted bilaterally. Calves are symmetric on measurement  Neuro: Cranial nerves II through XII are grossly intact. Normal gait. Speech clear.   Skin: No rashes, petechiae, or bruising noted on exposed skin.    Laboratory Data:  Results for STEPHANIE LOVETT (MRN [id number]) as of 3/16/2019 16:53   3/15/2019 18:50   Sodium 140   Potassium 3.6   Chloride 106   Carbon Dioxide 26   Urea Nitrogen 16   Creatinine 0.79   GFR Estimate 83   GFR Estimate If Black >90   Calcium 8.0 (L)   Anion Gap 8   Albumin 4.0   Protein Total 7.1   Bilirubin Total 0.5   Alkaline Phosphatase 74   ALT 22   AST 15   Lactate Dehydrogenase 184   Uric Acid 5.2   Glucose 101 (H)   WBC 5.3   Hemoglobin 14.5   Hematocrit 44.9   Platelet Count 141 (L)   RBC Count 4.53   MCV 99   MCH 32.0   MCHC 32.3   RDW 10.8   Diff Method Automated Method   % Neutrophils 68.3   % Lymphocytes 22.9   % Monocytes 7.6   % Eosinophils 0.6   % Basophils 0.4   % Immature Granulocytes 0.2   Nucleated RBCs 0   Absolute Neutrophil 3.6   Absolute Lymphocytes 1.2   Absolute Monocytes 0.4   Absolute Eosinophils 0.0   Absolute Basophils 0.0   Abs Immature Granulocytes 0.0   Absolute Nucleated RBC 0.0       Imaging:  Per Care Everywhere:    CT A/P 3/11/19 at [facility]  IMPRESSION:     1.  No finding suggestive of residual or  recurrent lymphoma.    2.  Diffuse intrahepatic biliary ductal dilatation with common duct dilated up to 15 mm, in the setting of prior cholecystectomy and comparison to prior cross-sectional imaging studies recommended to assess for stability, since this might be chronic. If evidence clinically or biochemically for cholestasis, MRCP could be obtained to exclude common duct stone.    3.  Non-obstructing 7 mm right renal stone, otherwise normal urinary tract.    4.  No bowel abnormality. Substantial stool seen throughout non-dilated large bowel. Normal appendix. Prior gastric surgery but no acute finding involving the stomach.    5.  Hysterectomy; no adnexal lesion.    REPORT SIGNED BY DR. Darwin Mcfarland    CT SOFT TISSUE NECK W CONTRAST 3/15/2019 6:24 PM     History:  59 yo female with hx of breast cancer, DLBCL and thyroid  cancer with complaint of neck pain/swelling; Neck swelling  ICD-10: Neck swelling      Comparison:  Thyroid ultrasound 5/2/2013.      Technique: Following intravenous administration of nonionic iodinated  contrast medium, thin section helical CT images were obtained from the  skull base down to the level of the aortic arch.  Axial, coronal and  sagittal reformations were performed with 3 mm slice thickness  reconstruction. Images were reviewed in soft tissue, lung and bone  windows.     Contrast: Isovue 370 100ml     Findings:   Evaluation of the mucosal space demonstrates no evident abnormality in  the nasopharynx, oropharynx, hypopharynx or the glottis. The tongue  base appears normal. The major salivary glands appear unremarkable.  The thyroid gland is absent.     There is no evident cervical lymphadenopathy. The fascial spaces in  the neck are intact bilaterally. Right IJ Port-A-Cath coursing into  the SVC with tip not included in the field-of-view. Mild  atherosclerotic calcification of both carotid bifurcations.     Evaluation of the osseous structures demonstrate no worrisome lytic  or  sclerotic lesion. Cervical spondylosis, most pronounced at C6-7. The  visualized paranasal sinuses are clear. The mastoid air cells are  clear.      The visualized lung apices are clear.                                                                      Impression:  No mass adenopathy within the neck.      ANTOINETTE WAY MD    EXAMINATION: CTA pulmonary angiogram, 3/15/2019 6:24 PM      COMPARISON: CT CAP 1/29/2019     HISTORY: 50 year-old female history of breast cancer, diffuse large B  cell lymphoma and thyroid cancer. Report of chest pain with  inspiration     TECHNIQUE: Volumetric helical acquisition of CT images of the chest  from the lung apices to the kidneys were acquired after the  administration of 80 mL of Isovue-370 IV contrast. Flash technique  with free breathing acquisition.  Three-dimensional (3D)  post-processed angiographic images were reconstructed, archived to  PACS and used in interpretation of this study.      FINDINGS:  Contrast bolus is: adequate.  Exam is negative for acute  pulmonary embolism. No evidence of right heart strain.     Normal caliber of the pulmonary artery and aorta. Mild coronary artery  calcifications. The heart size is normal.     No focal pulmonary consolidation, suspicious nodules, or pleural  effusion.     Upper abdomen: Limited. Postoperative changes of gastric bypass.  Cholecystectomy mild central intrahepatic biliary ductal dilatation.     Bones: Multiple hypodense lesions in thoracic spine, for example in  the T10 vertebral body, the T2 vertebral body laterally the inferior  aspect of the T6 vertebral body.  These are stable.                                                                      IMPRESSION:   No pulmonary embolism. No acute findings in the chest to explain the  patient's symptoms.        In the event of a positive result for acute pulmonary embolism  resulting in right heart strain, please activate the PERT  Multidisciplinary group for  consultation by juice [phone]  ([phone]).      PERT -- Pulmonary Embolism Response Team (Multidisciplinary team  including cardiology, interventional radiology, critical care,  hematology)     I have personally reviewed the examination and initial interpretation  and I agree with the findings.     ASHLEY HAILE MD    Assessment and Plan:  57 yo female with history of papillary thyroid carcinoma, stage I, ER/NV positive HER2 negative breast cancer s/p b/l mastectomies and BENJIE/BSO, and DLBCL in ongoing CR. She also has history of Rachael en Y gastric bypass and chronic chest wall pain s/p mastectomies. Seen today for add on visit for multiple issues as above. She was seen in [facility] ED on 3/11 with CT A/P as above and cardiac workup (EKG, trop) which was negative.    Chest pain, neck swelling: She has chronic chest wall pain s/p bilateral mastectomy that she feels has changed in past 2 weeks with sensation of pulsation and pressure/resistance to deep inhalation. She describes feeling of fluid backing up into neck causing neck and facial swelling, and headaches from increased pressure. She denies any dyspnea, but does feel pressure in throat and difficulty taking deep breath. O2 sats 99% on room air with HR 80s. She maintains sats at 97% with ambulation but HR does increase to 100 with ambulation. No palpable cervical or supraclavicular KIM. Neck appears symmetric. Possible etiologies include recurrence of lymphoma, PE.  --CT chest for PE negative for PE or signs of lymphoma, pleural effusion. Heart size is normal on this CT and post-treatment echo in Feb 2018 wit EF 60-65% (she had 2+ pitting  BLE edema at that time).    --CT neck with no signs of lymphoma.   I let patient know that there were no acute findings on CT scans to explain her symptoms. Labs also unremarkable. At this point I have no explanation of her symptoms.     Headaches: Bilateral temporal and occpital headaches. No focal neurologic symptoms  "on exam. History of migraines. Likely tension headaches.    DLBCL, \"double-hit\"-like, with c-myc overexpression but not re-arrangement. s/p   6 cycles of DA-R-EPOCH now in remission for 9 months.   --Complication include mucositis,  infusion reaction (rigors), nausea and fatigue.   --PET scan with CR with no PET avidity on 12/21 and EOT PET on 5/27/2018 confirmed CR.  Elvin had a post-treatment/end of treatment CT/PET in May 2018 which showed a complete response with resolution of all sites of disease.  The bony area along the tenth rib is stable but has very low level FDG with an SUV of 4.4. There are no new suspicious osseous lesions.    May 2018 - ongoing CR.  Last restaging CT CAP on 1/29/19 with no e/o active lymphoma  --She is concerned about hotflashes. She is on tamoxifen but states she has not had hotflashes or sweats before on tamoxifen. She does have some weight loss. CBC and CMP at [facility] were normal on 3/11.  --LDH, uric acid, CMP were WNL. CBC with mild TCP of 141k, but stable    Hx Papillary thyroid cancer: followed by Dr. Castro. She underwent etoh ablation x 3.     Stage I, ER/VT-positive, HER2-negative breast cancer: dx in 2011. Oncotype recurrence score of 11 (7% recurrence risk after 5 years of tamoxifen). S/p bilateral mastectomies and BENJIE/BSO. For endocrine therapy she was on arimidex from 5398-4621, then changed to tamoxifen b/c of arthralgias on Arimidex.  We discussed continuing her tamoxifen. Plan for 10 years     History of Rachael en Y gastric bypass  Continue supplements (calcium citrate-vitamin D, vitamin D3, magnesium citrate). Also had iron deficiency anemia likely from poor absorption. S/o iron supplementation. Hgb 13.7 on 3/11 and 14.5 today.     Chronic chest wall pain s/p mastectomies  Follows with Dr. Benitez.  Of note, after she left upon chart review I saw notes by Dr Benitez and Dr. Ma recommending hospitalization for opioid detoxification. Unfortunately I was " not able to discuss with her their recommendations as she had already left clinic. Elvin did not ask for pain medications this visit.     Follow up: Scheduled with Dr. Crawley in May and Dr. Sauceda in July    COTY Naidu

## 2019-03-15 NOTE — NURSING NOTE
"Oncology Rooming Note    March 15, 2019 4:27 PM   Tisha Arias is a 58 year old female who presents for:    Chief Complaint   Patient presents with     Oncology Clinic Visit     Breast Cancer     Initial Vitals: BP (!) 151/98   Pulse 89   Temp 99  F (37.2  C) (Oral)   Resp 16   Ht 1.651 m (5' 5\")   Wt 78.5 kg (173 lb)   SpO2 99%   BMI 28.79 kg/m   Estimated body mass index is 28.79 kg/m  as calculated from the following:    Height as of this encounter: 1.651 m (5' 5\").    Weight as of this encounter: 78.5 kg (173 lb). Body surface area is 1.9 meters squared.  Extreme Pain (8) Comment: Chest, Throat   No LMP recorded. Patient has had a hysterectomy.  Allergies reviewed: Yes  Medications reviewed: Yes    Medications: Medication refills not needed today.  Pharmacy name entered into [employer]: [id number] - [place], [place] - [address] [place]    Clinical concerns: No New Concerns    SUGAR Lira    "

## 2019-03-15 NOTE — DISCHARGE INSTRUCTIONS

## 2019-03-15 NOTE — TELEPHONE ENCOUNTER
Pt calling to report sx as noted in [employer] message from 3/14/19.   Pt states for about 1 1/2 weeks she has been having chest pressure. Feels like fluid in both lungs. Is not short of breath but feels like fluid. Feels like it goes up her neck and into her face as well. She also notes the same feeling in her left shoulder and it moves into her elbow and down to fingers.     Was seen in ED on Monday where they did an abdominal CT to r/o hernia and blood work to r/o MI but pt states they did not address the chest pain.     The pain is constant, nothing she does makes it better or worse.  Feels like she is getting headaches do to the pressure.     Denies fever, cough or cold sx. Does report hot flashes.     They noted to the pt in the ED that she has a large bruise and pt feels she has some petichiae as well.     She notes clear drainage from her eyes that persists all day long. Is crusted in the am but cant tell is crustiness  recurs in the day time since she does not look at it.     Pt states she wont go back to the ED and she wants someone to call her back with a plan. Home or cell is fine    Paged to Selina Elizondo PA-C. She will see Elvin today at 350p. No labs prior to visit. Pt notified and agrees with plan. Told pt that we may not be able to get any imaging if needed here today.

## 2019-03-15 NOTE — PROGRESS NOTES
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 Her past medical history is significant for breast cancer in 2011 s/p bilateral mastectomies and BENJIE/BSO up until recently on Tamoxifen, papillary thyroid cancer s/p total thyroidectomy, chronic chest wall pain, and asthma. Briefly, her oncologic history is as follows:     She presented in 8/2017 with dramatically worse chest and back pain. CT 9/1/17 showed lytic lesion right 10th rib extending to right T9-10 neural foramen with vertebral body invasion. There was associated conglomerate of lymphadenopathy around the mid T-spine. She had a CT guided biopsy showing B-cell high grade lymphoma. Pathology showed \"The morphology shows a population of large cells, diffuse lymphoid infiltrate. The cells are atypical with abundant mitotic and apoptotic activity and single cell necrosis. Tumor is CD45 and CD79a positive and focally weakly CD30 positive. The other stains revealed positivity for CD20, BCL6, BCL2 and MUM1, and CD10 and CD21 negative. The CD5 and CD3 highlighted background T-cells.  MYC expression was equivocal with approximately 40% nuclei staining.  Ki-67 proliferative index is greater than 90-95%. The immunohistochemistry is suggestive of non-GCB immunophenotype of diffuse large B-cell lymphoma. The cytogenetics did not show rearrangement of the BCL2 or c-MYC. There is the presence of BCL6 rearrangement in 78% of cells and gains of MYC and BCL2, but no amplifications.\"     Staging LP and BMBx were negative for lymphoma.   She started DA-REPOCH 10/6/17. She did well with chemo other than rigors during CIVI.  Post cycle 1 complicated by mucositis and worsening of chronic LE peripheral edema.   Cycle 2 on 10/27/17. Due to a rise in her LD and uric acid levels on that admission day, " "she underwent a CT scan which showed response to therapy with improvement in her mediastinal lymphadenopathy and right chest wall mass.  Cycle 3 on 11/16/2017      Cycle 4 12/7/2017  C5,6 end of January/2018    Last CT CAP on 1/29/19 with no evidence of active lymphoma in chest, abdomen, pelvis    Interval History:  Elvin is here as add on visit for multiple concerns. She has had chronic chest pain after bilateral mastectomy, but in past 2 weeks she feels this is different in characterization. She feels pressure and pulsation in her substernal chest that radiates laterally, as well as to back and down to abdomen. She did not feel this pulsation previously. She feels that her chest is full of fluid which is also new. With inspiration, she feels resistance. She denies any dyspnea, only that she cannot take a deep breath. Denies any palpitations. She feels pain radiating into left shoulder and down left arm. She fills fluid is backing up into her neck and that her neck and face is swollen. She describes feeling \"cricoid pressure\". She denies any palpable lymph nodes. She feels the fluid is backing up and causing occipital and bilateral temporal headaches. She is taking tylenol/excedrin. She has history of migraines with photosensitivity but these headaches feel a little different. She has abdominal spasms and occasional nausea. Abdominal pain radiates into inguinal area. She denies diarrhea but has some constipation. Has ongoing lower extremity edema and has been taking lasix up until February when she took her last dose. She is also having hot flashes. Her weight is down about 10 lbs since January per chart. Denies dysuria.    She was [facility] at [facility] ED on 3/11 for similar symptoms. A CT A/P was done that did not suggest residual or recurrent lymphoma in the abdomen or pelvis. She did have a dilated bile duct up to 15mm with hx of prior cholecystectomy. LFTs were normal. EKG was normal and negative for ischemia " or ST elevation. CBC and CMP were unremarkable, including a troponin.     Current Outpatient Medications   Medication Sig Dispense Refill     ACAI RAMIREZ PO Take 50 mg by mouth       acetaminophen (TYLENOL) 325 MG tablet Take 2 tablets (650 mg) by mouth every 4 hours as needed for mild pain or fever 100 tablet      aluminum chloride (DRYSOL) 20 % external solution Apply topically At Bedtime 60 mL 3     bisacodyl (DULCOLAX) 5 MG EC tablet Take 3 tablets (15 mg) by mouth daily as needed for constipation 30 tablet 0     calcitRIOL (ROCALTROL) 0.25 MCG capsule TAKE 2 CAPSULES BY MOUTH EVERY MORNING AND TAKE 1 CAPSULE BY MOUTH EVERY NIGHT AT BEDTIME 90 capsule 11     calcium carbonate antacid (TUMS ULTRA 1000) 1000 MG CHEW Take 1 tablet (1,000 mg) by mouth 4 times daily as needed (severe muscle cramps)       calcium citrate-vitamin D (CALCIUM CITRATE + D3) 315-250 MG-UNIT TABS per tablet Take 2 tablets by mouth 2 times daily       celecoxib (CELEBREX) 200 MG capsule Take 1 capsule (200 mg) by mouth 2 times daily 60 capsule 2     cetirizine (ZYRTEC ALLERGY) 10 MG tablet Take 1 tablet (10 mg) by mouth 3 times daily On hold for lab test. 90 tablet 0     Cholecalciferol (VITAMIN D3) 2000 units CAPS Take 5,000 Units by mouth daily  0     colchicine (COLCYRS) 0.6 MG tablet Take 1 tablet (0.6 mg) by mouth 3 times daily 270 tablet 3     COMPOUND CONTAINING CONTROLLED SUBSTANCE (CMPD RX) - PHARMACY TO MIX COMPOUNDED MEDICATION Ketamine 6%, Lidocaine 10% in PLO    Apply small amount to area BID  g 0     cromolyn (OPTICROM) 4 % ophthalmic solution Place 1 drop into both eyes 4 times daily 10 mL 0     cromolyn sodium (NASALCROM) 5.2 MG/ACT AERS Inhaler SPRAY ONE SPRAY( 1 ML) IN NOSTRIL DAILY 1 Bottle 6     [employer] FIBER GUMMY BEARS CHILDREN CHEW Take 5 g by mouth daily (2 gummy= 5 g =1 serving) 120 tablet 3     cyclobenzaprine (FLEXERIL) 10 MG tablet Take 1 tablet (10 mg) by mouth 3 times daily as needed 30 tablet 0     diazepam  (VALIUM) 5 MG tablet TAKE ONE TABLET ( 5 MG) BY MOUTH THREE TIMES DAILY AS NEEDED FOR MUSCLE SPASMS. FILL 10/08/2018 90 tablet 1     diclofenac (VOLTAREN) 1 % GEL topical gel Apply affected area two times daily PRN using enclosed dosing card. 100 g 3     EPINEPHrine (EPIPEN 2-CARIDAD) 0.3 MG/0.3ML injection 2-pack Inject 0.3 mLs (0.3 mg) into the muscle once as needed for anaphylaxis 0.6 mL 3     fluticasone (FLONASE) 50 MCG/ACT spray Spray 1-2 sprays into both nostrils daily 1 Bottle 11     furosemide (LASIX) 20 MG tablet Take 1 tablet (20 mg) by mouth 2 times daily 90 tablet 3     levothyroxine (SYNTHROID/LEVOTHROID) 112 MCG tablet ON MONDAY THRU SATURDAY TAKE TWO TABLETS DAILY AND ON SUNDAY TAKE 3 TABLETS DAILY. 189 tablet 3     lidocaine (XYLOCAINE) 5 % ointment Apply quarter size amount to chest and back up to 3 times daily as needed for pain. 350 g 1     lidocaine-prilocaine (EMLA) cream Apply topically as needed for moderate pain 60 g 1     magic mouthwash (FIRST-MOUTHWASH BLM) suspension Swish and spit 5-10 mLs in mouth every 6 hours as needed for mouth sores 237 mL 1     magnesium 200 MG TABS Take 200 mg by mouth 3 times daily       montelukast (SINGULAIR) 10 MG tablet Take 2 tablets (20 mg) by mouth 2 times daily 120 tablet 11     morphine (MS CONTIN) 30 MG CR tablet Take 1 tablet (30 mg) by mouth every 8 hours . Take an addition pill at night such that your evening dose is 60mg 120 tablet 0     naloxone (NARCAN) nasal spray Spray 1 spray (4 mg) into one nostril alternating nostrils as needed for opioid reversal every 2-3 minutes until assistance arrives 0.2 mL 0     olopatadine (PATANOL) 0.1 % ophthalmic solution Apply 1 drop to eye       ondansetron (ZOFRAN-ODT) 8 MG ODT tab Take 1 tablet (8 mg) by mouth every 8 hours as needed 30 tablet 1     order for DME Compression socks - knee high 20-30 mmHg zippered if possible 1 Units 11     oxyCODONE IR (ROXICODONE) 30 MG tablet Take 1 tablet (30 mg) by mouth every  4 hours as needed for moderate to severe pain . This is a 30day supply 97 tablet 0     polyethylene glycol (MIRALAX) powder Take 17 g (1 capful) by mouth daily 510 g 0     potassium chloride SA (KLOR-CON) 20 MEQ CR tablet Take 1 tablet (20 mEq) by mouth 2 times daily 60 tablet 3     Probiotic Product (PROBIOTIC DAILY PO) Take 1 capsule by mouth daily Lacto acid bifidobacterium       prochlorperazine (COMPAZINE) 10 MG tablet Take 10 mg by mouth as needed  3     ranitidine (ZANTAC) 75 MG tablet Take 1 tablet (75 mg) by mouth 3 times daily 90 tablet 0     senna-docusate (SENOKOT-S;PERICOLACE) 8.6-50 MG per tablet Take 1-2 tablets by mouth 2 times daily as needed for constipation 60 tablet 0     SUMAtriptan (IMITREX) 50 MG tablet Take 1 tablet (50 mg) by mouth as needed 30 tablet 3     tamoxifen (NOLVADEX) 20 MG tablet Take 1 tablet (20 mg) by mouth daily 90 tablet 3     triamcinolone (KENALOG) 0.025 % ointment Apply topically as needed  3     Triamcinolone Acetonide (AZMACORT IN) Inhale 2 puffs into the lungs as needed       UNABLE TO FIND Take 2 capsules by mouth 3 times daily Muscle Mag. 2 caps contain B1 20mg, B2 20mg, B6 10mg, magesium 20mg, manganese 2mg.       UNABLE TO FIND 3 tablets 3 times daily MEDICATION NAME: calcium D-Glucarate   3 caps contain 180mg of elemental calcium.       UNABLE TO FIND 2 tablets 3 times daily MEDICATION NAME: Digestzymes        UNABLE TO FIND 1 tablet daily MEDICATION NAME: Pure Encapsulations       VENTOLIN  (90 BASE) MCG/ACT Inhaler INHALE 2 PUFFS INTO THE LUNGS EVERY 4 HOURS AS NEEDED FOR SHORTNESS OF BREATH OR DIFFICULT BREATHING OR WHEEZING 18 g 3       Physical Examination:  General: The patient is a pleasant female in no acute distress.  There were no vitals taken for this visit.  Wt Readings from Last 10 Encounters:   03/11/19 80.4 kg (177 lb 3.2 oz)   02/25/19 80.8 kg (178 lb 3.2 oz)   01/29/19 81.9 kg (180 lb 8 oz)   01/17/19 79.4 kg (175 lb)   01/07/19 82.6 kg (182  lb)   12/17/18 83 kg (183 lb)   11/28/18 80.3 kg (177 lb)   11/26/18 80.3 kg (177 lb 0.5 oz)   11/26/18 80.3 kg (177 lb 1.6 oz)   09/04/18 85.3 kg (188 lb)     HEENT: EOMI, PERRL. Sclerae are anicteric. Oral mucosa is pink and moist with no lesions or thrush.   Lymph: No palpable lymphadenopathy in the cervical, supraclavicular or axillary areas.   Heart: Regular rate and rhythm.   Lungs: Clear to auscultation bilaterally.   Abdomen: Bowel sounds present, soft, tender in epigastric area, otherwise nontender, nondistended  Extremities: +1 pitting lower extremity edema noted bilaterally. Calves are symmetric on measurement  Neuro: Cranial nerves II through XII are grossly intact. Normal gait. Speech clear.   Skin: No rashes, petechiae, or bruising noted on exposed skin.    Laboratory Data:  Results for STEPHANIE LOVETT (MRN [id number]) as of 3/16/2019 16:53   3/15/2019 18:50   Sodium 140   Potassium 3.6   Chloride 106   Carbon Dioxide 26   Urea Nitrogen 16   Creatinine 0.79   GFR Estimate 83   GFR Estimate If Black >90   Calcium 8.0 (L)   Anion Gap 8   Albumin 4.0   Protein Total 7.1   Bilirubin Total 0.5   Alkaline Phosphatase 74   ALT 22   AST 15   Lactate Dehydrogenase 184   Uric Acid 5.2   Glucose 101 (H)   WBC 5.3   Hemoglobin 14.5   Hematocrit 44.9   Platelet Count 141 (L)   RBC Count 4.53   MCV 99   MCH 32.0   MCHC 32.3   RDW 10.8   Diff Method Automated Method   % Neutrophils 68.3   % Lymphocytes 22.9   % Monocytes 7.6   % Eosinophils 0.6   % Basophils 0.4   % Immature Granulocytes 0.2   Nucleated RBCs 0   Absolute Neutrophil 3.6   Absolute Lymphocytes 1.2   Absolute Monocytes 0.4   Absolute Eosinophils 0.0   Absolute Basophils 0.0   Abs Immature Granulocytes 0.0   Absolute Nucleated RBC 0.0       Imaging:  Per Care Everywhere:    CT A/P 3/11/19 at [facility]  IMPRESSION:     1.  No finding suggestive of residual or recurrent lymphoma.    2.  Diffuse intrahepatic biliary ductal dilatation with common duct  dilated up to 15 mm, in the setting of prior cholecystectomy and comparison to prior cross-sectional imaging studies recommended to assess for stability, since this might be chronic. If evidence clinically or biochemically for cholestasis, MRCP could be obtained to exclude common duct stone.    3.  Non-obstructing 7 mm right renal stone, otherwise normal urinary tract.    4.  No bowel abnormality. Substantial stool seen throughout non-dilated large bowel. Normal appendix. Prior gastric surgery but no acute finding involving the stomach.    5.  Hysterectomy; no adnexal lesion.    REPORT SIGNED BY DR. Darwin Mcfarland    CT SOFT TISSUE NECK W CONTRAST 3/15/2019 6:24 PM     History:  57 yo female with hx of breast cancer, DLBCL and thyroid  cancer with complaint of neck pain/swelling; Neck swelling  ICD-10: Neck swelling      Comparison:  Thyroid ultrasound 5/2/2013.      Technique: Following intravenous administration of nonionic iodinated  contrast medium, thin section helical CT images were obtained from the  skull base down to the level of the aortic arch.  Axial, coronal and  sagittal reformations were performed with 3 mm slice thickness  reconstruction. Images were reviewed in soft tissue, lung and bone  windows.     Contrast: Isovue 370 100ml     Findings:   Evaluation of the mucosal space demonstrates no evident abnormality in  the nasopharynx, oropharynx, hypopharynx or the glottis. The tongue  base appears normal. The major salivary glands appear unremarkable.  The thyroid gland is absent.     There is no evident cervical lymphadenopathy. The fascial spaces in  the neck are intact bilaterally. Right IJ Port-A-Cath coursing into  the SVC with tip not included in the field-of-view. Mild  atherosclerotic calcification of both carotid bifurcations.     Evaluation of the osseous structures demonstrate no worrisome lytic or  sclerotic lesion. Cervical spondylosis, most pronounced at C6-7. The  visualized paranasal  sinuses are clear. The mastoid air cells are  clear.      The visualized lung apices are clear.                                                                      Impression:  No mass adenopathy within the neck.      ANTOINETTE WAY MD    EXAMINATION: CTA pulmonary angiogram, 3/15/2019 6:24 PM      COMPARISON: CT CAP 1/29/2019     HISTORY: 50 year-old female history of breast cancer, diffuse large B  cell lymphoma and thyroid cancer. Report of chest pain with  inspiration     TECHNIQUE: Volumetric helical acquisition of CT images of the chest  from the lung apices to the kidneys were acquired after the  administration of 80 mL of Isovue-370 IV contrast. Flash technique  with free breathing acquisition.  Three-dimensional (3D)  post-processed angiographic images were reconstructed, archived to  PACS and used in interpretation of this study.      FINDINGS:  Contrast bolus is: adequate.  Exam is negative for acute  pulmonary embolism. No evidence of right heart strain.     Normal caliber of the pulmonary artery and aorta. Mild coronary artery  calcifications. The heart size is normal.     No focal pulmonary consolidation, suspicious nodules, or pleural  effusion.     Upper abdomen: Limited. Postoperative changes of gastric bypass.  Cholecystectomy mild central intrahepatic biliary ductal dilatation.     Bones: Multiple hypodense lesions in thoracic spine, for example in  the T10 vertebral body, the T2 vertebral body laterally the inferior  aspect of the T6 vertebral body.  These are stable.                                                                      IMPRESSION:   No pulmonary embolism. No acute findings in the chest to explain the  patient's symptoms.        In the event of a positive result for acute pulmonary embolism  resulting in right heart strain, please activate the PERT  Multidisciplinary group for consultation by paging [phone]  ([phone]).      PERT -- Pulmonary Embolism Response Team  "(Multidisciplinary team  including cardiology, interventional radiology, critical care,  hematology)     I have personally reviewed the examination and initial interpretation  and I agree with the findings.     ASHLEY HAILE MD    Assessment and Plan:  59 yo female with history of papillary thyroid carcinoma, stage I, ER/NH positive HER2 negative breast cancer s/p b/l mastectomies and BENJIE/BSO, and DLBCL in ongoing CR. She also has history of Rachael en Y gastric bypass and chronic chest wall pain s/p mastectomies. Seen today for add on visit for multiple issues as above. She was seen in [facility] ED on 3/11 with CT A/P as above and cardiac workup (EKG, trop) which was negative.    Chest pain, neck swelling: She has chronic chest wall pain s/p bilateral mastectomy that she feels has changed in past 2 weeks with sensation of pulsation and pressure/resistance to deep inhalation. She describes feeling of fluid backing up into neck causing neck and facial swelling, and headaches from increased pressure. She denies any dyspnea, but does feel pressure in throat and difficulty taking deep breath. O2 sats 99% on room air with HR 80s. She maintains sats at 97% with ambulation but HR does increase to 100 with ambulation. No palpable cervical or supraclavicular KIM. Neck appears symmetric. Possible etiologies include recurrence of lymphoma, PE.  --CT chest for PE negative for PE or signs of lymphoma, pleural effusion. Heart size is normal on this CT and post-treatment echo in Feb 2018 wit EF 60-65% (she had 2+ pitting  BLE edema at that time).    --CT neck with no signs of lymphoma.   I let patient know that there were no acute findings on CT scans to explain her symptoms. Labs also unremarkable. At this point I have no explanation of her symptoms.     Headaches: Bilateral temporal and occpital headaches. No focal neurologic symptoms on exam. History of migraines. Likely tension headaches.    DLBCL, \"double-hit\"-like, with " c-myc overexpression but not re-arrangement. s/p   6 cycles of DA-R-EPOCH now in remission for 9 months.   --Complication include mucositis,  infusion reaction (rigors), nausea and fatigue.   --PET scan with CR with no PET avidity on 12/21 and EOT PET on 5/27/2018 confirmed CR.  Elivn had a post-treatment/end of treatment CT/PET in May 2018 which showed a complete response with resolution of all sites of disease.  The bony area along the tenth rib is stable but has very low level FDG with an SUV of 4.4. There are no new suspicious osseous lesions.    May 2018 - ongoing CR.  Last restaging CT CAP on 1/29/19 with no e/o active lymphoma  --She is concerned about hotflashes. She is on tamoxifen but states she has not had hotflashes or sweats before on tamoxifen. She does have some weight loss. CBC and CMP at [facility] were normal on 3/11.  --LDH, uric acid, CMP were WNL. CBC with mild TCP of 141k, but stable    Hx Papillary thyroid cancer: followed by Dr. Castro. She underwent etoh ablation x 3.     Stage I, ER/PA-positive, HER2-negative breast cancer: dx in 2011. Oncotype recurrence score of 11 (7% recurrence risk after 5 years of tamoxifen). S/p bilateral mastectomies and BENJIE/BSO. For endocrine therapy she was on arimidex from 9288-1165, then changed to tamoxifen b/c of arthralgias on Arimidex.  We discussed continuing her tamoxifen. Plan for 10 years     History of Rachael en Y gastric bypass  Continue supplements (calcium citrate-vitamin D, vitamin D3, magnesium citrate). Also had iron deficiency anemia likely from poor absorption. S/o iron supplementation. Hgb 13.7 on 3/11 and 14.5 today.     Chronic chest wall pain s/p mastectomies  Follows with Dr. Benitez. Of note, after she left upon chart review I saw notes by Dr Benitez and Dr. Ma recommending hospitalization for opioid detoxification. Unfortunately I was not able to discuss with her their recommendations as she had already left clinic. Elvin did  not ask for pain medications this visit.     Follow up: Scheduled with Dr. Crawley in May and Dr. Sauceda in July    Selina Elizondo PA-C  [facility]  [address] [place] [address] [phone]

## 2019-03-20 ENCOUNTER — MYC REFILL (OUTPATIENT)
Dept: ONCOLOGY | Facility: CLINIC | Age: 59
End: 2019-03-20

## 2019-03-20 ENCOUNTER — MYC REFILL (OUTPATIENT)
Dept: FAMILY MEDICINE | Facility: CLINIC | Age: 59
End: 2019-03-20

## 2019-03-20 ENCOUNTER — MYC REFILL (OUTPATIENT)
Dept: ENDOCRINOLOGY | Facility: CLINIC | Age: 59
End: 2019-03-20

## 2019-03-20 ENCOUNTER — MYC MEDICAL ADVICE (OUTPATIENT)
Dept: ONCOLOGY | Facility: CLINIC | Age: 59
End: 2019-03-20

## 2019-03-20 ENCOUNTER — MYC REFILL (OUTPATIENT)
Dept: DERMATOLOGY | Facility: CLINIC | Age: 59
End: 2019-03-20

## 2019-03-20 ENCOUNTER — MYC REFILL (OUTPATIENT)
Dept: PALLIATIVE CARE | Facility: CLINIC | Age: 59
End: 2019-03-20

## 2019-03-20 DIAGNOSIS — E89.2 POSTSURGICAL HYPOPARATHYROIDISM (H): ICD-10-CM

## 2019-03-20 DIAGNOSIS — Z85.3 PERSONAL HISTORY OF MALIGNANT NEOPLASM OF BREAST: ICD-10-CM

## 2019-03-20 DIAGNOSIS — G89.28 OTHER CHRONIC POSTPROCEDURAL PAIN: ICD-10-CM

## 2019-03-20 DIAGNOSIS — R07.89 CHEST WALL PAIN: ICD-10-CM

## 2019-03-20 DIAGNOSIS — C73 PAPILLARY CARCINOMA, FOLLICULAR VARIANT (H): ICD-10-CM

## 2019-03-20 DIAGNOSIS — R60.0 LOCALIZED EDEMA: ICD-10-CM

## 2019-03-20 DIAGNOSIS — G43.519 INTRACTABLE PERSISTENT MIGRAINE AURA WITHOUT CEREBRAL INFARCTION AND WITHOUT STATUS MIGRAINOSUS: ICD-10-CM

## 2019-03-20 DIAGNOSIS — Z79.891 ENCOUNTER FOR LONG-TERM OPIATE ANALGESIC USE: ICD-10-CM

## 2019-03-20 DIAGNOSIS — R11.2 NAUSEA AND VOMITING, INTRACTABILITY OF VOMITING NOT SPECIFIED, UNSPECIFIED VOMITING TYPE: ICD-10-CM

## 2019-03-20 DIAGNOSIS — E87.6 HYPOKALEMIA: ICD-10-CM

## 2019-03-20 DIAGNOSIS — B96.89 BACTERIAL SINUSITIS: ICD-10-CM

## 2019-03-20 DIAGNOSIS — M79.18 MYOFASCIAL PAIN: ICD-10-CM

## 2019-03-20 DIAGNOSIS — C85.10 HIGH GRADE B-CELL LYMPHOMA (H): ICD-10-CM

## 2019-03-20 DIAGNOSIS — G89.3 NEOPLASM RELATED PAIN: ICD-10-CM

## 2019-03-20 DIAGNOSIS — J32.9 BACTERIAL SINUSITIS: ICD-10-CM

## 2019-03-20 DIAGNOSIS — E89.0 POSTSURGICAL HYPOTHYROIDISM: ICD-10-CM

## 2019-03-20 DIAGNOSIS — C77.0 METASTASIS TO CERVICAL LYMPH NODE (H): ICD-10-CM

## 2019-03-20 DIAGNOSIS — L30.4 INTERTRIGO: ICD-10-CM

## 2019-03-20 DIAGNOSIS — R21 RASH: ICD-10-CM

## 2019-03-20 DIAGNOSIS — D89.42 IDIOPATHIC MAST CELL ACTIVATION SYNDROME (H): Chronic | ICD-10-CM

## 2019-03-20 DIAGNOSIS — D05.10 DUCTAL CARCINOMA IN SITU (DCIS) OF BREAST, UNSPECIFIED LATERALITY: ICD-10-CM

## 2019-03-20 DIAGNOSIS — C73 THYROID CANCER (H): ICD-10-CM

## 2019-03-20 RX ORDER — LEVOTHYROXINE SODIUM 112 UG/1
TABLET ORAL
Qty: 189 TABLET | Refills: 3 | Status: CANCELLED | OUTPATIENT
Start: 2019-03-20

## 2019-03-20 RX ORDER — CALCITRIOL 0.25 UG/1
CAPSULE, LIQUID FILLED ORAL
Qty: 90 CAPSULE | Refills: 11 | Status: CANCELLED | OUTPATIENT
Start: 2019-03-20

## 2019-03-20 NOTE — TELEPHONE ENCOUNTER
Called patient following her request to . She is needing refills of several medications and also sent her request via [employer].     She asks for the oxycodone to remain the same, she states she needs it due to decreasing her morphine. She tells me that she is now only taking morphine 1 tab BID.     Patient tells me that she is seeking a second opinion - medication taper is putting too much stress on her body, she states she has already been treated for cancer 4 times and doesn't want to do that again.     She agrees to scheduling an apt in a few weeks with MD, advised patient has not been seen since November and she does need to come to clinic to follow up with him to continue receiving meds. Advised if she would like to cancel due to second opinion or finding another pain specialist she can call me to do this as she tells me that she is hopeful to see Dr. Benitez after she has her second opinion apt.     She notes that for continued med tapers it would be easier if she had tab strengths that are lower.     All meds that can be sent to local [facility] should be, other meds (morphine, oxycodone, and cmpd cream) to be dropped at [facility]. She notes she is out of medication Osvaldo 3/24.    Last refill Voltaren gel: 5/25/2018  Last refill zofran ODT: 8/29/2019  Last refill imitrex: 8/29/2019  Last refill celebrex: 9/14/2018  Last refill diazepam: 1/30/2019  Last refill cmpd cream: 1/16/2019  Last refill morphine: 2/22/2019  Last refill oxycodone: 2/22/2019  Last office visit: 11/28/2019  Scheduled for follow up 4/10/2019 at 11AM    [place]  Report reviewed, will route to MD for review.   *Morphine updated to reflect patient's report she is taking 1 tab BID, oxycodone script was not changed.

## 2019-03-20 NOTE — TELEPHONE ENCOUNTER
----- Message from Ana Lanier sent at 3/20/2019  4:02 PM CDT -----  Called this pt and offered her the 11am spots. She stated she would like a call from you, I was unable to schedule. She didn't say why but I think 11am is too early.     Ana SOTO  Clinic Coordinator  Second Floor [facility]  Breast\Gyn   [phone]    ----- Message -----  From: Radha Gonzalez RN  Sent: 3/20/2019   3:23 PM  To: Ana Perez -    You could offer [place] Wednesday next week at 11AM with Emmanuel? Or another Wednesday that he doesn't have an 11AM we would add that in or I will break up a NEW slot.    Thanks!  Radha

## 2019-03-21 RX ORDER — CELECOXIB 200 MG/1
200 CAPSULE ORAL 2 TIMES DAILY
Qty: 60 CAPSULE | Refills: 2 | Status: SHIPPED | OUTPATIENT
Start: 2019-03-21 | End: 2020-10-07

## 2019-03-21 RX ORDER — DIAZEPAM 5 MG
TABLET ORAL
Qty: 90 TABLET | Refills: 1 | Status: SHIPPED | OUTPATIENT
Start: 2019-03-21 | End: 2019-05-14

## 2019-03-21 RX ORDER — MORPHINE SULFATE 30 MG/1
30 TABLET, FILM COATED, EXTENDED RELEASE ORAL EVERY 12 HOURS
Qty: 60 TABLET | Refills: 0 | Status: SHIPPED | OUTPATIENT
Start: 2019-03-23 | End: 2019-05-15

## 2019-03-21 RX ORDER — ONDANSETRON 8 MG/1
8 TABLET, ORALLY DISINTEGRATING ORAL EVERY 8 HOURS PRN
Qty: 30 TABLET | Refills: 1 | Status: SHIPPED | OUTPATIENT
Start: 2019-03-21 | End: 2019-05-14

## 2019-03-21 RX ORDER — SUMATRIPTAN 50 MG/1
50 TABLET, FILM COATED ORAL PRN
Qty: 30 TABLET | Refills: 3 | Status: SHIPPED | OUTPATIENT
Start: 2019-03-21 | End: 2019-05-14

## 2019-03-21 RX ORDER — OXYCODONE HYDROCHLORIDE 30 MG/1
30 TABLET ORAL EVERY 4 HOURS PRN
Qty: 97 TABLET | Refills: 0 | Status: SHIPPED | OUTPATIENT
Start: 2019-03-23 | End: 2019-05-15

## 2019-03-21 RX ORDER — POTASSIUM CHLORIDE 1500 MG/1
20 TABLET, EXTENDED RELEASE ORAL 2 TIMES DAILY
Qty: 60 TABLET | Refills: 3 | Status: SHIPPED | OUTPATIENT
Start: 2019-03-21 | End: 2019-07-24

## 2019-03-21 RX ORDER — FLUTICASONE PROPIONATE 50 MCG
1-2 SPRAY, SUSPENSION (ML) NASAL DAILY
Qty: 16 G | Refills: 3 | Status: SHIPPED | OUTPATIENT
Start: 2019-03-21 | End: 2019-07-25

## 2019-03-21 NOTE — TELEPHONE ENCOUNTER
Called and spoke to patient - advised of approved scripts. Advised all scripts that can be e-scribed + diazepam sent to her preferred [facility].     Reviewed cmpd cream + morphine & oxycodone have been dropped at [facility]. Advised oxycodone quantity staying the same this month and morphine reduced to #60 tabs (30mg BID) per her report this is what she is taking now. Advised of forward date on those scripts.     She verbalized understanding.

## 2019-03-25 RX ORDER — MONTELUKAST SODIUM 10 MG/1
20 TABLET ORAL 2 TIMES DAILY
Qty: 120 TABLET | Refills: 11 | Status: SHIPPED | OUTPATIENT
Start: 2019-03-25 | End: 2020-03-03

## 2019-03-25 RX ORDER — FUROSEMIDE 20 MG
20 TABLET ORAL 2 TIMES DAILY
Qty: 90 TABLET | Refills: 3 | Status: SHIPPED | OUTPATIENT
Start: 2019-03-25 | End: 2022-06-20

## 2019-03-26 ENCOUNTER — TELEPHONE (OUTPATIENT)
Dept: ONCOLOGY | Facility: CLINIC | Age: 59
End: 2019-03-26

## 2019-03-26 NOTE — TELEPHONE ENCOUNTER
"Encounter reviewed by Dr. Borges and clinic manager Nguyen Harris. Per Nguyen, recommending SANJANA visit tomorrow for further assessment of headache symptoms.     Writer contacted patient to offer visit with SANJANA tomorrow to further assess headache symptoms. Patient accepted appointment with SANJANA stating, \"If my second opinion calls me back today I'll be calling to cancel my appointment tomorrow.\" Care team updated.     Addendum: COTY Torres will be seeing pt in clinic 3/27. Per Roly, pt should come for labs prior to visit. Writer contacted pt 3/26 at 1630 to inform her of lab appointment. Advised her lab orders were not entered yet, but she could call triage in the morning to confirm which labs would be ordered. When scheduling department contacted patient to inform her of appointment time pt voiced frustration, stating writer was supposed to follow-up with her that evening and inform her of which labs were ordered. Writer contacted pt 3/27/19 at 0800 to inform her which labs have been ordered. Reiterated writer had specified pt would not be getting a call until 3/27. Patient stated, \"Well who did I speak to last night? She couldn't tell me anything or help me at all.\" Writer informed pt scheduling department staff are not RNs and can not advise on medical information.    Addendum 3/27/19 1130: Per scheduling department, \"Pt called to cancel her appts today. I was not able to get more information from her other than \"I am not coming in today.\" Writer notified care team and clinic manager.    CASSIDY Longonic Triage     "

## 2019-03-26 NOTE — TELEPHONE ENCOUNTER
"I called and spoke with Elvin last evening around 630 pm.  She expressed frustration with her care.  She reports that she does not feel well and is very concerned that her cancer is back.  She complained of chest pain that is \"different from her usual pain\" and more like when she was diagnosed with lymphoma.  SHe also complained of headaches.  She has not had any adjustments in her pain medications for the last month.      We reviewed that she has been in the ER twice in the last week.  CT scans of the abd/pelvis were unremarkable.  She was subsequently seen in clinic and the ER and had a CT chest and ct neck revealing no concerning findings and no PE.      She inquired about a PET scan or brain MRI.    I advised she come into clinic this morning to evaluated.  I am not available, and I discussed this with her.  I did call Dr Sauceda to notify her but I have not been able to get in touch with her at this time either.    Shahla Crawley  "

## 2019-03-26 NOTE — TELEPHONE ENCOUNTER
"Triage received request that patient be seen in clinic today from Dr. Crawley. Writer discussed with Radha Gonzalez, RNCC for palliative care who reports there are no palliative care providers in clinic today.     Writer discussed with COTY Rolon (only available SANJANA appointment today at 5:50PM). Evelia reviewed recent workup and imaging from 3/15/19 visit with COTY Chadwick. Per Evelia, SANJANA would not have anything further to offer patient at this time. Advised checking with Dr. Sauceda to see if she is willing to see pt in clinic tomorrow (since Dr. Crawley is unavailable today).     Writer discussed with Dr. Sauceda, who also reviewed recent clinic visits and imaging. Per Dr. Sauceda, would not be beneficial to see pt in clinic tomorrow as there have been no finding suggestive of residual or recurrent lymphoma. Dr. Sauceda recommends patient follow-up with palliative care or pain management clinic to discuss pain and Dr. Ma's recommendation that she do inpatient detoxification and transition to Suboxone.    Writer further discussed with Radha Cordova, DANIEL who will contact Dr. Benitez about possible add on appointment tomorrow in clinic. Per Radha, earlier appointments have already been offered with Palliative and patient has been refusing to schedule any sooner.     After discussion with palliative, writer will offer appointment at 0900 or 1100 tomorrow 3/27/19 with Dr. Benitez.    Writer contacted patient to offer appointment with palliative. Patient became very upset over the phone and adamantly refused to schedule with palliative care. Patient states, \"I know it's not a palliative care issue.\" \"I'll go to another provider, [facility] or [facility].\" \"I'm not seeing Dr. Benitez, he's the one who caused this.\" Patient reports Dr. Crawley told her over the phone her recommendation was to schedule visit with Dr. Sauceda in clinic. Patient states seeing a hematologist was also the " "recommendation of the Leukemia [employer] Lymphoma Society. Per pt, \"Unless you can get me an appointment with Dr. Sauceda or Dr. Crawley, I'm out of here.\"      Encounter routed to care team and management for review and recommendations on plan.     CASSIDY Long Triage    "

## 2019-03-26 NOTE — TELEPHONE ENCOUNTER
See 3/26/19 telephone encounters to follow-up to [employer] message.     Harini Nur RN   [facility]

## 2019-03-26 NOTE — TELEPHONE ENCOUNTER
----- Message from Shahla Crawley MD sent at 3/26/2019  8:36 AM CDT -----  Regarding: needs appt today  Please arrange for Elvin to be seen in clinic today.  She said she would be available by phone and can come in anytime.      She is struggling at home with pain - headaches, chest pain.      I reviewed with her that her CT scans have been negative.  Advised she come in for further evaluation.

## 2019-03-27 ENCOUNTER — TELEPHONE (OUTPATIENT)
Dept: FAMILY MEDICINE | Facility: CLINIC | Age: 59
End: 2019-03-27

## 2019-03-28 ENCOUNTER — TELEPHONE (OUTPATIENT)
Dept: PALLIATIVE CARE | Facility: CLINIC | Age: 59
End: 2019-03-28

## 2019-03-28 ENCOUNTER — TRANSFERRED RECORDS (OUTPATIENT)
Dept: HEALTH INFORMATION MANAGEMENT | Facility: CLINIC | Age: 59
End: 2019-03-28

## 2019-03-28 NOTE — TELEPHONE ENCOUNTER
A prior authorization is needed for the following medication prescribed.  Please complete a prior authorization with the information included below.    Medication: FV-Ketamine 6%/Lidocaine 10% in PLO Cream (compounded)  Ingredients: Ketamine Powd [address]-59, Lidocaine Powd [address]-83, Salt stable cream [address]-13  RX #: [id number]  Reason for Rejection: Product/service not covered    Pharmacy Insurance plan: [employer]  BIN #: [id number]  ID #: [id number]  PCN #:[id number]  Phone #: [phone]    Pharmacy NPI:[id number]      Please advise the pharmacy when the prior authorization is approved or denied.     Thank you for your time.    [facility]  [phone]

## 2019-03-29 NOTE — TELEPHONE ENCOUNTER
PA Initiation    Medication: SUMAtriptan (IMITREX) 50 MG tablet -   Insurance Company: [employer] - Phone [phone] Fax [phone]  Pharmacy Filling the Rx: [id number] - [place], [place] - [address] [address]  Filling Pharmacy Phone: [phone]  Filling Pharmacy Fax: [phone]  Start Date: 3/29/2019

## 2019-04-01 ENCOUNTER — TRANSFERRED RECORDS (OUTPATIENT)
Dept: HEALTH INFORMATION MANAGEMENT | Facility: CLINIC | Age: 59
End: 2019-04-01

## 2019-04-01 NOTE — TELEPHONE ENCOUNTER
PA Initiation    Medication: FV-Ketamine 6%/Lidocaine 10% in PLO Cream (compounded)  Insurance Company: [employer] - Phone [phone] Fax [phone]  Pharmacy Filling the Rx: [facility] - [place], [place] - [address]  Filling Pharmacy Phone: [phone]  Filling Pharmacy Fax:    Start Date: 4/1/2019

## 2019-04-01 NOTE — TELEPHONE ENCOUNTER
PA Initiation    Medication: ondansetron (ZOFRAN-ODT) 8 MG ODT tab -   Insurance Company: [employer] - Phone [phone] Fax [phone]  Pharmacy Filling the Rx: [id number] - [place], [place] - [address] [address]  Filling Pharmacy Phone: [phone]  Filling Pharmacy Fax: [phone]  Start Date: 4/1/2019

## 2019-04-03 NOTE — TELEPHONE ENCOUNTER
Prior Authorization Approval    Authorization Effective Date: 3/2/2019  Authorization Expiration Date: 9/28/2019  Medication: ondansetron (ZOFRAN-ODT) 8 MG ODT tab - APPROVED  Approved Dose/Quantity: 30/10 days (was submitted)  Reference #: CaseId:[id number]   Insurance Company: [employer] - Phone [phone] Fax [phone]  Expected CoPay:       CoPay Card Available:      Foundation Assistance Needed:    Which Pharmacy is filling the prescription (Not needed for infusion/clinic administered): [id number] - [place], [place] - [address] [address]. [place]  Pharmacy Notified: Yes  Patient Notified: Comment:  **Pharmacy will notify patient when script is ready for .**

## 2019-04-03 NOTE — TELEPHONE ENCOUNTER
Prior Authorization Approval    Authorization Effective Date: 2/27/2019  Authorization Expiration Date: 3/28/2020  Medication: SUMAtriptan (IMITREX) 50 MG tablet - APPROVED  Approved Dose/Quantity:   Reference #: CaseId:[id number]   Insurance Company: [employer] - Phone [phone] Fax [phone]  Expected CoPay:       CoPay Card Available:      Foundation Assistance Needed:    Which Pharmacy is filling the prescription (Not needed for infusion/clinic administered): [id number] - [place], [place] - [address] [address]  Pharmacy Notified: Yes  Patient Notified: Comment:  **Pharmacy will notify patient when script is ready for .**

## 2019-04-04 ENCOUNTER — TELEPHONE (OUTPATIENT)
Dept: FAMILY MEDICINE | Facility: CLINIC | Age: 59
End: 2019-04-04

## 2019-04-04 ENCOUNTER — TRANSFERRED RECORDS (OUTPATIENT)
Dept: HEALTH INFORMATION MANAGEMENT | Facility: CLINIC | Age: 59
End: 2019-04-04

## 2019-04-04 ENCOUNTER — TELEPHONE (OUTPATIENT)
Dept: PALLIATIVE MEDICINE | Facility: CLINIC | Age: 59
End: 2019-04-04

## 2019-04-04 DIAGNOSIS — C73 THYROID CANCER (H): ICD-10-CM

## 2019-04-04 DIAGNOSIS — C73 PAPILLARY CARCINOMA, FOLLICULAR VARIANT (H): ICD-10-CM

## 2019-04-04 DIAGNOSIS — E89.2 POSTSURGICAL HYPOPARATHYROIDISM (H): ICD-10-CM

## 2019-04-04 DIAGNOSIS — E89.0 POSTSURGICAL HYPOTHYROIDISM: ICD-10-CM

## 2019-04-04 LAB
CREAT SERPL-MCNC: 0.82 MG/DL
GFR SERPL CREATININE-BSD FRML MDRD: >60 ML/MIN/1.73M2
GLUCOSE SERPL-MCNC: 102 MG/DL (ref 65–100)
POTASSIUM SERPL-SCNC: 3.8 MMOL/L (ref 3.5–5)

## 2019-04-04 NOTE — TELEPHONE ENCOUNTER
Pharmacy comments: need clarification, Cholecalciferol (VITAMIN D3) says 2000 unit cap but sig says 5000 units daily. Please clarify. Thank you  MOHAN Mack

## 2019-04-04 NOTE — TELEPHONE ENCOUNTER
Received 4-4-2019 via fax    Incoming referral - and progress notes dos 3- - received from Dr. Haroldo Hudson ([facility]). Patient was referred to [facility] from pcp (Dr. Pablo Zarco at [facility]). Requests [facility] location if possible. Per TT, ok to process -- please request a referral from pcp. Routing to the Scheduling Coordinators.     [facility] ([facility]) Phone Number: [phone]   [facility] ([facility]) Fax Number: [phone]      Indiana Yantis  Patient Representative  [facility]

## 2019-04-08 NOTE — TELEPHONE ENCOUNTER
PRIOR AUTHORIZATION DENIED    Medication: FV-Ketamine 6%/Lidocaine 10% in PLO Cream (compounded) - denied    Denial Date: 4/8/2019    Denial Rational: compound is denied because one or more of the ingredients is not covered or available OTC                Appeal Information:

## 2019-04-08 NOTE — TELEPHONE ENCOUNTER
Patient is already a patient of the pain clinic. Last seen 1/17 with Dr. Ma. Patient did not reply to multiple requests to return phone calls/schedule follow up.         Jessica Fisher    [facility]

## 2019-04-09 RX ORDER — ACETAMINOPHEN 160 MG
2000 TABLET,DISINTEGRATING ORAL DAILY
Qty: 90 CAPSULE | Refills: 1 | Status: SHIPPED | OUTPATIENT
Start: 2019-04-09 | End: 2019-10-11

## 2019-04-11 ENCOUNTER — TELEPHONE (OUTPATIENT)
Dept: PALLIATIVE CARE | Facility: CLINIC | Age: 59
End: 2019-04-11

## 2019-04-11 NOTE — TELEPHONE ENCOUNTER
"Patient left VM for me asking for call back - prior to be receiving this VM she also called triage wanting to speak with team. MD called patient back, however patient disconnected call.    Patient called again, I was able to speak with her. She tells me that she is upset that she tried to reach the [facility] over the weekend for withdrawal, but no one answered the phone (?) or was able to get her the information she needed. I asked for the phone numbers that she called, she was not willing to provide this to me.     She tells me that she is now is withdrawal and feels terrible - advised I thought she should seek care in the ED, she tells me that she is not willing to do this, she will just wait in the ED for hours and that everyone says that is \"not appropriate.\" She was seen and admitted at [facility], but was discharged to home after being set up with psychiatry for suboxone as an outpatient. Is set up for follow up next week. She is not happy to have not been given a choice about which MD to follow up with.     She is upset that MD called her directly - explained this was due to me asking him to call her based on her 2 calls and requests for call back. She tells me that she no longer wants to speak with MD for her care as his care plan has caused her troubles.     Advised I would like to help her and offered to try to reach the [facility] ED, she tells me that she will not go there. She does tell me that she is willing to speak with Dr. Shell's team, I am unclear if she has tried to contact them.     Called Dr. Shell's office and was able to speak directly with Dr. Shell who offered to see patient in next available apt (which is April 23rd at 9AM in [place]). Recommendation is for [facility] ED and for admission to detox. Was provided with detox phone # ([phone]) to call and see if bed is available for detox.     Called detox line, unfortunately no bed available at this time. They advised that detox phone " "line is staffed 24/7 and advised patients can call as often as they would like to try to find a bed. Unable to predict bed openings, but best time to call is the morning. Recommendation again is for patients to report to [facility] ED if they feel they need urgent/emergent medical care. Asked for other hospitals in area with detox units, unfortunately there are not many - [place] is an option (where patient already was).     Called patient back - explained the above. Advised Dr. Shell's next available apt is April 23rd at 9AM in [place] and that I requested this be scheduled for her. She is unhappy with how far out the apt is, but indicated she wanted to keep the apt. She is also upset that she is not getting a phone call directly from Dr. Shell. She reports that she has tried to contact Dr. Shell's office, however I am unable to locate any notes about this. Listened to her and offered support. Advised I had also called the detox line, but unfortunately no beds are available right now. Advised patient of phone # to call herself to continue to check for beds - she tells me that this is the number she tried all weekend and no one could help her.     She asks me what to do next and I again advised that my recommendation is to continue to try and reach detox unit for a bed/seek care at [facility] ED for emergent symptoms. She expressed not being happy with my recommendations. She repeats, \"I wasn't the one who threw me in acute withdrawal\" and thanked me for the call back, asking only to speak with me if she calls the palliative clinic. Call was disconnected.   "

## 2019-04-11 NOTE — TELEPHONE ENCOUNTER
"I was paged to call her this afternoon, she had called triage.  She had an appointment with me yesterday which she cancelled at the last minute.    I reviewed her care everywhere notes from [facility], [facility] care [facility], etc. [facility] had offered to keep her longer for ongoing inpatient care for her withdrawal and symptom management but per their records that involved a locked powers and she declined to be transferred there for continued inpatient treatment.     She expressed anger that I had called her.    I asked if I could help her.  She expressed anger about calling \"[facility]\" this weekend and not being connected with a doctor. I asked if she had called our on call answering service and she said she didn't know.    She reported she is still withdrawing and I note she spoke with her psychiatrist yesterday about this. She told me he told her to call me about her withdrawal symptoms and I expressed surprise that he would do that since he is treating her with suboxone and other meds for withdrawal. She repeatedly asked me questions about how I was going to fix her intractable problems for which she is in the midst of a huge treatment shift which I haven't been directly involved with, then accuse me of not caring about her 'your silence is all I need to know about you' when I hesitated to form an answer to her challenging questions. She eventually hung up on me.   "

## 2019-04-17 ENCOUNTER — TRANSFERRED RECORDS (OUTPATIENT)
Dept: HEALTH INFORMATION MANAGEMENT | Facility: CLINIC | Age: 59
End: 2019-04-17

## 2019-04-17 ENCOUNTER — TELEPHONE (OUTPATIENT)
Dept: FAMILY MEDICINE | Facility: CLINIC | Age: 59
End: 2019-04-17

## 2019-04-17 NOTE — TELEPHONE ENCOUNTER
Reason for Call:  Other ER visit    Detailed comments: Patient was at [place] [facility] er today for adverse effects of medication. Told to call Dr. Soto today. Would like to speak with someone tonight. Please call    Phone Number Patient can be reached at: Home number on file [phone] (home)    Best Time: ASAP    Can we leave a detailed message on this number? YES    Call taken on 4/17/2019 at 5:58 PM by Ml Naranjo

## 2019-04-17 NOTE — TELEPHONE ENCOUNTER
Called and spoke with patient. Patient very rude on phone and was expecting call from Dr. Zarco directly.  Patient is requesting to speak with Dr. Zarco. Offered to schedule an appointment to discuss ED follow-up.   Patient states she was told by ED and Dr. Zarco that she can call to talk with him regarding issues. Advised her this usually isn't done over a phone conversation and requires and OV.   Patient again stated she wanted to speak to Dr. Zarco regarding suboxon and ED visits.     Routing to PCP.     Ariadna Engle RN

## 2019-04-19 ENCOUNTER — OFFICE VISIT (OUTPATIENT)
Dept: FAMILY MEDICINE | Facility: CLINIC | Age: 59
End: 2019-04-19
Payer: COMMERCIAL

## 2019-04-19 VITALS
RESPIRATION RATE: 20 BRPM | HEART RATE: 125 BPM | BODY MASS INDEX: 29.35 KG/M2 | WEIGHT: 176.4 LBS | DIASTOLIC BLOOD PRESSURE: 92 MMHG | OXYGEN SATURATION: 97 % | SYSTOLIC BLOOD PRESSURE: 144 MMHG | TEMPERATURE: 98.6 F

## 2019-04-19 DIAGNOSIS — Z87.448 HISTORY OF CYSTOCELE: ICD-10-CM

## 2019-04-19 DIAGNOSIS — N95.2 ATROPHIC VAGINITIS: ICD-10-CM

## 2019-04-19 DIAGNOSIS — R30.0 DYSURIA: ICD-10-CM

## 2019-04-19 DIAGNOSIS — N36.8 URETHRAL IRRITATION: Primary | ICD-10-CM

## 2019-04-19 DIAGNOSIS — Z87.19 HISTORY OF BILIARY DISEASE: ICD-10-CM

## 2019-04-19 LAB
ALBUMIN UR-MCNC: NEGATIVE MG/DL
APPEARANCE UR: CLEAR
BACTERIA #/AREA URNS HPF: ABNORMAL /HPF
BILIRUB UR QL STRIP: NEGATIVE
COLOR UR AUTO: YELLOW
GLUCOSE UR STRIP-MCNC: NEGATIVE MG/DL
HGB UR QL STRIP: ABNORMAL
KETONES UR STRIP-MCNC: NEGATIVE MG/DL
LEUKOCYTE ESTERASE UR QL STRIP: NEGATIVE
NITRATE UR QL: NEGATIVE
NON-SQ EPI CELLS #/AREA URNS LPF: ABNORMAL /LPF
PH UR STRIP: 6.5 PH (ref 5–7)
RBC #/AREA URNS AUTO: ABNORMAL /HPF
SOURCE: ABNORMAL
SP GR UR STRIP: 1.01 (ref 1–1.03)
SPECIMEN SOURCE: NORMAL
UROBILINOGEN UR STRIP-ACNC: 0.2 EU/DL (ref 0.2–1)
WBC #/AREA URNS AUTO: ABNORMAL /HPF
WET PREP SPEC: NORMAL
WET PREP SPEC: NORMAL

## 2019-04-19 PROCEDURE — 87210 SMEAR WET MOUNT SALINE/INK: CPT | Performed by: FAMILY MEDICINE

## 2019-04-19 PROCEDURE — 81001 URINALYSIS AUTO W/SCOPE: CPT | Performed by: FAMILY MEDICINE

## 2019-04-19 PROCEDURE — 99214 OFFICE O/P EST MOD 30 MIN: CPT | Performed by: FAMILY MEDICINE

## 2019-04-19 ASSESSMENT — ASTHMA QUESTIONNAIRES
ACUTE_EXACERBATION_TODAY: NO
QUESTION_5 LAST FOUR WEEKS HOW WOULD YOU RATE YOUR ASTHMA CONTROL: COMPLETELY CONTROLLED
ACT_TOTALSCORE: 24
QUESTION_4 LAST FOUR WEEKS HOW OFTEN HAVE YOU USED YOUR RESCUE INHALER OR NEBULIZER MEDICATION (SUCH AS ALBUTEROL): NOT AT ALL
QUESTION_1 LAST FOUR WEEKS HOW MUCH OF THE TIME DID YOUR ASTHMA KEEP YOU FROM GETTING AS MUCH DONE AT WORK, SCHOOL OR AT HOME: NONE OF THE TIME
QUESTION_2 LAST FOUR WEEKS HOW OFTEN HAVE YOU HAD SHORTNESS OF BREATH: NOT AT ALL
QUESTION_3 LAST FOUR WEEKS HOW OFTEN DID YOUR ASTHMA SYMPTOMS (WHEEZING, COUGHING, SHORTNESS OF BREATH, CHEST TIGHTNESS OR PAIN) WAKE YOU UP AT NIGHT OR EARLIER THAN USUAL IN THE MORNING: ONCE OR TWICE

## 2019-04-19 NOTE — PROGRESS NOTES
"  SUBJECTIVE:   Tisha Arias is a 58 year old female who presents to clinic today for the following   health issues:    Chief Complaint   Patient presents with     Vaginal Problem     Swollen lymph node ; X1 month     Started coming off opiates end of February and beginning of march, she felt her lymph nodes were enlarging.  Felt that \"everything was swelling\"  Predominantly in the vaginal area.  Felt a \"lump, maybe a lymph node in the vaginal area\".  Was large on Monday, could feel it on the outside, irritating when trying to walk.  Happened last in 2013 when she had serotonin syndrome, she was started on estrogen cream, however oncologist stopped the medication as she was being treated for breast cancer.  She was diagnosed with cystocele, then saw OB gyn and was diagnosed with vaginal hypertrophy.  She currently feels sensation of vaginal mass/swelling and has had some dysuria.    Additional history: as documented    Reviewed  and updated as needed this visit by clinical staff  Tobacco  Allergies  Meds  Problems  Med Hx  Surg Hx  Fam Hx         Reviewed and updated as needed this visit by Provider  Tobacco  Allergies  Meds  Problems  Med Hx  Surg Hx  Fam Hx         BP Readings from Last 3 Encounters:   04/23/19 (!) 149/100   04/19/19 (!) 144/92   03/15/19 (!) 151/98    Wt Readings from Last 3 Encounters:   04/19/19 80 kg (176 lb 6.4 oz)   03/15/19 78.5 kg (173 lb)   03/11/19 80.4 kg (177 lb 3.2 oz)                    ROS:  Constitutional, HEENT, cardiovascular, pulmonary, gi and gu systems are negative, except as otherwise noted.    OBJECTIVE:     BP (!) 144/92   Pulse 125   Temp 98.6  F (37  C) (Oral)   Resp 20   Wt 80 kg (176 lb 6.4 oz)   SpO2 97%   BMI 29.35 kg/m    Body mass index is 29.35 kg/m .  GENERAL: healthy, alert and no distress  EYES: Eyes grossly normal to inspection, PERRL and conjunctivae and sclerae normal  NECK: no adenopathy, no asymmetry, masses, or scars and thyroid " normal to palpation  RESP: lungs clear to auscultation - no rales, rhonchi or wheezes  CV: regular rate and rhythm, normal S1 S2, no S3 or S4, no murmur, click or rub, no peripheral edema and peripheral pulses strong  ABDOMEN: soft, nontender, no hepatosplenomegaly, no masses and bowel sounds normal   (female): speculum not used due to anticipated discomfort, bimanual reveals no obvious intravaginal mass, however has significantly swollen/erythematous/tender urethral meatus.  SKIN: no suspicious lesions or rashes  PSYCH: mentation appears normal, affect normal/bright    ASSESSMENT/PLAN:       ICD-10-CM    1. Urethral irritation N36.8 UA with Microscopic reflex to Culture   2. Atrophic vaginitis N95.2 Wet prep   3. History of cystocele Z87.448 UROLOGY ADULT REFERRAL   4. Dysuria R30.0    5. History of biliary disease Z87.19 GASTROENTEROLOGY ADULT REF CONSULT ONLY     UA with small amount of blood and bacteria however no other signs of UTI, sensation of mass is likely due to urethritis, will give course of antibiotic, will refer to urology given presumed history of cystocele.  Patient requests referral to GI for history of biliary disease as well.    Follow up if symptoms worsen or fail to improve.     Pablo Zarco MD  [facility]

## 2019-04-19 NOTE — LETTER
[facility]  [address] [place] [address]    April 22, 2019    Tisha Arias  [address]  [place] [address]          Dear Tisha,    Along with urethral swelling on your vaginal exam, you have some bacteria in your urine as well as a little bit of blood.  This could possibly be due to a UTI.  I'll send in an antibiotic for you to take.  Please be sure to follow-up with urology.    Enclosed is a copy of your results.     Results for orders placed or performed in visit on 04/19/19   UA with Microscopic reflex to Culture   Result Value Ref Range    Color Urine Yellow     Appearance Urine Clear     Glucose Urine Negative NEG^Negative mg/dL    Bilirubin Urine Negative NEG^Negative    Ketones Urine Negative NEG^Negative mg/dL    Specific Gravity Urine 1.010 1.003 - 1.035    pH Urine 6.5 5.0 - 7.0 pH    Protein Albumin Urine Negative NEG^Negative mg/dL    Urobilinogen Urine 0.2 0.2 - 1.0 EU/dL    Nitrite Urine Negative NEG^Negative    Blood Urine Small (A) NEG^Negative    Leukocyte Esterase Urine Negative NEG^Negative    Source Midstream Urine     WBC Urine 0 - 5 OTO5^0 - 5 /HPF    RBC Urine O - 2 OTO2^O - 2 /HPF    Squamous Epithelial /LPF Urine Few FEW^Few /LPF    Bacteria Urine Few (A) NEG^Negative /HPF   Wet prep   Result Value Ref Range    Specimen Description Vagina     Wet Prep       No Trichomonas seen  No clue cells seen  No yeast seen      Wet Prep Rare  WBC'S seen        *Note: Due to a large number of results and/or encounters for the requested time period, some results have not been displayed. A complete set of results can be found in Results Review.       If you have any questions or concerns, please call myself or my nurse at [phone].      Sincerely,        Pablo Soto MD/[facility]

## 2019-04-20 ASSESSMENT — ASTHMA QUESTIONNAIRES: ACT_TOTALSCORE: 24

## 2019-04-22 ENCOUNTER — TELEPHONE (OUTPATIENT)
Dept: FAMILY MEDICINE | Facility: CLINIC | Age: 59
End: 2019-04-22

## 2019-04-22 DIAGNOSIS — N34.2 URETHRITIS: Primary | ICD-10-CM

## 2019-04-22 RX ORDER — CIPROFLOXACIN 250 MG/1
250 TABLET, FILM COATED ORAL 2 TIMES DAILY
Qty: 6 TABLET | Refills: 0 | Status: SHIPPED | OUTPATIENT
Start: 2019-04-22 | End: 2019-04-25

## 2019-04-22 NOTE — TELEPHONE ENCOUNTER
Called and spoke to patient.   Patient is experiencing dysuria and pain in the urethra.   Patient was referred to see urology.   Opening available tomorrow at 1015 patient states she can make it work.   Shavon Bojorquez RN

## 2019-04-22 NOTE — TELEPHONE ENCOUNTER
Reason for call:  Other   Patient called regarding (reason for call): call back  Additional comments: Patient was referred to Dr. Geovany natarajan see if she can get in sooner due to swelling and pain.    Phone number to reach patient:  Home number on file [phone] (home)    Best Time:  ASAP    Can we leave a detailed message on this number?  YES

## 2019-04-23 ENCOUNTER — OFFICE VISIT (OUTPATIENT)
Dept: ADDICTION MEDICINE | Facility: CLINIC | Age: 59
End: 2019-04-23
Payer: COMMERCIAL

## 2019-04-23 VITALS — SYSTOLIC BLOOD PRESSURE: 149 MMHG | DIASTOLIC BLOOD PRESSURE: 100 MMHG | HEART RATE: 75 BPM | OXYGEN SATURATION: 98 %

## 2019-04-23 DIAGNOSIS — F11.20 UNCOMPLICATED OPIOID DEPENDENCE (H): Primary | ICD-10-CM

## 2019-04-23 DIAGNOSIS — R07.89 CHEST WALL PAIN: ICD-10-CM

## 2019-04-23 DIAGNOSIS — C85.10 HIGH GRADE B-CELL LYMPHOMA (H): ICD-10-CM

## 2019-04-23 DIAGNOSIS — Z85.3 PERSONAL HISTORY OF MALIGNANT NEOPLASM OF BREAST: ICD-10-CM

## 2019-04-23 DIAGNOSIS — Z98.84 STATUS POST BARIATRIC SURGERY: ICD-10-CM

## 2019-04-23 PROCEDURE — 99215 OFFICE O/P EST HI 40 MIN: CPT | Performed by: PEDIATRICS

## 2019-04-23 NOTE — PROGRESS NOTES
"  SUBJECTIVE:                                                    BUPRENORPHINE FOLLOW UP:    Tisha Arias is a 58 year old female who presents to clinic today for follow up of Buprenorphine.      Date of last visit:  1/15/2019    [place] Board of Pharmacy Data Base Reviewed:    YES;     3/22/19 Oxycodone 30mg  #97  Dr. Benitez.    Suboxone 8mg film #16  Massa Sucar  Buprenorphine 2 mg tab #24  (3 day)   Maty Rutherford          HPI: Patient is seen today in follow-up.  She is accompanied by her .  Her appointment had been made after a phone call to another provider's nursing staff led to that providers office reaching out to this office.  An appointment was placed on the books that the patient was unaware of but find out about through my chart.  She did decide to attend.    Patient was seen by this provider on 1/15/19.  Patient was initially referred by Dr. Benitez for difficulty tapering from opioids.  She has a history of multiple cancers including breast cancer with mastectomy and reconstruction with ongoing myofascial pain in the chest wall. History obtained at last visit as below      1/30/2012 \" Dx Breast cancer with bilateral mastectomy. Since than has been having chest pain localized in several locations.   Was treated with Percocet 5/325 6 tab /day and Valium 5mg tid.  That continued until November 2012.    Seen [facility] Dr. Dowd started gabapentin, had swelling and chest pain- increased gabapentin and Baclofen.   Saw different MD tx with Topomax and stopped Gabapentin.  RX Clonidine and amytryptylline/Baclofen but was noted to have had tremors, possible seizures.   Was seen by Neurology (serotonin syndrome).  Out of work at that point for  for four months.   Rx Oxycodone 5mg q four hours up until dx Lymphoma 8/29/2017.  High dose chemo with Interthecal.  Pain increased.  Given MS during this time 30mg bid and 60mg q hs.  (0550-1153).   Finished chemo 1/24/2018.    Post chemo pain " "was worse and has been ongoing and exacerbated by recent implant removal due to contractures.    Provider Started decrease 10/18 per pain med of Oxycodone from 6 tab /day to current 3 tab /day (current rx appears to be written for #120 suggesting 4/day)   Currently reports taking MS 30mg am and mid day and 60mg hs and Oxycodone 5mg 3 x day.   Valium 5mg 3 x day.  \"     Since last visit patient has been to see several other providers.  She did see Dr. Ma in pain/addiction medicine at [facility] for consideration of other recommendations beyond the buprenorphine that was recommended by this provider.  She did not find that visit helpful.  Dr. Ma concurred with recommendation for buprenorphine.  Patient subsequently did experience opioid withdrawal after not having further medication and was hospitalized for detoxification.  She was initiated on Suboxone and then followed by another provider who changed to Subutex because the patient was experiencing side effects.  She reports continuing to have those side effects on the Subutex and has subsequently been recommended to wean.  She weaned from 12 mg of Subutex to none and has currently been off any buprenorphine for the last several days.  She has been receiving Vistaril and clonidine for discomfort.  She awoke this morning feeling fairly well but took Vistaril and Flexeril and reports feeling agitated and a sensation of tingling etc.  She brought a paper with a long list of symptoms which was recorded by nursing staff and available for review.    Patient reports significant dissatisfaction with previous providers.  She reports concern that she was placed on a medication that has potential serotonin effects when she has a previous history of serotonin syndrome.  Discussed that unfortunately buprenorphine is 1 of the few tools we have available other than full opioid agonists.    She reports that she has concerns about providers sharing information when she has " rescinded releases.  She also has issue that she feels many of the things that are in her chart are inaccurate.    She is followed by Dr. Luna is a primary care physician.      Social History     Social History Narrative        Stopped working at [employer] in Nov 2018.,  Currently on medical leave.  Afraid she will lose position if she can not return to work in March as planned.      Lives with .  Two adult children.  Granddaughter age 1        Patient Active Problem List    Diagnosis Date Noted     Opioid-induced hyperalgesia 01/17/2019     Priority: Medium     Long term current use of opiate analgesic 12/11/2018     Priority: Medium     Kidney stone 11/26/2018     Priority: Medium     Bilateral lower extremity edema 02/13/2018     Priority: Medium     Diffuse large B cell lymphoma (H) 01/18/2018     Priority: Medium     DLBCL (diffuse large B cell lymphoma) (H) 10/06/2017     Priority: Medium     High grade B-cell lymphoma (H) 09/26/2017     Priority: Medium     Personal history of malignant neoplasm of breast 01/23/2017     Priority: Medium     Status post bariatric surgery 11/02/2016     Priority: Medium     Hypocalcemia 11/02/2016     Priority: Medium     Migraine without status migrainosus, not intractable, unspecified migraine type 10/03/2016     Priority: Medium     Rash 08/07/2016     Priority: Medium     Intertrigo 08/03/2016     Priority: Medium     Chest wall pain 04/20/2016     Priority: Medium     Mild intermittent asthma without complication 12/21/2015     Priority: Medium     Idiopathic mast cell activation syndrome (H) 11/03/2015     Priority: Medium     Chronic pain disorder 11/03/2015     Priority: Medium     Vitamin D deficiency 08/21/2015     Priority: Medium     Chronic pain, post bilateral mastectomies and breast reconstruction  08/21/2015     Priority: Medium     Patient is followed by Palliative care for ongoing prescription of pain medication.  All refills  should be approved by this provider, or covering partner.    Medication(s): morphine BID  Maximum quantity per month: Per palliative care   Clinic visit frequency required:       Controlled substance agreement on file: No    Pain Clinic evaluation in the past: Yes       Location(s):  [facility] Total Score(s):  No flowsheet data found.    Last [facility] website verification:  none   [email or web address]         Compound heterozygous MTHFR mutation [id number] (H) 04/13/2015     Priority: Medium     Metastasis to cervical lymph node (H) 09/26/2014     Priority: Medium     S/P breast reconstruction 05/28/2014     Priority: Medium     S/P thyroidectomy 07/10/2013     Priority: Medium     Postsurgical subclinical hypoparathyroidism (H) 07/10/2013     Priority: Medium     Postsurgical hypothyroidism 07/10/2013     Priority: Medium     Papillary carcinoma, follicular variant (H) 06/28/2013     Priority: Medium     Medication side effects 06/28/2013     Priority: Medium     Baclofen and topamax       Serotonin neurotoxicity 06/20/2013     Priority: Medium     Neuropathic pain of chest 05/30/2013     Priority: Medium     Atrophic vaginitis 05/06/2013     Priority: Medium     History of migraine headaches 05/01/2013     Priority: Medium     Intestinal malabsorption 05/01/2013     Priority: Medium     History of gastric bypass 05/01/2013     Priority: Medium     Lymphedema of breast 03/06/2013     Priority: Medium     Family history of uterine cancer 07/26/2012     Priority: Medium     Infection 03/06/2012     Priority: Medium     Cellulitis 03/06/2012     Priority: Medium     Breast cancer (H) 02/09/2012     Priority: Medium     DCIS, bilateral mastectomies, 1/2012       DCIS (ductal carcinoma in situ) of right breast 12/29/2011     Priority: Medium     CARDIOVASCULAR SCREENING; LDL GOAL LESS THAN 160 06/09/2011     Priority: Medium     Seasonal allergic rhinitis      Priority: Medium     Allergic rhinitis due to  animal dander      Priority: Medium     Food intolerance      Priority: Medium       Problem list and histories reviewed & adjusted, as indicated.  Additional history: as documented        Current Outpatient Medications on File Prior to Visit:  ACAI RAMIREZ PO Take 50 mg by mouth   acetaminophen (TYLENOL) 325 MG tablet Take 2 tablets (650 mg) by mouth every 4 hours as needed for mild pain or fever   aluminum chloride (DRYSOL) 20 % external solution Apply topically At Bedtime (Patient not taking: Reported on 4/19/2019)   bisacodyl (DULCOLAX) 5 MG EC tablet Take 3 tablets (15 mg) by mouth daily as needed for constipation (Patient not taking: Reported on 4/19/2019)   calcitRIOL (ROCALTROL) 0.25 MCG capsule TAKE 2 CAPSULES BY MOUTH EVERY MORNING AND TAKE 1 CAPSULE BY MOUTH EVERY NIGHT AT BEDTIME   calcium carbonate antacid (TUMS ULTRA 1000) 1000 MG CHEW Take 1 tablet (1,000 mg) by mouth 4 times daily as needed (severe muscle cramps)   calcium citrate-vitamin D (CALCIUM CITRATE + D3) 315-250 MG-UNIT TABS per tablet Take 2 tablets by mouth 2 times daily   celecoxib (CELEBREX) 200 MG capsule Take 1 capsule (200 mg) by mouth 2 times daily   cetirizine (ZYRTEC ALLERGY) 10 MG tablet Take 1 tablet (10 mg) by mouth 3 times daily On hold for lab test. (Patient not taking: Reported on 4/19/2019)   Cholecalciferol (VITAMIN D3) 2000 units CAPS Take 2,000 Units by mouth daily   ciprofloxacin (CIPRO) 250 MG tablet Take 1 tablet (250 mg) by mouth 2 times daily for 3 days   colchicine (COLCYRS) 0.6 MG tablet Take 1 tablet (0.6 mg) by mouth 3 times daily   COMPOUND CONTAINING CONTROLLED SUBSTANCE (CMPD RX) - PHARMACY TO MIX COMPOUNDED MEDICATION Ketamine 6%, Lidocaine 10% in PLOApply small amount to area BID PRN   cromolyn (OPTICROM) 4 % ophthalmic solution Place 1 drop into both eyes 4 times daily   cromolyn sodium (NASALCROM) 5.2 MG/ACT AERS Inhaler SPRAY ONE SPRAY( 1 ML) IN NOSTRIL DAILY   [employer] FIBER GUMMY BEARS CHILDREN CHEW Take 5 g  by mouth daily (2 gummy= 5 g =1 serving) (Patient not taking: Reported on 2019)   cyclobenzaprine (FLEXERIL) 10 MG tablet Take 1 tablet (10 mg) by mouth 3 times daily as needed   [] cyclobenzaprine (FLEXERIL) 5 MG tablet Take 1-2 tablets (5-10 mg) by mouth 2 times daily as needed for muscle spasms   diazepam (VALIUM) 5 MG tablet TAKE ONE TABLET ( 5 MG) BY MOUTH THREE TIMES DAILY AS NEEDED FOR MUSCLE SPASMS. FILL 10/08/2018   diclofenac (VOLTAREN) 1 % topical gel Apply affected area two times daily PRN using enclosed dosing card.   EPINEPHrine (EPIPEN 2-CARIDAD) 0.3 MG/0.3ML injection 2-pack Inject 0.3 mLs (0.3 mg) into the muscle once as needed for anaphylaxis (Patient not taking: Reported on 2019)   fluticasone (FLONASE) 50 MCG/ACT nasal spray Spray 1-2 sprays into both nostrils daily   furosemide (LASIX) 20 MG tablet Take 1 tablet (20 mg) by mouth 2 times daily   levothyroxine (SYNTHROID/LEVOTHROID) 112 MCG tablet ON MONDAY THRU SATURDAY TAKE TWO TABLETS DAILY AND ON  TAKE 3 TABLETS DAILY.   lidocaine (XYLOCAINE) 5 % ointment Apply quarter size amount to chest and back up to 3 times daily as needed for pain.   lidocaine-prilocaine (EMLA) cream Apply topically as needed for moderate pain   magic mouthwash (FIRST-MOUTHWASH BLM) suspension Swish and spit 5-10 mLs in mouth every 6 hours as needed for mouth sores (Patient not taking: Reported on 2019)   magnesium 200 MG TABS Take 200 mg by mouth 3 times daily   montelukast (SINGULAIR) 10 MG tablet Take 2 tablets (20 mg) by mouth 2 times daily   morphine (MS CONTIN) 30 MG CR tablet Take 1 tablet (30 mg) by mouth every 12 hours (Patient not taking: Reported on 2019)   naloxone (NARCAN) nasal spray Spray 1 spray (4 mg) into one nostril alternating nostrils as needed for opioid reversal every 2-3 minutes until assistance arrives (Patient not taking: Reported on 2019)   olopatadine (PATANOL) 0.1 % ophthalmic solution Apply 1 drop to eye    ondansetron (ZOFRAN-ODT) 8 MG ODT tab Take 1 tablet (8 mg) by mouth every 8 hours as needed   order for DME Compression socks - knee high 20-30 mmHg zippered if possible   oxyCODONE IR (ROXICODONE) 30 MG tablet Take 1 tablet (30 mg) by mouth every 4 hours as needed for moderate to severe pain . This is a 30day supply (Patient not taking: Reported on 4/19/2019)   polyethylene glycol (MIRALAX) powder Take 17 g (1 capful) by mouth daily (Patient not taking: Reported on 4/19/2019)   potassium chloride ER (KLOR-CON) 20 MEQ CR tablet Take 1 tablet (20 mEq) by mouth 2 times daily   Probiotic Product (PROBIOTIC DAILY PO) Take 1 capsule by mouth daily Lacto acid bifidobacterium   prochlorperazine (COMPAZINE) 10 MG tablet Take 10 mg by mouth as needed   ranitidine (ZANTAC) 75 MG tablet Take 1 tablet (75 mg) by mouth 3 times daily   senna-docusate (SENOKOT-S;PERICOLACE) 8.6-50 MG per tablet Take 1-2 tablets by mouth 2 times daily as needed for constipation   SUMAtriptan (IMITREX) 50 MG tablet Take 1 tablet (50 mg) by mouth as needed for migraine   tamoxifen (NOLVADEX) 20 MG tablet Take 1 tablet (20 mg) by mouth daily   triamcinolone (KENALOG) 0.025 % ointment Apply topically as needed   Triamcinolone Acetonide (AZMACORT IN) Inhale 2 puffs into the lungs as needed   UNABLE TO FIND Take 2 capsules by mouth 3 times daily Muscle Mag. 2 caps contain B1 20mg, B2 20mg, B6 10mg, magesium 20mg, manganese 2mg.   UNABLE TO FIND 3 tablets 3 times daily MEDICATION NAME: calcium D-Glucarate 3 caps contain 180mg of elemental calcium.   UNABLE TO FIND 2 tablets 3 times daily MEDICATION NAME: Digestzymes    UNABLE TO FIND 1 tablet daily MEDICATION NAME: Pure Encapsulations   VENTOLIN  (90 BASE) MCG/ACT Inhaler INHALE 2 PUFFS INTO THE LUNGS EVERY 4 HOURS AS NEEDED FOR SHORTNESS OF BREATH OR DIFFICULT BREATHING OR WHEEZING     No current facility-administered medications on file prior to visit.     Allergies   Allergen Reactions      "Baclofen Other (See Comments) and Unknown     Other reaction(s): Edema, chest pain, seizures.   Other reaction(s): Chest Pain, Edema, Headache  Seizures     Buprenorphine      Other reaction(s): Chest Pain  Difficulty swallowing, GI cramping     Clonidine Other (See Comments)     \"Seizures\"     Duloxetine Anaphylaxis and Other (See Comments)     Flushing, tremor/muscle twitching and edema  Serotonin syndrome  Other reaction(s): Ataxia  Flushing, tremor/muscle twitching and edema  Serotonin syndrome  SOB palpitations itching rash     Methocarbamol Other (See Comments)     Swelling and chest pain  Other reaction(s): Angioedema  Swelling in chest     No Clinical Screening - See Comments Shortness Of Breath, Palpitations, Anaphylaxis, Itching, Swelling, Difficulty breathing and Rash     Sukhjinder wipes- oral allergy -  July 2015: throat tightness from a Chinese herbal medicine Guillen Raciel Tran  Other reaction(s): Edema, Tongue Swelling  Sukhjinder wipes  Has anaphylactic reactions to varied environmental things.  Carries an epi-pen  Oral allergy syndrome enviromental- food intolerances and animal dander birch trees potatoes carrots cherries celery apple pears plums peaches parsnip kiwi hazelnuts apricots     Carries epi pen     Nuts Shortness Of Breath     Other reaction(s): Throat Swelling/Closing  Please check herbal formulas for these allergens prior to prescribing.     Serotonin Anxiety, Other (See Comments) and Swelling     Seizures    Anxiety swelling seizures     Suboxone      Severe chest pain, swelling (face, eyes,feet,legs), All over itching, tightness is throat, fatigue, feels anxious, headache     Amitriptyline Other (See Comments)     Other reaction(s): *Unknown     Amitriptyline Hcl Swelling     Birch Trees      Potatoes, carrots, cherries, celery, apple, pears, plums, peaches, parsnip, kiwi, hazelnuts, and apricots,      Blue Dyes Itching     Headaches    headache       Buprenorphine-Naloxone      Other " reaction(s): Chest Pain  Difficulty swallowing, GI cramping     Codeine Nausea     Vomiting       Cymbalta Other (See Comments)     Flushing, tremor/muscle twitching and edema     Gabapentin Other (See Comments)     edema  Systemic edema, weaned off from Feb to March per Dr. Dowd.    edema  Other reaction(s): Edema  edema  Systemic edema, weaned off from Feb to March per Dr. Dowd.       Grass      Metaxalone      Other reaction(s): Vomiting     Mugwort [Artemisia Vulgaris]      Various spices     Pollen Extract      Other reaction(s): *Unknown  Mugwort, ragweed's melons bananas cucumbers zucchini     Pregabalin      Ragweeds      Melons, bananas, cucumbers, zucchini.     Topamax      Topiramate      Other reaction(s): Ataxia  seotonin syndrome       Nortriptyline Itching, Visual Disturbance, Swelling, GI Disturbance, Anxiety, Other (See Comments) and Nausea     Other reaction(s): Swelling  Other reaction(s): Edema, Headache  Other reaction(s): Swelling         REVIEW OF SYSTEMS:  General: No acute withdrawal symptoms.  No recent infections or fever  Resp: No coughing, wheezing or shortness of breath  CV: No chest pains or palpitations  GI: No nausea, vomiting, abdominal pain, diarrhea, constipation  : No urinary frequency or dysuria,     Musculoskeletal: No significant muscle or joint pains, No edema  Neurologic: No numbness, tingling, weakness, problems with balance or coordination  Psychiatric: No acute concerns  Skin: No rashes    OBJECTIVE:    PHYSICAL EXAM:  BP (!) 149/100 (BP Location: Left arm, Patient Position: Sitting, Cuff Size: Adult Regular)   Pulse 75   SpO2 98%     GENERAL APPEARANCE:  Tearful, holding chest area.   EYES:Eyes grossly normal to inspection  NEURO:  Gait normal.  No tremor. Coordination intact.   MENTAL STATUS EXAM:  Appearance/Behavior: Restless  Speech: Normal  Mood/Affect: depressed affect and anxiety  Insight: Adequate and Fair      Results for orders placed or performed in  visit on 04/19/19   UA with Microscopic reflex to Culture   Result Value Ref Range    Color Urine Yellow     Appearance Urine Clear     Glucose Urine Negative NEG^Negative mg/dL    Bilirubin Urine Negative NEG^Negative    Ketones Urine Negative NEG^Negative mg/dL    Specific Gravity Urine 1.010 1.003 - 1.035    pH Urine 6.5 5.0 - 7.0 pH    Protein Albumin Urine Negative NEG^Negative mg/dL    Urobilinogen Urine 0.2 0.2 - 1.0 EU/dL    Nitrite Urine Negative NEG^Negative    Blood Urine Small (A) NEG^Negative    Leukocyte Esterase Urine Negative NEG^Negative    Source Midstream Urine     WBC Urine 0 - 5 OTO5^0 - 5 /HPF    RBC Urine O - 2 OTO2^O - 2 /HPF    Squamous Epithelial /LPF Urine Few FEW^Few /LPF    Bacteria Urine Few (A) NEG^Negative /HPF   Wet prep   Result Value Ref Range    Specimen Description Vagina     Wet Prep       No Trichomonas seen  No clue cells seen  No yeast seen      Wet Prep Rare  WBC'S seen        *Note: Due to a large number of results and/or encounters for the requested time period, some results have not been displayed. A complete set of results can be found in Results Review.       Patient appears to have not provided utox today.     ASSESSMENT/PLAN:        1. Uncomplicated opioid dependence (H)    2. Chest wall pain    3. High grade B-cell lymphoma (H)    4. Personal history of malignant neoplasm of breast    5. Status post bariatric surgery         Discussed at length with patient that unfortunately my ability to provide further care for her is fairly limited.  I would be able to continue buprenorphine for her but she is not interested in that due to side effects.  I am unable to provide ongoing opioid therapy as we do not manage ongoing chronic pain other than with buprenorphine.    Reviewed continuum of dependence and risk of addiction.   Long term risk of opioid use, opioid induced hyperanalgesia and reasoning for taper recommendations discussed at length.       Discussed that with  "patient's complicated medical history she would benefit from ongoing primary care management to help \"quarterback\" her medical care and drive further recommendations.  Reviewed that hopefully her situation could be looked at based on where she is currently in her current level of lifestyle quality and distress and a plan formulated to move forward.  Possibility of internal medicine involvement.  Possibility of return to previous pain  management providers with the goal of taking a new look at her situation now that she has been through her recent experiences and current medications.    Would not recommend continuing Vistaril or clonidine at this point if she feels they are causing uncomfortable side effects and are less than helpful.  She is now at the point where her opioid withdrawal symptoms should be slowly starting to resolve in any case.  Opioid induced hyperanalgesia will take months to resolve.      Strongly encouraged her to make use of other pain relieving modalities have been suggested in the past, mental health support for significant life changes and ongoing medical issues.    Would be happy to see her back for further consideration of retrial of buprenorphine.    With her permission will reach out to Dr. Luna to review her visit today and the above recommendations.    Noted in chart that she had reached out to Dr. Luna earlier this week for urethritis symptoms and had a urology appointment scheduled but she canceled.  She did not bring up any of the symptoms today.    Risk of overdose reviewed.  Patient has naloxone rx.       Total time spent was  >40  minutes, and more than 50% of face to face time was spent in counseling and/or coordination of care regarding principles of multidisciplinary care, medication management, and treatment options as discussed above.       ENCOUNTER FOR LONG TERM USE OF HIGH RISK MEDICATION   High Risk Drug Monitoring?  YES   Drug being monitored: " Buprenorphine   Reason for drug: Opioid Use Disorder   What is being monitored?: Dosage, Cravings, Trigger, side effects, and continued abstinence.      Opioid warning reviewed.  Risk of overdose following a period of abstinence due to decrease tolerance was discussed including risk of death.   Risk of overdose if using Suboxone with other substances particuarly benzodiazepines/alcohol was reviewed.        Lexus Shell MD  [facility]  [phone]

## 2019-04-29 ENCOUNTER — MYC MEDICAL ADVICE (OUTPATIENT)
Dept: FAMILY MEDICINE | Facility: CLINIC | Age: 59
End: 2019-04-29

## 2019-04-30 ENCOUNTER — OFFICE VISIT (OUTPATIENT)
Dept: FAMILY MEDICINE | Facility: CLINIC | Age: 59
End: 2019-04-30
Payer: COMMERCIAL

## 2019-04-30 VITALS
WEIGHT: 164.2 LBS | SYSTOLIC BLOOD PRESSURE: 146 MMHG | BODY MASS INDEX: 27.32 KG/M2 | RESPIRATION RATE: 24 BRPM | OXYGEN SATURATION: 99 % | TEMPERATURE: 98.7 F | HEART RATE: 127 BPM | DIASTOLIC BLOOD PRESSURE: 94 MMHG

## 2019-04-30 DIAGNOSIS — F11.23 OPIOID DEPENDENCE WITH WITHDRAWAL (H): Primary | ICD-10-CM

## 2019-04-30 PROCEDURE — 99214 OFFICE O/P EST MOD 30 MIN: CPT | Performed by: FAMILY MEDICINE

## 2019-04-30 NOTE — PROGRESS NOTES
SUBJECTIVE:   Tisha Arias is a 58 year old female who presents to clinic today for the following   health issues:    Chief Complaint   Patient presents with     Forms     TIM     Patient presents to have short term disability forms filled out for missed work time.  She was denied initially because she had not worked sufficient hours to qualify.  She's currently going through an opiate weaning process that involved suboxone, which she did not tolerate, subutex, which she was unable to tolerate as well.  Patient is not currently taking medications for opiate withdrawal.    Additional history: as documented    Reviewed  and updated as needed this visit by clinical staff  Tobacco  Allergies  Meds  Problems  Med Hx  Surg Hx  Fam Hx         Reviewed and updated as needed this visit by Provider  Tobacco  Allergies  Meds  Problems  Med Hx  Surg Hx  Fam Hx         BP Readings from Last 3 Encounters:   05/03/19 116/64   04/30/19 (!) 146/94   04/23/19 (!) 149/100    Wt Readings from Last 3 Encounters:   05/03/19 73.5 kg (162 lb)   04/30/19 74.5 kg (164 lb 3.2 oz)   04/19/19 80 kg (176 lb 6.4 oz)                    ROS:  Constitutional, HEENT, cardiovascular, pulmonary, gi and gu systems are negative, except as otherwise noted.    OBJECTIVE:     BP (!) 146/94   Pulse 127   Temp 98.7  F (37.1  C) (Oral)   Resp 24   Wt 74.5 kg (164 lb 3.2 oz)   SpO2 99%   BMI 27.32 kg/m    Body mass index is 27.32 kg/m .  GENERAL: healthy, alert and no distress  EYES: Eyes grossly normal to inspection, PERRL and conjunctivae and sclerae normal  NECK: no adenopathy, no asymmetry, masses, or scars and thyroid normal to palpation  RESP: lungs clear to auscultation - no rales, rhonchi or wheezes  CV: regular rate and rhythm, normal S1 S2, no S3 or S4, no murmur, click or rub, no peripheral edema and peripheral pulses strong  MS: no gross musculoskeletal defects noted, no edema  SKIN: no suspicious lesions or  rashes  NEURO: Normal strength and tone, mentation intact and speech normal  PSYCH: mentation appears normal, affect normal/bright    ASSESSMENT/PLAN:       ICD-10-CM    1. Opioid dependence with withdrawal (H) F11.23        Recommend she follow-up with per pain medicine specialists to discuss weaning.  It seems she may have exhausted her options in terms of getting medication support during the withdrawal process.  Forms completed and sent off today.    Follow up if symptoms worsen or fail to improve.     A total of 35 minutes spent face-to-face with greater than 50% of the time spent in counseling and coordinating cares of the issues above   Pablo Zarco MD  [facility]

## 2019-04-30 NOTE — TELEPHONE ENCOUNTER
Huddled with provider- okay to add between 10:30-12 or between 2-3.    Patient is busy at 10 this morning she is able to come in around 2:15pm- appointment scheduled.  Advised patient that this visit is for forms only, if any other issues need to be addressed she will need another visit.  Patient verbalized understanding, no further questions or concerns.    Sera Mtz RN

## 2019-05-01 DIAGNOSIS — M79.18 MYOFASCIAL PAIN: ICD-10-CM

## 2019-05-03 ENCOUNTER — TRANSFERRED RECORDS (OUTPATIENT)
Dept: HEALTH INFORMATION MANAGEMENT | Facility: CLINIC | Age: 59
End: 2019-05-03

## 2019-05-03 ENCOUNTER — MYC MEDICAL ADVICE (OUTPATIENT)
Dept: FAMILY MEDICINE | Facility: CLINIC | Age: 59
End: 2019-05-03

## 2019-05-03 ENCOUNTER — OFFICE VISIT (OUTPATIENT)
Dept: FAMILY MEDICINE | Facility: CLINIC | Age: 59
End: 2019-05-03
Payer: COMMERCIAL

## 2019-05-03 VITALS
TEMPERATURE: 98.4 F | BODY MASS INDEX: 26.96 KG/M2 | SYSTOLIC BLOOD PRESSURE: 116 MMHG | WEIGHT: 162 LBS | DIASTOLIC BLOOD PRESSURE: 64 MMHG | OXYGEN SATURATION: 100 % | HEART RATE: 111 BPM

## 2019-05-03 DIAGNOSIS — G89.4 CHRONIC PAIN SYNDROME: Primary | ICD-10-CM

## 2019-05-03 PROCEDURE — 99213 OFFICE O/P EST LOW 20 MIN: CPT | Performed by: FAMILY MEDICINE

## 2019-05-03 RX ORDER — OLANZAPINE 5 MG/1
5 TABLET ORAL AT BEDTIME
Qty: 7 TABLET | Refills: 0 | Status: SHIPPED | OUTPATIENT
Start: 2019-05-03 | End: 2019-12-02

## 2019-05-03 ASSESSMENT — PAIN SCALES - GENERAL: PAINLEVEL: SEVERE PAIN (6)

## 2019-05-03 NOTE — TELEPHONE ENCOUNTER
Called patient and she stated she just left the ER and they would not give prescription for Zyprexa and asked her to contact PCP.  Advised patient that a message could be sent to covering provider (PCP out of office) however no guarantee they will prescribe.  Offered patient appointment today with provider- appointment scheduled.   Sera Mtz RN

## 2019-05-03 NOTE — PROGRESS NOTES
SUBJECTIVE:   Tisha Arias is a 58 year old female who presents to clinic today for the following   health issues:    ED/UC Followup:    Facility:  [facility]  Date of visit: 5/3/19  Reason for visit: Chest wall pain (Primary Dx)  Current Status: Feeling good on Zyprexa     Tisha Arias is a 58 y.o. female with a history of chronic pain and opioid dependency who was seen in the ER and treated with IV Zyprexa and got good relieve.  She has follow up with pain clinic in 1 week. She is requesting a short term prescription as awaits evaluation by pain clinic. She was on opioid maintenance therapy which she decided to stop on her own.    Additional history: as documented    Reviewed and updated as needed this visit by Provider    Patient Active Problem List   Diagnosis     Seasonal allergic rhinitis     Allergic rhinitis due to animal dander     Food intolerance     CARDIOVASCULAR SCREENING; LDL GOAL LESS THAN 160     DCIS (ductal carcinoma in situ) of right breast     Breast cancer (H)     Infection     Cellulitis     Family history of uterine cancer     Lymphedema of breast     Atrophic vaginitis     Neuropathic pain of chest     Papillary carcinoma, follicular variant (H)     Medication side effects     S/P thyroidectomy     Postsurgical subclinical hypoparathyroidism (H)     Postsurgical hypothyroidism     S/P breast reconstruction     Metastasis to cervical lymph node (H)     Vitamin D deficiency     Chronic pain, post bilateral mastectomies and breast reconstruction      Idiopathic mast cell activation syndrome (H)     Mild intermittent asthma without complication     Chest wall pain     Intertrigo     Rash     Migraine without status migrainosus, not intractable, unspecified migraine type     Status post bariatric surgery     Hypocalcemia     Personal history of malignant neoplasm of breast     High grade B-cell lymphoma (H)     DLBCL (diffuse large B cell lymphoma) (H)     Diffuse large B cell  lymphoma (H)     Bilateral lower extremity edema     Kidney stone     Chronic pain disorder     Long term current use of opiate analgesic     Compound heterozygous MTHFR mutation [id number] (H)     History of migraine headaches     Intestinal malabsorption     Serotonin neurotoxicity     History of gastric bypass     Opioid-induced hyperalgesia     Past Surgical History:   Procedure Laterality Date     BACK SURGERY  ,    Bulging disc w nerve root impigment     BIOPSY BREAST      right     BIOPSY BREAST  11    right, core and sterotactic     BONE MARROW BIOPSY, BONE SPECIMEN, NEEDLE/TROCAR Left 10/3/2017    Procedure: BIOPSY BONE MARROW;  Bone Marrow Biopsy with aspirate;  Surgeon: Amelia Watkins PA-C;  [facility]     BYPASS GASTRIC, CHOLECYSTECTOMY, COMBINED       C LAPAROSCOPIC GASTRIC RESTRICTIVE PX, W/GASTRIC BYPASS/ GAMALIEL-EN-Y, < 150CM      Gmaaliel en Y?      SECTION       COLONOSCOPY      normal     COSMETIC SURGERY  2012    Final Stage of Mastectomy     DAVINCI HYSTERECTOMY TOTAL, BILATERAL SALPINGO-OOPHORECTOMY, COMBINED  2012    Procedure: COMBINED DAVINCI HYSTERECTOMY TOTAL, SALPINGO-OOPHORECTOMY;  Davinci Total Laparoscopic Hysterectomy, Bilateral Salpingo Oophorectomy, Pelvic Washings, Cystoscopy;  Surgeon: Evie Sheikh MD;  Location: [facility] OR     ENT SURGERY       ESOPHAGOSCOPY, GASTROSCOPY, DUODENOSCOPY (EGD), COMBINED N/A 2017    Procedure: COMBINED ENDOSCOPIC ULTRASOUND, ESOPHAGOSCOPY, GASTROSCOPY, DUODENOSCOPY (EGD), FINE NEEDLE ASPIRATE/BIOPSY;  Esophagogastroduodenoscopy, Endoscopic Ultrasound, with fine needle biopsy aspirate;  Surgeon: Patrick Robles MD;  Location: [facility] OR     GI SURGERY  2007    Gamaliel-en Y w cholecystectomy     GYN SURGERY  89,1/10/92    2 c-sections     HYSTERECTOMY, PAP NO LONGER INDICATED      DeVinci assister lap hyst with BSO     INSERT PORT VASCULAR ACCESS Right 10/4/2017    Procedure:  INSERT PORT VASCULAR ACCESS;  Port Placement;  Surgeon: Jc Goodman PA-C;  [facility]     IRRIGATION AND DEBRIDEMENT BREAST  3/1/2012    Procedure:IRRIGATION AND DEBRIDEMENT BREAST; Irrigation and Debridement, Wound Closure Right Breast; Surgeon:JUNG GUILLEN; Location:[facility] OR     MASTECTOMY, RECONSTRUCT BREAST, COMBINED  1/30/2012    Procedure:COMBINED MASTECTOMY, RECONSTRUCT BREAST; Bilateral Mastectomies, Right Axillary [place] Node Biopsy, Bilateral Breast Reconstruction with Tissue Expanders, Reconstruction of inframammary fold, bilateral pain management systems; Surgeon:ANUPAM CAMPBELL; Location[facility] OR     RECONSTRUCT BREAST  8/31/2012    Procedure: RECONSTRUCT BREAST;  Bilateral 2nd stage breast reconstruction, revision,       SOFT TISSUE SURGERY  1-30-12    Mastectomy w severe myofascial pain syndrome     THYROIDECTOMY  7/10/2013    Procedure: THYROIDECTOMY;  Total Thyroidectomy with central neck dissection;  Surgeon: Indiana Sneed MD;  Location: [facility] OR     TONSILLECTOMY      childhood       Social History     Tobacco Use     Smoking status: Never Smoker     Smokeless tobacco: Never Used     Tobacco comment: no 2nd hand smoke exposure   Substance Use Topics     Alcohol use: No     Comment: rare     Family History   Problem Relation Age of Onset     Allergies Mother      Arthritis Mother      Cancer Mother         uterine/uterine     Hypertension Mother         on HCTZ     Hyperlipidemia Mother      Cerebrovascular Disease Mother         s/p brain surgery     Breast Cancer Mother      Depression Mother      Anxiety Disorder Mother      Anesthesia Reaction Mother      Asthma Mother      Skin Cancer Mother      Heart Disease Father      Diabetes Father      Coronary Artery Disease Father      Hypertension Father      Hyperlipidemia Father      Cerebrovascular Disease Father         Multiple strokes     Obesity Father      Diabetes Brother      Depression Brother      Hypertension  Brother      Cancer Maternal Grandfather         pancreatic cancer/stomach cancer     Prostate Cancer Maternal Grandfather         Prostrate and Bladder     Other Cancer Maternal Grandfather         Pancreatic 1988, Bladder 1977     Cancer Maternal Grandmother         uterine     Breast Cancer Maternal Grandmother      Skin Cancer Maternal Grandmother      Cancer Brother 57        pancreatic cancer     Diabetes Brother      Breast Cancer Cousin         Grand mothers sister     Other Cancer Brother         Stage 3 Pancreatic 2-2015     Depression Brother      Asthma Brother      Obesity Brother      Anesthesia Reaction Other         H/a, itching, drug interactions     Asthma Other      Cancer Brother         Pancreatic      Thyroid Disease No family hx of          ROS:  Constitutional, HEENT, cardiovascular, pulmonary, gi and gu systems are negative, except as otherwise noted.    OBJECTIVE:     /64   Pulse 111   Temp 98.4  F (36.9  C) (Oral)   Wt 73.5 kg (162 lb)   SpO2 100%   BMI 26.96 kg/m    Body mass index is 26.96 kg/m .  GENERAL: healthy, alert and no distress  RESP: lungs clear to auscultation - no rales, rhonchi or wheezes  CV: regular rate and rhythm, normal S1 S2, no S3 or S4, no murmur, click or rub, no peripheral edema and peripheral pulses strong  MS: no gross musculoskeletal defects noted, no edema  NEURO: Normal strength and tone, mentation intact and speech normal  PSYCH: mentation appears normal, affect normal/bright    Diagnostic Test Results:  none     ASSESSMENT/PLAN:     (G89.4) Chronic pain syndrome  (primary encounter diagnosis)  Comment: Patient reports that Zyprexa helped, will refill a short supply to get her to scheduled follow up next week.  Plan: OLANZapine (ZYPREXA) 5 MG tablet  '  Puma Lo MD  [facility]

## 2019-05-07 ENCOUNTER — TELEPHONE (OUTPATIENT)
Dept: FAMILY MEDICINE | Facility: CLINIC | Age: 59
End: 2019-05-07

## 2019-05-07 ENCOUNTER — OFFICE VISIT (OUTPATIENT)
Dept: FAMILY MEDICINE | Facility: CLINIC | Age: 59
End: 2019-05-07
Payer: COMMERCIAL

## 2019-05-07 VITALS
TEMPERATURE: 98.7 F | SYSTOLIC BLOOD PRESSURE: 118 MMHG | HEART RATE: 125 BPM | OXYGEN SATURATION: 100 % | RESPIRATION RATE: 22 BRPM | DIASTOLIC BLOOD PRESSURE: 88 MMHG | WEIGHT: 164.8 LBS | BODY MASS INDEX: 27.42 KG/M2

## 2019-05-07 DIAGNOSIS — F11.93 OPIATE WITHDRAWAL (H): Primary | ICD-10-CM

## 2019-05-07 PROCEDURE — 99214 OFFICE O/P EST MOD 30 MIN: CPT | Performed by: FAMILY MEDICINE

## 2019-05-07 RX ORDER — TIZANIDINE HYDROCHLORIDE 4 MG/1
4 CAPSULE, GELATIN COATED ORAL 3 TIMES DAILY
Qty: 90 CAPSULE | Refills: 0 | Status: SHIPPED | OUTPATIENT
Start: 2019-05-07 | End: 2019-10-11

## 2019-05-07 NOTE — TELEPHONE ENCOUNTER
Reason for call:  Other   Patient called regarding (reason for call): call back  Additional comments: Claudia from [facility] pharmacy is calling because she wants to know if she can give the medication tiZANidine (ZANAFLEX) 4 MG capsule in tablets instead. Please call back    Phone number to reach patient:  Other phone number:  [phone]    Best Time:  any    Can we leave a detailed message on this number?  YES

## 2019-05-07 NOTE — PROGRESS NOTES
SUBJECTIVE:   Tisha Arias is a 58 year old female who presents to clinic today for the following   health issues:        Chief Complaint   Patient presents with     Forms     Disability      Patient Request     Discuss about prolonged withdrawl symptoms     Recheck Medication     Zyprexa     Patient presents with prolonged withdrawal symptoms as well as short term disability paperwork completion.  She has exhausted all options for weaning off opiates as she had reactions to both subutex and suboxone.  Currently is in pain, has jitteriness, trouble sleeping, etc.  Patient does not have follow-up plan with pain specialist.    Additional history: as documented    Reviewed  and updated as needed this visit by clinical staff  Tobacco  Allergies  Meds  Fam Hx         Reviewed and updated as needed this visit by Provider  [facility] Hx         BP Readings from Last 3 Encounters:   05/07/19 118/88   05/03/19 116/64   04/30/19 (!) 146/94    Wt Readings from Last 3 Encounters:   05/07/19 74.8 kg (164 lb 12.8 oz)   05/03/19 73.5 kg (162 lb)   04/30/19 74.5 kg (164 lb 3.2 oz)                    ROS:  Constitutional, HEENT, cardiovascular, pulmonary, gi and gu systems are negative, except as otherwise noted.    OBJECTIVE:     /88   Pulse 125   Temp 98.7  F (37.1  C) (Oral)   Resp 22   Wt 74.8 kg (164 lb 12.8 oz)   SpO2 100%   BMI 27.42 kg/m    Body mass index is 27.42 kg/m .  GENERAL: healthy, alert and no distress  EYES: Eyes grossly normal to inspection, PERRL and conjunctivae and sclerae normal  NECK: no adenopathy, no asymmetry, masses, or scars and thyroid normal to palpation  RESP: lungs clear to auscultation - no rales, rhonchi or wheezes  CV: regular rate and rhythm, normal S1 S2, no S3 or S4, no murmur, click or rub, no peripheral edema and peripheral pulses strong  SKIN: no suspicious lesions or rashes  NEURO: Normal strength and tone, mentation intact and speech normal  PSYCH: mentation appears  normal, affect normal/bright    ASSESSMENT/PLAN:       ICD-10-CM    1. Opiate withdrawal (H) F11.23 tiZANidine (ZANAFLEX) 4 MG capsule     PAIN MANAGEMENT REFERRAL     Will give trial of zanaflex which has been effective for her in the past.  Will have her touch base with an additional pain specialist as requested.  Paperwork completed.    A total of 30 minutes spent face-to-face with greater than 50% of the time spent in counseling and coordinating cares of the issues above     Follow up if symptoms worsen or fail to improve.     Pablo Zarco MD  [facility]

## 2019-05-08 ASSESSMENT — ASTHMA QUESTIONNAIRES: ACT_TOTALSCORE: 25

## 2019-05-13 ENCOUNTER — MYC MEDICAL ADVICE (OUTPATIENT)
Dept: ONCOLOGY | Facility: CLINIC | Age: 59
End: 2019-05-13

## 2019-05-14 ENCOUNTER — MYC REFILL (OUTPATIENT)
Dept: FAMILY MEDICINE | Facility: CLINIC | Age: 59
End: 2019-05-14

## 2019-05-14 ENCOUNTER — MYC REFILL (OUTPATIENT)
Dept: PALLIATIVE CARE | Facility: CLINIC | Age: 59
End: 2019-05-14

## 2019-05-14 DIAGNOSIS — R07.89 CHEST WALL PAIN: ICD-10-CM

## 2019-05-14 DIAGNOSIS — M79.18 MYOFASCIAL PAIN: ICD-10-CM

## 2019-05-14 DIAGNOSIS — R11.2 NAUSEA AND VOMITING, INTRACTABILITY OF VOMITING NOT SPECIFIED, UNSPECIFIED VOMITING TYPE: ICD-10-CM

## 2019-05-14 DIAGNOSIS — J45.20 MILD INTERMITTENT ASTHMA WITHOUT COMPLICATION: ICD-10-CM

## 2019-05-14 DIAGNOSIS — C85.10 HIGH GRADE B-CELL LYMPHOMA (H): ICD-10-CM

## 2019-05-14 DIAGNOSIS — G43.519 INTRACTABLE PERSISTENT MIGRAINE AURA WITHOUT CEREBRAL INFARCTION AND WITHOUT STATUS MIGRAINOSUS: ICD-10-CM

## 2019-05-15 RX ORDER — SUMATRIPTAN 50 MG/1
50 TABLET, FILM COATED ORAL PRN
Qty: 30 TABLET | Refills: 0 | Status: SHIPPED | OUTPATIENT
Start: 2019-05-15 | End: 2020-04-06

## 2019-05-15 RX ORDER — DIAZEPAM 5 MG
TABLET ORAL
Qty: 90 TABLET | Refills: 0 | Status: SHIPPED | OUTPATIENT
Start: 2019-05-16 | End: 2020-04-06

## 2019-05-15 RX ORDER — ONDANSETRON 8 MG/1
8 TABLET, ORALLY DISINTEGRATING ORAL EVERY 8 HOURS PRN
Qty: 30 TABLET | Refills: 0 | Status: SHIPPED | OUTPATIENT
Start: 2019-05-15 | End: 2020-10-07

## 2019-05-15 NOTE — TELEPHONE ENCOUNTER
Received fredi lares from patient requesting refills of ondansetron, imitrex, valium, voltaren gel.     Last refills ondansetron, imitrex, valium: 3/21/2019  Last refill voltaren gel: 5/21/2019  Last office visit: 11/28/2019  No follow up scheduled at this time.     Will route request to MD for review.     Reviewed [place]  Report.

## 2019-05-15 NOTE — TELEPHONE ENCOUNTER
"Routing refill request to provider for review/approval because:  New PCP, previously prescribing provider no longer with clinic      Requested Prescriptions   Pending Prescriptions Disp Refills     albuterol (VENTOLIN HFA) 108 (90 Base) MCG/ACT inhaler 18 g 3       Asthma Maintenance Inhalers - Anticholinergics Passed - 5/15/2019  6:59 AM        Passed - Patient is age 12 years or older        Passed - Asthma control assessment score within normal limits in last 6 months     Please review ACT score.           Passed - Medication is active on med list        Passed - Recent (6 mo) or future (30 days) visit within the authorizing provider's specialty     Patient had office visit in the last 6 months or has a visit in the next 30 days with authorizing provider or within the authorizing provider's specialty.  See \"Patient Info\" tab in inbasket, or \"Choose Columns\" in Meds & Orders section of the refill encounter.              Mary Wilkerson, CASSIDY - BC      "

## 2019-05-18 DIAGNOSIS — R07.89 CHEST WALL PAIN: ICD-10-CM

## 2019-05-20 RX ORDER — ALBUTEROL SULFATE 90 UG/1
1-2 AEROSOL, METERED RESPIRATORY (INHALATION) EVERY 4 HOURS PRN
Qty: 18 G | Refills: 3 | Status: SHIPPED | OUTPATIENT
Start: 2019-05-20 | End: 2019-12-09

## 2019-05-21 RX ORDER — DIAZEPAM 5 MG
TABLET ORAL
Qty: 90 TABLET | Refills: 0 | OUTPATIENT
Start: 2019-05-21

## 2019-05-21 NOTE — TELEPHONE ENCOUNTER
This prescription was already approved for a 1 month supply on 5/16 and sent to pharmacy.     Radha Gonzalez RN, OCN

## 2019-05-24 ENCOUNTER — OFFICE VISIT (OUTPATIENT)
Dept: FAMILY MEDICINE | Facility: CLINIC | Age: 59
End: 2019-05-24
Payer: COMMERCIAL

## 2019-05-24 VITALS
TEMPERATURE: 98 F | SYSTOLIC BLOOD PRESSURE: 138 MMHG | HEART RATE: 131 BPM | DIASTOLIC BLOOD PRESSURE: 84 MMHG | OXYGEN SATURATION: 99 % | RESPIRATION RATE: 22 BRPM

## 2019-05-24 DIAGNOSIS — R07.89 CHEST WALL PAIN: ICD-10-CM

## 2019-05-24 DIAGNOSIS — M79.2 NEUROPATHIC PAIN OF CHEST: ICD-10-CM

## 2019-05-24 DIAGNOSIS — T40.2X5A OPIOID-INDUCED HYPERALGESIA: ICD-10-CM

## 2019-05-24 DIAGNOSIS — Z86.59: ICD-10-CM

## 2019-05-24 DIAGNOSIS — G89.4 CHRONIC PAIN SYNDROME: Primary | ICD-10-CM

## 2019-05-24 DIAGNOSIS — R20.8 OPIOID-INDUCED HYPERALGESIA: ICD-10-CM

## 2019-05-24 PROCEDURE — 99214 OFFICE O/P EST MOD 30 MIN: CPT | Performed by: FAMILY MEDICINE

## 2019-05-24 RX ORDER — DIAZEPAM 5 MG
TABLET ORAL
Qty: 90 TABLET | Refills: 0 | Status: CANCELLED | OUTPATIENT
Start: 2019-05-24

## 2019-05-24 NOTE — PROGRESS NOTES
Subjective     Tisha Arias is a 58 year old female who presents to clinic today for the following health issues:    HPI   Chief Complaint   Patient presents with     medical     extending medical leave      Patient presents to have paperwork filled out, she's requesting to have her FMLA leave extended.  She was quickly weaned off her subutex and has reported reactions/side effects to suboxone.  She had been experiencing withdrawal symptoms that lasted much longer than expected.  She states she's unable to go back to work because of this.    BP Readings from Last 3 Encounters:   05/24/19 138/84   05/07/19 118/88   05/03/19 116/64    Wt Readings from Last 3 Encounters:   05/07/19 74.8 kg (164 lb 12.8 oz)   05/03/19 73.5 kg (162 lb)   04/30/19 74.5 kg (164 lb 3.2 oz)                      Reviewed and updated as needed this visit by Provider  Tobacco  Allergies  Meds  Problems  Med Hx  Surg Hx  Fam Hx         Review of Systems   ROS COMP: Constitutional, HEENT, cardiovascular, pulmonary, gi and gu systems are negative, except as otherwise noted.      Objective    /84   Pulse 131   Temp 98  F (36.7  C) (Oral)   Resp 22   SpO2 99%    Physical Exam   GENERAL: healthy, alert and no distress  EYES: Eyes grossly normal to inspection, PERRL and conjunctivae and sclerae normal  NECK: no adenopathy, no asymmetry, masses, or scars and thyroid normal to palpation  RESP: lungs clear to auscultation - no rales, rhonchi or wheezes  CV: regular rate and rhythm, normal S1 S2, no S3 or S4, no murmur, click or rub, no peripheral edema and peripheral pulses strong  SKIN: no suspicious lesions or rashes  PSYCH: mentation appears normal, affect normal/bright          Assessment & Plan       ICD-10-CM    1. Chronic pain syndrome G89.4 INTERNAL MEDICINE REFERRAL     PAIN MANAGEMENT REFERRAL     CANCELED: PAIN MANAGEMENT REFERRAL   2. Chest wall pain R07.89 PAIN MANAGEMENT REFERRAL     CANCELED: PAIN MANAGEMENT  REFERRAL   3. Opioid-induced hyperalgesia R20.8 PAIN MANAGEMENT REFERRAL    T40.2X5A CANCELED: PAIN MANAGEMENT REFERRAL   4. Neuropathic pain of chest M79.2 INTERNAL MEDICINE REFERRAL     PAIN MANAGEMENT REFERRAL     CANCELED: PAIN MANAGEMENT REFERRAL   5. H/O drug withdrawal syndrome Z86.59 INTERNAL MEDICINE REFERRAL   Will refer to another pain specialist for management of subacute withdrawal and chronic pain  Will refer to internal med for medical management as well.     A total of 30 minutes spent face-to-face with greater than 50% of the time spent in counseling and coordinating cares of the issues above     Return in about 1 month (around 6/24/2019) for Next Scheduled visit with PCP.    Pablo Zarco MD  [facility]    \

## 2019-05-26 DIAGNOSIS — C77.0 METASTASIS TO CERVICAL LYMPH NODE (H): ICD-10-CM

## 2019-05-26 DIAGNOSIS — C73 PAPILLARY CARCINOMA, FOLLICULAR VARIANT (H): ICD-10-CM

## 2019-05-26 DIAGNOSIS — E89.0 POSTSURGICAL HYPOTHYROIDISM: ICD-10-CM

## 2019-05-29 ENCOUNTER — TELEPHONE (OUTPATIENT)
Dept: PALLIATIVE MEDICINE | Facility: CLINIC | Age: 59
End: 2019-05-29

## 2019-05-30 RX ORDER — CALCITRIOL 0.25 UG/1
CAPSULE, LIQUID FILLED ORAL
Qty: 270 CAPSULE | Refills: 3 | Status: SHIPPED | OUTPATIENT
Start: 2019-05-30 | End: 2020-08-03

## 2019-05-30 NOTE — TELEPHONE ENCOUNTER
calcitRIOL (ROCALTROL) 0.25 MCG capsule      Last Written Prescription Date:  5-21-18  Last Fill Quantity: 90,   # refills: 11  Last Office Visit : 11-26-18  Future Office visit:  none    Kathleen M Doege RN

## 2019-06-05 ENCOUNTER — PATIENT OUTREACH (OUTPATIENT)
Dept: ONCOLOGY | Facility: CLINIC | Age: 59
End: 2019-06-05

## 2019-06-05 NOTE — PROGRESS NOTES
LVM requesting a call back to discuss her refill request for tamoxifen. She did not come to her appointment with Dr Crawley and we are unsure if she has transferred her care to another clinic. She will need to be seen by Dr Crawley in order to obtain the refill.  Left detailed contact information.

## 2019-06-06 ENCOUNTER — PATIENT OUTREACH (OUTPATIENT)
Dept: ONCOLOGY | Facility: CLINIC | Age: 59
End: 2019-06-06

## 2019-06-06 NOTE — PROGRESS NOTES
Received a return call from Elvin. She plans to continue following with Dr Crawley and is rescheduled to see her on 7/1. She has 2 tabs of Tamoxifen left. Message sent to Dr. Crawley

## 2019-06-10 DIAGNOSIS — M62.838 MUSCLE SPASM: ICD-10-CM

## 2019-06-10 RX ORDER — CYCLOBENZAPRINE HCL 5 MG
5-10 TABLET ORAL 2 TIMES DAILY PRN
Qty: 90 TABLET | Refills: 0 | Status: SHIPPED | OUTPATIENT
Start: 2019-06-10 | End: 2019-08-05

## 2019-06-18 DIAGNOSIS — R07.89 CHEST WALL PAIN: ICD-10-CM

## 2019-06-18 RX ORDER — DIAZEPAM 5 MG
TABLET ORAL
Qty: 90 TABLET | Refills: 0 | OUTPATIENT
Start: 2019-06-18

## 2019-06-18 NOTE — TELEPHONE ENCOUNTER
Patient is no longer following with Dr. Benitez. At last refill on 5/14 patient was informed that was her last refill unless she comes back to re-establish care with MD. See [employer] message (confirmation patient has read message).     Radha Gonzalez, RN, OCN

## 2019-06-20 DIAGNOSIS — R07.89 CHEST WALL PAIN: ICD-10-CM

## 2019-06-26 RX ORDER — CELECOXIB 200 MG/1
CAPSULE ORAL
Qty: 60 CAPSULE | Refills: 1 | OUTPATIENT
Start: 2019-06-26

## 2019-07-01 ENCOUNTER — PATIENT OUTREACH (OUTPATIENT)
Dept: ONCOLOGY | Facility: CLINIC | Age: 59
End: 2019-07-01

## 2019-07-01 ENCOUNTER — ONCOLOGY VISIT (OUTPATIENT)
Dept: ONCOLOGY | Facility: CLINIC | Age: 59
End: 2019-07-01
Attending: INTERNAL MEDICINE
Payer: COMMERCIAL

## 2019-07-01 ENCOUNTER — RECORDS - HEALTHEAST (OUTPATIENT)
Dept: ADMINISTRATIVE | Facility: OTHER | Age: 59
End: 2019-07-01

## 2019-07-01 ENCOUNTER — ANCILLARY PROCEDURE (OUTPATIENT)
Dept: CT IMAGING | Facility: CLINIC | Age: 59
End: 2019-07-01
Payer: COMMERCIAL

## 2019-07-01 VITALS
RESPIRATION RATE: 14 BRPM | WEIGHT: 180 LBS | HEART RATE: 89 BPM | TEMPERATURE: 97.8 F | HEIGHT: 65 IN | BODY MASS INDEX: 29.99 KG/M2 | OXYGEN SATURATION: 100 % | DIASTOLIC BLOOD PRESSURE: 90 MMHG | SYSTOLIC BLOOD PRESSURE: 140 MMHG

## 2019-07-01 DIAGNOSIS — Z85.3 PERSONAL HISTORY OF MALIGNANT NEOPLASM OF BREAST: ICD-10-CM

## 2019-07-01 DIAGNOSIS — C83.30 DIFFUSE LARGE B-CELL LYMPHOMA, UNSPECIFIED BODY REGION (H): Primary | ICD-10-CM

## 2019-07-01 DIAGNOSIS — C83.38 DIFFUSE LARGE B-CELL LYMPHOMA OF LYMPH NODES OF MULTIPLE REGIONS (H): ICD-10-CM

## 2019-07-01 LAB
CREAT BLD-MCNC: 0.9 MG/DL (ref 0.52–1.04)
GFR SERPL CREATININE-BSD FRML MDRD: 64 ML/MIN/{1.73_M2}
RADIOLOGIST FLAGS: ABNORMAL

## 2019-07-01 PROCEDURE — G0463 HOSPITAL OUTPT CLINIC VISIT: HCPCS | Mod: ZF

## 2019-07-01 PROCEDURE — 99214 OFFICE O/P EST MOD 30 MIN: CPT | Mod: ZP | Performed by: INTERNAL MEDICINE

## 2019-07-01 RX ORDER — IOPAMIDOL 755 MG/ML
100 INJECTION, SOLUTION INTRAVASCULAR ONCE
Status: COMPLETED | OUTPATIENT
Start: 2019-07-01 | End: 2019-07-01

## 2019-07-01 RX ADMIN — IOPAMIDOL 100 ML: 755 INJECTION, SOLUTION INTRAVASCULAR at 16:07

## 2019-07-01 ASSESSMENT — MIFFLIN-ST. JEOR: SCORE: 1397.35

## 2019-07-01 ASSESSMENT — PAIN SCALES - GENERAL: PAINLEVEL: WORST PAIN (10)

## 2019-07-01 NOTE — PROGRESS NOTES
July 1, 2019    REASON FOR VISIT:  F/u breast cancer and thyroid cancer and lymphoma     HISTORY OF PRESENT ILLNESS:  Tisha Arias is a 58 year-old woman, postmenopausal, with a history of T1c N0 M0, infiltrating ductal carcinoma of the right breast with a strong family history of breast cancer as well as thyroid cancer and a history of DLBCL. BRCA1 and 2 gene testing negative.    Cancer Treatment History for her breast cancer and DLBCL:  -She was diagnosed with breast cancer in 12/2011.    -She underwent bilateral mastectomy with immediate reconstruction on 01/30/2012 by Dr. Daugherty.  Her final pathology revealed 1.6-cm, infiltrating ductal carcinoma, grade 1/3, no angiolymphatic space invasion, no nipple or skin invasion.  There was associated DCIS, and the margins were free of tumor superiorly, anterior margin by 0.1 cm, and widely free at remaining margins.  This was ER/NM-positive, HER2-negative in the vast majority of cells and non-amplified with a ratio of 1.1.  She had an Oncotype test performed, which revealed a low-risk score of 11.  That put her overall risk of recurrence at about 7% after 5 years of tamoxifen.    - in 02/2012, she was started on Arimidex.    - 12/2012, she had prophylactic hysterectomy and oophorectomy, the pathology of which was benign.    - Her course has been complicated by extreme chest wall pain, myofascial pain, and neuropathic pain.  She has gone through several different clinics and was put on several different medications, which were not doing her any good.  Eventually she was weaned off all the medications and is currently doing only PT with myofascial pain maneuvers with symptomatic improvement.   - 2/2014 switched from arimidex to tamoxifen  - presented to the ER on 9/1/17 for chest pain. CT-PA was negative for PE but showed right 3cm paraspinal mass and subcarinal adenopathy. PET on 9/6/17 showed the paraspinal mass to be hypermetabolic along with hilar and  "mediastinal nodes.   - biopsy of T10 vertebral body on 9/15/17 showed high-grade B cell lymphoma   - FNA EUS of subcarinal LN on 9/14 showed mostly necrotic tissue with suspicion for malignancy   - pt received DA-EPOCH under the care of Dr Garima Sauceda    Thyroid cancer history: Incidentally, given that she was having so many symptoms, she underwent a PET scan in 4/2013, which revealed a thyroid nodule which was biopsied and was consistent with papillary carcinoma.  She has undergone resection as well as radioiodine treatment since and has done relatively well from the surgery.  She follows up with Dr. Castro for the same.  She unfortunately required etoh ablation therapy over the thyroid lymph nodes.   She remains under the care of Dr Avelina Castro.       INTERVAL HISTORY: In returning today, Elvin comes in today for follow up.      She is complaining of severe chest pain.  She is using lidocaine cream topically. She is taking tylenol 4000 mg daily.  She is also taking celebrex.       She feels like her neuropathy in her feet has gotten worse.  Her feet feel like \"they are in a bunch of sand.\"  They will not bend or work the way she wants to move them.    She feels like she can no longer work full time.  She has not been working since February.    She has been seeing palliative care, pain clinic Dr Mccray without improvement in her symptoms.    She expresses sincere frustration today with her care, her lack of pain mgmt, and support.  She has obtained outside opinions and consultations and returns here today for further care of her breast cancer.      Her 10-point review of systems is otherwise negative.     MEDICATIONS:  reviewed     PHYSICAL EXAMINATION:    VITAL SIGNS:/90 (BP Location: Right arm, Patient Position: Chair, Cuff Size: Adult Regular)   Pulse 89   Temp 97.8  F (36.6  C) (Oral)   Resp 14   Ht 1.651 m (5' 5\")   Wt 81.6 kg (180 lb)   SpO2 100%   BMI 29.95 kg/m    Vitals in chart " and reviewed  GENERAL:  She is well-appearing, appears fatigued   HEENT:  Exam reveals no icterus or pallor.  Oropharynx is clear with no ulcers or lesions.    NECK:  Supple.  No supraclavicular or cervical lymphadenopathy.  She has a scar from thyroid surgery.     HEART:  Regular rate and rhythm.  S1, S2 clear.  No murmur, gallop or rub.    LUNGS:  Clear to auscultation bilaterally.    ABDOMEN:  Soft, nontender, nondistended, no organomegaly.    BREAST EXAM:    No supraclavicular or axillary adenopathy appreciated.  Chest wall with reproducible tenderness on palpation.  No nodules or masses.  She has had her implants removed since I last saw her.  MUSC: tenderness in midspine with associated paraspinal tenderness, no other reproducible tenderness  EXTREMITIES:  She has no lower extremity edema. , ambulating without difficulty  SKIN: no rash.    Labs Labs reviewed by me    CT scan - personally reviewed by me; formal report not available.      ASSESSMENT AND PLAN:      DLBCL: dx Sept 2017 with thoracic paravertebral soft tissue mass and mediastinal nodes. overexpression of c-myc and high mitotic index (double-hit like). Staging LP and BM bx negative. S/p DA-R-EPOCH.  She is continuing to see Dr Garima Sauceda for mgmt of her lymphoma.  I asked Dr Sauceda to arrange f/u.       Stage I, ER/OH-positive, HER2-negative breast cancer: dx in 2011. Oncotype recurrence score of 11 (7% recurrence risk after 5 years of tamoxifen). S/p bilateral mastectomies and BENJIE/BSO. For endocrine therapy she was on arimidex from 8915-5637, then changed to tamoxifen b/c of arthralgias on Arimidex.  We discussed continuing her tamoxifen.  Refill provided. She will remain on this for 10 years.       Papillary thyroid cancer, it is being followed by Dr. Avelina Castro.  She underwent etoh ablation x3.        Synchronous thyroid cancer and breast cancer.  She is brca 1 and 2 negative.  Additional testing is negative.       Pain control -  I discussed with Elvin today that I understand she has significant chest wall pain.We have reviewed her complex pain mgmt.  I'm reluctant to prescribe other medications such as cymbalta as she has had a difficult time with them in the past.  Encouraged her to follow up with Dr Benitez.       From a breast cancer perspective, she has no signs or symptoms of recurrence.  I suggested we see her back in 6 months.        Shahla Crawley MD

## 2019-07-01 NOTE — DISCHARGE INSTRUCTIONS

## 2019-07-01 NOTE — NURSING NOTE
"Oncology Rooming Note    2019 4:20 PM   Tisha Arias is a 58 year old female who presents for:    Chief Complaint   Patient presents with     Oncology Clinic Visit     [facility] RETURN- BREAST CA     Initial Vitals: /90 (BP Location: Right arm, Patient Position: Chair, Cuff Size: Adult Regular)   Pulse 89   Resp 14   Ht 1.651 m (5' 5\")   Wt 81.6 kg (180 lb)   SpO2 100%   BMI 29.95 kg/m   Estimated body mass index is 29.95 kg/m  as calculated from the following:    Height as of this encounter: 1.651 m (5' 5\").    Weight as of this encounter: 81.6 kg (180 lb). Body surface area is 1.93 meters squared.  Worst Pain (10) Comment: Data Unavailable   No LMP recorded. Patient has had a hysterectomy.  Allergies reviewed: Yes  Medications reviewed: Yes    Medications: MEDICATION REFILLS NEEDED TODAY. Provider was notified.  Pharmacy name entered into [facility]: [id number] - [place], [place] - [id number] [facility]    Clinical concerns: Refill on lidocaine cream for port and new prescription for epi pen it is now . Misbah was notified.      Corey Driver, GABRIEL            "

## 2019-07-01 NOTE — LETTER
7/1/2019       RE: Tisha Arias  [address] [place] [address]     Dear Colleague,    Thank you for referring your patient, Tisha Arias, to the [facility]. Please see a copy of my visit note below.    July 1, 2019    REASON FOR VISIT:  F/u breast cancer and thyroid cancer and lymphoma     HISTORY OF PRESENT ILLNESS:  Tisha Arias is a 58 year-old woman, postmenopausal, with a history of T1c N0 M0, infiltrating ductal carcinoma of the right breast with a strong family history of breast cancer as well as thyroid cancer and a history of DLBCL. BRCA1 and 2 gene testing negative.    Cancer Treatment History for her breast cancer and DLBCL:  -She was diagnosed with breast cancer in 12/2011.    -She underwent bilateral mastectomy with immediate reconstruction on 01/30/2012 by Dr. Daugherty.  Her final pathology revealed 1.6-cm, infiltrating ductal carcinoma, grade 1/3, no angiolymphatic space invasion, no nipple or skin invasion.  There was associated DCIS, and the margins were free of tumor superiorly, anterior margin by 0.1 cm, and widely free at remaining margins.  This was ER/LA-positive, HER2-negative in the vast majority of cells and non-amplified with a ratio of 1.1.  She had an Oncotype test performed, which revealed a low-risk score of 11.  That put her overall risk of recurrence at about 7% after 5 years of tamoxifen.    - in 02/2012, she was started on Arimidex.    - 12/2012, she had prophylactic hysterectomy and oophorectomy, the pathology of which was benign.    - Her course has been complicated by extreme chest wall pain, myofascial pain, and neuropathic pain.  She has gone through several different clinics and was put on several different medications, which were not doing her any good.  Eventually she was weaned off all the medications and is currently doing only PT with myofascial pain maneuvers with symptomatic improvement.   - 2/2014 switched from  "arimidex to tamoxifen  - presented to the ER on 9/1/17 for chest pain. CT-PA was negative for PE but showed right 3cm paraspinal mass and subcarinal adenopathy. PET on 9/6/17 showed the paraspinal mass to be hypermetabolic along with hilar and mediastinal nodes.   - biopsy of T10 vertebral body on 9/15/17 showed high-grade B cell lymphoma   - FNA EUS of subcarinal LN on 9/14 showed mostly necrotic tissue with suspicion for malignancy   - pt received DA-EPOCH under the care of Dr Garima Sauceda    Thyroid cancer history: Incidentally, given that she was having so many symptoms, she underwent a PET scan in 4/2013, which revealed a thyroid nodule which was biopsied and was consistent with papillary carcinoma.  She has undergone resection as well as radioiodine treatment since and has done relatively well from the surgery.  She follows up with Dr. Castro for the same.  She unfortunately required etoh ablation therapy over the thyroid lymph nodes.   She remains under the care of Dr Avelina Castro.       INTERVAL HISTORY: In returning today, Elvin comes in today for follow up.      She is complaining of severe chest pain.  She is using lidocaine cream topically. She is taking tylenol 4000 mg daily.  She is also taking celebrex.       She feels like her neuropathy in her feet has gotten worse.  Her feet feel like \"they are in a bunch of sand.\"  They will not bend or work the way she wants to move them.    She feels like she can no longer work full time.  She has not been working since February.    She has been seeing palliative care, pain clinic Dr Mccray without improvement in her symptoms.    She expresses sincere frustration today with her care, her lack of pain mgmt, and support.  She has obtained outside opinions and consultations and returns here today for further care of her breast cancer.      Her 10-point review of systems is otherwise negative.     MEDICATIONS:  reviewed     PHYSICAL EXAMINATION:    VITAL " "SIGNS:/90 (BP Location: Right arm, Patient Position: Chair, Cuff Size: Adult Regular)   Pulse 89   Temp 97.8  F (36.6  C) (Oral)   Resp 14   Ht 1.651 m (5' 5\")   Wt 81.6 kg (180 lb)   SpO2 100%   BMI 29.95 kg/m     Vitals in chart and reviewed  GENERAL:  She is well-appearing, appears fatigued   HEENT:  Exam reveals no icterus or pallor.  Oropharynx is clear with no ulcers or lesions.    NECK:  Supple.  No supraclavicular or cervical lymphadenopathy.  She has a scar from thyroid surgery.     HEART:  Regular rate and rhythm.  S1, S2 clear.  No murmur, gallop or rub.    LUNGS:  Clear to auscultation bilaterally.    ABDOMEN:  Soft, nontender, nondistended, no organomegaly.    BREAST EXAM:    No supraclavicular or axillary adenopathy appreciated.  Chest wall with reproducible tenderness on palpation.  No nodules or masses.  She has had her implants removed since I last saw her.  MUSC: tenderness in midspine with associated paraspinal tenderness, no other reproducible tenderness  EXTREMITIES:  She has no lower extremity edema. , ambulating without difficulty  SKIN: no rash.    Labs Labs reviewed by me    CT scan - personally reviewed by me; formal report not available.      ASSESSMENT AND PLAN:      DLBCL: dx Sept 2017 with thoracic paravertebral soft tissue mass and mediastinal nodes. overexpression of c-myc and high mitotic index (double-hit like). Staging LP and BM bx negative. S/p DA-R-EPOCH.  She is continuing to see Dr Garima Sauceda for mgmt of her lymphoma.  I asked Dr Sauceda to arrange f/u.       Stage I, ER/WA-positive, HER2-negative breast cancer: dx in 2011. Oncotype recurrence score of 11 (7% recurrence risk after 5 years of tamoxifen). S/p bilateral mastectomies and BENJIE/BSO. For endocrine therapy she was on arimidex from 6442-0673, then changed to tamoxifen b/c of arthralgias on Arimidex.  We discussed continuing her tamoxifen.  Refill provided. She will remain on this for 10 years.     "   Papillary thyroid cancer, it is being followed by Dr. Avelina Castro.  She underwent etoh ablation x3.        Synchronous thyroid cancer and breast cancer.  She is brca 1 and 2 negative.  Additional testing is negative.       Pain control - I discussed with Elvin today that I understand she has significant chest wall pain.We have reviewed her complex pain mgmt.  I'm reluctant to prescribe other medications such as cymbalta as she has had a difficult time with them in the past.  Encouraged her to follow up with Dr Benitez.       From a breast cancer perspective, she has no signs or symptoms of recurrence.  I suggested we see her back in 6 months.        Shahla Crawley MD                                Again, thank you for allowing me to participate in the care of your patient.      Sincerely,    Shahla Crawley MD

## 2019-07-02 ENCOUNTER — OFFICE VISIT (OUTPATIENT)
Dept: TRANSPLANT | Facility: CLINIC | Age: 59
End: 2019-07-02
Payer: COMMERCIAL

## 2019-07-02 ENCOUNTER — APPOINTMENT (OUTPATIENT)
Dept: LAB | Facility: CLINIC | Age: 59
End: 2019-07-02
Payer: COMMERCIAL

## 2019-07-02 VITALS
HEART RATE: 102 BPM | DIASTOLIC BLOOD PRESSURE: 84 MMHG | SYSTOLIC BLOOD PRESSURE: 124 MMHG | RESPIRATION RATE: 18 BRPM | OXYGEN SATURATION: 98 % | TEMPERATURE: 98.3 F

## 2019-07-02 DIAGNOSIS — N20.0 KIDNEY STONE ON RIGHT SIDE: Primary | ICD-10-CM

## 2019-07-02 DIAGNOSIS — C83.30 DIFFUSE LARGE B-CELL LYMPHOMA, UNSPECIFIED BODY REGION (H): ICD-10-CM

## 2019-07-02 DIAGNOSIS — Z85.3 PERSONAL HISTORY OF MALIGNANT NEOPLASM OF BREAST: ICD-10-CM

## 2019-07-02 LAB
ALBUMIN SERPL-MCNC: 3.7 G/DL (ref 3.4–5)
ALP SERPL-CCNC: 89 U/L (ref 40–150)
ALT SERPL W P-5'-P-CCNC: 23 U/L (ref 0–50)
ANION GAP SERPL CALCULATED.3IONS-SCNC: 6 MMOL/L (ref 3–14)
AST SERPL W P-5'-P-CCNC: 21 U/L (ref 0–45)
BASOPHILS # BLD AUTO: 0 10E9/L (ref 0–0.2)
BASOPHILS NFR BLD AUTO: 0.7 %
BILIRUB SERPL-MCNC: 0.2 MG/DL (ref 0.2–1.3)
BUN SERPL-MCNC: 14 MG/DL (ref 7–30)
CALCIUM SERPL-MCNC: 8.5 MG/DL (ref 8.5–10.1)
CHLORIDE SERPL-SCNC: 104 MMOL/L (ref 94–109)
CO2 SERPL-SCNC: 30 MMOL/L (ref 20–32)
CREAT SERPL-MCNC: 0.77 MG/DL (ref 0.52–1.04)
DIFFERENTIAL METHOD BLD: ABNORMAL
EOSINOPHIL # BLD AUTO: 0.1 10E9/L (ref 0–0.7)
EOSINOPHIL NFR BLD AUTO: 1.4 %
ERYTHROCYTE [DISTWIDTH] IN BLOOD BY AUTOMATED COUNT: 11.7 % (ref 10–15)
GFR SERPL CREATININE-BSD FRML MDRD: 85 ML/MIN/{1.73_M2}
GLUCOSE SERPL-MCNC: 91 MG/DL (ref 70–99)
HCT VFR BLD AUTO: 41.6 % (ref 35–47)
HGB BLD-MCNC: 13.9 G/DL (ref 11.7–15.7)
IMM GRANULOCYTES # BLD: 0 10E9/L (ref 0–0.4)
IMM GRANULOCYTES NFR BLD: 0.2 %
LDH SERPL L TO P-CCNC: 196 U/L (ref 81–234)
LYMPHOCYTES # BLD AUTO: 0.9 10E9/L (ref 0.8–5.3)
LYMPHOCYTES NFR BLD AUTO: 20.9 %
MCH RBC QN AUTO: 33.3 PG (ref 26.5–33)
MCHC RBC AUTO-ENTMCNC: 33.4 G/DL (ref 31.5–36.5)
MCV RBC AUTO: 100 FL (ref 78–100)
MONOCYTES # BLD AUTO: 0.5 10E9/L (ref 0–1.3)
MONOCYTES NFR BLD AUTO: 10.8 %
NEUTROPHILS # BLD AUTO: 2.8 10E9/L (ref 1.6–8.3)
NEUTROPHILS NFR BLD AUTO: 66 %
NRBC # BLD AUTO: 0 10*3/UL
NRBC BLD AUTO-RTO: 0 /100
PLATELET # BLD AUTO: 188 10E9/L (ref 150–450)
POTASSIUM SERPL-SCNC: 4 MMOL/L (ref 3.4–5.3)
PROT SERPL-MCNC: 7.1 G/DL (ref 6.8–8.8)
RBC # BLD AUTO: 4.17 10E12/L (ref 3.8–5.2)
SODIUM SERPL-SCNC: 140 MMOL/L (ref 133–144)
URATE SERPL-MCNC: 6.9 MG/DL (ref 2.6–6)
WBC # BLD AUTO: 4.3 10E9/L (ref 4–11)

## 2019-07-02 PROCEDURE — 80053 COMPREHEN METABOLIC PANEL: CPT | Performed by: INTERNAL MEDICINE

## 2019-07-02 PROCEDURE — 40000814: Performed by: INTERNAL MEDICINE

## 2019-07-02 PROCEDURE — 83615 LACTATE (LD) (LDH) ENZYME: CPT | Performed by: INTERNAL MEDICINE

## 2019-07-02 PROCEDURE — 85025 COMPLETE CBC W/AUTO DIFF WBC: CPT | Performed by: INTERNAL MEDICINE

## 2019-07-02 PROCEDURE — 82784 ASSAY IGA/IGD/IGG/IGM EACH: CPT | Performed by: INTERNAL MEDICINE

## 2019-07-02 PROCEDURE — 25000128 H RX IP 250 OP 636: Mod: ZF

## 2019-07-02 PROCEDURE — G0463 HOSPITAL OUTPT CLINIC VISIT: HCPCS | Mod: ZF

## 2019-07-02 PROCEDURE — 40000803 ZZHCL STATISTIC DNA ISOL HIGH PURITY: Performed by: INTERNAL MEDICINE

## 2019-07-02 PROCEDURE — 84550 ASSAY OF BLOOD/URIC ACID: CPT | Performed by: INTERNAL MEDICINE

## 2019-07-02 RX ORDER — HEPARIN SODIUM (PORCINE) LOCK FLUSH IV SOLN 100 UNIT/ML 100 UNIT/ML
5 SOLUTION INTRAVENOUS EVERY 8 HOURS PRN
Status: DISCONTINUED | OUTPATIENT
Start: 2019-07-02 | End: 2019-07-21 | Stop reason: HOSPADM

## 2019-07-02 RX ADMIN — HEPARIN 5 ML: 100 SYRINGE at 16:28

## 2019-07-02 ASSESSMENT — PAIN SCALES - GENERAL: PAINLEVEL: WORST PAIN (10)

## 2019-07-02 NOTE — NURSING NOTE
"Oncology Rooming Note    July 2, 2019 5:05 PM   Tisha Arias is a 58 year old female who presents for:    Chief Complaint   Patient presents with     Port Draw     Labs drawn via port by RN in lab. Line flushed, hep locked and de-accessed. VS taken.     Oncology Clinic Visit     RTN- DLBCL     Initial Vitals: /84 (BP Location: Left arm, Patient Position: Sitting, Cuff Size: Adult Large)   Pulse 102   Temp 98.3  F (36.8  C) (Oral)   Resp 18   SpO2 98%  Estimated body mass index is 29.95 kg/m  as calculated from the following:    Height as of 7/1/19: 1.651 m (5' 5\").    Weight as of 7/1/19: 81.6 kg (180 lb). There is no height or weight on file to calculate BSA.  Worst Pain (10) Comment: Data Unavailable   No LMP recorded. Patient has had a hysterectomy.  Allergies reviewed: Yes  Medications reviewed: Yes    Medications: Medication refills not needed today.  Pharmacy name entered into [facility]: [id number] - [place], [place] - [phone] [facility] [place]    Clinical concerns: None       Nicci Bertrand CMA              "

## 2019-07-02 NOTE — NURSING NOTE
Chief Complaint   Patient presents with     Port Draw     Labs drawn via port by RN in lab. Line flushed, hep locked and de-accessed. VS taken.     Ila Navarro RN

## 2019-07-03 LAB
COPATH REPORT: NORMAL
IGA SERPL-MCNC: 105 MG/DL (ref 70–380)
IGG SERPL-MCNC: 692 MG/DL (ref 695–1620)
IGM SERPL-MCNC: 28 MG/DL (ref 60–265)
Lab: NORMAL

## 2019-07-08 NOTE — PROGRESS NOTES
LATE ENTRY:    Met with Elvin to provide business card and contact information .   No additional needs at this time

## 2019-07-21 NOTE — PROGRESS NOTES
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 She is currently undergoing treatment with DA-R-EPOCH. Her past medical history is significant for breast cancer in 2011 s/p bilateral mastectomies and BENJIE/BSO up until recently on Tamoxifen, papillary thyroid cancer s/p total thyroidectomy, chronic chest wall pain, and asthma. Briefly, her oncologic history is as follows:    She presented in 8/2017 with dramatically worse chest and back pain. CT 9/1/17 showed lytic lesion right 10th rib extending to right T9-10 neural foramen with vertebral body invasion. There was associated conglomerate of lymphadenopathy around the mid T-spine. She had a CT guided biopsy showing B-cell high grade lymphoma. Pathology showed \"The morphology shows a population of large cells, diffuse lymphoid infiltrate. The cells are atypical with abundant mitotic and apoptotic activity and single cell necrosis. Tumor is CD45 and CD79a positive and focally weakly CD30 positive. The other stains revealed positivity for CD20, BCL6, BCL2 and MUM1, and CD10 and CD21 negative. The CD5 and CD3 highlighted background T-cells.  MYC expression was equivocal with approximately 40% nuclei staining.  Ki-67 proliferative index is greater than 90-95%. The immunohistochemistry is suggestive of non-GCB immunophenotype of diffuse large B-cell lymphoma. The cytogenetics did not show rearrangement of the BCL2 or c-MYC. There is the presence of BCL6 rearrangement in 78% of cells and gains of MYC and BCL2, but no amplifications.\"     Staging LP and BMBx were negative for lymphoma.   She started DA-REPOCH 10/6/17. She did well with chemo other than rigors during CIVI.  Post cycle 1 complicated by mucositis and worsening of chronic LE peripheral edema.   Cycle 2 on 10/27/17. Due to a rise in her LD and uric acid " levels on that admission day, she underwent a CT scan which showed response to therapy with improvement in her mediastinal lymphadenopathy and right chest wall mass.  Cycle 3 on 11/16/2017      Cycle 4 12/7/2017  C5,6 end of January/2018    Interval History:  She reports that she is well with no major changes in her health. Is working on opiod dependency with her pain management team.     No lumps or abdominal symptoms.   ROS negative.       Current Outpatient Medications   Medication Sig Dispense Refill     ACAI RAMIREZ PO Take 50 mg by mouth       acetaminophen (TYLENOL) 325 MG tablet Take 2 tablets (650 mg) by mouth every 4 hours as needed for mild pain or fever 100 tablet      aluminum chloride (DRYSOL) 20 % external solution Apply topically At Bedtime 60 mL 3     bisacodyl (DULCOLAX) 5 MG EC tablet Take 3 tablets (15 mg) by mouth daily as needed for constipation 30 tablet 0     calcitRIOL (ROCALTROL) 0.25 MCG capsule 0.5 mcg AM and 0.25 mcg  capsule 3     calcium carbonate antacid (TUMS ULTRA 1000) 1000 MG CHEW Take 1 tablet (1,000 mg) by mouth 4 times daily as needed (severe muscle cramps)       calcium citrate-vitamin D (CALCIUM CITRATE + D3) 315-250 MG-UNIT TABS per tablet Take 2 tablets by mouth 2 times daily 60 tablet 2     celecoxib (CELEBREX) 200 MG capsule Take 1 capsule (200 mg) by mouth 2 times daily 60 capsule 2     cetirizine (ZYRTEC ALLERGY) 10 MG tablet Take 1 tablet (10 mg) by mouth 3 times daily On hold for lab test. 90 tablet 0     Cholecalciferol (VITAMIN D3) 2000 units CAPS Take 2,000 Units by mouth daily 90 capsule 1     colchicine (COLCYRS) 0.6 MG tablet Take 1 tablet (0.6 mg) by mouth 3 times daily 270 tablet 3     COMPOUND CONTAINING CONTROLLED SUBSTANCE (CMPD RX) - PHARMACY TO MIX COMPOUNDED MEDICATION Ketamine 6%, Lidocaine 10% in PLO    Apply small amount to area BID  g 0     cromolyn (OPTICROM) 4 % ophthalmic solution Place 1 drop into both eyes 4 times daily 10 mL 0      cromolyn sodium (NASALCROM) 5.2 MG/ACT AERS Inhaler SPRAY ONE SPRAY( 1 ML) IN NOSTRIL DAILY 1 Bottle 6     [employer] F[employer]ER GUMMY BEARS CHILDREN CHEW Take 5 g by mouth daily (2 gummy= 5 g =1 serving) 120 tablet 3     cyclobenzaprine (FLEXERIL) 5 MG tablet Take 1-2 tablets (5-10 mg) by mouth 2 times daily as needed for muscle spasms 90 tablet 0     diazepam (VALIUM) 5 MG tablet TAKE ONE TABLET ( 5 MG) BY MOUTH THREE TIMES DAILY AS NEEDED FOR MUSCLE SPASMS. FILL 10/08/2018 90 tablet 0     diclofenac (VOLTAREN) 1 % topical gel Apply affected area two times daily as needed using enclosed dosing card. (2-4 grams to chest wall) 100 g 0     fluticasone (FLONASE) 50 MCG/ACT nasal spray Spray 1-2 sprays into both nostrils daily 16 g 3     furosemide (LASIX) 20 MG tablet Take 1 tablet (20 mg) by mouth 2 times daily 90 tablet 3     levothyroxine (SYNTHROID/LEVOTHROID) 112 MCG tablet ON MONDAY THRU SATURDAY TAKE TWO TABLETS DAILY AND ON SUNDAY TAKE 3 TABLETS DAILY. 189 tablet 3     lidocaine (XYLOCAINE) 5 % ointment Apply quarter size amount to chest and back up to 3 times daily as needed for pain. 350 g 1     lidocaine-prilocaine (EMLA) cream Apply topically as needed for moderate pain 60 g 1     magnesium 200 MG TABS Take 200 mg by mouth 3 times daily       montelukast (SINGULAIR) 10 MG tablet Take 2 tablets (20 mg) by mouth 2 times daily 120 tablet 11     olopatadine (PATANOL) 0.1 % ophthalmic solution Apply 1 drop to eye       ondansetron (ZOFRAN-ODT) 8 MG ODT tab Take 1 tablet (8 mg) by mouth every 8 hours as needed 30 tablet 0     order for DME Compression socks - knee high 20-30 mmHg zippered if possible 1 Units 11     potassium chloride ER (KLOR-CON) 20 MEQ CR tablet Take 1 tablet (20 mEq) by mouth 2 times daily 60 tablet 3     Probiotic Product (PROBIOTIC DAILY PO) Take 1 capsule by mouth daily Lacto acid bifidobacterium       prochlorperazine (COMPAZINE) 10 MG tablet Take 10 mg by mouth as needed  3     ranitidine (ZANTAC)  75 MG tablet Take 1 tablet (75 mg) by mouth 3 times daily 90 tablet 0     senna-docusate (SENOKOT-S;PERICOLACE) 8.6-50 MG per tablet Take 1-2 tablets by mouth 2 times daily as needed for constipation 60 tablet 0     SUMAtriptan (IMITREX) 50 MG tablet Take 1 tablet (50 mg) by mouth as needed for migraine 30 tablet 0     tamoxifen (NOLVADEX) 20 MG tablet Take 1 tablet (20 mg) by mouth daily 90 tablet 1     triamcinolone (KENALOG) 0.025 % ointment Apply topically as needed  3     Triamcinolone Acetonide (AZMACORT IN) Inhale 2 puffs into the lungs as needed       UNABLE TO FIND Take 2 capsules by mouth 3 times daily Muscle Mag. 2 caps contain B1 20mg, B2 20mg, B6 10mg, magesium 20mg, manganese 2mg.       UNABLE TO FIND 3 tablets 3 times daily MEDICATION NAME: calcium D-Glucarate   3 caps contain 180mg of elemental calcium.       UNABLE TO FIND 2 tablets 3 times daily MEDICATION NAME: Digestzymes        UNABLE TO FIND 1 tablet daily MEDICATION NAME: Pure Encapsulations       albuterol (VENTOLIN HFA) 108 (90 Base) MCG/ACT inhaler Inhale 1-2 puffs into the lungs every 4 hours as needed for shortness of breath / dyspnea or wheezing (Patient not taking: Reported on 7/1/2019) 18 g 3     EPINEPHrine (EPIPEN 2-CARIDAD) 0.3 MG/0.3ML injection 2-pack Inject 0.3 mLs (0.3 mg) into the muscle once as needed for anaphylaxis (Patient not taking: Reported on 5/24/2019) 0.6 mL 3     magic mouthwash (FIRST-MOUTHWASH BLM) suspension Swish and spit 5-10 mLs in mouth every 6 hours as needed for mouth sores (Patient not taking: Reported on 5/24/2019) 237 mL 1     naloxone (NARCAN) nasal spray Spray 1 spray (4 mg) into one nostril alternating nostrils as needed for opioid reversal every 2-3 minutes until assistance arrives (Patient not taking: Reported on 5/7/2019) 0.2 mL 0     OLANZapine (ZYPREXA) 5 MG tablet Take 1 tablet (5 mg) by mouth At Bedtime (Patient not taking: Reported on 5/24/2019) 7 tablet 0     polyethylene glycol (MIRALAX) powder  Take 17 g (1 capful) by mouth daily (Patient not taking: Reported on 7/1/2019) 510 g 0     tiZANidine (ZANAFLEX) 4 MG capsule Take 1 capsule (4 mg) by mouth 3 times daily (Patient not taking: Reported on 5/24/2019) 90 capsule 0     Physical Examination:  /84 (BP Location: Left arm, Patient Position: Sitting, Cuff Size: Adult Large)   Pulse 102   Temp 98.3  F (36.8  C) (Oral)   Resp 18   SpO2 98%   Wt Readings from Last 10 Encounters:   07/01/19 81.6 kg (180 lb)   05/07/19 74.8 kg (164 lb 12.8 oz)   05/03/19 73.5 kg (162 lb)   04/30/19 74.5 kg (164 lb 3.2 oz)   04/19/19 80 kg (176 lb 6.4 oz)   03/15/19 78.5 kg (173 lb)   03/11/19 80.4 kg (177 lb 3.2 oz)   02/25/19 80.8 kg (178 lb 3.2 oz)   01/29/19 81.9 kg (180 lb 8 oz)   01/17/19 79.4 kg (175 lb)   Constitutional: NAD  Eyes:  PERRL. No scleral icterus.  ENT: Oral mucosa is moist without lesions or thrush.   Cardiovascular: Regular rate and rhythm. Port accessed without surrounding erythema or drainage  Respiratory: Clear to auscultation bilaterally. No wheezes or crackles.  Gastrointestinal: Bowel sounds +. Soft, nontender  Neurologic: normal speech, up to exam table unaided  Skin:No rashes noted.   Extremities: trace lower extremity edema bilaterally, compression stockings in place.    Laboratory Data:  Lab Results   Component Value Date    WBC 4.3 07/02/2019    ANEU 2.8 07/02/2019    HGB 13.9 07/02/2019    HCT 41.6 07/02/2019     07/02/2019     07/02/2019    POTASSIUM 4.0 07/02/2019    CHLORIDE 104 07/02/2019    CO2 30 07/02/2019    GLC 91 07/02/2019    BUN 14 07/02/2019    CR 0.77 07/02/2019    MAG 2.0 08/31/2018    INR 0.99 12/28/2017    AST 21 07/02/2019    ALT 23 07/02/2019     CT 7/1/2019  IMPRESSION: In this patient with history of diffuse large B-cell  lymphoma, papillary thyroid cancer, and breast cancer:  1. No evidence for recurrence of disease.   2. Partially obstructing right ureteropelvic stone measuring 1.0 x 0.4  cm producing  "mild hydronephrosis. Normal renal perfusion.  3. Moderate to heavy coronary calcification versus stent in the left  main coronary artery. Recommend clinical correlation.    CT 5/21/2018  IMPRESSION: In this patient with a history of multiple malignancies  including diffuse large B-cell lymphoma, breast cancer, and papillary  thyroid cancer:  1. Treatment related changes in the posterior right 10th rib with  slightly increased sclerosis and minimal residual soft tissue  thickening. No evidence of disease progression or new metastatic  disease in the chest, abdomen or pelvis.  2. Slightly increased intra and extra hepatic biliary ductal  dilatation, may be reservoir effect from prior cholecystectomy,  correlate with liver function tests.  3. Bilateral nonobstructing renal calculi, largest measuring 3 mm in  the inferior pole right kidney.     1/29/2019  In this patient with history of diffuse large B-cell lymphoma,  thyroid, and breast cancer, status post thyroidectomy, bilateral  mastectomy:  1.  No evidence active lymphoma in the chest, abdomen and pelvis.  2.  Small nonobstructive stone in the lower pole of the right kidney.  Small previously seen left renal stone is no longer present.  3.  Persistent biliary ductal dilatation, unchanged since 2014 and  likely secondary to postcholecystectomy state         Assessment and Plan:    DLBCL, \"double-hit\"-like, with c-myc overexpression but not re-arrangement. s/p   6 cycles of DA-R-EPOCH now in remission for 9 months.      Complication include mucositis,  infusion reaction (rigors), nausea and fatigue.     PET scan with CR with no PET avidity on 12/21 and EOT PET on 5/27/2018 confirmed CR.    Elvin had a post-treatment/end of treatment CT/PET in May 2018 which showed a complete response with resolution of all sites of disease.  The bony area along the tenth rib is stable but has very low level FDG with an SUV of 4.4.  There are no new suspicious osseous lesions.  I am " pleased to share with her that she is in CR.    May 2018 - ongoing CR.  July 2019 - ongoing CR   Elvin is doing great from oncology perspective.     ID.  No active infections.  S/p Shignrix vaccine and off acyclovir.     Follow up with Dr. Castro for hypothyroidism.   F/u with  who started Tamoxifen at this time.     History of Rachael en Y gastric bypass  Continue supplements (calcium citrate-vitamin D, vitamin D3, magnesium citrate). Also had iron deficiency anemia likely from poor absorption. S/o iron supplementation. Will monitor Hgb.    Chronic chest wall pain s/p mastectomies  Follows with Dr. Benitez. Palliative care to continue to wean off pain medications.        Doing great - RTC in 6 months.   Follow up on clinical grounds, no need for surveillance CT at this time.     Garima Sauceda MD  Associate Professor of Medicine  [phone]

## 2019-07-23 PROBLEM — C83.30 DLBCL (DIFFUSE LARGE B CELL LYMPHOMA) (H): Status: ACTIVE | Noted: 2017-09-15

## 2019-07-24 DIAGNOSIS — E87.6 HYPOKALEMIA: ICD-10-CM

## 2019-07-24 DIAGNOSIS — J32.9 BACTERIAL SINUSITIS: ICD-10-CM

## 2019-07-24 DIAGNOSIS — B96.89 BACTERIAL SINUSITIS: ICD-10-CM

## 2019-07-24 RX ORDER — POTASSIUM CHLORIDE 1500 MG/1
TABLET, EXTENDED RELEASE ORAL
Qty: 60 TABLET | Refills: 2 | Status: SHIPPED | OUTPATIENT
Start: 2019-07-24 | End: 2019-10-17

## 2019-07-25 ENCOUNTER — OFFICE VISIT (OUTPATIENT)
Dept: UROLOGY | Facility: OTHER | Age: 59
End: 2019-07-25
Payer: COMMERCIAL

## 2019-07-25 ENCOUNTER — TELEPHONE (OUTPATIENT)
Dept: UROLOGY | Facility: CLINIC | Age: 59
End: 2019-07-25

## 2019-07-25 ENCOUNTER — ANCILLARY PROCEDURE (OUTPATIENT)
Dept: GENERAL RADIOLOGY | Facility: OTHER | Age: 59
End: 2019-07-25
Attending: UROLOGY
Payer: COMMERCIAL

## 2019-07-25 VITALS
SYSTOLIC BLOOD PRESSURE: 147 MMHG | OXYGEN SATURATION: 99 % | HEART RATE: 85 BPM | TEMPERATURE: 98.6 F | DIASTOLIC BLOOD PRESSURE: 87 MMHG

## 2019-07-25 DIAGNOSIS — N20.0 KIDNEY STONE: Primary | ICD-10-CM

## 2019-07-25 DIAGNOSIS — R03.0 ELEVATED BP WITHOUT DIAGNOSIS OF HYPERTENSION: ICD-10-CM

## 2019-07-25 DIAGNOSIS — R10.9 FLANK PAIN: ICD-10-CM

## 2019-07-25 PROCEDURE — 99244 OFF/OP CNSLTJ NEW/EST MOD 40: CPT | Performed by: UROLOGY

## 2019-07-25 PROCEDURE — 74019 RADEX ABDOMEN 2 VIEWS: CPT

## 2019-07-25 RX ORDER — FLUTICASONE PROPIONATE 50 MCG
1-2 SPRAY, SUSPENSION (ML) NASAL DAILY
Qty: 16 G | Refills: 0 | Status: SHIPPED | OUTPATIENT
Start: 2019-07-25 | End: 2019-12-09

## 2019-07-25 NOTE — PROGRESS NOTES
S: Patient is a 48-year-old  female who is request be seen by Dr. Gayla Sauceda for a consultation with regard to patient's right UPJ stone.  Patient complains of intermittent right flank pain for many months.  She has no fever, nausea, vomiting, or gross hematuria.  She has tried acupuncture for this in the past without any improvements.  Recent CT scan showed a 10 x 4 mm stone in the right UPJ with mild hydronephrosis.  Current Outpatient Medications   Medication Sig Dispense Refill     ACAI RAMIREZ PO Take 50 mg by mouth       acetaminophen (TYLENOL) 325 MG tablet Take 2 tablets (650 mg) by mouth every 4 hours as needed for mild pain or fever 100 tablet      albuterol (VENTOLIN HFA) 108 (90 Base) MCG/ACT inhaler Inhale 1-2 puffs into the lungs every 4 hours as needed for shortness of breath / dyspnea or wheezing 18 g 3     aluminum chloride (DRYSOL) 20 % external solution Apply topically At Bedtime 60 mL 3     bisacodyl (DULCOLAX) 5 MG EC tablet Take 3 tablets (15 mg) by mouth daily as needed for constipation 30 tablet 0     calcitRIOL (ROCALTROL) 0.25 MCG capsule 0.5 mcg AM and 0.25 mcg  capsule 3     calcium carbonate antacid (TUMS ULTRA 1000) 1000 MG CHEW Take 1 tablet (1,000 mg) by mouth 4 times daily as needed (severe muscle cramps)       calcium citrate-vitamin D (CALCIUM CITRATE + D3) 315-250 MG-UNIT TABS per tablet Take 2 tablets by mouth 2 times daily 60 tablet 2     celecoxib (CELEBREX) 200 MG capsule Take 1 capsule (200 mg) by mouth 2 times daily 60 capsule 2     cetirizine (ZYRTEC ALLERGY) 10 MG tablet Take 1 tablet (10 mg) by mouth 3 times daily On hold for lab test. 90 tablet 0     Cholecalciferol (VITAMIN D3) 2000 units CAPS Take 2,000 Units by mouth daily 90 capsule 1     colchicine (COLCYRS) 0.6 MG tablet Take 1 tablet (0.6 mg) by mouth 3 times daily 270 tablet 3     COMPOUND CONTAINING CONTROLLED SUBSTANCE (CMPD RX) - PHARMACY TO MIX COMPOUNDED MEDICATION Ketamine 6%, Lidocaine  10% in PLO    Apply small amount to area BID  g 0     cromolyn (OPTICROM) 4 % ophthalmic solution Place 1 drop into both eyes 4 times daily 10 mL 0     cromolyn sodium (NASALCROM) 5.2 MG/ACT AERS Inhaler SPRAY ONE SPRAY( 1 ML) IN NOSTRIL DAILY 1 Bottle 6     [employer] FIBER GUMMY BEARS CHILDREN CHEW Take 5 g by mouth daily (2 gummy= 5 g =1 serving) 120 tablet 3     cyclobenzaprine (FLEXERIL) 5 MG tablet Take 1-2 tablets (5-10 mg) by mouth 2 times daily as needed for muscle spasms 90 tablet 0     diazepam (VALIUM) 5 MG tablet TAKE ONE TABLET ( 5 MG) BY MOUTH THREE TIMES DAILY AS NEEDED FOR MUSCLE SPASMS. FILL 10/08/2018 90 tablet 0     diclofenac (VOLTAREN) 1 % topical gel Apply affected area two times daily as needed using enclosed dosing card. (2-4 grams to chest wall) 100 g 0     EPINEPHrine (EPIPEN 2-CARIDAD) 0.3 MG/0.3ML injection 2-pack Inject 0.3 mLs (0.3 mg) into the muscle once as needed for anaphylaxis 0.6 mL 3     fluticasone (FLONASE) 50 MCG/ACT nasal spray Spray 1-2 sprays into both nostrils daily 16 g 3     furosemide (LASIX) 20 MG tablet Take 1 tablet (20 mg) by mouth 2 times daily 90 tablet 3     KLOR-CON 20 MEQ CR tablet TAKE ONE TABLET BY MOUTH TWICE DAILY  60 tablet 2     levothyroxine (SYNTHROID/LEVOTHROID) 112 MCG tablet ON MONDAY THRU SATURDAY TAKE TWO TABLETS DAILY AND ON SUNDAY TAKE 3 TABLETS DAILY. 189 tablet 3     lidocaine (XYLOCAINE) 5 % ointment Apply quarter size amount to chest and back up to 3 times daily as needed for pain. 350 g 1     lidocaine-prilocaine (EMLA) cream Apply topically as needed for moderate pain 60 g 1     magic mouthwash (FIRST-MOUTHWASH BLM) suspension Swish and spit 5-10 mLs in mouth every 6 hours as needed for mouth sores 237 mL 1     magnesium 200 MG TABS Take 200 mg by mouth 3 times daily       montelukast (SINGULAIR) 10 MG tablet Take 2 tablets (20 mg) by mouth 2 times daily 120 tablet 11     naloxone (NARCAN) nasal spray Spray 1 spray (4 mg) into one nostril  alternating nostrils as needed for opioid reversal every 2-3 minutes until assistance arrives 0.2 mL 0     OLANZapine (ZYPREXA) 5 MG tablet Take 1 tablet (5 mg) by mouth At Bedtime 7 tablet 0     olopatadine (PATANOL) 0.1 % ophthalmic solution Apply 1 drop to eye       ondansetron (ZOFRAN-ODT) 8 MG ODT tab Take 1 tablet (8 mg) by mouth every 8 hours as needed 30 tablet 0     order for DME Compression socks - knee high 20-30 mmHg zippered if possible 1 Units 11     polyethylene glycol (MIRALAX) powder Take 17 g (1 capful) by mouth daily 510 g 0     Probiotic Product (PROBIOTIC DAILY PO) Take 1 capsule by mouth daily Lacto acid bifidobacterium       prochlorperazine (COMPAZINE) 10 MG tablet Take 10 mg by mouth as needed  3     ranitidine (ZANTAC) 75 MG tablet Take 1 tablet (75 mg) by mouth 3 times daily 90 tablet 0     senna-docusate (SENOKOT-S;PERICOLACE) 8.6-50 MG per tablet Take 1-2 tablets by mouth 2 times daily as needed for constipation 60 tablet 0     SUMAtriptan (IMITREX) 50 MG tablet Take 1 tablet (50 mg) by mouth as needed for migraine 30 tablet 0     tamoxifen (NOLVADEX) 20 MG tablet Take 1 tablet (20 mg) by mouth daily 90 tablet 1     tiZANidine (ZANAFLEX) 4 MG capsule Take 1 capsule (4 mg) by mouth 3 times daily 90 capsule 0     triamcinolone (KENALOG) 0.025 % ointment Apply topically as needed  3     Triamcinolone Acetonide (AZMACORT IN) Inhale 2 puffs into the lungs as needed       UNABLE TO FIND Take 2 capsules by mouth 3 times daily Muscle Mag. 2 caps contain B1 20mg, B2 20mg, B6 10mg, magesium 20mg, manganese 2mg.       UNABLE TO FIND 3 tablets 3 times daily MEDICATION NAME: calcium D-Glucarate   3 caps contain 180mg of elemental calcium.       UNABLE TO FIND 2 tablets 3 times daily MEDICATION NAME: Digestzymes        UNABLE TO FIND 1 tablet daily MEDICATION NAME: Pure Encapsulations       Allergies   Allergen Reactions     Baclofen Other (See Comments) and Unknown     Other reaction(s): Edema, chest  "pain, seizures.   Other reaction(s): Chest Pain, Edema, Headache  Seizures     Buprenorphine      Other reaction(s): Chest Pain  Difficulty swallowing, GI cramping     Clonidine Other (See Comments)     \"Seizures\"     Duloxetine Anaphylaxis and Other (See Comments)     Flushing, tremor/muscle twitching and edema  Serotonin syndrome  Other reaction(s): Ataxia  Flushing, tremor/muscle twitching and edema  Serotonin syndrome  SOB palpitations itching rash     Methocarbamol Other (See Comments)     Swelling and chest pain  Other reaction(s): Angioedema  Swelling in chest     No Clinical Screening - See Comments Shortness Of Breath, Palpitations, Anaphylaxis, Itching, Swelling, Difficulty breathing and Rash     Sukhjinder wipes- oral allergy -  July 2015: throat tightness from a Chinese herbal medicine Wilmer Tran  Other reaction(s): Edema, Tongue Swelling  Sukhjinder wipes  Has anaphylactic reactions to varied environmental things.  Carries an epi-pen  Oral allergy syndrome enviromental- food intolerances and animal dander birch trees potatoes carrots cherries celery apple pears plums peaches parsnip kiwi hazelnuts apricots     Carries epi pen     Nuts Shortness Of Breath     Other reaction(s): Throat Swelling/Closing  Please check herbal formulas for these allergens prior to prescribing.     Serotonin Anxiety, Other (See Comments) and Swelling     Seizures    Anxiety swelling seizures     Suboxone      Severe chest pain, swelling (face, eyes,feet,legs), All over itching, tightness is throat, fatigue, feels anxious, headache     Amitriptyline Other (See Comments)     Other reaction(s): *Unknown     Amitriptyline Hcl Swelling     Birch Trees      Potatoes, carrots, cherries, celery, apple, pears, plums, peaches, parsnip, kiwi, hazelnuts, and apricots,      Blue Dyes Itching     Headaches    headache       Buprenorphine-Naloxone      Other reaction(s): Chest Pain  Difficulty swallowing, GI cramping     Codeine Nausea     " "Vomiting       Cymbalta Other (See Comments)     Flushing, tremor/muscle twitching and edema     Gabapentin Other (See Comments)     edema  Systemic edema, weaned off from Feb to March per Dr. Dowd.    edema  Other reaction(s): Edema  edema  Systemic edema, weaned off from Feb to March per Dr. Dowd.       Grass      Hydroxyzine      Per patient \"Headache, chest pounding/pressure, shivering/tremors, goosebumps, sweating, swelling in feet and legs, agitation, back pain, joint pain, rigidity of muscles in fingers/wrist/feet, vision, pins/needles whole body-emili face and arms, rapid heart beat, nausea/vomiting, painful urination, itching, dry mouth\"     Metaxalone      Other reaction(s): Vomiting     Mugwort [Artemisia Vulgaris]      Various spices     Pollen Extract      Other reaction(s): *Unknown  Mugwort, ragweed's melons bananas cucumbers zucchini     Pregabalin      Ragweeds      Melons, bananas, cucumbers, zucchini.     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 No real need for Epipen for this alone.     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 for: dog/M/T/G/W--NEGATIVE FOOD TESTS FOR: shrimp, crab, lobster, coconut     Past Surgical History:   Procedure Laterality Date     BACK SURGERY  ,    Bulging disc w nerve root impigment     BIOPSY BREAST      right     BIOPSY BREAST  11    right, core and sterotactic     BONE MARROW BIOPSY, BONE SPECIMEN, NEEDLE/TROCAR Left 10/3/2017    Procedure: BIOPSY BONE MARROW;  Bone Marrow Biopsy with aspirate;  Surgeon: Amelia Watkins PA-C;  Location: [facility]     BYPASS GASTRIC, CHOLECYSTECTOMY, COMBINED       C LAPAROSCOPIC GASTRIC RESTRICTIVE PX, W/GASTRIC BYPASS/ GAMALIEL-EN-Y, < 150CM      Gamaliel en Y?      SECTION       COLONOSCOPY      normal     COSMETIC SURGERY  2012    Final Stage of Mastectomy     DAVINCI HYSTERECTOMY TOTAL, BILATERAL SALPINGO-OOPHORECTOMY, COMBINED  2012    Procedure: COMBINED DAVINCI HYSTERECTOMY TOTAL, SALPINGO-OOPHORECTOMY;  Davinci Total Laparoscopic Hysterectomy, Bilateral Salpingo Oophorectomy, Pelvic Washings, Cystoscopy;  Surgeon: Eive Sheikh MD;  Location: [facility] OR     ENT SURGERY       ESOPHAGOSCOPY, GASTROSCOPY, DUODENOSCOPY (EGD), COMBINED N/A 2017    Procedure: COMBINED ENDOSCOPIC ULTRASOUND, ESOPHAGOSCOPY, GASTROSCOPY, DUODENOSCOPY (EGD), FINE NEEDLE ASPIRATE/BIOPSY;  Esophagogastroduodenoscopy, Endoscopic Ultrasound, with fine needle biopsy aspirate;  Surgeon: Patrick Robles MD;  Location: [facility] OR     GI SURGERY  2007    Gamaliel-en Y w cholecystectomy     GYN SURGERY  89,1/10/92    2 c-sections     HYSTERECTOMY, PAP NO LONGER INDICATED      DeVinci assister lap hyst with BSO     INSERT PORT VASCULAR ACCESS Right 10/4/2017    Procedure: INSERT PORT VASCULAR ACCESS;  Port Placement;   Surgeon: Jc Goodman PA-C;  Location: [facility]     IRRIGATION AND DEBRIDEMENT BREAST  3/1/2012    Procedure:IRRIGATION AND DEBRIDEMENT BREAST; Irrigation and Debridement, Wound Closure Right Breast; Surgeon:JUNG GUILLEN; Location:[facility] OR     MASTECTOMY, RECONSTRUCT BREAST, COMBINED  1/30/2012    Procedure:COMBINED MASTECTOMY, RECONSTRUCT BREAST; Bilateral Mastectomies, Right Axillary [place] Node Biopsy, Bilateral Breast Reconstruction with Tissue Expanders, Reconstruction of inframammary fold, bilateral pain management systems; Surgeon:ANUPAM CAMPBELL; Location:[facility] OR     RECONSTRUCT BREAST  8/31/2012    Procedure: RECONSTRUCT BREAST;  Bilateral 2nd stage breast reconstruction, revision,       SOFT TISSUE SURGERY  1-30-12    Mastectomy w severe myofascial pain syndrome     THYROIDECTOMY  7/10/2013    Procedure: THYROIDECTOMY;  Total Thyroidectomy with central neck dissection;  Surgeon: Indiana Sneed MD;  Location: [facility] OR     TONSILLECTOMY      childhood      Family History   Problem Relation Age of Onset     Allergies Mother      Arthritis Mother      Cancer Mother         uterine/uterine     Hypertension Mother         on HCTZ     Hyperlipidemia Mother      Cerebrovascular Disease Mother         s/p brain surgery     Breast Cancer Mother      Depression Mother      Anxiety Disorder Mother      Anesthesia Reaction Mother      Asthma Mother      Skin Cancer Mother      Heart Disease Father      Diabetes Father      Coronary Artery Disease Father      Hypertension Father      Hyperlipidemia Father      Cerebrovascular Disease Father         Multiple strokes     Obesity Father      Diabetes Brother      Depression Brother      Hypertension Brother      Cancer Maternal Grandfather         pancreatic cancer/stomach cancer     Prostate Cancer Maternal Grandfather         Prostrate and Bladder     Other Cancer Maternal Grandfather         Pancreatic 1988, Bladder 1977     Cancer Maternal Grandmother          uterine     Breast Cancer Maternal Grandmother      Skin Cancer Maternal Grandmother      Cancer Brother 57        pancreatic cancer     Diabetes Brother      Breast Cancer Cousin         Grand mothers sister     Other Cancer Brother         Stage 3 Pancreatic 2-2015     Depression Brother      Asthma Brother      Obesity Brother      Anesthesia Reaction Other         H/a, itching, drug interactions     Asthma Other      Cancer Brother         Pancreatic      Thyroid Disease No family hx of      Social History     Socioeconomic History     Marital status:      Spouse name: Not on file     Number of children: Not on file     Years of education: Not on file     Highest education level: Not on file   Occupational History     Not on file   Social Needs     Financial resource strain: Not on file     Food insecurity:     Worry: Not on file     Inability: Not on file     Transportation needs:     Medical: Not on file     Non-medical: Not on file   Tobacco Use     Smoking status: Never Smoker     Smokeless tobacco: Never Used     Tobacco comment: no 2nd hand smoke exposure   Substance and Sexual Activity     Alcohol use: No     Comment: rare     Drug use: No     Sexual activity: Not Currently     Partners: Male     Birth control/protection: Surgical     Comment: Tubal Ligation then hysterectomy   Lifestyle     Physical activity:     Days per week: Not on file     Minutes per session: Not on file     Stress: Not on file   Relationships     Social connections:     Talks on phone: Not on file     Gets together: Not on file     Attends Congregation service: Not on file     Active member of club or organization: Not on file     Attends meetings of clubs or organizations: Not on file     Relationship status: Not on file     Intimate partner violence:     Fear of current or ex partner: Not on file     Emotionally abused: Not on file     Physically abused: Not on file     Forced sexual activity: Not on file   Other  Topics Concern     Parent/sibling w/ CABG, MI or angioplasty before 65F 55M? Yes     Comment: Adrian Martinez   Social History Narrative        Stopped working at [employer] in Nov 2018.,  Currently on medical leave.  Afraid she will lose position if she can not return to work in March as planned.      Lives with .  Two adult children.  Granddaughter age 1        REVIEW OF SYSTEMS  =================  C: NEGATIVE for fever, chills, change in weight  I: NEGATIVE for worrisome rashes, moles or lesions  E/M: NEGATIVE for ear, mouth and throat problems  R: NEGATIVE for significant cough or SHORTNESS OF BREATH  CV:  NEGATIVE for chest pain, palpitations or peripheral edema  GI: NEGATIVE for nausea, abdominal pain, heartburn, or change in bowel habits  NEURO: NEGATIVE numbness/weakness  : see HPI  PSYCH: NEGATIVE depression/anxiety  LYmph: no new enlarged lymph nodes  Ortho: no new trauma/movements      Physical Exam:  /87 (BP Location: Left arm, Patient Position: Chair, Cuff Size: Adult Regular)   Pulse 85   Temp 98.6  F (37  C)   SpO2 99%    Patient is pleasant, in no acute distress, good general condition.  Heart:  negative, PMI normal  Lung: no evidence of respiratory distress    Abdomen: Soft, nondistended, non tender. No masses. No rebound or guarding.   Exam: No CVA tenderness  Skin: Warm and dry.  No redness.  Neuro: grossly normal  Musculaskeletal: moving all extremities  Psych normal mood and affect  Musculoskeletal  moving all extremities  Hematologic/Lymphatic/Immunologic: normal ant/post cervical, axillary, supraclavicular and inguinal nodes    Assessment/Plan:   Pleasant 58-year-old  female with intermittent right flank pain most likely due to intermittent obstruction from a right UPJ stone.  Treatment options discussed with patient today.  We discussed observation versus surgical interventions.  Ureteroscopy versus ESWL discussed.  I will schedule for  elective ESWL in the future.  Success rate of 80% discussed.  Information of the procedure provided.    Elevated bp: Elvin to follow up with Primary Care provider regarding elevated blood pressure.

## 2019-07-25 NOTE — PATIENT INSTRUCTIONS
Patient Education     Shock Wave Lithotripsy     Energy waves strike the stone, which begins to crack. The stone crumbles into tiny pieces.     Passing a kidney stone can be very painful. Shock wave lithotripsy is a treatment that helps by breaking the kidney stone into smaller pieces that are easier to pass. This treatment is also called extracorporeal shock wave lithotripsy (ESWL). Lithotripsy takes about an hour. It s done in a hospital, lithotripsy center, or mobile lithotripsy van. You will likely go home the same day. This treatment is not used for all types of kidney stones. Your healthcare provider will discuss whether this is the right treatment for the type of stone you have.   During the procedure    You get medicine to prevent pain and help you relax or sleep during lithotripsy. Once this takes effect, the procedure will start.    A flexible tube (stent) with holes in it may be placed into your ureter, the tube that connects the kidney and the bladder. This helps keep urine flowing from the kidney.    Your healthcare provider then uses X-ray or ultrasound to find the exact location of the kidney stone.    Sound waves are aimed at the stone and sent at high speed. If you re awake, you may feel a tapping as they pass through your body.  After the procedure    You ll be closely watched in a recovery room for about 1 to 3 hours. Antibiotics and pain medicine may be prescribed before you leave.    You ll have a follow-up visit in a few weeks. If you received a stent, it will be removed. Your healthcare provider will also check for pieces of stone. If large pieces remain, you may need a second lithotripsy or another procedure.  Possible risks and complications    Infection    Bleeding in the kidney    Bruising of the kidney or skin    Blockage (obstruction) of the ureter    Failure to break up the stone (other procedures may be needed)   Passing the stone  It can take a day to several weeks for the pieces of  stone to leave your body. Drink plenty of liquids to help flush your system. During this time:    Your urine may be cloudy or slightly bloody. You may even see small pieces of stone.    You may have a slight fever and some pain. Take prescribed or over-the-counter pain medicine as instructed by your healthcare provider.    You may be asked to strain your urine to collect some stone particles. These will be studied in the lab.  When to call your healthcare provider   Call your healthcare provider right away if you have any of the following:    Fever of 100.4 F (38 C) or higher, or as directed by your healthcare provider    Heavy bleeding    Pain that doesn t go away with medicine    Upset stomach and vomiting    Problems urinating   Date Last Reviewed: 1/1/2017 2000-2018 [employer]. [address] [place] [address]. All rights reserved. This information is not intended as a substitute for professional medical care. Always follow your healthcare professional's instructions.           Patient Education     Shock Wave Lithotripsy     Energy waves strike the stone, which begins to crack. The stone crumbles into tiny pieces.     Passing a kidney stone can be very painful. Shock wave lithotripsy is a treatment that helps by breaking the kidney stone into smaller pieces that are easier to pass. This treatment is also called extracorporeal shock wave lithotripsy (ESWL). Lithotripsy takes about an hour. It s done in a hospital, lithotripsy center, or mobile lithotripsy van. You will likely go home the same day. This treatment is not used for all types of kidney stones. Your healthcare provider will discuss whether this is the right treatment for the type of stone you have.   During the procedure    You get medicine to prevent pain and help you relax or sleep during lithotripsy. Once this takes effect, the procedure will start.    A flexible tube (stent) with holes in it may be placed into your  ureter, the tube that connects the kidney and the bladder. This helps keep urine flowing from the kidney.    Your healthcare provider then uses X-ray or ultrasound to find the exact location of the kidney stone.    Sound waves are aimed at the stone and sent at high speed. If you re awake, you may feel a tapping as they pass through your body.  After the procedure    You ll be closely watched in a recovery room for about 1 to 3 hours. Antibiotics and pain medicine may be prescribed before you leave.    You ll have a follow-up visit in a few weeks. If you received a stent, it will be removed. Your healthcare provider will also check for pieces of stone. If large pieces remain, you may need a second lithotripsy or another procedure.  Possible risks and complications    Infection    Bleeding in the kidney    Bruising of the kidney or skin    Blockage (obstruction) of the ureter    Failure to break up the stone (other procedures may be needed)   Passing the stone  It can take a day to several weeks for the pieces of stone to leave your body. Drink plenty of liquids to help flush your system. During this time:    Your urine may be cloudy or slightly bloody. You may even see small pieces of stone.    You may have a slight fever and some pain. Take prescribed or over-the-counter pain medicine as instructed by your healthcare provider.    You may be asked to strain your urine to collect some stone particles. These will be studied in the lab.  When to call your healthcare provider   Call your healthcare provider right away if you have any of the following:    Fever of 100.4 F (38 C) or higher, or as directed by your healthcare provider    Heavy bleeding    Pain that doesn t go away with medicine    Upset stomach and vomiting    Problems urinating   Date Last Reviewed: 1/1/2017 2000-2018 [employer]. [address] [place] [address], [place] [address]. All rights reserved. This information is not intended as a  substitute for professional medical care. Always follow your healthcare professional's instructions.

## 2019-07-25 NOTE — TELEPHONE ENCOUNTER
Type of surgery: Extracorporal sockwave lithotripsy possible stent placement   CPT 48019  Kidney stone [N20.0]  - Primary   Location of surgery: [facility]  Date and time of surgery: 08/19/2019  Surgeon: Gladys   Pre-Op Appt Date: 08/05/2019  Post-Op Appt Date: 09/03/2019   Packet sent out: Yes  Pre-cert/Authorization completed:  Per Availity:  Procedure Code 1  50598    Reference Number  [id number]  Status  NO AUTH REQUIRED    Date: 07/25/2019    Thank you,   Tiny No   [facility] Department  [phone]

## 2019-07-26 NOTE — TELEPHONE ENCOUNTER
Prescription approved per [facility] Refill [facility].  Ginette Thomas RN

## 2019-07-30 DIAGNOSIS — Z85.3 PERSONAL HISTORY OF MALIGNANT NEOPLASM OF BREAST: Primary | ICD-10-CM

## 2019-07-30 PROCEDURE — 81406 MOPATH PROCEDURE LEVEL 7: CPT | Performed by: INTERNAL MEDICINE

## 2019-07-30 PROCEDURE — 81479 UNLISTED MOLECULAR PATHOLOGY: CPT | Performed by: INTERNAL MEDICINE

## 2019-07-30 PROCEDURE — 81321 PTEN GENE FULL SEQUENCE: CPT | Performed by: INTERNAL MEDICINE

## 2019-07-30 PROCEDURE — 81405 MOPATH PROCEDURE LEVEL 6: CPT | Performed by: INTERNAL MEDICINE

## 2019-07-30 PROCEDURE — 81408 MOPATH PROCEDURE LEVEL 9: CPT | Performed by: INTERNAL MEDICINE

## 2019-07-30 PROCEDURE — 81323 PTEN GENE DUP/DELET VARIANT: CPT | Performed by: INTERNAL MEDICINE

## 2019-07-30 PROCEDURE — 81162 BRCA1&2 GEN FULL SEQ DUP/DEL: CPT | Performed by: INTERNAL MEDICINE

## 2019-08-02 NOTE — PROGRESS NOTES
[facility]  [address] [place] [id number]  [phone]  Dept: [phone]    PRE-OP EVALUATION:  Today's date: 2019    iTsha Arias (: 1960) presents for pre-operative evaluation assessment as requested by Dr. Graham.  She requires evaluation and anesthesia risk assessment prior to undergoing surgery/procedure for treatment of kidney stone .    Proposed Surgery/ Procedure: EXTRACORPORAL SHOCKWAVE LITHOTRIPSY POSSIBLE STENT PLACEMENT  Date of Surgery/ Procedure: 2019  Time of Surgery/ Procedure: 1:30pm  Hospital/Surgical Facility: [facility]  Fax number for surgical facility:   Primary Physician: Pablo Soto  Type of Anesthesia Anticipated: to be determined    Patient has a Health Care Directive or Living Will:  NO    1. NO - Do you have a history of heart attack, stroke, stent, bypass or surgery on an artery in the head, neck, heart or legs?  2. NO - Do you ever have any pain or discomfort in your chest?  3. NO - Do you have a history of  Heart Failure?  4. NO - Are you troubled by shortness of breath when: walking on the level, up a slight hill or at night?  5. NO - Do you currently have a cold, bronchitis or other respiratory infection?  6. NO - Do you have a cough, shortness of breath or wheezing?  7. NO - Do you sometimes get pains in the calves of your legs when you walk?  8. YES - DO YOU OR ANYONE IN YOUR FAMILY HAVE PREVIOUS HISTORY OF BLOOD CLOTS?   9. NO - Do you or does anyone in your family have a serious bleeding problem such as prolonged bleeding following surgeries or cuts?  10. YES - HAVE YOU EVER HAD PROBLEMS WITH ANEMIA OR BEEN TOLD TO TAKE IRON PILLS?   11. YES - HAVE YOU HAD ANY ABNORMAL BLOOD LOSS SUCH AS BLACK, TARRY OR BLOODY STOOLS, OR ABNORMAL VAGINAL BLEEDING?   12. YES - HAVE YOU EVER HAD A BLOOD TRANSFUSION?   13. YES - HAVE YOU OR ANY OF YOUR RELATIVES EVER HAD PROBLEMS WITH ANESTHESIA?   14. NO - Do you have  sleep apnea, excessive snoring or daytime drowsiness?  15. NO - Do you have any prosthetic heart valves?  16. NO - Do you have prosthetic joints?  17. NO - Is there any chance that you may be pregnant?      HPI:     HPI related to upcoming procedure: Patient presents for pre op evaluation in anticipation for shockwave lithotripsy with possible stent placement.      History of opiate use with acute withdrawal, has seen several pain specialists and feels as if she was weaned off too quickly.  Has been taking tylenol for pain.  Chest wall pain 2/2 mastectomy and chronic lower back pain 2/2 arthritis.    MEDICAL HISTORY:     Patient Active Problem List    Diagnosis Date Noted     H/O drug withdrawal syndrome 05/24/2019     Priority: Medium     Opioid-induced hyperalgesia 01/17/2019     Priority: Medium     Long term current use of opiate analgesic 12/11/2018     Priority: Medium     Kidney stone 11/26/2018     Priority: Medium     Bilateral lower extremity edema 02/13/2018     Priority: Medium     Diffuse large B cell lymphoma (H) 01/18/2018     Priority: Medium     High grade B-cell lymphoma (H) 09/26/2017     Priority: Medium     DLBCL (diffuse large B cell lymphoma) (H) 09/15/2017     Priority: Medium     Personal history of malignant neoplasm of breast 01/23/2017     Priority: Medium     Status post bariatric surgery 11/02/2016     Priority: Medium     Hypocalcemia 11/02/2016     Priority: Medium     Migraine without status migrainosus, not intractable, unspecified migraine type 10/03/2016     Priority: Medium     Rash 08/07/2016     Priority: Medium     Intertrigo 08/03/2016     Priority: Medium     Chest wall pain 04/20/2016     Priority: Medium     Mild intermittent asthma without complication 12/21/2015     Priority: Medium     Idiopathic mast cell activation syndrome (H) 11/03/2015     Priority: Medium     Chronic pain disorder 11/03/2015     Priority: Medium     Vitamin D deficiency 08/21/2015     Priority:  Medium     Chronic pain, post bilateral mastectomies and breast reconstruction  08/21/2015     Priority: Medium     Patient is followed by Palliative care for ongoing prescription of pain medication.  All refills should be approved by this provider, or covering partner.    Medication(s): morphine BID  Maximum quantity per month: Per palliative care   Clinic visit frequency required:       Controlled substance agreement on file: No    Pain Clinic evaluation in the past: Yes       Location(s):  [facility] Total Score(s):  No flowsheet data found.    Last [facility] website verification:  none   [email or web address]         Compound heterozygous MTHFR mutation [id number] (H) 04/13/2015     Priority: Medium     Metastasis to cervical lymph node (H) 09/26/2014     Priority: Medium     S/P breast reconstruction 05/28/2014     Priority: Medium     S/P thyroidectomy 07/10/2013     Priority: Medium     Postsurgical subclinical hypoparathyroidism (H) 07/10/2013     Priority: Medium     Postsurgical hypothyroidism 07/10/2013     Priority: Medium     Papillary carcinoma, follicular variant (H) 06/28/2013     Priority: Medium     Medication side effects 06/28/2013     Priority: Medium     Baclofen and topamax       Serotonin neurotoxicity 06/20/2013     Priority: Medium     Neuropathic pain of chest 05/30/2013     Priority: Medium     Atrophic vaginitis 05/06/2013     Priority: Medium     History of migraine headaches 05/01/2013     Priority: Medium     Intestinal malabsorption 05/01/2013     Priority: Medium     History of gastric bypass 05/01/2013     Priority: Medium     Lymphedema of breast 03/06/2013     Priority: Medium     Family history of uterine cancer 07/26/2012     Priority: Medium     Infection 03/06/2012     Priority: Medium     Cellulitis 03/06/2012     Priority: Medium     Breast cancer (H) 02/09/2012     Priority: Medium     DCIS, bilateral mastectomies, 1/2012       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     Priority: Medium     CARDIOVASCULAR SCREENING; LDL GOAL LESS THAN 160 2011     Priority: Medium     Seasonal allergic rhinitis      Priority: Medium     Allergic rhinitis due to animal dander      Priority: Medium     Food intolerance      Priority: Medium      Past Medical History:   Diagnosis Date     Allergic rhinitis due to animal dander      Asthma      Breast cancer (H) 12    right     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 No real need for Epipen for this alone.     GDM (gestational diabetes mellitus)     w first pregnancy only     Gestational hypertension      Lymphedema     jackson arms, chest     Migraine      Neuropathy associated with cancer (H)      Papillary carcinoma, follicular variant (H) 2013    pT3, N1, Mx, thyroid     Post-surgical hypothyroidism      Renal disease     kidney stones     Rhinitis, allergic to other allergen      Right Breast mass and microcalcifications 2011     Seasonal allergic conjunctivitis      Seasonal allergic rhinitis 11 skin tests pos. for: dog/M/T/G/W--NEGATIVE FOOD TESTS FOR: shrimp, crab, lobster, coconut     Past Surgical History:   Procedure Laterality Date     BACK SURGERY  ,    Bulging disc w nerve root impigment     BIOPSY BREAST      right     BIOPSY BREAST  11    right, core and sterotactic     BONE MARROW BIOPSY, BONE SPECIMEN, NEEDLE/TROCAR Left 10/3/2017    Procedure: BIOPSY BONE MARROW;  Bone Marrow Biopsy with aspirate;  Surgeon: Amelia Watkins PA-C;  [facility]     BYPASS GASTRIC, CHOLECYSTECTOMY, COMBINED       C LAPAROSCOPIC GASTRIC RESTRICTIVE PX, W/GASTRIC BYPASS/ GAMALIEL-EN-Y, < 150CM      Gamaliel en Y?      SECTION       COLONOSCOPY      normal     COSMETIC SURGERY  2012     Final Stage of Mastectomy     DAVINCI HYSTERECTOMY TOTAL, BILATERAL SALPINGO-OOPHORECTOMY, COMBINED  12/12/2012    Procedure: COMBINED DAVINCI HYSTERECTOMY TOTAL, SALPINGO-OOPHORECTOMY;  Davinci Total Laparoscopic Hysterectomy, Bilateral Salpingo Oophorectomy, Pelvic Washings, Cystoscopy;  Surgeon: Evie Sheikh MD;  Location: [facility] OR     ENT SURGERY  1982     ESOPHAGOSCOPY, GASTROSCOPY, DUODENOSCOPY (EGD), COMBINED N/A 9/14/2017    Procedure: COMBINED ENDOSCOPIC ULTRASOUND, ESOPHAGOSCOPY, GASTROSCOPY, DUODENOSCOPY (EGD), FINE NEEDLE ASPIRATE/BIOPSY;  Esophagogastroduodenoscopy, Endoscopic Ultrasound, with fine needle biopsy aspirate;  Surgeon: Patrick Robles MD;  Location: [facility] OR     GI SURGERY  11-    Rachael-en Y w cholecystectomy     GYN SURGERY  1/6/89,1/10/92    2 c-sections     HYSTERECTOMY, PAP NO LONGER INDICATED  12/12    DeVinci assister lap hyst with BSO     INSERT PORT VASCULAR ACCESS Right 10/4/2017    Procedure: INSERT PORT VASCULAR ACCESS;  Port Placement;  Surgeon: Jc Goodman PA-C;  [facility]     IRRIGATION AND DEBRIDEMENT BREAST  3/1/2012    Procedure:IRRIGATION AND DEBRIDEMENT BREAST; Irrigation and Debridement, Wound Closure Right Breast; Surgeon:JUNG GUILLEN; Location:[facility] OR     MASTECTOMY, RECONSTRUCT BREAST, COMBINED  1/30/2012    Procedure:COMBINED MASTECTOMY, RECONSTRUCT BREAST; Bilateral Mastectomies, Right Axillary [place] Node Biopsy, Bilateral Breast Reconstruction with Tissue Expanders, Reconstruction of inframammary fold, bilateral pain management systems; Surgeon:ANUPAM CAMPBELL; Location:[facility] OR     RECONSTRUCT BREAST  8/31/2012    Procedure: RECONSTRUCT BREAST;  Bilateral 2nd stage breast reconstruction, revision,       SOFT TISSUE SURGERY  1-30-12    Mastectomy w severe myofascial pain syndrome     THYROIDECTOMY  7/10/2013    Procedure: THYROIDECTOMY;  Total Thyroidectomy with central neck dissection;  Surgeon: Indiana Sneed MD;   Location: [facility] OR     TONSILLECTOMY      childhood     Current Outpatient Medications   Medication Sig Dispense Refill     ACAI RAMIREZ PO Take 50 mg by mouth       acetaminophen (TYLENOL) 325 MG tablet Take 2 tablets (650 mg) by mouth every 4 hours as needed for mild pain or fever 100 tablet      albuterol (VENTOLIN HFA) 108 (90 Base) MCG/ACT inhaler Inhale 1-2 puffs into the lungs every 4 hours as needed for shortness of breath / dyspnea or wheezing 18 g 3     aluminum chloride (DRYSOL) 20 % external solution Apply topically At Bedtime 60 mL 3     bisacodyl (DULCOLAX) 5 MG EC tablet Take 3 tablets (15 mg) by mouth daily as needed for constipation 30 tablet 0     calcitRIOL (ROCALTROL) 0.25 MCG capsule 0.5 mcg AM and 0.25 mcg  capsule 3     calcium carbonate antacid (TUMS ULTRA 1000) 1000 MG CHEW Take 1 tablet (1,000 mg) by mouth 4 times daily as needed (severe muscle cramps)       calcium citrate-vitamin D (CALCIUM CITRATE + D3) 315-250 MG-UNIT TABS per tablet Take 2 tablets by mouth 2 times daily 60 tablet 2     celecoxib (CELEBREX) 200 MG capsule Take 1 capsule (200 mg) by mouth 2 times daily 60 capsule 2     cetirizine (ZYRTEC ALLERGY) 10 MG tablet Take 1 tablet (10 mg) by mouth 3 times daily On hold for lab test. 90 tablet 0     Cholecalciferol (VITAMIN D3) 2000 units CAPS Take 2,000 Units by mouth daily 90 capsule 1     colchicine (COLCYRS) 0.6 MG tablet Take 1 tablet (0.6 mg) by mouth 3 times daily 270 tablet 3     COMPOUND CONTAINING CONTROLLED SUBSTANCE (CMPD RX) - PHARMACY TO MIX COMPOUNDED MEDICATION Ketamine 6%, Lidocaine 10% in PLO    Apply small amount to area BID  g 0     cromolyn (OPTICROM) 4 % ophthalmic solution Place 1 drop into both eyes 4 times daily 10 mL 0     cromolyn sodium (NASALCROM) 5.2 MG/ACT AERS Inhaler SPRAY ONE SPRAY( 1 ML) IN NOSTRIL DAILY 1 Bottle 6     [employer] FIBER GUMMY BEARS CHILDREN CHEW Take 5 g by mouth daily (2 gummy= 5 g =1 serving) 120 tablet 3     cyclobenzaprine  (FLEXERIL) 5 MG tablet Take 1 tablet (5 mg) by mouth 3 times daily as needed for muscle spasms 90 tablet 0     diazepam (VALIUM) 5 MG tablet TAKE ONE TABLET ( 5 MG) BY MOUTH THREE TIMES DAILY AS NEEDED FOR MUSCLE SPASMS. FILL 10/08/2018 90 tablet 0     diclofenac (VOLTAREN) 1 % topical gel Apply affected area two times daily as needed using enclosed dosing card. (2-4 grams to chest wall) 100 g 0     EPINEPHrine (EPIPEN 2-CARIDAD) 0.3 MG/0.3ML injection 2-pack Inject 0.3 mLs (0.3 mg) into the muscle once as needed for anaphylaxis 0.6 mL 3     fluticasone (FLONASE) 50 MCG/ACT nasal spray Spray 1-2 sprays into both nostrils daily 16 g 0     furosemide (LASIX) 20 MG tablet Take 1 tablet (20 mg) by mouth 2 times daily 90 tablet 3     KLOR-CON 20 MEQ CR tablet TAKE ONE TABLET BY MOUTH TWICE DAILY  60 tablet 2     levothyroxine (SYNTHROID/LEVOTHROID) 112 MCG tablet ON MONDAY THRU SATURDAY TAKE TWO TABLETS DAILY AND ON SUNDAY TAKE 3 TABLETS DAILY. 189 tablet 3     lidocaine (XYLOCAINE) 5 % ointment Apply quarter size amount to chest and back up to 3 times daily as needed for pain. 350 g 1     lidocaine-prilocaine (EMLA) cream Apply topically as needed for moderate pain 60 g 1     magic mouthwash (FIRST-MOUTHWASH BLM) suspension Swish and spit 5-10 mLs in mouth every 6 hours as needed for mouth sores 237 mL 1     magnesium 200 MG TABS Take 200 mg by mouth 3 times daily       montelukast (SINGULAIR) 10 MG tablet Take 2 tablets (20 mg) by mouth 2 times daily 120 tablet 11     naloxone (NARCAN) nasal spray Spray 1 spray (4 mg) into one nostril alternating nostrils as needed for opioid reversal every 2-3 minutes until assistance arrives 0.2 mL 0     OLANZapine (ZYPREXA) 5 MG tablet Take 1 tablet (5 mg) by mouth At Bedtime 7 tablet 0     olopatadine (PATANOL) 0.1 % ophthalmic solution Apply 1 drop to eye       ondansetron (ZOFRAN-ODT) 8 MG ODT tab Take 1 tablet (8 mg) by mouth every 8 hours as needed 30 tablet 0     order for DME  "Compression socks - knee high 20-30 mmHg zippered if possible 1 Units 11     polyethylene glycol (MIRALAX) powder Take 17 g (1 capful) by mouth daily 510 g 0     Probiotic Product (PROBIOTIC DAILY PO) Take 1 capsule by mouth daily Lacto acid bifidobacterium       prochlorperazine (COMPAZINE) 10 MG tablet Take 10 mg by mouth as needed  3     ranitidine (ZANTAC) 75 MG tablet Take 1 tablet (75 mg) by mouth 3 times daily 90 tablet 0     senna-docusate (SENOKOT-S;PERICOLACE) 8.6-50 MG per tablet Take 1-2 tablets by mouth 2 times daily as needed for constipation 60 tablet 0     SUMAtriptan (IMITREX) 50 MG tablet Take 1 tablet (50 mg) by mouth as needed for migraine 30 tablet 0     tamoxifen (NOLVADEX) 20 MG tablet Take 1 tablet (20 mg) by mouth daily 90 tablet 1     tiZANidine (ZANAFLEX) 4 MG capsule Take 1 capsule (4 mg) by mouth 3 times daily 90 capsule 0     triamcinolone (KENALOG) 0.025 % ointment Apply topically as needed  3     Triamcinolone Acetonide (AZMACORT IN) Inhale 2 puffs into the lungs as needed       UNABLE TO FIND Take 2 capsules by mouth 3 times daily Muscle Mag. 2 caps contain B1 20mg, B2 20mg, B6 10mg, magesium 20mg, manganese 2mg.       UNABLE TO FIND 3 tablets 3 times daily MEDICATION NAME: calcium D-Glucarate   3 caps contain 180mg of elemental calcium.       UNABLE TO FIND 2 tablets 3 times daily MEDICATION NAME: Digestzymes        UNABLE TO FIND 1 tablet daily MEDICATION NAME: Pure Encapsulations       OTC products: none    Allergies   Allergen Reactions     Baclofen Other (See Comments) and Unknown     Other reaction(s): Edema, chest pain, seizures.   Other reaction(s): Chest Pain, Edema, Headache  Seizures     Buprenorphine      Other reaction(s): Chest Pain  Difficulty swallowing, GI cramping     Clonidine Other (See Comments)     \"Seizures\"     Duloxetine Anaphylaxis and Other (See Comments)     Flushing, tremor/muscle twitching and edema  Serotonin syndrome  Other reaction(s): " Ataxia  Flushing, tremor/muscle twitching and edema  Serotonin syndrome  SOB palpitations itching rash     Methocarbamol Other (See Comments)     Swelling and chest pain  Other reaction(s): Angioedema  Swelling in chest     No Clinical Screening - See Comments Shortness Of Breath, Palpitations, Anaphylaxis, Itching, Swelling, Difficulty breathing and Rash     Sukhjinder wipes- oral allergy -  July 2015: throat tightness from a Chinese herbal medicine Wilmer Tran  Other reaction(s): Edema, Tongue Swelling  Sukhjinder wipes  Has anaphylactic reactions to varied environmental things.  Carries an epi-pen  Oral allergy syndrome enviromental- food intolerances and animal dander birch trees potatoes carrots cherries celery apple pears plums peaches parsnip kiwi hazelnuts apricots     Carries epi pen     Nuts Shortness Of Breath     Other reaction(s): Throat Swelling/Closing  Please check herbal formulas for these allergens prior to prescribing.     Serotonin Anxiety, Other (See Comments) and Swelling     Seizures    Anxiety swelling seizures     Suboxone      Severe chest pain, swelling (face, eyes,feet,legs), All over itching, tightness is throat, fatigue, feels anxious, headache     Amitriptyline Other (See Comments)     Other reaction(s): *Unknown     Amitriptyline Hcl Swelling     Birch Trees      Potatoes, carrots, cherries, celery, apple, pears, plums, peaches, parsnip, kiwi, hazelnuts, and apricots,      Blue Dyes Itching     Headaches    headache       Buprenorphine-Naloxone      Other reaction(s): Chest Pain  Difficulty swallowing, GI cramping     Codeine Nausea     Vomiting       Cymbalta Other (See Comments)     Flushing, tremor/muscle twitching and edema     Gabapentin Other (See Comments)     edema  Systemic edema, weaned off from Feb to March per Dr. Dowd.    edema  Other reaction(s): Edema  edema  Systemic edema, weaned off from Feb to March per Dr. Dowd.       Grass      Hydroxyzine      Per patient  "\"Headache, chest pounding/pressure, shivering/tremors, goosebumps, sweating, swelling in feet and legs, agitation, back pain, joint pain, rigidity of muscles in fingers/wrist/feet, vision, pins/needles whole body-emili face and arms, rapid heart beat, nausea/vomiting, painful urination, itching, dry mouth\"     Metaxalone      Other reaction(s): Vomiting     Mugwort [Artemisia Vulgaris]      Various spices     Pollen Extract      Other reaction(s): *Unknown  Mugwort, ragweed's melons bananas cucumbers zucchini     Pregabalin      Ragweeds      Melons, bananas, cucumbers, zucchini.     Topamax      Topiramate      Other reaction(s): Ataxia  seotonin syndrome       Nortriptyline Itching, Visual Disturbance, Swelling, GI Disturbance, Anxiety, Other (See Comments) and Nausea     Other reaction(s): Swelling  Other reaction(s): Edema, Headache  Other reaction(s): Swelling      Latex Allergy: NO    Social History     Tobacco Use     Smoking status: Never Smoker     Smokeless tobacco: Never Used     Tobacco comment: no 2nd hand smoke exposure   Substance Use Topics     Alcohol use: No     Comment: rare     History   Drug Use No       REVIEW OF SYSTEMS:   Constitutional, neuro, ENT, endocrine, pulmonary, cardiac, gastrointestinal, genitourinary, musculoskeletal, integument and psychiatric systems are negative, except as otherwise noted.    EXAM:   BP (!) 154/94   Pulse 112   Temp 98.8  F (37.1  C) (Oral)   Resp 20   Wt 83.5 kg (184 lb)   SpO2 99%   BMI 30.62 kg/m      GENERAL APPEARANCE: healthy, alert and no distress     EYES: EOMI, PERRL     HENT: ear canals and TM's normal and nose and mouth without ulcers or lesions     NECK: no adenopathy, no asymmetry, masses, or scars and thyroid normal to palpation     RESP: lungs clear to auscultation - no rales, rhonchi or wheezes     CV: regular rates and rhythm, normal S1 S2, no S3 or S4 and no murmur, click or rub     ABDOMEN:  soft, nontender, no HSM or masses and bowel " sounds normal     MS: extremities normal- no gross deformities noted, no evidence of inflammation in joints, FROM in all extremities.     SKIN: no suspicious lesions or rashes     NEURO: Normal strength and tone, sensory exam grossly normal, mentation intact and speech normal     PSYCH: mentation appears normal. and affect normal/bright     LYMPHATICS: No cervical adenopathy    DIAGNOSTICS:   No labs or EKG required for low risk surgery (cataract, skin procedure, breast biopsy, etc)    Recent Labs   Lab Test 07/02/19  1637 04/04/19 03/15/19  1850  12/28/17  1224  10/31/17  0540  01/21/13  0936   HGB 13.9  --  14.5   < > 9.9*   < > 8.3*   < > 12.9     --  141*   < > 279   < > 223   < > 280   INR  --   --   --   --  0.99  --  1.03   < >  --      --  140   < > 141   < > 144   < >  --    POTASSIUM 4.0 3.8 3.6   < > 3.7   < > 3.5   < > 4.3   CR 0.77 0.82 0.79   < > 0.96   < > 0.74   < >  --    A1C  --   --   --   --   --   --   --   --  5.5    < > = values in this interval not displayed.        IMPRESSION:   Reason for surgery/procedure: shockwave lithotripsy to treat nephrolithiasis  Diagnosis/reason for consult: pre op evaluation    The proposed surgical procedure is considered LOW risk.    REVISED CARDIAC RISK INDEX  The patient has the following serious cardiovascular risks for perioperative complications such as (MI, PE, VFib and 3  AV Block):  No serious cardiac risks  INTERPRETATION: 0 risks: Class I (very low risk - 0.4% complication rate)    The patient has the following additional risks for perioperative complications:  No identified additional risks      ICD-10-CM    1. Preop general physical exam Z01.818    2. Muscle spasm M62.838 cyclobenzaprine (FLEXERIL) 5 MG tablet   3. Chronic chest wall pain R07.89 PAIN MANAGEMENT REFERRAL    G89.29    4. Chronic pain syndrome G89.4 PAIN MANAGEMENT REFERRAL   5. Nephrolithiasis N20.0      Chronic pain - still with uncontrolled pain per patient.  Lengthy  opiate withdrawal syndrome has all but resolved.  Patient does not want to go back to her previous pain specialist as she states they did not get along.  She requests flexeril refill.  Agreeable to receive referral to see a different pain management specialist.  Discussed that patient will need to see specialist to discuss if disability is warranted as well.  RECOMMENDATIONS:     --Consult hospital rounder / IM to assist post-op medical management    --Patient is to take all scheduled medications on the day of surgery EXCEPT for modifications listed below.    APPROVAL GIVEN to proceed with proposed procedure, without further diagnostic evaluation       Signed Electronically by: Pablo Zarco MD    Copy of this evaluation report is provided to requesting physician.    [facility] Preop Guidelines    Revised Cardiac Risk Index

## 2019-08-05 ENCOUNTER — OFFICE VISIT (OUTPATIENT)
Dept: FAMILY MEDICINE | Facility: CLINIC | Age: 59
End: 2019-08-05
Payer: COMMERCIAL

## 2019-08-05 VITALS
HEART RATE: 112 BPM | RESPIRATION RATE: 20 BRPM | OXYGEN SATURATION: 99 % | DIASTOLIC BLOOD PRESSURE: 94 MMHG | BODY MASS INDEX: 30.62 KG/M2 | WEIGHT: 184 LBS | TEMPERATURE: 98.8 F | SYSTOLIC BLOOD PRESSURE: 154 MMHG

## 2019-08-05 DIAGNOSIS — Z90.89 STATUS POST PARATHYROIDECTOMY: Primary | ICD-10-CM

## 2019-08-05 DIAGNOSIS — G89.4 CHRONIC PAIN SYNDROME: ICD-10-CM

## 2019-08-05 DIAGNOSIS — E55.9 AVITAMINOSIS D: ICD-10-CM

## 2019-08-05 DIAGNOSIS — Z01.818 PREOP GENERAL PHYSICAL EXAM: Primary | ICD-10-CM

## 2019-08-05 DIAGNOSIS — M62.838 MUSCLE SPASM: ICD-10-CM

## 2019-08-05 DIAGNOSIS — M62.838 SPASM OF MUSCLE: ICD-10-CM

## 2019-08-05 DIAGNOSIS — N20.0 NEPHROLITHIASIS: ICD-10-CM

## 2019-08-05 DIAGNOSIS — E89.0 POSTSURGICAL HYPOTHYROIDISM: ICD-10-CM

## 2019-08-05 DIAGNOSIS — G89.29 CHRONIC CHEST WALL PAIN: ICD-10-CM

## 2019-08-05 DIAGNOSIS — Z98.890 STATUS POST PARATHYROIDECTOMY: Primary | ICD-10-CM

## 2019-08-05 DIAGNOSIS — R07.89 CHRONIC CHEST WALL PAIN: ICD-10-CM

## 2019-08-05 DIAGNOSIS — G89.29 OTHER CHRONIC PAIN: ICD-10-CM

## 2019-08-05 DIAGNOSIS — C83.30 RETICULOSARCOMA (H): ICD-10-CM

## 2019-08-05 DIAGNOSIS — E83.51 HYPOCALCEMIA: ICD-10-CM

## 2019-08-05 LAB — COPATH REPORT: NORMAL

## 2019-08-05 PROCEDURE — 99214 OFFICE O/P EST MOD 30 MIN: CPT | Performed by: FAMILY MEDICINE

## 2019-08-05 RX ORDER — CYCLOBENZAPRINE HCL 5 MG
5 TABLET ORAL 3 TIMES DAILY PRN
Qty: 90 TABLET | Refills: 0 | Status: SHIPPED | OUTPATIENT
Start: 2019-08-05 | End: 2019-10-11

## 2019-08-08 DIAGNOSIS — C73 PAPILLARY CARCINOMA, FOLLICULAR VARIANT (H): ICD-10-CM

## 2019-08-08 DIAGNOSIS — Z90.89 STATUS POST PARATHYROIDECTOMY: ICD-10-CM

## 2019-08-08 DIAGNOSIS — G89.29 OTHER CHRONIC PAIN: ICD-10-CM

## 2019-08-08 DIAGNOSIS — E89.0 POSTSURGICAL HYPOTHYROIDISM: ICD-10-CM

## 2019-08-08 DIAGNOSIS — E89.2 POSTSURGICAL HYPOPARATHYROIDISM (H): ICD-10-CM

## 2019-08-08 DIAGNOSIS — E55.9 AVITAMINOSIS D: ICD-10-CM

## 2019-08-08 DIAGNOSIS — E83.51 HYPOCALCEMIA: ICD-10-CM

## 2019-08-08 DIAGNOSIS — Z98.890 STATUS POST PARATHYROIDECTOMY: ICD-10-CM

## 2019-08-08 DIAGNOSIS — M62.838 SPASM OF MUSCLE: ICD-10-CM

## 2019-08-08 DIAGNOSIS — C83.30 RETICULOSARCOMA (H): ICD-10-CM

## 2019-08-08 DIAGNOSIS — F11.20 UNCOMPLICATED OPIOID DEPENDENCE (H): ICD-10-CM

## 2019-08-08 LAB
BASOPHILS # BLD AUTO: 0 10E9/L (ref 0–0.2)
BASOPHILS NFR BLD AUTO: 0.3 %
CA-I SERPL ISE-MCNC: 4.6 MG/DL (ref 4.4–5.2)
CALCIUM SERPL-MCNC: 8.7 MG/DL (ref 8.5–10.1)
CREAT SERPL-MCNC: 0.78 MG/DL (ref 0.52–1.04)
DEPRECATED CALCIDIOL+CALCIFEROL SERPL-MC: 46 UG/L (ref 20–75)
DIFFERENTIAL METHOD BLD: ABNORMAL
EOSINOPHIL # BLD AUTO: 0.1 10E9/L (ref 0–0.7)
EOSINOPHIL NFR BLD AUTO: 2.1 %
ERYTHROCYTE [DISTWIDTH] IN BLOOD BY AUTOMATED COUNT: 11.7 % (ref 10–15)
FERRITIN SERPL-MCNC: 153 NG/ML (ref 8–252)
GFR SERPL CREATININE-BSD FRML MDRD: 84 ML/MIN/{1.73_M2}
HCT VFR BLD AUTO: 40.1 % (ref 35–47)
HGB BLD-MCNC: 13.2 G/DL (ref 11.7–15.7)
LYMPHOCYTES # BLD AUTO: 0.8 10E9/L (ref 0.8–5.3)
LYMPHOCYTES NFR BLD AUTO: 23.4 %
MCH RBC QN AUTO: 32.7 PG (ref 26.5–33)
MCHC RBC AUTO-ENTMCNC: 32.9 G/DL (ref 31.5–36.5)
MCV RBC AUTO: 99 FL (ref 78–100)
MONOCYTES # BLD AUTO: 0.4 10E9/L (ref 0–1.3)
MONOCYTES NFR BLD AUTO: 10.4 %
NEUTROPHILS # BLD AUTO: 2.2 10E9/L (ref 1.6–8.3)
NEUTROPHILS NFR BLD AUTO: 63.8 %
PHOSPHATE SERPL-MCNC: 3.8 MG/DL (ref 2.5–4.5)
PLATELET # BLD AUTO: 179 10E9/L (ref 150–450)
PTH-INTACT SERPL-MCNC: 24 PG/ML (ref 18–80)
RBC # BLD AUTO: 4.04 10E12/L (ref 3.8–5.2)
T3FREE SERPL-MCNC: 3.1 PG/ML (ref 2.3–4.2)
T4 FREE SERPL-MCNC: 1.61 NG/DL (ref 0.76–1.46)
TSH SERPL DL<=0.005 MIU/L-ACNC: <0.01 MU/L (ref 0.4–4)
VIT B12 SERPL-MCNC: 485 PG/ML (ref 193–986)
WBC # BLD AUTO: 3.4 10E9/L (ref 4–11)

## 2019-08-08 PROCEDURE — 82565 ASSAY OF CREATININE: CPT | Performed by: FAMILY MEDICINE

## 2019-08-08 PROCEDURE — 82978 ASSAY OF GLUTATHIONE: CPT | Mod: 90 | Performed by: FAMILY MEDICINE

## 2019-08-08 PROCEDURE — 84481 FREE ASSAY (FT-3): CPT | Performed by: FAMILY MEDICINE

## 2019-08-08 PROCEDURE — 84439 ASSAY OF FREE THYROXINE: CPT | Performed by: FAMILY MEDICINE

## 2019-08-08 PROCEDURE — 83090 ASSAY OF HOMOCYSTEINE: CPT | Performed by: FAMILY MEDICINE

## 2019-08-08 PROCEDURE — 82310 ASSAY OF CALCIUM: CPT | Performed by: FAMILY MEDICINE

## 2019-08-08 PROCEDURE — 84443 ASSAY THYROID STIM HORMONE: CPT | Performed by: FAMILY MEDICINE

## 2019-08-08 PROCEDURE — 99000 SPECIMEN HANDLING OFFICE-LAB: CPT | Performed by: FAMILY MEDICINE

## 2019-08-08 PROCEDURE — 83921 ORGANIC ACID SINGLE QUANT: CPT | Performed by: FAMILY MEDICINE

## 2019-08-08 PROCEDURE — 82330 ASSAY OF CALCIUM: CPT | Performed by: FAMILY MEDICINE

## 2019-08-08 PROCEDURE — 84432 ASSAY OF THYROGLOBULIN: CPT | Mod: 90 | Performed by: FAMILY MEDICINE

## 2019-08-08 PROCEDURE — 36415 COLL VENOUS BLD VENIPUNCTURE: CPT | Performed by: FAMILY MEDICINE

## 2019-08-08 PROCEDURE — 85025 COMPLETE CBC W/AUTO DIFF WBC: CPT | Performed by: FAMILY MEDICINE

## 2019-08-08 PROCEDURE — 83970 ASSAY OF PARATHORMONE: CPT | Performed by: FAMILY MEDICINE

## 2019-08-08 PROCEDURE — 82728 ASSAY OF FERRITIN: CPT | Performed by: FAMILY MEDICINE

## 2019-08-08 PROCEDURE — 86800 THYROGLOBULIN ANTIBODY: CPT | Mod: 90 | Performed by: FAMILY MEDICINE

## 2019-08-08 PROCEDURE — 00000344 ZZHCL STATISTIC REMEASURE THYROGLOBULIN: Mod: 90 | Performed by: FAMILY MEDICINE

## 2019-08-08 PROCEDURE — 82306 VITAMIN D 25 HYDROXY: CPT | Performed by: FAMILY MEDICINE

## 2019-08-08 PROCEDURE — 86800 THYROGLOBULIN ANTIBODY: CPT | Mod: 59 | Performed by: FAMILY MEDICINE

## 2019-08-08 PROCEDURE — 84100 ASSAY OF PHOSPHORUS: CPT | Performed by: FAMILY MEDICINE

## 2019-08-08 PROCEDURE — 82607 VITAMIN B-12: CPT | Performed by: FAMILY MEDICINE

## 2019-08-09 LAB — THYROGLOB AB SERPL IA-ACNC: <20 IU/ML (ref 0–40)

## 2019-08-12 LAB
RESULT: NORMAL
SEND OUTS MISC TEST CODE: NORMAL
SEND OUTS MISC TEST SPECIMEN: NORMAL
TEST NAME: NORMAL

## 2019-08-13 LAB — LAB SCANNED RESULT: NORMAL

## 2019-08-14 LAB — HCYS SERPL-SCNC: 6.5 UMOL/L (ref 4–12)

## 2019-08-15 ENCOUNTER — ANESTHESIA EVENT (OUTPATIENT)
Dept: SURGERY | Facility: AMBULATORY SURGERY CENTER | Age: 59
End: 2019-08-15

## 2019-08-15 LAB — METHYLMALONATE SERPL-SCNC: 0.23 UMOL/L (ref 0–0.4)

## 2019-08-16 ENCOUNTER — TELEPHONE (OUTPATIENT)
Dept: UROLOGY | Facility: CLINIC | Age: 59
End: 2019-08-16

## 2019-08-16 LAB — CRP SERPL-MCNC: NORMAL MG/L (ref 0–8)

## 2019-08-16 NOTE — TELEPHONE ENCOUNTER
Beth Hernandez at [facility], he has been working with patient, she no longer has insurance coverage and surgery will be cancelled.

## 2019-08-19 ENCOUNTER — ANESTHESIA (OUTPATIENT)
Dept: SURGERY | Facility: AMBULATORY SURGERY CENTER | Age: 59
End: 2019-08-19
Payer: COMMERCIAL

## 2019-08-19 ENCOUNTER — HOSPITAL ENCOUNTER (OUTPATIENT)
Facility: AMBULATORY SURGERY CENTER | Age: 59
Discharge: HOME | End: 2019-08-19
Attending: UROLOGY | Admitting: UROLOGY
Payer: COMMERCIAL

## 2019-08-19 VITALS
RESPIRATION RATE: 20 BRPM | SYSTOLIC BLOOD PRESSURE: 146 MMHG | OXYGEN SATURATION: 99 % | DIASTOLIC BLOOD PRESSURE: 93 MMHG | TEMPERATURE: 96.6 F

## 2019-08-19 DIAGNOSIS — N20.0 KIDNEY STONE: Primary | ICD-10-CM

## 2019-08-19 PROCEDURE — G8916 PT W IV AB GIVEN ON TIME: HCPCS

## 2019-08-19 PROCEDURE — G8907 PT DOC NO EVENTS ON DISCHARG: HCPCS

## 2019-08-19 PROCEDURE — 50590 FRAGMENTING OF KIDNEY STONE: CPT | Mod: RT

## 2019-08-19 PROCEDURE — 50590 FRAGMENTING OF KIDNEY STONE: CPT | Mod: RT | Performed by: UROLOGY

## 2019-08-19 PROCEDURE — 52332 CYSTOSCOPY AND TREATMENT: CPT | Mod: RT

## 2019-08-19 DEVICE — IMPLANTABLE DEVICE: Type: IMPLANTABLE DEVICE | Site: URETER | Status: FUNCTIONAL

## 2019-08-19 RX ORDER — KETOROLAC TROMETHAMINE 30 MG/ML
30 INJECTION, SOLUTION INTRAMUSCULAR; INTRAVENOUS EVERY 6 HOURS PRN
Status: DISCONTINUED | OUTPATIENT
Start: 2019-08-19 | End: 2019-08-20 | Stop reason: HOSPADM

## 2019-08-19 RX ORDER — MEPERIDINE HYDROCHLORIDE 25 MG/ML
12.5 INJECTION INTRAMUSCULAR; INTRAVENOUS; SUBCUTANEOUS
Status: DISCONTINUED | OUTPATIENT
Start: 2019-08-19 | End: 2019-08-20 | Stop reason: HOSPADM

## 2019-08-19 RX ORDER — LIDOCAINE 40 MG/G
CREAM TOPICAL
Status: DISCONTINUED | OUTPATIENT
Start: 2019-08-19 | End: 2019-08-20 | Stop reason: HOSPADM

## 2019-08-19 RX ORDER — FENTANYL CITRATE 50 UG/ML
25-50 INJECTION, SOLUTION INTRAMUSCULAR; INTRAVENOUS
Status: DISCONTINUED | OUTPATIENT
Start: 2019-08-19 | End: 2019-08-20 | Stop reason: HOSPADM

## 2019-08-19 RX ORDER — ONDANSETRON 4 MG/1
4 TABLET, ORALLY DISINTEGRATING ORAL EVERY 30 MIN PRN
Status: DISCONTINUED | OUTPATIENT
Start: 2019-08-19 | End: 2019-08-20 | Stop reason: HOSPADM

## 2019-08-19 RX ORDER — LIDOCAINE HYDROCHLORIDE 20 MG/ML
INJECTION, SOLUTION INFILTRATION; PERINEURAL PRN
Status: DISCONTINUED | OUTPATIENT
Start: 2019-08-19 | End: 2019-08-19

## 2019-08-19 RX ORDER — CEFAZOLIN SODIUM 2 G/100ML
2 INJECTION, SOLUTION INTRAVENOUS
Status: COMPLETED | OUTPATIENT
Start: 2019-08-19 | End: 2019-08-19

## 2019-08-19 RX ORDER — PROPOFOL 10 MG/ML
INJECTION, EMULSION INTRAVENOUS PRN
Status: DISCONTINUED | OUTPATIENT
Start: 2019-08-19 | End: 2019-08-19

## 2019-08-19 RX ORDER — PROPOFOL 10 MG/ML
INJECTION, EMULSION INTRAVENOUS CONTINUOUS PRN
Status: DISCONTINUED | OUTPATIENT
Start: 2019-08-19 | End: 2019-08-19

## 2019-08-19 RX ORDER — HEPARIN SODIUM (PORCINE) LOCK FLUSH IV SOLN 100 UNIT/ML 100 UNIT/ML
5 SOLUTION INTRAVENOUS
Status: DISCONTINUED | OUTPATIENT
Start: 2019-08-19 | End: 2019-08-20 | Stop reason: HOSPADM

## 2019-08-19 RX ORDER — NALOXONE HYDROCHLORIDE 0.4 MG/ML
.1-.4 INJECTION, SOLUTION INTRAMUSCULAR; INTRAVENOUS; SUBCUTANEOUS
Status: DISCONTINUED | OUTPATIENT
Start: 2019-08-19 | End: 2019-08-20 | Stop reason: HOSPADM

## 2019-08-19 RX ORDER — ALBUTEROL SULFATE 0.83 MG/ML
2.5 SOLUTION RESPIRATORY (INHALATION) EVERY 4 HOURS PRN
Status: DISCONTINUED | OUTPATIENT
Start: 2019-08-19 | End: 2019-08-20 | Stop reason: HOSPADM

## 2019-08-19 RX ORDER — CEPHALEXIN 500 MG/1
500 CAPSULE ORAL 3 TIMES DAILY
Qty: 9 CAPSULE | Refills: 0 | Status: SHIPPED | OUTPATIENT
Start: 2019-08-19 | End: 2020-04-06

## 2019-08-19 RX ORDER — FENTANYL CITRATE 50 UG/ML
INJECTION, SOLUTION INTRAMUSCULAR; INTRAVENOUS PRN
Status: DISCONTINUED | OUTPATIENT
Start: 2019-08-19 | End: 2019-08-19

## 2019-08-19 RX ORDER — ACETAMINOPHEN 325 MG/1
975 TABLET ORAL ONCE
Status: COMPLETED | OUTPATIENT
Start: 2019-08-19 | End: 2019-08-19

## 2019-08-19 RX ORDER — SODIUM CHLORIDE, SODIUM LACTATE, POTASSIUM CHLORIDE, CALCIUM CHLORIDE 600; 310; 30; 20 MG/100ML; MG/100ML; MG/100ML; MG/100ML
INJECTION, SOLUTION INTRAVENOUS CONTINUOUS
Status: DISCONTINUED | OUTPATIENT
Start: 2019-08-19 | End: 2019-08-20 | Stop reason: HOSPADM

## 2019-08-19 RX ORDER — HEPARIN SODIUM,PORCINE 10 UNIT/ML
5-10 VIAL (ML) INTRAVENOUS
Status: DISCONTINUED | OUTPATIENT
Start: 2019-08-19 | End: 2019-08-20 | Stop reason: HOSPADM

## 2019-08-19 RX ORDER — HYDROMORPHONE HYDROCHLORIDE 1 MG/ML
.3-.5 INJECTION, SOLUTION INTRAMUSCULAR; INTRAVENOUS; SUBCUTANEOUS EVERY 10 MIN PRN
Status: DISCONTINUED | OUTPATIENT
Start: 2019-08-19 | End: 2019-08-20 | Stop reason: HOSPADM

## 2019-08-19 RX ORDER — HEPARIN SODIUM,PORCINE 10 UNIT/ML
5-10 VIAL (ML) INTRAVENOUS EVERY 24 HOURS
Status: DISCONTINUED | OUTPATIENT
Start: 2019-08-19 | End: 2019-08-20 | Stop reason: HOSPADM

## 2019-08-19 RX ORDER — DEXAMETHASONE SODIUM PHOSPHATE 4 MG/ML
4 INJECTION, SOLUTION INTRA-ARTICULAR; INTRALESIONAL; INTRAMUSCULAR; INTRAVENOUS; SOFT TISSUE EVERY 10 MIN PRN
Status: DISCONTINUED | OUTPATIENT
Start: 2019-08-19 | End: 2019-08-20 | Stop reason: HOSPADM

## 2019-08-19 RX ORDER — OXYCODONE HYDROCHLORIDE 5 MG/1
5-10 TABLET ORAL EVERY 4 HOURS PRN
Status: DISCONTINUED | OUTPATIENT
Start: 2019-08-19 | End: 2019-08-20 | Stop reason: HOSPADM

## 2019-08-19 RX ORDER — CEFAZOLIN SODIUM 1 G/3ML
1 INJECTION, POWDER, FOR SOLUTION INTRAMUSCULAR; INTRAVENOUS SEE ADMIN INSTRUCTIONS
Status: DISCONTINUED | OUTPATIENT
Start: 2019-08-19 | End: 2019-08-20 | Stop reason: HOSPADM

## 2019-08-19 RX ORDER — TRAMADOL HYDROCHLORIDE 50 MG/1
50 TABLET ORAL EVERY 6 HOURS PRN
Qty: 30 TABLET | Refills: 0 | Status: SHIPPED | OUTPATIENT
Start: 2019-08-19 | End: 2021-08-11

## 2019-08-19 RX ORDER — ONDANSETRON 2 MG/ML
4 INJECTION INTRAMUSCULAR; INTRAVENOUS EVERY 30 MIN PRN
Status: DISCONTINUED | OUTPATIENT
Start: 2019-08-19 | End: 2019-08-20 | Stop reason: HOSPADM

## 2019-08-19 RX ORDER — DEXAMETHASONE SODIUM PHOSPHATE 4 MG/ML
INJECTION, SOLUTION INTRA-ARTICULAR; INTRALESIONAL; INTRAMUSCULAR; INTRAVENOUS; SOFT TISSUE PRN
Status: DISCONTINUED | OUTPATIENT
Start: 2019-08-19 | End: 2019-08-19

## 2019-08-19 RX ADMIN — PROPOFOL 150 MG: 10 INJECTION, EMULSION INTRAVENOUS at 15:15

## 2019-08-19 RX ADMIN — HEPARIN SODIUM (PORCINE) LOCK FLUSH IV SOLN 100 UNIT/ML 5 ML: 100 SOLUTION at 17:23

## 2019-08-19 RX ADMIN — ACETAMINOPHEN 975 MG: 325 TABLET ORAL at 12:52

## 2019-08-19 RX ADMIN — PROPOFOL 200 MCG/KG/MIN: 10 INJECTION, EMULSION INTRAVENOUS at 15:18

## 2019-08-19 RX ADMIN — DEXAMETHASONE SODIUM PHOSPHATE 4 MG: 4 INJECTION, SOLUTION INTRA-ARTICULAR; INTRALESIONAL; INTRAMUSCULAR; INTRAVENOUS; SOFT TISSUE at 15:20

## 2019-08-19 RX ADMIN — SODIUM CHLORIDE, SODIUM LACTATE, POTASSIUM CHLORIDE, CALCIUM CHLORIDE: 600; 310; 30; 20 INJECTION, SOLUTION INTRAVENOUS at 13:14

## 2019-08-19 RX ADMIN — LIDOCAINE HYDROCHLORIDE 3 ML: 20 INJECTION, SOLUTION INFILTRATION; PERINEURAL at 15:15

## 2019-08-19 RX ADMIN — FENTANYL CITRATE 50 MCG: 50 INJECTION, SOLUTION INTRAMUSCULAR; INTRAVENOUS at 16:42

## 2019-08-19 RX ADMIN — SODIUM CHLORIDE, SODIUM LACTATE, POTASSIUM CHLORIDE, CALCIUM CHLORIDE: 600; 310; 30; 20 INJECTION, SOLUTION INTRAVENOUS at 16:53

## 2019-08-19 RX ADMIN — ONDANSETRON 4 MG: 2 INJECTION INTRAMUSCULAR; INTRAVENOUS at 15:52

## 2019-08-19 RX ADMIN — OXYCODONE HYDROCHLORIDE 10 MG: 5 TABLET ORAL at 17:02

## 2019-08-19 RX ADMIN — FENTANYL CITRATE 50 MCG: 50 INJECTION, SOLUTION INTRAMUSCULAR; INTRAVENOUS at 15:15

## 2019-08-19 RX ADMIN — CEFAZOLIN SODIUM 2 G: 2 INJECTION, SOLUTION INTRAVENOUS at 15:22

## 2019-08-19 ASSESSMENT — COPD QUESTIONNAIRES: COPD: 0

## 2019-08-19 ASSESSMENT — LIFESTYLE VARIABLES: TOBACCO_USE: 0

## 2019-08-19 NOTE — ANESTHESIA POSTPROCEDURE EVALUATION
Anesthesia POST Procedure Evaluation    Patient: Tisha Arias   MRN:     [id number] Gender:   female   Age:    58 year old :      1960        Preoperative Diagnosis: Right Renal Stone   Procedure(s):  EXTRACORPORAL SHOCKWAVE LITHOTRIPSY,RIGHT URETERAL STENT PLACEMENT   Postop Comments: No value filed.       Anesthesia Type:  Not documented  General    Reportable Event: NO     PAIN: Uncomplicated   Sign Out status: Comfortable, Well controlled pain     PONV: No PONV   Sign Out status:  No Nausea or Vomiting     Neuro/Psych: Uneventful perioperative course   Sign Out Status: Preoperative baseline; Age appropriate mentation     Airway/Resp.: Uneventful perioperative course   Sign Out Status: Non labored breathing, age appropriate RR; Resp. Status within EXPECTED Parameters     CV: Uneventful perioperative course   Sign Out status: Appropriate BP and perfusion indices; Appropriate HR/Rhythm     Disposition:   Sign Out in:  PACU  Disposition:  Phase II; Home  Recovery Course: Uneventful  Follow-Up: Not required     Comments/Narrative:  Patient doing well post-operatively.  No significant issues.  Hemodynamically stable, pain well controlled, nausea well controlled.  Stable for discharge from the PACU             Last Anesthesia Record Vitals:  CRNA VITALS  2019 1612 - 2019 1712      2019             Resp Rate (observed):  4  (Abnormal)           Last PACU Vitals:  Vitals Value Taken Time   /86 2019  4:55 PM   Temp 35.9  C (96.6  F) 2019  4:44 PM   Pulse 86 2019  4:50 PM   Resp 20 2019  4:55 PM   SpO2 97 % 2019  4:55 PM   Temp src     NIBP     Pulse     SpO2     Resp     Temp     Ht Rate     Temp 2     Vitals shown include unvalidated device data.      Electronically Signed By: Jd Riley MD, 2019, 5:24 PM

## 2019-08-19 NOTE — ANESTHESIA PREPROCEDURE EVALUATION
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 No real need for Epipen for this alone.     GDM (gestational diabetes mellitus)     w first pregnancy only     Gestational hypertension      Lymphedema     jackson arms, chest     Migraine      Neuropathy associated with cancer (H)      Papillary carcinoma, follicular variant (H) 2013    pT3, N1, Mx, thyroid     Post-surgical hypothyroidism      Renal disease     kidney stones     Rhinitis, allergic to other allergen      Right Breast mass and microcalcifications 2011     Seasonal allergic conjunctivitis      Seasonal allergic rhinitis 11 skin tests pos. for: dog/M/T/G/W--NEGATIVE FOOD TESTS FOR: shrimp, crab, lobster, coconut      Past Surgical History:   Procedure Laterality Date     BACK SURGERY  ,    Bulging disc w nerve root impigment     BIOPSY BREAST      right     BIOPSY BREAST  11    right, core and sterotactic     BONE MARROW BIOPSY, BONE SPECIMEN, NEEDLE/TROCAR Left 10/3/2017    Procedure: BIOPSY BONE MARROW;  Bone Marrow Biopsy with aspirate;  Surgeon: Amelia Watkins PA-C;  [facility]     BYPASS GASTRIC, CHOLECYSTECTOMY, COMBINED       C LAPAROSCOPIC GASTRIC RESTRICTIVE PX, W/GASTRIC BYPASS/ GAMALIEL-EN-Y, < 150CM      Gamaliel en Y?      SECTION       COLONOSCOPY      normal     COSMETIC SURGERY  2012    Final Stage  of Mastectomy     DAVINCI HYSTERECTOMY TOTAL, BILATERAL SALPINGO-OOPHORECTOMY, COMBINED  12/12/2012    Procedure: COMBINED DAVINCI HYSTERECTOMY TOTAL, SALPINGO-OOPHORECTOMY;  Davinci Total Laparoscopic Hysterectomy, Bilateral Salpingo Oophorectomy, Pelvic Washings, Cystoscopy;  Surgeon: Evie Sheikh MD;  [facility]     ENT SURGERY  1982     ESOPHAGOSCOPY, GASTROSCOPY, DUODENOSCOPY (EGD), COMBINED N/A 9/14/2017    Procedure: COMBINED ENDOSCOPIC ULTRASOUND, ESOPHAGOSCOPY, GASTROSCOPY, DUODENOSCOPY (EGD), FINE NEEDLE ASPIRATE/BIOPSY;  Esophagogastroduodenoscopy, Endoscopic Ultrasound, with fine needle biopsy aspirate;  Surgeon: Patrick Robles MD;  Location:  OR     GI SURGERY  11-    Rachael-en Y w cholecystectomy     GYN SURGERY  1/6/89,1/10/92    2 c-sections     HYSTERECTOMY, PAP NO LONGER INDICATED  12/12    DeVinci assister lap hyst with BSO     INSERT PORT VASCULAR ACCESS Right 10/4/2017    Procedure: INSERT PORT VASCULAR ACCESS;  Port Placement;  Surgeon: Jc Goodman PA-C;  [facility]     IRRIGATION AND DEBRIDEMENT BREAST  3/1/2012    Procedure:IRRIGATION AND DEBRIDEMENT BREAST; Irrigation and Debridement, Wound Closure Right Breast; Surgeon:JUNG GUILLEN; Location:[facility] OR     MASTECTOMY, RECONSTRUCT BREAST, COMBINED  1/30/2012    Procedure:COMBINED MASTECTOMY, RECONSTRUCT BREAST; Bilateral Mastectomies, Right Axillary [place] Node Biopsy, Bilateral Breast Reconstruction with Tissue Expanders, Reconstruction of inframammary fold, bilateral pain management systems; Surgeon:ANUPAM CAMPBELL; Location: OR     RECONSTRUCT BREAST  8/31/2012    Procedure: RECONSTRUCT BREAST;  Bilateral 2nd stage breast reconstruction, revision,       SOFT TISSUE SURGERY  1-30-12    Mastectomy w severe myofascial pain syndrome     THYROIDECTOMY  7/10/2013    Procedure: THYROIDECTOMY;  Total Thyroidectomy with central neck dissection;  Surgeon: Indiana Sneed MD;  Location:  OR      TONSILLECTOMY      childhood          Anesthesia Evaluation     . Pt has had prior anesthetic. Type: General    No history of anesthetic complications          ROS/MED HX    ENT/Pulmonary:     (+)Intermittent asthma , . .   (-) tobacco use and COPD   Neurologic:     (+)neuropathy    (-) CVA and TIA   Cardiovascular:        (-) hypertension, CAD, irregular heartbeat/palpitations and stent   METS/Exercise Tolerance:     Hematologic:        (-) anemia   Musculoskeletal:         GI/Hepatic:        (-) GERD and liver disease   Renal/Genitourinary:      (-) renal disease   Endo:     (+) thyroid problem .   (-) Type I DM and Type II DM   Psychiatric:         Infectious Disease:  - neg infectious disease ROS       Malignancy:   (+) Malignancy           Other:    (+) H/O Chronic Pain,                       PHYSICAL EXAM:   Mental Status/Neuro: A/A/O   Airway: Facies: Feasible  Mallampati: II  Mouth/Opening: Full  TM distance: > 6 cm  Neck ROM: Full   Respiratory: Auscultation: CTAB     Resp. Rate: Normal     Resp. Effort: Normal      CV: Rhythm: Regular  Rate: Age appropriate  Heart: Normal Sounds  Edema: None   Comments:      Dental: Normal Dentition                LABS:  CBC:   Lab Results   Component Value Date    WBC 3.4 (L) 08/08/2019    WBC 4.3 07/02/2019    HGB 13.2 08/08/2019    HGB 13.9 07/02/2019    HCT 40.1 08/08/2019    HCT 41.6 07/02/2019     08/08/2019     07/02/2019     BMP:   Lab Results   Component Value Date     07/02/2019     03/15/2019    POTASSIUM 4.0 07/02/2019    POTASSIUM 3.8 04/04/2019    CHLORIDE 104 07/02/2019    CHLORIDE 106 03/15/2019    CO2 30 07/02/2019    CO2 26 03/15/2019    BUN 14 07/02/2019    BUN 16 03/15/2019    CR 0.78 08/08/2019    CR 0.77 07/02/2019    GLC 91 07/02/2019     (A) 04/04/2019     COAGS:   Lab Results   Component Value Date    PTT 26 09/01/2017    INR 0.99 12/28/2017     POC:   Lab Results   Component Value Date     (H) 02/27/2018  "    OTHER:   Lab Results   Component Value Date    LACT 2.3 (H) 11/18/2017    A1C 5.5 01/21/2013    ELMO 8.7 08/08/2019    PHOS 3.8 08/08/2019    MAG 2.0 08/31/2018    ALBUMIN 3.7 07/02/2019    PROTTOTAL 7.1 07/02/2019    ALT 23 07/02/2019    AST 21 07/02/2019    ALKPHOS 89 07/02/2019    BILITOTAL 0.2 07/02/2019    LIPASE 312 (H) 05/28/2013    TSH <0.01 (L) 08/08/2019    T4 1.61 (H) 08/08/2019    CRP Canceled, Test credited 08/08/2019    SED 7 04/03/2013        Preop Vitals    BP Readings from Last 3 Encounters:   08/05/19 (!) 154/94   07/25/19 147/87   07/02/19 124/84    Pulse Readings from Last 3 Encounters:   08/05/19 112   07/25/19 85   07/02/19 102      Resp Readings from Last 3 Encounters:   08/05/19 20   07/02/19 18   07/01/19 14    SpO2 Readings from Last 3 Encounters:   08/05/19 99%   07/25/19 99%   07/02/19 98%      Temp Readings from Last 1 Encounters:   08/05/19 37.1  C (98.8  F) (Oral)    Ht Readings from Last 1 Encounters:   07/01/19 1.651 m (5' 5\")      Wt Readings from Last 1 Encounters:   08/05/19 83.5 kg (184 lb)    Estimated body mass index is 30.62 kg/m  as calculated from the following:    Height as of 7/1/19: 1.651 m (5' 5\").    Weight as of 8/5/19: 83.5 kg (184 lb).     LDA:  Peripheral IV 03/15/19 Right Upper forearm (Active)   Number of days: 157       Peripheral IV (Active)   Number of days:        Port A Cath Single Right Chest wall (Active)   Number of days:         Assessment:   ASA SCORE: 3    H&P: History and physical reviewed and following examination; no interval change.    NPO Status: NPO Appropriate     Plan:   Anes. Type:  General   Pre-Medication: None   Induction:  IV (Standard)   Airway: ETT; Oral   Access/Monitoring: PIV   Maintenance: Balanced     Postop Plan:   Postop Pain: Opioids  Postop Sedation/Airway: Not planned     PONV Management:   Adult Risk Factors: Female, Postop Opioids   Prevention: Ondansetron, Dexamethasone     CONSENT: Direct conversation   Plan and risks " discussed with: Patient   Blood Products: Consent Deferred (Minimal Blood Loss)                   Jd Riley MD

## 2019-08-19 NOTE — OR NURSING
P:  Discharge A: patient anxious to go home. Came back from bathroom, got dressed and wanted to leave.  Did not want a wheel chair ride out, but informed it was policy.  I:  Discharge information and arrangements included: instructions given and belongings returned.    R: Patient and  expressed understanding.

## 2019-08-19 NOTE — PROGRESS NOTES
Surgery will be cancelled today.  She can get this done at [facility] with a 47% discount.  This would save her a lot of money.

## 2019-08-19 NOTE — OP NOTE
PREOPERATIVE DIAGNOSIS: right ureteral  stone.   POSTOPERATIVE DIAGNOSIS: same.   PROCEDURE PERFORMED: cystoscopy and right stent placement with Extracorporeal shockwave lithotripsy.   DESCRIPTION OF PROCEDURE: The patient was brought to the operating room. After general anesthesia was induced, she was placed in frog legged position.  She was draped and prepped in the usual surgical fashion.  Preop antibiotics given.  Using a rigid cystoscope this was placed into the bladder.  The right ureteral orifice identified.  A sensor wire was then placed into the renal pelvis under fluoroscopy.  A 6 [place] by 26 cm ureteral stent was then placed. Under fluoroscopy, the  stone was identified. A total of 4000 shocks was given to the stone. There were some changes under fluoroscopy to the stone. The patient tolerated the procedure well. No complications were identified during the procedure

## 2019-08-19 NOTE — DISCHARGE INSTRUCTIONS
[facility]  Same-Day Surgery   Adult Discharge Orders & Instructions   For 24 hours after surgery  1. Get plenty of rest.  A responsible adult must stay with you for at least 24 hours after you leave the hospital.   2. Do not drive or use heavy equipment.  If you have weakness or tingling, don't drive or use heavy equipment until this feeling goes away.  3. Do not drink alcohol.  4. Avoid strenuous or risky activities.  Ask for help when climbing stairs.   5. You may feel lightheaded.  IF so, sit for a few minutes before standing.  Have someone help you get up.   6. If you have nausea (feel sick to your stomach): Drink only clear liquids such as apple juice, ginger ale, broth or 7-Up.  Rest may also help.  Be sure to drink enough fluids.  Move to a regular diet as you feel able.  7. You may have a slight fever. Call the doctor if your fever is over 100 F (37.7 C) (taken under the tongue) or lasts longer than 24 hours.  8. You may have a dry mouth, a sore throat, muscle aches or trouble sleeping.  These should go away after 24 hours.  9. Do not make important or legal decisions.   Call your doctor for any of the followin.  Signs of infection (fever, growing tenderness at the surgery site, a large amount of drainage or bleeding, severe pain, foul-smelling drainage, redness, swelling).    2. It has been over 8 to 10 hours since surgery and you are still not able to urinate (pass water).    3.  Headache for over 24 hours.    To contact Dr Graham call:  [phone]    Tylenol was given at 12:50 pm.

## 2019-08-19 NOTE — ANESTHESIA CARE TRANSFER NOTE
Patient: Tisha Arias    Procedure(s):  EXTRACORPORAL SHOCKWAVE LITHOTRIPSY,RIGHT URETERAL STENT PLACEMENT    Diagnosis: Right Renal Stone  Diagnosis Additional Information: No value filed.    Anesthesia Type:   General     Note:  Airway :Nasal Cannula  Patient transferred to:PACU  Comments: Awake, comfortable, sats 100%< Report to RN.Handoff Report: Identifed the Patient, Identified the Reponsible Provider, Reviewed the pertinent medical history, Discussed the surgical course, Reviewed Intra-OP anesthesia mangement and issues during anesthesia, Set expectations for post-procedure period and Allowed opportunity for questions and acknowledgement of understanding      Vitals: (Last set prior to Anesthesia Care Transfer)    CRNA VITALS  8/19/2019 1612 - 8/19/2019 1650      8/19/2019             Resp Rate (observed):  4  (Abnormal)                 Electronically Signed By: DENZEL Gonzalez CRNA  August 19, 2019  4:50 PM

## 2019-08-19 NOTE — BRIEF OP NOTE
[facility]  Brief Operative Note    Pre-operative diagnosis: Right ureteral stone   Post-operative diagnosis same   Procedure: Procedure(s):  EXTRACORPORAL SHOCKWAVE LITHOTRIPSY,RIGHT URETERAL STENT PLACEMENT   Surgeon: Pablo Graham MD   Assistants(s): None   Anesthesia: General    Estimated blood loss: minimal                   Specimens: None   Implants: stent       Complications: None   Condition on discharge: Stable   Findings: Stone seemed broken  See dictated operative report for full details

## 2019-08-20 ENCOUNTER — MEDICAL CORRESPONDENCE (OUTPATIENT)
Dept: HEALTH INFORMATION MANAGEMENT | Facility: CLINIC | Age: 59
End: 2019-08-20

## 2019-08-21 DIAGNOSIS — E55.9 AVITAMINOSIS D: ICD-10-CM

## 2019-08-21 DIAGNOSIS — Z98.890 STATUS POST PARATHYROIDECTOMY: Primary | ICD-10-CM

## 2019-08-21 DIAGNOSIS — C73 MALIGNANT NEOPLASM OF THYROID GLAND (H): ICD-10-CM

## 2019-08-21 DIAGNOSIS — M62.838 SPASM OF MUSCLE: ICD-10-CM

## 2019-08-21 DIAGNOSIS — D89.49 OTHER MAST CELL ACTIVATION DISORDER (H): ICD-10-CM

## 2019-08-21 DIAGNOSIS — E89.0 POSTSURGICAL HYPOTHYROIDISM: ICD-10-CM

## 2019-08-21 DIAGNOSIS — Z90.89 STATUS POST PARATHYROIDECTOMY: Primary | ICD-10-CM

## 2019-08-21 DIAGNOSIS — G89.29 OTHER CHRONIC PAIN: ICD-10-CM

## 2019-08-21 DIAGNOSIS — C83.30 RETICULOSARCOMA (H): ICD-10-CM

## 2019-08-22 DIAGNOSIS — E89.2 POSTSURGICAL HYPOPARATHYROIDISM (H): ICD-10-CM

## 2019-08-22 DIAGNOSIS — E89.0 POSTSURGICAL HYPOTHYROIDISM: ICD-10-CM

## 2019-08-22 DIAGNOSIS — C73 PAPILLARY CARCINOMA, FOLLICULAR VARIANT (H): ICD-10-CM

## 2019-08-22 DIAGNOSIS — C73 THYROID CANCER (H): ICD-10-CM

## 2019-08-23 RX ORDER — LEVOTHYROXINE SODIUM 112 UG/1
TABLET ORAL
Qty: 67 TABLET | Refills: 2 | Status: SHIPPED | OUTPATIENT
Start: 2019-08-23 | End: 2019-11-10

## 2019-09-03 ENCOUNTER — OFFICE VISIT (OUTPATIENT)
Dept: UROLOGY | Facility: CLINIC | Age: 59
End: 2019-09-03
Payer: COMMERCIAL

## 2019-09-03 ENCOUNTER — ANCILLARY PROCEDURE (OUTPATIENT)
Dept: GENERAL RADIOLOGY | Facility: CLINIC | Age: 59
End: 2019-09-03
Attending: UROLOGY
Payer: COMMERCIAL

## 2019-09-03 VITALS — DIASTOLIC BLOOD PRESSURE: 81 MMHG | HEART RATE: 112 BPM | SYSTOLIC BLOOD PRESSURE: 136 MMHG | RESPIRATION RATE: 14 BRPM

## 2019-09-03 DIAGNOSIS — N20.0 KIDNEY STONE: Primary | ICD-10-CM

## 2019-09-03 PROCEDURE — 74019 RADEX ABDOMEN 2 VIEWS: CPT

## 2019-09-03 PROCEDURE — 52310 CYSTOSCOPY AND TREATMENT: CPT | Mod: 58 | Performed by: UROLOGY

## 2019-09-03 NOTE — PROGRESS NOTES
cysPatient returns to clinic for cysto and stent removal.  she is s/p ESWL recently.  KUB today shows broken renal stones with tiny stones (1 mm) seen in ureter.    Procedure: patient was brought to the cysto suite.  she was draped and prepped in the usual surgical fashion.  Cystoscopy placed. Stent removed without problems.    Recommendation:  Return to Clinic: in one year with KUB

## 2019-09-12 ENCOUNTER — AMBULATORY - HEALTHEAST (OUTPATIENT)
Dept: PALLIATIVE MEDICINE | Facility: OTHER | Age: 59
End: 2019-09-12

## 2019-09-12 ENCOUNTER — RECORDS - HEALTHEAST (OUTPATIENT)
Dept: ADMINISTRATIVE | Facility: OTHER | Age: 59
End: 2019-09-12

## 2019-09-26 ENCOUNTER — TELEPHONE (OUTPATIENT)
Dept: FAMILY MEDICINE | Facility: CLINIC | Age: 59
End: 2019-09-26

## 2019-09-26 NOTE — TELEPHONE ENCOUNTER
Reason for call:  Order   Order or referral being requested: Pain management   Reason for request: long term pain management   Date needed: as soon as possible  Has the patient been seen by the PCP for this problem? YES    Additional comments: Referral needs to be sent to [facility] pain [facility] Dr. Thanh Berry Fax # [phone] Attention to [place]. Dr. aaron  [place] [facility] calling stating that referral needs to be sent by primary Doctor in order to be accepted. Please call Dr. aaron with any questions.     Phone number to reach patient:  Other phone number:  [phone]    Best Time:  any    Can we leave a detailed message on this number?  Not Applicable

## 2019-09-27 ENCOUNTER — MYC MEDICAL ADVICE (OUTPATIENT)
Dept: ONCOLOGY | Facility: CLINIC | Age: 59
End: 2019-09-27

## 2019-09-27 ENCOUNTER — AMBULATORY - HEALTHEAST (OUTPATIENT)
Dept: PALLIATIVE MEDICINE | Facility: OTHER | Age: 59
End: 2019-09-27

## 2019-09-27 DIAGNOSIS — G89.29 CHRONIC PAIN: ICD-10-CM

## 2019-09-27 DIAGNOSIS — D89.49 OTHER MAST CELL ACTIVATION DISORDER (H): ICD-10-CM

## 2019-09-27 DIAGNOSIS — M54.50 CHRONIC LOW BACK PAIN: ICD-10-CM

## 2019-09-27 DIAGNOSIS — G89.29 CHRONIC LOW BACK PAIN: ICD-10-CM

## 2019-09-27 NOTE — TELEPHONE ENCOUNTER
Called pt to discuss symptomatic mychart; pt stated any other symptoms she has she would rather discuss with Dr. Crawley. She is only taking tylenol max dose of 4000 mg a day. Symptoms have been ongoing since end of July. Asked pt if she would be willing to discuss with SANJANA in clinic since Dr. Crawley was not in clinic and may not be able to get back to her until Monday, she stated she would prefer only Dr. Crawley call back. She verbalized she knows what is emergent and she is not going to the ED or [facility] for symptoms. Routed to Dr. Crawley.      9/30/19 6pm - I called Elvin Arias.  I was unable to reach her.  I left a message.      Shahla Crawley MD

## 2019-09-28 ENCOUNTER — HEALTH MAINTENANCE LETTER (OUTPATIENT)
Age: 59
End: 2019-09-28

## 2019-10-01 DIAGNOSIS — Z00.6 EXAMINATION OF PARTICIPANT OR CONTROL IN CLINICAL RESEARCH: Primary | ICD-10-CM

## 2019-10-08 ENCOUNTER — PATIENT OUTREACH (OUTPATIENT)
Dept: ONCOLOGY | Facility: CLINIC | Age: 59
End: 2019-10-08

## 2019-10-09 ENCOUNTER — TELEPHONE (OUTPATIENT)
Dept: ONCOLOGY | Facility: CLINIC | Age: 59
End: 2019-10-09

## 2019-10-09 DIAGNOSIS — Z85.3 PERSONAL HISTORY OF MALIGNANT NEOPLASM OF BREAST: ICD-10-CM

## 2019-10-09 DIAGNOSIS — C83.30 DIFFUSE LARGE B-CELL LYMPHOMA, UNSPECIFIED BODY REGION (H): Primary | ICD-10-CM

## 2019-10-09 NOTE — TELEPHONE ENCOUNTER
I spoke with Elvin last evening.  She had called into nursing with complaints of shoulder, hip pain and muscle cramps.  She is concerned her pain is worse from her tamoxifen use.      Elvin explained that her pain is persisting at a 10/10 pain.  SHe is using tylenol 4000 mg daily without relief.  She complains of pain in her shoulders, ribs and hips.  No radiation of pain.  Pain is constant and does not get better with any movements, etc.  She reports no weight changes.  She has hot flashes.  SHe denies fevers.  She wakes up at night sweaty and thinks this is likely from a hot flash.      She also complained of worsening muscle cramps.    We discussed that muscle cramps can occur with tamoxifen.  I recommend that she stop the medication.      I discussed that tamoxifen can be associated with body aches.  I have not ever seen 10/10 pain from tamoxifen and do not think this pain is from tamoxifen use.  Regardless I recommended she stop the medication.  We discussed that in the event it is related to tamoxifen, symptoms should improve over the next several days to few weeks.  I discussed tylenol and ibuprofen can be used for this discomfort.    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      Will arrange for labs prior to visit this week.  She may need imaging performed.    Shahla Crawley

## 2019-10-09 NOTE — LETTER
10/9/2019      RE: Tisha Arias  [address] [place] [address]       I spoke with Elvin last evening.  She had called into nursing with complaints of shoulder, hip pain and muscle cramps.  She is concerned her pain is worse from her tamoxifen use.      Elvin explained that her pain is persisting at a 10/10 pain.  SHe is using tylenol 4000 mg daily without relief.  She complains of pain in her shoulders, ribs and hips.  No radiation of pain.  Pain is constant and does not get better with any movements, etc.  She reports no weight changes.  She has hot flashes.  SHe denies fevers.  She wakes up at night sweaty and thinks this is likely from a hot flash.      She also complained of worsening muscle cramps.    We discussed that muscle cramps can occur with tamoxifen.  I recommend that she stop the medication.      I discussed that tamoxifen can be associated with body aches.  I have not ever seen 10/10 pain from tamoxifen and do not think this pain is from tamoxifen use.  Regardless I recommended she stop the medication.  We discussed that in the event it is related to tamoxifen, symptoms should improve over the next several days to few weeks.  I discussed tylenol and ibuprofen can be used for this discomfort.    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      Will arrange for labs prior to visit this week.  She may need imaging performed.      Shahla Crawley MD

## 2019-10-10 DIAGNOSIS — E89.2 POSTSURGICAL HYPOPARATHYROIDISM (H): ICD-10-CM

## 2019-10-10 DIAGNOSIS — C73 THYROID CANCER (H): ICD-10-CM

## 2019-10-10 DIAGNOSIS — C73 PAPILLARY CARCINOMA, FOLLICULAR VARIANT (H): ICD-10-CM

## 2019-10-10 DIAGNOSIS — E89.0 POSTSURGICAL HYPOTHYROIDISM: ICD-10-CM

## 2019-10-11 ENCOUNTER — RECORDS - HEALTHEAST (OUTPATIENT)
Dept: ADMINISTRATIVE | Facility: OTHER | Age: 59
End: 2019-10-11

## 2019-10-11 ENCOUNTER — ANCILLARY PROCEDURE (OUTPATIENT)
Dept: CT IMAGING | Facility: CLINIC | Age: 59
End: 2019-10-11
Attending: PHYSICIAN ASSISTANT
Payer: COMMERCIAL

## 2019-10-11 ENCOUNTER — APPOINTMENT (OUTPATIENT)
Dept: LAB | Facility: CLINIC | Age: 59
End: 2019-10-11
Attending: INTERNAL MEDICINE
Payer: COMMERCIAL

## 2019-10-11 ENCOUNTER — ONCOLOGY VISIT (OUTPATIENT)
Dept: ONCOLOGY | Facility: CLINIC | Age: 59
End: 2019-10-11
Attending: INTERNAL MEDICINE
Payer: COMMERCIAL

## 2019-10-11 VITALS
TEMPERATURE: 98.9 F | DIASTOLIC BLOOD PRESSURE: 89 MMHG | OXYGEN SATURATION: 97 % | SYSTOLIC BLOOD PRESSURE: 156 MMHG | BODY MASS INDEX: 31.33 KG/M2 | HEART RATE: 116 BPM | WEIGHT: 188.3 LBS

## 2019-10-11 DIAGNOSIS — Z98.890 STATUS POST PARATHYROIDECTOMY: ICD-10-CM

## 2019-10-11 DIAGNOSIS — E89.0 POSTSURGICAL HYPOTHYROIDISM: ICD-10-CM

## 2019-10-11 DIAGNOSIS — G89.29 OTHER CHRONIC PAIN: ICD-10-CM

## 2019-10-11 DIAGNOSIS — C73 MALIGNANT NEOPLASM OF THYROID GLAND (H): ICD-10-CM

## 2019-10-11 DIAGNOSIS — M62.838 SPASM OF MUSCLE: ICD-10-CM

## 2019-10-11 DIAGNOSIS — C83.30 DIFFUSE LARGE B-CELL LYMPHOMA, UNSPECIFIED BODY REGION (H): ICD-10-CM

## 2019-10-11 DIAGNOSIS — D89.49 OTHER MAST CELL ACTIVATION DISORDER (H): ICD-10-CM

## 2019-10-11 DIAGNOSIS — Z85.3 PERSONAL HISTORY OF MALIGNANT NEOPLASM OF BREAST: ICD-10-CM

## 2019-10-11 DIAGNOSIS — E55.9 AVITAMINOSIS D: ICD-10-CM

## 2019-10-11 DIAGNOSIS — C83.30 RETICULOSARCOMA (H): ICD-10-CM

## 2019-10-11 DIAGNOSIS — Z90.89 STATUS POST PARATHYROIDECTOMY: ICD-10-CM

## 2019-10-11 PROBLEM — K59.00 CONSTIPATION: Status: ACTIVE | Noted: 2019-04-04

## 2019-10-11 PROBLEM — C85.90 LYMPHOMA (H): Status: ACTIVE | Noted: 2019-10-11

## 2019-10-11 PROBLEM — F41.9 ANXIETY: Status: ACTIVE | Noted: 2019-04-04

## 2019-10-11 PROBLEM — F11.93 OPIOID WITHDRAWAL (H): Status: ACTIVE | Noted: 2019-04-04

## 2019-10-11 LAB
ALBUMIN SERPL-MCNC: 3.9 G/DL (ref 3.4–5)
ALP SERPL-CCNC: 70 U/L (ref 40–150)
ALT SERPL W P-5'-P-CCNC: 25 U/L (ref 0–50)
ANION GAP SERPL CALCULATED.3IONS-SCNC: 6 MMOL/L (ref 3–14)
AST SERPL W P-5'-P-CCNC: 18 U/L (ref 0–45)
BASOPHILS # BLD AUTO: 0 10E9/L (ref 0–0.2)
BASOPHILS NFR BLD AUTO: 0.4 %
BILIRUB SERPL-MCNC: 0.5 MG/DL (ref 0.2–1.3)
BUN SERPL-MCNC: 16 MG/DL (ref 7–30)
CALCIUM SERPL-MCNC: 8.5 MG/DL (ref 8.5–10.1)
CHLORIDE SERPL-SCNC: 103 MMOL/L (ref 94–109)
CO2 SERPL-SCNC: 29 MMOL/L (ref 20–32)
CREAT SERPL-MCNC: 0.84 MG/DL (ref 0.52–1.04)
DIFFERENTIAL METHOD BLD: ABNORMAL
EOSINOPHIL # BLD AUTO: 0 10E9/L (ref 0–0.7)
EOSINOPHIL NFR BLD AUTO: 0.4 %
ERYTHROCYTE [DISTWIDTH] IN BLOOD BY AUTOMATED COUNT: 11.7 % (ref 10–15)
GFR SERPL CREATININE-BSD FRML MDRD: 76 ML/MIN/{1.73_M2}
GLUCOSE SERPL-MCNC: 114 MG/DL (ref 70–99)
HCT VFR BLD AUTO: 43.3 % (ref 35–47)
HGB BLD-MCNC: 14.5 G/DL (ref 11.7–15.7)
IMM GRANULOCYTES # BLD: 0 10E9/L (ref 0–0.4)
IMM GRANULOCYTES NFR BLD: 0.4 %
IRON SATN MFR SERPL: 23 % (ref 15–46)
IRON SERPL-MCNC: 101 UG/DL (ref 35–180)
LDH SERPL L TO P-CCNC: 232 U/L (ref 81–234)
LYMPHOCYTES # BLD AUTO: 1 10E9/L (ref 0.8–5.3)
LYMPHOCYTES NFR BLD AUTO: 21.3 %
MCH RBC QN AUTO: 33.1 PG (ref 26.5–33)
MCHC RBC AUTO-ENTMCNC: 33.5 G/DL (ref 31.5–36.5)
MCV RBC AUTO: 99 FL (ref 78–100)
MONOCYTES # BLD AUTO: 0.5 10E9/L (ref 0–1.3)
MONOCYTES NFR BLD AUTO: 11.7 %
NEUTROPHILS # BLD AUTO: 3 10E9/L (ref 1.6–8.3)
NEUTROPHILS NFR BLD AUTO: 65.8 %
NRBC # BLD AUTO: 0 10*3/UL
NRBC BLD AUTO-RTO: 0 /100
PLATELET # BLD AUTO: 206 10E9/L (ref 150–450)
POTASSIUM SERPL-SCNC: 3.8 MMOL/L (ref 3.4–5.3)
PROT SERPL-MCNC: 7.4 G/DL (ref 6.8–8.8)
RBC # BLD AUTO: 4.38 10E12/L (ref 3.8–5.2)
SODIUM SERPL-SCNC: 138 MMOL/L (ref 133–144)
TIBC SERPL-MCNC: 442 UG/DL (ref 240–430)
URATE SERPL-MCNC: 7 MG/DL (ref 2.6–6)
WBC # BLD AUTO: 4.6 10E9/L (ref 4–11)

## 2019-10-11 PROCEDURE — G0008 ADMIN INFLUENZA VIRUS VAC: HCPCS

## 2019-10-11 PROCEDURE — 82784 ASSAY IGA/IGD/IGG/IGM EACH: CPT | Performed by: INTERNAL MEDICINE

## 2019-10-11 PROCEDURE — 83540 ASSAY OF IRON: CPT | Performed by: INTERNAL MEDICINE

## 2019-10-11 PROCEDURE — 80053 COMPREHEN METABOLIC PANEL: CPT | Performed by: INTERNAL MEDICINE

## 2019-10-11 PROCEDURE — G0463 HOSPITAL OUTPT CLINIC VISIT: HCPCS

## 2019-10-11 PROCEDURE — 25000128 H RX IP 250 OP 636: Mod: ZF | Performed by: PHYSICIAN ASSISTANT

## 2019-10-11 PROCEDURE — 36415 COLL VENOUS BLD VENIPUNCTURE: CPT

## 2019-10-11 PROCEDURE — 83615 LACTATE (LD) (LDH) ENZYME: CPT | Performed by: INTERNAL MEDICINE

## 2019-10-11 PROCEDURE — 85025 COMPLETE CBC W/AUTO DIFF WBC: CPT | Performed by: INTERNAL MEDICINE

## 2019-10-11 PROCEDURE — 84550 ASSAY OF BLOOD/URIC ACID: CPT | Performed by: INTERNAL MEDICINE

## 2019-10-11 PROCEDURE — 83550 IRON BINDING TEST: CPT | Performed by: INTERNAL MEDICINE

## 2019-10-11 PROCEDURE — 82652 VIT D 1 25-DIHYDROXY: CPT | Performed by: INTERNAL MEDICINE

## 2019-10-11 PROCEDURE — 99215 OFFICE O/P EST HI 40 MIN: CPT | Mod: ZP | Performed by: PHYSICIAN ASSISTANT

## 2019-10-11 RX ORDER — METHOCARBAMOL 750 MG/1
TABLET, FILM COATED ORAL
Refills: 3 | COMMUNITY
Start: 2019-10-02 | End: 2021-08-11

## 2019-10-11 RX ORDER — IOPAMIDOL 755 MG/ML
64 INJECTION, SOLUTION INTRAVASCULAR ONCE
Status: COMPLETED | OUTPATIENT
Start: 2019-10-11 | End: 2019-10-11

## 2019-10-11 RX ORDER — CYCLOBENZAPRINE HCL 10 MG
10 TABLET ORAL 3 TIMES DAILY PRN
COMMUNITY
End: 2019-10-18

## 2019-10-11 RX ORDER — ACETAMINOPHEN 160 MG
2000 TABLET,DISINTEGRATING ORAL DAILY
Qty: 90 CAPSULE | Refills: 1 | Status: SHIPPED | OUTPATIENT
Start: 2019-10-11 | End: 2020-03-23

## 2019-10-11 RX ORDER — HEPARIN SODIUM (PORCINE) LOCK FLUSH IV SOLN 100 UNIT/ML 100 UNIT/ML
5 SOLUTION INTRAVENOUS ONCE
Status: COMPLETED | OUTPATIENT
Start: 2019-10-11 | End: 2019-10-11

## 2019-10-11 RX ORDER — LAMOTRIGINE 25 MG/1
TABLET ORAL
Refills: 3 | COMMUNITY
Start: 2019-10-02 | End: 2019-12-02

## 2019-10-11 RX ADMIN — IOPAMIDOL 64 ML: 755 INJECTION, SOLUTION INTRAVASCULAR at 17:28

## 2019-10-11 RX ADMIN — HEPARIN 5 ML: 100 SYRINGE at 15:44

## 2019-10-11 ASSESSMENT — PAIN SCALES - GENERAL: PAINLEVEL: WORST PAIN (10)

## 2019-10-11 NOTE — LETTER
"10/11/2019      RE: Tisha Arias  [address] [place] [address]       t neHEMATOLOGY/ONCOLOGY PROGRESS NOTE  Oct 11, 2019    REASON FOR VISIT: add-on for symptoms    DIAGNOSIS:   Tisha \"Elvin\" Hugo is a 59-year-old female with history of right breast cancer, thyroid cancer, as well as diffuse large B cell lymphoma (DLBCL). Her oncologic history is as follows:    Breast cancer:   - Diagnosed December 2011 with a T1c N0 M0 infiltrating ductal carcinoma of the right breast, s/p bilateral mastectomy with immediate reconstruction 1/30/2012. Final pathology showed a 1.6 cm infiltrating ductal carcinoma, grade 1/3, no angiolymphatic invasion, nipple, or skin invasion. There was associated DCIS with clear margins, closest margin anteriorly by 0.1 cm. This was ER/IA-positive, HER2-negative. OncotypeDX low risk score of 11, with overall risk of recurrence about 7% after 5 years of Tamoxifen.   - She started Arimidex 02/2012.   - She underwent prophylactic hysterectomy 12/2012.   - She switched from Arimidex to Tamoxifen February 2012.   - Her course has been complicated by extreme chest wall pain, myofascial pain, and neuropathic pain.    Thyroid cancer:   - PET 4/2013 showing thyroid nodule, biopsy showing papillary carcinoma. She underwent resection and radioidoine treatment    DLBCL:  - Presented to ER 9/1/2017 with chest pain. Imaging showed a 3 cm paraspinal mass and subcarinal lymphadenopathy. PET showing paraspinal mass along with hilar and mediastinal nodes. Biopsy of the T10 vertebral body showing high-grade B cell lymphoma. FNA EUS of subcarinal LN on 9/14/17 showing mostly necrotic tissue with suspicion for malignancy  - Received 6 cycles of R-DA-EPOCH (Rituxan, Cytoxan, Doxorubicin, etoposide, vincristine), completed January 2018    INTERVAL HISTORY:   Elvin is here on an add-on basis for multiple concerns. She has an array of symptoms that she reports all started sometime in September and " "areworsening now.    1. Blurry vision. She reports seeing eye doctor and doesn't have cataracts. Blurriness without any diplopia, flashing lights, or field cuts. Has \"occiptal headache\".    2. Widespread pains, reported as \"deep\", located in the L shoulder, epigastric area, bilateral lung bases (described as pain with inhalation), hips, and sternal area. The latter is described as being remniscent to her prior pains from the lymphoma. She has her chronic chest wall pain s/p mastectomy, for which she is no longer on opiate pain medications. She is taking tylenol every 4 hours. She reports she cannot have NSAIDs.    3. Severe muscle cramping in the legs, mostly at night.    4. Hot flashes and sweats, without fevers.    5. Pain with eating, decreased PO intake, with mild nausea, without emesis. Epigastric pain as above. No bowel changes.    6. Mild bilateral leg swelling.    She stopped Tamoxifen a few days ago with no change any any of the above symptoms.    She has had no SOB.        Current Outpatient Medications   Medication Sig Dispense Refill     BRIANA RAMIREZ PO Take 50 mg by mouth       acetaminophen (TYLENOL) 325 MG tablet Take 2 tablets (650 mg) by mouth every 4 hours as needed for mild pain or fever 100 tablet      albuterol (VENTOLIN HFA) 108 (90 Base) MCG/ACT inhaler Inhale 1-2 puffs into the lungs every 4 hours as needed for shortness of breath / dyspnea or wheezing 18 g 3     aluminum chloride (DRYSOL) 20 % external solution Apply topically At Bedtime 60 mL 3     bisacodyl (DULCOLAX) 5 MG EC tablet Take 3 tablets (15 mg) by mouth daily as needed for constipation 30 tablet 0     calcitRIOL (ROCALTROL) 0.25 MCG capsule 0.5 mcg AM and 0.25 mcg  capsule 3     calcium carbonate antacid (TUMS ULTRA 1000) 1000 MG CHEW Take 1 tablet (1,000 mg) by mouth 4 times daily as needed (severe muscle cramps)       calcium citrate-vitamin D (CALCIUM CITRATE + D3) 315-250 MG-UNIT TABS per tablet Take 2 tablets by mouth 2 " times daily 60 tablet 2     celecoxib (CELEBREX) 200 MG capsule Take 1 capsule (200 mg) by mouth 2 times daily 60 capsule 2     cetirizine (ZYRTEC ALLERGY) 10 MG tablet Take 1 tablet (10 mg) by mouth 3 times daily On hold for lab test. 90 tablet 0     Cholecalciferol (VITAMIN D3) 2000 units CAPS Take 2,000 Units by mouth daily 90 capsule 1     colchicine (COLCYRS) 0.6 MG tablet Take 1 tablet (0.6 mg) by mouth 3 times daily 270 tablet 3     COMPOUND CONTAINING CONTROLLED SUBSTANCE (CMPD RX) - PHARMACY TO MIX COMPOUNDED MEDICATION Ketamine 6%, Lidocaine 10% in PLO    Apply small amount to area BID  g 0     cromolyn (OPTICROM) 4 % ophthalmic solution Place 1 drop into both eyes 4 times daily 10 mL 0     cromolyn sodium (NASALCROM) 5.2 MG/ACT AERS Inhaler SPRAY ONE SPRAY( 1 ML) IN NOSTRIL DAILY 1 Bottle 6     [employer] FIBER GUMMY BEARS CHILDREN CHEW Take 5 g by mouth daily (2 gummy= 5 g =1 serving) 120 tablet 3     cyclobenzaprine (FLEXERIL) 5 MG tablet Take 1 tablet (5 mg) by mouth 3 times daily as needed for muscle spasms 90 tablet 0     diazepam (VALIUM) 5 MG tablet TAKE ONE TABLET ( 5 MG) BY MOUTH THREE TIMES DAILY AS NEEDED FOR MUSCLE SPASMS. FILL 10/08/2018 90 tablet 0     diclofenac (VOLTAREN) 1 % topical gel Apply affected area two times daily as needed using enclosed dosing card. (2-4 grams to chest wall) 100 g 0     EPINEPHrine (EPIPEN 2-CARIDAD) 0.3 MG/0.3ML injection 2-pack Inject 0.3 mLs (0.3 mg) into the muscle once as needed for anaphylaxis 0.6 mL 3     fluticasone (FLONASE) 50 MCG/ACT nasal spray Spray 1-2 sprays into both nostrils daily 16 g 0     furosemide (LASIX) 20 MG tablet Take 1 tablet (20 mg) by mouth 2 times daily 90 tablet 3     KLOR-CON 20 MEQ CR tablet TAKE ONE TABLET BY MOUTH TWICE DAILY  60 tablet 2     levothyroxine (SYNTHROID/LEVOTHROID) 112 MCG tablet ON MONDAY THRU SATURDAY TAKE TWO TABLETS DAILY AND ON SUNDAY TAKE 3 TABLETS DAILY. 67 tablet 2     lidocaine (XYLOCAINE) 5 % ointment Apply  quarter size amount to chest and back up to 3 times daily as needed for pain. 350 g 1     lidocaine-prilocaine (EMLA) cream Apply topically as needed for moderate pain 60 g 1     magic mouthwash (FIRST-MOUTHWASH BLM) suspension Swish and spit 5-10 mLs in mouth every 6 hours as needed for mouth sores 237 mL 1     magnesium 200 MG TABS Take 200 mg by mouth 3 times daily       montelukast (SINGULAIR) 10 MG tablet Take 2 tablets (20 mg) by mouth 2 times daily 120 tablet 11     naloxone (NARCAN) nasal spray Spray 1 spray (4 mg) into one nostril alternating nostrils as needed for opioid reversal every 2-3 minutes until assistance arrives 0.2 mL 0     OLANZapine (ZYPREXA) 5 MG tablet Take 1 tablet (5 mg) by mouth At Bedtime 7 tablet 0     olopatadine (PATANOL) 0.1 % ophthalmic solution Apply 1 drop to eye       ondansetron (ZOFRAN-ODT) 8 MG ODT tab Take 1 tablet (8 mg) by mouth every 8 hours as needed 30 tablet 0     order for DME Compression socks - knee high 20-30 mmHg zippered if possible 1 Units 11     polyethylene glycol (MIRALAX) powder Take 17 g (1 capful) by mouth daily 510 g 0     Probiotic Product (PROBIOTIC DAILY PO) Take 1 capsule by mouth daily Lacto acid bifidobacterium       prochlorperazine (COMPAZINE) 10 MG tablet Take 10 mg by mouth as needed  3     ranitidine (ZANTAC) 75 MG tablet Take 1 tablet (75 mg) by mouth 3 times daily 90 tablet 0     senna-docusate (SENOKOT-S;PERICOLACE) 8.6-50 MG per tablet Take 1-2 tablets by mouth 2 times daily as needed for constipation 60 tablet 0     SUMAtriptan (IMITREX) 50 MG tablet Take 1 tablet (50 mg) by mouth as needed for migraine 30 tablet 0     tamoxifen (NOLVADEX) 20 MG tablet Take 1 tablet (20 mg) by mouth daily 90 tablet 1     tiZANidine (ZANAFLEX) 4 MG capsule Take 1 capsule (4 mg) by mouth 3 times daily 90 capsule 0     traMADol (ULTRAM) 50 MG tablet Take 1 tablet (50 mg) by mouth every 6 hours as needed for severe pain 30 tablet 0     triamcinolone (KENALOG)  "0.025 % ointment Apply topically as needed  3     Triamcinolone Acetonide (AZMACORT IN) Inhale 2 puffs into the lungs as needed       UNABLE TO FIND Take 2 capsules by mouth 3 times daily Muscle Mag. 2 caps contain B1 20mg, B2 20mg, B6 10mg, magesium 20mg, manganese 2mg.       UNABLE TO FIND 3 tablets 3 times daily MEDICATION NAME: calcium D-Glucarate   3 caps contain 180mg of elemental calcium.       UNABLE TO FIND 2 tablets 3 times daily MEDICATION NAME: Digestzymes        UNABLE TO FIND 1 tablet daily MEDICATION NAME: Pure Encapsulations            Allergies   Allergen Reactions     Baclofen Other (See Comments) and Unknown     Other reaction(s): Edema, chest pain, seizures.   Other reaction(s): Chest Pain, Edema, Headache  Seizures     Buprenorphine      Other reaction(s): Chest Pain  Difficulty swallowing, GI cramping     Clonidine Other (See Comments)     \"Seizures\"     Duloxetine Anaphylaxis and Other (See Comments)     Flushing, tremor/muscle twitching and edema  Serotonin syndrome  Other reaction(s): Ataxia  Flushing, tremor/muscle twitching and edema  Serotonin syndrome  SOB palpitations itching rash     Methocarbamol Other (See Comments)     Swelling and chest pain  Other reaction(s): Angioedema  Swelling in chest     No Clinical Screening - See Comments Shortness Of Breath, Palpitations, Anaphylaxis, Itching, Swelling, Difficulty breathing and Rash     Sukhjinder wipes- oral allergy -  July 2015: throat tightness from a Chinese herbal medicine Wilmer Tran  Other reaction(s): Edema, Tongue Swelling  Sukhjinder wipes  Has anaphylactic reactions to varied environmental things.  Carries an epi-pen  Oral allergy syndrome enviromental- food intolerances and animal dander birch trees potatoes carrots cherries celery apple pears plums peaches parsnip kiwi hazelnuts apricots     Carries epi pen     Nuts Shortness Of Breath     Other reaction(s): Throat Swelling/Closing  Please check herbal formulas for these " "allergens prior to prescribing.     Serotonin Anxiety, Other (See Comments) and Swelling     Seizures    Anxiety swelling seizures     Suboxone      Severe chest pain, swelling (face, eyes,feet,legs), All over itching, tightness is throat, fatigue, feels anxious, headache     Amitriptyline Other (See Comments)     Other reaction(s): *Unknown     Amitriptyline Hcl Swelling     Birch Trees      Potatoes, carrots, cherries, celery, apple, pears, plums, peaches, parsnip, kiwi, hazelnuts, and apricots,      Blue Dyes Itching     Headaches    headache       Buprenorphine-Naloxone      Other reaction(s): Chest Pain  Difficulty swallowing, GI cramping     Codeine Nausea     Vomiting       Cymbalta Other (See Comments)     Flushing, tremor/muscle twitching and edema     Gabapentin Other (See Comments)     edema  Systemic edema, weaned off from Feb to March per Dr. Dowd.    edema  Other reaction(s): Edema  edema  Systemic edema, weaned off from Feb to March per Dr. Dowd.       Grass      Hydroxyzine      Per patient \"Headache, chest pounding/pressure, shivering/tremors, goosebumps, sweating, swelling in feet and legs, agitation, back pain, joint pain, rigidity of muscles in fingers/wrist/feet, vision, pins/needles whole body-emili face and arms, rapid heart beat, nausea/vomiting, painful urination, itching, dry mouth\"     Metaxalone      Other reaction(s): Vomiting     Mugwort [Artemisia Vulgaris]      Various spices     Pollen Extract      Other reaction(s): *Unknown  Mugwort, ragweed's melons bananas cucumbers zucchini     Pregabalin      Ragweeds      Melons, bananas, cucumbers, zucchini.     Topamax      Topiramate      Other reaction(s): Ataxia  seotonin syndrome       Nortriptyline Itching, Visual Disturbance, Swelling, GI Disturbance, Anxiety, Other (See Comments) and Nausea     Other reaction(s): Swelling  Other reaction(s): Edema, Headache  Other reaction(s): Swelling       PHYSICAL EXAMINATION  BP (!) 156/89 (BP " Location: Left arm, Patient Position: Chair, Cuff Size: Adult Regular)   Pulse 116   Temp 98.9  F (37.2  C) (Oral)   Wt 85.4 kg (188 lb 4.8 oz)   SpO2 97%   BMI 31.33 kg/m      Oxygen decreased to 90-01% after walking. Tachycardic to 130 with slow ambulation.  Constitutional: Alert, oriented female in no visible distress.  Eyes: PERRL. Anicteric sclerae.  ENT/Mouth: OM moist and pink without lesions or thrush.  CV: RRR, no murmurs appreciated. midly tachycardic  Resp: CTAB throughout  Abdomen: Soft, non-distended. Bowel sounds present. No masses appreciated. She has discomfort in epigastric area without rebound.  Extremities: R>L sockline edema extremity edema appreciated.  Skin: Warm, dry. No bruising or petechiae noted.  Lymph: No cervical or supraclavicular lymphadenopathy appreciated.   Neuro: CN II-XII grossly intact.    LABS:  Results for orders placed or performed in visit on 10/11/19 (from the past 24 hour(s))   *CBC with platelets differential   Result Value Ref Range    WBC 4.6 4.0 - 11.0 10e9/L    RBC Count 4.38 3.8 - 5.2 10e12/L    Hemoglobin 14.5 11.7 - 15.7 g/dL    Hematocrit 43.3 35.0 - 47.0 %    MCV 99 78 - 100 fl    MCH 33.1 (H) 26.5 - 33.0 pg    MCHC 33.5 31.5 - 36.5 g/dL    RDW 11.7 10.0 - 15.0 %    Platelet Count 206 150 - 450 10e9/L    Diff Method Automated Method     % Neutrophils 65.8 %    % Lymphocytes 21.3 %    % Monocytes 11.7 %    % Eosinophils 0.4 %    % Basophils 0.4 %    % Immature Granulocytes 0.4 %    Nucleated RBCs 0 0 /100    Absolute Neutrophil 3.0 1.6 - 8.3 10e9/L    Absolute Lymphocytes 1.0 0.8 - 5.3 10e9/L    Absolute Monocytes 0.5 0.0 - 1.3 10e9/L    Absolute Eosinophils 0.0 0.0 - 0.7 10e9/L    Absolute Basophils 0.0 0.0 - 0.2 10e9/L    Abs Immature Granulocytes 0.0 0 - 0.4 10e9/L    Absolute Nucleated RBC 0.0    Uric acid   Result Value Ref Range    Uric Acid 7.0 (H) 2.6 - 6.0 mg/dL   Lactate Dehydrogenase   Result Value Ref Range    Lactate Dehydrogenase 232 81 - 234 U/L    Comprehensive metabolic panel   Result Value Ref Range    Sodium 138 133 - 144 mmol/L    Potassium 3.8 3.4 - 5.3 mmol/L    Chloride 103 94 - 109 mmol/L    Carbon Dioxide 29 20 - 32 mmol/L    Anion Gap 6 3 - 14 mmol/L    Glucose 114 (H) 70 - 99 mg/dL    Urea Nitrogen 16 7 - 30 mg/dL    Creatinine 0.84 0.52 - 1.04 mg/dL    GFR Estimate 76 >60 mL/min/[1.73_m2]    GFR Estimate If Black 88 >60 mL/min/[1.73_m2]    Calcium 8.5 8.5 - 10.1 mg/dL    Bilirubin Total 0.5 0.2 - 1.3 mg/dL    Albumin 3.9 3.4 - 5.0 g/dL    Protein Total 7.4 6.8 - 8.8 g/dL    Alkaline Phosphatase 70 40 - 150 U/L    ALT 25 0 - 50 U/L    AST 18 0 - 45 U/L   **Iron and iron binding capacity FUTURE anytime   Result Value Ref Range    Iron 101 35 - 180 ug/dL    Iron Binding Cap 442 (H) 240 - 430 ug/dL    Iron Saturation Index 23 15 - 46 %     *Note: Due to a large number of results and/or encounters for the requested time period, some results have not been displayed. A complete set of results can be found in Results Review.         IMPRESSION/PLAN:  Tisha Arias is a 59 year old female with history of a stage I, ER/MI positive, HER2-negative right breast cancer in 2011, s/p bilateral mastectomy and BENJIE/BSO, now on adjuvant endocrine therapy, Thyroid cancer s/p resection and radioiodine ablation, and DLBCL, treated with 6 cycles of R-DA-EPOCH completed January 2018, now on surveillance.  She is here today on an add-on basis for multiple symptoms.    Elvin is having an array of symptoms, all of which started 3 weeks or so ago, and have been progressively worsening. This includes bilateral blurry vision, occipital headaches, hot flashes/sweats, mild nausea, decreased intake, mild bilateral leg swelling, and pain involving multiple foci throughout the appendicular and axial skeleton.    Her lab work is grossly unremarkable. LDH is normal. Uric acid is stably elevated, so unlikely to be causing any of her new symptoms. Exam is unremarkable with the  exception of trace sock line edema, though R > L. I did ambulate her in clinic and with slow ambulation, her HR increased to 130, improved briskly upon rest to ~100. She maintained her saturations int he 95-97% range with ambulation, but briefly desaturated to low 90s (toro 90) post-ambulation with a recovery to baseline over several minutes.    I discussed with Elvin that there are various etiologies for her symptoms. It is unclear to me if there is one umbrella diagnosis that is causing all of her symptoms, or if there are multiple things contributing to how poorly she is feeling. Given she has been on Tamoxifen, there is a concern for VTE,particularly given her pleuritc pain, tachycardia, and desaturation post-ambulation. Recurrent lymphoma is also on the DDx. Ultimately, I think we need to rule out a PE first, and given that it is late on a Friday and we are unable to do all of the imaging tonight, I recommended that we proceed with a CT PE now, and then return early next week for a CT neck, abdomen, pelvis.    She then asked me what I would do to help with her pain. She is currently on Tylenol every 4 hours with no relief. SHe reports she cannot take NSAIDs and wants something stronger. I discussed with Elvin that I will not prescribe opiates at this time given that I don't know what I am treating, for one, and second, I would not want to start her on opiates now given how hard she has worked to get off of long term narcotics. She become very upset with this and the trajectory of the visit become more tense. She asked why I would let her be in pain all weekend while we await all of the testing. I did recommend the emergency room given her severe pain, to which she declined multiple times.    Ultimately, she was agreeable to get the CT PE today, understanding that if she does have a blood clot, I will need to direct her to the emergency room given her symptoms and severe pain. We will plan to obtain the further  imaging (CT neck, A/P) as soon as possible on a later date. I still don't know what is causing her blurry vision, and ultimately we may need a brain MRI.    The remainder of the plan is as follows:    DLBCL: Currently in surveillance, last saw. Dr. Sauceda July 2019 with plan for follow-up in 6 months. Most recent imaging with CT CAP 7/1/19 was without evidence of recurrence of disease. Surveillence imaging PRN. Her cancer was initially diagnosed in setting of pain at similar sites as having presently, so imaging as above.    History of breast cancer: She has been on adjuvant endocrine therapy since 02/2012. With reports of muscle cramping earlier this week, her Tamoxifen was placed on hold. No change in above symptoms with holding, yet.    Blurry vision: Serrato use can cause cataracts, though she reports this was ruled out by her eye doctor. She is having occipital headaches, otherwise no focal neuro symptoms. Will start with imaging as above and then consider brain MRI.    Papillary thyroid cancer: Follows with Dr. Castro.    Pain control: She has complex pain following mastectomies. She did see Dr. Liliana Ma in Pain medicine, at which time she was started on an opioid taper given dependence on the medication. She no longer follows with Dr. Benitez. FOr her pains, she is currently on celebrex 200 mg twice daily, flexeril 5 mg TID. This has been seeing her primary care provider for pain. Continue Tylenol for current pains. If indeed there is e/o cancer on her scans and stronger pain control is needed, we will likely need to enlist the assistance of either palliative medicine or pain medicine.        I spent >40 minutes with the patient, with over 50% of the time spent counseling or coordinating their care as described above.      Evelia Johns PA-C  Hematology, Oncology, and Transplant  [facility]  [address] [place] [id number]  [phone]    Addendum: CT PE negative per  "prelim.    Message sent to scheduling to arrange CT neck, CAP for early next week. Spoke with Elvin on the phone who continued to voice frustration. She expressed \"don't you care about my eyes.\" I explained to her that we need to triage what we are doing and right now she is havnig severe pains in her body that need to be addressed. I did ask her again to go to ER if pains, visual symptoms, etc are worsening. ECT      Evelia Johns PA-C      "

## 2019-10-11 NOTE — NURSING NOTE
"Oncology Rooming Note    October 11, 2019 3:58 PM   Tisha Arias is a 59 year old female who presents for:    Chief Complaint   Patient presents with     Port Draw     labs drawn via port by RN in lab     Oncology Clinic Visit     Return; Breast Ca     Initial Vitals: BP (!) 156/89 (BP Location: Left arm, Patient Position: Chair, Cuff Size: Adult Regular)   Pulse 116   Temp 98.9  F (37.2  C) (Oral)   Wt 85.4 kg (188 lb 4.8 oz)   SpO2 97%   BMI 31.33 kg/m   Estimated body mass index is 31.33 kg/m  as calculated from the following:    Height as of 7/1/19: 1.651 m (5' 5\").    Weight as of this encounter: 85.4 kg (188 lb 4.8 oz). Body surface area is 1.98 meters squared.  Worst Pain (10) Comment: Data Unavailable   No LMP recorded. Patient has had a hysterectomy.  Allergies reviewed: Yes  Medications reviewed: Yes    Medications: MEDICATION REFILLS NEEDED TODAY. Provider was notified.  Pharmacy name entered into [employer]: [id number] - [place], [place] - [address] [address] [place]    Clinical concerns: Needs refill for Diazepam and Flexeril        India Hassan CMA              "

## 2019-10-11 NOTE — NURSING NOTE
Chief Complaint   Patient presents with     Port Draw     labs drawn via port by RN in lab     Oncology Clinic Visit     Return; Breast Ca     BP (!) 156/89 (BP Location: Left arm, Patient Position: Chair, Cuff Size: Adult Regular)   Pulse 116   Temp 98.9  F (37.2  C) (Oral)   Wt 85.4 kg (188 lb 4.8 oz)   SpO2 97%   BMI 31.33 kg/m      Port accessed by RN in lab. Labs collected and sent. Line flushed with NS & Heparin. Pt tolerated well.   Pt checked in for next appointment.    Crystal Saba, RN

## 2019-10-11 NOTE — PROGRESS NOTES
"t neHEMATOLOGY/ONCOLOGY PROGRESS NOTE  Oct 11, 2019    REASON FOR VISIT: add-on for symptoms    DIAGNOSIS:   Tisha \"Elvin\" Hugo is a 59-year-old female with history of right breast cancer, thyroid cancer, as well as diffuse large B cell lymphoma (DLBCL). Her oncologic history is as follows:    Breast cancer:   - Diagnosed December 2011 with a T1c N0 M0 infiltrating ductal carcinoma of the right breast, s/p bilateral mastectomy with immediate reconstruction 1/30/2012. Final pathology showed a 1.6 cm infiltrating ductal carcinoma, grade 1/3, no angiolymphatic invasion, nipple, or skin invasion. There was associated DCIS with clear margins, closest margin anteriorly by 0.1 cm. This was ER/OH-positive, HER2-negative. OncotypeDX low risk score of 11, with overall risk of recurrence about 7% after 5 years of Tamoxifen.   - She started Arimidex 02/2012.   - She underwent prophylactic hysterectomy 12/2012.   - She switched from Arimidex to Tamoxifen February 2012.   - Her course has been complicated by extreme chest wall pain, myofascial pain, and neuropathic pain.    Thyroid cancer:   - PET 4/2013 showing thyroid nodule, biopsy showing papillary carcinoma. She underwent resection and radioidoine treatment    DLBCL:  - Presented to ER 9/1/2017 with chest pain. Imaging showed a 3 cm paraspinal mass and subcarinal lymphadenopathy. PET showing paraspinal mass along with hilar and mediastinal nodes. Biopsy of the T10 vertebral body showing high-grade B cell lymphoma. FNA EUS of subcarinal LN on 9/14/17 showing mostly necrotic tissue with suspicion for malignancy  - Received 6 cycles of R-DA-EPOCH (Rituxan, Cytoxan, Doxorubicin, etoposide, vincristine), completed January 2018    INTERVAL HISTORY:   Elvin is here on an add-on basis for multiple concerns. She has an array of symptoms that she reports all started sometime in September and areworsening now.    1. Blurry vision. She reports seeing eye doctor and doesn't have " "cataracts. Blurriness without any diplopia, flashing lights, or field cuts. Has \"occiptal headache\".    2. Widespread pains, reported as \"deep\", located in the L shoulder, epigastric area, bilateral lung bases (described as pain with inhalation), hips, and sternal area. The latter is described as being remniscent to her prior pains from the lymphoma. She has her chronic chest wall pain s/p mastectomy, for which she is no longer on opiate pain medications. She is taking tylenol every 4 hours. She reports she cannot have NSAIDs.    3. Severe muscle cramping in the legs, mostly at night.    4. Hot flashes and sweats, without fevers.    5. Pain with eating, decreased PO intake, with mild nausea, without emesis. Epigastric pain as above. No bowel changes.    6. Mild bilateral leg swelling.    She stopped Tamoxifen a few days ago with no change any any of the above symptoms.    She has had no SOB.        Current Outpatient Medications   Medication Sig Dispense Refill     ACAI RAMIREZ PO Take 50 mg by mouth       acetaminophen (TYLENOL) 325 MG tablet Take 2 tablets (650 mg) by mouth every 4 hours as needed for mild pain or fever 100 tablet      albuterol (VENTOLIN HFA) 108 (90 Base) MCG/ACT inhaler Inhale 1-2 puffs into the lungs every 4 hours as needed for shortness of breath / dyspnea or wheezing 18 g 3     aluminum chloride (DRYSOL) 20 % external solution Apply topically At Bedtime 60 mL 3     bisacodyl (DULCOLAX) 5 MG EC tablet Take 3 tablets (15 mg) by mouth daily as needed for constipation 30 tablet 0     calcitRIOL (ROCALTROL) 0.25 MCG capsule 0.5 mcg AM and 0.25 mcg  capsule 3     calcium carbonate antacid (TUMS ULTRA 1000) 1000 MG CHEW Take 1 tablet (1,000 mg) by mouth 4 times daily as needed (severe muscle cramps)       calcium citrate-vitamin D (CALCIUM CITRATE + D3) 315-250 MG-UNIT TABS per tablet Take 2 tablets by mouth 2 times daily 60 tablet 2     celecoxib (CELEBREX) 200 MG capsule Take 1 capsule (200 " mg) by mouth 2 times daily 60 capsule 2     cetirizine (ZYRTEC ALLERGY) 10 MG tablet Take 1 tablet (10 mg) by mouth 3 times daily On hold for lab test. 90 tablet 0     Cholecalciferol (VITAMIN D3) 2000 units CAPS Take 2,000 Units by mouth daily 90 capsule 1     colchicine (COLCYRS) 0.6 MG tablet Take 1 tablet (0.6 mg) by mouth 3 times daily 270 tablet 3     COMPOUND CONTAINING CONTROLLED SUBSTANCE (CMPD RX) - PHARMACY TO MIX COMPOUNDED MEDICATION Ketamine 6%, Lidocaine 10% in PLO    Apply small amount to area BID  g 0     cromolyn (OPTICROM) 4 % ophthalmic solution Place 1 drop into both eyes 4 times daily 10 mL 0     cromolyn sodium (NASALCROM) 5.2 MG/ACT AERS Inhaler SPRAY ONE SPRAY( 1 ML) IN NOSTRIL DAILY 1 Bottle 6     [employer] FIBER GUMMY BEARS CHILDREN CHEW Take 5 g by mouth daily (2 gummy= 5 g =1 serving) 120 tablet 3     cyclobenzaprine (FLEXERIL) 5 MG tablet Take 1 tablet (5 mg) by mouth 3 times daily as needed for muscle spasms 90 tablet 0     diazepam (VALIUM) 5 MG tablet TAKE ONE TABLET ( 5 MG) BY MOUTH THREE TIMES DAILY AS NEEDED FOR MUSCLE SPASMS. FILL 10/08/2018 90 tablet 0     diclofenac (VOLTAREN) 1 % topical gel Apply affected area two times daily as needed using enclosed dosing card. (2-4 grams to chest wall) 100 g 0     EPINEPHrine (EPIPEN 2-CARIDAD) 0.3 MG/0.3ML injection 2-pack Inject 0.3 mLs (0.3 mg) into the muscle once as needed for anaphylaxis 0.6 mL 3     fluticasone (FLONASE) 50 MCG/ACT nasal spray Spray 1-2 sprays into both nostrils daily 16 g 0     furosemide (LASIX) 20 MG tablet Take 1 tablet (20 mg) by mouth 2 times daily 90 tablet 3     KLOR-CON 20 MEQ CR tablet TAKE ONE TABLET BY MOUTH TWICE DAILY  60 tablet 2     levothyroxine (SYNTHROID/LEVOTHROID) 112 MCG tablet ON MONDAY THRU SATURDAY TAKE TWO TABLETS DAILY AND ON SUNDAY TAKE 3 TABLETS DAILY. 67 tablet 2     lidocaine (XYLOCAINE) 5 % ointment Apply quarter size amount to chest and back up to 3 times daily as needed for pain. 350 g  1     lidocaine-prilocaine (EMLA) cream Apply topically as needed for moderate pain 60 g 1     magic mouthwash (FIRST-MOUTHWASH BLM) suspension Swish and spit 5-10 mLs in mouth every 6 hours as needed for mouth sores 237 mL 1     magnesium 200 MG TABS Take 200 mg by mouth 3 times daily       montelukast (SINGULAIR) 10 MG tablet Take 2 tablets (20 mg) by mouth 2 times daily 120 tablet 11     naloxone (NARCAN) nasal spray Spray 1 spray (4 mg) into one nostril alternating nostrils as needed for opioid reversal every 2-3 minutes until assistance arrives 0.2 mL 0     OLANZapine (ZYPREXA) 5 MG tablet Take 1 tablet (5 mg) by mouth At Bedtime 7 tablet 0     olopatadine (PATANOL) 0.1 % ophthalmic solution Apply 1 drop to eye       ondansetron (ZOFRAN-ODT) 8 MG ODT tab Take 1 tablet (8 mg) by mouth every 8 hours as needed 30 tablet 0     order for DME Compression socks - knee high 20-30 mmHg zippered if possible 1 Units 11     polyethylene glycol (MIRALAX) powder Take 17 g (1 capful) by mouth daily 510 g 0     Probiotic Product (PROBIOTIC DAILY PO) Take 1 capsule by mouth daily Lacto acid bifidobacterium       prochlorperazine (COMPAZINE) 10 MG tablet Take 10 mg by mouth as needed  3     ranitidine (ZANTAC) 75 MG tablet Take 1 tablet (75 mg) by mouth 3 times daily 90 tablet 0     senna-docusate (SENOKOT-S;PERICOLACE) 8.6-50 MG per tablet Take 1-2 tablets by mouth 2 times daily as needed for constipation 60 tablet 0     SUMAtriptan (IMITREX) 50 MG tablet Take 1 tablet (50 mg) by mouth as needed for migraine 30 tablet 0     tamoxifen (NOLVADEX) 20 MG tablet Take 1 tablet (20 mg) by mouth daily 90 tablet 1     tiZANidine (ZANAFLEX) 4 MG capsule Take 1 capsule (4 mg) by mouth 3 times daily 90 capsule 0     traMADol (ULTRAM) 50 MG tablet Take 1 tablet (50 mg) by mouth every 6 hours as needed for severe pain 30 tablet 0     triamcinolone (KENALOG) 0.025 % ointment Apply topically as needed  3     Triamcinolone Acetonide (AZMACORT  "IN) Inhale 2 puffs into the lungs as needed       UNABLE TO FIND Take 2 capsules by mouth 3 times daily Muscle Mag. 2 caps contain B1 20mg, B2 20mg, B6 10mg, magesium 20mg, manganese 2mg.       UNABLE TO FIND 3 tablets 3 times daily MEDICATION NAME: calcium D-Glucarate   3 caps contain 180mg of elemental calcium.       UNABLE TO FIND 2 tablets 3 times daily MEDICATION NAME: Digestzymes        UNABLE TO FIND 1 tablet daily MEDICATION NAME: Pure Encapsulations            Allergies   Allergen Reactions     Baclofen Other (See Comments) and Unknown     Other reaction(s): Edema, chest pain, seizures.   Other reaction(s): Chest Pain, Edema, Headache  Seizures     Buprenorphine      Other reaction(s): Chest Pain  Difficulty swallowing, GI cramping     Clonidine Other (See Comments)     \"Seizures\"     Duloxetine Anaphylaxis and Other (See Comments)     Flushing, tremor/muscle twitching and edema  Serotonin syndrome  Other reaction(s): Ataxia  Flushing, tremor/muscle twitching and edema  Serotonin syndrome  SOB palpitations itching rash     Methocarbamol Other (See Comments)     Swelling and chest pain  Other reaction(s): Angioedema  Swelling in chest     No Clinical Screening - See Comments Shortness Of Breath, Palpitations, Anaphylaxis, Itching, Swelling, Difficulty breathing and Rash     Sukhjinder wipes- oral allergy -  July 2015: throat tightness from a Chinese herbal medicine Wilmer Tran  Other reaction(s): Edema, Tongue Swelling  Sukhjinder wipes  Has anaphylactic reactions to varied environmental things.  Carries an epi-pen  Oral allergy syndrome enviromental- food intolerances and animal dander birch trees potatoes carrots cherries celery apple pears plums peaches parsnip kiwi hazelnuts apricots     Carries epi pen     Nuts Shortness Of Breath     Other reaction(s): Throat Swelling/Closing  Please check herbal formulas for these allergens prior to prescribing.     Serotonin Anxiety, Other (See Comments) and Swelling " "    Seizures    Anxiety swelling seizures     Suboxone      Severe chest pain, swelling (face, eyes,feet,legs), All over itching, tightness is throat, fatigue, feels anxious, headache     Amitriptyline Other (See Comments)     Other reaction(s): *Unknown     Amitriptyline Hcl Swelling     Birch Trees      Potatoes, carrots, cherries, celery, apple, pears, plums, peaches, parsnip, kiwi, hazelnuts, and apricots,      Blue Dyes Itching     Headaches    headache       Buprenorphine-Naloxone      Other reaction(s): Chest Pain  Difficulty swallowing, GI cramping     Codeine Nausea     Vomiting       Cymbalta Other (See Comments)     Flushing, tremor/muscle twitching and edema     Gabapentin Other (See Comments)     edema  Systemic edema, weaned off from Feb to March per Dr. Dowd.    edema  Other reaction(s): Edema  edema  Systemic edema, weaned off from Feb to March per Dr. Dowd.       Grass      Hydroxyzine      Per patient \"Headache, chest pounding/pressure, shivering/tremors, goosebumps, sweating, swelling in feet and legs, agitation, back pain, joint pain, rigidity of muscles in fingers/wrist/feet, vision, pins/needles whole body-emili face and arms, rapid heart beat, nausea/vomiting, painful urination, itching, dry mouth\"     Metaxalone      Other reaction(s): Vomiting     Mugwort [Artemisia Vulgaris]      Various spices     Pollen Extract      Other reaction(s): *Unknown  Mugwort, ragweed's melons bananas cucumbers zucchini     Pregabalin      Ragweeds      Melons, bananas, cucumbers, zucchini.     Topamax      Topiramate      Other reaction(s): Ataxia  seotonin syndrome       Nortriptyline Itching, Visual Disturbance, Swelling, GI Disturbance, Anxiety, Other (See Comments) and Nausea     Other reaction(s): Swelling  Other reaction(s): Edema, Headache  Other reaction(s): Swelling       PHYSICAL EXAMINATION  BP (!) 156/89 (BP Location: Left arm, Patient Position: Chair, Cuff Size: Adult Regular)   Pulse 116   " Temp 98.9  F (37.2  C) (Oral)   Wt 85.4 kg (188 lb 4.8 oz)   SpO2 97%   BMI 31.33 kg/m     Oxygen decreased to 90-01% after walking. Tachycardic to 130 with slow ambulation.  Constitutional: Alert, oriented female in no visible distress.  Eyes: PERRL. Anicteric sclerae.  ENT/Mouth: OM moist and pink without lesions or thrush.  CV: RRR, no murmurs appreciated. midly tachycardic  Resp: CTAB throughout  Abdomen: Soft, non-distended. Bowel sounds present. No masses appreciated. She has discomfort in epigastric area without rebound.  Extremities: R>L sockline edema extremity edema appreciated.  Skin: Warm, dry. No bruising or petechiae noted.  Lymph: No cervical or supraclavicular lymphadenopathy appreciated.   Neuro: CN II-XII grossly intact.    LABS:  Results for orders placed or performed in visit on 10/11/19 (from the past 24 hour(s))   *CBC with platelets differential   Result Value Ref Range    WBC 4.6 4.0 - 11.0 10e9/L    RBC Count 4.38 3.8 - 5.2 10e12/L    Hemoglobin 14.5 11.7 - 15.7 g/dL    Hematocrit 43.3 35.0 - 47.0 %    MCV 99 78 - 100 fl    MCH 33.1 (H) 26.5 - 33.0 pg    MCHC 33.5 31.5 - 36.5 g/dL    RDW 11.7 10.0 - 15.0 %    Platelet Count 206 150 - 450 10e9/L    Diff Method Automated Method     % Neutrophils 65.8 %    % Lymphocytes 21.3 %    % Monocytes 11.7 %    % Eosinophils 0.4 %    % Basophils 0.4 %    % Immature Granulocytes 0.4 %    Nucleated RBCs 0 0 /100    Absolute Neutrophil 3.0 1.6 - 8.3 10e9/L    Absolute Lymphocytes 1.0 0.8 - 5.3 10e9/L    Absolute Monocytes 0.5 0.0 - 1.3 10e9/L    Absolute Eosinophils 0.0 0.0 - 0.7 10e9/L    Absolute Basophils 0.0 0.0 - 0.2 10e9/L    Abs Immature Granulocytes 0.0 0 - 0.4 10e9/L    Absolute Nucleated RBC 0.0    Uric acid   Result Value Ref Range    Uric Acid 7.0 (H) 2.6 - 6.0 mg/dL   Lactate Dehydrogenase   Result Value Ref Range    Lactate Dehydrogenase 232 81 - 234 U/L   Comprehensive metabolic panel   Result Value Ref Range    Sodium 138 133 - 144 mmol/L     Potassium 3.8 3.4 - 5.3 mmol/L    Chloride 103 94 - 109 mmol/L    Carbon Dioxide 29 20 - 32 mmol/L    Anion Gap 6 3 - 14 mmol/L    Glucose 114 (H) 70 - 99 mg/dL    Urea Nitrogen 16 7 - 30 mg/dL    Creatinine 0.84 0.52 - 1.04 mg/dL    GFR Estimate 76 >60 mL/min/[1.73_m2]    GFR Estimate If Black 88 >60 mL/min/[1.73_m2]    Calcium 8.5 8.5 - 10.1 mg/dL    Bilirubin Total 0.5 0.2 - 1.3 mg/dL    Albumin 3.9 3.4 - 5.0 g/dL    Protein Total 7.4 6.8 - 8.8 g/dL    Alkaline Phosphatase 70 40 - 150 U/L    ALT 25 0 - 50 U/L    AST 18 0 - 45 U/L   **Iron and iron binding capacity FUTURE anytime   Result Value Ref Range    Iron 101 35 - 180 ug/dL    Iron Binding Cap 442 (H) 240 - 430 ug/dL    Iron Saturation Index 23 15 - 46 %     *Note: Due to a large number of results and/or encounters for the requested time period, some results have not been displayed. A complete set of results can be found in Results Review.         IMPRESSION/PLAN:  Tisha Arias is a 59 year old female with history of a stage I, ER/OH positive, HER2-negative right breast cancer in 2011, s/p bilateral mastectomy and BENJIE/BSO, now on adjuvant endocrine therapy, Thyroid cancer s/p resection and radioiodine ablation, and DLBCL, treated with 6 cycles of R-DA-EPOCH completed January 2018, now on surveillance.  She is here today on an add-on basis for multiple symptoms.    Elvin is having an array of symptoms, all of which started 3 weeks or so ago, and have been progressively worsening. This includes bilateral blurry vision, occipital headaches, hot flashes/sweats, mild nausea, decreased intake, mild bilateral leg swelling, and pain involving multiple foci throughout the appendicular and axial skeleton.    Her lab work is grossly unremarkable. LDH is normal. Uric acid is stably elevated, so unlikely to be causing any of her new symptoms. Exam is unremarkable with the exception of trace sock line edema, though R > L. I did ambulate her in clinic and with  slow ambulation, her HR increased to 130, improved briskly upon rest to ~100. She maintained her saturations int he 95-97% range with ambulation, but briefly desaturated to low 90s (toro 90) post-ambulation with a recovery to baseline over several minutes.    I discussed with Elvin that there are various etiologies for her symptoms. It is unclear to me if there is one umbrella diagnosis that is causing all of her symptoms, or if there are multiple things contributing to how poorly she is feeling. Given she has been on Tamoxifen, there is a concern for VTE,particularly given her pleuritc pain, tachycardia, and desaturation post-ambulation. Recurrent lymphoma is also on the DDx. Ultimately, I think we need to rule out a PE first, and given that it is late on a Friday and we are unable to do all of the imaging tonight, I recommended that we proceed with a CT PE now, and then return early next week for a CT neck, abdomen, pelvis.    She then asked me what I would do to help with her pain. She is currently on Tylenol every 4 hours with no relief. SHe reports she cannot take NSAIDs and wants something stronger. I discussed with Elvin that I will not prescribe opiates at this time given that I don't know what I am treating, for one, and second, I would not want to start her on opiates now given how hard she has worked to get off of long term narcotics. She become very upset with this and the trajectory of the visit become more tense. She asked why I would let her be in pain all weekend while we await all of the testing. I did recommend the emergency room given her severe pain, to which she declined multiple times.    Ultimately, she was agreeable to get the CT PE today, understanding that if she does have a blood clot, I will need to direct her to the emergency room given her symptoms and severe pain. We will plan to obtain the further imaging (CT neck, A/P) as soon as possible on a later date. I still don't know what is  causing her blurry vision, and ultimately we may need a brain MRI.    The remainder of the plan is as follows:    DLBCL: Currently in surveillance, last saw. Dr. Sauceda July 2019 with plan for follow-up in 6 months. Most recent imaging with CT CAP 7/1/19 was without evidence of recurrence of disease. Surveillence imaging PRN. Her cancer was initially diagnosed in setting of pain at similar sites as having presently, so imaging as above.    History of breast cancer: She has been on adjuvant endocrine therapy since 02/2012. With reports of muscle cramping earlier this week, her Tamoxifen was placed on hold. No change in above symptoms with holding, yet.    Blurry vision: Serrato use can cause cataracts, though she reports this was ruled out by her eye doctor. She is having occipital headaches, otherwise no focal neuro symptoms. Will start with imaging as above and then consider brain MRI.    Papillary thyroid cancer: Follows with Dr. Castro.    Pain control: She has complex pain following mastectomies. She did see Dr. Liliana Ma in Pain medicine, at which time she was started on an opioid taper given dependence on the medication. She no longer follows with Dr. Benitez. FOr her pains, she is currently on celebrex 200 mg twice daily, flexeril 5 mg TID. This has been seeing her primary care provider for pain. Continue Tylenol for current pains. If indeed there is e/o cancer on her scans and stronger pain control is needed, we will likely need to enlist the assistance of either palliative medicine or pain medicine.        I spent >40 minutes with the patient, with over 50% of the time spent counseling or coordinating their care as described above.      Evelia Johns PA-C  Hematology, Oncology, and Transplant  [facility]  [address] [place] [id number]  [phone]    Addendum: CT PE negative per prelim.    Message sent to scheduling to arrange CT neck, CAP for early next week. Spoke with Elvin  "on the phone who continued to voice frustration. She expressed \"don't you care about my eyes.\" I explained to her that we need to triage what we are doing and right now she is havnig severe pains in her body that need to be addressed. I did ask her again to go to ER if pains, visual symptoms, etc are worsening. ECT    Addendum II: October 15, 2019    CT neck, CT CAP all negative for any metastatic disease or recurrence of lymphoma. I have no clear etiology for her pain.    I had spoke with Dr. Crawley regarding this result, and we mutually agreed to recommend to continue holding Tamoxifen until January visit with Dr. Crawley, and to return to PCP for ongoing work-up, if needed, and management of her pain.    I called Elvin to relay this message. I explained to Elvin that fortunately there is no evidence of cancer, but that I do not have an explanation for her pain. She became verbally upset. She now is reporting that the pain is secondary to \"tamoxifen hypersensitivity.\" I told her that I am not familiar of this condition, and that in my own experience, I've never had hypersensitivity to Tamoxifen manifest in this manner. If truly from Tamoxifen, I described that symptoms tend to improve off of the medication and I would expect that for her. I offered to re-discuss with Dr. Crawley given her expertise with the medication.     She then started accusing me of several things, which I have recorded. \"You sent me to [facility] Ma who threw a diagnosis on me\", \"You're not hearing me because I apparently can't speak on your level, I have done hours of literature review\". She accused me of not listening to her, not believing her pain, and told me \"I'm going through Hell because of you\". I again explained that I am sympathetic to her pain and I believe she has real pain, but that because this is not cancer related, and given her history, I am, myself, unable to prescribe her narcotics, to which she told me, \"find someone who can.\" " She began referencing things that have occurred to her in the last few years regarding opiates and her chronic pain, which I again told her, I have had no part in, and that I cannot give her narcotics but that I would be happy to speak with Dr. Crawley. She then started accusing that there was misinformation in her AVS. I looked at the AVS and I did not supply any patient instructions, as we left the visit with her going to imaging and followed up by phone after the visit. I described to her that often the AVS lists diagnosis codes that were associated with any labwork or imaging ordered, and after looking through her AVS, I suspect this is the case. She then accused me of not filling out an AVS.    I again apologized to Elvin for her frustrations and that I understand how frustrating it would be to be in her situation. She was agreeable to Dr. Crawley calling her. She said to make sure she does not leave a VM and wants to speak in person. She then hung up.    ECT

## 2019-10-14 ENCOUNTER — PATIENT OUTREACH (OUTPATIENT)
Dept: ONCOLOGY | Facility: CLINIC | Age: 59
End: 2019-10-14

## 2019-10-14 ENCOUNTER — ANCILLARY PROCEDURE (OUTPATIENT)
Dept: CT IMAGING | Facility: CLINIC | Age: 59
End: 2019-10-14
Attending: PHYSICIAN ASSISTANT
Payer: COMMERCIAL

## 2019-10-14 DIAGNOSIS — C83.30 DIFFUSE LARGE B-CELL LYMPHOMA, UNSPECIFIED BODY REGION (H): ICD-10-CM

## 2019-10-14 DIAGNOSIS — G89.29 OTHER CHRONIC PAIN: ICD-10-CM

## 2019-10-14 LAB
IGA SERPL-MCNC: 110 MG/DL (ref 70–380)
IGG SERPL-MCNC: 791 MG/DL (ref 695–1620)
IGM SERPL-MCNC: 33 MG/DL (ref 60–265)

## 2019-10-14 RX ORDER — IOPAMIDOL 755 MG/ML
200 INJECTION, SOLUTION INTRAVASCULAR ONCE
Status: COMPLETED | OUTPATIENT
Start: 2019-10-14 | End: 2019-10-14

## 2019-10-14 RX ADMIN — IOPAMIDOL 115 ML: 755 INJECTION, SOLUTION INTRAVASCULAR at 17:19

## 2019-10-14 NOTE — PROGRESS NOTES
Spoke to Elvin to ensure she received the 2 VMM left with time for her CT scan. She agrees to come at 5:00 as scheduled.

## 2019-10-14 NOTE — PROGRESS NOTES
ELIECER with time for CT scan tonight at 5:00. Left direct phone number to the department in case she needs to change the time.

## 2019-10-15 ENCOUNTER — COMMUNICATION - HEALTHEAST (OUTPATIENT)
Dept: PALLIATIVE MEDICINE | Facility: CLINIC | Age: 59
End: 2019-10-15

## 2019-10-16 ENCOUNTER — TELEPHONE (OUTPATIENT)
Dept: ONCOLOGY | Facility: CLINIC | Age: 59
End: 2019-10-16

## 2019-10-16 ENCOUNTER — TELEPHONE (OUTPATIENT)
Dept: FAMILY MEDICINE | Facility: CLINIC | Age: 59
End: 2019-10-16

## 2019-10-16 LAB — 1,25(OH)2D SERPL-MCNC: 42.7 PG/ML (ref 19.9–79.3)

## 2019-10-16 NOTE — TELEPHONE ENCOUNTER
"I called Elvin last evening to discuss her pain.  She was not available; I left her a message.    I called Elvin this evening and spoke with her.  She complained of 10/10 pain throughout all of her bones as well as her chest.      She feels like all of her pain stemmed from starting tamoxifen, and was exacerbated by the other medications.  I explained to her that I do not think her 10/10 pain is from tamoxifen.  Her CT does not show concerning findings for recurrent cancer either.  We reviewed the CT demonstrates a kidney stone.  This was previously seen and is now farther in the ureter.  I will forward the scan to her urologist for consideration.    I advised she take tylenol and celebrex.  I did not think narcotics were appropriate.      She expressed frustration that no one was hearing her complain of pain.  She expressed disappointment in her care team here including me that \"I don't listen to patients, and all of this is my fault.\"      She has reached out to the pain clinic and her PCP.      She has follow up with oncology in January.    Shahla Crawley MD  "

## 2019-10-16 NOTE — TELEPHONE ENCOUNTER
Reason for Call:  Other call back    Detailed comments: Wants to go over her consult she had with Oncologist. Wants to speak with Dr. Soto only.      Phone Number Patient can be reached at: Home number on file [phone] (home)    Best Time: any    Can we leave a detailed message on this number? YES    Call taken on 10/16/2019 at 12:32 PM by Alonzo Tello

## 2019-10-17 ENCOUNTER — MYC MEDICAL ADVICE (OUTPATIENT)
Dept: ONCOLOGY | Facility: CLINIC | Age: 59
End: 2019-10-17

## 2019-10-17 ENCOUNTER — PATIENT OUTREACH (OUTPATIENT)
Dept: ONCOLOGY | Facility: CLINIC | Age: 59
End: 2019-10-17

## 2019-10-17 DIAGNOSIS — D05.10 DUCTAL CARCINOMA IN SITU (DCIS) OF BREAST, UNSPECIFIED LATERALITY: Primary | ICD-10-CM

## 2019-10-17 DIAGNOSIS — E87.6 HYPOKALEMIA: ICD-10-CM

## 2019-10-17 NOTE — PROGRESS NOTES
I called Elvin on behalf of the palliative care team. She would like to be seen in palliative care again, and I let her know I would reach out to the team to get her scheduled.    Emma Aquino, MSN, RN, OCN  Patient Care Supervisor  [facility]

## 2019-10-18 ENCOUNTER — VIRTUAL VISIT (OUTPATIENT)
Dept: FAMILY MEDICINE | Facility: CLINIC | Age: 59
End: 2019-10-18
Payer: COMMERCIAL

## 2019-10-18 DIAGNOSIS — D05.10 DUCTAL CARCINOMA IN SITU (DCIS) OF BREAST, UNSPECIFIED LATERALITY: ICD-10-CM

## 2019-10-18 DIAGNOSIS — F41.1 GAD (GENERALIZED ANXIETY DISORDER): Primary | ICD-10-CM

## 2019-10-18 DIAGNOSIS — Z85.3 PERSONAL HISTORY OF MALIGNANT NEOPLASM OF BREAST: ICD-10-CM

## 2019-10-18 DIAGNOSIS — R60.0 LOCALIZED EDEMA: ICD-10-CM

## 2019-10-18 DIAGNOSIS — M79.18 MYOFASCIAL PAIN: ICD-10-CM

## 2019-10-18 DIAGNOSIS — C85.10 HIGH GRADE B-CELL LYMPHOMA (H): ICD-10-CM

## 2019-10-18 DIAGNOSIS — G62.0 DRUG-INDUCED POLYNEUROPATHY (H): ICD-10-CM

## 2019-10-18 DIAGNOSIS — R07.89 CHEST WALL PAIN: ICD-10-CM

## 2019-10-18 DIAGNOSIS — C73 PAPILLARY CARCINOMA, FOLLICULAR VARIANT (H): ICD-10-CM

## 2019-10-18 PROCEDURE — 99441 ZZC PHYSICIAN TELEPHONE EVALUATION 5-10 MIN: CPT | Performed by: FAMILY MEDICINE

## 2019-10-18 RX ORDER — DIAZEPAM 5 MG
5 TABLET ORAL
Qty: 20 TABLET | Refills: 0 | Status: SHIPPED | OUTPATIENT
Start: 2019-10-18 | End: 2021-08-11

## 2019-10-18 RX ORDER — CYCLOBENZAPRINE HCL 10 MG
10 TABLET ORAL 3 TIMES DAILY PRN
Qty: 30 TABLET | Refills: 2 | Status: SHIPPED | OUTPATIENT
Start: 2019-10-18 | End: 2021-08-11

## 2019-10-18 RX ORDER — LIDOCAINE 50 MG/G
OINTMENT TOPICAL
Qty: 350 G | Refills: 1 | Status: SHIPPED | OUTPATIENT
Start: 2019-10-18

## 2019-10-18 NOTE — TELEPHONE ENCOUNTER
Please assist with scheduling phone visit or office visit. Thanks.    Elvira Enamorado RN  [facility]

## 2019-10-18 NOTE — TELEPHONE ENCOUNTER
Spoke to patient and made appointment with dr. ramirez for today  Patrick Tello CMA on 10/18/2019 at 11:46 AM

## 2019-10-18 NOTE — PROGRESS NOTES
"Tisha Arias is a 59 year old female who is being evaluated via a telephone visit.      The patient has been notified of following (by TONY Dias MA       \"We have found that certain health care needs can be provided without the need for a physical exam.  This service lets us provide the care you need with a short phone conversation.  If a prescription is necessary we can send it directly to your pharmacy.  If lab work is needed we can place an order for that and you can then stop by our lab to have the test done at a later time.    This telephone visit will be conducted via 3 way call with the you (the patient) , the physician/provider, and a me all on the line at the same time.  This allows your physician/provider to have the phone conversation with you while I will be taking notes for your medical record.  We will have full access to your [facility] medical record during this entire phone call.    Since this is like an office visit,  will bill your insurance company for this service.  Please check with your medical insurance if this type of telephone/virtual is covered . You may be responsible for the cost of this service if insurance coverage is denied.  The typical cost is $30 (10min), $59(11-20min) and $85 (21-30min)     If during the course of the call the physician/provider feels a telephone visit is not appropriate, you will not be charged for this service\"    Consent has been obtained for this service by care team member: yes.  See the scanned image in the medical record.    S: The history as provided by the patient to the provider during this 3 way call include   Chronic pain - attributed to breast cancer surgery  Following up with pain specialist however expresses that this appointment is too far out to have no pain medications  Requests hydrocodone and states that nothing else has been effective  She has been to several pain specialist and was weaned off of all medications    Pertinent " parts of the the patient's medical history reviewed and confirmed by the provider included :      Total time of call between patient and provider was 5 minutes     Pablo Zarco MD  (MD signature)  ===================================================    I have reviewed the note as documented above.  This accurately captures the substance of my conversation with the patient,    Additional provider notes:    Assessment/Plan:    ICD-10-CM    1. SENG (generalized anxiety disorder) F41.1 diazepam (VALIUM) 5 MG tablet   2. High grade B-cell lymphoma (H) C85.10    3. Chest wall pain R07.89 lidocaine (XYLOCAINE) 5 % external ointment     cyclobenzaprine (FLEXERIL) 10 MG tablet   4. Localized edema R60.0 lidocaine (XYLOCAINE) 5 % external ointment   5. 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 Papillary carcinoma, follicular variant (H) C73 lidocaine (XYLOCAINE) 5 % external ointment   7. Personal history of malignant neoplasm of breast Z85.3 lidocaine (XYLOCAINE) 5 % external ointment   8. Myofascial pain M79.18 diclofenac (VOLTAREN) 1 % topical gel     cyclobenzaprine (FLEXERIL) 10 MG tablet   9. Drug-induced polyneuropathy (H) G62.0      Discussed that it is no longer appropriate for me to prescribe opiate medications to her and that she needs to follow-up with her pain specialist.  Will refill muscle relaxer, topical treatment to use in the meantime  Will refill non-opiate medications  Patient was very unhappy with this decision  Discussed behavior contract as she has yelled at clinic staff over the phone several times, she agreed to sign this upon her return to clinic.    Pablo Zarco MD

## 2019-10-18 NOTE — TELEPHONE ENCOUNTER
"Spoke with patient regarding phone visit today and went over consent form. Patient then raised her voice and said \"I dont understand why I have to do this consent form when all I am doing is doing a consult and I cant come in and I want to do this before Monday\" MA then informed patient \"We are needing to do this consent for because this is a telephone visit with the provider\". Then informed patient of the prices with the minutes patient then got upset and verbalized \"I dont understand why I can be on the phone with the Oncologist for over an hour and not have to pay but being on the phone with family medicine and I have to pay to talk to him all I want to talk with him about is what the Oncologist\" MA then informed patient \"I'm unsure how the Oncology clinic does telephone visits but in the clinic here with a telephone visit it is like an office visit but over the phone\". Patient then raised her voice again and said \"why wasn't this taken care of 2 days ago when I called\" MA informed \"provider was not in clinic 2 days ago\" Patient then interrupted \"what about yesterday\" MA answered \"Provider was looking at messages and trying to answer messages with also seeing patients and it was able to be addressed today\" Patient then verbalized how upset she was that she would have to pay for this visit when it is a consultation. Patient also then said \"if I get charged for this visit I will raise hell to the insurance company\" MA then said \"I would then call your insurance and ask if this is covered. MA then asked patient if after 2:00 works patient agreed.     MA then informed provider of conversation. Nelly Dias MA    "

## 2019-10-20 RX ORDER — POTASSIUM CHLORIDE 1500 MG/1
TABLET, EXTENDED RELEASE ORAL
Qty: 60 TABLET | Refills: 3 | Status: SHIPPED | OUTPATIENT
Start: 2019-10-20 | End: 2020-02-10

## 2019-10-21 ENCOUNTER — PATIENT OUTREACH (OUTPATIENT)
Dept: ONCOLOGY | Facility: CLINIC | Age: 59
End: 2019-10-21

## 2019-10-21 NOTE — PROGRESS NOTES
I called Elvin to discuss her appointment scheduled with Dr. Benitez on 11/6 and expectations of communication with her care team. I was unable to reach Elvin and I did not leave a message. Will attempt another call tomorrow.    Emma Aquino, MSN, RN, OCN  Patient Care Supervisor  [facility]

## 2019-10-22 ENCOUNTER — RECORDS - HEALTHEAST (OUTPATIENT)
Dept: ADMINISTRATIVE | Facility: OTHER | Age: 59
End: 2019-10-22

## 2019-10-22 ENCOUNTER — PATIENT OUTREACH (OUTPATIENT)
Dept: ONCOLOGY | Facility: CLINIC | Age: 59
End: 2019-10-22

## 2019-10-22 NOTE — PROGRESS NOTES
I spoke to Elvin and let her know about her appointment with Dr. Benitez, but also that we would be putting a behavior agreement in place by recommendation of her care team. She requested a copy of it to review, and I let her know I would send one to her after it was drafted.    Emma Aquino RN

## 2019-10-23 ENCOUNTER — HOSPITAL ENCOUNTER (OUTPATIENT)
Dept: PALLIATIVE MEDICINE | Facility: OTHER | Age: 59
Discharge: HOME | End: 2019-10-23
Attending: ANESTHESIOLOGY

## 2019-10-23 ENCOUNTER — COMMUNICATION - HEALTHEAST (OUTPATIENT)
Dept: TELEHEALTH | Facility: CLINIC | Age: 59
End: 2019-10-23

## 2019-10-23 DIAGNOSIS — G89.4 CHRONIC PAIN SYNDROME: ICD-10-CM

## 2019-10-23 ASSESSMENT — MIFFLIN-ST. JEOR: SCORE: 1411.74

## 2019-10-25 DIAGNOSIS — Z85.3 PERSONAL HISTORY OF MALIGNANT NEOPLASM OF BREAST: Primary | ICD-10-CM

## 2019-10-25 DIAGNOSIS — C73 PAPILLARY CARCINOMA, FOLLICULAR VARIANT (H): ICD-10-CM

## 2019-10-25 LAB
6MAM UR QL: NOT DETECTED
7AMINOCLONAZEPAM UR QL: NOT DETECTED
A-OH ALPRAZ UR QL: NOT DETECTED
ALPRAZ UR QL: NOT DETECTED
AMPHET UR QL SCN: NOT DETECTED
BARBITURATES UR QL: NOT DETECTED
BUPRENORPHINE UR QL: NOT DETECTED
BZE UR QL: NOT DETECTED
CARBOXYTHC UR QL: NOT DETECTED
CARISOPRODOL UR QL: NOT DETECTED
CLONAZEPAM UR QL: NOT DETECTED
CODEINE UR QL: NOT DETECTED
CREAT UR-MCNC: 165.2 MG/DL (ref 20–400)
DIAZEPAM UR QL: NOT DETECTED
ETHYL GLUCURONIDE UR QL: NOT DETECTED
FENTANYL UR QL: NOT DETECTED
HYDROCODONE UR QL: NOT DETECTED
HYDROMORPHONE UR QL: NOT DETECTED
LORAZEPAM UR QL: NOT DETECTED
MDA UR QL: NOT DETECTED
MDEA UR QL: NOT DETECTED
MDMA UR QL: NOT DETECTED
ME-PHENIDATE UR QL: NOT DETECTED
MEPERIDINE UR QL: NOT DETECTED
METHADONE UR QL: NOT DETECTED
METHAMPHET UR QL: NOT DETECTED
MIDAZOLAM UR QL SCN: NOT DETECTED
MORPHINE UR QL: NOT DETECTED
NORBUPRENORPHINE UR QL CFM: NOT DETECTED
NORDIAZEPAM UR QL: PRESENT
NORFENTANYL UR QL: NOT DETECTED
NORHYDROCODONE UR QL CFM: NOT DETECTED
NOROXYCODONE UR QL CFM: NOT DETECTED
NOROXYMORPHONE UR QL SCN: NOT DETECTED
OXAZEPAM UR QL: PRESENT
OXYCODONE UR QL: NOT DETECTED
OXYMORPHONE UR QL: NOT DETECTED
PATHOLOGY STUDY: NORMAL
PCP UR QL: NOT DETECTED
PHENTERMINE UR QL: NOT DETECTED
PROPOXYPH UR QL: NOT DETECTED
SERVICE CMNT-IMP: NORMAL
TAPENTADOL UR QL SCN: NOT DETECTED
TAPENTADOL UR QL SCN: NOT DETECTED
TEMAZEPAM UR QL: PRESENT
TRAMADOL UR QL: PRESENT
ZOLPIDEM UR QL: NOT DETECTED

## 2019-10-25 NOTE — PROGRESS NOTES
Spoke to Elvin who only wants to speak to Dr Crawley about her symptoms.She repeatedly stated that Dr Crawley has not called her, she has caller ID and can see who calls her. Writer stated that Dr Crawley has attempted on several occasions to reach her prior to leaving [facility]. Elvin asked for a copy of the my chart message that she sent  To Dr Crawley which prompted Dr Crawley to try and contact her.   Copy of the message sent and Dr Crawley notified of rich.

## 2019-10-29 ENCOUNTER — ANCILLARY PROCEDURE (OUTPATIENT)
Dept: ULTRASOUND IMAGING | Facility: CLINIC | Age: 59
End: 2019-10-29
Payer: COMMERCIAL

## 2019-10-29 DIAGNOSIS — E89.2 POSTSURGICAL HYPOPARATHYROIDISM (H): ICD-10-CM

## 2019-10-29 DIAGNOSIS — E83.51 HYPOCALCEMIA: ICD-10-CM

## 2019-10-29 DIAGNOSIS — C73 PAPILLARY CARCINOMA, FOLLICULAR VARIANT (H): ICD-10-CM

## 2019-10-29 DIAGNOSIS — E89.0 POSTSURGICAL HYPOTHYROIDISM: ICD-10-CM

## 2019-10-29 LAB
CA-I SERPL ISE-MCNC: 4.6 MG/DL (ref 4.4–5.2)
CALCIUM SERPL-MCNC: 8.6 MG/DL (ref 8.5–10.1)
CREAT SERPL-MCNC: 0.76 MG/DL (ref 0.52–1.04)
GFR SERPL CREATININE-BSD FRML MDRD: 86 ML/MIN/{1.73_M2}
PHOSPHATE SERPL-MCNC: 3.6 MG/DL (ref 2.5–4.5)
T4 FREE SERPL-MCNC: 1.57 NG/DL (ref 0.76–1.46)
TSH SERPL DL<=0.005 MIU/L-ACNC: <0.01 MU/L (ref 0.4–4)

## 2019-10-29 PROCEDURE — 84443 ASSAY THYROID STIM HORMONE: CPT

## 2019-10-29 PROCEDURE — 82565 ASSAY OF CREATININE: CPT

## 2019-10-29 PROCEDURE — 82330 ASSAY OF CALCIUM: CPT

## 2019-10-29 PROCEDURE — 86800 THYROGLOBULIN ANTIBODY: CPT

## 2019-10-29 PROCEDURE — 00000344 ZZHCL STATISTIC REMEASURE THYROGLOBULIN

## 2019-10-29 PROCEDURE — 84439 ASSAY OF FREE THYROXINE: CPT

## 2019-10-29 PROCEDURE — 25000128 H RX IP 250 OP 636

## 2019-10-29 PROCEDURE — 84432 ASSAY OF THYROGLOBULIN: CPT

## 2019-10-29 PROCEDURE — 82310 ASSAY OF CALCIUM: CPT

## 2019-10-29 PROCEDURE — 84100 ASSAY OF PHOSPHORUS: CPT

## 2019-10-29 RX ORDER — HEPARIN SODIUM (PORCINE) LOCK FLUSH IV SOLN 100 UNIT/ML 100 UNIT/ML
5 SOLUTION INTRAVENOUS EVERY 8 HOURS
Status: DISCONTINUED | OUTPATIENT
Start: 2019-10-29 | End: 2019-11-06 | Stop reason: HOSPADM

## 2019-10-29 RX ADMIN — HEPARIN 5 ML: 100 SYRINGE at 12:06

## 2019-10-29 NOTE — NURSING NOTE
Chief Complaint   Patient presents with     Port Draw     Labs drawn via PORT by RN in lab. Line flushed with saline and heparin. Lab appt only.      Gerri Andres RN

## 2019-10-31 ENCOUNTER — PATIENT OUTREACH (OUTPATIENT)
Dept: ONCOLOGY | Facility: CLINIC | Age: 59
End: 2019-10-31

## 2019-10-31 NOTE — PROGRESS NOTES
I called Elvin to let her know that we would not be able to share the behavior agreement with her before her clinic appointment due to the intention of the meeting and it still being in process. I left a voicemail with this information, and also let her know I would be out of the office next week.    Emma Aquino RN

## 2019-11-01 ENCOUNTER — HOSPITAL ENCOUNTER (OUTPATIENT)
Dept: MRI IMAGING | Facility: CLINIC | Age: 59
Discharge: HOME | End: 2019-11-01
Attending: INTERNAL MEDICINE | Admitting: INTERNAL MEDICINE
Payer: COMMERCIAL

## 2019-11-01 ENCOUNTER — RESULTS ONLY (OUTPATIENT)
Dept: LAB | Age: 59
End: 2019-11-01

## 2019-11-01 ENCOUNTER — NURSE TRIAGE (OUTPATIENT)
Dept: UROLOGY | Facility: CLINIC | Age: 59
End: 2019-11-01

## 2019-11-01 DIAGNOSIS — Z00.6 EXAMINATION OF PARTICIPANT OR CONTROL IN CLINICAL RESEARCH: ICD-10-CM

## 2019-11-01 LAB — HCT VFR BLD AUTO: 41.1 % (ref 35–47)

## 2019-11-01 PROCEDURE — A9585 GADOBUTROL INJECTION: HCPCS | Performed by: INTERNAL MEDICINE

## 2019-11-01 PROCEDURE — 25000128 H RX IP 250 OP 636: Performed by: INTERNAL MEDICINE

## 2019-11-01 PROCEDURE — 25500064 ZZH RX 255 OP 636: Performed by: INTERNAL MEDICINE

## 2019-11-01 PROCEDURE — 75561 CARDIAC MRI FOR MORPH W/DYE: CPT

## 2019-11-01 PROCEDURE — 75561 CARDIAC MRI FOR MORPH W/DYE: CPT | Performed by: INTERNAL MEDICINE

## 2019-11-01 RX ORDER — LIDOCAINE 40 MG/G
CREAM TOPICAL
Status: DISCONTINUED | OUTPATIENT
Start: 2019-11-01 | End: 2019-11-02 | Stop reason: HOSPADM

## 2019-11-01 RX ORDER — GADOBUTROL 604.72 MG/ML
10 INJECTION INTRAVENOUS ONCE
Status: COMPLETED | OUTPATIENT
Start: 2019-11-01 | End: 2019-11-01

## 2019-11-01 RX ORDER — HEPARIN SODIUM,PORCINE 10 UNIT/ML
5-10 VIAL (ML) INTRAVENOUS
Status: DISCONTINUED | OUTPATIENT
Start: 2019-11-01 | End: 2019-11-02 | Stop reason: HOSPADM

## 2019-11-01 RX ORDER — HEPARIN SODIUM,PORCINE 10 UNIT/ML
5-10 VIAL (ML) INTRAVENOUS EVERY 24 HOURS
Status: DISCONTINUED | OUTPATIENT
Start: 2019-11-01 | End: 2019-11-02 | Stop reason: HOSPADM

## 2019-11-01 RX ORDER — HEPARIN SODIUM (PORCINE) LOCK FLUSH IV SOLN 100 UNIT/ML 100 UNIT/ML
5 SOLUTION INTRAVENOUS
Status: DISCONTINUED | OUTPATIENT
Start: 2019-11-01 | End: 2019-11-02 | Stop reason: HOSPADM

## 2019-11-01 RX ADMIN — Medication 5 ML: at 11:27

## 2019-11-01 RX ADMIN — GADOBUTROL 10 ML: 604.72 INJECTION INTRAVENOUS at 11:40

## 2019-11-01 NOTE — TELEPHONE ENCOUNTER
Spoke with patient. Reports she has a hx of kidney stones and had ESWL with Dr. Graham and had stent removed on 9/3/2019. Reports she has continued to have some dull R flank pain for the past couple of weeks but the last 1-2 hours the pain has intensified and feels like a sharp tugging on R side down to groin. Reports the pain is severe.   Reports pain with urination and nausea.   Denies fever, hematuria, vomiting.   Had a CT scan on 10/14/19, 7 x 6 mm stone in the distal right ureter, previously in the proximal ureter.  Paged Dr. Graham. Then paged urology MA. She states Dr. Graham is in surgery today and advised her to go to ED.   Called patient and gave information. She agreed.    Additional Information    Negative: Fever > 100.5 F (38.1 C)    Side (flank) or lower back pain present    Negative: Shock suspected (e.g., cold/pale/clammy skin, too weak to stand, low BP, rapid pulse)    Negative: Sounds like a life-threatening emergency to the triager    Negative: Unable to urinate (or only a few drops) and bladder feels very full    Negative: Vomiting    Negative: Patient sounds very sick or weak to the triager    Severe pain with urination    Protocols used: URINATION PAIN - FEMALE-A-OH    Ariadna Woods RN

## 2019-11-01 NOTE — TELEPHONE ENCOUNTER
Reason for call:  Patient reporting a symptom    Symptom or request: Red Flag    Sharp, stabbing pain in right mid-back and flank area, travels down to her bladder, and nauseated    Duration (how long have symptoms been present): 2 hours    Have you been treated for this before? Yes    Additional comments: please call ASAP!      Phone Number patient can be reached at:  Home number on file [phone] (home)    Best Time:  asap    Can we leave a detailed message on this number:  YES    Call taken on 11/1/2019 at 12:35 PM by Blaire Augustine

## 2019-11-04 ENCOUNTER — COMMUNICATION - HEALTHEAST (OUTPATIENT)
Dept: TELEHEALTH | Facility: CLINIC | Age: 59
End: 2019-11-04

## 2019-11-04 ENCOUNTER — HOSPITAL ENCOUNTER (OUTPATIENT)
Dept: PHARMACY | Facility: OTHER | Age: 59
Discharge: HOME | End: 2019-11-04
Attending: ANESTHESIOLOGY

## 2019-11-04 ENCOUNTER — COMMUNICATION - HEALTHEAST (OUTPATIENT)
Dept: PHARMACY | Facility: OTHER | Age: 59
End: 2019-11-04

## 2019-11-04 DIAGNOSIS — F41.9 ANXIETY: ICD-10-CM

## 2019-11-04 DIAGNOSIS — Z98.890 S/P CYSTOSCOPY: ICD-10-CM

## 2019-11-04 DIAGNOSIS — G89.4 CHRONIC PAIN SYNDROME: ICD-10-CM

## 2019-11-04 DIAGNOSIS — G89.29 OTHER CHRONIC PAIN: ICD-10-CM

## 2019-11-04 PROBLEM — G62.0 DRUG-INDUCED POLYNEUROPATHY (H): Status: ACTIVE | Noted: 2019-11-04

## 2019-11-05 ENCOUNTER — AMBULATORY - HEALTHEAST (OUTPATIENT)
Dept: PALLIATIVE MEDICINE | Facility: OTHER | Age: 59
End: 2019-11-05

## 2019-11-06 LAB — LAB SCANNED RESULT: NORMAL

## 2019-11-07 ENCOUNTER — TELEPHONE (OUTPATIENT)
Dept: UROLOGY | Facility: CLINIC | Age: 59
End: 2019-11-07

## 2019-11-07 NOTE — TELEPHONE ENCOUNTER
Reason for call:  Questions  Patient called regarding (reason for call): Would like to discuss removal of a stent.   Additional comments: please call    Phone number to reach patient:  Home number on file [phone] (home)    Best Time:  any    Can we leave a detailed message on this number?  YES

## 2019-11-08 ENCOUNTER — ANESTHESIA EVENT (OUTPATIENT)
Dept: SURGERY | Facility: AMBULATORY SURGERY CENTER | Age: 59
End: 2019-11-08

## 2019-11-10 DIAGNOSIS — E89.0 POSTSURGICAL HYPOTHYROIDISM: ICD-10-CM

## 2019-11-10 DIAGNOSIS — E89.2 POSTSURGICAL HYPOPARATHYROIDISM (H): ICD-10-CM

## 2019-11-10 DIAGNOSIS — M79.18 MYOFASCIAL PAIN: ICD-10-CM

## 2019-11-10 DIAGNOSIS — C73 THYROID CANCER (H): ICD-10-CM

## 2019-11-10 DIAGNOSIS — C73 PAPILLARY CARCINOMA, FOLLICULAR VARIANT (H): ICD-10-CM

## 2019-11-11 ENCOUNTER — COMMUNICATION - HEALTHEAST (OUTPATIENT)
Dept: PALLIATIVE MEDICINE | Facility: OTHER | Age: 59
End: 2019-11-11

## 2019-11-11 ENCOUNTER — ANCILLARY PROCEDURE (OUTPATIENT)
Dept: RADIOLOGY | Facility: AMBULATORY SURGERY CENTER | Age: 59
End: 2019-11-11
Attending: INTERNAL MEDICINE
Payer: COMMERCIAL

## 2019-11-11 ENCOUNTER — ANESTHESIA (OUTPATIENT)
Dept: SURGERY | Facility: AMBULATORY SURGERY CENTER | Age: 59
End: 2019-11-11

## 2019-11-11 ENCOUNTER — HOSPITAL ENCOUNTER (OUTPATIENT)
Facility: AMBULATORY SURGERY CENTER | Age: 59
End: 2019-11-11
Attending: PHYSICIAN ASSISTANT
Payer: COMMERCIAL

## 2019-11-11 VITALS
RESPIRATION RATE: 16 BRPM | HEART RATE: 89 BPM | OXYGEN SATURATION: 95 % | HEIGHT: 65 IN | TEMPERATURE: 97.6 F | SYSTOLIC BLOOD PRESSURE: 111 MMHG | BODY MASS INDEX: 31.65 KG/M2 | WEIGHT: 190 LBS | DIASTOLIC BLOOD PRESSURE: 73 MMHG

## 2019-11-11 DIAGNOSIS — Z85.3 PERSONAL HISTORY OF MALIGNANT NEOPLASM OF BREAST: ICD-10-CM

## 2019-11-11 DIAGNOSIS — G89.4 CHRONIC PAIN SYNDROME: ICD-10-CM

## 2019-11-11 DIAGNOSIS — C73 PAPILLARY CARCINOMA, FOLLICULAR VARIANT (H): ICD-10-CM

## 2019-11-11 LAB
ERYTHROCYTE [DISTWIDTH] IN BLOOD BY AUTOMATED COUNT: 11.2 % (ref 10–15)
HCT VFR BLD AUTO: 43.6 % (ref 35–47)
HGB BLD-MCNC: 14.7 G/DL (ref 11.7–15.7)
INR PPP: 0.93 (ref 0.86–1.14)
MCH RBC QN AUTO: 33.3 PG (ref 26.5–33)
MCHC RBC AUTO-ENTMCNC: 33.7 G/DL (ref 31.5–36.5)
MCV RBC AUTO: 99 FL (ref 78–100)
PLATELET # BLD AUTO: 211 10E9/L (ref 150–450)
RBC # BLD AUTO: 4.42 10E12/L (ref 3.8–5.2)
WBC # BLD AUTO: 5.2 10E9/L (ref 4–11)

## 2019-11-11 RX ORDER — HYDROCODONE BITARTRATE AND ACETAMINOPHEN 10; 325 MG/1; MG/1
TABLET ORAL
Refills: 0 | COMMUNITY
Start: 2019-11-04 | End: 2021-08-11

## 2019-11-11 RX ORDER — FENTANYL CITRATE 50 UG/ML
25-50 INJECTION, SOLUTION INTRAMUSCULAR; INTRAVENOUS
Status: DISCONTINUED | OUTPATIENT
Start: 2019-11-11 | End: 2019-11-12 | Stop reason: HOSPADM

## 2019-11-11 RX ORDER — ONDANSETRON 4 MG/1
4 TABLET, ORALLY DISINTEGRATING ORAL EVERY 30 MIN PRN
Status: DISCONTINUED | OUTPATIENT
Start: 2019-11-11 | End: 2019-11-12 | Stop reason: HOSPADM

## 2019-11-11 RX ORDER — DEXTROSE MONOHYDRATE 25 G/50ML
25-50 INJECTION, SOLUTION INTRAVENOUS
Status: DISCONTINUED | OUTPATIENT
Start: 2019-11-11 | End: 2019-11-12 | Stop reason: HOSPADM

## 2019-11-11 RX ORDER — LIDOCAINE HYDROCHLORIDE 20 MG/ML
INJECTION, SOLUTION INFILTRATION; PERINEURAL PRN
Status: DISCONTINUED | OUTPATIENT
Start: 2019-11-11 | End: 2019-11-11

## 2019-11-11 RX ORDER — METHOCARBAMOL 750 MG/1
TABLET, FILM COATED ORAL
Qty: 120 TABLET | Refills: 2 | OUTPATIENT
Start: 2019-11-11

## 2019-11-11 RX ORDER — LIDOCAINE 40 MG/G
CREAM TOPICAL
Status: DISCONTINUED | OUTPATIENT
Start: 2019-11-11 | End: 2019-11-12 | Stop reason: HOSPADM

## 2019-11-11 RX ORDER — SODIUM CHLORIDE, SODIUM LACTATE, POTASSIUM CHLORIDE, CALCIUM CHLORIDE 600; 310; 30; 20 MG/100ML; MG/100ML; MG/100ML; MG/100ML
INJECTION, SOLUTION INTRAVENOUS CONTINUOUS
Status: DISCONTINUED | OUTPATIENT
Start: 2019-11-11 | End: 2019-11-12 | Stop reason: HOSPADM

## 2019-11-11 RX ORDER — ONDANSETRON 2 MG/ML
4 INJECTION INTRAMUSCULAR; INTRAVENOUS EVERY 30 MIN PRN
Status: DISCONTINUED | OUTPATIENT
Start: 2019-11-11 | End: 2019-11-12 | Stop reason: HOSPADM

## 2019-11-11 RX ORDER — HYDROCODONE BITARTRATE AND ACETAMINOPHEN 5; 325 MG/1; MG/1
1-2 TABLET ORAL
COMMUNITY
Start: 2019-11-01 | End: 2020-04-06

## 2019-11-11 RX ORDER — MEPERIDINE HYDROCHLORIDE 25 MG/ML
12.5 INJECTION INTRAMUSCULAR; INTRAVENOUS; SUBCUTANEOUS
Status: DISCONTINUED | OUTPATIENT
Start: 2019-11-11 | End: 2019-11-12 | Stop reason: HOSPADM

## 2019-11-11 RX ORDER — PROPOFOL 10 MG/ML
INJECTION, EMULSION INTRAVENOUS PRN
Status: DISCONTINUED | OUTPATIENT
Start: 2019-11-11 | End: 2019-11-11

## 2019-11-11 RX ORDER — ONDANSETRON 2 MG/ML
INJECTION INTRAMUSCULAR; INTRAVENOUS PRN
Status: DISCONTINUED | OUTPATIENT
Start: 2019-11-11 | End: 2019-11-11

## 2019-11-11 RX ORDER — KETAMINE HYDROCHLORIDE 100 MG/ML
1 INJECTION INTRAMUSCULAR; INTRAVENOUS
COMMUNITY
End: 2021-08-11

## 2019-11-11 RX ORDER — PROPOFOL 10 MG/ML
INJECTION, EMULSION INTRAVENOUS CONTINUOUS PRN
Status: DISCONTINUED | OUTPATIENT
Start: 2019-11-11 | End: 2019-11-11

## 2019-11-11 RX ORDER — CIPROFLOXACIN 250 MG/1
250 TABLET, FILM COATED ORAL
COMMUNITY
Start: 2019-11-01 | End: 2019-12-02

## 2019-11-11 RX ORDER — NICOTINE POLACRILEX 4 MG
15-30 LOZENGE BUCCAL
Status: DISCONTINUED | OUTPATIENT
Start: 2019-11-11 | End: 2019-11-12 | Stop reason: HOSPADM

## 2019-11-11 RX ORDER — OXYCODONE HYDROCHLORIDE 5 MG/1
5 TABLET ORAL EVERY 4 HOURS PRN
Status: DISCONTINUED | OUTPATIENT
Start: 2019-11-11 | End: 2019-11-12 | Stop reason: HOSPADM

## 2019-11-11 RX ORDER — NALOXONE HYDROCHLORIDE 0.4 MG/ML
.1-.4 INJECTION, SOLUTION INTRAMUSCULAR; INTRAVENOUS; SUBCUTANEOUS
Status: DISCONTINUED | OUTPATIENT
Start: 2019-11-11 | End: 2019-11-12 | Stop reason: HOSPADM

## 2019-11-11 RX ORDER — FENTANYL CITRATE 50 UG/ML
INJECTION, SOLUTION INTRAMUSCULAR; INTRAVENOUS PRN
Status: DISCONTINUED | OUTPATIENT
Start: 2019-11-11 | End: 2019-11-11

## 2019-11-11 RX ORDER — GABAPENTIN 300 MG/1
300 CAPSULE ORAL ONCE
Status: DISCONTINUED | OUTPATIENT
Start: 2019-11-11 | End: 2019-11-12 | Stop reason: HOSPADM

## 2019-11-11 RX ORDER — ACETAMINOPHEN 325 MG/1
975 TABLET ORAL ONCE
Status: COMPLETED | OUTPATIENT
Start: 2019-11-11 | End: 2019-11-11

## 2019-11-11 RX ADMIN — SODIUM CHLORIDE, SODIUM LACTATE, POTASSIUM CHLORIDE, CALCIUM CHLORIDE: 600; 310; 30; 20 INJECTION, SOLUTION INTRAVENOUS at 08:47

## 2019-11-11 RX ADMIN — LIDOCAINE HYDROCHLORIDE 40 MG: 20 INJECTION, SOLUTION INFILTRATION; PERINEURAL at 09:17

## 2019-11-11 RX ADMIN — PROPOFOL 50 MG: 10 INJECTION, EMULSION INTRAVENOUS at 09:17

## 2019-11-11 RX ADMIN — ACETAMINOPHEN 975 MG: 325 TABLET ORAL at 08:44

## 2019-11-11 RX ADMIN — PROPOFOL 200 MCG/KG/MIN: 10 INJECTION, EMULSION INTRAVENOUS at 09:17

## 2019-11-11 RX ADMIN — FENTANYL CITRATE 50 MCG: 50 INJECTION, SOLUTION INTRAMUSCULAR; INTRAVENOUS at 09:13

## 2019-11-11 RX ADMIN — ONDANSETRON 4 MG: 2 INJECTION INTRAMUSCULAR; INTRAVENOUS at 09:19

## 2019-11-11 RX ADMIN — FENTANYL CITRATE 50 MCG: 50 INJECTION, SOLUTION INTRAMUSCULAR; INTRAVENOUS at 09:17

## 2019-11-11 ASSESSMENT — MIFFLIN-ST. JEOR: SCORE: 1437.71

## 2019-11-11 ASSESSMENT — COPD QUESTIONNAIRES: COPD: 0

## 2019-11-11 ASSESSMENT — LIFESTYLE VARIABLES: TOBACCO_USE: 0

## 2019-11-11 NOTE — ANESTHESIA POSTPROCEDURE EVALUATION
Anesthesia POST Procedure Evaluation    Patient: Tisha Arias   MRN:     [id number] Gender:   female   Age:    59 year old :      1960        Preoperative Diagnosis: Personal history of malignant neoplasm of breast [Z85.3]   Procedure(s):  Right Port Removal   Postop Comments: No value filed.       Anesthesia Type:  Not documented  MAC    Reportable Event: NO     PAIN: Uncomplicated   Sign Out status: Comfortable, Well controlled pain     PONV: No PONV   Sign Out status:  No Nausea or Vomiting     Neuro/Psych: Uneventful perioperative course   Sign Out Status: Preoperative baseline; Age appropriate mentation     Airway/Resp.: Uneventful perioperative course   Sign Out Status: Non labored breathing, age appropriate RR; Resp. Status within EXPECTED Parameters     CV: Uneventful perioperative course   Sign Out status: Appropriate BP and perfusion indices; Appropriate HR/Rhythm     Disposition:   Sign Out in:  PACU  Disposition:  Phase II; Home  Recovery Course: Uneventful  Follow-Up: Not required           Last Anesthesia Record Vitals:  CRNA VITALS  2019 0925 - 2019 1025      2019             Resp Rate (set):  10          Last PACU Vitals:  Vitals Value Taken Time   BP     Temp     Pulse     Resp     SpO2     Temp src Available 2019  9:45 AM   NIBP 99/60 2019  9:51 AM   Pulse 80 2019  9:54 AM   SpO2 95 % 2019  9:54 AM   Resp     Temp     Ht Rate 80 2019  9:54 AM   Temp 2           Electronically Signed By: Tomer Stubbs MD, 2019, 11:23 AM

## 2019-11-11 NOTE — ANESTHESIA PREPROCEDURE EVALUATION
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 No real need for Epipen for this alone.     GDM (gestational diabetes mellitus)     w first pregnancy only     Gestational hypertension      Lymphedema     jackson arms, chest     Migraine      Neuropathy associated with cancer (H)      Papillary carcinoma, follicular variant (H) 2013    pT3, N1, Mx, thyroid     Post-surgical hypothyroidism      Renal disease     kidney stones     Rhinitis, allergic to other allergen      Right Breast mass and microcalcifications 2011     Seasonal allergic conjunctivitis      Seasonal allergic rhinitis 11 skin tests pos. for: dog/M/T/G/W--NEGATIVE FOOD TESTS FOR: shrimp, crab, lobster, coconut      Past Surgical History:   Procedure Laterality Date     BACK SURGERY  ,    Bulging disc w nerve root impigment     BIOPSY BREAST      right     BIOPSY BREAST  11    right, core and sterotactic     BONE MARROW BIOPSY, BONE SPECIMEN, NEEDLE/TROCAR Left 10/3/2017    Procedure: BIOPSY BONE MARROW;  Bone Marrow Biopsy with aspirate;  Surgeon: Amelia Watkins PA-C;  Location: [facility] OR     BYPASS GASTRIC, CHOLECYSTECTOMY, COMBINED       C LAPAROSCOPIC GASTRIC RESTRICTIVE PX, W/GASTRIC BYPASS/ GAMALIEL-EN-Y, < 150CM      Gamaliel en Y?      SECTION       COLONOSCOPY      normal     COSMETIC SURGERY  2012    Final Stage  of Mastectomy     DAVINCI HYSTERECTOMY TOTAL, BILATERAL SALPINGO-OOPHORECTOMY, COMBINED  12/12/2012    Procedure: COMBINED DAVINCI HYSTERECTOMY TOTAL, SALPINGO-OOPHORECTOMY;  Davinci Total Laparoscopic Hysterectomy, Bilateral Salpingo Oophorectomy, Pelvic Washings, Cystoscopy;  Surgeon: Evie Sheikh MD;  Location: [facility] OR     ENT SURGERY  1982     ESOPHAGOSCOPY, GASTROSCOPY, DUODENOSCOPY (EGD), COMBINED N/A 9/14/2017    Procedure: COMBINED ENDOSCOPIC ULTRASOUND, ESOPHAGOSCOPY, GASTROSCOPY, DUODENOSCOPY (EGD), FINE NEEDLE ASPIRATE/BIOPSY;  Esophagogastroduodenoscopy, Endoscopic Ultrasound, with fine needle biopsy aspirate;  Surgeon: Patrick Robles MD;  Location: [facility] OR     EXTRACORPOREAL SHOCK WAVE LITHOTRIPSY, CYSTOSCOPY, INSERT STENT URETER(S), COMBINED Right 8/19/2019    Procedure: EXTRACORPORAL SHOCKWAVE LITHOTRIPSY,RIGHT URETERAL STENT PLACEMENT;  Surgeon: Pablo Graham MD;  Location:  OR     GI SURGERY  11-    Rachael-en Y w cholecystectomy     GYN SURGERY  1/6/89,1/10/92    2 c-sections     HYSTERECTOMY, PAP NO LONGER INDICATED  12/12    DeVinci assister lap hyst with BSO     INSERT PORT VASCULAR ACCESS Right 10/4/2017    Procedure: INSERT PORT VASCULAR ACCESS;  Port Placement;  Surgeon: Jc Goodman PA-C;  Location: [facility] OR     IRRIGATION AND DEBRIDEMENT BREAST  3/1/2012    Procedure:IRRIGATION AND DEBRIDEMENT BREAST; Irrigation and Debridement, Wound Closure Right Breast; Surgeon:JUNG GUILLEN; Location:[facility] OR     MASTECTOMY, RECONSTRUCT BREAST, COMBINED  1/30/2012    Procedure:COMBINED MASTECTOMY, RECONSTRUCT BREAST; Bilateral Mastectomies, Right Axillary Elizabeth Node Biopsy, Bilateral Breast Reconstruction with Tissue Expanders, Reconstruction of inframammary fold, bilateral pain management systems; Surgeon:ANUPAM CAMPBELL; Location:[facility] OR     RECONSTRUCT BREAST  8/31/2012    Procedure: RECONSTRUCT BREAST;  Bilateral 2nd stage breast reconstruction, revision,        SOFT TISSUE SURGERY  1-30-12    Mastectomy w severe myofascial pain syndrome     THYROIDECTOMY  7/10/2013    Procedure: THYROIDECTOMY;  Total Thyroidectomy with central neck dissection;  Surgeon: Indiana Sneed MD;  Location: [facility] OR     TONSILLECTOMY      childhood          Anesthesia Evaluation     . Pt has had prior anesthetic. Type: General    No history of anesthetic complications          ROS/MED HX    ENT/Pulmonary:     (+)Intermittent asthma , . .   (-) tobacco use and COPD   Neurologic:     (+)neuropathy    (-) CVA and TIA   Cardiovascular:        (-) hypertension, CAD, irregular heartbeat/palpitations and stent   METS/Exercise Tolerance:     Hematologic:        (-) anemia   Musculoskeletal:         GI/Hepatic:        (-) GERD and liver disease   Renal/Genitourinary:      (-) renal disease   Endo:     (+) thyroid problem .   (-) Type I DM and Type II DM   Psychiatric:         Infectious Disease:  - neg infectious disease ROS       Malignancy:   (+) Malignancy           Other:    (+) H/O Chronic Pain,                       PHYSICAL EXAM:   Mental Status/Neuro: A/A/O   Airway: Facies: Feasible  Mallampati: II  Mouth/Opening: Full  TM distance: > 6 cm  Neck ROM: Full   Respiratory: Auscultation: CTAB     Resp. Rate: Normal     Resp. Effort: Normal      CV: Rhythm: Regular  Rate: Age appropriate  Heart: Normal Sounds  Edema: None   Comments:      Dental: Normal Dentition                LABS:  CBC:   Lab Results   Component Value Date    WBC 4.6 10/11/2019    WBC 3.4 (L) 08/08/2019    HGB 14.5 10/11/2019    HGB 13.2 08/08/2019    HCT 41.1 11/01/2019    HCT 43.3 10/11/2019     10/11/2019     08/08/2019     BMP:   Lab Results   Component Value Date     10/11/2019     07/02/2019    POTASSIUM 3.8 10/11/2019    POTASSIUM 4.0 07/02/2019    CHLORIDE 103 10/11/2019    CHLORIDE 104 07/02/2019    CO2 29 10/11/2019    CO2 30 07/02/2019    BUN 16 10/11/2019    BUN 14 07/02/2019    CR 0.76  "10/29/2019    CR 0.84 10/11/2019     (H) 10/11/2019    GLC 91 07/02/2019     COAGS:   Lab Results   Component Value Date    PTT 26 09/01/2017    INR 0.99 12/28/2017     POC:   Lab Results   Component Value Date     (H) 02/27/2018     OTHER:   Lab Results   Component Value Date    LACT 2.3 (H) 11/18/2017    A1C 5.5 01/21/2013    ELMO 8.6 10/29/2019    PHOS 3.6 10/29/2019    MAG 2.0 08/31/2018    ALBUMIN 3.9 10/11/2019    PROTTOTAL 7.4 10/11/2019    ALT 25 10/11/2019    AST 18 10/11/2019    ALKPHOS 70 10/11/2019    BILITOTAL 0.5 10/11/2019    LIPASE 312 (H) 05/28/2013    TSH <0.01 (L) 10/29/2019    T4 1.57 (H) 10/29/2019    CRP Canceled, Test credited 08/08/2019    SED 7 04/03/2013        Preop Vitals    BP Readings from Last 3 Encounters:   10/11/19 (!) 156/89   09/03/19 136/81   08/19/19 (!) 146/93    Pulse Readings from Last 3 Encounters:   10/11/19 116   09/03/19 112   08/05/19 112      Resp Readings from Last 3 Encounters:   09/03/19 14   08/19/19 20   08/05/19 20    SpO2 Readings from Last 3 Encounters:   10/11/19 97%   08/19/19 99%   08/05/19 99%      Temp Readings from Last 1 Encounters:   10/11/19 37.2  C (98.9  F) (Oral)    Ht Readings from Last 1 Encounters:   07/01/19 1.651 m (5' 5\")      Wt Readings from Last 1 Encounters:   10/11/19 85.4 kg (188 lb 4.8 oz)    Estimated body mass index is 31.33 kg/m  as calculated from the following:    Height as of 7/1/19: 1.651 m (5' 5\").    Weight as of 10/11/19: 85.4 kg (188 lb 4.8 oz).     LDA:  Peripheral IV 03/15/19 Right Upper forearm (Active)   Number of days: 241       Peripheral IV (Active)   Number of days:        Port A Cath Single Right Chest wall (Active)   Number of days:        Ureteral Drain/Stent Right ureter 6 fr (Active)   Number of days: 84        Assessment:   ASA SCORE: 3    H&P: History and physical reviewed and following examination; no interval change.    NPO Status: NPO Appropriate     Plan:   Anes. Type:  MAC   Pre-Medication: " None   Induction:  N/a   Airway: Native Airway   Access/Monitoring: PIV   Maintenance: N/a     Postop Plan:   Postop Pain: None  Postop Sedation/Airway: Not planned     PONV Management: Adult Risk Factors: Female   Prevention: Ondansetron, Dexamethasone     CONSENT: Direct conversation   Plan and risks discussed with: Patient   Blood Products: Consent Deferred (Minimal Blood Loss)       Comments for Plan/Consent:  Plan:  MAC with routine monitors  Patient has Mast Cell Activation Syndrome but has tolerated versed, fentanyl, and propofol in the past  Patient reports increased anesthetic amounts needed in the past    MD Tomer Willson MD

## 2019-11-11 NOTE — TELEPHONE ENCOUNTER
levothyroxine (SYNTHROID/LEVOTHROID) 112 MCG tablet     Last Written Prescription Date:  8/23/19  Last Fill Quantity: 67,   # refills: 2  Last Office Visit : 11/26/18  Future Office visit:  11/15/19    Routing refill request to provider for review/approval because:  Recent Labs   Lab Test 10/29/19  1210   TSH <0.01*   Thank-you, Paola RUIZ RN Medication Refill Team

## 2019-11-11 NOTE — OP NOTE
Interventional Radiology Brief Post Procedure Note    Procedure: Chest port removal    Proceduralist: Ely Leslie PA-C    Assistant: None    Time Out: Prior to the start of the procedure and with procedural staff participation, I verbally confirmed the patient s identity using two indicators, relevant allergies, that the procedure was appropriate and matched the consent or emergent situation, and that the correct equipment/implants were available. Immediately prior to starting the procedure I conducted the Time Out with the procedural staff and re-confirmed the patient s name, procedure, and site/side. (The Joint Commission universal protocol was followed.)  Yes        Sedation: Monitored Anesthesia Care (MAC) administered and documented by Anesthesia Care Provider    Findings: Completed removal of right side chest port in its entirety.       Estimated Blood Loss: Minimal    Fluoroscopy Time:  minute(s)    SPECIMENS: None    Complications: 1. None     Condition: Stable    Plan: Follow up per primary team    Comments: See dictated procedure note for full details.    Ely Leslie PA-C

## 2019-11-11 NOTE — ANESTHESIA CARE TRANSFER NOTE
Patient: Tisha Arias    Procedure(s):  Right Port Removal    Diagnosis: Personal history of malignant neoplasm of breast [Z85.3]  Diagnosis Additional Information: No value filed.    Anesthesia Type:   MAC     Note:    Patient transferred to:Phase II  Comments: Jacque Report: Identifed the Patient, Identified the Reponsible Provider, Reviewed the pertinent medical history, Discussed the surgical course, Reviewed Intra-OP anesthesia mangement and issues during anesthesia, Set expectations for post-procedure period and Allowed opportunity for questions and acknowledgement of understanding      Vitals: (Last set prior to Anesthesia Care Transfer)    CRNA VITALS  11/11/2019 0925 - 11/11/2019 1000      11/11/2019             Resp Rate (set):  10                Electronically Signed By: DENZEL Joe CRNA  November 11, 2019  10:00 AM

## 2019-11-11 NOTE — DISCHARGE INSTRUCTIONS
A collaboration between [facility] and [facility]  Experts in minimally invasive, targeted treatments performed using imaging guidance    Venous Access Device, Port Catheter or Tunneled Central Line Removal    Today you had your existing venous access device removed, either because it was no longer needed or because there was malfunction or infection issues.    One of our Radiology PAs performed this procedure for you today:  ? Alonzo Barfield, [facility], PAAdairC  ? TRACI Pastrana, PA-C  ? Carlos Strong PA-C  ? Ely Leslie MS, PA-C  ? oJse Zarate PA-C    After you go home:  - Drink plenty of fluids.  Generally 6-8 (8 ounce) glasses a day is recommended.  - Resume your regular diet unless otherwise ordered by a medical provider.  - Keep any applied tape/gauze dressings clean and dry.  Change tape/gauze dressings if they get wet or soiled.  - You may shower the following day after procedure, however cover and protect from moisture any tape/gauze dressings.  You may let water hit and run over dried skin glue, but do not scrub.  Pat the area dry after showering.  - Port removal incisions are closed with absorbable suture, meaning they do not need to be removed at a later date, and a topical skin adhesive (skin glue).  This glue will wear off in 7-14 days.  Do not remove before this time.  If 14 days have passed and residual glue is present, you may gently remove it.  - You may remove tape/gauze dressings after 5 days if the site looks closed and in the process of healing.  - Do not apply gels, lotions, or ointments to the glue site for the first 10 days as this may cause the glue to prematurely soften and fail.  - Do not perform strenuous activities or lift greater than 10 pounds for the next three days.  - If there is bleeding or oozing from the procedure site, apply firm pressure to the area for 5-10 minutes.  If the bleeding continues seek medical advice at the  numbers below.  - Mild procedure site discomfort can be treated with an ice pack and over-the-counter pain relievers.              For 24 hours after any sedation used:  - Relax and take it easy.  No strenuous activities.  - Do not drive or operate machines at home or at work.  - No alcohol consumption.  - Do not make any important or legal decisions.    Call our Interventional Radiology (IR) service if:  - If you start bleeding from the procedure site.  If you do start to bleed from the site, lie down and hold some pressure on the site.  Our radiology provider can help you decide if you need to return to the hospital.  - If you have new or worsening pain related to the procedure.  - If you have concerning swelling at the procedure site.  - If you develop persistent nausea or vomiting.  - If you develop hives or a rash or any unexplained itching.  - If you have a fever of greater than 100.5  F and chills in the first 5 days after procedure.  - Any other concerns related to your procedure.      [facility]  Interventional Radiology (IR)  [address] [place] [address]    Contact Number:  [phone]  (IR control desk)  - Monday - Friday 8:00 am - 4:30 pm    After hours for urgent concerns:  [phone]  - After 4:30 pm Monday - Friday, Weekends and Holidays.   - Ask for Interventional Radiology on-call.  Someone is available 24 hours a day.  - [facility] toll free number:  [phone]

## 2019-11-15 ENCOUNTER — VIRTUAL VISIT (OUTPATIENT)
Dept: ENDOCRINOLOGY | Facility: CLINIC | Age: 59
End: 2019-11-15
Payer: COMMERCIAL

## 2019-11-15 DIAGNOSIS — Z98.84 STATUS POST BARIATRIC SURGERY: ICD-10-CM

## 2019-11-15 DIAGNOSIS — H57.9 EYE PROBLEMS: ICD-10-CM

## 2019-11-15 DIAGNOSIS — C73 PAPILLARY CARCINOMA, FOLLICULAR VARIANT (H): ICD-10-CM

## 2019-11-15 DIAGNOSIS — E89.0 POSTSURGICAL HYPOTHYROIDISM: ICD-10-CM

## 2019-11-15 DIAGNOSIS — E89.2 POSTSURGICAL HYPOPARATHYROIDISM (H): Primary | ICD-10-CM

## 2019-11-15 RX ORDER — LEVOTHYROXINE SODIUM 112 UG/1
TABLET ORAL
Qty: 180 TABLET | Refills: 4 | Status: SHIPPED | OUTPATIENT
Start: 2019-11-15 | End: 2020-04-06

## 2019-11-15 RX ORDER — LEVOTHYROXINE SODIUM 112 UG/1
TABLET ORAL
Qty: 180 TABLET | Refills: 4 | Status: SHIPPED | OUTPATIENT
Start: 2019-11-15 | End: 2020-11-20

## 2019-11-15 RX ORDER — MAGNESIUM 200 MG
100 TABLET ORAL 3 TIMES DAILY
COMMUNITY
Start: 2019-11-15 | End: 2021-08-11

## 2019-11-15 NOTE — PATIENT INSTRUCTIONS
Bone density    Reduce levothyroxine to the following using the 112 mcg tablets as follows  MON to SAT 2 tablet/day;  SUN 2.5 tablet  This averages 232 mcg/day over the course of a week.    You should have labs to follow up on the change in about 3 months (mid Feb, 2020).  I am leaving orders for follow up labs on the medical record.  Please call [phone]  to schedule lab follow up testing as directed.  Alternatively, it can be drawn in any [facility] lab.      Ophthalmology referral placed

## 2019-11-18 ENCOUNTER — ANCILLARY PROCEDURE (OUTPATIENT)
Dept: GENERAL RADIOLOGY | Facility: CLINIC | Age: 59
End: 2019-11-18
Attending: UROLOGY
Payer: COMMERCIAL

## 2019-11-18 ENCOUNTER — COMMUNICATION - HEALTHEAST (OUTPATIENT)
Dept: PHARMACY | Facility: OTHER | Age: 59
End: 2019-11-18

## 2019-11-18 ENCOUNTER — OFFICE VISIT (OUTPATIENT)
Dept: UROLOGY | Facility: CLINIC | Age: 59
End: 2019-11-18
Payer: COMMERCIAL

## 2019-11-18 ENCOUNTER — HOSPITAL ENCOUNTER (OUTPATIENT)
Dept: PHARMACY | Facility: OTHER | Age: 59
Discharge: HOME | End: 2019-11-18
Attending: PHARMACIST

## 2019-11-18 DIAGNOSIS — N20.0 KIDNEY STONE: Primary | ICD-10-CM

## 2019-11-18 DIAGNOSIS — G89.29 OTHER CHRONIC PAIN: ICD-10-CM

## 2019-11-18 DIAGNOSIS — G89.4 CHRONIC PAIN SYNDROME: ICD-10-CM

## 2019-11-18 PROCEDURE — 74019 RADEX ABDOMEN 2 VIEWS: CPT

## 2019-11-18 PROCEDURE — 52310 CYSTOSCOPY AND TREATMENT: CPT | Performed by: UROLOGY

## 2019-11-18 NOTE — PROGRESS NOTES
Patient returns to clinic for cysto and stent removal.  she is s/p ureteroscopy and stone removal recently.  KUB today shows no stones.    Procedure: patient was brought to the cysto suite.  she was draped and prepped in the usual surgical fashion.  Cystoscopy placed. Stent removed without problems.    Recommendation:  Return to Clinic: as needed

## 2019-11-19 ENCOUNTER — TELEPHONE (OUTPATIENT)
Dept: FAMILY MEDICINE | Facility: CLINIC | Age: 59
End: 2019-11-19

## 2019-11-19 NOTE — TELEPHONE ENCOUNTER
Reason for Call:  Other letter    Detailed comments: Patient requesting a return to work letter before 11/26/2019.   Please call to advise.     Phone Number Patient can be reached at: Home number on file [phone] (home)    Best Time: Any     Can we leave a detailed message on this number? YES    Call taken on 11/19/2019 at 12:01 PM by Cydney Bell

## 2019-11-20 ENCOUNTER — ANCILLARY PROCEDURE (OUTPATIENT)
Dept: BONE DENSITY | Facility: CLINIC | Age: 59
End: 2019-11-20
Payer: COMMERCIAL

## 2019-11-20 DIAGNOSIS — C73 PAPILLARY CARCINOMA, FOLLICULAR VARIANT (H): ICD-10-CM

## 2019-11-20 DIAGNOSIS — E89.0 POSTSURGICAL HYPOTHYROIDISM: ICD-10-CM

## 2019-11-20 DIAGNOSIS — E89.2 POSTSURGICAL HYPOPARATHYROIDISM (H): ICD-10-CM

## 2019-11-20 NOTE — TELEPHONE ENCOUNTER
If she needs a letter to go back to work, she needs to be assessed to have a letter written.    Pablo Zarco MD

## 2019-11-22 ENCOUNTER — OFFICE VISIT (OUTPATIENT)
Dept: FAMILY MEDICINE | Facility: CLINIC | Age: 59
End: 2019-11-22
Payer: COMMERCIAL

## 2019-11-22 ENCOUNTER — MYC MEDICAL ADVICE (OUTPATIENT)
Dept: ENDOCRINOLOGY | Facility: CLINIC | Age: 59
End: 2019-11-22

## 2019-11-22 VITALS
HEART RATE: 131 BPM | DIASTOLIC BLOOD PRESSURE: 82 MMHG | SYSTOLIC BLOOD PRESSURE: 156 MMHG | HEIGHT: 65 IN | BODY MASS INDEX: 32.52 KG/M2 | OXYGEN SATURATION: 98 % | WEIGHT: 195.2 LBS | TEMPERATURE: 99 F | RESPIRATION RATE: 20 BRPM

## 2019-11-22 DIAGNOSIS — D69.6 THROMBOCYTOPENIA, UNSPECIFIED (H): ICD-10-CM

## 2019-11-22 DIAGNOSIS — M79.2 NEUROPATHIC PAIN OF CHEST: Primary | ICD-10-CM

## 2019-11-22 DIAGNOSIS — G89.4 CHRONIC PAIN SYNDROME: ICD-10-CM

## 2019-11-22 DIAGNOSIS — C77.0 METASTASIS TO CERVICAL LYMPH NODE (H): ICD-10-CM

## 2019-11-22 PROCEDURE — 99213 OFFICE O/P EST LOW 20 MIN: CPT | Performed by: FAMILY MEDICINE

## 2019-11-22 ASSESSMENT — ASTHMA QUESTIONNAIRES
ACUTE_EXACERBATION_TODAY: NO
QUESTION_3 LAST FOUR WEEKS HOW OFTEN DID YOUR ASTHMA SYMPTOMS (WHEEZING, COUGHING, SHORTNESS OF BREATH, CHEST TIGHTNESS OR PAIN) WAKE YOU UP AT NIGHT OR EARLIER THAN USUAL IN THE MORNING: NOT AT ALL
QUESTION_5 LAST FOUR WEEKS HOW WOULD YOU RATE YOUR ASTHMA CONTROL: WELL CONTROLLED
QUESTION_1 LAST FOUR WEEKS HOW MUCH OF THE TIME DID YOUR ASTHMA KEEP YOU FROM GETTING AS MUCH DONE AT WORK, SCHOOL OR AT HOME: NONE OF THE TIME
QUESTION_2 LAST FOUR WEEKS HOW OFTEN HAVE YOU HAD SHORTNESS OF BREATH: NOT AT ALL
QUESTION_4 LAST FOUR WEEKS HOW OFTEN HAVE YOU USED YOUR RESCUE INHALER OR NEBULIZER MEDICATION (SUCH AS ALBUTEROL): ONCE A WEEK OR LESS
ACT_TOTALSCORE: 23

## 2019-11-22 ASSESSMENT — MIFFLIN-ST. JEOR: SCORE: 1461.3

## 2019-11-22 NOTE — PROGRESS NOTES
"Subjective     Tisha Arias is a 59 year old female who presents to clinic today for the following health issues:    HPI   Chief Complaint   Patient presents with     RECHECK     Would like to follow up from last telephone visit and return to work with [employer]     Patient presents to discuss chronic pain control.  She has seen several pain specialists, however has not been able to keep chronic pain under control.  She has appointment scheduled for care at a new clinic.    BP Readings from Last 3 Encounters:   12/13/19 (!) 140/80   11/22/19 (!) 156/82   11/11/19 111/73    Wt Readings from Last 3 Encounters:   12/13/19 89.3 kg (196 lb 12.8 oz)   11/22/19 88.5 kg (195 lb 3.2 oz)   11/11/19 86.2 kg (190 lb)                    Reviewed and updated as needed this visit by Provider  Tobacco  Allergies  Meds  Problems  Med Hx  Surg Hx  Fam Hx         Review of Systems   ROS COMP: Constitutional, HEENT, cardiovascular, pulmonary, gi and gu systems are negative, except as otherwise noted.      Objective    BP (!) 156/82   Pulse 131   Temp 99  F (37.2  C) (Oral)   Resp 20   Ht 1.651 m (5' 5\")   Wt 88.5 kg (195 lb 3.2 oz)   SpO2 98%   BMI 32.48 kg/m    Body mass index is 32.48 kg/m .  Physical Exam   GENERAL: healthy, alert and no distress  EYES: Eyes grossly normal to inspection, PERRL and conjunctivae and sclerae normal  SKIN: no suspicious lesions or rashes  PSYCH: mentation appears normal, affect normal/bright          Assessment & Plan       ICD-10-CM    1. Neuropathic pain of chest M79.2    2. Metastasis to cervical lymph node (H) C77.0    3. Thrombocytopenia, unspecified (H) D69.6    4. Chronic pain syndrome G89.4         Follow-up with new pain specialist        Follow up if symptoms worsen or fail to improve.   Pablo Zarco MD  [facility]"

## 2019-11-23 ASSESSMENT — ASTHMA QUESTIONNAIRES: ACT_TOTALSCORE: 23

## 2019-11-26 ENCOUNTER — COMMUNICATION - HEALTHEAST (OUTPATIENT)
Dept: PALLIATIVE MEDICINE | Facility: OTHER | Age: 59
End: 2019-11-26

## 2019-11-26 DIAGNOSIS — G89.4 CHRONIC PAIN SYNDROME: ICD-10-CM

## 2019-11-27 NOTE — TELEPHONE ENCOUNTER
Spoke to patient and transferred her to [facility]. Apologized for her difficulties in schedulin - not sure why the call center wouldn't schedule her, as the referral was entered correctly and had comments stating the need for the appointment.

## 2019-12-01 ENCOUNTER — COMMUNICATION - HEALTHEAST (OUTPATIENT)
Dept: PALLIATIVE MEDICINE | Facility: OTHER | Age: 59
End: 2019-12-01

## 2019-12-01 DIAGNOSIS — G89.29 OTHER CHRONIC PAIN: ICD-10-CM

## 2019-12-01 DIAGNOSIS — H53.10 SUBJECTIVE VISION DISTURBANCE: Primary | ICD-10-CM

## 2019-12-02 ENCOUNTER — OFFICE VISIT (OUTPATIENT)
Dept: OPHTHALMOLOGY | Facility: CLINIC | Age: 59
End: 2019-12-02
Attending: STUDENT IN AN ORGANIZED HEALTH CARE EDUCATION/TRAINING PROGRAM
Payer: COMMERCIAL

## 2019-12-02 DIAGNOSIS — H52.4 MYOPIA WITH ASTIGMATISM AND PRESBYOPIA, BILATERAL: Primary | ICD-10-CM

## 2019-12-02 DIAGNOSIS — H52.13 MYOPIA WITH ASTIGMATISM AND PRESBYOPIA, BILATERAL: Primary | ICD-10-CM

## 2019-12-02 DIAGNOSIS — H04.123 DRY EYE SYNDROME, BILATERAL: ICD-10-CM

## 2019-12-02 DIAGNOSIS — H52.203 MYOPIA WITH ASTIGMATISM AND PRESBYOPIA, BILATERAL: Primary | ICD-10-CM

## 2019-12-02 PROCEDURE — G0463 HOSPITAL OUTPT CLINIC VISIT: HCPCS | Mod: ZF

## 2019-12-02 PROCEDURE — 92015 DETERMINE REFRACTIVE STATE: CPT | Mod: ZF

## 2019-12-02 ASSESSMENT — TONOMETRY
IOP_METHOD: ICARE
OS_IOP_MMHG: 16
OD_IOP_MMHG: 13

## 2019-12-02 ASSESSMENT — REFRACTION_MANIFEST
OD_ADD: +2.50
OS_CYLINDER: +0.25
OD_CYLINDER: +0.50
OS_ADD: +2.50
OD_AXIS: 175
OS_SPHERE: -0.75
OS_AXIS: 180
OD_SPHERE: -0.25

## 2019-12-02 ASSESSMENT — SLIT LAMP EXAM - LIDS
COMMENTS: NORMAL, NO EDEMA
COMMENTS: NORMAL, NO EDEMA

## 2019-12-02 ASSESSMENT — REFRACTION_WEARINGRX
OS_ADD: +2.25
OS_SPHERE: -1.25
OS_AXIS: 121
SPECS_TYPE: PAL
OD_ADD: +2.25
OD_SPHERE: -1.25
OD_AXIS: 115
OD_CYLINDER: +0.75
OS_CYLINDER: +0.50

## 2019-12-02 ASSESSMENT — CONF VISUAL FIELD
OS_NORMAL: 1
METHOD: COUNTING FINGERS
OD_NORMAL: 1

## 2019-12-02 ASSESSMENT — VISUAL ACUITY
CORRECTION_TYPE: GLASSES
METHOD: SNELLEN - LINEAR
OS_CC: 20/20
OD_CC: 20/30
OD_CC+: -2
OS_CC+: -2

## 2019-12-02 NOTE — PROGRESS NOTES
HPI: Patient is a 59 year old female here for acute visit sent by Dr. Castro for consultation of foreign body sensation Reports that for the past 6 months she has been having pain/pressure behind both eyes that is worse in the right eye worse when reading. She also has watering of her eyes. Also reports that she has changed her glasses three times since July. Denies redness, new flashes/floaters, diplopia.     POH:  Refractive error    Ocular Meds:  Patanol    FH:  Mother with macular degeneration; negative for glaucoma    Assessment and Plan:  1. Dry eye syndrome, bilateral:   Start artificial tears four times daily prn   Start warm compresses twice daily both eyes    Discussed use of humidifier     2. Myopia with astigmatism and presbyopia, bilateral:   Discussed and dispensed new MRx    Return to clinic as needed    Attending Physician Attestation: Complete documentation of historical and exam elements from today's encounter can be found in the full encounter summary report (not reduplicated in this progress note). I personally obtained the chief complaint(s) and history of present illness. I confirmed and edited as necessary the review of systems, past medical/surgical history, family history, social history, and examination findings as documented by other; and I examined the patient myself. I personally reviewed the relevant tests, images, and reports as documented above. I formulated and edited as necessary the assessment and plan and discussed the findings and management plan with the patient and family. -Pablo Szymanski MD-      Pablo Szymanski MD  Neuro-Ophthalmology Fellow

## 2019-12-02 NOTE — NURSING NOTE
Chief Complaints and History of Present Illnesses   Patient presents with     Consult For     hypoparathyroidsim     Chief Complaint(s) and History of Present Illness(es)     Consult For     Laterality: both eyes    Associated symptoms: headache, dryness, tearing, eye pain and redness.  Negative for double vision    Pain scale: 0/10 (None currently.  The pain can get up to a 6 when it occurs.)    Comments: hypoparathyroidsim              Comments     She has been experiencing eye grittiness and eye pain/pressure behind both eyes, worse in the right eye for the past 6 months.  There is puffiness and redness with both eyes.  She has changed her glasses Rx 3 times since July.  Her vision seems blurred and variable.     VINH Diaz 7:48 AM  December 2, 2019

## 2019-12-09 ENCOUNTER — MYC REFILL (OUTPATIENT)
Dept: FAMILY MEDICINE | Facility: CLINIC | Age: 59
End: 2019-12-09

## 2019-12-09 ENCOUNTER — MYC REFILL (OUTPATIENT)
Dept: WOUND CARE | Facility: CLINIC | Age: 59
End: 2019-12-09

## 2019-12-09 ENCOUNTER — MYC MEDICAL ADVICE (OUTPATIENT)
Dept: FAMILY MEDICINE | Facility: CLINIC | Age: 59
End: 2019-12-09

## 2019-12-09 DIAGNOSIS — J45.20 MILD INTERMITTENT ASTHMA WITHOUT COMPLICATION: ICD-10-CM

## 2019-12-09 DIAGNOSIS — B96.89 BACTERIAL SINUSITIS: ICD-10-CM

## 2019-12-09 DIAGNOSIS — J32.9 BACTERIAL SINUSITIS: ICD-10-CM

## 2019-12-09 DIAGNOSIS — R60.0 LOWER EXTREMITY EDEMA: ICD-10-CM

## 2019-12-09 NOTE — TELEPHONE ENCOUNTER
Pt was given provider's message via scenios.  Will keep encounter open to make sure pt reads scenios message.    Elvira Enamorado RN  [facility]

## 2019-12-09 NOTE — TELEPHONE ENCOUNTER
Dr. Soto,  Please see mychart message below and advise. Not sure if symptoms below were addressed in last office visit dated 11/22/19. Note is incomplete at this time.    Elvira Enamorado RN  [facility]

## 2019-12-09 NOTE — TELEPHONE ENCOUNTER
Symptoms were not addressed at last visit.  She needs to be assessed by a healthcare professional either at urgent care, ER, or clinic visit within the next week or so.    Pablo Zarco MD

## 2019-12-10 ENCOUNTER — COMMUNICATION - HEALTHEAST (OUTPATIENT)
Dept: PALLIATIVE MEDICINE | Facility: OTHER | Age: 59
End: 2019-12-10

## 2019-12-10 ENCOUNTER — HOSPITAL ENCOUNTER (OUTPATIENT)
Dept: PALLIATIVE MEDICINE | Facility: OTHER | Age: 59
Discharge: HOME | End: 2019-12-10
Attending: ANESTHESIOLOGY

## 2019-12-10 DIAGNOSIS — G89.29 OTHER CHRONIC PAIN: ICD-10-CM

## 2019-12-10 DIAGNOSIS — G89.4 CHRONIC PAIN SYNDROME: ICD-10-CM

## 2019-12-10 RX ORDER — ALBUTEROL SULFATE 90 UG/1
1-2 AEROSOL, METERED RESPIRATORY (INHALATION) EVERY 4 HOURS PRN
Qty: 18 G | Refills: 1 | Status: SHIPPED | OUTPATIENT
Start: 2019-12-10 | End: 2020-03-22

## 2019-12-10 RX ORDER — FLUTICASONE PROPIONATE 50 MCG
1-2 SPRAY, SUSPENSION (ML) NASAL DAILY
Qty: 16 G | Refills: 0 | Status: SHIPPED | OUTPATIENT
Start: 2019-12-10 | End: 2020-03-22

## 2019-12-10 ASSESSMENT — MIFFLIN-ST. JEOR: SCORE: 1446.32

## 2019-12-10 NOTE — TELEPHONE ENCOUNTER
Pt was given provider's message as written. She did not view [employer] message. Offered an appt for today with another provider and pt declined. She wants to only see Dr. Soto. Appt scheduled.    Elvira Enamorado RN  [facility]

## 2019-12-13 ENCOUNTER — OFFICE VISIT (OUTPATIENT)
Dept: FAMILY MEDICINE | Facility: CLINIC | Age: 59
End: 2019-12-13
Payer: COMMERCIAL

## 2019-12-13 ENCOUNTER — TELEPHONE (OUTPATIENT)
Dept: FAMILY MEDICINE | Facility: CLINIC | Age: 59
End: 2019-12-13

## 2019-12-13 VITALS
RESPIRATION RATE: 24 BRPM | BODY MASS INDEX: 32.79 KG/M2 | HEIGHT: 65 IN | HEART RATE: 135 BPM | TEMPERATURE: 98.5 F | OXYGEN SATURATION: 96 % | SYSTOLIC BLOOD PRESSURE: 140 MMHG | DIASTOLIC BLOOD PRESSURE: 80 MMHG | WEIGHT: 196.8 LBS

## 2019-12-13 DIAGNOSIS — E20.9 HYPOPARATHYROIDISM, UNSPECIFIED HYPOPARATHYROIDISM TYPE (H): ICD-10-CM

## 2019-12-13 DIAGNOSIS — M81.0 OSTEOPOROSIS, UNSPECIFIED OSTEOPOROSIS TYPE, UNSPECIFIED PATHOLOGICAL FRACTURE PRESENCE: ICD-10-CM

## 2019-12-13 DIAGNOSIS — R74.8 ELEVATED CK: Primary | ICD-10-CM

## 2019-12-13 DIAGNOSIS — M79.10 MUSCULAR ACHES: ICD-10-CM

## 2019-12-13 DIAGNOSIS — R23.3 PETECHIAL RASH: Primary | ICD-10-CM

## 2019-12-13 DIAGNOSIS — T38.6X5S ADVERSE EFFECT OF TAMOXIFEN, SEQUELA: ICD-10-CM

## 2019-12-13 LAB
ALBUMIN SERPL-MCNC: 4 G/DL (ref 3.4–5)
ALP SERPL-CCNC: 102 U/L (ref 40–150)
ALT SERPL W P-5'-P-CCNC: 53 U/L (ref 0–50)
ANION GAP SERPL CALCULATED.3IONS-SCNC: 9 MMOL/L (ref 3–14)
AST SERPL W P-5'-P-CCNC: 89 U/L (ref 0–45)
BASOPHILS # BLD AUTO: 0 10E9/L (ref 0–0.2)
BASOPHILS NFR BLD AUTO: 0.4 %
BILIRUB SERPL-MCNC: 0.4 MG/DL (ref 0.2–1.3)
BUN SERPL-MCNC: 15 MG/DL (ref 7–30)
CA-I SERPL ISE-MCNC: 4.2 MG/DL (ref 4.4–5.2)
CALCIUM SERPL-MCNC: 8.6 MG/DL (ref 8.5–10.1)
CHLORIDE SERPL-SCNC: 102 MMOL/L (ref 94–109)
CHOLEST SERPL-MCNC: NORMAL MG/DL
CK SERPL-CCNC: 3308 U/L (ref 30–225)
CO2 SERPL-SCNC: 30 MMOL/L (ref 20–32)
CREAT SERPL-MCNC: 0.94 MG/DL (ref 0.52–1.04)
CREAT UR-MCNC: 95 MG/DL
DIFFERENTIAL METHOD BLD: NORMAL
EOSINOPHIL # BLD AUTO: 0.1 10E9/L (ref 0–0.7)
EOSINOPHIL NFR BLD AUTO: 2 %
ERYTHROCYTE [DISTWIDTH] IN BLOOD BY AUTOMATED COUNT: 11.5 % (ref 10–15)
FIBRINOGEN PPP-MCNC: 348 MG/DL (ref 200–420)
GFR SERPL CREATININE-BSD FRML MDRD: 66 ML/MIN/{1.73_M2}
GLUCOSE SERPL-MCNC: 106 MG/DL (ref 70–99)
HCT VFR BLD AUTO: 44.1 % (ref 35–47)
HDLC SERPL-MCNC: NORMAL MG/DL
HGB BLD-MCNC: 14.5 G/DL (ref 11.7–15.7)
INR PPP: 1 (ref 0.86–1.14)
LDLC SERPL CALC-MCNC: NORMAL MG/DL (ref 0–130)
LYMPHOCYTES # BLD AUTO: 0.9 10E9/L (ref 0.8–5.3)
LYMPHOCYTES NFR BLD AUTO: 15.8 %
MAGNESIUM SERPL-MCNC: 2.2 MG/DL (ref 1.6–2.3)
MCH RBC QN AUTO: 32.3 PG (ref 26.5–33)
MCHC RBC AUTO-ENTMCNC: 32.9 G/DL (ref 31.5–36.5)
MCV RBC AUTO: 98 FL (ref 78–100)
MICROALBUMIN UR-MCNC: 7 MG/L
MICROALBUMIN/CREAT UR: 7.23 MG/G CR (ref 0–25)
MONOCYTES # BLD AUTO: 0.5 10E9/L (ref 0–1.3)
MONOCYTES NFR BLD AUTO: 8.7 %
NEUTROPHILS # BLD AUTO: 4.1 10E9/L (ref 1.6–8.3)
NEUTROPHILS NFR BLD AUTO: 73.1 %
NONHDLC SERPL-MCNC: NORMAL MG/DL
PLATELET # BLD AUTO: 232 10E9/L (ref 150–450)
POTASSIUM SERPL-SCNC: 3.5 MMOL/L (ref 3.4–5.3)
PROT SERPL-MCNC: 7.7 G/DL (ref 6.8–8.8)
RBC # BLD AUTO: 4.49 10E12/L (ref 3.8–5.2)
SODIUM SERPL-SCNC: 141 MMOL/L (ref 133–144)
T4 FREE SERPL-MCNC: 1.53 NG/DL (ref 0.76–1.46)
TRIGL SERPL-MCNC: NORMAL MG/DL
TSH SERPL DL<=0.005 MIU/L-ACNC: 0.01 MU/L (ref 0.4–4)
WBC # BLD AUTO: 5.6 10E9/L (ref 4–11)

## 2019-12-13 PROCEDURE — 82550 ASSAY OF CK (CPK): CPT | Performed by: FAMILY MEDICINE

## 2019-12-13 PROCEDURE — 84439 ASSAY OF FREE THYROXINE: CPT | Performed by: FAMILY MEDICINE

## 2019-12-13 PROCEDURE — 36415 COLL VENOUS BLD VENIPUNCTURE: CPT | Performed by: FAMILY MEDICINE

## 2019-12-13 PROCEDURE — 85303 CLOT INHIBIT PROT C ACTIVITY: CPT | Performed by: FAMILY MEDICINE

## 2019-12-13 PROCEDURE — 83010 ASSAY OF HAPTOGLOBIN QUANT: CPT | Performed by: FAMILY MEDICINE

## 2019-12-13 PROCEDURE — 80050 GENERAL HEALTH PANEL: CPT | Performed by: FAMILY MEDICINE

## 2019-12-13 PROCEDURE — 83735 ASSAY OF MAGNESIUM: CPT | Performed by: FAMILY MEDICINE

## 2019-12-13 PROCEDURE — 82330 ASSAY OF CALCIUM: CPT | Performed by: FAMILY MEDICINE

## 2019-12-13 PROCEDURE — 00000402 ZZHCL STATISTIC TOTAL PROTEIN: Performed by: FAMILY MEDICINE

## 2019-12-13 PROCEDURE — 85610 PROTHROMBIN TIME: CPT | Performed by: FAMILY MEDICINE

## 2019-12-13 PROCEDURE — 99000 SPECIMEN HANDLING OFFICE-LAB: CPT | Performed by: FAMILY MEDICINE

## 2019-12-13 PROCEDURE — 85306 CLOT INHIBIT PROT S FREE: CPT | Performed by: FAMILY MEDICINE

## 2019-12-13 PROCEDURE — 85384 FIBRINOGEN ACTIVITY: CPT | Performed by: FAMILY MEDICINE

## 2019-12-13 PROCEDURE — 82043 UR ALBUMIN QUANTITATIVE: CPT | Performed by: FAMILY MEDICINE

## 2019-12-13 PROCEDURE — 99214 OFFICE O/P EST MOD 30 MIN: CPT | Performed by: FAMILY MEDICINE

## 2019-12-13 PROCEDURE — 84165 PROTEIN E-PHORESIS SERUM: CPT | Performed by: FAMILY MEDICINE

## 2019-12-13 PROCEDURE — 84597 ASSAY OF VITAMIN K: CPT | Mod: 90 | Performed by: FAMILY MEDICINE

## 2019-12-13 RX ORDER — OXYCODONE HYDROCHLORIDE 10 MG/1
10 TABLET ORAL EVERY 4 HOURS PRN
COMMUNITY
Start: 2019-12-18 | End: 2021-08-11

## 2019-12-13 RX ORDER — ASCORBIC ACID 125 MG
1 TABLET,CHEWABLE ORAL DAILY
COMMUNITY
End: 2021-12-14

## 2019-12-13 ASSESSMENT — MIFFLIN-ST. JEOR: SCORE: 1468.56

## 2019-12-13 NOTE — LETTER
[facility]  [address] [place] [address]    December 17, 2019    Tisha Arias  [address]  [place] [address]          Dear Tisha,    Your CK was very elevated.  This is a breakdown product of muscle cells,  Which likely represents your generalized muscle pain.  I'm not sure what caused this however as there are multiple possibilities.  This can be elevated in the setting of viral infections, and can also be raised by certain medications such as opiates,  Colchicine, anti-depressants,  Etc.  The fact that your CK level is decreasing and that it hasn't affected your kidney function is reassuring.  Please be sure to keep over-hydrated for the time being as this will help flush out your excess CK.     Enclosed is a copy of your results.     Results for orders placed or performed in visit on 12/13/19   Lipid panel reflex to direct LDL Fasting     Status: None   Result Value Ref Range    Cholesterol Canceled, Test credited <200 mg/dL    Triglycerides Canceled, Test credited <150 mg/dL    HDL Cholesterol Canceled, Test credited >49 mg/dL    LDL Cholesterol Calculated Canceled, Test credited 0 - 130 mg/dL    Non HDL Cholesterol Canceled, Test credited <130 mg/dL   TSH     Status: Abnormal   Result Value Ref Range    TSH 0.01 (L) 0.40 - 4.00 mU/L   T4, free     Status: Abnormal   Result Value Ref Range    T4 Free 1.53 (H) 0.76 - 1.46 ng/dL   CBC with platelets and differential     Status: None   Result Value Ref Range    WBC 5.6 4.0 - 11.0 10e9/L    RBC Count 4.49 3.8 - 5.2 10e12/L    Hemoglobin 14.5 11.7 - 15.7 g/dL    Hematocrit 44.1 35.0 - 47.0 %    MCV 98 78 - 100 fl    MCH 32.3 26.5 - 33.0 pg    MCHC 32.9 31.5 - 36.5 g/dL    RDW 11.5 10.0 - 15.0 %    Platelet Count 232 150 - 450 10e9/L    % Neutrophils 73.1 %    % Lymphocytes 15.8 %    % Monocytes 8.7 %    % Eosinophils 2.0 %    % Basophils 0.4 %    Absolute Neutrophil  4.1 1.6 - 8.3 10e9/L    Absolute Lymphocytes 0.9 0.8 - 5.3 10e9/L    Absolute Monocytes 0.5 0.0 - 1.3 10e9/L    Absolute Eosinophils 0.1 0.0 - 0.7 10e9/L    Absolute Basophils 0.0 0.0 - 0.2 10e9/L    Diff Method Automated Method    INR     Status: None   Result Value Ref Range    INR 1.00 0.86 - 1.14   Vitamin K     Status: None   Result Value Ref Range    Vitamin K 0.37 0.22 - 4.88 nmol/L   Calcium ionized     Status: Abnormal   Result Value Ref Range    Calcium Ionized 4.2 (L) 4.4 - 5.2 mg/dL   Albumin Random Urine Quantitative with Creat Ratio     Status: None   Result Value Ref Range    Creatinine Urine 95 mg/dL    Albumin Urine mg/L 7 mg/L    Albumin Urine mg/g Cr 7.23 0 - 25 mg/g Cr   Comprehensive metabolic panel     Status: Abnormal   Result Value Ref Range    Sodium 141 133 - 144 mmol/L    Potassium 3.5 3.4 - 5.3 mmol/L    Chloride 102 94 - 109 mmol/L    Carbon Dioxide 30 20 - 32 mmol/L    Anion Gap 9 3 - 14 mmol/L    Glucose 106 (H) 70 - 99 mg/dL    Urea Nitrogen 15 7 - 30 mg/dL    Creatinine 0.94 0.52 - 1.04 mg/dL    GFR Estimate 66 >60 mL/min/[1.73_m2]    GFR Estimate If Black 76 >60 mL/min/[1.73_m2]    Calcium 8.6 8.5 - 10.1 mg/dL    Bilirubin Total 0.4 0.2 - 1.3 mg/dL    Albumin 4.0 3.4 - 5.0 g/dL    Protein Total 7.7 6.8 - 8.8 g/dL    Alkaline Phosphatase 102 40 - 150 U/L    ALT 53 (H) 0 - 50 U/L    AST 89 (H) 0 - 45 U/L   Haptoglobin     Status: None   Result Value Ref Range    Haptoglobin 167 15 - 200 mg/dL   Magnesium     Status: None   Result Value Ref Range    Magnesium 2.2 1.6 - 2.3 mg/dL   Protein electrophoresis     Status: None   Result Value Ref Range    Albumin Fraction 4.4 3.7 - 5.1 g/dL    Alpha 1 Fraction 0.3 0.2 - 0.4 g/dL    Alpha 2 Fraction 0.8 0.5 - 0.9 g/dL    Beta Fraction 0.9 0.6 - 1.0 g/dL    Gamma Fraction 0.7 0.7 - 1.6 g/dL    Monoclonal Peak 0.0 0.0 g/dL    ELP Interpretation:       Essentially normal electrophoretic pattern.  No monoclonal protein seen. Pathologic    significance requires clinical correlation.  Jerry Maria M.D., Ph.D., Pathologist.     Protein C chromogenic     Status: Abnormal   Result Value Ref Range    Prot C Chromogenic 174 (H) 70 - 170 %   CK total     Status: Abnormal   Result Value Ref Range    CK Total 3,308 (HH) 30 - 225 U/L   Protein S Antigen Free     Status: Abnormal   Result Value Ref Range    Protein S Free 133 (H) 55 - 125 %   Fibrinogen activity     Status: None   Result Value Ref Range    Fibrinogen 348 200 - 420 mg/dL       If you have any questions or concerns, please call myself or my nurse at [phone].      Sincerely,        Pablo Soto MD/[facility]

## 2019-12-13 NOTE — PROGRESS NOTES
"Subjective     Tisha Arias is a 59 year old female who presents to clinic today for the following health issues:    HPI   Patient presents with:  Swelling: Edema is chest ( incision), both shoulders, arms and legs  Eye Problem: Vision changes  Patient Request: discuss pressure in throat and hot flashes, Migraine,   Derm Problem: petechiae  right arm and chest    Rash on right forearm x 1.5 months, improving, itchy, slightly warm, moisturizing.    Magnesium dose was increased on Monday    Arm and leg swelling - noted 5 days ago, felt  her skin was taught, has improved with her lasix    Muscle cramping/aching as well, mostly in abdomen, thighs, 1 month, unsure of cause    Hydrocodone to oxycodone  Vitamin k - to promote calcium absorption - started in October 100mg    Patient expresses that most of her symptoms were due to complications of tamoxifen, which she is now off.      BP Readings from Last 3 Encounters:   12/13/19 (!) 140/80   11/22/19 (!) 156/82   11/11/19 111/73    Wt Readings from Last 3 Encounters:   12/13/19 89.3 kg (196 lb 12.8 oz)   11/22/19 88.5 kg (195 lb 3.2 oz)   11/11/19 86.2 kg (190 lb)                      Reviewed and updated as needed this visit by Provider  Tobacco  Allergies  Meds  Problems  Med Hx  Surg Hx  Fam Hx         Review of Systems   ROS COMP: Constitutional, HEENT, cardiovascular, pulmonary, gi and gu systems are negative, except as otherwise noted.      Objective    BP (!) 140/80   Pulse 135   Temp 98.5  F (36.9  C) (Oral)   Resp 24   Ht 1.651 m (5' 5\")   Wt 89.3 kg (196 lb 12.8 oz)   SpO2 96%   BMI 32.75 kg/m    Body mass index is 32.75 kg/m .  Physical Exam   GENERAL: healthy, alert and no distress  EYES: Eyes grossly normal to inspection, PERRL and conjunctivae and sclerae normal  HENT: ear canals and TM's normal, nose and mouth without ulcers or lesions  NECK: no adenopathy, no asymmetry, masses, or scars and thyroid normal to palpation  RESP: lungs " clear to auscultation - no rales, rhonchi or wheezes  CV: regular rate and rhythm, normal S1 S2, no S3 or S4, no murmur, click or rub, no peripheral edema and peripheral pulses strong  SKIN: petechial rash right arm  NEURO: Normal strength and tone, mentation intact and speech normal  PSYCH: mentation appears normal, affect normal/bright          Assessment & Plan       ICD-10-CM    1. Petechial rash R23.3 Lipid panel reflex to direct LDL Fasting     TSH     T4, free     CBC with platelets and differential     INR     Vitamin K     Calcium ionized     Albumin Random Urine Quantitative with Creat Ratio     Comprehensive metabolic panel     Haptoglobin     Magnesium     Protein electrophoresis     Protein C chromogenic     Protein S Antigen Free     Fibrinogen activity     Lipid panel reflex to direct LDL Fasting   2. Adverse effect of tamoxifen, sequela T38.6X5S Lipid panel reflex to direct LDL Fasting     TSH     T4, free     CBC with platelets and differential     INR     Vitamin K     Calcium ionized     Albumin Random Urine Quantitative with Creat Ratio     Comprehensive metabolic panel     Haptoglobin     Magnesium     Protein electrophoresis     Protein C chromogenic     Lipid panel reflex to direct LDL Fasting   3. Hypoparathyroidism, unspecified hypoparathyroidism type (H) E20.9 Lipid panel reflex to direct LDL Fasting     TSH     T4, free     CBC with platelets and differential     INR     Vitamin K     Calcium ionized     Albumin Random Urine Quantitative with Creat Ratio     Comprehensive metabolic panel     Haptoglobin     Magnesium     Protein electrophoresis     Protein C chromogenic     Lipid panel reflex to direct LDL Fasting   4. Osteoporosis, unspecified osteoporosis type, unspecified pathological fracture presence M81.0    5. Muscular aches M79.10 CK total            Follow-up in 1 month  Pablo Zarco MD  [facility]

## 2019-12-14 NOTE — TELEPHONE ENCOUNTER
PC from lab re:  Elevated CK level on testing.      Review of labs indicates normal kidney function, no CBC, normal bili, elevated transaminases - borderline.  Office note from todays visit not completed    PC to patient:  Patient reports severe muscle cramping that will occur intermittently,no other symptoms.  Drinking fluids well urinating normally.  Yellow urine.      Recommend:  Push fluids. Follow up tomorrow if increased pain other localized symptoms.  Has fasting labs planned for Sunday - will add CK level for follow up lab and forward to Dr. Soto when available.    Michelle Hernandez MD

## 2019-12-15 DIAGNOSIS — E20.9 HYPOPARATHYROIDISM, UNSPECIFIED HYPOPARATHYROIDISM TYPE (H): ICD-10-CM

## 2019-12-15 DIAGNOSIS — R23.3 PETECHIAL RASH: ICD-10-CM

## 2019-12-15 DIAGNOSIS — T38.6X5S ADVERSE EFFECT OF TAMOXIFEN, SEQUELA: ICD-10-CM

## 2019-12-15 DIAGNOSIS — R74.8 ELEVATED CK: ICD-10-CM

## 2019-12-15 PROCEDURE — 80061 LIPID PANEL: CPT | Performed by: FAMILY MEDICINE

## 2019-12-15 PROCEDURE — 36415 COLL VENOUS BLD VENIPUNCTURE: CPT | Performed by: FAMILY MEDICINE

## 2019-12-15 PROCEDURE — 82550 ASSAY OF CK (CPK): CPT | Performed by: FAMILY MEDICINE

## 2019-12-16 ENCOUNTER — TELEPHONE (OUTPATIENT)
Dept: ENDOCRINOLOGY | Facility: CLINIC | Age: 59
End: 2019-12-16

## 2019-12-16 LAB
ALBUMIN SERPL ELPH-MCNC: 4.4 G/DL (ref 3.7–5.1)
ALPHA1 GLOB SERPL ELPH-MCNC: 0.3 G/DL (ref 0.2–0.4)
ALPHA2 GLOB SERPL ELPH-MCNC: 0.8 G/DL (ref 0.5–0.9)
B-GLOBULIN SERPL ELPH-MCNC: 0.9 G/DL (ref 0.6–1)
CHOLEST SERPL-MCNC: 245 MG/DL
CK SERPL-CCNC: 1694 U/L (ref 30–225)
GAMMA GLOB SERPL ELPH-MCNC: 0.7 G/DL (ref 0.7–1.6)
HAPTOGLOB SERPL-MCNC: 167 MG/DL (ref 15–200)
HDLC SERPL-MCNC: 69 MG/DL
LDLC SERPL CALC-MCNC: 146 MG/DL
M PROTEIN SERPL ELPH-MCNC: 0 G/DL
NONHDLC SERPL-MCNC: 176 MG/DL
PHYTONADIONE SERPL-MCNC: 0.37 NMOL/L (ref 0.22–4.88)
PROT PATTERN SERPL ELPH-IMP: NORMAL
TRIGL SERPL-MCNC: 148 MG/DL

## 2019-12-16 NOTE — TELEPHONE ENCOUNTER
Prior Authorization Retail Medication Request    Medication/Dose: CALCITRIOL  0.25 MCG  TAKES O.5 MCG am AND 0.25 MCG pm   ICD code (if different than what is on RX):  POST SURGICAL  HYPOPARATHYROIDISM WITH  HYPOCALCEMIA  Previously Tried and Failed:  Calcium and calcium rich foods   Rationale: The calcitriol works with his calcium supplement and calcium rich food . She has a HX of gastric bypass and  Is not absorbing the calcium. Have tried to get her off the calcitriol but are unable.  When off she has severe muscle   cramping .    Hypocalcemia with normal PTH. She has low albumin.   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   We have been unable to get her off calcitriol  and calcium in the past.           History of rachael en Y gastric bypass is potentially going to affect absorption of the calcium/ vitamin D/ L-T4. I believe this is an important history relative to the hypoparathyoridsim     Kidney stones with recent requirement for stent placement - not directly addressed though I have again shared my concern relative to her calcium/calcitriol dosing.       Insurance Name:   Insurance ID:    She is out so marking this urgent Татьяна Brenner RN on 12/16/2019 at 4:37 PM    Pharmacy Information (if different than what is on RX)  Name:  Phone:

## 2019-12-17 ENCOUNTER — MYC MEDICAL ADVICE (OUTPATIENT)
Dept: FAMILY MEDICINE | Facility: CLINIC | Age: 59
End: 2019-12-17

## 2019-12-17 LAB
PROT C ACT/NOR PPP CHRO: 174 % (ref 70–170)
PROT S FREE AG ACT/NOR PPP IA: 133 % (ref 55–125)

## 2019-12-17 NOTE — TELEPHONE ENCOUNTER
I spoke to Claudia Galindo at the pharmacy. She states they did not send a PA request for this. She processed it and has a paid claim. She's not sure why I got this request.

## 2019-12-17 NOTE — TELEPHONE ENCOUNTER
See mychart below. Patient also added: I have not taken colrys or Celebrex in more than 4 weeks, diazepam more than 2 months.    Sera Mtz RN

## 2019-12-19 NOTE — TELEPHONE ENCOUNTER
Patient notified of providers message as written.  Patient verbalized understanding, no further questions or concerns.  She did say she is also waiting for a call from oncology.     Sera Mtz RN

## 2019-12-19 NOTE — TELEPHONE ENCOUNTER
I recommend she go to the ED to have a complete workup done given her vision changes, shortness of breath/substernal chest pressure, swelling, etc.    Pablo Zarco MD

## 2019-12-20 ENCOUNTER — MYC MEDICAL ADVICE (OUTPATIENT)
Dept: ONCOLOGY | Facility: CLINIC | Age: 59
End: 2019-12-20

## 2019-12-23 ENCOUNTER — COMMUNICATION - HEALTHEAST (OUTPATIENT)
Dept: PALLIATIVE MEDICINE | Facility: OTHER | Age: 59
End: 2019-12-23

## 2019-12-23 DIAGNOSIS — G89.29 OTHER CHRONIC PAIN: ICD-10-CM

## 2019-12-24 ENCOUNTER — TELEPHONE (OUTPATIENT)
Dept: ONCOLOGY | Facility: CLINIC | Age: 59
End: 2019-12-24

## 2019-12-24 NOTE — TELEPHONE ENCOUNTER
"Writer placed call to patient to assess current symptoms and advise that no appointment is available in clinic with SANJANA's until January 6. Patient stated numerous concerning symptoms that have previously been reported but have been escalating and advice was previously given to go in to the ER for evaluation. Patient emphatically states that she's \"not going to wait in an ER for 12 hours to be seen only by 2 people\".  Writer stated that we would certainly still advise that she go in but would support her personal choice and a message would be routed to her care team to follow up on Thursday if anything could be arranged for a sooner appointment.  "

## 2020-01-06 ENCOUNTER — RECORDS - HEALTHEAST (OUTPATIENT)
Dept: ADMINISTRATIVE | Facility: OTHER | Age: 60
End: 2020-01-06

## 2020-01-06 ENCOUNTER — ONCOLOGY VISIT (OUTPATIENT)
Dept: ONCOLOGY | Facility: CLINIC | Age: 60
End: 2020-01-06
Attending: INTERNAL MEDICINE
Payer: COMMERCIAL

## 2020-01-06 ENCOUNTER — COMMUNICATION - HEALTHEAST (OUTPATIENT)
Dept: PALLIATIVE MEDICINE | Facility: OTHER | Age: 60
End: 2020-01-06

## 2020-01-06 VITALS
BODY MASS INDEX: 33.39 KG/M2 | TEMPERATURE: 98.3 F | WEIGHT: 200.4 LBS | SYSTOLIC BLOOD PRESSURE: 169 MMHG | HEIGHT: 65 IN | DIASTOLIC BLOOD PRESSURE: 99 MMHG | HEART RATE: 127 BPM | OXYGEN SATURATION: 100 % | RESPIRATION RATE: 16 BRPM

## 2020-01-06 DIAGNOSIS — R07.1 CHEST PAIN ON BREATHING: Primary | ICD-10-CM

## 2020-01-06 DIAGNOSIS — C83.30 DIFFUSE LARGE B-CELL LYMPHOMA, UNSPECIFIED BODY REGION (H): ICD-10-CM

## 2020-01-06 DIAGNOSIS — G89.29 OTHER CHRONIC PAIN: ICD-10-CM

## 2020-01-06 LAB
ALBUMIN SERPL-MCNC: 3.8 G/DL (ref 3.4–5)
ALP SERPL-CCNC: 105 U/L (ref 40–150)
ALT SERPL W P-5'-P-CCNC: 35 U/L (ref 0–50)
ANION GAP SERPL CALCULATED.3IONS-SCNC: 5 MMOL/L (ref 3–14)
AST SERPL W P-5'-P-CCNC: 27 U/L (ref 0–45)
BASOPHILS # BLD AUTO: 0 10E9/L (ref 0–0.2)
BASOPHILS NFR BLD AUTO: 0.7 %
BILIRUB SERPL-MCNC: 0.3 MG/DL (ref 0.2–1.3)
BUN SERPL-MCNC: 19 MG/DL (ref 7–30)
CALCIUM SERPL-MCNC: 9.1 MG/DL (ref 8.5–10.1)
CHLORIDE SERPL-SCNC: 101 MMOL/L (ref 94–109)
CK SERPL-CCNC: 83 U/L (ref 30–225)
CO2 SERPL-SCNC: 33 MMOL/L (ref 20–32)
CREAT SERPL-MCNC: 1.06 MG/DL (ref 0.52–1.04)
DIFFERENTIAL METHOD BLD: NORMAL
EOSINOPHIL # BLD AUTO: 0.2 10E9/L (ref 0–0.7)
EOSINOPHIL NFR BLD AUTO: 3.8 %
ERYTHROCYTE [DISTWIDTH] IN BLOOD BY AUTOMATED COUNT: 11 % (ref 10–15)
GFR SERPL CREATININE-BSD FRML MDRD: 57 ML/MIN/{1.73_M2}
GLUCOSE SERPL-MCNC: 105 MG/DL (ref 70–99)
HCT VFR BLD AUTO: 44 % (ref 35–47)
HGB BLD-MCNC: 14.8 G/DL (ref 11.7–15.7)
IMM GRANULOCYTES # BLD: 0 10E9/L (ref 0–0.4)
IMM GRANULOCYTES NFR BLD: 0.4 %
LDH SERPL L TO P-CCNC: 247 U/L (ref 81–234)
LYMPHOCYTES # BLD AUTO: 1 10E9/L (ref 0.8–5.3)
LYMPHOCYTES NFR BLD AUTO: 17.8 %
MCH RBC QN AUTO: 32.5 PG (ref 26.5–33)
MCHC RBC AUTO-ENTMCNC: 33.6 G/DL (ref 31.5–36.5)
MCV RBC AUTO: 97 FL (ref 78–100)
MONOCYTES # BLD AUTO: 0.5 10E9/L (ref 0–1.3)
MONOCYTES NFR BLD AUTO: 8.6 %
NEUTROPHILS # BLD AUTO: 3.8 10E9/L (ref 1.6–8.3)
NEUTROPHILS NFR BLD AUTO: 68.7 %
NRBC # BLD AUTO: 0 10*3/UL
NRBC BLD AUTO-RTO: 0 /100
NT-PROBNP SERPL-MCNC: 29 PG/ML (ref 0–125)
PLATELET # BLD AUTO: 231 10E9/L (ref 150–450)
POTASSIUM SERPL-SCNC: 3.8 MMOL/L (ref 3.4–5.3)
PROT SERPL-MCNC: 7.8 G/DL (ref 6.8–8.8)
RBC # BLD AUTO: 4.55 10E12/L (ref 3.8–5.2)
SODIUM SERPL-SCNC: 140 MMOL/L (ref 133–144)
TROPONIN I SERPL-MCNC: <0.015 UG/L (ref 0–0.04)
URATE SERPL-MCNC: 7.2 MG/DL (ref 2.6–6)
WBC # BLD AUTO: 5.6 10E9/L (ref 4–11)

## 2020-01-06 PROCEDURE — 36415 COLL VENOUS BLD VENIPUNCTURE: CPT

## 2020-01-06 PROCEDURE — 84550 ASSAY OF BLOOD/URIC ACID: CPT | Performed by: INTERNAL MEDICINE

## 2020-01-06 PROCEDURE — 83615 LACTATE (LD) (LDH) ENZYME: CPT | Performed by: INTERNAL MEDICINE

## 2020-01-06 PROCEDURE — 82550 ASSAY OF CK (CPK): CPT | Performed by: INTERNAL MEDICINE

## 2020-01-06 PROCEDURE — 85025 COMPLETE CBC W/AUTO DIFF WBC: CPT | Performed by: INTERNAL MEDICINE

## 2020-01-06 PROCEDURE — 93010 ELECTROCARDIOGRAM REPORT: CPT | Performed by: INTERNAL MEDICINE

## 2020-01-06 PROCEDURE — 80053 COMPREHEN METABOLIC PANEL: CPT | Performed by: INTERNAL MEDICINE

## 2020-01-06 PROCEDURE — 82784 ASSAY IGA/IGD/IGG/IGM EACH: CPT | Performed by: INTERNAL MEDICINE

## 2020-01-06 PROCEDURE — 84484 ASSAY OF TROPONIN QUANT: CPT | Performed by: INTERNAL MEDICINE

## 2020-01-06 PROCEDURE — G0463 HOSPITAL OUTPT CLINIC VISIT: HCPCS | Mod: ZF

## 2020-01-06 PROCEDURE — 99214 OFFICE O/P EST MOD 30 MIN: CPT | Mod: ZP | Performed by: INTERNAL MEDICINE

## 2020-01-06 PROCEDURE — 83880 ASSAY OF NATRIURETIC PEPTIDE: CPT | Performed by: INTERNAL MEDICINE

## 2020-01-06 ASSESSMENT — MIFFLIN-ST. JEOR: SCORE: 1484.89

## 2020-01-06 ASSESSMENT — PAIN SCALES - GENERAL: PAINLEVEL: SEVERE PAIN (6)

## 2020-01-06 NOTE — NURSING NOTE
Labs drawn on patient per provider request. Patient tolerated blood draw without any incident.     See Flowsheets.     Leilani Camarena CMA ([facility]) January 6, 2020 4:49 PM

## 2020-01-06 NOTE — PROGRESS NOTES
January 6, 2020    REASON FOR VISIT:  F/u breast cancer and thyroid cancer and lymphoma     HISTORY OF PRESENT ILLNESS:  Tisha Arias is a 58 year-old woman, postmenopausal, with a history of T1c N0 M0, infiltrating ductal carcinoma of the right breast with a strong family history of breast cancer as well as thyroid cancer and a history of DLBCL. BRCA1 and 2 gene testing negative.    Cancer Treatment History for her breast cancer and DLBCL:  -She was diagnosed with breast cancer in 12/2011.    -She underwent bilateral mastectomy with immediate reconstruction on 01/30/2012 by Dr. Daugherty.  Her final pathology revealed 1.6-cm, infiltrating ductal carcinoma, grade 1/3, no angiolymphatic space invasion, no nipple or skin invasion.  There was associated DCIS, and the margins were free of tumor superiorly, anterior margin by 0.1 cm, and widely free at remaining margins.  This was ER/NM-positive, HER2-negative in the vast majority of cells and non-amplified with a ratio of 1.1.  She had an Oncotype test performed, which revealed a low-risk score of 11.  That put her overall risk of recurrence at about 7% after 5 years of tamoxifen.    - in 02/2012, she was started on Arimidex.    - 12/2012, she had prophylactic hysterectomy and oophorectomy, the pathology of which was benign.    - Her course has been complicated by extreme chest wall pain, myofascial pain, and neuropathic pain.  She has gone through several different clinics and was put on several different medications, which were not doing her any good.  Eventually she was weaned off all the medications and is currently doing only PT with myofascial pain maneuvers with symptomatic improvement.   - 2/2014 switched from arimidex to tamoxifen  - presented to the ER on 9/1/17 for chest pain. CT-PA was negative for PE but showed right 3cm paraspinal mass and subcarinal adenopathy. PET on 9/6/17 showed the paraspinal mass to be hypermetabolic along with hilar and  mediastinal nodes.   - biopsy of T10 vertebral body on 9/15/17 showed high-grade B cell lymphoma   - FNA EUS of subcarinal LN on 9/14 showed mostly necrotic tissue with suspicion for malignancy   - pt received DA-EPOCH under the care of Dr Graima Sauceda    Thyroid cancer history: Incidentally, given that she was having so many symptoms, she underwent a PET scan in 4/2013, which revealed a thyroid nodule which was biopsied and was consistent with papillary carcinoma.  She has undergone resection as well as radioiodine treatment since and has done relatively well from the surgery.  She follows up with Dr. Castro for the same.  She unfortunately required etoh ablation therapy over the thyroid lymph nodes.   She remains under the care of Dr Avelina Castro.       INTERVAL HISTORY: In returning today, Elvin comes in today for follow up.  She went in for evaluation with her primary last month with complaints of arthralgias and myalgias and petechiae.  She was found to have an elevated CK > 3000.  She was recommended to go into the ER.  She thought this was the same pain she had been having for many months and did not go in.        She was previously having arthralgias in hips, knees, across chest.  She stopped tamoxifen.  She saw a pain specialist, and these symptoms are improved though she still has intermittent episodes of muscle spasms and cramping.      She however has had a petechial rash last month.  No trauma.  No nsaids.  No insect bites.  No travel.  No flu like symptoms with the rash.  No nausea.  No vomiting.  No diarrhea.    + low grade temps.  + drenching sweats.  Weight is stable.      Energy wise - energy is low compared to last year.      She is using flexeril and voltaren gel for her pain.  She stopped colcyrs.  She is using ketamine trochars. She is being seen by Dr Berry in  for these medications.       She also complains of visual changes.      Her 10-point review of systems is otherwise  "negative.     MEDICATIONS:  reviewed     PHYSICAL EXAMINATION:    VITAL SIGNS:BP (!) 169/99 (BP Location: Left arm, Patient Position: Chair, Cuff Size: Adult Regular)   Pulse 127   Temp 98.3  F (36.8  C)   Resp 16   Ht 1.651 m (5' 5\")   Wt 90.9 kg (200 lb 6.4 oz)   SpO2 100%   BMI 33.35 kg/m    Vitals in chart and reviewed  GENERAL:  She is well-appearing, appears fatigued   HEENT:  Exam reveals no icterus or pallor.  Oropharynx is clear with no ulcers or lesions.    NECK:  Supple.  No supraclavicular or cervical lymphadenopathy.  She has a scar from thyroid surgery.     HEART:  Regular rate and rhythm.  S1, S2 clear.  No murmur, gallop or rub.    LUNGS:  Clear to auscultation bilaterally.    ABDOMEN:  Soft, nontender, nondistended, no organomegaly.    BREAST EXAM:    No supraclavicular or axillary adenopathy appreciated.  Chest wall with reproducible tenderness on palpation.  No nodules or masses.  She has had her implants removed since I last saw her.  MUSC: no reproducible spine tenderness  EXTREMITIES:  She has no lower extremity edema. , ambulating without difficulty  SKIN: no rash.  No petechae     Labs:     EKG: sinus tachycardia.  Unchanged from EKG 12/ 2018 - personally reviewed by me.    Remainder labs pending    ASSESSMENT AND PLAN:      DLBCL: dx Sept 2017 with thoracic paravertebral soft tissue mass and mediastinal nodes. overexpression of c-myc and high mitotic index (double-hit like). Staging LP and BM bx negative. S/p DA-R-EPOCH.  She is continuing to see Dr Garima Sauceda for mgmt of her lymphoma.  She has f/u later this month.     Stage I, ER/IA-positive, HER2-negative breast cancer: dx in 2011. Oncotype recurrence score of 11 (7% recurrence risk after 5 years of tamoxifen). S/p bilateral mastectomies and BENJIE/BSO. For endocrine therapy she was on arimidex from 2426-3393, then changed to tamoxifen b/c of arthralgias on Arimidex.  She remained on this until mid 2019 at which time we " stopped it concerned that she may have myalgias from it.      She remains off of tamoxifen with no s/s of recurrence.      Papillary thyroid cancer, it is being followed by Dr. Avelina Castro.  She underwent etoh ablation x3.        Synchronous thyroid cancer and breast cancer.  She is brca 1 and 2 negative.  Additional testing is negative.        Elevated CK of unclear etiology - repeat testing today. She has had no bites, no supplemental or herbal medications, no trauma that she recollects.  Will repeat today.  I discussed with patient that I will call her with the results later today.      Chest pain - EKG stable.  Await troponin and BNP.         Shahla Crawley MD    Addendum:  CK normal at 83.    Will follow.  I recommended she follow up with Dr Sauceda later this month as is scheduled.  Given she is no longer on tamoxifen, I advised yearly follow up with me.    Shahla Crawley

## 2020-01-06 NOTE — NURSING NOTE
"Oncology Rooming Note    January 6, 2020 4:06 PM   Tisha Arias is a 59 year old female who presents for:    Chief Complaint   Patient presents with     Oncology Clinic Visit     [facility] RETURN- BREAST CA     Initial Vitals: BP (!) 169/99 (BP Location: Left arm, Patient Position: Chair, Cuff Size: Adult Regular)   Pulse 127   Temp 98.3  F (36.8  C)   Resp 16   Ht 1.651 m (5' 5\")   Wt 90.9 kg (200 lb 6.4 oz)   SpO2 100%   BMI 33.35 kg/m   Estimated body mass index is 33.35 kg/m  as calculated from the following:    Height as of this encounter: 1.651 m (5' 5\").    Weight as of this encounter: 90.9 kg (200 lb 6.4 oz). Body surface area is 2.04 meters squared.  Severe Pain (6) Comment: Data Unavailable   No LMP recorded. Patient has had a hysterectomy.  Allergies reviewed: Yes  Medications reviewed: Yes    Medications: MEDICATION REFILLS NEEDED TODAY. Provider was notified.  Pharmacy name entered into [employer]: [id number] - [place], [place] - [address] [facility]    Clinical concerns: Refill on epipen Dr. Crawley was notified.      Corey Driver LPN            "

## 2020-01-06 NOTE — LETTER
1/6/2020       RE: Tisha Arias  [address] [place] [address]     Dear Colleague,    Thank you for referring your patient, Tisha Arias, to the [facility]. Please see a copy of my visit note below.    January 6, 2020    REASON FOR VISIT:  F/u breast cancer and thyroid cancer and lymphoma     HISTORY OF PRESENT ILLNESS:  Tisha Arias is a 58 year-old woman, postmenopausal, with a history of T1c N0 M0, infiltrating ductal carcinoma of the right breast with a strong family history of breast cancer as well as thyroid cancer and a history of DLBCL. BRCA1 and 2 gene testing negative.    Cancer Treatment History for her breast cancer and DLBCL:  -She was diagnosed with breast cancer in 12/2011.    -She underwent bilateral mastectomy with immediate reconstruction on 01/30/2012 by Dr. Daugherty.  Her final pathology revealed 1.6-cm, infiltrating ductal carcinoma, grade 1/3, no angiolymphatic space invasion, no nipple or skin invasion.  There was associated DCIS, and the margins were free of tumor superiorly, anterior margin by 0.1 cm, and widely free at remaining margins.  This was ER/MS-positive, HER2-negative in the vast majority of cells and non-amplified with a ratio of 1.1.  She had an Oncotype test performed, which revealed a low-risk score of 11.  That put her overall risk of recurrence at about 7% after 5 years of tamoxifen.    - in 02/2012, she was started on Arimidex.    - 12/2012, she had prophylactic hysterectomy and oophorectomy, the pathology of which was benign.    - Her course has been complicated by extreme chest wall pain, myofascial pain, and neuropathic pain.  She has gone through several different clinics and was put on several different medications, which were not doing her any good.  Eventually she was weaned off all the medications and is currently doing only PT with myofascial pain maneuvers with symptomatic improvement.   - 2/2014 switched from  arimidex to tamoxifen  - presented to the ER on 9/1/17 for chest pain. CT-PA was negative for PE but showed right 3cm paraspinal mass and subcarinal adenopathy. PET on 9/6/17 showed the paraspinal mass to be hypermetabolic along with hilar and mediastinal nodes.   - biopsy of T10 vertebral body on 9/15/17 showed high-grade B cell lymphoma   - FNA EUS of subcarinal LN on 9/14 showed mostly necrotic tissue with suspicion for malignancy   - pt received DA-EPOCH under the care of Dr Garima Sauceda    Thyroid cancer history: Incidentally, given that she was having so many symptoms, she underwent a PET scan in 4/2013, which revealed a thyroid nodule which was biopsied and was consistent with papillary carcinoma.  She has undergone resection as well as radioiodine treatment since and has done relatively well from the surgery.  She follows up with Dr. Castro for the same.  She unfortunately required etoh ablation therapy over the thyroid lymph nodes.   She remains under the care of Dr Avelina Castro.       INTERVAL HISTORY: In returning today, Elvin comes in today for follow up.  She went in for evaluation with her primary last month with complaints of arthralgias and myalgias and petechiae.  She was found to have an elevated CK > 3000.  She was recommended to go into the ER.  She thought this was the same pain she had been having for many months and did not go in.        She was previously having arthralgias in hips, knees, across chest.  She stopped tamoxifen.  She saw a pain specialist, and these symptoms are improved though she still has intermittent episodes of muscle spasms and cramping.      She however has had a petechial rash last month.  No trauma.  No nsaids.  No insect bites.  No travel.  No flu like symptoms with the rash.  No nausea.  No vomiting.  No diarrhea.    + low grade temps.  + drenching sweats.  Weight is stable.      Energy wise - energy is low compared to last year.      She is using  "flexeril and voltaren gel for her pain.  She stopped colcyrs.  She is using ketamine trochars. She is being seen by Dr Berry in [place] for these medications.       She also complains of visual changes.      Her 10-point review of systems is otherwise negative.     MEDICATIONS:  reviewed     PHYSICAL EXAMINATION:    VITAL SIGNS:BP (!) 169/99 (BP Location: Left arm, Patient Position: Chair, Cuff Size: Adult Regular)   Pulse 127   Temp 98.3  F (36.8  C)   Resp 16   Ht 1.651 m (5' 5\")   Wt 90.9 kg (200 lb 6.4 oz)   SpO2 100%   BMI 33.35 kg/m     Vitals in chart and reviewed  GENERAL:  She is well-appearing, appears fatigued   HEENT:  Exam reveals no icterus or pallor.  Oropharynx is clear with no ulcers or lesions.    NECK:  Supple.  No supraclavicular or cervical lymphadenopathy.  She has a scar from thyroid surgery.     HEART:  Regular rate and rhythm.  S1, S2 clear.  No murmur, gallop or rub.    LUNGS:  Clear to auscultation bilaterally.    ABDOMEN:  Soft, nontender, nondistended, no organomegaly.    BREAST EXAM:    No supraclavicular or axillary adenopathy appreciated.  Chest wall with reproducible tenderness on palpation.  No nodules or masses.  She has had her implants removed since I last saw her.  MUSC: no reproducible spine tenderness  EXTREMITIES:  She has no lower extremity edema. , ambulating without difficulty  SKIN: no rash.  No petechae     Labs:     EKG: sinus tachycardia.  Unchanged from EKG 12/ 2018 - personally reviewed by me.    Remainder labs pending    ASSESSMENT AND PLAN:      DLBCL: dx Sept 2017 with thoracic paravertebral soft tissue mass and mediastinal nodes. overexpression of c-myc and high mitotic index (double-hit like). Staging LP and BM bx negative. S/p DA-R-EPOCH.  She is continuing to see Dr Garima Sauceda for mgmt of her lymphoma.  She has f/u later this month.     Stage I, ER/CT-positive, HER2-negative breast cancer: dx in 2011. Oncotype recurrence score of 11 (7% recurrence " risk after 5 years of tamoxifen). S/p bilateral mastectomies and BENJIE/BSO. For endocrine therapy she was on arimidex from 9721-8616, then changed to tamoxifen b/c of arthralgias on Arimidex.  She remained on this until mid 2019 at which time we stopped it concerned that she may have myalgias from it.      She remains off of tamoxifen with no s/s of recurrence.      Papillary thyroid cancer, it is being followed by Dr. Avelina Castro.  She underwent etoh ablation x3.        Synchronous thyroid cancer and breast cancer.  She is brca 1 and 2 negative.  Additional testing is negative.        Elevated CK of unclear etiology - repeat testing today. She has had no bites, no supplemental or herbal medications, no trauma that she recollects.  Will repeat today.  I discussed with patient that I will call her with the results later today.      Chest pain - EKG stable.  Await troponin and BNP.         Shahla Crawley MD    Addendum:  CK normal at 83.    Will follow.  I recommended she follow up with Dr Sauceda later this month as is scheduled.  Given she is no longer on tamoxifen, I advised yearly follow up with me.    Shahla Crawely

## 2020-01-07 LAB
IGA SERPL-MCNC: 152 MG/DL (ref 84–499)
IGG SERPL-MCNC: 806 MG/DL (ref 610–1616)
IGM SERPL-MCNC: 47 MG/DL (ref 35–242)
INTERPRETATION ECG - MUSE: NORMAL

## 2020-01-22 ENCOUNTER — COMMUNICATION - HEALTHEAST (OUTPATIENT)
Dept: PALLIATIVE MEDICINE | Facility: OTHER | Age: 60
End: 2020-01-22

## 2020-01-22 DIAGNOSIS — G89.4 CHRONIC PAIN SYNDROME: ICD-10-CM

## 2020-01-22 DIAGNOSIS — G89.29 OTHER CHRONIC PAIN: ICD-10-CM

## 2020-01-23 NOTE — TELEPHONE ENCOUNTER
"[facility] Telephone Triage Note    Assessment: Patient called in to triage reporting the following symptoms: throbing pain located low back (flank) and rated  severe, 10/10. Urine is normal in color. No dysuria or itching. Estimates intake 32mL today. Believes she was dehydrated yesterday. Chest pain, deep aching, rated 10/10. Also reports soreness to her sternum felt with breathing. Headache, rated 8/10, denies visual disturbances, no confusion, or sensitivity to light/sound. No difficulty with speech or memory. Little nauseous. Denies: fever/chills, SOB, flushing, itching, skin rash. Took Tylenol 1000mg yesterday evening. Today she has been taking oxycodone Q 4 hr and oxycontin. This has no effect on her pain level. She received a blood transfusion for the first time yesterday. She's concerned she's having a transfusion reaction.  She said to me \"Don't tell me to go to the ER\"     Recommendations: Paged Jessica Cox - in a meeting, then paged Dr. Sauceda.  awaiting response..    Follow-Up:       " medium

## 2020-01-29 ENCOUNTER — OFFICE VISIT (OUTPATIENT)
Dept: TRANSPLANT | Facility: CLINIC | Age: 60
End: 2020-01-29
Payer: COMMERCIAL

## 2020-01-29 ENCOUNTER — RECORDS - HEALTHEAST (OUTPATIENT)
Dept: ADMINISTRATIVE | Facility: OTHER | Age: 60
End: 2020-01-29

## 2020-01-29 ENCOUNTER — ANCILLARY PROCEDURE (OUTPATIENT)
Dept: GENERAL RADIOLOGY | Facility: CLINIC | Age: 60
End: 2020-01-29
Payer: COMMERCIAL

## 2020-01-29 ENCOUNTER — APPOINTMENT (OUTPATIENT)
Dept: LAB | Facility: CLINIC | Age: 60
End: 2020-01-29
Payer: COMMERCIAL

## 2020-01-29 VITALS
RESPIRATION RATE: 16 BRPM | HEART RATE: 95 BPM | SYSTOLIC BLOOD PRESSURE: 121 MMHG | DIASTOLIC BLOOD PRESSURE: 67 MMHG | BODY MASS INDEX: 33.32 KG/M2 | OXYGEN SATURATION: 98 % | WEIGHT: 200 LBS | HEIGHT: 65 IN | TEMPERATURE: 98.3 F

## 2020-01-29 DIAGNOSIS — R07.1 CHEST PAIN ON BREATHING: ICD-10-CM

## 2020-01-29 DIAGNOSIS — E89.0 POSTSURGICAL HYPOTHYROIDISM: ICD-10-CM

## 2020-01-29 DIAGNOSIS — C83.38 DIFFUSE LARGE B-CELL LYMPHOMA OF LYMPH NODES OF MULTIPLE REGIONS (H): Primary | ICD-10-CM

## 2020-01-29 DIAGNOSIS — C73 PAPILLARY CARCINOMA, FOLLICULAR VARIANT (H): ICD-10-CM

## 2020-01-29 LAB
ALBUMIN SERPL-MCNC: 3.5 G/DL (ref 3.4–5)
ALP SERPL-CCNC: 92 U/L (ref 40–150)
ALT SERPL W P-5'-P-CCNC: 27 U/L (ref 0–50)
ANION GAP SERPL CALCULATED.3IONS-SCNC: 3 MMOL/L (ref 3–14)
AST SERPL W P-5'-P-CCNC: 23 U/L (ref 0–45)
BILIRUB SERPL-MCNC: 0.3 MG/DL (ref 0.2–1.3)
BUN SERPL-MCNC: 18 MG/DL (ref 7–30)
CALCIUM SERPL-MCNC: 8.7 MG/DL (ref 8.5–10.1)
CHLORIDE SERPL-SCNC: 102 MMOL/L (ref 94–109)
CK SERPL-CCNC: 49 U/L (ref 30–225)
CO2 SERPL-SCNC: 34 MMOL/L (ref 20–32)
CREAT SERPL-MCNC: 1.25 MG/DL (ref 0.52–1.04)
GFR SERPL CREATININE-BSD FRML MDRD: 47 ML/MIN/{1.73_M2}
GLUCOSE SERPL-MCNC: 120 MG/DL (ref 70–99)
LDH SERPL L TO P-CCNC: 218 U/L (ref 81–234)
POTASSIUM SERPL-SCNC: 4.1 MMOL/L (ref 3.4–5.3)
PROT SERPL-MCNC: 6.9 G/DL (ref 6.8–8.8)
SODIUM SERPL-SCNC: 139 MMOL/L (ref 133–144)
T4 FREE SERPL-MCNC: 1.35 NG/DL (ref 0.76–1.46)
TSH SERPL DL<=0.005 MIU/L-ACNC: 0.01 MU/L (ref 0.4–4)

## 2020-01-29 PROCEDURE — 80053 COMPREHEN METABOLIC PANEL: CPT

## 2020-01-29 PROCEDURE — G0463 HOSPITAL OUTPT CLINIC VISIT: HCPCS | Mod: ZF

## 2020-01-29 PROCEDURE — 84443 ASSAY THYROID STIM HORMONE: CPT

## 2020-01-29 PROCEDURE — 84439 ASSAY OF FREE THYROXINE: CPT

## 2020-01-29 PROCEDURE — 82550 ASSAY OF CK (CPK): CPT

## 2020-01-29 PROCEDURE — 83615 LACTATE (LD) (LDH) ENZYME: CPT

## 2020-01-29 PROCEDURE — 36415 COLL VENOUS BLD VENIPUNCTURE: CPT

## 2020-01-29 ASSESSMENT — MIFFLIN-ST. JEOR: SCORE: 1483.07

## 2020-01-29 ASSESSMENT — PAIN SCALES - GENERAL: PAINLEVEL: EXTREME PAIN (8)

## 2020-01-29 NOTE — NURSING NOTE
Chief Complaint   Patient presents with     Blood Draw     Labs drawn via  by RN in lab. VS taken.      Gerri Andres RN,

## 2020-01-29 NOTE — NURSING NOTE
"Oncology Rooming Note    January 29, 2020 4:09 PM   Tisha Arias is a 59 year old female who presents for:    Chief Complaint   Patient presents with     Blood Draw     Labs drawn via  by RN in lab. VS taken.      Oncology Clinic Visit     Return: Diffuse large B cell lymphoma      Initial Vitals: /67 (BP Location: Right arm, Patient Position: Sitting, Cuff Size: Adult Regular)   Pulse 95   Temp 98.3  F (36.8  C) (Oral)   Resp 16   Ht 1.651 m (5' 5\")   Wt 90.7 kg (200 lb)   SpO2 98%   BMI 33.28 kg/m   Estimated body mass index is 33.28 kg/m  as calculated from the following:    Height as of this encounter: 1.651 m (5' 5\").    Weight as of this encounter: 90.7 kg (200 lb). Body surface area is 2.04 meters squared.  Extreme Pain (8) Comment: Data Unavailable   No LMP recorded. Patient has had a hysterectomy.  Allergies reviewed: Yes  Medications reviewed: No  Medication review not completed because provider came in to see patient.  Medications: Medication refills not needed today.  Pharmacy name entered into [employer]: [id number] - [place], [place] - [address] [facility] [place]    Clinical concerns: N/A        Marlena Jimenez CMA              "

## 2020-01-31 ENCOUNTER — COMMUNICATION - HEALTHEAST (OUTPATIENT)
Dept: PALLIATIVE MEDICINE | Facility: OTHER | Age: 60
End: 2020-01-31

## 2020-01-31 DIAGNOSIS — G89.4 CHRONIC PAIN SYNDROME: ICD-10-CM

## 2020-02-02 NOTE — PROGRESS NOTES
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 Dg 9/2017 S/p  DA-R-EPOCH x6 (completed 1/2018). Now in CR. 2-years anniversary.     INTERVAL HISTORY: Overall, Elvin is feeling ok, works and energy is good. She does complains of sternal chest pain which comes and goes, but mostly there during the rest, no exertional component. No SOB or HEIN, no night sweats, she has a good appetite and no GERD weight loss or N/V/D.   Overall, her body aches are much better once she stopped Tamoxifen.     PMHx:  - breast cancer in 2011 s/p bilateral mastectomies and BENJIE/BSO up until recently on Tamoxifen  - severe body aches secondary to Tamoxifen  - Hx of papillary thyroid cancer s/p total thyroidectomy  - chronic chest wall pain  - asthma.      ONC HISTORY:    8/2017 - dramatically worse chest and back pain. CT 9/1/17 showed lytic lesion right 10th rib extending to right T9-10 neural foramen with vertebral body invasion. There was associated conglomerate of lymphadenopathy around the mid T-spine. She had a CT guided biopsy showing B-cell high grade lymphoma. CD79a positive and focally weakly CD30 positive, pos for CD20, BCL6, BCL2 and MUM1, and CD10 and CD21 negative. Ki-67 proliferative index is greater than 90-95%. The immunohistochemistry is suggestive of non-GCB immunophenotype of diffuse large B-cell lymphoma. The cytogenetics did not show rearrangement of the BCL2 or c-MYC. There is the presence of BCL6 rearrangement in 78% of cells and gains of MYC and BCL2, but no amplifications.\"     Staging LP and BMBx were negative for lymphoma.   She started DA-REPOCH 10/6/17. She did well with chemo other than rigors during CIVI.  Post cycle 1 complicated by mucositis and worsening of chronic LE peripheral edema.   Cycle 2 on " 10/27/17. Due to a rise in her LD and uric acid levels on that admission day, she underwent a CT scan which showed response to therapy with improvement in her mediastinal lymphadenopathy and right chest wall mass.  Cycle 3 on 11/16/2017      Cycle 4 12/7/2017  C5,6 end of January/2018    Interval History:  She reports that she is well with no major changes in her health. Is working on opiod dependency with her pain management team.     No lumps or abdominal symptoms.   ROS negative.       Current Outpatient Medications   Medication Sig Dispense Refill     ACAI RAMIREZ PO Take 50 mg by mouth       acetaminophen (TYLENOL) 325 MG tablet Take 2 tablets (650 mg) by mouth every 4 hours as needed for mild pain or fever 100 tablet      albuterol (VENTOLIN HFA) 108 (90 Base) MCG/ACT inhaler Inhale 1-2 puffs into the lungs every 4 hours as needed for shortness of breath / dyspnea or wheezing 18 g 1     aluminum chloride (DRYSOL) 20 % external solution Apply topically At Bedtime 60 mL 3     bisacodyl (DULCOLAX) 5 MG EC tablet Take 3 tablets (15 mg) by mouth daily as needed for constipation (Patient not taking: Reported on 1/6/2020) 30 tablet 0     calcitRIOL (ROCALTROL) 0.25 MCG capsule 0.5 mcg AM and 0.25 mcg  capsule 3     calcium carbonate antacid (TUMS ULTRA 1000) 1000 MG CHEW Take 1 tablet (1,000 mg) by mouth 4 times daily as needed (severe muscle cramps)       calcium citrate-vitamin D (CALCIUM CITRATE + D3) 315-250 MG-UNIT TABS per tablet Take 2 tablets by mouth 2 times daily 60 tablet 2     celecoxib (CELEBREX) 200 MG capsule Take 1 capsule (200 mg) by mouth 2 times daily (Patient not taking: Reported on 1/6/2020) 60 capsule 2     cetirizine (ZYRTEC ALLERGY) 10 MG tablet Take 1 tablet (10 mg) by mouth 3 times daily On hold for lab test. 90 tablet 0     Cholecalciferol (VITAMIN D3) 2000 units CAPS Take 2,000 Units by mouth daily 90 capsule 1     COMPOUND CONTAINING CONTROLLED SUBSTANCE (CMPD RX) - PHARMACY TO MIX  COMPOUNDED MEDICATION Ketamine 6%, Lidocaine 10% in PLO    Apply small amount to area BID  g 0     cromolyn (OPTICROM) 4 % ophthalmic solution Place 1 drop into both eyes 4 times daily 10 mL 0     cromolyn sodium (NASALCROM) 5.2 MG/ACT AERS Inhaler SPRAY ONE SPRAY( 1 ML) IN NOSTRIL DAILY 1 Bottle 6     [employer] FIBER GUMMY BEARS CHILDREN CHEW Take 5 g by mouth daily (2 gummy= 5 g =1 serving) (Patient not taking: Reported on 1/6/2020) 120 tablet 3     cyclobenzaprine (FLEXERIL) 10 MG tablet Take 1 tablet (10 mg) by mouth 3 times daily as needed for muscle spasms 30 tablet 2     diazepam (VALIUM) 5 MG tablet Take 1 tablet (5 mg) by mouth nightly as needed for anxiety (Patient not taking: Reported on 12/13/2019) 20 tablet 0     diazepam (VALIUM) 5 MG tablet TAKE ONE TABLET ( 5 MG) BY MOUTH THREE TIMES DAILY AS NEEDED FOR MUSCLE SPASMS. FILL 10/08/2018 90 tablet 0     diclofenac (VOLTAREN) 1 % topical gel Apply affected area two times daily as needed using enclosed dosing card. (2-4 grams to chest wall) 100 g 1     EPINEPHrine (EPIPEN 2-CARIDAD) 0.3 MG/0.3ML injection 2-pack Inject 0.3 mLs (0.3 mg) into the muscle once as needed for anaphylaxis (Patient not taking: Reported on 1/6/2020) 0.6 mL 3     fluticasone (FLONASE) 50 MCG/ACT nasal spray Spray 1-2 sprays into both nostrils daily 16 g 0     furosemide (LASIX) 20 MG tablet Take 1 tablet (20 mg) by mouth 2 times daily 90 tablet 3     HYDROcodone-acetaminophen (NORCO)  MG per tablet Take 1 tablet by mouth every 4 (four) hours as needed for pain.  0     HYDROcodone-acetaminophen (NORCO) 5-325 MG tablet Take 1-2 tablets by mouth       ketamine 100 mg/mL (KETALAR) 100 MG/ML intranasal solution 1 spray       KLOR-CON 20 MEQ CR tablet TAKE ONE TABLET BY MOUTH TWICE DAILY 60 tablet 3     levothyroxine (SYNTHROID/LEVOTHROID) 112 MCG tablet MON to SAT 2 tablet/day;SUN 2.5 tablet 180 tablet 4     levothyroxine (SYNTHROID/LEVOTHROID) 112 MCG tablet MON to SAT 2  tablet/day;SUN 2.5 tablet (Patient not taking: Reported on 1/6/2020) 180 tablet 4     lidocaine (XYLOCAINE) 5 % external ointment Apply quarter size amount to chest and back up to 3 times daily as needed for pain. 350 g 1     lidocaine-prilocaine (EMLA) cream Apply topically as needed for moderate pain (Patient not taking: Reported on 1/6/2020) 60 g 1     magic mouthwash (FIRST-MOUTHWASH BLM) suspension Swish and spit 5-10 mLs in mouth every 6 hours as needed for mouth sores (Patient not taking: Reported on 10/11/2019) 237 mL 1     magnesium 200 MG TABS Take 100 mg by mouth 3 times daily        MAGNESIUM GLYCINATE PLUS PO Take 400 mg by mouth 3 times daily       Menaquinone-7 (VITAMIN K2) 100 MCG CAPS Take 1 capsule by mouth daily       methocarbamol (ROBAXIN) 750 MG tablet Take 1 tablet (750 mg) by mouth 4 times daily . Ok to take a 5th tablet on very severe days.  3     montelukast (SINGULAIR) 10 MG tablet Take 2 tablets (20 mg) by mouth 2 times daily 120 tablet 11     naloxone (NARCAN) nasal spray Spray 1 spray (4 mg) into one nostril alternating nostrils as needed for opioid reversal every 2-3 minutes until assistance arrives (Patient not taking: Reported on 1/6/2020) 0.2 mL 0     olopatadine (PATANOL) 0.1 % ophthalmic solution Apply 1 drop to eye       ondansetron (ZOFRAN-ODT) 8 MG ODT tab Take 1 tablet (8 mg) by mouth every 8 hours as needed 30 tablet 0     order for DME Compression socks - knee high 20-30 mmHg zippered if possible 1 Units 11     oxyCODONE IR (ROXICODONE) 10 MG tablet Take 10 mg by mouth every 4 hours as needed       polyethylene glycol (MIRALAX) powder Take 17 g (1 capful) by mouth daily (Patient not taking: Reported on 10/11/2019) 510 g 0     Probiotic Product (PROBIOTIC DAILY PO) Take 1 capsule by mouth daily Lacto acid bifidobacterium       prochlorperazine (COMPAZINE) 10 MG tablet Take 10 mg by mouth as needed  3     ranitidine (ZANTAC) 75 MG tablet Take 1 tablet (75 mg) by mouth 3 times  "daily (Patient not taking: Reported on 1/6/2020) 90 tablet 0     senna-docusate (SENOKOT-S;PERICOLACE) 8.6-50 MG per tablet Take 1-2 tablets by mouth 2 times daily as needed for constipation (Patient not taking: Reported on 1/6/2020) 60 tablet 0     SUMAtriptan (IMITREX) 50 MG tablet Take 1 tablet (50 mg) by mouth as needed for migraine 30 tablet 0     traMADol (ULTRAM) 50 MG tablet Take 1 tablet (50 mg) by mouth every 6 hours as needed for severe pain 30 tablet 0     triamcinolone (KENALOG) 0.025 % ointment Apply topically as needed  3     Triamcinolone Acetonide (AZMACORT IN) Inhale 2 puffs into the lungs as needed       UNABLE TO FIND Take 2 capsules by mouth 3 times daily Muscle Mag. 2 caps contain B1 20mg, B2 20mg, B6 10mg, magesium 20mg, manganese 2mg.       UNABLE TO FIND 3 tablets 3 times daily MEDICATION NAME: calcium D-Glucarate   3 caps contain 180mg of elemental calcium.       UNABLE TO FIND 1 tablet daily MEDICATION NAME: Pure Encapsulations       Physical Examination:  /67 (BP Location: Right arm, Patient Position: Sitting, Cuff Size: Adult Regular)   Pulse 95   Temp 98.3  F (36.8  C) (Oral)   Resp 16   Ht 1.651 m (5' 5\")   Wt 90.7 kg (200 lb)   SpO2 98%   BMI 33.28 kg/m    Wt Readings from Last 10 Encounters:   01/29/20 90.7 kg (200 lb)   01/06/20 90.9 kg (200 lb 6.4 oz)   12/13/19 89.3 kg (196 lb 12.8 oz)   11/22/19 88.5 kg (195 lb 3.2 oz)   11/11/19 86.2 kg (190 lb)   10/11/19 85.4 kg (188 lb 4.8 oz)   08/05/19 83.5 kg (184 lb)   07/01/19 81.6 kg (180 lb)   05/07/19 74.8 kg (164 lb 12.8 oz)   05/03/19 73.5 kg (162 lb)   Constitutional: NAD, no pain during exam today.   Eyes:  PERRL. No scleral icterus.  ENT: Oral mucosa is moist without lesions or thrush.   Cardiovascular: Regular rate and rhythm. Port accessed without surrounding erythema or drainage  Respiratory: Clear to auscultation bilaterally. No wheezes or crackles. No chest wall tenderness to palpation,   Gastrointestinal: Bowel " sounds +. Soft, nontender  Neurologic: normal speech, up to exam table unaided  Skin:No rashes noted.   Extremities: trace lower extremity edema bilaterally, compression stockings in place.    Laboratory Data:  Lab Results   Component Value Date    WBC 5.6 01/06/2020    ANEU 3.8 01/06/2020    HGB 14.8 01/06/2020    HCT 44.0 01/06/2020     01/06/2020     01/29/2020    POTASSIUM 4.1 01/29/2020    CHLORIDE 102 01/29/2020    CO2 34 (H) 01/29/2020     (H) 01/29/2020    BUN 18 01/29/2020    CR 1.25 (H) 01/29/2020    MAG 2.2 12/13/2019    INR 1.00 12/13/2019    AST 23 01/29/2020    ALT 27 01/29/2020     CT 7/1/2019  IMPRESSION: In this patient with history of diffuse large B-cell  lymphoma, papillary thyroid cancer, and breast cancer:  1. No evidence for recurrence of disease.   2. Partially obstructing right ureteropelvic stone measuring 1.0 x 0.4  cm producing mild hydronephrosis. Normal renal perfusion.  3. Moderate to heavy coronary calcification versus stent in the left  main coronary artery. Recommend clinical correlation.    CT 5/21/2018  IMPRESSION: In this patient with a history of multiple malignancies  including diffuse large B-cell lymphoma, breast cancer, and papillary  thyroid cancer:  1. Treatment related changes in the posterior right 10th rib with  slightly increased sclerosis and minimal residual soft tissue  thickening. No evidence of disease progression or new metastatic  disease in the chest, abdomen or pelvis.  2. Slightly increased intra and extra hepatic biliary ductal  dilatation, may be reservoir effect from prior cholecystectomy,  correlate with liver function tests.  3. Bilateral nonobstructing renal calculi, largest measuring 3 mm in  the inferior pole right kidney.     1/29/2019  In this patient with history of diffuse large B-cell lymphoma,  thyroid, and breast cancer, status post thyroidectomy, bilateral  mastectomy:  1.  No evidence active lymphoma in the chest, abdomen  "and pelvis.  2.  Small nonobstructive stone in the lower pole of the right kidney.  Small previously seen left renal stone is no longer present.  3.  Persistent biliary ductal dilatation, unchanged since 2014 and  likely secondary to postcholecystectomy state         Assessment and Plan:    DLBCL, \"double-hit\"-like, with c-myc overexpression but not re-arrangement. s/p   6 cycles of DA-R-EPOCH now in remission for 24 months.      Complication include mucositis,  infusion reaction (rigors), nausea and fatigue.   Her last staging was in 7/2018. Today, overall well, but c/o chronic chest wall pain she had in the past. I recommended to start work-up with CXR. Her labs studies are completely normal.        May 2018 - ongoing CR.  July 2019 - ongoing CR   If chest pain gets worse, I recommend to do CAP CT with IV contract.       ID.  No active infections.  S/p Shignrix vaccine and off acyclovir.     Follow up with Dr. Castro for hypothyroidism.   F/u with  re breast Ca.     History of Rachael en Y gastric bypass  Continue supplements (calcium citrate-vitamin D, vitamin D3, magnesium citrate). Also had iron deficiency anemia likely from poor absorption. S/o iron supplementation. Will monitor Hgb.    Chronic chest wall pain s/p mastectomies  Follows with Dr. Benitez. Palliative care to continue to wean off pain medications.      Plan:   CXR today to evaluate chest pain - if persistent, we may need to proceed to chest CT.  Otherwise f/u with me in 3 months       Garima Sauceda MD  Associate Professor of Medicine  [phone]        ADDENDUM:   I call the patient to review CXR findings, She endorsed ongoing \"excrutiating\" pain in chest/arm and shoulders and scapulas bilaterally. Given a possibility of DLBCL recurrence, I recommended to pursue CT CAP.  Garima Sauceda MD          "

## 2020-02-03 ENCOUNTER — HOSPITAL ENCOUNTER (OUTPATIENT)
Dept: PALLIATIVE MEDICINE | Facility: OTHER | Age: 60
Discharge: HOME | End: 2020-02-03
Attending: ANESTHESIOLOGY

## 2020-02-03 ENCOUNTER — COMMUNICATION - HEALTHEAST (OUTPATIENT)
Dept: PALLIATIVE MEDICINE | Facility: OTHER | Age: 60
End: 2020-02-03

## 2020-02-03 DIAGNOSIS — G89.4 CHRONIC PAIN SYNDROME: ICD-10-CM

## 2020-02-03 DIAGNOSIS — G89.29 OTHER CHRONIC PAIN: ICD-10-CM

## 2020-02-04 ENCOUNTER — AMBULATORY - HEALTHEAST (OUTPATIENT)
Dept: PULMONOLOGY | Facility: OTHER | Age: 60
End: 2020-02-04

## 2020-02-04 ENCOUNTER — TELEPHONE (OUTPATIENT)
Dept: FAMILY MEDICINE | Facility: CLINIC | Age: 60
End: 2020-02-04

## 2020-02-04 ENCOUNTER — COMMUNICATION - HEALTHEAST (OUTPATIENT)
Dept: ADMINISTRATIVE | Facility: CLINIC | Age: 60
End: 2020-02-04

## 2020-02-04 DIAGNOSIS — R06.02 SOB (SHORTNESS OF BREATH): ICD-10-CM

## 2020-02-04 DIAGNOSIS — G89.29 CHRONIC PAIN: Primary | ICD-10-CM

## 2020-02-04 NOTE — TELEPHONE ENCOUNTER
Reason for Call:  Other Pt. Information/instruction    Detailed comments: Jane with [facility] states patient has not followed up [facility] Care Management team.    Jane wants you to follow up with patient if possible.    [phone] - Jane with [facility]    Phone Number Patient can be reached at: Other phone number:  n/a    Best Time: asap    Can we leave a detailed message on this number? YES    Call taken on 2/4/2020 at 9:44 AM by Blaire Augustine

## 2020-02-04 NOTE — TELEPHONE ENCOUNTER
Patients care coordinator with [facility] called to notify provider that patient has not follow up (not returning calls).  Patient was interested in having help finding a job and resources were given.    She would like patient to follow up with clinic to get the help she needs- patient may be more willing to speak with coordinator in clinic.   Should CC referral be placed or should patient follow up with PCP- or both?  Sera Mtz RN

## 2020-02-06 ENCOUNTER — ANCILLARY PROCEDURE (OUTPATIENT)
Dept: CT IMAGING | Facility: CLINIC | Age: 60
End: 2020-02-06
Payer: COMMERCIAL

## 2020-02-06 ENCOUNTER — RECORDS - HEALTHEAST (OUTPATIENT)
Dept: ADMINISTRATIVE | Facility: OTHER | Age: 60
End: 2020-02-06

## 2020-02-06 DIAGNOSIS — C83.38 DIFFUSE LARGE B-CELL LYMPHOMA OF LYMPH NODES OF MULTIPLE REGIONS (H): ICD-10-CM

## 2020-02-06 LAB
CREAT BLD-MCNC: 1.3 MG/DL (ref 0.52–1.04)
GFR SERPL CREATININE-BSD FRML MDRD: 42 ML/MIN/{1.73_M2}

## 2020-02-06 RX ORDER — IOPAMIDOL 755 MG/ML
123 INJECTION, SOLUTION INTRAVASCULAR ONCE
Status: COMPLETED | OUTPATIENT
Start: 2020-02-06 | End: 2020-02-06

## 2020-02-06 RX ADMIN — IOPAMIDOL 123 ML: 755 INJECTION, SOLUTION INTRAVASCULAR at 17:02

## 2020-02-07 ENCOUNTER — PATIENT OUTREACH (OUTPATIENT)
Dept: CARE COORDINATION | Facility: CLINIC | Age: 60
End: 2020-02-07

## 2020-02-07 NOTE — PROGRESS NOTES
Scheduled Follow Up Call: Attempt 1 Community Health Worker called and left a message for the patient. If the patient is returning my call, please transfer the patient to Teresita at [phone].    Referral Reason: assist with finding employment

## 2020-02-10 ENCOUNTER — PATIENT OUTREACH (OUTPATIENT)
Dept: CARE COORDINATION | Facility: CLINIC | Age: 60
End: 2020-02-10

## 2020-02-10 DIAGNOSIS — E87.6 HYPOKALEMIA: ICD-10-CM

## 2020-02-10 RX ORDER — POTASSIUM CHLORIDE 1500 MG/1
TABLET, EXTENDED RELEASE ORAL
Qty: 60 TABLET | Refills: 2 | Status: SHIPPED | OUTPATIENT
Start: 2020-02-10 | End: 2020-05-01

## 2020-02-10 NOTE — PROGRESS NOTES
The clinic Community Health Worker spoke with the patient today at the request of the PCP to discuss possible Clinic Care Coordination enrollment.  The service was described to the patient and immediate needs were discussed.  The patient declined enrollment at this time.  The PCP is encouraged to refer in the future if the patient's needs change.

## 2020-02-19 ENCOUNTER — COMMUNICATION - HEALTHEAST (OUTPATIENT)
Dept: PALLIATIVE MEDICINE | Facility: OTHER | Age: 60
End: 2020-02-19

## 2020-02-19 DIAGNOSIS — G89.29 OTHER CHRONIC PAIN: ICD-10-CM

## 2020-03-02 ENCOUNTER — COMMUNICATION - HEALTHEAST (OUTPATIENT)
Dept: PALLIATIVE MEDICINE | Facility: OTHER | Age: 60
End: 2020-03-02

## 2020-03-02 DIAGNOSIS — G89.29 OTHER CHRONIC PAIN: ICD-10-CM

## 2020-03-02 DIAGNOSIS — D89.42 IDIOPATHIC MAST CELL ACTIVATION SYNDROME (H): Chronic | ICD-10-CM

## 2020-03-03 RX ORDER — MONTELUKAST SODIUM 10 MG/1
20 TABLET ORAL 2 TIMES DAILY
Qty: 120 TABLET | Refills: 10 | Status: SHIPPED | OUTPATIENT
Start: 2020-03-03 | End: 2021-08-11

## 2020-03-04 ENCOUNTER — COMMUNICATION - HEALTHEAST (OUTPATIENT)
Dept: PALLIATIVE MEDICINE | Facility: OTHER | Age: 60
End: 2020-03-04

## 2020-03-04 DIAGNOSIS — G89.29 OTHER CHRONIC PAIN: ICD-10-CM

## 2020-03-06 ENCOUNTER — COMMUNICATION - HEALTHEAST (OUTPATIENT)
Dept: PALLIATIVE MEDICINE | Facility: OTHER | Age: 60
End: 2020-03-06

## 2020-03-06 DIAGNOSIS — G89.29 OTHER CHRONIC PAIN: ICD-10-CM

## 2020-03-09 ENCOUNTER — RECORDS - HEALTHEAST (OUTPATIENT)
Dept: ADMINISTRATIVE | Facility: OTHER | Age: 60
End: 2020-03-09

## 2020-03-09 ENCOUNTER — OFFICE VISIT - HEALTHEAST (OUTPATIENT)
Dept: PULMONOLOGY | Facility: OTHER | Age: 60
End: 2020-03-09

## 2020-03-09 ENCOUNTER — RECORDS - HEALTHEAST (OUTPATIENT)
Dept: PULMONOLOGY | Facility: OTHER | Age: 60
End: 2020-03-09

## 2020-03-09 ENCOUNTER — TRANSFERRED RECORDS (OUTPATIENT)
Dept: HEALTH INFORMATION MANAGEMENT | Facility: CLINIC | Age: 60
End: 2020-03-09

## 2020-03-09 DIAGNOSIS — R93.89 ABNORMAL CT OF THE CHEST: ICD-10-CM

## 2020-03-09 DIAGNOSIS — R06.02 SHORTNESS OF BREATH: ICD-10-CM

## 2020-03-09 LAB — HGB BLD-MCNC: 13 G/DL

## 2020-03-09 ASSESSMENT — MIFFLIN-ST. JEOR: SCORE: 1518.9

## 2020-03-12 ENCOUNTER — COMMUNICATION - HEALTHEAST (OUTPATIENT)
Dept: PALLIATIVE MEDICINE | Facility: OTHER | Age: 60
End: 2020-03-12

## 2020-03-12 DIAGNOSIS — G89.29 OTHER CHRONIC PAIN: ICD-10-CM

## 2020-03-17 ENCOUNTER — COMMUNICATION - HEALTHEAST (OUTPATIENT)
Dept: PALLIATIVE MEDICINE | Facility: OTHER | Age: 60
End: 2020-03-17

## 2020-03-17 DIAGNOSIS — G89.29 OTHER CHRONIC PAIN: ICD-10-CM

## 2020-03-19 ENCOUNTER — COMMUNICATION - HEALTHEAST (OUTPATIENT)
Dept: PULMONOLOGY | Facility: OTHER | Age: 60
End: 2020-03-19

## 2020-03-22 ENCOUNTER — MYC REFILL (OUTPATIENT)
Dept: WOUND CARE | Facility: CLINIC | Age: 60
End: 2020-03-22

## 2020-03-22 DIAGNOSIS — J45.20 MILD INTERMITTENT ASTHMA WITHOUT COMPLICATION: ICD-10-CM

## 2020-03-22 DIAGNOSIS — B96.89 BACTERIAL SINUSITIS: ICD-10-CM

## 2020-03-22 DIAGNOSIS — R60.0 LOWER EXTREMITY EDEMA: ICD-10-CM

## 2020-03-22 DIAGNOSIS — J32.9 BACTERIAL SINUSITIS: ICD-10-CM

## 2020-03-23 DIAGNOSIS — C73 THYROID CANCER (H): ICD-10-CM

## 2020-03-23 DIAGNOSIS — E89.0 POSTSURGICAL HYPOTHYROIDISM: ICD-10-CM

## 2020-03-23 DIAGNOSIS — C73 PAPILLARY CARCINOMA, FOLLICULAR VARIANT (H): ICD-10-CM

## 2020-03-23 DIAGNOSIS — E89.2 POSTSURGICAL HYPOPARATHYROIDISM (H): ICD-10-CM

## 2020-03-23 RX ORDER — ACETAMINOPHEN 160 MG
2000 TABLET,DISINTEGRATING ORAL DAILY
Qty: 90 CAPSULE | Refills: 1 | Status: SHIPPED | OUTPATIENT
Start: 2020-03-23

## 2020-03-23 RX ORDER — ALBUTEROL SULFATE 90 UG/1
1-2 AEROSOL, METERED RESPIRATORY (INHALATION) EVERY 4 HOURS PRN
Qty: 18 G | Refills: 1 | Status: SHIPPED | OUTPATIENT
Start: 2020-03-23 | End: 2021-11-16

## 2020-03-23 RX ORDER — FLUTICASONE PROPIONATE 50 MCG
1-2 SPRAY, SUSPENSION (ML) NASAL DAILY
Qty: 16 G | Refills: 4 | Status: SHIPPED | OUTPATIENT
Start: 2020-03-23 | End: 2020-08-03

## 2020-03-23 NOTE — TELEPHONE ENCOUNTER
Flonase and albuterol prescription approved per [facility] Refill Protocol.    Routing refill request to provider for review/approval because:  DME for compression stockings Drug not on the [facility] refill protocol     Sera Mtz RN

## 2020-03-23 NOTE — TELEPHONE ENCOUNTER
"Prescription approved per [facility] Refill Protocol.    Requested Prescriptions   Pending Prescriptions Disp Refills     Cholecalciferol (VITAMIN D3) 50 MCG (2000 UT) CAPS 90 capsule 1     Sig: Take 2,000 Units by mouth daily       Vitamin Supplements (Adult) Protocol Passed - 3/23/2020 10:29 AM        Passed - High dose Vitamin D not ordered        Passed - Recent (12 mo) or future (30 days) visit within the authorizing provider's specialty     Patient has had an office visit with the authorizing provider or a provider within the authorizing providers department within the previous 12 mos or has a future within next 30 days. See \"Patient Info\" tab in inbasket, or \"Choose Columns\" in Meds & Orders section of the refill encounter.              Passed - Medication is active on med list           "

## 2020-03-26 ENCOUNTER — TELEPHONE (OUTPATIENT)
Dept: FAMILY MEDICINE | Facility: CLINIC | Age: 60
End: 2020-03-26

## 2020-03-26 DIAGNOSIS — R60.0 LOWER EXTREMITY EDEMA: ICD-10-CM

## 2020-03-26 NOTE — TELEPHONE ENCOUNTER
Reason for call:  Other   Patient called regarding (reason for call): FYI    Additional comments: Pharmacy calling states they cannot do compression socks prescription due to not doing medical supplies there. Please call to advise.     Phone number to reach patient:  Other phone number:  [phone]    Best Time:  Any     Can we leave a detailed message on this number?  YES    Travel screening: Not Applicable

## 2020-03-26 NOTE — TELEPHONE ENCOUNTER
Please fax printed DME from 3/26/2020 to [facility] [employer], or patients preferred DME store.   Sera Mtz RN

## 2020-03-31 ENCOUNTER — COMMUNICATION - HEALTHEAST (OUTPATIENT)
Dept: PALLIATIVE MEDICINE | Facility: OTHER | Age: 60
End: 2020-03-31

## 2020-03-31 DIAGNOSIS — G89.29 OTHER CHRONIC PAIN: ICD-10-CM

## 2020-04-02 ENCOUNTER — COMMUNICATION - HEALTHEAST (OUTPATIENT)
Dept: PALLIATIVE MEDICINE | Facility: OTHER | Age: 60
End: 2020-04-02

## 2020-04-02 DIAGNOSIS — G89.29 OTHER CHRONIC PAIN: ICD-10-CM

## 2020-04-02 DIAGNOSIS — G43.519 INTRACTABLE PERSISTENT MIGRAINE AURA WITHOUT CEREBRAL INFARCTION AND WITHOUT STATUS MIGRAINOSUS: ICD-10-CM

## 2020-04-03 DIAGNOSIS — C85.10 HIGH GRADE B-CELL LYMPHOMA (H): ICD-10-CM

## 2020-04-03 DIAGNOSIS — R11.2 NAUSEA AND VOMITING, INTRACTABILITY OF VOMITING NOT SPECIFIED, UNSPECIFIED VOMITING TYPE: ICD-10-CM

## 2020-04-03 RX ORDER — ONDANSETRON 8 MG/1
8 TABLET, ORALLY DISINTEGRATING ORAL EVERY 8 HOURS PRN
Qty: 30 TABLET | Refills: 0 | OUTPATIENT
Start: 2020-04-03

## 2020-04-03 NOTE — TELEPHONE ENCOUNTER
"Routing refill request to provider for review/approval because:  Rx was last prescribed by Edu Benitez    Requested Prescriptions   Pending Prescriptions Disp Refills     SUMAtriptan (IMITREX) 50 MG tablet 30 tablet 0     Sig: Take 1 tablet (50 mg) by mouth as needed for migraine       Serotonin Agonists Failed - 4/2/2020  2:32 PM        Failed - Serotonin Agonist request needs review.     Please review patient's record. If patient has had 8 or more treatments in the past month, please forward to provider.          Passed - Blood pressure under 140/90 in past 12 months     BP Readings from Last 3 Encounters:   01/29/20 121/67   01/06/20 (!) 169/99   12/13/19 (!) 140/80                 Passed - Recent (12 mo) or future (30 days) visit within the authorizing provider's specialty     Patient has had an office visit with the authorizing provider or a provider within the authorizing providers department within the previous 12 mos or has a future within next 30 days. See \"Patient Info\" tab in inbasket, or \"Choose Columns\" in Meds & Orders section of the refill encounter.              Passed - Medication is active on med list        Passed - Patient is age 18 or older        Passed - No active pregnancy on record        Passed - No positive pregnancy test in past 12 months           "

## 2020-04-06 RX ORDER — SUMATRIPTAN 50 MG/1
50 TABLET, FILM COATED ORAL PRN
Qty: 9 TABLET | Refills: 0 | Status: SHIPPED | OUTPATIENT
Start: 2020-04-06 | End: 2020-07-03

## 2020-04-10 ENCOUNTER — HOSPITAL ENCOUNTER (OUTPATIENT)
Dept: PALLIATIVE MEDICINE | Facility: OTHER | Age: 60
Discharge: HOME | End: 2020-04-10
Attending: ANESTHESIOLOGY

## 2020-04-10 ENCOUNTER — TRANSFERRED RECORDS (OUTPATIENT)
Dept: HEALTH INFORMATION MANAGEMENT | Facility: CLINIC | Age: 60
End: 2020-04-10

## 2020-04-10 DIAGNOSIS — G89.29 OTHER CHRONIC PAIN: ICD-10-CM

## 2020-04-17 ENCOUNTER — COMMUNICATION - HEALTHEAST (OUTPATIENT)
Dept: PALLIATIVE MEDICINE | Facility: OTHER | Age: 60
End: 2020-04-17

## 2020-04-17 ENCOUNTER — OFFICE VISIT - HEALTHEAST (OUTPATIENT)
Dept: PALLIATIVE MEDICINE | Facility: OTHER | Age: 60
End: 2020-04-17

## 2020-04-17 DIAGNOSIS — G89.29 OTHER CHRONIC PAIN: ICD-10-CM

## 2020-05-01 ENCOUNTER — COMMUNICATION - HEALTHEAST (OUTPATIENT)
Dept: PALLIATIVE MEDICINE | Facility: OTHER | Age: 60
End: 2020-05-01

## 2020-05-01 DIAGNOSIS — E87.6 HYPOKALEMIA: ICD-10-CM

## 2020-05-01 DIAGNOSIS — G89.29 OTHER CHRONIC PAIN: ICD-10-CM

## 2020-05-01 RX ORDER — POTASSIUM CHLORIDE 1500 MG/1
TABLET, EXTENDED RELEASE ORAL
Qty: 60 TABLET | Refills: 0 | Status: SHIPPED | OUTPATIENT
Start: 2020-05-01 | End: 2020-12-02

## 2020-05-05 ENCOUNTER — COMMUNICATION - HEALTHEAST (OUTPATIENT)
Dept: PALLIATIVE MEDICINE | Facility: OTHER | Age: 60
End: 2020-05-05

## 2020-05-05 ENCOUNTER — PATIENT OUTREACH (OUTPATIENT)
Dept: ONCOLOGY | Facility: CLINIC | Age: 60
End: 2020-05-05

## 2020-05-05 DIAGNOSIS — C83.30 DIFFUSE LARGE B-CELL LYMPHOMA, UNSPECIFIED BODY REGION (H): Primary | ICD-10-CM

## 2020-05-05 DIAGNOSIS — G89.29 OTHER CHRONIC PAIN: ICD-10-CM

## 2020-05-05 DIAGNOSIS — C83.30 DIFFUSE LARGE B-CELL LYMPHOMA, UNSPECIFIED BODY REGION (H): ICD-10-CM

## 2020-05-05 DIAGNOSIS — R07.1 CHEST PAIN ON BREATHING: ICD-10-CM

## 2020-05-05 LAB
ALBUMIN SERPL-MCNC: 3.5 G/DL (ref 3.4–5)
ALP SERPL-CCNC: 90 U/L (ref 40–150)
ALT SERPL W P-5'-P-CCNC: 24 U/L (ref 0–50)
ANION GAP SERPL CALCULATED.3IONS-SCNC: 6 MMOL/L (ref 3–14)
AST SERPL W P-5'-P-CCNC: 21 U/L (ref 0–45)
BASOPHILS # BLD AUTO: 0 10E9/L (ref 0–0.2)
BASOPHILS NFR BLD AUTO: 0.5 %
BILIRUB SERPL-MCNC: 0.5 MG/DL (ref 0.2–1.3)
BUN SERPL-MCNC: 13 MG/DL (ref 7–30)
CALCIUM SERPL-MCNC: 8.3 MG/DL (ref 8.5–10.1)
CHLORIDE SERPL-SCNC: 106 MMOL/L (ref 94–109)
CK SERPL-CCNC: 81 U/L (ref 30–225)
CO2 SERPL-SCNC: 28 MMOL/L (ref 20–32)
CREAT SERPL-MCNC: 0.9 MG/DL (ref 0.52–1.04)
DIFFERENTIAL METHOD BLD: NORMAL
EOSINOPHIL # BLD AUTO: 0.1 10E9/L (ref 0–0.7)
EOSINOPHIL NFR BLD AUTO: 2.5 %
ERYTHROCYTE [DISTWIDTH] IN BLOOD BY AUTOMATED COUNT: 11.9 % (ref 10–15)
GFR SERPL CREATININE-BSD FRML MDRD: 70 ML/MIN/{1.73_M2}
GLUCOSE SERPL-MCNC: 97 MG/DL (ref 70–99)
HCT VFR BLD AUTO: 38.4 % (ref 35–47)
HGB BLD-MCNC: 12.7 G/DL (ref 11.7–15.7)
LDH SERPL L TO P-CCNC: 204 U/L (ref 81–234)
LYMPHOCYTES # BLD AUTO: 1 10E9/L (ref 0.8–5.3)
LYMPHOCYTES NFR BLD AUTO: 23.5 %
MCH RBC QN AUTO: 32.1 PG (ref 26.5–33)
MCHC RBC AUTO-ENTMCNC: 33.1 G/DL (ref 31.5–36.5)
MCV RBC AUTO: 97 FL (ref 78–100)
MONOCYTES # BLD AUTO: 0.5 10E9/L (ref 0–1.3)
MONOCYTES NFR BLD AUTO: 11.9 %
NEUTROPHILS # BLD AUTO: 2.5 10E9/L (ref 1.6–8.3)
NEUTROPHILS NFR BLD AUTO: 61.6 %
PLATELET # BLD AUTO: 197 10E9/L (ref 150–450)
POTASSIUM SERPL-SCNC: 4 MMOL/L (ref 3.4–5.3)
PROT SERPL-MCNC: 7 G/DL (ref 6.8–8.8)
RBC # BLD AUTO: 3.96 10E12/L (ref 3.8–5.2)
SODIUM SERPL-SCNC: 140 MMOL/L (ref 133–144)
WBC # BLD AUTO: 4 10E9/L (ref 4–11)

## 2020-05-05 PROCEDURE — 80053 COMPREHEN METABOLIC PANEL: CPT

## 2020-05-05 PROCEDURE — 85025 COMPLETE CBC W/AUTO DIFF WBC: CPT

## 2020-05-05 PROCEDURE — 82550 ASSAY OF CK (CPK): CPT | Performed by: INTERNAL MEDICINE

## 2020-05-05 PROCEDURE — 36415 COLL VENOUS BLD VENIPUNCTURE: CPT

## 2020-05-05 PROCEDURE — 83615 LACTATE (LD) (LDH) ENZYME: CPT

## 2020-05-05 NOTE — PROGRESS NOTES
Writer rechung HERMAN from [facility] indicating her local lab draw is scheduled for today at 2pm, orders are not in.  Writer placed standing orders earlier today as per request from [facility] scheduler message  Laila Barrett, RN  RN Care Coordinator  [facility] [address] [address]  [phone]

## 2020-05-06 ENCOUNTER — COMMUNICATION - HEALTHEAST (OUTPATIENT)
Dept: PALLIATIVE MEDICINE | Facility: OTHER | Age: 60
End: 2020-05-06

## 2020-05-06 ENCOUNTER — VIRTUAL VISIT (OUTPATIENT)
Dept: TRANSPLANT | Facility: CLINIC | Age: 60
End: 2020-05-06
Payer: COMMERCIAL

## 2020-05-06 DIAGNOSIS — G89.29 OTHER CHRONIC PAIN: ICD-10-CM

## 2020-05-06 DIAGNOSIS — C83.38 DIFFUSE LARGE B-CELL LYMPHOMA OF LYMPH NODES OF MULTIPLE REGIONS (H): Primary | ICD-10-CM

## 2020-05-06 RX ORDER — MORPHINE SULFATE 15 MG/1
15 TABLET ORAL EVERY 4 HOURS PRN
COMMUNITY
End: 2020-11-20

## 2020-05-06 NOTE — PROGRESS NOTES
"Tisha Arias is a 59 year old female who is being evaluated via a billable video visit.      The patient has been notified of following:     \"This video visit will be conducted via a call between you and your physician/provider. We have found that certain health care needs can be provided without the need for an in-person physical exam.  This service lets us provide the care you need with a video conversation.  If a prescription is necessary we can send it directly to your pharmacy.  If lab work is needed we can place an order for that and you can then stop by our lab to have the test done at a later time.    Video visits are billed at different rates depending on your insurance coverage.  Please reach out to your insurance provider with any questions.    If during the course of the call the physician/provider feels a video visit is not appropriate, you will not be charged for this service.\"    Patient has given verbal consent for Video visit? Yes    How would you like to obtain your AVS? Mine    Patient would like the video invitation sent by: Text to cell phone: [phone]    Will anyone else be joining your video visit? No          Secondary Video Option (Doximity), send text message to:  [phone]    I have reviewed and updated the patient's allergies and medication list.    Concerns: Patient has concerns about right upper chest and back pain 7-8/10. Pt was put on morphine recently that has relived some of the pain. She would like to talk about that today.      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 Dx 9/2017 and s/p  DA-R-EPOCH x6 (completed 1/2018). Now in CR.      Oncology:  Cancer n1  - breast cancer in 2011 s/p bilateral mastectomies and BENJIE/BSO  - severe body aches " "secondary to Tamoxifen, Tamoxifen stopped 1/2020  - Hx of papillary thyroid cancer s/p total thyroidectomy  - following Dr. Crawley/Dr. Castro       Cancer n2:    8/2017 - dramatically worse chest and back pain. CT 9/1/17 showed lytic lesion right 10th rib extending to right T9-10 neural foramen with vertebral body invasion. There was associated conglomerate of lymphadenopathy around the mid T-spine. She had a CT guided biopsy showing B-cell high grade lymphoma. CD79a positive and focally weakly CD30 positive, pos for CD20, BCL6, BCL2 and MUM1, and CD10 and CD21 negative. Ki-67 proliferative index is greater than 90-95%. The immunohistochemistry is suggestive of non-GCB immunophenotype of diffuse large B-cell lymphoma. The cytogenetics did not show rearrangement of the BCL2 or c-MYC. There is the presence of BCL6 rearrangement in 78% of cells and gains of MYC and BCL2, but no amplifications.\"  Staging LP and BMBx were negative for lymphoma.   She started DA-REPOCH 10/6/17. She did well with chemo other than rigors during CIVI.  Post cycle 1 complicated by mucositis and worsening of chronic LE peripheral edema.   Cycle 2 on 10/27/17. Due to a rise in her LD and uric acid levels on that admission day, she underwent a CT scan which showed response to therapy with improvement in her mediastinal lymphadenopathy and right chest wall mass.  Cycle 3 on 11/16/2017      Cycle 4 12/7/2017  C5,6 end of January/2018  In CR1     Interval History:  Video visit today. She is with her granddaughter. Overall, Elvin is feeling ok. Lower extremity edema is worse she thinks. She does complains of right sided scapula pain, which comes and goes, working with pain team. No SOB or HEIN, no night sweats, she has a good appetite and no GERD weight loss or N/V/D.   No lumps or abdominal symptoms.     ROS negative.     Recent Labs   Lab Test 05/05/20  1408 01/06/20  1649 12/13/19  1351   WBC 4.0 5.6 5.6   RBC 3.96 4.55 4.49   HGB 12.7 14.8 " "14.5   HCT 38.4 44.0 44.1   MCV 97 97 98   MCH 32.1 32.5 32.3   MCHC 33.1 33.6 32.9   RDW 11.9 11.0 11.5    231 232   NEUTROPHIL 61.6 68.7 73.1   ANEU 2.5 3.8 4.1   ALYM 1.0 1.0 0.9   DARIEL 0.5 0.5 0.5   AEOS 0.1 0.2 0.1     Recent Labs   Lab Test 05/05/20  1408 01/29/20  1551 01/06/20  1649    139 140   POTASSIUM 4.0 4.1 3.8   CHLORIDE 106 102 101   CO2 28 34* 33*   ANIONGAP 6 3 5   GLC 97 120* 105*   BUN 13 18 19   CR 0.90 1.25* 1.06*   ELMO 8.3* 8.7 9.1     Recent Labs   Lab Test 05/05/20  1408 01/29/20  1551 01/06/20  1649   BILITOTAL 0.5 0.3 0.3   ALKPHOS 90 92 105   AST 21 23 27   ALT 24 27 35     Recent Labs   Lab Test 05/05/20  1408 01/29/20  1551 01/06/20  1649    218 247*     Assessment and Plan:     DLBCL, \"double-expressor\", with c-myc overexpression but not re-arrangement. s/p 6 cycles of DA-R-EPOCH now in remission for 24 months.      Complication include mucositis,  infusion reaction (rigors), nausea and fatigue.   Her last staging was in 7/2018. CT CAP on 2/202 for chest pain confirmed CR.     Elvin cont to be bothered by chest wall pain sy and chronic back pain, I re-assured her it is not related to lymphoma.    - Follow up Dr. Sauceda in 6 months with labs done prior      Type of service:  Video Visit    Video Start Time: 4.30PM  Video End Time: 5.00PM    Originating Location (pt. Location): Home    Distant Location (provider location):  [facility]     Platform used for Video Visit: Langtice    Dr. Sauceda presented during entire conversation    Chente Mckeon MD  Hem/Onc fellow    Today, I  saw and examined the patient independently in clinic and discussed the recommendations. I reviewed the case with the fellow and agree with the note as above. Elvin is re-assured, clinically in CR at 2 years, this is great! Cont observation.     Garima Sauceda MD           "

## 2020-05-12 ENCOUNTER — COMMUNICATION - HEALTHEAST (OUTPATIENT)
Dept: PALLIATIVE MEDICINE | Facility: OTHER | Age: 60
End: 2020-05-12

## 2020-05-12 DIAGNOSIS — G89.29 OTHER CHRONIC PAIN: ICD-10-CM

## 2020-05-15 ENCOUNTER — COMMUNICATION - HEALTHEAST (OUTPATIENT)
Dept: PALLIATIVE MEDICINE | Facility: OTHER | Age: 60
End: 2020-05-15

## 2020-05-15 ENCOUNTER — MYC MEDICAL ADVICE (OUTPATIENT)
Dept: FAMILY MEDICINE | Facility: CLINIC | Age: 60
End: 2020-05-15

## 2020-05-15 DIAGNOSIS — R60.0 LOWER EXTREMITY EDEMA: ICD-10-CM

## 2020-05-15 DIAGNOSIS — G89.29 OTHER CHRONIC PAIN: ICD-10-CM

## 2020-05-15 DIAGNOSIS — Z11.1 SCREENING EXAMINATION FOR PULMONARY TUBERCULOSIS: Primary | ICD-10-CM

## 2020-05-15 NOTE — TELEPHONE ENCOUNTER
Patient was not notified of previous DME order, will need new copy- order pending.  She is also requesting TB blood test for work (unsure of correct order to pend).   Sera Mtz RN

## 2020-05-18 NOTE — TELEPHONE ENCOUNTER
Patient can scheduled a lab appointment for her TB Quantiferon test   - DME for compression socks was local printed    Lucia Stein RN

## 2020-05-21 NOTE — TELEPHONE ENCOUNTER
Patient has read her [employer] message . I called to see what she would like us to do with DME order.  Dionne Rich,

## 2020-05-22 DIAGNOSIS — Z11.1 SCREENING EXAMINATION FOR PULMONARY TUBERCULOSIS: ICD-10-CM

## 2020-05-22 PROCEDURE — 36415 COLL VENOUS BLD VENIPUNCTURE: CPT | Performed by: FAMILY MEDICINE

## 2020-05-22 PROCEDURE — 86481 TB AG RESPONSE T-CELL SUSP: CPT | Performed by: FAMILY MEDICINE

## 2020-05-26 ENCOUNTER — TRANSFERRED RECORDS (OUTPATIENT)
Dept: HEALTH INFORMATION MANAGEMENT | Facility: CLINIC | Age: 60
End: 2020-05-26

## 2020-05-26 ENCOUNTER — HOSPITAL ENCOUNTER (OUTPATIENT)
Dept: PALLIATIVE MEDICINE | Facility: OTHER | Age: 60
Discharge: HOME | End: 2020-05-26
Attending: ANESTHESIOLOGY

## 2020-05-26 DIAGNOSIS — G89.29 OTHER CHRONIC PAIN: ICD-10-CM

## 2020-05-26 LAB
GAMMA INTERFERON BACKGROUND BLD IA-ACNC: 0.04 IU/ML
M TB IFN-G BLD-IMP: NEGATIVE
M TB IFN-G CD4+ BCKGRND COR BLD-ACNC: 8.29 IU/ML
MITOGEN IGNF BCKGRD COR BLD-ACNC: 0 IU/ML
MITOGEN IGNF BCKGRD COR BLD-ACNC: 0 IU/ML

## 2020-05-26 ASSESSMENT — MIFFLIN-ST. JEOR: SCORE: 1523.43

## 2020-05-26 NOTE — TELEPHONE ENCOUNTER
Closing encounter as patient had no response. Put order in BlueBook in case patient may call or MyChart.  Dionne Powell  Team Layla,

## 2020-05-28 ENCOUNTER — COMMUNICATION - HEALTHEAST (OUTPATIENT)
Dept: PALLIATIVE MEDICINE | Facility: OTHER | Age: 60
End: 2020-05-28

## 2020-06-02 ENCOUNTER — COMMUNICATION - HEALTHEAST (OUTPATIENT)
Dept: PALLIATIVE MEDICINE | Facility: OTHER | Age: 60
End: 2020-06-02

## 2020-06-02 DIAGNOSIS — G89.29 OTHER CHRONIC PAIN: ICD-10-CM

## 2020-06-11 ENCOUNTER — COMMUNICATION - HEALTHEAST (OUTPATIENT)
Dept: PALLIATIVE MEDICINE | Facility: OTHER | Age: 60
End: 2020-06-11

## 2020-06-19 ENCOUNTER — COMMUNICATION - HEALTHEAST (OUTPATIENT)
Dept: PALLIATIVE MEDICINE | Facility: OTHER | Age: 60
End: 2020-06-19

## 2020-06-19 DIAGNOSIS — G89.29 OTHER CHRONIC PAIN: ICD-10-CM

## 2020-06-30 ENCOUNTER — COMMUNICATION - HEALTHEAST (OUTPATIENT)
Dept: PALLIATIVE MEDICINE | Facility: OTHER | Age: 60
End: 2020-06-30

## 2020-06-30 DIAGNOSIS — G89.29 OTHER CHRONIC PAIN: ICD-10-CM

## 2020-07-01 ENCOUNTER — COMMUNICATION - HEALTHEAST (OUTPATIENT)
Dept: PALLIATIVE MEDICINE | Facility: OTHER | Age: 60
End: 2020-07-01

## 2020-07-02 DIAGNOSIS — R11.2 NAUSEA AND VOMITING, INTRACTABILITY OF VOMITING NOT SPECIFIED, UNSPECIFIED VOMITING TYPE: ICD-10-CM

## 2020-07-02 DIAGNOSIS — G43.519 INTRACTABLE PERSISTENT MIGRAINE AURA WITHOUT CEREBRAL INFARCTION AND WITHOUT STATUS MIGRAINOSUS: ICD-10-CM

## 2020-07-02 DIAGNOSIS — C85.10 HIGH GRADE B-CELL LYMPHOMA (H): ICD-10-CM

## 2020-07-02 RX ORDER — ONDANSETRON 8 MG/1
TABLET, ORALLY DISINTEGRATING ORAL
Qty: 30 TABLET | Refills: 0 | OUTPATIENT
Start: 2020-07-02

## 2020-07-03 RX ORDER — SUMATRIPTAN 50 MG/1
50 TABLET, FILM COATED ORAL PRN
Qty: 9 TABLET | Refills: 0 | Status: SHIPPED | OUTPATIENT
Start: 2020-07-03 | End: 2020-10-01

## 2020-07-03 NOTE — TELEPHONE ENCOUNTER
Prescription approved per [facility].  Ginette Thomas RN

## 2020-07-08 ENCOUNTER — COMMUNICATION - HEALTHEAST (OUTPATIENT)
Dept: PALLIATIVE MEDICINE | Facility: OTHER | Age: 60
End: 2020-07-08

## 2020-07-08 DIAGNOSIS — G89.29 OTHER CHRONIC PAIN: ICD-10-CM

## 2020-07-09 ENCOUNTER — TRANSFERRED RECORDS (OUTPATIENT)
Dept: HEALTH INFORMATION MANAGEMENT | Facility: CLINIC | Age: 60
End: 2020-07-09

## 2020-07-09 ENCOUNTER — OFFICE VISIT - HEALTHEAST (OUTPATIENT)
Dept: PULMONOLOGY | Facility: OTHER | Age: 60
End: 2020-07-09

## 2020-07-09 DIAGNOSIS — R91.8 PULMONARY NODULES: ICD-10-CM

## 2020-07-09 ASSESSMENT — MIFFLIN-ST. JEOR: SCORE: 1478.07

## 2020-07-17 ENCOUNTER — OFFICE VISIT - HEALTHEAST (OUTPATIENT)
Dept: PALLIATIVE MEDICINE | Facility: OTHER | Age: 60
End: 2020-07-17

## 2020-07-17 DIAGNOSIS — G89.29 OTHER CHRONIC PAIN: ICD-10-CM

## 2020-07-27 ENCOUNTER — HOSPITAL ENCOUNTER (OUTPATIENT)
Dept: PALLIATIVE MEDICINE | Facility: OTHER | Age: 60
Discharge: HOME | End: 2020-07-27
Attending: ANESTHESIOLOGY

## 2020-07-27 ENCOUNTER — TRANSFERRED RECORDS (OUTPATIENT)
Dept: HEALTH INFORMATION MANAGEMENT | Facility: CLINIC | Age: 60
End: 2020-07-27

## 2020-07-27 DIAGNOSIS — G89.4 CHRONIC PAIN DISORDER: ICD-10-CM

## 2020-07-27 DIAGNOSIS — G89.29 OTHER CHRONIC PAIN: ICD-10-CM

## 2020-08-03 ENCOUNTER — COMMUNICATION - HEALTHEAST (OUTPATIENT)
Dept: PULMONOLOGY | Facility: OTHER | Age: 60
End: 2020-08-03

## 2020-08-03 ENCOUNTER — MYC REFILL (OUTPATIENT)
Dept: WOUND CARE | Facility: CLINIC | Age: 60
End: 2020-08-03

## 2020-08-03 ENCOUNTER — MYC REFILL (OUTPATIENT)
Dept: ONCOLOGY | Facility: CLINIC | Age: 60
End: 2020-08-03

## 2020-08-03 ENCOUNTER — MYC REFILL (OUTPATIENT)
Dept: ENDOCRINOLOGY | Facility: CLINIC | Age: 60
End: 2020-08-03

## 2020-08-03 DIAGNOSIS — C73 PAPILLARY CARCINOMA, FOLLICULAR VARIANT (H): ICD-10-CM

## 2020-08-03 DIAGNOSIS — B96.89 BACTERIAL SINUSITIS: ICD-10-CM

## 2020-08-03 DIAGNOSIS — R07.89 CHEST TIGHTNESS: ICD-10-CM

## 2020-08-03 DIAGNOSIS — E89.0 POSTSURGICAL HYPOTHYROIDISM: ICD-10-CM

## 2020-08-03 DIAGNOSIS — J32.9 BACTERIAL SINUSITIS: ICD-10-CM

## 2020-08-03 DIAGNOSIS — D89.42 IDIOPATHIC MAST CELL ACTIVATION SYNDROME (H): Chronic | ICD-10-CM

## 2020-08-03 DIAGNOSIS — C77.0 METASTASIS TO CERVICAL LYMPH NODE (H): ICD-10-CM

## 2020-08-03 RX ORDER — MONTELUKAST SODIUM 10 MG/1
20 TABLET ORAL 2 TIMES DAILY
Qty: 120 TABLET | Refills: 10 | Status: CANCELLED | OUTPATIENT
Start: 2020-08-03

## 2020-08-03 RX ORDER — CALCITRIOL 0.25 UG/1
CAPSULE, LIQUID FILLED ORAL
Qty: 270 CAPSULE | Refills: 3 | Status: SHIPPED | OUTPATIENT
Start: 2020-08-03 | End: 2021-09-02

## 2020-08-03 NOTE — TELEPHONE ENCOUNTER
"Sent to provider for review.    Darby Jordan RN on 8/3/2020 at 10:10 AM         Last clinic visit 11/15/2019 notes: \"4.   Hypocalcemia with normal PTH. She has low albumin.   I think the problem is more one of calcium malabsorption (related to past Rachael en Y) than hypoparathyroidisim, though perhaps she also has subclinical hypoparathyroidism which makes it harder to compensate.   We have been unable to get her off calcitriol (0.75 mcg/day) and calcium in the past.    Recent labs are acceptable.    Low threshold to repeat 24 hour urine calcium.\"  "

## 2020-08-04 RX ORDER — FLUTICASONE PROPIONATE 50 MCG
1-2 SPRAY, SUSPENSION (ML) NASAL DAILY
Qty: 16 G | Refills: 2 | Status: SHIPPED | OUTPATIENT
Start: 2020-08-04 | End: 2024-08-14

## 2020-08-10 ENCOUNTER — COMMUNICATION - HEALTHEAST (OUTPATIENT)
Dept: PALLIATIVE MEDICINE | Facility: OTHER | Age: 60
End: 2020-08-10

## 2020-08-10 DIAGNOSIS — G89.29 OTHER CHRONIC PAIN: ICD-10-CM

## 2020-08-26 ENCOUNTER — COMMUNICATION - HEALTHEAST (OUTPATIENT)
Dept: PALLIATIVE MEDICINE | Facility: OTHER | Age: 60
End: 2020-08-26

## 2020-08-26 DIAGNOSIS — G89.29 OTHER CHRONIC PAIN: ICD-10-CM

## 2020-08-28 ENCOUNTER — COMMUNICATION - HEALTHEAST (OUTPATIENT)
Dept: PALLIATIVE MEDICINE | Facility: OTHER | Age: 60
End: 2020-08-28

## 2020-08-28 DIAGNOSIS — G89.29 OTHER CHRONIC PAIN: ICD-10-CM

## 2020-09-08 ENCOUNTER — COMMUNICATION - HEALTHEAST (OUTPATIENT)
Dept: PALLIATIVE MEDICINE | Facility: OTHER | Age: 60
End: 2020-09-08

## 2020-09-08 ENCOUNTER — HOSPITAL ENCOUNTER (OUTPATIENT)
Dept: PALLIATIVE MEDICINE | Facility: OTHER | Age: 60
Discharge: HOME | End: 2020-09-08
Attending: ANESTHESIOLOGY

## 2020-09-08 DIAGNOSIS — G89.29 OTHER CHRONIC PAIN: ICD-10-CM

## 2020-09-08 DIAGNOSIS — G89.4 CHRONIC PAIN SYNDROME: ICD-10-CM

## 2020-09-11 ENCOUNTER — COMMUNICATION - HEALTHEAST (OUTPATIENT)
Dept: PALLIATIVE MEDICINE | Facility: OTHER | Age: 60
End: 2020-09-11

## 2020-09-14 ENCOUNTER — COMMUNICATION - HEALTHEAST (OUTPATIENT)
Dept: PALLIATIVE MEDICINE | Facility: OTHER | Age: 60
End: 2020-09-14

## 2020-09-14 DIAGNOSIS — G89.29 OTHER CHRONIC PAIN: ICD-10-CM

## 2020-09-22 ENCOUNTER — COMMUNICATION - HEALTHEAST (OUTPATIENT)
Dept: PALLIATIVE MEDICINE | Facility: OTHER | Age: 60
End: 2020-09-22

## 2020-09-22 DIAGNOSIS — G89.29 OTHER CHRONIC PAIN: ICD-10-CM

## 2020-10-01 ENCOUNTER — COMMUNICATION - HEALTHEAST (OUTPATIENT)
Dept: PALLIATIVE MEDICINE | Facility: OTHER | Age: 60
End: 2020-10-01

## 2020-10-01 ENCOUNTER — MYC REFILL (OUTPATIENT)
Dept: FAMILY MEDICINE | Facility: CLINIC | Age: 60
End: 2020-10-01

## 2020-10-01 DIAGNOSIS — G43.519 INTRACTABLE PERSISTENT MIGRAINE AURA WITHOUT CEREBRAL INFARCTION AND WITHOUT STATUS MIGRAINOSUS: ICD-10-CM

## 2020-10-01 DIAGNOSIS — G89.29 OTHER CHRONIC PAIN: ICD-10-CM

## 2020-10-03 RX ORDER — SUMATRIPTAN 50 MG/1
50 TABLET, FILM COATED ORAL PRN
Qty: 9 TABLET | Refills: 0 | Status: SHIPPED | OUTPATIENT
Start: 2020-10-03 | End: 2020-10-30

## 2020-10-04 ENCOUNTER — TRANSFERRED RECORDS (OUTPATIENT)
Dept: HEALTH INFORMATION MANAGEMENT | Facility: CLINIC | Age: 60
End: 2020-10-04

## 2020-10-06 DIAGNOSIS — C85.10 HIGH GRADE B-CELL LYMPHOMA (H): ICD-10-CM

## 2020-10-06 DIAGNOSIS — R07.89 CHEST WALL PAIN: ICD-10-CM

## 2020-10-06 DIAGNOSIS — R11.2 NAUSEA AND VOMITING, INTRACTABILITY OF VOMITING NOT SPECIFIED, UNSPECIFIED VOMITING TYPE: ICD-10-CM

## 2020-10-07 RX ORDER — ONDANSETRON 8 MG/1
8 TABLET, ORALLY DISINTEGRATING ORAL EVERY 8 HOURS PRN
Qty: 30 TABLET | Refills: 3 | Status: SHIPPED | OUTPATIENT
Start: 2020-10-07 | End: 2021-04-20

## 2020-10-07 RX ORDER — CELECOXIB 200 MG/1
200 CAPSULE ORAL 2 TIMES DAILY
Qty: 60 CAPSULE | Refills: 2 | Status: SHIPPED | OUTPATIENT
Start: 2020-10-07 | End: 2021-01-22

## 2020-10-07 NOTE — TELEPHONE ENCOUNTER
Previously prescribed by palliative care.  Pt no longer seeing palliative care.  Can you take over prescribing?  Charlee PERKINSN, RN

## 2020-10-09 ENCOUNTER — HOSPITAL ENCOUNTER (OUTPATIENT)
Dept: CT IMAGING | Facility: HOSPITAL | Age: 60
Discharge: HOME | End: 2020-10-09
Attending: INTERNAL MEDICINE

## 2020-10-09 DIAGNOSIS — R91.8 PULMONARY NODULES: ICD-10-CM

## 2020-10-12 ENCOUNTER — COMMUNICATION - HEALTHEAST (OUTPATIENT)
Dept: PALLIATIVE MEDICINE | Facility: OTHER | Age: 60
End: 2020-10-12

## 2020-10-12 DIAGNOSIS — G89.29 OTHER CHRONIC PAIN: ICD-10-CM

## 2020-10-19 ENCOUNTER — COMMUNICATION - HEALTHEAST (OUTPATIENT)
Dept: PALLIATIVE MEDICINE | Facility: OTHER | Age: 60
End: 2020-10-19

## 2020-10-19 DIAGNOSIS — G89.29 OTHER CHRONIC PAIN: ICD-10-CM

## 2020-10-26 ENCOUNTER — COMMUNICATION - HEALTHEAST (OUTPATIENT)
Dept: PALLIATIVE MEDICINE | Facility: OTHER | Age: 60
End: 2020-10-26

## 2020-10-26 DIAGNOSIS — G89.29 OTHER CHRONIC PAIN: ICD-10-CM

## 2020-10-29 NOTE — TELEPHONE ENCOUNTER
"Received VM from patient reporting that her \"7 medications\" all have the side effect of pain. She states she hasn't called to notify us about that as she \"knew the answer.\" She reports that she cannot walk upstairs anymore and that her pain is unbearable. She requests refills of ms contin and oxycodone.     Last refill: 4/21/2018  Last office visit: 2/28/2018  Scheduled for follow up 6/6/2018     Will route request to MD for review.     Reviewed [place]  Report.    " Pt daughter in law called wanting to know if labs will still be sent to [facility]. They have not gotten the orders and only do it on Tuesdays and thhe procedure is scheduled for Friday. Person at the hospital mentioned having them done at the hospital so she was a bit confused since the [facility] has not received the order. Please follow up. Okay to LVM if no answer.

## 2020-10-30 ENCOUNTER — COMMUNICATION - HEALTHEAST (OUTPATIENT)
Dept: PALLIATIVE MEDICINE | Facility: OTHER | Age: 60
End: 2020-10-30

## 2020-10-30 DIAGNOSIS — G43.519 INTRACTABLE PERSISTENT MIGRAINE AURA WITHOUT CEREBRAL INFARCTION AND WITHOUT STATUS MIGRAINOSUS: ICD-10-CM

## 2020-10-30 RX ORDER — SUMATRIPTAN 50 MG/1
TABLET, FILM COATED ORAL
Qty: 9 TABLET | Refills: 0 | Status: SHIPPED | OUTPATIENT
Start: 2020-10-30 | End: 2021-01-07

## 2020-10-30 NOTE — TELEPHONE ENCOUNTER
TC, patient due for:  Physical    Health Maintenance Due   Topic Date Due     ADVANCE CARE PLANNING  1960     PREVENTIVE CARE VISIT  08/21/2016     Pneumococcal Vaccine: Pediatrics (0 to 5 Years) and At-Risk Patients (6 to 64 Years) (3 of 3 - PCV13) 04/15/2017     DTAP/TDAP/TD IMMUNIZATION (4 - Td) 12/15/2017     URINE DRUG SCREEN  08/30/2019     PHQ-2  01/01/2020     ASTHMA ACTION PLAN  01/07/2020     ASTHMA CONTROL TEST  05/22/2020     INFLUENZA VACCINE (1) 09/01/2020     Meliza Anderson BSN, RN

## 2020-11-02 RX ORDER — SUMATRIPTAN 50 MG/1
50 TABLET, FILM COATED ORAL
Qty: 9 TABLET | Refills: 1 | OUTPATIENT
Start: 2020-11-02

## 2020-11-03 ENCOUNTER — HOSPITAL ENCOUNTER (OUTPATIENT)
Dept: PALLIATIVE MEDICINE | Facility: OTHER | Age: 60
Discharge: HOME | End: 2020-11-03
Attending: ANESTHESIOLOGY

## 2020-11-03 ENCOUNTER — COMMUNICATION - HEALTHEAST (OUTPATIENT)
Dept: PALLIATIVE MEDICINE | Facility: OTHER | Age: 60
End: 2020-11-03

## 2020-11-03 ENCOUNTER — TRANSFERRED RECORDS (OUTPATIENT)
Dept: HEALTH INFORMATION MANAGEMENT | Facility: CLINIC | Age: 60
End: 2020-11-03

## 2020-11-03 DIAGNOSIS — M79.2 NEUROPATHIC PAIN: ICD-10-CM

## 2020-11-03 DIAGNOSIS — G89.29 OTHER CHRONIC PAIN: ICD-10-CM

## 2020-11-04 ENCOUNTER — COMMUNICATION - HEALTHEAST (OUTPATIENT)
Dept: PALLIATIVE MEDICINE | Facility: OTHER | Age: 60
End: 2020-11-04

## 2020-11-11 ENCOUNTER — APPOINTMENT (OUTPATIENT)
Dept: LAB | Facility: CLINIC | Age: 60
End: 2020-11-11
Payer: COMMERCIAL

## 2020-11-11 ENCOUNTER — OFFICE VISIT (OUTPATIENT)
Dept: TRANSPLANT | Facility: CLINIC | Age: 60
End: 2020-11-11
Payer: COMMERCIAL

## 2020-11-11 VITALS
OXYGEN SATURATION: 99 % | DIASTOLIC BLOOD PRESSURE: 84 MMHG | HEART RATE: 87 BPM | HEIGHT: 65 IN | BODY MASS INDEX: 37.97 KG/M2 | WEIGHT: 227.9 LBS | TEMPERATURE: 98.3 F | SYSTOLIC BLOOD PRESSURE: 159 MMHG

## 2020-11-11 DIAGNOSIS — C83.38 DIFFUSE LARGE B-CELL LYMPHOMA OF LYMPH NODES OF MULTIPLE REGIONS (H): ICD-10-CM

## 2020-11-11 DIAGNOSIS — R10.13 ABDOMINAL PAIN, EPIGASTRIC: ICD-10-CM

## 2020-11-11 DIAGNOSIS — R07.1 CHEST PAIN ON BREATHING: ICD-10-CM

## 2020-11-11 DIAGNOSIS — E89.0 POSTSURGICAL HYPOTHYROIDISM: Primary | ICD-10-CM

## 2020-11-11 DIAGNOSIS — E20.9 HYPOPARATHYROIDISM, UNSPECIFIED HYPOPARATHYROIDISM TYPE (H): ICD-10-CM

## 2020-11-11 DIAGNOSIS — G62.0 DRUG-INDUCED POLYNEUROPATHY (H): ICD-10-CM

## 2020-11-11 DIAGNOSIS — F11.93 OPIATE WITHDRAWAL (H): ICD-10-CM

## 2020-11-11 LAB
ALBUMIN SERPL-MCNC: 3.4 G/DL (ref 3.4–5)
ALP SERPL-CCNC: 100 U/L (ref 40–150)
ALT SERPL W P-5'-P-CCNC: 42 U/L (ref 0–50)
ANION GAP SERPL CALCULATED.3IONS-SCNC: 4 MMOL/L (ref 3–14)
AST SERPL W P-5'-P-CCNC: 37 U/L (ref 0–45)
BASOPHILS # BLD AUTO: 0 10E9/L (ref 0–0.2)
BASOPHILS NFR BLD AUTO: 0.5 %
BILIRUB SERPL-MCNC: 0.3 MG/DL (ref 0.2–1.3)
BUN SERPL-MCNC: 16 MG/DL (ref 7–30)
CALCIUM SERPL-MCNC: 8.2 MG/DL (ref 8.5–10.1)
CHLORIDE SERPL-SCNC: 106 MMOL/L (ref 94–109)
CK SERPL-CCNC: 107 U/L (ref 30–225)
CO2 SERPL-SCNC: 31 MMOL/L (ref 20–32)
CREAT SERPL-MCNC: 0.94 MG/DL (ref 0.52–1.04)
DIFFERENTIAL METHOD BLD: NORMAL
EOSINOPHIL # BLD AUTO: 0.1 10E9/L (ref 0–0.7)
EOSINOPHIL NFR BLD AUTO: 2.6 %
ERYTHROCYTE [DISTWIDTH] IN BLOOD BY AUTOMATED COUNT: 11.9 % (ref 10–15)
GFR SERPL CREATININE-BSD FRML MDRD: 65 ML/MIN/{1.73_M2}
GLUCOSE SERPL-MCNC: 117 MG/DL (ref 70–99)
HCT VFR BLD AUTO: 38.3 % (ref 35–47)
HGB BLD-MCNC: 12.5 G/DL (ref 11.7–15.7)
IMM GRANULOCYTES # BLD: 0 10E9/L (ref 0–0.4)
IMM GRANULOCYTES NFR BLD: 0.2 %
LDH SERPL L TO P-CCNC: 233 U/L (ref 81–234)
LYMPHOCYTES # BLD AUTO: 1 10E9/L (ref 0.8–5.3)
LYMPHOCYTES NFR BLD AUTO: 22.5 %
MCH RBC QN AUTO: 30.1 PG (ref 26.5–33)
MCHC RBC AUTO-ENTMCNC: 32.6 G/DL (ref 31.5–36.5)
MCV RBC AUTO: 92 FL (ref 78–100)
MONOCYTES # BLD AUTO: 0.4 10E9/L (ref 0–1.3)
MONOCYTES NFR BLD AUTO: 8.2 %
NEUTROPHILS # BLD AUTO: 2.8 10E9/L (ref 1.6–8.3)
NEUTROPHILS NFR BLD AUTO: 66 %
NRBC # BLD AUTO: 0 10*3/UL
NRBC BLD AUTO-RTO: 0 /100
PLATELET # BLD AUTO: 182 10E9/L (ref 150–450)
POTASSIUM SERPL-SCNC: 4.2 MMOL/L (ref 3.4–5.3)
PROT SERPL-MCNC: 7.4 G/DL (ref 6.8–8.8)
RBC # BLD AUTO: 4.15 10E12/L (ref 3.8–5.2)
SODIUM SERPL-SCNC: 140 MMOL/L (ref 133–144)
T4 FREE SERPL-MCNC: 1.23 NG/DL (ref 0.76–1.46)
TSH SERPL DL<=0.005 MIU/L-ACNC: 0.06 MU/L (ref 0.4–4)
WBC # BLD AUTO: 4.3 10E9/L (ref 4–11)

## 2020-11-11 PROCEDURE — 84439 ASSAY OF FREE THYROXINE: CPT | Performed by: INTERNAL MEDICINE

## 2020-11-11 PROCEDURE — 36415 COLL VENOUS BLD VENIPUNCTURE: CPT

## 2020-11-11 PROCEDURE — 84439 ASSAY OF FREE THYROXINE: CPT

## 2020-11-11 PROCEDURE — G0463 HOSPITAL OUTPT CLINIC VISIT: HCPCS

## 2020-11-11 PROCEDURE — 83615 LACTATE (LD) (LDH) ENZYME: CPT | Performed by: INTERNAL MEDICINE

## 2020-11-11 PROCEDURE — 85025 COMPLETE CBC W/AUTO DIFF WBC: CPT | Performed by: INTERNAL MEDICINE

## 2020-11-11 PROCEDURE — 82550 ASSAY OF CK (CPK): CPT | Performed by: INTERNAL MEDICINE

## 2020-11-11 PROCEDURE — 80053 COMPREHEN METABOLIC PANEL: CPT | Performed by: INTERNAL MEDICINE

## 2020-11-11 PROCEDURE — 99214 OFFICE O/P EST MOD 30 MIN: CPT | Mod: GC

## 2020-11-11 PROCEDURE — 84443 ASSAY THYROID STIM HORMONE: CPT | Performed by: INTERNAL MEDICINE

## 2020-11-11 ASSESSMENT — PAIN SCALES - GENERAL: PAINLEVEL: EXTREME PAIN (8)

## 2020-11-11 ASSESSMENT — MIFFLIN-ST. JEOR: SCORE: 1604.63

## 2020-11-11 NOTE — NURSING NOTE
"Oncology Rooming Note    November 11, 2020 4:30 PM   Tisha Arias is a 60 year old female who presents for:    Chief Complaint   Patient presents with     Blood Draw     Venipuncture labs collected by RN.      Oncology Clinic Visit     Return: DLBCL (diffuse large B cell lymphoma)     Initial Vitals: BP (!) 159/84 (BP Location: Left arm, Patient Position: Sitting)   Pulse 87   Temp 98.3  F (36.8  C) (Oral)   Ht 1.651 m (5' 5\")   Wt 103.4 kg (227 lb 14.4 oz)   SpO2 99%   BMI 37.92 kg/m   Estimated body mass index is 37.92 kg/m  as calculated from the following:    Height as of this encounter: 1.651 m (5' 5\").    Weight as of this encounter: 103.4 kg (227 lb 14.4 oz). Body surface area is 2.18 meters squared.  Extreme Pain (8) Comment: Data Unavailable   No LMP recorded. Patient has had a hysterectomy.  Allergies reviewed: Yes  Medications reviewed: Yes    Medications: Medication refills not needed today.  Pharmacy name entered into [employer]: [id number] - [place], [place] - [address] [address] [place]    Clinical concerns: N/A       Marlena Jimenez CMA              "

## 2020-11-11 NOTE — LETTER
"    11/11/2020         RE: Tisha Arias  [address] [place] [address]        Dear Colleague,    Thank you for referring your patient, Tisha Arias, to the [facility] [place]. Please see a copy of my visit note below.     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 Dx 9/2017 and s/p DA-R-EPOCH x6 (completed 1/2018). Now in CR1.      Hx [id number] ER/KY+ HER2- right breast cancer:  - Breast cancer in 2011 s/p bilateral mastectomies and BENJIE/BSO  - Severe body aches secondary to Tamoxifen, Tamoxifen stopped 1/2020  - Hx of papillary thyroid cancer s/p total thyroidectomy  - Followed by Dr. Crawley/Dr. Castro       DLBCL:  - 8/2017 developed dramatically worse chest and back pain. CT 9/1/17 showed lytic lesion right 10th rib extending to right T9-10 neural foramen with vertebral body invasion. There was associated conglomerate of lymphadenopathy around the mid T-spine. She had a CT guided biopsy showing B-cell high grade lymphoma. CD79a positive and focally weakly CD30 positive, pos for CD20, BCL6, BCL2 and MUM1, and CD10 and CD21 negative. Ki-67 proliferative index is greater than 90-95%. The immunohistochemistry is suggestive of non-GCB immunophenotype of diffuse large B-cell lymphoma. The cytogenetics did not show rearrangement of the BCL2 or c-MYC. There is the presence of BCL6 rearrangement in 78% of cells and gains of MYC and BCL2, but no amplifications.\"  Staging LP and BMBx were negative for lymphoma.   - She started DA-REPOCH 10/6/17. She did well with chemo other than rigors during CIVI. Post cycle 1 complicated by mucositis and worsening of chronic LE peripheral edema.   Completed 6 cycles by January/2018 with " "EOT PET-CT showing CR.  - 2/6/20 CT CAP shows ongoing CR.     Interval History:  Elvin has not been feeling well over the last year. Main symptoms are worsening abdominal pain/cramps and increased lower extremity edema over the last 6 months. The abdominal cramps are mostly in upper abdomen, last an hour or more, and are so severe that \"they turn her skin black and blue.\" Does not seem to be related to bowel movements, eating, or activity. She does get nausea with eating however. Otherwise bowels are regular with no blood noted. She also has more shortness of breath with activity and knots in her upper chest. She has gained 30 lb in the last year. Lower extremity edema has also worsened, she is using compression stockings but not sure if helping.     She previously followed with Palliative Medicine here regarding chronic pain issues, but now follows with Dr. Berry at [facility]. She is on PRN Dilaudid, ketamine lozenges, and Flexeril, and takes magnesium when she gets the spasms.     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 No real need for Epipen for this alone.     GDM (gestational diabetes mellitus)     w first pregnancy only     Gestational hypertension      Lymphedema     jackson arms, chest     Migraine      Neuropathy associated with cancer (H)      Papillary carcinoma, follicular variant (H) 6/28/2013    pT3, N1, Mx, thyroid     Post-surgical hypothyroidism      Renal disease     kidney stones     Rhinitis, allergic to other allergen      Right Breast mass and microcalcifications 12/13/2011     Seasonal allergic conjunctivitis      Seasonal " "allergic rhinitis 4/12/11 skin tests pos. for: dog/M/T/G/W--NEGATIVE FOOD TESTS FOR: shrimp, crab, lobster, coconut        Allergies   Allergen Reactions     Baclofen Other (See Comments) and Unknown     Other reaction(s): Edema, chest pain, seizures.   Other reaction(s): Chest Pain, Edema, Headache  Seizures     Buprenorphine      Other reaction(s): Chest Pain  Difficulty swallowing, GI cramping     Clonidine Other (See Comments)     \"Seizures\"     Duloxetine Anaphylaxis and Other (See Comments)     Flushing, tremor/muscle twitching and edema  Serotonin syndrome  Other reaction(s): Ataxia  Flushing, tremor/muscle twitching and edema  Serotonin syndrome  SOB palpitations itching rash     Methocarbamol Other (See Comments)     Swelling and chest pain  Other reaction(s): Angioedema  Swelling in chest     No Clinical Screening - See Comments Shortness Of Breath, Palpitations, Anaphylaxis, Itching, Swelling, Difficulty breathing and Rash     Sukhjinder wipes- oral allergy -  July 2015: throat tightness from a Chinese herbal medicine Wilmer Tran  Other reaction(s): Edema, Tongue Swelling  Sukhjinder wipes  Has anaphylactic reactions to varied environmental things.  Carries an epi-pen  Oral allergy syndrome enviromental- food intolerances and animal dander birch trees potatoes carrots cherries celery apple pears plums peaches parsnip kiwi hazelnuts apricots     Carries epi pen     Nuts Shortness Of Breath     Other reaction(s): Throat Swelling/Closing  Please check herbal formulas for these allergens prior to prescribing.     Serotonin Anxiety, Other (See Comments) and Swelling     Seizures    Anxiety swelling seizures     Suboxone      Severe chest pain, swelling (face, eyes,feet,legs), All over itching, tightness is throat, fatigue, feels anxious, headache     Tizanidine      Contraindicated with Singulair     Amitriptyline Other (See Comments)     Other reaction(s): *Unknown     Amitriptyline Hcl Swelling     Birch Trees " "     Potatoes, carrots, cherries, celery, apple, pears, plums, peaches, parsnip, kiwi, hazelnuts, and apricots,      Blue Dyes Itching     Headaches    headache       Buprenorphine-Naloxone      Other reaction(s): Chest Pain  Difficulty swallowing, GI cramping     Codeine Nausea     Vomiting       Cymbalta Other (See Comments)     Flushing, tremor/muscle twitching and edema     Gabapentin Other (See Comments)     edema  Systemic edema, weaned off from Feb to March per Dr. Dowd.    edema  Other reaction(s): Edema  edema  Systemic edema, weaned off from Feb to March per Dr. Dowd.       Grass      Hydroxyzine      Per patient \"Headache, chest pounding/pressure, shivering/tremors, goosebumps, sweating, swelling in feet and legs, agitation, back pain, joint pain, rigidity of muscles in fingers/wrist/feet, vision, pins/needles whole body-emili face and arms, rapid heart beat, nausea/vomiting, painful urination, itching, dry mouth\"     Metaxalone      Other reaction(s): Vomiting     Mugwort [Artemisia Vulgaris]      Various spices     Pollen Extract      Other reaction(s): *Unknown  Mugwort, ragweed's melons bananas cucumbers zucchini     Pregabalin      Ragweeds      Melons, bananas, cucumbers, zucchini.     Tamoxifen Other (See Comments)     Bone pain, swelling (chest and face); increased anxiety; n & v, migraines;     Topamax      Topiramate      Other reaction(s): Ataxia  seotonin syndrome       Nortriptyline Itching, Visual Disturbance, Swelling, GI Disturbance, Anxiety, Other (See Comments) and Nausea     Other reaction(s): Swelling  Other reaction(s): Edema, Headache  Other reaction(s): Swelling         Current Outpatient Medications:      ACAI RAMIREZ PO, Take 50 mg by mouth, Disp: , Rfl:      acetaminophen (TYLENOL) 325 MG tablet, Take 2 tablets (650 mg) by mouth every 4 hours as needed for mild pain or fever, Disp: 100 tablet, Rfl:      albuterol (VENTOLIN HFA) 108 (90 Base) MCG/ACT inhaler, Inhale 1-2 puffs " into the lungs every 4 hours as needed for shortness of breath / dyspnea or wheezing, Disp: 18 g, Rfl: 1     aluminum chloride (DRYSOL) 20 % external solution, Apply topically At Bedtime, Disp: 60 mL, Rfl: 3     bisacodyl (DULCOLAX) 5 MG EC tablet, Take 3 tablets (15 mg) by mouth daily as needed for constipation, Disp: 30 tablet, Rfl: 0     calcitRIOL (ROCALTROL) 0.25 MCG capsule, 0.5 mcg AM and 0.25 mcg PM. Virtual/video visit requested. Call  to schedule., Disp: 270 capsule, Rfl: 3     calcium carbonate antacid (TUMS ULTRA 1000) 1000 MG CHEW, Take 1 tablet (1,000 mg) by mouth 4 times daily as needed (severe muscle cramps), Disp: , Rfl:      calcium citrate-vitamin D (CALCIUM CITRATE + D3) 315-250 MG-UNIT TABS per tablet, Take 2 tablets by mouth 2 times daily, Disp: 60 tablet, Rfl: 2     celecoxib (CELEBREX) 200 MG capsule, Take 1 capsule (200 mg) by mouth 2 times daily, Disp: 60 capsule, Rfl: 2     cetirizine (ZYRTEC ALLERGY) 10 MG tablet, Take 1 tablet (10 mg) by mouth 3 times daily On hold for lab test., Disp: 90 tablet, Rfl: 0     Cholecalciferol (VITAMIN D3) 50 MCG (2000 UT) CAPS, Take 2,000 Units by mouth daily, Disp: 90 capsule, Rfl: 1     COMPOUND CONTAINING CONTROLLED SUBSTANCE (CMPD RX) - PHARMACY TO MIX COMPOUNDED MEDICATION, Ketamine 6%, Lidocaine 10% in PLO  Apply small amount to area BID PRN, Disp: 600 g, Rfl: 0     cromolyn (OPTICROM) 4 % ophthalmic solution, Place 1 drop into both eyes 4 times daily, Disp: 10 mL, Rfl: 0     cromolyn sodium (NASALCROM) 5.2 MG/ACT AERS Inhaler, SPRAY ONE SPRAY( 1 ML) IN NOSTRIL DAILY, Disp: 1 Bottle, Rfl: 6     [employer] FIBER GUMMY BEARS CHILDREN CHEW, Take 5 g by mouth daily (2 gummy= 5 g =1 serving), Disp: 120 tablet, Rfl: 3     cyclobenzaprine (FLEXERIL) 10 MG tablet, Take 1 tablet (10 mg) by mouth 3 times daily as needed for muscle spasms, Disp: 30 tablet, Rfl: 2     diazepam (VALIUM) 5 MG tablet, Take 1 tablet (5 mg) by mouth nightly as needed for anxiety,  Disp: 20 tablet, Rfl: 0     diclofenac (VOLTAREN) 1 % topical gel, Apply affected area two times daily as needed using enclosed dosing card. (2-4 grams to chest wall), Disp: 100 g, Rfl: 1     EPINEPHrine (EPIPEN 2-CARIDAD) 0.3 MG/0.3ML injection 2-pack, Inject 0.3 mLs (0.3 mg) into the muscle once as needed for anaphylaxis, Disp: 0.6 mL, Rfl: 3     fluticasone (FLONASE) 50 MCG/ACT nasal spray, Spray 1-2 sprays into both nostrils daily, Disp: 16 g, Rfl: 2     furosemide (LASIX) 20 MG tablet, Take 1 tablet (20 mg) by mouth 2 times daily, Disp: 90 tablet, Rfl: 3     HYDROcodone-acetaminophen (NORCO)  MG per tablet, Take 1 tablet by mouth every 4 (four) hours as needed for pain., Disp: , Rfl: 0     ketamine 100 mg/mL (KETALAR) 100 MG/ML intranasal solution, 1 spray, Disp: , Rfl:      KLOR-CON 20 MEQ CR tablet, TAKE ONE TABLET BY MOUTH TWICE DAILY, Disp: 60 tablet, Rfl: 0     levothyroxine (SYNTHROID/LEVOTHROID) 112 MCG tablet, MON to SAT 2 tablet/day;SUN 2.5 tablet, Disp: 180 tablet, Rfl: 4     lidocaine (XYLOCAINE) 5 % external ointment, Apply quarter size amount to chest and back up to 3 times daily as needed for pain., Disp: 350 g, Rfl: 1     lidocaine-prilocaine (EMLA) cream, Apply topically as needed for moderate pain, Disp: 60 g, Rfl: 1     magic mouthwash (FIRST-MOUTHWASH BLM) suspension, Swish and spit 5-10 mLs in mouth every 6 hours as needed for mouth sores, Disp: 237 mL, Rfl: 1     magnesium 200 MG TABS, Take 100 mg by mouth 3 times daily , Disp: , Rfl:      MAGNESIUM GLYCINATE PLUS PO, Take 400 mg by mouth 3 times daily, Disp: , Rfl:      Menaquinone-7 (VITAMIN K2) 100 MCG CAPS, Take 1 capsule by mouth daily, Disp: , Rfl:      methocarbamol (ROBAXIN) 750 MG tablet, Take 1 tablet (750 mg) by mouth 4 times daily . Ok to take a 5th tablet on very severe days., Disp: , Rfl: 3     montelukast (SINGULAIR) 10 MG tablet, Take 2 tablets (20 mg) by mouth 2 times daily, Disp: 120 tablet, Rfl: 10     morphine (MSIR)  15 MG IR tablet, Take 15 mg by mouth every 4 hours as needed for severe pain, Disp: , Rfl:      naloxone (NARCAN) nasal spray, Spray 1 spray (4 mg) into one nostril alternating nostrils as needed for opioid reversal every 2-3 minutes until assistance arrives, Disp: 0.2 mL, Rfl: 0     olopatadine (PATANOL) 0.1 % ophthalmic solution, Apply 1 drop to eye, Disp: , Rfl:      ondansetron (ZOFRAN-ODT) 8 MG ODT tab, Take 1 tablet (8 mg) by mouth every 8 hours as needed, Disp: 30 tablet, Rfl: 3     order for DME, Compression socks - knee high 20-30 mmHg zippered if possible, Disp: 1 Units, Rfl: 11     oxyCODONE IR (ROXICODONE) 10 MG tablet, Take 10 mg by mouth every 4 hours as needed, Disp: , Rfl:      polyethylene glycol (MIRALAX) powder, Take 17 g (1 capful) by mouth daily, Disp: 510 g, Rfl: 0     Probiotic Product (PROBIOTIC DAILY PO), Take 1 capsule by mouth daily Lacto acid bifidobacterium, Disp: , Rfl:      prochlorperazine (COMPAZINE) 10 MG tablet, Take 10 mg by mouth as needed, Disp: , Rfl: 3     ranitidine (ZANTAC) 75 MG tablet, Take 1 tablet (75 mg) by mouth 3 times daily, Disp: 90 tablet, Rfl: 0     senna-docusate (SENOKOT-S;PERICOLACE) 8.6-50 MG per tablet, Take 1-2 tablets by mouth 2 times daily as needed for constipation, Disp: 60 tablet, Rfl: 0     SUMAtriptan (IMITREX) 50 MG tablet, Take 1 tablet by mouth as needed for migraine . May repeat dose in 2 hours as needed. Maximum dose 4 tablets in 24 hours, Disp: 9 tablet, Rfl: 0     traMADol (ULTRAM) 50 MG tablet, Take 1 tablet (50 mg) by mouth every 6 hours as needed for severe pain, Disp: 30 tablet, Rfl: 0     triamcinolone (KENALOG) 0.025 % ointment, Apply topically as needed, Disp: , Rfl: 3     Triamcinolone Acetonide (AZMACORT IN), Inhale 2 puffs into the lungs as needed, Disp: , Rfl:      UNABLE TO FIND, Take 2 capsules by mouth 3 times daily Muscle Mag. 2 caps contain B1 20mg, B2 20mg, B6 10mg, magesium 20mg, manganese 2mg., Disp: , Rfl:      UNABLE TO  "FIND, 3 tablets 3 times daily MEDICATION NAME: calcium D-Glucarate  3 caps contain 180mg of elemental calcium., Disp: , Rfl:      UNABLE TO FIND, 1 tablet daily MEDICATION NAME: Pure Encapsulations, Disp: , Rfl:     REVIEW OF SYSTEMS:  12-point ROS reviewed and negative other than that mentioned in HPI.     Objective     VITAL SIGNS:  BP (!) 159/84 (BP Location: Left arm, Patient Position: Sitting)   Pulse 87   Temp 98.3  F (36.8  C) (Oral)   Ht 1.651 m (5' 5\")   Wt 103.4 kg (227 lb 14.4 oz)   SpO2 99%   BMI 37.92 kg/m       PHYSICAL EXAM:  Vitals reviewed.  General: Awake, alert, in no acute distress.   Lymph: No cervical, supraclavicular, or axillary lymphadenopathy appreciated.   Chest: Some infraclavicular fat padding.   CV: Regular rate and rhythm. No murmurs, rubs, or gallops appreciated.   Resp: Good inspiratory effort, lungs clear to auscultation bilaterally.  Abd: Pain to palpation in epigastric region and bilateral costal margins. Slightly distended but soft. No guarding or rebound.  Back: No pain to palpation down entire spine.   Ext: Significant nonpitting edema of bilateral lower extremities.  Neuro: CN II-XII grossly intact. Moves all extremities spontaneously.   Skin: Small bruise in central abdomen.     LABS:  Lab Results   Component Value Date    WBC 4.3 11/11/2020    HGB 12.5 11/11/2020    HCT 38.3 11/11/2020    MCV 92 11/11/2020     11/11/2020    INR 1.00 12/13/2019    PTT 26 09/01/2017     Lab Results   Component Value Date     11/11/2020    POTASSIUM 4.2 11/11/2020    CR 0.94 11/11/2020    BUN 16 11/11/2020    CHLORIDE 106 11/11/2020    ELMO 8.2 (L) 11/11/2020     (H) 11/11/2020      Lab Results   Component Value Date    ALT 42 11/11/2020    AST 37 11/11/2020    ALKPHOS 100 11/11/2020    BILITOTAL 0.3 11/11/2020      Lab Results   Component Value Date     11/11/2020    URIC 7.2 (H) 01/06/2020        Assessment & Plan   #DLBCL, non-GCB immunophenotype, with c-MYC " and BCL-2 gains  Dx 8/2017, stage IV with rib/vertebral body involvement. Treated with DA-EPOCH-R x 6 cycles with CR. Last CT CAP on 2/2020 for chest pain confirmed ongoing CR. CBC and LDH normal today.      She has developed significant abdominal cramps/spasms and worsening lower extremity edema (nonpitting) over the last 6 months. Now being managed with Dilaudid, ketamine lozenges, Flexeril, and magnesium at [facility]. No evidence of heart or renal failure. She does have history of a number of abdominal surgeries including cholecystectomy and gastric bypass.     With her high-risk lymphoma history, we will rule out lymphoma recurrence causing these symptoms with a restaging CT chest/abd/pelvis. If negative for lymphoma, recommend further work-up of these symptoms with her PCP. Consider endoscopy.     #Hx right breast cancer  Follows with Dr. Crawley. Next appt 1/2021.     PLAN:  - CT CAP as soon as available  - Otherwise, f/u in 6 months if no evidence of lymphoma     This patient was discussed with Dr. Sauceda.     Anne Davidson MD  Hematology-Oncology Fellow, PGY5    Today, I have  discussed the recommendations with the trainee and agree with the note as above.     Garima Sauceda MD  Professor of Medicine

## 2020-11-12 ENCOUNTER — COMMUNICATION - HEALTHEAST (OUTPATIENT)
Dept: PALLIATIVE MEDICINE | Facility: OTHER | Age: 60
End: 2020-11-12

## 2020-11-12 DIAGNOSIS — G89.29 OTHER CHRONIC PAIN: ICD-10-CM

## 2020-11-12 NOTE — PROGRESS NOTES
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 Dx 9/2017 and s/p DA-R-EPOCH x6 (completed 1/2018). Now in CR1.      Hx [id number] ER/TN+ HER2- right breast cancer:  - Breast cancer in 2011 s/p bilateral mastectomies and BENJIE/BSO  - Severe body aches secondary to Tamoxifen, Tamoxifen stopped 1/2020  - Hx of papillary thyroid cancer s/p total thyroidectomy  - Followed by Dr. Crawley/Dr. Castro       DLBCL:  - 8/2017 developed dramatically worse chest and back pain. CT 9/1/17 showed lytic lesion right 10th rib extending to right T9-10 neural foramen with vertebral body invasion. There was associated conglomerate of lymphadenopathy around the mid T-spine. She had a CT guided biopsy showing B-cell high grade lymphoma. CD79a positive and focally weakly CD30 positive, pos for CD20, BCL6, BCL2 and MUM1, and CD10 and CD21 negative. Ki-67 proliferative index is greater than 90-95%. The immunohistochemistry is suggestive of non-GCB immunophenotype of diffuse large B-cell lymphoma. The cytogenetics did not show rearrangement of the BCL2 or c-MYC. There is the presence of BCL6 rearrangement in 78% of cells and gains of MYC and BCL2, but no amplifications.\"  Staging LP and BMBx were negative for lymphoma.   - She started DA-REPOCH 10/6/17. She did well with chemo other than rigors during CIVI. Post cycle 1 complicated by mucositis and worsening of chronic LE peripheral edema.   Completed 6 cycles by January/2018 with EOT PET-CT showing CR.  - 2/6/20 CT CAP shows ongoing CR.     Interval History:  Elvin has not been feeling well over the last year. Main symptoms are worsening abdominal pain/cramps and increased lower extremity edema over the last 6 months. The abdominal cramps are mostly in upper abdomen, " "last an hour or more, and are so severe that \"they turn her skin black and blue.\" Does not seem to be related to bowel movements, eating, or activity. She does get nausea with eating however. Otherwise bowels are regular with no blood noted. She also has more shortness of breath with activity and knots in her upper chest. She has gained 30 lb in the last year. Lower extremity edema has also worsened, she is using compression stockings but not sure if helping.     She previously followed with Palliative Medicine here regarding chronic pain issues, but now follows with Dr. Berry at [facility]. She is on PRN Dilaudid, ketamine lozenges, and Flexeril, and takes magnesium when she gets the spasms.     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 No real need for Epipen for this alone.     GDM (gestational diabetes mellitus)     w first pregnancy only     Gestational hypertension      Lymphedema     jackson arms, chest     Migraine      Neuropathy associated with cancer (H)      Papillary carcinoma, follicular variant (H) 6/28/2013    pT3, N1, Mx, thyroid     Post-surgical hypothyroidism      Renal disease     kidney stones     Rhinitis, allergic to other allergen      Right Breast mass and microcalcifications 12/13/2011     Seasonal allergic conjunctivitis      Seasonal allergic rhinitis 4/12/11 skin tests pos. for: dog/M/T/G/W--NEGATIVE FOOD TESTS FOR: shrimp, crab, lobster, coconut        Allergies   Allergen Reactions     Baclofen Other (See Comments) and Unknown     Other reaction(s): Edema, chest pain, seizures.   Other reaction(s): Chest Pain, Edema, " "Headache  Seizures     Buprenorphine      Other reaction(s): Chest Pain  Difficulty swallowing, GI cramping     Clonidine Other (See Comments)     \"Seizures\"     Duloxetine Anaphylaxis and Other (See Comments)     Flushing, tremor/muscle twitching and edema  Serotonin syndrome  Other reaction(s): Ataxia  Flushing, tremor/muscle twitching and edema  Serotonin syndrome  SOB palpitations itching rash     Methocarbamol Other (See Comments)     Swelling and chest pain  Other reaction(s): Angioedema  Swelling in chest     No Clinical Screening - See Comments Shortness Of Breath, Palpitations, Anaphylaxis, Itching, Swelling, Difficulty breathing and Rash     Sukhjinder wipes- oral allergy -  July 2015: throat tightness from a Chinese herbal medicine Wilmer Tran  Other reaction(s): Edema, Tongue Swelling  Sukhjinder wipes  Has anaphylactic reactions to varied environmental things.  Carries an epi-pen  Oral allergy syndrome enviromental- food intolerances and animal dander birch trees potatoes carrots cherries celery apple pears plums peaches parsnip kiwi hazelnuts apricots     Carries epi pen     Nuts Shortness Of Breath     Other reaction(s): Throat Swelling/Closing  Please check herbal formulas for these allergens prior to prescribing.     Serotonin Anxiety, Other (See Comments) and Swelling     Seizures    Anxiety swelling seizures     Suboxone      Severe chest pain, swelling (face, eyes,feet,legs), All over itching, tightness is throat, fatigue, feels anxious, headache     Tizanidine      Contraindicated with Singulair     Amitriptyline Other (See Comments)     Other reaction(s): *Unknown     Amitriptyline Hcl Swelling     Birch Trees      Potatoes, carrots, cherries, celery, apple, pears, plums, peaches, parsnip, kiwi, hazelnuts, and apricots,      Blue Dyes Itching     Headaches    headache       Buprenorphine-Naloxone      Other reaction(s): Chest Pain  Difficulty swallowing, GI cramping     Codeine Nausea     " "Vomiting       Cymbalta Other (See Comments)     Flushing, tremor/muscle twitching and edema     Gabapentin Other (See Comments)     edema  Systemic edema, weaned off from Feb to March per Dr. Dowd.    edema  Other reaction(s): Edema  edema  Systemic edema, weaned off from Feb to March per Dr. Dowd.       Grass      Hydroxyzine      Per patient \"Headache, chest pounding/pressure, shivering/tremors, goosebumps, sweating, swelling in feet and legs, agitation, back pain, joint pain, rigidity of muscles in fingers/wrist/feet, vision, pins/needles whole body-emili face and arms, rapid heart beat, nausea/vomiting, painful urination, itching, dry mouth\"     Metaxalone      Other reaction(s): Vomiting     Mugwort [Artemisia Vulgaris]      Various spices     Pollen Extract      Other reaction(s): *Unknown  Mugwort, ragweed's melons bananas cucumbers zucchini     Pregabalin      Ragweeds      Melons, bananas, cucumbers, zucchini.     Tamoxifen Other (See Comments)     Bone pain, swelling (chest and face); increased anxiety; n & v, migraines;     Topamax      Topiramate      Other reaction(s): Ataxia  seotonin syndrome       Nortriptyline Itching, Visual Disturbance, Swelling, GI Disturbance, Anxiety, Other (See Comments) and Nausea     Other reaction(s): Swelling  Other reaction(s): Edema, Headache  Other reaction(s): Swelling         Current Outpatient Medications:      ACAI RAMIREZ PO, Take 50 mg by mouth, Disp: , Rfl:      acetaminophen (TYLENOL) 325 MG tablet, Take 2 tablets (650 mg) by mouth every 4 hours as needed for mild pain or fever, Disp: 100 tablet, Rfl:      albuterol (VENTOLIN HFA) 108 (90 Base) MCG/ACT inhaler, Inhale 1-2 puffs into the lungs every 4 hours as needed for shortness of breath / dyspnea or wheezing, Disp: 18 g, Rfl: 1     aluminum chloride (DRYSOL) 20 % external solution, Apply topically At Bedtime, Disp: 60 mL, Rfl: 3     bisacodyl (DULCOLAX) 5 MG EC tablet, Take 3 tablets (15 mg) by mouth daily " as needed for constipation, Disp: 30 tablet, Rfl: 0     calcitRIOL (ROCALTROL) 0.25 MCG capsule, 0.5 mcg AM and 0.25 mcg PM. Virtual/video visit requested. Call  to schedule., Disp: 270 capsule, Rfl: 3     calcium carbonate antacid (TUMS ULTRA 1000) 1000 MG CHEW, Take 1 tablet (1,000 mg) by mouth 4 times daily as needed (severe muscle cramps), Disp: , Rfl:      calcium citrate-vitamin D (CALCIUM CITRATE + D3) 315-250 MG-UNIT TABS per tablet, Take 2 tablets by mouth 2 times daily, Disp: 60 tablet, Rfl: 2     celecoxib (CELEBREX) 200 MG capsule, Take 1 capsule (200 mg) by mouth 2 times daily, Disp: 60 capsule, Rfl: 2     cetirizine (ZYRTEC ALLERGY) 10 MG tablet, Take 1 tablet (10 mg) by mouth 3 times daily On hold for lab test., Disp: 90 tablet, Rfl: 0     Cholecalciferol (VITAMIN D3) 50 MCG (2000 UT) CAPS, Take 2,000 Units by mouth daily, Disp: 90 capsule, Rfl: 1     COMPOUND CONTAINING CONTROLLED SUBSTANCE (CMPD RX) - PHARMACY TO MIX COMPOUNDED MEDICATION, Ketamine 6%, Lidocaine 10% in PLO  Apply small amount to area BID PRN, Disp: 600 g, Rfl: 0     cromolyn (OPTICROM) 4 % ophthalmic solution, Place 1 drop into both eyes 4 times daily, Disp: 10 mL, Rfl: 0     cromolyn sodium (NASALCROM) 5.2 MG/ACT AERS Inhaler, SPRAY ONE SPRAY( 1 ML) IN NOSTRIL DAILY, Disp: 1 Bottle, Rfl: 6     [employer] FIBER GUMMY BEARS CHILDREN CHEW, Take 5 g by mouth daily (2 gummy= 5 g =1 serving), Disp: 120 tablet, Rfl: 3     cyclobenzaprine (FLEXERIL) 10 MG tablet, Take 1 tablet (10 mg) by mouth 3 times daily as needed for muscle spasms, Disp: 30 tablet, Rfl: 2     diazepam (VALIUM) 5 MG tablet, Take 1 tablet (5 mg) by mouth nightly as needed for anxiety, Disp: 20 tablet, Rfl: 0     diclofenac (VOLTAREN) 1 % topical gel, Apply affected area two times daily as needed using enclosed dosing card. (2-4 grams to chest wall), Disp: 100 g, Rfl: 1     EPINEPHrine (EPIPEN 2-CARIDAD) 0.3 MG/0.3ML injection 2-pack, Inject 0.3 mLs (0.3 mg) into the  muscle once as needed for anaphylaxis, Disp: 0.6 mL, Rfl: 3     fluticasone (FLONASE) 50 MCG/ACT nasal spray, Spray 1-2 sprays into both nostrils daily, Disp: 16 g, Rfl: 2     furosemide (LASIX) 20 MG tablet, Take 1 tablet (20 mg) by mouth 2 times daily, Disp: 90 tablet, Rfl: 3     HYDROcodone-acetaminophen (NORCO)  MG per tablet, Take 1 tablet by mouth every 4 (four) hours as needed for pain., Disp: , Rfl: 0     ketamine 100 mg/mL (KETALAR) 100 MG/ML intranasal solution, 1 spray, Disp: , Rfl:      KLOR-CON 20 MEQ CR tablet, TAKE ONE TABLET BY MOUTH TWICE DAILY, Disp: 60 tablet, Rfl: 0     levothyroxine (SYNTHROID/LEVOTHROID) 112 MCG tablet, MON to SAT 2 tablet/day;SUN 2.5 tablet, Disp: 180 tablet, Rfl: 4     lidocaine (XYLOCAINE) 5 % external ointment, Apply quarter size amount to chest and back up to 3 times daily as needed for pain., Disp: 350 g, Rfl: 1     lidocaine-prilocaine (EMLA) cream, Apply topically as needed for moderate pain, Disp: 60 g, Rfl: 1     magic mouthwash (FIRST-MOUTHWASH BLM) suspension, Swish and spit 5-10 mLs in mouth every 6 hours as needed for mouth sores, Disp: 237 mL, Rfl: 1     magnesium 200 MG TABS, Take 100 mg by mouth 3 times daily , Disp: , Rfl:      MAGNESIUM GLYCINATE PLUS PO, Take 400 mg by mouth 3 times daily, Disp: , Rfl:      Menaquinone-7 (VITAMIN K2) 100 MCG CAPS, Take 1 capsule by mouth daily, Disp: , Rfl:      methocarbamol (ROBAXIN) 750 MG tablet, Take 1 tablet (750 mg) by mouth 4 times daily . Ok to take a 5th tablet on very severe days., Disp: , Rfl: 3     montelukast (SINGULAIR) 10 MG tablet, Take 2 tablets (20 mg) by mouth 2 times daily, Disp: 120 tablet, Rfl: 10     morphine (MSIR) 15 MG IR tablet, Take 15 mg by mouth every 4 hours as needed for severe pain, Disp: , Rfl:      naloxone (NARCAN) nasal spray, Spray 1 spray (4 mg) into one nostril alternating nostrils as needed for opioid reversal every 2-3 minutes until assistance arrives, Disp: 0.2 mL, Rfl: 0      olopatadine (PATANOL) 0.1 % ophthalmic solution, Apply 1 drop to eye, Disp: , Rfl:      ondansetron (ZOFRAN-ODT) 8 MG ODT tab, Take 1 tablet (8 mg) by mouth every 8 hours as needed, Disp: 30 tablet, Rfl: 3     order for DME, Compression socks - knee high 20-30 mmHg zippered if possible, Disp: 1 Units, Rfl: 11     oxyCODONE IR (ROXICODONE) 10 MG tablet, Take 10 mg by mouth every 4 hours as needed, Disp: , Rfl:      polyethylene glycol (MIRALAX) powder, Take 17 g (1 capful) by mouth daily, Disp: 510 g, Rfl: 0     Probiotic Product (PROBIOTIC DAILY PO), Take 1 capsule by mouth daily Lacto acid bifidobacterium, Disp: , Rfl:      prochlorperazine (COMPAZINE) 10 MG tablet, Take 10 mg by mouth as needed, Disp: , Rfl: 3     ranitidine (ZANTAC) 75 MG tablet, Take 1 tablet (75 mg) by mouth 3 times daily, Disp: 90 tablet, Rfl: 0     senna-docusate (SENOKOT-S;PERICOLACE) 8.6-50 MG per tablet, Take 1-2 tablets by mouth 2 times daily as needed for constipation, Disp: 60 tablet, Rfl: 0     SUMAtriptan (IMITREX) 50 MG tablet, Take 1 tablet by mouth as needed for migraine . May repeat dose in 2 hours as needed. Maximum dose 4 tablets in 24 hours, Disp: 9 tablet, Rfl: 0     traMADol (ULTRAM) 50 MG tablet, Take 1 tablet (50 mg) by mouth every 6 hours as needed for severe pain, Disp: 30 tablet, Rfl: 0     triamcinolone (KENALOG) 0.025 % ointment, Apply topically as needed, Disp: , Rfl: 3     Triamcinolone Acetonide (AZMACORT IN), Inhale 2 puffs into the lungs as needed, Disp: , Rfl:      UNABLE TO FIND, Take 2 capsules by mouth 3 times daily Muscle Mag. 2 caps contain B1 20mg, B2 20mg, B6 10mg, magesium 20mg, manganese 2mg., Disp: , Rfl:      UNABLE TO FIND, 3 tablets 3 times daily MEDICATION NAME: calcium D-Glucarate  3 caps contain 180mg of elemental calcium., Disp: , Rfl:      UNABLE TO FIND, 1 tablet daily MEDICATION NAME: Pure Encapsulations, Disp: , Rfl:     REVIEW OF SYSTEMS:  12-point ROS reviewed and negative other than that  "mentioned in HPI.     Objective     VITAL SIGNS:  BP (!) 159/84 (BP Location: Left arm, Patient Position: Sitting)   Pulse 87   Temp 98.3  F (36.8  C) (Oral)   Ht 1.651 m (5' 5\")   Wt 103.4 kg (227 lb 14.4 oz)   SpO2 99%   BMI 37.92 kg/m       PHYSICAL EXAM:  Vitals reviewed.  General: Awake, alert, in no acute distress.   Lymph: No cervical, supraclavicular, or axillary lymphadenopathy appreciated.   Chest: Some infraclavicular fat padding.   CV: Regular rate and rhythm. No murmurs, rubs, or gallops appreciated.   Resp: Good inspiratory effort, lungs clear to auscultation bilaterally.  Abd: Pain to palpation in epigastric region and bilateral costal margins. Slightly distended but soft. No guarding or rebound.  Back: No pain to palpation down entire spine.   Ext: Significant nonpitting edema of bilateral lower extremities.  Neuro: CN II-XII grossly intact. Moves all extremities spontaneously.   Skin: Small bruise in central abdomen.     LABS:  Lab Results   Component Value Date    WBC 4.3 11/11/2020    HGB 12.5 11/11/2020    HCT 38.3 11/11/2020    MCV 92 11/11/2020     11/11/2020    INR 1.00 12/13/2019    PTT 26 09/01/2017     Lab Results   Component Value Date     11/11/2020    POTASSIUM 4.2 11/11/2020    CR 0.94 11/11/2020    BUN 16 11/11/2020    CHLORIDE 106 11/11/2020    ELMO 8.2 (L) 11/11/2020     (H) 11/11/2020      Lab Results   Component Value Date    ALT 42 11/11/2020    AST 37 11/11/2020    ALKPHOS 100 11/11/2020    BILITOTAL 0.3 11/11/2020      Lab Results   Component Value Date     11/11/2020    URIC 7.2 (H) 01/06/2020        Assessment & Plan   #DLBCL, non-GCB immunophenotype, with c-MYC and BCL-2 gains  Dx 8/2017, stage IV with rib/vertebral body involvement. Treated with DA-EPOCH-R x 6 cycles with CR. Last CT CAP on 2/2020 for chest pain confirmed ongoing CR. CBC and LDH normal today.      She has developed significant abdominal cramps/spasms and worsening lower " extremity edema (nonpitting) over the last 6 months. Now being managed with Dilaudid, ketamine lozenges, Flexeril, and magnesium at [facility]. No evidence of heart or renal failure. She does have history of a number of abdominal surgeries including cholecystectomy and gastric bypass.     With her high-risk lymphoma history, we will rule out lymphoma recurrence causing these symptoms with a restaging CT chest/abd/pelvis. If negative for lymphoma, recommend further work-up of these symptoms with her PCP. Consider endoscopy.     #Hx right breast cancer  Follows with Dr. Crawley. Next appt 1/2021.     PLAN:  - CT CAP as soon as available  - Otherwise, f/u in 6 months if no evidence of lymphoma     This patient was discussed with Dr. Sauceda.     Anne Davidson MD  Hematology-Oncology Fellow, PGY5    Today, I have  discussed the recommendations with the trainee and agree with the note as above.     Garima Sauceda MD  Professor of Medicine

## 2020-11-13 ENCOUNTER — ANCILLARY PROCEDURE (OUTPATIENT)
Dept: CT IMAGING | Facility: CLINIC | Age: 60
End: 2020-11-13
Payer: COMMERCIAL

## 2020-11-13 ENCOUNTER — COMMUNICATION - HEALTHEAST (OUTPATIENT)
Dept: PALLIATIVE MEDICINE | Facility: OTHER | Age: 60
End: 2020-11-13

## 2020-11-13 ENCOUNTER — HOSPITAL ENCOUNTER (OUTPATIENT)
Dept: PALLIATIVE MEDICINE | Facility: OTHER | Age: 60
Discharge: HOME | End: 2020-11-13

## 2020-11-13 DIAGNOSIS — M79.2 NEUROPATHIC PAIN: ICD-10-CM

## 2020-11-13 DIAGNOSIS — C83.38 DIFFUSE LARGE B-CELL LYMPHOMA OF LYMPH NODES OF MULTIPLE REGIONS (H): ICD-10-CM

## 2020-11-13 DIAGNOSIS — R10.13 ABDOMINAL PAIN, EPIGASTRIC: ICD-10-CM

## 2020-11-13 DIAGNOSIS — G89.29 OTHER CHRONIC PAIN: ICD-10-CM

## 2020-11-13 PROCEDURE — 74177 CT ABD & PELVIS W/CONTRAST: CPT | Mod: TC

## 2020-11-13 PROCEDURE — 71260 CT THORAX DX C+: CPT | Mod: TC

## 2020-11-13 RX ORDER — IOPAMIDOL 755 MG/ML
100 INJECTION, SOLUTION INTRAVASCULAR ONCE
Status: COMPLETED | OUTPATIENT
Start: 2020-11-13 | End: 2020-11-13

## 2020-11-13 RX ADMIN — IOPAMIDOL 100 ML: 755 INJECTION, SOLUTION INTRAVASCULAR at 09:26

## 2020-11-14 PROBLEM — D69.6 THROMBOCYTOPENIA, UNSPECIFIED (H): Status: RESOLVED | Noted: 2019-11-22 | Resolved: 2020-11-14

## 2020-11-17 LAB
6MAM UR QL: NOT DETECTED
7AMINOCLONAZEPAM UR QL: NOT DETECTED
A-OH ALPRAZ UR QL: NOT DETECTED
ALPRAZ UR QL: NOT DETECTED
AMPHET UR QL SCN: NOT DETECTED
BARBITURATES UR QL: NOT DETECTED
BUPRENORPHINE UR QL: NOT DETECTED
BZE UR QL: NOT DETECTED
CARBOXYTHC UR QL: NOT DETECTED
CARISOPRODOL UR QL: NOT DETECTED
CLONAZEPAM UR QL: NOT DETECTED
CODEINE UR QL: NOT DETECTED
CREAT UR-MCNC: 159.8 MG/DL (ref 20–400)
DIAZEPAM UR QL: NOT DETECTED
ETHYL GLUCURONIDE UR QL: NOT DETECTED
FENTANYL UR QL: NOT DETECTED
HYDROCODONE UR QL: NOT DETECTED
HYDROMORPHONE UR QL: PRESENT
LORAZEPAM UR QL: NOT DETECTED
MDA UR QL: NOT DETECTED
MDEA UR QL: NOT DETECTED
MDMA UR QL: NOT DETECTED
ME-PHENIDATE UR QL: NOT DETECTED
MEPERIDINE UR QL: NOT DETECTED
METHADONE UR QL: NOT DETECTED
METHAMPHET UR QL: NOT DETECTED
MIDAZOLAM UR QL SCN: NOT DETECTED
MORPHINE UR QL: NOT DETECTED
NORBUPRENORPHINE UR QL CFM: NOT DETECTED
NORDIAZEPAM UR QL: NOT DETECTED
NORFENTANYL UR QL: NOT DETECTED
NORHYDROCODONE UR QL CFM: NOT DETECTED
NOROXYCODONE UR QL CFM: NOT DETECTED
NOROXYMORPHONE UR QL SCN: NOT DETECTED
OXAZEPAM UR QL: NOT DETECTED
OXYCODONE UR QL: NOT DETECTED
OXYMORPHONE UR QL: NOT DETECTED
PATHOLOGY STUDY: NORMAL
PCP UR QL: NOT DETECTED
PHENTERMINE UR QL: NOT DETECTED
PROPOXYPH UR QL: NOT DETECTED
SERVICE CMNT-IMP: NORMAL
TAPENTADOL UR QL SCN: NOT DETECTED
TAPENTADOL UR QL SCN: NOT DETECTED
TEMAZEPAM UR QL: NOT DETECTED
TRAMADOL UR QL: NOT DETECTED
ZOLPIDEM UR QL: NOT DETECTED

## 2020-11-19 ENCOUNTER — TELEPHONE (OUTPATIENT)
Dept: ENDOCRINOLOGY | Facility: CLINIC | Age: 60
End: 2020-11-19

## 2020-11-19 ENCOUNTER — VIRTUAL VISIT (OUTPATIENT)
Dept: ONCOLOGY | Facility: CLINIC | Age: 60
End: 2020-11-19
Attending: INTERNAL MEDICINE
Payer: COMMERCIAL

## 2020-11-19 DIAGNOSIS — C73 PAPILLARY CARCINOMA, FOLLICULAR VARIANT (H): ICD-10-CM

## 2020-11-19 DIAGNOSIS — R60.0 LOCALIZED EDEMA: ICD-10-CM

## 2020-11-19 DIAGNOSIS — Z85.3 PERSONAL HISTORY OF MALIGNANT NEOPLASM OF BREAST: ICD-10-CM

## 2020-11-19 DIAGNOSIS — M62.838 SPASM OF MUSCLE: ICD-10-CM

## 2020-11-19 DIAGNOSIS — C83.30 DIFFUSE LARGE B-CELL LYMPHOMA, UNSPECIFIED BODY REGION (H): Primary | ICD-10-CM

## 2020-11-19 PROCEDURE — 99214 OFFICE O/P EST MOD 30 MIN: CPT | Mod: 95 | Performed by: INTERNAL MEDICINE

## 2020-11-19 NOTE — PROGRESS NOTES
"Tisha Arias is a 60 year old female who is being evaluated via a billable video visit.      The patient has been notified of following:     \"This video visit will be conducted via a call between you and your physician/provider. We have found that certain health care needs can be provided without the need for an in-person physical exam.  This service lets us provide the care you need with a video conversation.  If a prescription is necessary we can send it directly to your pharmacy.  If lab work is needed we can place an order for that and you can then stop by our lab to have the test done at a later time.    Video visits are billed at different rates depending on your insurance coverage.  Please reach out to your insurance provider with any questions.    If during the course of the call the physician/provider feels a video visit is not appropriate, you will not be charged for this service.\"    Patient has given verbal consent for Video visit? Yes  How would you like to obtain your AVS? Mail a copy  If you are dropped from the video visit, the video invite should be resent to: Text to cell phone: [phone]  Will anyone else be joining your video visit? No      Vitals - Patient Reported  Weight (Patient Reported): 102.1 kg (225 lb)  Height (Patient Reported): 165.1 cm (5' 5\")  BMI (Based on Pt Reported Ht/Wt): 37.44  Pain Score: Severe Pain (7)    Leilani Camarena CMA November 19, 2020  10:20 AM       Video-Visit Details    Type of service:  Video Visit    Video Time: 20 min  Originating Location (pt. Location): Home    Distant Location (provider location):  [facility]     Platform used for Video Visit: Rylan Crawley MD    November 19, 2020    REASON FOR VISIT:  F/u breast cancer and thyroid cancer and lymphoma     HISTORY OF PRESENT ILLNESS:  Tisha Arias is a 60 year-old woman, postmenopausal, with a history of T1c N0 M0, infiltrating ductal carcinoma " of the right breast with a strong family history of breast cancer as well as thyroid cancer and a history of DLBCL. BRCA1 and 2 gene testing negative.    Cancer Treatment History for her breast cancer and DLBCL:  -She was diagnosed with breast cancer in 12/2011.    -She underwent bilateral mastectomy with immediate reconstruction on 01/30/2012 by Dr. Daugherty.  Her final pathology revealed 1.6-cm, infiltrating ductal carcinoma, grade 1/3, no angiolymphatic space invasion, no nipple or skin invasion.  There was associated DCIS, and the margins were free of tumor superiorly, anterior margin by 0.1 cm, and widely free at remaining margins.  This was ER/IL-positive, HER2-negative in the vast majority of cells and non-amplified with a ratio of 1.1.  She had an Oncotype test performed, which revealed a low-risk score of 11.  That put her overall risk of recurrence at about 7% after 5 years of tamoxifen.    - in 02/2012, she was started on Arimidex.    - 12/2012, she had prophylactic hysterectomy and oophorectomy, the pathology of which was benign.    - Her course has been complicated by extreme chest wall pain, myofascial pain, and neuropathic pain.  She has gone through several different clinics and was put on several different medications, which were not doing her any good.  Eventually she was weaned off all the medications and is currently doing only PT with myofascial pain maneuvers with symptomatic improvement.   - 2/2014 switched from arimidex to tamoxifen  - presented to the ER on 9/1/17 for chest pain. CT-PA was negative for PE but showed right 3cm paraspinal mass and subcarinal adenopathy. PET on 9/6/17 showed the paraspinal mass to be hypermetabolic along with hilar and mediastinal nodes.   - biopsy of T10 vertebral body on 9/15/17 showed high-grade B cell lymphoma   - FNA EUS of subcarinal LN on 9/14 showed mostly necrotic tissue with suspicion for malignancy   - pt received DA-EPOCH under the care of   Garmia Sauceda    Thyroid cancer history: Incidentally, given that she was having so many symptoms, she underwent a PET scan in 4/2013, which revealed a thyroid nodule which was biopsied and was consistent with papillary carcinoma.  She has undergone resection as well as radioiodine treatment since and has done relatively well from the surgery.  She follows up with Dr. Castro for the same.  She unfortunately required etoh ablation therapy over the thyroid lymph nodes.   She remains under the care of Dr Avelina Castro.       INTERVAL HISTORY: Elvin comes in today for follow up.  She is no longer on tamoxifen.      She reports having a difficult year.  She continues to have intense muscle spasms.  She developed them again last night.  She had intense spasms across her thighs and lower abdomen.  When she gets these, she takes magnesium, but it didn't help.  After 30 minutes, and ice packs, it seemed to anu.      No nsaids.   No travel.  No rash.  No nausea.  No vomiting.  No diarrhea.        She is not having fevers.  She is still having hot flashes.      She is using flexeril and voltaren gel for her pain.   She is using ketamine trochars. She is being seen by Dr Berry in  for these medications.       She has gained about 30 pounds this past year.  She thinks a lot of this is from edema in her lower extremities.  She has questions about this and wonders what this is from, who to go to regarding these symptoms.  She is finding it difficult navigating multiple providers, and feeling like no one is really taking charge of her health.      Her 10-point review of systems is otherwise negative.     MEDICATIONS:  reviewed     PHYSICAL EXAMINATION:    VITAL SIGNS:There were no vitals taken for this visit.   GENERAL: Healthy, alert and no distress  EYES: Eyes grossly normal to inspection.  No discharge or erythema, or obvious scleral/conjunctival abnormalities.  RESP: No audible wheeze, cough, or visible  cyanosis.  No visible retractions or increased work of breathing.    SKIN: Visible skin clear. No significant rash, abnormal pigmentation or lesions.  NEURO: Cranial nerves grossly intact.  Mentation and speech appropriate for age.  PSYCH: Mentation appears normal, affect normal/bright, judgement and insight intact, normal speech and appearance well-groomed.     Labs:   CT CAP -   IMPRESSION:  1.  Mild splenomegaly with the spleen measuring 13.7 cm, previously  12.7 cm.  2.  No other evidence for recurrent or metastatic disease in the  chest, abdomen and pelvis.  3.  Stable biliary dilatation.    Lab Results   Component Value Date    WBC 4.3 11/11/2020     Lab Results   Component Value Date    RBC 4.15 11/11/2020     Lab Results   Component Value Date    HGB 12.5 11/11/2020     Lab Results   Component Value Date    HCT 38.3 11/11/2020     No components found for: MCT  Lab Results   Component Value Date    MCV 92 11/11/2020     Lab Results   Component Value Date    MCH 30.1 11/11/2020     Lab Results   Component Value Date    MCHC 32.6 11/11/2020     Lab Results   Component Value Date    RDW 11.9 11/11/2020     Lab Results   Component Value Date     11/11/2020       Recent Labs   Lab Test 11/11/20  1621 05/05/20  1408    140   POTASSIUM 4.2 4.0   CHLORIDE 106 106   CO2 31 28   ANIONGAP 4 6   * 97   BUN 16 13   CR 0.94 0.90   ELMO 8.2* 8.3*     Liver Function Studies -   Recent Labs   Lab Test 11/11/20  1621   PROTTOTAL 7.4   ALBUMIN 3.4   BILITOTAL 0.3   ALKPHOS 100   AST 37   ALT 42     CK - normal    Reviewed thyroid labs.    ASSESSMENT AND PLAN:      DLBCL: dx Sept 2017 with thoracic paravertebral soft tissue mass and mediastinal nodes. overexpression of c-myc and high mitotic index (double-hit like). Staging LP and BM bx negative. S/p DA-R-EPOCH.  She is continuing to see Dr Garima Sauceda for mgmt of her lymphoma.  CT scan last week demonstrated no evidence of disease.    Stage I,  ER/DC-positive, HER2-negative breast cancer: dx in 2011. Oncotype recurrence score of 11 (7% recurrence risk after 5 years of tamoxifen). S/p bilateral mastectomies and BENJIE/BSO. For endocrine therapy she was on arimidex from 7289-1849, then changed to tamoxifen b/c of arthralgias on Arimidex.  She remained on this until mid 2019 at which time we stopped it concerned that she may have myalgias and muscle spasms from it.  GIven there is a proportion of individuals who can have muscle spasms while on tamoxifen, I advised she remain off of any tamoxifen or other endocrine therapy.      She remains off of tamoxifen with no s/s of recurrence.  CT scan negative.    We discussed annual follow up with breast oncology through ten years, and then transitioning to PCP.      Papillary thyroid cancer, it is being followed by Dr. Avelina Castro.  She underwent etoh ablation x3.    She is running a slightly hyperthyroid.  She has follow up arranged with her.    Synchronous thyroid cancer and breast cancer.  She is brca 1 and 2 negative.  Additional testing is negative.        Elevated CK of unclear etiology previously - on repeat evaluation, this has normalized.       Muscle spasms and chronic pain - she is working with [facility].  Encouraged her to continue to follow up with this team for her complex needs.    HCM - all other health care maintenance and primary care through Dr Luna.    LE edema - she complains of LE edema.  It is unclear to me the etiology of this edema.  She has had prior anthracyclines.  Last echo was normal EF.  She has normal liver function.  I reviewed her medications and do not see any potential cause of her LE edema.  Advised she follow up with her PCP.    Shahla Crawley MD

## 2020-11-19 NOTE — PROGRESS NOTES
"Endocrinology video visit progress Note  Emerald Michael, [facility]    Tisha Arias is a 60 year old female who is being evaluated via a billable video visit.      The patient has been notified of following:     \"This video visit will be conducted via a call between you and your physician/provider. We have found that certain health care needs can be provided without the need for an in-person physical exam.  This service lets us provide the care you need with a video conversation.  If a prescription is necessary we can send it directly to your pharmacy.  If lab work is needed we can place an order for that and you can then stop by our lab to have the test done at a later time.    Video visits are billed at different rates depending on your insurance coverage.  Please reach out to your insurance provider with any questions.    If during the course of the call the physician/provider feels a video visit is not appropriate, you will not be charged for this service.\"    Patient has given verbal consent for Video visit? Yes  How would you like to obtain your AVS? MyChart  If you are dropped from the video visit, the video invite should be resent to: Text to cell phone: [phone]  Will anyone else be joining your video visit? No        Video-Visit Details  Type of service:  Video Visit  Video Start Time: 1:46 PM  Video End Time: 2:11 PM  Total 25 minutes  Originating Location (pt. Location): Home  Distant Location (provider location):  [facility] [place]   Platform used for Video Visit: Rylan    Attending ASSESSMENT/PLAN:     1.  Papillary thyroid carcinoma, follicular variant, + ETE (minimal), bilateral, MF, node +.  MACIS is < 6 but TNM is stage III, ANSELMO recurrence risk Intermediate.  She has been treated with total Tx and CND and RRA. She now s/p ETOH treatment to 2  left neck LNs (left level 6 and  left level 3).  Left level 6 LN is gone.    Past Persistent thyroglobulinemia indicates " we still have thyroid tissue.  The levels have been very low and stable/ improving.   She is due for follow up Tg with next labs in 2 months    Addendum:  10/18/2021 Tg 0.48, ANSELMO < 0.4 , TSH 0.26, free T4 1.37, iCa 4.1, Ca 8.1, Mg 2.6, creatinine 0.88, Zn 94.5 ( mcg/dl), B12 286 (193-986), B6 28.2 ( nM), 25OHD 37, PTH 39    3.  Post surgical hypothyroidism. Treat to TSH < 0.4 because of # 1.  She is on  LT4 224 * 7.25/week (mean 232 mcg/day).  Reduce to 224 mcg/day (112 x 2/day).  Repeat labs in late Jan or early Feb, 2022    4.   Hypocalcemia with normal PTH. She has low albumin.   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   We have been unable to get her off calcitriol (0.75 mcg/day) and calcium in the past.    It is possible this is playing a role in the muscle cramps-   Recommend to space out the calcium and Mg more.  24 hour urine calcium    5.  Muscle cramps - this is her primary concern today.  As above.  I doubt the high thyroid levels explain this. Perhaps the low calcium explains it. High CK due to muscle cramping has been described.   Next labs to include B6, Zn, B12    History of gregory en Y gastric bypass is potentially going to affect absorption of the calcium/ vitamin D/ L-T4/ others. I believe this is an important history relative to the hypoparathyoridsim.   It is not fully clear she is replacing expected deficiencies.    Next labs to include B6, Zn, B12    History of Kidney stones with  requirement for stent placement -discussed again.  24 hour urine calcium    Due to the COVID 19 pandemic this visit was a video visit in order to help prevent spread of infection in this high risk patient and the general population. The patient gave verbal consent for the visit today.      Avelina Castro MD      Cc/  HISTORY OF PRESENT  CLARITA Oconnor presents today for semi-urgent  follow up of thyroid cancer and post surgical hypothyroidism. She also has a history of breast caner and lymphoma.  She was last seen by me 11/19.  At that time she was on   LT4  224 x 7.5/week , divided (240 mcg/day) over the course of a week and we reduced to 224 * 7.25/week (mean 232 mcg/day).  She is very concerned about severe muscle cramp symptoms.  Her other doctors have suggested that perhaps it is due to the thyroid blood levels. Review of the record shows she had a very high CK of 3308 on 12/13/19.  At the same time the Ca was 8.6, iCa 4.2, Mg 2.2, creatinine 0.94.  Elvin believes the high CK was due to the muscle cramps.  The muscle cramps are so strong they cause bruising - feel like tendons will snap .  It can be so tight she can't move her  fingers or feet.    The cramps are in the abdomen.  She a door handle size bruise follow a muscle spasm in September The muscle cramps come in waves.   They can last 1.5 hours or longer.  She gets them to calm down with Mg 400 mg tid and 200-400 mg prn .  She had Tuesday night, wed, Thursday.      We have been treating for partial subclinical hypoparathyroidism for years.  She has been on high dose calcitriol (0.75 mcg/day) and she continues on this.  The calcium has been intermittently low with inappropriately low normal PTH. Her current calcium supplement is 625 mg bid plus TUMS 1000 prn.  SHe is also taking supplemental Mg.      Thyroid cancer was discovered in 2013 in the course of getting PET followup for new diagnosis of breast cancer.   Thyroid cancer treatment can be summarized as follows:  7/10/13  total Thyroidectomy and CND  removing bilateral, multifocal papillary thyroid carcinoma, left 1.3 cm (Follicular variant) with minimal extrathyoridal extension and right 0.25 cm (microcarcinoma variant).  2/3 left level 6 LNs were + for PCT (MACIS 5.55, TNM stage III).    9/5/13: Thyrogen stimulated 105 mCi 131I .   Post treatment TBS showed intense neck uptake.      6/11/14 FNAB of left  level 3 node with ? microcalcifications cytology positive for malignancy - papillary Ca, needle wash Tg 6.4 ng/ml.    left level 6 LN  Cytology  benign butneedle wash Tg 1213 ng/ml.   8/18/14 and on 10/24/14 ETOH injection into these  2 lymph nodes.      12/30/14  repeat FNAB of medial to level 6 left level 3 and left level 6 LN .  FNAB of the left level 3 LN and left level 6 LN on 12/30 was read as benign.  Needle wash Tg was < 0.3 (left level 3) and 0.6 (left level 6).    12/30 and 12/31/14  ETOH ablation of left level 6 LN (per the images), though the report states this is left level 3 LN that was ablated.     We have the following tumor marker data  5/2/13: Tg 45, BERYL < 0.4, TSH   SURGERY  9/5/13: Tg 12.6 ng/ml, BERYL < 0.4 ,   I131 105 mCi  10/22/13: Tg 3.7, Beryl < 0.4 U/ml, TSH 0.27  3/7/14: Tg 5.4, BERYL < 0.4, TSH   6/3/14: Tg 1.3, BERYL < 0.4, TSH  Not done; PTH 19  7/18/14 Tg 13.6, BERYL < 0.4, TSH 16.9 Thyrogen stimulated  123I TBS negative. We did not see uptake in the cervical LNs we know are biopsy proven PCT (as is often the case).   ETOH treatments 8/14 and 10/14  11/25/14 Tg 3.5, BERYL < 0.4, TSH 4.95  12/30/14 ETOH treatment  2/17/15: Tg 1.4, BERYL < 0.4, TSH 0.41  5/18/15: Tg 1.3, BERYL < 0.4, TSH 0.42--  12/7/15: Tg 1.3, BERYL < 0.4, TSH 0.12, Zn 79  5/2/16 Tg 1.1 ng/ml , BERYL  < 0.4 U/ml.   11/2/16: Tg 1.4, BERYL < 0.4, TSH 0.45.  -   4/26/17: Tg 1.1, BERYL < 0.4, TSH 0.5  11/2/17  Tg 1.3 , BERYL < 0.4 - - concurrent remeasure 4/16 Tg 0.84.  TSH 0.22  5/21/18: Tg 0.43, Beryl < 0.4, TSH < 0.01, free T4 1.64- .   8/31/18: TSH 0.01, free t4 1.23  11/30/18: 24 hour urine Calcium 230 mg/24 hours  1/29/19: TSH 0.01, free T4 1.19  8/8/19: Tg 0.39, TSH < 0.01, free T4 1.63, Free T3 3.1, BERYL < 20, vitamin D 46, PTH 24, 25OHD 46, B12 485  10/11/19, 1, 25OHD 42.7  10/29/19: Tg 0.26, BERYL < 0.4, TSH < 0.01, free T4 1.57l, Ca 8.6, iCa 4.6, phos 3.6,  creatinine 0.76  11/1/19 kidney stone analysis: 90% calcium oxalate monohydrate ; 10% calcium oxalate dihyrate  12/13/19 TSH 0.01, free T4 1.53, CK 3808, Ca 8.6, iCa 4.2, Mg 2.2, albumin4, creatinine 0.94  1/29/2020 TSH 0.01, free T4 1.35  11/11/2020 TSH 0.06, free T4 1.23, Ca 8.2, creatinine 0.94,     2/27/18 PET /CT: negative per my review of images today; read as minimal uptake right 10th rib bearing paravertebral soft tissue lesion-   9/7/18 CT CAP: increased size of bilateral renal stones.   10/29/19 Neck US  Compared with 11/27/18; 11/2/17 ; 4/26/17 ):    Right level 6 # 1 0.2 x 0.2 x   Right level 7# 1  0.6 x 0.5 x   Left level 3 # 1 (medial to IJ): not seen on stills or cine  11/1/19 CT abdomen [facility]: kidney stone partially obstructing right ureter. This was followed by 11/1/19 right ureteral stone removal and placement of double J ureteral stent    ELDER  still has some visual problem when the muscle cramps are the worst.  She is following uip with the eye doctor.   Muscle cramps are so strong they cause bruising - feel like tendons will snap .  So tight she can't move fingers or feet.  She is seeing pain MD who wonders if related to the LT4.   The muscle cramps come in waves.  She ahd Tuesday night, wed, Thursday.  They can last 1.5 hours or longer.  She gets them to calm down with Mg 400 mg tid and 200-400 mg prn . The cramps are in the abdomen.  She a door handle size bruise follow a muscle spasm in September.    High CK last year was attributed to the muscle spasms.    She is now off tamoxifen since January   nasuea and vomiting daily - with or without food  Had kidney stone surgery x 2 last year.   Edema got bad spring  -- thigh and feet were the same size; had no ankels;  Feet are still very swollen and purplish. :She wears compression stockings.  You can see where thelymph nodes are.      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 No real need for Epipen for this alone.     GDM (gestational diabetes mellitus)     w first pregnancy only     Gestational hypertension      Lymphedema     jackson arms, chest     Migraine      Neuropathy associated with cancer (H)      Papillary carcinoma, follicular variant (H) 2013    pT3, N1, Mx, thyroid     Post-surgical hypothyroidism      Renal disease     kidney stones     Rhinitis, allergic to other allergen      Right Breast mass and microcalcifications 2011     Seasonal allergic conjunctivitis      Seasonal allergic rhinitis 11 skin tests pos. for: dog/M/T/G/W--NEGATIVE FOOD TESTS FOR: shrimp, crab, lobster, coconut     Past Surgical History:   Procedure Laterality Date     BACK SURGERY  ,    Bulging disc w nerve root impigment     BIOPSY BREAST      right     BIOPSY BREAST  11    right, core and sterotactic     BONE MARROW BIOPSY, BONE SPECIMEN, NEEDLE/TROCAR Left 10/3/2017    Procedure: BIOPSY BONE MARROW;  Bone Marrow Biopsy with aspirate;  Surgeon: Amelia Watkins PA-C;  [facility]: [facility]     BYPASS GASTRIC, CHOLECYSTECTOMY, COMBINED       C LAPAROSCOPIC GASTRIC RESTRICTIVE PX, W/GASTRIC BYPASS/ GAAMLIEL-EN-Y, < 150CM  2007    Gamaliel en Y?      SECTION       COLONOSCOPY      normal     COSMETIC SURGERY  2012    Final Stage of Mastectomy     DAVINCI HYSTERECTOMY TOTAL, BILATERAL SALPINGO-OOPHORECTOMY, COMBINED  2012    Procedure: COMBINED DAVINCI HYSTERECTOMY TOTAL, SALPINGO-OOPHORECTOMY;  Davinci Total Laparoscopic Hysterectomy, Bilateral Salpingo Oophorectomy, Pelvic Washings, Cystoscopy;  Surgeon: Evie Sheikh MD;  Location: [facility] OR     ENT SURGERY        ESOPHAGOSCOPY, GASTROSCOPY, DUODENOSCOPY (EGD), COMBINED N/A 9/14/2017    Procedure: COMBINED ENDOSCOPIC ULTRASOUND, ESOPHAGOSCOPY, GASTROSCOPY, DUODENOSCOPY (EGD), FINE NEEDLE ASPIRATE/BIOPSY;  Esophagogastroduodenoscopy, Endoscopic Ultrasound, with fine needle biopsy aspirate;  Surgeon: Patrick Robles MD;  Location: [facility] OR     EXTRACORPOREAL SHOCK WAVE LITHOTRIPSY, CYSTOSCOPY, INSERT STENT URETER(S), COMBINED Right 8/19/2019    Procedure: EXTRACORPORAL SHOCKWAVE LITHOTRIPSY,RIGHT URETERAL STENT PLACEMENT;  Surgeon: Pablo Graham MD;  Location: [facility] OR     GI SURGERY  11-    Archael-en Y w cholecystectomy     GYN SURGERY  1/6/89,1/10/92    2 c-sections     HYSTERECTOMY, PAP NO LONGER INDICATED  12/12    DeVinci assister lap hyst with BSO     INSERT PORT VASCULAR ACCESS Right 10/4/2017    Procedure: INSERT PORT VASCULAR ACCESS;  Port Placement;  Surgeon: Jc Goodman PA-C;  Location: [facility] OR     IR PORT REMOVAL RIGHT  11/11/2019     IRRIGATION AND DEBRIDEMENT BREAST  3/1/2012    Procedure:IRRIGATION AND DEBRIDEMENT BREAST; Irrigation and Debridement, Wound Closure Right Breast; Surgeon:JUNG GUILLEN; Location:[facility] OR     MASTECTOMY, RECONSTRUCT BREAST, COMBINED  1/30/2012    Procedure:COMBINED MASTECTOMY, RECONSTRUCT BREAST; Bilateral Mastectomies, Right Axillary Inman Node Biopsy, Bilateral Breast Reconstruction with Tissue Expanders, Reconstruction of inframammary fold, bilateral pain management systems; Surgeon:ANUPAM CAMPBELL; Location:[facility] OR     RECONSTRUCT BREAST  8/31/2012    Procedure: RECONSTRUCT BREAST;  Bilateral 2nd stage breast reconstruction, revision,       REMOVE PORT VASCULAR ACCESS Right 11/11/2019    Procedure: Right Port Removal;  Surgeon: Ely Leslie PA-C;  Location: [facility]     SOFT TISSUE SURGERY  1-30-12    Mastectomy w severe myofascial pain syndrome     THYROIDECTOMY  7/10/2013    Procedure: THYROIDECTOMY;  Total Thyroidectomy with central neck dissection;   Surgeon: Indiana Sneed MD;  Location: [facility] OR     TONSILLECTOMY      childhood       Medications  Current Outpatient Medications   Medication Sig Dispense Refill     ACAI RAMIREZ PO Take 50 mg by mouth       acetaminophen (TYLENOL) 325 MG tablet Take 2 tablets (650 mg) by mouth every 4 hours as needed for mild pain or fever 100 tablet      albuterol (VENTOLIN HFA) 108 (90 Base) MCG/ACT inhaler Inhale 1-2 puffs into the lungs every 4 hours as needed for shortness of breath / dyspnea or wheezing 18 g 1     aluminum chloride (DRYSOL) 20 % external solution Apply topically At Bedtime 60 mL 3     bisacodyl (DULCOLAX) 5 MG EC tablet Take 3 tablets (15 mg) by mouth daily as needed for constipation 30 tablet 0     calcitRIOL (ROCALTROL) 0.25 MCG capsule 0.5 mcg AM and 0.25 mcg PM. Virtual/video visit requested. Call  to schedule. 270 capsule 3     calcium carbonate antacid (TUMS ULTRA 1000) 1000 MG CHEW Take 1 tablet (1,000 mg) by mouth 4 times daily as needed (severe muscle cramps)       calcium citrate-vitamin D (CALCIUM CITRATE + D3) 315-250 MG-UNIT TABS per tablet Take 2 tablets by mouth 2 times daily 60 tablet 2     celecoxib (CELEBREX) 200 MG capsule Take 1 capsule (200 mg) by mouth 2 times daily 60 capsule 2     cetirizine (ZYRTEC ALLERGY) 10 MG tablet Take 1 tablet (10 mg) by mouth 3 times daily On hold for lab test. 90 tablet 0     Cholecalciferol (VITAMIN D3) 50 MCG (2000 UT) CAPS Take 2,000 Units by mouth daily 90 capsule 1     COMPOUND CONTAINING CONTROLLED SUBSTANCE (CMPD RX) - PHARMACY TO MIX COMPOUNDED MEDICATION Ketamine 6%, Lidocaine 10% in PLO    Apply small amount to area BID  g 0     cromolyn (OPTICROM) 4 % ophthalmic solution Place 1 drop into both eyes 4 times daily 10 mL 0     cromolyn sodium (NASALCROM) 5.2 MG/ACT AERS Inhaler SPRAY ONE SPRAY( 1 ML) IN NOSTRIL DAILY 1 Bottle 6     [employer] FIBER GUMMY BEARS CHILDREN CHEW Take 5 g by mouth daily (2 gummy= 5 g =1 serving) 120 tablet 3      cyclobenzaprine (FLEXERIL) 10 MG tablet Take 1 tablet (10 mg) by mouth 3 times daily as needed for muscle spasms 30 tablet 2     diazepam (VALIUM) 5 MG tablet Take 1 tablet (5 mg) by mouth nightly as needed for anxiety 20 tablet 0     diclofenac (VOLTAREN) 1 % topical gel Apply affected area two times daily as needed using enclosed dosing card. (2-4 grams to chest wall) 100 g 1     fluticasone (FLONASE) 50 MCG/ACT nasal spray Spray 1-2 sprays into both nostrils daily 16 g 2     furosemide (LASIX) 20 MG tablet Take 1 tablet (20 mg) by mouth 2 times daily 90 tablet 3     HYDROcodone-acetaminophen (NORCO)  MG per tablet Take 1 tablet by mouth every 4 (four) hours as needed for pain.  0     ketamine 100 mg/mL (KETALAR) 100 MG/ML intranasal solution 1 spray       KLOR-CON 20 MEQ CR tablet TAKE ONE TABLET BY MOUTH TWICE DAILY 60 tablet 0     levothyroxine (SYNTHROID/LEVOTHROID) 112 MCG tablet Take 2 tablets (224 mcg) by mouth daily 180 tablet 4     lidocaine (XYLOCAINE) 5 % external ointment Apply quarter size amount to chest and back up to 3 times daily as needed for pain. 350 g 1     lidocaine-prilocaine (EMLA) cream Apply topically as needed for moderate pain 60 g 1     magic mouthwash (FIRST-MOUTHWASH BLM) suspension Swish and spit 5-10 mLs in mouth every 6 hours as needed for mouth sores 237 mL 1     magnesium 200 MG TABS Take 100 mg by mouth 3 times daily        MAGNESIUM GLYCINATE PLUS PO Take 400 mg by mouth 3 times daily       Menaquinone-7 (VITAMIN K2) 100 MCG CAPS Take 1 capsule by mouth daily       methocarbamol (ROBAXIN) 750 MG tablet Take 1 tablet (750 mg) by mouth 4 times daily . Ok to take a 5th tablet on very severe days.  3     montelukast (SINGULAIR) 10 MG tablet Take 2 tablets (20 mg) by mouth 2 times daily 120 tablet 10     olopatadine (PATANOL) 0.1 % ophthalmic solution Apply 1 drop to eye       ondansetron (ZOFRAN-ODT) 8 MG ODT tab Take 1 tablet (8 mg) by mouth every 8 hours as needed 30  tablet 3     order for DME Compression socks - knee high 20-30 mmHg zippered if possible 1 Units 11     oxyCODONE IR (ROXICODONE) 10 MG tablet Take 10 mg by mouth every 4 hours as needed       polyethylene glycol (MIRALAX) powder Take 17 g (1 capful) by mouth daily 510 g 0     Probiotic Product (PROBIOTIC DAILY PO) Take 1 capsule by mouth daily Lacto acid bifidobacterium       prochlorperazine (COMPAZINE) 10 MG tablet Take 10 mg by mouth as needed  3     ranitidine (ZANTAC) 75 MG tablet Take 1 tablet (75 mg) by mouth 3 times daily 90 tablet 0     senna-docusate (SENOKOT-S;PERICOLACE) 8.6-50 MG per tablet Take 1-2 tablets by mouth 2 times daily as needed for constipation 60 tablet 0     SUMAtriptan (IMITREX) 50 MG tablet Take 1 tablet by mouth as needed for migraine . May repeat dose in 2 hours as needed. Maximum dose 4 tablets in 24 hours 9 tablet 0     traMADol (ULTRAM) 50 MG tablet Take 1 tablet (50 mg) by mouth every 6 hours as needed for severe pain 30 tablet 0     triamcinolone (KENALOG) 0.025 % ointment Apply topically as needed  3     Triamcinolone Acetonide (AZMACORT IN) Inhale 2 puffs into the lungs as needed       UNABLE TO FIND Take 2 capsules by mouth 3 times daily Muscle Mag. 2 caps contain B1 20mg, B2 20mg, B6 10mg, magesium 20mg, manganese 2mg.       UNABLE TO FIND 3 tablets 3 times daily MEDICATION NAME: calcium D-Glucarate   3 caps contain 180mg of elemental calcium.       UNABLE TO FIND 1 tablet daily MEDICATION NAME: Pure Encapsulations       EPINEPHrine (EPIPEN 2-CARIDAD) 0.3 MG/0.3ML injection 2-pack Inject 0.3 mLs (0.3 mg) into the muscle once as needed for anaphylaxis (Patient not taking: Reported on 11/19/2020) 0.6 mL 3     naloxone (NARCAN) nasal spray Spray 1 spray (4 mg) into one nostril alternating nostrils as needed for opioid reversal every 2-3 minutes until assistance arrives (Patient not taking: Reported on 11/19/2020) 0.2 mL 0     Dilaudid 8 mg every 34 hours  She is not on MS  Ketamine  "trochar every 3 hours  She is currently not using oxycodone.     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  There is no height or weight on file to calculate BMI.   BP Readings from Last 1 Encounters:   11/11/20 (!) 159/84      Pulse Readings from Last 1 Encounters:   11/11/20 87      Resp Readings from Last 1 Encounters:   01/29/20 16      Temp Readings from Last 1 Encounters:   11/11/20 98.3  F (36.8  C) (Oral)      SpO2 Readings from Last 1 Encounters:   11/11/20 99%      Wt Readings from Last 1 Encounters:   11/11/20 103.4 kg (227 lb 14.4 oz)      Ht Readings from Last 1 Encounters:   11/11/20 1.651 m (5' 5\")   GENERAL: baseline appearance in no distress  SKIN: Visible skin clear. No significant rash, abnormal pigmentation or lesions.  Hair is very short (approx 1 cm long)  EYES: Eyes grossly normal to inspection.  No discharge or erythema, or obvious scleral/conjunctival abnormalities.  NECK: no visible masses; no grossly visible goiter  RESP: No audible wheeze, cough, or visible cyanosis.  No visible retractions or increased work of breathing.    NEURO: Awake, alert, responds appropriately to questions.  Mentation and speech fluent.  PSYCH:affect normal, judgement and insight intact, and appearance well-groomed.    DATA REVIEW    ENDO THYROID LABS-[facility] Latest Ref Rng & Units 11/11/2020 1/29/2020   TSH 0.40 - 4.00 mU/L 0.06 (L) 0.01 (L)   T4 FREE 0.76 - 1.46 ng/dL 1.23 1.35   FREE T3 2.3 - 4.2 pg/mL     THYROGLOBULIN ANTIBODY <40 IU/mL       ENDO THYROID LABS-[facility] Latest Ref Rng & Units 12/13/2019 10/29/2019   TSH 0.40 - 4.00 mU/L 0.01 (L) <0.01 (L)   T4 FREE 0.76 - " 1.46 ng/dL 1.53 (H) 1.57 (H)   FREE T3 2.3 - 4.2 pg/mL     THYROGLOBULIN ANTIBODY <40 IU/mL       ENDO THYROID LABS-[facility] Latest Ref Rng & Units 8/8/2019   TSH 0.40 - 4.00 mU/L <0.01 (L)   T4 FREE 0.76 - 1.46 ng/dL 1.61 (H)   FREE T3 2.3 - 4.2 pg/mL 3.1   THYROGLOBULIN ANTIBODY <40 IU/mL <20     ENDO CALCIUM LABS-[facility] Latest Ref Rng & Units 11/11/2020   CALCIUM 8.5 - 10.1 mg/dL 8.2 (L)   CALCIUM IONIZED 4.4 - 5.2 mg/dL    CALCIUM IONIZED WHOLE BLOOD 4.4 - 5.2 mg/dL    PHOSPHOROUS 2.5 - 4.5 mg/dL    MAGNESIUM 1.6 - 2.3 mg/dL    ALBUMIN 3.4 - 5.0 g/dL 3.4   BUN 7 - 30 mg/dL 16   CREATININE 0.52 - 1.04 mg/dL 0.94   CREATININE 0.52 - 1.04 mg/dL    PARATHYROID HORMONE INTACT 18 - 80 pg/mL    ALKPHOS 40 - 150 U/L 100   25 OH VIT D2 ug/L    25 OH VIT D3 ug/L    25 OH VIT D TOTAL 20 - 75 ug/L    VITAMIN D DEFICIENCY SCREENING 20 - 75 ug/L    PROTEIN, TOTAL 6.8 - 8.8 g/dL 7.4   CALCIUM URINE G/24 H 0.10 - 0.30 g/24 h    CALCIUM URINE G/G CR g/g Cr    CALCIUM URINE MG/DL mg/dL    CK TOTAL 30 - 225 U/L 107     ENDO CALCIUM LABS-[facility] Latest Ref Rng & Units 5/5/2020 2/6/2020   CALCIUM 8.5 - 10.1 mg/dL 8.3 (L)    CALCIUM IONIZED 4.4 - 5.2 mg/dL     CALCIUM IONIZED WHOLE BLOOD 4.4 - 5.2 mg/dL     PHOSPHOROUS 2.5 - 4.5 mg/dL     MAGNESIUM 1.6 - 2.3 mg/dL     ALBUMIN 3.4 - 5.0 g/dL 3.5    BUN 7 - 30 mg/dL 13    CREATININE 0.52 - 1.04 mg/dL 0.90    CREATININE 0.52 - 1.04 mg/dL  1.3 (H)   PARATHYROID HORMONE INTACT 18 - 80 pg/mL     ALKPHOS 40 - 150 U/L 90    25 OH VIT D2 ug/L     25 OH VIT D3 ug/L     25 OH VIT D TOTAL 20 - 75 ug/L     VITAMIN D DEFICIENCY SCREENING 20 - 75 ug/L     PROTEIN, TOTAL 6.8 - 8.8 g/dL 7.0    CALCIUM URINE G/24 H 0.10 - 0.30 g/24 h     CALCIUM URINE G/G CR g/g Cr     CALCIUM URINE MG/DL mg/dL     CK TOTAL 30 - 225 U/L 81      ENDO CALCIUM LABS-[facility] Latest Ref Rng & Units 1/29/2020 1/6/2020   CALCIUM 8.5 - 10.1 mg/dL 8.7 9.1   CALCIUM IONIZED 4.4 - 5.2 mg/dL     CALCIUM IONIZED WHOLE BLOOD 4.4 - 5.2 mg/dL      PHOSPHOROUS 2.5 - 4.5 mg/dL     MAGNESIUM 1.6 - 2.3 mg/dL     ALBUMIN 3.4 - 5.0 g/dL 3.5 3.8   BUN 7 - 30 mg/dL 18 19   CREATININE 0.52 - 1.04 mg/dL 1.25 (H) 1.06 (H)   CREATININE 0.52 - 1.04 mg/dL     PARATHYROID HORMONE INTACT 18 - 80 pg/mL     ALKPHOS 40 - 150 U/L 92 105   25 OH VIT D2 ug/L     25 OH VIT D3 ug/L     25 OH VIT D TOTAL 20 - 75 ug/L     VITAMIN D DEFICIENCY SCREENING 20 - 75 ug/L     PROTEIN, TOTAL 6.8 - 8.8 g/dL 6.9 7.8   CALCIUM URINE G/24 H 0.10 - 0.30 g/24 h     CALCIUM URINE G/G CR g/g Cr     CALCIUM URINE MG/DL mg/dL     CK TOTAL 30 - 225 U/L 49 83     ENDO CALCIUM LABS-[facility] Latest Ref Rng & Units 12/15/2019   CALCIUM 8.5 - 10.1 mg/dL    CALCIUM IONIZED 4.4 - 5.2 mg/dL    CALCIUM IONIZED WHOLE BLOOD 4.4 - 5.2 mg/dL    PHOSPHOROUS 2.5 - 4.5 mg/dL    MAGNESIUM 1.6 - 2.3 mg/dL    ALBUMIN 3.4 - 5.0 g/dL    BUN 7 - 30 mg/dL    CREATININE 0.52 - 1.04 mg/dL    CREATININE 0.52 - 1.04 mg/dL    PARATHYROID HORMONE INTACT 18 - 80 pg/mL    ALKPHOS 40 - 150 U/L    25 OH VIT D2 ug/L    25 OH VIT D3 ug/L    25 OH VIT D TOTAL 20 - 75 ug/L    VITAMIN D DEFICIENCY SCREENING 20 - 75 ug/L    PROTEIN, TOTAL 6.8 - 8.8 g/dL    CALCIUM URINE G/24 H 0.10 - 0.30 g/24 h    CALCIUM URINE G/G CR g/g Cr    CALCIUM URINE MG/DL mg/dL    CK TOTAL 30 - 225 U/L 1,694 (HH)     ENDO CALCIUM LABS-[facility] Latest Ref Rng & Units 12/13/2019   CALCIUM 8.5 - 10.1 mg/dL 8.6   CALCIUM IONIZED 4.4 - 5.2 mg/dL 4.2 (L)   CALCIUM IONIZED WHOLE BLOOD 4.4 - 5.2 mg/dL    PHOSPHOROUS 2.5 - 4.5 mg/dL    MAGNESIUM 1.6 - 2.3 mg/dL 2.2   ALBUMIN 3.4 - 5.0 g/dL 4.0   BUN 7 - 30 mg/dL 15   CREATININE 0.52 - 1.04 mg/dL 0.94   CREATININE 0.52 - 1.04 mg/dL    PARATHYROID HORMONE INTACT 18 - 80 pg/mL    ALKPHOS 40 - 150 U/L 102   25 OH VIT D2 ug/L    25 OH VIT D3 ug/L    25 OH VIT D TOTAL 20 - 75 ug/L    VITAMIN D DEFICIENCY SCREENING 20 - 75 ug/L    PROTEIN, TOTAL 6.8 - 8.8 g/dL 7.7   CALCIUM URINE G/24 H 0.10 - 0.30 g/24 h    CALCIUM URINE G/G CR g/g Cr     CALCIUM URINE MG/DL mg/dL    CK TOTAL 30 - 225 U/L 3,308 ()

## 2020-11-19 NOTE — LETTER
"    11/19/2020         RE: Tisha Arias  [address] [place] [address]        Dear Colleague,    Thank you for referring your patient, Tisha Arias, to the [facility]. Please see a copy of my visit note below.    Tisha Arias is a 60 year old female who is being evaluated via a billable video visit.      The patient has been notified of following:     \"This video visit will be conducted via a call between you and your physician/provider. We have found that certain health care needs can be provided without the need for an in-person physical exam.  This service lets us provide the care you need with a video conversation.  If a prescription is necessary we can send it directly to your pharmacy.  If lab work is needed we can place an order for that and you can then stop by our lab to have the test done at a later time.    Video visits are billed at different rates depending on your insurance coverage.  Please reach out to your insurance provider with any questions.    If during the course of the call the physician/provider feels a video visit is not appropriate, you will not be charged for this service.\"    Patient has given verbal consent for Video visit? Yes  How would you like to obtain your AVS? Mail a copy  If you are dropped from the video visit, the video invite should be resent to: Text to cell phone: [phone]  Will anyone else be joining your video visit? No      Vitals - Patient Reported  Weight (Patient Reported): 102.1 kg (225 lb)  Height (Patient Reported): 165.1 cm (5' 5\")  BMI (Based on Pt Reported Ht/Wt): 37.44  Pain Score: Severe Pain (7)    Leilani Camarena, INCHOLE November 19, 2020  10:20 AM       Video-Visit Details    Type of service:  Video Visit    Video Time: 20 min  Originating Location (pt. Location): Home    Distant Location (provider location):  [facility]     Platform used for Video Visit: " Rylan Crawley MD    November 19, 2020    REASON FOR VISIT:  F/u breast cancer and thyroid cancer and lymphoma     HISTORY OF PRESENT ILLNESS:  Tisha Arias is a 60 year-old woman, postmenopausal, with a history of T1c N0 M0, infiltrating ductal carcinoma of the right breast with a strong family history of breast cancer as well as thyroid cancer and a history of DLBCL. BRCA1 and 2 gene testing negative.    Cancer Treatment History for her breast cancer and DLBCL:  -She was diagnosed with breast cancer in 12/2011.    -She underwent bilateral mastectomy with immediate reconstruction on 01/30/2012 by Dr. Daugherty.  Her final pathology revealed 1.6-cm, infiltrating ductal carcinoma, grade 1/3, no angiolymphatic space invasion, no nipple or skin invasion.  There was associated DCIS, and the margins were free of tumor superiorly, anterior margin by 0.1 cm, and widely free at remaining margins.  This was ER/AZ-positive, HER2-negative in the vast majority of cells and non-amplified with a ratio of 1.1.  She had an Oncotype test performed, which revealed a low-risk score of 11.  That put her overall risk of recurrence at about 7% after 5 years of tamoxifen.    - in 02/2012, she was started on Arimidex.    - 12/2012, she had prophylactic hysterectomy and oophorectomy, the pathology of which was benign.    - Her course has been complicated by extreme chest wall pain, myofascial pain, and neuropathic pain.  She has gone through several different clinics and was put on several different medications, which were not doing her any good.  Eventually she was weaned off all the medications and is currently doing only PT with myofascial pain maneuvers with symptomatic improvement.   - 2/2014 switched from arimidex to tamoxifen  - presented to the ER on 9/1/17 for chest pain. CT-PA was negative for PE but showed right 3cm paraspinal mass and subcarinal adenopathy. PET on 9/6/17 showed the paraspinal mass to be  hypermetabolic along with hilar and mediastinal nodes.   - biopsy of T10 vertebral body on 9/15/17 showed high-grade B cell lymphoma   - FNA EUS of subcarinal LN on 9/14 showed mostly necrotic tissue with suspicion for malignancy   - pt received DA-EPOCH under the care of Dr Garima Sauceda    Thyroid cancer history: Incidentally, given that she was having so many symptoms, she underwent a PET scan in 4/2013, which revealed a thyroid nodule which was biopsied and was consistent with papillary carcinoma.  She has undergone resection as well as radioiodine treatment since and has done relatively well from the surgery.  She follows up with Dr. Castro for the same.  She unfortunately required etoh ablation therapy over the thyroid lymph nodes.   She remains under the care of Dr Avelina Castro.       INTERVAL HISTORY: Elvin comes in today for follow up.  She is no longer on tamoxifen.      She reports having a difficult year.  She continues to have intense muscle spasms.  She developed them again last night.  She had intense spasms across her thighs and lower abdomen.  When she gets these, she takes magnesium, but it didn't help.  After 30 minutes, and ice packs, it seemed to anu.      No nsaids.   No travel.  No rash.  No nausea.  No vomiting.  No diarrhea.        She is not having fevers.  She is still having hot flashes.      She is using flexeril and voltaren gel for her pain.   She is using ketamine trochars. She is being seen by Dr Berry in  for these medications.       She has gained about 30 pounds this past year.  She thinks a lot of this is from edema in her lower extremities.  She has questions about this and wonders what this is from, who to go to regarding these symptoms.  She is finding it difficult navigating multiple providers, and feeling like no one is really taking charge of her health.      Her 10-point review of systems is otherwise negative.     MEDICATIONS:  reviewed     PHYSICAL  EXAMINATION:    VITAL SIGNS:There were no vitals taken for this visit.   GENERAL: Healthy, alert and no distress  EYES: Eyes grossly normal to inspection.  No discharge or erythema, or obvious scleral/conjunctival abnormalities.  RESP: No audible wheeze, cough, or visible cyanosis.  No visible retractions or increased work of breathing.    SKIN: Visible skin clear. No significant rash, abnormal pigmentation or lesions.  NEURO: Cranial nerves grossly intact.  Mentation and speech appropriate for age.  PSYCH: Mentation appears normal, affect normal/bright, judgement and insight intact, normal speech and appearance well-groomed.     Labs:   CT CAP -   IMPRESSION:  1.  Mild splenomegaly with the spleen measuring 13.7 cm, previously  12.7 cm.  2.  No other evidence for recurrent or metastatic disease in the  chest, abdomen and pelvis.  3.  Stable biliary dilatation.    Lab Results   Component Value Date    WBC 4.3 11/11/2020     Lab Results   Component Value Date    RBC 4.15 11/11/2020     Lab Results   Component Value Date    HGB 12.5 11/11/2020     Lab Results   Component Value Date    HCT 38.3 11/11/2020     No components found for: MCT  Lab Results   Component Value Date    MCV 92 11/11/2020     Lab Results   Component Value Date    MCH 30.1 11/11/2020     Lab Results   Component Value Date    MCHC 32.6 11/11/2020     Lab Results   Component Value Date    RDW 11.9 11/11/2020     Lab Results   Component Value Date     11/11/2020       Recent Labs   Lab Test 11/11/20  1621 05/05/20  1408    140   POTASSIUM 4.2 4.0   CHLORIDE 106 106   CO2 31 28   ANIONGAP 4 6   * 97   BUN 16 13   CR 0.94 0.90   ELMO 8.2* 8.3*     Liver Function Studies -   Recent Labs   Lab Test 11/11/20  1621   PROTTOTAL 7.4   ALBUMIN 3.4   BILITOTAL 0.3   ALKPHOS 100   AST 37   ALT 42     CK - normal    Reviewed thyroid labs.    ASSESSMENT AND PLAN:      DLBCL: dx Sept 2017 with thoracic paravertebral soft tissue mass and  mediastinal nodes. overexpression of c-myc and high mitotic index (double-hit like). Staging LP and BM bx negative. S/p DA-R-EPOCH.  She is continuing to see Dr Garima Sauceda for mgmt of her lymphoma.  CT scan last week demonstrated no evidence of disease.    Stage I, ER/MT-positive, HER2-negative breast cancer: dx in 2011. Oncotype recurrence score of 11 (7% recurrence risk after 5 years of tamoxifen). S/p bilateral mastectomies and BENJIE/BSO. For endocrine therapy she was on arimidex from 4785-6053, then changed to tamoxifen b/c of arthralgias on Arimidex.  She remained on this until mid 2019 at which time we stopped it concerned that she may have myalgias and muscle spasms from it.  GIven there is a proportion of individuals who can have muscle spasms while on tamoxifen, I advised she remain off of any tamoxifen or other endocrine therapy.      She remains off of tamoxifen with no s/s of recurrence.  CT scan negative.    We discussed annual follow up with breast oncology through ten years, and then transitioning to PCP.      Papillary thyroid cancer, it is being followed by Dr. Avelina Castro.  She underwent etoh ablation x3.    She is running a slightly hyperthyroid.  She has follow up arranged with her.    Synchronous thyroid cancer and breast cancer.  She is brca 1 and 2 negative.  Additional testing is negative.        Elevated CK of unclear etiology previously - on repeat evaluation, this has normalized.       Muscle spasms and chronic pain - she is working with [facility].  Encouraged her to continue to follow up with this team for her complex needs.    HCM - all other health care maintenance and primary care through Dr Luna.    LE edema - she complains of LE edema.  It is unclear to me the etiology of this edema.  She has had prior anthracyclines.  Last echo was normal EF.  She has normal liver function.  I reviewed her medications and do not see any potential cause of her LE edema.  Advised she  follow up with her PCP.    Shahla Crawley MD

## 2020-11-19 NOTE — TELEPHONE ENCOUNTER
[facility] Call Center    Phone Message    May a detailed message be left on voicemail: yes     Reason for Call: Other: Pt called stating she is having symptoms related to levothyroxine (SYNTHROID/LEVOTHROID) 112 MCG tablet. Pt stated she is on a high dose and is requesting to speak to Dr. Castro specifically. Pt gave no other information. Please follow up     Action Taken: Message routed to:  [facility] ([facility] Endo    Travel Screening: Not Applicable

## 2020-11-20 ENCOUNTER — VIRTUAL VISIT (OUTPATIENT)
Dept: ENDOCRINOLOGY | Facility: CLINIC | Age: 60
End: 2020-11-20
Payer: COMMERCIAL

## 2020-11-20 DIAGNOSIS — C73 PAPILLARY CARCINOMA, FOLLICULAR VARIANT (H): ICD-10-CM

## 2020-11-20 DIAGNOSIS — E89.0 POSTSURGICAL HYPOTHYROIDISM: ICD-10-CM

## 2020-11-20 DIAGNOSIS — E83.51 HYPOCALCEMIA: Primary | ICD-10-CM

## 2020-11-20 DIAGNOSIS — E89.2 POSTSURGICAL HYPOPARATHYROIDISM (H): ICD-10-CM

## 2020-11-20 DIAGNOSIS — Z98.84 HISTORY OF GASTRIC BYPASS: ICD-10-CM

## 2020-11-20 DIAGNOSIS — R25.2 MUSCLE CRAMPS: ICD-10-CM

## 2020-11-20 PROCEDURE — 99214 OFFICE O/P EST MOD 30 MIN: CPT | Mod: 95

## 2020-11-20 RX ORDER — LEVOTHYROXINE SODIUM 112 UG/1
224 TABLET ORAL DAILY
Qty: 180 TABLET | Refills: 4 | Status: SHIPPED | OUTPATIENT
Start: 2020-11-20 | End: 2021-11-23

## 2020-11-20 NOTE — LETTER
"11/20/2020       RE: Tisha Arias  [address] [place] [address]     Dear Colleague,    Thank you for referring your patient, Tisha Arias, to the [facility] [place] at [facility]. Please see a copy of my visit note below.    Endocrinology video visit progress Note  Emerald Michael, NICHOLE    Tisha Arias is a 60 year old female who is being evaluated via a billable video visit.      The patient has been notified of following:     \"This video visit will be conducted via a call between you and your physician/provider. We have found that certain health care needs can be provided without the need for an in-person physical exam.  This service lets us provide the care you need with a video conversation.  If a prescription is necessary we can send it directly to your pharmacy.  If lab work is needed we can place an order for that and you can then stop by our lab to have the test done at a later time.    Video visits are billed at different rates depending on your insurance coverage.  Please reach out to your insurance provider with any questions.    If during the course of the call the physician/provider feels a video visit is not appropriate, you will not be charged for this service.\"    Patient has given verbal consent for Video visit? Yes  How would you like to obtain your AVS? MyChart  If you are dropped from the video visit, the video invite should be resent to: Text to cell phone: [phone]  Will anyone else be joining your video visit? No        Video-Visit Details  Type of service:  Video Visit  Video Start Time: 1:46 PM  Video End Time: 2:11 PM  Total 25 minutes  Originating Location (pt. Location): Home  Distant Location (provider location):  [facility] [place]   Platform used for Video Visit: Rylan    Attending ASSESSMENT/PLAN:     1.  Papillary thyroid carcinoma, follicular " variant, + ETE (minimal), bilateral, MF, node +.  MACIS is < 6 but TNM is stage III, ANSELMO recurrence risk Intermediate.  She has been treated with total Tx and CND and RRA. She now s/p ETOH treatment to 2  left neck LNs (left level 6 and  left level 3).  Left level 6 LN is gone.    Past Persistent thyroglobulinemia indicates we still have thyroid tissue.  The levels have been very low and stable/ improving.   She is due for follow up Tg with next labs in 2 months    3.  Post surgical hypothyroidism. Treat to TSH < 0.4 because of # 1.  She is on  LT4 224 * 7.25/week (mean 232 mcg/day).  Reduce to 224 mcg/day (112 x 2/day).  Repeat labs in late Jan or early Feb, 2022    4.   Hypocalcemia with normal PTH. She has low albumin.   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   We have been unable to get her off calcitriol (0.75 mcg/day) and calcium in the past.    It is possible this is playing a role in the muscle cramps-   Recommend to space out the calcium and Mg more.  24 hour urine calcium    5.  Muscle cramps - this is her primary concern today.  As above.  I doubt the high thyroid levels explain this. Perhaps the low calcium explains it. High CK due to muscle cramping has been described.   Next labs to include B6, Zn, B12    History of gregory en Y gastric bypass is potentially going to affect absorption of the calcium/ vitamin D/ L-T4/ others. I believe this is an important history relative to the hypoparathyoridsim.   It is not fully clear she is replacing expected deficiencies.    Next labs to include B6, Zn, B12    History of Kidney stones with  requirement for stent placement -discussed again.  24 hour urine calcium    Due to the COVID 19 pandemic this visit was a video visit in order to help prevent spread of infection in this high risk patient and the  general population. The patient gave verbal consent for the visit today.      Avelina Castro MD      Cc/  HISTORY OF PRESENT ILLNESS Elvin presents today for semi-urgent  follow up of thyroid cancer and post surgical hypothyroidism. She also has a history of breast caner and lymphoma.  She was last seen by me 11/19.  At that time she was on   LT4  224 x 7.5/week , divided (240 mcg/day) over the course of a week and we reduced to 224 * 7.25/week (mean 232 mcg/day).  She is very concerned about severe muscle cramp symptoms.  Her other doctors have suggested that perhaps it is due to the thyroid blood levels. Review of the record shows she had a very high CK of 3308 on 12/13/19.  At the same time the Ca was 8.6, iCa 4.2, Mg 2.2, creatinine 0.94.  Elvin believes the high CK was due to the muscle cramps.  The muscle cramps are so strong they cause bruising - feel like tendons will snap .  It can be so tight she can't move her  fingers or feet.    The cramps are in the abdomen.  She a door handle size bruise follow a muscle spasm in September The muscle cramps come in waves.   They can last 1.5 hours or longer.  She gets them to calm down with Mg 400 mg tid and 200-400 mg prn .  She had Tuesday night, wed, Thursday.      We have been treating for partial subclinical hypoparathyroidism for years.  She has been on high dose calcitriol (0.75 mcg/day) and she continues on this.  The calcium has been intermittently low with inappropriately low normal PTH. Her current calcium supplement is 625 mg bid plus TUMS 1000 prn.  SHe is also taking supplemental Mg.      Thyroid cancer was discovered in 2013 in the course of getting PET followup for new diagnosis of breast cancer.   Thyroid cancer treatment can be summarized as follows:  7/10/13  total Thyroidectomy and CND  removing bilateral, multifocal papillary thyroid carcinoma, left 1.3 cm (Follicular variant) with minimal extrathyoridal extension and right 0.25 cm  (microcarcinoma variant).  2/3 left level 6 LNs were + for PCT (MACIS 5.55, TNM stage III).    9/5/13: Thyrogen stimulated 105 mCi 131I .  Post treatment TBS showed intense neck uptake.      6/11/14 FNAB of left  level 3 node with ? microcalcifications cytology positive for malignancy - papillary Ca, needle wash Tg 6.4 ng/ml.    left level 6 LN  Cytology  benign butneedle wash Tg 1213 ng/ml.   8/18/14 and on 10/24/14 ETOH injection into these  2 lymph nodes.      12/30/14  repeat FNAB of medial to level 6 left level 3 and left level 6 LN .  FNAB of the left level 3 LN and left level 6 LN on 12/30 was read as benign.  Needle wash Tg was < 0.3 (left level 3) and 0.6 (left level 6).    12/30 and 12/31/14  ETOH ablation of left level 6 LN (per the images), though the report states this is left level 3 LN that was ablated.     We have the following tumor marker data  5/2/13: Tg 45, BERYL < 0.4, TSH   SURGERY  9/5/13: Tg 12.6 ng/ml, BERYL < 0.4 ,   I131 105 mCi  10/22/13: Tg 3.7, Beryl < 0.4 U/ml, TSH 0.27  3/7/14: Tg 5.4, BERYL < 0.4, TSH   6/3/14: Tg 1.3, BERYL < 0.4, TSH  Not done; PTH 19  7/18/14 Tg 13.6, BERYL < 0.4, TSH 16.9 Thyrogen stimulated  123I TBS negative. We did not see uptake in the cervical LNs we know are biopsy proven PCT (as is often the case).   ETOH treatments 8/14 and 10/14  11/25/14 Tg 3.5, BERYL < 0.4, TSH 4.95  12/30/14 ETOH treatment  2/17/15: Tg 1.4, BERYL < 0.4, TSH 0.41  5/18/15: Tg 1.3, BERYL < 0.4, TSH 0.42--  12/7/15: Tg 1.3, BERYL < 0.4, TSH 0.12, Zn 79  5/2/16 Tg 1.1 ng/ml , BERYL  < 0.4 U/ml.   11/2/16: Tg 1.4, BERYL < 0.4, TSH 0.45.  -   4/26/17: Tg 1.1, BERYL < 0.4, TSH 0.5  11/2/17  Tg 1.3 , BERYL < 0.4 - - concurrent remeasure 4/16 Tg 0.84.  TSH 0.22  5/21/18: Tg 0.43, Beryl < 0.4, TSH < 0.01, free T4 1.64- .   8/31/18: TSH 0.01, free t4 1.23  11/30/18: 24 hour urine Calcium 230 mg/24 hours  1/29/19: TSH 0.01, free T4 1.19  8/8/19: Tg 0.39, TSH < 0.01, free T4 1.63, Free T3 3.1, BERYL < 20, vitamin D 46,  PTH 24, 25OHD 46, B12 485  10/11/19, 1, 25OHD 42.7  10/29/19: Tg 0.26, ANSELMO < 0.4, TSH < 0.01, free T4 1.57l, Ca 8.6, iCa 4.6, phos 3.6, creatinine 0.76  11/1/19 kidney stone analysis: 90% calcium oxalate monohydrate ; 10% calcium oxalate dihyrate  12/13/19 TSH 0.01, free T4 1.53, CK 3808, Ca 8.6, iCa 4.2, Mg 2.2, albumin4, creatinine 0.94  1/29/2020 TSH 0.01, free T4 1.35  11/11/2020 TSH 0.06, free T4 1.23, Ca 8.2, creatinine 0.94,     2/27/18 PET /CT: negative per my review of images today; read as minimal uptake right 10th rib bearing paravertebral soft tissue lesion-   9/7/18 CT CAP: increased size of bilateral renal stones.   10/29/19 Neck US  Compared with 11/27/18; 11/2/17 ; 4/26/17 ):    Right level 6 # 1 0.2 x 0.2 x   Right level 7# 1  0.6 x 0.5 x   Left level 3 # 1 (medial to IJ): not seen on stills or cine  11/1/19 CT abdomen [facility]: kidney stone partially obstructing right ureter. This was followed by 11/1/19 right ureteral stone removal and placement of double J ureteral stent    ROS  still has some visual problem when the muscle cramps are the worst.  She is following uip with the eye doctor.   Muscle cramps are so strong they cause bruising - feel like tendons will snap .  So tight she can't move fingers or feet.  She is seeing pain MD who wonders if related to the LT4.   The muscle cramps come in waves.  She ahd Tuesday night, wed, Thursday.  They can last 1.5 hours or longer.  She gets them to calm down with Mg 400 mg tid and 200-400 mg prn . The cramps are in the abdomen.  She a door handle size bruise follow a muscle spasm in September.    High CK last year was attributed to the muscle spasms.    She is now off tamoxifen since January   nasuea and vomiting daily - with or without food  Had kidney stone surgery x 2 last year.   Edema got bad spring  -- thigh and feet were the same size; had no ankels;  Feet are still very swollen and purplish. :She wears compression stockings.  You can  see where thelymph nodes are.      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 No real need for Epipen for this alone.     GDM (gestational diabetes mellitus)     w first pregnancy only     Gestational hypertension      Lymphedema     jackson arms, chest     Migraine      Neuropathy associated with cancer (H)      Papillary carcinoma, follicular variant (H) 2013    pT3, N1, Mx, thyroid     Post-surgical hypothyroidism      Renal disease     kidney stones     Rhinitis, allergic to other allergen      Right Breast mass and microcalcifications 2011     Seasonal allergic conjunctivitis      Seasonal allergic rhinitis 11 skin tests pos. for: dog/M/T/G/W--NEGATIVE FOOD TESTS FOR: shrimp, crab, lobster, coconut     Past Surgical History:   Procedure Laterality Date     BACK SURGERY  ,    Bulging disc w nerve root impigment     BIOPSY BREAST      right     BIOPSY BREAST  11    right, core and sterotactic     BONE MARROW BIOPSY, BONE SPECIMEN, NEEDLE/TROCAR Left 10/3/2017    Procedure: BIOPSY BONE MARROW;  Bone Marrow Biopsy with aspirate;  Surgeon: Amelia Watkins PA-C;  Location: [facility] OR     BYPASS GASTRIC, CHOLECYSTECTOMY, COMBINED       C LAPAROSCOPIC GASTRIC RESTRICTIVE PX, W/GASTRIC BYPASS/ GAMALIEL-EN-Y, < 150CM      Gamaliel en Y?      SECTION       COLONOSCOPY      normal     COSMETIC SURGERY  2012    Final Stage of Mastectomy     DAVINCI HYSTERECTOMY TOTAL, BILATERAL SALPINGO-OOPHORECTOMY, COMBINED  2012    Procedure: COMBINED DAVINCI HYSTERECTOMY TOTAL, SALPINGO-OOPHORECTOMY;  Davinci Total Laparoscopic Hysterectomy, Bilateral  Salpingo Oophorectomy, Pelvic Washings, Cystoscopy;  Surgeon: Evie Sheikh MD;  Location: [facility] OR     ENT SURGERY  1982     ESOPHAGOSCOPY, GASTROSCOPY, DUODENOSCOPY (EGD), COMBINED N/A 9/14/2017    Procedure: COMBINED ENDOSCOPIC ULTRASOUND, ESOPHAGOSCOPY, GASTROSCOPY, DUODENOSCOPY (EGD), FINE NEEDLE ASPIRATE/BIOPSY;  Esophagogastroduodenoscopy, Endoscopic Ultrasound, with fine needle biopsy aspirate;  Surgeon: Patrick Robles MD;  Location: [facility] OR     EXTRACORPOREAL SHOCK WAVE LITHOTRIPSY, CYSTOSCOPY, INSERT STENT URETER(S), COMBINED Right 8/19/2019    Procedure: EXTRACORPORAL SHOCKWAVE LITHOTRIPSY,RIGHT URETERAL STENT PLACEMENT;  Surgeon: Pablo Graham MD;  Location:  OR     GI SURGERY  11-    Rachael-en Y w cholecystectomy     GYN SURGERY  1/6/89,1/10/92    2 c-sections     HYSTERECTOMY, PAP NO LONGER INDICATED  12/12    DeVinci assister lap hyst with BSO     INSERT PORT VASCULAR ACCESS Right 10/4/2017    Procedure: INSERT PORT VASCULAR ACCESS;  Port Placement;  Surgeon: Jc Goodman PA-C;  Location:  OR     IR PORT REMOVAL RIGHT  11/11/2019     IRRIGATION AND DEBRIDEMENT BREAST  3/1/2012    Procedure:IRRIGATION AND DEBRIDEMENT BREAST; Irrigation and Debridement, Wound Closure Right Breast; Surgeon:JUNG GUILLEN; Location:[facility] OR     MASTECTOMY, RECONSTRUCT BREAST, COMBINED  1/30/2012    Procedure:COMBINED MASTECTOMY, RECONSTRUCT BREAST; Bilateral Mastectomies, Right Axillary [place] Node Biopsy, Bilateral Breast Reconstruction with Tissue Expanders, Reconstruction of inframammary fold, bilateral pain management systems; Surgeon:ANUPAM CAMPBELL; Location:[facility] OR     RECONSTRUCT BREAST  8/31/2012    Procedure: RECONSTRUCT BREAST;  Bilateral 2nd stage breast reconstruction, revision,       REMOVE PORT VASCULAR ACCESS Right 11/11/2019    Procedure: Right Port Removal;  Surgeon: Ely Leslie PA-C;  Location:  OR     SOFT TISSUE SURGERY  1-30-12    Mastectomy w severe  myofascial pain syndrome     THYROIDECTOMY  7/10/2013    Procedure: THYROIDECTOMY;  Total Thyroidectomy with central neck dissection;  Surgeon: Indiana Sneed MD;  Location: [facility] OR     TONSILLECTOMY      childhood       Medications  Current Outpatient Medications   Medication Sig Dispense Refill     BRIANA RAMIREZ PO Take 50 mg by mouth       acetaminophen (TYLENOL) 325 MG tablet Take 2 tablets (650 mg) by mouth every 4 hours as needed for mild pain or fever 100 tablet      albuterol (VENTOLIN HFA) 108 (90 Base) MCG/ACT inhaler Inhale 1-2 puffs into the lungs every 4 hours as needed for shortness of breath / dyspnea or wheezing 18 g 1     aluminum chloride (DRYSOL) 20 % external solution Apply topically At Bedtime 60 mL 3     bisacodyl (DULCOLAX) 5 MG EC tablet Take 3 tablets (15 mg) by mouth daily as needed for constipation 30 tablet 0     calcitRIOL (ROCALTROL) 0.25 MCG capsule 0.5 mcg AM and 0.25 mcg PM. Virtual/video visit requested. Call  to schedule. 270 capsule 3     calcium carbonate antacid (TUMS ULTRA 1000) 1000 MG CHEW Take 1 tablet (1,000 mg) by mouth 4 times daily as needed (severe muscle cramps)       calcium citrate-vitamin D (CALCIUM CITRATE + D3) 315-250 MG-UNIT TABS per tablet Take 2 tablets by mouth 2 times daily 60 tablet 2     celecoxib (CELEBREX) 200 MG capsule Take 1 capsule (200 mg) by mouth 2 times daily 60 capsule 2     cetirizine (ZYRTEC ALLERGY) 10 MG tablet Take 1 tablet (10 mg) by mouth 3 times daily On hold for lab test. 90 tablet 0     Cholecalciferol (VITAMIN D3) 50 MCG (2000 UT) CAPS Take 2,000 Units by mouth daily 90 capsule 1     COMPOUND CONTAINING CONTROLLED SUBSTANCE (CMPD RX) - PHARMACY TO MIX COMPOUNDED MEDICATION Ketamine 6%, Lidocaine 10% in PLO    Apply small amount to area BID  g 0     cromolyn (OPTICROM) 4 % ophthalmic solution Place 1 drop into both eyes 4 times daily 10 mL 0     cromolyn sodium (NASALCROM) 5.2 MG/ACT AERS Inhaler SPRAY ONE SPRAY( 1  ML) IN NOSTRIL DAILY 1 Bottle 6     [employer] FIBER GUMMY BEARS CHILDREN CHEW Take 5 g by mouth daily (2 gummy= 5 g =1 serving) 120 tablet 3     cyclobenzaprine (FLEXERIL) 10 MG tablet Take 1 tablet (10 mg) by mouth 3 times daily as needed for muscle spasms 30 tablet 2     diazepam (VALIUM) 5 MG tablet Take 1 tablet (5 mg) by mouth nightly as needed for anxiety 20 tablet 0     diclofenac (VOLTAREN) 1 % topical gel Apply affected area two times daily as needed using enclosed dosing card. (2-4 grams to chest wall) 100 g 1     fluticasone (FLONASE) 50 MCG/ACT nasal spray Spray 1-2 sprays into both nostrils daily 16 g 2     furosemide (LASIX) 20 MG tablet Take 1 tablet (20 mg) by mouth 2 times daily 90 tablet 3     HYDROcodone-acetaminophen (NORCO)  MG per tablet Take 1 tablet by mouth every 4 (four) hours as needed for pain.  0     ketamine 100 mg/mL (KETALAR) 100 MG/ML intranasal solution 1 spray       KLOR-CON 20 MEQ CR tablet TAKE ONE TABLET BY MOUTH TWICE DAILY 60 tablet 0     levothyroxine (SYNTHROID/LEVOTHROID) 112 MCG tablet Take 2 tablets (224 mcg) by mouth daily 180 tablet 4     lidocaine (XYLOCAINE) 5 % external ointment Apply quarter size amount to chest and back up to 3 times daily as needed for pain. 350 g 1     lidocaine-prilocaine (EMLA) cream Apply topically as needed for moderate pain 60 g 1     magic mouthwash (FIRST-MOUTHWASH BLM) suspension Swish and spit 5-10 mLs in mouth every 6 hours as needed for mouth sores 237 mL 1     magnesium 200 MG TABS Take 100 mg by mouth 3 times daily        MAGNESIUM GLYCINATE PLUS PO Take 400 mg by mouth 3 times daily       Menaquinone-7 (VITAMIN K2) 100 MCG CAPS Take 1 capsule by mouth daily       methocarbamol (ROBAXIN) 750 MG tablet Take 1 tablet (750 mg) by mouth 4 times daily . Ok to take a 5th tablet on very severe days.  3     montelukast (SINGULAIR) 10 MG tablet Take 2 tablets (20 mg) by mouth 2 times daily 120 tablet 10     olopatadine (PATANOL) 0.1 %  ophthalmic solution Apply 1 drop to eye       ondansetron (ZOFRAN-ODT) 8 MG ODT tab Take 1 tablet (8 mg) by mouth every 8 hours as needed 30 tablet 3     order for DME Compression socks - knee high 20-30 mmHg zippered if possible 1 Units 11     oxyCODONE IR (ROXICODONE) 10 MG tablet Take 10 mg by mouth every 4 hours as needed       polyethylene glycol (MIRALAX) powder Take 17 g (1 capful) by mouth daily 510 g 0     Probiotic Product (PROBIOTIC DAILY PO) Take 1 capsule by mouth daily Lacto acid bifidobacterium       prochlorperazine (COMPAZINE) 10 MG tablet Take 10 mg by mouth as needed  3     ranitidine (ZANTAC) 75 MG tablet Take 1 tablet (75 mg) by mouth 3 times daily 90 tablet 0     senna-docusate (SENOKOT-S;PERICOLACE) 8.6-50 MG per tablet Take 1-2 tablets by mouth 2 times daily as needed for constipation 60 tablet 0     SUMAtriptan (IMITREX) 50 MG tablet Take 1 tablet by mouth as needed for migraine . May repeat dose in 2 hours as needed. Maximum dose 4 tablets in 24 hours 9 tablet 0     traMADol (ULTRAM) 50 MG tablet Take 1 tablet (50 mg) by mouth every 6 hours as needed for severe pain 30 tablet 0     triamcinolone (KENALOG) 0.025 % ointment Apply topically as needed  3     Triamcinolone Acetonide (AZMACORT IN) Inhale 2 puffs into the lungs as needed       UNABLE TO FIND Take 2 capsules by mouth 3 times daily Muscle Mag. 2 caps contain B1 20mg, B2 20mg, B6 10mg, magesium 20mg, manganese 2mg.       UNABLE TO FIND 3 tablets 3 times daily MEDICATION NAME: calcium D-Glucarate   3 caps contain 180mg of elemental calcium.       UNABLE TO FIND 1 tablet daily MEDICATION NAME: Pure Encapsulations       EPINEPHrine (EPIPEN 2-CARIDAD) 0.3 MG/0.3ML injection 2-pack Inject 0.3 mLs (0.3 mg) into the muscle once as needed for anaphylaxis (Patient not taking: Reported on 11/19/2020) 0.6 mL 3     naloxone (NARCAN) nasal spray Spray 1 spray (4 mg) into one nostril alternating nostrils as needed for opioid reversal every 2-3 minutes  "until assistance arrives (Patient not taking: Reported on 11/19/2020) 0.2 mL 0     Dilaudid 8 mg every 34 hours  She is not on MS  Ketamine trochar every 3 hours  She is currently not using oxycodone.     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  There is no height or weight on file to calculate BMI.   BP Readings from Last 1 Encounters:   11/11/20 (!) 159/84      Pulse Readings from Last 1 Encounters:   11/11/20 87      Resp Readings from Last 1 Encounters:   01/29/20 16      Temp Readings from Last 1 Encounters:   11/11/20 98.3  F (36.8  C) (Oral)      SpO2 Readings from Last 1 Encounters:   11/11/20 99%      Wt Readings from Last 1 Encounters:   11/11/20 103.4 kg (227 lb 14.4 oz)      Ht Readings from Last 1 Encounters:   11/11/20 1.651 m (5' 5\")   GENERAL: baseline appearance in no distress  SKIN: Visible skin clear. No significant rash, abnormal pigmentation or lesions.  Hair is very short (approx 1 cm long)  EYES: Eyes grossly normal to inspection.  No discharge or erythema, or obvious scleral/conjunctival abnormalities.  NECK: no visible masses; no grossly visible goiter  RESP: No audible wheeze, cough, or visible cyanosis.  No visible retractions or increased work of breathing.    NEURO: Awake, alert, responds appropriately to questions.  Mentation and speech fluent.  PSYCH:affect normal, judgement and insight intact, and appearance well-groomed.    DATA REVIEW    ENDO THYROID LABS[facility] Latest Ref Rng & Units 11/11/2020 1/29/2020   TSH 0.40 - 4.00 mU/L 0.06 (L) 0.01 (L)   T4 FREE 0.76 - 1.46 ng/dL 1.23 1.35   FREE T3 2.3 - 4.2 pg/mL     THYROGLOBULIN ANTIBODY " <40 IU/mL       ENDO THYROID LABS[facility] Latest Ref Rng & Units 12/13/2019 10/29/2019   TSH 0.40 - 4.00 mU/L 0.01 (L) <0.01 (L)   T4 FREE 0.76 - 1.46 ng/dL 1.53 (H) 1.57 (H)   FREE T3 2.3 - 4.2 pg/mL     THYROGLOBULIN ANTIBODY <40 IU/mL       ENDO THYROID LABS[facility] Latest Ref Rng & Units 8/8/2019   TSH 0.40 - 4.00 mU/L <0.01 (L)   T4 FREE 0.76 - 1.46 ng/dL 1.61 (H)   FREE T3 2.3 - 4.2 pg/mL 3.1   THYROGLOBULIN ANTIBODY <40 IU/mL <20     ENDO CALCIUM LABS-[facility] Latest Ref Rng & Units 11/11/2020   CALCIUM 8.5 - 10.1 mg/dL 8.2 (L)   CALCIUM IONIZED 4.4 - 5.2 mg/dL    CALCIUM IONIZED WHOLE BLOOD 4.4 - 5.2 mg/dL    PHOSPHOROUS 2.5 - 4.5 mg/dL    MAGNESIUM 1.6 - 2.3 mg/dL    ALBUMIN 3.4 - 5.0 g/dL 3.4   BUN 7 - 30 mg/dL 16   CREATININE 0.52 - 1.04 mg/dL 0.94   CREATININE 0.52 - 1.04 mg/dL    PARATHYROID HORMONE INTACT 18 - 80 pg/mL    ALKPHOS 40 - 150 U/L 100   25 OH VIT D2 ug/L    25 OH VIT D3 ug/L    25 OH VIT D TOTAL 20 - 75 ug/L    VITAMIN D DEFICIENCY SCREENING 20 - 75 ug/L    PROTEIN, TOTAL 6.8 - 8.8 g/dL 7.4   CALCIUM URINE G/24 H 0.10 - 0.30 g/24 h    CALCIUM URINE G/G CR g/g Cr    CALCIUM URINE MG/DL mg/dL    CK TOTAL 30 - 225 U/L 107     ENDO CALCIUM LABS-[facility] Latest Ref Rng & Units 5/5/2020 2/6/2020   CALCIUM 8.5 - 10.1 mg/dL 8.3 (L)    CALCIUM IONIZED 4.4 - 5.2 mg/dL     CALCIUM IONIZED WHOLE BLOOD 4.4 - 5.2 mg/dL     PHOSPHOROUS 2.5 - 4.5 mg/dL     MAGNESIUM 1.6 - 2.3 mg/dL     ALBUMIN 3.4 - 5.0 g/dL 3.5    BUN 7 - 30 mg/dL 13    CREATININE 0.52 - 1.04 mg/dL 0.90    CREATININE 0.52 - 1.04 mg/dL  1.3 (H)   PARATHYROID HORMONE INTACT 18 - 80 pg/mL     ALKPHOS 40 - 150 U/L 90    25 OH VIT D2 ug/L     25 OH VIT D3 ug/L     25 OH VIT D TOTAL 20 - 75 ug/L     VITAMIN D DEFICIENCY SCREENING 20 - 75 ug/L     PROTEIN, TOTAL 6.8 - 8.8 g/dL 7.0    CALCIUM URINE G/24 H 0.10 - 0.30 g/24 h     CALCIUM URINE G/G CR g/g Cr     CALCIUM URINE MG/DL mg/dL     CK TOTAL 30 - 225 U/L 81      ENDO CALCIUM LABS-[facility] Latest Ref Rng & Units  1/29/2020 1/6/2020   CALCIUM 8.5 - 10.1 mg/dL 8.7 9.1   CALCIUM IONIZED 4.4 - 5.2 mg/dL     CALCIUM IONIZED WHOLE BLOOD 4.4 - 5.2 mg/dL     PHOSPHOROUS 2.5 - 4.5 mg/dL     MAGNESIUM 1.6 - 2.3 mg/dL     ALBUMIN 3.4 - 5.0 g/dL 3.5 3.8   BUN 7 - 30 mg/dL 18 19   CREATININE 0.52 - 1.04 mg/dL 1.25 (H) 1.06 (H)   CREATININE 0.52 - 1.04 mg/dL     PARATHYROID HORMONE INTACT 18 - 80 pg/mL     ALKPHOS 40 - 150 U/L 92 105   25 OH VIT D2 ug/L     25 OH VIT D3 ug/L     25 OH VIT D TOTAL 20 - 75 ug/L     VITAMIN D DEFICIENCY SCREENING 20 - 75 ug/L     PROTEIN, TOTAL 6.8 - 8.8 g/dL 6.9 7.8   CALCIUM URINE G/24 H 0.10 - 0.30 g/24 h     CALCIUM URINE G/G CR g/g Cr     CALCIUM URINE MG/DL mg/dL     CK TOTAL 30 - 225 U/L 49 83     ENDO CALCIUM LABS-[facility] Latest Ref Rng & Units 12/15/2019   CALCIUM 8.5 - 10.1 mg/dL    CALCIUM IONIZED 4.4 - 5.2 mg/dL    CALCIUM IONIZED WHOLE BLOOD 4.4 - 5.2 mg/dL    PHOSPHOROUS 2.5 - 4.5 mg/dL    MAGNESIUM 1.6 - 2.3 mg/dL    ALBUMIN 3.4 - 5.0 g/dL    BUN 7 - 30 mg/dL    CREATININE 0.52 - 1.04 mg/dL    CREATININE 0.52 - 1.04 mg/dL    PARATHYROID HORMONE INTACT 18 - 80 pg/mL    ALKPHOS 40 - 150 U/L    25 OH VIT D2 ug/L    25 OH VIT D3 ug/L    25 OH VIT D TOTAL 20 - 75 ug/L    VITAMIN D DEFICIENCY SCREENING 20 - 75 ug/L    PROTEIN, TOTAL 6.8 - 8.8 g/dL    CALCIUM URINE G/24 H 0.10 - 0.30 g/24 h    CALCIUM URINE G/G CR g/g Cr    CALCIUM URINE MG/DL mg/dL    CK TOTAL 30 - 225 U/L 1,694 (HH)     ENDO CALCIUM LABS-[facility] Latest Ref Rng & Units 12/13/2019   CALCIUM 8.5 - 10.1 mg/dL 8.6   CALCIUM IONIZED 4.4 - 5.2 mg/dL 4.2 (L)   CALCIUM IONIZED WHOLE BLOOD 4.4 - 5.2 mg/dL    PHOSPHOROUS 2.5 - 4.5 mg/dL    MAGNESIUM 1.6 - 2.3 mg/dL 2.2   ALBUMIN 3.4 - 5.0 g/dL 4.0   BUN 7 - 30 mg/dL 15   CREATININE 0.52 - 1.04 mg/dL 0.94   CREATININE 0.52 - 1.04 mg/dL    PARATHYROID HORMONE INTACT 18 - 80 pg/mL    ALKPHOS 40 - 150 U/L 102   25 OH VIT D2 ug/L    25 OH VIT D3 ug/L    25 OH VIT D TOTAL 20 - 75 ug/L    VITAMIN D  DEFICIENCY SCREENING 20 - 75 ug/L    PROTEIN, TOTAL 6.8 - 8.8 g/dL 7.7   CALCIUM URINE G/24 H 0.10 - 0.30 g/24 h    CALCIUM URINE G/G CR g/g Cr    CALCIUM URINE MG/DL mg/dL    CK TOTAL 30 - 225 U/L 3,308 ()       Again, thank you for allowing me to participate in the care of your patient.      Sincerely,    Avelina Castro MD

## 2020-11-20 NOTE — PATIENT INSTRUCTIONS
Reduce LT4 to 2/day (224 mcg/day total).  Repeat labs in 2 months (late January or Feb, 2021)    Space out the calcium and magnesium more, keeping the dose the same but taking smaller doses more frequently    24 hour urine at your convenience

## 2020-11-21 PROBLEM — R25.2 MUSCLE CRAMPS: Status: ACTIVE | Noted: 2020-11-21

## 2020-11-23 ENCOUNTER — COMMUNICATION - HEALTHEAST (OUTPATIENT)
Dept: PALLIATIVE MEDICINE | Facility: OTHER | Age: 60
End: 2020-11-23

## 2020-11-23 DIAGNOSIS — G89.29 OTHER CHRONIC PAIN: ICD-10-CM

## 2020-11-30 ENCOUNTER — COMMUNICATION - HEALTHEAST (OUTPATIENT)
Dept: PALLIATIVE MEDICINE | Facility: OTHER | Age: 60
End: 2020-11-30

## 2020-11-30 DIAGNOSIS — E87.6 HYPOKALEMIA: ICD-10-CM

## 2020-11-30 DIAGNOSIS — F11.20 CONTINUOUS OPIOID DEPENDENCE (H): ICD-10-CM

## 2020-12-01 ENCOUNTER — COMMUNICATION - HEALTHEAST (OUTPATIENT)
Dept: PALLIATIVE MEDICINE | Facility: OTHER | Age: 60
End: 2020-12-01

## 2020-12-02 RX ORDER — POTASSIUM CHLORIDE 1500 MG/1
TABLET, EXTENDED RELEASE ORAL
Qty: 60 TABLET | Refills: 0 | Status: SHIPPED | OUTPATIENT
Start: 2020-12-02 | End: 2024-08-14

## 2020-12-04 ENCOUNTER — AMBULATORY - HEALTHEAST (OUTPATIENT)
Dept: PALLIATIVE MEDICINE | Facility: OTHER | Age: 60
End: 2020-12-04

## 2020-12-04 ENCOUNTER — TRANSFERRED RECORDS (OUTPATIENT)
Dept: HEALTH INFORMATION MANAGEMENT | Facility: CLINIC | Age: 60
End: 2020-12-04

## 2020-12-07 ENCOUNTER — COMMUNICATION - HEALTHEAST (OUTPATIENT)
Dept: PALLIATIVE MEDICINE | Facility: OTHER | Age: 60
End: 2020-12-07

## 2020-12-07 DIAGNOSIS — G89.29 OTHER CHRONIC PAIN: ICD-10-CM

## 2020-12-15 ENCOUNTER — COMMUNICATION - HEALTHEAST (OUTPATIENT)
Dept: PALLIATIVE MEDICINE | Facility: OTHER | Age: 60
End: 2020-12-15

## 2020-12-15 DIAGNOSIS — G89.29 OTHER CHRONIC PAIN: ICD-10-CM

## 2020-12-17 ENCOUNTER — HOSPITAL ENCOUNTER (OUTPATIENT)
Dept: PALLIATIVE MEDICINE | Facility: OTHER | Age: 60
Discharge: HOME | End: 2020-12-17
Attending: ANESTHESIOLOGY

## 2020-12-17 ENCOUNTER — TRANSFERRED RECORDS (OUTPATIENT)
Dept: HEALTH INFORMATION MANAGEMENT | Facility: CLINIC | Age: 60
End: 2020-12-17

## 2020-12-17 DIAGNOSIS — G89.4 CHRONIC PAIN SYNDROME: ICD-10-CM

## 2020-12-23 ENCOUNTER — AMBULATORY - HEALTHEAST (OUTPATIENT)
Dept: LAB | Facility: HOSPITAL | Age: 60
End: 2020-12-23

## 2020-12-23 ENCOUNTER — HOSPITAL ENCOUNTER (OUTPATIENT)
Dept: PALLIATIVE MEDICINE | Facility: OTHER | Age: 60
Discharge: HOME | End: 2020-12-23
Attending: ANESTHESIOLOGY

## 2020-12-23 DIAGNOSIS — G89.29 OTHER CHRONIC PAIN: ICD-10-CM

## 2020-12-23 DIAGNOSIS — T78.1XXD POLLEN-FOOD ALLERGY, SUBSEQUENT ENCOUNTER: ICD-10-CM

## 2020-12-23 LAB — C REACTIVE PROTEIN LHE: 0.6 MG/DL (ref 0–0.8)

## 2020-12-23 ASSESSMENT — MIFFLIN-ST. JEOR: SCORE: 1555.19

## 2020-12-24 LAB
25(OH)D3 SERPL-MCNC: 26.2 NG/ML (ref 30–80)
FASTING STATUS PATIENT QL REPORTED: NORMAL
GLIADIN IGA SER-ACNC: 1 U/ML
GLIADIN IGG SER-ACNC: <0.4 U/ML
HOMOCYSTEINE PLASMA - HISTORICAL: 10 UMOL/L (ref 0–13)
IGA SERPL-MCNC: 156 MG/DL (ref 65–400)
TTG IGA SER-ACNC: 0.2 U/ML
TTG IGG SER-ACNC: <0.6 U/ML

## 2020-12-30 ENCOUNTER — COMMUNICATION - HEALTHEAST (OUTPATIENT)
Dept: PALLIATIVE MEDICINE | Facility: OTHER | Age: 60
End: 2020-12-30

## 2021-01-07 ENCOUNTER — COMMUNICATION - HEALTHEAST (OUTPATIENT)
Dept: PALLIATIVE MEDICINE | Facility: OTHER | Age: 61
End: 2021-01-07

## 2021-01-07 ENCOUNTER — MYC MEDICAL ADVICE (OUTPATIENT)
Dept: ONCOLOGY | Facility: CLINIC | Age: 61
End: 2021-01-07

## 2021-01-07 DIAGNOSIS — G43.519 INTRACTABLE PERSISTENT MIGRAINE AURA WITHOUT CEREBRAL INFARCTION AND WITHOUT STATUS MIGRAINOSUS: ICD-10-CM

## 2021-01-07 DIAGNOSIS — G89.29 OTHER CHRONIC PAIN: ICD-10-CM

## 2021-01-07 RX ORDER — SUMATRIPTAN 50 MG/1
TABLET, FILM COATED ORAL
Qty: 9 TABLET | Refills: 0 | Status: SHIPPED | OUTPATIENT
Start: 2021-01-07 | End: 2021-01-26

## 2021-01-10 ENCOUNTER — HEALTH MAINTENANCE LETTER (OUTPATIENT)
Age: 61
End: 2021-01-10

## 2021-01-12 ENCOUNTER — COMMUNICATION - HEALTHEAST (OUTPATIENT)
Dept: PALLIATIVE MEDICINE | Facility: OTHER | Age: 61
End: 2021-01-12

## 2021-01-12 ENCOUNTER — TRANSFERRED RECORDS (OUTPATIENT)
Dept: HEALTH INFORMATION MANAGEMENT | Facility: CLINIC | Age: 61
End: 2021-01-12

## 2021-01-12 ENCOUNTER — OFFICE VISIT - HEALTHEAST (OUTPATIENT)
Dept: PULMONOLOGY | Facility: OTHER | Age: 61
End: 2021-01-12

## 2021-01-12 DIAGNOSIS — G89.29 OTHER CHRONIC PAIN: ICD-10-CM

## 2021-01-12 DIAGNOSIS — J45.909 ASTHMA, UNSPECIFIED ASTHMA SEVERITY, UNSPECIFIED WHETHER COMPLICATED, UNSPECIFIED WHETHER PERSISTENT: ICD-10-CM

## 2021-01-14 ENCOUNTER — COMMUNICATION - HEALTHEAST (OUTPATIENT)
Dept: PALLIATIVE MEDICINE | Facility: OTHER | Age: 61
End: 2021-01-14

## 2021-01-20 DIAGNOSIS — R07.89 CHEST WALL PAIN: ICD-10-CM

## 2021-01-22 ENCOUNTER — OFFICE VISIT - HEALTHEAST (OUTPATIENT)
Dept: PALLIATIVE MEDICINE | Facility: OTHER | Age: 61
End: 2021-01-22

## 2021-01-22 DIAGNOSIS — G89.29 OTHER CHRONIC PAIN: ICD-10-CM

## 2021-01-22 RX ORDER — CELECOXIB 200 MG/1
200 CAPSULE ORAL 2 TIMES DAILY
Qty: 60 CAPSULE | Refills: 2 | Status: SHIPPED | OUTPATIENT
Start: 2021-01-22 | End: 2021-04-29

## 2021-01-23 DIAGNOSIS — G43.519 INTRACTABLE PERSISTENT MIGRAINE AURA WITHOUT CEREBRAL INFARCTION AND WITHOUT STATUS MIGRAINOSUS: ICD-10-CM

## 2021-01-25 DIAGNOSIS — G43.519 INTRACTABLE PERSISTENT MIGRAINE AURA WITHOUT CEREBRAL INFARCTION AND WITHOUT STATUS MIGRAINOSUS: ICD-10-CM

## 2021-01-26 RX ORDER — SUMATRIPTAN 50 MG/1
TABLET, FILM COATED ORAL
Qty: 9 TABLET | Refills: 0 | Status: SHIPPED | OUTPATIENT
Start: 2021-01-26 | End: 2021-10-12

## 2021-01-26 NOTE — CONFIDENTIAL NOTE
Medication is being filled for 1 time refill only due to:  Patient needs to be seen because it has been more than one year since last visit.

## 2021-01-28 RX ORDER — SUMATRIPTAN 50 MG/1
TABLET, FILM COATED ORAL
Qty: 9 TABLET | Refills: 0 | OUTPATIENT
Start: 2021-01-28

## 2021-02-01 ENCOUNTER — COMMUNICATION - HEALTHEAST (OUTPATIENT)
Dept: PALLIATIVE MEDICINE | Facility: OTHER | Age: 61
End: 2021-02-01

## 2021-02-01 DIAGNOSIS — G89.29 OTHER CHRONIC PAIN: ICD-10-CM

## 2021-02-02 ENCOUNTER — COMMUNICATION - HEALTHEAST (OUTPATIENT)
Dept: PALLIATIVE MEDICINE | Facility: OTHER | Age: 61
End: 2021-02-02

## 2021-02-02 DIAGNOSIS — G89.29 OTHER CHRONIC PAIN: ICD-10-CM

## 2021-02-11 ENCOUNTER — TELEPHONE (OUTPATIENT)
Dept: ENDOCRINOLOGY | Facility: CLINIC | Age: 61
End: 2021-02-11

## 2021-02-12 ENCOUNTER — COMMUNICATION - HEALTHEAST (OUTPATIENT)
Dept: PALLIATIVE MEDICINE | Facility: OTHER | Age: 61
End: 2021-02-12

## 2021-02-12 DIAGNOSIS — G89.29 OTHER CHRONIC PAIN: ICD-10-CM

## 2021-03-01 ENCOUNTER — COMMUNICATION - HEALTHEAST (OUTPATIENT)
Dept: PALLIATIVE MEDICINE | Facility: OTHER | Age: 61
End: 2021-03-01

## 2021-03-01 DIAGNOSIS — G89.29 OTHER CHRONIC PAIN: ICD-10-CM

## 2021-03-08 ENCOUNTER — COMMUNICATION - HEALTHEAST (OUTPATIENT)
Dept: PALLIATIVE MEDICINE | Facility: OTHER | Age: 61
End: 2021-03-08

## 2021-03-09 ENCOUNTER — HOSPITAL ENCOUNTER (OUTPATIENT)
Dept: PALLIATIVE MEDICINE | Facility: OTHER | Age: 61
Discharge: HOME | End: 2021-03-09
Attending: ANESTHESIOLOGY

## 2021-03-09 ENCOUNTER — TRANSFERRED RECORDS (OUTPATIENT)
Dept: HEALTH INFORMATION MANAGEMENT | Facility: CLINIC | Age: 61
End: 2021-03-09

## 2021-03-09 DIAGNOSIS — Z86.69 HISTORY OF MIGRAINE HEADACHES: ICD-10-CM

## 2021-03-09 DIAGNOSIS — G89.29 OTHER CHRONIC PAIN: ICD-10-CM

## 2021-03-22 ENCOUNTER — COMMUNICATION - HEALTHEAST (OUTPATIENT)
Dept: PALLIATIVE MEDICINE | Facility: OTHER | Age: 61
End: 2021-03-22

## 2021-03-22 DIAGNOSIS — G89.29 OTHER CHRONIC PAIN: ICD-10-CM

## 2021-03-26 ENCOUNTER — COMMUNICATION - HEALTHEAST (OUTPATIENT)
Dept: PALLIATIVE MEDICINE | Facility: OTHER | Age: 61
End: 2021-03-26

## 2021-04-05 ENCOUNTER — COMMUNICATION - HEALTHEAST (OUTPATIENT)
Dept: PALLIATIVE MEDICINE | Facility: OTHER | Age: 61
End: 2021-04-05

## 2021-04-05 DIAGNOSIS — G89.29 OTHER CHRONIC PAIN: ICD-10-CM

## 2021-04-19 ENCOUNTER — COMMUNICATION - HEALTHEAST (OUTPATIENT)
Dept: PALLIATIVE MEDICINE | Facility: OTHER | Age: 61
End: 2021-04-19

## 2021-04-19 DIAGNOSIS — G89.29 OTHER CHRONIC PAIN: ICD-10-CM

## 2021-04-25 ENCOUNTER — COMMUNICATION - HEALTHEAST (OUTPATIENT)
Dept: PALLIATIVE MEDICINE | Facility: OTHER | Age: 61
End: 2021-04-25

## 2021-04-25 DIAGNOSIS — G89.29 OTHER CHRONIC PAIN: ICD-10-CM

## 2021-04-28 DIAGNOSIS — R07.89 CHEST WALL PAIN: ICD-10-CM

## 2021-04-29 RX ORDER — CELECOXIB 200 MG/1
200 CAPSULE ORAL 2 TIMES DAILY
Qty: 60 CAPSULE | Refills: 2 | Status: SHIPPED | OUTPATIENT
Start: 2021-04-29 | End: 2021-08-09

## 2021-04-29 NOTE — TELEPHONE ENCOUNTER
Routing refill request to provider for review/approval because:  BP and appointment needed.          Pending Prescriptions:                       Disp   Refills    celecoxib (CELEBREX) 200 MG capsule        60 cap*2        Sig: Take 1 capsule (200 mg) by mouth 2 times daily        Ford Bhagat RN

## 2021-05-05 ENCOUNTER — COMMUNICATION - HEALTHEAST (OUTPATIENT)
Dept: PALLIATIVE MEDICINE | Facility: OTHER | Age: 61
End: 2021-05-05

## 2021-05-05 ENCOUNTER — TRANSFERRED RECORDS (OUTPATIENT)
Dept: HEALTH INFORMATION MANAGEMENT | Facility: CLINIC | Age: 61
End: 2021-05-05

## 2021-05-05 ENCOUNTER — HOSPITAL ENCOUNTER (OUTPATIENT)
Dept: PALLIATIVE MEDICINE | Facility: OTHER | Age: 61
Discharge: HOME | End: 2021-05-05
Attending: ANESTHESIOLOGY
Payer: COMMERCIAL

## 2021-05-05 DIAGNOSIS — G89.29 OTHER CHRONIC PAIN: ICD-10-CM

## 2021-05-05 DIAGNOSIS — M79.2 NEUROPATHIC PAIN: ICD-10-CM

## 2021-05-05 DIAGNOSIS — T78.1XXD POLLEN-FOOD ALLERGY, SUBSEQUENT ENCOUNTER: ICD-10-CM

## 2021-05-17 ENCOUNTER — COMMUNICATION - HEALTHEAST (OUTPATIENT)
Dept: PALLIATIVE MEDICINE | Facility: OTHER | Age: 61
End: 2021-05-17

## 2021-05-17 DIAGNOSIS — G89.29 OTHER CHRONIC PAIN: ICD-10-CM

## 2021-05-27 VITALS — HEIGHT: 65 IN | BODY MASS INDEX: 37.92 KG/M2

## 2021-06-02 NOTE — PATIENT INSTRUCTIONS - HE
"PLAN:    Baseline Urine drug test and opioid use agreement    Sign WENDY for Dr. Berry to speak to your therapist    \"Bone Health\" vitamin K2, boron, calcium hydroxyappetite one twice a day    Discussed electromagnetic therapy may contact Leilani Woodruff, also does energy work contact [email or web address]    May contact Dr. Jesus Levy [phone], tell Dr. Berry who you plan to see and will call    Hydrocodone 10 mg every four hours as needed    Discussed other options such as other ketamine forms, oxytocin    F/u with Monty Ortiz, our MTM pharmacist in 2-3 weeks, and Dr. Berry in 6 weeks  "

## 2021-06-02 NOTE — TELEPHONE ENCOUNTER
Had gotten call from Margarita at [facility]. Wanted to know what was going on, since Marcelluscruzito is unhappy with them, since they will not supply medication to her. Wondering about getting her scheduled.   I tried to reach Margarita, I left her a message that we are waiting for her paperwork. Can jarrod back tomorrow if she wishes to discuss.

## 2021-06-02 NOTE — TELEPHONE ENCOUNTER
Left call after hrs, wanting to speak with Dr Berry. Only left first name and phone no. This call was transferred to Dr Berry phone line.  does not know who this is, so asked that I return her call.    I spoke with her this afternoon.  She is upset about not being scheduled with Dr Berry. She has been waiting since August. Claims has filled out all the paperwork, and done everything necessary. She said, that her referring Dr. , ( Dr Marcello Sandhu) has spoken directly to Dr Berry and agreed to taking her as a patient. DR Sandhu is with  a clinic,  Called,  [facility].     She is having bone pain, as a result of being on Tamoxifen.She said has been on opiates, and then abuptly taken off, because of the opioid epidemic. She said has not had pain meds for months. She has been on Medical leave of absence, effective until Nov. She is concerned about leave. Today, she is quite demanding that she get scheduled.     I told her will review record,  and discuss with Dr Berry and scheduling.   Will call her back, or will have scheduling call her.    I spoke with Dr Berry, he said had discussed with Dr Sandhu and agreed will see her as a patient. Dr Berry said would need to follow process for new patient evaluation . I then spoke with Vanessa BROWN from the .  She explained that they have not received the paperwork filled out, so that is the reason has not been scheduled. Patient had been notified of this. Have had some issues regarding getting her scheduled. Vanessa talked to primary clinic and explained situation. Clinic director has also been informed that Tisha is dis-satified with process.    I called her back just now, at 4:30p. I explained that we still have not gotten her paperwork. I asked if she would fill out and drop off at clinic. She was most unhappy. She kept telling me that we dropped the ball, that we are denying care. I asked again if she will  bring paperwork to clinic, and I will be here to receive. She will do that.     She kept telling me that we dropped the ball, and very unhappy with wait. She said was told woulld be Dec before appt, but that is not acceptable, since Dr Berry said would see her.  I did recommend that she get referral to another pain clinic, if can't wait for appt. I also informed her that Dr Berry is aware of situation.

## 2021-06-02 NOTE — PROGRESS NOTES
MTM Initial Encounter  Assessment & Plan                                                     Chronic Pain/Bone Pain: Needs improvement - Tamoxifen can cause increased bone pain due to estrogenic effects. Pt has noted significant improvements since discontinuing and also some improvement since starting Vitamin K2 supplement. May benefit from a DXA bone density scan, as data shows that patients may benefit from Bisphosphonates for mild pain relief, but also are at increased fracture risk.     For pain control, patient may benefit from use of oral NSAID, however given GI history, would be very hesitant to use. If needed, would recommend Cox1 selective options in conjunction with PPI with approval from GI provider. For now, will have patient continue topical diclofenac     Patient may benefit from switching from nasal Ketamine to oral Ketamine, may get better pain relief from oral doses.      Cramping pain patient reports may be due to low magnesium levels, especially given diuretic use. Last level was over 6 months ago, but as at the low-end of normal. May benefit from trial of supplementation.   -Consider DXA referral (discuss with PCP)  -Recommend switching to oral Ketamine troches. 20 mg 0.5 katharina Q6H   -Queued refills of Norco and Flexeril per pt request   -Trial Magnesium Oxide 400 mg daily     S/P Cystoscopy : Improving - complete antibiotics and follow-up with urology as recommended   -Continue current therapy     Anxiety/Mood: Needs improvement - anxiety not discussed in detail today. Pt did mention she would be okay eventually discontinuing medications as able, but likely needs a refill in the mean time to avoid BZD withdrawal.   -Pt to request BZD refills from previous provider.     Follow Up  No follow-ups on file.  Schedule with PharmD after next appointment with Dr. Berry     Subjective & Objective                                                     Gilaugie Arias is a 59 y.o. female coming in  "for an initial visit for Medication Therapy Management. She was referred to me from Thanh Berry MD     Chief Complaint: Medication Management     Chronic Pain/Bone Pain: Prescribed hydrocodone-Acetaminophen  mg Q4h as needed, diclofenac 1% gel four times a day as needed, and Cyclobenzaprine 10 mg three times a day as needed. Also using Calcium Citrate - Vitamin D3 315-250 mg-unit 2 tabs three times a day, and Vitamin D3 2000 units daily.     Has had a Rachael-en-Y so cannot use NSAIDs. No other history of GI ulcers or bleeds. Has tried Motrin in the past and stomach feels \"raw and turned inside out.\" Has tried Celebrex in the past, did not have the same reaction, but stopped it due to interaction with Tamoxifen.     Requesting refill of Norco, Flexeril, Ketamine, and Diazepam.     Doesn't find significant relief from opioid. Took at dose at 7:30 am and pain level largely unchanged.     Not using Diclofenac four times a day - usually once or twice daily. Alternating with the Ketamine 100 mg/mL Q6H. Doesn't seem to find Diclofenac particularly helpful. Has not tried oral ketamine - did discuss with this Dr. Berry at last visit.     Pain thought to be related to Tamoxifen use which she stopped in early October. At visit with Dr. Berry, noted to have some improvement in her pain in her tibia. Today, continues to note improvement in pain, though still present. Has started Vitamin K2 supplement 100 mg daily. Has taken the severity down a notch, but the pain is still there.      Following with Dr. 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 Using Montelukast 10 mg daily and Cetirizine 10 mg daily without clear benefit. More of MCAS is food related so only using Cetirizine during allergy seasons or if eaten something that has caused flare.       Last bone density test was in 2012. Has not had a repeat since. Primary care has not talked about it. Very seldom does she see primary care " given she's following mostly with specialty. Not seeing Oncology until January.  Only Vit D level available was from 2015 and that was low.       Especially at night, has a lot of cramping in legs, thighs, and hands.      Ref Range & Units 4/6/19 0557   MAGNESIUM 1.6 - 2.6 mg/dL 1.6        S/P Cystoscopy : Was seen in the ED on 11/1 for uretral stone. Prescribed additional 15 tabs of Norco, Cipro 250 mg two times a day, and Phenazopyridine 200 mg three times a day as needed.  did  the Norco, but pt has not used. Pending test results to find out what kind of stone formed. Due to complete course of Pyridum and Cipro today.     Anxiety/Mood: Using Diazepam 5 mg at bedtime as needed. Tends to use this nightly. Denies any concerns with depression. Reports this was previously prescribed by the pain provider. Requesting refills. Has 2 tablets left.        PMH: reviewed in EPIC   Allergies/ADRs: reviewed in EPIC   Alcohol:   Social History     Substance and Sexual Activity   Alcohol Use Not on file     Tobacco:   Social History     Tobacco Use   Smoking Status Never Smoker   Smokeless Tobacco Never Used     Today's Vitals:   Vitals:    11/04/19 0953   BP: 153/85   Pulse: 79     ----------------    Much or all of the text in this note was generated through the use of Dragon Dictate voice-to-text software. Errors in spelling or words which seem out of context are unintentional. Sound alike errors, in particular, may have escaped editing.    The patient was given a summary of these recommendations as an after visit summary    I spent 45 minutes with this patient today.   All changes were made via collaborative practice agreement with Thanh Berry MD . A copy of the visit note was provided to the patient's provider.     Monty Ortiz PharmD  Medication Therapy Management (MTM) Pharmacist  [facility]         Current Outpatient Medications   Medication Sig Dispense Refill     calcium  citrate-vitamin D3 (CITRACAL + D) 315 mg- 250 unit per tablet Take 2 tablets by mouth 3 (three) times a day with meals.       diclofenac sodium (VOLTAREN) 1 % Gel Apply topically 4 (four) times a day.       fluticasone propionate (FLONASE) 50 mcg/actuation nasal spray Apply 1 spray into each nostril daily.       furosemide (LASIX) 20 MG tablet Take 20 mg by mouth 2 (two) times a day.       HYDROcodone-acetaminophen (NORCO )  mg per tablet Take 1 tablet by mouth every 4 (four) hours as needed for pain. 84 tablet 0     montelukast (SINGULAIR) 10 mg tablet Take 20 mg by mouth 2 (two) times a day.       potassium chloride (K-DUR,KLOR-CON) 20 MEQ tablet Take 20 mEq by mouth 2 (two) times a day.       VITAMIN D3 2,000 unit capsule Take 2,000 Units by mouth daily.  1     No current facility-administered medications for this visit.

## 2021-06-02 NOTE — PROGRESS NOTES
New pt is being seen today by Thanh Suarez MD, for consultation of 10-constant, deep bone pain, achy, cramping, pressure multisite pain.  F8

## 2021-06-02 NOTE — PROGRESS NOTES
"Assessment/Plan:     Problem List Items Addressed This Visit     None      Visit Diagnoses     Chronic pain syndrome    -  Primary    Relevant Medications    HYDROcodone-acetaminophen (NORCO )  mg per tablet    Other Relevant Orders    Pain Management Drug Panel, Urine (Completed)              No follow-ups on file.    Patient Instructions   PLAN:    Baseline Urine drug test and opioid use agreement    Sign WENDY for Dr. Berry to speak to your therapist    \"Bone Health\" vitamin K2, boron, calcium hydroxyappetite one twice a day    Discussed electromagnetic therapy may contact Leilani Woodruff, also does energy work contact [email or web address]    May contact Dr. Jesus Levy [phone], tell Dr. Berry who you plan to see and will call    Hydrocodone 10 mg every four hours as needed    Discussed other options such as other ketamine forms, oxytocin    F/u with Monty Ortiz, our [facility] pharmacist in 2-3 weeks, and Dr. Berry in 6 weeks        Subjective:       59 y.o. female presents for evaluation of chronic pain, concern for significant bone pain related to tamoxifen, referred by Dr. Marcello Cason.    59-year-old female living in [place] with her spouse, has 2 grown children.  She has worked as a occupational health nurse, presently on leave.    HPI: Patient provides significant background with her intake form of a variety of medical conditions, and the symptoms thought related to tamoxifen.    She describes pain problems started tamoxifen for breast cancer in 2012.  She began to have sternal pain and in her ribs, rating around her back.  Had significant muscle spasms then began to have pain in her tibia-fibula area, moving on to all other joints.  She reviewed working with pain clinics trying gabapentin and amitriptyline quality which made worse.  Placed on Topamax also made pain worse.  Initially pain was not thought to be associated with tamoxifen.  She recalls taking morphine each dose last an " hour to.  He developed a lymphoma, and was changed to oxycodone in 2017.  There was concern that this may represent opiate-induced hyperalgesia as pain moved in her shoulders and clavicles and she was changed to Suboxone had significant side effects.    Reviewed for a year and a half she was on oxycodone 30 mg 4 times a day and morphine 30 mg extended release up to 4 times a day for a year and a half, allowing her to do her job which were 8 to 16-hour shifts at times.  She was followed in palliative care at that regimen.  However again there was concern with opioid-induced hyperalgesia.    Describes going to [place] pain clinics having these changes.  She was placed on lamotrigine which made her pain worse.    She did try ketamine 10% spray each dose seemed to last about 6 hours.  However quickly developed nausea and vomiting.    She did try medical cannabis, had capsules and vaporizer which did not help, having a range of regimens.    During this time she has worked with a psychotherapist William Odonnell [phone]    Did have a trial of other chemotherapy agents without as much benefit.    Pain presently is in her chest arms shoulders back pelvis.  She describes being quite distressed as her job has filled, she is financially distressed and needs to return to some sort of work.    She did stop the tamoxifen 10 days ago.  Has been started to have some improvement in her pain in her tibia.    Pain is poor, she wakes every 2 hours.  Her appetite is poor has lost 5 or 10 pounds, weight around 185.  Her energy is very low with pain.  She notes her mood is okay, not particularly depressed describes her function.  Reviews car rides the vibration is difficult.    Other comorbid problems including thyroid cancer 13 months after breast cancer diagnosis.  Lymphoma then in 2017.    She is followed by Dr. Quintero the cough being addressed with mast cell activation syndrome taking Singulair and Zyrtec without clear benefit.   Number of rashes, and has oral allergy syndrome.    Reviews having allergy testing and tryptase levels were elevated.    She has tried gluten-free diet for 2 months without benefit.    Has tried a TENS unit with limited benefit.    She reviews methadone was not tolerated, has not used Flexeril.  Did try other opioids.  Her graph substance use history does not use alcohol cigarettes cocaine marijuana.      Current Outpatient Medications:      calcium citrate-vitamin D3 (CITRACAL + D) 315 mg- 250 unit per tablet, Take 2 tablets by mouth 3 (three) times a day with meals., Disp: , Rfl:      diclofenac sodium (VOLTAREN) 1 % Gel, Apply topically 4 (four) times a day., Disp: , Rfl:      fluticasone propionate (FLONASE) 50 mcg/actuation nasal spray, Apply 1 spray into each nostril daily., Disp: , Rfl:      furosemide (LASIX) 20 MG tablet, Take 20 mg by mouth 2 (two) times a day., Disp: , Rfl:      montelukast (SINGULAIR) 10 mg tablet, Take 20 mg by mouth 2 (two) times a day., Disp: , Rfl:      potassium chloride (K-DUR,KLOR-CON) 20 MEQ tablet, Take 20 mEq by mouth 2 (two) times a day., Disp: , Rfl:      VITAMIN D3 2,000 unit capsule, Take 2,000 Units by mouth daily., Disp: , Rfl: 1     HYDROcodone-acetaminophen (NORCO )  mg per tablet, Take 1 tablet by mouth every 4 (four) hours as needed for pain., Disp: 84 tablet, Rfl: 0  Allergies   Allergen Reactions     Buprenorphine      Chest pain, GI, cramping     Cymbalta [Duloxetine] Anaphylaxis     Nuts - Unspecified Anaphylaxis     Ragweed Pollen Anaphylaxis     Topiramate Anaphylaxis     ataxia     Amitriptyline      seratonin syndrome      Baclofen Swelling     Codeine Nausea And Vomiting     Gabapentin Swelling     Hydroxyzine Headache     Cheat pressure, tremors, back pain     Lamotrigine Nausea And Vomiting and Headache     Fever, swollen glands, muscle pain, chest pain, bruising, neck stiffness, confusion     Lyrica [Pregabalin]      Metaxalone Nausea And  "Vomiting     Nortriptyline      Other Food Allergy      Melons, bananas, cucumber, zucchini, potatoes, carrots, cherries, celery, apples, pears, plums, peaches, parsnips, kiwi, hazelnuts, apricots     Tree And Shrub Pollen      Birch tree     Buprenorphine-Naloxone Itching, Swelling and Anxiety     chest pain, fatigue     Clonidine Anxiety     tremors     Raised in [place] have the state.  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  Hobbies include scuba diving and softball.  She has been involved with her [employer].  Denies a history of trauma.    On exam she is alert with a clear sensorium appropriately groomed.  Constricted range of affect.  Thought process tight logical         Objective:     Vitals:    10/23/19 1026   BP: (!) 190/108   Weight: 185 lb 6 oz (84.1 kg)   Height: 5' 5\" (1.651 m)   PainSc: 10-Worst pain ever   PainLoc: Chest         Pression: Chronic pain which she relates to bone pain after tamoxifen, presents some literature outlining drug drug interactions with the tamoxifen, and a variety of symptoms which she is experienced.    Reports variety of neuropathic agents, having exacerbation of pain in some instances.    Reports was able to function a relatively high dose of oxycodone and work part-time, and other examiners were concerned with opioid-induced hyperalgesia in the opioids were discontinued, trial of Suboxone that was poorly tolerated.    She is working closely with Dr. Caesar pinzon and other alternative therapies including addressing mast cell activation.    She reviews feeling urgent that she returns to some sort of work, being out of work and having her job refilled.    She has recently discontinued the tamoxifen and may beginning to notice some improvement.      We will obtain a baseline urine drug screen and and controlled substance agreement.  We discussed with the relatively high dose of oxycodone, she may have been a poor metabolizer.  Also showed using hydrocodone which " she noted had some benefit.  We will begin with a trial of hydrocodone.    We discussed options of ketamine and other forms such as a lozenge, discussed off label use of oxytocin.    Reviewed electromagnetic therapy and resources.    She was signed a release of information for me to speak to her therapist.    Time spent more than 60 minutes face-to-face, more than 50% count about above condition coordination treatment plan          This note has been dictated using voice recognition software. Any grammatical or context distortions are unintentional and inherent to the software

## 2021-06-03 VITALS — WEIGHT: 185.38 LBS | HEIGHT: 65 IN | BODY MASS INDEX: 30.89 KG/M2

## 2021-06-03 NOTE — PATIENT INSTRUCTIONS - HE
Recommendations from today's MTM visit:                                                    MTM (medication therapy management) is a service provided by a clinical pharmacist designed to help you get the most of out of your medicines. and Today we reviewed what your medicines are for, how to know if they are working, that your medicines are safe and how to make your medicine regimen as easy as possible.     1. I will work with Dr. Berry to refill your Hydrocodone and also send a prescription for oral Ketamine.     2. I will refill Cyclobenzaprine.     3. Recommend starting magnesium oxide 400 mg daily to help with muscle cramps.     4. I will talk to Dr. Berry about a referral for a DXA Bone Density scan.     It was great to speak with you today.  I value your experience and would be very thankful for your time with providing feedback on our clinic survey. You may receive a survey via email or text message in the next few days.     Next MTM visit: Will schedule as needed     To schedule another MTM appointment, please call the clinic directly or you may call the MTM scheduling line at [phone] or toll-free at [phone].     My Clinical Pharmacist's contact information:                                                      It was a pleasure seeing you today!  Please feel free to contact me with any questions or concerns you have.      Monty Ortiz, PharmD  Medication Therapy Management (MTM) Pharmacist  [facility]

## 2021-06-03 NOTE — PROGRESS NOTES
"Telephone discussions with patient's therapist Elvira, [phone] has worked the patient 5 or 6 years.  She first met her before she was on medications and went through the cancer treatment.  She could see she was clearly in pain at times.  When on the oxycodone she was functioning better, working doing more around the house.  She also had some benefit with acupuncture had a better quality of life.  Taken off the opioids she appears to be \"in agony\" a time, reports would like to be able to do activities such as visiting her grandchildren but though avoids that because of the pain.  She did read is the concept of trying to work in this much pain.  We reviewed the notion that some of her providers are taken her off the opioids with concern for opioid-induced hyperalgesia.    I reviewed that the patient has stopped tamoxifen and some symptoms may be improving.  We have started on hydrocodone and ketamine.  She will update us on her observations.  "

## 2021-06-03 NOTE — PROGRESS NOTES
MTM Initial Encounter  Assessment & Plan                                                     Chronic Pain/Bone Pain: Partially improved - Has started Ketamine and tolerating well. Noting minor relief, but nothing significant with the combination of Ketamine and Norco. Given that patient has had some relief with low doses of Ketamine, likely would benefit from continued dose titration. For muscle spasms, pt is already using muscle relaxant and myofascial release seems to be helping. Will hold off on med adjustments and have pt continue to work with PT for myofascial release.   -Per discussion with argelia Liu to increase Ketamine to 20 mg full katharina Q6H as needed. Queued new order.        Follow Up  Return in about 2 weeks (around 12/2/2019) for Medication Management Pharmacist, Via Phone.      Subjective & Objective                                                     Tisha Arias is a 59 y.o. female called for a follow-up visit for Medication Therapy Management. She was referred to me from Thanh Berry MD     Chief Complaint: Medication Management     Chronic Pain/Bone Pain: Prescribed hydrocodone-Acetaminophen  mg Q4h as needed, diclofenac 1% gel four times a day as needed, Cyclobenzaprine 10 mg three times a day as needed, and Ketamine 20 mg katharina 0.5 katharina Q6H as needed. Also using Calcium Citrate - Vitamin D3 315-250 mg-unit 2 tabs three times a day, and Vitamin D3 2000 units daily.     Initially had nausea/vomiting with Ketamine use but only from the first dose. That's exactly what [id number] with the spray. No problems since.     Using every 6 hours. Not noticing much difference. The combination of Norco and Ketamine may bring pain from a 9 or 10 down to a 7. Still has a few 20 mg katharina  - will be out Wednesday if doing a half-katharina.      Having spasms in the back and chest and can't get these to stop. Is using Flexeril for the spasms. Not stopping them completely but reducing the  spasms a little bit. Did get some relief from myofascial release and that relief is holding the relief longer than it previously has.         PMH: reviewed in EPIC   Allergies/ADRs: reviewed in EPIC   Alcohol:   Social History     Substance and Sexual Activity   Alcohol Use Not on file     Tobacco:   Social History     Tobacco Use   Smoking Status Never Smoker   Smokeless Tobacco Never Used     Today's Vitals:   There were no vitals filed for this visit.  ----------------    Much or all of the text in this note was generated through the use of Dragon Dictate voice-to-text software. Errors in spelling or words which seem out of context are unintentional. Sound alike errors, in particular, may have escaped editing.    The patient declined an after visit summary    I spent 15 minutes with this patient today.   All changes were made via collaborative practice agreement with Thahn Berry MD . A copy of the visit note was provided to the patient's provider.     Monty Ortiz PharmD  Medication Therapy Management (MTM) Pharmacist  [facility]         Current Outpatient Medications   Medication Sig Dispense Refill     calcium citrate-vitamin D3 (CITRACAL + D) 315 mg- 250 unit per tablet Take 2 tablets by mouth 3 (three) times a day with meals.       cetirizine (ZYRTEC) 10 MG tablet Take 10 mg by mouth daily as needed.       ciprofloxacin HCl (CIPRO) 250 MG tablet Take 250 mg by mouth 2 (two) times a day.       cyclobenzaprine (FLEXERIL) 10 MG tablet Take 1 tablet (10 mg total) by mouth 3 (three) times a day as needed. 45 tablet 1     diazePAM (VALIUM) 5 MG tablet Take 1 tablet by mouth as needed.  0     diclofenac sodium (VOLTAREN) 1 % Gel Apply topically 4 (four) times a day.       fluticasone propionate (FLONASE) 50 mcg/actuation nasal spray Apply 1 spray into each nostril daily.       furosemide (LASIX) 20 MG tablet Take 20 mg by mouth 2 (two) times a day.       HYDROcodone-acetaminophen (NORCO  )  mg per tablet Take 1 tablet by mouth every 4 (four) hours as needed for pain. Fill date of 11/18 84 tablet 0     [START ON 11/19/2019] ketamine HCl (KETAMINE, BULK,) 100 % Powd 20 mg troches. Use 0.5 katharina Q6H PRN. Dispense #30 30 Bottle 0     magnesium oxide (MAG-OX) 400 mg (241.3 mg magnesium) tablet Take 1 tablet (400 mg total) by mouth daily. 30 tablet 1     montelukast (SINGULAIR) 10 mg tablet Take 20 mg by mouth 2 (two) times a day.       phenazopyridine (PYRIDIUM) 200 MG tablet Take 200 mg by mouth.       potassium chloride (K-DUR,KLOR-CON) 20 MEQ tablet Take 20 mEq by mouth 2 (two) times a day.       VITAMIN D3 2,000 unit capsule Take 2,000 Units by mouth daily.  1     No current facility-administered medications for this encounter.

## 2021-06-03 NOTE — TELEPHONE ENCOUNTER
Refills of Norco and switch to Ketamine PO per 11/4 MTM visit. Per request, Ketamine set up for [facility].     Next visit with Dr. Berry on 12/10       Juliet CabralD  Medication Therapy Management (MTM) Pharmacist  [facility]

## 2021-06-03 NOTE — TELEPHONE ENCOUNTER
Medication being requested: Flexeril  Last visit date: 10/23/19 Provider: BE  Next visit date: 12/10/19 Provider: CASI  Expected follow up: 6 weeks  MTM visit (Pain Center) date: 11/4/19  Pertinent between visit information about requested medication (telephone, mychart, prior authorization, concerns): 11/1/19 ED for ureteral stones and surgical removal  Last date prescribed: 11/4/19  Provider responsible: BE  Spoke with patient: darren  Script being sent to provider - dates and quantity: 12/4/19, Qty: 45  Requested Prescriptions     Pending Prescriptions Disp Refills     cyclobenzaprine (FLEXERIL) 10 MG tablet [Pharmacy Med Name: Cyclobenzaprine HCl Oral Tablet 10 MG] 45 tablet 0     Sig: Take 1 tablet (10 mg total) by mouth 3 (three) times a day as needed.     Pharmacy cued: [facility]

## 2021-06-04 VITALS
HEART RATE: 102 BPM | OXYGEN SATURATION: 98 % | HEIGHT: 65 IN | SYSTOLIC BLOOD PRESSURE: 128 MMHG | BODY MASS INDEX: 34.82 KG/M2 | DIASTOLIC BLOOD PRESSURE: 82 MMHG | WEIGHT: 209 LBS | RESPIRATION RATE: 12 BRPM

## 2021-06-04 VITALS — BODY MASS INDEX: 33.28 KG/M2 | HEIGHT: 65 IN

## 2021-06-04 VITALS — WEIGHT: 200 LBS | BODY MASS INDEX: 33.32 KG/M2 | HEIGHT: 65 IN

## 2021-06-04 VITALS — BODY MASS INDEX: 34.99 KG/M2 | HEIGHT: 65 IN | WEIGHT: 210 LBS

## 2021-06-04 VITALS — BODY MASS INDEX: 32.15 KG/M2 | HEIGHT: 65 IN | WEIGHT: 193 LBS

## 2021-06-04 NOTE — PATIENT INSTRUCTIONS - HE
PLAN:    Discussed other Magnesium formulations may be better absorbed::  Mg Glycinate, Mg Orotate, Mg Malate, titrate to cramping    Change Hydrocodone to Oxycodone 10 mg every four hours as needed    Ketamine increase to 60 mg lozenge one-half under tongue every four hours    Discussed Frequency Specific Microcurrent for bone pain, may contact [facility] [phone] or [facility] [phone]    For sweating Glycopyrolate  1mg three times a day, after one week may try 2 mg three times a day    Continue vit K2 with vitamin D    Return in 8 weeks

## 2021-06-04 NOTE — TELEPHONE ENCOUNTER
Appears there is a refill at the pharmacy for ketamine, there is also a remaining refill on this RX.  No refills should be necessary at this time.

## 2021-06-04 NOTE — TELEPHONE ENCOUNTER
"Patient calls, voicemail reporting only received 9 days supply of \"new RX\"  She will need a refill of flexeril and ketamine.  Call placed to patient:  Reviewed what is needed at this time.  Will cue as indicated, patient will also look into other options for compound pharmacies and get back to us with any preferred changes.  Oxycodone is the RX that patient received a 9 day supply- adjusted the quantity to reflect a 14 day supply.  Requested Prescriptions     Pending Prescriptions Disp Refills     cyclobenzaprine (FLEXERIL) 10 MG tablet [Pharmacy Med Name: Cyclobenzaprine HCl Oral Tablet 10 MG] 45 tablet 0     Sig: Take 1 tablet (10 mg total) by mouth 3 (three) times a day as needed.     ketamine HCl (KETAMINE, BULK,) 100 % Powd 90 Bottle 1     Si mg lozenge one-half every four hours as needed     oxyCODONE (ROXICODONE) 10 mg immediate release tablet 84 tablet 0     Sig: Take 1 tablet (10 mg total) by mouth every 4 (four) hours as needed for pain.     [facility]  [facility]  "

## 2021-06-04 NOTE — PROGRESS NOTES
Patient here for follow up appointment with Dr Berry for sternal, and bilateral shoulder pain. She also sates that she has severe muscle spasms in her entire body. Patient rates her pain a 7-8  With functional score of 8.

## 2021-06-04 NOTE — PROGRESS NOTES
Assessment/Plan:     Problem List Items Addressed This Visit     Other chronic pain - Primary    Relevant Medications    naloxone (NARCAN) 4 mg/actuation nasal spray      Other Visit Diagnoses     Chronic pain syndrome        Relevant Medications    glycopyrrolate (ROBINUL) 1 mg tablet            No follow-ups on file.    Patient Instructions   PLAN:    Discussed other Magnesium formulations may be better absorbed::  Mg Glycinate, Mg Orotate, Mg Malate, titrate to cramping    Change Hydrocodone to Oxycodone 10 mg every four hours as needed    Ketamine increase to 60 mg lozenge one-half under tongue every four hours    Discussed Frequency Specific Microcurrent for bone pain, may contact [facility] [phone] or [facility] [phone]    For sweating Glycopyrolate  1mg three times a day, after one week may try 2 mg three times a day    Continue vit K2 with vitamin D    Return in 8 weeks        Subjective:       59 y.o. female presents for mental pain symptoms felt related to tamoxifen to treat breast cancer.    Since last seen she did see our MTM pharmacist, has been adjusting ketamine hydromorphone.    She describes that she has been off tamoxifen, continues to have sweats through the day, sweating through her shirt.    Continues to have spasms, can occur in her hands and legs.  She has been increasing magnesium supplement agitation that seems to help.  She tries to get massages, sometimes those are a challenge.  She has been using different preparations of magnesium.    She has had a bone scan, the first 1 showed significant loss.  She will have another one tomorrow, concerned they only did her hand and leg, did not look into the sternum this time where it services had significant amount of pain.    She has been taking vitamin K2 supplementation, unclear if that has helped.    The combination of ketamine with hydro-codon is doing better than she had without it.  The hydrocodone lasts about 2-1/2 to 3  hours.  She continues taking more Tylenol.  She started with ketamine using 20 mg about every 4 hours.  No side effects.    She has been having headaches more recently, seen via the occipital area needing Imitrex.    She did have 1 session of myofascial release physical therapy in the past.    She was seen for optometry evaluation, concerned that her vision had changed, she wondered if is related to new medications and not thought likely.        Current Outpatient Medications:      calcium citrate-vitamin D3 (CITRACAL + D) 315 mg- 250 unit per tablet, Take 2 tablets by mouth 3 (three) times a day with meals., Disp: , Rfl:      cetirizine (ZYRTEC) 10 MG tablet, Take 10 mg by mouth daily as needed., Disp: , Rfl:      diazePAM (VALIUM) 5 MG tablet, Take 1 tablet by mouth as needed., Disp: , Rfl: 0     diclofenac sodium (VOLTAREN) 1 % Gel, Apply topically 4 (four) times a day., Disp: , Rfl:      fluticasone propionate (FLONASE) 50 mcg/actuation nasal spray, Apply 1 spray into each nostril daily., Disp: , Rfl:      furosemide (LASIX) 20 MG tablet, Take 20 mg by mouth 2 (two) times a day., Disp: , Rfl:      magnesium oxide (MAG-OX) 400 mg (241.3 mg magnesium) tablet, Take 1 tablet (400 mg total) by mouth daily., Disp: 30 tablet, Rfl: 1     montelukast (SINGULAIR) 10 mg tablet, Take 20 mg by mouth 2 (two) times a day., Disp: , Rfl:      potassium chloride (K-DUR,KLOR-CON) 20 MEQ tablet, Take 20 mEq by mouth 2 (two) times a day., Disp: , Rfl:      VITAMIN D3 2,000 unit capsule, Take 2,000 Units by mouth daily., Disp: , Rfl: 1     ciprofloxacin HCl (CIPRO) 250 MG tablet, Take 250 mg by mouth 2 (two) times a day., Disp: , Rfl:      cyclobenzaprine (FLEXERIL) 10 MG tablet, Take 1 tablet (10 mg total) by mouth 3 (three) times a day as needed., Disp: 45 tablet, Rfl: 0     glycopyrrolate (ROBINUL) 1 mg tablet, Take 1 tablet (1 mg total) by mouth 3 (three) times a day., Disp: 60 tablet, Rfl: 2     ketamine HCl (KETAMINE, BULK,)  "100 % Powd, 60 mg lozenge one-half every four hours as needed, Disp: 90 Bottle, Rfl: 1     naloxone (NARCAN) 4 mg/actuation nasal spray, 1 spray (4 mg dose) into one nostril for opioid reversal. Call 911. May repeat if no response in 3 minutes., Disp: 1 Box, Rfl: 0     [START ON 12/18/2019] oxyCODONE (ROXICODONE) 10 mg immediate release tablet, Take 1 tablet (10 mg total) by mouth every 4 (four) hours as needed for pain., Disp: 84 tablet, Rfl: 0     phenazopyridine (PYRIDIUM) 200 MG tablet, Take 200 mg by mouth., Disp: , Rfl:             Objective:     Vitals:    12/10/19 0818   BP: 138/78   Pulse: (!) 104   Weight: 193 lb (87.5 kg)   Height: 5' 5\" (1.651 m)   PainSc:   8   PainLoc: Shoulder       Steve: Discussed different magnesium formulations and may be better absorbed.    We will change hydrocodone to oxycodone 10 mg up to every 4 hours as needed.  This way she feels she can more closely control the amount of Tylenol she uses.    We will increase ketamine to 30 mg lozenge.    Her sweating described off label use of glycopyrrolate, possible side effects.    Reviewed the role of frequency specific microcurrent resources.    Time spent more than 25 minutes face-to-face, 50% counseling on above condition coordination treatment plan    This note has been dictated using voice recognition software. Any grammatical or context distortions are unintentional and inherent to the software  "

## 2021-06-04 NOTE — TELEPHONE ENCOUNTER
RN tried calling compound pharmacy to see what what was picked up and when.  The last prescription was sent on 11/26 and if the patient was using as directed and picked up the refill that was there as well patient should have supply until 12/11.  Line kept going to voicemail.  Will have to try calling again.

## 2021-06-05 VITALS — HEIGHT: 65 IN | BODY MASS INDEX: 36.15 KG/M2 | WEIGHT: 217 LBS

## 2021-06-05 VITALS — BODY MASS INDEX: 36.11 KG/M2 | HEIGHT: 65 IN

## 2021-06-05 NOTE — PATIENT INSTRUCTIONS - HE
PLAN:    Referral to Pulmonology    You are seeing Frequency Specific Microcurrent provider this week    If you have significant spasms again call the check labs    Ketamine 30 mg every four hours alternating with oxycodone 10 mg every  Four hours    Return in 8 weeks

## 2021-06-05 NOTE — PROGRESS NOTES
Patient is here for a follow up appointment with Dr. Berry. Patient has bilateral shoulder, ribs, sternum, back, bilateral pelvic, groin and clavicle pain, describes the pain as constant, deep bone ache, sharp in places, constricting and rates the pain as 8/10. Patient wants to talk about discuss scans and changes in her pain. Patient needs refills for Oxycodone, Ketamine, Magnesium and Flexeril. Functionality is 8.

## 2021-06-05 NOTE — TELEPHONE ENCOUNTER
Medication being requested: robinul  Last visit date: 12/10 Provider: CASI  Next visit date: 2/3 Provider: CASI  Expected follow up: 8 weeks  MTM visit (Pain Center) date: no  Pertinent between visit information about requested medication (telephone, mychart, prior authorization, concerns): none  Last date prescribed: 12/10  Provider responsible: BE  Spoke with patient: no  Script being sent to provider - dates and quantity:   Requested Prescriptions     Pending Prescriptions Disp Refills     glycopyrrolate (ROBINUL) 1 mg tablet [Pharmacy Med Name: Glycopyrrolate Oral Tablet 1 MG] 60 tablet 1     Sig: Take 1 tablet (1 mg total) by mouth 3 (three) times a day.     [facility] cued: [facility]

## 2021-06-05 NOTE — PROGRESS NOTES
"Assessment/Plan:     Problem List Items Addressed This Visit     Other chronic pain    Relevant Medications    oxyCODONE (ROXICODONE) 10 mg immediate release tablet (Start on 2/12/2020)    magnesium oxide (MAG-OX) 400 mg (241.3 mg magnesium) tablet    cyclobenzaprine (FLEXERIL) 10 MG tablet (Start on 2/7/2020)      Other Visit Diagnoses     Chronic pain syndrome        Relevant Medications    ketamine HCl (KETAMINE, BULK,) 100 % Powd (Start on 2/7/2020)    Other Relevant Orders    Ambulatory referral to Pulmonology            No follow-ups on file.    Patient Instructions   PLAN:    Referral to Pulmonology    You are seeing Frequency Specific Microcurrent provider this week    If you have significant spasms again call the check labs    Ketamine 30 mg every four hours alternating with oxycodone 10 mg every  Four hours    Return in 8 weeks        Subjective:       59 y.o. female presents for [facility] pain related to chemotherapy.    Reports some things have changed.  She describes a sense of \"marbles\" feeling in her groin area, under her axilla and clavicle.  She has had more breathing problems \"like someone stepping on my regulator\".    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  Lymphoma did x-ray which was negative.  She did not have any other issues to follow-up.  Patient is concerned as initial x-rays were negative for lymphoma, only when they were looking further for her thyroid condition that she find problems.    She saw the lymphoma for the breast cancer who did acknowledge that the tamoxifen may have been an issue.  She told her her osteoporosis does not seem to be a problem.  Spots on her CT are normal.  However the patient states in 2018 she did not have the spots.  Though she is not feeling well supported.    She did have significant problems with muscle cramping, 5 days after that he had a CK that was elevated at 3000.  She follows with her primary care provider.  It did then return to " baseline.  She is not on a statin.  She cannot identify a particular precipitants.    She has been taking magnesium supplementation since then that has helped.    We did change the hydrocodone to oxycodone taking 10 mg every 4 hours.  That seems to be doing better.  She continues with the ketamine, 30 mg every 4 hours and alternates them as above.  The ketamine does not seem to last dose to dose but between the 2 seems to be helpful for pain.    She did try glycopyrrolate for a couple of doses and that is helped the sweating.  She continues to find if she is active such as a long walk she will have sweating.  Her oncologist treated at the hormones, though that is not been the issue in the past.    Her thyroid labs are better as she is off the tamoxifen.     is reviewed, as expected.        Current Outpatient Medications:      calcium citrate-vitamin D3 (CITRACAL + D) 315 mg- 250 unit per tablet, Take 2 tablets by mouth 3 (three) times a day with meals., Disp: , Rfl:      cetirizine (ZYRTEC) 10 MG tablet, Take 10 mg by mouth daily as needed., Disp: , Rfl:      [START ON 2/7/2020] cyclobenzaprine (FLEXERIL) 10 MG tablet, Take 1 tablet (10 mg total) by mouth 3 (three) times a day., Disp: 90 tablet, Rfl: 0     diclofenac sodium (VOLTAREN) 1 % Gel, Apply topically 4 (four) times a day., Disp: , Rfl:      fluticasone propionate (FLONASE) 50 mcg/actuation nasal spray, Apply 1 spray into each nostril daily., Disp: , Rfl:      furosemide (LASIX) 20 MG tablet, Take 20 mg by mouth 2 (two) times a day., Disp: , Rfl:      [START ON 2/7/2020] ketamine HCl (KETAMINE, BULK,) 100 % Powd, 60 mg lozenge one-half every four hours as needed, Disp: 90 Bottle, Rfl: 3     magnesium oxide (MAG-OX) 400 mg (241.3 mg magnesium) tablet, Take 1 tablet (400 mg total) by mouth 3 (three) times a day., Disp: 90 tablet, Rfl: 0     montelukast (SINGULAIR) 10 mg tablet, Take 20 mg by mouth 2 (two) times a day., Disp: , Rfl:      naloxone (NARCAN) 4  mg/actuation nasal spray, 1 spray (4 mg dose) into one nostril for opioid reversal. Call 911. May repeat if no response in 3 minutes., Disp: 1 Box, Rfl: 0     [START ON 2/12/2020] oxyCODONE (ROXICODONE) 10 mg immediate release tablet, Take 1 tablet (10 mg total) by mouth every 4 (four) hours as needed for pain., Disp: 84 tablet, Rfl: 0     potassium chloride (K-DUR,KLOR-CON) 20 MEQ tablet, Take 20 mEq by mouth 2 (two) times a day., Disp: , Rfl:      VITAMIN D3 2,000 unit capsule, Take 5,000 Units by mouth daily. , Disp: , Rfl: 1     ciprofloxacin HCl (CIPRO) 250 MG tablet, Take 250 mg by mouth 2 (two) times a day., Disp: , Rfl:      diazePAM (VALIUM) 5 MG tablet, Take 1 tablet by mouth as needed., Disp: , Rfl: 0     glycopyrrolate (ROBINUL) 1 mg tablet, Take 1 tablet (1 mg total) by mouth 3 (three) times a day., Disp: 60 tablet, Rfl: 1     phenazopyridine (PYRIDIUM) 200 MG tablet, Take 200 mg by mouth., Disp: , Rfl:   Is alert with a clear sensorium good eye contact.  Thought process tight logical.  Affect is somewhat constricted if she reviews her concerns and frustrations.    On exam her lungs do sound clear, on her left lateral aspect I do hear some expiratory wheezing and she feels that is the area that feels more tight.          Objective:   There were no vitals filed for this visit.      Impression: Status post breast cancer, lymphoma, thyroid cancer.  She reports some relatively newer symptoms of pulmonary concerns and did not feel reassured that her more recent follow-up.    Regarding pain management, discontinuing the tamoxifen, use of oxycodone and ketamine seem to provide some benefit.  She is not having the opioid-induced hyperalgesia concerns have been raised at one time.    Plan: She will continue with the acupuncture and the myofascial release.  She is to start frequency specific microcurrent this week and we discussed their protocols for toxicity from chemotherapy.    We will make a referral to  pulmonology, she will determine provider that is convenient.    Time spent more than 15 minutes face-to-face, 50% counts about above condition coronation treatment plan          This note has been dictated using voice recognition software. Any grammatical or context distortions are unintentional and inherent to the software

## 2021-06-06 NOTE — PROGRESS NOTES
Pulmonary Clinic Consult Note  Date of Service: 3/9/2020    Reason for Consultation: abnormal CT scan    History:     HPI: Patient is a 58 yo F, lifelong nonsmoker, who was referred here for abnormal CT chest.     Patient has been following with pain management.  She had a CT scan due to complaints of chest pain and tightness in the neck and therefore at some point she had a CT scan back on 2019.  Her CT scan was notable for a 3 mm nodule in the lingula and subsegmental atelectasis.  There was also evidence of mild mosaic attenuation suggestive of small vessel or small airway disease.  Patient notes that she does have shortness of breath at times.  She was diagnosed with exercise-induced asthma many years ago, .  Does complain of pleuritic chest symptoms.  She did have a CTA at one time that was negative for PE.  She denies any hospitalizations for respiratory related illnesses.  She has an albuterol inhaler.  She denies any weight gain but endorses night sweats.  However patient has history of non-Hodgkin's lymphoma and she has been in remission since January.    She was diagnosed with non-Hodgkin's lymphoma, diffuse large B-cell lymphoma, back in  and underwent R-CHOP and intrathecal therapy at the [facility].    She notes that she has also had history of breast cancer for which she underwent mastectomy and breast reconstruction surgery.  She was on tamoxifen.    PMHx/PSHx:  H/o breast cancer s/p mastectomy on the right and breast reconstruction surgery. No chemo   DLBCL NHL dg of 2017 s/p R-CHOP and intrathecal MTX.. in remission since January  S/p gregory-en-Y surgery   Total abdominal hysteroctomy  Allergic rhinitis  Non-Hodgkin's lymphoma stage III  Morbid obesity  Chronic kidney disease  History of   Hysterectomy  Thyroidectomy  Tonsillectomy    Social Hx:    Lifelong non-smoker  Work: occupation health nurse at [employer]  Lives at home    Review of Systems - 10 point  "review of system negative except for what is mentioned in the HPI.    Meds and Allergies:  See EHR for the updated medication list and Allergies. These were reviewed.     Family History   Problem Relation Age of Onset     Cancer Mother      Hyperlipidemia Mother      Hypertension Mother      Diabetes Father      Heart disease Father      Cancer Brother      Depression Brother      Cancer Maternal Grandmother      Cancer Maternal Grandfather      Heart disease Paternal Grandmother      Hypertension Paternal Grandmother      Heart attack Paternal Grandmother      Diabetes Paternal Grandfather      Heart attack Paternal Grandfather        Exam/Data:   /82   Pulse (!) 102   Resp 12   Ht 5' 5\" (1.651 m)   Wt 209 lb (94.8 kg)   LMP  (LMP Unknown)   SpO2 98%   Breastfeeding No   BMI 34.78 kg/m      EXAM:  GEN: comfortable, NAD  HEENT: NCAT, EMOI, mmm  CVS: S1S2, RRR  Lung: good air entry bilaterally, no wheezing  Abd: soft, nt, + BS. No masses  Ext: no c/c/e  Vasc: intact radial pulses bilaterally  Neuro: nonfocal  Skin: no visible rash  Musculoskeletal: FROM all extremities  Psych: normal affect    DATA      PFT DATA 3./2020:  FEV1 97% predicted, FVC 90% predicted, ratio 85.  No reversibility.  TLC 90% predicted, RV 93% predicted, DLCO 145%.  Normal flow volume loop    CT chest dated 10/11/2019:  No pulmonary embolus  Possible new 3 mm pulmonary nodule in the lingula in the region of adjacent subsegmental atelectasis.  Continued attention is recommended on follow-up in this patient with history of malignancy  Mild mosaic attenuation throughout the lung suggestive of small vessel or small airway disease.    CT chest abdomen pelvis on February 6, 2020:  No evidence of recurrent, residual, or metastatic disease in the chest, abdomen, or pelvis.  Stable prominent but not enlarged pelvic lymph nodes.  Stable biliary dilatation  Resolution of previously demonstrated distal right ureteral stone.  Diverticulosis " without diverticulitis.    Assessment/Plan:   Tisha Arias is a 59 y.o. female, lifelong non-smoker, with history of diffuse large B-cell lymphoma status post R-CHOP, history of breast cancer status post mastectomy and tamoxifen therapy, chronic pain who was referred here for evaluation of abnormal CT scan of the chest which was done back on 10/20/2019.  Her CTA at the time showed no evidence of PE but however she had a 3 mm small nodule in the lingula.  There was also mild mosaic attenuation.  She did show me CT report that was done this year which showed no abnormalities.  Patient is a lifelong non-smoker.      Recommendations:  Consider repeat imaging at some point however patient does follow-up with oncology for her diffuse large B-cell lymphoma.  However, follow-up CT scan on February 2020 was unremarkable.  Consider inhaled steroids to see if patient's chest tightness complaints improve, although less likely given normal PFTs      FOLLOW UP: 4 months    Pauline Smith MD  Pulmonary and Critical Care Medicine  3/9/2020        Allergies   Allergen Reactions     Buprenorphine      Chest pain, GI, cramping     Cymbalta [Duloxetine] Anaphylaxis     Nuts - Unspecified Anaphylaxis     Ragweed Pollen Anaphylaxis     Topiramate Anaphylaxis     ataxia     Amitriptyline      seratonin syndrome      Baclofen Swelling     Codeine Nausea And Vomiting     Gabapentin Swelling     Hydroxyzine Headache     Cheat pressure, tremors, back pain     Lamotrigine Nausea And Vomiting and Headache     Fever, swollen glands, muscle pain, chest pain, bruising, neck stiffness, confusion     Lyrica [Pregabalin]      Metaxalone Nausea And Vomiting     Nortriptyline      Other Food Allergy      Melons, bananas, cucumber, zucchini, potatoes, carrots, cherries, celery, apples, pears, plums, peaches, parsnips, kiwi, hazelnuts, apricots     Tree And Shrub Pollen      Birch tree     Buprenorphine-Naloxone Itching, Swelling and Anxiety      chest pain, fatigue     Clonidine Anxiety     tremors       Medications:     Current Outpatient Medications   Medication Sig Dispense Refill     albuterol (PROAIR HFA;PROVENTIL HFA;VENTOLIN HFA) 90 mcg/actuation inhaler Inhale 2 puffs every 6 (six) hours as needed for wheezing.       calcium citrate-vitamin D3 (CITRACAL + D) 315 mg- 250 unit per tablet Take 2 tablets by mouth 3 (three) times a day with meals.       cetirizine (ZYRTEC) 10 MG tablet Take 10 mg by mouth daily as needed.       ciprofloxacin HCl (CIPRO) 250 MG tablet Take 250 mg by mouth 2 (two) times a day.       diazePAM (VALIUM) 5 MG tablet Take 1 tablet by mouth as needed.  0     diclofenac sodium (VOLTAREN) 1 % Gel Apply topically 4 (four) times a day.       fluticasone propionate (FLONASE) 50 mcg/actuation nasal spray Apply 1 spray into each nostril daily.       furosemide (LASIX) 20 MG tablet Take 20 mg by mouth 2 (two) times a day.       glycopyrrolate (ROBINUL) 1 mg tablet Take 1 tablet (1 mg total) by mouth 3 (three) times a day. 60 tablet 1     levothyroxine (SYNTHROID, LEVOTHROID) 112 MCG tablet Take 112 mcg by mouth 2 (two) times a day.       magnesium oxide (MAG-OX) 400 mg (241.3 mg magnesium) tablet Take 1 tablet (400 mg total) by mouth 3 (three) times a day. 90 tablet 0     montelukast (SINGULAIR) 10 mg tablet Take 20 mg by mouth 2 (two) times a day.       naloxone (NARCAN) 4 mg/actuation nasal spray 1 spray (4 mg dose) into one nostril for opioid reversal. Call 911. May repeat if no response in 3 minutes. 1 Box 0     [START ON 3/11/2020] oxyCODONE (ROXICODONE) 10 mg immediate release tablet Take 1 tablet (10 mg total) by mouth every 4 (four) hours as needed for pain. 84 tablet 0     phenazopyridine (PYRIDIUM) 200 MG tablet Take 200 mg by mouth.       potassium chloride (K-DUR,KLOR-CON) 20 MEQ tablet Take 20 mEq by mouth 2 (two) times a day.       VITAMIN D3 2,000 unit capsule Take 5,000 Units by mouth daily.   1     No current  facility-administered medications for this visit.        Much or all of the text in this note was generated through the use of the Dragon Dictate voice-to-text software. Errors in spelling or words which seem out of context are unintentional. Sound alike errors, in particular, may have escaped editing.

## 2021-06-06 NOTE — TELEPHONE ENCOUNTER
Medication being requested: Flexeril  Last visit date: 2-3-2020 Provider: CASI  Next visit date: 4/10/2020  Provider: CASI  Expected follow up: 8 weeks  MTM visit (Pain Center) date: n/a  Pertinent between visit information about requested medication (telephone, mychart, prior authorization, concerns): n/a  Last date prescribed: 2/7/2020  Provider responsible: BE  Spoke with patient: no  Script being sent to provider - dates and quantity: Flexeril; 90 tabs for 30 days  Pharmacy cued: [facility] [place]

## 2021-06-06 NOTE — TELEPHONE ENCOUNTER
Medication being requested: Magnesium   Last visit date: 3/2/20 Provider: BE  Next visit date: 4/10/20 Provider: CASI  Expected follow up: 8 weeks  MTM visit (Pain Center) date: Not done since last visit  Pertinent between visit information about requested medication (telephone, mychart, prior authorization, concerns):     02/19/20 - refilled the oxycodone     Last date prescribed: 2/3/20  Provider responsible: BE  Spoke with patient: N/A  Script being sent to provider - dates and quantity:   Requested Prescriptions     Pending Prescriptions Disp Refills     magnesium oxide (MAG-OX) 400 mg (241.3 mg magnesium) tablet [Pharmacy Med Name: Magnesium Oxide Oral Tablet 400 MG] 90 tablet 0     Sig: Take 1 tablet (400 mg total) by mouth 3 (three) times a day.       Pharmacy cued: [place] [facility]

## 2021-06-06 NOTE — TELEPHONE ENCOUNTER
I spoke with Carina, pharmacist at [facility] in [place], patients rx Magnesium Oxide is waiting for her to .

## 2021-06-06 NOTE — TELEPHONE ENCOUNTER
Calls to request refill of oxycodone and also states that things are not working and would like a return call.  I attempted call the patient, telephone number listed as just rings without a message machine

## 2021-06-07 NOTE — TELEPHONE ENCOUNTER
RN returned call to patient  Per Dr. Berry's suggestions.  Patient will call insurance to see if Xtampza is covered.  Patient would like provider to consider increasing the dosage of the Oxycodone to reflect what would have been taken with the Oxycontin.    Dr. Berry please advise.

## 2021-06-07 NOTE — PATIENT INSTRUCTIONS - HE
Plan:    [facility] to evaluate lower extremity swelling and cramping.    You will contact your primary care provider and endocrinologist for ongoing assessment of your lower extremity edema.    Change the oxycodone 10 mg 6 times a day to OxyContin 20 mg 3 times a day to provide better coverage.    May obtain PEA Palmatoyl Ethanolamide through [email or web address]    Discussed use of frequency specific microcurrent when able to be seen again in the pain center.    Schedule follow-up again in several weeks.   165.1

## 2021-06-07 NOTE — TELEPHONE ENCOUNTER
"Patient left message on RN line for provider Dr. Berry with update in status.  States she has \"been on oxycodone 10 and oxycodone 20mg 3xd.\" \" treatment trial for one month now\" \"it is not working\"  \"with increased activity pain in chest, upper back and wraps around\" \"abdomen is tight upper right quadrant\" \"i'm trying to get back to work\"  Review of chart shows a med management dated 4/17.   Last OV: 4/10  "

## 2021-06-07 NOTE — TELEPHONE ENCOUNTER
Patient reports changing to the oxycodone 20 mg is not providing adequate coverage.  She is quite uncomfortable.  Indeed sounds when the telephone.  Reviewing the record she had been on MS Contin in the past with oxycodone.  Begin transitioning over the morphine.  We will start with 15 mg 4 times a day short acting and then transition to long-acting

## 2021-06-07 NOTE — PROGRESS NOTES
"Assessment/Plan:     Problem List Items Addressed This Visit     Other chronic pain    Relevant Medications    magnesium oxide (MAG-OX) 400 mg (241.3 mg magnesium) tablet (Start on 5/3/2020)    cyclobenzaprine (FLEXERIL) 10 MG tablet    oxyCODONE (OXYCONTIN) 20 mg 12 hr tablet    Other Relevant Orders    Comprehensive Metabolic Panel    CK Total    Magnesium, Red Blood Cell              No follow-ups on file.    Patient Instructions   Plan:    [facility] to evaluate lower extremity swelling and cramping.    You will contact your primary care provider and endocrinologist for ongoing assessment of your lower extremity edema.    Change the oxycodone 10 mg 6 times a day to OxyContin 20 mg 3 times a day to provide better coverage.    Discussed use of frequency specific microcurrent when able to be seen again in the pain center.    Schedule follow-up again in several weeks.        Subjective:       59 y.o. Tisha Arias is a 59 y.o. female who is being evaluated via a billable video visit.      The patient has been notified of following:     \"This video visit will be conducted via a call between you and your physician/provider. We have found that certain health care needs can be provided without the need for an in-person physical exam.  This service lets us provide the care you need with a video conversation.  If a prescription is necessary we can send it directly to your pharmacy.  If lab work is needed we can place an order for that and you can then stop by our lab to have the test done at a later time.    Video visits are billed at different rates depending on your insurance coverage. Please reach out to your insurance provider with any questions.    If during the course of the call the physician/provider feels a video visit is not appropriate, you will not be charged for this service.\"    Patient has given verbal consent to a Video visit? yes    Patient would like to receive their AVS by My Chart    Patient " "would like the video invitation sent by: Dr. Berry        Additional provider notes: There have been several communications regarding cramping, adjustments of magnesium.    Reviewed she had gone to the emergency room one time with elevated CK.    She describes today a trial of baclofen seem to cause increased sensation of tightness in her chest, swelling in face and eyes.  It is beginning to resolve.    She finds if she does a balance of magnesium supplementation, may help with some of the cramping.  Higher dose causes loose stools.    She continues with lower extremity edema, on Lasix 40 mg followed by oncology than endocrinology.  Her legs are still \"4+\", and the feet are feeling discolored.  Has not had recent electrolytes.    Notes the cramping also involves her chest, abdomen, arms, does not just correlate with the lower extremity edema.    She did have an appointment the pulmonology, found her pulmonary function tests were acceptable, and did not comment on the lower extremity edema.    She did have acupuncture with the frequency specific microcurrent for 6-8 sessions.  She found it seemed to flareup her pain for 4 hours afterwards, then seemed to improve the pain for a while.  She is not clear if there were frequency specific to target the chemotherapy toxicity.    She continues using the PEA which she finds had been helpful at 3 times a day.  She has now run out of that dose.    Has been using oxycodone 10 mg every 4 hours, does not last the time in between doses, and ketamine 60 mg alternating in between as well.  She finds the fluctuation in the level frustrating.    She did feel the Flexeril did better than the baclofen.      Current Outpatient Medications:      albuterol (PROAIR HFA;PROVENTIL HFA;VENTOLIN HFA) 90 mcg/actuation inhaler, Inhale 2 puffs every 6 (six) hours as needed for wheezing., Disp: , Rfl:      calcium citrate-vitamin D3 (CITRACAL + D) 315 mg- 250 unit per tablet, Take 2 tablets by " mouth 3 (three) times a day with meals., Disp: , Rfl:      cetirizine (ZYRTEC) 10 MG tablet, Take 10 mg by mouth daily as needed., Disp: , Rfl:      diazePAM (VALIUM) 5 MG tablet, Take 1 tablet by mouth as needed., Disp: , Rfl: 0     diclofenac sodium (VOLTAREN) 1 % Gel, Apply topically 4 (four) times a day., Disp: , Rfl:      fluticasone propionate (FLONASE) 50 mcg/actuation nasal spray, Apply 1 spray into each nostril daily., Disp: , Rfl:      furosemide (LASIX) 20 MG tablet, Take 20 mg by mouth 2 (two) times a day., Disp: , Rfl:      levothyroxine (SYNTHROID, LEVOTHROID) 112 MCG tablet, Take 112 mcg by mouth 2 (two) times a day., Disp: , Rfl:      montelukast (SINGULAIR) 10 mg tablet, Take 20 mg by mouth 2 (two) times a day., Disp: , Rfl:      naloxone (NARCAN) 4 mg/actuation nasal spray, 1 spray (4 mg dose) into one nostril for opioid reversal. Call 911. May repeat if no response in 3 minutes., Disp: 1 Box, Rfl: 0     oxyCODONE (ROXICODONE) 10 mg immediate release tablet, Take 1 tablet (10 mg total) by mouth every 4 (four) hours as needed for pain., Disp: 84 tablet, Rfl: 0     potassium chloride (K-DUR,KLOR-CON) 20 MEQ tablet, Take 20 mEq by mouth 2 (two) times a day., Disp: , Rfl:      SUMAtriptan (IMITREX) 50 MG tablet, , Disp: , Rfl:      VITAMIN D3 2,000 unit capsule, Take 5,000 Units by mouth daily. , Disp: , Rfl: 1     cyclobenzaprine (FLEXERIL) 10 MG tablet, Take 1 tablet (10 mg total) by mouth 3 (three) times a day. Fill 3-, Disp: 90 tablet, Rfl: 0     [START ON 5/3/2020] magnesium oxide (MAG-OX) 400 mg (241.3 mg magnesium) tablet, Take 1 tablet (400 mg total) by mouth 3 (three) times a day., Disp: 90 tablet, Rfl: 0     oxyCODONE (OXYCONTIN) 20 mg 12 hr tablet, Take 1 tablet (20 mg total) by mouth 3 (three) times a day., Disp: 42 each, Rfl: 0  Clear sensorium good eye contact.  Thought process tight logical.  Affect is congruent     is reviewed, as expected.    Impression: Chronic pain appears  related to toxicity from chemotherapy with tamoxifen.    Concern lower extremity edema continues to be significant concern and has not appeared to stabilize with diuretics.  She notes full body cramping, some benefits with magnesium though is loose stools.    Plan, will change the oxycodone to OxyContin 20 mg 3 times a day to stabilize blood levels.    Discussed resources to resume the PEA.    We will obtain laboratories including electrolytes, CK, and red blood cell magnesium.  She will contact her medical providers to reassess the lower extremity edema.    Continue with the ketamine as above.    30 of time more than 25 minutes, more than 50% counseling about above condition coronation treatment plan      Video-Visit Details    Type of service:  Video Visit    Originating Location (pt. Location): Home    Distant Location (provider location):  [facility]     Mode of Communication:  Video Conference doximety                     Objective:     Vitals:    04/10/20 0841   PainSc: 10-Worst pain ever                 This note has been dictated using voice recognition software. Any grammatical or context distortions are unintentional and inherent to the software

## 2021-06-07 NOTE — TELEPHONE ENCOUNTER
"Patient left a message stating that the OxyContin is $400/month and cannot afford it.  Patient would like to know \"which narcotic are you going to give me instead\".  Patient is requesting a call back from the provider to discuss.    Dr. Berry, please advise.  "

## 2021-06-07 NOTE — TELEPHONE ENCOUNTER
"Patient left a message this am. She states, that, Dr Berry told her to call, and is requesting a call back. She is complaining of \"muscle contractures\".   "

## 2021-06-07 NOTE — TELEPHONE ENCOUNTER
The patient's call reporting increasing cramps.  She reviews is been a problem over the last few months intermittently, worse over the last week and a half.  She had gone to the lab once before with elevated CK without explanation.    She is taking Lasix, though takes magnesium as much as she can tolerate to GI effects, and potassium replacement.  Reviewing the record labs in November were normal.    She takes diazepam occasionally which helps a little.  Robaxin makes it worse.  Flexeril perhaps somewhat helpful.    Oxycodone is 10 mg every 4 hours but not particular helpful for this.    I inquired about baclofen.  She had been on this previously to help with fibromyalgia type symptoms, with gabapentin and other agents.  Those were stopped all at the same time when there was concern for serotonin syndrome.  Discussed I did not worry about baclofen in the serotonin syndrome.  She thought it might of been somewhat helpful at that time.    We will add back baclofen 10 mg 3 times a day for a few days, 2 tablets 3 times a day if needed.  If she does need to go in the medical system will get some electrolytes checked.

## 2021-06-07 NOTE — TELEPHONE ENCOUNTER
Medication being requested: mag oxide 400 mg  Last visit date: 4/10 Provider: BE  Next visit date: 5/26 Provider: CASI  Expected follow up: several weeks  MTM visit (Pain Center) date: no  Pertinent between visit information about requested medication (telephone, mychart, prior authorization, concerns): 4/17-encounter regarding issues with cost of medication  Last date prescribed: 5/3  Already sent to pharmacy, no refills necessary at this time

## 2021-06-08 NOTE — PROGRESS NOTES
"Assessment/Plan:     Problem List Items Addressed This Visit     Other chronic pain    Relevant Medications    HYDROmorphone (DILAUDID) 4 MG tablet    cyclobenzaprine (FLEXERIL) 10 MG tablet    ketamine HCl (KETAMINE, BULK,) 100 % Powd            No follow-ups on file.    Patient Instructions   PLAN:   mg capsules twice a day from the [facility] pharmacy.    Discussed the supplement MitoCore for nerve support, 2 capsules twice a day, may obtain from [facility] pharmacy or online with orthomolecular labs.    Vitamin B6 50 mg daily.    Continue with the ketamine 60 mg lozenges 1/2 lozenge up to 6 times a day through [facility] pharmacy.    Continue with the magnesium supplementation for cramping as you are.    Continue the hydromorphone 4 mg tablets every 3 hours as needed, with flareups may use 2 tablets at a time.    Discussed will resume frequency specific microcurrent with Jane Hugo in July.    May see Dr. Berry in June when the program for frequency specific medical care is established.    Follow-up with Dr. Berry in 8 weeks.        Subjective:       59 y.o. The patient has been notified of the following:      \"We have found that certain health care needs can be provided without the need for a face to face visit.  This service lets us provide the care you need with a video conversation.       I will have full access to your [facility] medical record during this entire videocall.   I will be taking notes for your medical record.      Since this is like an office visit, we will bill your insurance company for this service.       There are potential benefits and risks of videi visits (e.g. limits to patient confidentiality) that differ from in-person visits.?  Confidentiality still applies for telephone services, and nobody will record the visit.  It is important to be in a quiet, private space that is free of distractions (including cell phone or other devices) during the visit.??      If during the " "course of the call I believe a telephone visit is not appropriate, you will not be charged for this service\"     Consent has been obtained for this service by care team member: Yes  Tisha Arias is a 59 y.o. female who is being evaluated via a billable video visit.      The patient has been notified of following:     \"This video visit will be conducted via a call between you and your physician/provider. We have found that certain health care needs can be provided without the need for an in-person physical exam.  This service lets us provide the care you need with a video conversation.  If a prescription is necessary we can send it directly to your pharmacy.  If lab work is needed we can place an order for that and you can then stop by our lab to have the test done at a later time.    Video visits are billed at different rates depending on your insurance coverage. Please reach out to your insurance provider with any questions.    If during the course of the call the physician/provider feels a video visit is not appropriate, you will not be charged for this service.\"    Patient has given verbal consent to a Video visit?     Patient would like to receive their AVS by mail  Patient would like the video invitation sent by: Dr. Berry    Will anyone else be joining your video visit? No        Video Start Time: 8:40    Additional provider notes:       Video-Visit Details    Type of service:  Video Visit    Video End Time (time video stopped): 8:58  Originating Location (pt. Location): Home    Distant Location (provider location):  [facility]     Platform used for Video Visit: doximety          Been several telephone calls in the interim to adjust medications.  Morphine caused headache and nausea and she recalled that from the past.  OxyContin was extremely expensive, oxycodone 20 mg not effective.  As such we transition to hydromorphone 2 mg every 4 hours.  That had some benefit though still " "significant pain.  She reviews today changing to 4 mg as much better than the other meds.  There still   Has several telephone calls to adjust medications.  The morphine caused occipital headaches and nausea which she recalled previously.  Thcan be times of significant pain.  With each dose there may be benefit for 1 to 2 hours, then depending on her activity, there can be more pain that can be hard to \"catch up\".    Notes pain in her hip sockets of pelvic girdle into her legs.  Can move into her ribs.    Still has the lower extremity swelling.  Shares is with her primary care provider and her hematologist the date did not have any suggestions aside from continue with the Lasix.    She had been using the PEA supplementation, has run out.  Reviewed possibly helpful.    Has been using ketamine 60 mg 1/2 lozenge every 4 hours which is somewhat helpful, perhaps for sleep as well as pain.    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    Continues with some of the cramping bedtime, uses magnesium supplementation sometimes with benefit.    Reviewed when she did the frequency specific microcurrent, wondering whether there was any approaches to chemotherapeutic toxicity.  She does not think so.      Current Outpatient Medications:      albuterol (PROAIR HFA;PROVENTIL HFA;VENTOLIN HFA) 90 mcg/actuation inhaler, Inhale 2 puffs every 6 (six) hours as needed for wheezing., Disp: , Rfl:      calcium citrate-vitamin D3 (CITRACAL + D) 315 mg- 250 unit per tablet, Take 2 tablets by mouth 3 (three) times a day with meals., Disp: , Rfl:      cetirizine (ZYRTEC) 10 MG tablet, Take 10 mg by mouth daily as needed., Disp: , Rfl:      cyclobenzaprine (FLEXERIL) 10 MG tablet, Take 1 tablet (10 mg total) by mouth 2 (two) times a day as needed for muscle spasms., Disp: 90 tablet, Rfl: 0     diazePAM (VALIUM) 5 MG tablet, Take 1 tablet by mouth as needed., Disp: , Rfl: 0     " diclofenac sodium (VOLTAREN) 1 % Gel, Apply topically 4 (four) times a day., Disp: , Rfl:      fluticasone propionate (FLONASE) 50 mcg/actuation nasal spray, Apply 1 spray into each nostril daily., Disp: , Rfl:      furosemide (LASIX) 20 MG tablet, Take 20 mg by mouth 2 (two) times a day., Disp: , Rfl:      HYDROmorphone (DILAUDID) 4 MG tablet, Take 1 tablet (4 mg total) by mouth every 3 (three) hours as needed for pain., Disp: 80 tablet, Rfl: 0     ketamine HCl (KETAMINE, BULK, MISC), Use As Directed., Disp: , Rfl:      levothyroxine (SYNTHROID, LEVOTHROID) 112 MCG tablet, Take 112 mcg by mouth 2 (two) times a day., Disp: , Rfl:      magnesium oxide (MAG-OX) 400 mg (241.3 mg magnesium) tablet, Take 1 tablet (400 mg total) by mouth 3 (three) times a day., Disp: 90 tablet, Rfl: 0     montelukast (SINGULAIR) 10 mg tablet, Take 20 mg by mouth 2 (two) times a day., Disp: , Rfl:      naloxone (NARCAN) 4 mg/actuation nasal spray, 1 spray (4 mg dose) into one nostril for opioid reversal. Call 911. May repeat if no response in 3 minutes., Disp: 1 Box, Rfl: 0     potassium chloride (K-DUR,KLOR-CON) 20 MEQ tablet, Take 20 mEq by mouth 2 (two) times a day., Disp: , Rfl:      SUMAtriptan (IMITREX) 50 MG tablet, Take 50 mg by mouth every 2 (two) hours as needed. , Disp: , Rfl:      VITAMIN D3 2,000 unit capsule, Take 5,000 Units by mouth daily. , Disp: , Rfl: 1     ketamine HCl (KETAMINE, BULK,) 100 % Powd, 60 mg lozenge one-half lozenge six times a day as needed, Disp: 60 Bottle, Rfl: 2     oxyCODONE (OXYCONTIN) 20 mg 12 hr tablet, Take 1 tablet by mouth every 4 (four) hours as needed., Disp: , Rfl:       By video appears alert, clear sensorium.  Thought process tight logical.  Appropriately groomed.  Affect is congruent.    No respiratory distress, no significant pain behavior.    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    Medication  "management with opioids have been suboptimal.    Plan: Continue the ketamine.    We will resume the PEA for which there had been some benefit using higher doses 600 mg twice a day.    Reviewed some supplementation for recovery in the nervous system including pyridoxine and supplement mitral core.     was reviewed as expected.     total time more than 25 minutes.                 Objective:     Vitals:    05/26/20 0839   Weight: 210 lb (95.3 kg)   Height: 5' 5\" (1.651 m)   PainSc:   9   PainLoc: Hip                 This note has been dictated using voice recognition software. Any grammatical or context distortions are unintentional and inherent to the software  "

## 2021-06-08 NOTE — TELEPHONE ENCOUNTER
"Patient calling back, message is quite short and pressured, \" I will need another refill and dont need to be out of my meds again like last time\"  Will send to provider as this patient is typically quite rude to nursing staff and requests not to speak with anyone other than Dr. Berry and that he will get her whatever she needs.    "

## 2021-06-08 NOTE — TELEPHONE ENCOUNTER
[place] pharmacy calls, request to adjust the quantity on ketamine to #90 which was what had previously been ordered and would be appropriate as to directions of use.  Requested Prescriptions     Pending Prescriptions Disp Refills     ketamine HCl (KETAMINE, BULK,) 100 % Powd 90 Bottle 2     Si mg lozenge one-half lozenge six times a day as needed     [facility]

## 2021-06-08 NOTE — TELEPHONE ENCOUNTER
2020 Prior Authorization Request  Who's requesting PA: Pharmacy  Provider: Jamal  Pharmacy Name, Location & Phone # : [facility]/[phone]  Medication Name: hydromorphone 4mg, 16/day  Quantity: 160  Refills: 0  Days Supply: 10  Medication Instructions: one to two tabs every 3 hrs  Insurance Plan: [employer]  Insurance Member ID & GRP # : [id number]  CoverMyMeds Key:  Informed patient that prior authorizations can take up to 10 business days for response: YES  Okay to leave a detailed message: NO    Route to: HE PA MED (27041)

## 2021-06-08 NOTE — TELEPHONE ENCOUNTER
Pharmacist Carina left a message requesting approval for patient to pay cash for medication because patient's insurance will only pay for a max of 12/day and the Rx was for 16/day.    Dr. Berry, please let me know if this is ok for patient to pay cash.

## 2021-06-08 NOTE — TELEPHONE ENCOUNTER
Caruso calls to report using the hydromorphone 4 mg tablets takes 1, sometimes after 2 hours needs to take another, is fine he has taken up until 12 in a day.  This is probably been the best opioid aside the oxycodone for which she required very high doses.  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    She is looking forward to other interventions such as frequency specific microcurrent.  She tolerates this without side effects.    We will extend to 2-week intervals.  Reviewed concerns with pharmacies and available supplies.  Reports she did speak with the [facility] pharmacy manager who is aware of this as an ongoing prescription

## 2021-06-08 NOTE — TELEPHONE ENCOUNTER
"With Dr. Berry's approval to pay cash RN called pharmacy to let them know.    Carina, pharmacists also is requesting a more definitive diagnosis code rather than \"Chronic pain\" per her superior.  Pharmacist states they need to put another diagnosis code for auditing purposes when a patient picks up a controlled substance with cash. Per the pharmacist the patient told them it was \"cancer related\".    Dr. Berry, please advise.  "

## 2021-06-08 NOTE — TELEPHONE ENCOUNTER
Per Dr. 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    RN called pharmacy back to relay the information.

## 2021-06-08 NOTE — PATIENT INSTRUCTIONS - HE
PLAN:   mg capsules twice a day from the [facility] pharmacy.    Discussed the supplement MitoCore for nerve support, 2 capsules twice a day, may obtain from [facility] pharmacy or online with orthomolecular labs.    Vitamin B6 50 mg daily.    Continue with the ketamine 60 mg lozenges 1/2 lozenge up to 6 times a day through [facility] pharmacy.    Continue with the magnesium supplementation for cramping as you are.    Continue the hydromorphone 4 mg tablets every 3 hours as needed, with flareups may use 2 tablets at a time.    Discussed will resume frequency specific microcurrent with Jane Hugo in July.    May see Dr. Berry in June when the program for frequency specific medical care is established.    Follow-up with Dr. Berry in 8 weeks.

## 2021-06-08 NOTE — TELEPHONE ENCOUNTER
Per the [employer] PA Rep that I spoke with, the rejection that the pharmacy is getting is: 88 DUR Reject error. The pharmacy would need to call the Pharmacy Help Desk [phone].    Central PA team  [phone]  Pool: HE PA MED ([id number])          PA has been initiated.       PA form completed and faxed insurance via Cover My Meds     Huang:  ARCELIA LOVETT (Key: [id number])     Medication:  Hydromorphone 4 mg    Insurance:  [employer]        Response will be received via fax and may take up to 5-10 business days depending on plan

## 2021-06-08 NOTE — TELEPHONE ENCOUNTER
Patient calls for refill of the hydromorphone.  Review she has been doing 2 mg tablets 2 tablets every 3 hours.  She reports it is better than it was in terms of pain control, and a sense of best so far.  It could be higher but she is excepted where we are presently.    I reviewed how she did with long-acting forms of medications in case will use hot long-acting hydromorphone.  She reports that does not do well.  She reported adequate response to oxycodone but did not respond to OxyContin and similar to oxymorphone.  Presently she has been using ketamine between doses every 4-6 hours though not as helpful.  She mentioned she was on diazepam for several years which did better though she has been off that for a year and a half.    Inquired if she had done the frequency specific microcurrent specific settings to remove chemotherapy toxins.  She did not think so but she will call that practitioner to see if that was the case.  If not we may offer that here as well.    The plan is to use hydromorphone 4 mg tablets every 3 hours, 1 week supply, with a schedule appointment next week

## 2021-06-08 NOTE — TELEPHONE ENCOUNTER
Caruso calls to report with the addition of morphine she has been having occipital headaches and nausea.  She recognizes now she had this before when she was on the morphine with the oxycodone but did not associate the 2 where she was on other meds.  She describes since the chemotherapy her body has different reactions to medication.  She was able to tolerate hydromorphone after surgery but again was before the chemotherapy.  We discussed having this is a trial to help augment the oxycodone.  She was using a lot of Imitrex with the morphine and is another reason to make changes.  We will try hydromorphone 2 mg 3 times a day as needed for a week

## 2021-06-08 NOTE — TELEPHONE ENCOUNTER
More helpful initially.  She was taking it 2 mg every 3 hours as needed as we did discuss.  Look like the prescription on record was 2 mg 3 times a day which would have been a mistake.  We discussed that there is a long-acting form that is quite expensive.  We will have a plan of tried 1 to 2 tablets every 3 hours as needed.  She note there were times that she might not needed that frequent other times was more severe would need to try the 2 tablets.  We will refill this through the weekend and reassess next week.See telephone call, the hydromorphone,

## 2021-06-08 NOTE — TELEPHONE ENCOUNTER
Patient calls, voicemail regarding morphine making her muscles sore, patient will also need refills but is asking to have additional or stronger pain medication.  This writer has not taken a phone call from this patient since becoming a patient whereby she is not asking for a strength increase or stronger medication.  It seems the medication plan of care is not effective and this will need to be reviewed by the provider.  Did not cue any refills at this time in the even the plan of care changes. There are no medications due within the next couple of days.

## 2021-06-08 NOTE — TELEPHONE ENCOUNTER
"Patient left message stating that the \"Dilaudid is not working at all\" she said it doesn't even last an hour. Also stated for change or increase in medication should be sent to [facility] for employees.  Rx filled last 5/13, one week supply.    "

## 2021-06-08 NOTE — TELEPHONE ENCOUNTER
"Another status update from patient: request for more dilaudid, \"The current dialudid has helped some but I am still needing more for pain\"  Patient asking to get another refill of dilaudid.  "

## 2021-06-09 NOTE — TELEPHONE ENCOUNTER
Patient calls, apparently there are issues at the pharmacy whereby they are not able to dispense the full quantity of her hydromorphone 4 mg.  Call placed to pharmacy:  Reviewed with pharmacist potential PA and that this was not necessary.  Pharmacist is able to get refill to go through for fill tomorrow and will prepare for patient and notify.  My chart also sent to patient notifying of this.

## 2021-06-09 NOTE — PROGRESS NOTES
"Tisha Arias is a 59 y.o. female who is being evaluated via a billable video visit.      The patient has been notified of following:     \"This video visit will be conducted via a call between you and your physician/provider. We have found that certain health care needs can be provided without the need for an in-person physical exam.  This service lets us provide the care you need with a video conversation.  If a prescription is necessary we can send it directly to your pharmacy.  If lab work is needed we can place an order for that and you can then stop by our lab to have the test done at a later time.    Video visits are billed at different rates depending on your insurance coverage. Please reach out to your insurance provider with any questions.    If during the course of the call the physician/provider feels a video visit is not appropriate, you will not be charged for this service.\"    Patient has given verbal consent to a Video visit? Yes  How would you like to obtain your AVS? AVS Preference: Zealifyhart.  Patient would like the video invitation sent by: Text to cell phone: [phone]   Will anyone else be joining your video visit? No        Video Start Time: 11:40 AM      Video-Visit Details    Type of service:  Video Visit    Video End Time (time video stopped): 11:46 AM  Originating Location (pt. Location): Home    Distant Location (provider location):  [facility]     Platform used for Video Visit: [facility]    Pulmonary Clinic Consult Note - virtual visit  Date of Service: 7/9/2020    Reason for Consultation: abnormal CT scan    History:     HPI: Patient is a 58 yo F, lifelong nonsmoker, with history of diffuse large B-cell lymphoma status post R-CHOP, history of breast cancer status post mastectomy and tamoxifen therapy, chronic pain who was referred here for evaluation of abnormal CT scan of the chest which was done back on 10/20/2019.  Her CTA at the time showed no evidence of PE but " however she had a 3 mm small nodule in the lingula.  There was also mild mosaic attenuation. She did have a CT chest on 2020 (which we did not have) that was reportedly normal. She did have normal PFT's.     She was diagnosed with non-Hodgkin's lymphoma, diffuse large B-cell lymphoma, back in  and underwent R-CHOP and intrathecal therapy at the [facility]. She has been in remission since 2018:    She notes that she has also had history of breast cancer for which she underwent mastectomy and breast reconstruction surgery.  She was on tamoxifen.    Pt denies any cough although she reports occasional sob. She also complains of chest tightness. She notes that she lost about 10 lbs with diet. She diets and exercise. She is not on any inhalers. She has albuterol inhaler. She sporadically uses it, 2-3 times a month.  No hospitalizations, abx/steroids since last seen.    PMHx/PSHx:  H/o breast cancer s/p mastectomy on the right and breast reconstruction surgery. No chemo   DLBCL NHL dg of  s/p R-CHOP and intrathecal MTX.. in remission since January  S/p gregory-en-Y surgery   Total abdominal hysteroctomy  Allergic rhinitis  Non-Hodgkin's lymphoma stage III  Morbid obesity  Chronic kidney disease  History of   Hysterectomy  Thyroidectomy  Tonsillectomy    Social Hx:    Lifelong non-smoker  Work: occupation health nurse at [employer]  Lives at home    Review of Systems - 10 point review of system negative except for what is mentioned in the HPI.    Meds and Allergies:  See EHR for the updated medication list and Allergies. These were reviewed.     Family History   Problem Relation Age of Onset     Cancer Mother      Hyperlipidemia Mother      Hypertension Mother      Diabetes Father      Heart disease Father      Cancer Brother      Depression Brother      Cancer Maternal Grandmother      Cancer Maternal Grandfather      Heart disease Paternal Grandmother      Hypertension Paternal  Grandmother      Heart attack Paternal Grandmother      Diabetes Paternal Grandfather      Heart attack Paternal Grandfather        Exam/Data:     N/a    DATA      PFT DATA 3./2020:  FEV1 97% predicted, FVC 90% predicted, ratio 85.  No reversibility.  TLC 90% predicted, RV 93% predicted, DLCO 145%.  Normal flow volume loop    CT chest dated 10/11/2019:  No pulmonary embolus  Possible new 3 mm pulmonary nodule in the lingula in the region of adjacent subsegmental atelectasis.  Continued attention is recommended on follow-up in this patient with history of malignancy  Mild mosaic attenuation throughout the lung suggestive of small vessel or small airway disease.    CT chest abdomen pelvis on February 6, 2020:  No evidence of recurrent, residual, or metastatic disease in the chest, abdomen, or pelvis.  Stable prominent but not enlarged pelvic lymph nodes.  Stable biliary dilatation  Resolution of previously demonstrated distal right ureteral stone.  Diverticulosis without diverticulitis.    Assessment/Plan:   Tisha Arias is a 59 y.o. female, lifelong non-smoker, with history of diffuse large B-cell lymphoma status post R-CHOP, history of breast cancer status post mastectomy and tamoxifen therapy, chronic pain who was referred here for evaluation of abnormal CT scan of the chest which was done back on 10/20/2019.  Her CTA at the time showed no evidence of PE but however she had a 3 mm small nodule in the lingula.  There was also mild mosaic attenuation.  She did show me CT report that was done this year which showed no abnormalities.  Patient is a lifelong non-smoker.      With worsening shortness of breath, I would like to get another CT chest. Her PFT's as noted above were normal.    Recommendations:  - CT chest October 2020  - has mosaic changes suggestive of air trapping noted on CT on 10/2019 for which pt is on albuterol as needed.  - Would recommend cardiac evaluation for chest tightness  - asked clinic  staff to have images pushed to our system    FOLLOW UP: 6 months    Pauline Smith MD  Pulmonary and Critical Care Medicine  7/9/2020        Allergies   Allergen Reactions     Buprenorphine      Chest pain, GI, cramping     Cymbalta [Duloxetine] Anaphylaxis     Nuts - Unspecified Anaphylaxis     Ragweed Pollen Anaphylaxis     Topiramate Anaphylaxis     ataxia     Amitriptyline      seratonin syndrome      Baclofen Swelling     Codeine Nausea And Vomiting     Gabapentin Swelling     Hydroxyzine Headache     Cheat pressure, tremors, back pain     Lamotrigine Nausea And Vomiting and Headache     Fever, swollen glands, muscle pain, chest pain, bruising, neck stiffness, confusion     Lyrica [Pregabalin]      Metaxalone Nausea And Vomiting     Nortriptyline      Other Food Allergy      Melons, bananas, cucumber, zucchini, potatoes, carrots, cherries, celery, apples, pears, plums, peaches, parsnips, kiwi, hazelnuts, apricots     Tree And Shrub Pollen      Birch tree     Buprenorphine-Naloxone Itching, Swelling and Anxiety     chest pain, fatigue     Clonidine Anxiety     tremors       Medications:     Current Outpatient Medications   Medication Sig Dispense Refill     albuterol (PROAIR HFA;PROVENTIL HFA;VENTOLIN HFA) 90 mcg/actuation inhaler Inhale 2 puffs every 6 (six) hours as needed for wheezing.       calcium citrate-vitamin D3 (CITRACAL + D) 315 mg- 250 unit per tablet Take 2 tablets by mouth 3 (three) times a day with meals.       cetirizine (ZYRTEC) 10 MG tablet Take 10 mg by mouth daily as needed.       [START ON 7/10/2020] cyclobenzaprine (FLEXERIL) 10 MG tablet Take 1 tablet (10 mg total) by mouth 2 (two) times a day as needed for muscle spasms. 90 tablet 0     diazePAM (VALIUM) 5 MG tablet Take 1 tablet by mouth as needed.  0     diclofenac sodium (VOLTAREN) 1 % Gel Apply topically 4 (four) times a day.       fluticasone propionate (FLONASE) 50 mcg/actuation nasal spray Apply 1 spray into each nostril daily.        furosemide (LASIX) 20 MG tablet Take 20 mg by mouth 2 (two) times a day.       [START ON 7/12/2020] HYDROmorphone (DILAUDID) 4 MG tablet One to two tabs every three hours as needed 160 tablet 0     ketamine HCl (KETAMINE, BULK, MISC) Use As Directed.       ketamine HCl (KETAMINE, BULK,) 100 % Powd 60 mg lozenge one-half lozenge six times a day as needed 90 Bottle 2     levothyroxine (SYNTHROID, LEVOTHROID) 112 MCG tablet Take 112 mcg by mouth 2 (two) times a day.       magnesium oxide (MAG-OX) 400 mg (241.3 mg magnesium) tablet Take 1 tablet (400 mg total) by mouth 3 (three) times a day. 90 tablet 0     montelukast (SINGULAIR) 10 mg tablet Take 20 mg by mouth 2 (two) times a day.       naloxone (NARCAN) 4 mg/actuation nasal spray 1 spray (4 mg dose) into one nostril for opioid reversal. Call 911. May repeat if no response in 3 minutes. 1 Box 0     oxyCODONE (OXYCONTIN) 20 mg 12 hr tablet Take 1 tablet by mouth every 4 (four) hours as needed.       potassium chloride (K-DUR,KLOR-CON) 20 MEQ tablet Take 20 mEq by mouth 2 (two) times a day.       SUMAtriptan (IMITREX) 50 MG tablet Take 50 mg by mouth every 2 (two) hours as needed.        VITAMIN D3 2,000 unit capsule Take 5,000 Units by mouth daily.   1     No current facility-administered medications for this visit.        Much or all of the text in this note was generated through the use of the Dragon Dictate voice-to-text software. Errors in spelling or words which seem out of context are unintentional. Sound alike errors, in particular, may have escaped editing.

## 2021-06-09 NOTE — PROGRESS NOTES
"Tisha Arias is a 59 y.o. female who is being evaluated via a billable video visit.      The patient has been notified of following:     \"This video visit will be conducted via a call between you and your physician/provider. We have found that certain health care needs can be provided without the need for an in-person physical exam.  This service lets us provide the care you need with a video conversation.  If a prescription is necessary we can send it directly to your pharmacy.  If lab work is needed we can place an order for that and you can then stop by our lab to have the test done at a later time.    Video visits are billed at different rates depending on your insurance coverage. Please reach out to your insurance provider with any questions.    If during the course of the call the physician/provider feels a video visit is not appropriate, you will not be charged for this service.\"    Patient has given verbal consent to a Video visit? Yes  How would you like to obtain your AVS? AVS Preference: MyChart.  If dropped by the video visit, the video invitation should be sent to: Send to e-mail at: [email or web address]  Will anyone else be joining your video visit? No      Patient is here for a follow up appointment with Dr. Berry. Patient has upper back, bilateral hips and shoulders and sternum pain, describes the pain as constant deep bone pain,rates the pain as 9/10. Patient needs refills for Flexeril, Ketamine and hydromorphone. CSA, BOB, NDI and UDT are deferred due to COVID 19. Functionality is 8. Patient states their pain interferes with sleep, walking,work, ADL's and relationships.    Maine Ryan LPN  "

## 2021-06-10 NOTE — TELEPHONE ENCOUNTER
Medication being requested: Hydromorphone.  Last visit date: 7/27.  Provider: keyla  Next visit date: 9/8.  Provider: keyla  Expected follow up: 8 weeks  MTM visit ([facility] Center) date: no  UDT date: 10/2109  Agreement date: 10/2019   (Last fill date; name; strength; provider; MME; quantity):  Last filled Hydromorphone 8 mg. #112 tabs.   Pertinent between visit information about requested medication (telephone, mychart, prior authorization, concerns, comments): patient requesting 4 mg tabs not 8 mg. States that is is difficult to split the 8 mg, and if trying to reduce can do better with 4 mg tabs.   Will review with  Not sure if is to taper.  Last OV med was changed to 8 mg.

## 2021-06-10 NOTE — PROGRESS NOTES
"Assessment/Plan:     Problem List Items Addressed This Visit     Other chronic pain    Relevant Medications    ketamine HCl (KETAMINE, BULK,) 100 % Powd (Start on 8/1/2020)    HYDROmorphone (DILAUDID) 4 MG tablet (Start on 7/31/2020)    cyclobenzaprine (FLEXERIL) 10 MG tablet (Start on 8/24/2020)    Chronic pain disorder - Primary    Relevant Medications    HYDROmorphone (DILAUDID) 8 MG tablet (Start on 7/30/2020)              No follow-ups on file.    Patient Instructions   PLAN:    Change Hydromorphone to 8 mg tabs every three hours as needed    Discussed other form of Magnesium that may be better absorbed, Mg Glycinate, Mg Orotate, chelated Magnesium, Magnesium Threonate, may get at  [facility] or [facility]    May contact Leilani Woodruff for another opinion on Frequency Specific Microcurrent, may help obtain machine, email [email or web address]    May schedule with [facility] for Frequency Specific Microcurrent    May look on youtube for FSM, by Katelin Mathew    Return in 8 weks            Subjective:       59 y.o. female Tisha Arias is a 59 y.o. female who is being evaluated via a billable video visit.      The patient has been notified of following:     \"This video visit will be conducted via a call between you and your physician/provider. We have found that certain health care needs can be provided without the need for an in-person physical exam.  This service lets us provide the care you need with a video conversation.  If a prescription is necessary we can send it directly to your pharmacy.  If lab work is needed we can place an order for that and you can then stop by our lab to have the test done at a later time.    Video visits are billed at different rates depending on your insurance coverage. Please reach out to your insurance provider with any questions.    If during the course of the call the physician/provider feels a video visit is not appropriate, you will not be charged for this " "service.\"    Patient has given verbal consent to a Video visit? yes  How would you like to obtain your AVS? My chart  If dropped by the video visit, the video invitation should be sent to: my chart  Will anyone else be joining your video visit? no        Video Start Time: 8:18    Additional provider notes:       Video-Visit Details    Type of service:  Video Visit *;42    Video End Time (time video stopped):   Originating Location (pt. Location): home    Distant Location (provider location):  [facility]     Platform used for Video Visit: Priti        Continue reviews having a bad 2 weeks, significant pain in the muscles of her legs calves.  She can have upper abdominal cramps feeling \"raw\".  She notes magnesium supplementation usually helps though did not find of this case.  She continues to feel a sense of bruising in her calves and arms.  The last couple days do not bit better.  Graph during this time also felt more of a sense of fluid in her chest under her sternum.  No clear precipitants.    She reviews with the hydromorphone generally has done okay able to decrease her pain from a 9 to a 4, usually needs to take 2 of the 4 mg tablets at a time, up to 16 in a day.    Continues using the ketamine 60 mg lozenge 4 times a day does seem to help sleep.    Reviews trying medical cannabis with limited benefit.    She had been doing frequency specific microcurrent, this was stopped due to the coronavirus.     reviewed, as expected.      Current Outpatient Medications:      albuterol (PROAIR HFA;PROVENTIL HFA;VENTOLIN HFA) 90 mcg/actuation inhaler, Inhale 2 puffs every 6 (six) hours as needed for wheezing., Disp: 1 Inhaler, Rfl: 4     calcium citrate-vitamin D3 (CITRACAL + D) 315 mg- 250 unit per tablet, Take 2 tablets by mouth 3 (three) times a day with meals., Disp: , Rfl:      cetirizine (ZYRTEC) 10 MG tablet, Take 10 mg by mouth daily as needed., Disp: , Rfl:      [START ON 8/24/2020] " cyclobenzaprine (FLEXERIL) 10 MG tablet, Take 1 tablet (10 mg total) by mouth 2 (two) times a day as needed for muscle spasms., Disp: 90 tablet, Rfl: 0     diazePAM (VALIUM) 5 MG tablet, Take 1 tablet by mouth as needed., Disp: , Rfl: 0     diclofenac sodium (VOLTAREN) 1 % Gel, Apply topically 4 (four) times a day., Disp: , Rfl:      fluticasone propionate (FLONASE) 50 mcg/actuation nasal spray, Apply 1 spray into each nostril daily., Disp: , Rfl:      furosemide (LASIX) 20 MG tablet, Take 20 mg by mouth 2 (two) times a day., Disp: , Rfl:      [START ON 7/31/2020] HYDROmorphone (DILAUDID) 4 MG tablet, One to two tabs every three hours as needed, Disp: 160 tablet, Rfl: 0     [START ON 8/1/2020] ketamine HCl (KETAMINE, BULK,) 100 % Powd, 60 mg lozenge one-half lozenge six times a day as needed, Disp: 90 Bottle, Rfl: 2     levothyroxine (SYNTHROID, LEVOTHROID) 112 MCG tablet, Take 112 mcg by mouth 2 (two) times a day., Disp: , Rfl:      magnesium oxide (MAG-OX) 400 mg (241.3 mg magnesium) tablet, Take 1 tablet (400 mg total) by mouth 3 (three) times a day., Disp: 90 tablet, Rfl: 0     montelukast (SINGULAIR) 10 mg tablet, Take 20 mg by mouth 2 (two) times a day., Disp: , Rfl:      naloxone (NARCAN) 4 mg/actuation nasal spray, 1 spray (4 mg dose) into one nostril for opioid reversal. Call 911. May repeat if no response in 3 minutes., Disp: 1 Box, Rfl: 0     potassium chloride (K-DUR,KLOR-CON) 20 MEQ tablet, Take 20 mEq by mouth 2 (two) times a day., Disp: , Rfl:      SUMAtriptan (IMITREX) 50 MG tablet, Take 50 mg by mouth every 2 (two) hours as needed. , Disp: , Rfl:      VITAMIN D3 2,000 unit capsule, Take 5,000 Units by mouth daily. , Disp: , Rfl: 1     [START ON 7/30/2020] HYDROmorphone (DILAUDID) 8 MG tablet, Take 1 tablet (8 mg total) by mouth every 3 (three) hours as needed for pain., Disp: 112 tablet, Rfl: 0     ketamine HCl (KETAMINE, BULK, MISC), into each nostril daily as needed. , Disp: , Rfl:      oxyCODONE  "(OXYCONTIN) 20 mg 12 hr tablet, Take 1 tablet by mouth every 4 (four) hours as needed., Disp: , Rfl:     By video alert, clear sensorium.  Thought process tight logical.  Affect is constricted in range.  No significant pain behavior.  No respiratory distress.    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  Has been on a variety of courses of opioids, with previous concern for opioid analgesia not found with tapering off opioids, as following the hydromorphone no need to take frequently is provided better relief.  However recent flareup, etiology unclear.  Usually responds to higher doses of sneezing supplementation.       Objective:     Vitals:    07/27/20 0821   Height: 5' 5\" (1.651 m)   PainSc:   9   PainLoc: Back       Steve: Discussed other forms of magnesium that may be better absorbed.    Will change the hydromorphone to 8 mg tablets 1/2-1 up to 16 in a day.    Discussed resources for frequency specific microcurrent.    We will follow-up in several weeks.     reviewed, as expected.  Last urine drug test in October      Telephone time more than 25 minutes.  This note has been dictated using voice recognition software. Any grammatical or context distortions are unintentional and inherent to the software  "

## 2021-06-10 NOTE — TELEPHONE ENCOUNTER
"Patient calls, message is pressured and rude sounding.  \"I need someone to call me and not those people who do the refills\"  This is a message that we receive from this patient on a regular basis. She is quite rude and condescending to the nursing staff.  Sending to the provider as this is not appropriate and ongoing.  "

## 2021-06-10 NOTE — TELEPHONE ENCOUNTER
Refill request: magnesium  400 mg  Requested Prescriptions     Pending Prescriptions Disp Refills     magnesium oxide (MAG-OX) 400 mg (241.3 mg magnesium) tablet [Pharmacy Med Name: Magnesium Oxide Oral Tablet 400 MG] 90 tablet 0     Sig: Take 1 tablet (400 mg total) by mouth 3 (three) times a day.     [facility]

## 2021-06-10 NOTE — TELEPHONE ENCOUNTER
Patient left a condescending message for the nursing staff. Upset about the date on refill. Wants to  med today. Claims that the nursing staff always messing refill up, and has talked to Dr Berry multiple times about refilling day early.     Just to clarify this. The nursing staff did not cue up the refill. The message was passed on to the Dr, that patient wanted to change her Dilaudid dose. It was up to the Dr to do the order. He ordered the refill and dated it.

## 2021-06-10 NOTE — PATIENT INSTRUCTIONS - HE
PLAN:    Change Hydromorphone to 8 mg tabs every three hours as needed    Discussed other form of Magnesium that may be better absorbed, Mg Glycinate, Mg Orotate, chelated Magnesium, Magnesium Threonate, may get at  [facility] or [place] pharmacy    May contact Leilani Woodruff for another opinion on Frequency Specific Microcurrent, may help obtain machine, email [email or web address]    May schedule with [facility] for Frequency Specific Microcurrent    May look on youtube for FSM, by Katelin Mathew    Return in 8 weks

## 2021-06-10 NOTE — TELEPHONE ENCOUNTER
Telephone call with patient, call for refill of the hydromorphone.  She reports she picked up the medication last on 812, it lasted 13-1/2 days and ran out yesterday.    Reviewing the record she was given a prescription on 7/30, 8 mg tablets to last 14 days.  If she picked it up on the 12th it was due to start the next day.  I do see the last prescription was written for the 4 mg tablets #220 rather than 224 Silvadene could have run out part of the day early.    I did discuss with patient the perception that our staff find her phone calls rude and condescending.  But this cannot be tolerated.    I did direct the refill for #224 and that it can be filled this evening and this will be discussed at future appointments

## 2021-06-10 NOTE — TELEPHONE ENCOUNTER
See note that patient has been leaving condescending messages to the nursing staff.    Her last hydromorphone was filled on 7/30, which was a Thursday.  Thus the next due date would be 8/13 at Thursday.  I attempted to call patient back today, her voicemail was full

## 2021-06-10 NOTE — TELEPHONE ENCOUNTER
Medication being requested: hydromorphone 4 mg  Last visit date: 7/27  Provider: CASI  Next visit date: 09/08  Provider: CASI  Expected follow up: 8 weeks  MTM visit ([facility]) date: no  UDT/CSA 10/2019   (Last fill date; name; strength; provider; MME; quantity):  Last filled on 8/12, start date of 8/13-14 days  Pertinent between visit information about requested medication (telephone, mychart, prior authorization, concerns, comments): NA  Script being sent to provider by nurse- dates and quantity:   Requested Prescriptions     Pending Prescriptions Disp Refills     HYDROmorphone (DILAUDID) 4 MG tablet 220 tablet 0     Sig: One to two tabs every three hours as needed     Pharmacy cued: [facility]  Standing orders for withdrawal protocol implemented: NA

## 2021-06-11 NOTE — PROGRESS NOTES
Assessment/Plan:     Problem List Items Addressed This Visit     None            No follow-ups on file.    There are no Patient Instructions on file for this visit.      Subjective:       59 y.o. female was scheduled for virtual visit today.  Staff contacted twice did not reach her.    Reviewing  on 8/26 did refill the hydromorphone 4 mg #224 and ketamine.             Objective:   There were no vitals filed for this visit.                This note has been dictated using voice recognition software. Any grammatical or context distortions are unintentional and inherent to the software

## 2021-06-11 NOTE — TELEPHONE ENCOUNTER
Medication being requested: Hydromorphone  Last visit date: 7/27    Provider: CASI  Next visit date: needs scheduling   Provider: CASI  Expected follow up: 8 weeks  [facility]M visit ([facility]) date: fernando  UDT date: 10/2019  Agreement date: 10/2019   (Last fill date; name; strength; provider; MME; quantity):  reviewed last filled 8/26, # 224, 14 day supply due 9/8.    Pertinent between visit information about requested medication (telephone, mychart, prior authorization, concerns, comments): No show for appointment 9/8-left message for patient to reschedule  Script being sent to provider by nurse- dates and quantity:   Requested Prescriptions     Pending Prescriptions Disp Refills     HYDROmorphone (DILAUDID) 4 MG tablet 224 tablet 0     Sig: One to two tabs every three hours as needed     Pharmacy cued: [facility]  Standing orders for withdrawal protocol implemented: fernando

## 2021-06-11 NOTE — TELEPHONE ENCOUNTER
Refill request: dilaudid 4 mg  Last ov with BE: 7/27  Patient missed visit scheduled 9/8  Reschedule for 11/03 with BE  Please see notes attached regarding plan of care and tapering patient.  Will cue next 7 day supply  Please address and authorize for provider out of the office  Last fill on 9/22-7 days  Requested Prescriptions     Pending Prescriptions Disp Refills     HYDROmorphone (DILAUDID) 4 MG tablet 56 tablet 0     Sig: TAKE ONE TABLET BY MOUTH EVERY THREE HOURS AS NEEDED     Signed Prescriptions Disp Refills     HYDROmorphone (DILAUDID) 4 MG tablet 56 tablet 0     Sig: TAKE ONE TABLET BY MOUTH EVERY THREE HOURS AS NEEDED     Authorizing Provider: ADRI DIXON

## 2021-06-11 NOTE — TELEPHONE ENCOUNTER
After returned message from provider, patient missed her appointment on 9/8/20 therefore a taper has began. RN tried calling patient but had to leave a message for a returned call.

## 2021-06-11 NOTE — TELEPHONE ENCOUNTER
Reviewed providers last note and patients concerns. Behaviors displayed and documented, noted. Continue with reduced dose as directed by her providers plan of care. Patient may have some withdrawal symptoms- she is currently on flexeril and continued use of ketamine. If she feels that she needs vistaril ok to send in if no allergies. Patient may benefit from mindfulness techniques for her acute breakthrough pain which is known to be increased with emotional stress is increased.     Verenice Arthur, CNP

## 2021-06-11 NOTE — TELEPHONE ENCOUNTER
Patient left a message stating that she picked up her Rx for Dilaudid today and only received 8/day instead of the usual 16/day.  Patient states that she usually takes 8mg every 3 hours which helps, only taking 1 tablet every 3 hours does not.  Dr. Berry, please advise why the change.

## 2021-06-11 NOTE — TELEPHONE ENCOUNTER
"Pt called to report no one called her today for her appointment, which she states was at 0830 or 11 am. States \"I missed my appointment but need my Dilaudid.\" No appointment scheduled.     Medication being requested: Dilaudid 8 mg  Last visit date: 07/27.  Provider: BE  Next visit date: Not scheduled.  Provider: BE  Expected follow up: 8 wks  MTM visit ([facility]) date: N/A  UDT date: 10/2019  Agreement date: 10/2019   (Last fill date; name; strength; provider; MME; quantity):  Last filled for 7 days 09/08-09/15 -Per . This is appropriate  Pertinent between visit information about requested medication (telephone, mychart, prior authorization, concerns, comments): Pt missed appt 09/08 so taper was ordered. Needs an appointment  Script being sent to provider by nurse- dates and quantity:   Ph[facility]acy cued:[facility]   Standing orders for withdrawal protocol implemented: N/A    "

## 2021-06-11 NOTE — TELEPHONE ENCOUNTER
"Patient left message this afternoon, wondering why she is getting less Dilaudid. Has not been discussed and not happy, because, \"my pain is off the charts\".    Patient had last OV, on 7/27.   Had a NoShow on 9/8. Now not rescheduled until 11/3. Dr Berry started taper, per Dr Berry note  9/11/20 3:58 PM   Pt did not show for her appt, pts may be placed on tapers and shorter scripts when don't keep appts   [facility]    I spoke with patient just now. I explained the above in detail.  Patient was unhappy that she is being reduced \"drastically. She said that 8 per day is not working. She also said, \"what am I supposed to do, just put up with it\".     Patient is on a waiting list for appt sooner than 11/3. She was not happy with my message, and wanted me to document, \"I am not arguing with you, so don't write that down\".        "

## 2021-06-12 NOTE — PATIENT INSTRUCTIONS - HE
PLAN:   Hydromore phone 8 mg tablets 1 every 3 hours.    Ketamine 60 mg lozenges 1/2 lozenge every 3 hours.    Magnesium topical cream for your abdominal spasms apply the abdomen 3 times a day from [facility].    Flexeril 10 mg 3 times a day.    When you see your oncologist, inquire whether your abdominal spasms is related to your lower extremity edema.    You may come in on 11/11, 9 PM for frequency specific microcurrent with Dr. Berry.    Appointment to the office with Dr. Berry to 8 weeks.

## 2021-06-12 NOTE — TELEPHONE ENCOUNTER
Patient called and stated that her insurance will cover 2 4mg tablets every 3 hours, please advise. Patient is asking for a call back ASAP

## 2021-06-12 NOTE — TELEPHONE ENCOUNTER
Medication being requested: dilaudid 4mg  Weekly RX  Last visit date: 7/27.  Provider: CASI  Next visit date: 11/3.  Provider: CASI missed appt 9/8  Expected follow up: 8 wk  UDT date: 10/19  Agreement date: 10/19   (Last fill date; name; strength; provider; MME; quantity):   reviewed: hydrocodone last filled 10/13 qu:56 supply 7day  Pertinent between visit information about requested medication (telephone, mychart, prior authorization, concerns, comments): prescription amts have been reduced  Script being sent to provider by nurse- dates and quantity: she is due Tuesday   Requested Prescriptions     Pending Prescriptions Disp Refills     HYDROmorphone (DILAUDID) 4 MG tablet 56 tablet 0     Sig: TAKE ONE TABLET BY MOUTH EVERY THREE HOURS AS NEEDED       Pharmacy cued: [facility]

## 2021-06-12 NOTE — PROGRESS NOTES
"Tisha Arias is a 59 y.o. female who is being evaluated via a billable video visit. Patient has upper back, bilateral hips and shoulders and sternum pain, describes the pain as constant deep bone pain,rates the pain as 9/10. Functionality is 8. Patient states their pain interferes with sleep, walking,work, ADL's and relationships.     The patient has been notified of following:      \"This video visit will be conducted via a call between you and your physician/provider. We have found that certain health care needs can be provided without the need for an in-person physical exam.  This service lets us provide the care you need with a video conversation.  If a prescription is necessary we can send it directly to your pharmacy.  If lab work is needed we can place an order for that and you can then stop by our lab to have the test done at a later time.     Video visits are billed at different rates depending on your insurance coverage. Please reach out to your insurance provider with any questions.     If during the course of the call the physician/provider feels a video visit is not appropriate, you will not be charged for this service.\"     Patient has given verbal consent to a Video visit? Yes  How would you like to obtain your AVS? AVS Preference: mail.  If dropped by the video visit, the video invitation should be sent to: [phone] DOX[facility]  Will anyone else be joining your video visit? No        "

## 2021-06-12 NOTE — TELEPHONE ENCOUNTER
Call to [facility] pharmacist at pt's request regarding her Dilaudid script. Pharmacist would like to dispense quantity for 3 days instead of 7 days as the script is due to fill Saturday. This would allow quantity to pt's   follow-up Tuesday, 11/3, and get her back on Tuesday refills. Pharmacist states he won't need to have a new script sent. Called pt to discuss and she agrees with the plan.

## 2021-06-12 NOTE — PROGRESS NOTES
"Assessment/Plan:     Problem List Items Addressed This Visit     Other chronic pain    Relevant Medications    cyclobenzaprine (FLEXERIL) 10 MG tablet    ketamine HCl (KETAMINE, BULK,) 100 % Powd    Neuropathic pain - Primary    Relevant Medications    HYDROmorphone (DILAUDID) 8 MG tablet              No follow-ups on file.    Patient Instructions   PLAN:   Hydromore phone 8 mg tablets 1 every 3 hours.    Ketamine 60 mg lozenges 1/2 lozenge every 3 hours.    Magnesium topical cream for your abdominal spasms apply the abdomen 3 times a day from [facility] pharmacy.    Flexeril 10 mg 3 times a day.    When you see your oncologist, inquire whether your abdominal spasms is related to your lower extremity edema.    You may come in on 11/11, 9 PM for frequency specific microcurrent with Dr. Berry.    Appointment to the office with Dr. Berry to 8 weeks.        Subjective:       60 y.o. female Tisha Arias is a 60 y.o. female who is being evaluated via a billable video visit.      The patient has been notified of following:     \"This video visit will be conducted via a call between you and your physician/provider. We have found that certain health care needs can be provided without the need for an in-person physical exam.  This service lets us provide the care you need with a video conversation.  If a prescription is necessary we can send it directly to your pharmacy.  If lab work is needed we can place an order for that and you can then stop by our lab to have the test done at a later time.    Video visits are billed at different rates depending on your insurance coverage. Please reach out to your insurance provider with any questions.    If during the course of the call the physician/provider feels a video visit is not appropriate, you will not be charged for this service.\"    Patient has given verbal consent to a Video visit? yes  How would you like to obtain your AVS?   If dropped by the video visit, the video " "invitation should be sent to:   Will anyone else be joining your video visit?         Video Start Time:     Additional provider notes:       Video-Visit Details    Type of service:  Video Visit    Video End Time (time video stopped):   Originating Location (pt. Location):     Distant Location (provider location):  [facility]     Platform used for Video Visit: doximety      Patient did not show for 9/8 appointment.  Patient later called a week indicating mixing up with a different doctor's appointment.  As such was placed on lower dose with more frequent refills until can be seen.    She reports that she has been significantly struggling with the pain with the decreased dose of medications\" unbearable\"    When she was taking the hydromorphone 8 mg every 3 hours she had much better control of the pain.  Tended to take 8 tablets in a day.    She has been trying different forms of magnesium and with magnesium citrate it seems to help with spasms in her arms and legs.    Continues to have significant abdominal spasms in her left abdomen to the point it could turn black and blue.  She sees her oncologist and will review this.    Flexeril has been decreased inadvertently to 10 mg twice a day, did better 3 times a day.  Had been on baclofen at this point with swelling of the side of her face tightness in her chest, similarly to Robaxin.  She has tried some of the ketamine gel, spasms which takes it about 2 hours to work.    Has otherwise been using ketamine lozenges 60 mg lozenge 1/2 lozenge every 3 hours the takes the head off.    She had been doing frequency specific microcurrent, stop with holding, return to try a different approach to help with the lower extremity edema seem to have even more edema troubles with breathing it stopped.  It seems to have resolved somewhat but is still has the edema, lower extremity can make the pants tight.  Uses compression stockings.  She will discuss this with the " oncologist.      Current Outpatient Medications:      albuterol (PROAIR HFA;PROVENTIL HFA;VENTOLIN HFA) 90 mcg/actuation inhaler, Inhale 2 puffs every 6 (six) hours as needed for wheezing., Disp: 1 Inhaler, Rfl: 4     calcium citrate-vitamin D3 (CITRACAL + D) 315 mg- 250 unit per tablet, Take 2 tablets by mouth 3 (three) times a day with meals., Disp: , Rfl:      cetirizine (ZYRTEC) 10 MG tablet, Take 10 mg by mouth daily as needed., Disp: , Rfl:      cyclobenzaprine (FLEXERIL) 10 MG tablet, Take 1 tablet (10 mg total) by mouth 3 (three) times a day as needed for muscle spasms., Disp: 90 tablet, Rfl: 2     diazePAM (VALIUM) 5 MG tablet, Take 1 tablet by mouth as needed., Disp: , Rfl: 0     diclofenac sodium (VOLTAREN) 1 % Gel, Apply topically 4 (four) times a day., Disp: , Rfl:      fluticasone propionate (FLONASE) 50 mcg/actuation nasal spray, Apply 1 spray into each nostril daily., Disp: , Rfl:      furosemide (LASIX) 20 MG tablet, Take 20 mg by mouth 2 (two) times a day., Disp: , Rfl:      HYDROmorphone (DILAUDID) 8 MG tablet, Take 1 tablet (8 mg total) by mouth every 3 (three) hours as needed for pain., Disp: 112 tablet, Rfl: 0     ketamine HCl (KETAMINE, BULK, MISC), into each nostril daily as needed. , Disp: , Rfl:      ketamine HCl (KETAMINE, BULK,) 100 % Powd, 60 mg lozenge one-half lozenge six times a day as needed, Disp: 90 Bottle, Rfl: 2     levothyroxine (SYNTHROID, LEVOTHROID) 112 MCG tablet, Take 112 mcg by mouth 2 (two) times a day., Disp: , Rfl:      mometasone (ASMANEX HFA) 200 mcg/actuation HFAA, Inhale 400 mcg 2 (two) times a day., Disp: 1 Inhaler, Rfl: 11     montelukast (SINGULAIR) 10 mg tablet, Take 20 mg by mouth 2 (two) times a day., Disp: , Rfl:      naloxone (NARCAN) 4 mg/actuation nasal spray, 1 spray (4 mg dose) into one nostril for opioid reversal. Call 911. May repeat if no response in 3 minutes., Disp: 1 Box, Rfl: 0     potassium chloride (K-DUR,KLOR-CON) 20 MEQ tablet, Take 20 mEq by  mouth 2 (two) times a day., Disp: , Rfl:      SUMAtriptan (IMITREX) 50 MG tablet, Take 50 mg by mouth every 2 (two) hours as needed. , Disp: , Rfl:      VITAMIN D3 2,000 unit capsule, Take 5,000 Units by mouth daily. , Disp: , Rfl: 1    Video alert clear sensorium.  Appropriately groomed.  No respiratory distress.  Affect is constricted near tears much of the interview.                   Objective:     Vitals:         Assessment: Significant neuropathy since use of tamoxifen for breast cancer.  Ihasused a variety of opioids and neuropathic agents.  Was doing relatively better with a higher dose of hydromorphone.    Plan: We will resume the hydromorphone 8 mg every 3 hours.    We will need to obtain urine drug test substance agreement.    We reviewed what was expected.    Use magnesium and a topical cream 20% to see if helps with abdominal spasms.    We will schedule for frequency specific microcurrent this office to see if some different settings will help with the neuropathy related to chemotherapy.    Total time more than 25 minutes.        This note has been dictated using voice recognition software. Any grammatical or context distortions are unintentional and inherent to the software

## 2021-06-12 NOTE — TELEPHONE ENCOUNTER
Pharmacy calls, patient picked up covering providers RX yesterday, 1027, 7 day supply.  New RX was sent for fill today.  Also cued for another weeks supply  Pharmacy requesting clarification.    Will cue a new RX with more specific directions.    Is this RX meant to be taken around the clock, currently patient is taking this every 3 hours 24/7? Please add parameters if meant to be a 14 day supply    Requested Prescriptions     Pending Prescriptions Disp Refills     HYDROmorphone (DILAUDID) 4 MG tablet 56 tablet 0     Sig: TAKE ONE TABLET BY MOUTH EVERY THREE HOURS AS NEEDED     Signed Prescriptions Disp Refills     HYDROmorphone (DILAUDID) 4 MG tablet 56 tablet 0     Sig: TAKE ONE TABLET BY MOUTH EVERY THREE HOURS AS NEEDED     Authorizing Provider: ADRI DIXON

## 2021-06-12 NOTE — TELEPHONE ENCOUNTER
2020 Prior Authorization Request  Who's requesting PA: Pharmacy  Provider: Jamal  Pharmacy Name, Location & Phone # : [facility]/[phone]  Medication Name: hydromorphone 8mg tabs  Quantity: 112/ 28 days  Medication Instructions: Take one tablet by mouth every 8 hours as needed for pain.   Insurance Plan: [employer]  Insurance Member ID & GRP # : [id number]  Informed patient that prior authorizations can take up to 10 business days for response: YES  Okay to leave a detailed message: NO

## 2021-06-12 NOTE — TELEPHONE ENCOUNTER
See notes from the pharmacist.  The prescription given today for 8 mg every 3 hours will require prior authorization.    Patient calls to state that previously she used   4 mg tabs, 2 tablets every 3 hours, and the insurance would cover them all for 14 days of 224.  Reports that was done for several months this summer.  She notes the new pharmacist did not look in the event.    That is sent in for tonight, she notes she is out today, and there will be no other refills tonight if this is not covered.

## 2021-06-12 NOTE — TELEPHONE ENCOUNTER
Pharmacy calls, new orders for hydromorphone 8 mg, 8 tabs daily will not go through insurance, insurance will allow up to 6 tabs daily, this will need a PA.

## 2021-06-12 NOTE — TELEPHONE ENCOUNTER
"Received call from [facility] pharmacist Rob stating that Tisha's new prescription dose for Hydromorphone 8 mg tabs 8 tablets/day would not be approved by her insurance and would need a prior authorization. He stated that it would pay for 8 mg tablets 6 tablets/ day and that this dose was still an increase for her to use until the prior authorization could be obtained. Discussed situation with Dr Berry and dose changed as discussed. Returned call to pharmacist and he offer to call patient with update. Pharmacist called back to state that she \" was not happy and wanted the order changed to 2, 4 mg tablets every three hours\". Informed that Dr Berry stated that she was already getting and increase until she could get prior authorization.  Patient then called clinic and refused to leave a message on the triage line.  "

## 2021-06-12 NOTE — TELEPHONE ENCOUNTER
Refill request: dilaudid  Last ov with BE: 7/27/20  Patient did not show for apt on 9/8/20 with BE    UDT and CSA: 10/23/2019-OVERDUE  UDT from 10/23/2019, nordiazepam, oxazepam, tomazepam, tramadol-all present.    Unable to see back further than 10/2019 in   Unsure what had been prescribed prior to that timeframe.    Will cue smaller quantity for covering provider  Regular provider to address next refill once back in the clinic  This RX also needs parameters as currently it is being listed as a 7 day RX which means that patient is taking this around the clock which is not the typical prescribing method as the patient would not be getting up every three hours all night long to take the medication.    Last filled on 10/20-listed as a 7 day RX.    Requested Prescriptions     Pending Prescriptions Disp Refills     HYDROmorphone (DILAUDID) 4 MG tablet 24 tablet 0     Sig: TAKE ONE TABLET BY MOUTH EVERY THREE HOURS AS NEEDED     [facility]

## 2021-06-12 NOTE — TELEPHONE ENCOUNTER
Refill request: dilaudid 4 mg  Last ov with BE: 7/27  Missed apt on 9/8  Prescription amounts have been reduced.  Next ov with BE: 11/03  UDT/CSA 10/2019    Last filled on 10/6-7 days    Requested Prescriptions     Pending Prescriptions Disp Refills     HYDROmorphone (DILAUDID) 4 MG tablet 56 tablet 0     Sig: TAKE ONE TABLET BY MOUTH EVERY THREE HOURS AS NEEDED     [facility]

## 2021-06-13 NOTE — TELEPHONE ENCOUNTER
Medication being requested: Mag-Ox  Last visit date: 11/3/20 Provider: CASI  Next visit date: 12/23/20 Provider: CASI  Expected follow up: 8 weeks  MTM visit ([facility]) date: No  Pertinent between visit information about requested medication (telephone, mychart, prior authorization, concerns): No  Last date prescribed: 10/1/20  Provider responsible: BE  Spoke with patient: darren  Script being sent to provider - dates and quantity:   11/23/20-12/23/20  Requested Prescriptions     Pending Prescriptions Disp Refills     magnesium oxide (MAG-OX) 400 mg (241.3 mg magnesium) tablet [Pharmacy Med Name: Magnesium Oxide Oral Tablet 400 MG] 90 tablet 0     Sig: Take 1 tablet (400 mg total) by mouth 3 (three) times a day.     Pharmacy cued: [facility]

## 2021-06-13 NOTE — TELEPHONE ENCOUNTER
"Patient calling back at this time, writer returns call to patient.  Reviewed request for her to bring in hydromorphone prior to further refills of oxycodone.  At this time, patient not in agreement with this policy, will only bring medication in if we reimburse her for this.  She reports that she is not taking hydromorphone and has it locked up, reviewed that if she is not taking this medication then us destroying this would not create any difference in the current situation as she is not intending to take this anyway.  Conversation does not seem to be effective, patient would like to speak with the provider.  Again reviewed our policy of safely managing narcotics and in order to do so we cannot send out refills when she already has another similar medication at home.  Reviewed that our intention is to safely manage this type of medication in the best interest and safety of the patient.  At this time, patient is not willing to bring in the hydromorphone.  Conversation continues to return to us \"nurses\" accusing the patient of something and not being respectful towards her.  \"I better not see in my chart that I was in any way argumentative, I have followed everything that has been asked of me\"  Again asked if she would bring in the medication not currently prescribed and patient stated,\"just tell Dr. Berry to call me\"    There have been multiple ineffective conversations with this patient, inability to effectively relay our practices and policies. Ultimately,clinical staff is attempting to manage things safely and are unable to do so at this time.    Verbally relayed this information to the provider, at this time will expect the patient to bring in the hydromorphone prior to any further prescribing.    "

## 2021-06-13 NOTE — TELEPHONE ENCOUNTER
Pt came to clinic for UDT and CSA as requested  Hydromorphone 8 mg queued and fill date left off the script so pharmacy can start PA process. Med is due to fill 11/17  Will call pharmacy and cancel the Dilaudid 4 mg script ordered for 11/17.

## 2021-06-13 NOTE — PROGRESS NOTES
Patient called earlier today reporting and changing the from the hydrocodone to the oxycodone is not having angioedema or the swelling, but has poorly controlled pain, taking oxycodone 10 mg every 4 hours each dose only lasts an hour and a half and getting 4 hours of sleep.  Message returned to patient later in the day to call back next week.

## 2021-06-13 NOTE — TELEPHONE ENCOUNTER
Adding refill request: hydromorphone  Apparently patient is consistently allowed to pick her narcotic refill up 1 day early.    According to refill process for RN's this will be cued appropriately.    Last refilled: 11/17-14 days    Requested Prescriptions     Pending Prescriptions Disp Refills     HYDROmorphone (DILAUDID) 4 MG tablet 224 tablet 0     Sig: Take 1 tablet (4 mg total) by mouth every 3 (three) hours as needed for pain.     Signed Prescriptions Disp Refills     magnesium oxide (MAG-OX) 400 mg (241.3 mg magnesium) tablet 90 tablet 0     Sig: Take 1 tablet (400 mg total) by mouth 3 (three) times a day.     Authorizing Provider: ADRI DIXON     [facility]

## 2021-06-13 NOTE — TELEPHONE ENCOUNTER
Patient has picked up both hydromorphone and oxycodone, unclear if she is supposed to be getting both medications as there is a note indicating changing back to oxycodone.  Please clarify the plan and will need to contact patient to have her bring in hydromorphone if not supposed to have gotten this.

## 2021-06-13 NOTE — PROGRESS NOTES
Urine Drug Test: 11/13/2020     Medication:   Last dose of scheduled / tracked / medical cannabis / CBD: Hydromorphone last taken on 11/13/20 @ 215pm    Witnessed Patch Location: n/a    Medication Current List    Current Outpatient Medications:      albuterol (PROAIR HFA;PROVENTIL HFA;VENTOLIN HFA) 90 mcg/actuation inhaler, Inhale 2 puffs every 6 (six) hours as needed for wheezing., Disp: 1 Inhaler, Rfl: 4     calcium citrate-vitamin D3 (CITRACAL + D) 315 mg- 250 unit per tablet, Take 2 tablets by mouth 3 (three) times a day with meals., Disp: , Rfl:      cetirizine (ZYRTEC) 10 MG tablet, Take 10 mg by mouth daily as needed., Disp: , Rfl:      cyclobenzaprine (FLEXERIL) 10 MG tablet, Take 1 tablet (10 mg total) by mouth 3 (three) times a day as needed for muscle spasms., Disp: 90 tablet, Rfl: 2     diazePAM (VALIUM) 5 MG tablet, Take 1 tablet by mouth as needed., Disp: , Rfl: 0     diclofenac sodium (VOLTAREN) 1 % Gel, Apply topically 4 (four) times a day., Disp: , Rfl:      fluticasone propionate (FLONASE) 50 mcg/actuation nasal spray, Apply 1 spray into each nostril daily., Disp: , Rfl:      furosemide (LASIX) 20 MG tablet, Take 20 mg by mouth 2 (two) times a day., Disp: , Rfl:      [START ON 11/17/2020] HYDROmorphone (DILAUDID) 4 MG tablet, Take 1 tablet (4 mg total) by mouth every 3 (three) hours as needed for pain., Disp: 224 tablet, Rfl: 0     ketamine HCl (KETAMINE, BULK, MISC), into each nostril daily as needed. , Disp: , Rfl:      ketamine HCl (KETAMINE, BULK,) 100 % Powd, 60 mg lozenge one-half lozenge six times a day as needed, Disp: 90 Bottle, Rfl: 2     levothyroxine (SYNTHROID, LEVOTHROID) 112 MCG tablet, Take 112 mcg by mouth 2 (two) times a day., Disp: , Rfl:      mometasone (ASMANEX HFA) 200 mcg/actuation HFAA, Inhale 400 mcg 2 (two) times a day., Disp: 1 Inhaler, Rfl: 11     montelukast (SINGULAIR) 10 mg tablet, Take 20 mg by mouth 2 (two) times a day., Disp: , Rfl:      naloxone (NARCAN) 4  mg/actuation nasal spray, 1 spray (4 mg dose) into one nostril for opioid reversal. Call 911. May repeat if no response in 3 minutes., Disp: 1 Box, Rfl: 0     potassium chloride (K-DUR,KLOR-CON) 20 MEQ tablet, Take 20 mEq by mouth 2 (two) times a day., Disp: , Rfl:      SUMAtriptan (IMITREX) 50 MG tablet, Take 50 mg by mouth every 2 (two) hours as needed. , Disp: , Rfl:      VITAMIN D3 2,000 unit capsule, Take 5,000 Units by mouth daily. , Disp: , Rfl: 1    Personal belongings: Left outside the bathroom.    Comments: Consent signed

## 2021-06-13 NOTE — TELEPHONE ENCOUNTER
Appears patient has cancelled a follow up visit for today, 12/09.  Patient is calling asking for her refill of oxycodone.  She still has not responded to mychart and phone messages regarding bringing the hydromorphone in to the clinic for destruction.

## 2021-06-13 NOTE — TELEPHONE ENCOUNTER
Call placed to patient, voicemail left to bring in remaining hydromorphone for destruction as she is now to be taking oxycodone and not both.  This was left on patient voicemail and to call back as to a time that she can bring this in.

## 2021-06-13 NOTE — TELEPHONE ENCOUNTER
Pt was scheduled for [facility] on 11-11-20, while here was to provide UDT and sign CSA's.  When pt was called to cancel the [facility], she was asked to still come in for the UDT and CSA's, pt stated she may have another appt that day and not sure if it would be a virtual or in person appt, but would call back to let us know when she would be in.  Pt has yet to call back.  Do you want her called to get rescheduled or is this a failed UDT?  Please advise

## 2021-06-13 NOTE — TELEPHONE ENCOUNTER
Patient calls again, requesting the next refill of oxycodone.  Patient has been messaged through my chart as well as a phone call requesting patient to bring in hydromorphone as she was only to  oxycodone.  Patient has not brought in any hydromorphone and has not acknowledged the phone call or my chart message requesting this.

## 2021-06-13 NOTE — TELEPHONE ENCOUNTER
Medication being requested: hydromorphone  Last visit date: 7/27/2020.  Provider: BE  Next visit date: 11/3/2020.  Provider: BE  Expected follow up: 8 weeks  MTM visit (Pain Center) date: no  UDT date: 10/2019 due today 11/13/2020  Agreement date: 10/2019- due today 11/13/2020   (Last fill date; name; strength; provider; MME; quantity):  Last filled 11/3/2020 hydromorphone for 224 tabs, 14 days  Pertinent between visit information about requested medication (telephone, mychart, prior authorization, concerns, comments): missed appt on 9/8/2020, MRI appt today 11/13/2020  Script being sent to provider by nurse- dates and quantity: hydromorphone, 224 tabs for 14 days  Pharmacy cued: [facility] [place]  Standing orders for withdrawal protocol implemented: no

## 2021-06-13 NOTE — TELEPHONE ENCOUNTER
Per provider: patient is to change to oxycodone and no longer taking hydromorphone.  Will need to contact the patient to have bring in hydromorphone.    Patient contacted via [employer] on 12/01 to bring in hydromorphone.

## 2021-06-13 NOTE — TELEPHONE ENCOUNTER
Juan Manuel with patient.  Reviewed that as we change from hydromorphone to oxycodone, not expect she would be picking up the prescription from hydromorphone from the pharmacy.  She indicates that they dispense that to her that she did not notice that until after the change.  Reports she has the number to 24 in her sleep.    Review the chart of safety the policy is that that would be disposed of.  I indicated 1 option is for her to go back and use that dose to await that result the Trupti codon.  She did not want to do this due to the lower extremity edema.  As such she did agree to return the unused part to the pharmacy.   She reviews concern for cost of medications.    We will change the oxycodone from 10 mg to 15 mg every 4 hours.  I will send in a 4-day supply and expect to hear from the pharmacy next week that the hydromorphonehas been returned.    We also discussed scheduling frequency specific microcurrent in the office is available

## 2021-06-13 NOTE — PROGRESS NOTES
Patient here for frequency specific microcurrent.    Reviews since transitioning from the hydromorphone back to the oxycodone lower extremity edema has greatly resolved.    Reviews did have a previous history of lower extremity edema from other agents as well.    We are targeting frequency specific microcurrent to target the most therapy toxicity chemical amoxicillin.    She recalls when having his treatment this summer at the acupuncturist they also targeted lymphedema and she had a very rapid transition of fluid such that developed some atelectasis in the lungs by her description and is sensitive to medications or other treatments.    Pain continues particularly from her low back hips down to her knees.    Total time more than 40 minutes, tolerated well.

## 2021-06-13 NOTE — TELEPHONE ENCOUNTER
Central PA team  [phone]  Pool: HE PA MED ([id number])          PA has been initiated.       PA form completed and faxed insurance via Cover My Meds     Key:   [id number]     Medication:  HYDROmorphone HCl 8MG tablets    Insurance:  [employer]         Response will be received via fax and may take up to 5-10 business days depending on plan

## 2021-06-13 NOTE — TELEPHONE ENCOUNTER
Call back to patient, this will be call #5 between nursing and /scheduling.  No answer/ voicemail left asking patient to call back to the clinic and a vague message about needing to get medications brought in prior to any further refills.

## 2021-06-13 NOTE — TELEPHONE ENCOUNTER
2020 Prior Authorization Request  Who's requesting PA: Pharmacy  Provider: Jamal  Pharmacy Name, Location & Phone # : [facility]/[phone]  Medication Name: hydromorphone 8mg tabs  Quantity: 112/ 14 days  Medication Instructions: Take one tablet by mouth every 3 hours as needed for pain.   Insurance Plan: [employer]  Insurance Member ID & GRP # : [id number]  Informed patient that prior authorizations can take up to 10 business days for response: YES  Okay to leave a detailed message: NO

## 2021-06-13 NOTE — TELEPHONE ENCOUNTER
Medication being requested: Oxycodone  Last visit date: 7/27   Provider: BE  Next visit date: 12/23  Provider: CASI  Expected follow up: 8 weeks  MTM visit (Pain Center) date: fernando  UDT date: 11/2020  Agreement date: 11/2020   (Last fill date; name; strength; provider; MME; quantity): Unable to open the  -Spoke with pharmacist and last rx filled on 12/11 Oxycodone 15 mg, # 24 tablets    Pertinent between visit information about requested medication (telephone, mychart, prior authorization, concerns, comments): Patient cancelled 12/9 appointment.  Received message from pharmacy that patient brought in Hydrocodone for destruction, # 223 tablets destroyed.  Message left on refill line with request for medication refill patient identified only with phone number, returned call to phone number asking patient if she had called a pain clinic for a medication refill she answered yes, when asked who I was speaking with because no name had been given she wanted to know what my name was and informed me that she had placed me on speaker and seemed very irritated by the phone call.    Script being sent to provider by nurse- dates and quantity:   Requested Prescriptions     Pending Prescriptions Disp Refills     oxyCODONE 15 mg TbOr 54 tablet 0     Sig: Take 15 mg by mouth every 4 (four) hours for 9 days.     Pharmacy cued: [facility]  Standing orders for withdrawal protocol implemented: [facility]

## 2021-06-13 NOTE — TELEPHONE ENCOUNTER
Call from pharmacist, Gaurang, asking if Dilaudid 4mg is supposed to be ordered 2 tabs every 3 hours prn or 1 tab every 3 hours prn.  Called back after confirming with Dr Berry dose is 4 mg 2 tabs every 3 hours prn. Ordered this way because insurance required a PA on 8 mg tabs.   Pharmacist states both would require a PA. He is requesting a script without a future fill date as they can't start the PA until the date.   Pt is to come to clinic this afternoon for UDT. Will send new script for 8mg every 3 hours prn after she leaves the urine sample.

## 2021-06-14 ENCOUNTER — COMMUNICATION - HEALTHEAST (OUTPATIENT)
Dept: PALLIATIVE MEDICINE | Facility: OTHER | Age: 61
End: 2021-06-14

## 2021-06-14 DIAGNOSIS — G89.29 OTHER CHRONIC PAIN: ICD-10-CM

## 2021-06-14 NOTE — PROGRESS NOTES
Tisha Arias is a 60 y.o. female who is being evaluated via a billable video visit.      How would you like to obtain your AVS? MyChart.  If dropped from the video visit, the video invitation should be resent by: Text to cell phone: [phone]  Will anyone else be joining your video visit? No      Video Start Time: 4:30 PM    Video-Visit Details    Type of service:  Video Visit    Video End Time (time video stopped): 4:47 PM  Originating Location (pt. 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  Her CTA at the time showed no evidence of PE but however she had a 3 mm small nodule in the lingula.  There was also mild mosaic attenuation. She did have a CT chest on 2/2020 (which we did not have) that was reportedly normal. She did have normal PFT's.     She was diagnosed with non-Hodgkin's lymphoma, diffuse large B-cell lymphoma, back in 2017 and underwent R-CHOP and intrathecal therapy at the [facility]. She has been in remission since 1/2018:     She notes that she has also had history of breast cancer for which she underwent mastectomy and breast reconstruction surgery.  She was on tamoxifen.    She has been started on asmanex by me in the past as she notes significant relief with her wheezing and shortness of breath.     Interval history: no hospitalization since she was last seen. No abx or steroids. She has not used Asmanex for about 3 weeks but felt better when she was on it. She is on albuterol 1-2 times per 1-2 weeks. No  hemoptysis. No weight loss. Can walk about 15-30 minutes.     PMHx/PSHx:  H/o breast cancer s/p mastectomy on the right and breast reconstruction surgery. No chemo   DLBCL NHL dg of  s/p R-CHOP and intrathecal MTX.. in remission since January  S/p gregory-en-Y surgery   Total abdominal hysteroctomy  Allergic rhinitis  Non-Hodgkin's lymphoma stage III  Morbid obesity  Chronic kidney disease  History of   Hysterectomy  Thyroidectomy  Tonsillectomy    Social Hx:    Lifelong non-smoker  Work: occupation health nurse at [employer]  Lives at home    Review of Systems - 10 point review of system negative except for what is mentioned in the HPI.    Meds and Allergies:  See EHR for the updated medication list and Allergies. These were reviewed.     Family History   Problem Relation Age of Onset     Cancer Mother      Hyperlipidemia Mother      Hypertension Mother      Diabetes Father      Heart disease Father      Cancer Brother      Depression Brother      Cancer Maternal Grandmother      Cancer Maternal Grandfather      Heart disease Paternal Grandmother      Hypertension Paternal Grandmother      Heart attack Paternal Grandmother      Diabetes Paternal Grandfather      Heart attack Paternal Grandfather        Exam/Data:     N/a    DATA    PFT DATA 3./2020:  FEV1 97% predicted, FVC 90% predicted, ratio 85.  No reversibility.  TLC 90% predicted, RV 93% predicted, DLCO 145%.  Normal flow volume loop    CT chest dated 10/11/2019:  No pulmonary embolus  Possible new 3 mm pulmonary nodule in the lingula in the region of adjacent subsegmental atelectasis.  Continued attention is recommended on follow-up in this patient with history of malignancy  Mild mosaic attenuation throughout the lung suggestive of small vessel or small airway disease.    CT chest abdomen pelvis on 2020:  No evidence of recurrent, residual, or metastatic disease in the chest, abdomen, or pelvis.  Stable prominent but not enlarged  pelvic lymph nodes.  Stable biliary dilatation.  Resolution of previously demonstrated distal right ureteral stone.  Diverticulosis without diverticulitis.    Assessment/Plan:   Tisha Arias is a 60 y.o. female, lifelong non-smoker, with history of diffuse large B-cell lymphoma status post R-CHOP, history of breast cancer status post mastectomy and tamoxifen therapy, chronic pain who was referred here for evaluation of abnormal CT scan of the chest which was done back on 10/20/2019.  Her CTA at the time showed no evidence of PE but however she had a 3 mm small nodule in the lingula.  There was also mild mosaic attenuation.  She did show me CT report that was done this year which showed no abnormalities.  Patient is a lifelong non-smoker.  Repeat CT chest shows mild atelectasis. I can't see the 3 mm nodules that was there previously.  pft's are normal.    Pt likely has h/o asthma based on history and improvement with inhaled steroids. I had prescribed Asmanex and she notes that she felt better on it.     Recommendations:  - continue albuterol inhaler given air trapping  - encouraged to use Asamanex (clinically c/w asthma)  - consider repeat imaging in the future, nodules have resolved    FOLLOW UP: 6 months    Pauline Smith MD  Pulmonary and Critical Care Medicine  1/12/2021        Allergies   Allergen Reactions     Buprenorphine      Chest pain, GI, cramping     Cymbalta [Duloxetine] Anaphylaxis     Hydromorphone Other (See Comments)     Increased pain, unable to urinate     Nuts - Unspecified Anaphylaxis     Ragweed Pollen Anaphylaxis     Tamoxifen      Bone pain     Topiramate Anaphylaxis     ataxia     Amitriptyline      seratonin syndrome      Baclofen Swelling     Codeine Nausea And Vomiting     Gabapentin Swelling     Hydroxyzine Headache     Cheat pressure, tremors, back pain     Lamotrigine Nausea And Vomiting and Headache     Fever, swollen glands, muscle pain, chest pain, bruising, neck stiffness,  confusion     Lyrica [Pregabalin]      Metaxalone Nausea And Vomiting     Nortriptyline      Other Food Allergy      Melons, bananas, cucumber, zucchini, potatoes, carrots, cherries, celery, apples, pears, plums, peaches, parsnips, kiwi, hazelnuts, apricots     Tree And Shrub Pollen      Birch tree     Buprenorphine-Naloxone Itching, Swelling and Anxiety     chest pain, fatigue     Clonidine Anxiety     tremors       Medications:     Current Outpatient Medications   Medication Sig Dispense Refill     acetylglucosamine, bulk, Powd Use 600 mg As Directed 3 (three) times a day. 90 g 2     albuterol (PROAIR HFA;PROVENTIL HFA;VENTOLIN HFA) 90 mcg/actuation inhaler Inhale 2 puffs every 6 (six) hours as needed for wheezing. 1 Inhaler 4     calcitrioL (ROCALTROL) 0.25 MCG capsule take 2 capsules (0.5 mcg) by mouth in the AM and 1 capsule (0.25 mcg) in the PM. Virtual/video visit requested. Call  to ariel       calcium citrate-vitamin D3 (CITRACAL + D) 315 mg- 250 unit per tablet Take 2 tablets by mouth 3 (three) times a day with meals.       celecoxib (CELEBREX) 200 MG capsule Take 200 mg by mouth 2 (two) times a day.       cetirizine (ZYRTEC) 10 MG tablet Take 10 mg by mouth daily as needed.       cromolyn (NASALCHROM) 5.2 mg/spray (4 %) nasal spray 1 spray into each nostril 4 (four) times a day. 26 mL 12     cyclobenzaprine (FLEXERIL) 10 MG tablet Take 1 tablet (10 mg total) by mouth 3 (three) times a day as needed for muscle spasms. 90 tablet 2     diazePAM (VALIUM) 5 MG tablet Take 1 tablet by mouth as needed.  0     diclofenac sodium (VOLTAREN) 1 % Gel Apply topically 4 (four) times a day.       famotidine (PEPCID) 20 MG tablet Take 1 tablet (20 mg total) by mouth 2 (two) times a day. 60 tablet 3     fluticasone propionate (FLONASE) 50 mcg/actuation nasal spray Apply 1 spray into each nostril daily as needed.        furosemide (LASIX) 20 MG tablet Take 20 mg by mouth 2 (two) times a day.       ketamine  HCl (KETAMINE, BULK, MISC) into each nostril daily as needed.        levothyroxine (SYNTHROID, LEVOTHROID) 112 MCG tablet Take 112 mcg by mouth 2 (two) times a day.       mometasone (ASMANEX HFA) 200 mcg/actuation HFAA Inhale 400 mcg 2 (two) times a day. 1 Inhaler 11     montelukast (SINGULAIR) 10 mg tablet Take 20 mg by mouth 2 (two) times a day.       naloxone (NARCAN) 4 mg/actuation nasal spray 1 spray (4 mg dose) into one nostril for opioid reversal. Call 911. May repeat if no response in 3 minutes. 1 Box 0     NON FORMULARY Take 2 capsules by mouth 3 (three) times a day as needed. PEA supplement       oxyCODONE (ROXICODONE) 10 mg immediate release tablet Take 1 tablet (10 mg total) by mouth every 4 (four) hours as needed for pain. 60 tablet 0     oxyCODONE 15 mg TbOr Take 15 mg by mouth every 4 (four) hours for 14 days. 84 tablet 0     potassium chloride (K-DUR,KLOR-CON) 20 MEQ tablet Take 20 mEq by mouth 2 (two) times a day.       SUMAtriptan (IMITREX) 50 MG tablet Take 50 mg by mouth every 2 (two) hours as needed.        tobramycin (TOBREX) 0.3 % ophthalmic solution ADMINISTER 1 2 DROPS TO THE RIGHT EYE EVERY 4 (FOUR) HOURS FOR 7 DAYS.       VITAMIN D3 2,000 unit capsule Take 5,000 Units by mouth daily.   1     ketamine HCl (KETAMINE, BULK,) 100 % Powd 60 mg lozenge one-half lozenge six times a day as needed 90 Bottle 2     No current facility-administered medications for this visit.        Much or all of the text in this note was generated through the use of the Dragon Dictate voice-to-text software. Errors in spelling or words which seem out of context are unintentional. Sound alike errors, in particular, may have escaped editing.

## 2021-06-14 NOTE — TELEPHONE ENCOUNTER
"Patient left message on triage line for refill of her Oxycodone 20 mg, taking every 4 hours. She said that \"this is better than the 15 but was wishing she could take it every 3 hours\".     Last visit 12/23/20  Next visit: needs scheduling:  Expected follow up: 8 weeks?    1/22  note:  We will change to oxycodone 20 mg every 4 hours 10-day supply and review  Per  last filled 1/24, # 60 tabs, 10 day supply. Refill due date 2/2/21    Requested Prescriptions     Pending Prescriptions Disp Refills     Oxycodone HCl 20 mg Tab 84 each 0     Sig: Take 20 mg by mouth every 4 (four) hours for 14 days.     [facility] pharmacy      "

## 2021-06-14 NOTE — TELEPHONE ENCOUNTER
N acetylglucosamine rx sent to [facility] pharmacy, they do not stock, will resend to [facility] pharmacy

## 2021-06-14 NOTE — PATIENT INSTRUCTIONS - HE
PLAN:    Labs at any [facility] facility    Pepcid 20 mg twice a day in place of Zantac for mast cell activation syndrome.    N acetyl-cysteine ( N-A-C) 600 mg 3 times a day sent as prescription to see if your insurance will cover.    Treated with the frequency specific microcurrent for 1 to 2 weeks as able to schedule.    Continue with opioids as you are

## 2021-06-14 NOTE — PROGRESS NOTES
"Assessment/Plan:     Problem List Items Addressed This Visit     Other chronic pain    Relevant Medications    cyclobenzaprine (FLEXERIL) 10 MG tablet    oxyCODONE 15 mg TbOr (Start on 12/29/2020)    famotidine (PEPCID) 20 MG tablet    acetylglucosamine, bulk, Powd    Other Relevant Orders    Vitamin D, Total (25-Hydroxy) (Completed)    C-Reactive Protein (Completed)    Celiac(Gluten)Antibody Panel (Completed)    Homocysteine (Completed)    Oral allergy syndrome - Primary    Relevant Medications    cromolyn (NASALCHROM) 5.2 mg/spray (4 %) nasal spray            No follow-ups on file.    Patient Instructions   PLAN:    Labs at any [facility] facility    Pepcid 20 mg twice a day in place of Zantac for mast cell activation syndrome.    N acetyl-cysteine ( N-A-C) 600 mg 3 times a day sent as prescription to see if your insurance will cover.    Treated with the frequency specific microcurrent for 1 to 2 weeks as able to schedule.    Continue with opioids as you are            Subjective:       60 y.o. female seen for diffuse pain after chemotherapy.    She reviews her last frequency specific micro treatment, felt that it \"cut one of the cords around my chest\" with some relief which is been continued.    Continues pain in her pelvis, hips, shoulders.  Graph she is try to use hot showers, lying on a ball.    Reviewed when first referred here have been working with a provider around mast cell activation syndrome, who referred to the [facility], that provider than left.  She cannot afford to return.    She is not taking the Zantac, and no other active treatment for the mast cell activation therapy.    Continues with lower extremity swelling.  If she takes Lasix gets cramps.    Recalls with a trial of lamotrigine discussed, her mother went to the heart block so she has been apprehensive about doing that.    She continues with medications finding that the oxymorphone 15 mg every 4 hours, after 2 hours of the root " leave resolves by 3 hours pain is climbing.    She is beginning to take the ketamine 2 hours after each dose beginning to notice some improvement.    Reviewing laboratories, vitamin D level a year ago was 42.  Graph does not have a particular history of GI symptoms aside from occasional cramps.     reviewed, as expected      Current Outpatient Medications:      albuterol (PROAIR HFA;PROVENTIL HFA;VENTOLIN HFA) 90 mcg/actuation inhaler, Inhale 2 puffs every 6 (six) hours as needed for wheezing., Disp: 1 Inhaler, Rfl: 4     calcitrioL (ROCALTROL) 0.25 MCG capsule, take 2 capsules (0.5 mcg) by mouth in the AM and 1 capsule (0.25 mcg) in the PM. Virtual/video visit requested. Call  to ariel, Disp: , Rfl:      calcium citrate-vitamin D3 (CITRACAL + D) 315 mg- 250 unit per tablet, Take 2 tablets by mouth 3 (three) times a day with meals., Disp: , Rfl:      celecoxib (CELEBREX) 200 MG capsule, Take 200 mg by mouth 2 (two) times a day., Disp: , Rfl:      cetirizine (ZYRTEC) 10 MG tablet, Take 10 mg by mouth daily as needed., Disp: , Rfl:      cyclobenzaprine (FLEXERIL) 10 MG tablet, Take 1 tablet (10 mg total) by mouth 3 (three) times a day as needed for muscle spasms., Disp: 90 tablet, Rfl: 2     diazePAM (VALIUM) 5 MG tablet, Take 1 tablet by mouth as needed., Disp: , Rfl: 0     diclofenac sodium (VOLTAREN) 1 % Gel, Apply topically 4 (four) times a day., Disp: , Rfl:      fluticasone propionate (FLONASE) 50 mcg/actuation nasal spray, Apply 1 spray into each nostril daily as needed. , Disp: , Rfl:      ketamine HCl (KETAMINE, BULK,) 100 % Powd, 60 mg lozenge one-half lozenge six times a day as needed, Disp: 90 Bottle, Rfl: 2     levothyroxine (SYNTHROID, LEVOTHROID) 112 MCG tablet, Take 112 mcg by mouth 2 (two) times a day., Disp: , Rfl:      magnesium oxide (MAG-OX) 400 mg (241.3 mg magnesium) tablet, Take 1 tablet (400 mg total) by mouth 3 (three) times a day., Disp: 90 tablet, Rfl: 0     mometasone  (ASMANEX HFA) 200 mcg/actuation HFAA, Inhale 400 mcg 2 (two) times a day., Disp: 1 Inhaler, Rfl: 11     montelukast (SINGULAIR) 10 mg tablet, Take 20 mg by mouth 2 (two) times a day., Disp: , Rfl:      NON FORMULARY, Take 2 capsules by mouth 3 (three) times a day as needed. PEA supplement, Disp: , Rfl:      [START ON 12/29/2020] oxyCODONE 15 mg TbOr, Take 15 mg by mouth every 4 (four) hours for 14 days., Disp: 84 tablet, Rfl: 0     potassium chloride (K-DUR,KLOR-CON) 20 MEQ tablet, Take 20 mEq by mouth 2 (two) times a day., Disp: , Rfl:      SUMAtriptan (IMITREX) 50 MG tablet, Take 50 mg by mouth every 2 (two) hours as needed. , Disp: , Rfl:      tobramycin (TOBREX) 0.3 % ophthalmic solution, ADMINISTER 1 2 DROPS TO THE RIGHT EYE EVERY 4 (FOUR) HOURS FOR 7 DAYS., Disp: , Rfl:      VITAMIN D3 2,000 unit capsule, Take 5,000 Units by mouth daily. , Disp: , Rfl: 1     acetylglucosamine, bulk, Powd, Use 600 mg As Directed 3 (three) times a day., Disp: 90 g, Rfl: 2     cromolyn (NASALCHROM) 5.2 mg/spray (4 %) nasal spray, 1 spray into each nostril 4 (four) times a day., Disp: 26 mL, Rfl: 12     famotidine (PEPCID) 20 MG tablet, Take 1 tablet (20 mg total) by mouth 2 (two) times a day., Disp: 60 tablet, Rfl: 3     furosemide (LASIX) 20 MG tablet, Take 20 mg by mouth 2 (two) times a day., Disp: , Rfl:      ketamine HCl (KETAMINE, BULK, MISC), into each nostril daily as needed. , Disp: , Rfl:      naloxone (NARCAN) 4 mg/actuation nasal spray, 1 spray (4 mg dose) into one nostril for opioid reversal. Call 911. May repeat if no response in 3 minutes., Disp: 1 Box, Rfl: 0     oxyCODONE (ROXICODONE) 10 mg immediate release tablet, Take 1 tablet (10 mg total) by mouth every 4 (four) hours as needed for pain., Disp: 60 tablet, Rfl: 0    Is alert with a clear sensorium good eye contact.  Thought process tight logical.  Psychomotor retarded.          Objective:     Vitals:    12/23/20 1246   BP: (!) 187/89   Pulse: (!) 118   Weight:  "217 lb (98.4 kg)   Height: 5' 5\" (1.651 m)   PainSc: 10-Worst pain ever   PainLoc: Back       Essman: Chronic pain relates to diffuse pain after tamoxifen.  Also thought to have associated mast cell activation syndrome.      Back: Discussed the addition of N-acetylcysteine, and reviewing in H1 blocker for the mast cell activation.    We will review some laboratories including vitamin D level.    Treatment with frequency specific microcurrent obtained today targeting pain related to chemical exposure.    Total time more than 25 minutes.    This note has been dictated using voice recognition software. Any grammatical or context distortions are unintentional and inherent to the software  "

## 2021-06-14 NOTE — TELEPHONE ENCOUNTER
Refill request from [facility] pharmacy for Ketamine.  Ketamine last filled on 1/14, 30 day supply, refill due on 2/13  per     Last visit: 12/23  Expected follow up: none noted in last visit note  Next visit: non scheduled  UDT/CSA 11/2020    She is beginning to take the ketamine 2 hours after each dose beginning to notice some improvement.

## 2021-06-14 NOTE — TELEPHONE ENCOUNTER
Central PA team  [phone]  Pool: HE PA MED ([id number])          PA has been initiated.       PA form completed and faxed insurance via Cover My Meds     Huang:  LUCAS LOVETT (Huang: [id number]) - LMM     Medication:  fv-acetyl-d-glucosamine 600mg cap, 3/day    Insurance:  [employer]        Response will be received via fax and may take up to 5-10 business days depending on plan

## 2021-06-14 NOTE — TELEPHONE ENCOUNTER
2020 Prior Authorization Request  Who's requesting PA: Pharmacy  Provider: Jamal  Pharmacy Name, Location & Phone # : [facility]/[phone]  Medication Name: fv-acetyl-d-glucosamine 600mg cap, 3/day  Quantity: 90  Refills:  Days Supply: 30  Medication Instructions: one cap 3 times a day  Insurance Plan: MEDICA  Insurance Member ID & GRP # : [id number]  CoverMyMeds Key:  Informed patient that prior authorizations can take up to 10 business days for response: YES  Okay to leave a detailed message: NO    Route to: HE PA MED ([id number])

## 2021-06-14 NOTE — PROGRESS NOTES
Patient is here for a follow up appointment with Dr. Berry. Patient has upper back, bilateral hips, bilateral shoulders, pelvic and sternum pain, describes the pain as constant, crushing and throbbing,rates the pain as 10/10. Patient needs refills for Flexeril and Oxycodone.  Functionality is 8. Patient states their pain interferes with sleep, walking, work, ADL's and relationships.

## 2021-06-15 NOTE — PROGRESS NOTES
Tisha Arias is a 60 y.o. female who is being evaluated via a billable video visit.      How would you like to obtain your AVS? MyChart.  If dropped from the video visit, the video invitation should be resent by: Text to cell phone: [phone]  Will anyone else be joining your video visit? No  Pain score: 9  Constant or intermittent: constant and intermittent  What does your pain feel like: throbbing,deep ache, dull, crushing, movement intensifies pain  Does the pain interfere with:   Work: yes  Walking/distance: yes  Sleep: yes  Daily activities: yes  Relationships/social life: yes  Mood: yes  F= 8    Maine Ryan LPN

## 2021-06-15 NOTE — TELEPHONE ENCOUNTER
Medication being requested: Oxycodone  Last visit date: 12/23  Provider: CASI  Next visit date: 3/9.  Provider: CASI  Expected follow up: none noted  MTM visit (Pain Center) date: fernando  UDT date: 11/2020  Agreement date: 11/2020   (Last fill date; name; strength; provider; MME; quantity):  reviewed:  Last filled 2/2, # 84, 14 day supply, refill due date 2/16    Pertinent between visit information about requested medication (telephone, mychart, prior authorization, concerns, comments): na  Script being sent to provider by nurse- dates and quantity:   Requested Prescriptions     Pending Prescriptions Disp Refills     Oxycodone HCl 20 mg Tab 84 each 0     Sig: Take 20 mg by mouth every 4 (four) hours for 14 days.     Pharmacy cued: [facility]  Standing orders for withdrawal protocol implemented: fernando

## 2021-06-15 NOTE — PROGRESS NOTES
"Assessment/Plan:     Problem List Items Addressed This Visit     Other chronic pain    Relevant Medications    Oxycodone HCl 20 mg Tab    magnesium oxide 400 mg magnesium Tab    cyclobenzaprine (FLEXERIL) 10 MG tablet    ketamine HCl (KETAMINE, BULK,) 100 % Powd    History of migraine headaches - Primary    Relevant Orders    Ambulatory referral for Acupuncture            No follow-ups on file.    Patient Instructions   PLAN:  Increase ketamine to 80 mg lozenges, 1/2 lozenge 5 times a day and try 1 at night to see if helps sleep    Continue oxycodone 20 mg every 4 hours    Add N-Acetyl-Cysteine 600 mg 3 times a day to augment opioids, and help with concern for mast cell activation syndrome.    Continue vitamin D supplementation.    Discussed you may look into insurance to help obtain a home frequency specific microcurrent device.    Follow-up with Dr. Berry 8 to 12 weeks        Subjective:       60 y.o. female Tisha Arias is a 60 y.o. female who is being evaluated via a billable video visit.      How would you like to obtain your AVS? MyChart.  If dropped from the video visit, the video invitation should be resent by:   Will anyone else be joining your video visit?       Video Start Time:         Subjective   Tisha Arias is 60 y.o. and presents today for the following health issues HPIfollow-up for diffuse pain after use of tamoxifen, and may be associate with mast cell activation syndrome.    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  She reviews not working for 3 years, did not qualify for disability with significant financial stress.  As she is trying to work flares up pain in her feet, spasm and cramps throughout her thighs it hard to walk, abdomen feels \"torn apart\".  Has had increase in her migraines.    The increase in the oxycodone to 20 mg every 4 hours was doing better until she started working.  Now significantly impaired sleep less than 5 " hours a night.    She continues with ketamine 60 mg lozenges, 1/2 lozenge 6 times a day with some benefit, no side effects has not tried higher dose.    Has not had time to come in for frequency specific microcurrent.    A vitamin D level was 26.3 and she is taking replacement.    She did not get the in acetal cystine, is taking Pepcid.    She is interested in acupuncture though has limited time.      Current Outpatient Medications:      albuterol (PROAIR HFA;PROVENTIL HFA;VENTOLIN HFA) 90 mcg/actuation inhaler, Inhale 2 puffs every 6 (six) hours as needed for wheezing., Disp: 1 Inhaler, Rfl: 4     calcitrioL (ROCALTROL) 0.25 MCG capsule, take 2 capsules (0.5 mcg) by mouth in the AM and 1 capsule (0.25 mcg) in the PM. Virtual/video visit requested. Call  to ariel, Disp: , Rfl:      calcium citrate-vitamin D3 (CITRACAL + D) 315 mg- 250 unit per tablet, Take 2 tablets by mouth 3 (three) times a day with meals., Disp: , Rfl:      celecoxib (CELEBREX) 200 MG capsule, Take 200 mg by mouth 2 (two) times a day., Disp: , Rfl:      cetirizine (ZYRTEC) 10 MG tablet, Take 10 mg by mouth daily as needed., Disp: , Rfl:      cromolyn (NASALCHROM) 5.2 mg/spray (4 %) nasal spray, 1 spray into each nostril 4 (four) times a day., Disp: 26 mL, Rfl: 12     cyclobenzaprine (FLEXERIL) 10 MG tablet, Take 1 tablet (10 mg total) by mouth 3 (three) times a day as needed for muscle spasms., Disp: 90 tablet, Rfl: 2     diazePAM (VALIUM) 5 MG tablet, Take 1 tablet by mouth as needed., Disp: , Rfl: 0     diclofenac sodium (VOLTAREN) 1 % Gel, Apply topically 4 (four) times a day., Disp: , Rfl:      famotidine (PEPCID) 20 MG tablet, Take 1 tablet (20 mg total) by mouth 2 (two) times a day., Disp: 60 tablet, Rfl: 3     fluticasone propionate (FLONASE) 50 mcg/actuation nasal spray, Apply 1 spray into each nostril daily as needed. , Disp: , Rfl:      furosemide (LASIX) 20 MG tablet, Take 20 mg by mouth 2 (two) times a day., Disp: , Rfl:       ketamine HCl (KETAMINE, BULK,) 100 % Powd, 80 mg lozenge one-half lozenge 5 times a day, and one lozenge bedtime, Disp: 90 Bottle, Rfl: 2     levothyroxine (SYNTHROID, LEVOTHROID) 112 MCG tablet, Take 112 mcg by mouth 2 (two) times a day., Disp: , Rfl:      magnesium oxide 400 mg magnesium Tab, Take 1 tablet by mouth 3 (three) times a day., Disp: 90 tablet, Rfl: 0     mometasone (ASMANEX HFA) 200 mcg/actuation HFAA, Inhale 400 mcg 2 (two) times a day., Disp: 1 Inhaler, Rfl: 11     montelukast (SINGULAIR) 10 mg tablet, Take 20 mg by mouth 2 (two) times a day., Disp: , Rfl:      naloxone (NARCAN) 4 mg/actuation nasal spray, 1 spray (4 mg dose) into one nostril for opioid reversal. Call 911. May repeat if no response in 3 minutes., Disp: 1 Box, Rfl: 0     NON FORMULARY, Take 2 capsules by mouth 3 (three) times a day as needed. PEA supplement, Disp: , Rfl:      ondansetron (ZOFRAN-ODT) 8 MG disintegrating tablet, Take 1 tablet by mouth every 8 (eight) hours as needed., Disp: , Rfl:      Oxycodone HCl 20 mg Tab, Take 20 mg by mouth every 4 (four) hours for 14 days., Disp: 84 each, Rfl: 0     potassium chloride (K-DUR,KLOR-CON) 20 MEQ tablet, Take 20 mEq by mouth 2 (two) times a day., Disp: , Rfl:      SUMAtriptan (IMITREX) 50 MG tablet, Take 50 mg by mouth every 2 (two) hours as needed. , Disp: , Rfl:      tobramycin (TOBREX) 0.3 % ophthalmic solution, ADMINISTER 1 2 DROPS TO THE RIGHT EYE EVERY 4 (FOUR) HOURS FOR 7 DAYS., Disp: , Rfl:      tobramycin-dexamethasone (TOBRADEX) ophthalmic solution, INSTILL ONE DROP INTO BOTH EYES THREE TIMES DAILY, Disp: , Rfl:      VITAMIN D3 2,000 unit capsule, Take 5,000 Units by mouth daily. , Disp: , Rfl: 1     acetylglucosamine, bulk, Powd, Use 600 mg As Directed 3 (three) times a day., Disp: 90 g, Rfl: 2  By video alert, clear sensorium.  Thought process logical.  Affect is congruent.    No respiratory distress.      Additional provider notes:     Review of Systems       "  Objective    Vitals - Patient Reported  Pain Score:   9  Pain Loc: Back(back, hips, migrain, headache, shoulders, pelvic)    Physical Exam              Video-Visit Details    Type of service:  Video Visit    Video End Time (time video stopped):   Originating Location (pt. 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  Has had been on a variety of neuropathic agents, opioids, other interventions.    She is attempted to return back to work with a struggle.    Plan: Increase the ketamine to 80 mg lozenge, 1/2 lozenge 5 times a day and try higher doses at night to help sleep.    Continue the opioids as above.    Add in an acetylcysteine to the mix pharmacy.    Referral made to acupuncture.    She will look into possibility obtaining a frequency specific microcurrent device to her insurance.     reviewed, as expected.  Last urine drug test in November as expected.    Total time more than 20 minutes.          This note has been dictated using voice recognition software. Any grammatical or context distortions are unintentional and inherent to the software  "

## 2021-06-15 NOTE — PATIENT INSTRUCTIONS - HE
PLAN:  Increase ketamine to 80 mg lozenges, 1/2 lozenge 5 times a day and try 1 at night to see if helps sleep    Continue oxycodone 20 mg every 4 hours    Add N-Acetyl-Cysteine 600 mg 3 times a day to augment opioids, and help with concern for mast cell activation syndrome.    Continue vitamin D supplementation.    Discussed you may look into insurance to help obtain a home frequency specific microcurrent device.    Referral made for acupuncture at the pain center.    Follow-up with Dr. Berry 8 to 12 weeks

## 2021-06-15 NOTE — TELEPHONE ENCOUNTER
"Pt called requesting \"full\" Oxycodone script even though she has an appt tomorrow due to work hour. She was given a bridge until 03/10. Left  ms for pt stating she would have to wait until her appt. Also asking if she can have her appt between 1:30 and 2:30 when on her lunch break.  agreed to call the pt to offer 1340 appt  "

## 2021-06-15 NOTE — TELEPHONE ENCOUNTER
Medication being requested: Oxycodone, Flexeril (not due until 3/23/21) and Magnesium   Last visit date: 12/23/20.  Provider: BE  Next visit date: 3/9/21.  Provider: BE  Expected follow up: none noted on AVS  MTM visit (Pain Center) date: No  UDT date: 11/2020  Agreement date: 11/2020   (Last fill date; name; strength; provider; MME; quantity):  02/16/2021 Oxycodone Hcl 20 Mg Tablet Qty: 84 for 14 days [facility]   Pertinent between visit information about requested medication (telephone, mychart, prior authorization, concerns, comments): No  Script being sent to provider by nurse- dates and quantity:   Requested Prescriptions     Pending Prescriptions Disp Refills     Oxycodone HCl 20 mg Tab 48 each 0     Sig: Take 20 mg by mouth every 4 (four) hours for 8 days.     magnesium oxide (MAG-OX) 400 mg (241.3 mg magnesium) tablet 90 tablet 0     Sig: Take 1 tablet (400 mg total) by mouth 3 (three) times a day.     Pharmacy cued: [facility]  Standing orders for withdrawal protocol implemented: ROBB

## 2021-06-16 NOTE — TELEPHONE ENCOUNTER
Telephone Encounter by Sonia Morales RN at 2019 12:24 PM     Author: Sonia Morales RN Service: -- Author Type: Registered Nurse    Filed: 2019 12:52 PM Encounter Date: 2019 Status: Signed    : Sonia Morales RN (Registered Nurse)       Medication being requested:Flexeril(refill available) Ketamine, Hydrocodone  Last visit date: 10/23  Provider: Jamal  Next visit date: 12/10  Provider: Jamal  Expected follow up: 8 weeks  MTM visit (Pain Center) date:   UDT date: 10/2019  Agreement date: 10/2019   snipped in:    Pertinent between visit information about requested medication (telephone, mychart, prior authorization, concerns, comments): -ED encounter for ureteral stone  Script being sent to provider by nurse- dates and quantity:   Requested Prescriptions     Pending Prescriptions Disp Refills   ? HYDROcodone-acetaminophen (NORCO )  mg per tablet 84 tablet 0     Sig: Take 1 tablet by mouth every 4 (four) hours as needed for pain. Fill date of    ? ketamine HCl (KETAMINE, BULK,) 100 % Powd 30 Bottle 0     Si mg troches. Use 0.5 katharina Q6H PRN. Dispense #30     Pharmacy cued: [facility]  Standing orders for withdrawal protocol implemented: na

## 2021-06-16 NOTE — TELEPHONE ENCOUNTER
Telephone Encounter by Sonia Morales RN at 12/23/2019  3:22 PM     Author: Sonia Morales RN Service: -- Author Type: Registered Nurse    Filed: 12/23/2019  3:34 PM Encounter Date: 12/23/2019 Status: Signed    : Sonia Morales RN (Registered Nurse)       Medication being requested: Flexeril, Ketamine, Oxycodone, Magnesium  Last visit date: 12/10   Provider: BE  Next visit date: 2/3   Provider: BE  Expected follow up: 8 weeks  MTM visit (Pain Center) date: na  UDT date:  10/19  Agreement date: 10/19   snipped in:      Pertinent between visit information about requested medication (telephone, mychart, prior authorization, concerns, comments): na  Script being sent to provider by nurse- dates and quantity:   Requested Prescriptions     Pending Prescriptions Disp Refills   ? cyclobenzaprine (FLEXERIL) 10 MG tablet 45 tablet 1     Sig: Take 1 tablet (10 mg total) by mouth 3 (three) times a day as needed.   ? magnesium oxide (MAG-OX) 400 mg (241.3 mg magnesium) tablet 30 tablet 1     Sig: Take 1 tablet (400 mg total) by mouth daily.   ? oxyCODONE (ROXICODONE) 10 mg immediate release tablet 84 tablet 0     Sig: Take 1 tablet (10 mg total) by mouth every 4 (four) hours as needed for pain.     Pharmacy cued: [facility]  Standing orders for withdrawal protocol implemented: fernando

## 2021-06-16 NOTE — TELEPHONE ENCOUNTER
Central PA team  [phone]  Pool: HE PA MED ([id number])          PA has been initiated.       PA form completed and faxed insurance via Cover My Meds     Key:  [id number] - PA Case ID: [id number]     Medication:  oxyCODONE HCl 20MG tablets    Insurance:  [employer]        Response will be received via fax and may take up to 5-10 business days depending on plan

## 2021-06-16 NOTE — TELEPHONE ENCOUNTER
A message was left on 3/10/21 and also today 3/22/21 for the patient to call back and schedule an appointment.

## 2021-06-16 NOTE — TELEPHONE ENCOUNTER
Message left with patient regarding prior authorization for oxycodone 20 mg will only cover 3 times a day not 6 times a day

## 2021-06-16 NOTE — TELEPHONE ENCOUNTER
"Medication requested: Oxycodone 20 mg  LV 3/9  NV  Needs scheduling  MTM na  UDT/CSA  11/2020  Expected follow up  8-12 weeks    Pertinent info:Continue oxycodone 20 mg every 4 hours    Oxycodone HCl 20 mg Tab 84 each 0 3/9/2021 3/23/2021   Take 20 mg by mouth every 4 (four) hours for 14 days. - Oral   Notes to Pharmacy: Chronic pain; Fill 3/9 for for use 3/9-3/23/2021     Patient message left: \"the changes made last appointment aren't helping\"     not responding    Requested Prescriptions     Pending Prescriptions Disp Refills     Oxycodone HCl 20 mg Tab 84 each 0     Sig: Take 20 mg by mouth every 4 (four) hours for 14 days.       Pharmacy: [facility]    "

## 2021-06-16 NOTE — TELEPHONE ENCOUNTER
Medication being requested: Oxycodone  Last visit date: 3/9  Provider: CASI  Next visit date: 5/5 .  Provider: CASI  Expected follow up: 8-12 weeks  MTM visit (Pain Center) date: fernando  UDT date: 11/2020  Agreement date: 11/2020   (Last fill date; name; strength; provider; MME; quantity):  reviewed:  Last filled 4/8, # 84 tablets, 14 ay supply, refill due date 4/22    Pertinent between visit information about requested medication (telephone, mychart, prior authorization, concerns, comments): 3/9 note:  Continue oxycodone 20 mg every 4 hours  Script being sent to provider by nurse- dates and quantity:   Requested Prescriptions     Pending Prescriptions Disp Refills     Oxycodone HCl 20 mg Tab 84 each 0     Sig: Take 20 mg by mouth every 4 (four) hours for 14 days.     Pharmacy cued: [facility]  Standing orders for withdrawal protocol implemented: fernando

## 2021-06-16 NOTE — TELEPHONE ENCOUNTER
"Telephone Encounter by Anita Allred RN at 3/17/2020  3:34 PM     Author: Anita Allred RN Service: -- Author Type: Registered Nurse    Filed: 3/17/2020  3:44 PM Encounter Date: 3/17/2020 Status: Signed    : Anita Allred RN (Registered Nurse)       Medication being requested: Oxycodone  Last visit date: 2/3/20  Provider: BE  Next visit date: 4/10/20  Provider: BE  Expected follow up: 8 weeks  MTM visit ([facility]) date: No  UDT date: 10/2019  Agreement date: 10/2019   snipped in:    Pertinent between visit information about requested medication (telephone, mychart, prior authorization, concerns, comments):   Patient left a message stating that she needs a refill of her Oxycodone, but also with a raised voice stated \"IT IS NOT WORKING\", patients states that the pain has increased since starting the therapy that Dr. Berry recommended and would like something more. Patient would like a return call not from the nurse line.  Script being sent to provider by nurse- dates and quantity:   Requested Prescriptions     Pending Prescriptions Disp Refills   ? oxyCODONE (ROXICODONE) 10 mg immediate release tablet 84 tablet 0     Sig: Take 1 tablet (10 mg total) by mouth every 4 (four) hours as needed for pain.     Pharmacy cued: [facility]  Standing orders for withdrawal protocol implemented: NA         "

## 2021-06-16 NOTE — TELEPHONE ENCOUNTER
Telephone Encounter by Dhara Sims RN at 3/6/2020  9:53 AM     Author: Dhara Sims RN Service: -- Author Type: Registered Nurse    Filed: 3/6/2020  9:54 AM Encounter Date: 3/6/2020 Status: Signed    : Dhara Sims RN (Registered Nurse)       Medication being requested: oxycodone 10 mg  Last visit date: 2/3  Provider: BE  Next visit date: 4/10  Provider: BE  Expected follow up: 8 weeks  MTM visit ([facility]) date: no  UDT/CSA 10/2019   snipped in:    Pertinent between visit information about requested medication (telephone, mychart, prior authorization, concerns, comments): multiple refill request  Script being sent to provider by nurse- dates and quantity:   Requested Prescriptions     Pending Prescriptions Disp Refills   ? oxyCODONE (ROXICODONE) 10 mg immediate release tablet 84 tablet 0     Sig: Take 1 tablet (10 mg total) by mouth every 4 (four) hours as needed for pain.     Pharmacy cued: [facility]  Standing orders for withdrawal protocol implemented: ROBB

## 2021-06-16 NOTE — TELEPHONE ENCOUNTER
Telephone Encounter by Anita Allred RN at 1/22/2020 12:27 PM     Author: Anita Allred RN Service: -- Author Type: Registered Nurse    Filed: 1/22/2020 12:36 PM Encounter Date: 1/22/2020 Status: Signed    : Anita Allred RN (Registered Nurse)       Medication being requested: Oxycodone and Flexeril  Last visit date: 12/10/19.  Provider: BE  Next visit date: 2/3/20.  Provider: BE  Expected follow up: 8 weeks  MTM visit ([facility] Center) date: No  UDT date: 10/2019  Agreement date: 10/2019   snipped in:    Pertinent between visit information about requested medication (telephone, mychart, prior authorization, concerns, comments):   Patient called for Ketamine as well, but not due until 2/8/2020 so did not cue  Flexeril last ordered  12/23, 45 tabs + 1 refill  Script being sent to provider by nurse- dates and quantity:   Requested Prescriptions     Pending Prescriptions Disp Refills   ? oxyCODONE (ROXICODONE) 10 mg immediate release tablet 84 tablet 0     Sig: Take 1 tablet (10 mg total) by mouth every 4 (four) hours as needed for pain.   ? cyclobenzaprine (FLEXERIL) 10 MG tablet 45 tablet 1     Sig: Take 1 tablet (10 mg total) by mouth 3 (three) times a day as needed.     Pharmacy cued: [facility]   Standing orders for withdrawal protocol implemented: NA

## 2021-06-16 NOTE — TELEPHONE ENCOUNTER
"Telephone Encounter by Sonia Morales RN at 1/6/2020  2:17 PM     Author: Sonia Morales RN Service: -- Author Type: Registered Nurse    Filed: 1/6/2020  2:36 PM Encounter Date: 1/6/2020 Status: Signed    : Sonia Morales RN (Registered Nurse)       Medication being requested: Oxycodone, Flexeril, and Magnesium  Last visit date: 12/10   Provider: BE  Next visit date: 2/3   Provider: BE  Expected follow up: 8 weeks  MTM visit ([facility]) date: 11/18  UDT date: 10/19  Agreement date: 10/19   snipped in:      Pertinent between visit information about requested medication (telephone, mychart, prior authorization, concerns, comments): Patient states that she has been taking 3 Magnesium tablets per day \" for months\".   Script being sent to provider by nurse- dates and quantity:   Requested Prescriptions     Pending Prescriptions Disp Refills   ? oxyCODONE (ROXICODONE) 10 mg immediate release tablet 84 tablet 0     Sig: Take 1 tablet (10 mg total) by mouth every 4 (four) hours as needed for pain.     Pharmacy cued: [facility]  Standing orders for withdrawal protocol implemented: na       "

## 2021-06-16 NOTE — TELEPHONE ENCOUNTER
Telephone Encounter by Dhara Sims RN at 2019 10:29 AM     Author: Dhara Sims RN Service: -- Author Type: Registered Nurse    Filed: 2019 10:37 AM Encounter Date: 2019 Status: Signed    : Dhara Sims RN (Registered Nurse)       Hydrocodone 10/325 mg, Flexeril(not necessary at this time) and ketamine 20 mg  Last visit date: 10/23  Provider: BE  MTM visit on   MTM visit on   Next visit date: 12/10  Provider: BE  Expected follow up: 8 weeks  MTM visit ([facility]) date: visits as listed above  UDT/CSA 10/2019       Pertinent between visit information about requested medication (telephone, mychart, prior authorization, concerns, comments): NA  Script being sent to provider by nurse- dates and quantity:   Requested Prescriptions     Pending Prescriptions Disp Refills   ? ketamine HCl (KETAMINE, BULK,) 100 % Powd 30 Bottle 1     Si mg troches. Use 1 katharina Q6H PRN. Dispense #30   ? HYDROcodone-acetaminophen (NORCO )  mg per tablet 84 tablet 0     Sig: Take 1 tablet by mouth every 4 (four) hours as needed for pain.     Pharmacy cued: [facility]  Standing orders for withdrawal protocol implemented: ROBB

## 2021-06-16 NOTE — TELEPHONE ENCOUNTER
"Telephone Encounter by Dhara Sims RN at 3/11/2020 12:40 PM     Author: Dhara Sims RN Service: -- Author Type: Registered Nurse    Filed: 3/11/2020 12:53 PM Encounter Date: 3/6/2020 Status: Signed    : Dhara Sims RN (Registered Nurse)       Call back to patient:  She reviews that there are issues with her refill  dates.  Reviewed our practices with refills and how we manage this type of medications.  Patient is short with writer and states \"obviously you do not know what I need and you dont understand\"  Patient states \"I am supposed to tell you about my increases in pain in between office visits\"  Reviewed that this should be happening at the office visits with the provider.  Patient requested to end the conversation and stated \"you dont understand\"    Will pass message along to the provider as the  dates are based on patient safety and when they last picked up and this is not something we are able to adjust.    Patient last picked up on 2/26-14 day supply  Next refill set up for 3/11 which is appropriate           "

## 2021-06-16 NOTE — TELEPHONE ENCOUNTER
Telephone Encounter by Sonia Morales RN at 2/19/2020  9:34 AM     Author: Sonia Morales RN Service: -- Author Type: Registered Nurse    Filed: 2/19/2020  9:44 AM Encounter Date: 2/19/2020 Status: Signed    : Sonia Morales RN (Registered Nurse)       Medication being requested: Oxycodone  Last visit date: 2/3   Provider: BE  Next visit date: 4/10  Provider: [facility]  Expected follow up: 8 weeks  MTM visit ([facility] Center) date: na  UDT date: 10/2019  Agreement date: 10/2019   snipped in:    Pertinent between visit information about requested medication (telephone, mychart, prior authorization, concerns, comments): 2/3 appointment:  Ketamine 30 mg every four hours alternating with oxycodone 10 mg every  Four hours  Script being sent to provider by nurse- dates and quantity:   Requested Prescriptions     Pending Prescriptions Disp Refills   ? oxyCODONE (ROXICODONE) 10 mg immediate release tablet 84 tablet 0     Sig: Take 1 tablet (10 mg total) by mouth every 4 (four) hours as needed for pain.     Pharmacy cued: [facility]  Standing orders for withdrawal protocol implemented: na

## 2021-06-16 NOTE — TELEPHONE ENCOUNTER
Prior Authorization Request  Who s requesting:  Pharmacy  Pharmacy Name and Location: [facility]  Medication Name: Oxycodone HCl 20 mg Tab  Insurance Plan: [employer]  Insurance Member ID Number:  [id number]  CoverMyMeds Key: n/a  Informed patient that prior authorizations can take up to 10 business days for response:   yes  Okay to leave a detailed message: yes

## 2021-06-17 NOTE — TELEPHONE ENCOUNTER
"Telephone Encounter by Anita Allred RN at 9/22/2020  2:10 PM     Author: Anita Allred RN Service: -- Author Type: Registered Nurse    Filed: 9/22/2020  2:18 PM Encounter Date: 9/22/2020 Status: Signed    : Anita Allred RN (Registered Nurse)       Medication being requested: Dilaudid  Last visit date: 7/27/20  Provider: BE  Next visit date: 11/3/20  Provider: BE  Expected follow up: 8 weeks  MTM visit (Pain Center) date: No  UDT date: 10/2019  Agreement date: 10/2019   (Last fill date; name; strength; provider; MME; quantity):  Dilaudid last filled 9/15, Qty: 56 for 7 days so would be due today  Pertinent between visit information about requested medication (telephone, mychart, prior authorization, concerns, comments):   Telephone encounter started 9/14  Татьяна Hainse RN           3:13 PM   Note      Patient left message this afternoon, wondering why she is getting less Dilaudid. Has not been discussed and not happy, because, \"my pain is off the charts\".     Patient had last OV, on 7/27.   Had a NoShow on 9/8. Now not rescheduled until 11/3. Dr Berry started taper, per Dr Berry note  9/11/20 3:58 PM   Pt did not show for her appt, pts may be placed on tapers and shorter scripts when don't keep appts   [facility]     I spoke with patient just now. I explained the above in detail.  Patient was unhappy that she is being reduced \"drastically. She said that 8 per day is not working. She also said, \"what am I supposed to do, just put up with it\".      Patient is on a waiting list for appt sooner than 11/3. She was not happy with my message, and wanted me to document, \"I am not arguing with you, so don't write that down\".         Script being sent to provider by nurse- dates and quantity: defer to provider due to NO SHOW and reduced refills with no set tapering schedule.  [facility] cued: [facility]  Standing orders for withdrawal protocol implemented: " NA

## 2021-06-17 NOTE — TELEPHONE ENCOUNTER
Telephone Encounter by Dhara Sims RN at 6/30/2020  8:34 AM     Author: Dhara Sims RN Service: -- Author Type: Registered Nurse    Filed: 6/30/2020  8:40 AM Encounter Date: 6/30/2020 Status: Signed    : Dhara Sims RN (Registered Nurse)       Medication being requested: hydromorphone 4 mg  Last visit date: 5/26  Provider: BE  Next visit date: 7/27  Provider: BE  Expected follow up: 8 weeks  MTM visit ([facility] Center) date: no  UDT/CSA 10/2019   snipped in:    Pertinent between visit information about requested medication (telephone, mychart, prior authorization, concerns, comments): PA-6/11, determined not necessary  Script being sent to provider by nurse- dates and quantity:   Requested Prescriptions     Pending Prescriptions Disp Refills   ? HYDROmorphone (DILAUDID) 4 MG tablet 160 tablet 0     Sig: One to two tabs every three hours as needed     Pharmacy cued: [facility]  Standing orders for withdrawal protocol implemented: ROBB

## 2021-06-17 NOTE — TELEPHONE ENCOUNTER
Telephone Encounter by Evie Freitas at 3/26/2021  9:59 AM     Author: Evie Freitas Service: -- Author Type: --    Filed: 3/26/2021 10:00 AM Encounter Date: 3/26/2021 Status: Signed    : Evie Freitas       PRIOR AUTHORIZATION DENIED    Denial Rational: Insurance will only cover up to 90 tablets per month            Appeal Information: If provider would like to appeal please provide a letter of medical necessity and route back to the PA team.

## 2021-06-17 NOTE — TELEPHONE ENCOUNTER
Refill request: oxycodone 20 mg  Last ov with BE: 5/5/21  Follow up in 8 weeks  Patient will schedule follow up once she has her upcoming work schedule    UDT = 11/13/2020   CSA = due     Requested Prescriptions     Pending Prescriptions Disp Refills     Oxycodone HCl 20 mg Tab 84 each 0     Sig: Take 20 mg by mouth every 4 (four) hours for 14 days.     Please mail out a copy of the CSA for patient to complete and return to clinic.

## 2021-06-17 NOTE — TELEPHONE ENCOUNTER
Telephone Encounter by Annamaria Ayon at 1/22/2021  3:33 PM     Author: Annamaria Ayon Service: -- Author Type: Patient Access    Filed: 1/22/2021  4:19 PM Encounter Date: 1/14/2021 Status: Signed    : Annamaria Ayon (Patient Access)       Prior Authorization Not Needed     Insurance details: Response received from patient's pharmacy benefits states that [employer] would handle the Initial Benefit Review- Calling [facility] at the number listen to gather more information.    Pharmacy details: [facility] - [place], [place] - [address]

## 2021-06-17 NOTE — TELEPHONE ENCOUNTER
Telephone Encounter by Tonja Montgomery RN at 5/28/2020  2:26 PM     Author: Tonja Montgomery RN Service: -- Author Type: Registered Nurse    Filed: 5/28/2020  3:18 PM Encounter Date: 5/28/2020 Status: Signed    : Tonja Montgomery RN (Registered Nurse)       Medication being requested: Sarah MA snipped in:    Pt called to ask about her script. Advised her that Dr Berry sent a script on 05/26 and she should call her pharmacy.          Statement Selected

## 2021-06-17 NOTE — TELEPHONE ENCOUNTER
Telephone Encounter by Anita Allred RN at 7/8/2020  1:07 PM     Author: Anita Allred RN Service: -- Author Type: Registered Nurse    Filed: 7/8/2020  1:08 PM Encounter Date: 7/8/2020 Status: Signed    : Anita Allred RN (Registered Nurse)       Medication being requested: Magnesium-Oxide, Flexeril and Dilaudid  Last visit date: 5/26/20.  Provider: BE  Next visit date: 7/27/20.  Provider: BE  Expected follow up: 8 weeks  MTM visit ([facility] Center) date: No  UDT date: 10/2019  Agreement date: 10/2019   snipped in:    Pertinent between visit information about requested medication (telephone, mychart, prior authorization, concerns, comments): No  Script being sent to provider by nurse- dates and quantity:   Requested Prescriptions     Pending Prescriptions Disp Refills   ? HYDROmorphone (DILAUDID) 4 MG tablet 160 tablet 0     Sig: One to two tabs every three hours as needed   ? cyclobenzaprine (FLEXERIL) 10 MG tablet 90 tablet 0     Sig: Take 1 tablet (10 mg total) by mouth 2 (two) times a day as needed for muscle spasms.   ? magnesium oxide (MAG-OX) 400 mg (241.3 mg magnesium) tablet 90 tablet 0     Sig: Take 1 tablet (400 mg total) by mouth 3 (three) times a day.     Pharmacy cued: [facility]  Standing orders for withdrawal protocol implemented: NA

## 2021-06-17 NOTE — TELEPHONE ENCOUNTER
Medication being requested: Mag-Ox  Last visit date: 3/9/21 Provider: CAS[facility]  Next visit date: 5/5/21 Provider: CASI  Expected follow up: 8-12 weeks  MTM visit (Pain Center) date: No  Pertinent between visit information about requested medication (telephone, mychart, prior authorization, concerns): No  Last date prescribed: 3/9/21  Provider responsible: BE  Spoke with patient: darren  Script being sent to provider - dates and quantity:   4/26/21-5/26/21  Requested Prescriptions     Pending Prescriptions Disp Refills     magnesium oxide (MAG-OX) 400 mg (241.3 mg magnesium) tablet [Pharmacy Med Name: Magnesium Oxide Oral Tablet 400 MG] 90 tablet 0     Sig: Take 1 tablet (400 mg total) by mouth 3 (three) times a day.     Refused Prescriptions Disp Refills     magnesium oxide (MAG-OX) 400 mg (241.3 mg magnesium) tablet [Pharmacy Med Name: Magnesium Oxide Oral Tablet 400 MG] 90 tablet 0     Sig: Take 1 tablet by mouth 3 (three) times a day.     Pharmacy cued: [facility]

## 2021-06-17 NOTE — PATIENT INSTRUCTIONS - HE
Plan:   Increase ketamine to 120 mg lozenges, 1/2 lozenge five times a day and one at bedtime.    Resume topical cream increase the ketamine to 10%, lidocaine 10%.    Continue with the oxycodone 20 mg every 4 hours.    You may look into the frequency specific microcurrent and acupuncture when you at the time.    Follow-up with Dr. Berry in 8 weeks

## 2021-06-17 NOTE — TELEPHONE ENCOUNTER
Pt cannot schedule until next week when she get her new work schedule.  Asking if RX can be sent and she will call as soon as she has her new schedule next week.

## 2021-06-17 NOTE — PROGRESS NOTES
Tisha Arias is a 60 y.o. female who is being evaluated via a billable video visit.       How would you like to obtain your AVS? MyChart.  If dropped from the video visit, the video invitation should be resent by: Text to cell phone: [phone]  Will anyone else be joining your video visit? No  Pain score: 9  Constant or intermittent: constant  What does your pain feel like: throbbing,deep ache, dull, crushing, movement intensifies with pain and muscle spasms  Does the pain interfere with:   Work: yes  Walking/distance: yes  Sleep: yes  Daily activities: yes  Relationships/social life: yes  Mood: yes  F= 8     Maine Ryan LPN

## 2021-06-17 NOTE — TELEPHONE ENCOUNTER
Telephone Encounter by Dhara Sims RN at 6/19/2020 12:56 PM     Author: Dhara Sims RN Service: -- Author Type: Registered Nurse    Filed: 6/19/2020  1:02 PM Encounter Date: 6/19/2020 Status: Signed    : Dhara Sims RN (Registered Nurse)       Medication being requested: dilaudid 4 mg  Last visit date: 5/26  Provider: BE  Next visit date: 7/27 Provider: BE  Expected follow up: 8 weeks  MTM visit (Pain Center) date: no  UDT/CSA 10/2019   snipped in:    Pertinent between visit information about requested medication (telephone, mychart, prior authorization, concerns, comments): NA  Script being sent to provider by nurse- dates and quantity:   Requested Prescriptions     Pending Prescriptions Disp Refills   ? HYDROmorphone (DILAUDID) 4 MG tablet 160 tablet 0     Sig: One to two tabs every three hours as needed     Pharmacy cued: [facility]  Standing orders for withdrawal protocol implemented: NA

## 2021-06-17 NOTE — TELEPHONE ENCOUNTER
"Telephone Encounter by Anita Allred RN at 3/31/2020  4:14 PM     Author: Anita Allred RN Service: -- Author Type: Registered Nurse    Filed: 3/31/2020  4:16 PM Encounter Date: 3/31/2020 Status: Signed    : Anita Allred RN (Registered Nurse)       Medication being requested: Magnesium and Oxycodone  Last visit date: 2/3/20.  Provider: BE  Next visit date: 4/10/20.  Provider: BE  Expected follow up: 8 weeks  MTM visit (Pain Center) date: No  UDT date: 10/2019  Agreement date: 10/2019   snipped in:    Pertinent between visit information about requested medication (telephone, mychart, prior authorization, concerns, comments):   Patient left a message stating \"pain is out of control, sleeps no longer than 1-2 hours a night\"  Script being sent to provider by nurse- dates and quantity:   Requested Prescriptions     Pending Prescriptions Disp Refills   ? oxyCODONE (ROXICODONE) 10 mg immediate release tablet 84 tablet 0     Sig: Take 1 tablet (10 mg total) by mouth every 4 (four) hours as needed for pain.   ? magnesium oxide (MAG-OX) 400 mg (241.3 mg magnesium) tablet 90 tablet 0     Sig: Take 1 tablet (400 mg total) by mouth 3 (three) times a day.     [facility] cued: [facility]  Standing orders for withdrawal protocol implemented: NA         "

## 2021-06-17 NOTE — TELEPHONE ENCOUNTER
Telephone Encounter by Dhara Sims RN at 6/10/2020 10:05 AM     Author: Dhara Sims RN Service: -- Author Type: Registered Nurse    Filed: 6/10/2020 10:11 AM Encounter Date: 6/2/2020 Status: Signed    : Dhara Sims RN (Registered Nurse)       Patient calls, request for another refill of dilaudid, she is reporting that she was not aware that she was to reduce the dose of dilaudid with this new RX and apparently was still taking as previously prescribed despite the directions for use on the prescription bottle.  Please review and advise  Per  last fill:

## 2021-06-17 NOTE — TELEPHONE ENCOUNTER
Telephone Encounter by Evie Freitas at 11/16/2020 11:16 AM     Author: Evie Freitas Service: -- Author Type: --    Filed: 11/16/2020 11:17 AM Encounter Date: 11/4/2020 Status: Signed    : Evie Freitas       Pharmacy provided information below:

## 2021-06-17 NOTE — TELEPHONE ENCOUNTER
Telephone Encounter by Dhara Sims RN at 6/3/2020  1:31 PM     Author: Dhara Sims RN Service: -- Author Type: Registered Nurse    Filed: 6/3/2020  1:46 PM Encounter Date: 2020 Status: Signed    : Dhara Sims RN (Registered Nurse)       Patient calling for another refill of dilaudid.  This had been ordered for as a trial starting .  As of last office,  recommendation to continue hydromorphone:  Continue the hydromorphone 4 mg tablets every 3 hours as needed, with flareups may use 2 tablets at a time.  Recommended to follow up in 8 weeks  Rescheduled for:  with BE  Requested Prescriptions     Pending Prescriptions Disp Refills   ? HYDROmorphone (DILAUDID) 4 MG tablet 80 tablet 0     Sig: Take 1 tablet (4 mg total) by mouth every 3 (three) hours as needed for pain.     Signed Prescriptions Disp Refills   ? ketamine HCl (KETAMINE, BULK,) 100 % Powd 90 Bottle 2     Si mg lozenge one-half lozenge six times a day as needed     Authorizing Provider: ADRI DIXON

## 2021-06-17 NOTE — TELEPHONE ENCOUNTER
Telephone Encounter by Annamaria Ayon at 6/11/2020 12:44 PM     Author: Annamaria Ayon Service: -- Author Type: --    Filed: 6/11/2020 12:55 PM Encounter Date: 6/11/2020 Status: Signed    : Annamaria Ayon       PRIOR AUTHORIZATION NOT NEEDED    80/10 was billed and showed a paid claim per the insurance on June 4th. I did call [facility] again and speak to another PA Rep (Marianela ) whom confirmed that the DUR Reject does need to be resolved at the pharmacy level and once done, it will pay through. I did call the [facility] and speak with Zachariah Lim and she did mention that she did leave a voicemail.  It is possible that since it is a CII, she just wants confirmation that the provider is aware that the rejection is do to the dose/amount of tablets per day before they bill the insurance.  I did leave my name and direct number if she has further questions.

## 2021-06-20 NOTE — LETTER
Letter by Pauline Smith MD at      Author: Pauline Smith MD Service: -- Author Type: --    Filed:  Encounter Date: 2/4/2020 Status: (Other)         Tisha Arias  [address] [place] [address]    February 4, 2020    Dear Ms. Arias,    Welcome to [facility]! Your appointment information is below.   Please bring the following to your appointment:    Insurance Card, so we may scan it for our records    Drivers license or valid ID, so we may scan it for our records    Co-pay (as applicable per your insurance plan)    A current list of your medications including over the counter products such as vitamins and supplements    Your medical records including copies of X-Ray films if you are transferring your care from another clinic.  If you do not have your records, please fill out the release of information form and we will request those records.     Provider: Pauline Smith MD  Appointment Date: March 9, 2020  Arrival Time: 3:00pm for PFT test and 4:00pm for Dr. Smith    Location: [address]        [facility], [address]    **Please allow adequate time for your commute and parking. If you are more than 10 minutes late, you may be asked to reschedule.     If you need to cancel or reschedule your appointment, please notify us at least 24 hours prior to your appointment time so we are able to make this time available for another patient.    Thank you for choosing the [facility] for your health care needs. If you have any questions, please do not hesitate to contact us at any time at   [phone]. We look forward to caring for you.     Sincerely,     [facility] [place] staff

## 2021-06-23 ENCOUNTER — HOSPITAL ENCOUNTER (OUTPATIENT)
Dept: PALLIATIVE MEDICINE | Facility: OTHER | Age: 61
Discharge: HOME | End: 2021-06-23
Attending: ANESTHESIOLOGY
Payer: COMMERCIAL

## 2021-06-23 DIAGNOSIS — G89.29 OTHER CHRONIC PAIN: ICD-10-CM

## 2021-06-23 DIAGNOSIS — G89.4 CHRONIC PAIN DISORDER: ICD-10-CM

## 2021-06-25 NOTE — TELEPHONE ENCOUNTER
Refill request: oxycodone 20 mg  Last ov with BE: 5/5/21  Follow up in 8 weeks  UDT = 11/13/2020   CSA = due   Next ov with BE: 6/23/21    Per : last sold on 6/2/21, 14 days    Requested Prescriptions     Pending Prescriptions Disp Refills     Oxycodone HCl 20 mg Tab 43 each 0     Sig: Take 20 mg by mouth every 4 (four) hours for 7 days.     Cub

## 2021-06-25 NOTE — TELEPHONE ENCOUNTER
Refill request: oxycodone, magnesium(refills at the pharmacy) and flexeril(refills at the pharmacy)  Last ov with BE: 5/5/21  Follow up in 8 weeks  Next ov with BE: 6/23/21  UDT = 11/13/2020   CSA = due     Requested Prescriptions     Pending Prescriptions Disp Refills     Oxycodone HCl 20 mg Tab 84 each 0     Sig: Take 20 mg by mouth every 4 (four) hours for 14 days.     Signed Prescriptions Disp Refills     Oxycodone HCl 20 mg Tab 84 each 0     Sig: Take 20 mg by mouth every 4 (four) hours for 14 days.     Authorizing Provider: ADRI DIXON

## 2021-06-26 NOTE — PATIENT INSTRUCTIONS - HE
PLAN:  Continue the oxycodone 20 mg every 4 hours.      Begin Namenda to help augment the oxycodone to see if doses last longer and you may sleep better.  10 mg tablets, 1/2 tablet at bedtime for 1 week, one half twice a day for 1 week, then 1 capsule twice a day    Discussed option of using oxymorphone to replace some of the oxycodone, to see if it may last longer.    You will continue with the ketamine lozenges 120 mg lozenge, 1/2 lozenge 5 times a day and ketamine cream from the [facility] pharmacy.    Discussed frequency specific microcurrent to help with nerve pain and no pain.  May contact these providers to see if they take your insurance: [facility] [phone], and [facility] [phone]    Follow-up with Dr. Berry in 8 weeks

## 2021-06-29 ENCOUNTER — COMMUNICATION - HEALTHEAST (OUTPATIENT)
Dept: PULMONOLOGY | Facility: OTHER | Age: 61
End: 2021-06-29

## 2021-06-29 NOTE — PROGRESS NOTES
"Progress Notes by Maine Ryan LPN at 5/26/2020  8:40 AM     Author: Maine Ryan LPN Service: -- Author Type: Licensed Nurse    Filed: 5/29/2020  1:22 PM Date of Service: 5/26/2020  8:40 AM Status: Signed    : Maine Ryan LPN (Licensed Nurse)       Tisha Arias is a 59 y.o. female who is being evaluated via a billable video visit.      The patient has been notified of following:     \"This video visit will be conducted via a call between you and your physician/provider. We have found that certain health care needs can be provided without the need for an in-person physical exam.  This service lets us provide the care you need with a video conversation.  If a prescription is necessary we can send it directly to your pharmacy.  If lab work is needed we can place an order for that and you can then stop by our lab to have the test done at a later time.    Video visits are billed at different rates depending on your insurance coverage. Please reach out to your insurance provider with any questions.    If during the course of the call the physician/provider feels a video visit is not appropriate, you will not be charged for this service.\"    Patient has given verbal consent to a Video visit? Yes    Patient would like to receive their AVS by AVS Preference: Mail a copy.    Patient would like the video invitation sent by: Text to cell phone: [phone]    Will anyone else be joining your video visit? No    Patient is here for a follow up appointment with Dr. Berry. Patient has bilateral hips and legs, low back and rib area pain, describes the pain as constant, deep bone pain,rates the pain as 9/10. Patient needs refills for hydromorphone, Flexeril and Ketamine. CSA, BOB, NDI, UDT are deferred due to COVID 19. Functionality is 8. Patient states her pain interferes with her sleep, walking, work, ADL's, social/relationships and sexual health.         Maine Ryan LPN       "

## 2021-07-03 NOTE — ADDENDUM NOTE
Addendum Note by Adri Dixon MD at 10/28/2020 11:13 AM     Author: Adri Dixon MD Service: -- Author Type: Physician    Filed: 10/28/2020 11:13 AM Encounter Date: 10/26/2020 Status: Signed    : Adri Dixon MD (Physician)    Addended by: ADRI DIXON on: 10/28/2020 11:13 AM        Modules accepted: Orders

## 2021-07-03 NOTE — ADDENDUM NOTE
Addendum Note by Marta Correa at 11/3/2020  2:00 PM     Author: Marta Correa Service: -- Author Type: --    Filed: 11/5/2020  7:11 AM Date of Service: 11/3/2020  2:00 PM Status: Signed    : Marta Correa    Encounter addended by: Marta Correa on: 11/5/2020  7:11 AM      Actions taken: Charge Capture section accepted

## 2021-07-03 NOTE — ADDENDUM NOTE
Addendum Note by Adri Dixon MD at 12/11/2020  6:20 PM     Author: Adri Dixon MD Service: -- Author Type: Physician    Filed: 12/11/2020  6:20 PM Encounter Date: 12/7/2020 Status: Signed    : Adri Dixon MD (Physician)    Addended by: DARI DIXON on: 12/11/2020 06:20 PM        Modules accepted: Orders

## 2021-07-03 NOTE — ADDENDUM NOTE
Addendum Note by Maine Ryan LPN at 9/8/2020  8:00 AM     Author: Maine Ryan LPN Service: -- Author Type: Licensed Nurse    Filed: 9/8/2020  8:27 AM Date of Service: 9/8/2020  8:00 AM Status: Signed    : Maine Ryan LPN (Licensed Nurse)    Encounter addended by: Maine Ryan LPN on: 9/8/2020  8:27 AM      Actions taken: Chief Complaint modified

## 2021-07-03 NOTE — ADDENDUM NOTE
Addendum Note by Pauline Smith MD at 7/9/2020 11:30 AM     Author: Pauline Smith MD Service: -- Author Type: Physician    Filed: 7/9/2020 12:56 PM Encounter Date: 7/9/2020 Status: Signed    : Pauline Smith MD (Physician)    Addended by: PALUINE SMITH on: 7/9/2020 12:56 PM        Modules accepted: Orders

## 2021-07-03 NOTE — ADDENDUM NOTE
Addendum Note by Adri Dixon MD at 1/6/2020  4:18 PM     Author: Adri Dixon MD Service: -- Author Type: Physician    Filed: 1/6/2020  4:18 PM Encounter Date: 1/6/2020 Status: Signed    : Adri Dixon MD (Physician)    Addended by: ADRI DIXON on: 1/6/2020 04:18 PM        Modules accepted: Orders

## 2021-07-03 NOTE — ADDENDUM NOTE
Addendum Note by Marta Correa at 7/27/2020  8:00 AM     Author: Marta Correa Service: -- Author Type: --    Filed: 7/29/2020  9:38 AM Date of Service: 7/27/2020  8:00 AM Status: Signed    : Marta Correa    Encounter addended by: Marta Correa on: 7/29/2020  9:38 AM      Actions taken: Charge Capture section accepted

## 2021-07-03 NOTE — ADDENDUM NOTE
Addendum Note by Marta Correa at 5/26/2020  8:40 AM     Author: Marta Correa Service: -- Author Type: --    Filed: 6/1/2020  1:34 PM Date of Service: 5/26/2020  8:40 AM Status: Signed    : Marta Correa    Encounter addended by: Marta Correa on: 6/1/2020  1:34 PM      Actions taken: Charge Capture section accepted

## 2021-07-03 NOTE — ADDENDUM NOTE
Addendum Note by Dhara Platt RN at 10/28/2020  1:41 PM     Author: Dhara Platt RN Service: -- Author Type: Registered Nurse    Filed: 10/28/2020  1:41 PM Encounter Date: 10/26/2020 Status: Signed    : Dhara Platt RN (Registered Nurse)    Addended by: DHARA PLATT on: 10/28/2020 01:41 PM        Modules accepted: Orders

## 2021-07-03 NOTE — ADDENDUM NOTE
Addendum Note by Dhara Platt RN at 11/27/2020  2:34 PM     Author: Dhara Platt RN Service: -- Author Type: Registered Nurse    Filed: 11/27/2020  2:34 PM Encounter Date: 11/23/2020 Status: Signed    : Dhara Platt RN (Registered Nurse)    Addended by: DHARA PLATT on: 11/27/2020 02:34 PM        Modules accepted: Orders

## 2021-07-03 NOTE — ADDENDUM NOTE
Addendum Note by Adri Dixon MD at 6/10/2020  4:41 PM     Author: Adri Dixon MD Service: -- Author Type: Physician    Filed: 6/10/2020  4:41 PM Encounter Date: 6/2/2020 Status: Signed    : Adri Dixon MD (Physician)    Addended by: ADRI DIXON on: 6/10/2020 04:41 PM        Modules accepted: Orders

## 2021-07-04 NOTE — ADDENDUM NOTE
Addendum Note by Dhara Platt RN at 6/1/2021 11:33 AM     Author: Dhara Platt RN Service: -- Author Type: Registered Nurse    Filed: 6/1/2021 11:33 AM Encounter Date: 5/17/2021 Status: Signed    : Dhara Platt RN (Registered Nurse)    Addended by: DHARA PLATT on: 6/1/2021 11:33 AM        Modules accepted: Orders

## 2021-07-04 NOTE — ADDENDUM NOTE
Addendum Note by Marta Correa at 5/5/2021  1:20 PM     Author: Marta Correa Service: -- Author Type: --    Filed: 5/7/2021  9:59 AM Date of Service: 5/5/2021  1:20 PM Status: Signed    : Marta Correa    Encounter addended by: Marta Correa on: 5/7/2021  9:59 AM      Actions taken: Charge Capture section accepted

## 2021-07-04 NOTE — ADDENDUM NOTE
Addendum Note by Dhara Platt RN at 3/2/2021  1:19 PM     Author: Dhara Platt RN Service: -- Author Type: Registered Nurse    Filed: 3/2/2021  1:19 PM Encounter Date: 3/1/2021 Status: Signed    : Dhara Platt RN (Registered Nurse)    Addended by: DHARA PLATT on: 3/2/2021 01:19 PM        Modules accepted: Orders

## 2021-07-04 NOTE — ADDENDUM NOTE
Addendum Note by Marta Correa at 3/9/2021  1:40 PM     Author: Marta Correa Service: -- Author Type: --    Filed: 3/15/2021  1:05 PM Date of Service: 3/9/2021  1:40 PM Status: Signed    : aMrta Correa    Encounter addended by: Marta Correa on: 3/15/2021  1:05 PM      Actions taken: Charge Capture section accepted

## 2021-07-05 ENCOUNTER — COMMUNICATION - HEALTHEAST (OUTPATIENT)
Dept: PALLIATIVE MEDICINE | Facility: OTHER | Age: 61
End: 2021-07-05

## 2021-07-05 DIAGNOSIS — G89.29 OTHER CHRONIC PAIN: ICD-10-CM

## 2021-07-05 NOTE — TELEPHONE ENCOUNTER
Telephone Encounter by Tonja Montgomery RN at 7/5/2021  4:48 PM     Author: Tonja Montgomery RN Service: -- Author Type: Registered Nurse    Filed: 7/5/2021  4:49 PM Encounter Date: 7/5/2021 Status: Signed    : Tonja Montgomery RN (Registered Nurse)       Refill Oxycodone to  7/8

## 2021-07-06 VITALS — HEIGHT: 65 IN | BODY MASS INDEX: 36.11 KG/M2

## 2021-07-06 NOTE — TELEPHONE ENCOUNTER
Telephone Encounter by Anita Allred RN at 7/6/2021  1:47 PM     Author: Anita Allred RN Service: -- Author Type: Registered Nurse    Filed: 7/6/2021  1:50 PM Encounter Date: 7/5/2021 Status: Signed    : Anita Allred RN (Registered Nurse)       Medication being requested: Oxycodone  Last visit date: 6/23/21.  Provider: BE  Next visit date: 8/11/21.  Provider: BE  Expected follow up: 8 weeks  MTM visit ([facility]) date: No  UDT date: 11/2020  Agreement date: None on file   (Last fill date; name; strength; provider; MME; quantity):  06/23/2021 Oxycodone Hcl 20 Mg Tablet Qty: 84 for 14 days Br [facility]iel  Pertinent between visit information about requested medication (telephone, mychart, prior authorization, concerns, comments): No  Script being sent to provider by nurse- dates and quantity:   7/7/21-7/21/21  Requested Prescriptions     Pending Prescriptions Disp Refills   ? Oxycodone HCl 20 mg Tab 84 each 0     Sig: Take 20 mg by mouth every 4 (four) hours for 14 days.     Pharmacy cued: [facility]  Standing orders for withdrawal protocol implemented: ROBB

## 2021-07-06 NOTE — TELEPHONE ENCOUNTER
Telephone Encounter by Carlene Reddy CNP at 7/6/2021  2:16 PM     Author: Carlene Reddy CNP Service: -- Author Type: Nurse Practitioner    Filed: 7/6/2021  2:18 PM Encounter Date: 7/5/2021 Status: Signed    : Carlene Reddy CNP (Nurse Practitioner)       Completed Chronic pain For use 7/7/2021-7/21/21 - covering provider  Requested Prescriptions     Signed Prescriptions Disp Refills   ? Oxycodone HCl 20 mg Tab 84 each 0     Sig: Take 20 mg by mouth every 4 (four) hours for 14 days.     Authorizing Provider: CARLENE REDDY       Please mail a CSA to the pt for completion and return please be certain pt is aware this will be coming and needs to be returned.     Thank you

## 2021-07-07 NOTE — TELEPHONE ENCOUNTER
Telephone Encounter by Vanessa Perez at 7/7/2021 10:17 AM     Author: Vanessa Perez Service: -- Author Type: Patient Access    Filed: 7/7/2021 10:17 AM Encounter Date: 7/5/2021 Status: Signed    : Vanessa Perez (Patient Access)       Pt is aware CSA's are being mailed out and to return completed forms in envelop provided

## 2021-07-07 NOTE — ADDENDUM NOTE
Addendum Note by Marta Correa at 6/23/2021  2:20 PM     Author: Marta Correa Service: -- Author Type: --    Filed: 6/28/2021  9:25 AM Date of Service: 6/23/2021  2:20 PM Status: Signed    : Marta Correa    Encounter addended by: Marta Correa on: 6/28/2021  9:25 AM      Actions taken: Charge Capture section accepted

## 2021-07-07 NOTE — LETTER
Letter by Pauline Smith MD at      Author: Pauline Smith MD Service: -- Author Type: --    Filed:  Encounter Date: 2021 Status: (Other)         Tisha Arias  [address] [place] [address]      2021      MRN: [id number]  : 1960      Dear MsJonatan Frazierlizabethjohn,    Thank you for choosing [facility] for your clinic needs. It is our privilege to help our patients achieve and maintain the best possible lung health through regular clinic visits.     We have attempted to contact you to schedule an appointment with Pauline Smith MD, to help ensure you are in the best health possible, regular follow-up visits and scheduled/ordered tests with your pulmonary team is essential.      Please call our Patient Scheduling Line at [phone] to schedule your appointment.     We look forward to providing your care. As always, we are available at the number above for any questions or concerns you may have.    Sincerely,    [facility]   Pauline Smith MD

## 2021-07-07 NOTE — PROGRESS NOTES
Assessment/Plan:     Problem List Items Addressed This Visit     Other chronic pain    Relevant Medications    cyclobenzaprine (FLEXERIL) 10 MG tablet    Oxycodone HCl 20 mg Tab    Chronic pain disorder - Primary    Relevant Medications    memantine (NAMENDA) 10 MG tablet            No follow-ups on file.    Patient Instructions   PLAN:  Continue the oxycodone 20 mg every 4 hours.      Begin Namenda to help augment the oxycodone to see if doses last longer and you may sleep better.  10 mg tablets, 1/2 tablet at bedtime for 1 week, one half twice a day for 1 week, then 1 capsule twice a day    Discussed option of using oxymorphone to replace some of the oxycodone, to see if it may last longer.    You will continue with the ketamine lozenges 120 mg lozenge, 1/2 lozenge 5 times a day and ketamine cream from the [facility] pharmacy.    Discussed frequency specific microcurrent to help with nerve pain and no pain.  May contact these providers to see if they take your insurance: [facility] [phone], and [facility] [phone]    Follow-up with Dr. Berry in 8 weeks        Subjective:       60 y.o. female Tisha Arias is a 60 y.o. female who is being evaluated via a billable video visit.      How would you like to obtain your AVS?   If dropped from the video visit, the video invitation should be resent by:   Will anyone else be joining your video visit?       Video Start Time:         Subjective   Tisha Arias is 60 y.o. 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    Reviews she was laid off 2 weeks ago with decreased needs for Covid testing is looking for other jobs.    She is having difficulty getting sleep, medications only last 2 hours and she is up through the night.    She cannot sustain a long activities.    Discussed oxymorphone in the past.  Had not done well with OxyContin.    Describes she used to get  benzodiazepines which would help boost the opioids.  Reviewed concerns about opioids and benzodiazepines now.  Has not been on augmenting strategies such as amantadine or Namenda.    She continues to use ketamine lozenges, 1 to 20 mg lozenge, one half every 4 hours which does seem to help somewhat.    She had been doing frequency specific microcurrent and acupuncture.  Described to seem to flareup the pain initially.      she has had trouble obtaining the ketamine cream.      Current Outpatient Medications:      acetylglucosamine, bulk, Powd, Use 600 mg As Directed 3 (three) times a day., Disp: 90 g, Rfl: 5     albuterol (PROAIR HFA;PROVENTIL HFA;VENTOLIN HFA) 90 mcg/actuation inhaler, Inhale 2 puffs every 6 (six) hours as needed for wheezing., Disp: 1 Inhaler, Rfl: 4     calcitrioL (ROCALTROL) 0.25 MCG capsule, take 2 capsules (0.5 mcg) by mouth in the AM and 1 capsule (0.25 mcg) in the PM. Virtual/video visit requested. Call  to ariel, Disp: , Rfl:      calcium citrate-vitamin D3 (CITRACAL + D) 315 mg- 250 unit per tablet, Take 2 tablets by mouth 3 (three) times a day with meals., Disp: , Rfl:      celecoxib (CELEBREX) 200 MG capsule, Take 200 mg by mouth 2 (two) times a day., Disp: , Rfl:      cetirizine (ZYRTEC) 10 MG tablet, Take 10 mg by mouth daily as needed., Disp: , Rfl:      cyclobenzaprine (FLEXERIL) 10 MG tablet, Take 1 tablet (10 mg total) by mouth 3 (three) times a day as needed for muscle spasms., Disp: 90 tablet, Rfl: 5     diclofenac sodium (VOLTAREN) 1 % Gel, Apply topically 4 (four) times a day., Disp: , Rfl:      fluticasone propionate (FLONASE) 50 mcg/actuation nasal spray, Apply 1 spray into each nostril daily as needed. , Disp: , Rfl:      furosemide (LASIX) 20 MG tablet, Take 20 mg by mouth 2 (two) times a day., Disp: , Rfl:      ketamine HCl (KETAMINE, BULK,) 100 % Powd, 120 mg lozenge one-half lozenge 5 times a day, and one lozenge bedtime, Disp: 90 Bottle, Rfl: 2     ketamine  HCl (KETAMINE, BULK,) 100 % Powd, Ketamine 10% and lidocaine 10% topical cream, apply to feet three times a day, 30 mg tube, Disp: 1 Bottle, Rfl: 3     levothyroxine (SYNTHROID, LEVOTHROID) 112 MCG tablet, Take 112 mcg by mouth 2 (two) times a day., Disp: , Rfl:      magnesium oxide (MAG-OX) 400 mg (241.3 mg magnesium) tablet, Take 1 tablet (400 mg total) by mouth 3 (three) times a day., Disp: 90 tablet, Rfl: 5     mometasone (ASMANEX HFA) 200 mcg/actuation HFAA, Inhale 400 mcg 2 (two) times a day., Disp: 1 Inhaler, Rfl: 11     montelukast (SINGULAIR) 10 mg tablet, Take 20 mg by mouth 2 (two) times a day., Disp: , Rfl:      naloxone (NARCAN) 4 mg/actuation nasal spray, 1 spray (4 mg dose) into one nostril for opioid reversal. Call 911. May repeat if no response in 3 minutes., Disp: 1 Box, Rfl: 0     NON FORMULARY, Take 2 capsules by mouth 3 (three) times a day as needed. PEA supplement, Disp: , Rfl:      ondansetron (ZOFRAN-ODT) 8 MG disintegrating tablet, Take 1 tablet by mouth every 8 (eight) hours as needed., Disp: , Rfl:      Oxycodone HCl 20 mg Tab, Take 20 mg by mouth every 4 (four) hours for 14 days., Disp: 84 each, Rfl: 0     potassium chloride (K-DUR,KLOR-CON) 20 MEQ tablet, Take 20 mEq by mouth 2 (two) times a day., Disp: , Rfl:      SUMAtriptan (IMITREX) 50 MG tablet, Take 50 mg by mouth every 2 (two) hours as needed. , Disp: , Rfl:      tobramycin (TOBREX) 0.3 % ophthalmic solution, ADMINISTER 1 2 DROPS TO THE RIGHT EYE EVERY 4 (FOUR) HOURS FOR 7 DAYS., Disp: , Rfl:      VITAMIN D3 2,000 unit capsule, Take 5,000 Units by mouth daily. , Disp: , Rfl: 1     cromolyn (NASALCHROM) 5.2 mg/spray (4 %) nasal spray, 1 spray into each nostril 4 (four) times a day., Disp: 26 mL, Rfl: 12     magnesium oxide 400 mg magnesium Tab, Take 1 tablet by mouth 3 (three) times a day., Disp: 90 tablet, Rfl: 0     memantine (NAMENDA) 10 MG tablet, One-half tab bedtime for one week, one-half twice a day one week, one twice a  "day, Disp: 60 tablet, Rfl: 2     tobramycin-dexamethasone (TOBRADEX) ophthalmic solution, INSTILL ONE DROP INTO BOTH EYES THREE TIMES DAILY, Disp: , Rfl:   By video alert, clear sensorium.  Thought process logical.  Affect is fairly full range.      Additional provider notes:     Review of Systems        Objective       Vitals:  No vitals were obtained today due to virtual visit.    Physical Exam              Video-Visit Details    Type of service:  Video Visit    Video End Time (time video stopped):   Originating Location (pt. Location): Home    Distant Location (provider location):  [facility]     Platform used for Video Visit: Doximity           Objective:     Vitals:    06/23/21 1429   Height: 5' 5\" (1.651 m)   PainSc: 10-Worst pain ever   PainLoc: Back       Total time more than 20 minutes            This note has been dictated using voice recognition software. Any grammatical or context distortions are unintentional and inherent to the software  "

## 2021-07-15 NOTE — PROGRESS NOTES
"[facility]  Hematology Oncology Progress Note      Patient: Tisha Arias MRN# [id number]   Age: 57 year old YOB: 1960           Assessment and Plans     Tisha Arias is a 57 year old female with high grade DLBCL and PMHx significant for breast cancer s/p bilateral mastectomies & BENJIE/BSO, papillary thyroid cancer s/p total thyroidectomy, chronic chest wall pain, muscle spasms, asthma and h/o Rachael en Y gastric bypass, who presents for Cycle 2 of DA-R-EPOCH.     HEME  # High grade B cell lymphoma. \"Double hit-like\". Primary is Dr. Sauceda. Patient diagnosed August 2017, when she presented with worsening chest and back pain. Found to have a lytic lesion of right 10th rib with extension. Disease localized above the diaphragm in thoracic and paravertebral soft tissue and mediastinal lymphadenopathy. 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 Staging LP and BMBx were negative for lymphoma. S/p Cycle 1 DA-R-EPOCH c/b mucositis and chronic LE peripheral edema. She presents for admission for Cycle 2 DA-R-EPOCH.  -Port-a-cath in place  -CT C/A/P 10/27/17 showing significant interval improvement in mediastinal and thoracic masses  -Will send notes to Dr. Sauceda, care coordinator and place call to patient relations regarding Elvin's frustration with care coordination and wait time until chemo started. We will accelerate her chemo schedule in attempt to compensate.      Treatment plan: Cycle 2 DA-R-EPOCH (Day 1-10/27/17).  Today is day 2. (Days accelerated to 23hours, discussed with pharmacy)  -Rituxan 750 mg (Day 0)  -Prednisone 60 mg  (Day 1-5)  -Etoposide 100 mg CIVI (Day 1-4)  -Vincristine 0.8 mg CIVI (Day 1-4)  -Cyclophosphamide 1500 mg (Day 5)  -LP with IT chemotherapy scheduled Monday 10/30 @0830. Orders entered.  Premeds: Tylenol " and benadryl prior to Rituxan, Zofran (D1-5), Emend (d5), Dex (D6-7).  -Neulasta on discharge.  -Discharge with D6-7 Dexamethasone     #Anemia. 2/2 disease and chemotherapy.  -Transfuse for Hgb <8 and plts <10K.     #Stage 1, ER/IA-positive, HER2-negative breast cancer. Follows with Dr. Crawley. Diagnosed in 2011. Oncotype recurrence score of 11 (7% recurrence risk after 5 years of tamoxifen). S/p bilateral mastectomies and BENJIE/BSO in 2012. For endocrine therapy she was on Arimidex from 4756-9788, then changed to Tamoxifen b/c of arthralgias on Arimidex. Has been on Tamoxifen since 2014 now held while being treated for lymphoma. Last dose 10/5.   -F/U with Dr. Crawley 11/27/17.     # Papillary thyroid cancer. Follows with Dr. Castro. Diagnosed in 2013. S/p total thyroidectomy and CND. Received ETOH treatment August 2014, October 2014 and December 2014. Has post-surgical hypothyroidism.  -Continue PTA Levothyroxine.  -Continue PTA calcitriol.     GI  # H/o Rachael en Y gastric bypass. Likely affecting absorption, thus patient is on multiple supplements.  -Continue Calcium and Magnesium replacements.     Pain  # Chronic chest wall pain after mastectomy.   -Continue MS Contin 30 mg morning and afternoon and 60 mg at night.  -Oxycodone 30 mg q4hrs prn.  -Celebrex 200 BID.   -Lidocaine and Ketoralac creams prn.     #Chronic muscle cramps.   -Continue KCl 20 mEq daily, Robaxin 750 mg QID, Valium 5 mg TID prn, Flexeril prn.     ID  #Anti-infectives.  -Continue PTA acyclovir and fluconazole.  -Will hold Levaquin ppx as patient is not neutropenic. To resume on discharge or when ANC <1000.     MISC  #Lymphedema. Of LE. Worsened today per patient. Wearing compression stockings.   -Continue PTA HCTZ  - increase lasix to BID  -Monitor BMP daily     #Asthma, allergies.   -Continue PTA singular, zyrtec.  -Patient states she is not taking her inhalers. Requests only albuterol prn rescue.     FEN  -IVF per chemo regimen  -PTA  electrolyte replacements, can consider sliding scale if necessary.  -RADT     Prophys  -VTE: Lovenox ppx  -PUD: PTA ranitidine  -Bowels: PTA docusate and miralax      Dispo: Pending completion of chemotherapy and resolution of any acute toxicities, likely 4-5 day stay (~10/ 30-31).      Patient was seen, care plans discussed and staffed with Dr. Meghan Sanchez MD   Hematology / Oncology Fellow  P: [phone]        Interval History     NICKY overnight. Slept well. Pt expressing frustration with regard to delay in receiving chemotherapy yesterday. We discussed this in depth and reassured that we will make all attempts to improve this in the future. Pt denies f/c/s or n/v. Complaining of increased LE swelling making her unable to get her compression stalkings back on.       ROS: A complete ROS was performed and was negative except as mentioned above      Current Facility-Administered Medications   Medication     Medication Instruction     enoxaparin (LOVENOX) injection 40 mg     polyethylene glycol (MIRALAX/GLYCOLAX) Packet 17 g     ondansetron (ZOFRAN) tablet 16 mg     [START ON 10/31/2017] fosaprepitant (EMEND) 150 mg in NaCl 0.9 % intermittent infusion     [START ON 11/1/2017] dexamethasone (DECADRON) tablet 8 mg     [START ON 10/30/2017] methotrexate (PF) 12 mg in sodium chloride (PF) 0.9% PF 6 mL Preservative Free CHEMOTHERAPY     predniSONE (DELTASONE) tablet 60 mg     Chemotherapy Infusing-Continuous Infusion     etoposide (TOPOSAR) 100 mg in NaCl 0.9 % 555 mL CHEMOTHERAPY     vinCRIStine (ONCOVIN) 0.8 mg, DOXOrubicin (ADRIAMYCIN) 20 mg in NaCl 0.9 % 1,061 mL CHEMOTHERAPY     [START ON 10/31/2017] cyclophosphamide (CYTOXAN) 1,500 mg in NaCl 0.9 % 625 mL CHEMOTHERAPY     [START ON 11/1/2017] filgrastim 15 mcg/mL (in Dextrose) (NEUPOGEN) infusion 400 mcg     prochlorperazine (COMPAZINE) tablet 10 mg     prochlorperazine (COMPAZINE) injection 10 mg     LORazepam (ATIVAN) tablet 0.5-1 mg     LORazepam  (ATIVAN) injection 0.5-1 mg     MEDICATION INSTRUCTION     methylPREDNISolone sodium succinate (solu-MEDROL) injection 125 mg     diphenhydrAMINE (BENADRYL) injection 50 mg     meperidine (DEMEROL) injection 25 mg     EPINEPHrine PF (ADRENALIN) injection 0.3 mg     albuterol (PROAIR HFA/PROVENTIL HFA/VENTOLIN HFA) Inhaler 1-2 puff     albuterol neb solution 2.5 mg     0.9% sodium chloride infusion     naloxone (NARCAN) injection 0.1-0.4 mg     acyclovir (ZOVIRAX) tablet 400 mg     allopurinol (ZYLOPRIM) tablet 300 mg     calcitRIOL (ROCALTROL) capsule 0.5 mcg     calcium carbonate (TUMS) chewable tablet 500-1,000 mg     cetirizine (zyrTEC) tablet 10 mg     cromolyn (OPTICROM) 4 % ophthalmic solution 1 drop     diazepam (VALIUM) tablet 5 mg     diclofenac (VOLTAREN) 1 % topical gel 2 g     diphenhydrAMINE (BENADRYL) capsule 50 mg     docusate sodium (COLACE) capsule 100 mg     ferrous sulfate (IRON) tablet 325 mg     fluconazole (DIFLUCAN) tablet 200 mg     hydrochlorothiazide (MICROZIDE) capsule 12.5 mg     furosemide (LASIX) tablet 20 mg     magic mouthwash suspension (diphenhydrAMINE, lidocaine, aluminum-magnesium & simethicone)     montelukast (SINGULAIR) tablet 20 mg     morphine (MS CONTIN) 12 hr tablet 30 mg     morphine (MS CONTIN) 12 hr tablet 60 mg     oxyCODONE (ROXICODONE) IR tablet 30 mg     polyethylene glycol (MIRALAX/GLYCOLAX) Packet 17 g     potassium chloride SA (K-DUR/KLOR-CON M) CR tablet 20 mEq     ranitidine (ZANTAC) tablet 75 mg     SUMAtriptan (IMITREX) tablet 50 mg     triamcinolone (KENALOG) 0.025 % ointment     albuterol (PROAIR HFA/PROVENTIL HFA/VENTOLIN HFA) Inhaler 2 puff     cyclobenzaprine (FLEXERIL) tablet 10 mg     levothyroxine (SYNTHROID/LEVOTHROID) tablet 224 mcg     [START ON 10/29/2017] levothyroxine (SYNTHROID/LEVOTHROID) tablet 112 mcg     lidocaine (XYLOCAINE) 5 % ointment     Digestzymes capsules     methocarbamol (ROBAXIN) tablet 750 mg     Vitamin D3 5000 units vegetarian  "capsules     calcitRIOL (ROCALTROL) capsule 0.5 mcg     celecoxib (celeBREX) capsule 200 mg     Muscle Mag capsules     Calcium D-Glucarate capsules           Physical Exams     /73  Pulse 83  Temp 97.2  F (36.2  C) (Oral)  Resp 18  Ht 1.651 m (5' 5\")  Wt 85.3 kg (188 lb)  SpO2 98%  BMI 31.28 kg/m2  Wt Readings from Last 3 Encounters:   10/28/17 85.3 kg (188 lb)   10/27/17 85.1 kg (187 lb 9.8 oz)   10/23/17 85.9 kg (189 lb 4.8 oz)     Gen: alert, conversational, NAD  HEENT: NC/AT, anicteric sclera. OP clear. MMM. Neck supple   CV: normal S1,S2 with RRR no m/r/g  Resp: lungs CTA bilaterally with adequate air movement to bases. No wheezes or crackles  Abd: soft NTND no masses. BS normoactive.   Ext: LEs WWP with 2+ bilateral pitting pretibial edema  Skin: no concerning lesions or rashes  Neuro: A&Ox4, no lateralizing sx. Grossly nonfocal.         Labs     !HEMATOLOGY Latest Ref Rng & Units 10/28/2017 10/27/2017   WBC 4.0 - 11.0 10e9/L 9.9 10.4   RBC 3.8 - 5.2 10e12/L 3.60 (L) 3.90   HGB 11.7 - 15.7 g/dL 9.2 (L) 10.0 (L)   HCT 35.0 - 47.0 % 31.2 (L) 33.6 (L)   MCV 78 - 100 fl 87 86   MCH 26.5 - 33.0 pg 25.6 (L) 25.6 (L)   MCHC 31.5 - 36.5 g/dL 29.5 (L) 29.8 (L)   RDW 10.0 - 15.0 % 19.7 (H) 19.3 (H)    - 450 10e9/L 234 229   PLATELET ESTIMATE      % NEUTROPHILS % 93.2 89.5   % LYMPHOCYTES % 3.1 6.1   ABSOLUTE LYMPHOCYTES 0.8 - 5.3 10e9/L 0.3 (L) 0.6 (L)   ABSOLUTE MONOCYTES 0.0 - 1.3 10e9/L 0.1 0.3   % EOSINOPHILS % 0.0 0.0   ABSOLUTE EOSINOPHILS 0.0 - 0.7 10e9/L 0.0 0.0   ABSOULTE BASOPHILS 0.0 - 0.2 10e9/L 0.0 0.1   FERRITIN 8 - 252 ng/mL     VITAMIN B12 >210 pg/mL     IRON 35 - 180 ug/dL     SED RATE 0 - 30 mm/h     ABSOLUTE NEUTROPHIL 1.6 - 8.3 10e9/L 9.3 (H) 9.3 (H)   !COMPREHENSIVE Latest Ref Rng & Units 10/28/2017 10/27/2017   SODIUM 133 - 144 mmol/L 141 140   POTASSIUM 3.4 - 5.3 mmol/L 3.6 3.8   CHLORIDE 94 - 109 mmol/L 103 102   BUN 7 - 30 mg/dL 14 20   Creatinine 0.52 - 1.04 mg/dL 0.90 0.93 "   Glucose 70 - 99 mg/dL 162 (H) 112 (H)   ANION GAP 3 - 14 mmol/L 10 6   CALCIUM 8.5 - 10.1 mg/dL 7.6 (L) 8.0 (L)   ALBUMIN 3.4 - 5.0 g/dL  3.0 (L)   PROTEIN, TOTAL 6.8 - 8.8 g/dL  5.7 (L)   AST 0 - 45 U/L  29   ALT 0 - 50 U/L  53 (H)   ALKPHOS 40 - 150 U/L  77   BILIRUBIN TOTAL 0.2 - 1.3 mg/dL  0.3           Images     Recent Results (from the past 24 hour(s))   CT Chest/Abdomen/Pelvis w Contrast    Narrative    EXAMINATION: CT CHEST/ABDOMEN/PELVIS W CONTRAST, 10/27/2017 6:36 PM    TECHNIQUE:  Helical CT images from the thoracic inlet through the  symphysis pubis were obtained  with contrast. Contrast dose: iopamidol  (ISOVUE-370) solution 115 mL    COMPARISON: PET/CT on 9/6/2017    HISTORY: evaluate for progression of disease    FINDINGS:    Chest: Right IJ Port-A-Cath tip is in the high right atrium. Bilateral  breast implants. Heart size is within normal limits. No pericardial  effusion. No significant hilar or axillary lymphadenopathy. The  previously noted hypermetabolic soft tissue focus anterolateral to the  descending thoracic aorta noted on 9/6/2017 PET/CT is smaller, now  measuring 1.0 cm short axis, previously 1.9 cm short axis. Soft tissue  mass, previously involving the right 10th rib with extension into the  neural foramen has decreased in size, measuring 2.3 cm, previously 3.4  cm. No evidence of pleural effusion or pneumothorax.    Abdomen and pelvis: Postsurgical changes of cholecystectomy with mild  intra and extrahepatic biliary dilatation. The common bile duct  measures approximately 15 mm in diameter. Otherwise the liver is  unremarkable Postsurgical changes of Rachael-en-Y gastric surgery. The  spleen, pancreas, adrenal glands. Small nonobstructing right kidney  stone. Otherwise both kidneys are unremarkable. No abnormally dilated  bowel loops. No significant free fluid in the abdomen pelvis. No free  peritoneal portal venous gas. Moderate amount of stool in the colon.  No significant  retroperitoneal or mesenteric lymphadenopathy. The  previously noted soft tissue focus of increased radiotracer uptake  noted on 9/6/2017 exam is no longer seen.    Bones and soft tissues: Vertebral hemangioma at the level of T9 and  L4, unchanged. Destructive lesion involving the medial right 10th rib  as described above.      Impression    IMPRESSION:   1. Interval response to therapy is indicated by decreased size of  mediastinal lymphadenopathy and right chest wall mass eroding the  right 10th rib.  2. Moderate amount of stool throughout the colon, nonspecific, can be  seen with constipation.  3. Tiny nonobstructing right kidney stone.    I have personally reviewed the examination and initial interpretation  and I agree with the findings.    DAVID AVALOS MD          Statement Selected

## 2021-07-19 DIAGNOSIS — G89.28 OTHER CHRONIC POSTPROCEDURAL PAIN: ICD-10-CM

## 2021-07-19 DIAGNOSIS — R07.89 CHEST WALL PAIN: ICD-10-CM

## 2021-07-19 DIAGNOSIS — G89.4 CHRONIC PAIN SYNDROME: Primary | ICD-10-CM

## 2021-07-19 RX ORDER — OXYCODONE HYDROCHLORIDE 20 MG/1
20 TABLET ORAL EVERY 4 HOURS PRN
COMMUNITY
Start: 2021-07-07 | End: 2021-07-19

## 2021-07-19 RX ORDER — OXYCODONE HYDROCHLORIDE 20 MG/1
20 TABLET ORAL EVERY 4 HOURS PRN
Qty: 84 TABLET | Refills: 0 | Status: SHIPPED | OUTPATIENT
Start: 2021-07-21 | End: 2021-08-04

## 2021-07-19 NOTE — TELEPHONE ENCOUNTER
Refill request: oxycodone 20 mg  Pending Prescriptions:                       Disp   Refills    oxyCODONE HCl (ROXICODONE) 20 MG TABS imm*84 tab*0            Sig: Take 20 mg by mouth every 4 hours as needed           (chronic pain)    Signed Prescriptions:                        Disp   Refills    oxyCODONE HCl (ROXICODONE) 20 MG TABS imme*                Sig: Take 20 mg by mouth every 4 hours as needed  Authorizing Provider: PATIENT REPORTED  Ordering User: PABLO PLATT

## 2021-07-22 NOTE — PROGRESS NOTES
Tisha Arias is a 60 y.o. female who is being evaluated via a billable video visit.       How would you like to obtain your AVS? MyChart.  If dropped from the video visit, the video invitation should be resent by: Text to cell phone: [phone]  Will anyone else be joining your video visit? No  Pain score: 10  Constant or intermittent: constant  What does your pain feel like: throbbing,deep ache, dull, crushing, movement intensifies with pain and muscle spasms  Does the pain interfere with:   Work: yes  Walking/distance: yes  Sleep: yes  Daily activities: yes  Relationships/social life: yes  Mood: yes  F= 8     Maine Ryan LPN

## 2021-07-29 ENCOUNTER — TELEPHONE (OUTPATIENT)
Dept: PALLIATIVE MEDICINE | Facility: OTHER | Age: 61
End: 2021-07-29

## 2021-07-29 NOTE — TELEPHONE ENCOUNTER
Refill request: compounded cream  Last apt with BE: 6/23/21  Follow up in 8 weeks  Follow up apt with BE: 8/11/21  Pending Prescriptions:                       Disp   Refills    COMPOUND CONTAINING CONTROLLED SUBSTANCE *600 g  0            Sig: Ketamine 6%, Lidocaine 10% in PLO    Apply small           amount to area BID PRN    Signed Prescriptions:                        Disp   Refills    oxyCODONE HCl (ROXICODONE) 20 MG TABS imme*84 tab*0        Sig: Take 20 mg by mouth every 4 hours as needed (chronic           pain)  Authorizing Provider: ADRI DIXON

## 2021-07-29 NOTE — TELEPHONE ENCOUNTER
Received call from patient requesting refill(s) of 3 medications. Oxycodone to [facility], Ketamine to [facility] and Ketamine with Lidocaine to [facility]. Please call to verify what prescriptions she would like filled specifically. She is also wondering if she can get a larger supply of her Ketamine with Lidocaine.     Last dispensed from pharmacy on     Patient's last office/virtual visit by prescribing provider on 6/23/2021  Next office/virtual appointment scheduled for 8/11/2021    Last urine drug screen date 11/13/2020  Current opioid agreement on file (completed within the last year) Yes Date of opioid agreement: 10/23/2019    E-prescribe to multiple pharmacies    Will route to nursing pool for review and preparation of prescription(s).

## 2021-08-04 ENCOUNTER — TELEPHONE (OUTPATIENT)
Dept: PALLIATIVE MEDICINE | Facility: OTHER | Age: 61
End: 2021-08-04

## 2021-08-04 DIAGNOSIS — G89.4 CHRONIC PAIN SYNDROME: ICD-10-CM

## 2021-08-04 RX ORDER — OXYCODONE HYDROCHLORIDE 20 MG/1
20 TABLET ORAL EVERY 4 HOURS PRN
Qty: 84 TABLET | Refills: 0 | Status: SHIPPED | OUTPATIENT
Start: 2021-08-04 | End: 2021-08-11

## 2021-08-04 NOTE — TELEPHONE ENCOUNTER
Medication being requested: Oxycodone  Last visit date: 6/23/21.  Provider: BE  Next visit date: 8/11/21.  Provider: CASI  Expected follow up: 8 weeks  MTM visit ([facility]) date: No  UDT date: 11/2020  Agreement date: due   (Last fill date; name; strength; provider; MME; quantity):  07/21/2021 Oxycodone Hcl 20 Mg Tablet Qty:84 for 14 days Br Massiel  Pertinent between visit information about requested medication (telephone, mychart, prior authorization, concerns, comments):   6/23/21 Plan  Continue the oxycodone 20 mg every 4 hours.  Script being sent to provider by nurse- dates and quantity:   8/4/21-8/18/21  Pending Prescriptions:                       Disp   Refills    oxyCODONE HCl (ROXICODONE) 20 MG TABS imm*84 tab*0            Sig: Take 20 mg by mouth every 4 hours as needed           (chronic pain)    Pharmacy cued: [facility]  Standing orders for withdrawal protocol implemented: ROBB

## 2021-08-04 NOTE — TELEPHONE ENCOUNTER
[facility] pharmacy in [place] called looking for an update on Oxycodone refill. Patient last picked up Rx on 7/21/2021 so is due for another refill. He states that they are expecting this refill request and waiting for this refill request to be sent to them. Their phone number is [phone]. Marta Gonzalez, ATC

## 2021-08-05 ENCOUNTER — TELEPHONE (OUTPATIENT)
Dept: PULMONOLOGY | Facility: OTHER | Age: 61
End: 2021-08-05

## 2021-08-06 DIAGNOSIS — D89.42 IDIOPATHIC MAST CELL ACTIVATION SYNDROME (H): Chronic | ICD-10-CM

## 2021-08-06 DIAGNOSIS — R07.89 CHEST WALL PAIN: Primary | ICD-10-CM

## 2021-08-06 RX ORDER — MONTELUKAST SODIUM 10 MG/1
20 TABLET ORAL 2 TIMES DAILY
Qty: 120 TABLET | Refills: 0 | OUTPATIENT
Start: 2021-08-06

## 2021-08-06 RX ORDER — KETAMINE HCL 100 %
POWDER (GRAM) MISCELLANEOUS
COMMUNITY
End: 2021-08-06

## 2021-08-06 RX ORDER — KETAMINE HCL 100 %
POWDER (GRAM) MISCELLANEOUS
Qty: 90 G | Refills: 2 | Status: SHIPPED | OUTPATIENT
Start: 2021-08-06 | End: 2021-10-14

## 2021-08-06 NOTE — TELEPHONE ENCOUNTER
I spoke with [facility] personell, they have a Ketamine cream ready for shipment for 8/11/2021 and patient is aware but is a 10 day supply and patient would like a larger amount dispensed. Needs a Ketamine katharina rx sent to [facility] pharmacy.

## 2021-08-06 NOTE — TELEPHONE ENCOUNTER
Halima from [facility] calls requesting a Ketamine refill for patient.  After review, this has been faxed back on 7/29/21.  Will have to phone in Rx to pharmacy instead.

## 2021-08-06 NOTE — TELEPHONE ENCOUNTER
Medication being requested: Ketamine 120 mg katharina  Last visit date: 2021.  Provider: CASI  Next visit date: 2021.  Provider: CASI  Expected follow up: 8 weeks  MTM visit ([facility]) date: n/a  UDT date: 2020  Agreement date: mailed 2021   (Last fill date; name; strength; provider; MME; quantity):  2021 Ketamine katharina 25 days, 10.80  Pertinent between visit information about requested medication (telephone, mychart, prior authorization, concerns, comments): medication refills  Script being sent to provider by nurse- dates and quantity: Pending Prescriptions:                       Disp   Refills    ketamine HCl POWD                         90 g   2            Si mg katharina; 1/2 katharina 5 times a day and 1           katharina at bedtime    Signed Prescriptions:                        Disp   Refills    ketamine HCl POWD                                          Si mg katharina; 1/2 katharina 5 times a day and 1 katharina           at bedtime  Authorizing Provider: PATIENT REPORTED  Ordering User: YOMI BERMAN    Pharmacy cued: [facility]  Standing orders for withdrawal protocol implemented: n/a

## 2021-08-07 DIAGNOSIS — R07.89 CHEST WALL PAIN: ICD-10-CM

## 2021-08-09 RX ORDER — CELECOXIB 200 MG/1
200 CAPSULE ORAL 2 TIMES DAILY
Qty: 60 CAPSULE | Refills: 0 | Status: SHIPPED | OUTPATIENT
Start: 2021-08-09 | End: 2021-09-02

## 2021-08-09 NOTE — TELEPHONE ENCOUNTER
Routing refill request to provider for review/approval because:  Patient needs to be seen because it has been more than 1 year since last office visit.  BP Readings from Last 3 Encounters:   11/11/20 (!) 159/84   03/09/20 128/82   01/29/20 121/67

## 2021-08-11 ENCOUNTER — VIRTUAL VISIT (OUTPATIENT)
Dept: PALLIATIVE MEDICINE | Facility: OTHER | Age: 61
End: 2021-08-11
Payer: COMMERCIAL

## 2021-08-11 ENCOUNTER — TELEPHONE (OUTPATIENT)
Dept: PALLIATIVE MEDICINE | Facility: OTHER | Age: 61
End: 2021-08-11

## 2021-08-11 DIAGNOSIS — D47.02 MAST CELL DISEASE, SYSTEMIC: Chronic | ICD-10-CM

## 2021-08-11 DIAGNOSIS — M79.18 MYOFASCIAL PAIN: ICD-10-CM

## 2021-08-11 DIAGNOSIS — R07.89 CHEST WALL PAIN: ICD-10-CM

## 2021-08-11 PROCEDURE — 999N001193 HC VIDEO/TELEPHONE VISIT; NO CHARGE: Performed by: ANESTHESIOLOGY

## 2021-08-11 PROCEDURE — 99213 OFFICE O/P EST LOW 20 MIN: CPT | Mod: TEL | Performed by: ANESTHESIOLOGY

## 2021-08-11 RX ORDER — OXYCODONE HYDROCHLORIDE 20 MG/1
TABLET ORAL
Qty: 168 TABLET | Refills: 0 | Status: SHIPPED | OUTPATIENT
Start: 2021-08-18 | End: 2021-08-11

## 2021-08-11 RX ORDER — CYCLOBENZAPRINE HCL 10 MG
10 TABLET ORAL 3 TIMES DAILY PRN
Qty: 30 TABLET | Refills: 2 | Status: SHIPPED | OUTPATIENT
Start: 2021-08-11 | End: 2021-12-09

## 2021-08-11 RX ORDER — CROMOLYN SODIUM 5.2 MG
AEROSOL, SPRAY WITH PUMP (ML) NASAL
Qty: 1 ML | Refills: 3 | Status: SHIPPED | OUTPATIENT
Start: 2021-08-11

## 2021-08-11 RX ORDER — HYDROXYZINE HYDROCHLORIDE 25 MG/1
TABLET, FILM COATED ORAL
Qty: 90 TABLET | Refills: 3 | Status: SHIPPED | OUTPATIENT
Start: 2021-08-11 | End: 2021-09-14

## 2021-08-11 RX ORDER — OXYCODONE HYDROCHLORIDE 20 MG/1
TABLET ORAL
Qty: 84 TABLET | Refills: 0 | Status: SHIPPED | OUTPATIENT
Start: 2021-08-18 | End: 2021-09-01

## 2021-08-11 RX ORDER — KETAMINE HCL 100 %
POWDER (GRAM) MISCELLANEOUS
COMMUNITY
Start: 2021-05-05 | End: 2021-10-07
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
## 2021-08-11 NOTE — PATIENT INSTRUCTIONS
PLAN:  Add hydroxyzine 25 mg 1 to 2 tablets every 6 hours for itching that may be related her mast cell activation syndrome.  May also augment the opioids.    Dr. Berry has sent other agents to help with your mast cell activation, renewing the Nasalcrom nasal spray.    Continue with the Celebrex 200 mg twice a day.    Continue with the oxycodone 20 mg immediate release every 4 hours.    Continue with the ketamine 120 mg lozenges, 1/2 lozenge 5 times a day and 1 at bedtime.    Follow-up Dr. Berry in 8 weeks

## 2021-08-11 NOTE — PROGRESS NOTES
Elvni is a 60 year old who is being evaluated via a billable telephone visit.      What phone number would you like to be contacted at? [phone]  How would you like to obtain your AVS? mail      Pain score: 8  Constant or intermittent: constant  What does your pain feel like: crushing, sharp, stabbing ain  Does the pain interfere with:  Work: yes  Walking/distance: half block  Sleep: yes  Daily activities: yes  Relationships/social life: yes  Mood: yes  F= 8
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
## 2021-08-11 NOTE — TELEPHONE ENCOUNTER
[facility] calls, questions regarding recently sent Rx for oxycodone 20 mg, #84 tabs  They also have an Rx for oxycodone 20 for a quantity of #168 tabs    Call placed to the pharmacy:  Cancelled he RX for #168 tabs  Advised to fill the RX sent today for #84 tabs

## 2021-08-27 ENCOUNTER — TELEPHONE (OUTPATIENT)
Dept: PALLIATIVE MEDICINE | Facility: OTHER | Age: 61
End: 2021-08-27

## 2021-08-27 DIAGNOSIS — M79.2 NEUROPATHIC PAIN OF CHEST: Primary | ICD-10-CM

## 2021-08-27 RX ORDER — OMEPRAZOLE 10 MG/1
20 CAPSULE, DELAYED RELEASE ORAL DAILY
Qty: 30 CAPSULE | Refills: 1 | Status: SHIPPED | OUTPATIENT
Start: 2021-08-27

## 2021-08-27 NOTE — TELEPHONE ENCOUNTER
Reason for call:  Other   Patient called regarding (reason for call): pharmacy calling regarding Rx Zantac  Additional comments: Gaurang from [facility] in [place] calling to see if provider will Rx different medication other than Zantac as it has been recalled. Please call and confirm the new medication, or send new Rx to pharmacy that fills his pain medications    Phone number to reach patient:  Other phone number:  [phone]    Best Time:  any    Can we leave a detailed message on this number?  YES    Travel screening: Not Applicable

## 2021-08-30 DIAGNOSIS — C77.0 METASTASIS TO CERVICAL LYMPH NODE (H): ICD-10-CM

## 2021-08-30 DIAGNOSIS — E89.0 POSTSURGICAL HYPOTHYROIDISM: ICD-10-CM

## 2021-08-30 DIAGNOSIS — C73 PAPILLARY CARCINOMA, FOLLICULAR VARIANT (H): ICD-10-CM

## 2021-08-31 DIAGNOSIS — M79.18 MYOFASCIAL PAIN: ICD-10-CM

## 2021-09-01 DIAGNOSIS — R07.89 CHEST WALL PAIN: ICD-10-CM

## 2021-09-01 RX ORDER — OXYCODONE HYDROCHLORIDE 20 MG/1
20 TABLET ORAL EVERY 4 HOURS
Qty: 84 TABLET | Refills: 0 | Status: SHIPPED | OUTPATIENT
Start: 2021-09-01 | End: 2021-09-14

## 2021-09-01 NOTE — TELEPHONE ENCOUNTER
Medication being requested: Oxycodone  Last visit date: 8/11/21  Next visit date: 10/6/21    UDT date: 11/2020  Agreement date: mailed 8/6/2021  Last Filled: 8/18/21 Qty: 47 for 8 days  Pharmacy cued: [facility]    Medication refill information reviewed.     Due date for Oxycodone was 8/26/21    Prescriptions prepped for review.   Pending Prescriptions:                       Disp   Refills    oxyCODONE HCl (ROXICODONE) 20 MG TABS imm*84 tab*0            Sig: Take 20 mg by mouth every 4 hours for 14 days    Will route to provider.

## 2021-09-02 RX ORDER — CELECOXIB 200 MG/1
200 CAPSULE ORAL 2 TIMES DAILY
Qty: 60 CAPSULE | Refills: 0 | Status: SHIPPED | OUTPATIENT
Start: 2021-09-02 | End: 2021-10-29

## 2021-09-02 RX ORDER — CALCITRIOL 0.25 UG/1
CAPSULE, LIQUID FILLED ORAL
Qty: 270 CAPSULE | Refills: 4 | Status: SHIPPED | OUTPATIENT
Start: 2021-09-02 | End: 2022-09-16

## 2021-09-02 NOTE — TELEPHONE ENCOUNTER
Calcitriol Oral Capsule 0.25 MCG  Last Written Prescription Date:  8/3/2020  Last Fill Quantity: 270,   # refills: 3  Last Office Visit : 11/20/2020  Future Office visit:  None    Routing refill request to provider for review/approval because:  Drug not on the [facility], [facility] or [facility] refill protocol or controlled substance      Christy Espinosa RN  Central Triage Red Flags/Med Refills

## 2021-09-13 DIAGNOSIS — M79.18 MYOFASCIAL PAIN: ICD-10-CM

## 2021-09-13 DIAGNOSIS — D47.02 MAST CELL DISEASE, SYSTEMIC: Chronic | ICD-10-CM

## 2021-09-14 RX ORDER — HYDROXYZINE HYDROCHLORIDE 25 MG/1
TABLET, FILM COATED ORAL
Qty: 90 TABLET | Refills: 3 | Status: SHIPPED | OUTPATIENT
Start: 2021-09-14 | End: 2021-12-14

## 2021-09-14 RX ORDER — OXYCODONE HYDROCHLORIDE 20 MG/1
20 TABLET ORAL EVERY 4 HOURS
Qty: 84 TABLET | Refills: 0 | Status: SHIPPED | OUTPATIENT
Start: 2021-09-15 | End: 2021-09-28

## 2021-09-15 ENCOUNTER — TELEPHONE (OUTPATIENT)
Dept: PALLIATIVE MEDICINE | Facility: OTHER | Age: 61
End: 2021-09-15

## 2021-09-15 NOTE — TELEPHONE ENCOUNTER
Call placed to patient,unable to leave a VM as mailbox is full.  Need to review whether she would like to  the printed RX for oxycodone or have this mailed to the pharmacy.

## 2021-09-28 DIAGNOSIS — M79.18 MYOFASCIAL PAIN: ICD-10-CM

## 2021-09-28 RX ORDER — OXYCODONE HYDROCHLORIDE 20 MG/1
20 TABLET ORAL EVERY 4 HOURS
Qty: 30 TABLET | Refills: 0 | Status: SHIPPED | OUTPATIENT
Start: 2021-09-29 | End: 2021-09-29

## 2021-09-28 NOTE — TELEPHONE ENCOUNTER
Approved 5 day bridge as covering provider until Dr. Berry back in office on 10/04.  Please route remainder of refill to him to review/approve.

## 2021-09-28 NOTE — TELEPHONE ENCOUNTER
Received call from patient requesting refill(s) of Oxycodone     Last dispensed from pharmacy on 9/15/21    Patient's last office/virtual visit by prescribing provider on 8/11/21  Next office/virtual appointment scheduled for 10/6/21    Last urine drug screen date 11/2020  Current opioid agreement on file (completed within the last year) No Date of opioid agreement: mailed on 8/2021    E-prescribe to [facility] pharmacy  Pending Prescriptions:                       Disp   Refills    oxyCODONE HCl (ROXICODONE) 20 MG TABS imm*84 tab*0            Sig: Take 20 mg by mouth every 4 hours for 14 days

## 2021-09-28 NOTE — TELEPHONE ENCOUNTER
Patient leaves a message requesting a refill of Oxycodone and to be able to  the medication on Wednesday due to her work shifts.

## 2021-10-04 DIAGNOSIS — R11.2 NAUSEA AND VOMITING, INTRACTABILITY OF VOMITING NOT SPECIFIED, UNSPECIFIED VOMITING TYPE: ICD-10-CM

## 2021-10-04 DIAGNOSIS — C85.10 HIGH GRADE B-CELL LYMPHOMA (H): ICD-10-CM

## 2021-10-04 RX ORDER — OXYCODONE HYDROCHLORIDE 20 MG/1
20 TABLET ORAL EVERY 4 HOURS
Qty: 84 TABLET | Refills: 0 | Status: SHIPPED | OUTPATIENT
Start: 2021-10-04 | End: 2021-10-07

## 2021-10-05 NOTE — TELEPHONE ENCOUNTER
Routing refill request to provider for review/approval because:  Appointment needed  Aissatou already given          Pending Prescriptions:                       Disp   Refills    ondansetron (ZOFRAN-ODT) 8 MG ODT tab [Pha*30 tab*0        Sig: Take 1 tablet (8 mg) by mouth every EIGHT hours as           needed        Ford Bhagat RN

## 2021-10-06 ENCOUNTER — VIRTUAL VISIT (OUTPATIENT)
Dept: PALLIATIVE MEDICINE | Facility: OTHER | Age: 61
End: 2021-10-06
Payer: COMMERCIAL

## 2021-10-06 VITALS — HEIGHT: 65 IN | BODY MASS INDEX: 36.11 KG/M2

## 2021-10-06 DIAGNOSIS — M79.18 MYOFASCIAL PAIN: ICD-10-CM

## 2021-10-06 PROCEDURE — 99214 OFFICE O/P EST MOD 30 MIN: CPT | Mod: 95 | Performed by: ANESTHESIOLOGY

## 2021-10-06 RX ORDER — ONDANSETRON 4 MG/1
TABLET, ORALLY DISINTEGRATING ORAL
COMMUNITY
Start: 2021-06-01 | End: 2021-10-18

## 2021-10-06 RX ORDER — TOBRAMYCIN AND DEXAMETHASONE 3; 1 MG/ML; MG/ML
SUSPENSION/ DROPS OPHTHALMIC
COMMUNITY
Start: 2021-01-23

## 2021-10-06 RX ORDER — TOBRAMYCIN 3 MG/ML
SOLUTION/ DROPS OPHTHALMIC
COMMUNITY
Start: 2020-11-06

## 2021-10-06 RX ORDER — MAGNESIUM OXIDE 400 MG/1
400 TABLET ORAL 3 TIMES DAILY
COMMUNITY
Start: 2021-09-29 | End: 2022-09-07

## 2021-10-06 RX ORDER — AMOXICILLIN AND CLAVULANATE POTASSIUM 500; 125 MG/1; MG/1
TABLET, FILM COATED ORAL
COMMUNITY
Start: 2021-09-23 | End: 2021-12-14

## 2021-10-06 RX ORDER — FAMOTIDINE 20 MG/1
20 TABLET, FILM COATED ORAL 2 TIMES DAILY
COMMUNITY
Start: 2021-09-05 | End: 2022-02-28

## 2021-10-06 ASSESSMENT — PAIN SCALES - GENERAL: PAINLEVEL: EXTREME PAIN (9)

## 2021-10-06 NOTE — PROGRESS NOTES
Tisha Arias is a 60 y.o. female who is being evaluated via a billable video visit.       How would you like to obtain your AVS? MyChart.  If dropped from the video visit, the video invitation should be resent by: Text to cell phone: [phone]  Will anyone else be joining your video visit? No  Pain score: 9  Constant or intermittent: constant  What does your pain feel like: throbbing,deep ache, dull, crushing, movement intensifies with pain and muscle spasms  Does the pain interfere with:   Work: yes  Walking/distance: yes  Sleep: yes  Daily activities: yes  Relationships/social life: yes  Mood: yes  F= 8     Maine Ryan LPN

## 2021-10-06 NOTE — LETTER
10/6/2021         RE: Tisha Arias  [address] [place] [address]        Dear Colleague,    Thank you for referring your patient, Tisha Arias, to the [facility]. Please see a copy of my visit note below.    Tisha Arias is a 60 y.o. female who is being evaluated via a billable video visit.       How would you like to obtain your AVS? MyChart.  If dropped from the video visit, the video invitation should be resent by: Text to cell phone: [phone]  Will anyone else be joining your video visit? No  Pain score: 9  Constant or intermittent: constant  What does your pain feel like: throbbing,deep ache, dull, crushing, movement intensifies with pain and muscle spasms  Does the pain interfere with:   Work: yes  Walking/distance: yes  Sleep: yes  Daily activities: yes  Relationships/social life: yes  Mood: yes  F= 8     Maine Ryan LPN       Elvin is a 61 year old who is being evaluated via a billable video visit.      How would you like to obtain your AVS?   If the video visit is dropped, the invitation should be resent by:   Will anyone else be joining your video visit?       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    HPI   Review of some of her pain is changes, different lymph nodes feel swollen and pain.    Reviews taking hydroxyzine 25 to 50 mg with each oxycodone which does seem to help.  She has not had as much itching as before.    Has uses a compounded cream ketamine which is expensive, applies it to different areas find it helpful.    Tinges with Celebrex 200 mg twice a day which she thinks is benefit.    She has been using the oxycodone 20 mg every 4 hours.  She thinks that 30 mg every 4 hours worked best when she was in the past.    She is going back to work again next week dealing with Covid and testing.    Describes trying a variety of  other opioids, not feel that they were effective.    Frequency specific microcurrent was addressed but not helpful.    She has been using ketamine 120 mg lozenges, usually one half to size 3/4 at a time at night which sometimes seems to help.    She has been taking increased magnesium for muscle spasms.    She tried medical cannabis but it did not go well with the tamoxifen.      Current Outpatient Medications:      ACAI RAMIREZ PO, Take 50 mg by mouth, Disp: , Rfl:      acetaminophen (TYLENOL) 325 MG tablet, Take 2 tablets (650 mg) by mouth every 4 hours as needed for mild pain or fever, Disp: 100 tablet, Rfl:      albuterol (VENTOLIN HFA) 108 (90 Base) MCG/ACT inhaler, Inhale 1-2 puffs into the lungs every 4 hours as needed for shortness of breath / dyspnea or wheezing, Disp: 18 g, Rfl: 1     amoxicillin-clavulanate (AUGMENTIN) 500-125 MG tablet, TAKE 1 TABLET BY MOUTH THREE TIMES DAILY TO COMPLETION, Disp: , Rfl:      calcitRIOL (ROCALTROL) 0.25 MCG capsule, take 2 capsules (0.5 mcg) by mouth in the AM and 1 capsule (0.25 mcg) in the PM. Virtual/video visit requested., Disp: 270 capsule, Rfl: 4     calcium citrate-vitamin D (CALCIUM CITRATE + D3) 315-250 MG-UNIT TABS per tablet, Take 2 tablets by mouth 2 times daily, Disp: 60 tablet, Rfl: 2     celecoxib (CELEBREX) 200 MG capsule, Take 1 capsule (200 mg) by mouth 2 times daily, Disp: 60 capsule, Rfl: 0     cetirizine (ZYRTEC ALLERGY) 10 MG tablet, Take 1 tablet (10 mg) by mouth 3 times daily On hold for lab test., Disp: 90 tablet, Rfl: 0     Cholecalciferol (VITAMIN D3) 50 MCG (2000 UT) CAPS, Take 2,000 Units by mouth daily, Disp: 90 capsule, Rfl: 1     cromolyn (OPTICROM) 4 % ophthalmic solution, Place 1 drop into both eyes 4 times daily, Disp: 10 mL, Rfl: 0     cromolyn sodium (NASALCROM) 5.2 MG/ACT nasal aerosol, SPRAY ONE SPRAY( 1 ML) IN NOSTRIL DAILY, Disp: 1 mL, Rfl: 3     cyclobenzaprine (FLEXERIL) 10 MG tablet, Take 1 tablet (10 mg) by mouth 3 times daily  as needed for muscle spasms, Disp: 30 tablet, Rfl: 2     diclofenac (VOLTAREN) 1 % topical gel, Apply affected area two times daily as needed using enclosed dosing card. (2-4 grams to chest wall), Disp: 100 g, Rfl: 1     famotidine (PEPCID) 20 MG tablet, Take 20 mg by mouth 2 times daily, Disp: , Rfl:      fluticasone (FLONASE) 50 MCG/ACT nasal spray, Spray 1-2 sprays into both nostrils daily, Disp: 16 g, Rfl: 2     furosemide (LASIX) 20 MG tablet, Take 1 tablet (20 mg) by mouth 2 times daily, Disp: 90 tablet, Rfl: 3     hydrOXYzine (ATARAX) 25 MG tablet, One to two tabs every six hours for itching, Disp: 90 tablet, Rfl: 3     ketamine HCl POWD, Ketamine 10% and lidocaine 10% topical cream, apply to feet three times a day, 30 mg tube, Disp: 300 g, Rfl: 1     ketamine HCl POWD, 120 mg katharina; 1/2 katharina 5 times a day and 1 katharina at bedtime, Disp: 90 g, Rfl: 2     KLOR-CON 20 MEQ CR tablet, TAKE ONE TABLET BY MOUTH TWICE DAILY, Disp: 60 tablet, Rfl: 0     levothyroxine (SYNTHROID/LEVOTHROID) 112 MCG tablet, Take 2 tablets (224 mcg) by mouth daily, Disp: 180 tablet, Rfl: 4     lidocaine (XYLOCAINE) 5 % external ointment, Apply quarter size amount to chest and back up to 3 times daily as needed for pain., Disp: 350 g, Rfl: 1     lidocaine-prilocaine (EMLA) cream, Apply topically as needed for moderate pain, Disp: 60 g, Rfl: 1     magnesium oxide (MAG-OX) 400 (241.3 Mg) MG tablet, Take 1 tablet (400 mg) by mouth 3 times daily (with meals), Disp: 90 tablet, Rfl: 3     magnesium oxide (MAG-OX) 400 MG tablet, Take 400 mg by mouth 3 times daily, Disp: , Rfl:      Menaquinone-7 (VITAMIN K2) 100 MCG CAPS, Take 1 capsule by mouth daily, Disp: , Rfl:      naloxone (NARCAN) nasal spray, Spray 1 spray (4 mg) into one nostril alternating nostrils as needed for opioid reversal every 2-3 minutes until assistance arrives, Disp: 0.2 mL, Rfl: 0     olopatadine (PATANOL) 0.1 % ophthalmic solution, Apply 1 drop to eye, Disp: , Rfl:       omeprazole (PRILOSEC) 10 MG DR capsule, Take 2 capsules (20 mg) by mouth daily, Disp: 30 capsule, Rfl: 1     ondansetron (ZOFRAN-ODT) 4 MG ODT tab, DISSOLVE 1 TABLET (4 MG) IN MOUTH EVERY 6 (SIX) HOURS AS NEEDED FOR NAUSEA., Disp: , Rfl:      ondansetron (ZOFRAN-ODT) 8 MG ODT tab, Take 1 tablet (8 mg) by mouth every 8 hours as needed for nausea +++NEEDS AN APPOINTMENT FOR FURTHER REFILLS+++, Disp: 30 tablet, Rfl: 0     oxyMORphone HCl 10 MG TABS, Take 10 mg by mouth 3 times daily, Disp: 30 tablet, Rfl: 0     Probiotic Product (PROBIOTIC DAILY PO), Take 1 capsule by mouth daily Lacto acid bifidobacterium, Disp: , Rfl:      ranitidine (ZANTAC) 75 MG tablet, Take 1 tablet (75 mg) by mouth 3 times daily, Disp: 90 tablet, Rfl: 3     SUMAtriptan (IMITREX) 50 MG tablet, +++NEED APPT+++Take 1 tablet by mouth as needed for migraine . May repeat dose in 2 hours as needed. Maximum dose 4 tablets in 24 hours, Disp: 9 tablet, Rfl: 0     tobramycin (TOBREX) 0.3 % ophthalmic solution, ADMINISTER 1 2 DROPS TO THE RIGHT EYE EVERY 4 (FOUR) HOURS FOR 7 DAYS., Disp: , Rfl:      triamcinolone (KENALOG) 0.025 % ointment, Apply topically as needed, Disp: , Rfl: 3     Triamcinolone Acetonide (AZMACORT IN), Inhale 2 puffs into the lungs as needed, Disp: , Rfl:      UNABLE TO FIND, Take 2 capsules by mouth 3 times daily Muscle Mag. 2 caps contain B1 20mg, B2 20mg, B6 10mg, magesium 20mg, manganese 2mg., Disp: , Rfl:      UNABLE TO FIND, 3 tablets 3 times daily MEDICATION NAME: calcium D-Glucarate  3 caps contain 180mg of elemental calcium., Disp: , Rfl:      UNABLE TO FIND, 1 tablet daily MEDICATION NAME: Pure Encapsulations, Disp: , Rfl:      tobramycin-dexamethasone (TOBRADEX) 0.3-0.1 % ophthalmic suspension, INSTILL ONE DROP INTO BOTH EYES THREE TIMES DAILY (Patient not taking: Reported on 10/6/2021), Disp: , Rfl:       She is alert with a clear sensorium good eye contact.  Thought process logical.  Affect is fairly full range.  No  respiratory distress  Review of Systems         Objective           Vitals:  No vitals were obtained today due to virtual visit.    Physical Exam       Plan: We discussed oxymorphone the past, did not recall what was tried as it was the cost concerns.  May have different insurance and will see if the oxymorphone is more potent, and does not be taken as frequently through the day.  We will start with 10 mg 3 times a day.  She will call to adjust dosing stopping the oxymorphone.       total time more than 20 minutes            Video-Visit Details    Type of service:  Video Visit    Video End Time:    Originating Location (pt. Location): Home    Distant Location (provider location):  [facility]     Platform used for Video Visit:       Again, thank you for allowing me to participate in the care of your patient.        Sincerely,        ADRI DIXON MD

## 2021-10-07 RX ORDER — OXYMORPHONE HYDROCHLORIDE 10 MG/1
10 TABLET ORAL 3 TIMES DAILY
Qty: 30 TABLET | Refills: 0 | Status: SHIPPED | OUTPATIENT
Start: 2021-10-07 | End: 2021-10-18

## 2021-10-07 RX ORDER — KETAMINE HCL 100 %
POWDER (GRAM) MISCELLANEOUS
Qty: 300 G | Refills: 1 | Status: SHIPPED | OUTPATIENT
Start: 2021-10-07 | End: 2022-02-25

## 2021-10-07 RX ORDER — OXYCODONE HYDROCHLORIDE 20 MG/1
20 TABLET ORAL EVERY 4 HOURS
Qty: 84 TABLET | Refills: 0 | Status: SHIPPED | OUTPATIENT
Start: 2021-10-07 | End: 2021-10-07

## 2021-10-07 NOTE — PATIENT INSTRUCTIONS
PLAN:  Increase the ketamine topical cream to a 300 mg supply.    Change oxycodone to oxymorphone which may be more potent that last longer, starting with 10 mg 3 times a day.  Call Dr. Berry to adjust doses.    Follow-up in the office in 8 weeks

## 2021-10-07 NOTE — PROGRESS NOTES
Elvin is a 61 year old who is being evaluated via a billable video visit.      How would you like to obtain your AVS?   If the video visit is dropped, the invitation should be resent by:   Will anyone else be joining your video visit?       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    HPI   Review of some of her pain is changes, different lymph nodes feel swollen and pain.    Reviews taking hydroxyzine 25 to 50 mg with each oxycodone which does seem to help.  She has not had as much itching as before.    Has uses a compounded cream ketamine which is expensive, applies it to different areas find it helpful.    Tinges with Celebrex 200 mg twice a day which she thinks is benefit.    She has been using the oxycodone 20 mg every 4 hours.  She thinks that 30 mg every 4 hours worked best when she was in the past.    She is going back to work again next week dealing with Covid and testing.    Describes trying a variety of other opioids, not feel that they were effective.    Frequency specific microcurrent was addressed but not helpful.    She has been using ketamine 120 mg lozenges, usually one half to size 3/4 at a time at night which sometimes seems to help.    She has been taking increased magnesium for muscle spasms.    She tried medical cannabis but it did not go well with the tamoxifen.      Current Outpatient Medications:      ACAI RAMIREZ PO, Take 50 mg by mouth, Disp: , Rfl:      acetaminophen (TYLENOL) 325 MG tablet, Take 2 tablets (650 mg) by mouth every 4 hours as needed for mild pain or fever, Disp: 100 tablet, Rfl:      albuterol (VENTOLIN HFA) 108 (90 Base) MCG/ACT inhaler, Inhale 1-2 puffs into the lungs every 4 hours as needed for shortness of breath / dyspnea or wheezing, Disp: 18 g, Rfl: 1     amoxicillin-clavulanate (AUGMENTIN) 500-125 MG tablet, TAKE 1 TABLET BY MOUTH THREE TIMES DAILY TO  COMPLETION, Disp: , Rfl:      calcitRIOL (ROCALTROL) 0.25 MCG capsule, take 2 capsules (0.5 mcg) by mouth in the AM and 1 capsule (0.25 mcg) in the PM. Virtual/video visit requested., Disp: 270 capsule, Rfl: 4     calcium citrate-vitamin D (CALCIUM CITRATE + D3) 315-250 MG-UNIT TABS per tablet, Take 2 tablets by mouth 2 times daily, Disp: 60 tablet, Rfl: 2     celecoxib (CELEBREX) 200 MG capsule, Take 1 capsule (200 mg) by mouth 2 times daily, Disp: 60 capsule, Rfl: 0     cetirizine (ZYRTEC ALLERGY) 10 MG tablet, Take 1 tablet (10 mg) by mouth 3 times daily On hold for lab test., Disp: 90 tablet, Rfl: 0     Cholecalciferol (VITAMIN D3) 50 MCG (2000 UT) CAPS, Take 2,000 Units by mouth daily, Disp: 90 capsule, Rfl: 1     cromolyn (OPTICROM) 4 % ophthalmic solution, Place 1 drop into both eyes 4 times daily, Disp: 10 mL, Rfl: 0     cromolyn sodium (NASALCROM) 5.2 MG/ACT nasal aerosol, SPRAY ONE SPRAY( 1 ML) IN NOSTRIL DAILY, Disp: 1 mL, Rfl: 3     cyclobenzaprine (FLEXERIL) 10 MG tablet, Take 1 tablet (10 mg) by mouth 3 times daily as needed for muscle spasms, Disp: 30 tablet, Rfl: 2     diclofenac (VOLTAREN) 1 % topical gel, Apply affected area two times daily as needed using enclosed dosing card. (2-4 grams to chest wall), Disp: 100 g, Rfl: 1     famotidine (PEPCID) 20 MG tablet, Take 20 mg by mouth 2 times daily, Disp: , Rfl:      fluticasone (FLONASE) 50 MCG/ACT nasal spray, Spray 1-2 sprays into both nostrils daily, Disp: 16 g, Rfl: 2     furosemide (LASIX) 20 MG tablet, Take 1 tablet (20 mg) by mouth 2 times daily, Disp: 90 tablet, Rfl: 3     hydrOXYzine (ATARAX) 25 MG tablet, One to two tabs every six hours for itching, Disp: 90 tablet, Rfl: 3     ketamine HCl POWD, Ketamine 10% and lidocaine 10% topical cream, apply to feet three times a day, 30 mg tube, Disp: 300 g, Rfl: 1     ketamine HCl POWD, 120 mg katharina; 1/2 katharina 5 times a day and 1 katharina at bedtime, Disp: 90 g, Rfl: 2     KLOR-CON 20 MEQ CR tablet,  TAKE ONE TABLET BY MOUTH TWICE DAILY, Disp: 60 tablet, Rfl: 0     levothyroxine (SYNTHROID/LEVOTHROID) 112 MCG tablet, Take 2 tablets (224 mcg) by mouth daily, Disp: 180 tablet, Rfl: 4     lidocaine (XYLOCAINE) 5 % external ointment, Apply quarter size amount to chest and back up to 3 times daily as needed for pain., Disp: 350 g, Rfl: 1     lidocaine-prilocaine (EMLA) cream, Apply topically as needed for moderate pain, Disp: 60 g, Rfl: 1     magnesium oxide (MAG-OX) 400 (241.3 Mg) MG tablet, Take 1 tablet (400 mg) by mouth 3 times daily (with meals), Disp: 90 tablet, Rfl: 3     magnesium oxide (MAG-OX) 400 MG tablet, Take 400 mg by mouth 3 times daily, Disp: , Rfl:      Menaquinone-7 (VITAMIN K2) 100 MCG CAPS, Take 1 capsule by mouth daily, Disp: , Rfl:      naloxone (NARCAN) nasal spray, Spray 1 spray (4 mg) into one nostril alternating nostrils as needed for opioid reversal every 2-3 minutes until assistance arrives, Disp: 0.2 mL, Rfl: 0     olopatadine (PATANOL) 0.1 % ophthalmic solution, Apply 1 drop to eye, Disp: , Rfl:      omeprazole (PRILOSEC) 10 MG DR capsule, Take 2 capsules (20 mg) by mouth daily, Disp: 30 capsule, Rfl: 1     ondansetron (ZOFRAN-ODT) 4 MG ODT tab, DISSOLVE 1 TABLET (4 MG) IN MOUTH EVERY 6 (SIX) HOURS AS NEEDED FOR NAUSEA., Disp: , Rfl:      ondansetron (ZOFRAN-ODT) 8 MG ODT tab, Take 1 tablet (8 mg) by mouth every 8 hours as needed for nausea +++NEEDS AN APPOINTMENT FOR FURTHER REFILLS+++, Disp: 30 tablet, Rfl: 0     oxyMORphone HCl 10 MG TABS, Take 10 mg by mouth 3 times daily, Disp: 30 tablet, Rfl: 0     Probiotic Product (PROBIOTIC DAILY PO), Take 1 capsule by mouth daily Lacto acid bifidobacterium, Disp: , Rfl:      ranitidine (ZANTAC) 75 MG tablet, Take 1 tablet (75 mg) by mouth 3 times daily, Disp: 90 tablet, Rfl: 3     SUMAtriptan (IMITREX) 50 MG tablet, +++NEED APPT+++Take 1 tablet by mouth as needed for migraine . May repeat dose in 2 hours as needed. Maximum dose 4 tablets in 24  hours, Disp: 9 tablet, Rfl: 0     tobramycin (TOBREX) 0.3 % ophthalmic solution, ADMINISTER 1 2 DROPS TO THE RIGHT EYE EVERY 4 (FOUR) HOURS FOR 7 DAYS., Disp: , Rfl:      triamcinolone (KENALOG) 0.025 % ointment, Apply topically as needed, Disp: , Rfl: 3     Triamcinolone Acetonide (AZMACORT IN), Inhale 2 puffs into the lungs as needed, Disp: , Rfl:      UNABLE TO FIND, Take 2 capsules by mouth 3 times daily Muscle Mag. 2 caps contain B1 20mg, B2 20mg, B6 10mg, magesium 20mg, manganese 2mg., Disp: , Rfl:      UNABLE TO FIND, 3 tablets 3 times daily MEDICATION NAME: calcium D-Glucarate  3 caps contain 180mg of elemental calcium., Disp: , Rfl:      UNABLE TO FIND, 1 tablet daily MEDICATION NAME: Pure Encapsulations, Disp: , Rfl:      tobramycin-dexamethasone (TOBRADEX) 0.3-0.1 % ophthalmic suspension, INSTILL ONE DROP INTO BOTH EYES THREE TIMES DAILY (Patient not taking: Reported on 10/6/2021), Disp: , Rfl:       She is alert with a clear sensorium good eye contact.  Thought process logical.  Affect is fairly full range.  No respiratory distress  Review of Systems         Objective           Vitals:  No vitals were obtained today due to virtual visit.    Physical Exam       Plan: We discussed oxymorphone the past, did not recall what was tried as it was the cost concerns.  May have different insurance and will see if the oxymorphone is more potent, and does not be taken as frequently through the day.  We will start with 10 mg 3 times a day.  She will call to adjust dosing stopping the oxymorphone.       total time more than 20 minutes            Video-Visit Details    Type of service:  Video Visit    Video End Time:    Originating Location (pt. Location): Home    Distant Location (provider location):  [facility]     Platform used for Video Visit:

## 2021-10-08 ENCOUNTER — TELEPHONE (OUTPATIENT)
Dept: PALLIATIVE MEDICINE | Facility: OTHER | Age: 61
End: 2021-10-08
Payer: COMMERCIAL

## 2021-10-08 RX ORDER — ONDANSETRON 8 MG/1
TABLET, ORALLY DISINTEGRATING ORAL
Qty: 30 TABLET | Refills: 0 | OUTPATIENT
Start: 2021-10-08

## 2021-10-08 NOTE — TELEPHONE ENCOUNTER
Attempt to reach patient. Voice mailbox full. Please assist pt with scheduling if they call back.

## 2021-10-08 NOTE — TELEPHONE ENCOUNTER
Pharmacy calls, asking about recent RX sent for oxymorphone.  Is this in addition to oxycodone or to be taken instead of oxycodone

## 2021-10-12 ENCOUNTER — VIRTUAL VISIT (OUTPATIENT)
Dept: FAMILY MEDICINE | Facility: CLINIC | Age: 61
End: 2021-10-12

## 2021-10-12 DIAGNOSIS — Z91.018 TREE NUT ALLERGY: ICD-10-CM

## 2021-10-12 DIAGNOSIS — G43.519 INTRACTABLE PERSISTENT MIGRAINE AURA WITHOUT CEREBRAL INFARCTION AND WITHOUT STATUS MIGRAINOSUS: Primary | ICD-10-CM

## 2021-10-12 DIAGNOSIS — C85.10 HIGH GRADE B-CELL LYMPHOMA (H): ICD-10-CM

## 2021-10-12 DIAGNOSIS — E55.9 VITAMIN D DEFICIENCY: ICD-10-CM

## 2021-10-12 DIAGNOSIS — R11.2 NAUSEA AND VOMITING, INTRACTABILITY OF VOMITING NOT SPECIFIED, UNSPECIFIED VOMITING TYPE: ICD-10-CM

## 2021-10-12 PROCEDURE — 99214 OFFICE O/P EST MOD 30 MIN: CPT | Mod: 95 | Performed by: FAMILY MEDICINE

## 2021-10-12 RX ORDER — EPINEPHRINE 0.3 MG/.3ML
0.3 INJECTION SUBCUTANEOUS ONCE
Qty: 2 EACH | Refills: 5 | Status: SHIPPED | OUTPATIENT
Start: 2021-10-12 | End: 2024-08-14

## 2021-10-12 RX ORDER — ONDANSETRON 8 MG/1
8 TABLET, ORALLY DISINTEGRATING ORAL EVERY 8 HOURS PRN
Qty: 30 TABLET | Refills: 4 | Status: SHIPPED | OUTPATIENT
Start: 2021-10-12 | End: 2022-01-28

## 2021-10-12 RX ORDER — SUMATRIPTAN 50 MG/1
TABLET, FILM COATED ORAL
Qty: 9 TABLET | Refills: 11 | Status: SHIPPED | OUTPATIENT
Start: 2021-10-12 | End: 2022-08-01

## 2021-10-12 NOTE — PROGRESS NOTES
Elvin is a 61 year old who is being evaluated via a billable telephone visit.      What phone number would you like to be contacted at? [phone]  How would you like to obtain your AVS? MyChart    Assessment & Plan       ICD-10-CM    1. Intractable persistent migraine aura without cerebral infarction and without status migrainosus  G43.519 SUMAtriptan (IMITREX) 50 MG tablet   2. High grade B-cell lymphoma (H)  C85.10 ondansetron (ZOFRAN-ODT) 8 MG ODT tab   3. Nausea and vomiting, intractability of vomiting not specified, unspecified vomiting type  R11.2 ondansetron (ZOFRAN-ODT) 8 MG ODT tab   4. Vitamin D deficiency  E55.9 Vitamin D Deficiency   5. Tree nut allergy  Z91.018 EPINEPHrine (EPIPEN 2-CARIDAD) 0.3 MG/0.3ML injection 2-pack         Review of external notes as documented elsewhere in note        Return in about 1 week (around 10/19/2021) for lab.    Pablo Zarco MD  [facility]   Elvin is a 61 year old who presents for the following health issues     HPI     Chief Complaint   Patient presents with     Recheck Medication     Vitamin D levels are low and wants to increase dose     Needs epipen refill  Per testing in 2015 severe allergy to tree nuts    vitamin d levels were low in 2020  Dose increase was recommended  Patient would like new prescription for this  Does have chronic fatigue    Patient needs refill for imitrex abortive therapy    Needs refill of zofran              Review of Systems   Constitutional, HEENT, cardiovascular, pulmonary, gi and gu systems are negative, except as otherwise noted.      Objective           Vitals:  No vitals were obtained today due to virtual visit.    Physical Exam   healthy, alert and no distress  PSYCH: Alert and oriented times 3; coherent speech, normal   rate and volume, able to articulate logical thoughts, able   to abstract reason, no tangential thoughts, no hallucinations   or delusions  Her affect is normal  RESP: No  cough, no audible wheezing, able to talk in full sentences  Remainder of exam unable to be completed due to telephone visits                Phone call duration: 15 minutes

## 2021-10-14 ENCOUNTER — TELEPHONE (OUTPATIENT)
Dept: PALLIATIVE MEDICINE | Facility: OTHER | Age: 61
End: 2021-10-14

## 2021-10-14 DIAGNOSIS — M79.18 MYOFASCIAL PAIN: ICD-10-CM

## 2021-10-14 RX ORDER — KETAMINE HCL 100 %
POWDER (GRAM) MISCELLANEOUS
Qty: 300 G | Refills: 1 | Status: CANCELLED | OUTPATIENT
Start: 2021-10-14

## 2021-10-14 NOTE — TELEPHONE ENCOUNTER
Pharmacy calls, questions regarding the supply/quantity written on ketamine ointment RX.  Call placed to pharmacy:  Reviewed that it will make the most sense to fill a quantity of 90 grams and then allowing 3 additional refills depending on efficacy for the patient.  This will allow patient to only pay 1 copay for the entire month's supply.  No need to adjust the current script, this script would last approximately 4 months if patient continues to use this.

## 2021-10-15 DIAGNOSIS — M79.18 MYOFASCIAL PAIN: ICD-10-CM

## 2021-10-15 RX ORDER — OXYMORPHONE HYDROCHLORIDE 10 MG/1
10 TABLET ORAL 3 TIMES DAILY
Qty: 30 TABLET | Refills: 0 | Status: CANCELLED | OUTPATIENT
Start: 2021-10-17 | End: 2021-10-27

## 2021-10-15 NOTE — TELEPHONE ENCOUNTER
Received call from patient requesting refill(s) of Oxymorphone     Last dispensed from pharmacy on not on outside med list    Patient's last office/virtual visit by prescribing provider on 10/6/21  Next office/virtual appointment scheduled for not scheduled    Last urine drug screen date 11/2020  Current opioid agreement on file (completed within the last year) Yes Date of opioid agreement: 11/2020    E-prescribe to [facility] pharmacy  Pending Prescriptions:                       Disp   Refills    oxyMORphone HCl 10 MG TABS                30 tab*0            Sig: Take 10 mg by mouth 3 times daily for 10 days

## 2021-10-17 ENCOUNTER — VIRTUAL VISIT (OUTPATIENT)
Dept: URGENT CARE | Facility: CLINIC | Age: 61
End: 2021-10-17

## 2021-10-17 DIAGNOSIS — H10.9 CONJUNCTIVITIS, UNSPECIFIED CONJUNCTIVITIS TYPE, UNSPECIFIED LATERALITY: Primary | ICD-10-CM

## 2021-10-17 PROCEDURE — 99213 OFFICE O/P EST LOW 20 MIN: CPT | Mod: 95 | Performed by: PHYSICIAN ASSISTANT

## 2021-10-17 RX ORDER — TOBRAMYCIN AND DEXAMETHASONE 3; 1 MG/ML; MG/ML
1-2 SUSPENSION/ DROPS OPHTHALMIC 3 TIMES DAILY
Qty: 10 ML | Refills: 0 | Status: SHIPPED | OUTPATIENT
Start: 2021-10-17 | End: 2021-10-24

## 2021-10-17 NOTE — PROGRESS NOTES
Elvin is a 61 year old who is being evaluated via a billable video visit.      Video Start Time: 11:14 AM    Assessment & Plan     Conjunctivitis, unspecified conjunctivitis type, unspecified laterality    - tobramycin-dexamethasone (TOBRADEX) 0.3-0.1 % ophthalmic suspension; Place 1-2 drops into both eyes 3 times daily for 7 days    I will treat with tobradex and have patient follow up with eye specialist if symptoms are persisting next week.        Amber Chapman PA-C  Virtual Urgent Care  [facility]    Subjective   Elvin is a 61 year old who presents for the following health issues: red eye    HPI - Patient has been having eye redness and stringy yellow drainage the last week She has a history of recurrent conjunctivitis that she thinks is due to chronic dry eye issues. She has not had fevers or chills or other associated symptoms.     Review of Systems   Constitutional, HEENT, cardiovascular, pulmonary, gi and gu systems are negative, except as otherwise noted.      Objective           Vitals:  No vitals were obtained today due to virtual visit.    Physical Exam   GENERAL: Healthy, alert and no distress  EYES: PERRL, EOMI and conjunctiva/corneas- conjunctival injection bilateral with redness and some exudate on the lashes  RESP: No audible wheeze, cough, or visible cyanosis.  No visible retractions or increased work of breathing.    SKIN: Visible skin clear. No significant rash, abnormal pigmentation or lesions.  PSYCH: Mentation appears normal, affect normal/bright, judgement and insight intact, normal speech and appearance well-groomed.          Video-Visit Details    Type of service:  Video Visit    Video End Time:11:20 AM    Originating Location (pt. Location): Home    Distant Location (provider location):  [facility]     Platform used for Video Visit: Signdat

## 2021-10-18 ENCOUNTER — MYC MEDICAL ADVICE (OUTPATIENT)
Dept: PALLIATIVE MEDICINE | Facility: OTHER | Age: 61
End: 2021-10-18

## 2021-10-18 ENCOUNTER — TELEPHONE (OUTPATIENT)
Dept: PALLIATIVE MEDICINE | Facility: OTHER | Age: 61
End: 2021-10-18

## 2021-10-18 ENCOUNTER — LAB (OUTPATIENT)
Dept: LAB | Facility: CLINIC | Age: 61
End: 2021-10-18

## 2021-10-18 DIAGNOSIS — E89.0 POSTSURGICAL HYPOTHYROIDISM: ICD-10-CM

## 2021-10-18 DIAGNOSIS — Z98.84 HISTORY OF GASTRIC BYPASS: ICD-10-CM

## 2021-10-18 DIAGNOSIS — G89.4 CHRONIC PAIN SYNDROME: Primary | ICD-10-CM

## 2021-10-18 DIAGNOSIS — E55.9 VITAMIN D DEFICIENCY: ICD-10-CM

## 2021-10-18 DIAGNOSIS — C83.38 DIFFUSE LARGE B-CELL LYMPHOMA OF LYMPH NODES OF MULTIPLE REGIONS (H): ICD-10-CM

## 2021-10-18 DIAGNOSIS — M79.18 MYOFASCIAL PAIN: ICD-10-CM

## 2021-10-18 DIAGNOSIS — C73 PAPILLARY CARCINOMA, FOLLICULAR VARIANT (H): ICD-10-CM

## 2021-10-18 DIAGNOSIS — R25.2 MUSCLE CRAMPS: ICD-10-CM

## 2021-10-18 DIAGNOSIS — E83.51 HYPOCALCEMIA: ICD-10-CM

## 2021-10-18 LAB
ALBUMIN SERPL-MCNC: 3.7 G/DL (ref 3.4–5)
ALP SERPL-CCNC: 136 U/L (ref 40–150)
ALT SERPL W P-5'-P-CCNC: 51 U/L (ref 0–50)
ANION GAP SERPL CALCULATED.3IONS-SCNC: 7 MMOL/L (ref 3–14)
AST SERPL W P-5'-P-CCNC: 66 U/L (ref 0–45)
BASOPHILS # BLD AUTO: 0 10E3/UL (ref 0–0.2)
BASOPHILS NFR BLD AUTO: 0 %
BILIRUB SERPL-MCNC: 0.4 MG/DL (ref 0.2–1.3)
BUN SERPL-MCNC: 12 MG/DL (ref 7–30)
CA-I BLD-MCNC: 4.1 MG/DL (ref 4.4–5.2)
CALCIUM SERPL-MCNC: 8.1 MG/DL (ref 8.5–10.1)
CHLORIDE BLD-SCNC: 103 MMOL/L (ref 94–109)
CO2 SERPL-SCNC: 28 MMOL/L (ref 20–32)
CREAT SERPL-MCNC: 0.88 MG/DL (ref 0.52–1.04)
EOSINOPHIL # BLD AUTO: 0.2 10E3/UL (ref 0–0.7)
EOSINOPHIL NFR BLD AUTO: 3 %
ERYTHROCYTE [DISTWIDTH] IN BLOOD BY AUTOMATED COUNT: 12.4 % (ref 10–15)
GFR SERPL CREATININE-BSD FRML MDRD: 71 ML/MIN/1.73M2
GLUCOSE BLD-MCNC: 112 MG/DL (ref 70–99)
HCT VFR BLD AUTO: 42.6 % (ref 35–47)
HGB BLD-MCNC: 14.9 G/DL (ref 11.7–15.7)
LDH SERPL L TO P-CCNC: 288 U/L (ref 81–234)
LYMPHOCYTES # BLD AUTO: 0.9 10E3/UL (ref 0.8–5.3)
LYMPHOCYTES NFR BLD AUTO: 17 %
MAGNESIUM SERPL-MCNC: 2.6 MG/DL (ref 1.6–2.3)
MCH RBC QN AUTO: 31.6 PG (ref 26.5–33)
MCHC RBC AUTO-ENTMCNC: 35 G/DL (ref 31.5–36.5)
MCV RBC AUTO: 90 FL (ref 78–100)
MONOCYTES # BLD AUTO: 0.4 10E3/UL (ref 0–1.3)
MONOCYTES NFR BLD AUTO: 7 %
NEUTROPHILS # BLD AUTO: 3.6 10E3/UL (ref 1.6–8.3)
NEUTROPHILS NFR BLD AUTO: 72 %
PLATELET # BLD AUTO: 251 10E3/UL (ref 150–450)
POTASSIUM BLD-SCNC: 3.8 MMOL/L (ref 3.4–5.3)
PROT SERPL-MCNC: 7.8 G/DL (ref 6.8–8.8)
PTH-INTACT SERPL-MCNC: 39 PG/ML (ref 18–80)
RBC # BLD AUTO: 4.72 10E6/UL (ref 3.8–5.2)
SODIUM SERPL-SCNC: 138 MMOL/L (ref 133–144)
T4 FREE SERPL-MCNC: 1.37 NG/DL (ref 0.76–1.46)
TSH SERPL DL<=0.005 MIU/L-ACNC: 0.26 MU/L (ref 0.4–4)
VIT B12 SERPL-MCNC: 286 PG/ML (ref 193–986)
WBC # BLD AUTO: 5 10E3/UL (ref 4–11)

## 2021-10-18 PROCEDURE — 82330 ASSAY OF CALCIUM: CPT

## 2021-10-18 PROCEDURE — 84432 ASSAY OF THYROGLOBULIN: CPT | Mod: 90

## 2021-10-18 PROCEDURE — 84207 ASSAY OF VITAMIN B-6: CPT | Mod: 90

## 2021-10-18 PROCEDURE — 82306 VITAMIN D 25 HYDROXY: CPT

## 2021-10-18 PROCEDURE — 82607 VITAMIN B-12: CPT

## 2021-10-18 PROCEDURE — 84439 ASSAY OF FREE THYROXINE: CPT

## 2021-10-18 PROCEDURE — 84630 ASSAY OF ZINC: CPT | Mod: 90

## 2021-10-18 PROCEDURE — 80050 GENERAL HEALTH PANEL: CPT

## 2021-10-18 PROCEDURE — 83970 ASSAY OF PARATHORMONE: CPT

## 2021-10-18 PROCEDURE — 99000 SPECIMEN HANDLING OFFICE-LAB: CPT

## 2021-10-18 PROCEDURE — 83615 LACTATE (LD) (LDH) ENZYME: CPT

## 2021-10-18 PROCEDURE — 36415 COLL VENOUS BLD VENIPUNCTURE: CPT

## 2021-10-18 PROCEDURE — 83735 ASSAY OF MAGNESIUM: CPT

## 2021-10-18 PROCEDURE — 86800 THYROGLOBULIN ANTIBODY: CPT | Mod: 90

## 2021-10-18 RX ORDER — OXYMORPHONE HYDROCHLORIDE 10 MG/1
10 TABLET ORAL 3 TIMES DAILY
Qty: 30 TABLET | Refills: 0 | Status: SHIPPED | OUTPATIENT
Start: 2021-10-18 | End: 2021-10-18

## 2021-10-18 RX ORDER — OXYCODONE HYDROCHLORIDE 20 MG/1
TABLET ORAL
Qty: 84 TABLET | Refills: 0 | Status: SHIPPED | OUTPATIENT
Start: 2021-10-18 | End: 2021-10-29

## 2021-10-18 NOTE — TELEPHONE ENCOUNTER
Pt left message requesting that her hydromorphone prescription be changed to oxycodone because she can't afford the hydromorphone. Please return call.

## 2021-10-18 NOTE — TELEPHONE ENCOUNTER
[facility] does have Oxymorphone 10mg Tablets in stock.  Queued  Pending Prescriptions:                       Disp   Refills    oxyMORphone HCl 10 MG TABS                30 tab*0            Sig: Take 10 mg by mouth 3 times daily

## 2021-10-18 NOTE — TELEPHONE ENCOUNTER
Pharmacy calls, unable to fill oxymorphone until delivery reaches pharmacy which is potentially later this week.  Call placed to [facility]:  Reviewed stock of other [facility] pharmacies, only [facility] that has this in stock is quite a distance from the patient.    My chart message sent to patient requesting for her to find a new pharmacy.

## 2021-10-19 LAB — DEPRECATED CALCIDIOL+CALCIFEROL SERPL-MC: 37 UG/L (ref 20–75)

## 2021-10-20 LAB
PYRIDOXAL PHOS SERPL-SCNC: 28.2 NMOL/L
ZINC SERPL-MCNC: 94.5 UG/DL

## 2021-10-23 ENCOUNTER — HEALTH MAINTENANCE LETTER (OUTPATIENT)
Age: 61
End: 2021-10-23

## 2021-10-26 LAB — SCANNED LAB RESULT: NORMAL

## 2021-10-28 DIAGNOSIS — R07.89 CHEST WALL PAIN: ICD-10-CM

## 2021-10-29 DIAGNOSIS — G89.4 CHRONIC PAIN SYNDROME: ICD-10-CM

## 2021-10-29 RX ORDER — CELECOXIB 200 MG/1
CAPSULE ORAL
Qty: 60 CAPSULE | Refills: 0 | Status: SHIPPED | OUTPATIENT
Start: 2021-10-29 | End: 2021-12-20

## 2021-10-29 NOTE — TELEPHONE ENCOUNTER
Received call from patient requesting refill(s) of Oxycodone 20 mg    Last dispensed from pharmacy on 10/18 according to outside med list    Patient's last office/virtual visit by prescribing provider on 10/6-BE  Next office/virtual appointment- not yet scheduled, but left  msg to call back to schedule.    Last urine drug screen date 11/13/20  Current opioid agreement on file (completed within the last year) No      Form was mailed to pt's home on 8/6/21 but not found in her record       E-prescribe to [facility] pharmacy

## 2021-11-01 RX ORDER — OXYCODONE HYDROCHLORIDE 20 MG/1
TABLET ORAL
Qty: 84 TABLET | Refills: 0 | Status: SHIPPED | OUTPATIENT
Start: 2021-11-01 | End: 2021-11-15

## 2021-11-01 NOTE — TELEPHONE ENCOUNTER
Received call from Ludy at [facility] pharmacy. Ludy states they have still not received a prescription for oxyCODONE HCl (ROXICODONE) 20 MG TABS immediate release tablet and wanted to confirm the status of this request.     Per 10/29/21 note:  PHARMACY called to get refill for oxyCODONE HCl (ROXICODONE) 20 MG TABS/ called the patient to schedule follow up       By Ginette Olson        E-prescribe to:  [facility] #[id number] [address]  Phone: [phone] Fax: [phone]    Routing to nursing to advise.     Karen Weber [facility]

## 2021-11-01 NOTE — TELEPHONE ENCOUNTER
Pt calling about oxycodone refill, stated pharmacy said they would call, but wanted to leave a message also as she knew there had been trouble with phone lines.

## 2021-11-04 DIAGNOSIS — Z91.018 TREE NUT ALLERGY: Primary | ICD-10-CM

## 2021-11-06 NOTE — TELEPHONE ENCOUNTER
Routing refill request to provider for review/approval because:  Drug not on the [facility] refill protocol Leukotriene Inhibitors Protocol [id number]/04/2021 04:08 PM   Asthma control assessment score within normal limits in last 6 months Protocol Details    Medication is active on med list

## 2021-11-12 RX ORDER — MONTELUKAST SODIUM 10 MG/1
TABLET ORAL
Qty: 120 TABLET | Refills: 0 | Status: SHIPPED | OUTPATIENT
Start: 2021-11-12 | End: 2022-12-11

## 2021-11-15 ENCOUNTER — TELEPHONE (OUTPATIENT)
Dept: PALLIATIVE MEDICINE | Facility: OTHER | Age: 61
End: 2021-11-15
Payer: COMMERCIAL

## 2021-11-15 DIAGNOSIS — G89.4 CHRONIC PAIN SYNDROME: ICD-10-CM

## 2021-11-15 RX ORDER — OXYCODONE HYDROCHLORIDE 20 MG/1
TABLET ORAL
Qty: 84 TABLET | Refills: 0 | Status: SHIPPED | OUTPATIENT
Start: 2021-11-15 | End: 2021-11-29

## 2021-11-15 NOTE — TELEPHONE ENCOUNTER
Received call from patient requesting refill(s) of oxyCODONE HCl (ROXICODONE) 20 MG TABS immediate release tablet     Last dispensed from pharmacy on 11/1/21 (14 day supply)    Patient's last office/virtual visit by prescribing provider on 10/06/21 BE    Next office/virtual appointment scheduled for 12/14/21 BE    Last urine drug screen date 11/13/20    Current opioid agreement on file (completed within the last year) No Date of opioid agreement: 10/23/19    E-prescribe to   [facility] #[id number] - [place] - [address] [place]  [address] [address] [place] [address]  Phone: [phone] Fax: [phone]    Will route to nursing [place] for review and preparation of prescription(s).     Karen Weber, [facility]

## 2021-11-16 DIAGNOSIS — J45.20 MILD INTERMITTENT ASTHMA WITHOUT COMPLICATION: ICD-10-CM

## 2021-11-16 RX ORDER — ALBUTEROL SULFATE 90 UG/1
1-2 AEROSOL, METERED RESPIRATORY (INHALATION) EVERY 4 HOURS PRN
Qty: 18 G | Refills: 11 | Status: SHIPPED | OUTPATIENT
Start: 2021-11-16 | End: 2022-09-14

## 2021-11-22 ENCOUNTER — VIRTUAL VISIT (OUTPATIENT)
Dept: ONCOLOGY | Facility: CLINIC | Age: 61
End: 2021-11-22
Attending: INTERNAL MEDICINE
Payer: COMMERCIAL

## 2021-11-22 DIAGNOSIS — E89.0 POSTSURGICAL HYPOTHYROIDISM: ICD-10-CM

## 2021-11-22 DIAGNOSIS — C73 PAPILLARY CARCINOMA, FOLLICULAR VARIANT (H): ICD-10-CM

## 2021-11-22 DIAGNOSIS — E89.2 POSTSURGICAL HYPOPARATHYROIDISM (H): ICD-10-CM

## 2021-11-22 DIAGNOSIS — C83.30 DIFFUSE LARGE B-CELL LYMPHOMA, UNSPECIFIED BODY REGION (H): Primary | ICD-10-CM

## 2021-11-22 DIAGNOSIS — Z85.3 PERSONAL HISTORY OF MALIGNANT NEOPLASM OF BREAST: ICD-10-CM

## 2021-11-22 PROCEDURE — 99214 OFFICE O/P EST MOD 30 MIN: CPT | Mod: GT | Performed by: INTERNAL MEDICINE

## 2021-11-22 PROCEDURE — 999N001193 HC VIDEO/TELEPHONE VISIT; NO CHARGE

## 2021-11-22 NOTE — LETTER
11/22/2021         RE: Tisha Arias  [address] [place] [address]        Dear Colleague,    Thank you for referring your patient, Tisha Arias, to the [facility]. Please see a copy of my visit note below.    Elvin is a 61 year old who is being evaluated via a billable video visit.      How would you like to obtain your AVS? Mail a copy  If the video visit is dropped, the invitation should be resent by: Text to cell phone: [phone]  Will anyone else be joining your video visit? No      Video Time:  15 min with additional 15 min for chart review and coordination of care.  Video-Visit Details    Type of service:  Video Visit    Originating Location (pt. Location): Home    Distant Location (provider location):  [facility]     Platform used for Video Visit: [employer]    REASON FOR VISIT:  F/u breast cancer and thyroid cancer and lymphoma     HISTORY OF PRESENT ILLNESS:  Tisha Arias is a 61 year-old woman, postmenopausal, with a history of T1c N0 M0, infiltrating ductal carcinoma of the right breast with a strong family history of breast cancer as well as thyroid cancer and a history of DLBCL. BRCA1 and 2 gene testing negative.    Cancer Treatment History for her breast cancer and DLBCL:  -She was diagnosed with breast cancer in 12/2011.    -She underwent bilateral mastectomy with immediate reconstruction on 01/30/2012 by Dr. Daugherty.  Her final pathology revealed 1.6-cm, infiltrating ductal carcinoma, grade 1/3, no angiolymphatic space invasion, no nipple or skin invasion.  There was associated DCIS, and the margins were free of tumor superiorly, anterior margin by 0.1 cm, and widely free at remaining margins.  This was ER/NC-positive, HER2-negative in the vast majority of cells and non-amplified with a ratio of 1.1.  She had an Oncotype test performed, which revealed a low-risk score of 11.  That put her overall risk of  recurrence at about 7% after 5 years of tamoxifen.    - in 02/2012, she was started on Arimidex.    - 12/2012, she had prophylactic hysterectomy and oophorectomy, the pathology of which was benign.    - Her course has been complicated by extreme chest wall pain, myofascial pain, and neuropathic pain.  She has gone through several different clinics and was put on several different medications, which were not doing her any good.  Eventually she was weaned off all the medications and is currently doing only PT with myofascial pain maneuvers with symptomatic improvement.   - 2/2014 switched from arimidex to tamoxifen  - presented to the ER on 9/1/17 for chest pain. CT-PA was negative for PE but showed right 3cm paraspinal mass and subcarinal adenopathy. PET on 9/6/17 showed the paraspinal mass to be hypermetabolic along with hilar and mediastinal nodes.   - biopsy of T10 vertebral body on 9/15/17 showed high-grade B cell lymphoma   - FNA EUS of subcarinal LN on 9/14 showed mostly necrotic tissue with suspicion for malignancy   - pt received DA-EPOCH under the care of Dr Garima Sauceda    Thyroid cancer history: Incidentally, given that she was having so many symptoms, she underwent a PET scan in 4/2013, which revealed a thyroid nodule which was biopsied and was consistent with papillary carcinoma.  She has undergone resection as well as radioiodine treatment since and has done relatively well from the surgery.  She follows up with Dr. Castro for the same.  She unfortunately required etoh ablation therapy over the thyroid lymph nodes.   She remains under the care of Dr Avelina Castro.       INTERVAL HISTORY: Elvin comes in today for follow up.  She is no longer on tamoxifen.      She continues to have intense muscle spasms across her chest; this is different than prior, with the back pain.       She sees Dr Berry in pain/palliative care for ongoing pain - neuropathy, chest wall pain.  She is on celebrex 200  "twice daily, oxycodone 20 mg IR every four hours, and ketamine.  She takes ketamine 120 mg lozenges, 1/2 lozenge 5 times a day and 1 at bedtime.      She reports an increase in mid back pain with pain wrapping around her left rib cage.  She thinks this is worse and different about it.  She describes a \"hot sensation\" that is burning.  This can occur in between her shoulder blades as well and in her shoulders.      She is not having fevers.  She is still having hot flashes.      She reports edema in her feet and ankles; this goes all the way up to her thighs.      When the weather was better, she was trying to garden.  She has been trying to spend time with her family, though does not physically feel like she always can given her pain.    She does not feel depressed.  She is not working.      Her 10-point review of systems is otherwise negative.     MEDICATIONS:  reviewed     PHYSICAL EXAMINATION:    VITAL SIGNS:There were no vitals taken for this visit.   GENERAL: Healthy, alert and no distress  EYES: Eyes grossly normal to inspection.  No discharge or erythema, or obvious scleral/conjunctival abnormalities.  RESP: No audible wheeze, cough, or visible cyanosis.  No visible retractions or increased work of breathing.    SKIN: Visible skin clear. No significant rash, abnormal pigmentation or lesions.  NEURO: Cranial nerves grossly intact.  Mentation and speech appropriate for age.  PSYCH: Mentation appears normal, affect normal/bright, judgement and insight intact, normal speech and appearance well-groomed.     Labs:    Reviewed outside records and labs     ASSESSMENT AND PLAN:        Back pain - increased back pain in the setting of a history of multiple malignancies; I advised she obtain CT CAP.      DLBCL: dx Sept 2017 with thoracic paravertebral soft tissue mass and mediastinal nodes. overexpression of c-myc and high mitotic index (double-hit like). Staging LP and BM bx negative. S/p DA-R-EPOCH.  She is continuing " to see Dr Garima Sauceda for mgmt of her lymphoma.     Stage I, ER/OK-positive, HER2-negative breast cancer: dx in 2011. Oncotype recurrence score of 11 (7% recurrence risk after 5 years of tamoxifen). S/p bilateral mastectomies and BENJIE/BSO. For endocrine therapy she was on arimidex from 0651-6591, then changed to tamoxifen b/c of arthralgias on Arimidex.  She remained on this until mid 2019 at which time we stopped it concerned that she may have myalgias and muscle spasms from it.  GIven there is a proportion of individuals who can have muscle spasms while on tamoxifen, I advised she remain off of any tamoxifen or other endocrine therapy.      Given the above symptoms, I advised she obtain CT CAP      We discussed annual follow up with breast oncology through ten years, and then transitioning to PCP.      Papillary thyroid cancer, it is being followed by Dr. Avelina Castro.  She underwent etoh ablation x3.    She is running a slightly hyperthyroid.  She has follow up arranged with her.    Synchronous thyroid cancer and breast cancer.  She is brca 1 and 2 negative.  Additional testing is negative.        Muscle spasms and chronic pain - she is working with Dr Berry in pain/palliative care. Encouraged her to continue to follow up with this team for her complex needs.    HCM - all other health care maintenance and primary care through Dr Luna.    Vitamin D deficiency - we discussed increasing her vitamin D to 5000 international unit(s) daily.      LE edema - she complains of LE edema.  It is unclear to me the etiology of this edema.  She has had prior anthracyclines.  Last echo was normal EF.  She has normal liver function.  I reviewed her medications and do not see any potential cause of her LE edema.  Advised she follow up with her PCP.    Shahla Crawley MD

## 2021-11-22 NOTE — PROGRESS NOTES
Elvin is a 61 year old who is being evaluated via a billable video visit.      How would you like to obtain your AVS? Mail a copy  If the video visit is dropped, the invitation should be resent by: Text to cell phone: [phone]  Will anyone else be joining your video visit? No      Video Time:  15 min with additional 15 min for chart review and coordination of care.  Video-Visit Details    Type of service:  Video Visit    Originating Location (pt. Location): Home    Distant Location (provider location):  [facility]     Platform used for Video Visit: [employer]    REASON FOR VISIT:  F/u breast cancer and thyroid cancer and lymphoma     HISTORY OF PRESENT ILLNESS:  Tisha Arias is a 61 year-old woman, postmenopausal, with a history of T1c N0 M0, infiltrating ductal carcinoma of the right breast with a strong family history of breast cancer as well as thyroid cancer and a history of DLBCL. BRCA1 and 2 gene testing negative.    Cancer Treatment History for her breast cancer and DLBCL:  -She was diagnosed with breast cancer in 12/2011.    -She underwent bilateral mastectomy with immediate reconstruction on 01/30/2012 by Dr. Daugherty.  Her final pathology revealed 1.6-cm, infiltrating ductal carcinoma, grade 1/3, no angiolymphatic space invasion, no nipple or skin invasion.  There was associated DCIS, and the margins were free of tumor superiorly, anterior margin by 0.1 cm, and widely free at remaining margins.  This was ER/WV-positive, HER2-negative in the vast majority of cells and non-amplified with a ratio of 1.1.  She had an Oncotype test performed, which revealed a low-risk score of 11.  That put her overall risk of recurrence at about 7% after 5 years of tamoxifen.    - in 02/2012, she was started on Arimidex.    - 12/2012, she had prophylactic hysterectomy and oophorectomy, the pathology of which was benign.    - Her course has been complicated by extreme chest wall pain,  myofascial pain, and neuropathic pain.  She has gone through several different clinics and was put on several different medications, which were not doing her any good.  Eventually she was weaned off all the medications and is currently doing only PT with myofascial pain maneuvers with symptomatic improvement.   - 2/2014 switched from arimidex to tamoxifen  - presented to the ER on 9/1/17 for chest pain. CT-PA was negative for PE but showed right 3cm paraspinal mass and subcarinal adenopathy. PET on 9/6/17 showed the paraspinal mass to be hypermetabolic along with hilar and mediastinal nodes.   - biopsy of T10 vertebral body on 9/15/17 showed high-grade B cell lymphoma   - FNA EUS of subcarinal LN on 9/14 showed mostly necrotic tissue with suspicion for malignancy   - pt received DA-EPOCH under the care of Dr Garima Sauceda    Thyroid cancer history: Incidentally, given that she was having so many symptoms, she underwent a PET scan in 4/2013, which revealed a thyroid nodule which was biopsied and was consistent with papillary carcinoma.  She has undergone resection as well as radioiodine treatment since and has done relatively well from the surgery.  She follows up with Dr. Castro for the same.  She unfortunately required etoh ablation therapy over the thyroid lymph nodes.   She remains under the care of Dr Avelina Castro.       INTERVAL HISTORY: Elvin comes in today for follow up.  She is no longer on tamoxifen.      She continues to have intense muscle spasms across her chest; this is different than prior, with the back pain.       She sees Dr Berry in pain/palliative care for ongoing pain - neuropathy, chest wall pain.  She is on celebrex 200 twice daily, oxycodone 20 mg IR every four hours, and ketamine.  She takes ketamine 120 mg lozenges, 1/2 lozenge 5 times a day and 1 at bedtime.      She reports an increase in mid back pain with pain wrapping around her left rib cage.  She thinks this is worse  "and different about it.  She describes a \"hot sensation\" that is burning.  This can occur in between her shoulder blades as well and in her shoulders.      She is not having fevers.  She is still having hot flashes.      She reports edema in her feet and ankles; this goes all the way up to her thighs.      When the weather was better, she was trying to garden.  She has been trying to spend time with her family, though does not physically feel like she always can given her pain.    She does not feel depressed.  She is not working.      Her 10-point review of systems is otherwise negative.     MEDICATIONS:  reviewed     PHYSICAL EXAMINATION:    VITAL SIGNS:There were no vitals taken for this visit.   GENERAL: Healthy, alert and no distress  EYES: Eyes grossly normal to inspection.  No discharge or erythema, or obvious scleral/conjunctival abnormalities.  RESP: No audible wheeze, cough, or visible cyanosis.  No visible retractions or increased work of breathing.    SKIN: Visible skin clear. No significant rash, abnormal pigmentation or lesions.  NEURO: Cranial nerves grossly intact.  Mentation and speech appropriate for age.  PSYCH: Mentation appears normal, affect normal/bright, judgement and insight intact, normal speech and appearance well-groomed.     Labs:    Reviewed outside records and labs     ASSESSMENT AND PLAN:        Back pain - increased back pain in the setting of a history of multiple malignancies; I advised she obtain CT CAP.      DLBCL: dx Sept 2017 with thoracic paravertebral soft tissue mass and mediastinal nodes. overexpression of c-myc and high mitotic index (double-hit like). Staging LP and BM bx negative. S/p DA-R-EPOCH.  She is continuing to see Dr Garima Sauceda for mgmt of her lymphoma.     Stage I, ER/AL-positive, HER2-negative breast cancer: dx in 2011. Oncotype recurrence score of 11 (7% recurrence risk after 5 years of tamoxifen). S/p bilateral mastectomies and BENJIE/BSO. For endocrine " therapy she was on arimidex from 6858-6752, then changed to tamoxifen b/c of arthralgias on Arimidex.  She remained on this until mid 2019 at which time we stopped it concerned that she may have myalgias and muscle spasms from it.  GIven there is a proportion of individuals who can have muscle spasms while on tamoxifen, I advised she remain off of any tamoxifen or other endocrine therapy.      Given the above symptoms, I advised she obtain CT CAP      We discussed annual follow up with breast oncology through ten years, and then transitioning to PCP.      Papillary thyroid cancer, it is being followed by Dr. Avelina Castro.  She underwent etoh ablation x3.    She is running a slightly hyperthyroid.  She has follow up arranged with her.    Synchronous thyroid cancer and breast cancer.  She is brca 1 and 2 negative.  Additional testing is negative.        Muscle spasms and chronic pain - she is working with Dr Berry in pain/palliative care. Encouraged her to continue to follow up with this team for her complex needs.    HCM - all other health care maintenance and primary care through Dr Luna.    Vitamin D deficiency - we discussed increasing her vitamin D to 5000 international unit(s) daily.      LE edema - she complains of LE edema.  It is unclear to me the etiology of this edema.  She has had prior anthracyclines.  Last echo was normal EF.  She has normal liver function.  I reviewed her medications and do not see any potential cause of her LE edema.  Advised she follow up with her PCP.    Shahla Crawley MD

## 2021-11-23 NOTE — TELEPHONE ENCOUNTER
Levothyroxine Sodium Oral Tablet 112 MCG  Take 2 tablets (224 mcg) by mouth daily   Last Written Prescription Date:  11/11/20  Last Fill Quantity: 180,   # refills: 4  Last Office Visit : 11/12/20  Future Office visit:  Not scheduled> Erant sent to schedule appt with lab result review 11/5/21.  Scheduling has been notified to contact the pt for appointment.      Routing refill request to provider for review/approval because:  protocol   Erant sent to schedule appt with thyroid lab result review 11/5/21/ Dr Castro.  Scheduling has been notified to contact the pt for appointment.

## 2021-11-24 RX ORDER — LEVOTHYROXINE SODIUM 112 UG/1
224 TABLET ORAL DAILY
Qty: 180 TABLET | Refills: 4 | Status: SHIPPED | OUTPATIENT
Start: 2021-11-24 | End: 2022-10-13

## 2021-11-29 ENCOUNTER — PATIENT OUTREACH (OUTPATIENT)
Dept: ONCOLOGY | Facility: CLINIC | Age: 61
End: 2021-11-29
Payer: COMMERCIAL

## 2021-11-29 DIAGNOSIS — G89.4 CHRONIC PAIN SYNDROME: ICD-10-CM

## 2021-11-29 RX ORDER — OXYCODONE HYDROCHLORIDE 20 MG/1
20 TABLET ORAL EVERY 4 HOURS PRN
Qty: 84 TABLET | Refills: 0 | Status: SHIPPED | OUTPATIENT
Start: 2021-11-29 | End: 2021-12-09

## 2021-11-29 NOTE — TELEPHONE ENCOUNTER
Received call from patient requesting refill(s) of Oxycodone 20mg    Last dispensed from pharmacy on 11/15/21    Patient's last office/virtual visit by prescribing provider on 10/6/21  Next office/virtual appointment scheduled for 12/14/21    Last urine drug screen date 11/2020  Current opioid agreement on file (completed within the last year) No Date of opioid agreement: 8/2021    E-prescribe to [facility] pharmacy  Pending Prescriptions:                       Disp   Refills    oxyCODONE HCl (ROXICODONE) 20 MG TABS imm*84 tab*0            Sig: Take 20 mg by mouth every 4 hours as needed           (chronic pain) One tab every four hours for use           11/29-12/13

## 2021-11-29 NOTE — Clinical Note
Pal Orellana,  I sent you an email about this patient as well. I think she could benefit from a call from you in 1 week to see if she needs assistance exploring any of these resources. Very frustrated/disappointed with care that she has received at [facility].  -[place]

## 2021-11-29 NOTE — PROGRESS NOTES
Oncology Distress Screening Follow-up  Clinical Social Work  [facility]    Identified Concern and Score From Distress Screenin. How concerned are you about your ability to eat?  0         2. How concerned are you about unintended weight loss or your current weight?  8 Abnormal          3. How concerned are you about feeling depressed or very sad?  0         4. How concerned are you about feeling anxious or very scared?  0         5. Do you struggle with the loss of meaning and dontrell in your life?  Not at all         6. How concerned are you about work and home life issues that may be affected by your cancer?  --  Not comfortable answering question.         7. How concerned are you about knowing what resources are available to help you?  8 Abnormal          8. Do you currently have what you would describe as Quaker or spiritual struggles?             Not at all                   You can also ask to be contacted by one of our Oncology Supportive Care professionals.      9. If you want to be contacted by one of our professionals, I can send a message to them right now.  Oncology Dietitian           Date of Distress Screenin21    Intervention:   Elvin is a 61-year-old woman with a history of breast cancer, thyroid cancer, and lymphoma. At time of Elvin' last visit with Dr. Crawley, she scored positive on oncology distress screen. This clinician called Elvin today with goal of introducing psychosocial services and support and following up on elevated distress screen.     Elvin reports frustration, that she has been voicing concerns about finances for the past 10 years and has not had any follow-up. SW reviewed that there had been multiple social work outreach attempts in chart in past, where social workers either left voicemails or were unable to due to mailbox being full. Validated feelings of frustration with her feeling that concerns were not taken seriously by care team.     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 Elvin reports she is unable to work at present time due to pain. Elvin reports that she did not apply for SSDI or SSR as she didn't know how to access these resources. Elvin reports that  works at present time, family's income is $24,000/year. Elvin reports that she is behind on medical bills, and was told in past that she does not meet eligibility for [place].     Elvin not actively going through cancer treatment, and therefore not eligible for many of the cancer funding streams SW is aware of.     Education Provided:   Medicaid income eligibility  [facility]  Cancer Legal Care  SSDI Resource List  Onc SW contact information ([facility] [place])  [place]  [facility] Travel Assistance  [facility]     Follow-up Required:   1) SW applied for [employer] Travel Assistance [place] on pt's behalf today. ($200 Holiday Gift Cards)   2) Resources emailed per pt request  3) SW will continue to be available as needed for ongoing psychosocial support. This clinician will provide hand-off to Esteban Dickey.     JW Gallegos, LICSW  Phone: [phone]  [facility]: M, Thu  *every other Tue, 8am-4:30pm  [facility]: W, F, *every other Tue, 8am-4:30pm

## 2021-12-02 ENCOUNTER — DOCUMENTATION ONLY (OUTPATIENT)
Dept: ONCOLOGY | Facility: CLINIC | Age: 61
End: 2021-12-02
Payer: COMMERCIAL

## 2021-12-02 NOTE — PROGRESS NOTES
CLINICAL NUTRITION SERVICES     Reason for Contact: Patient was contacted by phone due to requested to speak with a Dietitian on the Oncology Distress Screening tool.    Action: RD called patient indicating reason for phone call. Left a VM with a return call back number.     Follow up: Wait for a return phone call.    Teresita Canchola RD, LD  [phone]

## 2021-12-09 DIAGNOSIS — R07.89 CHEST WALL PAIN: ICD-10-CM

## 2021-12-09 DIAGNOSIS — M79.18 MYOFASCIAL PAIN: ICD-10-CM

## 2021-12-09 DIAGNOSIS — G89.4 CHRONIC PAIN SYNDROME: ICD-10-CM

## 2021-12-09 RX ORDER — CYCLOBENZAPRINE HCL 10 MG
10 TABLET ORAL 3 TIMES DAILY PRN
Qty: 30 TABLET | Refills: 0 | Status: SHIPPED | OUTPATIENT
Start: 2021-12-09 | End: 2022-01-03

## 2021-12-09 RX ORDER — OXYCODONE HYDROCHLORIDE 20 MG/1
20 TABLET ORAL EVERY 4 HOURS PRN
Qty: 84 TABLET | Refills: 0 | Status: SHIPPED | OUTPATIENT
Start: 2021-12-13 | End: 2021-12-14

## 2021-12-09 NOTE — TELEPHONE ENCOUNTER
Received call from patient requesting refill(s) of Oxycodone, Mag-Ox and Flexeril    Last dispensed from pharmacy on   Oxycodone 11/29/21  Flexeril 11/20/21  Mag-Ox 10/30/21    Patient's last office/virtual visit by prescribing provider on 10/6/21  Next office/virtual appointment scheduled for 12/14/21    Last urine drug screen date 11/2020  Current opioid agreement on file (completed within the last year) No Date of opioid agreement: 10/2019    E-prescribe to [facility] pharmacy  Pending Prescriptions:                       Disp   Refills    oxyCODONE HCl (ROXICODONE) 20 MG TABS imm*84 tab*0            Sig: Take 20 mg by mouth every 4 hours as needed           (chronic pain) for use 12/13/21-12/27/21    magnesium oxide (MAG-OX) 400 (241.3 Mg) M*90 tab*3            Sig: Take 1 tablet (400 mg) by mouth 3 times daily           (with meals) Use dates: 12/9/21-4/8/22    cyclobenzaprine (FLEXERIL) 10 MG tablet   30 tab*0            Sig: Take 1 tablet (10 mg) by mouth 3 times daily as           needed for muscle spasms Use dates:           12/9/21-12/19/21

## 2021-12-13 ENCOUNTER — PATIENT OUTREACH (OUTPATIENT)
Dept: CARE COORDINATION | Facility: CLINIC | Age: 61
End: 2021-12-13
Payer: COMMERCIAL

## 2021-12-13 ENCOUNTER — TELEPHONE (OUTPATIENT)
Dept: PALLIATIVE MEDICINE | Facility: OTHER | Age: 61
End: 2021-12-13
Payer: COMMERCIAL

## 2021-12-13 NOTE — PROGRESS NOTES
Social Work Intervention  [facility]    Data/Intervention:    Patient Name:  Tisha Arias  /Age:  1960 (61 year old)    Visit Type: telephone  [facility]  Reason for Referral:  Check In    Collaborated With:    -Patient    Psychosocial Information/Concerns:  Elvin is a 61 year old patient with a diagnosis of Diffuse Large B-Cell Lymphoma. Shawnee connected with patient on 21 regarding Oncology Distress Screen. SW was asked to connect with Elvin again to check in and see if there are any additional supports needed.    Intervention/Education/Resources Provided:  SW attempted to reach patient, unable to do so. SW left a  and provided contact information.    Assessment/Plan:  SW will wait for patient to call back and remain available as needed. Provided patient/family with contact information and availability.    JW [place],[facility]  Hematology/Oncology Social Worker  Phone:[phone] Pager: [phone]

## 2021-12-14 ENCOUNTER — OFFICE VISIT (OUTPATIENT)
Dept: PALLIATIVE MEDICINE | Facility: OTHER | Age: 61
End: 2021-12-14
Payer: COMMERCIAL

## 2021-12-14 VITALS
WEIGHT: 209 LBS | DIASTOLIC BLOOD PRESSURE: 97 MMHG | HEART RATE: 84 BPM | BODY MASS INDEX: 34.82 KG/M2 | HEIGHT: 65 IN | SYSTOLIC BLOOD PRESSURE: 199 MMHG

## 2021-12-14 DIAGNOSIS — G89.4 CHRONIC PAIN SYNDROME: Primary | ICD-10-CM

## 2021-12-14 DIAGNOSIS — D47.02 MAST CELL DISEASE, SYSTEMIC: Chronic | ICD-10-CM

## 2021-12-14 PROCEDURE — G0463 HOSPITAL OUTPT CLINIC VISIT: HCPCS

## 2021-12-14 PROCEDURE — 80349 CANNABINOIDS NATURAL: CPT | Performed by: ANESTHESIOLOGY

## 2021-12-14 PROCEDURE — 80307 DRUG TEST PRSMV CHEM ANLYZR: CPT | Performed by: ANESTHESIOLOGY

## 2021-12-14 PROCEDURE — 80320 DRUG SCREEN QUANTALCOHOLS: CPT | Performed by: ANESTHESIOLOGY

## 2021-12-14 PROCEDURE — 99214 OFFICE O/P EST MOD 30 MIN: CPT | Performed by: ANESTHESIOLOGY

## 2021-12-14 RX ORDER — HYDROXYZINE HYDROCHLORIDE 25 MG/1
TABLET, FILM COATED ORAL
Qty: 90 TABLET | Refills: 3 | Status: SHIPPED | OUTPATIENT
Start: 2021-12-14 | End: 2022-01-14

## 2021-12-14 RX ORDER — ASCORBIC ACID 125 MG
1 TABLET,CHEWABLE ORAL 2 TIMES DAILY
Qty: 60 CAPSULE | Refills: 3 | Status: SHIPPED | OUTPATIENT
Start: 2021-12-14

## 2021-12-14 RX ORDER — ACETYLGLUCOSAMINE
600 POWDER (GRAM) MISCELLANEOUS
COMMUNITY
Start: 2021-05-05 | End: 2021-12-14

## 2021-12-14 RX ORDER — OXYCODONE HYDROCHLORIDE 30 MG/1
30 TABLET ORAL EVERY 4 HOURS PRN
Qty: 84 TABLET | Refills: 0 | Status: SHIPPED | OUTPATIENT
Start: 2021-12-21 | End: 2021-12-31

## 2021-12-14 ASSESSMENT — PAIN SCALES - GENERAL: PAINLEVEL: EXTREME PAIN (8)

## 2021-12-14 ASSESSMENT — MIFFLIN-ST. JEOR: SCORE: 1513.9

## 2021-12-14 NOTE — PROGRESS NOTES
PEG Score 12/14/2021   PEG Total Score 9          [facility] Number:  [phone]    Call with any questions about your care and for scheduling assistance.     Calls are returned Monday through Friday between 8 AM and 4:30 PM. We usually get back to you within 2 business days depending on the issue/request.    If we are prescribing your medications:    For opioid medication refills, call the clinic or send a [employer] message 7 days in advance.  Please include:    Name of requested medication    Name of the pharmacy.    For non-opioid medications, call your pharmacy directly to request a refill. Please allow 3-4 days to be processed.     Per [place] State Law:    All controlled substance prescriptions must be filled within 30 days of being written.      For those controlled substances allowing refills, pickup must occur within 30 days of last fill.      We believe regular attendance is key to your success in our program!      Any time you are unable to keep your appointment we ask that you call us at least 24 hours in advance to cancel.This will allow us to offer the appointment time to another patient.     Multiple missed appointments may lead to dismissal from the clinic.

## 2021-12-14 NOTE — PROGRESS NOTES
12/14/21 1517   PEG: A Thee-Item Scale Assessing Pain Intensity and Interference        0 = No pain / No interference    10 = Pain as bad as you can imagine / Completely interferes   What number best describes your pain on average in the past week? 9   What number best describes how, during the past week, pain has interfered with your enjoyment of life? 9   What number best describes how, during the past week, pain has interfered with your general activity? 9   PEG Total Score 9

## 2021-12-14 NOTE — LETTER
Opioid / Opioid Plus Controlled Substance Agreement    This is an agreement between you and your provider about the safe and appropriate use of controlled substance/opioids prescribed by your care team. Controlled substances are medicines that can cause physical and mental dependence (abuse).    There are strict laws about having and using these medicines. We here at [facility] are committing to working with you in your efforts to get better. To support you in this work, we ll help you schedule regular office appointments for medicine refills. If we must cancel or change your appointment for any reason, we ll make sure you have enough medicine to last until your next appointment.     As a Provider, I will:    Listen carefully to your concerns and treat you with respect.     Recommend a treatment plan that I believe is in your best interest. This plan may involve therapies other than opioid pain medication.     Talk with you often about the possible benefits, and the risk of harm of any medicine that we prescribe for you.     Provide a plan on how to taper (discontinue or go off) using this medicine if the decision is made to stop its use.    As a Patient, I understand that opioid(s):     Are a controlled substance prescribed by my care team to help me function or work and manage my condition(s).     Are strong medicines and can cause serious side effects such as:    Drowsiness, which can seriously affect my driving ability    A lower breathing rate, enough to cause death    Harm to my thinking ability     Depression     Abuse of and addiction to this medicine    Need to be taken exactly as prescribed. Combining opioids with certain medicines or chemicals (such as illegal drugs, sedatives, sleeping pills, and benzodiazepines) can be dangerous or even fatal. If I stop opioids suddenly, I may have severe withdrawal symptoms.    Do not work for all types of pain nor for all patients. If they re not helpful, I may  be asked to stop them.        The risks, benefits and side effects of these medicine(s) were explained to me. I agree that:  1. I will take part in other treatments as advised by my care team. This may be psychiatry or counseling, physical therapy, behavioral therapy, group treatment or a referral to a specialist.     2. I will keep all my appointments. I understand that this is part of the monitoring of opioids. My care team may require an office visit for EVERY opioid/controlled substance refill. If I miss appointments or don t follow instructions, my care team may stop my medicine.    3. I will take my medicines as prescribed. I will not change the dose or schedule unless my care team tells me to. There will be no refills if I run out early.     4. I may be asked to come to the clinic and complete a urine drug test or complete a pill count at any time. If I don t give a urine sample or participate in a pill count, the care team may stop my medicine.    5. I will only receive prescriptions from this clinic for chronic pain. If I am treated by another provider for acute pain issues, I will tell them that I am taking opioid pain medication for chronic pain and that I have a treatment agreement with this provider. I will inform my [facility] care team within one business day if I am given a prescription for any pain medication by another healthcare provider. My [facility] care team can contact other providers and pharmacists about my use of any medicines.    6. It is up to me to make sure that I don t run out of my medicines on weekends or holidays. If my care team is willing to refill my opioid prescription without a visit, I must request refills only during office hours. Refills may take up to 3 business days to process. I will use one pharmacy to fill all my opioid and other controlled substance prescriptions. I will notify the clinic about any changes to my insurance or medication  availability.    7. I am responsible for my prescriptions. If the medicine/prescription is lost, stolen or destroyed, it will not be replaced. I also agree not to share controlled substance medicines with anyone.    8. I am aware I should not use any illegal or recreational drugs. I agree not to drink alcohol unless my care team says I can.       9. If I enroll in the [place] Medical Cannabis program, I will tell my care team prior to my next refill.     10. I will tell my care team right away if I become pregnant, have a new medical problem treated outside of my regular clinic, or have a change in my medications.    11. I understand that this medicine can affect my thinking, judgment and reaction time. Alcohol and drugs affect the brain and body, which can affect the safety of my driving. Being under the influence of alcohol or drugs can affect my decision-making, behaviors, personal safety, and the safety of others. Driving while impaired (DWI) can occur if a person is driving, operating, or in physical control of a car, motorcycle, boat, snowmobile, ATV, motorbike, off-road vehicle, or any other motor vehicle ([place] Statute 169[place].20). I understand the risk if I choose to drive or operate any vehicle or machinery.    I understand that if I do not follow any of the conditions above, my prescriptions or treatment may be stopped or changed.          Opioids  What You Need to Know    What are opioids?   Opioids are pain medicines that must be prescribed by a doctor. They are also known as narcotics.     Examples are:   1. morphine (MS Contin, Zayda)  2. oxycodone (Oxycontin)  3. oxycodone and acetaminophen (Percocet)  4. hydrocodone and acetaminophen (Vicodin, Norco)   5. fentanyl patch (Duragesic)   6. hydromorphone (Dilaudid)   7. methadone  8. codeine (Tylenol #3)     What do opioids do well?   Opioids are best for severe short-term pain such as after a surgery or injury. They may work well for cancer pain. They may  help some people with long-lasting (chronic) pain.     What do opioids NOT do well?   Opioids never get rid of pain entirely, and they don t work well for most patients with chronic pain. Opioids don t reduce swelling, one of the causes of pain.                                    Other ways to manage chronic pain and improve function include:       Treat the health problem that may be causing pain    Anti-inflammation medicines, which reduce swelling and tenderness, such as ibuprofen (Advil, Motrin) or naproxen (Aleve)    Acetaminophen (Tylenol)    Antidepressants and anti-seizure medicines, especially for nerve pain    Topical treatments such as patches or creams    Injections or nerve blocks    Chiropractic or osteopathic treatment    Acupuncture, massage, deep breathing, meditation, visual imagery, aromatherapy    Use heat or ice at the pain site    Physical therapy     Exercise    Stop smoking    Take part in therapy       Risks and side effects     Talk to your doctor before you start or decide to keep taking opioids. Possible side effects include:      Lowering your breathing rate enough to cause death    Overdose, including death, especially if taking higher than prescribed doses    Worse depression symptoms; less pleasure in things you usually enjoy    Feeling tired or sluggish    Slower thoughts or cloudy thinking    Being more sensitive to pain over time; pain is harder to control    Trouble sleeping or restless sleep    Changes in hormone levels (for example, less testosterone)    Changes in sex drive or ability to have sex    Constipation    Unsafe driving    Itching and sweating    Dizziness    Nausea, throwing up and dry mouth    What else should I know about opioids?    Opioids may lead to dependence, tolerance, or addiction.      Dependence means that if you stop or reduce the medicine too quickly, you will have withdrawal symptoms. These include loose poop (diarrhea), jitters, flu-like symptoms,  nervousness and tremors. Dependence is not the same as addiction.                       Tolerance means needing higher doses over time to get the same effect. This may increase the chance of serious side effects.      Addiction is when people improperly use a substance that harms their body, their mind or their relations with others. Use of opiates can cause a relapse of addiction if you have a history of drug or alcohol abuse.      People who have used opioids for a long time may have a lower quality of life, worse depression, higher levels of pain and more visits to doctors.    You can overdose on opioids. Take these steps to lower your risk of overdose:    1. Recognize the signs:  Signs of overdose include decrease or loss of consciousness (blackout), slowed breathing, trouble waking up and blue lips. If someone is worried about overdose, they should call 911.    2. Talk to your doctor about Narcan (naloxone).   If you are at risk for overdose, you may be given a prescription for Narcan. This medicine very quickly reverses the effects of opioids.   If you overdose, a friend or family member can give you Narcan while waiting for the ambulance. They need to know the signs of overdose and how to give Narcan.     3. Don't use alcohol or street drugs.   Taking them with opioids can cause death.    4. Do not take any of these medicines unless your doctor says it s OK. Taking these with opioids can cause death:    Benzodiazepines, such as lorazepam (Ativan), alprazolam (Xanax) or diazepam (Valium)    Muscle relaxers, such as cyclobenzaprine (Flexeril)    Sleeping pills like zolpidem (Ambien)     Other opioids      How to keep you and other people safe while taking opioids:    1. Never share your opioids with others.  Opioid medicines are regulated by the Drug Enforcement Agency ([employer]). Selling or sharing medications is a criminal act.    2. Be sure to store opioids in a secure place, locked up if possible. Young children  can easily swallow them and overdose.    3. When you are traveling with your medicines, keep them in the original bottles. If you use a pill box, be sure you also carry a copy of your medicine list from your clinic or pharmacy.    4. Safe disposal of opioids    Most pharmacies have places to get rid of medicine, called disposal kiosks. Medicine disposal options are also available in every [facility]. Search your county and  medication disposal  to find more options. You can find more details at:  [email or web address]     I agree that my provider, clinic care team, and pharmacy may work with any city, state or federal law enforcement agency that investigates the misuse, sale, or other diversion of my controlled medicine. I will allow my provider to discuss my care with, or share a copy of, this agreement with any other treating provider, pharmacy or emergency room where I receive care.    I have read this agreement and have asked questions about anything I did not understand.    _______________________________________________________  Patient Signature - Tisha Arias _____________________                   Date     _______________________________________________________  Provider Signature - ADRI DIXON MD   _____________________                   Date     _______________________________________________________  Witness Signature (required if provider not present while patient signing)   _____________________                   Date

## 2021-12-14 NOTE — PROGRESS NOTES
"      Subjective   Elvin is a 61 year old who presents for the following health issues     Follow-up for pain since chemotherapy and radiation.    She is accompanied today by her friend named Noe who was a therapist and that became friend, with her course for many years, [phone].    She reviews having significant increase in pain which she tends during the winter.  Also some increase in muscle spasms, taking calcium and magnesium supplementation.  Flexeril seems to be okay.  She found methocarbamol made things worse.  Has used tizanidine and baclofen.    Did have a trial of the oxymorphone.  She described within 15 minutes started throwing up.  Very disappointed is also expensive.  Has gone back to the oxycodone 20 mg every 4 hours.  Notes insurance does not cover for some reason.    Is taking Celebrex with perhaps some benefit.    Using ketamine 120 mg lozenge up to 3 4 Solage and just at bedtime perhaps with some benefit.  Again insurance does not cover.  Would not tolerate during the day.    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    Did see her hematologist, is to get a repeat CT.  They are reviewing lymphedema.  Also reviewing cardiac concerns as some of the chemotherapy was cardiotoxic.    She has been involved with her dentist, has lost several teeth due to dry mouth of medications.    Her friend here to advocate for her notes that she is really struggling with pain.  Has seen her over the years and there were times with better control.  During at time was using was using acupuncture and electromagnetic frequencies to some extent.    I reviewed see the patient in transition by the pain clinics.  There have been concerns about opioid-induced hyperalgesia and adjusting of high doses.    Elvin reviews the time she did best was on oxycodone 30 mg every 4 hours.  This was in 2017-18, then they will \"opioid crisis was a " problem.    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  That came on during the time of chemotherapy.  It was present before she started oxycodone.  Pain was much better when she was on that oxycodone dose.  Her friend present concurs that that was a time where she looked much better in terms of pain: Function.     reviewed, as expected, last urine drug test in November 2020      Current Outpatient Medications:      ACAI RAMIREZ PO, Take 50 mg by mouth, Disp: , Rfl:      acetaminophen (TYLENOL) 325 MG tablet, Take 2 tablets (650 mg) by mouth every 4 hours as needed for mild pain or fever, Disp: 100 tablet, Rfl:      albuterol (VENTOLIN HFA) 108 (90 Base) MCG/ACT inhaler, Inhale 1-2 puffs into the lungs every 4 hours as needed for shortness of breath / dyspnea or wheezing, Disp: 18 g, Rfl: 11     calcitRIOL (ROCALTROL) 0.25 MCG capsule, take 2 capsules (0.5 mcg) by mouth in the AM and 1 capsule (0.25 mcg) in the PM. Virtual/video visit requested., Disp: 270 capsule, Rfl: 4     calcium citrate-vitamin D (CALCIUM CITRATE + D3) 315-250 MG-UNIT TABS per tablet, Take 2 tablets by mouth 2 times daily, Disp: 60 tablet, Rfl: 2     celecoxib (CELEBREX) 200 MG capsule, Take 1 capsule (200 mg) by mouth TWO times daily , Disp: 60 capsule, Rfl: 0     cetirizine (ZYRTEC ALLERGY) 10 MG tablet, Take 1 tablet (10 mg) by mouth 3 times daily On hold for lab test., Disp: 90 tablet, Rfl: 0     Cholecalciferol (VITAMIN D3) 50 MCG (2000 UT) CAPS, Take 2,000 Units by mouth daily, Disp: 90 capsule, Rfl: 1     cromolyn (OPTICROM) 4 % ophthalmic solution, Place 1 drop into both eyes 4 times daily, Disp: 10 mL, Rfl: 0     cromolyn sodium (NASALCROM) 5.2 MG/ACT nasal aerosol, SPRAY ONE SPRAY( 1 ML) IN NOSTRIL DAILY, Disp: 1 mL, Rfl: 3     cyclobenzaprine (FLEXERIL) 10 MG tablet, Take 1 tablet (10 mg) by mouth 3 times daily as needed for muscle spasms Use dates: 12/9/21-12/19/21, Disp: 30 tablet, Rfl: 0     diclofenac (VOLTAREN) 1  % topical gel, Apply affected area two times daily as needed using enclosed dosing card. (2-4 grams to chest wall), Disp: 100 g, Rfl: 1     famotidine (PEPCID) 20 MG tablet, Take 20 mg by mouth 2 times daily, Disp: , Rfl:      fluticasone (FLONASE) 50 MCG/ACT nasal spray, Spray 1-2 sprays into both nostrils daily, Disp: 16 g, Rfl: 2     furosemide (LASIX) 20 MG tablet, Take 1 tablet (20 mg) by mouth 2 times daily, Disp: 90 tablet, Rfl: 3     hydrOXYzine (ATARAX) 25 MG tablet, One to two tabs every six hours for itching, Disp: 90 tablet, Rfl: 3     ketamine HCl POWD, Ketamine 10% and lidocaine 10% topical cream, apply to feet three times a day, 30 mg tube, Disp: 300 g, Rfl: 1     KLOR-CON 20 MEQ CR tablet, TAKE ONE TABLET BY MOUTH TWICE DAILY, Disp: 60 tablet, Rfl: 0     levothyroxine (SYNTHROID/LEVOTHROID) 112 MCG tablet, TAKE 2 TABLETS (224 MCG) BY MOUTH DAILY, Disp: 180 tablet, Rfl: 4     lidocaine (XYLOCAINE) 5 % external ointment, Apply quarter size amount to chest and back up to 3 times daily as needed for pain., Disp: 350 g, Rfl: 1     lidocaine-prilocaine (EMLA) cream, Apply topically as needed for moderate pain, Disp: 60 g, Rfl: 1     magnesium oxide (MAG-OX) 400 (241.3 Mg) MG tablet, Take 1 tablet (400 mg) by mouth 3 times daily (with meals) Use dates: 12/9/21-4/8/22, Disp: 90 tablet, Rfl: 3     magnesium oxide (MAG-OX) 400 MG tablet, Take 400 mg by mouth 3 times daily, Disp: , Rfl:      Menaquinone-7 (VITAMIN K2) 100 MCG CAPS, Take 1 capsule by mouth 2 times daily, Disp: 60 capsule, Rfl: 3     montelukast (SINGULAIR) 10 MG tablet, TAKE TWO TABLETS BY MOUTH TWICE DAILY , Disp: 120 tablet, Rfl: 0     naloxone (NARCAN) nasal spray, Spray 1 spray (4 mg) into one nostril alternating nostrils as needed for opioid reversal every 2-3 minutes until assistance arrives, Disp: 0.2 mL, Rfl: 0     olopatadine (PATANOL) 0.1 % ophthalmic solution, Apply 1 drop to eye, Disp: , Rfl:      omeprazole (PRILOSEC) 10 MG DR  "capsule, Take 2 capsules (20 mg) by mouth daily, Disp: 30 capsule, Rfl: 1     ondansetron (ZOFRAN-ODT) 8 MG ODT tab, Take 1 tablet (8 mg) by mouth every 8 hours as needed for nausea, Disp: 30 tablet, Rfl: 4     [START ON 12/21/2021] oxyCODONE IR (ROXICODONE) 30 MG tablet, Take 1 tablet (30 mg) by mouth every 4 hours as needed for severe pain, Disp: 84 tablet, Rfl: 0     Probiotic Product (PROBIOTIC DAILY PO), Take 1 capsule by mouth daily Lacto acid bifidobacterium, Disp: , Rfl:      ranitidine (ZANTAC) 75 MG tablet, Take 1 tablet (75 mg) by mouth 3 times daily, Disp: 90 tablet, Rfl: 3     SUMAtriptan (IMITREX) 50 MG tablet, Take 1 tablet by mouth as needed for migraine . May repeat dose in 2 hours as needed. Maximum dose 4 tablets in 24 hours, Disp: 9 tablet, Rfl: 11     tobramycin (TOBREX) 0.3 % ophthalmic solution, ADMINISTER 1 2 DROPS TO THE RIGHT EYE EVERY 4 (FOUR) HOURS FOR 7 DAYS., Disp: , Rfl:      tobramycin-dexamethasone (TOBRADEX) 0.3-0.1 % ophthalmic suspension, INSTILL ONE DROP INTO BOTH EYES THREE TIMES DAILY, Disp: , Rfl:      triamcinolone (KENALOG) 0.025 % ointment, Apply topically as needed, Disp: , Rfl: 3     Triamcinolone Acetonide (AZMACORT IN), Inhale 2 puffs into the lungs as needed, Disp: , Rfl:      UNABLE TO FIND, Take 2 capsules by mouth 3 times daily Muscle Mag. 2 caps contain B1 20mg, B2 20mg, B6 10mg, magesium 20mg, manganese 2mg., Disp: , Rfl:      UNABLE TO FIND, 3 tablets 3 times daily MEDICATION NAME: calcium D-Glucarate  3 caps contain 180mg of elemental calcium., Disp: , Rfl:      UNABLE TO FIND, 1 tablet daily MEDICATION NAME: Pure Encapsulations, Disp: , Rfl:     She is alert with a clear sensorium.  The rest [employer] uniform.  Affect is constricted, near tears at times.  Ambulates slowly.  No respiratory distress, reviews deep \"bone pain\" as biggest concern  HPI         Review of Systems         Objective    BP (!) 199/97   Pulse 84   Ht 1.651 m (5' 5\")   Wt 94.8 kg " (209 lb)   BMI 34.78 kg/m    Body mass index is 34.78 kg/m .  Physical Exam     Assessment: Diffuse pain after chemotherapy with variety of agents.    Has had a variety of regimens over the years.  By her report has consistently doing better on a relatively high dose of oxycodone.  Her friend who is been with her for several years concurs.    Plan: Will increase the oxycodone to 30 mg 4 times a day and observe.  Friend to come to next appointment to get feedback.    We will increase vitamin K 2 to twice a day.    Continue with hydroxyzine which she feels has had some synergy.    Total time more than 30 minutes

## 2021-12-14 NOTE — PATIENT INSTRUCTIONS
PLAN:  Increase your oxycodone 20 mg tablets to 1-1/2 tablet every 4 hours.  Will refill with oxycodone 30 mg every 4 hours.  Call with update in 2 weeks with response.    Increase vitamin K to 100 mg to twice a day.    Resume hydroxyzine 25 mg tablets 1 to 2 tablets 4 times a day as needed.    Follow-up with Dr. Berry the office in 2 months

## 2021-12-14 NOTE — LETTER
12/14/2021         RE: Tisha Arias  [address] [place] [address]        Dear Colleague,    Thank you for referring your patient, Tisha Arias, to the [facility]. Please see a copy of my visit note below.      PEG Score 12/14/2021   PEG Total Score 9          [facility]    Clinic Number:  [phone]    Call with any questions about your care and for scheduling assistance.     Calls are returned Monday through Friday between 8 AM and 4:30 PM. We usually get back to you within 2 business days depending on the issue/request.    If we are prescribing your medications:    For opioid medication refills, call the clinic or send a Hypios message 7 days in advance.  Please include:    Name of requested medication    Name of the pharmacy.    For non-opioid medications, call your pharmacy directly to request a refill. Please allow 3-4 days to be processed.     Per [place] State Law:    All controlled substance prescriptions must be filled within 30 days of being written.      For those controlled substances allowing refills, pickup must occur within 30 days of last fill.      We believe regular attendance is key to your success in our program!      Any time you are unable to keep your appointment we ask that you call us at least 24 hours in advance to cancel.This will allow us to offer the appointment time to another patient.     Multiple missed appointments may lead to dismissal from the clinic.            Subjective   Elvin is a 61 year old who presents for the following health issues     Follow-up for pain since chemotherapy and radiation.    She is accompanied today by her friend named Noe who was a therapist and that became friend, with her course for many years, [phone].    She reviews having significant increase in pain which she tends during the winter.  Also some increase in muscle spasms, taking calcium and magnesium  "supplementation.  Flexeril seems to be okay.  She found methocarbamol made things worse.  Has used tizanidine and baclofen.    Did have a trial of the oxymorphone.  She described within 15 minutes started throwing up.  Very disappointed is also expensive.  Has gone back to the oxycodone 20 mg every 4 hours.  Notes insurance does not cover for some reason.    Is taking Celebrex with perhaps some benefit.    Using ketamine 120 mg lozenge up to 3 4 Solage and just at bedtime perhaps with some benefit.  Again insurance does not cover.  Would not tolerate during the day.    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    Did see her hematologist, is to get a repeat CT.  They are reviewing lymphedema.  Also reviewing cardiac concerns as some of the chemotherapy was cardiotoxic.    She has been involved with her dentist, has lost several teeth due to dry mouth of medications.    Her friend here to advocate for her notes that she is really struggling with pain.  Has seen her over the years and there were times with better control.  During at time was using was using acupuncture and electromagnetic frequencies to some extent.    I reviewed see the patient in transition by the pain clinics.  There have been concerns about opioid-induced hyperalgesia and adjusting of high doses.    Elvin reviews the time she did best was on oxycodone 30 mg every 4 hours.  This was in 2017-18, then they will \"opioid crisis was a problem.    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  That came on during the time of chemotherapy.  It was present before she started oxycodone.  Pain was much better when she was on that oxycodone dose.  Her friend present concurs that that was a time where she looked much better in terms of pain: Function.     reviewed, as expected, last urine drug test in November 2020      Current Outpatient Medications:      ACAI BERRY " PO, Take 50 mg by mouth, Disp: , Rfl:      acetaminophen (TYLENOL) 325 MG tablet, Take 2 tablets (650 mg) by mouth every 4 hours as needed for mild pain or fever, Disp: 100 tablet, Rfl:      albuterol (VENTOLIN HFA) 108 (90 Base) MCG/ACT inhaler, Inhale 1-2 puffs into the lungs every 4 hours as needed for shortness of breath / dyspnea or wheezing, Disp: 18 g, Rfl: 11     calcitRIOL (ROCALTROL) 0.25 MCG capsule, take 2 capsules (0.5 mcg) by mouth in the AM and 1 capsule (0.25 mcg) in the PM. Virtual/video visit requested., Disp: 270 capsule, Rfl: 4     calcium citrate-vitamin D (CALCIUM CITRATE + D3) 315-250 MG-UNIT TABS per tablet, Take 2 tablets by mouth 2 times daily, Disp: 60 tablet, Rfl: 2     celecoxib (CELEBREX) 200 MG capsule, Take 1 capsule (200 mg) by mouth TWO times daily , Disp: 60 capsule, Rfl: 0     cetirizine (ZYRTEC ALLERGY) 10 MG tablet, Take 1 tablet (10 mg) by mouth 3 times daily On hold for lab test., Disp: 90 tablet, Rfl: 0     Cholecalciferol (VITAMIN D3) 50 MCG (2000 UT) CAPS, Take 2,000 Units by mouth daily, Disp: 90 capsule, Rfl: 1     cromolyn (OPTICROM) 4 % ophthalmic solution, Place 1 drop into both eyes 4 times daily, Disp: 10 mL, Rfl: 0     cromolyn sodium (NASALCROM) 5.2 MG/ACT nasal aerosol, SPRAY ONE SPRAY( 1 ML) IN NOSTRIL DAILY, Disp: 1 mL, Rfl: 3     cyclobenzaprine (FLEXERIL) 10 MG tablet, Take 1 tablet (10 mg) by mouth 3 times daily as needed for muscle spasms Use dates: 12/9/21-12/19/21, Disp: 30 tablet, Rfl: 0     diclofenac (VOLTAREN) 1 % topical gel, Apply affected area two times daily as needed using enclosed dosing card. (2-4 grams to chest wall), Disp: 100 g, Rfl: 1     famotidine (PEPCID) 20 MG tablet, Take 20 mg by mouth 2 times daily, Disp: , Rfl:      fluticasone (FLONASE) 50 MCG/ACT nasal spray, Spray 1-2 sprays into both nostrils daily, Disp: 16 g, Rfl: 2     furosemide (LASIX) 20 MG tablet, Take 1 tablet (20 mg) by mouth 2 times daily, Disp: 90 tablet, Rfl: 3      hydrOXYzine (ATARAX) 25 MG tablet, One to two tabs every six hours for itching, Disp: 90 tablet, Rfl: 3     ketamine HCl POWD, Ketamine 10% and lidocaine 10% topical cream, apply to feet three times a day, 30 mg tube, Disp: 300 g, Rfl: 1     KLOR-CON 20 MEQ CR tablet, TAKE ONE TABLET BY MOUTH TWICE DAILY, Disp: 60 tablet, Rfl: 0     levothyroxine (SYNTHROID/LEVOTHROID) 112 MCG tablet, TAKE 2 TABLETS (224 MCG) BY MOUTH DAILY, Disp: 180 tablet, Rfl: 4     lidocaine (XYLOCAINE) 5 % external ointment, Apply quarter size amount to chest and back up to 3 times daily as needed for pain., Disp: 350 g, Rfl: 1     lidocaine-prilocaine (EMLA) cream, Apply topically as needed for moderate pain, Disp: 60 g, Rfl: 1     magnesium oxide (MAG-OX) 400 (241.3 Mg) MG tablet, Take 1 tablet (400 mg) by mouth 3 times daily (with meals) Use dates: 12/9/21-4/8/22, Disp: 90 tablet, Rfl: 3     magnesium oxide (MAG-OX) 400 MG tablet, Take 400 mg by mouth 3 times daily, Disp: , Rfl:      Menaquinone-7 (VITAMIN K2) 100 MCG CAPS, Take 1 capsule by mouth 2 times daily, Disp: 60 capsule, Rfl: 3     montelukast (SINGULAIR) 10 MG tablet, TAKE TWO TABLETS BY MOUTH TWICE DAILY , Disp: 120 tablet, Rfl: 0     naloxone (NARCAN) nasal spray, Spray 1 spray (4 mg) into one nostril alternating nostrils as needed for opioid reversal every 2-3 minutes until assistance arrives, Disp: 0.2 mL, Rfl: 0     olopatadine (PATANOL) 0.1 % ophthalmic solution, Apply 1 drop to eye, Disp: , Rfl:      omeprazole (PRILOSEC) 10 MG DR capsule, Take 2 capsules (20 mg) by mouth daily, Disp: 30 capsule, Rfl: 1     ondansetron (ZOFRAN-ODT) 8 MG ODT tab, Take 1 tablet (8 mg) by mouth every 8 hours as needed for nausea, Disp: 30 tablet, Rfl: 4     [START ON 12/21/2021] oxyCODONE IR (ROXICODONE) 30 MG tablet, Take 1 tablet (30 mg) by mouth every 4 hours as needed for severe pain, Disp: 84 tablet, Rfl: 0     Probiotic Product (PROBIOTIC DAILY PO), Take 1 capsule by mouth daily Lacto  "acid bifidobacterium, Disp: , Rfl:      ranitidine (ZANTAC) 75 MG tablet, Take 1 tablet (75 mg) by mouth 3 times daily, Disp: 90 tablet, Rfl: 3     SUMAtriptan (IMITREX) 50 MG tablet, Take 1 tablet by mouth as needed for migraine . May repeat dose in 2 hours as needed. Maximum dose 4 tablets in 24 hours, Disp: 9 tablet, Rfl: 11     tobramycin (TOBREX) 0.3 % ophthalmic solution, ADMINISTER 1 2 DROPS TO THE RIGHT EYE EVERY 4 (FOUR) HOURS FOR 7 DAYS., Disp: , Rfl:      tobramycin-dexamethasone (TOBRADEX) 0.3-0.1 % ophthalmic suspension, INSTILL ONE DROP INTO BOTH EYES THREE TIMES DAILY, Disp: , Rfl:      triamcinolone (KENALOG) 0.025 % ointment, Apply topically as needed, Disp: , Rfl: 3     Triamcinolone Acetonide (AZMACORT IN), Inhale 2 puffs into the lungs as needed, Disp: , Rfl:      UNABLE TO FIND, Take 2 capsules by mouth 3 times daily Muscle Mag. 2 caps contain B1 20mg, B2 20mg, B6 10mg, magesium 20mg, manganese 2mg., Disp: , Rfl:      UNABLE TO FIND, 3 tablets 3 times daily MEDICATION NAME: calcium D-Glucarate  3 caps contain 180mg of elemental calcium., Disp: , Rfl:      UNABLE TO FIND, 1 tablet daily MEDICATION NAME: Pure Encapsulations, Disp: , Rfl:     She is alert with a clear sensorium.  The rest [employer] uniform.  Affect is constricted, near tears at times.  Ambulates slowly.  No respiratory distress, reviews deep \"bone pain\" as biggest concern  HPI         Review of Systems         Objective    BP (!) 199/97   Pulse 84   Ht 1.651 m (5' 5\")   Wt 94.8 kg (209 lb)   BMI 34.78 kg/m    Body mass index is 34.78 kg/m .  Physical Exam     Assessment: Diffuse pain after chemotherapy with variety of agents.    Has had a variety of regimens over the years.  By her report has consistently doing better on a relatively high dose of oxycodone.  Her friend who is been with her for several years concurs.    Plan: Will increase the oxycodone to 30 mg 4 times a day and observe.  Friend to come to next " appointment to get feedback.    We will increase vitamin K 2 to twice a day.    Continue with hydroxyzine which she feels has had some synergy.    Total time more than 30 minutes                 12/14/21 7888   PEG: A Thee-Item Scale Assessing Pain Intensity and Interference        0 = No pain / No interference    10 = Pain as bad as you can imagine / Completely interferes   What number best describes your pain on average in the past week? 9   What number best describes how, during the past week, pain has interfered with your enjoyment of life? 9   What number best describes how, during the past week, pain has interfered with your general activity? 9   PEG Total Score 9       Again, thank you for allowing me to participate in the care of your patient.        Sincerely,        ADRI DIXON MD

## 2021-12-15 DIAGNOSIS — R07.89 CHEST WALL PAIN: ICD-10-CM

## 2021-12-15 LAB
BARBITURATES UR QL: NORMAL
CANNABINOIDS UR QL SCN: NORMAL
CREAT UR-MCNC: 23 MG/DL
ETHANOL UR QL SCN: NORMAL

## 2021-12-17 LAB
KETAMINE UR QL CFM: PRESENT
OXYCODONE UR CFM-MCNC: 920 NG/ML
OXYCODONE/CREAT UR: 4000 NG/MG {CREAT}
OXYMORPHONE UR CFM-MCNC: 1820 NG/ML
OXYMORPHONE/CREAT UR: 7913 NG/MG {CREAT}

## 2021-12-17 NOTE — TELEPHONE ENCOUNTER
"Routing refill request to provider for review/approval because:  Labs out of range:  BP, ALT, AST    Requested Prescriptions   Pending Prescriptions Disp Refills     celecoxib (CELEBREX) 200 MG capsule [Pharmacy Med Name: Celecoxib Oral Capsule 200 MG] 60 capsule 0     Sig: Take 1 capsule (200 mg) by mouth TWO times daily       NSAID Medications Failed - 12/15/2021  8:50 PM        Failed - Blood pressure under 140/90 in past 12 months     BP Readings from Last 3 Encounters:   12/14/21 (!) 199/97   11/11/20 (!) 159/84   03/09/20 128/82                 Failed - Normal ALT on file in past 12 months     Recent Labs   Lab Test 10/18/21  1128   ALT 51*             Failed - Normal AST on file in past 12 months     Recent Labs   Lab Test 10/18/21  1128   AST 66*             Passed - Recent (12 mo) or future (30 days) visit within the authorizing provider's specialty     Patient has had an office visit with the authorizing provider or a provider within the authorizing providers department within the previous 12 mos or has a future within next 30 days. See \"Patient Info\" tab in inbasket, or \"Choose Columns\" in Meds & Orders section of the refill encounter.              Passed - Patient is age 6-64 years        Passed - Normal CBC on file in past 12 months     Recent Labs   Lab Test 10/18/21  1128   WBC 5.0   RBC 4.72   HGB 14.9   HCT 42.6                    Passed - Medication is active on med list        Passed - No active pregnancy on record        Passed - Normal serum creatinine on file in past 12 months     Recent Labs   Lab Test 10/18/21  1128 05/05/20  1408 02/06/20  1659   CR 0.88   < >  --    CREAT  --   --  1.3*    < > = values in this interval not displayed.       Ok to refill medication if creatinine is low          Passed - No positive pregnancy test in past 12 months           Xin FLOWERS RN, BSN  [facility]    "

## 2021-12-20 LAB
CANNABINOIDS UR CFM-MCNC: <5 NG/ML
CARBOXYTHC/CREAT UR: NORMAL

## 2021-12-20 RX ORDER — CELECOXIB 200 MG/1
CAPSULE ORAL
Qty: 60 CAPSULE | Refills: 0 | Status: SHIPPED | OUTPATIENT
Start: 2021-12-20 | End: 2022-01-07

## 2021-12-21 ENCOUNTER — TELEPHONE (OUTPATIENT)
Dept: PALLIATIVE MEDICINE | Facility: OTHER | Age: 61
End: 2021-12-21
Payer: COMMERCIAL

## 2021-12-21 NOTE — TELEPHONE ENCOUNTER
Rn spoke to pharmacy and pharmacy states they are getting it ready now, its just not ready to be picked up yet.  Nothing more needs to be done.

## 2021-12-21 NOTE — TELEPHONE ENCOUNTER
Pt leaves voice message regarding her oxycodone. States that her prescription was changed and Dr. Berry said it was ok to  12/21 but when she called the pharmacy they had not received an order.

## 2021-12-23 ENCOUNTER — ANCILLARY PROCEDURE (OUTPATIENT)
Dept: CT IMAGING | Facility: CLINIC | Age: 61
End: 2021-12-23
Attending: INTERNAL MEDICINE
Payer: COMMERCIAL

## 2021-12-23 DIAGNOSIS — C83.30 DIFFUSE LARGE B-CELL LYMPHOMA, UNSPECIFIED BODY REGION (H): ICD-10-CM

## 2021-12-23 DIAGNOSIS — Z85.3 PERSONAL HISTORY OF MALIGNANT NEOPLASM OF BREAST: ICD-10-CM

## 2021-12-23 DIAGNOSIS — C73 PAPILLARY CARCINOMA, FOLLICULAR VARIANT (H): ICD-10-CM

## 2021-12-23 LAB
CREAT BLD-MCNC: 1.2 MG/DL (ref 0.5–1)
ETHANOL UR QL CFM: NEGATIVE
ETHYL GLUCURONIDE UR QL SCN: NOT DETECTED NG/MG CREAT
ETHYL SULFATE/CREAT UR: NOT DETECTED NG/MG CREAT
GFR SERPL CREATININE-BSD FRML MDRD: 51 ML/MIN/1.73M2
LEVEL OF DETECTION (ETHANOL): NORMAL

## 2021-12-23 PROCEDURE — 74177 CT ABD & PELVIS W/CONTRAST: CPT | Performed by: STUDENT IN AN ORGANIZED HEALTH CARE EDUCATION/TRAINING PROGRAM

## 2021-12-23 PROCEDURE — 71260 CT THORAX DX C+: CPT | Performed by: STUDENT IN AN ORGANIZED HEALTH CARE EDUCATION/TRAINING PROGRAM

## 2021-12-23 RX ORDER — IOPAMIDOL 755 MG/ML
128 INJECTION, SOLUTION INTRAVASCULAR ONCE
Status: COMPLETED | OUTPATIENT
Start: 2021-12-23 | End: 2021-12-23

## 2021-12-23 RX ADMIN — IOPAMIDOL 128 ML: 755 INJECTION, SOLUTION INTRAVASCULAR at 09:14

## 2021-12-28 ENCOUNTER — MYC MEDICAL ADVICE (OUTPATIENT)
Dept: ONCOLOGY | Facility: CLINIC | Age: 61
End: 2021-12-28
Payer: COMMERCIAL

## 2021-12-28 ENCOUNTER — PATIENT OUTREACH (OUTPATIENT)
Dept: ONCOLOGY | Facility: CLINIC | Age: 61
End: 2021-12-28
Payer: COMMERCIAL

## 2021-12-28 DIAGNOSIS — C73 PAPILLARY CARCINOMA, FOLLICULAR VARIANT (H): ICD-10-CM

## 2021-12-28 DIAGNOSIS — C83.30 DIFFUSE LARGE B-CELL LYMPHOMA, UNSPECIFIED BODY REGION (H): Primary | ICD-10-CM

## 2021-12-28 DIAGNOSIS — Z85.3 PERSONAL HISTORY OF MALIGNANT NEOPLASM OF BREAST: ICD-10-CM

## 2021-12-28 NOTE — PROGRESS NOTES
RN CARE COORDINATION NOTE      Outgoing:    Called patient to discuss her CT results and recommendation for a Liver MRI.     Unable to reach patient, left voicemail message asking for a return call.     Rollbar message also sent.     Gauri Lomax MSN, RN, OCN  RN Care Coordinator  [facility]  [phone]

## 2021-12-30 ENCOUNTER — MYC MEDICAL ADVICE (OUTPATIENT)
Dept: ONCOLOGY | Facility: CLINIC | Age: 61
End: 2021-12-30
Payer: COMMERCIAL

## 2021-12-31 ENCOUNTER — HOSPITAL ENCOUNTER (OUTPATIENT)
Dept: MRI IMAGING | Facility: CLINIC | Age: 61
Discharge: HOME | End: 2021-12-31
Attending: INTERNAL MEDICINE | Admitting: INTERNAL MEDICINE
Payer: COMMERCIAL

## 2021-12-31 DIAGNOSIS — C83.30 DIFFUSE LARGE B-CELL LYMPHOMA, UNSPECIFIED BODY REGION (H): ICD-10-CM

## 2021-12-31 DIAGNOSIS — C73 PAPILLARY CARCINOMA, FOLLICULAR VARIANT (H): ICD-10-CM

## 2021-12-31 DIAGNOSIS — G89.4 CHRONIC PAIN SYNDROME: ICD-10-CM

## 2021-12-31 DIAGNOSIS — Z85.3 PERSONAL HISTORY OF MALIGNANT NEOPLASM OF BREAST: ICD-10-CM

## 2021-12-31 PROCEDURE — A9581 GADOXETATE DISODIUM INJ: HCPCS | Performed by: INTERNAL MEDICINE

## 2021-12-31 PROCEDURE — 74183 MRI ABD W/O CNTR FLWD CNTR: CPT

## 2021-12-31 PROCEDURE — 74183 MRI ABD W/O CNTR FLWD CNTR: CPT | Mod: 26 | Performed by: RADIOLOGY

## 2021-12-31 PROCEDURE — 255N000002 HC RX 255 OP 636: Performed by: INTERNAL MEDICINE

## 2021-12-31 RX ORDER — OXYCODONE HYDROCHLORIDE 30 MG/1
30 TABLET ORAL EVERY 4 HOURS PRN
Qty: 84 TABLET | Refills: 0 | Status: SHIPPED | OUTPATIENT
Start: 2022-01-04 | End: 2022-01-13

## 2021-12-31 RX ADMIN — GADOXETATE DISODIUM 10 ML: 181.43 INJECTION, SOLUTION INTRAVENOUS at 15:38

## 2021-12-31 NOTE — TELEPHONE ENCOUNTER
Received call from patient requesting refill(s) of Oxycodone     Last dispensed from pharmacy on not noted on outside med list. (Check , I'm not authorized yet)    Patient's last office/virtual visit by prescribing provider on 12/14/21  Next office/virtual appointment scheduled for 1/28/22    Last urine drug screen date 12/2021  Current opioid agreement on file (completed within the last year) Yes Date of opioid agreement: 12/2021    E-prescribe to [facility] pharmacy  Pending Prescriptions:                       Disp   Refills    oxyCODONE IR (ROXICODONE) 30 MG tablet    84 tab*0            Sig: Take 1 tablet (30 mg) by mouth every 4 hours as           needed for severe pain Use dates: 1/4/22-1/18/22

## 2021-12-31 NOTE — TELEPHONE ENCOUNTER
Patient leaves a message requesting a refill of Oxycodone.  Patient states her insurance will not cover her medication in the month of January so is requesting to  today and start on 1/4/22.  Patient needs a visit scheduled so will route to scheduling for assistance and provider for advisement on early .

## 2022-01-03 DIAGNOSIS — R07.89 CHEST WALL PAIN: ICD-10-CM

## 2022-01-03 DIAGNOSIS — M79.18 MYOFASCIAL PAIN: ICD-10-CM

## 2022-01-03 RX ORDER — CYCLOBENZAPRINE HCL 10 MG
10 TABLET ORAL 3 TIMES DAILY PRN
Qty: 30 TABLET | Refills: 0 | Status: SHIPPED | OUTPATIENT
Start: 2022-01-03 | End: 2022-01-13

## 2022-01-03 NOTE — TELEPHONE ENCOUNTER
Received call from patient requesting refill(s) of Flexeril     Last dispensed from pharmacy: medication not on outside med list    Patient's last office/virtual visit by prescribing provider on 12/14/21  Next office/virtual appointment scheduled for 1/28/22    E-prescribe to [facility] pharmacy  Pending Prescriptions:                       Disp   Refills    cyclobenzaprine (FLEXERIL) 10 MG tablet   30 tab*0            Sig: Take 1 tablet (10 mg) by mouth 3 times daily as           needed for muscle spasms Use dates:           1/3/22-1/13/22

## 2022-01-03 NOTE — TELEPHONE ENCOUNTER
It was a horrible night on 12/31  Had migraine, nausea,vomiting afterwards Saturday, chest hurt and breathing hurt.   Sunday chest hurt and breathing hurt was contemplating going to the ER.   Having some residual chest soreness today, went to work today. So is at work doing her work today.   Migraine is gone, nausea and vomiting is gone right now. States that she did not write the my chart message to get a phone call and really only wants to know when she will get the results of her scan?

## 2022-01-04 ENCOUNTER — TELEPHONE (OUTPATIENT)
Dept: ONCOLOGY | Facility: CLINIC | Age: 62
End: 2022-01-04
Payer: COMMERCIAL

## 2022-01-04 ENCOUNTER — PATIENT OUTREACH (OUTPATIENT)
Dept: ONCOLOGY | Facility: CLINIC | Age: 62
End: 2022-01-04
Payer: COMMERCIAL

## 2022-01-04 ENCOUNTER — APPOINTMENT (OUTPATIENT)
Dept: LAB | Facility: CLINIC | Age: 62
End: 2022-01-04
Payer: COMMERCIAL

## 2022-01-04 DIAGNOSIS — R07.1 CHEST PAIN ON BREATHING: Primary | ICD-10-CM

## 2022-01-04 NOTE — TELEPHONE ENCOUNTER
Placed call to patient to discuss scheduling a lab appointment in [place] today (per yonny from Gauri Lomax).  Voicemail was full, MyChart message sent to pt.

## 2022-01-05 ENCOUNTER — TELEPHONE (OUTPATIENT)
Dept: PALLIATIVE MEDICINE | Facility: OTHER | Age: 62
End: 2022-01-05
Payer: COMMERCIAL

## 2022-01-12 ENCOUNTER — ANCILLARY PROCEDURE (OUTPATIENT)
Dept: CARDIOLOGY | Facility: CLINIC | Age: 62
End: 2022-01-12
Payer: COMMERCIAL

## 2022-01-12 DIAGNOSIS — C73 PAPILLARY CARCINOMA, FOLLICULAR VARIANT (H): ICD-10-CM

## 2022-01-12 DIAGNOSIS — Z85.3 PERSONAL HISTORY OF MALIGNANT NEOPLASM OF BREAST: ICD-10-CM

## 2022-01-12 DIAGNOSIS — C83.30 DIFFUSE LARGE B-CELL LYMPHOMA, UNSPECIFIED BODY REGION (H): ICD-10-CM

## 2022-01-12 LAB
3D LVEF ECHO: NORMAL
LVEF ECHO: NORMAL

## 2022-01-12 PROCEDURE — 93306 TTE W/DOPPLER COMPLETE: CPT | Performed by: INTERNAL MEDICINE

## 2022-01-13 ENCOUNTER — TELEPHONE (OUTPATIENT)
Dept: PALLIATIVE MEDICINE | Facility: OTHER | Age: 62
End: 2022-01-13
Payer: COMMERCIAL

## 2022-01-13 DIAGNOSIS — M79.18 MYOFASCIAL PAIN: ICD-10-CM

## 2022-01-13 DIAGNOSIS — G89.4 CHRONIC PAIN SYNDROME: ICD-10-CM

## 2022-01-13 DIAGNOSIS — R07.89 CHEST WALL PAIN: ICD-10-CM

## 2022-01-13 RX ORDER — OXYCODONE HYDROCHLORIDE 30 MG/1
30 TABLET ORAL EVERY 4 HOURS PRN
Qty: 84 TABLET | Refills: 0 | Status: SHIPPED | OUTPATIENT
Start: 2022-01-18 | End: 2022-03-11

## 2022-01-13 RX ORDER — CYCLOBENZAPRINE HCL 10 MG
10 TABLET ORAL 3 TIMES DAILY PRN
Qty: 30 TABLET | Refills: 0 | Status: SHIPPED | OUTPATIENT
Start: 2022-01-13 | End: 2024-02-27

## 2022-01-13 NOTE — TELEPHONE ENCOUNTER
Refill request: flexeril, oxycodone, celebrex  Last apt with Be: 21  UDT/CSA = 2021  Next apt with BE: 22    Last dispense date:  Oxycodone: 1/3/22-14 days  Flexeril 10 m/3/22-10 days  Celebrex: 22-30 days    Pending Prescriptions:                       Disp   Refills    oxyCODONE IR (ROXICODONE) 30 MG tablet    84 tab*0            Sig: Take 1 tablet (30 mg) by mouth every 4 hours as           needed for severe pain Use start: 22    cyclobenzaprine (FLEXERIL) 10 MG tablet   30 tab*0            Sig: Take 1 tablet (10 mg) by mouth 3 times daily as           needed for muscle spasms    Cub

## 2022-01-14 DIAGNOSIS — D47.02 MAST CELL DISEASE, SYSTEMIC: Chronic | ICD-10-CM

## 2022-01-14 RX ORDER — HYDROXYZINE HYDROCHLORIDE 25 MG/1
TABLET, FILM COATED ORAL
Qty: 90 TABLET | Refills: 3 | Status: SHIPPED | OUTPATIENT
Start: 2022-01-14 | End: 2022-01-28

## 2022-01-14 NOTE — TELEPHONE ENCOUNTER
Received fax request from [id number] - [id number] - [phone] [facility]  pharmacy requesting refill(s) for hydrOXYzine (ATARAX) 25 MG tablet    Last refilled on 12/14/2021    Pt last seen on 12/14/2021  Next appt scheduled for 01/28/2022  Will facilitate refill.    Nguyen Chun MA  [facility] [place]

## 2022-01-28 ENCOUNTER — TELEPHONE (OUTPATIENT)
Dept: PALLIATIVE MEDICINE | Facility: OTHER | Age: 62
End: 2022-01-28

## 2022-01-28 ENCOUNTER — OFFICE VISIT (OUTPATIENT)
Dept: PALLIATIVE MEDICINE | Facility: OTHER | Age: 62
End: 2022-01-28
Payer: COMMERCIAL

## 2022-01-28 VITALS
SYSTOLIC BLOOD PRESSURE: 206 MMHG | WEIGHT: 210 LBS | OXYGEN SATURATION: 100 % | BODY MASS INDEX: 34.95 KG/M2 | DIASTOLIC BLOOD PRESSURE: 92 MMHG

## 2022-01-28 DIAGNOSIS — G89.4 CHRONIC PAIN DISORDER: Primary | ICD-10-CM

## 2022-01-28 DIAGNOSIS — R07.1 CHEST PAIN ON BREATHING: ICD-10-CM

## 2022-01-28 DIAGNOSIS — D47.02 MAST CELL DISEASE, SYSTEMIC: Chronic | ICD-10-CM

## 2022-01-28 DIAGNOSIS — G89.4 CHRONIC PAIN DISORDER: ICD-10-CM

## 2022-01-28 DIAGNOSIS — C85.10 HIGH GRADE B-CELL LYMPHOMA (H): ICD-10-CM

## 2022-01-28 DIAGNOSIS — R11.2 NAUSEA AND VOMITING, INTRACTABILITY OF VOMITING NOT SPECIFIED, UNSPECIFIED VOMITING TYPE: ICD-10-CM

## 2022-01-28 PROCEDURE — G0463 HOSPITAL OUTPT CLINIC VISIT: HCPCS

## 2022-01-28 PROCEDURE — 99214 OFFICE O/P EST MOD 30 MIN: CPT | Performed by: ANESTHESIOLOGY

## 2022-01-28 RX ORDER — KETOROLAC TROMETHAMINE 30 MG/ML
30 INJECTION, SOLUTION INTRAMUSCULAR; INTRAVENOUS EVERY 6 HOURS PRN
Qty: 4 ML | Refills: 3 | Status: SHIPPED | OUTPATIENT
Start: 2022-01-28 | End: 2023-12-27

## 2022-01-28 RX ORDER — KETOROLAC TROMETHAMINE 30 MG/ML
30 INJECTION, SOLUTION INTRAMUSCULAR; INTRAVENOUS EVERY 6 HOURS PRN
Qty: 4 ML | Refills: 3 | Status: SHIPPED | OUTPATIENT
Start: 2022-01-28 | End: 2022-01-28

## 2022-01-28 RX ORDER — CYCLOBENZAPRINE HCL 10 MG
10 TABLET ORAL 3 TIMES DAILY PRN
Qty: 90 TABLET | Refills: 11 | Status: SHIPPED | OUTPATIENT
Start: 2022-01-28 | End: 2022-05-26

## 2022-01-28 RX ORDER — KETAMINE HCL 100 %
POWDER (GRAM) MISCELLANEOUS
Qty: 90 G | Refills: 3 | Status: SHIPPED | OUTPATIENT
Start: 2022-01-28 | End: 2022-04-14

## 2022-01-28 RX ORDER — HYDROXYZINE HYDROCHLORIDE 25 MG/1
TABLET, FILM COATED ORAL
Qty: 90 TABLET | Refills: 11 | Status: SHIPPED | OUTPATIENT
Start: 2022-01-28 | End: 2022-05-26

## 2022-01-28 RX ORDER — OXYCODONE HYDROCHLORIDE 30 MG/1
30 TABLET ORAL EVERY 4 HOURS PRN
Qty: 84 TABLET | Refills: 0 | Status: SHIPPED | OUTPATIENT
Start: 2022-02-01 | End: 2022-02-10

## 2022-01-28 ASSESSMENT — PAIN SCALES - GENERAL: PAINLEVEL: MODERATE PAIN (4)

## 2022-01-28 NOTE — LETTER
1/28/2022         RE: Tisha Arias  [address] [place] [address]        Dear Colleague,    Thank you for referring your patient, Tisha Arias, to the [facility]. Please see a copy of my visit note below.    Patient presents to the clinic today for a follow up with ADRI DIXON MD  regarding Pain Management.       PEG Score 12/14/2021 1/28/2022   PEG Total Score 9 4.67          UDT/CSA = 12/14/2021      QUESTIONS:        Nguyen Chun MA  [facility]           Subjective   Elvin is a 61 year old who presents for the following health issues     Neuropathic pain after chemotherapy and radiation.    HPI     She reviews presently working 12-hour shifts 5 days a week in the [employer] department. Since increasing the oxycodone to the 30 mg every 4 hours she is doing much better. She reviews with some frustration that took years again to get back to this dose. We reviewed the records from other providers, the atmosphere about opioids and advancing opioids.    She does continue with the ketamine sometimes up to 4 times a day which is helpful.    She did have an MRI 2 weeks ago of her abdomen, being followed concern for findings in her liver. They were thought not to be related to cancer. Did have chest pains. 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    Is using Flexeril 10 mg 3 times a day and hydroxyzine 25 mg several times a day which augments the oxycodone.    Reviewed the oxymorphone very quickly caused her to have adverse effects.    Has had an vitamin K2. She notes had been on in the past, unclear how helpful it wants to continue.    Wonders what can do with flareups. Has had Toradol injections in the past with some benefit would like to have that on hand.      reviewed. Last urine drug test in December    Current Outpatient Medications:      ACAI RAMIREZ PO, Take 50 mg by mouth, Disp: , Rfl:      acetaminophen (TYLENOL) 325 MG tablet, Take 2 tablets (650 mg) by mouth every 4 hours as needed for mild pain or fever, Disp: 100 tablet, Rfl:      albuterol (VENTOLIN HFA) 108 (90 Base) MCG/ACT inhaler, Inhale 1-2 puffs into the lungs every 4 hours as needed for shortness of breath / dyspnea or wheezing, Disp: 18 g, Rfl: 11     calcitRIOL (ROCALTROL) 0.25 MCG capsule, take 2 capsules (0.5 mcg) by mouth in the AM and 1 capsule (0.25 mcg) in the PM. Virtual/video visit requested., Disp: 270 capsule, Rfl: 4     calcium citrate-vitamin D (CALCIUM CITRATE + D3) 315-250 MG-UNIT TABS per tablet, Take 2 tablets by mouth 2 times daily, Disp: 60 tablet, Rfl: 2     celecoxib (CELEBREX) 200 MG capsule, Take 1 capsule (200 mg) by mouth TWO times daily, Disp: 60 capsule, Rfl: 3     cetirizine (ZYRTEC ALLERGY) 10 MG tablet, Take 1 tablet (10 mg) by mouth 3 times daily On hold for lab test., Disp: 90 tablet, Rfl: 0     Cholecalciferol (VITAMIN D3) 50 MCG (2000 UT) CAPS, Take 2,000 Units by mouth daily, Disp: 90 capsule, Rfl: 1     cromolyn (OPTICROM) 4 % ophthalmic solution, Place 1 drop into both eyes 4 times daily, Disp: 10 mL, Rfl: 0     cromolyn sodium (NASALCROM) 5.2 MG/ACT nasal aerosol, SPRAY ONE SPRAY( 1 ML) IN NOSTRIL DAILY, Disp: 1 mL, Rfl: 3     cyclobenzaprine (FLEXERIL) 10 MG tablet, Take 1 tablet (10 mg) by mouth 3 times daily as needed for muscle spasms, Disp: 90 tablet, Rfl: 11     cyclobenzaprine (FLEXERIL) 10 MG tablet, Take 1 tablet (10 mg) by mouth 3 times daily as needed for muscle spasms, Disp: 30 tablet, Rfl: 0     diclofenac (VOLTAREN) 1 % topical gel, Apply affected area two times daily as needed using enclosed dosing card. (2-4 grams to chest wall), Disp: 100 g, Rfl: 1     famotidine (PEPCID) 20 MG tablet, Take 20 mg by mouth 2 times daily, Disp: , Rfl:      fluticasone  (FLONASE) 50 MCG/ACT nasal spray, Spray 1-2 sprays into both nostrils daily, Disp: 16 g, Rfl: 2     furosemide (LASIX) 20 MG tablet, Take 1 tablet (20 mg) by mouth 2 times daily, Disp: 90 tablet, Rfl: 3     hydrOXYzine (ATARAX) 25 MG tablet, One to two tabs every six hours for itching, Disp: 90 tablet, Rfl: 11     ketamine HCl POWD, 120 mg lozenge one-half to one lozenge up to 4 times a day as needed, #90, Disp: 90 g, Rfl: 3     ketamine HCl POWD, Ketamine 10% and lidocaine 10% topical cream, apply to feet three times a day, 30 mg tube, Disp: 300 g, Rfl: 1     KLOR-CON 20 MEQ CR tablet, TAKE ONE TABLET BY MOUTH TWICE DAILY, Disp: 60 tablet, Rfl: 0     levothyroxine (SYNTHROID/LEVOTHROID) 112 MCG tablet, TAKE 2 TABLETS (224 MCG) BY MOUTH DAILY, Disp: 180 tablet, Rfl: 4     lidocaine (XYLOCAINE) 5 % external ointment, Apply quarter size amount to chest and back up to 3 times daily as needed for pain., Disp: 350 g, Rfl: 1     lidocaine-prilocaine (EMLA) cream, Apply topically as needed for moderate pain, Disp: 60 g, Rfl: 1     magnesium oxide (MAG-OX) 400 (241.3 Mg) MG tablet, Take 1 tablet (400 mg) by mouth 3 times daily (with meals) Use dates: 12/9/21-4/8/22, Disp: 90 tablet, Rfl: 3     magnesium oxide (MAG-OX) 400 MG tablet, Take 400 mg by mouth 3 times daily, Disp: , Rfl:      Menaquinone-7 (VITAMIN K2) 100 MCG CAPS, Take 1 capsule by mouth 2 times daily, Disp: 60 capsule, Rfl: 3     montelukast (SINGULAIR) 10 MG tablet, TAKE TWO TABLETS BY MOUTH TWICE DAILY , Disp: 120 tablet, Rfl: 0     naloxone (NARCAN) nasal spray, Spray 1 spray (4 mg) into one nostril alternating nostrils as needed for opioid reversal every 2-3 minutes until assistance arrives, Disp: 0.2 mL, Rfl: 0     olopatadine (PATANOL) 0.1 % ophthalmic solution, Apply 1 drop to eye, Disp: , Rfl:      omeprazole (PRILOSEC) 10 MG DR capsule, Take 2 capsules (20 mg) by mouth daily, Disp: 30 capsule, Rfl: 1     [START ON 2/1/2022] oxyCODONE IR (ROXICODONE) 30  MG tablet, Take 1 tablet (30 mg) by mouth every 4 hours as needed for severe pain, Disp: 84 tablet, Rfl: 0     oxyCODONE IR (ROXICODONE) 30 MG tablet, Take 1 tablet (30 mg) by mouth every 4 hours as needed for severe pain Use start: 1/18/22, Disp: 84 tablet, Rfl: 0     Probiotic Product (PROBIOTIC DAILY PO), Take 1 capsule by mouth daily Lacto acid bifidobacterium, Disp: , Rfl:      ranitidine (ZANTAC) 75 MG tablet, Take 1 tablet (75 mg) by mouth 3 times daily, Disp: 90 tablet, Rfl: 3     SUMAtriptan (IMITREX) 50 MG tablet, Take 1 tablet by mouth as needed for migraine . May repeat dose in 2 hours as needed. Maximum dose 4 tablets in 24 hours, Disp: 9 tablet, Rfl: 11     tobramycin (TOBREX) 0.3 % ophthalmic solution, ADMINISTER 1 2 DROPS TO THE RIGHT EYE EVERY 4 (FOUR) HOURS FOR 7 DAYS., Disp: , Rfl:      tobramycin-dexamethasone (TOBRADEX) 0.3-0.1 % ophthalmic suspension, INSTILL ONE DROP INTO BOTH EYES THREE TIMES DAILY, Disp: , Rfl:      triamcinolone (KENALOG) 0.025 % ointment, Apply topically as needed, Disp: , Rfl: 3     Triamcinolone Acetonide (AZMACORT IN), Inhale 2 puffs into the lungs as needed, Disp: , Rfl:      UNABLE TO FIND, Take 2 capsules by mouth 3 times daily Muscle Mag. 2 caps contain B1 20mg, B2 20mg, B6 10mg, magesium 20mg, manganese 2mg., Disp: , Rfl:      UNABLE TO FIND, 3 tablets 3 times daily MEDICATION NAME: calcium D-Glucarate  3 caps contain 180mg of elemental calcium., Disp: , Rfl:      UNABLE TO FIND, 1 tablet daily MEDICATION NAME: Pure Encapsulations, Disp: , Rfl:      ketorolac (TORADOL) 30 MG/ML injection, Inject 1 mL (30 mg) into the muscle every 6 hours as needed for moderate pain, Disp: 4 mL, Rfl: 3  She is alert with a clear sensorium good eye contact. Dressed in her work uniform. No respiratory distress.    Review of Systems         Objective    BP (!) 206/92   Wt 95.3 kg (210 lb)   SpO2 100%   BMI 34.95 kg/m    Body mass index is 34.95 kg/m .  Physical Exam       Total time  30 minutes              Again, thank you for allowing me to participate in the care of your patient.        Sincerely,        ADRI DIXON MD

## 2022-01-28 NOTE — PROGRESS NOTES
Patient presents to the clinic today for a follow up with ADRI DIXON MD  regarding Pain Management.       PEG Score 12/14/2021 1/28/2022   PEG Total Score 9 4.67          UDT/CSA = 12/14/2021      QUESTIONS:        Nguyen Chun MA  [facility]

## 2022-01-28 NOTE — TELEPHONE ENCOUNTER
Re-queued medication for proper route to be sent to pharmacy and will sent a message to the PA team in order to initiate a PA as advised by provider.        Pending Prescriptions:                       Disp   Refills    ketorolac (TORADOL) 30 MG/ML injection    4 mL   3            Sig: Inject 1 mL (30 mg) into the muscle every 6 hours           as needed for moderate pain    Cub

## 2022-01-28 NOTE — PROGRESS NOTES
Subjective   Elvin is a 61 year old who presents for the following health issues     Neuropathic pain after chemotherapy and radiation.    HPI     She reviews presently working 12-hour shifts 5 days a week in the [employer] department. Since increasing the oxycodone to the 30 mg every 4 hours she is doing much better. She reviews with some frustration that took years again to get back to this dose. We reviewed the records from other providers, the atmosphere about opioids and advancing opioids.    She does continue with the ketamine sometimes up to 4 times a day which is helpful.    She did have an MRI 2 weeks ago of her abdomen, being followed concern for findings in her liver. They were thought not to be related to cancer. Did have chest pains. 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    Is using Flexeril 10 mg 3 times a day and hydroxyzine 25 mg several times a day which augments the oxycodone.    Reviewed the oxymorphone very quickly caused her to have adverse effects.    Has had an vitamin K2. She notes had been on in the past, unclear how helpful it wants to continue.    Wonders what can do with flareups. Has had Toradol injections in the past with some benefit would like to have that on hand.     reviewed. Last urine drug test in December    Current Outpatient Medications:      ACAI RAMIREZ PO, Take 50 mg by mouth, Disp: , Rfl:      acetaminophen (TYLENOL) 325 MG tablet, Take 2 tablets (650 mg) by mouth every 4 hours as needed for mild pain or fever, Disp: 100 tablet, Rfl:      albuterol (VENTOLIN HFA) 108 (90 Base) MCG/ACT inhaler, Inhale 1-2 puffs into the lungs every 4 hours as needed for shortness of breath / dyspnea or wheezing, Disp: 18 g, Rfl: 11     calcitRIOL (ROCALTROL) 0.25 MCG capsule, take 2 capsules (0.5 mcg)  by mouth in the AM and 1 capsule (0.25 mcg) in the PM. Virtual/video visit requested., Disp: 270 capsule, Rfl: 4     calcium citrate-vitamin D (CALCIUM CITRATE + D3) 315-250 MG-UNIT TABS per tablet, Take 2 tablets by mouth 2 times daily, Disp: 60 tablet, Rfl: 2     celecoxib (CELEBREX) 200 MG capsule, Take 1 capsule (200 mg) by mouth TWO times daily, Disp: 60 capsule, Rfl: 3     cetirizine (ZYRTEC ALLERGY) 10 MG tablet, Take 1 tablet (10 mg) by mouth 3 times daily On hold for lab test., Disp: 90 tablet, Rfl: 0     Cholecalciferol (VITAMIN D3) 50 MCG (2000 UT) CAPS, Take 2,000 Units by mouth daily, Disp: 90 capsule, Rfl: 1     cromolyn (OPTICROM) 4 % ophthalmic solution, Place 1 drop into both eyes 4 times daily, Disp: 10 mL, Rfl: 0     cromolyn sodium (NASALCROM) 5.2 MG/ACT nasal aerosol, SPRAY ONE SPRAY( 1 ML) IN NOSTRIL DAILY, Disp: 1 mL, Rfl: 3     cyclobenzaprine (FLEXERIL) 10 MG tablet, Take 1 tablet (10 mg) by mouth 3 times daily as needed for muscle spasms, Disp: 90 tablet, Rfl: 11     cyclobenzaprine (FLEXERIL) 10 MG tablet, Take 1 tablet (10 mg) by mouth 3 times daily as needed for muscle spasms, Disp: 30 tablet, Rfl: 0     diclofenac (VOLTAREN) 1 % topical gel, Apply affected area two times daily as needed using enclosed dosing card. (2-4 grams to chest wall), Disp: 100 g, Rfl: 1     famotidine (PEPCID) 20 MG tablet, Take 20 mg by mouth 2 times daily, Disp: , Rfl:      fluticasone (FLONASE) 50 MCG/ACT nasal spray, Spray 1-2 sprays into both nostrils daily, Disp: 16 g, Rfl: 2     furosemide (LASIX) 20 MG tablet, Take 1 tablet (20 mg) by mouth 2 times daily, Disp: 90 tablet, Rfl: 3     hydrOXYzine (ATARAX) 25 MG tablet, One to two tabs every six hours for itching, Disp: 90 tablet, Rfl: 11     ketamine HCl POWD, 120 mg lozenge one-half to one lozenge up to 4 times a day as needed, #90, Disp: 90 g, Rfl: 3     ketamine HCl POWD, Ketamine 10% and lidocaine 10% topical cream, apply to feet three times a day, 30  mg tube, Disp: 300 g, Rfl: 1     KLOR-CON 20 MEQ CR tablet, TAKE ONE TABLET BY MOUTH TWICE DAILY, Disp: 60 tablet, Rfl: 0     levothyroxine (SYNTHROID/LEVOTHROID) 112 MCG tablet, TAKE 2 TABLETS (224 MCG) BY MOUTH DAILY, Disp: 180 tablet, Rfl: 4     lidocaine (XYLOCAINE) 5 % external ointment, Apply quarter size amount to chest and back up to 3 times daily as needed for pain., Disp: 350 g, Rfl: 1     lidocaine-prilocaine (EMLA) cream, Apply topically as needed for moderate pain, Disp: 60 g, Rfl: 1     magnesium oxide (MAG-OX) 400 (241.3 Mg) MG tablet, Take 1 tablet (400 mg) by mouth 3 times daily (with meals) Use dates: 12/9/21-4/8/22, Disp: 90 tablet, Rfl: 3     magnesium oxide (MAG-OX) 400 MG tablet, Take 400 mg by mouth 3 times daily, Disp: , Rfl:      Menaquinone-7 (VITAMIN K2) 100 MCG CAPS, Take 1 capsule by mouth 2 times daily, Disp: 60 capsule, Rfl: 3     montelukast (SINGULAIR) 10 MG tablet, TAKE TWO TABLETS BY MOUTH TWICE DAILY , Disp: 120 tablet, Rfl: 0     naloxone (NARCAN) nasal spray, Spray 1 spray (4 mg) into one nostril alternating nostrils as needed for opioid reversal every 2-3 minutes until assistance arrives, Disp: 0.2 mL, Rfl: 0     olopatadine (PATANOL) 0.1 % ophthalmic solution, Apply 1 drop to eye, Disp: , Rfl:      omeprazole (PRILOSEC) 10 MG DR capsule, Take 2 capsules (20 mg) by mouth daily, Disp: 30 capsule, Rfl: 1     [START ON 2/1/2022] oxyCODONE IR (ROXICODONE) 30 MG tablet, Take 1 tablet (30 mg) by mouth every 4 hours as needed for severe pain, Disp: 84 tablet, Rfl: 0     oxyCODONE IR (ROXICODONE) 30 MG tablet, Take 1 tablet (30 mg) by mouth every 4 hours as needed for severe pain Use start: 1/18/22, Disp: 84 tablet, Rfl: 0     Probiotic Product (PROBIOTIC DAILY PO), Take 1 capsule by mouth daily Lacto acid bifidobacterium, Disp: , Rfl:      ranitidine (ZANTAC) 75 MG tablet, Take 1 tablet (75 mg) by mouth 3 times daily, Disp: 90 tablet, Rfl: 3     SUMAtriptan (IMITREX) 50 MG tablet,  Take 1 tablet by mouth as needed for migraine . May repeat dose in 2 hours as needed. Maximum dose 4 tablets in 24 hours, Disp: 9 tablet, Rfl: 11     tobramycin (TOBREX) 0.3 % ophthalmic solution, ADMINISTER 1 2 DROPS TO THE RIGHT EYE EVERY 4 (FOUR) HOURS FOR 7 DAYS., Disp: , Rfl:      tobramycin-dexamethasone (TOBRADEX) 0.3-0.1 % ophthalmic suspension, INSTILL ONE DROP INTO BOTH EYES THREE TIMES DAILY, Disp: , Rfl:      triamcinolone (KENALOG) 0.025 % ointment, Apply topically as needed, Disp: , Rfl: 3     Triamcinolone Acetonide (AZMACORT IN), Inhale 2 puffs into the lungs as needed, Disp: , Rfl:      UNABLE TO FIND, Take 2 capsules by mouth 3 times daily Muscle Mag. 2 caps contain B1 20mg, B2 20mg, B6 10mg, magesium 20mg, manganese 2mg., Disp: , Rfl:      UNABLE TO FIND, 3 tablets 3 times daily MEDICATION NAME: calcium D-Glucarate  3 caps contain 180mg of elemental calcium., Disp: , Rfl:      UNABLE TO FIND, 1 tablet daily MEDICATION NAME: Pure Encapsulations, Disp: , Rfl:      ketorolac (TORADOL) 30 MG/ML injection, Inject 1 mL (30 mg) into the muscle every 6 hours as needed for moderate pain, Disp: 4 mL, Rfl: 3  She is alert with a clear sensorium good eye contact. Dressed in her work uniform. No respiratory distress.    Review of Systems         Objective    BP (!) 206/92   Wt 95.3 kg (210 lb)   SpO2 100%   BMI 34.95 kg/m    Body mass index is 34.95 kg/m .  Physical Exam       Total time 30 minutes

## 2022-01-28 NOTE — TELEPHONE ENCOUNTER
Pharmacy leaves a message stating that the Toradol will need a PA.  After review, also would like provider to review route of medication.  Dr. Berry, please advise if a new Rx needs to be sent in or if just a PA needs to be started.

## 2022-01-28 NOTE — PATIENT INSTRUCTIONS
[facility] Number:  [phone]    Call with any questions about your care and for scheduling assistance.     Calls are returned Monday through Friday between 8 AM and 4:30 PM. We usually get back to you within 2 business days depending on the issue/request.    If we are prescribing your medications:    For opioid medication refills, call the clinic or send a MyChart message 7 days in advance.  Please include:    Name of requested medication    Name of the pharmacy.    For non-opioid medications, call your pharmacy directly to request a refill. Please allow 3-4 days to be processed.     Per [place] State Law:    All controlled substance prescriptions must be filled within 30 days of being written.      For those controlled substances allowing refills, pickup must occur within 30 days of last fill.      We believe regular attendance is key to your success in our program!      Any time you are unable to keep your appointment we ask that you call us at least 24 hours in advance to cancel.This will allow us to offer the appointment time to another patient.     Multiple missed appointments may lead to dismissal from the clinic.    [facility]    Clinic Number:  [phone]  Call with any questions about your care and for scheduling assistance.   Calls are returned Monday through Friday between 8 AM and 4:30 PM. We usually get back to you within 2 business days depending on the issue/request.    If we are prescribing your medications:  For opioid medication refills, call the clinic or send a MyChart message 7 days in advance.  Please include:  Name of requested medication  Name of the pharmacy.  For non-opioid medications, call your pharmacy directly to request a refill. Please allow 3-4 days to be processed.   Per [place] State Law:  All controlled substance prescriptions must be filled within 30 days of being written.    For those  controlled substances allowing refills, pickup must occur within 30 days of last fill.      We believe regular attendance is key to your success in our program!    Any time you are unable to keep your appointment we ask that you call us at least 24 hours in advance to cancel.This will allow us to offer the appointment time to another patient.   Multiple missed appointments may lead to dismissal from the clinic.    PLAN:    Vitamin D3 liquid form, 2000 units twice a day, and check a level again in 3 months.    Continue with the oxycodone 30 mg immediate release every 4 hours to 6 times a day.    Toradol injection 30 mg IM during flareups, no more than 4 doses every 2 weeks.    Continue the ketamine 1 to 20 mg lozenges 1/2 to 1 lozenge up to 4 times a day.    flexeril 10 mg 3 times a day as needed.    Follow-up with Dr. Berry in 8 weeks

## 2022-01-31 RX ORDER — ONDANSETRON 8 MG/1
TABLET, ORALLY DISINTEGRATING ORAL
Qty: 30 TABLET | Refills: 0 | OUTPATIENT
Start: 2022-01-31

## 2022-01-31 NOTE — TELEPHONE ENCOUNTER
Please assist patient with scheduling appointment. Thank you!      Kat HIRSCH RN  [facility]

## 2022-01-31 NOTE — TELEPHONE ENCOUNTER
Sent pt [employer] message. Please help pt schedule an appointment with pcp for refills on medications.    Rahel CLEMENT MA

## 2022-02-02 NOTE — TELEPHONE ENCOUNTER
Pharmacy calling to request this PA to be processed for:  ketorolac (TORADOL) 30 MG/ML injection 4 mL 3 1/28/2022  No   Sig - Route: Inject 1 mL (30 mg) into the muscle every 6 hours as needed for moderate pain - Intramuscular   Sent to pharmacy as: Ketorolac Tromethamine 30 MG/ML Injection Solution (TORADOL)   Class: E-Prescribe   Notes to Pharmacy: Please deactivate all other Rx for Toradol and Dispense with syringes   Order: [id number]   E-Prescribing Status: Receipt confirmed by pharmacy (1/28/2022  1:42 PM CST)     Cub pharmacy    To provider: pharmacy also requesting a phone call from clinic regarding awareness of interaction between celebrex and ketorolac.  Please review and route back to support pool if aware and to go ahead with toradol.

## 2022-02-03 NOTE — TELEPHONE ENCOUNTER
Call placed to pharmacy:  Relayed the providers plan that patient will hold celebrex if needing to take toradol.

## 2022-02-08 NOTE — TELEPHONE ENCOUNTER
PA Initiation    Medication: Ketorolac Tromethamine 30 MG/ML injection  Insurance Company: [employer] - Phone [phone] Fax [phone]  Pharmacy Filling the Rx: [id number] - [place] - [address] [place]  Filling Pharmacy Phone: [phone]  Filling Pharmacy Fax: [phone]  Start Date: 2/8/2022

## 2022-02-08 NOTE — TELEPHONE ENCOUNTER
PRIOR AUTHORIZATION DENIED    Medication: Ketorolac Tromethamine 30 MG/ML injection    Denial Date: 2/8/2022    Denial Rationale: Medication is not a covered benefit- excluded from coverage. Patient will have to pay out of pocket or use coupon/discount card.        Appeal Information: If provider would like to appeal this decision we will need a detailed letter of medical necessity to start the process. Then re-route this request back to the PA pool.

## 2022-02-10 DIAGNOSIS — R07.1 CHEST PAIN ON BREATHING: ICD-10-CM

## 2022-02-10 RX ORDER — OXYCODONE HYDROCHLORIDE 30 MG/1
30 TABLET ORAL EVERY 4 HOURS PRN
Qty: 84 TABLET | Refills: 0 | Status: SHIPPED | OUTPATIENT
Start: 2022-02-10 | End: 2022-02-25

## 2022-02-10 NOTE — TELEPHONE ENCOUNTER
Received call from patient requesting refill(s) of Oxycodone 30 mg     Last dispensed from pharmacy on 2/1/2022    Patient's last office/virtual visit by prescribing provider on 1/28/2022  Next office/virtual appointment scheduled for No follow up appointment scheduled    Last urine drug screen date 12/14/2021  Current opioid agreement on file (completed within the last year) Yes Date of opioid agreement: 12/14/2021    E-prescribe to [facility] pharmacy

## 2022-02-11 NOTE — TELEPHONE ENCOUNTER
No, inform pt not covered by her insurance             Sent pt a chart message informing of Toradol injection denial.

## 2022-02-12 ENCOUNTER — HEALTH MAINTENANCE LETTER (OUTPATIENT)
Age: 62
End: 2022-02-12

## 2022-02-25 ENCOUNTER — TELEPHONE (OUTPATIENT)
Dept: PALLIATIVE MEDICINE | Facility: OTHER | Age: 62
End: 2022-02-25
Payer: COMMERCIAL

## 2022-02-25 DIAGNOSIS — R07.1 CHEST PAIN ON BREATHING: ICD-10-CM

## 2022-02-25 RX ORDER — OXYCODONE HYDROCHLORIDE 30 MG/1
30 TABLET ORAL EVERY 4 HOURS PRN
Qty: 84 TABLET | Refills: 0 | Status: SHIPPED | OUTPATIENT
Start: 2022-02-25 | End: 2022-03-10

## 2022-02-25 NOTE — TELEPHONE ENCOUNTER
Received call from patient requesting refill(s) of:   OXYCODONE (ROXICODONE) 30 MG TABLET     Last dispensed from pharmacy on :   02/12/2022    Patient's last office/virtual visit by prescribing provider on :   01/28/2022    Next office/virtual appointment scheduled for:    NO FUTURE APPOINTMENTS HAVE BEEN MADE -   FOLLOW UP WITH DR. DIXON IN 8 WEEKS     Last urine drug screen date :  12/14/2021    Current opioid agreement on file (completed within the last year) Yes Date of opioid agreement:   12/14/2021    E-prescribe to   [id number] - [place], [place] - [address]   pharmacy    Will route to nursing [place] for review and preparation of prescription(s).     NICHOLE Cooper ([facility])............. February 25, 2022  11:20 AM

## 2022-02-25 NOTE — TELEPHONE ENCOUNTER
Pending Prescriptions:                       Disp   Refills    oxyCODONE IR (ROXICODONE) 30 MG tablet    84 tab*0            Sig: Take 1 tablet (30 mg) by mouth every 4 hours as           needed for severe pain    Cub

## 2022-02-28 DIAGNOSIS — D47.02 MAST CELL DISEASE, SYSTEMIC: Primary | ICD-10-CM

## 2022-02-28 RX ORDER — FAMOTIDINE 20 MG/1
20 TABLET, FILM COATED ORAL 2 TIMES DAILY
Qty: 60 TABLET | Refills: 4 | Status: SHIPPED | OUTPATIENT
Start: 2022-02-28 | End: 2022-05-26

## 2022-02-28 NOTE — TELEPHONE ENCOUNTER
Received call from patient requesting refill(s) of:   FAMOTIDINE (PEPCID) 20 MG TABLET     Last dispensed from pharmacy on :   01/28/2022    Patient's last office/virtual visit by prescribing provider on :   01/28/2022    Next office/virtual appointment scheduled for:    NO FUTURE APPOINTMENT HAVE BEEN MADE AT THIS TIME      E-prescribe to   [id number] - [place], [place] - [address] [place]  [phone]   pharmacy    Will route to nursing [place] for review and preparation of prescription(s).     NICHOLE Cooper ([facility])............. February 28, 2022  2:14 PM

## 2022-03-24 DIAGNOSIS — R07.1 CHEST PAIN ON BREATHING: ICD-10-CM

## 2022-03-24 RX ORDER — OXYCODONE HYDROCHLORIDE 30 MG/1
30 TABLET ORAL EVERY 4 HOURS PRN
Qty: 84 TABLET | Refills: 0 | Status: SHIPPED | OUTPATIENT
Start: 2022-03-29 | End: 2022-04-07

## 2022-03-24 NOTE — TELEPHONE ENCOUNTER
Received call from patient requesting refill(s) of Oxycodone     Last dispensed from pharmacy on 3/11/22    Patient's last office/virtual visit by prescribing provider on 1/28/22  Next office/virtual appointment scheduled for 4/13/22    Last urine drug screen date 12/2021  Current opioid agreement on file (completed within the last year) Yes Date of opioid agreement: 12/2021    E-prescribe to [facility] pharmacy  Pending Prescriptions:                       Disp   Refills    oxyCODONE IR (ROXICODONE) 30 MG tablet    84 tab*0            Sig: Take 1 tablet (30 mg) by mouth every 4 hours as           needed for severe pain Fill 3/25/22, start           3/29/22

## 2022-04-07 DIAGNOSIS — R07.1 CHEST PAIN ON BREATHING: ICD-10-CM

## 2022-04-07 RX ORDER — OXYCODONE HYDROCHLORIDE 30 MG/1
30 TABLET ORAL EVERY 4 HOURS PRN
Qty: 84 TABLET | Refills: 0 | Status: SHIPPED | OUTPATIENT
Start: 2022-04-12 | End: 2022-04-21

## 2022-04-07 NOTE — TELEPHONE ENCOUNTER
Received call from patient requesting refill(s) of Oxycodone      Last dispensed from pharmacy on 3/25/22     Patient's last office/virtual visit by prescribing provider on 1/28/22  Next office/virtual appointment scheduled for 4/13/22     Last urine drug screen date 12/2021  Current opioid agreement on file (completed within the last year) Yes Date of opioid agreement: 12/2021     E-prescribe to [facility] pharmacy  Pending Prescriptions:                       Disp   Refills    oxyCODONE IR (ROXICODONE) 30 MG tablet    84 tab*0            Sig: Take 1 tablet (30 mg) by mouth every 4 hours as           needed for severe pain Fill 4/8/22, start 4/12/22

## 2022-04-13 DIAGNOSIS — G89.4 CHRONIC PAIN DISORDER: ICD-10-CM

## 2022-04-13 NOTE — TELEPHONE ENCOUNTER
Patient requesting refill ketamine HCl [facility]    Pharmacy [facility] - [place], [place] - [address]  [facility] Patient Facilitator

## 2022-04-14 RX ORDER — KETAMINE HCL 100 %
POWDER (GRAM) MISCELLANEOUS
Qty: 90 G | Refills: 3 | Status: SHIPPED | OUTPATIENT
Start: 2022-04-14 | End: 2022-05-26

## 2022-04-14 NOTE — TELEPHONE ENCOUNTER
Pending Prescriptions:                       Disp   Refills    ketamine HCl POWD                         90 g   3            Si mg lozenge one-half to one lozenge up to 4           times a day as needed, #90    [facility]

## 2022-04-18 ENCOUNTER — OFFICE VISIT (OUTPATIENT)
Dept: FAMILY MEDICINE | Facility: CLINIC | Age: 62
End: 2022-04-18
Payer: COMMERCIAL

## 2022-04-18 ENCOUNTER — TELEPHONE (OUTPATIENT)
Dept: PALLIATIVE MEDICINE | Facility: OTHER | Age: 62
End: 2022-04-18

## 2022-04-18 VITALS
SYSTOLIC BLOOD PRESSURE: 158 MMHG | DIASTOLIC BLOOD PRESSURE: 102 MMHG | RESPIRATION RATE: 16 BRPM | HEART RATE: 108 BPM | OXYGEN SATURATION: 99 % | HEIGHT: 65 IN | WEIGHT: 206.6 LBS | TEMPERATURE: 98.2 F | BODY MASS INDEX: 34.42 KG/M2

## 2022-04-18 DIAGNOSIS — M79.2 NEUROPATHIC PAIN OF CHEST: Primary | ICD-10-CM

## 2022-04-18 DIAGNOSIS — J01.10 ACUTE NON-RECURRENT FRONTAL SINUSITIS: Primary | ICD-10-CM

## 2022-04-18 DIAGNOSIS — D89.42 IDIOPATHIC MAST CELL ACTIVATION SYNDROME (H): Chronic | ICD-10-CM

## 2022-04-18 DIAGNOSIS — G62.0 DRUG-INDUCED POLYNEUROPATHY (H): ICD-10-CM

## 2022-04-18 DIAGNOSIS — I10 BENIGN ESSENTIAL HYPERTENSION: ICD-10-CM

## 2022-04-18 PROCEDURE — 99213 OFFICE O/P EST LOW 20 MIN: CPT | Performed by: NURSE PRACTITIONER

## 2022-04-18 RX ORDER — CROMOLYN SODIUM 40 MG/ML
1 SOLUTION/ DROPS OPHTHALMIC 4 TIMES DAILY
Qty: 10 ML | Refills: 0 | Status: SHIPPED | OUTPATIENT
Start: 2022-04-18

## 2022-04-18 RX ORDER — KETAMINE HCL 100 %
POWDER (GRAM) MISCELLANEOUS
Qty: 90 G | Refills: 3 | Status: SHIPPED | OUTPATIENT
Start: 2022-04-18 | End: 2022-06-17

## 2022-04-18 RX ORDER — DOXYCYCLINE 100 MG/1
100 CAPSULE ORAL 2 TIMES DAILY
Qty: 20 CAPSULE | Refills: 0 | Status: SHIPPED | OUTPATIENT
Start: 2022-04-18 | End: 2022-04-28

## 2022-04-18 ASSESSMENT — ASTHMA QUESTIONNAIRES: ACT_TOTALSCORE: 16

## 2022-04-18 NOTE — PROGRESS NOTES
Assessment & Plan     Acute non-recurrent frontal sinusitis  Counseled on self-care measures including: saline nasal spray, OTC mucinex PRN, avoid decongestants due to elevated BP, rest, hydration with water; and warning signs of when to seek urgent medical care including: fever, worsening symptoms.  - doxycycline hyclate (VIBRAMYCIN) 100 MG capsule; Take 1 capsule (100 mg) by mouth 2 times daily for 10 days    Idiopathic mast cell activation syndrome (H)  Eyes are dry and itchy so will refill her cromolyn which has been helpful for her.   - cromolyn (OPTICROM) 4 % ophthalmic solution; Place 1 drop into both eyes 4 times daily    Benign essential hypertension  Counseled patient to follow up with PCP for physical, recheck of BP when infection is treated in 2 weeks.     Prescription drug management    See Patient Instructions    Return in about 2 weeks (around 5/2/2022) for Physical and recheck BP and sinus infection with PCP.    DENZEL [facility] [place] is a 61 year old who presents for the following health issues  accompanied by her none.    History of Present Illness       Reason for visit:  LUq pain& back  change in voice    She eats 0-1 servings of fruits and vegetables daily.She consumes 2 sweetened beverage(s) daily.She exercises with enough effort to increase her heart rate 10 to 19 minutes per day.    She is taking medications regularly.     Ongoing upper respiratory infection for 8-10 weeks.   Patient uses her inhaler 2-3 times per week.     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 She did not go in for flu other testing or treatment.     Acute Illness  Acute illness concerns: URI  Onset/Duration: 2.5 months  Symptoms:  Fever: no  Chills/Sweats: YES- sweats, hot flashes  Headache (location?): YES- increased migraines, has to take 2 imitrex over 2 hours to work  Sinus Pressure: YES-  "frontal  Conjunctivitis:  YES- blood shot  Ear Pain: YES- right worse than left  Rhinorrhea: no  Congestion: YES- chest  Sore Throat: YES- all the time  Cough: YES-productive of green sputum, with shortness of breath, waxing and waning over time, cough so hard she will vomit sometimes  Wheeze: YES- has been having to use her albuterol inhaler more often  Decreased Appetite: YES  Nausea: YES  Vomiting: YES- a couple times a week, has notice bright red blood  Diarrhea: no  Dysuria/Freq.: no  Dysuria or Hematuria: no  Fatigue/Achiness: YES, tired  Sick/Strep Exposure: no  Therapies tried and outcome: mucinex helps quiet the cough    Review of Systems   Constitutional, HEENT, cardiovascular, pulmonary, gi and gu systems are negative, except as otherwise noted.      Objective    BP (!) 158/102   Pulse 108   Temp 98.2  F (36.8  C) (Oral)   Resp 16   Ht 1.651 m (5' 5\")   Wt 93.7 kg (206 lb 9.6 oz)   LMP  (LMP Unknown)   SpO2 99%   Breastfeeding No   BMI 34.38 kg/m    Body mass index is 34.38 kg/m .  Physical Exam   GENERAL: alert, no distress and fatigued  EYES: Eyes grossly normal to inspection and conjunctiva/corneas- conjunctival injection bilateral  HENT: normal cephalic/atraumatic, right ear: TM injected, left ear: normal: no effusions, no erythema, normal landmarks, nasal mucosa edematous , oropharynx clear, oral mucous membranes moist and sinuses: maxillary, frontal tenderness on bilateral  NECK: no adenopathy, no asymmetry, masses, or scars and thyroid normal to palpation  RESP: lungs clear to auscultation - no rales, rhonchi or wheezes  CV: regular rate and rhythm, normal S1 S2, no S3 or S4, no murmur, click or rub, no peripheral edema and peripheral pulses strong  ABDOMEN: soft, nontender, no hepatosplenomegaly, no masses and bowel sounds normal  SKIN: no suspicious lesions or rashes  PSYCH: mentation appears normal, affect normal/bright          "

## 2022-04-18 NOTE — TELEPHONE ENCOUNTER
Last apt with BE: 4/13/22  Unclear if patient is still prescribed this compounded cream.    Please review, this medication is no longer part of the current medication list

## 2022-04-18 NOTE — TELEPHONE ENCOUNTER
Patient requesting refill Ketamine 10% and lidocaine 10% topical cream, apply to feet three times a day, 30 mg tube    [facility] [place] [place] [facility] [address]  [facility] Patient Facilitator

## 2022-04-19 ENCOUNTER — TELEPHONE (OUTPATIENT)
Dept: FAMILY MEDICINE | Facility: CLINIC | Age: 62
End: 2022-04-19
Payer: COMMERCIAL

## 2022-04-19 DIAGNOSIS — J01.10 ACUTE NON-RECURRENT FRONTAL SINUSITIS: Primary | ICD-10-CM

## 2022-04-19 RX ORDER — AMOXICILLIN AND CLAVULANATE POTASSIUM 500; 125 MG/1; MG/1
1 TABLET, FILM COATED ORAL 3 TIMES DAILY
Qty: 21 TABLET | Refills: 0 | Status: SHIPPED | OUTPATIENT
Start: 2022-04-19 | End: 2022-04-26

## 2022-04-19 NOTE — TELEPHONE ENCOUNTER
I have sent a Rx for Augmentin to her pharmacy on file. She will need to take it with food because it can also cause GI upset. Eating yogurt may also be helpful to manage the diarrhea.    Laila Coyne, DENZEL, CNP

## 2022-04-19 NOTE — TELEPHONE ENCOUNTER
Routing to Laila Coyne-     Pt seen yesterday 4/18/22 for sinus infection and placed on doxycycline. Pt states she took the 1st dose and felt very sick to her stomach with nausea. She took the 2nd dose and experienced nausea and vomiting and explosive diarrhea x2. She states she has been in the bathroom for the past 20 min. and feels terrible.     She is hoping to get a different antibiotic as she is not tolerating this one.     Pt uses [facility] pharmacy in [place].     Janice Schuler RN

## 2022-04-19 NOTE — TELEPHONE ENCOUNTER
Patient notified of provider message as written. Patient verbalized good understanding.     Zoey PERKINSN, RN  [facility]

## 2022-04-21 DIAGNOSIS — R07.1 CHEST PAIN ON BREATHING: ICD-10-CM

## 2022-04-21 RX ORDER — OXYCODONE HYDROCHLORIDE 30 MG/1
30 TABLET ORAL EVERY 4 HOURS PRN
Qty: 42 TABLET | Refills: 0 | Status: SHIPPED | OUTPATIENT
Start: 2022-04-21 | End: 2022-04-28

## 2022-04-21 NOTE — TELEPHONE ENCOUNTER
She calls for refill of oxycodone.  Was not able to be reached for the last schedule appointment and he is rescheduled.  He has been using 30 mg every 4 hours.  Was on 2-week increments.  We will change in 1 week increments until seen

## 2022-04-21 NOTE — TELEPHONE ENCOUNTER
Received call from patient requesting refill  Oxycodone 30 mg  Last dispensed from pharmacy on 4/9/22-14 days    Patient's last office/virtual visit by prescribing provider on 4/13/22  Next office/virtual appointment scheduled for 6/17/22    UDT/CSA = 12/14/2021    Pending Prescriptions:                       Disp   Refills    oxyCODONE IR (ROXICODONE) 30 MG tablet    84 tab*0            Sig: Take 1 tablet (30 mg) by mouth every 4 hours as           needed for severe pain Fill 4/24/22, start           4/26/22    Cub

## 2022-04-25 NOTE — TELEPHONE ENCOUNTER
RN tried reaching out to the patient and received voicemail.  RN left a message requesting a returned call to the clinic.

## 2022-04-25 NOTE — TELEPHONE ENCOUNTER
Patient called with questions regarding how much medication she received and is asking for a return call

## 2022-04-28 DIAGNOSIS — R07.1 CHEST PAIN ON BREATHING: ICD-10-CM

## 2022-04-28 RX ORDER — OXYCODONE HYDROCHLORIDE 30 MG/1
30 TABLET ORAL EVERY 4 HOURS PRN
Qty: 42 TABLET | Refills: 0 | Status: SHIPPED | OUTPATIENT
Start: 2022-05-03 | End: 2022-05-06

## 2022-04-28 NOTE — TELEPHONE ENCOUNTER
Received call from patient requesting refill(s) of Oxycodone    Last dispensed from pharmacy on 4/22/22    Patient's last office/virtual visit by prescribing provider on 1/28/22  Next office/virtual appointment scheduled for 6/17/22 4/13/22 NO SHOW, now on weekly scripts    Last urine drug screen date 12/2021  Current opioid agreement on file (completed within the last year) Yes Date of opioid agreement: 12/2021    E-prescribe to [facility] pharmacy  Pending Prescriptions:                       Disp   Refills    oxyCODONE IR (ROXICODONE) 30 MG tablet    42 tab*0            Sig: Take 1 tablet (30 mg) by mouth every 4 hours as           needed for severe pain Fill 4/29/22, start 5/3/22

## 2022-05-06 DIAGNOSIS — R07.1 CHEST PAIN ON BREATHING: ICD-10-CM

## 2022-05-06 RX ORDER — OXYCODONE HYDROCHLORIDE 30 MG/1
30 TABLET ORAL EVERY 4 HOURS PRN
Qty: 42 TABLET | Refills: 0 | Status: SHIPPED | OUTPATIENT
Start: 2022-05-10 | End: 2022-05-11

## 2022-05-06 NOTE — TELEPHONE ENCOUNTER
Received call from patient requesting refill(s) of Oxycodone     Last dispensed from pharmacy on 4/29/22     Patient's last office/virtual visit by prescribing provider on 1/28/22  Next office/virtual appointment scheduled for 6/17/22 4/13/22 NO SHOW, now on weekly scripts     Last urine drug screen date 12/2021  Current opioid agreement on file (completed within the last year) Yes Date of opioid agreement: 12/2021     E-prescribe to [facility] pharmacy  Pending Prescriptions:                       Disp   Refills    oxyCODONE IR (ROXICODONE) 30 MG tablet    42 tab*0            Sig: Take 1 tablet (30 mg) by mouth every 4 hours as           needed for severe pain Fill 5/6/22, start 5/10/22

## 2022-05-11 DIAGNOSIS — R07.1 CHEST PAIN ON BREATHING: ICD-10-CM

## 2022-05-11 RX ORDER — OXYCODONE HYDROCHLORIDE 30 MG/1
30 TABLET ORAL EVERY 4 HOURS PRN
Qty: 42 TABLET | Refills: 0 | Status: SHIPPED | OUTPATIENT
Start: 2022-05-11 | End: 2022-05-20

## 2022-05-11 NOTE — TELEPHONE ENCOUNTER
Received call from patient requesting refill(s) of Oxycodone    Last dispensed from pharmacy on 5/6 to start 5/10    Patient's last office/virtual visit by prescribing provider on 4/13  Next office/virtual appointment scheduled for 6/17    Last urine drug screen date 12/14/21  Current opioid agreement on file (completed within the last year) Yes Date of opioid agreement: 12/14/21  Pending Prescriptions:                       Disp   Refills    oxyCODONE IR (ROXICODONE) 30 MG tablet    42 tab*0            Sig: Take 1 tablet (30 mg) by mouth every 4 hours as           needed for severe pain Fill 5/13/22, start           5/17/22       Pt is asking if there is something she can take for pain in her hips and back. Since insurance denied Toradol IM she's asking if she could take an additional 1 or 2 Oxycodone/day. Also asks if she can get scripts for >7 days.

## 2022-05-16 DIAGNOSIS — R07.89 CHEST WALL PAIN: ICD-10-CM

## 2022-05-16 NOTE — LETTER
[facility]  [address]  [place] [address]  [phone]          May 25, 2022    Tisha Arias                                                                                                                     [address] [place] [address]  [place] [address]            Dear Tisha,    We have been unsuccessful reaching you by telephone. Please call the clinic at [phone] to schedule an appointment with a provider or let us know if you are getting care elsewhere.        Sincerely,         Pablo Soto MD

## 2022-05-18 DIAGNOSIS — R07.1 CHEST PAIN ON BREATHING: ICD-10-CM

## 2022-05-18 NOTE — TELEPHONE ENCOUNTER
Routing refill request to provider for review/approval because:  Labs out of range:    BP Readings from Last 3 Encounters:   04/18/22 (!) 158/102   01/28/22 (!) 206/92   12/14/21 (!) 199/97     Lab Results   Component Value Date    ALT 51 10/18/2021    ALT 42 11/11/2020     AST   Date Value Ref Range Status   10/18/2021 66 (H) 0 - 45 U/L Final   11/11/2020 37 0 - 45 U/L Final

## 2022-05-18 NOTE — TELEPHONE ENCOUNTER
Received call from patient requesting refill  Oxycodone 30 mg    Last dispensed from pharmacy on 5/6/22-7 days    Patient's last office/virtual visit by prescribing provider on 4/13/22  Next office/virtual appointment scheduled for 6/17/22    UDT/CSA = 12/14/2021    oxyCODONE IR (ROXICODONE) 30 MG tablet 42 tablet 0 5/11/2022  No   Sig - Route: Take 1 tablet (30 mg) by mouth every 4 hours as needed for severe pain Fill 5/13/22, start 5/17/22 - Oral   Sent to pharmacy as: oxyCODONE HCl 30 MG Oral Tablet (ROXICODONE)   Class: E-Prescribe   Earliest Fill Date: 5/11/2022   Order: [id number]   E-Prescribing Status: Receipt confirmed by pharmacy (5/11/2022  2:41 PM CDT)

## 2022-05-19 RX ORDER — CELECOXIB 200 MG/1
CAPSULE ORAL
Qty: 60 CAPSULE | Refills: 0 | OUTPATIENT
Start: 2022-05-19

## 2022-05-19 NOTE — TELEPHONE ENCOUNTER
Attempted to call, left vm asking pt to please call back so we can help get her scheduled with PCP.     Thanks,  CASSIDY Iglesias  [facility]

## 2022-05-20 RX ORDER — OXYCODONE HYDROCHLORIDE 30 MG/1
30 TABLET ORAL EVERY 4 HOURS PRN
Qty: 42 TABLET | Refills: 0 | Status: SHIPPED | OUTPATIENT
Start: 2022-05-20 | End: 2022-05-27

## 2022-05-20 RX ORDER — OXYCODONE HYDROCHLORIDE 30 MG/1
30 TABLET ORAL EVERY 4 HOURS PRN
Qty: 42 TABLET | Refills: 0 | Status: SHIPPED | OUTPATIENT
Start: 2022-05-20 | End: 2022-05-20

## 2022-05-20 NOTE — TELEPHONE ENCOUNTER
Team, please assist patient with scheduling.     Nena Jones RN   [facility]

## 2022-05-20 NOTE — TELEPHONE ENCOUNTER
Next refill request:  Oxycodone 30 mg  This is only a 7 day refill.  Pending Prescriptions:                       Disp   Refills    oxyCODONE IR (ROXICODONE) 30 MG tablet    42 tab*0            Sig: Take 1 tablet (30 mg) by mouth every 4 hours as           needed for severe pain Dispense 5/23/22 for start           on or after 5/24/22    Cub

## 2022-05-20 NOTE — TELEPHONE ENCOUNTER
Pharmacy calls, spoke with pharmacist and patient has been expecting to  refills 4 days early even since changing to a 7 day refill.  Last refill was dated ok to  5/13/22 however the start date was 5/17/22.  After review of the , the last several refills have been picked up consistently on the 7th day so the dispense date of 5/13/22 for start date 5/17/22 was incorrect.    Pending Prescriptions:                       Disp   Refills    oxyCODONE IR (ROXICODONE) 30 MG tablet    42 tab*0            Sig: Take 1 tablet (30 mg) by mouth every 4 hours as           needed for severe pain Dispense/start 5/20/22    Signed Prescriptions:                        Disp   Refills    oxyCODONE IR (ROXICODONE) 30 MG tablet     42 tab*0        Sig: Take 1 tablet (30 mg) by mouth every 4 hours as           needed for severe pain Dispense 5/23/22 for start           on or after 5/24/22  Authorizing Provider: ADRI DIXON

## 2022-05-26 ENCOUNTER — TELEPHONE (OUTPATIENT)
Dept: PALLIATIVE MEDICINE | Facility: OTHER | Age: 62
End: 2022-05-26
Payer: COMMERCIAL

## 2022-05-26 DIAGNOSIS — D47.02 MAST CELL DISEASE, SYSTEMIC: Chronic | ICD-10-CM

## 2022-05-26 DIAGNOSIS — G89.4 CHRONIC PAIN DISORDER: ICD-10-CM

## 2022-05-26 RX ORDER — CYCLOBENZAPRINE HCL 10 MG
10 TABLET ORAL 3 TIMES DAILY PRN
Qty: 270 TABLET | Refills: 1 | Status: SHIPPED | OUTPATIENT
Start: 2022-05-26 | End: 2022-06-17

## 2022-05-26 RX ORDER — KETAMINE HCL 100 %
POWDER (GRAM) MISCELLANEOUS
Qty: 120 G | Refills: 3 | Status: SHIPPED | OUTPATIENT
Start: 2022-05-26 | End: 2022-06-17

## 2022-05-26 RX ORDER — FAMOTIDINE 20 MG/1
20 TABLET, FILM COATED ORAL 2 TIMES DAILY
Qty: 180 TABLET | Refills: 1 | Status: SHIPPED | OUTPATIENT
Start: 2022-05-26 | End: 2023-04-17

## 2022-05-26 RX ORDER — HYDROXYZINE HYDROCHLORIDE 25 MG/1
TABLET, FILM COATED ORAL
Qty: 270 TABLET | Refills: 3 | Status: SHIPPED | OUTPATIENT
Start: 2022-05-26 | End: 2022-12-06

## 2022-05-26 NOTE — TELEPHONE ENCOUNTER
Is it ok for pt to increase Ketamine to 1.5 to 2 troches at hs?     Non-opioid medications queued to 90 days    Pending Prescriptions:                       Disp   Refills    hydrOXYzine (ATARAX) 25 MG tablet         270 ta*3            Sig: One to two tabs every six hours for itching. This           is a 90 day supply    famotidine (PEPCID) 20 MG tablet          180 ta*1            Sig: Take 1 tablet (20 mg) by mouth 2 times daily This           is a 90 day supply    cyclobenzaprine (FLEXERIL) 10 MG tablet   270 ta*1            Sig: Take 1 tablet (10 mg) by mouth 3 times daily as           needed for muscle spasms This is a 90 day supply

## 2022-05-26 NOTE — TELEPHONE ENCOUNTER
Pt calls stating her  will be losing his insurance soon and is asking if she can have a 90 days supply of medications prescribed.   Also is requesting an increase in Ketamine to 1 1/2 to 2 troches at HS    Please advise

## 2022-05-27 DIAGNOSIS — R07.1 CHEST PAIN ON BREATHING: ICD-10-CM

## 2022-05-27 RX ORDER — OXYCODONE HYDROCHLORIDE 30 MG/1
30 TABLET ORAL EVERY 4 HOURS PRN
Qty: 84 TABLET | Refills: 0 | Status: SHIPPED | OUTPATIENT
Start: 2022-05-27 | End: 2022-06-03

## 2022-05-27 RX ORDER — OXYCODONE HYDROCHLORIDE 30 MG/1
30 TABLET ORAL EVERY 4 HOURS PRN
Qty: 42 TABLET | Refills: 0 | Status: SHIPPED | OUTPATIENT
Start: 2022-05-31 | End: 2022-05-27

## 2022-05-27 NOTE — TELEPHONE ENCOUNTER
Received call from patient requesting refill  Oxycodone: last dispense date of 5/20/22-7 days    Patient's last office/virtual visit by prescribing provider on 4/13/22  Next office/virtual appointment scheduled for 6/17/22    UDT/CSA = 12/14/2021    Pending Prescriptions:                       Disp   Refills    oxyCODONE IR (ROXICODONE) 30 MG tablet    42 tab*0            Sig: Take 1 tablet (30 mg) by mouth every 4 hours as           needed for severe pain Dispense/start 5/27/22    Cub

## 2022-06-03 DIAGNOSIS — R07.1 CHEST PAIN ON BREATHING: ICD-10-CM

## 2022-06-03 RX ORDER — OXYCODONE HYDROCHLORIDE 30 MG/1
30 TABLET ORAL EVERY 4 HOURS PRN
Qty: 84 TABLET | Refills: 0 | Status: SHIPPED | OUTPATIENT
Start: 2022-06-03 | End: 2022-06-17

## 2022-06-06 ENCOUNTER — TELEPHONE (OUTPATIENT)
Dept: PALLIATIVE MEDICINE | Facility: OTHER | Age: 62
End: 2022-06-06
Payer: COMMERCIAL

## 2022-06-06 NOTE — TELEPHONE ENCOUNTER
Received call from patient requesting refill  Oxycodone 30 mg    Last dispensed from pharmacy on: 5/27/22-14 days     Patient's last office/virtual visit by prescribing provider on 4/13/22  Next office/virtual appointment scheduled for 6/17/22    UDT/CSA = 12/14/2021    oxyCODONE IR (ROXICODONE) 30 MG tablet 84 tablet 0 6/3/2022  No   Sig - Route: Take 1 tablet (30 mg) by mouth every 4 hours as needed for severe pain May fill 6/8 to star t6/10 - Oral   Sent to pharmacy as: oxyCODONE HCl 30 MG Oral Tablet (ROXICODONE)   Class: E-Prescribe   Earliest Fill Date: 6/3/2022   Order: [id number]   E-Prescribing Status: Receipt confirmed by pharmacy (6/3/2022 10:03 PM CDT)

## 2022-06-17 ENCOUNTER — VIRTUAL VISIT (OUTPATIENT)
Dept: PALLIATIVE MEDICINE | Facility: OTHER | Age: 62
End: 2022-06-17
Payer: COMMERCIAL

## 2022-06-17 ENCOUNTER — TELEPHONE (OUTPATIENT)
Dept: PALLIATIVE MEDICINE | Facility: OTHER | Age: 62
End: 2022-06-17

## 2022-06-17 DIAGNOSIS — G89.4 CHRONIC PAIN DISORDER: ICD-10-CM

## 2022-06-17 DIAGNOSIS — R07.1 CHEST PAIN ON BREATHING: ICD-10-CM

## 2022-06-17 DIAGNOSIS — G62.0 DRUG-INDUCED POLYNEUROPATHY (H): ICD-10-CM

## 2022-06-17 PROCEDURE — 99213 OFFICE O/P EST LOW 20 MIN: CPT | Mod: GT | Performed by: ANESTHESIOLOGY

## 2022-06-17 RX ORDER — CYCLOBENZAPRINE HCL 10 MG
10 TABLET ORAL 4 TIMES DAILY
Qty: 360 TABLET | Refills: 1 | Status: SHIPPED | OUTPATIENT
Start: 2022-06-17 | End: 2023-01-16

## 2022-06-17 RX ORDER — KETAMINE HCL 100 %
POWDER (GRAM) MISCELLANEOUS
Qty: 90 G | Refills: 3 | Status: SHIPPED | OUTPATIENT
Start: 2022-06-17 | End: 2023-06-19

## 2022-06-17 RX ORDER — OXYCODONE HYDROCHLORIDE 30 MG/1
30 TABLET ORAL EVERY 4 HOURS PRN
Qty: 84 TABLET | Refills: 0 | Status: SHIPPED | OUTPATIENT
Start: 2022-06-17 | End: 2022-06-30

## 2022-06-17 RX ORDER — OXYCODONE HYDROCHLORIDE 30 MG/1
30 TABLET ORAL EVERY 4 HOURS PRN
Qty: 84 TABLET | Refills: 0 | Status: SHIPPED | OUTPATIENT
Start: 2022-06-17 | End: 2022-06-17

## 2022-06-17 RX ORDER — KETAMINE HCL 100 %
POWDER (GRAM) MISCELLANEOUS
Qty: 120 G | Refills: 3 | Status: SHIPPED | OUTPATIENT
Start: 2022-06-17 | End: 2022-12-14

## 2022-06-17 ASSESSMENT — PAIN SCALES - GENERAL: PAINLEVEL: EXTREME PAIN (8)

## 2022-06-17 NOTE — PROGRESS NOTES
"[facility] - Office Visit    ASSESSMENT & PLAN     Tisha was seen today for pain.    Diagnoses and all orders for this visit:    Chronic pain disorder  -     cyclobenzaprine (FLEXERIL) 10 MG tablet; Take 1 tablet (10 mg) by mouth 4 times daily This is a 90 day supply  -     ketamine HCl POWD; 120 mg lozenge one-half to one lozenge up to 4 times a day as needed, #120    Chest pain on breathing  -     Discontinue: oxyCODONE IR (ROXICODONE) 30 MG tablet; Take 1 tablet (30 mg) by mouth every 4 hours as needed for severe pain May fill 6/22 to start 6/24    Drug-induced polyneuropathy (H)  -     ketamine HCl POWD; Ketamine 10% and lidocaine 10% in topical cream, apply to feet tid , 90 gm tube        There are no Patient Instructions on file for this visit.      -----  ADRI DIXON MD  [facility]       SUBJECTIVE      Tisha Arias is a 61 year old year old female who presents to clinic today for the following:     Follow-up for neuropathic pain after chemotherapy and radiation.    Reviews today \"not the greatest\".  She is noted with the pain, increased sensation of pulling from her shoulders into her chest cracked into a vice\".  This may have flared up after getting her vaccine booster.  This is occurred in the past, has taken a long time before it ever goes away.  Cannot has been involving her hips and chest with some change in breathing.    Previously used acupuncture and massage with limited benefit.  She had been doing frequency [employer] microcurrent with perhaps some benefit but can no longer afford.    Her  is retired.  She is trying to find insurance.  Given her pre-existing condition of 3 cancers in 5 years she is having challenges.    Reviewed muscle relaxants we have tried.  Baclofen led to swelling, tizanidine was not tolerated.  Has been using Flexeril 10 mg 3 times a day which seems to help.  She does not want to add more unusual medications and " wonder about increasing to 4 times a day.    Using ketamine 1 and 20 mg 4 times a day lozenges and a topical cream perhaps some benefit.  Is concerned with the cost, we have been getting them at [facility] pharmacy, does not appear tried the topical cream through [facility] which be relatively less expensive.    Has been using the oxycodone 30 mg every 4 hours.   reviewed.  Urine drug test in October.  She asks about increasing perhaps another dose that she could use a half a tablet twice through the day.  I reviewed with changes in staffing with the [facility] pain services, review of focus toward the 90 morphine milligram equivalents.  We discussed when she first came to this practice had been on lower doses through variety of other treatment settings several times a did not do as well.  We also looked at tapering this fall and the visit in December when a friend came and that had been involved with her through the years, observing how she had done very poorly, struggled to look worse and function and discussed this dose.  Reviewed will need to look into ways to  changes.  Buprenorphine led to itching and chest pain.  We have tried agents such as Namenda and lamotrigine.  Has not used Riluzole. Again noted concern for adding in new medications at this time.    Cannot afford medical cannabis.    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      Current Outpatient Medications:      ACAI RAMIREZ PO, Take 50 mg by mouth, Disp: , Rfl:      acetaminophen (TYLENOL) 325 MG tablet, Take 2 tablets (650 mg) by mouth every 4 hours as needed for mild pain or fever, Disp: 100 tablet, Rfl:      albuterol (VENTOLIN HFA) 108 (90 Base) MCG/ACT inhaler, Inhale 1-2 puffs into the lungs every 4 hours as needed for shortness of breath / dyspnea or wheezing, Disp: 18 g, Rfl: 11     calcitRIOL (ROCALTROL) 0.25 MCG capsule,  take 2 capsules (0.5 mcg) by mouth in the AM and 1 capsule (0.25 mcg) in the PM. Virtual/video visit requested., Disp: 270 capsule, Rfl: 4     calcium citrate-vitamin D (CALCIUM CITRATE + D3) 315-250 MG-UNIT TABS per tablet, Take 2 tablets by mouth 2 times daily, Disp: 60 tablet, Rfl: 2     celecoxib (CELEBREX) 200 MG capsule, Take 1 capsule (200 mg) by mouth TWO times daily, Disp: 60 capsule, Rfl: 3     cetirizine (ZYRTEC ALLERGY) 10 MG tablet, Take 1 tablet (10 mg) by mouth 3 times daily On hold for lab test., Disp: 90 tablet, Rfl: 0     Cholecalciferol (VITAMIN D3) 50 MCG (2000 UT) CAPS, Take 2,000 Units by mouth daily, Disp: 90 capsule, Rfl: 1     cromolyn (OPTICROM) 4 % ophthalmic solution, Place 1 drop into both eyes 4 times daily, Disp: 10 mL, Rfl: 0     cromolyn sodium (NASALCROM) 5.2 MG/ACT nasal aerosol, SPRAY ONE SPRAY( 1 ML) IN NOSTRIL DAILY, Disp: 1 mL, Rfl: 3     cyclobenzaprine (FLEXERIL) 10 MG tablet, Take 1 tablet (10 mg) by mouth 4 times daily This is a 90 day supply, Disp: 360 tablet, Rfl: 1     cyclobenzaprine (FLEXERIL) 10 MG tablet, Take 1 tablet (10 mg) by mouth 3 times daily as needed for muscle spasms, Disp: 30 tablet, Rfl: 0     diclofenac (VOLTAREN) 1 % topical gel, Apply affected area two times daily as needed using enclosed dosing card. (2-4 grams to chest wall), Disp: 100 g, Rfl: 1     famotidine (PEPCID) 20 MG tablet, Take 1 tablet (20 mg) by mouth 2 times daily This is a 90 day supply, Disp: 180 tablet, Rfl: 1     fluticasone (FLONASE) 50 MCG/ACT nasal spray, Spray 1-2 sprays into both nostrils daily, Disp: 16 g, Rfl: 2     hydrOXYzine (ATARAX) 25 MG tablet, One to two tabs every six hours for itching. This is a 90 day supply, Disp: 270 tablet, Rfl: 3     ketamine HCl POWD, Ketamine 10% and lidocaine 10% in topical cream, apply to feet tid , 90 gm tube, Disp: 90 g, Rfl: 3     ketamine HCl POWD, 120 mg lozenge one-half to one lozenge up to 4 times a day as needed, #120, Disp: 120 g,  Rfl: 3     KLOR-CON 20 MEQ CR tablet, TAKE ONE TABLET BY MOUTH TWICE DAILY, Disp: 60 tablet, Rfl: 0     levothyroxine (SYNTHROID/LEVOTHROID) 112 MCG tablet, TAKE 2 TABLETS (224 MCG) BY MOUTH DAILY, Disp: 180 tablet, Rfl: 4     lidocaine (XYLOCAINE) 5 % external ointment, Apply quarter size amount to chest and back up to 3 times daily as needed for pain., Disp: 350 g, Rfl: 1     lidocaine-prilocaine (EMLA) cream, Apply topically as needed for moderate pain, Disp: 60 g, Rfl: 1     magnesium oxide (MAG-OX) 400 MG tablet, Take 400 mg by mouth 3 times daily, Disp: , Rfl:      Menaquinone-7 (VITAMIN K2) 100 MCG CAPS, Take 1 capsule by mouth 2 times daily, Disp: 60 capsule, Rfl: 3     montelukast (SINGULAIR) 10 MG tablet, TAKE TWO TABLETS BY MOUTH TWICE DAILY , Disp: 120 tablet, Rfl: 0     naloxone (NARCAN) nasal spray, Spray 1 spray (4 mg) into one nostril alternating nostrils as needed for opioid reversal every 2-3 minutes until assistance arrives, Disp: 0.2 mL, Rfl: 0     olopatadine (PATANOL) 0.1 % ophthalmic solution, Apply 1 drop to eye, Disp: , Rfl:      omeprazole (PRILOSEC) 10 MG DR capsule, Take 2 capsules (20 mg) by mouth daily, Disp: 30 capsule, Rfl: 1     Probiotic Product (PROBIOTIC DAILY PO), Take 1 capsule by mouth daily Lacto acid bifidobacterium, Disp: , Rfl:      ranitidine (ZANTAC) 75 MG tablet, Take 1 tablet (75 mg) by mouth 3 times daily, Disp: 90 tablet, Rfl: 3     SUMAtriptan (IMITREX) 50 MG tablet, Take 1 tablet by mouth as needed for migraine . May repeat dose in 2 hours as needed. Maximum dose 4 tablets in 24 hours, Disp: 9 tablet, Rfl: 11     tobramycin (TOBREX) 0.3 % ophthalmic solution, ADMINISTER 1 2 DROPS TO THE RIGHT EYE EVERY 4 (FOUR) HOURS FOR 7 DAYS., Disp: , Rfl:      tobramycin-dexamethasone (TOBRADEX) 0.3-0.1 % ophthalmic suspension, INSTILL ONE DROP INTO BOTH EYES THREE TIMES DAILY, Disp: , Rfl:      triamcinolone (KENALOG) 0.025 % ointment, Apply topically as needed, Disp: , Rfl:  3     UNABLE TO FIND, Take 2 capsules by mouth 3 times daily Muscle Mag. 2 caps contain B1 20mg, B2 20mg, B6 10mg, magesium 20mg, manganese 2mg., Disp: , Rfl:      UNABLE TO FIND, 3 tablets 3 times daily MEDICATION NAME: calcium D-Glucarate  3 caps contain 180mg of elemental calcium., Disp: , Rfl:      UNABLE TO FIND, 1 tablet daily MEDICATION NAME: Pure Encapsulations, Disp: , Rfl:      furosemide (LASIX) 20 MG tablet, Take 1 tablet (20 mg) by mouth 2 times daily (Patient not taking: No sig reported), Disp: 90 tablet, Rfl: 3     ketorolac (TORADOL) 30 MG/ML injection, Inject 1 mL (30 mg) into the muscle every 6 hours as needed for moderate pain (Patient not taking: Reported on 6/17/2022), Disp: 4 mL, Rfl: 3     oxyCODONE IR (ROXICODONE) 30 MG tablet, Take 1 tablet (30 mg) by mouth every 4 hours as needed for severe pain May fill 6/24 and start 6/28/22., Disp: 84 tablet, Rfl: 0     Triamcinolone Acetonide (AZMACORT IN), Inhale 2 puffs into the lungs as needed (Patient not taking: Reported on 6/17/2022), Disp: , Rfl:       Review of Systems   General, psych, musculoskeletal, bowels and bladder otherwise normal other than above.          OBJECTIVE   BP (!) 171/102   Pulse 106   Ht 1.829 m (6')   Wt 103.8 kg (228 lb 14.4 oz)   SpO2 97%   BMI 31.04 kg/m        Physical Exam  General: Alert, constricted affect.  No significant pain behavior.  Cardiovascular: Normal rate  Lungs: Pulmonary effort is normal, speaking in full sentences  MSK:   Skin: Warm and dry. No concerning rashes or lesions.  Neurologic: No focal deficit, alert and oriented x3  Psychiatric: Psychomotor retarded  Assessment: Neuropathic pain, reviewed since chemotherapy and radiation.  Has had several dose tapers over the years, has function relatively best at this time.        Plan: Increase the Flexeril to 10 mg 4 times a day.    Will try different compounding pharmacy to see if the ketamine lozenges and topical cream relatively less expensive.  We  will continue with the oxycodone 30 mg immediate release every 4 hours, discussed we will continue to need to address strategy toward decreasing this dosage.    Total time more than 20 minutes    Video start time 10: 45.  Video end time 11: 03.  Patient at home via Doximity

## 2022-06-17 NOTE — TELEPHONE ENCOUNTER
Call from [facility] pharmacist reporting pt has been disputing start of Oxycodone scripts.  Scripts are always refilled on Fridays but start date has been incorrect.   States the most recent Oxycodone script should be sent to fill 6/24 and start 6/28.    Will reorder with correct dates and cancel the script that has been sent to dispense 6/22 and start 6/24.  [facility] pharmacy  Pending Prescriptions:                       Disp   Refills    oxyCODONE IR (ROXICODONE) 30 MG tablet    84 tab*0            Sig: Take 1 tablet (30 mg) by mouth every 4 hours as           needed for severe pain May fill 6/24 and start           6/28/22.

## 2022-06-17 NOTE — NURSING NOTE
Patient presents to the clinic today for a follow up with ADRI DIXON MD regarding Pain Management.        Elvin is a 61 year old who is being evaluated via a billable video visit.      How would you like to obtain your AVS? Mail a copy  If the video visit is dropped, the invitation should be resent by: Text to cell phone: [phone]   Will anyone else be joining your video visit? No        Video-Visit Details    Video Start Time:     Type of service:  Video Visit    Video End Time:    Originating Location (pt. Location): Home    Distant Location (provider location):  [facility]     Platform used for Video Visit: Doximity            Is Pt currently in [place]? Yes        NOTE: If Pt is not in [place], Appointment needs to be canceled and rescheduled          {Rooming staff  Please complete the PEG Assessment  Assess Pain, Function, and Quality of Life. Complete every pain visit :[id number]  PEG Score 12/14/2021 1/28/2022 6/17/2022   PEG Total Score 9 4.67 8             UDT/CSA = 12/14/2021              QUESTIONS:   trouble finding opticrom    Would like to go back on dieretic    Imitrex increase?            Ginette Ford  [facility] Patient Facilitator

## 2022-06-30 DIAGNOSIS — R07.1 CHEST PAIN ON BREATHING: ICD-10-CM

## 2022-06-30 RX ORDER — OXYCODONE HYDROCHLORIDE 30 MG/1
30 TABLET ORAL EVERY 4 HOURS PRN
Qty: 84 TABLET | Refills: 0 | Status: SHIPPED | OUTPATIENT
Start: 2022-06-30 | End: 2022-07-18

## 2022-07-06 ENCOUNTER — TELEPHONE (OUTPATIENT)
Dept: PALLIATIVE MEDICINE | Facility: OTHER | Age: 62
End: 2022-07-06

## 2022-07-30 DIAGNOSIS — G43.519 INTRACTABLE PERSISTENT MIGRAINE AURA WITHOUT CEREBRAL INFARCTION AND WITHOUT STATUS MIGRAINOSUS: ICD-10-CM

## 2022-08-01 RX ORDER — SUMATRIPTAN 50 MG/1
TABLET, FILM COATED ORAL
Qty: 9 TABLET | Refills: 0 | Status: SHIPPED | OUTPATIENT
Start: 2022-08-01 | End: 2022-09-19

## 2022-08-01 NOTE — TELEPHONE ENCOUNTER
Routing refill request to provider for review/approval because:  Drug not on the [facility] refill protocol   BP Readings from Last 3 Encounters:   04/18/22 (!) 158/102   01/28/22 (!) 206/92   12/14/21 (!) 199/97

## 2022-08-09 PROBLEM — F11.90 CHRONIC, CONTINUOUS USE OF OPIOIDS: Status: ACTIVE | Noted: 2022-08-09

## 2022-08-11 NOTE — TELEPHONE ENCOUNTER
Routing refill request to provider for review/approval because:  Drug not on the [facility] refill protocol     Requested Prescriptions   Pending Prescriptions Disp Refills     cyclobenzaprine (FLEXERIL) 5 MG tablet  Last Written Prescription Date:  2/25/19  Last Fill Quantity: 90,  # refills: 0   Last office visit: 5/24/2019 with prescribing provider:     Future Office Visit:   90 tablet 0     Sig: Take 1-2 tablets (5-10 mg) by mouth 2 times daily as needed for muscle spasms       There is no refill protocol information for this order        Mary Wilkerson, CASSIDY - BC       Cibinqo Pregnancy And Lactation Text: It is unknown if this medication will adversely affect pregnancy or breast feeding.  You should not take this medication if you are currently pregnant or planning a pregnancy or while breastfeeding.

## 2022-08-26 DIAGNOSIS — R07.1 CHEST PAIN ON BREATHING: ICD-10-CM

## 2022-08-26 RX ORDER — OXYCODONE HYDROCHLORIDE 30 MG/1
30 TABLET ORAL EVERY 4 HOURS PRN
Qty: 84 TABLET | Refills: 0 | Status: SHIPPED | OUTPATIENT
Start: 2022-08-26 | End: 2022-09-15

## 2022-08-30 ENCOUNTER — TELEPHONE (OUTPATIENT)
Dept: PALLIATIVE MEDICINE | Facility: OTHER | Age: 62
End: 2022-08-30

## 2022-08-30 NOTE — TELEPHONE ENCOUNTER
[facility] Call Center    Phone Message    May a detailed message be left on voicemail: yes     Reason for Call: Other: [facility] called with questions regarding the patient's oxycodone prescription. Please call back when available.      Action Taken: Other: Pain    Travel Screening: Not Applicable

## 2022-08-30 NOTE — TELEPHONE ENCOUNTER
RN spoke to pharmacist and pharmacist was wondering if there is a reason why they would need to dispense the Oxycodone on Thursday rather than the usually Friday.  After reviewing the notes RN does not see anything noted as to why the  date was 9/1/22 instead of 9/2/22. Pharmacist said the patient is not requesting so they will plan on filling it on Friday as usual unless the patient calls beforehand.  Pharmacist also stated they might not have the medication until Friday anyway so if the patient calls and needs it Thursday then we would most likely have to resend the Rx to somewhere else.  Pharmacist will call if this is the case.

## 2022-08-31 ENCOUNTER — TELEPHONE (OUTPATIENT)
Dept: PALLIATIVE MEDICINE | Facility: OTHER | Age: 62
End: 2022-08-31

## 2022-08-31 NOTE — TELEPHONE ENCOUNTER
[facility] Call Center    Phone Message    May a detailed message be left on voicemail: yes     Reason for Call: Medication Refill Request    Has the patient contacted the pharmacy for the refill? Yes   Name of medication being requested: oxyCODONE IR (ROXICODONE) 30 MG tablet  Provider who prescribed the medication: Thanh Berry MD  Pharmacy: [id number] - [place], [place] - [address] [place]  Date medication is needed: 9/2/2022     Action Taken: Other: [facility] PA[facility]     Travel Screening: Not Applicable

## 2022-08-31 NOTE — TELEPHONE ENCOUNTER
Received call from patient requesting refill(s) of Oxycodone     Last dispensed from pharmacy on 8/19/22    Patient's last office/virtual visit by prescribing provider on 6/17/22  Next office/virtual appointment scheduled for 9/9/22    Last urine drug screen date 12/2021  Current opioid agreement on file (completed within the last year) Yes Date of opioid agreement: 12/2021    E-prescribe to [facility] pharmacy  Already at pharmacy

## 2022-09-06 DIAGNOSIS — Z91.018 TREE NUT ALLERGY: ICD-10-CM

## 2022-09-06 RX ORDER — MONTELUKAST SODIUM 10 MG/1
TABLET ORAL
Qty: 120 TABLET | Refills: 0 | OUTPATIENT
Start: 2022-09-06

## 2022-09-07 DIAGNOSIS — R07.89 CHEST WALL PAIN: Primary | ICD-10-CM

## 2022-09-07 RX ORDER — MAGNESIUM OXIDE 400 MG/1
400 TABLET ORAL 3 TIMES DAILY
Qty: 90 TABLET | Refills: 4 | Status: SHIPPED | OUTPATIENT
Start: 2022-09-07 | End: 2023-03-10

## 2022-09-07 NOTE — TELEPHONE ENCOUNTER
Received fax request from [facility] pharmacy requesting refill(s) for Magnesium Oxide 400 mg tablet    Last refilled on 7/23/2022    Pt last seen on 6/17/2022  Next appt scheduled for 9/9/2022    Maine Ryan LPN  [facility]

## 2022-09-09 DIAGNOSIS — R07.89 CHEST WALL PAIN: ICD-10-CM

## 2022-09-09 RX ORDER — CELECOXIB 200 MG/1
CAPSULE ORAL
Qty: 60 CAPSULE | Refills: 3 | Status: SHIPPED | OUTPATIENT
Start: 2022-09-09 | End: 2022-12-13

## 2022-09-09 NOTE — TELEPHONE ENCOUNTER
Received fax request from [facility] pharmacy requesting refill(s) for Celebrex     Last refilled on 4/20/2022    Pt last seen on 6/17/2022  Next appt scheduled for 9/20/2022    Maine Ryan LPN  [facility]

## 2022-09-14 DIAGNOSIS — J45.20 MILD INTERMITTENT ASTHMA WITHOUT COMPLICATION: ICD-10-CM

## 2022-09-14 DIAGNOSIS — C77.0 METASTASIS TO CERVICAL LYMPH NODE (H): ICD-10-CM

## 2022-09-14 DIAGNOSIS — E89.0 POSTSURGICAL HYPOTHYROIDISM: ICD-10-CM

## 2022-09-14 DIAGNOSIS — C73 PAPILLARY CARCINOMA, FOLLICULAR VARIANT (H): ICD-10-CM

## 2022-09-14 RX ORDER — ALBUTEROL SULFATE 90 UG/1
1-2 AEROSOL, METERED RESPIRATORY (INHALATION) EVERY 4 HOURS PRN
Qty: 18 G | Refills: 0 | Status: SHIPPED | OUTPATIENT
Start: 2022-09-14 | End: 2022-10-13

## 2022-09-15 DIAGNOSIS — R07.1 CHEST PAIN ON BREATHING: ICD-10-CM

## 2022-09-15 RX ORDER — OXYCODONE HYDROCHLORIDE 30 MG/1
30 TABLET ORAL EVERY 4 HOURS PRN
Qty: 84 TABLET | Refills: 0 | Status: SHIPPED | OUTPATIENT
Start: 2022-09-20 | End: 2022-09-20

## 2022-09-15 NOTE — TELEPHONE ENCOUNTER
[facility] Call Center    Phone Message    May a detailed message be left on voicemail: yes     Reason for Call: Medication Refill Request    Has the patient contacted the pharmacy for the refill? Yes   Name of medication being requested: oxyCODONE IR (ROXICODONE) 30 MG tablet  Provider who prescribed the medication: Dr. Berry  Pharmacy: [id number] - [place], [place] - [address] [place]  Date medication is needed: 09/16          Action Taken: Message routed to:  Other: [facility] Pain    Travel Screening: Not Applicable

## 2022-09-15 NOTE — TELEPHONE ENCOUNTER
Received call from patient requesting refill(s) of Oxycodone     Last dispensed from pharmacy on 9/2/22    Patient's last office/virtual visit by prescribing provider on 6/17/22  Next office/virtual appointment scheduled for 9/20/22 9/9/22 Cancelled d/t lack of transportation  9/2/22 Provider cancelled appointment    Last urine drug screen date 12/2021  Current opioid agreement on file (completed within the last year) Yes Date of opioid agreement: 12/2021    E-prescribe to [facility] pharmacy  Pending Prescriptions:                       Disp   Refills    oxyCODONE IR (ROXICODONE) 30 MG tablet    84 tab*0            Sig: Take 1 tablet (30 mg) by mouth every 4 hours as           needed for severe pain May fill 9/16 for 9/20

## 2022-09-16 RX ORDER — CALCITRIOL 0.25 UG/1
CAPSULE, LIQUID FILLED ORAL
Qty: 270 CAPSULE | Refills: 4 | Status: SHIPPED | OUTPATIENT
Start: 2022-09-16

## 2022-09-16 NOTE — TELEPHONE ENCOUNTER
Calcitriol Oral Capsule 0.25 MCG    take 2 capsules (0.5 mcg) by mouth in the AM and 1 capsule (0.25 mcg) in the PM.    Last Written Prescription Date:  9/2/2021  Last Fill Quantity: 270,   # refills: 4  Last Office Visit : 11/20/2020  Future Office visit:  None    Routing refill request to provider for review/approval because:  Protocol.  Last visit 11/20/20  Last TSH,T4, T3, Zinc, B12,Magnesium, Ionized CA++, PTH done 10/18/2021

## 2022-09-20 ENCOUNTER — OFFICE VISIT (OUTPATIENT)
Dept: PALLIATIVE MEDICINE | Facility: OTHER | Age: 62
End: 2022-09-20
Payer: COMMERCIAL

## 2022-09-20 VITALS — DIASTOLIC BLOOD PRESSURE: 86 MMHG | SYSTOLIC BLOOD PRESSURE: 188 MMHG | HEART RATE: 121 BPM

## 2022-09-20 DIAGNOSIS — R07.1 CHEST PAIN ON BREATHING: ICD-10-CM

## 2022-09-20 PROCEDURE — G0463 HOSPITAL OUTPT CLINIC VISIT: HCPCS

## 2022-09-20 PROCEDURE — 99213 OFFICE O/P EST LOW 20 MIN: CPT | Performed by: ANESTHESIOLOGY

## 2022-09-20 RX ORDER — OXYCODONE HYDROCHLORIDE 30 MG/1
30 TABLET ORAL EVERY 4 HOURS PRN
Qty: 84 TABLET | Refills: 0 | Status: SHIPPED | OUTPATIENT
Start: 2022-09-20 | End: 2022-10-13

## 2022-09-20 ASSESSMENT — PAIN SCALES - GENERAL: PAINLEVEL: MODERATE PAIN (5)

## 2022-09-20 NOTE — PROGRESS NOTES
[facility] - Office Visit    ASSESSMENT & PLAN     Diagnoses and all orders for this visit:    Chest pain on breathing  -     oxyCODONE IR (ROXICODONE) 30 MG tablet; Take 1 tablet (30 mg) by mouth every 4 hours as needed for severe pain May fill 9/30 for 10/4        Patient Instructions     [facility] Number:  [phone]    Call with any questions about your care and for scheduling assistance.     Calls are returned Monday through Friday between 8 AM and 4:30 PM. We usually get back to you within 2 business days depending on the issue/request.    If we are prescribing your medications:    For opioid medication refills, call the clinic or send a Next Caller message 7 days in advance.  Please include:    Name of requested medication    Name of the pharmacy.    For non-opioid medications, call your pharmacy directly to request a refill. Please allow 3-4 days to be processed.     Per [place] State Law:    All controlled substance prescriptions must be filled within 30 days of being written.      For those controlled substances allowing refills, pickup must occur within 30 days of last fill.      We believe regular attendance is key to your success in our program!      Any time you are unable to keep your appointment we ask that you call us at least 24 hours in advance to cancel.This will allow us to offer the appointment time to another patient.     Multiple missed appointments may lead to dismissal from the clinic.           PLAN:    You will discuss your lower extremity edema with your primary care provider and your oncologist.    You will discuss with the compounding pharmacy a different flavor of ketamine to see if is more tolerable.    May have an evaluation at the community dental clinic to see if they develop a plan that is less expensive for your dental needs.    Continue the Oxycodone 30 mg every 4 hours.    Follow-up with Dr. 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    She reviews concerns of lymphedema has been moving up to her thighs.  At times her feet have turned purple .  Reports her oncologists are aware.  She has read that it could be related to tamoxifen.  In 2012 was at a lymphedema clinic for her breasts.    She has had some troubles with orthopnea, usually sleeps at 45 degrees.  She is an appointment again with the oncologist in November.  Has not reviewed this with her primary care provider.  There are times where she is more short of breath and is tired.  Using more inhalers.    Her  again may be retiring and has not been communicating with her about options for continue with insurance which is stressing.    She has been trying the ketamine, has found this flavor of lozenges is very intolerable sometimes vomits.  She will go by there today to see if there is another flavor that can be used.    She is looking for work, last worked in [employer].  Would like to try to do something virtually.    Has been working with dentist, told that she needs to have repairs that may be worth 60,000.    She does continue with oxycodone 30 mg every 4 hours.  Son is its hard to control the pain even then.  She tries to do relaxation exercises and stretching.  Pain is been mainly in her hips, into her groin.    Did have sacroiliac injections 2012 this is different.  Times it feels that her \"bones are on fire\".  Has been using vitamin K2.    MP reviewed.  Last urine drug test in December.      Current Outpatient Medications:      magnesium oxide (MAG-OX) 400 MG tablet, Take 1 tablet (400 mg) by mouth 3 times daily, Disp: 90 tablet, Rfl: 4     oxyCODONE IR (ROXICODONE) 30 MG tablet, Take 1 tablet (30 mg) by mouth every 4 " hours as needed for severe pain May fill 9/30 for 10/4, Disp: 84 tablet, Rfl: 0     ACAI BERRY PO, Take 50 mg by mouth, Disp: , Rfl:      acetaminophen (TYLENOL) 325 MG tablet, Take 2 tablets (650 mg) by mouth every 4 hours as needed for mild pain or fever, Disp: 100 tablet, Rfl:      albuterol (VENTOLIN HFA) 108 (90 Base) MCG/ACT inhaler, Inhale 1-2 puffs into the lungs every 4 hours as needed for shortness of breath / dyspnea or wheezing OFFICE VISIT NEEDED FOR ANY FURTHER REFILLS, Disp: 18 g, Rfl: 0     calcitRIOL (ROCALTROL) 0.25 MCG capsule, TAKE TWO CAPSULES BY MOUTH IN THE MORNING AND TAKE ONE CAPSULE BY MOUTH IN THE EVENING., Disp: 270 capsule, Rfl: 4     calcium citrate-vitamin D (CALCIUM CITRATE + D3) 315-250 MG-UNIT TABS per tablet, Take 2 tablets by mouth 2 times daily, Disp: 60 tablet, Rfl: 2     celecoxib (CELEBREX) 200 MG capsule, Take 1 capsule (200 mg) by mouth TWO times daily, Disp: 60 capsule, Rfl: 3     cetirizine (ZYRTEC ALLERGY) 10 MG tablet, Take 1 tablet (10 mg) by mouth 3 times daily On hold for lab test., Disp: 90 tablet, Rfl: 0     Cholecalciferol (VITAMIN D3) 50 MCG (2000 UT) CAPS, Take 2,000 Units by mouth daily, Disp: 90 capsule, Rfl: 1     cromolyn (OPTICROM) 4 % ophthalmic solution, Place 1 drop into both eyes 4 times daily, Disp: 10 mL, Rfl: 0     cromolyn sodium (NASALCROM) 5.2 MG/ACT nasal aerosol, SPRAY ONE SPRAY( 1 ML) IN NOSTRIL DAILY, Disp: 1 mL, Rfl: 3     cyclobenzaprine (FLEXERIL) 10 MG tablet, Take 1 tablet (10 mg) by mouth 4 times daily This is a 90 day supply, Disp: 360 tablet, Rfl: 1     cyclobenzaprine (FLEXERIL) 10 MG tablet, Take 1 tablet (10 mg) by mouth 3 times daily as needed for muscle spasms, Disp: 30 tablet, Rfl: 0     diclofenac (VOLTAREN) 1 % topical gel, Apply affected area two times daily as needed using enclosed dosing card. (2-4 grams to chest wall), Disp: 100 g, Rfl: 1     famotidine (PEPCID) 20 MG tablet, Take 1 tablet (20 mg) by mouth 2 times daily This  is a 90 day supply, Disp: 180 tablet, Rfl: 1     fluticasone (FLONASE) 50 MCG/ACT nasal spray, Spray 1-2 sprays into both nostrils daily, Disp: 16 g, Rfl: 2     hydrOXYzine (ATARAX) 25 MG tablet, One to two tabs every six hours for itching. This is a 90 day supply, Disp: 270 tablet, Rfl: 3     ketamine HCl POWD, Ketamine 10% and lidocaine 10% in topical cream, apply to feet tid , 90 gm tube, Disp: 90 g, Rfl: 3     ketamine HCl POWD, 120 mg lozenge one-half to one lozenge up to 4 times a day as needed, #120, Disp: 120 g, Rfl: 3     ketorolac (TORADOL) 30 MG/ML injection, Inject 1 mL (30 mg) into the muscle every 6 hours as needed for moderate pain (Patient not taking: Reported on 6/17/2022), Disp: 4 mL, Rfl: 3     KLOR-CON 20 MEQ CR tablet, TAKE ONE TABLET BY MOUTH TWICE DAILY, Disp: 60 tablet, Rfl: 0     levothyroxine (SYNTHROID/LEVOTHROID) 112 MCG tablet, TAKE 2 TABLETS (224 MCG) BY MOUTH DAILY, Disp: 180 tablet, Rfl: 4     lidocaine (XYLOCAINE) 5 % external ointment, Apply quarter size amount to chest and back up to 3 times daily as needed for pain., Disp: 350 g, Rfl: 1     lidocaine-prilocaine (EMLA) cream, Apply topically as needed for moderate pain, Disp: 60 g, Rfl: 1     Menaquinone-7 (VITAMIN K2) 100 MCG CAPS, Take 1 capsule by mouth 2 times daily, Disp: 60 capsule, Rfl: 3     montelukast (SINGULAIR) 10 MG tablet, TAKE TWO TABLETS BY MOUTH TWICE DAILY , Disp: 120 tablet, Rfl: 0     naloxone (NARCAN) nasal spray, Spray 1 spray (4 mg) into one nostril alternating nostrils as needed for opioid reversal every 2-3 minutes until assistance arrives, Disp: 0.2 mL, Rfl: 0     olopatadine (PATANOL) 0.1 % ophthalmic solution, Apply 1 drop to eye, Disp: , Rfl:      omeprazole (PRILOSEC) 10 MG DR capsule, Take 2 capsules (20 mg) by mouth daily, Disp: 30 capsule, Rfl: 1     Probiotic Product (PROBIOTIC DAILY PO), Take 1 capsule by mouth daily Lacto acid bifidobacterium, Disp: , Rfl:      ranitidine (ZANTAC) 75 MG tablet,  Take 1 tablet (75 mg) by mouth 3 times daily, Disp: 90 tablet, Rfl: 3     SUMAtriptan (IMITREX) 50 MG tablet, Take 1 tablet by mouth as needed for migraine . May repeat dose in 2 hours as needed. Maximum dose 4 tablets in 24 hours, Disp: 9 tablet, Rfl: 0     tobramycin (TOBREX) 0.3 % ophthalmic solution, ADMINISTER 1 2 DROPS TO THE RIGHT EYE EVERY 4 (FOUR) HOURS FOR 7 DAYS., Disp: , Rfl:      tobramycin-dexamethasone (TOBRADEX) 0.3-0.1 % ophthalmic suspension, INSTILL ONE DROP INTO BOTH EYES THREE TIMES DAILY, Disp: , Rfl:      triamcinolone (KENALOG) 0.025 % ointment, Apply topically as needed, Disp: , Rfl: 3     Triamcinolone Acetonide (AZMACORT IN), Inhale 2 puffs into the lungs as needed (Patient not taking: Reported on 6/17/2022), Disp: , Rfl:      UNABLE TO FIND, Take 2 capsules by mouth 3 times daily Muscle Mag. 2 caps contain B1 20mg, B2 20mg, B6 10mg, magesium 20mg, manganese 2mg., Disp: , Rfl:      UNABLE TO FIND, 3 tablets 3 times daily MEDICATION NAME: calcium D-Glucarate  3 caps contain 180mg of elemental calcium., Disp: , Rfl:      UNABLE TO FIND, 1 tablet daily MEDICATION NAME: Pure Encapsulations, Disp: , Rfl:       Review of Systems   General, psych, musculoskeletal, bowels and bladder otherwise normal other than above.          OBJECTIVE   BP (!) 188/86 (BP Location: Right arm, Patient Position: Sitting)   Pulse (!) 121   LMP  (LMP Unknown)         Physical Exam  General: Alert, clear sensorium.  Lower extremities indeed do appear edematous.  Is not short of breath.  Cardiovascular: Normal rate  Lungs: Pulmonary effort is normal, speaking in full sentences  M. No concerning rashes or lesions.  Neurologic: No focal deficit, alert and oriented x3  Psychiatric: normal mood and affect, cooperative      Assessment: From neuropathy related to chemotherapy tamoxifen.  This opioid regimen is allowed her to have some fair control and work at times.  Discussed that is much higher than the [facility]  pain services consensus for the safe level of morphine milligram equivalents 90.    Chart reflects using a variety of other agents including buprenorphine which was not tolerated, cannot afford medical cannabis, has been using ketamine.  We have tried oxymorphone and other agents.    Please see the visit this fall from when patient's former therapist came in for a session noting working with her over time and the various doses that were used.    I did discuss with the departments chair that there may be some patients in a situation such as this where after giving good efforts of decreasing with goals toward safety may need to continue at this level until there are other approaches that may emerge.    Patient is going to review concerns with the lower extremity edema and now concerns with orthopnea with primary care provider oncology.    Other plan as above.    Total time more than 25 minutes with moderate complexity decision making

## 2022-09-20 NOTE — PATIENT INSTRUCTIONS
[facility] Number:  [phone]  Call with any questions about your care and for scheduling assistance.   Calls are returned Monday through Friday between 8 AM and 4:30 PM. We usually get back to you within 2 business days depending on the issue/request.    If we are prescribing your medications:  For opioid medication refills, call the clinic or send a Richard Pauer - 3Pt message 7 days in advance.  Please include:  Name of requested medication  Name of the pharmacy.  For non-opioid medications, call your pharmacy directly to request a refill. Please allow 3-4 days to be processed.   Per [place] State Law:  All controlled substance prescriptions must be filled within 30 days of being written.    For those controlled substances allowing refills, pickup must occur within 30 days of last fill.      We believe regular attendance is key to your success in our program!    Any time you are unable to keep your appointment we ask that you call us at least 24 hours in advance to cancel.This will allow us to offer the appointment time to another patient.   Multiple missed appointments may lead to dismissal from the clinic.           PLAN:    You will discuss your lower extremity edema with your primary care provider and your oncologist.    You will discuss with the compounding pharmacy a different flavor of ketamine to see if is more tolerable.    May have an evaluation at the community dental clinic to see if they develop a plan that is less expensive for your dental needs.    Continue the Oxycodone 30 mg every 4 hours.    Follow-up with Dr. Berry in 3 months

## 2022-09-20 NOTE — PROGRESS NOTES
09/20/22 3592   PEG: A Thee-Item Scale Assessing Pain Intensity and Interference        0 = No pain / No interference    10 = Pain as bad as you can imagine / Completely interferes   What number best describes your pain on average in the past week? 5   What number best describes how, during the past week, pain has interfered with your enjoyment of life? 4   What number best describes how, during the past week, pain has interfered with your general activity? 4   PEG Total Score 4.33

## 2022-09-20 NOTE — PROGRESS NOTES
Patient is here for routine pain management visit.  She explains increase in hip pain and bilateral edema in the legs.  Chest pain is different: different area, nipple line and around the side.  Shortness of breath    Patient is trying different options of ketamine to tolerate better.

## 2022-09-22 ENCOUNTER — TELEPHONE (OUTPATIENT)
Dept: FAMILY MEDICINE | Facility: CLINIC | Age: 62
End: 2022-09-22

## 2022-09-22 DIAGNOSIS — G43.519 INTRACTABLE PERSISTENT MIGRAINE AURA WITHOUT CEREBRAL INFARCTION AND WITHOUT STATUS MIGRAINOSUS: ICD-10-CM

## 2022-09-22 RX ORDER — SUMATRIPTAN 50 MG/1
TABLET, FILM COATED ORAL
Qty: 8 TABLET | Refills: 3 | Status: SHIPPED | OUTPATIENT
Start: 2022-09-22 | End: 2022-10-13

## 2022-09-22 NOTE — TELEPHONE ENCOUNTER
Pharmacist calling regarding sumatriptan - they need to know how many  Migraine attacks pt has per month due to insurance coverage.  Per migraine safety protocol, recommends max of 8 tablets per 30 days unless approved by provider. Routing to provider to advise.

## 2022-09-23 NOTE — TELEPHONE ENCOUNTER
Spoke with patient. She states that in the last 3 months she has had roughly 2 migraine headaches per month and typically has to use all 4 tabs in a 24 hour period.     She states that the amount of migraines per month do vary anywhere from 1-3 per month. She used to have more migraines previously.    Notified her that new Rx was sent to pharmacy.    Barbara Alston RN   [facility]

## 2022-10-10 ENCOUNTER — HEALTH MAINTENANCE LETTER (OUTPATIENT)
Age: 62
End: 2022-10-10

## 2022-10-13 ENCOUNTER — TELEPHONE (OUTPATIENT)
Dept: FAMILY MEDICINE | Facility: CLINIC | Age: 62
End: 2022-10-13

## 2022-10-13 ENCOUNTER — OFFICE VISIT (OUTPATIENT)
Dept: FAMILY MEDICINE | Facility: CLINIC | Age: 62
End: 2022-10-13
Payer: COMMERCIAL

## 2022-10-13 VITALS
DIASTOLIC BLOOD PRESSURE: 96 MMHG | TEMPERATURE: 97.9 F | HEIGHT: 65 IN | HEART RATE: 127 BPM | SYSTOLIC BLOOD PRESSURE: 184 MMHG | BODY MASS INDEX: 36.65 KG/M2 | OXYGEN SATURATION: 96 % | WEIGHT: 220 LBS

## 2022-10-13 DIAGNOSIS — Z13.220 ENCOUNTER FOR LIPID SCREENING FOR CARDIOVASCULAR DISEASE: ICD-10-CM

## 2022-10-13 DIAGNOSIS — Z12.11 SCREEN FOR COLON CANCER: Primary | ICD-10-CM

## 2022-10-13 DIAGNOSIS — C85.10 HIGH GRADE B-CELL LYMPHOMA (H): ICD-10-CM

## 2022-10-13 DIAGNOSIS — G43.519 INTRACTABLE PERSISTENT MIGRAINE AURA WITHOUT CEREBRAL INFARCTION AND WITHOUT STATUS MIGRAINOSUS: ICD-10-CM

## 2022-10-13 DIAGNOSIS — Z00.00 ROUTINE GENERAL MEDICAL EXAMINATION AT A HEALTH CARE FACILITY: ICD-10-CM

## 2022-10-13 DIAGNOSIS — J30.2 SEASONAL ALLERGIC RHINITIS, UNSPECIFIED TRIGGER: ICD-10-CM

## 2022-10-13 DIAGNOSIS — R09.A2 GLOBUS SENSATION: ICD-10-CM

## 2022-10-13 DIAGNOSIS — K21.00 GASTROESOPHAGEAL REFLUX DISEASE WITH ESOPHAGITIS WITHOUT HEMORRHAGE: ICD-10-CM

## 2022-10-13 DIAGNOSIS — Z13.6 ENCOUNTER FOR LIPID SCREENING FOR CARDIOVASCULAR DISEASE: ICD-10-CM

## 2022-10-13 DIAGNOSIS — I10 UNCONTROLLED HYPERTENSION: ICD-10-CM

## 2022-10-13 DIAGNOSIS — C73 PAPILLARY CARCINOMA, FOLLICULAR VARIANT (H): ICD-10-CM

## 2022-10-13 DIAGNOSIS — Z98.84 STATUS POST BARIATRIC SURGERY: ICD-10-CM

## 2022-10-13 DIAGNOSIS — E89.2 POSTSURGICAL HYPOPARATHYROIDISM (H): ICD-10-CM

## 2022-10-13 DIAGNOSIS — J45.20 MILD INTERMITTENT ASTHMA WITHOUT COMPLICATION: ICD-10-CM

## 2022-10-13 DIAGNOSIS — E89.0 POSTSURGICAL HYPOTHYROIDISM: ICD-10-CM

## 2022-10-13 LAB
ALBUMIN SERPL-MCNC: 3.5 G/DL (ref 3.4–5)
ALP SERPL-CCNC: 235 U/L (ref 40–150)
ALT SERPL W P-5'-P-CCNC: 126 U/L (ref 0–50)
ANION GAP SERPL CALCULATED.3IONS-SCNC: 7 MMOL/L (ref 3–14)
AST SERPL W P-5'-P-CCNC: 187 U/L (ref 0–45)
BILIRUB SERPL-MCNC: 0.7 MG/DL (ref 0.2–1.3)
BUN SERPL-MCNC: 8 MG/DL (ref 7–30)
CALCIUM SERPL-MCNC: 8.7 MG/DL (ref 8.5–10.1)
CHLORIDE BLD-SCNC: 101 MMOL/L (ref 94–109)
CHOLEST SERPL-MCNC: 211 MG/DL
CO2 SERPL-SCNC: 30 MMOL/L (ref 20–32)
CREAT SERPL-MCNC: 0.85 MG/DL (ref 0.52–1.04)
FASTING STATUS PATIENT QL REPORTED: YES
GFR SERPL CREATININE-BSD FRML MDRD: 77 ML/MIN/1.73M2
GLUCOSE BLD-MCNC: 126 MG/DL (ref 70–99)
HBA1C MFR BLD: 5.6 % (ref 0–5.6)
HDLC SERPL-MCNC: 66 MG/DL
LDLC SERPL CALC-MCNC: 124 MG/DL
NONHDLC SERPL-MCNC: 145 MG/DL
POTASSIUM BLD-SCNC: 3.8 MMOL/L (ref 3.4–5.3)
PROT SERPL-MCNC: 8.2 G/DL (ref 6.8–8.8)
SODIUM SERPL-SCNC: 138 MMOL/L (ref 133–144)
TRIGL SERPL-MCNC: 104 MG/DL
TSH SERPL DL<=0.005 MIU/L-ACNC: 0.95 MU/L (ref 0.4–4)

## 2022-10-13 PROCEDURE — 99214 OFFICE O/P EST MOD 30 MIN: CPT | Mod: 25 | Performed by: FAMILY MEDICINE

## 2022-10-13 PROCEDURE — 99396 PREV VISIT EST AGE 40-64: CPT | Mod: 25 | Performed by: FAMILY MEDICINE

## 2022-10-13 PROCEDURE — 36415 COLL VENOUS BLD VENIPUNCTURE: CPT | Performed by: FAMILY MEDICINE

## 2022-10-13 PROCEDURE — 80061 LIPID PANEL: CPT | Performed by: FAMILY MEDICINE

## 2022-10-13 PROCEDURE — 80053 COMPREHEN METABOLIC PANEL: CPT | Performed by: FAMILY MEDICINE

## 2022-10-13 PROCEDURE — 90471 IMMUNIZATION ADMIN: CPT | Performed by: FAMILY MEDICINE

## 2022-10-13 PROCEDURE — 84443 ASSAY THYROID STIM HORMONE: CPT | Performed by: FAMILY MEDICINE

## 2022-10-13 PROCEDURE — 90677 PCV20 VACCINE IM: CPT | Performed by: FAMILY MEDICINE

## 2022-10-13 PROCEDURE — 83036 HEMOGLOBIN GLYCOSYLATED A1C: CPT | Performed by: FAMILY MEDICINE

## 2022-10-13 RX ORDER — ALBUTEROL SULFATE 90 UG/1
1-2 AEROSOL, METERED RESPIRATORY (INHALATION) EVERY 4 HOURS PRN
Qty: 18 G | Refills: 11 | Status: SHIPPED | OUTPATIENT
Start: 2022-10-13 | End: 2024-02-12

## 2022-10-13 RX ORDER — LISINOPRIL/HYDROCHLOROTHIAZIDE 10-12.5 MG
1 TABLET ORAL DAILY
Qty: 30 TABLET | Refills: 2 | Status: SHIPPED | OUTPATIENT
Start: 2022-10-13 | End: 2024-03-07

## 2022-10-13 RX ORDER — SUMATRIPTAN 50 MG/1
TABLET, FILM COATED ORAL
Qty: 8 TABLET | Refills: 3 | Status: SHIPPED | OUTPATIENT
Start: 2022-10-13 | End: 2022-10-13

## 2022-10-13 RX ORDER — SUMATRIPTAN 50 MG/1
TABLET, FILM COATED ORAL
Qty: 15 TABLET | Refills: 3 | Status: SHIPPED | OUTPATIENT
Start: 2022-10-13 | End: 2022-10-18

## 2022-10-13 RX ORDER — LEVOTHYROXINE SODIUM 112 UG/1
224 TABLET ORAL DAILY
Qty: 180 TABLET | Refills: 4 | Status: SHIPPED | OUTPATIENT
Start: 2022-10-13 | End: 2024-01-04

## 2022-10-13 RX ORDER — ONDANSETRON 8 MG/1
8 TABLET, ORALLY DISINTEGRATING ORAL EVERY 8 HOURS PRN
Qty: 30 TABLET | Refills: 4 | Status: SHIPPED | OUTPATIENT
Start: 2022-10-13 | End: 2022-12-21

## 2022-10-13 RX ORDER — OLOPATADINE HYDROCHLORIDE 1 MG/ML
1 SOLUTION/ DROPS OPHTHALMIC 2 TIMES DAILY
Qty: 5 ML | Refills: 11 | Status: SHIPPED | OUTPATIENT
Start: 2022-10-13 | End: 2023-09-11

## 2022-10-13 ASSESSMENT — ANXIETY QUESTIONNAIRES
7. FEELING AFRAID AS IF SOMETHING AWFUL MIGHT HAPPEN: NOT AT ALL
6. BECOMING EASILY ANNOYED OR IRRITABLE: NOT AT ALL
8. IF YOU CHECKED OFF ANY PROBLEMS, HOW DIFFICULT HAVE THESE MADE IT FOR YOU TO DO YOUR WORK, TAKE CARE OF THINGS AT HOME, OR GET ALONG WITH OTHER PEOPLE?: NOT DIFFICULT AT ALL
IF YOU CHECKED OFF ANY PROBLEMS ON THIS QUESTIONNAIRE, HOW DIFFICULT HAVE THESE PROBLEMS MADE IT FOR YOU TO DO YOUR WORK, TAKE CARE OF THINGS AT HOME, OR GET ALONG WITH OTHER PEOPLE: NOT DIFFICULT AT ALL
2. NOT BEING ABLE TO STOP OR CONTROL WORRYING: NOT AT ALL
GAD7 TOTAL SCORE: 0
1. FEELING NERVOUS, ANXIOUS, OR ON EDGE: NOT AT ALL
GAD7 TOTAL SCORE: 0
5. BEING SO RESTLESS THAT IT IS HARD TO SIT STILL: NOT AT ALL
7. FEELING AFRAID AS IF SOMETHING AWFUL MIGHT HAPPEN: NOT AT ALL
GAD7 TOTAL SCORE: 0
4. TROUBLE RELAXING: NOT AT ALL
3. WORRYING TOO MUCH ABOUT DIFFERENT THINGS: NOT AT ALL

## 2022-10-13 ASSESSMENT — ENCOUNTER SYMPTOMS
HEARTBURN: 0
NERVOUS/ANXIOUS: 0
ABDOMINAL PAIN: 0
EYE PAIN: 1
HEMATURIA: 0
HEADACHES: 1
FREQUENCY: 0
MYALGIAS: 1
ARTHRALGIAS: 1
BREAST MASS: 0
HEMATOCHEZIA: 0
SORE THROAT: 1
SHORTNESS OF BREATH: 1
DIZZINESS: 0
JOINT SWELLING: 0
NAUSEA: 1
PALPITATIONS: 0
DYSURIA: 0
DIARRHEA: 0
PARESTHESIAS: 1

## 2022-10-13 ASSESSMENT — PATIENT HEALTH QUESTIONNAIRE - PHQ9
10. IF YOU CHECKED OFF ANY PROBLEMS, HOW DIFFICULT HAVE THESE PROBLEMS MADE IT FOR YOU TO DO YOUR WORK, TAKE CARE OF THINGS AT HOME, OR GET ALONG WITH OTHER PEOPLE: NOT DIFFICULT AT ALL
SUM OF ALL RESPONSES TO PHQ QUESTIONS 1-9: 0
SUM OF ALL RESPONSES TO PHQ QUESTIONS 1-9: 0

## 2022-10-13 ASSESSMENT — ASTHMA QUESTIONNAIRES
QUESTION_2 LAST FOUR WEEKS HOW OFTEN HAVE YOU HAD SHORTNESS OF BREATH: ONCE OR TWICE A WEEK
QUESTION_3 LAST FOUR WEEKS HOW OFTEN DID YOUR ASTHMA SYMPTOMS (WHEEZING, COUGHING, SHORTNESS OF BREATH, CHEST TIGHTNESS OR PAIN) WAKE YOU UP AT NIGHT OR EARLIER THAN USUAL IN THE MORNING: NOT AT ALL
QUESTION_4 LAST FOUR WEEKS HOW OFTEN HAVE YOU USED YOUR RESCUE INHALER OR NEBULIZER MEDICATION (SUCH AS ALBUTEROL): ONCE A WEEK OR LESS
ACT_TOTALSCORE: 22
QUESTION_1 LAST FOUR WEEKS HOW MUCH OF THE TIME DID YOUR ASTHMA KEEP YOU FROM GETTING AS MUCH DONE AT WORK, SCHOOL OR AT HOME: NONE OF THE TIME
ACT_TOTALSCORE: 22
QUESTION_5 LAST FOUR WEEKS HOW WOULD YOU RATE YOUR ASTHMA CONTROL: WELL CONTROLLED

## 2022-10-13 NOTE — PATIENT INSTRUCTIONS
Elvin    It was a pleasure seeing you in clinic today.  Here's the plan:    Hypertension - start lisinopril-hydrochlorothiazide once daily.  Follow-up in 2-3 weeks for bp check.  Bring bp cuff and log with you.  Migraine - medications refilled, referral to neurology  GERD/Globus sensation - referral to gastroenterology for assessment given your history of gastric bypass surgery  Cologuard sent to your home    Let me know if you have questions.    Pablo Zarco MD          Preventive Health Recommendations  Female Ages 50 - 64    Yearly exam: See your health care provider every year in order to  Review health changes.   Discuss preventive care.    Review your medicines if your doctor has prescribed any.    Get a Pap test every three years (unless you have an abnormal result and your provider advises testing more often).  If you get Pap tests with HPV test, you only need to test every 5 years, unless you have an abnormal result.   You do not need a Pap test if your uterus was removed (hysterectomy) and you have not had cancer.  You should be tested each year for STDs (sexually transmitted diseases) if you're at risk.   Have a mammogram every 1 to 2 years.  Have a colonoscopy at age 50, or have a yearly FIT test (stool test). These exams screen for colon cancer.    Have a cholesterol test every 5 years, or more often if advised.  Have a diabetes test (fasting glucose) every three years. If you are at risk for diabetes, you should have this test more often.   If you are at risk for osteoporosis (brittle bone disease), think about having a bone density scan (DEXA).    Shots: Get a flu shot each year. Get a tetanus shot every 10 years.    Nutrition:   Eat at least 5 servings of fruits and vegetables each day.  Eat whole-grain bread, whole-wheat pasta and brown rice instead of white grains and rice.  Get adequate Calcium and Vitamin D.     Lifestyle  Exercise at least 150 minutes a week (30 minutes a day, 5 days a  week). This will help you control your weight and prevent disease.  Limit alcohol to one drink per day.  No smoking.   Wear sunscreen to prevent skin cancer.   See your dentist every six months for an exam and cleaning.  See your eye doctor every 1 to 2 years.

## 2022-10-13 NOTE — TELEPHONE ENCOUNTER
PT Note:  Attempted PT, however, patient off floor. Will attempt another time/day as schedule permits.   Wilson Wheatley, PT, DPT  
 Pharmacist was notified.    Elvira Enamorado RN  [facility]
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
## 2022-10-13 NOTE — TELEPHONE ENCOUNTER
"Pharmacist from [facility] calling to request clarification on Imitrex script just sent today. States the script was sent in for #8 and #15, which one is correct? Advised that most current script is for #15.  Pharmacy needs to know how many migraines pt is having per month. Per office visit 10/13: \"Patient has headache 3-4x in 30 day period  alleviated by imitrex, however taking more than 8 tabs monthly\"  Pharmacy needs to know how often can they refill the 15 tablets? Every 30 days? Bill all 15 per month or 45 days?    Elvira Enamorado RN  [facility]    "

## 2022-10-14 ENCOUNTER — TELEPHONE (OUTPATIENT)
Dept: PALLIATIVE MEDICINE | Facility: OTHER | Age: 62
End: 2022-10-14

## 2022-10-14 ENCOUNTER — TELEPHONE (OUTPATIENT)
Dept: FAMILY MEDICINE | Facility: CLINIC | Age: 62
End: 2022-10-14

## 2022-10-14 DIAGNOSIS — G43.519 INTRACTABLE PERSISTENT MIGRAINE AURA WITHOUT CEREBRAL INFARCTION AND WITHOUT STATUS MIGRAINOSUS: ICD-10-CM

## 2022-10-14 NOTE — TELEPHONE ENCOUNTER
Called patient. She received the Prevnar 20 vaccine at visit 10/13 and wanted to update PCP with reaction that she had.    States that she is not having any symptoms now. She had symptoms for a little over 24 hours. Happened about 20 min after getting the vaccine. Started coughing and gagging. Arm was sore. It was warm to touch. Started having a low grade fever between . Gagged and then threw up. Symptoms lasted over 24 hours. She was also coughing up yellowish brown purulent sputum and had a migraine headache. She had capillary break in face and some blood tinged sputum. Muscles and bones hurt so bad.  Today has some coughing, other than that no symptoms at all.   She did not need to use Epipen. Declines ever having any swelling of tongue, mouth, throat. Only needed to use inhaler.     FYI on Imitrex: States that she got an increase in Imitrex. Pharmacy refused to give it to her. They only gave her 10 tabs and recommended to write dispense as ordered and then they will give 15 tabs.    Patient aware PCP out of clinic.     Barbara Alston RN   [facility]

## 2022-10-14 NOTE — TELEPHONE ENCOUNTER
Patient Returning Call    Reason for call:  Bad reaction from vaccine yesterday    Information relayed to patient:  Sent MSG    Patient has additional questions:  No    What are your questions/concerns:  Bad reaction from vaccine yesterday    Could we send this information to you in [place] [facility] or would you prefer to receive a phone call?:   Patient would prefer a phone call   Okay to leave a detailed message?: Yes at Cell number on file:    Telephone Information:   Mobile [phone]

## 2022-10-18 RX ORDER — SUMATRIPTAN 50 MG/1
TABLET, FILM COATED ORAL
Qty: 15 TABLET | Refills: 3 | Status: SHIPPED | OUTPATIENT
Start: 2022-10-18 | End: 2022-12-16

## 2022-10-21 ENCOUNTER — TELEPHONE (OUTPATIENT)
Dept: FAMILY MEDICINE | Facility: CLINIC | Age: 62
End: 2022-10-21

## 2022-10-21 NOTE — TELEPHONE ENCOUNTER
"Patient calling requesting antibiotics.     Patient reports she had adverse reaction to the pneumococcal vaccine she received 10/13/22. She thinks she may have aspirated. Writer not clear on cause of aspiration. She now has a cough \"that won't quit\" purulent green sputum, inspiratory and expiratory wheezing. Low grade fever 99.6-100.0.     Writer advised appointment. Patient states she was seen 10/13 and had cough at that time, would like to request medication.     Please advise.   "

## 2022-10-24 NOTE — TELEPHONE ENCOUNTER
"Received call from patient. She is calling to see if she has an appointment scheduled today or not. She was under the impression that apparently there was already an appointment scheduled for her for. Writer notified her that there was no appointment made, she is extremely frustrated and states she cannot go another night with pain like she is experiencing. She was then frustrated that writer asked her to elaborate regarding her symptoms and said \"look in the chart, whoever is making these notes should have written it down\". She was advised that Urgent Care is always an option and apologized for the frustration- she states she is going to Urgent Care in [place] and hung up the phone.    RADHA Lewis RN  [facility] [place]  [facility]  "

## 2022-10-26 ENCOUNTER — OFFICE VISIT (OUTPATIENT)
Dept: URGENT CARE | Facility: URGENT CARE | Age: 62
End: 2022-10-26
Payer: COMMERCIAL

## 2022-10-26 VITALS
TEMPERATURE: 98.3 F | WEIGHT: 215 LBS | OXYGEN SATURATION: 94 % | HEART RATE: 104 BPM | DIASTOLIC BLOOD PRESSURE: 70 MMHG | BODY MASS INDEX: 35.78 KG/M2 | SYSTOLIC BLOOD PRESSURE: 130 MMHG

## 2022-10-26 DIAGNOSIS — R05.1 ACUTE COUGH: Primary | ICD-10-CM

## 2022-10-26 DIAGNOSIS — J18.9 PNEUMONIA OF BOTH LOWER LOBES DUE TO INFECTIOUS ORGANISM: ICD-10-CM

## 2022-10-26 PROCEDURE — 99214 OFFICE O/P EST MOD 30 MIN: CPT | Performed by: INTERNAL MEDICINE

## 2022-10-26 RX ORDER — AZITHROMYCIN 250 MG/1
TABLET, FILM COATED ORAL
Qty: 6 TABLET | Refills: 0 | Status: SHIPPED | OUTPATIENT
Start: 2022-10-26 | End: 2022-10-31

## 2022-10-26 RX ORDER — PREDNISONE 20 MG/1
60 TABLET ORAL DAILY
Qty: 15 TABLET | Refills: 0 | Status: SHIPPED | OUTPATIENT
Start: 2022-10-26 | End: 2022-10-31

## 2022-10-27 ENCOUNTER — ANCILLARY PROCEDURE (OUTPATIENT)
Dept: GENERAL RADIOLOGY | Facility: CLINIC | Age: 62
End: 2022-10-27
Attending: INTERNAL MEDICINE
Payer: COMMERCIAL

## 2022-10-27 ENCOUNTER — TELEPHONE (OUTPATIENT)
Dept: URGENT CARE | Facility: URGENT CARE | Age: 62
End: 2022-10-27

## 2022-10-27 DIAGNOSIS — R05.1 ACUTE COUGH: ICD-10-CM

## 2022-10-27 PROCEDURE — 71046 X-RAY EXAM CHEST 2 VIEWS: CPT | Mod: TC | Performed by: RADIOLOGY

## 2022-10-27 RX ORDER — FLUCONAZOLE 150 MG/1
150 TABLET ORAL
Qty: 2 TABLET | Refills: 0 | Status: SHIPPED | OUTPATIENT
Start: 2022-10-27

## 2022-10-27 NOTE — PROGRESS NOTES
SUBJECTIVE:  Tisha Arias is an 62 year old female who presents for cough.  Initially started with sore throat and headache.  Had some drainage in throat.  Felt congestion in lungs. Tried mucinex but not help.  Developed some wheezing.  Has been about 20+days.  Coughs and feels shortness of breath when lies flat.  Can't use inhalers since coughs as soon as does the spray.  Cough is occasionally productive.  Temps of up to 100.1 on and off.  Some loose stools for some time which she thinks is from taking magnesium.  Has non hodgkins lymphoma.  Has had negative covid test. No recent travel.some leg swelling on and off over past several months and has been working with her doctor on it and is on lasix. Legs are about the same as they have been being.   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 for: dog/M/T/G/W--NEGATIVE FOOD TESTS FOR: shrimp, crab, lobster, coconut).  Patient Active Problem List   Diagnosis     Seasonal allergic rhinitis     Allergic rhinitis due to animal dander     Food intolerance     CARDIOVASCULAR SCREENING; LDL GOAL LESS THAN 160     DCIS (ductal carcinoma in situ) of right breast      Breast cancer (H)     Infection     Cellulitis     Family history of uterine cancer     Lymphedema of breast     Atrophic vaginitis     Neuropathic pain of chest     Papillary carcinoma, follicular variant (H)     Medication side effects     S/P thyroidectomy     Postsurgical subclinical hypoparathyroidism (H)     Postsurgical hypothyroidism     S/P breast reconstruction     Chronic pain, post bilateral mastectomies and breast reconstruction      Idiopathic mast cell activation syndrome (H)     Mild intermittent asthma without complication     Chest wall pain     Intertrigo     Rash     Migraine without status migrainosus, not intractable, unspecified migraine type     Status post bariatric surgery     Hypocalcemia     Personal history of malignant neoplasm of breast     High grade B-cell lymphoma (H)     DLBCL (diffuse large B cell lymphoma) (H)     Diffuse large B cell lymphoma (H)     Bilateral lower extremity edema     Kidney stone     Chronic pain disorder     Long term current use of opiate analgesic     Compound heterozygous MTHFR mutation [id number]     History of migraine headaches     Intestinal malabsorption     Serotonin neurotoxicity     History of gastric bypass     Opioid-induced hyperalgesia     H/O drug withdrawal syndrome     Anxiety     Constipation     Lymphoma (H)     Opioid withdrawal (H)     Drug-induced polyneuropathy (H)     Eye problems     Muscle cramps     Benign essential hypertension     Chronic, continuous use of opioids     Social History     Socioeconomic History     Marital status:      Spouse name: None     Number of children: None     Years of education: None     Highest education level: None   Tobacco Use     Smoking status: Never     Smokeless tobacco: Never     Tobacco comments:     no 2nd hand smoke exposure   Substance and Sexual Activity     Alcohol use: No     Comment: rare     Drug use: No     Sexual activity: Not Currently     Partners: Male     Birth  control/protection: Surgical     Comment: Tubal Ligation then hysterectomy   Other Topics Concern     Parent/sibling w/ CABG, MI or angioplasty before 65F 55M? Yes     Comment: Adrian Martinez   Social History Narrative        Stopped working at [employer] in Nov 2018.,  Currently on medical leave.  Afraid she will lose position if she can not return to work in March as planned.      Lives with .  Two adult children.  Granddaughter age 1      Family History   Problem Relation Age of Onset     Allergies Mother      Arthritis Mother      Cancer Mother      Hypertension Mother      Hyperlipidemia Mother      Cerebrovascular Disease Mother         s/p brain surgery     Breast Cancer Mother      Depression Mother      Anxiety Disorder Mother      Anesthesia Reaction Mother      Asthma Mother      Skin Cancer Mother      Heart Disease Father      Diabetes Father      Coronary Artery Disease Father      Hypertension Father      Hyperlipidemia Father      Cerebrovascular Disease Father         Multiple strokes     Obesity Father      Diabetes Brother      Depression Brother      Hypertension Brother      Cancer Maternal Grandfather      Prostate Cancer Maternal Grandfather         Prostrate and Bladder     Other Cancer Maternal Grandfather         Pancreatic 1988, Bladder 1977     Cancer Maternal Grandmother      Breast Cancer Maternal Grandmother      Skin Cancer Maternal Grandmother      Cancer Brother 57        pancreatic cancer     Diabetes Brother      Breast Cancer Cousin         Grand mothers sister     Other Cancer Brother         Stage 3 Pancreatic 2-2015     Depression Brother      Asthma Brother      Obesity Brother      Anesthesia Reaction Other         H/a, itching, drug interactions     Asthma Other      Cancer Brother         Pancreatic      Thyroid Disease No family hx of      Cancer Brother      Depression Brother      Heart Disease Paternal Grandmother      Hypertension  Paternal Grandmother      Coronary Artery Disease Paternal Grandmother      Diabetes Paternal Grandfather      Coronary Artery Disease Paternal Grandfather        ALLERGIES:  Baclofen, Buprenorphine, Clonidine, Duloxetine, Methocarbamol, No clinical screening - see comments, Nuts, Oxycontin [oxycodone], Serotonin, Suboxone, Tizanidine, Amitriptyline, Amitriptyline hcl, Birch trees, Blue dyes, Buprenorphine-naloxone, Codeine, Cymbalta, Gabapentin, Grass, Hydroxyzine, Metaxalone, Mugwort [artemisia vulgaris], Pollen extract, Pregabalin, Ragweeds, Tamoxifen, Topamax, Topiramate, and Nortriptyline    Current Outpatient Medications   Medication     ACAI BERRY PO     acetaminophen (TYLENOL) 325 MG tablet     albuterol (VENTOLIN HFA) 108 (90 Base) MCG/ACT inhaler     calcitRIOL (ROCALTROL) 0.25 MCG capsule     calcium citrate-vitamin D (CALCIUM CITRATE + D3) 315-250 MG-UNIT TABS per tablet     celecoxib (CELEBREX) 200 MG capsule     cetirizine (ZYRTEC ALLERGY) 10 MG tablet     Cholecalciferol (VITAMIN D3) 50 MCG (2000 UT) CAPS     cromolyn (OPTICROM) 4 % ophthalmic solution     cromolyn sodium (NASALCROM) 5.2 MG/ACT nasal aerosol     cyclobenzaprine (FLEXERIL) 10 MG tablet     cyclobenzaprine (FLEXERIL) 10 MG tablet     diclofenac (VOLTAREN) 1 % topical gel     famotidine (PEPCID) 20 MG tablet     fluticasone (FLONASE) 50 MCG/ACT nasal spray     hydrOXYzine (ATARAX) 25 MG tablet     ketamine HCl POWD     ketamine HCl POWD     ketorolac (TORADOL) 30 MG/ML injection     KLOR-CON 20 MEQ CR tablet     levothyroxine (SYNTHROID/LEVOTHROID) 112 MCG tablet     lidocaine (XYLOCAINE) 5 % external ointment     lidocaine-prilocaine (EMLA) cream     lisinopril-hydrochlorothiazide (ZESTORETIC) 10-12.5 MG tablet     magnesium oxide (MAG-OX) 400 MG tablet     Menaquinone-7 (VITAMIN K2) 100 MCG CAPS     montelukast (SINGULAIR) 10 MG tablet     naloxone (NARCAN) nasal spray     olopatadine (PATANOL) 0.1 % ophthalmic solution     omeprazole  (PRILOSEC) 10 MG DR capsule     ondansetron (ZOFRAN ODT) 8 MG ODT tab     oxyCODONE IR (ROXICODONE) 30 MG tablet     Probiotic Product (PROBIOTIC DAILY PO)     ranitidine (ZANTAC) 75 MG tablet     SUMAtriptan (IMITREX) 50 MG tablet     tobramycin (TOBREX) 0.3 % ophthalmic solution     tobramycin-dexamethasone (TOBRADEX) 0.3-0.1 % ophthalmic suspension     triamcinolone (KENALOG) 0.025 % ointment     Triamcinolone Acetonide (AZMACORT IN)     UNABLE TO FIND     UNABLE TO FIND     UNABLE TO FIND     No current facility-administered medications for this visit.         ROS:  ROS is done and is negative for general/constitutional, eye, ENT, Respiratory, cardiovascular, GI, , Skin, musculoskeletal except as noted elsewhere.  All other review of systems negative except as noted elsewhere.      OBJECTIVE:  /70   Pulse 104   Temp 98.3  F (36.8  C) (Tympanic)   Wt 97.5 kg (215 lb)   LMP  (LMP Unknown)   SpO2 94%   BMI 35.78 kg/m    GENERAL APPEARANCE: Alert, in no acute distress  EYES: normal  EARS: External ears normal. Canals clear. TM's normal.  NOSE:mildly inflamed mucosa  OROPHARYNX:normal  NECK:No adenopathy,masses or thyromegaly  RESP: scattered upper airway noise, mildly prolonged exp phase.  CV:regular rate and rhythm and no murmurs, clicks, or gallops  ABDOMEN: Abdomen soft, non-tender. BS normal. No masses, organomegaly  SKIN: no ulcers, lesions or rash  MUSCULOSKELETAL:Musculoskeletal normal  LEGS: mild firm edema up to mid calf, non-tender.    RESULTS  No results found for any visits on 10/26/22..  No results found for this or any previous visit (from the past 48 hour(s)).    ASSESSMENT/PLAN:    ASSESSMENT / PLAN:  (R05.1) Acute cough  (primary encounter diagnosis)  Comment: pt needs xray as has had cough for about 4 weeks and is not resolving, but xray unavailable at the moment on site, so future xray ordered for tomorrow. Pt's preferred site for that is [place] as it is close to home.  Xray will  help rule out mediastinal enlargement as pt has hx of non-hodgkins lymphoma, and also eval for signs of chf or nodules, etc which could cause cough.  Will start treatment now with prednisone and zmax as leading cause in differential currently is infectious and most c/w atypicals.  Plan: XR Chest 2 Views, predniSONE (DELTASONE) 20 MG         tablet, azithromycin (ZITHROMAX) 250 MG tablet,      Await xray results and adjust treatment plan if other etiologies identified (eg. Increase lasix dose, f/u with cardiology, f/u with pcp, f/u with oncology, etc as needed based on result) Reviewed medication instructions and side effects. Follow up if experiences side effects.. I reviewed supportive care, otc meds to use if needed, expected course, and signs of concern.  Follow up as needed or if she does not improve within 1 week(s) or if worsens in any way.  Reviewed red flag symptoms and is to go to the ER if experiences any of these.  10/27 Addendum: CXR done on 10/27 shows bibasilar infiltrates concerning for pneumonia.  I called pt with result and added augmentin to her other meds (zmax and prednisone) to fully cover pneumonia spectrum.  See [facility] for orders, medications, letters, patient instructions  Time spent: 32 minutes spent during visit, reviewing test results, chart review, and charting on day of encounter  Tiny Holbrook M.D.

## 2022-10-27 NOTE — TELEPHONE ENCOUNTER
Call pharmacy - if pt is currently taking amoxicillin, advise pt to stop any amoxicillin she is taking while is on augmentin.  Also call pt to inform of this (she did not indicate at visit that she was taking amoxicillin)

## 2022-10-27 NOTE — TELEPHONE ENCOUNTER
Pharmacist from [facility] in [place] called to clinic to get clarification on medication sent by  provider today, amoxicillin-clavulanate (AUGMENTIN) 875-125 MG tablet.    Pharmacist noted that they sold a prescription to patient on Monday (10/24) for plain Amoxicillin 875 mg, #14 tabs, ordered by a dental provider from [facility].  Per pharmacist, along with the new prescription for Augmentin today, if patient is taking the previous Amoxicillin from dentist and now takes Augmentin, this is too much amoxicillin and patient likely to experience significant upset stomach and diarrhea.  Pharmacist asking if provider was made aware that patient was given the Amoxicillin on Monday?  Pharmacist would like call back with update on plan, she is going to hold on dispensing the Augmentin for now until hears back from office.    Writer attempted to contact patient to clarify if taking Amoxicillin from dental office. Phone went to busy signal immediately x 4 attempts. Was not able to contact patient at this time.      Routing to ordering provider to clarify.    Please send this encounter back to the [facility] care team when complete. Thank you.      Naima Lanza RN  [facility]  Primary Care Department

## 2022-10-27 NOTE — TELEPHONE ENCOUNTER
Clarified with pharmacist that pt should stop taking amoxicillin and take Augmentin instead. Pharmacy will call patient to let her know.

## 2022-11-01 LAB — NONINV COLON CA DNA+OCC BLD SCRN STL QL: NEGATIVE

## 2022-11-02 NOTE — PROGRESS NOTES
Spoke with Elvin who is requesting to have a CK Total drawn d/t chest and breathing pain after having a MRI. Dr Crawley ok with lab draw.   She would like to have the appointment at [facility] today 1:45 or 2:00. Message sent to scheduling.    left upper arm

## 2022-11-22 ENCOUNTER — TELEPHONE (OUTPATIENT)
Dept: PALLIATIVE MEDICINE | Facility: OTHER | Age: 62
End: 2022-11-22

## 2022-11-22 DIAGNOSIS — R07.1 CHEST PAIN ON BREATHING: ICD-10-CM

## 2022-11-22 RX ORDER — OXYCODONE HYDROCHLORIDE 30 MG/1
30 TABLET ORAL EVERY 4 HOURS PRN
Qty: 64 TABLET | Refills: 0 | Status: SHIPPED | OUTPATIENT
Start: 2022-11-22 | End: 2022-11-25

## 2022-11-22 NOTE — TELEPHONE ENCOUNTER
Received call from patient requesting refill  Oxycodone 30 mg-remainder fill    Last dispensed from pharmacy on 11/11/22-20 tabs    Patient's last office/virtual visit by prescribing provider on 9/20/22  Next office/virtual appointment scheduled for 12/14/22    Pending Prescriptions:                       Disp   Refills    oxyCODONE IR (ROXICODONE) 30 MG tablet    64 tab*0            Sig: Take 1 tablet (30 mg) by mouth every 4 hours as           needed for severe pain (7-10) May fill anytime,           this is a remainder

## 2022-11-22 NOTE — TELEPHONE ENCOUNTER
[facility]    Phone Message    May a detailed message be left on voicemail: yes     Reason for Call: Medication Refill Request    Has the patient contacted the pharmacy for the refill? Yes   Name of medication being requested: oxyCODONE IR (ROXICODONE) 30 MG tablet  Provider who prescribed the medication: Dr Berry  Pharmacy: [id number] - [place], [place] - [address] [place]  Date medication is needed: 11/25/2022    Action Taken: Message routed to:  Other: [facility]    Travel Screening: Not Applicable

## 2022-11-23 NOTE — TELEPHONE ENCOUNTER
[facility]    Phone Message    May a detailed message be left on voicemail: yes     Reason for Call: Other: Cecily Pharmacist from [facility] called stating that oxyCODONE IR (ROXICODONE) 30 MG tablet has previously been filled as a \"partial completion\" of 64 tablets due to stock. Cecily states the original prescription had been for 84 tablets and is wondering if a new prescription could be sent in for 14 day supply.   Please review.    Action Taken: Message routed to:  Other: [facility]    Travel Screening: Not Applicable                                                                      "

## 2022-11-23 NOTE — TELEPHONE ENCOUNTER
Received call from patient requesting refill  Oxycodone 30 mg    Last dispensed from pharmacy on 11/11/22-4 days    Patient's last office/virtual visit by prescribing provider on 9/20/22  Next office/virtual appointment scheduled for 12/14/22    UDT/CSA = 12/14/2021    oxyCODONE IR (ROXICODONE) 30 MG tablet 64 tablet 0 11/22/2022  No   Sig - Route: Take 1 tablet (30 mg) by mouth every 4 hours as needed for severe pain (7-10) May fill anytime, this is a remainder - Oral   Sent to pharmacy as: oxyCODONE HCl 30 MG Oral Tablet (ROXICODONE)   Class: E-Prescribe   Earliest Fill Date: 11/22/2022   Order: [id number]   E-Prescribing Status: Receipt confirmed by pharmacy (11/22/2022  4:32 PM CST)       Remainder refill already sent

## 2022-11-25 ENCOUNTER — TELEPHONE (OUTPATIENT)
Dept: PALLIATIVE MEDICINE | Facility: OTHER | Age: 62
End: 2022-11-25

## 2022-11-25 DIAGNOSIS — R07.1 CHEST PAIN ON BREATHING: ICD-10-CM

## 2022-11-25 RX ORDER — OXYCODONE HYDROCHLORIDE 30 MG/1
30 TABLET ORAL EVERY 4 HOURS PRN
Qty: 84 TABLET | Refills: 0 | Status: SHIPPED | OUTPATIENT
Start: 2022-11-25 | End: 2022-12-06

## 2022-11-25 NOTE — TELEPHONE ENCOUNTER
Received call from patient requesting refill(s) of Oxycodone    Last dispensed from pharmacy on 11/11 for quantity 84 for 14 days    Patient's last office/virtual visit by prescribing provider on 9/20  Next office/virtual appointment scheduled for 12/14    UDT/CSA = 12/14/2021  Pending Prescriptions:                       Disp   Refills    oxyCODONE IR (ROXICODONE) 30 MG tablet    84 tab*0            Sig: Take 1 tablet (30 mg) by mouth every 4 hours as           needed for severe pain (7-10) May fill/start           11/25/22  [facility]

## 2022-11-25 NOTE — TELEPHONE ENCOUNTER
[facility] Call [facility]    Phone Message    May a detailed message be left on voicemail: yes     Reason for Call: Other: Pt called back very up[place] and concerned that pharmacy is stating they cannot fii Rx as sent.  She needs this filled ASAP as she is ready to head out of town at this time.    Action Taken: Message routed to:  Other: [facility]    Travel Screening: Not Applicable

## 2022-11-25 NOTE — TELEPHONE ENCOUNTER
[facility] Call [facility]    Phone Message    May a detailed message be left on voicemail: yes     Reason for Call: Medication Refill Request    Has the patient contacted the pharmacy for the refill? Yes   Name of medication being requested: oxyCODONE IR (ROXICODONE) 30 MG tablet  Provider who prescribed the medication: Dr. Berry  Pharmacy: [id number] - [place], [place] - [address] [place]  Date medication is needed: ASAP    Pt is leaving town for the weekend and needs to ick up her meds today.       Action Taken: Message routed to:  Other: [facility]    Travel Screening: Not Applicable

## 2022-11-25 NOTE — TELEPHONE ENCOUNTER
[facility] Call [facility]    Phone Message    May a detailed message be left on voicemail: yes     Reason for Call: Medication Question or concern regarding medication   Prescription Clarification  Name of Medication: oxyCODONE IR (ROXICODONE) 30 MG tablet [[id number]] (Order [id number])      Pharmacy is requesting that script that we sent over ASAP  Prescribing Provider: Jamal    Pharmacy: [facility]   What on the order needs clarification? Xin is calling from [facility] pharmacy stating that the need a a full quantity of 84 tablets instead of the partial order of 64 tablets that was sent in earlier this month.   Please advise.      Action Taken: Message routed to:  [facility]    Travel Screening: Not Applicable

## 2022-12-07 ENCOUNTER — TELEPHONE (OUTPATIENT)
Dept: PALLIATIVE MEDICINE | Facility: OTHER | Age: 62
End: 2022-12-07

## 2022-12-07 NOTE — TELEPHONE ENCOUNTER
[facility] Call [facility]    Phone Message    May a detailed message be left on voicemail: yes     Reason for Call: Other: Dr. Miranda calling to request a call back to discuss if it would be ok with Dr. Berry if she prescribes Elvin hydrocodone 5 mg for her pain. Dr. Miranda removed 11 of Elvin' teeth yesterday and Elvin is requesting pain medication from Dr. Miranda. Please call Dr. Miranda at [phone] to discuss at your earliest convenience.     Action Taken: Message routed to:  Other: [facility]    Travel Screening: Not Applicable

## 2022-12-07 NOTE — TELEPHONE ENCOUNTER
Dr. Berry was consulted and gave ok to prescribe.   RN called Dr Miranda and left [facility] giving ok to prescribe for this acute pain. Asked that he request that the pt call the clinic to notify of the quantity of Hydrocodone he prescribes.

## 2022-12-11 ENCOUNTER — MYC REFILL (OUTPATIENT)
Dept: FAMILY MEDICINE | Facility: CLINIC | Age: 62
End: 2022-12-11

## 2022-12-11 DIAGNOSIS — D89.42 IDIOPATHIC MAST CELL ACTIVATION SYNDROME (H): Primary | Chronic | ICD-10-CM

## 2022-12-11 DIAGNOSIS — Z91.018 TREE NUT ALLERGY: ICD-10-CM

## 2022-12-13 DIAGNOSIS — R07.89 CHEST WALL PAIN: ICD-10-CM

## 2022-12-13 RX ORDER — CELECOXIB 200 MG/1
CAPSULE ORAL
Qty: 60 CAPSULE | Refills: 3 | Status: SHIPPED | OUTPATIENT
Start: 2022-12-13 | End: 2023-04-17

## 2022-12-13 NOTE — TELEPHONE ENCOUNTER
Received fax request from [facility] pharmacy requesting refill(s) for Celecoxib 200 mg  caps    Last refilled on 12/5/2022, 30 days, 60 # per outside meds    Pt last seen on 9/20/2022  Next appt scheduled for 12/14/2022    Pending Prescriptions:                       Disp   Refills    celecoxib (CELEBREX) 200 MG capsule       60 cap*3            Sig: Take 1 capsule (200 mg) by mouth TWO times daily

## 2022-12-14 ENCOUNTER — VIRTUAL VISIT (OUTPATIENT)
Dept: PALLIATIVE MEDICINE | Facility: OTHER | Age: 62
End: 2022-12-14
Payer: COMMERCIAL

## 2022-12-14 DIAGNOSIS — G89.4 CHRONIC PAIN DISORDER: ICD-10-CM

## 2022-12-14 PROCEDURE — 99213 OFFICE O/P EST LOW 20 MIN: CPT | Mod: GT | Performed by: ANESTHESIOLOGY

## 2022-12-14 PROCEDURE — G0463 HOSPITAL OUTPT CLINIC VISIT: HCPCS | Mod: PN,GT | Performed by: ANESTHESIOLOGY

## 2022-12-14 RX ORDER — KETAMINE HCL 100 %
POWDER (GRAM) MISCELLANEOUS
Qty: 120 G | Refills: 3 | Status: SHIPPED | OUTPATIENT
Start: 2022-12-14 | End: 2023-06-19

## 2022-12-14 ASSESSMENT — PAIN SCALES - GENERAL: PAINLEVEL: SEVERE PAIN (7)

## 2022-12-14 NOTE — PROGRESS NOTES
"[facility] - Office Visit    ASSESSMENT & PLAN     Tisha was seen today for pain.    Diagnoses and all orders for this visit:    Chronic pain disorder  -     ketamine HCl POWD; 120 mg lozenge one-half to one lozenge up to 4 times a day as needed, #120        There are no Patient Instructions on file for this visit.      -----  ADRI DIXON MD  [facility]       SUBJECTIVE      Tisha Arias is a 62 year old year old female seen via video visit.    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    She reviews today working with her dentist having 12 teeth pulled, has some that are impacted and will need to return.  She is pleased that she found a dentist within her primary care doctor's office that takes her insurance.    The dentist did call to clarify was provided some opioid.    When last seen we discussed lower extremity edema.  She saw her primary care provider did not comment.  She has not yet seen her oncologist but is doing more \"ankle pumps\" with some benefit.    Continues with the ketamine 1 and 20 mg lozenges 4 times a day.  Has been playing phone tag with the pharmacy to change flavor for the next fill.    Notes there are not other significant changes.  Continues with the oxycodone 30 mg every 4 hours.  Last urine drug test in December so due to be obtained.   reviewed      Current Outpatient Medications:      ACAI RAMIREZ PO, Take 50 mg by mouth, Disp: , Rfl:      acetaminophen (TYLENOL) 325 MG tablet, Take 2 tablets (650 mg) by mouth every 4 hours as needed for mild pain or fever, Disp: 100 tablet, Rfl:      albuterol (VENTOLIN HFA) 108 (90 Base) MCG/ACT inhaler, Inhale 1-2 puffs into the lungs every 4 hours as needed for shortness of breath / dyspnea or wheezing, Disp: 18 g, Rfl: 11     calcitRIOL (ROCALTROL) 0.25 MCG capsule, TAKE TWO CAPSULES BY MOUTH IN THE MORNING AND TAKE ONE CAPSULE BY MOUTH IN THE EVENING., Disp: 270 capsule, " Rfl: 4     calcium citrate-vitamin D (CALCIUM CITRATE + D3) 315-250 MG-UNIT TABS per tablet, Take 2 tablets by mouth 2 times daily, Disp: 60 tablet, Rfl: 2     celecoxib (CELEBREX) 200 MG capsule, Take 1 capsule (200 mg) by mouth TWO times daily, Disp: 60 capsule, Rfl: 3     cetirizine (ZYRTEC ALLERGY) 10 MG tablet, Take 1 tablet (10 mg) by mouth 3 times daily On hold for lab test., Disp: 90 tablet, Rfl: 0     Cholecalciferol (VITAMIN D3) 50 MCG (2000 UT) CAPS, Take 2,000 Units by mouth daily, Disp: 90 capsule, Rfl: 1     cromolyn (OPTICROM) 4 % ophthalmic solution, Place 1 drop into both eyes 4 times daily, Disp: 10 mL, Rfl: 0     cromolyn sodium (NASALCROM) 5.2 MG/ACT nasal aerosol, SPRAY ONE SPRAY( 1 ML) IN NOSTRIL DAILY, Disp: 1 mL, Rfl: 3     cyclobenzaprine (FLEXERIL) 10 MG tablet, Take 1 tablet (10 mg) by mouth 4 times daily This is a 90 day supply, Disp: 360 tablet, Rfl: 1     cyclobenzaprine (FLEXERIL) 10 MG tablet, Take 1 tablet (10 mg) by mouth 3 times daily as needed for muscle spasms, Disp: 30 tablet, Rfl: 0     diclofenac (VOLTAREN) 1 % topical gel, Apply affected area two times daily as needed using enclosed dosing card. (2-4 grams to chest wall), Disp: 100 g, Rfl: 1     famotidine (PEPCID) 20 MG tablet, Take 1 tablet (20 mg) by mouth 2 times daily This is a 90 day supply, Disp: 180 tablet, Rfl: 1     fluconazole (DIFLUCAN) 150 MG tablet, Take 1 tablet (150 mg) by mouth once as needed (yeast infection symptoms) Repeat in 5 days if still having symptoms., Disp: 2 tablet, Rfl: 0     fluticasone (FLONASE) 50 MCG/ACT nasal spray, Spray 1-2 sprays into both nostrils daily, Disp: 16 g, Rfl: 2     hydrOXYzine (ATARAX) 25 MG tablet, One to two tabs every six hours for itching. This is a 90 day supply, Disp: 270 tablet, Rfl: 3     ketamine HCl POWD, 120 mg lozenge one-half to one lozenge up to 4 times a day as needed, #120, Disp: 120 g, Rfl: 3     ketamine HCl POWD, Ketamine 10% and lidocaine 10% in topical  cream, apply to feet tid , 90 gm tube, Disp: 90 g, Rfl: 3     ketorolac (TORADOL) 30 MG/ML injection, Inject 1 mL (30 mg) into the muscle every 6 hours as needed for moderate pain, Disp: 4 mL, Rfl: 3     KLOR-CON 20 MEQ CR tablet, TAKE ONE TABLET BY MOUTH TWICE DAILY, Disp: 60 tablet, Rfl: 0     levothyroxine (SYNTHROID/LEVOTHROID) 112 MCG tablet, Take 2 tablets (224 mcg) by mouth daily, Disp: 180 tablet, Rfl: 4     lidocaine (XYLOCAINE) 5 % external ointment, Apply quarter size amount to chest and back up to 3 times daily as needed for pain., Disp: 350 g, Rfl: 1     lidocaine-prilocaine (EMLA) cream, Apply topically as needed for moderate pain, Disp: 60 g, Rfl: 1     lisinopril-hydrochlorothiazide (ZESTORETIC) 10-12.5 MG tablet, Take 1 tablet by mouth daily, Disp: 30 tablet, Rfl: 2     magnesium oxide (MAG-OX) 400 MG tablet, Take 1 tablet (400 mg) by mouth 3 times daily, Disp: 90 tablet, Rfl: 4     Menaquinone-7 (VITAMIN K2) 100 MCG CAPS, Take 1 capsule by mouth 2 times daily, Disp: 60 capsule, Rfl: 3     montelukast (SINGULAIR) 10 MG tablet, TAKE TWO TABLETS BY MOUTH TWICE DAILY , Disp: 120 tablet, Rfl: 0     naloxone (NARCAN) nasal spray, Spray 1 spray (4 mg) into one nostril alternating nostrils as needed for opioid reversal every 2-3 minutes until assistance arrives, Disp: 0.2 mL, Rfl: 0     olopatadine (PATANOL) 0.1 % ophthalmic solution, Place 1 drop into both eyes 2 times daily, Disp: 5 mL, Rfl: 11     omeprazole (PRILOSEC) 10 MG DR capsule, Take 2 capsules (20 mg) by mouth daily, Disp: 30 capsule, Rfl: 1     ondansetron (ZOFRAN ODT) 8 MG ODT tab, Take 1 tablet (8 mg) by mouth every 8 hours as needed for nausea, Disp: 30 tablet, Rfl: 4     oxyCODONE IR (ROXICODONE) 30 MG tablet, Take 1 tablet (30 mg) by mouth every 4 hours as needed for severe pain (7-10) May fill 12/7/22;start 12/9/22, Disp: 84 tablet, Rfl: 0     Probiotic Product (PROBIOTIC DAILY PO), Take 1 capsule by mouth daily Lacto acid  bifidobacterium, Disp: , Rfl:      ranitidine (ZANTAC) 75 MG tablet, Take 1 tablet (75 mg) by mouth 3 times daily, Disp: 90 tablet, Rfl: 3     SUMAtriptan (IMITREX) 50 MG tablet, Take 1 tablet by mouth as needed for migraine . May repeat dose in 2 hours as needed. Maximum dose 4 tablets in 24 hours, Disp: 15 tablet, Rfl: 3     tobramycin (TOBREX) 0.3 % ophthalmic solution, ADMINISTER 1 2 DROPS TO THE RIGHT EYE EVERY 4 (FOUR) HOURS FOR 7 DAYS., Disp: , Rfl:      tobramycin-dexamethasone (TOBRADEX) 0.3-0.1 % ophthalmic suspension, INSTILL ONE DROP INTO BOTH EYES THREE TIMES DAILY, Disp: , Rfl:      triamcinolone (KENALOG) 0.025 % ointment, Apply topically as needed, Disp: , Rfl: 3     Triamcinolone Acetonide (AZMACORT IN), Inhale 2 puffs into the lungs as needed, Disp: , Rfl:      UNABLE TO FIND, Take 2 capsules by mouth 3 times daily Muscle Mag. 2 caps contain B1 20mg, B2 20mg, B6 10mg, magesium 20mg, manganese 2mg., Disp: , Rfl:      UNABLE TO FIND, 3 tablets 3 times daily MEDICATION NAME: calcium D-Glucarate  3 caps contain 180mg of elemental calcium., Disp: , Rfl:      UNABLE TO FIND, 1 tablet daily MEDICATION NAME: Pure Encapsulations, Disp: , Rfl:       Review of Systems   General, psych, musculoskeletal, bowels and bladder otherwise normal other than above.          OBJECTIVE   LMP  (LMP Unknown)       Physical Exam  General: Alert, clear sensorium, does indeed appear to have dental procedures with swollen face  Cardiovascular: Normal rate  Lungs: Pulmonary effort is normal, speaking in full sentences   No concerning rashes or lesions.  Neurologic: No focal deficit, alert and oriented x3  Psychiatric: Affect congruent      Assessment: Peripheral neuropathy related to chemotherapy.  Continues on relatively higher dose of oxycodone.  I have had a visit with the patient and her friend/therapist who indicates with efforts to taper the opioid beyond this point had significant decrease in function.  Reviewed  historically trial of a variety different opioids and other adjuvant regimen with limited benefit.  As such we have continue at this dose for the present with ketamine augmentation.    Discussed the use of a grounding mat.    Total time more than 20 minutes, video start time 4: 10, video end time 4: 23.  Patient at home via [employer]

## 2022-12-15 RX ORDER — MONTELUKAST SODIUM 10 MG/1
2 TABLET ORAL 2 TIMES DAILY
Qty: 120 TABLET | Refills: 11 | Status: SHIPPED | OUTPATIENT
Start: 2022-12-15 | End: 2022-12-15

## 2022-12-15 RX ORDER — MONTELUKAST SODIUM 10 MG/1
2 TABLET ORAL 2 TIMES DAILY
Qty: 120 TABLET | Refills: 11 | Status: SHIPPED | OUTPATIENT
Start: 2022-12-15

## 2022-12-15 NOTE — PATIENT INSTRUCTIONS
----------------------------------------------------------------  [facility] Number:  [phone]   Call with any questions about your care and for scheduling assistance.   Calls are returned Monday through Friday between 8 AM and 4:30 PM. We usually get back to you within 2 business days depending on the issue/request.    If we are prescribing your medications:  For opioid medication refills, call the clinic or send a Enbase message 7 days in advance.  Please include:  Name of requested medication  Name of the pharmacy.  For non-opioid medications, call your pharmacy directly to request a refill. Please allow 3-4 days to be processed.   Per [place] State Law:  All controlled substance prescriptions must be filled within 30 days of being written.    For those controlled substances allowing refills, pickup must occur within 30 days of last fill.      We believe regular attendance is key to your success in our program!    Any time you are unable to keep your appointment we ask that you call us at least 24 hours in advance to cancel.This will allow us to offer the appointment time to another patient.   Multiple missed appointments may lead to dismissal from the clinic.     PLAN:    Continue oxycodone 30 mg every 4 hours.    Continue the ketamine 120 mg lozenges 4 times a day.    Discussed the use of an earthing mat    You are continuing with dental procedures.    Follow with Dr. Berry in the office in 3 months

## 2022-12-15 NOTE — TELEPHONE ENCOUNTER
Dr. Soto     [facility] pharmacist called stating that dose of montelukast is being flagged in their system as high dose. Pt taking 20 mg 2x daily total 40 mg. I noted in chart review she has had this same script since 2016. Pharmacist stated they need verbal ok from PCP that pt is ok to take this dose.    Thanks,  CASSIDY Iglesias  [facility]

## 2022-12-16 NOTE — TELEPHONE ENCOUNTER
Spoke with pharm and notified of below message from PCP. Verbalized understanding.     Thanks,  CASSIDY Iglesias  [facility]

## 2022-12-16 NOTE — TELEPHONE ENCOUNTER
20mg BID is ok.  This is for treatment of her mast cell disease.  Diagnosis corrected in chart.    Pablo Zarco MD

## 2022-12-18 NOTE — TELEPHONE ENCOUNTER
Reason for Call:  Other prescription    Detailed comments: Patient was given a prescription for Lyrica today at appointment this has a very high co pay (over $200.00). Patient was wondering if she could have something less expensive like gabapentin? Patient would like tonight if possible. Please Call pharmacy    Phone Number Patient can be reached at: Other phone number: [facility] pharmacy [phone]    Best Time: ASAP    Can we leave a detailed message on this number? YES    Call taken on 12/11/2018 at 4:01 PM by Ml Naranjo     No

## 2022-12-21 DIAGNOSIS — C85.10 HIGH GRADE B-CELL LYMPHOMA (H): ICD-10-CM

## 2022-12-21 RX ORDER — ONDANSETRON 8 MG/1
TABLET, ORALLY DISINTEGRATING ORAL
Qty: 30 TABLET | Refills: 0 | Status: SHIPPED | OUTPATIENT
Start: 2022-12-21 | End: 2023-02-17

## 2022-12-23 DIAGNOSIS — R07.1 CHEST PAIN ON BREATHING: ICD-10-CM

## 2022-12-24 RX ORDER — OXYCODONE HYDROCHLORIDE 30 MG/1
30 TABLET ORAL EVERY 4 HOURS PRN
Qty: 84 TABLET | Refills: 0 | Status: SHIPPED | OUTPATIENT
Start: 2022-12-24 | End: 2022-12-26

## 2023-01-05 DIAGNOSIS — R07.1 CHEST PAIN ON BREATHING: ICD-10-CM

## 2023-01-05 RX ORDER — OXYCODONE HYDROCHLORIDE 30 MG/1
30 TABLET ORAL EVERY 4 HOURS PRN
Qty: 84 TABLET | Refills: 0 | Status: SHIPPED | OUTPATIENT
Start: 2023-01-05 | End: 2023-01-09

## 2023-01-05 NOTE — TELEPHONE ENCOUNTER
Pending Prescriptions:                       Disp   Refills    oxyCODONE IR (ROXICODONE) 30 MG tablet    84 tab*0            Sig: Take 1 tablet (30 mg) by mouth every 4 hours as           needed for severe pain (7-10) May fill 1/8/23 for           start 1/9/23    Cub

## 2023-01-05 NOTE — TELEPHONE ENCOUNTER
Received call from patient requesting refill(s) of oxyCODONE IR (ROXICODONE) 30 MG tablet    Last dispensed from pharmacy on 12/24/22    Patient's last office/virtual visit by prescribing provider on 12/14/22  Next office/virtual appointment scheduled for : none    Last urine drug screen date 12/14/21  Current opioid agreement on file (completed within the last year) No Date of opioid agreement: 12/14/21    E-prescribe to pharmacy-[id number] - [place], [place] - [address] [address] [place]    Will route to nursing pool for review and preparation of prescription(s).

## 2023-01-05 NOTE — TELEPHONE ENCOUNTER
[facility] Call Center    Phone Message    May a detailed message be left on voicemail: yes     Reason for Call: Medication Refill Request    Has the patient contacted the pharmacy for the refill? Yes   Name of medication being requested: oxyCODONE IR (ROXICODONE) 30 MG tablet  Provider who prescribed the medication: Thanh Berry MD  Pharmacy: [facility] #[id number] - [place], [place] - [address] [address] [place]  Date medication is needed: ASAP         Action Taken: Message routed to:  Other: [facility]    Travel Screening: Not Applicable

## 2023-01-06 NOTE — TELEPHONE ENCOUNTER
Pt said that she needs us to call her pharmacy today and tell them that she can pick this up today.  Please call Pt to discuss.  Thanks.

## 2023-01-09 RX ORDER — OXYCODONE HYDROCHLORIDE 30 MG/1
30 TABLET ORAL EVERY 4 HOURS PRN
Qty: 42 TABLET | Refills: 0 | Status: SHIPPED | OUTPATIENT
Start: 2023-01-09 | End: 2023-01-18

## 2023-01-09 NOTE — TELEPHONE ENCOUNTER
Pending Prescriptions:                       Disp   Refills    oxyCODONE IR (ROXICODONE) 30 MG tablet    42 tab*0            Sig: Take 1 tablet (30 mg) by mouth every 4 hours as           needed for severe pain (7-10) May fill 1/9/23           start 1/9/23(this is the remainder of #42 tabs)    Signed Prescriptions:                        Disp   Refills    oxyCODONE IR (ROXICODONE) 30 MG tablet     84 tab*0        Sig: Take 1 tablet (30 mg) by mouth every 4 hours as           needed for severe pain (7-10) May fill 1/8/23 for           start 1/9/23  Authorizing Provider: ADRI DIXON

## 2023-01-16 DIAGNOSIS — G89.4 CHRONIC PAIN DISORDER: ICD-10-CM

## 2023-01-16 RX ORDER — CYCLOBENZAPRINE HCL 10 MG
10 TABLET ORAL 4 TIMES DAILY
Qty: 360 TABLET | Refills: 1 | Status: SHIPPED | OUTPATIENT
Start: 2023-01-16 | End: 2023-06-08

## 2023-01-16 NOTE — TELEPHONE ENCOUNTER
Received fax from pharmacy requesting refill(s) for     cyclobenzaprine (FLEXERIL) 10 MG tablet    Date last filled 12.20.2022    Last Appt Date:12.14.2022    Next Appt scheduled: ns    Pharmacy:      [facility] #[id number] - [place], [place] - [address] [address] [place]      Will route for processing    Ginette Ford  [facility] Visit Facilitator

## 2023-01-18 ENCOUNTER — TELEPHONE (OUTPATIENT)
Dept: PALLIATIVE MEDICINE | Facility: OTHER | Age: 63
End: 2023-01-18
Payer: COMMERCIAL

## 2023-01-18 DIAGNOSIS — R07.1 CHEST PAIN ON BREATHING: ICD-10-CM

## 2023-01-18 RX ORDER — OXYCODONE HYDROCHLORIDE 30 MG/1
30 TABLET ORAL EVERY 4 HOURS PRN
Qty: 84 TABLET | Refills: 0 | Status: SHIPPED | OUTPATIENT
Start: 2023-01-18 | End: 2023-02-03

## 2023-01-18 NOTE — TELEPHONE ENCOUNTER
Called patient to schedule a nurse visit as her annuals are over due. I received a voicemail with no personal identifiers. Left a message stating my name, calling from [facility], and asking to please call back and schedule an appt for annuals at [phone], asking for Ginette Ford or the  schedulers.     Notified the  that we are hoping for a call back and was informed she is hard to reach and that they have been trying. She also has not scheduled her follow up.     Ginette Ford   Visit Facilitator  [facility]   Pain Management

## 2023-01-18 NOTE — TELEPHONE ENCOUNTER
Pending Prescriptions:                       Disp   Refills    oxyCODONE IR (ROXICODONE) 30 MG tablet    84 tab*0            Sig: Take 1 tablet (30 mg) by mouth every 4 hours as           needed for severe pain (7-10) May fill 1/20/23    Cub

## 2023-01-18 NOTE — TELEPHONE ENCOUNTER
Prior Auth has been started for oxyCODONE IR (ROXICODONE) 30 MG tablet    Key [id number]  Last Name Suzi RAMIREZB 1960    oxyCODONE IR (ROXICODONE) 30 MG tablet 84 tablet 0 2023  No   Sig - Route: Take 1 tablet (30 mg) by mouth every 4 hours as needed for severe pain (7-10) May fill 23 - Oral   Sent to pharmacy as: oxyCODONE HCl 30 MG Oral Tablet (ROXICODONE)   Class: E-Prescribe   Earliest Fill Date: 2023   Order: [id number]

## 2023-01-18 NOTE — TELEPHONE ENCOUNTER
"Received call from patient requesting refill(s) of oxyCODONE IR (ROXICODONE) 30 MG tablet    Note: \"Please send new Rx for #84 tablets to begin normal every two week  on Fridays from our pharmacy, to begin 01.20.2023. Thank you\"    Last dispensed from pharmacy on 01.13.2023 (The last 42 of the previous script)    Patient's last office/virtual visit by prescribing provider on 12.14.2022  Next office/virtual appointment scheduled for ns    UDT/CSA 12.14.2021 (Patient will be called to set up a nurse visit for asap)    E-prescribe to    [facility] #[id number] - [place], [place] - [address] [address] [place]      Will route to nursing pool for review and preparation of prescription(s).       "

## 2023-01-19 NOTE — TELEPHONE ENCOUNTER
Notified pt this prescription already at the pharmacy. Advised pt to come in to clinic for CSA.    oxyCODONE IR (ROXICODONE) 30 MG tablet 84 tablet 0 1/18/2023  No   Sig - Route: Take 1 tablet (30 mg) by mouth every 4 hours as needed for severe pain (7-10) May fill 1/20/23 - Oral   Sent to pharmacy as: oxyCODONE HCl 30 MG Oral Tablet (ROXICODONE)   Class: E-Prescribe     Pharmacy    [facility] #[id number] - [place], [place] - [address] [address] [place]

## 2023-01-19 NOTE — TELEPHONE ENCOUNTER
[facility] Call Center    Phone Message    May a detailed message be left on voicemail: yes     Reason for Call: Other: Patient is calling to request a refill for  oxyCODONE IR (ROXICODONE) 30 MG tablet again, she would like to  on Friday 1/20/23. Patient stated that she will be calling on Wednesday next week to scheduled her appointment. Please call patient back with update on medication refill.    Action Taken: Message routed to:  Other: [facility]    Travel Screening: Not Applicable

## 2023-01-19 NOTE — TELEPHONE ENCOUNTER
pharmacist from [facility] called to check status on pa - information pa rec'd have not been submitted - Pharmacist said sent req to clinic as urgent patient will need this for  tomorrow

## 2023-01-19 NOTE — TELEPHONE ENCOUNTER
Central Prior Authorization Team - Phone: [phone]     PA Initiation    Medication: oxyCODONE IR (ROXICODONE) 30 MG tablet- PA INITIATED  Insurance Company:    Pharmacy Filling the Rx: [facility] #[id number] - [place], [place] - [address]  Filling Pharmacy Phone: [phone]  Filling Pharmacy Fax:    Start Date: 1/19/2023

## 2023-01-19 NOTE — TELEPHONE ENCOUNTER
RX is at the pharmacy  Unclear why patient reports she will call next Wednesday to schedule an apt  Patient needs to come in to the clinic to provide an updated UDT/CSA for as soon as possible.

## 2023-01-19 NOTE — TELEPHONE ENCOUNTER
Central Prior Authorization Team - Phone: [phone]     Prior Authorization Approval    Authorization Effective Date: 12/20/2022  Authorization Expiration Date: 1/19/2024  Medication: oxyCODONE IR (ROXICODONE) 30 MG tablet- PA APPROVED  Approved Dose/Quantity: 84  Reference #:     Insurance Company:    Expected CoPay:       CoPay Card Available:      Foundation Assistance Needed:    Which Pharmacy is filling the prescription (Not needed for infusion/clinic administered): [facility] #[id number] - [place], [place] - [address] [address]  Pharmacy Notified: Yes  Patient Notified: YesComment:  pharmacy will notify when ready

## 2023-02-02 ENCOUNTER — TELEPHONE (OUTPATIENT)
Dept: PALLIATIVE MEDICINE | Facility: OTHER | Age: 63
End: 2023-02-02

## 2023-02-02 ENCOUNTER — MYC REFILL (OUTPATIENT)
Dept: PALLIATIVE MEDICINE | Facility: OTHER | Age: 63
End: 2023-02-02
Payer: COMMERCIAL

## 2023-02-02 DIAGNOSIS — R07.1 CHEST PAIN ON BREATHING: ICD-10-CM

## 2023-02-02 RX ORDER — OXYCODONE HYDROCHLORIDE 30 MG/1
30 TABLET ORAL EVERY 4 HOURS PRN
Qty: 84 TABLET | Refills: 0 | Status: CANCELLED | OUTPATIENT
Start: 2023-02-02

## 2023-02-02 NOTE — TELEPHONE ENCOUNTER
Received call from patient requesting refill(s) oxyCODONE IR (ROXICODONE) 30 MG tablet    Last dispensed from pharmacy on 01/20/2023    Patient's last office/virtual visit by prescribing provider on 12/14/2022  Next office/virtual appointment scheduled for None    Last urine drug screen date 12/14/2021  Current opioid agreement on file (completed within the last year) Yes Date of opioid agreement: 12/14/2021    E-prescribe to      [id number] - [place], [place] - [address] [address] [place]    Will route to nursing pool for review and preparation of prescription(s).     Pt stated that she want this tomorrow 2/3/2023 writer did let Pt know that Opioid do take up to 5-7 days. Pt understood what writer said.    Nguyen Ann MA  [facility]

## 2023-02-02 NOTE — TELEPHONE ENCOUNTER
Will keep encounter open at this time for pt communication and nursing response.    India CHRISTIE RN Care Coordinator  [facility]        Medication Renewal Request  2/2/2023 3:28 PM Reply    To: PAIN NURSE      From: Elvin Arias      Created: 2/2/2023 3:28 PM        *-*-*This message has not been handled.*-*-*    Refills have been requested for the following medications:         oxyCODONE IR (ROXICODONE) 30 MG tablet [ADRI DIXON]      Patient Comment:  2/3/23      Preferred pharmacy: [facility] #[id number] - [place], [place] - [address] [address] [place]

## 2023-02-02 NOTE — TELEPHONE ENCOUNTER
Received call from patient requesting refill(s) of oxyCODONE IR (ROXICODONE) 30 MG tablet     Last dispensed from pharmacy on 01/20/23    Patient's last office/virtual visit by prescribing provider on 12/14/22  Next office/virtual appointment scheduled for None    Last urine drug screen date 12/14/21  Current opioid agreement on file (completed within the last year) No Date of opioid agreement: None    E-prescribe to   [facility] #[id number] - [place], [place] - [address] [address] [place]  [address] [address] [place] [address]  Phone: [phone] Fax: [phone]    Will route to nursing pool for review and preparation of prescription(s).       Anali White MA  [facility] [place]

## 2023-02-02 NOTE — TELEPHONE ENCOUNTER
[facility] Call Center    Phone Message    May a detailed message be left on voicemail: yes     Reason for Call: Medication Refill Request    Has the patient contacted the pharmacy for the refill? Yes   Name of medication being requested: oxyCODONE IR (ROXICODONE) 30 MG tablet  Provider who prescribed the medication: Thanh Berry MD  Pharmacy: [facility] #[id number] - [place], [place] - [address] [address] [place]  Date medication is needed: ASAP   Pt stated that she want this tomorrow 2/3/2023 writer did let Pt know that Opioid do take up to 5-7 days. Pt understood what writer said.      Action Taken: Message routed to:  Other: [facility]    Travel Screening: Not Applicable

## 2023-02-03 RX ORDER — OXYCODONE HYDROCHLORIDE 30 MG/1
30 TABLET ORAL EVERY 4 HOURS PRN
Qty: 84 TABLET | Refills: 0 | Status: SHIPPED | OUTPATIENT
Start: 2023-02-03 | End: 2023-02-20

## 2023-02-03 NOTE — TELEPHONE ENCOUNTER
Pending Prescriptions:                       Disp   Refills    oxyCODONE IR (ROXICODONE) 30 MG tablet    84 tab*0            Sig: Take 1 tablet (30 mg) by mouth every 4 hours as           needed for severe pain (7-10) May fill 2/15/23           for start on or after 2/17/23    Cub

## 2023-02-15 ENCOUNTER — TELEPHONE (OUTPATIENT)
Dept: PALLIATIVE MEDICINE | Facility: OTHER | Age: 63
End: 2023-02-15

## 2023-02-15 ENCOUNTER — LAB (OUTPATIENT)
Dept: LAB | Facility: CLINIC | Age: 63
End: 2023-02-15
Payer: COMMERCIAL

## 2023-02-15 ENCOUNTER — DOCUMENTATION ONLY (OUTPATIENT)
Dept: LAB | Facility: CLINIC | Age: 63
End: 2023-02-15

## 2023-02-15 DIAGNOSIS — Z79.891 LONG TERM (CURRENT) USE OF OPIATE ANALGESIC: Primary | ICD-10-CM

## 2023-02-15 DIAGNOSIS — Z79.891 LONG TERM (CURRENT) USE OF OPIATE ANALGESIC: ICD-10-CM

## 2023-02-15 LAB — CANNABINOIDS UR QL SCN: NORMAL

## 2023-02-15 PROCEDURE — 2894A LABORATORY MISCELLANEOUS ORDER: CPT

## 2023-02-15 PROCEDURE — 80307 DRUG TEST PRSMV CHEM ANLYZR: CPT

## 2023-02-15 PROCEDURE — 99000 SPECIMEN HANDLING OFFICE-LAB: CPT

## 2023-02-15 NOTE — TELEPHONE ENCOUNTER
[facility] Call Center    Phone Message    May a detailed message be left on voicemail: yes     Reason for Call: Medication Refill Request      Has the patient contacted the pharmacy for the refill? Yes     Name of medication being requested:     oxyCODONE IR (ROXICODONE) 30 MG tablet    Provider who prescribed the medication: Thanh Berry    Pharmacy: [facility]    Date medication is needed: ASAP, Pharmacy said that Pt wants to  this prescription today.  Pharmacy said that its not due until this Friday the 17th.  Pharmacy states that Dr. Berry always puts the date in wrong.  Please call pharmacy back clarify.  Thanks.

## 2023-02-16 DIAGNOSIS — C85.10 HIGH GRADE B-CELL LYMPHOMA (H): ICD-10-CM

## 2023-02-16 LAB
CREAT UR-MCNC: 15 MG/DL
ETHYL GLUCURONIDE UR QL SCN: POSITIVE NG/ML
Lab: NORMAL
PERFORMING LABORATORY: NORMAL
SPECIMEN STATUS: NORMAL
TEST NAME: NORMAL

## 2023-02-17 RX ORDER — ONDANSETRON 8 MG/1
TABLET, ORALLY DISINTEGRATING ORAL
Qty: 30 TABLET | Refills: 0 | Status: SHIPPED | OUTPATIENT
Start: 2023-02-17 | End: 2023-03-28

## 2023-02-18 LAB
ETHYL GLUCURONIDE UR CFM-MCNC: 1305 NG/ML
ETHYL SULFATE UR CFM-MCNC: 264 NG/ML

## 2023-02-27 ENCOUNTER — TELEPHONE (OUTPATIENT)
Dept: PALLIATIVE MEDICINE | Facility: OTHER | Age: 63
End: 2023-02-27

## 2023-02-27 NOTE — TELEPHONE ENCOUNTER
[facility] Call Center    Phone Message    May a detailed message be left on voicemail: yes     Reason for Call: Medication Refill Request      Has the patient contacted the pharmacy for the refill? Yes     Name of medication being requested:     Oxycodone    Provider who prescribed the medication: Thanh Berry    Pharmacy: [facility]    Date medication is needed: ASAP

## 2023-02-27 NOTE — TELEPHONE ENCOUNTER
Received call from patient requesting refill(s) of Oxycodone 30 mg    Last dispensed from pharmacy on 2/17 #37 tabs for 6 days    Patient's last office/virtual visit by prescribing provider on 2/14  Next office/virtual appointment scheduled for 2/28-Video visit     UDT--2/15/23  CSA = 12/14/2021-overdue.  Will mail for pt to sign and return    [facility] pharmacy [facility]      Note from 2/20/23:  Attempted to call patient to discuss urine drug testing with alcohol.  Voicemail full.  Will not refill medications until this is discussed.    Did not queue and notified pt it won't be filled until  speaks with her. Pt has follow-up appt tomorrow.

## 2023-02-27 NOTE — TELEPHONE ENCOUNTER
[facility]    Phone Message    May a detailed message be left on voicemail: yes     Reason for Call: Pt said that she needs to renew a controlled substance contract.  She is requesting a call back to discuss or she wants to know if she can do it tomorrow.  She is scheduled for a video visit with Dr. Berry tomorrow at 11:30.  Thanks.

## 2023-02-28 LAB — LABCORP INTERFACED MISCELLANEOUS TEST RESULT: ABNORMAL

## 2023-02-28 NOTE — TELEPHONE ENCOUNTER
Appt was changed to in-clinic. Schedulers have been attempting to reach the pt to confirm she'll be in-clinic. She can sign her agreement when at her visit.

## 2023-03-04 ENCOUNTER — TELEPHONE (OUTPATIENT)
Dept: PALLIATIVE MEDICINE | Facility: OTHER | Age: 63
End: 2023-03-04
Payer: COMMERCIAL

## 2023-03-04 NOTE — TELEPHONE ENCOUNTER
Patient called via answering service at 12:00 pm on March 4, 2023 to request a refill of pain medications. I asked the on call  to let her know that we require a 5-7 business day notice for refills and we do not provide refills over the weekend. Review of chart shows that Dr Berry and his staff have been trying to reach her for several days but her voicemail is full. Per notes, she needs to be seen prior to any refills.     Will route to Dr Berry as an FYI.     DENZEL Gale, NP-C  [facility]

## 2023-03-06 ENCOUNTER — TELEPHONE (OUTPATIENT)
Dept: PALLIATIVE MEDICINE | Facility: OTHER | Age: 63
End: 2023-03-06

## 2023-03-06 DIAGNOSIS — G89.4 CHRONIC PAIN SYNDROME: Primary | ICD-10-CM

## 2023-03-06 RX ORDER — OXYCODONE HYDROCHLORIDE 30 MG/1
30 TABLET ORAL
Qty: 35 TABLET | Refills: 0 | Status: SHIPPED | OUTPATIENT
Start: 2023-03-06 | End: 2023-03-08

## 2023-03-06 NOTE — TELEPHONE ENCOUNTER
"See patient's call for refill of oxycodone.  I attempted to call the patient and phone rang several times and stopped.  I was not able to leave a message.  As such I called the patient's .  He had no particular concerns about how she is doing.  I indicated I was trying to reach the patient discussed the urine drug test regarding alcohol.  He acknowledges that he had gone out to dinner and she did, she was close to the time when they would obtain another urine drug test.  He does not any concerns about her use of alcohol.  He does note she has been on the \"couch curled up\" over the last week weekend, taking frequent showers I informed him that she has been scheduled for an appointment in the office that she did not come into.  He noted she had called earlier in the week and the  said she could do a virtual visit.  We discussed central scheduling may have commented on that though my  staff had sent to her message that morning it needed to be in the office as the last one has been virtual and  was able to tell she had read that note before the appointment did not come in.  I indicated I would begin a tapering schedule with going from 6 to 5 tablets a day for a week till she can get in to address communication around the use of this medicine.  He indicated his understanding  "

## 2023-03-06 NOTE — TELEPHONE ENCOUNTER
Note that patient called the on-call provider over the weekend requesting refill of opioids.  Was informed refills of opioids or not done on the weekend.    I have tried to reach the patient to review the last urine drug test positive for alcohol.  Her phone messages were full.    Patient had been scheduled for an appointment in the office last week.  Patient had apparently requested it be video.  Staff has sent MyChart messages as her phone mail was full indicating it needed to be an office visit as we are alternating office and video visits.  Staff report the patient did read that morning that it needed in office.  Did not contact the office and is listed as a no-show.  Did not contact us last week.

## 2023-03-06 NOTE — TELEPHONE ENCOUNTER
Received call from patient  requesting refill(s) for      OXYCODONE HCL (IR) 30 MG TAB    Last dispensed from pharmacy on 2/17/22 #84    Patient's last office/virtual visit by prescribing provider on 12/14/22  Next office/virtual appointment scheduled for 4/18/23    Last urine drug screen date 2/15/23  Current opioid agreement on file? NO Date of opioid agreement: 10/23/2019    E-prescribe to:     [id number] - [place], [place] - [address] [address]. [place]    Will route to nursing pool for review and preparation of prescription(s).

## 2023-03-07 ENCOUNTER — TELEPHONE (OUTPATIENT)
Dept: PALLIATIVE MEDICINE | Facility: OTHER | Age: 63
End: 2023-03-07
Payer: COMMERCIAL

## 2023-03-07 NOTE — TELEPHONE ENCOUNTER
[facility]    Phone Message    May a detailed message be left on voicemail: yes     Reason for Call: Medication Refill Request    Has the patient contacted the pharmacy for the refill? Yes   Name of medication being requested: oxyCODONE IR (ROXICODONE) 30 MG tablet  Provider who prescribed the medication: Dr. Berry  Pharmacy:[id number] - [place], [place] - [address] [address]. [place]  Date medication is needed: 3/10  for 3/12 start      Action Taken: Message routed to:  Other: [facility]    Travel Screening: Not Applicable

## 2023-03-07 NOTE — TELEPHONE ENCOUNTER
Called pt as requested by Dr. Berry and advised her Oxycodone refill was sent to [facility] pharmacy, and that he tried to call her to inform that she will be on weekly scripts until he sees her, but the call wouldn't go through. Pt voiced understanding of the plan.

## 2023-03-07 NOTE — TELEPHONE ENCOUNTER
[facility]    Phone Message    May a detailed message be left on voicemail: yes     Reason for Call: Other: Pt called and would like a call back please.     Action Taken: Message routed to:  Other: [facility]    Travel Screening: Not Applicable                                                                       Noted and appreciated. Will await lab results.

## 2023-03-08 DIAGNOSIS — G89.4 CHRONIC PAIN SYNDROME: ICD-10-CM

## 2023-03-08 RX ORDER — OXYCODONE HYDROCHLORIDE 30 MG/1
30 TABLET ORAL
Qty: 35 TABLET | Refills: 0 | Status: SHIPPED | OUTPATIENT
Start: 2023-03-08 | End: 2023-03-08

## 2023-03-08 RX ORDER — OXYCODONE HYDROCHLORIDE 30 MG/1
30 TABLET ORAL
Qty: 35 TABLET | Refills: 0 | Status: SHIPPED | OUTPATIENT
Start: 2023-03-08 | End: 2023-03-10

## 2023-03-08 NOTE — TELEPHONE ENCOUNTER
Call back to pharmacy:  There is confusion as to why the next rx is being allowed to fill on 3/11/23 as this should be fillable on 3/13/23 for start on 3/14/23.  Patient picked up an RX yesterday to start 3/7/23 which is a 7 day fill.    Request to discard/deactivate the current RX with fill date of 3/11/23  Will cue a new RX with fill date of 3/13/23  Patient is currently on weekly scripts

## 2023-03-08 NOTE — TELEPHONE ENCOUNTER
Call back to pharmacy:  There is confusion as to why the next rx is being allowed to fill on 3/11/23 as this should be fillable on 3/13/23 for start on 3/14/23.  Patient picked up an RX yesterday to start 3/7/23 which is a 7 day fill.    Request to discard/deactivate the current RX with fill date of 3/11/23  Will cue a new RX with fill date of 3/13/23  Patient is currently on weekly scripts    Pending Prescriptions:                       Disp   Refills    oxyCODONE IR (ROXICODONE) 30 MG tablet    35 tab*0            Sig: Take 1 tablet (30 mg) by mouth 5 times daily May           fill 3/13/23 for start 3/14/23    Cub

## 2023-03-08 NOTE — TELEPHONE ENCOUNTER
[facility]    Phone Message    May a detailed message be left on voicemail: yes     Reason for Call: Other: Carina from the pharmacy is calling to see if we can change the prescription that was sent over to a 3 day supply to get the patient back on track with filling on Fridays to make sure they have the medications when they are needed for the patient.    Action Taken: Message routed to:  Other: [facility]    Travel Screening: Not Applicable

## 2023-03-10 DIAGNOSIS — R07.89 CHEST WALL PAIN: ICD-10-CM

## 2023-03-10 RX ORDER — MAGNESIUM OXIDE 400 MG/1
400 TABLET ORAL 3 TIMES DAILY
Qty: 90 TABLET | Refills: 4 | Status: SHIPPED | OUTPATIENT
Start: 2023-03-10 | End: 2024-02-27

## 2023-03-10 RX ORDER — OXYCODONE HYDROCHLORIDE 30 MG/1
30 TABLET ORAL
Qty: 35 TABLET | Refills: 0 | Status: SHIPPED | OUTPATIENT
Start: 2023-03-10 | End: 2023-03-14

## 2023-03-10 NOTE — TELEPHONE ENCOUNTER
[facility]    Phone Message    May a detailed message be left on voicemail: yes     Reason for Call: Other: Patient called stating she is certain her medication should be started on Monday 3/13/2023. She states she will take her last dose on [place] 3/12/2023. Patient is requesting to speak with  or nurse as soon as possible.    Action Taken: Message routed to:  Other: [facility]    Travel Screening: Not Applicable

## 2023-03-10 NOTE — TELEPHONE ENCOUNTER
Patient calling, reports she should be able to pick her script up on fridays.  Pharmacy had called the clinic previously and reported that patient picked up on 3/7/23 and questioned the next refill being allowed to fill on 3/10/23.  RX was then changed to reflect the appropriate 7 day fill dispense date.  Patient feels this should still be allowed to fill on a Friday which would be 4 days post  as patient actually picked up on 3/6/23.  Will relay this information to the provider to determine what is most appropriate in this situation.  Either way patient will need a new RX based on recent fill date of 3/6 and not 3/7

## 2023-03-10 NOTE — TELEPHONE ENCOUNTER
Refill request:  Mag oxide 400 mg  Pending Prescriptions:                       Disp   Refills    magnesium oxide (MAG-OX) 400 MG tablet    90 tab*4            Sig: Take 1 tablet (400 mg) by mouth 3 times daily    Cub

## 2023-03-10 NOTE — TELEPHONE ENCOUNTER
Per provider:  Dates to remain as appropriate according to 7 day fill  New RX cued according to filling last RX on 3/6/23    Pending Prescriptions:                       Disp   Refills    oxyCODONE IR (ROXICODONE) 30 MG tablet    35 tab*0            Sig: Take 1 tablet (30 mg) by mouth 5 times daily           Dispense 3/12/23 for start 3/13/23    Signed Prescriptions:                        Disp   Refills    oxyCODONE IR (ROXICODONE) 30 MG tablet     35 tab*0        Sig: Take 1 tablet (30 mg) by mouth 5 times daily May fill           3/13/23 for start 3/14/23  Authorizing Provider: ADRI DIXON

## 2023-03-14 ENCOUNTER — MYC REFILL (OUTPATIENT)
Dept: PALLIATIVE MEDICINE | Facility: OTHER | Age: 63
End: 2023-03-14
Payer: COMMERCIAL

## 2023-03-14 DIAGNOSIS — G89.4 CHRONIC PAIN SYNDROME: ICD-10-CM

## 2023-03-15 RX ORDER — OXYCODONE HYDROCHLORIDE 30 MG/1
30 TABLET ORAL
Qty: 35 TABLET | Refills: 0 | Status: SHIPPED | OUTPATIENT
Start: 2023-03-15 | End: 2023-03-21
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
## 2023-03-15 NOTE — TELEPHONE ENCOUNTER
Received call from patient requesting refill(s) oxyCODONE IR (ROXICODONE) 30 MG tablet     Last dispensed from pharmacy on 03/06/2023    Patient's last office/virtual visit by prescribing provider on 12/14/2022  Next office/virtual appointment scheduled for 04/18/2023    Last urine drug screen date 02/15/2023  Current opioid agreement on file (completed within the last year) Yes Date of opioid agreement: 02/28/2023    E-prescribe to      [facility] #[id number] - [place], [place] - [address] [place]    Patient Comment: Per previous discussion. Pick-up Friday. No start date otherwise unable to  friday.     Will route to nursing pool for review and preparation of prescription(s).     Nguyen Ann MA  [facility] [place]

## 2023-03-21 DIAGNOSIS — G89.4 CHRONIC PAIN SYNDROME: ICD-10-CM

## 2023-03-21 RX ORDER — OXYCODONE HYDROCHLORIDE 30 MG/1
30 TABLET ORAL
Qty: 35 TABLET | Refills: 0 | Status: SHIPPED | OUTPATIENT
Start: 2023-03-21 | End: 2023-03-22

## 2023-03-22 DIAGNOSIS — G89.4 CHRONIC PAIN SYNDROME: ICD-10-CM

## 2023-03-22 NOTE — TELEPHONE ENCOUNTER
[facility]    Phone Message    May a detailed message be left on voicemail: yes     Reason for Call: Other: Patient is calling requesting a call back to verify information about her medication refill. Please call back to discuss further.     Action Taken: Message routed to:  Other: [facility]    Travel Screening: Not Applicable

## 2023-03-22 NOTE — TELEPHONE ENCOUNTER
Last refill dates are: dispense 3/19/23 for start 3/20/23  It has been discussed with pharmacy that the refills will be set up to fill on every 6th day and start on every 7th day  Unclear why this RX is now dated to dispense 3/26/23 but to start on 3/29/23    Will adjust the RX to reflect the planned dates  Pending Prescriptions:                       Disp   Refills    oxyCODONE IR (ROXICODONE) 30 MG tablet    35 tab*0            Sig: Take 1 tablet (30 mg) by mouth 5 times daily           Dispense 3/26/23 for 3/27/23. Discard RX with           start date of 3/29/23

## 2023-03-22 NOTE — TELEPHONE ENCOUNTER
A new rx has been sent to the provider to revise start date which will resolve the issue at the pharmacy.  Awaiting provider response at this time

## 2023-03-22 NOTE — TELEPHONE ENCOUNTER
[facility]    Phone Message    May a detailed message be left on voicemail: yes     Reason for Call: Medication Question or concern regarding medication   Prescription Clarification  Name of Medication: oxyCODONE IR (ROXICODONE) 30 MG tablet  Prescribing Provider: Thanh Berry MD   Pharmacy: [facility] #[id number] - [place], [place] - [address] [address] [place]   What on the order needs clarification? Rx said that Pt could  on 3/26/2023 to start 3/29*/2023 but the usual start date would be 3/27/2023 Please call Pharmacy to clarify.          Action Taken: Message routed to:  Other: [facility]    Travel Screening: Not Applicable

## 2023-03-23 RX ORDER — OXYCODONE HYDROCHLORIDE 30 MG/1
30 TABLET ORAL
Qty: 35 TABLET | Refills: 0 | Status: SHIPPED | OUTPATIENT
Start: 2023-03-23 | End: 2023-03-31

## 2023-03-23 NOTE — TELEPHONE ENCOUNTER
Signing for my colleague, Dr Berry, who is currently out of office.     Script Eprescribed to pharmacy    Signed Prescriptions:                        Disp   Refills    oxyCODONE IR (ROXICODONE) 30 MG tablet     35 tab*0        Sig: Take 1 tablet (30 mg) by mouth 5 times daily Dispense           3/26/23 for 3/27/23. Discard RX with start date           of 3/29/23  Authorizing Provider: KAREN PATTON APRN, NP-C  [facility] [place]

## 2023-03-27 DIAGNOSIS — C85.10 HIGH GRADE B-CELL LYMPHOMA (H): ICD-10-CM

## 2023-03-28 RX ORDER — ONDANSETRON 8 MG/1
TABLET, ORALLY DISINTEGRATING ORAL
Qty: 30 TABLET | Refills: 0 | Status: SHIPPED | OUTPATIENT
Start: 2023-03-28 | End: 2023-05-02

## 2023-03-31 ENCOUNTER — TELEPHONE (OUTPATIENT)
Dept: PALLIATIVE MEDICINE | Facility: OTHER | Age: 63
End: 2023-03-31

## 2023-03-31 DIAGNOSIS — G89.4 CHRONIC PAIN SYNDROME: ICD-10-CM

## 2023-03-31 RX ORDER — OXYCODONE HYDROCHLORIDE 30 MG/1
30 TABLET ORAL
Qty: 35 TABLET | Refills: 0 | Status: SHIPPED | OUTPATIENT
Start: 2023-03-31 | End: 2023-04-04

## 2023-03-31 NOTE — CONFIDENTIAL NOTE
Refill request:  Oxycodone 30 mg: dispensed 3/26/23-35 tabs-7 days    Last apt(virtual): 12/14/23    UDT--2/15/23  CSA = 12/14/2021    Next apt: 4/18/23    Pending Prescriptions:                       Disp   Refills    oxyCODONE IR (ROXICODONE) 30 MG tablet    35 tab*0            Sig: Take 1 tablet (30 mg) by mouth 5 times daily           Dispense 4/2/23 for start 4/3/23    Cub

## 2023-03-31 NOTE — TELEPHONE ENCOUNTER
[facility]    Phone Message    May a detailed message be left on voicemail: yes     Reason for Call: Medication Refill Request    Has the patient contacted the pharmacy for the refill? Yes   Name of medication being requested: oxyCODONE IR (ROXICODONE) 30 MG tablet  Provider who prescribed the medication: Dr. Berry  Pharmacy: [facility] #[id number] - [place], [place] - [address] [address]. [place]  Date medication is needed: 4/2/2023    Reminded patient of 5-7 day refill request, patient states she does not need to call in advance due to weekly refills.     Action Taken: Message routed to:  Other: [facility]    Travel Screening: Not Applicable

## 2023-04-04 ENCOUNTER — TELEPHONE (OUTPATIENT)
Dept: PALLIATIVE MEDICINE | Facility: OTHER | Age: 63
End: 2023-04-04

## 2023-04-04 DIAGNOSIS — G89.4 CHRONIC PAIN SYNDROME: ICD-10-CM

## 2023-04-04 RX ORDER — OXYCODONE HYDROCHLORIDE 30 MG/1
30 TABLET ORAL
Qty: 35 TABLET | Refills: 0 | Status: SHIPPED | OUTPATIENT
Start: 2023-04-04 | End: 2023-04-12

## 2023-04-04 NOTE — TELEPHONE ENCOUNTER
[facility]    Phone Message    May a detailed message be left on voicemail: yes     Reason for Call: Medication Refill Request    Has the patient contacted the pharmacy for the refill? Yes   Name of medication being requested: oxyCODONE IR (ROXICODONE) 30 MG tablet  Provider who prescribed the medication: Dr. Berry  Pharmacy: [facility] #[id number] - [place], [place] - [address] [address]. [place]  Date medication is needed: 4/8/23    Patient is requesting to  her medication a day early due to the pharmacy being closed with coming weekend for Holiday hours.       Action Taken: Message routed to:  Other: [facility]    Travel Screening: Not Applicable

## 2023-04-04 NOTE — CONFIDENTIAL NOTE
Call placed to pharmacy:  Verified hours of the pharmacy with coming holiday  Sunday hours: 10-2 patient can  during these hours, she is on a 7 day fill due to multiple contract issues.

## 2023-04-12 ENCOUNTER — TELEPHONE (OUTPATIENT)
Dept: PALLIATIVE MEDICINE | Facility: OTHER | Age: 63
End: 2023-04-12

## 2023-04-12 DIAGNOSIS — G89.4 CHRONIC PAIN SYNDROME: ICD-10-CM

## 2023-04-12 RX ORDER — OXYCODONE HYDROCHLORIDE 30 MG/1
30 TABLET ORAL
Qty: 35 TABLET | Refills: 0 | Status: SHIPPED | OUTPATIENT
Start: 2023-04-12 | End: 2023-04-18

## 2023-04-12 NOTE — TELEPHONE ENCOUNTER
[facility]    Phone Message    May a detailed message be left on voicemail: yes     Reason for Call: Medication Refill Request    Has the patient contacted the pharmacy for the refill? Yes   Name of medication being requested: oxyCODONE IR (ROXICODONE) 30 MG tablet  Provider who prescribed the medication: Thanh Berry MD  Pharmacy: [facility] #[id number] - [place], [place] - [address] [address]. [place]  Date medication is needed: 4/14/2023 per patient      Action Taken: Message routed to:  Other: [facility]    Travel Screening: Not Applicable

## 2023-04-12 NOTE — TELEPHONE ENCOUNTER
Received call from patient requesting refill(s) of oxyCODONE IR (ROXICODONE) 30 MG tablet     Last dispensed from pharmacy on 04/09/23    Patient's last office/virtual visit by prescribing provider on 12/14/22  Next office/virtual appointment scheduled for 04/18/23    Last urine drug screen date 02/15/23  Current opioid agreement on file (completed within the last year) No Date of opioid agreement: None    E-prescribe to     [facility] #[id number] - [place] - [address] [address]. [place]  [address] [address] [place] [address]  Phone: [phone] Fax: [phone]    Will route to nursing pool for review and preparation of prescription(s).       Anali White MA  [facility] [place]

## 2023-04-12 NOTE — TELEPHONE ENCOUNTER
Pending Prescriptions:                       Disp   Refills    oxyCODONE IR (ROXICODONE) 30 MG tablet    35 tab*0            Sig: Take 1 tablet (30 mg) by mouth 5 times daily           Dispense 4/16/23 and start 4/17/23    Cub

## 2023-04-14 ENCOUNTER — TELEPHONE (OUTPATIENT)
Dept: PALLIATIVE MEDICINE | Facility: OTHER | Age: 63
End: 2023-04-14
Payer: COMMERCIAL

## 2023-04-17 DIAGNOSIS — R07.89 CHEST WALL PAIN: ICD-10-CM

## 2023-04-17 DIAGNOSIS — D47.02 MAST CELL DISEASE, SYSTEMIC: Chronic | ICD-10-CM

## 2023-04-17 RX ORDER — CELECOXIB 200 MG/1
CAPSULE ORAL
Qty: 60 CAPSULE | Refills: 3 | Status: SHIPPED | OUTPATIENT
Start: 2023-04-17 | End: 2024-02-27

## 2023-04-17 RX ORDER — FAMOTIDINE 20 MG/1
20 TABLET, FILM COATED ORAL 2 TIMES DAILY
Qty: 180 TABLET | Refills: 1 | Status: SHIPPED | OUTPATIENT
Start: 2023-04-17

## 2023-04-17 NOTE — TELEPHONE ENCOUNTER
Received fax from pharmacy requesting refill(s) for     celecoxib (CELEBREX) 200 MG capsule    Date last filled 03.10.2023    Last Appt Date:12.14.2022    Next Appt scheduled: 04.17.2023    Pharmacy:      [id number] - [place], [place] - [address] [address]. [place]    Will route for processing    Ginette Ford  [facility] Visit Facilitator

## 2023-04-17 NOTE — TELEPHONE ENCOUNTER
Received fax from pharmacy requesting refill(s) for  famotidine (PEPCID) 20 MG tablet      Date last filled 3/10/23    Last Appt Date:12/14/23    Next Appt scheduled: 4/18/23    Pharmacy:     [id number] - [place], [place] - [address] [address]. [place]      Will route for processing    Tiny Pulido MA  [facility] [place]

## 2023-04-18 ENCOUNTER — TELEPHONE (OUTPATIENT)
Dept: PALLIATIVE MEDICINE | Facility: OTHER | Age: 63
End: 2023-04-18

## 2023-04-18 ENCOUNTER — OFFICE VISIT (OUTPATIENT)
Dept: PALLIATIVE MEDICINE | Facility: OTHER | Age: 63
End: 2023-04-18
Payer: COMMERCIAL

## 2023-04-18 VITALS
OXYGEN SATURATION: 99 % | DIASTOLIC BLOOD PRESSURE: 65 MMHG | HEART RATE: 121 BPM | BODY MASS INDEX: 33.12 KG/M2 | WEIGHT: 199 LBS | SYSTOLIC BLOOD PRESSURE: 138 MMHG

## 2023-04-18 DIAGNOSIS — Z79.891 LONG TERM (CURRENT) USE OF OPIATE ANALGESIC: Primary | ICD-10-CM

## 2023-04-18 DIAGNOSIS — G89.4 CHRONIC PAIN SYNDROME: ICD-10-CM

## 2023-04-18 LAB
CANNABINOIDS UR QL SCN: NORMAL
CREAT UR-MCNC: 118 MG/DL
ETHANOL UR QL SCN: NORMAL

## 2023-04-18 PROCEDURE — 80320 DRUG SCREEN QUANTALCOHOLS: CPT | Mod: 59 | Performed by: ANESTHESIOLOGY

## 2023-04-18 PROCEDURE — 80355 GABAPENTIN NON-BLOOD: CPT | Performed by: ANESTHESIOLOGY

## 2023-04-18 PROCEDURE — G0480 DRUG TEST DEF 1-7 CLASSES: HCPCS | Mod: 59 | Performed by: ANESTHESIOLOGY

## 2023-04-18 PROCEDURE — 80307 DRUG TEST PRSMV CHEM ANLYZR: CPT | Performed by: ANESTHESIOLOGY

## 2023-04-18 PROCEDURE — G0463 HOSPITAL OUTPT CLINIC VISIT: HCPCS

## 2023-04-18 PROCEDURE — 99215 OFFICE O/P EST HI 40 MIN: CPT | Performed by: ANESTHESIOLOGY

## 2023-04-18 PROCEDURE — 80348 DRUG SCREENING BUPRENORPHINE: CPT | Performed by: ANESTHESIOLOGY

## 2023-04-18 PROCEDURE — 80357 KETAMINE AND NORKETAMINE: CPT | Mod: 59 | Performed by: ANESTHESIOLOGY

## 2023-04-18 RX ORDER — OXYCODONE HYDROCHLORIDE 30 MG/1
30 TABLET ORAL
Qty: 84 TABLET | Refills: 0 | Status: SHIPPED | OUTPATIENT
Start: 2023-04-18 | End: 2023-05-19

## 2023-04-18 ASSESSMENT — PAIN SCALES - GENERAL: PAINLEVEL: SEVERE PAIN (7)

## 2023-04-18 NOTE — PATIENT INSTRUCTIONS
----------------------------------------------------------------  Clinic Number:  [phone]   Call with any questions about your care and for scheduling assistance.   Calls are returned Monday through Friday between 8 AM and 4:30 PM. We usually get back to you within 2 business days depending on the issue/request.    If we are prescribing your medications:  For opioid medication refills, call the clinic or send a [employer] message 7 days in advance.  Please include:  Name of requested medication  Name of the pharmacy.  For non-opioid medications, call your pharmacy directly to request a refill. Please allow 3-4 days to be processed.   Per [place] State Law:  All controlled substance prescriptions must be filled within 30 days of being written.    For those controlled substances allowing refills, pickup must occur within 30 days of last fill.      We believe regular attendance is key to your success in our program!    Any time you are unable to keep your appointment we ask that you call us at least 24 hours in advance to cancel.This will allow us to offer the appointment time to another patient.   Multiple missed appointments may lead to dismissal from the clinic.  PLAN:  Resume oxycodone 30 mg every 4 hours, starting with present prescription, may fill next 4/21    Movantik for opioid-induced constipation 25 mg tablet 1 daily.    Discussed the use of Relistor as a intramuscular injection that you may obtain an emergency room or urgent care if needed.    You will contact your oncologist to have them assess your symptoms.  If not able to reach them contact Dr. Berry for a CT with contrast.    Continue with ketamine 20 mg 4 times a day as needed.    You may contact Leilani Woodruff for  energy work.  Her email is [email or web address]  May return to frequency specific microcurrent and acupuncture.    Follow-up with Dr. Berry in the office in 2 months

## 2023-04-18 NOTE — TELEPHONE ENCOUNTER
[facility]    Phone Message    May a detailed message be left on voicemail: yes     Reason for Call: Medication Question or concern regarding medication   Prescription Clarification  Name of Medication: oxyCODONE IR (ROXICODONE) 30 MG tablet  Prescribing Provider: Dr. Berry   Pharmacy: [facility] #[id number] - [place], [place] - [address] [address] [place]   What on the order needs clarification?   Patient was previously on 1 week refill supplies due to breaking a contract. The pharmacy is calling due to the change in the prescription that was sent over and the patient had just picked up a week supply of this medication this past Sunday 4/16/23. Please call pharmacy back to verify prescription and ask for a pharmacist.          Action Taken: Message routed to:  Other: [facility]    Travel Screening: Not Applicable

## 2023-04-18 NOTE — PROGRESS NOTES
Patient presents to the clinic today for a follow up with ADRI DIXON MD regarding Pain Management.        UDS/CSA-     Medications-     QUESTIONS:    Ginette Ford  [facility] Visit Facilitator
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
## 2023-04-19 NOTE — CONFIDENTIAL NOTE
Call placed to pharmacy  Reviewed with pharmacist that the current RX is written correctly as to how the provider wants this written, in the future the dates need to reflect when the next RX is actually due.

## 2023-04-20 LAB
OXYCODONE UR CFM-MCNC: ABNORMAL NG/ML
OXYCODONE/CREAT UR: ABNORMAL
OXYMORPHONE UR CFM-MCNC: >7000 NG/ML
OXYMORPHONE/CREAT UR: ABNORMAL

## 2023-04-27 ENCOUNTER — TELEPHONE (OUTPATIENT)
Dept: PALLIATIVE MEDICINE | Facility: OTHER | Age: 63
End: 2023-04-27
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
## 2023-04-27 NOTE — TELEPHONE ENCOUNTER
[facility] Call Center    Phone Message    May a detailed message be left on voicemail: yes     Reason for Call: Other: Pt spouse Yomi is returning Dr. Hayes call Yomi can be reached at [phone] Yomi would like a call back please leave voicemail with number Yomi can reach Dr. Berry at      Action Taken: Message routed to:  Other: [facility]    Travel Screening: Not Applicable

## 2023-04-27 NOTE — TELEPHONE ENCOUNTER
's call.  Pt in ICU at [facility] I did not see any information in care everywhere.  I did leave a message with the nurse for the house staff to call me
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
## 2023-05-05 ENCOUNTER — TELEPHONE (OUTPATIENT)
Dept: PALLIATIVE MEDICINE | Facility: OTHER | Age: 63
End: 2023-05-05
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
## 2023-05-11 NOTE — PROGRESS NOTES
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 She is currently undergoing treatment with DA-R-EPOCH. Her past medical history is significant for breast cancer in 2011 s/p bilateral mastectomies and BENJIE/BSO up until recently on Tamoxifen, papillary thyroid cancer s/p total thyroidectomy, chronic chest wall pain, and asthma. Briefly, her oncologic history is as follows:    She presented in 8/2017 with dramatically worse chest and back pain. CT 9/1/17 showed lytic lesion right 10th rib extending to right T9-10 neural foramen with vertebral body invasion. There was associated conglomerate of lymphadenopathy around the mid T-spine. She had a CT guided biopsy showing B-cell high grade lymphoma. Pathology showed \"The morphology shows a population of large cells, diffuse lymphoid infiltrate. The cells are atypical with abundant mitotic and apoptotic activity and single cell necrosis. Tumor is CD45 and CD79a positive and focally weakly CD30 positive. The other stains revealed positivity for CD20, BCL6, BCL2 and MUM1, and CD10 and CD21 negative. The CD5 and CD3 highlighted background T-cells.  MYC expression was equivocal with approximately 40% nuclei staining.  Ki-67 proliferative index is greater than 90-95%. The immunohistochemistry is suggestive of non-GCB immunophenotype of diffuse large B-cell lymphoma. The cytogenetics did not show rearrangement of the BCL2 or c-MYC. There is the presence of BCL6 rearrangement in 78% of cells and gains of MYC and BCL2, but no amplifications.\"     Staging LP and BMBx were negative for lymphoma.   She started DA-REPOCH 10/6/17. She did well with chemo other than rigors during CIVI.  Post cycle 1 complicated by mucositis and worsening of chronic LE peripheral edema.   Cycle 2 on 10/27/17. Due to a rise in her LD and uric acid " [id number]    VS: Temp: (!) 95.4  F (35.2  C) Temp src: Oral BP: 126/71 Pulse: 71   Resp: 16 SpO2: 95 % O2 Device: None (Room air)       O2: >90% on room air, denies SOB and CP, no cough present. Lung sounds clear.   Output: Voiding spontaneously and without difficulty, PVR after first void was 13ml   Last BM: 05/11/2023 prior to surgery per pt report   Activity: SBA w/ gait belt & walker, WBAT RLE. CPM on intermittently. Ambulated 500ft this shift   Skin: R) knee surgical incision, scab to R) hand   Pain: Managed with scheduled tylenol   Neuro: A & O x4, reports some numbness to RLE from spinal   Dressing: CDI, ace wrap in place   Diet: Regular   LDA: PIV infusing TKO in between IV abx   Equipment: Personal belongings, call light, walker, gait belt, IV pump/pole   Plan: Possible discharge to home tomorrow.   Additional Info:           levels on that admission day, she underwent a CT scan which showed response to therapy with improvement in her mediastinal lymphadenopathy and right chest wall mass.  Cycle 3 on 11/16      Cycle 4 12/7    Interval History:  She reports that overall since hospital discharge she has been doing okay. Does complain of discomfort around her port and under her breast. Reports that she recovered from this cycle of chemo more quickly than before.       Current Outpatient Prescriptions   Medication Sig Dispense Refill     furosemide (LASIX) 20 MG tablet Take 1 tablet (20 mg) by mouth 2 times daily 60 tablet 0     lidocaine-prilocaine (EMLA) cream Apply topically as needed for moderate pain 60 g 1     morphine (MS CONTIN) 30 MG 12 hr tablet Take 1 tablet (30 mg) by mouth every 8 hours . Take an addition pill at night such that your evening dose is 60mg 120 tablet 0     oxyCODONE IR (ROXICODONE) 30 MG tablet Take 1 tablet (30 mg) by mouth every 4 hours as needed for moderate to severe pain 180 tablet 0     acyclovir (ZOVIRAX) 400 MG tablet Take 1 tablet (400 mg) by mouth 2 times daily 60 tablet 0     potassium chloride SA (KLOR-CON) 20 MEQ CR tablet Take 1 tablet (20 mEq) by mouth 2 times daily 30 tablet 0     diazepam (VALIUM) 5 MG tablet TAKE ONE TABLET BY MOUTH THREE TIMES DAILY AS NEEDED FOR MUSCLE SPASMS 90 tablet 1     cromolyn sodium (NASALCROM) 5.2 MG/ACT AERS Inhaler SPRAY ONE SPRAY( 1 ML) IN NOSTRIL DAILY 1 Bottle 6     lidocaine-prilocaine (EMLA) cream Apply topically as needed (port access pain.) 30 g 1     [employer] FIBER GUMMY BEARS CHILDREN CHEW Take 5 g by mouth daily (2 gummy= 5 g =1 serving) 120 tablet 3     methocarbamol (ROBAXIN) 750 MG tablet Take 1 tablet (750 mg) by mouth 4 times daily . Ok to take a 5th Robaxin on very severe days.       diclofenac (VOLTAREN) 1 % GEL topical gel Apply affected area two times daily PRN using enclosed dosing card. 100 g 0     celecoxib (CELEBREX) 200 MG capsule Take 1 capsule  (200 mg) by mouth 2 times daily 56 capsule 0     ondansetron (ZOFRAN-ODT) 8 MG ODT tab Take 1 tablet (8 mg) by mouth every 8 hours as needed (Patient taking differently: Take 8 mg by mouth every 8 hours ) 30 tablet 1     cetirizine (ZYRTEC ALLERGY) 10 MG tablet Take 1 tablet (10 mg) by mouth 3 times daily On hold for lab test. 90 tablet 0     hydrochlorothiazide (MICROZIDE) 12.5 MG capsule Take 1 capsule (12.5 mg) by mouth daily 30 capsule 0     calcium citrate-vitamin D (CALCIUM CITRATE +D) 315-250 MG-UNIT TABS per tablet Take 2 tablets by mouth twice a week 20 tablet 0     cyclobenzaprine (FLEXERIL) 10 MG tablet Take 1 tablet (10 mg) by mouth 3 times daily as needed (Patient taking differently: Take 10 mg by mouth 3 times daily ) 30 tablet 0     ranitidine (ZANTAC) 75 MG tablet Take 1 tablet (75 mg) by mouth 3 times daily 90 tablet 0     Cholecalciferol (VITAMIN D3) 2000 UNITS CAPS Take 5,000 Units by mouth daily Takes 2 tabs daily 60 capsule 0     furosemide (LASIX) 20 MG tablet Take 1 tablet (20 mg) by mouth 2 times daily 60 tablet 0     calcitRIOL (ROCALTROL) 0.25 MCG capsule TAKE 2 CAPSULES BY MOUTH EVERY MORNING AND TAKE 1 CAPSULE BY MOUTH EVERY NIGHT AT BEDTIME (Patient taking differently: Take 0.25 mcg by mouth 2 times daily TAKE 2 CAPSULES BY MOUTH EVERY MORNING AND TAKE 1 CAPSULE BY MOUTH EVERY NIGHT AT NOON.) 90 capsule 0     cromolyn (OPTICROM) 4 % ophthalmic solution Place 1 drop into both eyes 4 times daily 10 mL 0     montelukast (SINGULAIR) 10 MG tablet Take 2 tablets (20 mg) by mouth 2 times daily 60 tablet 0     magic mouthwash suspension (diphenhydrAMINE, lidocaine, aluminum-magnesium & simethicone) Swish and spit 10 mLs in mouth every 6 hours as needed for mouth sores 360 mL 1     prochlorperazine (COMPAZINE) 10 MG tablet Take 10 mg by mouth as needed  3     SUMAtriptan (IMITREX) 50 MG tablet Take 50 mg by mouth as needed  3     acetaminophen (TYLENOL) 325 MG tablet Take 2 tablets (650 mg) by  mouth every 4 hours as needed for mild pain or fever 100 tablet      triamcinolone (KENALOG) 0.025 % ointment Apply topically as needed  3     lidocaine (XYLOCAINE) 5 % ointment Apply quarter size amount to chest and back up to 3 times daily as needed for pain. 350 g 1     bisacodyl (DULCOLAX) 5 MG EC tablet Take 3 tablets (15 mg) by mouth daily as needed for constipation 30 tablet 0     polyethylene glycol (MIRALAX) powder Take 17 g (1 capful) by mouth daily 510 g 0     senna-docusate (SENOKOT-S;PERICOLACE) 8.6-50 MG per tablet Take 1-2 tablets by mouth 2 times daily as needed for constipation 60 tablet 0     UNABLE TO FIND Take 2 capsules by mouth 3 times daily Muscle Mag. 2 caps contain B1 20mg, B2 20mg, B6 10mg, magesium 20mg, manganese 2mg.       order for DME Compression stockings, medium grade, please measure and fit. Please dispense a pair. 1 Units 0     COMPOUND CONTAINING CONTROLLED SUBSTANCE (CMPD RX) - PHARMACY TO MIX COMPOUNDED MEDICATION Apply small amount to affected area two times daily. 120 g 6     levothyroxine (SYNTHROID/LEVOTHROID) 112 MCG tablet Mon-Sat: (2 tabs daily) Sunday: 3 tabs (Patient taking differently: 112 mcg Mon-Sat: (2 tabs daily) Sunday: 3 tabs) 189 tablet 3     EPINEPHrine (EPIPEN 2-CARIDAD) 0.3 MG/0.3ML injection Inject 0.3 mLs (0.3 mg) into the muscle once as needed for anaphylaxis 0.6 mL 3     VENTOLIN  (90 BASE) MCG/ACT Inhaler INHALE 2 PUFFS INTO THE LUNGS EVERY 4 HOURS AS NEEDED FOR SHORTNESS OF BREATH OR DIFFICULT BREATHING OR WHEEZING 18 g 3     order for DME Equipment being ordered: compression stockings. 20-30 mm or 30 - 40 mm as patient can tolerate. Physical therapy to determine if they should be above or below the knee. 2 Units 4     order for DME Equipment being ordered: compression bra 2 Device 1     aluminum chloride (DRYSOL) 20 % external solution Apply topically At Bedtime 60 mL 3     UNABLE TO FIND 3 tablets 3 times daily MEDICATION NAME: calcium D-Glucarate    3 caps contain 180mg of elemental calcium.       Triamcinolone Acetonide (AZMACORT IN) Inhale 2 puffs into the lungs as needed       UNABLE TO FIND 2 tablets 3 times daily MEDICATION NAME: Digestzymes        UNABLE TO FIND 1 tablet daily MEDICATION NAME: Pure Encapsulations       Probiotic Product (PROBIOTIC DAILY PO) Take 1 capsule by mouth daily Lacto acid bifidobacterium       Physical Examination:  /61  Pulse 66  Temp 97.2  F (36.2  C)  Resp 16  SpO2 96%  Wt Readings from Last 10 Encounters:   02/23/18 89.1 kg (196 lb 8 oz)   02/01/18 86.9 kg (191 lb 8 oz)   01/29/18 81.1 kg (178 lb 14.4 oz)   01/24/18 86.3 kg (190 lb 4.8 oz)   01/22/18 87.2 kg (192 lb 3.9 oz)   01/18/18 88.2 kg (194 lb 8 oz)   01/15/18 86.1 kg (189 lb 14.4 oz)   01/09/18 82.3 kg (181 lb 8 oz)   01/03/18 85.7 kg (189 lb)   01/01/18 87.5 kg (193 lb)   Constitutional: NAD  Eyes:  PERRL. No scleral icterus.  ENT: Oral mucosa is moist without lesions or thrush.   Cardiovascular: Regular rate and rhythm. Port accessed without surrounding erythema or drainage  Respiratory: Clear to auscultation bilaterally. No wheezes or crackles.  Gastrointestinal: Bowel sounds +. Soft, nontender  Neurologic: normal speech, up to exam table unaided  Skin:No rashes noted.   Extremities: trace lower extremity edema bilaterally, compression stockings in place.    Laboratory Data:  No results found. However, due to the size of the patient record, not all encounters were searched. Please check Results Review for a complete set of results.PET scan 12/21  IMPRESSION:   1. In this patient with a history of diffuse large B-cell lymphoma,  breast cancer and papillary thyroid carcinoma there is evidence for  partial or complete response;      a. Markedly decreased size of right paravertebral soft tissue mass  involving the right 10th rib which is no longer metabolically active.      b. Decreased size of left mediastinal lymph node which is no  longer metabolically  "active.      c. No new lesions concerning for recurrence or metastasis.      d. Diffuse bone marrow FDG uptake likely related to bone marrow  reactivation.  2. Stable intra and extrahepatic biliary dilatation. There was no  laboratory correlation for obstruction on 12/19/2017. This most likely  represents reservoir effect status post cholecystectomy. There is a  surgical clip or stone at the cystic duct stump.  3. Moderate stool burden with fecalization of the contents within the  distal small bowel.  4. Nonobstructing 2 mm right renal stone.      Assessment and Plan:    DLBCL, \"double-hit\"-like, with c-myc overexpression but not re-arrangement. currently on treatment with DA-R-EPOCH  S/p 6 cycles   Complication include mild mucositis and mild infusion reaction (rigors), nausea and fatigue. ONly needed PRBC once. No infecions.     Interval CT CAP after 1 cycle due to elevated LD and uric acid showed response to treatment.  PET scan with CR with no PET avidity on 12/21 and EOT PET on 2/27 confirmed CR.    Elvin had a post-treatment/end of treatment CT/PET on 02/27, which showed a complete response with resolution of all sites of disease.  The bony area along the tenth rib is stable but has very low level FDG with an SUV of 4.4.  There are no new suspicious osseous lesions.  I am pleased to share with her that she is in CR.      The plan is to start observation.  She will see me back in 3 months with a CT of the chest, abdomen and pelvis, and I will follow her clinically.  I recommended to stay on acyclovir for 2 more months and then stop.  I also encouraged her to do the Lymphedema Clinic to help with the swelling and continue Lasix 20 mg b.i.d. for her lower extremity edema.  She can stop her Levaquin and fluconazole and all other supportive care medications.  Follow up with Dr. Castro for hypothyroidism. I contacted  who recommended to start Tamoxifen at this time.     Continue ppx ACV 400mg BID for " another month and then ok to stop.   History of Rachael en Y gastric bypass  Continue supplements (calcium citrate-vitamin D, vitamin D3, magnesium citrate). Also had iron deficiency anemia likely from poor absorption. S/o iron supplementation. Will monitor Hgb.    Chronic chest wall pain s/p mastectomies  Follows with Dr. Benitez. Palliative care to continue to prescribe pain medications. Taking MS Contin, Oxycodone for breakthrough pain, and celebrex. Continued to encourage pt to wean down on pain meds as tolerated. She appears lethargic  at times during clinica visit.      Garima Sauceda MD  Associate Professor of Medicine  [phone]

## 2023-05-17 ENCOUNTER — TELEPHONE (OUTPATIENT)
Dept: PALLIATIVE MEDICINE | Facility: OTHER | Age: 63
End: 2023-05-17

## 2023-05-17 DIAGNOSIS — G89.4 CHRONIC PAIN SYNDROME: ICD-10-CM

## 2023-05-17 RX ORDER — OXYCODONE HYDROCHLORIDE 30 MG/1
30 TABLET ORAL
Qty: 84 TABLET | Refills: 0 | Status: CANCELLED | OUTPATIENT
Start: 2023-05-17

## 2023-05-17 NOTE — TELEPHONE ENCOUNTER
[facility] Call Center    Phone Message    May a detailed message be left on voicemail: yes     Reason for Call: Medication Refill Request      Has the patient contacted the pharmacy for the refill? Yes     Name of medication being requested:     oxyCODONE IR (ROXICODONE) 30 MG tablet    Oxycodone 15 MG    Provider who prescribed the medication: Thanh Berry    Pharmacy: [facility]    Date medication is needed: ASAP

## 2023-05-17 NOTE — TELEPHONE ENCOUNTER
Called Elvin because I did not see that she was on the Oxycodone 15 MG. She informed me that she was recently discharged from the hospital and was prescribed this medication the oxycodone and the staffing was in contact with DR. Berry or Elvin was about getting a refill of this medication too. I am forwarding this refill request to you being I do not see this on her med list and she would like to speak to the nursing staff that is with DR. Berry team. Thank you very much.     Nguyen Ann MA  [facility]
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
## 2023-05-19 RX ORDER — OXYCODONE HYDROCHLORIDE 30 MG/1
30 TABLET ORAL
Qty: 84 TABLET | Refills: 0 | Status: SHIPPED | OUTPATIENT
Start: 2023-05-19 | End: 2023-05-30

## 2023-05-19 NOTE — TELEPHONE ENCOUNTER
Called this week for refill of oxycodone.  Staff informed that I have not received any information from [facility] regarding her discharge.  I called patient today given Friday afternoon.  States that she had called them on Wednesday and again today.  I do not see any information Care Everywhere.  Reports she has been home since 5/8 still did use the prescription that had been dispensed at her last appointment.  States she was also given a prescription to take some 15 mg immediate release through the day and continuing the oxycodone 30 mg every 4 hours.    I will send in a prescription today.  Have staff make follow-up.  We will continue to look for information from [facility]

## 2023-05-30 ENCOUNTER — TELEPHONE (OUTPATIENT)
Dept: PALLIATIVE MEDICINE | Facility: OTHER | Age: 63
End: 2023-05-30
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
## 2023-06-19 ENCOUNTER — VIRTUAL VISIT (OUTPATIENT)
Dept: PALLIATIVE MEDICINE | Facility: OTHER | Age: 63
End: 2023-06-19
Payer: COMMERCIAL

## 2023-06-19 DIAGNOSIS — G62.0 DRUG-INDUCED POLYNEUROPATHY (H): ICD-10-CM

## 2023-06-19 DIAGNOSIS — G89.4 CHRONIC PAIN DISORDER: ICD-10-CM

## 2023-06-19 PROCEDURE — G0463 HOSPITAL OUTPT CLINIC VISIT: HCPCS | Mod: GT

## 2023-06-19 PROCEDURE — 99214 OFFICE O/P EST MOD 30 MIN: CPT | Mod: VID | Performed by: ANESTHESIOLOGY

## 2023-06-19 RX ORDER — KETAMINE HCL 100 %
POWDER (GRAM) MISCELLANEOUS
Qty: 90 G | Refills: 3 | Status: SHIPPED | OUTPATIENT
Start: 2023-06-19 | End: 2023-10-30

## 2023-06-19 RX ORDER — KETAMINE HCL 100 %
POWDER (GRAM) MISCELLANEOUS
Qty: 120 G | Refills: 3 | Status: SHIPPED | OUTPATIENT
Start: 2023-06-19 | End: 2023-08-18

## 2023-06-19 ASSESSMENT — PAIN SCALES - GENERAL: PAINLEVEL: WORST PAIN (10)

## 2023-06-19 NOTE — PATIENT INSTRUCTIONS
[facility] Number:  [phone]  Call with any questions about your care and for scheduling assistance.   Calls are returned Monday through Friday between 8 AM and 4:30 PM. We usually get back to you within 2 business days depending on the issue/request.    If we are prescribing your medications:  For opioid medication refills, call the clinic or send a [employer] message 7 days in advance.  Please include:  Name of requested medication  Name of the pharmacy.  For non-opioid medications, call your pharmacy directly to request a refill. Please allow 3-4 days to be processed.   Per [place] State Law:  All controlled substance prescriptions must be filled within 30 days of being written.    For those controlled substances allowing refills, pickup must occur within 30 days of last fill.      We believe regular attendance is key to your success in our program!    Any time you are unable to keep your appointment we ask that you call us at least 24 hours in advance to cancel.This will allow us to offer the appointment time to another patient.   Multiple missed appointments may lead to dismissal from the clinic.     PLAN:    Resume ketamine 1 and 20 mg lozenges, 1/2 lozenge 4 times a day.    Resume ketamine topical cream.    Continue the oxycodone 30 mg immediate release every 4 hours.    Continue your bowel regimen    Reviewed that you will discuss with the neurosurgical team postoperative pain management through the global period.    Follow-up with Dr. Berry in the office in 6 weeks.
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
## 2023-06-29 ENCOUNTER — TELEPHONE (OUTPATIENT)
Dept: PALLIATIVE MEDICINE | Facility: OTHER | Age: 63
End: 2023-06-29

## 2023-06-29 NOTE — TELEPHONE ENCOUNTER
Returned call to pharmacy. Spoke to Xin, pharmacist, and she let us know that the patient was called the pharmacy last night upset that she could not get her medication and that she was out yesterday. Pharmacist wanted to relay the message to Dr. Berry.

## 2023-06-29 NOTE — TELEPHONE ENCOUNTER
[facility]    Phone Message    May a detailed message be left on voicemail: yes     Reason for Call: Medication Question or concern regarding medication   Prescription Clarification  Name of Medication: oxyCODONE IR (ROXICODONE) 30 MG   Prescribing Provider: Dr. Berry   Pharmacy: [id number] - [place], [place] - [address] [address] [place]   What on the order needs clarification?     Xin is calling from [facility] pharmacy to let us know that the patient had called them last night very upset that she was unable to  her medication yesterday. The patient stated that she was out of the medication yesterday to the pharmacy. The pharmacy has concerns that this patient is taking this medication more than she should be. Please call pharmacy back to discuss further.           Action Taken: Message routed to:  Other: [facility]    Travel Screening: Not Applicable

## 2023-07-06 ENCOUNTER — TELEPHONE (OUTPATIENT)
Dept: PALLIATIVE MEDICINE | Facility: OTHER | Age: 63
End: 2023-07-06

## 2023-07-06 DIAGNOSIS — G89.4 CHRONIC PAIN SYNDROME: ICD-10-CM

## 2023-07-06 RX ORDER — OXYCODONE HYDROCHLORIDE 30 MG/1
30 TABLET ORAL
Qty: 84 TABLET | Refills: 0 | Status: SHIPPED | OUTPATIENT
Start: 2023-07-06 | End: 2023-07-21

## 2023-07-06 NOTE — TELEPHONE ENCOUNTER
[facility] Call [facility]    Phone Message    May a detailed message be left on voicemail: yes     Reason for Call: Other: Pharmacy is calling because they received another prescription for oxycodone 5MG tablets-15 tablets to take 1 every 8hours as needed- from a different provider-Rahel Kirby [phone]. Please call back pharmacy to confirm if this is okay to fill.      Action Taken: Message routed to:  Other: [facility]    Travel Screening: Not Applicable

## 2023-07-06 NOTE — TELEPHONE ENCOUNTER
[facility]    Phone Message    May a detailed message be left on voicemail: yes     Reason for Call: Other: Patient called requesting to speak with a nurse regarding breakthrough pain from fracturing her neck. Please call patient to discuss. Patient states she needs the refill today.    Action Taken: Message routed to:  Other: [facility]    Travel Screening: Not Applicable
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
## 2023-07-12 ENCOUNTER — TELEPHONE (OUTPATIENT)
Dept: FAMILY MEDICINE | Facility: CLINIC | Age: 63
End: 2023-07-12

## 2023-07-12 ENCOUNTER — TELEPHONE (OUTPATIENT)
Dept: PALLIATIVE MEDICINE | Facility: OTHER | Age: 63
End: 2023-07-12

## 2023-07-12 DIAGNOSIS — G89.4 CHRONIC PAIN DISORDER: ICD-10-CM

## 2023-07-12 NOTE — TELEPHONE ENCOUNTER
Call returned to pharmacy. Did not give authorization to fill early without Dr. Berry's approval. Will route to him.

## 2023-07-12 NOTE — TELEPHONE ENCOUNTER
[facility] Call [facility]    Phone Message    May a detailed message be left on voicemail: yes     Reason for Call: Other: Pharmacy is calling and Pt is requesting  to fill her cyclobenzaprine (FLEXERIL) 10 MG tablet early it is do to be filled on 7/23 and Pt would like it fill today 7/12/2023 Please call Pharmacy back and let them know if ok.     Action Taken: Message routed to:  Other: [facility]    Travel Screening: Not Applicable
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
## 2023-07-13 NOTE — TELEPHONE ENCOUNTER
Ok for early refill.  Will need to discuss neurology referral at follow-up visit.    Pablo Zarco MD

## 2023-07-13 NOTE — TELEPHONE ENCOUNTER
Pharmacist notified of ok for early refill for Imitrex.    Left message on answering machine for patient to call back to the nurse at [phone].    Elvira Enamorado RN  [facility]
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
## 2023-07-17 RX ORDER — CYCLOBENZAPRINE HCL 10 MG
10 TABLET ORAL 4 TIMES DAILY
Qty: 360 TABLET | Refills: 1 | Status: CANCELLED | OUTPATIENT
Start: 2023-07-17

## 2023-07-18 NOTE — TELEPHONE ENCOUNTER
"Call placed to pharmacy. Gave them authorization to fill cyclobenzaprine (FLEXERIL) 10 MG tablet early per DR. Berry's message on 7/17/23:  \"Okay then to fill early \"  "

## 2023-07-18 NOTE — TELEPHONE ENCOUNTER
[facility] Call [facility]    Phone Message    May a detailed message be left on voicemail: yes     Reason for Call: Other: Pharmacy is calling regarding the flexeril prescription. They have 2 different prescriptions. Please call to clarify.      Action Taken: Other: Pain    Travel Screening: Not Applicable

## 2023-07-19 NOTE — TELEPHONE ENCOUNTER
Call returned to pharmacy. They will discontinue the old prescription of the flexeril that is from 1/2021 and keep the current one that was ordered on 6/8/23 with the directions to  take 1 tablet 4x per day PRN.

## 2023-07-21 ENCOUNTER — TELEPHONE (OUTPATIENT)
Dept: PALLIATIVE MEDICINE | Facility: OTHER | Age: 63
End: 2023-07-21

## 2023-07-21 DIAGNOSIS — G89.4 CHRONIC PAIN SYNDROME: ICD-10-CM

## 2023-07-21 NOTE — TELEPHONE ENCOUNTER
[facility] Call [facility]    Phone Message    May a detailed message be left on voicemail: yes     Reason for Call: Medication Refill Request    Has the patient contacted the pharmacy for the refill? Yes   Name of medication being requested: oxyCODONE IR (ROXICODONE) 30 MG tablet  Provider who prescribed the medication: Jamal  Pharmacy:    [id number] - [place], [place] - [address] [address] [place]      Date medication is needed: by 7/26/23        Action Taken: Other: Pain    Travel Screening: Not Applicable

## 2023-07-21 NOTE — TELEPHONE ENCOUNTER
Last signed CSA on file was 10/23/19.    Pending Prescriptions:                       Disp   Refills    oxyCODONE IR (ROXICODONE) 30 MG tablet    84 tab*0            Sig: Take 1 tablet (30 mg) by mouth 6 times daily Fill           7/27 for 7/28    [facility]

## 2023-07-21 NOTE — TELEPHONE ENCOUNTER
Refill requested for:  oxyCODONE IR (ROXICODONE) 30 MG tablet    [facility]

## 2023-07-21 NOTE — TELEPHONE ENCOUNTER
Refills have been requested for the following medications:         oxyCODONE IR (ROXICODONE) 30 MG tablet [ADRI DIXON]       Patient Comment: Per previous discussion. Pick-up Friday. No start date otherwise unable to  friday.     Preferred pharmacy: [id number] - [place], [place] - [address] [address]. [place]      Patient Assessment:    Patient  Intervention: Dann Richards;Ph.D, [facility]  Staff   [facility]  [phone]

## 2023-07-21 NOTE — TELEPHONE ENCOUNTER
Received call from patient requesting refill(s) of oxyCODONE IR (ROXICODONE) 30 MG tablet    Last dispensed from pharmacy on 07/13/23    Patient's last office/virtual visit by prescribing provider on 06/19/23  Next office/virtual appointment scheduled for 08/18/23    Last urine drug screen date 04/18/23  Current opioid agreement on file (completed within the last year) Yes Date of opioid agreement: 02/28/23    E-prescribe to pharmacy-[id number] - [place], [place] - [address] [address] [place]    Will route to nursing pool for review and preparation of prescription(s).

## 2023-07-24 RX ORDER — OXYCODONE HYDROCHLORIDE 30 MG/1
30 TABLET ORAL
Qty: 84 TABLET | Refills: 0 | Status: SHIPPED | OUTPATIENT
Start: 2023-07-24 | End: 2023-07-25

## 2023-07-24 NOTE — TELEPHONE ENCOUNTER
[facility] Call Center    Phone Message    May a detailed message be left on voicemail: yes     Reason for Call: Other: Patient states the plan with Dr. Berry was for her to fill 7/26/2023 to start 7/27/2023. Patient states this is not the first time things have gotten messed up with her medication. Please review.  Patient can be reached at [phone].    Action Taken: Message routed to:  Other: [facility]    Travel Screening: Not Applicable

## 2023-07-25 RX ORDER — OXYCODONE HYDROCHLORIDE 30 MG/1
30 TABLET ORAL
Qty: 84 TABLET | Refills: 0 | Status: SHIPPED | OUTPATIENT
Start: 2023-07-25 | End: 2023-08-07

## 2023-07-25 NOTE — CONFIDENTIAL NOTE
Pending Prescriptions:                       Disp   Refills    oxyCODONE IR (ROXICODONE) 30 MG tablet    84 tab*0            Sig: Take 1 tablet (30 mg) by mouth 6 times daily Fill           7/26/23 for start 7/27/23. Please cancel rx sent           with fill date of 7/27/23    Signed Prescriptions:                        Disp   Refills    oxyCODONE IR (ROXICODONE) 30 MG tablet     84 tab*0        Sig: Take 1 tablet (30 mg) by mouth 6 times daily Fill           7/27 for 7/28  Authorizing Provider: ADRI DIXON

## 2023-08-04 ENCOUNTER — TELEPHONE (OUTPATIENT)
Dept: PALLIATIVE MEDICINE | Facility: OTHER | Age: 63
End: 2023-08-04

## 2023-08-04 DIAGNOSIS — G89.4 CHRONIC PAIN SYNDROME: ICD-10-CM

## 2023-08-04 NOTE — TELEPHONE ENCOUNTER
[facility]    Phone Message    May a detailed message be left on voicemail: yes     Reason for Call: Medication Refill Request    Has the patient contacted the pharmacy for the refill? Yes   Name of medication being requested: oxyCODONE IR (ROXICODONE) 30 MG tablet  Provider who prescribed the medication:   Pharmacy: [id number] - [place], [place] - [address] [address] [place]  Date medication is needed: 8/9/23        Action Taken: Message routed to:  Other: [facility]    Travel Screening: Not Applicable

## 2023-08-07 RX ORDER — OXYCODONE HYDROCHLORIDE 30 MG/1
30 TABLET ORAL
Qty: 84 TABLET | Refills: 0 | Status: SHIPPED | OUTPATIENT
Start: 2023-08-07 | End: 2023-08-18

## 2023-08-07 NOTE — TELEPHONE ENCOUNTER
Received refill request for:    oxyCODONE IR (ROXICODONE) 30 MG tablet      Last dispensed from pharmacy on 7/26/23    Patient's last office/virtual visit by prescribing provider on 6/19/23  Next office/virtual appointment scheduled for 8/18/23    Last urine drug screen date 6/19/23  Current opioid agreement on file? NO Date of opioid agreement: 10/23/2019    E-prescribe to:     [facility] #[id number] - [place], [place] - [address] [address] [place]      Will route to nursing pool for review and preparation of prescription(s).

## 2023-08-07 NOTE — CONFIDENTIAL NOTE
CSA: 2/28/23    Pending Prescriptions:                       Disp   Refills    oxyCODONE IR (ROXICODONE) 30 MG tablet    84 tab*0            Sig: Take 1 tablet (30 mg) by mouth 6 times daily Fill           8/9/23 for start 8/10/23    [facility]

## 2023-08-16 ENCOUNTER — TELEPHONE (OUTPATIENT)
Dept: PALLIATIVE MEDICINE | Facility: OTHER | Age: 63
End: 2023-08-16
Payer: COMMERCIAL

## 2023-08-16 DIAGNOSIS — G89.4 CHRONIC PAIN SYNDROME: ICD-10-CM

## 2023-08-16 NOTE — TELEPHONE ENCOUNTER
[facility]    Phone Message    May a detailed message be left on voicemail: yes     Reason for Call: Medication Refill Request    Has the patient contacted the pharmacy for the refill? Yes   Name of medication being requested: oxyCODONE IR (ROXICODONE) 30 MG tablet   Provider who prescribed the medication: Jamal  Pharmacy:    [id number] - [place], [place] - [address] [address] [place]      Date medication is needed: by 8/23/23       Action Taken: Other: Pain    Travel Screening: Not Applicable

## 2023-08-18 ENCOUNTER — OFFICE VISIT (OUTPATIENT)
Dept: PALLIATIVE MEDICINE | Facility: OTHER | Age: 63
End: 2023-08-18
Attending: ANESTHESIOLOGY
Payer: COMMERCIAL

## 2023-08-18 VITALS
DIASTOLIC BLOOD PRESSURE: 87 MMHG | WEIGHT: 182 LBS | BODY MASS INDEX: 30.29 KG/M2 | HEART RATE: 126 BPM | SYSTOLIC BLOOD PRESSURE: 157 MMHG | OXYGEN SATURATION: 95 %

## 2023-08-18 DIAGNOSIS — G89.4 CHRONIC PAIN SYNDROME: ICD-10-CM

## 2023-08-18 DIAGNOSIS — S12.691G OTHER CLOSED NONDISPLACED FRACTURE OF SEVENTH CERVICAL VERTEBRA WITH DELAYED HEALING, SUBSEQUENT ENCOUNTER: Primary | ICD-10-CM

## 2023-08-18 DIAGNOSIS — G89.4 CHRONIC PAIN DISORDER: ICD-10-CM

## 2023-08-18 PROCEDURE — 99214 OFFICE O/P EST MOD 30 MIN: CPT | Performed by: ANESTHESIOLOGY

## 2023-08-18 PROCEDURE — G0463 HOSPITAL OUTPT CLINIC VISIT: HCPCS | Performed by: ANESTHESIOLOGY

## 2023-08-18 RX ORDER — KETAMINE HCL 100 %
POWDER (GRAM) MISCELLANEOUS
Qty: 120 G | Refills: 3 | Status: SHIPPED | OUTPATIENT
Start: 2023-08-18 | End: 2023-10-30

## 2023-08-18 RX ORDER — OXYCODONE HYDROCHLORIDE 30 MG/1
30 TABLET ORAL
Qty: 84 TABLET | Refills: 0 | Status: SHIPPED | OUTPATIENT
Start: 2023-08-18 | End: 2023-09-01

## 2023-08-18 ASSESSMENT — PAIN SCALES - GENERAL: PAINLEVEL: SEVERE PAIN (7)

## 2023-08-18 NOTE — PROGRESS NOTES
[facility] - Office Visit    ASSESSMENT & PLAN     Tisha was seen today for pain.    Diagnoses and all orders for this visit:    Other closed nondisplaced fracture of seventh cervical vertebra with delayed healing, subsequent encounter  -     Comprehensive metabolic panel; Future  -     Vitamin D deficiency screening; Future        Patient Instructions     [facility] Number:  [phone]  Call with any questions about your care and for scheduling assistance.   Calls are returned Monday through Friday between 8 AM and 4:30 PM. We usually get back to you within 2 business days depending on the issue/request.    If we are prescribing your medications:  For opioid medication refills, call the clinic or send a [employer] message 7 days in advance.  Please include:  Name of requested medication  Name of the pharmacy.  For non-opioid medications, call your pharmacy directly to request a refill. Please allow 3-4 days to be processed.   Per [place] State Law:  All controlled substance prescriptions must be filled within 30 days of being written.    For those controlled substances allowing refills, pickup must occur within 30 days of last fill.      We believe regular attendance is key to your success in our program!    Any time you are unable to keep your appointment we ask that you call us at least 24 hours in advance to cancel.This will allow us to offer the appointment time to another patient.   Multiple missed appointments may lead to dismissal from the clinic.     PLAN:    Vitamin D level and calcium level to be obtained at the [facility].    Discussed the use of a grounding mat to facilitate healing.    Continue with the ketamine 120 mg lozenges, 1/2 lozenge 4 times a day.    Continue with ketamine topical cream.    Continue the oxy codon 30 mg every 4 hours.    Follow-up with Dr. Dixon in 3 months      -----  ADRI DIXON MD  [facility]  [place] [facility]       SUBJECTIVE      Tisha Arias is a 62 year old year old female who presents to clinic today for the following:      Review of Systems   General, psych, musculoskeletal, bowels and bladder otherwise normal other than above.          OBJECTIVE   BP (!) 171/102   Pulse 106   Ht 1.829 m (6')   Wt 103.8 kg (228 lb 14.4 oz)   SpO2 97%   BMI 31.04 kg/m        Physical Exam  General:  Normal appearance, no apparent distress  Cardiovascular: Normal rate  Lungs: Pulmonary effort is normal, speaking in full sentences  MSK: normal muscle bulk and tone, ROM, equal strength in all extremities  Skin: Warm and dry. No concerning rashes or lesions.  Neurologic: No focal deficit, alert and oriented x3  Psychiatric: normal mood and affect, cooperative      Total time spent:  minutes  [facility] - Office Visit    ASSESSMENT & PLAN     Tisha was seen today for pain.    Diagnoses and all orders for this visit:    Other closed nondisplaced fracture of seventh cervical vertebra with delayed healing, subsequent encounter  -     Comprehensive metabolic panel; Future  -     Vitamin D deficiency screening; Future        Patient Instructions     [facility] Number:  [phone]  Call with any questions about your care and for scheduling assistance.   Calls are returned Monday through Friday between 8 AM and 4:30 PM. We usually get back to you within 2 business days depending on the issue/request.    If we are prescribing your medications:  For opioid medication refills, call the clinic or send a [employer] message 7 days in advance.  Please include:  Name of requested medication  Name of the pharmacy.  For non-opioid medications, call your pharmacy directly to request a refill. Please allow 3-4 days to be processed.   Per [place] State Law:  All controlled substance prescriptions must be filled within 30 days of being written.    For  "those controlled substances allowing refills, pickup must occur within 30 days of last fill.      We believe regular attendance is key to your success in our program!    Any time you are unable to keep your appointment we ask that you call us at least 24 hours in advance to cancel.This will allow us to offer the appointment time to another patient.   Multiple missed appointments may lead to dismissal from the clinic.     PLAN:    Vitamin D level and calcium level to be obtained at the [facility].    Discussed the use of a grounding mat to facilitate healing.    Continue with the ketamine 120 mg lozenges, 1/2 lozenge 4 times a day.    Continue with ketamine topical cream.    Continue the oxy codon 30 mg every 4 hours.    Follow-up with Dr. 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  More recently now dealing with cervical fracture.    Describes today \"hanging in there\".  Did see neurosurgery last week.  She was hoping to have a cervical collar removed but will need to be another month as they reviewed the x-rays and not healing well.  Also the CT next month.  She is taking vitamin D and her calcium.  Had not been continue the vitamin K2 which she has been taking before the hospitalization but resumed again 4 to 6 weeks ago.  Reviewed the record of vitamin D level a year ago was around 40 and her calcium was low.    We discussed the use of the grounding mats to help with bone healing.  Discussed there is some literature that regard.  She would like to look into that.    She continues with the ketamine 1 and 20 mg lozenge 1/2 lozenge 4 times a day which seems to help some.  They are working on different taste.  Has used the ketamine cream, unclear how helpful.  Is using a " different product.    She does continue with the oxycodone 30 mg every 4 hours.  Due for updated urine drug testing today,  reviewed.    She is mostly staying at home to avoid the risk of falling while going out of the house, stairs outside the house.  Usually in the summer gardening and mowing the lawn service been part.  She has a 5 pound weight limit.    She did have the halo removed last month.  That was painful.  Also in place that can precipitate migraines that started in her occipital area.    Review she was on the ventilator for 3-1/2 weeks.  Did not have any PTSD from this.  Aware she did have some ICU psychosis for few days, we discussed that it is a delirium.    Current Outpatient Medications   Medication    ACAI BERRY PO    acetaminophen (TYLENOL) 325 MG tablet    albuterol (VENTOLIN HFA) 108 (90 Base) MCG/ACT inhaler    calcitRIOL (ROCALTROL) 0.25 MCG capsule    calcium citrate-vitamin D (CALCIUM CITRATE + D3) 315-250 MG-UNIT TABS per tablet    celecoxib (CELEBREX) 200 MG capsule    cetirizine (ZYRTEC ALLERGY) 10 MG tablet    Cholecalciferol (VITAMIN D3) 50 MCG (2000 UT) CAPS    cromolyn (OPTICROM) 4 % ophthalmic solution    cromolyn sodium (NASALCROM) 5.2 MG/ACT nasal aerosol    cyclobenzaprine (FLEXERIL) 10 MG tablet    cyclobenzaprine (FLEXERIL) 10 MG tablet    diclofenac (VOLTAREN) 1 % topical gel    famotidine (PEPCID) 20 MG tablet    fluconazole (DIFLUCAN) 150 MG tablet    fluticasone (FLONASE) 50 MCG/ACT nasal spray    hydrOXYzine (ATARAX) 25 MG tablet    ketamine HCl POWD    ketorolac (TORADOL) 30 MG/ML injection    KLOR-CON 20 MEQ CR tablet    levothyroxine (SYNTHROID/LEVOTHROID) 112 MCG tablet    lidocaine (XYLOCAINE) 5 % external ointment    lidocaine-prilocaine (EMLA) cream    lisinopril-hydrochlorothiazide (ZESTORETIC) 10-12.5 MG tablet    magnesium oxide (MAG-OX) 400 MG tablet    Menaquinone-7 (VITAMIN K2) 100 MCG CAPS    montelukast (SINGULAIR) 10 MG tablet    naloxegol (MOVANTIK) 25  MG TABS tablet    naloxone (NARCAN) nasal spray    olopatadine (PATANOL) 0.1 % ophthalmic solution    omeprazole (PRILOSEC) 10 MG DR capsule    ondansetron (ZOFRAN ODT) 8 MG ODT tab    Probiotic Product (PROBIOTIC DAILY PO)    ranitidine (ZANTAC) 75 MG tablet    SUMAtriptan (IMITREX) 50 MG tablet    tobramycin (TOBREX) 0.3 % ophthalmic solution    tobramycin-dexamethasone (TOBRADEX) 0.3-0.1 % ophthalmic suspension    triamcinolone (KENALOG) 0.025 % ointment    Triamcinolone Acetonide (AZMACORT IN)    UNABLE TO FIND    UNABLE TO FIND    UNABLE TO FIND    ketamine HCl POWD    oxyCODONE IR (ROXICODONE) 30 MG tablet     No current facility-administered medications for this visit.             Review of Systems   General, psych, musculoskeletal, bowels and bladder otherwise normal other than above.          OBJECTIVE   BP (!) 157/87   Pulse (!) 126   Wt 82.6 kg (182 lb)   LMP  (LMP Unknown)   SpO2 95%   BMI 30.29 kg/m        Physical Exam  General: Alert, clear sensorium cervical collar.  No respiratory distress, no pain behavior.  Cardiovascular  Lungs: Pulmonary effort is normal, speaking in full sentences  MSK:   Skin:  No concerning rashes or lesions.  Neurologic: No focal deficit, alert and oriented x3  Psychiatric: Affect congruent.    Assessment, recovering from cervical fracture followed by neurosurgery.  Reports slow bone healing.  We discussed checking vitamin D and calcium to optimize vitamin D replacement and continue with the vitamin K2.    Discussed use of grounding mat, with the study for wound healing.    Continue the ketamine oxycodone as above.    Total time 28 minutes      Total time spent:  minutes  [facility] - Office Visit    ASSESSMENT & PLAN     Tisha was seen today for pain.    Diagnoses and all orders for this visit:    Other closed nondisplaced fracture of seventh cervical vertebra with delayed healing, subsequent encounter  -     Comprehensive metabolic panel;  Future  -     Vitamin D deficiency screening; Future        Patient Instructions     [facility]    Clinic Number:  [phone]  Call with any questions about your care and for scheduling assistance.   Calls are returned Monday through Friday between 8 AM and 4:30 PM. We usually get back to you within 2 business days depending on the issue/request.    If we are prescribing your medications:  For opioid medication refills, call the clinic or send a [employer] message 7 days in advance.  Please include:  Name of requested medication  Name of the pharmacy.  For non-opioid medications, call your pharmacy directly to request a refill. Please allow 3-4 days to be processed.   Per [place] State Law:  All controlled substance prescriptions must be filled within 30 days of being written.    For those controlled substances allowing refills, pickup must occur within 30 days of last fill.      We believe regular attendance is key to your success in our program!    Any time you are unable to keep your appointment we ask that you call us at least 24 hours in advance to cancel.This will allow us to offer the appointment time to another patient.   Multiple missed appointments may lead to dismissal from the clinic.       -----  ADRI DIXON MD  [facility]       SUBJECTIVE      Tisha Arias is a 62 year old year old female who presents to clinic today for the following:         Review of Systems   General, psych, musculoskeletal, bowels and bladder otherwise normal other than above.          OBJECTIVE   BP (!) 171/102   Pulse 106   Ht 1.829 m (6')   Wt 103.8 kg (228 lb 14.4 oz)   SpO2 97%   BMI 31.04 kg/m        Physical Exam  General:  Normal appearance, no apparent distress  Cardiovascular: Normal rate  Lungs: Pulmonary effort is normal, speaking in full sentences  MSK: normal muscle bulk and tone, ROM, equal strength in all extremities  Skin: Warm and dry. No  concerning rashes or lesions.  Neurologic: No focal deficit, alert and oriented x3  Psychiatric: normal mood and affect, cooperative      Total time spent:  minutes

## 2023-08-18 NOTE — PATIENT INSTRUCTIONS
[facility] Number:  [phone]  Call with any questions about your care and for scheduling assistance.   Calls are returned Monday through Friday between 8 AM and 4:30 PM. We usually get back to you within 2 business days depending on the issue/request.    If we are prescribing your medications:  For opioid medication refills, call the clinic or send a [email or web address] message 7 days in advance.  Please include:  Name of requested medication  Name of the pharmacy.  For non-opioid medications, call your pharmacy directly to request a refill. Please allow 3-4 days to be processed.   Per [place] State Law:  All controlled substance prescriptions must be filled within 30 days of being written.    For those controlled substances allowing refills, pickup must occur within 30 days of last fill.      We believe regular attendance is key to your success in our program!    Any time you are unable to keep your appointment we ask that you call us at least 24 hours in advance to cancel.This will allow us to offer the appointment time to another patient.   Multiple missed appointments may lead to dismissal from the clinic.     PLAN:    Vitamin D level and calcium level to be obtained at the [facility].    Discussed the use of a grounding mat to facilitate healing.    Continue with the ketamine 120 mg lozenges, 1/2 lozenge 4 times a day.    Continue with ketamine topical cream.    Continue the oxy codon 30 mg every 4 hours.    Follow-up with Dr. Berry in 3 months

## 2023-08-21 NOTE — PROGRESS NOTES
SUBJECTIVE:   Tisha Arias is a 57 year old female who presents to clinic today for the following health issues:      ENT Symptoms             Symptoms: cc Present Absent Comment   Fever/Chills   x    Fatigue  x     Muscle Aches  x     Eye Irritation  x     Sneezing   x    Nasal Issac/Drg  x     Sinus Pressure/Pain  x     Loss of smell  x     Dental pain   x    Sore Throat  x     Swollen Glands  x     Ear Pain/Fullness  x     Cough  x     Wheeze   x    Chest Pain  x  cough   Shortness of breath  x     Rash  x  Red blotches face/chest   Other  x  Headache ; Frontal     Symptom duration:  2 weeks   Symptom severity:  moderate/severe   Treatments tried:  Tylenol, mucinex, steam, saline spray    Contacts:  unknown     Elvin is a 58yo F with recent past medical history of breast cancer s/p mastectomy, thyroid cancer s/p total thyroidectomy, and NHL for which she finished chemo 2.5months ago.  She presents with upper respiratory symptoms.  Her sore throat began on 4/4 and has since had productive cough, chills/hot flashes, frontal sinus pressure with green nasal discharge.  No muscle aches or fatigue, no nausea or vomiting.  Patient is taking tylenol, mucinex, steam, nasal spray.    Problem list and histories reviewed & adjusted, as indicated.  Additional history: as documented    BP Readings from Last 3 Encounters:   04/19/18 102/72   02/28/18 118/61   02/23/18 129/75    Wt Readings from Last 3 Encounters:   02/23/18 196 lb 8 oz (89.1 kg)   02/01/18 191 lb 8 oz (86.9 kg)   01/29/18 178 lb 14.4 oz (81.1 kg)        Reviewed and updated as needed this visit by clinical staff  Tobacco  Allergies  Meds  Problems       Reviewed and updated as needed this visit by Provider  Allergies  Meds  Problems         ROS:  Constitutional, HEENT, cardiovascular, pulmonary, gi and gu systems are negative, except as otherwise noted.    OBJECTIVE:     /72  Pulse 103  Temp 98.7  F (37.1  C) (Oral)  Resp 14  SpO2    Women and Heart Disease: Understanding the Risks  Risk factors are habits and conditions that make heart disease more likely. The more you have, the higher your chances of heart attack (also known as acute myocardial infarction, or AMI) and other problems. You can manage most risk factors to help make your heart healthier. Below are many of the major factors that increase your risk for having heart disease.   Smoking  This is the most important risk factor you can change. Smoking causes inflammation and damages the smooth muscle that lines the arteries. This makes them less flexible. It also raises your blood pressure, causing further damage to the artery lining. Smoking also increased your risk that your blood may clot, block an artery, and lead to a heart attack or stroke. Smoking also damages your lungs, which can affect the delivery of oxygen to the body. Research shows that smoking makes women up to 6 times more likely to have a heart attack. It's also important to avoid secondhand smoke (smoke from other people’s tobacco products). If you smoke, it's never too late to protect your heart. Ask your doctor about nicotine replacement products and other medicines, and smoking cessation counseling.   Diabetes  Diabetes causes high blood sugar, which can damage blood vessels if not kept under control. Having diabetes also makes you more likely to have a silent heart attack--one without any symptoms. This occurs because long periods of high sugar levels in your blood eventually degrade nerve conduction which reduces your sensation. This translates to decreased chest pain than would be felt in a person without diabetes during a heart attack. You’re at risk if your fasting blood sugar level is above 100 mg/dL.   High cholesterol  Cholesterol is a fat-like substance in your blood. It can build up along the artery walls. This is called plaque. Over time, plaque narrows the arteries and reduces blood flow to your heart  96%  There is no height or weight on file to calculate BMI.  GENERAL: healthy, alert and no distress  EYES: Eyes grossly normal to inspection, PERRL and conjunctivae and sclerae normal  HENT: ear canals and TM's normal, nose and mouth without ulcers or lesions, (+)right frontal/maxillary tenderness  NECK: no adenopathy, no asymmetry, masses, or scars and thyroid normal to palpation  RESP: lungs clear to auscultation - no rales, rhonchi or wheezes  CV: regular rate and rhythm, normal S1 S2, no S3 or S4, no murmur, click or rub, no peripheral edema and peripheral pulses strong  ABDOMEN: soft, nontender, no hepatosplenomegaly, no masses and bowel sounds normal  MS: no gross musculoskeletal defects noted, no edema  SKIN: no suspicious lesions or rashes  NEURO: Normal strength and tone, mentation intact and speech normal  PSYCH: mentation appears normal, affect normal/bright    ASSESSMENT/PLAN:     1. Bacterial sinusitis  Will start levaquin given recent chemotherapy treatment and obtain cbc to make sure not neutropenic  - levofloxacin (LEVAQUIN) 750 MG tablet; Take 1 tablet (750 mg) by mouth daily  Dispense: 10 tablet; Refill: 0  - fluticasone (FLONASE) 50 MCG/ACT spray; Spray 1-2 sprays into both nostrils daily  Dispense: 1 Bottle; Refill: 11  - CBC with platelets and differential    2. Status post chemotherapy  - CBC with platelets and differential    Follow up if symptoms worsen or fail to improve.     Pablo Zarco MD  [facility]     or brain. If a blood clot forms or a piece of the plaque breaks off, it can completely block the artery and cause a heart attack or stroke. Your risk of heart disease goes up if you have high levels of LDL (\"bad\") cholesterol or triglycerides (another fatty substance that can build up). You’re also at risk if you have low HDL (\"good\"), cholesterol . HDL helps clear the bad cholesterol away. You’re at risk if you have:   HDL cholesterol 50 mg/dL or lower  LDL cholesterol 100 mg/dL or higher  Triglycerides of  150 mg/dL or higher   .   High blood pressure  High blood pressure occurs when blood pushes too hard against artery walls. This causes damage to the artery walls and then the formation of scar tissue as it heals. This makes the arteries stiff and weak. Plaque sticks to the scarred tissue narrowing and hardening the arteries. High blood pressure also causes your heart to work harder to get blood out to the body. High blood pressure raises your risk of heart attack and stroke. The brain tissue is especially sensitive to high blood pressure damage. You’re at risk if your blood pressure is 120/80 mmHg or higher.   Excess weight  Extra weight makes your heart work harder. This raises your risk for a heart attack. Being overweight also puts you at risk of developing diabetes and high blood pressure. Extra weight around the waist or stomach increases your risk the most even if you are not obese. Being obese puts you at risk of developing heart disease.   Lack of exercise  Without regular exercise, you’re more likely to develop other risk factors, such as overweight and diabetes. High blood pressure and unhealthy lipid levels are also more likely. Exercise helps good blood flow and makes sure your heart is able to meet the demands placed on it.   Negative emotions  Emotions such as stress and pent-up anger have been linked to heart disease. Over time, these emotions could raise your heart disease risk. If you have heart  disease, emotion such as anxiety and depression can make it worse. Managing these emotions is important as they have been shown to reduce hormones that increase stress on the heart in the long run.   Metabolic syndrome  This is caused by a combination of certain risk factors. It puts you at extra high risk for heart disease, stroke, and diabetes. You have metabolic syndrome if you have 3 or more of the following: low HDL cholesterol, high triglycerides, high blood pressure, high blood sugar, and extra weight around the waist.   Risks you can’t control  A few risk factors can’t be changed. But they still raise your heart disease risk.  Family history. If your mother or sister had heart trouble before age 65 or your father or brother before age 55, you’re at higher risk of having a heart attack.  Age. The older you are, the higher your heart disease risk.  Gender. Post-menopause is another risk factor women. So is early onset of menstruation.  For more information  www.smokefree.gov/prhg-fh-fe-expert  www.women.smokefree.gov  National [employer] [place] Smoking Quitline: 877-44U-QUIT (307-727-4342)    Nick last reviewed this educational content on 12/1/2019  © 2229-6077 [employer]. [address] [place] [address], [place] [address]. All rights reserved. This information is not intended as a substitute for professional medical care. Always follow your healthcare professional's instructions.

## 2023-08-24 ENCOUNTER — TELEPHONE (OUTPATIENT)
Dept: PALLIATIVE MEDICINE | Facility: OTHER | Age: 63
End: 2023-08-24

## 2023-08-24 NOTE — TELEPHONE ENCOUNTER
[facility] Center    Phone Message    May a detailed message be left on voicemail: yes     Reason for Call: Medication Question or concern regarding medication   Prescription Clarification  Name of Medication: cyclobenzaprine (FLEXERIL) 10 MG tablet   Prescribing Provider: Jamal   Pharmacy:    [id number] - [place], [place] [address]     What on the order needs clarification? Patient got her last refill on 7/18 and pharmacy noticed it was a 90 day supply. Patient came in today and requested another refill. Please call back to discuss.        Action Taken: Other: Pain    Travel Screening: Not Applicable

## 2023-09-09 DIAGNOSIS — J30.2 SEASONAL ALLERGIC RHINITIS, UNSPECIFIED TRIGGER: ICD-10-CM

## 2023-09-11 DIAGNOSIS — D47.02 MAST CELL DISEASE, SYSTEMIC: Chronic | ICD-10-CM

## 2023-09-11 RX ORDER — OLOPATADINE HYDROCHLORIDE 1 MG/ML
SOLUTION/ DROPS OPHTHALMIC
Qty: 5 ML | Refills: 0 | Status: SHIPPED | OUTPATIENT
Start: 2023-09-11 | End: 2024-08-14

## 2023-09-11 RX ORDER — HYDROXYZINE HYDROCHLORIDE 25 MG/1
TABLET, FILM COATED ORAL
Qty: 270 TABLET | Refills: 3 | Status: SHIPPED | OUTPATIENT
Start: 2023-09-11 | End: 2023-12-27

## 2023-09-11 NOTE — TELEPHONE ENCOUNTER
Received fax from pharmacy requesting refill(s) for     hydrOXYzine (ATARAX) 25 MG tablet    Date last filled 06/08/23    Last Appt Date:08/18/23    Next Appt scheduled: 10/30/23    Pharmacy:   [id number] - [place], [place] - [address] [address]    Pending Prescriptions:                       Disp   Refills    hydrOXYzine (ATARAX) 25 MG tablet         270 ta*3            Sig: One to two tabs every six hours for itching. This           is a 90 day supply

## 2023-09-14 ENCOUNTER — TELEPHONE (OUTPATIENT)
Dept: PALLIATIVE MEDICINE | Facility: OTHER | Age: 63
End: 2023-09-14

## 2023-09-14 NOTE — TELEPHONE ENCOUNTER
[facility] Call Center    Phone Message    May a detailed message be left on voicemail: yes     Reason for Call: Other: Patient is calling requesting to have her 10/30 appointment with Dr. Berry to be done virtually. Please review and call patient back to let her know if this can be done virtually.      Action Taken: Message routed to:  Other: [facility]    Travel Screening: Not Applicable
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
## 2023-10-30 ENCOUNTER — VIRTUAL VISIT (OUTPATIENT)
Dept: PALLIATIVE MEDICINE | Facility: OTHER | Age: 63
End: 2023-10-30
Attending: ANESTHESIOLOGY
Payer: COMMERCIAL

## 2023-10-30 DIAGNOSIS — G62.0 DRUG-INDUCED POLYNEUROPATHY (H): ICD-10-CM

## 2023-10-30 DIAGNOSIS — G89.4 CHRONIC PAIN DISORDER: ICD-10-CM

## 2023-10-30 PROCEDURE — 99213 OFFICE O/P EST LOW 20 MIN: CPT | Mod: VID | Performed by: ANESTHESIOLOGY

## 2023-10-30 RX ORDER — KETAMINE HCL 100 %
POWDER (GRAM) MISCELLANEOUS
Qty: 120 G | Refills: 3 | Status: SHIPPED | OUTPATIENT
Start: 2023-10-30 | End: 2023-12-27

## 2023-10-30 RX ORDER — CLONIDINE HYDROCHLORIDE 0.1 MG/1
TABLET ORAL
Qty: 30 TABLET | Refills: 3 | Status: SHIPPED | OUTPATIENT
Start: 2023-10-30 | End: 2023-12-27

## 2023-10-30 RX ORDER — KETAMINE HCL 100 %
POWDER (GRAM) MISCELLANEOUS
Qty: 90 G | Refills: 3 | Status: SHIPPED | OUTPATIENT
Start: 2023-10-30 | End: 2024-09-23

## 2023-10-30 ASSESSMENT — PAIN SCALES - GENERAL: PAINLEVEL: WORST PAIN (10)

## 2023-10-30 NOTE — PROGRESS NOTES
"[facility] - Office Visit    ASSESSMENT & PLAN     Elvin was seen today for pain.    Diagnoses and all orders for this visit:    Chronic pain disorder  -     ketamine HCl POWD; 120 mg lozenge one-half to one lozenge up to 4 times a day as needed, #120  -     cloNIDine (CATAPRES) 0.1 MG tablet; One every four hours as needed for withdrawal    Drug-induced polyneuropathy (H24)  -     ketamine HCl POWD; Ketamine 10% and lidocaine 10% in topical cream, apply to feet tid , 90 gm tube        Patient Instructions   PLAN:    Clonidine  0.1 mg tablets every 4 hours as needed for withdrawal.    May continue with hydroxyzine 50 mg before hours as needed with nausea for withdrawal.    Ketamine 120 mg lozenges, 1/2 lozenge 4 times a day sent to the [facility] pharmacy to see if it is more tolerable flavor.    Ketamine topical cream sent to the [facility] pharmacy to see if helpful and applied and does not stain your close.    Oxycodone 30 mg every 4 hours, may  for refill on 11/1.  Scheduled to start 11/2.        Discussed the use of a grounding mat to help with pain, wound healing.  May obtain on [employer].  Handout will be mailed to you.    Follow-up with Dr. Dixon in the office in 3 months      -----  ADRI DIXON MD  [facility]       SUBJECTIVE      Tisha Arias is a 63 year old year old female in for video visit follow-up for chemotherapy-induced peripheral neuropathy.  Also recovering from cervical fracture.    Patient called earlier in the day indicating that she had lost her oxycodone, fallen onto the kitchen floor going under the refrigerator becoming wet and damage.    She reviews today \"not great\".  Described she usually takes her 2-week supply of oxycodone, divides into a weekly containers, with while doing so they fell onto the kitchen floor underneath the fridge rater became damp and unusable.  This occurred 4 days ago.  Has had some " withdrawal.  Significant pain particularly in her hips.  Hoping that she could have an early refill.  Reviewed the policy of lost, stolen, damaged, opioids or not refilled.  We do provide withdrawal medicine.  She has been using some hydroxyzine.  Discussed the use of clonidine and she would like to have some.    She had also called to ask if ketamine could be sent to the [facility] pharmacy.  Has tried different tastes with [place] and not finding them tolerable, recalls it was horrible from the [facility] pharmacy though more expensive.  Notes the would even deliver and she would like access to that now.  Would also like the ketamine topical formulation sent there.  The [place] relation was oil-based and stains are closed, the [facility] makes a cream which is easier to apply and find useful.    She did have the halo discontinued this month.  She will be doing some gentle exercises.    She did not get the vitamin D    Did not get the grounding mat but would be interested in that again.    Urine drug test in April MP reviewed    Current Outpatient Medications:     ACAI RAMIREZ PO, Take 50 mg by mouth, Disp: , Rfl:     acetaminophen (TYLENOL) 325 MG tablet, Take 2 tablets (650 mg) by mouth every 4 hours as needed for mild pain or fever, Disp: 100 tablet, Rfl:     albuterol (VENTOLIN HFA) 108 (90 Base) MCG/ACT inhaler, Inhale 1-2 puffs into the lungs every 4 hours as needed for shortness of breath / dyspnea or wheezing, Disp: 18 g, Rfl: 11    calcitRIOL (ROCALTROL) 0.25 MCG capsule, TAKE TWO CAPSULES BY MOUTH IN THE MORNING AND TAKE ONE CAPSULE BY MOUTH IN THE EVENING., Disp: 270 capsule, Rfl: 4    calcium citrate-vitamin D (CALCIUM CITRATE + D3) 315-250 MG-UNIT TABS per tablet, Take 2 tablets by mouth 2 times daily, Disp: 60 tablet, Rfl: 2    celecoxib (CELEBREX) 200 MG capsule, Take 1 capsule (200 mg) by mouth TWO times daily, Disp: 60 capsule, Rfl: 3    cetirizine (ZYRTEC ALLERGY) 10 MG tablet,  Take 1 tablet (10 mg) by mouth 3 times daily On hold for lab test., Disp: 90 tablet, Rfl: 0    Cholecalciferol (VITAMIN D3) 50 MCG (2000 UT) CAPS, Take 2,000 Units by mouth daily, Disp: 90 capsule, Rfl: 1    cloNIDine (CATAPRES) 0.1 MG tablet, One every four hours as needed for withdrawal, Disp: 30 tablet, Rfl: 3    cromolyn (OPTICROM) 4 % ophthalmic solution, Place 1 drop into both eyes 4 times daily, Disp: 10 mL, Rfl: 0    cromolyn sodium (NASALCROM) 5.2 MG/ACT nasal aerosol, SPRAY ONE SPRAY( 1 ML) IN NOSTRIL DAILY, Disp: 1 mL, Rfl: 3    cyclobenzaprine (FLEXERIL) 10 MG tablet, Take 1 tablet (10 mg) by mouth 4 times daily This is a 90 day supply, Disp: 360 tablet, Rfl: 1    cyclobenzaprine (FLEXERIL) 10 MG tablet, Take 1 tablet (10 mg) by mouth 3 times daily as needed for muscle spasms, Disp: 30 tablet, Rfl: 0    diclofenac (VOLTAREN) 1 % topical gel, Apply affected area two times daily as needed using enclosed dosing card. (2-4 grams to chest wall), Disp: 100 g, Rfl: 1    famotidine (PEPCID) 20 MG tablet, Take 1 tablet (20 mg) by mouth 2 times daily This is a 90 day supply, Disp: 180 tablet, Rfl: 1    fluconazole (DIFLUCAN) 150 MG tablet, Take 1 tablet (150 mg) by mouth once as needed (yeast infection symptoms) Repeat in 5 days if still having symptoms., Disp: 2 tablet, Rfl: 0    fluticasone (FLONASE) 50 MCG/ACT nasal spray, Spray 1-2 sprays into both nostrils daily, Disp: 16 g, Rfl: 2    hydrOXYzine (ATARAX) 25 MG tablet, One to two tabs every six hours for itching. This is a 90 day supply, Disp: 270 tablet, Rfl: 3    ketamine HCl POWD, 120 mg lozenge one-half to one lozenge up to 4 times a day as needed, #120, Disp: 120 g, Rfl: 3    ketamine HCl POWD, Ketamine 10% and lidocaine 10% in topical cream, apply to feet tid , 90 gm tube, Disp: 90 g, Rfl: 3    ketorolac (TORADOL) 30 MG/ML injection, Inject 1 mL (30 mg) into the muscle every 6 hours as needed for moderate pain, Disp: 4 mL, Rfl: 3    KLOR-CON 20 MEQ CR  tablet, TAKE ONE TABLET BY MOUTH TWICE DAILY, Disp: 60 tablet, Rfl: 0    levothyroxine (SYNTHROID/LEVOTHROID) 112 MCG tablet, Take 2 tablets (224 mcg) by mouth daily, Disp: 180 tablet, Rfl: 4    lidocaine (XYLOCAINE) 5 % external ointment, Apply quarter size amount to chest and back up to 3 times daily as needed for pain., Disp: 350 g, Rfl: 1    lidocaine-prilocaine (EMLA) cream, Apply topically as needed for moderate pain, Disp: 60 g, Rfl: 1    lisinopril-hydrochlorothiazide (ZESTORETIC) 10-12.5 MG tablet, Take 1 tablet by mouth daily, Disp: 30 tablet, Rfl: 2    magnesium oxide (MAG-OX) 400 MG tablet, Take 1 tablet (400 mg) by mouth 3 times daily, Disp: 90 tablet, Rfl: 4    Menaquinone-7 (VITAMIN K2) 100 MCG CAPS, Take 1 capsule by mouth 2 times daily, Disp: 60 capsule, Rfl: 3    montelukast (SINGULAIR) 10 MG tablet, Take 2 tablets (20 mg) by mouth 2 times daily TAKE TWO TABLETS BY MOUTH TWICE DAILY Strength: 10 mg, Disp: 120 tablet, Rfl: 11    naloxegol (MOVANTIK) 25 MG TABS tablet, Take 1 tablet (25 mg) by mouth every morning (before breakfast), Disp: 30 tablet, Rfl: 3    naloxone (NARCAN) nasal spray, Spray 1 spray (4 mg) into one nostril alternating nostrils as needed for opioid reversal every 2-3 minutes until assistance arrives, Disp: 0.2 mL, Rfl: 0    olopatadine (PATANOL) 0.1 % ophthalmic solution, INSTILL ONE DROP INTO EACH EYE TWICE DAILY, Disp: 5 mL, Rfl: 0    omeprazole (PRILOSEC) 10 MG DR capsule, Take 2 capsules (20 mg) by mouth daily, Disp: 30 capsule, Rfl: 1    ondansetron (ZOFRAN ODT) 8 MG ODT tab, DISSOLVE ONE TABLET IN MOUTH EVERY EIGHT HOURS AS NEEDED FOR NAUSEA Strength: 8 mg, Disp: 30 tablet, Rfl: 1    oxyCODONE IR (ROXICODONE) 30 MG tablet, Take 1 tablet (30 mg) by mouth 6 times daily Fill 11/1/23 for start 11/2/23(14 day RX), Disp: 84 tablet, Rfl: 0    Probiotic Product (PROBIOTIC DAILY PO), Take 1 capsule by mouth daily Lacto acid bifidobacterium, Disp: , Rfl:     ranitidine (ZANTAC) 75 MG  tablet, Take 1 tablet (75 mg) by mouth 3 times daily, Disp: 90 tablet, Rfl: 3    SUMAtriptan (IMITREX) 50 MG tablet, Take 1 tablet by mouth as needed for migraine . May repeat dose in 2 hours as needed. Maximum dose 4 tablets in 24 hours, Disp: 15 tablet, Rfl: 11    tobramycin (TOBREX) 0.3 % ophthalmic solution, ADMINISTER 1 2 DROPS TO THE RIGHT EYE EVERY 4 (FOUR) HOURS FOR 7 DAYS., Disp: , Rfl:     tobramycin-dexamethasone (TOBRADEX) 0.3-0.1 % ophthalmic suspension, INSTILL ONE DROP INTO BOTH EYES THREE TIMES DAILY, Disp: , Rfl:     triamcinolone (KENALOG) 0.025 % ointment, Apply topically as needed, Disp: , Rfl: 3    Triamcinolone Acetonide (AZMACORT IN), Inhale 2 puffs into the lungs as needed, Disp: , Rfl:     UNABLE TO FIND, Take 2 capsules by mouth 3 times daily Muscle Mag. 2 caps contain B1 20mg, B2 20mg, B6 10mg, magesium 20mg, manganese 2mg., Disp: , Rfl:     UNABLE TO FIND, 3 tablets 3 times daily MEDICATION NAME: calcium D-Glucarate  3 caps contain 180mg of elemental calcium., Disp: , Rfl:     UNABLE TO FIND, 1 tablet daily MEDICATION NAME: Pure Encapsulations, Disp: , Rfl:            Review of Systems   General, psych, musculoskeletal, bowels and bladder otherwise normal other than above.          OBJECTIVE   LMP  (LMP Unknown)         Physical Exam  General: Alert, indeed appears unwell and reports and significant pain  Cardiovascular:  Lungs: Pulmonary effort is normal, speaking in full sentences  MSK:   Skin:. No concerning rashes or lesions.  Neurologic: alert and oriented x3  Psychiatric: Affect dysphoric.    Assessment: Chronic pain related to chemotherapy use peripheral neuropathy, and now more recently dealing with cervical fracture.    Presently has been without her opioids for several days have reported increased pain and some withdrawal.    Declined making adjustments such as weekly scripts.    Plan ordered as above.    Total time 22 minutes, video start time 3: 32, video end time 3: 45  patient at home via Doximity

## 2023-10-30 NOTE — PATIENT INSTRUCTIONS
PLAN:    Clonidine  0.1 mg tablets every 4 hours as needed for withdrawal.    May continue with hydroxyzine 50 mg before hours as needed with nausea for withdrawal.    Ketamine 120 mg lozenges, 1/2 lozenge 4 times a day sent to the [facility] pharmacy to see if it is more tolerable flavor.    Ketamine topical cream sent to the [facility] pharmacy to see if helpful and applied and does not stain your close.    Oxycodone 30 mg every 4 hours, may  for refill on 11/1.  Scheduled to start 11/2.        Discussed the use of a grounding mat to help with pain, wound healing.  May obtain on [employer].  Handout will be mailed to you.    Follow-up with Dr. Berry in the office in 3 months

## 2023-10-30 NOTE — PROGRESS NOTES
Patient presents to the clinic today for a follow up with ADRI DIXON MD regarding Pain Management.    Elvin is a 63 year old who is being evaluated via a billable video visit.      How would you like to obtain your AVS? Mail a copy  If the video visit is dropped, the invitation should be resent by: Text to cell phone: [phone]  Will anyone else be joining your video visit? No        Video-Visit Details    Type of service:  Video Visit     Originating Location (pt. Location): Home    Distant Location (provider location):  On-site  Platform used for Video Visit: Doximity        Is Pt currently in [place]? Yes  NOTE: If Pt is not in [place], Appointment needs to be canceled and rescheduled      {      9/20/2022     2:39 PM 8/18/2023     1:57 PM 10/30/2023     1:54 PM   PEG Score   PEG Total Score 4.33 8.33 10          UDS 04.18.2023  Csa 08.18.2023    Medications- oxycodone Last taken Saturday. Patient reports she spilled the pills and they went under the refrigerator and got wet from moisture from the ice dispenser.    Ketamine more than a month    QUESTIONS:ketamine to [email or web address] compound[facility]. Low grade temp, kidneys hurt, back hurts.    Ginette GAMBOA [facility] Visit Facilitator

## 2023-11-09 ENCOUNTER — TELEPHONE (OUTPATIENT)
Dept: PALLIATIVE MEDICINE | Facility: OTHER | Age: 63
End: 2023-11-09
Payer: COMMERCIAL

## 2023-11-09 DIAGNOSIS — G89.4 CHRONIC PAIN SYNDROME: ICD-10-CM

## 2023-11-09 RX ORDER — OXYCODONE HYDROCHLORIDE 30 MG/1
30 TABLET ORAL
Qty: 84 TABLET | Refills: 0 | Status: SHIPPED | OUTPATIENT
Start: 2023-11-09 | End: 2023-11-22

## 2023-11-09 NOTE — TELEPHONE ENCOUNTER
[facility] Call Center    Phone Message    May a detailed message be left on voicemail: yes     Reason for Call: Medication Refill Request    Has the patient contacted the pharmacy for the refill? Yes   Name of medication being requested: oxyCODONE IR (ROXICODONE) 30 MG tablet  Provider who prescribed the medication: Dr. Berry  Pharmacy: [id number] - [place], [place] - [address] [place]    Date medication is needed: 11/14/23      Action Taken: Message routed to:  Other: [facility]    Travel Screening: Not Applicable

## 2023-11-09 NOTE — TELEPHONE ENCOUNTER
Follow-up with Dr. Berry in the office in 3 months   Pending Prescriptions:                       Disp   Refills    oxyCODONE IR (ROXICODONE) 30 MG tablet    84 tab*0            Sig: Take 1 tablet (30 mg) by mouth 6 times daily Fill           11/15/23 for start 11/16/23(14 day RX)    [id number] - [place], [place] - [address] [place]

## 2023-11-09 NOTE — TELEPHONE ENCOUNTER
Received call from patient requesting refill(s) of oxyCODONE IR (ROXICODONE) 30 MG tablet      Last dispensed from pharmacy on 11/01/23    Patient's last office/virtual visit by prescribing provider on 10/30/23  Next office/virtual appointment scheduled for : none    Last urine drug screen date 04/18/23  Current opioid agreement on file (completed within the last year) Yes Date of opioid agreement: 08/18/23    E-prescribe to pharmacy-[id number] - [place], [place] - [address] [place]     Will route to nursing pool for review and preparation of prescription(s).

## 2023-11-09 NOTE — TELEPHONE ENCOUNTER
Patient is Driving and was not able to schedule and she will call tomorrow morning to schedule appt

## 2023-11-10 ENCOUNTER — TELEPHONE (OUTPATIENT)
Dept: PALLIATIVE MEDICINE | Facility: OTHER | Age: 63
End: 2023-11-10
Payer: COMMERCIAL

## 2023-11-10 DIAGNOSIS — R20.8 OPIOID-INDUCED HYPERALGESIA: Primary | ICD-10-CM

## 2023-11-10 DIAGNOSIS — T40.2X5A OPIOID-INDUCED HYPERALGESIA: Primary | ICD-10-CM

## 2023-11-10 DIAGNOSIS — G62.0 DRUG-INDUCED POLYNEUROPATHY (H): ICD-10-CM

## 2023-11-10 NOTE — TELEPHONE ENCOUNTER
Dr. Berry,  patient is requesting to get back on celecoxib (CELEBREX) 200 MG capsule again she has been out for a few months.

## 2023-11-21 NOTE — TELEPHONE ENCOUNTER
[facility] Call Center    Phone Message    May a detailed message be left on voicemail: yes     Reason for Call: Other: Patient calling back again to see if she can get her Celebrex refilled.  Patient requesting a call back.     Action Taken: Message routed to:  Other: [facility]    Travel Screening: Not Applicable

## 2023-11-22 RX ORDER — CELECOXIB 200 MG/1
200 CAPSULE ORAL 2 TIMES DAILY
Qty: 60 CAPSULE | Refills: 1 | Status: SHIPPED | OUTPATIENT
Start: 2023-11-22 | End: 2024-02-12

## 2023-11-22 NOTE — CONFIDENTIAL NOTE
Chart review:  Next apt scheduled 12/11/23    Patient can discuss any new medications/changes in the apt

## 2023-12-08 DIAGNOSIS — G89.4 CHRONIC PAIN SYNDROME: ICD-10-CM

## 2023-12-08 RX ORDER — OXYCODONE HYDROCHLORIDE 30 MG/1
30 TABLET ORAL
Qty: 84 TABLET | Refills: 0 | Status: SHIPPED | OUTPATIENT
Start: 2023-12-08 | End: 2023-12-27

## 2023-12-08 NOTE — TELEPHONE ENCOUNTER
[facility] Call Center    Phone Message    May a detailed message be left on voicemail: yes     Reason for Call: Medication Refill Request    Has the patient contacted the pharmacy for the refill? Yes   Name of medication being requested: oxyCODONE IR (ROXICODONE) 30 MG tablet   Provider who prescribed the medication: Thanh Berry  Pharmacy: [facility] in [place]  Date medication is needed: ASAP

## 2023-12-08 NOTE — TELEPHONE ENCOUNTER
Pending Prescriptions:                       Disp   Refills    oxyCODONE IR (ROXICODONE) 30 MG tablet    84 tab*0            Sig: Take 1 tablet (30 mg) by mouth 6 times daily Fill           12/13/23 for start 12/14/23(14 day RX)    [id number] - [place], [place] - [address] [address] [place]

## 2023-12-08 NOTE — TELEPHONE ENCOUNTER
Received refill request for:    oxyCODONE IR (ROXICODONE) 30 MG tablet       Last dispensed from pharmacy on 11/29/2023.    Patient's last office/virtual visit by prescribing provider on 10/30/2023.  Next office/virtual appointment scheduled for 12/11/2023    Last urine drug screen date 4/18/2023.  Current opioid agreement on file? Yes Date of opioid agreement: 8/18/2023.    E-prescribe to:     [id number] - [place], [place] - [address] [place]    Will route to nursing pool for review and preparation of prescription(s).

## 2023-12-13 NOTE — PROGRESS NOTES
Patient has been scheduled on 1211, canceled and rescheduled today.  Canceled today.  Does not have follow-up scheduled

## 2023-12-27 ENCOUNTER — OFFICE VISIT (OUTPATIENT)
Dept: PALLIATIVE MEDICINE | Facility: OTHER | Age: 63
End: 2023-12-27
Attending: ANESTHESIOLOGY
Payer: COMMERCIAL

## 2023-12-27 VITALS
SYSTOLIC BLOOD PRESSURE: 156 MMHG | HEART RATE: 125 BPM | OXYGEN SATURATION: 100 % | DIASTOLIC BLOOD PRESSURE: 89 MMHG | BODY MASS INDEX: 29.52 KG/M2 | WEIGHT: 177.4 LBS

## 2023-12-27 DIAGNOSIS — G89.4 CHRONIC PAIN SYNDROME: ICD-10-CM

## 2023-12-27 DIAGNOSIS — D47.02 MAST CELL DISEASE, SYSTEMIC: Chronic | ICD-10-CM

## 2023-12-27 DIAGNOSIS — G89.4 CHRONIC PAIN DISORDER: ICD-10-CM

## 2023-12-27 PROCEDURE — 99214 OFFICE O/P EST MOD 30 MIN: CPT | Performed by: ANESTHESIOLOGY

## 2023-12-27 PROCEDURE — G0463 HOSPITAL OUTPT CLINIC VISIT: HCPCS | Performed by: ANESTHESIOLOGY

## 2023-12-27 RX ORDER — KETAMINE HCL 100 %
POWDER (GRAM) MISCELLANEOUS
Qty: 120 G | Refills: 3 | Status: SHIPPED | OUTPATIENT
Start: 2023-12-27 | End: 2024-06-03

## 2023-12-27 RX ORDER — HYDROXYZINE HYDROCHLORIDE 25 MG/1
TABLET, FILM COATED ORAL
Qty: 270 TABLET | Refills: 3 | Status: SHIPPED | OUTPATIENT
Start: 2023-12-27 | End: 2024-05-08

## 2023-12-27 RX ORDER — OXYCODONE HYDROCHLORIDE 30 MG/1
30 TABLET ORAL
Qty: 84 TABLET | Refills: 0 | Status: SHIPPED | OUTPATIENT
Start: 2023-12-27 | End: 2024-01-05

## 2023-12-27 ASSESSMENT — PAIN SCALES - GENERAL: PAINLEVEL: WORST PAIN (10)

## 2023-12-27 NOTE — PATIENT INSTRUCTIONS
[facility] Number:  [phone]  Call with any questions about your care and for scheduling assistance.   Calls are returned Monday through Friday between 8 AM and 4:30 PM. We usually get back to you within 2 business days depending on the issue/request.    If we are prescribing your medications:  For opioid medication refills, call the clinic or send a [employer] message 7 days in advance.  Please include:  Name of requested medication  Name of the pharmacy.  For non-opioid medications, call your pharmacy directly to request a refill. Please allow 3-4 days to be processed.   Per [place] State Law:  All controlled substance prescriptions must be filled within 30 days of being written.    For those controlled substances allowing refills, pickup must occur within 30 days of last fill.      We believe regular attendance is key to your success in our program!    Any time you are unable to keep your appointment we ask that you call us at least 24 hours in advance to cancel.This will allow us to offer the appointment time to another patient.   Multiple missed appointments may lead to dismissal from the clinic.     PLAN:    Scheduled to meet with your oncologist to review lower extremity swelling.      Meet with a dentist to review procedures.    Continue oxycodone 30 mg every 4 hours.    Use hydroxyzine 25 mg 3-4 times a day to augment the oxycodone, reviewed may have allergies with immediate release at exacerbates pain.    Renew the ketamine 1 and 20 mg lozenge, 1/2 lozenge 4 times a day.    Discussed the use of methylene blue to help with pain, circulation.  Resources provided.  You may call Dr. Berry if you would like to look into further use.    Follow-up with a video visit in 2 months

## 2023-12-27 NOTE — PROGRESS NOTES
Patient presents to the clinic today for a visit  with ADRI DIXON MD            8/18/2023     1:57 PM 10/30/2023     1:54 PM 12/27/2023     2:28 PM   PEG Score   PEG Total Score 8.33 10 9       UDS/CSA-  4/18/23 8/18/23  Medications-    QUESTIONS:clonidine     Tiny Pulido MA  [facility] [place]

## 2023-12-27 NOTE — PROGRESS NOTES
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    Reviews today she has been sick over the last few week with upper GI problems and attempt.  She indeed had swelling in her legs from her feet up to her thighs.  She was not able to wear her shoes.  She did put a message into her oncologist.    It is much better today, she is not sure why.  Does not note any other medical changes.  Does not think she was particularly spending time sitting in chair with her hips flexed, obstructing her lymphatic flow.    She will be seeing the oncologist, has not seen for 5 years.  Review some frustration as she told them for several years having pain that she felt was related to the tamoxifen and felt it was dismissed.  They did however find her non-Hodgkin's lymphoma.    She has been off the ketamine for 2 months as it turns out there was a problem with getting the right number for her credit card to refill.  There was some benefit.    Continues with the vitamin D and K2.    Her feet can continue to change color at times, feel cold to the touch.  Continues on the oxycodone 30 mg every 4 hours.    Will see her oral surgeon 1/6, 2 addressed some of the teeth that were broken during intubation and needs dentures.    Discussed she is admitted variety allergies, whether that makes pain worse.  Discussed with histamine release can sensitize C receptors and she indeed has more problems with deep aching pain.  Has taken hydroxyzine before withdrawal, did not take regularly.    Reviewed off label use of methylene blue open using recently for a variety of conditions including her concerns that  her feet have significant neuropathy and feel cold.  She would like to read more about it she takes on many medications.    Last urine drug test in April PMP reviewed            Medications:  Current Outpatient Medications   Medication Sig Dispense Refill    BRIANA RAMIREZ PO Take 50 mg by mouth      acetaminophen (TYLENOL) 325 MG tablet Take 2 tablets (650 mg) by mouth every 4 hours as needed for mild pain or fever 100 tablet     albuterol (VENTOLIN HFA) 108 (90 Base) MCG/ACT inhaler Inhale 1-2 puffs into the lungs every 4 hours as needed for shortness of breath / dyspnea or wheezing 18 g 11    calcitRIOL (ROCALTROL) 0.25 MCG capsule TAKE TWO CAPSULES BY MOUTH IN THE MORNING AND TAKE ONE CAPSULE BY MOUTH IN THE EVENING. 270 capsule 4    calcium citrate-vitamin D (CALCIUM CITRATE + D3) 315-250 MG-UNIT TABS per tablet Take 2 tablets by mouth 2 times daily 60 tablet 2    celecoxib (CELEBREX) 200 MG capsule Take 1 capsule (200 mg) by mouth 2 times daily 60 capsule 1    celecoxib (CELEBREX) 200 MG capsule Take 1 capsule (200 mg) by mouth TWO times daily 60 capsule 3    cetirizine (ZYRTEC ALLERGY) 10 MG tablet Take 1 tablet (10 mg) by mouth 3 times daily On hold for lab test. 90 tablet 0    Cholecalciferol (VITAMIN D3) 50 MCG (2000 UT) CAPS Take 2,000 Units by mouth daily 90 capsule 1    cromolyn (OPTICROM) 4 % ophthalmic solution Place 1 drop into both eyes 4 times daily 10 mL 0    cromolyn sodium (NASALCROM) 5.2 MG/ACT nasal aerosol SPRAY ONE SPRAY( 1 ML) IN NOSTRIL DAILY 1 mL 3    cyclobenzaprine (FLEXERIL) 10 MG tablet Take 1 tablet (10 mg) by mouth 4 times daily This is a 90 day supply 360 tablet 1    cyclobenzaprine (FLEXERIL) 10 MG tablet Take 1 tablet (10 mg) by mouth 3 times daily as needed for muscle spasms 30 tablet 0    diclofenac (VOLTAREN) 1 % topical gel Apply affected area two times daily as needed using enclosed dosing card. (2-4 grams to chest wall) 100 g 1    famotidine (PEPCID) 20 MG tablet Take 1 tablet  (20 mg) by mouth 2 times daily This is a 90 day supply 180 tablet 1    fluconazole (DIFLUCAN) 150 MG tablet Take 1 tablet (150 mg) by mouth once as needed (yeast infection symptoms) Repeat in 5 days if still having symptoms. 2 tablet 0    fluticasone (FLONASE) 50 MCG/ACT nasal spray Spray 1-2 sprays into both nostrils daily 16 g 2    hydrOXYzine HCl (ATARAX) 25 MG tablet One tab 3-4 times a day to augment oxycodone 270 tablet 3    ketamine HCl POWD 120 mg lozenge one-half to one lozenge up to 4 times a day as needed, #120 120 g 3    ketamine HCl POWD Ketamine 10% and lidocaine 10% in topical cream, apply to feet tid , 90 gm tube 90 g 3    KLOR-CON 20 MEQ CR tablet TAKE ONE TABLET BY MOUTH TWICE DAILY 60 tablet 0    levothyroxine (SYNTHROID/LEVOTHROID) 112 MCG tablet Take 2 tablets (224 mcg) by mouth daily 180 tablet 4    lidocaine (XYLOCAINE) 5 % external ointment Apply quarter size amount to chest and back up to 3 times daily as needed for pain. 350 g 1    lidocaine-prilocaine (EMLA) cream Apply topically as needed for moderate pain 60 g 1    lisinopril-hydrochlorothiazide (ZESTORETIC) 10-12.5 MG tablet Take 1 tablet by mouth daily 30 tablet 2    magnesium oxide (MAG-OX) 400 MG tablet Take 1 tablet (400 mg) by mouth 3 times daily 90 tablet 4    Menaquinone-7 (VITAMIN K2) 100 MCG CAPS Take 1 capsule by mouth 2 times daily 60 capsule 3    montelukast (SINGULAIR) 10 MG tablet Take 2 tablets (20 mg) by mouth 2 times daily TAKE TWO TABLETS BY MOUTH TWICE DAILY Strength: 10 mg 120 tablet 11    naloxegol (MOVANTIK) 25 MG TABS tablet Take 1 tablet (25 mg) by mouth every morning (before breakfast) 30 tablet 3    naloxone (NARCAN) nasal spray Spray 1 spray (4 mg) into one nostril alternating nostrils as needed for opioid reversal every 2-3 minutes until assistance arrives 0.2 mL 0    olopatadine (PATANOL) 0.1 % ophthalmic solution INSTILL ONE DROP INTO EACH EYE TWICE DAILY 5 mL 0    omeprazole (PRILOSEC) 10 MG DR capsule  Take 2 capsules (20 mg) by mouth daily 30 capsule 1    ondansetron (ZOFRAN ODT) 8 MG ODT tab DISSOLVE ONE TABLET IN MOUTH EVERY EIGHT HOURS AS NEEDED FOR NAUSEA Strength: 8 mg 30 tablet 1    oxyCODONE IR (ROXICODONE) 30 MG tablet Take 1 tablet (30 mg) by mouth 6 times daily Fill 12/27 for 12/28(14 day RX) 84 tablet 0    Probiotic Product (PROBIOTIC DAILY PO) Take 1 capsule by mouth daily Lacto acid bifidobacterium      ranitidine (ZANTAC) 75 MG tablet Take 1 tablet (75 mg) by mouth 3 times daily 90 tablet 3    SUMAtriptan (IMITREX) 50 MG tablet Take 1 tablet by mouth as needed for migraine . May repeat dose in 2 hours as needed. Maximum dose 4 tablets in 24 hours 15 tablet 11    tobramycin (TOBREX) 0.3 % ophthalmic solution ADMINISTER 1 2 DROPS TO THE RIGHT EYE EVERY 4 (FOUR) HOURS FOR 7 DAYS.      tobramycin-dexamethasone (TOBRADEX) 0.3-0.1 % ophthalmic suspension INSTILL ONE DROP INTO BOTH EYES THREE TIMES DAILY      triamcinolone (KENALOG) 0.025 % ointment Apply topically as needed  3    UNABLE TO FIND Take 2 capsules by mouth 3 times daily Muscle Mag. 2 caps contain B1 20mg, B2 20mg, B6 10mg, magesium 20mg, manganese 2mg.      UNABLE TO FIND 3 tablets 3 times daily MEDICATION NAME: calcium D-Glucarate   3 caps contain 180mg of elemental calcium.      UNABLE TO FIND 1 tablet daily MEDICATION NAME: Pure Encapsulations      Triamcinolone Acetonide (AZMACORT IN) Inhale 2 puffs into the lungs as needed (Patient not taking: Reported on 12/27/2023)             Physical Exam:  Blood pressure (!) 156/89, pulse (!) 125, weight 80.5 kg (177 lb 6.4 oz), SpO2 100%, not currently breastfeeding.    Alert, clear sensorium.  Wearing a mask with concern for dentition.  No pain behavior.    Lower extremity are not swollen today, able to wear shoes.    Affect congruent.  Reviewed some stressors        Assessment:   Chemotherapy-induced peripheral neuropathy.  We have used a variety of neuropathic agents.  This dose of oxycodone has  been most helpful, was able to work on this dose.  She had her previous long-term psychotherapist, and we were decreasing doses to reflect her previous function and struggles with changes    Plan: Continue the oxycodone 30 mg every 4 hours.    Resume the ketamine 120 mg lozenges, 1/2 lozenge 4 times a day, feels the most tolerable flavors to [facility].    She will use hydroxyzine 25 mg every 3-4 hours regularly to skip augments the opioids.    She will call if she would like to pursue the off label use of methylene blue.    Total time 34 minutes  minutes spent on the date of encounter doing chart review, history, and exam documentation and further activities as noted above.     Thanh Berry MD  [facility]

## 2023-12-28 ENCOUNTER — OFFICE VISIT (OUTPATIENT)
Dept: URGENT CARE | Facility: URGENT CARE | Age: 63
End: 2023-12-28
Payer: COMMERCIAL

## 2023-12-28 ENCOUNTER — NURSE TRIAGE (OUTPATIENT)
Dept: FAMILY MEDICINE | Facility: CLINIC | Age: 63
End: 2023-12-28
Payer: COMMERCIAL

## 2023-12-28 VITALS
RESPIRATION RATE: 18 BRPM | HEART RATE: 120 BPM | BODY MASS INDEX: 29.45 KG/M2 | TEMPERATURE: 98.7 F | WEIGHT: 177 LBS | OXYGEN SATURATION: 98 % | SYSTOLIC BLOOD PRESSURE: 172 MMHG | DIASTOLIC BLOOD PRESSURE: 101 MMHG

## 2023-12-28 DIAGNOSIS — I16.0 HYPERTENSIVE URGENCY: ICD-10-CM

## 2023-12-28 DIAGNOSIS — R51.9 ACUTE INTRACTABLE HEADACHE, UNSPECIFIED HEADACHE TYPE: Primary | ICD-10-CM

## 2023-12-28 DIAGNOSIS — G43.519 INTRACTABLE PERSISTENT MIGRAINE AURA WITHOUT CEREBRAL INFARCTION AND WITHOUT STATUS MIGRAINOSUS: ICD-10-CM

## 2023-12-28 PROCEDURE — 99215 OFFICE O/P EST HI 40 MIN: CPT | Performed by: PHYSICIAN ASSISTANT

## 2023-12-28 NOTE — PROGRESS NOTES
Chief Complaint   Patient presents with    Urgent Care    Headache     Per patient states she has been having a headache for a couple of days , decreased sleeping and bp is high today, states the pain is in her occipital part of the head and earlier this year she had a fracture on her neck , passed out and does not remember what happened, states she did take tylenol, celebrex and flexeril to help symptoms but still has headache.        ASSESSMENT/PLAN:  Elvin was seen today for urgent care and headache.    Diagnoses and all orders for this visit:    Acute intractable headache, unspecified headache type    Hypertensive urgency    Patient with headache that is not her typical migraine pattern not responding to her typical medications with vomiting and notably elevated blood pressure with tachycardia.    Needs to be evaluated higher level of care.  Recommend going to the emergency room.  Patient agrees to go    Alonzo Figueroa PA-C      SUBJECTIVE:  Tisha is a 63 year old female with past medical history of migraines, chronic pain, cervical fracture, peripheral neuropathy, on chronic opioids among other comorbidities who presents to urgent care with 2 days of headache.  This is different than her usual migraine pattern.  She had sudden nausea and vomiting 2 or 3 times yesterday.  Headache was worse last night and had difficulty falling asleep.  Today she took Tylenol, Celebrex and Flexeril and has had some improvement.  The headache is severe and radiates from the front to the back where she had her cervical fracture.  She is noticed her blood pressures been much higher than usual over the last 2 days as well.  Is 170/100 here but states it was higher yesterday.    ROS: Pertinent ROS neg other than the symptoms noted above in the HPI.     OBJECTIVE:  BP (!) 172/101   Pulse 120   Temp 98.7  F (37.1  C) (Tympanic)   Resp 18   Wt 80.3 kg (177 lb)   LMP  (LMP Unknown)   SpO2 98%   BMI 29.45 kg/m     GENERAL:  Appears in pain, alert and no distress  EYES: Eyes grossly normal to inspection, PERRL and conjunctivae and sclerae normal, photophobia, right eyelid twitching persistently  HENT: ear canals and TM's normal, nose and mouth without ulcers or lesions  CV: Tachycardic, normal S1 S2, no S3 or S4, no murmur, click or rub  MS: no gross musculoskeletal defects noted, no edema  SKIN: no suspicious lesions or rashes  NEURO: Normal strength and tone, mentation intact and speech normal, cranial to 12 grossly intact at this time, finger-nose normal    DIAGNOSTICS    No results found for any visits on 12/28/23.     Current Outpatient Medications   Medication    ACAI BERRY PO    acetaminophen (TYLENOL) 325 MG tablet    albuterol (VENTOLIN HFA) 108 (90 Base) MCG/ACT inhaler    calcitRIOL (ROCALTROL) 0.25 MCG capsule    calcium citrate-vitamin D (CALCIUM CITRATE + D3) 315-250 MG-UNIT TABS per tablet    celecoxib (CELEBREX) 200 MG capsule    celecoxib (CELEBREX) 200 MG capsule    cetirizine (ZYRTEC ALLERGY) 10 MG tablet    Cholecalciferol (VITAMIN D3) 50 MCG (2000 UT) CAPS    cromolyn (OPTICROM) 4 % ophthalmic solution    cromolyn sodium (NASALCROM) 5.2 MG/ACT nasal aerosol    cyclobenzaprine (FLEXERIL) 10 MG tablet    cyclobenzaprine (FLEXERIL) 10 MG tablet    diclofenac (VOLTAREN) 1 % topical gel    famotidine (PEPCID) 20 MG tablet    fluconazole (DIFLUCAN) 150 MG tablet    fluticasone (FLONASE) 50 MCG/ACT nasal spray    hydrOXYzine HCl (ATARAX) 25 MG tablet    ketamine HCl POWD    ketamine HCl POWD    KLOR-CON 20 MEQ CR tablet    levothyroxine (SYNTHROID/LEVOTHROID) 112 MCG tablet    lidocaine (XYLOCAINE) 5 % external ointment    lidocaine-prilocaine (EMLA) cream    lisinopril-hydrochlorothiazide (ZESTORETIC) 10-12.5 MG tablet    magnesium oxide (MAG-OX) 400 MG tablet    Menaquinone-7 (VITAMIN K2) 100 MCG CAPS    montelukast (SINGULAIR) 10 MG tablet    naloxegol (MOVANTIK) 25 MG TABS tablet    naloxone (NARCAN) nasal spray     olopatadine (PATANOL) 0.1 % ophthalmic solution    omeprazole (PRILOSEC) 10 MG DR capsule    ondansetron (ZOFRAN ODT) 8 MG ODT tab    oxyCODONE IR (ROXICODONE) 30 MG tablet    Probiotic Product (PROBIOTIC DAILY PO)    ranitidine (ZANTAC) 75 MG tablet    SUMAtriptan (IMITREX) 50 MG tablet    tobramycin (TOBREX) 0.3 % ophthalmic solution    tobramycin-dexamethasone (TOBRADEX) 0.3-0.1 % ophthalmic suspension    triamcinolone (KENALOG) 0.025 % ointment    UNABLE TO FIND    UNABLE TO FIND    UNABLE TO FIND    Triamcinolone Acetonide (AZMACORT IN)     No current facility-administered medications for this visit.      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    Breast cancer (H)    Infection    Cellulitis    Family history of uterine cancer    Lymphedema of breast    Atrophic vaginitis    Neuropathic pain of chest    Papillary carcinoma, follicular variant (H)    Medication side effects    S/P thyroidectomy    Postsurgical subclinical hypoparathyroidism (H)    Postsurgical hypothyroidism    S/P breast reconstruction    Chronic pain, post bilateral mastectomies and breast reconstruction     Idiopathic mast cell activation syndrome (H24)    Mild intermittent asthma without complication    Chest wall pain    Intertrigo    Rash    Migraine without status migrainosus, not intractable, unspecified migraine type    Status post bariatric surgery    Hypocalcemia    Personal history of malignant neoplasm of breast    High grade B-cell lymphoma (H)    DLBCL (diffuse large B cell lymphoma) (H)    Diffuse large B cell lymphoma (H)    Bilateral lower extremity edema    Kidney stone    Chronic pain disorder    Long term current use of opiate analgesic    Compound heterozygous MTHFR mutation [id number]    History of migraine headaches     Intestinal malabsorption    Serotonin neurotoxicity    History of gastric bypass    Opioid-induced hyperalgesia    H/O drug withdrawal syndrome    Anxiety    Constipation    Lymphoma (H)    Opioid withdrawal (H)    Drug-induced polyneuropathy (H24)    Eye problems    Muscle cramps    Benign essential hypertension    Chronic, continuous use of opioids      Past Medical History:   Diagnosis Date    Allergic rhinitis due to animal dander     Asthma     Breast cancer (H) 12    right    Cancer (H)     Chronic kidney disease     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 No real need for Epipen for this alone.    GDM (gestational diabetes mellitus)     w first pregnancy only    Gestational hypertension     Lymphedema     jackson arms, chest    Migraine     Neuropathy associated with cancer (H)     Papillary carcinoma, follicular variant (H) 2013    pT3, N1, Mx, thyroid    Post-surgical hypothyroidism     Renal disease     kidney stones    Rhinitis, allergic to other allergen     Right Breast mass and microcalcifications 2011    Seasonal allergic conjunctivitis     Seasonal allergic rhinitis 11 skin tests pos. for: dog/M/T/G/W--NEGATIVE FOOD TESTS FOR: shrimp, crab, lobster, coconut     Past Surgical History:   Procedure Laterality Date    BACK SURGERY  ,    Bulging disc w nerve root impigment    BIOPSY BREAST      right    BIOPSY BREAST  11    right, core and sterotactic    BONE MARROW BIOPSY, BONE SPECIMEN, NEEDLE/TROCAR Left 10/3/2017    Procedure: BIOPSY BONE MARROW;  Bone Marrow Biopsy with aspirate;  Surgeon: Amelia Watkins PA-C;  Location: [facility] OR    BREAST SURGERY      BYPASS GASTRIC, CHOLECYSTECTOMY, COMBINED       SECTION       SECTION      COLONOSCOPY   11/11    normal    COSMETIC SURGERY  8-    Final Stage of Mastectomy    COSMETIC SURGERY      DAVINCI HYSTERECTOMY TOTAL, BILATERAL SALPINGO-OOPHORECTOMY, COMBINED  12/12/2012    Procedure: COMBINED DAVINCI HYSTERECTOMY TOTAL, SALPINGO-OOPHORECTOMY;  Davinci Total Laparoscopic Hysterectomy, Bilateral Salpingo Oophorectomy, Pelvic Washings, Cystoscopy;  Surgeon: Evie Sheikh MD;  Location: [facility] OR    ENT SURGERY  1982    ESOPHAGOSCOPY, GASTROSCOPY, DUODENOSCOPY (EGD), COMBINED N/A 9/14/2017    Procedure: COMBINED ENDOSCOPIC ULTRASOUND, ESOPHAGOSCOPY, GASTROSCOPY, DUODENOSCOPY (EGD), FINE NEEDLE ASPIRATE/BIOPSY;  Esophagogastroduodenoscopy, Endoscopic Ultrasound, with fine needle biopsy aspirate;  Surgeon: Patrick Robles MD;  Location: [facility] OR    EXTRACORPOREAL SHOCK WAVE LITHOTRIPSY, CYSTOSCOPY, INSERT STENT URETER(S), COMBINED Right 8/19/2019    Procedure: EXTRACORPORAL SHOCKWAVE LITHOTRIPSY,RIGHT URETERAL STENT PLACEMENT;  Surgeon: Pablo Graham MD;  Location:  OR    GI SURGERY  11-    Rachael-en Y w cholecystectomy    GYN SURGERY  1/6/89,1/10/92    2 c-sections    HYSTERECTOMY      HYSTERECTOMY, PAP NO LONGER INDICATED  12/12    DeVinci assister lap hyst with BSO    INSERT PORT VASCULAR ACCESS Right 10/4/2017    Procedure: INSERT PORT VASCULAR ACCESS;  Port Placement;  Surgeon: Jc Goodman PA-C;  Location: [facility] OR    IR PORT REMOVAL RIGHT  11/11/2019    IRRIGATION AND DEBRIDEMENT BREAST  3/1/2012    Procedure:IRRIGATION AND DEBRIDEMENT BREAST; Irrigation and Debridement, Wound Closure Right Breast; Surgeon:JUNG GUILLEN; Location:[facility] OR    MASTECTOMY, RECONSTRUCT BREAST, COMBINED  1/30/2012    Procedure:COMBINED MASTECTOMY, RECONSTRUCT BREAST; Bilateral Mastectomies, Right Axillary [place] Node Biopsy, Bilateral Breast Reconstruction with Tissue Expanders, Reconstruction of inframammary fold, bilateral pain management systems; Surgeon:ANUPAM CAMPBELL; [facility]     RECONSTRUCT BREAST  8/31/2012    Procedure: RECONSTRUCT BREAST;  Bilateral 2nd stage breast reconstruction, revision,      REMOVE PORT VASCULAR ACCESS Right 11/11/2019    Procedure: Right Port Removal;  Surgeon: Ely Leslie PA-C;  Location: [facility] OR    SOFT TISSUE SURGERY  1-30-12    Mastectomy w severe myofascial pain syndrome    THYROIDECTOMY  7/10/2013    Procedure: THYROIDECTOMY;  Total Thyroidectomy with central neck dissection;  Surgeon: Indiana Sneed MD;  Location: [facility] OR    THYROIDECTOMY      TONSILLECTOMY      childhood    TONSILLECTOMY      ZZC LAPAROSCOPIC GASTRIC RESTRICTIVE PX, W/GASTRIC BYPASS/ RACHAEL-EN-Y, < 150CM  2007    Rachael en Y?     Family History   Problem Relation Age of Onset    Allergies Mother     Arthritis Mother     Cancer Mother     Hypertension Mother     Hyperlipidemia Mother     Cerebrovascular Disease Mother         s/p brain surgery    Breast Cancer Mother     Depression Mother     Anxiety Disorder Mother     Anesthesia Reaction Mother     Asthma Mother     Skin Cancer Mother     Heart Disease Father     Diabetes Father     Coronary Artery Disease Father     Hypertension Father     Hyperlipidemia Father     Cerebrovascular Disease Father         Multiple strokes    Obesity Father     Diabetes Brother     Depression Brother     Hypertension Brother     Cancer Maternal Grandfather     Prostate Cancer Maternal Grandfather         Prostrate and Bladder    Other Cancer Maternal Grandfather         Pancreatic 1988, Bladder 1977    Cancer Maternal Grandmother     Breast Cancer Maternal Grandmother     Skin Cancer Maternal Grandmother     Cancer Brother 57        pancreatic cancer    Diabetes Brother     Breast Cancer Cousin         Grand mothers sister    Other Cancer Brother         Stage 3 Pancreatic 2-2015    Depression Brother     Asthma Brother     Obesity Brother     Anesthesia Reaction Other         H/a, itching, drug interactions    Asthma Other     Cancer Brother          Pancreatic     Thyroid Disease No family hx of     Cancer Brother     Depression Brother     Heart Disease Paternal Grandmother     Hypertension Paternal Grandmother     Coronary Artery Disease Paternal Grandmother     Diabetes Paternal Grandfather     Coronary Artery Disease Paternal Grandfather      Social History     Tobacco Use    Smoking status: Never    Smokeless tobacco: Never    Tobacco comments:     no 2nd hand smoke exposure   Substance Use Topics    Alcohol use: No     Comment: rare              The plan of care was discussed with the patient. They understand and agree with the course of treatment prescribed. A printed summary was given including instructions and medications.  The use of Dragon/[employer] dictation services may have been used to construct the content in this note; any grammatical or spelling errors are non-intentional. Please contact the author of this note directly if you are in need of any clarification.

## 2023-12-28 NOTE — TELEPHONE ENCOUNTER
"2 days strait chronic headache  Tylmnol is not touching it   Worse than migranes and has cused vommiting.   Dizziness, light headed.   Advised to go to ED, would like to be seen in clinic advised that they will send her to the ED.   Transferred call to [place] to schedule for same day..  Thanks,   Hector Jacques RN  [facility]       Reason for Disposition   SEVERE headache, states 'worst headache' of life    Additional Information   Negative: Difficult to awaken or acting confused (e.g., disoriented, slurred speech)   Negative: Weakness of the face, arm or leg on one side of the body and new-onset   Negative: Numbness of the face, arm or leg on one side of the body and new-onset   Negative: Loss of speech or garbled speech and new-onset   Negative: Passed out (i.e., fainted, collapsed and was not responding)   Negative: Sounds like a life-threatening emergency to the triager   Negative: Followed a head injury within last 3 days   Negative: Traumatic Brain Injury (TBI) is suspected   Negative: Sinus pain of forehead and yellow or green nasal discharge   Negative: Pregnant   Negative: Unable to walk without falling   Negative: Stiff neck (can't touch chin to chest)   Negative: Possibility of carbon monoxide exposure    Answer Assessment - Initial Assessment Questions  1. LOCATION: \"Where does it hurt?\"       Forehead and wraps around in occipital area,  c 2 fracture April 18th had a black out.  2. ONET: \"When did the headache start?\" (Minutes, hours or days)       2 days   3. PATTERN: \"Does the pain come and go, or has it been constant since it started?\"    Consistent   4. SEVERITY: \"How bad is the pain?\" and \"What does it keep you from doing?\"  (e.g., Scale 1-10; mild, moderate, or severe)        - SEVERE (8-10): excruciating pain, unable to do any normal activities          5. RECURRENT SYMPTOM: \"Have you ever had headaches before?\" If Yes, ask: \"When was the last time?\" and \"What " "happened that time?\"       No   6. CAUSE: \"What do you think is causing the headache?\"      Not sure   7. MIGRAINE: \"Have you been diagnosed with migraine headaches?\" If Yes, ask: \"Is this headache similar?\"       Yes no its stronger   8. HEAD INJURY: \"Has there been any recent injury to the head?\"      No other than April 9. OTHER SYMPTOMS: \"Do you have any other symptoms?\" (fever, stiff neck, eye pain, sore throat, cold symptoms)    Eye pain, neck sore     10. PREGNANCY: \"Is there any chance you are pregnant?\" \"When was your last menstrual period?\"        No    Protocols used: Headache-A-OH    "

## 2023-12-28 NOTE — PATIENT INSTRUCTIONS
Patient with headache that is not her typical migraine pattern not responding to her typical medications with vomiting and notably elevated blood pressure with tachycardia.    Needs to be evaluated higher level of care.  Recommend going to the emergency room.

## 2023-12-29 ENCOUNTER — NURSE TRIAGE (OUTPATIENT)
Dept: NURSING | Facility: CLINIC | Age: 63
End: 2023-12-29
Payer: COMMERCIAL

## 2023-12-29 DIAGNOSIS — E89.0 POSTSURGICAL HYPOTHYROIDISM: ICD-10-CM

## 2023-12-29 DIAGNOSIS — E89.2 POSTSURGICAL HYPOPARATHYROIDISM (H): ICD-10-CM

## 2023-12-29 DIAGNOSIS — G43.519 INTRACTABLE PERSISTENT MIGRAINE AURA WITHOUT CEREBRAL INFARCTION AND WITHOUT STATUS MIGRAINOSUS: ICD-10-CM

## 2023-12-29 DIAGNOSIS — C73 PAPILLARY CARCINOMA, FOLLICULAR VARIANT (H): ICD-10-CM

## 2023-12-29 RX ORDER — SUMATRIPTAN 50 MG/1
TABLET, FILM COATED ORAL
Qty: 9 TABLET | Refills: 0 | Status: SHIPPED | OUTPATIENT
Start: 2023-12-29 | End: 2024-03-07

## 2023-12-29 NOTE — TELEPHONE ENCOUNTER
[facility] Call Center    Phone Message    May a detailed message be left on voicemail: yes     Reason for Call: Medication Refill Request    Has the patient contacted the pharmacy for the refill? Yes   Name of medication being requested: SUMAtriptan (IMITREX) 50 MG tablet   Provider who prescribed the medication: Jamal  Pharmacy:    [facility][id number] - [place], [place] - [address] [address]. [place]      Date medication is needed: ASAP patient is out.     Action Taken: Other: Pain    Travel Screening: Not Applicable

## 2023-12-29 NOTE — TELEPHONE ENCOUNTER
Received call from patient. She is calling to request urgent refill on Imitrex. She was in the ED yesterday for elevated blood pressure and headache.    She explains the ED provider informed her to contact her PCP regarding refill, as she was not discharged with any medications to help with her headache.     Routing to provider to please advise.     RADHA Radford RN  [facility]

## 2023-12-30 NOTE — TELEPHONE ENCOUNTER
"Patient calling. She's had a headache for 4 days. She's been seen in the ED; she was given subcutaneous Imitrix, and she's out of her prescription for Imitrex tablets. Her eyes are sore and light-sensitive, also pain in her temples. No seizure activity or fever. Imitrex is the appropriate medication per her neurologist. Acetaminophen has been ineffective.     MD consult, Dr. ETHAN Hill Paged by RN at 6:33 PM   Reason for page: intractable migraine    Provider, Dr. Hill, returning page to [facility] at 6:34 PM  Provider Recommendations/Plan:  See order    The above information relayed to patient. Prescription sent to [facility] per patient preference.     Sadia Morales RN  [facility]  December 29, 2023, 6:40 PM    Reason for Disposition   [1] SEVERE headache (e.g., excruciating) AND [2] not improved after 2 hours of pain medicine    Additional Information   Negative: Difficult to awaken or acting confused (e.g., disoriented, slurred speech)   Negative: [1] Weakness of the face, arm or leg on one side of the body AND [2] new-onset   Negative: [1] Numbness of the face, arm or leg on one side of the body AND [2] new-onset   Negative: [1] Loss of speech or garbled speech AND [2] new-onset   Negative: Passed out (i.e., lost consciousness, collapsed and was not responding)   Negative: Sounds like a life-threatening emergency to the triager   Negative: Followed a head injury   Negative: Pregnant   Negative: Postpartum (from 0 to 6 weeks after delivery)   Negative: Traumatic Brain Injury (TBI) is suspected   Negative: Unable to walk, or can only walk with assistance (e.g., requires support)   Negative: Stiff neck (can't touch chin to chest)   Negative: Severe pain in one eye   Negative: [1] Other family members (or people in same household) with headaches AND [2] possibility of carbon monoxide exposure   Negative: [1] SEVERE headache (e.g., excruciating) AND [2] \"worst headache\" of life   Negative: [1] " SEVERE headache AND [2] sudden-onset (i.e., reaching maximum intensity within seconds to 1 hour)   Negative: [1] SEVERE headache AND [2] fever   Negative: Loss of vision or double vision  (Exception: Same as prior migraines.)   Negative: [1] Fever > 100.0 F (37.8 C) AND [2] diabetes mellitus or weak immune system (e.g., HIV positive, cancer chemo, splenectomy, organ transplant, chronic steroids)   Negative: Patient sounds very sick or weak to the triager    Protocols used: Headache-A-AH

## 2024-01-02 NOTE — TELEPHONE ENCOUNTER
Received call from patient  requesting refill(s) for     SUMAtriptan (IMITREX) 50 MG tablet     Last refilled on 12/29/23- patient was given 9 tablets by another provider    Pt last seen on 12/27/23  Next appt scheduled for 2/27/24    E-prescribe to:    [facility][id number] - [place], [place] - [address] [address] [place]     Will route to RN to review

## 2024-01-04 RX ORDER — SUMATRIPTAN 50 MG/1
TABLET, FILM COATED ORAL
Qty: 15 TABLET | Refills: 0 | Status: SHIPPED | OUTPATIENT
Start: 2024-01-04 | End: 2024-02-01

## 2024-01-04 RX ORDER — LEVOTHYROXINE SODIUM 112 UG/1
224 TABLET ORAL DAILY
Qty: 180 TABLET | Refills: 0 | Status: SHIPPED | OUTPATIENT
Start: 2024-01-04 | End: 2024-05-23

## 2024-01-06 ENCOUNTER — HEALTH MAINTENANCE LETTER (OUTPATIENT)
Age: 64
End: 2024-01-06

## 2024-01-13 DIAGNOSIS — G43.519 INTRACTABLE PERSISTENT MIGRAINE AURA WITHOUT CEREBRAL INFARCTION AND WITHOUT STATUS MIGRAINOSUS: ICD-10-CM

## 2024-01-15 RX ORDER — SUMATRIPTAN 50 MG/1
TABLET, FILM COATED ORAL
Qty: 15 TABLET | Refills: 0 | OUTPATIENT
Start: 2024-01-15

## 2024-01-19 DIAGNOSIS — G89.4 CHRONIC PAIN SYNDROME: ICD-10-CM

## 2024-01-19 RX ORDER — OXYCODONE HYDROCHLORIDE 30 MG/1
30 TABLET ORAL
Qty: 84 TABLET | Refills: 0 | Status: SHIPPED | OUTPATIENT
Start: 2024-01-19 | End: 2024-02-02

## 2024-01-19 NOTE — TELEPHONE ENCOUNTER
Patient requesting refill(s) of  oxyCODONE IR (ROXICODONE) 30 MG tablet   Last dispensed from pharmacy on 1/10/24    SUMAtriptan (IMITREX) 50 MG tablet   Last fill 12/29/23    Patient's last office/virtual visit by prescribing provider on 12/27/23  Next office/virtual appointment scheduled for 02/27/24    Last urine drug screen date 4/18/23  Current opioid agreement on file (completed within the last year) Yes Date of opioid agreement: 8/18/23    E-prescribe to   [facility] #[id number] - [place], [place]     Will route to nursing Egan for review and preparation of prescription(s).

## 2024-01-19 NOTE — TELEPHONE ENCOUNTER
[facility] Call Center    Phone Message    May a detailed message be left on voicemail: yes     Reason for Call: Medication Refill Request    Has the patient contacted the pharmacy for the refill? Yes   Name of medication being requested: oxyCODONE IR (ROXICODONE) 30 MG tablet   And  SUMAtriptan (IMITREX) 50 MG tablet   Provider who prescribed the medication: Thanh Berry MD   Pharmacy: [facility] #[id number] - [place], [place] - [address] [address]. [place]   Date medication is needed:   1/19/2024 SUMAtriptan (IMITREX) 50 MG tablet    and   1/24/2024 oxyCODONE IR (ROXICODONE) 30 MG     Reminded of 5-7 day refill request policy, patient states her pharmacy sent SUMAtriptan (IMITREX) 50 MG tablet multiple times.     Action Taken: Message routed to:  Other: [facility]    Travel Screening: Not Applicable

## 2024-01-19 NOTE — TELEPHONE ENCOUNTER
SUMAtriptan (IMITREX) 50 MG tablet  prescribed by PCP, Dr. Soto.  Appears that there is a prescription that was sent to requested pharmacy by Dr. Soto on 1/4/24. Will call patient to call the pharmacy about dispensing the medication. If the pharmacy is unable to dispense, will advise patient to call Dr. Soto's office for a refill.     Pending Prescriptions:                       Disp   Refills    oxyCODONE IR (ROXICODONE) 30 MG tablet    84 tab*0            Sig: Take 1 tablet (30 mg) by mouth 6 times daily Fill           01/24/24 for use 01/25/24 (14 day RX)    [facility][id number] - [place], [place] - [address] [address]. [place]      Call to patient. Patient will contact the pharmacy about the refill and Dr. Soto's office if a refill is needed. Wanted to know if Dr. Berry would take over prescription.

## 2024-02-01 RX ORDER — SUMATRIPTAN 50 MG/1
TABLET, FILM COATED ORAL
Qty: 15 TABLET | Refills: 0 | Status: SHIPPED | OUTPATIENT
Start: 2024-02-01 | End: 2024-03-12

## 2024-02-01 NOTE — TELEPHONE ENCOUNTER
[facility] Call Center    Phone Message    May a detailed message be left on voicemail: yes     Reason for Call: Other: Patient states that Dr. Berry told her that he would fill this medication for her due to her PCP not filling it until she is seen and she is unable to get in with her PCP for 3 months. She also states that she has been to the hospital due to not having this medication and has not had it for a month now.      Action Taken: Message routed to:  Other: [facility]    Travel Screening: Not Applicable

## 2024-02-01 NOTE — TELEPHONE ENCOUNTER
Refill requested for SUMAtriptan (IMITREX) 50 MG tablet     [facility] #[id number] - [place], [place] - [address] [address]. [place]

## 2024-02-01 NOTE — TELEPHONE ENCOUNTER
Received call from patient  requesting refill(s) for:   SUMAtriptan (IMITREX) 50 MG tablet   Last refilled on: Dec. 29, 2023         Patient last seen on: Dec. 27, 2023   Next appt scheduled for: Feb. 27, 2024     E-prescribe to:  [id number] - [place], [place] - [address] [address] [place]     Will facilitate refill.      Ruba Armstrong, Clinic Facilitator  [facility] [place]

## 2024-02-07 DIAGNOSIS — G89.4 CHRONIC PAIN SYNDROME: ICD-10-CM

## 2024-02-07 RX ORDER — OXYCODONE HYDROCHLORIDE 30 MG/1
30 TABLET ORAL
Qty: 84 TABLET | Refills: 0 | Status: SHIPPED | OUTPATIENT
Start: 2024-02-07 | End: 2024-02-08

## 2024-02-07 RX ORDER — OXYCODONE HYDROCHLORIDE 30 MG/1
30 TABLET ORAL
Qty: 84 TABLET | Refills: 0 | Status: CANCELLED | OUTPATIENT
Start: 2024-02-07

## 2024-02-08 DIAGNOSIS — G89.4 CHRONIC PAIN SYNDROME: ICD-10-CM

## 2024-02-08 RX ORDER — OXYCODONE HYDROCHLORIDE 30 MG/1
30 TABLET ORAL
Qty: 84 TABLET | Refills: 0 | Status: SHIPPED | OUTPATIENT
Start: 2024-02-08 | End: 2024-03-05

## 2024-02-12 DIAGNOSIS — G62.0 DRUG-INDUCED POLYNEUROPATHY (H): ICD-10-CM

## 2024-02-12 DIAGNOSIS — J45.20 MILD INTERMITTENT ASTHMA WITHOUT COMPLICATION: ICD-10-CM

## 2024-02-12 RX ORDER — CELECOXIB 200 MG/1
200 CAPSULE ORAL 2 TIMES DAILY
Qty: 60 CAPSULE | Refills: 0 | Status: SHIPPED | OUTPATIENT
Start: 2024-02-12 | End: 2024-02-27

## 2024-02-12 NOTE — TELEPHONE ENCOUNTER
Received fax request from pharmacy requesting refill(s) for celecoxib (CELEBREX) 200 MG capsule    Last refilled on 12/31/2023 per pharmacy.     Pt last seen on 12/27/2023.  Next appt scheduled for 2/27/2024.    Will facilitate refill.

## 2024-02-12 NOTE — TELEPHONE ENCOUNTER
Script Eprescribed to pharmacy  [place] Prescription Monitoring Program checked      Signed Prescriptions:                        Disp   Refills    celecoxib (CELEBREX) 200 MG capsule        60 cap*0        Sig: Take 1 capsule (200 mg) by mouth 2 times daily  Authorizing Provider: RYANNE JIN MD

## 2024-02-12 NOTE — TELEPHONE ENCOUNTER
Received fax from pharmacy requesting refill(s) for     albuterol (VENTOLIN HFA) 108 (90 Base) MCG/ACT inhaler    Date last filled 10.13.2022    Last Appt Date:10.13.2022    Next Appt scheduled: 0    Pharmacy:   [id number] - [place], [place] - [address],

## 2024-02-12 NOTE — TELEPHONE ENCOUNTER
Refill request for Celebrex 200 mg capsules  Last filled 12/31/23  [facility] on [address]   no

## 2024-02-13 RX ORDER — ALBUTEROL SULFATE 90 UG/1
1-2 AEROSOL, METERED RESPIRATORY (INHALATION) EVERY 4 HOURS PRN
Qty: 18 G | Refills: 3 | Status: SHIPPED | OUTPATIENT
Start: 2024-02-13 | End: 2024-07-30

## 2024-02-15 ENCOUNTER — TELEPHONE (OUTPATIENT)
Dept: FAMILY MEDICINE | Facility: CLINIC | Age: 64
End: 2024-02-15

## 2024-02-15 NOTE — TELEPHONE ENCOUNTER
Patient Quality Outreach    Patient is due for the following:   Asthma  -  ACT needed, Asthma follow-up visit, and AAP  Depression  -  PHQ-9 needed and Depression follow-up visit  Physical Preventive Adult Physical      Topic Date Due    Hepatitis A Vaccine (1 of 2 - Risk 2-dose series) Never done    Polio Vaccine (2 of 3 - Adult catch-up series) 05/06/1997    Flu Vaccine (1) 09/01/2023    COVID-19 Vaccine (5 - 2023-24 season) 09/01/2023       Next Steps:   Schedule a office visit for depression/anxiety and asthma Adult Preventative    Type of outreach:    Sent I[employer]le message.      Questions for provider review:    None           Nelly Dias, [facility]

## 2024-02-27 ENCOUNTER — VIRTUAL VISIT (OUTPATIENT)
Dept: PALLIATIVE MEDICINE | Facility: OTHER | Age: 64
End: 2024-02-27
Attending: ANESTHESIOLOGY
Payer: COMMERCIAL

## 2024-02-27 DIAGNOSIS — G89.4 CHRONIC PAIN DISORDER: ICD-10-CM

## 2024-02-27 DIAGNOSIS — R07.89 CHEST WALL PAIN: ICD-10-CM

## 2024-02-27 DIAGNOSIS — G89.4 CHRONIC PAIN SYNDROME: ICD-10-CM

## 2024-02-27 PROCEDURE — 99213 OFFICE O/P EST LOW 20 MIN: CPT | Mod: 95 | Performed by: ANESTHESIOLOGY

## 2024-02-27 RX ORDER — CELECOXIB 200 MG/1
CAPSULE ORAL
Qty: 60 CAPSULE | Refills: 3 | Status: SHIPPED | OUTPATIENT
Start: 2024-02-27

## 2024-02-27 RX ORDER — CYCLOBENZAPRINE HCL 10 MG
10 TABLET ORAL 4 TIMES DAILY
Qty: 360 TABLET | Refills: 1 | Status: SHIPPED | OUTPATIENT
Start: 2024-02-27 | End: 2024-05-08

## 2024-02-27 RX ORDER — METHYLENE BLUE
POWDER (GRAM) MISCELLANEOUS
Qty: 90 G | Refills: 2 | Status: SHIPPED | OUTPATIENT
Start: 2024-02-27 | End: 2024-06-03

## 2024-02-27 RX ORDER — MAGNESIUM OXIDE 400 MG/1
400 TABLET ORAL 3 TIMES DAILY
Qty: 90 TABLET | Refills: 4 | Status: SHIPPED | OUTPATIENT
Start: 2024-02-27 | End: 2024-06-03

## 2024-02-27 ASSESSMENT — PAIN SCALES - PAIN ENJOYMENT GENERAL ACTIVITY SCALE (PEG)
INTERFERED_GENERAL_ACTIVITY: 10 - COMPLETELY INTERFERES
PEG_TOTALSCORE: 9
INTERFERED_ENJOYMENT_LIFE: 10 - COMPLETELY INTERFERES
INTERFERED_ENJOYMENT_LIFE: 10
PEG_TOTALSCORE: 9
INTERFERED_GENERAL_ACTIVITY: 10
AVG_PAIN_PASTWEEK: 7
AVG_PAIN_PASTWEEK: 7

## 2024-02-27 ASSESSMENT — PAIN SCALES - GENERAL: PAINLEVEL: SEVERE PAIN (7)
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
## 2024-02-27 NOTE — PROGRESS NOTES
Patient presents to the clinic today for a visit with ADRI DIXON MD regarding Pain Management.    UDS/CSA: 8/18/202    Medications:   oxyCODONE IR (ROXICODONE) 30 MG tablet   celecoxib (CELEBREX) 200 MG capsule   montelukast (SINGULAIR) 10 MG tablet     Notes: pt requesting refill of oxycodone 30mg, montelukast (SINGULAIR) 10 MG tablet  and celebrex by next week.     Flako Ch   Rooming Facilitator        Elvin is a 63 year old who is being evaluated via a billable video visit.      How would you like to obtain your AVS? Mail a copy  If the video visit is dropped, the invitation should be resent by: Text to cell phone: [phone]  Will anyone else be joining your video visit? No      Is Pt currently in [place]? Yes    NOTE:  If Pt is not in [place], Appointment needs to be canceled and rescheduled.

## 2024-02-27 NOTE — PATIENT INSTRUCTIONS
[facility] Number:  [phone]  Call with any questions about your care and for scheduling assistance.   Calls are returned Monday through Friday between 8 AM and 4:30 PM. We usually get back to you within 2 business days depending on the issue/request.    If we are prescribing your medications:  For opioid medication refills, call the clinic or send a [employer] message 7 days in advance.  Please include:  Name of requested medication  Name of the pharmacy.  For non-opioid medications, call your pharmacy directly to request a refill. Please allow 3-4 days to be processed.   Per [place] State Law:  All controlled substance prescriptions must be filled within 30 days of being written.    For those controlled substances allowing refills, pickup must occur within 30 days of last fill.      We believe regular attendance is key to your success in our program!    Any time you are unable to keep your appointment we ask that you call us at least 24 hours in advance to cancel.This will allow us to offer the appointment time to another patient.   Multiple missed appointments may lead to dismissal from the clinic.     PLAN:    Off label trial of methylene blue to help with pain, inflammation, circulation 10 mg capsule from [facility] pharmacy 1 in the morning for 5 days hold for 2 days 1 morning and noon for 5-1/2 days if still just shoulder pain increased after he had morning 1 at noon 5 days on 2 days off.    Discussed the use of a grounding mats and grounding  sheets to help with circulation.     Continue oxycodone 30 mg every 4 hours.    Continue ketamine 120 mg lozenges, 1/2 lozenge 4 times a day      Continue hydroxyzine 5 mg up to 4 times a day.    You are working to contact your primary care provider for refills of Synthroid and Imitrex.    Follow-up with Dr. Berry in the clinic in 3 months

## 2024-02-29 RX ORDER — OXYCODONE HYDROCHLORIDE 30 MG/1
30 TABLET ORAL
Qty: 84 TABLET | Refills: 0 | Status: CANCELLED | OUTPATIENT
Start: 2024-02-29

## 2024-03-05 DIAGNOSIS — G89.4 CHRONIC PAIN SYNDROME: ICD-10-CM

## 2024-03-05 RX ORDER — OXYCODONE HYDROCHLORIDE 30 MG/1
30 TABLET ORAL
Qty: 84 TABLET | Refills: 0 | Status: SHIPPED | OUTPATIENT
Start: 2024-03-05 | End: 2024-03-06

## 2024-03-06 DIAGNOSIS — G89.4 CHRONIC PAIN SYNDROME: ICD-10-CM

## 2024-03-06 RX ORDER — OXYCODONE HYDROCHLORIDE 30 MG/1
30 TABLET ORAL
Qty: 84 TABLET | Refills: 0 | Status: SHIPPED | OUTPATIENT
Start: 2024-03-06 | End: 2024-03-18

## 2024-03-06 NOTE — TELEPHONE ENCOUNTER
[facility] Call [facility]    Phone Message    May a detailed message be left on voicemail: yes     Reason for Call: Other: Patient calling stating that her Pharmacy does not have the Oxy in stock.  Can they resend the script to [facility]?  Please call her back when it is sent over.      Action Taken: Message routed to:  Other: [facility]    Travel Screening: Not Applicable

## 2024-03-06 NOTE — TELEPHONE ENCOUNTER
Patient of Dr. Berry  oxyCODONE IR (ROXICODONE) 30 MG tablet out of stock at [facility] on [address]. Will queue up to send to [facility] on [address] [facility] in [place]. Script sent to previous pharmacy cancelled.     Pending Prescriptions:                       Disp   Refills    oxyCODONE IR (ROXICODONE) 30 MG tablet    84 tab*0            Sig: Take 1 tablet (30 mg) by mouth 6 times daily Fill           3/6 for 3/7(14 day RX)    [facility] #[id number] - [place], [place] - [address] [place] [address]

## 2024-03-07 ENCOUNTER — OFFICE VISIT (OUTPATIENT)
Dept: FAMILY MEDICINE | Facility: CLINIC | Age: 64
End: 2024-03-07
Payer: COMMERCIAL

## 2024-03-07 VITALS
OXYGEN SATURATION: 98 % | TEMPERATURE: 97.8 F | DIASTOLIC BLOOD PRESSURE: 97 MMHG | SYSTOLIC BLOOD PRESSURE: 135 MMHG | WEIGHT: 171 LBS | HEART RATE: 128 BPM | BODY MASS INDEX: 28.46 KG/M2

## 2024-03-07 DIAGNOSIS — R30.0 DYSURIA: ICD-10-CM

## 2024-03-07 DIAGNOSIS — E55.9 VITAMIN D DEFICIENCY: ICD-10-CM

## 2024-03-07 DIAGNOSIS — E89.2 POSTSURGICAL HYPOPARATHYROIDISM (H): ICD-10-CM

## 2024-03-07 DIAGNOSIS — R00.0 TACHYCARDIA: ICD-10-CM

## 2024-03-07 DIAGNOSIS — G43.519 INTRACTABLE PERSISTENT MIGRAINE AURA WITHOUT CEREBRAL INFARCTION AND WITHOUT STATUS MIGRAINOSUS: ICD-10-CM

## 2024-03-07 DIAGNOSIS — I10 UNCONTROLLED HYPERTENSION: Primary | ICD-10-CM

## 2024-03-07 DIAGNOSIS — Z23 NEED FOR VIRAL IMMUNIZATION: ICD-10-CM

## 2024-03-07 DIAGNOSIS — E89.0 POSTSURGICAL HYPOTHYROIDISM: ICD-10-CM

## 2024-03-07 LAB
ALBUMIN UR-MCNC: 100 MG/DL
APPEARANCE UR: ABNORMAL
BACTERIA #/AREA URNS HPF: ABNORMAL /HPF
BILIRUB UR QL STRIP: ABNORMAL
COLOR UR AUTO: YELLOW
GLUCOSE UR STRIP-MCNC: NEGATIVE MG/DL
HGB UR QL STRIP: ABNORMAL
KETONES UR STRIP-MCNC: 15 MG/DL
LEUKOCYTE ESTERASE UR QL STRIP: ABNORMAL
NITRATE UR QL: POSITIVE
PH UR STRIP: 5.5 [PH] (ref 5–7)
RBC #/AREA URNS AUTO: ABNORMAL /HPF
SP GR UR STRIP: >=1.03 (ref 1–1.03)
SQUAMOUS #/AREA URNS AUTO: ABNORMAL /LPF
UROBILINOGEN UR STRIP-ACNC: 0.2 E.U./DL
WBC #/AREA URNS AUTO: ABNORMAL /HPF

## 2024-03-07 PROCEDURE — 81001 URINALYSIS AUTO W/SCOPE: CPT | Performed by: FAMILY MEDICINE

## 2024-03-07 PROCEDURE — 84443 ASSAY THYROID STIM HORMONE: CPT | Performed by: FAMILY MEDICINE

## 2024-03-07 PROCEDURE — 82306 VITAMIN D 25 HYDROXY: CPT | Performed by: FAMILY MEDICINE

## 2024-03-07 PROCEDURE — 87086 URINE CULTURE/COLONY COUNT: CPT | Performed by: FAMILY MEDICINE

## 2024-03-07 PROCEDURE — 87186 SC STD MICRODIL/AGAR DIL: CPT | Performed by: FAMILY MEDICINE

## 2024-03-07 PROCEDURE — 36415 COLL VENOUS BLD VENIPUNCTURE: CPT | Performed by: FAMILY MEDICINE

## 2024-03-07 PROCEDURE — 87088 URINE BACTERIA CULTURE: CPT | Performed by: FAMILY MEDICINE

## 2024-03-07 PROCEDURE — 99214 OFFICE O/P EST MOD 30 MIN: CPT | Performed by: FAMILY MEDICINE

## 2024-03-07 RX ORDER — LISINOPRIL/HYDROCHLOROTHIAZIDE 10-12.5 MG
1 TABLET ORAL DAILY
Qty: 90 TABLET | Refills: 1 | Status: SHIPPED | OUTPATIENT
Start: 2024-03-07

## 2024-03-07 RX ORDER — RESPIRATORY SYNCYTIAL VIRUS VACCINE 120MCG/0.5
0.5 KIT INTRAMUSCULAR ONCE
Qty: 0.5 ML | Refills: 0 | Status: SHIPPED | OUTPATIENT
Start: 2024-03-07 | End: 2024-03-07

## 2024-03-07 ASSESSMENT — ANXIETY QUESTIONNAIRES
1. FEELING NERVOUS, ANXIOUS, OR ON EDGE: NOT AT ALL
GAD7 TOTAL SCORE: 0
7. FEELING AFRAID AS IF SOMETHING AWFUL MIGHT HAPPEN: NOT AT ALL
4. TROUBLE RELAXING: NOT AT ALL
2. NOT BEING ABLE TO STOP OR CONTROL WORRYING: NOT AT ALL
5. BEING SO RESTLESS THAT IT IS HARD TO SIT STILL: NOT AT ALL
GAD7 TOTAL SCORE: 0
6. BECOMING EASILY ANNOYED OR IRRITABLE: NOT AT ALL
GAD7 TOTAL SCORE: 0
3. WORRYING TOO MUCH ABOUT DIFFERENT THINGS: NOT AT ALL
7. FEELING AFRAID AS IF SOMETHING AWFUL MIGHT HAPPEN: NOT AT ALL
IF YOU CHECKED OFF ANY PROBLEMS ON THIS QUESTIONNAIRE, HOW DIFFICULT HAVE THESE PROBLEMS MADE IT FOR YOU TO DO YOUR WORK, TAKE CARE OF THINGS AT HOME, OR GET ALONG WITH OTHER PEOPLE: SOMEWHAT DIFFICULT
8. IF YOU CHECKED OFF ANY PROBLEMS, HOW DIFFICULT HAVE THESE MADE IT FOR YOU TO DO YOUR WORK, TAKE CARE OF THINGS AT HOME, OR GET ALONG WITH OTHER PEOPLE?: SOMEWHAT DIFFICULT

## 2024-03-07 ASSESSMENT — PATIENT HEALTH QUESTIONNAIRE - PHQ9
SUM OF ALL RESPONSES TO PHQ QUESTIONS 1-9: 2
SUM OF ALL RESPONSES TO PHQ QUESTIONS 1-9: 2
10. IF YOU CHECKED OFF ANY PROBLEMS, HOW DIFFICULT HAVE THESE PROBLEMS MADE IT FOR YOU TO DO YOUR WORK, TAKE CARE OF THINGS AT HOME, OR GET ALONG WITH OTHER PEOPLE: NOT DIFFICULT AT ALL

## 2024-03-07 ASSESSMENT — ASTHMA QUESTIONNAIRES
QUESTION_4 LAST FOUR WEEKS HOW OFTEN HAVE YOU USED YOUR RESCUE INHALER OR NEBULIZER MEDICATION (SUCH AS ALBUTEROL): ONCE A WEEK OR LESS
QUESTION_3 LAST FOUR WEEKS HOW OFTEN DID YOUR ASTHMA SYMPTOMS (WHEEZING, COUGHING, SHORTNESS OF BREATH, CHEST TIGHTNESS OR PAIN) WAKE YOU UP AT NIGHT OR EARLIER THAN USUAL IN THE MORNING: NOT AT ALL
QUESTION_2 LAST FOUR WEEKS HOW OFTEN HAVE YOU HAD SHORTNESS OF BREATH: ONCE OR TWICE A WEEK
QUESTION_5 LAST FOUR WEEKS HOW WOULD YOU RATE YOUR ASTHMA CONTROL: WELL CONTROLLED
ACT_TOTALSCORE: 22
QUESTION_1 LAST FOUR WEEKS HOW MUCH OF THE TIME DID YOUR ASTHMA KEEP YOU FROM GETTING AS MUCH DONE AT WORK, SCHOOL OR AT HOME: NONE OF THE TIME
ACT_TOTALSCORE: 22

## 2024-03-07 NOTE — PROGRESS NOTES
Assessment & Plan       ICD-10-CM    1. Uncontrolled hypertension  I10 lisinopril-hydrochlorothiazide (ZESTORETIC) 10-12.5 MG tablet     Adult Cardiology Eval  Referral      2. Tachycardia  R00.0 Adult Cardiology Eval  Referral      3. Dysuria  R30.0 UA with Microscopic - lab collect     Urine Culture Aerobic Bacterial - lab collect     UA with Microscopic - lab collect     Urine Culture Aerobic Bacterial - lab collect     Urine Microscopic Exam      4. Need for viral immunization  Z23 respiratory syncytial virus vaccine, bivalent (ABRYSVO) injection      5. Postsurgical hypoparathyroidism (H24)  E89.2       6. Postsurgical hypothyroidism  E89.0 TSH with free T4 reflex     TSH with free T4 reflex      7. Vitamin D deficiency  E55.9 Vitamin D Deficiency     Vitamin D Deficiency            Review of external notes as documented elsewhere in note      MED REC REQUIRED  Post Medication Reconciliation Status:       There are no Patient Instructions on file for this visit.    Chasity Oconnor is a 63 year old, presenting for the following health issues:  Hospital F/U      3/7/2024    10:05 AM   Additional Questions   Roomed by Nelly   Accompanied by none         3/7/2024    10:05 AM   Patient Reported Additional Medications   Patient reports taking the following new medications none     HPI       ED/UC Followup:    Facility:  [facility]  Date of visit: 12/28/2023  Reason for visit: Blood pressure and headache  Current Status: Still experiencing migraines       Elevated bp   Needs medication refills    Dysuria  Burning  frequency  Review of Systems  Constitutional, HEENT, cardiovascular, pulmonary, gi and gu systems are negative, except as otherwise noted.      Objective    BP (!) 135/97   Pulse (!) 128   Temp 97.8  F (36.6  C) (Temporal)   Wt 77.6 kg (171 lb)   LMP  (LMP Unknown)   SpO2 98%   BMI 28.46 kg/m    Body mass index is 28.46 kg/m .  Physical Exam  Vitals reviewed.    Constitutional:       General: She is not in acute distress.     Appearance: Normal appearance. She is well-developed.   HENT:      Head: Normocephalic and atraumatic.      Right Ear: External ear normal.      Left Ear: External ear normal.      Nose: Nose normal.   Eyes:      General: No scleral icterus.     Extraocular Movements: Extraocular movements intact.      Conjunctiva/sclera: Conjunctivae normal.   Cardiovascular:      Rate and Rhythm: Normal rate.   Pulmonary:      Effort: Pulmonary effort is normal.   Musculoskeletal:         General: No deformity. Normal range of motion.      Cervical back: Normal range of motion.   Skin:     General: Skin is warm and dry.      Findings: No rash.   Neurological:      Mental Status: She is alert and oriented to person, place, and time. Mental status is at baseline.      Gait: Gait normal.   Psychiatric:         Behavior: Behavior normal.         Thought Content: Thought content normal.         Judgment: Judgment normal.                    Signed Electronically by: Pablo Zarco MD    Answers submitted by the patient for this visit:  Patient Health Questionnaire (Submitted on 3/7/2024)  If you checked off any problems, how difficult have these problems made it for you to do your work, take care of things at home, or get along with other people?: Not difficult at all  PHQ9 TOTAL SCORE: 2  SENG-7 (Submitted on 3/7/2024)  SENG 7 TOTAL SCORE: 0

## 2024-03-07 NOTE — TELEPHONE ENCOUNTER
Received fax from pharmacy requesting refill(s) for     SUMATRIPTAN SUCCINATE 50 MG PO TABS  Last refilled on: Feb. 1, 2024         Patient last seen on: Feb. 27, 2024   Next appt scheduled for: Laura. 3, 2024     E-prescribe to:     [id number] - [place], [place] - [address]       Will facilitate refill.      Ruba Armstrong, Clinic Facilitator  [facility] [place]

## 2024-03-08 LAB
BACTERIA UR CULT: ABNORMAL
TSH SERPL DL<=0.005 MIU/L-ACNC: 0.74 UIU/ML (ref 0.3–4.2)
VIT D+METAB SERPL-MCNC: 43 NG/ML (ref 20–50)

## 2024-03-11 ENCOUNTER — NURSE TRIAGE (OUTPATIENT)
Dept: FAMILY MEDICINE | Facility: CLINIC | Age: 64
End: 2024-03-11

## 2024-03-11 DIAGNOSIS — N30.01 ACUTE CYSTITIS WITH HEMATURIA: Primary | ICD-10-CM

## 2024-03-11 DIAGNOSIS — S32.011D CLOSED STABLE BURST FRACTURE OF FIRST LUMBAR VERTEBRA WITH ROUTINE HEALING, SUBSEQUENT ENCOUNTER: ICD-10-CM

## 2024-03-11 RX ORDER — SULFAMETHOXAZOLE/TRIMETHOPRIM 800-160 MG
1 TABLET ORAL 2 TIMES DAILY
Qty: 20 TABLET | Refills: 0 | Status: SHIPPED | OUTPATIENT
Start: 2024-03-11 | End: 2024-03-21

## 2024-03-11 RX ORDER — SUMATRIPTAN 50 MG/1
TABLET, FILM COATED ORAL
Qty: 9 TABLET | Refills: 0 | Status: SHIPPED | OUTPATIENT
Start: 2024-03-11 | End: 2024-03-12

## 2024-03-11 NOTE — TELEPHONE ENCOUNTER
Provider Response to 2nd Level Triage Request    I have reviewed the RN documentation. My recommendation is:    Check pressure at home.  Symptoms may be due to UTI based on UA results.  Will order antibiotic.  Will order bactrim.  Multiple allergies on list, please let me know if patient says she's allergic to this.    Pablo Zarco MD

## 2024-03-11 NOTE — TELEPHONE ENCOUNTER
"Patient's  calling (no consent to communicate on file), states that patient was in to see PCP on 3/7/24 and began new medication Zestoretic.  notes that on 3/8/24 patient was very confused and disoriented, severe weakness, had multiple falls, balance concerns, nauseous that began and he feels it is related to the new medication. Patient has not been assessed after any of the above symptoms last week, notes that patient is still \"cognitively not the same\" but they did stop medication on 3/8/24. Per disposition 911 should be called and patient go to ED now,  states that he just wants to know what he should do from here. Writer again discussed that based off of symptoms the clinic is not an appropriate place for care as patient needs higher level of care now  states \"that's what they always say, never can do it in the clinic\".    Writer attempted to educate caller that ED is most appropriate place for patient to be seen currently due to very severe and serious symptoms (new confusion, weakness, balance concerns),  stated that is ridiculous and should be taken care of in the clinic. Writer asked if he would like a message sent to PCP regarding this but still recommended that patient be seen in ED now,  ended call.    Routing to PCP-  ended phone call and did not request message, due to above concerns writer still routing, advised ED and per conversation  did not convey that would be happening.    RADHA Haney RN  [facility] [place]    Reason for Disposition   Difficult to awaken or acting confused (disoriented, slurred speech) and new-onset    Additional Information   Negative: Difficult to awaken or acting confused (disoriented, slurred speech) and has diabetes mellitus    Answer Assessment - Initial Assessment Questions  1. LEVEL OF CONSCIOUSNESS: \"How is he (she, the patient) acting right now?\" (e.g., alert-oriented, confused, lethargic, " "stuporous, comatose)      -Alert, lethargic  2. ONSET: \"When did the confusion start?\"  (minutes, hours, days)      Thursday/Friday  3. PATTERN \"Does this come and go, or has it been constant since it started?\"  \"Is it present now?\"      -Same  4. ALCOHOL or DRUGS: \"Has he been drinking alcohol or taking any drugs?\"       -NA  5. NARCOTIC MEDICINES: \"Has he been receiving any narcotic medications?\" (e.g., morphine, Vicodin)      Yes, Oxycodone  6. CAUSE: \"What do you think is causing the confusion?\"       BP medication  7. OTHER SYMPTOMS: \"Are there any other symptoms?\" (e.g., difficulty breathing, headache, fever, weakness)      Weakness, confusion    Protocols used: Confusion - Delirium-A-OH    "

## 2024-03-11 NOTE — TELEPHONE ENCOUNTER
"Left message on answering machine for patient to call back to the nurse at [phone].    Please give PCP recommendation:      \"Check pressure at home.  Symptoms may be due to UTI based on UA results.  Will order antibiotic.  Will order bactrim.  Multiple allergies on list, please let me know if patient says she's allergic to this. \"    Evie Paul RN  [facility]    "

## 2024-03-12 ENCOUNTER — APPOINTMENT (OUTPATIENT)
Dept: CT IMAGING | Facility: HOSPITAL | Age: 64
End: 2024-03-12
Attending: EMERGENCY MEDICINE
Payer: COMMERCIAL

## 2024-03-12 ENCOUNTER — HOSPITAL ENCOUNTER (EMERGENCY)
Facility: HOSPITAL | Age: 64
Discharge: HOME | End: 2024-03-12
Attending: EMERGENCY MEDICINE | Admitting: EMERGENCY MEDICINE
Payer: COMMERCIAL

## 2024-03-12 ENCOUNTER — APPOINTMENT (OUTPATIENT)
Dept: RADIOLOGY | Facility: HOSPITAL | Age: 64
End: 2024-03-12
Attending: EMERGENCY MEDICINE
Payer: COMMERCIAL

## 2024-03-12 VITALS
BODY MASS INDEX: 28.46 KG/M2 | OXYGEN SATURATION: 100 % | TEMPERATURE: 97.4 F | RESPIRATION RATE: 18 BRPM | DIASTOLIC BLOOD PRESSURE: 80 MMHG | HEART RATE: 106 BPM | SYSTOLIC BLOOD PRESSURE: 151 MMHG | WEIGHT: 171 LBS

## 2024-03-12 DIAGNOSIS — E86.0 DEHYDRATION: ICD-10-CM

## 2024-03-12 DIAGNOSIS — S32.020A CLOSED COMPRESSION FRACTURE OF L2 LUMBAR VERTEBRA, INITIAL ENCOUNTER (H): ICD-10-CM

## 2024-03-12 DIAGNOSIS — S32.010A COMPRESSION FRACTURE OF L1 VERTEBRA, INITIAL ENCOUNTER (H): ICD-10-CM

## 2024-03-12 DIAGNOSIS — N39.0 URINARY TRACT INFECTION WITHOUT HEMATURIA, SITE UNSPECIFIED: ICD-10-CM

## 2024-03-12 DIAGNOSIS — E83.41 HYPERMAGNESEMIA: ICD-10-CM

## 2024-03-12 DIAGNOSIS — G43.519 INTRACTABLE PERSISTENT MIGRAINE AURA WITHOUT CEREBRAL INFARCTION AND WITHOUT STATUS MIGRAINOSUS: ICD-10-CM

## 2024-03-12 LAB
ALBUMIN SERPL BCG-MCNC: 3.8 G/DL (ref 3.5–5.2)
ALBUMIN UR-MCNC: 50 MG/DL
ALP SERPL-CCNC: 172 U/L (ref 40–150)
ALT SERPL W P-5'-P-CCNC: 28 U/L (ref 0–50)
ANION GAP SERPL CALCULATED.3IONS-SCNC: 13 MMOL/L (ref 7–15)
APPEARANCE UR: ABNORMAL
AST SERPL W P-5'-P-CCNC: ABNORMAL U/L
BACTERIA #/AREA URNS HPF: ABNORMAL /HPF
BASOPHILS # BLD AUTO: 0.1 10E3/UL (ref 0–0.2)
BASOPHILS NFR BLD AUTO: 1 %
BILIRUB DIRECT SERPL-MCNC: <0.2 MG/DL (ref 0–0.3)
BILIRUB SERPL-MCNC: 0.4 MG/DL
BILIRUB UR QL STRIP: NEGATIVE
BUN SERPL-MCNC: 53.3 MG/DL (ref 8–23)
CALCIUM SERPL-MCNC: 8.8 MG/DL (ref 8.8–10.2)
CHLORIDE SERPL-SCNC: 92 MMOL/L (ref 98–107)
COLOR UR AUTO: YELLOW
CREAT SERPL-MCNC: 1.47 MG/DL (ref 0.51–0.95)
DEPRECATED HCO3 PLAS-SCNC: 27 MMOL/L (ref 22–29)
EGFRCR SERPLBLD CKD-EPI 2021: 40 ML/MIN/1.73M2
EOSINOPHIL # BLD AUTO: 0.2 10E3/UL (ref 0–0.7)
EOSINOPHIL NFR BLD AUTO: 3 %
ERYTHROCYTE [DISTWIDTH] IN BLOOD BY AUTOMATED COUNT: 14.6 % (ref 10–15)
GLUCOSE SERPL-MCNC: 154 MG/DL (ref 70–99)
GLUCOSE UR STRIP-MCNC: NEGATIVE MG/DL
HCT VFR BLD AUTO: 39.5 % (ref 35–47)
HGB BLD-MCNC: 12.9 G/DL (ref 11.7–15.7)
HGB UR QL STRIP: ABNORMAL
IMM GRANULOCYTES # BLD: 0 10E3/UL
IMM GRANULOCYTES NFR BLD: 0 %
KETONES UR STRIP-MCNC: NEGATIVE MG/DL
LEUKOCYTE ESTERASE UR QL STRIP: ABNORMAL
LYMPHOCYTES # BLD AUTO: 1.5 10E3/UL (ref 0.8–5.3)
LYMPHOCYTES NFR BLD AUTO: 22 %
MAGNESIUM SERPL-MCNC: 3.4 MG/DL (ref 1.7–2.3)
MCH RBC QN AUTO: 27.7 PG (ref 26.5–33)
MCHC RBC AUTO-ENTMCNC: 32.7 G/DL (ref 31.5–36.5)
MCV RBC AUTO: 85 FL (ref 78–100)
MONOCYTES # BLD AUTO: 1 10E3/UL (ref 0–1.3)
MONOCYTES NFR BLD AUTO: 15 %
NEUTROPHILS # BLD AUTO: 3.9 10E3/UL (ref 1.6–8.3)
NEUTROPHILS NFR BLD AUTO: 59 %
NITRATE UR QL: POSITIVE
NRBC # BLD AUTO: 0 10E3/UL
NRBC BLD AUTO-RTO: 0 /100
PH UR STRIP: 6 [PH] (ref 5–7)
PLATELET # BLD AUTO: 514 10E3/UL (ref 150–450)
POTASSIUM SERPL-SCNC: 4.8 MMOL/L (ref 3.4–5.3)
PROT SERPL-MCNC: 7.6 G/DL (ref 6.4–8.3)
RBC # BLD AUTO: 4.66 10E6/UL (ref 3.8–5.2)
RBC URINE: 8 /HPF
SODIUM SERPL-SCNC: 132 MMOL/L (ref 135–145)
SP GR UR STRIP: 1.02 (ref 1–1.03)
SQUAMOUS EPITHELIAL: 7 /HPF
UROBILINOGEN UR STRIP-MCNC: <2 MG/DL
WBC # BLD AUTO: 6.7 10E3/UL (ref 4–11)
WBC CLUMPS #/AREA URNS HPF: PRESENT /HPF
WBC URINE: >182 /HPF

## 2024-03-12 PROCEDURE — 82247 BILIRUBIN TOTAL: CPT | Performed by: EMERGENCY MEDICINE

## 2024-03-12 PROCEDURE — 72125 CT NECK SPINE W/O DYE: CPT

## 2024-03-12 PROCEDURE — 70450 CT HEAD/BRAIN W/O DYE: CPT

## 2024-03-12 PROCEDURE — 99285 EMERGENCY DEPT VISIT HI MDM: CPT | Mod: 25

## 2024-03-12 PROCEDURE — 96365 THER/PROPH/DIAG IV INF INIT: CPT

## 2024-03-12 PROCEDURE — 84155 ASSAY OF PROTEIN SERUM: CPT | Performed by: EMERGENCY MEDICINE

## 2024-03-12 PROCEDURE — 72128 CT CHEST SPINE W/O DYE: CPT

## 2024-03-12 PROCEDURE — 36415 COLL VENOUS BLD VENIPUNCTURE: CPT | Performed by: EMERGENCY MEDICINE

## 2024-03-12 PROCEDURE — 72131 CT LUMBAR SPINE W/O DYE: CPT

## 2024-03-12 PROCEDURE — 83735 ASSAY OF MAGNESIUM: CPT | Performed by: EMERGENCY MEDICINE

## 2024-03-12 PROCEDURE — 81001 URINALYSIS AUTO W/SCOPE: CPT | Performed by: EMERGENCY MEDICINE

## 2024-03-12 PROCEDURE — 93005 ELECTROCARDIOGRAM TRACING: CPT | Performed by: EMERGENCY MEDICINE

## 2024-03-12 PROCEDURE — 80048 BASIC METABOLIC PNL TOTAL CA: CPT | Performed by: EMERGENCY MEDICINE

## 2024-03-12 PROCEDURE — 250N000011 HC RX IP 250 OP 636: Performed by: EMERGENCY MEDICINE

## 2024-03-12 PROCEDURE — 258N000003 HC RX IP 258 OP 636: Performed by: EMERGENCY MEDICINE

## 2024-03-12 PROCEDURE — 87086 URINE CULTURE/COLONY COUNT: CPT | Performed by: EMERGENCY MEDICINE

## 2024-03-12 PROCEDURE — 85025 COMPLETE CBC W/AUTO DIFF WBC: CPT | Performed by: EMERGENCY MEDICINE

## 2024-03-12 PROCEDURE — 87186 SC STD MICRODIL/AGAR DIL: CPT | Performed by: EMERGENCY MEDICINE

## 2024-03-12 PROCEDURE — 72100 X-RAY EXAM L-S SPINE 2/3 VWS: CPT

## 2024-03-12 RX ORDER — CEFDINIR 300 MG/1
300 CAPSULE ORAL 2 TIMES DAILY
Qty: 14 CAPSULE | Refills: 0 | Status: SHIPPED | OUTPATIENT
Start: 2024-03-12 | End: 2024-03-19

## 2024-03-12 RX ORDER — SUMATRIPTAN 50 MG/1
TABLET, FILM COATED ORAL
Qty: 15 TABLET | Refills: 0 | Status: SHIPPED | OUTPATIENT
Start: 2024-03-12 | End: 2024-04-24

## 2024-03-12 RX ORDER — CEFTRIAXONE 1 G/1
1 INJECTION, POWDER, FOR SOLUTION INTRAMUSCULAR; INTRAVENOUS ONCE
Status: COMPLETED | OUTPATIENT
Start: 2024-03-12 | End: 2024-03-12

## 2024-03-12 RX ADMIN — SODIUM CHLORIDE 1000 ML: 9 INJECTION, SOLUTION INTRAVENOUS at 18:42

## 2024-03-12 RX ADMIN — CEFTRIAXONE SODIUM 1 G: 1 INJECTION, POWDER, FOR SOLUTION INTRAMUSCULAR; INTRAVENOUS at 18:47

## 2024-03-12 ASSESSMENT — COLUMBIA-SUICIDE SEVERITY RATING SCALE - C-SSRS
6. HAVE YOU EVER DONE ANYTHING, STARTED TO DO ANYTHING, OR PREPARED TO DO ANYTHING TO END YOUR LIFE?: NO
1. IN THE PAST MONTH, HAVE YOU WISHED YOU WERE DEAD OR WISHED YOU COULD GO TO SLEEP AND NOT WAKE UP?: NO
2. HAVE YOU ACTUALLY HAD ANY THOUGHTS OF KILLING YOURSELF IN THE PAST MONTH?: NO

## 2024-03-12 NOTE — TELEPHONE ENCOUNTER
Pending Prescriptions:                       Disp   Refills    SUMAtriptan (IMITREX) 50 MG tablet        15 tab*0            Sig: Take 1 tablet by mouth as needed for migraine .           May repeat dose in 2 hours as needed. Maximum           dose 4 tablets in 24 hours   [facility]    Called [facility] to cancel order for #9 tablets

## 2024-03-12 NOTE — ED PROVIDER NOTES
Emergency Department Encounter     Evaluation Date & Time:   No admission date for patient encounter.    CHIEF COMPLAINT:  Medication Reaction      Triage Note:The pt presents for evaluation after a medication reaction on 3/7. She was started on lisinipril and 4 hours after her first dose she had severe gait issues and falls. She has back pain now after the fall. Her pain doctor told her to come here for labs.               ED COURSE & MEDICAL DECISION MAKING:     Pt here with  for evaluation of 4 days of symptoms that are described as not feeling well.  Pt reports this started an hour or so after taking first and only dose of lisinopril on 3/7/24 that was prescribed.  Pt felt weak, fell to ground.  Doesn't know if she hit head, but having stiffness in neck, thoracic back and low back.   reports some increased confusion at times as well, although here she is quite sharp and AOx4, no neuro deficits. Will get CT brain and spine imaging, labs, reassess.  Pt is on chronic pain meds, as well as ketamine.  Suspect this could be related, but warrants CT imaging, labs. She's afebrile, nontoxic appearing and neuro intact here.      ED Course as of 03/12/24 2127 Tue Mar 12, 2024   1554 Met with the patient and performed my initial exam.   1751 UA consistent with infection. Will give a dose of IV rocephin here.     1753 Chemistry shows mild hyponatremia at 132 and Cr increased to 1.4.  Mag elevated to 3.4, likely renal related and should improve with hydration.  Will make sure pt is not taking supplemental magnesium and have her stop if so.     1755 CT brain negative for acute pathology. CT Cspine negative.  Newer L1 burst fracture and L2 superior end plate compression fracture. Will consult with neurosurgery.  Pt neuro intact, ambulatory.  She has pain, but underlying chronic pain.   1804 Spoke with Roverto Fernandez, Neurosurgery [facility].  If we're discharging pt, she could follow up with them in 1 week or so,  since she's been up and walking for past 4 days.  Does not have to stay for bracing, but if she desires to stay in hospital, they can see in consultation for bracing.      Requested upright L spine xrays as well for follow up.  Order placed for this to be done in ED.   1822 Rechecked and updated patient on lab and imaging results.  Discussed all results, including dehydration, UTI, fractures with need for antibx, close outpatient follow up.  Rx for cefdinir sent.   2003 Pt feels much better, would like to go home.  Rx for cefdinir, continued hydration at home, return precautions understood.         Medical Decision Making    History:  Supplemental history from: Documented in chart and Family Member/Significant Other  External Record(s) reviewed: Documented in chart    Work Up:  Chart documentation includes differential considered and any EKGs or imaging independently interpreted by provider, where specified.  In additional to work up documented, I considered the following work up: Documented in chart, if applicable.    External consultation:  Discussion of management with another provider: Documented in chart, if applicable    Complicating factors:  Care impacted by chronic illness: Cancer/Chemotherapy, Chronic Kidney Disease, Chronic Pain, and Other: Thyroid gland disease  Care affected by social determinants of health: N/A    Disposition considerations: Discharge. I prescribed additional prescription strength medication(s) as charted. See documentation for any additional details.      At the conclusion of the encounter I discussed the results of all the tests and the disposition. The questions were answered. The patient or family acknowledged understanding and was agreeable with the care plan.      MEDICATIONS GIVEN IN THE EMERGENCY DEPARTMENT:  Medications   sodium chloride 0.9% BOLUS 1,000 mL (0 mLs Intravenous Stopped 3/12/24 1932)   cefTRIAXone (ROCEPHIN) 1 g vial to attach to  mL bag for ADULTS or NS 50  "mL bag for PEDS (0 g Intravenous Stopped 3/12/24 1927)       NEW PRESCRIPTIONS STARTED AT TODAY'S ED VISIT:  Discharge Medication List as of 3/12/2024  8:22 PM        START taking these medications    Details   cefdinir (OMNICEF) 300 MG capsule Take 1 capsule (300 mg) by mouth 2 times daily for 7 days, Disp-14 capsule, R-0, E-Prescribe      SUMAtriptan (IMITREX) 50 MG tablet Take 1 tablet by mouth as needed for migraine . May repeat dose in 2 hours as needed. Maximum dose 4 tablets in 24 hours, Disp-15 tablet, R-0, E-Prescribe             HPI   The history is provided by the patient and the spouse. No  was used.        Tisha Arias is a 63 year old female with a pertinent history of breast cancer, chronic pain disorder, thyroid gland disease, anxiety, and CKD, who presents to this ED via walk-in for evaluation of medication reaction.    Patient was started on lisinopril for HTN on 3/7 and reports after taking a dose, she slid off a chair and fell to her knees. She doesn't recall head injury or trauma. She was having issues with loss of balance and had another fall while standing and landed on her buttocks, heard a pop, reporting buttocks pain since fall. Fell again and bounced off the dresser the same day she took the new medication. Per spouse, she has been dizzy and has cognitive issues with memory, contacted Dr. Berry about ongoing symptoms since starting medications, and recommended work up for delirium. Stopped taking new medication on 3/7. Patient has been taking Ketamine with no new changes to dosage. Endorses new neck stiffness, lower back pain, pain between shoulder blades, feeling flushed, and denies new cough or new nausea.    Per spouse, reports \"extreme\" delirium and notes patient woke up 0530 this morning and stood outside \"waiting for someone\", but they weren't expecting anyone. Denies patient has a history of underlying medical health issues. No other reported " complaints or concerns at this time.      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 No real need for Epipen for this alone.    GDM (gestational diabetes mellitus)     Gestational hypertension     Lymphedema     Migraine     Neuropathy associated with cancer (H)     Papillary carcinoma, follicular variant (H) 2013    Post-surgical hypothyroidism     Renal disease     Rhinitis, allergic to other allergen     Right Breast mass and microcalcifications 2011    Seasonal allergic conjunctivitis     Seasonal allergic rhinitis 11 skin tests pos. for: dog/M/T/G/W--NEGATIVE FOOD TESTS FOR: shrimp, crab, lobster, coconut       Past Surgical History:   Procedure Laterality Date    BACK SURGERY  ,    Bulging disc w nerve root impigment    BIOPSY BREAST      right    BIOPSY BREAST  11    right, core and sterotactic    BONE MARROW BIOPSY, BONE SPECIMEN, NEEDLE/TROCAR Left 10/3/2017    Procedure: BIOPSY BONE MARROW;  Bone Marrow Biopsy with aspirate;  Surgeon: Amelia Watkins PA-C;  [facility]: [facility]    BREAST SURGERY      BYPASS GASTRIC, CHOLECYSTECTOMY, COMBINED       SECTION       SECTION      COLONOSCOPY      normal    COSMETIC SURGERY  2012    Final Stage of Mastectomy    COSMETIC SURGERY      DAVINCI HYSTERECTOMY TOTAL, BILATERAL SALPINGO-OOPHORECTOMY, COMBINED  2012    Procedure: COMBINED DAVINCI HYSTERECTOMY TOTAL, SALPINGO-OOPHORECTOMY;  Davinci Total Laparoscopic Hysterectomy, Bilateral Salpingo Oophorectomy, Pelvic Washings, Cystoscopy;  Surgeon: Evie Sheikh  MD Sasha;  Location: [facility] OR    ENT SURGERY  1982    ESOPHAGOSCOPY, GASTROSCOPY, DUODENOSCOPY (EGD), COMBINED N/A 9/14/2017    Procedure: COMBINED ENDOSCOPIC ULTRASOUND, ESOPHAGOSCOPY, GASTROSCOPY, DUODENOSCOPY (EGD), FINE NEEDLE ASPIRATE/BIOPSY;  Esophagogastroduodenoscopy, Endoscopic Ultrasound, with fine needle biopsy aspirate;  Surgeon: Patrick Robles MD;  Location: [facility]    EXTRACORPOREAL SHOCK WAVE LITHOTRIPSY, CYSTOSCOPY, INSERT STENT URETER(S), COMBINED Right 8/19/2019    Procedure: EXTRACORPORAL SHOCKWAVE LITHOTRIPSY,RIGHT URETERAL STENT PLACEMENT;  Surgeon: Pablo Graham MD;  Location: [facility]    GI SURGERY  11-    Rachael-en Y w cholecystectomy    GYN SURGERY  1/6/89,1/10/92    2 c-sections    HYSTERECTOMY      HYSTERECTOMY, PAP NO LONGER INDICATED  12/12    DeVinci assister lap hyst with BSO    INSERT PORT VASCULAR ACCESS Right 10/4/2017    Procedure: INSERT PORT VASCULAR ACCESS;  Port Placement;  Surgeon: Jc Goodman PA-C;  [facility]    IR PORT REMOVAL RIGHT  11/11/2019    IRRIGATION AND DEBRIDEMENT BREAST  3/1/2012    Procedure:IRRIGATION AND DEBRIDEMENT BREAST; Irrigation and Debridement, Wound Closure Right Breast; Surgeon:JUNG GUILLEN; Location:[facility]    MASTECTOMY, RECONSTRUCT BREAST, COMBINED  1/30/2012    Procedure:COMBINED MASTECTOMY, RECONSTRUCT BREAST; Bilateral Mastectomies, Right Axillary Corrigan Node Biopsy, Bilateral Breast Reconstruction with Tissue Expanders, Reconstruction of inframammary fold, bilateral pain management systems; Surgeon:ANUPAM CAMPBELL; Location:[facility]    RECONSTRUCT BREAST  8/31/2012    Procedure: RECONSTRUCT BREAST;  Bilateral 2nd stage breast reconstruction, revision,      REMOVE PORT VASCULAR ACCESS Right 11/11/2019    Procedure: Right Port Removal;  Surgeon: Ely Leslie PA-C;  [facility]    SOFT TISSUE SURGERY  1-30-12    Mastectomy w severe myofascial pain syndrome    THYROIDECTOMY  7/10/2013    Procedure:  THYROIDECTOMY;  Total Thyroidectomy with central neck dissection;  Surgeon: Indiana Sneed MD;  [facility]    THYROIDECTOMY      TONSILLECTOMY      childhood    TONSILLECTOMY      ZZC LAPAROSCOPIC GASTRIC RESTRICTIVE PX, W/GASTRIC BYPASS/ RACHAEL-EN-Y, < 150CM  2007    Rachael en Y?       Family History   Problem Relation Age of Onset    Allergies Mother     Arthritis Mother     Cancer Mother     Hypertension Mother     Hyperlipidemia Mother     Cerebrovascular Disease Mother         s/p brain surgery    Breast Cancer Mother     Depression Mother     Anxiety Disorder Mother     Anesthesia Reaction Mother     Asthma Mother     Skin Cancer Mother     Heart Disease Father     Diabetes Father     Coronary Artery Disease Father     Hypertension Father     Hyperlipidemia Father     Cerebrovascular Disease Father         Multiple strokes    Obesity Father     Diabetes Brother     Depression Brother     Hypertension Brother     Cancer Maternal Grandfather     Prostate Cancer Maternal Grandfather         Prostrate and Bladder    Other Cancer Maternal Grandfather         Pancreatic 1988, Bladder 1977    Cancer Maternal Grandmother     Breast Cancer Maternal Grandmother     Skin Cancer Maternal Grandmother     Cancer Brother 57        pancreatic cancer    Diabetes Brother     Breast Cancer Cousin         Grand mothers sister    Other Cancer Brother         Stage 3 Pancreatic 2-2015    Depression Brother     Asthma Brother     Obesity Brother     Anesthesia Reaction Other         H/a, itching, drug interactions    Asthma Other     Cancer Brother         Pancreatic     Thyroid Disease No family hx of     Cancer Brother     Depression Brother     Heart Disease Paternal Grandmother     Hypertension Paternal Grandmother     Coronary Artery Disease Paternal Grandmother     Diabetes Paternal Grandfather     Coronary Artery Disease Paternal Grandfather        Social History     Tobacco Use    Smoking status: Never    Smokeless  tobacco: Never    Tobacco comments:     no 2nd hand smoke exposure   Substance Use Topics    Alcohol use: No     Comment: rare    Drug use: No       cefdinir (OMNICEF) 300 MG capsule  ACAI RAMIREZ PO  acetaminophen (TYLENOL) 325 MG tablet  albuterol (VENTOLIN HFA) 108 (90 Base) MCG/ACT inhaler  calcitRIOL (ROCALTROL) 0.25 MCG capsule  calcium citrate-vitamin D (CALCIUM CITRATE + D3) 315-250 MG-UNIT TABS per tablet  celecoxib (CELEBREX) 200 MG capsule  cetirizine (ZYRTEC ALLERGY) 10 MG tablet  Cholecalciferol (VITAMIN D3) 50 MCG (2000 UT) CAPS  cromolyn (OPTICROM) 4 % ophthalmic solution  cromolyn sodium (NASALCROM) 5.2 MG/ACT nasal aerosol  cyclobenzaprine (FLEXERIL) 10 MG tablet  diclofenac (VOLTAREN) 1 % topical gel  famotidine (PEPCID) 20 MG tablet  fluconazole (DIFLUCAN) 150 MG tablet  fluticasone (FLONASE) 50 MCG/ACT nasal spray  hydrOXYzine HCl (ATARAX) 25 MG tablet  ketamine HCl POWD  ketamine HCl POWD  KLOR-CON 20 MEQ CR tablet  levothyroxine (SYNTHROID/LEVOTHROID) 112 MCG tablet  lidocaine (XYLOCAINE) 5 % external ointment  lidocaine-prilocaine (EMLA) cream  lisinopril-hydrochlorothiazide (ZESTORETIC) 10-12.5 MG tablet  magnesium oxide (MAG-OX) 400 MG tablet  Menaquinone-7 (VITAMIN K2) 100 MCG CAPS  Methylene Blue POWD  montelukast (SINGULAIR) 10 MG tablet  naloxegol (MOVANTIK) 25 MG TABS tablet  naloxone (NARCAN) nasal spray  olopatadine (PATANOL) 0.1 % ophthalmic solution  omeprazole (PRILOSEC) 10 MG DR capsule  ondansetron (ZOFRAN ODT) 8 MG ODT tab  oxyCODONE IR (ROXICODONE) 30 MG tablet  Probiotic Product (PROBIOTIC DAILY PO)  ranitidine (ZANTAC) 75 MG tablet  sulfamethoxazole-trimethoprim (BACTRIM DS) 800-160 MG tablet  SUMAtriptan (IMITREX) 50 MG tablet  tobramycin (TOBREX) 0.3 % ophthalmic solution  tobramycin-dexamethasone (TOBRADEX) 0.3-0.1 % ophthalmic suspension  triamcinolone (KENALOG) 0.025 % ointment  Triamcinolone Acetonide (AZMACORT IN)  UNABLE TO FIND  UNABLE TO FIND  UNABLE TO  FIND        Physical Exam     Vitals:  BP (!) 151/80   Pulse 106   Temp 97.4  F (36.3  C) (Temporal)   Resp 18   Wt 77.6 kg (171 lb)   LMP  (LMP Unknown)   SpO2 100%   BMI 28.46 kg/m      PHYSICAL EXAM:   Physical Exam  Vitals and nursing note reviewed.   Constitutional:       General: She is not in acute distress.     Appearance: Normal appearance.   HENT:      Head: Normocephalic and atraumatic.      Nose: Nose normal.      Mouth/Throat:      Mouth: Mucous membranes are moist.      Tongue: No lesions.      Comments: No tongue injury  Eyes:      Pupils: Pupils are equal, round, and reactive to light.   Cardiovascular:      Rate and Rhythm: Normal rate and regular rhythm.      Pulses: Normal pulses.           Radial pulses are 2+ on the right side and 2+ on the left side.        Dorsalis pedis pulses are 2+ on the right side and 2+ on the left side.   Pulmonary:      Effort: Pulmonary effort is normal. No respiratory distress.      Breath sounds: Normal breath sounds.   Abdominal:      Palpations: Abdomen is soft.      Tenderness: There is no abdominal tenderness.   Musculoskeletal:      Cervical back: Full passive range of motion without pain and neck supple. Tenderness (mild lower) present.      Thoracic back: Tenderness (mild upper) present.      Lumbar back: Tenderness (mild lower) present.      Comments: No palpable deformity   Skin:     General: Skin is warm.      Findings: No rash.   Neurological:      General: No focal deficit present.      Mental Status: She is alert and oriented to person, place, and time. Mental status is at baseline.      GCS: GCS eye subscore is 4. GCS verbal subscore is 5. GCS motor subscore is 6.      Cranial Nerves: Cranial nerves 2-12 are intact.      Sensory: Sensation is intact.      Motor: Motor function is intact.      Coordination: Coordination is intact.      Comments: Fluent speech, 5/5 strength b/l UE/LE, no acute lateralizing deficits   Psychiatric:         Mood and  Affect: Mood normal.         Behavior: Behavior normal.           Results     LAB:  All pertinent labs reviewed and interpreted  Labs Ordered and Resulted from Time of ED Arrival to Time of ED Departure   BASIC METABOLIC PANEL - Abnormal       Result Value    Sodium 132 (*)     Potassium 4.8      Chloride 92 (*)     Carbon Dioxide (CO2) 27      Anion Gap 13      Urea Nitrogen 53.3 (*)     Creatinine 1.47 (*)     GFR Estimate 40 (*)     Calcium 8.8      Glucose 154 (*)    MAGNESIUM - Abnormal    Magnesium 3.4 (*)    ROUTINE UA WITH MICROSCOPIC - Abnormal    Color Urine Yellow      Appearance Urine Cloudy (*)     Glucose Urine Negative      Bilirubin Urine Negative      Ketones Urine Negative      Specific Gravity Urine 1.020      Blood Urine 0.06 mg/dL (*)     pH Urine 6.0      Protein Albumin Urine 50 (*)     Urobilinogen Urine <2.0      Nitrite Urine Positive (*)     Leukocyte Esterase Urine 500 Shekhar/uL (*)     Bacteria Urine Many (*)     WBC Clumps Urine Present (*)     RBC Urine 8 (*)     WBC Urine >182 (*)     Squamous Epithelials Urine 7 (*)    CBC WITH PLATELETS AND DIFFERENTIAL - Abnormal    WBC Count 6.7      RBC Count 4.66      Hemoglobin 12.9      Hematocrit 39.5      MCV 85      MCH 27.7      MCHC 32.7      RDW 14.6      Platelet Count 514 (*)     % Neutrophils 59      % Lymphocytes 22      % Monocytes 15      % Eosinophils 3      % Basophils 1      % Immature Granulocytes 0      NRBCs per 100 WBC 0      Absolute Neutrophils 3.9      Absolute Lymphocytes 1.5      Absolute Monocytes 1.0      Absolute Eosinophils 0.2      Absolute Basophils 0.1      Absolute Immature Granulocytes 0.0      Absolute NRBCs 0.0     HEPATIC FUNCTION PANEL - Abnormal    Protein Total 7.6      Albumin 3.8      Bilirubin Total 0.4      Alkaline Phosphatase 172 (*)     AST        ALT 28      Bilirubin Direct <0.20     URINE CULTURE       RADIOLOGY:  XR Lumbar Spine 2/3 Views   Final Result   IMPRESSION:       1.  Similar appearance  of recent L1 burst fracture and L2 superior endplate fracture as seen on same-day CT imaging.   2.  Lumbar dextrocurvature. Grade 1 anterolisthesis of L4 on L5.   3.  Multilevel degenerative disc disease with disc height loss greatest and advanced at L5-S1. Multilevel facet arthropathy.   4.  Calcified atherosclerosis of the abdominal aorta.      CT Lumbar Spine w/o Contrast   Final Result   IMPRESSION:   CERVICAL SPINE CT:   1.  No acute cervical spine fracture.      THORACIC SPINE CT:   1.  Multilevel age indeterminate superior endplate compression fractures with mild height loss, new since 2021.   2.  No retropulsion, traumatic subluxation or canal compromise.      LUMBAR SPINE CT:   1.  Recent L1 burst fracture with mild height loss and minimal retropulsion.   2.  Recent L2 superior endplate compression fracture with mild height loss.   3.  No traumatic subluxation or high-grade canal stenosis.      CT Thoracic Spine w/o Contrast   Final Result   IMPRESSION:   CERVICAL SPINE CT:   1.  No acute cervical spine fracture.      THORACIC SPINE CT:   1.  Multilevel age indeterminate superior endplate compression fractures with mild height loss, new since 2021.   2.  No retropulsion, traumatic subluxation or canal compromise.      LUMBAR SPINE CT:   1.  Recent L1 burst fracture with mild height loss and minimal retropulsion.   2.  Recent L2 superior endplate compression fracture with mild height loss.   3.  No traumatic subluxation or high-grade canal stenosis.      CT Cervical Spine w/o Contrast   Final Result   IMPRESSION:   CERVICAL SPINE CT:   1.  No acute cervical spine fracture.      THORACIC SPINE CT:   1.  Multilevel age indeterminate superior endplate compression fractures with mild height loss, new since 2021.   2.  No retropulsion, traumatic subluxation or canal compromise.      LUMBAR SPINE CT:   1.  Recent L1 burst fracture with mild height loss and minimal retropulsion.   2.  Recent L2 superior endplate  compression fracture with mild height loss.   3.  No traumatic subluxation or high-grade canal stenosis.      CT Head w/o Contrast   Final Result   IMPRESSION:   1.  No acute intracranial process.                   ECG:  Sinus tach, rate 99, normal intervals, no acute ischemia    I have independently reviewed and interpreted the EKG(s) documented above     PROCEDURES:  Procedures:  none      FINAL IMPRESSION:    ICD-10-CM    1. Urinary tract infection without hematuria, site unspecified  N39.0       2. Dehydration  E86.0       3. Hypermagnesemia  E83.41       4. Compression fracture of L1 vertebra, initial encounter (H)  S32.010A       5. Closed compression fracture of L2 lumbar vertebra, initial encounter (H)  S32.020A           0 minutes of critical care time      I, Tomeka Bush, am serving as a scribe to document services personally performed by Dr. Avinash Broussard, based on my observations and the provider's statements to me. I, Avinash Broussard, DO attest that Tomeka Bush is acting in a scribe capacity, has observed my performance of the services and has documented them in accordance with my direction.      Avinash Broussard DO  Emergency Medicine  [facility] EMERGENCY DEPARTMENT  3/12/2024  4:18 PM         Avinash Broussard MD  03/12/24 2125

## 2024-03-12 NOTE — DISCHARGE INSTRUCTIONS
Take antibiotic cefdinir as directed until gone with next dose tomorrow morning. Follow up closely with primary clinic for re-evaluation of all of this.  Neurosurgery will contact you regarding your spine fractures for follow up and further cares.

## 2024-03-12 NOTE — TELEPHONE ENCOUNTER
[facility] Call Center    Phone Message    May a detailed message be left on voicemail: yes     Reason for Call: Medication Question or concern regarding medication   Prescription Clarification  Name of Medication: SUMAtriptan (IMITREX) 50 MG tablet   Prescribing Provider: Jamal   Pharmacy:    [id number] - [place], [place] - [address]     What on the order needs clarification? Typically patient gets 15 tablets that lasts 30 days, and the pharmacy is wondering how long the 9 tablets are supposed to last. Please call and advise.      Action Taken: Message routed to:  Other: Pain    Travel Screening: Not Applicable

## 2024-03-12 NOTE — ED TRIAGE NOTES
The pt presents for evaluation after a medication reaction on 3/7. She was started on lisinipril and 4 hours after her first dose she had severe gait issues and falls. She has back pain now after the fall. Her pain doctor told her to come here for labs.

## 2024-03-13 LAB — BACTERIA UR CULT: ABNORMAL

## 2024-03-13 NOTE — TELEPHONE ENCOUNTER
"Spoke with patient and relayed provider's message. RN noted patient was in the ED yesterday, and noted that she was prescribed for Cefdinir.    Patient became frustrated explaining she did not receive a call back from care team, causing her to be seen in the ED. Writer empathized with patient and apologized if she did not receive the call from clinic, however clarified that we attempted to reach her at her number on 3/11 prior to ED visit occurrence and Rx of antibiotics were sent.     Patient explains that she may have received call, however, her  may have checked phone and was \"too angry\" to return call to clinic. Patient also noting she went to [facility] and they had informed her that they did not receive Rx. Patient expressing that she believes she keeps getting the run-around from the clinic, and demanding to speak with management. Offered patient patient relations line, patient is requesting a [employer] message to be sent to her with phone number.     Advised patient to book ED Follow-up visit with provider, patient declining appointment and requesting PCP to call her by the end of today without appointment. Patient stating that PCP has told her to ask about this in the past, and appointment is not required.    Called pharmacy and they advised patient had picked up her Sumatriptan and Bactrim today.    [employer] message sent with information for patient relations and information regarding prescriptions.    Routing to provider to please advise regarding patient's request for phone call from PCP without appointment.    GREGORIO RadfordN RN  [facility]  "

## 2024-03-14 ENCOUNTER — TELEPHONE (OUTPATIENT)
Dept: EMERGENCY MEDICINE | Facility: HOSPITAL | Age: 64
End: 2024-03-14
Payer: COMMERCIAL

## 2024-03-14 ENCOUNTER — TELEPHONE (OUTPATIENT)
Dept: FAMILY MEDICINE | Facility: CLINIC | Age: 64
End: 2024-03-14

## 2024-03-14 RX ORDER — LIDOCAINE 50 MG/G
1 PATCH TOPICAL EVERY 24 HOURS
Qty: 30 PATCH | Refills: 2 | Status: SHIPPED | OUTPATIENT
Start: 2024-03-14

## 2024-03-14 RX ORDER — OXYCODONE AND ACETAMINOPHEN 5; 325 MG/1; MG/1
1 TABLET ORAL EVERY 6 HOURS PRN
Qty: 16 TABLET | Refills: 0 | Status: SHIPPED | OUTPATIENT
Start: 2024-03-14 | End: 2024-03-18

## 2024-03-14 NOTE — TELEPHONE ENCOUNTER
[facility]    Reason for call: Lab Result Notification     Lab Result (including Rx patient on, if applicable).  If culture, copy of lab report at bottom.  Lab Result: Final Urine Culture Report on 3/13/24  [facility] Emergency Dept discharge antibiotic prescribed: Cefdinir (Omnicef) 300 mg capsule, 1 capsule (300 mg) by mouth 2 times daily for 7 days AND Sulfamethoxazole-Trimethoprim (Bactrim DS, Septra DS) 800-160 mg PO tablet,  1 tablet by mouth 2 times daily for 10 days.  #1. Bacteria, 50,000 - 100,000 CFU/ML Klebsiella pneumoniae is SUSCEPTIBLE to Antibiotic.    No change in treatment per [facility] ED lab result Urine Culture protocol.    Creatinine Level (mg/dl)   Creatinine   Date Value Ref Range Status   03/12/2024 1.47 (H) 0.51 - 0.95 mg/dL Final   11/11/2020 0.94 0.52 - 1.04 mg/dL Final    Creatinine clearance (ml/min), if applicable    Creatinine clearance cannot be calculated (Unknown ideal weight.)     Patient's current Symptoms:   Pt states symptoms are improving, notes she is only taking Bactrim, which does show to be a good treatment for bacteria. Relayed result, advised to continue ABX and follow up as needed. Answered questions regarding clinic result from 3/7    RN Recommendations/Instructions per [place] ED lab result protocol:   [facility] ED lab result protocol utilized: Urine Cx    Patient/care giver notified to contact your PCP clinic or return to the Emergency department if your:  Symptoms return.  Symptoms do not improve after 3 days on antibiotic.  Symptoms do not resolve after completing antibiotic.  Symptoms worsen or other concerning symptoms.    Reji Barros RN

## 2024-03-14 NOTE — TELEPHONE ENCOUNTER
Provider Response to 2nd Level Triage Request    I have reviewed the RN documentation.   Discussed issue with patient over the phone.  Additional pain control, referral to neurosurgery ordered.    Pablo Zarco MD

## 2024-03-14 NOTE — TELEPHONE ENCOUNTER
[facility] Call Center    Phone Message    May a detailed message be left on voicemail: yes     Reason for Call: Other: Patient calling stating Dr. Soto does know that she is under a pain contract with Dr. Berry.  She is needing the Percocet  filled today by Dr. Soto.  She is needing someone to call over to [facility] and say it is okay.       Action Taken: Message routed to:  Other: [facility]    Travel Screening: Not Applicable            Message left with the pharmacy that it is okay for patient to have the Percocet filled from the urgency room

## 2024-03-14 NOTE — TELEPHONE ENCOUNTER
Received call from Marlys with [facility].    She is calling to ensure Dr. Zarco is aware, patient was prescribed Oxycodone 30mg tab by alternate provider (Dr. Norton) and picked #14 total supply on both on 3/6 and 3/8.     Before dispensing the oxyCODONE-acetaminophen (PERCOCET) 5-325 MG tablet  sent 03/14/24 she wanted to ensure provider aware of this.    GREGORIO LewisN CASSIDY  [facility], [facility]

## 2024-03-15 ENCOUNTER — TELEPHONE (OUTPATIENT)
Dept: FAMILY MEDICINE | Facility: CLINIC | Age: 64
End: 2024-03-15
Payer: COMMERCIAL

## 2024-03-15 DIAGNOSIS — S32.011D CLOSED STABLE BURST FRACTURE OF FIRST LUMBAR VERTEBRA WITH ROUTINE HEALING, SUBSEQUENT ENCOUNTER: ICD-10-CM

## 2024-03-15 LAB
ATRIAL RATE - MUSE: 99 BPM
DIASTOLIC BLOOD PRESSURE - MUSE: NORMAL MMHG
INTERPRETATION ECG - MUSE: NORMAL
P AXIS - MUSE: 65 DEGREES
PR INTERVAL - MUSE: 128 MS
QRS DURATION - MUSE: 102 MS
QT - MUSE: 376 MS
QTC - MUSE: 482 MS
R AXIS - MUSE: -8 DEGREES
SYSTOLIC BLOOD PRESSURE - MUSE: NORMAL MMHG
T AXIS - MUSE: 63 DEGREES
VENTRICULAR RATE- MUSE: 99 BPM

## 2024-03-15 NOTE — TELEPHONE ENCOUNTER
Call placed to pharmacy. Pharmacy will check with Dr. Soto's office to advise if okay to fill percocet.

## 2024-03-15 NOTE — TELEPHONE ENCOUNTER
New Medication Request        What medication are you requesting?: Percocet 5-10mg     Reason for medication request: Already discussed with PCP    Have you taken this medication before?: Yes: Unknown. Pt needs provider to call pharmacy to okay prescription.     Controlled Substance Agreement on file:   CSA -- Patient Level:     [Media Unavailable] Controlled Substance Agreement - Opioid - Scan on 8/18/2023  3:13 PM   [Media Unavailable] Controlled Substance Agreement - Opioid - Scan on 10/24/2019: OUTSIDE RECORD         Patient offered an appointment? No    Preferred Pharmacy:   [id number] - [place] - [address]  [address] [address] [address]  Phone: [phone] Fax: [phone]    [id number] [place] - [address]  [address] [address]  Phone: [phone] Fax: [phone]      Could we send this information to you in [facility] or would you prefer to receive a phone call?:   Patient would prefer a phone call   Okay to leave a detailed message?: Yes at Cell number on file:    Telephone Information:   Mobile [phone]

## 2024-03-15 NOTE — TELEPHONE ENCOUNTER
Xin with [facility] pharmacy calling.  Patient getting oxycodone regularly from pain clinic.  Pharmacy wanted to ensure that Pablo Moulton is aware of this and needs verbal approval that it is ok for them to proceed with the percocet prescription    Lucia Pinon RN  [facility]

## 2024-03-16 ENCOUNTER — NURSE TRIAGE (OUTPATIENT)
Dept: NURSING | Facility: CLINIC | Age: 64
End: 2024-03-16
Payer: COMMERCIAL

## 2024-03-18 NOTE — TELEPHONE ENCOUNTER
Pt calling state that she is still waiting for pcp approval of taking oxycodone as pt usually see's pain clinic for this.     Aissatou Ferrer RN on 3/18/2024 at 3:27 PM

## 2024-03-18 NOTE — TELEPHONE ENCOUNTER
Pt calling for pcp's OK to be filled.     Pt requesting a call back.     Aissatou Ferrer RN on 3/18/2024 at 5:25 PM

## 2024-03-18 NOTE — TELEPHONE ENCOUNTER
Dr. Zarco, please confirm that it is okay to dispense to patient; pharmacy needs verbal approval that it is ok for them to proceed with the percocet prescription.     GREGORIO LewisN CASSIDY  [facility], [facility]

## 2024-03-18 NOTE — TELEPHONE ENCOUNTER
[facility] calling back regarding request.    Patient getting oxycodone regularly from pain clinic. Pharmacy wanted to ensure that PCP is aware of this and needs verbal approval that it is ok for them to proceed with the percocet prescription.    Routing to covering provider pool as PCP not in clinic currently. Please advise.    Pipe Chan, GREGORION RN  [facility]

## 2024-03-18 NOTE — TELEPHONE ENCOUNTER
Additional pain medication prescribed for new lumbar fracture.  Patient's pain clinic would not prescribe additional pain medication for new acute injury.    Pablo Zarco MD

## 2024-03-18 NOTE — TELEPHONE ENCOUNTER
I am unable to approve this. Will need to wait for PCP. Unclear why more oxycodone was prescribed.   Tiny López PA-C

## 2024-03-26 NOTE — CONFIDENTIAL NOTE
NEUROSURGERY- NEW PREVISIT PLANNING       Record Status/Location     Referring Provider Referral Pablo Soto MD    Diagnosis Referral S32.011D (ICD-10-CM) - Closed stable burst fracture of first lumbar vertebra with routine healing, subsequent encounter    MRI (HEAD, NECK, SPINE) Na    CT Pacs Lumbar 3/12/24 [facility]    X-ray Pacs Lumbar 3/12/24 [facility]    INJECTION Procedures 6/2/11- lumbar MELINDA   PHYSICAL THERAPY Na    SURGERY Na

## 2024-04-02 ENCOUNTER — PRE VISIT (OUTPATIENT)
Dept: NEUROSURGERY | Facility: CLINIC | Age: 64
End: 2024-04-02

## 2024-04-02 ENCOUNTER — OFFICE VISIT (OUTPATIENT)
Dept: NEUROSURGERY | Facility: CLINIC | Age: 64
End: 2024-04-02
Attending: FAMILY MEDICINE
Payer: COMMERCIAL

## 2024-04-02 ENCOUNTER — TELEPHONE (OUTPATIENT)
Dept: NEUROSURGERY | Facility: CLINIC | Age: 64
End: 2024-04-02

## 2024-04-02 VITALS
HEART RATE: 111 BPM | HEIGHT: 65 IN | SYSTOLIC BLOOD PRESSURE: 162 MMHG | DIASTOLIC BLOOD PRESSURE: 116 MMHG | BODY MASS INDEX: 30.82 KG/M2 | WEIGHT: 185 LBS

## 2024-04-02 DIAGNOSIS — S22.000A COMPRESSION FRACTURE OF THORACIC VERTEBRA, UNSPECIFIED THORACIC VERTEBRAL LEVEL, INITIAL ENCOUNTER (H): ICD-10-CM

## 2024-04-02 DIAGNOSIS — S32.020A COMPRESSION FRACTURE OF L2 VERTEBRA, INITIAL ENCOUNTER (H): ICD-10-CM

## 2024-04-02 DIAGNOSIS — S32.010A COMPRESSION FRACTURE OF L1 VERTEBRA, INITIAL ENCOUNTER (H): Primary | ICD-10-CM

## 2024-04-02 PROCEDURE — 99244 OFF/OP CNSLTJ NEW/EST MOD 40: CPT | Performed by: NURSE PRACTITIONER

## 2024-04-02 RX ORDER — DIAZEPAM 5 MG
5 TABLET ORAL
Qty: 2 TABLET | Refills: 0 | Status: SHIPPED | OUTPATIENT
Start: 2024-04-02

## 2024-04-02 ASSESSMENT — PAIN SCALES - GENERAL: PAINLEVEL: EXTREME PAIN (9)

## 2024-04-02 NOTE — LETTER
4/2/2024         RE: Tisha Arias  [address] [place] [address]        Dear Colleague,    Thank you for referring your patient, Tisha Arias, to the [facility]. Please see a copy of my visit note below.    [facility] Visit      CC: Back pain    Primary Care Provider: Pablo Soto    Reason For Visit:   I was asked by Dr. Soto to see this patient in consultation.       HPI: Tisha Arias is a 63 year old female with history of breast cancer, thyroid cancer, and lymphoma.  She presents for evaluation of back pain.  Patient reports mid thoracic back pain developed in December 2023.  No trauma or injuries at onset.  She describes aching mid thoracic back pain that radiates around to the chest.  Patient also reports low back pain developed after a fall on 3/12/2024.  Patient reports the fall was likely due to her blood pressure medication.  She went to the ED and imaging was completed.  Thoracic spine CT revealed superior endplate deformities with mild height loss at T1, T2, T3, T4, T5, and T12.  Lumbar spine CT revealed recent L1 burst fracture with mild height loss and recent L2 superior endplate compression fracture with mild height loss.  Patient was advised to follow-up with our clinic.  Today, patient reports continued aching mid thoracic back pain that radiates around to the chest, aching low back pain, and chronic pain in bilateral hips.  Symptoms worsen with activity and lifting. Denies any numbness, weakness, saddle anesthesia, or bowel/bladder incontinence.  Patient follows with [facility] for chronic pain medications including oxycodone and flexeril.     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 No real need for Epipen for this alone.     GDM (gestational diabetes mellitus)     w first pregnancy only     Gestational hypertension      Lymphedema     jackson arms, chest     Migraine      Neuropathy associated with cancer (H)      Papillary carcinoma, follicular variant (H) 2013    pT3, N1, Mx, thyroid     Post-surgical hypothyroidism      Renal disease     kidney stones     Rhinitis, allergic to other allergen      Right Breast mass and microcalcifications 2011     Seasonal allergic conjunctivitis      Seasonal allergic rhinitis 11 skin tests pos. for: dog/M/T/G/W--NEGATIVE FOOD TESTS FOR: shrimp, crab, lobster, coconut       Past Medical History reviewed with patient during visit.    Past Surgical History:   Procedure Laterality Date     BACK SURGERY  ,    Bulging disc w nerve root impigment     BIOPSY BREAST      right     BIOPSY BREAST  11    right, core and sterotactic     BONE MARROW BIOPSY, BONE SPECIMEN, NEEDLE/TROCAR Left 10/3/2017    Procedure: BIOPSY BONE MARROW;  Bone Marrow Biopsy with aspirate;  Surgeon: Amelia Watkins PA-C;  [facility]: [facility]     BREAST SURGERY       BYPASS GASTRIC, CHOLECYSTECTOMY, COMBINED        SECTION        SECTION       COLONOSCOPY      normal     COSMETIC SURGERY  2012    Final Stage of Mastectomy     COSMETIC SURGERY       DAVINCI HYSTERECTOMY TOTAL, BILATERAL SALPINGO-OOPHORECTOMY, COMBINED  2012    Procedure: COMBINED DAVINCI HYSTERECTOMY TOTAL, SALPINGO-OOPHORECTOMY;  Davinci Total Laparoscopic Hysterectomy, Bilateral Salpingo Oophorectomy, Pelvic Washings, Cystoscopy;  Surgeon: Evie Sheikh MD;  Location: [facility] OR     ENT SURGERY       ESOPHAGOSCOPY, GASTROSCOPY,  DUODENOSCOPY (EGD), COMBINED N/A 9/14/2017    Procedure: COMBINED ENDOSCOPIC ULTRASOUND, ESOPHAGOSCOPY, GASTROSCOPY, DUODENOSCOPY (EGD), FINE NEEDLE ASPIRATE/BIOPSY;  Esophagogastroduodenoscopy, Endoscopic Ultrasound, with fine needle biopsy aspirate;  Surgeon: Patrick Robles MD;  Location: [facility] OR     EXTRACORPOREAL SHOCK WAVE LITHOTRIPSY, CYSTOSCOPY, INSERT STENT URETER(S), COMBINED Right 8/19/2019    Procedure: EXTRACORPORAL SHOCKWAVE LITHOTRIPSY,RIGHT URETERAL STENT PLACEMENT;  Surgeon: Pablo Graham MD;  Location: [facility] OR     GI SURGERY  11-    Rachael-en Y w cholecystectomy     GYN SURGERY  1/6/89,1/10/92    2 c-sections     HYSTERECTOMY       HYSTERECTOMY, PAP NO LONGER INDICATED  12/12    DeVinci assister lap hyst with BSO     INSERT PORT VASCULAR ACCESS Right 10/4/2017    Procedure: INSERT PORT VASCULAR ACCESS;  Port Placement;  Surgeon: Jc Goodman PA-C;  [facility]     IR PORT REMOVAL RIGHT  11/11/2019     IRRIGATION AND DEBRIDEMENT BREAST  3/1/2012    Procedure:IRRIGATION AND DEBRIDEMENT BREAST; Irrigation and Debridement, Wound Closure Right Breast; Surgeon:JUNG GUILLEN; Location:[facility] OR     MASTECTOMY, RECONSTRUCT BREAST, COMBINED  1/30/2012    Procedure:COMBINED MASTECTOMY, RECONSTRUCT BREAST; Bilateral Mastectomies, Right Axillary Powell Node Biopsy, Bilateral Breast Reconstruction with Tissue Expanders, Reconstruction of inframammary fold, bilateral pain management systems; Surgeon:ANUPAM CAMPBELL; Location:[facility] OR     RECONSTRUCT BREAST  8/31/2012    Procedure: RECONSTRUCT BREAST;  Bilateral 2nd stage breast reconstruction, revision,       REMOVE PORT VASCULAR ACCESS Right 11/11/2019    Procedure: Right Port Removal;  Surgeon: Ely Leslie PA-C;  [facility]: [facility]     SOFT TISSUE SURGERY  1-30-12    Mastectomy w severe myofascial pain syndrome     THYROIDECTOMY  7/10/2013    Procedure: THYROIDECTOMY;  Total Thyroidectomy with central neck dissection;  Surgeon:  Indiana Sneed MD;  Location: [facility] OR     THYROIDECTOMY       TONSILLECTOMY      childhood     TONSILLECTOMY       ZZC LAPAROSCOPIC GASTRIC RESTRICTIVE PX, W/GASTRIC BYPASS/ RACHAEL-EN-Y, < 150CM  2007    Rachael en Y?     Past Surgical History reviewed with patient during visit.    Current Outpatient Medications   Medication Sig Dispense Refill     ACAI RAMIREZ PO Take 50 mg by mouth       acetaminophen (TYLENOL) 325 MG tablet Take 2 tablets (650 mg) by mouth every 4 hours as needed for mild pain or fever 100 tablet      albuterol (VENTOLIN HFA) 108 (90 Base) MCG/ACT inhaler Inhale 1-2 puffs into the lungs every 4 hours as needed for shortness of breath or wheezing 18 g 3     calcitRIOL (ROCALTROL) 0.25 MCG capsule TAKE TWO CAPSULES BY MOUTH IN THE MORNING AND TAKE ONE CAPSULE BY MOUTH IN THE EVENING. 270 capsule 4     calcium citrate-vitamin D (CALCIUM CITRATE + D3) 315-250 MG-UNIT TABS per tablet Take 2 tablets by mouth 2 times daily 60 tablet 2     celecoxib (CELEBREX) 200 MG capsule Take 1 capsule (200 mg) by mouth TWO times daily 60 capsule 3     cetirizine (ZYRTEC ALLERGY) 10 MG tablet Take 1 tablet (10 mg) by mouth 3 times daily On hold for lab test. 90 tablet 0     Cholecalciferol (VITAMIN D3) 50 MCG (2000 UT) CAPS Take 2,000 Units by mouth daily 90 capsule 1     cromolyn (OPTICROM) 4 % ophthalmic solution Place 1 drop into both eyes 4 times daily 10 mL 0     cromolyn sodium (NASALCROM) 5.2 MG/ACT nasal aerosol SPRAY ONE SPRAY( 1 ML) IN NOSTRIL DAILY 1 mL 3     cyclobenzaprine (FLEXERIL) 10 MG tablet Take 1 tablet (10 mg) by mouth 4 times daily This is a 90 day supply 360 tablet 1     diclofenac (VOLTAREN) 1 % topical gel Apply affected area two times daily as needed using enclosed dosing card. (2-4 grams to chest wall) 100 g 1     famotidine (PEPCID) 20 MG tablet Take 1 tablet (20 mg) by mouth 2 times daily This is a 90 day supply 180 tablet 1     fluconazole (DIFLUCAN) 150 MG tablet Take 1 tablet (150 mg) by  mouth once as needed (yeast infection symptoms) Repeat in 5 days if still having symptoms. 2 tablet 0     fluticasone (FLONASE) 50 MCG/ACT nasal spray Spray 1-2 sprays into both nostrils daily 16 g 2     hydrOXYzine HCl (ATARAX) 25 MG tablet One tab 3-4 times a day to augment oxycodone 270 tablet 3     ketamine HCl POWD 120 mg lozenge one-half to one lozenge up to 4 times a day as needed, #120 120 g 3     ketamine HCl POWD Ketamine 10% and lidocaine 10% in topical cream, apply to feet tid , 90 gm tube 90 g 3     KLOR-CON 20 MEQ CR tablet TAKE ONE TABLET BY MOUTH TWICE DAILY 60 tablet 0     levothyroxine (SYNTHROID/LEVOTHROID) 112 MCG tablet TAKE TWO TABLETS BY MOUTH DAILY 180 tablet 0     lidocaine (LIDODERM) 5 % patch Place 1 patch onto the skin every 24 hours To prevent lidocaine toxicity, patient should be patch free for 12 hrs daily. 30 patch 2     lidocaine (XYLOCAINE) 5 % external ointment Apply quarter size amount to chest and back up to 3 times daily as needed for pain. 350 g 1     lidocaine-prilocaine (EMLA) cream Apply topically as needed for moderate pain 60 g 1     lisinopril-hydrochlorothiazide (ZESTORETIC) 10-12.5 MG tablet Take 1 tablet by mouth daily 90 tablet 1     magnesium oxide (MAG-OX) 400 MG tablet Take 1 tablet (400 mg) by mouth 3 times daily 90 tablet 4     Menaquinone-7 (VITAMIN K2) 100 MCG CAPS Take 1 capsule by mouth 2 times daily 60 capsule 3     Methylene Blue POWD 10 mg capsule, one morning for 5 days, off 2 days, one morning and noon 5 days on, 2 days off, then two morning and one noon,  5 days on 2 days off, #90 90 g 2     montelukast (SINGULAIR) 10 MG tablet Take 2 tablets (20 mg) by mouth 2 times daily TAKE TWO TABLETS BY MOUTH TWICE DAILY Strength: 10 mg 120 tablet 11     naloxegol (MOVANTIK) 25 MG TABS tablet Take 1 tablet (25 mg) by mouth every morning (before breakfast) 30 tablet 3     naloxone (NARCAN) nasal spray Spray 1 spray (4 mg) into one nostril alternating nostrils as  needed for opioid reversal every 2-3 minutes until assistance arrives 0.2 mL 0     olopatadine (PATANOL) 0.1 % ophthalmic solution INSTILL ONE DROP INTO EACH EYE TWICE DAILY 5 mL 0     omeprazole (PRILOSEC) 10 MG DR capsule Take 2 capsules (20 mg) by mouth daily 30 capsule 1     ondansetron (ZOFRAN ODT) 8 MG ODT tab DISSOLVE ONE TABLET IN MOUTH EVERY EIGHT HOURS AS NEEDED FOR NAUSEA Strength: 8 mg 30 tablet 1     oxyCODONE IR (ROXICODONE) 30 MG tablet Take 1 tablet (30 mg) by mouth 6 times daily Fill 4/2/24 for 4/3/24 (14 day RX) 84 tablet 0     Probiotic Product (PROBIOTIC DAILY PO) Take 1 capsule by mouth daily Lacto acid bifidobacterium       ranitidine (ZANTAC) 75 MG tablet Take 1 tablet (75 mg) by mouth 3 times daily 90 tablet 3     SUMAtriptan (IMITREX) 50 MG tablet Take 1 tablet by mouth as needed for migraine . May repeat dose in 2 hours as needed. Maximum dose 4 tablets in 24 hours 15 tablet 0     tobramycin (TOBREX) 0.3 % ophthalmic solution ADMINISTER 1 2 DROPS TO THE RIGHT EYE EVERY 4 (FOUR) HOURS FOR 7 DAYS.       tobramycin-dexamethasone (TOBRADEX) 0.3-0.1 % ophthalmic suspension INSTILL ONE DROP INTO BOTH EYES THREE TIMES DAILY       triamcinolone (KENALOG) 0.025 % ointment Apply topically as needed  3     Triamcinolone Acetonide (AZMACORT IN) Inhale 2 puffs into the lungs as needed       UNABLE TO FIND Take 2 capsules by mouth 3 times daily Muscle Mag. 2 caps contain B1 20mg, B2 20mg, B6 10mg, magesium 20mg, manganese 2mg.       UNABLE TO FIND 3 tablets 3 times daily MEDICATION NAME: calcium D-Glucarate   3 caps contain 180mg of elemental calcium.       UNABLE TO FIND 1 tablet daily MEDICATION NAME: Pure Encapsulations       No current facility-administered medications for this visit.       Allergies   Allergen Reactions     Baclofen Other (See Comments) and Unknown     Other reaction(s): Edema, chest pain, seizures.   Other reaction(s): Chest Pain, Edema, Headache  Seizures     Buprenorphine       "Other reaction(s): Chest Pain  Difficulty swallowing, GI cramping     Clonidine Other (See Comments)     \"Seizures\"     Duloxetine Anaphylaxis and Other (See Comments)     Flushing, tremor/muscle twitching and edema  Serotonin syndrome  Other reaction(s): Ataxia  Flushing, tremor/muscle twitching and edema  Serotonin syndrome  SOB palpitations itching rash     Methocarbamol Other (See Comments)     Swelling and chest pain  Other reaction(s): Angioedema  Swelling in chest     No Clinical Screening - See Comments Shortness Of Breath, Palpitations, Anaphylaxis, Itching, Swelling, Difficulty breathing and Rash     Skuhjinder wipes- oral allergy -  July 2015: throat tightness from a Chinese herbal medicine Wilmer Tran  Other reaction(s): Edema, Tongue Swelling  Sukhjinder wipes  Has anaphylactic reactions to varied environmental things.  Carries an epi-pen  Oral allergy syndrome enviromental- food intolerances and animal dander birch trees potatoes carrots cherries celery apple pears plums peaches parsnip kiwi hazelnuts apricots     Carries epi pen     Nuts Shortness Of Breath     Other reaction(s): Throat Swelling/Closing  Please check herbal formulas for these allergens prior to prescribing.     Oxycontin [Oxycodone] Nausea and Vomiting     Serotonin Anxiety, Other (See Comments) and Swelling     Seizures    Anxiety swelling seizures     Suboxone      Severe chest pain, swelling (face, eyes,feet,legs), All over itching, tightness is throat, fatigue, feels anxious, headache     Tizanidine      Contraindicated with Singulair     Amitriptyline Other (See Comments)     Other reaction(s): *Unknown     Amitriptyline Hcl Swelling     Birch Trees      Potatoes, carrots, cherries, celery, apple, pears, plums, peaches, parsnip, kiwi, hazelnuts, and apricots,      Blue Dyes (Parenteral) Itching     Headaches    headache       Buprenorphine-Naloxone      Other reaction(s): Chest Pain  Difficulty swallowing, GI cramping     Codeine " "Nausea     Vomiting       Duloxetine Hcl Other (See Comments)     Flushing, tremor/muscle twitching and edema     Gabapentin Other (See Comments)     edema  Systemic edema, weaned off from Feb to March per Dr. Dowd.    edema  Other reaction(s): Edema  edema  Systemic edema, weaned off from Feb to March per Dr. Dowd.       Grass      Hydroxyzine      Per patient \"Headache, chest pounding/pressure, shivering/tremors, goosebumps, sweating, swelling in feet and legs, agitation, back pain, joint pain, rigidity of muscles in fingers/wrist/feet, vision, pins/needles whole body-emili face and arms, rapid heart beat, nausea/vomiting, painful urination, itching, dry mouth\"     Lisinopril Other (See Comments)     Medication caused unsteady gait and falls.      Metaxalone      Other reaction(s): Vomiting     Mugwort [Artemisia Vulgaris]      Various spices     Pollen Extract      Other reaction(s): *Unknown  Mugwort, ragweed's melons bananas cucumbers zucchini     Pregabalin      Ragweeds      Melons, bananas, cucumbers, zucchini.     Tamoxifen Other (See Comments)     Bone pain, swelling (chest and face); increased anxiety; n & v, migraines;     Topamax      Topiramate      Other reaction(s): Ataxia  seotonin syndrome       Nortriptyline Itching, Visual Disturbance, Swelling, GI Disturbance, Anxiety, Other (See Comments) and Nausea     Other reaction(s): Swelling  Other reaction(s): Edema, Headache  Other reaction(s): Swelling       Social History     Socioeconomic History     Marital status:      Spouse name: None     Number of children: None     Years of education: None     Highest education level: None   Tobacco Use     Smoking status: Never     Smokeless tobacco: Never     Tobacco comments:     no 2nd hand smoke exposure   Substance and Sexual Activity     Alcohol use: No     Comment: rare     Drug use: No     Sexual activity: Not Currently     Partners: Male     Birth control/protection: Surgical     Comment: " Tubal Ligation then hysterectomy   Other Topics Concern     Parent/sibling w/ CABG, MI or angioplasty before 65F 55M? Yes     Comment: Adrian Martinez   Social History Narrative        Stopped working at [employer] in Nov 2018.,  Currently on medical leave.  Afraid she will lose position if she can not return to work in March as planned.      Lives with .  Two adult children.  Granddaughter age 1        Family History   Problem Relation Age of Onset     Allergies Mother      Arthritis Mother      Cancer Mother      Hypertension Mother      Hyperlipidemia Mother      Cerebrovascular Disease Mother         s/p brain surgery     Breast Cancer Mother      Depression Mother      Anxiety Disorder Mother      Anesthesia Reaction Mother      Asthma Mother      Skin Cancer Mother      Heart Disease Father      Diabetes Father      Coronary Artery Disease Father      Hypertension Father      Hyperlipidemia Father      Cerebrovascular Disease Father         Multiple strokes     Obesity Father      Diabetes Brother      Depression Brother      Hypertension Brother      Cancer Maternal Grandfather      Prostate Cancer Maternal Grandfather         Prostrate and Bladder     Other Cancer Maternal Grandfather         Pancreatic 1988, Bladder 1977     Cancer Maternal Grandmother      Breast Cancer Maternal Grandmother      Skin Cancer Maternal Grandmother      Cancer Brother 57        pancreatic cancer     Diabetes Brother      Breast Cancer Cousin         Grand mothers sister     Other Cancer Brother         Stage 3 Pancreatic 2-2015     Depression Brother      Asthma Brother      Obesity Brother      Anesthesia Reaction Other         H/a, itching, drug interactions     Asthma Other      Cancer Brother         Pancreatic      Thyroid Disease No family hx of      Cancer Brother      Depression Brother      Heart Disease Paternal Grandmother      Hypertension Paternal Grandmother      Coronary Artery  "Disease Paternal Grandmother      Diabetes Paternal Grandfather      Coronary Artery Disease Paternal Grandfather        ROS: 10 point ROS neg other than the symptoms noted above in the HPI.    Vital Signs: BP (!) 162/116   Pulse 111   Ht 5' 5\" (1.651 m)   Wt 185 lb (83.9 kg)   LMP  (LMP Unknown)   BMI 30.79 kg/m      Neurological Examination:  Awake  Alert  Oriented x 3  Speech clear    Motor exam:  Shoulder Abduction  Right:  5/5   Left:  5/5  Biceps                      Right:  5/5   Left:  5/5  Triceps                     Right:  5/5   Left:  5/5  Wrist Extensors        Right:  5/5   Left:  5/5  Wrist Flexors            Right:  5/5   Left:  5/5  Intrinsics                   Right:  5/5   Left:  5/5    Iliopsoas  (hip flexion)               Right: 5/5  Left:  5/5  Quadriceps  (knee extension)       Right:  5/5  Left:  5/5  Hamstrings  (knee flexion)            Right:  5/5  Left:  5/5  Gastroc Soleus  (PF)                          Right:  5/5  Left:  5/5  Tibialis Ant  (DF)                          Right:  5/5  Left:  5/5  EHL                          Right:  5/5  Left:  5/5         Sensation intact to light touch.  Reflexes are 2+ in the brachial radialis and triceps.   Negative Seth sign bilaterally.   Reflexes are 2+ in the patellar and Achilles.    Negative clonus bilaterally.     Musculoskeletal:  Gait: Able to stand from a seated position.  Antalgic gait.  Tenderness to palpation of the lower thoracic spine and upper lumbar spine.  Negative straight leg raise bilaterally.      Imaging:   EXAM: CT CERVICAL SPINE W/O CONTRAST, CT LUMBAR SPINE W/O CONTRAST, CT THORACIC SPINE W/O CONTRAST  LOCATION: [facility]  DATE/TIME: 3/12/2024 5:13 PM CDT                                                  IMPRESSION:  CERVICAL SPINE CT:  1.  No acute cervical spine fracture.     THORACIC SPINE CT:  1.  Multilevel age indeterminate superior endplate compression fractures with mild height loss, " new since 2021.  2.  No retropulsion, traumatic subluxation or canal compromise.     LUMBAR SPINE CT:  1.  Recent L1 burst fracture with mild height loss and minimal retropulsion.  2.  Recent L2 superior endplate compression fracture with mild height loss.  3.  No traumatic subluxation or high-grade canal stenosis.    EXAM: XR LUMBAR SPINE 2/3 VIEWS  LOCATION: [facility]  DATE: 3/12/2024                                                            IMPRESSION:    1.  Similar appearance of recent L1 burst fracture and L2 superior endplate fracture as seen on same-day CT imaging.  2.  Lumbar dextrocurvature. Grade 1 anterolisthesis of L4 on L5.  3.  Multilevel degenerative disc disease with disc height loss greatest and advanced at L5-S1. Multilevel facet arthropathy.  4.  Calcified atherosclerosis of the abdominal aorta.    Assessment:  History of breast cancer, thyroid cancer, and lymphoma  Mid thoracic back pain   Low back pain s/p fall  Thoracic spine CT reveals superior endplate deformities with mild height loss at T1, T2, T3, T4, T5, and T12.  Lumbar spine CT reveals recent L1 burst fracture with mild height loss and recent L2 superior endplate compression fracture with mild height loss.      Plan:   -Thoracic and lumbar spine MRIs with and without contrast ordered for further evaluation  -Valium ordered to take prior to MRI due to claustrophobia  -DEXA bone scan  -Patient would like to wear a brace for comfort.  Placed referral to orthotics.  -Will call patient with MRI results and next steps.  -Advised patient to call our clinic with any questions or concerns. Patient voiced understanding and agreement.      Mable Eid, CNP  [facility]  Tel [phone]  Pager [phone]        Again, thank you for allowing me to participate in the care of your patient.        Sincerely,        Mable Eid, PEDRO

## 2024-04-02 NOTE — NURSING NOTE
"Tisha Arias is a 63 year old female who presents for:  Chief Complaint   Patient presents with    Neurologic Problem     Closed compression fracture of L2. Xray 3/12/24. CT 3/12/24. DOI 3/12/24. Patient was having a side affect from a medication and she fell square on her bottom. She felt a crack and intense pain and she couldn't move. She was seen in . She was discharged home. She is currently on 30 mg of oxycodone prior to injury. She is here for follow up from .         Vitals:    Vitals:    04/02/24 1053   BP: (!) 162/116   Pulse: 111   Weight: 185 lb (83.9 kg)   Height: 5' 5\" (1.651 m)       BMI:  Estimated body mass index is 30.79 kg/m  as calculated from the following:    Height as of this encounter: 5' 5\" (1.651 m).    Weight as of this encounter: 185 lb (83.9 kg).    Pain Score:  Extreme Pain (9)        NICHOLE Hernandez to follow up with Primary Care provider regarding elevated blood pressure.    "

## 2024-04-02 NOTE — Clinical Note
Please call patient to schedule thoracic and lumbar spine MRIs with and without contrast, then a follow-up telephone visit with me to review imaging results.  Thank you

## 2024-04-02 NOTE — PROGRESS NOTES
[facility] Visit      CC: Back pain    Primary Care Provider: Pablo Soto    Reason For Visit:   I was asked by Dr. Soto to see this patient in consultation.       HPI: Tisha Arias is a 63 year old female with history of breast cancer, thyroid cancer, and lymphoma.  She presents for evaluation of back pain.  Patient reports mid thoracic back pain developed in December 2023.  No trauma or injuries at onset.  She describes aching mid thoracic back pain that radiates around to the chest.  Patient also reports low back pain developed after a fall on 3/12/2024.  Patient reports the fall was likely due to her blood pressure medication.  She went to the ED and imaging was completed.  Thoracic spine CT revealed superior endplate deformities with mild height loss at T1, T2, T3, T4, T5, and T12.  Lumbar spine CT revealed recent L1 burst fracture with mild height loss and recent L2 superior endplate compression fracture with mild height loss.  Patient was advised to follow-up with our clinic.  Today, patient reports continued aching mid thoracic back pain that radiates around to the chest, aching low back pain, and chronic pain in bilateral hips.  Symptoms worsen with activity and lifting. Denies any numbness, weakness, saddle anesthesia, or bowel/bladder incontinence.  Patient follows with [facility] for chronic pain medications including oxycodone and flexeril.     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 No real need for Epipen for this alone.    GDM  (gestational diabetes mellitus)     w first pregnancy only    Gestational hypertension     Lymphedema     jackson arms, chest    Migraine     Neuropathy associated with cancer (H)     Papillary carcinoma, follicular variant (H) 2013    pT3, N1, Mx, thyroid    Post-surgical hypothyroidism     Renal disease     kidney stones    Rhinitis, allergic to other allergen     Right Breast mass and microcalcifications 2011    Seasonal allergic conjunctivitis     Seasonal allergic rhinitis 11 skin tests pos. for: dog/M/T/G/W--NEGATIVE FOOD TESTS FOR: shrimp, crab, lobster, coconut       Past Medical History reviewed with patient during visit.    Past Surgical History:   Procedure Laterality Date    BACK SURGERY  ,    Bulging disc w nerve root impigment    BIOPSY BREAST      right    BIOPSY BREAST  11    right, core and sterotactic    BONE MARROW BIOPSY, BONE SPECIMEN, NEEDLE/TROCAR Left 10/3/2017    Procedure: BIOPSY BONE MARROW;  Bone Marrow Biopsy with aspirate;  Surgeon: Amelia Watkins PA-C;  Location:  OR    BREAST SURGERY      BYPASS GASTRIC, CHOLECYSTECTOMY, COMBINED       SECTION       SECTION      COLONOSCOPY      normal    COSMETIC SURGERY  2012    Final Stage of Mastectomy    COSMETIC SURGERY      DAVINCI HYSTERECTOMY TOTAL, BILATERAL SALPINGO-OOPHORECTOMY, COMBINED  2012    Procedure: COMBINED DAVINCI HYSTERECTOMY TOTAL, SALPINGO-OOPHORECTOMY;  Davinci Total Laparoscopic Hysterectomy, Bilateral Salpingo Oophorectomy, Pelvic Washings, Cystoscopy;  Surgeon: Evie Sheikh MD;  Location: [facility] OR    ENT SURGERY      ESOPHAGOSCOPY, GASTROSCOPY, DUODENOSCOPY (EGD), COMBINED N/A 2017    Procedure: COMBINED ENDOSCOPIC ULTRASOUND, ESOPHAGOSCOPY, GASTROSCOPY, DUODENOSCOPY (EGD), FINE NEEDLE ASPIRATE/BIOPSY;  Esophagogastroduodenoscopy, Endoscopic Ultrasound, with fine needle biopsy aspirate;  Surgeon: Patrick Robles MD;  [facility]: [facility] OR     EXTRACORPOREAL SHOCK WAVE LITHOTRIPSY, CYSTOSCOPY, INSERT STENT URETER(S), COMBINED Right 8/19/2019    Procedure: EXTRACORPORAL SHOCKWAVE LITHOTRIPSY,RIGHT URETERAL STENT PLACEMENT;  Surgeon: Pablo Graham MD;  Location: [facility] OR    GI SURGERY  11-    Gamaliel-en Y w cholecystectomy    GYN SURGERY  1/6/89,1/10/92    2 c-sections    HYSTERECTOMY      HYSTERECTOMY, PAP NO LONGER INDICATED  12/12    DeVinci assister lap hyst with BSO    INSERT PORT VASCULAR ACCESS Right 10/4/2017    Procedure: INSERT PORT VASCULAR ACCESS;  Port Placement;  Surgeon: Jc Goodman PA-C;  Location: [facility] OR    IR PORT REMOVAL RIGHT  11/11/2019    IRRIGATION AND DEBRIDEMENT BREAST  3/1/2012    Procedure:IRRIGATION AND DEBRIDEMENT BREAST; Irrigation and Debridement, Wound Closure Right Breast; Surgeon:JUNG GUILLEN; Location:[facility] OR    MASTECTOMY, RECONSTRUCT BREAST, COMBINED  1/30/2012    Procedure:COMBINED MASTECTOMY, RECONSTRUCT BREAST; Bilateral Mastectomies, Right Axillary [place] Node Biopsy, Bilateral Breast Reconstruction with Tissue Expanders, Reconstruction of inframammary fold, bilateral pain management systems; Surgeon:ANUPAM CAMPBELL; Location:[facility] OR    RECONSTRUCT BREAST  8/31/2012    Procedure: RECONSTRUCT BREAST;  Bilateral 2nd stage breast reconstruction, revision,      REMOVE PORT VASCULAR ACCESS Right 11/11/2019    Procedure: Right Port Removal;  Surgeon: Ely Leslie PA-C;  Location:  OR    SOFT TISSUE SURGERY  1-30-12    Mastectomy w severe myofascial pain syndrome    THYROIDECTOMY  7/10/2013    Procedure: THYROIDECTOMY;  Total Thyroidectomy with central neck dissection;  Surgeon: Indiana Sneed MD;  Location: [facility] OR    THYROIDECTOMY      TONSILLECTOMY      childhood    TONSILLECTOMY      ZZC LAPAROSCOPIC GASTRIC RESTRICTIVE PX, W/GASTRIC BYPASS/ GAMALIEL-EN-Y, < 150CM  2007    Gamaliel en Y?     Past Surgical History reviewed with patient during visit.    Current Outpatient Medications   Medication  Sig Dispense Refill    ACAI RAMIREZ PO Take 50 mg by mouth      acetaminophen (TYLENOL) 325 MG tablet Take 2 tablets (650 mg) by mouth every 4 hours as needed for mild pain or fever 100 tablet     albuterol (VENTOLIN HFA) 108 (90 Base) MCG/ACT inhaler Inhale 1-2 puffs into the lungs every 4 hours as needed for shortness of breath or wheezing 18 g 3    calcitRIOL (ROCALTROL) 0.25 MCG capsule TAKE TWO CAPSULES BY MOUTH IN THE MORNING AND TAKE ONE CAPSULE BY MOUTH IN THE EVENING. 270 capsule 4    calcium citrate-vitamin D (CALCIUM CITRATE + D3) 315-250 MG-UNIT TABS per tablet Take 2 tablets by mouth 2 times daily 60 tablet 2    celecoxib (CELEBREX) 200 MG capsule Take 1 capsule (200 mg) by mouth TWO times daily 60 capsule 3    cetirizine (ZYRTEC ALLERGY) 10 MG tablet Take 1 tablet (10 mg) by mouth 3 times daily On hold for lab test. 90 tablet 0    Cholecalciferol (VITAMIN D3) 50 MCG (2000 UT) CAPS Take 2,000 Units by mouth daily 90 capsule 1    cromolyn (OPTICROM) 4 % ophthalmic solution Place 1 drop into both eyes 4 times daily 10 mL 0    cromolyn sodium (NASALCROM) 5.2 MG/ACT nasal aerosol SPRAY ONE SPRAY( 1 ML) IN NOSTRIL DAILY 1 mL 3    cyclobenzaprine (FLEXERIL) 10 MG tablet Take 1 tablet (10 mg) by mouth 4 times daily This is a 90 day supply 360 tablet 1    diclofenac (VOLTAREN) 1 % topical gel Apply affected area two times daily as needed using enclosed dosing card. (2-4 grams to chest wall) 100 g 1    famotidine (PEPCID) 20 MG tablet Take 1 tablet (20 mg) by mouth 2 times daily This is a 90 day supply 180 tablet 1    fluconazole (DIFLUCAN) 150 MG tablet Take 1 tablet (150 mg) by mouth once as needed (yeast infection symptoms) Repeat in 5 days if still having symptoms. 2 tablet 0    fluticasone (FLONASE) 50 MCG/ACT nasal spray Spray 1-2 sprays into both nostrils daily 16 g 2    hydrOXYzine HCl (ATARAX) 25 MG tablet One tab 3-4 times a day to augment oxycodone 270 tablet 3    ketamine HCl POWD 120 mg lozenge  one-half to one lozenge up to 4 times a day as needed, #120 120 g 3    ketamine HCl POWD Ketamine 10% and lidocaine 10% in topical cream, apply to feet tid , 90 gm tube 90 g 3    KLOR-CON 20 MEQ CR tablet TAKE ONE TABLET BY MOUTH TWICE DAILY 60 tablet 0    levothyroxine (SYNTHROID/LEVOTHROID) 112 MCG tablet TAKE TWO TABLETS BY MOUTH DAILY 180 tablet 0    lidocaine (LIDODERM) 5 % patch Place 1 patch onto the skin every 24 hours To prevent lidocaine toxicity, patient should be patch free for 12 hrs daily. 30 patch 2    lidocaine (XYLOCAINE) 5 % external ointment Apply quarter size amount to chest and back up to 3 times daily as needed for pain. 350 g 1    lidocaine-prilocaine (EMLA) cream Apply topically as needed for moderate pain 60 g 1    lisinopril-hydrochlorothiazide (ZESTORETIC) 10-12.5 MG tablet Take 1 tablet by mouth daily 90 tablet 1    magnesium oxide (MAG-OX) 400 MG tablet Take 1 tablet (400 mg) by mouth 3 times daily 90 tablet 4    Menaquinone-7 (VITAMIN K2) 100 MCG CAPS Take 1 capsule by mouth 2 times daily 60 capsule 3    Methylene Blue POWD 10 mg capsule, one morning for 5 days, off 2 days, one morning and noon 5 days on, 2 days off, then two morning and one noon,  5 days on 2 days off, #90 90 g 2    montelukast (SINGULAIR) 10 MG tablet Take 2 tablets (20 mg) by mouth 2 times daily TAKE TWO TABLETS BY MOUTH TWICE DAILY Strength: 10 mg 120 tablet 11    naloxegol (MOVANTIK) 25 MG TABS tablet Take 1 tablet (25 mg) by mouth every morning (before breakfast) 30 tablet 3    naloxone (NARCAN) nasal spray Spray 1 spray (4 mg) into one nostril alternating nostrils as needed for opioid reversal every 2-3 minutes until assistance arrives 0.2 mL 0    olopatadine (PATANOL) 0.1 % ophthalmic solution INSTILL ONE DROP INTO EACH EYE TWICE DAILY 5 mL 0    omeprazole (PRILOSEC) 10 MG DR capsule Take 2 capsules (20 mg) by mouth daily 30 capsule 1    ondansetron (ZOFRAN ODT) 8 MG ODT tab DISSOLVE ONE TABLET IN MOUTH EVERY  "EIGHT HOURS AS NEEDED FOR NAUSEA Strength: 8 mg 30 tablet 1    oxyCODONE IR (ROXICODONE) 30 MG tablet Take 1 tablet (30 mg) by mouth 6 times daily Fill 4/2/24 for 4/3/24 (14 day RX) 84 tablet 0    Probiotic Product (PROBIOTIC DAILY PO) Take 1 capsule by mouth daily Lacto acid bifidobacterium      ranitidine (ZANTAC) 75 MG tablet Take 1 tablet (75 mg) by mouth 3 times daily 90 tablet 3    SUMAtriptan (IMITREX) 50 MG tablet Take 1 tablet by mouth as needed for migraine . May repeat dose in 2 hours as needed. Maximum dose 4 tablets in 24 hours 15 tablet 0    tobramycin (TOBREX) 0.3 % ophthalmic solution ADMINISTER 1 2 DROPS TO THE RIGHT EYE EVERY 4 (FOUR) HOURS FOR 7 DAYS.      tobramycin-dexamethasone (TOBRADEX) 0.3-0.1 % ophthalmic suspension INSTILL ONE DROP INTO BOTH EYES THREE TIMES DAILY      triamcinolone (KENALOG) 0.025 % ointment Apply topically as needed  3    Triamcinolone Acetonide (AZMACORT IN) Inhale 2 puffs into the lungs as needed      UNABLE TO FIND Take 2 capsules by mouth 3 times daily Muscle Mag. 2 caps contain B1 20mg, B2 20mg, B6 10mg, magesium 20mg, manganese 2mg.      UNABLE TO FIND 3 tablets 3 times daily MEDICATION NAME: calcium D-Glucarate   3 caps contain 180mg of elemental calcium.      UNABLE TO FIND 1 tablet daily MEDICATION NAME: Pure Encapsulations       No current facility-administered medications for this visit.       Allergies   Allergen Reactions    Baclofen Other (See Comments) and Unknown     Other reaction(s): Edema, chest pain, seizures.   Other reaction(s): Chest Pain, Edema, Headache  Seizures    Buprenorphine      Other reaction(s): Chest Pain  Difficulty swallowing, GI cramping    Clonidine Other (See Comments)     \"Seizures\"    Duloxetine Anaphylaxis and Other (See Comments)     Flushing, tremor/muscle twitching and edema  Serotonin syndrome  Other reaction(s): Ataxia  Flushing, tremor/muscle twitching and edema  Serotonin syndrome  SOB palpitations itching rash    " "Methocarbamol Other (See Comments)     Swelling and chest pain  Other reaction(s): Angioedema  Swelling in chest    No Clinical Screening - See Comments Shortness Of Breath, Palpitations, Anaphylaxis, Itching, Swelling, Difficulty breathing and Rash     Sukhjinder wipes- oral allergy -  July 2015: throat tightness from a Chinese herbal medicine Wilmer Tran  Other reaction(s): Edema, Tongue Swelling  Sukhjinder wipes  Has anaphylactic reactions to varied environmental things.  Carries an epi-pen  Oral allergy syndrome enviromental- food intolerances and animal dander birch trees potatoes carrots cherries celery apple pears plums peaches parsnip kiwi hazelnuts apricots     Carries epi pen    Nuts Shortness Of Breath     Other reaction(s): Throat Swelling/Closing  Please check herbal formulas for these allergens prior to prescribing.    Oxycontin [Oxycodone] Nausea and Vomiting    Serotonin Anxiety, Other (See Comments) and Swelling     Seizures    Anxiety swelling seizures    Suboxone      Severe chest pain, swelling (face, eyes,feet,legs), All over itching, tightness is throat, fatigue, feels anxious, headache    Tizanidine      Contraindicated with Singulair    Amitriptyline Other (See Comments)     Other reaction(s): *Unknown    Amitriptyline Hcl Swelling    Birch Trees      Potatoes, carrots, cherries, celery, apple, pears, plums, peaches, parsnip, kiwi, hazelnuts, and apricots,     Blue Dyes (Parenteral) Itching     Headaches    headache      Buprenorphine-Naloxone      Other reaction(s): Chest Pain  Difficulty swallowing, GI cramping    Codeine Nausea     Vomiting      Duloxetine Hcl Other (See Comments)     Flushing, tremor/muscle twitching and edema    Gabapentin Other (See Comments)     edema  Systemic edema, weaned off from Feb to March per Dr. Dowd.    edema  Other reaction(s): Edema  edema  Systemic edema, weaned off from Feb to March per Dr. Dowd.      Grass     Hydroxyzine      Per patient \"Headache, " "chest pounding/pressure, shivering/tremors, goosebumps, sweating, swelling in feet and legs, agitation, back pain, joint pain, rigidity of muscles in fingers/wrist/feet, vision, pins/needles whole body-emili face and arms, rapid heart beat, nausea/vomiting, painful urination, itching, dry mouth\"    Lisinopril Other (See Comments)     Medication caused unsteady gait and falls.     Metaxalone      Other reaction(s): Vomiting    Mugwort [Artemisia Vulgaris]      Various spices    Pollen Extract      Other reaction(s): *Unknown  Mugwort, ragweed's melons bananas cucumbers zucchini    Pregabalin     Ragweeds      Melons, bananas, cucumbers, zucchini.    Tamoxifen Other (See Comments)     Bone pain, swelling (chest and face); increased anxiety; n & v, migraines;    Topamax     Topiramate      Other reaction(s): Ataxia  seotonin syndrome      Nortriptyline Itching, Visual Disturbance, Swelling, GI Disturbance, Anxiety, Other (See Comments) and Nausea     Other reaction(s): Swelling  Other reaction(s): Edema, Headache  Other reaction(s): Swelling       Social History     Socioeconomic History    Marital status:      Spouse name: None    Number of children: None    Years of education: None    Highest education level: None   Tobacco Use    Smoking status: Never    Smokeless tobacco: Never    Tobacco comments:     no 2nd hand smoke exposure   Substance and Sexual Activity    Alcohol use: No     Comment: rare    Drug use: No    Sexual activity: Not Currently     Partners: Male     Birth control/protection: Surgical     Comment: Tubal Ligation then hysterectomy   Other Topics Concern    Parent/sibling w/ CABG, MI or angioplasty before 65F 55M? Yes     Comment: Adrian Martinez   Social History Narrative        Stopped working at [employer] in Nov 2018.,  Currently on medical leave.  Afraid she will lose position if she can not return to work in March as planned.      Lives with .  Two adult " "children.  Granddaughter age 1        Family History   Problem Relation Age of Onset    Allergies Mother     Arthritis Mother     Cancer Mother     Hypertension Mother     Hyperlipidemia Mother     Cerebrovascular Disease Mother         s/p brain surgery    Breast Cancer Mother     Depression Mother     Anxiety Disorder Mother     Anesthesia Reaction Mother     Asthma Mother     Skin Cancer Mother     Heart Disease Father     Diabetes Father     Coronary Artery Disease Father     Hypertension Father     Hyperlipidemia Father     Cerebrovascular Disease Father         Multiple strokes    Obesity Father     Diabetes Brother     Depression Brother     Hypertension Brother     Cancer Maternal Grandfather     Prostate Cancer Maternal Grandfather         Prostrate and Bladder    Other Cancer Maternal Grandfather         Pancreatic 1988, Bladder 1977    Cancer Maternal Grandmother     Breast Cancer Maternal Grandmother     Skin Cancer Maternal Grandmother     Cancer Brother 57        pancreatic cancer    Diabetes Brother     Breast Cancer Cousin         Grand mothers sister    Other Cancer Brother         Stage 3 Pancreatic 2-2015    Depression Brother     Asthma Brother     Obesity Brother     Anesthesia Reaction Other         H/a, itching, drug interactions    Asthma Other     Cancer Brother         Pancreatic     Thyroid Disease No family hx of     Cancer Brother     Depression Brother     Heart Disease Paternal Grandmother     Hypertension Paternal Grandmother     Coronary Artery Disease Paternal Grandmother     Diabetes Paternal Grandfather     Coronary Artery Disease Paternal Grandfather        ROS: 10 point ROS neg other than the symptoms noted above in the HPI.    Vital Signs: BP (!) 162/116   Pulse 111   Ht 5' 5\" (1.651 m)   Wt 185 lb (83.9 kg)   LMP  (LMP Unknown)   BMI 30.79 kg/m      Neurological Examination:  Awake  Alert  Oriented x 3  Speech clear    Motor exam:  Shoulder Abduction  Right:  5/5   Left:  " 5/5  Biceps                      Right:  5/5   Left:  5/5  Triceps                     Right:  5/5   Left:  5/5  Wrist Extensors        Right:  5/5   Left:  5/5  Wrist Flexors            Right:  5/5   Left:  5/5  Intrinsics                   Right:  5/5   Left:  5/5    Iliopsoas  (hip flexion)               Right: 5/5  Left:  5/5  Quadriceps  (knee extension)       Right:  5/5  Left:  5/5  Hamstrings  (knee flexion)            Right:  5/5  Left:  5/5  Gastroc Soleus  (PF)                          Right:  5/5  Left:  5/5  Tibialis Ant  (DF)                          Right:  5/5  Left:  5/5  EHL                          Right:  5/5  Left:  5/5         Sensation intact to light touch.  Reflexes are 2+ in the brachial radialis and triceps.   Negative Seth sign bilaterally.   Reflexes are 2+ in the patellar and Achilles.    Negative clonus bilaterally.     Musculoskeletal:  Gait: Able to stand from a seated position.  Antalgic gait.  Tenderness to palpation of the lower thoracic spine and upper lumbar spine.  Negative straight leg raise bilaterally.      Imaging:   EXAM: CT CERVICAL SPINE W/O CONTRAST, CT LUMBAR SPINE W/O CONTRAST, CT THORACIC SPINE W/O CONTRAST  LOCATION: [facility]  DATE/TIME: 3/12/2024 5:13 PM CDT                                                  IMPRESSION:  CERVICAL SPINE CT:  1.  No acute cervical spine fracture.     THORACIC SPINE CT:  1.  Multilevel age indeterminate superior endplate compression fractures with mild height loss, new since 2021.  2.  No retropulsion, traumatic subluxation or canal compromise.     LUMBAR SPINE CT:  1.  Recent L1 burst fracture with mild height loss and minimal retropulsion.  2.  Recent L2 superior endplate compression fracture with mild height loss.  3.  No traumatic subluxation or high-grade canal stenosis.    EXAM: XR LUMBAR SPINE 2/3 VIEWS  LOCATION: [facility]  DATE: 3/12/2024                                                             IMPRESSION:    1.  Similar appearance of recent L1 burst fracture and L2 superior endplate fracture as seen on same-day CT imaging.  2.  Lumbar dextrocurvature. Grade 1 anterolisthesis of L4 on L5.  3.  Multilevel degenerative disc disease with disc height loss greatest and advanced at L5-S1. Multilevel facet arthropathy.  4.  Calcified atherosclerosis of the abdominal aorta.    Assessment:  History of breast cancer, thyroid cancer, and lymphoma  Mid thoracic back pain   Low back pain s/p fall  Thoracic spine CT reveals superior endplate deformities with mild height loss at T1, T2, T3, T4, T5, and T12.  Lumbar spine CT reveals recent L1 burst fracture with mild height loss and recent L2 superior endplate compression fracture with mild height loss.      Plan:   -Thoracic and lumbar spine MRIs with and without contrast ordered for further evaluation  -Valium ordered to take prior to MRI due to claustrophobia  -DEXA bone scan  -Patient would like to wear a brace for comfort.  Placed referral to orthotics.  -Will call patient with MRI results and next steps.  -Advised patient to call our clinic with any questions or concerns. Patient voiced understanding and agreement.      Mable Eid [facility] Neurosurgery  Tel [phone]  Pager [phone]

## 2024-04-02 NOTE — PATIENT INSTRUCTIONS
Thoracic and lumbar spine MRIs    Valium ordered to take prior to MRI due to claustrophobia    Referral to Orthotics for brace    DEXA bone scan    Care team will call you to schedule a follow-up telephone visit to review imaging results

## 2024-04-02 NOTE — TELEPHONE ENCOUNTER
[facility]-schedule thoracic and lumbar spine MRIs with and without contrast, then a follow-up telephone visit with Mable Eid NP to review imaging results.

## 2024-04-05 NOTE — TELEPHONE ENCOUNTER
2nd attempt. [facility]-schedule thoracic and lumbar spine MRIs with and without contrast, then a follow-up telephone visit with Mable Eid NP to review imaging results.

## 2024-04-17 NOTE — TELEPHONE ENCOUNTER
3rd attempt to reach patient and schedule MRI's along with a phone visit follow up with Mable Eid to review the results.     Patient was advised to call [phone] for assistance with scheduling both MRI's and a follow up visit.

## 2024-04-24 DIAGNOSIS — G43.519 INTRACTABLE PERSISTENT MIGRAINE AURA WITHOUT CEREBRAL INFARCTION AND WITHOUT STATUS MIGRAINOSUS: ICD-10-CM

## 2024-04-24 RX ORDER — SUMATRIPTAN 50 MG/1
TABLET, FILM COATED ORAL
Qty: 15 TABLET | Refills: 0 | Status: SHIPPED | OUTPATIENT
Start: 2024-04-24 | End: 2024-05-23

## 2024-04-24 NOTE — TELEPHONE ENCOUNTER
Received fax request from pharmacy requesting refill(s) for SUMAtriptan (IMITREX) 50 MG tablet    Last refilled on 3/12/2024 per pharmacy.     Pt last seen on 2/27/2024.  Next appt scheduled for 6/3/2024.    Will facilitate refill.

## 2024-05-23 DIAGNOSIS — E89.0 POSTSURGICAL HYPOTHYROIDISM: ICD-10-CM

## 2024-05-23 DIAGNOSIS — C73 PAPILLARY CARCINOMA, FOLLICULAR VARIANT (H): ICD-10-CM

## 2024-05-23 DIAGNOSIS — E89.2 POSTSURGICAL HYPOPARATHYROIDISM (H): ICD-10-CM

## 2024-05-23 DIAGNOSIS — G43.519 INTRACTABLE PERSISTENT MIGRAINE AURA WITHOUT CEREBRAL INFARCTION AND WITHOUT STATUS MIGRAINOSUS: ICD-10-CM

## 2024-05-23 RX ORDER — LEVOTHYROXINE SODIUM 112 UG/1
224 TABLET ORAL DAILY
Qty: 180 TABLET | Refills: 2 | Status: SHIPPED | OUTPATIENT
Start: 2024-05-23

## 2024-05-23 NOTE — TELEPHONE ENCOUNTER
Medication Question or Refill    Contacts         Type Contact Phone/Fax    05/23/2024 01:05 PM CDT Phone (Incoming) Elvin Arias (Self) [phone] (M)            What medication are you calling about (include dose and sig)?: SUMAtriptan (IMITREX) 50 MG tablet and levothyroxine (SYNTHROID/LEVOTHROID) 112 MCG tablet     Preferred Pharmacy:   [id number] - [place] - [id number] [address]  [address] [address] [facility] [address] [place] [address]  Phone: [phone] Fax: [phone]    Controlled Substance Agreement on file:   CSA -- Patient Level:     [Media Unavailable] Controlled Substance Agreement - Opioid - Scan on 8/18/2023  3:13 PM   [Media Unavailable] Controlled Substance Agreement - Opioid - Scan on 10/24/2019: OUTSIDE RECORD       Who prescribed the medication?: Dr. Soto    Do you need a refill? Yes    When did you use the medication last? ?    Patient offered an appointment? No    Do you have any questions or concerns?  No    Could we send this information to you in [facility] or would you prefer to receive a phone call?:   Patient would prefer a phone call   Okay to leave a detailed message?: Yes at Cell number on file:    Telephone Information:   Mobile [phone]

## 2024-05-24 RX ORDER — SUMATRIPTAN 50 MG/1
TABLET, FILM COATED ORAL
Qty: 15 TABLET | Refills: 11 | Status: SHIPPED | OUTPATIENT
Start: 2024-05-24

## 2024-06-03 ENCOUNTER — OFFICE VISIT (OUTPATIENT)
Dept: PALLIATIVE MEDICINE | Facility: OTHER | Age: 64
End: 2024-06-03
Attending: ANESTHESIOLOGY
Payer: COMMERCIAL

## 2024-06-03 VITALS — BODY MASS INDEX: 30.62 KG/M2 | WEIGHT: 184 LBS

## 2024-06-03 DIAGNOSIS — R07.89 CHEST WALL PAIN: ICD-10-CM

## 2024-06-03 DIAGNOSIS — G89.4 CHRONIC PAIN SYNDROME: ICD-10-CM

## 2024-06-03 DIAGNOSIS — Z79.891 LONG TERM (CURRENT) USE OF OPIATE ANALGESIC: Primary | ICD-10-CM

## 2024-06-03 DIAGNOSIS — G89.4 CHRONIC PAIN DISORDER: ICD-10-CM

## 2024-06-03 LAB
CANNABINOIDS UR QL SCN: NORMAL
CREAT UR-MCNC: 40 MG/DL
ETHANOL UR QL SCN: NORMAL

## 2024-06-03 PROCEDURE — 80357 KETAMINE AND NORKETAMINE: CPT | Performed by: ANESTHESIOLOGY

## 2024-06-03 PROCEDURE — 99213 OFFICE O/P EST LOW 20 MIN: CPT | Performed by: ANESTHESIOLOGY

## 2024-06-03 PROCEDURE — G0463 HOSPITAL OUTPT CLINIC VISIT: HCPCS | Mod: 25 | Performed by: ANESTHESIOLOGY

## 2024-06-03 PROCEDURE — G0480 DRUG TEST DEF 1-7 CLASSES: HCPCS | Performed by: ANESTHESIOLOGY

## 2024-06-03 PROCEDURE — 80307 DRUG TEST PRSMV CHEM ANLYZR: CPT | Performed by: ANESTHESIOLOGY

## 2024-06-03 PROCEDURE — 80367 DRUG SCREENING PROPOXYPHENE: CPT | Performed by: ANESTHESIOLOGY

## 2024-06-03 PROCEDURE — 80359 METHYLENEDIOXYAMPHETAMINES: CPT | Performed by: ANESTHESIOLOGY

## 2024-06-03 RX ORDER — OXYCODONE HYDROCHLORIDE 30 MG/1
30 TABLET ORAL
Qty: 84 TABLET | Refills: 0 | Status: SHIPPED | OUTPATIENT
Start: 2024-06-03 | End: 2024-06-21

## 2024-06-03 RX ORDER — KETAMINE HCL 100 %
POWDER (GRAM) MISCELLANEOUS
Qty: 120 G | Refills: 3 | Status: SHIPPED | OUTPATIENT
Start: 2024-06-03

## 2024-06-03 RX ORDER — MAGNESIUM OXIDE 400 MG/1
400 TABLET ORAL 3 TIMES DAILY
Qty: 90 TABLET | Refills: 4 | Status: SHIPPED | OUTPATIENT
Start: 2024-06-03

## 2024-06-03 ASSESSMENT — PAIN SCALES - GENERAL: PAINLEVEL: MILD PAIN (2)

## 2024-06-03 NOTE — PROGRESS NOTES
Patient presents to the clinic today for a visit with ADRI DIXON MD regarding Pain Management.          10/30/2023     1:54 PM 12/27/2023     2:28 PM 6/3/2024     4:15 PM   PEG Score   PEG Total Score 10 9 8.67       UDS/CSA- 04.18.2023 (Intend to obtain today)    Medications- Oxycodone last taken an hour ago today, 03:19pm    Ketamine last night @11pm    Notes    Ginette Ford  [facility] Clinical Assistant
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
## 2024-06-03 NOTE — LETTER

## 2024-06-03 NOTE — PATIENT INSTRUCTIONS
[facility] Number:  [phone]  Call with any questions about your care and for scheduling assistance.   Calls are returned Monday through Friday between 8 AM and 4:30 PM. We usually get back to you within 2 business days depending on the issue/request.    If we are prescribing your medications:  For opioid medication refills, call the clinic or send a [employer] message 7 days in advance.  Please include:  Name of requested medication  Name of the pharmacy.  For non-opioid medications, call your pharmacy directly to request a refill. Please allow 3-4 days to be processed.   Per [place] State Law:  All controlled substance prescriptions must be filled within 30 days of being written.    For those controlled substances allowing refills, pickup must occur within 30 days of last fill.      We believe regular attendance is key to your success in our program!    Any time you are unable to keep your appointment we ask that you call us at least 24 hours in advance to cancel.This will allow us to offer the appointment time to another patient.   Multiple missed appointments may lead to dismissal from the clinic.     PLAN:    Discussed with opioid-induced hyperalgesia, and analgesic rebound headaches.    Continue the oxycodone 30 mg every 4 hours, discussed can make changes if needed.    Continue the ketamine on 20 mg lozenges, 1/2  to 1 lozenge 3 times a day as needed.    You are scheduling a DEXA scan and following up with neurosurgery.    Follow-up with Dr. Berry for return visit in 3 months

## 2024-06-05 LAB
OXYCODONE UR CFM-MCNC: 4840 NG/ML
OXYCODONE/CREAT UR: ABNORMAL NG/MG {CREAT}
OXYMORPHONE UR CFM-MCNC: 3800 NG/ML
OXYMORPHONE/CREAT UR: 9500 NG/MG {CREAT}

## 2024-07-29 ENCOUNTER — NURSE TRIAGE (OUTPATIENT)
Dept: TRANSPLANT | Facility: CLINIC | Age: 64
End: 2024-07-29
Payer: COMMERCIAL

## 2024-07-29 NOTE — TELEPHONE ENCOUNTER
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 Pt reports that she has SOB w/exertion, central chest pain that goes into her back. Denies night sweats or fever.    States she was referred to Dr. Sauceda by her Pain an Palliative Care provider. This writer cannot find that referral in pt's chart.  Reports that she has not seen PCP for this concern because \"he will just tell me to go back to my oncologist.\"  Has not been seen since 2019.  Explained that we will probably need a referral for her to be seen before making an apt.   This writer reviewed Pain and Palliative Care Provider note in chart and there was no mention of him referring her to Oncology for sx reported today.    Background:   Treating Provider:   Garima Sauceda MD    Hx of Breast cancer, thyroid cancer, and high-grade B cell lymphoma    Date of last office visit: 11/11/20 with Dr. Sauceda  Saw Dr. Crawley last on 11/22/21    Per Dr. Crawley note on 11/19/20:   - presented to the ER on 9/1/17 for chest pain. CT-PA was negative for PE but showed right 3cm paraspinal mass and subcarinal adenopathy. PET on 9/6/17 showed the paraspinal mass to be hypermetabolic along with hilar and mediastinal nodes.   - biopsy of T10 vertebral body on 9/15/17 showed high-grade B cell lymphoma   - FNA EUS of subcarinal LN on 9/14 showed mostly necrotic tissue with suspicion for malignancy   - pt received DA-EPOCH under the care of Dr Garima Sauceda    Recent treatments: No follows with Pain and Palliative Care    Assessment  Onset of symptoms: two weeks ago.  States she was dx w/mediastinal lymphoma in 2018 (chart indicates 2017). Sx at the time were SOB with exertion and especially noted when she walked from her office to the cafeteria. Chest hurt bad and pain wrapped around L side to back. Initially in 2017, she presented " "to ED and XR was negative, but further scanning found a paraspinal mass and she was diagnosed with lymphoma.  Pt reports the cancer \"ate through\" 7-8 ribs and wrapped around her spine. She never had night sweats or fevers. She reports that in 2019, she was considered in remission.  She reports that when COVID hit she never went back for a follow up apt.     She reports she has not seen a provider about her symptoms but also reports that her Pain and Palliative Care provider referred her to the oncologist d/t her sx.   She reports that sometimes it takes a while to get her RR down after exertion.   Regarding her chest pain, she states she can't take a deep breath, lie down to sleep, and when she moves, she feels something moving in her chest.  She has swelling in her legs above her thigh in her L leg, but Kirk's is negative, and pt reports that it is her LN and she can see her LN.Swelling is present in her L leg up to her thigh too and R calf and ankle are larger than L calf and ankle. Denies any pain in her calves, but does have pain in her feet.    Recommendations:   Advised pt to go to ED if sx worsen, especially CP, SOB. Pt voiced understanding.  Explained that I am unable to schedule pt but that I would send this information to Nurse Navigation for follow up; but since it has been just under three years since she saw Dr. Crawley, I will route to Care Teams too.    Message routed to Nurse Navigation and Care Teams.          "

## 2024-07-30 ENCOUNTER — PATIENT OUTREACH (OUTPATIENT)
Dept: ONCOLOGY | Facility: CLINIC | Age: 64
End: 2024-07-30

## 2024-07-30 ENCOUNTER — LAB (OUTPATIENT)
Dept: LAB | Facility: CLINIC | Age: 64
End: 2024-07-30
Payer: COMMERCIAL

## 2024-07-30 ENCOUNTER — ANCILLARY PROCEDURE (OUTPATIENT)
Dept: CT IMAGING | Facility: CLINIC | Age: 64
End: 2024-07-30
Payer: COMMERCIAL

## 2024-07-30 DIAGNOSIS — C83.30 DIFFUSE LARGE B-CELL LYMPHOMA, UNSPECIFIED BODY REGION (H): Primary | ICD-10-CM

## 2024-07-30 DIAGNOSIS — J45.20 MILD INTERMITTENT ASTHMA WITHOUT COMPLICATION: ICD-10-CM

## 2024-07-30 DIAGNOSIS — C85.10 HIGH GRADE B-CELL LYMPHOMA (H): ICD-10-CM

## 2024-07-30 DIAGNOSIS — G89.4 CHRONIC PAIN SYNDROME: ICD-10-CM

## 2024-07-30 DIAGNOSIS — C85.10 HIGH GRADE B-CELL LYMPHOMA (H): Primary | ICD-10-CM

## 2024-07-30 DIAGNOSIS — S12.691G OTHER CLOSED NONDISPLACED FRACTURE OF SEVENTH CERVICAL VERTEBRA WITH DELAYED HEALING, SUBSEQUENT ENCOUNTER: ICD-10-CM

## 2024-07-30 LAB
BASOPHILS # BLD AUTO: 0.1 10E3/UL (ref 0–0.2)
BASOPHILS NFR BLD AUTO: 1 %
CREAT BLD-MCNC: 0.9 MG/DL (ref 0.5–1)
EGFRCR SERPLBLD CKD-EPI 2021: >60 ML/MIN/1.73M2
EOSINOPHIL # BLD AUTO: 0.1 10E3/UL (ref 0–0.7)
EOSINOPHIL NFR BLD AUTO: 1 %
ERYTHROCYTE [DISTWIDTH] IN BLOOD BY AUTOMATED COUNT: 15.7 % (ref 10–15)
HCT VFR BLD AUTO: 43.2 % (ref 35–47)
HGB BLD-MCNC: 14 G/DL (ref 11.7–15.7)
IMM GRANULOCYTES # BLD: 0 10E3/UL
IMM GRANULOCYTES NFR BLD: 0 %
LYMPHOCYTES # BLD AUTO: 1.9 10E3/UL (ref 0.8–5.3)
LYMPHOCYTES NFR BLD AUTO: 20 %
MCH RBC QN AUTO: 29.7 PG (ref 26.5–33)
MCHC RBC AUTO-ENTMCNC: 32.4 G/DL (ref 31.5–36.5)
MCV RBC AUTO: 92 FL (ref 78–100)
MONOCYTES # BLD AUTO: 0.8 10E3/UL (ref 0–1.3)
MONOCYTES NFR BLD AUTO: 8 %
NEUTROPHILS # BLD AUTO: 7 10E3/UL (ref 1.6–8.3)
NEUTROPHILS NFR BLD AUTO: 71 %
PLATELET # BLD AUTO: 495 10E3/UL (ref 150–450)
RBC # BLD AUTO: 4.72 10E6/UL (ref 3.8–5.2)
WBC # BLD AUTO: 9.9 10E3/UL (ref 4–11)

## 2024-07-30 PROCEDURE — 36415 COLL VENOUS BLD VENIPUNCTURE: CPT

## 2024-07-30 PROCEDURE — 82306 VITAMIN D 25 HYDROXY: CPT

## 2024-07-30 PROCEDURE — 85025 COMPLETE CBC W/AUTO DIFF WBC: CPT

## 2024-07-30 PROCEDURE — 74177 CT ABD & PELVIS W/CONTRAST: CPT | Mod: TC | Performed by: RADIOLOGY

## 2024-07-30 PROCEDURE — 80053 COMPREHEN METABOLIC PANEL: CPT

## 2024-07-30 PROCEDURE — 71260 CT THORAX DX C+: CPT | Mod: TC | Performed by: RADIOLOGY

## 2024-07-30 PROCEDURE — 82565 ASSAY OF CREATININE: CPT

## 2024-07-30 PROCEDURE — 84550 ASSAY OF BLOOD/URIC ACID: CPT

## 2024-07-30 PROCEDURE — 83615 LACTATE (LD) (LDH) ENZYME: CPT

## 2024-07-30 RX ORDER — IOPAMIDOL 755 MG/ML
99 INJECTION, SOLUTION INTRAVASCULAR ONCE
Status: COMPLETED | OUTPATIENT
Start: 2024-07-30 | End: 2024-07-30

## 2024-07-30 RX ORDER — ALBUTEROL SULFATE 90 UG/1
1-2 AEROSOL, METERED RESPIRATORY (INHALATION) EVERY 4 HOURS PRN
Qty: 18 G | Refills: 3 | Status: SHIPPED | OUTPATIENT
Start: 2024-07-30

## 2024-07-30 RX ORDER — OXYCODONE HYDROCHLORIDE 30 MG/1
30 TABLET ORAL
Qty: 84 TABLET | Refills: 0 | Status: SHIPPED | OUTPATIENT
Start: 2024-07-30 | End: 2024-08-14

## 2024-07-30 RX ADMIN — IOPAMIDOL 99 ML: 755 INJECTION, SOLUTION INTRAVASCULAR at 17:26

## 2024-07-30 NOTE — PROGRESS NOTES
"New Patient Oncology Nurse Navigator Note     Referral Received: 07/30/24      Referring provider: Self    Referring Clinic/Organization: Self Referred     Referred to: Malignant Hematology    Requested provider (if applicable): Dr. Sauceda     Evaluation for : DLBCL     Clinical History (per Nurse review of records provided):      Triage note from 7/30:   cecile reporting the following symptoms: symptoms the same as she had when she was diagnosed with mediastinal lymphoma in 2018. Pt reports that she has SOB w/exertion, central chest pain that goes into her back. Denies night sweats or fever.     States she was referred to Dr. Sauceda by her Pain an Palliative Care provider. This writer cannot find that referral in pt's chart.  Reports that she has not seen PCP for this concern because \"he will just tell me to go back to my oncologist.\"  Has not been seen since 2019.  Explained that we will probably need a referral for her to be seen before making an apt.   This writer reviewed Pain and Palliative Care Provider note in chart and there was no mention of him referring her to Oncology for sx reported today.     Background:   Treating Provider:   Garima Sauceda MD     Hx of Breast cancer, thyroid cancer, and high-grade B cell lymphoma     Date of last office visit: 11/11/20 with Dr. Sauceda  Saw Dr. Crawley last on 11/22/21     Per Dr. Crawley note on 11/19/20:   - presented to the ER on 9/1/17 for chest pain. CT-PA was negative for PE but showed right 3cm paraspinal mass and subcarinal adenopathy. PET on 9/6/17 showed the paraspinal mass to be hypermetabolic along with hilar and mediastinal nodes.   - biopsy of T10 vertebral body on 9/15/17 showed high-grade B cell lymphoma   - FNA EUS of subcarinal LN on 9/14 showed mostly necrotic tissue with suspicion for malignancy   - pt received DA-EPOCH under the care of Dr Garima Sauceda     Recent treatments: No follows with Pain and Palliative Care   " "  Assessment  Onset of symptoms: two weeks ago.  States she was dx w/mediastinal lymphoma in 2018 (chart indicates 2017). Sx at the time were SOB with exertion and especially noted when she walked from her office to the cafeteria. Chest hurt bad and pain wrapped around L side to back. Initially in 2017, she presented to ED and XR was negative, but further scanning found a paraspinal mass and she was diagnosed with lymphoma.  Pt reports the cancer \"ate through\" 7-8 ribs and wrapped around her spine. She never had night sweats or fevers. She reports that in 2019, she was considered in remission.  She reports that when COVID hit she never went back for a follow up apt.      She reports she has not seen a provider about her symptoms but also reports that her Pain and Palliative Care provider referred her to the oncologist d/t her sx.   She reports that sometimes it takes a while to get her RR down after exertion.   Regarding her chest pain, she states she can't take a deep breath, lie down to sleep, and when she moves, she feels something moving in her chest.  She has swelling in her legs above her thigh in her L leg, but Kirk's is negative, and pt reports that it is her LN and she can see her LN.Swelling is present in her L leg up to her thigh too and R calf and ankle are larger than L calf and ankle. Denies any pain in her calves, but does have pain in her feet.    Records Location: [facility]     Records Needed:     N/A    Additional testing needed prior to consult:     Labs and CT    Referral updates and Plan:     07/30/2024 1:05 PM -   Pt called clinic line yesterday attempting to get scheduled with Dr. Sauceda stating she believes her symptoms are back.  Reached out to patient who described sx as SOB on exertion and at rest, major chest pressure, burning in chest, O2 sats dropping to low 90s.  Pt did not want to go to the ER as she has lots of distrust in the medical system.  Explained my role and reason I am " involved as it has been more than 3 years since she has seen Dr. Sauceda.  Paged provider for urgent add on tomorrow.  Dr. Sauceda agreed to see her and would like labs and CT prior.  Placed order for referral.     Pt called back and explained she will need CT and labs prior and will be scheduled with Dr. Sauceda tomorrow.  Also advised if symptoms worsen she needs to be seen in the ED.  Pt agrees.     Leilani Wagoner, GREGORION, RN  Hematology/Oncology Nurse Navigator  [facility]  [phone] / [phone]

## 2024-07-30 NOTE — TELEPHONE ENCOUNTER
Patient called to check status of this medication. Patient states she really needs to use this med. RN discussed if having severe difficulty breathing patient should be seen in ER.       Patient declined this and stated she just needs the medications. RN relayed to patient that this medication was just sent into pharmacy right at end of conversation.     Patient will check on status of script and will call with any issues.       Bertha Bazzi RN on 7/30/2024 at 12:52 PM

## 2024-07-30 NOTE — TELEPHONE ENCOUNTER
Medication Question or Refill    Contacts       Contact Date/Time Type Contact Phone/Fax    2024 12:44 PM CDT Phone (Incoming) KarinElvin shepherd (Self) [phone] (M)            What medication are you calling about (include dose and sig)?: albuterol (VENTOLIN HFA) 108 (90 Base) MCG/ACT inhaler     Preferred Pharmacy:    [id number] - [place], [place]  [address] [address]  Phone: [phone] Fax: [phone]      Controlled Substance Agreement on file:   CSA -- Patient Level:     [Media Unavailable] Controlled Substance Agreement - Opioid - Scan on 6/3/2024  5:07 PM: [facility] OPIOID CONTROLLED SUBSTANCE AGREEMENT FORM   [Media Unavailable] Controlled Substance Agreement - Opioid - Scan on 2023  3:13 PM   [Media Unavailable] Controlled Substance Agreement - Opioid - Scan on 10/24/2019: OUTSIDE RECORD       Who prescribed the medication?: albuterol (VENTOLIN HFA) 108 (90 Base) MCG/ACT inhaler     Do you need a refill? Yes    When did you use the medication last? Today, her current on is  and she is needing to use it today. Please send over ASAP    Do you have any questions or concerns?  Yes: She wanted it to be sent over ASAP. She didn't like that I could not send it and requested to be transferred to the nurse line. Patient was transferred to that line.    Could we send this information to you in [facility] or would you prefer to receive a phone call?:   Patient would prefer a phone call   Okay to leave a detailed message?: Yes at Cell number on file:    Telephone Information:   Mobile [phone]

## 2024-07-30 NOTE — TELEPHONE ENCOUNTER
Call to pt to inform her of  recommendation to report to ED if urgent. Notified pt writer would forward message to  with her request for appt. Advised ED if sx have worsened or having SOB/chest pain. PT verbalized understanding.

## 2024-07-30 NOTE — TELEPHONE ENCOUNTER
RECORDS STATUS - ALL OTHER DIAGNOSIS      RECORDS RECEIVED FROM: [facility] - Internal records   DATE RECEIVED: 7/30

## 2024-07-31 ENCOUNTER — ONCOLOGY VISIT (OUTPATIENT)
Dept: TRANSPLANT | Facility: CLINIC | Age: 64
End: 2024-07-31
Payer: COMMERCIAL

## 2024-07-31 ENCOUNTER — PRE VISIT (OUTPATIENT)
Dept: TRANSPLANT | Facility: CLINIC | Age: 64
End: 2024-07-31
Payer: COMMERCIAL

## 2024-07-31 VITALS
DIASTOLIC BLOOD PRESSURE: 128 MMHG | WEIGHT: 195 LBS | RESPIRATION RATE: 16 BRPM | HEART RATE: 121 BPM | OXYGEN SATURATION: 98 % | HEIGHT: 63 IN | TEMPERATURE: 97.9 F | SYSTOLIC BLOOD PRESSURE: 182 MMHG | BODY MASS INDEX: 34.55 KG/M2

## 2024-07-31 DIAGNOSIS — R06.09 DOE (DYSPNEA ON EXERTION): ICD-10-CM

## 2024-07-31 DIAGNOSIS — R60.0 LEG EDEMA, RIGHT: Primary | ICD-10-CM

## 2024-07-31 DIAGNOSIS — C83.30 DIFFUSE LARGE B-CELL LYMPHOMA, UNSPECIFIED BODY REGION (H): ICD-10-CM

## 2024-07-31 LAB
ALBUMIN SERPL BCG-MCNC: 4.6 G/DL (ref 3.5–5.2)
ALP SERPL-CCNC: 159 U/L (ref 40–150)
ALT SERPL W P-5'-P-CCNC: 29 U/L (ref 0–50)
ANION GAP SERPL CALCULATED.3IONS-SCNC: 12 MMOL/L (ref 7–15)
AST SERPL W P-5'-P-CCNC: 30 U/L (ref 0–45)
BILIRUB SERPL-MCNC: 0.4 MG/DL
BUN SERPL-MCNC: 12.4 MG/DL (ref 8–23)
CALCIUM SERPL-MCNC: 9.2 MG/DL (ref 8.8–10.4)
CHLORIDE SERPL-SCNC: 101 MMOL/L (ref 98–107)
CREAT SERPL-MCNC: 0.85 MG/DL (ref 0.51–0.95)
EGFRCR SERPLBLD CKD-EPI 2021: 77 ML/MIN/1.73M2
GLUCOSE SERPL-MCNC: 133 MG/DL (ref 70–99)
HCO3 SERPL-SCNC: 28 MMOL/L (ref 22–29)
LDH SERPL L TO P-CCNC: 301 U/L (ref 0–250)
POTASSIUM SERPL-SCNC: 4 MMOL/L (ref 3.4–5.3)
PROT SERPL-MCNC: 8 G/DL (ref 6.4–8.3)
SODIUM SERPL-SCNC: 141 MMOL/L (ref 135–145)
URATE SERPL-MCNC: 5.5 MG/DL (ref 2.4–5.7)
VIT D+METAB SERPL-MCNC: 34 NG/ML (ref 20–50)

## 2024-07-31 PROCEDURE — G2211 COMPLEX E/M VISIT ADD ON: HCPCS

## 2024-07-31 PROCEDURE — G0463 HOSPITAL OUTPT CLINIC VISIT: HCPCS

## 2024-07-31 PROCEDURE — 99205 OFFICE O/P NEW HI 60 MIN: CPT | Mod: GC

## 2024-07-31 RX ORDER — OXYCODONE AND ACETAMINOPHEN 5; 325 MG/1; MG/1
1 TABLET ORAL EVERY 6 HOURS PRN
COMMUNITY
Start: 2024-03-19

## 2024-07-31 ASSESSMENT — PAIN SCALES - GENERAL: PAINLEVEL: SEVERE PAIN (6)

## 2024-07-31 NOTE — NURSING NOTE
"Oncology Rooming Note    July 31, 2024 1:58 PM   Tisha Arias is a 63 year old female who presents for:    Chief Complaint   Patient presents with    Oncology Clinic Visit     Diffuse large B-cell lymphoma, unspecified body region      Initial Vitals: BP (!) 182/128   Pulse (!) 121   Temp 97.9  F (36.6  C)   Resp 16   Ht 1.61 m (5' 3.39\")   Wt 88.5 kg (195 lb)   LMP  (LMP Unknown)   SpO2 98%   BMI 34.12 kg/m   Estimated body mass index is 34.12 kg/m  as calculated from the following:    Height as of this encounter: 1.61 m (5' 3.39\").    Weight as of this encounter: 88.5 kg (195 lb). Body surface area is 1.99 meters squared.  Severe Pain (6) Comment: Data Unavailable   No LMP recorded (lmp unknown). Patient has had a hysterectomy.  Allergies reviewed: Yes  Medications reviewed: Yes    Medications: Medication refills not needed today.  Pharmacy name entered into [employer]:    [id number] - [place], [place] - [address] [id number] - [place], [place] - [address] [facility]    Frailty Screening:   Is the patient here for a new oncology consult visit in cancer care? 2. No      Clinical concerns: no other complaints      Ramirez Corral"
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
## 2024-08-01 DIAGNOSIS — G89.4 CHRONIC PAIN SYNDROME: Primary | ICD-10-CM

## 2024-08-01 RX ORDER — CHOLECALCIFEROL (VITAMIN D3) 50 MCG
1 TABLET ORAL DAILY
Qty: 30 TABLET | Refills: 2 | Status: SHIPPED | OUTPATIENT
Start: 2024-08-01
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
## 2024-08-12 ENCOUNTER — PATIENT OUTREACH (OUTPATIENT)
Dept: ONCOLOGY | Facility: CLINIC | Age: 64
End: 2024-08-12
Payer: COMMERCIAL

## 2024-08-12 NOTE — PROGRESS NOTES
"[facility]: Cancer Care Short Note                                    Discussion with Patient:                                                      Called pt after 7/31 office visit/consult with Dr. Sauceda-- pt was a pt of Dr. Sauceda's in the past but lost follow up. Introduced self as RN Care Coordinator. Went over contact information for [facility]. Discussed roles of RNCC, MD, SANJANA, nurse triage line, and clinic coordinators. Discussed how to get a hold of different team members via [employer] and at [phone]. Patient has tests on 8/15-- care team to follow up with pt after these are done for further guidance on follow up.     Pt verbalizes frustration that she \"feel between the crack\" for follow up care. RNCC offered active listening and TLC. She is requesting a print out of her progress note from Dr. Sauceda-- will send this to confirmed address on file.     Pt verbalizes understanding and has no other questions, comments, or concerns at this time.     Nicci Malcolm, RN, MSN, OCN   RN Care Coordinator   [facility] [address] [address]   [phone]     "

## 2024-08-14 ENCOUNTER — MYC REFILL (OUTPATIENT)
Dept: FAMILY MEDICINE | Facility: CLINIC | Age: 64
End: 2024-08-14
Payer: COMMERCIAL

## 2024-08-14 ENCOUNTER — MYC REFILL (OUTPATIENT)
Dept: TRANSPLANT | Facility: CLINIC | Age: 64
End: 2024-08-14
Payer: COMMERCIAL

## 2024-08-14 DIAGNOSIS — Z91.018 TREE NUT ALLERGY: ICD-10-CM

## 2024-08-14 DIAGNOSIS — J30.2 SEASONAL ALLERGIC RHINITIS, UNSPECIFIED TRIGGER: ICD-10-CM

## 2024-08-14 DIAGNOSIS — B96.89 BACTERIAL SINUSITIS: ICD-10-CM

## 2024-08-14 DIAGNOSIS — C85.10 HIGH GRADE B-CELL LYMPHOMA (H): ICD-10-CM

## 2024-08-14 DIAGNOSIS — J32.9 BACTERIAL SINUSITIS: ICD-10-CM

## 2024-08-14 DIAGNOSIS — E87.6 HYPOKALEMIA: ICD-10-CM

## 2024-08-14 RX ORDER — ONDANSETRON 8 MG/1
TABLET, ORALLY DISINTEGRATING ORAL
Qty: 30 TABLET | Refills: 0 | Status: SHIPPED | OUTPATIENT
Start: 2024-08-14 | End: 2024-10-07

## 2024-08-14 NOTE — TELEPHONE ENCOUNTER
"Received fax from pharmacy with the following message:     Tisha's new insurance required max 7 days on \"first fill\" with them. Filled Rx for #42 tabs, and voided remainder 01/08/2023. Please send new Rx for remaining tablets. Thanks! [facility]  " Opt out

## 2024-08-14 NOTE — TELEPHONE ENCOUNTER
Medication Question or Refill    Contacts       Contact Date/Time Type Contact Phone/Fax    2024 12:54 PM CDT Phone (Incoming) Elvin Arias (Self) [phone] (M)        What medication are you calling about (include dose and sig)?: EPI Pen is , she needs a new one sent over.     EPINEPHrine (EPIPEN 2-CARIDAD) 0.3 MG/0.3ML injection 2-pack 2 each 5 10/12/2021 10/12/2021 --   Sig - Route: Inject 0.3 mLs (0.3 mg) into the muscle once for 1 dose - Intramuscular     Tree nut allergy [Z91.018]   Preferred Pharmacy:   [id number] - [place], [place] - [address]  [address] [place] [address]  Phone: [phone] Fax: [phone]  Controlled Substance Agreement on file:   CSA -- Patient Level:     [Media Unavailable] Controlled Substance Agreement - Opioid - Scan on 6/3/2024  5:07 PM: [facility] OPIOID CONTROLLED SUBSTANCE AGREEMENT FORM   [Media Unavailable] Controlled Substance Agreement - Opioid - Scan on 2023  3:13 PM   [Media Unavailable] Controlled Substance Agreement - Opioid - Scan on 10/24/2019: OUTSIDE RECORD     Who prescribed the medication?: unknown  Do you need a refill? Yes  Patient offered an appointment? No  Do you have any questions or concerns?  Yes: Needing to have one on hand      Could we send this information to you in [facility] or would you prefer to receive a phone call?:   Patient would prefer a phone call   Okay to leave a detailed message?: Yes at Cell number on file:    Telephone Information:   Mobile [phone]

## 2024-08-15 ENCOUNTER — OFFICE VISIT (OUTPATIENT)
Dept: NURSING | Facility: CLINIC | Age: 64
End: 2024-08-15
Payer: COMMERCIAL

## 2024-08-15 ENCOUNTER — LAB (OUTPATIENT)
Dept: LAB | Facility: CLINIC | Age: 64
End: 2024-08-15
Payer: COMMERCIAL

## 2024-08-15 ENCOUNTER — ANCILLARY PROCEDURE (OUTPATIENT)
Dept: CARDIOLOGY | Facility: CLINIC | Age: 64
End: 2024-08-15
Payer: COMMERCIAL

## 2024-08-15 ENCOUNTER — ANCILLARY PROCEDURE (OUTPATIENT)
Dept: ULTRASOUND IMAGING | Facility: CLINIC | Age: 64
End: 2024-08-15
Payer: COMMERCIAL

## 2024-08-15 ENCOUNTER — PATIENT OUTREACH (OUTPATIENT)
Dept: ONCOLOGY | Facility: CLINIC | Age: 64
End: 2024-08-15

## 2024-08-15 VITALS — OXYGEN SATURATION: 96 % | HEART RATE: 112 BPM

## 2024-08-15 DIAGNOSIS — C83.30 DIFFUSE LARGE B-CELL LYMPHOMA, UNSPECIFIED BODY REGION (H): ICD-10-CM

## 2024-08-15 DIAGNOSIS — R06.09 DOE (DYSPNEA ON EXERTION): ICD-10-CM

## 2024-08-15 DIAGNOSIS — R60.0 LEG EDEMA, RIGHT: ICD-10-CM

## 2024-08-15 DIAGNOSIS — J45.20 MILD INTERMITTENT ASTHMA WITHOUT COMPLICATION: Primary | ICD-10-CM

## 2024-08-15 LAB
GGT SERPL-CCNC: 54 U/L (ref 5–36)
LVEF ECHO: NORMAL

## 2024-08-15 PROCEDURE — 94726 PLETHYSMOGRAPHY LUNG VOLUMES: CPT | Performed by: INTERNAL MEDICINE

## 2024-08-15 PROCEDURE — 93356 MYOCRD STRAIN IMG SPCKL TRCK: CPT | Performed by: INTERNAL MEDICINE

## 2024-08-15 PROCEDURE — 94729 DIFFUSING CAPACITY: CPT | Performed by: INTERNAL MEDICINE

## 2024-08-15 PROCEDURE — 36415 COLL VENOUS BLD VENIPUNCTURE: CPT

## 2024-08-15 PROCEDURE — 93306 TTE W/DOPPLER COMPLETE: CPT | Performed by: INTERNAL MEDICINE

## 2024-08-15 PROCEDURE — 82977 ASSAY OF GGT: CPT

## 2024-08-15 PROCEDURE — 94060 EVALUATION OF WHEEZING: CPT | Performed by: INTERNAL MEDICINE

## 2024-08-15 PROCEDURE — 93971 EXTREMITY STUDY: CPT | Mod: RT | Performed by: RADIOLOGY

## 2024-08-15 NOTE — CONFIDENTIAL NOTE
Potassium chloride Refill   Last prescribing provider: DR Sauceda     Last clinic visit date: 7/31/24 Dr sauceda     Recommendations for requested medication (if none, N/A): Copied from chart note   KLOR-CON 20 MEQ CR tablet, TAKE ONE TABLET BY MOUTH TWICE DAILY, Disp: 60 tablet, Rfl: 0     Any other pertinent information (if none, N/A): N/A    Refilled: Y/N, if NO, why?

## 2024-08-15 NOTE — PROGRESS NOTES
[facility]: Cancer Care Short Note                                                                                          RNCC sent pt's 7/31 progress note with Dr. Sauceda via mail to verified home address.     Nicci Malcolm, RN, MSN, OCN   RN Care Coordinator   [facility] [address] [id number] [phone]

## 2024-08-16 LAB
DLCOUNC-%PRED-PRE: 136 %
DLCOUNC-PRE: 25.69 ML/MIN/MMHG
DLCOUNC-PRED: 18.83 ML/MIN/MMHG
ERV-%PRED-PRE: 9 %
ERV-PRE: 0.1 L
ERV-PRED: 1 L
EXPTIME-PRE: 5.34 SEC
FEF2575-%PRED-POST: 165 %
FEF2575-%PRED-PRE: 102 %
FEF2575-POST: 3.23 L/SEC
FEF2575-PRE: 2 L/SEC
FEF2575-PRED: 1.94 L/SEC
FEFMAX-%PRED-PRE: 150 %
FEFMAX-PRE: 8.82 L/SEC
FEFMAX-PRED: 5.88 L/SEC
FEV1-%PRED-PRE: 91 %
FEV1-PRE: 1.96 L
FEV1FEV6-PRE: 81 %
FEV1FEV6-PRED: 80 %
FEV1FVC-PRE: 81 %
FEV1FVC-PRED: 80 %
FEV1SVC-PRE: 71 %
FEV1SVC-PRED: 70 %
FIFMAX-PRE: 5.29 L/SEC
FRCPLETH-%PRED-PRE: 79 %
FRCPLETH-PRE: 2.08 L
FRCPLETH-PRED: 2.63 L
FVC-%PRED-PRE: 90 %
FVC-PRE: 2.41 L
FVC-PRED: 2.68 L
IC-%PRED-PRE: 131 %
IC-PRE: 2.64 L
IC-PRED: 2.01 L
RVPLETH-%PRED-PRE: 103 %
RVPLETH-PRE: 1.98 L
RVPLETH-PRED: 1.91 L
TLCPLETH-%PRED-PRE: 99 %
TLCPLETH-PRE: 4.73 L
TLCPLETH-PRED: 4.73 L
VA-%PRED-PRE: 94 %
VA-PRE: 4.2 L
VC-%PRED-PRE: 89 %
VC-PRE: 2.74 L
VC-PRED: 3.05 L

## 2024-08-19 RX ORDER — FLUTICASONE PROPIONATE 50 MCG
1-2 SPRAY, SUSPENSION (ML) NASAL DAILY
Qty: 16 G | Refills: 5 | Status: SHIPPED | OUTPATIENT
Start: 2024-08-19

## 2024-08-19 RX ORDER — POTASSIUM CHLORIDE 1500 MG/1
20 TABLET, EXTENDED RELEASE ORAL 2 TIMES DAILY
Qty: 60 TABLET | Refills: 0 | Status: SHIPPED | OUTPATIENT
Start: 2024-08-19 | End: 2024-09-19

## 2024-08-19 RX ORDER — EPINEPHRINE 0.3 MG/.3ML
0.3 INJECTION SUBCUTANEOUS ONCE
Qty: 2 EACH | Refills: 5 | Status: SHIPPED | OUTPATIENT
Start: 2024-08-19 | End: 2024-08-19

## 2024-08-19 RX ORDER — OLOPATADINE HYDROCHLORIDE 1 MG/ML
1 SOLUTION/ DROPS OPHTHALMIC 2 TIMES DAILY
Qty: 5 ML | Refills: 11 | Status: SHIPPED | OUTPATIENT
Start: 2024-08-19

## 2024-08-22 ENCOUNTER — PATIENT OUTREACH (OUTPATIENT)
Dept: ONCOLOGY | Facility: CLINIC | Age: 64
End: 2024-08-22
Payer: COMMERCIAL

## 2024-08-22 NOTE — PROGRESS NOTES
[facility]: Cancer Care Short Note                                    Discussion with Patient:                                                        OUTBOUND CALL: RNCC called patient -- MD reviewed the results and no concerning/acute findings. Recommend following up with PCP re her symptoms. Plan to see pt back in 1 year on 7/30/2025 and sooner if she has concerning symptoms.     Patient verbalizes understanding of POC and has no other questions or concerns at this time.     Nicci Malcolm, RN, MSN, OCN   RN Care Coordinator   [facility] [address] [address] [phone]         Nicci Malcolm, RN, MSN, OCN   RN Care Coordinator   [facility] [address] [address] [phone]

## 2024-09-19 DIAGNOSIS — E87.6 HYPOKALEMIA: ICD-10-CM

## 2024-09-19 NOTE — TELEPHONE ENCOUNTER
Potassium chlroide raza ER 20mEq tab  Last prescribing provider: Dr. Sauceda    Last clinic visit date: 7/31/24    Recommendations for requested medication (if none, N/A): NA    Any other pertinent information (if none, N/A): fax request    Refilled: Y/N, if NO, why?

## 2024-09-20 RX ORDER — POTASSIUM CHLORIDE 1500 MG/1
20 TABLET, EXTENDED RELEASE ORAL 2 TIMES DAILY
Qty: 60 TABLET | Refills: 0 | Status: SHIPPED | OUTPATIENT
Start: 2024-09-20

## 2024-09-23 ENCOUNTER — OFFICE VISIT (OUTPATIENT)
Dept: PALLIATIVE MEDICINE | Facility: OTHER | Age: 64
End: 2024-09-23
Payer: COMMERCIAL

## 2024-09-23 VITALS — SYSTOLIC BLOOD PRESSURE: 142 MMHG | HEART RATE: 101 BPM | DIASTOLIC BLOOD PRESSURE: 86 MMHG | OXYGEN SATURATION: 93 %

## 2024-09-23 DIAGNOSIS — G62.0 DRUG-INDUCED POLYNEUROPATHY (H): ICD-10-CM

## 2024-09-23 DIAGNOSIS — J45.20 MILD INTERMITTENT ASTHMA WITHOUT COMPLICATION: ICD-10-CM

## 2024-09-23 DIAGNOSIS — G89.4 CHRONIC PAIN SYNDROME: ICD-10-CM

## 2024-09-23 PROCEDURE — G0463 HOSPITAL OUTPT CLINIC VISIT: HCPCS | Performed by: ANESTHESIOLOGY

## 2024-09-23 PROCEDURE — 99213 OFFICE O/P EST LOW 20 MIN: CPT | Performed by: ANESTHESIOLOGY

## 2024-09-23 RX ORDER — OXYCODONE HYDROCHLORIDE 30 MG/1
30 TABLET ORAL
Qty: 84 TABLET | Refills: 0 | Status: SHIPPED | OUTPATIENT
Start: 2024-09-23 | End: 2024-09-30

## 2024-09-23 RX ORDER — KETAMINE HCL 100 %
POWDER (GRAM) MISCELLANEOUS
Qty: 90 G | Refills: 3 | Status: SHIPPED | OUTPATIENT
Start: 2024-09-23

## 2024-09-23 RX ORDER — ALBUTEROL SULFATE 90 UG/1
1-2 AEROSOL, METERED RESPIRATORY (INHALATION) EVERY 4 HOURS PRN
Qty: 8.5 G | Refills: 0 | OUTPATIENT
Start: 2024-09-23

## 2024-09-23 ASSESSMENT — PAIN SCALES - GENERAL: PAINLEVEL: MILD PAIN (3)

## 2024-09-23 NOTE — PROGRESS NOTES
Follow-up for peripheral neuropathy, also subsequently cervical fracture and lumbar compression fractures.                          [facility] Follow-up    Date of visit: 9/23/2024    Chief complaint:   Chief Complaint   Patient presents with    Pain       Interval history:    Chart review working with oncology concern for recurrence of lymphoma after shortness of breath negative for new signs of recurrence.    She reviews today her blood pressure is doing better.  Has not a problem at times.  Last time she took the medication became lightheaded fell with a back fracture.    Describes more recently had a sudden onset of shortness of breath or wheezing or chest pain that lasted 2 hours.  Resolved and occurred a week later.  She called her oncologist disease with similar symptoms for her past lymphoma.  CT show there were 2 smaller spots of lymph nodes where she had the previous cancer but they were not large enough for a biopsy and is to just have yearly monitoring.    No episodes since.  No information about the cause.  When last seen she was working with neurosurgery regarding the compression   Fractures, did not have been obtained.    Continues working with dentist, needing to have teeth removed and implants.    She has not been able to find work, in part related to concerns about the dentures and wanting to get that addressed and then may look into work.    She does continue with the oxycodone 30 mg every 4 hours.  She lox to help with the grandchildren visit.  Is not having back concern with the issue about opioid-induced hyperalgesia.    Using the ketamine occasionally, last was a week ago.  Would like to refill the ketamine topical cream which she has used before with some benefit for the neuropathy on her feet.    Reviewed some family dynamics.    Last urine drug test in July.   reviewed            Medications:  Current Outpatient Medications   Medication Sig Dispense Refill     ACAI RAMIREZ PO Take 50 mg by mouth      acetaminophen (TYLENOL) 325 MG tablet Take 2 tablets (650 mg) by mouth every 4 hours as needed for mild pain or fever 100 tablet     albuterol (VENTOLIN HFA) 108 (90 Base) MCG/ACT inhaler Inhale 1-2 puffs into the lungs every 4 hours as needed for shortness of breath or wheezing 18 g 3    calcitRIOL (ROCALTROL) 0.25 MCG capsule TAKE TWO CAPSULES BY MOUTH IN THE MORNING AND TAKE ONE CAPSULE BY MOUTH IN THE EVENING. 270 capsule 4    calcium citrate-vitamin D (CALCIUM CITRATE + D3) 315-250 MG-UNIT TABS per tablet Take 2 tablets by mouth 2 times daily 60 tablet 2    celecoxib (CELEBREX) 200 MG capsule Take 1 capsule (200 mg) by mouth TWO times daily 60 capsule 3    cetirizine (ZYRTEC ALLERGY) 10 MG tablet Take 1 tablet (10 mg) by mouth 3 times daily On hold for lab test. 90 tablet 0    Cholecalciferol (VITAMIN D3) 50 MCG (2000 UT) CAPS Take 2,000 Units by mouth daily 90 capsule 1    cromolyn (OPTICROM) 4 % ophthalmic solution Place 1 drop into both eyes 4 times daily 10 mL 0    cromolyn sodium (NASALCROM) 5.2 MG/ACT nasal aerosol SPRAY ONE SPRAY( 1 ML) IN NOSTRIL DAILY 1 mL 3    cyclobenzaprine (FLEXERIL) 10 MG tablet Take 1 tablet (10 mg) by mouth 4 times daily This is a 90 day supply 360 tablet 1    diazepam (VALIUM) 5 MG tablet Take 1 tablet (5 mg) by mouth once as needed for anxiety (take 1 tab 30 to 60 mins prior to MRI. take 2nd tab at time of MRI if needed.) 2 tablet 0    diclofenac (VOLTAREN) 1 % topical gel Apply affected area two times daily as needed using enclosed dosing card. (2-4 grams to chest wall) 100 g 1    famotidine (PEPCID) 20 MG tablet Take 1 tablet (20 mg) by mouth 2 times daily This is a 90 day supply 180 tablet 1    fluconazole (DIFLUCAN) 150 MG tablet Take 1 tablet (150 mg) by mouth once as needed (yeast infection symptoms) Repeat in 5 days if still having symptoms. 2 tablet 0    fluticasone (FLONASE) 50 MCG/ACT nasal spray Spray 1-2 sprays into both  nostrils daily 16 g 5    hydrOXYzine HCl (ATARAX) 25 MG tablet One tab 3-4 times a day to augment oxycodone 270 tablet 3    ketamine HCl POWD Ketamine 10% and lidocaine 10% in topical cream, apply to feet tid , 90 gm tube 90 g 3    ketamine HCl POWD 120 mg lozenge one-half to one lozenge up to 4 times a day as needed, #120 120 g 3    levothyroxine (SYNTHROID/LEVOTHROID) 112 MCG tablet Take 2 tablets (224 mcg) by mouth daily 180 tablet 2    lidocaine (LIDODERM) 5 % patch Place 1 patch onto the skin every 24 hours To prevent lidocaine toxicity, patient should be patch free for 12 hrs daily. 30 patch 2    lidocaine (XYLOCAINE) 5 % external ointment Apply quarter size amount to chest and back up to 3 times daily as needed for pain. 350 g 1    lidocaine-prilocaine (EMLA) cream Apply topically as needed for moderate pain 60 g 1    lisinopril-hydrochlorothiazide (ZESTORETIC) 10-12.5 MG tablet Take 1 tablet by mouth daily 90 tablet 1    magnesium oxide (MAG-OX) 400 MG tablet Take 1 tablet (400 mg) by mouth 3 times daily 90 tablet 4    Menaquinone-7 (VITAMIN K2) 100 MCG CAPS Take 1 capsule by mouth 2 times daily 60 capsule 3    montelukast (SINGULAIR) 10 MG tablet Take 2 tablets (20 mg) by mouth 2 times daily TAKE TWO TABLETS BY MOUTH TWICE DAILY Strength: 10 mg 120 tablet 11    naloxegol (MOVANTIK) 25 MG TABS tablet Take 1 tablet (25 mg) by mouth every morning (before breakfast) 30 tablet 3    naloxone (NARCAN) nasal spray Spray 1 spray (4 mg) into one nostril alternating nostrils as needed for opioid reversal every 2-3 minutes until assistance arrives 0.2 mL 0    olopatadine (PATANOL) 0.1 % ophthalmic solution Place 1 drop into both eyes 2 times daily 5 mL 11    omeprazole (PRILOSEC) 10 MG DR capsule Take 2 capsules (20 mg) by mouth daily 30 capsule 1    ondansetron (ZOFRAN ODT) 8 MG ODT tab DISSOLVE ONE TABLET IN MOUTH EVERY EIGHT HOURS AS NEEDED FOR NAUSEA Strength: 8 mg 30 tablet 0    oxyCODONE IR (ROXICODONE) 30 MG  tablet Take 1 tablet (30 mg) by mouth 6 times daily. Fill 9/17/24 for 9/18/24(14 day RX) 84 tablet 0    oxyCODONE-acetaminophen (PERCOCET) 5-325 MG tablet Take 1 tablet by mouth every 6 hours as needed for pain      potassium chloride raza ER (KLOR-CON M20) 20 MEQ CR tablet Take 1 tablet (20 mEq) by mouth 2 times daily. 60 tablet 0    Probiotic Product (PROBIOTIC DAILY PO) Take 1 capsule by mouth daily Lacto acid bifidobacterium      ranitidine (ZANTAC) 75 MG tablet Take 1 tablet (75 mg) by mouth 3 times daily 90 tablet 3    SUMAtriptan (IMITREX) 50 MG tablet Take 1 tablet by mouth as needed for migraine . May repeat dose in 2 hours as needed. Maximum dose 4 tablets in 24 hours 15 tablet 11    tobramycin (TOBREX) 0.3 % ophthalmic solution ADMINISTER 1 2 DROPS TO THE RIGHT EYE EVERY 4 (FOUR) HOURS FOR 7 DAYS.      tobramycin-dexamethasone (TOBRADEX) 0.3-0.1 % ophthalmic suspension INSTILL ONE DROP INTO BOTH EYES THREE TIMES DAILY      triamcinolone (KENALOG) 0.025 % ointment Apply topically as needed  3    Triamcinolone Acetonide (AZMACORT IN) Inhale 2 puffs into the lungs as needed      UNABLE TO FIND Take 2 capsules by mouth 3 times daily Muscle Mag. 2 caps contain B1 20mg, B2 20mg, B6 10mg, magesium 20mg, manganese 2mg.      UNABLE TO FIND 3 tablets 3 times daily MEDICATION NAME: calcium D-Glucarate   3 caps contain 180mg of elemental calcium.      UNABLE TO FIND 1 tablet daily MEDICATION NAME: Pure Encapsulations      vitamin D3 (CHOLECALCIFEROL) 50 mcg (2000 units) tablet Take 1 tablet (50 mcg) by mouth daily 30 tablet 2           Physical Exam:  Blood pressure (!) 142/86, pulse 101, SpO2 93%, not currently breastfeeding.    Alert, clear sensorium, no respiratory distress, no pain behavior.    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    Continues on this dose of oxycodone.  See chart indicated when efforts to taper if she is decompensated a level of function and pain  control such that her long-term psychotherapist accompanying her to advocate for this present regimen she has been able to do relatively better.  Feels ready to look for work after addressing her dentures.    Plan as above.    Total time 22 minutes         minutes spent on the date of encounter doing chart review, history, and exam documentation and further activities as noted above.     Thanh Berry MD  [facility]

## 2024-09-23 NOTE — PATIENT INSTRUCTIONS
[facility] Number:  [phone]  Call with any questions about your care and for scheduling assistance.   Calls are returned Monday through Friday between 8 AM and 4:30 PM. We usually get back to you within 2 business days depending on the issue/request.    If we are prescribing your medications:  For opioid medication refills, call the clinic or send a VisualXcriptt message 7 days in advance.  Please include:  Name of requested medication  Name of the pharmacy.  For non-opioid medications, call your pharmacy directly to request a refill. Please allow 3-4 days to be processed.   Per [place] State Law:  All controlled substance prescriptions must be filled within 30 days of being written.    For those controlled substances allowing refills, pickup must occur within 30 days of last fill.      We believe regular attendance is key to your success in our program!    Any time you are unable to keep your appointment we ask that you call us at least 24 hours in advance to cancel.This will allow us to offer the appointment time to another patient.   Multiple missed appointments may lead to dismissal from the clinic.     PLAN:    Continue the oxycodone 30 mg every 4 hours.    Renew the ketamine cream you may yet apply topically to your feet.    Ketamine lozenges 20 mg 1/2-1 3 times a day as needed.    Follow-up with Dr. Berry for return visit in 3 months.

## 2024-09-30 DIAGNOSIS — G89.4 CHRONIC PAIN SYNDROME: ICD-10-CM

## 2024-09-30 RX ORDER — OXYCODONE HYDROCHLORIDE 30 MG/1
30 TABLET ORAL
Qty: 84 TABLET | Refills: 0 | Status: SHIPPED | OUTPATIENT
Start: 2024-09-30 | End: 2024-10-01

## 2024-09-30 NOTE — TELEPHONE ENCOUNTER
Pending Prescriptions:                       Disp   Refills    oxyCODONE IR (ROXICODONE) 30 MG tablet    84 tab*0            Sig: Take 1 tablet (30 mg) by mouth 6 times daily.           Fill 10/1 for 10/3(14 day RX)    [id number] - [place], [address]

## 2024-09-30 NOTE — TELEPHONE ENCOUNTER
[facility] Call Center    Phone Message    May a detailed message be left on voicemail: yes     Reason for Call: Medication Question or concern regarding medication   Prescription Clarification  Name of Medication:   oxyCODONE IR (ROXICODONE) 30 MG tablet       Prescribing Provider: Dr Berry   Pharmacy: [facility][id number] - [place], [place] - [address]     What on the order needs clarification? Please redirect medication to correct pharmacy. Med was sent to compounding pharmacy instead of pt primary pharmacy at [facility] in [place] . Please contact pt with any questions.       Action Taken: Other: [facility]    Travel Screening: Not Applicable     Date of Service:

## 2024-10-01 RX ORDER — OXYCODONE HYDROCHLORIDE 30 MG/1
30 TABLET ORAL
Qty: 84 TABLET | Refills: 0 | Status: SHIPPED | OUTPATIENT
Start: 2024-10-01

## 2024-10-01 NOTE — TELEPHONE ENCOUNTER
Pending Prescriptions:                       Disp   Refills    oxyCODONE IR (ROXICODONE) 30 MG tablet    84 tab*0            Sig: Take 1 tablet (30 mg) by mouth 6 times daily.           Fill 10/1 for 10/3(14 day RX)      [facility] #[id number] - [place], [address]

## 2024-10-01 NOTE — TELEPHONE ENCOUNTER
[facility] Call Center    Phone Message    May a detailed message be left on voicemail: yes     Reason for Call: Other: Patient called stating she requested her medication to be sent to [facility] #[id number] - [place], [place] - [address] but that it was sent to [facility][id number] - [place], [place] - [address] [place]   Patient expresses her frustration that this happened and states it needs to be corrected as soon as possible, patient requesting call back to discuss this.    Action Taken: Message routed to:  Other: [facility]    Travel Screening: Not Applicable     Date of Service:

## 2024-10-04 DIAGNOSIS — C85.10 HIGH GRADE B-CELL LYMPHOMA (H): ICD-10-CM

## 2024-10-07 RX ORDER — ONDANSETRON 8 MG/1
TABLET, ORALLY DISINTEGRATING ORAL
Qty: 30 TABLET | Refills: 0 | Status: SHIPPED | OUTPATIENT
Start: 2024-10-07 | End: 2024-10-08

## 2024-10-08 ENCOUNTER — VIRTUAL VISIT (OUTPATIENT)
Dept: FAMILY MEDICINE | Facility: CLINIC | Age: 64
End: 2024-10-08
Payer: COMMERCIAL

## 2024-10-08 DIAGNOSIS — C85.10 HIGH GRADE B-CELL LYMPHOMA (H): ICD-10-CM

## 2024-10-08 DIAGNOSIS — J45.20 MILD INTERMITTENT ASTHMA WITHOUT COMPLICATION: ICD-10-CM

## 2024-10-08 DIAGNOSIS — R07.89 ATYPICAL CHEST PAIN: Primary | ICD-10-CM

## 2024-10-08 DIAGNOSIS — G43.519 INTRACTABLE PERSISTENT MIGRAINE AURA WITHOUT CEREBRAL INFARCTION AND WITHOUT STATUS MIGRAINOSUS: ICD-10-CM

## 2024-10-08 PROCEDURE — G2211 COMPLEX E/M VISIT ADD ON: HCPCS | Mod: 95 | Performed by: FAMILY MEDICINE

## 2024-10-08 PROCEDURE — 99215 OFFICE O/P EST HI 40 MIN: CPT | Mod: 95 | Performed by: FAMILY MEDICINE

## 2024-10-08 RX ORDER — SUMATRIPTAN 50 MG/1
TABLET, FILM COATED ORAL
Qty: 15 TABLET | Refills: 11 | Status: SHIPPED | OUTPATIENT
Start: 2024-10-08

## 2024-10-08 RX ORDER — ONDANSETRON 8 MG/1
TABLET, ORALLY DISINTEGRATING ORAL
Qty: 30 TABLET | Refills: 11 | Status: SHIPPED | OUTPATIENT
Start: 2024-10-08

## 2024-10-08 RX ORDER — ALBUTEROL SULFATE 90 UG/1
1-2 INHALANT RESPIRATORY (INHALATION) EVERY 4 HOURS PRN
Qty: 18 G | Refills: 11 | Status: SHIPPED | OUTPATIENT
Start: 2024-10-08

## 2024-10-08 NOTE — PROGRESS NOTES
"Elvin is a 64 year old who is being evaluated via a billable video visit.    How would you like to obtain your AVS? MyChart  If the video visit is dropped, the invitation should be resent by: Text to cell phone: [phone]  Will anyone else be joining your video visit? No      Assessment & Plan       ICD-10-CM    1. Atypical chest pain  R07.89 Exercise Stress Test - Adult     omeprazole (PRILOSEC) 20 MG DR capsule      2. Mild intermittent asthma without complication  J45.20 albuterol (VENTOLIN HFA) 108 (90 Base) MCG/ACT inhaler      3. Intractable persistent migraine aura without cerebral infarction and without status migrainosus  G43.519 SUMAtriptan (IMITREX) 50 MG tablet      4. High grade B-cell lymphoma (H)  C85.10 ondansetron (ZOFRAN ODT) 8 MG ODT tab              The longitudinal plan of care for the diagnosis(es)/condition(s) as documented were addressed during this visit. Due to the added complexity in care, I will continue to support Elvin in the subsequent management and with ongoing continuity of care.     A total of 40 minutes spent face-to-face with greater than 50% of the time spent in counseling and coordinating cares of the issues above   BMI  Estimated body mass index is 34.12 kg/m  as calculated from the following:    Height as of 7/31/24: 1.61 m (5' 3.39\").    Weight as of 7/31/24: 88.5 kg (195 lb).   Weight management plan: Discussed healthy diet and exercise guidelines      There are no Patient Instructions on file for this visit.    Subjective   Elvin is a 64 year old, presenting for the following health issues:  Patient Request (Recent chest pain) and Recheck Medication (Would like to have 15 tablets of imitrex )      10/8/2024     2:50 PM   Additional Questions   Roomed by Nelly   Accompanied by none         10/8/2024     2:50 PM   Patient Reported Additional Medications   Patient reports taking the following new medications none     HPI     Imitrex  Zofran  Inhaler    Chest pain " consistent with previous NHL diagnosis  Shortness of breath   Leg swelling  tight band across chest  2 small nodes in center of chest - August - CT scan            Review of Systems  Constitutional, HEENT, cardiovascular, pulmonary, gi and gu systems are negative, except as otherwise noted.      Objective           Vitals:  No vitals were obtained today due to virtual visit.    Physical Exam   GENERAL: alert and no distress  EYES: Eyes grossly normal to inspection.  No discharge or erythema, or obvious scleral/conjunctival abnormalities.  RESP: No audible wheeze, cough, or visible cyanosis.    SKIN: Visible skin clear. No significant rash, abnormal pigmentation or lesions.  NEURO: Cranial nerves grossly intact.  Mentation and speech appropriate for age.  PSYCH: Appropriate affect, tone, and pace of words          Video-Visit Details    Type of service:  Video Visit   Originating Location (pt. Location): Home    Distant Location (provider location):  On-site  Platform used for Video Visit: Rylan  Signed Electronically by: Pablo Zarco MD

## 2024-10-31 DIAGNOSIS — E87.6 HYPOKALEMIA: ICD-10-CM

## 2024-10-31 RX ORDER — POTASSIUM CHLORIDE 1500 MG/1
20 TABLET, EXTENDED RELEASE ORAL 2 TIMES DAILY
Qty: 60 TABLET | Refills: 0 | Status: SHIPPED | OUTPATIENT
Start: 2024-10-31

## 2024-10-31 NOTE — TELEPHONE ENCOUNTER
Potassium chloride 20mEq tab  Last prescribing provider: Dr. Sauceda    Last clinic visit date: 7/31/24    Recommendations for requested medication (if none, N/A): NA    Any other pertinent information (if none, N/A): fax request    Refilled: Y/N, if NO, why?

## 2024-11-23 DIAGNOSIS — D89.42 IDIOPATHIC MAST CELL ACTIVATION SYNDROME (H): Chronic | ICD-10-CM

## 2024-11-24 RX ORDER — MONTELUKAST SODIUM 10 MG/1
2 TABLET ORAL 2 TIMES DAILY
Qty: 120 TABLET | Refills: 1 | Status: SHIPPED | OUTPATIENT
Start: 2024-11-24

## 2024-12-03 DIAGNOSIS — G89.4 CHRONIC PAIN SYNDROME: ICD-10-CM

## 2024-12-03 DIAGNOSIS — E87.6 HYPOKALEMIA: ICD-10-CM

## 2024-12-03 RX ORDER — CHOLECALCIFEROL (VITAMIN D3) 50 MCG
1 TABLET ORAL DAILY
Qty: 30 TABLET | Refills: 2 | Status: SHIPPED | OUTPATIENT
Start: 2024-12-03

## 2024-12-03 NOTE — TELEPHONE ENCOUNTER
Received fax request from [facility] #[id number] - [place], [place] - [address] for vitamin D3 (CHOLECALCIFEROL) 50 mcg (2000 units) tablet     Last refilled on 10/04/2024 per pharmacy     Pt last seen on 09/23/24  Next appt scheduled for 12/16/24    Will facilitate refill.

## 2024-12-03 NOTE — TELEPHONE ENCOUNTER
Medication requested: potassium chloride raza ER 20 MEQ CR tablet    Last prescribing provider: Garima Sauceda MD on 10/31/24    Last clinic visit date: 7/31/24 with Dr. Sauceda    Recommendations for requested medication (if none, N/A): NA    Any other pertinent information (if none, N/A): NA    Refilled: Y/N, if NO, why?    Pended and Routed to Garima Sauceda MD

## 2024-12-04 ENCOUNTER — TELEPHONE (OUTPATIENT)
Dept: PALLIATIVE MEDICINE | Facility: OTHER | Age: 64
End: 2024-12-04
Payer: COMMERCIAL

## 2024-12-04 DIAGNOSIS — G89.4 CHRONIC PAIN SYNDROME: ICD-10-CM

## 2024-12-04 RX ORDER — OXYCODONE HYDROCHLORIDE 30 MG/1
30 TABLET ORAL
Qty: 84 TABLET | Refills: 0 | Status: SHIPPED | OUTPATIENT
Start: 2024-12-04

## 2024-12-04 RX ORDER — POTASSIUM CHLORIDE 1500 MG/1
20 TABLET, EXTENDED RELEASE ORAL 2 TIMES DAILY
Qty: 60 TABLET | Refills: 0 | Status: SHIPPED | OUTPATIENT
Start: 2024-12-04

## 2024-12-04 NOTE — TELEPHONE ENCOUNTER
[facility] Call Center    Phone Message    May a detailed message be left on voicemail: yes     Reason for Call: Medication Refill Request    Has the patient contacted the pharmacy for the refill? Yes   Name of medication being requested:   oxyCODONE IR (ROXICODONE) 30 MG tablet       Provider who prescribed the medication:   Thanh Berry MD       Pharmacy:   [facility] #[id number] - [place], [place] - [address]     Date medication is needed: 12/9/2024       Action Taken: Message routed to:  Other: [facility]    Travel Screening: Not Applicable     Date of Service:

## 2024-12-16 ENCOUNTER — VIRTUAL VISIT (OUTPATIENT)
Dept: PALLIATIVE MEDICINE | Facility: OTHER | Age: 64
End: 2024-12-16
Attending: ANESTHESIOLOGY
Payer: COMMERCIAL

## 2024-12-16 DIAGNOSIS — D47.02 MAST CELL DISEASE, SYSTEMIC: Chronic | ICD-10-CM

## 2024-12-16 DIAGNOSIS — G89.4 CHRONIC PAIN SYNDROME: ICD-10-CM

## 2024-12-16 PROCEDURE — G2211 COMPLEX E/M VISIT ADD ON: HCPCS | Mod: 95 | Performed by: ANESTHESIOLOGY

## 2024-12-16 PROCEDURE — 99213 OFFICE O/P EST LOW 20 MIN: CPT | Mod: 95 | Performed by: ANESTHESIOLOGY

## 2024-12-16 RX ORDER — OXYCODONE HYDROCHLORIDE 30 MG/1
30 TABLET ORAL
Qty: 84 TABLET | Refills: 0 | Status: SHIPPED | OUTPATIENT
Start: 2024-12-16

## 2024-12-16 RX ORDER — MEMANTINE HYDROCHLORIDE 10 MG/1
TABLET ORAL
Qty: 11 TABLET | Refills: 0 | Status: SHIPPED | OUTPATIENT
Start: 2024-12-16

## 2024-12-16 RX ORDER — HYDROXYZINE HYDROCHLORIDE 25 MG/1
TABLET, FILM COATED ORAL
Qty: 270 TABLET | Refills: 3 | Status: SHIPPED | OUTPATIENT
Start: 2024-12-16

## 2024-12-16 ASSESSMENT — PAIN SCALES - GENERAL: PAINLEVEL_OUTOF10: MODERATE PAIN (5)

## 2024-12-16 NOTE — PROGRESS NOTES
[facility] Follow-up    Date of visit: 12/16/2024    Chief complaint:   Chief Complaint   Patient presents with    Pain       Seen for video visit follow-up for chemotherapy related peripheral neuropathy.    Review since last seen weather related changes affecting the back pain worse.    She has been followed by oncology, there is concern if there is a nodule too small to biopsy, level followed up in January.    Did have a cardiac echo doing okay.    Did have an episode of the shortness of breath when trying to lift a lawnmower and stressed out.  He was doing healer and some Vistaril and it resolved.    Continue working with getting her dental problems addressed.  Problems that she does not have good bone in her jaws for implants.  Also extremely expensive and insurance is not going to cover.  She has not been able to work makes it quite stressful.    Uses the ketamine as little as possible due to cost perhaps twice a week.    Does continue on the oxycodone 30 mg every 4 hours.  Is not able to afford the ketamine topical cream.    We did have a trial of amantadine to help with weather related changes, did not help.  We have not used the Namenda, reviewed also can be used off label to augment opioids.  Discussed possible side effects.    Last urine drug test in June.   reviewed            Medications:  Current Outpatient Medications   Medication Sig Dispense Refill    ACAI RAMIREZ PO Take 50 mg by mouth      acetaminophen (TYLENOL) 325 MG tablet Take 2 tablets (650 mg) by mouth every 4 hours as needed for mild pain or fever 100 tablet     albuterol (VENTOLIN HFA) 108 (90 Base) MCG/ACT inhaler Inhale 1-2 puffs into the lungs every 4 hours as needed for shortness of breath or wheezing. 18 g 11    calcitRIOL (ROCALTROL) 0.25 MCG capsule TAKE TWO CAPSULES BY MOUTH IN THE MORNING AND TAKE ONE CAPSULE BY MOUTH IN THE EVENING. 270 capsule 4    calcium citrate-vitamin  D (CALCIUM CITRATE + D3) 315-250 MG-UNIT TABS per tablet Take 2 tablets by mouth 2 times daily 60 tablet 2    celecoxib (CELEBREX) 200 MG capsule Take 1 capsule (200 mg) by mouth TWO times daily 60 capsule 3    cetirizine (ZYRTEC ALLERGY) 10 MG tablet Take 1 tablet (10 mg) by mouth 3 times daily On hold for lab test. 90 tablet 0    Cholecalciferol (VITAMIN D3) 50 MCG (2000 UT) CAPS Take 2,000 Units by mouth daily 90 capsule 1    cromolyn (OPTICROM) 4 % ophthalmic solution Place 1 drop into both eyes 4 times daily 10 mL 0    cromolyn sodium (NASALCROM) 5.2 MG/ACT nasal aerosol SPRAY ONE SPRAY( 1 ML) IN NOSTRIL DAILY 1 mL 3    cyclobenzaprine (FLEXERIL) 10 MG tablet Take 1 tablet (10 mg) by mouth 4 times daily This is a 90 day supply 360 tablet 1    diazepam (VALIUM) 5 MG tablet Take 1 tablet (5 mg) by mouth once as needed for anxiety (take 1 tab 30 to 60 mins prior to MRI. take 2nd tab at time of MRI if needed.) 2 tablet 0    diclofenac (VOLTAREN) 1 % topical gel Apply affected area two times daily as needed using enclosed dosing card. (2-4 grams to chest wall) 100 g 1    famotidine (PEPCID) 20 MG tablet Take 1 tablet (20 mg) by mouth 2 times daily This is a 90 day supply 180 tablet 1    fluconazole (DIFLUCAN) 150 MG tablet Take 1 tablet (150 mg) by mouth once as needed (yeast infection symptoms) Repeat in 5 days if still having symptoms. 2 tablet 0    fluticasone (FLONASE) 50 MCG/ACT nasal spray Spray 1-2 sprays into both nostrils daily 16 g 5    hydrOXYzine HCl (ATARAX) 25 MG tablet One tab 3-4 times a day to augment oxycodone 270 tablet 3    ketamine HCl POWD Ketamine 10% and lidocaine 10% in topical cream, apply to feet tid , 90 gm tube 90 g 3    ketamine HCl POWD 120 mg lozenge one-half to one lozenge up to 4 times a day as needed, #120 120 g 3    levothyroxine (SYNTHROID/LEVOTHROID) 112 MCG tablet Take 2 tablets (224 mcg) by mouth daily 180 tablet 0    lidocaine (LIDODERM) 5 % patch Place 1 patch onto the skin  every 24 hours To prevent lidocaine toxicity, patient should be patch free for 12 hrs daily. 30 patch 2    lidocaine (XYLOCAINE) 5 % external ointment Apply quarter size amount to chest and back up to 3 times daily as needed for pain. 350 g 1    lidocaine-prilocaine (EMLA) cream Apply topically as needed for moderate pain 60 g 1    lisinopril-hydrochlorothiazide (ZESTORETIC) 10-12.5 MG tablet Take 1 tablet by mouth daily 90 tablet 1    magnesium oxide (MAG-OX) 400 MG tablet Take 1 tablet (400 mg) by mouth 3 times daily 90 tablet 4    memantine (NAMENDA) 10 MG tablet One-half daily 5 day, one-half twice a day 5 days, then one twice a day 11 tablet 0    Menaquinone-7 (VITAMIN K2) 100 MCG CAPS Take 1 capsule by mouth 2 times daily 60 capsule 3    montelukast (SINGULAIR) 10 MG tablet TAKE TWO TABLETS BY MOUTH TWICE DAILY 120 tablet 1    naloxegol (MOVANTIK) 25 MG TABS tablet Take 1 tablet (25 mg) by mouth every morning (before breakfast) 30 tablet 3    naloxone (NARCAN) nasal spray Spray 1 spray (4 mg) into one nostril alternating nostrils as needed for opioid reversal every 2-3 minutes until assistance arrives 0.2 mL 0    olopatadine (PATANOL) 0.1 % ophthalmic solution Place 1 drop into both eyes 2 times daily 5 mL 11    omeprazole (PRILOSEC) 10 MG DR capsule Take 2 capsules (20 mg) by mouth daily 30 capsule 1    ondansetron (ZOFRAN ODT) 8 MG ODT tab DISSOLVE ONE TABLET IN MOUTH EVERY EIGHT HOURS AS NEEDED FOR NAUSEA 30 tablet 11    oxyCODONE IR (ROXICODONE) 30 MG tablet Take 1 tablet (30 mg) by mouth 6 times daily. Fill 12/23 for 12.26(14 day RX) 84 tablet 0    oxyCODONE-acetaminophen (PERCOCET) 5-325 MG tablet Take 1 tablet by mouth every 6 hours as needed for pain      potassium chloride raza ER (KLOR-CON M20) 20 MEQ CR tablet Take 1 tablet (20 mEq) by mouth 2 times daily. 60 tablet 0    Probiotic Product (PROBIOTIC DAILY PO) Take 1 capsule by mouth daily Lacto acid bifidobacterium      ranitidine (ZANTAC) 75 MG  tablet Take 1 tablet (75 mg) by mouth 3 times daily 90 tablet 3    SUMAtriptan (IMITREX) 50 MG tablet Take 1 tablet by mouth as needed for migraine . May repeat dose in 2 hours as needed. Maximum dose 4 tablets in 24 hours 15 tablet 11    tobramycin (TOBREX) 0.3 % ophthalmic solution ADMINISTER 1 2 DROPS TO THE RIGHT EYE EVERY 4 (FOUR) HOURS FOR 7 DAYS.      tobramycin-dexamethasone (TOBRADEX) 0.3-0.1 % ophthalmic suspension INSTILL ONE DROP INTO BOTH EYES THREE TIMES DAILY      triamcinolone (KENALOG) 0.025 % ointment Apply topically as needed  3    Triamcinolone Acetonide (AZMACORT IN) Inhale 2 puffs into the lungs as needed      UNABLE TO FIND Take 2 capsules by mouth 3 times daily Muscle Mag. 2 caps contain B1 20mg, B2 20mg, B6 10mg, magesium 20mg, manganese 2mg.      UNABLE TO FIND 3 tablets 3 times daily MEDICATION NAME: calcium D-Glucarate   3 caps contain 180mg of elemental calcium.      UNABLE TO FIND 1 tablet daily MEDICATION NAME: Pure Encapsulations      vitamin D3 (CHOLECALCIFEROL) 50 mcg (2000 units) tablet Take 1 tablet (50 mcg) by mouth daily. 30 tablet 2           Physical Exam:  not currently breastfeeding.    By video alert, clear sensorium, no respiratory distress, no pain behavior.        Assessment:   Chemotherapy related peripheral neuropathy.  Have tried a variety of neuropathic agent.  This opioid regimen has been most effective and tolerated.  Efforts to taper it has been poorly tolerated, she did have her long-term psychotherapist, and during that time to review the changes she had seen an advocate.    Plan: Off label use of Namenda to try to augment the opioids, as she is not able to afford the ketamine.    She will follow-up in the clinic in 3 months.    Total time 24 minutes, video start time 4: 04.  In 4: 21, at home via [email or web address] longitudinal plan of care for the diagnosis(es)/condition(s) as documented were addressed during this visit. Due to the added complexity in care, I will  continue to support Elvin in the subsequent management and with ongoing continuity of care.       minutes spent on the date of encounter doing chart review, history, and exam documentation and further activities as noted above.     Thanh Berry MD  [facility]

## 2024-12-16 NOTE — PROGRESS NOTES
[facility]      Tisha Arias is a 64 year old female who is being evaluated via a billable virtual visit.      VIDEO VISIT   How would you like to obtain your AVS? Mail a copy  If you are dropped from the video visit, the video invite should be resent to: Text to cell phone: [phone]  Will anyone else be joining your video visit? NO,  Is patient CURRENTLY in [place]? YES  If patient encounters technical issues they should call [phone]    Video-Visit Details  Type of service:  Video Visit  Originating Location (pt. Location): Home  Distant Location (provider location):  [facility]   Platform used for Video Visit: [employer]    CESAR/ALICIA     06.03.2024

## 2024-12-16 NOTE — PATIENT INSTRUCTIONS
PLAN;    Continue the oxycodone 30 mg every 4 hours.    Trial of Namenda to augment the opioids, 10 mg tablet, 1/2 tablet daily for 5 days, one half twice a day for 5 days, then 1 twice a day.  If tolerating we will switch to an extended release once a day.    Continue the ketamine lozenges as able.    Reviewed you are working with the dentist to come up with a plan.    Follow-up with Dr. Berry return visit in the clinic in 3 months

## 2024-12-22 DIAGNOSIS — G89.4 CHRONIC PAIN DISORDER: ICD-10-CM

## 2024-12-23 RX ORDER — CYCLOBENZAPRINE HCL 10 MG
10 TABLET ORAL 4 TIMES DAILY
Qty: 360 TABLET | Refills: 0 | Status: SHIPPED | OUTPATIENT
Start: 2024-12-23

## 2024-12-23 NOTE — TELEPHONE ENCOUNTER
Name from pharmacy: Cyclobenzaprine HCl Oral Tablet 10 MG         Will file in chart as: cyclobenzaprine (FLEXERIL) 10 MG tablet    Sig: Take 1 tablet (10 mg) by mouth 4 times daily This is a 90 day supply    Disp: 360 tablet    Refills: 0    Start: 12/22/2024    Class: E-Prescribe    Non-formulary For: Chronic pain disorder    Last ordered: 7 months ago (5/8/2024) by Thanh Berry MD    Last refill: 10/4/2024    Rx #: [id number]       To be filled at: [facility] #[id number] - [place], [address]

## 2024-12-23 NOTE — TELEPHONE ENCOUNTER
Received fax request from [facility] pharmacy requesting refill(s) for cyclobenzaprine (FLEXERIL) 10 MG tablet     Last refilled on 10/04/24    Pt last seen on 12/16/24  Next appt scheduled for 04/07/24    Will facilitate refill.

## 2024-12-29 NOTE — NURSING NOTE
"Oncology Rooming Note    November 28, 2018 7:01 AM   Tisha Arias is a 58 year old female who presents for:    Chief Complaint   Patient presents with     Port Draw     Labs drawn via PORT by RN. Line flushed with saline and heparin. VS taken.      Oncology Clinic Visit     [facility] RETURN- LYMPHOMA/ BREAST CA     Initial Vitals: /72 (BP Location: Left arm, Patient Position: Sitting, Cuff Size: Adult Regular)  Pulse 97  Temp 98.8  F (37.1  C) (Oral)  Resp 18  Wt 80.3 kg (177 lb)  SpO2 96%  BMI 29.45 kg/m2 Estimated body mass index is 29.45 kg/(m^2) as calculated from the following:    Height as of 11/26/18: 1.651 m (5' 5\").    Weight as of this encounter: 80.3 kg (177 lb). Body surface area is 1.92 meters squared.  Extreme Pain (8) Comment: chest and lower back nerve pain   No LMP recorded. Patient has had a hysterectomy.  Allergies reviewed: Yes  Medications reviewed: Yes    Medications: MEDICATION REFILLS NEEDED TODAY. Provider was notified.  Pharmacy name entered into [employer]:    [facility] #[id number] [place], [place] - [address] [address]. [facility] - [place], [place] - [address] [place] [address] AVE     Clinical concerns: Diazepam, morphine, oxycodone, and patient wants to see if she can get a compound cream with gabapentin in it. Nerve pain has increased and the cream she has currently isn't helping. Emmanuel was notified.    10 minutes for nursing intake (face to face time)     Corey Driver LPN            "
Chief Complaint   Patient presents with     Port Draw     Labs drawn via PORT by RN. Line flushed with saline and heparin. VS taken.      Gerri Andres RN    
Warm/Dry

## 2025-01-01 ENCOUNTER — MYC REFILL (OUTPATIENT)
Dept: PALLIATIVE MEDICINE | Facility: OTHER | Age: 65
End: 2025-01-01
Payer: COMMERCIAL

## 2025-01-01 DIAGNOSIS — G89.4 CHRONIC PAIN SYNDROME: ICD-10-CM

## 2025-01-02 RX ORDER — OXYCODONE HYDROCHLORIDE 30 MG/1
30 TABLET ORAL
Qty: 84 TABLET | Refills: 0 | Status: SHIPPED | OUTPATIENT
Start: 2025-01-02

## 2025-01-02 NOTE — TELEPHONE ENCOUNTER
Received call from patient requesting refill(s) oxyCODONE IR (ROXICODONE) 30 MG tablet    Last dispensed from pharmacy on 12/24/2024    Patient's last office/virtual visit by prescribing provider on 12/16/2024  Next office/virtual appointment scheduled for 04/07/2025    Last urine drug screen date 06/03/2024  Current opioid agreement on file (completed within the last year) Yes Date of opioid agreement: 06/03/82024    E-prescribe to   [facility] #[id number] - [place], [address], pharmacy    Will route to nursing [place] for review and preparation of prescription(s).     Nguyen Ann MA  [facility] [place]

## 2025-01-02 NOTE — TELEPHONE ENCOUNTER
Called pt to notify the remaining #64 tabs Oxycodone was sent to [facility] to be picked up anytime. Pt voiced understanding.   Discharge Diagnosis  Pelvic abscess in female  Diverticulitis of large intestine without bleeding    Issues Requiring Follow-Up  Follow-up with us in 1 week at below appointment regardless and call sooner if any issues    Test Results Pending At Discharge  Pending Labs       Order Current Status    Sterile Fluid Culture/Smear Preliminary result          Hospital Course  Patient presented to hospital with lower abdominal pain.  CT A/P demonstrated fluid collection in the pelvis.  Drainage was performed by interventional radiology 1/2.  60 cc of purulent fluid was removed.  She has a drain in place.  She is being discharged home with 10 days of antibiotics.  She has been instructed to follow-up with Dr. Madera in 1 month.     Pertinent Physical Exam At Time of Discharge  IR drain in place  See progress note for full exam     Home Medications     Medication List      START taking these medications     amoxicillin-pot clavulanate 875-125 mg tablet; Commonly known as:   Augmentin; Take 1 tablet (875 mg) by mouth 2 times a day for 10 days.   oxyCODONE 5 mg immediate release tablet; Commonly known as: Roxicodone;   Take 1 tablet (5 mg) by mouth every 6 hours if needed for moderate pain (4   - 6) or severe pain (7 - 10) for up to 3 days.     Outpatient Follow-Up  Future Appointments   Date Time Provider Department Center   1/10/2025  2:30 PM Rosette Madera MD [id number] BERTO Singh-CNP

## 2025-01-02 NOTE — TELEPHONE ENCOUNTER
Refills have been requested for the following medications:         oxyCODONE IR (ROXICODONE) 30 MG tablet [ADRI DIXON]      Patient Comment:  Monday/ Tuesday at [facility] from Pharmacy same as last refill     Preferred pharmacy: [facility] #[id number] - [place], [place] - [address]

## 2025-01-02 NOTE — TELEPHONE ENCOUNTER
Pending Prescriptions:                       Disp   Refills    oxyCODONE IR (ROXICODONE) 30 MG tablet    84 tab*0            Sig: Take 1 tablet (30 mg) by mouth 6 times daily.           Fill 01/08/25 for start 01/09/25 (14 day RX)

## 2025-01-06 NOTE — TELEPHONE ENCOUNTER
Review of the last script shows that the last Oxycodone script was to start 12/26, which would mean the start date would actually be 01/09.  Please advise if ok to fill tomorrow.

## 2025-01-06 NOTE — TELEPHONE ENCOUNTER
Sent [employer] message that she may fill 1/7 and start Oxycodone 1/9.  Call placed to [facility] to notify of the same.

## 2025-01-06 NOTE — TELEPHONE ENCOUNTER
[facility] Call Center    Phone Message    May a detailed message be left on voicemail: yes     Reason for Call: Medication Question or concern regarding medication   Prescription Clarification  Name of Medication: oxyCODONE IR (ROXICODONE) 30 MG tablet   Prescribing Provider: Jamal    Pharmacy: [facility] #[id number] - [place], [place] - [address]    What on the order needs clarification? Patient called and stated that her medication was sent for a fill date of 1/8, but should be fill date of 01/7 . Patient requested a call back once it is fixed so she is sure she can pick it up tomorrow 1/7      Action Taken: Message routed to:  Other: Pain     Travel Screening: Not Applicable     Date of Service:

## 2025-01-10 DIAGNOSIS — R07.89 ATYPICAL CHEST PAIN: ICD-10-CM

## 2025-01-25 ENCOUNTER — HEALTH MAINTENANCE LETTER (OUTPATIENT)
Age: 65
End: 2025-01-25

## 2025-01-27 DIAGNOSIS — C85.10 HIGH GRADE B-CELL LYMPHOMA (H): ICD-10-CM

## 2025-01-27 DIAGNOSIS — G89.4 CHRONIC PAIN SYNDROME: ICD-10-CM

## 2025-01-27 RX ORDER — OXYCODONE HYDROCHLORIDE 30 MG/1
30 TABLET ORAL
Qty: 84 TABLET | Refills: 0 | Status: SHIPPED | OUTPATIENT
Start: 2025-01-27

## 2025-01-27 RX ORDER — ONDANSETRON 8 MG/1
TABLET, ORALLY DISINTEGRATING ORAL
Qty: 30 TABLET | Refills: 0 | Status: SHIPPED | OUTPATIENT
Start: 2025-01-27

## 2025-01-27 NOTE — TELEPHONE ENCOUNTER
[facility] Call Center    Phone Message    May a detailed message be left on voicemail: yes     Reason for Call: Medication Refill Request    Has the patient contacted the pharmacy for the refill? Yes   Name of medication being requested: oxyCODONE IR (ROXICODONE) 30 MG tablet   Provider who prescribed the medication: Jamal   Pharmacy: [facility] #[id number] - [place], [place] - [address]   Date medication is needed: Tomorrow 1/28. Patient called in last week to renew, but was told it was already sent in. However patient then realized that the person confirming it was looking at the fill from 1/16. Patient is needing her refill tomorrow, but a refill request wasn't sent in due to the confusion.      Action Taken: Message routed to:  Other: Pain    Travel Screening: Not Applicable     Date of Service:

## 2025-02-10 DIAGNOSIS — C85.10 HIGH GRADE B-CELL LYMPHOMA (H): ICD-10-CM

## 2025-02-10 RX ORDER — ONDANSETRON 8 MG/1
TABLET, ORALLY DISINTEGRATING ORAL
Qty: 30 TABLET | Refills: 0 | Status: SHIPPED | OUTPATIENT
Start: 2025-02-10

## 2025-02-11 DIAGNOSIS — G89.4 CHRONIC PAIN SYNDROME: ICD-10-CM

## 2025-02-11 NOTE — TELEPHONE ENCOUNTER
[facility] Call Center    Phone Message    May a detailed message be left on voicemail: yes     Reason for Call: Medication Refill Request    Has the patient contacted the pharmacy for the refill? Yes   Name of medication being requested: oxyCODONE IR (ROXICODONE) 30 MG tablet   Provider who prescribed the medication: Thanh Berry MD   Pharmacy: [facility] #[id number] - [place], [place] - [address]    Date medication is needed: 2/18/25       Action Taken: Other: MPMW    Travel Screening: Not Applicable     Date of Service:

## 2025-02-11 NOTE — TELEPHONE ENCOUNTER
Received call from patient requesting refill(s) of oxyCODONE IR (ROXICODONE) 30 MG tablet     Last dispensed from pharmacy on: Feb. 5, 2025       Patient's last office/virtual visit by prescribing provider on: Dec. 16, 2024   Next office/virtual appointment scheduled for: Apr. 7, 2025       UDT: Laura. 3, 2024   CSA: Laura. 3, 2024       E-prescribe to [facility] #[id number] - [place], [address],    Will route to nursing [place] for review and preparation of prescription(s).

## 2025-02-12 RX ORDER — OXYCODONE HYDROCHLORIDE 30 MG/1
30 TABLET ORAL
Qty: 84 TABLET | Refills: 0 | Status: SHIPPED | OUTPATIENT
Start: 2025-02-12

## 2025-02-12 NOTE — TELEPHONE ENCOUNTER
Pending Prescriptions:                       Disp   Refills    oxyCODONE IR (ROXICODONE) 30 MG tablet    84 tab*0            Sig: Take 1 tablet (30 mg) by mouth 6 times daily.           Fill 02/19/25 for start 02/20/25 (14 day RX).   Cub

## 2025-02-17 DIAGNOSIS — G89.4 CHRONIC PAIN SYNDROME: ICD-10-CM

## 2025-02-17 RX ORDER — OXYCODONE HYDROCHLORIDE 30 MG/1
30 TABLET ORAL
Qty: 84 TABLET | Refills: 0 | Status: SHIPPED | OUTPATIENT
Start: 2025-02-17

## 2025-03-15 DIAGNOSIS — R07.89 ATYPICAL CHEST PAIN: ICD-10-CM

## 2025-03-18 RX ORDER — OMEPRAZOLE 20 MG/1
20 CAPSULE, DELAYED RELEASE ORAL 2 TIMES DAILY
Qty: 30 CAPSULE | Refills: 11 | Status: SHIPPED | OUTPATIENT
Start: 2025-03-18 | End: 2025-04-01

## 2025-03-22 DIAGNOSIS — R07.89 CHEST WALL PAIN: ICD-10-CM

## 2025-03-24 RX ORDER — CELECOXIB 200 MG/1
CAPSULE ORAL
Qty: 60 CAPSULE | Refills: 0 | Status: SHIPPED | OUTPATIENT
Start: 2025-03-24

## 2025-03-24 NOTE — TELEPHONE ENCOUNTER
Received call from patient  requesting refill(s) for celecoxib (CELEBREX) 200 MG capsule     Patient last seen on 12/16/24  Next appt scheduled for 04/07/25    E-prescribe to:  [facility] #[id number] - [place], [place] - [address]     Will facilitate refill.      Anali White MA  [facility] [place]

## 2025-03-24 NOTE — TELEPHONE ENCOUNTER
Name from pharmacy: Celecoxib Oral Capsule 200 MG         Will file in chart as: celecoxib (CELEBREX) 200 MG capsule    Sig: Take 1 capsule (200 mg) by mouth TWO times daily    Disp: 60 capsule    Refills: 0    Start: 3/22/2025    Class: E-Prescribe    Non-formulary For: Chest wall pain    Last ordered: 5 months ago (10/22/2024) by Thanh Berry MD    Last refill: 1/27/2025    Rx #: [id number]    NSAID Medications [id number]/22/2025 05:12 PM   Protocol Details Most recent blood pressure under 140/90 in past 12 months    Normal CBC on file in past 12 months    Always Fail Criteria - Chart Review Required    Patient is age 6-64 years    Medication is active on med list and the sig matches. RN to manually verify dose and sig if red X/fail.    Normal GFR on file in past 12 months    Recent (12 mo) or future (90 days) visit within the authorizing provider's specialty    No active pregnancy on record    No positive pregnancy test in past 12 months      To be filled at: [facility] #[id number] - [place], [place] - [address]

## 2025-03-26 DIAGNOSIS — G89.4 CHRONIC PAIN SYNDROME: ICD-10-CM

## 2025-03-26 RX ORDER — OXYCODONE HYDROCHLORIDE 30 MG/1
30 TABLET ORAL
Qty: 84 TABLET | Refills: 0 | Status: SHIPPED | OUTPATIENT
Start: 2025-03-26 | End: 2025-03-26

## 2025-03-26 RX ORDER — OXYCODONE HYDROCHLORIDE 30 MG/1
30 TABLET ORAL
Qty: 84 TABLET | Refills: 0 | Status: SHIPPED | OUTPATIENT
Start: 2025-03-26

## 2025-03-26 NOTE — TELEPHONE ENCOUNTER
[facility] Call Center    Phone Message    May a detailed message be left on voicemail: yes     Reason for Call: Medication Refill Request    Has the patient contacted the pharmacy for the refill? Yes   Name of medication being requested: oxyCODONE IR (ROXICODONE) 30 MG tablet   Provider who prescribed the medication: Jamal  Pharmacy: [facility] #[id number] - [place], [place] - [address]   Date medication is needed: 4/1      Action Taken: Message routed to:  Other: Pain    Travel Screening: Not Applicable     Date of Service:

## 2025-03-26 NOTE — TELEPHONE ENCOUNTER
Received request for a refill(s) of     oxyCODONE IR (ROXICODONE) 30 MG tablet        Last dispensed from pharmacy on 3/18/2025    Patient's last office/virtual visit by prescribing provider on 12/16/2024  Next office/virtual appointment scheduled for 4/7/2025    Last urine drug screen date 6/3/2024  Current opioid agreement on file (completed within the last year) YES Date of opioid agreement: 6/3/2024    E-prescribe to [facility] #[id number] - [place] [id number]   pharmacy    Will route to nursing [place] for review and preparation of prescription(s).

## 2025-03-26 NOTE — TELEPHONE ENCOUNTER
[facility] Call Center    Phone Message    May a detailed message be left on voicemail: yes     Reason for Call: Other: Pharmacy called in and stated that the patients script seems to be wrong. The dates are off. The patients last refill was on March 18th for 14 days and she would be due for a rill on April 1st not the 9th.      Action Taken: Message routed to:  Other: [facility]    Travel Screening: Not Applicable     Date of Service:

## 2025-03-30 DIAGNOSIS — J45.20 MILD INTERMITTENT ASTHMA WITHOUT COMPLICATION: ICD-10-CM

## 2025-03-31 RX ORDER — ALBUTEROL SULFATE 90 UG/1
INHALANT RESPIRATORY (INHALATION)
Qty: 8.5 G | Refills: 0 | OUTPATIENT
Start: 2025-03-31

## 2025-04-09 DIAGNOSIS — G89.4 CHRONIC PAIN DISORDER: ICD-10-CM

## 2025-04-09 DIAGNOSIS — S32.011D CLOSED STABLE BURST FRACTURE OF FIRST LUMBAR VERTEBRA WITH ROUTINE HEALING, SUBSEQUENT ENCOUNTER: ICD-10-CM

## 2025-04-09 DIAGNOSIS — G89.4 CHRONIC PAIN SYNDROME: ICD-10-CM

## 2025-04-09 DIAGNOSIS — R07.89 CHEST WALL PAIN: ICD-10-CM

## 2025-04-09 DIAGNOSIS — D47.02 MAST CELL DISEASE, SYSTEMIC: Chronic | ICD-10-CM

## 2025-04-09 DIAGNOSIS — G62.0 DRUG-INDUCED POLYNEUROPATHY: ICD-10-CM

## 2025-04-09 RX ORDER — HYDROXYZINE HYDROCHLORIDE 25 MG/1
TABLET, FILM COATED ORAL
Qty: 270 TABLET | Refills: 3 | Status: SHIPPED | OUTPATIENT
Start: 2025-04-09

## 2025-04-09 RX ORDER — OXYCODONE AND ACETAMINOPHEN 5; 325 MG/1; MG/1
1 TABLET ORAL EVERY 6 HOURS PRN
Status: CANCELLED | OUTPATIENT
Start: 2025-04-09

## 2025-04-09 RX ORDER — CYCLOBENZAPRINE HCL 10 MG
10 TABLET ORAL 4 TIMES DAILY
Qty: 360 TABLET | Refills: 0 | Status: SHIPPED | OUTPATIENT
Start: 2025-04-09

## 2025-04-09 RX ORDER — KETAMINE HCL 100 %
POWDER (GRAM) MISCELLANEOUS
Qty: 90 G | Refills: 3 | Status: SHIPPED | OUTPATIENT
Start: 2025-04-09

## 2025-04-09 RX ORDER — KETAMINE HCL 100 %
POWDER (GRAM) MISCELLANEOUS
Qty: 120 G | Refills: 3 | Status: SHIPPED | OUTPATIENT
Start: 2025-04-09

## 2025-04-09 RX ORDER — CELECOXIB 200 MG/1
CAPSULE ORAL
Qty: 60 CAPSULE | Refills: 0 | Status: SHIPPED | OUTPATIENT
Start: 2025-04-09

## 2025-04-09 RX ORDER — CHOLECALCIFEROL (VITAMIN D3) 50 MCG
1 TABLET ORAL DAILY
Qty: 30 TABLET | Refills: 2 | Status: SHIPPED | OUTPATIENT
Start: 2025-04-09

## 2025-04-09 RX ORDER — LIDOCAINE 50 MG/G
PATCH TOPICAL
Refills: 0 | OUTPATIENT
Start: 2025-04-09

## 2025-04-09 NOTE — TELEPHONE ENCOUNTER
Oxycodone 5 mg requested, I did not see where that had been prescribed recently, has been using oxycodone 30 mg tabs.  Did not show yesterday, now outside 3 months window, will be tapering oxycodone 30 mg

## 2025-04-09 NOTE — TELEPHONE ENCOUNTER
[facility] Call Center    Phone Message    May a detailed message be left on voicemail: yes     Reason for Call: Medication Refill Request    Has the patient contacted the pharmacy for the refill? Yes   Name of medication being requested: ketamine HCl POWD   Provider who prescribed the medication:   Thanh Berry MD       Pharmacy:   [place] [address] [address]      Date medication is needed: ASAP   Pt is out of this medication     Action Taken: Message routed to:  Other: [place] [facility]    Travel Screening: Not Applicable     Date of Service:

## 2025-04-09 NOTE — TELEPHONE ENCOUNTER
Received request for a refill(s) of   oxyCODONE-acetaminophen (PERCOCET) 5-325 MG tablet  cyclobenzaprine (FLEXERIL) 10 MG tablet  hydrOXYzine HCl (ATARAX) 25 MG tablet   celecoxib (CELEBREX) 200 MG capsule   vitamin D3 (CHOLECALCIFEROL) 50 mcg (2000 units) tablet    Last dispensed from pharmacy on     oxyCODONE-acetaminophen (PERCOCET) 5-325 MG tablet- 7/31/2024  cyclobenzaprine (FLEXERIL) 10 MG tablet  - 12/23/2024  hydrOXYzine HCl (ATARAX) 25 MG tablet - 12/16/2024   celecoxib (CELEBREX) 200 MG capsule - 3/24/2025   vitamin D3 (CHOLECALCIFEROL) 50 mcg (2000 units) tablet - 1/10/2025    Patient's last office/virtual visit by prescribing provider on 12/16/2024  Next office/virtual appointment scheduled for 7/18/2025    Last urine drug screen date 6/3/2024  Current opioid agreement on file (completed within the last year) YesDate of opioid agreement: 6/3/2024    E-prescribe to [facility] #[id number] - [place], [place] - [address]   pharmacy    Will route to nursing [place] for review and preparation of prescription(s).       [FreeTextEntry1] : Patient return c/o clogged ears. Has difficulty hearing from bilateral ears .  She started amoxicillin two days ago. b/l  ear pain .  She has been having discharge no fever

## 2025-04-09 NOTE — TELEPHONE ENCOUNTER
Received request for a refill(s) of ketamine HCl POWD      Last dispensed from pharmacy on 9/23/2024    Patient's last office/virtual visit by prescribing provider on 12/16/2024  Next office/virtual appointment scheduled for 7/18/2025    Last urine drug screen date 6/3/2024  Current opioid agreement on file (completed within the last year) YesDate of opioid agreement: 6/3/2024    E-prescribe to [facility] - [place], [place] - [facility] [address]   pharmacy    Will route to nursing [place] for review and preparation of prescription(s).

## 2025-04-09 NOTE — TELEPHONE ENCOUNTER
[facility] Call Center    Phone Message    May a detailed message be left on voicemail: yes     Reason for Call: Medication Refill Request    Has the patient contacted the pharmacy for the refill? Yes   Name of medication being requested:   celecoxib (CELEBREX) 200 MG capsule     hydrOXYzine HCl (ATARAX) 25 MG tablet       cyclobenzaprine (FLEXERIL) 10 MG tablet     vitamin D3 (CHOLECALCIFEROL) 50 mcg (2000 units) tablet   oxyCODONE IR (ROXICODONE) 30 MG tablet         Provider who prescribed the medication:   Thanh Berry MD       Pharmacy:   [id number] - [place], [place] - [address]     Date medication is needed: 4/15/2025      Action Taken: Message routed to:  Other: [place] [facility]    Travel Screening: Not Applicable     Date of Service:

## 2025-04-21 ENCOUNTER — TELEPHONE (OUTPATIENT)
Dept: PALLIATIVE MEDICINE | Facility: OTHER | Age: 65
End: 2025-04-21
Payer: COMMERCIAL

## 2025-05-01 ENCOUNTER — OFFICE VISIT (OUTPATIENT)
Dept: PALLIATIVE MEDICINE | Facility: OTHER | Age: 65
End: 2025-05-01
Payer: COMMERCIAL

## 2025-05-01 VITALS — DIASTOLIC BLOOD PRESSURE: 86 MMHG | SYSTOLIC BLOOD PRESSURE: 149 MMHG | OXYGEN SATURATION: 98 % | HEART RATE: 108 BPM

## 2025-05-01 DIAGNOSIS — G89.4 CHRONIC PAIN SYNDROME: ICD-10-CM

## 2025-05-01 DIAGNOSIS — Z79.891 LONG TERM (CURRENT) USE OF OPIATE ANALGESIC: Primary | ICD-10-CM

## 2025-05-01 DIAGNOSIS — S32.011D CLOSED STABLE BURST FRACTURE OF FIRST LUMBAR VERTEBRA WITH ROUTINE HEALING, SUBSEQUENT ENCOUNTER: ICD-10-CM

## 2025-05-01 LAB
CANNABINOIDS UR QL SCN: NORMAL
CREAT UR-MCNC: 173 MG/DL
ETHANOL UR QL SCN: NORMAL

## 2025-05-01 PROCEDURE — 80307 DRUG TEST PRSMV CHEM ANLYZR: CPT | Performed by: ANESTHESIOLOGY

## 2025-05-01 PROCEDURE — G0463 HOSPITAL OUTPT CLINIC VISIT: HCPCS | Performed by: ANESTHESIOLOGY

## 2025-05-01 RX ORDER — OXYCODONE HYDROCHLORIDE 30 MG/1
30 TABLET ORAL
Qty: 84 TABLET | Refills: 0 | Status: SHIPPED | OUTPATIENT
Start: 2025-05-01 | End: 2025-05-15

## 2025-05-01 RX ORDER — ASCORBIC ACID 125 MG
1 TABLET,CHEWABLE ORAL 2 TIMES DAILY
Qty: 60 CAPSULE | Refills: 3 | Status: SHIPPED | OUTPATIENT
Start: 2025-05-01

## 2025-05-01 ASSESSMENT — PAIN SCALES - GENERAL: PAINLEVEL_OUTOF10: SEVERE PAIN (7)

## 2025-05-01 NOTE — PROGRESS NOTES
Patient presents to the clinic today for a visit with ADRI DIXON MD regarding Pain Management.    UDS/CSA- 06.03.2024    Medications- Oxycodone this am    Ketamine last week    Notes    Ginette Ford  [facility] Clinical Assistant
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
## 2025-05-01 NOTE — PATIENT INSTRUCTIONS
"PLAN:    Discussed oxycodone presently 30 mg 5-1/2 tab daily, may resume on 5/6 at the 30 mg every 4 hours.    Continue lidocaine patches    Discussed \"C-15\" essential fatty acid literature suggests may help with fatty liver.  May obtain online through \"fatty 15\".    Follow-up with Dr. Berry in 3 months  "

## 2025-05-01 NOTE — LETTER

## 2025-05-05 ENCOUNTER — LAB (OUTPATIENT)
Dept: LAB | Facility: CLINIC | Age: 65
End: 2025-05-05
Payer: COMMERCIAL

## 2025-05-05 DIAGNOSIS — G89.4 CHRONIC PAIN SYNDROME: ICD-10-CM

## 2025-05-05 LAB
ALBUMIN SERPL BCG-MCNC: 4.1 G/DL (ref 3.5–5.2)
ALP SERPL-CCNC: 117 U/L (ref 40–150)
ALT SERPL W P-5'-P-CCNC: 17 U/L (ref 0–50)
ANION GAP SERPL CALCULATED.3IONS-SCNC: 12 MMOL/L (ref 7–15)
AST SERPL W P-5'-P-CCNC: 34 U/L (ref 0–45)
BASOPHILS # BLD AUTO: 0.1 10E3/UL (ref 0–0.2)
BASOPHILS NFR BLD AUTO: 1 %
BILIRUB DIRECT SERPL-MCNC: <0.08 MG/DL (ref 0–0.3)
BILIRUB SERPL-MCNC: <0.2 MG/DL
BUN SERPL-MCNC: 13.6 MG/DL (ref 8–23)
CALCIUM SERPL-MCNC: 8.8 MG/DL (ref 8.8–10.4)
CHLORIDE SERPL-SCNC: 103 MMOL/L (ref 98–107)
CREAT SERPL-MCNC: 0.81 MG/DL (ref 0.51–0.95)
EGFRCR SERPLBLD CKD-EPI 2021: 81 ML/MIN/1.73M2
EOSINOPHIL # BLD AUTO: 0.3 10E3/UL (ref 0–0.7)
EOSINOPHIL NFR BLD AUTO: 3 %
ERYTHROCYTE [DISTWIDTH] IN BLOOD BY AUTOMATED COUNT: 14.2 % (ref 10–15)
GLUCOSE SERPL-MCNC: 116 MG/DL (ref 70–99)
HCO3 SERPL-SCNC: 25 MMOL/L (ref 22–29)
HCT VFR BLD AUTO: 40.8 % (ref 35–47)
HGB BLD-MCNC: 12.9 G/DL (ref 11.7–15.7)
IMM GRANULOCYTES # BLD: 0 10E3/UL
IMM GRANULOCYTES NFR BLD: 0 %
LYMPHOCYTES # BLD AUTO: 4.4 10E3/UL (ref 0.8–5.3)
LYMPHOCYTES NFR BLD AUTO: 52 %
MCH RBC QN AUTO: 29.9 PG (ref 26.5–33)
MCHC RBC AUTO-ENTMCNC: 31.6 G/DL (ref 31.5–36.5)
MCV RBC AUTO: 95 FL (ref 78–100)
MONOCYTES # BLD AUTO: 1 10E3/UL (ref 0–1.3)
MONOCYTES NFR BLD AUTO: 12 %
NEUTROPHILS # BLD AUTO: 2.6 10E3/UL (ref 1.6–8.3)
NEUTROPHILS NFR BLD AUTO: 31 %
OXYCODONE UR CFM-MCNC: ABNORMAL NG/ML
OXYCODONE/CREAT UR: ABNORMAL
OXYMORPHONE UR CFM-MCNC: >7000 NG/ML
OXYMORPHONE/CREAT UR: ABNORMAL
PLATELET # BLD AUTO: 415 10E3/UL (ref 150–450)
POTASSIUM SERPL-SCNC: 4.3 MMOL/L (ref 3.4–5.3)
PROT SERPL-MCNC: 7.1 G/DL (ref 6.4–8.3)
RBC # BLD AUTO: 4.31 10E6/UL (ref 3.8–5.2)
SODIUM SERPL-SCNC: 140 MMOL/L (ref 135–145)
WBC # BLD AUTO: 8.3 10E3/UL (ref 4–11)

## 2025-05-05 PROCEDURE — 80053 COMPREHEN METABOLIC PANEL: CPT

## 2025-05-05 PROCEDURE — 36415 COLL VENOUS BLD VENIPUNCTURE: CPT

## 2025-05-05 PROCEDURE — 82248 BILIRUBIN DIRECT: CPT

## 2025-05-05 PROCEDURE — 85025 COMPLETE CBC W/AUTO DIFF WBC: CPT

## 2025-05-14 DIAGNOSIS — S32.011D CLOSED STABLE BURST FRACTURE OF FIRST LUMBAR VERTEBRA WITH ROUTINE HEALING, SUBSEQUENT ENCOUNTER: ICD-10-CM

## 2025-05-14 NOTE — TELEPHONE ENCOUNTER
[facility] Call Center    Phone Message    May a detailed message be left on voicemail: yes     Reason for Call: Medication Refill Request    Has the patient contacted the pharmacy for the refill? Yes   Name of medication being requested: oxyCODONE IR (ROXICODONE) 30 MG tablet   Provider who prescribed the medication: Thanh Berry MD   Pharmacy:   [id number] - [place], [place] - [address]     Date medication is needed: 5/20/2025       Action Taken: Message routed to:  Other: [facility]     Travel Screening: Not Applicable     Date of Service:

## 2025-05-15 RX ORDER — OXYCODONE HYDROCHLORIDE 30 MG/1
30 TABLET ORAL
Qty: 84 TABLET | Refills: 0 | Status: SHIPPED | OUTPATIENT
Start: 2025-05-15

## 2025-05-15 NOTE — TELEPHONE ENCOUNTER
Pending Prescriptions:                       Disp   Refills    oxyCODONE IR (ROXICODONE) 30 MG tablet    84 tab*0            Sig: Take 1 tablet (30 mg) by mouth 6 times daily. May           fill 5/20/25  Cub

## 2025-05-15 NOTE — TELEPHONE ENCOUNTER
Received call from patient requesting refill(s) of Oxycodone    Last dispensed from pharmacy on: May. 6, 2025       Patient's last office/virtual visit by prescribing provider on: May. 1, 2025   Next office/virtual appointment scheduled for: July. 15, 2025       UDT: May. 1, 2025   CSA: Laura. 3, 2024       E-prescribe to [facility] #[id number] - [place], [address],    Will route to nursing [place] for review and preparation of prescription(s).

## 2025-05-28 DIAGNOSIS — S32.011D CLOSED STABLE BURST FRACTURE OF FIRST LUMBAR VERTEBRA WITH ROUTINE HEALING, SUBSEQUENT ENCOUNTER: ICD-10-CM

## 2025-05-28 RX ORDER — OXYCODONE HYDROCHLORIDE 30 MG/1
30 TABLET ORAL
Qty: 84 TABLET | Refills: 0 | Status: SHIPPED | OUTPATIENT
Start: 2025-05-28

## 2025-05-28 NOTE — TELEPHONE ENCOUNTER
Correction: Last filled 5/20 for 14 days    Pending Prescriptions:                       Disp   Refills    oxyCODONE IR (ROXICODONE) 30 MG tablet    84 tab*0            Sig: Take 1 tablet (30 mg) by mouth 6 times daily. May           fill 6/2/25 for start 6/3/25

## 2025-05-28 NOTE — TELEPHONE ENCOUNTER
[facility] Call Center    Phone Message    May a detailed message be left on voicemail: yes     Reason for Call: Medication Refill Request    Has the patient contacted the pharmacy for the refill? Yes   Name of medication being requested: oxyCODONE IR (ROXICODONE) 30 MG tablet   Provider who prescribed the medication:     Thanh Berry MD     Pharmacy:   [id number] - [place], [place] - [address]     Date medication is needed: 6/3/2025       Action Taken: Other: [facility]     Travel Screening: Not Applicable     Date of Service:

## 2025-05-28 NOTE — TELEPHONE ENCOUNTER
Received call from patient requesting refill(s) of Oxycodone    Last dispensed from pharmacy on: Apr. 9, 2025     Patient's last office/virtual visit by prescribing provider on: May. 1, 2025  Next office/virtual appointment scheduled for: July, 2025      UDT: May. 1, 2025   CSA: Laura. 3, 2024     E-prescribe to [facility][id number] - [place], [address],    Will route to nursing [place] for review and preparation of prescription(s).

## 2025-06-11 DIAGNOSIS — S32.011D CLOSED STABLE BURST FRACTURE OF FIRST LUMBAR VERTEBRA WITH ROUTINE HEALING, SUBSEQUENT ENCOUNTER: ICD-10-CM

## 2025-06-11 NOTE — TELEPHONE ENCOUNTER
[facility] Call [facility]    Phone Message    May a detailed message be left on voicemail: yes     Reason for Call: Medication Refill Request    Has the patient contacted the pharmacy for the refill? Yes   Name of medication being requested: oxyCODONE IR (ROXICODONE) 30 MG tablet   Provider who prescribed the medication: Thanh Berry MD   Pharmacy: [facility][id number] - [place], [place] - [address]    Date medication is needed: 6/17/25       Action Taken: Message routed to:  Other: [facility]    Travel Screening: Not Applicable     Date of Service:

## 2025-06-11 NOTE — TELEPHONE ENCOUNTER
Received request for a refill(s) of oxyCODONE IR (ROXICODONE) 30 MG tablet      Last dispensed from pharmacy on 06/03/25    Patient's last office/virtual visit by prescribing provider on 05/01/25  Next office/virtual appointment scheduled for 07/15/25    Last urine drug screen date 05/01/25  Current opioid agreement on file (completed within the last year) Yes Date of opioid agreement: 05/01/25    E-prescribe to pharmacy-[facility][id number] - [place], [place] [address] [address]     Will route to nursing [place] for review and preparation of prescription(s).

## 2025-06-12 DIAGNOSIS — S32.011D CLOSED STABLE BURST FRACTURE OF FIRST LUMBAR VERTEBRA WITH ROUTINE HEALING, SUBSEQUENT ENCOUNTER: ICD-10-CM

## 2025-06-12 RX ORDER — OXYCODONE HYDROCHLORIDE 30 MG/1
30 TABLET ORAL
Qty: 84 TABLET | Refills: 0 | Status: SHIPPED | OUTPATIENT
Start: 2025-06-12

## 2025-06-12 NOTE — PROGRESS NOTES
[facility] pharmacies not working. Message to pt for preferred alternate. No response, script printed for pt to  in my absense

## 2025-06-16 RX ORDER — OXYCODONE HYDROCHLORIDE 30 MG/1
30 TABLET ORAL
Qty: 84 TABLET | Refills: 0 | Status: SHIPPED | OUTPATIENT
Start: 2025-06-16

## 2025-06-17 DIAGNOSIS — E89.0 POSTSURGICAL HYPOTHYROIDISM: ICD-10-CM

## 2025-06-17 DIAGNOSIS — E89.2 POSTSURGICAL HYPOPARATHYROIDISM: ICD-10-CM

## 2025-06-17 DIAGNOSIS — C73 PAPILLARY CARCINOMA, FOLLICULAR VARIANT (H): ICD-10-CM

## 2025-06-18 RX ORDER — LEVOTHYROXINE SODIUM 112 UG/1
224 TABLET ORAL DAILY
Qty: 180 TABLET | Refills: 0 | Status: SHIPPED | OUTPATIENT
Start: 2025-06-18

## 2025-07-12 DIAGNOSIS — G89.4 CHRONIC PAIN SYNDROME: ICD-10-CM

## 2025-07-14 RX ORDER — CHOLECALCIFEROL (VITAMIN D3) 50 MCG
1 TABLET ORAL DAILY
Qty: 30 TABLET | Refills: 0 | Status: SHIPPED | OUTPATIENT
Start: 2025-07-14

## 2025-07-14 NOTE — TELEPHONE ENCOUNTER
Requested Renewals     Name from pharmacy: Vitamin D3 Oral Tablet 50 MCG (2000 UT)         Will file in chart as: VITAMIN D3 50 MCG (2000 UT) tablet    Sig: Take 1 tablet (50 mcg) by mouth daily.    Disp: 30 tablet    Refills: 0    Start: 7/12/2025    Class: E-Prescribe    Non-formulary For: Chronic pain syndrome    Last ordered: 3 months ago (4/9/2025) by Thanh Berry MD    Last refill: 6/12/2025    Rx #: [id number]    Vitamin Supplements Protocol [id number]/12/2025 02:01 AM   Protocol Details Medication indicated for associated diagnosis    The patient does not have an order for high-dose vitamin D    Medication is active on med list and the sig matches. RN to manually verify dose and sig if red X/fail.    The patient is 2 years of age or older    Recent (12 month) or future (90 days) visit with authorizing provider's specialty (provided they have been seen in the past 15 months)      To be filled at: [facility][id number] - [place] [id number]

## 2025-07-14 NOTE — TELEPHONE ENCOUNTER
Received call from patient  requesting refill(s) for vitamin D3 (CHOLECALCIFEROL) 50 mcg (2000 units) tablet     Last refilled on 06/12/25    Patient last seen on 05/01/25  Next appt scheduled for 07/15/25    E-prescribe to:  [facility][id number] - [place], [place] - [address]     Will facilitate refill.      Anali White MA  [facility] [place]

## 2025-07-22 ENCOUNTER — TELEPHONE (OUTPATIENT)
Dept: PALLIATIVE MEDICINE | Facility: OTHER | Age: 65
End: 2025-07-22
Payer: COMMERCIAL

## 2025-07-24 ENCOUNTER — ALLIED HEALTH/NURSE VISIT (OUTPATIENT)
Dept: PALLIATIVE MEDICINE | Facility: OTHER | Age: 65
End: 2025-07-24
Attending: ANESTHESIOLOGY
Payer: COMMERCIAL

## 2025-07-24 ENCOUNTER — TELEPHONE (OUTPATIENT)
Dept: PALLIATIVE MEDICINE | Facility: OTHER | Age: 65
End: 2025-07-24

## 2025-07-24 DIAGNOSIS — S32.011D CLOSED STABLE BURST FRACTURE OF FIRST LUMBAR VERTEBRA WITH ROUTINE HEALING, SUBSEQUENT ENCOUNTER: ICD-10-CM

## 2025-07-24 NOTE — NURSING NOTE
Pt arrived at clinic for pill count.  Oxycodone 30 mg tablets #22 tablets counted by writer and Luana Gustafson RN Last filled 07/14 to start 07/15 (#84 tablets) for 14 days.   Quantity expected

## 2025-07-24 NOTE — TELEPHONE ENCOUNTER
[facility] Call [facility]    Phone Message    May a detailed message be left on voicemail: yes     Reason for Call: Medication Refill Request    Has the patient contacted the pharmacy for the refill? Yes   Name of medication being requested: oxyCODONE IR (ROXICODONE) 30 MG tablet    Provider who prescribed the medication: Thanh Berry  Pharmacy: [facility][id number] - [place], [place] - [address]  Date medication is needed: 7/29/2025    Action Taken: Message routed to:  Other: [place] [facility]    Travel Screening: Not Applicable     Date of Service:

## 2025-07-24 NOTE — TELEPHONE ENCOUNTER
Patient in clinic today, 07/24/25 for a pill count  Count is off by approximately 10 tabs to the deficit.  22 tabs remaining as of 7/24/25    Will sent up refill as due however provider to address any necessary changes needed at this time according to discrepancy of tablets remain      Pending Prescriptions:                       Disp   Refills    oxyCODONE IR (ROXICODONE) 30 MG tablet    84 tab*0            Sig: Take 1 tablet (30 mg) by mouth 6 times daily. May           fill 07/28/25 and start 07/29/25

## 2025-07-28 ENCOUNTER — TELEPHONE (OUTPATIENT)
Dept: PALLIATIVE MEDICINE | Facility: OTHER | Age: 65
End: 2025-07-28
Payer: COMMERCIAL

## 2025-07-28 DIAGNOSIS — R07.89 CHEST WALL PAIN: ICD-10-CM

## 2025-07-28 RX ORDER — OXYCODONE HYDROCHLORIDE 30 MG/1
30 TABLET ORAL
Qty: 84 TABLET | Refills: 0 | Status: SHIPPED | OUTPATIENT
Start: 2025-07-28

## 2025-07-28 NOTE — TELEPHONE ENCOUNTER
[facility] Call Center    Phone Message    May a detailed message be left on voicemail: yes     Reason for Call: Other: Patient call and would like the provider to give her a call. She is following up from her appointment last week. Please review and call back     Action Taken: Message routed to:  Other: [facility]    Travel Screening: Not Applicable     Date of Service:

## 2025-07-29 RX ORDER — CELECOXIB 200 MG/1
CAPSULE ORAL
Qty: 60 CAPSULE | Refills: 0 | Status: SHIPPED | OUTPATIENT
Start: 2025-07-29

## 2025-07-29 NOTE — TELEPHONE ENCOUNTER
Received fax request from [facility] pharmacy requesting refill(s) for celecoxib (CELEBREX) 200 MG capsule     Last refilled on 04/16/25    Pt last seen on 05/01/25  Next appt scheduled for 09/22/25    Will facilitate refill.

## 2025-07-29 NOTE — TELEPHONE ENCOUNTER
Called patient and LVM regarding call back request and follow up info. Requested patient call  if need be to get on wait list for a sooner appointment. Per MD, patient is to follow up and provide concerns at upcoming visit on 9/22.

## 2025-07-30 ENCOUNTER — PATIENT OUTREACH (OUTPATIENT)
Dept: ONCOLOGY | Facility: CLINIC | Age: 65
End: 2025-07-30
Payer: COMMERCIAL

## 2025-07-30 DIAGNOSIS — C83.30 DIFFUSE LARGE B-CELL LYMPHOMA, UNSPECIFIED BODY REGION (H): Primary | ICD-10-CM

## 2025-08-06 ENCOUNTER — PATIENT OUTREACH (OUTPATIENT)
Dept: ONCOLOGY | Facility: CLINIC | Age: 65
End: 2025-08-06
Payer: COMMERCIAL

## 2025-08-06 DIAGNOSIS — S32.011D CLOSED STABLE BURST FRACTURE OF FIRST LUMBAR VERTEBRA WITH ROUTINE HEALING, SUBSEQUENT ENCOUNTER: ICD-10-CM

## 2025-08-06 RX ORDER — OXYCODONE HYDROCHLORIDE 30 MG/1
30 TABLET ORAL
Qty: 84 TABLET | Refills: 0 | Status: SHIPPED | OUTPATIENT
Start: 2025-08-06

## 2025-08-10 DIAGNOSIS — G89.4 CHRONIC PAIN SYNDROME: ICD-10-CM

## 2025-08-11 RX ORDER — CHOLECALCIFEROL (VITAMIN D3) 50 MCG
1 TABLET ORAL DAILY
Qty: 30 TABLET | Refills: 0 | Status: SHIPPED | OUTPATIENT
Start: 2025-08-11

## 2025-08-13 DIAGNOSIS — G89.4 CHRONIC PAIN DISORDER: ICD-10-CM

## 2025-08-14 RX ORDER — CYCLOBENZAPRINE HCL 10 MG
10 TABLET ORAL 4 TIMES DAILY
Qty: 360 TABLET | Refills: 0 | Status: SHIPPED | OUTPATIENT
Start: 2025-08-14

## 2025-08-21 DIAGNOSIS — S32.011D CLOSED STABLE BURST FRACTURE OF FIRST LUMBAR VERTEBRA WITH ROUTINE HEALING, SUBSEQUENT ENCOUNTER: ICD-10-CM

## 2025-08-21 RX ORDER — OXYCODONE HYDROCHLORIDE 30 MG/1
30 TABLET ORAL
Qty: 84 TABLET | Refills: 0 | Status: SHIPPED | OUTPATIENT
Start: 2025-08-21

## 2025-10-23 NOTE — PROGRESS NOTES
This encounter is being administratively closed by Health Information Management as of 10/23/2025.

## (undated) DEVICE — SOL WATER INJ 2000ML BAG 07118-07

## (undated) DEVICE — Device

## (undated) DEVICE — SU MONOCRYL 4-0 P-3 18" UND Y494G

## (undated) DEVICE — DECANTER BAG 2002S

## (undated) DEVICE — ADH SKIN CLOSURE PREMIERPRO EXOFIN 1.0ML 3470

## (undated) DEVICE — LINEN GOWN XLG 5407

## (undated) DEVICE — GLOVE PROTEXIS POWDER FREE SMT 6.5  2D72PT65X

## (undated) DEVICE — ENDO CAP AND TUBING STERILE FOR ENDOGATOR  100130

## (undated) DEVICE — KIT INTRODUCER FLUENT MICRO 5FRX10CM ECHO TIP KIT-038-04

## (undated) DEVICE — SU DERMABOND ADVANCED .7ML DNX12

## (undated) DEVICE — KIT ENDO FIRST STEP DISINFECTANT 200ML W/POUCH EP-4

## (undated) DEVICE — SU VICRYL 3-0 SH 27" J316H

## (undated) DEVICE — KNIFE HANDLE W/15 BLADE 371615

## (undated) DEVICE — LINEN TOWEL PACK X5 5464

## (undated) DEVICE — TAPE CLOTH 3" CARDINAL 3TRCL03

## (undated) DEVICE — SOL WATER IRRIG 1000ML BOTTLE 2F7114

## (undated) DEVICE — SOL NACL 0.9% INJ 250ML BAG 2B1322Q

## (undated) DEVICE — SOL NACL 0.9% IRRIG 3000ML BAG 07972-08

## (undated) DEVICE — GLOVE PROTEXIS W/NEU-THERA 7.0  2D73TE70

## (undated) DEVICE — GLOVE PROTEXIS POWDER FREE SMT 7.0  2D72PT70X

## (undated) DEVICE — ENDO BITE BLOCK ADULT OMNI-BLOC

## (undated) DEVICE — GOWN XLG DISP 9545

## (undated) DEVICE — TUBING SUCTION 10'X3/16" N510

## (undated) DEVICE — ENDO TUBING CO2 SMARTCAP STERILE DISP 100145CO2EXT

## (undated) DEVICE — PACK CENTRAL LINE INSERTION SAN32CLFCG

## (undated) DEVICE — COVER ULTRASOUND PROBE W/GEL FLEXI-FEEL 6"X58" LF  25-FF658

## (undated) DEVICE — SUCTION MANIFOLD DORNOCH ULTRA CART UL-CL500

## (undated) RX ORDER — SODIUM CHLORIDE 9 MG/ML
INJECTION, SOLUTION INTRAVENOUS
Status: DISPENSED
Start: 2017-09-15

## (undated) RX ORDER — LIDOCAINE HYDROCHLORIDE 10 MG/ML
INJECTION, SOLUTION EPIDURAL; INFILTRATION; INTRACAUDAL; PERINEURAL
Status: DISPENSED
Start: 2017-12-08

## (undated) RX ORDER — LIDOCAINE HYDROCHLORIDE 10 MG/ML
INJECTION, SOLUTION EPIDURAL; INFILTRATION; INTRACAUDAL; PERINEURAL
Status: DISPENSED
Start: 2018-01-19

## (undated) RX ORDER — ONDANSETRON 2 MG/ML
INJECTION INTRAMUSCULAR; INTRAVENOUS
Status: DISPENSED
Start: 2017-10-04

## (undated) RX ORDER — FENTANYL CITRATE 50 UG/ML
INJECTION, SOLUTION INTRAMUSCULAR; INTRAVENOUS
Status: DISPENSED
Start: 2019-08-19

## (undated) RX ORDER — HEPARIN SODIUM (PORCINE) LOCK FLUSH IV SOLN 100 UNIT/ML 100 UNIT/ML
SOLUTION INTRAVENOUS
Status: DISPENSED
Start: 2019-07-01

## (undated) RX ORDER — CEFAZOLIN SODIUM 1 G/3ML
INJECTION, POWDER, FOR SOLUTION INTRAMUSCULAR; INTRAVENOUS
Status: DISPENSED
Start: 2019-08-19

## (undated) RX ORDER — FENTANYL CITRATE 50 UG/ML
INJECTION, SOLUTION INTRAMUSCULAR; INTRAVENOUS
Status: DISPENSED
Start: 2017-09-14

## (undated) RX ORDER — LIDOCAINE HYDROCHLORIDE 10 MG/ML
INJECTION, SOLUTION EPIDURAL; INFILTRATION; INTRACAUDAL; PERINEURAL
Status: DISPENSED
Start: 2017-10-30

## (undated) RX ORDER — LIDOCAINE HYDROCHLORIDE 10 MG/ML
INJECTION, SOLUTION EPIDURAL; INFILTRATION; INTRACAUDAL; PERINEURAL
Status: DISPENSED
Start: 2017-12-28

## (undated) RX ORDER — ACETAMINOPHEN 325 MG/1
TABLET ORAL
Status: DISPENSED
Start: 2019-11-11

## (undated) RX ORDER — ONDANSETRON 2 MG/ML
INJECTION INTRAMUSCULAR; INTRAVENOUS
Status: DISPENSED
Start: 2017-09-14

## (undated) RX ORDER — HYDROMORPHONE HYDROCHLORIDE 1 MG/ML
INJECTION, SOLUTION INTRAMUSCULAR; INTRAVENOUS; SUBCUTANEOUS
Status: DISPENSED
Start: 2017-09-14

## (undated) RX ORDER — ROCURONIUM BROMIDE 50 MG/5 ML
SYRINGE (ML) INTRAVENOUS
Status: DISPENSED
Start: 2017-09-14

## (undated) RX ORDER — LIDOCAINE HYDROCHLORIDE 10 MG/ML
INJECTION, SOLUTION EPIDURAL; INFILTRATION; INTRACAUDAL; PERINEURAL
Status: DISPENSED
Start: 2017-10-09

## (undated) RX ORDER — HEPARIN SODIUM (PORCINE) LOCK FLUSH IV SOLN 100 UNIT/ML 100 UNIT/ML
SOLUTION INTRAVENOUS
Status: DISPENSED
Start: 2018-05-21

## (undated) RX ORDER — FENTANYL CITRATE 50 UG/ML
INJECTION, SOLUTION INTRAMUSCULAR; INTRAVENOUS
Status: DISPENSED
Start: 2017-10-03

## (undated) RX ORDER — LIDOCAINE HYDROCHLORIDE 10 MG/ML
INJECTION, SOLUTION EPIDURAL; INFILTRATION; INTRACAUDAL; PERINEURAL
Status: DISPENSED
Start: 2017-09-15

## (undated) RX ORDER — ACETAMINOPHEN 325 MG/1
TABLET ORAL
Status: DISPENSED
Start: 2017-10-04

## (undated) RX ORDER — PROPOFOL 10 MG/ML
INJECTION, EMULSION INTRAVENOUS
Status: DISPENSED
Start: 2019-08-19

## (undated) RX ORDER — ACETAMINOPHEN 325 MG/1
TABLET ORAL
Status: DISPENSED
Start: 2019-08-19

## (undated) RX ORDER — LIDOCAINE HYDROCHLORIDE 20 MG/ML
INJECTION, SOLUTION EPIDURAL; INFILTRATION; INTRACAUDAL; PERINEURAL
Status: DISPENSED
Start: 2017-09-14

## (undated) RX ORDER — OXYCODONE HYDROCHLORIDE 5 MG/1
TABLET ORAL
Status: DISPENSED
Start: 2019-08-19

## (undated) RX ORDER — LIDOCAINE HYDROCHLORIDE 10 MG/ML
INJECTION, SOLUTION EPIDURAL; INFILTRATION; INTRACAUDAL; PERINEURAL
Status: DISPENSED
Start: 2017-11-17

## (undated) RX ORDER — FENTANYL CITRATE 50 UG/ML
INJECTION, SOLUTION INTRAMUSCULAR; INTRAVENOUS
Status: DISPENSED
Start: 2019-11-11

## (undated) RX ORDER — FENTANYL CITRATE 50 UG/ML
INJECTION, SOLUTION INTRAMUSCULAR; INTRAVENOUS
Status: DISPENSED
Start: 2017-09-15

## (undated) RX ORDER — HEPARIN SODIUM (PORCINE) LOCK FLUSH IV SOLN 100 UNIT/ML 100 UNIT/ML
SOLUTION INTRAVENOUS
Status: DISPENSED
Start: 2019-10-11

## (undated) RX ORDER — ONDANSETRON 2 MG/ML
INJECTION INTRAMUSCULAR; INTRAVENOUS
Status: DISPENSED
Start: 2017-09-15

## (undated) RX ORDER — GABAPENTIN 300 MG/1
CAPSULE ORAL
Status: DISPENSED
Start: 2019-11-11

## (undated) RX ORDER — HEPARIN SODIUM (PORCINE) LOCK FLUSH IV SOLN 100 UNIT/ML 100 UNIT/ML
SOLUTION INTRAVENOUS
Status: DISPENSED
Start: 2019-11-01

## (undated) RX ORDER — ACETAMINOPHEN 325 MG/1
TABLET ORAL
Status: DISPENSED
Start: 2017-10-03

## (undated) RX ORDER — DEXAMETHASONE SODIUM PHOSPHATE 4 MG/ML
INJECTION, SOLUTION INTRA-ARTICULAR; INTRALESIONAL; INTRAMUSCULAR; INTRAVENOUS; SOFT TISSUE
Status: DISPENSED
Start: 2017-09-14

## (undated) RX ORDER — PROPOFOL 10 MG/ML
INJECTION, EMULSION INTRAVENOUS
Status: DISPENSED
Start: 2017-09-14